# Patient Record
Sex: FEMALE | Race: WHITE | NOT HISPANIC OR LATINO | Employment: UNEMPLOYED | URBAN - METROPOLITAN AREA
[De-identification: names, ages, dates, MRNs, and addresses within clinical notes are randomized per-mention and may not be internally consistent; named-entity substitution may affect disease eponyms.]

---

## 2017-03-10 ENCOUNTER — TRANSCRIBE ORDERS (OUTPATIENT)
Dept: ADMINISTRATIVE | Facility: HOSPITAL | Age: 42
End: 2017-03-10

## 2017-03-10 ENCOUNTER — ALLSCRIPTS OFFICE VISIT (OUTPATIENT)
Dept: OTHER | Facility: OTHER | Age: 42
End: 2017-03-10

## 2017-03-10 DIAGNOSIS — G93.2 BENIGN INTRACRANIAL HYPERTENSION: ICD-10-CM

## 2017-03-10 DIAGNOSIS — R51.9 FACIAL PAIN: Primary | ICD-10-CM

## 2017-03-10 DIAGNOSIS — R51.9 HEADACHE: ICD-10-CM

## 2017-03-11 ENCOUNTER — HOSPITAL ENCOUNTER (EMERGENCY)
Facility: HOSPITAL | Age: 42
Discharge: HOME | End: 2017-03-11
Attending: EMERGENCY MEDICINE | Admitting: EMERGENCY MEDICINE
Payer: COMMERCIAL

## 2017-03-11 ENCOUNTER — APPOINTMENT (EMERGENCY)
Dept: RADIOLOGY | Facility: HOSPITAL | Age: 42
End: 2017-03-11
Payer: COMMERCIAL

## 2017-03-11 ENCOUNTER — APPOINTMENT (EMERGENCY)
Dept: CT IMAGING | Facility: HOSPITAL | Age: 42
End: 2017-03-11
Payer: COMMERCIAL

## 2017-03-11 VITALS
DIASTOLIC BLOOD PRESSURE: 68 MMHG | HEART RATE: 66 BPM | OXYGEN SATURATION: 100 % | TEMPERATURE: 98.4 F | WEIGHT: 175 LBS | SYSTOLIC BLOOD PRESSURE: 107 MMHG | RESPIRATION RATE: 18 BRPM

## 2017-03-11 DIAGNOSIS — T85.618A SHUNT MALFUNCTION, INITIAL ENCOUNTER: ICD-10-CM

## 2017-03-11 DIAGNOSIS — R11.0 NAUSEA: ICD-10-CM

## 2017-03-11 DIAGNOSIS — R51.9 HEADACHE: Primary | ICD-10-CM

## 2017-03-11 LAB
ANION GAP SERPL CALCULATED.3IONS-SCNC: 11 MMOL/L (ref 4–13)
APTT PPP: 22 SECONDS (ref 24–36)
BACTERIA UR QL AUTO: ABNORMAL /HPF
BASOPHILS # BLD AUTO: 0.03 THOUSANDS/ΜL (ref 0–0.1)
BASOPHILS NFR BLD AUTO: 0 % (ref 0–1)
BILIRUB UR QL STRIP: ABNORMAL
BUN SERPL-MCNC: 16 MG/DL (ref 5–25)
CALCIUM SERPL-MCNC: 9.3 MG/DL (ref 8.3–10.1)
CAOX CRY URNS QL MICRO: ABNORMAL /HPF
CHLORIDE SERPL-SCNC: 103 MMOL/L (ref 100–108)
CLARITY UR: CLEAR
CO2 SERPL-SCNC: 27 MMOL/L (ref 21–32)
COLOR UR: YELLOW
CREAT SERPL-MCNC: 0.52 MG/DL (ref 0.6–1.3)
EOSINOPHIL # BLD AUTO: 0.21 THOUSAND/ΜL (ref 0–0.61)
EOSINOPHIL NFR BLD AUTO: 3 % (ref 0–6)
ERYTHROCYTE [DISTWIDTH] IN BLOOD BY AUTOMATED COUNT: 14 % (ref 11.6–15.1)
GFR SERPL CREATININE-BSD FRML MDRD: >60 ML/MIN/1.73SQ M
GLUCOSE SERPL-MCNC: 87 MG/DL (ref 65–140)
GLUCOSE UR STRIP-MCNC: NEGATIVE MG/DL
HCG UR QL: NEGATIVE
HCT VFR BLD AUTO: 43.5 % (ref 34.8–46.1)
HGB BLD-MCNC: 15.4 G/DL (ref 11.5–15.4)
HGB UR QL STRIP.AUTO: ABNORMAL
INR PPP: 1.13 (ref 0.86–1.16)
KETONES UR STRIP-MCNC: ABNORMAL MG/DL
LEUKOCYTE ESTERASE UR QL STRIP: NEGATIVE
LYMPHOCYTES # BLD AUTO: 1.15 THOUSANDS/ΜL (ref 0.6–4.47)
LYMPHOCYTES NFR BLD AUTO: 17 % (ref 14–44)
MCH RBC QN AUTO: 31 PG (ref 26.8–34.3)
MCHC RBC AUTO-ENTMCNC: 35.4 G/DL (ref 31.4–37.4)
MCV RBC AUTO: 88 FL (ref 82–98)
MONOCYTES # BLD AUTO: 0.4 THOUSAND/ΜL (ref 0.17–1.22)
MONOCYTES NFR BLD AUTO: 6 % (ref 4–12)
MUCOUS THREADS UR QL AUTO: ABNORMAL
NEUTROPHILS # BLD AUTO: 4.97 THOUSANDS/ΜL (ref 1.85–7.62)
NEUTS SEG NFR BLD AUTO: 74 % (ref 43–75)
NITRITE UR QL STRIP: NEGATIVE
NON-SQ EPI CELLS URNS QL MICRO: ABNORMAL /HPF
NRBC BLD AUTO-RTO: 0 /100 WBCS
PH UR STRIP.AUTO: 6 [PH] (ref 4.5–8)
PLATELET # BLD AUTO: 206 THOUSANDS/UL (ref 149–390)
PMV BLD AUTO: 11.8 FL (ref 8.9–12.7)
POTASSIUM SERPL-SCNC: 5.4 MMOL/L (ref 3.5–5.3)
PROT UR STRIP-MCNC: ABNORMAL MG/DL
PROTHROMBIN TIME: 14.5 SECONDS (ref 12–14.3)
RBC # BLD AUTO: 4.96 MILLION/UL (ref 3.81–5.12)
RBC #/AREA URNS AUTO: ABNORMAL /HPF
SODIUM SERPL-SCNC: 141 MMOL/L (ref 136–145)
SP GR UR STRIP.AUTO: >=1.03 (ref 1–1.03)
UROBILINOGEN UR QL STRIP.AUTO: 2 E.U./DL
WBC # BLD AUTO: 6.76 THOUSAND/UL (ref 4.31–10.16)
WBC #/AREA URNS AUTO: ABNORMAL /HPF

## 2017-03-11 PROCEDURE — 99284 EMERGENCY DEPT VISIT MOD MDM: CPT

## 2017-03-11 PROCEDURE — 81001 URINALYSIS AUTO W/SCOPE: CPT

## 2017-03-11 PROCEDURE — 85025 COMPLETE CBC W/AUTO DIFF WBC: CPT | Performed by: EMERGENCY MEDICINE

## 2017-03-11 PROCEDURE — 81002 URINALYSIS NONAUTO W/O SCOPE: CPT | Performed by: EMERGENCY MEDICINE

## 2017-03-11 PROCEDURE — 70450 CT HEAD/BRAIN W/O DYE: CPT

## 2017-03-11 PROCEDURE — 87086 URINE CULTURE/COLONY COUNT: CPT

## 2017-03-11 PROCEDURE — 81025 URINE PREGNANCY TEST: CPT | Performed by: EMERGENCY MEDICINE

## 2017-03-11 PROCEDURE — 72100 X-RAY EXAM L-S SPINE 2/3 VWS: CPT

## 2017-03-11 PROCEDURE — 96372 THER/PROPH/DIAG INJ SC/IM: CPT

## 2017-03-11 PROCEDURE — 36415 COLL VENOUS BLD VENIPUNCTURE: CPT | Performed by: EMERGENCY MEDICINE

## 2017-03-11 PROCEDURE — 85610 PROTHROMBIN TIME: CPT | Performed by: EMERGENCY MEDICINE

## 2017-03-11 PROCEDURE — 85730 THROMBOPLASTIN TIME PARTIAL: CPT | Performed by: EMERGENCY MEDICINE

## 2017-03-11 PROCEDURE — 80048 BASIC METABOLIC PNL TOTAL CA: CPT | Performed by: EMERGENCY MEDICINE

## 2017-03-11 RX ORDER — KETOROLAC TROMETHAMINE 30 MG/ML
15 INJECTION, SOLUTION INTRAMUSCULAR; INTRAVENOUS ONCE
Status: DISCONTINUED | OUTPATIENT
Start: 2017-03-11 | End: 2017-03-11

## 2017-03-11 RX ORDER — METOCLOPRAMIDE 10 MG/1
10 TABLET ORAL EVERY 6 HOURS
Qty: 20 TABLET | Refills: 0 | Status: SHIPPED | OUTPATIENT
Start: 2017-03-11 | End: 2017-03-16

## 2017-03-11 RX ORDER — METOCLOPRAMIDE HYDROCHLORIDE 5 MG/ML
10 INJECTION INTRAMUSCULAR; INTRAVENOUS ONCE
Status: DISCONTINUED | OUTPATIENT
Start: 2017-03-11 | End: 2017-03-11

## 2017-03-11 RX ORDER — DIPHENHYDRAMINE HYDROCHLORIDE 50 MG/ML
50 INJECTION INTRAMUSCULAR; INTRAVENOUS ONCE
Status: DISCONTINUED | OUTPATIENT
Start: 2017-03-11 | End: 2017-03-11

## 2017-03-11 RX ORDER — KETOROLAC TROMETHAMINE 30 MG/ML
15 INJECTION, SOLUTION INTRAMUSCULAR; INTRAVENOUS ONCE
Status: COMPLETED | OUTPATIENT
Start: 2017-03-11 | End: 2017-03-11

## 2017-03-11 RX ORDER — MORPHINE SULFATE 15 MG/1
7.5 TABLET ORAL EVERY 6 HOURS PRN
Qty: 5 TABLET | Refills: 0 | Status: SHIPPED | OUTPATIENT
Start: 2017-03-11 | End: 2017-03-13

## 2017-03-11 RX ORDER — OLANZAPINE 5 MG/1
10 TABLET, ORALLY DISINTEGRATING ORAL ONCE
Status: COMPLETED | OUTPATIENT
Start: 2017-03-11 | End: 2017-03-11

## 2017-03-11 RX ORDER — METOCLOPRAMIDE 10 MG/1
10 TABLET ORAL ONCE
Status: COMPLETED | OUTPATIENT
Start: 2017-03-11 | End: 2017-03-11

## 2017-03-11 RX ADMIN — KETOROLAC TROMETHAMINE 15 MG: 30 INJECTION, SOLUTION INTRAMUSCULAR at 17:07

## 2017-03-11 RX ADMIN — OLANZAPINE 10 MG: 5 TABLET, ORALLY DISINTEGRATING ORAL at 15:34

## 2017-03-11 RX ADMIN — METOCLOPRAMIDE 10 MG: 10 TABLET ORAL at 17:07

## 2017-03-13 LAB — BACTERIA UR CULT: NORMAL

## 2017-03-16 ENCOUNTER — GENERIC CONVERSION - ENCOUNTER (OUTPATIENT)
Dept: OTHER | Facility: OTHER | Age: 42
End: 2017-03-16

## 2017-03-22 ENCOUNTER — TRANSCRIBE ORDERS (OUTPATIENT)
Dept: ADMINISTRATIVE | Facility: HOSPITAL | Age: 42
End: 2017-03-22

## 2017-03-22 DIAGNOSIS — G93.2 BENIGN INTRACRANIAL HYPERTENSION: Primary | ICD-10-CM

## 2017-03-27 ENCOUNTER — HOSPITAL ENCOUNTER (OUTPATIENT)
Dept: MRI IMAGING | Facility: HOSPITAL | Age: 42
Discharge: HOME | End: 2017-03-27
Attending: PSYCHIATRY & NEUROLOGY
Payer: COMMERCIAL

## 2017-03-27 ENCOUNTER — GENERIC CONVERSION - ENCOUNTER (OUTPATIENT)
Dept: OTHER | Facility: OTHER | Age: 42
End: 2017-03-27

## 2017-03-27 DIAGNOSIS — R51.9 HEADACHE: ICD-10-CM

## 2017-03-27 DIAGNOSIS — G93.2 BENIGN INTRACRANIAL HYPERTENSION: ICD-10-CM

## 2017-03-27 PROCEDURE — 70544 MR ANGIOGRAPHY HEAD W/O DYE: CPT

## 2017-03-27 PROCEDURE — 70551 MRI BRAIN STEM W/O DYE: CPT

## 2017-03-29 ENCOUNTER — HOSPITAL ENCOUNTER (OUTPATIENT)
Dept: MRI IMAGING | Facility: HOSPITAL | Age: 42
Discharge: HOME | End: 2017-03-29
Attending: PSYCHIATRY & NEUROLOGY
Payer: COMMERCIAL

## 2017-03-29 DIAGNOSIS — R51.9 FACIAL PAIN: ICD-10-CM

## 2017-03-29 DIAGNOSIS — G93.2 BENIGN INTRACRANIAL HYPERTENSION: ICD-10-CM

## 2017-03-29 PROCEDURE — 70547 MR ANGIOGRAPHY NECK W/O DYE: CPT

## 2017-03-30 ENCOUNTER — ALLSCRIPTS OFFICE VISIT (OUTPATIENT)
Dept: OTHER | Facility: OTHER | Age: 42
End: 2017-03-30

## 2017-04-04 ENCOUNTER — GENERIC CONVERSION - ENCOUNTER (OUTPATIENT)
Dept: OTHER | Facility: OTHER | Age: 42
End: 2017-04-04

## 2017-04-05 ENCOUNTER — HOSPITAL ENCOUNTER (OUTPATIENT)
Dept: RADIOLOGY | Facility: HOSPITAL | Age: 42
Discharge: HOME | End: 2017-04-05
Attending: PSYCHIATRY & NEUROLOGY
Payer: COMMERCIAL

## 2017-04-10 ENCOUNTER — GENERIC CONVERSION - ENCOUNTER (OUTPATIENT)
Dept: OTHER | Facility: OTHER | Age: 42
End: 2017-04-10

## 2017-04-21 ENCOUNTER — ALLSCRIPTS OFFICE VISIT (OUTPATIENT)
Dept: OTHER | Facility: OTHER | Age: 42
End: 2017-04-21

## 2017-04-27 ENCOUNTER — ALLSCRIPTS OFFICE VISIT (OUTPATIENT)
Dept: OTHER | Facility: OTHER | Age: 42
End: 2017-04-27

## 2017-04-27 DIAGNOSIS — M75.51 BURSITIS OF RIGHT SHOULDER: ICD-10-CM

## 2017-05-02 ENCOUNTER — GENERIC CONVERSION - ENCOUNTER (OUTPATIENT)
Dept: OTHER | Facility: OTHER | Age: 42
End: 2017-05-02

## 2017-05-08 RX ORDER — MULTIVIT-MIN/IRON/FOLIC ACID/K 18-600-40
CAPSULE ORAL DAILY
COMMUNITY
End: 2018-04-13 | Stop reason: HOSPADM

## 2017-05-08 RX ORDER — FUROSEMIDE 20 MG/1
20 TABLET ORAL DAILY
COMMUNITY
End: 2017-06-02 | Stop reason: HOSPADM

## 2017-05-08 RX ORDER — LANOLIN ALCOHOL/MO/W.PET/CERES
1000 CREAM (GRAM) TOPICAL DAILY
COMMUNITY
End: 2018-04-13 | Stop reason: HOSPADM

## 2017-05-08 RX ORDER — MULTIVITAMIN
1 TABLET ORAL DAILY
COMMUNITY
End: 2018-04-13 | Stop reason: HOSPADM

## 2017-05-08 RX ORDER — BIOTIN 10000 MCG
CAPSULE ORAL DAILY
COMMUNITY
End: 2018-04-13 | Stop reason: HOSPADM

## 2017-05-08 RX ORDER — PANTOPRAZOLE SODIUM 40 MG/1
40 TABLET, DELAYED RELEASE ORAL 2 TIMES DAILY
COMMUNITY
End: 2018-04-13 | Stop reason: HOSPADM

## 2017-05-08 RX ORDER — INDOMETHACIN 50 MG/1
50 CAPSULE ORAL 2 TIMES DAILY PRN
COMMUNITY
End: 2018-04-13 | Stop reason: HOSPADM

## 2017-05-08 RX ORDER — VERAPAMIL HYDROCHLORIDE 100 MG/1
100 CAPSULE, EXTENDED RELEASE ORAL DAILY
COMMUNITY
End: 2017-06-02 | Stop reason: HOSPADM

## 2017-05-17 ENCOUNTER — GENERIC CONVERSION - ENCOUNTER (OUTPATIENT)
Dept: OTHER | Facility: OTHER | Age: 42
End: 2017-05-17

## 2017-05-24 ENCOUNTER — ALLSCRIPTS OFFICE VISIT (OUTPATIENT)
Dept: OTHER | Facility: OTHER | Age: 42
End: 2017-05-24

## 2017-05-24 ENCOUNTER — GENERIC CONVERSION - ENCOUNTER (OUTPATIENT)
Dept: OTHER | Facility: OTHER | Age: 42
End: 2017-05-24

## 2017-05-25 ENCOUNTER — ANESTHESIA EVENT (OUTPATIENT)
Dept: PERIOP | Facility: HOSPITAL | Age: 42
DRG: 022 | End: 2017-05-25
Payer: COMMERCIAL

## 2017-05-30 ENCOUNTER — GENERIC CONVERSION - ENCOUNTER (OUTPATIENT)
Dept: OTHER | Facility: OTHER | Age: 42
End: 2017-05-30

## 2017-05-31 ENCOUNTER — APPOINTMENT (INPATIENT)
Dept: RADIOLOGY | Facility: HOSPITAL | Age: 42
DRG: 022 | End: 2017-05-31
Payer: COMMERCIAL

## 2017-05-31 ENCOUNTER — HOSPITAL ENCOUNTER (INPATIENT)
Facility: HOSPITAL | Age: 42
LOS: 2 days | Discharge: HOME | DRG: 022 | End: 2017-06-02
Attending: NEUROLOGICAL SURGERY | Admitting: NEUROLOGICAL SURGERY
Payer: COMMERCIAL

## 2017-05-31 ENCOUNTER — ANESTHESIA (OUTPATIENT)
Dept: PERIOP | Facility: HOSPITAL | Age: 42
DRG: 022 | End: 2017-05-31
Payer: COMMERCIAL

## 2017-05-31 DIAGNOSIS — G93.2 PSEUDOTUMOR CEREBRI: Primary | ICD-10-CM

## 2017-05-31 PROBLEM — Z98.2 S/P VP SHUNT: Status: ACTIVE | Noted: 2017-05-31

## 2017-05-31 PROBLEM — R51.9 OCCIPITAL HEADACHE: Status: ACTIVE | Noted: 2017-05-31

## 2017-05-31 LAB
ABO GROUP BLD: NORMAL
ALBUMIN SERPL BCP-MCNC: 3.3 G/DL (ref 3.5–5)
ALP SERPL-CCNC: 62 U/L (ref 46–116)
ALT SERPL W P-5'-P-CCNC: 30 U/L (ref 12–78)
ANION GAP SERPL CALCULATED.3IONS-SCNC: 10 MMOL/L (ref 4–13)
APTT PPP: 28 SECONDS (ref 23–35)
AST SERPL W P-5'-P-CCNC: 18 U/L (ref 5–45)
BASOPHILS # BLD MANUAL: 0 THOUSAND/UL (ref 0–0.1)
BASOPHILS NFR MAR MANUAL: 0 % (ref 0–1)
BILIRUB SERPL-MCNC: 0.45 MG/DL (ref 0.2–1)
BLD GP AB SCN SERPL QL: NEGATIVE
BUN SERPL-MCNC: 14 MG/DL (ref 5–25)
BURR CELLS BLD QL SMEAR: PRESENT
CA-I BLD-SCNC: 1.14 MMOL/L (ref 1.12–1.32)
CALCIUM SERPL-MCNC: 8.6 MG/DL (ref 8.3–10.1)
CHLORIDE SERPL-SCNC: 107 MMOL/L (ref 100–108)
CO2 SERPL-SCNC: 23 MMOL/L (ref 21–32)
CREAT SERPL-MCNC: 0.52 MG/DL (ref 0.6–1.3)
EOSINOPHIL # BLD MANUAL: 0 THOUSAND/UL (ref 0–0.4)
EOSINOPHIL NFR BLD MANUAL: 0 % (ref 0–6)
ERYTHROCYTE [DISTWIDTH] IN BLOOD BY AUTOMATED COUNT: 14.3 % (ref 11.6–15.1)
GFR SERPL CREATININE-BSD FRML MDRD: >60 ML/MIN/1.73SQ M
GLUCOSE SERPL-MCNC: 131 MG/DL (ref 65–140)
GLUCOSE SERPL-MCNC: 139 MG/DL (ref 65–140)
HCT VFR BLD AUTO: 40.7 % (ref 34.8–46.1)
HGB BLD-MCNC: 14 G/DL (ref 11.5–15.4)
INR PPP: 1.17 (ref 0.86–1.16)
LYMPHOCYTES # BLD AUTO: 0.3 THOUSAND/UL (ref 0.6–4.47)
LYMPHOCYTES # BLD AUTO: 3 % (ref 14–44)
MAGNESIUM SERPL-MCNC: 2 MG/DL (ref 1.6–2.6)
MCH RBC QN AUTO: 30.4 PG (ref 26.8–34.3)
MCHC RBC AUTO-ENTMCNC: 34.4 G/DL (ref 31.4–37.4)
MCV RBC AUTO: 88 FL (ref 82–98)
MONOCYTES # BLD AUTO: 0.1 THOUSAND/UL (ref 0–1.22)
MONOCYTES NFR BLD: 1 % (ref 4–12)
NEUTROPHILS # BLD MANUAL: 9.68 THOUSAND/UL (ref 1.85–7.62)
NEUTS SEG NFR BLD AUTO: 96 % (ref 43–75)
NRBC BLD AUTO-RTO: 0 /100 WBCS
PHOSPHATE SERPL-MCNC: 3 MG/DL (ref 2.7–4.5)
PLATELET # BLD AUTO: 183 THOUSANDS/UL (ref 149–390)
PLATELET BLD QL SMEAR: ADEQUATE
PMV BLD AUTO: 10.9 FL (ref 8.9–12.7)
POTASSIUM SERPL-SCNC: 4.6 MMOL/L (ref 3.5–5.3)
PROT SERPL-MCNC: 6.2 G/DL (ref 6.4–8.2)
PROTHROMBIN TIME: 15 SECONDS (ref 12.1–14.4)
RBC # BLD AUTO: 4.61 MILLION/UL (ref 3.81–5.12)
RBC MORPH BLD: PRESENT
RH BLD: NEGATIVE
SODIUM SERPL-SCNC: 140 MMOL/L (ref 136–145)
SPECIMEN EXPIRATION DATE: NORMAL
WBC # BLD AUTO: 10.08 THOUSAND/UL (ref 4.31–10.16)

## 2017-05-31 PROCEDURE — 86850 RBC ANTIBODY SCREEN: CPT | Performed by: NEUROLOGICAL SURGERY

## 2017-05-31 PROCEDURE — 30233N1 TRANSFUSION OF NONAUTOLOGOUS RED BLOOD CELLS INTO PERIPHERAL VEIN, PERCUTANEOUS APPROACH: ICD-10-PCS | Performed by: NEUROLOGICAL SURGERY

## 2017-05-31 PROCEDURE — 86900 BLOOD TYPING SEROLOGIC ABO: CPT | Performed by: NEUROLOGICAL SURGERY

## 2017-05-31 PROCEDURE — 80053 COMPREHEN METABOLIC PANEL: CPT | Performed by: INTERNAL MEDICINE

## 2017-05-31 PROCEDURE — 86920 COMPATIBILITY TEST SPIN: CPT

## 2017-05-31 PROCEDURE — 71020 HB CHEST X-RAY 2VW FRONTAL&LATL: CPT

## 2017-05-31 PROCEDURE — 70250 X-RAY EXAM OF SKULL: CPT

## 2017-05-31 PROCEDURE — C1894 INTRO/SHEATH, NON-LASER: HCPCS | Performed by: NEUROLOGICAL SURGERY

## 2017-05-31 PROCEDURE — 82948 REAGENT STRIP/BLOOD GLUCOSE: CPT

## 2017-05-31 PROCEDURE — 82330 ASSAY OF CALCIUM: CPT | Performed by: INTERNAL MEDICINE

## 2017-05-31 PROCEDURE — 74000 HB X-RAY EXAM OF ABDOMEN (SINGLE ANTEROPOSTERIOR VIEW): CPT

## 2017-05-31 PROCEDURE — 85027 COMPLETE CBC AUTOMATED: CPT | Performed by: INTERNAL MEDICINE

## 2017-05-31 PROCEDURE — 00PU3JZ REMOVAL OF SYNTHETIC SUBSTITUTE FROM SPINAL CANAL, PERCUTANEOUS APPROACH: ICD-10-PCS | Performed by: NEUROLOGICAL SURGERY

## 2017-05-31 PROCEDURE — 83735 ASSAY OF MAGNESIUM: CPT | Performed by: INTERNAL MEDICINE

## 2017-05-31 PROCEDURE — 85007 BL SMEAR W/DIFF WBC COUNT: CPT | Performed by: INTERNAL MEDICINE

## 2017-05-31 PROCEDURE — 86901 BLOOD TYPING SEROLOGIC RH(D): CPT | Performed by: NEUROLOGICAL SURGERY

## 2017-05-31 PROCEDURE — 85610 PROTHROMBIN TIME: CPT | Performed by: INTERNAL MEDICINE

## 2017-05-31 PROCEDURE — 84100 ASSAY OF PHOSPHORUS: CPT | Performed by: INTERNAL MEDICINE

## 2017-05-31 PROCEDURE — 85730 THROMBOPLASTIN TIME PARTIAL: CPT | Performed by: INTERNAL MEDICINE

## 2017-05-31 PROCEDURE — 00163J6 BYPASS CEREBRAL VENTRICLE TO PERITONEAL CAVITY WITH SYNTHETIC SUBSTITUTE, PERCUTANEOUS APPROACH: ICD-10-PCS | Performed by: NEUROLOGICAL SURGERY

## 2017-05-31 PROCEDURE — C1729 CATH, DRAINAGE: HCPCS | Performed by: NEUROLOGICAL SURGERY

## 2017-05-31 DEVICE — IMPLANTABLE DEVICE
Type: IMPLANTABLE DEVICE | Site: BRAIN | Status: NON-FUNCTIONAL
Removed: 2018-02-05

## 2017-05-31 DEVICE — BACTISEAL VP CATHETER KIT
Type: IMPLANTABLE DEVICE | Site: PERITONEUM | Status: NON-FUNCTIONAL
Removed: 2018-02-05

## 2017-05-31 RX ORDER — BIOTIN 10000 MCG
1 CAPSULE ORAL DAILY
Status: DISCONTINUED | OUTPATIENT
Start: 2017-05-31 | End: 2017-05-31 | Stop reason: RX

## 2017-05-31 RX ORDER — DOCUSATE SODIUM 100 MG/1
100 CAPSULE, LIQUID FILLED ORAL 2 TIMES DAILY
Status: DISCONTINUED | OUTPATIENT
Start: 2017-05-31 | End: 2017-06-02 | Stop reason: HOSPADM

## 2017-05-31 RX ORDER — SODIUM CHLORIDE 9 MG/ML
75 INJECTION, SOLUTION INTRAVENOUS CONTINUOUS
Status: DISCONTINUED | OUTPATIENT
Start: 2017-05-31 | End: 2017-05-31

## 2017-05-31 RX ORDER — PROPOFOL 10 MG/ML
INJECTION, EMULSION INTRAVENOUS AS NEEDED
Status: DISCONTINUED | OUTPATIENT
Start: 2017-05-31 | End: 2017-05-31 | Stop reason: SURG

## 2017-05-31 RX ORDER — METOCLOPRAMIDE HYDROCHLORIDE 5 MG/ML
10 INJECTION INTRAMUSCULAR; INTRAVENOUS ONCE
Status: COMPLETED | OUTPATIENT
Start: 2017-05-31 | End: 2017-05-31

## 2017-05-31 RX ORDER — LIDOCAINE HYDROCHLORIDE AND EPINEPHRINE 10; 10 MG/ML; UG/ML
INJECTION, SOLUTION INFILTRATION; PERINEURAL AS NEEDED
Status: DISCONTINUED | OUTPATIENT
Start: 2017-05-31 | End: 2017-05-31 | Stop reason: HOSPADM

## 2017-05-31 RX ORDER — NALOXONE HYDROCHLORIDE 0.4 MG/ML
INJECTION, SOLUTION INTRAMUSCULAR; INTRAVENOUS; SUBCUTANEOUS AS NEEDED
Status: DISCONTINUED | OUTPATIENT
Start: 2017-05-31 | End: 2017-05-31 | Stop reason: SURG

## 2017-05-31 RX ORDER — LIDOCAINE HYDROCHLORIDE 10 MG/ML
INJECTION, SOLUTION INFILTRATION; PERINEURAL AS NEEDED
Status: DISCONTINUED | OUTPATIENT
Start: 2017-05-31 | End: 2017-05-31 | Stop reason: HOSPADM

## 2017-05-31 RX ORDER — OXYCODONE HYDROCHLORIDE 10 MG/1
10 TABLET ORAL EVERY 4 HOURS PRN
Status: DISCONTINUED | OUTPATIENT
Start: 2017-05-31 | End: 2017-06-01

## 2017-05-31 RX ORDER — ONDANSETRON 2 MG/ML
4 INJECTION INTRAMUSCULAR; INTRAVENOUS ONCE
Status: DISCONTINUED | OUTPATIENT
Start: 2017-05-31 | End: 2017-05-31 | Stop reason: HOSPADM

## 2017-05-31 RX ORDER — ACETAMINOPHEN 325 MG/1
650 TABLET ORAL EVERY 4 HOURS PRN
Status: DISCONTINUED | OUTPATIENT
Start: 2017-05-31 | End: 2017-06-02 | Stop reason: HOSPADM

## 2017-05-31 RX ORDER — PANTOPRAZOLE SODIUM 40 MG/1
40 TABLET, DELAYED RELEASE ORAL
Status: DISCONTINUED | OUTPATIENT
Start: 2017-05-31 | End: 2017-06-02 | Stop reason: HOSPADM

## 2017-05-31 RX ORDER — CHLORHEXIDINE GLUCONATE 0.12 MG/ML
15 RINSE ORAL ONCE
Status: COMPLETED | OUTPATIENT
Start: 2017-05-31 | End: 2017-05-31

## 2017-05-31 RX ORDER — BISACODYL 10 MG
10 SUPPOSITORY, RECTAL RECTAL DAILY PRN
Status: DISCONTINUED | OUTPATIENT
Start: 2017-05-31 | End: 2017-06-02 | Stop reason: HOSPADM

## 2017-05-31 RX ORDER — SODIUM CHLORIDE 9 MG/ML
INJECTION, SOLUTION INTRAVENOUS CONTINUOUS PRN
Status: DISCONTINUED | OUTPATIENT
Start: 2017-05-31 | End: 2017-05-31 | Stop reason: SURG

## 2017-05-31 RX ORDER — ROCURONIUM BROMIDE 10 MG/ML
INJECTION, SOLUTION INTRAVENOUS AS NEEDED
Status: DISCONTINUED | OUTPATIENT
Start: 2017-05-31 | End: 2017-05-31 | Stop reason: SURG

## 2017-05-31 RX ORDER — HEPARIN SODIUM 5000 [USP'U]/ML
5000 INJECTION, SOLUTION INTRAVENOUS; SUBCUTANEOUS EVERY 8 HOURS SCHEDULED
Status: DISCONTINUED | OUTPATIENT
Start: 2017-06-01 | End: 2017-06-02 | Stop reason: HOSPADM

## 2017-05-31 RX ORDER — ONDANSETRON 2 MG/ML
4 INJECTION INTRAMUSCULAR; INTRAVENOUS EVERY 4 HOURS PRN
Status: DISCONTINUED | OUTPATIENT
Start: 2017-05-31 | End: 2017-06-02

## 2017-05-31 RX ORDER — GLYCOPYRROLATE 0.2 MG/ML
INJECTION INTRAMUSCULAR; INTRAVENOUS AS NEEDED
Status: DISCONTINUED | OUTPATIENT
Start: 2017-05-31 | End: 2017-05-31 | Stop reason: SURG

## 2017-05-31 RX ORDER — SUCCINYLCHOLINE CHLORIDE 20 MG/ML
INJECTION INTRAMUSCULAR; INTRAVENOUS AS NEEDED
Status: DISCONTINUED | OUTPATIENT
Start: 2017-05-31 | End: 2017-05-31 | Stop reason: SURG

## 2017-05-31 RX ORDER — KETAMINE HYDROCHLORIDE 50 MG/ML
INJECTION, SOLUTION, CONCENTRATE INTRAMUSCULAR; INTRAVENOUS AS NEEDED
Status: DISCONTINUED | OUTPATIENT
Start: 2017-05-31 | End: 2017-05-31 | Stop reason: SURG

## 2017-05-31 RX ORDER — MIDAZOLAM HYDROCHLORIDE 1 MG/ML
INJECTION INTRAMUSCULAR; INTRAVENOUS AS NEEDED
Status: DISCONTINUED | OUTPATIENT
Start: 2017-05-31 | End: 2017-05-31 | Stop reason: SURG

## 2017-05-31 RX ORDER — ONDANSETRON 2 MG/ML
INJECTION INTRAMUSCULAR; INTRAVENOUS AS NEEDED
Status: DISCONTINUED | OUTPATIENT
Start: 2017-05-31 | End: 2017-05-31 | Stop reason: SURG

## 2017-05-31 RX ORDER — SODIUM CHLORIDE, SODIUM LACTATE, POTASSIUM CHLORIDE, CALCIUM CHLORIDE 600; 310; 30; 20 MG/100ML; MG/100ML; MG/100ML; MG/100ML
INJECTION, SOLUTION INTRAVENOUS CONTINUOUS PRN
Status: DISCONTINUED | OUTPATIENT
Start: 2017-05-31 | End: 2017-05-31 | Stop reason: SURG

## 2017-05-31 RX ORDER — FENTANYL CITRATE 50 UG/ML
25 INJECTION, SOLUTION INTRAMUSCULAR; INTRAVENOUS
Status: DISCONTINUED | OUTPATIENT
Start: 2017-05-31 | End: 2017-06-01

## 2017-05-31 RX ORDER — PROPOFOL 10 MG/ML
INJECTION, EMULSION INTRAVENOUS CONTINUOUS PRN
Status: DISCONTINUED | OUTPATIENT
Start: 2017-05-31 | End: 2017-05-31 | Stop reason: SURG

## 2017-05-31 RX ORDER — CHOLECALCIFEROL (VITAMIN D3) 125 MCG
1000 CAPSULE ORAL DAILY
Status: DISCONTINUED | OUTPATIENT
Start: 2017-05-31 | End: 2017-06-02 | Stop reason: HOSPADM

## 2017-05-31 RX ADMIN — SODIUM CHLORIDE, SODIUM LACTATE, POTASSIUM CHLORIDE, AND CALCIUM CHLORIDE: .6; .31; .03; .02 INJECTION, SOLUTION INTRAVENOUS at 10:00

## 2017-05-31 RX ADMIN — CEFAZOLIN SODIUM 2000 MG: 2 SOLUTION INTRAVENOUS at 08:50

## 2017-05-31 RX ADMIN — MIDAZOLAM HYDROCHLORIDE 2 MG: 1 INJECTION, SOLUTION INTRAMUSCULAR; INTRAVENOUS at 08:27

## 2017-05-31 RX ADMIN — HYDROMORPHONE HYDROCHLORIDE 0.25 MG: 1 INJECTION, SOLUTION INTRAMUSCULAR; INTRAVENOUS; SUBCUTANEOUS at 11:10

## 2017-05-31 RX ADMIN — DEXAMETHASONE SODIUM PHOSPHATE 10 MG: 10 INJECTION INTRAMUSCULAR; INTRAVENOUS at 09:11

## 2017-05-31 RX ADMIN — HYDROMORPHONE HYDROCHLORIDE 0.4 MG: 1 INJECTION, SOLUTION INTRAMUSCULAR; INTRAVENOUS; SUBCUTANEOUS at 12:26

## 2017-05-31 RX ADMIN — LEVETIRACETAM 500 MG: 100 INJECTION, SOLUTION, CONCENTRATE INTRAVENOUS at 21:31

## 2017-05-31 RX ADMIN — PROPOFOL 200 MG: 10 INJECTION, EMULSION INTRAVENOUS at 08:31

## 2017-05-31 RX ADMIN — GLYCOPYRROLATE 0.2 MG: 0.2 INJECTION INTRAMUSCULAR; INTRAVENOUS at 10:00

## 2017-05-31 RX ADMIN — CYANOCOBALAMIN TAB 500 MCG 1000 MCG: 500 TAB at 16:03

## 2017-05-31 RX ADMIN — HYDROMORPHONE HYDROCHLORIDE 1 MG: 1 INJECTION, SOLUTION INTRAMUSCULAR; INTRAVENOUS; SUBCUTANEOUS at 15:53

## 2017-05-31 RX ADMIN — ONDANSETRON 4 MG: 2 INJECTION INTRAMUSCULAR; INTRAVENOUS at 17:12

## 2017-05-31 RX ADMIN — HYDROMORPHONE HYDROCHLORIDE 1 MG: 1 INJECTION, SOLUTION INTRAMUSCULAR; INTRAVENOUS; SUBCUTANEOUS at 08:40

## 2017-05-31 RX ADMIN — GLYCOPYRROLATE 0.2 MG: 0.2 INJECTION INTRAMUSCULAR; INTRAVENOUS at 11:50

## 2017-05-31 RX ADMIN — OXYCODONE HYDROCHLORIDE 10 MG: 10 TABLET ORAL at 17:56

## 2017-05-31 RX ADMIN — SODIUM CHLORIDE 75 ML/HR: 0.9 INJECTION, SOLUTION INTRAVENOUS at 13:29

## 2017-05-31 RX ADMIN — LEVETIRACETAM 1000 MG: 100 INJECTION, SOLUTION INTRAVENOUS at 09:40

## 2017-05-31 RX ADMIN — ROCURONIUM BROMIDE 25 MG: 10 INJECTION, SOLUTION INTRAVENOUS at 08:50

## 2017-05-31 RX ADMIN — HYDROMORPHONE HYDROCHLORIDE 0.5 MG: 1 INJECTION, SOLUTION INTRAMUSCULAR; INTRAVENOUS; SUBCUTANEOUS at 10:55

## 2017-05-31 RX ADMIN — HYDROMORPHONE HYDROCHLORIDE 0.4 MG: 1 INJECTION, SOLUTION INTRAMUSCULAR; INTRAVENOUS; SUBCUTANEOUS at 12:40

## 2017-05-31 RX ADMIN — KETAMINE HYDROCHLORIDE 35 MG: 50 INJECTION, SOLUTION INTRAMUSCULAR; INTRAVENOUS at 09:11

## 2017-05-31 RX ADMIN — VERAPAMIL HYDROCHLORIDE 120 MG: 120 TABLET, FILM COATED, EXTENDED RELEASE ORAL at 16:03

## 2017-05-31 RX ADMIN — DOCUSATE SODIUM 100 MG: 100 CAPSULE, LIQUID FILLED ORAL at 17:56

## 2017-05-31 RX ADMIN — PROPOFOL 125 MCG/KG/MIN: 10 INJECTION, EMULSION INTRAVENOUS at 09:43

## 2017-05-31 RX ADMIN — PROPOFOL 50 MG: 10 INJECTION, EMULSION INTRAVENOUS at 10:55

## 2017-05-31 RX ADMIN — ONDANSETRON 4 MG: 2 INJECTION INTRAMUSCULAR; INTRAVENOUS at 21:31

## 2017-05-31 RX ADMIN — PANTOPRAZOLE SODIUM 40 MG: 40 TABLET, DELAYED RELEASE ORAL at 15:53

## 2017-05-31 RX ADMIN — CHLORHEXIDINE GLUCONATE 15 ML: 1.2 RINSE ORAL at 06:41

## 2017-05-31 RX ADMIN — NALOXONE HYDROCHLORIDE 0.04 MG: 0.4 INJECTION, SOLUTION INTRAMUSCULAR; INTRAVENOUS; SUBCUTANEOUS at 12:03

## 2017-05-31 RX ADMIN — HYDROMORPHONE HYDROCHLORIDE 0.4 MG: 1 INJECTION, SOLUTION INTRAMUSCULAR; INTRAVENOUS; SUBCUTANEOUS at 12:19

## 2017-05-31 RX ADMIN — SUCCINYLCHOLINE CHLORIDE 100 MG: 20 INJECTION, SOLUTION INTRAMUSCULAR; INTRAVENOUS at 08:31

## 2017-05-31 RX ADMIN — LIDOCAINE HYDROCHLORIDE 5 ML: 20 INJECTION, SOLUTION INTRAVENOUS at 08:32

## 2017-05-31 RX ADMIN — CEFAZOLIN SODIUM 1000 MG: 1 SOLUTION INTRAVENOUS at 15:53

## 2017-05-31 RX ADMIN — HYDROMORPHONE HYDROCHLORIDE 0.25 MG: 1 INJECTION, SOLUTION INTRAMUSCULAR; INTRAVENOUS; SUBCUTANEOUS at 10:40

## 2017-05-31 RX ADMIN — NALOXONE HYDROCHLORIDE 0.04 MG: 0.4 INJECTION, SOLUTION INTRAMUSCULAR; INTRAVENOUS; SUBCUTANEOUS at 12:04

## 2017-05-31 RX ADMIN — ONDANSETRON 4 MG: 2 INJECTION INTRAMUSCULAR; INTRAVENOUS at 11:34

## 2017-05-31 RX ADMIN — METOCLOPRAMIDE 10 MG: 5 INJECTION, SOLUTION INTRAMUSCULAR; INTRAVENOUS at 22:00

## 2017-05-31 RX ADMIN — SODIUM CHLORIDE, SODIUM LACTATE, POTASSIUM CHLORIDE, AND CALCIUM CHLORIDE: .6; .31; .03; .02 INJECTION, SOLUTION INTRAVENOUS at 08:06

## 2017-05-31 RX ADMIN — HYDROMORPHONE HYDROCHLORIDE 1 MG: 1 INJECTION, SOLUTION INTRAMUSCULAR; INTRAVENOUS; SUBCUTANEOUS at 19:44

## 2017-05-31 RX ADMIN — NEOSTIGMINE METHYLSULFATE 2 MG: 1 INJECTION, SOLUTION INTRAMUSCULAR; INTRAVENOUS; SUBCUTANEOUS at 11:50

## 2017-05-31 RX ADMIN — SODIUM CHLORIDE: 0.9 INJECTION, SOLUTION INTRAVENOUS at 08:40

## 2017-06-01 ENCOUNTER — APPOINTMENT (INPATIENT)
Dept: RADIOLOGY | Facility: HOSPITAL | Age: 42
DRG: 022 | End: 2017-06-01
Payer: COMMERCIAL

## 2017-06-01 LAB
ANION GAP SERPL CALCULATED.3IONS-SCNC: 7 MMOL/L (ref 4–13)
APTT PPP: 26 SECONDS (ref 23–35)
BUN SERPL-MCNC: 11 MG/DL (ref 5–25)
CALCIUM SERPL-MCNC: 8.7 MG/DL (ref 8.3–10.1)
CHLORIDE SERPL-SCNC: 106 MMOL/L (ref 100–108)
CO2 SERPL-SCNC: 26 MMOL/L (ref 21–32)
CREAT SERPL-MCNC: 0.44 MG/DL (ref 0.6–1.3)
ERYTHROCYTE [DISTWIDTH] IN BLOOD BY AUTOMATED COUNT: 15 % (ref 11.6–15.1)
GFR SERPL CREATININE-BSD FRML MDRD: >60 ML/MIN/1.73SQ M
GLUCOSE SERPL-MCNC: 80 MG/DL (ref 65–140)
HCT VFR BLD AUTO: 38.2 % (ref 34.8–46.1)
HGB BLD-MCNC: 13.2 G/DL (ref 11.5–15.4)
INR PPP: 1.17 (ref 0.86–1.16)
MCH RBC QN AUTO: 30.8 PG (ref 26.8–34.3)
MCHC RBC AUTO-ENTMCNC: 34.6 G/DL (ref 31.4–37.4)
MCV RBC AUTO: 89 FL (ref 82–98)
PLATELET # BLD AUTO: 178 THOUSANDS/UL (ref 149–390)
PMV BLD AUTO: 12.2 FL (ref 8.9–12.7)
POTASSIUM SERPL-SCNC: 3.9 MMOL/L (ref 3.5–5.3)
PROTHROMBIN TIME: 15 SECONDS (ref 12.1–14.4)
RBC # BLD AUTO: 4.28 MILLION/UL (ref 3.81–5.12)
SODIUM SERPL-SCNC: 139 MMOL/L (ref 136–145)
WBC # BLD AUTO: 9.7 THOUSAND/UL (ref 4.31–10.16)

## 2017-06-01 PROCEDURE — G8988 SELF CARE GOAL STATUS: HCPCS

## 2017-06-01 PROCEDURE — G8989 SELF CARE D/C STATUS: HCPCS

## 2017-06-01 PROCEDURE — 85730 THROMBOPLASTIN TIME PARTIAL: CPT | Performed by: NEUROLOGICAL SURGERY

## 2017-06-01 PROCEDURE — 97165 OT EVAL LOW COMPLEX 30 MIN: CPT

## 2017-06-01 PROCEDURE — 80048 BASIC METABOLIC PNL TOTAL CA: CPT | Performed by: NEUROLOGICAL SURGERY

## 2017-06-01 PROCEDURE — 85610 PROTHROMBIN TIME: CPT | Performed by: NEUROLOGICAL SURGERY

## 2017-06-01 PROCEDURE — G8987 SELF CARE CURRENT STATUS: HCPCS

## 2017-06-01 PROCEDURE — 70450 CT HEAD/BRAIN W/O DYE: CPT

## 2017-06-01 PROCEDURE — G8978 MOBILITY CURRENT STATUS: HCPCS

## 2017-06-01 PROCEDURE — 97163 PT EVAL HIGH COMPLEX 45 MIN: CPT

## 2017-06-01 PROCEDURE — G8979 MOBILITY GOAL STATUS: HCPCS

## 2017-06-01 PROCEDURE — 85027 COMPLETE CBC AUTOMATED: CPT | Performed by: NEUROLOGICAL SURGERY

## 2017-06-01 RX ORDER — OXYCODONE HYDROCHLORIDE 10 MG/1
10 TABLET ORAL EVERY 4 HOURS PRN
Status: DISCONTINUED | OUTPATIENT
Start: 2017-06-01 | End: 2017-06-02 | Stop reason: HOSPADM

## 2017-06-01 RX ORDER — CALCIUM CARBONATE 200(500)MG
500 TABLET,CHEWABLE ORAL 3 TIMES DAILY PRN
Status: DISCONTINUED | OUTPATIENT
Start: 2017-06-01 | End: 2017-06-02 | Stop reason: HOSPADM

## 2017-06-01 RX ORDER — METOCLOPRAMIDE HYDROCHLORIDE 5 MG/ML
10 INJECTION INTRAMUSCULAR; INTRAVENOUS EVERY 6 HOURS PRN
Status: COMPLETED | OUTPATIENT
Start: 2017-06-01 | End: 2017-06-02

## 2017-06-01 RX ORDER — ONDANSETRON 2 MG/ML
4 INJECTION INTRAMUSCULAR; INTRAVENOUS ONCE
Status: COMPLETED | OUTPATIENT
Start: 2017-06-01 | End: 2017-06-01

## 2017-06-01 RX ORDER — OXYCODONE HYDROCHLORIDE 5 MG/1
5 TABLET ORAL EVERY 4 HOURS PRN
Status: DISCONTINUED | OUTPATIENT
Start: 2017-06-01 | End: 2017-06-02 | Stop reason: HOSPADM

## 2017-06-01 RX ADMIN — DOCUSATE SODIUM 100 MG: 100 CAPSULE, LIQUID FILLED ORAL at 16:54

## 2017-06-01 RX ADMIN — PANTOPRAZOLE SODIUM 40 MG: 40 TABLET, DELAYED RELEASE ORAL at 05:13

## 2017-06-01 RX ADMIN — PANTOPRAZOLE SODIUM 40 MG: 40 TABLET, DELAYED RELEASE ORAL at 16:54

## 2017-06-01 RX ADMIN — METOCLOPRAMIDE 10 MG: 5 INJECTION, SOLUTION INTRAMUSCULAR; INTRAVENOUS at 20:26

## 2017-06-01 RX ADMIN — ONDANSETRON 4 MG: 2 INJECTION INTRAMUSCULAR; INTRAVENOUS at 09:42

## 2017-06-01 RX ADMIN — ONDANSETRON 4 MG: 2 INJECTION INTRAMUSCULAR; INTRAVENOUS at 12:53

## 2017-06-01 RX ADMIN — Medication 500 MG: at 13:56

## 2017-06-01 RX ADMIN — HEPARIN SODIUM 5000 UNITS: 5000 INJECTION, SOLUTION INTRAVENOUS; SUBCUTANEOUS at 21:29

## 2017-06-01 RX ADMIN — ONDANSETRON 4 MG: 2 INJECTION INTRAMUSCULAR; INTRAVENOUS at 18:28

## 2017-06-01 RX ADMIN — HEPARIN SODIUM 5000 UNITS: 5000 INJECTION, SOLUTION INTRAVENOUS; SUBCUTANEOUS at 13:31

## 2017-06-01 RX ADMIN — HYDROMORPHONE HYDROCHLORIDE 1 MG: 1 INJECTION, SOLUTION INTRAMUSCULAR; INTRAVENOUS; SUBCUTANEOUS at 05:14

## 2017-06-01 RX ADMIN — HYDROMORPHONE HYDROCHLORIDE 1 MG: 1 INJECTION, SOLUTION INTRAMUSCULAR; INTRAVENOUS; SUBCUTANEOUS at 09:42

## 2017-06-01 RX ADMIN — ONDANSETRON 4 MG: 2 INJECTION INTRAMUSCULAR; INTRAVENOUS at 09:15

## 2017-06-01 RX ADMIN — LEVETIRACETAM 500 MG: 100 INJECTION, SOLUTION, CONCENTRATE INTRAVENOUS at 09:38

## 2017-06-01 RX ADMIN — OXYCODONE HYDROCHLORIDE 10 MG: 10 TABLET ORAL at 16:53

## 2017-06-01 RX ADMIN — CEFAZOLIN SODIUM 1000 MG: 1 SOLUTION INTRAVENOUS at 00:25

## 2017-06-01 RX ADMIN — HYDROMORPHONE HYDROCHLORIDE 1 MG: 1 INJECTION, SOLUTION INTRAMUSCULAR; INTRAVENOUS; SUBCUTANEOUS at 21:30

## 2017-06-01 RX ADMIN — LEVETIRACETAM 500 MG: 100 INJECTION, SOLUTION, CONCENTRATE INTRAVENOUS at 21:06

## 2017-06-01 RX ADMIN — ONDANSETRON 4 MG: 2 INJECTION INTRAMUSCULAR; INTRAVENOUS at 04:35

## 2017-06-01 RX ADMIN — Medication 500 MG: at 18:36

## 2017-06-01 RX ADMIN — OXYCODONE HYDROCHLORIDE 10 MG: 10 TABLET ORAL at 02:07

## 2017-06-01 RX ADMIN — Medication 500 MG: at 09:15

## 2017-06-01 RX ADMIN — HEPARIN SODIUM 5000 UNITS: 5000 INJECTION, SOLUTION INTRAVENOUS; SUBCUTANEOUS at 05:13

## 2017-06-02 VITALS
DIASTOLIC BLOOD PRESSURE: 58 MMHG | OXYGEN SATURATION: 98 % | SYSTOLIC BLOOD PRESSURE: 110 MMHG | BODY MASS INDEX: 26.46 KG/M2 | HEART RATE: 64 BPM | TEMPERATURE: 98.4 F | HEIGHT: 64 IN | RESPIRATION RATE: 18 BRPM | WEIGHT: 155 LBS

## 2017-06-02 RX ORDER — HYDROMORPHONE HYDROCHLORIDE 4 MG/1
TABLET ORAL
Qty: 20 TABLET | Refills: 0 | Status: SHIPPED | OUTPATIENT
Start: 2017-06-02 | End: 2018-04-13 | Stop reason: HOSPADM

## 2017-06-02 RX ORDER — ACETAMINOPHEN 325 MG/1
650 TABLET ORAL EVERY 4 HOURS PRN
Qty: 30 TABLET | Refills: 0
Start: 2017-06-02 | End: 2018-04-13 | Stop reason: HOSPADM

## 2017-06-02 RX ORDER — ONDANSETRON 2 MG/ML
8 INJECTION INTRAMUSCULAR; INTRAVENOUS EVERY 4 HOURS PRN
Status: DISCONTINUED | OUTPATIENT
Start: 2017-06-02 | End: 2017-06-02 | Stop reason: CLARIF

## 2017-06-02 RX ORDER — LEVETIRACETAM 250 MG/1
500 TABLET ORAL 2 TIMES DAILY
Qty: 28 TABLET | Refills: 0 | Status: SHIPPED | OUTPATIENT
Start: 2017-06-02 | End: 2018-04-13 | Stop reason: HOSPADM

## 2017-06-02 RX ORDER — DOCUSATE SODIUM 100 MG/1
100 CAPSULE, LIQUID FILLED ORAL 2 TIMES DAILY
Qty: 10 CAPSULE | Refills: 0
Start: 2017-06-02 | End: 2018-04-13 | Stop reason: HOSPADM

## 2017-06-02 RX ORDER — ONDANSETRON 4 MG/1
4 TABLET, ORALLY DISINTEGRATING ORAL EVERY 6 HOURS PRN
Qty: 15 TABLET | Refills: 0 | Status: SHIPPED | OUTPATIENT
Start: 2017-06-02 | End: 2018-04-13 | Stop reason: HOSPADM

## 2017-06-02 RX ADMIN — HYDROMORPHONE HYDROCHLORIDE 1 MG: 1 INJECTION, SOLUTION INTRAMUSCULAR; INTRAVENOUS; SUBCUTANEOUS at 03:58

## 2017-06-02 RX ADMIN — LEVETIRACETAM 500 MG: 100 INJECTION, SOLUTION, CONCENTRATE INTRAVENOUS at 08:39

## 2017-06-02 RX ADMIN — PANTOPRAZOLE SODIUM 40 MG: 40 TABLET, DELAYED RELEASE ORAL at 06:21

## 2017-06-02 RX ADMIN — METOCLOPRAMIDE 10 MG: 5 INJECTION, SOLUTION INTRAMUSCULAR; INTRAVENOUS at 08:40

## 2017-06-02 RX ADMIN — HEPARIN SODIUM 5000 UNITS: 5000 INJECTION, SOLUTION INTRAVENOUS; SUBCUTANEOUS at 06:20

## 2017-06-03 LAB
ABO GROUP BLD BPU: NORMAL
ABO GROUP BLD BPU: NORMAL
BPU ID: NORMAL
BPU ID: NORMAL
CROSSMATCH: NORMAL
CROSSMATCH: NORMAL
UNIT DISPENSE STATUS: NORMAL
UNIT DISPENSE STATUS: NORMAL
UNIT PRODUCT CODE: NORMAL
UNIT PRODUCT CODE: NORMAL
UNIT RH: NORMAL
UNIT RH: NORMAL

## 2017-06-05 ENCOUNTER — GENERIC CONVERSION - ENCOUNTER (OUTPATIENT)
Dept: OTHER | Facility: OTHER | Age: 42
End: 2017-06-05

## 2017-06-14 ENCOUNTER — ALLSCRIPTS OFFICE VISIT (OUTPATIENT)
Dept: OTHER | Facility: OTHER | Age: 42
End: 2017-06-14

## 2017-06-14 ENCOUNTER — TRANSCRIBE ORDERS (OUTPATIENT)
Dept: ADMINISTRATIVE | Facility: HOSPITAL | Age: 42
End: 2017-06-14

## 2017-06-14 DIAGNOSIS — M75.81 OTHER SHOULDER LESIONS, RIGHT SHOULDER: ICD-10-CM

## 2017-06-14 DIAGNOSIS — G93.2 BENIGN INTRACRANIAL HYPERTENSION: Primary | ICD-10-CM

## 2017-06-14 DIAGNOSIS — G93.2 BENIGN INTRACRANIAL HYPERTENSION: ICD-10-CM

## 2017-06-14 DIAGNOSIS — Z98.2 PRESENCE OF CEREBROSPINAL FLUID DRAINAGE DEVICE: ICD-10-CM

## 2017-06-14 DIAGNOSIS — Z00.00 ENCOUNTER FOR GENERAL ADULT MEDICAL EXAMINATION WITHOUT ABNORMAL FINDINGS: ICD-10-CM

## 2017-06-14 DIAGNOSIS — R51 HEADACHE(784.0): ICD-10-CM

## 2017-06-22 ENCOUNTER — GENERIC CONVERSION - ENCOUNTER (OUTPATIENT)
Dept: OTHER | Facility: OTHER | Age: 42
End: 2017-06-22

## 2017-07-05 ENCOUNTER — HOSPITAL ENCOUNTER (OUTPATIENT)
Dept: CT IMAGING | Facility: HOSPITAL | Age: 42
Discharge: HOME | End: 2017-07-05
Payer: COMMERCIAL

## 2017-07-05 DIAGNOSIS — G93.2 BENIGN INTRACRANIAL HYPERTENSION: ICD-10-CM

## 2017-07-05 PROCEDURE — 70450 CT HEAD/BRAIN W/O DYE: CPT

## 2017-07-06 ENCOUNTER — ALLSCRIPTS OFFICE VISIT (OUTPATIENT)
Dept: OTHER | Facility: OTHER | Age: 42
End: 2017-07-06

## 2017-07-17 ENCOUNTER — GENERIC CONVERSION - ENCOUNTER (OUTPATIENT)
Dept: OTHER | Facility: OTHER | Age: 42
End: 2017-07-17

## 2017-08-09 ENCOUNTER — GENERIC CONVERSION - ENCOUNTER (OUTPATIENT)
Dept: OTHER | Facility: OTHER | Age: 42
End: 2017-08-09

## 2017-08-11 ENCOUNTER — ALLSCRIPTS OFFICE VISIT (OUTPATIENT)
Dept: OTHER | Facility: OTHER | Age: 42
End: 2017-08-11

## 2017-08-11 ENCOUNTER — GENERIC CONVERSION - ENCOUNTER (OUTPATIENT)
Dept: OTHER | Facility: OTHER | Age: 42
End: 2017-08-11

## 2017-08-11 DIAGNOSIS — Z12.31 ENCOUNTER FOR SCREENING MAMMOGRAM FOR MALIGNANT NEOPLASM OF BREAST: ICD-10-CM

## 2017-08-11 DIAGNOSIS — E78.00 PURE HYPERCHOLESTEROLEMIA: ICD-10-CM

## 2017-08-25 ENCOUNTER — GENERIC CONVERSION - ENCOUNTER (OUTPATIENT)
Dept: OTHER | Facility: OTHER | Age: 42
End: 2017-08-25

## 2017-09-20 ENCOUNTER — ALLSCRIPTS OFFICE VISIT (OUTPATIENT)
Dept: OTHER | Facility: OTHER | Age: 42
End: 2017-09-20

## 2017-10-05 ENCOUNTER — ALLSCRIPTS OFFICE VISIT (OUTPATIENT)
Dept: OTHER | Facility: OTHER | Age: 42
End: 2017-10-05

## 2017-10-05 ENCOUNTER — TRANSCRIBE ORDERS (OUTPATIENT)
Dept: ADMINISTRATIVE | Facility: HOSPITAL | Age: 42
End: 2017-10-05

## 2017-10-05 DIAGNOSIS — R51.9 HEADACHE: ICD-10-CM

## 2017-10-05 DIAGNOSIS — R51.9 FACIAL PAIN: Primary | ICD-10-CM

## 2017-10-05 DIAGNOSIS — G93.2 BENIGN INTRACRANIAL HYPERTENSION: ICD-10-CM

## 2017-10-05 DIAGNOSIS — Z98.2 PRESENCE OF CEREBROSPINAL FLUID DRAINAGE DEVICE: ICD-10-CM

## 2017-10-05 DIAGNOSIS — R41.89 OTHER SYMPTOMS AND SIGNS INVOLVING COGNITIVE FUNCTIONS AND AWARENESS: ICD-10-CM

## 2017-10-05 DIAGNOSIS — F41.8 OTHER SPECIFIED ANXIETY DISORDERS: ICD-10-CM

## 2017-10-06 NOTE — PROGRESS NOTES
Assessment  1  S/P  shunt (V45 2) (Z98 2)   2  Occipital headache (784 0) (R51)    Plan   · * CT HEAD WO CONTRAST; Status:Need Information - Financial Authorization; Requested OCD:[id number];    Perform:[facility]; ECS:[id number]; Ordered; For:Cognitive changes, Depression with anxiety, Occipital headache, S/P  shunt; Ordered By:Edouard Anton;   · XR SHUNT SERIES; Status:Active; Requested TWM:[id number];    Perform:[facility]; Order Comments:include xray of the shunt valve for pressure determination; Due:05Oct2018; Ordered; For:Cognitive changes, Depression with anxiety, Occipital headache, S/P  shunt; Ordered By:Edouard Anton;    Discussion/Summary    42-year-old female who had pseudotumor cerebri treated with a LP shunt initially  This was placed by another surgeon and was removed by myself  A ventricular peritoneal shunt was placed to increase reliability  The patient reports that her headaches seem to be getting worse on occasion  We discussed changing the timing for taking headache medications medications  I have ordered a CT scan and a shunt series to work this up although I think it is unlikely to be related to her shunt  We will plan on seeing her back in the office following the completion of the studies  Chief Complaint  Patient presents for 3 month routine clinic follow up to recheck symptoms s/p placement of  shunt and removal of LP shunt on 5/31/17 by Dr Rosa Isela Mario  History of Present Illness  Patient is a 35y old female who reports that his headaches in August 2015  Operative report from 11/19/15 (Dr Mavis Lei at [facility]) indicated patient underwent placement of lumboperitoneal shunt system (HV valve made by [employer]  This was a horizontal 50-80mm of water and vertical was 230-320mm of water) for idiopathic intracranial hypertension   According to Dr Florencio Jeans operative report patient's opening pressure was 430mm of water at his facility sometime prior to the LP surgery  She reportedly was not tolerating Diamox well and she had history of kidney stones  She reportedly had presented with severe intractable headaches, difficulty with her balance, and bilateral papilledema prior to her LP surgery  Patient underwent LP shunt placement and reported her headaches had improved  The valve is bothering her and she feels the system has always worked intermittantly  Her lumbar puncture pressure was elevated and she had to undergo placement of  shunt and removal of LP shunt on 5/31/17   head 7/5/17 was very carefully reviewed in detail and compared with prior study 6/1/17, showed position is appropriate, no evidence of intra-or extra cerebral swelling or edema is appreciated, ventricles are appropriate in size  Clinically the patient was doing very well without focal neurologic deficit  We reviewed her complaints/symptoms and felt that these are all related and/or associated with the healing processes and that over time he should resolve  He did advised her she may proceed with her hair coloring today  He has planned follow-up in approximately 3 months, additional imaging will be based on her symptomatology at that time  patient reports that on Saturday 9/30/17 she was having bad headache and on Sunday she was in bed all day because of the pressure in her head  She feels her headaches are becoming more intense  Besides that, she states she was just prescribed glasses due to blur vision  Review of Systems    Constitutional: No fever, no chills, feels well, no tiredness, no recent weight gain or weight loss  Eyes: No complaints of eye pain, no red eyes, no eyesight problems, no discharge, no dry eyes, no itching of eyes  ENT: no complaints of earache, no loss of hearing, no nose bleeds, no nasal discharge, no sore throat, no hoarseness     Cardiovascular: No complaints of slow heart rate, no fast heart rate, no chest pain, no palpitations, no leg claudication, no lower extremity edema  Respiratory: No complaints of shortness of breath, no wheezing, no cough, no SOB on exertion, no orthopnea, no PND  Gastrointestinal: No complaints of abdominal pain, no constipation, no nausea or vomiting, no diarrhea, no bloody stools  Genitourinary: No complaints of dysuria, no incontinence, no pelvic pain, no dysmenorrhea, no vaginal discharge or bleeding  Musculoskeletal: No complaints of arthralgias, no myalgias, no joint swelling or stiffness, no limb pain or swelling  Integumentary: No complaints of skin rash or lesions, no itching, no skin wounds, no breast pain or lump  Neurological: headache, but-as noted in HPI,-no numbness,-no tingling,-no dizziness-and-no limb weakness  Psychiatric: Not suicidal, no sleep disturbance, no anxiety or depression, no change in personality, no emotional problems  Endocrine: No complaints of proptosis, no hot flashes, no muscle weakness, no deepening of the voice, no feelings of weakness  Hematologic/Lymphatic: No complaints of swollen glands, no swollen glands in the neck, does not bleed easily, does not bruise easily  Active Problems  1  Cognitive changes (799 59) (R41 89)   2  Depression with anxiety (300 4) (F41 8)   3  Gastric bypass status for obesity (V45 86) (Z98 84)   4  GERD (gastroesophageal reflux disease) (530 81) (K21 9)   5  H/O: hysterectomy (V88 01) (Z90 710)   6  Hypercholesteremia (272 0) (E78 00)   7  Occipital headache (784 0) (R51)   8  Pseudotumor cerebri (348 2) (G93 2)   9  S/P  shunt (V45 2) (Z98 2)   10  Subacromial bursitis, right (726 19) (M75 51)   11  Visit for screening mammogram (V76 12) (Z12 31)    Past Medical History  1  History of obesity (V13 89) (Z86 39)   2  History of visual field defect (V12 49) (Z86 69)   3  History of Other disorders of optic nerve, not elsewhere classified, 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)   (H47 092)   4   History of Papilledema, both eyes (377 00) (H47 10) 5  History of Presence of lumboperitoneal shunt (V45 2) (Z98 2)   6  History of Right rotator cuff tendinitis (726 10) (M75 81)    Surgical History  1  History of Eye Surgery   2  History of Gastric Surgery For Morbid Obesity Gastric Bypass   3  History of Spinal CSF Shunt    Family History  Mother    1  Family history of No significant active problems  Father    2  Family history of No significant active problems  Brother    3  Family history of malignant neoplasm of thyroid (V16 8) (Z80 8)  Paternal Grandmother    4  Family history of    5  Family history of malignant neoplasm (V16 9) (Z80 9)  Maternal Grandfather    6  Family history of Colon cancer   7  Family history of   Paternal Grandfather    6  Family history of    5  Family history of malignant neoplasm (V16 9) (Z80 9)    Social History   · Employed   · Former smoker (V15 82) (C09 391)   · Has 2 children   · High school or GED   · No alcohol use   · No illicit drug use   · Non drinker / no alcohol use   · Sexually active   · Single    Current Meds   1  Biotin 1 MG Oral Capsule; 4 gummies daily; Therapy: 71WRZ0307 to Recorded   2  Butalbital-APAP-Caffeine -40 MG Oral Capsule; TAKE 1 CAPSULE  PRN; Last   Rx:2017 Ordered   3  Calcium 500-100 MG-UNIT Oral Tablet Chewable; 6 TABLETS DAILY; Therapy: 43XXK6778 to Recorded   4  Fiber Select Gummies Oral Tablet Chewable; 2 gummies daily; Therapy: 28SAO2445 to Recorded   5  Multivitamins TABS; TAKE 1 TABLET DAILY; Therapy: (Recorded:2017) to Recorded   6  Nortriptyline HCl - 10 MG Oral Capsule; 1 capsule at bedtime for 1 week then 2 capsules   at bedtime; Therapy: 35EKF8253 to (Evaluate:2018)  Requested for: 40HZO0266; Last   Rx:2017 Ordered   7  Protonix 40 MG Oral Tablet Delayed Release; TAKE 1 TABLET DAILY; Therapy: (Recorded:2017) to Recorded   8  Vitamin B-12 1000 MCG Oral Tablet; 1 tablet daily; Therapy: 02NVJ2836 to Recorded   9   Vitamin D3 2000 UNIT Oral Capsule; 2 CAPSULES DAILY; Therapy: 95OED8475 to Recorded    Allergies  1  Benadryl   2  Phenergan    Vitals  Vital Signs    Recorded: 96CKT0414 11:07AM   Temperature 96 5 F   Heart Rate 48   Respiration 16   Systolic 602   Diastolic 73   Height 5 ft 3 in   Weight 135 lb    BMI Calculated 23 91   BSA Calculated 1 64   Pain Scale 5     Physical Exam     Mental Status: Alert and Oriented x3  -Memory is intact  -Attentive  -Speech is articulate and fluent  -Knowledge and vocabulary consistent with education  -Grossly nonfocal     Cranial Nerve Exam:  2nd cranial nerve: Normal with no noted deficit -3rd, 4th, and 6th cranial nerves: PERRLA and EOMI without later gaze nystagmus  -7th cranial nerve: Face symmetrical at grimace and at rest  -8th cranial nerve: Grossly intact to finger rub bilaterally  -11th cranial nerve: Shoulder shrug equal bilaterally  Motor System - Upper Extremities: Muscle strength: 5/5 bilaterally  -Muscle tone: Normal bilaterally  -Muscle Bulk: Normal Muscle bulk bilaterally  Motor System - Lower Extremities: Muscle strength: 5/5 bilaterally  -Muscle tone: Normal bilaterally  -Muscle Bulk: Normal Muscle bulk bilaterally  Coordination: Coordination: Finger to nose was normal, heel to knee to shin was normal able to perform rapid alternating movements well bilaterally  -Involuntary movements: None   Gait and Station: Gait and station: Beti with a normal gait and station  Tandem walk is normal, Heel walk and toe walk intact and without sign of paresis  -She is able to heel walk and toe walk and perform tandem gait  Constitutional General appearance: No acute distress, well appearing and well nourished  Health Management  Depression screening   [facility] Adult Depression Screening; every 1 year; Last [id number]; Next Due: 11Aug2018;   Active    Future Appointments    Date/Time Provider Specialty Site   01/31/2018 09:00 AM Braden Copeland, Destiney Stanley Neurology [facility]  [place]   02/19/2018 10:00 AM Jasvir Álvarez DO Internal Medicine [facility]     Signatures   Electronically signed by : FRANCOIS Reilly ; Oct  5 2017 11:37AM EST                       (Author)

## 2017-10-10 ENCOUNTER — HOSPITAL ENCOUNTER (OUTPATIENT)
Dept: RADIOLOGY | Facility: HOSPITAL | Age: 42
Discharge: HOME | End: 2017-10-10
Attending: NEUROLOGICAL SURGERY
Payer: COMMERCIAL

## 2017-10-10 ENCOUNTER — HOSPITAL ENCOUNTER (OUTPATIENT)
Dept: CT IMAGING | Facility: HOSPITAL | Age: 42
Discharge: HOME | End: 2017-10-10
Attending: NEUROLOGICAL SURGERY
Payer: COMMERCIAL

## 2017-10-10 DIAGNOSIS — F41.8 OTHER SPECIFIED ANXIETY DISORDERS: ICD-10-CM

## 2017-10-10 DIAGNOSIS — Z98.2 PRESENCE OF CEREBROSPINAL FLUID DRAINAGE DEVICE: ICD-10-CM

## 2017-10-10 DIAGNOSIS — R51.9 HEADACHE: ICD-10-CM

## 2017-10-10 DIAGNOSIS — R41.89 OTHER SYMPTOMS AND SIGNS INVOLVING COGNITIVE FUNCTIONS AND AWARENESS: ICD-10-CM

## 2017-10-10 PROCEDURE — 70250 X-RAY EXAM OF SKULL: CPT

## 2017-10-10 PROCEDURE — 70450 CT HEAD/BRAIN W/O DYE: CPT

## 2017-10-10 PROCEDURE — 71020 HB CHEST X-RAY 2VW FRONTAL&LATL: CPT

## 2017-10-10 PROCEDURE — 74000 HB X-RAY EXAM OF ABDOMEN (SINGLE ANTEROPOSTERIOR VIEW): CPT

## 2017-10-18 ENCOUNTER — GENERIC CONVERSION - ENCOUNTER (OUTPATIENT)
Dept: OTHER | Facility: OTHER | Age: 42
End: 2017-10-18

## 2017-10-26 NOTE — PROGRESS NOTES
Assessment  1  Pseudotumor cerebri (348 2) (G93 2)   2  S/P  shunt (V45 2) (Z98 2)   3  Cognitive changes (799 59) (R41 89)   4  Depression with anxiety (300 4) (F41 8)    Plan   Cognitive changes    · Follow-up visit in 4 Months Evaluation and Treatment  Follow-up  Status: Complete   Done: 46SCB9834   Ordered; For: Cognitive changes; Ordered By: Bev Dunaway Performed:  Due: 04QNK2023; Last Updated By: Rojean Severe; 9/20/2017 11:20:19 AM  Pseudotumor cerebri    · Nortriptyline HCl - 10 MG Oral Capsule; 1 capsule at bedtime for 1 week then 2  capsules at bedtime   Rx By: Jamaal Alcantara; Dispense: 30 Days ; #:60 Capsule; Refill: 6;For: Pseudotumor cerebri; CHARLES = N; Verified Transmission to [employer] [facility] #[id number]; Last Updated By: SystemiSuppli; 9/20/2017 11:13:53 AM   · From  Fioricet -40 MG Oral Capsule TAKE 1 CAPSULE  PRN To  Butalbital-APAP-Caffeine -40 MG Oral Capsule (Fioricet) TAKE 1 CAPSULE  PRN   Rx By: Bev Dunaway; Dispense: 30 Days ; #:10 Capsule; Refill: 0;For: Pseudotumor cerebri; CHARLES = N; Call Rx; Last Updated By: Jamaal Alcantara; 9/20/2017 11:13:14 AM    Kamila Marquez PSYD  Neuropsychology Co-Management  *  Status: Need Information - Financial Authorization  Requested for: [id number]  Ordered; For: Cognitive changes;  Ordered By: Jamaal Alcantara  Performed:   Due: 16NXC4437; Last Updated By: Rojean Severe; 9/20/2017 1:20:55 PM     Discussion/Summary  Discussion Summary:   Pt reports that she has had some improvement since the  shunt was placed  However when she does get a headache she is unable to function which is causing an increase in depression  She still has the pressure in the back of her head daily  She has frontal pressure headaches 3x per week  She reports that these headaches can become so severe that she is unable to get out of bed and function  She reports that Nortriptyline helped in the past and she is willing to restart the medication   Fioricet does help with these headaches  She only takes it for the most severe of these types of headaches  She understands that she should not take this medication more that 3x per week  She reports worsening memory and is agreeable to Neuropsych testing  She will follow-up in 4 months  Headache [facility]:   The patient was counseled regarding;   Discussed side effects of all medications prescribed today to the patient in detail  Patient education was completed today and we also discussed precautions for rebound headaches  --    When patient has a moderate to severe headache, they should seek rest, initiate relaxation and apply cold compresses to the head  Also recommended to the patient :  1  Maintain regular sleep schedule -- 2  Limit over the counter medications  (No more than 3 times a week)  -- 3  Maintain headache diary  -- 4  Limit caffeine to 1-2 cups a day or less  -- 5  Avoid dietary trigger  (list given to the patient and reviewed with them)  -- 6  Patient is to have regular frequent meals to prevent headache onset  Chief Complaint  Chief Complaint Free Text Note Form: Patient present for present for follow up regarding pseudotumor cerebri      History of Present Illness  HPI: We had the pleasure of evaluating Rehana Castro in neurological follow-up today  As you know she is a 42year old right-handed female here for evaluation of headaches  She is s/p lumboperitoneal shunt placement for increased intracranial pressure, performed at [facility] in 2015 and most recently removal of the shunt and placement of a  shunt in May of 2017  She is also status post gastric bypass surgery and has lost 60lbs  She saw her ophthalmologist in July and there was no optic nerve swelling  her last appt Protriptyline was started  She reports that she stopped it but cannot remember why  She states that since the  shunt was placed she has had improvement in headaches   She has 2 types of headaches one being a frontal pressure sensation and another being a shock type headache  Pt reports that when she does have a headache she is unable to function  She is becoming very depressed because she is unable to participate in activities  She reports that her memory has become worse as well  She requires increased time to think of things and needs to make lists to get things done  family history of aneurysms - Father, unknown type of aneurysmhistory of migraine headaches - no  is your current pain level - 5/10started at what age - November 2015or injury prior to onset of headaches - nooften do the headaches occur ? frontal headache/pressure 3x per week, shock type headaches occur intermittently  time of the day do the headaches start ? can wake up with themlong do the headaches last ? shock type 2-3 hours, frontal constantyou ever headache free ? improved with  shunt placementare they located ? Frontalis the intensity of pain - At its worst, 10/10, pressure sensationwarning prior to headache and how long do they last - N/A, they're constantyour usual headache - Pressure, like my brain is going to explode out of my skullsymptoms: Problem with concentrationBlurred vision occasionallyDizziness when extending or flexing neck to look up or downprefer to be alone and in a dark room, unable to workare worse if the patient: cough, sneeze, bending over, moving bowel, running or exercising, trigger: N/A, they're constantyou current pregnant or planning on getting pregnant? notime of the year do headaches occur more frequently- N/A, they're constantyou seen someone else for headaches or pain - [facility], who referred her hereyou had trigger point injection performed and how often - noyou had Botox injection performed and how often - noyou had epidural injections or transforaminal injections performed - nomedications do you take or have you taken for your headaches?  Nortriptyline (this helped her headaches), Gabapentin (stopped on her own), Diamox (stopped by provider because of her history of kidney stones), Verapamil (fatigue), ProtriptylineFioricet (stopped on her own), Tramadol (stopped on her own), Toradol (stopped on her own), IndomethacinTreatments used in the past for headaches: Has tried Massage (doesn?t help headaches), Chiropractic adjustment (doesn?t help with headaches), Acupuncture (doesn?t help with headaches)  of the head and neck without contrast done on 3/29/17 are both unremarkable  MRI without contrast on 3/27/17 shows ventricles in the lower limits of normal in size consistent with pseudotumor cerebri, as well as scattered parenchymal WM changes which are nonspecific  CT 7/17: No acute intracranial abnormality   @ [facility] April/ 2017: OP 20, CP14  PMH, PSH, SH, FH and ROS  Review of Systems  Neurological ROS:   Constitutional: no fever, no chills, no recent weight gain, no recent weight loss, no complaints of feeling tired, no changes in appetite  HEENT: blurred vision-- and-- tinnitus  Cardiovascular:  no chest pain or pressure, no palpitations present, the heart rate was not rapid or irregular, no swelling in the arms or legs, no poor circulation  Respiratory:  no unusual or persistant cough, no shortness of breath with or without exertion  Gastrointestinal: abdominal pain  Genitourinary:  no incontinence, no feelings of urinary urgency, no increase in frequency, no urinary hesitancy, no dysuria, no hematuria  Musculoskeletal: head/neck/back pain  Integumentary  no masses, no rash, no skin lesions, no livedo reticularis  Psychiatric:  no anxiety, no depression, no mood swings, no psychiatric hospitalizations, no sleep problems  Endocrine  no unusual weight loss or gain, no excessive urination, no excessive thirst, no hair loss or gain, no hot or cold intolerance, no menstrual period change or irregularity, no loss of sexual ability or drive, no erection difficulty, no nipple discharge     Hematologic/Lymphatic:  no unusual bleeding, no tendency for easy bruising, no clotting skin or lumps  Neurological General: headache-- and-- trouble falling asleep  Neurological Mental Status: memory problems  Neurological Cranial Nerves: blurry or double vision-- and-- loss of vision  Neurological Motor findings include:  no tremor, no twitching, no cramping(pre/post exercise), no atrophy  Neurological Coordination: balance difficulties-- and-- clumsiness  Neurological Sensory:  no numbness, no pain, no tingling, does not fall when eyes closed or taking a shower  Neurological Gait:  no difficulty walking, not falling to one side, no sensation of being pushed, has not had falls  ROS Reviewed:   ROS reviewed  Active Problems  1  Gastric bypass status for obesity (V45 86) (Z98 84)   2  GERD (gastroesophageal reflux disease) (530 81) (K21 9)   3  H/O: hysterectomy (V88 01) (Z90 710)   4  Hypercholesteremia (272 0) (E78 00)   5  Occipital headache (784 0) (R51)   6  Pseudotumor cerebri (348 2) (G93 2)   7  Subacromial bursitis, right (726 19) (M75 51)   8  Visit for screening mammogram (V76 12) (Z12 31)    Past Medical History  1  History of obesity (V13 89) (Z86 39)   2  History of visual field defect (V12 49) (Z86 69)   3  History of Other disorders of optic nerve, not elsewhere classified, 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)   (H47 092)   4  History of Papilledema, both eyes (377 00) (H47 10)   5  History of Presence of lumboperitoneal shunt (V45 2) (Z98 2)   6  History of Right rotator cuff tendinitis (726 10) (M75 81)    Surgical History  1  History of Eye Surgery   2  History of Gastric Surgery For Morbid Obesity Gastric Bypass   3  History of Spinal CSF Shunt    Family History  Mother    1  Family history of No significant active problems  Father    2  Family history of No significant active problems  Brother    3  Family history of malignant neoplasm of thyroid (V16 8) (Z80 8)  Paternal Grandmother    4  Family history of    5   Family history of malignant neoplasm (V16 9) (Z80 9)  Maternal Grandfather    6  Family history of Colon cancer   7  Family history of   Paternal Grandfather    6  Family history of    5  Family history of malignant neoplasm (V16 9) (Z80 9)    Social History   · Employed   · Former smoker (V1 82) (M56 450)   · Has 2 children   · High school or GED   · No alcohol use   · No illicit drug use   · Non drinker / no alcohol use   · Sexually active   · Single    Current Meds   1  Biotin 1 MG Oral Capsule; 4 gummies daily; Therapy: 59GEY5664 to Recorded   2  Calcium 500-100 MG-UNIT Oral Tablet Chewable; 6 TABLETS DAILY; Therapy: 76TLN8186 to Recorded   3  Fiber Select Gummies Oral Tablet Chewable; 2 gummies daily; Therapy: 57GNI4350 to Recorded   4  Multivitamins TABS; TAKE 1 TABLET DAILY; Therapy: (Recorded:2017) to Recorded   5  Protonix 40 MG Oral Tablet Delayed Release; TAKE 1 TABLET DAILY; Therapy: (Recorded:2017) to Recorded   6  Vitamin B-12 1000 MCG Oral Tablet; 1 tablet daily; Therapy: 14JCB1158 to Recorded   7  Vitamin D3 2000 UNIT Oral Capsule; 2 CAPSULES DAILY; Therapy: 35CTJ5259 to Recorded    Allergies  1  Benadryl   2  Phenergan    Physical Exam    Constitutional   General appearance: No acute distress, well appearing and well nourished  Eyes Blurred margins early  Musculoskeletal   Gait and station: Normal gait, stance and balance  Muscle strength: Normal strength throughout  Muscle tone: No atrophy, abnormal movements, flaccidity, cogwheeling or spasticity  Involuntary movements: None observed  Neurologic   Orientation to person, place, and time: Normal     Language: Names objects, able to repeat phrases and speaks spontaneously  Language:  The evaluation was normal    2nd cranial nerve: Normal     3rd, 4th, and 6th cranial nerves: Normal     5th cranial nerve: Normal     7th cranial nerve: Normal     8th cranial nerve: Normal     9th cranial nerve: Normal     11th cranial nerve: Normal     12th cranial nerve: Normal     Sensation: Abnormal  -- (Proprioception is slightly diminished in the upper extremities bilaterally) Sensory exam: intact to light touch-- and-- intact pain and temperature sensation  Reflexes: Abnormal   Deep tendon reflexes: 3+ right patella-- and-- 3+ right ankle jerk2+ right biceps,-- 2+ left biceps,-- 2+ right triceps,-- 2+ left triceps,-- 2+ right brachioradialis,-- 2+ left brachioradialis,-- 2+ left patella-- and-- 2+ left ankle jerk  Coordination: Abnormal  -- (Borderline Romberg) Coordination: past-pointing present  -- anxious  Attending Note  Collaborating Physician Note: Collaborating Note: I agree with the Advanced Practitioner note  Future Appointments    Date/Time Provider Specialty Site   01/31/2018 09:00 AM Eve Hernandez [facility] Neurology [facility]   02/19/2018 10:00 AM Laura Matos DO Internal Medicine [facility]   10/05/2017 10:45 AM FRANCOIS Lemus   Neurosurgery [facility]     Signatures   Electronically signed by : Renato Rae [facility]; Sep 20 2017  3:34PM EST                       (Author)    Electronically signed by : Shashi Maldonado MD; Sep 21 2017  8:06AM EST                       (Co-participant)

## 2017-11-17 ENCOUNTER — GENERIC CONVERSION - ENCOUNTER (OUTPATIENT)
Dept: OTHER | Facility: OTHER | Age: 42
End: 2017-11-17

## 2017-11-17 ENCOUNTER — GENERIC CONVERSION - ENCOUNTER (OUTPATIENT)
Dept: INTERNAL MEDICINE CLINIC | Facility: CLINIC | Age: 42
End: 2017-11-17

## 2017-12-13 ENCOUNTER — ALLSCRIPTS OFFICE VISIT (OUTPATIENT)
Dept: OTHER | Facility: OTHER | Age: 42
End: 2017-12-13

## 2018-01-10 ENCOUNTER — ALLSCRIPTS OFFICE VISIT (OUTPATIENT)
Dept: OTHER | Facility: OTHER | Age: 43
End: 2018-01-10

## 2018-01-12 VITALS
WEIGHT: 167.07 LBS | HEART RATE: 65 BPM | HEIGHT: 63 IN | SYSTOLIC BLOOD PRESSURE: 127 MMHG | RESPIRATION RATE: 16 BRPM | DIASTOLIC BLOOD PRESSURE: 81 MMHG | BODY MASS INDEX: 29.6 KG/M2

## 2018-01-12 VITALS — WEIGHT: 180 LBS

## 2018-01-12 NOTE — MISCELLANEOUS
Message  Pt reports waking this AM with terrible head pressure that she cannot stand  She reports taking a nap and waking with the same pressure  She denies any other neurolical s/s  Discussed with Arun Nicole and suggested she present to the ED  She may decide to go to [facility] since it is the closest and she has limited available people to drive her        Signatures   Electronically signed by : Irma Campos, ; Aug  9 2017  4:00PM EST                       (Author)

## 2018-01-12 NOTE — PROGRESS NOTES
Assessment    1  Encounter for postoperative care (V58 49) (Z48 89)    Plan  Encounter for postoperative care    · Follow-up visit in 3 months Evaluation and Treatment  Follow-up, postoperative, Dr Taran Granda  Status: Complete  Done: [id number]   Ordered; For: Encounter for postoperative care; Ordered By: Yue Vital Performed:  Due: [id number]; Last Updated By: Gianfranco Beyer; 7/6/2017 9:51:16 AM    Discussion/Summary    Very pleasant 77-year-old female, accompanied by her son deniz to review CT head 7/5/17, the study was very carefully reviewed in detail by Dr Taran Granda and compared with prior study 6/1/17, showed position is appropriate, no evidence of intra-or extra cerebral swelling or edema is appreciated, ventricles are appropriate in size  Study was reviewed with the patient by Dr Taran Granda  Clinically the patient is doing very well without focal neurologic deficit  Dr Taran Granda review her complaints/symptoms and felt that these are all related and/or associated with the healing processes and that over time he should resolve  He did advised her she may proceed with her hair coloring today  He has planned follow-up in approximately 3 months, additional imaging will be based on her symptomatology at that time  These findings, impressions and recommendations are reviewed in great detail with the patient, he expressed understanding and agreement, his questions were answered completely and to his satisfaction  Follow up has been scheduled  The patient was counseled regarding diagnostic results, instructions for management, patient and family education, importance of compliance with treatment  The patient has the current Goals: Gradual return to all usual activities without restriction  Patient is able to Self-Care        Chief Complaint  Postoperative follow-up, 5 weeks, concern about right-sided pressure sensation, and abdominal discomfort      Post-Op  Post-Op Crani-Brain:   AIDEE PARK is status post shunt procedure  performed on 05/31/2017  IMAGE GUIDED CORONAL PLACEMENT OF PROGRAMABLE VENTRICULAR-PERITONEAL SHUNT, REMOVAL OF LP SHUNT (Right)  History of Present Illness: The patient reports headache and it feels tight on the right side of the face  Patient is also having abdominal pain  , but no dizziness, no speech disturbances, no neck stiffness, no fever, no vertigo, no facial weakness, no cognitive difficulties, no wound drainage, no ataxia, no upper extremity weakness, no lower extremity weakness, no photophobia and no cognition difficulties    The patient presents with complaints of visual complaints, described as blurry vision and double vision  Physical Examination:   Vitals: within normal limits  Surgical incision site (Healing very nicely, without erythema, edema or drainage)  Evaluation of the surgical site demonstrates no warmth, no erythema, no swelling, no induration, no ecchymosis and no tenderness  Cranial Nerves: normal cranial nerve function unless otherwise noted  The patient is noted to be awake, alert and oriented  The patient's speech is normal    Coordination demonstrates normal function, including finger to nose, heal to shin, and rapid alternating movements   normal upper extremity and lower extremity motor strength bilaterally  Sensory exam: sensory function intact unless otherwise noted  no apparent limitations in mobility  Test Results:   Post operative CT scan demonstrated normal post-operative changes  Assessment:   Imaging studies demonstrate no significant changes  Post-op, the patient is doing well, with good pain control and without signs of an infection  Plan:   Patient instructed to follow up in 12 weeks  Review of Systems    Constitutional: No fever, no chills, feels well, no tiredness, no recent weight gain or weight loss       The patient presents with complaints of visual disturbances, described as blurry vision and double vision  ENT: right ear poops  Cardiovascular: No complaints of slow heart rate, no fast heart rate, no chest pain, no palpitations, no leg claudication, no lower extremity edema  Respiratory: No complaints of shortness of breath, no wheezing, no cough, no SOB on exertion, no orthopnea, no PND  Gastrointestinal: No complaints of abdominal pain, no constipation, no nausea or vomiting, no diarrhea, no bloody stools  Genitourinary: No complaints of dysuria, no incontinence, no pelvic pain, no dysmenorrhea, no vaginal discharge or bleeding  Musculoskeletal: No complaints of arthralgias, no myalgias, no joint swelling or stiffness, no limb pain or swelling  Integumentary: No complaints of skin rash or lesions, no itching, no skin wounds, no breast pain or lump  Neurological: headache and confusion  Psychiatric: Not suicidal, no sleep disturbance, no anxiety or depression, no change in personality, no emotional problems  Endocrine: No complaints of proptosis, no hot flashes, no muscle weakness, no deepening of the voice, no feelings of weakness  Hematologic/Lymphatic: No complaints of swollen glands, no swollen glands in the neck, does not bleed easily, does not bruise easily  ROS reviewed  Active Problems    1  Encounter for postoperative care (V58 49) (Z48 89)   2  Occipital headache (784 0) (R51)   3  Presence of lumboperitoneal shunt (V45 2) (Z98 2)   4  Pseudotumor cerebri (348 2) (G93 2)   5  Right rotator cuff tendinitis (726 10) (M75 81)   6  Subacromial bursitis, right (726 19) (M75 51)    Social History    · Employed   · Former smoker (G84 56) (L80 310)   · Has 2 children   · High school or GED   · Non drinker / no alcohol use   · Sexually active   · Single    Current Meds   1  Biotin 1 MG Oral Capsule; 4 gummies daily; Therapy: [id number] to Recorded   2  Calcium 500-100 MG-UNIT Oral Tablet Chewable; 6 TABLETS DAILY; Therapy: 75WPZ5744 to Recorded   3   Fiber Select Gummies Oral Tablet Chewable; 2 gummies daily; Therapy: [id number] to Recorded   4  Vitamin B-12 1000 MCG Oral Tablet; 1 tablet daily; Therapy: 01VAE6856 to Recorded   5  Vitamin D3 2000 UNIT Oral Capsule; 2 CAPSULES DAILY; Therapy: 42ABM4184 to Recorded    Allergies    1  Benadryl   2  Phenergan    Vitals   Recorded: 57WRJ5972 09:08AM   Temperature 97 F   Heart Rate 56   Respiration 16   Systolic 095   Diastolic 65   Height 5 ft 3 in   Weight 149 lb    BMI Calculated 26 39   BSA Calculated 1 71     Physical Exam     Respiratory Respiratory effort: Normal   Auscultation of lungs: Clear to auscultation bilaterally  Cardiovascular Auscultation of heart: Rate is regular  Rhythm is regular  No heart murmur appreciated  Results/Data  * CT HEAD WO CONTRAST [id number] 04:21PM Robert Baldwin     Test Name Result Flag Reference   CT HEAD WO CONTRAST (Report)     CT BRAIN - WITHOUT CONTRAST     INDICATION: Headache  COMPARISON: 6/1/2017  TECHNIQUE: CT examination of the brain was performed  In addition to axial images, coronal reformatted images were created and submitted for interpretation  Radiation dose length product (DLP) for this visit: 1179 mGy-cm   This examination, like all CT scans performed in the [facility], was performed utilizing techniques to minimize radiation dose exposure, including the use of iterative    reconstruction and automated exposure control  IMAGE QUALITY: Diagnostic  FINDINGS:      PARENCHYMA:    No acute intraparenchymal hemorrhage or extra-axial fluid collections  No acute infarctions  There has been interval surgery with placement of a right frontal ventriculoperitoneal shunt catheter  Tip projects towards the midline in the region of the 3rd ventricle  No mass effect or midline shift  VENTRICLES AND EXTRA-AXIAL SPACES:    No evidence of hydrocephalus       Small amount of fluid and air along the right frontal convexity, consistent with recent surgery  VISUALIZED ORBITS AND PARANASAL SINUSES: Unremarkable  CALVARIUM AND EXTRACRANIAL SOFT TISSUES: Right frontal damon hole defect  Ventriculostomy catheter present  [place] along the right temporal parietal convexity  Postoperative changes are present in the soft tissues overlying the right-sided    calvarium, extending into the right neck  IMPRESSION:     No acute intracranial abnormality  Workstation performed: [id number]     Signed by: Esau Gallegos MD   7/5/17     Future Appointments    Date/Time Provider Specialty Site   09/20/2017 10:30 AM Ricardo Diaz [facility] Neurology [facility]   08/11/2017 10:30 AM Cherie Marquez DO Internal Medicine [facility]   10/05/2017 10:45 AM FRANCOIS Adame   Neurosurgery [facility]     Signatures   Electronically signed by : Susannah Pierre, [facility]; Jul 6 2017 12:14PM EST                       (Author)    Electronically signed by : FRANCOIS Rodriges ; Jul 13 2017  1:37PM EST                       (Author)

## 2018-01-13 NOTE — MISCELLANEOUS
Message   Recorded as Task   Date: 10/13/2017 01:57 PM, Created By: Sandrita Abarca   Task Name: Follow Up   Assigned To: Costenbader,Rondel   Regarding Patient: Samara Izaguirre, Status: Active   Comment:    Kinjal Walker - 13 Oct 2017 1:57 PM     TASK CREATED  PT CALLED INQUIRING ON RESULTS OF HEAD CT AND XRAYS Sabiha Friends 10/10/17 SL  PER YOUR LAST OFFICE NOTE, NO ORDER FOR F/U WAS IN CHART SO PT DOES NOT HAVE ROV  PLEASE REVIEW RESULTS AND CALL PT TO DISCUSS OR ADVISE IF YOU WANT ROV SCHEDULED  Srinivasa Iniguez - 17 Oct 2017 10:19 AM     TASK REPLIED TO: Previously Assigned To Edouard Anton without change  Pressure on valve at 60 - 70     Shunt system is intact   Kinjal Walker - 17 Oct 2017 10:59 AM     TASK REASSIGNED: Previously Assigned To Kinjal Walker  PLEASE SEE BELOW  TY  PT WOULD LIKE A CALL  Costenbader,Rondel - 18 Oct 2017 11:16 AM     TASK EDITED  Spoke with pt and informed her of results per DKO task  Pt states she is having  daily pressure in frontal area and sometimes whole head  Pt started Nortriptyline and it caused severe diarrhea  She stopped it but wants to try it again  Pt was advised to contact Neurology concerning HA's and Nortriptyline (if diarrhea occurs again)  Pt stated an understanding          Signatures   Electronically signed by : Yusef Cardona, ; Oct 18 2017 11:17AM EST                       (Author)

## 2018-01-13 NOTE — CONSULTS
Assessment   1  Pseudotumor cerebri (348 2) (G93 2)  2  Presence of lumboperitoneal shunt (V45 2) (Z98 2)    Plan  Presence of lumboperitoneal shunt, Pseudotumor cerebri    · Follow-up After Procedure Evaluation and Treatment  Follow-up sovl Dr Flex Conde after  completion of lumbar puncture (which is already ordered per Dr Beatriz Jackson and scheduled  [facility] 4[facility]  Status: Hold For - Scheduling  Requested  for: 27GBS3964  Ordered; For: Presence of lumboperitoneal shunt, Pseudotumor cerebri;  Ordered By:   Jennifer Lujan  Performed:   Due: 54OMH7108    Discussion/Summary    Ms Lan Cardoza is a 42year old female who presents for evaluation of history of pseudotumor cerebra w/ LP shunt which was placed in 2015 by provider at outside hospital for history of pseudotumor cerebri  MRI brain demonstrates no acute disease  The ventricles are of lower limits of normal in size which is common with history of pseudotumor cerebri  Lumbar spine x-ray (3/11/17) reports presence of LP shunt with portions of the tubing within the posterior paraspinal tissues and the left lateral abdominal wall appear partially kinked  The tube tip projects over the left paracentral upper pelvis  Upon review of lumbar x-ray by Dr Flex Conde it appears the LP shunt tubing enters the spinal canal at L3 level and extends cephalad and loops inside the spinal canal  Dr Flex Conde discussed with patient that it is unknown whether or not her LP shunt is functioning currently  Patient is inquiring if the LP shunt system may be removed given her history of recent weight loss following gastric bypass surgery  She is already scheduled (for 4/5/17) to undergo a lumbar puncture with measurement of opening pressure as requested by Neurology  She is advised to follow-up with Dr Flex Conde after this is completed to discuss about having the LP shunt system removed and determine if there is a need to have a  shunt placed instead       Patient reports she underwent visual field testing with ophthalmology 3/16/2017  Our office has confirmed this as well with Dr Reginaldo Javed office and they will fax over a copy of patient's recent visual field study  Patient was advised to contact our office / present to emergency room should she experience sudden severe headache, visual loss, mental status change, weakness, gait difficulty, or other neurological change  Patient expressed understanding and agreement  The patient has the current Goals: She would like to have the LP shunt remove  The patent has the current Barriers: History of pseudotumor cerebri  Patient is able to Self-Care  The treatment plan was reviewed with the patient/guardian  The patient/guardian understands and agrees with the treatment plan      Chief Complaint  New patient presents for consult  History of Present Illness  Ms Clayton Thornton is a 42year old female who presents for neurosurgical evaluation of history of pseudotumor cerebri with LP shunt  She is referred today by Lilia clayton/ [facility] Neurology  Operative report from 11/19/15 (Dr Jamey Smith at [facility]) indicated patient underwent placement of lumboperitoneal shunt system (HV valve made by LESLIE CLAYTON  [employer]  This was a horizontal 50-80mm of water and vertical was 230-320mm of water) for idiopathic intracranial hypertension  According to Dr Sariah Domínguez operative report patient's opening pressure was 430mm of water at his facility sometime prior to the LP surgery  She reportedly was not tolerating Diamox well and she had history of kidney stones  She reportedly had presented with severe intractable headaches, difficulty with her balance, and bilateral papilledema prior to her LP surgery  Patient reported her headaches began in August 2015  She has history of obesity  Patient reports she had cognitive difficulty prior to the LP shunt placement   Patient reports she is not sure if she had any visual difficulty prior to the LP shunt placement  Patient reports after LP shunt placement she was experiencing left lower extremity pain and abdominal pain  She reports her headaches did not really improve following placement of LP shunt  She had been following with Neurology at [facility] for intractable headaches and was suggested acupuncture  She reports acupuncture did not improve her headaches but it did resolve her left lower extremity pain  Patient has tried Fioricet, tramadol, and Toradol without much relief of headache  She reports nortriptyline has provided some headache relief but still describes pain in the right occipital region  She reports she went on to undergo gastric bypass surgery with Dr Silver Ochoa in December 2016 at [facility]  Patient reports when Dr Silver Ochoa and was performing her gastric bypass surgery that he purposefully cut her shunt shunt tubing shorter  Patient reports after the shunt tubing was cut shorter by Dr Silver Ochoa in Dec  2016 that her abdominal pain resolved  Patient reports since undergoing the gastric bypass surgery in December 2016 that she has lost approximately 43 pounds  Patient reports her headaches improved a little bit since the gastric bypass surgery  Patient reports her Neurologist at [facility] referred her to Dr Jonah Sadler (Neurology Headache Specialist at [place] [address])  Patient reports she saw Dr Jonah Sadler for initial consult on 3/10/2017 for history of occipital headaches and pseudotumor cerebri -- recommended Verapamil (not taking currently), referred for MRI Brain and MRA head, and referred for ophthalmology evaluation  Patient reports she presented to [place] [address] Emergency Room on 3/11/17 as she was experiencing headaches  She had a CT head 3/11/17 which reported no acute intracranial abnormality  There was no hydrocephalus   Lumbar spine x-ray (3/11/17) reported presence of LP shunt with portions of the tubing within the posterior paraspinal tissues and the left lateral abdominal wall appear partially kinked  The tube tip projects over the left paracentral upper pelvis  She received Zyprexa, Reglan, and Toradol in ER  Patient was recommended to undergo outpatient lumbar puncture  She was discharged from ER  Dr Candance Gehrig has ordered Lumbar Puncture w/ measuring of opening pressure which patient reports is scheduled for 4/5/17  Patient underwent ophthalmology evaluation and visual field testing on 3/16/17 through Dr James Pond -- office note indicated Optic nerve w/ pink sharp normal rim of both eyes  Ophthalmology note reported visual fields within normal limits  Our office has requested that the images of visual field testing from 3/16/17 be faxed to our office  MRI Brain 3/27/17 reported no acute disease  Ventricles lower limits of normal in size consistent with pseudotumor cerebri  Scattered parenchymal changes which are nonspecific but could reflect presence of complicated migraine  Radiologist suggested other possible etiologies include collagen vascular disease or Lyme disease  MRA head 3/27/17 reported no large vessel flow restrictive disease  No indication of aneurysm of other vascular pathology  Patient reports her headaches can encompass her "whole head"  She currently rates headache as 5/10 pain scale  Patient reports she continues to also experience pain right occipital region rated 6-7/10 pain scale  She reports occasional quick stabbing pain which can vary anywhere in her head  She reports occasional vomiting (last occurrence 2 weeks ago) when she experiences a headache  She denies nausea  She denies vision loss  She reports occasional diplopia when she is tired and reports history of "crossed eyed" since childhood for which she underwent bilateral eye surgery in 2nd grade  She reports occasional brief blurry vision since 2015 -- can last about 30 minutes -- pt  unaware of trigger  She denies focal weakness   She ambulates independent and denies gait difficulty  Review of Systems    Constitutional: feeling tired, but no fever, not feeling poorly and no chills  Eyes: no eye pain, no eyesight problems, eyes not red and no purulent discharge from the eyes  ENT: earache, but no nosebleeds, no sore throat, no hearing loss, no nasal discharge and no hoarseness  Cardiovascular: the heart rate was not slow, no chest pain, the heart rate was not fast and no palpitations  Respiratory: no shortness of breath and no wheezing  Gastrointestinal: no abdominal pain, no nausea, no vomiting, no constipation, no diarrhea and no blood in stools  Genitourinary: no dysuria and no incontinence  Musculoskeletal: no joint swelling, no limb pain, no joint stiffness and no limb swelling  Integumentary: no rashes, no itching, no breast pain, no skin lesions, no skin wound and no breast lump  Neurological: headache, but no numbness, no tingling, no confusion, no dizziness, no limb weakness, no convulsions, no fainting and no difficulty walking  Psychiatric: no anxiety, no sleep disturbances and no depression  Hematologic/Lymphatic: no tendency for easy bleeding and no tendency for easy bruising  ROS reviewed  Active Problems   1  Occipital headache (784 0) (R51)  2  Presence of lumboperitoneal shunt (V45 2) (Z98 2)  3  Pseudotumor cerebri (348 2) (G93 2)  4  Right rotator cuff tendinitis (726 10) (M75 81)  5  Subacromial bursitis, right (726 19) (M75 51)    Past Medical History   1  History of obesity (V13 89) (Z86 39)  2  History of visual field defect (V12 49) (Z86 69)  3  History of Other disorders of optic nerve, not elsewhere classified, 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)   (H47 092)  4  History of Papilledema, both eyes (377 00) (H47 10)    The active problems and past medical history were reviewed and updated today  Surgical History   1  History of Eye Surgery  2  History of Gastric Surgery For Morbid Obesity Gastric Bypass  3   History of Spinal CSF Shunt    The surgical history was reviewed and updated today  Family History  Brother   1  Family history of malignant neoplasm of thyroid (V16 8) (Z80 8)  Paternal Grandmother   2  Family history of   3  Family history of malignant neoplasm (V16 9) (Z80 9)  Maternal Grandfather   4  Family history of Colon cancer  5  Family history of   Paternal Grandfather   10  Family history of   7  Family history of malignant neoplasm (V16 9) (Z80 9)    The family history was reviewed and updated today  Social History    · Employed   · Former smoker (B72 84) (A73 333)   · Has 2 children   · High school or GED   · Non drinker / no alcohol use   · Sexually active   · Single  The social history was reviewed and updated today  Current Meds  1  Biotin 1 MG Oral Capsule; 4 gummies daily; Therapy: 27IXR8276 to Recorded  2  Calcium 500-100 MG-UNIT Oral Tablet Chewable; 6 TABLETS DAILY; Therapy: 56OAE4222 to Recorded  3  Fiber Select Gummies Oral Tablet Chewable; 2 gummies daily; Therapy: 01PNW1601 to Recorded  4  Nortriptyline HCl - 10 MG Oral Capsule; 1 CAPSULE TWICE DAILY; Therapy: 57TUF3102 to Recorded  5  Vitamin B-12 1000 MCG Oral Tablet; 1 tablet daily; Therapy: 78PGA2591 to Recorded  6  Vitamin D3 2000 UNIT Oral Capsule; 2 CAPSULES DAILY; Therapy: 41YIK4510 to Recorded    The medication list was reviewed and updated today  Allergies   1  Benadryl  2  Phenergan    Vitals  Vital Signs    Recorded: 23OXS9931 01:07PM   Temperature 98 5 F, Tympanic   Heart Rate 63   Respiration 16   Systolic 888, LUE, Sitting   Diastolic 82, LUE, Sitting   Height 5 ft 3 in   Weight 174 lb    BMI Calculated 30 82   BSA Calculated 1 82     Physical Exam     Constitutional Patient appears healthy and well developed  No signs of acute distress present  Respiratory Respiratory effort: Normal   Auscultation of lungs: Clear to auscultation bilaterally     Cardiovascular Auscultation of heart: Rate is regular  Rhythm is regular  No heart murmur appreciated  Skin Mature scar left side of low back, left flank, and lateral left abdominal region  Neurologic - Mental Status: Alert and Oriented x3  Mood and affect: Affect is normal   Speech is articulated and fluent  Grossly nonfocal     Cranial Nerve Exam:  2nd CN: PERRLA  Visual acuity: Right eye 20/20 and Left eye 20/20  3rd, 4th, and 6th cranial nerves: Normal with no deficit  5th CN: Sensation to LT intact b/l V1-V3  Masseter intact bilaterally  7th cranial nerve: Face symmetrical at grimace and at rest  8th cranial nerve: Grossly intact to finger rub bilaterally  9th and 10th cranial nerves: Uvula is midline  11th cranial nerve: Shoulder shrug equal bilaterally  12th cranial nerve: Tongue mideline, no atrophy present  Motor System - Upper Extremities: Normal to inspection and palpation  Strength: Deltoids 5/5 bilaterally  Biceps 5/5 bilaterally  Triceps 5/5 bilaterally  Extensor carpi radials is 5/5 bilaterally  Extensor digitorum 5/5 bilaterally  Intrinsic 5/5 bilaterally   5/5 bilaterally  Motor System - Lower Extremities: Normal to inspection and palpation  Strength: iliopsoas 5/5 bilaterally  Quadriceps 5/5 bilaterally  Hamstrings 5/5 bilaterally  Gastrocnemius 5/5 bilaterally  Coordination: Finger to nose -- touches top of nose bilaterally  PRIMITIVO intact fingers bilaterally  Heel down shin smooth and symmetric  Finger/nose/finger intact bilaterally  No pronator drift  Involuntary movements: None   Sensory: Sensation grossly intact to light touch  Sensation grossly intact to light touch  Gait and Station: [place] with a normal gait  Independent  Tandem walk intact  Results/Data    I personally reviewed the mri brain and lumbar xray in detail with the patient      X-ray Review Lumbar spine x-ray (3/11/17) reports presence of LP shunt with portions of the tubing within the posterior paraspinal tissues and the left lateral abdominal wall appear partially kinked  The tube tip projects over the left paracentral upper pelvis  Upon review of lumbar x-ray by Dr Ella Mera it appears the LP shunt tubing enters the spinal canal at L3 level and extends cephalad and loops inside the spinal canal  * MRI BRAIN WO CONTRAST [id number] 12:10PM Lisa Kinney Order Number: [id number]    - Patient Instructions: To schedule this appointment, please contact Central Scheduling at [phone]  Test Name Result Flag Reference   MRI BRAIN WO CONTRAST (Report)     MRI BRAIN WITHOUT CONTRAST     INDICATION: History of pseudotumor     COMPARISON:  CT brain 3/11/2017     TECHNIQUE: Sagittal T1, axial T2, axial FLAIR, axial T1, axial Lugoff and axial diffusion imaging  IMAGE QUALITY: Diagnostic  FINDINGS:     BRAIN PARENCHYMA: No acute disease  There is no acute ischemic comment cranial mass, mass effect or edema  Nonspecific white matter changes are present in the supratentorial parenchyma  These could reflect sequela of complicated migraine, collagen    vascular disease, Lyme disease  VENTRICLES: Ventricles are lower limits of normal in size, consistent with pseudotumor cerebri  Superior overtly stable when compared to CT performed 16 days earlier  SELLA AND PITUITARY GLAND: Normal      ORBITS: Normal      PARANASAL SINUSES: Trace mucosal thickening  Bilateral mastoid fluid  VASCULATURE: Evaluation of the major intracranial vasculature demonstrates appropriate flow voids  CALVARIUM AND SKULL BASE: Normal      EXTRACRANIAL SOFT TISSUES: Normal        IMPRESSION:     No acute disease  Ventricles lower limits of normal in size consistent with pseudotumor cerebri  Scattered parenchymal changes which are nonspecific but could reflect presence of complicated migraine  Other considerations included above  Workstation performed: [id number]     Signed by:    Jailene Singleton MD   3/28/17     Future Appointments    Date/Time Provider Specialty Site   04/21/2017 11:15 AM Kapil Rangel, [facility] Neurology [facility]  [place]   04/18/2017 01:30 PM FRANCOIS Hill   Neurosurgery [facility]     Signatures   Electronically signed by : Franklyn Wynne [facility]; Mar 31 2017  3:59PM EST                       (Author)    Electronically signed by : FRANCOIS Rice ; Apr 4 2017  9:49AM EST                       (Author)

## 2018-01-14 ENCOUNTER — GENERIC CONVERSION - ENCOUNTER (OUTPATIENT)
Dept: INTERNAL MEDICINE CLINIC | Facility: CLINIC | Age: 43
End: 2018-01-14

## 2018-01-14 ENCOUNTER — GENERIC CONVERSION - ENCOUNTER (OUTPATIENT)
Dept: OTHER | Facility: OTHER | Age: 43
End: 2018-01-14

## 2018-01-14 VITALS
TEMPERATURE: 97 F | SYSTOLIC BLOOD PRESSURE: 109 MMHG | BODY MASS INDEX: 26.4 KG/M2 | RESPIRATION RATE: 16 BRPM | DIASTOLIC BLOOD PRESSURE: 65 MMHG | HEIGHT: 63 IN | HEART RATE: 56 BPM | WEIGHT: 149 LBS

## 2018-01-14 VITALS
HEART RATE: 48 BPM | BODY MASS INDEX: 23.92 KG/M2 | SYSTOLIC BLOOD PRESSURE: 115 MMHG | HEIGHT: 63 IN | DIASTOLIC BLOOD PRESSURE: 73 MMHG | WEIGHT: 135 LBS | RESPIRATION RATE: 16 BRPM | TEMPERATURE: 96.5 F

## 2018-01-14 VITALS
TEMPERATURE: 98.5 F | BODY MASS INDEX: 30.83 KG/M2 | RESPIRATION RATE: 16 BRPM | SYSTOLIC BLOOD PRESSURE: 120 MMHG | DIASTOLIC BLOOD PRESSURE: 82 MMHG | HEIGHT: 63 IN | HEART RATE: 63 BPM | WEIGHT: 174 LBS

## 2018-01-14 VITALS
HEART RATE: 66 BPM | TEMPERATURE: 98.2 F | SYSTOLIC BLOOD PRESSURE: 112 MMHG | HEIGHT: 63 IN | BODY MASS INDEX: 25.43 KG/M2 | OXYGEN SATURATION: 99 % | WEIGHT: 143.5 LBS | DIASTOLIC BLOOD PRESSURE: 86 MMHG

## 2018-01-14 NOTE — MISCELLANEOUS
2017        [place] Appeals Dept :       Dawson Lealraimundo ,  1975 [place] number is [id number] had a Ct scan of her head done on 2017   Pt was having  problems and moved her scan up we originally sceduled for her for 2017 , this was approved on 2017 [facility] Dates number is [id number] ,but we gave the date of service as of                 2017   Pt went to [facility] tax id is [id number]   Please retro this request to cover her ct scan done there on 2017   Pt had surgery on 2017 placement of programable ventricular -peritioneal shunt , removal of Lp shunt right   Pt was complaining  of headaches , right sided head pressure and abdominal pain , Ct scan was needed   we already contacted EVELYN on 08/10/2017 and gave all this information we spoke with Gurwinder larry who gave us a tracking number as [id number]           Thank  [address] [place] [address] [facility]    [phone] '

## 2018-01-14 NOTE — MISCELLANEOUS
Message   Recorded as Task   Date: 08/24/2017 04:04 PM, Created By: Radhika Alva   Task Name: Care Coordination   Assigned To: Radhika Alva   Regarding Patient: Abner , Status: Active   Comment:    Marian Medina - 24 Aug 2017 4:04 PM     TASK CREATED  Pt requested a call back  No further information left  Returned call with no answer and message left to call back at her convenience  Marian Medina - 25 Aug 2017 10:18 AM     TASK EDITED  Pt reports having head pain and a "swooshing sound"  She reports feeling "zingers" that go slowly from 1 side to the other, and head aches on the top and front of head  Pt denies any other symptoms  Reviewed complaints with Jasbir Jorge who suggested she f/u with Neurology for these complaints  Informed pt who was agreeable          Signatures   Electronically signed by : Esau Kelsey, ; Aug 25 2017 10:18AM EST                       (Author)

## 2018-01-15 ENCOUNTER — GENERIC CONVERSION - ENCOUNTER (OUTPATIENT)
Dept: OTHER | Facility: OTHER | Age: 43
End: 2018-01-15

## 2018-01-15 NOTE — PROGRESS NOTES
Assessment    1  Pseudotumor cerebri (348 2) (G93 2)    Plan     · (1) APTT; Status:Active; Requested for:2017;    Perform:[facility]; [id number]; Ordered; For:Subacromial bursitis, right; Ordered By:Edouard Anton;   · (1) CBC/PLT/DIFF; Status:Active; Requested for:2017;    Perform:[facility]; MKP:[id number]; Ordered; For:Subacromial bursitis, right; Ordered By:Edouard Anton;   · (1) COMPREHENSIVE METABOLIC PANEL; Status:Active; Requested for:2017;    Perform:[facility]; UJQ:[id number]; Ordered; For:Subacromial bursitis, right; Ordered By:Edouard Anton;   · (1) HEMOGLOBIN A1C; Status:Active; Requested for:2017;    Perform:[facility]; JTY:[id number]; Ordered; For:Subacromial bursitis, right; Ordered By:Edouard Anton;   · (1) PREGNANCY TEST (HCG QUALITATIVE); Status:Active; Requested for:2017;    Perform:[facility]; ZKS:[id number]; Ordered; For:Subacromial bursitis, right; Ordered By:Edouard Anton;   · (1) PT WITH INR; Status:Active; Requested for:2017;    Perform:[facility]; LNP:[id number]; Ordered; For:Subacromial bursitis, right; Ordered By:Edouard Anton;   · (1) TYPE & SCREEN; Status:Active; Requested for:2017;    Perform:[facility]; Order Comments:prelim screening for antibodies; Due:2018; Ordered; For:Subacromial bursitis, right; Ordered By:Edouard Anton;  the patient pregnant or have they been in the last 90 days? : No  the patient been transfused in the last 3 months? : No  number of units for surgical date : 2  of Surgery : [id number]   · (1) URINALYSIS w URINE C/S REFLEX (will reflex a microscopy if leukocytes, occult  blood, or nitrites are not within normal limits); Status:Active; Requested for:2017;    Perform:[facility]; ALU:[id number]; Ordered; For:Subacromial bursitis, right; Ordered By:Edouard Anton;    Schedule Surgery Treatment  Procedure:    1  Image guided placement of ventriculoperitoneal shunt   2   removal of LP shunt  Status: Hold For - Scheduling  Requested for: 27Apr2017  Ordered; For: Health Maintenance, Occipital headache, Presence of lumboperitoneal shunt, Pseudotumor cerebri, Right rotator cuff tendinitis, Subacromial bursitis, right;  Ordered By: Jesus Law  Performed:   Due: [id number]     Discussion/Summary    This is a complex 71-year-old female with pseudotumor cerebri  She believes her headaches are worse  These were improved following the LP shunt placement  The valve is bothering her and she feels the system has always worked intermittantly  Her lumbar puncture pressure was elevated and it is for these reasons that I recommneded the following surgical intervention: 1  Image guided right coronal placement of programable ventriculoperitoneal shunt  2  removal of LP shunt  In a detailed way, I spoke about the risks, possibility of complication, indications and alternatives to care  The patient asked informed questions reflecting understanding  All questions were answered  They stated a desire to proceed with the above plan  Chief Complaint  Patient presents for Follow-up after completion of lumbar puncture  Dx: Presence of lumboperitoneal shunt, Pseudotumor cerebri       History of Present Illness  Patient is a 40y old female who presented on 3/30/17 for evaluation of history of pseudotumor cerebra w/LP shunt which was placed in 2015  Operative report from 11/19/15 (Dr Fredy Cutler at [facility]) indicated patient underwent placement of lumboperitoneal shunt system (HV valve made by [employer]  This was a horizontal 50-80mm of water and vertical was 230-320mm of water) for idiopathic intracranial hypertension  According to Dr Israel Erazo operative report patient's opening pressure was 430mm of water at his facility sometime prior to the LP surgery   She reportedly was not tolerating Diamox well and she had history of kidney stones  She reportedly had presented with severe intractable headaches, difficulty with her balance, and bilateral papilledema prior to her LP surgery  Patient reported her headaches began in August 2015  She has history of obesity  Patient reports she had cognitive difficulty prior to the LP shunt placement  Patient reports she is not sure if she had any visual difficulty prior to the LP shunt placement  Patient reports after LP shunt placement she was experiencing left lower extremity pain and abdominal pain  She reports her headaches did not really improve following placement of LP shunt  She had been following with Neurology at [facility] for intractable headaches and was suggested acupuncture  She reports acupuncture did not improve her headaches but it did resolve her left lower extremity pain  Patient has tried Fioricet, tramadol, and Toradol without much relief of headache  She reports nortriptyline has provided some headache relief but still describes pain in the right occipital region  She reports she went on to undergo gastric bypass surgery with Dr Silver Ochoa in December 2016 at [facility]  Patient reports when Dr Silver Ochoa and was performing her gastric bypass surgery that he purposefully cut her shunt shunt tubing shorter  Patient reports after the shunt tubing was cut shorter by Dr Silver Ochoa in Dec  2016 that her abdominal pain resolved  Patient reports since undergoing the gastric bypass surgery in December 2016 that she has lost approximately 43 pounds  Patient reports her headaches improved a little bit since the gastric bypass surgery  Patient reports her Neurologist at [facility] referred her to Dr Jonah Sadler (Neurology Headache Specialist at [place] [address])   Patient reports she saw Dr Jonah Sadler for initial consult on 3/10/2017 for history of occipital headaches and pseudotumor cerebri -- recommended Verapamil (not taking currently), referred for MRI Brain and MRA head, and referred for ophthalmology evaluation  Patient reports she [facility] on 3/11/17 as she was experiencing headaches  She had a CT head 3/11/17 which reported no acute intracranial abnormality  There was no hydrocephalus  Lumbar spine x-ray (3/11/17) reported presence of LP shunt with portions of the tubing within the posterior paraspinal tissues and the left lateral abdominal wall appear partially kinked  The tube tip projects over the left paracentral upper pelvis  She received Zyprexa, Reglan, and Toradol in ER  Patient was recommended to undergo outpatient lumbar puncture  She was discharged from ER  Patient underwent ophthalmology evaluation and visual field testing on 3/16/17 through Dr Arleen Limon -- office note indicated Optic nerve w/ pink sharp normal rim of both eyes  Ophthalmology note reported visual fields within normal limits  MRI Brain 3/27/17 demonstrates no acute disease  The ventricles are of lower limits of normal in size which is common with history of pseudotumor cerebri  Lumbar spine x-ray (3/11/17) reports presence of LP shunt with portions of the tubing within the posterior paraspinal tissues and the left lateral abdominal wall appear partially kinked  The tube tip projects over the left paracentral upper pelvis  Upon review of lumbar x-ray by Dr Tucker Ahmadi it appears the LP shunt tubing enters the spinal canal at L3 level and extends cephalad and loops inside the spinal canal  Dr Tucker Ahmadi discussed with patient that it is unknown whether or not her LP shunt is functioning currently  Patient is inquiring if the LP shunt system may be removed given her history of recent weight loss following gastric bypass surgery  She is already scheduled (for 4/5/17) to undergo a lumbar puncture with measurement of opening pressure as requested by Neurology  She is advised to follow-up after completion      Today, she complaints of constant headaches that have been worse lately, blur vision at times, ringing in both ears, and off balance in general       Review of Systems    Constitutional: No fever, no chills, feels well, no tiredness, no recent weight gain or weight loss  Eyes: eyesight problems, but as noted in HPI    ENT: tinnitus, but as noted in HPI  Cardiovascular: No complaints of slow heart rate, no fast heart rate, no chest pain, no palpitations, no leg claudication, no lower extremity edema  Respiratory: No complaints of shortness of breath, no wheezing, no cough, no SOB on exertion, no orthopnea, no PND  Gastrointestinal: No complaints of abdominal pain, no constipation, no nausea or vomiting, no diarrhea, no bloody stools  Genitourinary: No complaints of dysuria, no incontinence, no pelvic pain, no dysmenorrhea, no vaginal discharge or bleeding  Musculoskeletal: No complaints of arthralgias, no myalgias, no joint swelling or stiffness, no limb pain or swelling  Integumentary: No complaints of skin rash or lesions, no itching, no skin wounds, no breast pain or lump  Neurological: headache, but as noted in HPI, no numbness, no tingling, no dizziness, no limb weakness and no difficulty walking  Psychiatric: Not suicidal, no sleep disturbance, no anxiety or depression, no change in personality, no emotional problems  Endocrine: No complaints of proptosis, no hot flashes, no muscle weakness, no deepening of the voice, no feelings of weakness  Hematologic/Lymphatic: No complaints of swollen glands, no swollen glands in the neck, does not bleed easily, does not bruise easily  ROS reviewed  Active Problems    1  Occipital headache (784 0) (R51)   2  Presence of lumboperitoneal shunt (V45 2) (Z98 2)   3  Pseudotumor cerebri (348 2) (G93 2)   4  Right rotator cuff tendinitis (726 10) (M75 81)   5  Subacromial bursitis, right (726 19) (M75 51)    Past Medical History    1  History of obesity (V13 89) (Z86 39)   2   History of visual field defect (V12 49) (Z86 69)   3  History of Other disorders of optic nerve, not elsewhere classified, 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)   (H47 092)   4  History of Papilledema, both eyes (377 00) (H47 10)    The active problems and past medical history were reviewed and updated today  Surgical History    1  History of Eye Surgery   2  History of Gastric Surgery For Morbid Obesity Gastric Bypass   3  History of Spinal CSF Shunt    The surgical history was reviewed and updated today  Family History  Brother    1  Family history of malignant neoplasm of thyroid (V16 8) (Z80 8)  Paternal Grandmother    2  Family history of    3  Family history of malignant neoplasm (V16 9) (Z80 9)  Maternal Grandfather    4  Family history of Colon cancer   5  Family history of   Paternal Grandfather    10  Family history of    7  Family history of malignant neoplasm (V16 9) (Z80 9)    The family history was reviewed and updated today  Social History    · Employed   · Former smoker (H63 35) (J99 390)   · Has 2 children   · High school or GED   · Non drinker / no alcohol use   · Sexually active   · Single  The social history was reviewed and updated today  Current Meds   1  Biotin 1 MG Oral Capsule; 4 gummies daily; Therapy: 07SHT1817 to Recorded   2  Calcium 500-100 MG-UNIT Oral Tablet Chewable; 6 TABLETS DAILY; Therapy: 53ONT3528 to Recorded   3  Fiber Select Gummies Oral Tablet Chewable; 2 gummies daily; Therapy: 29OZV2823 to Recorded   4  Fioricet -40 MG Oral Capsule; TAKE 1 CAPSULE  PRN; Therapy: (Yanet Payer) to Recorded   5  Verapamil HCl  MG Oral Capsule Extended Release 24 Hour; take one capsule   by mouth qHS; Therapy: 49NXD8378 to (Evaluate:2017)  Requested for: 2017; Last   Rx:2017 Ordered   6  Vitamin B-12 1000 MCG Oral Tablet; 1 tablet daily; Therapy: 91KGW4549 to Recorded   7  Vitamin D3 2000 UNIT Oral Capsule; 2 CAPSULES DAILY;    Therapy: 54ZJW8617 to Recorded    The medication list was reviewed and updated today  Allergies    1  Benadryl   2  Phenergan    Vitals  Vital Signs    Recorded: 72PUJ9976 04:00PM   Temperature 98 7 F   Heart Rate 63   Respiration 16   Systolic 195   Diastolic 71   Height 5 ft 3 in   Weight 164 lb 4 oz   BMI Calculated 29 1   BSA Calculated 1 78   Pain Scale 5     Physical Exam     Mental Status: Alert and Oriented x3  Memory is intact  Attentive  Speech is articulate and fluent  Knowledge and vocabulary consistent with education  Grossly nonfocal     Cranial Nerve Exam:  2nd cranial nerve: Normal with no noted deficit  3rd, 4th, and 6th cranial nerves: PERRLA and EOMI without later gaze nystagmus  7th cranial nerve: Face symmetrical at grimace and at rest   8th cranial nerve: Grossly intact to finger rub bilaterally  11th cranial nerve: Shoulder shrug equal bilaterally  Motor System - Upper Extremities: Muscle strength: 5/5 bilaterally  Muscle tone: Normal bilaterally  Muscle Bulk: Normal Muscle bulk bilaterally  Motor System - Lower Extremities: Muscle strength: 5/5 bilaterally  Muscle tone: Normal bilaterally  Muscle Bulk: Normal Muscle bulk bilaterally  Reflexes: Reflexes: Bicep reflex intact, brachioradialis reflex intact, tricep reflex intact, achilles reflex intact, patellar reflex intact bilaterally and fine finger movement  Babinski's reflex is down going bilaterally  Law's sign is absent bilaterally  Coordination: Coordination: Finger to nose was normal, heel to knee to shin was normal able to perform rapid alternating movements well bilaterally  Involuntary movements: None   Sensory: Sensation:Sensation grossly intact to light tough and pinprick  Gait and Station: Gait and station: [place] with a normal gait and station  Tandem walk is normal, Heel walk and toe walk intact and without sign of paresis         Results/Data  Diagnostic Studies Reviewed [facility]:   I personally reviewed the in detail with the patient  Testing LP opening pressure 25  Future Appointments    Date/Time Provider Specialty Site   05/24/2017 09:30 AM Sweetie Rea MD Neurology [facility] [address]   06/14/2017 01:00 PM Jalen Jacobs [facility]surgery [facility]   05/31/2017 08:30 AM FRANCOIS Bragg  [address]  INPATIENT   07/13/2017 03:15 PM FRANCOIS Bragg  Neurosurgery [address] [facility]     Surgery Scheduling Form    Location: [place]   Confirmation Number:   Procedure Date: 05/17/17   Requested Time:   Surgeon: Devonte Case  Co-Surgeon:   [facility] Required:   Bed: ICU Bed Required  Anesthesia: General      PROCEDURE DETAILS   Procedure: Image guided right coronal placement of programable ventriculoperitoneal shunt, removal of LP shunt  Laterality/Level: Right  Anticipated frozen section:   CPT Code(s): U7045805, [id number]   Pre-Op Diagnosis: Pseudotumor cerebri  Dx Code(s): G93 2  Length of Procedure: 2 hours  Equipment: Positron  Equipment Needs: Supine  Implants/Representative: Eran   Is the patient able to walk up a flight of stairs, walk up a hill or do heavy housework WITHOUT having chest pain or shortness of breath? REGISTRATION & FINANCIAL CLEARANCE    FA Initials:   Insurance: [facility]6 GOGO Chavez [facility] Number: [id number] Group Number:     PRE-ADMISSION TESTING/CLINICAL INFORMATION   PAT Location: Outpatient Testing Elsewhere   PAT Comments: OUTPT AT [facility] 2 - ASAP  Type & Screen and Prepare RBC needed  Number of units: 2   Communication Barrier:   Primary Care Physician: [facility]  CONSULTS NEEDED:   Anesthesia Consult: No Anesthesia consult needed   Medical Consult: YES,Medical consult with [facility]   Cardiac Consult: No Cardiac consult needed No Other consult needed     ALLERGIES AND ALERTS   Latex Allergy: NO   Penicillin Allergy: NO   Malignant Hyperthermia: NO   Diabetic Patient: NO   Height: 5'3"  Weight: 74 5 KG  COMMENTS   Scheduling Information Provided by: TU  IN OFFICE USE   Urgency:   Craniotomy Details: Right  Lab Studies Ordered: Full Labs Ordered, Medically Cleared   [address] 5/8/17     Films   Bracing:   Bone Stimulator        Signatures   Electronically signed by : FRANCOIS Lamar ; May 19 2017  5:21PM EST                       (Author)

## 2018-01-16 ENCOUNTER — GENERIC CONVERSION - ENCOUNTER (OUTPATIENT)
Dept: OTHER | Facility: OTHER | Age: 43
End: 2018-01-16

## 2018-01-16 NOTE — MISCELLANEOUS
Message   Recorded as Task   Date: 05/30/2017 10:55 AM, Created By: August Eisenberg   Task Name: Care Coordination   Assigned To: August Eisenberg   Regarding Patient: Clarisa Carrera, Status: Active   Comment:    Marian Medina - 30 May 2017 10:55 AM     TASK CREATED  Spoke with pt re: upcoming surgery scheduled Wed  5/31/17  Medication allergies and pharmacy confirmed  Pt denies taking any blood thinning medications  She is aware of, and understands bathing instructions  She was informed she will receive a call later this afternoon or early evening informing her of time to arrive in the AM   She does report she had recent gastric bypass and takes numerous medications in gummy form that she must continue  Suggested she take her medications along if concerned  They will be taken and sent to the in pt pharmacy and dispensed from there  She stated an understanding, and offered no further questions or concerns          Signatures   Electronically signed by : Cecilio Peters, ; May 30 2017 10:56AM EST                       (Author)

## 2018-01-16 NOTE — MISCELLANEOUS
Message   Recorded as Task   Date: 05/12/2017 01:10 PM, Created By: Mary Cole   Task Name: Driscilla Rubinstein   Assigned To: Mary Cole   Regarding Patient: Harini DIAZ, Status: Active   CommentBettina Poster - 12 May 2017 1:10 PM     Verify task generated in Scan batch basket  Mary Cole - 16 May 2017 2:24 PM     TASK EDITED  Called patient to follow up, per Janey Richmond, patient wanted to s/w nurse  Patient is very anxious  Her surgery for tomorrow was cancelled due to Dr Wojciech Vegas needing to do another surgery  She states that "since she has been diagnosed with this" she has been feeling really terrible  She has a constant headache that will not let up despite medication treatments  She denies any nausea or vomiting, but she has been experiencing some dizziness here and there and just does not feel right  I explained to the patient that if these symptoms do not let up that she needs to present to the ED  Patient states that she does not want to go to the ED because she "has state insurance and does not want to make other people have to pay for her"  She also said "all they are going to want to do is a spinal tap and they do not do them with anesthesia in the ER, so I am just going to refuse"  I explained to patient that they could at least do some imaging to make sure nothing is going on and maybe she could be provided some pain medication in the short term to make her feel better  She is still not convinced to go to the ED  Advised her, will s/w PA and give her a call back if they have any other suggestions  She verbalized understanding  Mary Cole - 16 May 2017 2:32 PM     TASK EDITED  Spoke with Balbir Maldonado regarding patient and her symptoms  He states that there is nothing that can be done in the meantime  If patient does experience a worsening of symptoms that she should report to the ED  This information was provided to Cristian Cassidy  She verbalized understanding  Signatures   Electronically signed by : Ana Suárez RN; May 16 2017  2:32PM EST                       (Author)

## 2018-01-16 NOTE — PROGRESS NOTES
Assessment    1  Encounter for postoperative care (V58 49) (Z48 89)   2  Pseudotumor cerebri (348 2) (G93 2)    Plan  Health Maintenance, Occipital headache, Presence of lumboperitoneal shunt,  Pseudotumor cerebri, Right rotator cuff tendinitis    · * CT HEAD WO CONTRAST; Status:Active; Requested PWY:[id number];    Perform:[facility] Radiology; Order Comments:sla 7/11/2017 at 1pm -ba; Due:75Kas5998; Last Updated By:Lucy Neff; 6/14/2017 1:32:42 PM;Ordered;  For:Health Maintenance, Occipital headache, Presence of lumboperitoneal shunt, Pseudotumor cerebri, Right rotator cuff tendinitis; Ordered By:Bharat Baldwin;    Discussion/Summary    Very pleasant 57-year-old female, returns for two-week postoperative follow-up, status post  shunt revision  Patient overall very pleased with surgical outcome and offers no complaints at this time  Wound care was reviewed with the patient, specifically she is to observe for redness, swelling, increased pain, drainage or fever, should these be observed she understands she is too call and/or return immediately for reassessment  Additionally patient understands she may shower, wash with soap and water, pat wound dry, she also understands she is to avoid immersion in water such as swimming, hot tub use or tub bath  May resume immersion in water in approximately 4 weeks, she also understands she is to avoid hair dyes or other chemicals applied by a beautician to her scalp  Activity levels were also reviewed with the patient in detail, she is to lift no greater than 10 pounds she is advised she may bend occasionally, ambulation is encouraged as tolerated  Patient understands she has a follow-up in approximately 4 weeks with Dr Brielle Arriola, with CT head without contrast 2-3 days prior to visit  She also understands should there be any significant change in her neurologic status; she is to call and/or return immediately for reassessment      These findings, impressions and recommendations are reviewed in great detail with the patient, she expressed understanding and agreement, her questions were answered completely and to her satisfaction  Follow up has been scheduled, as well as CT head         Chief Complaint  Postoperative follow-up 2 weeks, status post  shunt revision      Post-Op  Post-Op Crani-Brain:   Suzie NICK is status post shunt procedure and shunt revision  performed on 05/31/2017  IMAGE GUIDED CORONAL PLACEMENT OF PROGRAMABLE VENTRICULAR-PERITONEAL SHUNT, REMOVAL OF LP SHUNT (Right)        History of Present Illness: The patient reports no dizziness, no speech disturbances, no neck stiffness, no fever, no vertigo, no facial weakness, no cognitive difficulties, no wound drainage, no upper extremity weakness, no lower extremity weakness, no photophobia and no cognition difficulties    The patient presents with complaints of headache (pain at incision sites)  The patient presents with complaints of visual complaints, described as blurry vision  The patient presents with complaints of ataxia, described as imbalance with walking  Physical Examination:   Vitals: within normal limits  Surgical incision site is clean, dry and intact (the incision site had intact sutures and had no drainage )  The surrounding skin findings included normal appearance  (Healing nicely)  Evaluation of the surgical site demonstrates no warmth, no erythema, no swelling, no induration, no ecchymosis and no tenderness  The patient is noted to be awake, alert and oriented  The patient's speech is normal    Coordination demonstrates normal function, including finger to nose, heal to shin, and rapid alternating movements   normal upper extremity and lower extremity motor strength bilaterally  Sensory exam: sensory function intact unless otherwise noted  no apparent limitations in mobility     Test Results:   Assessment:   Post-op, the patient is doing well, with good pain control and without signs of an infection  Plan:   Patient instructed to follow up in 4 weeks  To Do For Next Visit: clinical recheck and review diagnostic test results   CT head without contrast       Review of Systems    Constitutional: No fever, no chills, feels well, no tiredness, no recent weight gain or weight loss    The patient presents with complaints of visual disturbances, described as blurry vision  ENT: right ears needs to be cleared/popped every once in a while  There is some burning sensation  Cardiovascular: No complaints of slow heart rate, no fast heart rate, no chest pain, no palpitations, no leg claudication, no lower extremity edema  Respiratory: No complaints of shortness of breath, no wheezing, no cough, no SOB on exertion, no orthopnea, no PND  Gastrointestinal: constipation  Genitourinary: No complaints of dysuria, no incontinence, no pelvic pain, no dysmenorrhea, no vaginal discharge or bleeding  Integumentary: skin wound and incisions; pain at sites (head)  Neurological: No complaints of headache, no confusion, no convulsions, no numbness, no dizziness or fainting, no tingling, no limb weakness, no difficulty walking  Psychiatric: Not suicidal, no sleep disturbance, no anxiety or depression, no change in personality, no emotional problems  Endocrine: No complaints of proptosis, no hot flashes, no muscle weakness, no deepening of the voice, no feelings of weakness  Hematologic/Lymphatic: No complaints of swollen glands, no swollen glands in the neck, does not bleed easily, does not bruise easily  ROS reviewed  Active Problems    1  Occipital headache (784 0) (R51)   2  Presence of lumboperitoneal shunt (V45 2) (Z98 2)   3  Pseudotumor cerebri (348 2) (G93 2)   4  Right rotator cuff tendinitis (726 10) (M75 81)   5   Subacromial bursitis, right (726 19) (M75 51)    Social History    · Employed   · Former smoker (U68 92) (Y83 503)   · Has 2 children   · High school or GED   · Non drinker / no alcohol use   · Sexually active   · Single    Current Meds   1  Biotin 1 MG Oral Capsule; 4 gummies daily; Therapy: 43FTG7913 to Recorded   2  Calcium 500-100 MG-UNIT Oral Tablet Chewable; 6 TABLETS DAILY; Therapy: 44LGO6915 to Recorded   3  Fiber Select Gummies Oral Tablet Chewable; 2 gummies daily; Therapy: 48TIU9397 to Recorded   4  Vitamin B-12 1000 MCG Oral Tablet; 1 tablet daily; Therapy: 67WAW4607 to Recorded   5  Vitamin D3 2000 UNIT Oral Capsule; 2 CAPSULES DAILY; Therapy: 08ZIX3945 to Recorded    Allergies    1  Benadryl   2  Phenergan    Vitals   Recorded: [id number] 12:40PM   Temperature 97 9 F   Heart Rate 73   Respiration 16   Systolic 086   Diastolic 54   Height 5 ft 3 in   Weight 144 lb 12 8 oz   BMI Calculated 25 65   BSA Calculated 1 69     Physical Exam     Respiratory Respiratory effort: Normal   Auscultation of lungs: Clear to auscultation bilaterally  Cardiovascular Auscultation of heart: Rate is regular  Rhythm is regular  No heart murmur appreciated  Future Appointments    Date/Time Provider Specialty Site   09/20/2017 10:30 AM Tyler Aburto [facility] Neurology [facility]  [facility]   07/13/2017 03:15 PM Romana Boozer, M D   Neurosurgery [facility]     Signatures   Electronically signed by : Brad Garcia [facility]; Jun 14 2017  1:26PM EST                       (Author)    Electronically signed by : FRANCOIS Mena ; Jun 27 2017  2:32PM EST                       (Author)

## 2018-01-17 ENCOUNTER — GENERIC CONVERSION - ENCOUNTER (OUTPATIENT)
Dept: INTERNAL MEDICINE CLINIC | Facility: CLINIC | Age: 43
End: 2018-01-17

## 2018-01-17 ENCOUNTER — GENERIC CONVERSION - ENCOUNTER (OUTPATIENT)
Dept: OTHER | Facility: OTHER | Age: 43
End: 2018-01-17

## 2018-01-18 NOTE — MISCELLANEOUS
Message   Recorded as Task   Date: 06/05/2017 01:22 PM, Created By: Ghanshyam Aguila   Task Name: Call Back   Assigned To: Ghanshyam Aguila   Regarding Patient: Renate Adler, Status: Active   Comment:    Marian Medina - 05 Jun 2017 1:22 PM     TASK CREATED  Spoke with pt to review post-op instructions  Overall, she is recovering, but reports today she feels worse than any other day  She reports her head hurts, as well as the R side of her face, and eye  She does c/o nausea without vomiting, and is taking Zofran for this  This was present prior to d/c  She denies taking any medication during the day because of her children, but takes at [facility] so she can relax and sleep well  Reviewed post-op visit dates  She was encouraged to call with any future concerns or questions          Signatures   Electronically signed by : Gianfranco Apodaca, ; Jun 5 2017  1:23PM EST                       (Author)

## 2018-01-22 ENCOUNTER — GENERIC CONVERSION - ENCOUNTER (OUTPATIENT)
Dept: OTHER | Facility: OTHER | Age: 43
End: 2018-01-22

## 2018-01-22 VITALS
HEIGHT: 63 IN | HEART RATE: 73 BPM | DIASTOLIC BLOOD PRESSURE: 54 MMHG | RESPIRATION RATE: 16 BRPM | SYSTOLIC BLOOD PRESSURE: 109 MMHG | WEIGHT: 144.8 LBS | TEMPERATURE: 97.9 F | BODY MASS INDEX: 25.66 KG/M2

## 2018-01-22 VITALS
HEART RATE: 78 BPM | DIASTOLIC BLOOD PRESSURE: 72 MMHG | SYSTOLIC BLOOD PRESSURE: 114 MMHG | WEIGHT: 158.56 LBS | BODY MASS INDEX: 27.22 KG/M2 | OXYGEN SATURATION: 99 %

## 2018-01-22 VITALS
WEIGHT: 164.25 LBS | HEIGHT: 63 IN | HEART RATE: 63 BPM | RESPIRATION RATE: 16 BRPM | TEMPERATURE: 98.7 F | BODY MASS INDEX: 29.1 KG/M2 | DIASTOLIC BLOOD PRESSURE: 71 MMHG | SYSTOLIC BLOOD PRESSURE: 121 MMHG

## 2018-01-24 ENCOUNTER — ANESTHESIA (OUTPATIENT)
Dept: GASTROENTEROLOGY | Facility: HOSPITAL | Age: 43
DRG: 711 | End: 2018-01-24
Payer: COMMERCIAL

## 2018-01-24 ENCOUNTER — ANESTHESIA EVENT (OUTPATIENT)
Dept: RADIOLOGY | Facility: HOSPITAL | Age: 43
End: 2018-01-24

## 2018-01-24 ENCOUNTER — APPOINTMENT (INPATIENT)
Dept: RADIOLOGY | Facility: HOSPITAL | Age: 43
DRG: 711 | End: 2018-01-24
Payer: COMMERCIAL

## 2018-01-24 ENCOUNTER — TELEPHONE (OUTPATIENT)
Dept: NEUROSURGERY | Facility: CLINIC | Age: 43
End: 2018-01-24

## 2018-01-24 ENCOUNTER — HOSPITAL ENCOUNTER (OUTPATIENT)
Facility: HOSPITAL | Age: 43
Setting detail: OUTPATIENT SURGERY
Discharge: HOME | DRG: 711 | End: 2018-01-24
Attending: INTERNAL MEDICINE | Admitting: INTERNAL MEDICINE
Payer: COMMERCIAL

## 2018-01-24 ENCOUNTER — ANESTHESIA (OUTPATIENT)
Dept: RADIOLOGY | Facility: HOSPITAL | Age: 43
End: 2018-01-24

## 2018-01-24 ENCOUNTER — TRANSCRIBE ORDERS (OUTPATIENT)
Dept: GASTROENTEROLOGY | Facility: HOSPITAL | Age: 43
End: 2018-01-24

## 2018-01-24 ENCOUNTER — ANESTHESIA EVENT (OUTPATIENT)
Dept: GASTROENTEROLOGY | Facility: HOSPITAL | Age: 43
DRG: 711 | End: 2018-01-24
Payer: COMMERCIAL

## 2018-01-24 ENCOUNTER — HOSPITAL ENCOUNTER (INPATIENT)
Facility: HOSPITAL | Age: 43
LOS: 44 days | DRG: 711 | End: 2018-03-09
Attending: SURGERY | Admitting: SURGERY
Payer: COMMERCIAL

## 2018-01-24 ENCOUNTER — APPOINTMENT (OUTPATIENT)
Dept: RADIOLOGY | Facility: HOSPITAL | Age: 43
DRG: 711 | End: 2018-01-24
Attending: INTERNAL MEDICINE
Payer: COMMERCIAL

## 2018-01-24 VITALS
DIASTOLIC BLOOD PRESSURE: 74 MMHG | WEIGHT: 135 LBS | OXYGEN SATURATION: 100 % | HEART RATE: 100 BPM | HEIGHT: 64 IN | BODY MASS INDEX: 23.05 KG/M2 | RESPIRATION RATE: 14 BRPM | SYSTOLIC BLOOD PRESSURE: 138 MMHG | TEMPERATURE: 98.1 F

## 2018-01-24 DIAGNOSIS — G93.2 IDIOPATHIC INTRACRANIAL HYPERTENSION: ICD-10-CM

## 2018-01-24 DIAGNOSIS — K91.89 GASTRIC LEAK: Primary | ICD-10-CM

## 2018-01-24 DIAGNOSIS — K91.89 ESOPHAGEAL ANASTOMOTIC LEAK: ICD-10-CM

## 2018-01-24 DIAGNOSIS — K65.0 ACUTE PERITONITIS (HCC): ICD-10-CM

## 2018-01-24 DIAGNOSIS — F32.0 MILD SINGLE CURRENT EPISODE OF MAJOR DEPRESSIVE DISORDER (HCC): ICD-10-CM

## 2018-01-24 DIAGNOSIS — E44.0 MODERATE PROTEIN-CALORIE MALNUTRITION (HCC): ICD-10-CM

## 2018-01-24 DIAGNOSIS — Z98.2 VP (VENTRICULOPERITONEAL) SHUNT STATUS: ICD-10-CM

## 2018-01-24 DIAGNOSIS — Z98.2 S/P VP SHUNT: ICD-10-CM

## 2018-01-24 DIAGNOSIS — K65.9 PERITONITIS (HCC): ICD-10-CM

## 2018-01-24 LAB
ABO GROUP BLD: NORMAL
ADDITIONAL SETTING: 5
ALBUMIN SERPL BCP-MCNC: 1.3 G/DL (ref 3.5–5)
ALP SERPL-CCNC: 177 U/L (ref 46–116)
ALT SERPL W P-5'-P-CCNC: 61 U/L (ref 12–78)
ANCILLARY VALUES: ABNORMAL
ANION GAP SERPL CALCULATED.3IONS-SCNC: 4 MMOL/L (ref 4–13)
ANISOCYTOSIS BLD QL SMEAR: PRESENT
APTT PPP: 28 SECONDS (ref 23–35)
ARTERIAL PATENCY WRIST A: ABNORMAL
AST SERPL W P-5'-P-CCNC: 28 U/L (ref 5–45)
BASE EXCESS BLDA CALC-SCNC: 4 MMOL/L (ref -2–3)
BASOPHILS # BLD MANUAL: 0 THOUSAND/UL (ref 0–0.1)
BASOPHILS NFR MAR MANUAL: 0 % (ref 0–1)
BILIRUB SERPL-MCNC: 0.91 MG/DL (ref 0.2–1)
BLD GP AB SCN SERPL QL: NEGATIVE
BUN SERPL-MCNC: 21 MG/DL (ref 5–25)
CA-I BLD-SCNC: 1.55 MMOL/L (ref 1.12–1.32)
CALCIUM SERPL-MCNC: 10.2 MG/DL (ref 8.3–10.1)
CHLORIDE SERPL-SCNC: 102 MMOL/L (ref 100–108)
CO2 SERPL-SCNC: 31 MMOL/L (ref 21–32)
CREAT SERPL-MCNC: 0.37 MG/DL (ref 0.6–1.3)
DS:DELIVERY SYSTEM: AC
EOSINOPHIL # BLD MANUAL: 0 THOUSAND/UL (ref 0–0.4)
EOSINOPHIL NFR BLD MANUAL: 0 % (ref 0–6)
ERYTHROCYTE [DISTWIDTH] IN BLOOD BY AUTOMATED COUNT: 15 % (ref 11.6–15.1)
FIO2 GAS DIL.REBREATH: 60 L
GFR SERPL CREATININE-BSD FRML MDRD: 131 ML/MIN/1.73SQ M
GLUCOSE SERPL-MCNC: 109 MG/DL (ref 65–140)
GLUCOSE SERPL-MCNC: 188 MG/DL (ref 65–140)
GLUCOSE SERPL-MCNC: 225 MG/DL (ref 65–140)
HCO3 BLDA-SCNC: 29.7 MMOL/L (ref 22–28)
HCT VFR BLD AUTO: 37.8 % (ref 34.8–46.1)
HCT VFR BLD CALC: 33 % (ref 34.8–46.1)
HGB BLD-MCNC: 10.3 G/DL (ref 11.5–15.4)
HGB BLDA-MCNC: 11.2 G/DL (ref 11.5–15.4)
INR PPP: 1.26 (ref 0.86–1.16)
LACTATE SERPL-SCNC: 1.5 MMOL/L (ref 0.5–2)
LYMPHOCYTES # BLD AUTO: 0 % (ref 14–44)
LYMPHOCYTES # BLD AUTO: 0 THOUSAND/UL (ref 0.6–4.47)
MAGNESIUM SERPL-MCNC: 2.1 MG/DL (ref 1.6–2.6)
MCH RBC QN AUTO: 30.3 PG (ref 26.8–34.3)
MCHC RBC AUTO-ENTMCNC: 27.2 G/DL (ref 31.4–37.4)
MCV RBC AUTO: 111 FL (ref 82–98)
METAMYELOCYTES NFR BLD MANUAL: 1 % (ref 0–1)
MONOCYTES # BLD AUTO: 0.77 THOUSAND/UL (ref 0–1.22)
MONOCYTES NFR BLD: 5 % (ref 4–12)
MYELOCYTES NFR BLD MANUAL: 1 % (ref 0–1)
NEUTROPHILS # BLD MANUAL: 14.34 THOUSAND/UL (ref 1.85–7.62)
NEUTS BAND NFR BLD MANUAL: 2 % (ref 0–8)
NEUTS SEG NFR BLD AUTO: 91 % (ref 43–75)
NRBC BLD AUTO-RTO: 0 /100 WBCS
PCO2 BLD: 110 MM HG
PCO2 BLD: 31 MMOL/L (ref 21–32)
PCO2 BLD: 54 MM HG (ref 36–44)
PCO2 BLDA: 53.4 MM HG
PH BLD: 7.35 [PH]
PH BLD: 7.35 [PH] (ref 7.35–7.45)
PLATELET # BLD AUTO: 507 THOUSANDS/UL (ref 149–390)
PLATELET BLD QL SMEAR: ABNORMAL
PMV BLD AUTO: 10.5 FL (ref 8.9–12.7)
PO2 BLD: 112 MM HG (ref 75–129)
POTASSIUM BLD-SCNC: 4.7 MMOL/L (ref 3.5–5.3)
POTASSIUM SERPL-SCNC: 4.9 MMOL/L (ref 3.5–5.3)
PRESSURE SETTING: 0
PROT SERPL-MCNC: 5.3 G/DL (ref 6.4–8.2)
PROTHROMBIN TIME: 15.9 SECONDS (ref 12.1–14.4)
RBC # BLD AUTO: 3.4 MILLION/UL (ref 3.81–5.12)
RBC MORPH BLD: PRESENT
RESPIRATORY RATE: 14
RH BLD: NEGATIVE
SAO2 % BLD FROM PO2: 98 % (ref 95–98)
SODIUM BLD-SCNC: 136 MMOL/L (ref 136–145)
SODIUM SERPL-SCNC: 137 MMOL/L (ref 136–145)
SPECIMEN EXPIRATION DATE: NORMAL
SPECIMEN SOURCE: ABNORMAL
VENT TYPE: 2
VENTILATION VALUE: 18
WBC # BLD AUTO: 15.42 THOUSAND/UL (ref 4.31–10.16)

## 2018-01-24 PROCEDURE — 0D758DZ DILATION OF ESOPHAGUS WITH INTRALUMINAL DEVICE, VIA NATURAL OR ARTIFICIAL OPENING ENDOSCOPIC: ICD-10-PCS | Performed by: INTERNAL MEDICINE

## 2018-01-24 PROCEDURE — 86901 BLOOD TYPING SEROLOGIC RH(D): CPT | Performed by: EMERGENCY MEDICINE

## 2018-01-24 PROCEDURE — 86900 BLOOD TYPING SEROLOGIC ABO: CPT | Performed by: EMERGENCY MEDICINE

## 2018-01-24 PROCEDURE — 3E0336Z INTRODUCTION OF NUTRITIONAL SUBSTANCE INTO PERIPHERAL VEIN, PERCUTANEOUS APPROACH: ICD-10-PCS | Performed by: SURGERY

## 2018-01-24 PROCEDURE — 82330 ASSAY OF CALCIUM: CPT

## 2018-01-24 PROCEDURE — C1874 STENT, COATED/COV W/DEL SYS: HCPCS | Performed by: INTERNAL MEDICINE

## 2018-01-24 PROCEDURE — 99024 POSTOP FOLLOW-UP VISIT: CPT | Performed by: NEUROLOGICAL SURGERY

## 2018-01-24 PROCEDURE — 85730 THROMBOPLASTIN TIME PARTIAL: CPT | Performed by: EMERGENCY MEDICINE

## 2018-01-24 PROCEDURE — 82948 REAGENT STRIP/BLOOD GLUCOSE: CPT

## 2018-01-24 PROCEDURE — 82803 BLOOD GASES ANY COMBINATION: CPT

## 2018-01-24 PROCEDURE — 71045 X-RAY EXAM CHEST 1 VIEW: CPT

## 2018-01-24 PROCEDURE — 83605 ASSAY OF LACTIC ACID: CPT | Performed by: EMERGENCY MEDICINE

## 2018-01-24 PROCEDURE — 85027 COMPLETE CBC AUTOMATED: CPT | Performed by: EMERGENCY MEDICINE

## 2018-01-24 PROCEDURE — 0D748DZ DILATION OF ESOPHAGOGASTRIC JUNCTION WITH INTRALUMINAL DEVICE, VIA NATURAL OR ARTIFICIAL OPENING ENDOSCOPIC: ICD-10-PCS | Performed by: INTERNAL MEDICINE

## 2018-01-24 PROCEDURE — 83735 ASSAY OF MAGNESIUM: CPT | Performed by: EMERGENCY MEDICINE

## 2018-01-24 PROCEDURE — 84132 ASSAY OF SERUM POTASSIUM: CPT

## 2018-01-24 PROCEDURE — 94760 N-INVAS EAR/PLS OXIMETRY 1: CPT

## 2018-01-24 PROCEDURE — 82947 ASSAY GLUCOSE BLOOD QUANT: CPT

## 2018-01-24 PROCEDURE — 94002 VENT MGMT INPAT INIT DAY: CPT

## 2018-01-24 PROCEDURE — 84295 ASSAY OF SERUM SODIUM: CPT

## 2018-01-24 PROCEDURE — 85610 PROTHROMBIN TIME: CPT | Performed by: EMERGENCY MEDICINE

## 2018-01-24 PROCEDURE — C1769 GUIDE WIRE: HCPCS | Performed by: INTERNAL MEDICINE

## 2018-01-24 PROCEDURE — 76000 FLUOROSCOPY <1 HR PHYS/QHP: CPT

## 2018-01-24 PROCEDURE — 85007 BL SMEAR W/DIFF WBC COUNT: CPT | Performed by: EMERGENCY MEDICINE

## 2018-01-24 PROCEDURE — C9113 INJ PANTOPRAZOLE SODIUM, VIA: HCPCS | Performed by: EMERGENCY MEDICINE

## 2018-01-24 PROCEDURE — 85014 HEMATOCRIT: CPT

## 2018-01-24 PROCEDURE — 86850 RBC ANTIBODY SCREEN: CPT | Performed by: EMERGENCY MEDICINE

## 2018-01-24 PROCEDURE — 80053 COMPREHEN METABOLIC PANEL: CPT | Performed by: EMERGENCY MEDICINE

## 2018-01-24 DEVICE — STENT SYSTEM
Type: IMPLANTABLE DEVICE | Site: ESOPHAGUS | Status: NON-FUNCTIONAL
Brand: WALLFLEX™ ESOPHAGEAL
Removed: 2018-03-28

## 2018-01-24 RX ORDER — PANTOPRAZOLE SODIUM 40 MG/1
40 INJECTION, POWDER, FOR SOLUTION INTRAVENOUS EVERY 12 HOURS SCHEDULED
Status: DISCONTINUED | OUTPATIENT
Start: 2018-01-24 | End: 2018-01-29

## 2018-01-24 RX ORDER — PROPOFOL 10 MG/ML
INJECTION, EMULSION INTRAVENOUS AS NEEDED
Status: DISCONTINUED | OUTPATIENT
Start: 2018-01-24 | End: 2018-01-24 | Stop reason: SURG

## 2018-01-24 RX ORDER — LIDOCAINE HYDROCHLORIDE 10 MG/ML
INJECTION, SOLUTION INFILTRATION; PERINEURAL AS NEEDED
Status: DISCONTINUED | OUTPATIENT
Start: 2018-01-24 | End: 2018-01-24 | Stop reason: SURG

## 2018-01-24 RX ORDER — SUCCINYLCHOLINE CHLORIDE 20 MG/ML
INJECTION INTRAMUSCULAR; INTRAVENOUS AS NEEDED
Status: DISCONTINUED | OUTPATIENT
Start: 2018-01-24 | End: 2018-01-24 | Stop reason: SURG

## 2018-01-24 RX ORDER — CHLORHEXIDINE GLUCONATE 0.12 MG/ML
15 RINSE ORAL EVERY 12 HOURS SCHEDULED
Status: DISCONTINUED | OUTPATIENT
Start: 2018-01-24 | End: 2018-01-26

## 2018-01-24 RX ORDER — SODIUM CHLORIDE, SODIUM GLUCONATE, SODIUM ACETATE, POTASSIUM CHLORIDE, MAGNESIUM CHLORIDE, SODIUM PHOSPHATE, DIBASIC, AND POTASSIUM PHOSPHATE .53; .5; .37; .037; .03; .012; .00082 G/100ML; G/100ML; G/100ML; G/100ML; G/100ML; G/100ML; G/100ML
100 INJECTION, SOLUTION INTRAVENOUS CONTINUOUS
Status: DISCONTINUED | OUTPATIENT
Start: 2018-01-24 | End: 2018-01-29

## 2018-01-24 RX ORDER — METOCLOPRAMIDE HYDROCHLORIDE 5 MG/ML
10 INJECTION INTRAMUSCULAR; INTRAVENOUS ONCE AS NEEDED
Status: DISCONTINUED | OUTPATIENT
Start: 2018-01-24 | End: 2018-01-30 | Stop reason: HOSPADM

## 2018-01-24 RX ORDER — HEPARIN SODIUM 5000 [USP'U]/ML
5000 INJECTION, SOLUTION INTRAVENOUS; SUBCUTANEOUS EVERY 8 HOURS SCHEDULED
Status: COMPLETED | OUTPATIENT
Start: 2018-01-24 | End: 2018-01-24

## 2018-01-24 RX ORDER — ONDANSETRON 2 MG/ML
4 INJECTION INTRAMUSCULAR; INTRAVENOUS ONCE AS NEEDED
Status: DISCONTINUED | OUTPATIENT
Start: 2018-01-24 | End: 2018-01-30 | Stop reason: HOSPADM

## 2018-01-24 RX ORDER — SODIUM CHLORIDE 9 MG/ML
INJECTION, SOLUTION INTRAVENOUS CONTINUOUS PRN
Status: DISCONTINUED | OUTPATIENT
Start: 2018-01-24 | End: 2018-01-24 | Stop reason: SURG

## 2018-01-24 RX ORDER — VANCOMYCIN HYDROCHLORIDE 1 G/200ML
15 INJECTION, SOLUTION INTRAVENOUS EVERY 12 HOURS
Status: DISCONTINUED | OUTPATIENT
Start: 2018-01-24 | End: 2018-01-26

## 2018-01-24 RX ADMIN — MICAFUNGIN SODIUM 100 MG: 10 INJECTION, POWDER, LYOPHILIZED, FOR SOLUTION INTRAVENOUS at 23:35

## 2018-01-24 RX ADMIN — SODIUM CHLORIDE, SODIUM GLUCONATE, SODIUM ACETATE, POTASSIUM CHLORIDE AND MAGNESIUM CHLORIDE 100 ML/HR: 526; 502; 368; 37; 30 INJECTION, SOLUTION INTRAVENOUS at 19:15

## 2018-01-24 RX ADMIN — HYDROMORPHONE HYDROCHLORIDE 1 MG: 1 INJECTION, SOLUTION INTRAMUSCULAR; INTRAVENOUS; SUBCUTANEOUS at 20:16

## 2018-01-24 RX ADMIN — HYDROMORPHONE HYDROCHLORIDE 1 MG: 1 INJECTION, SOLUTION INTRAMUSCULAR; INTRAVENOUS; SUBCUTANEOUS at 22:31

## 2018-01-24 RX ADMIN — PROPOFOL 150 MG: 10 INJECTION, EMULSION INTRAVENOUS at 15:38

## 2018-01-24 RX ADMIN — LIDOCAINE HYDROCHLORIDE 50 MG: 10 INJECTION, SOLUTION INFILTRATION; PERINEURAL at 15:38

## 2018-01-24 RX ADMIN — PANTOPRAZOLE SODIUM 40 MG: 40 INJECTION, POWDER, FOR SOLUTION INTRAVENOUS at 20:20

## 2018-01-24 RX ADMIN — HEPARIN SODIUM 5000 UNITS: 5000 INJECTION, SOLUTION INTRAVENOUS; SUBCUTANEOUS at 23:59

## 2018-01-24 RX ADMIN — SODIUM CHLORIDE: 0.9 INJECTION, SOLUTION INTRAVENOUS at 15:35

## 2018-01-24 RX ADMIN — SUCCINYLCHOLINE CHLORIDE 120 MG: 20 INJECTION, SOLUTION INTRAMUSCULAR; INTRAVENOUS at 15:38

## 2018-01-24 NOTE — ANESTHESIA PREPROCEDURE EVALUATION
Review of Systems/Medical History  Patient summary reviewed        Cardiovascular  Negative cardio ROS    Pulmonary  Negative pulmonary ROS        GI/Hepatic    Bariatric surgery,   Comment: Perforation s/p gastric bypass and hernia repair       Negative  ROS        Endo/Other  Negative endo/other ROS      GYN  Negative gynecology ROS          Hematology  Negative hematology ROS      Musculoskeletal  Negative musculoskeletal ROS        Neurology    Headaches,   Comment: Pseudotumor cerebri s/p shunt Psychology   Negative psychology ROS              Physical Exam    Airway    Mallampati score: II  TM Distance: >3 FB  Neck ROM: full     Dental       Cardiovascular  Comment: Negative ROS, Cardiovascular exam normal    Pulmonary  Pulmonary exam normal     Other Findings        Anesthesia Plan  ASA Score- 3 Emergent    Anesthesia Type- general with ASA Monitors  Additional Monitors:   Airway Plan: ETT  Plan Factors-    Induction- intravenous  Postoperative Plan-     Informed Consent- Anesthetic plan and risks discussed with patient  I personally reviewed this patient with the CRNA  Discussed and agreed on the Anesthesia Plan with the CRNA  Shai Weiss

## 2018-01-24 NOTE — PERIOPERATIVE NURSING NOTE
09% sodium chloride infusing from gi lab  Tpn at 83ml/hr infusing from gi lab  Will continue to monitor

## 2018-01-24 NOTE — OP NOTE
OPERATIVE REPORT  PATIENT NAME: Gigi Arnold    :  1975  MRN: [id number]  Pt Location: [facility] GI ROOM 04    SURGERY DATE: 2018    Surgeon(s) and Role:     * Diana Moise MD - Primary    Preop Diagnosis:  Postoperative Leakage/perforation at the gastroesophageal junction  Upper GI series demonstrated extravasation of contrast immediately below the gastroesophageal junction  Surgery service requested deployment of a fully covered metal stent across the gastroesophageal junction to seal the leakage site  Post-Op Diagnosis Codes:   Postoperative Leakage/perforation at the gastroesophageal junction    Procedure(s) (LRB):  INSERTION STENT ESOPHAGEAL (N/A)    Specimen(s):  * No specimens in log *    Estimated Blood Loss:   Minimal    Drains:       Anesthesia Type:   General anesthesia per anesthesia service    Operative Indications:  Postoperative Leakage/perforation at the gastroesophageal junction  Upper GI series demonstrated extravasation of contrast immediately below the gastroesophageal junction  Surgery service requested deployment of a fully covered metal stent across the gastroesophageal junction to seal the leakage site  Operative Findings:    Instrument: Olympus gastroscope  Medications: MAC per Anesthesia Service    UPPER ENDOSCOPY Procedure: Pre-procedure eval including cardiopulmonary exam was benign  The pt was felt to be stable for sedation and endoscopy  Consent was obtained  The pt was monitored with automatic BP, pOx and close clinical observation  UPPER ENDOSCOPY Findings: The esophagus was intubated and evaluated throughout its entire length to the GEJ at 39 cm  The Z-line and hiatus were also at 38 cm  There was no Freeman's esophagus, esophagitis or hiatal hernia  The gastric anatomy was consistent with sleeve gastrectomy  Multiple small ulcerations and erosions were seen in the stomach   The site of perforation below the gastroesophageal junction was not endoscopically visible  The pylorus, duodenal bulb and sweep were well evaluated and unremarkable  The gastroesophageal junction was marked under fluoroscopic view  A [employer] hemoclip was deployed at the gastroesophageal junction  Also a small metal marker was taped on the anterior chest to nimo the GJ while having the tip of the endoscope at the GEJ  A 0 035 dreamwire was introduced in the small intestine  The endoscope was withdrawn  Under fluoroscopic view a fully covered wallflex metal stent size 155 mm/23 mm was deployed across the gastroesophageal junction  Its upper extent was at 32 cm from the incisors  Its distal extent was at 47 5 cm from the incisors  There appeared to be 7 cm of stent above the gastroesophageal junction and 8 5 cm of stent below the gastroesophageal junction  Fluoroscopic images of the deployed stent were obtained  Also the stent was endoscopically examined from its distal to its proximal end and appeared to be nicely deployed and snug within the lumen at its distal end in the middle of the sleeve and also at its proximal end in the mid-esophagus  2 hemoclips were used to attach the upper end of the stent to the esophageal mucosa and help prevent migration  The pt tolerated the procedure well without undue dicomfort  She was transferred to the PACU but did not meet parameters for extubation  Post-Endoscopic Dx:     1  Postoperative perforation leakage at the gastroesophageal junction status post deployment of 23/155 mm fully covered wallflex metal stent  The stent appears to be in excellent position  2   Several tiny ulcerations and erosions in the stomach  3   Anatomy consistent with sleeve gastrectomy        Recommendations:   1  Transfer to surgical ICU  Patient will be taken care of by Surgical Critical Care  Discussed case with Dr Suhail Givens, surgical Critical Care and Dr Nicole Pugh, surgery      Complications:   None    Procedure and Technique: Patient Disposition:      SIGNATURE: Edita Pfeiffer MD  DATE: January 24, 2018  TIME: 4:23 PM

## 2018-01-24 NOTE — OP NOTE
* GI/ENDOSCOPY BRIEF REPORT *     PATIENT NAME: AIDEE NICK - VISIT ID:  Patient ID: [id number]   YOB: 1975     INTRODUCTION: Esophagogastroduodenoscopy - A 36 female patient presents   for an outpatient Esophagogastroduodenoscopy at [facility]  INDICATIONS:GEJ leakage in pt s/p sleeve gastrectomy     CONSENT: The benefits, risks, and alternatives to the procedure were   discussed and informed consent was obtained from the patient  PREPARATION:  EKG, pulse, pulse oximetry and blood pressure were monitored   throughout the procedure  MEDICATIONS:     PROCEDURE:  The endoscope was passed without difficulty through the mouth   under direct visualization and advanced to the 2nd portion of the   duodenum  The scope was withdrawn and the mucosa was carefully examined  FINDINGS: 23mm/155mm fully covered W[employer]lex metal stent deployed across the   GEJ     COMPLICATIONS: There were no complications  IMPRESSIONS:23mm/155mm fully covered Walflex metal stent deployed across   the GEJ     RECOMMENDATIONS:f/u with surgery     ESTIMATED BLOOD LOSS:     PATHOLOGY SPECIMENS:     PROCEDURE CODES: 62878 EGDs flexible; w/ dilation & stent placement,   guidewire placement     ICD-9 Codes:     ICD-10 Codes:     PERFORMED BY: FRANCOIS Murillo  on 01/24/2018  Version 1, electronically signed by FRANCOIS Murillo  on   01/24/2018 at 16:20    SEE FINAL OP NOTE

## 2018-01-24 NOTE — TELEPHONE ENCOUNTER
Spoke with daughter Edwin Beavers regarding an old task from [employer]  I was checking on the status of her mother who is currently hospitalized at [facility] and seeing if the neurosurgeon (dr Jamey Smith) still needed to get in touch with [facility]  She said that she "believes they have already spoken" and that everything has been taken care of as of now  I informed her to call the office with any further questions or concerns and she was appreciative

## 2018-01-24 NOTE — ANESTHESIA POSTPROCEDURE EVALUATION
Post-Op Assessment Note      CV Status:  Stable    Mental Status:  Alert and awake    Hydration Status:  Euvolemic    PONV Controlled:  Controlled    Airway Patency:  Patent    Post Op Vitals Reviewed: Yes          Staff: CRNA           BP   113/69   Temp   98 1   Pulse  113   Resp   16   SpO2   99%

## 2018-01-25 ENCOUNTER — APPOINTMENT (INPATIENT)
Dept: RADIOLOGY | Facility: HOSPITAL | Age: 43
DRG: 711 | End: 2018-01-25
Payer: COMMERCIAL

## 2018-01-25 ENCOUNTER — ANESTHESIA (OUTPATIENT)
Dept: ICU | Facility: HOSPITAL | Age: 43
End: 2018-01-25

## 2018-01-25 ENCOUNTER — ANESTHESIA EVENT (OUTPATIENT)
Dept: ICU | Facility: HOSPITAL | Age: 43
End: 2018-01-25

## 2018-01-25 ENCOUNTER — ANESTHESIA (INPATIENT)
Dept: PERIOP | Facility: HOSPITAL | Age: 43
DRG: 711 | End: 2018-01-25
Payer: COMMERCIAL

## 2018-01-25 ENCOUNTER — APPOINTMENT (INPATIENT)
Dept: RADIOLOGY | Facility: HOSPITAL | Age: 43
DRG: 711 | End: 2018-01-25
Attending: NEUROLOGICAL SURGERY
Payer: COMMERCIAL

## 2018-01-25 ENCOUNTER — ANESTHESIA EVENT (INPATIENT)
Dept: PERIOP | Facility: HOSPITAL | Age: 43
DRG: 711 | End: 2018-01-25
Payer: COMMERCIAL

## 2018-01-25 PROBLEM — K65.9 PERITONITIS (HCC): Status: ACTIVE | Noted: 2018-01-24

## 2018-01-25 PROBLEM — Z98.2 VP (VENTRICULOPERITONEAL) SHUNT STATUS: Status: ACTIVE | Noted: 2018-01-24

## 2018-01-25 LAB
ABO GROUP BLD: NORMAL
ANION GAP SERPL CALCULATED.3IONS-SCNC: 4 MMOL/L (ref 4–13)
ANION GAP SERPL CALCULATED.3IONS-SCNC: 6 MMOL/L (ref 4–13)
ANISOCYTOSIS BLD QL SMEAR: PRESENT
APPEARANCE CSF: CLEAR
BASE EXCESS BLDA CALC-SCNC: 1.6 MMOL/L
BASE EXCESS BLDA CALC-SCNC: 2.5 MMOL/L
BASOPHILS # BLD MANUAL: 0 THOUSAND/UL (ref 0–0.1)
BASOPHILS # BLD MANUAL: 0.15 THOUSAND/UL (ref 0–0.1)
BASOPHILS NFR MAR MANUAL: 0 % (ref 0–1)
BASOPHILS NFR MAR MANUAL: 1 % (ref 0–1)
BLD GP AB SCN SERPL QL: NEGATIVE
BODY TEMPERATURE: 97.8 DEGREES FEHRENHEIT
BODY TEMPERATURE: 97.9 DEGREES FEHRENHEIT
BUN SERPL-MCNC: 21 MG/DL (ref 5–25)
BUN SERPL-MCNC: 22 MG/DL (ref 5–25)
CALCIUM SERPL-MCNC: 10 MG/DL (ref 8.3–10.1)
CALCIUM SERPL-MCNC: 10.3 MG/DL (ref 8.3–10.1)
CHLORIDE SERPL-SCNC: 102 MMOL/L (ref 100–108)
CHLORIDE SERPL-SCNC: 105 MMOL/L (ref 100–108)
CO2 SERPL-SCNC: 28 MMOL/L (ref 21–32)
CO2 SERPL-SCNC: 32 MMOL/L (ref 21–32)
CREAT SERPL-MCNC: 0.43 MG/DL (ref 0.6–1.3)
CREAT SERPL-MCNC: 0.44 MG/DL (ref 0.6–1.3)
EOSINOPHIL # BLD MANUAL: 0 THOUSAND/UL (ref 0–0.4)
EOSINOPHIL # BLD MANUAL: 0.15 THOUSAND/UL (ref 0–0.4)
EOSINOPHIL NFR BLD MANUAL: 0 % (ref 0–6)
EOSINOPHIL NFR BLD MANUAL: 1 % (ref 0–6)
ERYTHROCYTE [DISTWIDTH] IN BLOOD BY AUTOMATED COUNT: 14.2 % (ref 11.6–15.1)
ERYTHROCYTE [DISTWIDTH] IN BLOOD BY AUTOMATED COUNT: 14.2 % (ref 11.6–15.1)
GFR SERPL CREATININE-BSD FRML MDRD: 124 ML/MIN/1.73SQ M
GFR SERPL CREATININE-BSD FRML MDRD: 125 ML/MIN/1.73SQ M
GIANT PLATELETS BLD QL SMEAR: PRESENT
GLUCOSE CSF-MCNC: 55 MG/DL (ref 50–80)
GLUCOSE SERPL-MCNC: 108 MG/DL (ref 65–140)
GLUCOSE SERPL-MCNC: 110 MG/DL (ref 65–140)
GLUCOSE SERPL-MCNC: 114 MG/DL (ref 65–140)
GRAM STN SPEC: NORMAL
HCO3 BLDA-SCNC: 24.7 MMOL/L (ref 22–28)
HCO3 BLDA-SCNC: 25.2 MMOL/L (ref 22–28)
HCT VFR BLD AUTO: 31.2 % (ref 34.8–46.1)
HCT VFR BLD AUTO: 33.2 % (ref 34.8–46.1)
HGB BLD-MCNC: 10.4 G/DL (ref 11.5–15.4)
HGB BLD-MCNC: 10.7 G/DL (ref 11.5–15.4)
HOROWITZ INDEX BLDA+IHG-RTO: 40 MM[HG]
HOROWITZ INDEX BLDA+IHG-RTO: 40 MM[HG]
LACTATE SERPL-SCNC: 1.3 MMOL/L (ref 0.5–2)
LYMPHOCYTES # BLD AUTO: 0.56 THOUSAND/UL (ref 0.6–4.47)
LYMPHOCYTES # BLD AUTO: 0.73 THOUSAND/UL (ref 0.6–4.47)
LYMPHOCYTES # BLD AUTO: 4 % (ref 14–44)
LYMPHOCYTES # BLD AUTO: 5 % (ref 14–44)
LYMPHOCYTES NFR CSF MANUAL: 23 %
MAGNESIUM SERPL-MCNC: 1.5 MG/DL (ref 1.6–2.6)
MAGNESIUM SERPL-MCNC: 1.7 MG/DL (ref 1.6–2.6)
MCH RBC QN AUTO: 29.4 PG (ref 26.8–34.3)
MCH RBC QN AUTO: 29.9 PG (ref 26.8–34.3)
MCHC RBC AUTO-ENTMCNC: 32.2 G/DL (ref 31.4–37.4)
MCHC RBC AUTO-ENTMCNC: 33.3 G/DL (ref 31.4–37.4)
MCV RBC AUTO: 90 FL (ref 82–98)
MCV RBC AUTO: 91 FL (ref 82–98)
MONOCYTES # BLD AUTO: 0.71 THOUSAND/UL (ref 0–1.22)
MONOCYTES # BLD AUTO: 1.02 THOUSAND/UL (ref 0–1.22)
MONOCYTES NFR BLD: 5 % (ref 4–12)
MONOCYTES NFR BLD: 7 % (ref 4–12)
MONOS+MACROS CSF MANUAL: 77 %
NEUTROPHILS # BLD MANUAL: 12.27 THOUSAND/UL (ref 1.85–7.62)
NEUTROPHILS # BLD MANUAL: 12.69 THOUSAND/UL (ref 1.85–7.62)
NEUTS BAND NFR BLD MANUAL: 1 % (ref 0–8)
NEUTS BAND NFR BLD MANUAL: 7 % (ref 0–8)
NEUTS SEG NFR BLD AUTO: 83 % (ref 43–75)
NEUTS SEG NFR BLD AUTO: 83 % (ref 43–75)
NRBC BLD AUTO-RTO: 0 /100 WBCS
NRBC BLD AUTO-RTO: 0 /100 WBCS
O2 CT BLDA-SCNC: 15.6 ML/DL (ref 16–23)
O2 CT BLDA-SCNC: 15.7 ML/DL (ref 16–23)
OXYHGB MFR BLDA: 98.4 % (ref 94–97)
OXYHGB MFR BLDA: 98.4 % (ref 94–97)
PCO2 BLDA: 32.4 MM HG (ref 36–44)
PCO2 BLDA: 33.6 MM HG (ref 36–44)
PEEP RESPIRATORY: 5 CM[H2O]
PEEP RESPIRATORY: 5 CM[H2O]
PH BLDA: 7.48 [PH] (ref 7.35–7.45)
PH BLDA: 7.51 [PH] (ref 7.35–7.45)
PHOSPHATE SERPL-MCNC: 4.1 MG/DL (ref 2.7–4.5)
PLATELET # BLD AUTO: 572 THOUSANDS/UL (ref 149–390)
PLATELET # BLD AUTO: 589 THOUSANDS/UL (ref 149–390)
PLATELET BLD QL SMEAR: ABNORMAL
PLATELET BLD QL SMEAR: ABNORMAL
PLATELET CLUMP BLD QL SMEAR: PRESENT
PMV BLD AUTO: 9.8 FL (ref 8.9–12.7)
PMV BLD AUTO: 9.9 FL (ref 8.9–12.7)
PO2 BLDA: 165 MM HG (ref 75–129)
PO2 BLDA: 165.1 MM HG (ref 75–129)
POIKILOCYTOSIS BLD QL SMEAR: PRESENT
POTASSIUM SERPL-SCNC: 4.5 MMOL/L (ref 3.5–5.3)
POTASSIUM SERPL-SCNC: 4.9 MMOL/L (ref 3.5–5.3)
PROT CSF-MCNC: 17 MG/DL (ref 15–45)
RBC # BLD AUTO: 3.48 MILLION/UL (ref 3.81–5.12)
RBC # BLD AUTO: 3.64 MILLION/UL (ref 3.81–5.12)
RBC # CSF MANUAL: 9 UL (ref 0–10)
RBC MORPH BLD: PRESENT
RH BLD: NEGATIVE
SODIUM SERPL-SCNC: 138 MMOL/L (ref 136–145)
SODIUM SERPL-SCNC: 139 MMOL/L (ref 136–145)
SPECIMEN EXPIRATION DATE: NORMAL
SPECIMEN SOURCE: ABNORMAL
SPECIMEN SOURCE: ABNORMAL
TOTAL CELLS COUNTED BLD: NO
TOTAL CELLS COUNTED SPEC: 44
TRIGL SERPL-MCNC: 130 MG/DL
VANCOMYCIN TROUGH SERPL-MCNC: 7.9 UG/ML (ref 10–20)
VARIANT LYMPHS # BLD AUTO: 1 %
VARIANT LYMPHS # BLD AUTO: 2 %
VENT AC: 12
VENT AC: 14
VENT- AC: AC
VENT- AC: AC
VT SETTING VENT: 450 ML
VT SETTING VENT: 450 ML
WBC # BLD AUTO: 14.1 THOUSAND/UL (ref 4.31–10.16)
WBC # BLD AUTO: 14.61 THOUSAND/UL (ref 4.31–10.16)
WBC # CSF AUTO: 1 /UL (ref 0–5)

## 2018-01-25 PROCEDURE — 71045 X-RAY EXAM CHEST 1 VIEW: CPT

## 2018-01-25 PROCEDURE — 82948 REAGENT STRIP/BLOOD GLUCOSE: CPT

## 2018-01-25 PROCEDURE — 85027 COMPLETE CBC AUTOMATED: CPT | Performed by: EMERGENCY MEDICINE

## 2018-01-25 PROCEDURE — 84132 ASSAY OF SERUM POTASSIUM: CPT

## 2018-01-25 PROCEDURE — 87106 FUNGI IDENTIFICATION YEAST: CPT | Performed by: NEUROLOGICAL SURGERY

## 2018-01-25 PROCEDURE — 89050 BODY FLUID CELL COUNT: CPT | Performed by: NEUROLOGICAL SURGERY

## 2018-01-25 PROCEDURE — 87252 VIRUS INOCULATION TISSUE: CPT | Performed by: NEUROLOGICAL SURGERY

## 2018-01-25 PROCEDURE — 87070 CULTURE OTHR SPECIMN AEROBIC: CPT | Performed by: NEUROLOGICAL SURGERY

## 2018-01-25 PROCEDURE — 49020 DRAINAGE ABDOM ABSCESS OPEN: CPT | Performed by: SURGERY

## 2018-01-25 PROCEDURE — 99232 SBSQ HOSP IP/OBS MODERATE 35: CPT | Performed by: NEUROLOGICAL SURGERY

## 2018-01-25 PROCEDURE — 74018 RADEX ABDOMEN 1 VIEW: CPT

## 2018-01-25 PROCEDURE — 85007 BL SMEAR W/DIFF WBC COUNT: CPT | Performed by: EMERGENCY MEDICINE

## 2018-01-25 PROCEDURE — 74177 CT ABD & PELVIS W/CONTRAST: CPT

## 2018-01-25 PROCEDURE — 83735 ASSAY OF MAGNESIUM: CPT | Performed by: SURGERY

## 2018-01-25 PROCEDURE — 0W9G3ZX DRAINAGE OF PERITONEAL CAVITY, PERCUTANEOUS APPROACH, DIAGNOSTIC: ICD-10-PCS | Performed by: SURGERY

## 2018-01-25 PROCEDURE — 84100 ASSAY OF PHOSPHORUS: CPT | Performed by: EMERGENCY MEDICINE

## 2018-01-25 PROCEDURE — 85014 HEMATOCRIT: CPT

## 2018-01-25 PROCEDURE — 82945 GLUCOSE OTHER FLUID: CPT | Performed by: NEUROLOGICAL SURGERY

## 2018-01-25 PROCEDURE — 86901 BLOOD TYPING SEROLOGIC RH(D): CPT | Performed by: SURGERY

## 2018-01-25 PROCEDURE — 71260 CT THORAX DX C+: CPT

## 2018-01-25 PROCEDURE — 87186 SC STD MICRODIL/AGAR DIL: CPT | Performed by: NEUROLOGICAL SURGERY

## 2018-01-25 PROCEDURE — C9113 INJ PANTOPRAZOLE SODIUM, VIA: HCPCS | Performed by: EMERGENCY MEDICINE

## 2018-01-25 PROCEDURE — 80048 BASIC METABOLIC PNL TOTAL CA: CPT | Performed by: EMERGENCY MEDICINE

## 2018-01-25 PROCEDURE — 62230 REPLACE/REVISE BRAIN SHUNT: CPT | Performed by: NEUROLOGICAL SURGERY

## 2018-01-25 PROCEDURE — 82803 BLOOD GASES ANY COMBINATION: CPT

## 2018-01-25 PROCEDURE — 70250 X-RAY EXAM OF SKULL: CPT

## 2018-01-25 PROCEDURE — 82805 BLOOD GASES W/O2 SATURATION: CPT | Performed by: SURGERY

## 2018-01-25 PROCEDURE — 84478 ASSAY OF TRIGLYCERIDES: CPT | Performed by: PHYSICIAN ASSISTANT

## 2018-01-25 PROCEDURE — 82330 ASSAY OF CALCIUM: CPT

## 2018-01-25 PROCEDURE — 83605 ASSAY OF LACTIC ACID: CPT | Performed by: SURGERY

## 2018-01-25 PROCEDURE — 86850 RBC ANTIBODY SCREEN: CPT | Performed by: SURGERY

## 2018-01-25 PROCEDURE — 87147 CULTURE TYPE IMMUNOLOGIC: CPT | Performed by: NEUROLOGICAL SURGERY

## 2018-01-25 PROCEDURE — 99254 IP/OBS CNSLTJ NEW/EST MOD 60: CPT | Performed by: INTERNAL MEDICINE

## 2018-01-25 PROCEDURE — 84157 ASSAY OF PROTEIN OTHER: CPT | Performed by: NEUROLOGICAL SURGERY

## 2018-01-25 PROCEDURE — 94760 N-INVAS EAR/PLS OXIMETRY 1: CPT

## 2018-01-25 PROCEDURE — 87075 CULTR BACTERIA EXCEPT BLOOD: CPT | Performed by: NEUROLOGICAL SURGERY

## 2018-01-25 PROCEDURE — 85007 BL SMEAR W/DIFF WBC COUNT: CPT | Performed by: SURGERY

## 2018-01-25 PROCEDURE — 86900 BLOOD TYPING SEROLOGIC ABO: CPT | Performed by: SURGERY

## 2018-01-25 PROCEDURE — 84295 ASSAY OF SERUM SODIUM: CPT

## 2018-01-25 PROCEDURE — 82947 ASSAY GLUCOSE BLOOD QUANT: CPT

## 2018-01-25 PROCEDURE — 70450 CT HEAD/BRAIN W/O DYE: CPT

## 2018-01-25 PROCEDURE — 83735 ASSAY OF MAGNESIUM: CPT | Performed by: EMERGENCY MEDICINE

## 2018-01-25 PROCEDURE — 009630Z DRAINAGE OF CEREBRAL VENTRICLE WITH DRAINAGE DEVICE, PERCUTANEOUS APPROACH: ICD-10-PCS | Performed by: NEUROLOGICAL SURGERY

## 2018-01-25 PROCEDURE — 0DH64UZ INSERTION OF FEEDING DEVICE INTO STOMACH, PERCUTANEOUS ENDOSCOPIC APPROACH: ICD-10-PCS | Performed by: SURGERY

## 2018-01-25 PROCEDURE — 87102 FUNGUS ISOLATION CULTURE: CPT | Performed by: NEUROLOGICAL SURGERY

## 2018-01-25 PROCEDURE — 80202 ASSAY OF VANCOMYCIN: CPT | Performed by: EMERGENCY MEDICINE

## 2018-01-25 PROCEDURE — 87077 CULTURE AEROBIC IDENTIFY: CPT | Performed by: NEUROLOGICAL SURGERY

## 2018-01-25 PROCEDURE — 71046 X-RAY EXAM CHEST 2 VIEWS: CPT

## 2018-01-25 PROCEDURE — 0W9J00Z DRAINAGE OF PELVIC CAVITY WITH DRAINAGE DEVICE, OPEN APPROACH: ICD-10-PCS | Performed by: SURGERY

## 2018-01-25 PROCEDURE — 94002 VENT MGMT INPAT INIT DAY: CPT

## 2018-01-25 PROCEDURE — 82805 BLOOD GASES W/O2 SATURATION: CPT | Performed by: EMERGENCY MEDICINE

## 2018-01-25 PROCEDURE — 87205 SMEAR GRAM STAIN: CPT | Performed by: NEUROLOGICAL SURGERY

## 2018-01-25 PROCEDURE — 87176 TISSUE HOMOGENIZATION CULTR: CPT | Performed by: NEUROLOGICAL SURGERY

## 2018-01-25 PROCEDURE — 80048 BASIC METABOLIC PNL TOTAL CA: CPT | Performed by: SURGERY

## 2018-01-25 PROCEDURE — 85027 COMPLETE CBC AUTOMATED: CPT | Performed by: SURGERY

## 2018-01-25 PROCEDURE — 0W9J0ZZ DRAINAGE OF PELVIC CAVITY, OPEN APPROACH: ICD-10-PCS | Performed by: SURGERY

## 2018-01-25 PROCEDURE — 89051 BODY FLUID CELL COUNT: CPT | Performed by: NEUROLOGICAL SURGERY

## 2018-01-25 DEVICE — INTEGRA® NEUROSCIENCES CONNECTOR PUDENZ STRAIGHT CONNECTOR
Type: IMPLANTABLE DEVICE | Status: NON-FUNCTIONAL
Brand: INTEGRA®
Removed: 2018-02-05

## 2018-01-25 DEVICE — ICP CATH BACTISEAL EVD LRG 1.9MM X 35CM
Type: IMPLANTABLE DEVICE | Site: CHEST  WALL | Status: NON-FUNCTIONAL
Removed: 2018-02-05

## 2018-01-25 RX ORDER — PROPOFOL 10 MG/ML
5-50 INJECTION, EMULSION INTRAVENOUS
Status: DISCONTINUED | OUTPATIENT
Start: 2018-01-25 | End: 2018-01-26

## 2018-01-25 RX ORDER — MAGNESIUM SULFATE HEPTAHYDRATE 40 MG/ML
2 INJECTION, SOLUTION INTRAVENOUS ONCE
Status: COMPLETED | OUTPATIENT
Start: 2018-01-25 | End: 2018-01-26

## 2018-01-25 RX ORDER — PANTOPRAZOLE SODIUM 40 MG/1
40 TABLET, DELAYED RELEASE ORAL DAILY
COMMUNITY
End: 2018-04-13 | Stop reason: HOSPADM

## 2018-01-25 RX ORDER — SODIUM CHLORIDE, SODIUM LACTATE, POTASSIUM CHLORIDE, CALCIUM CHLORIDE 600; 310; 30; 20 MG/100ML; MG/100ML; MG/100ML; MG/100ML
INJECTION, SOLUTION INTRAVENOUS CONTINUOUS PRN
Status: DISCONTINUED | OUTPATIENT
Start: 2018-01-25 | End: 2018-01-25 | Stop reason: SURG

## 2018-01-25 RX ORDER — ONDANSETRON 2 MG/ML
4 INJECTION INTRAMUSCULAR; INTRAVENOUS ONCE AS NEEDED
Status: COMPLETED | OUTPATIENT
Start: 2018-01-25 | End: 2018-01-27

## 2018-01-25 RX ORDER — ALBUTEROL SULFATE 2.5 MG/3ML
2.5 SOLUTION RESPIRATORY (INHALATION) ONCE AS NEEDED
Status: DISCONTINUED | OUTPATIENT
Start: 2018-01-25 | End: 2018-01-26

## 2018-01-25 RX ORDER — MEPERIDINE HYDROCHLORIDE 25 MG/ML
12.5 INJECTION INTRAMUSCULAR; INTRAVENOUS; SUBCUTANEOUS AS NEEDED
Status: DISCONTINUED | OUTPATIENT
Start: 2018-01-25 | End: 2018-01-26

## 2018-01-25 RX ORDER — ALBUMIN, HUMAN INJ 5% 5 %
SOLUTION INTRAVENOUS CONTINUOUS PRN
Status: DISCONTINUED | OUTPATIENT
Start: 2018-01-25 | End: 2018-01-25 | Stop reason: SURG

## 2018-01-25 RX ORDER — FENTANYL CITRATE/PF 50 MCG/ML
25 SYRINGE (ML) INJECTION
Status: DISCONTINUED | OUTPATIENT
Start: 2018-01-25 | End: 2018-01-26

## 2018-01-25 RX ORDER — PROPOFOL 10 MG/ML
INJECTION, EMULSION INTRAVENOUS AS NEEDED
Status: DISCONTINUED | OUTPATIENT
Start: 2018-01-25 | End: 2018-01-25 | Stop reason: SURG

## 2018-01-25 RX ORDER — NORTRIPTYLINE HYDROCHLORIDE 10 MG/1
CAPSULE ORAL 2 TIMES DAILY
COMMUNITY
End: 2018-04-13 | Stop reason: HOSPADM

## 2018-01-25 RX ORDER — PROPOFOL 10 MG/ML
INJECTION, EMULSION INTRAVENOUS CONTINUOUS PRN
Status: DISCONTINUED | OUTPATIENT
Start: 2018-01-25 | End: 2018-01-25 | Stop reason: SURG

## 2018-01-25 RX ORDER — ONDANSETRON 2 MG/ML
INJECTION INTRAMUSCULAR; INTRAVENOUS AS NEEDED
Status: DISCONTINUED | OUTPATIENT
Start: 2018-01-25 | End: 2018-01-25 | Stop reason: SURG

## 2018-01-25 RX ORDER — ROCURONIUM BROMIDE 10 MG/ML
INJECTION, SOLUTION INTRAVENOUS AS NEEDED
Status: DISCONTINUED | OUTPATIENT
Start: 2018-01-25 | End: 2018-01-25 | Stop reason: SURG

## 2018-01-25 RX ORDER — GABAPENTIN 300 MG/1
300 CAPSULE ORAL
COMMUNITY
End: 2018-04-13 | Stop reason: HOSPADM

## 2018-01-25 RX ORDER — FENTANYL CITRATE 50 UG/ML
INJECTION, SOLUTION INTRAMUSCULAR; INTRAVENOUS AS NEEDED
Status: DISCONTINUED | OUTPATIENT
Start: 2018-01-25 | End: 2018-01-25 | Stop reason: SURG

## 2018-01-25 RX ORDER — SODIUM CHLORIDE 9 MG/ML
INJECTION, SOLUTION INTRAVENOUS CONTINUOUS PRN
Status: DISCONTINUED | OUTPATIENT
Start: 2018-01-25 | End: 2018-01-25 | Stop reason: SURG

## 2018-01-25 RX ADMIN — PANTOPRAZOLE SODIUM 40 MG: 40 INJECTION, POWDER, FOR SOLUTION INTRAVENOUS at 21:33

## 2018-01-25 RX ADMIN — DEXAMETHASONE SODIUM PHOSPHATE 10 MG: 10 INJECTION INTRAMUSCULAR; INTRAVENOUS at 18:33

## 2018-01-25 RX ADMIN — SODIUM CHLORIDE: 0.9 INJECTION, SOLUTION INTRAVENOUS at 16:05

## 2018-01-25 RX ADMIN — ONDANSETRON 4 MG: 2 INJECTION INTRAMUSCULAR; INTRAVENOUS at 18:33

## 2018-01-25 RX ADMIN — ROCURONIUM BROMIDE 50 MG: 10 INJECTION INTRAVENOUS at 15:59

## 2018-01-25 RX ADMIN — FENTANYL CITRATE 100 MCG: 50 INJECTION, SOLUTION INTRAMUSCULAR; INTRAVENOUS at 15:59

## 2018-01-25 RX ADMIN — FENTANYL CITRATE 75 MCG/HR: at 21:37

## 2018-01-25 RX ADMIN — VANCOMYCIN HYDROCHLORIDE 1000 MG: 1 INJECTION, SOLUTION INTRAVENOUS at 11:00

## 2018-01-25 RX ADMIN — MICAFUNGIN SODIUM 100 MG: 10 INJECTION, POWDER, LYOPHILIZED, FOR SOLUTION INTRAVENOUS at 23:13

## 2018-01-25 RX ADMIN — PIPERACILLIN SODIUM AND TAZOBACTAM SODIUM 3.38 G: 36; 4.5 INJECTION, POWDER, FOR SOLUTION INTRAVENOUS at 11:00

## 2018-01-25 RX ADMIN — SODIUM CHLORIDE, SODIUM GLUCONATE, SODIUM ACETATE, POTASSIUM CHLORIDE AND MAGNESIUM CHLORIDE 400 ML: 526; 502; 368; 37; 30 INJECTION, SOLUTION INTRAVENOUS at 15:55

## 2018-01-25 RX ADMIN — IOHEXOL 100 ML: 350 INJECTION, SOLUTION INTRAVENOUS at 09:05

## 2018-01-25 RX ADMIN — PROPOFOL 150 MG: 10 INJECTION, EMULSION INTRAVENOUS at 15:59

## 2018-01-25 RX ADMIN — HYDROMORPHONE HYDROCHLORIDE 1 MG: 1 INJECTION, SOLUTION INTRAMUSCULAR; INTRAVENOUS; SUBCUTANEOUS at 06:36

## 2018-01-25 RX ADMIN — SODIUM CHLORIDE, SODIUM LACTATE, POTASSIUM CHLORIDE, AND CALCIUM CHLORIDE: .6; .31; .03; .02 INJECTION, SOLUTION INTRAVENOUS at 20:17

## 2018-01-25 RX ADMIN — PIPERACILLIN SODIUM AND TAZOBACTAM SODIUM 3.38 G: 36; 4.5 INJECTION, POWDER, FOR SOLUTION INTRAVENOUS at 17:01

## 2018-01-25 RX ADMIN — MAGNESIUM SULFATE HEPTAHYDRATE 2 G: 40 INJECTION, SOLUTION INTRAVENOUS at 11:01

## 2018-01-25 RX ADMIN — HYDROMORPHONE HYDROCHLORIDE 1 MG: 1 INJECTION, SOLUTION INTRAMUSCULAR; INTRAVENOUS; SUBCUTANEOUS at 08:30

## 2018-01-25 RX ADMIN — ROCURONIUM BROMIDE 30 MG: 10 INJECTION INTRAVENOUS at 19:59

## 2018-01-25 RX ADMIN — CHLORHEXIDINE GLUCONATE 15 ML: 1.2 RINSE ORAL at 09:20

## 2018-01-25 RX ADMIN — SODIUM CHLORIDE, SODIUM GLUCONATE, SODIUM ACETATE, POTASSIUM CHLORIDE AND MAGNESIUM CHLORIDE 100 ML/HR: 526; 502; 368; 37; 30 INJECTION, SOLUTION INTRAVENOUS at 09:21

## 2018-01-25 RX ADMIN — PIPERACILLIN SODIUM AND TAZOBACTAM SODIUM 3.38 G: 36; 4.5 INJECTION, POWDER, FOR SOLUTION INTRAVENOUS at 00:50

## 2018-01-25 RX ADMIN — HYDROMORPHONE HYDROCHLORIDE 1 MG: 1 INJECTION, SOLUTION INTRAMUSCULAR; INTRAVENOUS; SUBCUTANEOUS at 04:18

## 2018-01-25 RX ADMIN — PROPOFOL 40 MCG/KG/MIN: 10 INJECTION, EMULSION INTRAVENOUS at 20:47

## 2018-01-25 RX ADMIN — ALBUMIN HUMAN: 0.05 INJECTION, SOLUTION INTRAVENOUS at 18:41

## 2018-01-25 RX ADMIN — CHLORHEXIDINE GLUCONATE 15 ML: 1.2 RINSE ORAL at 21:33

## 2018-01-25 RX ADMIN — PIPERACILLIN SODIUM AND TAZOBACTAM SODIUM 3.38 G: 36; 4.5 INJECTION, POWDER, FOR SOLUTION INTRAVENOUS at 04:33

## 2018-01-25 RX ADMIN — PANTOPRAZOLE SODIUM 40 MG: 40 INJECTION, POWDER, FOR SOLUTION INTRAVENOUS at 09:20

## 2018-01-25 RX ADMIN — VANCOMYCIN HYDROCHLORIDE 1000 MG: 1 INJECTION, SOLUTION INTRAVENOUS at 22:23

## 2018-01-25 RX ADMIN — ALBUMIN HUMAN: 0.05 INJECTION, SOLUTION INTRAVENOUS at 19:02

## 2018-01-25 RX ADMIN — PIPERACILLIN SODIUM AND TAZOBACTAM SODIUM 3.38 G: 36; 4.5 INJECTION, POWDER, FOR SOLUTION INTRAVENOUS at 22:30

## 2018-01-25 RX ADMIN — VANCOMYCIN HYDROCHLORIDE 1000 MG: 1 INJECTION, SOLUTION INTRAVENOUS at 02:15

## 2018-01-25 RX ADMIN — ALBUMIN HUMAN: 0.05 INJECTION, SOLUTION INTRAVENOUS at 18:15

## 2018-01-25 RX ADMIN — MAGNESIUM SULFATE HEPTAHYDRATE 2 G: 40 INJECTION, SOLUTION INTRAVENOUS at 23:55

## 2018-01-25 RX ADMIN — HYDROMORPHONE HYDROCHLORIDE 1 MG: 1 INJECTION, SOLUTION INTRAMUSCULAR; INTRAVENOUS; SUBCUTANEOUS at 00:21

## 2018-01-25 RX ADMIN — HYDROMORPHONE HYDROCHLORIDE 1 MG: 1 INJECTION, SOLUTION INTRAMUSCULAR; INTRAVENOUS; SUBCUTANEOUS at 13:51

## 2018-01-25 NOTE — PROGRESS NOTES
Progress Note - Neurosurgery   Humble Saleem 36 y o  female MRN: [id number]  Unit/Bed#: ICU 10 Encounter: [id number]    Assessment:  1  Abdominal sepsis  2  Status post bariatric procedures  3  Pre-existing right-sided  shunt system  4   shunt system placed pseudotumor cerebri 2016  5  GCS 15 nonfocal neuro exam  6  No meningism    Plan:  1  Externalize Right  shunt system in anterior chest wall ribs one and two region today- to [place] closed external drainage system  2  Would do this prior to her ex lap surgery for drainage of intra abdominal loculations and collection    Impression discussion: This 53-year-old woman was transferred from [facility] for stent procedure in the OR today     She has had prior laparoscopic bariatric surgery     Secondary issues were also addressed laparoscopically     There is intra-abdominal sepsis with at least three separate areas of loculated fluid likely infected  Will need ex lap  A right  shunt will need to be externalized to isolate this from any intra-abdominal sepsis with risk of extension up into the intracranial cavity  She has drains in      On Zosyn IV and vancomycin IV     She had a right  shunt system region  placed for pseudotumor cerebri in 2016     Discussed the management Dr Cj Cutler and the trauma team    Will plan for OR time this afternoon    The risks, benefits and complications of surgery were explained in detail to Humble Saleem and her mother Fabby Wharton including hemorrhage, infection, CSF leakage, wound problems, pain, weakness, numbness, dysesthesiae, paralysis, coma, and death  Also, the possibility of further surgery being required was emphasized  This might be to remove the shunt system in entirety or shorter or revise replace  Other potential medical complications were outlined, including deep venous thrombosis, pulmonary embolism, pneumonia, urinary tract infection, myocardial infarction,  and stroke  The need for physical therapy, occupational therapy, and rehabilitation was also mentioned  The alternatives to surgery were discussed    She signed consent witnessed by her mother      Rosaura Sinclair and her mother Amy Hsu and daughter Omero Morris asked relevant questions and asked that we proceed with arrangements for surgery  We plan to externalize the right  shunt system in the anterior chest wall in ribs to a three region below the clavicle prior to Dr Kyle Walsh and the trauma team proceeding with that ex lap surgery for her intra-abdominal sepsis and washout and drainage of intra-abdominal collection        Subjective/Objective   Chief Complaint: pain    Subjective: abdominal pain    Objective:  GCS 15 Talking alert  Normal neuro exam:    COGNITIVE:  Patient is tired, flat affect alert, but interactive  Speech is fluent  There is no dysarthria  CRANIAL NERVES: External ocular movements are full  Upgaze is good pupils are 3 mm reacting down to 2 mm briskly  There is no afferent pupillary defect  No evidence of papilledema  Visual fields are full to confrontation in all 4 quadrants of each eye  Facial sensation is normal  Facial movement is equal and symmetrical  Palatal elevation is equal and symmetric  There is no wasting or fasciculation of the tongue  Motor exam: Power and tone in the upper and lower extremities are normal, grade 5/5 in all muscle groups tested individually    Reflexes: Upper extremity reflexes show grade 2 reflexes at biceps, triceps, and supinator  The lower extremities the reflexes are grade 2 equal and symmetric and ankle reflexes are grade 1 equal and symmetric  Plantar responses are flexor     Sensory examination:  normal in the upper and lower extremities to light touch, pinprick, with intact joint position sense at the indices, and at the halluces in the feet  Double simultaneous touch accurately appreciated  Gait: NE     Abdomen tender  Drains   Poor BS Distended    Intake/Output       01/25/18 0701 - 01/26/18 0700      [id number] 7460-3707 Total       Intake    I V   600  -- 600    IV Piggyback  300  -- 300    Total Intake 900 -- 900       Output    Urine  250  -- 250    Urine 250 -- 250    Drains  105  -- 105    Output (mL) (Closed/Suction Drain Left Abdomen Bulb) 5 -- 5    Output (mL) (Open Drain Right Abdomen) 100 -- 100    Total Output 355 -- 355       Net I/O     545 -- 545          Invasive Devices     Peripherally Inserted Central Catheter Line            PICC Line 15/68/56 Right Basilic 4 days          Drain            Closed/Suction Drain Left Abdomen Bulb -- days    Open Drain Right Abdomen -- days                Physical Exam: as above    Vitals: Blood pressure 137/66, pulse 86, temperature 98 2 °F (36 8 °C), temperature source Oral, resp  rate 17, height 5' 4" (1 626 m), weight 70 9 kg (156 lb 4 9 oz), SpO2 98 %  ,Body mass index is 26 83 kg/m²  Hemodynamic Monitoring: MAP:                Lab Results: I have personally reviewed pertinent results        Admission on 01/24/2018   Component Date Value    Sodium 01/24/2018 137     Potassium 01/24/2018 4 9     Chloride 01/24/2018 102     CO2 01/24/2018 31     Anion Gap 01/24/2018 4     BUN 01/24/2018 21     Creatinine 01/24/2018 0 37*    Glucose 01/24/2018 109     Calcium 01/24/2018 10 2*    AST 01/24/2018 28     ALT 01/24/2018 61     Alkaline Phosphatase 01/24/2018 177*    Total Protein 01/24/2018 5 3*    Albumin 01/24/2018 1 3*    Total Bilirubin 01/24/2018 0 91     eGFR 01/24/2018 131     Magnesium 01/24/2018 2 1     Protime 01/24/2018 15 9*    INR 01/24/2018 1 26*    PTT 01/24/2018 28     LACTIC ACID 01/24/2018 1 5     WBC 01/24/2018 15 42*    RBC 01/24/2018 3 40*    Hemoglobin 01/24/2018 10 3*    Hematocrit 01/24/2018 37 8     MCV 01/24/2018 111*    MCH 01/24/2018 30 3     MCHC 01/24/2018 27 2*    RDW 01/24/2018 15 0     MPV 01/24/2018 10 5     Platelets 68/99/8501 507*  nRBC 01/24/2018 0     ABO Grouping 01/24/2018 A     Rh Factor 01/24/2018 Negative     Antibody Screen 01/24/2018 Negative     Specimen Expiration Date 01/24/2018 [id number]     Segmented % 01/24/2018 91*    Bands % 01/24/2018 2     Lymphocytes % 01/24/2018 0*    Monocytes % 01/24/2018 5     Eosinophils % 01/24/2018 0     Basophils % 01/24/2018 0     Metamyelocytes% 01/24/2018 1     Myelocytes % 01/24/2018 1     Absolute Neutrophils 01/24/2018 14 34*    Lymphocytes Absolute 01/24/2018 0 00*    Monocytes Absolute 01/24/2018 0 77     Eosinophils Absolute 01/24/2018 0 00     Basophils Absolute 01/24/2018 0 00     RBC Morphology 01/24/2018 Present     Anisocytosis 01/24/2018 Present     Platelet Estimate 10/23/0329 Increased*    Vancomycin Tr 01/25/2018 7 9*    POC Glucose 01/25/2018 114     Sodium 01/25/2018 138     Potassium 01/25/2018 4 9     Chloride 01/25/2018 102     CO2 01/25/2018 32     Anion Gap 01/25/2018 4     BUN 01/25/2018 22     Creatinine 01/25/2018 0 44*    Glucose 01/25/2018 110     Calcium 01/25/2018 10 3*    eGFR 01/25/2018 124     Magnesium 01/25/2018 1 5*    Phosphorus 01/25/2018 4 1     WBC 01/25/2018 14 61*    RBC 01/25/2018 3 64*    Hemoglobin 01/25/2018 10 7*    Hematocrit 01/25/2018 33 2*    MCV 01/25/2018 91     MCH 01/25/2018 29 4     MCHC 01/25/2018 32 2     RDW 01/25/2018 14 2     MPV 01/25/2018 9 9     Platelets 16/24/3908 572*    nRBC 01/25/2018 0     Segmented % 01/25/2018 83*    Bands % 01/25/2018 1     Lymphocytes % 01/25/2018 5*    Monocytes % 01/25/2018 7     Eosinophils % 01/25/2018 1     Basophils % 01/25/2018 1     Atypical Lymphocytes % 01/25/2018 2*    Absolute Neutrophils 01/25/2018 12 27*    Lymphocytes Absolute 01/25/2018 0 73     Monocytes Absolute 01/25/2018 1 02     Eosinophils Absolute 01/25/2018 0 15     Basophils Absolute 01/25/2018 0 15*    Platelet Estimate 61/63/8799 Increased*    Triglycerides 01/25/2018 130        Imaging Studies: I have personally reviewed pertinent films in PACS with a Radiologist     EKG, Pathology, and Other Studies: I have personally reviewed pertinent reports        VTE Pharmacologic Prophylaxis: Sequential compression device (Venodyne)     VTE Mechanical Prophylaxis: sequential compression device

## 2018-01-25 NOTE — PROGRESS NOTES
Progress Note - Neurosurgery   Chalo Cano 36 y o  female MRN: [id number]  Unit/Bed#: ICU 10 Encounter: [id number]    Assessment:  1  Abdominal sepsis  2  Status post bariatric procedures  3  Pre-existing right-sided  shunt system  4   shunt system placed pseudotumor cerebri 2016  5  GCS 15 nonfocal neuro exam  6  No meningism    Plan:  1  CT head in a m   2  Shunt survey in a m  3  Recommend externalization of right  shunt system likely at anterior intercostal space ribs 2-3  in a m  4  Hold heparin subcu at 0 600 pending externalization procedure mid morning    This 23-year-old womanwas transferred from [facility] for stent procedure in the OR today    She has had prior laparoscopic bariatric surgery    Secondary issues were also addressed laparoscopically    There is intra-abdominal sepsis  She has drains in     On Zosyn IV and vancomycin IV    She has a right  shunt system region the placed for pseudotumor cerebri in 2016 the    The time frame for the recovery in healing from the abdominal sepsis he may be prolonged    Recommend recommend externalization of the  shunt system  Will get baseline imaging studies first      I discussed her issues and plans with Dr Eriberto Fox and Dr Nan Ching and with Dr Yasmine Shelton who had discussed externalization recommendations with Dr Trey Aguilar at [facility]    However was septic before with low platelets    So deferred    I discussed the projected plans fall bedside externalization under local with her daughter Tao Amador    She agreed with externalization plan    Subjective/Objective   Chief Complaint:  Abdominal pain    Subjective:  Abdominal pain    Objective:  Recent multiple laparoscopic surgeries head and secondary surgeries for abdominal/sepsis and placement of stents secondary to bariatric surgery    Will review records    Intake/Output       01/24/18 0701 - 01/25/18 0700      5056-0997 5328-5330 Total       Intake    Total Intake -- -- -- Output    Urine  --  400 400    Urine -- 400 400    Drains  --  350 350    Output (mL) (Closed/Suction Drain Left Abdomen Bulb) -- 0 0    Output (mL) (Open Drain Right Abdomen) -- 350 350    Total Output -- 750 750       Net I/O     -- -750 -750          Invasive Devices     Peripherally Inserted Central Catheter Line            PICC Line 48/37/49 Right Basilic 3 days          Drain            Closed/Suction Drain Left Abdomen Bulb -- days    Open Drain Right Abdomen -- days                Physical Exam:  Awake alert interactive  Very tired    GCS 15 nonfocal exam    Abdominal lap ports and drains    Vitals: Blood pressure 130/69, pulse 94, temperature 98 6 °F (37 °C), temperature source Rectal, resp  rate 17, height 5' 4" (1 626 m), weight 70 9 kg (156 lb 4 9 oz), SpO2 100 %  ,Body mass index is 26 83 kg/m²  Hemodynamic Monitoring: MAP:                Lab Results: I have personally reviewed pertinent results        Admission on 01/24/2018   Component Date Value    Magnesium 01/24/2018 2 1     Protime 01/24/2018 15 9*    INR 01/24/2018 1 26*    PTT 01/24/2018 28     LACTIC ACID 01/24/2018 1 5     WBC 01/24/2018 15 42*    RBC 01/24/2018 3 40*    Hemoglobin 01/24/2018 10 3*    Hematocrit 01/24/2018 37 8     MCV 01/24/2018 111*    MCH 01/24/2018 30 3     MCHC 01/24/2018 27 2*    RDW 01/24/2018 15 0     MPV 01/24/2018 10 5     Platelets 16/40/3860 507*    nRBC 01/24/2018 0     Segmented % 01/24/2018 91*    Bands % 01/24/2018 2     Lymphocytes % 01/24/2018 0*    Monocytes % 01/24/2018 5     Eosinophils % 01/24/2018 0     Basophils % 01/24/2018 0     Metamyelocytes% 01/24/2018 1     Myelocytes % 01/24/2018 1     Absolute Neutrophils 01/24/2018 14 34*    Lymphocytes Absolute 01/24/2018 0 00*    Monocytes Absolute 01/24/2018 0 77     Eosinophils Absolute 01/24/2018 0 00     Basophils Absolute 01/24/2018 0 00     RBC Morphology 01/24/2018 Present     Anisocytosis 01/24/2018 Present     Platelet Estimate 98/89/6197 Increased*       Imaging Studies: I have personally reviewed pertinent reports  EKG, Pathology, and Other Studies: I have personally reviewed pertinent reports        VTE Pharmacologic Prophylaxis: Sequential compression device (Venodyne)     VTE Mechanical Prophylaxis: sequential compression device

## 2018-01-25 NOTE — PROGRESS NOTES
Progress Note - General Surgery   Michelle Melo 36 y o  female MRN: [id number]  Unit/Bed#: ICU 10 Encounter: [id number]    Assessment and Plan:  Patient Active Problem List   Diagnosis    Gastric leak    Acute peritonitis ([address] )    Idiopathic intracranial hypertension    S/P  shunt       Assessment:  41y/o F transfer from [facility] with history of laparoscopic sleeve gastrectomy with  shunt shortening on 12/19/16, lap hiatal hernia repair on 1/11/18 , ex laparoscopic repair of gastric perforation on 1/14    Plan:  Serial abd exams   Continue broad spectrum abx   Follow up on CT head in a m  Will also obtain a CT Chest, Abd, pelvis w/ Bariatric protocol to define anatomy & placement of drains   Follow up on Shunt survey in a m  Follow up on possible externalization of right  shunt system likely at anterior intercostal space ribs 2-3  in a m  Hold am SQH heparin - continue venodynes - will need to restart anticoagulation as pt has an upper ext DVT   Pain control   Rest of care as per SICU team     Subjective:  Patient states she has abdominal pain although is better than previous  She is currently on room air  Two abd drains are in place    Objective:       Vitals   Vitals:    01/25/18 0220 01/25/18 0320 01/25/18 0420 01/25/18 0520   BP: 131/66 118/70 134/76 119/66   BP Location:       Pulse: 84 86 86 86   Resp: (!) 10 12 (!) 29 14   Temp:   97 8 °F (36 6 °C)    TempSrc:   Oral    SpO2: 100% 100% 100% 96%   Weight:       Height:         Temp  Min: 97 8 °F (36 6 °C)  Max: 98 6 °F (37 °C)  IBW: 54 7 kg   Body mass index is 26 83 kg/m²      I/O   I/O       01/23 0701 - 01/24 0700 01/24 0701 - 01/25 0700    I V  (mL/kg)  536 7 (7 6)    IV Piggyback  100    Total Intake(mL/kg)  636 7 (9)    Urine (mL/kg/hr)  900    Drains  600    Total Output   1500    Net   -863 3                Intake/Output Summary (Last 24 hours) at 01/25/18 8833  Last data filed at 01/25/18 0503   Gross per 24 hour   Intake 636 67 ml   Output             1500 ml   Net          -863 33 ml       Invasive  Devices  Invasive Devices     Peripherally Inserted Central Catheter Line            PICC Line 84/83/96 Right Basilic 4 days          Drain            Closed/Suction Drain Left Abdomen Bulb -- days    Open Drain Right Abdomen -- days                Active medications  Scheduled Meds:  chlorhexidine 15 mL Swish & Spit Q12H [facility]   micafungin 100 mg Intravenous Q24H   pantoprazole 40 mg Intravenous Q12H [facility]   piperacillin-tazobactam 3 375 g Intravenous Q6H   vancomycin 15 mg/kg Intravenous Q12H     Continuous Infusions:    multi-electrolyte 100 mL/hr Last Rate: 100 mL/hr (01/24/18 1915)     PRN Meds:     HYDROmorphone 1 mg Q2H PRN       Physical Exam: /66   Pulse 86   Temp 97 8 °F (36 6 °C) (Oral)   Resp 14   Ht 5' 4" (1 626 m)   Wt 70 9 kg (156 lb 4 9 oz)   SpO2 96%   BMI 26 83 kg/m²     Physical Exam:  General Appearance: Alert, cooperative, no distress, appears stated age  Lungs: Clear to auscultation bilaterally, respirations unlablored   Heart: Regular rate and rhythm, S1 and S2 normal, no murmur, rub or gallop  Abdomen: Soft, non-tender, mildly distended, bowel sounds active in all four quadrants,   No masses or organomegaly  Left ADENIKE - scant amount of grey thick drainage   Right drain / gravity bag - yellow with sediment drainage     Laboratory and Diagnostics:    Results from last 7 days  Lab Units 01/25/18  0427 01/24/18  1954   WBC Thousand/uL 14 61* 15 42*   HEMOGLOBIN g/dL 10 7* 10 3*   HEMATOCRIT % 33 2* 37 8   PLATELETS Thousands/uL 572* 507*   MONO PCT MAN %  --  5       Results from last 7 days  Lab Units 01/25/18  0427 01/24/18  2244   SODIUM mmol/L 138 137   POTASSIUM mmol/L 4 9 4 9   CHLORIDE mmol/L 102 102   CO2 mmol/L 32 31   BUN mg/dL 22 21   CREATININE mg/dL 0 44* 0 37*   CALCIUM mg/dL 10 3* 10 2*   TOTAL PROTEIN g/dL  --  5 3*   BILIRUBIN TOTAL mg/dL  --  0 91   ALK PHOS U/L  --  177*   ALT U/L  -- 61   AST U/L  --  28   GLUCOSE RANDOM mg/dL 110 109       Results from last 7 days  Lab Units 01/25/18  0427 01/24/18 1954   MAGNESIUM mg/dL 1 5* 2 1     Lab Results   Component Value Date    PHOS 4 1 01/25/2018        Results from last 7 days  Lab Units 01/24/18 1954   INR  1 26*   PTT seconds 28     No results found for: TROPONINT  ABG:No results found for: PHART, EJS7YXY, PO2ART, JEJ9OBA, A5LIOBIM, BEART, SOURCE    Blood Culture: No results found for: BLOODCX  Urine Culture: No results found for: URINECX  Sputum Culture: No components found for: SPUTUMCX    Imaging: I have personally reviewed pertinent reports     and I have personally reviewed pertinent films in PACS    VTE Pharmacologic Prophylaxis: Sequential compression device (Venodyne)   VTE Mechanical Prophylaxis: sequential compression device

## 2018-01-25 NOTE — OP NOTE
OPERATIVE REPORT  PATIENT NAME: Hayden Robison    :  1975  MRN: [id number]  Pt Location: [facility] OR ROOM 17    SURGERY DATE: 2018    Surgeon(s) and Role:  Panel 1:     * Mars Orlando MD - Primary    Panel 2:     * Kandice Brunner, DO - Primary     * Royal Root MD - Assisting    Preop Diagnosis:  Acute peritonitis ([address] ) [K65 0]  Peritonitis ([address] ) [K65 9]  S/P  shunt [Z98 2]  Gastric leak [K91 89]   (ventriculoperitoneal) shunt status [Z98 2]    Post-Op Diagnosis Codes:     * Acute peritonitis ([address] ) [K65 0]     * Peritonitis ([address] ) [K65 9]     * S/P  shunt [Z98 2]     * Gastric leak [K91 89]     *  (ventriculoperitoneal) shunt status [Z98 2]    Procedure(s) (LRB):  Externalization of right-sided SHUNT VENTRICULAR-PERITONEAL in anterior chest wall ribs two and three level   (Right)  Exploratory Laparotomy  (N/A)    Specimen(s):  ID Type Source Tests Collected by Time Destination   A : CULTURE / GRAM STAIN: Right chest  shunt Cerebrospinal Fluid Brain GLUCOSE, CSF, PROTEIN TOTAL, CSF, CSF WBC COUNT WITH DIFFERENTIAL, FUNGAL CULTURE, VIRUS CULTURE, CSF CULTURE AND GRAM STAIN, AFB CULTURE WITH STAIN, RED CELL COUNT, CSF Mars Orlando MD 2018 1651    B : Purulent fluid anterior chest wall incision rib 2 Tissue Chest Wall ANAEROBIC CULTURE AND GRAM STAIN, FUNGAL CULTURE, CULTURE, TISSUE AND GRAM STAIN Mars Orlando MD 2018 1704    C : Peritoneal catheter tip for aerobic culture Catheter Tip Catheter Tip CATHETER TIP CULTURE Mars Orlando MD 2018 1709    D : 2nd intercostal space wound cultures Tissue Chest Wall ANAEROBIC CULTURE AND GRAM STAIN, FUNGAL CULTURE, CULTURE, TISSUE AND GRAM STAIN Mars Orlando MD 2018 1710        Estimated Blood Loss:   Minimal    Drains:  Closed/Suction Drain Left Abdomen Bulb (Active)   Site Description Unable to view 2018 12:00 PM   Dressing Status Clean;Dry; Intact 2018 12:00 PM   Drainage Appearance Tan;Thin;Clots 1/25/2018 12:00 PM   Status To bulb suction 1/25/2018 12:00 PM   Output (mL) 5 mL 1/25/2018 12:00 PM   Number of days:        Open Drain Right Abdomen (Active)   Site Description Unable to view 1/25/2018 12:00 PM   Dressing Status Clean;Dry; Intact 1/25/2018 12:00 PM   Drainage Appearance Thin;Yellow 1/25/2018 12:00 PM   Status Unclamped 1/25/2018 12:00 PM   Output (mL) 15 mL 1/25/2018 12:00 PM   Number of days:        Anesthesia Type:   General    Operative Indications:  Acute peritonitis ([address] ) [K65 0]  Peritonitis ([address] ) [K65 9]  S/P  shunt [Z98 2]  Gastric leak [K91 89]   (ventriculoperitoneal) shunt status [Z98 2]    Discussion   This  76-year-old woman underwent gastric sleeve bariatric surgery in 2016  She later developed hiatal hernia    This was repaired laparoscopially December 2017 However she developed a gastric leak  The laparoscopic attempted repair and drainage    Gastric stent placed  However, developed further intra-abdominal loculations -  at least three large ones       Abdomen is likely contaminated and the loculations will need to be drained    She has a pre-existing  shunt system from 2016 secondary to pseudotumor    For optimum management her right  shunt system needs to be externalized    The rationale for this was explained in detail to Cristian Mitchell Giuseppe Javier and her mother Cheryle Barcelona and with her daughter Maynor Pitt  The CSF needs to be isolated away from the peritoneal cavity so that the abdomen has the best chance to heal     The risks, benefits and complications of surgery were explained in detail to Bertell Gilford and her mother Cheryle Barcelona and her daughter Maynor Pitt including hemorrhage, infection, CSF leakage, wound problems, pain, weakness, numbness, dysesthesiae, paralysis, coma, and death  Also, the possibility of further surgery being required was emphasized       Other potential medical complications were outlined, including deep venous thrombosis, pulmonary embolism, pneumonia, urinary tract infection, myocardial infarction,  and stroke  The need for physical therapy, occupational therapy, and rehabilitation was also mentioned  Thealternatives to surgery were discussed  Humble Saleem and a mother Fabby Wharton and her daughter Cass Jimenez asked relevant questions and asked that we proceed with arrangements for surgery  She and her mother signed consent    Procedure and Technique and Operative Findings: At surgery the pseudo sheath around the right  shunt in the up right anterior chest wall in second intercostal space was identified and was intact intact and was divided  The distal end of the peritoneal catheter was then withdrawn the through the divided tract section bisection until the whole of the peritoneal tubing was withdrawn from the incision  The tip of the peritoneal catheter was cut and sent for culture    At the divided edge of the distal tubing clear CSF was coming down the tubing    The tubing was divided further leaving lower 30 cm redundant end distally which was discarded    A Pudenz straight connector was attached to this and this was then connected up to a 25 cm length of Bactiseal catheter with the ventricular holes cut off  The distal male end was secured with a two 0 silk tie    The Pudenz connector connecting the distal end of the externalized shunt and the new proximal end of the Bactiseal catheter was secured with two 0 silk ties also    Surgery was carried out with the patient supine  There was an unexpected finding:  As soon as the distal end of the catheter was removed from the pseudo sheath there was  brisk egress of dark yellow and orange peritoneal type fluid coming up through the divided pseudo sheath which extends all way down to the peritoneal cavity  Initially this fluid appeared to be welling up in the rostral clavicular end of the incision      3-4 cc of this fluid was able to be aspirated into a syringe and placed in a sterile urine cup for culture a aerobic anaerobic and fungal    Still a large amount of fluid was welling up  I would say at least two ABD pads equivalent was soaked up over10-15 minutes    I asked anesthesia to put the table into some head up reverse Trendelenburg  This slow down fluid coming up  Eventually it was just a trickle    I isolated the distal end of the pseudo sheath and secured the end with two separately tied off 40 nylon sutures  Needed permanent tie off    There was no further drainage into the incision  Incision was closed with interrupted three 0 Vicryl  And then with interrupted four 0 Vicryl    The externalized catheter was brought out the right edge of the incision  And secured with a silicone button with four 0 nylon sutures also    The distal catheter was looped and secured with Steri-Strips so that it would remain protected in undisturbed well for the ex lap procedure was carried out inferiorly    The distal end of the tubing had been capped off  Cap was removed and the [place]'s closed drainage system attached and CSF was noted to drain slowly down the tubing  A CSF surveillance panel was also obtained from the distal end of the divided peritoneal tubing after accessing this and with a 18 gauge blunt needle    Culturette microbiology swabs for aerobic and anaerobic and fungal or also obtain from the welling up fluid in the incision at its or also obtained    Complications:   Procedure was complicated by a general surgery problem: the peritoneal fluid welling up through the pseudo sheath        This implied considerable loculation and tension with within the peritoneal cavity    Procedure and Technique:  As described above    The preliminary results on the CSF via divided peritoneal tubing showed:  CSF WBC of 1 CSF glucose of 55 and CSF protein of 17 CSF RBC of nine    I discussed the unusual finding and upward migration of abnormal peritoneal fluid through the pseudo  from the abdomen with   Sedrick Brought  and with Dr Smiley Saunders from the Trauma surgery in the room with me     I was present for the entire procedure    Patient Disposition:  Critical Care Unit    SIGNATURE: Lluvia Caballero MD  DATE: January 25, 2018  TIME: 6:13 PM

## 2018-01-25 NOTE — PROGRESS NOTES
Pt   Received from pacu into ICU 10 on vent support, post stent insertions , tpn in progress via right picc line, bladder scaned for 435ml  MD notified , pt awake and able to shake head yes and no to questions asked  daughter and mom at bedside  and reviewed  ICU guidelines, md at bedside and updates given to   family on plan of care, VSS at this time

## 2018-01-25 NOTE — NUTRITION
Replete Mg  Rec STD TPN Day # 1  Day # 2 Rec 580 ml ( 15 % AA),500 (D50W ), 250 ml (20 % lipids) to provide QD; 1330 ml, 1698 Total Kcal, 1350 NPC,  87 gm PRO, 250 gm CHO, 50 gm FAT, 14 gm N, 96:1 (NPC:N), 2 4 mg CHO/kg/min

## 2018-01-25 NOTE — MEDICAL STUDENT
Progress Note - Critical Care   Akila Perez 36 y o  female MRN: [id number]  Unit/Bed#: ICU 10 Encounter: [id number]    HPI/24hr events: Pt is a 37 y/o F who is s/p laparoscopic sleeve gastrectomy (), hiatal hernia repair (18), and  shunt placement for idiopathic intracranial hypertension by Dr Jairo Ramírez in May of 2017 to replace an LP shunt which had been placed in 2015 at [facility]   She was directly admitted to the ICU yesterday after esophageal stent placement by GI  She originally presented to the ED at [facility] on  and was found to have peritonitis and septic shock and underwent an emergent ex-lap  She was found to have a gastric perforation that was repaired at that time  Multiple drains were placed and she was put on broad spectrum abx  On POD9, she was found to have a fluid collection in the pelvis which was subsequently drained and cultured  An upper GI series showed leakage around the GE junction which prompted her transfer to [facility] for esophageal stenting  Neuro: GCS of 15   1   shunt: terminal end is in the abdomen, and thus is most likely infected  -neurosurgery consult : CT head, shunt survey, and externalization of the shunt all planned for this morning, held heparin at 0600 in preparation for externalization procedure  2  Analgesia: Dilaudid 1 mg q 2h (given @ 0021, 5349 and 0636)                 CV:   1  3/4 systolic murmur: E[facility] obtained [facility], in process of obtaining records  2  Access: PICC line at right basilic for 4 days                  Lun  S/p extubation and not using nasal cannula at present: pulmonary toilet and incentive spirometry                  GI: Active bowel sounds, significant tenderness to palpation in  right and left upper quadrants   1   POD 1 esophageal stenting at GE junction   -NPO   -protonix: 40 mg IV q12h    -percutaneous drain in lower abdomen - had purulent drainage last night : f/u culture results from [facility] and consider repeat cx  -ADENIKE drain in left abd with little drainage over night   2  Acute peritonitis:  -obtain CT chest abd and pelvis this a m  to define anatomy and placement of drains   -serial abd exams  -continue broad spectrum abx                 FEN:  1  Fluids: multi-electrolyte IV solution 100 mL/hr  2  Electrolytes: Magnesium 1 5 (from 2 1 yesterday) consider repletion   3  Nutrition:  -NPO since last night  -will restart TPN tomorrow                 :   1  ALESHIA upon initial presentation on    -Cr currently ( 44) : continue to trend BUN and Cr   -currently making good urine output without a lopez, continue to trend  -continue fluid resuscitation                  ID: WBC: 15 42 (from 14 62 yesterday)  1  Acute peritonitis :  -continue vancomycin (1000 mg IV q 12 h ) , zosyn (3,375 g IV q 6 h), micofungin (100 mg q 24 h)   -ID consult  -trend WBCs and temps                  Heme:   -no active issues                  Endo:  -monitor blood glucose and begin sliding scale insulin if < 180                            Msk/Skin:  -multiple laparoscopy port sites  -right lower abdomen percutaneous drain and ADENIKE drain in left upper quadrant                 Disposition: ICU care         Vitals:    18 0320 18 0420 18 0520 18 0620   BP: 118/70 134/76 119/66 140/65   BP Location:       Pulse: 86 86 86 88   Resp: 12 (!) 29 14 14   Temp:  97 8 °F (36 6 °C)     TempSrc:  Oral     SpO2: 100% 100% 96% 96%   Weight:       Height:                 Temperature:   Temp (24hrs), Av 1 °F (36 7 °C), Min:97 8 °F (36 6 °C), Max:98 6 °F (37 °C)    Current: Temperature: 97 8 °F (36 6 °C)    Weights:   IBW: 54 7 kg    Body mass index is 26 83 kg/m²    Weight (last 2 days)     Date/Time   Weight    18 1823  70 9 (156 31)              Hemodynamic Monitoring:  Non-Invasive/Invasive Ventilation Settings:  Respiratory    Lab Data (Last 4 hours)    None         O2/Vent Data (Last 4 hours) None              No results found for: PHART, ATI0FUJ, PO2ART, UYD2ZCQ, S6ONABEC, BEART, SOURCE      Intake and Outputs:  I/O       01/23 0701 - 01/24 0700 01/24 0701 - 01/25 0700    I V  (mL/kg)  1135 (16)    IV Piggyback  100    Total Intake(mL/kg)  1235 (17 4)    Urine (mL/kg/hr)  900    Drains  760    Total Output   1660    Net   -425                Nutrition:        Diet Orders            Start     Ordered    01/24/18 1852  Diet NPO  Diet effective now     Question Answer Comment   Diet Type NPO    RD to adjust diet per protocol? No        01/24/18 1904            Labs:     Results from last 7 days  Lab Units 01/25/18 0427 01/24/18 1954   WBC Thousand/uL 14 61* 15 42*   HEMOGLOBIN g/dL 10 7* 10 3*   HEMATOCRIT % 33 2* 37 8   PLATELETS Thousands/uL 572* 507*   MONO PCT MAN %  --  5      Results from last 7 days  Lab Units 01/25/18 0427 01/24/18  2244   SODIUM mmol/L 138 137   POTASSIUM mmol/L 4 9 4 9   CHLORIDE mmol/L 102 102   CO2 mmol/L 32 31   BUN mg/dL 22 21   CREATININE mg/dL 0 44* 0 37*   CALCIUM mg/dL 10 3* 10 2*   TOTAL PROTEIN g/dL  --  5 3*   BILIRUBIN TOTAL mg/dL  --  0 91   ALK PHOS U/L  --  177*   ALT U/L  --  61   AST U/L  --  28   GLUCOSE RANDOM mg/dL 110 109       Results from last 7 days  Lab Units 01/25/18 0427 01/24/18 1954   MAGNESIUM mg/dL 1 5* 2 1       Results from last 7 days  Lab Units 01/25/18 0427   PHOSPHORUS mg/dL 4 1        Results from last 7 days  Lab Units 01/24/18 1954   INR  1 26*   PTT seconds 28       Results from last 7 days  Lab Units 01/24/18 1954   LACTIC ACID mmol/L 1 5     No results found for: TROPONINI      Micro:  No results found for: Ortega Diez, [place], SPUTUMCULTUR    Allergies:    Allergies   Allergen Reactions    Benadryl [Diphenhydramine] Swelling    Phenergan [Promethazine] Hives       Medications:   Scheduled Meds:  chlorhexidine 15 mL [employer]ish [employer] Spit Q12H [address]   micafungin 100 mg Intravenous Q24H   pantoprazole 40 mg Intravenous Q12H [address] piperacillin-tazobactam 3 375 g Intravenous Q6H   vancomycin 15 mg/kg Intravenous Q12H     Continuous Infusions:  multi-electrolyte 100 mL/hr Last Rate: 100 mL/hr (01/24/18 1915)     PRN Meds:    HYDROmorphone 1 mg Q2H PRN       VTE Pharmacologic Prophylaxis: heparin was d/ida at 0600 in preparation for externalization procedure this a m  VTE Mechanical Prophylaxis: sequential compression device    Invasive lines and devices: Invasive Devices     Peripherally Inserted Central Catheter Line            PICC Line 14/64/01 Right Basilic 4 days          Drain            Closed/Suction Drain Left Abdomen Bulb -- days    Open Drain Right Abdomen -- days                      Code Status: Level 1 - Full Code     Portions of the record may have been created with voice recognition software  Occasional wrong word or "sound a like" substitutions may have occurred due to the inherent limitations of voice recognition software  Read the chart carefully and recognize, using context, where substitutions have occurred       Pelon García

## 2018-01-25 NOTE — ANESTHESIA PREPROCEDURE EVALUATION
Review of Systems/Medical History  Patient summary reviewed        Cardiovascular  Negative cardio ROS    Pulmonary  Negative pulmonary ROS        GI/Hepatic  Negative GI/hepatic ROS          Negative  ROS        Endo/Other  Negative endo/other ROS      GYN  Negative gynecology ROS          Hematology  Negative hematology ROS      Musculoskeletal  Negative musculoskeletal ROS        Neurology  Negative neurology ROS     Comment: Pseudotumor Psychology   Negative psychology ROS              Physical Exam    Airway    Mallampati score: II  TM Distance: >3 FB  Neck ROM: full     Dental   No notable dental hx     Cardiovascular  Comment: Negative ROS,     Pulmonary      Other Findings        Anesthesia Plan  ASA Score- 3 Emergent    Anesthesia Type- general with ASA Monitors  Additional Monitors: arterial line  Airway Plan: ETT  Comment: Patient seen and examined  History reviewed  Patient to be done under general anesthesia with ETT and routine monitors  Francie  TAP block for post op pain control  Risks discussed with the patient  Consent obtained        Plan Factors-    Induction- intravenous  Postoperative Plan-     Informed Consent- Anesthetic plan and risks discussed with patient  I personally reviewed this patient with the CRNA  Discussed and agreed on the Anesthesia Plan with the CRNA  Karrie Nissen

## 2018-01-25 NOTE — H&P
History and Physical - Critical Care  Bertell Gilford 36 y o  female MRN: [id number]  Unit/Bed#: ICU 10 Encounter: [id number]     Reason for Admission / Chief Complaint:  Principal Problem:    Gastric leak  Active Problems:    Acute peritonitis ([address] )    Idiopathic intracranial hypertension    S/P  shunt         History of Present Illness:  Bertell Gilford is a 36 y o  female with a pmh of gastric sleeve in 2016, morbid obesity, IIH with  shunt last revised in 06/2107 who presents as direct admission to the ICU following esophageal stent placement by gastroenterology today  Patient has a history of gastric bypass in 2016 with hiatal hernia repair 01/11  She presented to ED at an OSH on 01/14 and was found to have peritonitis and septic shock  She had an emergent ex-laparoscopy and was found to have gastric perforation that required repair at that time  She had multiple drains placed at that time and was out on broad spectrum antibiotics  She was found to have fluid collection in the pelvis on POD 9  A perc  Drain was placed and cultured sent  Upper GI series showed leakage around the GE junction and she subsequently transferred to [facility] for esophageal stenting with Dr Sara Medina  Patient course:  01/11 Hiatal hernia repair  01/14 Septic shock/peritonitis; ex-laparoscopy with repair of gastric perf  POD 5 no leak on upper GI series  POD 8 CT abd/pelvis iv and po contrast;  leak at GI jxn   POD 9 Perc drain placed with ~150cc from RUQ and pelvic fluid collection  ADENIKE drains placed at GE junction    -Blood, CSF, left subclavian tip Cxs all negative; awaiting peritoneal Cx as of 01/23     History obtained from chart review  Past Medical History:  No past medical history on file  Past Surgical History:  Hysterectomy   shunt revision 06/2017  Sleeve gastrectomy 12/2016     Past Family History:  No family history on file       Social History:  History   Smoking Status    Not on file   Smokeless Tobacco    Not on file     History   Alcohol use Not on file     History   Drug use: Unknown     Marital Status: Single     Medications:  Current Facility-Administered Medications   Medication Dose Route Frequency    Adult 3-in-1 TPN (standard base / standard electrolytes)   Intravenous Continuous    chlorhexidine (PERIDEX) 0 12 % oral rinse 15 mL  15 mL Swish & Spit Q12H [facility]    heparin (porcine) subcutaneous injection 5,000 Units  5,000 Units Subcutaneous Q8H [facility]    HYDROmorphone (DILAUDID) injection 1 mg  1 mg Intravenous Q2H PRN    micafungin (MYCAMINE) 100 mg in sodium chloride 0 9 % 100 mL IVPB  100 mg Intravenous Q24H    multi-electrolyte (ISOLYTE-S PH 7 4 equivalent) IV solution  100 mL/hr Intravenous Continuous    pantoprazole (PROTONIX) injection 40 mg  40 mg Intravenous Q12H [facility]    piperacillin-tazobactam (ZOSYN) 3 375 g in sodium chloride 0 9 % 50 mL IVPB  3 375 g Intravenous Q6H    vancomycin (VANCOCIN) IVPB (premix) 1,000 mg  15 mg/kg Intravenous Q12H     Home medications:  Prior to Admission medications    Not on File     Allergies: Allergies   Allergen Reactions    Benadryl [Diphenhydramine] Swelling    Phenergan [Promethazine] Hives        ROS:   Review of Systems   Constitutional: Negative for appetite change, chills, diaphoresis, fatigue and fever  HENT: Negative for congestion, rhinorrhea and sore throat  Respiratory: Positive for cough  Negative for shortness of breath, wheezing and stridor  Cardiovascular: Negative for chest pain, palpitations and leg swelling  Gastrointestinal: Positive for abdominal distention and abdominal pain  Negative for constipation, diarrhea, nausea and vomiting  Endocrine: Negative for polydipsia and polyuria  Genitourinary: Negative for difficulty urinating, dysuria and hematuria  Musculoskeletal: Negative for back pain, neck pain and neck stiffness  Skin: Negative for pallor, rash and wound     Neurological: Negative for dizziness, light-headedness and headaches  Psychiatric/Behavioral: Negative for behavioral problems and confusion  Vitals:  Vitals:    18 1823   BP: 130/69   BP Location: Left arm   Pulse: 94   Resp: 17   Temp: 98 6 °F (37 °C)   TempSrc: Rectal   SpO2: 100%   Weight: 70 9 kg (156 lb 4 9 oz)   Height: 5' 4" (1 626 m)     Temperature:   Temp (24hrs), Av 6 °F (37 °C), Min:98 6 °F (37 °C), Max:98 6 °F (37 °C)    Current: Temperature: 98 6 °F (37 °C)     Weights:   IBW: 54 7 kg  Body mass index is 26 83 kg/m²  Hemodynamic Monitoring:  N/A     Non-Invasive/Invasive Ventilation Settings:  Respiratory    Lab Data (Last 4 hours)    None         O2/Vent Data (Last 4 hours)    None              No results found for: PHART, EOS6VGE, PO2ART, DQN8NNB, M5XOPEHZ, BEART, SOURCE  SpO2: SpO2: 100 %     Physical Exam:  Physical Exam   Constitutional: She is oriented to person, place, and time  She appears well-developed and well-nourished  She appears distressed  HENT:   Head: Normocephalic and atraumatic  Eyes: Conjunctivae are normal  No scleral icterus  Cardiovascular: Normal rate, regular rhythm and intact distal pulses  Murmur (iii/vi systolic murmur) heard  Pulmonary/Chest: Effort normal  No respiratory distress  She has no wheezes  She has rales (diffuse upper lung fields)  She exhibits no tenderness  Abdominal: Soft  There is tenderness (   diffuse, lower abdominal tender to light palpation)  Multiple laprascopic ports wounds at varying stages of healing  All c/d/i  Perc IR drain at left lower abdomen  Draining tan, purulent appearing fluid  2 ADENIKE drains exiting from LUQ  Both with minimal serosanguinous drainage  Musculoskeletal: She exhibits edema (scant edema RUE forearm)  Neurological: She is alert and oriented to person, place, and time  GCS eye subscore is 4  GCS verbal subscore is 5  GCS motor subscore is 6  Skin: Skin is warm and dry  She is not diaphoretic  Psychiatric: She has a normal mood and affect  Her behavior is normal    Nursing note and vitals reviewed  Labs:    Results from last 7 days  Lab Units 01/24/18 1954   WBC Thousand/uL 15 42*   HEMOGLOBIN g/dL 10 3*   HEMATOCRIT % 37 8   PLATELETS Thousands/uL 507*   MONO PCT MAN % 5          Invalid input(s): LABALBU    Results from last 7 days  Lab Units 01/24/18 1954   MAGNESIUM mg/dL 2 1            Results from last 7 days  Lab Units 01/24/18 1954   INR  1 26*   PTT seconds 28       Results from last 7 days  Lab Units 01/24/18 1954   LACTIC ACID mmol/L 1 5     No results found for: TROPONINI     Imaging:  I have personally reviewed pertinent films in PACS   Micro:  No results found for: Geovanna Garcia, SPUTUMCULTUR    Assessment:   Principal Problem:    Gastric leak  Active Problems:    Acute peritonitis ([address] )    Idiopathic intracranial hypertension    S/P  shunt    Plan:                  Neuro:   - shunt with last revision in 06/2107  The terminal end of shunt is in abdomen that is likely infectious  -Luan Hummel consulted  Plan to obtain CT head and shunt series in am  with plan to remove shunt from abdomen   -Analgesia: Dilaudid 1mg q2h  -CAM-ICU                CV:   -iii/vi systolic murmur  ECHO obtained at OSH, will attempt to obtain records  Patient hemodynamically stable with stable vital signs  Lung:   -S/p extubation; pulmonary toilet, encourage incentive spirometer                 GI:   -s/p POD 0 for esophageal stenting at GE junction  -NPO  -GI Ppx: Protonix 40mg IV bid  -Perc IR drain in lower abdomen with purulent drainage  F/U with Cx results from OSH; consider repeat cultures  -ADENIKE drains in LUQ with minimal drainage at this time  FEN:   -NPO s/p esophageal stent   -TPN; was receiving at OSH; will restart tomorrow                :   -ALESHIA during initial presentation on 01/14  Trend BUN/Cr; Trend UOP  No lopez   Patient making good urine                 ID:   -Acute peritonitis; Vanc/zosyn/micofungin   -F/U culture results from OSH   -ID consult  -Trend WBC/temps; Heme:   -No active issues                 Endo:   -Trend Bg  Start SSI if hyperglucemia >180                 Msk/Skin:   -Multiple laparoscopy port sites c/d/i  Lower abdominal perc drain and x2 ADENIKE drains in LUQ;  -Frequent offloading                 Disposition: ICU care     VTE Pharmacologic Prophylaxis: Heparin  VTE Mechanical Prophylaxis: sequential compression device     Invasive lines and devices: Invasive Devices     Peripherally Inserted Central Catheter Line            PICC Line 32/41/06 Right Basilic 3 days          Drain            Closed/Suction Drain Left Abdomen Bulb -- days    Open Drain Right Abdomen -- days                 Code Status: Level 1 - Full Code  POA:    POLST:       Given critical illness, patient length of stay will require greater than two midnights  Counseling / Coordination of Care  Total time spent today 30 minutes  Greater than 50% of total time was spent with the patient and / or family counseling and / or coordination of care  A description of the counseling / coordination of care: [address]     Portions of the record may have been created with voice recognition software  Occasional wrong word or "sound a like" substitutions may have occurred due to the inherent limitations of voice recognition software  Read the chart carefully and recognize, using context, where substitutions have occurred          Drew Osorio MD

## 2018-01-25 NOTE — PROGRESS NOTES
Progress Note - Critical Care   Akila Perez 36 y o  female MRN: [id number]  Unit/Bed#: ICU 10 Encounter: [id number]    Assessment:   Principal Problem:    Gastric leak  Active Problems:    Acute peritonitis ([address] )    Idiopathic intracranial hypertension    S/P  shunt    Plan:               Neuro:   - shunt with last revision in 06/2107  The terminal end of shunt is in abdomen that is likely infectious  -Luan Hummel consulted  Plan to obtain CT head and shunt series in am            with plan to remove shunt from abdomen   -Analgesia: Dilaudid 1mg q2h (x5)  -CAM-ICU                 CV:   -iii/vi systolic murmur  ECHO obtained at OSH, will attempt to obtain records  Patient hemodynamically stable with stable vital signs                   Lung:   -S/p extubation; pulmonary toilet, encourage incentive spirometer                 GI:   -s/p POD 0 for esophageal stenting at GE junction  -NPO  -GI Ppx: Protonix 40mg IV bid  -Perc IR drain in lower abdomen with purulent drainage  F/U with Cx results from OSH; consider repeat cultures  -ADENIKE drains in LUQ with minimal drainage at this time   -Will obtain CT chest/abdomen/pelvis;surgery following,willawait recs  Likely return to OR                 FEN:   -NPO s/p esophageal stent   -TPN; was receiving at OSH; will restart tomorrow                 :   -ALESHIA during initial presentation on 01/14  Trend BUN/Cr; Trend UOP  No lopez  Patient making good urine                 ID:   -Acute peritonitis; Vanc/zosyn/micofungin              -F/U culture results from OSH              -ID consult  -Trend WBC/temps;                  Heme:   -No active issues                 Endo:   -Trend Bg  Start SSI if hyperglucemia >180                 Msk/Skin:   -Multiple laparoscopy port sites c/d/i   Lower abdominal perc drain and x2 ADENIKE drains in LUQ;  -Frequent offloading                 Disposition: ICU care likely OR today for shunt revision/ex-lap    Chief Complaint: abdominal pain    HPI/24hr events: No acute events overnight  Physical Exam:   Physical Exam   Constitutional: She is oriented to person, place, and time  She appears well-developed and well-nourished  No distress  HENT:   Head: Normocephalic and atraumatic  Eyes: Conjunctivae are normal  No scleral icterus  Neck: Neck supple  No JVD present  Cardiovascular: Normal rate, regular rhythm, normal heart sounds and intact distal pulses  No murmur heard  Pulmonary/Chest: Effort normal and breath sounds normal  No stridor  No respiratory distress  She has no wheezes  She has no rales  Abdominal: Soft  Bowel sounds are normal  She exhibits no distension and no mass  There is tenderness (lower abdominal tenderness to palpation)  There is no guarding  Perc drain in lower abdomen  Draining purulent fluid   Musculoskeletal: She exhibits no edema, tenderness or deformity  Neurological: She is alert and oriented to person, place, and time  Skin: Skin is warm and dry  Capillary refill takes less than 2 seconds  No rash noted  She is not diaphoretic  No erythema  Psychiatric: She has a normal mood and affect  Her behavior is normal    Nursing note and vitals reviewed  Vitals:    18 0320 18 0420 18 0520 18 0620   BP: 118/70 134/76 119/66 140/65   BP Location:       Pulse: 86 86 86 88   Resp: 12 (!) 29 14 14   Temp:  97 8 °F (36 6 °C)     TempSrc:  Oral     SpO2: 100% 100% 96% 96%   Weight:       Height:                 Temperature:   Temp (24hrs), Av 1 °F (36 7 °C), Min:97 8 °F (36 6 °C), Max:98 6 °F (37 °C)    Current: Temperature: 97 8 °F (36 6 °C)    Weights:   IBW: 54 7 kg    Body mass index is 26 83 kg/m²    Weight (last 2 days)     Date/Time   Weight    18 1823  70 9 (156 31)              Hemodynamic Monitoring:  N/A     Non-Invasive/Invasive Ventilation Settings:  Respiratory    Lab Data (Last 4 hours)    None         O2/Vent Data (Last 4 hours)    None              No results found for: PHART, WER9OSU, PO2ART, WMR2FHQ, A7ZTGRZK, BEART, SOURCE  SpO2: SpO2: 96 %    Intake and Outputs:  I/O       01/23 0701 - 01/24 0700 01/24 0701 - 01/25 0700    I V  (mL/kg)  1135 (16)    IV Piggyback  100    Total Intake(mL/kg)  1235 (17 4)    Urine (mL/kg/hr)  900    Drains  760    Total Output   1660    Net   -425              UOP: /hour   Nutrition:        Diet Orders            Start     Ordered    01/24/18 1852  Diet NPO  Diet effective now     Question Answer Comment   Diet Type NPO    RD to adjust diet per protocol? No        01/24/18 1904          Labs:     Results from last 7 days  Lab Units 01/25/18 0427 01/24/18 1954   WBC Thousand/uL 14 61* 15 42*   HEMOGLOBIN g/dL 10 7* 10 3*   HEMATOCRIT % 33 2* 37 8   PLATELETS Thousands/uL 572* 507*   MONO PCT MAN %  --  5      Results from last 7 days  Lab Units 01/25/18 0427 01/24/18  2244   SODIUM mmol/L 138 137   POTASSIUM mmol/L 4 9 4 9   CHLORIDE mmol/L 102 102   CO2 mmol/L 32 31   BUN mg/dL 22 21   CREATININE mg/dL 0 44* 0 37*   CALCIUM mg/dL 10 3* 10 2*   TOTAL PROTEIN g/dL  --  5 3*   BILIRUBIN TOTAL mg/dL  --  0 91   ALK PHOS U/L  --  177*   ALT U/L  --  61   AST U/L  --  28   GLUCOSE RANDOM mg/dL 110 109       Results from last 7 days  Lab Units 01/25/18 0427 01/24/18 1954   MAGNESIUM mg/dL 1 5* 2 1       Results from last 7 days  Lab Units 01/25/18 0427   PHOSPHORUS mg/dL 4 1        Results from last 7 days  Lab Units 01/24/18 1954   INR  1 26*   PTT seconds 28       Results from last 7 days  Lab Units 01/24/18 1954   LACTIC ACID mmol/L 1 5     No results found for: TROPONINI    Imaging:  I have personally reviewed pertinent films in PACS      Micro:  No results found for: Morgan Sarna, SPUTUMCULTUR    Allergies:    Allergies   Allergen Reactions    Benadryl [Diphenhydramine] Swelling    Phenergan [Promethazine] Hives       Medications:   Scheduled Meds:  chlorhexidine 15 mL Swish & Spit Q12H [facility]   micafungin 100 mg Intravenous Q24H   pantoprazole 40 mg Intravenous Q12H [address]   piperacillin-tazobactam 3 375 g Intravenous Q6H   vancomycin 15 mg/kg Intravenous Q12H     Continuous Infusions:  multi-electrolyte 100 mL/hr Last Rate: 100 mL/hr (01/24/18 1915)     PRN Meds:    HYDROmorphone 1 mg Q2H PRN       VTE Pharmacologic Prophylaxis: Heparin  VTE Mechanical Prophylaxis: sequential compression device    Invasive lines and devices: Invasive Devices     Peripherally Inserted Central Catheter Line            PICC Line 45/15/76 Right Basilic 4 days          Drain            Closed/Suction Drain Left Abdomen Bulb -- days    Open Drain Right Abdomen -- days                   Counseling / Coordination of Care  Total time spent today 30 minutes  Greater than 50% of total time was spent with the patient and / or family counseling and / or coordination of care  A description of the counseling / coordination of care: 45     Code Status: Level 1 - Full Code     Portions of the record may have been created with voice recognition software  Occasional wrong word or "sound a like" substitutions may have occurred due to the inherent limitations of voice recognition software  Read the chart carefully and recognize, using context, where substitutions have occurred       Zee Menjivar MD

## 2018-01-25 NOTE — CASE MANAGEMENT
Initial Clinical Review    Admission: Date/Time/Statement: 1/24/18 @ 1811     Orders Placed This Encounter   Procedures    Inpatient Admission     Standing Status:   Standing     Number of Occurrences:   1     Order Specific Question:   Admitting Physician     Answer:   Cornelia Lagunas     Order Specific Question:   Level of Care     Answer:   Critical Care [15]     Order Specific Question:   Estimated length of stay     Answer:   More than 2 Midnights     Order Specific Question:   Certification     Answer:   I certify that inpatient services are medically necessary for this patient for a duration of greater than two midnights  See H&P and MD Progress Notes for additional information about the patient's course of treatment  History of Illness:     Edilberto Joseph is a 36 y o  female with a pmh of gastric sleeve in 2016, morbid obesity, IIH with  shunt last revised in 06/2107 who presents as direct admission to the ICU following esophageal stent placement by gastroenterology today  Patient has a history of gastric bypass in 2016 with hiatal hernia repair 01/11  She presented to ED at [facility]   on 01/14 and was found to have peritonitis and septic shock  She had an emergent ex-laparoscopy and was found to have gastric perforation that required repair at that time  She had multiple drains placed at that time and was out on broad spectrum antibiotics  She was found to have fluid collection in the pelvis on POD 9  A perc  Drain was placed and cultured sent  Upper GI series showed leakage around the GE junction and she subsequently transferred to [facility] for esophageal stenting with Dr Avinash Sharma             ED Vital Signs:   ED Triage Vitals   Temperature Pulse Respirations Blood Pressure SpO2   01/24/18 1823 01/24/18 1823 01/24/18 1823 01/24/18 1823 01/24/18 1823   98 6 °F (37 °C) 94 17 130/69 100 %      Temp Source Heart Rate Source Patient Position - Orthostatic VS BP Location FiO2 (%) 01/24/18 1823 01/24/18 1823 01/24/18 1823 01/24/18 1823 --   Rectal Monitor Lying Left arm       Pain Score       01/24/18 2016       Worst Possible Pain        Wt Readings from Last 1 Encounters:   01/24/18 70 9 kg (156 lb 4 9 oz)       Abnormal Labs/Diagnostic Test Results:     Lab Units 01/24/18 1954   WBC Thousand/uL 15 42*   HEMOGLOBIN g/dL 10 3*   PLATELETS Thousands/uL 507*     CT  CHEST ABDOMEN AND PELVIS   IMPRESSION:     Extensive perihepatic subcapsular abscess which is in communication with a right paracolic gutter abscess  Drainage catheter within the left subhepatic space does not appear to communicate with this abscess      Large perisplenic abscess with mass effect on the spleen  Peripheral hypodensities within the spleen likely represent splenic infarcts  Infectious involvement less likely but not excluded      Large left paracolic gutter abscess      Mesenteric abscess as described      Pelvic abscess as described, pelvic drainage catheter is anterior to the abscess        Patient is status post placement of esophageal stent as well as gastric sleeve procedure  No evidence of extravasated oral contrast is identified on this exam      Dependent lower lobe atelectasis and small bilateral pleural effusions      Extensive subcutaneous edema suggesting anasarca      Soft tissue gas identified within the left axillary soft tissues, correlate for a potential iatrogenic etiology  If this does not exist, an infectious process related to a gas-forming organism is should be excluded          Past Medical/Surgical History:       No Additional Past Medical History       Admitting Diagnosis: Gastric leak [K91 89]    Age/Sex: 36 y o  female      Assessment/Plan:     Procedure Summary     Date: 01/24/18   Anesthesia Start:     Procedure: INSERTION STENT ESOPHAGEAL   CANCELLED      Esophageal stent as well as gastric sleeve procedure  DONE AT [facility]     SURGERY  Assessment:   For through old female with  shunt in likely infected at after gastric perforation, operation, and GE junction leak with esophageal stent placement at outside hospital      Plan:  - follow-up CT head and  shunt series to determine if shunt to be externalized  - esophageal stenting in GE junction, continue Protonix NPO, continue drains  - continue TPN which was started in outside hospital  - acute kidney injury present on admission, continue fluid resuscitation  - continue broad-spectrum antibiotics and follow up with infectious disease    CRITICAL CARE   Principal Problem:    Gastric leak  Active Problems:    Acute peritonitis ([address] )    Idiopathic intracranial hypertension    S/P  shunt     Plan:                  Neuro:   - shunt with last revision in 06/2107  The terminal end of shunt is in abdomen that is likely infectious  -Luan Hummel consulted  Plan to obtain CT head and shunt series in am with plan to remove shunt from abdomen   -Analgesia: Dilaudid 1mg q2h  -CAM-ICU                 CV:   -iii/vi systolic murmur  ECHO obtained at OSH, will attempt to obtain records  Patient hemodynamically stable with stable vital signs                   Lung:   -S/p extubation; pulmonary toilet, encourage incentive spirometer                 GI:   -s/p POD 0 for esophageal stenting at GE junction  -NPO  -GI Ppx: Protonix 40mg IV bid  -Perc IR drain in lower abdomen with purulent drainage  F/U with Cx results from OSH; consider repeat cultures  -ADENIKE drains in LUQ with minimal drainage at this time                  FEN:   -NPO s/p esophageal stent   -TPN; was receiving at OSH; will restart tomorrow                 :   -ALESHIA during initial presentation on 01/14  Trend BUN/Cr; Trend UOP  No lopez   Patient making good urine                 ID:   -Acute peritonitis; Vanc/zosyn/micofungin              -F/U culture results from OSH              -ID consult  -Trend WBC/temps;                  Heme:   -No active issues                 Endo: -Trend Bg  Start SSI if hyperglucemia >180                 Msk/Skin:   -Multiple laparoscopy port sites c/d/i   Lower abdominal perc drain and x2 ADENIKE drains in LUQ;  -Frequent offloading                 Disposition: ICU care      Admission Orders:    Drain      Closed/Suction Drain Left Abdomen Bulb     Open Drain Right Abdomen     VANCOMYCIN TROUGH  CBC AND DIFF   CT  CHEST ABDOMEN AND PELVIS   NPO  SEQUENTIAL COMPRESSION DEVICE  CONSULT NEURO SURGERY  VPVP (ventriculoperitoneal) shunt status [z98 2]   CONSULT  INFECTIOUS DISEASE   Peritonitis ([facility] [address] ) [K65 9]     Scheduled Meds:   chlorhexidine 15 mL Swish & Spit Q12H [address]   magnesium sulfate 2 g Intravenous Once   micafungin 100 mg Intravenous Q24H   pantoprazole 40 mg Intravenous Q12H [address]   piperacillin-tazobactam 3 375 g Intravenous Q6H   vancomycin 15 mg/kg Intravenous Q12H     Continuous Infusions:   Adult TPN (CUSTOM BASE/CUSTOM ELECTROLYTE)     multi-electrolyte 100 mL/hr Last Rate: 100 mL/hr (01/25/18 0921)     PRN Meds: HYDROmorphone

## 2018-01-25 NOTE — CONSULTS
Consultation - Infectious Disease   Zoila Ballard 36 y o  female MRN: [id number]  Unit/Bed#: OR POOL Encounter: [id number]      IMPRESSION & RECOMMENDATIONS:   Impression/Recommendations: This is a 36 y o  female, status post gastric sleeve,  shunt revision and hiatal hernia repair, admitted [facility] on 01/14 with septic shock secondary to gastric perforation  She is status post ex lap and repair, complicated by intra-abdominal/pelvic abscess and GE junction leakage  Patient was transferred to [facility] yesterday and is now status post placement of esophageal stent  She is scheduled to undergo externalization of  shunt later today  1   Intra-abdominal/pelvic abscesses  Patient has 2 drains in place  Per CT, it appears that there are still collections that are not being drained  Patient may need these additional collections drained also  No microbiology results came with patient from [facility]   For now, I will continue her current antibiotic regimen, pending August obtaining these microbiology records  Continue vancomycin/Zosyn  Will try to obtain culture results from [facility]   Consider additional drainage of intra-abdominal collections  2   Possible infected  shunt  Given presence of tip of  shunt in peritoneal space, there is high probability of  shunt infection  Fortunately, patient has no symptoms concerning for meningitis  She has neurological deficits  She is status post tapping of  shunt at [facility]   I have no results of this either  Patient is scheduled to undergo externalization of  shunt later today  I agree with this  Most likely, I would treat patient for presumptive  shunt infection/early meningitis  Antibiotic plan as in above  Will try to obtain CSF results from [facility]   Follow up on CSF obtained for surgery later today  3   Gastric perforation, status post repair    Patient has persistent GE junction leak  She is now status post placement of esophageal stent  Patient may need another upper GI down the line to assess for any persistent leak  4   Septic shock, on presentation at [facility]   This is secondary to gastric perforation  Patient is now hemodynamically stable  As far as I can tell from chart review, patient's blood cultures at [facility] were negative  Antibiotic plan as in above  Monitor hemodynamics  [facility] records reviewed in detail  Despite all persistent problems above, patient is clearly systemic improved from initial admission  Discussed with the patient and her mother in detail regarding above plan  Thank you for this consultation  We will follow along with you  HISTORY OF PRESENT ILLNESS:  Reason for Consult:  Intra-abdominal abscess  Possible infected  shunt  HPI: Nissa Parker is a 36 y o  female, with morbid obesity and  shunt in place, status post gastric sleeve in 2016, status post  shunt revision in June 2017, and status post hiatal hernia repair on 01/11/2018, came to [facility] on 01/14 with abdominal pain and fever  She was found to be in septic shock with peritonitis  Patient underwent emergent ex lap and was found to have gastric perforation  This was repaired  Drains were placed  Patient was admitted to ICU, on broad-spectrum antibiotic  Patient was subsequently found to have pelvic abscess  Another drain was placed  Upper GI series showed leakage around GE junction  For these reasons, [facility]  This was done last night  Due to presence of  shunt, there was concern of secondary  shunt infection  Plan for  shunt externalization later today noted  At present, patient complains of abdominal pain  She has no headache  She has no fever/chills  She has no focal weakness      REVIEW OF SYSTEMS:  A complete 12 point system-based review of systems is otherwise negative  PAST MEDICAL HISTORY:  History reviewed  No pertinent past medical history  History reviewed  No pertinent surgical history  Problem list reviewed  FAMILY HISTORY:  Non-contributory    SOCIAL HISTORY:  History   Alcohol Use No     History   Drug Use No     History   Smoking Status    Never Smoker   Smokeless Tobacco    Never Used       ALLERGIES:  Allergies   Allergen Reactions    Benadryl [Diphenhydramine] Swelling    Phenergan [Promethazine] Hives       MEDICATIONS:  All current active medications have been reviewed  Patient is currently on vancomycin/Zosyn/micafungin  PHYSICAL EXAM:  Vitals:  HR:  [84-94] 86  Resp:  [9-29] 17  BP: (117-140)/(63-76) 137/66  SpO2:  [96 %-100 %] 98 %  Temp (24hrs), Av °F (36 7 °C), Min:97 1 °F (36 2 °C), Max:98 6 °F (37 °C)  Current: Temperature: (!) 97 1 °F (36 2 °C)     Physical Exam:  General:  Somewhat chronically ill-appearing, comfortable, nontoxic, in no acute distress  Awake, alert and oriented x 3  Eyes:  Conjunctive clear with no hemorrhages or effusions  Oropharynx:  No ulcers, no lesions, pharynx benign, no tonsillitis  Neck:  Supple, no lymphadenopathy, no mass, nontender, no meningismus  Lungs:  Expansion symmetric, no rales, no wheezing, no accessory muscle use  Cardiac:  Regular rate and rhythm, normal S1, normal S2, no murmurs  Abdomen:  Soft, mildly distended  Incision clean  Drains in place with seropurulent fluid  Mild incisional tenderness  No HSM  Extremities:  Trace edema, no erythema, nontender  No ulcers  Skin:  No rashes, no ulcers  Neurological:  Moves all four extremities spontaneously, sensation grossly intact    LABS, IMAGING, & OTHER STUDIES:  Lab Results:  I have personally reviewed pertinent labs      Results from last 7 days  Lab Units 18  0427 18  2244   SODIUM mmol/L 138 137   POTASSIUM mmol/L 4 9 4 9   CHLORIDE mmol/L 102 102   CO2 mmol/L 32 31   ANION GAP mmol/L 4 4   BUN mg/dL 22 21   CREATININE mg/dL 0 44* 0 37*   EGFR ml/min/1 73sq m 124 131   GLUCOSE RANDOM mg/dL 110 109   CALCIUM mg/dL 10 3* 10 2*   AST U/L  --  28   ALT U/L  --  61   ALK PHOS U/L  --  177*   TOTAL PROTEIN g/dL  --  5 3*   BILIRUBIN TOTAL mg/dL  --  0 91       Results from last 7 days  Lab Units 01/25/18  0427 01/24/18  1954   WBC Thousand/uL 14 61* 15 42*   HEMOGLOBIN g/dL 10 7* 10 3*   PLATELETS Thousands/uL 572* 507*           Imaging Studies:   I have personally reviewed pertinent imaging study reports and images in PACS  CXR reviewed personally  No infiltrates or consolidations  Chest/abdomen/pelvis CT reviewed personally  There is extensive perihepatic abscess with drain in place  There is a large perisplenic abscess also  There is pelvic abscess with drain in place  Head CT reviewed personally  No acute changes   shunt in place  EKG, Pathology, and Other Studies:   I have personally reviewed pertinent reports

## 2018-01-25 NOTE — H&P
H&P Exam - General Surgery   Ronna Hairston 36 y o  female MRN: [id number]  Unit/Bed#: ICU 10 Encounter: [id number]    Assessment/Plan     Assessment: For through old female with  shunt in likely infected at after gastric perforation, operation, and GE junction leak with esophageal stent placement at outside hospital     Plan:  - follow-up CT head and  shunt series to determine if shunt to be externalized  - esophageal stenting in GE junction, continue Protonix NPO, continue drains  - continue TPN which was started in outside hospital  - acute kidney injury present on admission, continue fluid resuscitation  - continue broad-spectrum antibiotics and follow up with infectious disease    Endy Qureshi MD PGY-4  7:07 AM  01/25/18          History of Present Illness     HPI:  Ronna Hairston is a 36 y o  female who presents with sepsis  The patient has a past history of gastric sleeve in 2016, morbid obesity, and a  shunt which was revised in June 2017  The patient was transferred into the ICU from an outside hospital today for esophageal stent placement  The patient hiatal hernia repaired on 01/11  She presented to the emergency department outside hospital on 01/14 was found at peritonitis and septic shock  She had emergence diagnostic laparoscopy and was found to have a gastric perforation that required repair  She had multiple drains placed was on broad-spectrum antibiotics  Postop day 9 showed a large fluid collection or pelvis which required percutaneous drain placement  An upper GI series showed a leak around the GE junction she was transferred [facility] for esophageal stenting by Dr Jesús Christianson  He arrived intubated to the ICU, however was promptly extubated  Review of Systems   Constitutional: Negative for fever  HENT: Negative for sore throat  Eyes: Negative for visual disturbance  Respiratory: Negative for shortness of breath      Cardiovascular: Negative for chest pain    Gastrointestinal: Positive for abdominal distention  Negative for nausea and vomiting  Endocrine: Negative for polyuria  Genitourinary: Negative for dysuria  Musculoskeletal: Negative for arthralgias  Skin: Negative for rash  Allergic/Immunologic: Negative for environmental allergies  Neurological: Negative for dizziness  Hematological: Negative for adenopathy  Psychiatric/Behavioral: The patient is not nervous/anxious  Historical Information   No past medical history on file  No past surgical history on file  Social History   History   Alcohol use Not on file     History   Drug use: Unknown     History   Smoking Status    Not on file   Smokeless Tobacco    Not on file     Family History: non-contributory    Meds/Allergies   all medications and allergies reviewed  Allergies not on file    Objective   First Vitals:   Blood Pressure: 130/69 (01/24/18 1823)  Pulse: 94 (01/24/18 1823)  Temperature: 98 6 °F (37 °C) (01/24/18 1823)  Temp Source: Rectal (01/24/18 1823)  Respirations: 17 (01/24/18 1823)  Height: 5' 4" (162 6 cm) (01/24/18 1823)  Weight - Scale: 70 9 kg (156 lb 4 9 oz) (01/24/18 1823)  SpO2: 100 % (01/24/18 1823)    Current Vitals:   Blood Pressure: 130/69 (01/24/18 1823)  Pulse: 94 (01/24/18 1823)  Temperature: 98 6 °F (37 °C) (01/24/18 1823)  Temp Source: Rectal (01/24/18 1823)  Respirations: 17 (01/24/18 1823)  Height: 5' 4" (162 6 cm) (01/24/18 1823)  Weight - Scale: 70 9 kg (156 lb 4 9 oz) (01/24/18 1823)  SpO2: 100 % (01/24/18 1823)      Intake/Output Summary (Last 24 hours) at 01/24/18 1933  Last data filed at 01/24/18 1901   Gross per 24 hour   Intake                0 ml   Output              750 ml   Net             -750 ml       Invasive Devices     Drain            Closed/Suction Drain Left Abdomen Bulb -- days    Open Drain Right Abdomen -- days                Physical Exam   Constitutional: She is oriented to person, place, and time   She appears well-developed and well-nourished  HENT:   Head: Normocephalic and atraumatic  Eyes: Pupils are equal, round, and reactive to light  Neck: Normal range of motion  No tracheal deviation present  Cardiovascular: Normal rate and regular rhythm  Pulmonary/Chest: No respiratory distress  She has no wheezes  Abdominal: Soft  She exhibits no distension  There is tenderness  Incisions c/d/i   Musculoskeletal: Normal range of motion  She exhibits no edema  Neurological: She is alert and oriented to person, place, and time  No cranial nerve deficit  Skin: Skin is warm  No rash noted  Psychiatric: She has a normal mood and affect         Lab Results:   CBC:   Lab Results   Component Value Date    WBC 14 61 (H) 01/25/2018    HGB 10 7 (L) 01/25/2018    HCT 33 2 (L) 01/25/2018    MCV 91 01/25/2018     (H) 01/25/2018    MCH 29 4 01/25/2018    MCHC 32 2 01/25/2018    RDW 14 2 01/25/2018    MPV 9 9 01/25/2018    NRBC 0 01/25/2018   , CMP:   Lab Results   Component Value Date     01/25/2018    K 4 9 01/25/2018     01/25/2018    CO2 32 01/25/2018    ANIONGAP 4 01/25/2018    BUN 22 01/25/2018    CREATININE 0 44 (L) 01/25/2018    GLUCOSE 110 01/25/2018    CALCIUM 10 3 (H) 01/25/2018    AST 28 01/24/2018    ALT 61 01/24/2018    ALKPHOS 177 (H) 01/24/2018    PROT 5 3 (L) 01/24/2018    BILITOT 0 91 01/24/2018    EGFR 124 01/25/2018   , Coagulation:   Lab Results   Component Value Date    INR 1 26 (H) 01/24/2018   , Urinalysis: No results found for: Toby Grit, SPECGRAV, PHUR, LEUKOCYTESUR, NITRITE, PROTEINUA, GLUCOSEU, KETONESU, BILIRUBINUR, BLOODU, Amylase: No results found for: AMYLASE, Lipase: No results found for: LIPASE  Imaging: I have personally reviewed pertinent films in PACS  EKG, Pathology, and Other Studies: I have personally reviewed pertinent films in PACS    Code Status: Level 1 - Full Code  Advance Directive and Living Will:      Power of :    POLST:      Counseling / Coordination of Care  Total floor / unit time spent today 30 minutes  Greater than 50% of total time was spent with the patient and / or family counseling and / or coordination of care  A description of the counseling / coordination of care: plan of care

## 2018-01-25 NOTE — ANESTHESIA PROCEDURE NOTES
Arterial Line Insertion  Date/Time: 1/25/2018 4:11 PM  Performed by: Emiliana Lacy  Authorized by: Amy Samuel   Consent: Written consent obtained  Consent given by: patient and parent  Patient identity confirmed: arm band  Preparation: Patient was prepped and draped in the usual sterile fashion    Indications: multiple ABGs and hemodynamic monitoring  Orientation:  LeftLocation: radial artery  Erick's test normal: yes  Needle gauge: 20  Seldinger technique: Seldinger technique used  Number of attempts: 2  Post-procedure: dressing applied  Post-procedure CNS: normal  Patient tolerance: Patient tolerated the procedure well with no immediate complications

## 2018-01-26 ENCOUNTER — APPOINTMENT (INPATIENT)
Dept: NON INVASIVE DIAGNOSTICS | Facility: HOSPITAL | Age: 43
DRG: 711 | End: 2018-01-26
Payer: COMMERCIAL

## 2018-01-26 ENCOUNTER — APPOINTMENT (INPATIENT)
Dept: RADIOLOGY | Facility: HOSPITAL | Age: 43
DRG: 711 | End: 2018-01-26
Payer: COMMERCIAL

## 2018-01-26 PROBLEM — R01.1 MURMUR, CARDIAC: Status: ACTIVE | Noted: 2018-01-26

## 2018-01-26 LAB
ANION GAP SERPL CALCULATED.3IONS-SCNC: 4 MMOL/L (ref 4–13)
BASE EXCESS BLDA CALC-SCNC: 4 MMOL/L (ref -2–3)
BASOPHILS # BLD MANUAL: 0 THOUSAND/UL (ref 0–0.1)
BASOPHILS NFR MAR MANUAL: 0 % (ref 0–1)
BUN SERPL-MCNC: 24 MG/DL (ref 5–25)
CA-I BLD-SCNC: 1.41 MMOL/L (ref 1.12–1.32)
CA-I BLD-SCNC: 1.42 MMOL/L (ref 1.12–1.32)
CALCIUM SERPL-MCNC: 9.9 MG/DL (ref 8.3–10.1)
CHLORIDE SERPL-SCNC: 103 MMOL/L (ref 100–108)
CO2 SERPL-SCNC: 29 MMOL/L (ref 21–32)
CREAT SERPL-MCNC: 0.53 MG/DL (ref 0.6–1.3)
EOSINOPHIL # BLD MANUAL: 0.18 THOUSAND/UL (ref 0–0.4)
EOSINOPHIL NFR BLD MANUAL: 1 % (ref 0–6)
ERYTHROCYTE [DISTWIDTH] IN BLOOD BY AUTOMATED COUNT: 14.3 % (ref 11.6–15.1)
GFR SERPL CREATININE-BSD FRML MDRD: 117 ML/MIN/1.73SQ M
GLUCOSE SERPL-MCNC: 128 MG/DL (ref 65–140)
GLUCOSE SERPL-MCNC: 200 MG/DL (ref 65–140)
GLUCOSE SERPL-MCNC: 226 MG/DL (ref 65–140)
GLUCOSE SERPL-MCNC: 232 MG/DL (ref 65–140)
GLUCOSE SERPL-MCNC: 236 MG/DL (ref 65–140)
GLUCOSE SERPL-MCNC: 94 MG/DL (ref 65–140)
HCO3 BLDA-SCNC: 27.5 MMOL/L (ref 22–28)
HCT VFR BLD AUTO: 32.3 % (ref 34.8–46.1)
HCT VFR BLD CALC: 26 % (ref 34.8–46.1)
HGB BLD-MCNC: 10.9 G/DL (ref 11.5–15.4)
HGB BLDA-MCNC: 8.8 G/DL (ref 11.5–15.4)
LG PLATELETS BLD QL SMEAR: PRESENT
LYMPHOCYTES # BLD AUTO: 0.7 THOUSAND/UL (ref 0.6–4.47)
LYMPHOCYTES # BLD AUTO: 4 % (ref 14–44)
MAGNESIUM SERPL-MCNC: 2 MG/DL (ref 1.6–2.6)
MCH RBC QN AUTO: 30.2 PG (ref 26.8–34.3)
MCHC RBC AUTO-ENTMCNC: 33.7 G/DL (ref 31.4–37.4)
MCV RBC AUTO: 90 FL (ref 82–98)
METAMYELOCYTES NFR BLD MANUAL: 2 % (ref 0–1)
MONOCYTES # BLD AUTO: 0.53 THOUSAND/UL (ref 0–1.22)
MONOCYTES NFR BLD: 3 % (ref 4–12)
NEUTROPHILS # BLD MANUAL: 15.8 THOUSAND/UL (ref 1.85–7.62)
NEUTS BAND NFR BLD MANUAL: 3 % (ref 0–8)
NEUTS SEG NFR BLD AUTO: 87 % (ref 43–75)
NRBC BLD AUTO-RTO: 0 /100 WBCS
PCO2 BLD: 29 MMOL/L (ref 21–32)
PCO2 BLD: 35 MM HG (ref 36–44)
PH BLD: 7.5 [PH] (ref 7.35–7.45)
PLATELET # BLD AUTO: 573 THOUSANDS/UL (ref 149–390)
PLATELET BLD QL SMEAR: ABNORMAL
PMV BLD AUTO: 10 FL (ref 8.9–12.7)
PO2 BLD: 163 MM HG (ref 75–129)
POTASSIUM BLD-SCNC: 4.4 MMOL/L (ref 3.5–5.3)
POTASSIUM SERPL-SCNC: 4.6 MMOL/L (ref 3.5–5.3)
RBC # BLD AUTO: 3.61 MILLION/UL (ref 3.81–5.12)
RBC MORPH BLD: PRESENT
SAO2 % BLD FROM PO2: 100 % (ref 95–98)
SODIUM BLD-SCNC: 137 MMOL/L (ref 136–145)
SODIUM SERPL-SCNC: 136 MMOL/L (ref 136–145)
SPECIMEN SOURCE: ABNORMAL
VANCOMYCIN TROUGH SERPL-MCNC: 15.5 UG/ML (ref 10–20)
WBC # BLD AUTO: 17.56 THOUSAND/UL (ref 4.31–10.16)

## 2018-01-26 PROCEDURE — 99024 POSTOP FOLLOW-UP VISIT: CPT | Performed by: NEUROLOGICAL SURGERY

## 2018-01-26 PROCEDURE — 93306 TTE W/DOPPLER COMPLETE: CPT | Performed by: INTERNAL MEDICINE

## 2018-01-26 PROCEDURE — C9113 INJ PANTOPRAZOLE SODIUM, VIA: HCPCS | Performed by: EMERGENCY MEDICINE

## 2018-01-26 PROCEDURE — 85027 COMPLETE CBC AUTOMATED: CPT | Performed by: PHYSICIAN ASSISTANT

## 2018-01-26 PROCEDURE — 80202 ASSAY OF VANCOMYCIN: CPT | Performed by: PHYSICIAN ASSISTANT

## 2018-01-26 PROCEDURE — 85007 BL SMEAR W/DIFF WBC COUNT: CPT | Performed by: PHYSICIAN ASSISTANT

## 2018-01-26 PROCEDURE — 74018 RADEX ABDOMEN 1 VIEW: CPT

## 2018-01-26 PROCEDURE — 82330 ASSAY OF CALCIUM: CPT | Performed by: PHYSICIAN ASSISTANT

## 2018-01-26 PROCEDURE — 99233 SBSQ HOSP IP/OBS HIGH 50: CPT | Performed by: INTERNAL MEDICINE

## 2018-01-26 PROCEDURE — 93931 UPPER EXTREMITY STUDY: CPT | Performed by: SURGERY

## 2018-01-26 PROCEDURE — 80048 BASIC METABOLIC PNL TOTAL CA: CPT | Performed by: PHYSICIAN ASSISTANT

## 2018-01-26 PROCEDURE — 94003 VENT MGMT INPAT SUBQ DAY: CPT

## 2018-01-26 PROCEDURE — 93971 EXTREMITY STUDY: CPT

## 2018-01-26 PROCEDURE — 93306 TTE W/DOPPLER COMPLETE: CPT

## 2018-01-26 PROCEDURE — 94760 N-INVAS EAR/PLS OXIMETRY 1: CPT

## 2018-01-26 PROCEDURE — 83735 ASSAY OF MAGNESIUM: CPT | Performed by: PHYSICIAN ASSISTANT

## 2018-01-26 PROCEDURE — 82948 REAGENT STRIP/BLOOD GLUCOSE: CPT

## 2018-01-26 RX ORDER — HEPARIN SODIUM 5000 [USP'U]/ML
5000 INJECTION, SOLUTION INTRAVENOUS; SUBCUTANEOUS EVERY 8 HOURS SCHEDULED
Status: DISCONTINUED | OUTPATIENT
Start: 2018-01-26 | End: 2018-02-04

## 2018-01-26 RX ORDER — AMOXICILLIN 250 MG
1 CAPSULE ORAL
Status: DISCONTINUED | OUTPATIENT
Start: 2018-01-26 | End: 2018-01-31

## 2018-01-26 RX ADMIN — PANTOPRAZOLE SODIUM 40 MG: 40 INJECTION, POWDER, FOR SOLUTION INTRAVENOUS at 20:34

## 2018-01-26 RX ADMIN — HYDROMORPHONE HYDROCHLORIDE 1 MG: 1 INJECTION, SOLUTION INTRAMUSCULAR; INTRAVENOUS; SUBCUTANEOUS at 22:49

## 2018-01-26 RX ADMIN — PANTOPRAZOLE SODIUM 40 MG: 40 INJECTION, POWDER, FOR SOLUTION INTRAVENOUS at 08:46

## 2018-01-26 RX ADMIN — SODIUM CHLORIDE, SODIUM GLUCONATE, SODIUM ACETATE, POTASSIUM CHLORIDE AND MAGNESIUM CHLORIDE 100 ML/HR: 526; 502; 368; 37; 30 INJECTION, SOLUTION INTRAVENOUS at 07:46

## 2018-01-26 RX ADMIN — HYDROMORPHONE HYDROCHLORIDE 1 MG: 1 INJECTION, SOLUTION INTRAMUSCULAR; INTRAVENOUS; SUBCUTANEOUS at 20:34

## 2018-01-26 RX ADMIN — INSULIN LISPRO 2 UNITS: 100 INJECTION, SOLUTION INTRAVENOUS; SUBCUTANEOUS at 12:25

## 2018-01-26 RX ADMIN — PIPERACILLIN SODIUM AND TAZOBACTAM SODIUM 3.38 G: 36; 4.5 INJECTION, POWDER, FOR SOLUTION INTRAVENOUS at 10:57

## 2018-01-26 RX ADMIN — HEPARIN SODIUM 5000 UNITS: 5000 INJECTION, SOLUTION INTRAVENOUS; SUBCUTANEOUS at 13:28

## 2018-01-26 RX ADMIN — HYDROMORPHONE HYDROCHLORIDE 1 MG: 1 INJECTION, SOLUTION INTRAMUSCULAR; INTRAVENOUS; SUBCUTANEOUS at 13:29

## 2018-01-26 RX ADMIN — HYDROMORPHONE HYDROCHLORIDE 1 MG: 1 INJECTION, SOLUTION INTRAMUSCULAR; INTRAVENOUS; SUBCUTANEOUS at 17:32

## 2018-01-26 RX ADMIN — Medication 3 G: at 16:01

## 2018-01-26 RX ADMIN — HEPARIN SODIUM 5000 UNITS: 5000 INJECTION, SOLUTION INTRAVENOUS; SUBCUTANEOUS at 21:06

## 2018-01-26 RX ADMIN — HYDROMORPHONE HYDROCHLORIDE 1 MG: 1 INJECTION, SOLUTION INTRAMUSCULAR; INTRAVENOUS; SUBCUTANEOUS at 11:35

## 2018-01-26 RX ADMIN — PIPERACILLIN SODIUM AND TAZOBACTAM SODIUM 3.38 G: 36; 4.5 INJECTION, POWDER, FOR SOLUTION INTRAVENOUS at 04:12

## 2018-01-26 RX ADMIN — INSULIN LISPRO 2 UNITS: 100 INJECTION, SOLUTION INTRAVENOUS; SUBCUTANEOUS at 17:43

## 2018-01-26 RX ADMIN — VANCOMYCIN HYDROCHLORIDE 1000 MG: 1 INJECTION, SOLUTION INTRAVENOUS at 10:57

## 2018-01-26 RX ADMIN — PROPOFOL 25 MCG/KG/MIN: 10 INJECTION, EMULSION INTRAVENOUS at 02:17

## 2018-01-26 RX ADMIN — CHLORHEXIDINE GLUCONATE 15 ML: 1.2 RINSE ORAL at 08:46

## 2018-01-26 RX ADMIN — CALCIUM GLUCONATE: 94 INJECTION, SOLUTION INTRAVENOUS at 01:49

## 2018-01-26 RX ADMIN — HYDROMORPHONE HYDROCHLORIDE 1 MG: 1 INJECTION, SOLUTION INTRAMUSCULAR; INTRAVENOUS; SUBCUTANEOUS at 15:28

## 2018-01-26 RX ADMIN — Medication 3 G: at 21:56

## 2018-01-26 RX ADMIN — MICAFUNGIN SODIUM 100 MG: 10 INJECTION, POWDER, LYOPHILIZED, FOR SOLUTION INTRAVENOUS at 21:14

## 2018-01-26 RX ADMIN — INSULIN LISPRO 2 UNITS: 100 INJECTION, SOLUTION INTRAVENOUS; SUBCUTANEOUS at 08:46

## 2018-01-26 NOTE — PROGRESS NOTES
Progress Note - Neurosurgery   Rosaura Sinclair 36 y o  female MRN: [id number]  Unit/Bed#: ICU 10 Encounter: [id number]    Assessment:  1  POD#1 Externalization right  shunt system at second left intercostal space anterior chest wall  2  Externalized "EVD" been set at +8 mm mercury above level of tragus  3  Average Csf output 7 cc an hour (target 5-10 cc an hour)  4  CSF output was 51 over 10 hours after surgery  5  CSF output this morning 55 cc over seven hrs  6  CSF sampling 1/25/2018 from OR distal end of shortened divided peritoneal tubing benign  WBC CSF WBC 1  7  Now extubated  8  GCS 15 alert talking moving all four extremities  9  Weak  10  NG feeding tube  11  POD#1   Ex lap w washout of multiple large loculations contaminated abdomen with placement of drains  12  Status post sleeve gastrectomy  c November 2016  13  Status post hiatal hernia repair and laparoscopic December 2017  14  Status post gastric leak January 2018  15  Prior headaches over four months 2016  12  Saw Ophthalmology  17  Diagnosed with pseudotumor cerebri  18  Initially had LP shunt- this did not work  23  Converted to right  shunt system 2016        Plan:  1  Maintain EVD at  + 8 mmhg above level of tragus to drain 5-10 cc an hour average ( i e  Conservative)  2  Monitor neuro exam closely  3  Continue IV antibiotics Zosyn  4  Continue IV antibiotics vancomycin  5  Continue IV micafungin  6  Change anterior chest wall second intercostal space dressing; done    Impression: This pleasant 50-year-old woman with multiple recent secondary intra-abdominal problems and gastric leak status post hiatal hernia repair and original bariatric surgery back in 2016 is making satisfactory early progress following washout of extensive loculated abdominal collections and externalization of right-sided  shunt in second intercostal space in the right anterior chest wall    Neurological exam is intact    No diplopia Denies headaches    Recommendations: We will continue to drain externalized distal divided   shunt system at +8 mm mercury level above the tragus to allow conservative drainage 5-10 cc an hour    Await final cultures on preliminary benign looking clear CSF supple from divided distal BP shunt tubing    Continue IV antibiotics X 3     Continue TPN and also NG feeding when she is able to accept    It is likely that the abdomen will not be a suitable terminus for replacement of the  shunt system    We will consider the absolute need for the right  shunt system system at a later time  Alternate alternative determine I can be considered- svc/atrial versus pleural    I discussed her progress with Dr Jasso and the General Surgery team    I discussed her progress with her mother Марина Lau   Chief Complaint:  Weakness and abdominal pain    Subjective:  I feel tired    Objective:  Stable neuro exam    Awake alert interactive  Follows commands readily  Generalized weakness  Afebrile less than 99    Moving arms and legs well  Right externalized drain in second intercostal space looks satisfactory    Dressing taken down and tell for removed as slightly moist over a transverse incision repair    Junction between existing catheter the pudendal connector and Codman Bactiseal tubing dry    Dry gauze dressing reapplied with isolation Tegaderm and multiple Steri-Strips to prevent pulling and kinking    Abdomen distended    AEx lap dressings multiple drains per trauma surgery    WBC today 17,500 not unexpected    Intake/Output       01/26/18 0701 - 01/27/18 0700      [id number] 9750-2748 Total       Intake    I V   634 5  -- 634 5    IV Piggyback  50  -- 50    TPN  436 2  -- 436 2    Total Intake 1120 7 -- 1120 7       Output    Urine  50  -- 50    Output (mL) (Urethral Catheter Latex 16 Fr ) 50 -- 50    Drains  195  -- 195    Output (mL) (Closed/Suction Drain Right RUQ Accordion 19 Fr ) 40 -- 40    Output (mL) (Closed/Suction Drain Right RLQ Bulb 19 Fr ) 30 -- 30    Output (mL) (Closed/Suction Drain Left LUQ Bulb 19 Fr ) 30 -- 30    Output (mL) (Closed/Suction Drain Left LLQ Bulb 19 Fr ) 65 -- 65    Output (mL) (Ventriculostomy/Subdural Ventricular drainage catheter Right) 30 -- 30    Total Output 245 -- 245       Net I/O     875 7 -- 875 7          Invasive Devices     Peripherally Inserted Central Catheter Line            PICC Line 20/44/27 Right Basilic 5 days          Peripheral Intravenous Line            Peripheral IV 01/25/18 Right Wrist less than 1 day          Arterial Line            Arterial Line 01/25/18 Left Radial less than 1 day          Drain            Closed/Suction Drain Left LLQ Bulb 19 Fr  less than 1 day    Closed/Suction Drain Left LUQ Bulb 19 Fr  less than 1 day    Closed/Suction Drain Right RLQ Bulb 19 Fr  less than 1 day    Closed/Suction Drain Right RUQ Accordion 19 Fr  less than 1 day    NG/OG/Enteral Tube Nasogastric 12 Fr Left nares less than 1 day    Urethral Catheter Latex 16 Fr  less than 1 day    Ventriculostomy/Subdural Ventricular drainage catheter Right less than 1 day                Physical Exam: as above    Vitals: Blood pressure 139/71, pulse 84, temperature 98 6 °F (37 °C), temperature source Oral, resp  rate (!) 31, height 5' 4" (1 626 m), weight 70 9 kg (156 lb 4 9 oz), SpO2 100 %, not currently breastfeeding  ,Body mass index is 26 83 kg/m²  Hemodynamic Monitoring: MAP: Arterial Line MAP (mmHg): 86 mmHg            Lab Results: I have personally reviewed pertinent results  Admission on 01/24/2018   No results displayed because visit has over 200 results  Imaging Studies: I have personally reviewed pertinent films in PACS with a Radiologist     EKG, Pathology, and Other Studies: I have personally reviewed pertinent reports        VTE Pharmacologic Prophylaxis: Sequential compression device (Venodyne)     VTE Mechanical Prophylaxis: sequential compression device

## 2018-01-26 NOTE — ANESTHESIA POSTPROCEDURE EVALUATION
Post-Op Assessment Note      CV Status:  Stable    Mental Status:  Somnolent (pt sedated)    Hydration Status:  Stable    PONV Controlled:  Controlled    Airway Patency:  Patent  Airway: intubated (ETT remains in situ)    Post Op Vitals Reviewed: Yes          Staff: Anesthesiologist, CRNA           BP   114/78   Temp     Pulse  74   Resp   14   SpO2   100

## 2018-01-26 NOTE — MEDICAL STUDENT
Progress Note - Critical Care   Luis Mercadoman 36 y o  female MRN: [id number]  Unit/Bed#: ICU 10 Encounter: [id number]    HPI/24hr events: Pt s/p externalization of  shunt and exploratory laparotomy with washout of multiple abscesses, draining over 1000 cc of bilious pus combined from the R and L upper quadrants along with purulent serosanguinous fluid from the pelvis  Four ADENIKE drains were placed  A NG feeding tube was also placed into her pylorus and secured to her nose  Neuro:   1  S/p  shunt externalization: f/u cultures   2  Analgesia/sedation:   -continuous: fentanyl @ 75 mcg/hr IV, propofol  @ 20 mcg/kg/min IV  -prn: fentanyl 25 mcg IV q 3 min, dilaudid   5 mg IV q 5 min, dilaudid 1 mg IV q 2h, demerol 12 5 mg IV   -regulate sleep/wake cycle  -trend neuro exam                  CV:   1  3/4 systolic murmur: ECHO obtained at [facility], in process of obtaining records  2  Maintain MAP > 65   2  Access:   -PICC line at right basilic: 5 days   -peripheral IV right wrist: less than 1 day  -a-line left radial: less than 1 day                  Lun  Intubated for worsening sepsis 12/450/40%/PEEP5                  GI:  2  Acute peritonitis:  -S/p EGD  with stent placement  -s/p ex-lap  for abdominal abscesses and fluid collections : 4 ADENIKE tubes put in place; Terressa Merry NG tube was put in place   -stress ulcer prophylaxis: IV protonix 40 mg IV q12 h   -consider starting bowel regimen today                   FEN:  1  Fluids: LR @ 100 mL/hr   2  Electrolytes: Replete electrolytes as needed (goal: K<4, Mag > 2 and Phos > 3)  3  Nutrition:  -NPO  -TPN @ 72 1 mL/hr started last night @ 2100   -consider starting trickle tube feeds today               :   1  Giles Catheter since last night:  -1 5 L UOP in 24 hrs   -Cr:  53  -continue to monitor Is and Os                  ID: WBC: 17 56 (from 15 42 yesterday)  1   Acute peritonitis :  -continue vancomycin (1000 mg IV q 12 h ) , zosyn (3,375 g IV q 6 h), micofungin (100 mg q 24 h)   -vanc trough day 1000   -f/u : cx and CSF from OSH, cx and CSF from surgery   -trend WBCs and temps                  Heme:   -Hgb stable at 10 9  -consider restarting sub-Q heparin today    -likely DVT in left upper extremity: f/u venous duplex U/S              Endo:  -glycemic control : insulin lispro q6                            Msk/Skin:  -frequent turning and pressure off-loading  -local wound care as needed                  Disposition: continue ICU care      Consultants:General surgery, neurosurgery, ID           Vitals:    18 0400 18 0500 18 0528 18 0600   BP:       BP Location:       Pulse: 94 90  92   Resp: 14 14  14   Temp: 98 6 °F (37 °C)      TempSrc: Oral      SpO2: 100% 100% 100% 100%   Weight:       Height:         Arterial Line BP: 100/60  Arterial Line MAP (mmHg): 76 mmHg    Temperature:   Temp (24hrs), Av 9 °F (36 6 °C), Min:97 1 °F (36 2 °C), Max:98 6 °F (37 °C)    Current: Temperature: 98 6 °F (37 °C)    Weights:   IBW: 54 7 kg    Body mass index is 26 83 kg/m²    Weight (last 2 days)     Date/Time   Weight    18 1823  70 9 (156 31)              Hemodynamic Monitoring:  Non-Invasive/Invasive Ventilation Settings:  Respiratory    Lab Data (Last 4 hours)    None         O2/Vent Data (Last 4 hours)       05           Vent Mode AC/VC       Resp Rate (BPM) (BPM) 12       Vt (mL) (mL) 450       FIO2 (%) (%) 40       PEEP (cmH2O) (cmH2O) 5       MV 7 2                 Lab Results   Component Value Date    PHART 7 484 (H) 2018    QJE4OWL 33 6 (L) 2018    PO2ART 165 1 (H) 2018    LBU3CZE 24 7 2018    BEART 1 6 2018    SOURCE Line, Arterial 2018         Intake and Outputs:  I/O        0701 -  0700  07 -  07    I V  (mL/kg)  1135 (16)    IV Piggyback  100    Total Intake(mL/kg)  1235 (17 4)    Urine (mL/kg/hr)  900    Drains  760    Total Output   1660    Net   -425 Nutrition:        Diet Orders            Start     Ordered    01/24/18 1852  Diet NPO  Diet effective now     Question Answer Comment   Diet Type NPO    RD to adjust diet per protocol? No        01/24/18 1904            Labs:     Results from last 7 days  Lab Units 01/26/18 0431 01/25/18 2118 01/25/18 0427   WBC Thousand/uL 17 56* 14 10* 14 61*   HEMOGLOBIN g/dL 10 9* 10 4* 10 7*   HEMATOCRIT % 32 3* 31 2* 33 2*   PLATELETS Thousands/uL 573* 589* 572*   MONO PCT MAN % 3* 5 7        Results from last 7 days  Lab Units 01/26/18  0431 01/25/18 2118 01/25/18 0427 01/24/18  2244   SODIUM mmol/L 136 139 138 137   POTASSIUM mmol/L 4 6 4 5 4 9 4 9   CHLORIDE mmol/L 103 105 102 102   CO2 mmol/L 29 28 32 31   BUN mg/dL 24 21 22 21   CREATININE mg/dL 0 53* 0 43* 0 44* 0 37*   CALCIUM mg/dL 9 9 10 0 10 3* 10 2*   TOTAL PROTEIN g/dL  --   --   --  5 3*   BILIRUBIN TOTAL mg/dL  --   --   --  0 91   ALK PHOS U/L  --   --   --  177*   ALT U/L  --   --   --  61   AST U/L  --   --   --  28   GLUCOSE RANDOM mg/dL 200* 108 110 109       Results from last 7 days  Lab Units 01/26/18 0431 01/25/18 2118 01/25/18 0427   MAGNESIUM mg/dL 2 0 1 7 1 5*       Results from last 7 days  Lab Units 01/25/18  0427   PHOSPHORUS mg/dL 4 1        Results from last 7 days  Lab Units 01/24/18  1954   INR  1 26*   PTT seconds 28       Results from last 7 days  Lab Units 01/25/18 2118   LACTIC ACID mmol/L 1 3     No results found for: TROPONINI      Micro:  No results found for: Esha Scruggs, [facility] , SPUTUMCULTUR    Allergies:    Allergies   Allergen Reactions    Benadryl [Diphenhydramine] Swelling    Phenergan [Promethazine] Hives       Medications:   Scheduled Meds:    chlorhexidine 15 mL [employer] Spit Q12H [address]   insulin lispro 1-5 Units Subcutaneous Q6H MYRNA   micafungin 100 mg Intravenous Q24H   pantoprazole 40 mg Intravenous Q12H MYRNA   piperacillin-tazobactam 3 375 g Intravenous Q6H   vancomycin 15 mg/kg Intravenous Q12H Continuous Infusions:    Adult TPN (CUSTOM BASE/CUSTOM ELECTROLYTE)  Last Rate: 72 1 mL/hr at 01/26/18 0149   fentaNYL 75 mcg/hr Last Rate: 75 mcg/hr (01/25/18 2137)   multi-electrolyte 100 mL/hr Last Rate: 100 mL/hr (01/26/18 0746)   propofol 5-50 mcg/kg/min Last Rate: 20 mcg/kg/min (01/26/18 0420)     PRN Meds:    albuterol 2 5 mg Once PRN   fentaNYL 25 mcg Q3 min PRN   HYDROmorphone 0 5 mg Q5 Min PRN   HYDROmorphone 1 mg Q2H PRN   meperidine 12 5 mg PRN   ondansetron 4 mg Once PRN       VTE Pharmacologic Prophylaxis: consider restarting subQ heparin today - may need therapeutic dose if upper extremity DVT confirmed   VTE Mechanical Prophylaxis: sequential compression device    Invasive lines and devices: Invasive Devices     Peripherally Inserted Central Catheter Line            PICC Line 28/32/47 Right Basilic 5 days          Peripheral Intravenous Line            Peripheral IV 01/25/18 Right Wrist less than 1 day          Arterial Line            Arterial Line 01/25/18 Left Radial less than 1 day          Drain            Closed/Suction Drain Left LLQ Bulb 19 Fr  less than 1 day    Closed/Suction Drain Left LUQ Bulb 19 Fr  less than 1 day    Closed/Suction Drain Right RLQ Bulb 19 Fr  less than 1 day    Closed/Suction Drain Right RUQ Accordion 19 Fr  less than 1 day    NG/OG/Enteral Tube Nasogastric 12 Fr Left nares less than 1 day    Urethral Catheter Latex 16 Fr  less than 1 day    Ventriculostomy/Subdural Ventricular drainage catheter Right less than 1 day          Airway            ETT  Cuffed;Oral;Inflated 7 mm less than 1 day                      Code Status: Level 1 - Full Code     Portions of the record may have been created with voice recognition software  Occasional wrong word or "sound a like" substitutions may have occurred due to the inherent limitations of voice recognition software  Read the chart carefully and recognize, using context, where substitutions have occurred       Sharron Brittle Nesha Talamantes

## 2018-01-26 NOTE — PROGRESS NOTES
Progress Note - Infectious Disease   Hayden Robison 36 y o  female MRN: [id number]  Unit/Bed#: ICU 10 Encounter: [id number]      Impression/Recommendations:  1  Intra-abdominal/pelvic abscesses  Patient is now status post repeat abdominal washout  She has multiple drains in place  She is clinically well and systemically stable  I obtained and reviewed operative culture from [facility], growing only ampicillin susceptible Enterococcus  Operative culture here from yesterday pending  Change antibiotic to IV Unasyn  Follow-up on operative culture here      2   Possible infected  shunt  Given presence of tip of  shunt in peritoneal space, there is high probability of  shunt infection  Fortunately, patient has no symptoms concerning for meningitis  She has neurological deficits  She is status post tapping of  shunt at [facility]   CSF culture there had no growth but patient had prior antibiotic  I will go ahead and treat her for possible/presumptive  shunt infection  Patient is now status post  shunt externalization  Antibiotic plan as in above  Likely treat with IV antibiotic x 4-6 weeks  Follow up on CSF obtained from surgery here      3   Gastric perforation, status post repair  Patient has persistent GE junction leak  She is now status post placement of esophageal stent  Patient may need another upper GI down the line to assess for any persistent leak      4  Septic shock, on presentation at [facility]   This is secondary to gastric perforation  Patient is now hemodynamically stable  All cultures from [facility] obtained and reviewed, now on chart  All blood cultures were negative  Urine culture negative  CSF culture negative  Operative culture positive for Enterococcus, as in above  Antibiotic plan as in above  Monitor hemodynamics      Discussed with the patient and her fiance in detail regarding above plan      Antibiotics:  Vancomycin/Zosyn  POD # 1    Subjective:  Patient is status post abdominal washout and externalization of VPS  She is currently intubated but awake and alert  Abdominal pain controlled  Temperature stays down  No further chills  She is tolerating antibiotics well  No nausea, vomiting or diarrhea  Objective:  Vitals:  HR:  [84-96] 90  Resp:  [11-33] 33  BP: (139)/(71) 139/71  SpO2:  [96 %-100 %] 100 %  Temp (24hrs), Av 9 °F (36 6 °C), Min:97 1 °F (36 2 °C), Max:98 6 °F (37 °C)  Current: Temperature: 98 6 °F (37 °C)    Physical Exam:     General: Awake, alert on ventilator  Cooperative, no distress  Chest:  Right chest wall the PS site clean  No erythema/warmth  No purulence  Minimal tenderness  Lungs: Expansion symmetric, no rales, no wheezing, respirations unlabored  Heart[de-identified]  Regular rate and rhythm, S1 and S2 normal, no murmur  Abdomen: Soft, mild distention  Incision clean  Moderate incisional tenderness  Multiple drains in place, some with serous drainage and some with seropurulent drainage  Extremities: No edema  No erythema/warmth  No ulcer  Nontender to palpation  Skin:  No rash       Invasive Devices     Peripherally Inserted Central Catheter Line            PICC Line  Right Basilic 5 days          Peripheral Intravenous Line            Peripheral IV 18 Right Wrist less than 1 day          Arterial Line            Arterial Line 18 Left Radial less than 1 day          Drain            Closed/Suction Drain Left LLQ Bulb 19 Fr  less than 1 day    Closed/Suction Drain Left LUQ Bulb 19 Fr  less than 1 day    Closed/Suction Drain Right RLQ Bulb 19 Fr  less than 1 day    Closed/Suction Drain Right RUQ Accordion 19 Fr  less than 1 day    NG/OG/Enteral Tube Nasogastric 12 Fr Left nares less than 1 day    Urethral Catheter Latex 16 Fr  less than 1 day    Ventriculostomy/Subdural Ventricular drainage catheter Right less than 1 day                Labs studies:   I have personally reviewed pertinent labs  Results from last 7 days  Lab Units 01/26/18  0431 01/25/18 2118 01/25/18 1857 01/25/18  0427 01/24/18  2244   SODIUM mmol/L 136 139  --  138 137   POTASSIUM mmol/L 4 6 4 5  --  4 9 4 9   CHLORIDE mmol/L 103 105  --  102 102   CO2 mmol/L 29 28  --  32 31   ANION GAP mmol/L 4 6  --  4 4   BUN mg/dL 24 21  --  22 21   CREATININE mg/dL 0 53* 0 43*  --  0 44* 0 37*   EGFR ml/min/1 73sq m 117 125  --  124 131   GLUCOSE RANDOM mg/dL 200* 108  --  110 109   GLUCOSE, ISTAT mg/dl  --   --  94  --   --    CALCIUM mg/dL 9 9 10 0  --  10 3* 10 2*   AST U/L  --   --   --   --  28   ALT U/L  --   --   --   --  61   ALK PHOS U/L  --   --   --   --  177*   TOTAL PROTEIN g/dL  --   --   --   --  5 3*   BILIRUBIN TOTAL mg/dL  --   --   --   --  0 91       Results from last 7 days  Lab Units 01/26/18 0431 01/25/18 2118 01/25/18 1857 01/25/18  0427   WBC Thousand/uL 17 56* 14 10*  --  14 61*   HEMOGLOBIN g/dL 10 9* 10 4*  --  10 7*   I STAT HEMOGLOBIN g/dl  --   --  8 8*  --    PLATELETS Thousands/uL 573* 589*  --  572*       Results from last 7 days  Lab Units 01/25/18 1851 01/25/18  1819 01/25/18  1704   GRAM STAIN RESULT  No Polys or Bacteria seen 3+ Polys  No bacteria seen 1+ Polys  No bacteria seen       Imaging Studies:   I have personally reviewed pertinent imaging study reports and images in PACS  EKG, Pathology, and Other Studies:   I have personally reviewed pertinent reports

## 2018-01-26 NOTE — RESPIRATORY THERAPY NOTE
RT Ventilator Management Note  Leslie Mcfarland 36 y o  female MRN: [id number]  Unit/Bed#: ICU 10 Encounter: [id number]      Daily Screen     No data found              Physical Exam:   Assessment Type: (P) Assess only  General Appearance: (P) Sedated  Respiratory Pattern: (P) Assisted  Chest Assessment: (P) Chest expansion symmetrical  Bilateral Breath Sounds: (P) Coarse      Resp Comments: (P) pt appears comfortable on current settings will continue to monitor

## 2018-01-26 NOTE — OP NOTE
OPERATIVE REPORT  PATIENT NAME: Ayaz Wellington    :  1975  MRN: [id number]  Pt Location: BE OR ROOM 17    SURGERY DATE: 2018    Surgeon(s) and Role:  Panel 1:     * Aracelis Redding MD - Primary    Panel 2:     * Terra Gleason DO - Primary     * Natalya Hutchins     * Daniela Templeton MD - Assisting    Preop Diagnosis:  Acute peritonitis ([address] ) [K65 0]  Peritonitis ([address] ) [K65 9]  S/P  shunt [Z98 2]  Gastric leak [K91 89]   (ventriculoperitoneal) shunt status [Z98 2]    Post-Op Diagnosis Codes:     * Acute peritonitis ([address] ) [K65 0]     * Peritonitis ([address] ) [K65 9]     * S/P  shunt [Z98 2]     * Gastric leak [K91 89]     *  (ventriculoperitoneal) shunt status [Z98 2]    Procedure(s) (LRB):  Externalization of right-sided SHUNT VENTRICULAR-PERITONEAL in anterior chest wall ribs two and three level   (Right)  Exploratory Laparotomy, wash out,placement of drains, placement of NG feeding tube  (N/A)    Specimen(s):  ID Type Source Tests Collected by Time Destination   A : CULTURE / GRAM STAIN: Right chest  shunt Cerebrospinal Fluid Lumbar Puncture GLUCOSE, CSF, PROTEIN TOTAL, CSF, CSF WBC COUNT WITH DIFFERENTIAL, FUNGAL CULTURE, VIRUS CULTURE, CSF CULTURE AND GRAM STAIN, AFB CULTURE WITH STAIN, RED CELL COUNT, CSF Aracelis Redding MD 2018 1651    B : Purulent fluid anterior chest wall incision rib 2 Tissue Chest Wall ANAEROBIC CULTURE AND GRAM STAIN, FUNGAL CULTURE, CULTURE, TISSUE AND GRAM STAIN Aracelis Redding MD 2018 1704    C : Peritoneal catheter tip for aerobic culture Catheter Tip Catheter Tip CATHETER TIP CULTURE Aracelis Redding MD 2018 1709    D : 2nd intercostal space wound cultures Tissue Chest Wall ANAEROBIC CULTURE AND GRAM STAIN, FUNGAL CULTURE, CULTURE, TISSUE AND GRAM STAIN Aracelis Redding MD 2018 1710    E : Abdominal fluid Wound Abdominal ANAEROBIC CULTURE AND GRAM STAIN, STAT GRAM STAIN, WOUND CULTURE Aracelis Redding MD 2018 1819        Estimated Blood Loss: 100 mL    Drains:       Anesthesia Type:   General    Operative Indications:  Acute peritonitis ([address] ) [K65 0]  Peritonitis ([address] ) [K65 9]  S/P  shunt [Z98 2]  Gastric leak [K91 89]   (ventriculoperitoneal) shunt status [Z98 2]      Operative Findings:  Multiple intraabdominal fluid collections contained bilious/purulent fluid  Most notable were the left and right upper quadrant collections, containing approximately 1L of succus  Four abdominal drains placed and post-pyloric keofed placed under fluoscopic guidance in the OR  Evaluated for possible J-tube but bowel was too proximal and quite distended,     Complications:   None    Procedure and Technique: This is a 54-year-old female who is a transfer from [facility]   She had undergone a laparoscopic sleeve gastrectomy about 2 years ago for morbid obesity, and had also undergone a ventriculoperitoneal shunt placement for pseudotumor cerebri  About 2 weeks ago she had undergone a laparoscopic hiatal hernia repair and postoperatively developed a gastric leak  She became quite septic and required multiple interventions to repair the perforation as well as drain her intra-abdominal abscess  Yesterday in EGD was performed and a stent was placed through GE junction  However today she was noted to have increased abdominal pain as well as the leukocytosis of 14,000  Therefore a CT scan was performed which demonstrated multiple intra-abdominal collections that were not being drained by her current drains  As a result, we elected to take her to the operating room for externalization of her  shunt as well as exploratory laparotomy, drainage of fluid collections and possible jejunostomy feeding tube placement  All risks, benefits, alternatives were explained to the patient and her mother and everyone agreed with the plan of care  All questions were answered      The patient was identified by armband and verbal communication brought back to the operating room  She was induced by general anesthesia via endotracheal intubation  The 1st portion of the procedure was the externalization of the ventriculoperitoneal shunt  Please see Dr Cassie Broussard dictation for full report  After the externalization of the  shunt portion of the procedure, we proceeded with the exploratory laparotomy  Her abdomen is prepped with ChloraPrep and draped in usual sterile fashion  Her Raymond-Ferro drains were prepped into the field  A time-out was called to review the procedure and details  Antibiotics were administered  A in midline incision was made from below the xiphoid process down to below the umbilicus with a 10 blade scalpel through skin subcutaneous tissues  Bovie electrocautery was used to dissect down to the fascia  The fascia was incised and the abdominal cavity was entered sharply  Immediately upon entering the abdominal cavity, a small amount of serosanguineous drainage was noted  She also had multiple adhesions to the abdominal wall as well as to the small bowel  These adhesions were gently taken down  We 1st turned our attention to the right upper quadrant  Upon exploration of above the liver a very large pus pocket was entered and approximately 500 cc of purulent bilious fluid was drained  We continued to explore all 4 quadrants again another 500 cc of purulent bilious fluid from the left upper quadrant as well  She had some purulent serosanguineous fluid in her pelvis and this was also evacuated  Over 10 L of warm irrigation was used to wash out her abdomen  We then inspected the small bowel an attempt to place a jejunostomy feeding tube  However, the very proximal small bowel just distal to the ligament of Treitz was noted to be quite distended and a multiple adhesions were noted  Therefore we elected to attempt a post pyloric Keofed placement  This was placed post pyloric under fluoroscopic guidance    The Keofed tube was then secured to her nose using 2 0 silk suture  We attempted to explore her upper midline region, however due to the dense adhesions we did not thoroughly explore her stomach  At this point, after we evacuated all of the fluid collections and broke up as much adhesions as we could safely, intra-abdominal drains were placed  Four 19 Turkish Yogesh drains were placed to the bilateral upper and lower quadrants and secured with 3 0 nylon suture  The upper drains were directed inferiorly during the pelvis and the lower drains were directed superiorly to drain above the liver and above the spleen  Once the abdomen was inspected once again, hemostasis was achieved and we proceeded to close the abdomen  The fascia was closed with a running 1  PDS suture  We also placed #1 Vicryl interrupted sutures throughout the closure  The subcutaneous tissues closed with 3 0 Vicryl suture  Her skin was closed with staples  Patient was then brought directly back to the intensive care unit in critical but stable condition  All instrument and sponge counts were correct at the conclusion of the case  RF scanning was negative  Dr Jose Greenberg was present for the entire operation          Patient Disposition:  Critical Care Unit    SIGNATURE: Zina Sarkar MD  DATE: January 25, 2018  TIME: 8:33 PM

## 2018-01-26 NOTE — PROGRESS NOTES
Post of check    Patient returns to the ICU s/p externalization of  shunt and exlap with drainage of multiple abscesses and placement of 4 ADENIKE drains  Cultures sent from abdomen and  shunt  Keofed also placed intraop post pyloric  EBL approximately 100cc  Plan  Neuro:  Analgesia/sedation - propofol gtt and fentanyl gtt at 75mcg/hr  -  [facility] ICU  -  Keep HOB above 10 degrees  -   shunt externalized - F/u cultures  CV:  No active issues  Maintain MAPs > 65    Lung:  Intubated  Will keep intubated over night in anticipation of worsening SIRs response  Check ABG  Post op cxr for tube placement  GI:  S/p exlap and abscess drainage - OK to use keofed for trickle feeds  However, will keep NPO tonight  NO CRUSHED MEDS THROUGH KEOFED  -  Maintain ADENIKE drains to suctions  -  Protonix GI proph  :  Maintain lopez for I/O's  ID:  Follow up cultures  Continue current abx/fungal regimen  FEN:  Isolyte at 100cc/hr  Check postop labs and replete PRN  Will continue TPN  Possibly start trickle feeds tomorrow  MSK:  Frequent turns and offloading  -  DVT proph - subQ heparin    Disposition:  ICU care    Physical Exam   Constitutional: Vital signs are normal  She appears well-developed  She appears toxic  She appears ill  She is sedated and intubated  HENT:   ET tube in place   Eyes: Conjunctivae and lids are normal  Pupils are equal, round, and reactive to light  Cardiovascular: Normal rate, regular rhythm, normal heart sounds, intact distal pulses and normal pulses  No murmur heard  Pulses:       Radial pulses are 2+ on the right side, and 2+ on the left side  Dorsalis pedis pulses are 2+ on the right side, and 2+ on the left side  Pulmonary/Chest: Effort normal and breath sounds normal  She is intubated  Abdominal: Soft  Bowel sounds are decreased  There is no tenderness  Midline incision dressed  LLQ ADENIKE X 2 and RUQ ADENIKE X 2 draining serosanguinous fluid     Genitourinary: Genitourinary Comments: Giles in place  Musculoskeletal:   L radial A-line  R brachial PICC line  Neurological: GCS eye subscore is 1  GCS verbal subscore is 1  GCS motor subscore is 5

## 2018-01-26 NOTE — RESPIRATORY THERAPY NOTE
RT Ventilator Management Note  Erica Dumas 36 y o  female MRN: [id number]  Unit/Bed#: ICU 10 Encounter: [id number]      Daily Screen       1/26/2018 1015             Patient safety screen outcome[de-identified] Passed    Spont breathing trial % for 30 min: Yes            Physical Exam:   Assessment Type: Assess only  General Appearance: Sedated  Respiratory Pattern: Assisted  Chest Assessment: Chest expansion symmetrical  Bilateral Breath Sounds: Coarse      Resp Comments: (P) pt tolerating cpap/ps, did not tolerate 100% TC, pt appears much more comfortable will continue to monitor

## 2018-01-26 NOTE — PROGRESS NOTES
Progress Note - General Surgery   Lizbeth Montesinos 36 y o  female MRN: [id number]  Unit/Bed#: ICU 10 Encounter: [id number]    Assessment and Plan:  Patient Active Problem List   Diagnosis    Gastric leak    Acute peritonitis ([address] )    Idiopathic intracranial hypertension    S/P  shunt    Peritonitis ([facility] [address] )     (ventriculoperitoneal) shunt status       Assessment:  41y/o F transfer from [facility] with history of laparoscopic sleeve gastrectomy with  shunt shortening on 12/19/16, lap hiatal hernia repair on 1/11/18 , ex laparoscopic repair of gastric perforation on 1/14  - status post externalization of R ventricular-peritoneal shunt to anterior chest wall ribs 2-3 on 1/25/18  - Status post ex - lap washout and placement of drains & placement of NG feeding tube 1/25/18    Plan:  Serial abd exams   Continue broad spectrum abx   Continue TPN  Trickle TF   Pain control   Continue Giles catheter and record I&Os   Neuro sx ok with anticoagulation (pt likely has an upper extremity DVT) & continue Venodynes for DVT ppx   Follow up on upper extremity Venous duplex   Rest of care as per SICU team     Subjective:  Patient remains intubated on AC 12/450/40%/PEEP5 - She opens her eyes to voice at this time   She has 4 ADENIKE drains (all serosang) and a subdural drain in place   NGT was sutured in place    Objective:       Vitals   Vitals:    01/26/18 0400 01/26/18 0500 01/26/18 0528 01/26/18 0600   BP:       BP Location:       Pulse: 94 90  92   Resp: 14 14  14   Temp: 98 6 °F (37 °C)      TempSrc: Oral      SpO2: 100% 100% 100% 100%   Weight:       Height:         Temp  Min: 97 1 °F (36 2 °C)  Max: 98 6 °F (37 °C)  IBW: 54 7 kg   Body mass index is 26 83 kg/m²      I/O   I/O       01/23 0701 - 01/24 0700 01/24 0701 - 01/25 0700    I V  (mL/kg)  536 7 (7 6)    IV Piggyback  100    Total Intake(mL/kg)  636 7 (9)    Urine (mL/kg/hr)  900    Drains  600    Total Output   1500    Net   -863 3 Intake/Output Summary (Last 24 hours) at 01/26/18 2706  Last data filed at 01/26/18 0557   Gross per 24 hour   Intake          6497 73 ml   Output             2472 ml   Net          4025 73 ml       Invasive  Devices  Invasive Devices     Peripherally Inserted Central Catheter Line            PICC Line 53/89/40 Right Basilic 5 days          Peripheral Intravenous Line            Peripheral IV 01/25/18 Right Wrist less than 1 day          Arterial Line            Arterial Line 01/25/18 Left Radial less than 1 day          Drain            Closed/Suction Drain Left LLQ Bulb 19 Fr  less than 1 day    Closed/Suction Drain Left LUQ Bulb 19 Fr  less than 1 day    Closed/Suction Drain Right RLQ Bulb 19 Fr  less than 1 day    Closed/Suction Drain Right RUQ Accordion 19 Fr  less than 1 day    NG/OG/Enteral Tube Nasogastric 12 Fr Left nares less than 1 day    Urethral Catheter Latex 16 Fr  less than 1 day    Ventriculostomy/Subdural Ventricular drainage catheter Right less than 1 day          Airway            ETT  Cuffed;Oral;Inflated 7 mm less than 1 day                Active medications  Scheduled Meds:    chlorhexidine 15 mL Swish & Spit Q12H [facility]   insulin lispro 1-5 Units Subcutaneous Q6H [facility]   micafungin 100 mg Intravenous Q24H   pantoprazole 40 mg Intravenous Q12H [facility]   piperacillin-tazobactam 3 375 g Intravenous Q6H   vancomycin 15 mg/kg Intravenous Q12H     Continuous Infusions:    Adult TPN (CUSTOM BASE/CUSTOM ELECTROLYTE)  Last Rate: 72 1 mL/hr at 01/26/18 0149   fentaNYL 75 mcg/hr Last Rate: 75 mcg/hr (01/25/18 2137)   multi-electrolyte 100 mL/hr Last Rate: 100 mL/hr (01/25/18 0921)   propofol 5-50 mcg/kg/min Last Rate: 20 mcg/kg/min (01/26/18 0420)     PRN Meds:     albuterol 2 5 mg Once PRN   fentaNYL 25 mcg Q3 min PRN   HYDROmorphone 0 5 mg Q5 Min PRN   HYDROmorphone 1 mg Q2H PRN   meperidine 12 5 mg PRN   ondansetron 4 mg Once PRN       Physical Exam: /71   Pulse 92   Temp 98 6 °F (37 °C) (Oral) Resp 14   Ht 5' 4" (1 626 m)   Wt 70 9 kg (156 lb 4 9 oz)   LMP  (LMP Unknown)   SpO2 100%   Breastfeeding?  No   BMI 26 83 kg/m²     Physical Exam:  General Appearance: Intubated, Lethargic, cooperative, no distress, appears stated age  Lungs: Vented breath sounds - AC12/450/40%/Peep5  Heart: Regular rate and rhythm, S1 and S2 normal, no murmur, rub or gallop  Abdomen: Soft, non-tender, mildly distended, bowel sounds active in all four quadrants,   No masses or organomegaly  Left ADENIKE - scant amount of grey thick drainage   Right drain / gravity bag - yellow with sediment drainage     Laboratory and Diagnostics:    Results from last 7 days  Lab Units 01/26/18 0431 01/25/18 2118 01/25/18 0427 01/24/18 1954   WBC Thousand/uL 17 56* 14 10* 14 61* 15 42*   HEMOGLOBIN g/dL 10 9* 10 4* 10 7* 10 3*   HEMATOCRIT % 32 3* 31 2* 33 2* 37 8   PLATELETS Thousands/uL 573* 589* 572* 507*   MONO PCT MAN %  --  5 7 5       Results from last 7 days  Lab Units 01/26/18 0431 01/25/18 2118 01/25/18 0427 01/24/18  2244   SODIUM mmol/L 136 139 138 137   POTASSIUM mmol/L 4 6 4 5 4 9 4 9   CHLORIDE mmol/L 103 105 102 102   CO2 mmol/L 29 28 32 31   BUN mg/dL 24 21 22 21   CREATININE mg/dL 0 53* 0 43* 0 44* 0 37*   CALCIUM mg/dL 9 9 10 0 10 3* 10 2*   TOTAL PROTEIN g/dL  --   --   --  5 3*   BILIRUBIN TOTAL mg/dL  --   --   --  0 91   ALK PHOS U/L  --   --   --  177*   ALT U/L  --   --   --  61   AST U/L  --   --   --  28   GLUCOSE RANDOM mg/dL 200* 108 110 109       Results from last 7 days  Lab Units 01/26/18 0431 01/25/18 2118 01/25/18 0427   MAGNESIUM mg/dL 2 0 1 7 1 5*     Lab Results   Component Value Date    PHOS 4 1 01/25/2018        Results from last 7 days  Lab Units 01/24/18 1954   INR  1 26*   PTT seconds 28     No results found for: TROPONINT  ABG:  Lab Results   Component Value Date    PHART 7 484 (H) 01/25/2018    HER7OFU 33 6 (L) 01/25/2018    PO2ART 165 1 (H) 01/25/2018    FSZ1CNS 24 7 01/25/2018    BEART 1 6 01/25/2018    SOURCE Line, Arterial 01/25/2018       Blood Culture: No results found for: BLOODCX  Urine Culture: No results found for: URINECX  Sputum Culture: No components found for: SPUTUMCX    Imaging: I have personally reviewed pertinent reports     and I have personally reviewed pertinent films in PACS    VTE Pharmacologic Prophylaxis: Sequential compression device (Venodyne)   VTE Mechanical Prophylaxis: sequential compression device

## 2018-01-26 NOTE — PROGRESS NOTES
Progress Note - Critical Care   Rosaura Sinclair 36 y o  female MRN: [id number]  Unit/Bed#: ICU 10 Encounter: [id number]    Impression:  Principal Problem:    Gastric leak  Active Problems:    Acute peritonitis ([address] )    Idiopathic intracranial hypertension    S/P  shunt    Peritonitis (HCC)     (ventriculoperitoneal) shunt status      Plan:    Neuro:   · Sedation plan: fentanyl and propofol  · RASS goal: -2 Light Sedation and 0 Alert and Calm   · Fentanyl gtt at 75  · Propofol gtt at 20  · Pain controlled with: Fentanyl gtt  Fentanyl and dilaudid PRN  ·  shunt 2/2 pseudotumor cerebri   · s/p externalization 1/25  · Purulent drainage intra-op - culture pending  · 51cc drainage overnight  · Regulate sleep/wake cycle  · Trend neuro exam    CV:   Hemodynamically stable  Not requiring pressors at this time  · Grade 3/6 systolic murmur - ECHO at outside hospital, obtaining records  · MAP's > 65    Resp:   · Intubated for worsening sepsis  · ACVC - 12/450/40%/5    GI:   · Peritonitis 2/2 GE junction leak  · S/p EGD 1/24 with stent placed  · S/p ex-lap 1/25 for abdominal abscess and fluid collections  · 4 ADENIKE drains in place - all serosanguinous   · RUQ - 95ml  · RLQ - 75ml   · LUQ - 80ml  · LLQ - 195ml  · Keofed in place - currently NPO  Consider tube feeds when able  · Stress ulcer prophylaxis: Protonix IV  · Bowel regimen: consider starting today    F/E/N:   · Fluid/Diuretic plan: LR 100ml/hr  · Nutrition/diet plan: NPO  · Replete electrolytes with goals: K >4 0, Mag >2 0, and Phos >3 0    :   · Indwelling Giles present: yes   · 1 5L UOP in 24 hrs   · Creatinine 0 53  · Trend UOP and BUN/creat  · Strict I and O    ID:   · Leukocytosis trending up - 17 5  · Source of infection: abdominal abscess  · Abx ordered: Vanc, zosyn, micafungin   Day #3  · Vanc trough today 1000  · Cultures pending  · Trend temps and WBC count  · Maintain normothermia    Heme:   · Hemoglobin stable at 10 9   · [facility] - consider restarting sub-q heparin today    Endo:   · Glycemic control plan: Algorithm 2    MSK/Skin:  · Frequent turning and pressure off-loading  · Local wound care as needed    Lines:  · Central venous access: R basilic PICC  · Arterial line: L radial  · Peripheral x1    VTE Prophylaxis:  · Pharmacologic Prophylaxis: consider re-start today  · Mechanical Prophylaxis: sequential compression device    Disposition: Continue ICU care    Consultants: General surgery, Neurosurg    Reason for Admission:     HPI/24hr events: 37 yo female who presents for sepsis  She had a gastric sleeve in 2016 and a  shunt in 2017 for IIH  She recently underwent a hiatal hernia repair on 1/11  She presented to OSH on 1/14 with peritonitis and septic shock, and was taken for emergent ex-lap and was found to have gastric perf - which was repaired  Multiple drains were placed and was started on broad spectrum abx  POD #9 showed large fluid collection in pelvis and leak around GE junction was found  Transferred here for stenting    Nothing significant overnight  Physical Exam:    Physical Exam   Constitutional: She appears well-developed  No distress  She is sedated, intubated and restrained  HENT:   Head: Normocephalic and atraumatic  Neck: Normal range of motion  Neck supple  No JVD present  No tracheal deviation present  Cardiovascular: Normal rate and regular rhythm  Murmur heard  Systolic murmur is present with a grade of 4/6   Pulmonary/Chest: Effort normal  She is intubated  She has no wheezes  She has rhonchi  She has no rales  Abdominal: Soft  She exhibits no distension  Bowel sounds are decreased  There is tenderness  Midline insicison with 4 ADENIKE drains in place  Abdomen soft, not distended with appropriate tenderness   Neurological: GCS eye subscore is 3  GCS verbal subscore is 1  GCS motor subscore is 6  GCS 10T   Skin: Skin is warm and dry  She is not diaphoretic         Vitals:   Vitals:    01/26/18 0200 01/26/18 0300 01/26/18 0400 18 0528   BP:       BP Location:       Pulse: 96 92 94    Resp: 13 12 14    Temp:   98 6 °F (37 °C)    TempSrc:   Oral    SpO2: 100% 100% 100% 100%   Weight:       Height:         Arterial Line BP: 110/60  Arterial Line MAP (mmHg): 78 mmHg    Temperature: Temp (24hrs), Av 9 °F (36 6 °C), Min:97 1 °F (36 2 °C), Max:98 6 °F (37 °C)  Current: Temperature: 98 6 °F (37 °C)    Weights: IBW: 54 7 kg  Body mass index is 26 83 kg/m²  Hemodynamic Monitoring:  N/A       Respiratory:  SpO2: SpO2: 100 %, SpO2 Activity: SpO2 Activity: At Rest, SpO2 Device: O2 Device:  (ett vent AC 12 450 40% 5 )  O2 Flow Rate (L/min): 40 L/min    Intake and Outputs:  Intake/Output Summary (Last 24 hours) at 18 0607  Last data filed at 18 0557   Gross per 24 hour   Intake          6501 12 ml   Output             2392 ml   Net          4109 12 ml     I/O last 24 hours: In: 7736 1 [I V :5838 1; IV Piggyback:1600; TPN:298]  Out: 2745 [IFJMB:1472; Drains:1296; Blood:100]    Stool:      Nutrition:        Diet Orders            Start     Ordered    18 1852  Diet NPO  Diet effective now     Question Answer Comment   Diet Type NPO    RD to adjust diet per protocol?  No        18 1904            Labs:     Results from last 7 days  Lab Units 18  04318  1954   WBC Thousand/uL 17 56* 14 10* 14 61* 15 42*   HEMOGLOBIN g/dL 10 9* 10 4* 10 7* 10 3*   HEMATOCRIT % 32 3* 31 2* 33 2* 37 8   PLATELETS Thousands/uL 573* 589* 572* 507*   MONO PCT MAN %  --  5 7 5       Results from last 7 days  Lab Units 18  043187 18  2244   SODIUM mmol/L 136 139 138 137   POTASSIUM mmol/L 4 6 4 5 4 9 4 9   CHLORIDE mmol/L 103 105 102 102   CO2 mmol/L 29 28 32 31   BUN mg/dL 24 21 22 21   CREATININE mg/dL 0 53* 0 43* 0 44* 0 37*   CALCIUM mg/dL 9 9 10 0 10 3* 10 2*   TOTAL PROTEIN g/dL  --   --   --  5 3*   BILIRUBIN TOTAL mg/dL  --   --   --  0 91   ALK PHOS U/L --   --   --  177*   ALT U/L  --   --   --  61   AST U/L  --   --   --  28   GLUCOSE RANDOM mg/dL 200* 108 110 109       Results from last 7 days  Lab Units 01/26/18  0431 01/25/18 2118 01/25/18  0427   MAGNESIUM mg/dL 2 0 1 7 1 5*       Results from last 7 days  Lab Units 01/25/18 0427   PHOSPHORUS mg/dL 4 1        Results from last 7 days  Lab Units 01/24/18 1954   INR  1 26*   PTT seconds 28       Results from last 7 days  Lab Units 01/25/18 2118 01/24/18 1954   LACTIC ACID mmol/L 1 3 1 5           Results from last 7 days  Lab Units 01/25/18 2252 01/25/18 2125   PH ART  7 484* 7 508*   PCO2 ART mm Hg 33 6* 32 4*   PO2 ART mm Hg 165 1* 165 0*   HCO3 ART mmol/L 24 7 25 2   BASE EXC ART mmol/L 1 6 2 5   ABG SOURCE  Line, Arterial Line, Arterial               Imaging:   Xr Chest Portable    Result Date: 1/25/2018  Impression: No active disease  Workstation performed: [id number]     Xr Abdomen 1 View Kub    Result Date: 1/25/2018  Impression: Fluoroscopic guidance for placement of feeding tube with tip in the 1st portion of the duodenum  Please refer to the separate procedure notes for additional details  Workstation performed: [id number]     Ct Head Wo Contrast    Result Date: 1/25/2018  Impression: No acute intracranial abnormality  Right frontal approach catheter terminating in the region of the third ventricle  Ventricular system is decompressed  No evidence for interstitial edema or extra-axial fluid collections  Workstation performed: [id number]     Ct Chest Abdomen Pelvis W Contrast    Result Date: 1/25/2018  Impression: Extensive perihepatic subcapsular abscess which is in communication with a right paracolic gutter abscess  Drainage catheter within the left subhepatic space does not appear to communicate with this abscess  Large perisplenic abscess with mass effect on the spleen  Peripheral hypodensities within the spleen likely represent splenic infarcts    Infectious involvement less likely but not excluded  Large left paracolic gutter abscess  Mesenteric abscess as described  Pelvic abscess as described, pelvic drainage catheter is anterior to the abscess  Patient is status post placement of esophageal stent as well as gastric sleeve procedure  No evidence of extravasated oral contrast is identified on this exam  Dependent lower lobe atelectasis and small bilateral pleural effusions  Extensive subcutaneous edema suggesting anasarca  Soft tissue gas identified within the left axillary soft tissues, correlate for a potential iatrogenic etiology  If this does not exist, an infectious process related to a gas-forming organism is should be excluded  I personally discussed this study with Lisa Wooten on 1/25/2018 11:58 AM  Workstation performed: [id number]     JE Shunt Series    Result Date: 1/25/2018  Impression: Intact  shunt catheter  Workstation performed: [id number]       Micro:   Blood Culture: No results found for: BLOODCX  Urine Culture: No results found for: URINECX  Sputum Culture: No components found for: SPUTUMCX  Wound Culure: No results found for: WOUNDCULT    Results from last 7 days  Lab Units 01/25/18 1851   GRAM STAIN RESULT  No Polys or Bacteria seen       Allergies:    Allergies   Allergen Reactions    Benadryl [Diphenhydramine] Swelling    Phenergan [Promethazine] Hives       Medications:   Scheduled Meds:  chlorhexidine 15 mL Swish & Spit Q12H [facility]   insulin lispro 1-5 Units Subcutaneous Q6H [facility]   micafungin 100 mg Intravenous Q24H   pantoprazole 40 mg Intravenous Q12H [facility]   piperacillin-tazobactam 3 375 g Intravenous Q6H   vancomycin 15 mg/kg Intravenous Q12H     Continuous Infusions:  Adult TPN (CUSTOM BASE/CUSTOM ELECTROLYTE)  Last Rate: 72 1 mL/hr at 01/26/18 0149   fentaNYL 75 mcg/hr Last Rate: 75 mcg/hr (01/25/18 2137)   multi-electrolyte 100 mL/hr Last Rate: 100 mL/hr (01/25/18 0921)   propofol 5-50 mcg/kg/min Last Rate: 25 mcg/kg/min (01/26/18 0436)     PRN Meds:    albuterol 2 5 mg Once PRN   fentaNYL 25 mcg Q3 min PRN   HYDROmorphone 0 5 mg Q5 Min PRN   HYDROmorphone 1 mg Q2H PRN   meperidine 12 5 mg PRN   ondansetron 4 mg Once PRN       Invasive lines and devices:   Invasive Devices     Peripherally Inserted Central Catheter Line            PICC Line 16/73/89 Right Basilic 5 days          Peripheral Intravenous Line            Peripheral IV 01/25/18 Right Wrist less than 1 day          Arterial Line            Arterial Line 01/25/18 Left Radial less than 1 day          Drain            Closed/Suction Drain Left LLQ Bulb 19 Fr  less than 1 day    Closed/Suction Drain Left LUQ Bulb 19 Fr  less than 1 day    Closed/Suction Drain Right RLQ Bulb 19 Fr  less than 1 day    Closed/Suction Drain Right RUQ Accordion 19 Fr  less than 1 day    NG/OG/Enteral Tube Nasogastric 12 Fr Left nares less than 1 day    Urethral Catheter Latex 16 Fr  less than 1 day    Ventriculostomy/Subdural Ventricular drainage catheter Right less than 1 day          Airway            ETT  Cuffed;Oral;Inflated 7 mm less than 1 day                Code Status: Level 1 - Full Code        SIGNATURE: Kaylee Pedro PA-C  DATE: January 26, 2018  TIME: 6:07 AM

## 2018-01-27 ENCOUNTER — APPOINTMENT (INPATIENT)
Dept: RADIOLOGY | Facility: HOSPITAL | Age: 43
DRG: 711 | End: 2018-01-27
Payer: COMMERCIAL

## 2018-01-27 LAB
ANION GAP SERPL CALCULATED.3IONS-SCNC: 5 MMOL/L (ref 4–13)
BACTERIA SPEC ANAEROBE CULT: NORMAL
BASOPHILS # BLD MANUAL: 0 THOUSAND/UL (ref 0–0.1)
BASOPHILS NFR MAR MANUAL: 0 % (ref 0–1)
BUN SERPL-MCNC: 28 MG/DL (ref 5–25)
CA-I BLD-SCNC: 1.39 MMOL/L (ref 1.12–1.32)
CALCIUM SERPL-MCNC: 9.5 MG/DL (ref 8.3–10.1)
CHLORIDE SERPL-SCNC: 106 MMOL/L (ref 100–108)
CO2 SERPL-SCNC: 30 MMOL/L (ref 21–32)
CREAT SERPL-MCNC: 0.38 MG/DL (ref 0.6–1.3)
EOSINOPHIL # BLD MANUAL: 0.3 THOUSAND/UL (ref 0–0.4)
EOSINOPHIL NFR BLD MANUAL: 2 % (ref 0–6)
ERYTHROCYTE [DISTWIDTH] IN BLOOD BY AUTOMATED COUNT: 14.6 % (ref 11.6–15.1)
GFR SERPL CREATININE-BSD FRML MDRD: 130 ML/MIN/1.73SQ M
GIANT PLATELETS BLD QL SMEAR: PRESENT
GLUCOSE SERPL-MCNC: 134 MG/DL (ref 65–140)
GLUCOSE SERPL-MCNC: 156 MG/DL (ref 65–140)
GLUCOSE SERPL-MCNC: 205 MG/DL (ref 65–140)
GLUCOSE SERPL-MCNC: 97 MG/DL (ref 65–140)
HCT VFR BLD AUTO: 26.8 % (ref 34.8–46.1)
HCT VFR BLD AUTO: 26.9 % (ref 34.8–46.1)
HGB BLD-MCNC: 8.8 G/DL (ref 11.5–15.4)
HGB BLD-MCNC: 8.9 G/DL (ref 11.5–15.4)
LYMPHOCYTES # BLD AUTO: 0.45 THOUSAND/UL (ref 0.6–4.47)
LYMPHOCYTES # BLD AUTO: 3 % (ref 14–44)
MAGNESIUM SERPL-MCNC: 2 MG/DL (ref 1.6–2.6)
MCH RBC QN AUTO: 30 PG (ref 26.8–34.3)
MCHC RBC AUTO-ENTMCNC: 33.1 G/DL (ref 31.4–37.4)
MCV RBC AUTO: 91 FL (ref 82–98)
METAMYELOCYTES NFR BLD MANUAL: 2 % (ref 0–1)
MONOCYTES # BLD AUTO: 0.3 THOUSAND/UL (ref 0–1.22)
MONOCYTES NFR BLD: 2 % (ref 4–12)
MYELOCYTES NFR BLD MANUAL: 1 % (ref 0–1)
NEUTROPHILS # BLD MANUAL: 13.17 THOUSAND/UL (ref 1.85–7.62)
NEUTS BAND NFR BLD MANUAL: 1 % (ref 0–8)
NEUTS SEG NFR BLD AUTO: 86 % (ref 43–75)
NRBC BLD AUTO-RTO: 0 /100 WBCS
PHOSPHATE SERPL-MCNC: 3 MG/DL (ref 2.7–4.5)
PLATELET # BLD AUTO: 642 THOUSANDS/UL (ref 149–390)
PLATELET BLD QL SMEAR: ABNORMAL
PMV BLD AUTO: 9.8 FL (ref 8.9–12.7)
POTASSIUM SERPL-SCNC: 4.1 MMOL/L (ref 3.5–5.3)
RBC # BLD AUTO: 2.97 MILLION/UL (ref 3.81–5.12)
SODIUM SERPL-SCNC: 141 MMOL/L (ref 136–145)
VARIANT LYMPHS # BLD AUTO: 3 %
WBC # BLD AUTO: 15.14 THOUSAND/UL (ref 4.31–10.16)

## 2018-01-27 PROCEDURE — 80048 BASIC METABOLIC PNL TOTAL CA: CPT | Performed by: NURSE PRACTITIONER

## 2018-01-27 PROCEDURE — 71045 X-RAY EXAM CHEST 1 VIEW: CPT

## 2018-01-27 PROCEDURE — 85007 BL SMEAR W/DIFF WBC COUNT: CPT | Performed by: NURSE PRACTITIONER

## 2018-01-27 PROCEDURE — 85027 COMPLETE CBC AUTOMATED: CPT | Performed by: NURSE PRACTITIONER

## 2018-01-27 PROCEDURE — 85018 HEMOGLOBIN: CPT | Performed by: PHYSICIAN ASSISTANT

## 2018-01-27 PROCEDURE — C9113 INJ PANTOPRAZOLE SODIUM, VIA: HCPCS | Performed by: EMERGENCY MEDICINE

## 2018-01-27 PROCEDURE — 85014 HEMATOCRIT: CPT | Performed by: PHYSICIAN ASSISTANT

## 2018-01-27 PROCEDURE — 82948 REAGENT STRIP/BLOOD GLUCOSE: CPT

## 2018-01-27 PROCEDURE — 84100 ASSAY OF PHOSPHORUS: CPT | Performed by: NURSE PRACTITIONER

## 2018-01-27 PROCEDURE — 83735 ASSAY OF MAGNESIUM: CPT | Performed by: NURSE PRACTITIONER

## 2018-01-27 PROCEDURE — 82330 ASSAY OF CALCIUM: CPT | Performed by: NURSE PRACTITIONER

## 2018-01-27 PROCEDURE — 99232 SBSQ HOSP IP/OBS MODERATE 35: CPT | Performed by: INTERNAL MEDICINE

## 2018-01-27 RX ORDER — SENNOSIDES 8.8 MG/5ML
8.8 LIQUID ORAL ONCE
Status: COMPLETED | OUTPATIENT
Start: 2018-01-27 | End: 2018-01-27

## 2018-01-27 RX ADMIN — Medication 3 G: at 21:00

## 2018-01-27 RX ADMIN — HYDROMORPHONE HYDROCHLORIDE 1 MG: 1 INJECTION, SOLUTION INTRAMUSCULAR; INTRAVENOUS; SUBCUTANEOUS at 13:19

## 2018-01-27 RX ADMIN — ONDANSETRON 4 MG: 2 INJECTION INTRAMUSCULAR; INTRAVENOUS at 06:16

## 2018-01-27 RX ADMIN — HYDROMORPHONE HYDROCHLORIDE 1 MG: 1 INJECTION, SOLUTION INTRAMUSCULAR; INTRAVENOUS; SUBCUTANEOUS at 15:58

## 2018-01-27 RX ADMIN — HYDROMORPHONE HYDROCHLORIDE 1 MG: 1 INJECTION, SOLUTION INTRAMUSCULAR; INTRAVENOUS; SUBCUTANEOUS at 00:41

## 2018-01-27 RX ADMIN — PANTOPRAZOLE SODIUM 40 MG: 40 INJECTION, POWDER, FOR SOLUTION INTRAVENOUS at 08:04

## 2018-01-27 RX ADMIN — HYDROMORPHONE HYDROCHLORIDE 0.5 MG: 1 INJECTION, SOLUTION INTRAMUSCULAR; INTRAVENOUS; SUBCUTANEOUS at 20:26

## 2018-01-27 RX ADMIN — Medication 3 G: at 04:19

## 2018-01-27 RX ADMIN — HYDROMORPHONE HYDROCHLORIDE 1 MG: 1 INJECTION, SOLUTION INTRAMUSCULAR; INTRAVENOUS; SUBCUTANEOUS at 03:23

## 2018-01-27 RX ADMIN — INSULIN LISPRO 1 UNITS: 100 INJECTION, SOLUTION INTRAVENOUS; SUBCUTANEOUS at 00:42

## 2018-01-27 RX ADMIN — Medication 3 G: at 15:38

## 2018-01-27 RX ADMIN — PANTOPRAZOLE SODIUM 40 MG: 40 INJECTION, POWDER, FOR SOLUTION INTRAVENOUS at 20:02

## 2018-01-27 RX ADMIN — SENNOSIDES A AND B 8.8 MG: 415.36 LIQUID ORAL at 04:57

## 2018-01-27 RX ADMIN — SODIUM CHLORIDE, SODIUM GLUCONATE, SODIUM ACETATE, POTASSIUM CHLORIDE AND MAGNESIUM CHLORIDE 100 ML/HR: 526; 502; 368; 37; 30 INJECTION, SOLUTION INTRAVENOUS at 19:25

## 2018-01-27 RX ADMIN — HEPARIN SODIUM 5000 UNITS: 5000 INJECTION, SOLUTION INTRAVENOUS; SUBCUTANEOUS at 05:44

## 2018-01-27 RX ADMIN — HYDROMORPHONE HYDROCHLORIDE 1 MG: 1 INJECTION, SOLUTION INTRAMUSCULAR; INTRAVENOUS; SUBCUTANEOUS at 08:17

## 2018-01-27 RX ADMIN — HYDROMORPHONE HYDROCHLORIDE 0.5 MG: 1 INJECTION, SOLUTION INTRAMUSCULAR; INTRAVENOUS; SUBCUTANEOUS at 23:25

## 2018-01-27 RX ADMIN — HEPARIN SODIUM 5000 UNITS: 5000 INJECTION, SOLUTION INTRAVENOUS; SUBCUTANEOUS at 13:18

## 2018-01-27 RX ADMIN — SODIUM CHLORIDE, SODIUM GLUCONATE, SODIUM ACETATE, POTASSIUM CHLORIDE AND MAGNESIUM CHLORIDE 100 ML/HR: 526; 502; 368; 37; 30 INJECTION, SOLUTION INTRAVENOUS at 08:46

## 2018-01-27 RX ADMIN — HYDROMORPHONE HYDROCHLORIDE 1 MG: 1 INJECTION, SOLUTION INTRAMUSCULAR; INTRAVENOUS; SUBCUTANEOUS at 05:44

## 2018-01-27 RX ADMIN — INSULIN LISPRO 1 UNITS: 100 INJECTION, SOLUTION INTRAVENOUS; SUBCUTANEOUS at 06:10

## 2018-01-27 RX ADMIN — HEPARIN SODIUM 5000 UNITS: 5000 INJECTION, SOLUTION INTRAVENOUS; SUBCUTANEOUS at 21:00

## 2018-01-27 RX ADMIN — Medication 3 G: at 11:00

## 2018-01-27 NOTE — PROGRESS NOTES
Progress Note - Infectious Disease   Chalo Mercer 36 y o  female MRN: [id number]  Unit/Bed#: ICU 10 Encounter: [id number]      Impression/Recommendations:  1   Intra-abdominal/pelvic abscesses  Patient is now status post repeat abdominal washout  She has multiple drains in place  She is clinically well and systemically stable  Operative culture from [facility], growing only ampicillin susceptible Enterococcus  Operative culture here all growing Enterococcus  Continue with IV Unasyn  Follow-up on final operative culture here      2   Possible infected  shunt   Given presence of tip of  shunt in peritoneal space, there is high probability of  shunt infection   Fortunately, patient has no symptoms concerning for meningitis   She has neurological deficits   She is status post tapping of  shunt at [facility]   CSF culture there had no growth but patient had prior antibiotic  I will go ahead and treat her for possible/presumptive  shunt infection  Patient is now status post  shunt externalization  CSF culture from 01/25 is negative       Antibiotic plan as in above  Likely treat with IV antibiotic x 4-6 weeks  Follow up on final CSF culture obtained from surgery here      3   Gastric perforation, status post repair  Lynnann Dakin has persistent GE junction leak   She is now status post placement of esophageal stent  Patient may need another upper GI down the line to assess for any persistent leak  Discontinue micafungin      4   Septic shock, on presentation at [facility]   This is secondary to gastric perforation   Patient is now hemodynamically stable  All cultures from [facility] obtained and reviewed, now on chart  All blood cultures were negative  Urine culture negative  CSF culture negative  Operative culture positive for Enterococcus, as in above  Antibiotic plan as in above    Monitor hemodynamics      Discussed with the patient and her fiance in detail regarding above plan      Antibiotics:  Unasyn  POD # 2    Subjective:  Patient is lethargic but arousable  No fevers  No significant overnight events  No diarrhea  No chills  Still having mild abdominal pain  Objective:  Vitals:  HR:  [] 92  Resp:  [10-33] 16  BP: (128)/(83) 128/83  SpO2:  [95 %-100 %] 95 %  Temp (24hrs), Av 2 °F (36 8 °C), Min:97 3 °F (36 3 °C), Max:99 4 °F (37 4 °C)  Current: Temperature: 98 2 °F (36 8 °C)    Physical Exam:   General:  No acute distress  Psychiatric:  Awake and alert  Pulmonary:  Normal respiratory excursion without accessory muscle use  Abdomen:  Soft, mild tenderness, incision is clean, multiple drains in place  Extremities:  No edema  Skin:  No rashes, right chest wall PS site is clean with no warmth or purulent    Lab Results:  I have personally reviewed pertinent labs  Results from last 7 days  Lab Units 18  04218  04318  2244   SODIUM mmol/L 141 136 139  < > 137   POTASSIUM mmol/L 4 1 4 6 4 5  < > 4 9   CHLORIDE mmol/L 106 103 105  < > 102   CO2 mmol/L 30 29 28  < > 31   ANION GAP mmol/L 5 4 6  < > 4   BUN mg/dL 28* 24 21  < > 21   CREATININE mg/dL 0 38* 0 53* 0 43*  < > 0 37*   EGFR ml/min/1 73sq m 130 117 125  < > 131   GLUCOSE RANDOM mg/dL 156* 200* 108  < > 109   GLUCOSE, ISTAT   --   --   --   < >  --    CALCIUM mg/dL 9 5 9 9 10 0  < > 10 2*   AST U/L  --   --   --   --  28   ALT U/L  --   --   --   --  61   ALK PHOS U/L  --   --   --   --  177*   TOTAL PROTEIN g/dL  --   --   --   --  5 3*   BILIRUBIN TOTAL mg/dL  --   --   --   --  0 91   < > = values in this interval not displayed      Results from last 7 days  Lab Units 18  1141 18  0425 18  0431 01/25/18  2118   WBC Thousand/uL  --  15 14* 17 56* 14 10*   HEMOGLOBIN g/dL 8 8* 8 9* 10 9* 10 4*   PLATELETS Thousands/uL  --  642* 573* 589*       Results from last 7 days  Lab Units 18  1851 18  1819 18  1704   GRAM STAIN RESULT  No Polys or Bacteria seen 3+ Polys  No bacteria seen 1+ Polys  No bacteria seen   WOUND CULTURE   --  2+ Growth of Enterococcus species*  --        Imaging Studies:   I have personally reviewed pertinent imaging study reports and images in PACS  EKG, Pathology, and Other Studies:   I have personally reviewed pertinent reports

## 2018-01-27 NOTE — PROGRESS NOTES
Progress Note - General Surgery   Jayla August 36 y o  female MRN: [id number]  Unit/Bed#: ICU 10 Encounter: [id number]    Assessment:  43y/o F transfer from [facility] with history of laparoscopic sleeve gastrectomy with  shunt shortening on 12/19/16, lap hiatal hernia repair on 1/11/18 , ex laparoscopic repair of gastric perforation on 1/14    - status post externalization of R ventricular-peritoneal shunt to anterior chest wall ribs 2-3 on 1/25/18  - Status post ex - lap washout and placement of drains & placement of NG feeding tube 1/25/18       Plan:  TPN off, advance to goal on TF  PRN pain control  Monitor ADENIKE output  Trend WBC- down today to 15 14  On Unasyn, Micafungin per ID  Serial abd exams  SQH DVT pxx- no DVT seen on venous duplex of UE    Subjective/Objective   Chief Complaint: None    Subjective: Still w/ abdominal pain, receiving dilaudid q2  Overall seems to be improved  UOP increased  NO N/V    Objective:     Blood pressure 139/71, pulse 98, temperature 99 4 °F (37 4 °C), temperature source Oral, resp  rate 17, height 5' 4" (1 626 m), weight 70 9 kg (156 lb 4 9 oz), SpO2 99 %, not currently breastfeeding  ,Body mass index is 26 83 kg/m²        Intake/Output Summary (Last 24 hours) at 01/27/18 0538  Last data filed at 01/27/18 0455   Gross per 24 hour   Intake          4949 88 ml   Output             1969 ml   Net          2980 88 ml       Invasive Devices     Peripherally Inserted Central Catheter Line            PICC Line 36/55/55 Right Basilic 6 days          Arterial Line            Arterial Line 01/25/18 Left Radial 1 day          Drain            Closed/Suction Drain Left LLQ Bulb 19 Fr  1 day    Closed/Suction Drain Left LUQ Bulb 19 Fr  1 day    Closed/Suction Drain Right RLQ Bulb 19 Fr  1 day    Closed/Suction Drain Right RUQ Accordion 19 Fr  1 day    NG/OG/Enteral Tube Nasogastric 12 Fr Left nares 1 day    Urethral Catheter Latex 16 Fr  1 day    Ventriculostomy/Subdural Ventricular drainage catheter Right 1 day                Physical Exam:   Gen: A&O, NAD  Cardio: RRR  Lungs: CTAB  Abd: Soft, non distended  Appropriately tender  STaples to midline c/d/i, no drainage  ADENIKE x 4   Right upper ADENIKE serosang, R lower J more sanginous, L upper serosang, L lower more dark/thick      Lab, Imaging and other studies:  CBC:   Lab Results   Component Value Date    WBC 15 14 (H) 01/27/2018    HGB 8 9 (L) 01/27/2018    HCT 26 9 (L) 01/27/2018    MCV 91 01/27/2018     (H) 01/27/2018    MCH 30 0 01/27/2018    MCHC 33 1 01/27/2018    RDW 14 6 01/27/2018    MPV 9 8 01/27/2018   , CMP: No results found for: NA, K, CL, CO2, ANIONGAP, BUN, CREATININE, GLUCOSE, CALCIUM, AST, ALT, ALKPHOS, PROT, ALBUMIN, BILITOT, EGFR  VTE Pharmacologic Prophylaxis: Heparin  VTE Mechanical Prophylaxis: sequential compression device

## 2018-01-27 NOTE — PROGRESS NOTES
Progress Note - Critical Care   Akila Perez 36 y o  female MRN: [id number]  Unit/Bed#: ICU 10 Encounter: [id number]    Impression:  Principal Problem:    Gastric leak  Active Problems:    Acute peritonitis ([address] )    Idiopathic intracranial hypertension    S/P  shunt    Peritonitis (HCC)     (ventriculoperitoneal) shunt status    Murmur, cardiac      Plan:    Neuro:   · Pain controlled with: Fentanyl gtt  Fentanyl and dilaudid PRN  ·  shunt 2/2 pseudotumor cerebri   · s/p externalization 1/25  · Purulent drainage intra-op - culture pending  · Regulate sleep/wake cycle  · Trend neuro exam    CV:   Hemodynamically stable  Not requiring pressors at this time  · Grade 3/6 systolic murmur - ECHO EF 65% with no valvular regurg/stenosis  · MAP's > 65    Resp:   · Extubated; No active issues    GI:   · Peritonitis 2/2 GE junction leak  · S/p EGD 1/24 with stent placed  · S/p ex-lap 1/25 for abdominal abscess and fluid collections  · 4 ADENIKE drains in place - all serosanguinous   · RUQ - 95ml  · RLQ - 75ml   · LUQ - 80ml  · LLQ - 195ml  · Keofed in place - currently NPO  Consider tube feeds when able  · Stress ulcer prophylaxis: Protonix IV  · Bowel regimen: consider starting today  · Surgery will assess keofed; Tube coiling in posterior oropharynx this am    F/E/N:   · Fluid/Diuretic plan: LR 100ml/hr  · Nutrition/diet plan: NPO  · Replete electrolytes with goals: K >4 0, Mag >2 0, and Phos >3 0    :   · Indwelling Giles present: yes   · 1 5L UOP in 24 hrs   · Creatinine 0 53  · Trend UOP and BUN/creat  · Strict I and O    ID:   · Leukocytosis trending up - 17 5  · Source of infection: abdominal abscess  · Abx ordered: Vanc, zosyn, micafungin  Day #3  · Vanc trough today 1000  · Cultures pending  · Trend temps and WBC count  · Maintain normothermia    Heme:   · Hemoglobin 8 9 down from 10 9 pre-op; trend    · HD stable   · AC - consider restarting sub-q heparin today    Endo:   · Glycemic control plan: Algorithm 2    MSK/Skin:  · Frequent turning and pressure off-loading  · Local wound care as needed    Lines:  · Central venous access: R basilic PICC  · Arterial line: L radial  · Peripheral x1    VTE Prophylaxis:  · Pharmacologic Prophylaxis: consider re-start today  · Mechanical Prophylaxis: sequential compression device    Disposition: Continue ICU care    Consultants: General surgery, Neurosurg    Reason for Admission:     HPI/24hr events: 35 yo female who presents for sepsis  She had a gastric sleeve in 2016 and a  shunt in 2017 for IIH  She recently underwent a hiatal hernia repair on 1/11  She presented to OSH on 1/14 with peritonitis and septic shock, and was taken for emergent ex-lap and was found to have gastric perf - which was repaired  Multiple drains were placed and was started on broad spectrum abx  POD #9 showed large fluid collection in pelvis and leak around GE junction was found  Transferred here for stenting    Nothing significant overnight  Physical Exam:    Physical Exam   Constitutional: She appears well-developed  No distress  She is sedated and restrained  HENT:   Head: Normocephalic and atraumatic  Neck: Normal range of motion  Neck supple  No JVD present  No tracheal deviation present  Cardiovascular: Normal rate and regular rhythm  Murmur heard  Systolic murmur is present with a grade of 4/6   Pulmonary/Chest: Effort normal  She has no wheezes  She has no rales  Abdominal: Soft  She exhibits no distension  Bowel sounds are decreased  There is tenderness  Midline insicison with 4 ADENIKE drains in place  Abdomen soft, not distended with appropriate tenderness   Neurological: GCS eye subscore is 3  GCS verbal subscore is 1  GCS motor subscore is 6  GCS 10T   Skin: Skin is warm and dry  She is not diaphoretic         Vitals:   Vitals:    01/27/18 0400 01/27/18 0500 01/27/18 0552 01/27/18 0600   BP:   128/83    BP Location:       Pulse: 94 98 92 92   Resp: 13 22 22 20   Temp: 99 4 °F (37 4 °C)      TempSrc: Oral      SpO2: 100% 99% 99% 99%   Weight:       Height:         Arterial Line BP: 130/64  Arterial Line MAP (mmHg): 88 mmHg    Temperature: Temp (24hrs), Av 3 °F (36 8 °C), Min:97 3 °F (36 3 °C), Max:99 4 °F (37 4 °C)  Current: Temperature: 99 4 °F (37 4 °C)    Weights: IBW: 54 7 kg  Body mass index is 26 83 kg/m²  Hemodynamic Monitoring:  N/A       Respiratory:  SpO2: SpO2: 99 %, SpO2 Activity: SpO2 Activity: At Rest, SpO2 Device: O2 Device: Nasal cannula  O2 Flow Rate (L/min): 2 L/min    Intake and Outputs:    Intake/Output Summary (Last 24 hours) at 18 0650  Last data filed at 18 0557   Gross per 24 hour   Intake          5093 87 ml   Output             2011 ml   Net          3082 87 ml     I/O last 24 hours: In: 92938 6 [I V :6609 2; NG/GT:30; IV Piggyback:1600; TPN:1730 4; Feedings:472]  Out: 1219 [Urine:2311; Drains:1132; Blood:100]    Stool:      Nutrition:        Diet Orders            Start     Ordered    18 0943  Diet Enteral/Parenteral; Tube Feeding No Oral Diet; Jevity 1 2 Claude; 60  Diet effective now     Comments:  Start at 20 ml/hr and advance by 10 ml/hr every 4 hours to goal   Question Answer Comment   Diet Type Enteral/Parenteral    Enteral/Parenteral Tube Feeding No Oral Diet    Tube Feeding Formula: Jevity 1 2 Claude    Tube Feeding Continuous rate (mL/hr): 60    RD to adjust diet per protocol? No        18 0946            Labs:     Results from last 7 days  Lab Units 18  0425 18  0431 18  2118  18  0427   WBC Thousand/uL 15 14* 17 56* 14 10*  --  14 61*   HEMOGLOBIN g/dL 8 9* 10 9* 10 4*  --  10 7*   I STAT HEMOGLOBIN   --   --   --   < >  --    HEMATOCRIT % 26 9* 32 3* 31 2*  --  33 2*   PLATELETS Thousands/uL 642* 573* 589*  --  572*   MONO PCT MAN %  --  3* 5  --  7   < > = values in this interval not displayed      Results from last 7 days  Lab Units 18  0425 18  0431 18  0594  18  4209 SODIUM mmol/L 141 136 139  < > 137   POTASSIUM mmol/L 4 1 4 6 4 5  < > 4 9   CHLORIDE mmol/L 106 103 105  < > 102   CO2 mmol/L 30 29 28  < > 31   BUN mg/dL 28* 24 21  < > 21   CREATININE mg/dL 0 38* 0 53* 0 43*  < > 0 37*   CALCIUM mg/dL 9 5 9 9 10 0  < > 10 2*   TOTAL PROTEIN g/dL  --   --   --   --  5 3*   BILIRUBIN TOTAL mg/dL  --   --   --   --  0 91   ALK PHOS U/L  --   --   --   --  177*   ALT U/L  --   --   --   --  61   AST U/L  --   --   --   --  28   GLUCOSE RANDOM mg/dL 156* 200* 108  < > 109   GLUCOSE, ISTAT   --   --   --   < >  --    < > = values in this interval not displayed  Results from last 7 days  Lab Units 01/27/18  0425 01/26/18  0431 01/25/18 2118   MAGNESIUM mg/dL 2 0 2 0 1 7       Results from last 7 days  Lab Units 01/27/18  0425 01/25/18  0427   PHOSPHORUS mg/dL 3 0 4 1        Results from last 7 days  Lab Units 01/24/18 1954   INR  1 26*   PTT seconds 28       Results from last 7 days  Lab Units 01/25/18 2118 01/24/18 1954   LACTIC ACID mmol/L 1 3 1 5           Results from last 7 days  Lab Units 01/25/18 2252 01/25/18 2125   PH ART  7 484* 7 508*   PCO2 ART mm Hg 33 6* 32 4*   PO2 ART mm Hg 165 1* 165 0*   HCO3 ART mmol/L 24 7 25 2   BASE EXC ART mmol/L 1 6 2 5   ABG SOURCE  Line, Arterial Line, Arterial               Imaging:   Xr Chest Portable    Result Date: 1/25/2018  Impression: No active disease  Workstation performed: [id number]     Xr Abdomen 1 View Kub    Result Date: 1/25/2018  Impression: Fluoroscopic guidance for placement of feeding tube with tip in the 1st portion of the duodenum  Please refer to the separate procedure notes for additional details  Workstation performed: [id number]     Ct Head Wo Contrast    Result Date: 1/25/2018  Impression: No acute intracranial abnormality  Right frontal approach catheter terminating in the region of the third ventricle  Ventricular system is decompressed    No evidence for interstitial edema or extra-axial fluid collections  Workstation performed: [id number]     Ct Chest Abdomen Pelvis W Contrast    Result Date: 1/25/2018  Impression: Extensive perihepatic subcapsular abscess which is in communication with a right paracolic gutter abscess  Drainage catheter within the left subhepatic space does not appear to communicate with this abscess  Large perisplenic abscess with mass effect on the spleen  Peripheral hypodensities within the spleen likely represent splenic infarcts  Infectious involvement less likely but not excluded  Large left paracolic gutter abscess  Mesenteric abscess as described  Pelvic abscess as described, pelvic drainage catheter is anterior to the abscess  Patient is status post placement of esophageal stent as well as gastric sleeve procedure  No evidence of extravasated oral contrast is identified on this exam  Dependent lower lobe atelectasis and small bilateral pleural effusions  Extensive subcutaneous edema suggesting anasarca  Soft tissue gas identified within the left axillary soft tissues, correlate for a potential iatrogenic etiology  If this does not exist, an infectious process related to a gas-forming organism is should be excluded  I personally discussed this study with Omar Ibarra on 1/25/2018 11:58 AM  Workstation performed: [id number]     SY Shunt Series    Result Date: 1/25/2018  Impression: Intact  shunt catheter   Workstation performed: [id number]       Micro:   Blood Culture: No results found for: Pili Jose  Urine Culture: No results found for: URINECX  Sputum Culture: No components found for: SPUTUMCX  Wound Culure:   Lab Results   Component Value Date    WOUNDCULT 2+ Growth of Enterococcus species (A) 01/25/2018       Results from last 7 days  Lab Units 01/25/18  1851 01/25/18  1819 01/25/18  1704   GRAM STAIN RESULT  No Polys or Bacteria seen 3+ Polys  No bacteria seen 1+ Polys  No bacteria seen   WOUND CULTURE   --  2+ Growth of Enterococcus species*  -- Allergies: Allergies   Allergen Reactions    Benadryl [Diphenhydramine] Swelling    Phenergan [Promethazine] Hives       Medications:   Scheduled Meds:    ampicillin-sulbactam 3 g Intravenous Q6H   heparin (porcine) 5,000 Units Subcutaneous Q8H [address]   insulin lispro 1-5 Units Subcutaneous Q6H [address]   micafungin 100 mg Intravenous Q24H   pantoprazole 40 mg Intravenous Q12H MYRNA   senna-docusate sodium 1 tablet Oral HS     Continuous Infusions:    multi-electrolyte 100 mL/hr Last Rate: 100 mL/hr (01/26/18 0746)     PRN Meds:    HYDROmorphone 1 mg Q2H PRN       Invasive lines and devices:   Invasive Devices     Peripherally Inserted Central Catheter Line            PICC Line 01/36/45 Right Basilic 6 days          Arterial Line            Arterial Line 01/25/18 Left Radial 1 day          Drain            Closed/Suction Drain Left LLQ Bulb 19 Fr  1 day    Closed/Suction Drain Left LUQ Bulb 19 Fr  1 day    Closed/Suction Drain Right RLQ Bulb 19 Fr  1 day    Closed/Suction Drain Right RUQ Accordion 19 Fr  1 day    NG/OG/Enteral Tube Nasogastric 12 Fr Left nares 1 day    Urethral Catheter Latex 16 Fr  1 day    Ventriculostomy/Subdural Ventricular drainage catheter Right 1 day                Code Status: Level 1 - Full Code        SIGNATURE: Xu Celaya MD  DATE: January 27, 2018  TIME: 6:50 AM

## 2018-01-27 NOTE — PROGRESS NOTES
Progress Note - Neurosurgery   Leslie Mcfarland 36 y o  female MRN: [id number]  Unit/Bed#: ICU 10 Encounter: [id number]    Assessment:  65-year-old postop day 2 externalization of right sided ventriculoperitoneal shunt  Neurologically grossly intact  Denies any headaches  Denies any changes in vision  A total of 15 min was spent with the patient, of which more than 50% was spent in direct counseling coordination of care  Plan:  1  Maintain EVD at 8 mm of mercury  2   Management of abdominal drains as per surgical Critical Care  3   Monitor neurological examination closely  4   Follow up on CSF cultures  5   Plan to attempt to wean off EVD later next week  If internalization of the shunt is required, this would likely require a pleural or atrial shunt  6   Neurosurgery will continue to follow  VTE Prophylaxis: Sequential compression device (Venodyne)  and Heparin    Subjective/Objective   Chief Complaint:  None  Vitals: Blood pressure 128/83, pulse 88, temperature 97 6 °F (36 4 °C), temperature source Oral, resp  rate 18, height 5' 4" (1 626 m), weight 70 9 kg (156 lb 4 9 oz), SpO2 100 %, not currently breastfeeding  ,Body mass index is 26 83 kg/m²  Hemodynamic Monitoring: MAP: Arterial Line MAP (mmHg): 104 mmHg    Physical Exam:   Awake, alert oriented x3  Speech clear  Full EOM  Full FOV  Right anterior chest dressing clean and dry  Full power in upper and lower extremities  No sensory extinction or neglect  110 cc of mildly xanthochromic CSF in 24 hours      Intake/Output       01/27/18 0701 - 01/28/18 0700      [id number] 9765-4577 Total       Intake    I V   205  -- 205    Total Intake 205 -- 205       Output    Total Output -- -- --       Net I/O     205 -- 205          Invasive Devices     Peripherally Inserted Central Catheter Line            PICC Line 40/95/32 Right Basilic 6 days          Arterial Line            Arterial Line 01/25/18 Left Radial 1 day          Drain Closed/Suction Drain Left LLQ Bulb 19 Fr  1 day    Closed/Suction Drain Left LUQ Bulb 19 Fr  1 day    Closed/Suction Drain Right RLQ Bulb 19 Fr  1 day    Closed/Suction Drain Right RUQ Accordion 19 Fr  1 day    NG/OG/Enteral Tube Nasogastric 12 Fr Left nares 1 day    Urethral Catheter Latex 16 Fr  1 day    Ventriculostomy/Subdural Ventricular drainage catheter Right 1 day                          Lab Results:   I have personally reviewed pertinent results  Lab Results   Component Value Date    WBC 15 14 (H) 01/27/2018    HGB 8 9 (L) 01/27/2018    HCT 26 9 (L) 01/27/2018    MCV 91 01/27/2018     (H) 01/27/2018    MCH 30 0 01/27/2018    MCHC 33 1 01/27/2018    RDW 14 6 01/27/2018    MPV 9 8 01/27/2018    NRBC 0 01/27/2018     01/27/2018     01/27/2018    CO2 30 01/27/2018    ANIONGAP 5 01/27/2018    BUN 28 (H) 01/27/2018    CREATININE 0 38 (L) 01/27/2018    GLUCOSE 156 (H) 01/27/2018    CALCIUM 9 5 01/27/2018    EGFR 130 01/27/2018       Imaging Studies: I have personally reviewed pertinent reports  and I have personally reviewed pertinent films in PACS    Other Studies:  CSF culture from January 25, 2018, no growth

## 2018-01-28 ENCOUNTER — APPOINTMENT (INPATIENT)
Dept: RADIOLOGY | Facility: HOSPITAL | Age: 43
DRG: 711 | End: 2018-01-28
Payer: COMMERCIAL

## 2018-01-28 LAB
ANION GAP SERPL CALCULATED.3IONS-SCNC: 4 MMOL/L (ref 4–13)
BACTERIA CATH TIP CULT: ABNORMAL
BACTERIA CSF CULT: NO GROWTH
BACTERIA TISS AEROBE CULT: ABNORMAL
BACTERIA TISS AEROBE CULT: ABNORMAL
BACTERIA WND AEROBE CULT: ABNORMAL
BASOPHILS # BLD MANUAL: 0 THOUSAND/UL (ref 0–0.1)
BASOPHILS NFR MAR MANUAL: 0 % (ref 0–1)
BUN SERPL-MCNC: 24 MG/DL (ref 5–25)
CALCIUM SERPL-MCNC: 10.1 MG/DL (ref 8.3–10.1)
CHLORIDE SERPL-SCNC: 104 MMOL/L (ref 100–108)
CO2 SERPL-SCNC: 33 MMOL/L (ref 21–32)
CREAT SERPL-MCNC: 0.36 MG/DL (ref 0.6–1.3)
EOSINOPHIL # BLD MANUAL: 0.27 THOUSAND/UL (ref 0–0.4)
EOSINOPHIL NFR BLD MANUAL: 2 % (ref 0–6)
ERYTHROCYTE [DISTWIDTH] IN BLOOD BY AUTOMATED COUNT: 14.5 % (ref 11.6–15.1)
GFR SERPL CREATININE-BSD FRML MDRD: 132 ML/MIN/1.73SQ M
GLUCOSE SERPL-MCNC: 70 MG/DL (ref 65–140)
GLUCOSE SERPL-MCNC: 88 MG/DL (ref 65–140)
GLUCOSE SERPL-MCNC: 89 MG/DL (ref 65–140)
GLUCOSE SERPL-MCNC: 94 MG/DL (ref 65–140)
GRAM STN SPEC: ABNORMAL
HCT VFR BLD AUTO: 26.4 % (ref 34.8–46.1)
HGB BLD-MCNC: 8.7 G/DL (ref 11.5–15.4)
LYMPHOCYTES # BLD AUTO: 0.27 THOUSAND/UL (ref 0.6–4.47)
LYMPHOCYTES # BLD AUTO: 2 % (ref 14–44)
MAGNESIUM SERPL-MCNC: 1.7 MG/DL (ref 1.6–2.6)
MCH RBC QN AUTO: 30.1 PG (ref 26.8–34.3)
MCHC RBC AUTO-ENTMCNC: 33 G/DL (ref 31.4–37.4)
MCV RBC AUTO: 91 FL (ref 82–98)
METAMYELOCYTES NFR BLD MANUAL: 2 % (ref 0–1)
MONOCYTES # BLD AUTO: 0.27 THOUSAND/UL (ref 0–1.22)
MONOCYTES NFR BLD: 2 % (ref 4–12)
MYELOCYTES NFR BLD MANUAL: 1 % (ref 0–1)
NEUTROPHILS # BLD MANUAL: 12.05 THOUSAND/UL (ref 1.85–7.62)
NEUTS SEG NFR BLD AUTO: 89 % (ref 43–75)
NRBC BLD AUTO-RTO: 0 /100 WBCS
PHOSPHATE SERPL-MCNC: 3.9 MG/DL (ref 2.7–4.5)
PLATELET # BLD AUTO: 714 THOUSANDS/UL (ref 149–390)
PLATELET BLD QL SMEAR: ABNORMAL
PMV BLD AUTO: 9.3 FL (ref 8.9–12.7)
POTASSIUM SERPL-SCNC: 4.4 MMOL/L (ref 3.5–5.3)
RBC # BLD AUTO: 2.89 MILLION/UL (ref 3.81–5.12)
SODIUM SERPL-SCNC: 141 MMOL/L (ref 136–145)
VARIANT LYMPHS # BLD AUTO: 2 %
WBC # BLD AUTO: 13.54 THOUSAND/UL (ref 4.31–10.16)

## 2018-01-28 PROCEDURE — 99024 POSTOP FOLLOW-UP VISIT: CPT | Performed by: NEUROLOGICAL SURGERY

## 2018-01-28 PROCEDURE — 83735 ASSAY OF MAGNESIUM: CPT | Performed by: PHYSICIAN ASSISTANT

## 2018-01-28 PROCEDURE — 99232 SBSQ HOSP IP/OBS MODERATE 35: CPT | Performed by: INTERNAL MEDICINE

## 2018-01-28 PROCEDURE — C9113 INJ PANTOPRAZOLE SODIUM, VIA: HCPCS | Performed by: EMERGENCY MEDICINE

## 2018-01-28 PROCEDURE — 85007 BL SMEAR W/DIFF WBC COUNT: CPT | Performed by: PHYSICIAN ASSISTANT

## 2018-01-28 PROCEDURE — 74220 X-RAY XM ESOPHAGUS 1CNTRST: CPT

## 2018-01-28 PROCEDURE — 80048 BASIC METABOLIC PNL TOTAL CA: CPT | Performed by: PHYSICIAN ASSISTANT

## 2018-01-28 PROCEDURE — 84100 ASSAY OF PHOSPHORUS: CPT | Performed by: PHYSICIAN ASSISTANT

## 2018-01-28 PROCEDURE — 85027 COMPLETE CBC AUTOMATED: CPT | Performed by: PHYSICIAN ASSISTANT

## 2018-01-28 PROCEDURE — 82948 REAGENT STRIP/BLOOD GLUCOSE: CPT

## 2018-01-28 RX ADMIN — Medication 3 G: at 09:38

## 2018-01-28 RX ADMIN — Medication 3 G: at 16:38

## 2018-01-28 RX ADMIN — PANTOPRAZOLE SODIUM 40 MG: 40 INJECTION, POWDER, FOR SOLUTION INTRAVENOUS at 20:03

## 2018-01-28 RX ADMIN — SODIUM CHLORIDE, SODIUM GLUCONATE, SODIUM ACETATE, POTASSIUM CHLORIDE AND MAGNESIUM CHLORIDE 100 ML/HR: 526; 502; 368; 37; 30 INJECTION, SOLUTION INTRAVENOUS at 18:56

## 2018-01-28 RX ADMIN — HYDROMORPHONE HYDROCHLORIDE 0.5 MG: 1 INJECTION, SOLUTION INTRAMUSCULAR; INTRAVENOUS; SUBCUTANEOUS at 13:01

## 2018-01-28 RX ADMIN — PANTOPRAZOLE SODIUM 40 MG: 40 INJECTION, POWDER, FOR SOLUTION INTRAVENOUS at 08:22

## 2018-01-28 RX ADMIN — IOHEXOL 25 ML: 350 INJECTION, SOLUTION INTRAVENOUS at 10:30

## 2018-01-28 RX ADMIN — Medication 3 G: at 21:22

## 2018-01-28 RX ADMIN — HYDROMORPHONE HYDROCHLORIDE 0.5 MG: 1 INJECTION, SOLUTION INTRAMUSCULAR; INTRAVENOUS; SUBCUTANEOUS at 01:30

## 2018-01-28 RX ADMIN — HYDROMORPHONE HYDROCHLORIDE 1 MG: 1 INJECTION, SOLUTION INTRAMUSCULAR; INTRAVENOUS; SUBCUTANEOUS at 14:53

## 2018-01-28 RX ADMIN — HYDROMORPHONE HYDROCHLORIDE 0.5 MG: 1 INJECTION, SOLUTION INTRAMUSCULAR; INTRAVENOUS; SUBCUTANEOUS at 17:54

## 2018-01-28 RX ADMIN — Medication 1 TABLET: at 21:21

## 2018-01-28 RX ADMIN — HYDROMORPHONE HYDROCHLORIDE 0.5 MG: 1 INJECTION, SOLUTION INTRAMUSCULAR; INTRAVENOUS; SUBCUTANEOUS at 21:21

## 2018-01-28 RX ADMIN — HYDROMORPHONE HYDROCHLORIDE 1 MG: 1 INJECTION, SOLUTION INTRAMUSCULAR; INTRAVENOUS; SUBCUTANEOUS at 19:15

## 2018-01-28 RX ADMIN — HEPARIN SODIUM 5000 UNITS: 5000 INJECTION, SOLUTION INTRAVENOUS; SUBCUTANEOUS at 21:21

## 2018-01-28 RX ADMIN — SODIUM CHLORIDE, SODIUM GLUCONATE, SODIUM ACETATE, POTASSIUM CHLORIDE AND MAGNESIUM CHLORIDE 100 ML/HR: 526; 502; 368; 37; 30 INJECTION, SOLUTION INTRAVENOUS at 08:15

## 2018-01-28 RX ADMIN — HYDROMORPHONE HYDROCHLORIDE 1 MG: 1 INJECTION, SOLUTION INTRAMUSCULAR; INTRAVENOUS; SUBCUTANEOUS at 02:33

## 2018-01-28 RX ADMIN — HYDROMORPHONE HYDROCHLORIDE 0.5 MG: 1 INJECTION, SOLUTION INTRAMUSCULAR; INTRAVENOUS; SUBCUTANEOUS at 05:00

## 2018-01-28 RX ADMIN — Medication 3 G: at 04:02

## 2018-01-28 RX ADMIN — HYDROMORPHONE HYDROCHLORIDE 1 MG: 1 INJECTION, SOLUTION INTRAMUSCULAR; INTRAVENOUS; SUBCUTANEOUS at 22:50

## 2018-01-28 RX ADMIN — HEPARIN SODIUM 5000 UNITS: 5000 INJECTION, SOLUTION INTRAVENOUS; SUBCUTANEOUS at 14:07

## 2018-01-28 RX ADMIN — HYDROMORPHONE HYDROCHLORIDE 1 MG: 1 INJECTION, SOLUTION INTRAMUSCULAR; INTRAVENOUS; SUBCUTANEOUS at 10:56

## 2018-01-28 RX ADMIN — HEPARIN SODIUM 5000 UNITS: 5000 INJECTION, SOLUTION INTRAVENOUS; SUBCUTANEOUS at 05:01

## 2018-01-28 RX ADMIN — HYDROMORPHONE HYDROCHLORIDE 1 MG: 1 INJECTION, SOLUTION INTRAMUSCULAR; INTRAVENOUS; SUBCUTANEOUS at 08:21

## 2018-01-28 NOTE — PROGRESS NOTES
Talked with [facility] resident [facility] about pt's BP  Per Jose, BP of 180 or < is acceptable

## 2018-01-28 NOTE — PROGRESS NOTES
Progress Note - Critical Care   Rosaura Sinclair 36 y o  female MRN: [id number]  Unit/Bed#: ICU 10 Encounter: [id number]    Impression:  Principal Problem:    Gastric leak  Active Problems:    Acute peritonitis ([address] )    Idiopathic intracranial hypertension    S/P  shunt    Peritonitis (HCC)     (ventriculoperitoneal) shunt status    Murmur, cardiac      Plan:    Neuro:   · Pain controlled with: Fentanyl gtt  Fentanyl and dilaudid PRN  ·  shunt 2/2 pseudotumor cerebri   · s/p externalization 1/25 - draining  ~60ml/day  · Purulent drainage intra-op - positive for enterococcus  · Regulate sleep/wake cycle  · Trend neuro exam; GCS 15; non-focal exam today    CV:   Hemodynamically stable  Not requiring pressors at this time  · Grade 3/6 systolic murmur - ECHO EF 65% with no valvular regurg/stenosis  · MAP's > 65    Resp:   · Extubated; pulmonary toilet, incentive spirometry    GI:   · Peritonitis 2/2 GE junction leak  · S/p EGD 1/24 with stent placed  · S/p ex-lap 1/25 for abdominal abscess and fluid collections  · 4 ADENIKE drains in place - all serosanguinous   · RUQ - 55ml  · RLQ - 45ml - dark serosanguinous drainage   · LUQ - 34ml  · LLQ -  44ml  · Keofed removed 2/2 coiling in mouth - currently NPO  Formal barium swallow study today with plan to advance diet  · Stress ulcer prophylaxis: Protonix IV  · Bowel regimen: consider starting today    F/E/N:   · Fluid/Diuretic plan: isolyte 100ml/hr  · Nutrition/diet plan: NPO  · Replete electrolytes with goals: K >4 0, Mag >2 0, and Phos >3 0    :   · Indwelling Giles present: D/C yesterday  · 970 ml UOP in 24 hrs   · Trend UOP and BUN/creat  · Strict I and O    ID:   · Leukocytosis trending down - 13 54  · Source of infection: abdominal abscess  · Abx ordered: Unasyn for enterococcus; likely for 4-6 weeks' appreciate ID recs  · Trend temps and WBC count  · Maintain normothermia    Heme:   · Hemoglobin 8 7 down from 8 9 yesterday; trend    · HD stable   · Sub q heparin restarted on 01/27    Endo:   · Glycemic control plan: Algorithm 2    MSK/Skin:  · Frequent turning and pressure off-loading  · Local wound care as needed    Lines:  · Central venous access: R basilic PICC  · Arterial line: L radial  · Peripheral x1    VTE Prophylaxis:  · Pharmacologic Prophylaxis: Sub-q heparin  · Mechanical Prophylaxis: sequential compression device    Disposition: Continue ICU care    Consultants: General surgery, Neurosurg    Reason for Admission:     HPI/24hr events: 37 yo female who presents for sepsis  She had a gastric sleeve in 2016 and a  shunt in 2017 for IIH  She recently underwent a hiatal hernia repair on 1/11  She presented to OSH on 1/14 with peritonitis and septic shock, and was taken for emergent ex-lap and was found to have gastric perf - which was repaired  Multiple drains were placed and was started on broad spectrum abx  POD #9 showed large fluid collection in pelvis and leak around GE junction was found  Transferred here for stenting    Nothing significant overnight  Barium swallow today    Physical Exam:    Physical Exam   Constitutional: She is oriented to person, place, and time  Vital signs are normal  She appears well-developed  She is easily aroused  No distress  HENT:   Head: Normocephalic and atraumatic  Neck: Normal range of motion  Neck supple  No JVD present  No tracheal deviation present  Cardiovascular: Normal rate and regular rhythm  Murmur heard  Systolic murmur is present with a grade of 4/6   Pulmonary/Chest: Effort normal and breath sounds normal  No respiratory distress  Abdominal: Soft  She exhibits no distension  Bowel sounds are decreased  There is tenderness  Midline insicison with 4 ADENIKE drains in place  Abdomen soft, not distended with appropriate tenderness   Neurological: She is alert, oriented to person, place, and time and easily aroused  She has normal strength  No cranial nerve deficit or sensory deficit  GCS eye subscore is 4   GCS verbal subscore is 5  GCS motor subscore is 6  Skin: Skin is warm and dry  She is not diaphoretic  Psychiatric: She has a normal mood and affect  Her behavior is normal        Vitals:   Vitals:    18 0400 18 0500 18 0530 18 0600   BP:       BP Location:       Pulse: 86 90 82 82   Resp: (!) 9 21 (!) 10 16   Temp: 98 2 °F (36 8 °C)      TempSrc: Oral      SpO2: 100% 100% 100% 100%   Weight:    70 3 kg (154 lb 15 7 oz)   Height:         Arterial Line BP: 168/76  Arterial Line MAP (mmHg): 108 mmHg    Temperature: Temp (24hrs), Av 2 °F (36 8 °C), Min:97 6 °F (36 4 °C), Max:98 4 °F (36 9 °C)  Current: Temperature: 98 2 °F (36 8 °C)    Weights: IBW: 54 7 kg  Body mass index is 26 6 kg/m²  Hemodynamic Monitoring:  N/A       Respiratory:  SpO2: SpO2: 100 %, SpO2 Activity: SpO2 Activity: At Rest, SpO2 Device: O2 Device: Nasal cannula  O2 Flow Rate (L/min): 2 L/min (applied at this time)    Intake and Outputs:    Intake/Output Summary (Last 24 hours) at 18 0700  Last data filed at 18 0600   Gross per 24 hour   Intake           2875 4 ml   Output             1209 ml   Net           1666 4 ml           Nutrition:        Diet Orders            Start     Ordered    18 1717  Diet NPO  Diet effective now     Question Answer Comment   Diet Type NPO    RD to adjust diet per protocol?  No        18 1716            Labs:     Results from last 7 days  Lab Units 18  0507 18  1141 18  0425 18  0431 18  2118   WBC Thousand/uL 13 54*  --  15 14* 17 56* 14 10*   HEMOGLOBIN g/dL 8 7* 8 8* 8 9* 10 9* 10 4*   HEMATOCRIT % 26 4* 26 8* 26 9* 32 3* 31 2*   PLATELETS Thousands/uL 714*  --  642* 573* 589*   MONO PCT MAN %  --   --  2* 3* 5       Results from last 7 days  Lab Units 18  0507 18  0425 18  0431  18  2244   SODIUM mmol/L 141 141 136  < > 137   POTASSIUM mmol/L 4 4 4 1 4 6  < > 4 9   CHLORIDE mmol/L 104 106 103  < > 102   CO2 mmol/L 33* 30 29  < > 31   BUN mg/dL 24 28* 24  < > 21   CREATININE mg/dL 0 36* 0 38* 0 53*  < > 0 37*   CALCIUM mg/dL 10 1 9 5 9 9  < > 10 2*   TOTAL PROTEIN g/dL  --   --   --   --  5 3*   BILIRUBIN TOTAL mg/dL  --   --   --   --  0 91   ALK PHOS U/L  --   --   --   --  177*   ALT U/L  --   --   --   --  61   AST U/L  --   --   --   --  28   GLUCOSE RANDOM mg/dL 88 156* 200*  < > 109   GLUCOSE, ISTAT   --   --   --   < >  --    < > = values in this interval not displayed  Results from last 7 days  Lab Units 01/28/18  0507 01/27/18  0425 01/26/18  0431   MAGNESIUM mg/dL 1 7 2 0 2 0       Results from last 7 days  Lab Units 01/28/18  0507 01/27/18  0425 01/25/18  0427   PHOSPHORUS mg/dL 3 9 3 0 4 1        Results from last 7 days  Lab Units 01/24/18 1954   INR  1 26*   PTT seconds 28       Results from last 7 days  Lab Units 01/25/18  2118 01/24/18 1954   LACTIC ACID mmol/L 1 3 1 5           Results from last 7 days  Lab Units 01/25/18 2252 01/25/18 2125   PH ART  7 484* 7 508*   PCO2 ART mm Hg 33 6* 32 4*   PO2 ART mm Hg 165 1* 165 0*   HCO3 ART mmol/L 24 7 25 2   BASE EXC ART mmol/L 1 6 2 5   ABG SOURCE  Line, Arterial Line, Arterial               Imaging:   Xr Chest Portable    Result Date: 1/25/2018  Impression: No active disease  Workstation performed: [id number]     Xr Abdomen 1 View Kub    Result Date: 1/25/2018  Impression: Fluoroscopic guidance for placement of feeding tube with tip in the 1st portion of the duodenum  Please refer to the separate procedure notes for additional details  Workstation performed: [id number]     Ct Head Wo Contrast    Result Date: 1/25/2018  Impression: No acute intracranial abnormality  Right frontal approach catheter terminating in the region of the third ventricle  Ventricular system is decompressed  No evidence for interstitial edema or extra-axial fluid collections   Workstation performed: [id number]     Ct Chest Abdomen Pelvis W Contrast    Result Date: 1/25/2018  Impression: Extensive perihepatic subcapsular abscess which is in communication with a right paracolic gutter abscess  Drainage catheter within the left subhepatic space does not appear to communicate with this abscess  Large perisplenic abscess with mass effect on the spleen  Peripheral hypodensities within the spleen likely represent splenic infarcts  Infectious involvement less likely but not excluded  Large left paracolic gutter abscess  Mesenteric abscess as described  Pelvic abscess as described, pelvic drainage catheter is anterior to the abscess  Patient is status post placement of esophageal stent as well as gastric sleeve procedure  No evidence of extravasated oral contrast is identified on this exam  Dependent lower lobe atelectasis and small bilateral pleural effusions  Extensive subcutaneous edema suggesting anasarca  Soft tissue gas identified within the left axillary soft tissues, correlate for a potential iatrogenic etiology  If this does not exist, an infectious process related to a gas-forming organism is should be excluded  I personally discussed this study with Mick Adrian on 1/25/2018 11:58 AM  Workstation performed: [id number]     KJ Shunt Series    Result Date: 1/25/2018  Impression: Intact  shunt catheter  Workstation performed: [id number]       lab    Micro:   Blood Culture: No results found for: BLOODCX  Urine Culture: No results found for: URINECX  Sputum Culture: No components found for: SPUTUMCX  Wound Culure:   Lab Results   Component Value Date    WOUNDCULT 2+ Growth of Enterococcus species (A) 01/25/2018       Results from last 7 days  Lab Units 01/25/18  1851 01/25/18  1819 01/25/18  1704   GRAM STAIN RESULT  No Polys or Bacteria seen 3+ Polys  No bacteria seen 1+ Polys  No bacteria seen   WOUND CULTURE   --  2+ Growth of Enterococcus species*  --        Allergies:    Allergies   Allergen Reactions    Benadryl [Diphenhydramine] Swelling    Phenergan [Promethazine] Hives       Medications:   Scheduled Meds:    ampicillin-sulbactam 3 g Intravenous Q6H   heparin (porcine) 5,000 Units Subcutaneous Q8H [address]   insulin lispro 1-5 Units Subcutaneous Q6H [address]   pantoprazole 40 mg Intravenous Q12H [address]   senna-docusate sodium 1 tablet Oral HS     Continuous Infusions:    multi-electrolyte 100 mL/hr Last Rate: 100 mL/hr (01/27/18 1925)     PRN Meds:    HYDROmorphone 0 5 mg Q2H PRN   HYDROmorphone 1 mg Q2H PRN       Invasive lines and devices:   Invasive Devices     Peripherally Inserted Central Catheter Line            PICC Line 11/14/72 Right Basilic 7 days          Arterial Line            Arterial Line 01/25/18 Left Radial 2 days          Drain            Closed/Suction Drain Left LLQ Bulb 19 Fr  2 days    Closed/Suction Drain Left LUQ Bulb 19 Fr  2 days    Closed/Suction Drain Right RLQ Bulb 19 Fr  2 days    Closed/Suction Drain Right RUQ Accordion 19 Fr  2 days    Ventriculostomy/Subdural Ventricular drainage catheter Right 2 days                Code Status: Level 1 - Full Code        SIGNATURE: Carly Barnes MD  DATE: January 28, 2018  TIME: 7:00 AM

## 2018-01-28 NOTE — PROGRESS NOTES
Progress Note - Neurosurgery   Josuha Serrato 36 y o  female MRN: [id number]  Unit/Bed#: ICU 10 Encounter: [id number]    Assessment:  44-year-old postop day 3 externalization of right-sided ventriculoperitoneal shunt  Neurologically grossly intact  Denies any headaches or changes in vision  A total of 15 min was spent with the patient, of which more than 50% was spent in direct counseling coordination of care  Plan:  1  Maintain EVD at 8 mm of mercury  Consider increasing or clamping later this week to trial shunt dependence  2   Management of abdominal drains as per Surgical Critical Care  3   Continue to monitor neurological examination  4   Antimicrobial is as per ID   5   Neurosurgery will continue to follow  VTE Prophylaxis: Sequential compression device (Venodyne)  and Heparin    Subjective/Objective   Chief Complaint:  None  Vitals: Blood pressure 152/84, pulse 82, temperature 98 3 °F (36 8 °C), temperature source Oral, resp  rate 18, height 5' 4" (1 626 m), weight 70 3 kg (154 lb 15 7 oz), SpO2 100 %, not currently breastfeeding  ,Body mass index is 26 6 kg/m²  Hemodynamic Monitoring: MAP: Arterial Line MAP (mmHg): 108 mmHg    Physical Exam:   Awake, alert resting in ICU bed 10  Oriented x3  Speech clear  Full FOV  Full EOM  Full power in upper and lower extremities  No drift  No sensory extinction or neglect  60 cc of clear CSF from externalized  shunt  Invasive Devices     Peripherally Inserted Central Catheter Line            PICC Line 52/20/95 Right Basilic 7 days          Arterial Line            Arterial Line 01/25/18 Left Radial 2 days          Drain            Closed/Suction Drain Left LLQ Bulb 19 Fr  2 days    Closed/Suction Drain Left LUQ Bulb 19 Fr  2 days    Closed/Suction Drain Right RLQ Bulb 19 Fr  2 days    Closed/Suction Drain Right RUQ Accordion 19 Fr  2 days    Ventriculostomy/Subdural Ventricular drainage catheter Right 2 days              Lab Results:    I have personally reviewed pertinent results  Lab Results   Component Value Date    WBC 13 54 (H) 01/28/2018    HGB 8 7 (L) 01/28/2018    HCT 26 4 (L) 01/28/2018    MCV 91 01/28/2018     (H) 01/28/2018    MCH 30 1 01/28/2018    MCHC 33 0 01/28/2018    RDW 14 5 01/28/2018    MPV 9 3 01/28/2018    NRBC 0 01/28/2018     01/28/2018     01/28/2018    CO2 33 (H) 01/28/2018    ANIONGAP 4 01/28/2018    BUN 24 01/28/2018    CREATININE 0 36 (L) 01/28/2018    GLUCOSE 88 01/28/2018    CALCIUM 10 1 01/28/2018    EGFR 132 01/28/2018       Imaging Studies: I have personally reviewed pertinent reports  and I have personally reviewed pertinent films in PACS    Other Studies:  CSF dated January 25, 2018, no bacteria

## 2018-01-28 NOTE — PROGRESS NOTES
Progress Note - Infectious Disease   Akila Perez 36 y o  female MRN: [id number]  Unit/Bed#: ICU 10 Encounter: [id number]      Impression/Recommendations:  1   Intra-abdominal/pelvic abscesses   Patient is now status post repeat abdominal washout   She has multiple drains in place   She is clinically well and systemically stable  Operative culture from [facility], growing only ampicillin susceptible Enterococcus   Operative culture here all growing ampicillin susceptible Enterococcus  Continue with IV Unasyn  Follow-up on final operative culture here      2   Possible infected  shunt   Given presence of tip of  shunt in peritoneal space, there is high probability of  shunt infection   Fortunately, patient has no symptoms concerning for meningitis   She has neurological deficits   She is status post tapping of  shunt at [facility]   CSF culture there had no growth but patient had prior antibiotic  Bobby Duran will go ahead and treat her for possible/presumptive  shunt infection   Patient is now status post  shunt externalization  CSF culture from 01/25 is negative       Antibiotic plan as in above  Likely treat with IV antibiotic x 4-6 weeks      3   Gastric perforation, status post repair  Beth Jessica has persistent GE junction leak   She is now status post placement of esophageal stent  Patient may need another upper GI down the line to assess for any persistent leak  For video barium swallow today      4   Septic shock, on presentation at [facility]   This is secondary to gastric perforation   Patient is now hemodynamically stable   All cultures from [facility] obtained and reviewed, now on chart   All blood cultures were negative   Urine culture negative   CSF culture negative   Operative culture positive for Enterococcus, as in above  Antibiotic plan as in above    Monitor hemodynamics      Discussed with the patient and her fiance in detail regarding above plan      Antibiotics:  Unasyn  POD # 3    Subjective:  Overall much improved  Has no complaints this morning  No shortness of breath or cough  Denies fevers, chills, or sweats  Denies nausea, vomiting, or diarrhea  Going for video barium swallow today  Objective:  Vitals:  HR:  [82-96] 82  Resp:  [9-31] 18  BP: (152)/(84) 152/84  SpO2:  [93 %-100 %] 100 %  Temp (24hrs), Av 3 °F (36 8 °C), Min:98 2 °F (36 8 °C), Max:98 4 °F (36 9 °C)  Current: Temperature: 98 3 °F (36 8 °C)    Physical Exam:   General:  No acute distress  Psychiatric:  Awake and alert  Pulmonary:  Normal respiratory excursion without accessory muscle use  Abdomen:  Soft, nontender, incision is clean with multiple drains in place  Extremities:  No edema  Skin:  No rashes, right chest wall PS site is clean with no warmth or purulent    Lab Results:  I have personally reviewed pertinent labs  Results from last 7 days  Lab Units 18  0507 18  0425 18  0431  18  2244   SODIUM mmol/L 141 141 136  < > 137   POTASSIUM mmol/L 4 4 4 1 4 6  < > 4 9   CHLORIDE mmol/L 104 106 103  < > 102   CO2 mmol/L 33* 30 29  < > 31   ANION GAP mmol/L 4 5 4  < > 4   BUN mg/dL 24 28* 24  < > 21   CREATININE mg/dL 0 36* 0 38* 0 53*  < > 0 37*   EGFR ml/min/1 73sq m 132 130 117  < > 131   GLUCOSE RANDOM mg/dL 88 156* 200*  < > 109   GLUCOSE, ISTAT   --   --   --   < >  --    CALCIUM mg/dL 10 1 9 5 9 9  < > 10 2*   AST U/L  --   --   --   --  28   ALT U/L  --   --   --   --  61   ALK PHOS U/L  --   --   --   --  177*   TOTAL PROTEIN g/dL  --   --   --   --  5 3*   BILIRUBIN TOTAL mg/dL  --   --   --   --  0 91   < > = values in this interval not displayed      Results from last 7 days  Lab Units 18  0507 18  1141 18  0425 18  0431   WBC Thousand/uL 13 54*  --  15 14* 17 56*   HEMOGLOBIN g/dL 8 7* 8 8* 8 9* 10 9*   PLATELETS Thousands/uL 714*  --  642* 573*       Results from last 7 days  Lab Units 18  6876 01/25/18  1819 01/25/18  1704   GRAM STAIN RESULT  No Polys or Bacteria seen 3+ Polys  No bacteria seen 1+ Polys  No bacteria seen   WOUND CULTURE   --  2+ Growth of Enterococcus faecalis*  --        Imaging Studies:   I have personally reviewed pertinent imaging study reports and images in PACS  EKG, Pathology, and Other Studies:   I have personally reviewed pertinent reports

## 2018-01-28 NOTE — PROGRESS NOTES
Progress Note - General Surgery   Hayden Robison 36 y o  female MRN: [id number]  Unit/Bed#: ICU 10 Encounter: [id number]    Assessment:  43y/o F transfer from [facility] with history of laparoscopic sleeve gastrectomy with  shunt shortening on 12/19/16, lap hiatal hernia repair on 1/11/18 , ex laparoscopic repair of gastric perforation on 1/14     - status post externalization of R ventricular-peritoneal shunt to anterior chest wall ribs 2-3 on 1/25/18  - Status post ex - lap washout and placement of drains & placement of NG feeding tube 1/25/18       Plan:  Swallow study today to evaluate leak  PO diet if swallow study negative  Antibx per ID- continue Unasyn, likely 4-6 weeks  Off Micafungin  PRN pain control- PO agents if passes swallow  Care of EVD per NSx- cx w NGTD  OOB to chair, PT/OT      Subjective/Objective   Chief Complaint: None    Subjective: No complaints  Pain controlled w/ PRN meds    Objective:     Blood pressure 152/84, pulse 82, temperature 98 2 °F (36 8 °C), temperature source Oral, resp  rate 16, height 5' 4" (1 626 m), weight 70 3 kg (154 lb 15 7 oz), SpO2 100 %, not currently breastfeeding  ,Body mass index is 26 6 kg/m²        Intake/Output Summary (Last 24 hours) at 01/28/18 0643  Last data filed at 01/28/18 0600   Gross per 24 hour   Intake           2875 4 ml   Output             1209 ml   Net           1666 4 ml       Invasive Devices     Peripherally Inserted Central Catheter Line            PICC Line 55/54/17 Right Basilic 7 days          Arterial Line            Arterial Line 01/25/18 Left Radial 2 days          Drain            Closed/Suction Drain Left LLQ Bulb 19 Fr  2 days    Closed/Suction Drain Left LUQ Bulb 19 Fr  2 days    Closed/Suction Drain Right RLQ Bulb 19 Fr  2 days    Closed/Suction Drain Right RUQ Accordion 19 Fr  2 days    Ventriculostomy/Subdural Ventricular drainage catheter Right 2 days                Physical Exam:   Gen: A&O, NAD  Cardio: RRR  Lungs: CTAB  Abd: Soft, non distended   Staples c/d/i  ADENIKE x 4 abd, serosanginous    Lab, Imaging and other studies:  CBC:   Lab Results   Component Value Date    WBC 13 54 (H) 01/28/2018    HGB 8 7 (L) 01/28/2018    HCT 26 4 (L) 01/28/2018    MCV 91 01/28/2018     (H) 01/28/2018    MCH 30 1 01/28/2018    MCHC 33 0 01/28/2018    RDW 14 5 01/28/2018    MPV 9 3 01/28/2018   , CMP:   Lab Results   Component Value Date     01/28/2018    K 4 4 01/28/2018     01/28/2018    CO2 33 (H) 01/28/2018    ANIONGAP 4 01/28/2018    BUN 24 01/28/2018    CREATININE 0 36 (L) 01/28/2018    GLUCOSE 88 01/28/2018    CALCIUM 10 1 01/28/2018    EGFR 132 01/28/2018     VTE Pharmacologic Prophylaxis: Heparin  VTE Mechanical Prophylaxis: sequential compression device

## 2018-01-29 PROBLEM — K65.9 PERITONITIS (HCC): Status: RESOLVED | Noted: 2018-01-24 | Resolved: 2018-01-29

## 2018-01-29 LAB
ANION GAP SERPL CALCULATED.3IONS-SCNC: 6 MMOL/L (ref 4–13)
ANISOCYTOSIS BLD QL SMEAR: PRESENT
BASOPHILS # BLD MANUAL: 0 THOUSAND/UL (ref 0–0.1)
BASOPHILS NFR MAR MANUAL: 0 % (ref 0–1)
BUN SERPL-MCNC: 22 MG/DL (ref 5–25)
CALCIUM SERPL-MCNC: 9.9 MG/DL (ref 8.3–10.1)
CHLORIDE SERPL-SCNC: 99 MMOL/L (ref 100–108)
CO2 SERPL-SCNC: 31 MMOL/L (ref 21–32)
CREAT SERPL-MCNC: 0.34 MG/DL (ref 0.6–1.3)
EOSINOPHIL # BLD MANUAL: 0.26 THOUSAND/UL (ref 0–0.4)
EOSINOPHIL NFR BLD MANUAL: 1 % (ref 0–6)
ERYTHROCYTE [DISTWIDTH] IN BLOOD BY AUTOMATED COUNT: 14.5 % (ref 11.6–15.1)
GFR SERPL CREATININE-BSD FRML MDRD: 135 ML/MIN/1.73SQ M
GLUCOSE SERPL-MCNC: 74 MG/DL (ref 65–140)
GLUCOSE SERPL-MCNC: 78 MG/DL (ref 65–140)
GLUCOSE SERPL-MCNC: 79 MG/DL (ref 65–140)
GLUCOSE SERPL-MCNC: 80 MG/DL (ref 65–140)
GLUCOSE SERPL-MCNC: 85 MG/DL (ref 65–140)
HCT VFR BLD AUTO: 30.9 % (ref 34.8–46.1)
HGB BLD-MCNC: 10.1 G/DL (ref 11.5–15.4)
LYMPHOCYTES # BLD AUTO: 1.02 THOUSAND/UL (ref 0.6–4.47)
LYMPHOCYTES # BLD AUTO: 4 % (ref 14–44)
MCH RBC QN AUTO: 29.6 PG (ref 26.8–34.3)
MCHC RBC AUTO-ENTMCNC: 32.7 G/DL (ref 31.4–37.4)
MCV RBC AUTO: 91 FL (ref 82–98)
MONOCYTES # BLD AUTO: 1.02 THOUSAND/UL (ref 0–1.22)
MONOCYTES NFR BLD: 4 % (ref 4–12)
NEUTROPHILS # BLD MANUAL: 23.23 THOUSAND/UL (ref 1.85–7.62)
NEUTS BAND NFR BLD MANUAL: 2 % (ref 0–8)
NEUTS SEG NFR BLD AUTO: 89 % (ref 43–75)
NRBC BLD AUTO-RTO: 0 /100 WBCS
PLATELET # BLD AUTO: 753 THOUSANDS/UL (ref 149–390)
PLATELET BLD QL SMEAR: ABNORMAL
PMV BLD AUTO: 9.1 FL (ref 8.9–12.7)
POLYCHROMASIA BLD QL SMEAR: PRESENT
POTASSIUM SERPL-SCNC: 4.1 MMOL/L (ref 3.5–5.3)
RBC # BLD AUTO: 3.41 MILLION/UL (ref 3.81–5.12)
RBC MORPH BLD: PRESENT
SODIUM SERPL-SCNC: 136 MMOL/L (ref 136–145)
WBC # BLD AUTO: 25.53 THOUSAND/UL (ref 4.31–10.16)

## 2018-01-29 PROCEDURE — 82948 REAGENT STRIP/BLOOD GLUCOSE: CPT

## 2018-01-29 PROCEDURE — 99233 SBSQ HOSP IP/OBS HIGH 50: CPT | Performed by: INTERNAL MEDICINE

## 2018-01-29 PROCEDURE — 85027 COMPLETE CBC AUTOMATED: CPT | Performed by: EMERGENCY MEDICINE

## 2018-01-29 PROCEDURE — 85007 BL SMEAR W/DIFF WBC COUNT: CPT | Performed by: EMERGENCY MEDICINE

## 2018-01-29 PROCEDURE — 80048 BASIC METABOLIC PNL TOTAL CA: CPT | Performed by: EMERGENCY MEDICINE

## 2018-01-29 PROCEDURE — 99024 POSTOP FOLLOW-UP VISIT: CPT | Performed by: SURGERY

## 2018-01-29 PROCEDURE — C9113 INJ PANTOPRAZOLE SODIUM, VIA: HCPCS | Performed by: EMERGENCY MEDICINE

## 2018-01-29 PROCEDURE — 99024 POSTOP FOLLOW-UP VISIT: CPT | Performed by: PHYSICIAN ASSISTANT

## 2018-01-29 RX ORDER — NORTRIPTYLINE HYDROCHLORIDE 10 MG/1
10 CAPSULE ORAL 2 TIMES DAILY
Status: DISCONTINUED | OUTPATIENT
Start: 2018-01-29 | End: 2018-01-31

## 2018-01-29 RX ORDER — OXYCODONE HYDROCHLORIDE 10 MG/1
10 TABLET ORAL EVERY 4 HOURS PRN
Status: DISCONTINUED | OUTPATIENT
Start: 2018-01-29 | End: 2018-01-30

## 2018-01-29 RX ORDER — POLYETHYLENE GLYCOL 3350 17 G/17G
17 POWDER, FOR SOLUTION ORAL DAILY PRN
Status: DISCONTINUED | OUTPATIENT
Start: 2018-01-29 | End: 2018-01-31

## 2018-01-29 RX ORDER — GABAPENTIN 300 MG/1
300 CAPSULE ORAL
Status: DISCONTINUED | OUTPATIENT
Start: 2018-01-29 | End: 2018-01-31

## 2018-01-29 RX ORDER — THIAMINE MONONITRATE (VIT B1) 100 MG
100 TABLET ORAL DAILY
Status: DISCONTINUED | OUTPATIENT
Start: 2018-01-29 | End: 2018-01-31

## 2018-01-29 RX ORDER — PANTOPRAZOLE SODIUM 40 MG/1
40 INJECTION, POWDER, FOR SOLUTION INTRAVENOUS
Status: DISCONTINUED | OUTPATIENT
Start: 2018-01-30 | End: 2018-01-29

## 2018-01-29 RX ORDER — PANTOPRAZOLE SODIUM 40 MG/1
40 TABLET, DELAYED RELEASE ORAL
Status: DISCONTINUED | OUTPATIENT
Start: 2018-01-29 | End: 2018-01-31

## 2018-01-29 RX ORDER — OXYCODONE HYDROCHLORIDE 5 MG/1
5 TABLET ORAL EVERY 4 HOURS PRN
Status: DISCONTINUED | OUTPATIENT
Start: 2018-01-29 | End: 2018-01-30

## 2018-01-29 RX ADMIN — HYDROMORPHONE HYDROCHLORIDE 0.5 MG: 1 INJECTION, SOLUTION INTRAMUSCULAR; INTRAVENOUS; SUBCUTANEOUS at 05:07

## 2018-01-29 RX ADMIN — Medication 3 G: at 21:53

## 2018-01-29 RX ADMIN — HYDROMORPHONE HYDROCHLORIDE 0.5 MG: 1 INJECTION, SOLUTION INTRAMUSCULAR; INTRAVENOUS; SUBCUTANEOUS at 21:54

## 2018-01-29 RX ADMIN — Medication 100 MG: at 11:00

## 2018-01-29 RX ADMIN — SODIUM CHLORIDE, SODIUM GLUCONATE, SODIUM ACETATE, POTASSIUM CHLORIDE AND MAGNESIUM CHLORIDE 100 ML/HR: 526; 502; 368; 37; 30 INJECTION, SOLUTION INTRAVENOUS at 06:06

## 2018-01-29 RX ADMIN — HEPARIN SODIUM 5000 UNITS: 5000 INJECTION, SOLUTION INTRAVENOUS; SUBCUTANEOUS at 21:53

## 2018-01-29 RX ADMIN — Medication 3 G: at 10:53

## 2018-01-29 RX ADMIN — GABAPENTIN 300 MG: 300 CAPSULE ORAL at 21:53

## 2018-01-29 RX ADMIN — OXYCODONE HYDROCHLORIDE 10 MG: 10 TABLET ORAL at 23:36

## 2018-01-29 RX ADMIN — HEPARIN SODIUM 5000 UNITS: 5000 INJECTION, SOLUTION INTRAVENOUS; SUBCUTANEOUS at 14:08

## 2018-01-29 RX ADMIN — PANTOPRAZOLE SODIUM 40 MG: 40 INJECTION, POWDER, FOR SOLUTION INTRAVENOUS at 08:46

## 2018-01-29 RX ADMIN — HYDROMORPHONE HYDROCHLORIDE 1 MG: 1 INJECTION, SOLUTION INTRAMUSCULAR; INTRAVENOUS; SUBCUTANEOUS at 03:22

## 2018-01-29 RX ADMIN — Medication 3 G: at 16:38

## 2018-01-29 RX ADMIN — OXYCODONE HYDROCHLORIDE 5 MG: 5 TABLET ORAL at 11:00

## 2018-01-29 RX ADMIN — HYDROMORPHONE HYDROCHLORIDE 0.5 MG: 1 INJECTION, SOLUTION INTRAMUSCULAR; INTRAVENOUS; SUBCUTANEOUS at 02:02

## 2018-01-29 RX ADMIN — HYDROMORPHONE HYDROCHLORIDE 0.5 MG: 1 INJECTION, SOLUTION INTRAMUSCULAR; INTRAVENOUS; SUBCUTANEOUS at 11:00

## 2018-01-29 RX ADMIN — NORTRIPTYLINE HYDROCHLORIDE 10 MG: 10 CAPSULE ORAL at 18:07

## 2018-01-29 RX ADMIN — Medication 1 TABLET: at 12:43

## 2018-01-29 RX ADMIN — HYDROMORPHONE HYDROCHLORIDE 1 MG: 1 INJECTION, SOLUTION INTRAMUSCULAR; INTRAVENOUS; SUBCUTANEOUS at 06:42

## 2018-01-29 RX ADMIN — Medication 1 TABLET: at 21:53

## 2018-01-29 RX ADMIN — HEPARIN SODIUM 5000 UNITS: 5000 INJECTION, SOLUTION INTRAVENOUS; SUBCUTANEOUS at 05:06

## 2018-01-29 RX ADMIN — HYDROMORPHONE HYDROCHLORIDE 0.5 MG: 1 INJECTION, SOLUTION INTRAMUSCULAR; INTRAVENOUS; SUBCUTANEOUS at 00:01

## 2018-01-29 RX ADMIN — NORTRIPTYLINE HYDROCHLORIDE 10 MG: 10 CAPSULE ORAL at 12:43

## 2018-01-29 RX ADMIN — OXYCODONE HYDROCHLORIDE 10 MG: 10 TABLET ORAL at 18:06

## 2018-01-29 RX ADMIN — HYDROMORPHONE HYDROCHLORIDE 0.5 MG: 1 INJECTION, SOLUTION INTRAMUSCULAR; INTRAVENOUS; SUBCUTANEOUS at 08:45

## 2018-01-29 RX ADMIN — Medication 3 G: at 03:22

## 2018-01-29 RX ADMIN — HYDROMORPHONE HYDROCHLORIDE 0.5 MG: 1 INJECTION, SOLUTION INTRAMUSCULAR; INTRAVENOUS; SUBCUTANEOUS at 18:06

## 2018-01-29 RX ADMIN — HYDROMORPHONE HYDROCHLORIDE 0.5 MG: 1 INJECTION, SOLUTION INTRAMUSCULAR; INTRAVENOUS; SUBCUTANEOUS at 14:08

## 2018-01-29 NOTE — SOCIAL WORK
CM met with pt and her father at bedside  CM reviewed role with dcp  Pt resides with her rayshawn Zavaleta and 15year old son in a single story home with 2 KAVITA  As per pt her son is staying with his father  Pt also has an adult daughter  Pt independent with ADLs and ambulation PTA  Pt does not work and able to drive PTA  Pt reports her rayshawn works full time  Rayshawn is home in the mornings and works a 2nd shift  Pt denies hx of STR or VNA  Pt denies hx of MH or drug/alcohol abuse or treatment  Pt reports pharmacy is [facility]  CM to follow  CM reviewed d/c planning process including the following: identifying help at home, patient preference for d/c planning needs, Discharge Lounge, [facility] Meds to Bed program, availability of treatment team to discuss questions or concerns patient and/or family may have regarding understanding medications and recognizing signs and symptoms once discharged  CM also encouraged patient to follow up with all recommended appointments after discharge  Patient advised of importance for patient and family to participate in managing patients medical well being

## 2018-01-29 NOTE — PROGRESS NOTES
Pt seen by surgical critical care team  VS, O2 sats, neuros stable  Pt may have full liquid diet  Pt has moderate to severe abdominal pain, requires dilaudid for pain  Will get OOB to chair today

## 2018-01-29 NOTE — PROGRESS NOTES
Progress Note - Infectious Disease   Rosaura Sinclair 36 y o  female MRN: [id number]  Unit/Bed#: ICU 10 Encounter: [id number]      Impression/Recommendations:  1   Intra-abdominal/pelvic abscesses   Patient is now status post repeat abdominal washout   She has multiple drains in place   She is clinically well and systemically stable  Operative culture from [address] [place] [address], growing only ampicillin susceptible Enterococcus   Operative culture here also growing  only ampicillin susceptible Enterococcus  Continue with IV Unasyn  Follow-up on final operative culture here      2   Possible infected  shunt   Given presence of tip of  shunt in peritoneal space, there is high probability of  shunt infection   Fortunately, patient has no symptoms concerning for meningitis   She has neurological deficits   She is status post tapping of  shunt at [facility]   CSF culture there had no growth but patient had prior antibiotic   I will go ahead and treat her for possible/presumptive  shunt infection   Patient is now status post  shunt externalization   CSF culture from 01/25 is negative       Antibiotic plan as in above  Likely treat with IV antibiotic x 4-6 weeks      3   Gastric perforation, status post repair  [facility] has persistent GE junction leak   She is now status post placement of esophageal stent  Repeat video barium swallow from yesterday with no further leakage  Monitor      4   Recurrent leukocytosis  Patient has no fever  She is clinically well  At this point, we will monitor with current antibiotic regimen for now  If WBC continues to increase, consider repeat abdomen/pelvis CT  Monitor WBC and temperature      5  Septic shock, on presentation at [facility]   This is secondary to gastric perforation   Patient is now hemodynamically stable   All cultures from [address] [place] [address] obtained and reviewed, now on chart   All blood cultures were negative   Urine culture negative   CSF culture negative   Operative culture positive for Enterococcus, as in above  Antibiotic plan as in above  Monitor hemodynamics      Discussed with the patient in detail regarding above plan      Antibiotics:  Unasyn  POD # 4     Subjective:  Patient is comfortable  Abdominal pain controlled  No headache  No chest wall pain  Temperature stays down  No chills  She is tolerating antibiotic well  No nausea, vomiting or diarrhea  Objective:  Vitals:  HR:  [] 100  Resp:  [8-28] 23  BP: (162-165)/(89-93) 165/91  SpO2:  [90 %-100 %] 95 %  Temp (24hrs), Av 7 °F (37 1 °C), Min:98 3 °F (36 8 °C), Max:99 °F (37 2 °C)  Current: Temperature: 99 °F (37 2 °C)    Physical Exam:     General: Awake, alert, cooperative, no distress  Chest:  Right chest wall VPS site clean  No drainage  No erythema/warmth  No tenderness  Lungs: Expansion symmetric, no rales, no wheezing, respirations unlabored  Heart[de-identified]  Regular rate and rhythm, S1 and S2 normal, no murmur  Abdomen: Soft, nondistended, mild incisional tenderness, bowel sounds active all four quadrants,        no masses, no organomegaly  Drain/purulent  Extremities: Trace edema  No erythema/warmth  No ulcer  Nontender to palpation  Skin:  No rash  Invasive Devices     Peripherally Inserted Central Catheter Line            PICC Line 60/22/50 Right Basilic 8 days          Drain            Closed/Suction Drain Left LLQ Bulb 19 Fr  3 days    Closed/Suction Drain Left LUQ Bulb 19 Fr  3 days    Closed/Suction Drain Right RLQ Bulb 19 Fr  3 days    Closed/Suction Drain Right RUQ Accordion 19 Fr  3 days    Ventriculostomy/Subdural Ventricular drainage catheter Right 3 days                Labs studies:   I have personally reviewed pertinent labs      Results from last 7 days  Lab Units 18  0501 18  0507 18  0425  18  2244   SODIUM mmol/L 136 141 141  < > 137   POTASSIUM mmol/L 4 1 4 4 4 1  < > 4 9   CHLORIDE mmol/L 99* 104 106  < > 102   CO2 mmol/L 31 33* 30  < > 31   ANION GAP mmol/L 6 4 5  < > 4   BUN mg/dL 22 24 28*  < > 21   CREATININE mg/dL 0 34* 0 36* 0 38*  < > 0 37*   EGFR ml/min/1 73sq m 135 132 130  < > 131   GLUCOSE RANDOM mg/dL 78 88 156*  < > 109   GLUCOSE, ISTAT   --   --   --   < >  --    CALCIUM mg/dL 9 9 10 1 9 5  < > 10 2*   AST U/L  --   --   --   --  28   ALT U/L  --   --   --   --  61   ALK PHOS U/L  --   --   --   --  177*   TOTAL PROTEIN g/dL  --   --   --   --  5 3*   BILIRUBIN TOTAL mg/dL  --   --   --   --  0 91   < > = values in this interval not displayed  Results from last 7 days  Lab Units 01/29/18  0501 01/28/18  0507 01/27/18  1141 01/27/18  0425   WBC Thousand/uL 25 53* 13 54*  --  15 14*   HEMOGLOBIN g/dL 10 1* 8 7* 8 8* 8 9*   PLATELETS Thousands/uL 753* 714*  --  642*       Results from last 7 days  Lab Units 01/25/18  1851 01/25/18  1819 01/25/18  1704   GRAM STAIN RESULT  No Polys or Bacteria seen 3+ Polys  No bacteria seen 1+ Polys  No bacteria seen   WOUND CULTURE   --  2+ Growth of Enterococcus faecalis*  --        Imaging Studies:   I have personally reviewed pertinent imaging study reports and images in PACS  EKG, Pathology, and Other Studies:   I have personally reviewed pertinent reports

## 2018-01-29 NOTE — PROGRESS NOTES
Progress Note - Critical Care   Zoila Ballard 36 y o  female MRN: [id number]  Unit/Bed#: ICU 10 Encounter: [id number]    Impression:  Principal Problem:    Gastric leak  Active Problems:    Acute peritonitis ([address] )    Idiopathic intracranial hypertension    S/P  shunt    Peritonitis (HCC)     (ventriculoperitoneal) shunt status    Murmur, cardiac      Plan:    Neuro:   · Pain controlled with: Fentanyl gtt  Fentanyl and dilaudid PRN ;consider changing to PO analgesia today  ·  shunt 2/2 pseudotumor cerebri   · s/p externalization 1/25 - draining  52 ml/day  · Purulent drainage intra-op - positive for enterococcus  · NSX following; will assess the need to replace drain next week   · Regulate sleep/wake cycle  · Trend neuro exam; GCS 15; non-focal exam today    CV:   Hemodynamically stable  Not requiring pressors at this time  · Grade 3/6 systolic murmur - ECHO EF 65% with no valvular regurg/stenosis  · MAP's > 65    Resp:   · Extubated; pulmonary toilet, incentive spirometry    GI:   · Peritonitis 2/2 GE junction leak  · S/p EGD 1/24 with stent placed  · S/p ex-lap 1/25 for abdominal abscess and fluid collections  · 4 ADENIKE drains in place - all serosanguinous; maintain per surgery recs   · RUQ - 65ml  · RLQ - 33ml    · LUQ - 65ml  · LLQ -  15ml -- dark serosanguinous drainage  · Barium swallow negative for leak; Clear liquids tolerated well yesterday, cont   Diet today  · Stress ulcer prophylaxis: consider d/c PPI today  · Bowel regimen: consider starting today    F/E/N:   · Fluid/Diuretic plan: isolyte 100ml/hr   · Nutrition/diet plan: NPO  · Replete electrolytes with goals: K >4 0, Mag >2 0, and Phos >3 0    :   · Indwelling Giles present: D/C yesterday  1025 ml UOP in 24 hrs   · Trend UOP and BUN/creat  · Strict I and O    ID:   · Leukocytosis; 25 53  · Source of infection: abdominal abscess  · Abx ordered: Unasyn for enterococcus; likely for 4-6 weeks' appreciate ID recs  · Trend temps and WBC count  · Maintain normothermia    Heme:   · Hemoglobin up to 10 1 today; trend  · HD stable   · Sub q heparin restarted on 01/27    Endo:   · Glycemic control plan: Algorithm 2    MSK/Skin:  · Frequent turning and pressure off-loading  · Local wound care as needed    Lines:  · Central venous access: R basilic PICC  · Arterial line: L radial  · Peripheral x1    VTE Prophylaxis:  · Pharmacologic Prophylaxis: Sub-q heparin  · Mechanical Prophylaxis: sequential compression device    Disposition: Continue ICU care    Consultants: General surgery, Neurosurg    Reason for Admission:     HPI/24hr events: 37 yo female who presents for sepsis  She had a gastric sleeve in 2016 and a  shunt in 2017 for IIH  She recently underwent a hiatal hernia repair on 1/11  She presented to OSH on 1/14 with peritonitis and septic shock, and was taken for emergent ex-lap and was found to have gastric perf - which was repaired  Multiple drains were placed and was started on broad spectrum abx  POD #9 showed large fluid collection in pelvis and leak around GE junction was found  Transferred here for stenting    Nothing significant overnight  Barium swallow negative for leak; tolerating clear liquid diet well  Physical Exam:    Physical Exam   Constitutional: She is oriented to person, place, and time  Vital signs are normal  She appears well-developed  She is easily aroused  No distress  HENT:   Head: Normocephalic and atraumatic  Neck: Normal range of motion  Neck supple  No JVD present  No tracheal deviation present  Cardiovascular: Normal rate  Murmur heard  Systolic murmur is present with a grade of 4/6   Pulmonary/Chest: Effort normal and breath sounds normal  No respiratory distress  Abdominal: Soft  She exhibits no distension  Bowel sounds are decreased  There is tenderness  Midline insicison with 4 ADENIKE drains in place   Abdomen soft, not distended with appropriate tenderness   Neurological: She is alert, oriented to person, place, and time and easily aroused  She has normal strength  No cranial nerve deficit or sensory deficit  GCS eye subscore is 4  GCS verbal subscore is 5  GCS motor subscore is 6  Skin: Skin is warm and dry  She is not diaphoretic  Psychiatric: She has a normal mood and affect  Her behavior is normal        Vitals:   Vitals:    18 0000 18 0001 18 0031 18 0202   BP:       BP Location:       Pulse: 90 90 90 98   Resp: 16 15 13 22   Temp: 98 5 °F (36 9 °C)      TempSrc: Oral      SpO2: 94% 94% 91% 95%   Weight:       Height:         Arterial Line BP: 168/76  Arterial Line MAP (mmHg): 106 mmHg    Temperature: Temp (24hrs), Av 5 °F (36 9 °C), Min:98 2 °F (36 8 °C), Max:98 8 °F (37 1 °C)  Current: Temperature: 98 5 °F (36 9 °C)    Weights: IBW: 54 7 kg  Body mass index is 26 6 kg/m²  Hemodynamic Monitoring:  N/A       Respiratory:  SpO2: SpO2: 95 %, SpO2 Activity: SpO2 Activity: At Rest, SpO2 Device: O2 Device: None (Room air)  O2 Flow Rate (L/min): 2 L/min (applied at this time)    Intake and Outputs:    Intake/Output Summary (Last 24 hours) at 18 0238  Last data filed at 18 0000   Gross per 24 hour   Intake          5418 33 ml   Output             1340 ml   Net          4078 33 ml           Nutrition:        Diet Orders            Start     Ordered    18 1640  Diet Surgical; Full Liquid  Diet effective now     Question Answer Comment   Diet Type Surgical    Surgical Full Liquid    RD to adjust diet per protocol?  No        18 1639            Labs:     Results from last 7 days  Lab Units 18  0507 18  1141 18  0425 18  0431   WBC Thousand/uL 13 54*  --  15 14* 17 56*   HEMOGLOBIN g/dL 8 7* 8 8* 8 9* 10 9*   HEMATOCRIT % 26 4* 26 8* 26 9* 32 3*   PLATELETS Thousands/uL 714*  --  642* 573*   MONO PCT MAN % 2*  --  2* 3*       Results from last 7 days  Lab Units 18  0507 18  0425 18  0431  18  2244   SODIUM mmol/L 141 141 136  < > 137   POTASSIUM mmol/L 4 4 4 1 4 6  < > 4 9   CHLORIDE mmol/L 104 106 103  < > 102   CO2 mmol/L 33* 30 29  < > 31   BUN mg/dL 24 28* 24  < > 21   CREATININE mg/dL 0 36* 0 38* 0 53*  < > 0 37*   CALCIUM mg/dL 10 1 9 5 9 9  < > 10 2*   TOTAL PROTEIN g/dL  --   --   --   --  5 3*   BILIRUBIN TOTAL mg/dL  --   --   --   --  0 91   ALK PHOS U/L  --   --   --   --  177*   ALT U/L  --   --   --   --  61   AST U/L  --   --   --   --  28   GLUCOSE RANDOM mg/dL 88 156* 200*  < > 109   GLUCOSE, ISTAT   --   --   --   < >  --    < > = values in this interval not displayed  Results from last 7 days  Lab Units 01/28/18  0507 01/27/18  0425 01/26/18  0431   MAGNESIUM mg/dL 1 7 2 0 2 0       Results from last 7 days  Lab Units 01/28/18  0507 01/27/18  0425 01/25/18  0427   PHOSPHORUS mg/dL 3 9 3 0 4 1        Results from last 7 days  Lab Units 01/24/18 1954   INR  1 26*   PTT seconds 28       Results from last 7 days  Lab Units 01/25/18  2118 01/24/18 1954   LACTIC ACID mmol/L 1 3 1 5           Results from last 7 days  Lab Units 01/25/18 2252 01/25/18 2125   PH ART  7 484* 7 508*   PCO2 ART mm Hg 33 6* 32 4*   PO2 ART mm Hg 165 1* 165 0*   HCO3 ART mmol/L 24 7 25 2   BASE EXC ART mmol/L 1 6 2 5   ABG SOURCE  Line, Arterial Line, Arterial               Imaging:   Xr Chest Portable    Result Date: 1/25/2018  Impression: No active disease  Workstation performed: [id number]     Xr Abdomen 1 View Kub    Result Date: 1/25/2018  Impression: Fluoroscopic guidance for placement of feeding tube with tip in the 1st portion of the duodenum  Please refer to the separate procedure notes for additional details  Workstation performed: [id number]     Ct Head Wo Contrast    Result Date: 1/25/2018  Impression: No acute intracranial abnormality  Right frontal approach catheter terminating in the region of the third ventricle  Ventricular system is decompressed    No evidence for interstitial edema or extra-axial fluid collections  Workstation performed: [id number]     Ct Chest Abdomen Pelvis W Contrast    Result Date: 1/25/2018  Impression: Extensive perihepatic subcapsular abscess which is in communication with a right paracolic gutter abscess  Drainage catheter within the left subhepatic space does not appear to communicate with this abscess  Large perisplenic abscess with mass effect on the spleen  Peripheral hypodensities within the spleen likely represent splenic infarcts  Infectious involvement less likely but not excluded  Large left paracolic gutter abscess  Mesenteric abscess as described  Pelvic abscess as described, pelvic drainage catheter is anterior to the abscess  Patient is status post placement of esophageal stent as well as gastric sleeve procedure  No evidence of extravasated oral contrast is identified on this exam  Dependent lower lobe atelectasis and small bilateral pleural effusions  Extensive subcutaneous edema suggesting anasarca  Soft tissue gas identified within the left axillary soft tissues, correlate for a potential iatrogenic etiology  If this does not exist, an infectious process related to a gas-forming organism is should be excluded  I personally discussed this study with Rina Lawson on 1/25/2018 11:58 AM  Workstation performed: [id number]     EC Shunt Series    Result Date: 1/25/2018  Impression: Intact  shunt catheter  Workstation performed: [id number]     1/28/2018: Barium swallow: IMPRESSION:     1   Gastroesophageal stent identified without evidence for leak  Slow passage of contrast material through the stent  There is some persistent stasis of contrast material within the stent at the end of the exam   2   Reflux of contrast is noted to the upper esophagus        Micro:   Blood Culture: No results found for: BLOODCX  Urine Culture: No results found for: URINECX  Sputum Culture: No components found for: SPUTUMCX  Wound Culure:   Lab Results   Component Value Date WOUNDCULT 2+ Growth of Enterococcus faecalis (A) 01/25/2018       Results from last 7 days  Lab Units 01/25/18  1851 01/25/18  1819 01/25/18  1704   GRAM STAIN RESULT  No Polys or Bacteria seen 3+ Polys  No bacteria seen 1+ Polys  No bacteria seen   WOUND CULTURE   --  2+ Growth of Enterococcus faecalis*  --        Allergies: Allergies   Allergen Reactions    Benadryl [Diphenhydramine] Swelling    Phenergan [Promethazine] Hives       Medications:   Scheduled Meds:    ampicillin-sulbactam 3 g Intravenous Q6H   heparin (porcine) 5,000 Units Subcutaneous Q8H [address]   insulin lispro 1-5 Units Subcutaneous Q6H MYRNA   pantoprazole 40 mg Intravenous Q12H [address]   senna-docusate sodium 1 tablet Oral HS     Continuous Infusions:    multi-electrolyte 100 mL/hr Last Rate: 100 mL/hr (01/28/18 1856)     PRN Meds:    HYDROmorphone 0 5 mg Q2H PRN   HYDROmorphone 1 mg Q2H PRN       Invasive lines and devices:   Invasive Devices     Peripherally Inserted Central Catheter Line            PICC Line 96/28/13 Right Basilic 8 days          Arterial Line            Arterial Line 01/25/18 Left Radial 3 days          Drain            Closed/Suction Drain Left LLQ Bulb 19 Fr  3 days    Closed/Suction Drain Left LUQ Bulb 19 Fr  3 days    Closed/Suction Drain Right RLQ Bulb 19 Fr  3 days    Closed/Suction Drain Right RUQ Accordion 19 Fr  3 days    Ventriculostomy/Subdural Ventricular drainage catheter Right 3 days                Code Status: Level 1 - Full Code        SIGNATURE: Roge Campos MD  DATE: January 29, 2018  TIME: 2:38 AM

## 2018-01-29 NOTE — ASSESSMENT & PLAN NOTE
S/p esophageal stent placement  UGI yesterday negative for leak  - OK to continue bariatric full liquids  Add ensure clears v protein shakes - family encouraged to bring her preferred shake from home due to her dietary restrictions  - I discussed with her that she would remain in bariatric full liquids in the short term period, at least while stent was in place

## 2018-01-29 NOTE — PROGRESS NOTES
Progress Note Tyler Vasquez 1975, 36 y o  female MRN: [id number]    Unit/Bed#: ICU 10 Encounter: [id number]    Primary Care Provider: Trevon Sanchez DO   Date and time admitted to hospital: 2018  6:11 PM        S/P  shunt   Assessment & Plan    S/p externalization of shunt  Management per Neurosurgery        Acute peritonitis   Assessment & Plan    S/p ex-lap, washout and drain placement  Peritonitis resolved  WBC acutely increased today to 25 5 from 13 5 although she remains afebrile with normal hemodynamics  ADENIKE drains x4 with SS drainage  - continue Unasyn per Id; appreciate assistance  - Trend WBC  Will plan for CT scan tomorrow (POD 5) if WBC continues to increase, sooner if she clinically decompensates  - Maintain ADENIKE drains to bulb suction        * Gastric leak   Assessment & Plan    S/p esophageal stent placement  UGI yesterday negative for leak  - OK to continue bariatric full liquids  Add ensure clears v protein shakes - family encouraged to bring her preferred shake from home due to her dietary restrictions  - I discussed with her that she would remain in bariatric full liquids in the short term period, at least while stent was in place  Subjective/Objective     Subjective:   No acute events  Tolerating bariatric fulls  Denies subjective fevers/chills and denies nausea/emesis  Passing flatus  Objective:     Blood pressure 162/89, pulse 96, temperature 98 7 °F (37 1 °C), temperature source Oral, resp  rate 21, height 5' 4" (1 626 m), weight 70 6 kg (155 lb 10 3 oz), SpO2 95 %, not currently breastfeeding  ,Body mass index is 26 72 kg/m²      Temp (24hrs), Av 6 °F (37 °C), Min:98 3 °F (36 8 °C), Max:98 8 °F (37 1 °C)  Current: Temperature: 98 7 °F (37 1 °C)      Intake/Output Summary (Last 24 hours) at 18 1124  Last data filed at 18 1053   Gross per 24 hour   Intake          5558 33 ml   Output             1170 ml   Net          4388 33 ml       Invasive Devices     Peripherally Inserted Central Catheter Line            PICC Line 49/00/56 Right Basilic 8 days          Drain            Closed/Suction Drain Left LLQ Bulb 19 Fr  3 days    Closed/Suction Drain Left LUQ Bulb 19 Fr  3 days    Closed/Suction Drain Right RLQ Bulb 19 Fr  3 days    Closed/Suction Drain Right RUQ Accordion 19 Fr  3 days    Ventriculostomy/Subdural Ventricular drainage catheter Right 3 days                Physical Exam:    GENERAL APPEARANCE: Patient in no acute distress  Aox4  Externalized  shunt in place  HEENT: NCAT; PERRL, EOMs intact; Mucous membranes moist    CV: Regular rate and rhythm; + S1, S2; no murmur/gallops/rubs appreciated  LUNGS: Clear to auscultation; no wheezes/rales/rhonci  ABD: NABS; soft; non-distended; non-tender  Midline incision c/d/i  ADENIKE drains x4 with SS drainage  EXT: +2 pulses bilaterally upper & lower extremities; no clubbing/cyanosis/edema  NEURO: GCS 15; no focal neurologic deficits; neurovascularly intact  SKIN: Warm, dry and well perfused; no rash; no jaundice  Lab, Imaging and other studies:  I have personally reviewed pertinent lab results    , CBC:   Lab Results   Component Value Date    WBC 25 53 (H) 01/29/2018    HGB 10 1 (L) 01/29/2018    HCT 30 9 (L) 01/29/2018    MCV 91 01/29/2018     (H) 01/29/2018    MCH 29 6 01/29/2018    MCHC 32 7 01/29/2018    RDW 14 5 01/29/2018    MPV 9 1 01/29/2018    NRBC 0 01/29/2018   , CMP:   Lab Results   Component Value Date     01/29/2018    K 4 1 01/29/2018    CL 99 (L) 01/29/2018    CO2 31 01/29/2018    ANIONGAP 6 01/29/2018    BUN 22 01/29/2018    CREATININE 0 34 (L) 01/29/2018    GLUCOSE 78 01/29/2018    CALCIUM 9 9 01/29/2018    EGFR 135 01/29/2018     VTE Pharmacologic Prophylaxis: Heparin  VTE Mechanical Prophylaxis: sequential compression device    Caswell Skiff, MD  1/29/2018

## 2018-01-29 NOTE — PROGRESS NOTES
Progress Note - Neurosurgery   Bristol Arrow 36 y o  female MRN: [id number]  Unit/Bed#: ICU 10 Encounter: [id number]    Assessment:  1  Procedure day for externalization of right-sided ventriculoperitoneal shunt  2  History of ventriculoperitoneal shunt for idiopathic intracranial hypertension (originally placed May 30, 2017)  3  Left upper extremity occlusive thrombophlebitis cephalic vein  4  Peritonitis   5  Gastric perforation status post repair    Plan:  26-year-old female transfer [facility] for esophageal stent status post upper gastric injury during laparoscopic paraesophageal hernia repair  Patient developed sepsis and required multiple operative procedures  Upon transfer to [facility], she underwent a esophageal stent and externalization ventriculoperitoneal shunt  · Exam reveals diffuse weakness  AAO  Following commands  Dressing intact  · Imaging reviewed personally and by attending  Final results as below  · Shunt series:  Intact catheter with Codman Hakim shunt set to 70mm H2O  · Maintain externalized shunt at 8 mmHg  52 mL over 24 hours  Tentative plan to trial shunt dependence this week  · Pain control per primary team  · Mobilize daily  · SSC following - bariatric full with with diet  ADENIKE drains x4  Trend white count 25 53 (13 54)  Possible CT scan tomorrow with worsening leukocytosis or sooner with any decline  · Infectious disease following - continue on IV Unasyn anticipated 4-6 weeks  · DVT PPX:  Heparin SCDs on during exam   · Will continue to follow    Subjective/Objective   Chief Complaint: "I'm tired"/Follow-up after externalization VPS    Subjective: Patient c/o abd pain along with posterior headache which controlled  Notes diffuse weakness  No Cp/SOB  Tolerating liquids  Denies vision or speech changes  Objective: Laying in bed  NAD  I/O       01/27 0701 - 01/28 0700 01/28 0701 - 01/29 0700 01/29 0701 - 01/30 0700    P  O   3040     I V  (mL/kg) 2475 4 (35 2) 2416 7 (34 2) 486 7 (6 9)    NG/GT       IV Piggyback 400 400 100    TPN       Feedings       Total Intake(mL/kg) 2875 4 (40 9) 5856 7 (83) 586 7 (8 3)    Urine (mL/kg/hr) 970 (0 6) 1025 (0 6) 525 (1 1)    Drains 239 (0 1) 230 (0 1) 35 (0 1)    Total Output 1209 1255 560    Net +1666 4 +4601 7 +26 7           Unmeasured Urine Occurrence  1 x           Invasive Devices     Peripherally Inserted Central Catheter Line            PICC Line 72/21/18 Right Basilic 8 days          Drain            Closed/Suction Drain Left LLQ Bulb 19 Fr  3 days    Closed/Suction Drain Left LUQ Bulb 19 Fr  3 days    Closed/Suction Drain Right RLQ Bulb 19 Fr  3 days    Closed/Suction Drain Right RUQ Accordion 19 Fr  3 days    Ventriculostomy/Subdural Ventricular drainage catheter Right 3 days                Physical Exam:  Vitals: Blood pressure 163/93, pulse 94, temperature 99 °F (37 2 °C), resp  rate 13, height 5' 4" (1 626 m), weight 70 6 kg (155 lb 10 3 oz), SpO2 94 %, not currently breastfeeding  ,Body mass index is 26 72 kg/m²      Hemodynamic Monitoring: MAP: Arterial Line MAP (mmHg): 112 mmHg,     General appearance: alert, appears stated age, cooperative and no distress  Head: Normocephalic, without obvious abnormality, atraumatic  Eyes: EOMI, PERRL  Neck: supple, symmetrical, trachea midline   Chest: dressing intact  Lungs: non labored breathing  Heart: regular heart rate  Neurologic:   Mental status: Alert, oriented, thought content appropriate, affect approipriate   Cranial nerves: grossly intact (Cranial nerves II-XII)  Sensory: normal to LT  Motor: moving all extremities diffuse weakness, proximal>distal  Coordination: finger to nose normal bilaterally, no drift bilaterally    Lab Results:    Results from last 7 days  Lab Units 01/29/18  0501 01/28/18  0507 01/27/18  1141 01/27/18  0425   WBC Thousand/uL 25 53* 13 54*  --  15 14*   HEMOGLOBIN g/dL 10 1* 8 7* 8 8* 8 9*   HEMATOCRIT % 30 9* 26 4* 26 8* 26 9*   PLATELETS Thousands/uL 753* 714*  --  642*   MONO PCT MAN % 4 2*  --  2*       Results from last 7 days  Lab Units 01/29/18  0501 01/28/18  0507 01/27/18  0425  01/24/18  2244   SODIUM mmol/L 136 141 141  < > 137   POTASSIUM mmol/L 4 1 4 4 4 1  < > 4 9   CHLORIDE mmol/L 99* 104 106  < > 102   CO2 mmol/L 31 33* 30  < > 31   BUN mg/dL 22 24 28*  < > 21   CREATININE mg/dL 0 34* 0 36* 0 38*  < > 0 37*   CALCIUM mg/dL 9 9 10 1 9 5  < > 10 2*   TOTAL PROTEIN g/dL  --   --   --   --  5 3*   BILIRUBIN TOTAL mg/dL  --   --   --   --  0 91   ALK PHOS U/L  --   --   --   --  177*   ALT U/L  --   --   --   --  61   AST U/L  --   --   --   --  28   GLUCOSE RANDOM mg/dL 78 88 156*  < > 109   GLUCOSE, ISTAT   --   --   --   < >  --    < > = values in this interval not displayed  Results from last 7 days  Lab Units 01/28/18  0507 01/27/18  0425 01/26/18  0431   MAGNESIUM mg/dL 1 7 2 0 2 0       Results from last 7 days  Lab Units 01/28/18  0507 01/27/18  0425 01/25/18  0427   PHOSPHORUS mg/dL 3 9 3 0 4 1       Results from last 7 days  Lab Units 01/24/18  1954   INR  1 26*   PTT seconds 28     No results found for: TROPONINT  ABG:  Lab Results   Component Value Date    PHART 7 484 (H) 01/25/2018    NEC3XEH 33 6 (L) 01/25/2018    PO2ART 165 1 (H) 01/25/2018    ZUG1FSI 24 7 01/25/2018    BEART 1 6 01/25/2018    SOURCE Line, Arterial 01/25/2018       Imaging Studies: I have personally reviewed pertinent reports  and I have personally reviewed pertinent films in PACS    EKG, Pathology, and Other Studies: I have personally reviewed pertinent reports        VTE Pharmacologic Prophylaxis: Heparin    VTE Mechanical Prophylaxis: sequential compression device

## 2018-01-29 NOTE — PROGRESS NOTES
Spoke to family and patient at bedside  Questions answered and concerns addressed       Diana Wade PA-C

## 2018-01-29 NOTE — PROGRESS NOTES
01/29/18 1400   Spiritual Beliefs/Perceptions   Concept of God Accepting   Stress Factors   Patient Stress Factors Health changes   Coping Responses   Patient Coping Fearful   Plan of Care   Comments PT believes in God but did not want prayer but cultivated a relationship of care and support      Assessment Completed by: Unit visit

## 2018-01-29 NOTE — ASSESSMENT & PLAN NOTE
S/p ex-lap, washout and drain placement  Peritonitis resolved  WBC acutely increased today to 25 5 from 13 5 although she remains afebrile with normal hemodynamics  ADENIKE drains x4 with SS drainage  - continue Unasyn per Id; appreciate assistance  - Trend WBC   Will plan for CT scan tomorrow (POD 5) if WBC continues to increase, sooner if she clinically decompensates  - Maintain ADENIKE drains to bulb suction

## 2018-01-30 ENCOUNTER — APPOINTMENT (INPATIENT)
Dept: RADIOLOGY | Facility: HOSPITAL | Age: 43
DRG: 711 | End: 2018-01-30
Payer: COMMERCIAL

## 2018-01-30 LAB
ANION GAP SERPL CALCULATED.3IONS-SCNC: 8 MMOL/L (ref 4–13)
APPEARANCE CSF: CLEAR
BASOPHILS # BLD MANUAL: 0 THOUSAND/UL (ref 0–0.1)
BASOPHILS NFR MAR MANUAL: 0 % (ref 0–1)
BUN SERPL-MCNC: 21 MG/DL (ref 5–25)
CA-I BLD-SCNC: 1.41 MMOL/L (ref 1.12–1.32)
CALCIUM SERPL-MCNC: 10.3 MG/DL (ref 8.3–10.1)
CHLORIDE SERPL-SCNC: 95 MMOL/L (ref 100–108)
CO2 SERPL-SCNC: 30 MMOL/L (ref 21–32)
CREAT SERPL-MCNC: 0.37 MG/DL (ref 0.6–1.3)
EOSINOPHIL # BLD MANUAL: 0 THOUSAND/UL (ref 0–0.4)
EOSINOPHIL NFR BLD MANUAL: 0 % (ref 0–6)
ERYTHROCYTE [DISTWIDTH] IN BLOOD BY AUTOMATED COUNT: 14.8 % (ref 11.6–15.1)
GFR SERPL CREATININE-BSD FRML MDRD: 131 ML/MIN/1.73SQ M
GLUCOSE CSF-MCNC: 56 MG/DL (ref 50–80)
GLUCOSE SERPL-MCNC: 70 MG/DL (ref 65–140)
GLUCOSE SERPL-MCNC: 73 MG/DL (ref 65–140)
GLUCOSE SERPL-MCNC: 77 MG/DL (ref 65–140)
GLUCOSE SERPL-MCNC: 85 MG/DL (ref 65–140)
GRAM STN SPEC: NORMAL
HCT VFR BLD AUTO: 29.3 % (ref 34.8–46.1)
HGB BLD-MCNC: 9.9 G/DL (ref 11.5–15.4)
LYMPHOCYTES # BLD AUTO: 0.65 THOUSAND/UL (ref 0.6–4.47)
LYMPHOCYTES # BLD AUTO: 2 % (ref 14–44)
MAGNESIUM SERPL-MCNC: 1.8 MG/DL (ref 1.6–2.6)
MCH RBC QN AUTO: 30.2 PG (ref 26.8–34.3)
MCHC RBC AUTO-ENTMCNC: 33.8 G/DL (ref 31.4–37.4)
MCV RBC AUTO: 89 FL (ref 82–98)
MONOCYTES # BLD AUTO: 0.98 THOUSAND/UL (ref 0–1.22)
MONOCYTES NFR BLD: 3 % (ref 4–12)
NEUTROPHILS # BLD MANUAL: 31.07 THOUSAND/UL (ref 1.85–7.62)
NEUTS SEG NFR BLD AUTO: 95 % (ref 43–75)
NRBC BLD AUTO-RTO: 0 /100 WBCS
NRBC BLD AUTO-RTO: 1 /100 WBC (ref 0–2)
PHOSPHATE SERPL-MCNC: 3.7 MG/DL (ref 2.7–4.5)
PLATELET # BLD AUTO: 795 THOUSANDS/UL (ref 149–390)
PLATELET BLD QL SMEAR: ABNORMAL
PMV BLD AUTO: 9.3 FL (ref 8.9–12.7)
POLYCHROMASIA BLD QL SMEAR: PRESENT
POTASSIUM SERPL-SCNC: 3.9 MMOL/L (ref 3.5–5.3)
PROT CSF-MCNC: 25 MG/DL (ref 15–45)
RBC # BLD AUTO: 3.28 MILLION/UL (ref 3.81–5.12)
RBC # CSF MANUAL: 0 UL (ref 0–10)
RBC MORPH BLD: PRESENT
SODIUM SERPL-SCNC: 133 MMOL/L (ref 136–145)
TOTAL CELLS COUNTED BLD: NO
WBC # BLD AUTO: 32.71 THOUSAND/UL (ref 4.31–10.16)
WBC # CSF AUTO: 0 /UL (ref 0–5)

## 2018-01-30 PROCEDURE — 82330 ASSAY OF CALCIUM: CPT | Performed by: PHYSICIAN ASSISTANT

## 2018-01-30 PROCEDURE — 89050 BODY FLUID CELL COUNT: CPT | Performed by: PHYSICIAN ASSISTANT

## 2018-01-30 PROCEDURE — 99024 POSTOP FOLLOW-UP VISIT: CPT | Performed by: SURGERY

## 2018-01-30 PROCEDURE — 85027 COMPLETE CBC AUTOMATED: CPT | Performed by: PHYSICIAN ASSISTANT

## 2018-01-30 PROCEDURE — 82948 REAGENT STRIP/BLOOD GLUCOSE: CPT

## 2018-01-30 PROCEDURE — 80048 BASIC METABOLIC PNL TOTAL CA: CPT | Performed by: PHYSICIAN ASSISTANT

## 2018-01-30 PROCEDURE — 87070 CULTURE OTHR SPECIMN AEROBIC: CPT | Performed by: PHYSICIAN ASSISTANT

## 2018-01-30 PROCEDURE — 99254 IP/OBS CNSLTJ NEW/EST MOD 60: CPT | Performed by: INTERNAL MEDICINE

## 2018-01-30 PROCEDURE — 84100 ASSAY OF PHOSPHORUS: CPT | Performed by: PHYSICIAN ASSISTANT

## 2018-01-30 PROCEDURE — 99253 IP/OBS CNSLTJ NEW/EST LOW 45: CPT | Performed by: SURGERY

## 2018-01-30 PROCEDURE — 74177 CT ABD & PELVIS W/CONTRAST: CPT

## 2018-01-30 PROCEDURE — 83735 ASSAY OF MAGNESIUM: CPT | Performed by: PHYSICIAN ASSISTANT

## 2018-01-30 PROCEDURE — 99024 POSTOP FOLLOW-UP VISIT: CPT | Performed by: PHYSICIAN ASSISTANT

## 2018-01-30 PROCEDURE — 99233 SBSQ HOSP IP/OBS HIGH 50: CPT | Performed by: EMERGENCY MEDICINE

## 2018-01-30 PROCEDURE — 89051 BODY FLUID CELL COUNT: CPT | Performed by: PHYSICIAN ASSISTANT

## 2018-01-30 PROCEDURE — 71260 CT THORAX DX C+: CPT

## 2018-01-30 PROCEDURE — 99233 SBSQ HOSP IP/OBS HIGH 50: CPT | Performed by: INTERNAL MEDICINE

## 2018-01-30 PROCEDURE — 85007 BL SMEAR W/DIFF WBC COUNT: CPT | Performed by: PHYSICIAN ASSISTANT

## 2018-01-30 PROCEDURE — 82945 GLUCOSE OTHER FLUID: CPT | Performed by: PHYSICIAN ASSISTANT

## 2018-01-30 PROCEDURE — 84157 ASSAY OF PROTEIN OTHER: CPT | Performed by: PHYSICIAN ASSISTANT

## 2018-01-30 RX ORDER — OXYCODONE HYDROCHLORIDE 10 MG/1
10 TABLET ORAL EVERY 4 HOURS PRN
Status: DISCONTINUED | OUTPATIENT
Start: 2018-01-30 | End: 2018-01-30

## 2018-01-30 RX ORDER — MAGNESIUM SULFATE HEPTAHYDRATE 40 MG/ML
2 INJECTION, SOLUTION INTRAVENOUS ONCE
Status: COMPLETED | OUTPATIENT
Start: 2018-01-30 | End: 2018-02-07

## 2018-01-30 RX ORDER — SODIUM CHLORIDE 9 MG/ML
125 INJECTION, SOLUTION INTRAVENOUS CONTINUOUS
Status: DISCONTINUED | OUTPATIENT
Start: 2018-01-30 | End: 2018-02-01

## 2018-01-30 RX ORDER — FLUCONAZOLE 2 MG/ML
200 INJECTION, SOLUTION INTRAVENOUS EVERY 24 HOURS
Status: DISCONTINUED | OUTPATIENT
Start: 2018-01-30 | End: 2018-01-30

## 2018-01-30 RX ORDER — OXYCODONE HCL 5 MG/5 ML
10 SOLUTION, ORAL ORAL EVERY 4 HOURS PRN
Status: DISCONTINUED | OUTPATIENT
Start: 2018-01-30 | End: 2018-01-31

## 2018-01-30 RX ORDER — FLUCONAZOLE 2 MG/ML
400 INJECTION, SOLUTION INTRAVENOUS EVERY 24 HOURS
Status: COMPLETED | OUTPATIENT
Start: 2018-01-30 | End: 2018-03-03

## 2018-01-30 RX ORDER — OXYCODONE HCL 5 MG/5 ML
15 SOLUTION, ORAL ORAL EVERY 4 HOURS PRN
Status: DISCONTINUED | OUTPATIENT
Start: 2018-01-30 | End: 2018-01-31

## 2018-01-30 RX ADMIN — HEPARIN SODIUM 5000 UNITS: 5000 INJECTION, SOLUTION INTRAVENOUS; SUBCUTANEOUS at 05:15

## 2018-01-30 RX ADMIN — HYDROMORPHONE HYDROCHLORIDE 0.5 MG: 1 INJECTION, SOLUTION INTRAMUSCULAR; INTRAVENOUS; SUBCUTANEOUS at 18:38

## 2018-01-30 RX ADMIN — HYDROMORPHONE HYDROCHLORIDE 0.5 MG: 1 INJECTION, SOLUTION INTRAMUSCULAR; INTRAVENOUS; SUBCUTANEOUS at 10:59

## 2018-01-30 RX ADMIN — HYDROMORPHONE HYDROCHLORIDE 0.5 MG: 1 INJECTION, SOLUTION INTRAMUSCULAR; INTRAVENOUS; SUBCUTANEOUS at 05:14

## 2018-01-30 RX ADMIN — Medication 1 TABLET: at 22:04

## 2018-01-30 RX ADMIN — HYDROMORPHONE HYDROCHLORIDE 1 MG: 1 INJECTION, SOLUTION INTRAMUSCULAR; INTRAVENOUS; SUBCUTANEOUS at 22:00

## 2018-01-30 RX ADMIN — Medication 100 MG: at 08:58

## 2018-01-30 RX ADMIN — NORTRIPTYLINE HYDROCHLORIDE 10 MG: 10 CAPSULE ORAL at 08:58

## 2018-01-30 RX ADMIN — CALCIUM GLUCONATE: 94 INJECTION, SOLUTION INTRAVENOUS at 21:20

## 2018-01-30 RX ADMIN — SODIUM CHLORIDE 125 ML/HR: 0.9 INJECTION, SOLUTION INTRAVENOUS at 14:47

## 2018-01-30 RX ADMIN — OXYCODONE HYDROCHLORIDE 10 MG: 10 TABLET ORAL at 04:18

## 2018-01-30 RX ADMIN — OXYCODONE HYDROCHLORIDE 15 MG: 5 SOLUTION ORAL at 13:11

## 2018-01-30 RX ADMIN — GABAPENTIN 300 MG: 300 CAPSULE ORAL at 22:04

## 2018-01-30 RX ADMIN — Medication 3 G: at 09:12

## 2018-01-30 RX ADMIN — Medication 3 G: at 21:23

## 2018-01-30 RX ADMIN — MAGNESIUM SULFATE HEPTAHYDRATE 2 G: 40 INJECTION, SOLUTION INTRAVENOUS at 13:03

## 2018-01-30 RX ADMIN — HEPARIN SODIUM 5000 UNITS: 5000 INJECTION, SOLUTION INTRAVENOUS; SUBCUTANEOUS at 13:12

## 2018-01-30 RX ADMIN — Medication 1 TABLET: at 08:58

## 2018-01-30 RX ADMIN — OXYCODONE HYDROCHLORIDE 10 MG: 10 TABLET ORAL at 08:58

## 2018-01-30 RX ADMIN — Medication 3 G: at 04:18

## 2018-01-30 RX ADMIN — HEPARIN SODIUM 5000 UNITS: 5000 INJECTION, SOLUTION INTRAVENOUS; SUBCUTANEOUS at 21:25

## 2018-01-30 RX ADMIN — PANTOPRAZOLE SODIUM 40 MG: 40 TABLET, DELAYED RELEASE ORAL at 05:15

## 2018-01-30 RX ADMIN — Medication 3 G: at 16:54

## 2018-01-30 RX ADMIN — IOHEXOL 100 ML: 350 INJECTION, SOLUTION INTRAVENOUS at 12:15

## 2018-01-30 RX ADMIN — FLUCONAZOLE 400 MG: 2 INJECTION INTRAVENOUS at 17:33

## 2018-01-30 NOTE — MEDICAL STUDENT
Progress Note - Critical Care   Isidro García 36 y o  female MRN: [id number]  Unit/Bed#: ICU 10 Encounter: [id number]    HPI/24hr events: Pt's WBC continues to trend upward, although she remains clinically stable and afebrile  Will obtain CT of abd/pelvis today  Neuro:   1  S/p  shunt externalization   -per neurosurgery maintain external ventricular drain at 8 mm mercury, may consider  trial of shunt dependence later this week  2  Analgesia/sedation:   -prn: dilaudid   5 mg IV q 3H, oxycodone 10 mg Q4H : consider weaning IV pain control   -scheduled: home gabapentin 300 mg PO hs and notriptyline 10 charley PO BID  3  Continue trending neuro exam  4  Regulate sleep/wake cycle                  CV: hemodynamically stable, not requiring pressors   1  3 systolic murmur: ECHO EF 65 % with no valvular regurg/stenosis   2  Maintain MAP > 65   2  Access:   -PICC line Right Basilic for 9 days                  Lun  Pulmonary toilet and incentive spirometry                   GI:  2  Acute peritonitis:  -S/p EGD  with stent placement  -s/p ex-lap  for abdominal abscesses and fluid collections : 4 ADENIKE tubes put in place; Tammy Sensor NG tube was put in place   -: Barium swallow negative for leak --> advanced to bariatric full liquid diet on , will remain on this diet in the short term period at least while GE stent remains in place  -stress ulcer prophylaxis: IV protonix 40 mg IV q12 h   -Bowel regimen: senna-docusate sodium 1 tablet PO HS, first dose given last night; miralax 17 g PRN                FEN:  1  Electrolytes: Replete electrolytes as needed (goal: K>4, Mag > 2 and Phos > 3) : Na of 133 and Cl of 95, consider repletion with NS   -flintstones multivitamin, vitamin B1 100 mg daily   2  Nutrition: Full Bariatric liquids; add ensure or protein shakes, family was encouraged to bring preferred shakes from home    Pt will remain on bariatric full liquid diet in the short term, at least while stent is still in place                :   -Urine output: 995 yesterday --> continue monitoring urine output   -Bun: 21, Cr:  37 --> continue trending BUN and Cr  -strict I and O                ID: WBC: 32 71 from  53 yesterday, remains afebrile   1  Acute peritonitis :  -because WBC continues to trend upward, will obtain a CT abd/pelvis today   -continue current abx regimen per ID: unasyn 3 g 6 h   -f/u operative cx   -trend WBCs, temps and hemodynamics   2  Possible infected  shunt  -CSF cx from  negative   -will likely tx with IV abx x 4-6 weeks                  Heme:   -Hgb 9 9 from 10 1: consider trending   -subQ heparin restarted on                 Endo:  -glycemic control : insulin lispro 100 units/mL q6H                            Msk/Skin:  -frequent turning and pressure off-loading  -local wound care as needed                  Disposition: continue ICU care      Consultants:General surgery, neurosurgery, ID      VTE PPx:        Pharmacologic: Sub-q heparin       Mechanical: sequential compression device       Vitals:    18 0300 18 0400 18 0500 18 0600   BP: 146/75 151/84 155/80 111/57   Pulse: 104 (!) 106 104 104   Resp: 18 18 (!) 23 15   Temp:  99 1 °F (37 3 °C)     TempSrc:  Oral     SpO2: 92% 92% 93% 93%   Weight:       Height:         Arterial Line BP: 166/82  Arterial Line MAP (mmHg): 112 mmHg    Temperature:   Temp (24hrs), Av 5 °F (36 9 °C), Min:97 4 °F (36 3 °C), Max:99 1 °F (37 3 °C)    Current: Temperature: 99 1 °F (37 3 °C)    Weights:   IBW: 54 7 kg    Body mass index is 26 72 kg/m²    Weight (last 2 days)     Date/Time   Weight    18 0559  70 6 (155 65)    18 0600  70 3 (154 98)              Hemodynamic Monitoring:  Non-Invasive/Invasive Ventilation Settings:  Respiratory    Lab Data (Last 4 hours)    None         O2/Vent Data (Last 4 hours)    None              No results found for: PHART, URQ0MUA, PO2ART, QJA3NQB, T6NMTOGU, BEART, SOURCE      Intake and Outputs:  I/O       01/23 0701 - 01/24 0700 01/24 0701 - 01/25 0700    I V  (mL/kg)  1135 (16)    IV Piggyback  100    Total Intake(mL/kg)  1235 (17 4)    Urine (mL/kg/hr)  900    Drains  760    Total Output   1660    Net   -425                Nutrition:        Diet Orders            Start     Ordered    01/29/18 1028  Diet Bariatric; Bariatric Full Liquid; Lactose Restricted  Diet effective now     Question Answer Comment   Diet Type Bariatric    Bariatric Bariatric Full Liquid    Other Restriction(s): Lactose Restricted    RD to adjust diet per protocol? No        01/29/18 1029            Labs:     Results from last 7 days  Lab Units 01/30/18 0441 01/29/18  0501 01/28/18  0507   WBC Thousand/uL 32 71* 25 53* 13 54*   HEMOGLOBIN g/dL 9 9* 10 1* 8 7*   HEMATOCRIT % 29 3* 30 9* 26 4*   PLATELETS Thousands/uL 795* 753* 714*   MONO PCT MAN % 3* 4 2*        Results from last 7 days  Lab Units 01/30/18 0441 01/29/18  0501 01/28/18  0507  01/24/18  2244   SODIUM mmol/L 133* 136 141  < > 137   POTASSIUM mmol/L 3 9 4 1 4 4  < > 4 9   CHLORIDE mmol/L 95* 99* 104  < > 102   CO2 mmol/L 30 31 33*  < > 31   BUN mg/dL 21 22 24  < > 21   CREATININE mg/dL 0 37* 0 34* 0 36*  < > 0 37*   CALCIUM mg/dL 10 3* 9 9 10 1  < > 10 2*   TOTAL PROTEIN g/dL  --   --   --   --  5 3*   BILIRUBIN TOTAL mg/dL  --   --   --   --  0 91   ALK PHOS U/L  --   --   --   --  177*   ALT U/L  --   --   --   --  61   AST U/L  --   --   --   --  28   GLUCOSE RANDOM mg/dL 73 78 88  < > 109   GLUCOSE, ISTAT   --   --   --   < >  --    < > = values in this interval not displayed      Results from last 7 days  Lab Units 01/30/18 0441 01/28/18  0507 01/27/18  0425   MAGNESIUM mg/dL 1 8 1 7 2 0       Results from last 7 days  Lab Units 01/30/18 0441 01/28/18  0507 01/27/18  0425   PHOSPHORUS mg/dL 3 7 3 9 3 0        Results from last 7 days  Lab Units 01/24/18  1954   INR  1 26*   PTT seconds 28       Results from last 7 days  Lab Units 01/25/18  2118   LACTIC ACID mmol/L 1 3     No results found for: TROPONINI      Micro:  Lab Results   Component Value Date    WOUNDCULT 2+ Growth of Enterococcus faecalis (A) 01/25/2018       Allergies: Allergies   Allergen Reactions    Benadryl [Diphenhydramine] Swelling    Phenergan [Promethazine] Hives       Medications:   Scheduled Meds:    Current Facility-Administered Medications:  ampicillin-sulbactam 3 g Intravenous Q6H Lena Gallagher MD Last Rate: Stopped (01/30/18 0448)   gabapentin 300 mg Oral HS Yachats Spark, PA-C    heparin (porcine) 5,000 Units Subcutaneous [facility] ABNER Dougherty    HYDROmorphone 0 5 mg Intravenous Q3H PRN Yachats Spark, PA-C    insulin lispro 1-5 Units Subcutaneous Q6H [address] Jesus Aragon MD    multivitamin 1 tablet Oral Daily Ezio Spark, PA-C    nortriptyline 10 mg Oral BID Yachats Spark, PA-C    oxyCODONE 5 mg Oral Q4H PRN Yachats Spark, PA-C    Or        oxyCODONE 10 mg Oral Q4H PRN Ezio Spark, PA-C    pantoprazole 40 mg Oral Early Morning Yachats Spark, PA-C    polyethylene glycol 17 g Oral Daily PRN Ezio Spark, PA-C    senna-docusate sodium 1 tablet Oral HS ABNER Tse    thiamine 100 mg Oral Daily Ezio Spark, PA-C      Continuous Infusions:     PRN Meds:    HYDROmorphone 0 5 mg Q3H PRN   oxyCODONE 5 mg Q4H PRN   Or     oxyCODONE 10 mg Q4H PRN   polyethylene glycol 17 g Daily PRN       Invasive lines and devices:   Invasive Devices     Peripherally Inserted Central Catheter Line            PICC Line 96/73/97 Right Basilic 9 days          Drain            Closed/Suction Drain Left LLQ Bulb 19 Fr  4 days    Closed/Suction Drain Left LUQ Bulb 19 Fr  4 days    Closed/Suction Drain Right RLQ Bulb 19 Fr  4 days    Closed/Suction Drain Right RUQ Accordion 19 Fr  4 days    Ventriculostomy/Subdural Ventricular drainage catheter Right 4 days                      Code Status: Level 1 - Full Code     Portions of the record may have been created with voice recognition software  Occasional wrong word or "sound a like" substitutions may have occurred due to the inherent limitations of voice recognition software  Read the chart carefully and recognize, using context, where substitutions have occurred       Ritesh Fernandez

## 2018-01-30 NOTE — PROGRESS NOTES
01/30/18 1100   Spiritual Beliefs/Perceptions   Support Systems Spouse/significant other;Family members   Plan of Care   Comments Talked with  during visit  Pt  will have test done today,  said she is doing somewaht better  Provided pastoral presence and prayer support     Assessment Completed by: Unit visit

## 2018-01-30 NOTE — PROGRESS NOTES
I saw and evaluated pt today with multiple family members at the bedside as well as ICU team and RN  Consult note done by my PA today but unable to be signed currently  Patient was recently transferred to [facility] after a complicated hospital course at [facility]   She has a history of a gastric sleeve surgery by Dr Fransisca Romo at [facility] about a year ago and initially lost 100 lbs  She tells me that she had some reflux symptoms which prompted hernia repair surgery earlier this month  She also has a  shunt  She presented to [facility] after discharge in septic shock and was found to have a gastric perforation and is now status post ex lap times two as well as externalization of her  shunt  Cultures growing enterococcus   While at [facility] she had an esophageal stent placed to help aid in healing of her perforation  More recently she had increased leukocytosis which prompted a repeat CT scan which showed increased extravasation of contrast in the upper abdomen suggestive of persistent leak  ID following and antifungals added by them to her unasyn  Discussed stent with her gastroenterologist that put this in and also reviewed esophagram which shows the stent to be in good position and on repeat read showed the leak  I do not think putting in a stent within the stent would be helpful at this time given that the stent is in good position  I would just recommend keeping her NPO as this may take a long time to heal   I will discuss this with my interventional GI colleague as well  If signs of acute decompensation likely would require repeat surgical intervention  Will discuss with surgeon as well

## 2018-01-30 NOTE — PROGRESS NOTES
Progress Note - Neurosurgery   Lizbeth Spike 36 y o  female MRN: [id number]  Unit/Bed#: ICU 10 Encounter: [id number]    Assessment:  1  Procedure day 5 externalization of right-sided ventriculoperitoneal shunt  2  History of ventriculoperitoneal shunt for idiopathic intracranial hypertension (originally placed May 30, 2017)  3  Left upper extremity occlusive thrombophlebitis cephalic vein  4  Peritonitis   5  Gastric perforation status post repair    Plan:  50-year-old female transfer [facility] for esophageal stent status post upper gastric injury during laparoscopic paraesophageal hernia repair  Patient developed sepsis and required multiple operative procedures  Upon transfer to [address] [place] [address], she underwent a esophageal stent and externalization ventriculoperitoneal shunt  · Exam reveals diffuse weakness  AAO  Following commands  Dressing intact  · Imaging reviewed personally and by attending  Final results as below  · Shunt series:  Intact catheter with Codman Hakim shunt set to 70mm H2O  · Maintain externalized shunt at 8 mmHg  75mL over 24 hours  Clamp externalized drain  Monitor exam    · CSF sent - Glucose 56, RBC 0, Protein 25, WBC 0, gram stain no bacteria, culture pending  · Recommend ophthalmology evaluation  · Pain control per primary team   · Mobilize daily  · SSC following -  ADENIKE drains x4  Trend white count 32 71 (25 53)  · Repeat CT scan CAP w 1/30/18 with persistent leak  · Infectious disease following - continue on IV Unasyn anticipated 4-6 weeks  · DVT PPX:  Heparin SCDs on during exam   · Will continue to follow  Subjective/Objective   Chief Complaint: "I had a rough night"/Follow-up external;ized shunt    Subjective: Patient c/o abd discomfort and back pain/diffuse weakness  No HA currently  Denies vision changes  tolerating clears  Denies CP/SOB  Denies chills/sweats  Objective: Sitting up in bed  NAD      I/O       01/28 0701 - 01/29 0700 01/29 0701 - 01/30 0700 01/30 0701 - 01/31 0700    P  O  3040 700 780    I V  (mL/kg) 2416 7 (34 2) 561 7 (8)     IV Piggyback 400 400     Total Intake(mL/kg) 5856 7 (83) 1661 7 (23 5) 780 (11)    Urine (mL/kg/hr) 1025 (0 6) 1320 (0 8)     Drains 230 (0 1) 276 (0 2) 5 (0)    Total Output 1255 1596 5    Net +4601 7 +65 7 +775           Unmeasured Urine Occurrence 1 x            Invasive Devices     Peripherally Inserted Central Catheter Line            PICC Line 96/29/18 Right Basilic 9 days          Drain            Closed/Suction Drain Left LLQ Bulb 19 Fr  4 days    Closed/Suction Drain Left LUQ Bulb 19 Fr  4 days    Closed/Suction Drain Right RLQ Bulb 19 Fr  4 days    Closed/Suction Drain Right RUQ Accordion 19 Fr  4 days    Ventriculostomy/Subdural Ventricular drainage catheter Right 4 days                Physical Exam:  Vitals: Blood pressure 149/91, pulse (!) 106, temperature 98 6 °F (37 °C), temperature source Oral, resp  rate (!) 11, height 5' 4" (1 626 m), weight 70 6 kg (155 lb 10 3 oz), SpO2 97 %, not currently breastfeeding  ,Body mass index is 26 72 kg/m²        General appearance: alert, appears stated age, cooperative and uncomfortable  Head: Normocephalic, without obvious abnormality, atraumatic  Eyes: EOMI, PERRL  Neck: supple, symmetrical, trachea midline   Lungs: non labored breathing  Heart: tachycardia  Neurologic:   Mental status: Alert, oriented, thought content appropriate  Cranial nerves: grossly intact (Cranial nerves II-XII)  Sensory: normal to LT  Motor: moving all extremities with mild diffuse weakness, LUE edema and limited movement  Reflexes: no clonus  Coordination: finger to nose normal bilaterally, no drift bilaterally      Lab Results:    Results from last 7 days  Lab Units 01/30/18  0441 01/29/18  0501 01/28/18  0507   WBC Thousand/uL 32 71* 25 53* 13 54*   HEMOGLOBIN g/dL 9 9* 10 1* 8 7*   HEMATOCRIT % 29 3* 30 9* 26 4*   PLATELETS Thousands/uL 795* 753* 714*   MONO PCT MAN % 3* 4 2*       Results from last 7 days  Lab Units 01/30/18  0441 01/29/18  0501 01/28/18  0507  01/24/18  2244   SODIUM mmol/L 133* 136 141  < > 137   POTASSIUM mmol/L 3 9 4 1 4 4  < > 4 9   CHLORIDE mmol/L 95* 99* 104  < > 102   CO2 mmol/L 30 31 33*  < > 31   BUN mg/dL 21 22 24  < > 21   CREATININE mg/dL 0 37* 0 34* 0 36*  < > 0 37*   CALCIUM mg/dL 10 3* 9 9 10 1  < > 10 2*   TOTAL PROTEIN g/dL  --   --   --   --  5 3*   BILIRUBIN TOTAL mg/dL  --   --   --   --  0 91   ALK PHOS U/L  --   --   --   --  177*   ALT U/L  --   --   --   --  61   AST U/L  --   --   --   --  28   GLUCOSE RANDOM mg/dL 73 78 88  < > 109   GLUCOSE, ISTAT   --   --   --   < >  --    < > = values in this interval not displayed  Results from last 7 days  Lab Units 01/30/18  0441 01/28/18  0507 01/27/18  0425   MAGNESIUM mg/dL 1 8 1 7 2 0       Results from last 7 days  Lab Units 01/30/18  0441 01/28/18  0507 01/27/18  0425   PHOSPHORUS mg/dL 3 7 3 9 3 0       Results from last 7 days  Lab Units 01/24/18  1954   INR  1 26*   PTT seconds 28     No results found for: TROPONINT  ABG:  Lab Results   Component Value Date    PHART 7 484 (H) 01/25/2018    [id number] 33 6 (L) 01/25/2018    PO2ART 165 1 (H) 01/25/2018    JSU1ZJC 24 7 01/25/2018    BEART 1 6 01/25/2018    SOURCE Line, Arterial 01/25/2018       Imaging Studies: I have personally reviewed pertinent reports  and I have personally reviewed pertinent films in PACS    FL barium swallow   Final Result by Yaw Whitaker MD (01/28 1111)   Addendum 1 of 1 by Yaw Whitaker MD (01/30 1426)   Addendum: In retrospect, on the last overhead lateral image there is    contrast material posterior to the mid aspect of the stent  Previously    there was was believed to be within the bowel  Based on CT examination of    1/30/2018 this likely represents a    site of leak posterior to the gastroesophageal junction  This was not    seen dynamically and likely represents a delayed leak  Final      1    Gastroesophageal stent identified without evidence for leak  Slow passage of contrast material through the stent  There is some persistent stasis of contrast material within the stent at the end of the exam    2   Reflux of contrast is noted to the upper esophagus  Workstation performed: [id number]         CT chest abdomen pelvis w contrast   Final Result by Mitchel Mcgraw MD (01/30 0415)      There is extravasation of contrast in the upper abdomen in the periesophageal region with tracking of contrast in the perisplenic fluid collection and in the upper abdomen  Suggest persistent leak  This leak is better demonstrated on the barium swallow    performed on January 28, 2018       Multiple intra-abdominal fluid collections are decreasing in size      Bilateral large effusions with compressive atelectasis  I personally discussed this study with Dr Yonatan Antunez   on 1/30/2018 2:03 PM                            Workstation performed: [id number]         XR chest portable   Final Result by Ada Graham MD (01/27 1953)      1  Tip of NG feeding tube in location of the duodenal bulb  2   Consolidation in the base of the left lower lobe  3   Small bilateral pleural effusions  Workstation performed: [id number]         XR abdomen 1 view kub   Final Result by Corazon Simpson MD (01/27 2562)      Keofeed tube tip overlies the distal gastric region, appears in position  Workstation performed: [id number]         VAS upper limb venous duplex scan, unilateral/limited   Final Result by Ioana Duran MD (01/26 2217)      XR chest portable   Final Result by Niecy Cornelius DO (01/26 5929)      ET tube terminates just below the clavicular heads  Workstation performed: [id number]         XR abdomen 1 view kub   Final Result by Brendon Cerda MD (01/25 2042)      Fluoroscopic guidance for placement of feeding tube with tip in the 1st portion of the duodenum    Please refer to the separate procedure notes for additional details  Workstation performed: [id number]         CT chest abdomen pelvis w contrast   Final Result by Vikki Rojas MD (01/25 1159)      Extensive perihepatic subcapsular abscess which is in communication with a right paracolic gutter abscess  Drainage catheter within the left subhepatic space does not appear to communicate with this abscess  Large perisplenic abscess with mass effect on the spleen  Peripheral hypodensities within the spleen likely represent splenic infarcts  Infectious involvement less likely but not excluded  Large left paracolic gutter abscess  Mesenteric abscess as described  Pelvic abscess as described, pelvic drainage catheter is anterior to the abscess  Patient is status post placement of esophageal stent as well as gastric sleeve procedure  No evidence of extravasated oral contrast is identified on this exam       Dependent lower lobe atelectasis and small bilateral pleural effusions  Extensive subcutaneous edema suggesting anasarca  Soft tissue gas identified within the left axillary soft tissues, correlate for a potential iatrogenic etiology  If this does not exist, an infectious process related to a gas-forming organism is should be excluded  I personally discussed this study with Selina Lara on 1/25/2018 11:58 AM          Workstation performed: [id number]         XR shunt series   Final Result by Vikki Rojas MD (01/25 1116)      Intact  shunt catheter  Workstation performed: [id number]         XR chest portable   Final Result by Vikki Rojas MD (01/25 1002)      No active disease  Workstation performed: [id number]         CT head wo contrast   Final Result by Bhavik Franco MD (01/25 7854)      No acute intracranial abnormality  Right frontal approach catheter terminating in the region of the third ventricle  Ventricular system is decompressed          No evidence for interstitial edema or extra-axial fluid collections  Workstation performed: [id number]         IR consult    (Results Pending)         EKG, Pathology, and Other Studies: I have personally reviewed pertinent reports        VTE Pharmacologic Prophylaxis: Heparin    VTE Mechanical Prophylaxis: sequential compression device

## 2018-01-30 NOTE — PROGRESS NOTES
Progress Note - Critical Care   Zoila Ballard 36 y o  female MRN: [id number]  Unit/Bed#: ICU 10 Encounter: [id number]    Impression:  Principal Problem:    Gastric leak  Active Problems:    Acute peritonitis    Idiopathic intracranial hypertension    S/P  shunt     (ventriculoperitoneal) shunt status    Murmur, cardiac      Plan:    Neuro:   · No intubated or requiring sedation at this time  · Pain controlled with: PO gabapentin, oxycodone 5 (x1) and 10 (x3), and PRN IV dilaudid 0 5 (x6)  Consider increasing oxycodone to 10/15 liquid  ·  shunt 2/2 pseudotumor cerebri   · s/p externalization 1/25   · Purulent drainage intra-op - culture pending  · Set at [facility] with 75cc drainage overnight  · Regulate sleep/wake cycle    CV:   Hemodynamically stable  Not requiring pressors at this time  · Grade 3/6 systolic murmur   · MAP's > 65    Resp:   · No issues at this time    GI:   · Peritonitis 2/2 GE junction leak  · S/p EGD 1/24 with stent placed  · S/p ex-lap 1/25 for abdominal abscess and fluid collections  · 4 ADENIKE drains in place - all serosanguinous   · RUQ - none  · RLQ - 65ml   · LUQ - 6ml  · LLQ - 130ml  · Stress ulcer prophylaxis: protonix po  · Bowel regimen: senokot, miralax    F/E/N:   · Nutrition/diet plan: Bariatric full liquid  · Dietician concerned this will not meet caloric needs and would like to advance today  · Replete electrolytes with goals: K >4 0, Mag >2 0, and Phos >3 0  · Hyponatremia - 133   Continue to monitor    :   · Indwelling Giles present: no  · 1 3L UOP in 24 hrs   · Creatinine 0 37  · Trend UOP and BUN/creat  · Strict I and O    ID:   · Leukocytosis trending up - 32 7 from 25 yesterday, 13 5 previously  · Remained afebrile  · Repeat CT scan today - C/A/P w and w/o oral contrast  · Abx ordered: Unasyn  · ID following with plan for 4-6 weeks of abx therapy  · Chest & abdominal wall and catheter tip culture - enterococcus  · Fungal culture still pending  · CSF culture - negative  · Consider blood cultures and UA today    Heme:   · Hemoglobin stable at 9 9   · AC - heparin sub-q    Endo:   · Glycemic control plan: Algorithm 2    MSK/Skin:  · Frequent turning and pressure off-loading  · Local wound care as needed    Lines:  · Central venous access: R basilic PICC    VTE Prophylaxis:  · Pharmacologic Prophylaxis: heparin  · Mechanical Prophylaxis: sequential compression device    Disposition: Continue ICU care    Consultants: General surgery, Neurosurg    Reason for Admission: Abdominal abscess    HPI/24hr events:   Nothing significant overnight  Physical Exam:    Physical Exam   Constitutional: She is oriented to person, place, and time  She appears well-developed  No distress  HENT:   Head: Normocephalic and atraumatic  Eyes: EOM are normal  Pupils are equal, round, and reactive to light  Neck: Normal range of motion  Neck supple  No JVD present  No tracheal deviation present  Cardiovascular: Regular rhythm  Tachycardia present  Exam reveals no gallop and no friction rub  Murmur heard  Systolic murmur is present with a grade of 3/6   Pulmonary/Chest: Effort normal and breath sounds normal  No respiratory distress  She has no wheezes  She has no rales  Abdominal: Soft  She exhibits no distension  Bowel sounds are decreased  There is tenderness  Midline insicison with 4 ADENIKE drains in place  Diffuse abdominal tenderness, soft, non-distended  Musculoskeletal: Normal range of motion  Neurological: She is alert and oriented to person, place, and time  GCS eye subscore is 4  GCS verbal subscore is 5  GCS motor subscore is 6  Skin: Skin is warm and dry  She is not diaphoretic  Psychiatric: She has a normal mood and affect   Her behavior is normal        Vitals:   Vitals:    01/30/18 0719 01/30/18 0800 01/30/18 0900 01/30/18 1000   BP: 151/94 146/85 140/85 152/86   BP Location:  Right arm     Pulse: 102 100 104 96   Resp: 22 15  15   Temp:  98 8 °F (37 1 °C) TempSrc:  Oral     SpO2: 94% 93% 94% 91%   Weight:       Height:         Arterial Line BP: 166/82  Arterial Line MAP (mmHg): 112 mmHg    Temperature: Temp (24hrs), Av 5 °F (36 9 °C), Min:97 4 °F (36 3 °C), Max:99 1 °F (37 3 °C)  Current: Temperature: 98 8 °F (37 1 °C)    Weights: IBW: 54 7 kg  Body mass index is 26 72 kg/m²  Hemodynamic Monitoring:  N/A       Respiratory:  SpO2: SpO2: 91 %, SpO2 Activity: SpO2 Activity: At Rest, SpO2 Device: O2 Device: None (Room air)  O2 Flow Rate (L/min): 2 L/min    Intake and Outputs:    Intake/Output Summary (Last 24 hours) at 18 1039  Last data filed at 18 1001   Gross per 24 hour   Intake          2241 67 ml   Output             1601 ml   Net           640 67 ml     I/O last 24 hours: In: 2441 7 [P O :1480; I V :561 7; IV Piggyback:400]  Out: 1474 [Urine:1320; Drains:281]    Stool:      Nutrition:        Diet Orders            Start     Ordered    18 1028  Diet Bariatric; Bariatric Full Liquid; Lactose Restricted  Diet effective now     Question Answer Comment   Diet Type Bariatric    Bariatric Bariatric Full Liquid    Other Restriction(s): Lactose Restricted    RD to adjust diet per protocol?  No        18 1029            Labs:     Results from last 7 days  Lab Units 18  0441 18  0501 18  0507   WBC Thousand/uL 32 71* 25 53* 13 54*   HEMOGLOBIN g/dL 9 9* 10 1* 8 7*   HEMATOCRIT % 29 3* 30 9* 26 4*   PLATELETS Thousands/uL 795* 753* 714*   MONO PCT MAN % 3* 4 2*       Results from last 7 days  Lab Units 18  0441 18  0501 18  0507  18  2244   SODIUM mmol/L 133* 136 141  < > 137   POTASSIUM mmol/L 3 9 4 1 4 4  < > 4 9   CHLORIDE mmol/L 95* 99* 104  < > 102   CO2 mmol/L 30 31 33*  < > 31   BUN mg/dL 21 22 24  < > 21   CREATININE mg/dL 0 37* 0 34* 0 36*  < > 0 37*   CALCIUM mg/dL 10 3* 9 9 10 1  < > 10 2*   TOTAL PROTEIN g/dL  --   --   --   --  5 3*   BILIRUBIN TOTAL mg/dL  --   --   --   --  0 91   ALK PHOS U/L  --   --   --   --  177*   ALT U/L  --   --   --   --  61   AST U/L  --   --   --   --  28   GLUCOSE RANDOM mg/dL 73 78 88  < > 109   GLUCOSE, ISTAT   --   --   --   < >  --    < > = values in this interval not displayed  Results from last 7 days  Lab Units 01/30/18  0441 01/28/18  0507 01/27/18  0425   MAGNESIUM mg/dL 1 8 1 7 2 0       Results from last 7 days  Lab Units 01/30/18  0441 01/28/18  0507 01/27/18  0425   PHOSPHORUS mg/dL 3 7 3 9 3 0        Results from last 7 days  Lab Units 01/24/18 1954   INR  1 26*   PTT seconds 28       Results from last 7 days  Lab Units 01/25/18  2118 01/24/18 1954   LACTIC ACID mmol/L 1 3 1 5           Results from last 7 days  Lab Units 01/25/18  2252 01/25/18  2125   PH ART  7 484* 7 508*   PCO2 ART mm Hg 33 6* 32 4*   PO2 ART mm Hg 165 1* 165 0*   HCO3 ART mmol/L 24 7 25 2   BASE EXC ART mmol/L 1 6 2 5   ABG SOURCE  Line, Arterial Line, Arterial               Imaging:   Xr Chest Portable    Result Date: 1/25/2018  Impression: No active disease  Workstation performed: [id number]     Xr Abdomen 1 View Kub    Result Date: 1/25/2018  Impression: Fluoroscopic guidance for placement of feeding tube with tip in the 1st portion of the duodenum  Please refer to the separate procedure notes for additional details  Workstation performed: [id number]     Ct Head Wo Contrast    Result Date: 1/25/2018  Impression: No acute intracranial abnormality  Right frontal approach catheter terminating in the region of the third ventricle  Ventricular system is decompressed  No evidence for interstitial edema or extra-axial fluid collections  Workstation performed: [id number]     Ct Chest Abdomen Pelvis W Contrast    Result Date: 1/25/2018  Impression: Extensive perihepatic subcapsular abscess which is in communication with a right paracolic gutter abscess  Drainage catheter within the left subhepatic space does not appear to communicate with this abscess   Large perisplenic abscess with mass effect on the spleen  Peripheral hypodensities within the spleen likely represent splenic infarcts  Infectious involvement less likely but not excluded  Large left paracolic gutter abscess  Mesenteric abscess as described  Pelvic abscess as described, pelvic drainage catheter is anterior to the abscess  Patient is status post placement of esophageal stent as well as gastric sleeve procedure  No evidence of extravasated oral contrast is identified on this exam  Dependent lower lobe atelectasis and small bilateral pleural effusions  Extensive subcutaneous edema suggesting anasarca  Soft tissue gas identified within the left axillary soft tissues, correlate for a potential iatrogenic etiology  If this does not exist, an infectious process related to a gas-forming organism is should be excluded  I personally discussed this study with Peyton Miramontes on 1/25/2018 11:58 AM  Workstation performed: [id number]     BE Shunt Series    Result Date: 1/25/2018  Impression: Intact  shunt catheter  Workstation performed: [id number]       Micro:   Blood Culture: No results found for: Hiral Lala  Urine Culture: No results found for: URINECX  Sputum Culture: No components found for: SPUTUMCX  Wound Culure:   Lab Results   Component Value Date    WOUNDCULT 2+ Growth of Enterococcus faecalis (A) 01/25/2018       Results from last 7 days  Lab Units 01/25/18  1851 01/25/18  1819 01/25/18  1704   GRAM STAIN RESULT  No Polys or Bacteria seen 3+ Polys  No bacteria seen 1+ Polys  No bacteria seen   WOUND CULTURE   --  2+ Growth of Enterococcus faecalis*  --        Allergies:    Allergies   Allergen Reactions    Benadryl [Diphenhydramine] Swelling    Phenergan [Promethazine] Hives       Medications:   Scheduled Meds:    Current Facility-Administered Medications:  ampicillin-sulbactam 3 g Intravenous Q6H Dale Bettencourt MD Last Rate: 3 g (01/30/18 0912)   gabapentin 300 mg Oral HS Staci Delacruz PA-C    heparin (porcine) 5,000 Units Subcutaneous Q8H [facility] ABNER Tse    HYDROmorphone 0 5 mg Intravenous Q3H PRN Nicole Boyce PA-C    insulin lispro 1-5 Units Subcutaneous Q6H [facility] Lucia Torrez MD    iohexol 50 mL Oral 90 min pre-procedure Jaison Isbell MD    multivitamin 1 tablet Oral Daily Nicole Boyce PA-C    nortriptyline 10 mg Oral BID Nicole Boyce PA-C    oxyCODONE 5 mg Oral Q4H PRN Nicole Boyce PA-C    Or        oxyCODONE 10 mg Oral Q4H PRN Nicole Boyce PA-C    pantoprazole 40 mg Oral Early Morning Nicole Boyce PA-C    polyethylene glycol 17 g Oral Daily PRN Nicole Boyce PA-C    senna-docusate sodium 1 tablet Oral HS ABNER Tse    thiamine 100 mg Oral Daily Nicole Boyce PA-C      Continuous Infusions:   PRN Meds:    HYDROmorphone 0 5 mg Q3H PRN   oxyCODONE 5 mg Q4H PRN   Or     oxyCODONE 10 mg Q4H PRN   polyethylene glycol 17 g Daily PRN       Invasive lines and devices:   Invasive Devices     Peripherally Inserted Central Catheter Line            PICC Line 50/50/69 Right Basilic 9 days          Drain            Closed/Suction Drain Left LLQ Bulb 19 Fr  4 days    Closed/Suction Drain Left LUQ Bulb 19 Fr  4 days    Closed/Suction Drain Right RLQ Bulb 19 Fr  4 days    Closed/Suction Drain Right RUQ Accordion 19 Fr  4 days    Ventriculostomy/Subdural Ventricular drainage catheter Right 4 days                Code Status: Level 1 - Full Code        SIGNATURE: Colonel Rachelle PA-C  DATE: January 30, 2018  TIME: 10:39 AM

## 2018-01-30 NOTE — PROGRESS NOTES
Critical Care Interval Progress Note:   Ayaz Wellington 36 y o  female MRN: [id number]    Unit/Bed#: ICU 10 Encounter: [id number]    Summary of Critical Care:    CT results reviewed with surgery; discussion between surgery, IR and GI regarding management of persistent periesophageal leak, likely planning for GI placed stent for control but will follow-up plan with general surgery  In the interim broadening coverage to include antifungals in the setting of persistent leak  I spoke with ID Dr Mark Mansfield whom recommended ok to add diflucan  Counseling / Coordination of Care: Total Critical Care time spent 6 minutes excluding procedures, teaching and family updates

## 2018-01-30 NOTE — PROGRESS NOTES
Progress Note - Infectious Disease   [place] Arrow 36 y o  female MRN: [id number]  Unit/Bed#: ICU 10 Encounter: [id number]      Impression/Recommendations:  1   Intra-abdominal/pelvic abscesses   Patient is now status post repeat abdominal washout   She has multiple drains in place   She is clinically well and systemically stable  Operative culture from [facility], growing only ampicillin susceptible Enterococcus   Operative culture here also growing  only ampicillin susceptible Enterococcus  Continue with IV Unasyn  Continue drains per IR and surgery service      2   Possible infected  shunt   Given presence of tip of  shunt in peritoneal space, there is high probability of  shunt infection   Fortunately, patient has no symptoms concerning for meningitis   She has neurological deficits   She is status post tapping of  shunt at [facility]   CSF culture there had no growth but patient had prior antibiotic  Nas Seek will go ahead and treat her for possible/presumptive  shunt infection   Patient is now status post  shunt externalization   CSF culture here also negative       Antibiotic plan as in above  Likely treat with IV antibiotic x 4-6 weeks      3   Gastric perforation, status post repair, with persistent leak   She is now status post placement of esophageal stent  Repeat video barium swallow from last week with no further leakage  Unfortunately, abdomen/pelvis CT today showed recurrent week  Plan for control of leak per surgery and G  I  service      4   Recurrent leukocytosis  Patient has no fever  She is clinically and systemically stable  This is most likely secondary to recurrent leak in upper abdomen  With patient having been on broad spectrum antibiotic for the last 2 weeks, it is reasonable to at antifungal coverage  However, I suspect this is a mechanical issue and not lack of anti-infective coverage  Antibiotic plan as in above  Addition of fluconazole noted    Monitor WBC and temperature      5  Septic shock, on presentation at [facility]   This is secondary to gastric perforation   Patient is now hemodynamically stable   All cultures from [facility] obtained and reviewed, now on chart   All blood cultures were negative   Urine culture negative   CSF culture negative   Operative culture positive for Enterococcus, as in above  Antibiotic plan as in above  Monitor hemodynamics      Discussed with the patient and her fiancée in detail regarding above plan  Discussed with critical care service      Antibiotics:  Unasyn  POD # 5     Subjective:  Patient is comfortable  Abdominal pain improving, controlled  No headache  No chest wall pain  Patient remains in ICU  Temperature stays down  No chills  She is tolerating antibiotic well  No nausea, vomiting or diarrhea  Objective:  Vitals:  HR:  [] 106  Resp:  [11-24] 11  BP: ()/(57-98) 149/91  SpO2:  [88 %-100 %] 97 %  Temp (24hrs), Av 5 °F (36 9 °C), Min:97 4 °F (36 3 °C), Max:99 1 °F (37 3 °C)  Current: Temperature: 98 6 °F (37 °C)    Physical Exam:     General: Awake, alert, cooperative, no distress  Chest:   VPS externalization site clean  No drainage  No erythema  No tenderness  Lungs: Expansion symmetric, no rales, no wheezing, respirations unlabored  Heart[de-identified]  Regular rate and rhythm, S1 and S2 normal, no murmur  Abdomen: Soft, mildly distended, mild/moderate incisional tenderness, bowel sounds active all four quadrants,        no masses, no organomegaly  Drains seropurulent  Extremities: No edema  No erythema/warmth  No ulcer  Nontender to palpation  Skin:  No rash       Invasive Devices     Peripherally Inserted Central Catheter Line            PICC Line 66/45/10 Right Basilic 9 days          Drain            Closed/Suction Drain Left LLQ Bulb 19 Fr  4 days    Closed/Suction Drain Left LUQ Bulb 19 Fr  4 days    Closed/Suction Drain Right RLQ Bulb 19 Fr  4 days    Closed/Suction Drain Right RUQ Accordion 19 Fr  4 days    Ventriculostomy/Subdural Ventricular drainage catheter Right 4 days                Labs studies:   I have personally reviewed pertinent labs  Results from last 7 days  Lab Units 01/30/18  0441 01/29/18  0501 01/28/18  0507  01/24/18  2244   SODIUM mmol/L 133* 136 141  < > 137   POTASSIUM mmol/L 3 9 4 1 4 4  < > 4 9   CHLORIDE mmol/L 95* 99* 104  < > 102   CO2 mmol/L 30 31 33*  < > 31   ANION GAP mmol/L 8 6 4  < > 4   BUN mg/dL 21 22 24  < > 21   CREATININE mg/dL 0 37* 0 34* 0 36*  < > 0 37*   EGFR ml/min/1 73sq m 131 135 132  < > 131   GLUCOSE RANDOM mg/dL 73 78 88  < > 109   GLUCOSE, ISTAT   --   --   --   < >  --    CALCIUM mg/dL 10 3* 9 9 10 1  < > 10 2*   AST U/L  --   --   --   --  28   ALT U/L  --   --   --   --  61   ALK PHOS U/L  --   --   --   --  177*   TOTAL PROTEIN g/dL  --   --   --   --  5 3*   BILIRUBIN TOTAL mg/dL  --   --   --   --  0 91   < > = values in this interval not displayed  Results from last 7 days  Lab Units 01/30/18  0441 01/29/18  0501 01/28/18  0507   WBC Thousand/uL 32 71* 25 53* 13 54*   HEMOGLOBIN g/dL 9 9* 10 1* 8 7*   PLATELETS Thousands/uL 795* 753* 714*       Results from last 7 days  Lab Units 01/30/18  1151 01/25/18  1851 01/25/18  1819 01/25/18  1704   GRAM STAIN RESULT  No Polys or Bacteria seen No Polys or Bacteria seen 3+ Polys  No bacteria seen 1+ Polys  No bacteria seen   WOUND CULTURE   --   --  2+ Growth of Enterococcus faecalis*  --        Imaging Studies:   I have personally reviewed pertinent imaging study reports and images in PACS  Chest/abdomen/pelvis CT reviewed personally  There is extravasation of contrast in the upper abdomen  Intra-abdominal abscesses are decreasing in size  EKG, Pathology, and Other Studies:   I have personally reviewed pertinent reports

## 2018-01-30 NOTE — CONSULTS
Consultation - [facility]  Octavia Srinivasan 36 y o  female MRN: [id number]  Unit/Bed#: ICU 10 Encounter: [id number]        ASSESSMENT/PLAN:  Gastric perforation     S/p gastric sleeve & paraesophageal repair w/ gastric leak/ GE junction, peritonitis, s/p stent placement 1/24  CT showing continued leak  I don't feel placing another stent will be of much benefit  Will discuss with Dr Delia Mittal as well as Dr Joni Lopez for another further recommendation  I agree with started TPN & making pt NPO until further notice  Continue Abx as per ID recommendation  Consider thoracic surgery consult for possible further management  Consults    Reason for Consult / Principal Problem: Gastric leak    HPI: Octavia Srinivasan is a 36y o  year old female with a PMH of gastric sleeve 1 yr ago,  shunt for psuedotumor, who had a lap paraesophageal hernia repair done 3 weeks ago  She was being followed at [facility] when she was admitted on 1/14  She presented with peritonitis & septic shock  She had emergency lap showing "gastric perforation", reportedly patched & sewn  Post-op day 9 showed large fluid collection & a percutaneous drain was placed  Upper GI showed leak at GE junction & she was transferred to [facility] for further care  She had EGD with stent placed by Dr Delia Mittal  There was also concern for  shunt infection & this was removed & externalized  She has been followed closely by ID & surgery  Barium swallow was done prior to initiation of feedings to sure no leak  The report was read as no leak  Feeding was started & she developed increased WBC  CT was repeated showing persistent leak as well as new fluid collections  Review of Systems: as per HPI  Review of Systems   All other systems reviewed and are negative  Historical Information   History reviewed  No pertinent past medical history    Past Surgical History:   Procedure Laterality Date    LAPAROTOMY N/A 1/25/2018 Procedure: Exploratory Laparotomy, wash out,placement of drains, placement of NG feeding tube ; Surgeon: Beatrice García DO;  [facility]: [facility];  Service: General    MI REPLACEMENT/REVISION,CSF SHUNT Right 1/25/2018    Procedure: Externalization of right-sided SHUNT VENTRICULAR-PERITONEAL in anterior chest wall ribs two and three level  ;  Surgeon: Adelaide Atkinson MD;  Location[facility];  Service: Neurosurgery     Social History   History   Alcohol Use No     History   Drug Use No     History   Smoking Status    Never Smoker   Smokeless Tobacco    Never Used     History reviewed  No pertinent family history      Meds/Allergies     Prescriptions Prior to Admission   Medication    gabapentin (NEURONTIN) 300 mg capsule    nortriptyline (PAMELOR) 10 mg capsule    pantoprazole (PROTONIX) 40 mg tablet     Current Facility-Administered Medications   Medication Dose Route Frequency    Adult 3-in-1 TPN (standard base / standard electrolytes)   Intravenous Continuous    ampicillin-sulbactam (UNASYN) 3 g in sodium chloride 0 9 % 100 mL IVPB  3 g Intravenous Q6H    fluconazole (DIFLUCAN) IVPB (premix) 400 mg  400 mg Intravenous Q24H    gabapentin (NEURONTIN) capsule 300 mg  300 mg Oral HS    heparin (porcine) subcutaneous injection 5,000 Units  5,000 Units Subcutaneous Q8H [facility]    HYDROmorphone (DILAUDID) injection 0 5 mg  0 5 mg Intravenous Q3H PRN    insulin lispro (HumaLOG) 100 units/mL subcutaneous injection 1-5 Units  1-5 Units Subcutaneous Q6H [facility]    iohexol (OMNIPAQUE) 240 MG/ML solution 50 mL  50 mL Oral 90 min pre-procedure    multivitamin (FLINTSTONES) chewable tablet 1 tablet  1 tablet Oral Daily    nortriptyline (PAMELOR) capsule 10 mg  10 mg Oral BID    oxyCODONE (ROXICODONE) oral solution 15 mg  15 mg Oral Q4H PRN    Or    oxyCODONE (ROXICODONE) oral solution 10 mg  10 mg Oral Q4H PRN    pantoprazole (PROTONIX) EC tablet 40 mg  40 mg Oral Early Morning    polyethylene glycol (MIRALAX) packet 17 g  17 g Oral Daily PRN    senna-docusate sodium (SENOKOT S) 8 6-50 mg per tablet 1 tablet  1 tablet Oral HS    sodium chloride 0 9 % infusion  125 mL/hr Intravenous Continuous    thiamine (VITAMIN B1) tablet 100 mg  100 mg Oral Daily       Allergies   Allergen Reactions    Benadryl [Diphenhydramine] Swelling    Phenergan [Promethazine] Hives       Objective     Blood pressure 149/91, pulse (!) 106, temperature 98 6 °F (37 °C), temperature source Oral, resp  rate (!) 11, height 5' 4" (1 626 m), weight 70 6 kg (155 lb 10 3 oz), SpO2 97 %, not currently breastfeeding  Intake/Output Summary (Last 24 hours) at 01/30/18 1629  Last data filed at 01/30/18 1501   Gross per 24 hour   Intake             2685 ml   Output             1709 ml   Net              976 ml       PHYSICAL EXAM     Physical Exam   Constitutional: She is oriented to person, place, and time  She appears well-developed and well-nourished  No distress  HENT:   Head: Normocephalic and atraumatic  Eyes: Conjunctivae are normal    Neck: Normal range of motion  Cardiovascular: Normal rate and regular rhythm  Pulmonary/Chest: Effort normal and breath sounds normal    Abdominal: Soft  Bowel sounds are normal  There is tenderness  Surgical staples; no erythema or drainage; multiple drains present   Musculoskeletal: She exhibits edema  Neurological: She is alert and oriented to person, place, and time  Skin: Skin is warm and dry  Psychiatric: She has a normal mood and affect   Her behavior is normal        Lab Results:   CBC: Lab Results   Component Value Date    WBC 32 71 (HH) 01/30/2018    HGB 9 9 (L) 01/30/2018    HCT 29 3 (L) 01/30/2018    MCV 89 01/30/2018     (H) 01/30/2018    MCH 30 2 01/30/2018    MCHC 33 8 01/30/2018    RDW 14 8 01/30/2018    MPV 9 3 01/30/2018    NRBC 0 01/30/2018    NRBC 1 01/30/2018   ,   CMP: Lab Results   Component Value Date     (L) 01/30/2018    K 3 9 01/30/2018    CL 95 (L) 01/30/2018    CO2 30 01/30/2018    ANIONGAP 8 01/30/2018    BUN 21 01/30/2018    CREATININE 0 37 (L) 01/30/2018    GLUCOSE 73 01/30/2018    CALCIUM 10 3 (H) 01/30/2018    EGFR 131 01/30/2018   ,   Lipase: No results found for: LIPASE,  PT/INR: No results found for: PT, INR,   Troponin: No results found for: TROPONINI,   Magnesium: No results found for: MAG,   Phosphorous:   Lab Results   Component Value Date    PHOS 3 7 01/30/2018     Imaging Studies: I have personally reviewed pertinent reports  CT chest/ abd/ pelvis: There is extravasation of contrast in the upper abdomen in the periesophageal region with tracking of contrast in the perisplenic fluid collection and in the upper abdomen  Suggest persistent leak  This leak is better demonstrated on the barium swallow   performed on January 28, 2018      Multiple intra-abdominal fluid collections are decreasing in size     Bilateral large effusions with compressive atelectasis  Barium swallow: Addendum: In retrospect, on the last overhead lateral image there is contrast material posterior to the mid aspect of the stent  Previously there was was believed to be within the bowel  Based on CT examination of 1/30/2018 this likely represents a   site of leak posterior to the gastroesophageal junction  This was not seen dynamically and likely represents a delayed leak  IMPRESSION:     1   Gastroesophageal stent identified without evidence for leak  Slow passage of contrast material through the stent    There is some persistent stasis of contrast material within the stent at the end of the exam   2   Reflux of contrast is noted to the upper esophagus

## 2018-01-30 NOTE — PROGRESS NOTES
Progress Note - General Surgery   Akila Perez 36 y o  female MRN: [id number]  Unit/Bed#: ICU 10 Encounter: [id number]    Assessment:   43y/o F transfer from [facility] with history of laparoscopic sleeve gastrectomy with  shunt shortening on 12/19/16, lap hiatal hernia repair on 1/11/18 , ex laparoscopic repair of gastric perforation on 1/14     - status post externalization of R ventricular-peritoneal shunt to anterior chest wall ribs 2-3 on 1/25/18  - Status post ex - lap washout and placement of drains & placement of NG feeding tube 1/25/18    Plan:  Continue bariatric fulls w/ supplements- patient to bring shakes from home  Obtain CT C/A/P (PO & IV contrast) today given rising WBC - not unexpected to have a collection given amount of spillage/contamination that had been present  PRN pain control  Continue Unasyn per ID 4-6 wks      Subjective/Objective   Chief Complaint: None    Subjective: Has some pain this AM, feels she slept funny, but otherwise feeling better  Tolerating fulls without N/V  + flatus    Objective:     Blood pressure 155/80, pulse 104, temperature (!) 97 4 °F (36 3 °C), temperature source Oral, resp  rate (!) 23, height 5' 4" (1 626 m), weight 70 6 kg (155 lb 10 3 oz), SpO2 93 %, not currently breastfeeding  ,Body mass index is 26 72 kg/m²        Intake/Output Summary (Last 24 hours) at 01/30/18 0529  Last data filed at 01/30/18 0448   Gross per 24 hour   Intake          2461 67 ml   Output             1695 ml   Net           766 67 ml       Invasive Devices     Peripherally Inserted Central Catheter Line            PICC Line 86/79/21 Right Basilic 9 days          Drain            Closed/Suction Drain Left LLQ Bulb 19 Fr  4 days    Closed/Suction Drain Left LUQ Bulb 19 Fr  4 days    Closed/Suction Drain Right RLQ Bulb 19 Fr  4 days    Closed/Suction Drain Right RUQ Accordion 19 Fr  4 days    Ventriculostomy/Subdural Ventricular drainage catheter Right 4 days Physical Exam:   Gen: A&O, NAD  Cardio: RRR  Lungs: CTAB  Abd: Soft, non distended, slightly tender  Midline staples c/d/i  ADENIKE x 4, more serous in appearance  RUQ still serosang         Lab, Imaging and other studies:  CBC:   Lab Results   Component Value Date    WBC 32 71 (HH) 01/30/2018    HGB 9 9 (L) 01/30/2018    HCT 29 3 (L) 01/30/2018    MCV 89 01/30/2018     (H) 01/30/2018    MCH 30 2 01/30/2018    MCHC 33 8 01/30/2018    RDW 14 8 01/30/2018    MPV 9 3 01/30/2018    NRBC 0 01/30/2018   , CMP: No results found for: NA, K, CL, CO2, ANIONGAP, BUN, CREATININE, GLUCOSE, CALCIUM, AST, ALT, ALKPHOS, PROT, ALBUMIN, BILITOT, EGFR  VTE Pharmacologic Prophylaxis: Heparin  VTE Mechanical Prophylaxis: sequential compression device

## 2018-01-30 NOTE — CONSULTS
Consultation - Bariatric Surgery   Dru Mortimer 36 y o  female MRN: [id number]  Unit/Bed#: ICU 10 Encounter: [id number]    Assessment and Plan:  45yo female with h/o gastric sleeve in 2016 for morbid obesity,  shunt for pseudotumor cerebri  Recent laparoscopic hiatal hernia complicated by gastric perforation s/p ex-lap with placement of drains with externalization of  shunt    Continue care in the ICU  Appreciate neurosurgery recs for management of  shunt  Attending to advise on plan      History of Present Illness   HPI:  Dru Mortimer is a 36 y o  female who presents with gastric perforation  She has a h/o morbid obesity for which she underwent laparoscopic sleeve gastrectomy in 2016  Also h/o  shunt for pseudotumor cerebri  She has done well with current BMI of 26 7  Recently had laparoscopic hiatal hernia repair complicated by gastric perforation and subsequent exploratory laparotomy and multiple drain placement at [facility]  Patient was eventually transferred to [facility] for higher level of care and placement of esophageal stent when she developed sepsis and worsening abdominal fluid collection  She has undergone another ex-lap with exchange of drains and also externalization of her  shunt by neurosurgery  She remains in the ICU for management of sepsis but clinically improving  Consults    Review of Systems  Otherwise unremarkable except as per HPI    Historical Information   History reviewed  No pertinent past medical history  Past Surgical History:   Procedure Laterality Date    LAPAROTOMY N/A 2018    Procedure: Exploratory Laparotomy, wash out,placement of drains, placement of NG feeding tube ;   Surgeon: Enrique Nam DO;  [facility];  Service: General    SD REPLACEMENT/REVISION,CSF SHUNT Right 2018    Procedure: Externalization of right-sided SHUNT VENTRICULAR-PERITONEAL in anterior chest wall ribs two and three level  ;  Surgeon: Brice Apgar, MD;  [facility];  Service: Neurosurgery     Social History   History   Alcohol Use No     History   Drug Use No     History   Smoking Status    Never Smoker   Smokeless Tobacco    Never Used     History reviewed  No pertinent family history      Meds/Allergies   current meds:   Current Facility-Administered Medications   Medication Dose Route Frequency    ampicillin-sulbactam (UNASYN) 3 g in sodium chloride 0 9 % 100 mL IVPB  3 g Intravenous Q6H    gabapentin (NEURONTIN) capsule 300 mg  300 mg Oral HS    heparin (porcine) subcutaneous injection 5,000 Units  5,000 Units Subcutaneous Q8H [address]    HYDROmorphone (DILAUDID) injection 0 5 mg  0 5 mg Intravenous Q3H PRN    insulin lispro (HumaLOG) 100 units/mL subcutaneous injection 1-5 Units  1-5 Units Subcutaneous Q6H [address]    multivitamin (FLINTSTONES) chewable tablet 1 tablet  1 tablet Oral Daily    nortriptyline (PAMELOR) capsule 10 mg  10 mg Oral BID    oxyCODONE (ROXICODONE) IR tablet 5 mg  5 mg Oral Q4H PRN    Or    oxyCODONE (ROXICODONE) immediate release tablet 10 mg  10 mg Oral Q4H PRN    pantoprazole (PROTONIX) EC tablet 40 mg  40 mg Oral Early Morning    polyethylene glycol (MIRALAX) packet 17 g  17 g Oral Daily PRN    senna-docusate sodium (SENOKOT S) 8 6-50 mg per tablet 1 tablet  1 tablet Oral HS    thiamine (VITAMIN B1) tablet 100 mg  100 mg Oral Daily     Allergies   Allergen Reactions    Benadryl [Diphenhydramine] Swelling    Phenergan [Promethazine] Hives       Objective   First Vitals:   Blood Pressure: 130/69 (01/24/18 1823)  Pulse: 94 (01/24/18 1823)  Temperature: 98 6 °F (37 °C) (01/24/18 1823)  Temp Source: Rectal (01/24/18 1823)  Respirations: 17 (01/24/18 1823)  Height: 5' 4" (162 6 cm) (01/24/18 1823)  Weight - Scale: 70 9 kg (156 lb 4 9 oz) (01/24/18 1823)  SpO2: 100 % (01/24/18 1823)    Current Vitals:   Blood Pressure: 144/80 (01/30/18 0200)  Pulse: (!) 106 (01/30/18 0200)  Temperature: (!) 97 4 °F (36 3 °C) (01/30/18 0000)  Temp Source: Oral (01/30/18 0000)  Respirations: 18 (01/30/18 0200)  Height: 5' 4" (162 6 cm) (01/24/18 1823)  Weight - Scale: 70 6 kg (155 lb 10 3 oz) (01/29/18 0559)  SpO2: 91 % (01/30/18 0200)      Intake/Output Summary (Last 24 hours) at 01/30/18 0251  Last data filed at 01/29/18 2245   Gross per 24 hour   Intake          2671 67 ml   Output             1569 ml   Net          1102 67 ml       Invasive Devices     Peripherally Inserted Central Catheter Line            PICC Line 60/19/55 Right Basilic 9 days          Drain            Closed/Suction Drain Left LLQ Bulb 19 Fr  4 days    Closed/Suction Drain Left LUQ Bulb 19 Fr  4 days    Closed/Suction Drain Right RLQ Bulb 19 Fr  4 days    Closed/Suction Drain Right RUQ Accordion 19 Fr  4 days    Ventriculostomy/Subdural Ventricular drainage catheter Right 4 days                Physical Exam  Gen: no acute distress  HENT: NC/AT  Neck: Supple, No lympmphadenopathy  CV: RRR, no MRG  Pulm: Lungs CTAB  GI: abdomen soft, ADENIKE drains serous  Incisions clean  Extremities: no clubbing or cyanosis   No joint swelling    Lab Results:   CBC:   Lab Results   Component Value Date    WBC 25 53 (H) 01/29/2018    HGB 10 1 (L) 01/29/2018    HCT 30 9 (L) 01/29/2018    MCV 91 01/29/2018     (H) 01/29/2018    MCH 29 6 01/29/2018    MCHC 32 7 01/29/2018    RDW 14 5 01/29/2018    MPV 9 1 01/29/2018    NRBC 0 01/29/2018   , CMP:   Lab Results   Component Value Date     01/29/2018    K 4 1 01/29/2018    CL 99 (L) 01/29/2018    CO2 31 01/29/2018    ANIONGAP 6 01/29/2018    BUN 22 01/29/2018    CREATININE 0 34 (L) 01/29/2018    GLUCOSE 78 01/29/2018    CALCIUM 9 9 01/29/2018    EGFR 135 01/29/2018   , Coagulation: No results found for: PT, INR, APTT, Urinalysis: No results found for: COLORU, CLARITYU, SPECGRAV, PHUR, LEUKOCYTESUR, NITRITE, PROTEINUA, GLUCOSEU, KETONESU, BILIRUBINUR, BLOODU, Amylase: No results found for: AMYLASE, Lipase: No results found for: LIPASE  Imaging: I have personally reviewed pertinent films in PACS  EKG, Pathology, and Other Studies: I have personally reviewed pertinent films in PACS    Counseling / Coordination of Care  Total floor / unit time spent today 25 minutes  Greater than 50% of total time was spent with the patient and / or family counseling and / or coordination of care      Yenifer Grajeda MD  1/30/2018 2:51 AM

## 2018-01-31 ENCOUNTER — APPOINTMENT (INPATIENT)
Dept: RADIOLOGY | Facility: HOSPITAL | Age: 43
DRG: 711 | End: 2018-01-31
Payer: COMMERCIAL

## 2018-01-31 PROBLEM — Z78.9 REFUSAL OF BLOOD PRODUCT: Status: ACTIVE | Noted: 2018-01-31

## 2018-01-31 LAB
ANION GAP SERPL CALCULATED.3IONS-SCNC: 7 MMOL/L (ref 4–13)
ANISOCYTOSIS BLD QL SMEAR: PRESENT
ATRIAL RATE: 98 BPM
BASOPHILS # BLD MANUAL: 0 THOUSAND/UL (ref 0–0.1)
BASOPHILS NFR MAR MANUAL: 0 % (ref 0–1)
BUN SERPL-MCNC: 16 MG/DL (ref 5–25)
CALCIUM SERPL-MCNC: 9.8 MG/DL (ref 8.3–10.1)
CHLORIDE SERPL-SCNC: 96 MMOL/L (ref 100–108)
CO2 SERPL-SCNC: 32 MMOL/L (ref 21–32)
CREAT SERPL-MCNC: 0.31 MG/DL (ref 0.6–1.3)
EOSINOPHIL # BLD MANUAL: 0.24 THOUSAND/UL (ref 0–0.4)
EOSINOPHIL NFR BLD MANUAL: 1 % (ref 0–6)
ERYTHROCYTE [DISTWIDTH] IN BLOOD BY AUTOMATED COUNT: 15.5 % (ref 11.6–15.1)
GFR SERPL CREATININE-BSD FRML MDRD: 139 ML/MIN/1.73SQ M
GLUCOSE SERPL-MCNC: 111 MG/DL (ref 65–140)
GLUCOSE SERPL-MCNC: 121 MG/DL (ref 65–140)
GLUCOSE SERPL-MCNC: 127 MG/DL (ref 65–140)
GLUCOSE SERPL-MCNC: 148 MG/DL (ref 65–140)
GLUCOSE SERPL-MCNC: 151 MG/DL (ref 65–140)
GLUCOSE SERPL-MCNC: 158 MG/DL (ref 65–140)
HCT VFR BLD AUTO: 27 % (ref 34.8–46.1)
HGB BLD-MCNC: 9 G/DL (ref 11.5–15.4)
LYMPHOCYTES # BLD AUTO: 0.24 THOUSAND/UL (ref 0.6–4.47)
LYMPHOCYTES # BLD AUTO: 1 % (ref 14–44)
MAGNESIUM SERPL-MCNC: 2 MG/DL (ref 1.6–2.6)
MCH RBC QN AUTO: 30.1 PG (ref 26.8–34.3)
MCHC RBC AUTO-ENTMCNC: 33.3 G/DL (ref 31.4–37.4)
MCV RBC AUTO: 90 FL (ref 82–98)
METAMYELOCYTES NFR BLD MANUAL: 1 % (ref 0–1)
MONOCYTES # BLD AUTO: 0.47 THOUSAND/UL (ref 0–1.22)
MONOCYTES NFR BLD: 2 % (ref 4–12)
MYELOCYTES NFR BLD MANUAL: 1 % (ref 0–1)
NEUTROPHILS # BLD MANUAL: 22.16 THOUSAND/UL (ref 1.85–7.62)
NEUTS BAND NFR BLD MANUAL: 3 % (ref 0–8)
NEUTS SEG NFR BLD AUTO: 91 % (ref 43–75)
NRBC BLD AUTO-RTO: 0 /100 WBCS
P AXIS: 44 DEGREES
PHOSPHATE SERPL-MCNC: 3.7 MG/DL (ref 2.7–4.5)
PLATELET # BLD AUTO: 711 THOUSANDS/UL (ref 149–390)
PLATELET BLD QL SMEAR: ABNORMAL
PMV BLD AUTO: 9.2 FL (ref 8.9–12.7)
POLYCHROMASIA BLD QL SMEAR: PRESENT
POTASSIUM SERPL-SCNC: 3.4 MMOL/L (ref 3.5–5.3)
PR INTERVAL: 129 MS
QRS AXIS: 8 DEGREES
QRSD INTERVAL: 79 MS
QT INTERVAL: 317 MS
QTC INTERVAL: 405 MS
RBC # BLD AUTO: 2.99 MILLION/UL (ref 3.81–5.12)
RBC MORPH BLD: PRESENT
SODIUM SERPL-SCNC: 135 MMOL/L (ref 136–145)
T WAVE AXIS: 13 DEGREES
VENTRICULAR RATE: 98 BPM
WBC # BLD AUTO: 23.57 THOUSAND/UL (ref 4.31–10.16)

## 2018-01-31 PROCEDURE — 99153 MOD SED SAME PHYS/QHP EA: CPT

## 2018-01-31 PROCEDURE — C1729 CATH, DRAINAGE: HCPCS

## 2018-01-31 PROCEDURE — C1769 GUIDE WIRE: HCPCS

## 2018-01-31 PROCEDURE — 74340 X-RAY GUIDE FOR GI TUBE: CPT | Performed by: RADIOLOGY

## 2018-01-31 PROCEDURE — C9113 INJ PANTOPRAZOLE SODIUM, VIA: HCPCS | Performed by: NURSE PRACTITIONER

## 2018-01-31 PROCEDURE — 0W9930Z DRAINAGE OF RIGHT PLEURAL CAVITY WITH DRAINAGE DEVICE, PERCUTANEOUS APPROACH: ICD-10-PCS | Performed by: RADIOLOGY

## 2018-01-31 PROCEDURE — 99152 MOD SED SAME PHYS/QHP 5/>YRS: CPT | Performed by: RADIOLOGY

## 2018-01-31 PROCEDURE — 32557 INSERT CATH PLEURA W/ IMAGE: CPT | Performed by: RADIOLOGY

## 2018-01-31 PROCEDURE — 87205 SMEAR GRAM STAIN: CPT | Performed by: RADIOLOGY

## 2018-01-31 PROCEDURE — 84100 ASSAY OF PHOSPHORUS: CPT | Performed by: PHYSICIAN ASSISTANT

## 2018-01-31 PROCEDURE — 99231 SBSQ HOSP IP/OBS SF/LOW 25: CPT | Performed by: SURGERY

## 2018-01-31 PROCEDURE — 82948 REAGENT STRIP/BLOOD GLUCOSE: CPT

## 2018-01-31 PROCEDURE — 49406 IMAGE CATH FLUID PERI/RETRO: CPT | Performed by: RADIOLOGY

## 2018-01-31 PROCEDURE — 0DH93UZ INSERTION OF FEEDING DEVICE INTO DUODENUM, PERCUTANEOUS APPROACH: ICD-10-PCS | Performed by: RADIOLOGY

## 2018-01-31 PROCEDURE — 99024 POSTOP FOLLOW-UP VISIT: CPT | Performed by: SURGERY

## 2018-01-31 PROCEDURE — 87147 CULTURE TYPE IMMUNOLOGIC: CPT | Performed by: RADIOLOGY

## 2018-01-31 PROCEDURE — 99152 MOD SED SAME PHYS/QHP 5/>YRS: CPT

## 2018-01-31 PROCEDURE — 87077 CULTURE AEROBIC IDENTIFY: CPT | Performed by: RADIOLOGY

## 2018-01-31 PROCEDURE — 32557 INSERT CATH PLEURA W/ IMAGE: CPT

## 2018-01-31 PROCEDURE — 99233 SBSQ HOSP IP/OBS HIGH 50: CPT | Performed by: EMERGENCY MEDICINE

## 2018-01-31 PROCEDURE — 87070 CULTURE OTHR SPECIMN AEROBIC: CPT | Performed by: RADIOLOGY

## 2018-01-31 PROCEDURE — 80048 BASIC METABOLIC PNL TOTAL CA: CPT | Performed by: PHYSICIAN ASSISTANT

## 2018-01-31 PROCEDURE — 85027 COMPLETE CBC AUTOMATED: CPT | Performed by: PHYSICIAN ASSISTANT

## 2018-01-31 PROCEDURE — 83735 ASSAY OF MAGNESIUM: CPT | Performed by: PHYSICIAN ASSISTANT

## 2018-01-31 PROCEDURE — 49406 IMAGE CATH FLUID PERI/RETRO: CPT

## 2018-01-31 PROCEDURE — 85007 BL SMEAR W/DIFF WBC COUNT: CPT | Performed by: PHYSICIAN ASSISTANT

## 2018-01-31 PROCEDURE — 93005 ELECTROCARDIOGRAM TRACING: CPT | Performed by: EMERGENCY MEDICINE

## 2018-01-31 PROCEDURE — 44500 INTRO GASTROINTESTINAL TUBE: CPT | Performed by: RADIOLOGY

## 2018-01-31 PROCEDURE — 93010 ELECTROCARDIOGRAM REPORT: CPT | Performed by: INTERNAL MEDICINE

## 2018-01-31 PROCEDURE — 99233 SBSQ HOSP IP/OBS HIGH 50: CPT | Performed by: INTERNAL MEDICINE

## 2018-01-31 PROCEDURE — 43752 NASAL/OROGASTRIC W/TUBE PLMT: CPT

## 2018-01-31 PROCEDURE — 87186 SC STD MICRODIL/AGAR DIL: CPT | Performed by: RADIOLOGY

## 2018-01-31 PROCEDURE — 99024 POSTOP FOLLOW-UP VISIT: CPT | Performed by: PHYSICIAN ASSISTANT

## 2018-01-31 PROCEDURE — 0W9B30Z DRAINAGE OF LEFT PLEURAL CAVITY WITH DRAINAGE DEVICE, PERCUTANEOUS APPROACH: ICD-10-PCS | Performed by: RADIOLOGY

## 2018-01-31 PROCEDURE — 99232 SBSQ HOSP IP/OBS MODERATE 35: CPT | Performed by: INTERNAL MEDICINE

## 2018-01-31 RX ORDER — BISACODYL 10 MG
10 SUPPOSITORY, RECTAL RECTAL DAILY PRN
Status: DISCONTINUED | OUTPATIENT
Start: 2018-01-31 | End: 2018-02-01

## 2018-01-31 RX ORDER — POTASSIUM CHLORIDE 29.8 MG/ML
40 INJECTION INTRAVENOUS ONCE
Status: COMPLETED | OUTPATIENT
Start: 2018-01-31 | End: 2018-01-31

## 2018-01-31 RX ORDER — FENTANYL CITRATE 50 UG/ML
INJECTION, SOLUTION INTRAMUSCULAR; INTRAVENOUS CODE/TRAUMA/SEDATION MEDICATION
Status: COMPLETED | OUTPATIENT
Start: 2018-01-31 | End: 2018-01-31

## 2018-01-31 RX ORDER — MIDAZOLAM HYDROCHLORIDE 1 MG/ML
INJECTION INTRAMUSCULAR; INTRAVENOUS CODE/TRAUMA/SEDATION MEDICATION
Status: COMPLETED | OUTPATIENT
Start: 2018-01-31 | End: 2018-01-31

## 2018-01-31 RX ORDER — PANTOPRAZOLE SODIUM 40 MG/1
40 INJECTION, POWDER, FOR SOLUTION INTRAVENOUS
Status: DISCONTINUED | OUTPATIENT
Start: 2018-01-31 | End: 2018-03-09 | Stop reason: HOSPADM

## 2018-01-31 RX ADMIN — Medication 3 G: at 04:03

## 2018-01-31 RX ADMIN — Medication 3 G: at 22:23

## 2018-01-31 RX ADMIN — HYDROMORPHONE HYDROCHLORIDE 1 MG: 1 INJECTION, SOLUTION INTRAMUSCULAR; INTRAVENOUS; SUBCUTANEOUS at 15:14

## 2018-01-31 RX ADMIN — FENTANYL CITRATE 50 MCG: 50 INJECTION INTRAMUSCULAR; INTRAVENOUS at 08:41

## 2018-01-31 RX ADMIN — HEPARIN SODIUM 5000 UNITS: 5000 INJECTION, SOLUTION INTRAVENOUS; SUBCUTANEOUS at 22:23

## 2018-01-31 RX ADMIN — MIDAZOLAM 2 MG: 1 INJECTION INTRAMUSCULAR; INTRAVENOUS at 08:20

## 2018-01-31 RX ADMIN — MIDAZOLAM 1 MG: 1 INJECTION INTRAMUSCULAR; INTRAVENOUS at 08:41

## 2018-01-31 RX ADMIN — FENTANYL CITRATE 50 MCG: 50 INJECTION INTRAMUSCULAR; INTRAVENOUS at 09:34

## 2018-01-31 RX ADMIN — PANTOPRAZOLE SODIUM 40 MG: 40 TABLET, DELAYED RELEASE ORAL at 05:52

## 2018-01-31 RX ADMIN — POTASSIUM CHLORIDE 40 MEQ: 400 INJECTION, SOLUTION INTRAVENOUS at 10:35

## 2018-01-31 RX ADMIN — HYDROMORPHONE HYDROCHLORIDE 1 MG: 1 INJECTION, SOLUTION INTRAMUSCULAR; INTRAVENOUS; SUBCUTANEOUS at 10:35

## 2018-01-31 RX ADMIN — THIAMINE HYDROCHLORIDE: 100 INJECTION, SOLUTION INTRAMUSCULAR; INTRAVENOUS at 22:00

## 2018-01-31 RX ADMIN — HYDROMORPHONE HYDROCHLORIDE 1 MG: 1 INJECTION, SOLUTION INTRAMUSCULAR; INTRAVENOUS; SUBCUTANEOUS at 01:50

## 2018-01-31 RX ADMIN — Medication 3 G: at 15:35

## 2018-01-31 RX ADMIN — HYDROMORPHONE HYDROCHLORIDE 0.5 MG: 1 INJECTION, SOLUTION INTRAMUSCULAR; INTRAVENOUS; SUBCUTANEOUS at 22:22

## 2018-01-31 RX ADMIN — Medication 3 G: at 10:42

## 2018-01-31 RX ADMIN — FENTANYL CITRATE 50 MCG: 50 INJECTION INTRAMUSCULAR; INTRAVENOUS at 09:16

## 2018-01-31 RX ADMIN — HYDROMORPHONE HYDROCHLORIDE 1 MG: 1 INJECTION, SOLUTION INTRAMUSCULAR; INTRAVENOUS; SUBCUTANEOUS at 05:52

## 2018-01-31 RX ADMIN — PANTOPRAZOLE SODIUM 40 MG: 40 INJECTION, POWDER, FOR SOLUTION INTRAVENOUS at 13:27

## 2018-01-31 RX ADMIN — HYDROMORPHONE HYDROCHLORIDE 0.5 MG: 1 INJECTION, SOLUTION INTRAMUSCULAR; INTRAVENOUS; SUBCUTANEOUS at 13:04

## 2018-01-31 RX ADMIN — INSULIN LISPRO 1 UNITS: 100 INJECTION, SOLUTION INTRAVENOUS; SUBCUTANEOUS at 05:59

## 2018-01-31 RX ADMIN — HEPARIN SODIUM 5000 UNITS: 5000 INJECTION, SOLUTION INTRAVENOUS; SUBCUTANEOUS at 05:52

## 2018-01-31 RX ADMIN — SODIUM CHLORIDE 125 ML/HR: 0.9 INJECTION, SOLUTION INTRAVENOUS at 13:38

## 2018-01-31 RX ADMIN — HYDROMORPHONE HYDROCHLORIDE 0.5 MG: 1 INJECTION, SOLUTION INTRAMUSCULAR; INTRAVENOUS; SUBCUTANEOUS at 19:20

## 2018-01-31 RX ADMIN — FENTANYL CITRATE 50 MCG: 50 INJECTION INTRAMUSCULAR; INTRAVENOUS at 08:20

## 2018-01-31 RX ADMIN — SODIUM CHLORIDE 125 ML/HR: 0.9 INJECTION, SOLUTION INTRAVENOUS at 02:04

## 2018-01-31 RX ADMIN — IOHEXOL 10 ML: 300 INJECTION, SOLUTION INTRAVENOUS at 10:16

## 2018-01-31 RX ADMIN — MIDAZOLAM 1 MG: 1 INJECTION INTRAMUSCULAR; INTRAVENOUS at 09:16

## 2018-01-31 RX ADMIN — HEPARIN SODIUM 5000 UNITS: 5000 INJECTION, SOLUTION INTRAVENOUS; SUBCUTANEOUS at 15:28

## 2018-01-31 RX ADMIN — FLUCONAZOLE 400 MG: 2 INJECTION INTRAVENOUS at 15:28

## 2018-01-31 RX ADMIN — SODIUM CHLORIDE 125 ML/HR: 0.9 INJECTION, SOLUTION INTRAVENOUS at 22:02

## 2018-01-31 NOTE — PROGRESS NOTES
Spoke with team  Pt s/p IR perisplenic drain placement & B/L chest tubes for pleural effusions  She also had a Kaofed placed  I would not recommend giving anything via this tube as she as at high risk for refluxing contents & recent CT showed persistent GE junction leak  Continue TPN

## 2018-01-31 NOTE — CONSULTS
Consultation - Thoracic Surgery   Humble Saleem 36 y o  female MRN: [id number]  Unit/Bed#: ICU 10 Encounter: [id number]    Assessment/Plan     Assessment:  35 yo F s/p hiatal hernia repair 5/08 complicated by gastric leak/perforation, now s/p multiple exploratory laparotomies for washout  Now found with persistent leak despite stent placed by [facility]  Multiple abdominal collections present    Plan:  - GI consulted, do not believe another stent would be helpful in this situation  We will continue conservative management with TPN and hope that this leak closes  If it does not, she will require complex surgical repair  - IR consult for drainage abscess(es)  - Further attending rec's to follow    History of Present Illness     HPI:  Humble Saleem is a 36 y o  female who was transferred to [facility] from [facility] with gastric perforation  She has a h/o morbid obesity for which she underwent laparoscopic sleeve gastrectomy in 201 at [facility]  Also h/o  shunt for pseudotumor cerebri  She has done well with current BMI of 26 7  On 1/11 she underwent laparoscopic hiatal hernia repair  This was complicated by gastric leak, she underwent exploratory laparotomy at [facility] on 1/14, IR drain placement on 1/23  On 1/24 she was transferred to [facility] where her [address] placed a stent over the GE junction, and she was then transferred to the ICU  Since admission she had continued sepsis and worsening abdominal fluid collection  She has undergone another ex-lap with exchange of drains and also externalization of her  shunt by neurosurgery (1/25)  On 1/28 she underwent swallow study which initially was believed to show no leak, so she was started on liquid diet  Her WBC subsequently increased to 25 then 32 in the days following   She underwent CT scan today which showed persistent leak in the periesophageal region with multiple abdominal abscesses/collections       Consults    Review of Systems  As per hPI    Historical Information   History reviewed  No pertinent past medical history  Past Surgical History:   Procedure Laterality Date    LAPAROTOMY N/A 1/25/2018    Procedure: Exploratory Laparotomy, wash out,placement of drains, placement of NG feeding tube ;   Surgeon: Pauline Enriquez DO;  Location: [facility];  Service: General    NC REPLACEMENT/REVISION,CSF SHUNT Right 1/25/2018    Procedure: Externalization of right-sided SHUNT VENTRICULAR-PERITONEAL in anterior chest wall ribs two and three level  ;  Surgeon: Lashell Hermosillo MD;  Location: [facility];  Service: Neurosurgery     Social History   History   Alcohol Use No     History   Drug Use No     History   Smoking Status    Never Smoker   Smokeless Tobacco    Never Used     Family History: non-contributory    Meds/Allergies   all current active meds have been reviewed  Allergies   Allergen Reactions    Benadryl [Diphenhydramine] Swelling    Phenergan [Promethazine] Hives       Objective   First Vitals:   Blood Pressure: 130/69 (01/24/18 1823)  Pulse: 94 (01/24/18 1823)  Temperature: 98 6 °F (37 °C) (01/24/18 1823)  Temp Source: Rectal (01/24/18 1823)  Respirations: 17 (01/24/18 1823)  Height: 5' 4" (162 6 cm) (01/24/18 1823)  Weight - Scale: 70 9 kg (156 lb 4 9 oz) (01/24/18 1823)  SpO2: 100 % (01/24/18 1823)    Current Vitals:   Blood Pressure: 155/86 (01/30/18 1800)  Pulse: 104 (01/30/18 1800)  Temperature: 98 8 °F (37 1 °C) (01/30/18 1600)  Temp Source: Oral (01/30/18 1600)  Respirations: (!) 11 (01/30/18 1800)  Height: 5' 4" (162 6 cm) (01/24/18 1823)  Weight - Scale: 70 6 kg (155 lb 10 3 oz) (01/29/18 0559)  SpO2: 99 % (01/30/18 1800)      Intake/Output Summary (Last 24 hours) at 01/30/18 1902  Last data filed at 01/30/18 1501   Gross per 24 hour   Intake             2560 ml   Output             1387 ml   Net             1173 ml       Invasive Devices     Peripherally Inserted Central Catheter Line            PICC Line 01/21/18 Right Basilic 9 days          Drain            Ventriculostomy/Subdural Ventricular drainage catheter Right 5 days    Closed/Suction Drain Left LLQ Bulb 19 Fr  4 days    Closed/Suction Drain Left LUQ Bulb 19 Fr  4 days    Closed/Suction Drain Right RLQ Bulb 19 Fr  4 days    Closed/Suction Drain Right RUQ Accordion 19 Fr  4 days                Physical Exam    Physical Exam:   Gen: Resting comfortably  Cardio: RRR  Lungs: CTAB  Abd: Soft, non distended, appropriately tender  ADENIKE x 4, serousanginous with some debris, lower drains b/l more murky  Staples c/d/i  Ext: Warm, dry  Vasc: Intact    Lab Results:   CBC:   Lab Results   Component Value Date    WBC 32 71 (HH) 01/30/2018    HGB 9 9 (L) 01/30/2018    HCT 29 3 (L) 01/30/2018    MCV 89 01/30/2018     (H) 01/30/2018    MCH 30 2 01/30/2018    MCHC 33 8 01/30/2018    RDW 14 8 01/30/2018    MPV 9 3 01/30/2018    NRBC 0 01/30/2018    NRBC 1 01/30/2018   , CMP:   Lab Results   Component Value Date     (L) 01/30/2018    K 3 9 01/30/2018    CL 95 (L) 01/30/2018    CO2 30 01/30/2018    ANIONGAP 8 01/30/2018    BUN 21 01/30/2018    CREATININE 0 37 (L) 01/30/2018    GLUCOSE 73 01/30/2018    CALCIUM 10 3 (H) 01/30/2018    EGFR 131 01/30/2018   , Coagulation: No results found for: PT, INR, APTT  Imaging: I have personally reviewed pertinent reports  FL barium swallow   Final Result by Josue Carey MD (01/28 1111)   Addendum 1 of 1 by Josue Carey MD (01/30 0098)   Addendum: In retrospect, on the last overhead lateral image there is    contrast material posterior to the mid aspect of the stent  Previously    there was was believed to be within the bowel  Based on CT examination of    1/30/2018 this likely represents a    site of leak posterior to the gastroesophageal junction  This was not    seen dynamically and likely represents a delayed leak  Final      1  Gastroesophageal stent identified without evidence for leak    Slow passage of contrast material through the stent  There is some persistent stasis of contrast material within the stent at the end of the exam    2   Reflux of contrast is noted to the upper esophagus  Workstation performed: [id number]         CT chest abdomen pelvis w contrast   Final Result by Urmila Zavala MD (01/30 1407)      There is extravasation of contrast in the upper abdomen in the periesophageal region with tracking of contrast in the perisplenic fluid collection and in the upper abdomen  Suggest persistent leak  This leak is better demonstrated on the barium swallow    performed on January 28, 2018       Multiple intra-abdominal fluid collections are decreasing in size      Bilateral large effusions with compressive atelectasis  I personally discussed this study with Dr Aldo Juarez   on 1/30/2018 2:03 PM                            Workstation performed: [id number]         XR chest portable   Final Result by Jamilah Vines MD (01/27 1953)      1  Tip of NG feeding tube in location of the duodenal bulb  2   Consolidation in the base of the left lower lobe  3   Small bilateral pleural effusions  Workstation performed: [id number]         XR abdomen 1 view kub   Final Result by Bisi Finley MD (01/27 8467)      Keofeed tube tip overlies the distal gastric region, appears in position  Workstation performed: [id number]         VAS upper limb venous duplex scan, unilateral/limited   Final Result by Katie Gamez MD (01/26 2217)      XR chest portable   Final Result by Re Wells DO (01/26 7675)      ET tube terminates just below the clavicular heads  Workstation performed: [id number]         XR abdomen 1 view kub   Final Result by Fernie Gruber MD (01/25 2042)      Fluoroscopic guidance for placement of feeding tube with tip in the 1st portion of the duodenum  Please refer to the separate procedure notes for additional details           Workstation performed: [id number]         CT chest abdomen pelvis w contrast   Final Result by Daniel Isabel MD (01/25 1159)      Extensive perihepatic subcapsular abscess which is in communication with a right paracolic gutter abscess  Drainage catheter within the left subhepatic space does not appear to communicate with this abscess  Large perisplenic abscess with mass effect on the spleen  Peripheral hypodensities within the spleen likely represent splenic infarcts  Infectious involvement less likely but not excluded  Large left paracolic gutter abscess  Mesenteric abscess as described  Pelvic abscess as described, pelvic drainage catheter is anterior to the abscess  Patient is status post placement of esophageal stent as well as gastric sleeve procedure  No evidence of extravasated oral contrast is identified on this exam       Dependent lower lobe atelectasis and small bilateral pleural effusions  Extensive subcutaneous edema suggesting anasarca  Soft tissue gas identified within the left axillary soft tissues, correlate for a potential iatrogenic etiology  If this does not exist, an infectious process related to a gas-forming organism is should be excluded  I personally discussed this study with Chace Galicia on 1/25/2018 11:58 AM          Workstation performed: [id number]         XR shunt series   Final Result by Daniel Isabel MD (01/25 1116)      Intact  shunt catheter  Workstation performed: [id number]         XR chest portable   Final Result by Daniel Isabel MD (01/25 1002)      No active disease  Workstation performed: [id number]         CT head wo contrast   Final Result by Claudio Hall MD (01/25 7788)      No acute intracranial abnormality  Right frontal approach catheter terminating in the region of the third ventricle  Ventricular system is decompressed  No evidence for interstitial edema or extra-axial fluid collections  Workstation performed: [id number]         IR consult    (Results Pending)   IR consult    (Results Pending)       EKG, Pathology, and Other Studies: I have personally reviewed pertinent reports  Counseling / Coordination of Care  Total floor / unit time spent today 35 minutes  Greater than 50% of total time was spent with the patient and / or family counseling and / or coordination of care  A description of the counseling / coordination of care: Elizabeth Garland

## 2018-01-31 NOTE — PROGRESS NOTES
Progress Note - General Surgery   Luis Mercadoman 36 y o  female MRN: [id number]  Unit/Bed#: ICU 10 Encounter: [id number]    Assessment:  35 yo F s/p hiatal hernia repair 3/65 complicated by gastric leak/perforation, now s/p multiple exploratory laparotomies for washout  Now found with persistent leak despite stent placed by [facility]  Multiple abdominal collections present    Plan:  NPO w/ TPN  Hope to allow leak to seal w/ bowel rest  Continue Unasyn per ID, Fluconazole  F/U IR consult for abscess drainage  F/U IR consult for thoracentesis  PRN pain control  Monitor ADENIKE drain outputs, milk drains regularly    Subjective/Objective   Chief Complaint: None    Subjective: No complaints, was able to get some sleep last night  Objective:     Blood pressure 153/84, pulse 96, temperature 98 5 °F (36 9 °C), temperature source Oral, resp  rate 12, height 5' 4" (1 626 m), weight 70 6 kg (155 lb 10 3 oz), SpO2 97 %, not currently breastfeeding  ,Body mass index is 26 72 kg/m²  Intake/Output Summary (Last 24 hours) at 01/31/18 4760  Last data filed at 01/31/18 0200   Gross per 24 hour   Intake          3790 55 ml   Output             1703 ml   Net          2087 55 ml       Invasive Devices     Peripherally Inserted Central Catheter Line            PICC Line 53/45/69 Right Basilic 10 days          Drain            Closed/Suction Drain Left LLQ Bulb 19 Fr  5 days    Closed/Suction Drain Left LUQ Bulb 19 Fr  5 days    Closed/Suction Drain Right RLQ Bulb 19 Fr  5 days    Closed/Suction Drain Right RUQ Accordion 19 Fr  5 days    Ventriculostomy/Subdural Ventricular drainage catheter Right 5 days                Physical Exam:   Gen: Sleeping, comfortable  Cardio: RRR  Lungs: CTAB  Abd: Soft, + distended vs edematous, incision c/d/i w/ staples  ADENIKE x 4   LLQ brownish liquid, others serous to serosanginous      Lab, Imaging and other studies:  CBC:   Lab Results   Component Value Date    WBC 23 57 (H) 01/31/2018    HGB 9 0 (L) 01/31/2018    HCT 27 0 (L) 01/31/2018    MCV 90 01/31/2018     (H) 01/31/2018    MCH 30 1 01/31/2018    MCHC 33 3 01/31/2018    RDW 15 5 (H) 01/31/2018    MPV 9 2 01/31/2018   , CMP:   Lab Results   Component Value Date     (L) 01/31/2018    K 3 4 (L) 01/31/2018    CL 96 (L) 01/31/2018    CO2 32 01/31/2018    ANIONGAP 7 01/31/2018    BUN 16 01/31/2018    CREATININE 0 31 (L) 01/31/2018    GLUCOSE 127 01/31/2018    CALCIUM 9 8 01/31/2018    EGFR 139 01/31/2018     VTE Pharmacologic Prophylaxis: Heparin  VTE Mechanical Prophylaxis: sequential compression device

## 2018-01-31 NOTE — BRIEF OP NOTE (RAD/CATH)
INTERVENTIONAL RADIOLOGY PROCEDURE NOTE    Preoperative diagnosis:  Bilateral pleural effusions    Postoperative diagnosis: Same    Procedure:  Bilateral chest tube placement    Surgeon: Chavez Dwyer MD     Assistant: None  No qualified resident was available  Blood loss:  Trace    Specimens:  Sent for culture from each side    Findings:  10 Welsh chest tubes placed bilaterally under ultrasound  Clear pleural fluid returned  Catheters were connected to atrium, and will be put to wall suction when the patient returns to the floor      Complications:  None immediate    Anesthesia: IV conscious sedation

## 2018-01-31 NOTE — SEDATION DOCUMENTATION
Post Pyloric "Ximena-fed" tube placed by Dr Hudson Kenny under fluoroscopic guidance  PLEASE NOTE THE WEIGHT/ TIP OF XIMENA-FED WAS CUT BEFORE INSERTION     OK TO USE FOR FEEDING PER DR Paulette Orlando

## 2018-01-31 NOTE — BRIEF OP NOTE (RAD/CATH)
INTERVENTIONAL RADIOLOGY PROCEDURE NOTE    Preoperative diagnosis:  Left upper quadrant abscess    Postoperative diagnosis: Same    Procedure:  Ultrasound-guided abscess drainage    Surgeon: Liza Ware MD     Assistant: None  No qualified resident was available  Blood loss:  Trace    Specimens:  Sent for culture    Findings:  12 Vietnamese drainage catheter placed under ultrasound into left upper quadrant collection  130 mL dark pus was removed  The catheter was connected to a ADENIKE bulb      Complications:  None immediate    Anesthesia: IV conscious sedation

## 2018-01-31 NOTE — PHYSICAL THERAPY NOTE
Physical Therapy Cancellation Note      PT CONSULT RECEIVED  PATIENT OFF FLOOR THIS AM AT IR  PT WILL RE-ATTEMPT IN PM IF TIME AND APPROPRIATE AND CONTINUE TO FOLLOW       Dominick Donaldson PT

## 2018-01-31 NOTE — PROGRESS NOTES
Progress Note - Bariatric Surgery   Bertell Gilford 36 y o  female MRN: [id number]  Unit/Bed#: ICU 10 Encounter: [id number]    Assessment:  37 yo F s/p hiatal hernia repair 3/98 complicated by gastric leak/perforation, now s/p laparoscopic washout & ?Jenna Like repair (per patient as no document in chart) which subsequently failed and leaked, s/p esophagogastric endoluminal stenting by GI, s/p ex lap washout by general surgical service at [facility]  Likely refluxing around distal portion of stent as contrast extrav on UGI and CTCAP with large fluid collection in perisplenic location  Has had b/l chest tubes, LUQ drain, and Keofeed placed by IR this AM  Leukocytosis mildly improved today  I discussed the findings as well as the course and possible future course with the patient's daughter and uncle at the bedside today  They understood and all questions were asked  I explained to all that this will be an extended and likely difficult course in order to heal this gastric injury  Plan:  - cont NPO/IVF  - hold tube feeds for now; concern if any malposition, migration, or prepyloric positioning that feeds or meds will continue to drain from the gastrotomy in this acute period  - continue TPN per primary  - cont IV antibiotics per ID  - cont VTE ppx      Subjective/Objective   Chief Complaint: sore from chest tubes    Subjective: -n/v, mild reflux symptoms, ok overnight    Objective:     Vitals: Blood pressure (!) 178/99, pulse (!) 106, temperature 98 5 °F (36 9 °C), temperature source Oral, resp  rate 16, height 5' 4" (1 626 m), weight 70 6 kg (155 lb 10 3 oz), SpO2 100 %, not currently breastfeeding  ,Body mass index is 26 72 kg/m²        Intake/Output Summary (Last 24 hours) at 01/31/18 1152  Last data filed at 01/31/18 1115   Gross per 24 hour   Intake          4438 28 ml   Output             2341 ml   Net          2097 28 ml       Invasive Devices     Peripherally Inserted Central Catheter Line            PICC Line 46/35/64 Right Basilic 10 days          Drain            Closed/Suction Drain Left LLQ Bulb 19 Fr  5 days    Closed/Suction Drain Left LUQ Bulb 19 Fr  5 days    Closed/Suction Drain Right RLQ Bulb 19 Fr  5 days    Closed/Suction Drain Right RUQ Accordion 19 Fr  5 days    Ventriculostomy/Subdural Ventricular drainage catheter Right 5 days    Chest Tube 1 Right Pleural 10 Fr  less than 1 day    Chest Tube 2 Left Pleural 10 Fr  less than 1 day    Closed/Suction Drain Left Abdomen Bulb 12 Fr  less than 1 day    NG/OG/Enteral Tube Enteral Feeding Tube Right nares less than 1 day                Physical Exam:   Gen: alert, NAD  HEENT: EOMI  Abd: soft, appropriately tender, LUQ ADENIKE seropurulent, lower quadrant drains with clear murky material, remaining JPs serosang; -r/g  Ext: full ROM, edematous    Lab, Imaging and other studies:   I have personally reviewed pertinent labs    CBC:   Lab Results   Component Value Date    WBC 23 57 (H) 01/31/2018    HGB 9 0 (L) 01/31/2018    HCT 27 0 (L) 01/31/2018    MCV 90 01/31/2018     (H) 01/31/2018    MCH 30 1 01/31/2018    MCHC 33 3 01/31/2018    RDW 15 5 (H) 01/31/2018    MPV 9 2 01/31/2018    NRBC 0 01/31/2018     CMP:   Lab Results   Component Value Date     (L) 01/31/2018    CL 96 (L) 01/31/2018    CO2 32 01/31/2018    ANIONGAP 7 01/31/2018    BUN 16 01/31/2018    CREATININE 0 31 (L) 01/31/2018    GLUCOSE 127 01/31/2018    CALCIUM 9 8 01/31/2018    EGFR 139 01/31/2018     Lactic Acid: No results found for: LACTICACID  VTE Pharmacologic Prophylaxis: Sequential compression device (Venodyne)  and Heparin  VTE Mechanical Prophylaxis: sequential compression device

## 2018-01-31 NOTE — MEDICAL STUDENT
Progress Note - Critical Care   Rosaura Sinclair 36 y o  female MRN: [id number]  Unit/Bed#: ICU 10 Encounter: [id number]    HPI/24hr events: Pt was found to have persistent drainage at the GE junction upon CT abd/pelvis yesterday  She was made NPO and started on TPN with plans to manage conservatively and for surgical correction if this is unsuccessful  IR consulted to drain remaining abdominal abscesses  The pt remains afebrile and is clinically stable  She had some low O2 saturations yesterday and is currently on 1 5 L NC  Her abd pain is improving, however she reports brief, intermittent, sharp, chest and or neck pain that is not affected by inspiration  Neuro:   1  Day 5 S/p  shunt externalization   -per neurosurgery maintain external ventricular drain at 8 mm  mercury, and will clamp the externalized drain and monitor pt's  neuro exam     -CSF sample taken yesterday:     -clear appearance, 0 WBC, glucose: 56, RBC: 0,    total protein: 25, stat gram stain: no polys or bacteria      -f/u gram stain     -opthalmology consult ordered   2  Analgesia/sedation:   -prn: dilaudid   5 mg IV q 3H, dilaudid 1 mg IV q3H, oxycodone 10 mg Q4H, oxycodone PO 15 mg q 4H   -scheduled: home gabapentin 300 mg PO hs and notriptyline 10 charley PO BID                CV: hemodynamically stable, not requiring pressors   1  3/4 systolic murmur: ECHO EF 65 % with no valvular regurg/stenosis   2  Maintain MAP > 65   2  Access:   -PICC line Right Basilic for 10 days                  Lun   Bilateral moderate to large right and left pleural effusions associated with compressive atelectasis in both lungs  -seen on CT abd and pelvis   -pulse ox readings of 88 % and 89 % yesterday afternoon   -was placed on NC --> now SpO2 of 98 % on 1 5 L : wean nasal cannula as tolerated   -negative for pleuritic chest pain or subjective shortness of breath    -encourage incentive spirometry  -pulmonary toilet                 GI:  2  Acute peritonitis:  -S/p EGD 1/24 with stent placement  -s/p ex-lap 1/25 for abdominal abscesses and fluid collections : 4 ADENIKE tubes put in place; Willena Ill NG tube was put in place   -1/28: Barium swallow negative for leak --> advanced to bariatric full liquid diet on 1/29  -1/31: CT of abd/pelvis showed leak at the GE junction    -GI consulted: do not believe another stent would be helpful because  the original stent was already shown to be in place upon imaging    -NPO with TPN     -per surgery: will continue with conservative management --> if leak  does not close, will require complex surgical repair   -IR consulted for drainage of abscesses   -stress ulcer prophylaxis: IV protonix 40 mg IV q12 h   -Bowel regimen: senna-docusate sodium 1 tablet PO HS, first dose given last night; miralax 17 g PRN                FEN:  1  Electrolytes: Replete electrolytes as needed (goal: K>4, Mag > 2 and Phos > 3) : K currently 3 4, consider repletion   -flintstones multivitamin, vitamin B1 100 mg daily   2  Nutrition:   -NPO; sips with meds   -TPN started yesterday @ 48 6 mL/hr                 :   -Urine output: 1 45 yesterday --> continue monitoring urine output   -Bun: 16, Cr:  31 --> continue trending BUN and Cr  -strict I and O                ID: WBC: 23 57 from 32 71 yesterday, remains afebrile   1  Acute peritonitis :  -continue current abx regimen per ID: unasyn 3 g 6 h; fluconazole 400 mg IV q 24 H yesterday secondary to persistent GI leak   -1/25 operative cx: enterococcus   -f/u rest of operative cultures     -trend WBCs, temps and hemodynamics   2   Possible infected  shunt  -CSF cx from 1/25 negative   -will likely tx with IV abx x 4-6 weeks                  Heme:   -Hgb 9 from 9 9: trend   -subQ heparin restarted on 1/27                Endo:  -glycemic control : insulin lispro 100 units/mL q6H                            Msk/Skin:  -frequent turning and pressure off-loading  -local wound care as needed Disposition: continue ICU care      Consultants:General surgery, neurosurgery, ID      VTE PPx:        Pharmacologic: Sub-q heparin       Mechanical: sequential compression device       Vitals:    18 0200 18 0300 18 0400 18 0604   BP: 151/86 152/80 153/84 160/77   Pulse: 94 98 96 92   Resp: 20 14 12 (!) 25   Temp:   98 5 °F (36 9 °C)    TempSrc:   Oral    SpO2: 98% 97% 97% 98%   Weight:       Height:         Arterial Line BP: 166/82  Arterial Line MAP (mmHg): 112 mmHg    Temperature:   Temp (24hrs), Av 5 °F (36 9 °C), Min:97 8 °F (36 6 °C), Max:98 8 °F (37 1 °C)    Current: Temperature: 98 5 °F (36 9 °C)    Weights:   IBW: 54 7 kg    Body mass index is 26 72 kg/m²  Weight (last 2 days)     Date/Time   Weight    18 0559  70 6 (155 65)              Hemodynamic Monitoring:  Non-Invasive/Invasive Ventilation Settings:  Respiratory    Lab Data (Last 4 hours)    None         O2/Vent Data (Last 4 hours)    None              No results found for: PHART, BXQ3KQG, PO2ART, BXK0RTM, W2FULFKL, BEART, SOURCE      Intake and Outputs:  I/O        0701 -  0700  07 -  0700    I V  (mL/kg)  1135 (16)    IV Piggyback  100    Total Intake(mL/kg)  1235 (17 4)    Urine (mL/kg/hr)  900    Drains  760    Total Output   1660    Net   -425                Nutrition:        Diet Orders            Start     Ordered    18 1339  Diet NPO; Sips with meds  Diet effective now     Question Answer Comment   Diet Type NPO    NPO Except: Sips with meds    RD to adjust diet per protocol?  No        18 1338            Labs:     Results from last 7 days  Lab Units 18  0436 18  0441 18  0501   WBC Thousand/uL 23 57* 32 71* 25 53*   HEMOGLOBIN g/dL 9 0* 9 9* 10 1*   HEMATOCRIT % 27 0* 29 3* 30 9*   PLATELETS Thousands/uL 711* 795* 753*   MONO PCT MAN % 2* 3* 4        Results from last 7 days  Lab Units 18  0436 18  0441 18  0501  18  2244   SODIUM mmol/L 135* 133* 136  < > 137   POTASSIUM mmol/L 3 4* 3 9 4 1  < > 4 9   CHLORIDE mmol/L 96* 95* 99*  < > 102   CO2 mmol/L 32 30 31  < > 31   BUN mg/dL 16 21 22  < > 21   CREATININE mg/dL 0 31* 0 37* 0 34*  < > 0 37*   CALCIUM mg/dL 9 8 10 3* 9 9  < > 10 2*   TOTAL PROTEIN g/dL  --   --   --   --  5 3*   BILIRUBIN TOTAL mg/dL  --   --   --   --  0 91   ALK PHOS U/L  --   --   --   --  177*   ALT U/L  --   --   --   --  61   AST U/L  --   --   --   --  28   GLUCOSE RANDOM mg/dL 127 73 78  < > 109   GLUCOSE, ISTAT   --   --   --   < >  --    < > = values in this interval not displayed  Results from last 7 days  Lab Units 01/31/18  0436 01/30/18  0441 01/28/18  0507   MAGNESIUM mg/dL 2 0 1 8 1 7       Results from last 7 days  Lab Units 01/31/18  0436 01/30/18  0441 01/28/18  0507   PHOSPHORUS mg/dL 3 7 3 7 3 9        Results from last 7 days  Lab Units 01/24/18  1954   INR  1 26*   PTT seconds 28       Results from last 7 days  Lab Units 01/25/18  2118   LACTIC ACID mmol/L 1 3     No results found for: TROPONINI      Micro:  Lab Results   Component Value Date    WOUNDCULT 2+ Growth of Enterococcus faecalis (A) 01/25/2018       Allergies:    Allergies   Allergen Reactions    Benadryl [Diphenhydramine] Swelling    Phenergan [Promethazine] Hives       Medications:   Scheduled Meds:    Current Facility-Administered Medications:  Adult TPN (STANDARD BASE/STANDARD ELECTROLYTE)  Intravenous Continuous José Burn Last Rate: 48 6 mL/hr at 01/30/18 2120   ampicillin-sulbactam 3 g Intravenous Q6H Byron Cornejo MD Last Rate: Stopped (01/31/18 0433)   fluconazole 400 mg Intravenous Q24H ABNER Thacker Last Rate: 400 mg (01/30/18 1733)   gabapentin 300 mg Oral HS Catherine Trotter PA-C    heparin (porcine) 5,000 Units Subcutaneous Q8H [address] ABNER Thacker    HYDROmorphone 0 5 mg Intravenous Q3H PRN Catherine Trotter PA-C    HYDROmorphone 1 mg Intravenous Q3H PRN Mejia Larson MD    insulin lispro 1-5 Units Subcutaneous Q6H Meliton Mak MD    iohexol 50 mL Oral 90 min pre-procedure Vahe Brian MD    multivitamin 1 tablet Oral Daily Shayne Arteaga PA-C    nortriptyline 10 mg Oral BID Shayne Arteaga PA-C    oxyCODONE 15 mg Oral Q4H PRN Staci Carbone PA-C    Or        oxyCODONE 10 mg Oral Q4H PRN Staci Carbone PA-C    pantoprazole 40 mg Oral Early Morning Shayne Arteaga PA-C    polyethylene glycol 17 g Oral Daily PRN Shayne Arteaga PA-C    potassium chloride 40 mEq Intravenous Once Staci Carbone PA-C    senna-docusate sodium 1 tablet Oral HS ABNER Tse    sodium chloride 125 mL/hr Intravenous Continuous Staci Carbone PA-C Last Rate: 125 mL/hr (01/31/18 0204)   thiamine 100 mg Oral Daily Shayne Arteaga PA-C      Continuous Infusions:    Adult TPN (STANDARD BASE/STANDARD ELECTROLYTE)  Last Rate: 48 6 mL/hr at 01/30/18 2120   sodium chloride 125 mL/hr Last Rate: 125 mL/hr (01/31/18 0204)     PRN Meds:    HYDROmorphone 0 5 mg Q3H PRN   HYDROmorphone 1 mg Q3H PRN   oxyCODONE 15 mg Q4H PRN   Or     oxyCODONE 10 mg Q4H PRN   polyethylene glycol 17 g Daily PRN       Invasive lines and devices: Invasive Devices     Peripherally Inserted Central Catheter Line            PICC Line 92/66/10 Right Basilic 10 days          Drain            Closed/Suction Drain Left LLQ Bulb 19 Fr  5 days    Closed/Suction Drain Left LUQ Bulb 19 Fr  5 days    Closed/Suction Drain Right RLQ Bulb 19 Fr  5 days    Closed/Suction Drain Right RUQ Accordion 19 Fr  5 days    Ventriculostomy/Subdural Ventricular drainage catheter Right 5 days                      Code Status: Level 1 - Full Code     Portions of the record may have been created with voice recognition software  Occasional wrong word or "sound a like" substitutions may have occurred due to the inherent limitations of voice recognition software  Read the chart carefully and recognize, using context, where substitutions have occurred  Yomi Tomlin

## 2018-01-31 NOTE — BRIEF OP NOTE (RAD/CATH)
INTERVENTIONAL RADIOLOGY PROCEDURE NOTE    Preoperative diagnosis:  Postoperative leak after gastric sleeve covered stent in place  Postoperative diagnosis: Same    Procedure:  Aneita Prima placement under fluoroscopy    Surgeon: Joyce West MD     Assistant: None  No qualified resident was available  Blood loss:  Trace    Specimens:  None    Findings:  Very difficult placement due to stent position, and unable to pass the tube beyond the duodenal bulb with the weight in place  The tube weight was cut off, and the tube was advanced into the 3rd portion of the duodenum over a stiff Glidewire      Complications:  None immediate    Anesthesia: IV conscious sedation

## 2018-01-31 NOTE — PROGRESS NOTES
50-year-old female status post gastric sleeve now presenting with undrained perisplenic collection containing air for percutaneous drainage, and new bilateral pleural effusions for chest tube placement  Patient also requires Sean Matthews placement, and has a covered stent in place within her distal esophagus and stomach, complicating placement    The history and physical were reviewed, along with progress notes, and the patient was examined  There are no changes since it was written

## 2018-01-31 NOTE — PROGRESS NOTES
Progress Note - Neurosurgery   Gilles Cleaning 36 y o  female MRN: [id number]  Unit/Bed#: ICU 10 Encounter: [id number]    Assessment:  1  Procedure day 6 externalization of right-sided ventriculoperitoneal shunt  2  History of ventriculoperitoneal shunt for idiopathic intracranial hypertension (originally placed May 30, 2017)  3  Left upper extremity occlusive thrombophlebitis cephalic vein  4  Peritonitis   5  Gastric perforation status post repair    Plan:  48-year-old female transfer [facility] for esophageal stent status post upper gastric injury during laparoscopic paraesophageal hernia repair  Patient developed sepsis and required multiple operative procedures  Upon transfer to [facility], she underwent a esophageal stent and externalization ventriculoperitoneal shunt  · Exam reveals diffuse weakness  Drowsy and slow to answer questions  Following commands  Dressing intact  · Imaging reviewed personally and by attending  Final results as below  · Shunt series:  Intact catheter with Codman Hakim shunt set to 70mm H2O  · Externalized shunt clamped  Monitor exam    · CSF sent - Glucose 56, RBC 0, Protein 25, WBC 0, gram stain no bacteria, culture preliminary no growth  · Considering removal of shunt if tolerates clamp  · Recommend ophthalmology evaluation  Pending   · Pain control per primary team   · Mobilize daily  · SSC following -  ADENIKE drains x5  Trend white count 23 57 (32 71)  · Repeat CT scan CAP w 1/30/18 with persistent leak  · Status post IR buzz splenic drain placement bilateral chest tubes for pleural effusion along with Keofed placement  GI recommend defer use of Keofed and recommend continue TPN  · Infectious disease following - continue on IV Unasyn anticipated 4-6 weeks  · DVT PPX:  Heparin and SCDs on during exam   · Will continue to follow      Subjective/Objective   Chief Complaint: "I'm sore"/follow-up externalized shunt    Subjective:  Patient complaining of diffuse soreness  Recently given Dilaudid  No new weakness of persistent diffuse weakness  No new vision or speech changes  Denies headache  Objective:  Lying in bed  NAD  I/O       01/29 0701 - 01/30 0700 01/30 0701 - 01/31 0700 01/31 0701 - 02/01 0700    P  O  700 1990     I V  (mL/kg) 561 7 (8) 1869 6 (26 5) 923 3 (13 1)    IV Piggyback 400 350 300    TPN  421 2 370 2    Total Intake(mL/kg) 1661 7 (23 5) 4630 8 (65 6) 1593 5 (22 6)    Urine (mL/kg/hr) 1320 (0 8) 1450 (0 9) 863 (1 3)    Drains 276 (0 2) 353 (0 2) 270 (0 4)    Total Output 1596 1803 1133    Net +65 7 +2827 8 +460 5           Unmeasured Urine Occurrence  1 x           Invasive Devices     Peripherally Inserted Central Catheter Line            PICC Line 64/89/23 Right Basilic 10 days          Drain            Closed/Suction Drain Left LLQ Bulb 19 Fr  5 days    Closed/Suction Drain Left LUQ Bulb 19 Fr  5 days    Closed/Suction Drain Right RLQ Bulb 19 Fr  5 days    Closed/Suction Drain Right RUQ Accordion 19 Fr  5 days    Ventriculostomy/Subdural Ventricular drainage catheter Right 5 days    Chest Tube 1 Right Pleural 10 Fr  less than 1 day    Chest Tube 2 Left Pleural 10 Fr  less than 1 day    Closed/Suction Drain Left Abdomen Bulb 12 Fr  less than 1 day    NG/OG/Enteral Tube Enteral Feeding Tube Right nares less than 1 day                Physical Exam:  Vitals: Blood pressure 165/92, pulse 102, temperature 97 7 °F (36 5 °C), temperature source Oral, resp  rate (!) 25, height 5' 4" (1 626 m), weight 70 6 kg (155 lb 10 3 oz), SpO2 100 %, not currently breastfeeding  ,Body mass index is 26 72 kg/m²      General appearance:  Drowsy, appears stated age, cooperative and no distress  Head: Normocephalic, without obvious abnormality, atraumatic  Eyes: PERRL  Neck: supple, symmetrical, trachea midline   Lungs: non labored breathing  Heart:  Tachycardia  Neurologic:   Mental status:  Drowsy, monosyllabic responses  Cranial nerves: grossly intact (Cranial nerves II-XII)  Sensory: normal to LT  Motor: moving all extremities   Reflexes: no clonus    Lab Results:    Results from last 7 days  Lab Units 01/31/18  0436 01/30/18 0441 01/29/18  0501   WBC Thousand/uL 23 57* 32 71* 25 53*   HEMOGLOBIN g/dL 9 0* 9 9* 10 1*   HEMATOCRIT % 27 0* 29 3* 30 9*   PLATELETS Thousands/uL 711* 795* 753*   MONO PCT MAN % 2* 3* 4       Results from last 7 days  Lab Units 01/31/18  0436 01/30/18  0441 01/29/18  0501  01/24/18  2244   SODIUM mmol/L 135* 133* 136  < > 137   POTASSIUM mmol/L 3 4* 3 9 4 1  < > 4 9   CHLORIDE mmol/L 96* 95* 99*  < > 102   CO2 mmol/L 32 30 31  < > 31   BUN mg/dL 16 21 22  < > 21   CREATININE mg/dL 0 31* 0 37* 0 34*  < > 0 37*   CALCIUM mg/dL 9 8 10 3* 9 9  < > 10 2*   TOTAL PROTEIN g/dL  --   --   --   --  5 3*   BILIRUBIN TOTAL mg/dL  --   --   --   --  0 91   ALK PHOS U/L  --   --   --   --  177*   ALT U/L  --   --   --   --  61   AST U/L  --   --   --   --  28   GLUCOSE RANDOM mg/dL 127 73 78  < > 109   GLUCOSE, ISTAT   --   --   --   < >  --    < > = values in this interval not displayed  Results from last 7 days  Lab Units 01/31/18  0436 01/30/18  0441 01/28/18  0507   MAGNESIUM mg/dL 2 0 1 8 1 7       Results from last 7 days  Lab Units 01/31/18  0436 01/30/18  0441 01/28/18  0507   PHOSPHORUS mg/dL 3 7 3 7 3 9       Results from last 7 days  Lab Units 01/24/18  1954   INR  1 26*   PTT seconds 28     No results found for: TROPONINT  ABG:  Lab Results   Component Value Date    PHART 7 484 (H) 01/25/2018    LJJ1BKM 33 6 (L) 01/25/2018    PO2ART 165 1 (H) 01/25/2018    LHK7HRC 24 7 01/25/2018    BEART 1 6 01/25/2018    SOURCE Line, Arterial 01/25/2018       Imaging Studies: I have personally reviewed pertinent reports     and I have personally reviewed pertinent films in PACS    FL barium swallow   Final Result by Dianne Love MD (01/28 1111)   Addendum 1 of 1 by Dianne Love MD (01/30 1424)   Addendum: In retrospect, on the last overhead lateral image there is    contrast material posterior to the mid aspect of the stent  Previously    there was was believed to be within the bowel  Based on CT examination of    1/30/2018 this likely represents a    site of leak posterior to the gastroesophageal junction  This was not    seen dynamically and likely represents a delayed leak  Final      1  Gastroesophageal stent identified without evidence for leak  Slow passage of contrast material through the stent  There is some persistent stasis of contrast material within the stent at the end of the exam    2   Reflux of contrast is noted to the upper esophagus  Workstation performed: [id number]         CT chest abdomen pelvis w contrast   Final Result by Basilia Chau MD (01/30 4727)      There is extravasation of contrast in the upper abdomen in the periesophageal region with tracking of contrast in the perisplenic fluid collection and in the upper abdomen  Suggest persistent leak  This leak is better demonstrated on the barium swallow    performed on January 28, 2018       Multiple intra-abdominal fluid collections are decreasing in size      Bilateral large effusions with compressive atelectasis  I personally discussed this study with Dr Dillon Webb   on 1/30/2018 2:03 PM                            Workstation performed: [id number]         XR chest portable   Final Result by Carolina Sorto MD (01/27 1953)      1  Tip of NG feeding tube in location of the duodenal bulb  2   Consolidation in the base of the left lower lobe  3   Small bilateral pleural effusions  Workstation performed: [id number]         XR abdomen 1 view kub   Final Result by Nubia Carter MD (01/27 1867)      Keofeed tube tip overlies the distal gastric region, appears in position           Workstation performed: [id number]         VAS upper limb venous duplex scan, unilateral/limited   Final Result by Gibson Ornelas MD (01/26 2217)      XR chest portable Final Result by Octavio 6, DO (01/26 0078)      ET tube terminates just below the clavicular heads  Workstation performed: [id number]         XR abdomen 1 view kub   Final Result by Leslie Martinez MD (01/25 2042)      Fluoroscopic guidance for placement of feeding tube with tip in the 1st portion of the duodenum  Please refer to the separate procedure notes for additional details  Workstation performed: [id number]         CT chest abdomen pelvis w contrast   Final Result by Frederick Raza MD (01/25 0545)      Extensive perihepatic subcapsular abscess which is in communication with a right paracolic gutter abscess  Drainage catheter within the left subhepatic space does not appear to communicate with this abscess  Large perisplenic abscess with mass effect on the spleen  Peripheral hypodensities within the spleen likely represent splenic infarcts  Infectious involvement less likely but not excluded  Large left paracolic gutter abscess  Mesenteric abscess as described  Pelvic abscess as described, pelvic drainage catheter is anterior to the abscess  Patient is status post placement of esophageal stent as well as gastric sleeve procedure  No evidence of extravasated oral contrast is identified on this exam       Dependent lower lobe atelectasis and small bilateral pleural effusions  Extensive subcutaneous edema suggesting anasarca  Soft tissue gas identified within the left axillary soft tissues, correlate for a potential iatrogenic etiology  If this does not exist, an infectious process related to a gas-forming organism is should be excluded  I personally discussed this study with Mick Adrian on 1/25/2018 11:58 AM          Workstation performed: [id number]         XR shunt series   Final Result by Frederick Raza MD (01/25 1116)      Intact  shunt catheter           Workstation performed: [id number]         XR chest portable Final Result by Lord Janine MD (01/25 1002)      No active disease  Workstation performed: [id number]         CT head wo contrast   Final Result by Kenney Mcqueen MD (01/25 1945)      No acute intracranial abnormality  Right frontal approach catheter terminating in the region of the third ventricle  Ventricular system is decompressed  No evidence for interstitial edema or extra-axial fluid collections  Workstation performed: [id number]         IR image guided aspiration / drainage w tube    (Results Pending)   IR other    (Results Pending)   IR chest tube    (Results Pending)       EKG, Pathology, and Other Studies: I have personally reviewed pertinent reports        VTE Pharmacologic Prophylaxis: Heparin    VTE Mechanical Prophylaxis: sequential compression device

## 2018-01-31 NOTE — PROGRESS NOTES
01/31/18 0900   Plan of Care   Comments Talked with nurse   PT in procedure   Assessment Completed by: Unit visit

## 2018-01-31 NOTE — CASE MANAGEMENT
Continued Stay Review    Date: 1-31-18      Vital Signs: BP (!) 178/99   Pulse (!) 106   Temp 98 5 °F (36 9 °C) (Oral)   Resp 16   Ht 5' 4" (1 626 m)   Wt 70 6 kg (155 lb 10 3 oz)   LMP  (LMP Unknown)   SpO2 100%   Breastfeeding?  No   BMI 26 72 kg/m²     Medications:   Scheduled Meds:   Current Facility-Administered Medications:  Adult TPN (STANDARD BASE/STANDARD ELECTROLYTE)  Intravenous Continuous [place] Last Rate: 48 6 mL/hr at 01/30/18 2120   ampicillin-sulbactam 3 g Intravenous Q6H You Walters MD Last Rate: Stopped (01/31/18 1115)   fluconazole 400 mg Intravenous Q24H ABNER Gary Last Rate: 400 mg (01/30/18 1733)   gabapentin 300 mg Oral HS Vikki Louise PA-C    heparin (porcine) 5,000 Units Subcutaneous [facility] ABNER Gary    HYDROmorphone 0 5 mg Intravenous Q3H PRN Vikki Louise PA-C    HYDROmorphone 1 mg Intravenous Q3H PRN Xu Celaya MD    insulin lispro 1-5 Units Subcutaneous Q6H [facility] Brenden Ramirez MD    iohexol 50 mL Oral 90 min pre-procedure Chapin Juárez MD    multivitamin 1 tablet Oral Daily Vikki Louise PA-C    nortriptyline 10 mg Oral BID Vikki Louise PA-C    oxyCODONE 15 mg Oral Q4H PRN Staci Carbone PA-C    Or        oxyCODONE 10 mg Oral Q4H PRN Staci Carbone PA-C    pantoprazole 40 mg Oral Early Morning Vikki Louise PA-C    polyethylene glycol 17 g Oral Daily PRN Vikki Louise PA-C    potassium chloride 40 mEq Intravenous Once Staci Carbone PA-C Last Rate: 40 mEq (01/31/18 1035)   senna-docusate sodium 1 tablet Oral HS ABNER Tse    sodium chloride 125 mL/hr Intravenous Continuous Staci Carbone PA-C Last Rate: 125 mL/hr (01/31/18 1115)   thiamine 100 mg Oral Daily Vikki Louise PA-C      Continuous Infusions:   Adult TPN (STANDARD BASE/STANDARD ELECTROLYTE)  Last Rate: 48 6 mL/hr at 01/30/18 2120   sodium chloride 125 mL/hr Last Rate: 125 mL/hr (01/31/18 1115)     PRN Meds: HYDROmorphone   HYDROmorphone    oxyCODONE **OR** oxyCODONE    polyethylene glycol    Abnormal Labs/Diagnostic Results:    Lab Units 01/31/18  0436 01/30/18  0441 01/29/18  0501   WBC Thousand/uL 23 57* 32 71* 25 53*   HEMOGLOBIN g/dL 9 0* 9 9* 10 1*   HEMATOCRIT % 27 0* 29 3* 30 9*   PLATELETS Thousands/uL 711* 795* 753*   MONO PCT MAN % 2* 3* 4     Lab Units 01/31/18  0436 01/30/18  0441 01/29/18  0501 01/24/18  2244   SODIUM mmol/L 135* 133* 136 137   POTASSIUM mmol/L 3 4* 3 9 4 1 4 9   CHLORIDE mmol/L 96* 95* 99* 102   CREATININE mg/dL 0 31* 0 37* 0 34* 0 37*   CALCIUM mg/dL 9 8 10 3* 9 9 10 2*   TOTAL PROTEIN g/dL  --   --   --  5 3*     Age/Sex: 36 y o  female     Assessment/Plan:       CRITICAL CARE   Neuro:   · No intubated or requiring sedation at this time  · Pain controlled with: PO gabapentin, oxycodone 15/10  PRN IV dilaudid 1mg (x3 overnight)  ? Change oxycodone to tablets from liquid  ·  shunt 2/2 pseudotumor cerebri   ? s/p externalization 1/25   ? EVD clamped yesterday afternoon  ? Purulent drainage intra-op - culture grew enterococcus  · Regulate sleep/wake cycle     CV:   Hemodynamically stable  Not requiring pressors at this time  · Complaints of intermittent chest pain - EKG done overnight  CT yesterday demonstrated moderate to large bilateral pleural effusions  Consider thoracentesis  · Grade 3/6 systolic murmur with radiation to axilla - ECHO done with notation of poor study due to surgical incisions  Consider repeat or KACIE when able  · MAP's > 65     Resp:   ? No issues at this time     GI:   · Peritonitis 2/2 GE junction leak  ? S/p EGD 1/24 with stent placed  ? S/p ex-lap 1/25 for abdominal abscess and fluid collections  ? IR this morning for image guided aspiration, no planned surgical intervention at this time    ? 4 ADENIKE drains in place   § RUQ - 36ml  § RLQ - 34ml        § LUQ - 19ml  § LLQ - 259ml   § All serosanguinous drainage except for LLQ which has brown/gray   · Stress ulcer prophylaxis: protonix po  · Bowel regimen: senokot, miralax     F/E/N:   · Nutrition/diet plan: NPO w/ sips due to leak  ? NS at 125ml/hr  ? Restarted TPN  · Replete electrolytes with goals: K >4 0, Mag >2 0, and Phos >3 0  ? Hyponatremia - up to 135  Continue to monitor  ? Hypokalemia - repleted     :   · Indwelling Giles present: no  · 1 45L UOP in 24 hrs     ? Creatinine 0 31  ? Trend UOP and BUN/creat     ID:   · Leukocytosis trending up - 32 7 from 25 yesterday, 13 5 previously  ? Bandemia of 3  ? Remained afebrile - tmax 98 8  · Abx ordered: Unasyn and fluconazole added yesterday  ? ID following with plan for 4-6 weeks of abx therapy  ? Chest & abdominal wall and catheter tip culture - enterococcus  ? Fungal culture - positive for yeast  ? CSF culture - negative     Heme:   · Hemoglobin down to 9 from 9 9 this morning  · AC - heparin sub-q     Endo:   · Glycemic control plan: Algorithm 2     MSK/Skin:  · Frequent turning and pressure off-loading  · Local wound care as needed     Lines:  ? Central venous access: R basilic PICC  ? BARIATRICS     UGI that was originally read as normal, now on second review appears to have refluxing contrast along distal portion of stent with extravasation at gastric perforation at the mid stent location on one image  CTCAP was performed and shown to have contrast extrav to the LUQ with a large perisplenic fluid collection  My recommendations at the time of our discussion (1/30/2018) were to place the patient NPO, consult IR for drainage of the collection, maintain the current stent for a minimum of 6 weeks, and continue TPN  40-year-old female status post gastric sleeve now presenting with undrained perisplenic collection containing air for percutaneous drainage, and new bilateral pleural effusions for chest tube placement    Patient also requires Darl [place] placement, and has a covered stent in place within her distal esophagus and stomach, complicating placement    INTERVENTIONAL RADIOLOGY PROCEDURE NOTE     Preoperative diagnosis:  Bilateral pleural effusions     Postoperative diagnosis: Same     Procedure:  Bilateral chest tube placement    INTERVENTIONAL RADIOLOGY PROCEDURE NOTE     Preoperative diagnosis:  Left upper quadrant abscess     Postoperative diagnosis: Same     Procedure:  Ultrasound-guided abscess drainage    INTERVENTIONAL RADIOLOGY PROCEDURE NOTE     Preoperative diagnosis:  Postoperative leak after gastric sleeve covered stent in place      Postoperative diagnosis: Same     Procedure:  Keofed placement under fluoroscopy     Findings:  Very difficult placement due to stent position, and unable to pass the tube beyond the duodenal bulb with the weight in place    The tube weight was cut off, and the tube was advanced into the 3rd portion of the duodenum over a stiff Glidewire      Complications:  None immediate     Anesthesia: IV conscious sedation      Discharge Plan:   1619 [address]

## 2018-01-31 NOTE — PROGRESS NOTES
Progress Note - Infectious Disease   Ayaz Wellington 36 y o  female MRN: [id number]  Unit/Bed#: ICU 10 Encounter: [id number]      Impression/Recommendations:  1   Intra-abdominal/pelvic abscesses   Patient is status post repeat abdominal washout   She has multiple drains in place   She is status post placement of drainage in a new perisplenic collection  She is clinically well and systemically stable  Operative culture from [facility], growing only ampicillin susceptible Enterococcus   Operative culture here also growing  only ampicillin susceptible Enterococcus  Continue with IV Unasyn  Continue fluconazole  Follow-up on culture of new perisplenic collection      2   Possible infected  shunt   Given presence of tip of  shunt in peritoneal space, there is high probability of  shunt infection   Fortunately, patient has no symptoms concerning for meningitis   She has neurological deficits   She is status post tapping of  shunt at [facility]   CSF culture there had no growth but patient had prior antibiotic  Berjessica Olivo will go ahead and treat her for possible/presumptive  shunt infection   Patient is now status post  shunt externalization   CSF culture here also negative       Antibiotic plan as in above  Likely treat with IV antibiotic x 4-6 weeks      3   Gastric perforation, status post repair, with persistent leak   She is now status post placement of esophageal stent   Repeat video barium swallow from last week with no further leakage  Unfortunately, repeat abdomen/pelvis CT showed recurrent leak with new perisplenic collection  She is now status post placement of duodenal feeding tube and drainage of the perisplenic collection  Monitor for recurrent leak    Patient will most likely need a repeat barium swallow down the line      4   Recurrent leukocytosis   Patient has no fever   She is clinically and systemically stable   This is most likely secondary to recurrent leak and abscess in upper abdomen  WBC now decreased with drainage of new abscess  Antibiotic plan as in above  Monitor WBC and temperature      5  Septic shock, on presentation at [facility]   This is secondary to gastric perforation   Patient is now hemodynamically stable   All cultures from [address] [place] [address] obtained and reviewed, now on chart   All blood cultures were negative   Urine culture negative   CSF culture negative   Operative culture positive for Enterococcus, as in above  Antibiotic plan as in above  Monitor hemodynamics      Discussed with the patient and her family  in detail regarding above plan      Antibiotics:  Unasyn  POD # 6  Fluconazole # 2     Subjective:  Patient is status post drainage of new perisplenic abscess  She is status post placement of bilateral chest tube  She is status post placement of feeding tube into duodenum  Patient is comfortable   Abdominal pain stable  Chest pain mild, at chest tube site  No headache  No chest wall pain  Patient remains in ICU  Temperature stays down   No chills  She is tolerating antibiotic well   No nausea, vomiting or diarrhea  Objective:  Vitals:  HR:  [] 106  Resp:  [11-25] 16  BP: (130-187)/() 178/99  SpO2:  [88 %-100 %] 100 %  Temp (24hrs), Av 4 °F (36 9 °C), Min:97 8 °F (36 6 °C), Max:98 8 °F (37 1 °C)  Current: Temperature: 98 5 °F (36 9 °C)    Physical Exam:     General: Awake, alert, cooperative, no distress  Chest:  VPS externalization site clean  No drainage  No erythema/warmth  No tenderness  Lungs: Decreased breath sounds, sparse bibasilar rales, no wheezing, respirations unlabored  Heart[de-identified]  Regular rate and rhythm, S1 and S2 normal, no murmur  Abdomen: Soft, nondistended, non-tender, bowel sounds active all four quadrants,        no masses, no organomegaly  Drains seropurulent  Extremities: No edema  No erythema/warmth  No ulcer  Nontender to palpation  Skin:  No rash       Invasive Devices     Peripherally Inserted Central Catheter Line            PICC Line 95/56/09 Right Basilic 10 days          Drain            Closed/Suction Drain Left LLQ Bulb 19 Fr  5 days    Closed/Suction Drain Left LUQ Bulb 19 Fr  5 days    Closed/Suction Drain Right RLQ Bulb 19 Fr  5 days    Closed/Suction Drain Right RUQ Accordion 19 Fr  5 days    Ventriculostomy/Subdural Ventricular drainage catheter Right 5 days    Chest Tube 1 Right Pleural 10 Fr  less than 1 day    Chest Tube 2 Left Pleural 10 Fr  less than 1 day    Closed/Suction Drain Left Abdomen Bulb 12 Fr  less than 1 day    NG/OG/Enteral Tube Enteral Feeding Tube Right nares less than 1 day                Labs studies:   I have personally reviewed pertinent labs  Results from last 7 days  Lab Units 01/31/18  0436 01/30/18  0441 01/29/18  0501  01/24/18  2244   SODIUM mmol/L 135* 133* 136  < > 137   POTASSIUM mmol/L 3 4* 3 9 4 1  < > 4 9   CHLORIDE mmol/L 96* 95* 99*  < > 102   CO2 mmol/L 32 30 31  < > 31   ANION GAP mmol/L 7 8 6  < > 4   BUN mg/dL 16 21 22  < > 21   CREATININE mg/dL 0 31* 0 37* 0 34*  < > 0 37*   EGFR ml/min/1 73sq m 139 131 135  < > 131   GLUCOSE RANDOM mg/dL 127 73 78  < > 109   GLUCOSE, ISTAT   --   --   --   < >  --    CALCIUM mg/dL 9 8 10 3* 9 9  < > 10 2*   AST U/L  --   --   --   --  28   ALT U/L  --   --   --   --  61   ALK PHOS U/L  --   --   --   --  177*   TOTAL PROTEIN g/dL  --   --   --   --  5 3*   BILIRUBIN TOTAL mg/dL  --   --   --   --  0 91   < > = values in this interval not displayed      Results from last 7 days  Lab Units 01/31/18  0436 01/30/18  0441 01/29/18  0501   WBC Thousand/uL 23 57* 32 71* 25 53*   HEMOGLOBIN g/dL 9 0* 9 9* 10 1*   PLATELETS Thousands/uL 711* 795* 753*       Results from last 7 days  Lab Units 01/30/18  1151 01/25/18  1851 01/25/18  1819 01/25/18  1704   GRAM STAIN RESULT  No Polys or Bacteria seen No Polys or Bacteria seen 3+ Polys  No bacteria seen 1+ Polys  No bacteria seen   WOUND CULTURE   --   --  2+ Growth of Enterococcus faecalis*  --        Imaging Studies:   I have personally reviewed pertinent imaging study reports and images in PACS  EKG, Pathology, and Other Studies:   I have personally reviewed pertinent reports

## 2018-01-31 NOTE — PROGRESS NOTES
Progress Note- Bertell Gilford 36 y o  female MRN: [id number]    Unit/Bed#: ICU 10 Encounter: [id number]      Assessment and Plan:  50-year-old female who was recently transferred to [facility] after a complicated hospital course at [facility]     She has a history of a gastric sleeve surgery by Dr Shruthi Richards at [facility] about a year ago and initially lost 100 lbs  She tells me that she had some reflux symptoms which prompted hernia repair surgery earlier this month  She also has a  shunt      She presented to [facility] after discharge in septic shock and was found to have a gastric perforation and is now status post ex lap times two as well as externalization of her  shunt  Cultures growing enterococcus   While at [facility] she had an esophageal stent placed to help aid in healing of her perforation  More recently she had increased leukocytosis which prompted a repeat CT scan which showed increased extravasation of contrast in the upper abdomen suggestive of persistent leak        ID following and antifungals added by them to her unasyn  Today she had a further drain placement done by IR  She also had a KO fed place  I discussed with the primary team that I would keep her strict NPO as it will take quite some time for this perforation to heal   She will likely need to be on TPN for a while  I would recommend a repeat esophagram and CT scan in the coming weeks in order to prove that the perforation has closed  I discussed this in detail with the critical care team   Agree with having multiple surgical subspecialists on board in the event that she would decompensate as this would likely be a complicated surgery  ______________________________________________________________________    Subjective:     Pt now had IR perisplenic drain placement as well as b/l chest tubes  Also had kaofed placed      Medication Administration - last 24 hours from 01/30/2018 1828 to 01/31/2018 1828       Date/Time Order Dose Route Action Action by     01/31/2018 1814 insulin lispro (HumaLOG) 100 units/mL subcutaneous injection 1-5 Units 1 Units Subcutaneous Not Given Marina Coppola RN     01/31/2018 1200 insulin lispro (HumaLOG) 100 units/mL subcutaneous injection 1-5 Units 1 Units Subcutaneous Not Given Marina Copploa RN     01/31/2018 0559 insulin lispro (HumaLOG) 100 units/mL subcutaneous injection 1-5 Units 1 Units Subcutaneous Given Berry Dugan RN     01/31/2018 9094 insulin lispro (HumaLOG) 100 units/mL subcutaneous injection 1-5 Units 1 Units Subcutaneous Not Given Berry Dugan RN     01/30/2018 1838 insulin lispro (HumaLOG) 100 units/mL subcutaneous injection 1-5 Units 1 Units Subcutaneous Not Given Riky Sanches RN     01/30/2018 2204 senna-docusate sodium (SENOKOT S) 8 6-50 mg per tablet 1 tablet 1 tablet Oral Given Berry Dugan RN     01/31/2018 1528 heparin (porcine) subcutaneous injection 5,000 Units 5,000 Units Subcutaneous Given Marina Coppola RN     01/31/2018 8850 heparin (porcine) subcutaneous injection 5,000 Units 5,000 Units Subcutaneous Given Berry Dugan RN     01/30/2018 2125 heparin (porcine) subcutaneous injection 5,000 Units 5,000 Units Subcutaneous Given Berry Dugan RN     01/31/2018 1605 ampicillin-sulbactam (UNASYN) 3 g in sodium chloride 0 9 % 100 mL IVPB 0 g Intravenous Stopped Marina Coppola RN     01/31/2018 1535 ampicillin-sulbactam (UNASYN) 3 g in sodium chloride 0 9 % 100 mL IVPB 3 g Intravenous [address] Marina Coppola RN     01/31/2018 1115 ampicillin-sulbactam (UNASYN) 3 g in sodium chloride 0 9 % 100 mL IVPB 0 g Intravenous Stopped Marina Coppola RN     01/31/2018 1042 ampicillin-sulbactam (UNASYN) 3 g in sodium chloride 0 9 % 100 mL IVPB 3 g Intravenous [address] Marina Coppola, [facility]     01/31/2018 0433 ampicillin-sulbactam (UNASYN) 3 g in sodium chloride 0 9 % 100 mL IVPB 0 g Intravenous Stopped Berry Dugan RN     01/31/2018 0698 ampicillin-sulbactam (UNASYN) 3 g in sodium chloride 0 9 % 100 mL IVPB 3 g Intravenous [address] Hiram Henning RN     01/30/2018 2153 ampicillin-sulbactam (UNASYN) 3 g in sodium chloride 0 9 % 100 mL IVPB 0 g Intravenous Stopped Hiram Henning RN     01/30/2018 2123 ampicillin-sulbactam (UNASYN) 3 g in sodium chloride 0 9 % 100 mL IVPB 3 g Intravenous [address] Hiram Henning RN     01/31/2018 1304 HYDROmorphone (DILAUDID) injection 0 5 mg 0 5 mg Intravenous Given Bradley Andrea RN     01/30/2018 1838 HYDROmorphone (DILAUDID) injection 0 5 mg 0 5 mg Intravenous Given Kieran Alejandre, RN     01/31/2018 0552 pantoprazole (PROTONIX) EC tablet 40 mg 40 mg Oral Given Hiram Henning RN     01/31/2018 1036 multivitamin (FLINTSTONES) chewable tablet 1 tablet 1 tablet Oral Not Given Lazara Tolentino, RN     01/30/2018 2204 gabapentin (NEURONTIN) capsule 300 mg 300 mg Oral Given Hirma Henning RN     01/31/2018 1036 nortriptyline (PAMELOR) capsule 10 mg 10 mg Oral Not Given Lazara Tolentino RN     01/30/2018 1839 nortriptyline (PAMELOR) capsule 10 mg 10 mg Oral Not Given Kieran Alejandre, RN     01/31/2018 1036 thiamine (VITAMIN B1) tablet 100 mg 100 mg Oral Not Given Lazara Tolentino RN     01/31/2018 1036 oxyCODONE (ROXICODONE) oral solution 15 mg 0 mg Oral Return to [employer], RN     01/30/2018 2133 oxyCODONE (ROXICODONE) oral solution 15 mg 15 mg Oral Not Given Hiram Henning RN     01/31/2018 1036 oxyCODONE (ROXICODONE) oral solution 10 mg   Oral See Alternative Lazara Tolentino, MANDIE     01/30/2018 2133 oxyCODONE (ROXICODONE) oral solution 10 mg   Oral See Alternative Hiram Henning RN     01/30/2018 2120 Adult 3-in-1 TPN (standard base / standard electrolytes)   Intravenous New 3100 Sheila Oliveira, RN     01/31/2018 1605 sodium chloride 0 9 % infusion 125 mL/hr Intravenous Restarted Lazara Tolentino RN     01/31/2018 1536 sodium chloride 0 9 % infusion 0 mL/hr Intravenous Stopped Lazara Tolentino RN     01/31/2018 1338 sodium chloride 0 9 % infusion 125 mL/hr Intravenous [address] Kaz Mcdermott, [address] [facility]     01/31/2018 1115 sodium chloride 0 9 % infusion 125 mL/hr Intravenous Restarted Kaz Mcdermott, RN     01/31/2018 1042 sodium chloride 0 9 % infusion 0 mL/hr Intravenous Stopped Kaz Mcdermott, RN     01/31/2018 8529 sodium chloride 0 9 % infusion 125 mL/hr Intravenous [facility], RN     01/31/2018 0202 sodium chloride 0 9 % infusion 0 mL/hr Intravenous Stopped Michelle Talbot, RN     01/30/2018 2153 sodium chloride 0 9 % infusion 125 mL/hr Intravenous Restarted Michelle Talbot, RN     01/30/2018 2125 sodium chloride 0 9 % infusion 0 mL/hr Intravenous Stopped Michelle Talbot, RN     01/31/2018 1801 fluconazole (DIFLUCAN) IVPB (premix) 400 mg 0 mg Intravenous Stopped Kaz Mcdermott, RN     01/31/2018 1528 fluconazole (DIFLUCAN) IVPB (premix) 400 mg 400 mg Intravenous [facility] Marbisi Chino, [address] [place] [address]     01/31/2018 1514 HYDROmorphone (DILAUDID) injection 1 mg 1 mg Intravenous Given Rubio Nelson, RN     01/31/2018 1035 HYDROmorphone (DILAUDID) injection 1 mg 1 mg Intravenous Given Kaz Mcdermott, RN     01/31/2018 0552 HYDROmorphone (DILAUDID) injection 1 mg 1 mg Intravenous Given Michelle Talbot, RN     01/31/2018 0150 HYDROmorphone (DILAUDID) injection 1 mg 1 mg Intravenous Given Michelle Talbot, RN     01/30/2018 2200 HYDROmorphone (DILAUDID) injection 1 mg 1 mg Intravenous Given Michelle Talbot, RN     01/31/2018 1336 potassium chloride 40 mEq IVPB (premix) 0 mEq Intravenous Stopped Kaz Mcdermott, RN     01/31/2018 1035 potassium chloride 40 mEq IVPB (premix) 40 mEq Intravenous [facility], [facility]     01/31/2018 5279 midazolam (VERSED) injection 1 mg Intravenous Given Gina Arcos RN     01/31/2018 0820 midazolam (VERSED) injection 2 mg Intravenous Given Gina Arcos RN     01/31/2018 0841 fentanyl citrate (PF) 100 MCG/2ML 50 mcg Intravenous Given Gina Arcos RN     01/31/2018 0820 fentanyl citrate (PF) 100 MCG/2ML 50 mcg Intravenous Given Gina Arcos RN     01/31/2018 0916 midazolam (VERSED) injection 1 mg Intravenous Given Meena Tobias RN     01/31/2018 9389 fentanyl citrate (PF) 100 MCG/2ML 50 mcg Intravenous Given Meena Tobias RN     01/31/2018 9685 fentanyl citrate (PF) 100 MCG/2ML 50 mcg Intravenous Given Meena Tobias RN     01/31/2018 1016 iohexol (OMNIPAQUE) 300 mg/mL injection 50 mL 10 mL Other Given Ethelene Able     01/31/2018 1302 thiamine (VITAMIN B1) 100 mg in sodium chloride 0 9 % 50 mL IVPB 100 mg Intravenous Not Given Sukhdeep Ramirez RN     01/31/2018 1327 pantoprazole (PROTONIX) injection 40 mg 40 mg Intravenous Given Sukhdeep Ramirez RN          Objective:     Vitals: Blood pressure 153/98, pulse 94, temperature 98 °F (36 7 °C), temperature source Oral, resp  rate 22, height 5' 4" (1 626 m), weight 70 6 kg (155 lb 10 3 oz), SpO2 99 %, not currently breastfeeding  ,Body mass index is 26 72 kg/m²        Intake/Output Summary (Last 24 hours) at 01/31/18 1828  Last data filed at 01/31/18 1801   Gross per 24 hour   Intake          5054 81 ml   Output             3363 ml   Net          1691 81 ml       Physical Exam:   General Appearance: Awake and alert, in no acute distress  Abdomen: Soft, non-tender, non-distended; bowel sounds normal; no masses or no organomegaly    Invasive Devices     Peripherally Inserted Central Catheter Line            PICC Line 74/45/49 Right Basilic 10 days          Drain            Closed/Suction Drain Left LLQ Bulb 19 Fr  5 days    Closed/Suction Drain Left LUQ Bulb 19 Fr  5 days    Closed/Suction Drain Right RLQ Bulb 19 Fr  5 days    Closed/Suction Drain Right RUQ Accordion 19 Fr  5 days    Ventriculostomy/Subdural Ventricular drainage catheter Right 5 days    Chest Tube 1 Right Pleural 10 Fr  less than 1 day    Chest Tube 2 Left Pleural 10 Fr  less than 1 day    Closed/Suction Drain Left Abdomen Bulb 12 Fr  less than 1 day    NG/OG/Enteral Tube Enteral Feeding Tube Right nares less than 1 day                Lab Results:  Admission on 01/24/2018   No results displayed because visit has over 200 results  Imaging Studies: I have personally reviewed pertinent imaging studies

## 2018-01-31 NOTE — PROGRESS NOTES
Yesterday (1/30/2018), I personally discussed the CTCAP results with Dr Chase James and reviewed the images myself  UGI that was originally read as normal, now on second review appears to have refluxing contrast along distal portion of stent with extravasation at gastric perforation at the mid stent location on one image  CTCAP was performed and shown to have contrast extrav to the LUQ with a large perisplenic fluid collection  My recommendations at the time of our discussion (1/30/2018) were to place the patient NPO, consult IR for drainage of the collection, maintain the current stent for a minimum of 6 weeks, and continue TPN  I will continue to follow along closely      Tatianna Olmedo MD  1/31/2018  8:56 AM

## 2018-01-31 NOTE — PROGRESS NOTES
Progress Note - Critical Care   Jayla Ro 36 y o  female MRN: [id number]  Unit/Bed#: ICU 10 Encounter: [id number]    Impression:  Principal Problem:    Gastric leak  Active Problems:    Acute peritonitis    Idiopathic intracranial hypertension    S/P  shunt     (ventriculoperitoneal) shunt status    Murmur, cardiac      Plan:    Neuro:   · No intubated or requiring sedation at this time  · Pain controlled with: PO gabapentin, oxycodone 15/10  PRN IV dilaudid 1mg (x3 overnight)  · Change oxycodone to tablets from liquid  ·  shunt 2/2 pseudotumor cerebri   · s/p externalization 1/25   · EVD clamped yesterday afternoon  · Purulent drainage intra-op - culture grew enterococcus  · Regulate sleep/wake cycle    CV:   Hemodynamically stable  Not requiring pressors at this time  · Complaints of intermittent chest pain - EKG done overnight  CT yesterday demonstrated moderate to large bilateral pleural effusions  Consider thoracentesis  · Grade 3/6 systolic murmur with radiation to axilla - ECHO done with notation of poor study due to surgical incisions  Consider repeat or KACIE when able  · MAP's > 65    Resp:   · No issues at this time    GI:   · Peritonitis 2/2 GE junction leak  · S/p EGD 1/24 with stent placed  · S/p ex-lap 1/25 for abdominal abscess and fluid collections  · IR this morning for image guided aspiration, no planned surgical intervention at this time  · 4 ADENIKE drains in place   · RUQ - 36ml  · RLQ - 34ml   · LUQ - 19ml  · LLQ - 259ml   · All serosanguinous drainage except for LLQ which has brown/gray   · Stress ulcer prophylaxis: protonix po  · Bowel regimen: senokot, miralax    F/E/N:   · Nutrition/diet plan: NPO w/ sips due to leak  · NS at 125ml/hr  · Restarted TPN  · Replete electrolytes with goals: K >4 0, Mag >2 0, and Phos >3 0  · Hyponatremia - up to 135   Continue to monitor  · Hypokalemia - repleted    :   · Indwelling Giles present: no  · 1 45L UOP in 24 hrs   · Creatinine 0 31  · Trend UOP and BUN/creat    ID:   · Leukocytosis trending up - 32 7 from 25 yesterday, 13 5 previously  · Bandemia of 3  · Remained afebrile - tmax 98 8  · Abx ordered: Unasyn and fluconazole added yesterday  · ID following with plan for 4-6 weeks of abx therapy  · Chest & abdominal wall and catheter tip culture - enterococcus  · Fungal culture - positive for yeast  · CSF culture - negative    Heme:   · Hemoglobin down to 9 from 9 9 this morning  · AC - heparin sub-q    Endo:   · Glycemic control plan: Algorithm 2    MSK/Skin:  · Frequent turning and pressure off-loading  · Local wound care as needed    Lines:  · Central venous access: R basilic PICC    VTE Prophylaxis:  · Pharmacologic Prophylaxis: heparin  · Mechanical Prophylaxis: sequential compression device    Disposition: Continue ICU care    Consultants: General surgery, Neurosurg    Reason for Admission: Abdominal abscess    HPI/24hr events:   Nothing significant overnight  Physical Exam:    Physical Exam   Constitutional: She is oriented to person, place, and time  She appears well-developed  No distress  HENT:   Head: Normocephalic and atraumatic  Eyes: EOM are normal  Pupils are equal, round, and reactive to light  Neck: Normal range of motion  Neck supple  No JVD present  No tracheal deviation present  Cardiovascular: Normal rate and regular rhythm  Exam reveals no gallop and no friction rub  Murmur heard  Systolic murmur is present with a grade of 3/6   Systolic murmur radiates to axilla   Pulmonary/Chest: Effort normal and breath sounds normal  No respiratory distress  She has no wheezes  She has no rales  Abdominal: Soft  She exhibits no distension  Bowel sounds are decreased  There is tenderness  Midline incision with dressing present  4 ADENIKE drains in place  LLQ with brown/gray purulent drainage  Diffuse abdominal tenderness, soft, non-distended  Musculoskeletal: Normal range of motion  She exhibits edema     Neurological: She is alert and oriented to person, place, and time  GCS eye subscore is 4  GCS verbal subscore is 5  GCS motor subscore is 6  Skin: Skin is warm and dry  She is not diaphoretic  Psychiatric: She has a normal mood and affect  Her behavior is normal        Vitals:   Vitals:    18 0400 18 0604 18 0701 18 0731   BP: 153/84 160/77 153/83 (!) 172/94   Pulse: 96 92 96 96   Resp: 12 (!) 25 13 20   Temp: 98 5 °F (36 9 °C)      TempSrc: Oral      SpO2: 97% 98% 97% 98%   Weight:       Height:         Arterial Line BP: 166/82  Arterial Line MAP (mmHg): 112 mmHg    Temperature: Temp (24hrs), Av 5 °F (36 9 °C), Min:97 8 °F (36 6 °C), Max:98 8 °F (37 1 °C)  Current: Temperature: 98 5 °F (36 9 °C)    Weights: IBW: 54 7 kg  Body mass index is 26 72 kg/m²  Hemodynamic Monitoring:  N/A       Respiratory:  SpO2: SpO2: 98 %, SpO2 Activity: SpO2 Activity: At Rest, SpO2 Device: O2 Device: Nasal cannula  O2 Flow Rate (L/min): 1 L/min    Intake and Outputs:    Intake/Output Summary (Last 24 hours) at 18 0745  Last data filed at 18 0600   Gross per 24 hour   Intake          4630 78 ml   Output             1803 ml   Net          2827 78 ml     I/O last 24 hours: In: 4630 8 [P O :; I V :1869 6; IV Piggyback:350; TPN:421 2]  Out: 3551 [Urine:1450; Drains:353]    Stool:      Nutrition:        Diet Orders            Start     Ordered    18 1339  Diet NPO; Sips with meds  Diet effective now     Question Answer Comment   Diet Type NPO    NPO Except: Sips with meds    RD to adjust diet per protocol?  No        18 1338            Labs:     Results from last 7 days  Lab Units 18  0436 18  0441 18  0501   WBC Thousand/uL 23 57* 32 71* 25 53*   HEMOGLOBIN g/dL 9 0* 9 9* 10 1*   HEMATOCRIT % 27 0* 29 3* 30 9*   PLATELETS Thousands/uL 711* 795* 753*   MONO PCT MAN % 2* 3* 4       Results from last 7 days  Lab Units 18  0436 18  0441 18  0501  18  2241 SODIUM mmol/L 135* 133* 136  < > 137   POTASSIUM mmol/L 3 4* 3 9 4 1  < > 4 9   CHLORIDE mmol/L 96* 95* 99*  < > 102   CO2 mmol/L 32 30 31  < > 31   BUN mg/dL 16 21 22  < > 21   CREATININE mg/dL 0 31* 0 37* 0 34*  < > 0 37*   CALCIUM mg/dL 9 8 10 3* 9 9  < > 10 2*   TOTAL PROTEIN g/dL  --   --   --   --  5 3*   BILIRUBIN TOTAL mg/dL  --   --   --   --  0 91   ALK PHOS U/L  --   --   --   --  177*   ALT U/L  --   --   --   --  61   AST U/L  --   --   --   --  28   GLUCOSE RANDOM mg/dL 127 73 78  < > 109   GLUCOSE, ISTAT   --   --   --   < >  --    < > = values in this interval not displayed  Results from last 7 days  Lab Units 01/31/18  0436 01/30/18  0441 01/28/18  0507   MAGNESIUM mg/dL 2 0 1 8 1 7       Results from last 7 days  Lab Units 01/31/18  0436 01/30/18  0441 01/28/18  0507   PHOSPHORUS mg/dL 3 7 3 7 3 9        Results from last 7 days  Lab Units 01/24/18 1954   INR  1 26*   PTT seconds 28       Results from last 7 days  Lab Units 01/25/18  2118 01/24/18 1954   LACTIC ACID mmol/L 1 3 1 5           Results from last 7 days  Lab Units 01/25/18 2252 01/25/18 2125   PH ART  7 484* 7 508*   PCO2 ART mm Hg 33 6* 32 4*   PO2 ART mm Hg 165 1* 165 0*   HCO3 ART mmol/L 24 7 25 2   BASE EXC ART mmol/L 1 6 2 5   ABG SOURCE  Line, Arterial Line, Arterial               Imaging:   Xr Chest Portable    Result Date: 1/25/2018  Impression: No active disease  Workstation performed: [id number]     Xr Abdomen 1 View Kub    Result Date: 1/25/2018  Impression: Fluoroscopic guidance for placement of feeding tube with tip in the 1st portion of the duodenum  Please refer to the separate procedure notes for additional details  Workstation performed: [id number]     Ct Head Wo Contrast    Result Date: 1/25/2018  Impression: No acute intracranial abnormality  Right frontal approach catheter terminating in the region of the third ventricle  Ventricular system is decompressed    No evidence for interstitial edema or extra-axial fluid collections  Workstation performed: [id number]     Ct Chest Abdomen Pelvis W Contrast    Result Date: 1/25/2018  Impression: Extensive perihepatic subcapsular abscess which is in communication with a right paracolic gutter abscess  Drainage catheter within the left subhepatic space does not appear to communicate with this abscess  Large perisplenic abscess with mass effect on the spleen  Peripheral hypodensities within the spleen likely represent splenic infarcts  Infectious involvement less likely but not excluded  Large left paracolic gutter abscess  Mesenteric abscess as described  Pelvic abscess as described, pelvic drainage catheter is anterior to the abscess  Patient is status post placement of esophageal stent as well as gastric sleeve procedure  No evidence of extravasated oral contrast is identified on this exam  Dependent lower lobe atelectasis and small bilateral pleural effusions  Extensive subcutaneous edema suggesting anasarca  Soft tissue gas identified within the left axillary soft tissues, correlate for a potential iatrogenic etiology  If this does not exist, an infectious process related to a gas-forming organism is should be excluded  I personally discussed this study with Lisa Wooten on 1/25/2018 11:58 AM  Workstation performed: [id number]     UM Shunt Series    Result Date: 1/25/2018  Impression: Intact  shunt catheter   Workstation performed: [id number]       Micro:   Blood Culture: No results found for: Judah Dubin  Urine Culture: No results found for: URINECX  Sputum Culture: No components found for: SPUTUMCX  Wound Culure:   Lab Results   Component Value Date    WOUNDCULT 2+ Growth of Enterococcus faecalis (A) 01/25/2018       Results from last 7 days  Lab Units 01/30/18  1151 01/25/18  1851 01/25/18  1819 01/25/18  1704   GRAM STAIN RESULT  No Polys or Bacteria seen No Polys or Bacteria seen 3+ Polys  No bacteria seen 1+ Polys  No bacteria seen   WOUND CULTURE   --   --  2+ Growth of Enterococcus faecalis*  --        Allergies:    Allergies   Allergen Reactions    Benadryl [Diphenhydramine] Swelling    Phenergan [Promethazine] Hives       Medications:   Scheduled Meds:    Current Facility-Administered Medications:  Adult TPN (STANDARD BASE/STANDARD ELECTROLYTE)  Intravenous Continuous Aishwarya Gess Last Rate: 48 6 mL/hr at 01/30/18 2120   ampicillin-sulbactam 3 g Intravenous Q6H Dorian Santos MD Last Rate: Stopped (01/31/18 0433)   fluconazole 400 mg Intravenous Q24H ABNER Hugo Last Rate: 400 mg (01/30/18 1733)   gabapentin 300 mg Oral HS Alma Pantoja PA-C    heparin (porcine) 5,000 Units Subcutaneous [facility] ABNER Hugo    HYDROmorphone 0 5 mg Intravenous Q3H PRN Alma Pantoja PA-C    HYDROmorphone 1 mg Intravenous Q3H PRN Adelaide Steven MD    insulin lispro 1-5 Units Subcutaneous Q6H [facility] Yusef Franco MD    iohexol 50 mL Oral 90 min pre-procedure Missy Richter MD    multivitamin 1 tablet Oral Daily Alma Pantoja PA-C    nortriptyline 10 mg Oral BID Alma Pantoja PA-C    oxyCODONE 15 mg Oral Q4H PRN Staci Carbone PA-C    Or        oxyCODONE 10 mg Oral Q4H PRN Staci Carbone PA-C    pantoprazole 40 mg Oral Early Morning Alma Pantoja PA-C    polyethylene glycol 17 g Oral Daily PRN Alma Pantoja PA-C    potassium chloride 40 mEq Intravenous Once Staci Carbone PA-C    senna-docusate sodium 1 tablet Oral HS ABNER Tse    sodium chloride 125 mL/hr Intravenous Continuous Staci Carbone PA-C Last Rate: 125 mL/hr (01/31/18 0204)   thiamine 100 mg Oral Daily Alma Pantoja PA-C      Continuous Infusions:    Adult TPN (STANDARD BASE/STANDARD ELECTROLYTE)  Last Rate: 48 6 mL/hr at 01/30/18 2120   sodium chloride 125 mL/hr Last Rate: 125 mL/hr (01/31/18 0204)     PRN Meds:    HYDROmorphone 0 5 mg Q3H PRN   HYDROmorphone 1 mg Q3H PRN   oxyCODONE 15 mg Q4H PRN   Or     oxyCODONE 10 mg Q4H PRN polyethylene glycol 17 g Daily PRN       Invasive lines and devices:   Invasive Devices     Peripherally Inserted Central Catheter Line            PICC Line 75/69/28 Right Basilic 10 days          Drain            Closed/Suction Drain Left LLQ Bulb 19 Fr  5 days    Closed/Suction Drain Left LUQ Bulb 19 Fr  5 days    Closed/Suction Drain Right RLQ Bulb 19 Fr  5 days    Closed/Suction Drain Right RUQ Accordion 19 Fr  5 days    Ventriculostomy/Subdural Ventricular drainage catheter Right 5 days                Code Status: Level 1 - Full Code        SIGNATURE: Johnnie Canas PA-C  DATE: January 31, 2018  TIME: 7:45 AM

## 2018-01-31 NOTE — SEDATION DOCUMENTATION
Right and Left Chest tubes placed also Perisplenic abscess drain placed by Dr Maia Gonzalez without complications  Cultures sent

## 2018-02-01 PROBLEM — K21.9 GASTROESOPHAGEAL REFLUX DISEASE: Status: ACTIVE | Noted: 2017-12-15

## 2018-02-01 LAB
ANION GAP SERPL CALCULATED.3IONS-SCNC: 4 MMOL/L (ref 4–13)
ANISOCYTOSIS BLD QL SMEAR: PRESENT
BASOPHILS # BLD MANUAL: 0 THOUSAND/UL (ref 0–0.1)
BASOPHILS NFR MAR MANUAL: 0 % (ref 0–1)
BUN SERPL-MCNC: 14 MG/DL (ref 5–25)
CA-I BLD-SCNC: 1.32 MMOL/L (ref 1.12–1.32)
CALCIUM SERPL-MCNC: 8.8 MG/DL (ref 8.3–10.1)
CHLORIDE SERPL-SCNC: 100 MMOL/L (ref 100–108)
CO2 SERPL-SCNC: 33 MMOL/L (ref 21–32)
CREAT SERPL-MCNC: 0.38 MG/DL (ref 0.6–1.3)
EOSINOPHIL # BLD MANUAL: 0.18 THOUSAND/UL (ref 0–0.4)
EOSINOPHIL NFR BLD MANUAL: 1 % (ref 0–6)
ERYTHROCYTE [DISTWIDTH] IN BLOOD BY AUTOMATED COUNT: 15.7 % (ref 11.6–15.1)
GFR SERPL CREATININE-BSD FRML MDRD: 130 ML/MIN/1.73SQ M
GLUCOSE SERPL-MCNC: 177 MG/DL (ref 65–140)
GLUCOSE SERPL-MCNC: 178 MG/DL (ref 65–140)
GLUCOSE SERPL-MCNC: 179 MG/DL (ref 65–140)
GLUCOSE SERPL-MCNC: 194 MG/DL (ref 65–140)
GLUCOSE SERPL-MCNC: 401 MG/DL (ref 65–140)
HCT VFR BLD AUTO: 24.2 % (ref 34.8–46.1)
HGB BLD-MCNC: 7.9 G/DL (ref 11.5–15.4)
LYMPHOCYTES # BLD AUTO: 1.07 THOUSAND/UL (ref 0.6–4.47)
LYMPHOCYTES # BLD AUTO: 6 % (ref 14–44)
MAGNESIUM SERPL-MCNC: 1.9 MG/DL (ref 1.6–2.6)
MCH RBC QN AUTO: 30.5 PG (ref 26.8–34.3)
MCHC RBC AUTO-ENTMCNC: 32.6 G/DL (ref 31.4–37.4)
MCV RBC AUTO: 93 FL (ref 82–98)
METAMYELOCYTES NFR BLD MANUAL: 3 % (ref 0–1)
MONOCYTES # BLD AUTO: 0.54 THOUSAND/UL (ref 0–1.22)
MONOCYTES NFR BLD: 3 % (ref 4–12)
MYELOCYTES NFR BLD MANUAL: 2 % (ref 0–1)
NEUTROPHILS # BLD MANUAL: 15.17 THOUSAND/UL (ref 1.85–7.62)
NEUTS BAND NFR BLD MANUAL: 1 % (ref 0–8)
NEUTS SEG NFR BLD AUTO: 84 % (ref 43–75)
NRBC BLD AUTO-RTO: 0 /100 WBCS
PHOSPHATE SERPL-MCNC: 3.6 MG/DL (ref 2.7–4.5)
PLATELET # BLD AUTO: 650 THOUSANDS/UL (ref 149–390)
PLATELET BLD QL SMEAR: ABNORMAL
PMV BLD AUTO: 8.8 FL (ref 8.9–12.7)
POLYCHROMASIA BLD QL SMEAR: PRESENT
POTASSIUM SERPL-SCNC: 4.6 MMOL/L (ref 3.5–5.3)
RBC # BLD AUTO: 2.59 MILLION/UL (ref 3.81–5.12)
RBC MORPH BLD: PRESENT
SODIUM SERPL-SCNC: 137 MMOL/L (ref 136–145)
WBC # BLD AUTO: 17.85 THOUSAND/UL (ref 4.31–10.16)

## 2018-02-01 PROCEDURE — 99024 POSTOP FOLLOW-UP VISIT: CPT | Performed by: SURGERY

## 2018-02-01 PROCEDURE — 99024 POSTOP FOLLOW-UP VISIT: CPT | Performed by: PHYSICIAN ASSISTANT

## 2018-02-01 PROCEDURE — 99233 SBSQ HOSP IP/OBS HIGH 50: CPT | Performed by: INTERNAL MEDICINE

## 2018-02-01 PROCEDURE — 83735 ASSAY OF MAGNESIUM: CPT | Performed by: PHYSICIAN ASSISTANT

## 2018-02-01 PROCEDURE — 84100 ASSAY OF PHOSPHORUS: CPT | Performed by: PHYSICIAN ASSISTANT

## 2018-02-01 PROCEDURE — G8982 BODY POS GOAL STATUS: HCPCS

## 2018-02-01 PROCEDURE — 99252 IP/OBS CONSLTJ NEW/EST SF 35: CPT | Performed by: THORACIC SURGERY (CARDIOTHORACIC VASCULAR SURGERY)

## 2018-02-01 PROCEDURE — C9113 INJ PANTOPRAZOLE SODIUM, VIA: HCPCS | Performed by: NURSE PRACTITIONER

## 2018-02-01 PROCEDURE — 82330 ASSAY OF CALCIUM: CPT | Performed by: NURSE PRACTITIONER

## 2018-02-01 PROCEDURE — 97163 PT EVAL HIGH COMPLEX 45 MIN: CPT

## 2018-02-01 PROCEDURE — 85027 COMPLETE CBC AUTOMATED: CPT | Performed by: PHYSICIAN ASSISTANT

## 2018-02-01 PROCEDURE — G8981 BODY POS CURRENT STATUS: HCPCS

## 2018-02-01 PROCEDURE — 82948 REAGENT STRIP/BLOOD GLUCOSE: CPT

## 2018-02-01 PROCEDURE — 85007 BL SMEAR W/DIFF WBC COUNT: CPT | Performed by: PHYSICIAN ASSISTANT

## 2018-02-01 PROCEDURE — 80048 BASIC METABOLIC PNL TOTAL CA: CPT | Performed by: PHYSICIAN ASSISTANT

## 2018-02-01 RX ORDER — HALOPERIDOL 5 MG/ML
2 INJECTION INTRAMUSCULAR
Status: DISCONTINUED | OUTPATIENT
Start: 2018-02-01 | End: 2018-02-05

## 2018-02-01 RX ORDER — MAGNESIUM SULFATE HEPTAHYDRATE 40 MG/ML
2 INJECTION, SOLUTION INTRAVENOUS ONCE
Status: COMPLETED | OUTPATIENT
Start: 2018-02-01 | End: 2018-02-01

## 2018-02-01 RX ORDER — GLYCOPYRROLATE 0.2 MG/ML
0.2 INJECTION INTRAMUSCULAR; INTRAVENOUS EVERY 4 HOURS PRN
Status: DISCONTINUED | OUTPATIENT
Start: 2018-02-01 | End: 2018-02-05

## 2018-02-01 RX ORDER — LIDOCAINE 50 MG/G
2 PATCH TOPICAL DAILY
Status: DISCONTINUED | OUTPATIENT
Start: 2018-02-01 | End: 2018-02-06

## 2018-02-01 RX ORDER — LORAZEPAM 2 MG/ML
1 INJECTION INTRAMUSCULAR
Status: DISCONTINUED | OUTPATIENT
Start: 2018-02-01 | End: 2018-02-05

## 2018-02-01 RX ADMIN — Medication 3 G: at 17:00

## 2018-02-01 RX ADMIN — INSULIN LISPRO 1 UNITS: 100 INJECTION, SOLUTION INTRAVENOUS; SUBCUTANEOUS at 01:29

## 2018-02-01 RX ADMIN — FLUCONAZOLE 400 MG: 2 INJECTION INTRAVENOUS at 17:00

## 2018-02-01 RX ADMIN — INSULIN LISPRO 1 UNITS: 100 INJECTION, SOLUTION INTRAVENOUS; SUBCUTANEOUS at 18:12

## 2018-02-01 RX ADMIN — Medication 3 G: at 09:16

## 2018-02-01 RX ADMIN — HYDROMORPHONE HYDROCHLORIDE 1 MG: 1 INJECTION, SOLUTION INTRAMUSCULAR; INTRAVENOUS; SUBCUTANEOUS at 10:48

## 2018-02-01 RX ADMIN — HYDROMORPHONE HYDROCHLORIDE 0.5 MG: 1 INJECTION, SOLUTION INTRAMUSCULAR; INTRAVENOUS; SUBCUTANEOUS at 05:07

## 2018-02-01 RX ADMIN — HYDROMORPHONE HYDROCHLORIDE 1 MG: 1 INJECTION, SOLUTION INTRAMUSCULAR; INTRAVENOUS; SUBCUTANEOUS at 00:12

## 2018-02-01 RX ADMIN — INSULIN LISPRO 5 UNITS: 100 INJECTION, SOLUTION INTRAVENOUS; SUBCUTANEOUS at 06:09

## 2018-02-01 RX ADMIN — HYDROMORPHONE HYDROCHLORIDE 1 MG: 1 INJECTION, SOLUTION INTRAMUSCULAR; INTRAVENOUS; SUBCUTANEOUS at 14:21

## 2018-02-01 RX ADMIN — Medication 3 G: at 21:22

## 2018-02-01 RX ADMIN — HYDROMORPHONE HYDROCHLORIDE 1 MG: 1 INJECTION, SOLUTION INTRAMUSCULAR; INTRAVENOUS; SUBCUTANEOUS at 23:27

## 2018-02-01 RX ADMIN — THIAMINE HYDROCHLORIDE: 100 INJECTION, SOLUTION INTRAMUSCULAR; INTRAVENOUS at 22:25

## 2018-02-01 RX ADMIN — HEPARIN SODIUM 5000 UNITS: 5000 INJECTION, SOLUTION INTRAVENOUS; SUBCUTANEOUS at 14:21

## 2018-02-01 RX ADMIN — LIDOCAINE 2 PATCH: 50 PATCH TOPICAL at 10:47

## 2018-02-01 RX ADMIN — PANTOPRAZOLE SODIUM 40 MG: 40 INJECTION, POWDER, FOR SOLUTION INTRAVENOUS at 09:16

## 2018-02-01 RX ADMIN — HYDROMORPHONE HYDROCHLORIDE 1 MG: 1 INJECTION, SOLUTION INTRAMUSCULAR; INTRAVENOUS; SUBCUTANEOUS at 07:26

## 2018-02-01 RX ADMIN — HYDROMORPHONE HYDROCHLORIDE 1 MG: 1 INJECTION, SOLUTION INTRAMUSCULAR; INTRAVENOUS; SUBCUTANEOUS at 03:35

## 2018-02-01 RX ADMIN — HEPARIN SODIUM 5000 UNITS: 5000 INJECTION, SOLUTION INTRAVENOUS; SUBCUTANEOUS at 21:22

## 2018-02-01 RX ADMIN — HYDROMORPHONE HYDROCHLORIDE 0.5 MG: 1 INJECTION, SOLUTION INTRAMUSCULAR; INTRAVENOUS; SUBCUTANEOUS at 01:31

## 2018-02-01 RX ADMIN — HEPARIN SODIUM 5000 UNITS: 5000 INJECTION, SOLUTION INTRAVENOUS; SUBCUTANEOUS at 05:07

## 2018-02-01 RX ADMIN — KETAMINE HYDROCHLORIDE 0.2 MG/KG/HR: 50 INJECTION, SOLUTION INTRAMUSCULAR; INTRAVENOUS at 12:12

## 2018-02-01 RX ADMIN — Medication 3 G: at 03:35

## 2018-02-01 RX ADMIN — INSULIN LISPRO 1 UNITS: 100 INJECTION, SOLUTION INTRAVENOUS; SUBCUTANEOUS at 12:16

## 2018-02-01 RX ADMIN — MAGNESIUM SULFATE HEPTAHYDRATE 2 G: 40 INJECTION, SOLUTION INTRAVENOUS at 07:30

## 2018-02-01 RX ADMIN — HYDROMORPHONE HYDROCHLORIDE 1 MG: 1 INJECTION, SOLUTION INTRAMUSCULAR; INTRAVENOUS; SUBCUTANEOUS at 20:37

## 2018-02-01 NOTE — PHYSICAL THERAPY NOTE
PT EVALUATION     02/01/18 1537   Note Type   Note type Eval only   Pain Assessment   Pain Assessment 0-10   Pain Score Worst Possible Pain   Pain Type Acute pain;Surgical pain   Pain Location Abdomen   Pain Orientation Bilateral   Home Living   Type of [address] One level  (2 [place] )   Additional Comments PATIENT DENIES DME USE PRIOR TO ADMISSION    Prior Function   Level of [place] Independent with ADLs and functional mobility   Lives With Son  Darinel )   Receives Help From Family   ADL Assistance Independent   IADLs Independent   Falls in the last 6 months 0   Restrictions/Precautions   Weight Bearing Precautions Per Order No   Braces or Orthoses (NONE)   Other Precautions Fall Risk;Pain;Telemetry;Multiple lines   General   Family/Caregiver Present Yes  (MOM AND SISTER)   Cognition   Overall Cognitive Status WFL   RUE Assessment   RUE Assessment X   RUE Strength   RUE Overall Strength Due to pain   LUE Assessment   LUE Assessment X   LUE Strength   LUE Overall Strength Due to pain   RLE Assessment   RLE Assessment X   Strength RLE   RLE Overall Strength 2+/5   LLE Assessment   LLE Assessment X   Strength LLE   LLE Overall Strength 2+/5   Bed Mobility   Rolling R 2  Maximal assistance   Additional items Assist x 1;Bedrails; Increased time required;Verbal cues   Rolling L 2  Maximal assistance   Additional items Assist x 1; Increased time required; Bedrails;Verbal cues   Supine to Sit Unable to assess   Sit to Supine Unable to assess   Additional Comments PATIENT IN A LOT OF PAIN AND REFUSING TO DO MORE THAN ROLL    Transfers   Sit to Stand Unable to assess   Stand to Sit Unable to assess   Ambulation/Elevation   Gait pattern Not tested   Stair Management Assistance Not tested   Endurance Deficit   Endurance Deficit Yes   Endurance Deficit Description PAIN, GROSS EDEMA, AND DECREASED ACTIVITY TOLERANCE    Activity Tolerance   Activity Tolerance Patient limited by pain;Treatment limited secondary to medical complications (Comment)   Nurse Made Aware RN MANDIE BILLINGSLEY    Assessment   Prognosis Good   Problem List Decreased strength;Decreased range of motion;Decreased endurance; Impaired balance;Decreased mobility;Pain   Assessment PT COMPLETED EVALUATION OF 36YEAR OLD FEMALE DIRECTLY ADMITTED TO [facility] ICU FROM [address] [place] [address] ON 1/24/18 FOR ESOPHAGEAL STENT PLACEMENT  PATIENT UNDERWENT A HIATAL HERNIA REPAIR ON 1/11 AND PRESENTED TO THE ED OF [facility] ON 1/14 WITH PERITONITIS AND SEPTIC SHOCK  AT [facility], PATIENT UNDERWENT "Externalization of right-sided SHUNT VENTRICULAR-PERITONEAL in anterior chest wall ribs two and three level" ON 1/25/18  CURRENT DIAGNOSES INCLUDE GASTRIC PERFORATION, SEPTIC SHOCK, AND INTRA-ABDOMINAL/PELVIC ABSCESSES  CURRENT MEDICAL AND PHYSICAL INSTABILITIES INCLUDE PAIN, MULTIPLE LINES, TELEMETRY MONITORING, NG TUBE, FALLS RISK, AND A REGRESSION IN FUNCTIONAL STATUS FROM BASELINE  PMH IS SIGNIFICANT FOR HYSTERECTOMY,  SHUNT REVISION, AND GASTRIC SLEEVE  PRIOR TO THIS ADMISSION, PATIENT RESIDED IN 1 [facility] (2 [facility]) WITH HER FIANCE AND SON AND WAS PREVIOUSLY INDEPENDENT WITH ADLS, IADLS, AND MOBILITY  CURRENT IMPAIRMENTS INCLUDE PAIN, DECREASED STRENGTH, DECREASED RANGE OF MOTION, DECREASED ACTIVITY TOLERANCE, AND FALLS RISK  DURING PT EVALUATION, PATIENT WAS IN A LOT OF PAIN, AND REFUSED TO PARTICIPATE IN MORE THAN A BED LEVEL ASSESSMENT  PATIENT IS GROSSLY WEAK, SECONDARY TO PAIN AND EDEMA  PATIENT REQUIRED MAX-A X1 TO ROLL TO THE LEFT AND THE RIGHT IN BED  NEXT SESSION, PLAN TO SIT EDGE OF BED AND TRIAL AMBULATION  AT THIS POINT IN TIME PATIENT WOULD BENEFIT FROM D/C TO STR WHEN MEDICALLY APPROPRIATE, BUT PATIENT CANNOT BE CLEARED FOR D/C UNTIL TRANSFERS AND AMBULATION ARE ASSESSED  PATIENT WOULD ALSO BENEFIT FROM CONTINUED SKILLED INPT PT THIS ADMISSION TO ACHIEVE MAXIMAL FUNCTION AND SAFETY  Barriers to Discharge Decreased caregiver support; Inaccessible home environment   Goals   Patient Goals TO HAVE LESS PAIN    LTG Expiration Date 02/15/18   Long Term Goal #1 10-14 DAYS: 1) INCREASE B/L LE STRENGTH BY 1/2 GRADE; 2) INCREASE B/L UE STRENGTH BY 1/2 GRADE; 3) MOD-I TO ROLL L & R; 4) TOLERATE CHAIR POSITION FOR 30 MINUTES; 5) INCREASE ACTIVITY TOLERANCE TO 30 MINUTES    Treatment Day 0   Plan   Treatment/Interventions Functional transfer training;LE strengthening/ROM; Therapeutic exercise; Endurance training;Bed mobility;Spoke to nursing   PT Frequency 5x/wk   Recommendation   Recommendation Short-term skilled PT   Equipment Recommended (R/O LEAST RESTRICTIVE AD )   PT - OK to Discharge No  (REQUIRES FURTHER ASSESSMENT OF TRANSFERS AND AMBULATION )   Barthel Index   Feeding 0   Bathing 0   Grooming Score 0   Dressing Score 0   Bladder Score 0   Bowels Score 10   Toilet Use Score 0   Transfers (Bed/Chair) Score 0   Mobility (Level Surface) Score 0   Stairs Score 0   Barthel Index Score 10     Heather Pierce, SPT

## 2018-02-01 NOTE — PLAN OF CARE
Problem: PHYSICAL THERAPY ADULT  Goal: Performs mobility at highest level of function for planned discharge setting  See evaluation for individualized goals  Treatment/Interventions: Functional transfer training, LE strengthening/ROM, Therapeutic exercise, Endurance training, Bed mobility, Spoke to nursing  Equipment Recommended:  (R/O LEAST RESTRICTIVE AD )       See flowsheet documentation for full assessment, interventions and recommendations  Prognosis: Good  Problem List: Decreased strength, Decreased range of motion, Decreased endurance, Impaired balance, Decreased mobility, Pain  Assessment: PT COMPLETED EVALUATION OF 36YEAR OLD FEMALE DIRECTLY ADMITTED TO [facility] ICU FROM [address] [place] [address] ON 1/24/18 FOR ESOPHAGEAL STENT PLACEMENT  PATIENT UNDERWENT A HIATAL HERNIA REPAIR ON 1/11 AND PRESENTED TO THE ED OF [place] ON 1/14 WITH PERITONITIS AND SEPTIC SHOCK  AT [facility], PATIENT UNDERWENT "Externalization of right-sided SHUNT VENTRICULAR-PERITONEAL in anterior chest wall ribs two and three level" ON 1/25/18  CURRENT DIAGNOSES INCLUDE GASTRIC PERFORATION, SEPTIC SHOCK, AND INTRA-ABDOMINAL/PELVIC ABSCESSES  CURRENT MEDICAL AND PHYSICAL INSTABILITIES INCLUDE PAIN, MULTIPLE LINES, TELEMETRY MONITORING, NG TUBE, FALLS RISK, AND A REGRESSION IN FUNCTIONAL STATUS FROM BASELINE  PMH IS SIGNIFICANT FOR HYSTERECTOMY,  SHUNT REVISION, AND GASTRIC SLEEVE  PRIOR TO THIS ADMISSION, PATIENT RESIDED IN [facility] ([facility]) WITH HER FIANCE AND SON AND WAS PREVIOUSLY INDEPENDENT WITH ADLS, IADLS, AND MOBILITY  CURRENT IMPAIRMENTS INCLUDE PAIN, DECREASED STRENGTH, DECREASED RANGE OF MOTION, DECREASED ACTIVITY TOLERANCE, AND FALLS RISK  DURING PT EVALUATION, PATIENT WAS IN A LOT OF PAIN, AND REFUSED TO PARTICIPATE IN MORE THAN A BED LEVEL ASSESSMENT  PATIENT IS GROSSLY WEAK, SECONDARY TO PAIN AND EDEMA  PATIENT REQUIRED MAX-A X1 TO ROLL TO THE LEFT AND THE RIGHT IN BED   NEXT SESSION, PLAN TO SIT EDGE OF BED AND TRIAL AMBULATION  AT THIS POINT IN TIME PATIENT WOULD BENEFIT FROM D/C TO STR WHEN MEDICALLY APPROPRIATE, BUT PATIENT CANNOT BE CLEARED FOR D/C UNTIL TRANSFERS AND AMBULATION ARE ASSESSED  PATIENT WOULD ALSO BENEFIT FROM CONTINUED SKILLED INPT PT THIS ADMISSION TO ACHIEVE MAXIMAL FUNCTION AND SAFETY  Barriers to Discharge: Decreased caregiver support, Inaccessible home environment     Recommendation: (S) Short-term skilled PT     PT - OK to Discharge: (S) No (REQUIRES FURTHER ASSESSMENT OF TRANSFERS AND AMBULATION )    See flowsheet documentation for full assessment

## 2018-02-01 NOTE — PROGRESS NOTES
Progress Note - Critical Care   Shereen Babcock 36 y o  female MRN: [id number]  Unit/Bed#: ICU 10 Encounter: [id number]    Assessment: 35 y/o female with complicated PMHx with hydrocephalus with  shunt, Hx Prerna en y, and recent hernia repair who presents s/p ex-lap x2 and externalization of  shunt  Found to have further anastamotic leak on CT scan unamenable to further stenting  IR for drainage of abdominal collection, bilateral thoracentesis with placement of B/L CT and keofed placement  Per GI strict NPO will need to allow leak to heal with repeat CT and esophagram in next few weeks      Principal Problem:    Gastric leak  Active Problems:    Acute peritonitis    Idiopathic intracranial hypertension    S/P  shunt     (ventriculoperitoneal) shunt status    Murmur, cardiac    Refusal of blood product  Resolved Problems:    Peritonitis    Plan:   · Neuro: GCS 15 AAOx3  · Analgesia: PRN dilaudid 0 5/1mg Q3hr PRN  · Requiring medication consistently for severe pain, consider standing dose 0 5mg Dilaudid Q3hr with 1mg for breakthrough pain   · Sedation: None    ·  shunt 2/2 pseudotumor cerebri externalized 1/25  · EVD Clamped yesterday, will f/u neurosurgery recs for possible removal today, exam stable   · CSF Cx with no growth to date   · CV:   · Hemodynamically stable, no further pressor requirements  · Murmur: systolic murmur grade 3 auscultated on exam, TTE inconclusive, will defer KACIE for now as patient with GE anastomotic leak   · Lung:   · 2L O2 by nasal cannula, maintain O2 sat > 92%  · B/L Pleural Effusions: chest tubes placed in IR yesterday, continue to suction   · Right  mL serous drainage  · Left CT 980mL serous drainage   · GI:   · Peritonitis 2/2 GE junction leak   · S/p EGD with stent, ex-lap x2 with drain placement   · LUQ drain placed yesterday in IR 130cc of dark pus initially expressed  · Drains  · LLQ: 27mL serous   · LUQ: 13 mL serous  · RLQ: 34 mL serous  · RUQ: 45 mL serosanguinous  · Perisplenic: 365, brown thick drainage  · Prophylaxis: Protonix   · Bowel Regimen: Sennakot, PRN Miralax    · FEN:   · Fluids:    · Electrolytes - relative hypomagnesemia will replete   · Nutrition: will re-order TPN, strict NPO currently   · :   · No active issues, continue to trend Cr  · UOP 0 9cc/kg/hr   · ID:   · Abdominal collection: continue Unasyn day 6 and Fluconazole day 2  · Per ID will likely need antimicrobial therapy x4-6 wks   · Leukocytosis stable, persistently afebrile   · Cultures   · Perisplenic drain culture pending, no growth to date   · Initial abdominal wound culture positive for enterococcus and yeast   · Heme:   · Hgb 7 9 from 9, possibly dilutional will d/c IVF and trend HH   · Blood management program   · PPX: SQH  · Endo:   · Hyperglycemia: SSI, will increase to algorithm 3  · Msk/Skin:   · OOB to chair as tolerated   · Disposition: ICU level care     ______________________________________________________________________    HPI/24hr events: Went to IR for placement of keofed under flouroscopy, B/L chest tube placement, LUQ drain placement which expressed 130cc of brown pus  Per GI strict NPO with TPN for nutrition to allow healing of the GE junction leak  Lines  Right Basillic PICC     Infusions    ______________________________________________________________________  Physical Exam:  Curran Agitation Sedation Scale (RASS): Light sedation  Physical Exam   Constitutional: She is oriented to person, place, and time  She appears well-developed and well-nourished  HENT:   Head: Normocephalic and atraumatic  Eyes: EOM are normal  Pupils are equal, round, and reactive to light  No scleral icterus  Neck: Normal range of motion  Neck supple  No JVD present  Cardiovascular: Normal rate and regular rhythm  Murmur (grade 3 systolic ejection murmur ) heard    Pulmonary/Chest: Effort normal and breath sounds normal    Decreased breath sounds throughout  Diffuse chest wall tenderness     Abdominal: Soft  She exhibits no distension  There is tenderness  4 abdominal drains placed in RUQ, RLQ, LLQ, LUQ  Perisplenic drain in place   Musculoskeletal: Normal range of motion  She exhibits edema (1+ pedal edema)  Neurological: She is alert and oriented to person, place, and time  Skin: Skin is warm and dry  No erythema      ______________________________________________________________________  Temperature:   Temp (24hrs), Av 2 °F (36 8 °C), Min:97 7 °F (36 5 °C), Max:98 6 °F (37 °C)    Current Temperature: 98 6 °F (37 °C)    Vitals:    18 0402 18 0507 18 0537 18 0545   BP:  149/77 154/76    BP Location:       Pulse: 88 86 86    Resp: 17 16 12    Temp:       TempSrc:       SpO2: 98% 99% 98%    Weight:    70 7 kg (155 lb 13 8 oz)   Height:         Arterial Line BP: 166/82       Weights:   IBW: 54 7 kg    Body mass index is 26 75 kg/m²  Weight (last 2 days)     Date/Time   Weight    18 0545  70 7 (155 87)            Height: 5' 4" (162 6 cm)  Hemodynamic Monitoring:  N/A       Ventilator Settings:  2L by nasal cannula   No results found for: PHART, CYO4NME, PO2ART, TAH4OCD, L7HOQHBD, BEART, SOURCE    Intake and Outputs:  I/O       701 -  0700 701 -  07    P  O  1990     I V  (mL/kg) 1869 6 (26 5) 2774 3 (39 2)    NG/GT  0    IV Piggyback 350 600     2 1176 4    Total Intake(mL/kg) 4630 8 (65 6) 4550 7 (64 4)    Urine (mL/kg/hr) 1450 (0 9) 1538 (0 9)    Emesis/NG output  0 (0)    Drains 353 (0 2) 450 (0 3)    Chest Tube  1735 (1)    Total Output 1803 3723    Net +2827 8 +827 7          Unmeasured Urine Occurrence 1 x 2 x        UOP: 0 9cc/kg/hour   Nutrition:        Diet Orders            Start     Ordered    18 170  Diet NPO  Diet effective now     Comments:  NOTHING NPO   Question Answer Comment   Diet Type NPO    RD to adjust diet per protocol?  No        18          Labs: Results from last 7 days  Lab Units 02/01/18  0517 01/31/18  0436 01/30/18  0441 01/29/18  0501   WBC Thousand/uL 17 85* 23 57* 32 71* 25 53*   HEMOGLOBIN g/dL 7 9* 9 0* 9 9* 10 1*   HEMATOCRIT % 24 2* 27 0* 29 3* 30 9*   PLATELETS Thousands/uL 650* 711* 795* 753*   MONO PCT MAN %  --  2* 3* 4      Results from last 7 days  Lab Units 02/01/18  0517 01/31/18  0436 01/30/18  0441   SODIUM mmol/L 137 135* 133*   POTASSIUM mmol/L 4 6 3 4* 3 9   CHLORIDE mmol/L 100 96* 95*   CO2 mmol/L 33* 32 30   BUN mg/dL 14 16 21   CREATININE mg/dL 0 38* 0 31* 0 37*   CALCIUM mg/dL 8 8 9 8 10 3*   GLUCOSE RANDOM mg/dL 401* 127 73       Results from last 7 days  Lab Units 02/01/18  0517 01/31/18  0436 01/30/18  0441   MAGNESIUM mg/dL 1 9 2 0 1 8       Results from last 7 days  Lab Units 02/01/18  0517 01/31/18  0436 01/30/18  0441   PHOSPHORUS mg/dL 3 6 3 7 3 7            Results from last 7 days  Lab Units 01/25/18  2118   LACTIC ACID mmol/L 1 3     No results found for: TROPONINI  Imaging:   CT chest abd/pelvis: There is extravasation of contrast in the upper abdomen in the periesophageal region with tracking of contrast in the perisplenic fluid collection and in the upper abdomen  Suggest persistent leak  This leak is better demonstrated on the barium swallow   performed on January 28, 2018  Multiple intra-abdominal fluid collections are decreasing in size  Bilateral large effusions with compressive atelectasis      I have personally reviewed pertinent reports  and I have personally reviewed pertinent films in PACS  EKG: NSR   Micro:  Blood Culture: No results found for: BLOODCX  Urine Culture: No results found for: URINECX  Sputum Culture: No components found for: SPUTUMCX  Wound Culure:   Lab Results   Component Value Date    WOUNDCULT 2+ Growth of Enterococcus faecalis (A) 01/25/2018       Lab Results   Component Value Date    WOUNDCULT 2+ Growth of Enterococcus faecalis (A) 01/25/2018     Allergies:    Allergies Allergen Reactions    Benadryl [Diphenhydramine] Swelling    Phenergan [Promethazine] Hives     Medications:   Scheduled Meds:  Current Facility-Administered Medications:  Adult TPN (CUSTOM BASE/CUSTOM ELECTROLYTE)  Intravenous Continuous Heather Frees, CRNP Last Rate: 69 9 mL/hr at 01/31/18 2200   ampicillin-sulbactam 3 g Intravenous Q6H Ward Argueta MD Last Rate: 3 g (02/01/18 0335)   bisacodyl 10 mg Rectal Daily PRN Heather Frees, CRNP    fluconazole 400 mg Intravenous Q24H Heather Frees, CRNP Last Rate: Stopped (01/31/18 1801)   heparin (porcine) 5,000 Units Subcutaneous Q8H [address] AB[place] Tse    HYDROmorphone 0 5 mg Intravenous Q3H PRN Crystal Armenta PA-C    HYDROmorphone 1 mg Intravenous Q3H PRN Alphonse Alexis MD    insulin lispro 1-5 Units Subcutaneous Q6H [address] Patrick Paul MD    iohexol 50 mL Oral 90 min pre-procedure Abelardo Hurley MD    pantoprazole 40 mg Intravenous Q24H [address] Tse    sodium chloride 125 mL/hr Intravenous Continuous Staci Carbone PA-C Last Rate: 125 mL/hr (01/31/18 2202)     Continuous Infusions:  Adult TPN (CUSTOM BASE/CUSTOM ELECTROLYTE)  Last Rate: 69 9 mL/hr at 01/31/18 2200   sodium chloride 125 mL/hr Last Rate: 125 mL/hr (01/31/18 2202)     PRN Meds:    bisacodyl 10 mg Daily PRN   HYDROmorphone 0 5 mg Q3H PRN   HYDROmorphone 1 mg Q3H PRN     VTE Pharmacologic Prophylaxis: Heparin  VTE Mechanical Prophylaxis: sequential compression device  Invasive lines and devices:   Invasive Devices     Peripherally Inserted Central Catheter Line            PICC Line 28/62/61 Right Basilic 11 days          Drain            Closed/Suction Drain Left LLQ Bulb 19 Fr  6 days    Closed/Suction Drain Left LUQ Bulb 19 Fr  6 days    Closed/Suction Drain Right RLQ Bulb 19 Fr  6 days    Closed/Suction Drain Right RUQ Accordion 19 Fr  6 days    Ventriculostomy/Subdural Ventricular drainage catheter Right 6 days    Chest Tube 1 Right Pleural 10 Fr  less than 1 day    Chest Tube 2 Left Pleural 10 Fr  less than 1 day    Closed/Suction Drain Left Abdomen Bulb 12 Fr  less than 1 day    NG/OG/Enteral Tube Enteral Feeding Tube Right nares less than 1 day                   Code Status: Level 1 - Full Code    Portions of the record may have been created with voice recognition software  Occasional wrong word or "sound a like" substitutions may have occurred due to the inherent limitations of voice recognition software  Read the chart carefully and recognize, using context, where substitutions have occurred      Rojsa Andrew PA-C

## 2018-02-01 NOTE — PROGRESS NOTES
Progress Note - Genral Surgery   Hershal American 36 y o  female MRN: [id number]  Unit/Bed#: ICU 10 Encounter: [id number]    Assessment:  42F with gastric perforation s/p lap HHR at OSH, s/p esophageal stent placement, ex-lap and washout  She has evidence of persistent leak on imaging yesterday which appears to be a delayed leak 2/2 reflux  Also with bilateral pleural effusions and large perisplenic fluid collections      -all drains and chest tubes are serous except Left perigastric drain   -L perigastric/splenic drain 360 dark brown  -afebrile  -on unasyn, diffucan     Plan:    Antibiotics per ID- Unasyn & diflucan  -keep chest tubes to suction for another 24h  -TPN   -strict NPO      Subjective/Objective   Chief Complaint: None    Subjective:     Objective:     Blood pressure 153/87, pulse 90, temperature 98 6 °F (37 °C), temperature source Oral, resp  rate (!) 27, height 5' 4" (1 626 m), weight 70 7 kg (155 lb 13 8 oz), SpO2 98 %, not currently breastfeeding  ,Body mass index is 26 75 kg/m²        Intake/Output Summary (Last 24 hours) at 02/01/18 0905  Last data filed at 02/01/18 1403   Gross per 24 hour   Intake          5095 75 ml   Output             4437 ml   Net           658 75 ml       Invasive Devices     Peripherally Inserted Central Catheter Line            PICC Line 03/75/29 Right Basilic 11 days          Drain            Closed/Suction Drain Left LLQ Bulb 19 Fr  6 days    Closed/Suction Drain Left LUQ Bulb 19 Fr  6 days    Closed/Suction Drain Right RLQ Bulb 19 Fr  6 days    Closed/Suction Drain Right RUQ Accordion 19 Fr  6 days    Ventriculostomy/Subdural Ventricular drainage catheter Right 6 days    Chest Tube 1 Right Pleural 10 Fr  1 day    Chest Tube 2 Left Pleural 10 Fr  1 day    Closed/Suction Drain Left Abdomen Bulb 12 Fr  1 day    NG/OG/Enteral Tube Enteral Feeding Tube Right nares less than 1 day                Physical Exam:     Diffusely tener  LUQ drain gastric contesnts      Lab, Imaging and other studies:  CBC:   Lab Results   Component Value Date    WBC 17 85 (H) 02/01/2018    HGB 7 9 (L) 02/01/2018    HCT 24 2 (L) 02/01/2018    MCV 93 02/01/2018     (H) 02/01/2018    MCH 30 5 02/01/2018    MCHC 32 6 02/01/2018    RDW 15 7 (H) 02/01/2018    MPV 8 8 (L) 02/01/2018    NRBC 0 02/01/2018   , CMP:   Lab Results   Component Value Date     02/01/2018    K 4 6 02/01/2018     02/01/2018    CO2 33 (H) 02/01/2018    ANIONGAP 4 02/01/2018    BUN 14 02/01/2018    CREATININE 0 38 (L) 02/01/2018    GLUCOSE 401 (H) 02/01/2018    CALCIUM 8 8 02/01/2018    EGFR 130 02/01/2018     VTE Pharmacologic Prophylaxis: Heparin  VTE Mechanical Prophylaxis: sequential compression device

## 2018-02-01 NOTE — PROGRESS NOTES
Progress Note - Thoracic Surgery   Akila Perez 36 y o  female MRN: [id number]  Unit/Bed#: ICU 10 Encounter: [id number]    Assessment:  42F with gastric perforation s/p lap HHR at OSH, s/p esophageal stent placement, ex-lap and washout  She has evidence of persistent leak on imaging yesterday which appears to be a delayed leak 2/2 reflux  Also with bilateral pleural effusions and large perisplenic fluid collections  - R  cc, L  cc, both serous    Plan:  Continue CT to suction b/l  Antibiotics per ID- Unasyn & diflucan  TPN to allow for healing of leak  Post pyloric keofed placed- however GI recommends against TF due to leak and possible reflux  Monitor output of drains    Subjective/Objective   Chief Complaint: None    Subjective: No complaints, but appears discouraged  Is s/p IR placement b/l chest tubes and 12 Fr drain in L perisplenic collection  Objective:     Blood pressure 156/81, pulse 90, temperature 98 6 °F (37 °C), temperature source Oral, resp  rate (!) 24, height 5' 4" (1 626 m), weight 70 7 kg (155 lb 13 8 oz), SpO2 99 %, not currently breastfeeding  ,Body mass index is 26 75 kg/m²        Intake/Output Summary (Last 24 hours) at 02/01/18 8587  Last data filed at 02/01/18 0600   Gross per 24 hour   Intake          5095 75 ml   Output             4167 ml   Net           928 75 ml       Invasive Devices     Peripherally Inserted Central Catheter Line            PICC Line 95/28/11 Right Basilic 11 days          Drain            Closed/Suction Drain Left LLQ Bulb 19 Fr  6 days    Closed/Suction Drain Left LUQ Bulb 19 Fr  6 days    Closed/Suction Drain Right RLQ Bulb 19 Fr  6 days    Closed/Suction Drain Right RUQ Accordion 19 Fr  6 days    Ventriculostomy/Subdural Ventricular drainage catheter Right 6 days    Chest Tube 1 Right Pleural 10 Fr  less than 1 day    Chest Tube 2 Left Pleural 10 Fr  less than 1 day    Closed/Suction Drain Left Abdomen Bulb 12 Fr  less than 1 day    NG/OG/Enteral Tube Enteral Feeding Tube Right nares less than 1 day                Physical Exam:   Gen: A&O, NAD  Cardio: RRR  Lungs: CTAB  CT x 2, both serous output, to suction w/o AL  Abd: Soft, non distended, slightly tender  ADENIKE x 4 to abdomen, serous  New 12Fr IR drain to L flank, dark brown       Lab, Imaging and other studies:  CBC:   Lab Results   Component Value Date    WBC 17 85 (H) 02/01/2018    HGB 7 9 (L) 02/01/2018    HCT 24 2 (L) 02/01/2018    MCV 93 02/01/2018     (H) 02/01/2018    MCH 30 5 02/01/2018    MCHC 32 6 02/01/2018    RDW 15 7 (H) 02/01/2018    MPV 8 8 (L) 02/01/2018   , CMP:   Lab Results   Component Value Date     02/01/2018    K 4 6 02/01/2018     02/01/2018    CO2 33 (H) 02/01/2018    ANIONGAP 4 02/01/2018    BUN 14 02/01/2018    CREATININE 0 38 (L) 02/01/2018    GLUCOSE 401 (H) 02/01/2018    CALCIUM 8 8 02/01/2018    EGFR 130 02/01/2018     VTE Pharmacologic Prophylaxis: Heparin  VTE Mechanical Prophylaxis: sequential compression device

## 2018-02-01 NOTE — PROGRESS NOTES
Progress Note - Neurosurgery   Erica Dumas 36 y o  female MRN: [id number]  Unit/Bed#: ICU 10 Encounter: [id number]    Assessment:  1  Procedure day 7 externalization of right-sided ventriculoperitoneal shunt  2  History of ventriculoperitoneal shunt for idiopathic intracranial hypertension (originally placed May 30, 2017)  3  Left upper extremity occlusive thrombophlebitis cephalic vein  4  Peritonitis   5  Gastric perforation status post repair    Plan:  65-year-old female transfer [facility] for esophageal stent status post upper gastric injury during laparoscopic paraesophageal hernia repair  Patient developed sepsis and required multiple operative procedures  Upon transfer to [facility], she underwent a esophageal stent and externalization ventriculoperitoneal shunt  · Exam reveals diffuse weakness  Alert and slow to answer questions  Following commands  Dressing intact  · Imaging reviewed personally and by attending  Final results as below  · Shunt series:  Intact catheter with Codman Hakim shunt set to 70mm H2O  · Externalized shunt clamped  Monitor exam    · CSF sent - Glucose 56, RBC 0, Protein 25, WBC 0, gram stain no bacteria, culture preliminary no growth  · Considering removal of shunt if tolerates clamp  · Tentative plan for tying off shunt at the level of the chest as patient without neurological changes or visual complaints  · Recommend ophthalmology evaluation  Pending   · Pain control per primary team   · Mobilize daily  · SSC following -  ADENIKE drains x5  Trend white count 17 85 (23 57)  · Repeat CT scan CAP w 1/30/18 with persistent leak  · Status post IR perisplenic drain placement bilateral chest tubes for pleural effusion along with Keofed placement  GI recommend defer use of Keofed and recommend continue TPN  · Chest tube placed to water seal  · Infectious disease following - continue on IV Unasyn anticipated 4-6 weeks    Continue fluconazole pending most recent abscess culture  Follow-up culture of perisplenic collection  · Mobilized out of bed  · DVT PPX:  Heparin and SCDs on during exam   · Will continue to follow  Subjective/Objective   Chief Complaint: "I want my pain meds"/follow-up externalized shunt    Subjective:  Patient complaining of significant abdominal pain  Complaining of diffuse weakness  Denies headaches, vision or speech difficulties  Objective:  Lying in bed  NAD  I/O       01/30 0701 - 01/31 0700 01/31 0701 - 02/01 0700 02/01 0701 - 02/02 0700    P  O  1991      I V  (mL/kg) 1869 6 (26 5) 3059 8 (43 3) 179 2 (2 5)    NG/GT  0     IV Piggyback 350 700 150     2 1336 350 7    Total Intake(mL/kg) 4630 8 (65 6) 5095 8 (72 1) 679 8 (9 6)    Urine (mL/kg/hr) 1450 (0 9) 2288 (1 3)     Emesis/NG output  0 (0)     Drains 353 (0 2) 484 (0 3)     Chest Tube  1795 (1 1)     Total Output 1803 4567      Net +2827 8 +528 8 +679 8           Unmeasured Urine Occurrence 1 x 2 x 1 x          Invasive Devices     Peripherally Inserted Central Catheter Line            PICC Line 72/35/72 Right Basilic 11 days          Drain            Closed/Suction Drain Left LLQ Bulb 19 Fr  6 days    Closed/Suction Drain Left LUQ Bulb 19 Fr  6 days    Closed/Suction Drain Right RLQ Bulb 19 Fr  6 days    Closed/Suction Drain Right RUQ Accordion 19 Fr  6 days    Ventriculostomy/Subdural Ventricular drainage catheter Right 6 days    Chest Tube 1 Right Pleural 10 Fr  1 day    Chest Tube 2 Left Pleural 10 Fr  1 day    Closed/Suction Drain Left Abdomen Bulb 12 Fr  1 day    NG/OG/Enteral Tube Enteral Feeding Tube Right nares 1 day                Physical Exam:  Vitals: Blood pressure 152/89, pulse 88, temperature 98 6 °F (37 °C), temperature source Oral, resp  rate (!) 31, height 5' 4" (1 626 m), weight 70 7 kg (155 lb 13 8 oz), SpO2 98 %, not currently breastfeeding  ,Body mass index is 26 75 kg/m²        General appearance: alert, appears stated age, cooperative and no distress  Head: Normocephalic, without obvious abnormality, atraumatic  Eyes: EOMI, PERRL  Neck: supple, symmetrical, trachea midline   Lungs: non labored breathing, chest tube in place  Heart: regular heart rate  Neurologic:   Mental status: Alert, oriented, thought content appropriate  Cranial nerves: grossly intact (Cranial nerves II-XII)  Sensory: normal to LT  Motor: moving all extremities with diffuse weakness 4/5  Reflexes:  No clonus bilaterally  Coordination: finger to nose minimal difficulties bilaterally, no drift bilaterally      Lab Results:    Results from last 7 days  Lab Units 02/01/18  0517 01/31/18  0436 01/30/18  0441   WBC Thousand/uL 17 85* 23 57* 32 71*   HEMOGLOBIN g/dL 7 9* 9 0* 9 9*   HEMATOCRIT % 24 2* 27 0* 29 3*   PLATELETS Thousands/uL 650* 711* 795*   MONO PCT MAN % 3* 2* 3*       Results from last 7 days  Lab Units 02/01/18  0517 01/31/18  0436 01/30/18  0441   SODIUM mmol/L 137 135* 133*   POTASSIUM mmol/L 4 6 3 4* 3 9   CHLORIDE mmol/L 100 96* 95*   CO2 mmol/L 33* 32 30   BUN mg/dL 14 16 21   CREATININE mg/dL 0 38* 0 31* 0 37*   CALCIUM mg/dL 8 8 9 8 10 3*   GLUCOSE RANDOM mg/dL 401* 127 73       Results from last 7 days  Lab Units 02/01/18  0517 01/31/18  0436 01/30/18  0441   MAGNESIUM mg/dL 1 9 2 0 1 8       Results from last 7 days  Lab Units 02/01/18  0517 01/31/18  0436 01/30/18  0441   PHOSPHORUS mg/dL 3 6 3 7 3 7         No results found for: TROPONINT  ABG:  Lab Results   Component Value Date    PHART 7 484 (H) 01/25/2018    FXN9QEM 33 6 (L) 01/25/2018    PO2ART 165 1 (H) 01/25/2018    [id number] 24 7 01/25/2018    BEART 1 6 01/25/2018    SOURCE Line, Arterial 01/25/2018       Imaging Studies: I have personally reviewed pertinent reports  and I have personally reviewed pertinent films in PACS    EKG, Pathology, and Other Studies: I have personally reviewed pertinent reports        VTE Pharmacologic Prophylaxis: Heparin    VTE Mechanical Prophylaxis: sequential compression device

## 2018-02-01 NOTE — MEDICAL STUDENT
Progress Note - Critical Care   Ada Guajardo 36 y o  female MRN: [id number]  Unit/Bed#: ICU 10 Encounter: [id number]    HPI/24hr events: Pt s/p bilateral chest tube insertion, drain placement, and kaofed placement into the third portion of the duodenum on   Pt reports significant pain in her abdomen, chest and back this a m  She received two doses each of   5 mg and 1 mg dilaudid last night  Neuro:   1  Day 5 S/p  shunt externalization   -per neurosurgery:    -shunt clamped   Will consider removal of shunt if pt tolerates   clamp    -opthalmology consult pending     -continue regular neuro checks    2  Analgesia/sedation:   -prn: dilaudid   5 mg IV q 3H, dilaudid 1 mg IV q3H : pt in considerable pain this a m, consider standing dose    5 mg dilaudid q3H with 1 mg for breakthrough pain                  CV: hemodynamically stable, not requiring pressors   1  3 systolic murmur: ECHO EF 65 % with no valvular regurg/stenosis; consider repeat KACIE upon improvement of GE leak   2  Maintain MAP > 65   2   Access:   -PICC line Right Basilic for 11 days                  Lun  CT abd/pelvis : Bilateral moderate to large right and left pleural effusions associated with compressive atelectasis in both lungs  -bilateral chest tubes placed yesterday by IR: continue to suction,  mL serous drainage,  mL serous drainage   -current SpO2 99 % on 1 L NC : adjust as needed to maintain O2 sat > 92 %   -encourage incentive spirometry  -pulmonary toilet                 GI:  2  Acute peritonitis:  - EGD with stent placement  - ex-lap for abdominal abscesses and fluid collections : 4 ADENIKE tubes put in place; Gardenia Citrin NG tube was put in place   -: Barium swallow negative for leak --> advanced to bariatric full liquid diet on   -: CT of abd/pelvis showed leak at the GE junction    -GI consulted: do not believe another stent would be helpful because  the original stent was already shown to be in place upon imaging    -NPO with TPN     -per surgery: will continue with conservative management --> if leak  does not close, will require complex surgical repair   -IR: drainage of perisplenic abscess with placement of 5th ADENIKE - initially  drained 130 cc of dark pus , kaofed placement into the 3rd part of the  duodenum   -per GI: pt will remain NPO as it will take time for the perforation to heal, will not start tube feeds at this time and pt will remain NPO    -plan for repeat CT scan in coming weeks to check for closure of  perforation  -stress ulcer prophylaxis: IV protonix 40 mg IV q24 h   -Bowel regimen: bisacodyl rectal suppository 10 mg PRN                 FEN:  1  Fluids: NS @ 125   1  Electrolytes: Replete electrolytes as needed (goal: K>4, Mag > 2 and Phos > 3) : relative hypomagnesemia (1 9), repleted   2  Nutrition:   -strict NPO  -TPN until perforation heals                :   -Urine output: 1 88 yesterday --> continue monitoring urine output   -Bun: 14, Cr:  38 --> continue trending BUN and Cr  -strict I and O                ID: WBC: 17 85 from 23 57 yesterday, remains afebrile   1  Acute peritonitis :  -continue current abx regimen per ID: unasyn 3 g 6 h; fluconazole 400 mg IV q 24 H yesterday secondary to persistent GI leak   -Cultures:   -1/25 operative cx: enterococcus and yest    -f/u rest of operative cultures    -1/31 IR drainage: perisplenic drain cx pending, no growth to date   -trend WBCs, temps and hemodynamics   2   Possible infected  shunt  -CSF cx from 1/25 negative   -will likely tx with IV abx x 4-6 weeks                  Heme:   -Hgb 7 9 from 9: possible dilutional secondary to total ins 5,095 mL yesterday   -blood management program    -subQ heparin restarted on 1/27                Endo:  -glucose of 401 this am: insulin 1-6 units q6H, algorithim 3                             Msk/Skin:  -frequent turning and pressure off-loading  -local wound care as needed Disposition: continue ICU care      Consultants:General surgery, neurosurgery, ID      VTE PPx:        Pharmacologic: Sub-q heparin       Mechanical: sequential compression device       Vitals:    18 0537 18 0545 18 0600 18 0700   BP: 154/76  156/81 153/87   BP Location:       Pulse: 86  90 90   Resp: 12  (!) 24 (!) 27   Temp:       TempSrc:       SpO2: 98%  99% 98%   Weight:  70 7 kg (155 lb 13 8 oz)     Height:         Arterial Line BP: 166/82  Arterial Line MAP (mmHg): 112 mmHg    Temperature:   Temp (24hrs), Av 2 °F (36 8 °C), Min:97 7 °F (36 5 °C), Max:98 6 °F (37 °C)    Current: Temperature: 98 6 °F (37 °C)    Weights:   IBW: 54 7 kg    Body mass index is 26 75 kg/m²  Weight (last 2 days)     Date/Time   Weight    18 0545  70 7 (155 87)              Hemodynamic Monitoring:  Non-Invasive/Invasive Ventilation Settings:  Respiratory    Lab Data (Last 4 hours)    None         O2/Vent Data (Last 4 hours)    None              No results found for: PHART, BXE5VFG, PO2ART, FXY5JLU, X4PMFITW, BEART, SOURCE      Intake and Outputs:  I/O        07 -  0700  07 -  0700    I V  (mL/kg)  1135 (16)    IV Piggyback  100    Total Intake(mL/kg)  1235 (17 4)    Urine (mL/kg/hr)  900    Drains  760    Total Output   1660    Net   -425                Nutrition:        Diet Orders            Start     Ordered    18  Diet NPO  Diet effective now     Comments:  NOTHING NPO   Question Answer Comment   Diet Type NPO    RD to adjust diet per protocol?  No        18 1709            Labs:     Results from last 7 days  Lab Units 18  0517 18  0436 18  0441 18  0501   WBC Thousand/uL 17 85* 23 57* 32 71* 25 53*   HEMOGLOBIN g/dL 7 9* 9 0* 9 9* 10 1*   HEMATOCRIT % 24 2* 27 0* 29 3* 30 9*   PLATELETS Thousands/uL 650* 711* 795* 753*   MONO PCT MAN %  --  2* 3* 4        Results from last 7 days  Lab Units 18  0517 18  0430 01/30/18  0441   SODIUM mmol/L 137 135* 133*   POTASSIUM mmol/L 4 6 3 4* 3 9   CHLORIDE mmol/L 100 96* 95*   CO2 mmol/L 33* 32 30   BUN mg/dL 14 16 21   CREATININE mg/dL 0 38* 0 31* 0 37*   CALCIUM mg/dL 8 8 9 8 10 3*   GLUCOSE RANDOM mg/dL 401* 127 73       Results from last 7 days  Lab Units 02/01/18  0517 01/31/18  0436 01/30/18  0441   MAGNESIUM mg/dL 1 9 2 0 1 8       Results from last 7 days  Lab Units 02/01/18  0517 01/31/18  0436 01/30/18  0441   PHOSPHORUS mg/dL 3 6 3 7 3 7            Results from last 7 days  Lab Units 01/25/18  2118   LACTIC ACID mmol/L 1 3     No results found for: TROPONINI      Micro:  Lab Results   Component Value Date    WOUNDCULT 2+ Growth of Enterococcus faecalis (A) 01/25/2018       Allergies:    Allergies   Allergen Reactions    Benadryl [Diphenhydramine] Swelling    Phenergan [Promethazine] Hives       Medications:   Scheduled Meds:    Current Facility-Administered Medications:  Adult TPN (CUSTOM BASE/CUSTOM ELECTROLYTE)  Intravenous Continuous ABNER Teran Last Rate: 69 9 mL/hr at 01/31/18 2200   Adult TPN (CUSTOM BASE/CUSTOM ELECTROLYTE)  Intravenous Continuous Kathrine Mcconnell PA-C    ampicillin-sulbactam 3 g Intravenous Q6H Marcell Dias MD Last Rate: Stopped (02/01/18 0405)   bisacodyl 10 mg Rectal Daily PRN ABNER Teran    fluconazole 400 mg Intravenous Q24H ABNER Teran Last Rate: Stopped (01/31/18 1801)   heparin (porcine) 5,000 Units Subcutaneous Q8H [facility] ABNER Tse    HYDROmorphone 0 5 mg Intravenous Q3H PRN Daniella Ma PA-C    HYDROmorphone 1 mg Intravenous Q3H PRN Karen Martinez MD    insulin lispro 1-6 Units Subcutaneous Q6H [facility] Nayla Mcconnell PA-C    iohexol 50 mL Oral 90 min pre-procedure Loyd Lee MD    magnesium sulfate 2 g Intravenous Once Kathrine Mcconnell PA-C    pantoprazole 40 mg Intravenous Q24H [facility] ABNER Tse      Continuous Infusions:    Adult TPN (CUSTOM BASE/CUSTOM ELECTROLYTE)  Last Rate: 69 9 mL/hr at 01/31/18 2200   Adult TPN (CUSTOM BASE/CUSTOM ELECTROLYTE)       PRN Meds:    bisacodyl 10 mg Daily PRN   HYDROmorphone 0 5 mg Q3H PRN   HYDROmorphone 1 mg Q3H PRN       Invasive lines and devices: Invasive Devices     Peripherally Inserted Central Catheter Line            PICC Line 24/18/25 Right Basilic 11 days          Drain            Closed/Suction Drain Left LLQ Bulb 19 Fr  6 days    Closed/Suction Drain Left LUQ Bulb 19 Fr  6 days    Closed/Suction Drain Right RLQ Bulb 19 Fr  6 days    Closed/Suction Drain Right RUQ Accordion 19 Fr  6 days    Ventriculostomy/Subdural Ventricular drainage catheter Right 6 days    Chest Tube 1 Right Pleural 10 Fr  less than 1 day    Chest Tube 2 Left Pleural 10 Fr  less than 1 day    Closed/Suction Drain Left Abdomen Bulb 12 Fr  less than 1 day    NG/OG/Enteral Tube Enteral Feeding Tube Right nares less than 1 day                      Code Status: Level 1 - Full Code     Portions of the record may have been created with voice recognition software  Occasional wrong word or "sound a like" substitutions may have occurred due to the inherent limitations of voice recognition software  Read the chart carefully and recognize, using context, where substitutions have occurred       Eliana Cantu

## 2018-02-01 NOTE — CONSULTS
Consultation - Anesthesia Acute Pain Management   Octavia Srinivasan 36 y o  female MRN: [id number]  Unit/Bed#: ICU 10 Encounter: [id number]               Admission Diagnosis:  Gastric leak [K91 89]     Consult Time: 30 minutes    Consult for pain management per surgeon's request     Assessment/Plan     Assessment:   71-year-old female who was recently transferred to [facility] after a complicated hospital course at [facility]  She has a history of a gastric sleeve surgery [facility] about a year ago and initially lost 100 lbs  Reema Cornejo had some reflux symptoms which prompted hernia repair surgery earlier this month  She also has a  shunt  She presented to [facility] after discharge in septic shock and was found to have a gastric perforation and is now status post ex lap times two as well as externalization of her  shunt   Cultures growing enterococcus   While at [facility] she had an esophageal stent placed to help aid in healing of her perforation  More recently she had increased leukocytosis which prompted a repeat CT scan which showed increased extravasation of contrast in the upper abdomen suggestive of persistent leak       She has severe abdominal pain poorly controlled with intermittent boluses of IV hydromorphone   She states the pain is throughout her abdomen and she received minor relief with her current PRN dosing       Plan:   1) Started ketamine infusion at 0 2mg/kg/hr  2) Continue IV hydromorphone PRN for breakthrough pain  3) If pain is not well controlled with ketamine and PRN hydromorphone, recommend ICU team write for dilaudid PCA with current settings: no continuous infusion, demand dose 0 2mg q10min, max 1 2mg/hr    History of Present Illness    Admit Date:  1/24/2018  Hospital Day:  8 days  Primary Service:  Critical Care/ICU  Attending Provider:  Abelardo Hurley MD  Physician Requesting Consult: Abelardo Hurley MD  Reason for Consult / Principal Problem: ACUTE POSTOPERATIVE PAIN CONTROL  HPI    See above    Review of Systems     See above    Pain History:  Current pain location(s): Abdomen  Pain Scale:   10/10  Current Analgesic regimen: Hydromorphone IV 0 5-1mg q3h PRN, lidocaine patch    Historical Information   History reviewed  No pertinent past medical history  Past Surgical History:   Procedure Laterality Date    LAPAROTOMY N/A 1/25/2018    Procedure: Exploratory Laparotomy, wash out,placement of drains, placement of NG feeding tube ; Surgeon: Hamilton Guzman DO;  [facility];  Service: General    HI REPLACEMENT/REVISION,CSF SHUNT Right 1/25/2018    Procedure: Externalization of right-sided SHUNT VENTRICULAR-PERITONEAL in anterior chest wall ribs two and three level  ;  Surgeon: Ion Belle MD;  Location[facility];  Service: Neurosurgery     Social History   History   Alcohol Use No     History   Drug Use No     History   Smoking Status    Never Smoker   Smokeless Tobacco    Never Used       Meds/Allergies     Allergies   Allergen Reactions    Benadryl [Diphenhydramine] Swelling    Phenergan [Promethazine] Hives       Objective   /89   Pulse 88   Temp 98 6 °F (37 °C) (Oral)   Resp (!) 31   Ht 5' 4" (1 626 m)   Wt 70 7 kg (155 lb 13 8 oz)   LMP  (LMP Unknown)   SpO2 98%   Breastfeeding? No   BMI 26 75 kg/m²   Temp:  [98 °F (36 7 °C)-98 6 °F (37 °C)] 98 6 °F (37 °C)  HR:  [] 88  Resp:  [12-40] 31  BP: (147-174)/() 152/89    Intake/Output Summary (Last 24 hours) at 02/01/18 1319  Last data filed at 02/01/18 1139   Gross per 24 hour   Intake          5088 09 ml   Output             3574 ml   Net          1514 09 ml       Physical Exam    Gen: Well appearing, wincing in pain  CV: RRR  Resp: CTAB  Abd: Tenderness to palpation throughout  Neuro: No focal deficits    Counseling / Coordination of Care  Total floor / unit time spent today 30 minutes   Greater than 50% of total time was spent with the patient and / or family counseling and / or coordination of care       SIGNATURE: Luan Rodriguez MD  DATE: February 1, 2018  TIME: 1:19 PM

## 2018-02-01 NOTE — PROGRESS NOTES
Progress Note - Infectious Disease   Hayden Robison 36 y o  female MRN: [id number]  Unit/Bed#: ICU 10 Encounter: [id number]      Impression/Recommendations:  1   Intra-abdominal/pelvic abscesses   Patient is status post repeat abdominal washout   She has multiple drains in place   She is status post placement of drainage in a new perisplenic collection  She is clinically well and systemically stable  Operative culture from [facility], growing only ampicillin susceptible Enterococcus   Operative culture here also growing  only ampicillin susceptible Enterococcus  Continue with IV Unasyn  Continue fluconazole for now, pending most recent abscess culture  Follow-up on culture of new perisplenic collection      2   Possible infected  shunt   Given presence of tip of  shunt in peritoneal space, there is high probability of  shunt infection   Fortunately, patient has no symptoms concerning for meningitis   She has neurological deficits   She is status post tapping of  shunt at [facility]   CSF culture there had no growth but patient had prior antibiotic  Myrtle Blandon will go ahead and treat her for possible/presumptive  shunt infection   Patient is now status post  shunt externalization   CSF culture here also negative       Antibiotic plan as in above  Likely treat with IV antibiotic x 4-6 weeks      3   Gastric perforation, status post repair, with persistent leak   She is now status post placement of esophageal stent   Repeat video barium swallow from last week with no further leakage  Unfortunately, repeat abdomen/pelvis CT showed recurrent leak with new perisplenic collection  She is now status post placement of duodenal feeding tube and drainage of the perisplenic collection  However, both Critical Care service and GI service feel that patient should stay on TPN and not use feeding tube for enteral feeding  Monitor for recurrent leak    Patient will most likely need a repeat barium swallow down the line      4   Recurrent leukocytosis   Patient has no fever   She is clinically and systemically stable   This is most likely secondary to recurrent leak and abscess in upper abdomen  WBC continues to decrease with drainage of new abscess  Antibiotic plan as in above  Monitor WBC and temperature      5  Septic shock, on presentation at [facility]   This is secondary to gastric perforation   Patient is now hemodynamically stable   All cultures from [facility] obtained and reviewed, now on chart   All blood cultures were negative   Urine culture negative   CSF culture negative   Operative culture positive for Enterococcus, as in above  Antibiotic plan as in above  Monitor hemodynamics      Discussed with the patient and her fiance in detail regarding above plan  Discussed with Critical Care surgery service      Antibiotics:  Unasyn  POD # 7  Fluconazole # 3     Subjective:  Patient has abdominal pain, controlled  No headache  No chest wall pain  She remains in ICU  Temperature stays down   No chills  She is tolerating antibiotic well   No nausea, vomiting or diarrhea  Objective:  Vitals:  HR:  [] 88  Resp:  [12-40] 31  BP: (147-174)/() 152/89  SpO2:  [97 %-100 %] 98 %  Temp (24hrs), Av 4 °F (36 9 °C), Min:98 °F (36 7 °C), Max:98 6 °F (37 °C)  Current: Temperature: 98 6 °F (37 °C)    Physical Exam:     General: Awake, alert, cooperative, no distress  Chest:  VPS site clean  No drainage  No erythema/warmth  No tenderness  Lungs: Expansion symmetric, no rales, no wheezing, respirations unlabored  Heart[de-identified]  Regular rate and rhythm, S1 and S2 normal, no murmur  Abdomen: Soft, mildly distended, incision clean, stable incisional tenderness, drains seropurulent  no masses, no organomegaly  Extremities: Trace edema  No erythema/warmth  No ulcer  Nontender to palpation  Skin:  No rash       Invasive Devices     Peripherally Inserted Central Catheter Line PICC Line 30/30/49 Right Basilic 11 days          Drain            Closed/Suction Drain Left LLQ Bulb 19 Fr  6 days    Closed/Suction Drain Left LUQ Bulb 19 Fr  6 days    Closed/Suction Drain Right RLQ Bulb 19 Fr  6 days    Closed/Suction Drain Right RUQ Accordion 19 Fr  6 days    Ventriculostomy/Subdural Ventricular drainage catheter Right 6 days    Chest Tube 1 Right Pleural 10 Fr  1 day    Chest Tube 2 Left Pleural 10 Fr  1 day    Closed/Suction Drain Left Abdomen Bulb 12 Fr  1 day    NG/OG/Enteral Tube Enteral Feeding Tube Right nares 1 day                Labs studies:   I have personally reviewed pertinent labs  Results from last 7 days  Lab Units 02/01/18  0517 01/31/18  0436 01/30/18  0441   SODIUM mmol/L 137 135* 133*   POTASSIUM mmol/L 4 6 3 4* 3 9   CHLORIDE mmol/L 100 96* 95*   CO2 mmol/L 33* 32 30   ANION GAP mmol/L 4 7 8   BUN mg/dL 14 16 21   CREATININE mg/dL 0 38* 0 31* 0 37*   EGFR ml/min/1 73sq m 130 139 131   GLUCOSE RANDOM mg/dL 401* 127 73   CALCIUM mg/dL 8 8 9 8 10 3*       Results from last 7 days  Lab Units 02/01/18  0517 01/31/18  0436 01/30/18  0441   WBC Thousand/uL 17 85* 23 57* 32 71*   HEMOGLOBIN g/dL 7 9* 9 0* 9 9*   PLATELETS Thousands/uL 650* 711* 795*       Results from last 7 days  Lab Units 01/31/18  0902 01/31/18  0845 01/31/18  0842 01/30/18  1151 01/25/18  1851 01/25/18  1819 01/25/18  1704   GRAM STAIN RESULT  No Polys or Bacteria seen Rare Polys  No bacteria seen 1+ Polys  No bacteria seen No Polys or Bacteria seen No Polys or Bacteria seen 3+ Polys  No bacteria seen 1+ Polys  No bacteria seen   WOUND CULTURE   --   --   --   --   --  2+ Growth of Enterococcus faecalis*  --    BODY FLUID CULTURE, STERILE  Culture too young- will reincubate No growth No growth  --   --   --   --        Imaging Studies:   I have personally reviewed pertinent imaging study reports and images in PACS      EKG, Pathology, and Other Studies:   I have personally reviewed pertinent reports

## 2018-02-02 LAB
ALBUMIN SERPL BCP-MCNC: 1.1 G/DL (ref 3.5–5)
ALP SERPL-CCNC: 104 U/L (ref 46–116)
ALT SERPL W P-5'-P-CCNC: 15 U/L (ref 12–78)
ANION GAP SERPL CALCULATED.3IONS-SCNC: 3 MMOL/L (ref 4–13)
ANISOCYTOSIS BLD QL SMEAR: PRESENT
AST SERPL W P-5'-P-CCNC: 15 U/L (ref 5–45)
BACTERIA CSF CULT: NO GROWTH
BASOPHILS # BLD MANUAL: 0.23 THOUSAND/UL (ref 0–0.1)
BASOPHILS NFR MAR MANUAL: 1 % (ref 0–1)
BILIRUB SERPL-MCNC: 0.38 MG/DL (ref 0.2–1)
BUN SERPL-MCNC: 18 MG/DL (ref 5–25)
CALCIUM SERPL-MCNC: 9 MG/DL (ref 8.3–10.1)
CHLORIDE SERPL-SCNC: 101 MMOL/L (ref 100–108)
CO2 SERPL-SCNC: 35 MMOL/L (ref 21–32)
CREAT SERPL-MCNC: 0.27 MG/DL (ref 0.6–1.3)
EOSINOPHIL # BLD MANUAL: 0 THOUSAND/UL (ref 0–0.4)
EOSINOPHIL NFR BLD MANUAL: 0 % (ref 0–6)
ERYTHROCYTE [DISTWIDTH] IN BLOOD BY AUTOMATED COUNT: 15.8 % (ref 11.6–15.1)
FUNGUS SPEC CULT: ABNORMAL
FUNGUS SPEC CULT: ABNORMAL
GFR SERPL CREATININE-BSD FRML MDRD: 146 ML/MIN/1.73SQ M
GLUCOSE SERPL-MCNC: 150 MG/DL (ref 65–140)
GLUCOSE SERPL-MCNC: 155 MG/DL (ref 65–140)
GLUCOSE SERPL-MCNC: 159 MG/DL (ref 65–140)
GLUCOSE SERPL-MCNC: 169 MG/DL (ref 65–140)
GLUCOSE SERPL-MCNC: 211 MG/DL (ref 65–140)
HCT VFR BLD AUTO: 28.1 % (ref 34.8–46.1)
HGB BLD-MCNC: 9 G/DL (ref 11.5–15.4)
LYMPHOCYTES # BLD AUTO: 0.93 THOUSAND/UL (ref 0.6–4.47)
LYMPHOCYTES # BLD AUTO: 4 % (ref 14–44)
MACROCYTES BLD QL AUTO: PRESENT
MAGNESIUM SERPL-MCNC: 1.9 MG/DL (ref 1.6–2.6)
MCH RBC QN AUTO: 29.6 PG (ref 26.8–34.3)
MCHC RBC AUTO-ENTMCNC: 32 G/DL (ref 31.4–37.4)
MCV RBC AUTO: 92 FL (ref 82–98)
METAMYELOCYTES NFR BLD MANUAL: 2 % (ref 0–1)
MONOCYTES # BLD AUTO: 0.47 THOUSAND/UL (ref 0–1.22)
MONOCYTES NFR BLD: 2 % (ref 4–12)
NEUTROPHILS # BLD MANUAL: 21.22 THOUSAND/UL (ref 1.85–7.62)
NEUTS BAND NFR BLD MANUAL: 1 % (ref 0–8)
NEUTS SEG NFR BLD AUTO: 90 % (ref 43–75)
NRBC BLD AUTO-RTO: 0 /100 WBCS
PHOSPHATE SERPL-MCNC: 2.8 MG/DL (ref 2.7–4.5)
PLATELET # BLD AUTO: 730 THOUSANDS/UL (ref 149–390)
PLATELET BLD QL SMEAR: ABNORMAL
PMV BLD AUTO: 9.1 FL (ref 8.9–12.7)
POIKILOCYTOSIS BLD QL SMEAR: PRESENT
POLYCHROMASIA BLD QL SMEAR: PRESENT
POTASSIUM SERPL-SCNC: 3.6 MMOL/L (ref 3.5–5.3)
PROT SERPL-MCNC: 4.9 G/DL (ref 6.4–8.2)
RBC # BLD AUTO: 3.04 MILLION/UL (ref 3.81–5.12)
RBC MORPH BLD: PRESENT
SODIUM SERPL-SCNC: 139 MMOL/L (ref 136–145)
WBC # BLD AUTO: 23.32 THOUSAND/UL (ref 4.31–10.16)

## 2018-02-02 PROCEDURE — C9113 INJ PANTOPRAZOLE SODIUM, VIA: HCPCS | Performed by: NURSE PRACTITIONER

## 2018-02-02 PROCEDURE — 99231 SBSQ HOSP IP/OBS SF/LOW 25: CPT | Performed by: THORACIC SURGERY (CARDIOTHORACIC VASCULAR SURGERY)

## 2018-02-02 PROCEDURE — 85007 BL SMEAR W/DIFF WBC COUNT: CPT | Performed by: EMERGENCY MEDICINE

## 2018-02-02 PROCEDURE — 99024 POSTOP FOLLOW-UP VISIT: CPT | Performed by: PHYSICIAN ASSISTANT

## 2018-02-02 PROCEDURE — 99233 SBSQ HOSP IP/OBS HIGH 50: CPT | Performed by: EMERGENCY MEDICINE

## 2018-02-02 PROCEDURE — 99024 POSTOP FOLLOW-UP VISIT: CPT | Performed by: SURGERY

## 2018-02-02 PROCEDURE — 97110 THERAPEUTIC EXERCISES: CPT

## 2018-02-02 PROCEDURE — 82948 REAGENT STRIP/BLOOD GLUCOSE: CPT

## 2018-02-02 PROCEDURE — 80053 COMPREHEN METABOLIC PANEL: CPT | Performed by: EMERGENCY MEDICINE

## 2018-02-02 PROCEDURE — 99233 SBSQ HOSP IP/OBS HIGH 50: CPT | Performed by: INTERNAL MEDICINE

## 2018-02-02 PROCEDURE — 83735 ASSAY OF MAGNESIUM: CPT | Performed by: EMERGENCY MEDICINE

## 2018-02-02 PROCEDURE — 85027 COMPLETE CBC AUTOMATED: CPT | Performed by: EMERGENCY MEDICINE

## 2018-02-02 PROCEDURE — 97530 THERAPEUTIC ACTIVITIES: CPT

## 2018-02-02 PROCEDURE — 84100 ASSAY OF PHOSPHORUS: CPT | Performed by: EMERGENCY MEDICINE

## 2018-02-02 PROCEDURE — G8987 SELF CARE CURRENT STATUS: HCPCS

## 2018-02-02 PROCEDURE — 97167 OT EVAL HIGH COMPLEX 60 MIN: CPT

## 2018-02-02 PROCEDURE — G8988 SELF CARE GOAL STATUS: HCPCS

## 2018-02-02 RX ORDER — POTASSIUM CHLORIDE 29.8 MG/ML
40 INJECTION INTRAVENOUS ONCE
Status: COMPLETED | OUTPATIENT
Start: 2018-02-02 | End: 2018-02-02

## 2018-02-02 RX ORDER — MIRTAZAPINE 15 MG/1
15 TABLET, ORALLY DISINTEGRATING ORAL
Status: DISCONTINUED | OUTPATIENT
Start: 2018-02-02 | End: 2018-03-09 | Stop reason: HOSPADM

## 2018-02-02 RX ORDER — MAGNESIUM SULFATE 1 G/100ML
1 INJECTION INTRAVENOUS ONCE
Status: COMPLETED | OUTPATIENT
Start: 2018-02-02 | End: 2018-02-02

## 2018-02-02 RX ADMIN — PANTOPRAZOLE SODIUM 40 MG: 40 INJECTION, POWDER, FOR SOLUTION INTRAVENOUS at 10:08

## 2018-02-02 RX ADMIN — FLUCONAZOLE 400 MG: 2 INJECTION INTRAVENOUS at 16:30

## 2018-02-02 RX ADMIN — HEPARIN SODIUM 5000 UNITS: 5000 INJECTION, SOLUTION INTRAVENOUS; SUBCUTANEOUS at 13:42

## 2018-02-02 RX ADMIN — HYDROMORPHONE HYDROCHLORIDE 1 MG: 1 INJECTION, SOLUTION INTRAMUSCULAR; INTRAVENOUS; SUBCUTANEOUS at 03:45

## 2018-02-02 RX ADMIN — Medication 3 G: at 03:45

## 2018-02-02 RX ADMIN — INSULIN LISPRO 1 UNITS: 100 INJECTION, SOLUTION INTRAVENOUS; SUBCUTANEOUS at 04:07

## 2018-02-02 RX ADMIN — HEPARIN SODIUM 5000 UNITS: 5000 INJECTION, SOLUTION INTRAVENOUS; SUBCUTANEOUS at 22:13

## 2018-02-02 RX ADMIN — MAGNESIUM SULFATE HEPTAHYDRATE 1 G: 1 INJECTION, SOLUTION INTRAVENOUS at 13:41

## 2018-02-02 RX ADMIN — LIDOCAINE 2 PATCH: 50 PATCH TOPICAL at 10:00

## 2018-02-02 RX ADMIN — Medication 3 G: at 11:00

## 2018-02-02 RX ADMIN — INSULIN LISPRO 2 UNITS: 100 INJECTION, SOLUTION INTRAVENOUS; SUBCUTANEOUS at 18:15

## 2018-02-02 RX ADMIN — HYDROMORPHONE HYDROCHLORIDE 1 MG: 1 INJECTION, SOLUTION INTRAMUSCULAR; INTRAVENOUS; SUBCUTANEOUS at 18:12

## 2018-02-02 RX ADMIN — Medication 3 G: at 16:00

## 2018-02-02 RX ADMIN — INSULIN LISPRO 1 UNITS: 100 INJECTION, SOLUTION INTRAVENOUS; SUBCUTANEOUS at 06:04

## 2018-02-02 RX ADMIN — THIAMINE HYDROCHLORIDE: 100 INJECTION, SOLUTION INTRAMUSCULAR; INTRAVENOUS at 21:12

## 2018-02-02 RX ADMIN — HYDROMORPHONE HYDROCHLORIDE 0.5 MG: 1 INJECTION, SOLUTION INTRAMUSCULAR; INTRAVENOUS; SUBCUTANEOUS at 20:11

## 2018-02-02 RX ADMIN — HYDROMORPHONE HYDROCHLORIDE 1 MG: 1 INJECTION, SOLUTION INTRAMUSCULAR; INTRAVENOUS; SUBCUTANEOUS at 13:42

## 2018-02-02 RX ADMIN — Medication 3 G: at 22:13

## 2018-02-02 RX ADMIN — HYDROMORPHONE HYDROCHLORIDE 1 MG: 1 INJECTION, SOLUTION INTRAMUSCULAR; INTRAVENOUS; SUBCUTANEOUS at 22:16

## 2018-02-02 RX ADMIN — MIRTAZAPINE 15 MG: 15 TABLET, ORALLY DISINTEGRATING ORAL at 22:14

## 2018-02-02 RX ADMIN — HYDROMORPHONE HYDROCHLORIDE 1 MG: 1 INJECTION, SOLUTION INTRAMUSCULAR; INTRAVENOUS; SUBCUTANEOUS at 10:08

## 2018-02-02 RX ADMIN — HEPARIN SODIUM 5000 UNITS: 5000 INJECTION, SOLUTION INTRAVENOUS; SUBCUTANEOUS at 06:16

## 2018-02-02 RX ADMIN — KETAMINE HYDROCHLORIDE 0.2 MG/KG/HR: 50 INJECTION, SOLUTION INTRAMUSCULAR; INTRAVENOUS at 04:07

## 2018-02-02 RX ADMIN — POTASSIUM CHLORIDE 40 MEQ: 400 INJECTION, SOLUTION INTRAVENOUS at 11:30

## 2018-02-02 NOTE — PLAN OF CARE
Problem: OCCUPATIONAL THERAPY ADULT  Goal: Performs self-care activities at highest level of function for planned discharge setting  See evaluation for individualized goals  Treatment Interventions: ADL retraining, Functional transfer training, Endurance training, Patient/family training, Equipment evaluation/education, Compensatory technique education, Activityengagement          See flowsheet documentation for full assessment, interventions and recommendations  Limitation: Decreased ADL status, Decreased self-care trans, Decreased high-level ADLs, Decreased endurance  Prognosis: Good  Assessment: Pt is a 36 y o  female seen for OT evaluation s/p admit to [facility] on 1/24/2018 w/ Gastric leak  Transferred from [facility] on 1/24/18 for esophageal stent s/p upper gastric injury during Laparoscopic paraesophageal hernia repair  She developed sepsis and had a prolonged hospital stay at [facility] requiring two trips to the OR (both washouts done laparoscopically)  Patient required IR drainage at [place] for intrabdominal abscess (POD #9) from second operation where gastric injury was found  She has IIH with  shunt placed  Comorbidities affecting pt's functional performance at time of assessment include:  shunt, gasrtic sleeve  Personal factors affecting pt at time of IE include:steps to enter environment, difficulty performing ADLS, difficulty performing IADLS  and health management   Prior to admission, pt was I ADL, IADL, drives, no equip use, no limitations  Upon evaluation: Pt requires max UB ADL bed level, dep LB ADL bed level, unable to tolerate bed mobility or transition OOB  2* the following deficits impacting occupational performance: weakness, decreased strength, decreased tolerance and increased pain   Pt to benefit from continued skilled OT tx while in the hospital to address deficits as defined above and maximize level of functional independence w ADL's and functional mobility  Occupational Performance areas to address include: grooming, bathing/shower, toilet hygiene, dressing, health maintenance and functional mobility  Barthel index 20/100  From OT standpoint, recommendation at time of d/c would be STR         OT Discharge Recommendation: Short Term Rehab  OT - OK to Discharge: No

## 2018-02-02 NOTE — CONSULTS
Consultation - Ophthalmology   Sherry Whitley 36 y o  female MRN: [id number]  Unit/Bed#: ICU 10 Encounter: [id number]       Assessment / Plan:  Patient Active Problem List   Diagnosis    Gastric leak    Acute peritonitis    Idiopathic intracranial hypertension    S/P  shunt     (ventriculoperitoneal) shunt status    Murmur, cardiac    Refusal of blood product    Gastroesophageal reflux disease     1  Pseudotumor Cerebri - no optic nerve edema on exam today  2  Choroidal nevus right eye  - recommend routine follow up 6-12 months      History of Present Illness   Physician Requesting Consult: Echo Peter MD  Reason for Consult / Principal Problem: evaluation of optic nerve for edema (papilledema)  HPI: Sherry Wihtley is a 36y o  year old female who presents with headaches and elevated intraocular pressure    Consults    Historical Information   History reviewed  No pertinent past medical history  Past Ocular History:  Past Surgical History:   Procedure Laterality Date    LAPAROTOMY N/A 1/25/2018    Procedure: Exploratory Laparotomy, wash out,placement of drains, placement of NG feeding tube ; Surgeon: Justin Barragan DO;  Location: [facility];  Service: General    NV REPLACEMENT/REVISION,CSF SHUNT Right 1/25/2018    Procedure: Externalization of right-sided SHUNT VENTRICULAR-PERITONEAL in anterior chest wall ribs two and three level  ;  Surgeon: Jose Meyer MD;  Location: [facility];  Service: Neurosurgery     Social History   History   Alcohol Use No     History   Drug Use No     History   Smoking Status    Never Smoker   Smokeless Tobacco    Never Used     Family History: History reviewed  No pertinent family history      Meds/Allergies   all current active meds have been reviewed    Allergies   Allergen Reactions    Benadryl [Diphenhydramine] Swelling    Phenergan [Promethazine] Hives       Objective   Vitals: Blood pressure 166/90, pulse 88, temperature 98 3 °F (36 8 °C), temperature source Oral, resp  rate 17, height 5' 4" (1 626 m), weight 70 7 kg (155 lb 13 8 oz), SpO2 98 %, not currently breastfeeding  Eyes:    PERRL, conjunctiva/corneas clear, EOM's intact, dilated exam small optic cups both eyes, no edema, vessels, vitreous normal both eyes, small flat nevus temporally right retina    Lab Results: I have personally reviewed pertinent lab results  Imaging Studies: I have personally reviewed pertinent reports  Pathology, and Other Studies: I have personally reviewed pertinent reports        Code Status: Level 1 - Full Code  Advance Directive and Living Will:      Power of :    POLST:      Dinorah Arroyo MD

## 2018-02-02 NOTE — PHYSICAL THERAPY NOTE
PT TREATMENT       02/02/18 1430   Pain Assessment   Pain Assessment No/denies pain   Pain Score 8   Pain Type Acute pain;Surgical pain   Pain Location Abdomen   Restrictions/Precautions   Weight Bearing Precautions Per Order No   Other Precautions Fall Risk;Pain;Telemetry;Multiple lines   General   Chart Reviewed Yes   Response to Previous Treatment Patient with no complaints from previous session  Family/Caregiver Present Yes  (MOTHER)   Cognition   Arousal/Participation Lethargic   Subjective   Subjective WHEN PATIENT WAS ASKED "WHAT IS YOUR GOAL FOR THERAPY TODAY" SHE RESPONDED WITH "TO SPELL IT AND THEN PROCEEDED TO SPELL THE WORD"   Endurance Deficit   Endurance Deficit Yes   Endurance Deficit Description EDEMA, LETHARGY, PAIN, WEAKNESS   Activity Tolerance   Activity Tolerance Patient limited by pain; Patient limited by fatigue   Nurse Made Aware RN JOSE CRUZ   Exercises   Quad Sets 10 reps;AROM; Bilateral;Supine   Heelslides Supine;10 reps;AAROM; Bilateral   Glute Sets Supine;10 reps;AROM; Bilateral   Hip Abduction Supine;10 reps;AAROM; Bilateral   Ankle Pumps Supine;Bilateral;AROM;20 reps   UE Exercise Supine;Bilateral;AROM;10 reps   Assessment   Prognosis Good   Problem List Decreased strength;Decreased endurance; Impaired balance;Decreased mobility; Decreased skin integrity;Pain   Assessment PT INITIATED TREATMENT SESSION IN ORDER TO ASSIST PATIENT IN ACHIEVING GOALS TO IMPROVE STRENGTH AND OVERALL ACTIVITY TOLERANCE  PATIENT DECLINING TRIAL OF OOB TRANSFER OR EVEN BED MOBILITY  PATIENT DOES NOT EXPRESS ANY THERAPY GOALS BESIDES "I CAN SPELL THERAPY"  PATIENT APPEARS TO PRESENT WITH SELF LIMITING BEHAVIORS AND REQUIRES FREQUENT ENCOURAGEMENT TO PARTICIPATE IN THERAPY SERVICES  IN SUPINE POSITION PATIENT PARTICIPATED IN ACTIVE AND ACTIVE ASSISTED B/L LE AND UE THER-EX  PATIENT PERFORMED SHOULDER SHRUGS, ELBOW FLEXION/EXTENSION, AND SHOULDER FLEXION X 10 REPS  SHE PERFORMED B/L LE THER-EX AS DESCRIBED ABOVE   POST PT TREATMENT SESSIONS PATIENT'S B/L LE WERE POSITIONED ON TWO PILLOWS TO ASSIST IN REDUCED B/L LE EDEMA  PT SPOKE WITH PT CLINICAL DIRECTOR TO ORDER KREG CATALYST BED TO ASSIST WITH OOB MOBILITY AND STANDING IN FUTURE SESSIONS  ALSO SPOKE WITH RN TO RECOMMEND UTILIZATION OF COMPRESSION STOCKINGS  NEXT SESSION PLAN TO TRIAL SUPNIE-->SIT TRANSFER AS TOLERATED  RECOMMENDATION FOR STR REMAINS APPROPRIATE  PATIENT WILL BENEFIT FROM CONTINUED SKILLED INPT PT THIS ADMISSION TO ACHIEVE MAXIMAL FUNCTION AND SAFETY  Goals   Patient Goals "TO SPELL THERAPY"   LTG Expiration Date 02/15/18   Treatment Day 1   Plan   Treatment/Interventions Functional transfer training;LE strengthening/ROM; Therapeutic exercise; Endurance training;Bed mobility; Patient/family training;OT;Spoke to nursing   Progress Slow progress, decreased activity tolerance   PT Frequency 5x/wk   Recommendation   Recommendation Short-term skilled PT   Equipment Recommended (CATALYST BED )   PT - OK to Discharge Yes  (TO STR WHEN MED FREDERIC )   Kamila Robison, PT

## 2018-02-02 NOTE — PROGRESS NOTES
02/02/18 1400   Plan of Care   Comments Attempted PT visist- door closed   Assessment Completed by: Unit visit

## 2018-02-02 NOTE — PROGRESS NOTES
Progress Note - Infectious Disease   Sherry Whitley 36 y o  female MRN: [id number]  Unit/Bed#: ICU 10 Encounter: [id number]      Impression/Recommendations:  1   Intra-abdominal/pelvic abscesses   Patient is status post repeat abdominal washout   She has multiple drains in place   She is status post placement of drainage in a new perisplenic collection   She is clinically well and systemically stable  Operative culture from [facility], growing only ampicillin susceptible Enterococcus   Operative culture here also growing  only ampicillin susceptible Enterococcus  Latest culture also growing yeast   Continue with IV Unasyn/fluconazole  Follow-up on culture of new perisplenic collection      2   Possible infected  shunt   Given presence of tip of  shunt in peritoneal space, there is high probability of  shunt infection   Fortunately, patient has no symptoms concerning for meningitis   She has neurological deficits   She is status post tapping of  shunt at [facility]   CSF culture there had no growth but patient had prior antibiotic  Malorie Kitchen will go ahead and treat her for possible/presumptive  shunt infection   Patient is now status post  shunt externalization   CSF culture here also negative       Antibiotic plan as in above  Likely treat with IV antibiotic x 4-6 weeks      3   Gastric perforation, status post repair, with persistent leak   She is now status post placement of esophageal stent   Repeat video barium swallow from last week with no further leakage  Unfortunately, repeat abdomen/pelvis CT showed recurrent leak with new perisplenic collection   She is now status post placement of duodenal feeding tube and drainage of the perisplenic collection  However, both Critical Care service and GI service feel that patient should stay on TPN and not use feeding tube for enteral feeding  Monitor for recurrent leak  Patient will most likely need a repeat barium swallow down the line      4   Recurrent leukocytosis   Patient has no fever   She is clinically and systemically stable   This is most likely secondary to recurrent leak and abscess in upper abdomen   WBC decreased  with drainage of new abscess  However, WBC increased over last 24 hours  Will monitor closely  If WBC continues to increase, will need to repeat abdomen/pelvis CT  Antibiotic plan as in above  Monitor WBC and temperature      5  Septic shock, on presentation at [facility]   This is secondary to gastric perforation   Patient is now hemodynamically stable   All cultures from [facility] obtained and reviewed, now on chart   All blood cultures were negative   Urine culture negative   CSF culture negative   Operative culture positive for Enterococcus, as in above  Antibiotic plan as in above  Monitor hemodynamics      Discussed with the patient and her fiance in detail regarding above plan      Antibiotics:  Unasyn  POD # 8  Fluconazole # 4     Subjective:  Patient's abdominal pain is stable, controlled  No headache  No chest wall pain  She remains in ICU  Temperature stays down   No chills  She is tolerating antibiotic well   No nausea, vomiting or diarrhea  Objective:  Vitals:  HR:  [86-96] 86  Resp:  [13-37] 25  BP: (151-179)/(81-99) 163/89  SpO2:  [94 %-99 %] 98 %  Temp (24hrs), Av 7 °F (37 1 °C), Min:98 3 °F (36 8 °C), Max:99 1 °F (37 3 °C)  Current: Temperature: 98 6 °F (37 °C)    Physical Exam:     General: Awake, alert, cooperative, no distress  Chest:  VPS site clean  No drainage  No erythema/warmth  No tenderness  Lungs: Expansion symmetric, no rales, no wheezing, respirations unlabored  Heart[de-identified]  Regular rate and rhythm, S1 and S2 normal, no murmur  Abdomen: Soft, incision clean  Drains serial purulence  Stable mild to moderate incisional tenderness  Decreased bowel sounds  Extremities: Trace edema  No erythema/warmth  No ulcer  Nontender to palpation     Skin:  No rash      Invasive Devices     Peripherally Inserted Central Catheter Line            PICC Line 34/97/80 Right Basilic 12 days          Drain            Closed/Suction Drain Left LLQ Bulb 19 Fr  7 days    Closed/Suction Drain Left LUQ Bulb 19 Fr  7 days    Closed/Suction Drain Right RLQ Bulb 19 Fr  7 days    Closed/Suction Drain Right RUQ Accordion 19 Fr  7 days    Ventriculostomy/Subdural Ventricular drainage catheter Right 7 days    Chest Tube 1 Right Pleural 10 Fr  2 days    Chest Tube 2 Left Pleural 10 Fr  2 days    Closed/Suction Drain Left Abdomen Bulb 12 Fr  2 days    NG/OG/Enteral Tube Enteral Feeding Tube Right nares 2 days                Labs studies:   I have personally reviewed pertinent labs  Results from last 7 days  Lab Units 02/02/18 0415 02/01/18  0517 01/31/18  0436   SODIUM mmol/L 139 137 135*   POTASSIUM mmol/L 3 6 4 6 3 4*   CHLORIDE mmol/L 101 100 96*   CO2 mmol/L 35* 33* 32   ANION GAP mmol/L 3* 4 7   BUN mg/dL 18 14 16   CREATININE mg/dL 0 27* 0 38* 0 31*   EGFR ml/min/1 73sq m 146 130 139   GLUCOSE RANDOM mg/dL 159* 401* 127   CALCIUM mg/dL 9 0 8 8 9 8   AST U/L 15  --   --    ALT U/L 15  --   --    ALK PHOS U/L 104  --   --    TOTAL PROTEIN g/dL 4 9*  --   --    BILIRUBIN TOTAL mg/dL 0 38  --   --        Results from last 7 days  Lab Units 02/02/18 0415 02/01/18  0517 01/31/18  0436   WBC Thousand/uL 23 32* 17 85* 23 57*   HEMOGLOBIN g/dL 9 0* 7 9* 9 0*   PLATELETS Thousands/uL 730* 650* 711*       Results from last 7 days  Lab Units 01/31/18  0902 01/31/18  0845 01/31/18  0842 01/30/18  1151   GRAM STAIN RESULT  No Polys or Bacteria seen Rare Polys  No bacteria seen 1+ Polys  No bacteria seen No Polys or Bacteria seen   BODY FLUID CULTURE, STERILE  Few Colonies of Enterococcus species*  Few Colonies of Yeast* No growth No growth  --        Imaging Studies:   I have personally reviewed pertinent imaging study reports and images in PACS      EKG, Pathology, and Other Studies:   I have personally reviewed pertinent reports

## 2018-02-02 NOTE — PROGRESS NOTES
Progress Note - General  Surgery   Ghassan Franklin 36 y o  female MRN: [id number]  Unit/Bed#: ICU 10 Encounter: [id number]    Assessment:  42F with gastric perforation s/p lap HHR at OSH, s/p esophageal stent placement, ex-lap and washout  She has evidence of persistent leak on imaging yesterday which appears to be a delayed leak 2/2 reflux  Also with bilateral pleural effusions and large perisplenic fluid collections      -all drains serous, including L perigastric drain despite flushing  -no bowel function  -on tpn, remains npo    Plan:  Continue NPO w/ TPN  -would hold off on tube feeds for now to prevent reflux and delayed healing  -chest tube management per thoracic, cultures negative     Subjective/Objective   Chief Complaint: None    Subjective: Pain controlled, on ketamine gtt  Continues on 1 L NC    Objective:     Blood pressure 166/90, pulse 88, temperature 98 3 °F (36 8 °C), temperature source Oral, resp  rate 17, height 5' 4" (1 626 m), weight 70 7 kg (155 lb 13 8 oz), SpO2 98 %, not currently breastfeeding  ,Body mass index is 26 75 kg/m²        Intake/Output Summary (Last 24 hours) at 02/02/18 0716  Last data filed at 02/02/18 0600   Gross per 24 hour   Intake          2778 28 ml   Output             1884 ml   Net           894 28 ml       Invasive Devices     Peripherally Inserted Central Catheter Line            PICC Line 03/10/80 Right Basilic 12 days          Drain            Closed/Suction Drain Left LLQ Bulb 19 Fr  7 days    Closed/Suction Drain Left LUQ Bulb 19 Fr  7 days    Closed/Suction Drain Right RLQ Bulb 19 Fr  7 days    Closed/Suction Drain Right RUQ Accordion 19 Fr  7 days    Ventriculostomy/Subdural Ventricular drainage catheter Right 7 days    Chest Tube 1 Right Pleural 10 Fr  1 day    Chest Tube 2 Left Pleural 10 Fr  1 day    Closed/Suction Drain Left Abdomen Bulb 12 Fr  1 day    NG/OG/Enteral Tube Enteral Feeding Tube Right nares 1 day                Physical Exam:   Gen: A&O, NAD  Cardio: RRR  Lungs: CTAB  Abd: Soft, non distended, slightly tender  Staples c/d/i  ADENIKE x 4, all serous, except BUD which is serosanginous  IR drain to buzz-splenic area is serous in tubing        Lab, Imaging and other studies:  CBC:   Lab Results   Component Value Date    WBC 23 32 (H) 02/02/2018    HGB 9 0 (L) 02/02/2018    HCT 28 1 (L) 02/02/2018    MCV 92 02/02/2018     (H) 02/02/2018    MCH 29 6 02/02/2018    MCHC 32 0 02/02/2018    RDW 15 8 (H) 02/02/2018    MPV 9 1 02/02/2018    NRBC 0 02/02/2018   , CMP:   Lab Results   Component Value Date     02/02/2018    K 3 6 02/02/2018     02/02/2018    CO2 35 (H) 02/02/2018    ANIONGAP 3 (L) 02/02/2018    BUN 18 02/02/2018    CREATININE 0 27 (L) 02/02/2018    GLUCOSE 159 (H) 02/02/2018    CALCIUM 9 0 02/02/2018    AST 15 02/02/2018    ALT 15 02/02/2018    ALKPHOS 104 02/02/2018    PROT 4 9 (L) 02/02/2018    BILITOT 0 38 02/02/2018    EGFR 146 02/02/2018     VTE Pharmacologic Prophylaxis: Heparin  VTE Mechanical Prophylaxis: sequential compression device

## 2018-02-02 NOTE — PROGRESS NOTES
Progress Note - Neurosurgery   [place] Foots 36 y o  female MRN: [id number]  Unit/Bed#: ICU 10 Encounter: [id number]    Assessment:  1  Procedure day 8 externalization of right-sided ventriculoperitoneal shunt  2  History of ventriculoperitoneal shunt for idiopathic intracranial hypertension (originally placed May 30, 2017)  3  Left upper extremity occlusive thrombophlebitis cephalic vein  4  Peritonitis   5  Gastric perforation status post repair    Plan:  50-year-old female transfer [facility] for esophageal stent status post upper gastric injury during laparoscopic paraesophageal hernia repair  Patient developed sepsis and required multiple operative procedures  Upon transfer to [facility], she underwent a esophageal stent and externalization ventriculoperitoneal shunt  · Exam reveals diffuse weakness  Alert and slow to answer questions  Following commands  Dressing intact  · Imaging reviewed personally and by attending  Final results as below  · Shunt series:  Intact catheter with Codman Hakim shunt set to 70mm H2O  · Externalized shunt clamped  Monitor exam    · CSF sent 1/30/18 - Glucose 56, RBC 0, Protein 25, WBC 0, gram stain no bacteria, culture Final no growth  · Considering removal of shunt  Given extensive abdominal infection, recommend against return of ventricular catheter into abdominal cavity  If patient shunt dependent, may consider a [facility] shunt  Recommend removal and trial without shunt as patient had original diagnosis of pseudotumor cerebri prior to significant weight loss  Exam grossly stable since clamp  No papilledema noted  Timing to be discussed with primary team  · Tentative plan for removal of entire shunt  · ophthalmology evaluation completed  No optic nerve edema on exam today  Choroidal nevus right eye-routine follow-up  · Pain control per primary team   · Mobilize daily  · SSC following -  ADENIKE drains x5    Trend white count (17 85) 23 32  · Repeat CT scan CAP w 1/30/18 with persistent leak  · Status post IR perisplenic drain placement bilateral chest tubes for pleural effusion along with Keofed placement  GI recommend defer use of Keofed and recommend continue TPN  · Chest tube placed to suction until cultures negative  · Infectious disease following - continue on IV Unasyn anticipated 4-6 weeks  Continue fluconazole pending most recent abscess culture  Follow-up culture of perisplenic collection  · Ongoing medical management per primary team   · Mobilized out of bed  Encouraged mobilization out of bed  · DVT PPX:  Heparin and SCDs on during exam   · Will continue to follow  Subjective/Objective   Chief Complaint: follow-up externalization shunt    Subjective:  Complaining of right scapular pain and diffuse abdominal pain control medications  Denies headache or vision changes  Objective:  Lying in bed  NAD  I/O       01/31 0701 - 02/01 0700 02/01 0701 - 02/02 0700 02/02 0701 - 02/03 0700    P  O        I V  (mL/kg) 3059 8 (43 3) 450 2 (6 4) 84 6 (1 2)    NG/GT 0 0     IV Piggyback 700 650     TPN 1336 1678 1 415 2    Total Intake(mL/kg) 5095 8 (72 1) 2778 3 (39 3) 499 8 (7 1)    Urine (mL/kg/hr) 2288 (1 3) 550 (0 3) 825 (1 8)    Emesis/NG output 0 (0) 0 (0)     Drains 484 (0 3) 104 (0 1)     Chest Tube 1795 (1 1) 1230 (0 7)     Total Output 4567 1884 825    Net +528 8 +894 3 -325  2           Unmeasured Urine Occurrence 2 x 2 x           Invasive Devices     Peripherally Inserted Central Catheter Line            PICC Line 29/09/37 Right Basilic 12 days          Drain            Closed/Suction Drain Left LLQ Bulb 19 Fr  7 days    Closed/Suction Drain Left LUQ Bulb 19 Fr  7 days    Closed/Suction Drain Right RLQ Bulb 19 Fr  7 days    Closed/Suction Drain Right RUQ Accordion 19 Fr  7 days    Ventriculostomy/Subdural Ventricular drainage catheter Right 7 days    Chest Tube 1 Right Pleural 10 Fr  2 days    Chest Tube 2 Left Pleural 10 Fr  2 days    Closed/Suction Drain Left Abdomen Bulb 12 Fr  2 days    NG/OG/Enteral Tube Enteral Feeding Tube Right nares 2 days                Physical Exam:  Vitals: Blood pressure 163/89, pulse 86, temperature 98 6 °F (37 °C), temperature source Oral, resp  rate (!) 25, height 5' 4" (1 626 m), weight 70 7 kg (155 lb 13 8 oz), SpO2 98 %, not currently breastfeeding  ,Body mass index is 26 75 kg/m²        General appearance:  Drowsy, appears stated age, cooperative and no distress  Head: Normocephalic, without obvious abnormality, atraumatic  Eyes: EOMI good upgaze, pupils recently dilated by Ophthalmology, 7-8 mm and nonreactive  Neck: supple, symmetrical, trachea midline   Lungs: non labored breathing  Heart: regular heart rate  Neurologic:   Mental status:  Drowsy, oriented, thought content appropriate, appropriate slow responses  Cranial nerves: grossly intact (Cranial nerves II-XII)  Sensory: normal to LT  Motor: moving all extremities with diffuse weakness proximal greater than distal  Coordination: finger to nose normal bilaterally, no drift bilaterally      Lab Results:    Results from last 7 days  Lab Units 02/02/18 0415 02/01/18  0517 01/31/18  0436   WBC Thousand/uL 23 32* 17 85* 23 57*   HEMOGLOBIN g/dL 9 0* 7 9* 9 0*   HEMATOCRIT % 28 1* 24 2* 27 0*   PLATELETS Thousands/uL 730* 650* 711*   MONO PCT MAN % 2* 3* 2*       Results from last 7 days  Lab Units 02/02/18 0415 02/01/18  0517 01/31/18  0436   SODIUM mmol/L 139 137 135*   POTASSIUM mmol/L 3 6 4 6 3 4*   CHLORIDE mmol/L 101 100 96*   CO2 mmol/L 35* 33* 32   BUN mg/dL 18 14 16   CREATININE mg/dL 0 27* 0 38* 0 31*   CALCIUM mg/dL 9 0 8 8 9 8   TOTAL PROTEIN g/dL 4 9*  --   --    BILIRUBIN TOTAL mg/dL 0 38  --   --    ALK PHOS U/L 104  --   --    ALT U/L 15  --   --    AST U/L 15  --   --    GLUCOSE RANDOM mg/dL 159* 401* 127       Results from last 7 days  Lab Units 02/02/18 0415 02/01/18  0517 01/31/18  0436   MAGNESIUM mg/dL 1 9 1 9 2 0       Results from last 7 days  Lab Units 02/02/18  0415 02/01/18  0517 01/31/18  0436   PHOSPHORUS mg/dL 2 8 3 6 3 7         No results found for: TROPONINT  ABG:  Lab Results   Component Value Date    PHART 7 484 (H) 01/25/2018    [id number] 33 6 (L) 01/25/2018    PO2ART 165 1 (H) 01/25/2018    [id number] 24 7 01/25/2018    BEART 1 6 01/25/2018    SOURCE Line, Arterial 01/25/2018       Imaging Studies: I have personally reviewed pertinent reports  and I have personally reviewed pertinent films in PACS    EKG, Pathology, and Other Studies: I have personally reviewed pertinent reports        VTE Pharmacologic Prophylaxis: Heparin    VTE Mechanical Prophylaxis: sequential compression device

## 2018-02-02 NOTE — MEDICAL STUDENT
Progress Note - Critical Care   Octavia Srinivasan 36 y o  female MRN: [id number]  Unit/Bed#: ICU 10 Encounter: [id number]    HPI/24hr events: Pt was seen by acute pain yesterday who added ketamine to her pain regimen  Neuro:   1  Day 6 S/p  shunt externalization   -per neurosurgery:    -shunt clamped  : plan to tie off the shunt at the level of the chest because pt has not had    neurological or visual complaints     -opthalmology consult pending     -continue regular neuro checks    2  Analgesia/sedation:   -continuous: ketamine @  2 mg/kg/hr   -scheduled: licoderm patches x 2   -prn: haldol 2 mg IM q 30 mins, dilaudid   5 mg q3 h and 1 mg q3 h, ativan 1 mg IM q 1 h    -had one dose of dilauded last night at 0345                  CV: hemodynamically stable, not requiring pressors   1  3/ systolic murmur: 3/05 KACIE: no wall motion abnormalities, no valvular disease   2  Maintain MAP > 65   3  High blood pressures: 7 systolic readings in the 308I and 10 diastolic readings in the 42I within the last 24 hours : consider prn hydralazine for BP greater than 180/120  4  Access:   -PICC line Right Basilic for 12 days                  Lun  CT abd/pelvis : Bilateral moderate to large right and left pleural effusions associated with compressive atelectasis in both lungs  -: bilateral chest tubes placed by IR: water seal,  mL serous drainage,  mL serous drainage   -robinul   2 mg IV q 4H prn    -encourage incentive spirometry  -pulmonary toilet                 GI:  1   Acute peritonitis:  - EGD with stent placement  - ex-lap for abdominal abscesses and fluid collections : 4 ADENIKE tubes put in place; Jermaine Ho NG tube was put in place   -: Barium swallow negative for leak --> advanced to bariatric full liquid diet on   -: CT of abd/pelvis showed leak at the GE junction   -: IR placement of additional ADENIKE drain of perisplenic collection and kaofed tube in 3rd portion of duodenum  -per surgery: will continue with conservative management --> if leak does not close, will require complex surgical repair  -per GI: another stent will not be useful because original stent was positioned correctly, pt will remain NPO with TPN as it will take time for the perforation to heal, will not start tube feeds at this time     -plan for repeat CT scan in coming weeks to check for closure of perforation  -stress ulcer prophylaxis: IV protonix 40 mg IV q24 h                 FEN:  1  Electrolytes: Replete electrolytes as needed (goal: K>4, Mag > 2 and Phos > 3) : relative hypomagnesemia (1 9) and hypophosphatemia (2 8), consider repletion   2  Nutrition:   -strict NPO  -TPN until perforation heals                :   -Urine output: 550 mL yesterday --> one unmeasured occurrence that was sizeable, consider a straight cath or bladder scan to assess residual urine   -Bun: 18, Cr:  27 --> continue trending BUN and Cr  -strict I and O                ID: WBC: 23 32 from 17 85 yesterday, remains afebrile   1  Acute peritonitis :  -continue current abx regimen per ID: unasyn 3 g 6 h; fluconazole 400 mg IV q 24 H yesterday secondary to persistent GI leak   -Cultures:   -1/25:    -anaerobic cx of abdominal and chest wall: no growth    -CSF cx and gram stain: negative    -fungal CSF: negative    -f/u viral cx    -1/31 IR drainage: perisplenic drain cx pending, no growth to date   -trend WBCs, temps and hemodynamics   2   Possible infected  shunt  -will likely tx with IV abx x 4-6 weeks                  Heme:   -Hgb stable at 9   -blood management program    -subQ heparin restarted on 1/27                Endo:  -glucose 159 this am: insulin 1-6 units q6H, algorithim 3                              Msk/Skin:  -frequent turning and pressure off-loading  -local wound care as needed                  Disposition: continue ICU care      Consultants:General surgery, neurosurgery, ID      VTE PPx:        Pharmacologic: Sub-q heparin       Mechanical: sequential compression device       Vitals:    18 0300 18 0344 18 0400 18 0416   BP: 162/92 (!) 173/85 (!) 173/91    BP Location:   Right arm    Pulse: 92 92 92 90   Resp: (!) 27 (!) 33 (!) 33 (!) 26   Temp:   98 3 °F (36 8 °C)    TempSrc:   Oral    SpO2: 98% 99% 94% 97%   Weight:       Height:         Arterial Line BP: 166/82  Arterial Line MAP (mmHg): 112 mmHg    Temperature:   Temp (24hrs), Av 7 °F (37 1 °C), Min:98 3 °F (36 8 °C), Max:99 1 °F (37 3 °C)    Current: Temperature: 98 3 °F (36 8 °C)    Weights:   IBW: 54 7 kg    Body mass index is 26 75 kg/m²  Weight (last 2 days)     Date/Time   Weight    18 0545  70 7 (155 87)              Hemodynamic Monitoring:  Non-Invasive/Invasive Ventilation Settings:  Respiratory    Lab Data (Last 4 hours)    None         O2/Vent Data (Last 4 hours)    None              No results found for: PHART, EMM0KSY, PO2ART, GIO1VQT, B5VJKPQZ, BEART, SOURCE      Intake and Outputs:  I/O        07 -  0700  07 -  0700    I V  (mL/kg)  1135 (16)    IV Piggyback  100    Total Intake(mL/kg)  1235 (17 4)    Urine (mL/kg/hr)  900    Drains  760    Total Output   1660    Net   -425                Nutrition:        Diet Orders            Start     Ordered    18  Diet NPO  Diet effective now     Comments:  NOTHING NPO   Question Answer Comment   Diet Type NPO    RD to adjust diet per protocol?  No        18 1709            Labs:     Results from last 7 days  Lab Units 18  0415 18  0517 18  0436 18  0441   WBC Thousand/uL 23 32* 17 85* 23 57* 32 71*   HEMOGLOBIN g/dL 9 0* 7 9* 9 0* 9 9*   HEMATOCRIT % 28 1* 24 2* 27 0* 29 3*   PLATELETS Thousands/uL 730* 650* 711* 795*   MONO PCT MAN %  --  3* 2* 3*        Results from last 7 days  Lab Units 18  0415 18  0517 18  0436   SODIUM mmol/L 139 137 135*   POTASSIUM mmol/L 3 6 4 6 3 4*   CHLORIDE mmol/L 101 100 96*   CO2 mmol/L 35* 33* 32   BUN mg/dL 18 14 16   CREATININE mg/dL 0 27* 0 38* 0 31*   CALCIUM mg/dL 9 0 8 8 9 8   TOTAL PROTEIN g/dL 4 9*  --   --    BILIRUBIN TOTAL mg/dL 0 38  --   --    ALK PHOS U/L 104  --   --    ALT U/L 15  --   --    AST U/L 15  --   --    GLUCOSE RANDOM mg/dL 159* 401* 127       Results from last 7 days  Lab Units 02/02/18  0415 02/01/18  0517 01/31/18  0436   MAGNESIUM mg/dL 1 9 1 9 2 0       Results from last 7 days  Lab Units 02/02/18  0415 02/01/18  0517 01/31/18  0436   PHOSPHORUS mg/dL 2 8 3 6 3 7              No results found for: TROPONINI      Micro:  Lab Results   Component Value Date    WOUNDCULT 2+ Growth of Enterococcus faecalis (A) 01/25/2018       Allergies:    Allergies   Allergen Reactions    Benadryl [Diphenhydramine] Swelling    Phenergan [Promethazine] Hives       Medications:   Scheduled Meds:    Current Facility-Administered Medications:  Adult TPN (CUSTOM BASE/CUSTOM ELECTROLYTE)  Intravenous Continuous Kathrine Raymond PA-C Last Rate: 69 2 mL/hr at 02/01/18 2225   ampicillin-sulbactam 3 g Intravenous Q6H Moncho Kelly MD Last Rate: 3 g (02/02/18 0345)   fluconazole 400 mg Intravenous Q24H ABNER Kent Last Rate: Stopped (02/01/18 1900)   glycopyrrolate 0 2 mg Intravenous Q4H PRN Ayala Irwin MD    haloperidol lactate 2 mg Intramuscular Q30 Min PRN Ayala Irwin MD    heparin (porcine) 5,000 Units Subcutaneous [facility] ABNER [place]    HYDROmorphone 0 5 mg Intravenous Q3H PRN Chanda Howell PA-C    HYDROmorphone 1 mg Intravenous Q3H PRN Loree Mahoney MD    insulin lispro 1-5 Units Subcutaneous Q6H [facility] Nayla Mcconnell PA-C    iohexol 50 mL Oral 90 min pre-procedure Jitendra Clayton MD    ketamine 0 2 mg/kg/hr Intravenous Continuous Ayala Irwin MD Last Rate: 0 2 mg/kg/hr (02/02/18 0407)   lidocaine 2 patch Transdermal Daily Dennis Steven PA-C    LORazepam 1 mg Intramuscular Q1H PRN Ayala Irwin MD    pantoprazole 40 mg Intravenous Q24H [address] Aretha R [employer]NER [employer]      Continuous Infusions:    Adult TPN (CUSTOM BASE/CUSTOM ELECTROLYTE)  Last Rate: 69 2 mL/hr at 02/01/18 2225   ketamine 0 2 mg/kg/hr Last Rate: 0 2 mg/kg/hr (02/02/18 0407)     PRN Meds:    glycopyrrolate 0 2 mg Q4H PRN   haloperidol lactate 2 mg Q30 Min PRN   HYDROmorphone 0 5 mg Q3H PRN   HYDROmorphone 1 mg Q3H PRN   LORazepam 1 mg Q1H PRN       Invasive lines and devices: Invasive Devices     Peripherally Inserted Central Catheter Line            PICC Line 17/11/88 Right Basilic 12 days          Drain            Closed/Suction Drain Left LLQ Bulb 19 Fr  7 days    Closed/Suction Drain Left LUQ Bulb 19 Fr  7 days    Closed/Suction Drain Right RLQ Bulb 19 Fr  7 days    Closed/Suction Drain Right RUQ Accordion 19 Fr  7 days    Ventriculostomy/Subdural Ventricular drainage catheter Right 7 days    Chest Tube 1 Right Pleural 10 Fr  1 day    Chest Tube 2 Left Pleural 10 Fr  1 day    Closed/Suction Drain Left Abdomen Bulb 12 Fr  1 day    NG/OG/Enteral Tube Enteral Feeding Tube Right nares 1 day                      Code Status: Level 1 - Full Code     Portions of the record may have been created with voice recognition software  Occasional wrong word or "sound a like" substitutions may have occurred due to the inherent limitations of voice recognition software  Read the chart carefully and recognize, using context, where substitutions have occurred       Sanchez Dwyer

## 2018-02-02 NOTE — PROGRESS NOTES
Progress Note - Thoracic Surgery   Chalo Cano 36 y o  female MRN: [id number]  Unit/Bed#: ICU 10 Encounter: [id number]    Assessment:  42F with gastric perforation s/p lap HHR at OSH, s/p esophageal stent placement, ex-lap and washout  She has evidence of persistent leak on imaging yesterday which appears to be a delayed leak 2/2 reflux  Also with bilateral pleural effusions and large perisplenic fluid collections  - CT R/L output 370 / 520 respectively, serous    Plan:  Continue NPO w/ TPN  Recommend TF through [place] tube  CT to water seal  Continue conservative management at this time    Subjective/Objective   Chief Complaint: None    Subjective: Pain controlled, on ketamine gtt  Continues on 1 L NC    Objective:     Blood pressure (!) 173/91, pulse 90, temperature 98 3 °F (36 8 °C), temperature source Oral, resp  rate (!) 26, height 5' 4" (1 626 m), weight 70 7 kg (155 lb 13 8 oz), SpO2 97 %, not currently breastfeeding  ,Body mass index is 26 75 kg/m²  Intake/Output Summary (Last 24 hours) at 02/02/18 0542  Last data filed at 02/02/18 0355   Gross per 24 hour   Intake          1994 79 ml   Output             2020 ml   Net           -25 21 ml       Invasive Devices     Peripherally Inserted Central Catheter Line            PICC Line 40/22/50 Right Basilic 12 days          Drain            Closed/Suction Drain Left LLQ Bulb 19 Fr  7 days    Closed/Suction Drain Left LUQ Bulb 19 Fr  7 days    Closed/Suction Drain Right RLQ Bulb 19 Fr  7 days    Closed/Suction Drain Right RUQ Accordion 19 Fr  7 days    Ventriculostomy/Subdural Ventricular drainage catheter Right 7 days    Chest Tube 1 Right Pleural 10 Fr  1 day    Chest Tube 2 Left Pleural 10 Fr  1 day    Closed/Suction Drain Left Abdomen Bulb 12 Fr  1 day    NG/OG/Enteral Tube Enteral Feeding Tube Right nares 1 day                Physical Exam:   Gen: A&O, NAD  Cardio: RRR  Lungs: CTAB  Abd: Soft, non distended, slightly tender   Staples c/d/i  ADENIKE x 4, all serous, except BUD which is serosanginous   IR drain to buzz-splenic area is brownish-yellow      Lab, Imaging and other studies:  CBC:   Lab Results   Component Value Date    WBC 23 32 (H) 02/02/2018    HGB 9 0 (L) 02/02/2018    HCT 28 1 (L) 02/02/2018    MCV 92 02/02/2018     (H) 02/02/2018    MCH 29 6 02/02/2018    MCHC 32 0 02/02/2018    RDW 15 8 (H) 02/02/2018    MPV 9 1 02/02/2018   , CMP:   Lab Results   Component Value Date     02/02/2018    K 3 6 02/02/2018     02/02/2018    CO2 35 (H) 02/02/2018    ANIONGAP 3 (L) 02/02/2018    BUN 18 02/02/2018    CREATININE 0 27 (L) 02/02/2018    GLUCOSE 159 (H) 02/02/2018    CALCIUM 9 0 02/02/2018    AST 15 02/02/2018    ALT 15 02/02/2018    ALKPHOS 104 02/02/2018    PROT 4 9 (L) 02/02/2018    BILITOT 0 38 02/02/2018    EGFR 146 02/02/2018     VTE Pharmacologic Prophylaxis: Heparin  VTE Mechanical Prophylaxis: sequential compression device

## 2018-02-02 NOTE — PROGRESS NOTES
Progress Note - Critical Care   Miladys Devlin 36 y o  female MRN: [id number]  Unit/Bed#: ICU 10 Encounter: [id number]    Assessment: 35 y/o female with complicated PMHx with hydrocephalus with  shunt, Hx Prerna en y, and recent hernia repair who presents s/p ex-lap x2 and externalization of  shunt  Found to have further anastamotic leak on CT scan unamenable to further stenting  IR for drainage of abdominal collection, bilateral thoracentesis with placement of B/L CT and keofed placement  Per GI strict NPO will need to allow leak to heal with repeat CT and esophagram in next few weeks      Principal Problem:    Gastric leak  Active Problems:    Acute peritonitis    Idiopathic intracranial hypertension    S/P  shunt     (ventriculoperitoneal) shunt status    Murmur, cardiac    Refusal of blood product    Gastroesophageal reflux disease  Resolved Problems:    Peritonitis    Plan:   · Neuro: GCS 15 AAOx4  · Analgesia:  Ketamine drip 0 2  · Requiring PRN Dilaudid Q3hr on schedule  · Consider addition of dilaudid PCA per APS recs  · No continuous infusion, demand dose 0 2mg q10min max 1 2 mg/hr  · Sedation:  None  ·  shunt secondary to pseudotumor cerebri externalized 1/25:  Clamped at level of chest per neurosurgery if tolerated clamping will discuss probable removal rather than internalization   · CV:   · Murmur: TTE inconclusive   · Lung:   · B/L Pleural effusions: per thoracic surgery continue to suction until cultures negative  · Right CT 370cc serous fluid  · Left CT 520cc serous fluid   · GI:   · GERD: Protonix  · Bowel Regimen: [place], PRN miralax   · Peritonitis 2/2 GE junction leak  · S/p EGD with stent, ex-lap x2 with drain placement  · Repeat cultures from perisplenic drain pending   · Drains   · RUQ: 10cc  · RLQ 8cc  · LUQ 0cc  · LLQ 8 cc  · Perisplenic 60cc  · FEN:   · Fluids no maintenance   · Electrolytes - Monitor/trend - replete based on deficiencies   · Nutrition: will reorder TPN   · : · No active issues, continue to trend Cr   · UOP 1 0cc/kg/hr  · ID:   · Abdominal Collection: Continue Unasyn day 7 fluconazole day 3 pending further culture results, appreciate ID input    · Leukocytosis stable, persistently afebrile   · Cultures from perisplenic drain pending   · Heme:   · Hgb stable at 9, blood management program   · PPX: SQH  · Lab holiday tomorrow   · Endo:   · Hyperglycemia: SSI algorithm 2   · Msk/Skin:   · Turn frequently and reposition   · Disposition: ICU level care     ______________________________________________________________________    HPI/24hr events: Started on Ketamine drip, pain slightly better controlled  Still requiring heavy use of PRN Dilaudid  Lines  Right Basilic PICC  RUQ, RLQ, LUQ, LLQ ADENIKE drains  Perisplenic Drain  B/L Chest Tube  Keofed    Infusions  TPN  ______________________________________________________________________  Physical Exam:  Curran Agitation Sedation Scale (RASS): Alert and calm  Physical Exam   Constitutional: She is oriented to person, place, and time  She appears well-developed and well-nourished  HENT:   Head: Normocephalic and atraumatic  Eyes: Pupils are equal, round, and reactive to light  No scleral icterus  Neck: Normal range of motion  Neck supple  No JVD present  Cardiovascular: Normal rate and regular rhythm  Holosystolic murmur     Pulmonary/Chest: Effort normal and breath sounds normal    B/L chest tubes in place     Abdominal: Soft  Bowel sounds are normal  She exhibits no distension  There is tenderness  ADENIKE drains in place     Musculoskeletal: Normal range of motion  She exhibits no edema  Neurological: She is alert and oriented to person, place, and time  Skin: Skin is warm and dry   No erythema      ______________________________________________________________________  Temperature:   Temp (24hrs), Av 7 °F (37 1 °C), Min:98 3 °F (36 8 °C), Max:99 1 °F (37 3 °C)    Current Temperature: 98 3 °F (36 8 °C)    Vitals:    02/02/18 0300 02/02/18 0344 02/02/18 0400 02/02/18 0416   BP: 162/92 (!) 173/85 (!) 173/91    BP Location:   Right arm    Pulse: 92 92 92 90   Resp: (!) 27 (!) 33 (!) 33 (!) 26   Temp:   98 3 °F (36 8 °C)    TempSrc:   Oral    SpO2: 98% 99% 94% 97%   Weight:       Height:         Arterial Line BP: 166/82       Weights:   IBW: 54 7 kg    Body mass index is 26 75 kg/m²  Weight (last 2 days)     Date/Time   Weight    02/01/18 0545  70 7 (155 87)            Height: 5' 4" (162 6 cm)  Hemodynamic Monitoring:  N/A       Ventilator Settings:  2L Nasal Cannula   No results found for: PHART, HDH4CES, PO2ART, DES2OXY, L0QVISII, BEART, SOURCE    Intake and Outputs:  I/O       01/31 0701 - 02/01 0700 02/01 0701 - 02/02 0700    I V  (mL/kg) 3059 8 (43 3) 261 (3 7)    NG/GT 0 0    IV Piggyback 700 450    TPN 1336 838 8    Total Intake(mL/kg) 5095 8 (72 1) 1549 8 (21 9)    Urine (mL/kg/hr) 2288 (1 3) 550 (0 3)    Emesis/NG output 0 (0) 0 (0)    Drains 484 (0 3) 86 (0 1)    Chest Tube 1795 (1 1) 890 (0 5)    Total Output 4567 1526    Net +528 8 +23 8          Unmeasured Urine Occurrence 2 x 1 x        UOP: 1 0cc/kg/hour   Nutrition:        Diet Orders            Start     Ordered    01/31/18 1709  Diet NPO  Diet effective now     Comments:  NOTHING NPO   Question Answer Comment   Diet Type NPO    RD to adjust diet per protocol?  No        01/31/18 1709          Labs:     Results from last 7 days  Lab Units 02/02/18  0415 02/01/18  0517 01/31/18  0436 01/30/18  0441   WBC Thousand/uL 23 32* 17 85* 23 57* 32 71*   HEMOGLOBIN g/dL 9 0* 7 9* 9 0* 9 9*   HEMATOCRIT % 28 1* 24 2* 27 0* 29 3*   PLATELETS Thousands/uL 730* 650* 711* 795*   MONO PCT MAN %  --  3* 2* 3*      Results from last 7 days  Lab Units 02/02/18  0415 02/01/18  0517 01/31/18  0436   SODIUM mmol/L 139 137 135*   POTASSIUM mmol/L 3 6 4 6 3 4*   CHLORIDE mmol/L 101 100 96*   CO2 mmol/L 35* 33* 32   BUN mg/dL 18 14 16   CREATININE mg/dL 0 27* 0 38* 0 31* CALCIUM mg/dL 9 0 8 8 9 8   TOTAL PROTEIN g/dL 4 9*  --   --    BILIRUBIN TOTAL mg/dL 0 38  --   --    ALK PHOS U/L 104  --   --    ALT U/L 15  --   --    AST U/L 15  --   --    GLUCOSE RANDOM mg/dL 159* 401* 127       Results from last 7 days  Lab Units 02/02/18  0415 02/01/18  0517 01/31/18  0436   MAGNESIUM mg/dL 1 9 1 9 2 0       Results from last 7 days  Lab Units 02/02/18  0415 02/01/18  0517 01/31/18  0436   PHOSPHORUS mg/dL 2 8 3 6 3 7              No results found for: TROPONINI  Imaging: NA I have personally reviewed pertinent reports  and I have personally reviewed pertinent films in PACS  EKG: NA  Micro:  Blood Culture: No results found for: BLOODCX  Urine Culture: No results found for: URINECX  Sputum Culture: No components found for: SPUTUMCX  Wound Culure:   Lab Results   Component Value Date    WOUNDCULT 2+ Growth of Enterococcus faecalis (A) 01/25/2018       Lab Results   Component Value Date    WOUNDCULT 2+ Growth of Enterococcus faecalis (A) 01/25/2018     Allergies:    Allergies   Allergen Reactions    Benadryl [Diphenhydramine] Swelling    Phenergan [Promethazine] Hives     Medications:   Scheduled Meds:  Current Facility-Administered Medications:  Adult TPN (CUSTOM BASE/CUSTOM ELECTROLYTE)  Intravenous Continuous Chuck Pierce PA-C Last Rate: 69 2 mL/hr at 02/01/18 2225   ampicillin-sulbactam 3 g Intravenous Q6H Denise Esparza MD Last Rate: 3 g (02/02/18 0345)   fluconazole 400 mg Intravenous Q24H ABNER Emanuel Last Rate: Stopped (02/01/18 1900)   glycopyrrolate 0 2 mg Intravenous Q4H PRN Hugo Calvin MD    haloperidol lactate 2 mg Intramuscular Q30 Min PRN Hugo Calvin MD    heparin (porcine) 5,000 Units Subcutaneous [facility] ABNER Emanuel    HYDROmorphone 0 5 mg Intravenous Q3H PRN Muna Hunter PA-C    HYDROmorphone 1 mg Intravenous Q3H PRN Kristine Perez MD    insulin lispro 1-5 Units Subcutaneous Q6H [facility] Nayla Mcconnell PA-C    iohexol 50 mL Oral 90 min pre-procedure Missy Richter MD    ketamine 0 2 mg/kg/hr Intravenous Continuous John Ryan MD Last Rate: 0 2 mg/kg/hr (02/02/18 0407)   lidocaine 2 patch Transdermal Daily Aysha Pavon PA-C    LORazepam 1 mg Intramuscular Q1H PRN John Ryan MD    pantoprazole 40 mg Intravenous Q24H [address] ABNER Tse      Continuous Infusions:  Adult TPN (CUSTOM BASE/CUSTOM ELECTROLYTE)  Last Rate: 69 2 mL/hr at 02/01/18 2225   ketamine 0 2 mg/kg/hr Last Rate: 0 2 mg/kg/hr (02/02/18 0407)     PRN Meds:    glycopyrrolate 0 2 mg Q4H PRN   haloperidol lactate 2 mg Q30 Min PRN   HYDROmorphone 0 5 mg Q3H PRN   HYDROmorphone 1 mg Q3H PRN   LORazepam 1 mg Q1H PRN     VTE Pharmacologic Prophylaxis: Heparin  VTE Mechanical Prophylaxis: sequential compression device  Invasive lines and devices: Invasive Devices     Peripherally Inserted Central Catheter Line            PICC Line 51/21/28 Right Basilic 12 days          Drain            Closed/Suction Drain Left LLQ Bulb 19 Fr  7 days    Closed/Suction Drain Left LUQ Bulb 19 Fr  7 days    Closed/Suction Drain Right RLQ Bulb 19 Fr  7 days    Closed/Suction Drain Right RUQ Accordion 19 Fr  7 days    Ventriculostomy/Subdural Ventricular drainage catheter Right 7 days    Chest Tube 1 Right Pleural 10 Fr  1 day    Chest Tube 2 Left Pleural 10 Fr  1 day    Closed/Suction Drain Left Abdomen Bulb 12 Fr  1 day    NG/OG/Enteral Tube Enteral Feeding Tube Right nares 1 day                   Code Status: Level 1 - Full Code    Portions of the record may have been created with voice recognition software  Occasional wrong word or "sound a like" substitutions may have occurred due to the inherent limitations of voice recognition software  Read the chart carefully and recognize, using context, where substitutions have occurred      Carlos Alberto Galicia PA-C

## 2018-02-02 NOTE — CONSULTS
Progress Note - Anesthesia Acute Pain Management    Ronna Hairston 36 y o  female MRN: [id number]  Unit/Bed#: ICU 10 Encounter: [id number]      Assessment:   Principal Problem:    Gastric leak  Active Problems:    Acute peritonitis    Idiopathic intracranial hypertension    S/P  shunt     (ventriculoperitoneal) shunt status    Murmur, cardiac    Refusal of blood product    Gastroesophageal reflux disease      Pain History  Current pain location(s): Abdomen and chest tube sites  Pain Scale:   5-8/10  Current Analgesic regimen:  Ketamine infusion (0 2mg/kg/hr), hydromorphone IV 0 5-1mg q3h PRN, lidocaine patches  24 hour history: Patient notes improved pain control since starting ketamine infusion  She rates her pain at baseline an 8/10 and when she gets a PRN dose of hydromorphone it goes to 5/10 which she says is tolerable  She receives relief from PRN hydromorphone for 2 hours and is uncomfortable while waiting another hour for her next dose when needed  Meds/Allergies     Allergies   Allergen Reactions    Benadryl [Diphenhydramine] Swelling    Phenergan [Promethazine] Hives       Objective     Temp:  [98 3 °F (36 8 °C)-99 1 °F (37 3 °C)] 98 3 °F (36 8 °C)  HR:  [] 88  Resp:  [13-39] 17  BP: (151-179)/(81-99) 166/90      Intake/Output Summary (Last 24 hours) at 02/02/18 1140  Last data filed at 02/02/18 0728   Gross per 24 hour   Intake          2098 44 ml   Output             2284 ml   Net          -185 56 ml                 Physical Exam: /90   Pulse 88   Temp 98 3 °F (36 8 °C) (Oral)   Resp 17   Ht 5' 4" (1 626 m)   Wt 70 7 kg (155 lb 13 8 oz)   LMP  (LMP Unknown)   SpO2 98%   Breastfeeding?  No   BMI 26 75 kg/m²   Gen: Well appearing and well nourished, NAD  CV: RRR, +s1/s2, no M/R/G  Pul: Lungs CTAB, chest tube insitu  Abd: Not examined   Ext:  No cyanosis or edema    Lab Results:  Lab Results   Component Value Date    WBC 23 32 (H) 02/02/2018    HGB 9 0 (L) 02/02/2018    HCT 28 1 (L) 02/02/2018    MCV 92 02/02/2018     (H) 02/02/2018     Lab Results   Component Value Date    GLUCOSE 159 (H) 02/02/2018    CALCIUM 9 0 02/02/2018     02/02/2018    K 3 6 02/02/2018    CO2 35 (H) 02/02/2018     02/02/2018    BUN 18 02/02/2018    CREATININE 0 27 (L) 02/02/2018     Plan:   Patient is receiving adequate pain control with ketamine infusion and intermittent doses of hydromorphone for breakthrough pain, however hydromorphone is only lasting 2 hours  Once she is able to take PO medications we can wean ketamine infusion and IV hydromorphone    1) Continue ketamine infusion  2) Increase frequency of IV hydromorphone to 0 5-1mg IV q2h PRN for breakthrough pain  3) Continue lidocaine patches      SIGNATURE: Ramón Salas MD  DATE: February 2, 2018  TIME: 11:40 AM

## 2018-02-02 NOTE — PLAN OF CARE
Problem: PHYSICAL THERAPY ADULT  Goal: Performs mobility at highest level of function for planned discharge setting  See evaluation for individualized goals  Treatment/Interventions: Functional transfer training, LE strengthening/ROM, Therapeutic exercise, Endurance training, Bed mobility, Spoke to nursing  Equipment Recommended:  (R/O LEAST RESTRICTIVE AD )       See flowsheet documentation for full assessment, interventions and recommendations  Outcome: Not Progressing  Prognosis: Good  Problem List: Decreased strength, Decreased endurance, Impaired balance, Decreased mobility, Decreased skin integrity, Pain  Assessment: PT INITIATED TREATMENT SESSION IN ORDER TO ASSIST PATIENT IN ACHIEVING GOALS TO IMPROVE STRENGTH AND OVERALL ACTIVITY TOLERANCE  PATIENT DECLINING TRIAL OF OOB TRANSFER OR EVEN BED MOBILITY  PATIENT DOES NOT EXPRESS ANY THERAPY GOALS BESIDES "I CAN SPELL THERAPY"  PATIENT APPEARS TO PRESENT WITH SELF LIMITING BEHAVIORS AND REQUIRES FREQUENT ENCOURAGEMENT TO PARTICIPATE IN THERAPY SERVICES  IN SUPINE POSITION PATIENT PARTICIPATED IN ACTIVE AND ACTIVE ASSISTED B/L LE AND UE THER-EX  PATIENT PERFORMED SHOULDER SHRUGS, ELBOW FLEXION/EXTENSION, AND SHOULDER FLEXION X 10 REPS  SHE PERFORMED B/L LE THER-EX AS DESCRIBED ABOVE  POST PT TREATMENT SESSIONS PATIENT'S B/L LE WERE POSITIONED ON TWO PILLOWS TO ASSIST IN REDUCED B/L LE EDEMA  PT SPOKE WITH PT CLINICAL DIRECTOR TO ORDER KREG CATALYST BED TO ASSIST WITH OOB MOBILITY AND STANDING IN FUTURE SESSIONS  ALSO SPOKE WITH RN TO RECOMMEND UTILIZATION OF COMPRESSION STOCKINGS  NEXT SESSION PLAN TO TRIAL SUPNIE-->SIT TRANSFER AS TOLERATED  RECOMMENDATION FOR STR REMAINS APPROPRIATE  PATIENT WILL BENEFIT FROM CONTINUED SKILLED INPT PT THIS ADMISSION TO ACHIEVE MAXIMAL FUNCTION AND SAFETY     Barriers to Discharge: Decreased caregiver support, Inaccessible home environment     Recommendation: (S) Short-term skilled PT     PT - OK to Discharge: (S) Yes (TO STR WHEN MED FREDERIC )    See flowsheet documentation for full assessment

## 2018-02-03 ENCOUNTER — APPOINTMENT (INPATIENT)
Dept: RADIOLOGY | Facility: HOSPITAL | Age: 43
DRG: 711 | End: 2018-02-03
Payer: COMMERCIAL

## 2018-02-03 PROBLEM — D31.31 CHOROIDAL NEVUS, RIGHT EYE: Status: ACTIVE | Noted: 2018-02-03

## 2018-02-03 LAB
ANION GAP SERPL CALCULATED.3IONS-SCNC: 4 MMOL/L (ref 4–13)
ANISOCYTOSIS BLD QL SMEAR: PRESENT
BACTERIA SPEC BFLD CULT: NO GROWTH
BACTERIA SPEC BFLD CULT: NO GROWTH
BASOPHILS # BLD MANUAL: 0 THOUSAND/UL (ref 0–0.1)
BASOPHILS NFR MAR MANUAL: 0 % (ref 0–1)
BUN SERPL-MCNC: 19 MG/DL (ref 5–25)
CALCIUM SERPL-MCNC: 8.6 MG/DL (ref 8.3–10.1)
CHLORIDE SERPL-SCNC: 100 MMOL/L (ref 100–108)
CO2 SERPL-SCNC: 35 MMOL/L (ref 21–32)
CREAT SERPL-MCNC: 0.27 MG/DL (ref 0.6–1.3)
EOSINOPHIL # BLD MANUAL: 0.77 THOUSAND/UL (ref 0–0.4)
EOSINOPHIL NFR BLD MANUAL: 3 % (ref 0–6)
ERYTHROCYTE [DISTWIDTH] IN BLOOD BY AUTOMATED COUNT: 15.6 % (ref 11.6–15.1)
GFR SERPL CREATININE-BSD FRML MDRD: 146 ML/MIN/1.73SQ M
GLUCOSE SERPL-MCNC: 139 MG/DL (ref 65–140)
GLUCOSE SERPL-MCNC: 140 MG/DL (ref 65–140)
GLUCOSE SERPL-MCNC: 149 MG/DL (ref 65–140)
GLUCOSE SERPL-MCNC: 155 MG/DL (ref 65–140)
GLUCOSE SERPL-MCNC: 157 MG/DL (ref 65–140)
GLUCOSE SERPL-MCNC: 198 MG/DL (ref 65–140)
GRAM STN SPEC: NORMAL
HCT VFR BLD AUTO: 27.9 % (ref 34.8–46.1)
HGB BLD-MCNC: 9 G/DL (ref 11.5–15.4)
LYMPHOCYTES # BLD AUTO: 1.02 THOUSAND/UL (ref 0.6–4.47)
LYMPHOCYTES # BLD AUTO: 4 % (ref 14–44)
MAGNESIUM SERPL-MCNC: 1.9 MG/DL (ref 1.6–2.6)
MCH RBC QN AUTO: 29.7 PG (ref 26.8–34.3)
MCHC RBC AUTO-ENTMCNC: 32.3 G/DL (ref 31.4–37.4)
MCV RBC AUTO: 92 FL (ref 82–98)
METAMYELOCYTES NFR BLD MANUAL: 2 % (ref 0–1)
MONOCYTES # BLD AUTO: 0.77 THOUSAND/UL (ref 0–1.22)
MONOCYTES NFR BLD: 3 % (ref 4–12)
NEUTROPHILS # BLD MANUAL: 22.48 THOUSAND/UL (ref 1.85–7.62)
NEUTS BAND NFR BLD MANUAL: 1 % (ref 0–8)
NEUTS SEG NFR BLD AUTO: 87 % (ref 43–75)
NRBC BLD AUTO-RTO: 0 /100 WBCS
PHOSPHATE SERPL-MCNC: 3.3 MG/DL (ref 2.7–4.5)
PLATELET # BLD AUTO: 648 THOUSANDS/UL (ref 149–390)
PLATELET BLD QL SMEAR: ABNORMAL
PMV BLD AUTO: 8.7 FL (ref 8.9–12.7)
POLYCHROMASIA BLD QL SMEAR: PRESENT
POTASSIUM SERPL-SCNC: 4.1 MMOL/L (ref 3.5–5.3)
RBC # BLD AUTO: 3.03 MILLION/UL (ref 3.81–5.12)
RBC MORPH BLD: PRESENT
SODIUM SERPL-SCNC: 139 MMOL/L (ref 136–145)
WBC # BLD AUTO: 25.55 THOUSAND/UL (ref 4.31–10.16)

## 2018-02-03 PROCEDURE — 80048 BASIC METABOLIC PNL TOTAL CA: CPT | Performed by: EMERGENCY MEDICINE

## 2018-02-03 PROCEDURE — 99233 SBSQ HOSP IP/OBS HIGH 50: CPT | Performed by: INTERNAL MEDICINE

## 2018-02-03 PROCEDURE — 82948 REAGENT STRIP/BLOOD GLUCOSE: CPT

## 2018-02-03 PROCEDURE — 99231 SBSQ HOSP IP/OBS SF/LOW 25: CPT | Performed by: THORACIC SURGERY (CARDIOTHORACIC VASCULAR SURGERY)

## 2018-02-03 PROCEDURE — 83735 ASSAY OF MAGNESIUM: CPT | Performed by: EMERGENCY MEDICINE

## 2018-02-03 PROCEDURE — 99233 SBSQ HOSP IP/OBS HIGH 50: CPT | Performed by: EMERGENCY MEDICINE

## 2018-02-03 PROCEDURE — C9113 INJ PANTOPRAZOLE SODIUM, VIA: HCPCS | Performed by: NURSE PRACTITIONER

## 2018-02-03 PROCEDURE — 74018 RADEX ABDOMEN 1 VIEW: CPT

## 2018-02-03 PROCEDURE — 99024 POSTOP FOLLOW-UP VISIT: CPT | Performed by: NEUROLOGICAL SURGERY

## 2018-02-03 PROCEDURE — 84100 ASSAY OF PHOSPHORUS: CPT | Performed by: EMERGENCY MEDICINE

## 2018-02-03 PROCEDURE — 85027 COMPLETE CBC AUTOMATED: CPT | Performed by: EMERGENCY MEDICINE

## 2018-02-03 PROCEDURE — 85007 BL SMEAR W/DIFF WBC COUNT: CPT | Performed by: EMERGENCY MEDICINE

## 2018-02-03 RX ORDER — ACETAMINOPHEN 650 MG/1
325 SUPPOSITORY RECTAL EVERY 4 HOURS PRN
Status: DISCONTINUED | OUTPATIENT
Start: 2018-02-03 | End: 2018-02-25

## 2018-02-03 RX ORDER — MAGNESIUM SULFATE 1 G/100ML
1 INJECTION INTRAVENOUS ONCE
Status: COMPLETED | OUTPATIENT
Start: 2018-02-03 | End: 2018-02-03

## 2018-02-03 RX ADMIN — Medication 3 G: at 15:43

## 2018-02-03 RX ADMIN — FLUCONAZOLE 400 MG: 2 INJECTION INTRAVENOUS at 16:28

## 2018-02-03 RX ADMIN — Medication 3 G: at 03:49

## 2018-02-03 RX ADMIN — HYDROMORPHONE HYDROCHLORIDE 1 MG: 1 INJECTION, SOLUTION INTRAMUSCULAR; INTRAVENOUS; SUBCUTANEOUS at 03:47

## 2018-02-03 RX ADMIN — HYDROMORPHONE HYDROCHLORIDE 1 MG: 1 INJECTION, SOLUTION INTRAMUSCULAR; INTRAVENOUS; SUBCUTANEOUS at 12:32

## 2018-02-03 RX ADMIN — HYDROMORPHONE HYDROCHLORIDE 0.5 MG: 1 INJECTION, SOLUTION INTRAMUSCULAR; INTRAVENOUS; SUBCUTANEOUS at 05:52

## 2018-02-03 RX ADMIN — HYDROMORPHONE HYDROCHLORIDE 1 MG: 1 INJECTION, SOLUTION INTRAMUSCULAR; INTRAVENOUS; SUBCUTANEOUS at 22:46

## 2018-02-03 RX ADMIN — Medication 3 G: at 09:52

## 2018-02-03 RX ADMIN — HYDROMORPHONE HYDROCHLORIDE 1 MG: 1 INJECTION, SOLUTION INTRAMUSCULAR; INTRAVENOUS; SUBCUTANEOUS at 18:39

## 2018-02-03 RX ADMIN — MAGNESIUM SULFATE HEPTAHYDRATE 1 G: 1 INJECTION, SOLUTION INTRAVENOUS at 11:35

## 2018-02-03 RX ADMIN — ACETAMINOPHEN 325 MG: 650 SUPPOSITORY RECTAL at 00:47

## 2018-02-03 RX ADMIN — HEPARIN SODIUM 5000 UNITS: 5000 INJECTION, SOLUTION INTRAVENOUS; SUBCUTANEOUS at 14:30

## 2018-02-03 RX ADMIN — HEPARIN SODIUM 5000 UNITS: 5000 INJECTION, SOLUTION INTRAVENOUS; SUBCUTANEOUS at 05:52

## 2018-02-03 RX ADMIN — PANTOPRAZOLE SODIUM 40 MG: 40 INJECTION, POWDER, FOR SOLUTION INTRAVENOUS at 09:47

## 2018-02-03 RX ADMIN — HYDROMORPHONE HYDROCHLORIDE 1 MG: 1 INJECTION, SOLUTION INTRAMUSCULAR; INTRAVENOUS; SUBCUTANEOUS at 15:50

## 2018-02-03 RX ADMIN — Medication 3 G: at 22:18

## 2018-02-03 RX ADMIN — HYDROMORPHONE HYDROCHLORIDE 1 MG: 1 INJECTION, SOLUTION INTRAMUSCULAR; INTRAVENOUS; SUBCUTANEOUS at 09:47

## 2018-02-03 RX ADMIN — HEPARIN SODIUM 5000 UNITS: 5000 INJECTION, SOLUTION INTRAVENOUS; SUBCUTANEOUS at 22:18

## 2018-02-03 RX ADMIN — LIDOCAINE 2 PATCH: 50 PATCH TOPICAL at 09:47

## 2018-02-03 RX ADMIN — HYDROMORPHONE HYDROCHLORIDE 0.5 MG: 1 INJECTION, SOLUTION INTRAMUSCULAR; INTRAVENOUS; SUBCUTANEOUS at 00:47

## 2018-02-03 RX ADMIN — INSULIN LISPRO 1 UNITS: 100 INJECTION, SOLUTION INTRAVENOUS; SUBCUTANEOUS at 18:35

## 2018-02-03 RX ADMIN — THIAMINE HYDROCHLORIDE: 100 INJECTION, SOLUTION INTRAMUSCULAR; INTRAVENOUS at 22:18

## 2018-02-03 RX ADMIN — MIRTAZAPINE 15 MG: 15 TABLET, ORALLY DISINTEGRATING ORAL at 22:18

## 2018-02-03 RX ADMIN — KETAMINE HYDROCHLORIDE 0.2 MG/KG/HR: 50 INJECTION, SOLUTION INTRAMUSCULAR; INTRAVENOUS at 01:03

## 2018-02-03 RX ADMIN — INSULIN LISPRO 1 UNITS: 100 INJECTION, SOLUTION INTRAVENOUS; SUBCUTANEOUS at 05:59

## 2018-02-03 RX ADMIN — KETAMINE HYDROCHLORIDE 0.2 MG/KG/HR: 50 INJECTION, SOLUTION INTRAMUSCULAR; INTRAVENOUS at 21:45

## 2018-02-03 RX ADMIN — HYDROMORPHONE HYDROCHLORIDE 0.5 MG: 1 INJECTION, SOLUTION INTRAMUSCULAR; INTRAVENOUS; SUBCUTANEOUS at 20:43

## 2018-02-03 NOTE — PROGRESS NOTES
Progress Note - General Surgery  Erica Dumas 36 y o  female MRN: [id number]  Unit/Bed#: ICU 10-01 Encounter: [id number]    Assessment:  36y o -year-old female with gastric perforation s/p hiatal hernia repair at outside hospital, s/p esophageal stent placement, exploratory laparotomy and washout  Has had persistent esophageal/gastric leak on imaging  Had bilateral pleural effusions which were drained  Perisplenic fluid collection was drained  Plan:  1  Gastric perforation   - maintain NPO, TPN   - continue drains   - abx per ID is unasyn / fluconazole from cultures growing enterococcus    - PCA-D per pain mgt   - ketamine gtt wean    2  Leukocytosis   - persistent despite antibiotics   - drainage decreasing with nurse having trouble flushing tubes, possible drains are clogged-->advise CT scan chest / abdomen / pelvis w/ PO and IV contrast     3  Externalized  shunt   - possible removal next week by neurosurgery    4  DVT Prophylaxis   - SQH   - SCDs    5  Disposition   - ICU while  shunt is externalized    Kathy Hendrix MD PGY-4  4:50 AM  02/03/18      Subjective:  Pain is still present  No nausea  No flatus  Opthalmology saw patient for choroidal nevus right eye and pseudotumor cerebri with increased intraocular pressure; recommended routine follow-up       Objective:  Patient Vitals for the past 24 hrs:   BP Temp Temp src Pulse Resp SpO2   02/03/18 0427 164/88 99 1 °F (37 3 °C) Oral 88 13 98 %   02/03/18 0355 - - - 90 (!) 30 95 %   02/03/18 0350 167/91 - - 96 (!) 37 91 %   02/03/18 0200 170/95 - - 94 (!) 24 94 %   02/03/18 0100 (!) 178/95 - - (!) 106 (!) 25 99 %   02/03/18 0000 169/88 100 3 °F (37 9 °C) Oral 90 14 100 %   02/02/18 2300 166/94 - - 92 (!) 26 99 %   02/02/18 2205 (!) 179/102 - - 98 - 99 %   02/02/18 2014 (!) 173/96 99 2 °F (37 3 °C) Oral 92 (!) 31 98 %   02/02/18 1800 (!) 165/102 - - 90 (!) 35 100 %   02/02/18 1700 (!) 178/96 - - 94 (!) 28 100 %   02/02/18 1600 160/97 98 6 °F (37 °C) Oral 90 (!) 25 99 %   02/02/18 1500 170/94 - - 92 (!) 31 98 %   02/02/18 1400 167/95 - - 92 (!) 33 98 %   02/02/18 1300 147/82 - - 84 17 99 %   02/02/18 1200 163/89 98 6 °F (37 °C) Oral 86 (!) 25 98 %   02/02/18 1100 166/82 - - 90 (!) 31 98 %   02/02/18 1000 163/91 - - 90 (!) 30 97 %   02/02/18 0900 159/91 - - 86 (!) 24 98 %   02/02/18 0800 166/86 98 4 °F (36 9 °C) Oral 94 (!) 36 98 %   02/02/18 0700 166/90 - - 88 17 98 %   02/02/18 0600 158/94 - - 86 16 98 %   02/02/18 0500 153/85 - - 92 13 99 %          Diet Orders            Start     Ordered    01/31/18 1709  Diet NPO  Diet effective now     Comments:  NOTHING NPO   Question Answer Comment   Diet Type NPO    RD to adjust diet per protocol?  No        01/31/18 1709        Intake/Output Summary (Last 24 hours) at 02/03/18 0450  Last data filed at 02/03/18 0420   Gross per 24 hour   Intake          3653 61 ml   Output             3370 ml   Net           283 61 ml   UOP 2 4  R ADENIKE 3 serous  R ADENIKE 8 serous  L ADENIKE 8 serous  L ADENIKE 13 serous  L ADENIKE spleen 65 serosanguinous  CT L 140 serous -AL sxn  CT R 140 serous -AL sxn     Physical Exam:  General: NAD  HEENT: externalized  shunt  Cardiovascular: RRR  Respiratory: breath sounds b/l  Abdomen: soft, NT, incision c/d/i  Extremities: no edema    Medications:    Current Facility-Administered Medications:  acetaminophen 325 mg Rectal Q4H PRN Shai Fuentes MD    Adult TPN (CUSTOM BASE/CUSTOM ELECTROLYTE)  Intravenous Continuous James Young PA-C Last Rate: 69 6 mL/hr at 02/02/18 2112   ampicillin-sulbactam 3 g Intravenous Q6H Rola Lugo MD Last Rate: Stopped (02/03/18 0420)   fluconazole 400 mg Intravenous Q24H ABNER Cormier Last Rate: Stopped (02/02/18 1830)   glycopyrrolate 0 2 mg Intravenous Q4H PRN Werner Celaya MD    haloperidol lactate 2 mg Intramuscular Q30 Min PRN Werner Celaya MD    heparin (porcine) 5,000 Units Subcutaneous [facility] ABNER Tse    HYDROmorphone 0 5 mg Intravenous Q3H PRN Bassam Valdez PA-C    HYDROmorphone 1 mg Intravenous Q3H PRN Sukumar Davis MD    insulin lispro 1-5 Units Subcutaneous Q6H [address] Rose Mary Mcconnell PA-C    iohexol 50 mL Oral 90 min pre-procedure Jaki Lara MD    ketamine 0 2 mg/kg/hr Intravenous Continuous Radhika Moreno MD Last Rate: 0 2 mg/kg/hr (02/03/18 0103)   lidocaine 2 patch Transdermal Daily Christos Shoemaker PA-C    LORazepam 1 mg Intramuscular Q1H PRN Radhika Moreno MD    mirtazapine 15 mg Oral HS Naylachristofer Mcconnell PA-C    pantoprazole 40 mg Intravenous Q24H [address] ABNER Hogue      Adult TPN (CUSTOM BASE/CUSTOM ELECTROLYTE)  Last Rate: 69 6 mL/hr at 02/02/18 2112   ketamine 0 2 mg/kg/hr Last Rate: 0 2 mg/kg/hr (02/03/18 0103)     acetaminophen 325 mg Q4H PRN   glycopyrrolate 0 2 mg Q4H PRN   haloperidol lactate 2 mg Q30 Min PRN   HYDROmorphone 0 5 mg Q3H PRN   HYDROmorphone 1 mg Q3H PRN   LORazepam 1 mg Q1H PRN     Laboratory results:   CBC:   Lab Results   Component Value Date    WBC 25 55 (H) 02/03/2018    HGB 9 0 (L) 02/03/2018    HCT 27 9 (L) 02/03/2018    MCV 92 02/03/2018     (H) 02/03/2018    MCH 29 7 02/03/2018    MCHC 32 3 02/03/2018    RDW 15 6 (H) 02/03/2018    MPV 8 7 (L) 02/03/2018    NRBC 0 02/03/2018   , CMP:   Lab Results   Component Value Date     02/03/2018    K 4 1 02/03/2018     02/03/2018    CO2 35 (H) 02/03/2018    ANIONGAP 4 02/03/2018    BUN 19 02/03/2018    CREATININE 0 27 (L) 02/03/2018    GLUCOSE 139 02/03/2018    CALCIUM 8 6 02/03/2018    EGFR 146 02/03/2018   , Coagulation: No results found for: PT, INR, APTT, Urinalysis: No results found for: COLORU, CLARITYU, SPECGRAV, PHUR, LEUKOCYTESUR, NITRITE, PROTEINUA, GLUCOSEU, KETONESU, BILIRUBINUR, BLOODU, Amylase: No results found for: AMYLASE, Lipase: No results found for: LIPASE    VTE Pharmacologic Prophylaxis: Heparin  VTE Mechanical Prophylaxis: sequential compression device

## 2018-02-03 NOTE — PROGRESS NOTES
Progress Note - Neurosurgery   Michelle Salas 36 y o  female MRN: [id number]  Unit/Bed#: ICU 10 Encounter: [id number]    Assessment:    3 41-year-old female transfer [facility] for esophageal stent status post upper gastric injury during laparoscopic paraesophageal hernia repair  Patient developed sepsis and required multiple operative procedures  Upon transfer to [facility], she underwent a esophageal stent and externalization right ventriculoperitoneal shunt, POD# 9; shunt clamped  2  History of ventriculoperitoneal shunt for idiopathic intracranial hypertension (originally placed May 30, 2017)  3  ophthalmology evaluation noted No optic nerve edemaPain control per primary team   4  Shunt series:  Intact catheter with Codman Hakim shunt set to 70mm [id number] upper extremity occlusive thrombophlebitis cephalic vein  5  Final CSF cx from 1/30 no growth  6  Leukocytosis continues to increase, and fluid cultures 1/31 remain positive  7  Peritonitis   8  Gastric perforation status post repair    Plan:  · Exam reveals diffuse weakness  Alert and slow to answer questions  Following commands  Dressing intact  · Externalized shunt clamped  Monitor exam    · Tentative plan for removal of shunt  Given extensive abdominal infection, recommend against return of ventricular catheter into abdominal cavity  Exam grossly stable since clamp  No papilledema noted by ophtho  Timing to be discussed with primary team - if [facility] feel patient able/safe, could proceed to OR on 2/5 for removal  Would require GETA  · SSC following -  ADENIKE drains x5  Trend white count (17 85) 23 32  · Repeat CT scan CAP w 1/30/18 with persistent leak  · Status post IR perisplenic drain placement bilateral chest tubes for pleural effusion along with Keofed placement    GI recommend defer use of Keofed and recommend continue TPN  · Chest tube placed to suction until cultures negative  · Infectious disease following - on IV Unasyn anticipated 4-6 weeks  On fluconazole pending most recent abscess culture  Follow-up culture of perisplenic collection  · Ongoing medical management per primary team   · DVT PPX:  Heparin and SCDs on during exam   · Will continue to follow  Subjective/Objective   Chief Complaint: follow-up externalization shunt    Subjective:  Complaining of diffuse abdominal pain control medications  Denies headache or vision changes      MEDS:      Current Facility-Administered Medications:     acetaminophen (TYLENOL) rectal suppository 325 mg, 325 mg, Rectal, Q4H PRN, Nick Tijerina MD, 325 mg at 02/03/18 0047    Adult 3-in-1 TPN (custom base / custom electrolytes), , Intravenous, Continuous, Evert Gray PA-C, Last Rate: 69 6 mL/hr at 02/02/18 2112    Adult 3-in-1 TPN (custom base / custom electrolytes), , Intravenous, Continuous, Vineet Decker DO    ampicillin-sulbactam (UNASYN) 3 g in sodium chloride 0 9 % 100 mL IVPB, 3 g, Intravenous, Q6H, Cathi Downs MD, Last Rate: 200 mL/hr at 02/03/18 0952, 3 g at 02/03/18 0952    fluconazole (DIFLUCAN) IVPB (premix) 400 mg, 400 mg, Intravenous, Q24H, ABNER Tse, Stopped at 02/02/18 1830    glycopyrrolate (ROBINUL) injection 0 2 mg, 0 2 mg, Intravenous, Q4H PRN, Bety Woodward MD    haloperidol lactate (HALDOL) injection 2 mg, 2 mg, Intramuscular, Q30 Min PRN, Bety Woodward MD    heparin (porcine) subcutaneous injection 5,000 Units, 5,000 Units, Subcutaneous, Q8H [facility], ABNER Tse, 5,000 Units at 02/03/18 0552    HYDROmorphone (DILAUDID) injection 0 5 mg, 0 5 mg, Intravenous, Q3H PRN, Joseline Chen PA-C, 0 5 mg at 02/03/18 0552    HYDROmorphone (DILAUDID) injection 1 mg, 1 mg, Intravenous, Q3H PRN, Zee Menjivar MD, 1 mg at 02/03/18 0947    insulin lispro (HumaLOG) 100 units/mL subcutaneous injection 1-5 Units, 1-5 Units, Subcutaneous, Q6H [facility], 1 Units at 02/03/18 0559 **AND** Fingerstick Glucose (POCT), , , Q6H, Evert Gray, AURELIO    iohexol (OMNIPAQUE) 240 MG/ML solution 50 mL, 50 mL, Oral, 90 min pre-procedure, Jus Archer MD    ketamine 250 mg (STANDARD CONCENTRATION) IV in sodium chloride 0 9% 250 mL, 0 2 mg/kg/hr, Intravenous, Continuous, Kyra Posadas MD, Last Rate: 14 1 mL/hr at 02/03/18 0103, 0 2 mg/kg/hr at 02/03/18 0103    lidocaine (LIDODERM) 5 % patch 2 patch, 2 patch, Transdermal, Daily, Terrill Severin, PA-C, 2 patch at 02/03/18 0947    LORazepam (ATIVAN) 2 mg/mL injection 1 mg, 1 mg, Intramuscular, Q1H PRN, Kyra Posadas MD    magnesium sulfate IVPB (premix) SOLN 1 g, 1 g, Intravenous, Once, Clevessteph Dumont,     mirtazapine (REMERON SOL-TAB) dispersible tablet 15 mg, 15 mg, Oral, HS, Nayla Mcconnell PA-C, 15 mg at 02/02/18 2214    pantoprazole (PROTONIX) injection 40 mg, 40 mg, Intravenous, Q24H MYRNA, ABNER Tse, 40 mg at 02/03/18 0947      Objective:     Physical Exam:  Vitals: Blood pressure 153/69, pulse 94, temperature 97 7 °F (36 5 °C), temperature source Oral, resp  rate (!) 33, height 5' 4" (1 626 m), weight 70 7 kg (155 lb 13 8 oz), SpO2 94 %, not currently breastfeeding  ,Body mass index is 26 75 kg/m²  Tmax 100 3 F overnight  I/O       01/31 0701 - 02/01 0700 02/01 0701 - 02/02 0700 02/02 0701 - 02/03 0700    P  O        I V  (mL/kg) 3059 8 (43 3) 450 2 (6 4) 84 6 (1 2)    NG/GT 0 0     IV Piggyback 700 650     TPN 1336 1678 1 415 2    Total Intake(mL/kg) 5095 8 (72 1) 2778 3 (39 3) 499 8 (7 1)    Urine (mL/kg/hr) 2288 (1 3) 550 (0 3) 825 (1 8)    Emesis/NG output 0 (0) 0 (0)     Drains 484 (0 3) 104 (0 1)     Chest Tube 1795 (1 1) 1230 (0 7)     Total Output 4567 1884 825    Net +528 8 +894 3 -325  2           Unmeasured Urine Occurrence 2 x 2 x             General appearance:  Drowsy, appears stated age, cooperative and no distress  Head: Normocephalic, without obvious abnormality, atraumatic  Eyes: EOMI good upgaze,   Neck: supple, symmetrical, trachea midline   Lungs: non labored breathing  Heart: regular heart rate  Neurologic:   Mental status:  Drowsy, oriented, thought content appropriate, appropriate slow responses  Cranial nerves: grossly intact (Cranial nerves II-XII)  Sensory: normal to LT  Motor: moving all extremities with diffuse weakness proximal greater than distal      Lab Results:    Results from last 7 days  Lab Units 02/03/18 0438 02/02/18 0415 02/01/18  0517   WBC Thousand/uL 25 55* 23 32* 17 85*   HEMOGLOBIN g/dL 9 0* 9 0* 7 9*   HEMATOCRIT % 27 9* 28 1* 24 2*   PLATELETS Thousands/uL 648* 730* 650*   MONO PCT MAN % 3* 2* 3*       Results from last 7 days  Lab Units 02/03/18 0438 02/02/18  0415 02/01/18  0517   SODIUM mmol/L 139 139 137   POTASSIUM mmol/L 4 1 3 6 4 6   CHLORIDE mmol/L 100 101 100   CO2 mmol/L 35* 35* 33*   BUN mg/dL 19 18 14   CREATININE mg/dL 0 27* 0 27* 0 38*   CALCIUM mg/dL 8 6 9 0 8 8   TOTAL PROTEIN g/dL  --  4 9*  --    BILIRUBIN TOTAL mg/dL  --  0 38  --    ALK PHOS U/L  --  104  --    ALT U/L  --  15  --    AST U/L  --  15  --    GLUCOSE RANDOM mg/dL 139 159* 401*       Results from last 7 days  Lab Units 02/03/18  0438 02/02/18  0415 02/01/18  0517   MAGNESIUM mg/dL 1 9 1 9 1 9       Results from last 7 days  Lab Units 02/03/18 0438 02/02/18  0415 02/01/18  0517   PHOSPHORUS mg/dL 3 3 2 8 3 6         No results found for: TROPONINT  ABG:  Lab Results   Component Value Date    PHART 7 484 (H) 01/25/2018    GWC9EZM 33 6 (L) 01/25/2018    PO2ART 165 1 (H) 01/25/2018    DIH1EHT 24 7 01/25/2018    BEART 1 6 01/25/2018    SOURCE Line, Arterial 01/25/2018       Imaging Studies: I have personally reviewed pertinent reports     and I have personally reviewed pertinent films in PACS    VTE Pharmacologic Prophylaxis: Heparin    VTE Mechanical Prophylaxis: sequential compression device

## 2018-02-03 NOTE — PROGRESS NOTES
Progress Note - Critical Care   Luis Mercadoman 36 y o  female MRN: [id number]  Unit/Bed#: ICU 10 Encounter: [id number]    Assessment: 35 y/o female with complicated PMHx with hydrocephalus with  shunt, Hx Prerna en y, and recent hernia repair who presents s/p ex-lap x2 and externalization of  shunt  Found to have further anastamotic leak on CT scan unamenable to further stenting  IR for drainage of abdominal collection, bilateral thoracentesis with placement of B/L CT and keofed placement  Per GI strict NPO will need to allow leak to heal with repeat CT and esophagram in next few weeks      Principal Problem:    Gastric leak  Active Problems:    Acute peritonitis    Idiopathic intracranial hypertension    S/P  shunt     (ventriculoperitoneal) shunt status    Murmur, cardiac    Refusal of blood product    Gastroesophageal reflux disease  Resolved Problems:    Peritonitis    Plan:   · Neuro: GCS 15 AAOx4  · Analgesia:  Ketamine drip 0 2  · Requiring PRN Dilaudid Q3hr on schedule  · Consider addition of dilaudid PCA per APS recs  · No continuous infusion, demand dose 0 2mg q10min max 1 2 mg/hr  · Sedation:  None  ·  shunt secondary to pseudotumor cerebri externalized 1/25:  Clamped at level of chest per neurosurgery if tolerated clamping will discuss probable removal rather than internalization   · CV:   · Murmur: TTE inconclusive   · Lung:   · B/L Pleural effusions: per thoracic surgery continue to suction until cultures negative  · Right CT 370cc serous fluid  · Left CT 520cc serous fluid   · GI:   · GERD: Protonix  · Bowel Regimen: Sennakot, PRN miralax   · Peritonitis 2/2 GE junction leak  · S/p EGD with stent, ex-lap x2 with drain placement  · Repeat cultures from perisplenic drain pending   · Drains   · RUQ: 10cc  · RLQ 8cc  · LUQ 0cc  · LLQ 8 cc  · Perisplenic 60cc  · FEN:   · Fluids no maintenance   · Electrolytes - Monitor/trend - replete based on deficiencies   · Nutrition: will reorder TPN   · : · No active issues, continue to trend Cr   · UOP 1 0cc/kg/hr  · ID:   · Abdominal Collection: Continue Unasyn day 7 fluconazole day 3 pending further culture results, appreciate ID input    · Leukocytosis stable, persistently afebrile   · Cultures from perisplenic drain pending   · Heme:   · Hgb stable at 9, blood management program   · PPX: SQH  · Lab holiday tomorrow   · Endo:   · Hyperglycemia: SSI algorithm 2   · Msk/Skin:   · Turn frequently and reposition   · Disposition: ICU level care     ______________________________________________________________________    HPI/24hr events: Started on Ketamine drip, pain slightly better controlled  Still requiring heavy use of PRN Dilaudid  Lines  Right Basilic PICC  RUQ, RLQ, LUQ, LLQ ADENIKE drains  Perisplenic Drain  B/L Chest Tube  Keofed    Infusions  TPN  ______________________________________________________________________  Physical Exam:  Curran Agitation Sedation Scale (RASS): Alert and calm  Physical Exam   Constitutional: She is oriented to person, place, and time  She appears well-developed and well-nourished  HENT:   Head: Normocephalic and atraumatic  Eyes: Pupils are equal, round, and reactive to light  No scleral icterus  Neck: Normal range of motion  Neck supple  No JVD present  Cardiovascular: Normal rate and regular rhythm  Holosystolic murmur     Pulmonary/Chest: Effort normal and breath sounds normal    B/L chest tubes in place     Abdominal: Soft  Bowel sounds are normal  She exhibits no distension  There is tenderness  ADENIKE drains in place     Musculoskeletal: Normal range of motion  She exhibits no edema  Neurological: She is alert and oriented to person, place, and time  Skin: Skin is warm and dry   No erythema      ______________________________________________________________________  Temperature:   Temp (24hrs), Av °F (37 2 °C), Min:98 4 °F (36 9 °C), Max:100 3 °F (37 9 °C)    Current Temperature: 99 1 °F (37 3 °C)    Vitals:    02/03/18 0200 02/03/18 0350 02/03/18 0355 02/03/18 0427   BP: 170/95 167/91  164/88   Pulse: 94 96 90 88   Resp: (!) 24 (!) 37 (!) 30 13   Temp:    99 1 °F (37 3 °C)   TempSrc:    Oral   SpO2: 94% 91% 95% 98%   Weight:       Height:         Arterial Line BP: 166/82       Weights:   IBW: 54 7 kg    Body mass index is 26 75 kg/m²  Weight (last 2 days)     Date/Time   Weight    02/01/18 0545  70 7 (155 87)            Height: 5' 4" (162 6 cm)  Hemodynamic Monitoring:  N/A       Ventilator Settings:  2L Nasal Cannula   No results found for: PHART, IRZ6IVX, PO2ART, UKV7MDV, D1SWQYRF, BEART, SOURCE    Intake and Outputs:  I/O       01/31 0701 - 02/01 0700 02/01 0701 - 02/02 0700    I V  (mL/kg) 3059 8 (43 3) 261 (3 7)    NG/GT 0 0    IV Piggyback 700 450    TPN 1336 838 8    Total Intake(mL/kg) 5095 8 (72 1) 1549 8 (21 9)    Urine (mL/kg/hr) 2288 (1 3) 550 (0 3)    Emesis/NG output 0 (0) 0 (0)    Drains 484 (0 3) 86 (0 1)    Chest Tube 1795 (1 1) 890 (0 5)    Total Output 4567 1526    Net +528 8 +23 8          Unmeasured Urine Occurrence 2 x 1 x        UOP: 1 0cc/kg/hour   Nutrition:        Diet Orders            Start     Ordered    01/31/18 1709  Diet NPO  Diet effective now     Comments:  NOTHING NPO   Question Answer Comment   Diet Type NPO    RD to adjust diet per protocol?  No        01/31/18 1709          Labs:     Results from last 7 days  Lab Units 02/03/18  0438 02/02/18  0415 02/01/18  0517 01/31/18  0436   WBC Thousand/uL 25 55* 23 32* 17 85* 23 57*   HEMOGLOBIN g/dL 9 0* 9 0* 7 9* 9 0*   HEMATOCRIT % 27 9* 28 1* 24 2* 27 0*   PLATELETS Thousands/uL 648* 730* 650* 711*   MONO PCT MAN %  --  2* 3* 2*        Results from last 7 days  Lab Units 02/03/18  0438 02/02/18  0415 02/01/18  0517   SODIUM mmol/L 139 139 137   POTASSIUM mmol/L 4 1 3 6 4 6   CHLORIDE mmol/L 100 101 100   CO2 mmol/L 35* 35* 33*   BUN mg/dL 19 18 14   CREATININE mg/dL 0 27* 0 27* 0 38*   CALCIUM mg/dL 8 6 9 0 8 8   TOTAL PROTEIN g/dL  --  4 9*  --    BILIRUBIN TOTAL mg/dL  --  0 38  --    ALK PHOS U/L  --  104  --    ALT U/L  --  15  --    AST U/L  --  15  --    GLUCOSE RANDOM mg/dL 139 159* 401*       Results from last 7 days  Lab Units 02/03/18  0438 02/02/18  0415 02/01/18  0517   MAGNESIUM mg/dL 1 9 1 9 1 9       Results from last 7 days  Lab Units 02/03/18  0438 02/02/18  0415 02/01/18  0517   PHOSPHORUS mg/dL 3 3 2 8 3 6              No results found for: TROPONINI  Imaging: NA I have personally reviewed pertinent reports  and I have personally reviewed pertinent films in PACS  EKG: NA  Micro:  Blood Culture: No results found for: BLOODCX  Urine Culture: No results found for: URINECX  Sputum Culture: No components found for: SPUTUMCX  Wound Culure:   Lab Results   Component Value Date    WOUNDCULT 2+ Growth of Enterococcus faecalis (A) 01/25/2018       Lab Results   Component Value Date    WOUNDCULT 2+ Growth of Enterococcus faecalis (A) 01/25/2018     Allergies:    Allergies   Allergen Reactions    Benadryl [Diphenhydramine] Swelling    Phenergan [Promethazine] Hives     Medications:   Scheduled Meds:    Current Facility-Administered Medications:  acetaminophen 325 mg Rectal Q4H PRN Nba Camarena MD    Adult TPN (CUSTOM BASE/CUSTOM ELECTROLYTE)  Intravenous Continuous Rojas Andrew PA-C Last Rate: 69 6 mL/hr at 02/02/18 2112   ampicillin-sulbactam 3 g Intravenous Q6H Lakhwinder Byrne MD Last Rate: Stopped (02/03/18 0420)   fluconazole 400 mg Intravenous Q24H Debi Pulse, CRNP Last Rate: Stopped (02/02/18 1830)   glycopyrrolate 0 2 mg Intravenous Q4H PRN Maegan Alvarado MD    haloperidol lactate 2 mg Intramuscular Q30 Min PRN Maegan Alvarado MD    heparin (porcine) 5,000 Units Subcutaneous [facility] Debi Pulse, CRNP    HYDROmorphone 0 5 mg Intravenous Q3H PRN Nimisha Rand PA-C    HYDROmorphone 1 mg Intravenous Q3H PRN Mitch Pagan MD    insulin lispro 1-5 Units Subcutaneous Q6H [employer] AURELIO Mcconnell    iohexol 50 mL Oral 90 min pre-procedure Hannah Heaton MD    ketamine 0 2 mg/kg/hr Intravenous Continuous Harvey Dawson MD Last Rate: 0 2 mg/kg/hr (02/03/18 0103)   lidocaine 2 patch Transdermal Daily Noel Maldonado PA-C    LORazepam 1 mg Intramuscular Q1H PRN Harvey Dawson MD    mirtazapine 15 mg Oral HS Nayla R AURELIO Mcconnell    pantoprazole 40 mg Intravenous Q24H [address] Aretha ABNER Meehan      Continuous Infusions:    Adult TPN (CUSTOM BASE/CUSTOM ELECTROLYTE)  Last Rate: 69 6 mL/hr at 02/02/18 2112   ketamine 0 2 mg/kg/hr Last Rate: 0 2 mg/kg/hr (02/03/18 0103)     PRN Meds:    acetaminophen 325 mg Q4H PRN   glycopyrrolate 0 2 mg Q4H PRN   haloperidol lactate 2 mg Q30 Min PRN   HYDROmorphone 0 5 mg Q3H PRN   HYDROmorphone 1 mg Q3H PRN   LORazepam 1 mg Q1H PRN     VTE Pharmacologic Prophylaxis: Heparin  VTE Mechanical Prophylaxis: sequential compression device  Invasive lines and devices: Invasive Devices     Peripherally Inserted Central Catheter Line            PICC Line 94/56/16 Right Basilic 13 days          Drain            Closed/Suction Drain Left LLQ Bulb 19 Fr  8 days    Closed/Suction Drain Left LUQ Bulb 19 Fr  8 days    Closed/Suction Drain Right RLQ Bulb 19 Fr  8 days    Closed/Suction Drain Right RUQ Accordion 19 Fr  8 days    Ventriculostomy/Subdural Ventricular drainage catheter Right 8 days    Chest Tube 1 Right Pleural 10 Fr  2 days    Chest Tube 2 Left Pleural 10 Fr  2 days    Closed/Suction Drain Left Abdomen Bulb 12 Fr  2 days    NG/OG/Enteral Tube Enteral Feeding Tube Right nares 2 days                   Code Status: Level 1 - Full Code    Portions of the record may have been created with voice recognition software  Occasional wrong word or "sound a like" substitutions may have occurred due to the inherent limitations of voice recognition software  Read the chart carefully and recognize, using context, where substitutions have occurred      Adwoa Lawrence, DO            Progress Note - Critical Care   Ronna Hairston 36 y o  female MRN: [id number]  Unit/Bed#: ICU 10 Encounter: [id number]    Assessment: ***    Plan: ***         Neuro: ***                 CV: ***                 Lung: ***                 GI: ***                 FEN: ***                 : ***                 ID: ***                 Heme: ***                 Endo: ***                            Msk/Skin: ***                 Disposition: ***    Chief Complaint: ***    HPI/24hr events: ***    Physical Exam: ***    Vitals:    18 0200 18 0350 18 0355 18 0427   BP: 170/95 167/91  164/88   Pulse: 94 96 90 88   Resp: (!) 24 (!) 37 (!) 30 13   Temp:    99 1 °F (37 3 °C)   TempSrc:    Oral   SpO2: 94% 91% 95% 98%   Weight:       Height:         Arterial Line BP: 166/82  Arterial Line MAP (mmHg): 112 mmHg    Temperature:   Temp (24hrs), Av °F (37 2 °C), Min:98 4 °F (36 9 °C), Max:100 3 °F (37 9 °C)    Current: Temperature: 99 1 °F (37 3 °C)    Weights:   IBW: 54 7 kg    Body mass index is 26 75 kg/m²    Weight (last 2 days)     Date/Time   Weight    18 0545  70 7 (155 87)              Hemodynamic Monitoring:  {[facility] ICU HEMODYNAMIC MONITORIN}     Non-Invasive/Invasive Ventilation Settings:  Respiratory    Lab Data (Last 4 hours)    None         O2/Vent Data (Last 4 hours)    None              No results found for: PHART, RWZ7UZX, PO2ART, OJB8OXK, F6DRCUUW, BEART, SOURCE  {[facility] Oxygen Therapy:97668}    Intake and Outputs:  I/O        07 -  0700 701 -  0700    I V  (mL/kg) 450 2 (6 4) 350 (5)    NG/GT 0 0    IV Piggyback 650 750    TPN 1678 1 1525 1    Total Intake(mL/kg) 2778 3 (39 3) 2625 1 (37 1)    Urine (mL/kg/hr) 550 (0 3) 2405 (1 4)    Emesis/NG output 0 (0) 0 (0)    Drains 104 (0 1) 87 (0 1)    Chest Tube 1230 (0 7) 540 (0 3)    Total Output 1884 3032    Net +894 3 -406 9          Unmeasured Urine Occurrence 2 x 1 x        UOP: ***/hour Nutrition:        Diet Orders            Start     Ordered    01/31/18 1709  Diet NPO  Diet effective now     Comments:  NOTHING NPO   Question Answer Comment   Diet Type NPO    RD to adjust diet per protocol? No        01/31/18 1709        TF currently running at ***/hour with a goal of ***  Formula:***    Labs:     Results from last 7 days  Lab Units 02/03/18  0438 02/02/18  0415 02/01/18  0517 01/31/18  0436   WBC Thousand/uL 25 55* 23 32* 17 85* 23 57*   HEMOGLOBIN g/dL 9 0* 9 0* 7 9* 9 0*   HEMATOCRIT % 27 9* 28 1* 24 2* 27 0*   PLATELETS Thousands/uL 648* 730* 650* 711*   MONO PCT MAN %  --  2* 3* 2*      Results from last 7 days  Lab Units 02/03/18  0438 02/02/18  0415 02/01/18  0517   SODIUM mmol/L 139 139 137   POTASSIUM mmol/L 4 1 3 6 4 6   CHLORIDE mmol/L 100 101 100   CO2 mmol/L 35* 35* 33*   BUN mg/dL 19 18 14   CREATININE mg/dL 0 27* 0 27* 0 38*   CALCIUM mg/dL 8 6 9 0 8 8   TOTAL PROTEIN g/dL  --  4 9*  --    BILIRUBIN TOTAL mg/dL  --  0 38  --    ALK PHOS U/L  --  104  --    ALT U/L  --  15  --    AST U/L  --  15  --    GLUCOSE RANDOM mg/dL 139 159* 401*       Results from last 7 days  Lab Units 02/03/18  0438 02/02/18  0415 02/01/18  0517   MAGNESIUM mg/dL 1 9 1 9 1 9       Results from last 7 days  Lab Units 02/03/18  0438 02/02/18  0415 02/01/18  0517   PHOSPHORUS mg/dL 3 3 2 8 3 6              No results found for: TROPONINI    Imaging: *** {Results Review Statement:85931}    EKG: ***    Micro:  Lab Results   Component Value Date    WOUNDCULT 2+ Growth of Enterococcus faecalis (A) 01/25/2018       Allergies:    Allergies   Allergen Reactions    Benadryl [Diphenhydramine] Swelling    Phenergan [Promethazine] Hives       Medications:   Scheduled Meds:  Current Facility-Administered Medications:  acetaminophen 325 mg Rectal Q4H PRN Amina [place], MD    Adult TPN (CUSTOM BASE/CUSTOM ELECTROLYTE)  Intravenous Continuous Louise Haro PA-C Last Rate: 69 6 mL/hr at 02/02/18 2112 ampicillin-sulbactam 3 g Intravenous Q6H Cristhian Ceballos MD Last Rate: Stopped (02/03/18 0420)   fluconazole 400 mg Intravenous Q24H ABNER Spicer Last Rate: Stopped (02/02/18 1830)   glycopyrrolate 0 2 mg Intravenous Q4H PRN Lizbeth Nino MD    haloperidol lactate 2 mg Intramuscular Q30 Min PRN Lizbeth Nino MD    heparin (porcine) 5,000 Units Subcutaneous [facility] ABNER Spicer    HYDROmorphone 0 5 mg Intravenous Q3H PRN Slime Ruiz PA-C    HYDROmorphone 1 mg Intravenous Q3H PRN Drew Osorio MD    insulin lispro 1-5 Units Subcutaneous Q6H [address] Giuseppe Johnna Mcconnell PA-C    iohexol 50 mL Oral 90 min pre-procedure Imtiaz Barker MD    ketamine 0 2 mg/kg/hr Intravenous Continuous Lizbeth Nino MD Last Rate: 0 2 mg/kg/hr (02/03/18 0103)   lidocaine 2 patch Transdermal Daily Gwen Sauceda PA-C    LORazepam 1 mg Intramuscular Q1H PRN Lizbeth Nino MD    mirtazapine 15 mg Oral HS Nayla JUAN Mcconnell PA-C    pantoprazole 40 mg Intravenous Q24H [address] ABNER Tse      Continuous Infusions:  Adult TPN (CUSTOM BASE/CUSTOM ELECTROLYTE)  Last Rate: 69 6 mL/hr at 02/02/18 2112   ketamine 0 2 mg/kg/hr Last Rate: 0 2 mg/kg/hr (02/03/18 0103)     PRN Meds:    acetaminophen 325 mg Q4H PRN   glycopyrrolate 0 2 mg Q4H PRN   haloperidol lactate 2 mg Q30 Min PRN   HYDROmorphone 0 5 mg Q3H PRN   HYDROmorphone 1 mg Q3H PRN   LORazepam 1 mg Q1H PRN       VTE Pharmacologic Prophylaxis: {Pharmacologic VTE Prophylaxis:062166894}  VTE Mechanical Prophylaxis: {Mechanical VTE Prophylaxis:43586}    Invasive lines and devices:   Invasive Devices     Peripherally Inserted Central Catheter Line            PICC Line 96/05/45 Right Basilic 13 days          Drain            Closed/Suction Drain Left LLQ Bulb 19 Fr  8 days    Closed/Suction Drain Left LUQ Bulb 19 Fr  8 days    Closed/Suction Drain Right RLQ Bulb 19 Fr  8 days    Closed/Suction Drain Right RUQ Accordion 19 Fr  8 days Ventriculostomy/Subdural Ventricular drainage catheter Right 8 days    Chest Tube 1 Right Pleural 10 Fr  2 days    Chest Tube 2 Left Pleural 10 Fr  2 days    Closed/Suction Drain Left Abdomen Bulb 12 Fr  2 days    NG/OG/Enteral Tube Enteral Feeding Tube Right nares 2 days                   Counseling / Coordination of Care  {SL IP Counseling/Coordination of Care Statement:68388}     Code Status: Level 1 - Full Code     Portions of the record may have been created with voice recognition software  Occasional wrong word or "sound a like" substitutions may have occurred due to the inherent limitations of voice recognition software  Read the chart carefully and recognize, using context, where substitutions have occurred       [address] [facility]  [facility],[place] [address], DO

## 2018-02-03 NOTE — PROGRESS NOTES
Progress Note - Thoracic Surgery   Isidro García 36 y o  female MRN: [id number]  Unit/Bed#: ICU 10 Encounter: [id number]    Assessment and Plan:  Patient Active Problem List   Diagnosis    Gastric leak    Acute peritonitis    Idiopathic intracranial hypertension    S/P  shunt     (ventriculoperitoneal) shunt status    Murmur, cardiac    Refusal of blood product    Gastroesophageal reflux disease    Choroidal nevus, right eye       Assessment:  42F with gastric perforation s/p lap HHR at OSH, s/p esophageal stent placement, ex-lap and washout  She has evidence of persistent leak on imaging yesterday which appears to be a delayed leak 2/2 reflux  Also with bilateral pleural effusions and large perisplenic fluid collections       - CT R/L output 270 / 270 respectively, serous     Plan:  Continue NPO w/ TPN  Recommend CT scan due to increasing leukocytosis   Recommend TF through [place] tube  CT to water seal  Continue conservative management at this time    Subjective: Tmax was 100 3 but otherwise no acute events over night  CTs are to sxn with no leaks  She is sat-ing at 99% on 1L nasal canula  All ADENIKE drains have had low output - serosanguinous  Perisplenic drain intact w/ 65cc over past 34 hours output  Objective:       Vitals   Vitals:    02/03/18 0600 02/03/18 0800 02/03/18 0900 02/03/18 1000   BP: (!) 171/91 158/82 157/80 153/69   BP Location:  Left arm  Right arm   Pulse: 96 90 86 94   Resp: 16 (!) 26 21 (!) 33   Temp:  97 7 °F (36 5 °C)     TempSrc:  Oral     SpO2: 98% 99% 99% 94%   Weight:       Height:         Temp  Min: 97 1 °F (36 2 °C)  Max: 100 3 °F (37 9 °C)  IBW: 54 7 kg   Body mass index is 26 75 kg/m²      I/O   I/O       02/01 0701 - 02/02 0700 02/02 0701 - 02/03 0700 02/03 0701 - 02/04 0700    I V  (mL/kg) 450 2 (6 4) 350 (5) 56 4 (0 8)    NG/GT 0 0 0    IV Piggyback 650 750     TPN 1678 1 1525 1 278 4    Total Intake(mL/kg) 2778 3 (39 3) 2625 1 (37 1) 334 8 (4 7)    Urine (mL/kg/hr) 550 (0 3) 2405 (1 4) 450 (1 5)    Emesis/NG output 0 (0) 0 (0) 0 (0)    Drains 104 (0 1) 87 (0 1)     Chest Tube 1230 (0 7) 540 (0 3)     Total Output 1884 3032 450    Net +894 3 -406 9 -115 2           Unmeasured Urine Occurrence 2 x 1 x           Intake/Output Summary (Last 24 hours) at 02/03/18 1112  Last data filed at 02/03/18 6637   Gross per 24 hour   Intake          2959 89 ml   Output             3082 ml   Net          -122 11 ml       Invasive  Devices  Invasive Devices     Peripherally Inserted Central Catheter Line            PICC Line 30/61/46 Right Basilic 13 days          Drain            Closed/Suction Drain Left LLQ Bulb 19 Fr  8 days    Closed/Suction Drain Left LUQ Bulb 19 Fr  8 days    Closed/Suction Drain Right RLQ Bulb 19 Fr  8 days    Closed/Suction Drain Right RUQ Accordion 19 Fr  8 days    Ventriculostomy/Subdural Ventricular drainage catheter Right 8 days    Chest Tube 1 Right Pleural 10 Fr  3 days    Chest Tube 2 Left Pleural 10 Fr  3 days    Closed/Suction Drain Left Abdomen Bulb 12 Fr  3 days    NG/OG/Enteral Tube Enteral Feeding Tube Right nares 3 days                Active medications  Scheduled Meds:  Current Facility-Administered Medications:  acetaminophen 325 mg Rectal Q4H PRN Adonay Talavera MD    Adult TPN (CUSTOM BASE/CUSTOM ELECTROLYTE)  Intravenous Continuous Brandy Vazquez PA-C Last Rate: 69 6 mL/hr at 02/02/18 2112   Adult TPN (CUSTOM BASE/CUSTOM ELECTROLYTE)  Intravenous Continuous Vineet Decker DO    ampicillin-sulbactam 3 g Intravenous Q6H Ariel Cruz MD Last Rate: 3 g (02/03/18 0952)   fluconazole 400 mg Intravenous Q24H ABNER Fountain Last Rate: Stopped (02/02/18 1830)   glycopyrrolate 0 2 mg Intravenous Q4H PRN Marvin Howard MD    haloperidol lactate 2 mg Intramuscular Q30 Min PRN Marvin Howard MD    heparin (porcine) 5,000 Units Subcutaneous [facility] ABNER Fountain    HYDROmorphone 0 5 mg Intravenous Q3H PRN Nicole Boyce PA-C HYDROmorphone 1 mg Intravenous Q3H PRN Alvarado Joshi MD    insulin lispro 1-5 Units Subcutaneous Q6H [address] Lilia Cheng AURELIO Mcconnell    iohexol 50 mL Oral 90 min pre-procedure Clair Cuellar MD    ketamine 0 2 mg/kg/hr Intravenous Continuous Deepti Huggins MD Last Rate: 0 2 mg/kg/hr (02/03/18 0103)   lidocaine 2 patch Transdermal Daily Lu Garber PA-C    LORazepam 1 mg Intramuscular Q1H PRN Deepti Huggins MD    magnesium sulfate 1 g Intravenous Once Rupesh Pat DO    mirtazapine 15 mg Oral HS Nayla R AURELIO Mcconnell    pantoprazole 40 mg Intravenous Q24H [address] ABNER Tse      Continuous Infusions:    Adult TPN (CUSTOM BASE/CUSTOM ELECTROLYTE)  Last Rate: 69 6 mL/hr at 02/02/18 2112   Adult TPN (CUSTOM BASE/CUSTOM ELECTROLYTE)     ketamine 0 2 mg/kg/hr Last Rate: 0 2 mg/kg/hr (02/03/18 0103)     PRN Meds:     acetaminophen 325 mg Q4H PRN   glycopyrrolate 0 2 mg Q4H PRN   haloperidol lactate 2 mg Q30 Min PRN   HYDROmorphone 0 5 mg Q3H PRN   HYDROmorphone 1 mg Q3H PRN   LORazepam 1 mg Q1H PRN       Physical Exam: /69 (BP Location: Right arm)   Pulse 94   Temp 97 7 °F (36 5 °C) (Oral)   Resp (!) 33   Ht 5' 4" (1 626 m)   Wt 70 7 kg (155 lb 13 8 oz)   LMP  (LMP Unknown)   SpO2 94%   Breastfeeding?  No   BMI 26 75 kg/m²     Physical Exam:  General Appearance: Alert, cooperative, no distress, appears stated age  Lungs: Clear to auscultation bilaterally, respirations unlablored   Heart: Regular rate and rhythm, S1 and S2 normal, no murmur, rub or gallop  Abdomen: Soft, non-tender, non distended, bowel sounds active in all four quadrants,   No masses or organomegaly  CT tubes to sxn - no leaks - both with serous drainage   ADENKIE 1- 4 all w/ scant serosanguinous output   Perisplenic drain with dark serosanguinous output     Laboratory and Diagnostics:    Results from last 7 days  Lab Units 02/03/18  0438 02/02/18  0415 02/01/18  0517   WBC Thousand/uL 25 55* 23 32* 17 85*   HEMOGLOBIN g/dL 9 0* 9 0* 7 9*   HEMATOCRIT % 27 9* 28 1* 24 2*   PLATELETS Thousands/uL 648* 730* 650*   MONO PCT MAN % 3* 2* 3*       Results from last 7 days  Lab Units 02/03/18 0438 02/02/18  0415 02/01/18  0517   SODIUM mmol/L 139 139 137   POTASSIUM mmol/L 4 1 3 6 4 6   CHLORIDE mmol/L 100 101 100   CO2 mmol/L 35* 35* 33*   BUN mg/dL 19 18 14   CREATININE mg/dL 0 27* 0 27* 0 38*   CALCIUM mg/dL 8 6 9 0 8 8   TOTAL PROTEIN g/dL  --  4 9*  --    BILIRUBIN TOTAL mg/dL  --  0 38  --    ALK PHOS U/L  --  104  --    ALT U/L  --  15  --    AST U/L  --  15  --    GLUCOSE RANDOM mg/dL 139 159* 401*       Results from last 7 days  Lab Units 02/03/18 0438 02/02/18 0415 02/01/18  0517   MAGNESIUM mg/dL 1 9 1 9 1 9     Lab Results   Component Value Date    PHOS 3 3 02/03/2018    PHOS 2 8 02/02/2018    PHOS 3 6 02/01/2018          No results found for: TROPONINT  ABG:  Lab Results   Component Value Date    PHART 7 484 (H) 01/25/2018    UDL9ESO 33 6 (L) 01/25/2018    PO2ART 165 1 (H) 01/25/2018    SIS8LTJ 24 7 01/25/2018    BEART 1 6 01/25/2018    SOURCE Line, Arterial 01/25/2018       Blood Culture: No results found for: BLOODCX  Urine Culture: No results found for: URINECX  Sputum Culture: No components found for: SPUTUMCX    Imaging: I have personally reviewed pertinent reports     and I have personally reviewed pertinent films in PACS    VTE Pharmacologic Prophylaxis: Heparin  VTE Mechanical Prophylaxis: sequential compression device

## 2018-02-03 NOTE — PROGRESS NOTES
Progress Note - Infectious Disease   De Lancey Arrow 36 y o  female MRN: [id number]  Unit/Bed#: ICU 10 Encounter: [id number]      Impression/Recommendations:  1   Intra-abdominal/pelvic abscesses   Patient is status post repeat abdominal washout   She has multiple drains in place   She is status post placement of drainage in a new perisplenic collection   She is clinically well and systemically stable  Operative culture from [facility], growing only ampicillin susceptible Enterococcus   Operative culture here also growing  only ampicillin susceptible Enterococcus  Latest culture also growing yeast, in addition to Enterococcus  Continue with IV Unasyn/fluconazole  Follow-up on culture of new perisplenic collection      2   Possible infected  shunt   Given presence of tip of  shunt in peritoneal space, there is high probability of  shunt infection   Fortunately, patient has no symptoms concerning for meningitis   She has neurological deficits   She is status post tapping of  shunt at [facility]   CSF culture there had no growth but patient had prior antibiotic  Sherlon Seek will go ahead and treat her for possible/presumptive  shunt infection   Patient is now status post  shunt externalization   CSF culture here also negative  Plan for total resection of  shunt next week noted  With removal shunt, antibiotic course can be decreased to 2-3 weeks     Antibiotic plan as in above  Likely treat with IV antibiotic x 2-3 weeks      3   Gastric perforation, status post repair, with persistent leak   She is now status post placement of esophageal stent   Repeat video barium swallow from last week with no further leakage   Unfortunately, repeat abdomen/pelvis CT showed recurrent leak with new perisplenic collection   She is now status post placement of duodenal feeding tube and drainage of the perisplenic collection   However, both Critical Care service and GI service feel that patient should stay on TPN and not use feeding tube for enteral feeding  Monitor for recurrent leak  Patient will most likely need a repeat barium swallow down the line      4   Recurrent leukocytosis  WBC had decreased with recent drainage of abscess  However, WBC is now increased again with low-grade fever  Will monitor closely  If WBC continues to increase, will need to repeat abdomen/pelvis CT  Antibiotic plan as in above  Monitor WBC and temperature      5  Septic shock, on presentation at [facility]   This is secondary to gastric perforation   Patient is now hemodynamically stable   All cultures from [facility] obtained and reviewed, now on chart   All blood cultures were negative   Urine culture negative   CSF culture negative   Operative culture positive for Enterococcus, as in above  Antibiotic plan as in above  Monitor hemodynamics      Discussed with the patient in detail regarding above plan      Antibiotics:  Unasyn  POD # 9  Fluconazole # 5     Subjective:  Patient's abdominal pain is stable and controlled     No headache  No chest wall pain  She remains in ICU  Temperature low-grade   No chills  She is tolerating antibiotic well   No nausea, vomiting or diarrhea  Objective:  Vitals:  HR:  [] 92  Resp:  [13-37] 15  BP: (138-179)/() 138/74  SpO2:  [91 %-100 %] 97 %  Temp (24hrs), Av 9 °F (37 2 °C), Min:97 7 °F (36 5 °C), Max:100 3 °F (37 9 °C)  Current: Temperature: 97 7 °F (36 5 °C) trace    Physical Exam:     General: Awake, alert, cooperative, no distress  Chest:  BP is site clean  No drainage  No erythema/warmth  No fluctuance  No tenderness  Lungs: Expansion symmetric, no rales, no wheezing, respirations unlabored  Heart[de-identified]  Tachycardic with regular rhythm, S1 and S2 normal, stable murmur  Abdomen: Soft, mildly distended, mild to moderate incisional tenderness  Incision clean  Drain purulent still  Extremities: Trace edema  No erythema/warmth  No ulcer   Nontender to palpation  Skin:  No rash  Invasive Devices     Peripherally Inserted Central Catheter Line            PICC Line 37/84/71 Right Basilic 13 days          Drain            Closed/Suction Drain Left LLQ Bulb 19 Fr  8 days    Closed/Suction Drain Left LUQ Bulb 19 Fr  8 days    Closed/Suction Drain Right RLQ Bulb 19 Fr  8 days    Closed/Suction Drain Right RUQ Accordion 19 Fr  8 days    Ventriculostomy/Subdural Ventricular drainage catheter Right 8 days    Chest Tube 1 Right Pleural 10 Fr  3 days    Chest Tube 2 Left Pleural 10 Fr  3 days    Closed/Suction Drain Left Abdomen Bulb 12 Fr  3 days    NG/OG/Enteral Tube Enteral Feeding Tube Right nares 3 days                Labs studies:   I have personally reviewed pertinent labs  Results from last 7 days  Lab Units 02/03/18  0438 02/02/18  0415 02/01/18  0517   SODIUM mmol/L 139 139 137   POTASSIUM mmol/L 4 1 3 6 4 6   CHLORIDE mmol/L 100 101 100   CO2 mmol/L 35* 35* 33*   ANION GAP mmol/L 4 3* 4   BUN mg/dL 19 18 14   CREATININE mg/dL 0 27* 0 27* 0 38*   EGFR ml/min/1 73sq m 146 146 130   GLUCOSE RANDOM mg/dL 139 159* 401*   CALCIUM mg/dL 8 6 9 0 8 8   AST U/L  --  15  --    ALT U/L  --  15  --    ALK PHOS U/L  --  104  --    TOTAL PROTEIN g/dL  --  4 9*  --    BILIRUBIN TOTAL mg/dL  --  0 38  --        Results from last 7 days  Lab Units 02/03/18  0438 02/02/18  0415 02/01/18  0517   WBC Thousand/uL 25 55* 23 32* 17 85*   HEMOGLOBIN g/dL 9 0* 9 0* 7 9*   PLATELETS Thousands/uL 648* 730* 650*       Results from last 7 days  Lab Units 01/31/18  0902 01/31/18  0845 01/31/18  0842 01/30/18  1151   GRAM STAIN RESULT  No Polys or Bacteria seen Rare Polys  No bacteria seen 1+ Polys  No bacteria seen No Polys or Bacteria seen   BODY FLUID CULTURE, STERILE  Few Colonies of Enterococcus species*  Few Colonies of Yeast* No growth No growth  --        Imaging Studies:   I have personally reviewed pertinent imaging study reports and images in PACS      EKG, Pathology, and Other Studies:   I have personally reviewed pertinent reports

## 2018-02-03 NOTE — PROGRESS NOTES
Progress Note - Critical Care   Mely Lindsey 36 y o  female MRN: [id number]  Unit/Bed#: ICU 10 Encounter: [id number]    Assessment:  50-year-old female with complicated past medical history with hydrocephalus and  shunt Prerna-en-Y gastric bypass and recent hernia repair who presents status post ex lap x2 and external is a shin  shunt  Found to have asked non Neck leak on CT scan and minimal to further stenting, IR for drainage of abdominal collection bilateral thoracentesis with placement of bilateral chest tubes and Kale fed placement, trich GI NPO with a soft chief stent for leak to heal with repeat CT and esophagram in the next few weeks  Patient Active Problem List   Diagnosis    Gastric leak    Acute peritonitis    Idiopathic intracranial hypertension    S/P  shunt     (ventriculoperitoneal) shunt status    Murmur, cardiac    Refusal of blood product    Gastroesophageal reflux disease    Choroidal nevus, right eye         Plan:     Neuro:   GCS 15, alert oriented x4, and analgesia with ketamine drip at 0 2, considering starting PCA Dilaudid drip as per acute pain, she is requiring p r n  Q 3 hours on schedule   shunt remains externalized secondary pseudotumor cerebri, externalized day 8, remains clamped  Ophthalmology consult no papilledema, right eye Choroidal nevus  Would recommend removal of shunt system if she does not needed  Consider removal scheduled Monday/Tuesday by NSGY  recommend routine follow up 6-12 months  CV:  Systolic murmur, TTE inconclusive, follow-up after esophageal and gastric leak resolved  Lung:  Bilateral pleural effusions, Thoracics following, to wall suction to cultures clear, right chest tube 140 cc of serous sanguinous fluid, left chest tube 140 cc of serous sanguinous fluid  No growth to date on pleural feel good, day 4  GI:  GERD Protonix, bowel regimen of Senokot and p r n   MiraLax, peritonitis  secondary GE junction weak, esophageal stent like side x2 with drain placement, right upper quadrant drain put out 5 cc, right lower quadrant put out 8 cc, left upper quadrant put out 2 cc, left lower quadrant put out 8 cc, perisplenic drain put out 65 cc which was darker in nature, rest output was serosanguineous  FEN:  TPN reordered, recheck electrolytes were replaced as needed, no maintenance fluids     :  1 4 cc/hour per kg of urine, creatinine within normal limits  Continue strict I&O monitoring  ID:  Unasyn day 8 , fluconazole day in number 4, appreciate infectious disease input, cultures growing few colonies of enterococcus 2 colonies of yeast     Heme:  Hemoglobin stable at 9, blood management program, DVT prophylaxis with SubQ heparin     Endo:  Blood glucose running 140-211, continue with algorithm 2  For insulin sliding scale                Msk/Skin:  PT OT, frequent turning and repositioning     Disposition:  Continue ICU level of care as  shunt is externalized    Chief Complaint: "I am managing"    HPI/24hr events:  Nursing reports that the fever spiked to 100 3, rectal Tylenol was given and temperature remitted to normal  Nurse reports that the perisplenic ADENIKE drain has been putting out the most fluid, darker in nature, 65  Patient reports pain which is manageable  Nursing reports that the pain is significant because patient distress overnight  Patient admits to flatus without bowel movements  Patient was maintained on TPN  Physical Exam:    Physical Exam   Constitutional: She is oriented to person, place, and time  She appears well-developed and well-nourished  HENT:   Head: Normocephalic and atraumatic  Eyes: Conjunctivae and EOM are normal  Pupils are equal, round, and reactive to light  Neck: Normal range of motion  Neck supple  No JVD present  Cardiovascular:   Murmur (3/6 holosystolic at the apex) heard    Pulmonary/Chest: Effort normal and breath sounds normal    Mild decreased breath sounds at the bases chest tubes in place, sites are clean dry and intact, no air leak with cough bilateral chest tubes   Abdominal: Soft  Midline ex lap scar clean dry and intact with staples in place, drain sites clean dry and intact, abdomen soft, perisplenic drain dark in nature, minimal drainage in rest of drained   Musculoskeletal: She exhibits edema (1+ pitting edema in all 4 extremities)  She exhibits no tenderness  Neurological: She is alert and oriented to person, place, and time  No cranial nerve deficit  Sensory deficit:  Abnormal coordination:    Skin: Skin is warm and dry  Capillary refill takes less than 2 seconds  Psychiatric: She has a normal mood and affect  Vitals:    18 0200 18 0350 18 0355 18 0427   BP: 170/95 167/91  164/88   Pulse: 94 96 90 88   Resp: (!) 24 (!) 37 (!) 30 13   Temp:    99 1 °F (37 3 °C)   TempSrc:    Oral   SpO2: 94% 91% 95% 98%   Weight:       Height:         Arterial Line BP: 166/82  Arterial Line MAP (mmHg): 112 mmHg    Temperature:   Temp (24hrs), Av °F (37 2 °C), Min:98 4 °F (36 9 °C), Max:100 3 °F (37 9 °C)    Current: Temperature: 99 1 °F (37 3 °C)    Weights:   IBW: 54 7 kg    Body mass index is 26 75 kg/m²    Weight (last 2 days)     Date/Time   Weight    18 0545  70 7 (155 87)              Hemodynamic Monitoring:  BP cuff     Non-Invasive/Invasive Ventilation Settings:  Respiratory    Lab Data (Last 4 hours)    None         O2/Vent Data (Last 4 hours)    None              No results found for: PHART, EJS3VQO, PO2ART, PBS6EGG, P0PEBQSM, BEART, SOURCE  SpO2: SpO2: 98 %    Intake and Outputs:  I/O        07 -  0700  07 -  0700    I V  (mL/kg) 450 2 (6 4) 350 (5)    NG/GT 0 0    IV Piggyback 650 750    TPN 1678 1 1525 1    Total Intake(mL/kg) 2778 3 (39 3) 2625 1 (37 1)    Urine (mL/kg/hr) 550 (0 3) 2405 (1 4)    Emesis/NG output 0 (0) 0 (0)    Drains 104 (0 1) 87 (0 1)    Chest Tube 1230 (0 7) 540 (0 3)    Total Output 1884 3032    Net +894 3 -406 9          Unmeasured Urine Occurrence 2 x 1 x        UOP: 1 4 cc/kg/hour   Nutrition:        Diet Orders            Start     Ordered    01/31/18 1709  Diet NPO  Diet effective now     Comments:  NOTHING NPO   Question Answer Comment   Diet Type NPO    RD to adjust diet per protocol? No        01/31/18 1709        TPN currently running    Labs:     Results from last 7 days  Lab Units 02/03/18  0438 02/02/18  0415 02/01/18  0517   WBC Thousand/uL 25 55* 23 32* 17 85*   HEMOGLOBIN g/dL 9 0* 9 0* 7 9*   HEMATOCRIT % 27 9* 28 1* 24 2*   PLATELETS Thousands/uL 648* 730* 650*   MONO PCT MAN % 3* 2* 3*        Results from last 7 days  Lab Units 02/03/18  0438 02/02/18  0415 02/01/18  0517   SODIUM mmol/L 139 139 137   POTASSIUM mmol/L 4 1 3 6 4 6   CHLORIDE mmol/L 100 101 100   CO2 mmol/L 35* 35* 33*   BUN mg/dL 19 18 14   CREATININE mg/dL 0 27* 0 27* 0 38*   CALCIUM mg/dL 8 6 9 0 8 8   TOTAL PROTEIN g/dL  --  4 9*  --    BILIRUBIN TOTAL mg/dL  --  0 38  --    ALK PHOS U/L  --  104  --    ALT U/L  --  15  --    AST U/L  --  15  --    GLUCOSE RANDOM mg/dL 139 159* 401*       Results from last 7 days  Lab Units 02/03/18  0438 02/02/18  0415 02/01/18  0517   MAGNESIUM mg/dL 1 9 1 9 1 9       Results from last 7 days  Lab Units 02/03/18  0438 02/02/18  0415 02/01/18  0517   PHOSPHORUS mg/dL 3 3 2 8 3 6              No results found for: TROPONINI    Imaging: I have personally reviewed pertinent reports  EKG:  Normal sinus rhythm as per telemetry    Micro:  Lab Results   Component Value Date    WOUNDCULT 2+ Growth of Enterococcus faecalis (A) 01/25/2018       Allergies:    Allergies   Allergen Reactions    Benadryl [Diphenhydramine] Swelling    Phenergan [Promethazine] Hives       Medications:   Scheduled Meds:    Current Facility-Administered Medications:  acetaminophen 325 mg Rectal Q4H PRN Maynor Hermosillo MD    Adult TPN (CUSTOM BASE/CUSTOM ELECTROLYTE)  Intravenous Continuous Margaret Cabrera AURELIO Mcconnell Last Rate: 69 6 mL/hr at 02/02/18 2112   Adult TPN (CUSTOM BASE/CUSTOM ELECTROLYTE)  Intravenous Continuous Vineet Decker DO    ampicillin-sulbactam 3 g Intravenous Q6H Dale Bettencourt MD Last Rate: Stopped (02/03/18 0420)   fluconazole 400 mg Intravenous Q24H Oracio Pain, CRNP Last Rate: Stopped (02/02/18 1830)   glycopyrrolate 0 2 mg Intravenous Q4H PRN Jaydon Harper MD    haloperidol lactate 2 mg Intramuscular Q30 Min PRN Jaydon Harper MD    heparin (porcine) 5,000 Units Subcutaneous [facility] Oracio Pain, CRNP    HYDROmorphone 0 5 mg Intravenous Q3H PRN Staci Delacruz PA-C    HYDROmorphone 1 mg Intravenous Q3H PRN [place] Neely MD    insulin lispro 1-5 Units Subcutaneous Q6H [address] Kathryn Mcconnell PA-C    iohexol 50 mL Oral 90 min pre-procedure Margarito Kaur MD    ketamine 0 2 mg/kg/hr Intravenous Continuous Jaydon Harper MD Last Rate: 0 2 mg/kg/hr (02/03/18 0103)   lidocaine 2 patch Transdermal Daily Juana Rose PA-C    LORazepam 1 mg Intramuscular Q1H PRN Jaydon Harper MD    magnesium sulfate 1 g Intravenous Once Ani Perish, DO    mirtazapine 15 mg Oral HS Nayla JUAN Mcconnell PA-C    pantoprazole 40 mg Intravenous Q24H [address] Aretha R [employer]      Continuous Infusions:    Adult TPN (CUSTOM BASE/CUSTOM ELECTROLYTE)  Last Rate: 69 6 mL/hr at 02/02/18 2112   Adult TPN (CUSTOM BASE/CUSTOM ELECTROLYTE)     ketamine 0 2 mg/kg/hr Last Rate: 0 2 mg/kg/hr (02/03/18 0103)     PRN Meds:    acetaminophen 325 mg Q4H PRN   glycopyrrolate 0 2 mg Q4H PRN   haloperidol lactate 2 mg Q30 Min PRN   HYDROmorphone 0 5 mg Q3H PRN   HYDROmorphone 1 mg Q3H PRN   LORazepam 1 mg Q1H PRN       VTE Pharmacologic Prophylaxis: Heparin  VTE Mechanical Prophylaxis: sequential compression device    Invasive lines and devices:   Invasive Devices     Peripherally Inserted Central Catheter Line            PICC Line 21/02/95 Right Basilic 13 days          Drain            Closed/Suction Drain Left LLQ Bulb 19 Fr  8 days    Closed/Suction Drain Left LUQ Bulb 19 Fr  8 days    Closed/Suction Drain Right RLQ Bulb 19 Fr  8 days    Closed/Suction Drain Right RUQ Accordion 19 Fr  8 days    Ventriculostomy/Subdural Ventricular drainage catheter Right 8 days    Chest Tube 1 Right Pleural 10 Fr  2 days    Chest Tube 2 Left Pleural 10 Fr  2 days    Closed/Suction Drain Left Abdomen Bulb 12 Fr  2 days    NG/OG/Enteral Tube Enteral Feeding Tube Right nares 2 days                   Counseling / Coordination of Care  Total Critical Care time spent 50 minutes excluding procedures, teaching and family updates  Code Status: Level 1 - Full Code     Portions of the record may have been created with voice recognition software  Occasional wrong word or "sound a like" substitutions may have occurred due to the inherent limitations of voice recognition software  Read the chart carefully and recognize, using context, where substitutions have occurred       Kamilah Paiz DO

## 2018-02-03 NOTE — PROGRESS NOTES
Progress Note - Critical Care   Michelle Salas 36 y o  female MRN: [id number]  Unit/Bed#: ICU 10 Encounter: [id number]    Assessment:  76-year-old female with complicated past medical history with hydrocephalus and  shunt Prerna-en-Y gastric bypass and recent hernia repair who presents status post ex lap x2 and external is a shin  shunt  Found to have asked non Neck leak on CT scan and minimal to further stenting, IR for drainage of abdominal collection bilateral thoracentesis with placement of bilateral chest tubes and Kale fed placement, trich GI NPO with a soft chief stent for leak to heal with repeat CT and esophagram in the next few weeks  Patient Active Problem List   Diagnosis    Gastric leak    Acute peritonitis    Idiopathic intracranial hypertension    S/P  shunt     (ventriculoperitoneal) shunt status    Murmur, cardiac    Refusal of blood product    Gastroesophageal reflux disease    Choroidal nevus, right eye         Plan:     Neuro:   GCS 15, alert oriented x4, and analgesia with ketamine drip at 0 2, considering starting PCA Dilaudid drip as per acute pain, she is requiring p r n  Q 3 hours on schedule   shunt remains externalized secondary pseudotumor cerebri, externalized day 8, remains clamped  Ophthalmology consult no papilledema, right eye Choroidal nevus  Would recommend removal of shunt system if she does not needed  Consider removal scheduled Monday/Tuesday by NSGY  recommend routine follow up 6-12 months  CV:  Systolic murmur, TTE inconclusive, follow-up after esophageal and gastric leak resolved  Lung:  Bilateral pleural effusions, Thoracics following, to wall suction to cultures clear, right chest tube 140 cc of serous sanguinous fluid, left chest tube 140 cc of serous sanguinous fluid  No growth to date on pleural feel good, day 4  GI:  GERD Protonix, bowel regimen of Senokot and p r n   MiraLax, peritonitis  secondary GE junction weak, esophageal stent like side x2 with drain placement, right upper quadrant drain put out 5 cc, right lower quadrant put out 8 cc, left upper quadrant put out 2 cc, left lower quadrant put out 8 cc, perisplenic drain put out 65 cc which was darker in nature, rest output was serosanguineous  FEN:  TPN reordered, recheck electrolytes were replaced as needed, no maintenance fluids     :  1 4 cc/hour per kg of urine, creatinine within normal limits  Continue strict I&O monitoring  ID:  Unasyn day 8 , fluconazole day in number 4, appreciate infectious disease input, cultures growing few colonies of enterococcus 2 colonies of yeast     Heme:  Hemoglobin stable at 9, blood management program, DVT prophylaxis with SubQ heparin     Endo:  Blood glucose running 140-211, continue with algorithm 2  For insulin sliding scale                Msk/Skin:  PT OT, frequent turning and repositioning     Disposition:  Continue ICU level of care as  shunt is externalized    Chief Complaint: "I am managing"    HPI/24hr events:  Nursing reports that the fever spiked to 100 3, rectal Tylenol was given and temperature remitted to normal  Nurse reports that the perisplenic ADENIKE drain has been putting out the most fluid, darker in nature, 65  Patient reports pain which is manageable  Nursing reports that the pain is significant because patient distress overnight  Patient admits to flatus without bowel movements  Patient was maintained on TPN  Physical Exam:    Physical Exam   Constitutional: She is oriented to person, place, and time  She appears well-developed and well-nourished  HENT:   Head: Normocephalic and atraumatic  Eyes: Conjunctivae and EOM are normal  Pupils are equal, round, and reactive to light  Neck: Normal range of motion  Neck supple  No JVD present  Cardiovascular:   Murmur (3/6 holosystolic at the apex) heard    Pulmonary/Chest: Effort normal and breath sounds normal    Mild decreased breath sounds at the bases chest tubes in place, sites are clean dry and intact, no air leak with cough bilateral chest tubes   Abdominal: Soft  Midline ex lap scar clean dry and intact with staples in place, drain sites clean dry and intact, abdomen soft, perisplenic drain dark in nature, minimal drainage in rest of drained   Musculoskeletal: She exhibits edema (1+ pitting edema in all 4 extremities)  She exhibits no tenderness  Neurological: She is alert and oriented to person, place, and time  No cranial nerve deficit  Sensory deficit:  Abnormal coordination:    Skin: Skin is warm and dry  Capillary refill takes less than 2 seconds  Psychiatric: She has a normal mood and affect  Vitals:    18 0200 18 0350 18 0355 18 0427   BP: 170/95 167/91  164/88   Pulse: 94 96 90 88   Resp: (!) 24 (!) 37 (!) 30 13   Temp:    99 1 °F (37 3 °C)   TempSrc:    Oral   SpO2: 94% 91% 95% 98%   Weight:       Height:         Arterial Line BP: 166/82  Arterial Line MAP (mmHg): 112 mmHg    Temperature:   Temp (24hrs), Av °F (37 2 °C), Min:98 4 °F (36 9 °C), Max:100 3 °F (37 9 °C)    Current: Temperature: 99 1 °F (37 3 °C)    Weights:   IBW: 54 7 kg    Body mass index is 26 75 kg/m²    Weight (last 2 days)     Date/Time   Weight    18 0545  70 7 (155 87)              Hemodynamic Monitoring:  BP cuff     Non-Invasive/Invasive Ventilation Settings:  Respiratory    Lab Data (Last 4 hours)    None         O2/Vent Data (Last 4 hours)    None              No results found for: PHART, APS4MXF, PO2ART, YFF2AMP, A2XHITFB, BEART, SOURCE  SpO2: SpO2: 98 %    Intake and Outputs:  I/O        07 -  0700  07 -  0700    I V  (mL/kg) 450 2 (6 4) 350 (5)    NG/GT 0 0    IV Piggyback 650 750    TPN 1678 1 1525 1    Total Intake(mL/kg) 2778 3 (39 3) 2625 1 (37 1)    Urine (mL/kg/hr) 550 (0 3) 2405 (1 4)    Emesis/NG output 0 (0) 0 (0)    Drains 104 (0 1) 87 (0 1)    Chest Tube 1230 (0 7) 540 (0 3)    Total Output 1884 3032    Net +894 3 -406 9          Unmeasured Urine Occurrence 2 x 1 x        UOP: 1 4 cc/kg/hour   Nutrition:        Diet Orders            Start     Ordered    01/31/18 1709  Diet NPO  Diet effective now     Comments:  NOTHING NPO   Question Answer Comment   Diet Type NPO    RD to adjust diet per protocol? No        01/31/18 1709        TF currently running at ***/hour with a goal of ***  Formula:***    Labs:     Results from last 7 days  Lab Units 02/03/18  0438 02/02/18  0415 02/01/18  0517   WBC Thousand/uL 25 55* 23 32* 17 85*   HEMOGLOBIN g/dL 9 0* 9 0* 7 9*   HEMATOCRIT % 27 9* 28 1* 24 2*   PLATELETS Thousands/uL 648* 730* 650*   MONO PCT MAN % 3* 2* 3*        Results from last 7 days  Lab Units 02/03/18  0438 02/02/18  0415 02/01/18  0517   SODIUM mmol/L 139 139 137   POTASSIUM mmol/L 4 1 3 6 4 6   CHLORIDE mmol/L 100 101 100   CO2 mmol/L 35* 35* 33*   BUN mg/dL 19 18 14   CREATININE mg/dL 0 27* 0 27* 0 38*   CALCIUM mg/dL 8 6 9 0 8 8   TOTAL PROTEIN g/dL  --  4 9*  --    BILIRUBIN TOTAL mg/dL  --  0 38  --    ALK PHOS U/L  --  104  --    ALT U/L  --  15  --    AST U/L  --  15  --    GLUCOSE RANDOM mg/dL 139 159* 401*       Results from last 7 days  Lab Units 02/03/18  0438 02/02/18  0415 02/01/18  0517   MAGNESIUM mg/dL 1 9 1 9 1 9       Results from last 7 days  Lab Units 02/03/18  0438 02/02/18  0415 02/01/18  0517   PHOSPHORUS mg/dL 3 3 2 8 3 6              No results found for: TROPONINI    Imaging: *** {Results Review Statement:44970}    EKG: ***    Micro:  Lab Results   Component Value Date    WOUNDCULT 2+ Growth of Enterococcus faecalis (A) 01/25/2018       Allergies:    Allergies   Allergen Reactions    Benadryl [Diphenhydramine] Swelling    Phenergan [Promethazine] Hives       Medications:   Scheduled Meds:    Current Facility-Administered Medications:  acetaminophen 325 mg Rectal Q4H PRN Mayelin Gaviria MD    Adult TPN (CUSTOM BASE/CUSTOM ELECTROLYTE)  Intravenous Continuous Giuseppe Johnna AURELIO Mcconnell Last Rate: 69 6 mL/hr at 02/02/18 2112   Adult TPN (CUSTOM BASE/CUSTOM ELECTROLYTE)  Intravenous Continuous Vineet Decker DO    ampicillin-sulbactam 3 g Intravenous Q6H Dorian Santos MD Last Rate: Stopped (02/03/18 0420)   fluconazole 400 mg Intravenous Q24H ABNER Hugo Last Rate: Stopped (02/02/18 1830)   glycopyrrolate 0 2 mg Intravenous Q4H PRN John Ryan MD    haloperidol lactate 2 mg Intramuscular Q30 Min PRN John Ryan MD    heparin (porcine) 5,000 Units Subcutaneous [facility] ABNER Hugo    HYDROmorphone 0 5 mg Intravenous Q3H PRN Alma Pantoja PA-C    HYDROmorphone 1 mg Intravenous Q3H PRN Adelaide Steven MD    insulin lispro 1-5 Units Subcutaneous Q6H [facility] Craig Mcconnell PA-C    iohexol 50 mL Oral 90 min pre-procedure Missy Richter MD    ketamine 0 2 mg/kg/hr Intravenous Continuous John Ryan MD Last Rate: 0 2 mg/kg/hr (02/03/18 0103)   lidocaine 2 patch Transdermal Daily Aysha Pavon PA-C    LORazepam 1 mg Intramuscular Q1H PRN John Ryan MD    magnesium sulfate 1 g Intravenous Once Marlene Harris DO    mirtazapine 15 mg Oral HS Nayla JUAN Mcconnell PA-C    pantoprazole 40 mg Intravenous Q24H [facility] ABNER Tse      Continuous Infusions:    Adult TPN (CUSTOM BASE/CUSTOM ELECTROLYTE)  Last Rate: 69 6 mL/hr at 02/02/18 2112   Adult TPN (CUSTOM BASE/CUSTOM ELECTROLYTE)     ketamine 0 2 mg/kg/hr Last Rate: 0 2 mg/kg/hr (02/03/18 0103)     PRN Meds:    acetaminophen 325 mg Q4H PRN   glycopyrrolate 0 2 mg Q4H PRN   haloperidol lactate 2 mg Q30 Min PRN   HYDROmorphone 0 5 mg Q3H PRN   HYDROmorphone 1 mg Q3H PRN   LORazepam 1 mg Q1H PRN       VTE Pharmacologic Prophylaxis: {Pharmacologic VTE Prophylaxis:024085407}  VTE Mechanical Prophylaxis: {Mechanical VTE Prophylaxis:82564}    Invasive lines and devices:   Invasive Devices     Peripherally Inserted Central Catheter Line            PICC Line 66/21/03 Right Basilic 13 days Drain            Closed/Suction Drain Left LLQ Bulb 19 Fr  8 days    Closed/Suction Drain Left LUQ Bulb 19 Fr  8 days    Closed/Suction Drain Right RLQ Bulb 19 Fr  8 days    Closed/Suction Drain Right RUQ Accordion 19 Fr  8 days    Ventriculostomy/Subdural Ventricular drainage catheter Right 8 days    Chest Tube 1 Right Pleural 10 Fr  2 days    Chest Tube 2 Left Pleural 10 Fr  2 days    Closed/Suction Drain Left Abdomen Bulb 12 Fr  2 days    NG/OG/Enteral Tube Enteral Feeding Tube Right nares 2 days                   Counseling / Coordination of Care  {SL IP Counseling/Coordination of Care Statement:63743}     Code Status: Level 1 - Full Code     Portions of the record may have been created with voice recognition software  Occasional wrong word or "sound a like" substitutions may have occurred due to the inherent limitations of voice recognition software  Read the chart carefully and recognize, using context, where substitutions have occurred       Glenn Gonzalez DO

## 2018-02-04 ENCOUNTER — APPOINTMENT (INPATIENT)
Dept: RADIOLOGY | Facility: HOSPITAL | Age: 43
DRG: 711 | End: 2018-02-04
Payer: COMMERCIAL

## 2018-02-04 LAB
ABO GROUP BLD: NORMAL
ANION GAP SERPL CALCULATED.3IONS-SCNC: 4 MMOL/L (ref 4–13)
ANISOCYTOSIS BLD QL SMEAR: PRESENT
APTT PPP: 37 SECONDS (ref 23–35)
BASOPHILS # BLD MANUAL: 0 THOUSAND/UL (ref 0–0.1)
BASOPHILS NFR MAR MANUAL: 0 % (ref 0–1)
BLD GP AB SCN SERPL QL: NEGATIVE
BUN SERPL-MCNC: 18 MG/DL (ref 5–25)
CA-I BLD-SCNC: 1.22 MMOL/L (ref 1.12–1.32)
CALCIUM SERPL-MCNC: 8.7 MG/DL (ref 8.3–10.1)
CHLORIDE SERPL-SCNC: 97 MMOL/L (ref 100–108)
CO2 SERPL-SCNC: 35 MMOL/L (ref 21–32)
CREAT SERPL-MCNC: 0.3 MG/DL (ref 0.6–1.3)
EOSINOPHIL # BLD MANUAL: 0 THOUSAND/UL (ref 0–0.4)
EOSINOPHIL NFR BLD MANUAL: 0 % (ref 0–6)
ERYTHROCYTE [DISTWIDTH] IN BLOOD BY AUTOMATED COUNT: 52.9 % (ref 11.6–15.1)
GFR SERPL CREATININE-BSD FRML MDRD: 141 ML/MIN/1.73SQ M
GLUCOSE SERPL-MCNC: 143 MG/DL (ref 65–140)
GLUCOSE SERPL-MCNC: 144 MG/DL (ref 65–140)
GLUCOSE SERPL-MCNC: 154 MG/DL (ref 65–140)
GLUCOSE SERPL-MCNC: 165 MG/DL (ref 65–140)
GLUCOSE SERPL-MCNC: 168 MG/DL (ref 65–140)
HCT VFR BLD AUTO: 29.7 % (ref 34.8–46.1)
HGB BLD-MCNC: 9.5 G/DL (ref 11.5–15.4)
INR PPP: 1.26 (ref 0.86–1.16)
LYMPHOCYTES # BLD AUTO: 0.63 THOUSAND/UL (ref 0.6–4.47)
LYMPHOCYTES # BLD AUTO: 2 % (ref 14–44)
MAGNESIUM SERPL-MCNC: 2 MG/DL (ref 1.6–2.6)
MCH RBC QN AUTO: 30 PG (ref 26.8–34.3)
MCHC RBC AUTO-ENTMCNC: 32 G/DL (ref 31.4–37.4)
MCV RBC AUTO: 94 FL (ref 82–98)
MONOCYTES # BLD AUTO: 1.26 THOUSAND/UL (ref 0–1.22)
MONOCYTES NFR BLD: 4 % (ref 4–12)
NEUTROPHILS # BLD MANUAL: 29.58 THOUSAND/UL (ref 1.85–7.62)
NEUTS BAND NFR BLD MANUAL: 1 % (ref 0–8)
NEUTS SEG NFR BLD AUTO: 93 % (ref 43–75)
PHOSPHATE SERPL-MCNC: 3.6 MG/DL (ref 2.7–4.5)
PLATELET # BLD AUTO: 731 THOUSANDS/UL (ref 149–390)
PLATELET BLD QL SMEAR: ABNORMAL
PMV BLD AUTO: 9.5 FL (ref 8.9–12.7)
POLYCHROMASIA BLD QL SMEAR: PRESENT
POTASSIUM SERPL-SCNC: 3.9 MMOL/L (ref 3.5–5.3)
PROTHROMBIN TIME: 15.9 SECONDS (ref 12.1–14.4)
RBC # BLD AUTO: 3.17 MILLION/UL (ref 3.81–5.12)
RBC MORPH BLD: PRESENT
RH BLD: NEGATIVE
SODIUM SERPL-SCNC: 136 MMOL/L (ref 136–145)
SPECIMEN EXPIRATION DATE: NORMAL
WBC # BLD AUTO: 31.47 THOUSAND/UL (ref 4.31–10.16)

## 2018-02-04 PROCEDURE — 86850 RBC ANTIBODY SCREEN: CPT | Performed by: NEUROLOGICAL SURGERY

## 2018-02-04 PROCEDURE — 85007 BL SMEAR W/DIFF WBC COUNT: CPT | Performed by: SURGERY

## 2018-02-04 PROCEDURE — 82330 ASSAY OF CALCIUM: CPT | Performed by: SURGERY

## 2018-02-04 PROCEDURE — C9113 INJ PANTOPRAZOLE SODIUM, VIA: HCPCS | Performed by: NURSE PRACTITIONER

## 2018-02-04 PROCEDURE — 85610 PROTHROMBIN TIME: CPT | Performed by: NEUROLOGICAL SURGERY

## 2018-02-04 PROCEDURE — 84100 ASSAY OF PHOSPHORUS: CPT | Performed by: SURGERY

## 2018-02-04 PROCEDURE — 71260 CT THORAX DX C+: CPT

## 2018-02-04 PROCEDURE — 85730 THROMBOPLASTIN TIME PARTIAL: CPT | Performed by: NEUROLOGICAL SURGERY

## 2018-02-04 PROCEDURE — 86901 BLOOD TYPING SEROLOGIC RH(D): CPT | Performed by: NEUROLOGICAL SURGERY

## 2018-02-04 PROCEDURE — 99024 POSTOP FOLLOW-UP VISIT: CPT | Performed by: NEUROLOGICAL SURGERY

## 2018-02-04 PROCEDURE — 86900 BLOOD TYPING SEROLOGIC ABO: CPT | Performed by: NEUROLOGICAL SURGERY

## 2018-02-04 PROCEDURE — 74177 CT ABD & PELVIS W/CONTRAST: CPT

## 2018-02-04 PROCEDURE — 85027 COMPLETE CBC AUTOMATED: CPT | Performed by: SURGERY

## 2018-02-04 PROCEDURE — 83735 ASSAY OF MAGNESIUM: CPT | Performed by: SURGERY

## 2018-02-04 PROCEDURE — 99233 SBSQ HOSP IP/OBS HIGH 50: CPT | Performed by: EMERGENCY MEDICINE

## 2018-02-04 PROCEDURE — 82948 REAGENT STRIP/BLOOD GLUCOSE: CPT

## 2018-02-04 PROCEDURE — 99233 SBSQ HOSP IP/OBS HIGH 50: CPT | Performed by: INTERNAL MEDICINE

## 2018-02-04 PROCEDURE — 85025 COMPLETE CBC W/AUTO DIFF WBC: CPT | Performed by: SURGERY

## 2018-02-04 PROCEDURE — 80048 BASIC METABOLIC PNL TOTAL CA: CPT | Performed by: SURGERY

## 2018-02-04 RX ORDER — BISACODYL 10 MG
10 SUPPOSITORY, RECTAL RECTAL DAILY PRN
Status: DISCONTINUED | OUTPATIENT
Start: 2018-02-04 | End: 2018-02-22

## 2018-02-04 RX ORDER — HEPARIN SODIUM 5000 [USP'U]/ML
5000 INJECTION, SOLUTION INTRAVENOUS; SUBCUTANEOUS EVERY 8 HOURS SCHEDULED
Status: DISCONTINUED | OUTPATIENT
Start: 2018-02-05 | End: 2018-02-05 | Stop reason: HOSPADM

## 2018-02-04 RX ADMIN — BISACODYL 10 MG: 10 SUPPOSITORY RECTAL at 16:56

## 2018-02-04 RX ADMIN — HYDROMORPHONE HYDROCHLORIDE 1 MG: 1 INJECTION, SOLUTION INTRAMUSCULAR; INTRAVENOUS; SUBCUTANEOUS at 09:20

## 2018-02-04 RX ADMIN — THIAMINE HYDROCHLORIDE: 100 INJECTION, SOLUTION INTRAMUSCULAR; INTRAVENOUS at 22:28

## 2018-02-04 RX ADMIN — LORAZEPAM 1 MG: 2 INJECTION INTRAMUSCULAR; INTRAVENOUS at 13:28

## 2018-02-04 RX ADMIN — Medication 3 G: at 22:26

## 2018-02-04 RX ADMIN — Medication 3 G: at 09:29

## 2018-02-04 RX ADMIN — Medication 3 G: at 15:57

## 2018-02-04 RX ADMIN — PANTOPRAZOLE SODIUM 40 MG: 40 INJECTION, POWDER, FOR SOLUTION INTRAVENOUS at 09:19

## 2018-02-04 RX ADMIN — HEPARIN SODIUM 5000 UNITS: 5000 INJECTION, SOLUTION INTRAVENOUS; SUBCUTANEOUS at 14:45

## 2018-02-04 RX ADMIN — HYDROMORPHONE HYDROCHLORIDE 1 MG: 1 INJECTION, SOLUTION INTRAMUSCULAR; INTRAVENOUS; SUBCUTANEOUS at 13:27

## 2018-02-04 RX ADMIN — HYDROMORPHONE HYDROCHLORIDE 1 MG: 1 INJECTION, SOLUTION INTRAMUSCULAR; INTRAVENOUS; SUBCUTANEOUS at 02:41

## 2018-02-04 RX ADMIN — KETAMINE HYDROCHLORIDE 0.2 MG/KG/HR: 50 INJECTION, SOLUTION INTRAMUSCULAR; INTRAVENOUS at 14:46

## 2018-02-04 RX ADMIN — IOHEXOL 100 ML: 350 INJECTION, SOLUTION INTRAVENOUS at 12:31

## 2018-02-04 RX ADMIN — INSULIN LISPRO 1 UNITS: 100 INJECTION, SOLUTION INTRAVENOUS; SUBCUTANEOUS at 18:29

## 2018-02-04 RX ADMIN — HYDROMORPHONE HYDROCHLORIDE 1 MG: 1 INJECTION, SOLUTION INTRAMUSCULAR; INTRAVENOUS; SUBCUTANEOUS at 22:26

## 2018-02-04 RX ADMIN — HEPARIN SODIUM 5000 UNITS: 5000 INJECTION, SOLUTION INTRAVENOUS; SUBCUTANEOUS at 05:30

## 2018-02-04 RX ADMIN — HEPARIN SODIUM 5000 UNITS: 5000 INJECTION, SOLUTION INTRAVENOUS; SUBCUTANEOUS at 22:26

## 2018-02-04 RX ADMIN — INSULIN LISPRO 1 UNITS: 100 INJECTION, SOLUTION INTRAVENOUS; SUBCUTANEOUS at 00:45

## 2018-02-04 RX ADMIN — INSULIN LISPRO 1 UNITS: 100 INJECTION, SOLUTION INTRAVENOUS; SUBCUTANEOUS at 13:27

## 2018-02-04 RX ADMIN — MIRTAZAPINE 15 MG: 15 TABLET, ORALLY DISINTEGRATING ORAL at 22:27

## 2018-02-04 RX ADMIN — LIDOCAINE 2 PATCH: 50 PATCH TOPICAL at 09:20

## 2018-02-04 RX ADMIN — HYDROMORPHONE HYDROCHLORIDE 1 MG: 1 INJECTION, SOLUTION INTRAMUSCULAR; INTRAVENOUS; SUBCUTANEOUS at 16:56

## 2018-02-04 RX ADMIN — FLUCONAZOLE 400 MG: 2 INJECTION INTRAVENOUS at 15:58

## 2018-02-04 RX ADMIN — Medication 3 G: at 04:03

## 2018-02-04 RX ADMIN — HYDROMORPHONE HYDROCHLORIDE 1 MG: 1 INJECTION, SOLUTION INTRAMUSCULAR; INTRAVENOUS; SUBCUTANEOUS at 05:25

## 2018-02-04 NOTE — PROGRESS NOTES
Progress Note - General Surgery  Octavia Srinivasan 36 y o  female MRN: [id number]  Unit/Bed#: ICU 10-01 Encounter: [id number]    Assessment:  36y o -year-old female with gastric perforation s/p hiatal hernia repair at outside hospital, s/p esophageal stent placement, exploratory laparotomy and washout  Has had persistent esophageal/gastric leak on imaging  Had bilateral pleural effusions which were drained  Perisplenic fluid collection was drained  Plan:  1  Gastric perforation   - KUB showed keofed in distal duodenum/proximal jejunum, tube feeds started [email or web address], continue to advance very slowly   - continue drains   - continue TPN   - abx per ID is unasyn / fluconazole from cultures growing enterococcus    - ketamine gtt wean, consider transition to PCA-D    2  Leukocytosis   - persistent despite antibiotics   - await CBC today; if leukocytosis is persistent would obtain CT-chest/abdomen/pelvis     3  Externalized  shunt   - possible removal next week by neurosurgery    4  DVT Prophylaxis   - SQH   - SCDs    5  Disposition   - ICU while  shunt is externalized    Zulma Vazquez MD PGY-4  6:24 AM  02/04/18      Subjective:  Patient reports that she "felt better in the past" but she is overall improving  Tube feeds started at trickle rate without incident after KUB confirmed post-pyloric keofed  Passing flatus       Objective:  Patient Vitals for the past 24 hrs:   BP Temp Temp src Pulse Resp SpO2   02/04/18 0440 148/72 - - 94 17 95 %   02/04/18 0340 145/78 - - 98 15 94 %   02/04/18 0240 159/76 - - (!) 108 (!) 32 95 %   02/04/18 0143 152/81 - - 98 (!) 26 93 %   02/04/18 0043 157/86 - - 98 18 95 %   02/03/18 2343 149/80 100 °F (37 8 °C) Oral 96 17 94 %   02/03/18 2243 151/85 - - 98 20 95 %   02/03/18 2143 152/81 - - 98 (!) 28 94 %   02/03/18 2043 141/81 - - 98 (!) 29 94 %   02/03/18 1943 131/81 98 2 °F (36 8 °C) Oral 100 21 95 %   02/03/18 1909 - - - 94 22 93 %   02/03/18 1843 162/86 - - 96 22 95 %   02/03/18 1743 154/77 - - 98 20 94 %   02/03/18 1700 147/78 - - 90 20 95 %   02/03/18 1620 - - - 94 (!) 28 94 %   02/03/18 1544 155/86 99 3 °F (37 4 °C) Oral 96 (!) 35 95 %   02/03/18 1400 158/77 - - 92 (!) 30 94 %   02/03/18 1300 152/78 - - 90 22 95 %   02/03/18 1200 154/77 98 4 °F (36 9 °C) Oral 96 (!) 26 97 %   02/03/18 1100 138/74 - - 92 15 97 %   02/03/18 1000 153/69 - - 94 (!) 33 94 %   02/03/18 0900 157/80 - - 86 21 99 %   02/03/18 0800 158/82 97 7 °F (36 5 °C) Oral 90 (!) 26 99 %          Diet Orders            Start     Ordered    02/03/18 1704  Diet Enteral/Parenteral; Tube Feeding No Oral Diet; Jevity 1 2 Claude; 10  Diet effective now     Question Answer Comment   Diet Type Enteral/Parenteral    Enteral/Parenteral Tube Feeding No Oral Diet    Tube Feeding Formula: Jevity 1 2 Claude    Tube Feeding Continuous rate (mL/hr): 10    RD to adjust diet per protocol?  No        02/03/18 1703          Intake/Output Summary (Last 24 hours) at 02/04/18 0624  Last data filed at 02/04/18 0403   Gross per 24 hour   Intake          3026 61 ml   Output             3387 ml   Net          -360 39 ml   UOP 2 4  R ADENIKE 10 serous  R ADENIKE 10 serous  L ADENIKE 12 serous  L ADENIKE 10 serous  L ADENIKE spleen 60 serosanguinous  Ventriculostomy 0  CT L 290 serous -AL sxn  CT R 320 serous -AL sxn      [email or web address] [email or web address]    Physical Exam:  General: NAD  HEENT: externalized  shunt  Cardiovascular: RRR  Respiratory: breath sounds b/l  Abdomen: soft, NT, incision c/d/i  Extremities: no edema    Medications:    Current Facility-Administered Medications:  acetaminophen 325 mg Rectal Q4H PRN Indio King MD    Adult TPN (CUSTOM BASE/CUSTOM ELECTROLYTE)  Intravenous Continuous Vineet Decker DO Last Rate: 69 6 mL/hr at 02/04/18 0403   ampicillin-sulbactam 3 g Intravenous Q6H Colin Kramer MD Last Rate: 3 g (02/04/18 0403)   fluconazole 400 mg Intravenous Q24H ABNER Carranza Last Rate: 400 mg (02/03/18 1628)   glycopyrrolate 0 2 mg Intravenous Q4H PRN Linda Bustos MD    haloperidol lactate 2 mg Intramuscular Q30 Min PRN Linda Bustos MD    heparin (porcine) 5,000 Units Subcutaneous [facility] ABNER Cid    HYDROmorphone 0 5 mg Intravenous Q3H PRN Martita Padron PA-C    HYDROmorphone 1 mg Intravenous Q3H PRN Osmar Valentin MD    insulin lispro 1-5 Units Subcutaneous Q6H [address] Salty Mcconnell PA-C    iohexol 50 mL Oral 90 min pre-procedure Rekha Suarez MD    ketamine 0 2 mg/kg/hr Intravenous Continuous Linda Bustos MD Last Rate: 0 2 mg/kg/hr (02/04/18 0403)   lidocaine 2 patch Transdermal Daily Juan Johnson PA-C    LORazepam 1 mg Intramuscular Q1H PRN Linda Bustos MD    mirtazapine 15 mg Oral HS Nayla JUAN Mcconnell PA-C    pantoprazole 40 mg Intravenous Q24H [address] ABNER Rivera        Adult TPN (CUSTOM BASE/CUSTOM ELECTROLYTE)  Last Rate: 69 6 mL/hr at 02/04/18 0403   ketamine 0 2 mg/kg/hr Last Rate: 0 2 mg/kg/hr (02/04/18 0403)       acetaminophen 325 mg Q4H PRN   glycopyrrolate 0 2 mg Q4H PRN   haloperidol lactate 2 mg Q30 Min PRN   HYDROmorphone 0 5 mg Q3H PRN   HYDROmorphone 1 mg Q3H PRN   LORazepam 1 mg Q1H PRN     Laboratory results:   CBC:   No results found for: WBC, HGB, HCT, MCV, PLT, ADJUSTEDWBC, MCH, MCHC, RDW, MPV, NRBC, CMP:   No results found for: NA, K, CL, CO2, ANIONGAP, BUN, CREATININE, GLUCOSE, CALCIUM, AST, ALT, ALKPHOS, PROT, ALBUMIN, BILITOT, EGFR, Coagulation: No results found for: PT, INR, APTT, Urinalysis: No results found for: COLORU, CLARITYU, SPECGRAV, PHUR, LEUKOCYTESUR, NITRITE, PROTEINUA, GLUCOSEU, KETONESU, BILIRUBINUR, BLOODU, Amylase: No results found for: AMYLASE, Lipase: No results found for: LIPASE    VTE Pharmacologic Prophylaxis: Heparin  VTE Mechanical Prophylaxis: sequential compression device

## 2018-02-04 NOTE — PROGRESS NOTES
Progress Note - Critical Care   Ghassan Franklin 36 y o  female MRN: [id number]  Unit/Bed#: ICU 10 Encounter: [id number]    Assessment:  76-year-old female with complicated past medical history with hydrocephalus and  shunt room I bypassed and recent hernia fair who presents status post ex lap x2 and external is a shin of  shunt for  shunt infection  Found to have a GE on CT scan and required esophageal stenting, IR drainage for abdominal collections, bilateral thoracentesis with chest tube placement for bilateral pleural effusions, Keofeed tube placement  Patient Active Problem List   Diagnosis    Gastric leak    Acute peritonitis    Idiopathic intracranial hypertension    S/P  shunt     (ventriculoperitoneal) shunt status    Murmur, cardiac    Refusal of blood product    Gastroesophageal reflux disease    Choroidal nevus, right eye       Plan:     Neuro:  GCS 15 alert oriented x4 analgesia with ketamine 0 2, Dilaudid p r n  Haldol p r n  Remeron for mood  Patient has had relief of pain with this regimen  Acute pain service is following, recommendations appreciated   shunt externalized secondary to pseudotumor cerebri externalized a 9  Remains clamped at this time  Appreciate neurosurgical recommendations, ophthalmology dilated exam   Showed choroidal nevus  Tentative plan to remove shunt system Monday Tuesday as per Neurosurgery  Nevus anion needs to follow up outpatient 6-12 months  CV:  Systolic murmur TTE inconclusive follow-up after esophageal and gastric leak resolved for possible KACIE     Lung:  Bilateral pleural effusions, chest tube placement, Thoracics following, no growth to date out of pleural samples  Chest tubes to come off wall suction wants culture it is definitively negative  Output     GI:  Protonix for GERD, bowel regimen of Senokot, peritonitis secondary to GE junction, resolving  Esophageal stent    Upper right quadrant upper left quadrant lower right quadrant lower left quadrant, perisplenic ADENIKE drains in place by IR  Output    Trickle feeds were started yesterday patient tolerated them well  FEN:  TPN reordered, replace electrolytes as needed  Trickle feeds Jevity 1 2 started     :  Urine output remains acceptable, creatinine within normal limits  Continue trending  Patient net -360 4 for the 24 hour period     ID:  Unasyn day 9 , fluconazole day number 5, appreciate ID recommendations, peritoneal fluid growing a few, he has enterococcus few colonies of yeast   No sensitivities  Heme:  Hemoglobin stable, blood management program   DVT prophylaxis with subcu heparin  Continue to trend leukocytosis     Endo:  Patient's blood glucose is mildly elevated from 140-160  Continue with insulin sliding scale number 2                Msk/Skin:  PT OT frequent turning and repositioning     Disposition:  Continue ICU level care as  is externalized    Chief Complaint:  "I am doing okay"    HPI/24hr events:  Nursing reports patient tolerated trickle feeds without difficulty  Physical Exam:     Physical Exam   Constitutional: She is oriented to person, place, and time  She appears well-developed and well-nourished  HENT:   Head: Normocephalic and atraumatic  Eyes: EOM are normal  Pupils are equal, round, and reactive to light  Neck: Normal range of motion  Neck supple  No JVD present  Cardiovascular: Normal rate and regular rhythm  Exam reveals gallop  Exam reveals no friction rub  Murmur heard  Systolic murmur is present with a grade of 3/6   Pulses:       Radial pulses are 2+ on the right side, and 2+ on the left side  Dorsalis pedis pulses are 2+ on the right side, and 2+ on the left side  Posterior tibial pulses are 2+ on the right side, and 2+ on the left side  Pulmonary/Chest: Effort normal and breath sounds normal  No respiratory distress  She has no wheezes  She has no rales  She exhibits no tenderness         Abdominal: There is tenderness  There is rigidity and guarding  There is no rebound  Drain sites are clean dry and intact, except staples clean dry and intact with well approximating skin   Musculoskeletal:   Moves all extremities, 1+ pitting edema in all distal extremities   Neurological: She is oriented to person, place, and time  GCS eye subscore is 4  GCS verbal subscore is 5  GCS motor subscore is 6  Vitals:    18 0143 18 0240 18 0340 18 0440   BP: 152/81 159/76 145/78 148/72   Pulse: 98 (!) 108 98 94   Resp: (!) 26 (!) 32 15 17   Temp:       TempSrc:       SpO2: 93% 95% 94% 95%   Weight:       Height:         Arterial Line BP: 166/82  Arterial Line MAP (mmHg): 112 mmHg    Temperature:   Temp (24hrs), Av 7 °F (37 1 °C), Min:97 7 °F (36 5 °C), Max:100 °F (37 8 °C)    Current: Temperature: 100 °F (37 8 °C)    Weights:   IBW: 54 7 kg    Body mass index is 26 75 kg/m²  Weight (last 2 days)     None          Hemodynamic Monitoring:  BP cuff     Non-Invasive/Invasive Ventilation Settings:  Respiratory    Lab Data (Last 4 hours)    None         O2/Vent Data (Last 4 hours)    None              No results found for: PHART, PTY6ZJY, PO2ART, ZFN4MVS, F2XDUJFZ, BEART, SOURCE  SpO2: SpO2: 95 %    Intake and Outputs:  I/O        07 -  0700  07 -  0700    I V  (mL/kg) 350 (5) 338 9 (4 8)    NG/GT 0 0    IV Piggyback 750 855    TPN 1525  1 1672 7    Feedings  160    Total Intake(mL/kg) 2625 1 (37 1) 3026 6 (42 8)    Urine (mL/kg/hr) 2405 (1 4) 2675 (1 6)    Emesis/NG output 0 (0) 0 (0)    Drains 87 (0 1) 102 (0 1)    Chest Tube 540 (0 3) 610 (0 4)    Total Output 3032 3387    Net -406 9 -360 4          Unmeasured Urine Occurrence 1 x 1 x        UOP: 111 /hour   Nutrition:        Diet Orders            Start     Ordered    18 1704  Diet Enteral/Parenteral; Tube Feeding No Oral Diet; Jevity 1 2 Claude; 10  Diet effective now     Question Answer Comment   Diet Type Enteral/Parenteral    Enteral/Parenteral Tube Feeding No Oral Diet    Tube Feeding Formula: Jevity 1 2 Claude    Tube Feeding Continuous rate (mL/hr): 10    RD to adjust diet per protocol? No        02/03/18 1703        Jevity 1 2 running at 10 cc an hour    Labs:     Results from last 7 days  Lab Units 02/03/18  0438 02/02/18  0415 02/01/18  0517   WBC Thousand/uL 25 55* 23 32* 17 85*   HEMOGLOBIN g/dL 9 0* 9 0* 7 9*   HEMATOCRIT % 27 9* 28 1* 24 2*   PLATELETS Thousands/uL 648* 730* 650*   MONO PCT MAN % 3* 2* 3*        Results from last 7 days  Lab Units 02/04/18  0535 02/03/18  0438 02/02/18  0415   SODIUM mmol/L 136 139 139   POTASSIUM mmol/L 3 9 4 1 3 6   CHLORIDE mmol/L 97* 100 101   CO2 mmol/L 35* 35* 35*   BUN mg/dL 18 19 18   CREATININE mg/dL 0 30* 0 27* 0 27*   CALCIUM mg/dL 8 7 8 6 9 0   TOTAL PROTEIN g/dL  --   --  4 9*   BILIRUBIN TOTAL mg/dL  --   --  0 38   ALK PHOS U/L  --   --  104   ALT U/L  --   --  15   AST U/L  --   --  15   GLUCOSE RANDOM mg/dL 144* 139 159*       Results from last 7 days  Lab Units 02/04/18  0535 02/03/18  0438 02/02/18  0415   MAGNESIUM mg/dL 2 0 1 9 1 9       Results from last 7 days  Lab Units 02/04/18  0535 02/03/18  0438 02/02/18  0415   PHOSPHORUS mg/dL 3 6 3 3 2 8              No results found for: TROPONINI    Imaging:  Xr Chest Portable    Result Date: 1/27/2018  Impression: 1  Tip of NG feeding tube in location of the duodenal bulb  2   Consolidation in the base of the left lower lobe  3   Small bilateral pleural effusions  Workstation performed: [id number]     Xr Chest Portable    Result Date: 1/26/2018  Impression: ET tube terminates just below the clavicular heads  Workstation performed: [id number]     Xr Chest Portable    Result Date: 1/25/2018  Impression: No active disease  Workstation performed: [id number]     Xr Abdomen 1 View Kub    Result Date: 1/27/2018  Impression: Keofeed tube tip overlies the distal gastric region, appears in position   Workstation performed: [id number]     Xr Abdomen 1 View Kub    Result Date: 1/25/2018  Impression: Fluoroscopic guidance for placement of feeding tube with tip in the 1st portion of the duodenum  Please refer to the separate procedure notes for additional details  Workstation performed: [id number]     Ct Head Wo Contrast    Result Date: 1/25/2018  Impression: No acute intracranial abnormality  Right frontal approach catheter terminating in the region of the third ventricle  Ventricular system is decompressed  No evidence for interstitial edema or extra-axial fluid collections  Workstation performed: [id number]     Fl Barium Swallow    Addendum Date: 1/30/2018    Addendum: In retrospect, on the last overhead lateral image there is contrast material posterior to the mid aspect of the stent  Previously there was was believed to be within the bowel  Based on CT examination of 1/30/2018 this likely represents a site of leak posterior to the gastroesophageal junction  This was not seen dynamically and likely represents a delayed leak  Result Date: 1/30/2018  Impression: 1  Gastroesophageal stent identified without evidence for leak  Slow passage of contrast material through the stent  There is some persistent stasis of contrast material within the stent at the end of the exam  2   Reflux of contrast is noted to the upper esophagus  Workstation performed: [id number]     Ct Chest Abdomen Pelvis W Contrast    Result Date: 1/25/2018  Impression: Extensive perihepatic subcapsular abscess which is in communication with a right paracolic gutter abscess  Drainage catheter within the left subhepatic space does not appear to communicate with this abscess  Large perisplenic abscess with mass effect on the spleen  Peripheral hypodensities within the spleen likely represent splenic infarcts  Infectious involvement less likely but not excluded  Large left paracolic gutter abscess  Mesenteric abscess as described   Pelvic abscess as described, pelvic drainage catheter is anterior to the abscess  Patient is status post placement of esophageal stent as well as gastric sleeve procedure  No evidence of extravasated oral contrast is identified on this exam  Dependent lower lobe atelectasis and small bilateral pleural effusions  Extensive subcutaneous edema suggesting anasarca  Soft tissue gas identified within the left axillary soft tissues, correlate for a potential iatrogenic etiology  If this does not exist, an infectious process related to a gas-forming organism is should be excluded  I personally discussed this study with Kristen Ambriz on 1/25/2018 11:58 AM  Workstation performed: [id number]     CG Shunt Series    Result Date: 1/25/2018  Impression: Intact  shunt catheter  Workstation performed: [id number]      I have personally reviewed pertinent reports  EKG:  Normal sinus rhythm on telemetry    Micro:  Lab Results   Component Value Date    WOUNDCULT 2+ Growth of Enterococcus faecalis (A) 01/25/2018       Allergies:    Allergies   Allergen Reactions    Benadryl [Diphenhydramine] Swelling    Phenergan [Promethazine] Hives       Medications:   Scheduled Meds:    Current Facility-Administered Medications:  acetaminophen 325 mg Rectal Q4H PRN Shelly Brooks MD    Adult TPN (CUSTOM BASE/CUSTOM ELECTROLYTE)  Intravenous Continuous Vineet Decker DO Last Rate: 69 6 mL/hr at 02/04/18 0403   ampicillin-sulbactam 3 g Intravenous Q6H Isatu Quiles MD Last Rate: 3 g (02/04/18 0403)   fluconazole 400 mg Intravenous Q24H ABNER Posada Last Rate: 400 mg (02/03/18 1628)   glycopyrrolate 0 2 mg Intravenous Q4H PRN Brigid Mccall MD    haloperidol lactate 2 mg Intramuscular Q30 Min PRN Brgiid Mccall MD    heparin (porcine) 5,000 Units Subcutaneous [facility] ABNER Posada    HYDROmorphone 0 5 mg Intravenous Q3H PRN Enrico Barr PA-C    HYDROmorphone 1 mg Intravenous Q3H PRN Katherine Reyes MD    insulin lispro 1-5 Units Subcutaneous Q6H [address] Nayla Mcconnell PA-C    iohexol 50 mL Oral 90 min pre-procedure Lody Lee MD    ketamine 0 2 mg/kg/hr Intravenous Continuous Jaki Inman MD Last Rate: 0 2 mg/kg/hr (02/04/18 0403)   lidocaine 2 patch Transdermal Daily Emiliana Miles PA-C    LORazepam 1 mg Intramuscular Q1H PRN Jaki Inman MD    mirtazapine 15 mg Oral HS Nayla Mcconnell PA-C    pantoprazole 40 mg Intravenous Q24H [address] ABNER Tse      Continuous Infusions:    Adult TPN (CUSTOM BASE/CUSTOM ELECTROLYTE)  Last Rate: 69 6 mL/hr at 02/04/18 0403   ketamine 0 2 mg/kg/hr Last Rate: 0 2 mg/kg/hr (02/04/18 0403)     PRN Meds:    acetaminophen 325 mg Q4H PRN   glycopyrrolate 0 2 mg Q4H PRN   haloperidol lactate 2 mg Q30 Min PRN   HYDROmorphone 0 5 mg Q3H PRN   HYDROmorphone 1 mg Q3H PRN   LORazepam 1 mg Q1H PRN       VTE Pharmacologic Prophylaxis: Heparin  VTE Mechanical Prophylaxis: sequential compression device    Invasive lines and devices: Invasive Devices     Peripherally Inserted Central Catheter Line            PICC Line 28/30/43 Right Basilic 14 days          Drain            Closed/Suction Drain Left LLQ Bulb 19 Fr  9 days    Closed/Suction Drain Left LUQ Bulb 19 Fr  9 days    Closed/Suction Drain Right RLQ Bulb 19 Fr  9 days    Closed/Suction Drain Right RUQ Accordion 19 Fr  9 days    Ventriculostomy/Subdural Ventricular drainage catheter Right 9 days    Chest Tube 1 Right Pleural 10 Fr  3 days    Chest Tube 2 Left Pleural 10 Fr  3 days    Closed/Suction Drain Left Back Bulb 12 Fr  3 days    NG/OG/Enteral Tube Enteral Feeding Tube Right nares 3 days                   Counseling / Coordination of Care  Total Critical Care time spent 30 minutes excluding procedures, teaching and family updates  Code Status: Level 1 - Full Code     Portions of the record may have been created with voice recognition software    Occasional wrong word or "sound a like" substitutions may have occurred due to the inherent limitations of voice recognition software  Read the chart carefully and recognize, using context, where substitutions have occurred       Glenn Gonzalez DO

## 2018-02-04 NOTE — PROGRESS NOTES
Progress Note - Neurosurgery   Luis Mercadoman 36 y o  female MRN: [id number]  Unit/Bed#: ICU 10 Encounter: [id number]    Assessment:    3 41-year-old female transfer [facility] for esophageal stent status post upper gastric injury during laparoscopic paraesophageal hernia repair  Patient developed sepsis and required multiple operative procedures  Upon transfer to [facility], she underwent a esophageal stent and externalization right ventriculoperitoneal shunt, POD# 10; shunt clamped  2  History of ventriculoperitoneal shunt for idiopathic intracranial hypertension (originally placed May 30, 2017)  3  ophthalmology evaluation noted No optic nerve edemaPain control per primary team   4  Shunt series:  Intact catheter with Codman Hakim shunt set to 70mm [id number] upper extremity occlusive thrombophlebitis cephalic vein  5  Final CSF cx from 1/30 no growth  6  Leukocytosis continues to increase, and fluid cultures 1/31 remain positive  7  Peritonitis   8  Gastric perforation status post repair    Plan:  · Exam reveals diffuse weakness  Alert and slow to answer questions  Following commands  Dressing intact  · Externalized shunt clamped  Monitor exam    · Tentative plan for removal of shunt  Given extensive abdominal infection, recommend against return of ventricular catheter into abdominal cavity  Exam grossly stable since clamp  No papilledema noted by ophtho  Timing to be discussed with primary team - if [facility] feel patient able/safe, could proceed to OR on 2/5 for removal  Would require GETA  Will need to stop jejunal feedings at MN and hold HSQ if plan is to proceed  · SSC following -  ADENIKE drains x5  Trend white count   · Repeat CT scan CAP w 1/30/18 with persistent leak  SCC considering repeat CT A/P today  · Status post IR perisplenic drain placement bilateral chest tubes for pleural effusion along with Keofed placement    GI recommend defer use of Keofed and recommend continue TPN  · Chest tube placed to suction until cultures negative  · Infectious disease following - on IV Unasyn anticipated 4-6 weeks  On fluconazole pending most recent abscess culture  Follow-up culture of perisplenic collection  · Ongoing medical management per primary team   · APS following  · DVT PPX:  Heparin and SCDs on during exam   · Will continue to follow  Subjective/Objective   Chief Complaint: follow-up externalization shunt    Subjective:  Complaining of diffuse abdominal pain control medications  Denies headache or vision changes      MEDS:      Current Facility-Administered Medications:     acetaminophen (TYLENOL) rectal suppository 325 mg, 325 mg, Rectal, Q4H PRN, Fracisco Castañeda MD, 325 mg at 02/03/18 0047    Adult 3-in-1 TPN (custom base / custom electrolytes), , Intravenous, Continuous, Vineet Decker DO, Last Rate: 69 6 mL/hr at 02/04/18 0600    Adult 3-in-1 TPN (custom base / custom electrolytes), , Intravenous, Continuous, Vineet Decker DO    ampicillin-sulbactam (UNASYN) 3 g in sodium chloride 0 9 % 100 mL IVPB, 3 g, Intravenous, Q6H, Jay Cummins MD, Last Rate: 200 mL/hr at 02/04/18 0403, 3 g at 02/04/18 0403    fluconazole (DIFLUCAN) IVPB (premix) 400 mg, 400 mg, Intravenous, Q24H, ABNER Tse, Last Rate: 100 mL/hr at 02/03/18 1628, 400 mg at 02/03/18 1628    glycopyrrolate (ROBINUL) injection 0 2 mg, 0 2 mg, Intravenous, Q4H PRN, Valery Rea MD    haloperidol lactate (HALDOL) injection 2 mg, 2 mg, Intramuscular, Q30 Min PRN, Valery Rea MD    heparin (porcine) subcutaneous injection 5,000 Units, 5,000 Units, Subcutaneous, Q8H [facility], ABNER Tse, 5,000 Units at 02/04/18 0530    HYDROmorphone (DILAUDID) injection 0 5 mg, 0 5 mg, Intravenous, Q3H PRN, Tyler Keller PA-C, 0 5 mg at 02/03/18 2043    HYDROmorphone (DILAUDID) injection 1 mg, 1 mg, Intravenous, Q3H PRN, Adri Iqbal MD, 1 mg at 02/04/18 0535    insulin lispro (HumaLOG) 100 units/mL subcutaneous injection 1-5 Units, 1-5 Units, Subcutaneous, Q6H [facility], 1 Units at 02/04/18 0045 **AND** Fingerstick Glucose (POCT), , , Q6H, Nayla Mcconnell PA-C    iohexol (OMNIPAQUE) 240 MG/ML solution 50 mL, 50 mL, Oral, 90 min pre-procedure, John Paul Parker MD    ketamine 250 mg (STANDARD CONCENTRATION) IV in sodium chloride 0 9% 250 mL, 0 2 mg/kg/hr, Intravenous, Continuous, Bacilio Cat MD, Last Rate: 14 1 mL/hr at 02/04/18 0600, 0 2 mg/kg/hr at 02/04/18 0600    lidocaine (LIDODERM) 5 % patch 2 patch, 2 patch, Transdermal, Daily, Sudarshan Singleton PA-C, 2 patch at 02/03/18 0947    LORazepam (ATIVAN) 2 mg/mL injection 1 mg, 1 mg, Intramuscular, Q1H PRN, Bacilio Cat MD    mirtazapine (REMERON SOL-TAB) dispersible tablet 15 mg, 15 mg, Oral, HS, Nayla Mcconnell PA-C, 15 mg at 02/03/18 2218    pantoprazole (PROTONIX) injection 40 mg, 40 mg, Intravenous, Q24H [facility], ABNER Tse, 40 mg at 02/03/18 0947      Objective:     Physical Exam:  Vitals: Blood pressure 140/86, pulse 92, temperature 98 8 °F (37 1 °C), resp  rate 21, height 5' 4" (1 626 m), weight 70 7 kg (155 lb 13 8 oz), SpO2 93 %, not currently breastfeeding  ,Body mass index is 26 75 kg/m²  Tmax 100 3 F overnight        General appearance:  Drowsy, appears stated age, cooperative and no distress  Head: Normocephalic, without obvious abnormality, atraumatic  Eyes: EOMI good upgaze,   Neck: supple, symmetrical, trachea midline   Lungs: non labored breathing  Heart: regular heart rate  Neurologic:   Mental status:  Drowsy, oriented, thought content appropriate, appropriate slow responses  Cranial nerves: grossly intact (Cranial nerves II-XII)  Sensory: normal to LT  Motor: moving all extremities with diffuse weakness proximal greater than distal      Lab Results:    Results from last 7 days  Lab Units 02/03/18  0438 02/02/18  0415 02/01/18  0517   WBC Thousand/uL 25 55* 23 32* 17 85*   HEMOGLOBIN g/dL 9 0* 9 0* 7 9*   HEMATOCRIT % 27 9* 28 1* 24 2*   PLATELETS Thousands/uL 648* 730* 650*   MONO PCT MAN % 3* 2* 3*       Results from last 7 days  Lab Units 02/04/18  0535 02/03/18  0438 02/02/18  0415   SODIUM mmol/L 136 139 139   POTASSIUM mmol/L 3 9 4 1 3 6   CHLORIDE mmol/L 97* 100 101   CO2 mmol/L 35* 35* 35*   BUN mg/dL 18 19 18   CREATININE mg/dL 0 30* 0 27* 0 27*   CALCIUM mg/dL 8 7 8 6 9 0   TOTAL PROTEIN g/dL  --   --  4 9*   BILIRUBIN TOTAL mg/dL  --   --  0 38   ALK PHOS U/L  --   --  104   ALT U/L  --   --  15   AST U/L  --   --  15   GLUCOSE RANDOM mg/dL 144* 139 159*       Results from last 7 days  Lab Units 02/04/18  0535 02/03/18  0438 02/02/18  0415   MAGNESIUM mg/dL 2 0 1 9 1 9       Results from last 7 days  Lab Units 02/04/18  0535 02/03/18  0438 02/02/18  0415   PHOSPHORUS mg/dL 3 6 3 3 2 8           VTE Pharmacologic Prophylaxis: Heparin    VTE Mechanical Prophylaxis: sequential compression device

## 2018-02-04 NOTE — PROGRESS NOTES
Progress Note - Anesthesia Acute Pain Management    Dede Shanks 36 y o  female MRN: [id number]  Unit/Bed#: ICU 10 Encounter: [id number]      Assessment:   Principal Problem:    Gastric leak  Active Problems:    Acute peritonitis    Idiopathic intracranial hypertension    S/P  shunt     (ventriculoperitoneal) shunt status    Murmur, cardiac    Refusal of blood product    Gastroesophageal reflux disease    Choroidal nevus, right eye      Plan:   - Pt reports continued adequate pain control on current regimen  Pain score currently 7/10, abdominal pain, but pt says it often goes as low as 5/10  She denies side effects from the Ketamine  She is easy to wake and answers questions appropriately  - She is on day #3 of Ketamine, however, she may possibly go to the OR again tomorrow   - Recommend: No changes to current regimen until plans for OR are decided  If pt to go to OR in near future, would continue Ketamine perioperatively  If no plans for OR in the immediate future, consider weaning Ketamine off  - APS will continue to follow  Pain History  Current pain location(s): Abdomen  Pain Scale: 7/10    Meds/Allergies   all current active meds have been reviewed    Allergies   Allergen Reactions    Benadryl [Diphenhydramine] Swelling    Phenergan [Promethazine] Hives       Objective     Temp:  [98 2 °F (36 8 °C)-100 °F (37 8 °C)] 98 8 °F (37 1 °C)  HR:  [] 92  Resp:  [15-35] 21  BP: (131-162)/(69-87) 140/86      Intake/Output Summary (Last 24 hours) at 02/04/18 0803  Last data filed at 02/04/18 0630   Gross per 24 hour   Intake          2875 03 ml   Output             4117 ml   Net         -1241 97 ml                 Physical Exam: /86   Pulse 92   Temp 98 8 °F (37 1 °C)   Resp 21   Ht 5' 4" (1 626 m)   Wt 70 7 kg (155 lb 13 8 oz)   LMP  (LMP Unknown)   SpO2 93%   Breastfeeding?  No   BMI 26 75 kg/m²     Gen: NAD  CV: deferred  Pul: nonlabored  Abd: deferred  Ext:  No cyanosis  Neuro: Awake, oriented x3 and answers questions appropriately    Lab Results:  Lab Results   Component Value Date    WBC 25 55 (H) 02/03/2018    HGB 9 0 (L) 02/03/2018    HCT 27 9 (L) 02/03/2018    MCV 92 02/03/2018     (H) 02/03/2018     Lab Results   Component Value Date    GLUCOSE 144 (H) 02/04/2018    CALCIUM 8 7 02/04/2018     02/04/2018    K 3 9 02/04/2018    CO2 35 (H) 02/04/2018    CL 97 (L) 02/04/2018    BUN 18 02/04/2018    CREATININE 0 30 (L) 02/04/2018     Imaging Studies: I have personally reviewed pertinent reports  EKG, Pathology, and Other Studies: I have personally reviewed pertinent reports        SIGNATURE: Anali Ceja MD  DATE: February 4, 2018  TIME: 8:03 AM

## 2018-02-04 NOTE — PROGRESS NOTES
Progress Note - Thoracic  Sumner Arrow 36 y o  female MRN: [id number]  Unit/Bed#: ICU 10-01 Encounter: [id number]    Assessment:  36y o -year-old female with gastric perforation s/p hiatal hernia repair at outside hospital, s/p esophageal stent placement, exploratory laparotomy and washout  Has had persistent esophageal/gastric leak on imaging  Had bilateral pleural effusions which were drained  Perisplenic fluid collection was drained  Plan:  1  Gastric perforation   - KUB showed keofed in distal duodenum/proximal jejunum, tube feeds started Jevguy [email or web address], continue to advance very slowly   - continue drains   - continue TPN   - abx per ID is unasyn / fluconazole from cultures growing enterococcus    - ketamine gtt wean as tolerated    2  Leukocytosis   - persistent despite antibiotics   - await CBC today; if leukocytosis is persistent would obtain CT-chest/abdomen/pelvis     3  Externalized  shunt   - possible removal next week by neurosurgery    4  DVT Prophylaxis   - SQH   - SCDs    5  Disposition   - ICU while  shunt is externalized    Escobar Charles MD PGY-4  6:24 AM  02/04/18      Subjective:  Patient reports that she "felt better in the past" but she is overall improving  Tube feeds started at trickle rate without incident after KUB confirmed post-pyloric keofed  Passing flatus       Objective:  Patient Vitals for the past 24 hrs:   BP Temp Temp src Pulse Resp SpO2   02/04/18 0440 148/72 - - 94 17 95 %   02/04/18 0340 145/78 - - 98 15 94 %   02/04/18 0240 159/76 - - (!) 108 (!) 32 95 %   02/04/18 0143 152/81 - - 98 (!) 26 93 %   02/04/18 0043 157/86 - - 98 18 95 %   02/03/18 2343 149/80 100 °F (37 8 °C) Oral 96 17 94 %   02/03/18 2243 151/85 - - 98 20 95 %   02/03/18 2143 152/81 - - 98 (!) 28 94 %   02/03/18 2043 141/81 - - 98 (!) 29 94 %   02/03/18 1943 131/81 98 2 °F (36 8 °C) Oral 100 21 95 %   02/03/18 1909 - - - 94 22 93 %   02/03/18 1843 162/86 - - 96 22 95 %   02/03/18 1743 154/77 - - 98 20 94 %   02/03/18 1700 147/78 - - 90 20 95 %   02/03/18 1620 - - - 94 (!) 28 94 %   02/03/18 1544 155/86 99 3 °F (37 4 °C) Oral 96 (!) 35 95 %   02/03/18 1400 158/77 - - 92 (!) 30 94 %   02/03/18 1300 152/78 - - 90 22 95 %   02/03/18 1200 154/77 98 4 °F (36 9 °C) Oral 96 (!) 26 97 %   02/03/18 1100 138/74 - - 92 15 97 %   02/03/18 1000 153/69 - - 94 (!) 33 94 %   02/03/18 0900 157/80 - - 86 21 99 %   02/03/18 0800 158/82 97 7 °F (36 5 °C) Oral 90 (!) 26 99 %          Diet Orders            Start     Ordered    02/03/18 1704  Diet Enteral/Parenteral; Tube Feeding No Oral Diet; Jevity 1 2 Claude; 10  Diet effective now     Question Answer Comment   Diet Type Enteral/Parenteral    Enteral/Parenteral Tube Feeding No Oral Diet    Tube Feeding Formula: Jevity 1 2 Claude    Tube Feeding Continuous rate (mL/hr): 10    RD to adjust diet per protocol?  No        02/03/18 1703          Intake/Output Summary (Last 24 hours) at 02/04/18 0624  Last data filed at 02/04/18 0403   Gross per 24 hour   Intake          3026 61 ml   Output             3387 ml   Net          -360 39 ml   UOP 2 4  R ADENIKE 10 serous  R ADENIKE 10 serous  L ADENIKE 12 serous  L ADENIKE 10 serous  L ADENIKE spleen 60 serosanguinous  Ventriculostomy 0  CT L 290 serous -AL sxn  CT R 320 serous -AL sxn     [email or web address] [email or web address]    Physical Exam:  General: NAD  HEENT: externalized  shunt  Cardiovascular: RRR  Respiratory: breath sounds b/l  Abdomen: soft, NT, mild distension, incision c/d/i  Extremities: no edema    Medications:    Current Facility-Administered Medications:  acetaminophen 325 mg Rectal Q4H PRN Amina Brizuela MD    Adult TPN (CUSTOM BASE/CUSTOM ELECTROLYTE)  Intravenous Continuous Vineet Decker DO Last Rate: 69 6 mL/hr at 02/04/18 0403   ampicillin-sulbactam 3 g Intravenous Q6H Marcell Dias MD Last Rate: 3 g (02/04/18 0403)   fluconazole 400 mg Intravenous Q24H ABNER Teran Last Rate: 400 mg (02/03/18 1628)   glycopyrrolate 0 2 mg Intravenous Q4H PRN Chencho Whitney Rebecca Cole MD    haloperidol lactate 2 mg Intramuscular Q30 Min PRN Luis Daniel Chris MD    heparin (porcine) 5,000 Units Subcutaneous [facility] ABNER Steel    HYDROmorphone 0 5 mg Intravenous Q3H PRN Loree Schroeder PA-C    HYDROmorphone 1 mg Intravenous Q3H PRN Eduin Verdin MD    insulin lispro 1-5 Units Subcutaneous Q6H [facility] Margaret Deborah Mcconnell PA-C    iohexol 50 mL Oral 90 min pre-procedure Francis Dale MD    ketamine 0 2 mg/kg/hr Intravenous Continuous Luis Daniel Chris MD Last Rate: 0 2 mg/kg/hr (02/04/18 0403)   lidocaine 2 patch Transdermal Daily Bishop Candido PA-C    LORazepam 1 mg Intramuscular Q1H PRN Luis Daniel Chris MD    mirtazapine 15 mg Oral HS Nayla JUAN Mcconnell PA-C    pantoprazole 40 mg Intravenous Q24H [facility] ABNER Mcfarland        Adult TPN (CUSTOM BASE/CUSTOM ELECTROLYTE)  Last Rate: 69 6 mL/hr at 02/04/18 0403   ketamine 0 2 mg/kg/hr Last Rate: 0 2 mg/kg/hr (02/04/18 0403)       acetaminophen 325 mg Q4H PRN   glycopyrrolate 0 2 mg Q4H PRN   haloperidol lactate 2 mg Q30 Min PRN   HYDROmorphone 0 5 mg Q3H PRN   HYDROmorphone 1 mg Q3H PRN   LORazepam 1 mg Q1H PRN     Laboratory results:   CBC:   No results found for: WBC, HGB, HCT, MCV, PLT, ADJUSTEDWBC, MCH, MCHC, RDW, MPV, NRBC, CMP:   No results found for: NA, K, CL, CO2, ANIONGAP, BUN, CREATININE, GLUCOSE, CALCIUM, AST, ALT, ALKPHOS, PROT, ALBUMIN, BILITOT, EGFR, Coagulation: No results found for: PT, INR, APTT, Urinalysis: No results found for: COLORU, CLARITYU, SPECGRAV, PHUR, LEUKOCYTESUR, NITRITE, PROTEINUA, GLUCOSEU, KETONESU, BILIRUBINUR, BLOODU, Amylase: No results found for: AMYLASE, Lipase: No results found for: LIPASE    VTE Pharmacologic Prophylaxis: Heparin  VTE Mechanical Prophylaxis: sequential compression device

## 2018-02-04 NOTE — PROGRESS NOTES
Progress Note - Infectious Disease   Michelle Melo 36 y o  female MRN: [id number]  Unit/Bed#: ICU 10 Encounter: [id number]      Impression/Recommendations:  1   Intra-abdominal/pelvic abscesses   Patient is status post repeat abdominal washout   She has multiple drains in place   She is status post placement of drainage in a new perisplenic collection   She is clinically well and systemically stable  Operative culture from [facility], growing only ampicillin susceptible Enterococcus   Operative culture here also growing  only ampicillin susceptible Enterococcus   Latest culture also grew yeast, in addition to Enterococcus  Continue with IV Unasyn/fluconazole  Follow-up on culture of new perisplenic collection      2   Possible infected  shunt   Given presence of tip of  shunt in peritoneal space, there is high probability of  shunt infection   Fortunately, patient has no symptoms concerning for meningitis   She has neurological deficits   She is status post tapping of  shunt at [facility]   CSF culture there had no growth but patient had prior antibiotic  Jose Free will go ahead and treat her for possible/presumptive  shunt infection   Patient is now status post  shunt externalization   CSF culture here also negative  Plan for total resection of  shunt next week noted  With removal shunt, antibiotic course can be decreased to 2-3 weeks     Antibiotic plan as in above  Likely treat with IV antibiotic x 2-3 weeks      3   Gastric perforation, status post repair, with persistent leak   She is now status post placement of esophageal stent   Repeat video barium swallow from last week with no further leakage   Unfortunately, repeat abdomen/pelvis CT showed recurrent leak with new perisplenic collection   She is now status post placement of duodenal feeding tube and drainage of the perisplenic collection   However, both Critical Care service and GI service feel that patient should stay on TPN and not use feeding tube for enteral feeding  Monitor for recurrent leak  Patient will most likely need a repeat barium swallow down the line      4   Recurrent leukocytosis  WBC had decreased with recent drainage of abscess  However, WBC is now increased again with low-grade fever   Will monitor closely   If WBC continues to increase, now > 30, will need to repeat abdomen/pelvis CT in the next few days  Antibiotic plan as in above  Monitor WBC and temperature      5  Septic shock, on presentation at [facility]   This is secondary to gastric perforation   Patient is now hemodynamically stable   All cultures from [facility] obtained and reviewed, now on chart   All blood cultures were negative   Urine culture negative   CSF culture negative   Operative culture positive for Enterococcus, as in above  Antibiotic plan as in above  Monitor hemodynamics      Discussed with the patient and her family in detail regarding above plan      Antibiotics:  Unasyn  POD # 10  Fluconazole # 6     Subjective:  Patient is awake and alert  She remains in ICU  Patient's abdominal pain is stable and controlled     No headache  No chest wall pain  Temperature low-grade   No chills  She is tolerating antibiotic well   No nausea, vomiting or diarrhea  Objective:  Vitals:  HR:  [] 92  Resp:  [15-35] 21  BP: (131-162)/(72-87) 147/79  SpO2:  [93 %-97 %] 96 %  Temp (24hrs), Av 9 °F (37 2 °C), Min:98 2 °F (36 8 °C), Max:100 °F (37 8 °C)  Current: Temperature: 98 4 °F (36 9 °C)    Physical Exam:     General: Awake, alert, cooperative, no distress  Chest:  VPS site clean  No drainage  No erythema  No tenderness  Lungs: Expansion symmetric, no rales, no wheezing, respirations unlabored  Heart[de-identified]  Mildly tachycardic with regular rhythm, S1 and S2 normal, no murmur  Abdomen: Soft, stable mild distention  Incision clean  Stable incisional tenderness  Drains seropurulent still  Extremities: Trace edema  No erythema/warmth  No ulcer  Nontender to palpation  Skin:  No rash  Invasive Devices     Peripherally Inserted Central Catheter Line            PICC Line 05/05/79 Right Basilic 14 days          Drain            Closed/Suction Drain Left LLQ Bulb 19 Fr  9 days    Closed/Suction Drain Left LUQ Bulb 19 Fr  9 days    Closed/Suction Drain Right RLQ Bulb 19 Fr  9 days    Closed/Suction Drain Right RUQ Accordion 19 Fr  9 days    Ventriculostomy/Subdural Ventricular drainage catheter Right 9 days    Chest Tube 1 Right Pleural 10 Fr  4 days    Chest Tube 2 Left Pleural 10 Fr  4 days    Closed/Suction Drain Left Back Bulb 12 Fr  4 days    NG/OG/Enteral Tube Enteral Feeding Tube Right nares 4 days                Labs studies:   I have personally reviewed pertinent labs      Results from last 7 days  Lab Units 02/04/18  0535 02/03/18  0438 02/02/18  0415   SODIUM mmol/L 136 139 139   POTASSIUM mmol/L 3 9 4 1 3 6   CHLORIDE mmol/L 97* 100 101   CO2 mmol/L 35* 35* 35*   ANION GAP mmol/L 4 4 3*   BUN mg/dL 18 19 18   CREATININE mg/dL 0 30* 0 27* 0 27*   EGFR ml/min/1 73sq m 141 146 146   GLUCOSE RANDOM mg/dL 144* 139 159*   CALCIUM mg/dL 8 7 8 6 9 0   AST U/L  --   --  15   ALT U/L  --   --  15   ALK PHOS U/L  --   --  104   TOTAL PROTEIN g/dL  --   --  4 9*   BILIRUBIN TOTAL mg/dL  --   --  0 38       Results from last 7 days  Lab Units 02/04/18  0535 02/03/18  0438 02/02/18  0415   WBC Thousand/uL 31 47* 25 55* 23 32*   HEMOGLOBIN g/dL 9 5* 9 0* 9 0*   PLATELETS Thousands/uL 731* 648* 730*       Results from last 7 days  Lab Units 01/31/18  0902 01/31/18  0845 01/31/18  0842 01/30/18  1151   GRAM STAIN RESULT  No Polys or Bacteria seen Rare Polys  No bacteria seen 1+ Polys  No bacteria seen No Polys or Bacteria seen   BODY FLUID CULTURE, STERILE  Few Colonies of Enterococcus faecalis*  Few Colonies of Yeast* No growth No growth  --        Imaging Studies:   I have personally reviewed pertinent imaging study reports and images in PACS  EKG, Pathology, and Other Studies:   I have personally reviewed pertinent reports

## 2018-02-04 NOTE — NUTRITION
Rec incr Jevity 1 2 by 10 ml q 4 hrs as eleonora to goal rate of 59 ml/hr + 1 PROSOURCE/day  to = qd: 1416 ml,1759 Total kcal,94 gm PRO,240 gm CHO,56 gm Fat,1143 ml Free H2O,2 4 mg CHO/kg/min  Rec concurrently wean off TPN as TF is increased to goal rate & tolerated

## 2018-02-05 ENCOUNTER — ANESTHESIA (INPATIENT)
Dept: PERIOP | Facility: HOSPITAL | Age: 43
DRG: 711 | End: 2018-02-05
Payer: COMMERCIAL

## 2018-02-05 ENCOUNTER — APPOINTMENT (INPATIENT)
Dept: RADIOLOGY | Facility: HOSPITAL | Age: 43
DRG: 711 | End: 2018-02-05
Payer: COMMERCIAL

## 2018-02-05 ENCOUNTER — ANESTHESIA EVENT (INPATIENT)
Dept: PERIOP | Facility: HOSPITAL | Age: 43
DRG: 711 | End: 2018-02-05
Payer: COMMERCIAL

## 2018-02-05 LAB
ANION GAP SERPL CALCULATED.3IONS-SCNC: 4 MMOL/L (ref 4–13)
BASOPHILS # BLD AUTO: 0.06 THOUSANDS/ΜL (ref 0–0.1)
BASOPHILS NFR BLD AUTO: 0 % (ref 0–1)
BUN SERPL-MCNC: 17 MG/DL (ref 5–25)
CALCIUM SERPL-MCNC: 8.5 MG/DL (ref 8.3–10.1)
CHLORIDE SERPL-SCNC: 97 MMOL/L (ref 100–108)
CO2 SERPL-SCNC: 34 MMOL/L (ref 21–32)
CREAT SERPL-MCNC: 0.28 MG/DL (ref 0.6–1.3)
EOSINOPHIL # BLD AUTO: 0.47 THOUSAND/ΜL (ref 0–0.61)
EOSINOPHIL NFR BLD AUTO: 2 % (ref 0–6)
ERYTHROCYTE [DISTWIDTH] IN BLOOD BY AUTOMATED COUNT: 15.8 % (ref 11.6–15.1)
GFR SERPL CREATININE-BSD FRML MDRD: 144 ML/MIN/1.73SQ M
GLUCOSE SERPL-MCNC: 132 MG/DL (ref 65–140)
GLUCOSE SERPL-MCNC: 137 MG/DL (ref 65–140)
GLUCOSE SERPL-MCNC: 163 MG/DL (ref 65–140)
GLUCOSE SERPL-MCNC: 165 MG/DL (ref 65–140)
GLUCOSE SERPL-MCNC: 166 MG/DL (ref 65–140)
HCT VFR BLD AUTO: 28 % (ref 34.8–46.1)
HGB BLD-MCNC: 9 G/DL (ref 11.5–15.4)
LYMPHOCYTES # BLD AUTO: 2.04 THOUSANDS/ΜL (ref 0.6–4.47)
LYMPHOCYTES NFR BLD AUTO: 8 % (ref 14–44)
MAGNESIUM SERPL-MCNC: 2 MG/DL (ref 1.6–2.6)
MCH RBC QN AUTO: 29.6 PG (ref 26.8–34.3)
MCHC RBC AUTO-ENTMCNC: 32.1 G/DL (ref 31.4–37.4)
MCV RBC AUTO: 92 FL (ref 82–98)
MONOCYTES # BLD AUTO: 1.78 THOUSAND/ΜL (ref 0.17–1.22)
MONOCYTES NFR BLD AUTO: 7 % (ref 4–12)
NEUTROPHILS # BLD AUTO: 21.84 THOUSANDS/ΜL (ref 1.85–7.62)
NEUTS SEG NFR BLD AUTO: 83 % (ref 43–75)
NRBC BLD AUTO-RTO: 0 /100 WBCS
PLATELET # BLD AUTO: 640 THOUSANDS/UL (ref 149–390)
PMV BLD AUTO: 8.9 FL (ref 8.9–12.7)
POTASSIUM SERPL-SCNC: 3.6 MMOL/L (ref 3.5–5.3)
RBC # BLD AUTO: 3.04 MILLION/UL (ref 3.81–5.12)
SODIUM SERPL-SCNC: 135 MMOL/L (ref 136–145)
VIRUS SPEC CULT: NORMAL
WBC # BLD AUTO: 26.68 THOUSAND/UL (ref 4.31–10.16)

## 2018-02-05 PROCEDURE — 87206 SMEAR FLUORESCENT/ACID STAI: CPT | Performed by: NEUROLOGICAL SURGERY

## 2018-02-05 PROCEDURE — 99231 SBSQ HOSP IP/OBS SF/LOW 25: CPT | Performed by: THORACIC SURGERY (CARDIOTHORACIC VASCULAR SURGERY)

## 2018-02-05 PROCEDURE — 87116 MYCOBACTERIA CULTURE: CPT | Performed by: NEUROLOGICAL SURGERY

## 2018-02-05 PROCEDURE — 87102 FUNGUS ISOLATION CULTURE: CPT | Performed by: NEUROLOGICAL SURGERY

## 2018-02-05 PROCEDURE — 99291 CRITICAL CARE FIRST HOUR: CPT | Performed by: PHYSICIAN ASSISTANT

## 2018-02-05 PROCEDURE — 83735 ASSAY OF MAGNESIUM: CPT | Performed by: PHYSICIAN ASSISTANT

## 2018-02-05 PROCEDURE — 99233 SBSQ HOSP IP/OBS HIGH 50: CPT | Performed by: INTERNAL MEDICINE

## 2018-02-05 PROCEDURE — 99024 POSTOP FOLLOW-UP VISIT: CPT | Performed by: NEUROLOGICAL SURGERY

## 2018-02-05 PROCEDURE — 71045 X-RAY EXAM CHEST 1 VIEW: CPT

## 2018-02-05 PROCEDURE — C9113 INJ PANTOPRAZOLE SODIUM, VIA: HCPCS | Performed by: NURSE PRACTITIONER

## 2018-02-05 PROCEDURE — 82948 REAGENT STRIP/BLOOD GLUCOSE: CPT

## 2018-02-05 PROCEDURE — 80048 BASIC METABOLIC PNL TOTAL CA: CPT | Performed by: PHYSICIAN ASSISTANT

## 2018-02-05 PROCEDURE — 88300 SURGICAL PATH GROSS: CPT | Performed by: NEUROLOGICAL SURGERY

## 2018-02-05 PROCEDURE — 88300 SURGICAL PATH GROSS: CPT | Performed by: PATHOLOGY

## 2018-02-05 PROCEDURE — 62256 REMOVE BRAIN CAVITY SHUNT: CPT | Performed by: NEUROLOGICAL SURGERY

## 2018-02-05 PROCEDURE — 87205 SMEAR GRAM STAIN: CPT | Performed by: NEUROLOGICAL SURGERY

## 2018-02-05 PROCEDURE — 00P63JZ REMOVAL OF SYNTHETIC SUBSTITUTE FROM CEREBRAL VENTRICLE, PERCUTANEOUS APPROACH: ICD-10-PCS | Performed by: NEUROLOGICAL SURGERY

## 2018-02-05 PROCEDURE — 85025 COMPLETE CBC W/AUTO DIFF WBC: CPT | Performed by: PHYSICIAN ASSISTANT

## 2018-02-05 PROCEDURE — 87070 CULTURE OTHR SPECIMN AEROBIC: CPT | Performed by: NEUROLOGICAL SURGERY

## 2018-02-05 RX ORDER — METOPROLOL TARTRATE 5 MG/5ML
5 INJECTION INTRAVENOUS EVERY 6 HOURS
Status: DISCONTINUED | OUTPATIENT
Start: 2018-02-05 | End: 2018-02-06

## 2018-02-05 RX ORDER — LIDOCAINE HYDROCHLORIDE AND EPINEPHRINE 10; 10 MG/ML; UG/ML
INJECTION, SOLUTION INFILTRATION; PERINEURAL AS NEEDED
Status: DISCONTINUED | OUTPATIENT
Start: 2018-02-05 | End: 2018-02-05 | Stop reason: HOSPADM

## 2018-02-05 RX ORDER — SODIUM CHLORIDE, SODIUM LACTATE, POTASSIUM CHLORIDE, CALCIUM CHLORIDE 600; 310; 30; 20 MG/100ML; MG/100ML; MG/100ML; MG/100ML
INJECTION, SOLUTION INTRAVENOUS CONTINUOUS PRN
Status: DISCONTINUED | OUTPATIENT
Start: 2018-02-05 | End: 2018-02-05 | Stop reason: SURG

## 2018-02-05 RX ORDER — CHLORHEXIDINE GLUCONATE 0.12 MG/ML
15 RINSE ORAL ONCE
Status: COMPLETED | OUTPATIENT
Start: 2018-02-05 | End: 2018-02-05

## 2018-02-05 RX ORDER — POTASSIUM CHLORIDE 29.8 MG/ML
40 INJECTION INTRAVENOUS ONCE
Status: COMPLETED | OUTPATIENT
Start: 2018-02-05 | End: 2018-02-07

## 2018-02-05 RX ORDER — FENTANYL CITRATE 50 UG/ML
INJECTION, SOLUTION INTRAMUSCULAR; INTRAVENOUS AS NEEDED
Status: DISCONTINUED | OUTPATIENT
Start: 2018-02-05 | End: 2018-02-05 | Stop reason: SURG

## 2018-02-05 RX ADMIN — HYDROMORPHONE HYDROCHLORIDE 1 MG: 1 INJECTION, SOLUTION INTRAMUSCULAR; INTRAVENOUS; SUBCUTANEOUS at 03:30

## 2018-02-05 RX ADMIN — HYDROMORPHONE HYDROCHLORIDE 1 MG: 1 INJECTION, SOLUTION INTRAMUSCULAR; INTRAVENOUS; SUBCUTANEOUS at 20:04

## 2018-02-05 RX ADMIN — PANTOPRAZOLE SODIUM 40 MG: 40 INJECTION, POWDER, FOR SOLUTION INTRAVENOUS at 08:22

## 2018-02-05 RX ADMIN — INSULIN LISPRO 1 UNITS: 100 INJECTION, SOLUTION INTRAVENOUS; SUBCUTANEOUS at 00:49

## 2018-02-05 RX ADMIN — THIAMINE HYDROCHLORIDE: 100 INJECTION, SOLUTION INTRAMUSCULAR; INTRAVENOUS at 22:13

## 2018-02-05 RX ADMIN — Medication 3 G: at 22:12

## 2018-02-05 RX ADMIN — SODIUM CHLORIDE, SODIUM LACTATE, POTASSIUM CHLORIDE, AND CALCIUM CHLORIDE: .6; .31; .03; .02 INJECTION, SOLUTION INTRAVENOUS at 11:42

## 2018-02-05 RX ADMIN — HYDROMORPHONE HYDROCHLORIDE 1 MG: 1 INJECTION, SOLUTION INTRAMUSCULAR; INTRAVENOUS; SUBCUTANEOUS at 08:57

## 2018-02-05 RX ADMIN — Medication 3 G: at 15:00

## 2018-02-05 RX ADMIN — FENTANYL CITRATE 50 MCG: 50 INJECTION, SOLUTION INTRAMUSCULAR; INTRAVENOUS at 11:49

## 2018-02-05 RX ADMIN — KETAMINE HYDROCHLORIDE 0.2 MG: 50 INJECTION, SOLUTION INTRAMUSCULAR; INTRAVENOUS at 10:35

## 2018-02-05 RX ADMIN — LIDOCAINE 2 PATCH: 50 PATCH TOPICAL at 08:22

## 2018-02-05 RX ADMIN — Medication 0.2 MG/KG/HR: at 11:42

## 2018-02-05 RX ADMIN — THIAMINE HYDROCHLORIDE 69.6 ML/HR: 100 INJECTION, SOLUTION INTRAMUSCULAR; INTRAVENOUS at 11:42

## 2018-02-05 RX ADMIN — HYDROMORPHONE HYDROCHLORIDE 0.5 MG: 1 INJECTION, SOLUTION INTRAMUSCULAR; INTRAVENOUS; SUBCUTANEOUS at 22:11

## 2018-02-05 RX ADMIN — Medication 3 G: at 10:36

## 2018-02-05 RX ADMIN — Medication 3 G: at 03:33

## 2018-02-05 RX ADMIN — FENTANYL CITRATE 50 MCG: 50 INJECTION, SOLUTION INTRAMUSCULAR; INTRAVENOUS at 12:13

## 2018-02-05 RX ADMIN — Medication 3 G: at 11:30

## 2018-02-05 RX ADMIN — CHLORHEXIDINE GLUCONATE 15 ML: 1.2 RINSE ORAL at 11:05

## 2018-02-05 RX ADMIN — METOPROLOL TARTRATE 5 MG: 1 INJECTION, SOLUTION INTRAVENOUS at 15:00

## 2018-02-05 RX ADMIN — POTASSIUM CHLORIDE 40 MEQ: 400 INJECTION, SOLUTION INTRAVENOUS at 08:22

## 2018-02-05 RX ADMIN — HYDROMORPHONE HYDROCHLORIDE 1 MG: 1 INJECTION, SOLUTION INTRAMUSCULAR; INTRAVENOUS; SUBCUTANEOUS at 14:57

## 2018-02-05 RX ADMIN — FLUCONAZOLE 400 MG: 2 INJECTION INTRAVENOUS at 15:00

## 2018-02-05 RX ADMIN — INSULIN LISPRO 1 UNITS: 100 INJECTION, SOLUTION INTRAVENOUS; SUBCUTANEOUS at 17:56

## 2018-02-05 RX ADMIN — METOPROLOL TARTRATE 5 MG: 1 INJECTION, SOLUTION INTRAVENOUS at 22:11

## 2018-02-05 RX ADMIN — MIRTAZAPINE 15 MG: 15 TABLET, ORALLY DISINTEGRATING ORAL at 22:12

## 2018-02-05 RX ADMIN — METOPROLOL TARTRATE 5 MG: 1 INJECTION, SOLUTION INTRAVENOUS at 10:36

## 2018-02-05 NOTE — PROGRESS NOTES
This patient is well known to me  She underwent a procedure for reduction of obesity at [facility]   Unfortunately there was leakage from one of the anastomoses and the patient developed peritonitis  She has a indwelling ventriculo peritoneal shunt for pseudotumor cerebri  After arriving at [facility] the system was externalized at the clavicle  Ophthalmology was consulted and the retina and discs are noted to be flat without any palpable edema  Our plan will be to remove the entire system and replaced at only if absolutely necessary  Plan:  Removal of ventriculoperitoneal shunt    The risks, benefits and complications of surgery were explained in detail to [facility] PAIGECONSTANTINE ABRAHAM and her daughter  including hemorrhage, infection, CSF leakage, wound problems, pain, weakness, numbness, dysesthesiae, paralysis, coma, and death  Also, the possibility  of further surgery being required was emphasized  Other potential medical complications were outlined, including deep venous thrombosis, pulmonary embolism, pneumonia, urinary tract infection, myocardial infarction,  and stroke  The need for physical therapy, occupational therapy, and rehabilitation was also mentioned  The alternatives to surgery were discussed  [facility] MIRTA ABRAHAM and her daughter asked relevant questions and asked that we proceed with arrangements for surgery

## 2018-02-05 NOTE — PROGRESS NOTES
Progress Note - General Surgery   Octavia Srinivasan 36 y o  female MRN: [id number]  Unit/Bed#: ICU 10 Encounter: [id number]    Assessment:  36y o -year-old female with gastric perforation s/p hiatal hernia repair at outside hospital, s/p esophageal stent placement, exploratory laparotomy and washout  Has had persistent esophageal/gastric leak on imaging  Had bilateral pleural effusions which were drained  Perisplenic fluid collection was drained  - T max 99 8 last night   R  from 450, waterseal no airleak   L CT 60 fgrom 340 wateseal no airleak   WBC 26 toda from 31 yesterday      Plan:  Gastric perforation   - NPO for OR today, then resume feeds slowly               - keofed in distal duodenum/proximal jejunum, tube feeds started Jevguy [email or web address], continue to advance very slowly              - continue drains              - continue TPN              - abx per ID is unasyn / fluconazole from cultures growing enterococcus               - ketamine gtt wean as tolerated     Leukocytosis              - persistent despite antibiotics              - await CBC today; if leukocytosis is persistent, or febrile would obtain CT-chest/abdomen/pelvis      Externalized  shunt              - externalize today       DVT Prophylaxis              - SQH              - SCDs     Disposition              - ICU while  shunt is externalized    Subjective/Objective   Chief Complaint:     Subjective: no overnight events  No nausea/vomiting had been toleratign tube feeds, stopped     Objective:     Blood pressure 150/71, pulse 98, temperature 99 8 °F (37 7 °C), temperature source Oral, resp  rate (!) 23, height 5' 4" (1 626 m), weight 70 7 kg (155 lb 13 8 oz), SpO2 93 %, not currently breastfeeding  ,Body mass index is 26 75 kg/m²        Intake/Output Summary (Last 24 hours) at 02/05/18 0620  Last data filed at 02/05/18 0501   Gross per 24 hour   Intake          2891 66 ml   Output             3075 ml   Net          -183 34 ml Invasive Devices     Peripherally Inserted Central Catheter Line            PICC Line 42/17/81 Right Basilic 15 days          Drain            Closed/Suction Drain Left LLQ Bulb 19 Fr  10 days    Closed/Suction Drain Left LUQ Bulb 19 Fr  10 days    Closed/Suction Drain Right RLQ Bulb 19 Fr  10 days    Closed/Suction Drain Right RUQ Accordion 19 Fr  10 days    Ventriculostomy/Subdural Ventricular drainage catheter Right 10 days    Chest Tube 1 Right Pleural 10 Fr  4 days    Chest Tube 2 Left Pleural 10 Fr  4 days    Closed/Suction Drain Left Back Bulb 12 Fr  4 days    NG/OG/Enteral Tube Enteral Feeding Tube Right nares 4 days                Physical Exam: General: AAOx3  Respiratory: BS b/l  R CT wateseal, outpu serous no AL   L CT wateseal, output serous, no AL   Abdomen: Soft, NT, no rebound/guarding  RUQ/RLQ/LUQ/LLQ drain serosang   Perisplenic drain serous   Heart: RRR, S1s2  Ext: Warm no cyanosis       Lab, Imaging and other studies:  I have personally reviewed pertinent lab results    , CBC:   Lab Results   Component Value Date    WBC 26 68 (H) 02/05/2018    HGB 9 0 (L) 02/05/2018    HCT 28 0 (L) 02/05/2018    MCV 92 02/05/2018     (H) 02/05/2018    MCH 29 6 02/05/2018    MCHC 32 1 02/05/2018    RDW 15 8 (H) 02/05/2018    MPV 8 9 02/05/2018    NRBC 0 02/05/2018   , CMP: No results found for: NA, K, CL, CO2, ANIONGAP, BUN, CREATININE, GLUCOSE, CALCIUM, AST, ALT, ALKPHOS, PROT, ALBUMIN, BILITOT, EGFR  VTE Pharmacologic Prophylaxis: Sequential compression device (Venodyne)   VTE Mechanical Prophylaxis: sequential compression device

## 2018-02-05 NOTE — ANESTHESIA PREPROCEDURE EVALUATION
Review of Systems/Medical History  Patient summary reviewed  Chart reviewed  No history of anesthetic complications     Cardiovascular    Comment: ECHO 1/ 2018--EF 26%, normal systolic fxn, no reg abnml,  Pulmonary  Smoker ex-smoker  ,        GI/Hepatic    GERD ,   Comment: Hx laparoscopic paraesophageal hernia repair, gastric injury during surgery--s/p esophageal stent, dev  Sepsis req multiple surgical procedures, On jejunal feedings     Negative  ROS        Endo/Other    Comment: Right eye choroidal nevus    GYN  Negative gynecology ROS          Hematology  Negative hematology ROS      Musculoskeletal  Negative musculoskeletal ROS        Neurology      Comment: Idiopathic intracranial hypertension--s/p  shunt 5/30/2017--in light of current sepsis, neurosurgery rec  removing shunt Psychology   Negative psychology ROS              Physical Exam    Airway    Mallampati score: II  TM Distance: >3 FB       Dental   No notable dental hx     Cardiovascular  Rhythm: regular,     Pulmonary  Breath sounds clear to auscultation,     Other Findings        Anesthesia Plan  ASA Score- 3     Anesthesia Type- general with ASA Monitors  Additional Monitors:   Airway Plan: ETT  Comment: H/H 9/28, K 3 9, Cr  3  Plan Factors-Patient not instructed to abstain from smoking on day of procedure       Induction- intravenous  Postoperative Plan- Plan for postoperative opioid use  Planned trial extubation    Informed Consent- Anesthetic plan and risks discussed with patient  I personally reviewed this patient with the CRNA  Discussed and agreed on the Anesthesia Plan with the CRNA  Chalino Snow

## 2018-02-05 NOTE — OCCUPATIONAL THERAPY NOTE
Occupational Therapy Cancellation    Attempted to see pt this AM however pt off floor in OR  Will continue to follow pt on caseload to see for treatment at a later date      Kinjal Pantoja MS, OTR/L

## 2018-02-05 NOTE — PROGRESS NOTES
Interval Progress Note:    S:  Patient returned from OR status post uncomplicated  shunt removal   Patient is arousable and responsive but remains sedated  She has no complaints at this time  O:  Vitals:    02/05/18 1300   BP: 141/77   Pulse: 94   Resp: 12   Temp: 99 °F (37 2 °C)   SpO2: 95%     General: Appears lethargic and sedated,   HEENT: Incision over right temporal region clean/dry/intact, Keofed in place, NC on   Cardio: RRR, holosystolic murmur auscultated  Resp: Clear to auscultation on anterior exam, Right and left chest tubes remain in place and to water seal  Abdomen: Tender to palpation, 4 anterior abdominal drains in place  Extremities: SCDs in place, no tenderness    A:  37 yo female POD #0 following removal of venticuloperitoneal shunt in stable condition      P:  Continue ICU care   No post op labs necessary  Ketamine gtt for pain control w/ Dilaudid PRN for breakthrough pain   Follow  shunt tip culture  FEN: Trickle feeds with advance to goal, TPN until tolerating goal tube feeds  DVT Ppx: Sub Q Heparin and SCDs

## 2018-02-05 NOTE — CASE MANAGEMENT
Continued Stay Review    Date: 2/5/2018    Vital Signs: /77 (BP Location: Left arm)   Pulse 94   Temp 99 °F (37 2 °C) (Oral)   Resp 12   Ht 5' 4" (1 626 m)   Wt 70 7 kg (155 lb 13 8 oz)   LMP  (LMP Unknown)   SpO2 95%   Breastfeeding?  No   BMI 26 75 kg/m²     Medications:   Scheduled Meds:   Current Facility-Administered Medications:  acetaminophen 325 mg Rectal Q4H PRN Natacha Durham MD    Adult TPN (CUSTOM BASE/CUSTOM ELECTROLYTE)  Intravenous Continuous Vineet Decker DO Last Rate: 69 6 mL/hr at 02/04/18 2228   Adult TPN (CUSTOM BASE/CUSTOM ELECTROLYTE)  Intravenous Continuous Brandee Chatterjee PA-C Last Rate: 69 6 mL/hr (02/05/18 1142)   ampicillin-sulbactam 3 g Intravenous Q6H Michelle Delarosa MD Last Rate: 3 g (02/05/18 1036)   bisacodyl 10 mg Rectal Daily PRN Natacha Druham MD    fluconazole 400 mg Intravenous Q24H ABNER Mcgarry Last Rate: Stopped (02/04/18 1701)   HYDROmorphone 0 5 mg Intravenous Q3H PRN Herlinda Bellamy PA-C    HYDROmorphone 1 mg Intravenous Q3H PRN Roge Campos MD    insulin lispro 1-5 Units Subcutaneous Q6H [facility] Hung Mcconnell PA-C    iohexol 50 mL Oral 90 min pre-procedure Kareem Sanches MD    ketamine 0 2 mg/kg/hr Intravenous Continuous Rich Gong MD Last Rate: 0 2 mg/kg/hr (02/05/18 1142)   lidocaine 2 patch Transdermal Daily Charles Kingsley PA-C    metoprolol 5 mg Intravenous Q6H Nayla JUAN Mcconnell PA-C    mirtazapine 15 mg Oral HS Naylachristofer Mcconnell PA-C    pantoprazole 40 mg Intravenous Q24H [facility] ABNER Tse      Continuous Infusions:   Adult TPN (CUSTOM BASE/CUSTOM ELECTROLYTE)  Last Rate: 69 6 mL/hr at 02/04/18 2228   Adult TPN (CUSTOM BASE/CUSTOM ELECTROLYTE)  Last Rate: 69 6 mL/hr (02/05/18 1142)   ketamine 0 2 mg/kg/hr Last Rate: 0 2 mg/kg/hr (02/05/18 1142)     PRN Meds:   acetaminophen    bisacodyl    HYDROmorphone    HYDROmorphone    Abnormal Labs/Diagnostic Results: wbc 26 68--hgb 9 0/28 0--platlets 640  Na 135--cl 97--c02 34--cr 0 28    Age/Sex: 36 y o  female     Assessment/Plan: or--Patient returned from OR status post uncomplicated  shunt YBISTBC 2/8  Patient is arousable and responsive but remains sedated  She underwent a procedure for reduction of obesity at [facility]   Unfortunately there was leakage from one of the anastomoses and the patient developed peritonitis  She has a indwelling ventriculo peritoneal shunt for pseudotumor cerebri  After arriving at [facility] the system was externalized at the clavicle  Ophthalmology was consulted and the retina and discs are noted to be flat without any palpable edema  Our plan will be to remove the entire system and replaced at only if absolutely necessary    SURGERY DATE: 2/5/2018     Surgeon(s) and Role:     * Brittaney Cruz MD - Primary     Preop Diagnosis:   (ventriculoperitoneal) shunt status [Z98 2]     Post-Op Diagnosis Codes:     *  (ventriculoperitoneal) shunt status [Z98 2]     Procedure(s) (LRB):  Removal of  shunt (Right)    Discharge Plan: cm following

## 2018-02-05 NOTE — PROGRESS NOTES
Progress Note - Anesthesia Acute Pain Management    Erica Dumas 36 y o  female MRN: [id number]  Unit/Bed#: GEENA [facility]TE[facility] Encounter: [id number]      SURGERY DATE: 2/5/2018  Post-Op Diagnosis Codes:     *  (ventriculoperitoneal) shunt status [Z98 2]    Assessment:   36 y o  female going to OR today for  Removal of  shunt  Pain is controlled with ketamine infusion and prn dilaudid  Principal Problem:    Gastric leak  Active Problems:    Acute peritonitis    Idiopathic intracranial hypertension    S/P  shunt     (ventriculoperitoneal) shunt status    Murmur, cardiac    Refusal of blood product    Gastroesophageal reflux disease    Choroidal nevus, right eye      Plan:   1  Will reorder the ketamine at 0 2mg/kg/hr APS to manage  Should continue ketamine infusion until patient is able to take PO medications and out of the operative period or having side effects  APS will continue to follow; please [facility]  [phone] with any questions    Pain Course:    24 hour history:  Patient states that her pain is tolerable on the ketamine and prn dilaudid    No other complaints today    Meds/Allergies     Inpatient Medications:  Scheduled Meds:   Current Facility-Administered Medications:  [MAR Hold] acetaminophen 325 mg Rectal Q4H PRN Bethany August MD    Adult TPN (CUSTOM BASE/CUSTOM ELECTROLYTE)  Intravenous Continuous Vineet Decker DO Last Rate: 69 6 mL/hr at 02/04/18 2228   Adult TPN (CUSTOM BASE/CUSTOM ELECTROLYTE)  Intravenous Continuous Chuck Pierce PA-C    [MAR Hold] ampicillin-sulbactam 3 g Intravenous Q6H Denise Esparza MD Last Rate: 3 g (02/05/18 1036)   [MAR Hold] bisacodyl 10 mg Rectal Daily PRN Bethany August MD    [facility] Hold] fluconazole 400 mg Intravenous Q24H ABNER Emanuel Last Rate: Stopped (02/04/18 1701)   heparin (porcine) 5,000 Units Subcutaneous [facility] Hitesh Gu MD    [facility] Hold] HYDROmorphone 0 5 mg Intravenous Q3H PRN Muna Hunter PA-C    [facility] Hold] HYDROmorphone 1 mg Intravenous Q3H PRN Roz Cruz MD    [facility] Hold] insulin lispro 1-5 Units [facility] AUREL[facility]    [MAR Hold] iohexol 50 mL Oral 90 min pre-procedure Marci Mclean MD    ketamine 0 2 mg/kg/hr Intravenous Continuous Edwin Mann MD    [facility] Hold] lidocaine 2 patch Transdermal Daily Mo Jaimes PA-C    [facility] Hold] metoprolol 5 mg Intravenous Q6H Gen ClinchAURELIO    [MAR Hold] mirtazapine 15 mg Oral HS Gen ClinchAURELIO    [MAR Hold] pantoprazole 40 mg Intravenous Q24H [facility] ABNER Tse    potassium chloride 40 mEq Intravenous Once Gen ClinchAURELIO Last Rate: 40 mEq (02/05/18 0822)     Continuous Infusions:   Adult TPN (CUSTOM BASE/CUSTOM ELECTROLYTE)  Last Rate: 69 6 mL/hr at 02/04/18 2228   Adult TPN (CUSTOM BASE/CUSTOM ELECTROLYTE)     ketamine 0 2 mg/kg/hr      PRN Meds:    [MAR Hold] acetaminophen 325 mg Q4H PRN   [MAR Hold] bisacodyl 10 mg Daily PRN   [MAR Hold] HYDROmorphone 0 5 mg Q3H PRN   [MAR Hold] HYDROmorphone 1 mg Q3H PRN         Allergies   Allergen Reactions    Benadryl [Diphenhydramine] Swelling    Phenergan [Promethazine] Hives       Objective     Physical Exam: /83   Pulse 81   Temp 98 9 °F (37 2 °C)   Resp (!) 25   Ht 5' 4" (1 626 m)   Wt 70 7 kg (155 lb 13 8 oz)   LMP  (LMP Unknown)   SpO2 96%   Breastfeeding?  No   BMI 26 75 kg/m²   Gen: NAD  Skin: Warm, Dry   HEENT:  PERRLA EOMI  Pul: non labored resp effort  Ext:  No cyanosis or edema  Neuro: AAOx3; CN II-XII grossly intact; 5/5 BLUE, 5/5 BLLE; Sensation intact grossly   Psych:  flat    SIGNATURE: Edwin Mann MD  DATE: February 5, 2018  TIME: 10:54 AM

## 2018-02-05 NOTE — MEDICAL STUDENT
Progress Note - Critical Care   Mars Mortimer 36 y o  female MRN: [id number]  Unit/Bed#: ICU 10 Encounter: [id number]    HPI/24hr events: Pt s/p CT abd and pelvis yesterday, which was negative for contrast leak into the space around the GE junction  Neuro:   1  Day 6 S/p  shunt externalization   -per neurosurgery:    -Tentative plan for removal of shunt in OR on   Tube feeds stopped last night at midnight  Neuro exam stable since   clamping shunt on  and negative papilledema noted by Optho  -optho consult: retinal nevus found, outpt f/u 6-12 mo   2  Analgesia/sedation: acute pain following   -continuous: ketamine @  2 mg/kg/hr   -scheduled: licoderm patches x 2   -prn: haldol 2 mg IM q 30 mins, dilaudid IV  5 mg q3 h and 1 mg q3 h, ativan 1 mg IM q 1 h, tylenol rectal suppository 325 mg q4h   -home: remeron 15 mg qhs                      CV: hemodynamically stable, not requiring pressors   1  3/4 systolic murmur:  KACIE: unrevealing  2  Htn: possibly secondary to pain, no meds at home, continue to monitor   2  Maintain MAP > 65   3  Access:   -PICC line Right Basilic for 15 days                  Lun  CT abd/pelvis : decreased size of bilateral pleural effusions with persistent atelectasis   -: bilateral chest tubes placed by IR: water seal with no air leak,  mL serous drainage, LCT 60 mL serous drainage : d/c left chest tube today per surgery; pleural fluid cxs: no growth to date    -encourage incentive spirometry  -pulmonary toilet                 GI:  1   Acute peritonitis secondary to GE junction leakage, s/p stent placement   -CT chest abd pelvis : no apparent leak of contrast at the GE junction and improvement in abdominal collections   -5 JPs: minimal drainage : continue drainage per surgery   -trickle feeds started on 2/3 and were tolerated well : stopped last night at midnight in preparation for OR today   -GERD: Protonix 40 mg IV q 24 h   -bowel regimen: dulcolax suppository 10 mg daily                 FEN:  1  Electrolytes: Replete electrolytes as needed (goal: K>4, Mag > 2 and Phos > 3) :   2  Nutrition:   -TPN   -trickle feeds started on 2/3 and tolerated well and d/ida @ midnight in preparation for OR today: restart upon return from OR   -Jevity 1 2 10 mL q 4 hrs with goal of 59 mL/hr   -wean TPN as tolerated                 :   -urine output and BUN/Cr within nrml limits, continue trending   -strict I and O                ID: WBC: 26 68 from  47 yesterday, remains afebrile   1  Acute peritonitis :  -continue current abx regimen per ID: unasyn day 10; fluconazole day 6  -Cultures: operative cx from [facility] grew enterococcus, here: enterococcus and yeast;  IR drainage: enterococcus and yeast   2  Possible infected  shunt  -will likely tx with IV abx x 2-3 weeks                  Heme:   -Hgb stable    -blood management program    -subQ heparin                 Endo:  -glucose remains mildly elevated continue insulin sliding scale algorithm 2                             Msk/Skin:  -OOB to chair  -PT/OT                 Disposition: continue ICU care      Consultants:General surgery, neurosurgery, ID      VTE PPx:        Pharmacologic: Sub-q heparin       Mechanical: sequential compression device       Vitals:    18 0404 18 0504 18 0604 18 0704   BP: 150/71 159/77 155/91 155/85   Pulse: 98 98 94 96   Resp: (!) 23 18 20 (!) 25   Temp:       TempSrc:       SpO2: 93% 95% 96% 96%   Weight:       Height:         Arterial Line BP: 166/82  Arterial Line MAP (mmHg): 112 mmHg    Temperature:   Temp (24hrs), Av °F (37 2 °C), Min:98 3 °F (36 8 °C), Max:99 8 °F (37 7 °C)    Current: Temperature: 99 8 °F (37 7 °C)    Weights:   IBW: 54 7 kg    Body mass index is 26 75 kg/m²    Weight (last 2 days)     None          Hemodynamic Monitoring:  Non-Invasive/Invasive Ventilation Settings:  Respiratory    Lab Data (Last 4 hours)    None O2/Vent Data (Last 4 hours)    None              No results found for: PHART, ICW5CSC, PO2ART, WCX7OCE, Y3EDJBWP, BEART, SOURCE      Intake and Outputs:  I/O       01/23 0701 - 01/24 0700 01/24 0701 - 01/25 0700    I V  (mL/kg)  1135 (16)    IV Piggyback  100    Total Intake(mL/kg)  1235 (17 4)    Urine (mL/kg/hr)  900    Drains  760    Total Output   1660    Net   -425                Nutrition:        Diet Orders            Start     Ordered    02/05/18 0001  Diet NPO; Sips with meds  Diet effective midnight     Question Answer Comment   Diet Type NPO    NPO Except:  Sips with meds        02/04/18 2331            Labs:     Results from last 7 days  Lab Units 02/05/18 0524 02/04/18 0535 02/03/18  0438   WBC Thousand/uL 26 68* 31 47* 25 55*   HEMOGLOBIN g/dL 9 0* 9 5* 9 0*   HEMATOCRIT % 28 0* 29 7* 27 9*   PLATELETS Thousands/uL 640* 731* 648*   NEUTROS PCT % 83*  --   --    MONOS PCT % 7  --   --    MONO PCT MAN %  --  4 3*        Results from last 7 days  Lab Units 02/05/18 0524 02/04/18 0535 02/03/18  0438 02/02/18  0415   SODIUM mmol/L 135* 136 139 139   POTASSIUM mmol/L 3 6 3 9 4 1 3 6   CHLORIDE mmol/L 97* 97* 100 101   CO2 mmol/L 34* 35* 35* 35*   BUN mg/dL 17 18 19 18   CREATININE mg/dL 0 28* 0 30* 0 27* 0 27*   CALCIUM mg/dL 8 5 8 7 8 6 9 0   TOTAL PROTEIN g/dL  --   --   --  4 9*   BILIRUBIN TOTAL mg/dL  --   --   --  0 38   ALK PHOS U/L  --   --   --  104   ALT U/L  --   --   --  15   AST U/L  --   --   --  15   GLUCOSE RANDOM mg/dL 137 144* 139 159*       Results from last 7 days  Lab Units 02/05/18 0524 02/04/18 0535 02/03/18  0438   MAGNESIUM mg/dL 2 0 2 0 1 9       Results from last 7 days  Lab Units 02/04/18 0535 02/03/18  0438 02/02/18  0415   PHOSPHORUS mg/dL 3 6 3 3 2 8        Results from last 7 days  Lab Units 02/04/18  1456   INR  1 26*   PTT seconds 37*         No results found for: TROPONINI      Micro:  Lab Results   Component Value Date    WOUNDCULT 2+ Growth of Enterococcus faecalis (A) 01/25/2018       Allergies:    Allergies   Allergen Reactions    Benadryl [Diphenhydramine] Swelling    Phenergan [Promethazine] Hives       Medications:   Scheduled Meds:    Current Facility-Administered Medications:  acetaminophen 325 mg Rectal Q4H PRN Bethany August MD    Adult TPN (CUSTOM BASE/CUSTOM ELECTROLYTE)  Intravenous Continuous Vineet Decker DO Last Rate: 69 6 mL/hr at 02/04/18 2228   Adult TPN (CUSTOM BASE/CUSTOM ELECTROLYTE)  Intravenous Continuous Izabela Mcconnell PA-C    ampicillin-sulbactam 3 g Intravenous Q6H Denise Esparza MD Last Rate: 3 g (02/05/18 0333)   bisacodyl 10 mg Rectal Daily PRN Bethany August MD    fluconazole 400 mg Intravenous Q24H ABNER Emanuel Last Rate: Stopped (02/04/18 1701)   glycopyrrolate 0 2 mg Intravenous Q4H PRN Hugo Calvin MD    haloperidol lactate 2 mg Intramuscular Q30 Min PRN Hugo Calvin MD    heparin (porcine) 5,000 Units Subcutaneous [facility] Hitesh Gu MD    HYDROmorphone 0 5 mg Intravenous Q3H PRN Muna Hunter PA-C    HYDROmorphone 1 mg Intravenous Q3H PRN Kristine Perez MD    insulin lispro 1-5 Units Subcutaneous Q6H [address] Izabela Mcconnell PA-C    iohexol 50 mL Oral 90 min pre-procedure Jaxon Vaca MD    ketamine 0 2 mg/kg/hr Intravenous Continuous Hugo Calvin MD Last Rate: 0 2 mg/kg/hr (02/04/18 1446)   lidocaine 2 patch Transdermal Daily Braden Mccracken PA-C    LORazepam 1 mg Intramuscular Q1H PRN Hugo Calvin MD    mirtazapine 15 mg Oral HS Nayla JUAN Mcconnell PA-C    pantoprazole 40 mg Intravenous Q24H [address] ABNER Tse    potassium chloride 40 mEq Intravenous Once Chuck Pierce PA-C      Continuous Infusions:    Adult TPN (CUSTOM BASE/CUSTOM ELECTROLYTE)  Last Rate: 69 6 mL/hr at 02/04/18 2228   Adult TPN (CUSTOM BASE/CUSTOM ELECTROLYTE)     ketamine 0 2 mg/kg/hr Last Rate: 0 2 mg/kg/hr (02/04/18 1446)     PRN Meds:    acetaminophen 325 mg Q4H PRN   bisacodyl 10 mg Daily PRN   glycopyrrolate 0 2 mg Q4H PRN   haloperidol lactate 2 mg Q30 Min PRN   HYDROmorphone 0 5 mg Q3H PRN   HYDROmorphone 1 mg Q3H PRN   LORazepam 1 mg Q1H PRN       Invasive lines and devices: Invasive Devices     Peripherally Inserted Central Catheter Line            PICC Line 47/35/31 Right Basilic 15 days          Drain            Closed/Suction Drain Left LLQ Bulb 19 Fr  10 days    Closed/Suction Drain Left LUQ Bulb 19 Fr  10 days    Closed/Suction Drain Right RLQ Bulb 19 Fr  10 days    Closed/Suction Drain Right RUQ Accordion 19 Fr  10 days    Ventriculostomy/Subdural Ventricular drainage catheter Right 10 days    Chest Tube 1 Right Pleural 10 Fr  4 days    Chest Tube 2 Left Pleural 10 Fr  4 days    Closed/Suction Drain Left Back Bulb 12 Fr  4 days    NG/OG/Enteral Tube Enteral Feeding Tube Right nares 4 days                      Code Status: Level 1 - Full Code     Portions of the record may have been created with voice recognition software  Occasional wrong word or "sound a like" substitutions may have occurred due to the inherent limitations of voice recognition software  Read the chart carefully and recognize, using context, where substitutions have occurred       Mariano Ahumada

## 2018-02-05 NOTE — ANESTHESIA POSTPROCEDURE EVALUATION
Post-Op Assessment Note      CV Status:  Stable    Mental Status:  Alert and awake    Hydration Status:  Euvolemic    PONV Controlled:  Controlled    Airway Patency:  Patent  Airway: intubated    Post Op Vitals Reviewed: Yes          Staff: CRNA, Anesthesiologist           BP      Temp      Pulse     Resp      SpO2

## 2018-02-05 NOTE — PROGRESS NOTES
Progress Note - Critical Care   Franklyn Salinas 36 y o  female MRN: [id number]  Unit/Bed#: ICU 10 Encounter: [id number]    Assessment: 37 y/o female with complicated PMHx with hydrocephalus with  shunt, Hx Prerna en y, and recent hernia repair who presents s/p ex-lap x2 and externalization of  shunt  Found to have further anastamotic leak on CT scan unamenable to further stenting  IR for drainage of abdominal collection, bilateral thoracentesis with placement of B/L CT and keofed placement  Repeat CT with resolved collection, jejunal tube feeds started     Principal Problem:    Gastric leak  Active Problems:    Acute peritonitis    Idiopathic intracranial hypertension    S/P  shunt     (ventriculoperitoneal) shunt status    Murmur, cardiac    Refusal of blood product    Gastroesophageal reflux disease    Choroidal nevus, right eye  Resolved Problems:    Peritonitis    Plan:   · Neuro: GCS 15 AAOx4  · Analgesia: ketamine drip 0 2 and PRN dilaudid per APS recs   · Sedation: as above  · Anxiety: ODT remeron, PRN haldol/ativan    · VPS 2/2 pseudotumor cerebri externalized 1/25, to OR with Nsx to remove today   · CV:   · Systolic murmur with radiation to the apex, TTE unrevealing  · Hypertension: takes no medications at home, possibly 2/2 pain, continue to monitor and assess pain   · Lung:  · B/L Pleural effusions s/p CT placement: B/L CT to water seal, no air leak   Continue until cultures definitively negative, no growth to date    · GI:   · GERD: Protonix   · GE junction leak s/p stenting: Jejunal feeds tolerated, will hold TF for OR today and resume upon return   · RLQ: 10cc  · LUQ: 10cc  · LLQ: 10cc  · RUQ: 10cc  · Splenic: 60cc   · FEN:   · Fluids: No maintenance   · Electrolytes - Monitor/trend - replete based on deficiencies   · Nutrition: Will reorder TPN, resume trickle tube feeds upon return from OR   · :   · UOP adequate, Cr normal   · ID:   · Splenic drain cultures: enterococcus and yeast, continue Unasyn day 10 and Fluconazole day 6 per ID recs   · Leukocytosis down-trending 26 6 from 31 47, afebrile   · Heme:   · Hgb stable, blood management program  · DVT PPX: SQH, on hold today for OR   · Endo:   · SSI algorithm 2  · Msk/Skin:   · OOB to chair  · PT/OT  · Disposition: ICU level care    ______________________________________________________________________    HPI/24hr events: Stable overnight, tolerating room air  Will go to OR today with neurosurgery     Lines  Right Basillic PICC  RUQ/RLQ/LLQ/LUQ/Perisplenic drains   B/L Chest tubes     Infusions  TPN   Ketamine   ______________________________________________________________________  Physical Exam:  Curran Agitation Sedation Scale (RASS): Drowsy  Physical Exam   Constitutional: She is oriented to person, place, and time  She appears well-developed and well-nourished  HENT:   Head: Normocephalic and atraumatic  Coralee Curls in place     Eyes: EOM are normal  Pupils are equal, round, and reactive to light  No scleral icterus  Neck: Normal range of motion  Neck supple  No JVD present  Cardiovascular: Normal rate and regular rhythm  Murmur (holosystolic murmur) heard  Pulmonary/Chest: Effort normal and breath sounds normal  No respiratory distress  Abdominal: Soft  She exhibits no distension  There is tenderness (diffusely)  5 abdominal ADENIKE drains in place     Musculoskeletal: Normal range of motion  She exhibits edema (trace)  Neurological: She is alert and oriented to person, place, and time  Skin: Skin is warm and dry   No erythema      ______________________________________________________________________  Temperature:   Temp (24hrs), Av °F (37 2 °C), Min:98 3 °F (36 8 °C), Max:99 8 °F (37 7 °C)    Current Temperature: 99 8 °F (37 7 °C)    Vitals:    18 0404 18 0504 18 0604 18 0704   BP: 150/71 159/77 155/91 155/85   Pulse: 98 98 94 96   Resp: (!) 23 18 20 (!) 25   Temp:       TempSrc:       SpO2: 93% 95% 96% 96% Weight:       Height:         Arterial Line BP: 166/82       Weights:   IBW: 54 7 kg    Body mass index is 26 75 kg/m²  Weight (last 2 days)     None        Height: 5' 4" (162 6 cm)  Hemodynamic Monitoring:  PAP:         Ventilator Settings:  Room air    No results found for: PHART, HOD6IAC, PO2ART, HVL0TZO, H3XMXDZX, BEART, SOURCE    Intake and Outputs:  I/O       02/03 0701 - 02/04 0700 02/04 0701 - 02/05 0700 02/05 0701 - 02/06 0700    I V  (mL/kg) 366 4 (5 2) 338 7 (4 8) 15 5 (0 2)    NG/GT 0 300     IV Piggyback 855 500     TPN 1808 4 1531 2 215 8    Feedings 180 250     Total Intake(mL/kg) 3209 8 (45 4) 2919 9 (41 3) 231 3 (3 3)    Urine (mL/kg/hr) 3525 (2 1) 2465 (1 5) 0 (0)    Emesis/NG output 0 (0)      Drains 102 (0 1) 95 (0 1) 0 (0)    Chest Tube 790 (0 5) 160 (0 1)     Total Output 4417 2720 0    Net -1207 2 +199 9 +231 3           Unmeasured Urine Occurrence 1 x 1 x 1 x        UOP: 1 1cc/kg/hour   Nutrition:        Diet Orders            Start     Ordered    02/05/18 0001  Diet NPO; Sips with meds  Diet effective midnight     Question Answer Comment   Diet Type NPO    NPO Except:  Sips with meds        02/04/18 2331          Labs:     Results from last 7 days  Lab Units 02/05/18 0524 02/04/18  0535 02/03/18  0438   WBC Thousand/uL 26 68* 31 47* 25 55*   HEMOGLOBIN g/dL 9 0* 9 5* 9 0*   HEMATOCRIT % 28 0* 29 7* 27 9*   PLATELETS Thousands/uL 640* 731* 648*   NEUTROS PCT % 83*  --   --    MONOS PCT % 7  --   --    MONO PCT MAN %  --  4 3*      Results from last 7 days  Lab Units 02/05/18 0524 02/04/18  0535 02/03/18  0438 02/02/18  0415   SODIUM mmol/L 135* 136 139 139   POTASSIUM mmol/L 3 6 3 9 4 1 3 6   CHLORIDE mmol/L 97* 97* 100 101   CO2 mmol/L 34* 35* 35* 35*   BUN mg/dL 17 18 19 18   CREATININE mg/dL 0 28* 0 30* 0 27* 0 27*   CALCIUM mg/dL 8 5 8 7 8 6 9 0   TOTAL PROTEIN g/dL  --   --   --  4 9*   BILIRUBIN TOTAL mg/dL  --   --   --  0 38   ALK PHOS U/L  --   --   --  104   ALT U/L  --   -- --  15   AST U/L  --   --   --  15   GLUCOSE RANDOM mg/dL 137 144* 139 159*       Results from last 7 days  Lab Units 02/05/18  0524 02/04/18  0535 02/03/18  0438   MAGNESIUM mg/dL 2 0 2 0 1 9       Results from last 7 days  Lab Units 02/04/18  0535 02/03/18  0438 02/02/18  0415   PHOSPHORUS mg/dL 3 6 3 3 2 8        Results from last 7 days  Lab Units 02/04/18  1456   INR  1 26*   PTT seconds 37*         No results found for: TROPONINI  Imaging:   CT Chest/abd/pelvis: 1  Diminished size of bilateral pleural effusion status post chest tube placement  The right-sided chest tube is intrafissural terminating anterior to the middle lobe  2   Persistent bibasilar compressive atelectasis  3   The previously described extraluminal collection arising from the distal esophageal stent injecting into the gastrohepatic space is now predominantly air-filled with no progression of extravasation of enteric contrast into the collection  4   Markedly improved perisplenic collection status post percutaneous drainage catheter placement with trace enteric contrast in several foci of air noted in the collection  The right subdiaphragmatic/perihepatic collection continues to diminish in size   as well  5   Diminished size of enhancing collections in the left paracolic gutter and deep in the left pelvis with stable ascites in the abdomen and pelvis  Severe anasarca unchanged  6   Stable dilatation and wall thickening of the small bowel with enteric contrast seen to the splenic flexure  No gross obstruction or pneumoperitoneum  I have personally reviewed pertinent reports     and I have personally reviewed pertinent films in PACS  EKG: NSR   Micro:  Blood Culture: No results found for: BLOODCX  Urine Culture: No results found for: URINECX  Sputum Culture: No components found for: SPUTUMCX  Wound Culure:   Lab Results   Component Value Date    WOUNDCULT 2+ Growth of Enterococcus faecalis (A) 01/25/2018       Lab Results   Component Value Date    WOUNDCULT 2+ Growth of Enterococcus faecalis (A) 01/25/2018     Allergies:    Allergies   Allergen Reactions    Benadryl [Diphenhydramine] Swelling    Phenergan [Promethazine] Hives     Medications:   Scheduled Meds:  Current Facility-Administered Medications:  acetaminophen 325 mg Rectal Q4H PRN Chencho Brewer MD    Adult TPN (CUSTOM BASE/CUSTOM ELECTROLYTE)  Intravenous Continuous Vineet Decker DO Last Rate: 69 6 mL/hr at 02/04/18 2228   ampicillin-sulbactam 3 g Intravenous Q6H Pastor Danita MD Last Rate: 3 g (02/05/18 0333)   bisacodyl 10 mg Rectal Daily PRN Chencho Brewer MD    fluconazole 400 mg Intravenous Q24H ABNER Aquino Last Rate: Stopped (02/04/18 1701)   glycopyrrolate 0 2 mg Intravenous Q4H PRN Marquez Hawley MD    haloperidol lactate 2 mg Intramuscular Q30 Min PRN Marquez Hawley MD    heparin (porcine) 5,000 Units Subcutaneous [facility] Patel Torres MD    HYDROmorphone 0 5 mg Intravenous Q3H PRN Cindy Amaral PA-C    HYDROmorphone 1 mg Intravenous Q3H PRN Roz Meraz MD    insulin lispro 1-5 Units Subcutaneous Q6H [facility] Tarsha Mcconnell PA-C    iohexol 50 mL Oral 90 min pre-procedure Delfina Fox MD    ketamine 0 2 mg/kg/hr Intravenous Continuous Marquez Hawley MD Last Rate: 0 2 mg/kg/hr (02/04/18 1446)   lidocaine 2 patch Transdermal Daily Sohan Jaramillo PA-C    LORazepam 1 mg Intramuscular Q1H PRN Marquez Hawley MD    mirtazapine 15 mg Oral HS Nayla Mcconnell PA-C    pantoprazole 40 mg Intravenous Q24H [facility] ABNER Tse      Continuous Infusions:  Adult TPN (CUSTOM BASE/CUSTOM ELECTROLYTE)  Last Rate: 69 6 mL/hr at 02/04/18 2228   ketamine 0 2 mg/kg/hr Last Rate: 0 2 mg/kg/hr (02/04/18 1446)     PRN Meds:    acetaminophen 325 mg Q4H PRN   bisacodyl 10 mg Daily PRN   glycopyrrolate 0 2 mg Q4H PRN   haloperidol lactate 2 mg Q30 Min PRN   HYDROmorphone 0 5 mg Q3H PRN   HYDROmorphone 1 mg Q3H PRN   LORazepam 1 mg Q1H PRN     VTE Pharmacologic Prophylaxis: Heparin  VTE Mechanical Prophylaxis: sequential compression device  Invasive lines and devices: Invasive Devices     Peripherally Inserted Central Catheter Line            PICC Line 91/39/25 Right Basilic 15 days          Drain            Closed/Suction Drain Left LLQ Bulb 19 Fr  10 days    Closed/Suction Drain Left LUQ Bulb 19 Fr  10 days    Closed/Suction Drain Right RLQ Bulb 19 Fr  10 days    Closed/Suction Drain Right RUQ Accordion 19 Fr  10 days    Ventriculostomy/Subdural Ventricular drainage catheter Right 10 days    Chest Tube 1 Right Pleural 10 Fr  4 days    Chest Tube 2 Left Pleural 10 Fr  4 days    Closed/Suction Drain Left Back Bulb 12 Fr  4 days    NG/OG/Enteral Tube Enteral Feeding Tube Right nares 4 days                   Code Status: Level 1 - Full Code    Portions of the record may have been created with voice recognition software  Occasional wrong word or "sound a like" substitutions may have occurred due to the inherent limitations of voice recognition software  Read the chart carefully and recognize, using context, where substitutions have occurred      Brenda Marrero PA-C

## 2018-02-05 NOTE — OP NOTE
OPERATIVE REPORT  PATIENT NAME: Dede Shanks    :  1975  MRN: [id number]  Pt Location: BE OR ROOM 09    SURGERY DATE: 2018    Surgeon(s) and Role:     * Latha Marrero MD - Primary    Preop Diagnosis:   (ventriculoperitoneal) shunt status [Z98 2]    Post-Op Diagnosis Codes:     *  (ventriculoperitoneal) shunt status [Z98 2]    Procedure(s) (LRB):  Removal of  shunt (Right)    Specimen(s):  ID Type Source Tests Collected by Time Destination   1 :   v-p shunt catheter tip Tissue Other ANAEROBIC CULTURE AND GRAM STAIN, FUNGAL CULTURE, CULTURE, TISSUE AND GRAM STAIN, TISSUE EXAM, AFB CULTURE WITH STAIN Latha Marrero MD 2018 12:15 PM        Estimated Blood Loss:   Minimal    Drains:  Chest Tube 1 Right Pleural 10 Fr  (Active)   Function To water seal 2018 10:00 AM   Chest Tube Air Leak No 2018 10:00 AM   Patency Intervention Tip/tilt 2018 10:00 AM   Drainage Description Serous 2018 10:00 AM   Dressing Status Clean;Dry; Intact 2018 10:00 AM   Site Assessment Clean;Dry; Intact 2018 10:00 AM   Surrounding Skin Unable to view 2018 10:00 AM   Output (mL) 25 mL 2018 10:00 AM   Number of days: 5       Chest Tube 2 Left Pleural 10 Fr  (Active)   Function To water seal 2018 10:00 AM   Chest Tube Air Leak No 2018 10:00 AM   Patency Intervention Tip/tilt 2018 10:00 AM   Drainage Description Serous 2018 10:00 AM   Dressing Status Intact 2018 10:00 AM   Site Assessment Clean;Dry; Intact 2018 10:00 AM   Surrounding Skin Unable to view 2018 10:00 AM   Output (mL) 25 mL 2018 10:00 AM   Number of days: 5       Closed/Suction Drain Right RUQ Accordion 19 Fr  (Active)   Site Description Healing 2018 10:00 AM   Dressing Status Clean;Dry; Intact 2018 10:00 AM   Drainage Appearance Serosanguineous 2018 10:00 AM   Status To bulb suction 2018 10:00 AM   Output (mL) 0 mL 2018 10:00 AM   Number of days: 11       Closed/Suction Drain Right RLQ Bulb 19 Fr  (Active)   Site Description Healing 2/5/2018 10:00 AM   Dressing Status Clean;Dry; Intact 2/5/2018 10:00 AM   Drainage Appearance Tan; Thick;Pink tinged 2/5/2018 10:00 AM   Status To bulb suction 2/5/2018 10:00 AM   Output (mL) 0 mL 2/5/2018 10:00 AM   Number of days: 11       Closed/Suction Drain Left LUQ Bulb 19 Fr  (Active)   Site Description Healing 2/5/2018 10:00 AM   Dressing Status Clean;Dry; Intact 2/5/2018 10:00 AM   Drainage Appearance Serosanguineous 2/5/2018 10:00 AM   Status To bulb suction 2/5/2018 10:00 AM   Output (mL) 0 mL 2/5/2018 10:00 AM   Number of days: 11       Closed/Suction Drain Left LLQ Bulb 19 Fr  (Active)   Site Description Healing 2/5/2018 10:00 AM   Dressing Status Clean;Dry; Intact 2/5/2018 10:00 AM   Drainage Appearance Serous 2/5/2018 10:00 AM   Status To bulb suction 2/5/2018 10:00 AM   Output (mL) 5 mL 2/5/2018  4:01 AM   Number of days: 11       Closed/Suction Drain Left Back Bulb 12 Fr  (Active)   Site Description Healing 2/5/2018 10:00 AM   Dressing Status Clean;Dry; Intact 2/5/2018 10:00 AM   Drainage Appearance Thick;Tan;Serous 2/5/2018 10:00 AM   Status To bulb suction 2/5/2018 10:00 AM   Output (mL) 0 mL 2/5/2018 10:00 AM   Number of days: 5       NG/OG/Enteral Tube Enteral Feeding Tube Right nares (Active)   Placement Reverification X-ray 2/5/2018  4:01 AM   Site Assessment Clean;Dry; Intact 2/5/2018 10:00 AM   Enteral feeding tube secured at (cm) 95 Cm 2/4/2018  4:00 PM   Enteral feeding tube interventions Flushed 2/4/2018  4:00 PM   Status Clamped 2/5/2018 10:00 AM   Drainage Appearance None 2/4/2018 12:00 AM   Intake (mL) 300 mL 2/4/2018  1:01 PM   Output (mL) 0 mL 2/3/2018  8:00 AM   Number of days: 5       Anesthesia Type:   General    Operative Indications:   (ventriculoperitoneal) shunt status [Z98 2]    Operative Findings:  No infection or erythema or edema along the shunt catheter tract    Complications:   None    Procedure and Technique:  After adequate general endotracheal anesthesia the patient  Was kept on her bed  The hair was clipped in the region over the valve  This area was prepped with Betadine soap then DuraPrep  Double layer drapes were placed in normal fashion a  Betadine impregnated sticky drape was placed over these  A time-out was called and all parameters a time-out were followed  the skin over the valve system was injected with 1% lidocaine with 1 100,000 epinephrine  A 15  Blade was used to incise the skin  The Bovie was utilized to divide the tissues through the galea  The valve was identified and removed without difficulty  Both the ventricular and peritoneal catheters were completely removed  Then the peritoneal catheter exiting from the clavicle have been previous the pulled taut and then removed after the exit site was prepped  This was done in an effort to reduce the risk of pulling the infection up into the operative site  The catheter tip was sent to the laboratory as specimen  The incision was closed with interrupted inverted 3 0 Vicryl suture and a running 4 0 Monocryl history Crohn was placed over this     I was present for the entire procedure    Patient Disposition:  PACU     SIGNATURE: Parvez Whitfield MD  DATE: February 5, 2018  TIME: 1:31 PM

## 2018-02-05 NOTE — PHYSICAL THERAPY NOTE
Physical Therapy Cancellation Note    PT TREATMENT ATTEMPTED  PATIENT IS OFF FLOOR IN OR AT THIS TIME  PT WILL CONTINUE TO FOLLOW ON CASELOAD AND INITIATE SERVICES AS APPROPRIATE      Deepti Loyola PT

## 2018-02-05 NOTE — PROGRESS NOTES
Progress Note - Infectious Disease   Lizbeth Jaguar 36 y o  female MRN: [id number]  Unit/Bed#: ICU 10 Encounter: [id number]      Impression/Recommendations:  1   Intra-abdominal/pelvic abscesses   Patient is status post repeat abdominal washout   She has multiple drains in place   She is status post placement of drainage in a new perisplenic collection   She is clinically well and systemically stable  Operative culture from [facility], growing only ampicillin susceptible Enterococcus   Operative culture here also growing  only ampicillin susceptible Enterococcus   Latest culture also grew yeast, in addition to Enterococcus  Repeat CT from yesterday showed improving collection  Continue with IV Unasyn/fluconazole  Follow-up on culture of new perisplenic collection      2   Possible infected  shunt   Given presence of tip of  shunt in peritoneal space, there is high probability of  shunt infection   Fortunately, patient has no symptoms concerning for meningitis   She has neurological deficits   She is status post tapping of  shunt at [facility]   CSF culture there had no growth but patient had prior antibiotic  Gavin Martines will go ahead and treat her for possible/presumptive  shunt infection   Patient is now status post  shunt externalization   CSF culture here also negative   Plan for total resection of  shunt later today noted   With removal shunt, antibiotic course can be decreased to 2-3 weeks     Antibiotic plan as in above  VPS resection later today noted  Likely treat with IV antibiotic x 2-3 weeks      3   Gastric perforation, status post repair, with persistent leak   She is now status post placement of esophageal stent   Repeat video barium swallow from last week with no further leakage   Unfortunately, repeat abdomen/pelvis CT showed recurrent leak with new perisplenic collection   She is now status post placement of duodenal feeding tube and drainage of the perisplenic collection   However, both Critical Care service and GI service feel that patient should stay on TPN and not use feeding tube for enteral feeding  Repeat CT from yesterday showed no further leak  Monitor for recurrent leak  Patient will most likely need a repeat barium swallow down the line      4   Bilateral pleural effusion, status post chest tube placement  This is most likely sympathetic effusion, secondary to intra-abdominal abscesses  CT management per Critical Care Service  5  Recurrent leukocytosis   WBC was a for the last few days, but decreased again last 24 hours  Repeat CT with improving collections and no new collection  Antibiotic plan as in above  Monitor WBC and temperature      6  Septic shock, on presentation at [facility]   This is secondary to gastric perforation   Patient is now hemodynamically stable   All cultures from [facility] obtained and reviewed, now on chart   All blood cultures were negative   Urine culture negative   CSF culture negative   Operative culture positive for Enterococcus, as in above  Antibiotic plan as in above  Monitor hemodynamics      Discussed with the patient in detail regarding above plan      Antibiotics:  Unasyn  POD # 11  Fluconazole # 7     Subjective:  Patient remains in ICU  Patient's abdominal pain is stable and controlled     No headache  No chest wall pain  She is awake and alert  Temperature low-grade but trending down   No chills  She is tolerating antibiotic well   No nausea, vomiting or diarrhea  Objective:  Vitals:  HR:  [] 96  Resp:  [15-32] 25  BP: (144-168)/(71-91) 155/85  SpO2:  [93 %-98 %] 96 %  Temp (24hrs), Av 1 °F (37 3 °C), Min:98 3 °F (36 8 °C), Max:99 8 °F (37 7 °C)  Current: Temperature: 99 8 °F (37 7 °C)    Physical Exam:     General: Awake, alert, cooperative, no distress  Chest:  VPS drain site clean  No erythema/warmth  No fluctuance  No tenderness     Lungs: Expansion symmetric, no rales, no wheezing, respirations unlabored  Heart[de-identified]  Regular rate and rhythm, S1 and S2 normal, stable murmur  Abdomen: Soft, mildly distended  Incision clean  Stable mild to moderate incisional tenderness  Drains still seropurulent but improving  Extremities: Trace edema  No erythema/warmth  No ulcer  Nontender to palpation  Skin:  No rash  Invasive Devices     Peripherally Inserted Central Catheter Line            PICC Line 90/50/27 Right Basilic 15 days          Drain            Closed/Suction Drain Left LLQ Bulb 19 Fr  10 days    Closed/Suction Drain Left LUQ Bulb 19 Fr  10 days    Closed/Suction Drain Right RLQ Bulb 19 Fr  10 days    Closed/Suction Drain Right RUQ Accordion 19 Fr  10 days    Ventriculostomy/Subdural Ventricular drainage catheter Right 10 days    Chest Tube 1 Right Pleural 10 Fr  5 days    Chest Tube 2 Left Pleural 10 Fr  5 days    Closed/Suction Drain Left Back Bulb 12 Fr  5 days    NG/OG/Enteral Tube Enteral Feeding Tube Right nares 5 days                Labs studies:   I have personally reviewed pertinent labs      Results from last 7 days  Lab Units 02/05/18  0524 02/04/18  0535 02/03/18  0438 02/02/18  0415   SODIUM mmol/L 135* 136 139 139   POTASSIUM mmol/L 3 6 3 9 4 1 3 6   CHLORIDE mmol/L 97* 97* 100 101   CO2 mmol/L 34* 35* 35* 35*   ANION GAP mmol/L 4 4 4 3*   BUN mg/dL 17 18 19 18   CREATININE mg/dL 0 28* 0 30* 0 27* 0 27*   EGFR ml/min/1 73sq m 144 141 146 146   GLUCOSE RANDOM mg/dL 137 144* 139 159*   CALCIUM mg/dL 8 5 8 7 8 6 9 0   AST U/L  --   --   --  15   ALT U/L  --   --   --  15   ALK PHOS U/L  --   --   --  104   TOTAL PROTEIN g/dL  --   --   --  4 9*   BILIRUBIN TOTAL mg/dL  --   --   --  0 38       Results from last 7 days  Lab Units 02/05/18  0524 02/04/18  0535 02/03/18  0438   WBC Thousand/uL 26 68* 31 47* 25 55*   HEMOGLOBIN g/dL 9 0* 9 5* 9 0*   PLATELETS Thousands/uL 640* 731* 648*       Results from last 7 days  Lab Units 01/31/18  0902 01/31/18  0845 01/31/18  0842 01/30/18  1151   GRAM STAIN RESULT  No Polys or Bacteria seen Rare Polys  No bacteria seen 1+ Polys  No bacteria seen No Polys or Bacteria seen   BODY FLUID CULTURE, STERILE  Few Colonies of Enterococcus faecalis*  Few Colonies of Yeast* No growth No growth  --        Imaging Studies:   I have personally reviewed pertinent imaging study reports and images in PACS  Chest/abdomen/pelvis CT reviewed personally  Improved pleural effusion  Stable bibasilar atelectasis  Improved intra-abdominal collections  No new collection  EKG, Pathology, and Other Studies:   I have personally reviewed pertinent reports

## 2018-02-05 NOTE — PROGRESS NOTES
02/05/18    Procedure: Chest tube removal    Right and left small bore pleural pigtail catheters removed in routine fashion without incident  The patient tolerated the procedure well  A dry, sterile dressing was placed  Will check a portable chest x-ray       Arabella Sanderson MD

## 2018-02-05 NOTE — PROGRESS NOTES
Progress Note - General Surgery   Jayla Ro 36 y o  female MRN: [id number]  Unit/Bed#: ICU 10 Encounter: [id number]    Assessment:  36y o -year-old female with gastric perforation s/p hiatal hernia repair at outside hospital, s/p esophageal stent placement, exploratory laparotomy and washout  Has had persistent esophageal/gastric leak on imaging  Had bilateral pleural effusions which were drained  Perisplenic fluid collection was drained  - T max 99 8 last night   R  from 450, waterseal no airleak   L CT 60 fgrom 340 wateseal no airleak   WBC 26 toda from 32 yesterday   -CT yesterday shows decreasing collections     Plan:  Gastric perforation   - NPO for OR today, then resume feeds slowly              - continue drains              - continue TPN              - abx per ID is unasyn / fluconazole from cultures growing enterococcus               - ketamine gtt wean as tolerated    -d/c Left chest tube          Subjective/Objective   Chief Complaint:     Subjective:     Objective:     Blood pressure 155/85, pulse 96, temperature 99 8 °F (37 7 °C), temperature source Oral, resp  rate (!) 25, height 5' 4" (1 626 m), weight 70 7 kg (155 lb 13 8 oz), SpO2 96 %, not currently breastfeeding  ,Body mass index is 26 75 kg/m²  Intake/Output Summary (Last 24 hours) at 02/05/18 0724  Last data filed at 02/05/18 0707   Gross per 24 hour   Intake          3151 13 ml   Output             2720 ml   Net           431 13 ml       Invasive Devices     Peripherally Inserted Central Catheter Line            PICC Line 17/65/51 Right Basilic 15 days          Drain            Closed/Suction Drain Left LLQ Bulb 19 Fr  10 days    Closed/Suction Drain Left LUQ Bulb 19 Fr  10 days    Closed/Suction Drain Right RLQ Bulb 19 Fr  10 days    Closed/Suction Drain Right RUQ Accordion 19 Fr   10 days    Ventriculostomy/Subdural Ventricular drainage catheter Right 10 days    Chest Tube 1 Right Pleural 10 Fr  4 days    Chest Tube 2 Left Pleural 10 Fr  4 days    Closed/Suction Drain Left Back Bulb 12 Fr  4 days    NG/OG/Enteral Tube Enteral Feeding Tube Right nares 4 days                Physical Exam: General: AAOx3  Respiratory: BS b/l  R CT wateseal, outpu serous no AL   L CT wateseal, output serous, no AL   Abdomen: Soft, NT, no rebound/guarding  RUQ/RLQ/LUQ/LLQ drain serosang   Perisplenic drain serous   Heart: RRR, S1s2  Ext: Warm no cyanosis       Lab, Imaging and other studies:  I have personally reviewed pertinent lab results    , CBC:   Lab Results   Component Value Date    WBC 26 68 (H) 02/05/2018    HGB 9 0 (L) 02/05/2018    HCT 28 0 (L) 02/05/2018    MCV 92 02/05/2018     (H) 02/05/2018    MCH 29 6 02/05/2018    MCHC 32 1 02/05/2018    RDW 15 8 (H) 02/05/2018    MPV 8 9 02/05/2018    NRBC 0 02/05/2018   , CMP:   Lab Results   Component Value Date     (L) 02/05/2018    K 3 6 02/05/2018    CL 97 (L) 02/05/2018    CO2 34 (H) 02/05/2018    ANIONGAP 4 02/05/2018    BUN 17 02/05/2018    CREATININE 0 28 (L) 02/05/2018    GLUCOSE 137 02/05/2018    CALCIUM 8 5 02/05/2018    EGFR 144 02/05/2018     VTE Pharmacologic Prophylaxis: Sequential compression device (Venodyne)   VTE Mechanical Prophylaxis: sequential compression device

## 2018-02-06 ENCOUNTER — APPOINTMENT (INPATIENT)
Dept: RADIOLOGY | Facility: HOSPITAL | Age: 43
DRG: 711 | End: 2018-02-06
Attending: SURGERY
Payer: COMMERCIAL

## 2018-02-06 LAB
GLUCOSE SERPL-MCNC: 163 MG/DL (ref 65–140)
GLUCOSE SERPL-MCNC: 172 MG/DL (ref 65–140)
GLUCOSE SERPL-MCNC: 185 MG/DL (ref 65–140)
GLUCOSE SERPL-MCNC: 188 MG/DL (ref 65–140)

## 2018-02-06 PROCEDURE — 97530 THERAPEUTIC ACTIVITIES: CPT

## 2018-02-06 PROCEDURE — 82948 REAGENT STRIP/BLOOD GLUCOSE: CPT

## 2018-02-06 PROCEDURE — 71045 X-RAY EXAM CHEST 1 VIEW: CPT

## 2018-02-06 PROCEDURE — 99232 SBSQ HOSP IP/OBS MODERATE 35: CPT | Performed by: SURGERY

## 2018-02-06 PROCEDURE — 99233 SBSQ HOSP IP/OBS HIGH 50: CPT | Performed by: INTERNAL MEDICINE

## 2018-02-06 PROCEDURE — 97110 THERAPEUTIC EXERCISES: CPT

## 2018-02-06 PROCEDURE — C9113 INJ PANTOPRAZOLE SODIUM, VIA: HCPCS | Performed by: NURSE PRACTITIONER

## 2018-02-06 PROCEDURE — 99291 CRITICAL CARE FIRST HOUR: CPT | Performed by: STUDENT IN AN ORGANIZED HEALTH CARE EDUCATION/TRAINING PROGRAM

## 2018-02-06 RX ORDER — METOPROLOL TARTRATE 5 MG/5ML
10 INJECTION INTRAVENOUS EVERY 6 HOURS
Status: DISCONTINUED | OUTPATIENT
Start: 2018-02-06 | End: 2018-03-09 | Stop reason: HOSPADM

## 2018-02-06 RX ORDER — GABAPENTIN 250 MG/5ML
300 SOLUTION ORAL
Status: DISCONTINUED | OUTPATIENT
Start: 2018-02-06 | End: 2018-03-09 | Stop reason: HOSPADM

## 2018-02-06 RX ORDER — NORTRIPTYLINE HYDROCHLORIDE 10 MG/1
10 CAPSULE ORAL 2 TIMES DAILY
Status: DISCONTINUED | OUTPATIENT
Start: 2018-02-06 | End: 2018-03-09 | Stop reason: HOSPADM

## 2018-02-06 RX ORDER — HEPARIN SODIUM 5000 [USP'U]/ML
5000 INJECTION, SOLUTION INTRAVENOUS; SUBCUTANEOUS EVERY 12 HOURS SCHEDULED
Status: DISCONTINUED | OUTPATIENT
Start: 2018-02-06 | End: 2018-03-09 | Stop reason: HOSPADM

## 2018-02-06 RX ADMIN — HEPARIN SODIUM 5000 UNITS: 5000 INJECTION, SOLUTION INTRAVENOUS; SUBCUTANEOUS at 21:13

## 2018-02-06 RX ADMIN — METOPROLOL TARTRATE 10 MG: 1 INJECTION, SOLUTION INTRAVENOUS at 17:29

## 2018-02-06 RX ADMIN — HYDROMORPHONE HYDROCHLORIDE 1 MG: 1 INJECTION, SOLUTION INTRAMUSCULAR; INTRAVENOUS; SUBCUTANEOUS at 02:00

## 2018-02-06 RX ADMIN — HEPARIN SODIUM 5000 UNITS: 5000 INJECTION, SOLUTION INTRAVENOUS; SUBCUTANEOUS at 09:05

## 2018-02-06 RX ADMIN — METOPROLOL TARTRATE 5 MG: 1 INJECTION, SOLUTION INTRAVENOUS at 09:06

## 2018-02-06 RX ADMIN — FLUCONAZOLE 400 MG: 2 INJECTION INTRAVENOUS at 17:20

## 2018-02-06 RX ADMIN — Medication 3 G: at 09:05

## 2018-02-06 RX ADMIN — Medication 3 G: at 19:48

## 2018-02-06 RX ADMIN — INSULIN LISPRO 1 UNITS: 100 INJECTION, SOLUTION INTRAVENOUS; SUBCUTANEOUS at 12:27

## 2018-02-06 RX ADMIN — NORTRIPTYLINE HYDROCHLORIDE 10 MG: 10 CAPSULE ORAL at 17:20

## 2018-02-06 RX ADMIN — GABAPENTIN 300 MG: 250 SOLUTION ORAL at 22:02

## 2018-02-06 RX ADMIN — THIAMINE HYDROCHLORIDE: 100 INJECTION, SOLUTION INTRAMUSCULAR; INTRAVENOUS at 22:42

## 2018-02-06 RX ADMIN — HYDROMORPHONE HYDROCHLORIDE 0.5 MG: 1 INJECTION, SOLUTION INTRAMUSCULAR; INTRAVENOUS; SUBCUTANEOUS at 04:49

## 2018-02-06 RX ADMIN — MIRTAZAPINE 15 MG: 15 TABLET, ORALLY DISINTEGRATING ORAL at 21:15

## 2018-02-06 RX ADMIN — Medication 3 G: at 04:48

## 2018-02-06 RX ADMIN — PANTOPRAZOLE SODIUM 40 MG: 40 INJECTION, POWDER, FOR SOLUTION INTRAVENOUS at 09:06

## 2018-02-06 RX ADMIN — METOPROLOL TARTRATE 10 MG: 1 INJECTION, SOLUTION INTRAVENOUS at 21:10

## 2018-02-06 RX ADMIN — Medication 0.2 MG/KG/HR: at 21:16

## 2018-02-06 RX ADMIN — METOPROLOL TARTRATE 5 MG: 1 INJECTION, SOLUTION INTRAVENOUS at 04:48

## 2018-02-06 RX ADMIN — LIDOCAINE 1 PATCH: 50 PATCH TOPICAL at 09:05

## 2018-02-06 RX ADMIN — HYDROMORPHONE HYDROCHLORIDE 1 MG: 1 INJECTION, SOLUTION INTRAMUSCULAR; INTRAVENOUS; SUBCUTANEOUS at 08:58

## 2018-02-06 RX ADMIN — HYDROMORPHONE HYDROCHLORIDE 0.5 MG: 1 INJECTION, SOLUTION INTRAMUSCULAR; INTRAVENOUS; SUBCUTANEOUS at 14:10

## 2018-02-06 RX ADMIN — INSULIN LISPRO 1 UNITS: 100 INJECTION, SOLUTION INTRAVENOUS; SUBCUTANEOUS at 06:45

## 2018-02-06 RX ADMIN — INSULIN LISPRO 1 UNITS: 100 INJECTION, SOLUTION INTRAVENOUS; SUBCUTANEOUS at 02:01

## 2018-02-06 RX ADMIN — INSULIN LISPRO 1 UNITS: 100 INJECTION, SOLUTION INTRAVENOUS; SUBCUTANEOUS at 17:32

## 2018-02-06 RX ADMIN — NORTRIPTYLINE HYDROCHLORIDE 10 MG: 10 CAPSULE ORAL at 12:27

## 2018-02-06 RX ADMIN — Medication 0.2 MG/KG/HR: at 04:57

## 2018-02-06 NOTE — PROGRESS NOTES
Progress Note - ICU Transfer to Step down/med  surg  - [facility] 36 y o  female MRN: [id number]    Unit/Bed#: ICU 10 Encounter: [id number]      Code Status: Level 1 - Full Code    Date of ICU admission: 1/24/2018    Reason for ICU adm: Direct admit with peritonitis s/p gastric leak and  shunt not externalized    Active problems:   Patient Active Problem List   Diagnosis    Gastric leak    Acute peritonitis    Idiopathic intracranial hypertension    S/P  shunt     (ventriculoperitoneal) shunt status    Murmur, cardiac    Refusal of blood product    Gastroesophageal reflux disease    Choroidal nevus, right eye       Summary of clinical course:   36 y o  female with a pmh of gastric sleeve in 2016, morbid obesity, IIH with  shunt last revised in 06/2107 who presented as direct admission to the ICU from transfer [facility] for esophageal stent status post upper gastric injury during laparoscopic paraesophageal hernia repair  She presented to ED at an OSH on 01/14 and was found to have peritonitis and septic shock  She had an emergent ex-laparoscopy and was found to have gastric perforation that required repair at that time with multiple drains placed and was placed on broad spectrum antibiotics  She was found to have fluid collection in the pelvis on POD 9  A perc drain was placed and cultured sent  Upper GI series showed leakage around the GE junction and she subsequently transferred to [facility]   She underwent esophageal stenting on 1/24/2018 and subsequent ex-lap and washout  [facility] [place] She was found to have further anastamotic leak on CT scan unamenable to further stenting  She underwent  IR for drainage of abdominal collection, bilateral thoracentesis with placement of B/L chest tubes and keofed placement   shunt was externalized and clamped    She was started on Unasyn and Fluconazole per ID recommendations for Cx growing enterococcus and yeast   Repeat CT showed resolved collection and jejunal tube feeds were started   shunt and bilateral chest tubes removed on 2/5/2018      Recent or scheduled procedures:   None    Outstanding/pending diagnostics:   CXR ordered for tomorrow 6 AM to follow right apical pneumothorax s/p chest tube removal    Hospital discharge planning:   PT/OT  case management

## 2018-02-06 NOTE — PLAN OF CARE
Problem: PHYSICAL THERAPY ADULT  Goal: Performs mobility at highest level of function for planned discharge setting  See evaluation for individualized goals  Treatment/Interventions: Functional transfer training, LE strengthening/ROM, Therapeutic exercise, Endurance training, Bed mobility, Spoke to nursing  Equipment Recommended:  (R/O LEAST RESTRICTIVE AD )       See flowsheet documentation for full assessment, interventions and recommendations  Outcome: Progressing  Prognosis: Good  Problem List: Decreased strength, Decreased range of motion, Decreased endurance, Impaired balance, Decreased mobility, Decreased coordination, Decreased skin integrity, Pain  Assessment: Pt able to perform BM, B rolling and sit<->stand transfers during tx session  Pt needed maxAX2 for BM,maxAX1 for B rolling and modAX2 for sit<->stand transfers from raised surface  Pt able to static stand 20 seconds x2 sit<->stand transfers with B HHA for positioning and repositioning of bed linens and to inc B weight bearing throughout BLE  Pt able to perform and complete BLE ther ex supine in bed premobility AROM with minAx1 for instruction  Pt has flat affect throughout and reports 8/10 pain B ABD and LBP at rest and during mobility  Pt would cont to benefit from skilled inpt PT services to maximize functional independence  Barriers to Discharge: Decreased caregiver support, Inaccessible home environment     Recommendation:  (inpt rhb)     PT - OK to Discharge: (S) Yes (TO STR WHEN MED FREDERIC )    See flowsheet documentation for full assessment

## 2018-02-06 NOTE — PROGRESS NOTES
Report called and given to P9 RN  Spoke with pt's daughter Karishma Ken on the phone who was questioning her mother moving out of ICU  Assured daughter her mom at this point did not require ICU level of care and was moving to Step Down 2 level  Updated on mom's progress of care  Surgical team notified

## 2018-02-06 NOTE — PROGRESS NOTES
Addendum:  Patient is now receiving tube feed via feeding tube  She is tolerating it well  Hopefully, TPN can be discontinued soon, to decrease risk of bacteremia/fungemia  Progress Note - Infectious Disease   Joshua Serrato 36 y o  female MRN: [id number]  Unit/Bed#: ICU 10 Encounter: [id number]      Impression/Recommendations:  1   Intra-abdominal/pelvic abscesses   Patient is status post repeat abdominal washout   She has multiple drains in place   She is status post placement of drainage in a new perisplenic collection   She is clinically well and systemically stable  Operative culture from [facility], growing only ampicillin susceptible Enterococcus   Operative culture here also growing  only ampicillin susceptible Enterococcus   Latest culture also grew yeast, in addition to Enterococcus  Repeat CT from 2/4showed improving collection  Continue with IV Unasyn/fluconazole  Follow-up on final culture of new perisplenic collection      2   Possible infected  shunt   Given presence of tip of  shunt in peritoneal space, there is high probability of  shunt infection   Fortunately, patient has no symptoms concerning for meningitis   She has neurological deficits   She is status post tapping of  shunt at [facility]   CSF culture there had no growth but patient had prior antibiotic  Belen Donaldson will go ahead and treat her for possible/presumptive  shunt infection   Patient is now status post  shunt resection   CSF culture here also negative   With removal shunt, antibiotic course can be decreased to 2-3 weeks     Antibiotic plan as in above  Likely treat with IV antibiotic x 2 post VPS resection      3   Gastric perforation, status post repair, with persistent leak   She is now status post placement of esophageal stent   Repeat video barium swallow from last week with no further leakage   Unfortunately, repeat abdomen/pelvis CT showed recurrent leak with new perisplenic collection  Adair Magallon is now status post placement of duodenal feeding tube and drainage of the perisplenic collection   However, both Critical Care service and GI service feel that patient should stay on TPN and not use feeding tube for enteral feeding  Repeat CT from yesterday showed no further leak  Monitor for recurrent leak  Patient will most likely need a repeat barium swallow down the line      4   Bilateral pleural effusion, status post chest tube placement  This is most likely sympathetic effusion, secondary to intra-abdominal abscesses  CT management per Critical Care Service      5  Recurrent leukocytosis   WBC still elevated but stable  Repeat CT with improving collections and no new collection  Antibiotic plan as in above  Monitor WBC and temperature      6  Septic shock, on presentation at [facility]   This is secondary to gastric perforation   Patient is now hemodynamically stable   All cultures from [facility] obtained and reviewed, now on chart   All blood cultures were negative   Urine culture negative   CSF culture negative   Operative culture positive for Enterococcus, as in above  Antibiotic plan as in above  Monitor hemodynamics      Discussed with the patient and her fiance in detail regarding above plan      Antibiotics:  Unasyn  POD # 12  Fluconazole # 8     Subjective:  Patient is status post removal of VPS  She remains in ICU  Her abdominal pain is stable and controlled     Mild headache  Mild chest wall pain  She is awake and alert  Temperature low-grade but continues to trend down   No chills  She is tolerating antibiotic well   No nausea, vomiting or diarrhea  Objective:  Vitals:  HR:  [] 92  Resp:  [14-33] 22  BP: (126-169)/(72-89) 158/78  SpO2:  [95 %-100 %] 96 %  Temp (24hrs), Av 7 °F (37 1 °C), Min:98 1 °F (36 7 °C), Max:99 9 °F (37 7 °C)  Current: Temperature: 98 9 °F (37 2 °C)    Physical Exam:     General: Awake, alert, cooperative, no distress     Chest:  Old VPS site clean  No drainage  No erythema/warmth  Minimal tenderness  Lungs: Expansion symmetric, no rales, no wheezing, respirations unlabored  Heart[de-identified]  Tachycardic with regular rhythm, S1 and S2 normal, no murmur  Abdomen: Soft, stable mild distention  Incision clean  Drain with serous purulent fluid  Improved incisional tenderness  Extremities: Trace edema  No erythema/warmth  No ulcer  Nontender to palpation  Skin:  No rash  Invasive Devices     Peripherally Inserted Central Catheter Line            PICC Line 49/31/52 Right Basilic 16 days          Drain            Closed/Suction Drain Left LLQ Bulb 19 Fr  11 days    Closed/Suction Drain Left LUQ Bulb 19 Fr  11 days    Closed/Suction Drain Right RLQ Bulb 19 Fr  11 days    Closed/Suction Drain Right RUQ Accordion 19 Fr  11 days    Closed/Suction Drain Left Back Bulb 12 Fr  6 days    NG/OG/Enteral Tube Enteral Feeding Tube Right nares 6 days                Labs studies:   I have personally reviewed pertinent labs      Results from last 7 days  Lab Units 02/05/18  0524 02/04/18  0535 02/03/18  0438 02/02/18  0415   SODIUM mmol/L 135* 136 139 139   POTASSIUM mmol/L 3 6 3 9 4 1 3 6   CHLORIDE mmol/L 97* 97* 100 101   CO2 mmol/L 34* 35* 35* 35*   ANION GAP mmol/L 4 4 4 3*   BUN mg/dL 17 18 19 18   CREATININE mg/dL 0 28* 0 30* 0 27* 0 27*   EGFR ml/min/1 73sq m 144 141 146 146   GLUCOSE RANDOM mg/dL 137 144* 139 159*   CALCIUM mg/dL 8 5 8 7 8 6 9 0   AST U/L  --   --   --  15   ALT U/L  --   --   --  15   ALK PHOS U/L  --   --   --  104   TOTAL PROTEIN g/dL  --   --   --  4 9*   BILIRUBIN TOTAL mg/dL  --   --   --  0 38       Results from last 7 days  Lab Units 02/05/18  0524 02/04/18  0535 02/03/18  0438   WBC Thousand/uL 26 68* 31 47* 25 55*   HEMOGLOBIN g/dL 9 0* 9 5* 9 0*   PLATELETS Thousands/uL 640* 731* 648*       Results from last 7 days  Lab Units 02/05/18  1215 01/31/18  0902 01/31/18  0845 01/31/18  0842   GRAM STAIN RESULT  No Polys or Bacteria seen No Polys or Bacteria seen Rare Polys  No bacteria seen 1+ Polys  No bacteria seen   BODY FLUID CULTURE, STERILE   --  Few Colonies of Enterococcus faecalis*  Few Colonies of Yeast* No growth No growth       Imaging Studies:   I have personally reviewed pertinent imaging study reports and images in PACS  EKG, Pathology, and Other Studies:   I have personally reviewed pertinent reports

## 2018-02-06 NOTE — PROGRESS NOTES
Progress Note - Bariatric Surgery   Isidro García 36 y o  female MRN: [id number]  Unit/Bed#: ICU 10 Encounter: [id number]    Assessment:  37 yo F s/p hiatal hernia repair 7/17 complicated by gastric leak/perforation, now s/p laparoscopic washout & ?Angelita Fly repair (per patient as no document in chart) which subsequently failed and leaked, s/p esophagogastric endoluminal stenting by GI, s/p ex lap washout by general surgical service at hospitals  Appears to reflux around distal portion of stent as contrast extrav on UGI (1/28) and CTCAP with large fluid collection in perisplenic location (1/30)  Has had b/l chest tubes s/p removal (2/5), LUQ drain, and Keofeed placed by IR  Tube feeds started over the weekend with worsening leukocytosis, therefore, a CTCAP was performed that showed improvement in intra-abdominal collections after drainage with no apparent contrast extravasation extraluminally  Tube feeds currently at 20cc/hr  I discussed the findings with the patient at bedside and all questions were answered  I reiterated the minimum of 6 weeks with this endoluminal stent to allow healing of her gastrotomy and further imaging studies to evaluate  Plan:  - cont NPO/IVF  - optimize protein nutrition supplementation to allow best chance to heal gastrotomy; tolerating tube feeds at low volume; ok to advance tube feeds per primary/SCC; trend albumin/prealbumin  - Endoluminal stent - to remain for 6 weeks; although CTCAP 2/4 appeared to have no extraluminal contrast extrav; would recommend to wait until Monday 2/12 for repeat UGI  - persistent leukocytosis - cont to trend WBC; IV abx/antifungals per primary/SCC; ADENIKE drains serosang      Subjective/Objective   Chief Complaint: tired    Subjective: -n/v; +f/bm, OOB with nursing staff    Objective:     Vitals: Blood pressure 154/89, pulse 90, temperature 98 5 °F (36 9 °C), temperature source Oral, resp   rate (!) 24, height 5' 4" (1 626 m), weight 77 8 kg (171 lb 8 3 oz), SpO2 96 %, not currently breastfeeding  ,Body mass index is 29 44 kg/m²  Intake/Output Summary (Last 24 hours) at 02/06/18 0818  Last data filed at 02/06/18 0701   Gross per 24 hour   Intake          3238 62 ml   Output             2780 ml   Net           458 62 ml       Invasive Devices     Peripherally Inserted Central Catheter Line            PICC Line 07/48/45 Right Basilic 16 days          Drain            Closed/Suction Drain Left LLQ Bulb 19 Fr  11 days    Closed/Suction Drain Left LUQ Bulb 19 Fr  11 days    Closed/Suction Drain Right RLQ Bulb 19 Fr  11 days    Closed/Suction Drain Right RUQ Accordion 19 Fr  11 days    Closed/Suction Drain Left Back Bulb 12 Fr  5 days    NG/OG/Enteral Tube Enteral Feeding Tube Right nares 5 days                Physical Exam: Incision c/d/i; skin staples; ADENIKE drains serosang, stripped; soft/minimal tenderness/nd, no r/g    Lab, Imaging and other studies: I have personally reviewed pertinent labs    CBC: No results found for: WBC, HGB, HCT, MCV, PLT, ADJUSTEDWBC, MCH, MCHC, RDW, MPV, NRBC  CMP: No results found for: NA, CL, CO2, ANIONGAP, BUN, CREATININE, GLUCOSE, CALCIUM, AST, ALT, ALKPHOS, PROT, ALBUMIN, BILITOT, EGFR  VTE Pharmacologic Prophylaxis: Sequential compression device (Venodyne)  and Heparin  VTE Mechanical Prophylaxis: sequential compression device

## 2018-02-06 NOTE — PHYSICAL THERAPY NOTE
Physical Theraoy Tx Session:    2 forms of pt ID verified:name,birthdate and pt ID renato       02/06/18 1011   Pain Assessment   Pain Assessment 0-10   Pain Score 8   Pain Type Acute pain;Surgical pain   Pain Location Abdomen;Back   Pain Orientation Bilateral;Lower  (LBP and B ABD pain)   Hospital Pain Intervention(s) Repositioned; Ambulation/increased activity; Elevated; Emotional support;Relaxation technique; Rest   Restrictions/Precautions   Other Precautions Cognitive;Pain; Fall Risk;Telemetry;Multiple lines   General   Chart Reviewed Yes   Family/Caregiver Present Yes  (fiancee)   Cognition   Overall Cognitive Status Impaired   Arousal/Participation Responsive; Cooperative   Attention Attends with cues to redirect   Orientation Level Oriented to person;Oriented to place;Oriented to situation   Following Commands Follows one step commands with increased time or repetition  (2* slow mobility and inc pain,flat affect)   Subjective   Subjective pt supine in bed resting comfortably;pt's fiancee present during therapy intervention  Pt willing and agreeable to participate in therapy intervention;"I know this is good for me"   Bed Mobility   Rolling R 2  Maximal assistance   Additional items Assist x 1;Bedrails; Increased time required;Verbal cues;LE management  (pt able to initiate B rolling towards bedrail needing modAx1)   Rolling L 2  Maximal assistance   Additional items Assist x 1;Bedrails; Increased time required;Verbal cues  (pt able to initiate B rolling towards bedrail needing modAx1)   Supine to Sit 2  Maximal assistance   Additional items Assist x 2;HOB elevated; Bedrails; Increased time required;Verbal cues;LE management   Sit to Supine 2  Maximal assistance   Additional items Assist x 2;Bedrails; Increased time required;Verbal cues;LE management   Transfers   Sit to Stand 3  Moderate assistance   Additional items Assist x 2; Increased time required;Verbal cues   Stand to Sit 3  Moderate assistance   Additional items Assist x 2;Bedrails; Increased time required;Verbal cues  (for safety,education and control descent)   Additional Comments pt declined ambulation steps at this time;static stand 20 seconds x2 for positioning and repositioning of bed linens;sit<->stand transfer x2 needing modAx2 for both transfer trials B HHA   Ambulation/Elevation   Gait Assistance Not tested  (pt declined)   Balance   Static Sitting Fair   Dynamic Sitting (zero)   Static Standing Zero   Endurance Deficit   Endurance Deficit Yes   Endurance Deficit Description deconditioning,weakness,reports of dizziness throughout with BP WNL (126/82 in sitting)   Activity Tolerance   Activity Tolerance Patient limited by fatigue;Patient limited by pain  (fair)   Nurse Made Aware yes Jg Rice)   Exercises   Heelslides Supine;10 reps;AROM; Bilateral   Hip Abduction Supine;10 reps;AROM; Bilateral   Hip Adduction Supine;10 reps;AROM; Bilateral   Ankle Pumps Supine;20 reps;AROM; Bilateral   Assessment   Prognosis Good   Problem List Decreased strength;Decreased range of motion;Decreased endurance; Impaired balance;Decreased mobility; Decreased coordination;Decreased skin integrity;Pain   Assessment Pt able to perform BM, B rolling and sit<->stand transfers during tx session  Pt needed maxAX2 for BM,maxAX1 for B rolling and modAX2 for sit<->stand transfers from raised surface  Pt able to static stand 20 seconds x2 sit<->stand transfers with B HHA for positioning and repositioning of bed linens and to inc B weight bearing throughout BLE  Pt able to perform and complete BLE ther ex supine in bed premobility AROM with minAx1 for instruction  Pt has flat affect throughout and reports 8/10 pain B ABD and LBP at rest and during mobility   Pt would cont to benefit from skilled inpt PT services to maximize functional independence   Goals   Patient Goals to try   LTG Expiration Date 02/15/18   Long Term Goal #1 pt will be able to perform BM modAx1,B rolling modAX1 and sit to stand transfers to and from various surfaces modAx1 consistently,I with BLE HEP in various positions consistently,PT to see for ambulation assessment and STG for gait to follow   Plan   Treatment/Interventions Functional transfer training;LE strengthening/ROM; Therapeutic exercise; Endurance training;Patient/family training;Bed mobility;Spoke to nursing;Spoke to case management; Family   Progress Slow progress, decreased activity tolerance   PT Frequency 5x/wk   Recommendation   Recommendation (inpt rhb)   Equipment Recommended (pt currently in [facility];DME TBD during gait assess)   Skilled PT recommended while in hospital and upon DC to progress pt toward treatment goals

## 2018-02-06 NOTE — PROGRESS NOTES
Progress Note - Thoracic Surgery   Shereen Babcock 36 y o  female MRN: [id number]  Unit/Bed#: ICU 10 Encounter: [id number]    Assessment:  36y o -year-old female with gastric perforation s/p hiatal hernia repair at outside hospital, s/p esophageal stent placement, exploratory laparotomy and washout  Has had persistent esophageal/gastric leak on imaging  Had bilateral pleural effusions which were drained  Perisplenic fluid collection was drained        - Chest tubes removed yesterday, on RA this AM  -  shunt removed by NSx    Plan:  Advance tube feeds to goal  Continue TPN  Trend WBC  Drains per surgery  Antibiotics per ID  Thoracics available for surgical intervention if required- at this time will defer management to general surgery/critical care      Subjective/Objective   Chief Complaint: None    Subjective: Continues to have pain issues  On ketamine 0 2  Per nursing patient is very reluctant to move around / be mobilized    Objective:     Blood pressure 154/89, pulse 90, temperature 98 5 °F (36 9 °C), temperature source Oral, resp  rate (!) 24, height 5' 4" (1 626 m), weight 77 8 kg (171 lb 8 3 oz), SpO2 96 %, not currently breastfeeding  ,Body mass index is 29 44 kg/m²        Intake/Output Summary (Last 24 hours) at 02/06/18 0804  Last data filed at 02/06/18 0701   Gross per 24 hour   Intake          3238 62 ml   Output             2780 ml   Net           458 62 ml       Invasive Devices     Peripherally Inserted Central Catheter Line            PICC Line 92/53/05 Right Basilic 16 days          Drain            Closed/Suction Drain Left LLQ Bulb 19 Fr  11 days    Closed/Suction Drain Left LUQ Bulb 19 Fr  11 days    Closed/Suction Drain Right RLQ Bulb 19 Fr  11 days    Closed/Suction Drain Right RUQ Accordion 19 Fr  11 days    Closed/Suction Drain Left Back Bulb 12 Fr  5 days    NG/OG/Enteral Tube Enteral Feeding Tube Right nares 5 days                Physical Exam:   Gen: A&O, NAD  Cardio: RRR  Lungs: CTAB  Abd: Soft, non distended, mildly tender diffusely   Incision c/d/i ADENIKE x 5 to abdomen, all serous now      Lab, Imaging and other studies:CBC: No results found for: WBC, HGB, HCT, MCV, PLT, ADJUSTEDWBC, MCH, MCHC, RDW, MPV, NRBC, CMP: No results found for: NA, K, CL, CO2, ANIONGAP, BUN, CREATININE, GLUCOSE, CALCIUM, AST, ALT, ALKPHOS, PROT, ALBUMIN, BILITOT, EGFR  VTE Pharmacologic Prophylaxis: Heparin  VTE Mechanical Prophylaxis: sequential compression device

## 2018-02-06 NOTE — POST OP PROGRESS NOTES
Progress Note - Neurosurgery   Yonathan Flores 36 y o  female MRN: [id number]  Unit/Bed#: ICU 10 Encounter: [id number]    Assessment:  1  POD1 removal of VPS  2  Procedure day 8 externalization of right-sided ventriculoperitoneal shunt  3  History of ventriculoperitoneal shunt for idiopathic intracranial hypertension (originally placed May 30, 2017)  4  Left upper extremity occlusive thrombophlebitis cephalic vein  5  Peritonitis   6  Gastric perforation status post repair    Plan:  71-year-old female transfer [facility] for esophageal stent status post upper gastric injury during laparoscopic paraesophageal hernia repair  Patient developed sepsis and required multiple operative procedures  Upon transfer to [facility], she underwent a esophageal stent and externalization ventriculoperitoneal shunt which has since been completed removed  · Exam reveals GCS 14 (E3, V5, M6) with diffuse weakness  Arousable to voice and slow to answer questions though responses appropriate  Following commands  Incision CDI  · ophthalmology evaluation completed  No optic nerve edema on exam today  Choroidal nevus right eye-routine follow-up  · Pain control per primary team   · Ongoing medical management per primary team     · ADENIKE drains x5  · Trend white count (31 47) 26 68 as of 2/5/18  · Status post IR perisplenic drain placement bilateral chest tubes for pleural effusion along with Keofed placement  · Chest tube placed to suction until cultures negative  · Advance TF to goal  Continue TPN  · Infectious disease following - continue on IV Unasyn anticipated 2-3 weeks  Continue fluconazole  · Mobilized out of bed  Encouraged mobilization out of bed  · DVT PPX:  Heparin and SCDs on during exam   · No further neurosurgical intervention anticipated at this juncture  Consider repeat CT head prior to discharge  · Plan follow-up 2wk and 6wk POV       Subjective/Objective   Chief Complaint: postoperative follow-up    Subjective: Patient offers limited responses  Denies headache or visual changes  Notes abd and scapular pain  Denies new weakness  Objective: Laying in bed  NAD  I/O       02/04 0701 - 02/05 0700 02/05 0701 - 02/06 0700 02/06 0701 - 02/07 0700    I V  (mL/kg) 338 7 (4 8) 355 3 (4 6) 56 4 (0 7)    NG/      IV Piggyback 500 700 100    TPN 1531 2 2164 6 278 4    Feedings 250 250 80    Total Intake(mL/kg) 2919 9 (41 3) 3469 9 (44 6) 514 8 (6 6)    Urine (mL/kg/hr) 2465 (1 5) 2280 (1 2) 550 (1 8)    Emesis/NG output       Drains 95 (0 1) 100 (0 1)     Stool  0 (0)     Chest Tube 160 (0 1) 50 (0)     Total Output 2720 2430 550    Net +199 9 +1039 9 -35 2           Unmeasured Urine Occurrence 1 x 3 x 1 x    Unmeasured Stool Occurrence  2 x           Invasive Devices     Peripherally Inserted Central Catheter Line            PICC Line 21/99/09 Right Basilic 16 days          Drain            Closed/Suction Drain Left LLQ Bulb 19 Fr  11 days    Closed/Suction Drain Left LUQ Bulb 19 Fr  11 days    Closed/Suction Drain Right RLQ Bulb 19 Fr  11 days    Closed/Suction Drain Right RUQ Accordion 19 Fr  11 days    Closed/Suction Drain Left Back Bulb 12 Fr  6 days    NG/OG/Enteral Tube Enteral Feeding Tube Right nares 6 days                Physical Exam:  Vitals: Blood pressure 126/86, pulse 90, temperature 98 5 °F (36 9 °C), temperature source Oral, resp  rate (!) 31, height 5' 4" (1 626 m), weight 77 8 kg (171 lb 8 3 oz), SpO2 96 %, not currently breastfeeding  ,Body mass index is 29 44 kg/m²        General appearance: arousable to voice, appears stated age, cooperative and no distress  Head: Normocephalic, without obvious abnormality, incision CDI  Eyes: EOMI, PERRL  Neck: supple, symmetrical, trachea midline   Lungs: non labored breathing  Heart: regular heart rate  Neurologic:   Mental status: Alert, oriented x3, thought content appropriate  Cranial nerves: grossly intact (Cranial nerves II-XII), voice soft but clear  Sensory: normal to LT  Motor: moving all extremities with diffuse weakness, LE>UE  Reflexes: no clonus  Coordination: finger to nose normal bilaterally, no drift bilaterally      Lab Results:    Results from last 7 days  Lab Units 02/05/18  0524 02/04/18  0535 02/03/18  0438   WBC Thousand/uL 26 68* 31 47* 25 55*   HEMOGLOBIN g/dL 9 0* 9 5* 9 0*   HEMATOCRIT % 28 0* 29 7* 27 9*   PLATELETS Thousands/uL 640* 731* 648*   NEUTROS PCT % 83*  --   --    MONOS PCT % 7  --   --    MONO PCT MAN %  --  4 3*       Results from last 7 days  Lab Units 02/05/18  0524 02/04/18  0535 02/03/18  0438 02/02/18  0415   SODIUM mmol/L 135* 136 139 139   POTASSIUM mmol/L 3 6 3 9 4 1 3 6   CHLORIDE mmol/L 97* 97* 100 101   CO2 mmol/L 34* 35* 35* 35*   BUN mg/dL 17 18 19 18   CREATININE mg/dL 0 28* 0 30* 0 27* 0 27*   CALCIUM mg/dL 8 5 8 7 8 6 9 0   TOTAL PROTEIN g/dL  --   --   --  4 9*   BILIRUBIN TOTAL mg/dL  --   --   --  0 38   ALK PHOS U/L  --   --   --  104   ALT U/L  --   --   --  15   AST U/L  --   --   --  15   GLUCOSE RANDOM mg/dL 137 144* 139 159*       Results from last 7 days  Lab Units 02/05/18  0524 02/04/18  0535 02/03/18  0438   MAGNESIUM mg/dL 2 0 2 0 1 9       Results from last 7 days  Lab Units 02/04/18  0535 02/03/18  0438 02/02/18  0415   PHOSPHORUS mg/dL 3 6 3 3 2 8       Results from last 7 days  Lab Units 02/04/18  1456   INR  1 26*   PTT seconds 37*     No results found for: TROPONINT  ABG:  Lab Results   Component Value Date    PHART 7 484 (H) 01/25/2018    WIJ3EAD 33 6 (L) 01/25/2018    PO2ART 165 1 (H) 01/25/2018    [id number] 24 7 01/25/2018    BEART 1 6 01/25/2018    SOURCE Line, Arterial 01/25/2018       Imaging Studies: I have personally reviewed pertinent reports  and I have personally reviewed pertinent films in PACS    EKG, Pathology, and Other Studies: I have personally reviewed pertinent reports        VTE Pharmacologic Prophylaxis: Heparin    VTE Mechanical Prophylaxis: sequential compression device

## 2018-02-06 NOTE — PROGRESS NOTES
Progress Note - Anesthesia Acute Pain Management    Sherry Whitley 36 y o  female MRN: [id number]  Unit/Bed#: ICU 10 Encounter: [id number]      SURGERY DATE: 2/5/2018  Post-Op Diagnosis Codes:     *  (ventriculoperitoneal) shunt status [Z98 2]    Assessment:       37 yo F s/p hiatal hernia repair 0/37 complicated by gastric leak/perforation, now s/p laparoscopic washout & ?Lesley [place] repair     Principal Problem:    Gastric leak  Active Problems:    Acute peritonitis    Idiopathic intracranial hypertension    S/P  shunt     (ventriculoperitoneal) shunt status    Murmur, cardiac    Refusal of blood product    Gastroesophageal reflux disease    Choroidal nevus, right eye      Plan:   1  Continue ketamine at current dose  Plan to wean once more reliably taking POs  2  Continue PRN hydromorphone  APS will continue to follow; please call  ( [facility] [phone]) with any questions      Pain Course:    24 hour history:  Patient states pain control acceptable with scores ranging from 4-8/10  No side effects noted from ketamine  Demand dosing of opioids results in improvement in pain      Meds/Allergies   current meds:   Current Facility-Administered Medications   Medication Dose Route Frequency    acetaminophen (TYLENOL) rectal suppository 325 mg  325 mg Rectal Q4H PRN    Adult 3-in-1 TPN (custom base / custom electrolytes)   Intravenous Continuous    Adult 3-in-1 TPN (custom base / custom electrolytes)   Intravenous Continuous    ampicillin-sulbactam (UNASYN) 3 g in sodium chloride 0 9 % 100 mL IVPB  3 g Intravenous Q6H    bisacodyl (DULCOLAX) rectal suppository 10 mg  10 mg Rectal Daily PRN    fluconazole (DIFLUCAN) IVPB (premix) 400 mg  400 mg Intravenous Q24H    gabapentin (NEURONTIN) oral solution 300 mg  300 mg Oral HS    heparin (porcine) subcutaneous injection 5,000 Units  5,000 Units Subcutaneous Q12H [facility]    HYDROmorphone (DILAUDID) injection 0 5 mg  0 5 mg Intravenous Q3H PRN    HYDROmorphone (DILAUDID) injection 1 mg  1 mg Intravenous Q3H PRN    insulin lispro (HumaLOG) 100 units/mL subcutaneous injection 1-5 Units  1-5 Units Subcutaneous Q6H [address]    iohexol (OMNIPAQUE) 240 MG/ML solution 50 mL  50 mL Oral 90 min pre-procedure    ketamine 250 mg (STANDARD CONCENTRATION) IV in sodium chloride 0 9% 250 mL  0 2 mg/kg/hr Intravenous Continuous    metoprolol (LOPRESSOR) injection 10 mg  10 mg Intravenous Q6H    mirtazapine (REMERON SOL-TAB) dispersible tablet 15 mg  15 mg Oral HS    nortriptyline (PAMELOR) capsule 10 mg  10 mg Oral BID    pantoprazole (PROTONIX) injection 40 mg  40 mg Intravenous Q24H MYRNA       Allergies   Allergen Reactions    Benadryl [Diphenhydramine] Swelling    Phenergan [Promethazine] Hives       Objective     Physical Exam: /86   Pulse 90   Temp 98 5 °F (36 9 °C) (Oral)   Resp (!) 31   Ht 5' 4" (1 626 m)   Wt 77 8 kg (171 lb 8 3 oz)   LMP  (LMP Unknown)   SpO2 96%   Breastfeeding? No   BMI 29 44 kg/m²   Gen: Well appearing and well nourished, mild acute  Skin: Warm, Dry   HEENT:  PERRLA, EOMI  Pul: Lungs CTAB  Ext:  No cyanosis or edema  Neuro: AAOx3; CN II-XII grossly intact; no gross focal neurologic deficits  Psych:  appropriate    Labs:                  SIGNATURE: Leesa Larson MD  DATE: February 6, 2018  TIME: 12:07 PM    Please note that the APS provides consultative services regarding pain management only  With the exception of ketamine and epidural infusions and except when indicated, final decisions regarding starting or changing doses of analgesic medications are at the discretion of the consulting service  Off hours consultation and/or medication management is generally not available

## 2018-02-06 NOTE — PROGRESS NOTES
Progress Note - Critical Care   Lizbeth Jaguar 36 y o  female MRN: [id number]  Unit/Bed#: ICU 10 Encounter: [id number]    Assessment: 35 y/o female with complicated PMHx with hydrocephalus with  shunt, Hx Prerna en y, and recent hernia repair who presents s/p ex-lap x2 and externalization of  shunt  Found to have further anastamotic leak on CT scan unamenable to further stenting  IR for drainage of abdominal collection, bilateral thoracentesis with placement of B/L CT and keofed placement  Repeat CT with resolved collection, jejunal tube feeds started   shunt and bilateral chest tubes removed yesterday       Principal Problem:    Gastric leak  Active Problems:    Acute peritonitis    Idiopathic intracranial hypertension    S/P  shunt     (ventriculoperitoneal) shunt status    Murmur, cardiac    Refusal of blood product    Gastroesophageal reflux disease    Choroidal nevus, right eye  Resolved Problems:    Peritonitis     Plan:   · Neuro: GCS 15 AAOx3  ? Analgesia: ketamine drip 0 2 and PRN dilaudid per APS recs (0 5 mg x2 and 1 mg x2 overnight), Restarted on home gabapentin  ? Sedation: as above  ? Anxiety: ODT remeron, restart home nortriptyline 10mg BID  ? VPS 2/2 pseudotumor cerebri externalized 1/25, removed yesterday by NSGY  · CV:   ? Systolic murmur with radiation to the apex, TTE unrevealing  ? Hypertension: takes no medications at home, possibly 2/2 pain, continue to monitor and assess pain   ? Metoprolol 10mg IV Q6h  · Lung:  ? B/L Pleural effusions s/p CT placement: B/L CT removed yesterday  ? Apical pneumothorax on CXR following CT removal yesterday  ? CXR ordered for tomorrow AM  · GI:   ? GERD: continue Protonix   ? GE junction leak s/p stenting: Tube feeds tolerated at 20  ? RLQ: 20  ? LUQ: 35  ? LLQ: 15  ? RUQ: 0  ? Splenic: 30   · FEN:   ? Fluids: No maintenance   ? Electrolytes - Monitor/trend - lab holiday today as labs have been normalized  ?  Nutrition: TF at 20 ml/hr, increase 10cc/hr until at goal   ? TPN until at goal with TF  · :   ? UOP adequate: 1 2 cc/kg/hr over past 24 hours  · ID:   ? Splenic drain cultures: enterococcus and yeast, continue Unasyn day 11 and Fluconazole day 7 per ID recs   ? Leukocytosis down-trending 26 6 from 31 47, afebrile   · Heme:   ? Hgb has been stable, blood management program  ? DVT PPX: SQH, SCDs  · Endo:   ? SSI algorithm 2  · Msk/Skin:   ? OOB to chair  ? PT/OT  · Disposition: Step down 2 today    Chief Complaint: I feel OK    HPI/24hr events:   S/p removal of  shunt and removal of b/l chest tubes yesterday afternoon  Patient has mild incisional and back pain this morning she says is not a new pain  She denies any N/V and is voiding and having BM per patient  Physical Exam:   Physical Exam   Constitutional: No distress  HENT:   Right temproal incision clean/dry/intact  Keofed in place   Neck: Normal range of motion  Neck supple  Cardiovascular: Normal rate and regular rhythm  Murmur heard  Holosystolic murmur   Pulmonary/Chest: Effort normal and breath sounds normal  No respiratory distress  She has no wheezes  On anterior exam   Abdominal: Soft  Bowel sounds are normal  There is tenderness  There is no guarding  Midline incision closed with staples clean/ dry/ intact, 4 anteriro HAYDER drains with minimal serosanguinous drainage in place  1 Posterior hayder drain with minimal serosanguinous fluid in place  Diffuse abdominal tenderness to palpation   Musculoskeletal:   Trace edema noted on hands and lower extremities   Neurological:   Appears sedated 2/2 ketamine drip however responsive and oriented     Psychiatric: She has a normal mood and affect  Her behavior is normal    Vitals reviewed        Vitals:    02/06/18 0300 02/06/18 0400 02/06/18 0500 02/06/18 0600   BP: 147/73 145/81 152/84 145/78   Pulse: 92 92 80 86   Resp: 22 16 (!) 30 (!) 23   Temp:  98 5 °F (36 9 °C)     TempSrc:  Oral     SpO2: 96% 97% 97% 96%   Weight:    77 8 kg (171 lb 8 3 oz)   Height:         Arterial Line BP: 166/82  Arterial Line MAP (mmHg): 112 mmHg    Temperature:   Temp (24hrs), Av 9 °F (37 2 °C), Min:98 1 °F (36 7 °C), Max:99 9 °F (37 7 °C)    Current: Temperature: 98 5 °F (36 9 °C)    Weights:   IBW: 54 7 kg    Body mass index is 29 44 kg/m²  Weight (last 2 days)     Date/Time   Weight    18 0600  77 8 (171 52)               Non-Invasive/Invasive Ventilation Settings:  Respiratory    Lab Data (Last 4 hours)    None         O2/Vent Data (Last 4 hours)    None              No results found for: PHART, FRT1OEB, PO2ART, PVX7XGQ, Q8CEOLJT, BEART, SOURCE  SpO2: SpO2: 96 %    Intake and Outputs:  I/O        07 -  0700  07 -  0700    I V  (mL/kg) 338 7 (4 8) 327 1 (4 2)    NG/     IV Piggyback 500 600    TPN 1531 2 2025  4    Feedings 250 210    Total Intake(mL/kg) 2919 9 (41 3) 3162 5 (40 6)    Urine (mL/kg/hr) 2465 (1 5) 1830 (1)    Drains 95 (0 1) 100 (0 1)    Stool  0 (0)    Chest Tube 160 (0 1) 50 (0)    Total Output 2720 1980    Net +199 9 +1182 5          Unmeasured Urine Occurrence 1 x 3 x    Unmeasured Stool Occurrence  2 x        UOP: 95cc/hour     Nutrition:        Diet Orders            Start     Ordered    18 1413  Diet Enteral/Parenteral; Tube Feeding No Oral Diet; Jevity 1 2 Claude; 10  Diet effective now     Comments:  Remain at 10 ml/hr until  tonight, 2018 then advance to 20 ml/hr continuous until reevaluated for advance to goal tube feeds  Question Answer Comment   Diet Type Enteral/Parenteral    Enteral/Parenteral Tube Feeding No Oral Diet    Tube Feeding Formula: Jevity 1 2 Claude    Tube Feeding Continuous rate (mL/hr): 10    RD to adjust diet per protocol? No        18 1415        TF currently running at 20/hour with a goal of 59cc/hr   Formula:Jevity 1 2    Labs:     Results from last 7 days  Lab Units 18  0524 18  0535 18  0438   WBC Thousand/uL 26 68* 31 47* 25 55*   HEMOGLOBIN g/dL 9  0* 9 5* 9 0*   HEMATOCRIT % 28 0* 29 7* 27 9*   PLATELETS Thousands/uL 640* 731* 648*   NEUTROS PCT % 83*  --   --    MONOS PCT % 7  --   --    MONO PCT MAN %  --  4 3*      Results from last 7 days  Lab Units 02/05/18  0524 02/04/18  0535 02/03/18  0438 02/02/18  0415   SODIUM mmol/L 135* 136 139 139   POTASSIUM mmol/L 3 6 3 9 4 1 3 6   CHLORIDE mmol/L 97* 97* 100 101   CO2 mmol/L 34* 35* 35* 35*   BUN mg/dL 17 18 19 18   CREATININE mg/dL 0 28* 0 30* 0 27* 0 27*   CALCIUM mg/dL 8 5 8 7 8 6 9 0   TOTAL PROTEIN g/dL  --   --   --  4 9*   BILIRUBIN TOTAL mg/dL  --   --   --  0 38   ALK PHOS U/L  --   --   --  104   ALT U/L  --   --   --  15   AST U/L  --   --   --  15   GLUCOSE RANDOM mg/dL 137 144* 139 159*       Results from last 7 days  Lab Units 02/05/18  0524 02/04/18  0535 02/03/18  0438   MAGNESIUM mg/dL 2 0 2 0 1 9       Results from last 7 days  Lab Units 02/04/18  0535 02/03/18  0438 02/02/18  0415   PHOSPHORUS mg/dL 3 6 3 3 2 8        Results from last 7 days  Lab Units 02/04/18  1456   INR  1 26*   PTT seconds 37*         No results found for: TROPONINI      EKG: no new    Micro:  Lab Results   Component Value Date    WOUNDCULT 2+ Growth of Enterococcus faecalis (A) 01/25/2018       Allergies:    Allergies   Allergen Reactions    Benadryl [Diphenhydramine] Swelling    Phenergan [Promethazine] Hives       Medications:   Scheduled Meds:  Current Facility-Administered Medications:  acetaminophen 325 mg Rectal Q4H PRN Romayne Isaacs, MD    Adult TPN (CUSTOM BASE/CUSTOM ELECTROLYTE)  Intravenous Continuous Eliseo Farmer PA-C Last Rate: 69 6 mL/hr at 02/05/18 2213   ampicillin-sulbactam 3 g Intravenous Q6H Tosin Guevara MD Last Rate: 3 g (02/06/18 0448)   bisacodyl 10 mg Rectal Daily PRN Romayne Isaacs, MD    fluconazole 400 mg Intravenous Q24H ABNER Desai Last Rate: 400 mg (02/05/18 1500)   HYDROmorphone 0 5 mg Intravenous Q3H PRN Edmond Suarez PA-C    HYDROmorphone 1 mg Intravenous Q3H PRN Marti Neville MD    insulin lispro 1-5 Units Subcutaneous Q6H [address] Bobby Hand PA-C    iohexol 50 mL Oral 90 min pre-procedure Hannah Heaton MD    ketamine 0 2 mg/kg/hr Intravenous Continuous Torrie Pino MD Last Rate: 0 2 mg/kg/hr (02/06/18 0457)   lidocaine 2 patch Transdermal Daily Noel Maldonado PA-C    metoprolol 5 mg Intravenous Q6H Nayla JUAN Mcconnell PA-C    mirtazapine 15 mg Oral HS Nayla R AURELIO Mcconnell    pantoprazole 40 mg Intravenous Q24H [address] ABNER Tse      Continuous Infusions:  Adult TPN (CUSTOM BASE/CUSTOM ELECTROLYTE)  Last Rate: 69 6 mL/hr at 02/05/18 2213   ketamine 0 2 mg/kg/hr Last Rate: 0 2 mg/kg/hr (02/06/18 0457)     PRN Meds:    acetaminophen 325 mg Q4H PRN   bisacodyl 10 mg Daily PRN   HYDROmorphone 0 5 mg Q3H PRN   HYDROmorphone 1 mg Q3H PRN       VTE Pharmacologic Prophylaxis: Heparin  VTE Mechanical Prophylaxis: sequential compression device    Invasive lines and devices: Invasive Devices     Peripherally Inserted Central Catheter Line            PICC Line 68/77/81 Right Basilic 16 days          Drain            Closed/Suction Drain Left LLQ Bulb 19 Fr  11 days    Closed/Suction Drain Left LUQ Bulb 19 Fr  11 days    Closed/Suction Drain Right RLQ Bulb 19 Fr  11 days    Closed/Suction Drain Right RUQ Accordion 19 Fr  11 days    Closed/Suction Drain Left Back Bulb 12 Fr  5 days    NG/OG/Enteral Tube Enteral Feeding Tube Right nares 5 days                 Code Status: Level 1 - Full Code     Portions of the record may have been created with voice recognition software  Occasional wrong word or "sound a like" substitutions may have occurred due to the inherent limitations of voice recognition software  Read the chart carefully and recognize, using context, where substitutions have occurred       Griselda Rubin MD

## 2018-02-06 NOTE — SOCIAL WORK
PT/OT recommending STR  MIRI met with pt and her father at bedside and reviewed recommendation  MIRI provided pt with an in-network SNF list near pt home and reviewed process  She reports she will review with her mother

## 2018-02-06 NOTE — PROGRESS NOTES
Progress Note - General Surgery   Miladys Devlin 36 y o  female MRN: [id number]  Unit/Bed#: ICU 10 Encounter: [id number]    Assessment:  36y o -year-old female with gastric perforation s/p hiatal hernia repair at outside hospital, s/p esophageal stent placement, exploratory laparotomy and washout  Has had persistent esophageal/gastric leak on imaging  Had bilateral pleural effusions which were drained  Perisplenic fluid collection was drained       -afebrile, WBC 26 yesterday  -drains cloudy serous  -tolerating tube feeds @ 20/ml hr  -having bowel movements  -CTs removed yesterday  -keofed appears post pyloric on xray   - shunt removed yesterday       Plan:    -ok to advance tube feeds to goal  -tpn again today  -prolonged course of IV antibiotics per ID, treating gastric leak and presumed  shunt infection         Subjective/Objective   Chief Complaint:     Subjective:     Objective:     Blood pressure 152/84, pulse 80, temperature 98 5 °F (36 9 °C), temperature source Oral, resp  rate (!) 30, height 5' 4" (1 626 m), weight 70 7 kg (155 lb 13 8 oz), SpO2 97 %, not currently breastfeeding  ,Body mass index is 26 75 kg/m²        Intake/Output Summary (Last 24 hours) at 02/06/18 0547  Last data filed at 02/06/18 0538   Gross per 24 hour   Intake          3190 69 ml   Output             1980 ml   Net          1210 69 ml       Invasive Devices     Peripherally Inserted Central Catheter Line            PICC Line 89/97/38 Right Basilic 16 days          Drain            Closed/Suction Drain Left LLQ Bulb 19 Fr  11 days    Closed/Suction Drain Left LUQ Bulb 19 Fr  11 days    Closed/Suction Drain Right RLQ Bulb 19 Fr  11 days    Closed/Suction Drain Right RUQ Accordion 19 Fr  11 days    Closed/Suction Drain Left Back Bulb 12 Fr  5 days    NG/OG/Enteral Tube Enteral Feeding Tube Right nares 5 days                Physical Exam: General: AAOx3  Respiratory: BS b/l  Abdomen: Soft, NT, no rebound/guarding  RUQ/RLQ/LUQ/LLQ drain cloudy serous   Perisplenic drain serous   Heart: RRR, S1s2      Lab, Imaging and other studies:  I have personally reviewed pertinent lab results    , CBC:   No results found for: WBC, HGB, HCT, MCV, PLT, ADJUSTEDWBC, MCH, MCHC, RDW, MPV, NRBC, CMP:   No results found for: NA, K, CL, CO2, ANIONGAP, BUN, CREATININE, GLUCOSE, CALCIUM, AST, ALT, ALKPHOS, PROT, ALBUMIN, BILITOT, EGFR  VTE Pharmacologic Prophylaxis: Sequential compression device (Venodyne)   VTE Mechanical Prophylaxis: sequential compression device

## 2018-02-07 ENCOUNTER — APPOINTMENT (INPATIENT)
Dept: RADIOLOGY | Facility: HOSPITAL | Age: 43
DRG: 711 | End: 2018-02-07
Payer: COMMERCIAL

## 2018-02-07 LAB
BACTERIA SPEC BFLD CULT: ABNORMAL
BACTERIA SPEC BFLD CULT: ABNORMAL
GLUCOSE SERPL-MCNC: 170 MG/DL (ref 65–140)
GLUCOSE SERPL-MCNC: 175 MG/DL (ref 65–140)
GLUCOSE SERPL-MCNC: 181 MG/DL (ref 65–140)
GLUCOSE SERPL-MCNC: 187 MG/DL (ref 65–140)
GRAM STN SPEC: ABNORMAL

## 2018-02-07 PROCEDURE — 99233 SBSQ HOSP IP/OBS HIGH 50: CPT | Performed by: INTERNAL MEDICINE

## 2018-02-07 PROCEDURE — 71045 X-RAY EXAM CHEST 1 VIEW: CPT

## 2018-02-07 PROCEDURE — G8978 MOBILITY CURRENT STATUS: HCPCS

## 2018-02-07 PROCEDURE — 97530 THERAPEUTIC ACTIVITIES: CPT

## 2018-02-07 PROCEDURE — G8979 MOBILITY GOAL STATUS: HCPCS

## 2018-02-07 PROCEDURE — 97164 PT RE-EVAL EST PLAN CARE: CPT

## 2018-02-07 PROCEDURE — C9113 INJ PANTOPRAZOLE SODIUM, VIA: HCPCS | Performed by: NURSE PRACTITIONER

## 2018-02-07 PROCEDURE — 82948 REAGENT STRIP/BLOOD GLUCOSE: CPT

## 2018-02-07 RX ORDER — POTASSIUM CHLORIDE 29.8 MG/ML
40 INJECTION INTRAVENOUS ONCE
Status: DISCONTINUED | OUTPATIENT
Start: 2018-02-07 | End: 2018-02-07

## 2018-02-07 RX ADMIN — HEPARIN SODIUM 5000 UNITS: 5000 INJECTION, SOLUTION INTRAVENOUS; SUBCUTANEOUS at 09:05

## 2018-02-07 RX ADMIN — METOPROLOL TARTRATE 10 MG: 1 INJECTION, SOLUTION INTRAVENOUS at 22:33

## 2018-02-07 RX ADMIN — METOPROLOL TARTRATE 10 MG: 1 INJECTION, SOLUTION INTRAVENOUS at 03:50

## 2018-02-07 RX ADMIN — GABAPENTIN 300 MG: 250 SOLUTION ORAL at 22:33

## 2018-02-07 RX ADMIN — HEPARIN SODIUM 5000 UNITS: 5000 INJECTION, SOLUTION INTRAVENOUS; SUBCUTANEOUS at 20:19

## 2018-02-07 RX ADMIN — Medication 3 G: at 09:15

## 2018-02-07 RX ADMIN — INSULIN LISPRO 1 UNITS: 100 INJECTION, SOLUTION INTRAVENOUS; SUBCUTANEOUS at 17:14

## 2018-02-07 RX ADMIN — KETAMINE HYDROCHLORIDE 0.1 MG/KG/HR: 50 INJECTION, SOLUTION INTRAMUSCULAR; INTRAVENOUS at 19:41

## 2018-02-07 RX ADMIN — NORTRIPTYLINE HYDROCHLORIDE 10 MG: 10 CAPSULE ORAL at 09:05

## 2018-02-07 RX ADMIN — Medication 3 G: at 19:41

## 2018-02-07 RX ADMIN — METOPROLOL TARTRATE 10 MG: 1 INJECTION, SOLUTION INTRAVENOUS at 10:39

## 2018-02-07 RX ADMIN — INSULIN LISPRO 1 UNITS: 100 INJECTION, SOLUTION INTRAVENOUS; SUBCUTANEOUS at 00:02

## 2018-02-07 RX ADMIN — THIAMINE HYDROCHLORIDE: 100 INJECTION, SOLUTION INTRAMUSCULAR; INTRAVENOUS at 22:33

## 2018-02-07 RX ADMIN — NORTRIPTYLINE HYDROCHLORIDE 10 MG: 10 CAPSULE ORAL at 17:07

## 2018-02-07 RX ADMIN — Medication 3 G: at 15:26

## 2018-02-07 RX ADMIN — FLUCONAZOLE 400 MG: 2 INJECTION INTRAVENOUS at 17:06

## 2018-02-07 RX ADMIN — INSULIN LISPRO 1 UNITS: 100 INJECTION, SOLUTION INTRAVENOUS; SUBCUTANEOUS at 12:10

## 2018-02-07 RX ADMIN — INSULIN LISPRO 1 UNITS: 100 INJECTION, SOLUTION INTRAVENOUS; SUBCUTANEOUS at 05:57

## 2018-02-07 RX ADMIN — PANTOPRAZOLE SODIUM 40 MG: 40 INJECTION, POWDER, FOR SOLUTION INTRAVENOUS at 09:04

## 2018-02-07 RX ADMIN — METOPROLOL TARTRATE 10 MG: 1 INJECTION, SOLUTION INTRAVENOUS at 17:06

## 2018-02-07 RX ADMIN — Medication 3 G: at 03:49

## 2018-02-07 RX ADMIN — MIRTAZAPINE 15 MG: 15 TABLET, ORALLY DISINTEGRATING ORAL at 22:33

## 2018-02-07 NOTE — CONSULTS
Progress Note - Anesthesia Acute Pain Management    Yonathan Flores 36 y o  female MRN: [id number]  Unit/Bed#: [facility] 909-01 Encounter: [id number]      Assessment:   Principal Problem:    Gastric leak  Active Problems:    Acute peritonitis    Idiopathic intracranial hypertension    S/P  shunt     (ventriculoperitoneal) shunt status    Murmur, cardiac    Refusal of blood product    Gastroesophageal reflux disease    Choroidal nevus, right eye      Pain History  Current pain location(s): Abdomen and lower back  Pain Scale:   8/10  Current Analgesic regimen:  Ketamine gtt, hydormorphone IV 0 5mg-1mg q3h PRN, gabapentin 300mg qhs, tylenol CO 325mg q4h PRN  24 hour history: Patient transferred out of ICU, states her pain is much better controlled  Currently advancing tube feeds, she has been out of bed  Today is day 6 of ketamine infusion, plan is to start weaning now that she is more comfortable  Meds/Allergies       Allergies   Allergen Reactions    Benadryl [Diphenhydramine] Swelling    Phenergan [Promethazine] Hives       Objective     Temp:  [98 1 °F (36 7 °C)-99 2 °F (37 3 °C)] 98 1 °F (36 7 °C)  HR:  [] 95  Resp:  [16-33] 16  BP: (134-169)/(76-98) 169/86      Intake/Output Summary (Last 24 hours) at 02/07/18 1104  Last data filed at 02/07/18 0919   Gross per 24 hour   Intake          1205 67 ml   Output             1490 ml   Net          -284 33 ml                 Physical Exam: /86 (BP Location: Left arm)   Pulse 95   Temp 98 1 °F (36 7 °C) (Oral)   Resp 16   Ht 5' 4" (1 626 m)   Wt 77 7 kg (171 lb 4 8 oz)   LMP  (LMP Unknown)   SpO2 96%   Breastfeeding?  No   BMI 29 40 kg/m²   Gen: Well appearing and well nourished, NAD  CV: RRR, +s1/s2, no M/R/G  Pul: Lungs CTAB  Abd: Soft, tender to palpation  Ext:  No cyanosis or edema    Lab Results:  Lab Results   Component Value Date    WBC 26 68 (H) 02/05/2018    HGB 9 0 (L) 02/05/2018    HCT 28 0 (L) 02/05/2018    MCV 92 02/05/2018     (H) 02/05/2018     Lab Results   Component Value Date    GLUCOSE 137 02/05/2018    CALCIUM 8 5 02/05/2018     (L) 02/05/2018    K 3 6 02/05/2018    CO2 34 (H) 02/05/2018    CL 97 (L) 02/05/2018    BUN 17 02/05/2018    CREATININE 0 28 (L) 02/05/2018     Plan:   1) Wean ketamine to 0 1mg/kg/hr today, turn off tomorrow  2) Maintain all other pain medications      SIGNATURE: John Ryan MD  DATE: February 7, 2018  TIME: 11:04 AM

## 2018-02-07 NOTE — PLAN OF CARE
CARDIOVASCULAR - ADULT     Maintains optimal cardiac output and hemodynamic stability Progressing     Absence of cardiac dysrhythmias or at baseline rhythm Progressing        DISCHARGE PLANNING - CARE MANAGEMENT     Discharge to post-acute care or home with appropriate resources Progressing        GASTROINTESTINAL - ADULT     Minimal or absence of nausea and/or vomiting Progressing     Maintains or returns to baseline bowel function Progressing     Maintains adequate nutritional intake Progressing     Establish and maintain optimal ostomy function Progressing        GENITOURINARY - ADULT     Maintains or returns to baseline urinary function Progressing     Absence of urinary retention Progressing     Urinary catheter remains patent Progressing        HEMATOLOGIC - ADULT     Maintains hematologic stability Progressing        METABOLIC, FLUID AND ELECTROLYTES - ADULT     Electrolytes maintained within normal limits Progressing     Fluid balance maintained Progressing     Glucose maintained within target range Progressing        MUSCULOSKELETAL - ADULT     Maintain or return mobility to safest level of function Progressing     Maintain proper alignment of affected body part Progressing        NEUROSENSORY - ADULT     Achieves stable or improved neurological status Progressing     Achieves maximal functionality and self care Progressing        Nutrition/Hydration-ADULT     Nutrient/Hydration intake appropriate for improving, restoring or maintaining nutritional needs Progressing        Potential for Falls     Patient will remain free of falls Progressing        Prexisting or High Potential for Compromised Skin Integrity     Skin integrity is maintained or improved Progressing        RESPIRATORY - ADULT     Achieves optimal ventilation and oxygenation Progressing        SKIN/TISSUE INTEGRITY - ADULT     Skin integrity remains intact Progressing     Incision(s), wounds(s) or drain site(s) healing without S/S of infection Progressing     Oral mucous membranes remain intact Progressing

## 2018-02-07 NOTE — PHYSICAL THERAPY NOTE
PT Reevaluation     02/07/18 1430   Pain Assessment   Pain Assessment 0-10   Pain Score No Pain   Hospital Pain Intervention(s) Ambulation/increased activity;Repositioned   Response to Interventions unchanged   Home Living   Type of [address] One level   Prior Function   Level of [place] Independent with ADLs and functional mobility   Lives With [facility] Help From Family   Restrictions/Precautions   Other Precautions Chair Alarm;Cognitive;Multiple lines; Fall Risk   General   Family/Caregiver Present No   Cognition   Arousal/Participation Alert   Attention Attends with cues to redirect   Orientation Level Oriented to person;Oriented to place   Following Commands Follows one step commands with increased time or repetition   RUE Assessment   RUE Assessment (funct reach and grasp)   LUE Assessment   LUE Assessment (funct reach and grasp)   Strength RLE   RLE Overall Strength (3-/5)   Strength LLE   LLE Overall Strength 3-/5   Coordination   Movements are Fluid and Coordinated 0   Coordination and Movement Description weakness   Bed Mobility   Supine to Sit 3  Moderate assistance   Additional items HOB elevated; Bedrails; Increased time required;Verbal cues;LE management   Transfers   Sit to Stand 3  Moderate assistance   Additional items Increased time required;Verbal cues   Stand to Sit 4  Minimal assistance   Additional items Increased time required;Verbal cues   Stand pivot 3  Moderate assistance   Additional items Increased time required;Verbal cues; Assist x 2  (RW)   Ambulation/Elevation   Gait pattern Poor UE support; Improper Weight shift;Decreased foot clearance; Short stride; Step to;Excessively slow   Gait Assistance 3  Moderate assist   Additional items Assist x 2;Verbal cues; Tactile cues   Assistive Device Rolling walker   Distance 3 ft    Balance   Dynamic Sitting Fair  (forward reach)   Static Standing Poor +  (RW)   Dynamic Standing Poor  (RW)   Endurance Deficit   Endurance Deficit Yes   Endurance Deficit Description weakness deconditioning   Activity Tolerance   Activity Tolerance Patient limited by fatigue   Nurse Made Aware Achilles Debby   Assessment   Prognosis Good   Problem List Decreased strength;Decreased range of motion;Decreased endurance; Impaired balance;Decreased mobility; Decreased coordination;Decreased cognition; Impaired judgement;Decreased safety awareness;Decreased skin integrity   Assessment pt reevaluated for progression of mob and goals accordingly; currently exhibits improving strength; requires mod assist x2 w transf and amb 3 ft using RW- gait unsteady limited UE WB support, decreased foot clearance, shuffle step bed-chair walker reliant + falls risk; this session pt able to sit self-suported EOB and wt shift scoot w UE hold on bed rail arm rest; pt fatigues readily; needs much encouragement to improve OOB tolerance; will benefit from cont PT to progress w mob amb and activity tolerance; rec IP rehab when med cleared; updated goals as noted   Goals   Patient Goals not expressed   STG Expiration Date 02/21/18   Short Term Goal #1 1  Modified Independent with Bed Mobility Rolling Right and Left     2  Minimum assist with Bed Mobility Supine-Sit     3  Minimum assist with Transfer Bed-Chair     4  Increase Dynamic Sitting Balance at least 1 Grade for improved stability with functional reach activities     5  Increase Dynamic Standing Balance at least 1 Grade for improved ease with Activities of Daily Living     6  Increase Lower Extremity Strength at least 1 Grade for improved ease mobility tasks     7  Minimum assist with  Ambulation 50 feet using RW to facilitate home and community mobility 8  Indep w wc mob 150 ft   Plan   Treatment/Interventions Functional transfer training;LE strengthening/ROM; Therapeutic exercise; Endurance training;Cognitive reorientation;Patient/family training;Equipment eval/education; Bed mobility;Gait training; Compensatory technique education;Continued evaluation;Spoke to nursing   PT Frequency 5x/wk   Recommendation   Recommendation Post acute IP rehab   Equipment Recommended Radha Fozoe; Wheelchair   Barthel Index   Feeding 0   Bathing 0   Grooming Score 0   Dressing Score 0   Bladder Score 10   Bowels Score 10   Toilet Use Score 5   Transfers (Bed/Chair) Score 10   Mobility (Level Surface) Score 0   Stairs Score 0   Barthel Index Score 35

## 2018-02-07 NOTE — PROGRESS NOTES
Progress Note - General Surgery   Ohiopyle Foots 36 y o  female MRN: [id number]  Unit/Bed#: [facility] 909-01 Encounter: [id number]    Assessment:  36y o -year-old female with gastric perforation s/p hiatal hernia repair at outside hospital, s/p esophageal stent placement, exploratory laparotomy and washout  Has had persistent esophageal/gastric leak on imaging  Had bilateral pleural effusions which were drained  Perisplenic fluid collection was drained  - afebrile, tolerating TF's at 20, having BM       Plan:  - CXR again today to monitor ptx, ensure keofed not moving  - reorder TPN  - unasyn/diflucan per ID  - OOB/ambulate  - ketamine per APS  - consider advancing tube feeds today  - SQH/SCDs         Subjective/Objective   Chief Complaint:     Subjective: Feels like pain is improving, is passing flatus and having BM  Denies fever/chills, nausea/vomiting  Objective:     Blood pressure 148/91, pulse 93, temperature 98 9 °F (37 2 °C), temperature source Oral, resp  rate 18, height 5' 4" (1 626 m), weight 77 8 kg (171 lb 8 3 oz), SpO2 95 %, not currently breastfeeding  ,Body mass index is 29 44 kg/m²        Intake/Output Summary (Last 24 hours) at 02/07/18 0519  Last data filed at 02/06/18 2223   Gross per 24 hour   Intake             1365 ml   Output             2035 ml   Net             -670 ml       Invasive Devices     Peripherally Inserted Central Catheter Line            PICC Line 89/73/78 Right Basilic 17 days          Drain            Closed/Suction Drain Left LLQ Bulb 19 Fr  12 days    Closed/Suction Drain Left LUQ Bulb 19 Fr  12 days    Closed/Suction Drain Right RLQ Bulb 19 Fr  12 days    Closed/Suction Drain Right RUQ Accordion 19 Fr  12 days    Closed/Suction Drain Left Back Bulb 12 Fr  6 days    NG/OG/Enteral Tube Enteral Feeding Tube Right nares 6 days                Physical Exam:   NAD  RRR  Norm resp effort  Abd soft, distended, min tender around incision, ADENIKE x4 serosang  Perisplenic drain serous Lab, Imaging and other studies:  I have personally reviewed pertinent lab results    , CBC:   No results found for: WBC, HGB, HCT, MCV, PLT, ADJUSTEDWBC, MCH, MCHC, RDW, MPV, NRBC, CMP:   No results found for: NA, K, CL, CO2, ANIONGAP, BUN, CREATININE, GLUCOSE, CALCIUM, AST, ALT, ALKPHOS, PROT, ALBUMIN, BILITOT, EGFR  VTE Pharmacologic Prophylaxis: Sequential compression device (Venodyne)   VTE Mechanical Prophylaxis: sequential compression device

## 2018-02-07 NOTE — PHYSICAL THERAPY NOTE
Physical Therapy Progress Note     02/07/18 1520   Pain Assessment   Pain Assessment 0-10   Pain Score 3   Pain Type Acute pain   Pain Location Abdomen   Pain Orientation Bilateral   Hospital Pain Intervention(s) Repositioned; Ambulation/increased activity; Emotional support   Response to Interventions Tolerated  Restrictions/Precautions   Other Precautions Chair Alarm; Fall Risk;Multiple lines   Subjective   Subjective Nursing requesting assistance in order to return the pt  back to bed  The pt  notes that she is fatigued, but that she is doing alright  Bed Mobility   Sit to Supine 4  Minimal assistance   Additional items Assist x 1; Increased time required;Verbal cues;LE management  (via log-roll technique  )   Transfers   Sit to Stand 4  Minimal assistance   Additional items Assist x 1   Stand to Sit 4  Minimal assistance   Additional items Assist x 1   Ambulation/Elevation   Gait pattern Excessively slow; Step to;Short stride; Inconsistent bree; Shuffling;Decreased foot clearance; Forward Flexion   Gait Assistance 4  Minimal assist   Additional items Assist x 1;Verbal cues   Assistive Device Rolling walker   Distance 5 feet  Balance   Static Sitting Fair +   Dynamic Sitting Fair   Static Standing Poor +   Ambulatory Poor +   Activity Tolerance   Activity Tolerance Patient limited by pain; Patient tolerated treatment well;Patient limited by fatigue   Nurse Made Aware Tony Hankins RN  Assessment   Prognosis Good   Problem List Decreased strength;Decreased range of motion;Decreased endurance; Impaired balance;Decreased mobility; Decreased coordination;Decreased cognition; Impaired judgement;Decreased safety awareness;Decreased skin integrity   Assessment The pt  fatigued after sitting out in the chair, and wanting to return to bed  She required less assistance in order to transfer from the chair, and she was able to ambulate a short distance back to the bed   She did fatigue easily, and she required to sit after a few feet  She was instructed in the log-roll technique which she completed with assistance  The pt  does continue to remain limited, and she will benefit from inpatient rehab at discharge  Barriers to Discharge Inaccessible home environment;Decreased caregiver support   Goals   Patient Goals To return to bed  STG Expiration Date 02/21/18   Treatment Day 3   Plan   Treatment/Interventions Functional transfer training;LE strengthening/ROM; Therapeutic exercise; Endurance training;Patient/family training;Bed mobility;Gait training   Progress Slow progress, decreased activity tolerance   PT Frequency 5x/wk   Recommendation   Recommendation Post acute IP rehab   Equipment Recommended Wheelchair;Walker     Douglas Quiñones Flow, PTA

## 2018-02-07 NOTE — PROGRESS NOTES
Progress Note - Infectious Disease   Ghassan Franklin 36 y o  female MRN: [id number]  Unit/Bed#: [facility] 909-01 Encounter: [id number]      Impression/Recommendations:  1   Intra-abdominal/pelvic abscesses   Patient is status post repeat abdominal washout   She has multiple drains in place   She is status post placement of drainage in a new perisplenic collection   She is clinically well and systemically stable  Operative culture from [facility], growing only ampicillin susceptible Enterococcus   Operative culture here also growing  only ampicillin susceptible Enterococcus   Latest culture also grew Saccharomyces, most likely colonization   Repeat CT from 2/4 showed improving collection  Continue with IV Unasyn/fluconazole  Follow-up on final culture of new perisplenic collection      2   Possible infected  shunt   Given presence of tip of  shunt in peritoneal space, there is high probability of  shunt infection   Fortunately, patient has no symptoms concerning for meningitis   She has neurological deficits   She is status post tapping of  shunt at [facility]   CSF culture there had no growth but patient had prior antibiotic  Alicia Orta will go ahead and treat her for possible/presumptive  shunt infection   Patient is now status post  shunt resection   CSF culture here also negative   With removal shunt, antibiotic course can be decreased to 2-3 weeks     Antibiotic plan as in above  Likely treat with IV antibiotic x 2 post VPS resection      3   Gastric perforation, status post repair, with persistent leak   She is now status post placement of esophageal stent   Repeat video barium swallow from last week with no further leakage   Unfortunately, repeat abdomen/pelvis CT showed recurrent leak with new perisplenic collection   She is now status post placement of duodenal feeding tube and drainage of the perisplenic collection   However, both Critical Care service and GI service feel that patient should stay on TPN and not use feeding tube for enteral feeding   Repeat CT from yesterday showed no further leak  Monitor for recurrent leak  Patient will most likely need a repeat barium swallow down the line      4   Bilateral pleural effusion, status post chest tube placement   This is most likely sympathetic effusion, secondary to intra-abdominal abscesses  Chest tubes have been removed      5  Recurrent leukocytosis   WBC still elevated but stable   Repeat CT with improving collections and no new collection  Antibiotic plan as in above  Monitor WBC and temperature      6  Septic shock, on presentation at [facility]   This is secondary to gastric perforation   Patient is now hemodynamically stable   All cultures from [facility] obtained and reviewed, now on chart   All blood cultures were negative   Urine culture negative   CSF culture negative   Operative culture positive for Enterococcus, as in above  Antibiotic plan as in above  Monitor hemodynamics      Discussed with the patient in detail regarding above plan      Antibiotics:  Unasyn  Fluconazole # 9  POD # 13  Post VPS extraction # 2     Subjective:  Patient is out of ICU  Her abdominal pain is improving and controlled     Mild headache  Mild chest wall pain  She is awake and alert  Temperature is down   No chills  She is tolerating antibiotic well   No nausea, vomiting or diarrhea  Objective:  Vitals:  HR:  [] 95  Resp:  [16-33] 16  BP: (126-169)/(76-98) 169/86  SpO2:  [94 %-96 %] 96 %  Temp (24hrs), Av 6 °F (37 °C), Min:98 1 °F (36 7 °C), Max:99 2 °F (37 3 °C)  Current: Temperature: 98 1 °F (36 7 °C)    Physical Exam:     General: Awake, alert, cooperative, no distress  Head:  Incisions clean  No drainage  No erythema/warmth  Minimal tenderness  Chest:  Old  S site clean  No drainage  No erythema/warmth  No tenderness     Lungs: Expansion symmetric, no rales, no wheezing, respirations unlabored  Heart[de-identified]  Tachycardic with regular rhythm, S1 and S2 normal, no murmur  Abdomen: Soft, mildly distended  Incision clean with staples in place  Mild incisional tenderness  Drains still serial purulent but decreased in amount  Extremities: Trace edema  No erythema/warmth  No ulcer  Nontender to palpation  Skin:  No rash  Invasive Devices     Peripherally Inserted Central Catheter Line            PICC Line 56/16/25 Right Basilic 17 days          Drain            Closed/Suction Drain Left LLQ Bulb 19 Fr  12 days    Closed/Suction Drain Left LUQ Bulb 19 Fr  12 days    Closed/Suction Drain Right RLQ Bulb 19 Fr  12 days    Closed/Suction Drain Right RUQ Accordion 19 Fr  12 days    Closed/Suction Drain Left Back Bulb 12 Fr  7 days    NG/OG/Enteral Tube Enteral Feeding Tube Right nares 7 days                Labs studies:   I have personally reviewed pertinent labs  Results from last 7 days  Lab Units 02/05/18 0524 02/04/18  0535 02/03/18  0438 02/02/18  0415   SODIUM mmol/L 135* 136 139 139   POTASSIUM mmol/L 3 6 3 9 4 1 3 6   CHLORIDE mmol/L 97* 97* 100 101   CO2 mmol/L 34* 35* 35* 35*   ANION GAP mmol/L 4 4 4 3*   BUN mg/dL 17 18 19 18   CREATININE mg/dL 0 28* 0 30* 0 27* 0 27*   EGFR ml/min/1 73sq m 144 141 146 146   GLUCOSE RANDOM mg/dL 137 144* 139 159*   CALCIUM mg/dL 8 5 8 7 8 6 9 0   AST U/L  --   --   --  15   ALT U/L  --   --   --  15   ALK PHOS U/L  --   --   --  104   TOTAL PROTEIN g/dL  --   --   --  4 9*   BILIRUBIN TOTAL mg/dL  --   --   --  0 38       Results from last 7 days  Lab Units 02/05/18  0524 02/04/18  0535 02/03/18  0438   WBC Thousand/uL 26 68* 31 47* 25 55*   HEMOGLOBIN g/dL 9 0* 9 5* 9 0*   PLATELETS Thousands/uL 640* 731* 648*       Results from last 7 days  Lab Units 02/05/18  1215   GRAM STAIN RESULT  No Polys or Bacteria seen   WOUND CULTURE  No growth       Imaging Studies:   I have personally reviewed pertinent imaging study reports and images in PACS      EKG, Pathology, and Other Studies:   I have personally reviewed pertinent reports

## 2018-02-07 NOTE — PLAN OF CARE
Problem: PHYSICAL THERAPY ADULT  Goal: Performs mobility at highest level of function for planned discharge setting  See evaluation for individualized goals  Treatment/Interventions: Functional transfer training, LE strengthening/ROM, Therapeutic exercise, Endurance training, Bed mobility, Spoke to nursing  Equipment Recommended:  (R/O LEAST RESTRICTIVE AD )       See flowsheet documentation for full assessment, interventions and recommendations  Outcome: Progressing  Prognosis: Good  Problem List: Decreased strength, Decreased range of motion, Decreased endurance, Impaired balance, Decreased mobility, Decreased coordination, Decreased cognition, Impaired judgement, Decreased safety awareness, Decreased skin integrity  Assessment: The pt  fatigued after sitting out in the chair, and wanting to return to bed  She required less assistance in order to transfer from the chair, and she was able to ambulate a short distance back to the bed  She did fatigue easily, and she required to sit after a few feet  She was instructed in the log-roll technique which she completed with assistance  The pt  does continue to remain limited, and she will benefit from inpatient rehab at discharge  Barriers to Discharge: Inaccessible home environment, Decreased caregiver support     Recommendation: Post acute IP rehab     PT - OK to Discharge: (S) Yes (TO STR WHEN MED FREDERIC )    See flowsheet documentation for full assessment

## 2018-02-08 PROBLEM — F43.21 ADJUSTMENT DISORDER WITH DEPRESSED MOOD: Status: RESOLVED | Noted: 2018-02-08 | Resolved: 2018-02-08

## 2018-02-08 PROBLEM — F43.21 ADJUSTMENT DISORDER WITH DEPRESSED MOOD: Status: ACTIVE | Noted: 2018-02-08

## 2018-02-08 LAB
ANION GAP SERPL CALCULATED.3IONS-SCNC: 6 MMOL/L (ref 4–13)
BACTERIA WND AEROBE CULT: NO GROWTH
BASOPHILS # BLD AUTO: 0.08 THOUSANDS/ΜL (ref 0–0.1)
BASOPHILS NFR BLD AUTO: 0 % (ref 0–1)
BUN SERPL-MCNC: 18 MG/DL (ref 5–25)
CALCIUM SERPL-MCNC: 8.5 MG/DL (ref 8.3–10.1)
CHLORIDE SERPL-SCNC: 103 MMOL/L (ref 100–108)
CO2 SERPL-SCNC: 32 MMOL/L (ref 21–32)
CREAT SERPL-MCNC: 0.3 MG/DL (ref 0.6–1.3)
EOSINOPHIL # BLD AUTO: 0.2 THOUSAND/ΜL (ref 0–0.61)
EOSINOPHIL NFR BLD AUTO: 1 % (ref 0–6)
ERYTHROCYTE [DISTWIDTH] IN BLOOD BY AUTOMATED COUNT: 16.7 % (ref 11.6–15.1)
GFR SERPL CREATININE-BSD FRML MDRD: 141 ML/MIN/1.73SQ M
GLUCOSE SERPL-MCNC: 144 MG/DL (ref 65–140)
GLUCOSE SERPL-MCNC: 149 MG/DL (ref 65–140)
GLUCOSE SERPL-MCNC: 178 MG/DL (ref 65–140)
GLUCOSE SERPL-MCNC: 189 MG/DL (ref 65–140)
GLUCOSE SERPL-MCNC: 196 MG/DL (ref 65–140)
GLUCOSE SERPL-MCNC: 200 MG/DL (ref 65–140)
GRAM STN SPEC: NORMAL
HCT VFR BLD AUTO: 30.5 % (ref 34.8–46.1)
HGB BLD-MCNC: 9.7 G/DL (ref 11.5–15.4)
LYMPHOCYTES # BLD AUTO: 2.31 THOUSANDS/ΜL (ref 0.6–4.47)
LYMPHOCYTES NFR BLD AUTO: 10 % (ref 14–44)
MAGNESIUM SERPL-MCNC: 2.2 MG/DL (ref 1.6–2.6)
MCH RBC QN AUTO: 30 PG (ref 26.8–34.3)
MCHC RBC AUTO-ENTMCNC: 31.8 G/DL (ref 31.4–37.4)
MCV RBC AUTO: 94 FL (ref 82–98)
MONOCYTES # BLD AUTO: 1.42 THOUSAND/ΜL (ref 0.17–1.22)
MONOCYTES NFR BLD AUTO: 6 % (ref 4–12)
NEUTROPHILS # BLD AUTO: 18.01 THOUSANDS/ΜL (ref 1.85–7.62)
NEUTS SEG NFR BLD AUTO: 83 % (ref 43–75)
NRBC BLD AUTO-RTO: 0 /100 WBCS
PHOSPHATE SERPL-MCNC: 3.4 MG/DL (ref 2.7–4.5)
PLATELET # BLD AUTO: 651 THOUSANDS/UL (ref 149–390)
PMV BLD AUTO: 9.6 FL (ref 8.9–12.7)
POTASSIUM SERPL-SCNC: 3.7 MMOL/L (ref 3.5–5.3)
RBC # BLD AUTO: 3.23 MILLION/UL (ref 3.81–5.12)
SODIUM SERPL-SCNC: 141 MMOL/L (ref 136–145)
WBC # BLD AUTO: 22.2 THOUSAND/UL (ref 4.31–10.16)

## 2018-02-08 PROCEDURE — 82948 REAGENT STRIP/BLOOD GLUCOSE: CPT

## 2018-02-08 PROCEDURE — 97535 SELF CARE MNGMENT TRAINING: CPT

## 2018-02-08 PROCEDURE — C9113 INJ PANTOPRAZOLE SODIUM, VIA: HCPCS | Performed by: NURSE PRACTITIONER

## 2018-02-08 PROCEDURE — 99233 SBSQ HOSP IP/OBS HIGH 50: CPT | Performed by: INTERNAL MEDICINE

## 2018-02-08 PROCEDURE — 83735 ASSAY OF MAGNESIUM: CPT | Performed by: SURGERY

## 2018-02-08 PROCEDURE — 85025 COMPLETE CBC W/AUTO DIFF WBC: CPT | Performed by: SURGERY

## 2018-02-08 PROCEDURE — 80048 BASIC METABOLIC PNL TOTAL CA: CPT | Performed by: SURGERY

## 2018-02-08 PROCEDURE — 84100 ASSAY OF PHOSPHORUS: CPT | Performed by: SURGERY

## 2018-02-08 PROCEDURE — 99251 PR INITL INPATIENT CONSULT NEW/ESTAB PT 20 MIN: CPT | Performed by: PSYCHIATRY & NEUROLOGY

## 2018-02-08 PROCEDURE — 97110 THERAPEUTIC EXERCISES: CPT

## 2018-02-08 PROCEDURE — 97116 GAIT TRAINING THERAPY: CPT

## 2018-02-08 PROCEDURE — 97530 THERAPEUTIC ACTIVITIES: CPT

## 2018-02-08 RX ORDER — ONDANSETRON 2 MG/ML
4 INJECTION INTRAMUSCULAR; INTRAVENOUS EVERY 4 HOURS PRN
Status: DISCONTINUED | OUTPATIENT
Start: 2018-02-08 | End: 2018-03-09 | Stop reason: HOSPADM

## 2018-02-08 RX ADMIN — INSULIN LISPRO 1 UNITS: 100 INJECTION, SOLUTION INTRAVENOUS; SUBCUTANEOUS at 23:54

## 2018-02-08 RX ADMIN — MIRTAZAPINE 15 MG: 15 TABLET, ORALLY DISINTEGRATING ORAL at 22:31

## 2018-02-08 RX ADMIN — THIAMINE HYDROCHLORIDE: 100 INJECTION, SOLUTION INTRAMUSCULAR; INTRAVENOUS at 21:18

## 2018-02-08 RX ADMIN — INSULIN LISPRO 1 UNITS: 100 INJECTION, SOLUTION INTRAVENOUS; SUBCUTANEOUS at 00:39

## 2018-02-08 RX ADMIN — HEPARIN SODIUM 5000 UNITS: 5000 INJECTION, SOLUTION INTRAVENOUS; SUBCUTANEOUS at 09:53

## 2018-02-08 RX ADMIN — ALTEPLASE 2 MG: 2.2 INJECTION, POWDER, LYOPHILIZED, FOR SOLUTION INTRAVENOUS at 21:19

## 2018-02-08 RX ADMIN — Medication 3 G: at 21:06

## 2018-02-08 RX ADMIN — Medication 3 G: at 14:12

## 2018-02-08 RX ADMIN — GABAPENTIN 300 MG: 250 SOLUTION ORAL at 22:40

## 2018-02-08 RX ADMIN — Medication 3 G: at 02:02

## 2018-02-08 RX ADMIN — Medication 3 G: at 09:53

## 2018-02-08 RX ADMIN — NORTRIPTYLINE HYDROCHLORIDE 10 MG: 10 CAPSULE ORAL at 17:08

## 2018-02-08 RX ADMIN — METOPROLOL TARTRATE 10 MG: 1 INJECTION, SOLUTION INTRAVENOUS at 04:06

## 2018-02-08 RX ADMIN — METOPROLOL TARTRATE 10 MG: 1 INJECTION, SOLUTION INTRAVENOUS at 09:53

## 2018-02-08 RX ADMIN — METOPROLOL TARTRATE 10 MG: 1 INJECTION, SOLUTION INTRAVENOUS at 22:41

## 2018-02-08 RX ADMIN — INSULIN LISPRO 1 UNITS: 100 INJECTION, SOLUTION INTRAVENOUS; SUBCUTANEOUS at 06:23

## 2018-02-08 RX ADMIN — HEPARIN SODIUM 5000 UNITS: 5000 INJECTION, SOLUTION INTRAVENOUS; SUBCUTANEOUS at 22:43

## 2018-02-08 RX ADMIN — PANTOPRAZOLE SODIUM 40 MG: 40 INJECTION, POWDER, FOR SOLUTION INTRAVENOUS at 09:53

## 2018-02-08 RX ADMIN — INSULIN LISPRO 1 UNITS: 100 INJECTION, SOLUTION INTRAVENOUS; SUBCUTANEOUS at 12:05

## 2018-02-08 RX ADMIN — FLUCONAZOLE 400 MG: 2 INJECTION INTRAVENOUS at 17:08

## 2018-02-08 RX ADMIN — METOPROLOL TARTRATE 10 MG: 1 INJECTION, SOLUTION INTRAVENOUS at 17:08

## 2018-02-08 NOTE — SOCIAL WORK
Cm reviewed patient during care coordination rounds  Cm met with patient to review discharge planning  Per patient and mother they understand the options, but would [facility]  Cm explained to both patient and mother, that patient would have to demonstrate ability to safely discharge home with the support and care of her mother in the home

## 2018-02-08 NOTE — PLAN OF CARE
Problem: PHYSICAL THERAPY ADULT  Goal: Performs mobility at highest level of function for planned discharge setting  See evaluation for individualized goals  Treatment/Interventions: Functional transfer training, LE strengthening/ROM, Therapeutic exercise, Endurance training, Bed mobility, Spoke to nursing  Equipment Recommended:  (R/O LEAST RESTRICTIVE AD )       See flowsheet documentation for full assessment, interventions and recommendations  Prognosis: Good  Problem List: Decreased strength, Decreased range of motion, Decreased endurance, Impaired balance, Decreased mobility, Decreased coordination, Decreased cognition, Impaired judgement, Decreased safety awareness, Decreased skin integrity  Assessment: pt progressed significantly w mob and activity tolerance; requires mod assist w transf and amb 10 ft 15 ft using RW, additional assist required w chair follow for safety; gait w inconsistent bree, decreased step length, foot clearance; walker reliant + falls risk; fatigued w exertion; pt needs much encouragement to progress and increase activity levels and tolerance OOB; rec cont PT IP rehab when med cleared  Barriers to Discharge: Inaccessible home environment, Decreased caregiver support     Recommendation: Post acute IP rehab     PT - OK to Discharge: Yes    See flowsheet documentation for full assessment

## 2018-02-08 NOTE — PROGRESS NOTES
Progress Note - Infectious Disease   Melvin Arrow 36 y o  female MRN: [id number]  Unit/Bed#: [facility] 909-01 Encounter: [id number]      Impression/Recommendations:  1   Intra-abdominal/pelvic abscesses   Patient is status post repeat abdominal washout   She has multiple drains in place   She is status post placement of drainage in a new perisplenic collection   She is clinically well and systemically stable  Operative culture from [facility], growing only ampicillin susceptible Enterococcus   Operative culture here also growing  only ampicillin susceptible Enterococcus   Latest culture also grew Saccharomyces, most likely colonization   Repeat CT from 2/4 showed improving collection  Continue with IV Unasyn/fluconazole  Continue drainage      2   Possible infected  shunt   Given presence of tip of  shunt in peritoneal space, there is high probability of  shunt infection   Fortunately, patient has no symptoms concerning for meningitis   She has neurological deficits   She is status post tapping of  shunt at [facility]   CSF culture there had no growth but patient had prior antibiotic  Sherlon Seek will go ahead and treat her for possible/presumptive  shunt infection   Patient is now status post  shunt resection   CSF culture here also negative   With removal shunt, antibiotic course can be decreased to 2-3 weeks     Antibiotic plan as in above  Treat x 2 post VPS resection      3   Gastric perforation, status post repair, with persistent leak   She is now status post placement of esophageal stent   Repeat video barium swallow from last week with no further leakage   Unfortunately, repeat abdomen/pelvis CT showed recurrent leak with new perisplenic collection   She is now status post placement of duodenal feeding tube and drainage of the perisplenic collection   However, both Critical Care service and GI service feel that patient should stay on TPN and not use feeding tube for enteral feeding   Repeat CT showed no further leak  Monitor for recurrent leak  Patient will most likely need a repeat barium swallow down the line      4   Bilateral pleural effusion, status post chest tube placement   This is most likely sympathetic effusion, secondary to intra-abdominal abscesses  Chest tubes have been removed      5  Recurrent leukocytosis   WBC still elevated but slowly decreasing   Repeat CT with improving collections and no new collection  Antibiotic plan as in above  Monitor WBC and temperature      6  Septic shock, on presentation at [facility]   This is secondary to gastric perforation   Patient is now hemodynamically stable   All cultures from [facility] obtained and reviewed, now on chart   All blood cultures were negative   Urine culture negative   CSF culture negative   Operative culture positive for Enterococcus, as in above  Antibiotic plan as in above  Monitor hemodynamics      Discussed with the patient and her fiance in detail regarding above plan      Antibiotics:  Unasyn  Fluconazole # 10  POD # 14  Post VPS extraction # 3     Subjective:  Patient is comfortable  Abdominal pain controlled and improving     Mild and improving headache  Mild chest wall pain  She is awake and alert  Temperature stays  down   No chills  She is tolerating antibiotic well   No nausea, vomiting or diarrhea  Objective:  Vitals:  HR:  [81-96] 87  Resp:  [16-18] 18  BP: (149-175)/(57-99) 149/72  SpO2:  [93 %-96 %] 96 %  Temp (24hrs), Av 6 °F (37 °C), Min:98 2 °F (36 8 °C), Max:99 3 °F (37 4 °C)  Current: Temperature: 98 4 °F (36 9 °C)    Physical Exam:     General: Awake, alert, cooperative, no distress  Head:  Incision clean  No drainage  No erythema/warmth  No tenderness  Chest:  Wound healing  No erythema/warmth  No drainage  No tenderness      Lungs: Expansion symmetric, no rales, no wheezing, respirations unlabored     Heart[de-identified]  Regular rate and rhythm, S1 and S2 normal, no murmur  Abdomen: Soft, mildly distended  Incision clean  No erythema  Mild tenderness  Drain seropurulent  Extremities: No edema  No erythema/warmth  No ulcer  Nontender to palpation  Skin:  No rash  Invasive Devices     Peripherally Inserted Central Catheter Line            PICC Line 83/77/21 Right Basilic 18 days          Drain            Closed/Suction Drain Left LLQ Bulb 19 Fr  13 days    Closed/Suction Drain Left LUQ Bulb 19 Fr  13 days    Closed/Suction Drain Right RLQ Bulb 19 Fr  13 days    Closed/Suction Drain Right RUQ Accordion 19 Fr  13 days    Closed/Suction Drain Left Back Bulb 12 Fr  8 days    NG/OG/Enteral Tube Enteral Feeding Tube Right nares 8 days                Labs studies:   I have personally reviewed pertinent labs  Results from last 7 days  Lab Units 02/08/18  0452 02/05/18  0524 02/04/18  0535  02/02/18  0415   SODIUM mmol/L 141 135* 136  < > 139   POTASSIUM mmol/L 3 7 3 6 3 9  < > 3 6   CHLORIDE mmol/L 103 97* 97*  < > 101   CO2 mmol/L 32 34* 35*  < > 35*   ANION GAP mmol/L 6 4 4  < > 3*   BUN mg/dL 18 17 18  < > 18   CREATININE mg/dL 0 30* 0 28* 0 30*  < > 0 27*   EGFR ml/min/1 73sq m 141 144 141  < > 146   GLUCOSE RANDOM mg/dL 149* 137 144*  < > 159*   CALCIUM mg/dL 8 5 8 5 8 7  < > 9 0   AST U/L  --   --   --   --  15   ALT U/L  --   --   --   --  15   ALK PHOS U/L  --   --   --   --  104   TOTAL PROTEIN g/dL  --   --   --   --  4 9*   BILIRUBIN TOTAL mg/dL  --   --   --   --  0 38   < > = values in this interval not displayed  Results from last 7 days  Lab Units 02/08/18  0452 02/05/18  0524 02/04/18  0535   WBC Thousand/uL 22 20* 26 68* 31 47*   HEMOGLOBIN g/dL 9 7* 9 0* 9 5*   PLATELETS Thousands/uL 651* 640* 731*       Results from last 7 days  Lab Units 02/05/18  1215   GRAM STAIN RESULT  No Polys or Bacteria seen   WOUND CULTURE  No growth       Imaging Studies:   I have personally reviewed pertinent imaging study reports and images in PACS      EKG, Pathology, and Other Studies:   I have personally reviewed pertinent reports

## 2018-02-08 NOTE — PLAN OF CARE
Problem: OCCUPATIONAL THERAPY ADULT  Goal: Performs self-care activities at highest level of function for planned discharge setting  See evaluation for individualized goals  Treatment Interventions: ADL retraining, Functional transfer training, Endurance training, Patient/family training, Equipment evaluation/education, Compensatory technique education, Activityengagement          See flowsheet documentation for full assessment, interventions and recommendations  Outcome: Progressing  Limitation: Decreased ADL status, Decreased self-care trans, Decreased high-level ADLs, Decreased endurance  Prognosis: Good  Assessment: PT SEEN FOR OT TREATMENT SESSION FOCUSING ON LB DRESSING, BED MOBILITY, FUNCTIONAL TRANSFERS AND OVERALL ACTIVITY TOLERANCE  PT COMPLETED LB DRESSING, INCLUDING DONNINF B/L SOCKS WHILE SUPINE IN BED WITH HOB ELEVATED  IT IS BELIEVED PT WOULD REQUIRE ADDITIONAL ASSISTANCE FOR OTHER LB DRESSING OR IF COMPLETING LB DRESSING SITTING EOB/STANDING  PT REQUIRED MOD A X2 FOR SUPINE ->SIT TRANSFERS  INITIALLY PT REQUIRED MOD A X2 FOR ATTEMPTED SIT<->STAND TRANSFER  PT REEDUCATED ON SAFE TRANSFER TECHNIQUE/PROPER USE OF RW  FOLLOWING EDUCATION, PT AT MOD A X1 FOR SIT<->STAND TRANSFER  MOD A FOR LIMITED MOBILITY FROM BED->CHAIR W/ USE OF RW W/ ASSIST OF 2ND PERSON FOR LINE MANAGEMENT  PT IMPUSLIVE T/O TRANSFER, STATING "I JUST NEED TO GET TO THE CHAIR, MY LEGS ARE SO HEAVY"  PT EDUCATED ON THE IMPORTANCE OF PACING ACTIVITY AND SAFETY T/O  ATTEMPTED TO DISCUSSED COPING/LEISURE ACTIVITY 2' PT PRESENTING WITH FLAT AFFECT/QUIET TONE T/O HOWEVER PT STATED "I JUST WANT TO GET BETTER AND GET OUT OF HERE!" PT IS PROGRESSING, HOWEVER, REMAINS LIMITED  CONT TO RECOMMEND INPT REHAB AT THIS TIME  WILL CONT TO FOLLOW TO ADDRESS THE PREVIOUS DESCRIEBD GOALS WITH THE BELOW GOALS ADDED TO PT'S TX PLAN      Recommendation:  (PT MAY BENEFIT FROM NEUROPSYCH CONSULT )  OT Discharge Recommendation: Short Term Rehab  OT - OK to Discharge: Yes (TO INTP [facility] )

## 2018-02-08 NOTE — PLAN OF CARE
Problem: Prexisting or High Potential for Compromised Skin Integrity  Goal: Skin integrity is maintained or improved  INTERVENTIONS:  - Identify patients at risk for skin breakdown  - Assess and monitor skin integrity  - Assess and monitor nutrition and hydration status  - Monitor labs (i e  albumin)  - Assess for incontinence   - Turn and reposition patient  - Assist with mobility/ambulation  - Relieve pressure over bony prominences  - Avoid friction and shearing  - Provide appropriate hygiene as needed including keeping skin clean and dry  - Evaluate need for skin moisturizer/barrier cream  - Collaborate with interdisciplinary team (i e  Nutrition, Rehabilitation, etc )   - Patient/family teaching   Outcome: Progressing      Problem: Potential for Falls  Goal: Patient will remain free of falls  INTERVENTIONS:  - Assess patient frequently for physical needs  -  Identify cognitive and physical deficits and behaviors that affect risk of falls  -  [place] fall precautions as indicated by assessment   - Educate patient/family on patient safety including physical limitations  - Instruct patient to call for assistance with activity based on assessment  - Modify environment to reduce risk of injury  - Consider OT/PT consult to assist with strengthening/mobility   Outcome: Progressing      Problem: NEUROSENSORY - ADULT  Goal: Achieves stable or improved neurological status  INTERVENTIONS  - Monitor and report changes in neurological status  - Initiate measures to prevent increased intracranial pressure  - Maintain blood pressure and fluid volume within ordered parameters to optimize cerebral perfusion  - Monitor temperature, glucose, and sodium or any other associated labs   Initiate appropriate interventions as ordered  - Monitor for seizure activity   - Administer anti-seizure medications as ordered   Outcome: Progressing    Goal: Achieves maximal functionality and self care  INTERVENTIONS  - Monitor swallowing and airway patency with patient fatigue and changes in neurological status  - Encourage and assist patient to increase activity and self care with guidance from rehab services  - Encourage visually impaired, hearing impaired and aphasic patients to use assistive/communication devices   Outcome: Progressing      Problem: CARDIOVASCULAR - ADULT  Goal: Maintains optimal cardiac output and hemodynamic stability  INTERVENTIONS:  - Monitor I/O, vital signs and rhythm  - Monitor for S/S and trends of decreased cardiac output i e  bleeding, hypotension  - Administer and titrate ordered vasoactive medications to optimize hemodynamic stability  - Assess quality of pulses, skin color and temperature  - Assess for signs of decreased coronary artery perfusion - ex   Angina  - Instruct patient to report change in severity of symptoms   Outcome: Progressing    Goal: Absence of cardiac dysrhythmias or at baseline rhythm  INTERVENTIONS:  - Continuous cardiac monitoring, monitor vital signs, obtain 12 lead EKG if indicated  - Administer antiarrhythmic and heart rate control medications as ordered  - Monitor electrolytes and administer replacement therapy as ordered   Outcome: Progressing      Problem: RESPIRATORY - ADULT  Goal: Achieves optimal ventilation and oxygenation  INTERVENTIONS:  - Assess for changes in respiratory status  - Assess for changes in mentation and behavior  - Position to facilitate oxygenation and minimize respiratory effort  - Oxygen administration by appropriate delivery method based on oxygen saturation (per order) or ABGs  - Initiate smoking cessation education as indicated  - Encourage broncho-pulmonary hygiene including cough, deep breathe, Incentive Spirometry  - Assess the need for suctioning and aspirate as needed  - Assess and instruct to report SOB or any respiratory difficulty  - Respiratory Therapy support as indicated   Outcome: Progressing      Problem: GASTROINTESTINAL - ADULT  Goal: Minimal or absence of nausea and/or vomiting  INTERVENTIONS:  - Administer IV fluids as ordered to ensure adequate hydration  - Maintain NPO status until nausea and vomiting are resolved  - Nasogastric tube as ordered  - Administer ordered antiemetic medications as needed  - Provide nonpharmacologic comfort measures as appropriate  - Advance diet as tolerated, if ordered  - Nutrition services referral to assist patient with adequate nutrition and appropriate food choices   Outcome: Progressing    Goal: Maintains or returns to baseline bowel function  INTERVENTIONS:  - Assess bowel function  - Encourage oral fluids to ensure adequate hydration  - Administer IV fluids as ordered to ensure adequate hydration  - Administer ordered medications as needed  - Encourage mobilization and activity  - Nutrition services referral to assist patient with appropriate food choices   Outcome: Progressing    Goal: Maintains adequate nutritional intake  INTERVENTIONS:  - Monitor percentage of each meal consumed  - Identify factors contributing to decreased intake, treat as appropriate  - Assist with meals as needed  - Monitor I&O, WT and lab values  - Obtain nutrition services referral as needed   Outcome: Progressing    Goal: Establish and maintain optimal ostomy function  INTERVENTIONS:  - Assess bowel function  - Encourage oral fluids to ensure adequate hydration  - Administer IV fluids as ordered to ensure adequate hydration  - Administer ordered medications as needed  - Encourage mobilization and activity  - Nutrition services referral to assist patient with appropriate food choices  - Assess stoma site   Outcome: Progressing      Problem: GENITOURINARY - ADULT  Goal: Maintains or returns to baseline urinary function  INTERVENTIONS:  - Assess urinary function  - Encourage oral fluids to ensure adequate hydration  - Administer IV fluids as ordered to ensure adequate hydration  - Administer ordered medications as needed  - Offer frequent toileting  - Follow urinary retention protocol if ordered   Outcome: Progressing    Goal: Absence of urinary retention  INTERVENTIONS:  - Assess patient's ability to void and empty bladder  - Monitor I/O  - Bladder scan as needed  - Discuss with physician/AP medications to alleviate retention as needed  - Discuss catheterization for long term situations as appropriate   Outcome: Progressing    Goal: Urinary catheter remains patent  INTERVENTIONS:  - Assess patency of urinary catheter  - If patient has a chronic lopez, consider changing catheter if non-functioning  - Follow guidelines for intermittent irrigation of non-functioning urinary catheter   Outcome: Progressing      Problem: METABOLIC, FLUID AND ELECTROLYTES - ADULT  Goal: Electrolytes maintained within normal limits  INTERVENTIONS:  - Monitor labs and assess patient for signs and symptoms of electrolyte imbalances  - Administer electrolyte replacement as ordered  - Monitor response to electrolyte replacements, including repeat lab results as appropriate  - Instruct patient on fluid and nutrition as appropriate   Outcome: Progressing    Goal: Fluid balance maintained  INTERVENTIONS:  - Monitor labs and assess for signs and symptoms of volume excess or deficit  - Monitor I/O and WT  - Instruct patient on fluid and nutrition as appropriate   Outcome: Progressing    Goal: Glucose maintained within target range  INTERVENTIONS:  - Monitor Blood Glucose as ordered  - Assess for signs and symptoms of hyperglycemia and hypoglycemia  - Administer ordered medications to maintain glucose within target range  - Assess nutritional intake and initiate nutrition service referral as needed   Outcome: Progressing      Problem: SKIN/TISSUE INTEGRITY - ADULT  Goal: Skin integrity remains intact  INTERVENTIONS  - Identify patients at risk for skin breakdown  - Assess and monitor skin integrity  - Assess and monitor nutrition and hydration status  - Monitor labs (i e  albumin)  - Assess for incontinence   - Turn and reposition patient  - Assist with mobility/ambulation  - Relieve pressure over bony prominences  - Avoid friction and shearing  - Provide appropriate hygiene as needed including keeping skin clean and dry  - Evaluate need for skin moisturizer/barrier cream  - Collaborate with interdisciplinary team (i e  Nutrition, Rehabilitation, etc )   - Patient/family teaching   Outcome: Progressing    Goal: Incision(s), wounds(s) or drain site(s) healing without S/S of infection  INTERVENTIONS  - Assess and document risk factors for skin impairment   - Assess and document dressing, incision, wound bed, drain sites and surrounding tissue  - Initiate Nutrition services consult and/or wound management as needed   Outcome: Progressing    Goal: Oral mucous membranes remain intact  INTERVENTIONS  - Assess oral mucosa and hygiene practices  - Implement preventative oral hygiene regimen  - Implement oral medicated treatments as ordered  - Initiate Nutrition services referral as needed   Outcome: Progressing      Problem: HEMATOLOGIC - ADULT  Goal: Maintains hematologic stability  INTERVENTIONS  - Assess for signs and symptoms of bleeding or hemorrhage  - Monitor labs  - Administer supportive blood products/factors as ordered and appropriate   Outcome: Progressing      Problem: MUSCULOSKELETAL - ADULT  Goal: Maintain or return mobility to safest level of function  INTERVENTIONS:  - Assess patient's ability to carry out ADLs; assess patient's baseline for ADL function and identify physical deficits which impact ability to perform ADLs (bathing, care of mouth/teeth, toileting, grooming, dressing, etc )  - Assess/evaluate cause of self-care deficits   - Assess range of motion  - Assess patient's mobility; develop plan if impaired  - Assess patient's need for assistive devices and provide as appropriate  - Encourage maximum independence but intervene and supervise when necessary  - Involve family in performance of ADLs  - Assess for home care needs following discharge   - Request OT consult to assist with ADL evaluation and planning for discharge  - Provide patient education as appropriate   Outcome: Progressing    Goal: Maintain proper alignment of affected body part  INTERVENTIONS:  - Support, maintain and protect limb and body alignment  - Provide pt/fam with appropriate education   Outcome: Progressing      Problem: Nutrition/Hydration-ADULT  Goal: Nutrient/Hydration intake appropriate for improving, restoring or maintaining nutritional needs  Monitor and assess patient's nutrition/hydration status for malnutrition (ex- brittle hair, bruises, dry skin, pale skin and conjunctiva, muscle wasting, smooth red tongue, and disorientation)  Collaborate with interdisciplinary team and initiate plan and interventions as ordered  Monitor patient's weight and dietary intake as ordered or per policy  Utilize nutrition screening tool and intervene per policy  Determine patient's food preferences and provide high-protein, high-caloric foods as appropriate       INTERVENTIONS:  - Monitor oral intake, urinary output, labs, and treatment plans  - Assess nutrition and hydration status and recommend course of action  - Evaluate amount of meals eaten  - Assist patient with eating if necessary   - Allow adequate time for meals  - Recommend/ encourage appropriate diets, oral nutritional supplements, and vitamin/mineral supplements  - Order, calculate, and assess calorie counts as needed  - Recommend, monitor, and adjust tube feedings and TPN/PPN based on assessed needs  - Assess need for intravenous fluids  - Provide specific nutrition/hydration education as appropriate  - Include patient/family/caregiver in decisions related to nutrition    Outcome: Progressing      Problem: DISCHARGE PLANNING - CARE MANAGEMENT  Goal: Discharge to post-acute care or home with appropriate resources  INTERVENTIONS:  - Conduct assessment to determine patient/family and health care team treatment goals, and need for post-acute services based on payer coverage, community resources, and patient preferences, and barriers to discharge  - Address psychosocial, clinical, and financial barriers to discharge as identified in assessment in conjunction with the patient/family and health care team  - Arrange appropriate level of post-acute services according to patient's   needs and preference and payer coverage in collaboration with the physician and health care team  - Communicate with and update the patient/family, physician, and health care team regarding progress on the discharge plan  - Arrange appropriate transportation to post-acute venues  - Pt to d/c with appropriate resources when medically stable     Outcome: Progressing

## 2018-02-08 NOTE — PROGRESS NOTES
Progress Note - General Surgery   Octavia Srinivasan 36 y o  female MRN: [id number]  Unit/Bed#: [facility] 909-01 Encounter: [id number]    Assessment:  36y o -year-old female with gastric perforation s/p hiatal hernia repair at outside hospital, s/p esophageal stent placement, exploratory laparotomy and washout  Has had persistent esophageal/gastric leak on imaging  Had bilateral pleural effusions which were drained  Perisplenic fluid collection was drained  - afebrile, tolerating TF's at 30, having BM       Plan:  - advance TF's to 40 today  - let TPN run out  - unasyn/diflucan per ID x2 weeks  - OOB/ambulate  - ketamine weaning per APS  - SQH/SCDs         Subjective/Objective   Chief Complaint:     Subjective: Passing flatus and having BM, worked with PT yesterday    Objective:     Blood pressure 166/91, pulse 94, temperature 98 7 °F (37 1 °C), temperature source Oral, resp  rate 18, height 5' 4" (1 626 m), weight 77 7 kg (171 lb 4 8 oz), SpO2 95 %, not currently breastfeeding  ,Body mass index is 29 4 kg/m²  Intake/Output Summary (Last 24 hours) at 02/08/18 0521  Last data filed at 02/08/18 0202   Gross per 24 hour   Intake           562 87 ml   Output             2945 ml   Net         -2382 13 ml       Invasive Devices     Peripherally Inserted Central Catheter Line            PICC Line 73/33/52 Right Basilic 18 days          Drain            Closed/Suction Drain Left LLQ Bulb 19 Fr  13 days    Closed/Suction Drain Left LUQ Bulb 19 Fr  13 days    Closed/Suction Drain Right RLQ Bulb 19 Fr  13 days    Closed/Suction Drain Right RUQ Accordion 19 Fr  13 days    Closed/Suction Drain Left Back Bulb 12 Fr  7 days    NG/OG/Enteral Tube Enteral Feeding Tube Right nares 7 days                Physical Exam:   NAD  RRR  Norm resp effort  Abd soft, stably distended, min tender around incision, ADENIKE x4 serosang  Perisplenic drain (LUQ) serous       Lab, Imaging and other studies:  I have personally reviewed pertinent lab results  , CBC:   No results found for: WBC, HGB, HCT, MCV, PLT, ADJUSTEDWBC, MCH, MCHC, RDW, MPV, NRBC, CMP:   No results found for: NA, K, CL, CO2, ANIONGAP, BUN, CREATININE, GLUCOSE, CALCIUM, AST, ALT, ALKPHOS, PROT, ALBUMIN, BILITOT, EGFR  VTE Pharmacologic Prophylaxis: Sequential compression device (Venodyne)   VTE Mechanical Prophylaxis: sequential compression device

## 2018-02-08 NOTE — CONSULTS
Consultation - 72 Chemanna Faulkner 36 y o  female MRN: [id number]  Unit/Bed#: [facility] 909-01 Encounter: [id number]      Chief Complaint:     History of Present Illness   Physician Requesting Consult: Shahab Jaimes MD  Reason for Consult / Principal Problem: depression     Inpatient consult to Psychiatry  Consult performed by: Prince Hammond  Consult ordered by: Dominga Choe is a 36 y o  female presents with significant medical issues including peritonitis and septic shock, history of gastric repair  She is s/p esophageal stent placement, ex lap and wash out  Today when I met with the patient, she informed me that she was sad and missed her kids  However, she denies depression, denies SI or history of HI or violence  She denies psychiatric history  She does not want to see a psychiatrist, and is not interested in mental health services at this time  I asked that she let us know if she changed her mind and she said she would  Her mother Merna Pallas was available to discuss as well, and agreed with the patient's sentiments       Psychiatric Review Of Systems:  Unable to obtain, patient did not want to discuss    Historical Information   Previous diagnoses include denies  Prior suicide attempts: no  Prior self harm: no  Prior violence or aggression: no  Prior outpatient psychiatric treatment: no  Prior therapy: no  Prior inpatient psychiatric treatment: no    Previous psychotropic medication trials:   denies    Substance Abuse History:  Social History     Tobacco History     Smoking Status  Never Smoker    Smokeless Tobacco Use  Never Used          Alcohol History     Alcohol Use Status  No          Drug Use     Drug Use Status  No          Sexual Activity     Sexually Active  Not Asked          Activities of Daily Living    Not Asked                 I am unable to assess the patient for substance use within the past 12 months as they are unable or unwilling to answer    Family Psychiatric History:   Psychiatric Illness:  Unable to obtain  Suicide Attempts:  Unable to obtain  Substance Abuse:  Unable to obtain    Social History  Unable to obtain due to patient not wanting to talk    Social History     Social History    Marital status: Single     Spouse name: N/A    Number of children: N/A    Years of education: N/A     Occupational History    Not on file  Social History Main Topics    Smoking status: Never Smoker    Smokeless tobacco: Never Used    Alcohol use No    Drug use: No    Sexual activity: Not on file     Other Topics Concern    Not on file     Social History Narrative    No narrative on file           Past Medical History:  History reviewed  No pertinent past medical history  Past Surgical History:   Procedure Laterality Date    LAPAROTOMY N/A 1/25/2018    Procedure: Exploratory Laparotomy, wash out,placement of drains, placement of NG feeding tube ; Surgeon: Cristian Harrison DO;  Location: [facility];  Service: General    NY Jinx Gus CSF SHUNT,W/O REPLACE Right 2/5/2018    Procedure: Removal of  shunt;  Surgeon: Roxy Donaldson MD;  Location[facility];  Service: Neurosurgery    [place] REPLACEMENT/REVISION,CSF SHUNT Right 1/25/2018    Procedure: Externalization of right-sided SHUNT VENTRICULAR-PERITONEAL in anterior chest wall ribs two and three level  ;  Surgeon: Morro Chaudhry MD;  Location: [facility];  Service: Neurosurgery       Medical Review Of Systems:  Patient admits to Review of systems not obtained due to patient factors      Meds/Allergies   all current active meds have been reviewed  Allergies   Allergen Reactions    Benadryl [Diphenhydramine] Swelling    Phenergan [Promethazine] Hives       Objective   Vital signs in last 24 hours:  Temp:  [98 2 °F (36 8 °C)-99 3 °F (37 4 °C)] 98 4 °F (36 9 °C)  HR:  [81-96] 87  Resp:  [16-18] 18  BP: (149-175)/(57-99) 149/72      Intake/Output Summary (Last 24 hours) at 02/08/18 2838  Last data filed at 02/08/18 1419   Gross per 24 hour   Intake                0 ml   Output             1992 ml   Net            -1992 ml       Mental Status Evaluation:  Appearance:  age appropriate, dressed in hospital attire   Behavior:  pleasant   Speech:  normal rate, slow   Mood:  dysphoric   Affect:  constricted   Language: Grossly normal   Thought Process:  organized, logical, coherent   Associations: intact associations   Thought Content:  normal   Perceptual Disturbances: none   Risk Potential: Suicidal ideation - None  Homicidal ideation - None  Potential for aggression - No   Sensorium:  Grossly oriented   Memory:  recent and remote memory grossly intact   Consciousness:  alert   Attention: attention span and concentration are age appropriate   Intellect: not examined   Fund of Knowledge: awareness of current events: yes   Insight:  age appropriate   Judgment: age appropriate   Muscle Strength Muscle Tone: normal  normal   Gait/Station: in bed   Motor Activity: no abnormal movements     Lab Results: I have personally reviewed all pertinent laboratory/tests results  Code Status: Level 1 - Full Code  Advance Directive and Living Will: <no information>    Assessment/Plan     Principal Problem:    Gastric leak  Active Problems:    Acute peritonitis    Idiopathic intracranial hypertension    S/P  shunt     (ventriculoperitoneal) shunt status    Murmur, cardiac    Refusal of blood product    Gastroesophageal reflux disease    Choroidal nevus, right eye      Assessment:  Leslie Mcfarland is a 36 y o  female presents with medical conditions that cause fatigue and hospitalization that causes sadness  Denies safety risk, does not want psychiatric involvement but state she will let team know if she changes her mind      Barriers, stressors, and challenges include medical illness    Consult Diagnosis:  Adjustment disorder with depressed mood    Current Treatment:    Current Facility-Administered Medications:  acetaminophen 325 mg Rectal Q4H PRN Akhil Hood MD    Adult TPN (CUSTOM BASE/CUSTOM ELECTROLYTE)  Intravenous Continuous Tania Cosby MD Last Rate: 58 7 mL/hr at 02/07/18 2233   Adult TPN (CUSTOM BASE/CUSTOM ELECTROLYTE)  Intravenous Continuous Rodolfo Azar MD    ampicillin-sulbactam 3 g Intravenous Q6H Carlita Rae MD Last Rate: 3 g (02/08/18 1412)   bisacodyl 10 mg Rectal Daily PRN Akhil Hood MD    fluconazole 400 mg Intravenous Q24H ABNER Garrison Last Rate: 400 mg (02/07/18 1706)   gabapentin 300 mg Oral HS John Givens MD    heparin (porcine) 5,000 Units Subcutaneous Q12H [facility] John Givens MD    HYDROmorphone 0 5 mg Intravenous Q3H PRN Rosy Spangler PA-C    HYDROmorphone 1 mg Intravenous Q3H PRN Mariah Toussaint MD    insulin lispro 1-5 Units Subcutaneous Q6H [facility] Nayla Mcconnell PA-C    iohexol 50 mL Oral 90 min pre-procedure Cristofer Dong MD    metoprolol 10 mg Intravenous Q6H John Givens MD    mirtazapine 15 mg Oral HS Nayla Mcconnell PA-C    nortriptyline 10 mg Oral BID John Givens MD    pantoprazole 40 mg Intravenous Q24H [facility] and Recommendations:   1) No psychiatric involvement desired by patient  2) No SI, denies h/o HI or violence   Patient does not appear to be a danger to self   3) Psychiatry will sign off    Risks, benefits and possible side effects of Medications:   N/A    Thank you for the opportunity to consult on this individual     Cherisse Schlatter III, DO  2/8/2018  2:49 PM

## 2018-02-08 NOTE — OCCUPATIONAL THERAPY NOTE
[address] Progress Note     Patient Name: Leslie Mcfarland  YFSCC'V Date: 2/8/2018  Problem List  Patient Active Problem List   Diagnosis    Gastric leak    Acute peritonitis    Idiopathic intracranial hypertension    S/P  shunt     (ventriculoperitoneal) shunt status    Murmur, cardiac    Refusal of blood product    Gastroesophageal reflux disease    Choroidal nevus, right eye             02/08/18 1429   Restrictions/Precautions   Weight Bearing Precautions Per Order No   Other Precautions Chair Alarm; Bed Alarm; Impulsive;Multiple lines; Fall Risk;Pain;Fluid restriction  (NG TUBE; 5 ADENIKE DRAINS )   Pain Assessment   Pain Assessment FLACC   Pain Score ("LITTLE")   Pain Type Acute pain;Surgical pain   Pain Location Abdomen   Patient's Stated Pain Goal No pain   Hospital Pain Intervention(s) Ambulation/increased activity;Repositioned;Distraction; Emotional support   Bed Mobility   Supine to Sit 3  Moderate assistance   Additional items Assist x 2; Increased time required;Verbal cues;LE management   Transfers   Sit to Stand 3  Moderate assistance   Additional items Assist x 1; Increased time required;Verbal cues   Stand to Sit 3  Moderate assistance   Additional items Assist x 1; Increased time required;Verbal cues   Stand pivot 3  Moderate assistance   Additional items Assist x 1; Increased time required;Verbal cues   Functional Mobility   Functional Mobility 3  Moderate assistance   Additional items Rolling walker   Cognition   Overall Cognitive Status WFL   Arousal/Participation Responsive; Cooperative   Attention Attends with cues to redirect   Memory Decreased recall of precautions   Following Commands Follows one step commands without difficulty   Activity Tolerance   Activity Tolerance Patient limited by pain; Patient limited by fatigue   Medical Staff Made Aware KAUSHAL LOCKWOOD PRESENT FOR PORTION OF SESSION   RN MADE AWARE OF PT FEELING "HOT AND SWEATY"   Assessment   Assessment PT SEEN FOR OT TREATMENT SESSION FOCUSING ON LB DRESSING, BED MOBILITY, FUNCTIONAL TRANSFERS AND OVERALL ACTIVITY TOLERANCE  PT COMPLETED LB DRESSING, INCLUDING DONNINF B/L SOCKS WHILE SUPINE IN BED WITH HOB ELEVATED  IT IS BELIEVED PT WOULD REQUIRE ADDITIONAL ASSISTANCE FOR OTHER LB DRESSING OR IF COMPLETING LB DRESSING SITTING EOB/STANDING  PT REQUIRED MOD A X2 FOR SUPINE ->SIT TRANSFERS  INITIALLY PT REQUIRED MOD A X2 FOR ATTEMPTED SIT<->STAND TRANSFER  PT REEDUCATED ON SAFE TRANSFER TECHNIQUE/PROPER USE OF RW  FOLLOWING EDUCATION, PT AT MOD A X1 FOR SIT<->STAND TRANSFER  MOD A FOR LIMITED MOBILITY FROM BED->CHAIR W/ USE OF RW W/ ASSIST OF 2ND PERSON FOR LINE MANAGEMENT  PT IMPUSLIVE T/O TRANSFER, STATING "I JUST NEED TO GET TO THE CHAIR, MY LEGS ARE SO HEAVY"  PT EDUCATED ON THE IMPORTANCE OF PACING ACTIVITY AND SAFETY T/O  ATTEMPTED TO DISCUSSED COPING/LEISURE ACTIVITY 2' PT PRESENTING WITH FLAT AFFECT/QUIET TONE T/O HOWEVER PT STATED "I JUST WANT TO GET BETTER AND GET OUT OF HERE!" PT IS PROGRESSING, HOWEVER, REMAINS LIMITED  CONT TO RECOMMEND INPT REHAB AT THIS TIME  WILL CONT TO FOLLOW TO ADDRESS THE PREVIOUS DESCRIEBD GOALS WITH THE BELOW GOALS ADDED TO PT'S TX PLAN  Plan   Treatment Interventions ADL retraining;Functional transfer training; Endurance training;Patient/family training;Equipment evaluation/education; Compensatory technique education; Activityengagement   Goal Expiration Date 03/04/18   Treatment Day 1   OT Frequency 3-5x/wk   Recommendation   Recommendation (PT MAY BENEFIT FROM NEUROPSYCH CONSULT )   OT Discharge Recommendation Short Term Rehab   OT - OK to Discharge Yes  (TO INTP REHAB WHEN MEDICALLY CLEARED )   Barthel Index   Feeding 10  (CURRENTLY NPO)   Bathing 0   Grooming Score 0   Dressing Score 5   Bladder Score 10   Bowels Score 10   Toilet Use Score 5   Transfers (Bed/Chair) Score 5   Mobility (Level Surface) Score 0   Stairs Score 0   Barthel Index Score 45       The below goals added to pt's tx plan:      -Pt will improve functional transfers/ mobility to MOD I on/off all surfaces w/ G balance/safety including toileting   -Pt will identify 2-3 coping strategies that promote G mental health that can be completed within the hospital and carry over to d/c environment    -Pt will identify 2-3 leisure activities that promote G mental health that can be completed within the hospital and carry over to d/c environment         Documentation completed by RUTH Davenport, OTR/L

## 2018-02-08 NOTE — CONSULTS
Progress Note - Anesthesia Acute Pain Management    Akila Perez 36 y o  female MRN: [id number]  Unit/Bed#: [facility] 909-01 Encounter: [id number]      Assessment:   Principal Problem:    Gastric leak  Active Problems:    Acute peritonitis    Idiopathic intracranial hypertension    S/P  shunt     (ventriculoperitoneal) shunt status    Murmur, cardiac    Refusal of blood product    Gastroesophageal reflux disease    Choroidal nevus, right eye      Pain History  Current pain location(s): Abdomen  Current Analgesic regimen:  Gabapentin 300mg qhs, tylenol WI 325mg q4h PRN, hydromorphone IV 0 5-1mg q3h PRN, ketamine gtt  24 hour history: Patient states that she is having dysphoria from ketamine infusion, night sweats, hot flashes, insomnia, anxious/agitated  She requested to d/c ketamine and is motivated to decrease her pain regimen in order to prepare for her eventual discharge  She hasn't used PRN hydromorphone since 2/6 and states that she can tolerate the pain  Meds/Allergies     Allergies   Allergen Reactions    Benadryl [Diphenhydramine] Swelling    Phenergan [Promethazine] Hives       Objective     Temp:  [98 2 °F (36 8 °C)-99 3 °F (37 4 °C)] 98 3 °F (36 8 °C)  HR:  [81-96] 87  Resp:  [16-18] 18  BP: (145-175)/(57-99) 175/99      Intake/Output Summary (Last 24 hours) at 02/08/18 1129  Last data filed at 02/08/18 0641   Gross per 24 hour   Intake                0 ml   Output             1972 ml   Net            -1972 ml                 Physical Exam: BP (!) 175/99 (BP Location: Left arm)   Pulse 87   Temp 98 3 °F (36 8 °C) (Oral)   Resp 18   Ht 5' 4" (1 626 m)   Wt 71 5 kg (157 lb 10 1 oz)   LMP  (LMP Unknown)   SpO2 96%   Breastfeeding?  No   BMI 27 06 kg/m²   Gen: Well appearing and well nourished, NAD, sweaty brow  CV: RRR, +s1/s2, no M/R/G  Pul: Lungs CTAB  Abd: Soft, non-tender, non-distended  Ext:  No cyanosis or edema  Neuro: AAOx3; CN II-XII grossly intact; 5/5 BLUE, 5/5 BLLE; Sensation intact grossly   Psych:  Agitated    Lab Results:  Lab Results   Component Value Date    WBC 22 20 (H) 02/08/2018    HGB 9 7 (L) 02/08/2018    HCT 30 5 (L) 02/08/2018    MCV 94 02/08/2018     (H) 02/08/2018     Lab Results   Component Value Date    GLUCOSE 149 (H) 02/08/2018    CALCIUM 8 5 02/08/2018     02/08/2018    K 3 7 02/08/2018    CO2 32 02/08/2018     02/08/2018    BUN 18 02/08/2018    CREATININE 0 30 (L) 02/08/2018     Plan:   1) Discontinue ketamine  2) Patient would like to d/c gabapentin but given her insomnia and discontinuation of ketamine today it is a little drastic to do so now   She can refuse the nightly dose of gabapentin if she wishes  3) APS will continue to follow and recommend PO pain regimen when she is able to take pills by mouth      SIGNATURE: Marko Iqbal MD  DATE: February 8, 2018  TIME: 11:29 AM

## 2018-02-08 NOTE — PHYSICAL THERAPY NOTE
PT Progress Note     02/08/18 1520   Pain Assessment   Pain Assessment No/denies pain   Hospital Pain Intervention(s) Ambulation/increased activity   Response to Interventions unchanged   Restrictions/Precautions   Other Precautions Chair Alarm; Fall Risk;Multiple lines   General   Chart Reviewed Yes   Response to Previous Treatment Patient with no complaints from previous session  Family/Caregiver Present No   Cognition   Attention Attends with cues to redirect   Orientation Level Oriented to person;Oriented to place   Following Commands Follows one step commands with increased time or repetition   Subjective   Subjective pt in chair, agreeable for PT amb   Transfers   Sit to Stand 3  Moderate assistance   Additional items Increased time required;Verbal cues   Stand to Sit 3  Moderate assistance   Additional items Increased time required;Verbal cues   Stand pivot 3  Moderate assistance   Additional items Increased time required;Verbal cues  (RW)   Ambulation/Elevation   Gait pattern Improper Weight shift; Excessively slow; Poor UE support;Decreased foot clearance; Short stride   Gait Assistance 4  Minimal assist   Additional items Verbal cues; Tactile cues   Assistive Device Rolling walker   Distance 10 ft 15 ft   Balance   Static Standing Fair -  (RW)   Dynamic Standing Poor +  (RW)   Endurance Deficit   Endurance Deficit Yes   Endurance Deficit Description weakness   Activity Tolerance   Activity Tolerance Patient limited by fatigue   Exercises   Hip Flexion Sitting;AROM; Bilateral;10 reps  (x3)   Knee AROM Sitting;AROM; Bilateral;10 reps  (x3)   Assessment   Prognosis Good   Problem List Decreased strength;Decreased range of motion;Decreased endurance; Impaired balance;Decreased mobility; Decreased coordination;Decreased cognition; Impaired judgement;Decreased safety awareness;Decreased skin integrity   Assessment pt progressed significantly w mob and activity tolerance; requires mod assist w transf and amb 10 ft 15 ft using RW, additional assist required w chair follow for safety; gait w inconsistent bree, decreased step length, foot clearance; walker reliant + falls risk; fatigued w exertion; pt needs much encouragement to progress and increase activity levels and tolerance OOB; rec cont PT IP rehab when med cleared   Goals   STG Expiration Date 02/21/18   Plan   Treatment/Interventions Functional transfer training;LE strengthening/ROM; Elevations; Therapeutic exercise; Endurance training;Cognitive reorientation;Patient/family training;Equipment eval/education; Bed mobility;Gait training; Compensatory technique education;Continued evaluation;Spoke to nursing;Spoke to advanced practitioner   Progress Slow progress, medical status limitations   PT Frequency 5x/wk   Recommendation   Recommendation Post acute IP rehab   Equipment Recommended Walker; Wheelchair   PT - OK to Discharge Yes

## 2018-02-09 ENCOUNTER — APPOINTMENT (INPATIENT)
Dept: RADIOLOGY | Facility: HOSPITAL | Age: 43
DRG: 711 | End: 2018-02-09
Payer: COMMERCIAL

## 2018-02-09 LAB
ANION GAP SERPL CALCULATED.3IONS-SCNC: 7 MMOL/L (ref 4–13)
BASOPHILS # BLD AUTO: 0.1 THOUSANDS/ΜL (ref 0–0.1)
BASOPHILS NFR BLD AUTO: 0 % (ref 0–1)
BUN SERPL-MCNC: 19 MG/DL (ref 5–25)
CALCIUM SERPL-MCNC: 8.6 MG/DL (ref 8.3–10.1)
CHLORIDE SERPL-SCNC: 102 MMOL/L (ref 100–108)
CO2 SERPL-SCNC: 32 MMOL/L (ref 21–32)
CREAT SERPL-MCNC: 0.31 MG/DL (ref 0.6–1.3)
EOSINOPHIL # BLD AUTO: 0.25 THOUSAND/ΜL (ref 0–0.61)
EOSINOPHIL NFR BLD AUTO: 1 % (ref 0–6)
ERYTHROCYTE [DISTWIDTH] IN BLOOD BY AUTOMATED COUNT: 16.8 % (ref 11.6–15.1)
GFR SERPL CREATININE-BSD FRML MDRD: 139 ML/MIN/1.73SQ M
GLUCOSE SERPL-MCNC: 153 MG/DL (ref 65–140)
GLUCOSE SERPL-MCNC: 161 MG/DL (ref 65–140)
GLUCOSE SERPL-MCNC: 172 MG/DL (ref 65–140)
GLUCOSE SERPL-MCNC: 184 MG/DL (ref 65–140)
GLUCOSE SERPL-MCNC: 186 MG/DL (ref 65–140)
HCT VFR BLD AUTO: 29.7 % (ref 34.8–46.1)
HGB BLD-MCNC: 9.2 G/DL (ref 11.5–15.4)
LYMPHOCYTES # BLD AUTO: 2.5 THOUSANDS/ΜL (ref 0.6–4.47)
LYMPHOCYTES NFR BLD AUTO: 11 % (ref 14–44)
MAGNESIUM SERPL-MCNC: 2.2 MG/DL (ref 1.6–2.6)
MCH RBC QN AUTO: 29.1 PG (ref 26.8–34.3)
MCHC RBC AUTO-ENTMCNC: 31 G/DL (ref 31.4–37.4)
MCV RBC AUTO: 94 FL (ref 82–98)
MONOCYTES # BLD AUTO: 1.28 THOUSAND/ΜL (ref 0.17–1.22)
MONOCYTES NFR BLD AUTO: 6 % (ref 4–12)
NEUTROPHILS # BLD AUTO: 19.06 THOUSANDS/ΜL (ref 1.85–7.62)
NEUTS SEG NFR BLD AUTO: 82 % (ref 43–75)
NRBC BLD AUTO-RTO: 0 /100 WBCS
PHOSPHATE SERPL-MCNC: 3.7 MG/DL (ref 2.7–4.5)
PLATELET # BLD AUTO: 622 THOUSANDS/UL (ref 149–390)
PMV BLD AUTO: 9.6 FL (ref 8.9–12.7)
POTASSIUM SERPL-SCNC: 3.8 MMOL/L (ref 3.5–5.3)
PREALB SERPL-MCNC: 14.2 MG/DL (ref 18–40)
RBC # BLD AUTO: 3.16 MILLION/UL (ref 3.81–5.12)
SODIUM SERPL-SCNC: 141 MMOL/L (ref 136–145)
WBC # BLD AUTO: 23.37 THOUSAND/UL (ref 4.31–10.16)

## 2018-02-09 PROCEDURE — 82948 REAGENT STRIP/BLOOD GLUCOSE: CPT

## 2018-02-09 PROCEDURE — 84100 ASSAY OF PHOSPHORUS: CPT | Performed by: STUDENT IN AN ORGANIZED HEALTH CARE EDUCATION/TRAINING PROGRAM

## 2018-02-09 PROCEDURE — 97530 THERAPEUTIC ACTIVITIES: CPT

## 2018-02-09 PROCEDURE — 97535 SELF CARE MNGMENT TRAINING: CPT

## 2018-02-09 PROCEDURE — 99233 SBSQ HOSP IP/OBS HIGH 50: CPT | Performed by: INTERNAL MEDICINE

## 2018-02-09 PROCEDURE — 85025 COMPLETE CBC W/AUTO DIFF WBC: CPT | Performed by: STUDENT IN AN ORGANIZED HEALTH CARE EDUCATION/TRAINING PROGRAM

## 2018-02-09 PROCEDURE — 84134 ASSAY OF PREALBUMIN: CPT | Performed by: STUDENT IN AN ORGANIZED HEALTH CARE EDUCATION/TRAINING PROGRAM

## 2018-02-09 PROCEDURE — 97110 THERAPEUTIC EXERCISES: CPT

## 2018-02-09 PROCEDURE — 83735 ASSAY OF MAGNESIUM: CPT | Performed by: STUDENT IN AN ORGANIZED HEALTH CARE EDUCATION/TRAINING PROGRAM

## 2018-02-09 PROCEDURE — 74018 RADEX ABDOMEN 1 VIEW: CPT

## 2018-02-09 PROCEDURE — C9113 INJ PANTOPRAZOLE SODIUM, VIA: HCPCS | Performed by: NURSE PRACTITIONER

## 2018-02-09 PROCEDURE — 80048 BASIC METABOLIC PNL TOTAL CA: CPT | Performed by: STUDENT IN AN ORGANIZED HEALTH CARE EDUCATION/TRAINING PROGRAM

## 2018-02-09 RX ORDER — ONDANSETRON 2 MG/ML
4 INJECTION INTRAMUSCULAR; INTRAVENOUS ONCE
Status: COMPLETED | OUTPATIENT
Start: 2018-02-09 | End: 2018-02-09

## 2018-02-09 RX ADMIN — INSULIN LISPRO 1 UNITS: 100 INJECTION, SOLUTION INTRAVENOUS; SUBCUTANEOUS at 18:20

## 2018-02-09 RX ADMIN — ONDANSETRON 4 MG: 2 INJECTION INTRAMUSCULAR; INTRAVENOUS at 22:09

## 2018-02-09 RX ADMIN — ONDANSETRON 4 MG: 2 INJECTION INTRAMUSCULAR; INTRAVENOUS at 19:19

## 2018-02-09 RX ADMIN — HEPARIN SODIUM 5000 UNITS: 5000 INJECTION, SOLUTION INTRAVENOUS; SUBCUTANEOUS at 20:35

## 2018-02-09 RX ADMIN — Medication 3 G: at 20:36

## 2018-02-09 RX ADMIN — INSULIN LISPRO 1 UNITS: 100 INJECTION, SOLUTION INTRAVENOUS; SUBCUTANEOUS at 06:38

## 2018-02-09 RX ADMIN — ONDANSETRON 4 MG: 2 INJECTION INTRAMUSCULAR; INTRAVENOUS at 12:21

## 2018-02-09 RX ADMIN — INSULIN LISPRO 1 UNITS: 100 INJECTION, SOLUTION INTRAVENOUS; SUBCUTANEOUS at 14:06

## 2018-02-09 RX ADMIN — METOPROLOL TARTRATE 10 MG: 1 INJECTION, SOLUTION INTRAVENOUS at 09:08

## 2018-02-09 RX ADMIN — METOPROLOL TARTRATE 10 MG: 1 INJECTION, SOLUTION INTRAVENOUS at 05:00

## 2018-02-09 RX ADMIN — HEPARIN SODIUM 5000 UNITS: 5000 INJECTION, SOLUTION INTRAVENOUS; SUBCUTANEOUS at 09:04

## 2018-02-09 RX ADMIN — PANTOPRAZOLE SODIUM 40 MG: 40 INJECTION, POWDER, FOR SOLUTION INTRAVENOUS at 09:04

## 2018-02-09 RX ADMIN — Medication 3 G: at 14:05

## 2018-02-09 RX ADMIN — Medication 3 G: at 02:21

## 2018-02-09 RX ADMIN — NORTRIPTYLINE HYDROCHLORIDE 10 MG: 10 CAPSULE ORAL at 19:12

## 2018-02-09 RX ADMIN — METOPROLOL TARTRATE 10 MG: 1 INJECTION, SOLUTION INTRAVENOUS at 21:59

## 2018-02-09 RX ADMIN — FLUCONAZOLE 400 MG: 2 INJECTION INTRAVENOUS at 16:35

## 2018-02-09 RX ADMIN — Medication 3 G: at 09:04

## 2018-02-09 RX ADMIN — NORTRIPTYLINE HYDROCHLORIDE 10 MG: 10 CAPSULE ORAL at 09:12

## 2018-02-09 RX ADMIN — GABAPENTIN 300 MG: 250 SOLUTION ORAL at 22:05

## 2018-02-09 NOTE — OCCUPATIONAL THERAPY NOTE
Occupational Therapy Treatment Note:       02/09/18 1156   Pain Assessment   Pain Assessment 0-10   Pain Score No Pain   ADL   Where Assessed Supine, bed   Grooming Assistance 4  Minimal Assistance   Grooming Deficit Setup; Increased time to complete; Teeth care   UB Bathing Assistance 3  Moderate Assistance   UB Bathing Deficit Setup;Verbal cueing; Increased time to complete   LB Bathing Assistance 3  Moderate Assistance   LB Bathing Deficit Setup;Verbal cueing; Increased time to complete   LB Bathing Comments donned socks supine; declined donning bedside   Toileting Assistance  2  Maximal Assistance   Toileting Deficit Setup; Increased time to complete;Use of bedpan/urinal setup;Perineal hygiene   Toileting Comments "I can't make it to the bathroom  I need the bed pan"   Bed Mobility   Rolling L 3  Moderate assistance   Additional items Assist x 1;Bedrails; Increased time required;Verbal cues   Supine to Sit 3  Moderate assistance   Additional items Assist x 1;HOB elevated; Bedrails; Increased time required;Verbal cues   Sit to Supine 3  Moderate assistance   Additional items Assist x 1;Bedrails;Verbal cues;LE management   Additional Comments vc's to problem solve and plan compensatory techniques   Transfers   Sit to Stand Unable to assess  (attempted; unable; "have to lie down, dizzy")   Cognition   Overall Cognitive Status [facility]   Arousal/Participation Responsive   Attention Attends with cues to redirect   Orientation Level Oriented X4   Memory Decreased recall of recent events;Decreased recall of precautions   Following Commands Follows one step commands with increased time or repetition   Comments Patient with flat affect; appears anxious; mod encouragemnt provided with good response   Activity Tolerance   Activity Tolerance Patient limited by fatigue   Medical Staff Made Aware ok to see per maegan Willett   Assessment   Assessment Patient seen for skilled OT with focus on ADL skill training, bed mobility, toileting, activity engagement, activity endurance/tolerance  Patient with moderate decreased motivation  Patient responds well to moderate positive reinforcement and encouragement  Patient preferred donning socks from supine position "it feels safer"  Patient requested assist with perianal hygiene, however, performed independently after set up  Patient seated edge of bed for approx 6-7 minutes and then requested to lie down "feeling dizzy"  Patient would benefit from short term rehab with focus on increasing functional strength and independence skills for carryover into her own enviroment  Plan   Treatment Interventions ADL retraining;Functional transfer training; Endurance training; Compensatory technique education; Activityengagement   Goal Expiration Date 03/04/18   Treatment Day 2   OT Frequency 3-5x/wk   Recommendation   OT Discharge Recommendation Short Term Rehab   OT - OK to Discharge Yes  ([facility] when medically cleared)   Barthel Index   Feeding 10  (currently NPO)   Bathing 0   Grooming Score 0   Dressing Score 0   Bladder Score 10   Bowels Score 10   Toilet Use Score 5   Transfers (Bed/Chair) Score 10   Mobility (Level Surface) Score 0   Stairs Score 0   Barthel Index Score 45   Modified Sendy Scale   Modified Garland Scale 4   CHELSY Zhang

## 2018-02-09 NOTE — PHYSICAL THERAPY NOTE
Physical Therapy Progress Note     02/09/18 8440   Pain Assessment   Pain Assessment 0-10   Pain Score No Pain   Hospital Pain Intervention(s) Repositioned; Ambulation/increased activity   Restrictions/Precautions   Weight Bearing Precautions Per Order No   Other Precautions Multiple lines; Fall Risk   General   Chart Reviewed Yes   Response to Previous Treatment Patient with no complaints from previous session  Family/Caregiver Present No   Cognition   Overall Cognitive Status WFL   Arousal/Participation Cooperative;Lethargic   Attention Attends with cues to redirect   Orientation Level Oriented X4   Following Commands Follows one step commands with increased time or repetition   Subjective   Subjective This was second attempt at session, secondary to patient feeling significantly nauseous earlier in the day  Patient was in bed upon session, willing to attempt exercise and sitting in the chair  Bed Mobility   Rolling R 3  Moderate assistance   Additional items Assist x 1;Bedrails;HOB elevated; Increased time required;Verbal cues   Supine to Sit 3  Moderate assistance   Additional items Assist x 1;HOB elevated; Increased time required;Verbal cues; Bedrails;LE management   Transfers   Sit to Stand 3  Moderate assistance   Additional items Assist x 1; Increased time required;Verbal cues   Stand to Sit 3  Moderate assistance   Additional items Assist x 1; Increased time required;Verbal cues   Stand pivot 3  Moderate assistance   Additional items Assist x 1; Increased time required;Verbal cues   Ambulation/Elevation   Gait pattern Inconsistent bree; Foward flexed; Short stride; Excessively slow; Improper Weight shift   Gait Assistance 4  Minimal assist   Additional items Verbal cues; Assist x 1   Assistive Device Rolling walker   Distance 2ft   (from bed to chair)   Balance   Static Sitting Fair   Dynamic Sitting Fair   Static Standing Fair -   Dynamic Standing Poor +   Ambulatory Poor +   Endurance Deficit   Endurance Deficit Yes   Endurance Deficit Description fatigue and weakness   Activity Tolerance   Activity Tolerance Patient limited by fatigue   Nurse Made Aware appropriate to see    Exercises   Quad Sets 15 reps; Supine;AROM; Bilateral   Heelslides Supine;10 reps;AROM; Bilateral   Glute Sets Supine;10 reps;AROM; Bilateral   Hip Abduction 10 reps; Supine;AROM; Bilateral   Hip Adduction Supine;10 reps;AROM; Bilateral   Ankle Pumps Supine;15 reps;AROM; Bilateral   Assessment   Prognosis Good   Problem List Decreased strength;Decreased endurance; Impaired balance;Decreased mobility;Pain   Assessment Second attempt at therapy, secondary to patient reporting significant nausousness earlier in day  Patient willing to complete supine exercises and willing to sit in chair  Patient was moderate assistance sit to stand from bed with use of RW  Cues required for proper hand placement and safety  Patient able to ambulate 2 steps with RW to chair  SIgnificant fatigue noted  Encouragment continued to be needed to perform activity  Patient would continue to benefit from therapy until patient medically cleared  Goals   Patient Goals to go home, to be able to do more by herself   STG Expiration Date 02/21/18   LTG Expiration Date 02/15/18   Treatment Day 4   Plan   Treatment/Interventions Functional transfer training;LE strengthening/ROM; Endurance training;Gait training;Bed mobility   Progress Slow progress, decreased activity tolerance   PT Frequency 5x/wk   Recommendation   Recommendation Post acute IP rehab   Equipment Recommended Walker; Wheelchair     Aguso Dubin, [place]

## 2018-02-09 NOTE — SOCIAL WORK
Cm reviewed patient during care coordination rounds  Patient is not medically stable for dc today  Patient would like to d/c home, but recommendations continue to be for rehab  Cm already provide list to patient and patient's mother  Will need choices, medical clearance and bed availability

## 2018-02-09 NOTE — PLAN OF CARE
Problem: OCCUPATIONAL THERAPY ADULT  Goal: Performs self-care activities at highest level of function for planned discharge setting  See evaluation for individualized goals  Treatment Interventions: ADL retraining, Functional transfer training, Endurance training, Patient/family training, Equipment evaluation/education, Compensatory technique education, Activityengagement          See flowsheet documentation for full assessment, interventions and recommendations  Outcome: Progressing  Limitation: Decreased ADL status, Decreased self-care trans, Decreased high-level ADLs, Decreased endurance  Prognosis: Good  Assessment: Patient seen for skilled OT with focus on ADL skill training, bed mobility, toileting, activity engagement, activity endurance/tolerance  Patient with moderate decreased motivation  Patient responds well to moderate positive reinforcement and encouragement  Patient preferred donning socks from supine position "it feels safer"  Patient requested assist with perianal hygiene, however, performed independently after set up  Patient seated edge of bed for approx 6-7 minutes and then requested to lie down "feeling dizzy"  Patient would benefit from short term rehab with focus on increasing functional strength and independence skills for carryover into her own enviroment     Recommendation:  (PT MAY BENEFIT FROM NEUROPSYCH CONSULT )  OT Discharge Recommendation: Short Term Rehab  OT - OK to Discharge: Yes (INPT REHAB when medically cleared)  Stephen Evans

## 2018-02-09 NOTE — PLAN OF CARE
Problem: PHYSICAL THERAPY ADULT  Goal: Performs mobility at highest level of function for planned discharge setting  See evaluation for individualized goals  Treatment/Interventions: Functional transfer training, LE strengthening/ROM, Therapeutic exercise, Endurance training, Bed mobility, Spoke to nursing  Equipment Recommended:  (R/O LEAST RESTRICTIVE AD )       See flowsheet documentation for full assessment, interventions and recommendations  Outcome: Progressing  Prognosis: Good  Problem List: Decreased strength, Decreased endurance, Impaired balance, Decreased mobility, Pain  Assessment: Second attempt at therapy, secondary to patient reporting significant nausousness earlier in day  Patient willing to complete supine exercises and willing to sit in chair  Patient was moderate assistance sit to stand from bed with use of RW  Cues required for proper hand placement and safety  Patient able to ambulate 2 steps with RW to chair  SIgnificant fatigue noted  Encouragment continued to be needed to perform activity  Patient would continue to benefit from therapy until patient medically cleared  Barriers to Discharge: Inaccessible home environment, Decreased caregiver support     Recommendation: Post acute IP rehab     PT - OK to Discharge: Yes    See flowsheet documentation for full assessment

## 2018-02-09 NOTE — PROGRESS NOTES
Progress Note - Anesthesia Acute Pain Management    Franklyn Salinas 36 y o  female MRN: [id number]  Unit/Bed#: [facility] 909-01 Encounter: [id number]      SURGERY DATE: 2/5/2018  Post-Op Diagnosis Codes:     *  (ventriculoperitoneal) shunt status [Z98 2]    Assessment:   36 y o  female status post Procedure(s):  Removal of  shunt POD# 4  Well controlled acute post operative pain    Principal Problem:    Gastric leak  Active Problems:    Acute peritonitis    Idiopathic intracranial hypertension    S/P  shunt     (ventriculoperitoneal) shunt status    Murmur, cardiac    Refusal of blood product    Gastroesophageal reflux disease    Choroidal nevus, right eye      Plan:   1  Recommend discontinue gabapentin  2  No other changes to pain regimen or further recs    APS sign off  Thank you for the Consult  Please call  ( [facility] [phone]) with any further questions    Pain Course:    24 hour history:  Patient states that she is doing much better  Her pain now is controlled to 4/10 in the abdomen and is happy with that    Patient states she is ready to go home    Meds/Allergies     Inpatient Medications:  Scheduled Meds:   Current Facility-Administered Medications:  acetaminophen 325 mg Rectal Q4H PRN Mayelin Gaviria MD    Adult TPN (CUSTOM BASE/CUSTOM ELECTROLYTE)  Intravenous Continuous Bere Dawson MD Last Rate: 58 7 mL/hr at 02/08/18 2118   ampicillin-sulbactam 3 g Intravenous Q6H Cristhian Ceballos MD Last Rate: 3 g (02/09/18 0904)   bisacodyl 10 mg Rectal Daily PRN Mayelin Gaviria MD    fluconazole 400 mg Intravenous Q24H ABNER Spicer Last Rate: 400 mg (02/08/18 1708)   gabapentin 300 mg Oral HS Marvin Lerner MD    heparin (porcine) 5,000 Units Subcutaneous Q12H [address] Marvin Lerner MD    HYDROmorphone 0 5 mg Intravenous Q3H PRN Slime Ruiz PA-C    HYDROmorphone 1 mg Intravenous Q3H PRN Drew Osorio MD    insulin lispro 1-5 Units Subcutaneous Q6H [address] Melvina Pat AURELIO    iohexol 50 mL Oral 90 min pre-procedure Roslyn Brooks MD    metoprolol 10 mg Intravenous Q6H Porfirio Ritchie MD    mirtazapine 15 mg Oral HS José Miguel Kirkpatrick PA-C    nortriptyline 10 mg Oral BID Porfirio Ritchie MD    ondansetron 4 mg Intravenous Q4H PRN Yolanda Salazar MD    pantoprazole 40 mg Intravenous Q24H [address] ABNER Tse      Continuous Infusions:   Adult TPN (CUSTOM BASE/CUSTOM ELECTROLYTE)  Last Rate: 58 7 mL/hr at 02/08/18 2118     PRN Meds:    acetaminophen 325 mg Q4H PRN   bisacodyl 10 mg Daily PRN   HYDROmorphone 0 5 mg Q3H PRN   HYDROmorphone 1 mg Q3H PRN   ondansetron 4 mg Q4H PRN         Allergies   Allergen Reactions    Benadryl [Diphenhydramine] Swelling    Phenergan [Promethazine] Hives       Objective     Physical Exam: /89 (BP Location: Left arm)   Pulse 95   Temp 98 6 °F (37 °C) (Oral)   Resp 18   Ht 5' 4" (1 626 m)   Wt 72 kg (158 lb 11 7 oz)   LMP  (LMP Unknown)   SpO2 96%   Breastfeeding?  No   BMI 27 25 kg/m²   Gen: Well appearing and well nourished, NAD  Skin: Warm, Dry   HEENT:  PERRLA, EOMI  Pul: non labored resp effort  Ext:  No cyanosis or edema  Neuro: AAOx3; CN II-XII grossly intact; 5/5 BLUE, 5/5 BLLE; Sensation intact grossly   Psych:  Pleasant and conversant    SIGNATURE: Christie Valdez MD  DATE: February 9, 2018  TIME: 11:32 AM

## 2018-02-09 NOTE — PROGRESS NOTES
Progress Note - Infectious Disease   Sherry Whitley 36 y o  female MRN: [id number]  Unit/Bed#: [facility] 909-01 Encounter: [id number]      Impression/Recommendations:  1   Intra-abdominal/pelvic abscesses   Patient is status post multiple abdominal washouts   She has multiple drains in place   She is status post placement of drainage in a new perisplenic collection   She is clinically well and systemically stable  Operative culture from [facility], growing only ampicillin susceptible Enterococcus   Operative culture here also growing  only ampicillin susceptible Enterococcus   Latest culture also grew Saccharomyces, most likely colonization   Repeat CT from 2/4 showed improving collection  WBC improved but still in the 20s  Continue with IV Unasyn/fluconazole  Continue drainage      2   Possible infected  shunt   Given presence of tip of  shunt in peritoneal space, there is high probability of  shunt infection   Fortunately, patient has no symptoms concerning for meningitis   She has neurological deficits   She is status post tapping of  shunt at [facility]   CSF culture there had no growth but patient had prior antibiotic  Malorie Kitchen will go ahead and treat her for possible/presumptive  shunt infection   Patient is now status post  shunt resection   CSF culture here also negative   With removal shunt, antibiotic course can be decreased to 2-3 weeks     Antibiotic plan as in above  Treat x 2 post VPS resection      3   Gastric perforation, status post repair, with persistent leak   She is now status post placement of esophageal stent   Unfortunately, repeat abdomen/pelvis CT showed recurrent leak with new perisplenic collection   She is now status post placement of duodenal feeding tube and drainage of the perisplenic collection  Most recent repeat CT showed no further leak  Patient is now tolerating enteral feeding via feeding tube in duodenum  Monitor for recurrent leak    Patient will most likely need a repeat barium swallow down the line      4   Bilateral pleural effusion, status post chest tube placement   This is most likely sympathetic effusion, secondary to intra-abdominal abscesses   Chest tubes have been removed      5  Recurrent leukocytosis   WBC has improved but still remains in the 20s, with low-grade fever  If WBC does not decrease the next few days, we may need to repeat abdomen/pelvis CT  Antibiotic plan as in above  Monitor WBC and temperature      Discussed with the patient and her fiance in detail regarding above plan      Antibiotics:  Unasyn  Fluconazole # 11  POD # 15  Post VPS extraction # 4     Subjective:  Patient is comfortable  Abdominal pain continues to improve     Minimal headache now  Minimal chest wall pain  She is awake and alert  Temperature low-grade   No chills  She is tolerating antibiotic well   No nausea, vomiting or diarrhea  Objective:  Vitals:  HR:  [] 95  Resp:  [16-18] 18  BP: (149-169)/(72-92) 160/89  SpO2:  [96 %-97 %] 96 %  Temp (24hrs), Av 1 °F (37 3 °C), Min:98 6 °F (37 °C), Max:100 5 °F (38 1 °C)  Current: Temperature: 98 6 °F (37 °C)    Physical Exam:     General: Awake, alert, cooperative, no distress  Head:  Incision healing well  No drainage  No erythema/warmth  Minimal tenderness  Chest:  Incision healing well  Minimal tenderness  No fluctuance  No erythema/warmth  No drainage  Lungs: Expansion symmetric, no rales, no wheezing, respirations unlabored  Heart[de-identified]  Regular rate and rhythm, S1 and S2 normal, no murmur  Abdomen: Soft, mildly distended  Incision healing well  Drains seropurulent  Stable mild incisional tenderness  Extremities: Trace edema  No erythema/warmth  No ulcer  Nontender to palpation  Skin:  No rash       Invasive Devices     Peripherally Inserted Central Catheter Line            PICC Line 68/46/03 Right Basilic 19 days          Drain            Closed/Suction Drain Left LLQ Bulb 19 Fr  14 days Closed/Suction Drain Left LUQ Bulb 19 Fr  14 days    Closed/Suction Drain Right RLQ Bulb 19 Fr  14 days    Closed/Suction Drain Right RUQ Accordion 19 Fr  14 days    Closed/Suction Drain Left Back Bulb 12 Fr  9 days    NG/OG/Enteral Tube Enteral Feeding Tube Right nares 9 days                Labs studies:   I have personally reviewed pertinent labs  Results from last 7 days  Lab Units 02/09/18  0453 02/08/18  0452 02/05/18  0524   SODIUM mmol/L 141 141 135*   POTASSIUM mmol/L 3 8 3 7 3 6   CHLORIDE mmol/L 102 103 97*   CO2 mmol/L 32 32 34*   ANION GAP mmol/L 7 6 4   BUN mg/dL 19 18 17   CREATININE mg/dL 0 31* 0 30* 0 28*   EGFR ml/min/1 73sq m 139 141 144   GLUCOSE RANDOM mg/dL 153* 149* 137   CALCIUM mg/dL 8 6 8 5 8 5       Results from last 7 days  Lab Units 02/09/18  0453 02/08/18  0452 02/05/18  0524   WBC Thousand/uL 23 37* 22 20* 26 68*   HEMOGLOBIN g/dL 9 2* 9 7* 9 0*   PLATELETS Thousands/uL 622* 651* 640*       Results from last 7 days  Lab Units 02/05/18  1215   GRAM STAIN RESULT  No Polys or Bacteria seen   WOUND CULTURE  No growth       Imaging Studies:   I have personally reviewed pertinent imaging study reports and images in PACS  EKG, Pathology, and Other Studies:   I have personally reviewed pertinent reports

## 2018-02-09 NOTE — PROGRESS NOTES
Progress Note - General Surgery   Akila Perez 36 y o  female MRN: [id number]  Unit/Bed#: [facility] 909-01 Encounter: [id number]    Assessment:  36y o -year-old female with gastric perforation s/p hiatal hernia repair at outside hospital, s/p esophageal stent placement, exploratory laparotomy and washout  Has had persistent esophageal/gastric leak on imaging  Had bilateral pleural effusions which were drained  Perisplenic fluid collection was drained  - low grade temp to 100 5, tolerating TF's at 30, having BM       Plan:  - advance TF's to goal today  - let TPN run out  - monitor for fevers  - may scan on 2/12  - unasyn/diflucan per ID x2 weeks  - OOB/ambulate  - ketamine off  - dispo planning  - PT/OT  - SQH/SCDs         Subjective/Objective   Chief Complaint:     Subjective: Passing flatus and having BM, worked with PT yesterday, wants to go home and do PT/OT out of home, will be cared for by mother    Objective:     Blood pressure 169/92, pulse 98, temperature 98 7 °F (37 1 °C), temperature source Oral, resp  rate 16, height 5' 4" (1 626 m), weight 72 kg (158 lb 11 7 oz), SpO2 96 %, not currently breastfeeding  ,Body mass index is 27 25 kg/m²        Intake/Output Summary (Last 24 hours) at 02/09/18 0914  Last data filed at 02/09/18 0755   Gross per 24 hour   Intake                0 ml   Output             1835 ml   Net            -1835 ml       Invasive Devices     Peripherally Inserted Central Catheter Line            PICC Line 35/21/47 Right Basilic 19 days          Drain            Closed/Suction Drain Left LLQ Bulb 19 Fr  14 days    Closed/Suction Drain Left LUQ Bulb 19 Fr  14 days    Closed/Suction Drain Right RLQ Bulb 19 Fr  14 days    Closed/Suction Drain Right RUQ Accordion 19 Fr  14 days    Closed/Suction Drain Left Back Bulb 12 Fr  9 days    NG/OG/Enteral Tube Enteral Feeding Tube Right nares 8 days                Physical Exam:   NAD  RRR  Norm resp effort  Abd soft, stably distended, min tender around incision, ADENIKE x4 with very minimal output in bulbs, thin serosang  Perisplenic drain (LUQ) serous   TF's at 30      Lab, Imaging and other studies:  I have personally reviewed pertinent lab results    , CBC:   Lab Results   Component Value Date    WBC 23 37 (H) 02/09/2018    HGB 9 2 (L) 02/09/2018    HCT 29 7 (L) 02/09/2018    MCV 94 02/09/2018     (H) 02/09/2018    MCH 29 1 02/09/2018    MCHC 31 0 (L) 02/09/2018    RDW 16 8 (H) 02/09/2018    MPV 9 6 02/09/2018    NRBC 0 02/09/2018   , CMP:   Lab Results   Component Value Date     02/09/2018    K 3 8 02/09/2018     02/09/2018    CO2 32 02/09/2018    ANIONGAP 7 02/09/2018    BUN 19 02/09/2018    CREATININE 0 31 (L) 02/09/2018    GLUCOSE 153 (H) 02/09/2018    CALCIUM 8 6 02/09/2018    EGFR 139 02/09/2018     VTE Pharmacologic Prophylaxis: Sequential compression device (Venodyne)   VTE Mechanical Prophylaxis: sequential compression device

## 2018-02-10 ENCOUNTER — APPOINTMENT (INPATIENT)
Dept: RADIOLOGY | Facility: HOSPITAL | Age: 43
DRG: 711 | End: 2018-02-10
Payer: COMMERCIAL

## 2018-02-10 LAB
BASOPHILS # BLD AUTO: 0.08 THOUSANDS/ΜL (ref 0–0.1)
BASOPHILS NFR BLD AUTO: 0 % (ref 0–1)
EOSINOPHIL # BLD AUTO: 0.27 THOUSAND/ΜL (ref 0–0.61)
EOSINOPHIL NFR BLD AUTO: 1 % (ref 0–6)
ERYTHROCYTE [DISTWIDTH] IN BLOOD BY AUTOMATED COUNT: 17.1 % (ref 11.6–15.1)
GLUCOSE SERPL-MCNC: 107 MG/DL (ref 65–140)
GLUCOSE SERPL-MCNC: 112 MG/DL (ref 65–140)
GLUCOSE SERPL-MCNC: 129 MG/DL (ref 65–140)
GLUCOSE SERPL-MCNC: 143 MG/DL (ref 65–140)
HCT VFR BLD AUTO: 30.2 % (ref 34.8–46.1)
HGB BLD-MCNC: 9.7 G/DL (ref 11.5–15.4)
LYMPHOCYTES # BLD AUTO: 2.53 THOUSANDS/ΜL (ref 0.6–4.47)
LYMPHOCYTES NFR BLD AUTO: 11 % (ref 14–44)
MCH RBC QN AUTO: 30.3 PG (ref 26.8–34.3)
MCHC RBC AUTO-ENTMCNC: 32.1 G/DL (ref 31.4–37.4)
MCV RBC AUTO: 94 FL (ref 82–98)
MONOCYTES # BLD AUTO: 1.24 THOUSAND/ΜL (ref 0.17–1.22)
MONOCYTES NFR BLD AUTO: 5 % (ref 4–12)
NEUTROPHILS # BLD AUTO: 19.29 THOUSANDS/ΜL (ref 1.85–7.62)
NEUTS SEG NFR BLD AUTO: 83 % (ref 43–75)
NRBC BLD AUTO-RTO: 0 /100 WBCS
PLATELET # BLD AUTO: 579 THOUSANDS/UL (ref 149–390)
PMV BLD AUTO: 9.5 FL (ref 8.9–12.7)
RBC # BLD AUTO: 3.2 MILLION/UL (ref 3.81–5.12)
WBC # BLD AUTO: 23.54 THOUSAND/UL (ref 4.31–10.16)

## 2018-02-10 PROCEDURE — 99024 POSTOP FOLLOW-UP VISIT: CPT | Performed by: SURGERY

## 2018-02-10 PROCEDURE — 74177 CT ABD & PELVIS W/CONTRAST: CPT

## 2018-02-10 PROCEDURE — 71260 CT THORAX DX C+: CPT

## 2018-02-10 PROCEDURE — C9113 INJ PANTOPRAZOLE SODIUM, VIA: HCPCS | Performed by: NURSE PRACTITIONER

## 2018-02-10 PROCEDURE — 82948 REAGENT STRIP/BLOOD GLUCOSE: CPT

## 2018-02-10 PROCEDURE — 85025 COMPLETE CBC W/AUTO DIFF WBC: CPT | Performed by: STUDENT IN AN ORGANIZED HEALTH CARE EDUCATION/TRAINING PROGRAM

## 2018-02-10 RX ADMIN — PANTOPRAZOLE SODIUM 40 MG: 40 INJECTION, POWDER, FOR SOLUTION INTRAVENOUS at 08:45

## 2018-02-10 RX ADMIN — METOPROLOL TARTRATE 10 MG: 1 INJECTION, SOLUTION INTRAVENOUS at 16:32

## 2018-02-10 RX ADMIN — HEPARIN SODIUM 5000 UNITS: 5000 INJECTION, SOLUTION INTRAVENOUS; SUBCUTANEOUS at 20:31

## 2018-02-10 RX ADMIN — Medication 3 G: at 20:31

## 2018-02-10 RX ADMIN — METOPROLOL TARTRATE 10 MG: 1 INJECTION, SOLUTION INTRAVENOUS at 03:14

## 2018-02-10 RX ADMIN — FLUCONAZOLE 400 MG: 2 INJECTION INTRAVENOUS at 16:32

## 2018-02-10 RX ADMIN — ONDANSETRON 4 MG: 2 INJECTION INTRAMUSCULAR; INTRAVENOUS at 11:26

## 2018-02-10 RX ADMIN — METOPROLOL TARTRATE 10 MG: 1 INJECTION, SOLUTION INTRAVENOUS at 10:05

## 2018-02-10 RX ADMIN — HEPARIN SODIUM 5000 UNITS: 5000 INJECTION, SOLUTION INTRAVENOUS; SUBCUTANEOUS at 08:45

## 2018-02-10 RX ADMIN — METOPROLOL TARTRATE 10 MG: 1 INJECTION, SOLUTION INTRAVENOUS at 21:17

## 2018-02-10 RX ADMIN — IOHEXOL 100 ML: 350 INJECTION, SOLUTION INTRAVENOUS at 13:51

## 2018-02-10 RX ADMIN — NORTRIPTYLINE HYDROCHLORIDE 10 MG: 10 CAPSULE ORAL at 08:45

## 2018-02-10 RX ADMIN — Medication 3 G: at 08:21

## 2018-02-10 RX ADMIN — Medication 3 G: at 14:30

## 2018-02-10 RX ADMIN — Medication 3 G: at 02:58

## 2018-02-10 NOTE — NURSING NOTE
Dr Sultana Patterson notified of persistent nausea and dry heaves despite IV Zofran approx  1hr 45 min ago  Order obtained for additional dose of Zofran 4mg IV  Tube feedings to continue at 40ml/hr

## 2018-02-10 NOTE — PROGRESS NOTES
Progress Note - General Surgery   Yonathan Flores 36 y o  female MRN: [id number]  Unit/Bed#: [facility] 909-01 Encounter: [id number]    Assessment:  36y o -year-old female with gastric perforation s/p hiatal hernia repair at outside hospital, s/p esophageal stent placement, exploratory laparotomy and washout  Has had persistent esophageal/gastric leak on imaging  Had bilateral pleural effusions which were drained  Perisplenic fluid collection was drained          Plan:  - advance TF's to 50  - continue to monitor for fevers  - rescan on 2/12  - unasyn/diflucan per ID until 2/19  - OOB/ambulate  - PT/OT  - SQH/SCDs  - dispo planning         Subjective/Objective   Chief Complaint:     Subjective: Abdominal soreness but no pain  No vomiting after TF's restarted, tolerating 40cc/hr  No fevers, wants ice chips/sips    Objective:     Blood pressure 155/78, pulse 83, temperature 97 9 °F (36 6 °C), temperature source Oral, resp  rate 18, height 5' 4" (1 626 m), weight 68 9 kg (152 lb), SpO2 96 %, not currently breastfeeding  ,Body mass index is 26 09 kg/m²        Intake/Output Summary (Last 24 hours) at 02/10/18 0718  Last data filed at 02/10/18 0631   Gross per 24 hour   Intake           835 45 ml   Output             3875 ml   Net         -3039 55 ml       Invasive Devices     Peripherally Inserted Central Catheter Line            PICC Line 28/36/02 Right Basilic 20 days          Drain            Closed/Suction Drain Left LLQ Bulb 19 Fr  15 days    Closed/Suction Drain Left LUQ Bulb 19 Fr  15 days    Closed/Suction Drain Right RLQ Bulb 19 Fr  15 days    Closed/Suction Drain Right RUQ Accordion 19 Fr  15 days    Closed/Suction Drain Left Back Bulb 12 Fr  9 days    NG/OG/Enteral Tube Enteral Feeding Tube Right nares 9 days                Physical Exam:   NAD  RRR  Norm resp effort  Abd soft, min distended, NT, ADENIKE x4 with very minimal output in bulbs, thin serosang  Perisplenic drain (LUQ) serous   TF's at 40      Lab, Imaging and other studies:  I have personally reviewed pertinent lab results    , CBC:   No results found for: WBC, HGB, HCT, MCV, PLT, ADJUSTEDWBC, MCH, MCHC, RDW, MPV, NRBC, CMP:   No results found for: NA, K, CL, CO2, ANIONGAP, BUN, CREATININE, GLUCOSE, CALCIUM, AST, ALT, ALKPHOS, PROT, ALBUMIN, BILITOT, EGFR  VTE Pharmacologic Prophylaxis: Sequential compression device (Venodyne)   VTE Mechanical Prophylaxis: sequential compression device

## 2018-02-11 LAB
ANION GAP SERPL CALCULATED.3IONS-SCNC: 8 MMOL/L (ref 4–13)
BASOPHILS # BLD AUTO: 0.06 THOUSANDS/ΜL (ref 0–0.1)
BASOPHILS NFR BLD AUTO: 0 % (ref 0–1)
BUN SERPL-MCNC: 18 MG/DL (ref 5–25)
CALCIUM SERPL-MCNC: 8.9 MG/DL (ref 8.3–10.1)
CHLORIDE SERPL-SCNC: 100 MMOL/L (ref 100–108)
CO2 SERPL-SCNC: 32 MMOL/L (ref 21–32)
CREAT SERPL-MCNC: 0.31 MG/DL (ref 0.6–1.3)
EOSINOPHIL # BLD AUTO: 0.28 THOUSAND/ΜL (ref 0–0.61)
EOSINOPHIL NFR BLD AUTO: 1 % (ref 0–6)
ERYTHROCYTE [DISTWIDTH] IN BLOOD BY AUTOMATED COUNT: 17.3 % (ref 11.6–15.1)
GFR SERPL CREATININE-BSD FRML MDRD: 139 ML/MIN/1.73SQ M
GLUCOSE SERPL-MCNC: 132 MG/DL (ref 65–140)
GLUCOSE SERPL-MCNC: 140 MG/DL (ref 65–140)
GLUCOSE SERPL-MCNC: 173 MG/DL (ref 65–140)
GLUCOSE SERPL-MCNC: 177 MG/DL (ref 65–140)
GLUCOSE SERPL-MCNC: 192 MG/DL (ref 65–140)
HCT VFR BLD AUTO: 31.7 % (ref 34.8–46.1)
HGB BLD-MCNC: 10 G/DL (ref 11.5–15.4)
LYMPHOCYTES # BLD AUTO: 1.79 THOUSANDS/ΜL (ref 0.6–4.47)
LYMPHOCYTES NFR BLD AUTO: 7 % (ref 14–44)
MCH RBC QN AUTO: 29.6 PG (ref 26.8–34.3)
MCHC RBC AUTO-ENTMCNC: 31.5 G/DL (ref 31.4–37.4)
MCV RBC AUTO: 94 FL (ref 82–98)
MONOCYTES # BLD AUTO: 1.45 THOUSAND/ΜL (ref 0.17–1.22)
MONOCYTES NFR BLD AUTO: 6 % (ref 4–12)
NEUTROPHILS # BLD AUTO: 22.55 THOUSANDS/ΜL (ref 1.85–7.62)
NEUTS SEG NFR BLD AUTO: 86 % (ref 43–75)
NRBC BLD AUTO-RTO: 0 /100 WBCS
PLATELET # BLD AUTO: 531 THOUSANDS/UL (ref 149–390)
PMV BLD AUTO: 9.6 FL (ref 8.9–12.7)
POTASSIUM SERPL-SCNC: 3.5 MMOL/L (ref 3.5–5.3)
RBC # BLD AUTO: 3.38 MILLION/UL (ref 3.81–5.12)
SODIUM SERPL-SCNC: 140 MMOL/L (ref 136–145)
WBC # BLD AUTO: 26.3 THOUSAND/UL (ref 4.31–10.16)

## 2018-02-11 PROCEDURE — 82948 REAGENT STRIP/BLOOD GLUCOSE: CPT

## 2018-02-11 PROCEDURE — 85025 COMPLETE CBC W/AUTO DIFF WBC: CPT | Performed by: STUDENT IN AN ORGANIZED HEALTH CARE EDUCATION/TRAINING PROGRAM

## 2018-02-11 PROCEDURE — 97112 NEUROMUSCULAR REEDUCATION: CPT

## 2018-02-11 PROCEDURE — 97116 GAIT TRAINING THERAPY: CPT

## 2018-02-11 PROCEDURE — 80048 BASIC METABOLIC PNL TOTAL CA: CPT | Performed by: STUDENT IN AN ORGANIZED HEALTH CARE EDUCATION/TRAINING PROGRAM

## 2018-02-11 PROCEDURE — C9113 INJ PANTOPRAZOLE SODIUM, VIA: HCPCS | Performed by: NURSE PRACTITIONER

## 2018-02-11 PROCEDURE — 97530 THERAPEUTIC ACTIVITIES: CPT

## 2018-02-11 PROCEDURE — 99024 POSTOP FOLLOW-UP VISIT: CPT | Performed by: SURGERY

## 2018-02-11 RX ORDER — MUSCLE RUB CREAM 100; 150 MG/G; MG/G
CREAM TOPICAL 4 TIMES DAILY PRN
Status: DISCONTINUED | OUTPATIENT
Start: 2018-02-11 | End: 2018-03-09 | Stop reason: HOSPADM

## 2018-02-11 RX ADMIN — Medication 3 G: at 01:45

## 2018-02-11 RX ADMIN — Medication 3 G: at 14:19

## 2018-02-11 RX ADMIN — METOPROLOL TARTRATE 10 MG: 1 INJECTION, SOLUTION INTRAVENOUS at 21:39

## 2018-02-11 RX ADMIN — HEPARIN SODIUM 5000 UNITS: 5000 INJECTION, SOLUTION INTRAVENOUS; SUBCUTANEOUS at 20:23

## 2018-02-11 RX ADMIN — METOPROLOL TARTRATE 10 MG: 1 INJECTION, SOLUTION INTRAVENOUS at 16:37

## 2018-02-11 RX ADMIN — FLUCONAZOLE 400 MG: 2 INJECTION INTRAVENOUS at 16:37

## 2018-02-11 RX ADMIN — MENTHOL, METHYL SALICYLATE: 10; 15 CREAM TOPICAL at 21:39

## 2018-02-11 RX ADMIN — INSULIN LISPRO 1 UNITS: 100 INJECTION, SOLUTION INTRAVENOUS; SUBCUTANEOUS at 12:45

## 2018-02-11 RX ADMIN — PANTOPRAZOLE SODIUM 40 MG: 40 INJECTION, POWDER, FOR SOLUTION INTRAVENOUS at 08:37

## 2018-02-11 RX ADMIN — Medication 3 G: at 20:23

## 2018-02-11 RX ADMIN — HEPARIN SODIUM 5000 UNITS: 5000 INJECTION, SOLUTION INTRAVENOUS; SUBCUTANEOUS at 08:36

## 2018-02-11 RX ADMIN — METOPROLOL TARTRATE 10 MG: 1 INJECTION, SOLUTION INTRAVENOUS at 09:31

## 2018-02-11 RX ADMIN — INSULIN LISPRO 1 UNITS: 100 INJECTION, SOLUTION INTRAVENOUS; SUBCUTANEOUS at 18:16

## 2018-02-11 RX ADMIN — INSULIN LISPRO 1 UNITS: 100 INJECTION, SOLUTION INTRAVENOUS; SUBCUTANEOUS at 06:33

## 2018-02-11 RX ADMIN — Medication 3 G: at 08:37

## 2018-02-11 RX ADMIN — METOPROLOL TARTRATE 10 MG: 1 INJECTION, SOLUTION INTRAVENOUS at 03:56

## 2018-02-11 NOTE — PHYSICAL THERAPY NOTE
Physical Therapy Progress Note     02/11/18 1125   Pain Assessment   Pain Assessment No/denies pain   Pain Score No Pain   Hospital Pain Intervention(s) Repositioned; Ambulation/increased activity; Emotional support   Response to Interventions Tolerated  Restrictions/Precautions   Other Precautions Fall Risk;Multiple lines   Subjective   Subjective The pt  states that she feels upset about her situation, and she is agreeable to work with therapy  Bed Mobility   Rolling L 3  Moderate assistance   Additional items Assist x 1; Increased time required;Verbal cues   Supine to Sit 3  Moderate assistance   Additional items Assist x 1; Increased time required;Verbal cues   Sit to Supine 3  Moderate assistance   Additional items Assist x 1; Increased time required;Verbal cues   Transfers   Sit to Stand 2  Maximal assistance   Additional items Assist x 1; Increased time required;Verbal cues   Stand to Sit 3  Moderate assistance   Additional items Assist x 1; Increased time required   Stand pivot 3  Moderate assistance   Additional items Assist x 1; Increased time required   Ambulation/Elevation   Gait pattern Excessively slow; Step to;Short stride; Inconsistent bree; Shuffling;Decreased foot clearance   Gait Assistance 4  Minimal assist   Additional items Assist x 1;Verbal cues   Assistive Device Rolling walker   Distance 6 feet, 4 feet  Wheelchair Activities   Wheelchair Cushion None   Pressure Relief Type Push up;Self adjusts   Level of Assistance for Pressure Relief Activities Minimal verbal cues   Wheelchair Parts Management Yes   Propulsion Yes   Propulsion Type 1 Manual   Level 1 Level tile   Method 1 Right upper extremity; Left upper extremity   Level of Assistance 1 Moderate assistance   Description/ Details 1 780 feet     Balance   Static Sitting Fair   Static Standing Fair -   Ambulatory Poor +   Activity Tolerance   Activity Tolerance Patient limited by fatigue;Patient tolerated treatment well   Nurse Made Juana Sood, RN    Exercises   TKR Sitting;Bilateral;AROM;5 reps  (x2 sets )   Assessment   Prognosis Good   Problem List Decreased strength;Decreased endurance; Impaired balance;Decreased mobility;Pain   Assessment The pt  was able to vastly improve her overall activity tolerance, but she continues to require significant assistance for transfers and gait  The pt  was very fearful of falling with mobility, but she was reassured with demonstration of safety techniques  She was able to ambulate a few feet, but she fatigued easily, and she became increasingly fearful of falling  She was able to assist with wheelchair propulsion, but she would only perform limitedly during the session  She became emotional during the session  She continues to remain significantly limited from her baseline, and she will benefit from continued physical therapy at discharge  Barriers to Discharge Inaccessible home environment;Decreased caregiver support   Goals   Patient Goals To regain her strength  STG Expiration Date 02/21/18   Treatment Day 5   Plan   Treatment/Interventions Functional transfer training;LE strengthening/ROM; Therapeutic exercise; Endurance training;Patient/family training;Bed mobility;Gait training   Progress Progressing toward goals   PT Frequency 5x/wk   Recommendation   Recommendation Post acute IP rehab   Equipment Recommended Wheelchair;Walker     Douglas Johnston, PTA

## 2018-02-11 NOTE — PROGRESS NOTES
Progress Note - Neurosurgery   Octavia Srinivasan 36 y o  female MRN: [id number]  Unit/Bed#: [facility] 909-01 Encounter: [id number]    Assessment:  1  POD # 5  removal of VPS  2  Procedure day 8 externalization of right-sided ventriculoperitoneal shunt  3  History of ventriculoperitoneal shunt for idiopathic intracranial hypertension (originally placed May 30, 2017)  4  Left upper extremity occlusive thrombophlebitis cephalic vein  5  Peritonitis   6  Gastric perforation status post repair         Plan:  - WBC trending up  - CT CAP shows prominent collection adjacent to the original esophageal injury  - asked to evaluate draining right chest incision    Subjective/Objective       No new complaints        Intake/Output       02/11/18 0701 - 02/12/18 0700      [id number] Total       Intake    P O   0  -- 0    Total Intake 0 -- 0       Output    Urine  500  -- 500    Urine 500 -- 500    Total Output 500 -- 500       Net I/O     -500 -- -500          Invasive Devices     Peripherally Inserted Central Catheter Line            PICC Line 95/68/60 Right Basilic 21 days          Drain            Closed/Suction Drain Left LLQ Bulb 19 Fr  16 days    Closed/Suction Drain Left LUQ Bulb 19 Fr  16 days    Closed/Suction Drain Right RLQ Bulb 19 Fr  16 days    Closed/Suction Drain Right RUQ Accordion 19 Fr  16 days    Closed/Suction Drain Left Back Bulb 12 Fr  11 days    NG/OG/Enteral Tube Enteral Feeding Tube Right nares 11 days                Physical Exam:     Vitals: Blood pressure 154/85, pulse 96, temperature 98 7 °F (37 1 °C), temperature source Oral, resp  rate 18, height 5' 4" (1 626 m), weight 67 4 kg (148 lb 9 4 oz), SpO2 96 %, not currently breastfeeding  ,Body mass index is 25 51 kg/m²  Awake and alert  incision on right upper chest   Dressing changed  No erythema  No fluctuance or meena draing      Lab Results: I have personally reviewed pertinent results        Imaging Studies: I have personally reviewed pertinent reports          VTE Pharmacologic Prophylaxis: Heparin    VTE Mechanical Prophylaxis: sequential compression device

## 2018-02-11 NOTE — PLAN OF CARE
Problem: PHYSICAL THERAPY ADULT  Goal: Performs mobility at highest level of function for planned discharge setting  See evaluation for individualized goals  Treatment/Interventions: Functional transfer training, LE strengthening/ROM, Therapeutic exercise, Endurance training, Bed mobility, Spoke to nursing  Equipment Recommended:  (R/O LEAST RESTRICTIVE AD )       See flowsheet documentation for full assessment, interventions and recommendations  Outcome: Progressing  Prognosis: Good  Problem List: Decreased strength, Decreased endurance, Impaired balance, Decreased mobility, Pain  Assessment: The pt  was able to vastly improve her overall activity tolerance, but she continues to require significant assistance for transfers and gait  The pt  was very fearful of falling with mobility, but she was reassured with demonstration of safety techniques  She was able to ambulate a few feet, but she fatigued easily, and she became increasingly fearful of falling  She was able to assist with wheelchair propulsion, but she would only perform limitedly during the session  She became emotional during the session  She continues to remain significantly limited from her baseline, and she will benefit from continued physical therapy at discharge  Barriers to Discharge: Inaccessible home environment, Decreased caregiver support     Recommendation: Post acute IP rehab     PT - OK to Discharge: Yes    See flowsheet documentation for full assessment

## 2018-02-11 NOTE — PROGRESS NOTES
Incision on R chest draining moderate amt of clear fluid  ABD applied, neurosurgery to see in AM  No new orders, continue to monitor

## 2018-02-11 NOTE — PROGRESS NOTES
Progress Note - General Surgery   Jayla August 36 y o  female MRN: [id number]  Unit/Bed#: [facility] 909-01 Encounter: [id number]    Assessment:  36y o -year-old female with gastric perforation s/p hiatal hernia repair at outside hospital, s/p esophageal stent placement, exploratory laparotomy and washout  Has had persistent esophageal/gastric leak on imaging  Had bilateral pleural effusions which were drained  Perisplenic fluid collection was drained          Plan:  - advance TF's to goal  - continue to monitor for fevers  - IR consult for drain removal/adjustment, possible placement into enlarging collection seen on CT on 2/10  - unasyn/diflucan per ID until 2/19  - OOB/ambulate  - PT/OT  - SQH/SCDs  - dispo planning         Subjective/Objective   Chief Complaint:     Subjective: Abdominal soreness but no pain  Denies fever/chills, tolerating tube feeds at 55  CT results discussed with patient  Objective:     Blood pressure 170/90, pulse 101, temperature 99 °F (37 2 °C), temperature source Oral, resp  rate 18, height 5' 4" (1 626 m), weight 67 4 kg (148 lb 9 4 oz), SpO2 98 %, not currently breastfeeding  ,Body mass index is 25 51 kg/m²  Intake/Output Summary (Last 24 hours) at 02/11/18 0652  Last data filed at 02/11/18 0501   Gross per 24 hour   Intake              220 ml   Output             3180 ml   Net            -2960 ml       Invasive Devices     Peripherally Inserted Central Catheter Line            PICC Line 25/33/82 Right Basilic 21 days          Drain            Closed/Suction Drain Left LLQ Bulb 19 Fr  16 days    Closed/Suction Drain Left LUQ Bulb 19 Fr  16 days    Closed/Suction Drain Right RLQ Bulb 19 Fr  16 days    Closed/Suction Drain Right RUQ Accordion 19 Fr  16 days    Closed/Suction Drain Left Back Bulb 12 Fr   10 days    NG/OG/Enteral Tube Enteral Feeding Tube Right nares 10 days                Physical Exam:   NAD  RRR  Norm resp effort  Abd soft, distended, NT, ADENIKE x4 with very minimal output in bulbs, thin serosang  Perisplenic drain (LUQ) serosang  TF's at 55      Lab, Imaging and other studies:  I have personally reviewed pertinent lab results    , CBC:   Lab Results   Component Value Date    WBC 26 30 (H) 02/11/2018    HGB 10 0 (L) 02/11/2018    HCT 31 7 (L) 02/11/2018    MCV 94 02/11/2018     (H) 02/11/2018    MCH 29 6 02/11/2018    MCHC 31 5 02/11/2018    RDW 17 3 (H) 02/11/2018    MPV 9 6 02/11/2018    NRBC 0 02/11/2018   , CMP:   Lab Results   Component Value Date     02/11/2018    K 3 5 02/11/2018     02/11/2018    CO2 32 02/11/2018    ANIONGAP 8 02/11/2018    BUN 18 02/11/2018    CREATININE 0 31 (L) 02/11/2018    GLUCOSE 140 02/11/2018    CALCIUM 8 9 02/11/2018    EGFR 139 02/11/2018     VTE Pharmacologic Prophylaxis: Sequential compression device (Venodyne)   VTE Mechanical Prophylaxis: sequential compression device

## 2018-02-12 ENCOUNTER — APPOINTMENT (INPATIENT)
Dept: RADIOLOGY | Facility: HOSPITAL | Age: 43
DRG: 711 | End: 2018-02-12
Payer: COMMERCIAL

## 2018-02-12 LAB
ANION GAP SERPL CALCULATED.3IONS-SCNC: 7 MMOL/L (ref 4–13)
BASOPHILS # BLD AUTO: 0.04 THOUSANDS/ΜL (ref 0–0.1)
BASOPHILS NFR BLD AUTO: 0 % (ref 0–1)
BUN SERPL-MCNC: 19 MG/DL (ref 5–25)
CALCIUM SERPL-MCNC: 9.3 MG/DL (ref 8.3–10.1)
CHLORIDE SERPL-SCNC: 102 MMOL/L (ref 100–108)
CO2 SERPL-SCNC: 33 MMOL/L (ref 21–32)
CREAT SERPL-MCNC: 0.37 MG/DL (ref 0.6–1.3)
EOSINOPHIL # BLD AUTO: 0.14 THOUSAND/ΜL (ref 0–0.61)
EOSINOPHIL NFR BLD AUTO: 1 % (ref 0–6)
ERYTHROCYTE [DISTWIDTH] IN BLOOD BY AUTOMATED COUNT: 17.3 % (ref 11.6–15.1)
GFR SERPL CREATININE-BSD FRML MDRD: 131 ML/MIN/1.73SQ M
GLUCOSE SERPL-MCNC: 132 MG/DL (ref 65–140)
GLUCOSE SERPL-MCNC: 152 MG/DL (ref 65–140)
GLUCOSE SERPL-MCNC: 160 MG/DL (ref 65–140)
GLUCOSE SERPL-MCNC: 186 MG/DL (ref 65–140)
GLUCOSE SERPL-MCNC: 198 MG/DL (ref 65–140)
HCT VFR BLD AUTO: 30.8 % (ref 34.8–46.1)
HGB BLD-MCNC: 9.6 G/DL (ref 11.5–15.4)
LYMPHOCYTES # BLD AUTO: 1.66 THOUSANDS/ΜL (ref 0.6–4.47)
LYMPHOCYTES NFR BLD AUTO: 7 % (ref 14–44)
MAGNESIUM SERPL-MCNC: 2.2 MG/DL (ref 1.6–2.6)
MCH RBC QN AUTO: 29.4 PG (ref 26.8–34.3)
MCHC RBC AUTO-ENTMCNC: 31.2 G/DL (ref 31.4–37.4)
MCV RBC AUTO: 94 FL (ref 82–98)
MONOCYTES # BLD AUTO: 1.3 THOUSAND/ΜL (ref 0.17–1.22)
MONOCYTES NFR BLD AUTO: 6 % (ref 4–12)
NEUTROPHILS # BLD AUTO: 19.43 THOUSANDS/ΜL (ref 1.85–7.62)
NEUTS SEG NFR BLD AUTO: 86 % (ref 43–75)
NRBC BLD AUTO-RTO: 0 /100 WBCS
PLATELET # BLD AUTO: 517 THOUSANDS/UL (ref 149–390)
PMV BLD AUTO: 9.8 FL (ref 8.9–12.7)
POTASSIUM SERPL-SCNC: 3.2 MMOL/L (ref 3.5–5.3)
PREALB SERPL-MCNC: 13.5 MG/DL (ref 18–40)
RBC # BLD AUTO: 3.27 MILLION/UL (ref 3.81–5.12)
SODIUM SERPL-SCNC: 142 MMOL/L (ref 136–145)
WBC # BLD AUTO: 22.66 THOUSAND/UL (ref 4.31–10.16)

## 2018-02-12 PROCEDURE — C9113 INJ PANTOPRAZOLE SODIUM, VIA: HCPCS | Performed by: NURSE PRACTITIONER

## 2018-02-12 PROCEDURE — C1769 GUIDE WIRE: HCPCS

## 2018-02-12 PROCEDURE — 87077 CULTURE AEROBIC IDENTIFY: CPT | Performed by: RADIOLOGY

## 2018-02-12 PROCEDURE — 10160 PNXR ASPIR ABSC HMTMA BULLA: CPT | Performed by: RADIOLOGY

## 2018-02-12 PROCEDURE — 85025 COMPLETE CBC W/AUTO DIFF WBC: CPT | Performed by: SURGERY

## 2018-02-12 PROCEDURE — 99232 SBSQ HOSP IP/OBS MODERATE 35: CPT | Performed by: SURGERY

## 2018-02-12 PROCEDURE — 83735 ASSAY OF MAGNESIUM: CPT | Performed by: SURGERY

## 2018-02-12 PROCEDURE — 82948 REAGENT STRIP/BLOOD GLUCOSE: CPT

## 2018-02-12 PROCEDURE — 99233 SBSQ HOSP IP/OBS HIGH 50: CPT | Performed by: INTERNAL MEDICINE

## 2018-02-12 PROCEDURE — 99152 MOD SED SAME PHYS/QHP 5/>YRS: CPT

## 2018-02-12 PROCEDURE — 99153 MOD SED SAME PHYS/QHP EA: CPT

## 2018-02-12 PROCEDURE — 80048 BASIC METABOLIC PNL TOTAL CA: CPT | Performed by: SURGERY

## 2018-02-12 PROCEDURE — 0W9G3ZX DRAINAGE OF PERITONEAL CAVITY, PERCUTANEOUS APPROACH, DIAGNOSTIC: ICD-10-PCS | Performed by: RADIOLOGY

## 2018-02-12 PROCEDURE — 77012 CT SCAN FOR NEEDLE BIOPSY: CPT | Performed by: RADIOLOGY

## 2018-02-12 PROCEDURE — 87186 SC STD MICRODIL/AGAR DIL: CPT | Performed by: RADIOLOGY

## 2018-02-12 PROCEDURE — 87205 SMEAR GRAM STAIN: CPT | Performed by: RADIOLOGY

## 2018-02-12 PROCEDURE — 87070 CULTURE OTHR SPECIMN AEROBIC: CPT | Performed by: RADIOLOGY

## 2018-02-12 PROCEDURE — 77012 CT SCAN FOR NEEDLE BIOPSY: CPT

## 2018-02-12 PROCEDURE — 87147 CULTURE TYPE IMMUNOLOGIC: CPT | Performed by: RADIOLOGY

## 2018-02-12 PROCEDURE — 10022 HB FNA W/IMAGE: CPT

## 2018-02-12 PROCEDURE — 84134 ASSAY OF PREALBUMIN: CPT | Performed by: SURGERY

## 2018-02-12 PROCEDURE — 99024 POSTOP FOLLOW-UP VISIT: CPT | Performed by: SURGERY

## 2018-02-12 RX ORDER — MIDAZOLAM HYDROCHLORIDE 1 MG/ML
INJECTION INTRAMUSCULAR; INTRAVENOUS CODE/TRAUMA/SEDATION MEDICATION
Status: COMPLETED | OUTPATIENT
Start: 2018-02-12 | End: 2018-02-12

## 2018-02-12 RX ORDER — FENTANYL CITRATE 50 UG/ML
INJECTION, SOLUTION INTRAMUSCULAR; INTRAVENOUS CODE/TRAUMA/SEDATION MEDICATION
Status: COMPLETED | OUTPATIENT
Start: 2018-02-12 | End: 2018-02-12

## 2018-02-12 RX ORDER — POTASSIUM CHLORIDE 20MEQ/15ML
80 LIQUID (ML) ORAL ONCE
Status: COMPLETED | OUTPATIENT
Start: 2018-02-12 | End: 2018-02-12

## 2018-02-12 RX ADMIN — INSULIN LISPRO 1 UNITS: 100 INJECTION, SOLUTION INTRAVENOUS; SUBCUTANEOUS at 12:12

## 2018-02-12 RX ADMIN — METOPROLOL TARTRATE 10 MG: 1 INJECTION, SOLUTION INTRAVENOUS at 04:35

## 2018-02-12 RX ADMIN — PANTOPRAZOLE SODIUM 40 MG: 40 INJECTION, POWDER, FOR SOLUTION INTRAVENOUS at 09:34

## 2018-02-12 RX ADMIN — INSULIN LISPRO 1 UNITS: 100 INJECTION, SOLUTION INTRAVENOUS; SUBCUTANEOUS at 06:11

## 2018-02-12 RX ADMIN — Medication 3 G: at 01:10

## 2018-02-12 RX ADMIN — Medication 3 G: at 20:17

## 2018-02-12 RX ADMIN — Medication 3 G: at 13:50

## 2018-02-12 RX ADMIN — METOPROLOL TARTRATE 10 MG: 1 INJECTION, SOLUTION INTRAVENOUS at 21:26

## 2018-02-12 RX ADMIN — FENTANYL CITRATE 50 MCG: 50 INJECTION INTRAMUSCULAR; INTRAVENOUS at 16:13

## 2018-02-12 RX ADMIN — INSULIN LISPRO 1 UNITS: 100 INJECTION, SOLUTION INTRAVENOUS; SUBCUTANEOUS at 01:10

## 2018-02-12 RX ADMIN — FENTANYL CITRATE 50 MCG: 50 INJECTION INTRAMUSCULAR; INTRAVENOUS at 16:20

## 2018-02-12 RX ADMIN — MIDAZOLAM 1 MG: 1 INJECTION INTRAMUSCULAR; INTRAVENOUS at 16:13

## 2018-02-12 RX ADMIN — Medication 3 G: at 08:08

## 2018-02-12 RX ADMIN — METOPROLOL TARTRATE 10 MG: 1 INJECTION, SOLUTION INTRAVENOUS at 09:35

## 2018-02-12 RX ADMIN — HEPARIN SODIUM 5000 UNITS: 5000 INJECTION, SOLUTION INTRAVENOUS; SUBCUTANEOUS at 21:30

## 2018-02-12 RX ADMIN — FENTANYL CITRATE 50 MCG: 50 INJECTION INTRAMUSCULAR; INTRAVENOUS at 16:42

## 2018-02-12 RX ADMIN — HEPARIN SODIUM 5000 UNITS: 5000 INJECTION, SOLUTION INTRAVENOUS; SUBCUTANEOUS at 09:35

## 2018-02-12 RX ADMIN — POTASSIUM CHLORIDE 80 MEQ: 20 SOLUTION ORAL at 09:35

## 2018-02-12 RX ADMIN — METOPROLOL TARTRATE 10 MG: 1 INJECTION, SOLUTION INTRAVENOUS at 17:22

## 2018-02-12 RX ADMIN — FLUCONAZOLE 400 MG: 2 INJECTION INTRAVENOUS at 17:19

## 2018-02-12 RX ADMIN — MIDAZOLAM 1 MG: 1 INJECTION INTRAMUSCULAR; INTRAVENOUS at 16:23

## 2018-02-12 NOTE — PROGRESS NOTES
Patient care rounds were completed with the patient's nurse today, Shahnaz Carrillo  We discussed the plan is to proceed with IR re-evaluation for drain check and assess for drainage of additional collection noted on recent CT scan  Following IR evaluation, will plan to resume tube feeds via keofed tube  Continue IV antibiotics/antifungal some per Infectious Disease recommendations  Continue current analgesic regimen  We reviewed all of the invasive devices/lines/telemetry orders   - Continue right PICC line for anticipated long-term IV antibiotics  Pain Assessment / Plan:  - Continue current analgesic regimen  Mobility Assessment / Plan:  - Out of bed as tolerated  -  PT and OT evaluation and treatment as indicated with recommendations for rehab on discharge  Goals / Barriers for discharge:  - Further intervention for gastric leak/perforation prior to patient being ready for discharge  - Will reassess following IR to determine if step-down status still needed  -  Case management following  All questions and concerns were addressed  I spent greater than 15 minutes reviewing the plan with the patient and the nurse, and coordinating he care for the day      Brad Lemon PA-C  2/12/2018 09:45 AM

## 2018-02-12 NOTE — PROGRESS NOTES
Progress Note - General Surgery   Jayla August 36 y o  female MRN: [id number]  Unit/Bed#: [facility] 909-01 Encounter: [id number]    Assessment:  36y o -year-old female with gastric perforation s/p hiatal hernia repair at outside hospital, s/p esophageal stent placement, exploratory laparotomy and washout  Has had persistent esophageal/gastric leak on imaging  Had bilateral pleural effusions which were drained  Perisplenic fluid collection was drained          Plan:  - continue TF's at goal, no oral diet  - continue to monitor for fevers  - IR consult for drain removal/adjustment, possible placement into enlarging collection seen on CT on 2/10  - unasyn/diflucan per ID until 2/19  - OOB/ambulate  - PT/OT  - SQH/SCDs  - dispo planning         Subjective/Objective   Chief Complaint:     Subjective: Abdominal soreness but denies pain at rest  Denies fever/chills, tolerating tube feeds at goal with no nausea vomiting  IR drainage was not done yesterday  Objective:     Blood pressure 161/89, pulse (!) 109, temperature 98 8 °F (37 1 °C), temperature source Oral, resp  rate 16, height 5' 4" (1 626 m), weight 67 4 kg (148 lb 9 4 oz), SpO2 91 %, not currently breastfeeding  ,Body mass index is 25 51 kg/m²  Intake/Output Summary (Last 24 hours) at 02/12/18 0602  Last data filed at 02/12/18 0022   Gross per 24 hour   Intake                0 ml   Output             3410 ml   Net            -3410 ml       Invasive Devices     Peripherally Inserted Central Catheter Line            PICC Line 60/78/36 Right Basilic 22 days          Drain            Closed/Suction Drain Left LLQ Bulb 19 Fr  17 days    Closed/Suction Drain Left LUQ Bulb 19 Fr  17 days    Closed/Suction Drain Right RLQ Bulb 19 Fr  17 days    Closed/Suction Drain Right RUQ Accordion 19 Fr   17 days    Closed/Suction Drain Left Back Bulb 12 Fr  11 days    NG/OG/Enteral Tube Enteral Feeding Tube Right nares 11 days                Physical Exam:   NAD  RRR  Norm resp effort  Abd soft, distended, NT, ADENIKE x4 with very minimal output in bulbs, thin serosang  Perisplenic drain (LUQ) serosang  TF's at 58 and tolerating      Lab, Imaging and other studies:  I have personally reviewed pertinent lab results    , CBC:   No results found for: WBC, HGB, HCT, MCV, PLT, ADJUSTEDWBC, MCH, MCHC, RDW, MPV, NRBC, CMP:   No results found for: NA, K, CL, CO2, ANIONGAP, BUN, CREATININE, GLUCOSE, CALCIUM, AST, ALT, ALKPHOS, PROT, ALBUMIN, BILITOT, EGFR  VTE Pharmacologic Prophylaxis: Sequential compression device (Venodyne)   VTE Mechanical Prophylaxis: sequential compression device

## 2018-02-12 NOTE — PROGRESS NOTES
Rounded with Gordon Davila, Plan is to go to IR for drain eval  Follow up with ID recommendations, continue keofed, and work with therapy

## 2018-02-12 NOTE — BRIEF OP NOTE (RAD/CATH)
Image guided aspiration of left retroperitoneal collection Procedure Note    PATIENT NAME: Sally Garcia  : 1975  MRN: [id number]     Pre-op Diagnosis:   1  Gastric leak    2   (ventriculoperitoneal) shunt status    3  Peritonitis ([address] )    4  Acute peritonitis ([address] )    5  S/P  shunt    6  Idiopathic intracranial hypertension    7  Mild single current episode of major depressive disorder (HCC)      Post-op Diagnosis:   1  Gastric leak    2   (ventriculoperitoneal) shunt status    3  Peritonitis ([address] )    4  Acute peritonitis ([address] )    5  S/P  shunt    6  Idiopathic intracranial hypertension    7  Mild single current episode of major depressive disorder [facility])        Surgeon:   Jairon Bradford MD  Assistants:     No qualified resident was available, Resident is only observing    Estimated Blood Loss:  Minimal, less than 1 mL  Findings:  Small left retroperitoneal fluid collection, presumed abscess  Successful placement of 5 [place] catheter in the collection with return of green purulent fluid  Attempt at placing a drain was unsuccessful due to the small size of the pocket  The drainage catheter would backup out of the collection  Successful placement of 2nd 5 [place] catheter in the collection which was subsequently aspirated to completion, for total of 17 mL purulent green fluid  Patient tolerated the procedure well  Post procedure CT demonstrates resolution of the fluid collection  No evidence of bowel perforation      Specimens:  5 mL purulent green fluid    Complications:  Nothing immediately apparent    Anesthesia: Conscious sedation and Local    Jairon Bradford MD     Date: 2018  Time: 4:55 PM

## 2018-02-12 NOTE — CASE MANAGEMENT
Continued Stay Review    Date: 2/10    Vital Signs: Blood pressure 155/78, pulse 83, temperature 97 9 °F (36 6 °C), temperature source Oral, resp  rate 18, height 5' 4" (1 626 m), weight 68 9 kg (152 lb), SpO2 96 %, not currently breastfeeding  ,Body mass index is 26 09 kg/m²  Medications:   Scheduled Meds:   Current Facility-Administered Medications:  ampicillin-sulbactam 3 g Intravenous Q6H   fluconazole 400 mg Intravenous Q24H   gabapentin 300 mg Oral HS   heparin (porcine) 5,000 Units Subcutaneous Q12H [address]   insulin lispro 1-5 Units Subcutaneous Q6H [address]   metoprolol 10 mg Intravenous Q6H   mirtazapine 15 mg Oral HS   nortriptyline 10 mg Oral BID   pantoprazole 40 mg Intravenous Q24H MYRNA     Continuous Infusions:    PRN Meds:   acetaminophen    bisacodyl    HYDROmorphone    menthol-methyl salicylate    ondansetron    Abnormal Labs/Diagnostic Results:    02/10/18 1103    WBC 4 31 - 10 16 Thousand/uL 23 54     RBC 3 81 - 5 12 Million/uL 3 20     Hemoglobin 11 5 - 15 4 g/dL 9 7     Hematocrit 34 8 - 46 1 % 30 2     MCV 82 - 98 fL 94    MCH 26 8 - 34 3 pg 30 3    MCHC 31 4 - 37 4 g/dL 32 1    RDW 11 6 - 15 1 % 17 1     MPV 8 9 - 12 7 fL 9 5    Platelets 127 - 541 Thousands/uL 579         Age/Sex: 36 y o  female       Assessment:  36y o -year-old female with gastric perforation s/p hiatal hernia repair at outside hospital, s/p esophageal stent placement, exploratory laparotomy and washout  Has had persistent esophageal/gastric leak on imaging  Had bilateral pleural effusions which were drained  Perisplenic fluid collection was drained         Plan:  - advance TF's to 50  - continue to monitor for fevers  - rescan on 2/12  - unasyn/diflucan per ID until 2/19  - OOB/ambulate  - PT/OT  - SQH/SCDs  - dispo planning    Discharge Plan: Patient would like to d/c home, but recommendations continue to be for rehab

## 2018-02-12 NOTE — PROGRESS NOTES
Progress Note - Bariatric Surgery   Isidro García 36 y o  female MRN: [id number]  Unit/Bed#: [facility] 909-01 Encounter: [id number]    Assessment:  37 yo F s/p hiatal hernia repair 2/60 complicated by gastric leak/perforation, now s/p laparoscopic washout & ?Angelita Fly repair (per patient as no document in chart) which subsequently failed and leaked, s/p esophagogastric endoluminal stenting by GI, s/p ex lap washout by general surgical service at [facility]  Appears to reflux around distal portion of stent as contrast extrav on UGI (1/28) and CTCAP with large fluid collection in perisplenic location (1/30)  CTCAP repeated over the weekend for persistent leukocytosis  Large air & fluid collection in LUQ remains  IR consulted for drain adjustment  Tube feeds currently at goal      I discussed the findings with the patient at bedside and all questions were answered  I reiterated the minimum of 6 weeks with this endoluminal stent to allow healing of her gastrotomy and further imaging studies to evaluate  She was very concerned about her current didi weakness/deconditioning  I discussed the aggressive PT and OOB required as her course continues  Plan:  - cont NPO/IVF  - optimize protein nutrition supplementation to allow best chance to heal gastrotomy; tolerating tube feeds at goal  - Endoluminal stent - to remain for at least 6 weeks; ok to repeat UGI from bariatric standpoint as discussed with primary team  - persistent leukocytosis - cont to trend WBC; IV abx/antifungals per primary; drains serosang  - IR consulted for drain adjustment in LUQ  - will continue to follow    Subjective/Objective   Chief Complaint: weak when walking    Subjective: -n/v, +f/bm    Objective:     Vitals: Blood pressure 158/84, pulse 98, temperature 98 5 °F (36 9 °C), temperature source Oral, resp  rate 16, height 5' 4" (1 626 m), weight 67 4 kg (148 lb 9 4 oz), SpO2 96 %, not currently breastfeeding  ,Body mass index is 25 51 kg/m²  Intake/Output Summary (Last 24 hours) at 02/12/18 0913  Last data filed at 02/12/18 0834   Gross per 24 hour   Intake              140 ml   Output             3730 ml   Net            -3590 ml       Invasive Devices     Peripherally Inserted Central Catheter Line            PICC Line 03/80/98 Right Basilic 22 days          Drain            Closed/Suction Drain Left LLQ Bulb 19 Fr  17 days    Closed/Suction Drain Left LUQ Bulb 19 Fr  17 days    Closed/Suction Drain Right RLQ Bulb 19 Fr  17 days    Closed/Suction Drain Right RUQ Accordion 19 Fr  17 days    Closed/Suction Drain Left Back Bulb 12 Fr  12 days    NG/OG/Enteral Tube Enteral Feeding Tube Right nares 11 days                Physical Exam: abd s/nt/nd, drains serosang, -r/g    Lab, Imaging and other studies:   I have personally reviewed pertinent labs    CBC:   Lab Results   Component Value Date    WBC 22 66 (H) 02/12/2018    HGB 9 6 (L) 02/12/2018    HCT 30 8 (L) 02/12/2018    MCV 94 02/12/2018     (H) 02/12/2018    MCH 29 4 02/12/2018    MCHC 31 2 (L) 02/12/2018    RDW 17 3 (H) 02/12/2018    MPV 9 8 02/12/2018    NRBC 0 02/12/2018     CMP:   Lab Results   Component Value Date     02/12/2018     02/12/2018    CO2 33 (H) 02/12/2018    ANIONGAP 7 02/12/2018    BUN 19 02/12/2018    CREATININE 0 37 (L) 02/12/2018    GLUCOSE 160 (H) 02/12/2018    CALCIUM 9 3 02/12/2018    EGFR 131 02/12/2018     VTE Pharmacologic Prophylaxis: Sequential compression device (Venodyne)  and Heparin  VTE Mechanical Prophylaxis: sequential compression device

## 2018-02-12 NOTE — POST OP PROGRESS NOTES
Progress Note - Neurosurgery   Jayla Ro 36 y o  female MRN: [id number]  Unit/Bed#: [facility] 909-01 Encounter: [id number]    Assessment:  1  POD6 removal of VPS  2  Procedure day 13 externalization of right-sided ventriculoperitoneal shunt  3  History of ventriculoperitoneal shunt for idiopathic intracranial hypertension (originally placed May 30, 2017)  4  Left upper extremity occlusive thrombophlebitis cephalic vein  5  Peritonitis   6  Gastric perforation status post repair    Plan:  48-year-old female transfer [facility] for esophageal stent status post upper gastric injury during laparoscopic paraesophageal hernia repair  Patient developed sepsis and required multiple operative procedures  Upon transfer to [facility], she underwent a esophageal stent and externalization ventriculoperitoneal shunt which has since been completed removed  · Exam reveals with diffuse weakness and limited effort interactions  Arousable to voice and slow to answer questions though responses appropriate  Following commands  · Scalp Incision CDI  Right chest incision into with small amount of clear drainage from lateral pole with applied pressure  · Attempted to see patient to apply additional suture but she was off floor for IR  · Ophthalmology evaluation completed  No optic nerve edema on exam today  Choroidal nevus right eye-routine follow-up  · Pain control per primary team   · Ongoing medical management per primary team     · ADENIKE drains   · Trend white count 22 66 as of 2/12/18  · Status post IR perisplenic drain placement bilateral chest tubes for pleural effusion along with Keofed placement  · Infectious disease following - continue on IV Unasyn anticipated 2-3 weeks  Continue fluconazole  · Mobilized out of bed  Encouraged mobilization out of bed  · DVT PPX:  Heparin and SCDs on during exam   · No further neurosurgical intervention anticipated at this juncture  · Will follow incision in rachelle Velez Continues to drain will place duration all sutures  Subjective/Objective   Chief Complaint:  Follow-up incisional drainage    Subjective:  Patient denies headache or vision changes  Admits to diffuse weakness and abdominal pain  Objective:  Patient lying bed sleeping  I/O       02/10 0701 - 02/11 0700 02/11 0701 - 02/12 0700 02/12 0701 - 02/13 0700    P  O  0 0     I V  (mL/kg)       NG/GT       IV Piggyback   140    TPN       Feedings 220      Total Intake(mL/kg) 220 (3 3) 0 (0) 140 (2 1)    Urine (mL/kg/hr) 3325 (2 1) 3475 (2 1) 525 (1 5)    Drains 105 (0 1) 130 (0 1)     Stool 0 (0)      Total Output 3430 3605 525    Net -3210 -3605 -385           Unmeasured Stool Occurrence 2 x            Invasive Devices     Peripherally Inserted Central Catheter Line            PICC Line 73/64/55 Right Basilic 22 days          Drain            Closed/Suction Drain Left LLQ Bulb 19 Fr  17 days    Closed/Suction Drain Left LUQ Bulb 19 Fr  17 days    Closed/Suction Drain Right RLQ Bulb 19 Fr  17 days    Closed/Suction Drain Right RUQ Accordion 19 Fr  17 days    Closed/Suction Drain Left Back Bulb 12 Fr  12 days    NG/OG/Enteral Tube Enteral Feeding Tube Right nares 12 days                Physical Exam:  Vitals: Blood pressure 160/84, pulse 88, temperature 98 5 °F (36 9 °C), temperature source Oral, resp  rate 18, height 5' 4" (1 626 m), weight 67 4 kg (148 lb 9 4 oz), SpO2 97 %, not currently breastfeeding  ,Body mass index is 25 51 kg/m²      General appearance:  Sleeping but arousable to voice, appears stated age, the limited effort and interaction and no distress  Head: Normocephalic, without obvious abnormality, incision clean dry intact  Eyes: PERRL  Neck: supple, symmetrical, trachea midline   Lungs: non labored breathing, right chest wall incision with clear drainage one applied pressure to lateral pole  Heart: regular heart rate  Neurologic:   Mental status:  Arousable to voice, thought content appropriate  Sensory: normal to LT  Motor: moving all extremities with diffuse weakness    Lab Results:    Results from last 7 days  Lab Units 02/12/18  0443 02/11/18  0450 02/10/18  1103   WBC Thousand/uL 22 66* 26 30* 23 54*   HEMOGLOBIN g/dL 9 6* 10 0* 9 7*   HEMATOCRIT % 30 8* 31 7* 30 2*   PLATELETS Thousands/uL 517* 531* 579*   NEUTROS PCT % 86* 86* 83*   MONOS PCT % 6 6 5       Results from last 7 days  Lab Units 02/12/18  0443 02/11/18  0450 02/09/18  0453   SODIUM mmol/L 142 140 141   POTASSIUM mmol/L 3 2* 3 5 3 8   CHLORIDE mmol/L 102 100 102   CO2 mmol/L 33* 32 32   BUN mg/dL 19 18 19   CREATININE mg/dL 0 37* 0 31* 0 31*   CALCIUM mg/dL 9 3 8 9 8 6   GLUCOSE RANDOM mg/dL 160* 140 153*       Results from last 7 days  Lab Units 02/12/18  0443 02/09/18  0453 02/08/18  0452   MAGNESIUM mg/dL 2 2 2 2 2 2       Results from last 7 days  Lab Units 02/09/18  0453 02/08/18  0452   PHOSPHORUS mg/dL 3 7 3 4         No results found for: TROPONINT  ABG:  Lab Results   Component Value Date    PHART 7 484 (H) 01/25/2018    DOY8ZOC 33 6 (L) 01/25/2018    PO2ART 165 1 (H) 01/25/2018    ODQ6OUY 24 7 01/25/2018    BEART 1 6 01/25/2018    SOURCE Line, Arterial 01/25/2018       Imaging Studies: I have personally reviewed pertinent reports  and I have personally reviewed pertinent films in PACS    EKG, Pathology, and Other Studies: I have personally reviewed pertinent reports        VTE Pharmacologic Prophylaxis: Heparin    VTE Mechanical Prophylaxis: sequential compression device

## 2018-02-12 NOTE — CASE MANAGEMENT
Continued Stay Review    Date: 2/5/2018    Vital Signs: /84 (BP Location: Right arm)   Pulse 88   Temp 98 5 °F (36 9 °C) (Oral)   Resp 18   Ht 5' 4" (1 626 m)   Wt 67 4 kg (148 lb 9 4 oz)   LMP  (LMP Unknown)   SpO2 97%   Breastfeeding? No   BMI 25 51 kg/m²     Medications:   Scheduled Meds:     Current Facility-Administered Medications:  ampicillin-sulbactam 3 g Intravenous Q6H   fluconazole 400 mg Intravenous Q24H   gabapentin 300 mg Oral HS   heparin (porcine) 5,000 Units Subcutaneous Q12H [facility]   insulin lispro 1-5 Units Subcutaneous Q6H [facility]   menthol-methyl salicylate  Apply externally 4x Daily PRN   metoprolol 10 mg Intravenous Q6H   mirtazapine 15 mg Oral HS   nortriptyline 10 mg Oral BID   pantoprazole 40 mg Intravenous Q24H MYRNA     Continuous Infusions:    PRN Meds:   acetaminophen    bisacodyl    HYDROmorphone    menthol-methyl salicylate    ondansetron    Abnormal Labs/Diagnostic Results: wbc 26 68--hgb 9 0/28 0--platlets 640  Na 135--cl 97--c02 34--cr 0 28    Age/Sex: 36 y o  female     Assessment/Plan: or--Patient returned from OR status post uncomplicated  shunt SBOPEOC 6/1  Patient is arousable and responsive but remains sedated  She underwent a procedure for reduction of obesity at [facility]   Unfortunately there was leakage from one of the anastomoses and the patient developed peritonitis  She has a indwelling ventriculo peritoneal shunt for pseudotumor cerebri  After arriving at [facility] the system was externalized at the clavicle  Ophthalmology was consulted and the retina and discs are noted to be flat without any palpable edema  Our plan will be to remove the entire system and replaced at only if absolutely necessary    SURGERY DATE: 2/5/2018     Surgeon(s) and Role:     * Clarisse Bazan MD - Primary     Preop Diagnosis:   (ventriculoperitoneal) shunt status [Z98 2]     Post-Op Diagnosis Codes:     *  (ventriculoperitoneal) shunt status [Z98 2]     Procedure(s) (LRB):  Removal of  shunt (Right)    Discharge Plan: cm following

## 2018-02-12 NOTE — PROGRESS NOTES
Vascular and Interventional Radiology Pre Procedure Note    Diagnosis:  Left retroperitoneal fluid collection, presumed abscess    Procedure:  Image guided aspiration, possible drainage, possible intervention of fluid collection in left retroperitoneum    HPI: 70-year-old female with gastric perforation status post hiatal hernia repair at an outside institution now with esophageal stent in place  Patient underwent exploratory laparotomy and washout but has persistent esophageal/gastric leak on follow-up imaging  Patient has had multiple drains placed, most recently January 31, 2018 with bilateral chest tubes and a left upper quadrant abscess  CT scan from February 10, 2018 demonstrates a persistent fluid collection in the left retroperitoneum, sampling and aspiration with possible drainage has been requested  Exam:  General:  Awake, alert, oriented x3, no acute distress, mildly anxious  Neck:  Soft, supple, nontender  Chest:  Nontender, no deformity  Bilateral chest tubes in place  Abdomen:  Soft, mildly tender, no guarding or rebound, nondistended  Cardiac:  S1-S2, regular rate and rhythm  Lungs:  Bilateral air entry, nonlabored breathing, speaking in full sentences      Labs:  Hematology:  WBC 22 66, hemoglobin 9 6, hematocrit 30 8, platelets 334  Chemistry:  Sodium 142, potassium 3 2, chloride 102, carbon dioxide 33, BUN 19, creatinine 0 37, glucose 160  Coagulation:  02/04/2018 INR 1 26, prothrombin time 15 9, PTT 37    Imaging:  CT February 10, 2018 demonstrates large collection of air adjacent to the esophagus, likely site of leak with a small amount of fluid  In addition, there is a persistent left paracolic/retroperitoneal peripherally enhancing fluid collection concerning for abscess inferior to the left kidney and adjacent to the bowel  Plan:  Proceed with image guided aspiration, possible drain placement, possible intervention    The collection to be drained was confirmed with Dr Reynold Palomares [facility]  Specifically, the large collection of air is not the target of today's drainage per the surgical service  I discussed the procedure, its details, risks, benefits and alternatives with the patient  All questions were answered  Patient elects to proceed with the procedure  H&P was reviewed

## 2018-02-12 NOTE — PROGRESS NOTES
Progress Note - Infectious Disease   Mely Lindsey 36 y o  female MRN: [id number]  Unit/Bed#: [facility] 909-01 Encounter: [id number]      Impression/Recommendations:  1   Intra-abdominal/pelvic abscesses   Patient is status post multiple abdominal washouts   She has multiple drains in place   She is status post placement of drainage in a new perisplenic collection   She is clinically well and systemically stable  Operative culture from [facility], growing only ampicillin susceptible Enterococcus   Operative culture here also growing  only ampicillin susceptible Enterococcus   Latest culture also grew Saccharomyces, most likely colonization   Repeat CT from from over weekend showed enlarging left-sided collection around esophageal leukocyte  WBC stable in the 20s  Continue with IV Unasyn/fluconazole  Continue drainage  May need to further drain enlarging left-sided collection      2   Possible infected  shunt   Given presence of tip of  shunt in peritoneal space, there is high probability of  shunt infection   Fortunately, patient has no symptoms concerning for meningitis   She has neurological deficits   She is status post tapping of  shunt at [facility]   CSF culture there had no growth but patient had prior antibiotic  Angelita Plum will go ahead and treat her for possible/presumptive  shunt infection   Patient is now status post  shunt resection   CSF culture here also negative   With removal shunt, antibiotic course can be decreased     Antibiotic plan as in above  Treat x 2 post VPS resection, at least another 7 days      3   Gastric perforation, status post repair, with persistent leak   She is now status post placement of esophageal stent   Unfortunately, repeat abdomen/pelvis CT showed recurrent leak with new perisplenic collection  Tatianna Godfrey is now status post placement of duodenal feeding tube and drainage of the perisplenic collection  Most recent repeat CT showed no further leak    Patient is now tolerating enteral feeding via feeding tube in duodenum  Monitor for recurrent leak  Patient will most likely need a repeat barium swallow down the line      4   Bilateral pleural effusion, status post chest tube placement   This is most likely sympathetic effusion, secondary to intra-abdominal abscesses   Chest tubes have been removed      5  Recurrent leukocytosis   WBC has improved but still remains in the 20s, with low-grade fever  Enlarging left-sided collection around esophageal leak at site may be responsible for this  Patient may need further drainage of this collection  Antibiotic plan as in above  Monitor WBC and temperature      Discussed with the patient in detail regarding above plan      Antibiotics:  Unasyn  Fluconazole # 14  POD # 18  Post VPS extraction # 7     Subjective:  Patient is comfortable  Abdominal pain is controlled and continues to improve     No headache  No chest wall pain  She is awake and alert  Temperature stays down   No chills  She is tolerating antibiotic well   No nausea, vomiting or diarrhea  Objective:  Vitals:  HR:  [] 88  Resp:  [16-18] 18  BP: (158-167)/(82-90) 160/84  SpO2:  [90 %-97 %] 97 %  Temp (24hrs), Av 6 °F (37 °C), Min:98 3 °F (36 8 °C), Max:98 9 °F (37 2 °C)  Current: Temperature: 98 5 °F (36 9 °C)    Physical Exam:     General: Awake, alert, cooperative, no distress  Head:  Incision healing  No drainage  No erythema/warmth  No tenderness  Chest:  Incision/wound healing  No erythema/warmth  No fluctuance  No drainage  No tenderness  Lungs: Expansion symmetric, no rales, no wheezing, respirations unlabored  Heart[de-identified]  Regular rate and rhythm, S1 and S2 normal, no murmur  Abdomen: Soft, mildly distended  Incision healing  Mild incisional tenderness  Drains remain seropurulent  Extremities: No edema  No erythema/warmth  No ulcer  Nontender to palpation  Skin:  No rash       Invasive Devices     Peripherally Inserted Central Catheter Line            PICC Line 55/97/83 Right Basilic 22 days          Drain            Closed/Suction Drain Left LLQ Bulb 19 Fr  17 days    Closed/Suction Drain Left LUQ Bulb 19 Fr  17 days    Closed/Suction Drain Right RLQ Bulb 19 Fr  17 days    Closed/Suction Drain Right RUQ Accordion 19 Fr  17 days    Closed/Suction Drain Left Back Bulb 12 Fr  12 days    NG/OG/Enteral Tube Enteral Feeding Tube Right nares 12 days                Labs studies:   I have personally reviewed pertinent labs  Results from last 7 days  Lab Units 02/12/18  0443 02/11/18  0450 02/09/18  0453   SODIUM mmol/L 142 140 141   POTASSIUM mmol/L 3 2* 3 5 3 8   CHLORIDE mmol/L 102 100 102   CO2 mmol/L 33* 32 32   ANION GAP mmol/L 7 8 7   BUN mg/dL 19 18 19   CREATININE mg/dL 0 37* 0 31* 0 31*   EGFR ml/min/1 73sq m 131 139 139   GLUCOSE RANDOM mg/dL 160* 140 153*   CALCIUM mg/dL 9 3 8 9 8 6       Results from last 7 days  Lab Units 02/12/18  0443 02/11/18  0450 02/10/18  1103   WBC Thousand/uL 22 66* 26 30* 23 54*   HEMOGLOBIN g/dL 9 6* 10 0* 9 7*   PLATELETS Thousands/uL 517* 531* 579*           Imaging Studies:   I have personally reviewed pertinent imaging study reports and images in PACS  Chest/abdomen/pelvis CT from 02/10 reviewed personally  Left-sided collection around esophageal leak is site has increased in size  Stable perihepatic and perisplenic collections  Left pleural effusion increased in size  Stable right pleural effusion  There is compressive atelectasis without infiltrates  EKG, Pathology, and Other Studies:   I have personally reviewed pertinent reports

## 2018-02-13 LAB
ANION GAP SERPL CALCULATED.3IONS-SCNC: 5 MMOL/L (ref 4–13)
BASOPHILS # BLD AUTO: 0.04 THOUSANDS/ΜL (ref 0–0.1)
BASOPHILS NFR BLD AUTO: 0 % (ref 0–1)
BUN SERPL-MCNC: 20 MG/DL (ref 5–25)
CALCIUM SERPL-MCNC: 10 MG/DL (ref 8.3–10.1)
CHLORIDE SERPL-SCNC: 104 MMOL/L (ref 100–108)
CO2 SERPL-SCNC: 34 MMOL/L (ref 21–32)
CREAT SERPL-MCNC: 0.42 MG/DL (ref 0.6–1.3)
EOSINOPHIL # BLD AUTO: 0.22 THOUSAND/ΜL (ref 0–0.61)
EOSINOPHIL NFR BLD AUTO: 1 % (ref 0–6)
ERYTHROCYTE [DISTWIDTH] IN BLOOD BY AUTOMATED COUNT: 17.5 % (ref 11.6–15.1)
GFR SERPL CREATININE-BSD FRML MDRD: 126 ML/MIN/1.73SQ M
GLUCOSE SERPL-MCNC: 144 MG/DL (ref 65–140)
GLUCOSE SERPL-MCNC: 149 MG/DL (ref 65–140)
GLUCOSE SERPL-MCNC: 153 MG/DL (ref 65–140)
GLUCOSE SERPL-MCNC: 168 MG/DL (ref 65–140)
GLUCOSE SERPL-MCNC: 194 MG/DL (ref 65–140)
GLUCOSE SERPL-MCNC: 43 MG/DL (ref 65–140)
HCT VFR BLD AUTO: 29.9 % (ref 34.8–46.1)
HGB BLD-MCNC: 9.3 G/DL (ref 11.5–15.4)
LYMPHOCYTES # BLD AUTO: 1.75 THOUSANDS/ΜL (ref 0.6–4.47)
LYMPHOCYTES NFR BLD AUTO: 9 % (ref 14–44)
MCH RBC QN AUTO: 29.8 PG (ref 26.8–34.3)
MCHC RBC AUTO-ENTMCNC: 31.1 G/DL (ref 31.4–37.4)
MCV RBC AUTO: 96 FL (ref 82–98)
MONOCYTES # BLD AUTO: 1.14 THOUSAND/ΜL (ref 0.17–1.22)
MONOCYTES NFR BLD AUTO: 6 % (ref 4–12)
NEUTROPHILS # BLD AUTO: 15.8 THOUSANDS/ΜL (ref 1.85–7.62)
NEUTS SEG NFR BLD AUTO: 84 % (ref 43–75)
NRBC BLD AUTO-RTO: 0 /100 WBCS
PLATELET # BLD AUTO: 521 THOUSANDS/UL (ref 149–390)
PMV BLD AUTO: 9.7 FL (ref 8.9–12.7)
POTASSIUM SERPL-SCNC: 3.6 MMOL/L (ref 3.5–5.3)
RBC # BLD AUTO: 3.12 MILLION/UL (ref 3.81–5.12)
SODIUM SERPL-SCNC: 143 MMOL/L (ref 136–145)
WBC # BLD AUTO: 19.04 THOUSAND/UL (ref 4.31–10.16)

## 2018-02-13 PROCEDURE — 0WQ8XZZ REPAIR CHEST WALL, EXTERNAL APPROACH: ICD-10-PCS | Performed by: SURGERY

## 2018-02-13 PROCEDURE — 99233 SBSQ HOSP IP/OBS HIGH 50: CPT | Performed by: INTERNAL MEDICINE

## 2018-02-13 PROCEDURE — 82948 REAGENT STRIP/BLOOD GLUCOSE: CPT

## 2018-02-13 PROCEDURE — 99024 POSTOP FOLLOW-UP VISIT: CPT | Performed by: PHYSICIAN ASSISTANT

## 2018-02-13 PROCEDURE — 80048 BASIC METABOLIC PNL TOTAL CA: CPT | Performed by: STUDENT IN AN ORGANIZED HEALTH CARE EDUCATION/TRAINING PROGRAM

## 2018-02-13 PROCEDURE — 99024 POSTOP FOLLOW-UP VISIT: CPT | Performed by: SURGERY

## 2018-02-13 PROCEDURE — C9113 INJ PANTOPRAZOLE SODIUM, VIA: HCPCS | Performed by: NURSE PRACTITIONER

## 2018-02-13 PROCEDURE — 85025 COMPLETE CBC W/AUTO DIFF WBC: CPT | Performed by: STUDENT IN AN ORGANIZED HEALTH CARE EDUCATION/TRAINING PROGRAM

## 2018-02-13 RX ORDER — LIDOCAINE HYDROCHLORIDE AND EPINEPHRINE 10; 10 MG/ML; UG/ML
10 INJECTION, SOLUTION INFILTRATION; PERINEURAL ONCE
Status: COMPLETED | OUTPATIENT
Start: 2018-02-13 | End: 2018-02-13

## 2018-02-13 RX ORDER — POTASSIUM CHLORIDE 14.9 MG/ML
20 INJECTION INTRAVENOUS
Status: COMPLETED | OUTPATIENT
Start: 2018-02-13 | End: 2018-02-14

## 2018-02-13 RX ADMIN — NORTRIPTYLINE HYDROCHLORIDE 10 MG: 10 CAPSULE ORAL at 09:44

## 2018-02-13 RX ADMIN — POTASSIUM CHLORIDE 20 MEQ: 200 INJECTION, SOLUTION INTRAVENOUS at 12:51

## 2018-02-13 RX ADMIN — HEPARIN SODIUM 5000 UNITS: 5000 INJECTION, SOLUTION INTRAVENOUS; SUBCUTANEOUS at 09:44

## 2018-02-13 RX ADMIN — INSULIN LISPRO 1 UNITS: 100 INJECTION, SOLUTION INTRAVENOUS; SUBCUTANEOUS at 12:50

## 2018-02-13 RX ADMIN — Medication 3 G: at 09:44

## 2018-02-13 RX ADMIN — ONDANSETRON 4 MG: 2 INJECTION INTRAMUSCULAR; INTRAVENOUS at 02:11

## 2018-02-13 RX ADMIN — METOPROLOL TARTRATE 10 MG: 1 INJECTION, SOLUTION INTRAVENOUS at 11:27

## 2018-02-13 RX ADMIN — Medication 3 G: at 19:25

## 2018-02-13 RX ADMIN — LIDOCAINE HYDROCHLORIDE,EPINEPHRINE BITARTRATE 10 ML: 10; .01 INJECTION, SOLUTION INFILTRATION; PERINEURAL at 11:27

## 2018-02-13 RX ADMIN — INSULIN LISPRO 1 UNITS: 100 INJECTION, SOLUTION INTRAVENOUS; SUBCUTANEOUS at 06:05

## 2018-02-13 RX ADMIN — NORTRIPTYLINE HYDROCHLORIDE 10 MG: 10 CAPSULE ORAL at 17:56

## 2018-02-13 RX ADMIN — HYDROMORPHONE HYDROCHLORIDE 1 MG: 1 INJECTION, SOLUTION INTRAMUSCULAR; INTRAVENOUS; SUBCUTANEOUS at 21:35

## 2018-02-13 RX ADMIN — METOPROLOL TARTRATE 10 MG: 1 INJECTION, SOLUTION INTRAVENOUS at 17:51

## 2018-02-13 RX ADMIN — Medication 3 G: at 16:17

## 2018-02-13 RX ADMIN — HYDROMORPHONE HYDROCHLORIDE 1 MG: 1 INJECTION, SOLUTION INTRAMUSCULAR; INTRAVENOUS; SUBCUTANEOUS at 10:57

## 2018-02-13 RX ADMIN — INSULIN LISPRO 1 UNITS: 100 INJECTION, SOLUTION INTRAVENOUS; SUBCUTANEOUS at 01:00

## 2018-02-13 RX ADMIN — HEPARIN SODIUM 5000 UNITS: 5000 INJECTION, SOLUTION INTRAVENOUS; SUBCUTANEOUS at 21:21

## 2018-02-13 RX ADMIN — METOPROLOL TARTRATE 10 MG: 1 INJECTION, SOLUTION INTRAVENOUS at 21:22

## 2018-02-13 RX ADMIN — PANTOPRAZOLE SODIUM 40 MG: 40 INJECTION, POWDER, FOR SOLUTION INTRAVENOUS at 09:44

## 2018-02-13 RX ADMIN — FLUCONAZOLE 400 MG: 2 INJECTION INTRAVENOUS at 17:47

## 2018-02-13 RX ADMIN — POTASSIUM CHLORIDE 20 MEQ: 200 INJECTION, SOLUTION INTRAVENOUS at 10:48

## 2018-02-13 RX ADMIN — METOPROLOL TARTRATE 10 MG: 1 INJECTION, SOLUTION INTRAVENOUS at 05:00

## 2018-02-13 RX ADMIN — Medication 3 G: at 01:21

## 2018-02-13 NOTE — MALNUTRITION/BMI
This medical record reflects one or more clinical indicators suggestive of malnutrition and/or morbid obesity  Please indicate the one diagnosis below which you feel best reflects the clinical picture  Malnutrition Findings:   Malnutrition type: Acute illness (Pt w/ moderate malnutrition due to gastric leak/decreased intake as evidenced by slightly dark orbitals and depressed temples treated with enteral nutrition support currently at goal)  Degree of Malnutrition: Malnutrition of moderate degree      See Nutrition note dated 2/13/18 for additional details  Completed nutrition assessment is viewable in the nutrition documentation

## 2018-02-13 NOTE — PROGRESS NOTES
Progress Note - General Surgery   Michelle Melo 36 y o  female MRN: [id number]  Unit/Bed#: [facility] 909-01 Encounter: [id number]    Assessment:  36y o -year-old female with gastric perforation s/p hiatal hernia repair at outside hospital, s/p esophageal stent placement, exploratory laparotomy and washout  Has had persistent esophageal/gastric leak on imaging  Had bilateral pleural effusions which were drained  Perisplenic fluid collection was drained          Plan:  - continue TF's at goal, no oral diet  - continue to monitor for fevers  - unasyn/diflucan per ID until 2/19   - OOB/ambulate  - PT/OT  - SQH/SCDs  - dispo planning? Patient to go home with VNA/OT/PT         Subjective/Objective   Chief Complaint:     Subjective: Abdominal soreness but denies pain at rest  Denies fever/chills, tolerating tube feeds at goal with no nausea vomiting  IR drainage done yesterday with 17ml purulent green fluid    Objective:     Blood pressure 149/79, pulse 104, temperature 99 °F (37 2 °C), temperature source Oral, resp  rate 18, height 5' 4" (1 626 m), weight 67 4 kg (148 lb 9 4 oz), SpO2 96 %, not currently breastfeeding  ,Body mass index is 25 51 kg/m²  Intake/Output Summary (Last 24 hours) at 02/13/18 1749  Last data filed at 02/13/18 0047   Gross per 24 hour   Intake              140 ml   Output             2190 ml   Net            -2050 ml       Invasive Devices     Peripherally Inserted Central Catheter Line            PICC Line 75/10/33 Right Basilic 23 days          Drain            Closed/Suction Drain Left LLQ Bulb 19 Fr  18 days    Closed/Suction Drain Left LUQ Bulb 19 Fr  18 days    Closed/Suction Drain Right RLQ Bulb 19 Fr  18 days    Closed/Suction Drain Right RUQ Accordion 19 Fr  18 days    Closed/Suction Drain Left Back Bulb 12 Fr   12 days    NG/OG/Enteral Tube Enteral Feeding Tube Right nares 12 days                Physical Exam:   NAD  RRR  Norm resp effort  Abd soft, distended, NT, ADENIKE x4 with very minimal output in bulbs, thin serosang  Perisplenic drain (LUQ) serosang  TF's at 59 and tolerating      Lab, Imaging and other studies:  I have personally reviewed pertinent lab results    , CBC:   Lab Results   Component Value Date    WBC 19 04 (H) 02/13/2018    HGB 9 3 (L) 02/13/2018    HCT 29 9 (L) 02/13/2018    MCV 96 02/13/2018     (H) 02/13/2018    MCH 29 8 02/13/2018    MCHC 31 1 (L) 02/13/2018    RDW 17 5 (H) 02/13/2018    MPV 9 7 02/13/2018    NRBC 0 02/13/2018   , CMP:   No results found for: NA, K, CL, CO2, ANIONGAP, BUN, CREATININE, GLUCOSE, CALCIUM, AST, ALT, ALKPHOS, PROT, ALBUMIN, BILITOT, EGFR  VTE Pharmacologic Prophylaxis: Sequential compression device (Venodyne)   VTE Mechanical Prophylaxis: sequential compression device

## 2018-02-13 NOTE — NUTRITION
02/13/18 1545   Recommendations/Interventions   Interventions EN continue as ordered;EN flush change (specify)   Nutrition Recommendations Continue EN/PN as ordered; Other (specify)  (Consider adding 100ml H2O flush q 4 hrs to meet hydration needs)

## 2018-02-13 NOTE — OCCUPATIONAL THERAPY NOTE
Occupational Therapy  Attempt see pt x 2 this date  First attempt pt reports "later"  Second attempt pt is refusing reporting "I just got meds and my stitches re-done, I'm just too tired today"  Despite max encouragement and education pt still not agreeable at this time  Continue see pt as able, follow with current POC    David Man

## 2018-02-13 NOTE — PROCEDURES
History:   Patient was found to have persistent clear drainage from lateral pole of right-sided chest wall incision  Status post externalization of ventriculoperitoneal shunt 14 days ago secondary to abdominal abscesses to prevent risk of meningitis  Subsequently patient had ventriculoperitoneal shunt removed after review of indications for which shunt was inserted  Risk versus benefit of removing shunt was reviewed  Following shunt removal patient has had uncomplicated course, however, continues to recover from complicated abdominal infection with persistent ADENIKE drains in place  Due to presumable CSF drainage from lateral pole of chest wall incision, was recommended that the incision was oversewn to prevent persistent CSF drainage  Patient was agreeable  Today patient continues to deny headache, vision changes following removal of  shunt  Her neuro exam remains stable  Procedure: Incision site was prepped with ChloraPrep was allowed to dry  Sterile drapes were used  5cc of 1% lidocaine was injected surrounding the incision for local anesthesia  Patient was also given 1mg of dilaudid prior to incision  Pre-existing incision was oversewn with 2-0 nylon in a sterile fashion  Patient tolerated well  Following procedure no further CSF drainage was able to be expressed  4x4s and a Tegaderm dressing were applied  Patient's nurse, Marii Santoyo, was present during the entire procedure  Plan:  Continue management of gastric perforation status post hiatal hernia repair, esophageal stent placement, exploratory laparotomy and washout, multiple ADENIKE drains per general surgery team     Neurosurgery will follow up tomorrow to evaluate for persistent drainage from right chest wall incision  Nurse encouraged to call if it appears drainage is reaccumulating

## 2018-02-13 NOTE — PROGRESS NOTES
Patient care rounds were completed with the patient's nurse today,  Christina Dimas  We discussed the plan is to continue to keep her NPO for now with Kale fed tube feeds at goal; planned add free water flushes to her tube feeds  Continue all current drains and monitor drain output while awaiting culture results from drainage yesterday  Continue current antimicrobial regimen per Infectious Disease  Consider initiating oral diet pending upper GI swallow study potentially today  Per the patient and her family, they are requesting potential transfer of service to Dr Christian Rascon if he is willing; we are awaiting a response from Dr Christian Rascon before proceeding with additional workup  We reviewed all of the invasive devices/lines/telemetry orders   - Continue PICC line for IV access; anticipate discontinuing the line prior to discharge  - Continue nasogastric tube for enteral nutrition  Pain Assessment / Plan:  - Continue current analgesic regimen  Mobility Assessment / Plan:  Continue PT and OT evaluation and treatment as indicated       Goals / Barriers for discharge:  - Awaiting further workup and management of her enteric leak  - Case management following  All questions and concerns were addressed  I spent greater than 25 minutes reviewing the plan with the patient and the nurse, and coordinating her care for the day      Cindy Todd PA-C  2/13/2018 1:11 PM

## 2018-02-13 NOTE — PROGRESS NOTES
Progress Note - Infectious Disease   Jayla Ro 36 y o  female MRN: [id number]  Unit/Bed#: [facility] 909-01 Encounter: [id number]      Impression/Recommendations:  1   Intra-abdominal/pelvic abscesses   Patient is status post multiple abdominal washouts   She has multiple drains in place   She is status post placement of drainage in a new perisplenic collection   She is clinically well and systemically stable  Operative culture from [address] [place] [address], growing only ampicillin susceptible Enterococcus   Operative culture here also growing  only ampicillin susceptible Enterococcus   Latest culture also grew Saccharomyces, most likely colonization   Repeat CT from from over weekend showed enlarging left-sided collection around esophageal leukocyte   Patient is status post drainage of left retroperitoneal collection  WBC decreasing after last drainage  Continue with IV Unasyn/fluconazole  Continue drainage      2   Possible infected  shunt   Given presence of tip of  shunt in peritoneal space, there is high probability of  shunt infection   Fortunately, patient has no symptoms concerning for meningitis   She has neurological deficits   She is status post tapping of  shunt at [facility]   CSF culture there had no growth but patient had prior antibiotic  Lc Snyder will go ahead and treat her for possible/presumptive  shunt infection   Patient is now status post  shunt resection   CSF culture here also negative   With removal shunt, antibiotic course can be decreased     Antibiotic plan as in above    Treat x 2 post VPS resection, at least another 6 days      3   Gastric perforation, status post repair, with persistent leak   She is now status post placement of esophageal stent   Unfortunately, repeat abdomen/pelvis CT showed recurrent leak with new perisplenic collection  Luci Tobias is now status post placement of duodenal feeding tube and drainage of the perisplenic collection   Most recent repeat CT showed no further leak   Patient is now tolerating enteral feeding via feeding tube in duodenum  Monitor for recurrent leak  Patient will most likely need a repeat barium swallow down the line      4   Bilateral pleural effusion, status post chest tube placement   This is most likely sympathetic effusion, secondary to intra-abdominal abscesses   Chest tubes have been removed      5  Recurrent leukocytosis   WBC has improved but still remains in the 20s, with low-grade fever  Enlarging left-sided collection around esophageal leak at site may be responsible for this  WBC decreasing again after further drainage  Antibiotic plan as in above  Monitor WBC and temperature      6  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  No clinical signs of cellulitis  Patient has no headache  Neurosurgery service is aware  Plan for further suturing noted  Monitor  Discussed with the patient in detail regarding above plan  Discussed with Neurosurgery Service      Antibiotics:  Unasyn  Fluconazole # 15  POD # 19  Post VPS extraction # 8     Subjective:  Patient is status post further drainage of left retroperitoneal collection  IR note reviewed  Patient is comfortable  Abdominal pain is controlled     No headache  No chest wall pain  However, she has leaky should chest wound  She is awake and alert  Temperature stays down   No chills  She is tolerating antibiotics well   No nausea, vomiting or diarrhea  She is tolerating tube feed well  Objective:  Vitals:  HR:  [] 96  Resp:  [16-38] 16  BP: (145-171)/() 151/77  SpO2:  [96 %-99 %] 96 %  Temp (24hrs), Av 8 °F (37 1 °C), Min:98 4 °F (36 9 °C), Max:99 5 °F (37 5 °C)  Current: Temperature: 99 °F (37 2 °C)    Physical Exam:     General: Awake, alert, cooperative, no distress  Head:  Incision healed  No drainage  No erythema  No fluctuance  No tenderness  Chest:  Wound with clear drainage  No erythema/warmth  No fluctuance    No tenderness  Lungs: Expansion symmetric, no rales, no wheezing, respirations unlabored  Heart[de-identified]  Regular rate and rhythm, S1 and S2 normal, no murmur  Abdomen: Soft, incision healing well  No erythema/warmth  No purulence  Drains still seropurulent  Extremities: Trace edema  No erythema/warmth  No ulcer  Nontender to palpation  Skin:  No rash  Invasive Devices     Peripherally Inserted Central Catheter Line            PICC Line 49/40/49 Right Basilic 23 days          Drain            Closed/Suction Drain Left LLQ Bulb 19 Fr  18 days    Closed/Suction Drain Left LUQ Bulb 19 Fr  18 days    Closed/Suction Drain Right RLQ Bulb 19 Fr  18 days    Closed/Suction Drain Right RUQ Accordion 19 Fr  18 days    Closed/Suction Drain Left Back Bulb 12 Fr  13 days    NG/OG/Enteral Tube Enteral Feeding Tube Right nares 13 days                Labs studies:   I have personally reviewed pertinent labs  Results from last 7 days  Lab Units 02/13/18  0512 02/12/18  0443 02/11/18  0450   SODIUM mmol/L 143 142 140   POTASSIUM mmol/L 3 6 3 2* 3 5   CHLORIDE mmol/L 104 102 100   CO2 mmol/L 34* 33* 32   ANION GAP mmol/L 5 7 8   BUN mg/dL 20 19 18   CREATININE mg/dL 0 42* 0 37* 0 31*   EGFR ml/min/1 73sq m 126 131 139   GLUCOSE RANDOM mg/dL 43* 160* 140   CALCIUM mg/dL 10 0 9 3 8 9       Results from last 7 days  Lab Units 02/13/18  0512 02/12/18  0443 02/11/18  0450   WBC Thousand/uL 19 04* 22 66* 26 30*   HEMOGLOBIN g/dL 9 3* 9 6* 10 0*   PLATELETS Thousands/uL 521* 517* 531*           Imaging Studies:   I have personally reviewed pertinent imaging study reports and images in PACS  EKG, Pathology, and Other Studies:   I have personally reviewed pertinent reports

## 2018-02-14 ENCOUNTER — APPOINTMENT (INPATIENT)
Dept: RADIOLOGY | Facility: HOSPITAL | Age: 43
DRG: 711 | End: 2018-02-14
Payer: COMMERCIAL

## 2018-02-14 LAB
ANION GAP SERPL CALCULATED.3IONS-SCNC: 6 MMOL/L (ref 4–13)
BASOPHILS # BLD AUTO: 0.03 THOUSANDS/ΜL (ref 0–0.1)
BASOPHILS NFR BLD AUTO: 0 % (ref 0–1)
BUN SERPL-MCNC: 18 MG/DL (ref 5–25)
CALCIUM SERPL-MCNC: 9.7 MG/DL (ref 8.3–10.1)
CHLORIDE SERPL-SCNC: 105 MMOL/L (ref 100–108)
CO2 SERPL-SCNC: 34 MMOL/L (ref 21–32)
CREAT SERPL-MCNC: 0.35 MG/DL (ref 0.6–1.3)
EOSINOPHIL # BLD AUTO: 0.31 THOUSAND/ΜL (ref 0–0.61)
EOSINOPHIL NFR BLD AUTO: 2 % (ref 0–6)
ERYTHROCYTE [DISTWIDTH] IN BLOOD BY AUTOMATED COUNT: 17.4 % (ref 11.6–15.1)
GFR SERPL CREATININE-BSD FRML MDRD: 134 ML/MIN/1.73SQ M
GLUCOSE SERPL-MCNC: 105 MG/DL (ref 65–140)
GLUCOSE SERPL-MCNC: 152 MG/DL (ref 65–140)
GLUCOSE SERPL-MCNC: 164 MG/DL (ref 65–140)
GLUCOSE SERPL-MCNC: 173 MG/DL (ref 65–140)
HCT VFR BLD AUTO: 30.9 % (ref 34.8–46.1)
HGB BLD-MCNC: 9.6 G/DL (ref 11.5–15.4)
LYMPHOCYTES # BLD AUTO: 1.27 THOUSANDS/ΜL (ref 0.6–4.47)
LYMPHOCYTES NFR BLD AUTO: 7 % (ref 14–44)
MAGNESIUM SERPL-MCNC: 2.1 MG/DL (ref 1.6–2.6)
MCH RBC QN AUTO: 29.7 PG (ref 26.8–34.3)
MCHC RBC AUTO-ENTMCNC: 31.1 G/DL (ref 31.4–37.4)
MCV RBC AUTO: 96 FL (ref 82–98)
MONOCYTES # BLD AUTO: 1.14 THOUSAND/ΜL (ref 0.17–1.22)
MONOCYTES NFR BLD AUTO: 6 % (ref 4–12)
NEUTROPHILS # BLD AUTO: 14.93 THOUSANDS/ΜL (ref 1.85–7.62)
NEUTS SEG NFR BLD AUTO: 85 % (ref 43–75)
NRBC BLD AUTO-RTO: 0 /100 WBCS
PLATELET # BLD AUTO: 514 THOUSANDS/UL (ref 149–390)
PMV BLD AUTO: 9.7 FL (ref 8.9–12.7)
POTASSIUM SERPL-SCNC: 3.7 MMOL/L (ref 3.5–5.3)
RBC # BLD AUTO: 3.23 MILLION/UL (ref 3.81–5.12)
SODIUM SERPL-SCNC: 145 MMOL/L (ref 136–145)
WBC # BLD AUTO: 17.73 THOUSAND/UL (ref 4.31–10.16)

## 2018-02-14 PROCEDURE — 99233 SBSQ HOSP IP/OBS HIGH 50: CPT | Performed by: INTERNAL MEDICINE

## 2018-02-14 PROCEDURE — 74018 RADEX ABDOMEN 1 VIEW: CPT

## 2018-02-14 PROCEDURE — C9113 INJ PANTOPRAZOLE SODIUM, VIA: HCPCS | Performed by: NURSE PRACTITIONER

## 2018-02-14 PROCEDURE — 74220 X-RAY XM ESOPHAGUS 1CNTRST: CPT

## 2018-02-14 PROCEDURE — 82948 REAGENT STRIP/BLOOD GLUCOSE: CPT

## 2018-02-14 PROCEDURE — 97535 SELF CARE MNGMENT TRAINING: CPT

## 2018-02-14 PROCEDURE — 94762 N-INVAS EAR/PLS OXIMTRY CONT: CPT

## 2018-02-14 PROCEDURE — 80048 BASIC METABOLIC PNL TOTAL CA: CPT | Performed by: SURGERY

## 2018-02-14 PROCEDURE — 97530 THERAPEUTIC ACTIVITIES: CPT

## 2018-02-14 PROCEDURE — 85025 COMPLETE CBC W/AUTO DIFF WBC: CPT | Performed by: SURGERY

## 2018-02-14 PROCEDURE — 83735 ASSAY OF MAGNESIUM: CPT | Performed by: SURGERY

## 2018-02-14 RX ADMIN — NORTRIPTYLINE HYDROCHLORIDE 10 MG: 10 CAPSULE ORAL at 18:50

## 2018-02-14 RX ADMIN — HYDROMORPHONE HYDROCHLORIDE 0.5 MG: 1 INJECTION, SOLUTION INTRAMUSCULAR; INTRAVENOUS; SUBCUTANEOUS at 14:54

## 2018-02-14 RX ADMIN — HYDROMORPHONE HYDROCHLORIDE 1 MG: 1 INJECTION, SOLUTION INTRAMUSCULAR; INTRAVENOUS; SUBCUTANEOUS at 21:20

## 2018-02-14 RX ADMIN — HEPARIN SODIUM 5000 UNITS: 5000 INJECTION, SOLUTION INTRAVENOUS; SUBCUTANEOUS at 10:06

## 2018-02-14 RX ADMIN — FLUCONAZOLE 400 MG: 2 INJECTION INTRAVENOUS at 18:44

## 2018-02-14 RX ADMIN — METOPROLOL TARTRATE 10 MG: 1 INJECTION, SOLUTION INTRAVENOUS at 12:30

## 2018-02-14 RX ADMIN — INSULIN LISPRO 1 UNITS: 100 INJECTION, SOLUTION INTRAVENOUS; SUBCUTANEOUS at 12:35

## 2018-02-14 RX ADMIN — METOPROLOL TARTRATE 10 MG: 1 INJECTION, SOLUTION INTRAVENOUS at 18:50

## 2018-02-14 RX ADMIN — Medication 3 G: at 01:33

## 2018-02-14 RX ADMIN — Medication 3 G: at 09:50

## 2018-02-14 RX ADMIN — BISACODYL 10 MG: 10 SUPPOSITORY RECTAL at 06:08

## 2018-02-14 RX ADMIN — ONDANSETRON 4 MG: 2 INJECTION INTRAMUSCULAR; INTRAVENOUS at 10:09

## 2018-02-14 RX ADMIN — PANTOPRAZOLE SODIUM 40 MG: 40 INJECTION, POWDER, FOR SOLUTION INTRAVENOUS at 10:03

## 2018-02-14 RX ADMIN — INSULIN LISPRO 1 UNITS: 100 INJECTION, SOLUTION INTRAVENOUS; SUBCUTANEOUS at 06:07

## 2018-02-14 RX ADMIN — METOPROLOL TARTRATE 10 MG: 1 INJECTION, SOLUTION INTRAVENOUS at 03:17

## 2018-02-14 RX ADMIN — HYDROMORPHONE HYDROCHLORIDE 1 MG: 1 INJECTION, SOLUTION INTRAMUSCULAR; INTRAVENOUS; SUBCUTANEOUS at 17:46

## 2018-02-14 RX ADMIN — IOHEXOL 25 ML: 350 INJECTION, SOLUTION INTRAVENOUS at 11:30

## 2018-02-14 RX ADMIN — Medication 3 G: at 20:48

## 2018-02-14 RX ADMIN — Medication 3 G: at 14:49

## 2018-02-14 RX ADMIN — HEPARIN SODIUM 5000 UNITS: 5000 INJECTION, SOLUTION INTRAVENOUS; SUBCUTANEOUS at 22:31

## 2018-02-14 NOTE — PROGRESS NOTES
Progress Note - Infectious Disease   Mely Lindsey 36 y o  female MRN: [id number]  Unit/Bed#: [facility] 909-01 Encounter: [id number]      Impression/Recommendations:  1   Intra-abdominal/pelvic abscesses   Patient is status post multiple abdominal washouts   She has multiple drains in place   She is status post placement of drainage in a new perisplenic collection   She is clinically well and systemically stable  Operative culture from [facility], growing only ampicillin susceptible Enterococcus   Operative culture here also growing  only ampicillin susceptible Enterococcus   Latest culture also grew Saccharomyces, most likely colonization   Repeat CT from from over weekend showed enlarging left-sided collection around esophageal leukocyte   Patient is status post drainage of left retroperitoneal collection on 02/12  WBC decreasing since this last drainage  Culture is growing Enterococcus again  Continue with IV Unasyn/fluconazole  Continue drainage      2   Possible infected  shunt   Given presence of tip of  shunt in peritoneal space, there is high probability of  shunt infection   Fortunately, patient has no symptoms concerning for meningitis   She has neurological deficits   She is status post tapping of  shunt at [facility]   CSF culture there had no growth but patient had prior antibiotic  Angelita Plum will go ahead and treat her for possible/presumptive  shunt infection   Patient is now status post  shunt resection   CSF culture here also negative   With removal shunt, antibiotic course can be decreased     Antibiotic plan as in above    Treat x 2 post VPS resection, another 5 days      3   Gastric perforation, status post repair, with persistent leak   She is now status post placement of esophageal stent   Unfortunately, repeat abdomen/pelvis CT showed recurrent leak with new perisplenic collection   She is now status post placement of duodenal feeding tube and drainage of the perisplenic collection   Most recent repeat CT showed no further leak   Patient is now tolerating enteral feeding via feeding tube in duodenum  Monitor for recurrent leak  Plan for repeat barium swallow later today noted      4   Bilateral pleural effusion, status post chest tube placement   This is most likely sympathetic effusion, secondary to intra-abdominal abscesses   Chest tubes have been removed  Patient remains comfortable      5  Recurrent leukocytosis   WBC has improved but still remains in the 20s, with low-grade fever   Enlarging left-sided collection around esophageal leak at site may be responsible for this    WBC decreasing again after further drainage  Antibiotic plan as in above  Monitor WBC and temperature      6  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  No clinical signs of cellulitis  Patient has no headache    wound is for this sutured  Drainage/leakage appears to be resolving  Monitor      Discussed with the patient and her fiance in detail regarding above plan      Antibiotics:  Unasyn  Fluconazole # 16  POD # 20  Post VPS extraction # 9     Subjective:  Patient is comfortable  Abdominal pain is improved and controlled     No headache   No chest wall pain  chest wall with no further leakage  She is awake and alert  Temperature stays down   No chills  She is tolerating antibiotics well   No nausea, vomiting or diarrhea  She is tolerating tube feed well  Objective:  Vitals:  HR:  [] 102  Resp:  [16-18] 18  BP: (133-159)/(80-97) 156/84  SpO2:  [97 %-99 %] 97 %  Temp (24hrs), Av 7 °F (37 1 °C), Min:97 9 °F (36 6 °C), Max:99 2 °F (37 3 °C)  Current: Temperature:  (pt down in testing with nurse unable to do tele vitals)    Physical Exam:     General: Awake, alert, cooperative, no distress  Head:  Incision healing  No erythema/warmth  No drainage  No tenderness  Chest:  Wound with dressing in place  Dressing is dry  No tenderness  No erythema    No fluctuance  Lungs: Expansion symmetric, no rales, no wheezing, respirations unlabored  Heart[de-identified]  Regular rate and rhythm, S1 and S2 normal, no murmur  Abdomen: Soft, nondistended  Incision healing  No erythema  Mild tenderness  Drains decreased in amount but remains seropurulent  Extremities: No edema  No erythema/warmth  No ulcer  Nontender to palpation  Skin:  No rash  Invasive Devices     Peripherally Inserted Central Catheter Line            PICC Line 84/71/42 Right Basilic 24 days          Drain            Closed/Suction Drain Left LLQ Bulb 19 Fr  19 days    Closed/Suction Drain Left LUQ Bulb 19 Fr  19 days    Closed/Suction Drain Right RLQ Bulb 19 Fr  19 days    Closed/Suction Drain Right RUQ Accordion 19 Fr  19 days    Closed/Suction Drain Left Back Bulb 12 Fr  14 days    NG/OG/Enteral Tube Enteral Feeding Tube Right nares 14 days                Labs studies:   I have personally reviewed pertinent labs  Results from last 7 days  Lab Units 02/14/18  0446 02/13/18  0512 02/12/18  0443   SODIUM mmol/L 145 143 142   POTASSIUM mmol/L 3 7 3 6 3 2*   CHLORIDE mmol/L 105 104 102   CO2 mmol/L 34* 34* 33*   ANION GAP mmol/L 6 5 7   BUN mg/dL 18 20 19   CREATININE mg/dL 0 35* 0 42* 0 37*   EGFR ml/min/1 73sq m 134 126 131   GLUCOSE RANDOM mg/dL 152* 43* 160*   CALCIUM mg/dL 9 7 10 0 9 3       Results from last 7 days  Lab Units 02/14/18  0446 02/13/18  0512 02/12/18  0443   WBC Thousand/uL 17 73* 19 04* 22 66*   HEMOGLOBIN g/dL 9 6* 9 3* 9 6*   PLATELETS Thousands/uL 514* 521* 517*       Results from last 7 days  Lab Units 02/12/18  1635   GRAM STAIN RESULT  2+ Polys  No bacteria seen   BODY FLUID CULTURE, STERILE  2+ Growth of Enterococcus species*       Imaging Studies:   I have personally reviewed pertinent imaging study reports and images in PACS  EKG, Pathology, and Other Studies:   I have personally reviewed pertinent reports

## 2018-02-14 NOTE — RESTORATIVE TECHNICIAN NOTE
Restorative Specialist Mobility Note       Activity: Dangle, Stand at bedside, Ambulate in room (Assisted OT in dangling pt, standing at bedside, and taking steps towards head of bed  Pt refused OOB to chair  Please refer to OT notes for all information )     Assistive Device: Other (Comment) (HHAx2)     Ambulation Response: Tolerated fairly well     Range of Motion: Active, All extremities   Pt left resting comfortably in bed, with call bell within reach and visitors present in room

## 2018-02-14 NOTE — PROGRESS NOTES
Progress Note - General Surgery   Joshua Serrato 36 y o  female MRN: [id number]  Unit/Bed#: [facility] 909-01 Encounter: [id number]    Assessment:  36y o -year-old female with gastric perforation s/p hiatal hernia repair at outside hospital, s/p esophageal stent placement, exploratory laparotomy and washout  Has had persistent esophageal/gastric leak on imaging  Had bilateral pleural effusions which were drained  Perisplenic fluid collection was drained          Plan:  - Cot tube feeds at goal; tolerating  - Monitor SQE, no apparent skin changes to indicate necrotizing infection or fistulization  - unasyn/diflucan per ID until 2/19    - OOB/ambulate  - PT/OT  - SQH/SCDs  - Bowel regimen   - dispo planning? Patient to go home with VNA/OT/PT         Subjective/Objective   Chief Complaint:     Subjective: Tolerating tube feeds, passing flatus, no BM    Objective:     Blood pressure 157/87, pulse 97, temperature 98 7 °F (37 1 °C), temperature source Oral, resp  rate 17, height 5' 4" (1 626 m), weight 67 4 kg (148 lb 9 4 oz), SpO2 97 %, not currently breastfeeding  ,Body mass index is 25 51 kg/m²  Intake/Output Summary (Last 24 hours) at 02/14/18 0623  Last data filed at 02/14/18 0600   Gross per 24 hour   Intake             1200 ml   Output             2770 ml   Net            -1570 ml       Invasive Devices     Peripherally Inserted Central Catheter Line            PICC Line 41/06/77 Right Basilic 24 days          Drain            Closed/Suction Drain Left LLQ Bulb 19 Fr  19 days    Closed/Suction Drain Left LUQ Bulb 19 Fr  19 days    Closed/Suction Drain Right RLQ Bulb 19 Fr  19 days    Closed/Suction Drain Right RUQ Accordion 19 Fr  19 days    Closed/Suction Drain Left Back Bulb 12 Fr   13 days    NG/OG/Enteral Tube Enteral Feeding Tube Right nares 13 days                Physical Exam:   NAD  RRR  Norm resp effort  Abd soft, distended, NT, ADENIKE x4 with very minimal output in bulbs, thin serosang  Perisplenic drain (LUQ) serosang  TF's at 61 and tolerating      Lab, Imaging and other studies:  I have personally reviewed pertinent lab results    , CBC:   Lab Results   Component Value Date    WBC 17 73 (H) 02/14/2018    HGB 9 6 (L) 02/14/2018    HCT 30 9 (L) 02/14/2018    MCV 96 02/14/2018     (H) 02/14/2018    MCH 29 7 02/14/2018    MCHC 31 1 (L) 02/14/2018    RDW 17 4 (H) 02/14/2018    MPV 9 7 02/14/2018    NRBC 0 02/14/2018   , CMP:   No results found for: NA, K, CL, CO2, ANIONGAP, BUN, CREATININE, GLUCOSE, CALCIUM, AST, ALT, ALKPHOS, PROT, ALBUMIN, BILITOT, EGFR  VTE Pharmacologic Prophylaxis: Sequential compression device (Venodyne)   VTE Mechanical Prophylaxis: sequential compression device

## 2018-02-14 NOTE — PLAN OF CARE
Problem: OCCUPATIONAL THERAPY ADULT  Goal: Performs self-care activities at highest level of function for planned discharge setting  See evaluation for individualized goals  Treatment Interventions: ADL retraining, Functional transfer training, Endurance training, Patient/family training, Equipment evaluation/education, Compensatory technique education, Activityengagement          See flowsheet documentation for full assessment, interventions and recommendations  Outcome: Progressing  Limitation: Decreased ADL status, Decreased self-care trans, Decreased high-level ADLs, Decreased endurance  Prognosis: Good  Assessment: Pt was seen this date for OT tx session focusing on LB dressing task, be dmobiltiy and overall activity tolerance  In supine pt is able to don socks with SBA  Requires mod A and max encouragment to sit EOB with HOB elevated  Once seated upright pt reports feeling like her NG tube is moviing, NSG immediatly notified and sx was paged  NSG taped and stabilized NG tube, pt becomes increasingly upset and requests return to supine  Mod A x 2 for STS and takes 3 side steps toward HOB  Requires mod A to reutnr to supine for LE managment  Min A for Rolling L and R to maximize comfort and positioning  Pt would benefit from continued OT tx to improve overall functional abilities  Continue follow pt with curernt POC    Recommendation:  (PT MAY BENEFIT FROM NEUROPSYCH CONSULT )  OT Discharge Recommendation: Short Term Rehab  OT - OK to Discharge: Yes (INPT REHAB when medically cleared)  CHELSY Moreno

## 2018-02-14 NOTE — OCCUPATIONAL THERAPY NOTE
Occupational Therapy Treatment Note:       02/14/18 1335   Restrictions/Precautions   Weight Bearing Precautions Per Order No   Other Precautions Fall Risk;Multiple lines;Pain  (self limiting behavior)   Pain Assessment   Pain Score 5   Pain Type Acute pain   Pain Location Abdomen   Pain Orientation Bilateral   ADL   Where Assessed Supine, bed   LB Dressing Assistance 5  Supervision/Setup   LB Dressing Deficit Don/doff R sock; Don/doff L sock   LB Dressing Comments Requires support in back on bed, supine to complete task   Functional Standing Tolerance   Time >1 min   Activity standing EOB   Bed Mobility   Rolling R 4  Minimal assistance   Additional items Assist x 1;Bedrails   Rolling L 4  Minimal assistance   Additional items Assist x 1;Bedrails   Supine to Sit 3  Moderate assistance   Additional items Assist x 1; Increased time required   Sit to Supine 3  Moderate assistance   Additional items Assist x 1   Additional Comments Increased time requires and increased emotional support provided   Transfers   Sit to Stand 3  Moderate assistance   Additional items Assist x 2; Increased time required   Stand to Sit 3  Moderate assistance   Additional items Assist x 2   Additional Comments Pt able to take 3 side steps toward HOB   Cognition   Overall Cognitive Status WFL   Arousal/Participation Arousable   Attention Attends with cues to redirect   Orientation Level Oriented X4   Memory Decreased recall of precautions   Following Commands Follows one step commands with increased time or repetition   Comments Pt has poor insight into dehabilitating behaviors, despite max encourmet pt is very self-limiting and minimally agreeableable to and OOB activity  REquires max Encouragment, education and emotional support throughout     Activity Tolerance   Activity Tolerance Other (Comment)  (self limiting)   Medical Staff Made Aware NSG aware   Assessment   Assessment Pt was seen this date for OT tx session focusing on LB dressing task, be dmobiltiy and overall activity tolerance  In supine pt is able to don socks with SBA  Requires mod A and max encouragment to sit EOB with HOB elevated  Once seated upright pt reports feeling like her NG tube is moviing, NSG immediatly notified and sx was paged  NSG taped and stabilized NG tube, pt becomes increasingly upset and requests return to supine  Mod A x 2 for STS and takes 3 side steps toward HOB  Requires mod A to reutnr to supine for LE managment  Min A for Rolling L and R to maximize comfort and positioning  Pt would benefit from continued OT tx to improve overall functional abilities  Continue follow pt with curernt POC     Plan   Treatment Interventions ADL retraining   Goal Expiration Date 03/04/18   Treatment Day 3   OT Frequency 3-5x/wk   Recommendation   OT Discharge Recommendation Short Term [address] [facility] [place], [place]

## 2018-02-14 NOTE — PHYSICAL THERAPY NOTE
Physical Therapy Cancellation Note  Attempted therapy with patient today, with patient denying secondary to patient stating "too much pain " Asked patient second time with patient reporting she will try tomorrow  Will follow up with patient tomorrow       Tulio [place], [place]

## 2018-02-14 NOTE — POST OP PROGRESS NOTES
Progress Note - Neurosurgery   Mars Mortimer 36 y o  female MRN: [id number]  Unit/Bed#: [facility] 909-01 Encounter: [id number]    Assessment:  1  POD9 removal of VPS  2  Incisional drainage - resolved  3  History of ventriculoperitoneal shunt for idiopathic intracranial hypertension (originally placed May 30, 2017)  4  Left upper extremity occlusive thrombophlebitis cephalic vein  5  Peritonitis   6  Gastric perforation status post repair    Plan:  14-year-old female transfer [facility] for esophageal stent status post upper gastric injury during laparoscopic paraesophageal hernia repair  Patient developed sepsis and required multiple operative procedures  Upon transfer to [facility], she underwent a esophageal stent and externalization ventriculoperitoneal shunt which has since been completed removed  · Exam reveals with diffuse weakness throughout  Voice soft but appropriate  More interactive today  Following commands  No focal findings  · Scalp Incision CDI without erythema edema or drainage  · Right chest incision clean dry intact following sutures placed yesterday  Dressing dry  No erythema edema or drainage  No subcutaneous collections noted  Incision is flat  · New dry sterile bandage placed   · Pain control per primary team   · Ongoing medical management per primary team   Drain management  Turning white count 10 temperature profile  · Infectious disease following - continue on IV Unasyn and fluconazole for five additional days  · Mobilized out of bed  Encouraged mobilization out of bed  PT/OT - recommending round  · DVT PPX:  Heparin and SCDs on during exam   · No further neurosurgical intervention anticipated at this juncture  · Repeat CT head for baseline following shunt removal   · Patient right require lumbar puncture for opening and closing pressure if she develops any collections or further incisional drainage    · Recommend neurology evaluation for consideration of medical treatment for pseudotumor cerebri since shunt has been removed  · Call if any questions or concerns including headaches, vision changes or incisional drainage  Subjective/Objective   Chief Complaint: "There letting me have siri-aid"    Subjective:  Patient denies significant headache  Notes mild right occipital pressure and incisional pain when lying on right side  Denies visual disturbances  Admits to ongoing diffuse weakness stating her legs feel like Jello  Denies chest pain or shortness of breath  Voiding well  Objective:  Sitting up in bed  NAD  I/O       02/12 0701 - 02/13 0700 02/13 0701 - 02/14 0700 02/14 0701 - 02/15 0700    P  O  0 0 0    NG/GT  492     IV Piggyback 140  100    Feedings  708     Total Intake(mL/kg) 140 (2 1) 1200 (17 8) 100 (1 5)    Urine (mL/kg/hr) 2050 (1 3) 2875 (1 8) 475 (1 6)    Drains 49 (0) 45 (0)     Total Output 2099 2920 475    Net -1959 -1720 -375           Unmeasured Urine Occurrence 1 x  1 x          Invasive Devices     Peripherally Inserted Central Catheter Line            PICC Line 86/17/72 Right Basilic 24 days          Drain            Closed/Suction Drain Left LLQ Bulb 19 Fr  19 days    Closed/Suction Drain Left LUQ Bulb 19 Fr  19 days    Closed/Suction Drain Right RLQ Bulb 19 Fr  19 days    Closed/Suction Drain Right RUQ Accordion 19 Fr  19 days    Closed/Suction Drain Left Back Bulb 12 Fr  14 days    NG/OG/Enteral Tube Enteral Feeding Tube Right nares 14 days                Physical Exam:  Vitals: Blood pressure 156/84, pulse 102, temperature 97 9 °F (36 6 °C), temperature source Oral, resp  rate 18, height 5' 4" (1 626 m), weight 67 4 kg (148 lb 9 4 oz), SpO2 97 %, not currently breastfeeding  ,Body mass index is 25 51 kg/m²      General appearance: alert, appears stated age, cooperative and no distress  Head: Normocephalic, without obvious abnormality, cranial incision clean dry intact without erythema edema or drainage  Eyes: EOMI, PERRL  Neck: supple, symmetrical, trachea midline   Lungs: non labored breathing  Heart:  Mild tachycardia  Neurologic:   Mental status: Alert, oriented, thought content appropriate  Cranial nerves: grossly intact (Cranial nerves II-XII)  Sensory: normal to LT  Motor: moving all extremities with mild diffuse weakness lower extremities greater than upper extremities  Reflexes: 2+ and symmetric  Coordination: finger to nose normal bilaterally, no drift bilaterally    Lab Results:    Results from last 7 days  Lab Units 02/14/18  0446 02/13/18  0512 02/12/18  0443   WBC Thousand/uL 17 73* 19 04* 22 66*   HEMOGLOBIN g/dL 9 6* 9 3* 9 6*   HEMATOCRIT % 30 9* 29 9* 30 8*   PLATELETS Thousands/uL 514* 521* 517*   NEUTROS PCT % 85* 84* 86*   MONOS PCT % 6 6 6       Results from last 7 days  Lab Units 02/14/18  0446 02/13/18  0512 02/12/18  0443   SODIUM mmol/L 145 143 142   POTASSIUM mmol/L 3 7 3 6 3 2*   CHLORIDE mmol/L 105 104 102   CO2 mmol/L 34* 34* 33*   BUN mg/dL 18 20 19   CREATININE mg/dL 0 35* 0 42* 0 37*   CALCIUM mg/dL 9 7 10 0 9 3   GLUCOSE RANDOM mg/dL 152* 43* 160*       Results from last 7 days  Lab Units 02/14/18  0446 02/12/18  0443 02/09/18  0453   MAGNESIUM mg/dL 2 1 2 2 2 2       Results from last 7 days  Lab Units 02/09/18  0453 02/08/18  0452   PHOSPHORUS mg/dL 3 7 3 4         No results found for: TROPONINT  ABG:  Lab Results   Component Value Date    PHART 7 484 (H) 01/25/2018    TZA3YFJ 33 6 (L) 01/25/2018    PO2ART 165 1 (H) 01/25/2018    UUQ5RPI 24 7 01/25/2018    BEART 1 6 01/25/2018    SOURCE Line, Arterial 01/25/2018       Imaging Studies: I have personally reviewed pertinent reports  and I have personally reviewed pertinent films in PACS    XR abdomen 1 view kub   Final Result by Mars Saldana DO (02/14 1641)      Feeding tube tip courses to the region of the jejunum  Stable appearance of distal esophageal stent  Developing subcutaneous emphysema right lateral abdominal wall                 Workstation performed: [id number]         FL barium swallow   Final Result by Anton García MD (02/14 1256)      Persistent leak into the left upper quadrant  I personally discussed this study with Migue Carter on 2/14/2018 at 12:21 PM                Workstation performed: [id number]         CT needle bx aspiration injection locali   Final Result by Willy Reddy MD (02/13 3002)   Impression: Successful aspiration of left posterior abdominal/retroperitoneal fluid collection utilizing 5-[place]izen [place] Yueh catheter  A total of 17 cc of purulent green fluid was aspirated and a specimen sent to the laboratory as described  The collection    was too small to tolerate a drain  Additionally, communication of intra-abdominal gas to the subcutaneous tissues is now noted  PLAN: Patient will be returned to the floor  Patient will have continued follow-up with general surgery  I personally discussed this study with Marci Armijo MD on 2/13/2018 at 1:41 PM                         Workstation performed: [id number]         XR abdomen 1 view kub   Final Result by Felix Horner MD (02/11 9686)   There is a nonobstructive bowel gas pattern  Residual contrast in bowel loops  Workstation performed: [id number]         CT chest abdomen pelvis w contrast   Final Result by Auburn Alpers, MD (02/10 1700)      Prominent collection of air with a small amount of layering fluid which appears to originate from a defect adjacent to mid to distal esophageal stent on the left in the region of the expected gastroesophageal junction  This collection has increased in    size and appears slightly more loculated when compared to previous examination measuring up to 14 4 cm in greatest dimension  This appears to be the site of extravasation of enteric contrast from prior examination  Small perihepatic and perisplenic collections are similar from prior examination    As before, there is trace enteric contrast within the perisplenic fluid collection  Wall enhancing collections in the left paracolic gutter and left hemipelvis are    unchanged  As before there is extensive mesenteric edema, ascites, and anasarca  Left pleural effusion is slightly increased when compared to prior examination  Right pleural effusion is approximately stable  Compressive atelectasis is again noted  I personally discussed this study with ESPINOZA KOCH on 2/10/2018 at 2:51 PM                 Workstation performed: [id number]         XR chest portable   Final Result by Jesús Hickey MD (02/07 7813)         1  Decrease in size of right apical pneumothorax with tiny residual pneumothorax remaining  2   Small lung volumes  Workstation performed: [id number]         XR chest portable   Final Result by Sergio Mejia DO (02/06 1424)      Stable small right apical pneumothorax  Interval development of small basilar effusions and opacities possibly representing mild pulmonary edema  Workstation performed: [id number]         XR chest portable   Final Result by Renee Cuevas MD (02/06 1254)      Small right pneumothorax  Right-sided pigtail catheter removed in the interim  Remaining lines and tubes otherwise stable in position  Workstation performed: [id number]         CT chest abdomen pelvis w contrast   Final Result by Malick Dimas MD (02/04 1350)         1  Diminished size of bilateral pleural effusion status post chest tube placement  The right-sided chest tube is intrafissural terminating anterior to the middle lobe  2   Persistent bibasilar compressive atelectasis  3   The previously described extraluminal collection arising from the distal esophageal stent injecting into the gastrohepatic space is now predominantly air-filled with no progression of extravasation of enteric contrast into the collection     4   Markedly improved perisplenic collection status post percutaneous drainage catheter placement with trace enteric contrast in several foci of air noted in the collection  The right subdiaphragmatic/perihepatic collection continues to diminish in size    as well  5   Diminished size of enhancing collections in the left paracolic gutter and deep in the left pelvis with stable ascites in the abdomen and pelvis  Severe anasarca unchanged  6   Stable dilatation and wall thickening of the small bowel with enteric contrast seen to the splenic flexure  No gross obstruction or pneumoperitoneum  Workstation performed: [id number]         XR abdomen 1 view kub   Final Result by Urbano Newberry MD (02/03 1038)      Feeding tube terminates within the distal duodenumproximal jejunum  Workstation performed: [id number]         IR other   Final Result by Suhail Givens MD (02/01 1337)   Impression: Extremely challenging Keofed placement secondary to existing stent and underlying duodenal anatomy  Successful placement required removal of the weight from the tip of the catheter  Workstation performed: [id number]         IR chest tube   Final Result by Suhail Givens MD (02/01 1051)   Impression:   1  Successful placement of a 10 [place] all-purpose drainage catheter into the right pleural space under ultrasound guidance, yielding clear yellow pleural fluid  2  Successful placement of 10 [place] all-purpose drainage catheter into the left pleural space under ultrasound guidance, yielding clear yellow pleural fluid  3   Successful placement of   a 12-[place] all-purpose drainage catheter into the left upper quadrant abscess under ultrasound guidance, yielding 130 mL pus           Workstation performed: [id number]         FL barium swallow   Final Result by Windy Langston MD (01/28 1111)   Addendum 1 of 1 by Windy Langston MD (01/30 1426)   Addendum: In retrospect, on the last overhead lateral image there is    contrast material posterior to the mid aspect of the stent  Previously    there was was believed to be within the bowel  Based on CT examination of    1/30/2018 this likely represents a    site of leak posterior to the gastroesophageal junction  This was not    seen dynamically and likely represents a delayed leak  Final      1  Gastroesophageal stent identified without evidence for leak  Slow passage of contrast material through the stent  There is some persistent stasis of contrast material within the stent at the end of the exam    2   Reflux of contrast is noted to the upper esophagus  Workstation performed: [id number]         CT chest abdomen pelvis w contrast   Final Result by Jocy Henderson MD (01/30 8302)      There is extravasation of contrast in the upper abdomen in the periesophageal region with tracking of contrast in the perisplenic fluid collection and in the upper abdomen  Suggest persistent leak  This leak is better demonstrated on the barium swallow    performed on January 28, 2018       Multiple intra-abdominal fluid collections are decreasing in size      Bilateral large effusions with compressive atelectasis  I personally discussed this study with Dr Evaristo Soto   on 1/30/2018 2:03 PM                            Workstation performed: [id number]         XR chest portable   Final Result by Alma Archer MD (01/27 1953)      1  Tip of NG feeding tube in location of the duodenal bulb  2   Consolidation in the base of the left lower lobe  3   Small bilateral pleural effusions  Workstation performed: [id number]         XR abdomen 1 view kub   Final Result by Dione Doan MD (01/27 2905)      Keofeed tube tip overlies the distal gastric region, appears in position           Workstation performed: [id number]         VAS upper limb venous duplex scan, unilateral/limited   Final Result by Kira Parham MD (01/26 5171)      XR chest portable   Final Result by Gina Ramirez DO (01/26 2988)      ET tube terminates just below the clavicular heads  Workstation performed: [id number]         XR abdomen 1 view kub   Final Result by Chris Nicolas MD (01/25 2042)      Fluoroscopic guidance for placement of feeding tube with tip in the 1st portion of the duodenum  Please refer to the separate procedure notes for additional details  Workstation performed: [id number]         CT chest abdomen pelvis w contrast   Final Result by Nakul Patel MD (01/25 1159)      Extensive perihepatic subcapsular abscess which is in communication with a right paracolic gutter abscess  Drainage catheter within the left subhepatic space does not appear to communicate with this abscess  Large perisplenic abscess with mass effect on the spleen  Peripheral hypodensities within the spleen likely represent splenic infarcts  Infectious involvement less likely but not excluded  Large left paracolic gutter abscess  Mesenteric abscess as described  Pelvic abscess as described, pelvic drainage catheter is anterior to the abscess  Patient is status post placement of esophageal stent as well as gastric sleeve procedure  No evidence of extravasated oral contrast is identified on this exam       Dependent lower lobe atelectasis and small bilateral pleural effusions  Extensive subcutaneous edema suggesting anasarca  Soft tissue gas identified within the left axillary soft tissues, correlate for a potential iatrogenic etiology  If this does not exist, an infectious process related to a gas-forming organism is should be excluded  I personally discussed this study with Nia Quinn on 1/25/2018 11:58 AM          Workstation performed: [id number]         XR shunt series   Final Result by Nakul Patel MD (01/25 1116)      Intact  shunt catheter           Workstation performed: [id number]         XR chest portable   Final Result by Nakul Patel MD (01/25 1002)      No active disease  Workstation performed: [id number]         CT head wo contrast   Final Result by Anup Damon MD (01/25 2443)      No acute intracranial abnormality  Right frontal approach catheter terminating in the region of the third ventricle  Ventricular system is decompressed  No evidence for interstitial edema or extra-axial fluid collections  Workstation performed: [id number]         IR image guided aspiration / drainage w tube    (Results Pending)   CT head wo contrast    (Results Pending)         EKG, Pathology, and Other Studies: I have personally reviewed pertinent reports        VTE Pharmacologic Prophylaxis: Heparin    VTE Mechanical Prophylaxis: sequential compression device

## 2018-02-15 ENCOUNTER — APPOINTMENT (INPATIENT)
Dept: RADIOLOGY | Facility: HOSPITAL | Age: 43
DRG: 711 | End: 2018-02-15
Payer: COMMERCIAL

## 2018-02-15 LAB
ANION GAP SERPL CALCULATED.3IONS-SCNC: 5 MMOL/L (ref 4–13)
BACTERIA SPEC BFLD CULT: ABNORMAL
BASOPHILS # BLD AUTO: 0.03 THOUSANDS/ΜL (ref 0–0.1)
BASOPHILS NFR BLD AUTO: 0 % (ref 0–1)
BUN SERPL-MCNC: 19 MG/DL (ref 5–25)
CALCIUM SERPL-MCNC: 9.4 MG/DL (ref 8.3–10.1)
CHLORIDE SERPL-SCNC: 102 MMOL/L (ref 100–108)
CO2 SERPL-SCNC: 34 MMOL/L (ref 21–32)
CREAT SERPL-MCNC: 0.35 MG/DL (ref 0.6–1.3)
EOSINOPHIL # BLD AUTO: 0.28 THOUSAND/ΜL (ref 0–0.61)
EOSINOPHIL NFR BLD AUTO: 1 % (ref 0–6)
ERYTHROCYTE [DISTWIDTH] IN BLOOD BY AUTOMATED COUNT: 17.1 % (ref 11.6–15.1)
GFR SERPL CREATININE-BSD FRML MDRD: 134 ML/MIN/1.73SQ M
GLUCOSE SERPL-MCNC: 139 MG/DL (ref 65–140)
GLUCOSE SERPL-MCNC: 159 MG/DL (ref 65–140)
GLUCOSE SERPL-MCNC: 170 MG/DL (ref 65–140)
GLUCOSE SERPL-MCNC: 182 MG/DL (ref 65–140)
GLUCOSE SERPL-MCNC: 208 MG/DL (ref 65–140)
GRAM STN SPEC: ABNORMAL
GRAM STN SPEC: ABNORMAL
HCT VFR BLD AUTO: 31.1 % (ref 34.8–46.1)
HGB BLD-MCNC: 9.6 G/DL (ref 11.5–15.4)
LACTATE SERPL-SCNC: 1 MMOL/L (ref 0.5–2)
LYMPHOCYTES # BLD AUTO: 1.72 THOUSANDS/ΜL (ref 0.6–4.47)
LYMPHOCYTES NFR BLD AUTO: 9 % (ref 14–44)
MAGNESIUM SERPL-MCNC: 2 MG/DL (ref 1.6–2.6)
MCH RBC QN AUTO: 29.4 PG (ref 26.8–34.3)
MCHC RBC AUTO-ENTMCNC: 30.9 G/DL (ref 31.4–37.4)
MCV RBC AUTO: 95 FL (ref 82–98)
MONOCYTES # BLD AUTO: 1.03 THOUSAND/ΜL (ref 0.17–1.22)
MONOCYTES NFR BLD AUTO: 5 % (ref 4–12)
NEUTROPHILS # BLD AUTO: 16.93 THOUSANDS/ΜL (ref 1.85–7.62)
NEUTS SEG NFR BLD AUTO: 85 % (ref 43–75)
NRBC BLD AUTO-RTO: 0 /100 WBCS
PLATELET # BLD AUTO: 477 THOUSANDS/UL (ref 149–390)
PMV BLD AUTO: 9.2 FL (ref 8.9–12.7)
POTASSIUM SERPL-SCNC: 3.8 MMOL/L (ref 3.5–5.3)
PREALB SERPL-MCNC: 10.1 MG/DL (ref 18–40)
RBC # BLD AUTO: 3.27 MILLION/UL (ref 3.81–5.12)
SODIUM SERPL-SCNC: 141 MMOL/L (ref 136–145)
WBC # BLD AUTO: 20.1 THOUSAND/UL (ref 4.31–10.16)

## 2018-02-15 PROCEDURE — 94762 N-INVAS EAR/PLS OXIMTRY CONT: CPT

## 2018-02-15 PROCEDURE — 85025 COMPLETE CBC W/AUTO DIFF WBC: CPT | Performed by: SURGERY

## 2018-02-15 PROCEDURE — 74177 CT ABD & PELVIS W/CONTRAST: CPT

## 2018-02-15 PROCEDURE — 70450 CT HEAD/BRAIN W/O DYE: CPT

## 2018-02-15 PROCEDURE — 84134 ASSAY OF PREALBUMIN: CPT | Performed by: SURGERY

## 2018-02-15 PROCEDURE — 99233 SBSQ HOSP IP/OBS HIGH 50: CPT | Performed by: INTERNAL MEDICINE

## 2018-02-15 PROCEDURE — 82948 REAGENT STRIP/BLOOD GLUCOSE: CPT

## 2018-02-15 PROCEDURE — 99024 POSTOP FOLLOW-UP VISIT: CPT | Performed by: SURGERY

## 2018-02-15 PROCEDURE — 83605 ASSAY OF LACTIC ACID: CPT | Performed by: SURGERY

## 2018-02-15 PROCEDURE — 83735 ASSAY OF MAGNESIUM: CPT | Performed by: SURGERY

## 2018-02-15 PROCEDURE — 80048 BASIC METABOLIC PNL TOTAL CA: CPT | Performed by: SURGERY

## 2018-02-15 PROCEDURE — C9113 INJ PANTOPRAZOLE SODIUM, VIA: HCPCS | Performed by: NURSE PRACTITIONER

## 2018-02-15 RX ORDER — ACETAMINOPHEN 160 MG/5ML
975 SUSPENSION, ORAL (FINAL DOSE FORM) ORAL EVERY 8 HOURS SCHEDULED
Status: DISCONTINUED | OUTPATIENT
Start: 2018-02-15 | End: 2018-03-09 | Stop reason: HOSPADM

## 2018-02-15 RX ADMIN — HEPARIN SODIUM 5000 UNITS: 5000 INJECTION, SOLUTION INTRAVENOUS; SUBCUTANEOUS at 20:33

## 2018-02-15 RX ADMIN — Medication 3 G: at 19:27

## 2018-02-15 RX ADMIN — FLUCONAZOLE 400 MG: 2 INJECTION INTRAVENOUS at 15:30

## 2018-02-15 RX ADMIN — HYDROMORPHONE HYDROCHLORIDE 1 MG: 1 INJECTION, SOLUTION INTRAMUSCULAR; INTRAVENOUS; SUBCUTANEOUS at 03:34

## 2018-02-15 RX ADMIN — IOHEXOL 100 ML: 350 INJECTION, SOLUTION INTRAVENOUS at 10:59

## 2018-02-15 RX ADMIN — HYDROMORPHONE HYDROCHLORIDE 1 MG: 1 INJECTION, SOLUTION INTRAMUSCULAR; INTRAVENOUS; SUBCUTANEOUS at 06:35

## 2018-02-15 RX ADMIN — INSULIN LISPRO 1 UNITS: 100 INJECTION, SOLUTION INTRAVENOUS; SUBCUTANEOUS at 06:43

## 2018-02-15 RX ADMIN — HYDROMORPHONE HYDROCHLORIDE 1 MG: 1 INJECTION, SOLUTION INTRAMUSCULAR; INTRAVENOUS; SUBCUTANEOUS at 20:29

## 2018-02-15 RX ADMIN — HYDROMORPHONE HYDROCHLORIDE 1 MG: 1 INJECTION, SOLUTION INTRAMUSCULAR; INTRAVENOUS; SUBCUTANEOUS at 00:25

## 2018-02-15 RX ADMIN — Medication 3 G: at 08:42

## 2018-02-15 RX ADMIN — HYDROMORPHONE HYDROCHLORIDE 0.5 MG: 1 INJECTION, SOLUTION INTRAMUSCULAR; INTRAVENOUS; SUBCUTANEOUS at 12:49

## 2018-02-15 RX ADMIN — INSULIN LISPRO 1 UNITS: 100 INJECTION, SOLUTION INTRAVENOUS; SUBCUTANEOUS at 00:52

## 2018-02-15 RX ADMIN — METOPROLOL TARTRATE 10 MG: 1 INJECTION, SOLUTION INTRAVENOUS at 17:15

## 2018-02-15 RX ADMIN — INSULIN LISPRO 1 UNITS: 100 INJECTION, SOLUTION INTRAVENOUS; SUBCUTANEOUS at 17:17

## 2018-02-15 RX ADMIN — ONDANSETRON 4 MG: 2 INJECTION INTRAMUSCULAR; INTRAVENOUS at 11:14

## 2018-02-15 RX ADMIN — HYDROMORPHONE HYDROCHLORIDE 1 MG: 1 INJECTION, SOLUTION INTRAMUSCULAR; INTRAVENOUS; SUBCUTANEOUS at 10:08

## 2018-02-15 RX ADMIN — ONDANSETRON 4 MG: 2 INJECTION INTRAMUSCULAR; INTRAVENOUS at 19:27

## 2018-02-15 RX ADMIN — PANTOPRAZOLE SODIUM 40 MG: 40 INJECTION, POWDER, FOR SOLUTION INTRAVENOUS at 08:45

## 2018-02-15 RX ADMIN — METOPROLOL TARTRATE 10 MG: 1 INJECTION, SOLUTION INTRAVENOUS at 06:28

## 2018-02-15 RX ADMIN — HEPARIN SODIUM 5000 UNITS: 5000 INJECTION, SOLUTION INTRAVENOUS; SUBCUTANEOUS at 08:42

## 2018-02-15 RX ADMIN — HYDROMORPHONE HYDROCHLORIDE 1 MG: 1 INJECTION, SOLUTION INTRAMUSCULAR; INTRAVENOUS; SUBCUTANEOUS at 17:15

## 2018-02-15 RX ADMIN — Medication 3 G: at 02:00

## 2018-02-15 RX ADMIN — Medication 3 G: at 14:37

## 2018-02-15 RX ADMIN — METOPROLOL TARTRATE 10 MG: 1 INJECTION, SOLUTION INTRAVENOUS at 12:37

## 2018-02-15 RX ADMIN — METOPROLOL TARTRATE 10 MG: 1 INJECTION, SOLUTION INTRAVENOUS at 00:25

## 2018-02-15 RX ADMIN — ACETAMINOPHEN 975 MG: 160 SUSPENSION ORAL at 12:37

## 2018-02-15 NOTE — PROGRESS NOTES
Progress Note - General Surgery   Ronna Hairston 36 y o  female MRN: [id number]  Unit/Bed#: [facility] 834-01 Encounter: [id number]    Assessment:  36y o -year-old female with gastric perforation s/p hiatal hernia repair at outside hospital, s/p esophageal stent placement, exploratory laparotomy and washout  Has had persistent esophageal/gastric leak on imaging  Had bilateral pleural effusions which were drained  Perisplenic fluid collection was drained          Plan:  - continue tube feeds at goal  - CTAP with PO bariatric protocol and IV contrast  - monitor subcutaneous emphysema  - unasyn/diflucan per ID until 2/19  - OOB/ambulate  - PT/OT  - SQH/SCDs  - dispo planning         Subjective/Objective   Chief Complaint:     Subjective: Tolerating tube feeds, passing flatus and having BM    Objective:     Blood pressure 140/87, pulse 104, temperature 98 4 °F (36 9 °C), temperature source Oral, resp  rate 16, height 5' 4" (1 626 m), weight 64 kg (141 lb 1 5 oz), SpO2 94 %, not currently breastfeeding  ,Body mass index is 24 22 kg/m²  Intake/Output Summary (Last 24 hours) at 02/15/18 0756  Last data filed at 02/15/18 0515   Gross per 24 hour   Intake              350 ml   Output             1920 ml   Net            -1570 ml       Invasive Devices     Peripherally Inserted Central Catheter Line            PICC Line 10/48/85 Right Basilic 25 days          Drain            Closed/Suction Drain Left LLQ Bulb 19 Fr  20 days    Closed/Suction Drain Left LUQ Bulb 19 Fr  20 days    Closed/Suction Drain Right RLQ Bulb 19 Fr  20 days    Closed/Suction Drain Right RUQ Accordion 19 Fr  20 days    Closed/Suction Drain Left Back Bulb 12 Fr   14 days    NG/OG/Enteral Tube Enteral Feeding Tube Right nares 14 days                Physical Exam:   NAD  RRR  Norm resp effort  Abd soft, distended, NT, ADENIKE x4 with very minimal output in bulbs, thin serosang  Perisplenic drain (LUQ) serosang  TF's at 59 and tolerating      Lab, Imaging and other studies:  I have personally reviewed pertinent lab results    , CBC:   Lab Results   Component Value Date    WBC 20 10 (H) 02/15/2018    HGB 9 6 (L) 02/15/2018    HCT 31 1 (L) 02/15/2018    MCV 95 02/15/2018     (H) 02/15/2018    MCH 29 4 02/15/2018    MCHC 30 9 (L) 02/15/2018    RDW 17 1 (H) 02/15/2018    MPV 9 2 02/15/2018    NRBC 0 02/15/2018   , CMP:   Lab Results   Component Value Date     02/15/2018    K 3 8 02/15/2018     02/15/2018    CO2 34 (H) 02/15/2018    ANIONGAP 5 02/15/2018    BUN 19 02/15/2018    CREATININE 0 35 (L) 02/15/2018    GLUCOSE 170 (H) 02/15/2018    CALCIUM 9 4 02/15/2018    EGFR 134 02/15/2018     VTE Pharmacologic Prophylaxis: Sequential compression device (Venodyne)   VTE Mechanical Prophylaxis: sequential compression device

## 2018-02-15 NOTE — SOCIAL WORK
CM met with pt and spoke with pt mom shima , all aware cm role at discharge   Cm provided all contact information and aware cm role with discharge planning   Pt was transfer from [facility]   Pt and mom aware [facility] not willing [facility] accept , they do not accept her insurance  Cm offered to make referrals to [facility] but that is to far for family   Cm will follow , pt not medially clear at this time  Pt has tube feedings , NPO , iv abx , 5 ADENIKE drains and picc line

## 2018-02-15 NOTE — PROGRESS NOTES
Progress Note - Infectious Disease   Miladys Devlin 36 y o  female MRN: [id number]  Unit/Bed#: [facility] 834-01 Encounter: [id number]      Impression/Recommendations:  1   Intra-abdominal/pelvic abscesses   Patient is status post multiple abdominal washouts   She has multiple drains in place   She is status post placement of drainage in a new perisplenic collection   She is clinically well and systemically stable  Operative culture from [facility], growing only ampicillin susceptible Enterococcus   Operative culture here also growing  only ampicillin susceptible Enterococcus   Latest culture also grew Saccharomyces, most likely colonization   Repeat CT from from over weekend showed enlarging left-sided collection around esophageal leukocyte   Patient is status post drainage of left retroperitoneal collection on 02/12   WBC decreasing since this last drainage  Culture is growing Enterococcus again  CT from today shows continued decrease in collection size  Continue with IV Unasyn/fluconazole  Continue drainage  Treat x at least 7 days from last drainage      2   Possible infected  shunt   Given presence of tip of  shunt in peritoneal space, there is high probability of  shunt infection   Fortunately, patient has no symptoms concerning for meningitis   She has neurological deficits  Rubén Siegel is status post tapping of  shunt at [facility]   CSF culture there had no growth but patient had prior antibiotic  Van song go ahead and treat her for possible/presumptive  shunt infection   Patient is now status post  shunt resection   CSF culture here also negative   With removal shunt, antibiotic course can be decreased     Antibiotic plan as in above    Treat x 2 post VPS resection, another 4 days      3   Gastric perforation, status post repair, with persistent leak   She is now status post placement of esophageal stent   Unfortunately, repeat abdomen/pelvis CT showed recurrent leak with new perisplenic collection  Azeem Rey is now status post placement of duodenal feeding tube and drainage of the perisplenic collection   Unfortunately, barium swallow shows persistent esophageal leak   Patient is now tolerating enteral feeding via feeding tube in duodenum  Monitor for recurrent leak  Plan for repeat barium swallow later today noted      4   Bilateral pleural effusion, status post chest tube placement   This is most likely sympathetic effusion, secondary to intra-abdominal abscesses   Chest tubes have been removed  Patient remains comfortable      5  Recurrent leukocytosis   WBC has improved but still remains in the 20s, with low-grade fever   Enlarging left-sided collection around esophageal leak at site may be responsible for this    WBC decreasing again after further drainage, stable today  Antibiotic plan as in above  Monitor WBC and temperature      6   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   No clinical signs of cellulitis   Patient has no headache    wound is for this sutured  Drainage/leakage appears to be resolving  Monitor      Discussed with the patient and her fiance in detail regarding above plan      Antibiotics:  Unasyn  Fluconazole # 17  POD # 21  Post VPS extraction # 10  Post/drainage # 3     Subjective:  Barium swallow with persistent esophageal leak  Patient is is disappointed, but otherwise comfortable  Abdominal pain is improved and controlled     No headache   No chest wall pain   Chest wall with no further leakage  She is awake and alert  Temperature stays down   No chills  She is tolerating antibiotics well   No nausea, vomiting or diarrhea  She is tolerating tube feed well      Objective:  Vitals:  HR:  [] 110  Resp:  [16-18] 16  BP: (127-143)/(71-87) 133/78  SpO2:  [92 %-96 %] 92 %  Temp (24hrs), Av 4 °F (36 9 °C), Min:98 °F (36 7 °C), Max:98 7 °F (37 1 °C)  Current: Temperature: 98 7 °F (37 1 °C)    Physical Exam:     General: Awake, alert, cooperative, no distress  Head:  Incision healing  No drainage  No erythema  No tenderness  Chest:  Incision healing  No purulence  No erythema/warmth  No tenderness  Lungs: Expansion symmetric, no rales, no wheezing, respirations unlabored  Heart[de-identified]  Regular rate and rhythm, S1 and S2 normal, no murmur  Abdomen: Soft, mildly distended  Incision clean  No purulence  Drains still seropurulent  Mild tenderness  Extremities: Stable edema  No erythema/warmth  No ulcer  Nontender to palpation  Skin:  No rash  Invasive Devices     Peripherally Inserted Central Catheter Line            PICC Line 30/74/40 Right Basilic 25 days          Drain            Closed/Suction Drain Left LLQ Bulb 19 Fr  20 days    Closed/Suction Drain Left LUQ Bulb 19 Fr  20 days    Closed/Suction Drain Right RLQ Bulb 19 Fr  20 days    Closed/Suction Drain Right RUQ Accordion 19 Fr  20 days    Closed/Suction Drain Left Back Bulb 12 Fr  15 days    NG/OG/Enteral Tube Enteral Feeding Tube Right nares 15 days                Labs studies:   I have personally reviewed pertinent labs  Results from last 7 days  Lab Units 02/15/18  0515 02/14/18  0446 02/13/18  0512   SODIUM mmol/L 141 145 143   POTASSIUM mmol/L 3 8 3 7 3 6   CHLORIDE mmol/L 102 105 104   CO2 mmol/L 34* 34* 34*   ANION GAP mmol/L 5 6 5   BUN mg/dL 19 18 20   CREATININE mg/dL 0 35* 0 35* 0 42*   EGFR ml/min/1 73sq m 134 134 126   GLUCOSE RANDOM mg/dL 170* 152* 43*   CALCIUM mg/dL 9 4 9 7 10 0       Results from last 7 days  Lab Units 02/15/18  0515 02/14/18  0446 02/13/18  0512   WBC Thousand/uL 20 10* 17 73* 19 04*   HEMOGLOBIN g/dL 9 6* 9 6* 9 3*   PLATELETS Thousands/uL 477* 514* 521*       Results from last 7 days  Lab Units 02/12/18  1635   GRAM STAIN RESULT  2+ Polys  No bacteria seen   BODY FLUID CULTURE, STERILE  2+ Growth of Enterococcus faecalis*       Imaging Studies:   I have personally reviewed pertinent imaging study reports and images in PACS    Abdomen/pelvis CT reviewed personally  All collections with decreased size  EKG, Pathology, and Other Studies:   I have personally reviewed pertinent reports

## 2018-02-15 NOTE — PROGRESS NOTES
Patient care rounds were completed with the patient's nurse today, Tata Hobbs     We discussed the plan is to continue to keep her NPO and on tube feeds for enteral nutrition  Continue conservative approach for a gastric leak and allow her to hopefully heal without surgical intervention with stents in place  Continue all current drains none continue to monitor output  Continue long-term IV antimicrobial therapy via the PICC line  We reviewed all of the invasive devices/lines/telemetry orders   - Continue right-sided PICC line for long-term IV antimicrobial therapy  Pain Assessment / Plan:  - Add scheduled liquid Tylenol via the calf at tube and otherwise continue current analgesic regimen  Mobility Assessment / Plan:  - Continue to be out of bed and ambulating with assistance  - PT and OT evaluation and treatment as indicated  Goals / Barriers for discharge:  - Further inpatient care for enteral nutrition and IV antibiotics as well as management of her postoperative gastric leak  - Case management following  All questions and concerns were addressed  I spent greater than 15 minutes reviewing the plan with the patient and the nurse, and coordinating her care for the day      Dinh Roper PA-C  2/15/2018 12:10 PM

## 2018-02-15 NOTE — CASE MANAGEMENT
Thank you,  [facility] in the [facility] for 2017  Network Utilization Review Department  Phone: [phone]; Fax [phone]  ATTENTION: The Network Utilization Review Department is now centralized for our 7 Facilities  Make a note that we have a new phone and fax numbers for our Department  Please call with any questions or concerns to [phone] and carefully follow the prompts so that you are directed to the right person  All voicemails are confidential  Fax any determinations, approvals, denials, and requests for initial or continue stay review clinical to [phone]  Due to HIGH CALL volume, it would be easier if you could please send faxed requests to expedite your requests and in part, help us provide discharge notifications faster  Continued Stay Review    Date: 2/15/18    Vital Signs: /78 (BP Location: Left arm)   Pulse (!) 110 Comment: RN aware  Temp 98 7 °F (37 1 °C) (Oral)   Resp 16   Ht 5' 4" (1 626 m)   Wt 64 kg (141 lb 1 5 oz)   LMP  (LMP Unknown)   SpO2 92%   Breastfeeding?  No   BMI 24 22 kg/m²     Medications:   Scheduled Meds:   Current Facility-Administered Medications:  acetaminophen 975 mg Oral [facility] Hari Hicks PA-C    acetaminophen 325 mg Rectal Q4H PRN Bienvenido Etienne MD    ampicillin-sulbactam 3 g Intravenous Q6H Mila Kramer MD Last Rate: 3 g (02/15/18 1437)   bisacodyl 10 mg Rectal Daily PRN Bienvenido Etienne MD    fluconazole 400 mg Intravenous Q24H ABNER Arevalo Last Rate: 400 mg (02/15/18 1530)   gabapentin 300 mg Oral HS Yoseph Meeks MD    heparin (porcine) 5,000 Units Subcutaneous Q12H [address] Yoseph Meeks MD    HYDROmorphone 0 5 mg Intravenous Q3H PRN Augustus Thrasher PA-C    HYDROmorphone 1 mg Intravenous Q3H PRN Glenn Gutierrez MD    insulin lispro 1-5 Units Subcutaneous Q6H [address] Nayla Mcconnell PA-C    iohexol 50 mL Oral 90 min pre-procedure Roddy Shaikh MD iohexol 50 mL Oral 90 min pre-procedure John Paul Parker MD    iohexol 50 mL Oral 60 Min Pre-Op Marcia Baileyo, AURELIO    menthol-methyl salicylate  Apply externally 4x Daily PRN Shiela Anderson MD    metoprolol 10 mg Intravenous Q6H Ligia Andino MD    mirtazapine 15 mg Oral HS Delco Cornea, AURELIO    nortriptyline 10 mg Oral BID Ligia Andino MD    ondansetron 4 mg Intravenous Q4H PRN Calvin Carrizales MD    pantoprazole 40 mg Intravenous Q24H [facility] ABNER Lara      PRN Meds: acetaminophen    bisacodyl    HYDROmorphone 0 5 mg x 1 today     HYDROmorphone 1mg x 4 so far today     menthol-methyl salicylate    Ondansetron IV x 1 today     Abnormal Labs/Diagnostic Results:    02/15/18 0515   WBC 20 10     RBC 3 27     Hemoglobin 9 6     Hematocrit 31 1     MCHC 30 9     RDW 17 1     Platelets 549     Neutrophils Relative 85     Lymphocytes Relative 9     Neutrophils Absolute 16 93       Co2 34  Creat 0 35  Glucose 170  POC glucose 182, 159  Prealbumin 10 1    Age/Sex: 36 y o  female     Assessment/Plan:   2/15 Surgery Progress Note  36y o -year-old female with gastric perforation s/p hiatal hernia repair at outside hospital, s/p esophageal stent placement, exploratory laparotomy and washout  Has had persistent esophageal/gastric leak on imaging  Had bilateral pleural effusions which were drained  Perisplenic fluid collection was drained        We discussed the plan is to continue to keep her NPO and on tube feeds for enteral nutrition  Continue conservative approach for a gastric leak and allow her to hopefully heal without surgical intervention with stents in place  Continue all current drains none continue to monitor output    Continue long-term IV antimicrobial therapy via the PICC line      We reviewed all of the invasive devices/lines/telemetry orders   - Continue right-sided PICC line for long-term IV antimicrobial therapy      Pain Assessment / Plan:  - Add scheduled liquid Tylenol via the calf at tube and otherwise continue current analgesic regimen      Mobility Assessment / Plan:  - Continue to be out of bed and ambulating with assistance  - PT and OT evaluation and treatment as indicated      Goals / Barriers for discharge:  - Further inpatient care for enteral nutrition and IV antibiotics as well as management of her postoperative gastric leak    - Case management following      Plan:  - continue tube feeds at goal  - CTAP with PO bariatric protocol and IV contrast  - monitor subcutaneous emphysema  - unasyn/diflucan per ID until 2/19  - OOB/ambulate  - PT/OT  - SQH/SCDs  - dispo planning    Discharge Plan: Possible ST rehab

## 2018-02-15 NOTE — NUTRITION
02/15/18 1453   Recommendations/Interventions   Summary Patient remains NPO with post-pyloric keofeed in place  Currently tolerating EN @ goal rate, recent weight loss noted  Patient with new perisplenic collection  Repeat barium swallow completed 2/14: shows persistant leak  Interventions EN change formula/rate   Nutrition Recommendations Tube Feeding Recommendation provided  (Secondary to weight loss suggest increase EN to: Jevity 1 2 kcal @ a goal rate of 70 ml/hr with 135 ml free h2o flush every 6 hours (2016 kcal, 93 gms pro, 1895 ml tv)   Monitor weight and electrolytes  )

## 2018-02-16 ENCOUNTER — APPOINTMENT (INPATIENT)
Dept: RADIOLOGY | Facility: HOSPITAL | Age: 43
DRG: 711 | End: 2018-02-16
Payer: COMMERCIAL

## 2018-02-16 LAB
ANION GAP SERPL CALCULATED.3IONS-SCNC: 4 MMOL/L (ref 4–13)
BASOPHILS # BLD AUTO: 0.04 THOUSANDS/ΜL (ref 0–0.1)
BASOPHILS NFR BLD AUTO: 0 % (ref 0–1)
BUN SERPL-MCNC: 19 MG/DL (ref 5–25)
CALCIUM SERPL-MCNC: 9.4 MG/DL (ref 8.3–10.1)
CHLORIDE SERPL-SCNC: 101 MMOL/L (ref 100–108)
CO2 SERPL-SCNC: 35 MMOL/L (ref 21–32)
CREAT SERPL-MCNC: 0.37 MG/DL (ref 0.6–1.3)
EOSINOPHIL # BLD AUTO: 0.44 THOUSAND/ΜL (ref 0–0.61)
EOSINOPHIL NFR BLD AUTO: 2 % (ref 0–6)
ERYTHROCYTE [DISTWIDTH] IN BLOOD BY AUTOMATED COUNT: 17.3 % (ref 11.6–15.1)
GFR SERPL CREATININE-BSD FRML MDRD: 131 ML/MIN/1.73SQ M
GLUCOSE SERPL-MCNC: 106 MG/DL (ref 65–140)
GLUCOSE SERPL-MCNC: 121 MG/DL (ref 65–140)
GLUCOSE SERPL-MCNC: 159 MG/DL (ref 65–140)
GLUCOSE SERPL-MCNC: 173 MG/DL (ref 65–140)
GLUCOSE SERPL-MCNC: 182 MG/DL (ref 65–140)
GLUCOSE SERPL-MCNC: 182 MG/DL (ref 65–140)
HCT VFR BLD AUTO: 29.5 % (ref 34.8–46.1)
HGB BLD-MCNC: 9.2 G/DL (ref 11.5–15.4)
LYMPHOCYTES # BLD AUTO: 1.38 THOUSANDS/ΜL (ref 0.6–4.47)
LYMPHOCYTES NFR BLD AUTO: 6 % (ref 14–44)
MCH RBC QN AUTO: 29.7 PG (ref 26.8–34.3)
MCHC RBC AUTO-ENTMCNC: 31.2 G/DL (ref 31.4–37.4)
MCV RBC AUTO: 95 FL (ref 82–98)
MONOCYTES # BLD AUTO: 1.12 THOUSAND/ΜL (ref 0.17–1.22)
MONOCYTES NFR BLD AUTO: 5 % (ref 4–12)
NEUTROPHILS # BLD AUTO: 18.71 THOUSANDS/ΜL (ref 1.85–7.62)
NEUTS SEG NFR BLD AUTO: 87 % (ref 43–75)
NRBC BLD AUTO-RTO: 0 /100 WBCS
PLATELET # BLD AUTO: 462 THOUSANDS/UL (ref 149–390)
PMV BLD AUTO: 9.9 FL (ref 8.9–12.7)
POTASSIUM SERPL-SCNC: 3.6 MMOL/L (ref 3.5–5.3)
RBC # BLD AUTO: 3.1 MILLION/UL (ref 3.81–5.12)
SODIUM SERPL-SCNC: 140 MMOL/L (ref 136–145)
WBC # BLD AUTO: 21.81 THOUSAND/UL (ref 4.31–10.16)

## 2018-02-16 PROCEDURE — 80048 BASIC METABOLIC PNL TOTAL CA: CPT | Performed by: SURGERY

## 2018-02-16 PROCEDURE — 99233 SBSQ HOSP IP/OBS HIGH 50: CPT | Performed by: INTERNAL MEDICINE

## 2018-02-16 PROCEDURE — 82948 REAGENT STRIP/BLOOD GLUCOSE: CPT

## 2018-02-16 PROCEDURE — 77012 CT SCAN FOR NEEDLE BIOPSY: CPT

## 2018-02-16 PROCEDURE — 99152 MOD SED SAME PHYS/QHP 5/>YRS: CPT | Performed by: RADIOLOGY

## 2018-02-16 PROCEDURE — C1729 CATH, DRAINAGE: HCPCS

## 2018-02-16 PROCEDURE — 49406 IMAGE CATH FLUID PERI/RETRO: CPT

## 2018-02-16 PROCEDURE — C1769 GUIDE WIRE: HCPCS

## 2018-02-16 PROCEDURE — 49406 IMAGE CATH FLUID PERI/RETRO: CPT | Performed by: RADIOLOGY

## 2018-02-16 PROCEDURE — 85025 COMPLETE CBC W/AUTO DIFF WBC: CPT | Performed by: SURGERY

## 2018-02-16 PROCEDURE — C9113 INJ PANTOPRAZOLE SODIUM, VIA: HCPCS | Performed by: NURSE PRACTITIONER

## 2018-02-16 PROCEDURE — 94762 N-INVAS EAR/PLS OXIMTRY CONT: CPT

## 2018-02-16 PROCEDURE — 0W9F30Z DRAINAGE OF ABDOMINAL WALL WITH DRAINAGE DEVICE, PERCUTANEOUS APPROACH: ICD-10-PCS | Performed by: RADIOLOGY

## 2018-02-16 RX ORDER — MIDAZOLAM HYDROCHLORIDE 1 MG/ML
INJECTION INTRAMUSCULAR; INTRAVENOUS CODE/TRAUMA/SEDATION MEDICATION
Status: COMPLETED | OUTPATIENT
Start: 2018-02-16 | End: 2018-02-16

## 2018-02-16 RX ORDER — FENTANYL CITRATE 50 UG/ML
INJECTION, SOLUTION INTRAMUSCULAR; INTRAVENOUS CODE/TRAUMA/SEDATION MEDICATION
Status: COMPLETED | OUTPATIENT
Start: 2018-02-16 | End: 2018-02-16

## 2018-02-16 RX ADMIN — Medication 3 G: at 22:34

## 2018-02-16 RX ADMIN — ACETAMINOPHEN 975 MG: 160 SUSPENSION ORAL at 06:05

## 2018-02-16 RX ADMIN — HYDROMORPHONE HYDROCHLORIDE 1 MG: 1 INJECTION, SOLUTION INTRAMUSCULAR; INTRAVENOUS; SUBCUTANEOUS at 10:04

## 2018-02-16 RX ADMIN — HYDROMORPHONE HYDROCHLORIDE 1 MG: 1 INJECTION, SOLUTION INTRAMUSCULAR; INTRAVENOUS; SUBCUTANEOUS at 20:18

## 2018-02-16 RX ADMIN — PANTOPRAZOLE SODIUM 40 MG: 40 INJECTION, POWDER, FOR SOLUTION INTRAVENOUS at 10:04

## 2018-02-16 RX ADMIN — Medication 3 G: at 01:45

## 2018-02-16 RX ADMIN — METOPROLOL TARTRATE 10 MG: 1 INJECTION, SOLUTION INTRAVENOUS at 06:06

## 2018-02-16 RX ADMIN — HYDROMORPHONE HYDROCHLORIDE 1 MG: 1 INJECTION, SOLUTION INTRAMUSCULAR; INTRAVENOUS; SUBCUTANEOUS at 06:05

## 2018-02-16 RX ADMIN — FENTANYL CITRATE 50 MCG: 50 INJECTION, SOLUTION INTRAMUSCULAR; INTRAVENOUS at 21:17

## 2018-02-16 RX ADMIN — METOPROLOL TARTRATE 10 MG: 1 INJECTION, SOLUTION INTRAVENOUS at 23:32

## 2018-02-16 RX ADMIN — HYDROMORPHONE HYDROCHLORIDE 0.2 MG: 1 INJECTION, SOLUTION INTRAMUSCULAR; INTRAVENOUS; SUBCUTANEOUS at 01:40

## 2018-02-16 RX ADMIN — Medication 3 G: at 16:30

## 2018-02-16 RX ADMIN — INSULIN LISPRO 1 UNITS: 100 INJECTION, SOLUTION INTRAVENOUS; SUBCUTANEOUS at 06:06

## 2018-02-16 RX ADMIN — HYDROMORPHONE HYDROCHLORIDE 1 MG: 1 INJECTION, SOLUTION INTRAMUSCULAR; INTRAVENOUS; SUBCUTANEOUS at 16:58

## 2018-02-16 RX ADMIN — FLUCONAZOLE 400 MG: 2 INJECTION INTRAVENOUS at 17:05

## 2018-02-16 RX ADMIN — Medication 3 G: at 11:03

## 2018-02-16 RX ADMIN — HYDROMORPHONE HYDROCHLORIDE 1 MG: 1 INJECTION, SOLUTION INTRAMUSCULAR; INTRAVENOUS; SUBCUTANEOUS at 00:16

## 2018-02-16 RX ADMIN — ACETAMINOPHEN 975 MG: 160 SUSPENSION ORAL at 00:16

## 2018-02-16 RX ADMIN — HEPARIN SODIUM 5000 UNITS: 5000 INJECTION, SOLUTION INTRAVENOUS; SUBCUTANEOUS at 10:04

## 2018-02-16 RX ADMIN — INSULIN LISPRO 1 UNITS: 100 INJECTION, SOLUTION INTRAVENOUS; SUBCUTANEOUS at 13:31

## 2018-02-16 RX ADMIN — INSULIN LISPRO 1 UNITS: 100 INJECTION, SOLUTION INTRAVENOUS; SUBCUTANEOUS at 00:39

## 2018-02-16 RX ADMIN — HYDROMORPHONE HYDROCHLORIDE 1 MG: 1 INJECTION, SOLUTION INTRAMUSCULAR; INTRAVENOUS; SUBCUTANEOUS at 13:31

## 2018-02-16 RX ADMIN — METOPROLOL TARTRATE 10 MG: 1 INJECTION, SOLUTION INTRAVENOUS at 13:30

## 2018-02-16 RX ADMIN — MIDAZOLAM 1 MG: 1 INJECTION INTRAMUSCULAR; INTRAVENOUS at 21:30

## 2018-02-16 RX ADMIN — HEPARIN SODIUM 5000 UNITS: 5000 INJECTION, SOLUTION INTRAVENOUS; SUBCUTANEOUS at 20:16

## 2018-02-16 RX ADMIN — METOPROLOL TARTRATE 10 MG: 1 INJECTION, SOLUTION INTRAVENOUS at 00:19

## 2018-02-16 RX ADMIN — FENTANYL CITRATE 50 MCG: 50 INJECTION, SOLUTION INTRAMUSCULAR; INTRAVENOUS at 21:28

## 2018-02-16 RX ADMIN — MIDAZOLAM 2 MG: 1 INJECTION INTRAMUSCULAR; INTRAVENOUS at 21:17

## 2018-02-16 RX ADMIN — MIDAZOLAM 0.5 MG: 1 INJECTION INTRAMUSCULAR; INTRAVENOUS at 21:36

## 2018-02-16 RX ADMIN — FENTANYL CITRATE 50 MCG: 50 INJECTION, SOLUTION INTRAMUSCULAR; INTRAVENOUS at 21:42

## 2018-02-16 RX ADMIN — METOPROLOL TARTRATE 10 MG: 1 INJECTION, SOLUTION INTRAVENOUS at 18:30

## 2018-02-16 NOTE — PROGRESS NOTES
Progress Note - Infectious Disease   Erica Dumas 36 y o  female MRN: [id number]  Unit/Bed#: [facility] 834-01 Encounter: [id number]      Impression/Recommendations:  1   Intra-abdominal/pelvic abscesses   Patient is status post multiple abdominal washouts   She has multiple drains in place   She is status post placement of drainage in a new perisplenic collection   She is clinically well and systemically stable  Operative cultures consistently growing  only ampicillin susceptible Enterococcus   A single culture also grew Saccharomyces, most likely colonization   Patient's last drainage was placed on 02/12  [facility] decreasing slowly since this last drainage  Culture is growing Enterococcus again  CT from 2/15 shows continued decrease in collection size  Continue with IV Unasyn/fluconazole  Continue drainage  Treat x at least 7 days from last drainage      2   Possible infected  shunt   Given presence of tip of  shunt in peritoneal space, there is high probability of  shunt infection   Fortunately, patient has no symptoms concerning for meningitis   She has neurological deficits  Obie Anamika is status post tapping of  shunt at [facility]   CSF culture there had no growth but patient had prior antibiotic  Ursula Clayton will go ahead and treat her for possible/presumptive  shunt infection   Patient is now status post  shunt resection   CSF culture here also negative   With removal shunt, antibiotic course can be decreased     Antibiotic plan as in above    Treat x 2 post VPS resection, another 3 days      3   Gastric perforation, status post repair, with persistent leak   She is now status post placement of esophageal stent   Unfortunately, repeat abdomen/pelvis CT showed recurrent leak with new perisplenic collection   She is now status post placement of duodenal feeding tube and drainage of the perisplenic collection   Unfortunately, barium swallow shows persistent esophageal leak   Patient is tolerating enteral feeding via feeding tube in duodenum  Monitor for recurrent leak      4   Bilateral pleural effusion, status post chest tube placement   This is most likely sympathetic effusion, secondary to intra-abdominal abscesses   Chest tubes have been removed   Patient remains comfortable      5  Recurrent leukocytosis   WBC has improved but still remains in the 20s, with low-grade fever   Enlarging left-sided collection around esophageal leak at site may be responsible for this    WBC decreasing again after further drainage, but increase in slightly home last 2 days  Antibiotic plan as in above  Monitor WBC and temperature closely      6   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   No clinical signs of cellulitis   Patient has no headache    wound is for this sutured   Drainage/leakage appears to be resolving  Monitor      Discussed with the patient and her fiance in detail regarding above plan      Antibiotics:  Unasyn  Fluconazole # 18  POD # 22  Post VPS extraction # 11  Post/drainage # 4     Subjective:  Patient is comfortable  Abdominal pain is improving and controlled     No headache   No chest wall pain   Chest wall with no further leakage  She is awake and alert  Temperature stays down   No chills  She is tolerating antibiotics well   No nausea, vomiting or diarrhea  She is tolerating tube feed well  Objective:  Vitals:  HR:  [] 102  Resp:  [16-20] 18  BP: (122-143)/(72-80) 143/80  SpO2:  [92 %-98 %] 98 %  Temp (24hrs), Av 5 °F (36 9 °C), Min:98 °F (36 7 °C), Max:99 °F (37 2 °C)  Current: Temperature: 98 °F (36 7 °C)    Physical Exam:     General: Awake, alert, cooperative, no distress  Head:  Incision clean  No drainage  No erythema/warmth  No tenderness  Chest:  Incision healing  No purulence  No erythema/warmth  No tenderness  Lungs: Expansion symmetric, no rales, no wheezing, respirations unlabored     Heart[de-identified]  Tachycardic with regular rhythm, S1 and S2 normal, no murmur  Abdomen: Soft, nondistended  Incision clean  No drainage/erythema  Mild incisional tenderness  Drains still seropurulent but clearer  Extremities: No edema  No erythema/warmth  No ulcer  Nontender to palpation  Skin:  No rash  Invasive Devices     Peripherally Inserted Central Catheter Line            PICC Line 25/63/34 Right Basilic 26 days          Drain            Closed/Suction Drain Left LLQ Bulb 19 Fr  21 days    Closed/Suction Drain Left LUQ Bulb 19 Fr  21 days    Closed/Suction Drain Left Back Bulb 12 Fr  16 days    NG/OG/Enteral Tube Enteral Feeding Tube Right nares 16 days                Labs studies:   I have personally reviewed pertinent labs  Results from last 7 days  Lab Units 02/16/18  0537 02/15/18  0515 02/14/18  0446   SODIUM mmol/L 140 141 145   POTASSIUM mmol/L 3 6 3 8 3 7   CHLORIDE mmol/L 101 102 105   CO2 mmol/L 35* 34* 34*   ANION GAP mmol/L 4 5 6   BUN mg/dL 19 19 18   CREATININE mg/dL 0 37* 0 35* 0 35*   EGFR ml/min/1 73sq m 131 134 134   GLUCOSE RANDOM mg/dL 173* 170* 152*   CALCIUM mg/dL 9 4 9 4 9 7       Results from last 7 days  Lab Units 02/16/18  0537 02/15/18  0515 02/14/18  0446   WBC Thousand/uL 21 81* 20 10* 17 73*   HEMOGLOBIN g/dL 9 2* 9 6* 9 6*   PLATELETS Thousands/uL 462* 477* 514*       Results from last 7 days  Lab Units 02/12/18  1635   GRAM STAIN RESULT  2+ Polys  No bacteria seen   BODY FLUID CULTURE, STERILE  2+ Growth of Enterococcus faecalis*       Imaging Studies:   I have personally reviewed pertinent imaging study reports and images in PACS  EKG, Pathology, and Other Studies:   I have personally reviewed pertinent reports

## 2018-02-16 NOTE — POST OP PROGRESS NOTES
Progress Note - Neurosurgery   Franklyn Salinas 36 y o  female MRN: [id number]  Unit/Bed#: [facility] 834-01 Encounter: [id number]    Assessment:  1  POD10 removal of VPS  2  Incisional drainage - resolved  3  History of ventriculoperitoneal shunt for idiopathic intracranial hypertension (originally placed May 30, 2017)  4  Left upper extremity occlusive thrombophlebitis cephalic vein  5  Peritonitis   6  Gastric perforation status post repair    Plan:  25-year-old female transfer [facility] for esophageal stent status post upper gastric injury during laparoscopic paraesophageal hernia repair  Patient developed sepsis and required multiple operative procedures  Upon transfer to [facility], she underwent a esophageal stent and externalization ventriculoperitoneal shunt which has since been completed removed  · Exam reveals with diffuse weakness throughout  Voice soft but appropriate  Interactive  Following commands  No focal findings  · Scalp Incision CDI without erythema edema or drainage  · Right chest incision clean dry intact following sutures placed 2/13/18  Dressing dry  No erythema edema or drainage  No subcutaneous collections noted  Incision is flat  · New dry sterile bandage placed  May leave open to air as of 2/16/18  · Pain control per primary team   · Ongoing medical management per primary team   Drain management  Trend white count and temperature profile  · Infectious disease following - continue on IV Unasyn and fluconazole trough 2/19/18  · Mobilized out of bed  Encouraged mobilization out of bed  PT/OT - recommending rehab  · DVT PPX:  Heparin and SCDs on during exam   · No further neurosurgical intervention anticipated at this juncture  · Repeat CT head 2/15/18 for baseline following shunt removal  No acute intracranial abnormalities  S/p shunt removal  Ventricles increase from prior study 1/25/18  Prior slit like and now slightly larger and appropriate for age  · Patient may require lumbar puncture for opening and closing pressure if she develops any collections, further incisional drainage, headaches or vision changes  · Consider neurology evaluation for consideration of medical treatment for pseudotumor cerebri since shunt has been removed  · Will see as needed during remainder of hospitalization  · Call if any questions or concerns including headaches, vision changes or incisional drainage  Subjective/Objective   Chief Complaint: "I'm tired"    Subjective: Patient admits to fatigue  States working with therapy but still with diffuse weakness  Denies HA or vision complaints  No CP/SOB  Objective: Sleeping in bed  NAD  I/O       02/14 0701 - 02/15 0700 02/15 0701 - 02/16 0700    P  O  0 0    NG/ 250    IV Piggyback 100     Feedings      Total Intake(mL/kg) 350 (5 5) 250 (3 9)    Urine (mL/kg/hr) 1700 (1 1) 950 (1 1)    Drains 220 (0 1) 265 (0 3)    Total Output 1920 1215    Net -1570 -965          Unmeasured Urine Occurrence 1 x 1 x          Invasive Devices     Peripherally Inserted Central Catheter Line            PICC Line 79/11/66 Right Basilic 25 days          Drain            Closed/Suction Drain Left LLQ Bulb 19 Fr  21 days    Closed/Suction Drain Left LUQ Bulb 19 Fr  21 days    Closed/Suction Drain Right RLQ Bulb 19 Fr  21 days    Closed/Suction Drain Right RUQ Accordion 19 Fr  21 days    Closed/Suction Drain Left Back Bulb 12 Fr  15 days    NG/OG/Enteral Tube Enteral Feeding Tube Right nares 15 days                Physical Exam:  Vitals: Blood pressure 136/74, pulse 101, temperature 98 °F (36 7 °C), temperature source Oral, resp  rate 16, height 5' 4" (1 626 m), weight 64 kg (141 lb 1 5 oz), SpO2 94 %, not currently breastfeeding  ,Body mass index is 24 22 kg/m²        General appearance: arousable to voice, appears stated age, cooperative and no distress  Head: Normocephalic, without obvious abnormality, incision CDI  Eyes: EOMI  Neck: supple, symmetrical, trachea midline and NT  Chest: incision CDI with suture  No drainage  Skin edges well aligned  Flat without collection  Lungs: non labored breathing  Heart: mild tachycardia  Neurologic:   Mental status: Alert, oriented, thought content appropriate  Sensory: normal to LT  Motor: moving all extremities with generalized weakness     Lab Results:    Results from last 7 days  Lab Units 02/15/18  0515 02/14/18  0446 02/13/18  0512   WBC Thousand/uL 20 10* 17 73* 19 04*   HEMOGLOBIN g/dL 9 6* 9 6* 9 3*   HEMATOCRIT % 31 1* 30 9* 29 9*   PLATELETS Thousands/uL 477* 514* 521*   NEUTROS PCT % 85* 85* 84*   MONOS PCT % 5 6 6       Results from last 7 days  Lab Units 02/15/18  0515 02/14/18  0446 02/13/18  0512   SODIUM mmol/L 141 145 143   POTASSIUM mmol/L 3 8 3 7 3 6   CHLORIDE mmol/L 102 105 104   CO2 mmol/L 34* 34* 34*   BUN mg/dL 19 18 20   CREATININE mg/dL 0 35* 0 35* 0 42*   CALCIUM mg/dL 9 4 9 7 10 0   GLUCOSE RANDOM mg/dL 170* 152* 43*       Results from last 7 days  Lab Units 02/15/18  0515 02/14/18  0446 02/12/18  0443   MAGNESIUM mg/dL 2 0 2 1 2 2       Results from last 7 days  Lab Units 02/09/18  0453   PHOSPHORUS mg/dL 3 7         No results found for: TROPONINT  ABG:  Lab Results   Component Value Date    PHART 7 484 (H) 01/25/2018    JTX5OCM 33 6 (L) 01/25/2018    PO2ART 165 1 (H) 01/25/2018    [id number] 24 7 01/25/2018    BEART 1 6 01/25/2018    SOURCE Line, Arterial 01/25/2018       Imaging Studies: I have personally reviewed pertinent reports  and I have personally reviewed pertinent films in PACS    EKG, Pathology, and Other Studies: I have personally reviewed pertinent reports        VTE Pharmacologic Prophylaxis: Heparin    VTE Mechanical Prophylaxis: sequential compression device

## 2018-02-16 NOTE — SOCIAL WORK
CM met with pt and pt mom and cm provided snf list to review   Pt mom would like to take daughter home with vna if possible   Cm will follow

## 2018-02-16 NOTE — PROGRESS NOTES
Progress Note - General Surgery   Daya Cueto 36 y o  female MRN: [id number]  Unit/Bed#: [facility] 834-01 Encounter: [id number]    Assessment:  36y o -year-old female with gastric perforation s/p hiatal hernia repair at outside hospital, s/p esophageal stent placement, exploratory laparotomy and washout  Has had persistent esophageal/gastric leak on imaging  Had bilateral pleural effusions which were drained  Perisplenic fluid collection was drained          Plan:  - continue tube feeds at goal  - monitor subcutaneous emphysema  - unasyn/diflucan per ID until 2/19  - OOB/ambulate  - PT/OT  - SQH/SCDs         Subjective/Objective   Chief Complaint:     Subjective: Yesterday evening did have a small amount of clear emesis  Tube feeds were held for approximately 2 hours and then restarted  Patient felt less nauseated and has been tolerating them fine  Having BM  2 drains pulled yesterday  Objective:     Blood pressure 142/78, pulse 100, temperature 98 8 °F (37 1 °C), temperature source Oral, resp  rate 18, height 5' 4" (1 626 m), weight 65 9 kg (145 lb 4 5 oz), SpO2 97 %, not currently breastfeeding  ,Body mass index is 24 94 kg/m²  Intake/Output Summary (Last 24 hours) at 02/16/18 8490  Last data filed at 02/16/18 0501   Gross per 24 hour   Intake              250 ml   Output             1735 ml   Net            -1485 ml       Invasive Devices     Peripherally Inserted Central Catheter Line            PICC Line 21/29/18 Right Basilic 26 days          Drain            Closed/Suction Drain Left LLQ Bulb 19 Fr  21 days    Closed/Suction Drain Left LUQ Bulb 19 Fr  21 days    Closed/Suction Drain Left Back Bulb 12 Fr   15 days    NG/OG/Enteral Tube Enteral Feeding Tube Right nares 15 days                Physical Exam:   NAD  RRR  Norm resp effort  Abd soft, distended, NT, ADENIKE x2 serous, R ADENIKE sites w dressings cdi, stable subQ air  Perisplenic drain (LUQ) serous  TF's at 59 and tolerating      Lab, Imaging and other studies:  I have personally reviewed pertinent lab results    , CBC:   No results found for: WBC, HGB, HCT, MCV, PLT, ADJUSTEDWBC, MCH, MCHC, RDW, MPV, NRBC, CMP:   No results found for: NA, K, CL, CO2, ANIONGAP, BUN, CREATININE, GLUCOSE, CALCIUM, AST, ALT, ALKPHOS, PROT, ALBUMIN, BILITOT, EGFR  VTE Pharmacologic Prophylaxis: Sequential compression device (Venodyne)   VTE Mechanical Prophylaxis: sequential compression device

## 2018-02-16 NOTE — PROGRESS NOTES
Patient vomited small amount of clear liquid  Patient requesting TF be put on hold  [facility] surgery resident on floor  Said ok to hold TF temporarily  Will restart around 10 pm  Keofed flushed with water and Zofran given for nausea

## 2018-02-16 NOTE — PROGRESS NOTES
General Surgery Update    Images reviewed with radiology  Drains are as follows:    RUQ: draining left pelvis  RLQ: draining buzz-hepatic  LUQ: draining right pelvis  LLQ: draining buzz-splenic  Left flank (IR): draining buzz-splenic    The gastro/esophageal perforation is not being drained but patient remains hemodynamically normal     Her RUQ and RLQ drains are not draining any collections and output has been <30mL serosanguinous each  They are, however, traversing the subcutaneous emphysema on the right side of her abdomen  Our team is under the impression that these right-sided drains may be responsible for providing an avenue for air to escape from her perforation and then settle in the subcutaneous tissue  RUQ and RLQ drains removed  Each drain site was dressed with gauze and tape over three sides to allow for coverage but also an escape for air within the subcutaneous tissue  Will continue to follow closely      Escobar Charles MD PGY-4  1:39 AM  02/16/18

## 2018-02-16 NOTE — POST OP PROGRESS NOTES
Progress Note - Neurosurgery   Isidro García 36 y o  female MRN: [id number]  Unit/Bed#: [facility] 834-01 Encounter: [id number]    Assessment:  1  POD11 removal of VPS  2  Incisional drainage - resolved  3  History of ventriculoperitoneal shunt for idiopathic intracranial hypertension (originally placed May 30, 2017)  4  Left upper extremity occlusive thrombophlebitis cephalic vein  5  Peritonitis   6  Gastric perforation status post repair    Plan:  50-year-old female transfer [facility] for esophageal stent status post upper gastric injury during laparoscopic paraesophageal hernia repair  Patient developed sepsis and required multiple operative procedures  Upon transfer to [facility], she underwent a esophageal stent and externalization ventriculoperitoneal shunt which has since been completed removed  · Exam reveals with diffuse weakness throughout  Voice soft but appropriate  Interactive  Following commands  No focal findings  · Scalp Incision CDI without erythema edema or drainage  · Right chest incision clean dry intact following sutures placed 2/13/18  Dressing dry  No erythema edema or drainage  No subcutaneous collections noted  Incision is flat  Incision left PIERRE  · Pain control per primary team   · Ongoing medical management per primary team   Drain management  Trend white count and temperature profile  · Infectious disease following - continue on IV Unasyn and fluconazole trough 2/19/18  · Mobilized out of bed  Encouraged mobilization out of bed  PT/OT - recommending rehab  · DVT PPX:  Heparin and SCDs on during exam   · No further neurosurgical intervention anticipated at this juncture  · Repeat CT head 2/15/18 for baseline following shunt removal  No acute intracranial abnormalities  S/p shunt removal  Ventricles increase from prior study 1/25/18  Prior slit like and now slightly larger and appropriate for age     · Patient may require lumbar puncture for opening and closing pressure if she develops any collections, further incisional drainage, headaches or vision changes  · Consider neurology evaluation for consideration of medical treatment for pseudotumor cerebri since shunt has been removed  · Will see as needed during remainder of hospitalization  Sign-off  · Call if any questions or concerns including headaches, vision changes or incisional drainage  Subjective/Objective   Chief Complaint: I'm okay/postoperative follow-up    Subjective:  Patient complaining of fatigue and abdominal discomfort  Notes some improvement in strength overall with mobility with therapy  Denies headache or vision disturbance  Denies drainage from incisions  No chest pain or shortness of breath  No nausea or vomiting  Objective:  Lying in bed  NAD  I/O       02/14 0701 - 02/15 0700 02/15 0701 - 02/16 0700 02/16 0701 - 02/17 0700    P  O  0 100     NG/ 250     IV Piggyback 100      Feedings       Total Intake(mL/kg) 350 (5 5) 350 (5 3)     Urine (mL/kg/hr) 1700 (1 1) 1295 (0 8) 275 (0 4)    Drains 220 (0 1) 440 (0 3) 20 (0)    Total Output 1920 1735 295    Net -1570 -1385 -295           Unmeasured Urine Occurrence 1 x 2 x           Invasive Devices     Peripherally Inserted Central Catheter Line            PICC Line 86/45/89 Right Basilic 26 days          Drain            Closed/Suction Drain Left LLQ Bulb 19 Fr  21 days    Closed/Suction Drain Left LUQ Bulb 19 Fr  21 days    Closed/Suction Drain Left Back Bulb 12 Fr  16 days    NG/OG/Enteral Tube Enteral Feeding Tube Right nares 16 days                Physical Exam:  Vitals: Blood pressure 134/72, pulse 98, temperature 98 9 °F (37 2 °C), temperature source Oral, resp  rate 18, height 5' 4" (1 626 m), weight 65 9 kg (145 lb 4 5 oz), SpO2 99 %, not currently breastfeeding  ,Body mass index is 24 94 kg/m²      General appearance: alert, appears stated age, cooperative and no distress  Head: Normocephalic, without obvious abnormality, incision clean dry and intact  Eyes: EOMI, PERRL  Neck: supple, symmetrical, trachea midline   Chest:  Right chest wall incision clean dry intact  Flat  No fluid expressed with applied pressure  Lungs: non labored breathing  Heart: regular heart rate  Neurologic:   Mental status: Alert, oriented, thought content appropriate  Cranial nerves: grossly intact (Cranial nerves II-XII)  Sensory: normal to LT  Motor: moving all extremities with diffuse weakness  Coordination: finger to nose normal bilaterally, no drift bilaterally    Lab Results:    Results from last 7 days  Lab Units 02/16/18  0537 02/15/18  0515 02/14/18  0446   WBC Thousand/uL 21 81* 20 10* 17 73*   HEMOGLOBIN g/dL 9 2* 9 6* 9 6*   HEMATOCRIT % 29 5* 31 1* 30 9*   PLATELETS Thousands/uL 462* 477* 514*   NEUTROS PCT % 87* 85* 85*   MONOS PCT % 5 5 6       Results from last 7 days  Lab Units 02/16/18  0537 02/15/18  0515 02/14/18  0446   SODIUM mmol/L 140 141 145   POTASSIUM mmol/L 3 6 3 8 3 7   CHLORIDE mmol/L 101 102 105   CO2 mmol/L 35* 34* 34*   BUN mg/dL 19 19 18   CREATININE mg/dL 0 37* 0 35* 0 35*   CALCIUM mg/dL 9 4 9 4 9 7   GLUCOSE RANDOM mg/dL 173* 170* 152*       Results from last 7 days  Lab Units 02/15/18  0515 02/14/18  0446 02/12/18  0443   MAGNESIUM mg/dL 2 0 2 1 2 2             No results found for: TROPONINT  ABG:  Lab Results   Component Value Date    PHART 7 484 (H) 01/25/2018    RXP9KCB 33 6 (L) 01/25/2018    PO2ART 165 1 (H) 01/25/2018    CHO3SAV 24 7 01/25/2018    BEART 1 6 01/25/2018    SOURCE Line, Arterial 01/25/2018       Imaging Studies: I have personally reviewed pertinent reports  and I have personally reviewed pertinent films in PACS    EKG, Pathology, and Other Studies: I have personally reviewed pertinent reports        VTE Pharmacologic Prophylaxis: Heparin    VTE Mechanical Prophylaxis: sequential compression device

## 2018-02-17 LAB
ANION GAP SERPL CALCULATED.3IONS-SCNC: 6 MMOL/L (ref 4–13)
BUN SERPL-MCNC: 16 MG/DL (ref 5–25)
CALCIUM SERPL-MCNC: 9.7 MG/DL (ref 8.3–10.1)
CHLORIDE SERPL-SCNC: 102 MMOL/L (ref 100–108)
CO2 SERPL-SCNC: 34 MMOL/L (ref 21–32)
CREAT SERPL-MCNC: 0.33 MG/DL (ref 0.6–1.3)
ERYTHROCYTE [DISTWIDTH] IN BLOOD BY AUTOMATED COUNT: 16.9 % (ref 11.6–15.1)
GFR SERPL CREATININE-BSD FRML MDRD: 136 ML/MIN/1.73SQ M
GLUCOSE SERPL-MCNC: 146 MG/DL (ref 65–140)
GLUCOSE SERPL-MCNC: 158 MG/DL (ref 65–140)
GLUCOSE SERPL-MCNC: 160 MG/DL (ref 65–140)
GLUCOSE SERPL-MCNC: 162 MG/DL (ref 65–140)
GLUCOSE SERPL-MCNC: 172 MG/DL (ref 65–140)
HCT VFR BLD AUTO: 29.5 % (ref 34.8–46.1)
HGB BLD-MCNC: 9 G/DL (ref 11.5–15.4)
MCH RBC QN AUTO: 29 PG (ref 26.8–34.3)
MCHC RBC AUTO-ENTMCNC: 30.5 G/DL (ref 31.4–37.4)
MCV RBC AUTO: 95 FL (ref 82–98)
PLATELET # BLD AUTO: 502 THOUSANDS/UL (ref 149–390)
PMV BLD AUTO: 9.7 FL (ref 8.9–12.7)
POTASSIUM SERPL-SCNC: 3.6 MMOL/L (ref 3.5–5.3)
RBC # BLD AUTO: 3.1 MILLION/UL (ref 3.81–5.12)
SODIUM SERPL-SCNC: 142 MMOL/L (ref 136–145)
WBC # BLD AUTO: 15.34 THOUSAND/UL (ref 4.31–10.16)

## 2018-02-17 PROCEDURE — 94762 N-INVAS EAR/PLS OXIMTRY CONT: CPT

## 2018-02-17 PROCEDURE — 99233 SBSQ HOSP IP/OBS HIGH 50: CPT | Performed by: INTERNAL MEDICINE

## 2018-02-17 PROCEDURE — 80048 BASIC METABOLIC PNL TOTAL CA: CPT | Performed by: PHYSICIAN ASSISTANT

## 2018-02-17 PROCEDURE — 85027 COMPLETE CBC AUTOMATED: CPT | Performed by: PHYSICIAN ASSISTANT

## 2018-02-17 PROCEDURE — C9113 INJ PANTOPRAZOLE SODIUM, VIA: HCPCS | Performed by: NURSE PRACTITIONER

## 2018-02-17 PROCEDURE — 82948 REAGENT STRIP/BLOOD GLUCOSE: CPT

## 2018-02-17 RX ORDER — POTASSIUM CHLORIDE 29.8 MG/ML
40 INJECTION INTRAVENOUS ONCE
Status: COMPLETED | OUTPATIENT
Start: 2018-02-17 | End: 2018-02-18

## 2018-02-17 RX ADMIN — HYDROMORPHONE HYDROCHLORIDE 1 MG: 1 INJECTION, SOLUTION INTRAMUSCULAR; INTRAVENOUS; SUBCUTANEOUS at 21:04

## 2018-02-17 RX ADMIN — HEPARIN SODIUM 5000 UNITS: 5000 INJECTION, SOLUTION INTRAVENOUS; SUBCUTANEOUS at 21:07

## 2018-02-17 RX ADMIN — INSULIN LISPRO 1 UNITS: 100 INJECTION, SOLUTION INTRAVENOUS; SUBCUTANEOUS at 18:05

## 2018-02-17 RX ADMIN — HYDROMORPHONE HYDROCHLORIDE 0.5 MG: 1 INJECTION, SOLUTION INTRAMUSCULAR; INTRAVENOUS; SUBCUTANEOUS at 17:28

## 2018-02-17 RX ADMIN — METOPROLOL TARTRATE 10 MG: 1 INJECTION, SOLUTION INTRAVENOUS at 05:17

## 2018-02-17 RX ADMIN — Medication 3 G: at 05:06

## 2018-02-17 RX ADMIN — HYDROMORPHONE HYDROCHLORIDE 1 MG: 1 INJECTION, SOLUTION INTRAMUSCULAR; INTRAVENOUS; SUBCUTANEOUS at 15:07

## 2018-02-17 RX ADMIN — PANTOPRAZOLE SODIUM 40 MG: 40 INJECTION, POWDER, FOR SOLUTION INTRAVENOUS at 08:17

## 2018-02-17 RX ADMIN — HEPARIN SODIUM 5000 UNITS: 5000 INJECTION, SOLUTION INTRAVENOUS; SUBCUTANEOUS at 08:17

## 2018-02-17 RX ADMIN — METOPROLOL TARTRATE 10 MG: 1 INJECTION, SOLUTION INTRAVENOUS at 12:54

## 2018-02-17 RX ADMIN — Medication 3 G: at 16:55

## 2018-02-17 RX ADMIN — HYDROMORPHONE HYDROCHLORIDE 1 MG: 1 INJECTION, SOLUTION INTRAMUSCULAR; INTRAVENOUS; SUBCUTANEOUS at 01:12

## 2018-02-17 RX ADMIN — HYDROMORPHONE HYDROCHLORIDE 1 MG: 1 INJECTION, SOLUTION INTRAMUSCULAR; INTRAVENOUS; SUBCUTANEOUS at 10:14

## 2018-02-17 RX ADMIN — INSULIN LISPRO 1 UNITS: 100 INJECTION, SOLUTION INTRAVENOUS; SUBCUTANEOUS at 12:54

## 2018-02-17 RX ADMIN — HYDROMORPHONE HYDROCHLORIDE 0.5 MG: 1 INJECTION, SOLUTION INTRAMUSCULAR; INTRAVENOUS; SUBCUTANEOUS at 05:16

## 2018-02-17 RX ADMIN — FLUCONAZOLE 400 MG: 2 INJECTION INTRAVENOUS at 17:28

## 2018-02-17 RX ADMIN — ONDANSETRON 4 MG: 2 INJECTION INTRAMUSCULAR; INTRAVENOUS at 19:59

## 2018-02-17 RX ADMIN — METOPROLOL TARTRATE 10 MG: 1 INJECTION, SOLUTION INTRAVENOUS at 17:23

## 2018-02-17 RX ADMIN — INSULIN LISPRO 1 UNITS: 100 INJECTION, SOLUTION INTRAVENOUS; SUBCUTANEOUS at 05:26

## 2018-02-17 RX ADMIN — Medication 3 G: at 10:10

## 2018-02-17 RX ADMIN — POTASSIUM CHLORIDE 40 MEQ: 400 INJECTION, SOLUTION INTRAVENOUS at 17:32

## 2018-02-17 RX ADMIN — Medication 3 G: at 22:33

## 2018-02-17 NOTE — BRIEF OP NOTE (RAD/CATH)
Image guided intra-abdominal drain placement Procedure Note    PATIENT NAME: Rosaura Sinclair  : 1975  MRN: [id number]     Pre-op Diagnosis:   1  Gastric leak    2   (ventriculoperitoneal) shunt status    3  Peritonitis ([address] )    4  Acute peritonitis ([address] )    5  S/P  shunt    6  Idiopathic intracranial hypertension    7  Mild single current episode of major depressive disorder (HCC)      Post-op Diagnosis:   1  Gastric leak    2   (ventriculoperitoneal) shunt status    3  Peritonitis ([address] )    4  Acute peritonitis ([address] )    5  S/P  shunt    6  Idiopathic intracranial hypertension    7  Mild single current episode of major depressive disorder [facility])        Surgeon:   Meng Masterson MD  Assistants:     No qualified resident was available, Resident is only observing    Estimated Blood Loss:  1 mL  Findings:   Large amount of intra-abdominal gas  The the fluid within the air pocket adjacent to the esophagus has largely resolved  Successful placement of 10 Setswana all-purpose drainage catheter immediately adjacent to the esophagus, in the region of the expected leak and within layering fluid  This pocket was completely collapsed, with 120 cc of air, and approximately 20 cc of yellow bilious fluid  Patient tolerated the procedure well  As discussed with Dr Alexa Zepeda prior to the procedure, the drain was left to a gravity bag  No suction is to be applied to this drain per Dr Castillo Amado      Specimens:  None requested    Complications:  Nothing immediately apparent    Anesthesia: Conscious sedation and Local    Meng Masterson MD     Date: 2018  Time: 9:55 PM

## 2018-02-17 NOTE — PROGRESS NOTES
Vascular and Interventional Radiology Pre Procedure Note    Diagnosis:  Esophageal leak, increasing abdominal fluid and air    Procedure:  Image guided drainage placement    HPI:  76-year-old female complex medical course and hospitalization, beginning with perforation during repair of paraesophageal hernia  Patient then underwent placement of a soft joules stent  Unfortunately, the patient has had persistent leak adjacent to the esophagus with increasing fluid and intra-abdominal gas  This week, the intra-abdominal gas has penetrated into the subcutaneous tissue where there is extensive subcutaneous emphysema  There is concerned the part of General surgery that the patient is developing necrotizing fasciitis of the abdomen  Drainage placement has been requested to allow for removal of the fluid and gas and hopefully prevent extensive necrotizing event of the abdominal wall and abdomen  Patient continues to have abdominal pain  She feels increased bloating and expansion of her skin  Exam:  General:  Awake, alert, oriented x3, no acute distress  Neck:  Soft, supple, nontender  Chest:  Nontender, no deformity, right chest incision sutured without erythema or drainage  Abdomen:  Soft, mildly tender all 4 quadrants, no guarding or rebound, crepitus of the abdominal wall  Cardiac:  S1-S2, regular rate and rhythm  Lungs:  Bilateral air entry, nonlabored breathing      Labs:  Hematology:  WBC 21 81, hemoglobin 9 2, hematocrit 29 5, platelets 378  Chemistry:  Sodium 140, potassium 3 6, chloride 101, carbon dioxide 35, BUN 19, creatinine 0 37, glucose 173  Coagulation:  INR 1 26, prothrombin time 15 9, PTT 37    Imaging:  CT scan February 15, 2018 demonstrates large amount of abdominal wall subcutaneous emphysema, communicating with the paraesophageal leak    In addition, the central collection of fluid and air associated with the esophageal leak is increased in size with more fluid compared to the prior exam     Plan:  Proceed with image guided drainage placement within the fluid collection and air adjacent to the esophagus  I had a long discussion with Dr Azeem Dey and Dr Lawson Brady of surgery today  Because of the extensive amount of intra-abdominal gas and free air, it is difficult to adequately delineate the large bowel and small bowel from the free air  There does appear to be a window by CT, however again cannot guarantee that the air does not represent significantly distended small bowel or large bowel  Both Dr Sybil Cornejo and Gokul Sheppard state that it is more important to begin draining the air and fluid to prevent extensive necrotizing fasciitis and that the risk of perforating bowel or placing a drain through bowel is significantly outweighed by the benefits of placement of a drain and the therapy that would provide  The hope is that the drain will allow the leak to close without additional surgical intervention  However, if in 6 weeks the leak persists, surgery will likely be indicated  I discussed the procedure, its details, risks, benefits and alternatives with the patient  I was meena in my discussion about the possibility of perforating bowel, which could require discharge procedure such as resection  All questions were answered  Patient elects to proceed with the procedure  H&P was reviewed

## 2018-02-17 NOTE — PROGRESS NOTES
Progress Note - General Surgery   Joshua Serrato 36 y o  female MRN: [id number]  Unit/Bed#: [facility] 834-01 Encounter: [id number]    Assessment:  36y o -year-old female with gastric perforation s/p hiatal hernia repair at outside hospital, s/p esophageal stent placement, exploratory laparotomy and washout  Has had persistent esophageal/gastric leak on imaging  Had bilateral pleural effusions which were drained  Perisplenic fluid collection was drained  - IR drain placed near GE leak yesterday  - WBC 15 today from 21 8    Plan:  Continue NPO w/ TF  Monitor drain outputs  Trend WBC- improved s/p drainage  PRN pain control  Continue antibiotics per ID- unasyn    Subjective/Objective   Chief Complaint: None    Subjective: Has some pain at new IR insertion site  Otherwise no complaints  Passing a little flatus, tolerating TF at goal    Objective:     Blood pressure 132/77, pulse 85, temperature 98 4 °F (36 9 °C), temperature source Oral, resp  rate 18, height 5' 4" (1 626 m), weight 65 9 kg (145 lb 4 5 oz), SpO2 100 %, not currently breastfeeding  ,Body mass index is 24 94 kg/m²  Intake/Output Summary (Last 24 hours) at 02/17/18 0717  Last data filed at 02/17/18 0521   Gross per 24 hour   Intake                0 ml   Output              940 ml   Net             -940 ml       Invasive Devices     Peripherally Inserted Central Catheter Line            PICC Line 65/31/19 Right Basilic 27 days          Drain            Closed/Suction Drain Left LLQ Bulb 19 Fr  22 days    Closed/Suction Drain Left LUQ Bulb 19 Fr  22 days    Closed/Suction Drain Left Back Bulb 12 Fr  16 days    NG/OG/Enteral Tube Enteral Feeding Tube Right nares 16 days    Closed/Suction Drain Midline;Superior Abdomen Other (Comment) 10 Fr  less than 1 day                Physical Exam:   Gen: A&O, NAD  Cardio: RRR  Lungs: CTAB  Abd: Soft, non distended, minimally tender, brendan at new IR drain insertion site   IR drain to epigastrum, minimal dark output in tubing, none in bag   Drains to left abdomen w/ greenish dark output      Lab, Imaging and other studies:  CBC:   Lab Results   Component Value Date    WBC 15 34 (H) 02/17/2018    HGB 9 0 (L) 02/17/2018    HCT 29 5 (L) 02/17/2018    MCV 95 02/17/2018     (H) 02/17/2018    MCH 29 0 02/17/2018    MCHC 30 5 (L) 02/17/2018    RDW 16 9 (H) 02/17/2018    MPV 9 7 02/17/2018   , CMP:   Lab Results   Component Value Date     02/17/2018    K 3 6 02/17/2018     02/17/2018    CO2 34 (H) 02/17/2018    ANIONGAP 6 02/17/2018    BUN 16 02/17/2018    CREATININE 0 33 (L) 02/17/2018    GLUCOSE 162 (H) 02/17/2018    CALCIUM 9 7 02/17/2018    EGFR 136 02/17/2018     VTE Pharmacologic Prophylaxis: Heparin  VTE Mechanical Prophylaxis: sequential compression device

## 2018-02-18 LAB
ANION GAP SERPL CALCULATED.3IONS-SCNC: 6 MMOL/L (ref 4–13)
BASOPHILS # BLD AUTO: 0.03 THOUSANDS/ΜL (ref 0–0.1)
BASOPHILS NFR BLD AUTO: 0 % (ref 0–1)
BUN SERPL-MCNC: 16 MG/DL (ref 5–25)
CALCIUM SERPL-MCNC: 9.3 MG/DL (ref 8.3–10.1)
CHLORIDE SERPL-SCNC: 103 MMOL/L (ref 100–108)
CO2 SERPL-SCNC: 34 MMOL/L (ref 21–32)
CREAT SERPL-MCNC: 0.34 MG/DL (ref 0.6–1.3)
EOSINOPHIL # BLD AUTO: 0.6 THOUSAND/ΜL (ref 0–0.61)
EOSINOPHIL NFR BLD AUTO: 4 % (ref 0–6)
ERYTHROCYTE [DISTWIDTH] IN BLOOD BY AUTOMATED COUNT: 16.6 % (ref 11.6–15.1)
GFR SERPL CREATININE-BSD FRML MDRD: 135 ML/MIN/1.73SQ M
GLUCOSE SERPL-MCNC: 152 MG/DL (ref 65–140)
GLUCOSE SERPL-MCNC: 158 MG/DL (ref 65–140)
GLUCOSE SERPL-MCNC: 193 MG/DL (ref 65–140)
GLUCOSE SERPL-MCNC: 195 MG/DL (ref 65–140)
GLUCOSE SERPL-MCNC: 204 MG/DL (ref 65–140)
GLUCOSE SERPL-MCNC: 204 MG/DL (ref 65–140)
HCT VFR BLD AUTO: 30.6 % (ref 34.8–46.1)
HGB BLD-MCNC: 9.5 G/DL (ref 11.5–15.4)
LYMPHOCYTES # BLD AUTO: 1.17 THOUSANDS/ΜL (ref 0.6–4.47)
LYMPHOCYTES NFR BLD AUTO: 7 % (ref 14–44)
MAGNESIUM SERPL-MCNC: 2.1 MG/DL (ref 1.6–2.6)
MCH RBC QN AUTO: 29.4 PG (ref 26.8–34.3)
MCHC RBC AUTO-ENTMCNC: 31 G/DL (ref 31.4–37.4)
MCV RBC AUTO: 95 FL (ref 82–98)
MONOCYTES # BLD AUTO: 1 THOUSAND/ΜL (ref 0.17–1.22)
MONOCYTES NFR BLD AUTO: 6 % (ref 4–12)
NEUTROPHILS # BLD AUTO: 13.14 THOUSANDS/ΜL (ref 1.85–7.62)
NEUTS SEG NFR BLD AUTO: 83 % (ref 43–75)
NRBC BLD AUTO-RTO: 0 /100 WBCS
PLATELET # BLD AUTO: 527 THOUSANDS/UL (ref 149–390)
PMV BLD AUTO: 9.5 FL (ref 8.9–12.7)
POTASSIUM SERPL-SCNC: 3.8 MMOL/L (ref 3.5–5.3)
RBC # BLD AUTO: 3.23 MILLION/UL (ref 3.81–5.12)
SODIUM SERPL-SCNC: 143 MMOL/L (ref 136–145)
WBC # BLD AUTO: 16.04 THOUSAND/UL (ref 4.31–10.16)

## 2018-02-18 PROCEDURE — 82948 REAGENT STRIP/BLOOD GLUCOSE: CPT

## 2018-02-18 PROCEDURE — 83735 ASSAY OF MAGNESIUM: CPT | Performed by: SURGERY

## 2018-02-18 PROCEDURE — 80048 BASIC METABOLIC PNL TOTAL CA: CPT | Performed by: SURGERY

## 2018-02-18 PROCEDURE — 94762 N-INVAS EAR/PLS OXIMTRY CONT: CPT

## 2018-02-18 PROCEDURE — 97530 THERAPEUTIC ACTIVITIES: CPT

## 2018-02-18 PROCEDURE — 99233 SBSQ HOSP IP/OBS HIGH 50: CPT | Performed by: INTERNAL MEDICINE

## 2018-02-18 PROCEDURE — C9113 INJ PANTOPRAZOLE SODIUM, VIA: HCPCS | Performed by: NURSE PRACTITIONER

## 2018-02-18 PROCEDURE — 85025 COMPLETE CBC W/AUTO DIFF WBC: CPT | Performed by: SURGERY

## 2018-02-18 RX ADMIN — METOPROLOL TARTRATE 10 MG: 1 INJECTION, SOLUTION INTRAVENOUS at 12:18

## 2018-02-18 RX ADMIN — PANTOPRAZOLE SODIUM 40 MG: 40 INJECTION, POWDER, FOR SOLUTION INTRAVENOUS at 08:06

## 2018-02-18 RX ADMIN — INSULIN LISPRO 1 UNITS: 100 INJECTION, SOLUTION INTRAVENOUS; SUBCUTANEOUS at 05:20

## 2018-02-18 RX ADMIN — INSULIN LISPRO 1 UNITS: 100 INJECTION, SOLUTION INTRAVENOUS; SUBCUTANEOUS at 17:34

## 2018-02-18 RX ADMIN — HYDROMORPHONE HYDROCHLORIDE 1 MG: 1 INJECTION, SOLUTION INTRAMUSCULAR; INTRAVENOUS; SUBCUTANEOUS at 00:30

## 2018-02-18 RX ADMIN — METOPROLOL TARTRATE 10 MG: 1 INJECTION, SOLUTION INTRAVENOUS at 05:22

## 2018-02-18 RX ADMIN — HYDROMORPHONE HYDROCHLORIDE 1 MG: 1 INJECTION, SOLUTION INTRAMUSCULAR; INTRAVENOUS; SUBCUTANEOUS at 16:13

## 2018-02-18 RX ADMIN — HEPARIN SODIUM 5000 UNITS: 5000 INJECTION, SOLUTION INTRAVENOUS; SUBCUTANEOUS at 08:06

## 2018-02-18 RX ADMIN — HEPARIN SODIUM 5000 UNITS: 5000 INJECTION, SOLUTION INTRAVENOUS; SUBCUTANEOUS at 21:21

## 2018-02-18 RX ADMIN — Medication 3 G: at 16:11

## 2018-02-18 RX ADMIN — HYDROMORPHONE HYDROCHLORIDE 0.5 MG: 1 INJECTION, SOLUTION INTRAMUSCULAR; INTRAVENOUS; SUBCUTANEOUS at 05:29

## 2018-02-18 RX ADMIN — Medication 3 G: at 21:21

## 2018-02-18 RX ADMIN — HYDROMORPHONE HYDROCHLORIDE 1 MG: 1 INJECTION, SOLUTION INTRAMUSCULAR; INTRAVENOUS; SUBCUTANEOUS at 21:30

## 2018-02-18 RX ADMIN — METOPROLOL TARTRATE 10 MG: 1 INJECTION, SOLUTION INTRAVENOUS at 17:27

## 2018-02-18 RX ADMIN — HYDROMORPHONE HYDROCHLORIDE 1 MG: 1 INJECTION, SOLUTION INTRAMUSCULAR; INTRAVENOUS; SUBCUTANEOUS at 08:07

## 2018-02-18 RX ADMIN — HYDROMORPHONE HYDROCHLORIDE 1 MG: 1 INJECTION, SOLUTION INTRAMUSCULAR; INTRAVENOUS; SUBCUTANEOUS at 12:19

## 2018-02-18 RX ADMIN — HYDROMORPHONE HYDROCHLORIDE 0.5 MG: 1 INJECTION, SOLUTION INTRAMUSCULAR; INTRAVENOUS; SUBCUTANEOUS at 14:46

## 2018-02-18 RX ADMIN — Medication 3 G: at 09:59

## 2018-02-18 RX ADMIN — INSULIN LISPRO 1 UNITS: 100 INJECTION, SOLUTION INTRAVENOUS; SUBCUTANEOUS at 12:19

## 2018-02-18 RX ADMIN — FLUCONAZOLE 400 MG: 2 INJECTION INTRAVENOUS at 17:27

## 2018-02-18 RX ADMIN — Medication 3 G: at 04:31

## 2018-02-18 RX ADMIN — HYDROMORPHONE HYDROCHLORIDE 0.5 MG: 1 INJECTION, SOLUTION INTRAMUSCULAR; INTRAVENOUS; SUBCUTANEOUS at 19:47

## 2018-02-18 RX ADMIN — INSULIN LISPRO 1 UNITS: 100 INJECTION, SOLUTION INTRAVENOUS; SUBCUTANEOUS at 00:32

## 2018-02-18 RX ADMIN — METOPROLOL TARTRATE 10 MG: 1 INJECTION, SOLUTION INTRAVENOUS at 00:22

## 2018-02-18 NOTE — PHYSICAL THERAPY NOTE
Physical Therapy Progress Note     02/18/18 1445   Pain Assessment   Pain Assessment 0-10   Pain Score 8   Pain Type Acute pain   Pain Location Abdomen   Pain Orientation Bilateral   Hospital Pain Intervention(s) Repositioned   Response to Interventions tolerated    Restrictions/Precautions   Weight Bearing Precautions Per Order No   Other Precautions Multiple lines;Pain; Fall Risk   General   Chart Reviewed Yes   Response to Previous Treatment Patient with no complaints from previous session  Family/Caregiver Present Yes   Cognition   Arousal/Participation Alert; Cooperative   Orientation Level Oriented X4   Following Commands Follows one step commands without difficulty   Subjective   Subjective has  abd pain   willin gto paerticipate  and get pain meds  after tx   Bed Mobility   Supine to Sit 3  Moderate assistance   Additional items Assist x 2;HOB elevated; Bedrails;Leg ;Verbal cues;LE management   Transfers   Sit to Stand 4  Minimal assistance   Additional items Assist x 2; Increased time required;Verbal cues   Stand to Sit 4  Minimal assistance   Additional items Assist x 2; Increased time required;Verbal cues   Stand pivot 3  Moderate assistance   Additional items Assist x 1; Increased time required;Verbal cues   Balance   Static Sitting Fair   Static Standing Fair -   Dynamic Standing Poor +   Endurance Deficit   Endurance Deficit Yes   Endurance Deficit Description pain and  fatgiue    Activity Tolerance   Activity Tolerance Patient limited by fatigue;Patient limited by pain   Nurse [facility] [age] rn    Exercises   THR Supine;20 reps;AROM; Bilateral   Assessment   Prognosis Good   Problem List Decreased strength;Decreased endurance; Impaired balance;Decreased mobility;Pain   Assessment pt   is  able to complete bed  mob  c mod A x2 and  assist  from mechanics of   bed   she did  sit on the EOB  unsupported     pt   did  step over to  chr  c mod A x2  pt   moves  very  cautiously a nd  is  ffearful of falling  instruction and encouragement  needed   through all phases  of   treatment session   Tara Harkins pt  was left in  chr  and instructed  not to recline for  at least  30 min   to  increase  sitting endurance      rn aware    Barriers to Discharge Decreased caregiver support; Inaccessible home environment   Goals   Patient Goals none stated   STG Expiration Date 02/21/18   Treatment Day 6   Plan   Treatment/Interventions Functional transfer training;LE strengthening/ROM; Patient/family training;Bed mobility;Spoke to nursing   Progress Progressing toward goals   PT Frequency 5x/wk   Recommendation   Recommendation Post acute IP rehab   Equipment Recommended Radha Quinn; Wheelchair   PT - OK to Discharge Yes  (to rehab  when med ready )     Max Young, PTA

## 2018-02-18 NOTE — PROGRESS NOTES
Patient called nursing staff into room to ask if she can add her Ezio-aid flavoring drops to the ice chips she has to moisten her mouth with  Red sx notified  As per Landen Max MD, this is okay as long as she is just moistening her mouth occasionally and not drinking it  No further orders  Will continue to monitor

## 2018-02-18 NOTE — PLAN OF CARE
Problem: PHYSICAL THERAPY ADULT  Goal: Performs mobility at highest level of function for planned discharge setting  See evaluation for individualized goals  Treatment/Interventions: Functional transfer training, LE strengthening/ROM, Therapeutic exercise, Endurance training, Bed mobility, Spoke to nursing  Equipment Recommended:  (R/O LEAST RESTRICTIVE AD )       See flowsheet documentation for full assessment, interventions and recommendations  Outcome: Progressing  Prognosis: Good  Problem List: Decreased strength, Decreased endurance, Impaired balance, Decreased mobility, Pain  Assessment: pt   is  able to complete bed  mob  c mod A x2 and  assist  from mechanics of   bed   she did  sit on the EOB  unsupported     pt   did  step over to  chr  c mod A x2  pt   moves  very  cautiously a nd  is  ffearful of falling  instruction and encouragement  needed   through all phases  of   treatment session   [place] pt  was left in  chr  and instructed  not to recline for  at least  30 min   to  increase  sitting endurance      rn aware   Barriers to Discharge: Decreased caregiver support, Inaccessible home environment     Recommendation: Post acute IP rehab     PT - OK to Discharge: Yes (to rehab  when med ready )    See flowsheet documentation for full assessment

## 2018-02-18 NOTE — RESTORATIVE TECHNICIAN NOTE
Restorative Specialist Mobility Note       Activity: Chair     Assistive Device: Front wheel walker     Ambulation Response: Tolerated fairly well  Repositioned: Sitting, Up in chair     Range of Motion: Active, All extremities    Pt left resting comfortably in bedside recliner, with PT present in room for end of session

## 2018-02-19 LAB
ANION GAP SERPL CALCULATED.3IONS-SCNC: 6 MMOL/L (ref 4–13)
BASOPHILS # BLD AUTO: 0.03 THOUSANDS/ΜL (ref 0–0.1)
BASOPHILS NFR BLD AUTO: 0 % (ref 0–1)
BUN SERPL-MCNC: 15 MG/DL (ref 5–25)
CALCIUM SERPL-MCNC: 9.4 MG/DL (ref 8.3–10.1)
CHLORIDE SERPL-SCNC: 101 MMOL/L (ref 100–108)
CO2 SERPL-SCNC: 35 MMOL/L (ref 21–32)
CREAT SERPL-MCNC: 0.32 MG/DL (ref 0.6–1.3)
EOSINOPHIL # BLD AUTO: 0.44 THOUSAND/ΜL (ref 0–0.61)
EOSINOPHIL NFR BLD AUTO: 2 % (ref 0–6)
ERYTHROCYTE [DISTWIDTH] IN BLOOD BY AUTOMATED COUNT: 16.5 % (ref 11.6–15.1)
GFR SERPL CREATININE-BSD FRML MDRD: 138 ML/MIN/1.73SQ M
GLUCOSE SERPL-MCNC: 127 MG/DL (ref 65–140)
GLUCOSE SERPL-MCNC: 174 MG/DL (ref 65–140)
GLUCOSE SERPL-MCNC: 177 MG/DL (ref 65–140)
GLUCOSE SERPL-MCNC: 203 MG/DL (ref 65–140)
GLUCOSE SERPL-MCNC: 212 MG/DL (ref 65–140)
HCT VFR BLD AUTO: 30.1 % (ref 34.8–46.1)
HGB BLD-MCNC: 9.3 G/DL (ref 11.5–15.4)
LYMPHOCYTES # BLD AUTO: 1.28 THOUSANDS/ΜL (ref 0.6–4.47)
LYMPHOCYTES NFR BLD AUTO: 7 % (ref 14–44)
MAGNESIUM SERPL-MCNC: 2.1 MG/DL (ref 1.6–2.6)
MCH RBC QN AUTO: 29.1 PG (ref 26.8–34.3)
MCHC RBC AUTO-ENTMCNC: 30.9 G/DL (ref 31.4–37.4)
MCV RBC AUTO: 94 FL (ref 82–98)
MONOCYTES # BLD AUTO: 1.04 THOUSAND/ΜL (ref 0.17–1.22)
MONOCYTES NFR BLD AUTO: 6 % (ref 4–12)
NEUTROPHILS # BLD AUTO: 15.89 THOUSANDS/ΜL (ref 1.85–7.62)
NEUTS SEG NFR BLD AUTO: 85 % (ref 43–75)
NRBC BLD AUTO-RTO: 0 /100 WBCS
PLATELET # BLD AUTO: 499 THOUSANDS/UL (ref 149–390)
PMV BLD AUTO: 9.6 FL (ref 8.9–12.7)
POTASSIUM SERPL-SCNC: 3.4 MMOL/L (ref 3.5–5.3)
RBC # BLD AUTO: 3.2 MILLION/UL (ref 3.81–5.12)
SODIUM SERPL-SCNC: 142 MMOL/L (ref 136–145)
WBC # BLD AUTO: 18.79 THOUSAND/UL (ref 4.31–10.16)

## 2018-02-19 PROCEDURE — 80048 BASIC METABOLIC PNL TOTAL CA: CPT | Performed by: SURGERY

## 2018-02-19 PROCEDURE — 94760 N-INVAS EAR/PLS OXIMETRY 1: CPT

## 2018-02-19 PROCEDURE — C9113 INJ PANTOPRAZOLE SODIUM, VIA: HCPCS | Performed by: NURSE PRACTITIONER

## 2018-02-19 PROCEDURE — 83735 ASSAY OF MAGNESIUM: CPT | Performed by: SURGERY

## 2018-02-19 PROCEDURE — 99233 SBSQ HOSP IP/OBS HIGH 50: CPT | Performed by: INTERNAL MEDICINE

## 2018-02-19 PROCEDURE — 94762 N-INVAS EAR/PLS OXIMTRY CONT: CPT

## 2018-02-19 PROCEDURE — 82948 REAGENT STRIP/BLOOD GLUCOSE: CPT

## 2018-02-19 PROCEDURE — 99024 POSTOP FOLLOW-UP VISIT: CPT | Performed by: SURGERY

## 2018-02-19 PROCEDURE — 85025 COMPLETE CBC W/AUTO DIFF WBC: CPT | Performed by: SURGERY

## 2018-02-19 RX ADMIN — HYDROMORPHONE HYDROCHLORIDE 1 MG: 1 INJECTION, SOLUTION INTRAMUSCULAR; INTRAVENOUS; SUBCUTANEOUS at 09:50

## 2018-02-19 RX ADMIN — HEPARIN SODIUM 5000 UNITS: 5000 INJECTION, SOLUTION INTRAVENOUS; SUBCUTANEOUS at 21:43

## 2018-02-19 RX ADMIN — METOPROLOL TARTRATE 10 MG: 1 INJECTION, SOLUTION INTRAVENOUS at 17:23

## 2018-02-19 RX ADMIN — HYDROMORPHONE HYDROCHLORIDE 1 MG: 1 INJECTION, SOLUTION INTRAMUSCULAR; INTRAVENOUS; SUBCUTANEOUS at 17:25

## 2018-02-19 RX ADMIN — HYDROMORPHONE HYDROCHLORIDE 1 MG: 1 INJECTION, SOLUTION INTRAMUSCULAR; INTRAVENOUS; SUBCUTANEOUS at 02:14

## 2018-02-19 RX ADMIN — INSULIN LISPRO 2 UNITS: 100 INJECTION, SOLUTION INTRAVENOUS; SUBCUTANEOUS at 17:26

## 2018-02-19 RX ADMIN — HYDROMORPHONE HYDROCHLORIDE 0.5 MG: 1 INJECTION, SOLUTION INTRAMUSCULAR; INTRAVENOUS; SUBCUTANEOUS at 00:04

## 2018-02-19 RX ADMIN — Medication 3 G: at 09:30

## 2018-02-19 RX ADMIN — HYDROMORPHONE HYDROCHLORIDE 1 MG: 1 INJECTION, SOLUTION INTRAMUSCULAR; INTRAVENOUS; SUBCUTANEOUS at 13:19

## 2018-02-19 RX ADMIN — INSULIN LISPRO 1 UNITS: 100 INJECTION, SOLUTION INTRAVENOUS; SUBCUTANEOUS at 05:24

## 2018-02-19 RX ADMIN — HYDROMORPHONE HYDROCHLORIDE 0.5 MG: 1 INJECTION, SOLUTION INTRAMUSCULAR; INTRAVENOUS; SUBCUTANEOUS at 08:11

## 2018-02-19 RX ADMIN — HYDROMORPHONE HYDROCHLORIDE 1 MG: 1 INJECTION, SOLUTION INTRAMUSCULAR; INTRAVENOUS; SUBCUTANEOUS at 21:43

## 2018-02-19 RX ADMIN — INSULIN LISPRO 1 UNITS: 100 INJECTION, SOLUTION INTRAVENOUS; SUBCUTANEOUS at 00:03

## 2018-02-19 RX ADMIN — HYDROMORPHONE HYDROCHLORIDE 0.5 MG: 1 INJECTION, SOLUTION INTRAMUSCULAR; INTRAVENOUS; SUBCUTANEOUS at 16:07

## 2018-02-19 RX ADMIN — METOPROLOL TARTRATE 10 MG: 1 INJECTION, SOLUTION INTRAVENOUS at 23:54

## 2018-02-19 RX ADMIN — Medication 3 G: at 17:18

## 2018-02-19 RX ADMIN — Medication 3 G: at 23:55

## 2018-02-19 RX ADMIN — METOPROLOL TARTRATE 10 MG: 1 INJECTION, SOLUTION INTRAVENOUS at 00:03

## 2018-02-19 RX ADMIN — HYDROMORPHONE HYDROCHLORIDE 1 MG: 1 INJECTION, SOLUTION INTRAMUSCULAR; INTRAVENOUS; SUBCUTANEOUS at 06:44

## 2018-02-19 RX ADMIN — HYDROMORPHONE HYDROCHLORIDE 0.5 MG: 1 INJECTION, SOLUTION INTRAMUSCULAR; INTRAVENOUS; SUBCUTANEOUS at 19:26

## 2018-02-19 RX ADMIN — HEPARIN SODIUM 5000 UNITS: 5000 INJECTION, SOLUTION INTRAVENOUS; SUBCUTANEOUS at 08:08

## 2018-02-19 RX ADMIN — PANTOPRAZOLE SODIUM 40 MG: 40 INJECTION, POWDER, FOR SOLUTION INTRAVENOUS at 08:08

## 2018-02-19 RX ADMIN — METOPROLOL TARTRATE 10 MG: 1 INJECTION, SOLUTION INTRAVENOUS at 11:44

## 2018-02-19 RX ADMIN — FLUCONAZOLE 400 MG: 2 INJECTION INTRAVENOUS at 18:13

## 2018-02-19 RX ADMIN — INSULIN LISPRO 1 UNITS: 100 INJECTION, SOLUTION INTRAVENOUS; SUBCUTANEOUS at 11:50

## 2018-02-19 RX ADMIN — METOPROLOL TARTRATE 10 MG: 1 INJECTION, SOLUTION INTRAVENOUS at 05:02

## 2018-02-19 RX ADMIN — Medication 3 G: at 04:09

## 2018-02-19 NOTE — PROGRESS NOTES
Progress Note - Infectious Disease   Daya Cueto 36 y o  female MRN: [id number]  Unit/Bed#: [facility] 834-01 Encounter: [id number]      Impression/Recommendations:  1   Intra-abdominal/pelvic abscesses   Patient is status post multiple abdominal washouts   She has multiple drains in place   She is status post placement of drainage in a new perisplenic collection   She is clinically well and systemically stable  Operative cultures consistently growing  only ampicillin susceptible Enterococcus   A single culture also grew Saccharomyces, most likely colonization   Patient had a new drain placed in left abdomen on 02/16   Culture this drainage is growing Enterococcus again, with no other pathogens  WBC decreasing again since this last drainage but increased again over last 48 hours    Continue with IV Unasyn/fluconazole  Continue drainage    Treat x at least 7 days from last drainage, at least another 4 days      2   Possible infected  shunt   Given presence of tip of  shunt in peritoneal space, there is high probability of  shunt infection   Fortunately, patient has no symptoms concerning for meningitis   She has neurological deficits  George Score is status post tapping of  shunt at [facility]   CSF culture there had no growth but patient had prior antibiotic  Dc Turpin will go ahead and treat her for possible/presumptive  shunt infection   Patient is now status post  shunt resection   CSF culture here also negative  George Score completed 2 weeks of antibiotic after shunt removal     No further antibiotic needed for this      3   Gastric perforation, status post repair, with persistent leak   She is now status post placement of esophageal stent   Unfortunately, repeat abdomen/pelvis CT showed recurrent leak with new perisplenic collection   She is now status post placement of duodenal feeding tube and drainage of the perisplenic collection   Unfortunately, barium swallow shows persistent esophageal leak   Patient is tolerating enteral feeding via feeding tube in duodenum  Monitor for recurrent leak      4   Bilateral pleural effusion, status post chest tube placement   This is most likely sympathetic effusion, secondary to intra-abdominal abscesses   Chest tubes have been removed   Patient remains comfortable      5  Recurrent leukocytosis   WBC has improved but still remains in the 20s, with low-grade fever   Enlarging left-sided collection around esophageal leak at site may be responsible for this    WBC decreasing again after last drainage, but increase over last 48 hours  Will need to monitor closely  If WBC continues to improve, may need to reimage abdomen/pelvis  Antibiotic plan as in above  Monitor WBC and temperature closely      6   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   No clinical signs of cellulitis   Patient has no headache    Wound is now sutured   Drainage/leakage appears to be resolving  Monitor      Discussed with the patient and her fiance in detail regarding above plan      Antibiotics:  Unasyn  Fluconazole # 21  POD # 25  Post VPS extraction # 14  Post/drainage # 3     Subjective:  Patient is comfortable  Abdominal pain controlled     No headache   No chest wall pain   Chest wall with no further leakage  She is awake and alert  Temperature stays down   No chills  She is tolerating antibiotics well   No nausea, vomiting or diarrhea  She is tolerating tube feed well  Objective:  Vitals:  HR:  [] 112  Resp:  [17-18] 17  BP: (143-170)/(81-90) 164/89  SpO2:  [92 %-95 %] 95 %  Temp (24hrs), Av 3 °F (36 8 °C), Min:97 7 °F (36 5 °C), Max:99 °F (37 2 °C)  Current: Temperature: 97 9 °F (36 6 °C)    Physical Exam:     General: Awake, alert, cooperative, no distress  Head:  Incision healing  No drainage  No erythema  No tenderness  Chest:  Wound healing  No drainage  No erythema  No tenderness     Lungs: Decreased breath sounds, no rales, no wheezing, respirations unlabored  Heart[de-identified]  Tachycardic with regular rhythm, S1 and S2 normal, no murmur  Abdomen: Soft, mildly distended  Incision clean  No drainage/erythema  Drains with stable seropurulence  Extremities: Trace edema  No erythema/warmth  No ulcer  Nontender to palpation  Skin:  No rash  Invasive Devices     Peripherally Inserted Central Catheter Line            PICC Line 50/31/88 Right Basilic 29 days          Drain            Closed/Suction Drain Left LLQ Bulb 19 Fr  24 days    Closed/Suction Drain Left LUQ Bulb 19 Fr  24 days    Closed/Suction Drain Left Back Bulb 12 Fr  19 days    NG/OG/Enteral Tube Enteral Feeding Tube Right nares 19 days    Closed/Suction Drain Midline;Superior Abdomen Other (Comment) 10 Fr  2 days                Labs studies:   I have personally reviewed pertinent labs  Results from last 7 days  Lab Units 02/19/18  0456 02/18/18  0434 02/17/18  0458   SODIUM mmol/L 142 143 142   POTASSIUM mmol/L 3 4* 3 8 3 6   CHLORIDE mmol/L 101 103 102   CO2 mmol/L 35* 34* 34*   ANION GAP mmol/L 6 6 6   BUN mg/dL 15 16 16   CREATININE mg/dL 0 32* 0 34* 0 33*   EGFR ml/min/1 73sq m 138 135 136   GLUCOSE RANDOM mg/dL 177* 152* 162*   CALCIUM mg/dL 9 4 9 3 9 7       Results from last 7 days  Lab Units 02/19/18  0456 02/18/18  0434 02/17/18  0458   WBC Thousand/uL 18 79* 16 04* 15 34*   HEMOGLOBIN g/dL 9 3* 9 5* 9 0*   PLATELETS Thousands/uL 499* 527* 502*           Imaging Studies:   I have personally reviewed pertinent imaging study reports and images in PACS  EKG, Pathology, and Other Studies:   I have personally reviewed pertinent reports

## 2018-02-19 NOTE — PROGRESS NOTES
Progress Note - Infectious Disease   Houston Arrow 36 y o  female MRN: [id number]  Unit/Bed#: [facility] 834-01 Encounter: [id number]      Impression/Recommendations:  1   Intra-abdominal/pelvic abscesses   Patient is status post multiple abdominal washouts   She has multiple drains in place   She is status post placement of drainage in a new perisplenic collection   She is clinically well and systemically stable  Operative cultures consistently growing  only ampicillin susceptible Enterococcus   A single culture also grew Saccharomyces, most likely colonization   Patient had a new drain placed in left abdomen on 02/16   Culture this drainage is going Enterococcus again  WBC decreasing again since this last drainage    Continue with IV Unasyn/fluconazole  Continue drainage  Treat x at least 7 days from last drainage, at least another 5 days      2   Possible infected  shunt   Given presence of tip of  shunt in peritoneal space, there is high probability of  shunt infection   Fortunately, patient has no symptoms concerning for meningitis   She has neurological deficits  Azeem Rey is status post tapping of  shunt at [facility]   CSF culture there had no growth but patient had prior antibiotic  Nas Seek will go ahead and treat her for possible/presumptive  shunt infection   Patient is now status post  shunt resection   CSF culture here also negative   With removal shunt, antibiotic course can be decreased     Antibiotic plan as in above    Treat x 2 weeks post VPS resection      3   Gastric perforation, status post repair, with persistent leak   She is now status post placement of esophageal stent   Unfortunately, repeat abdomen/pelvis CT showed recurrent leak with new perisplenic collection   She is now status post placement of duodenal feeding tube and drainage of the perisplenic collection   Unfortunately, barium swallow shows persistent esophageal leak   Patient is tolerating enteral feeding via feeding tube in duodenum  Monitor for recurrent leak      4   Bilateral pleural effusion, status post chest tube placement   This is most likely sympathetic effusion, secondary to intra-abdominal abscesses   Chest tubes have been removed   Patient remains comfortable      5  Recurrent leukocytosis   WBC has improved but still remains in the 20s, with low-grade fever   Enlarging left-sided collection around esophageal leak at site may be responsible for this    WBC decreasing again after last drainage, but increase in slightly over last 24 hours  Will need to monitor closely  Antibiotic plan as in above  Monitor WBC and temperature closely      6   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   No clinical signs of cellulitis   Patient has no headache    Wound is now sutured   Drainage/leakage appears to be resolving  Monitor      Discussed with the patient in detail regarding above plan      Antibiotics:  Unasyn  Fluconazole # 20  POD # 24  Post VPS extraction # 13  Post/drainage # 2     Subjective:  Patient is comfortable  Abdominal pain is improving and controlled     No headache   No chest wall pain   Chest wall with no further leakage  She is awake and alert  Temperature stays down   No chills  She is tolerating antibiotics well   No nausea, vomiting or diarrhea  She is tolerating tube feed well  Objective:  Vitals:  HR:  [] 107  Resp:  [17-20] 17  BP: (139-157)/(74-90) 143/82  SpO2:  [92 %-96 %] 94 %  Temp (24hrs), Av 4 °F (36 9 °C), Min:97 5 °F (36 4 °C), Max:99 2 °F (37 3 °C)  Current: Temperature: 97 5 °F (36 4 °C)    Physical Exam:     General: Awake, alert, cooperative, no distress  Head:  Incision healed  No drainage  No erythema  No tenderness  Chest:  Incision healing well  No purulence  No erythema  Minimal tenderness  Lungs: Expansion symmetric, no rales, no wheezing, respirations unlabored  Heart[de-identified]  Tachycardic with regular rhythm, S1 and S2 normal, no murmur     Abdomen: Soft, mildly tender  Incision clean  No purulence  No erythema  Mild tenderness  Drains seropurulent  Extremities: Stable mild edema  No erythema/warmth  No ulcer  Nontender to palpation  Skin:  No rash  Invasive Devices     Peripherally Inserted Central Catheter Line            PICC Line 34/15/88 Right Basilic 28 days          Drain            Closed/Suction Drain Left LLQ Bulb 19 Fr  23 days    Closed/Suction Drain Left LUQ Bulb 19 Fr  23 days    Closed/Suction Drain Left Back Bulb 12 Fr  18 days    NG/OG/Enteral Tube Enteral Feeding Tube Right nares 18 days    Closed/Suction Drain Midline;Superior Abdomen Other (Comment) 10 Fr  1 day                Labs studies:   I have personally reviewed pertinent labs  Results from last 7 days  Lab Units 02/18/18  0434 02/17/18  0458 02/16/18  0537   SODIUM mmol/L 143 142 140   POTASSIUM mmol/L 3 8 3 6 3 6   CHLORIDE mmol/L 103 102 101   CO2 mmol/L 34* 34* 35*   ANION GAP mmol/L 6 6 4   BUN mg/dL 16 16 19   CREATININE mg/dL 0 34* 0 33* 0 37*   EGFR ml/min/1 73sq m 135 136 131   GLUCOSE RANDOM mg/dL 152* 162* 173*   CALCIUM mg/dL 9 3 9 7 9 4       Results from last 7 days  Lab Units 02/18/18  0434 02/17/18  0458 02/16/18  0537   WBC Thousand/uL 16 04* 15 34* 21 81*   HEMOGLOBIN g/dL 9 5* 9 0* 9 2*   PLATELETS Thousands/uL 527* 502* 462*       Results from last 7 days  Lab Units 02/12/18  1635   GRAM STAIN RESULT  2+ Polys  No bacteria seen   BODY FLUID CULTURE, STERILE  2+ Growth of Enterococcus faecalis*       Imaging Studies:   I have personally reviewed pertinent imaging study reports and images in PACS  EKG, Pathology, and Other Studies:   I have personally reviewed pertinent reports

## 2018-02-19 NOTE — PROGRESS NOTES
Patient care rounds were completed with the patient's nurse today, Jorje Chery  We discussed the plan is to keep her NPO, but increase her tube feeds and adjust her free water flushes to the newly provided goal rates for nutrition  Continue to encourage activity as much as tolerated  We discussed getting her outside for some fresh air this week and will coordinate with the nursing staff and her family for the appropriate time to do this  Her staples were removed during my evaluation of her today  We plan to continue all of her current drains as of now and to continue monitoring her subcutaneous emphysema  Continue current antibiotic regimen per Infectious Disease  We reviewed all of the invasive devices/lines/telemetry orders   - Keofed tube to remain in place for nutrition   - All current drains to remain in place for source control  Pain Assessment / Plan:  - Continue current analgesic regimen  Mobility Assessment / Plan:  - Out of bed activity as tolerated with PT and OT evaluation and treatment as indicated  Goals / Barriers for discharge:  - Continued need for enteral nutrition via a feeding tube as well as drainage of intra-abdominal collections  = Case management following  All questions and concerns were addressed  I spent greater than 20 minutes reviewing the plan with the patient and the nurse, and coordinating her care for the day      Davin Baez PA-C  2/19/2018 10:56 AM

## 2018-02-19 NOTE — PROGRESS NOTES
Progress Note - General Surgery   Isabel Grate 36 y o  female MRN: [id number]  Unit/Bed#: [facility] 834-01 Encounter: [id number]    Assessment:  36y o -year-old female with gastric perforation s/p hiatal hernia repair at outside hospital, s/p esophageal stent placement, exploratory laparotomy and washout  Has had persistent esophageal/gastric leak on imaging  Had bilateral pleural effusions which were drained  Perisplenic fluid collection was drained  - IR drain placed in GE leak  - WBC 16 yesterday  - Exam stable, no fevers  - Tolerating tube feeds    Plan:  Continue NPO w/ TF  Monitor drain outputs  Trend WBC- improved s/p drainage  PRN pain control  Continue antibiotics per ID- unasyn/fluconazole  Cont to motnior exam    Subjective/Objective   Chief Complaint: None    Subjective: Tolerating tube feed, abd pain stable     Objective:     Blood pressure 143/81, pulse 99, temperature 98 9 °F (37 2 °C), temperature source Oral, resp  rate 18, height 5' 4" (1 626 m), weight 65 9 kg (145 lb 4 5 oz), SpO2 93 %, not currently breastfeeding  ,Body mass index is 24 94 kg/m²  Intake/Output Summary (Last 24 hours) at 02/19/18 0620  Last data filed at 02/19/18 0618   Gross per 24 hour   Intake              500 ml   Output             2555 ml   Net            -2055 ml       Invasive Devices     Peripherally Inserted Central Catheter Line            PICC Line 05/03/72 Right Basilic 29 days          Drain            Closed/Suction Drain Left LLQ Bulb 19 Fr  24 days    Closed/Suction Drain Left LUQ Bulb 19 Fr  24 days    Closed/Suction Drain Left Back Bulb 12 Fr  18 days    NG/OG/Enteral Tube Enteral Feeding Tube Right nares 18 days    Closed/Suction Drain Midline;Superior Abdomen Other (Comment) 10 Fr  2 days                Physical Exam:   Gen: A&O, NAD  Cardio: RRR  Lungs: CTAB  Abd: Soft, non distended, minimally tender, brendan at new IR drain insertion site   IR drain to epigastrum, minimal dark output in tubing, none in bag  Drains to left abdomen w/ greenish dark output      Lab, Imaging and other studies:  CBC:   No results found for: WBC, HGB, HCT, MCV, PLT, ADJUSTEDWBC, MCH, MCHC, RDW, MPV, NRBC, CMP:   No results found for: NA, K, CL, CO2, ANIONGAP, BUN, CREATININE, GLUCOSE, CALCIUM, AST, ALT, ALKPHOS, PROT, ALBUMIN, BILITOT, EGFR  VTE Pharmacologic Prophylaxis: Heparin  VTE Mechanical Prophylaxis: sequential compression device

## 2018-02-20 LAB
GLUCOSE SERPL-MCNC: 170 MG/DL (ref 65–140)
GLUCOSE SERPL-MCNC: 179 MG/DL (ref 65–140)
GLUCOSE SERPL-MCNC: 200 MG/DL (ref 65–140)
GLUCOSE SERPL-MCNC: 93 MG/DL (ref 65–140)

## 2018-02-20 PROCEDURE — 97535 SELF CARE MNGMENT TRAINING: CPT

## 2018-02-20 PROCEDURE — 99024 POSTOP FOLLOW-UP VISIT: CPT | Performed by: SURGERY

## 2018-02-20 PROCEDURE — 82948 REAGENT STRIP/BLOOD GLUCOSE: CPT

## 2018-02-20 PROCEDURE — 97110 THERAPEUTIC EXERCISES: CPT

## 2018-02-20 PROCEDURE — 99233 SBSQ HOSP IP/OBS HIGH 50: CPT | Performed by: INTERNAL MEDICINE

## 2018-02-20 PROCEDURE — 97116 GAIT TRAINING THERAPY: CPT

## 2018-02-20 PROCEDURE — C9113 INJ PANTOPRAZOLE SODIUM, VIA: HCPCS | Performed by: NURSE PRACTITIONER

## 2018-02-20 PROCEDURE — 94762 N-INVAS EAR/PLS OXIMTRY CONT: CPT

## 2018-02-20 RX ADMIN — HYDROMORPHONE HYDROCHLORIDE 1 MG: 1 INJECTION, SOLUTION INTRAMUSCULAR; INTRAVENOUS; SUBCUTANEOUS at 09:45

## 2018-02-20 RX ADMIN — FLUCONAZOLE 400 MG: 2 INJECTION INTRAVENOUS at 17:29

## 2018-02-20 RX ADMIN — HYDROMORPHONE HYDROCHLORIDE 1 MG: 1 INJECTION, SOLUTION INTRAMUSCULAR; INTRAVENOUS; SUBCUTANEOUS at 00:50

## 2018-02-20 RX ADMIN — Medication 3 G: at 17:37

## 2018-02-20 RX ADMIN — ONDANSETRON 4 MG: 2 INJECTION INTRAMUSCULAR; INTRAVENOUS at 02:55

## 2018-02-20 RX ADMIN — HYDROMORPHONE HYDROCHLORIDE 0.5 MG: 1 INJECTION, SOLUTION INTRAMUSCULAR; INTRAVENOUS; SUBCUTANEOUS at 02:53

## 2018-02-20 RX ADMIN — INSULIN LISPRO 1 UNITS: 100 INJECTION, SOLUTION INTRAVENOUS; SUBCUTANEOUS at 17:42

## 2018-02-20 RX ADMIN — Medication 3 G: at 13:24

## 2018-02-20 RX ADMIN — ONDANSETRON 4 MG: 2 INJECTION INTRAMUSCULAR; INTRAVENOUS at 07:05

## 2018-02-20 RX ADMIN — HYDROMORPHONE HYDROCHLORIDE 1 MG: 1 INJECTION, SOLUTION INTRAMUSCULAR; INTRAVENOUS; SUBCUTANEOUS at 05:52

## 2018-02-20 RX ADMIN — HYDROMORPHONE HYDROCHLORIDE 1 MG: 1 INJECTION, SOLUTION INTRAMUSCULAR; INTRAVENOUS; SUBCUTANEOUS at 15:45

## 2018-02-20 RX ADMIN — HYDROMORPHONE HYDROCHLORIDE 1 MG: 1 INJECTION, SOLUTION INTRAMUSCULAR; INTRAVENOUS; SUBCUTANEOUS at 19:20

## 2018-02-20 RX ADMIN — PANTOPRAZOLE SODIUM 40 MG: 40 INJECTION, POWDER, FOR SOLUTION INTRAVENOUS at 09:36

## 2018-02-20 RX ADMIN — ONDANSETRON 4 MG: 2 INJECTION INTRAMUSCULAR; INTRAVENOUS at 15:45

## 2018-02-20 RX ADMIN — METOPROLOL TARTRATE 10 MG: 1 INJECTION, SOLUTION INTRAVENOUS at 05:50

## 2018-02-20 RX ADMIN — HEPARIN SODIUM 5000 UNITS: 5000 INJECTION, SOLUTION INTRAVENOUS; SUBCUTANEOUS at 09:36

## 2018-02-20 RX ADMIN — INSULIN LISPRO 1 UNITS: 100 INJECTION, SOLUTION INTRAVENOUS; SUBCUTANEOUS at 13:24

## 2018-02-20 RX ADMIN — Medication 3 G: at 06:05

## 2018-02-20 RX ADMIN — METOPROLOL TARTRATE 10 MG: 1 INJECTION, SOLUTION INTRAVENOUS at 13:24

## 2018-02-20 RX ADMIN — ONDANSETRON 4 MG: 2 INJECTION INTRAMUSCULAR; INTRAVENOUS at 19:45

## 2018-02-20 RX ADMIN — HEPARIN SODIUM 5000 UNITS: 5000 INJECTION, SOLUTION INTRAVENOUS; SUBCUTANEOUS at 21:45

## 2018-02-20 RX ADMIN — METOPROLOL TARTRATE 10 MG: 1 INJECTION, SOLUTION INTRAVENOUS at 17:42

## 2018-02-20 NOTE — PROGRESS NOTES
Progress Note - Infectious Disease   Ernst Juarez 36 y o  female MRN: [id number]  Unit/Bed#: [facility] 834-01 Encounter: [id number]      Impression/Recommendations:  1   Intra-abdominal/pelvic abscesses   Patient is status post multiple abdominal washouts   She has multiple drains in place   She is status post placement of drainage in a new perisplenic collection   She is clinically well and systemically stable  Operative cultures consistently growing  only ampicillin susceptible Enterococcus   A single culture also grew Saccharomyces, most likely colonization   Patient had a new drain placed in left abdomen on 02/16   Culture this drainage is growing Enterococcus again, with no other pathogens   WBC decreasing again since this last drainage but increased again over last 48 hours    Continue with IV Unasyn/fluconazole  Continue drainage    Treat x 14 days from last drainage, at least another 10 days      2   Possible infected  shunt   Given presence of tip of  shunt in peritoneal space, there is high probability of  shunt infection   Fortunately, patient has no symptoms concerning for meningitis   She has neurological deficits   She is status post tapping of  shunt at [facility]   CSF culture there had no growth but patient had prior antibiotic  Tressa Tapia will go ahead and treat her for possible/presumptive  shunt infection   Patient is now status post  shunt resection   CSF culture here also negative  Cleo Norwood completed 2 weeks of antibiotic after shunt removal     No further antibiotic needed for this      3   Gastric perforation, status post repair, with persistent leak   She is now status post placement of esophageal stent   Unfortunately, repeat abdomen/pelvis CT showed recurrent leak with new perisplenic collection   She is now status post placement of duodenal feeding tube and drainage of the perisplenic collection   Unfortunately, barium swallow shows persistent esophageal leak   Patient is tolerating enteral feeding via feeding tube in duodenum  Monitor for recurrent leak      4   Bilateral pleural effusion, status post chest tube placement   This is most likely sympathetic effusion, secondary to intra-abdominal abscesses   Chest tubes have been removed   Patient remains comfortable      5  Recurrent leukocytosis   WBC has improved but still remains in the 20s, with low-grade fever   Enlarging left-sided collection around esophageal leak at site may be responsible for this    WBC decreasing again after last drainage, but increase over last 72 hours   Will need to monitor closely  If WBC continues to improve, may need to reimage abdomen/pelvis  Antibiotic plan as in above  Monitor WBC and temperature closely  Recheck in a m        6   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   No clinical signs of cellulitis   Patient has no headache    Wound is now sutured   Drainage/leakage appears to be resolving  Monitor      Discussed with the patient and her family in detail regarding above plan  Multiple questions answered  Time spent in as in questions 20-30 minutes      Antibiotics:  Unasyn  Fluconazole # 22  POD # 26  Post/drainage # 4     Subjective:  Patient is comfortable  Abdominal pain controlled     Temperature stays down   No chills  She is tolerating antibiotics well   No nausea, vomiting or diarrhea  She is tolerating tube feed well  Objective:  Vitals:  HR:  [] 104  Resp:  [16-18] 18  BP: (141-164)/(79-89) 141/82  SpO2:  [90 %-100 %] 99 %  Temp (24hrs), Av 7 °F (37 1 °C), Min:97 9 °F (36 6 °C), Max:99 3 °F (37 4 °C)  Current: Temperature: 98 5 °F (36 9 °C)    Physical Exam:     General: Awake, alert, cooperative, no distress  Lungs: Expansion symmetric, no rales, no wheezing, respirations unlabored  Heart[de-identified]  Tachycardic with regular rhythm, S1 and S2 normal, no murmur  Abdomen: Soft, mildly distended  Incision clean  No purulence  No erythema    Drains clearer but still seropurulent  Extremities: Trace edema  No erythema/warmth  No ulcer  Nontender to palpation  Skin:  No rash  Invasive Devices     Peripherally Inserted Central Catheter Line            PICC Line 07/06/51 Right Basilic 30 days          Drain            Closed/Suction Drain Left LLQ Bulb 19 Fr  25 days    Closed/Suction Drain Left LUQ Bulb 19 Fr  25 days    Closed/Suction Drain Left Back Bulb 12 Fr  20 days    NG/OG/Enteral Tube Enteral Feeding Tube Right nares 20 days    Closed/Suction Drain Midline;Superior Abdomen Other (Comment) 10 Fr  3 days                Labs studies:   I have personally reviewed pertinent labs  Results from last 7 days  Lab Units 02/19/18  0456 02/18/18  0434 02/17/18  0458   SODIUM mmol/L 142 143 142   POTASSIUM mmol/L 3 4* 3 8 3 6   CHLORIDE mmol/L 101 103 102   CO2 mmol/L 35* 34* 34*   ANION GAP mmol/L 6 6 6   BUN mg/dL 15 16 16   CREATININE mg/dL 0 32* 0 34* 0 33*   EGFR ml/min/1 73sq m 138 135 136   GLUCOSE RANDOM mg/dL 177* 152* 162*   CALCIUM mg/dL 9 4 9 3 9 7       Results from last 7 days  Lab Units 02/19/18  0456 02/18/18  0434 02/17/18  0458   WBC Thousand/uL 18 79* 16 04* 15 34*   HEMOGLOBIN g/dL 9 3* 9 5* 9 0*   PLATELETS Thousands/uL 499* 527* 502*           Imaging Studies:   I have personally reviewed pertinent imaging study reports and images in PACS  EKG, Pathology, and Other Studies:   I have personally reviewed pertinent reports

## 2018-02-20 NOTE — SOCIAL WORK
Cm rounded with Sandie CASTLE and pt getting closer to discharge   Pt will d/c with tube feedings and ADENIKE drains  Pt will need tube feedings for a month in not longer   Cm called pt mom and reviewed [facility]   Cm also spoke with pt and she is agreeable and requested to stay in [place] for [facility]   Pt mom agreeable to referral to [facility], referral in in as such

## 2018-02-20 NOTE — PROGRESS NOTES
Progress Note - General Surgery   Michelle Melo 36 y o  female MRN: [id number]  Unit/Bed#: [facility] 834-01 Encounter: [id number]    Assessment:  36y o -year-old female with gastric perforation s/p hiatal hernia repair at outside hospital, s/p esophageal stent placement, exploratory laparotomy and washout  Has had persistent esophageal/gastric leak on imaging  Had bilateral pleural effusions which were drained  Perisplenic fluid collection was drained  - IR drain placed in GE leak  - Tolerating tube feeds however DHT blocked overnight  - Will attempt to unclog today  - Cont drainage  - Trend WBC  - Abd wall stable    Plan:  Continue NPO w/ TF  Monitor drain outputs  Trend leukocytosis  Continue antibiotics per ID- unasyn/fluconazole  Cont to motnior exam    Subjective/Objective   Chief Complaint: None    Subjective: Tube feeds held, abd pain stable     Objective:     Blood pressure 164/86, pulse 97, temperature 99 1 °F (37 3 °C), temperature source Oral, resp  rate 18, height 5' 4" (1 626 m), weight 65 9 kg (145 lb 4 5 oz), SpO2 100 %, not currently breastfeeding  ,Body mass index is 24 94 kg/m²  Intake/Output Summary (Last 24 hours) at 02/20/18 0554  Last data filed at 02/20/18 0543   Gross per 24 hour   Intake              770 ml   Output             2955 ml   Net            -2185 ml       Invasive Devices     Peripherally Inserted Central Catheter Line            PICC Line 00/24/49 Right Basilic 30 days          Drain            Closed/Suction Drain Left LLQ Bulb 19 Fr  25 days    Closed/Suction Drain Left LUQ Bulb 19 Fr  25 days    Closed/Suction Drain Left Back Bulb 12 Fr  19 days    NG/OG/Enteral Tube Enteral Feeding Tube Right nares 19 days    Closed/Suction Drain Midline;Superior Abdomen Other (Comment) 10 Fr  3 days                Physical Exam:   Gen: A&O, NAD  Cardio: RRR  Lungs: CTAB  Abd: Soft, non distended, minimally tender, brendan at new IR drain insertion site   IR drain to epigastrum, minimal dark output in tubing, none in bag   Drains to left abdomen w/ greenish dark output      Lab, Imaging and other studies:  CBC:   No results found for: WBC, HGB, HCT, MCV, PLT, ADJUSTEDWBC, MCH, MCHC, RDW, MPV, NRBC, CMP:   No results found for: NA, K, CL, CO2, ANIONGAP, BUN, CREATININE, GLUCOSE, CALCIUM, AST, ALT, ALKPHOS, PROT, ALBUMIN, BILITOT, EGFR  VTE Pharmacologic Prophylaxis: Heparin  VTE Mechanical Prophylaxis: sequential compression device

## 2018-02-20 NOTE — PROGRESS NOTES
Patient care rounds were completed with the patient's nurse today, Isaias Paige  We discussed the plan is to continue her tube feeds and water flushes at the new goal rates  I have continue IV antibiotics per Infectious Disease through 02/23/2018 at least   We have discontinued the left upper quadrant drain; the remaining drain should remain in place at this time  Recheck blood work tomorrow morning  The begin planning for potential discharge in the near future if patient continues to remain stable and/or improved  We reviewed all of the invasive devices/lines/telemetry orders   - Right-sided PICC line to remain in place for IV access and potential long-term IV /home antibiotics  - Continue all current drains for management of intra-abdominal infection and leak  Pain Assessment / Plan:  - Continue current analgesic regimen  Mobility Assessment / Plan:  - Continue PT and OT evaluation and treatment as indicated  Goals / Barriers for discharge:  - Need for further IV antibiotics, continue tube feeds, and close follow-up  Patient is continuing to improve and has remained stable since her most recent drainage procedure indicating that she may ready for discharge from the inpatient setting soon  - Case management following  All questions and concerns were addressed  I spent greater than 20 minutes reviewing the plan with the patient and the nurse, and coordinating her care for the day      Richar Chopra PA-C  2/20/2018 10:30 AM

## 2018-02-20 NOTE — PHYSICAL THERAPY NOTE
Physical Therapy Progress Note         02/20/18 1504   Pain Assessment   Pain Assessment 0-10   Pain Score 9   Pain Type Surgical pain;Acute pain   Pain Location Abdomen   Pain Orientation Bilateral   Restrictions/Precautions   Other Precautions Multiple lines;Telemetry;O2;Fall Risk;Pain   General   Chart Reviewed Yes   Family/Caregiver Present Yes  (mother )   Subjective   Subjective pt agreeable to perform PT session   Bed Mobility   Additional Comments OOB in recliner    Transfers   Sit to Stand 3  Moderate assistance   Additional items Assist x 2;Verbal cues;Armrests   Stand to Sit 3  Moderate assistance   Additional items Assist x 2; Increased time required;Verbal cues;Armrests   Stand pivot 3  Moderate assistance   Additional items Verbal cues; Increased time required   Additional Comments RW needs for SPT and STS to RW    Ambulation/Elevation   Gait pattern Improper Weight shift;Decreased foot clearance; Excessively slow   Gait Assistance 4  Minimal assist   Additional items Assist x 2;Verbal cues   Assistive Device Rolling walker   Distance 30 x2 with chair folloe    Stair Management Assistance Not tested   Balance   Static Sitting Fair   Static Standing Fair -   Ambulatory Poor +   Endurance Deficit   Endurance Deficit Yes   Endurance Deficit Description fatigue and pain    Activity Tolerance   Activity Tolerance Patient limited by fatigue;Patient limited by pain   Exercises   Hamstring Stretch PROM   Quad Sets 20 reps   Glute Sets 20 reps   Hip Abduction 15 reps   Hip Adduction 15 reps   Knee AROM Short Arc Quad 15 reps   Knee AROM Long Arc Quad 15 reps   Ankle Pumps 15 reps   Heel Cord Stretch PROM   Balance Training Standing   Balance training  standing with RW for supprt    Assessment   Prognosis Good   Problem List Decreased strength;Decreased endurance; Impaired balance;Decreased mobility;Pain   Assessment pt perform thex ex's functional activities /mobility and gait training with RW ambulation Rhett x3 for chair follow , IV pole and 02 tank   pt states fearful of falling during walking d/t B/L LE knee buckling  Pt able to inc git distance with chair follow 02 tank and IV pole , pt b/l LE buckling post 30 feet Colby falling back into chair ,pt okay no problems   Pt 2nd ambulation with RW 02 tank and IVpole with chair follow 30 feet with RW no LE buckling , pt getting stronger and better endurance 2nd walk/  Pt return to recliner withall needs in reach and perform thex ex's   Pt will cont to benefit with PT to meet goals and monitor SxS   Barriers to Discharge Decreased caregiver support; Inaccessible home environment   Goals   Patient Goals none stated    STG Expiration Date 02/21/18   Plan   Treatment/Interventions Functional transfer training;LE strengthening/ROM; Therapeutic exercise; Endurance training;Patient/family training;Gait training   Progress Progressing toward goals   PT Frequency 5x/wk   Recommendation   Recommendation Post acute IP rehab   PT - OK to Discharge Yes     Alina Tai, PTA

## 2018-02-20 NOTE — CASE MANAGEMENT
Thank you,  [facility] in the [facility] by Donald Villa for 2017  Network Utilization Review Department  Phone: [phone]; Fax [phone]  ATTENTION: The Network Utilization Review Department is now centralized for our 7 Facilities  Make a note that we have a new phone and fax numbers for our Department  Please call with any questions or concerns to [phone] and carefully follow the prompts so that you are directed to the right person  All voicemails are confidential  Fax any determinations, approvals, denials, and requests for initial or continue stay review clinical to [phone]  Due to HIGH CALL volume, it would be easier if you could please send faxed requests to expedite your requests and in part, help us provide discharge notifications faster  Continued Stay Review    Date/POD#: 2/20/2018    Vital Signs: /77 (BP Location: Right arm)   Pulse 93   Temp 98 1 °F (36 7 °C) (Oral)   Resp 18   Ht 5' 4" (1 626 m)   Wt 59 6 kg (131 lb 6 4 oz)   LMP  (LMP Unknown)   SpO2 100%   Breastfeeding?  No   BMI 22 55 kg/m²     Medication:   Scheduled Meds:   Current Facility-Administered Medications:  acetaminophen 975 mg Oral [facility] Hari Hicks PA-C    acetaminophen 325 mg Rectal Q4H PRN Bethany August MD    ampicillin-sulbactam 3 g Intravenous Q6H Denise Esparza MD Last Rate: 3 g (02/20/18 1324)   bisacodyl 10 mg Rectal Daily PRN Bethany August MD    fluconazole 400 mg Intravenous Q24H ABNER Emanuel Last Rate: 400 mg (02/19/18 1813)   gabapentin 300 mg Oral HS Louetta Kayser, MD    heparin (porcine) 5,000 Units Subcutaneous Q12H [address] Louetta Kayser, MD    HYDROmorphone 0 5 mg Intravenous Q3H PRN Muna Hunter PA-C    HYDROmorphone 1 mg Intravenous Q3H PRN Kristine Perez MD    insulin lispro 1-5 Units Subcutaneous Q6H [address] Nayla Mcconnell PA-C    iohexol 50 mL Oral 90 min pre-procedure Jaxon Vaca MD    iohexol 50 mL Oral 90 min pre-procedure Jaison Isbell MD    iohexol 50 mL Oral 60 Min Pre-Op Marcia Biundo, AURELIO    menthol-methyl salicylate  Apply externally 4x Daily PRN Kvng Bales MD    metoprolol 10 mg Intravenous Q6H Vida Carrera MD    mirtazapine 15 mg Oral HS Brandy Senstevie, AURELIO    nortriptyline 10 mg Oral BID Vida Carrera MD    ondansetron 4 mg Intravenous Q4H PRN Keagan Holbrook MD    pantoprazole 40 mg Intravenous Q24H [address] ABNER Fountain      PRN Meds: acetaminophen    bisacodyl    HYDROmorphone 0 5 mg x 1 so far today     HYDROmorphone 1 mg iv x 4 so far today     menthol-methyl salicylate    Ondansetron IV x 3 so far today     Abnormal Labs/Diagnostic Results:     2/20     Age/Sex: 36 y o  female     Assessment/Plan:   2/20 Surgery Progress Note  36y o -year-old female with gastric perforation s/p hiatal hernia repair at outside hospital, s/p esophageal stent placement, exploratory laparotomy and washout  Has had persistent esophageal/gastric leak on imaging  Had bilateral pleural effusions which were drained  Perisplenic fluid collection was drained        - IR drain placed in GE leak  - Tolerating tube feeds however DHT blocked overnight  - Will attempt to unclog today  - Cont drainage  - Trend WBC  - Abd wall stable     Plan:  Continue NPO w/ TF  Monitor drain outputs  Trend leukocytosis  Continue antibiotics per ID- unasyn/fluconazole  Cont to motnior exam  _____________________________  2/20 ID Progress Note  1   Intra-abdominal/pelvic abscesses   Patient is status post multiple abdominal washouts   She has multiple drains in place   She is status post placement of drainage in a new perisplenic collection   She is clinically well and systemically stable  Operative cultures consistently growing  only ampicillin susceptible Enterococcus   A single culture also grew Saccharomyces, most likely colonization   Patient had a new drain placed in left abdomen on 02/16   Culture this drainage is growing Enterococcus again, with no other pathogens   WBC decreasing again since this last drainage but increased again over last 48 hours    Continue with IV Unasyn/fluconazole  Continue drainage  Treat x 14 days from last drainage, at least another 10 days      2   Possible infected  shunt   Given presence of tip of  shunt in peritoneal space, there is high probability of  shunt infection   Fortunately, patient has no symptoms concerning for meningitis   She has neurological deficits   She is status post tapping of  shunt at [facility]   CSF culture there had no growth but patient had prior antibiotic  Tyree Mcgee will go ahead and treat her for possible/presumptive  shunt infection   Patient is now status post  shunt resection   CSF culture here also negative  Shai Quiñones completed 2 weeks of antibiotic after shunt removal     No further antibiotic needed for this      3   Gastric perforation, status post repair, with persistent leak   She is now status post placement of esophageal stent   Unfortunately, repeat abdomen/pelvis CT showed recurrent leak with new perisplenic collection   She is now status post placement of duodenal feeding tube and drainage of the perisplenic collection   Unfortunately, barium swallow shows persistent esophageal leak   Patient is tolerating enteral feeding via feeding tube in duodenum    Monitor for recurrent leak      4   Bilateral pleural effusion, status post chest tube placement   This is most likely sympathetic effusion, secondary to intra-abdominal abscesses   Chest tubes have been removed   Patient remains comfortable      5  Recurrent leukocytosis   WBC has improved but still remains in the 20s, with low-grade fever   Enlarging left-sided collection around esophageal leak at site may be responsible for this    WBC decreasing again after last drainage, but increase over last 72 hours   Will need to monitor closely   If WBC continues to improve, may need to reimage abdomen/pelvis  Antibiotic plan as in above  Monitor WBC and temperature closely  Recheck in a m        6   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   No clinical signs of cellulitis   Patient has no headache    Wound is now sutured   Drainage/leakage appears to be resolving  Monitor      Discussed with the patient and her family in detail regarding above plan  Multiple questions answered    Time spent in as in questions 20-30 minutes      Antibiotics:  Unasyn  Fluconazole # 22  POD # 26  Post/drainage # 4     Discharge Plan: SNF level of care

## 2018-02-20 NOTE — OCCUPATIONAL THERAPY NOTE
OccupationalTherapy Progress Note     Patient Name: Franklyn Salinas  UQLWI'U Date: 2/20/2018  Problem List  Patient Active Problem List   Diagnosis    Gastric leak    Acute peritonitis    Idiopathic intracranial hypertension    S/P  shunt     (ventriculoperitoneal) shunt status    Murmur, cardiac    Refusal of blood product    Gastroesophageal reflux disease    Choroidal nevus, right eye           02/20/18 1415   Restrictions/Precautions   Weight Bearing Precautions Per Order No   Other Precautions Multiple lines;Telemetry;O2;Fall Risk;Pain   Pain Assessment   Pain Assessment 0-10   Pain Score 6   Pain Type Acute pain   Pain Location Abdomen; Chest   Pain Orientation Bilateral   Hospital Pain Intervention(s) Ambulation/increased activity;Repositioned   Response to Interventions tolerated   ADL   LB Dressing Assistance 5  Supervision/Setup   LB Dressing Deficit Setup; Increased time to complete; Don/doff R sock; Don/doff L sock   LB Dressing Comments Pt performs LB dressing of SOCKS ONLY in bed w/ bed in chair mode w/ set up using cross over Cerda Anna  4  Minimal Assistance   Toileting Deficit Increased time to complete;Clothing management up;Clothing management down   Toileting Comments Pt performed toileting on standard height toilet  Pt jigar to perform perineal hygiene while pt seated on toilet  Pt w/ significantly poor standing balance at toilet and would require A to manage pants for toileting  Pt also requires A on/off toilet   Bed Mobility   Supine to Sit 4  Minimal assistance   Additional items Assist x 1;Bedrails; Increased time required;Verbal cues;LE management   Transfers   Sit to Stand 3  Moderate assistance   Additional items Assist x 2; Increased time required;Verbal cues   Stand to Sit 3  Moderate assistance   Additional items Assist x 2; Increased time required;Verbal cues   Toilet transfer 3  Moderate assistance   Additional items Assist x 1; Increased time required;Verbal cues;Standard toilet   Additional Comments Pt performs functional sit to stand transfers w/ mod A x2 for force production, steadying/balance, verbal cues for safe hand/foot placement, cues to sequence and cues to engage 2* significant anxiety and pain  Pt w/ poor control on descend to toilet  Functional Mobility   Functional Mobility 4  Minimal assistance   Additional Comments Pt performs functional mobility using rw for short in room distances w/ min A for steadying/balance  Pt presents w/ knee buckling 2* increased fatigue requiring A to correct  2nd person present for line management and chair follow required for longer distances    Additional items Rolling walker   Cognition   Arousal/Participation Alert; Responsive; Cooperative   Attention Within functional limits   Memory Within functional limits   Following Commands Follows one step commands with increased time or repetition   Comments Pt presents w/ flat affect and anxiety regarding pain  Pt requires increased time and significant repetition to follow one step commands   Activity Tolerance   Activity Tolerance Patient limited by fatigue;Patient limited by pain   Medical Staff Made Aware MANDIE Paige   Assessment   Assessment Pt participated in OT tx session focusing on LB dressing, toileting, bed mobility, functional transfer trials and functional mobility (please see above for details)  Pt performed LB dressing (socks only) in bed using cross over method w/ set up  Pt performed bed mobility with min A x1 w/ education regarding rolling technique for increased pain control  Pt performed sit to stand transfer at EOB w/ mod A x2 requiring mild force production and mod A x2 to transfer from standing to sitting onto standard height toilet 2* decreased control on descend  Pt performed toileting on standard height toilet w/ min A  Pt performed functional mobilit using rw w/ min A x1 and A x2nd for line management and 3rd for chair follow for longer distances   Pt remains limited 2* significant pain, decreased endurance/activity tolerance, decreased functional forward reach, decreased ADL status, impaired balance, decreased mobility status, knee buckling, deconditioning/generalized weakness, anxiety, and decreased motivation  Continue to recommend STR upon D/C  Will continue to follow and address previously stated goals  Plan   Treatment Interventions ADL retraining;Functional transfer training;UE strengthening/ROM; Endurance training;Cognitive reorientation;Patient/family training;Equipment evaluation/education; Compensatory technique education;Continued evaluation; Energy conservation; Activityengagement   Goal Expiration Date 03/04/18   Treatment Day 4   OT Frequency 3-5x/wk   Recommendation   OT Discharge Recommendation Short Term Rehab   OT - OK to Discharge Yes  (to STR when medically stable)   Barthel Index   Feeding 0   Bathing 0   Grooming Score 0   Dressing Score 5   Bladder Score 10   Bowels Score 10   Toilet Use Score 5   Transfers (Bed/Chair) Score 5   Mobility (Level Surface) Score 0   Stairs Score 0   Barthel Index Score 35   Modified Sendy Scale   Modified Sendy Scale 4       Agustín Guevara MS, OTR/L

## 2018-02-20 NOTE — PLAN OF CARE
Problem: PHYSICAL THERAPY ADULT  Goal: Performs mobility at highest level of function for planned discharge setting  See evaluation for individualized goals  Treatment/Interventions: Functional transfer training, LE strengthening/ROM, Therapeutic exercise, Endurance training, Bed mobility, Spoke to nursing  Equipment Recommended:  (R/O LEAST RESTRICTIVE AD )       See flowsheet documentation for full assessment, interventions and recommendations  Outcome: Progressing  Prognosis: Good  Problem List: Decreased strength, Decreased endurance, Impaired balance, Decreased mobility, Pain  Assessment: pt perform thex ex's functional activities /mobility and gait training with RW ambulation Rhett x3  for chair follow , IV pole and 02 tank   pt states fearful of falling during walking d/t B/L LE knee buckling  Pt able to inc git distance with chair follow 02 tank and IV pole , pt b/l LE buckling post 30 feet Rhett falling back into chair ,pt okay no problems   Pt 2nd ambulation with RW 02 tank and IVpole with chair follow 30 feet with RW no LE buckling , pt getting stronger and better endurance 2nd walk/  Pt return to recliner withall needs in reach and perform thex ex's   Pt will cont to benefit with PT to meet goals and monitor SxS   Barriers to Discharge: Decreased caregiver support, Inaccessible home environment     Recommendation: Post acute IP rehab     PT - OK to Discharge: Yes    See flowsheet documentation for full assessment

## 2018-02-20 NOTE — PROGRESS NOTES
Keofed tube is resistant to flushing  Upon further nursing assessment, it seems to be blocked  Komal Stanley MD notified  He will come up and assess the tube as soon as possible  No further orders  Will continue to monitor

## 2018-02-20 NOTE — PLAN OF CARE
Problem: OCCUPATIONAL THERAPY ADULT  Goal: Performs self-care activities at highest level of function for planned discharge setting  See evaluation for individualized goals  Treatment Interventions: ADL retraining, Functional transfer training, Endurance training, Patient/family training, Equipment evaluation/education, Compensatory technique education, Activityengagement          See flowsheet documentation for full assessment, interventions and recommendations  Outcome: Progressing  Limitation: Decreased ADL status, Decreased self-care trans, Decreased high-level ADLs, Decreased endurance  Prognosis: Good  Assessment: Pt participated in OT tx session focusing on LB dressing, toileting, bed mobility, functional transfer trials and functional mobility (please see above for details)  Pt performed LB dressing (socks only) in bed using cross over method w/ set up  Pt performed bed mobility with min A x1 w/ education regarding rolling technique for increased pain control  Pt performed sit to stand transfer at EOB w/ mod A x2 requiring mild force production and mod A x2 to transfer from standing to sitting onto standard height toilet 2* decreased control on descend  Pt performed toileting on standard height toilet w/ min A  Pt performed functional mobilit using rw w/ min A x1 and A x2nd for line management and 3rd for chair follow for longer distances  Pt remains limited 2* significant pain, decreased endurance/activity tolerance, decreased functional forward reach, decreased ADL status, impaired balance, decreased mobility status, knee buckling, deconditioning/generalized weakness, anxiety, and decreased motivation  Continue to recommend STR upon D/C  Will continue to follow and address previously stated goals     Recommendation:  (PT MAY BENEFIT FROM NEUROPSYCH CONSULT )  OT Discharge Recommendation: Short Term Rehab  OT - OK to Discharge: Yes (to STR when medically stable)      James Middleton MS, OTR/L

## 2018-02-21 PROBLEM — E44.0 MODERATE PROTEIN-CALORIE MALNUTRITION (HCC): Status: ACTIVE | Noted: 2018-02-21

## 2018-02-21 LAB
ALBUMIN SERPL BCP-MCNC: 1.3 G/DL (ref 3.5–5)
ALP SERPL-CCNC: 83 U/L (ref 46–116)
ALT SERPL W P-5'-P-CCNC: 31 U/L (ref 12–78)
ANION GAP SERPL CALCULATED.3IONS-SCNC: 6 MMOL/L (ref 4–13)
AST SERPL W P-5'-P-CCNC: 23 U/L (ref 5–45)
BASOPHILS # BLD AUTO: 0.04 THOUSANDS/ΜL (ref 0–0.1)
BASOPHILS NFR BLD AUTO: 0 % (ref 0–1)
BILIRUB SERPL-MCNC: 0.22 MG/DL (ref 0.2–1)
BUN SERPL-MCNC: 14 MG/DL (ref 5–25)
CALCIUM SERPL-MCNC: 9.3 MG/DL (ref 8.3–10.1)
CHLORIDE SERPL-SCNC: 103 MMOL/L (ref 100–108)
CO2 SERPL-SCNC: 35 MMOL/L (ref 21–32)
CREAT SERPL-MCNC: 0.36 MG/DL (ref 0.6–1.3)
EOSINOPHIL # BLD AUTO: 0.62 THOUSAND/ΜL (ref 0–0.61)
EOSINOPHIL NFR BLD AUTO: 4 % (ref 0–6)
ERYTHROCYTE [DISTWIDTH] IN BLOOD BY AUTOMATED COUNT: 16.2 % (ref 11.6–15.1)
GFR SERPL CREATININE-BSD FRML MDRD: 132 ML/MIN/1.73SQ M
GLUCOSE SERPL-MCNC: 160 MG/DL (ref 65–140)
GLUCOSE SERPL-MCNC: 168 MG/DL (ref 65–140)
GLUCOSE SERPL-MCNC: 175 MG/DL (ref 65–140)
GLUCOSE SERPL-MCNC: 175 MG/DL (ref 65–140)
GLUCOSE SERPL-MCNC: 197 MG/DL (ref 65–140)
HCT VFR BLD AUTO: 28.6 % (ref 34.8–46.1)
HGB BLD-MCNC: 8.8 G/DL (ref 11.5–15.4)
LYMPHOCYTES # BLD AUTO: 1.31 THOUSANDS/ΜL (ref 0.6–4.47)
LYMPHOCYTES NFR BLD AUTO: 8 % (ref 14–44)
MCH RBC QN AUTO: 28.5 PG (ref 26.8–34.3)
MCHC RBC AUTO-ENTMCNC: 30.8 G/DL (ref 31.4–37.4)
MCV RBC AUTO: 93 FL (ref 82–98)
MONOCYTES # BLD AUTO: 0.78 THOUSAND/ΜL (ref 0.17–1.22)
MONOCYTES NFR BLD AUTO: 5 % (ref 4–12)
NEUTROPHILS # BLD AUTO: 14.49 THOUSANDS/ΜL (ref 1.85–7.62)
NEUTS SEG NFR BLD AUTO: 83 % (ref 43–75)
NRBC BLD AUTO-RTO: 0 /100 WBCS
PLATELET # BLD AUTO: 355 THOUSANDS/UL (ref 149–390)
PMV BLD AUTO: 9.2 FL (ref 8.9–12.7)
POTASSIUM SERPL-SCNC: 3.2 MMOL/L (ref 3.5–5.3)
PROT SERPL-MCNC: 5.8 G/DL (ref 6.4–8.2)
RBC # BLD AUTO: 3.09 MILLION/UL (ref 3.81–5.12)
SODIUM SERPL-SCNC: 144 MMOL/L (ref 136–145)
WBC # BLD AUTO: 17.39 THOUSAND/UL (ref 4.31–10.16)

## 2018-02-21 PROCEDURE — 99024 POSTOP FOLLOW-UP VISIT: CPT | Performed by: SURGERY

## 2018-02-21 PROCEDURE — 82948 REAGENT STRIP/BLOOD GLUCOSE: CPT

## 2018-02-21 PROCEDURE — 85025 COMPLETE CBC W/AUTO DIFF WBC: CPT | Performed by: PHYSICIAN ASSISTANT

## 2018-02-21 PROCEDURE — 80053 COMPREHEN METABOLIC PANEL: CPT | Performed by: INTERNAL MEDICINE

## 2018-02-21 PROCEDURE — 99233 SBSQ HOSP IP/OBS HIGH 50: CPT | Performed by: INTERNAL MEDICINE

## 2018-02-21 PROCEDURE — C9113 INJ PANTOPRAZOLE SODIUM, VIA: HCPCS | Performed by: NURSE PRACTITIONER

## 2018-02-21 PROCEDURE — 94760 N-INVAS EAR/PLS OXIMETRY 1: CPT

## 2018-02-21 RX ORDER — POTASSIUM CHLORIDE 29.8 MG/ML
40 INJECTION INTRAVENOUS ONCE
Status: COMPLETED | OUTPATIENT
Start: 2018-02-21 | End: 2018-02-22

## 2018-02-21 RX ADMIN — ONDANSETRON 4 MG: 2 INJECTION INTRAMUSCULAR; INTRAVENOUS at 21:59

## 2018-02-21 RX ADMIN — INSULIN LISPRO 1 UNITS: 100 INJECTION, SOLUTION INTRAVENOUS; SUBCUTANEOUS at 06:33

## 2018-02-21 RX ADMIN — HYDROMORPHONE HYDROCHLORIDE 1 MG: 1 INJECTION, SOLUTION INTRAMUSCULAR; INTRAVENOUS; SUBCUTANEOUS at 12:05

## 2018-02-21 RX ADMIN — INSULIN LISPRO 1 UNITS: 100 INJECTION, SOLUTION INTRAVENOUS; SUBCUTANEOUS at 17:56

## 2018-02-21 RX ADMIN — HYDROMORPHONE HYDROCHLORIDE 1 MG: 1 INJECTION, SOLUTION INTRAMUSCULAR; INTRAVENOUS; SUBCUTANEOUS at 22:04

## 2018-02-21 RX ADMIN — METOPROLOL TARTRATE 10 MG: 1 INJECTION, SOLUTION INTRAVENOUS at 00:05

## 2018-02-21 RX ADMIN — HEPARIN SODIUM 5000 UNITS: 5000 INJECTION, SOLUTION INTRAVENOUS; SUBCUTANEOUS at 10:41

## 2018-02-21 RX ADMIN — INSULIN LISPRO 1 UNITS: 100 INJECTION, SOLUTION INTRAVENOUS; SUBCUTANEOUS at 11:49

## 2018-02-21 RX ADMIN — Medication 3 G: at 06:26

## 2018-02-21 RX ADMIN — PANTOPRAZOLE SODIUM 40 MG: 40 INJECTION, POWDER, FOR SOLUTION INTRAVENOUS at 10:41

## 2018-02-21 RX ADMIN — METOPROLOL TARTRATE 10 MG: 1 INJECTION, SOLUTION INTRAVENOUS at 05:15

## 2018-02-21 RX ADMIN — HEPARIN SODIUM 5000 UNITS: 5000 INJECTION, SOLUTION INTRAVENOUS; SUBCUTANEOUS at 21:59

## 2018-02-21 RX ADMIN — INSULIN LISPRO 1 UNITS: 100 INJECTION, SOLUTION INTRAVENOUS; SUBCUTANEOUS at 00:15

## 2018-02-21 RX ADMIN — HYDROMORPHONE HYDROCHLORIDE 1 MG: 1 INJECTION, SOLUTION INTRAMUSCULAR; INTRAVENOUS; SUBCUTANEOUS at 00:05

## 2018-02-21 RX ADMIN — METOPROLOL TARTRATE 10 MG: 1 INJECTION, SOLUTION INTRAVENOUS at 17:56

## 2018-02-21 RX ADMIN — FLUCONAZOLE 400 MG: 2 INJECTION INTRAVENOUS at 16:04

## 2018-02-21 RX ADMIN — Medication 3 G: at 11:48

## 2018-02-21 RX ADMIN — HYDROMORPHONE HYDROCHLORIDE 1 MG: 1 INJECTION, SOLUTION INTRAMUSCULAR; INTRAVENOUS; SUBCUTANEOUS at 15:51

## 2018-02-21 RX ADMIN — Medication 3 G: at 17:58

## 2018-02-21 RX ADMIN — HYDROMORPHONE HYDROCHLORIDE 1 MG: 1 INJECTION, SOLUTION INTRAMUSCULAR; INTRAVENOUS; SUBCUTANEOUS at 05:15

## 2018-02-21 RX ADMIN — Medication 3 G: at 00:05

## 2018-02-21 RX ADMIN — METOPROLOL TARTRATE 10 MG: 1 INJECTION, SOLUTION INTRAVENOUS at 11:48

## 2018-02-21 RX ADMIN — HYDROMORPHONE HYDROCHLORIDE 1 MG: 1 INJECTION, SOLUTION INTRAMUSCULAR; INTRAVENOUS; SUBCUTANEOUS at 19:15

## 2018-02-21 RX ADMIN — POTASSIUM CHLORIDE 40 MEQ: 400 INJECTION, SOLUTION INTRAVENOUS at 11:50

## 2018-02-21 NOTE — PROGRESS NOTES
Patient care rounds were completed with the patient's nurse today, Adri Fleming  We discussed the plan is to remain status quo regarding her NPO status and current tube feed goals  Her left lower abdominal drain was able to be discontinued this morning  Will review antibiotic course with Infectious Disease today, but it appears she will require IV antibiotics through 03/02/2018 at least   We discussed that today would be a good opportunity for her to go outside for some fresh air, and our medical student and nursing staff can assist her in that endeavor  We reviewed all of the invasive devices/lines/telemetry orders   - Right-sided PICC line to remain in place for continued IV antibiotics through 03/02/2018 at least; if she is discharged to rehab prior to the completion of her IV antibiotics, the PICC line will remain in place to the completion date of her antibiotic course  - Nasogastric feeding tube to remain in place for enteral nutrition indefinitely while awaiting continued non operative management of her gastric leak/fistula  Pain Assessment / Plan:  - Continue current analgesic regimen  Mobility Assessment / Plan:  - Continue PT and OT evaluation and treatment as indicated with recommendations for rehab  Goals / Barriers for discharge:  - Continued inpatient monitoring of leukocytosis, abdominal exam, and intra-abdominal drain output with anticipated discharge during the week of 02/25/2018 if the patient continues to improve and/or remained stable  - Case management following  All questions and concerns were addressed  I spent greater than 20 minutes reviewing the plan with the patient and the nurse, and coordinating her care for the day      Kelle Spring PA-C  2/21/2018 9:13 AM

## 2018-02-21 NOTE — PROGRESS NOTES
Progress Note - General Surgery   Humble Saleem 36 y o  female MRN: [id number]  Unit/Bed#: [facility] 834-01 Encounter: [id number]    Assessment:  36y o -year-old female with gastric perforation s/p hiatal hernia repair at outside hospital, s/p esophageal stent placement, exploratory laparotomy and washout  Has had persistent esophageal/gastric leak on imaging  Had bilateral pleural effusions which were drained  Perisplenic fluid collection was drained  - IR drain placed in GE leak  - Cont drainage  - Trend WBC  - Abd wall stable    Plan:  Continue NPO w/ TF  Monitor drain outputs  Trend leukocytosis  Continue antibiotics per ID- unasyn/fluconazole until 2/23 at the earliest  Cont to motnior exam  Disop planning, needs rehab    Subjective/Objective   Chief Complaint: None    Subjective: Denies complaint    Objective:     Blood pressure 160/86, pulse 92, temperature 98 4 °F (36 9 °C), temperature source Oral, resp  rate 16, height 5' 4" (1 626 m), weight 59 6 kg (131 lb 6 4 oz), SpO2 100 %, not currently breastfeeding  ,Body mass index is 22 55 kg/m²  Intake/Output Summary (Last 24 hours) at 02/21/18 3343  Last data filed at 02/21/18 0617   Gross per 24 hour   Intake              795 ml   Output             1953 ml   Net            -1158 ml       Invasive Devices     Peripherally Inserted Central Catheter Line            PICC Line 06/57/43 Right Basilic 31 days          Drain            Closed/Suction Drain Left LLQ Bulb 19 Fr  26 days    Closed/Suction Drain Left Back Bulb 12 Fr  20 days    NG/OG/Enteral Tube Enteral Feeding Tube Right nares 20 days    Closed/Suction Drain Midline;Superior Abdomen Other (Comment) 10 Fr  4 days                Physical Exam:   Gen: A&O, NAD  Cardio: RRR  Lungs: CTAB  Abd: Soft, non distended, minimally tender, brendan at new IR drain insertion site  IR drain to epigastrum, minimal dark output in tubing, none in bag   Drains to left abdomen w/ greenish dark output      Lab, Imaging and other studies:  CBC:   Lab Results   Component Value Date    WBC 17 39 (H) 02/21/2018    HGB 8 8 (L) 02/21/2018    HCT 28 6 (L) 02/21/2018    MCV 93 02/21/2018     02/21/2018    MCH 28 5 02/21/2018    MCHC 30 8 (L) 02/21/2018    RDW 16 2 (H) 02/21/2018    MPV 9 2 02/21/2018    NRBC 0 02/21/2018   , CMP:   Lab Results   Component Value Date     02/21/2018    K 3 2 (L) 02/21/2018     02/21/2018    CO2 35 (H) 02/21/2018    ANIONGAP 6 02/21/2018    BUN 14 02/21/2018    CREATININE 0 36 (L) 02/21/2018    GLUCOSE 175 (H) 02/21/2018    CALCIUM 9 3 02/21/2018    AST 23 02/21/2018    ALT 31 02/21/2018    ALKPHOS 83 02/21/2018    PROT 5 8 (L) 02/21/2018    BILITOT 0 22 02/21/2018    EGFR 132 02/21/2018     VTE Pharmacologic Prophylaxis: Heparin  VTE Mechanical Prophylaxis: sequential compression device

## 2018-02-21 NOTE — PROGRESS NOTES
Progress Note - Infectious Disease   Michelle Melo 36 y o  female MRN: [id number]  Unit/Bed#: [facility] 834-01 Encounter: [id number]      Impression/Recommendations:  1   Intra-abdominal/pelvic abscesses   Patient is status post multiple abdominal washouts   She has multiple drains in place   She is status post placement of drainage in a new perisplenic collection   She is clinically well and systemically stable  Operative cultures consistently growing  only ampicillin susceptible Enterococcus   A single culture also grew Saccharomyces, most likely colonization   Patient had a new drain placed in left abdomen on 02/16   Culture this drainage is growing Enterococcus again, with no other pathogens   WBC decreasing again since this last drainage but increased again over last 48 hours    Continue with IV Unasyn/fluconazole  Continue drainage  Treat x 14 days from last drainage, another 9 days    Hopefully, we can transition to p  o  antibiotic in the next 1-2 days      2   Possible infected  shunt   Given presence of tip of  shunt in peritoneal space, there is high probability of  shunt infection   Fortunately, patient has no symptoms concerning for meningitis   She has neurological deficits   She is status post tapping of  shunt at [facility]   CSF culture there had no growth but patient had prior antibiotic  Jose Barney will go ahead and treat her for possible/presumptive  shunt infection   Patient is now status post  shunt resection   CSF culture here also negative  Ector Suazo completed 2 weeks of antibiotic after shunt removal     No further antibiotic needed for this      3   Gastric perforation, status post repair, with persistent leak   She is now status post placement of esophageal stent   Unfortunately, repeat abdomen/pelvis CT showed recurrent leak with new perisplenic collection   She is now status post placement of duodenal feeding tube and drainage of the perisplenic collection   Unfortunately, barium swallow shows persistent esophageal leak   Patient is tolerating enteral feeding via feeding tube in duodenum  Monitor for recurrent leak      4   Bilateral pleural effusion, status post chest tube placement   This is most likely sympathetic effusion, secondary to intra-abdominal abscesses   Chest tubes have been removed   Patient remains comfortable      5  Recurrent leukocytosis   WBC has improved but still remains in the 20s, with low-grade fever   Enlarging left-sided collection around esophageal leak at site may be responsible for this    WBC decreasing again after last drainage, but increase over last 72 hours   Will need to monitor closely   If WBC does not improve, may need to reimage abdomen/pelvis  WBC improved today  Antibiotic plan as in above  Monitor WBC and temperature closely        6   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   No clinical signs of cellulitis   Patient has no headache    Wound is now sutured   Drainage/leakage appears to be resolving  Monitor      Discussed with the patient and her fiance in detail regarding above plan      Antibiotics:  Unasyn  Fluconazole # 23  POD # 27  Post/drainage # 5     Subjective:  Patient is comfortable  Abdominal pain well controlled     Temperature stays down   No chills  She is tolerating antibiotics well   No nausea, vomiting or diarrhea  She is tolerating tube feed well  Objective:  Vitals:  HR:  [] 94  Resp:  [16-18] 18  BP: (130-172)/(72-86) 130/72  SpO2:  [99 %-100 %] 100 %  Temp (24hrs), Av 3 °F (36 8 °C), Min:97 5 °F (36 4 °C), Max:99 °F (37 2 °C)  Current: Temperature: 98 4 °F (36 9 °C)    Physical Exam:     General: Awake, alert, cooperative, no distress  Lungs: Expansion symmetric, no rales, no wheezing, respirations unlabored  Heart[de-identified]  Mildly tachycardic with regular rhythm, S1 and S2 normal, no murmur     Abdomen: Soft, mildly distended, mild diffuse tenderness, bowel sounds active all four quadrants,        no masses, no organomegaly  Incision clean  Drain seropurulent  Extremities: Trace edema  No erythema/warmth  No ulcer  Nontender to palpation  Skin:  No rash  Invasive Devices     Peripherally Inserted Central Catheter Line            PICC Line 86/06/14 Right Basilic 31 days          Drain            Closed/Suction Drain Left LLQ Bulb 19 Fr  26 days    Closed/Suction Drain Left Back Bulb 12 Fr  21 days    NG/OG/Enteral Tube Enteral Feeding Tube Right nares 21 days    Closed/Suction Drain Midline;Superior Abdomen Other (Comment) 10 Fr  4 days                Labs studies:   I have personally reviewed pertinent labs  Results from last 7 days  Lab Units 02/21/18  0505 02/19/18  0456 02/18/18  0434   SODIUM mmol/L 144 142 143   POTASSIUM mmol/L 3 2* 3 4* 3 8   CHLORIDE mmol/L 103 101 103   CO2 mmol/L 35* 35* 34*   ANION GAP mmol/L 6 6 6   BUN mg/dL 14 15 16   CREATININE mg/dL 0 36* 0 32* 0 34*   EGFR ml/min/1 73sq m 132 138 135   GLUCOSE RANDOM mg/dL 175* 177* 152*   CALCIUM mg/dL 9 3 9 4 9 3   AST U/L 23  --   --    ALT U/L 31  --   --    ALK PHOS U/L 83  --   --    TOTAL PROTEIN g/dL 5 8*  --   --    BILIRUBIN TOTAL mg/dL 0 22  --   --        Results from last 7 days  Lab Units 02/21/18  0505 02/19/18  0456 02/18/18  0434   WBC Thousand/uL 17 39* 18 79* 16 04*   HEMOGLOBIN g/dL 8 8* 9 3* 9 5*   PLATELETS Thousands/uL 355 499* 527*           Imaging Studies:   I have personally reviewed pertinent imaging study reports and images in PACS  EKG, Pathology, and Other Studies:   I have personally reviewed pertinent reports

## 2018-02-22 ENCOUNTER — APPOINTMENT (INPATIENT)
Dept: RADIOLOGY | Facility: HOSPITAL | Age: 43
DRG: 711 | End: 2018-02-22
Payer: COMMERCIAL

## 2018-02-22 LAB
ANION GAP SERPL CALCULATED.3IONS-SCNC: 3 MMOL/L (ref 4–13)
BUN SERPL-MCNC: 15 MG/DL (ref 5–25)
CALCIUM SERPL-MCNC: 9.8 MG/DL (ref 8.3–10.1)
CHLORIDE SERPL-SCNC: 101 MMOL/L (ref 100–108)
CO2 SERPL-SCNC: 37 MMOL/L (ref 21–32)
CREAT SERPL-MCNC: 0.37 MG/DL (ref 0.6–1.3)
ERYTHROCYTE [DISTWIDTH] IN BLOOD BY AUTOMATED COUNT: 16.4 % (ref 11.6–15.1)
GFR SERPL CREATININE-BSD FRML MDRD: 131 ML/MIN/1.73SQ M
GLUCOSE SERPL-MCNC: 131 MG/DL (ref 65–140)
GLUCOSE SERPL-MCNC: 167 MG/DL (ref 65–140)
GLUCOSE SERPL-MCNC: 173 MG/DL (ref 65–140)
GLUCOSE SERPL-MCNC: 181 MG/DL (ref 65–140)
GLUCOSE SERPL-MCNC: 183 MG/DL (ref 65–140)
GLUCOSE SERPL-MCNC: 186 MG/DL (ref 65–140)
HCT VFR BLD AUTO: 30.9 % (ref 34.8–46.1)
HGB BLD-MCNC: 9.4 G/DL (ref 11.5–15.4)
MAGNESIUM SERPL-MCNC: 2.2 MG/DL (ref 1.6–2.6)
MCH RBC QN AUTO: 28.4 PG (ref 26.8–34.3)
MCHC RBC AUTO-ENTMCNC: 30.4 G/DL (ref 31.4–37.4)
MCV RBC AUTO: 93 FL (ref 82–98)
PLATELET # BLD AUTO: 543 THOUSANDS/UL (ref 149–390)
PMV BLD AUTO: 9.2 FL (ref 8.9–12.7)
POTASSIUM SERPL-SCNC: 3.4 MMOL/L (ref 3.5–5.3)
RBC # BLD AUTO: 3.31 MILLION/UL (ref 3.81–5.12)
SODIUM SERPL-SCNC: 141 MMOL/L (ref 136–145)
WBC # BLD AUTO: 17.98 THOUSAND/UL (ref 4.31–10.16)

## 2018-02-22 PROCEDURE — 71260 CT THORAX DX C+: CPT

## 2018-02-22 PROCEDURE — C9113 INJ PANTOPRAZOLE SODIUM, VIA: HCPCS | Performed by: NURSE PRACTITIONER

## 2018-02-22 PROCEDURE — 99024 POSTOP FOLLOW-UP VISIT: CPT | Performed by: SURGERY

## 2018-02-22 PROCEDURE — 85027 COMPLETE CBC AUTOMATED: CPT | Performed by: PHYSICIAN ASSISTANT

## 2018-02-22 PROCEDURE — 99231 SBSQ HOSP IP/OBS SF/LOW 25: CPT | Performed by: THORACIC SURGERY (CARDIOTHORACIC VASCULAR SURGERY)

## 2018-02-22 PROCEDURE — 80048 BASIC METABOLIC PNL TOTAL CA: CPT | Performed by: PHYSICIAN ASSISTANT

## 2018-02-22 PROCEDURE — 99233 SBSQ HOSP IP/OBS HIGH 50: CPT | Performed by: INTERNAL MEDICINE

## 2018-02-22 PROCEDURE — 82948 REAGENT STRIP/BLOOD GLUCOSE: CPT

## 2018-02-22 PROCEDURE — 74177 CT ABD & PELVIS W/CONTRAST: CPT

## 2018-02-22 PROCEDURE — 94762 N-INVAS EAR/PLS OXIMTRY CONT: CPT

## 2018-02-22 PROCEDURE — 83735 ASSAY OF MAGNESIUM: CPT | Performed by: PHYSICIAN ASSISTANT

## 2018-02-22 RX ORDER — BISACODYL 10 MG
10 SUPPOSITORY, RECTAL RECTAL DAILY
Status: DISCONTINUED | OUTPATIENT
Start: 2018-02-22 | End: 2018-03-09 | Stop reason: HOSPADM

## 2018-02-22 RX ORDER — POTASSIUM CHLORIDE 29.8 MG/ML
40 INJECTION INTRAVENOUS ONCE
Status: DISCONTINUED | OUTPATIENT
Start: 2018-02-22 | End: 2018-02-22

## 2018-02-22 RX ADMIN — HYDROMORPHONE HYDROCHLORIDE 1 MG: 1 INJECTION, SOLUTION INTRAMUSCULAR; INTRAVENOUS; SUBCUTANEOUS at 03:02

## 2018-02-22 RX ADMIN — ONDANSETRON 4 MG: 2 INJECTION INTRAMUSCULAR; INTRAVENOUS at 04:56

## 2018-02-22 RX ADMIN — METOPROLOL TARTRATE 10 MG: 1 INJECTION, SOLUTION INTRAVENOUS at 18:37

## 2018-02-22 RX ADMIN — Medication 3 G: at 00:19

## 2018-02-22 RX ADMIN — METOPROLOL TARTRATE 10 MG: 1 INJECTION, SOLUTION INTRAVENOUS at 00:19

## 2018-02-22 RX ADMIN — INSULIN LISPRO 1 UNITS: 100 INJECTION, SOLUTION INTRAVENOUS; SUBCUTANEOUS at 12:14

## 2018-02-22 RX ADMIN — INSULIN LISPRO 1 UNITS: 100 INJECTION, SOLUTION INTRAVENOUS; SUBCUTANEOUS at 00:19

## 2018-02-22 RX ADMIN — HYDROMORPHONE HYDROCHLORIDE 1 MG: 1 INJECTION, SOLUTION INTRAMUSCULAR; INTRAVENOUS; SUBCUTANEOUS at 06:16

## 2018-02-22 RX ADMIN — Medication 3 G: at 05:17

## 2018-02-22 RX ADMIN — POTASSIUM CHLORIDE 20 MEQ: 2 INJECTION, SOLUTION, CONCENTRATE INTRAVENOUS at 22:56

## 2018-02-22 RX ADMIN — Medication 3 G: at 12:14

## 2018-02-22 RX ADMIN — INSULIN LISPRO 1 UNITS: 100 INJECTION, SOLUTION INTRAVENOUS; SUBCUTANEOUS at 18:12

## 2018-02-22 RX ADMIN — HYDROMORPHONE HYDROCHLORIDE 0.5 MG: 1 INJECTION, SOLUTION INTRAMUSCULAR; INTRAVENOUS; SUBCUTANEOUS at 05:00

## 2018-02-22 RX ADMIN — HEPARIN SODIUM 5000 UNITS: 5000 INJECTION, SOLUTION INTRAVENOUS; SUBCUTANEOUS at 10:39

## 2018-02-22 RX ADMIN — HYDROMORPHONE HYDROCHLORIDE 1 MG: 1 INJECTION, SOLUTION INTRAMUSCULAR; INTRAVENOUS; SUBCUTANEOUS at 18:27

## 2018-02-22 RX ADMIN — ONDANSETRON 4 MG: 2 INJECTION INTRAMUSCULAR; INTRAVENOUS at 21:07

## 2018-02-22 RX ADMIN — HYDROMORPHONE HYDROCHLORIDE 1 MG: 1 INJECTION, SOLUTION INTRAMUSCULAR; INTRAVENOUS; SUBCUTANEOUS at 14:05

## 2018-02-22 RX ADMIN — METOPROLOL TARTRATE 10 MG: 1 INJECTION, SOLUTION INTRAVENOUS at 14:06

## 2018-02-22 RX ADMIN — PANTOPRAZOLE SODIUM 40 MG: 40 INJECTION, POWDER, FOR SOLUTION INTRAVENOUS at 10:39

## 2018-02-22 RX ADMIN — POTASSIUM CHLORIDE 20 MEQ: 2 INJECTION, SOLUTION, CONCENTRATE INTRAVENOUS at 14:05

## 2018-02-22 RX ADMIN — Medication 3 G: at 18:37

## 2018-02-22 RX ADMIN — ONDANSETRON 4 MG: 2 INJECTION INTRAMUSCULAR; INTRAVENOUS at 14:18

## 2018-02-22 RX ADMIN — POTASSIUM CHLORIDE 20 MEQ: 2 INJECTION, SOLUTION, CONCENTRATE INTRAVENOUS at 18:08

## 2018-02-22 RX ADMIN — HYDROMORPHONE HYDROCHLORIDE 0.5 MG: 1 INJECTION, SOLUTION INTRAMUSCULAR; INTRAVENOUS; SUBCUTANEOUS at 00:19

## 2018-02-22 RX ADMIN — HEPARIN SODIUM 5000 UNITS: 5000 INJECTION, SOLUTION INTRAVENOUS; SUBCUTANEOUS at 22:21

## 2018-02-22 RX ADMIN — ONDANSETRON 4 MG: 2 INJECTION INTRAMUSCULAR; INTRAVENOUS at 10:52

## 2018-02-22 RX ADMIN — METOPROLOL TARTRATE 10 MG: 1 INJECTION, SOLUTION INTRAVENOUS at 05:07

## 2018-02-22 RX ADMIN — FLUCONAZOLE 400 MG: 2 INJECTION INTRAVENOUS at 18:27

## 2018-02-22 RX ADMIN — BISACODYL 10 MG: 10 SUPPOSITORY RECTAL at 10:39

## 2018-02-22 RX ADMIN — IOHEXOL 100 ML: 350 INJECTION, SOLUTION INTRAVENOUS at 13:30

## 2018-02-22 RX ADMIN — HYDROMORPHONE HYDROCHLORIDE 1 MG: 1 INJECTION, SOLUTION INTRAMUSCULAR; INTRAVENOUS; SUBCUTANEOUS at 10:39

## 2018-02-22 RX ADMIN — INSULIN LISPRO 1 UNITS: 100 INJECTION, SOLUTION INTRAVENOUS; SUBCUTANEOUS at 05:05

## 2018-02-22 RX ADMIN — HYDROMORPHONE HYDROCHLORIDE 1 MG: 1 INJECTION, SOLUTION INTRAMUSCULAR; INTRAVENOUS; SUBCUTANEOUS at 21:07

## 2018-02-22 NOTE — PROGRESS NOTES
Progress Note - Infectious Disease   Birmingham Foots 36 y o  female MRN: [id number]  Unit/Bed#: [facility] 834-01 Encounter: [id number]      Impression/Recommendations:  1   Intra-abdominal/pelvic abscesses   Patient is status post multiple abdominal washouts   She has multiple drains in place   She is status post placement of drainage in a new perisplenic collection   She is clinically well and systemically stable  Operative cultures consistently growing  only ampicillin susceptible Enterococcus   A single culture also grew Saccharomyces, most likely colonization   Patient had a new drain placed in left abdomen on 02/16   Culture this drainage is growing Enterococcus again, with no other pathogens   WBC decreasing again since this last drainage but increased again over last 48 hours    Continue with IV Unasyn/fluconazole  Continue drainage  Treat x 14 days from last drainage, another 8 days    Hopefully, we can transition to p  o  antibiotic in the next 1-2 days      2   Possible infected  shunt   Given presence of tip of  shunt in peritoneal space, there is high probability of  shunt infection   Fortunately, patient has no symptoms concerning for meningitis   She has neurological deficits   She is status post tapping [facility]  shunt at [facility]   CSF culture there had no growth but patient had prior antibiotic  Curtis Chain will go ahead and treat her for possible/presumptive  shunt infection   Patient is now status post  shunt resection   CSF culture here also negative  Kailash Mohan completed 2 weeks of antibiotic after shunt removal     No further antibiotic needed for this      3   Gastric perforation, status post repair, with persistent leak   She is now status post placement of esophageal stent   Unfortunately, repeat abdomen/pelvis CT showed recurrent leak with new perisplenic collection   She is now status post placement of duodenal feeding tube and drainage of the perisplenic collection   Unfortunately, barium swallow shows persistent esophageal leak   Patient is tolerating enteral feeding via feeding tube in duodenum  Monitor for recurrent leak      4   Bilateral pleural effusion, status post chest tube placement   This is most likely sympathetic effusion, secondary to intra-abdominal abscesses   Chest tubes have been removed   Patient remains comfortable      5  Recurrent leukocytosis   WBC decreasing again after last drainage, but remains elevated  Plan for repeat abdomen/pelvis CT today noted  Antibiotic plan as in above  Monitor WBC and temperature closely     Follow-up on abdomen/pelvis CT      6   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   No clinical signs of cellulitis   Patient has no headache    Wound is now sutured   Drainage/leakage appears to be resolving  Monitor      Discussed with the patient in detail regarding above plan      Antibiotics:  Unasyn  Fluconazole # 24  POD # 28  Post/drainage # 6     Subjective:  Patient is comfortable  Abdominal pain stable, well controlled     Temperature stays down   No chills  She is tolerating antibiotics well   No nausea, vomiting or diarrhea  She is tolerating tube feed well  Objective:  Vitals:  HR:  [] 106  Resp:  [18-20] 20  BP: (133-170)/(73-91) 146/85  SpO2:  [93 %-95 %] 93 %  Temp (24hrs), Av 4 °F (36 9 °C), Min:98 2 °F (36 8 °C), Max:98 8 °F (37 1 °C)  Current: Temperature: 98 2 °F (36 8 °C)    Physical Exam:     General: Awake, alert, cooperative, no distress  Head:  Incision healing  No drainage  No erythema/warmth  No tenderness  Chest:  Incision healing  No purulence  No erythema  No fluctuance  No tenderness  Lungs: Expansion symmetric, no rales, no wheezing, respirations unlabored  Heart[de-identified]  Regular rate and rhythm, S1 and S2 normal, no murmur  Abdomen: Soft, mildly distended, mild diffuse tenderness, bowel sounds active all four quadrants,        no masses, no organomegaly  Incision clean, healing    Drains seropurulent  Extremities: Stable edema  No erythema/warmth  No ulcer  Nontender to palpation  Skin:  No rash  Invasive Devices     Peripherally Inserted Central Catheter Line            PICC Line 95/51/40 Right Basilic 32 days          Drain            Closed/Suction Drain Left Back Bulb 12 Fr  22 days    NG/OG/Enteral Tube Enteral Feeding Tube Right nares 22 days    Closed/Suction Drain Midline;Superior Abdomen Other (Comment) 10 Fr  5 days                Labs studies:   I have personally reviewed pertinent labs  Results from last 7 days  Lab Units 02/22/18  0610 02/21/18  0505 02/19/18  0456   SODIUM mmol/L 141 144 142   POTASSIUM mmol/L 3 4* 3 2* 3 4*   CHLORIDE mmol/L 101 103 101   CO2 mmol/L 37* 35* 35*   ANION GAP mmol/L 3* 6 6   BUN mg/dL 15 14 15   CREATININE mg/dL 0 37* 0 36* 0 32*   EGFR ml/min/1 73sq m 131 132 138   GLUCOSE RANDOM mg/dL 167* 175* 177*   CALCIUM mg/dL 9 8 9 3 9 4   AST U/L  --  23  --    ALT U/L  --  31  --    ALK PHOS U/L  --  83  --    TOTAL PROTEIN g/dL  --  5 8*  --    BILIRUBIN TOTAL mg/dL  --  0 22  --        Results from last 7 days  Lab Units 02/22/18  0610 02/21/18  0505 02/19/18  0456   WBC Thousand/uL 17 98* 17 39* 18 79*   HEMOGLOBIN g/dL 9 4* 8 8* 9 3*   PLATELETS Thousands/uL 543* 355 499*           Imaging Studies:   I have personally reviewed pertinent imaging study reports and images in PACS  EKG, Pathology, and Other Studies:   I have personally reviewed pertinent reports

## 2018-02-22 NOTE — PROGRESS NOTES
Progress Note - General Surgery   Hayden Robison 36 y o  female MRN: [id number]  Unit/Bed#: [facility] 834-01 Encounter: [id number]    Assessment:  36y o -year-old female with gastric perforation s/p hiatal hernia repair at outside hospital, s/p esophageal stent placement, exploratory laparotomy and washout  Has had persistent esophageal/gastric leak on imaging  Had bilateral pleural effusions which were drained  Perisplenic fluid collection was drained  -wBC (17)  -afebrile  -tolerating tube feeds  -constipated  -perisplenic drain 210ml-purulent with quick collection of air   -midline drain 50ml- green    Plan:  Continue NPO w/ TF  Continue antibiotics per ID- unasyn/fluconazole-8 more days  Disop planning, needs rehab  -medically clear for discharge  -continue suppository    Subjective/Objective   Chief Complaint: None    Subjective: Denies complaint    Objective:     Blood pressure 133/73, pulse (!) 107, temperature 98 4 °F (36 9 °C), temperature source Oral, resp  rate 18, height 5' 4" (1 626 m), weight 59 6 kg (131 lb 6 4 oz), SpO2 95 %, not currently breastfeeding  ,Body mass index is 22 55 kg/m²        Intake/Output Summary (Last 24 hours) at 02/22/18 5155  Last data filed at 02/22/18 7183   Gross per 24 hour   Intake              795 ml   Output             3710 ml   Net            -2915 ml       Invasive Devices     Peripherally Inserted Central Catheter Line            PICC Line 71/70/31 Right Basilic 32 days          Drain            Closed/Suction Drain Left Back Bulb 12 Fr  21 days    NG/OG/Enteral Tube Enteral Feeding Tube Right nares 21 days    Closed/Suction Drain Midline;Superior Abdomen Other (Comment) 10 Fr  5 days                Physical Exam:   Gen: A&O, NAD  Cardio: RRR  Lungs: CTAB  Abd: Soft, non distended, minimally tender,   Midline drain bilious tinged  L drain purulent with air   Lab, Imaging and other studies:  CBC:   No results found for: WBC, HGB, HCT, MCV, PLT, ADJUSTEDWBC, MCH, MCHC, RDW, MPV, NRBC, CMP:   No results found for: NA, K, CL, CO2, ANIONGAP, BUN, CREATININE, GLUCOSE, CALCIUM, AST, ALT, ALKPHOS, PROT, ALBUMIN, BILITOT, EGFR  VTE Pharmacologic Prophylaxis: Heparin  VTE Mechanical Prophylaxis: sequential compression device

## 2018-02-22 NOTE — OCCUPATIONAL THERAPY NOTE
Occupational Therapy  Attempt see pt this date however pt is off floor at cat scan and unavailable at this time  Continue see pt as able  Follow with current POC    Afai Marinelli

## 2018-02-22 NOTE — PROGRESS NOTES
The acute care surgery service asked me to re-evaluate the patient to see if her esophageal bill stent could be repositioned  The patient likely has a gastric perforation in the cardia close to the gastroesophageal junction  A previous stent was placed but oral contrast leaked around it and into the abdomen  She has subsequently had pigtail catheters placed near this site with control of her sepsis  She does have a persistent leukocytosis  I, personally, discussed the risks and benefits associated with a general anesthetic, upper endoscopy and stent removal to better evaluate the perforation  If the perforation is amenable, replacement of a stent more proximally may help occlude the perforation better  The stent is at risk for migrating distally again  Her nasal jejunal feeding tube would also need to be removed in order to perform this  The patient would like to proceed with the procedure tomorrow

## 2018-02-23 ENCOUNTER — APPOINTMENT (INPATIENT)
Dept: RADIOLOGY | Facility: HOSPITAL | Age: 43
DRG: 711 | End: 2018-02-23
Payer: COMMERCIAL

## 2018-02-23 ENCOUNTER — ANESTHESIA (INPATIENT)
Dept: PERIOP | Facility: HOSPITAL | Age: 43
DRG: 711 | End: 2018-02-23
Payer: COMMERCIAL

## 2018-02-23 ENCOUNTER — ANESTHESIA EVENT (INPATIENT)
Dept: PERIOP | Facility: HOSPITAL | Age: 43
DRG: 711 | End: 2018-02-23
Payer: COMMERCIAL

## 2018-02-23 ENCOUNTER — APPOINTMENT (INPATIENT)
Dept: RADIOLOGY | Facility: HOSPITAL | Age: 43
DRG: 711 | End: 2018-02-23
Attending: THORACIC SURGERY (CARDIOTHORACIC VASCULAR SURGERY)
Payer: COMMERCIAL

## 2018-02-23 PROBLEM — K91.89 ESOPHAGEAL ANASTOMOTIC LEAK: Status: ACTIVE | Noted: 2018-01-24

## 2018-02-23 LAB
ANION GAP SERPL CALCULATED.3IONS-SCNC: 5 MMOL/L (ref 4–13)
BASOPHILS # BLD AUTO: 0.03 THOUSANDS/ΜL (ref 0–0.1)
BASOPHILS NFR BLD AUTO: 0 % (ref 0–1)
BUN SERPL-MCNC: 12 MG/DL (ref 5–25)
CALCIUM SERPL-MCNC: 10.4 MG/DL (ref 8.3–10.1)
CHLORIDE SERPL-SCNC: 101 MMOL/L (ref 100–108)
CO2 SERPL-SCNC: 35 MMOL/L (ref 21–32)
CREAT SERPL-MCNC: 0.34 MG/DL (ref 0.6–1.3)
EOSINOPHIL # BLD AUTO: 0.44 THOUSAND/ΜL (ref 0–0.61)
EOSINOPHIL NFR BLD AUTO: 2 % (ref 0–6)
ERYTHROCYTE [DISTWIDTH] IN BLOOD BY AUTOMATED COUNT: 16.2 % (ref 11.6–15.1)
GFR SERPL CREATININE-BSD FRML MDRD: 135 ML/MIN/1.73SQ M
GLUCOSE SERPL-MCNC: 119 MG/DL (ref 65–140)
GLUCOSE SERPL-MCNC: 137 MG/DL (ref 65–140)
GLUCOSE SERPL-MCNC: 161 MG/DL (ref 65–140)
HCT VFR BLD AUTO: 31.8 % (ref 34.8–46.1)
HGB BLD-MCNC: 9.7 G/DL (ref 11.5–15.4)
LYMPHOCYTES # BLD AUTO: 1.35 THOUSANDS/ΜL (ref 0.6–4.47)
LYMPHOCYTES NFR BLD AUTO: 7 % (ref 14–44)
MAGNESIUM SERPL-MCNC: 2.1 MG/DL (ref 1.6–2.6)
MCH RBC QN AUTO: 28.2 PG (ref 26.8–34.3)
MCHC RBC AUTO-ENTMCNC: 30.5 G/DL (ref 31.4–37.4)
MCV RBC AUTO: 92 FL (ref 82–98)
MONOCYTES # BLD AUTO: 0.83 THOUSAND/ΜL (ref 0.17–1.22)
MONOCYTES NFR BLD AUTO: 4 % (ref 4–12)
NEUTROPHILS # BLD AUTO: 16.05 THOUSANDS/ΜL (ref 1.85–7.62)
NEUTS SEG NFR BLD AUTO: 87 % (ref 43–75)
NRBC BLD AUTO-RTO: 0 /100 WBCS
PLATELET # BLD AUTO: 458 THOUSANDS/UL (ref 149–390)
PMV BLD AUTO: 9.3 FL (ref 8.9–12.7)
POTASSIUM SERPL-SCNC: 3.8 MMOL/L (ref 3.5–5.3)
RBC # BLD AUTO: 3.44 MILLION/UL (ref 3.81–5.12)
SODIUM SERPL-SCNC: 141 MMOL/L (ref 136–145)
WBC # BLD AUTO: 18.84 THOUSAND/UL (ref 4.31–10.16)

## 2018-02-23 PROCEDURE — 99024 POSTOP FOLLOW-UP VISIT: CPT | Performed by: SURGERY

## 2018-02-23 PROCEDURE — 82948 REAGENT STRIP/BLOOD GLUCOSE: CPT

## 2018-02-23 PROCEDURE — 99233 SBSQ HOSP IP/OBS HIGH 50: CPT | Performed by: INTERNAL MEDICINE

## 2018-02-23 PROCEDURE — 74220 X-RAY XM ESOPHAGUS 1CNTRST: CPT

## 2018-02-23 PROCEDURE — C1769 GUIDE WIRE: HCPCS

## 2018-02-23 PROCEDURE — 71045 X-RAY EXAM CHEST 1 VIEW: CPT

## 2018-02-23 PROCEDURE — 74360 X-RAY GUIDE GI DILATION: CPT | Performed by: THORACIC SURGERY (CARDIOTHORACIC VASCULAR SURGERY)

## 2018-02-23 PROCEDURE — 83735 ASSAY OF MAGNESIUM: CPT | Performed by: SURGERY

## 2018-02-23 PROCEDURE — 0DP58DZ REMOVAL OF INTRALUMINAL DEVICE FROM ESOPHAGUS, VIA NATURAL OR ARTIFICIAL OPENING ENDOSCOPIC: ICD-10-PCS | Performed by: THORACIC SURGERY (CARDIOTHORACIC VASCULAR SURGERY)

## 2018-02-23 PROCEDURE — 49423 EXCHANGE DRAINAGE CATHETER: CPT

## 2018-02-23 PROCEDURE — 0D20XUZ CHANGE FEEDING DEVICE IN UPPER INTESTINAL TRACT, EXTERNAL APPROACH: ICD-10-PCS | Performed by: RADIOLOGY

## 2018-02-23 PROCEDURE — 43266 EGD ENDOSCOPIC STENT PLACE: CPT | Performed by: THORACIC SURGERY (CARDIOTHORACIC VASCULAR SURGERY)

## 2018-02-23 PROCEDURE — C1769 GUIDE WIRE: HCPCS | Performed by: THORACIC SURGERY (CARDIOTHORACIC VASCULAR SURGERY)

## 2018-02-23 PROCEDURE — 75984 XRAY CONTROL CATHETER CHANGE: CPT

## 2018-02-23 PROCEDURE — 75984 XRAY CONTROL CATHETER CHANGE: CPT | Performed by: RADIOLOGY

## 2018-02-23 PROCEDURE — 85025 COMPLETE CBC W/AUTO DIFF WBC: CPT | Performed by: SURGERY

## 2018-02-23 PROCEDURE — 43247 EGD REMOVE FOREIGN BODY: CPT | Performed by: THORACIC SURGERY (CARDIOTHORACIC VASCULAR SURGERY)

## 2018-02-23 PROCEDURE — 74340 X-RAY GUIDE FOR GI TUBE: CPT | Performed by: RADIOLOGY

## 2018-02-23 PROCEDURE — C9113 INJ PANTOPRAZOLE SODIUM, VIA: HCPCS | Performed by: NURSE PRACTITIONER

## 2018-02-23 PROCEDURE — C1874 STENT, COATED/COV W/DEL SYS: HCPCS | Performed by: THORACIC SURGERY (CARDIOTHORACIC VASCULAR SURGERY)

## 2018-02-23 PROCEDURE — 0D728DZ DILATION OF MIDDLE ESOPHAGUS WITH INTRALUMINAL DEVICE, VIA NATURAL OR ARTIFICIAL OPENING ENDOSCOPIC: ICD-10-PCS | Performed by: THORACIC SURGERY (CARDIOTHORACIC VASCULAR SURGERY)

## 2018-02-23 PROCEDURE — 0D738DZ DILATION OF LOWER ESOPHAGUS WITH INTRALUMINAL DEVICE, VIA NATURAL OR ARTIFICIAL OPENING ENDOSCOPIC: ICD-10-PCS | Performed by: THORACIC SURGERY (CARDIOTHORACIC VASCULAR SURGERY)

## 2018-02-23 PROCEDURE — 49423 EXCHANGE DRAINAGE CATHETER: CPT | Performed by: RADIOLOGY

## 2018-02-23 PROCEDURE — 80048 BASIC METABOLIC PNL TOTAL CA: CPT | Performed by: SURGERY

## 2018-02-23 PROCEDURE — 44500 INTRO GASTROINTESTINAL TUBE: CPT | Performed by: RADIOLOGY

## 2018-02-23 PROCEDURE — C1729 CATH, DRAINAGE: HCPCS

## 2018-02-23 RX ORDER — FENTANYL CITRATE 50 UG/ML
INJECTION, SOLUTION INTRAMUSCULAR; INTRAVENOUS AS NEEDED
Status: DISCONTINUED | OUTPATIENT
Start: 2018-02-23 | End: 2018-02-23 | Stop reason: SURG

## 2018-02-23 RX ORDER — SODIUM CHLORIDE, SODIUM LACTATE, POTASSIUM CHLORIDE, CALCIUM CHLORIDE 600; 310; 30; 20 MG/100ML; MG/100ML; MG/100ML; MG/100ML
INJECTION, SOLUTION INTRAVENOUS CONTINUOUS PRN
Status: DISCONTINUED | OUTPATIENT
Start: 2018-02-23 | End: 2018-02-23 | Stop reason: SURG

## 2018-02-23 RX ORDER — MAGNESIUM HYDROXIDE 1200 MG/15ML
LIQUID ORAL AS NEEDED
Status: DISCONTINUED | OUTPATIENT
Start: 2018-02-23 | End: 2018-02-23 | Stop reason: HOSPADM

## 2018-02-23 RX ORDER — SUCCINYLCHOLINE/SOD CL,ISO/PF 100 MG/5ML
SYRINGE (ML) INTRAVENOUS AS NEEDED
Status: DISCONTINUED | OUTPATIENT
Start: 2018-02-23 | End: 2018-02-23 | Stop reason: SURG

## 2018-02-23 RX ORDER — ONDANSETRON 2 MG/ML
INJECTION INTRAMUSCULAR; INTRAVENOUS AS NEEDED
Status: DISCONTINUED | OUTPATIENT
Start: 2018-02-23 | End: 2018-02-23 | Stop reason: SURG

## 2018-02-23 RX ORDER — SODIUM CHLORIDE, SODIUM LACTATE, POTASSIUM CHLORIDE, CALCIUM CHLORIDE 600; 310; 30; 20 MG/100ML; MG/100ML; MG/100ML; MG/100ML
100 INJECTION, SOLUTION INTRAVENOUS CONTINUOUS
Status: DISCONTINUED | OUTPATIENT
Start: 2018-02-23 | End: 2018-02-24

## 2018-02-23 RX ORDER — METOCLOPRAMIDE HYDROCHLORIDE 5 MG/ML
10 INJECTION INTRAMUSCULAR; INTRAVENOUS ONCE AS NEEDED
Status: CANCELLED | OUTPATIENT
Start: 2018-02-23

## 2018-02-23 RX ORDER — ONDANSETRON 2 MG/ML
4 INJECTION INTRAMUSCULAR; INTRAVENOUS ONCE AS NEEDED
Status: DISCONTINUED | OUTPATIENT
Start: 2018-02-23 | End: 2018-02-23 | Stop reason: HOSPADM

## 2018-02-23 RX ORDER — FENTANYL CITRATE/PF 50 MCG/ML
25 SYRINGE (ML) INJECTION
Status: DISCONTINUED | OUTPATIENT
Start: 2018-02-23 | End: 2018-02-23 | Stop reason: HOSPADM

## 2018-02-23 RX ORDER — PROPOFOL 10 MG/ML
INJECTION, EMULSION INTRAVENOUS AS NEEDED
Status: DISCONTINUED | OUTPATIENT
Start: 2018-02-23 | End: 2018-02-23 | Stop reason: SURG

## 2018-02-23 RX ORDER — METOCLOPRAMIDE HYDROCHLORIDE 5 MG/ML
INJECTION INTRAMUSCULAR; INTRAVENOUS AS NEEDED
Status: DISCONTINUED | OUTPATIENT
Start: 2018-02-23 | End: 2018-02-23 | Stop reason: SURG

## 2018-02-23 RX ORDER — SUCCINYLCHOLINE CHLORIDE 20 MG/ML
INJECTION INTRAMUSCULAR; INTRAVENOUS AS NEEDED
Status: DISCONTINUED | OUTPATIENT
Start: 2018-02-23 | End: 2018-02-23 | Stop reason: SURG

## 2018-02-23 RX ORDER — LIDOCAINE HYDROCHLORIDE 10 MG/ML
INJECTION, SOLUTION INFILTRATION; PERINEURAL AS NEEDED
Status: DISCONTINUED | OUTPATIENT
Start: 2018-02-23 | End: 2018-02-23 | Stop reason: SURG

## 2018-02-23 RX ADMIN — HEPARIN SODIUM 5000 UNITS: 5000 INJECTION, SOLUTION INTRAVENOUS; SUBCUTANEOUS at 11:38

## 2018-02-23 RX ADMIN — PROPOFOL 170 MG: 10 INJECTION, EMULSION INTRAVENOUS at 08:04

## 2018-02-23 RX ADMIN — METOPROLOL TARTRATE 10 MG: 1 INJECTION, SOLUTION INTRAVENOUS at 05:34

## 2018-02-23 RX ADMIN — LIDOCAINE HYDROCHLORIDE 100 MG: 20 INJECTION, SOLUTION INTRAVENOUS at 15:02

## 2018-02-23 RX ADMIN — Medication 3 G: at 01:00

## 2018-02-23 RX ADMIN — Medication 3 G: at 20:54

## 2018-02-23 RX ADMIN — IOHEXOL 30 ML: 240 INJECTION, SOLUTION INTRATHECAL; INTRAVASCULAR; INTRAVENOUS; ORAL at 16:41

## 2018-02-23 RX ADMIN — METOPROLOL TARTRATE 10 MG: 1 INJECTION, SOLUTION INTRAVENOUS at 18:24

## 2018-02-23 RX ADMIN — HEPARIN SODIUM 5000 UNITS: 5000 INJECTION, SOLUTION INTRAVENOUS; SUBCUTANEOUS at 20:55

## 2018-02-23 RX ADMIN — Medication 3 G: at 11:35

## 2018-02-23 RX ADMIN — LIDOCAINE HYDROCHLORIDE 50 MG: 10 INJECTION, SOLUTION INFILTRATION; PERINEURAL at 08:04

## 2018-02-23 RX ADMIN — HYDROMORPHONE HYDROCHLORIDE 1 MG: 1 INJECTION, SOLUTION INTRAMUSCULAR; INTRAVENOUS; SUBCUTANEOUS at 21:00

## 2018-02-23 RX ADMIN — HYDROMORPHONE HYDROCHLORIDE 1 MG: 1 INJECTION, SOLUTION INTRAMUSCULAR; INTRAVENOUS; SUBCUTANEOUS at 18:18

## 2018-02-23 RX ADMIN — SODIUM CHLORIDE, SODIUM LACTATE, POTASSIUM CHLORIDE, AND CALCIUM CHLORIDE 100 ML/HR: .6; .31; .03; .02 INJECTION, SOLUTION INTRAVENOUS at 20:54

## 2018-02-23 RX ADMIN — Medication 100 MG: at 15:02

## 2018-02-23 RX ADMIN — FENTANYL CITRATE 25 MCG: 50 INJECTION INTRAMUSCULAR; INTRAVENOUS at 16:54

## 2018-02-23 RX ADMIN — FENTANYL CITRATE 50 MCG: 50 INJECTION, SOLUTION INTRAMUSCULAR; INTRAVENOUS at 08:04

## 2018-02-23 RX ADMIN — FENTANYL CITRATE 25 MCG: 50 INJECTION, SOLUTION INTRAMUSCULAR; INTRAVENOUS at 16:20

## 2018-02-23 RX ADMIN — INSULIN LISPRO 1 UNITS: 100 INJECTION, SOLUTION INTRAVENOUS; SUBCUTANEOUS at 18:28

## 2018-02-23 RX ADMIN — METOPROLOL TARTRATE 10 MG: 1 INJECTION, SOLUTION INTRAVENOUS at 11:39

## 2018-02-23 RX ADMIN — SUCCINYLCHOLINE CHLORIDE 100 MG: 20 INJECTION, SOLUTION INTRAMUSCULAR; INTRAVENOUS at 08:04

## 2018-02-23 RX ADMIN — IOHEXOL 25 ML: 350 INJECTION, SOLUTION INTRAVENOUS at 12:05

## 2018-02-23 RX ADMIN — PANTOPRAZOLE SODIUM 40 MG: 40 INJECTION, POWDER, FOR SOLUTION INTRAVENOUS at 11:44

## 2018-02-23 RX ADMIN — DEXAMETHASONE SODIUM PHOSPHATE 10 MG: 10 INJECTION INTRAMUSCULAR; INTRAVENOUS at 08:15

## 2018-02-23 RX ADMIN — ONDANSETRON 4 MG: 2 INJECTION INTRAMUSCULAR; INTRAVENOUS at 04:26

## 2018-02-23 RX ADMIN — FLUCONAZOLE 400 MG: 2 INJECTION INTRAVENOUS at 18:20

## 2018-02-23 RX ADMIN — FENTANYL CITRATE 25 MCG: 50 INJECTION, SOLUTION INTRAMUSCULAR; INTRAVENOUS at 15:59

## 2018-02-23 RX ADMIN — METOPROLOL TARTRATE 10 MG: 1 INJECTION, SOLUTION INTRAVENOUS at 01:00

## 2018-02-23 RX ADMIN — HYDROMORPHONE HYDROCHLORIDE 1 MG: 1 INJECTION, SOLUTION INTRAMUSCULAR; INTRAVENOUS; SUBCUTANEOUS at 01:01

## 2018-02-23 RX ADMIN — SODIUM CHLORIDE, SODIUM LACTATE, POTASSIUM CHLORIDE, AND CALCIUM CHLORIDE: .6; .31; .03; .02 INJECTION, SOLUTION INTRAVENOUS at 14:57

## 2018-02-23 RX ADMIN — HYDROMORPHONE HYDROCHLORIDE 1 MG: 1 INJECTION, SOLUTION INTRAMUSCULAR; INTRAVENOUS; SUBCUTANEOUS at 05:33

## 2018-02-23 RX ADMIN — FENTANYL CITRATE 50 MCG: 50 INJECTION, SOLUTION INTRAMUSCULAR; INTRAVENOUS at 15:02

## 2018-02-23 RX ADMIN — METOCLOPRAMIDE 10 MG: 5 INJECTION, SOLUTION INTRAMUSCULAR; INTRAVENOUS at 16:26

## 2018-02-23 RX ADMIN — Medication 3 G: at 05:34

## 2018-02-23 RX ADMIN — PROPOFOL 200 MG: 10 INJECTION, EMULSION INTRAVENOUS at 15:02

## 2018-02-23 RX ADMIN — SODIUM CHLORIDE, SODIUM LACTATE, POTASSIUM CHLORIDE, AND CALCIUM CHLORIDE: .6; .31; .03; .02 INJECTION, SOLUTION INTRAVENOUS at 08:04

## 2018-02-23 RX ADMIN — ONDANSETRON 4 MG: 2 INJECTION INTRAMUSCULAR; INTRAVENOUS at 15:00

## 2018-02-23 RX ADMIN — ONDANSETRON 4 MG: 2 INJECTION INTRAMUSCULAR; INTRAVENOUS at 08:15

## 2018-02-23 NOTE — PROGRESS NOTES
Verified with Dr Janny Leung, patient does not need a post op chest x-ray, may transfer back to floor

## 2018-02-23 NOTE — OP NOTE
OPERATIVE REPORT  PATIENT NAME: Isidro García    :  1975  MRN: [id number]  Pt Location: BE OR ROOM 08    SURGERY DATE: 2018    Surgeon(s) and Role:     * Melinda Keita MD - Primary    Preop Diagnosis:  Gastric cardia leak    Post-Op Diagnosis Codes:  Gastric cardia leak    Procedure(s) (LRB):  ESOPHAGOGASTRODUODENOSCOPY (EGD) WITH ESOPHAGEAL STENT PLACEMENT (N/A)    Specimen(s):  * No specimens in log *    Estimated Blood Loss:   Minimal    Drains:  Closed/Suction Drain Left Back Bulb 12 Fr  (Active)   Site Description Healing 2018  9:15 AM   Dressing Status Open to air 2018  9:15 AM   Drainage Appearance Milky;Tan; Thick;Green 2018  9:15 AM   Status To bulb suction 2018  9:15 AM   Output (mL) 60 mL 2018  9:15 AM   Number of days: 23       Closed/Suction Drain Midline;Superior Abdomen Other (Comment) 10 Fr  (Active)   Site Description Healing 2018  9:15 AM   Dressing Status Open to air 2018  9:15 AM   Drainage Appearance Tan 2018  9:15 AM   Status To bulb suction 2018  9:15 AM   Output (mL) 20 mL 2018  1:53 PM   Number of days: 7       Anesthesia Type:   General    Operative Indications:  Gastric cardia leak  Ms Ana Clark has a hole in her gastric cardia near her GE junction and I stented this area earlier today  Postprocedure barium swallow demonstrated some contrast coming around the proximal end of the stent in exiting the leak  She is brought back to the operating room for having a 2nd stent placed within her for stent to try and compressed this area  Operative Findings:  Stent migrated a couple cm distally    Complications:   None    Procedure and Technique:  Ms Ana Clark was brought to the operating room placed in the supine position  After institution of adequate general anesthesia fiberoptic endoscopy was performed  The stent was in countered around 30 cm from the incisors and the stent was easily traversed    The distal end of the stent is approximately 3 or 4 cm proximal to the pylorus which seems a little further in then this morning  Using a wrist a forcep the stent back several cm proximally  There the two endoscopic clips that were visualized within the stomach that were removed as they were not attached to anything  A guidewire was placed through the endoscope and the endoscope was removed over the guidewire  Using fluoroscopy, a 23 mm by 105 mm covered [employer] esophageal stent was placed with its distal portion located just above the gastric cardia defect  Repeat endoscopy confirmed good positioning of the stents  The endoscope was removed and the interventional radiology team came into the room in order to interrogate 1 of the drains  , The patient was extubated and brought to the recovery unit in stable condition having tolerated the procedure well     I was present for the entire procedure and A qualified resident physician was not available    Patient Disposition:  PACU     SIGNATURE: Milagro Bartlett MD  DATE: February 23, 2018  TIME: 3:33 PM

## 2018-02-23 NOTE — ANESTHESIA PREPROCEDURE EVALUATION
Review of Systems/Medical History  Patient summary reviewed  Chart reviewed      Cardiovascular  EKG reviewed,   Comment: Echo reviewed,  Pulmonary  Smoker ,   Comment: Emphysema, b/l pleural effusions     GI/Hepatic    GERD well controlled,   Comment: dubhoff in place     Negative  ROS        Endo/Other  Negative endo/other ROS      GYN  Negative gynecology ROS          Hematology  Anemia ,     Musculoskeletal  Negative musculoskeletal ROS        Neurology  Negative neurology ROS      Psychology   Negative psychology ROS              Physical Exam    Airway    Mallampati score: II         Dental       Cardiovascular  Rhythm: regular, Rate: normal,     Pulmonary  Decreased breath sounds,     Other Findings        Anesthesia Plan  ASA Score- 3     Anesthesia Type- general with ASA Monitors  Additional Monitors:   Airway Plan:         Plan Factors-    Induction- intravenous  Postoperative Plan- Plan for postoperative opioid use  Planned trial extubation    Informed Consent- Anesthetic plan and risks discussed with patient  I personally reviewed this patient with the CRNA  Discussed and agreed on the Anesthesia Plan with the POLO Perez

## 2018-02-23 NOTE — ANESTHESIA PREPROCEDURE EVALUATION
Had Esophageal Stent Placement This AM under GA, Now for Replacement of Stent  Small Peritoneal Leak Present on Esophagram    Gastric leak   Acute peritonitis   Idiopathic intracranial hypertension   S/P  shunt    (ventriculoperitoneal) shunt status   Murmur, cardiac   Refusal of blood product   Gastroesophageal reflux disease   Choroidal nevus, right eye   Moderate protein-calorie malnutrition (HCC)   Esophageal anastomotic leak       Review of Systems/Medical History          Cardiovascular  EKG reviewed,   Comment: LEFT VENTRICLE: 5/71/26  Systolic function was normal  Ejection fraction was estimated to be 65 %  There were no regional wall motion abnormalities  No Valvular Disease Noted ,  Pulmonary  Not a smoker ,        GI/Hepatic    GERD ,             Endo/Other     GYN       Hematology   Musculoskeletal       Neurology   Psychology                Anesthesia Plan  ASA Score- 3     Anesthesia Type- general with ASA Monitors  Additional Monitors:   Airway Plan: ETT  Plan Factors-Patient not instructed to abstain from smoking on day of procedure  Patient did not smoke on day of surgery  Induction- intravenous  Postoperative Plan- Plan for postoperative opioid use  Informed Consent- Anesthetic plan and risks discussed with patient  I personally reviewed this patient with the CRNA  Discussed and agreed on the Anesthesia Plan with the CRNA               Lab Results   Component Value Date    GLUCOSE 119 02/23/2018    ALT 31 02/21/2018    AST 23 02/21/2018    BUN 12 02/23/2018    CALCIUM 10 4 (H) 02/23/2018     02/23/2018    CO2 35 (H) 02/23/2018    CREATININE 0 34 (L) 02/23/2018    INR 1 26 (H) 02/04/2018    HCT 31 8 (L) 02/23/2018    HGB 9 7 (L) 02/23/2018    PROT 5 8 (L) 02/21/2018    MG 2 1 02/23/2018    PHOS 3 7 02/09/2018     (H) 02/23/2018    K 3 8 02/23/2018     02/23/2018    TRIG 130 01/25/2018    WBC 18 84 (H) 02/23/2018

## 2018-02-23 NOTE — PROGRESS NOTES
Patient care rounds were completed with the patient's nurse today, Alissa Browne  We discussed the plan is to obtain an upper GI swallow study following the EGD and stent replacement in the operating room this morning  May consider starting an oral diet if the upper GI study demonstrates no leak  Patient also to have IR evaluation for possible drain repositioning or up-sizing; if there is a persistent leak on the upper GI study, a nasogastric feeding tube may have to be reinserted  Continue IV antibiotics per Infectious Disease through 03/02/2018 at least     We reviewed all of the invasive devices/lines/telemetry orders   - Continue right-sided PICC line for long-term IV antibiotics  Pain Assessment / Plan:  - Continue current analgesic regimen  Mobility Assessment / Plan:  - Continue PT and OT evaluation and treatment as indicated with activity as tolerated  Goals / Barriers for discharge:  - Awaiting further imaging study and IR evaluation as noted above  - Case management following  All questions and concerns were addressed  I spent greater than 20 minutes reviewing the plan with the patient and the nurse, and coordinating her care for the day      Ophelia Alfaro PA-C  2/23/2018 11:30 AM

## 2018-02-23 NOTE — PROGRESS NOTES
Progress Note - General Surgery   Michelle Melo 36 y o  female MRN: [id number]  Unit/Bed#: [facility] 834-01 Encounter: [id number]    Assessment:  36y o -year-old female with gastric perforation s/p hiatal hernia repair at outside hospital, s/p esophageal stent placement, exploratory laparotomy and washout  Has had persistent esophageal/gastric leak on imaging  Had bilateral pleural effusions which were drained  Perisplenic fluid collection was drained  -wBC (18 84)  -afebrile  -tolerating tube feeds  -small bowel movement yesterday  -perisplenic drain 240 purulent  -midline drain 100 green/purulent    Plan:  OR today for EGD possible stent replacement  NPO/TF held  Continue antibiotics per ID- unasyn/fluconazole, transition to PO next 1-2 days  Bowel regiment  Prn pain control  Disop planning, needs rehab      Subjective/Objective   Chief Complaint:     Subjective: CECILLE  Mild nausea  Mild abdominal pain, controlled  Objective:     Blood pressure 143/80, pulse (!) 106, temperature 98 8 °F (37 1 °C), temperature source Oral, resp  rate 20, height 5' 4" (1 626 m), weight 59 6 kg (131 lb 6 4 oz), SpO2 93 %, not currently breastfeeding  ,Body mass index is 22 55 kg/m²        Intake/Output Summary (Last 24 hours) at 02/23/18 0545  Last data filed at 02/23/18 0420   Gross per 24 hour   Intake             1255 ml   Output             3490 ml   Net            -2235 ml       Invasive Devices     Peripherally Inserted Central Catheter Line            PICC Line 60/97/34 Right Basilic 33 days          Drain            Closed/Suction Drain Left Back Bulb 12 Fr  22 days    NG/OG/Enteral Tube Enteral Feeding Tube Right nares 22 days    Closed/Suction Drain Midline;Superior Abdomen Other (Comment) 10 Fr  6 days                Physical Exam:   AAOX3  NAD  Soft, NT, ND  Midline drain 240ml green tinged, purulent  Back drain purulent 100ml  No c/c/e    Lab, Imaging and other studies:  CBC:   Lab Results   Component Value Date    WBC 18 84 (H) 02/23/2018    HGB 9 7 (L) 02/23/2018    HCT 31 8 (L) 02/23/2018    MCV 92 02/23/2018     (H) 02/23/2018    MCH 28 2 02/23/2018    MCHC 30 5 (L) 02/23/2018    RDW 16 2 (H) 02/23/2018    MPV 9 3 02/23/2018    NRBC 0 02/23/2018   , CMP:   Lab Results   Component Value Date     02/22/2018    K 3 4 (L) 02/22/2018     02/22/2018    CO2 37 (H) 02/22/2018    ANIONGAP 3 (L) 02/22/2018    BUN 15 02/22/2018    CREATININE 0 37 (L) 02/22/2018    GLUCOSE 167 (H) 02/22/2018    CALCIUM 9 8 02/22/2018    EGFR 131 02/22/2018     VTE Pharmacologic Prophylaxis: Heparin  VTE Mechanical Prophylaxis: sequential compression device

## 2018-02-23 NOTE — ANESTHESIA PREPROCEDURE EVALUATION
Review of Systems/Medical History          Cardiovascular   Pulmonary       GI/Hepatic    GERD ,             Endo/Other     GYN       Hematology  Anemia ,     Musculoskeletal       Neurology      Comment:  shunt Psychology           Physical Exam    Airway    Mallampati score: II         Dental   No notable dental hx     Cardiovascular      Pulmonary      Other Findings        Anesthesia Plan  ASA Score- 3     Anesthesia Type- general with ASA Monitors  Additional Monitors:   Airway Plan: ETT  Plan Factors-    Induction- intravenous  Postoperative Plan-     Informed Consent- Anesthetic plan and risks discussed with patient  I personally reviewed this patient with the CRNA  Discussed and agreed on the Anesthesia Plan with the CRNA  Samson Grant

## 2018-02-23 NOTE — PHYSICAL THERAPY NOTE
Physical Therapy Cancellation Note    PT ATTEMPTED TREATMENT SESSION HOWEVER PATIENT IS OFF FLOOR IN OR  PT WILL CONTINUE TO FOLLOW FOR FUTURE SKILLED SERVICES AS APPROPRIATE      Fransisca Apodaca, PT

## 2018-02-23 NOTE — PROCEDURES
Procedures  Interventional Radiology Procedure Note    PATIENT NAME: Michelle Melo  : 1975  MRN: [id number]    Procedure:  1  Left abdominal drain evaluation and exchange  2   Fluoroscopic placement of post pyloric feeding tube    Estimated Blood Loss:  None     Findings:  1  Left abdominal drain sightly malpositioned  Exchanged and repositioned  2   ND feeding tube placed without any difficulty with tip in the duodenum  Okay to use      Specimens: none    Complications:  None    Anesthesia:  Glenn Aburto MD     Date: 2018  Time: 4:32 PM

## 2018-02-23 NOTE — PERIOPERATIVE NURSING NOTE
VSS, pt denies pain or nausea, resting quietly, assessment unchanged, report called, no questions, pt transferred to floor via bed

## 2018-02-23 NOTE — OP NOTE
OPERATIVE REPORT  PATIENT NAME: Edilberto Joseph    :  1975  MRN: [id number]  Pt Location: BE OR ROOM 10    SURGERY DATE: 2018    Surgeon(s) and Role:     * Malgorzata Sanderson MD - Primary    Preop Diagnosis:  Gastric leak [K91 89]  Status post esophageal stenting     Post-Op Diagnosis Codes:     * Gastric leak [K91 89]  Status post esophageal stenting    Procedure(s) (LRB):  ESOPHAGOGASTRODUODENOSCOPY (EGD) WITH REMOVAL ESOPHAGEAL STENT  AND REPLACEMENT WITH 23mm X155mm WALLFLEX ESOPHAGEAL COVERED STENT (N/A)    Specimen(s):  * No specimens in log *    Estimated Blood Loss:   Minimal    Drains:  Closed/Suction Drain Left Back Bulb 12 Fr  (Active)   Site Description Healing 2018  3:50 PM   Dressing Status Open to air 2018  3:50 PM   Drainage Appearance Milky;Stark; Thick 2018  3:50 PM   Status Irrigated 2018  3:50 PM   Output (mL) 40 mL 2018  3:07 AM   Number of days: 23       Closed/Suction Drain Midline;Superior Abdomen Other (Comment) 10 Fr  (Active)   Site Description Healing 2018  3:50 PM   Dressing Status Open to air 2018  3:50 PM   Drainage Appearance Green; Tan 2018  3:50 PM   Status To bulb suction 2018  3:50 PM   Output (mL) 10 mL 2018  3:07 AM   Number of days: 7       NG/OG/Enteral Tube Enteral Feeding Tube Right nares (Active)   Placement Reverification X-ray 2018  6:50 PM   Site Assessment Clean;Dry; Intact 2018  3:00 AM   Enteral feeding tube secured at (cm) 95 Cm 2018  4:35 PM   Enteral feeding tube interventions Flushed 2018  7:00 AM   Status Tube feed infusing 2018  3:00 AM   Drainage Appearance None 2018  7:20 PM   Intake (mL) 425 mL 2018  6:54 AM   Output (mL) 0 mL 2018  2:53 PM   Number of days: 23       Anesthesia Type:   General    Operative Indications:  Gastric leak [K91 89]  Status post esophageal stenting   Ms Billy Soria has a gastric perforation in the cardia as a result of hiatal hernia surgery   She had had an esophageal stent placed previously but she remains with a leak  She is brought to the operating room for re-evaluation and potentially repeat stenting  Operative Findings:  Stent is above area of the perforation    Complications:   None    Procedure and Technique:  Ms Leroy Kan was brought to the operating room and placed in the supine position  After institution of adequate general anesthesia fiberoptic endoscopy is performed  The very proximal esophagus appears normal   The stent is in countered and the scope was placed through the stent  At the bottom of the stent there is a large amount of granulation tissue at its difficult to get the scope past this area  A rescue forcep is then placed through the scope and the stent is removed by grabbing the suture at the proximal area and withdrawing it  Repeat endoscopy then demonstrates that the underlying esophagus appears normal and that there is a small amount of whitish, likely necrotic tissue that is at the base of some of this heaped up granulation tissue  This is presumably the area of the perforation  Beyond the granulation tissue the gastric tube appears normal with normal mucosa  The tube is narrow enough that it appears as if a large esophageal stent would seat flush with the gastric tube potentially allowing sealing of the area of the perforation  Using fluoroscopy, a guidewire was passed through the scope to lie at the level of the pylorus  The pylorus itself appeared normal in the proximal duodenum was normal  The scope was removed over the guidewire  Under fluoroscopic guidance a 23 mm x 155 mm covered [employer] stent was placed with its distal margin down within the gastric tube  The proximal portion was up in the mid esophagus  There appeared to be excellent sealing on both the proximal and distal and on repeat endoscopy with the stent nicely open at the level of the mid gastric tube and good visualization of the pylorus   The scope was then removed, the patient was extubated, and brought to the recovery unit in stable condition having tolerated the procedure well  Sponge and instrument counts were correct     I was present for the entire procedure and A qualified resident physician was not available    Patient Disposition:  PACU                  SIGNATURE: Mary Duarte MD  DATE: February 23, 2018  TIME: 8:54 AM

## 2018-02-23 NOTE — PROGRESS NOTES
Progress Note - Thoracic Surgery   Zoila Ballard 36 y o  female MRN: [id number]  Unit/Bed#: [facility] 834-01 Encounter: [id number]    Assessment:  36y o -year-old female with gastric perforation s/p hiatal hernia repair at outside hospital, s/p esophageal stent placement, exploratory laparotomy and washout  Has had persistent esophageal/gastric leak on imaging  Had bilateral pleural effusions which were drained  Perisplenic fluid collection was drained  -wBC (18 84)  -afebrile  -tolerating tube feeds  -small bowel movement yesterday  -perisplenic drain 240 purulent  -midline drain 100 green/purulent    Plan:  NPO/TF held  OR today for EGD possible stent replacement  Continue antibiotics per ID- unasyn/fluconazole, transition to PO next 1-2 days  Bowel regiment  Disop planning, needs rehab      Subjective/Objective   Chief Complaint:     Subjective: CECILLE  Pain controlled, mild abdominal pain  Mild nausea  No vomiting  Objective:     Blood pressure 143/80, pulse (!) 106, temperature 98 8 °F (37 1 °C), temperature source Oral, resp  rate 20, height 5' 4" (1 626 m), weight 59 6 kg (131 lb 6 4 oz), SpO2 93 %, not currently breastfeeding  ,Body mass index is 22 55 kg/m²        Intake/Output Summary (Last 24 hours) at 02/23/18 0539  Last data filed at 02/23/18 0420   Gross per 24 hour   Intake             1255 ml   Output             3490 ml   Net            -2235 ml       Invasive Devices     Peripherally Inserted Central Catheter Line            PICC Line 32/30/18 Right Basilic 33 days          Drain            Closed/Suction Drain Left Back Bulb 12 Fr  22 days    NG/OG/Enteral Tube Enteral Feeding Tube Right nares 22 days    Closed/Suction Drain Midline;Superior Abdomen Other (Comment) 10 Fr  6 days                Physical Exam:   NAD  AAOX3  Normal respiratory effort  Soft, NT, ND  Midline drain 240ml green tinged, purulent  Back drain purulent 100ml     Lab, Imaging and other studies:  CBC:   Lab Results Component Value Date    WBC 18 84 (H) 02/23/2018    HGB 9 7 (L) 02/23/2018    HCT 31 8 (L) 02/23/2018    MCV 92 02/23/2018     (H) 02/23/2018    MCH 28 2 02/23/2018    MCHC 30 5 (L) 02/23/2018    RDW 16 2 (H) 02/23/2018    MPV 9 3 02/23/2018    NRBC 0 02/23/2018   , CMP:   Lab Results   Component Value Date     02/22/2018    K 3 4 (L) 02/22/2018     02/22/2018    CO2 37 (H) 02/22/2018    ANIONGAP 3 (L) 02/22/2018    BUN 15 02/22/2018    CREATININE 0 37 (L) 02/22/2018    GLUCOSE 167 (H) 02/22/2018    CALCIUM 9 8 02/22/2018    EGFR 131 02/22/2018     VTE Pharmacologic Prophylaxis: Heparin  VTE Mechanical Prophylaxis: sequential compression device

## 2018-02-23 NOTE — PROGRESS NOTES
Progress Note - Infectious Disease   Luis Bates 36 y o  female MRN: [id number]  Unit/Bed#: [facility] 834-01 Encounter: [id number]      Impression/Recommendations:  1   Intra-abdominal/pelvic abscesses   Patient is status post multiple abdominal washouts   She has multiple drains in place   She is status post placement of drainage in a new perisplenic collection   She is clinically well and systemically stable  Operative cultures consistently growing  only ampicillin susceptible Enterococcus   A single culture also grew Saccharomyces, most likely colonization   Patient had a new drain placed in left abdomen on 02/16   Culture this drainage is growing Enterococcus again, with no other pathogens   WBC still elevated but stable    Continue with IV Unasyn/fluconazole  Continue drainage  Treat x 14 days from last drainage, another 8 days      2   Gastric perforation, status post repair, with persistent leak   She is now status post placement of esophageal stent   Barium swallow from today showed persistent leak  Patient is status post esophageal stent exchange  Plan for additional stenting later today noted  Monitor for recurrent leak  Further esophageal stenting per thoracic surgery service      3   Persistent leukocytosis   WBC has improved but still remains i elevated   This is most likely related to persistent esophageal leaking  Will continue antibiotic  Will continue to monitor WBC  Plan for esophageal restenting noted  Antibiotic plan as in above    Monitor WBC and temperature closely        4   Possible infected  shunt   Given presence of tip of  shunt in peritoneal space, there is high probability of  shunt infection   Fortunately, patient has no symptoms concerning for meningitis   She has neurological deficits   She is status post tapping of  shunt at [facility]   CSF culture there had no growth but patient had prior antibiotic   I will go ahead and treat her for possible/presumptive  shunt infection   Patient is now status post  shunt resection   CSF culture here also negative  Marci Maravilla completed 2 weeks of antibiotic after shunt removal     No further antibiotic needed for this      5   Bilateral pleural effusion, status post chest tube placement   This is most likely sympathetic effusion, secondary to intra-abdominal abscesses   Chest tubes have been removed   Patient remains comfortable      6   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   No clinical signs of cellulitis   Patient has no headache    Wound is now sutured   Drainage/leakage appears to be resolving  Monitor      Discussed with the patient and her mother in detail regarding above plan  Discussed with surgery and thoracic surgery services      Antibiotics:  Unasyn  Fluconazole # 24  POD # 28  Post/drainage # 6     Subjective:  Patient is status post esophageal stent exchange  Feeding tube has been removed  Patient is comfortable  She is sleepy but arousable  Abdominal pain controlled     Temperature stays down   No chills  She is tolerating antibiotics well   No nausea, vomiting or diarrhea  Objective:  Vitals:  HR:  [] 98  Resp:  [11-28] 20  BP: (141-172)/(71-99) 172/97  SpO2:  [92 %-100 %] 93 %  Temp (24hrs), Av 2 °F (36 8 °C), Min:97 4 °F (36 3 °C), Max:98 8 °F (37 1 °C)  Current: Temperature: (!) 97 4 °F (36 3 °C)    Physical Exam:     General: Sleepy but arousable  Comfortable  Nontoxic  Lungs: Decreased breath sounds, no rales, no wheezing, respirations unlabored  Heart[de-identified]  Tachycardic with regular rhythm, S1 and S2 normal, no murmur  Abdomen: Soft, mildly distended, mild diffuse tenderness, bowel sounds active all four quadrants  Drains seropurulent  Extremities: Trace edema  No erythema/warmth  No ulcer  Nontender to palpation  Skin:  No rash       Invasive Devices     Peripherally Inserted Central Catheter Line            PICC Line  Right Basilic 33 days          Drain Closed/Suction Drain Left Back Bulb 12 Fr  23 days    Closed/Suction Drain Midline;Superior Abdomen Other (Comment) 10 Fr  6 days                Labs studies:   I have personally reviewed pertinent labs  Results from last 7 days  Lab Units 02/23/18  0454 02/22/18  0610 02/21/18  0505   SODIUM mmol/L 141 141 144   POTASSIUM mmol/L 3 8 3 4* 3 2*   CHLORIDE mmol/L 101 101 103   CO2 mmol/L 35* 37* 35*   ANION GAP mmol/L 5 3* 6   BUN mg/dL 12 15 14   CREATININE mg/dL 0 34* 0 37* 0 36*   EGFR ml/min/1 73sq m 135 131 132   GLUCOSE RANDOM mg/dL 119 167* 175*   CALCIUM mg/dL 10 4* 9 8 9 3   AST U/L  --   --  23   ALT U/L  --   --  31   ALK PHOS U/L  --   --  83   TOTAL PROTEIN g/dL  --   --  5 8*   BILIRUBIN TOTAL mg/dL  --   --  0 22       Results from last 7 days  Lab Units 02/23/18  0454 02/22/18  0610 02/21/18  0505   WBC Thousand/uL 18 84* 17 98* 17 39*   HEMOGLOBIN g/dL 9 7* 9 4* 8 8*   PLATELETS Thousands/uL 458* 543* 355           Imaging Studies:   I have personally reviewed pertinent imaging study reports and images in PACS  EKG, Pathology, and Other Studies:   I have personally reviewed pertinent reports

## 2018-02-23 NOTE — ANESTHESIA POSTPROCEDURE EVALUATION
Post-Op Assessment Note      CV Status:  Stable    Mental Status:  Alert and awake    Hydration Status:  Euvolemic    PONV Controlled:  Controlled    Airway Patency:  Patent    Post Op Vitals Reviewed: Yes          Staff: CRNA, Anesthesiologist           /78 (02/23/18 0852)    Temp 98 °F (36 7 °C) (02/23/18 0852)    Pulse 89 (02/23/18 0852)   Resp (!) 28 (02/23/18 0852)    SpO2 100 % (02/23/18 0852)

## 2018-02-23 NOTE — ANESTHESIA POSTPROCEDURE EVALUATION
Post-Op Assessment Note      CV Status:  Stable    Post-procedure mental status: arousable      Hydration Status:  Stable    PONV Controlled:  None    Airway Patency:  Patent    Post Op Vitals Reviewed: Yes          Staff: CRNA           /88 (02/23/18 1630)    Temp 99 4 °F (37 4 °C) (02/23/18 1630)    Pulse 94 (02/23/18 1630)   Resp 14 (02/23/18 1630)    SpO2 97 % (02/23/18 1630)    Postop VS in PACU noted above, SV non-obstructed on 3LNC

## 2018-02-24 ENCOUNTER — APPOINTMENT (INPATIENT)
Dept: RADIOLOGY | Facility: HOSPITAL | Age: 43
DRG: 711 | End: 2018-02-24
Payer: COMMERCIAL

## 2018-02-24 LAB
BASOPHILS # BLD AUTO: 0.01 THOUSANDS/ΜL (ref 0–0.1)
BASOPHILS NFR BLD AUTO: 0 % (ref 0–1)
EOSINOPHIL # BLD AUTO: 0.1 THOUSAND/ΜL (ref 0–0.61)
EOSINOPHIL NFR BLD AUTO: 1 % (ref 0–6)
ERYTHROCYTE [DISTWIDTH] IN BLOOD BY AUTOMATED COUNT: 16 % (ref 11.6–15.1)
GLUCOSE SERPL-MCNC: 109 MG/DL (ref 65–140)
GLUCOSE SERPL-MCNC: 111 MG/DL (ref 65–140)
GLUCOSE SERPL-MCNC: 119 MG/DL (ref 65–140)
GLUCOSE SERPL-MCNC: 178 MG/DL (ref 65–140)
GLUCOSE SERPL-MCNC: 95 MG/DL (ref 65–140)
HCT VFR BLD AUTO: 29.9 % (ref 34.8–46.1)
HGB BLD-MCNC: 9.4 G/DL (ref 11.5–15.4)
LYMPHOCYTES # BLD AUTO: 1.48 THOUSANDS/ΜL (ref 0.6–4.47)
LYMPHOCYTES NFR BLD AUTO: 10 % (ref 14–44)
MCH RBC QN AUTO: 28.8 PG (ref 26.8–34.3)
MCHC RBC AUTO-ENTMCNC: 31.4 G/DL (ref 31.4–37.4)
MCV RBC AUTO: 92 FL (ref 82–98)
MONOCYTES # BLD AUTO: 0.94 THOUSAND/ΜL (ref 0.17–1.22)
MONOCYTES NFR BLD AUTO: 7 % (ref 4–12)
NEUTROPHILS # BLD AUTO: 11.78 THOUSANDS/ΜL (ref 1.85–7.62)
NEUTS SEG NFR BLD AUTO: 82 % (ref 43–75)
NRBC BLD AUTO-RTO: 0 /100 WBCS
PLATELET # BLD AUTO: 500 THOUSANDS/UL (ref 149–390)
PMV BLD AUTO: 9.1 FL (ref 8.9–12.7)
RBC # BLD AUTO: 3.26 MILLION/UL (ref 3.81–5.12)
WBC # BLD AUTO: 14.43 THOUSAND/UL (ref 4.31–10.16)

## 2018-02-24 PROCEDURE — 85025 COMPLETE CBC W/AUTO DIFF WBC: CPT | Performed by: STUDENT IN AN ORGANIZED HEALTH CARE EDUCATION/TRAINING PROGRAM

## 2018-02-24 PROCEDURE — 99233 SBSQ HOSP IP/OBS HIGH 50: CPT | Performed by: INTERNAL MEDICINE

## 2018-02-24 PROCEDURE — C9113 INJ PANTOPRAZOLE SODIUM, VIA: HCPCS | Performed by: NURSE PRACTITIONER

## 2018-02-24 PROCEDURE — 71046 X-RAY EXAM CHEST 2 VIEWS: CPT

## 2018-02-24 PROCEDURE — 82948 REAGENT STRIP/BLOOD GLUCOSE: CPT

## 2018-02-24 RX ORDER — DEXTROSE, SODIUM CHLORIDE, AND POTASSIUM CHLORIDE 5; .45; .15 G/100ML; G/100ML; G/100ML
100 INJECTION INTRAVENOUS CONTINUOUS
Status: DISCONTINUED | OUTPATIENT
Start: 2018-02-24 | End: 2018-02-25

## 2018-02-24 RX ADMIN — Medication 3 G: at 17:56

## 2018-02-24 RX ADMIN — Medication 3 G: at 12:10

## 2018-02-24 RX ADMIN — HYDROMORPHONE HYDROCHLORIDE 1 MG: 1 INJECTION, SOLUTION INTRAMUSCULAR; INTRAVENOUS; SUBCUTANEOUS at 21:08

## 2018-02-24 RX ADMIN — ONDANSETRON 4 MG: 2 INJECTION INTRAMUSCULAR; INTRAVENOUS at 18:01

## 2018-02-24 RX ADMIN — SODIUM CHLORIDE, SODIUM LACTATE, POTASSIUM CHLORIDE, AND CALCIUM CHLORIDE 100 ML/HR: .6; .31; .03; .02 INJECTION, SOLUTION INTRAVENOUS at 16:13

## 2018-02-24 RX ADMIN — FLUCONAZOLE 400 MG: 2 INJECTION INTRAVENOUS at 16:13

## 2018-02-24 RX ADMIN — HYDROMORPHONE HYDROCHLORIDE 1 MG: 1 INJECTION, SOLUTION INTRAMUSCULAR; INTRAVENOUS; SUBCUTANEOUS at 00:25

## 2018-02-24 RX ADMIN — HYDROMORPHONE HYDROCHLORIDE 1 MG: 1 INJECTION, SOLUTION INTRAMUSCULAR; INTRAVENOUS; SUBCUTANEOUS at 17:57

## 2018-02-24 RX ADMIN — METOPROLOL TARTRATE 10 MG: 1 INJECTION, SOLUTION INTRAVENOUS at 12:10

## 2018-02-24 RX ADMIN — BISACODYL 10 MG: 10 SUPPOSITORY RECTAL at 08:48

## 2018-02-24 RX ADMIN — METOPROLOL TARTRATE 10 MG: 1 INJECTION, SOLUTION INTRAVENOUS at 05:07

## 2018-02-24 RX ADMIN — HYDROMORPHONE HYDROCHLORIDE 1 MG: 1 INJECTION, SOLUTION INTRAMUSCULAR; INTRAVENOUS; SUBCUTANEOUS at 14:25

## 2018-02-24 RX ADMIN — HEPARIN SODIUM 5000 UNITS: 5000 INJECTION, SOLUTION INTRAVENOUS; SUBCUTANEOUS at 21:12

## 2018-02-24 RX ADMIN — DEXTROSE, SODIUM CHLORIDE, AND POTASSIUM CHLORIDE 100 ML/HR: 5; .45; .15 INJECTION INTRAVENOUS at 19:41

## 2018-02-24 RX ADMIN — HYDROMORPHONE HYDROCHLORIDE 0.5 MG: 1 INJECTION, SOLUTION INTRAMUSCULAR; INTRAVENOUS; SUBCUTANEOUS at 05:50

## 2018-02-24 RX ADMIN — SODIUM CHLORIDE, SODIUM LACTATE, POTASSIUM CHLORIDE, AND CALCIUM CHLORIDE 100 ML/HR: .6; .31; .03; .02 INJECTION, SOLUTION INTRAVENOUS at 06:42

## 2018-02-24 RX ADMIN — ONDANSETRON 4 MG: 2 INJECTION INTRAMUSCULAR; INTRAVENOUS at 03:35

## 2018-02-24 RX ADMIN — HYDROMORPHONE HYDROCHLORIDE 1 MG: 1 INJECTION, SOLUTION INTRAMUSCULAR; INTRAVENOUS; SUBCUTANEOUS at 10:48

## 2018-02-24 RX ADMIN — METOPROLOL TARTRATE 10 MG: 1 INJECTION, SOLUTION INTRAVENOUS at 17:56

## 2018-02-24 RX ADMIN — Medication 3 G: at 00:18

## 2018-02-24 RX ADMIN — ONDANSETRON 4 MG: 2 INJECTION INTRAMUSCULAR; INTRAVENOUS at 09:00

## 2018-02-24 RX ADMIN — HEPARIN SODIUM 5000 UNITS: 5000 INJECTION, SOLUTION INTRAVENOUS; SUBCUTANEOUS at 08:47

## 2018-02-24 RX ADMIN — METOPROLOL TARTRATE 10 MG: 1 INJECTION, SOLUTION INTRAVENOUS at 00:18

## 2018-02-24 RX ADMIN — HYDROMORPHONE HYDROCHLORIDE 1 MG: 1 INJECTION, SOLUTION INTRAMUSCULAR; INTRAVENOUS; SUBCUTANEOUS at 03:34

## 2018-02-24 RX ADMIN — PANTOPRAZOLE SODIUM 40 MG: 40 INJECTION, POWDER, FOR SOLUTION INTRAVENOUS at 08:47

## 2018-02-24 RX ADMIN — ONDANSETRON 4 MG: 2 INJECTION INTRAMUSCULAR; INTRAVENOUS at 22:21

## 2018-02-24 RX ADMIN — Medication 3 G: at 05:07

## 2018-02-24 NOTE — PROGRESS NOTES
Spoke with Mary Najera from Surgery  Will not flush tubes due to unclear orders  Will clarify with day team which tubes should be flushed  Both ADENIKE drains currently set to bulb suction as per notes/orders

## 2018-02-24 NOTE — PROGRESS NOTES
Progress Note - General Surgery   Chalo Cano 36 y o  female MRN: [id number]  Unit/Bed#: [facility] 834-01 Encounter: [id number]    Assessment and Plan:  Patient Active Problem List   Diagnosis    Gastric leak    Acute peritonitis    Idiopathic intracranial hypertension    S/P  shunt     (ventriculoperitoneal) shunt status    Murmur, cardiac    Refusal of blood product    Gastroesophageal reflux disease    Choroidal nevus, right eye    Moderate protein-calorie malnutrition (HCC)    Esophageal anastomotic leak       36y o -year-old female with gastric perforation s/p hiatal hernia repair at outside hospital, s/p esophageal stent placement, exploratory laparotomy and washout  Has had persistent esophageal/gastric leak on imaging  Had bilateral pleural effusions which were drained  Perisplenic fluid collection was drained  Most recently with EGD & stent placement x 2         Plan:   NPO  Change fluid to D5 1/2 NS @ 100   Continue to tube feeds & trickle to goal   Wean oxygen   Continue ADENIKE drains to bulb sxn and monitor output   Continue antibiotics per ID- unasyn/fluconazole, transition to PO next 1-2 days  IV dilaudid P R N  for Analgesia  Encourage incentive spirometry use  Follow up on PA/Lateral CXR in am   SQ and Venodynes for DVT prophylaxis        Subjective:  Patient is complaining of some mid epigastric pain which is being well controlled with her pain medicaitons  She states she slept well  Objective:       Vitals   Vitals:    02/23/18 2000 02/23/18 2100 02/23/18 2300 02/24/18 0300   BP: (!) 172/104 167/98 164/87 144/85   BP Location: Left arm Left arm Right arm Left arm   Pulse: 91 87 97 95   Resp: (!) 24 22 17 18   Temp: 98 6 °F (37 °C) 98 5 °F (36 9 °C) 98 7 °F (37 1 °C) 98 7 °F (37 1 °C)   TempSrc: Oral Oral Oral Oral   SpO2: 100% 99% 99% 99%   Weight:       Height:         Temp  Min: 97 1 °F (36 2 °C)  Max: 100 5 °F (38 1 °C)  IBW: 54 7 kg   Body mass index is 24 48 kg/m²      I/O   I/O 02/22 0701 - 02/23 0700 02/23 0701 - 02/24 0700    P  O  0 0    I V  (mL/kg)  1100 (17)    NG/ 0    Feedings 695 80    Total Intake(mL/kg) 830 (12 8) 1180 (18 2)    Urine (mL/kg/hr) 3050 (2) 2250 (1 4)    Drains 340 (0 2) 170 (0 1)    Stool 0 (0) 0 (0)    Total Output 3390 2420    Net -2560 -1240          Unmeasured Urine Occurrence  1 x    Unmeasured Stool Occurrence 1 x 1 x          Intake/Output Summary (Last 24 hours) at 02/24/18 0641  Last data filed at 02/24/18 0601   Gross per 24 hour   Intake             1180 ml   Output             2420 ml   Net            -1240 ml       Invasive  Devices  Invasive Devices     Peripherally Inserted Central Catheter Line            PICC Line 05/23/22 Right Basilic 34 days          Drain            Closed/Suction Drain Midline;Superior Abdomen Other (Comment) 10 Fr  7 days    Closed/Suction Drain Left;Lateral LUQ Bulb 12 Fr  less than 1 day    NG/OG/Enteral Tube Nasogastric 8 Fr Left nares less than 1 day                Active medications  Scheduled Meds:    Current Facility-Administered Medications:  acetaminophen 975 mg Oral Q8H [facility] Hari Hicks PA-C    acetaminophen 325 mg Rectal Q4H PRN Carmela Narayan MD    ampicillin-sulbactam 3 g Intravenous Q6H Byron Cornejo MD Last Rate: 3 g (02/24/18 0507)   bisacodyl 10 mg Rectal Daily Sherrill Lennon MD    fluconazole 400 mg Intravenous Q24H ABNER Thacker Last Rate: 400 mg (02/23/18 1820)   gabapentin 300 mg Oral HS Ligia Andino MD    heparin (porcine) 5,000 Units Subcutaneous Q12H [facility] Ligia Andino MD    HYDROmorphone 0 5 mg Intravenous Q3H PRN Catherine Trotter PA-C    HYDROmorphone 1 mg Intravenous Q3H PRN Mejia Larson MD    insulin lispro 1-5 Units Subcutaneous Q6H [facility] Nayla Mcconnell PA-C    iohexol 50 mL Oral 90 min pre-procedure John Paul Parker MD    iohexol 50 mL Oral 90 min pre-procedure John Paul Parker MD    iohexol 50 mL Oral 60 Min Pre-Op Marcia Bellamy PA-C    iohexol 50 mL Intravenous Once in imaging Becky Coe MD    lactated ringers 100 mL/hr Intravenous Continuous Shanita Green CRNA Last Rate: Stopped (02/24/18 1995)   menthol-methyl salicylate  Apply externally 4x Daily PRN Wolf Trammell MD    metoprolol 10 mg Intravenous Q6H Sabrina Webb MD    mirtazapine 15 mg Oral HS James Young PA-C    nortriptyline 10 mg Oral BID Sabrina Webb MD    ondansetron 4 mg Intravenous Q4H PRN Sherrill Varela MD    pantoprazole 40 mg Intravenous Q24H [address] ABNER Tse      Continuous Infusions:    lactated ringers 100 mL/hr Last Rate: Stopped (02/24/18 0641)     PRN Meds:     acetaminophen 325 mg Q4H PRN   HYDROmorphone 0 5 mg Q3H PRN   HYDROmorphone 1 mg Q3H PRN   iohexol 50 mL Once in imaging   menthol-methyl salicylate  4x Daily PRN   ondansetron 4 mg Q4H PRN       Physical Exam: /85 (BP Location: Left arm)   Pulse 95   Temp 98 7 °F (37 1 °C) (Oral)   Resp 18   Ht 5' 4" (1 626 m)   Wt 64 7 kg (142 lb 10 2 oz)   LMP  (LMP Unknown)   SpO2 99%   Breastfeeding?  No   BMI 24 48 kg/m²     Physical Exam:  General Appearance: Alert, cooperative, no distress, appears stated age  Lungs: Clear to auscultation bilaterally, respirations unlablored   Heart: Regular rate and rhythm, S1 and S2 normal, no murmur, rub or gallop  Abdomen: Soft, non-tender, non distended, bowel sounds active in all four quadrants,   No masses or organomegaly    Laboratory and Diagnostics:    Results from last 7 days  Lab Units 02/23/18  0454 02/22/18  0610 02/21/18  0505 02/19/18  0456   WBC Thousand/uL 18 84* 17 98* 17 39* 18 79*   HEMOGLOBIN g/dL 9 7* 9 4* 8 8* 9 3*   HEMATOCRIT % 31 8* 30 9* 28 6* 30 1*   PLATELETS Thousands/uL 458* 543* 355 499*   NEUTROS PCT % 87*  --  83* 85*   MONOS PCT % 4  --  5 6       Results from last 7 days  Lab Units 02/23/18  0454 02/22/18  0610 02/21/18  0505   SODIUM mmol/L 141 141 144   POTASSIUM mmol/L 3 8 3 4* 3 2*   CHLORIDE mmol/L 101 101 103   CO2 mmol/L 35* 37* 35*   BUN mg/dL 12 15 14   CREATININE mg/dL 0 34* 0 37* 0 36*   CALCIUM mg/dL 10 4* 9 8 9 3   TOTAL PROTEIN g/dL  --   --  5 8*   BILIRUBIN TOTAL mg/dL  --   --  0 22   ALK PHOS U/L  --   --  83   ALT U/L  --   --  31   AST U/L  --   --  23   GLUCOSE RANDOM mg/dL 119 167* 175*       Results from last 7 days  Lab Units 02/23/18  0454 02/22/18  0610 02/19/18  0456   MAGNESIUM mg/dL 2 1 2 2 2 1     Lab Results   Component Value Date    PHOS 3 7 02/09/2018    PHOS 3 4 02/08/2018    PHOS 3 6 02/04/2018          No results found for: TROPONINT  ABG:  Lab Results   Component Value Date    PHART 7 484 (H) 01/25/2018    [id number] 33 6 (L) 01/25/2018    PO2ART 165 1 (H) 01/25/2018    [id number] 24 7 01/25/2018    BEART 1 6 01/25/2018    SOURCE Line, Arterial 01/25/2018       Blood Culture: No results found for: BLOODCX  Urine Culture: No results found for: URINECX  Sputum Culture: No components found for: SPUTUMCX    Imaging: I have personally reviewed pertinent reports     and I have personally reviewed pertinent films in PACS    VTE Pharmacologic Prophylaxis: Heparin  VTE Mechanical Prophylaxis: sequential compression device

## 2018-02-24 NOTE — PROGRESS NOTES
Thoracic Surgery   Post-Op Check Progress Note   Shereen Babcock 36 y o  female MRN: [id number]  Unit/Bed#: [facility] 834-01 Encounter: [id number]    Assessment and Plan:    36y o -year-old female with gastric perforation s/p hiatal hernia repair at outside hospital, s/p esophageal stent placement, exploratory laparotomy and washout  Has had persistent esophageal/gastric leak on imaging  Had bilateral pleural effusions which were drained  Perisplenic fluid collection was drained  Most recently with EGD & stent placement x 2       Plan:   NPO  IVF - [email or web address]  Restart tube feeds - trickle at 10cc/hr   Wean oxygen   Continue ADENIKE drains to bulb sxn and monitor output   Continue antibiotics per ID- unasyn/fluconazole, transition to PO next 1-2 days  IV dilaudid P R N  for Analgesia  Encourage incentive spirometry use  Will get PA/Lateral CXR in am   SQH and Venodynes for DVT prophylaxis       Subjective/Objective     Subjective: Pt feels ok - has taken a few sips of water without N/V  Currenlty on 2L nasal canula  Denies SOB or chest pain  [place] fed tube placed today - TF started at 10cc /hr which pt has been tolerating     Objective:     Blood pressure 167/98, pulse 87, temperature 98 5 °F (36 9 °C), temperature source Oral, resp  rate 22, height 5' 4" (1 626 m), weight 64 7 kg (142 lb 10 2 oz), SpO2 99 %, not currently breastfeeding  ,Body mass index is 24 48 kg/m²        Intake/Output Summary (Last 24 hours) at 02/23/18 2225  Last data filed at 02/23/18 2101   Gross per 24 hour   Intake             1100 ml   Output             2560 ml   Net            -1460 ml       Invasive Devices     Peripherally Inserted Central Catheter Line            PICC Line 43/00/48 Right Basilic 33 days          Drain            Closed/Suction Drain Midline;Superior Abdomen Other (Comment) 10 Fr  7 days    Closed/Suction Drain Left;Lateral LUQ Bulb 12 Fr  less than 1 day    NG/OG/Enteral Tube Nasogastric 8 Fr Left nares less than 1 day Physical Exam:    GENERAL APPEARANCE: Patient in no acute distress  HEENT: Keofed in nares   CV: Regular rate and rhythm; + S1, S2; no murmur/gallops/rubs appreciated  LUNGS: Clear to auscultation; no wheezes/rales/rhonci  On 2 L nasal canula   ABD: distended; tender  R ADENIKE - bilious / purulent output   L ADENIKE purulent output   SKIN: Warm, dry and well perfused; no rash; no jaundice      [facility]ee [facility]  2/23/2018

## 2018-02-24 NOTE — PROGRESS NOTES
Progress Note - Infectious Disease   Hayden Robison 36 y o  female MRN: [id number]  Unit/Bed#: [facility] 834-01 Encounter: [id number]      Impression/Recommendations:  1   Intra-abdominal/pelvic abscesses   Patient is status post multiple abdominal washouts   She has multiple drains in place   She is status post placement of drainage in a new perisplenic collection   She is clinically well and systemically stable  Operative cultures consistently growing  only ampicillin susceptible Enterococcus   A single culture also grew Saccharomyces, most likely colonization   Patient had a new drain placed in left abdomen on 02/16   Culture this drainage is growing Enterococcus again, with no other pathogens   WBC decreased after stent revision    Continue with IV Unasyn/fluconazole  Continue drainage  Treat x at least 14 days from last drainage, another 7 days      2   Gastric perforation, status post repair, with persistent leak   She is now status post placement of esophageal stent   Barium swallow from today showed persistent leak  Patient is status post esophageal stent exchange with persistent leak  She is now status post a new stent inside initial stent  WBC decreased afterwards  Monitor for recurrent leak      3   Persistent leukocytosis   WBC has improved but still remains i elevated   This is most likely related to persistent gastric leaking  Will continue antibiotic  Will continue to monitor WBC  WBC decreasing after stent revision  Antibiotic plan as in above    Monitor WBC and temperature closely        4   Possible infected  shunt   Given presence of tip of  shunt in peritoneal space, there is high probability of  shunt infection   Fortunately, patient has no symptoms concerning for meningitis   She has neurological deficits   She is status post tapping of  shunt at [facility]   CSF culture there had no growth but patient had prior antibiotic   I will go ahead and treat her for possible/presumptive  shunt infection   Patient is now status post  shunt resection   CSF culture here also negative  [facility] completed 2 weeks of antibiotic after shunt removal     No further antibiotic needed for this      5   Bilateral pleural effusion, status post chest tube placement   This is most likely sympathetic effusion, secondary to intra-abdominal abscesses   Chest tubes have been removed   Patient remains comfortable      6   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   No clinical signs of cellulitis   Patient has no headache    Wound is now sutured   Drainage/leakage appears to be resolving  Monitor      Discussed with the patient in detail regarding above plan      Antibiotics:  Unasyn  Fluconazole # 25  POD # 29  Post/drainage # 7     Subjective:  Patient is status post esophageal stent exchange  She had persistent leak after worse  Last night, a new stent was placed inside the initial stent  Feeding tube back in  Patient is comfortable  Abdominal pain controlled     Temperature stays down   No chills  She is tolerating antibiotics well   No nausea, vomiting or diarrhea  Objective:  Vitals:  HR:  [82-98] 91  Resp:  [14-24] 16  BP: (144-172)/() 144/84  SpO2:  [97 %-100 %] 97 %  Temp (24hrs), Av 7 °F (37 1 °C), Min:97 8 °F (36 6 °C), Max:99 4 °F (37 4 °C)  Current: Temperature: 99 1 °F (37 3 °C)    Physical Exam:     General: Awake, alert, cooperative, no distress  Lungs: Decreased breath sounds, no rales, no wheezing, respirations unlabored  Heart[de-identified]  Tachycardic with regular rhythm, S1 and S2 normal, no murmur  Abdomen: Soft, mildly distended, mild diffuse tenderness  Incision healed  No drainage  Drains seropurulent  Extremities: Trace edema  No erythema/warmth  No ulcer  Nontender to palpation  Skin:  No rash       Invasive Devices     Peripherally Inserted Central Catheter Line            PICC Line 32/70/26 Right Basilic 34 days          Drain Closed/Suction Drain Midline;Superior Abdomen Other (Comment) 10 Fr  7 days    Closed/Suction Drain Left;Lateral LUQ Bulb 12 Fr  less than 1 day    NG/OG/Enteral Tube Nasogastric 8 Fr Left nares less than 1 day                Labs studies:   I have personally reviewed pertinent labs  Results from last 7 days  Lab Units 02/23/18  0454 02/22/18  0610 02/21/18  0505   SODIUM mmol/L 141 141 144   POTASSIUM mmol/L 3 8 3 4* 3 2*   CHLORIDE mmol/L 101 101 103   CO2 mmol/L 35* 37* 35*   ANION GAP mmol/L 5 3* 6   BUN mg/dL 12 15 14   CREATININE mg/dL 0 34* 0 37* 0 36*   EGFR ml/min/1 73sq m 135 131 132   GLUCOSE RANDOM mg/dL 119 167* 175*   CALCIUM mg/dL 10 4* 9 8 9 3   AST U/L  --   --  23   ALT U/L  --   --  31   ALK PHOS U/L  --   --  83   TOTAL PROTEIN g/dL  --   --  5 8*   BILIRUBIN TOTAL mg/dL  --   --  0 22       Results from last 7 days  Lab Units 02/24/18  0918 02/23/18  0454 02/22/18  0610   WBC Thousand/uL 14 43* 18 84* 17 98*   HEMOGLOBIN g/dL 9 4* 9 7* 9 4*   PLATELETS Thousands/uL 500* 458* 543*           Imaging Studies:   I have personally reviewed pertinent imaging study reports and images in PACS  EKG, Pathology, and Other Studies:   I have personally reviewed pertinent reports

## 2018-02-24 NOTE — PROGRESS NOTES
Progress Note - Thoracic Surgery   Ayaz Wellington 36 y o  female MRN: [id number]  Unit/Bed#: [facility] 834-01 Encounter: [id number]    Assessment and Plan:  Patient Active Problem List   Diagnosis    Gastric leak    Acute peritonitis    Idiopathic intracranial hypertension    S/P  shunt     (ventriculoperitoneal) shunt status    Murmur, cardiac    Refusal of blood product    Gastroesophageal reflux disease    Choroidal nevus, right eye    Moderate protein-calorie malnutrition (HCC)    Esophageal anastomotic leak       36y o -year-old female with gastric perforation s/p hiatal hernia repair at outside hospital, s/p esophageal stent placement, exploratory laparotomy and washout  Has had persistent esophageal/gastric leak on imaging  Had bilateral pleural effusions which were drained  Perisplenic fluid collection was drained  Most recently with EGD & stent placement x 2         Plan:   NPO  Change fluid to D5 1/2 NS @ 100   Continue to tube feeds & trickle to goal   Wean oxygen   Continue ADENIKE drains to bulb sxn and monitor output   Continue antibiotics per ID- unasyn/fluconazole, transition to PO next 1-2 days  IV dilaudid P R N  for Analgesia  Encourage incentive spirometry use  Follow up on PA/Lateral CXR in am   SQ and Venodynes for DVT prophylaxis        Subjective:  Patient is complaining of some mid epigastric pain which is being well controlled with her pain medicaitons  She states she slept well  Objective:       Vitals   Vitals:    02/23/18 2000 02/23/18 2100 02/23/18 2300 02/24/18 0300   BP: (!) 172/104 167/98 164/87 144/85   BP Location: Left arm Left arm Right arm Left arm   Pulse: 91 87 97 95   Resp: (!) 24 22 17 18   Temp: 98 6 °F (37 °C) 98 5 °F (36 9 °C) 98 7 °F (37 1 °C) 98 7 °F (37 1 °C)   TempSrc: Oral Oral Oral Oral   SpO2: 100% 99% 99% 99%   Weight:       Height:         Temp  Min: 97 1 °F (36 2 °C)  Max: 100 5 °F (38 1 °C)  IBW: 54 7 kg   Body mass index is 24 48 kg/m²      I/O   I/O 02/22 0701 - 02/23 0700 02/23 0701 - 02/24 0700    P  O  0 0    I V  (mL/kg)  1100 (17)    NG/ 0    Feedings 695 80    Total Intake(mL/kg) 830 (12 8) 1180 (18 2)    Urine (mL/kg/hr) 3050 (2) 2250 (1 4)    Drains 340 (0 2) 170 (0 1)    Stool 0 (0) 0 (0)    Total Output 3390 2420    Net -2560 -1240          Unmeasured Urine Occurrence  1 x    Unmeasured Stool Occurrence 1 x 1 x          Intake/Output Summary (Last 24 hours) at 02/24/18 8214  Last data filed at 02/24/18 0601   Gross per 24 hour   Intake             1180 ml   Output             2420 ml   Net            -1240 ml       Invasive  Devices  Invasive Devices     Peripherally Inserted Central Catheter Line            PICC Line 56/89/85 Right Basilic 34 days          Drain            Closed/Suction Drain Midline;Superior Abdomen Other (Comment) 10 Fr  7 days    Closed/Suction Drain Left;Lateral LUQ Bulb 12 Fr  less than 1 day    NG/OG/Enteral Tube Nasogastric 8 Fr Left nares less than 1 day                Active medications  Scheduled Meds:  Current Facility-Administered Medications:  acetaminophen 975 mg Oral Q8H [address] Hari Hicks PA-C    acetaminophen 325 mg Rectal Q4H PRN Chencho Brewer MD    ampicillin-sulbactam 3 g Intravenous Q6H Pastor Danita MD Last Rate: 3 g (02/24/18 0507)   bisacodyl 10 mg Rectal Daily Landen Max MD    fluconazole 400 mg Intravenous Q24H ABNER Aquino Last Rate: 400 mg (02/23/18 1820)   gabapentin 300 mg Oral HS Trcie Saldivar MD    heparin (porcine) 5,000 Units Subcutaneous Q12H [address] Trice Saldivar MD    HYDROmorphone 0 5 mg Intravenous Q3H PRN Cindy Amaral PA-C    HYDROmorphone 1 mg Intravenous Q3H PRN Roz Meraz MD    insulin lispro 1-5 Units Subcutaneous Q6H [address] Nayla Mcconnell PA-C    iohexol 50 mL Oral 90 min pre-procedure Delfina Fox MD    iohexol 50 mL Oral 90 min pre-procedure Delfina Fox MD    iohexol 50 mL Oral 60 Min Pre-Op Marcia Bellamy PA-C    iohexol 50 mL Intravenous Once in imaging Agus Galicia MD    lactated ringers 100 mL/hr Intravenous Continuous Jorden Ibrahim CRNA Last Rate: 100 mL/hr (02/23/18 2054)   menthol-methyl salicylate  Apply externally 4x Daily PRN Shiela Anderson MD    metoprolol 10 mg Intravenous Q6H Lgiia Andino MD    mirtazapine 15 mg Oral HS Burlison Cornea, PA-C    nortriptyline 10 mg Oral BID Ligia Andino MD    ondansetron 4 mg Intravenous Q4H PRN Calvin Carrizales MD    pantoprazole 40 mg Intravenous Q24H [facility] ABNER Tse      Continuous Infusions:    lactated ringers 100 mL/hr Last Rate: 100 mL/hr (02/23/18 2054)     PRN Meds:     acetaminophen 325 mg Q4H PRN   HYDROmorphone 0 5 mg Q3H PRN   HYDROmorphone 1 mg Q3H PRN   iohexol 50 mL Once in imaging   menthol-methyl salicylate  4x Daily PRN   ondansetron 4 mg Q4H PRN       Physical Exam: /85 (BP Location: Left arm)   Pulse 95   Temp 98 7 °F (37 1 °C) (Oral)   Resp 18   Ht 5' 4" (1 626 m)   Wt 64 7 kg (142 lb 10 2 oz)   LMP  (LMP Unknown)   SpO2 99%   Breastfeeding?  No   BMI 24 48 kg/m²     Physical Exam:  General Appearance: Alert, cooperative, no distress, appears stated age  Lungs: Clear to auscultation bilaterally, respirations unlablored   Heart: Regular rate and rhythm, S1 and S2 normal, no murmur, rub or gallop  Abdomen: Soft, non-tender, non distended, bowel sounds active in all four quadrants,   No masses or organomegaly    Laboratory and Diagnostics:    Results from last 7 days  Lab Units 02/23/18  0454 02/22/18  0610 02/21/18  0505 02/19/18  0456   WBC Thousand/uL 18 84* 17 98* 17 39* 18 79*   HEMOGLOBIN g/dL 9 7* 9 4* 8 8* 9 3*   HEMATOCRIT % 31 8* 30 9* 28 6* 30 1*   PLATELETS Thousands/uL 458* 543* 355 499*   NEUTROS PCT % 87*  --  83* 85*   MONOS PCT % 4  --  5 6       Results from last 7 days  Lab Units 02/23/18  0454 02/22/18  0610 02/21/18  0505   SODIUM mmol/L 141 141 144   POTASSIUM mmol/L 3 8 3 4* 3 2*   CHLORIDE mmol/L 101 101 103 CO2 mmol/L 35* 37* 35*   BUN mg/dL 12 15 14   CREATININE mg/dL 0 34* 0 37* 0 36*   CALCIUM mg/dL 10 4* 9 8 9 3   TOTAL PROTEIN g/dL  --   --  5 8*   BILIRUBIN TOTAL mg/dL  --   --  0 22   ALK PHOS U/L  --   --  83   ALT U/L  --   --  31   AST U/L  --   --  23   GLUCOSE RANDOM mg/dL 119 167* 175*       Results from last 7 days  Lab Units 02/23/18  0454 02/22/18  0610 02/19/18  0456   MAGNESIUM mg/dL 2 1 2 2 2 1     Lab Results   Component Value Date    PHOS 3 7 02/09/2018    PHOS 3 4 02/08/2018    PHOS 3 6 02/04/2018          No results found for: TROPONINT  ABG:  Lab Results   Component Value Date    PHART 7 484 (H) 01/25/2018    [id number] 33 6 (L) 01/25/2018    PO2ART 165 1 (H) 01/25/2018    [id number] 24 7 01/25/2018    BEART 1 6 01/25/2018    SOURCE Line, Arterial 01/25/2018       Blood Culture: No results found for: BLOODCX  Urine Culture: No results found for: URINECX  Sputum Culture: No components found for: SPUTUMCX    Imaging: I have personally reviewed pertinent reports     and I have personally reviewed pertinent films in PACS    VTE Pharmacologic Prophylaxis: Heparin  VTE Mechanical Prophylaxis: sequential compression device

## 2018-02-25 LAB
ANION GAP SERPL CALCULATED.3IONS-SCNC: 6 MMOL/L (ref 4–13)
BASOPHILS # BLD AUTO: 0.02 THOUSANDS/ΜL (ref 0–0.1)
BASOPHILS NFR BLD AUTO: 0 % (ref 0–1)
BUN SERPL-MCNC: 18 MG/DL (ref 5–25)
CALCIUM SERPL-MCNC: 10.4 MG/DL (ref 8.3–10.1)
CHLORIDE SERPL-SCNC: 101 MMOL/L (ref 100–108)
CO2 SERPL-SCNC: 35 MMOL/L (ref 21–32)
CREAT SERPL-MCNC: 0.49 MG/DL (ref 0.6–1.3)
EOSINOPHIL # BLD AUTO: 0.28 THOUSAND/ΜL (ref 0–0.61)
EOSINOPHIL NFR BLD AUTO: 2 % (ref 0–6)
ERYTHROCYTE [DISTWIDTH] IN BLOOD BY AUTOMATED COUNT: 15.9 % (ref 11.6–15.1)
GFR SERPL CREATININE-BSD FRML MDRD: 120 ML/MIN/1.73SQ M
GLUCOSE SERPL-MCNC: 142 MG/DL (ref 65–140)
GLUCOSE SERPL-MCNC: 193 MG/DL (ref 65–140)
GLUCOSE SERPL-MCNC: 196 MG/DL (ref 65–140)
GLUCOSE SERPL-MCNC: 211 MG/DL (ref 65–140)
GLUCOSE SERPL-MCNC: 215 MG/DL (ref 65–140)
HCT VFR BLD AUTO: 29.6 % (ref 34.8–46.1)
HGB BLD-MCNC: 9.1 G/DL (ref 11.5–15.4)
LYMPHOCYTES # BLD AUTO: 0.87 THOUSANDS/ΜL (ref 0.6–4.47)
LYMPHOCYTES NFR BLD AUTO: 6 % (ref 14–44)
MAGNESIUM SERPL-MCNC: 2.1 MG/DL (ref 1.6–2.6)
MCH RBC QN AUTO: 28.5 PG (ref 26.8–34.3)
MCHC RBC AUTO-ENTMCNC: 30.7 G/DL (ref 31.4–37.4)
MCV RBC AUTO: 93 FL (ref 82–98)
MONOCYTES # BLD AUTO: 0.96 THOUSAND/ΜL (ref 0.17–1.22)
MONOCYTES NFR BLD AUTO: 6 % (ref 4–12)
NEUTROPHILS # BLD AUTO: 13.56 THOUSANDS/ΜL (ref 1.85–7.62)
NEUTS SEG NFR BLD AUTO: 86 % (ref 43–75)
NRBC BLD AUTO-RTO: 0 /100 WBCS
PLATELET # BLD AUTO: 526 THOUSANDS/UL (ref 149–390)
PMV BLD AUTO: 9.3 FL (ref 8.9–12.7)
POTASSIUM SERPL-SCNC: 3.3 MMOL/L (ref 3.5–5.3)
RBC # BLD AUTO: 3.19 MILLION/UL (ref 3.81–5.12)
SODIUM SERPL-SCNC: 142 MMOL/L (ref 136–145)
WBC # BLD AUTO: 15.8 THOUSAND/UL (ref 4.31–10.16)

## 2018-02-25 PROCEDURE — 80048 BASIC METABOLIC PNL TOTAL CA: CPT | Performed by: STUDENT IN AN ORGANIZED HEALTH CARE EDUCATION/TRAINING PROGRAM

## 2018-02-25 PROCEDURE — C9113 INJ PANTOPRAZOLE SODIUM, VIA: HCPCS | Performed by: NURSE PRACTITIONER

## 2018-02-25 PROCEDURE — 82948 REAGENT STRIP/BLOOD GLUCOSE: CPT

## 2018-02-25 PROCEDURE — 85025 COMPLETE CBC W/AUTO DIFF WBC: CPT | Performed by: STUDENT IN AN ORGANIZED HEALTH CARE EDUCATION/TRAINING PROGRAM

## 2018-02-25 PROCEDURE — 83735 ASSAY OF MAGNESIUM: CPT | Performed by: STUDENT IN AN ORGANIZED HEALTH CARE EDUCATION/TRAINING PROGRAM

## 2018-02-25 PROCEDURE — 99233 SBSQ HOSP IP/OBS HIGH 50: CPT | Performed by: INTERNAL MEDICINE

## 2018-02-25 PROCEDURE — 99231 SBSQ HOSP IP/OBS SF/LOW 25: CPT | Performed by: THORACIC SURGERY (CARDIOTHORACIC VASCULAR SURGERY)

## 2018-02-25 RX ADMIN — POTASSIUM CHLORIDE 20 MEQ: 2 INJECTION, SOLUTION, CONCENTRATE INTRAVENOUS at 10:05

## 2018-02-25 RX ADMIN — METOPROLOL TARTRATE 10 MG: 1 INJECTION, SOLUTION INTRAVENOUS at 23:59

## 2018-02-25 RX ADMIN — INSULIN LISPRO 1 UNITS: 100 INJECTION, SOLUTION INTRAVENOUS; SUBCUTANEOUS at 05:50

## 2018-02-25 RX ADMIN — INSULIN LISPRO 2 UNITS: 100 INJECTION, SOLUTION INTRAVENOUS; SUBCUTANEOUS at 12:41

## 2018-02-25 RX ADMIN — HYDROMORPHONE HYDROCHLORIDE 1 MG: 1 INJECTION, SOLUTION INTRAMUSCULAR; INTRAVENOUS; SUBCUTANEOUS at 16:43

## 2018-02-25 RX ADMIN — HYDROMORPHONE HYDROCHLORIDE 1 MG: 1 INJECTION, SOLUTION INTRAMUSCULAR; INTRAVENOUS; SUBCUTANEOUS at 23:58

## 2018-02-25 RX ADMIN — Medication 3 G: at 17:37

## 2018-02-25 RX ADMIN — HEPARIN SODIUM 5000 UNITS: 5000 INJECTION, SOLUTION INTRAVENOUS; SUBCUTANEOUS at 20:15

## 2018-02-25 RX ADMIN — ONDANSETRON 4 MG: 2 INJECTION INTRAMUSCULAR; INTRAVENOUS at 10:05

## 2018-02-25 RX ADMIN — POTASSIUM CHLORIDE 20 MEQ: 2 INJECTION, SOLUTION, CONCENTRATE INTRAVENOUS at 12:33

## 2018-02-25 RX ADMIN — HEPARIN SODIUM 5000 UNITS: 5000 INJECTION, SOLUTION INTRAVENOUS; SUBCUTANEOUS at 09:54

## 2018-02-25 RX ADMIN — HYDROMORPHONE HYDROCHLORIDE 1 MG: 1 INJECTION, SOLUTION INTRAMUSCULAR; INTRAVENOUS; SUBCUTANEOUS at 05:13

## 2018-02-25 RX ADMIN — METOPROLOL TARTRATE 10 MG: 1 INJECTION, SOLUTION INTRAVENOUS at 17:36

## 2018-02-25 RX ADMIN — METOPROLOL TARTRATE 10 MG: 1 INJECTION, SOLUTION INTRAVENOUS at 05:13

## 2018-02-25 RX ADMIN — FLUCONAZOLE 400 MG: 2 INJECTION INTRAVENOUS at 16:44

## 2018-02-25 RX ADMIN — HYDROMORPHONE HYDROCHLORIDE 1 MG: 1 INJECTION, SOLUTION INTRAMUSCULAR; INTRAVENOUS; SUBCUTANEOUS at 14:36

## 2018-02-25 RX ADMIN — PANTOPRAZOLE SODIUM 40 MG: 40 INJECTION, POWDER, FOR SOLUTION INTRAVENOUS at 09:54

## 2018-02-25 RX ADMIN — DEXTROSE, SODIUM CHLORIDE, AND POTASSIUM CHLORIDE 100 ML/HR: 5; .45; .15 INJECTION INTRAVENOUS at 05:13

## 2018-02-25 RX ADMIN — HYDROMORPHONE HYDROCHLORIDE 1 MG: 1 INJECTION, SOLUTION INTRAMUSCULAR; INTRAVENOUS; SUBCUTANEOUS at 09:54

## 2018-02-25 RX ADMIN — HYDROMORPHONE HYDROCHLORIDE 0.5 MG: 1 INJECTION, SOLUTION INTRAMUSCULAR; INTRAVENOUS; SUBCUTANEOUS at 20:08

## 2018-02-25 RX ADMIN — ONDANSETRON 4 MG: 2 INJECTION INTRAMUSCULAR; INTRAVENOUS at 21:46

## 2018-02-25 RX ADMIN — HYDROMORPHONE HYDROCHLORIDE 1 MG: 1 INJECTION, SOLUTION INTRAMUSCULAR; INTRAVENOUS; SUBCUTANEOUS at 00:08

## 2018-02-25 RX ADMIN — HYDROMORPHONE HYDROCHLORIDE 1 MG: 1 INJECTION, SOLUTION INTRAMUSCULAR; INTRAVENOUS; SUBCUTANEOUS at 21:46

## 2018-02-25 RX ADMIN — POTASSIUM CHLORIDE 20 MEQ: 2 INJECTION, SOLUTION, CONCENTRATE INTRAVENOUS at 14:44

## 2018-02-25 RX ADMIN — METOPROLOL TARTRATE 10 MG: 1 INJECTION, SOLUTION INTRAVENOUS at 00:05

## 2018-02-25 RX ADMIN — Medication 3 G: at 00:30

## 2018-02-25 RX ADMIN — Medication 3 G: at 23:59

## 2018-02-25 RX ADMIN — HYDROMORPHONE HYDROCHLORIDE 1 MG: 1 INJECTION, SOLUTION INTRAMUSCULAR; INTRAVENOUS; SUBCUTANEOUS at 12:33

## 2018-02-25 RX ADMIN — Medication 3 G: at 12:32

## 2018-02-25 RX ADMIN — Medication 3 G: at 05:50

## 2018-02-25 RX ADMIN — METOPROLOL TARTRATE 10 MG: 1 INJECTION, SOLUTION INTRAVENOUS at 12:33

## 2018-02-25 RX ADMIN — INSULIN LISPRO 1 UNITS: 100 INJECTION, SOLUTION INTRAVENOUS; SUBCUTANEOUS at 00:05

## 2018-02-25 NOTE — PROGRESS NOTES
Progress Note - Infectious Disease   Daya Cueto 36 y o  female MRN: [id number]  Unit/Bed#: [facility] 834-01 Encounter: [id number]      Impression/Recommendations:  1   Intra-abdominal/pelvic abscesses   Patient is status post multiple abdominal washouts   She has multiple drains in place   She is status post placement of drainage in a new perisplenic collection   She is clinically well and systemically stable  Operative cultures consistently growing  only ampicillin susceptible Enterococcus   A single culture also grew Saccharomyces, most likely colonization   Patient had a new drain placed in left abdomen on 02/16   Culture this drainage is growing Enterococcus again, with no other pathogens   WBC decreased after stent revision    Continue with IV Unasyn/fluconazole  Continue drainage  Treat x at least 14 days from last drainage, another 6 days      2   Gastric perforation, status post repair, with persistent leak   She is now status post placement of esophageal stent   Barium swallow from today showed persistent leak   Patient is status post esophageal stent exchange with persistent leak  She is now status post a new stent inside initial stent  WBC decreased afterwards  Monitor for recurrent leak      3   Persistent leukocytosis   WBC has improved but still remains i elevated   This is most likely related to persistent gastric leaking   Will continue antibiotic   Will continue to monitor WBC    WBC decreasing after stent revision  Antibiotic plan as in above    Monitor WBC and temperature closely        4   Possible infected  shunt   Given presence of tip of  shunt in peritoneal space, there is high probability of  shunt infection   Fortunately, patient has no symptoms concerning for meningitis   She has neurological deficits   She is status post tapping of  shunt at [facility]   CSF culture there had no growth but patient had prior antibiotic   I will go ahead and treat her for possible/presumptive  shunt infection   Patient is now status post  shunt resection   CSF culture here also negative  Azeem Rey completed 2 weeks of antibiotic after shunt removal     No further antibiotic needed for this      5   Bilateral pleural effusion, status post chest tube placement   This is most likely sympathetic effusion, secondary to intra-abdominal abscesses   Chest tubes have been removed   Patient remains comfortable      6   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   No clinical signs of cellulitis   Patient has no headache    Wound is now sutured   Drainage/leakage appears to be resolving  Monitor      Discussed with the patient in detail regarding above plan      Antibiotics:  Unasyn  Fluconazole # 26  POD # 30  Post/drainage # 8     Subjective:  Patient is comfortable  She is tolerating tube feed  Abdominal pain controlled     Temperature stays down   No chills  She is tolerating antibiotics well   No nausea, vomiting or diarrhea  Objective:  Vitals:  HR:  [82-99] 96  Resp:  [18-20] 18  BP: (152-176)/() 152/90  SpO2:  [96 %-99 %] 96 %  Temp (24hrs), Av 2 °F (36 8 °C), Min:97 7 °F (36 5 °C), Max:99 1 °F (37 3 °C)  Current: Temperature: 99 1 °F (37 3 °C)    Physical Exam:     General: Awake, alert, cooperative, no distress  Lungs: Decreased breath sounds, no rales, no wheezing, respirations unlabored  Heart[de-identified]  Tachycardic with regular rhythm, S1 and S2 normal, no murmur  Abdomen: Soft, nondistended, mild diffuse tenderness, bowel sounds active all four quadrants,        no masses, no organomegaly  Drains improving, still somewhat seropurulent  Extremities: Trace edema  No erythema/warmth  No ulcer  Nontender to palpation  Skin:  No rash       Invasive Devices     Peripherally Inserted Central Catheter Line            PICC Line / Right Basilic 35 days          Drain            Closed/Suction Drain Midline;Superior Abdomen Other (Comment) 10 Fr  8 days    Closed/Suction Drain Left;Lateral LUQ Bulb 12 Fr  1 day    NG/OG/Enteral Tube Nasogastric 8 Fr Left nares 1 day                Labs studies:   I have personally reviewed pertinent labs  Results from last 7 days  Lab Units 02/25/18  0503 02/23/18  0454 02/22/18  0610 02/21/18  0505   SODIUM mmol/L 142 141 141 144   POTASSIUM mmol/L 3 3* 3 8 3 4* 3 2*   CHLORIDE mmol/L 101 101 101 103   CO2 mmol/L 35* 35* 37* 35*   ANION GAP mmol/L 6 5 3* 6   BUN mg/dL 18 12 15 14   CREATININE mg/dL 0 49* 0 34* 0 37* 0 36*   EGFR ml/min/1 73sq m 120 135 131 132   GLUCOSE RANDOM mg/dL 211* 119 167* 175*   CALCIUM mg/dL 10 4* 10 4* 9 8 9 3   AST U/L  --   --   --  23   ALT U/L  --   --   --  31   ALK PHOS U/L  --   --   --  83   TOTAL PROTEIN g/dL  --   --   --  5 8*   BILIRUBIN TOTAL mg/dL  --   --   --  0 22       Results from last 7 days  Lab Units 02/25/18  0503 02/24/18  0918 02/23/18  0454   WBC Thousand/uL 15 80* 14 43* 18 84*   HEMOGLOBIN g/dL 9 1* 9 4* 9 7*   PLATELETS Thousands/uL 526* 500* 458*           Imaging Studies:   I have personally reviewed pertinent imaging study reports and images in PACS  EKG, Pathology, and Other Studies:   I have personally reviewed pertinent reports

## 2018-02-25 NOTE — PROGRESS NOTES
Progress Note - Thoracic Surgery   Real Gallo 36 y o  female MRN: [id number]  Unit/Bed#: [facility] 834-01 Encounter: [id number]    Assessment:  36 F with gastric perforation after hiatal hernia repain, s/p ex-alp washout repair of perforation, EGD with stenting, IR drainage of intraabdominal collections, EGD with stent exchange and repeat stenting    Plan:  Continue NPO  Saline lock  Tube feeds at goal  Continue drains  Replete electrolytes  Abx per ID  OOB and pulmonary toilet    Subjective/Objective     Subjective: No acute events  Episode of diarrhea yesterday  Tolerating tube feeds  Abdominal pain improved, but complaining of some upper chest discomfort  Objective:    Blood pressure (!) 176/93, pulse 99, temperature 97 9 °F (36 6 °C), temperature source Axillary, resp  rate 18, height 5' 4" (1 626 m), weight 65 8 kg (145 lb 1 oz), SpO2 98 %, not currently breastfeeding  ,Body mass index is 24 9 kg/m²  Intake/Output Summary (Last 24 hours) at 02/25/18 0725  Last data filed at 02/25/18 1135   Gross per 24 hour   Intake          2326 67 ml   Output             3180 ml   Net          -853 33 ml       Invasive Devices     Peripherally Inserted Central Catheter Line            PICC Line 11/59/41 Right Basilic 35 days          Drain            Closed/Suction Drain Midline;Superior Abdomen Other (Comment) 10 Fr  8 days    Closed/Suction Drain Left;Lateral LUQ Bulb 12 Fr  1 day    NG/OG/Enteral Tube Nasogastric 8 Fr Left nares 1 day                Physical Exam:   General: NAD, AAOx3  CV: regular rate  Upper chest incision c/d/i  Chest: breath sounds bilaterally  Abdomen: soft, minimally tender, incision c/d/i, drains in place   Angy Farrell in place taped to face  Extremities: atraumatic, no edema        Results from last 7 days  Lab Units 02/25/18  0503 02/24/18  0918 02/23/18  0454   WBC Thousand/uL 15 80* 14 43* 18 84*   HEMOGLOBIN g/dL 9 1* 9 4* 9 7*   HEMATOCRIT % 29 6* 29 9* 31 8*   PLATELETS Thousands/uL 526* 500* 458*       Results from last 7 days  Lab Units 02/25/18  0503 02/23/18  0454 02/22/18  0610   SODIUM mmol/L 142 141 141   POTASSIUM mmol/L 3 3* 3 8 3 4*   CHLORIDE mmol/L 101 101 101   CO2 mmol/L 35* 35* 37*   BUN mg/dL 18 12 15   CREATININE mg/dL 0 49* 0 34* 0 37*   GLUCOSE RANDOM mg/dL 211* 119 167*   CALCIUM mg/dL 10 4* 10 4* 9 8

## 2018-02-25 NOTE — PROGRESS NOTES
Progress Note - Acute Care Surgery   Ayaz Wellington 36 y o  female MRN: [id number]  Unit/Bed#: [facility] 834-01 Encounter: [id number]    Assessment:  36 F with gastric perforation after hiatal hernia repair, s/p ex-lap and washout, repair of gastric perforation, EGD with stenting, IR drainage, EGD and repeat stenting x2    Plan:  Continue NPO  Saline lock  Tube feeds at goal  Continue drains  WBC slightly elevated, 15 8 today from 14 4  Replete electrolytes  Abx per ID  OOB, pulmonary toilet    Subjective/Objective     Subjective: No acute events overnight  Had an episode of diarrhea  Tolerating tube feeds  Complaining of some chest discomfort, abdominal pain improved  Objective:    Blood pressure (!) 176/93, pulse 99, temperature 97 9 °F (36 6 °C), temperature source Axillary, resp  rate 18, height 5' 4" (1 626 m), weight 65 8 kg (145 lb 1 oz), SpO2 98 %, not currently breastfeeding  ,Body mass index is 24 9 kg/m²  Intake/Output Summary (Last 24 hours) at 02/25/18 0715  Last data filed at 02/25/18 2850   Gross per 24 hour   Intake          2326 67 ml   Output             3180 ml   Net          -853 33 ml       Invasive Devices     Peripherally Inserted Central Catheter Line            PICC Line 10/72/64 Right Basilic 35 days          Drain            Closed/Suction Drain Midline;Superior Abdomen Other (Comment) 10 Fr  8 days    Closed/Suction Drain Left;Lateral LUQ Bulb 12 Fr  1 day    NG/OG/Enteral Tube Nasogastric 8 Fr Left nares 1 day                Physical Exam:   General: NAD, AAOx3  CV: regular rate  Chest: breath sounds bilaterally, right upper chest incision c/d/i, mild chest wall tenderness  Abdomen: soft, minimally tender, non-distended   Drains in place, keofed in place secured to face  Extremities: atraumatic, no edema        Results from last 7 days  Lab Units 02/25/18  0503 02/24/18  0918 02/23/18  0454   WBC Thousand/uL 15 80* 14 43* 18 84*   HEMOGLOBIN g/dL 9 1* 9 4* 9 7*   HEMATOCRIT % 29 6* 29 9* 31 8*   PLATELETS Thousands/uL 526* 500* 458*       Results from last 7 days  Lab Units 02/25/18  0503 02/23/18  0454 02/22/18  0610   SODIUM mmol/L 142 141 141   POTASSIUM mmol/L 3 3* 3 8 3 4*   CHLORIDE mmol/L 101 101 101   CO2 mmol/L 35* 35* 37*   BUN mg/dL 18 12 15   CREATININE mg/dL 0 49* 0 34* 0 37*   GLUCOSE RANDOM mg/dL 211* 119 167*   CALCIUM mg/dL 10 4* 10 4* 9 8

## 2018-02-26 ENCOUNTER — APPOINTMENT (INPATIENT)
Dept: RADIOLOGY | Facility: HOSPITAL | Age: 43
DRG: 711 | End: 2018-02-26
Payer: COMMERCIAL

## 2018-02-26 LAB
ANION GAP SERPL CALCULATED.3IONS-SCNC: 3 MMOL/L (ref 4–13)
BASOPHILS # BLD AUTO: 0.02 THOUSANDS/ΜL (ref 0–0.1)
BASOPHILS NFR BLD AUTO: 0 % (ref 0–1)
BUN SERPL-MCNC: 14 MG/DL (ref 5–25)
CALCIUM SERPL-MCNC: 10 MG/DL (ref 8.3–10.1)
CHLORIDE SERPL-SCNC: 103 MMOL/L (ref 100–108)
CO2 SERPL-SCNC: 36 MMOL/L (ref 21–32)
CREAT SERPL-MCNC: 0.42 MG/DL (ref 0.6–1.3)
EOSINOPHIL # BLD AUTO: 0.61 THOUSAND/ΜL (ref 0–0.61)
EOSINOPHIL NFR BLD AUTO: 3 % (ref 0–6)
ERYTHROCYTE [DISTWIDTH] IN BLOOD BY AUTOMATED COUNT: 16 % (ref 11.6–15.1)
FUNGUS SPEC CULT: NORMAL
GFR SERPL CREATININE-BSD FRML MDRD: 126 ML/MIN/1.73SQ M
GLUCOSE SERPL-MCNC: 147 MG/DL (ref 65–140)
GLUCOSE SERPL-MCNC: 176 MG/DL (ref 65–140)
GLUCOSE SERPL-MCNC: 178 MG/DL (ref 65–140)
GLUCOSE SERPL-MCNC: 180 MG/DL (ref 65–140)
GLUCOSE SERPL-MCNC: 186 MG/DL (ref 65–140)
HCT VFR BLD AUTO: 31.9 % (ref 34.8–46.1)
HGB BLD-MCNC: 9.5 G/DL (ref 11.5–15.4)
LYMPHOCYTES # BLD AUTO: 1.3 THOUSANDS/ΜL (ref 0.6–4.47)
LYMPHOCYTES NFR BLD AUTO: 7 % (ref 14–44)
MCH RBC QN AUTO: 27.9 PG (ref 26.8–34.3)
MCHC RBC AUTO-ENTMCNC: 29.8 G/DL (ref 31.4–37.4)
MCV RBC AUTO: 94 FL (ref 82–98)
MONOCYTES # BLD AUTO: 1.04 THOUSAND/ΜL (ref 0.17–1.22)
MONOCYTES NFR BLD AUTO: 5 % (ref 4–12)
NEUTROPHILS # BLD AUTO: 16.37 THOUSANDS/ΜL (ref 1.85–7.62)
NEUTS SEG NFR BLD AUTO: 85 % (ref 43–75)
NRBC BLD AUTO-RTO: 0 /100 WBCS
PLATELET # BLD AUTO: 504 THOUSANDS/UL (ref 149–390)
PMV BLD AUTO: 9.3 FL (ref 8.9–12.7)
POTASSIUM SERPL-SCNC: 3.9 MMOL/L (ref 3.5–5.3)
PREALB SERPL-MCNC: 18.6 MG/DL (ref 18–40)
RBC # BLD AUTO: 3.41 MILLION/UL (ref 3.81–5.12)
SODIUM SERPL-SCNC: 142 MMOL/L (ref 136–145)
WBC # BLD AUTO: 19.44 THOUSAND/UL (ref 4.31–10.16)

## 2018-02-26 PROCEDURE — 85025 COMPLETE CBC W/AUTO DIFF WBC: CPT | Performed by: SURGERY

## 2018-02-26 PROCEDURE — 74240 X-RAY XM UPR GI TRC 1CNTRST: CPT

## 2018-02-26 PROCEDURE — 82948 REAGENT STRIP/BLOOD GLUCOSE: CPT

## 2018-02-26 PROCEDURE — 80048 BASIC METABOLIC PNL TOTAL CA: CPT | Performed by: SURGERY

## 2018-02-26 PROCEDURE — 99233 SBSQ HOSP IP/OBS HIGH 50: CPT | Performed by: INTERNAL MEDICINE

## 2018-02-26 PROCEDURE — 84134 ASSAY OF PREALBUMIN: CPT | Performed by: SURGERY

## 2018-02-26 PROCEDURE — C9113 INJ PANTOPRAZOLE SODIUM, VIA: HCPCS | Performed by: NURSE PRACTITIONER

## 2018-02-26 PROCEDURE — 97116 GAIT TRAINING THERAPY: CPT

## 2018-02-26 PROCEDURE — 97112 NEUROMUSCULAR REEDUCATION: CPT

## 2018-02-26 PROCEDURE — 99024 POSTOP FOLLOW-UP VISIT: CPT | Performed by: SURGERY

## 2018-02-26 RX ADMIN — IOHEXOL 25 ML: 350 INJECTION, SOLUTION INTRAVENOUS at 10:48

## 2018-02-26 RX ADMIN — HYDROMORPHONE HYDROCHLORIDE 1 MG: 1 INJECTION, SOLUTION INTRAMUSCULAR; INTRAVENOUS; SUBCUTANEOUS at 13:32

## 2018-02-26 RX ADMIN — HYDROMORPHONE HYDROCHLORIDE 1 MG: 1 INJECTION, SOLUTION INTRAMUSCULAR; INTRAVENOUS; SUBCUTANEOUS at 05:15

## 2018-02-26 RX ADMIN — HYDROMORPHONE HYDROCHLORIDE 1 MG: 1 INJECTION, SOLUTION INTRAMUSCULAR; INTRAVENOUS; SUBCUTANEOUS at 09:14

## 2018-02-26 RX ADMIN — FLUCONAZOLE 400 MG: 2 INJECTION INTRAVENOUS at 15:31

## 2018-02-26 RX ADMIN — HYDROMORPHONE HYDROCHLORIDE 1 MG: 1 INJECTION, SOLUTION INTRAMUSCULAR; INTRAVENOUS; SUBCUTANEOUS at 02:35

## 2018-02-26 RX ADMIN — HEPARIN SODIUM 5000 UNITS: 5000 INJECTION, SOLUTION INTRAVENOUS; SUBCUTANEOUS at 11:26

## 2018-02-26 RX ADMIN — ONDANSETRON 4 MG: 2 INJECTION INTRAMUSCULAR; INTRAVENOUS at 02:34

## 2018-02-26 RX ADMIN — HYDROMORPHONE HYDROCHLORIDE 1 MG: 1 INJECTION, SOLUTION INTRAMUSCULAR; INTRAVENOUS; SUBCUTANEOUS at 11:25

## 2018-02-26 RX ADMIN — HYDROMORPHONE HYDROCHLORIDE 1 MG: 1 INJECTION, SOLUTION INTRAMUSCULAR; INTRAVENOUS; SUBCUTANEOUS at 15:31

## 2018-02-26 RX ADMIN — INSULIN LISPRO 1 UNITS: 100 INJECTION, SOLUTION INTRAVENOUS; SUBCUTANEOUS at 18:24

## 2018-02-26 RX ADMIN — Medication 3 G: at 18:24

## 2018-02-26 RX ADMIN — METOPROLOL TARTRATE 10 MG: 1 INJECTION, SOLUTION INTRAVENOUS at 11:25

## 2018-02-26 RX ADMIN — Medication 3 G: at 12:50

## 2018-02-26 RX ADMIN — PANTOPRAZOLE SODIUM 40 MG: 40 INJECTION, POWDER, FOR SOLUTION INTRAVENOUS at 11:26

## 2018-02-26 RX ADMIN — Medication 3 G: at 05:00

## 2018-02-26 RX ADMIN — HEPARIN SODIUM 5000 UNITS: 5000 INJECTION, SOLUTION INTRAVENOUS; SUBCUTANEOUS at 20:38

## 2018-02-26 RX ADMIN — METOPROLOL TARTRATE 10 MG: 1 INJECTION, SOLUTION INTRAVENOUS at 18:24

## 2018-02-26 RX ADMIN — INSULIN LISPRO 1 UNITS: 100 INJECTION, SOLUTION INTRAVENOUS; SUBCUTANEOUS at 00:00

## 2018-02-26 RX ADMIN — HYDROMORPHONE HYDROCHLORIDE 1 MG: 1 INJECTION, SOLUTION INTRAMUSCULAR; INTRAVENOUS; SUBCUTANEOUS at 20:39

## 2018-02-26 RX ADMIN — HYDROMORPHONE HYDROCHLORIDE 1 MG: 1 INJECTION, SOLUTION INTRAMUSCULAR; INTRAVENOUS; SUBCUTANEOUS at 18:24

## 2018-02-26 RX ADMIN — METOPROLOL TARTRATE 10 MG: 1 INJECTION, SOLUTION INTRAVENOUS at 05:00

## 2018-02-26 RX ADMIN — INSULIN LISPRO 1 UNITS: 100 INJECTION, SOLUTION INTRAVENOUS; SUBCUTANEOUS at 05:33

## 2018-02-26 NOTE — PLAN OF CARE
Problem: PHYSICAL THERAPY ADULT  Goal: Performs mobility at highest level of function for planned discharge setting  See evaluation for individualized goals  Treatment/Interventions: Functional transfer training, LE strengthening/ROM, Therapeutic exercise, Endurance training, Bed mobility, Spoke to nursing  Equipment Recommended:  (R/O LEAST RESTRICTIVE AD )       See flowsheet documentation for full assessment, interventions and recommendations  Outcome: Progressing  Prognosis: Good  Problem List: Decreased strength, Decreased endurance, Impaired balance, Decreased mobility, Pain  Assessment: PT INITIATED TREATMENT SESSION IN ORDER TO ASSIST PATIENT IN ACHIEVING GOALS FOR BED MOBILITY, TRANSFERS, AND AMBULATION  PATIENT'S SELF-REPORTED GOAL IS TO WALK  PATIENT REQUIRED MIN-A X1 FOR SUPINE<-->SIT TRANSFER (LE MANAGEMENT)  PATIENT PERFORMED 4 SIT<-->STAND TRANSFERS WITH MIN-A X1 AND VERBAL CUES TO ROCK FORWARD AND KEEP NOSE OVER TOES  PATIENT REQUIRED MIN-A X1 (2ND PERSON FOR LINES & 3RD FOR CHAIR FOLLOW) TO AMBULATE 40 FT X2 W/ RW  GAIT PRESENTS WITH DECREASED STRIDE LENGTH, AND PATIENT REPORTS HER LEGS BEGIN TO "FEEL LIKE JELLO" AFTER WALKING  PATIENT DISPLAYS INCREASED ANXIETY ABOUT AMBULATION AND REQUIRED FREQUENT ENCOURAGEMENT TO PUSH HERSELF  PLAN FOR NEXT SESSION IS TO INCREASE AMBULATION DISTANCE  PATIENT REMAINS APPROPRIATE FOR D/C RECOMMENDATION OF INPT REHAB WHEN MEDICALLY APPROPRIATE  Barriers to Discharge: Decreased caregiver support, Inaccessible home environment     Recommendation: (S) Post acute IP rehab     PT - OK to Discharge: Yes (TO [facility] [facility]"" )    See flowsheet documentation for full assessment     Jonah Mckenna PT

## 2018-02-26 NOTE — PHYSICAL THERAPY NOTE
Physical Therapy Cancellation Note      PT ATTEMPTED TO SEE PATIENT FOR TREATMENT SESSION  PATIENT DECLINING AT THIS TIME SECONDARY TO PAIN AND FOR UPCOMING OFF FLOOR EVENT (BARIUM SWALLOW STUDY AT 9:30)  PT WILL RE-ATTEMPT IF TIME AND CONTINUE TO FOLLOW ON CASELOAD      Fransisca Apodaca, PT

## 2018-02-26 NOTE — CASE MANAGEMENT
Thank you,  [address] [facility] in the [facility] by Donald Villa for 2017  Network Utilization Review Department  Phone: [phone]; Fax [phone]  ATTENTION: The Network Utilization Review Department is now centralized for our 7 Facilities  Make a note that we have a new phone and fax numbers for our Department  Please call with any questions or concerns to [phone] and carefully follow the prompts so that you are directed to the right person  All voicemails are confidential  Fax any determinations, approvals, denials, and requests for initial or continue stay review clinical to [phone]  Due to HIGH CALL volume, it would be easier if you could please send faxed requests to expedite your requests and in part, help us provide discharge notifications faster  Continued Stay Review    Date: 2/25/18    Vital Signs: BP (P) 163/96 (BP Location: Left arm)   Pulse (P) 100   Temp (P) 98 3 °F (36 8 °C) (Oral)   Resp (P) 18   Ht 5' 4" (1 626 m)   Wt 65 8 kg (145 lb 1 oz)   LMP  (LMP Unknown)   SpO2 (P) 98%   Breastfeeding?  No   BMI 24 90 kg/m²     Medications:   Scheduled Meds:   Current Facility-Administered Medications:  acetaminophen 975 mg Oral [facility] Hari Hicks PA-C    ampicillin-sulbactam 3 g Intravenous Q6H Gilles Adhikari MD Last Rate: 3 g (02/26/18 1250)   bisacodyl 10 mg Rectal Daily Aries Euceda MD    fluconazole 400 mg Intravenous Q24H ABNER Hogue Last Rate: 400 mg (02/25/18 1644)   gabapentin 300 mg Oral HS Larissa Coon MD    heparin (porcine) 5,000 Units Subcutaneous Q12H [address] Larissa Coon MD    HYDROmorphone 0 5 mg Intravenous Q1H PRN Aries Euceda MD    HYDROmorphone 1 mg Intravenous Q2H PRN Aries Euceda MD    insulin lispro 1-5 Units Subcutaneous Q6H [address] Nayla Mcconnell PA-C    iohexol 50 mL Oral 90 min pre-procedure Jaki Lara MD    iohexol 50 mL Oral 90 min pre-procedure Jaki Lara MD    iohexol 50 mL Oral 60 Min Pre-Op Marciaraven Bellamy PA-C    iohexol 50 mL Intravenous Once in imaging Dean Goode MD    menthol-methyl salicylate  Apply externally 4x Daily PRN Tania Cosby MD    metoprolol 10 mg Intravenous Q6H John Givens MD    mirtazapine 15 mg Oral HS Jj Junior PA-C    nortriptyline 10 mg Oral BID John Givens MD    ondansetron 4 mg Intravenous Q4H PRN Scar Galeas MD    pantoprazole 40 mg Intravenous Q24H [address] ABNER Tse      Continuous Infusions:    PRN Meds: HYDROmorphone    HYDROmorphone    iohexol    menthol-methyl salicylate    ondansetron    Abnormal Labs/Diagnostic Results:    02/25/18 0503   Potassium 3 3     CO2 35     Creatinine 0 49     Glucose 211     Calcium 10 4        02/25/18 0503   WBC 15 80     RBC 3 19     Hemoglobin 9 1     Hematocrit 29 6     MCHC 30 7     RDW 15 9     Platelets 060     Neutrophils Relative 86     Lymphocytes Relative 6     Neutrophils Absolute 13 56     POC glucose 196, 215, 142, 193    Age/Sex: 36 y o  female     Assessment/Plan:   2/25 Surgery Progress Note   36 F with gastric perforation after hiatal hernia repair, s/p ex-lap and washout, repair of gastric perforation, EGD with stenting, IR drainage, EGD and repeat stenting x2     Plan:  Continue NPO  Saline lock  Tube feeds at goal  Continue drains  WBC slightly elevated, 15 8 today from 14 4  Replete electrolytes  Abx per ID  OOB, pulmonary toilet  ______________________  2/25 ID Progress Note  1   Intra-abdominal/pelvic abscesses   Patient is status post multiple abdominal washouts   She has multiple drains in place   She is status post placement of drainage in a new perisplenic collection   She is clinically well and systemically stable   Operative cultures consistently growing  only ampicillin susceptible Enterococcus   A single culture also grew Saccharomyces, most likely colonization   Patient had a new drain placed in left abdomen on 02/16   Culture this drainage is growing Enterococcus again, with no other pathogens   WBC decreased after stent revision    Continue with IV Unasyn/fluconazole  Continue drainage  Treat x at least 14 days from last drainage, another 6 days      2   Gastric perforation, status post repair, with persistent leak   She is now status post placement of esophageal stent   Barium swallow from today showed persistent leak   Patient is status post esophageal stent exchange with persistent leak   She is now status post a new stent inside initial stent   WBC decreased afterwards  Monitor for recurrent leak      3   Persistent leukocytosis   WBC has improved but still remains i elevated   This is most likely related to persistent gastric leaking   Will continue antibiotic   Will continue to monitor WBC    WBC decreasing after stent revision  Antibiotic plan as in above  Monitor WBC and temperature closely        4   Possible infected  shunt   Given presence of tip of  shunt in peritoneal space, there is high probability of  shunt infection   Fortunately, patient has no symptoms concerning for meningitis   She has neurological deficits   She is status post tapping of  shunt at [facility]   CSF culture there had no growth but patient had prior antibiotic  Tressa Perezdy will go ahead and treat her for possible/presumptive  shunt infection   Patient is now status post  shunt resection   CSF culture here also negative  Cleo Norwood completed 2 weeks of antibiotic after shunt removal     No further antibiotic needed for this      5   Bilateral pleural effusion, status post chest tube placement   This is most likely sympathetic effusion, secondary to intra-abdominal abscesses   Chest tubes have been removed   Patient remains comfortable      6   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   No clinical signs of cellulitis   Patient has no headache    Wound is now sutured   Drainage/leakage appears to be resolving    Monitor      Discussed with the patient in detail regarding above plan      Antibiotics:  Unasyn  Fluconazole # 26  POD # 30  Post/drainage # 8    Discharge Plan: TBD

## 2018-02-26 NOTE — PHYSICAL THERAPY NOTE
PT TREATMENT       02/26/18 1426   Pain Assessment   Pain Assessment FLACC   Pain Rating: FLACC (Rest) - Face 0   Pain Rating: FLACC (Rest) - Legs 0   Pain Rating: FLACC (Rest) - Activity 0   Pain Rating: FLACC (Rest) - Cry 0   Pain Rating: FLACC (Rest) - Consolability 0   Score: FLACC (Rest) 0   Pain Rating: FLACC (Activity) - Face 1   Pain Rating: FLACC (Activity) - Legs 0   Pain Rating: FLACC (Activity) - Activity 0   Pain Rating: FLACC (Activity) - Cry 1   Pain Rating: FLACC (Activity) - Consolability 1   Score: FLACC (Activity) 3   Restrictions/Precautions   Weight Bearing Precautions Per Order No   Braces or Orthoses (NONE)   Other Precautions Multiple lines;Pain;O2;Telemetry   General   Chart Reviewed Yes   Response to Previous Treatment Patient with no complaints from previous session  Family/Caregiver Present Yes   Cognition   Overall Cognitive Status WFL   Arousal/Participation Responsive;Lethargic   Attention Within functional limits   Orientation Level Oriented X4   Following Commands Follows all commands and directions without difficulty   Comments PATIENT PRESENTS WITH FLAT AFFECT AND ANXIETY ABOUT MOBILITY    Subjective   Subjective "MY LEGS FEEL LIKE JELLO"   Bed Mobility   Supine to Sit 4  Minimal assistance   Additional items Assist x 1;HOB elevated; Increased time required;Verbal cues; Bedrails   Sit to Supine 4  Minimal assistance   Additional items Assist x 1;HOB elevated; Increased time required;LE management   Transfers   Sit to Stand 4  Minimal assistance   Additional items Assist x 1; Increased time required;Verbal cues   Stand to Sit 4  Minimal assistance   Additional items Assist x 1; Increased time required;Verbal cues   Ambulation/Elevation   Gait pattern Excessively slow; Short stride;Decreased foot clearance   Gait Assistance 4  Minimal assist   Additional items Assist x 1;Verbal cues   Assistive Device Rolling walker   Distance 40 FT X 2 W/ CHAIR FOLLOW    Stair Management Assistance Not tested   Balance   Static Sitting Fair   Dynamic Sitting Fair   Static Standing Fair -   Dynamic Standing Poor +   Ambulatory Poor +   Endurance Deficit   Endurance Deficit Yes   Endurance Deficit Description PAIN, DECREASED ACTIVITY TOLERANCE    Activity Tolerance   Activity Tolerance Patient limited by fatigue;Patient limited by pain   Nurse Made Aware RN ANDREW    Assessment   Prognosis Good   Problem List Decreased strength;Decreased endurance; Impaired balance;Decreased mobility;Pain   Assessment PT INITIATED TREATMENT SESSION IN ORDER TO ASSIST PATIENT IN ACHIEVING GOALS FOR BED MOBILITY, TRANSFERS, AND AMBULATION  PATIENT'S SELF-REPORTED GOAL IS TO WALK  PATIENT REQUIRED MIN-A X1 FOR SUPINE<-->SIT TRANSFER (LE MANAGEMENT)  PATIENT PERFORMED 4 SIT<-->STAND TRANSFERS WITH MIN-A X1 AND VERBAL CUES TO ROCK FORWARD AND KEEP NOSE OVER TOES  PATIENT REQUIRED MIN-A X1 (2ND PERSON FOR LINES & 3RD FOR CHAIR FOLLOW) TO AMBULATE 40 FT X2 W/ RW  GAIT PRESENTS WITH DECREASED STRIDE LENGTH, AND PATIENT REPORTS HER LEGS BEGIN TO "FEEL LIKE JELLO" AFTER WALKING  PATIENT DISPLAYS INCREASED ANXIETY ABOUT AMBULATION AND REQUIRED FREQUENT ENCOURAGEMENT TO PUSH HERSELF  PLAN FOR NEXT SESSION IS TO INCREASE AMBULATION DISTANCE  PATIENT REMAINS APPROPRIATE FOR D/C RECOMMENDATION OF INPT REHAB WHEN MEDICALLY APPROPRIATE  Goals   Patient Goals TO WALK    LTG Expiration Date 03/12/18   Long Term Goal #1 10-14 DAYS: 1) MOD-I FOR BED MOBILITY; 2) MOD-I FOR SIT<-->STAND TRANSFERS; 3) MOD-I TO AMBULATE 150 FT W/ RW; 4) INCREASE B/L LE STRENGTH BY 1/2 GRADE; 5) IMPROVE DYNAMIC BALANCE BY 1/2 GRADE; 6) INCREASE ACTIVITY TOLERANCE TO 30 MINUTES    Treatment Day 7   Plan   Treatment/Interventions Functional transfer training;LE strengthening/ROM; Therapeutic exercise; Endurance training;Bed mobility;Gait training;Spoke to nursing   Progress Slow progress, decreased activity tolerance   PT Frequency 5x/wk   Recommendation   Recommendation Post acute IP rehab   Equipment Recommended Garry Armas   PT - OK to Discharge Yes  (TO [facility] [facility] )     Andie Mcgee, SPT

## 2018-02-26 NOTE — PROGRESS NOTES
Progress Note - Acute Care Surgery   Dede American 36 y o  female MRN: [id number]  Unit/Bed#: [facility] 834-01 Encounter: [id number]    Assessment:  42F with gastric perforation after hiatal hernia repair, s/p ex-lap and washout, repair of gastric perforation, EGD with stenting, IR drainage, EGD and repeat stenting x2  Plan:  - NPO  - continue tube feeds at goal  - f/u esophagram today  - JPs to suction, monitor output  - trend wbc  - abx to continue per ID  - OOB/ambulate  - SQH/SCDs      Subjective/Objective     Subjective: Having loose BM, tolerating tube feeds  Abd pain overall improving, feels like she has something stuck in her throat  Objective:    Blood pressure 160/76, pulse 100, temperature 98 1 °F (36 7 °C), temperature source Axillary, resp  rate 18, height 5' 4" (1 626 m), weight 65 8 kg (145 lb 1 oz), SpO2 95 %, not currently breastfeeding  ,Body mass index is 24 9 kg/m²        Intake/Output Summary (Last 24 hours) at 02/26/18 9742  Last data filed at 02/26/18 0201   Gross per 24 hour   Intake               50 ml   Output             3370 ml   Net            -3320 ml       Invasive Devices     Peripherally Inserted Central Catheter Line            PICC Line 74/07/33 Right Basilic 36 days          Drain            Closed/Suction Drain Midline;Superior Abdomen Other (Comment) 10 Fr  9 days    Closed/Suction Drain Left;Lateral LUQ Bulb 12 Fr  2 days    NG/OG/Enteral Tube Nasogastric 8 Fr Left nares 2 days                Physical Exam:   NAD  Norm resp effort  Mildly tachycardic to low 100's  Chest incision cdi  Abd soft, slightly distended, NT, still with some subq air, drains in place serous to serosang        Results from last 7 days  Lab Units 02/25/18  0503 02/24/18  0918 02/23/18  0454   WBC Thousand/uL 15 80* 14 43* 18 84*   HEMOGLOBIN g/dL 9 1* 9 4* 9 7*   HEMATOCRIT % 29 6* 29 9* 31 8*   PLATELETS Thousands/uL 526* 500* 458*       Results from last 7 days  Lab Units 02/25/18  0503 02/23/18  0454 02/22/18  0610   SODIUM mmol/L 142 141 141   POTASSIUM mmol/L 3 3* 3 8 3 4*   CHLORIDE mmol/L 101 101 101   CO2 mmol/L 35* 35* 37*   BUN mg/dL 18 12 15   CREATININE mg/dL 0 49* 0 34* 0 37*   GLUCOSE RANDOM mg/dL 211* 119 167*   CALCIUM mg/dL 10 4* 10 4* 9 8

## 2018-02-26 NOTE — PROGRESS NOTES
Progress Note - Thoracic Surgery   Mars Mortimer 36 y o  female MRN: [id number]  Unit/Bed#: [facility] 834-01 Encounter: [id number]    Assessment:  36y o -year-old female with gastric perforation s/p hiatal hernia repair at outside hospital, s/p esophageal stent placement, exploratory laparotomy and washout  Has had persistent esophageal/gastric leak on imaging  Had bilateral pleural effusions which were drained  Perisplenic fluid collection was drained  Most recently with EGD & stent placement 2/23    Plan:  Swallow study today  Continue TF at goal w/ NPO  Antibiotics per ID- Unasyn / Diflucan- another 5 days  Monitor WBC  OOB, pulm toilet  PRN pain control  Continue ADENIKE x 2 to bulb suction        Subjective/Objective   Chief Complaint: None    Subjective: Has some lower chest discomfort, feels like she has a "pinecone" stuck in her chest- says she felt this way after the previous stent too  Otherwise no N/V, + bowel function    Objective:     Blood pressure 160/76, pulse 100, temperature 98 1 °F (36 7 °C), temperature source Axillary, resp  rate 18, height 5' 4" (1 626 m), weight 65 8 kg (145 lb 1 oz), SpO2 95 %, not currently breastfeeding  ,Body mass index is 24 9 kg/m²  Intake/Output Summary (Last 24 hours) at 02/26/18 0765  Last data filed at 02/26/18 0201   Gross per 24 hour   Intake               50 ml   Output             3370 ml   Net            -3320 ml       Invasive Devices     Peripherally Inserted Central Catheter Line            PICC Line 50/75/67 Right Basilic 36 days          Drain            Closed/Suction Drain Midline;Superior Abdomen Other (Comment) 10 Fr  9 days    Closed/Suction Drain Left;Lateral LUQ Bulb 12 Fr  2 days    NG/OG/Enteral Tube Nasogastric 8 Fr Left nares 2 days                Physical Exam:   Gen: A&O, NAD  Cardio: RRR  Lungs: CTAB  Abd: Soft, non distended, non tender  Upper midline IR drain, no fluid in bulb currently   L flank ADENIKE    Lab, Imaging and other studies:CBC: No results found for: WBC, HGB, HCT, MCV, PLT, ADJUSTEDWBC, MCH, MCHC, RDW, MPV, NRBC, CMP: No results found for: NA, K, CL, CO2, ANIONGAP, BUN, CREATININE, GLUCOSE, CALCIUM, AST, ALT, ALKPHOS, PROT, ALBUMIN, BILITOT, EGFR  VTE Pharmacologic Prophylaxis: Heparin  VTE Mechanical Prophylaxis: sequential compression device

## 2018-02-27 LAB
GLUCOSE SERPL-MCNC: 166 MG/DL (ref 65–140)
GLUCOSE SERPL-MCNC: 203 MG/DL (ref 65–140)
GLUCOSE SERPL-MCNC: 207 MG/DL (ref 65–140)

## 2018-02-27 PROCEDURE — 97116 GAIT TRAINING THERAPY: CPT

## 2018-02-27 PROCEDURE — 82948 REAGENT STRIP/BLOOD GLUCOSE: CPT

## 2018-02-27 PROCEDURE — 97535 SELF CARE MNGMENT TRAINING: CPT

## 2018-02-27 PROCEDURE — 97110 THERAPEUTIC EXERCISES: CPT

## 2018-02-27 PROCEDURE — C9113 INJ PANTOPRAZOLE SODIUM, VIA: HCPCS | Performed by: NURSE PRACTITIONER

## 2018-02-27 PROCEDURE — 99231 SBSQ HOSP IP/OBS SF/LOW 25: CPT | Performed by: THORACIC SURGERY (CARDIOTHORACIC VASCULAR SURGERY)

## 2018-02-27 PROCEDURE — 99024 POSTOP FOLLOW-UP VISIT: CPT | Performed by: SURGERY

## 2018-02-27 PROCEDURE — 99233 SBSQ HOSP IP/OBS HIGH 50: CPT | Performed by: INTERNAL MEDICINE

## 2018-02-27 RX ORDER — METOPROLOL TARTRATE 5 MG/5ML
INJECTION INTRAVENOUS
Status: COMPLETED
Start: 2018-02-27 | End: 2018-02-27

## 2018-02-27 RX ADMIN — Medication 3 G: at 00:07

## 2018-02-27 RX ADMIN — METOPROLOL TARTRATE 10 MG: 1 INJECTION, SOLUTION INTRAVENOUS at 05:09

## 2018-02-27 RX ADMIN — INSULIN LISPRO 1 UNITS: 100 INJECTION, SOLUTION INTRAVENOUS; SUBCUTANEOUS at 18:29

## 2018-02-27 RX ADMIN — HYDROMORPHONE HYDROCHLORIDE 1 MG: 1 INJECTION, SOLUTION INTRAMUSCULAR; INTRAVENOUS; SUBCUTANEOUS at 12:39

## 2018-02-27 RX ADMIN — INSULIN LISPRO 1 UNITS: 100 INJECTION, SOLUTION INTRAVENOUS; SUBCUTANEOUS at 00:34

## 2018-02-27 RX ADMIN — METOPROLOL TARTRATE 10 MG: 5 INJECTION, SOLUTION INTRAVENOUS at 00:33

## 2018-02-27 RX ADMIN — HYDROMORPHONE HYDROCHLORIDE 1 MG: 1 INJECTION, SOLUTION INTRAMUSCULAR; INTRAVENOUS; SUBCUTANEOUS at 22:11

## 2018-02-27 RX ADMIN — METOPROLOL TARTRATE 10 MG: 1 INJECTION, SOLUTION INTRAVENOUS at 18:07

## 2018-02-27 RX ADMIN — HYDROMORPHONE HYDROCHLORIDE 1 MG: 1 INJECTION, SOLUTION INTRAMUSCULAR; INTRAVENOUS; SUBCUTANEOUS at 02:23

## 2018-02-27 RX ADMIN — HYDROMORPHONE HYDROCHLORIDE 1 MG: 1 INJECTION, SOLUTION INTRAMUSCULAR; INTRAVENOUS; SUBCUTANEOUS at 05:10

## 2018-02-27 RX ADMIN — HYDROMORPHONE HYDROCHLORIDE 1 MG: 1 INJECTION, SOLUTION INTRAMUSCULAR; INTRAVENOUS; SUBCUTANEOUS at 07:31

## 2018-02-27 RX ADMIN — Medication 3 G: at 23:54

## 2018-02-27 RX ADMIN — INSULIN LISPRO 1 UNITS: 100 INJECTION, SOLUTION INTRAVENOUS; SUBCUTANEOUS at 12:46

## 2018-02-27 RX ADMIN — HEPARIN SODIUM 5000 UNITS: 5000 INJECTION, SOLUTION INTRAVENOUS; SUBCUTANEOUS at 07:43

## 2018-02-27 RX ADMIN — ONDANSETRON 4 MG: 2 INJECTION INTRAMUSCULAR; INTRAVENOUS at 05:21

## 2018-02-27 RX ADMIN — HYDROMORPHONE HYDROCHLORIDE 1 MG: 1 INJECTION, SOLUTION INTRAMUSCULAR; INTRAVENOUS; SUBCUTANEOUS at 09:36

## 2018-02-27 RX ADMIN — HEPARIN SODIUM 5000 UNITS: 5000 INJECTION, SOLUTION INTRAVENOUS; SUBCUTANEOUS at 22:11

## 2018-02-27 RX ADMIN — FLUCONAZOLE 400 MG: 2 INJECTION INTRAVENOUS at 18:06

## 2018-02-27 RX ADMIN — Medication 3 G: at 05:09

## 2018-02-27 RX ADMIN — METOPROLOL TARTRATE 10 MG: 1 INJECTION, SOLUTION INTRAVENOUS at 00:33

## 2018-02-27 RX ADMIN — ONDANSETRON 4 MG: 2 INJECTION INTRAMUSCULAR; INTRAVENOUS at 09:36

## 2018-02-27 RX ADMIN — HYDROMORPHONE HYDROCHLORIDE 1 MG: 1 INJECTION, SOLUTION INTRAMUSCULAR; INTRAVENOUS; SUBCUTANEOUS at 00:03

## 2018-02-27 RX ADMIN — Medication 3 G: at 12:40

## 2018-02-27 RX ADMIN — ONDANSETRON 4 MG: 2 INJECTION INTRAMUSCULAR; INTRAVENOUS at 18:24

## 2018-02-27 RX ADMIN — INSULIN LISPRO 1 UNITS: 100 INJECTION, SOLUTION INTRAVENOUS; SUBCUTANEOUS at 06:19

## 2018-02-27 RX ADMIN — METOPROLOL TARTRATE 10 MG: 1 INJECTION, SOLUTION INTRAVENOUS at 12:40

## 2018-02-27 RX ADMIN — PANTOPRAZOLE SODIUM 40 MG: 40 INJECTION, POWDER, FOR SOLUTION INTRAVENOUS at 07:32

## 2018-02-27 RX ADMIN — HYDROMORPHONE HYDROCHLORIDE 1 MG: 1 INJECTION, SOLUTION INTRAMUSCULAR; INTRAVENOUS; SUBCUTANEOUS at 18:07

## 2018-02-27 RX ADMIN — INSULIN LISPRO 1 UNITS: 100 INJECTION, SOLUTION INTRAVENOUS; SUBCUTANEOUS at 23:59

## 2018-02-27 RX ADMIN — Medication 3 G: at 18:15

## 2018-02-27 RX ADMIN — HYDROMORPHONE HYDROCHLORIDE 1 MG: 1 INJECTION, SOLUTION INTRAMUSCULAR; INTRAVENOUS; SUBCUTANEOUS at 14:40

## 2018-02-27 NOTE — PHYSICAL THERAPY NOTE
Physical Therapy Treatment    Patient Name: Hayden Robison    DJBZC'K Date: 2/27/2018     Problem List  Patient Active Problem List   Diagnosis    Gastric leak    Acute peritonitis    Idiopathic intracranial hypertension    S/P  shunt     (ventriculoperitoneal) shunt status    Murmur, cardiac    Refusal of blood product    Gastroesophageal reflux disease    Choroidal nevus, right eye    Moderate protein-calorie malnutrition (HCC)    Esophageal anastomotic leak        Past Medical History  History reviewed  No pertinent past medical history  Past Surgical History  Past Surgical History:   Procedure Laterality Date    ESOPHAGOGASTRODUODENOSCOPY N/A 2/23/2018    Procedure: ESOPHAGOGASTRODUODENOSCOPY (EGD) WITH ESOPHAGEAL STENT PLACEMENT;  Surgeon: Janes Campuzano MD;  [facility];  Service: Thoracic    ESOPHAGOGASTRODUODENOSCOPY N/A 2/23/2018    Procedure: ESOPHAGOGASTRODUODENOSCOPY (EGD) WITH REMOVAL ESOPHAGEAL STENT  AND REPLACEMENT WITH 23mm X155mm [address];  Surgeon: Janes Campuzano MD;  [facility];  Service: Thoracic    LAPAROTOMY N/A 1/25/2018    Procedure: Exploratory Laparotomy, wash out,placement of drains, placement of NG feeding tube ;   Surgeon: Kandice Brunner, DO;  [facility];  Service: General    LA Vicky Median CSF SHUNT,W/O REPLACE Right 2/5/2018    Procedure: Removal of  shunt;  Surgeon: Josie Duff MD;  [facility];  Service: Neurosurgery    LA REPLACEMENT/REVISION,CSF SHUNT Right 1/25/2018    Procedure: Externalization of right-sided SHUNT VENTRICULAR-PERITONEAL in anterior chest wall ribs two and three level  ;  Surgeon: Mars Orlando MD;  [facility];  Service: Neurosurgery        02/27/18 1240   Pain Assessment   Pain Assessment 0-10   Pain Score 8   Pain Type Chronic pain   Pain Location Abdomen   Pain Orientation Mid;Upper   Pain Descriptors Aching;Stabbing   Pain Frequency Intermittent   Pain Onset Ongoing   Clinical Progression Not changed   Patient's Stated Pain Goal No pain   Hospital Pain Intervention(s) Ambulation/increased activity; Emotional support;Relaxation technique   Response to Interventions tolerates   General   Chart Reviewed Yes   Response to Previous Treatment Patient reporting fatigue but able to participate  Family/Caregiver Present No   Cognition   Arousal/Participation Poorly responsive  (flat- minimal verbalizations)   Attention Attends with cues to redirect   Orientation Level Oriented X4   Memory Within functional limits   Following Commands Follows one step commands without difficulty   Comments flat affect; minimal verbalizations- requires max cues and encouragement for participation   Subjective   Subjective "I'm not sure if I can do this"   Bed Mobility   Supine to Sit 3  Moderate assistance   Additional items Assist x 1   Transfers   Sit to Stand 4  Minimal assistance   Additional items Assist x 1; Increased time required;Verbal cues  (another present for lines and safety)   Stand to Sit 4  Minimal assistance  (A of another   for lines and safety)   Additional items Assist x 1; Increased time required;Verbal cues   Stand pivot 3  Moderate assistance   Additional items Assist x 1; Increased time required;Verbal cues   Ambulation/Elevation   Gait pattern Excessively slow   Gait Assistance 3  Moderate assist   Additional items Assist x 1  (level of assit varies from min>modA depending on fatigue lev)   Assistive Device Rolling walker   Distance 45' x2 w/ RW min/modA and chair follow for safety- pt w/ knee hyperextension in stance phase for stabiltiy- sits impulsively when fat igued and chair follow and A of 2-3 recommended for lines / chair recommended for progression    Stair Management Assistance Not tested  (nont appropriate 2* deconditioning and quad weakness)   Balance   Static Sitting Fair   Dynamic Sitting Fair   Static Standing Poor +   Dynamic Standing Poor +   Ambulatory Poor +   Endurance Deficit   Endurance Deficit Yes   Endurance Deficit Description severe deconditioning    Activity Tolerance   Activity Tolerance Patient limited by fatigue;Patient limited by pain   Nurse Made Aware RN aware- s/w Chanda Daniel - resident    Exercises   Glute Sets Sitting;10 reps;Right;Left  (x3)   Hip Flexion Sitting;10 reps;Right;Left  (x3)   Knee AROM Long Arc Quad Sitting;10 reps;Right;Left  (x2)   Balance training  sit< stand from chair x  4 reps   Assessment   Prognosis Fair   Problem List Decreased strength;Decreased range of motion;Decreased endurance; Impaired balance;Decreased mobility; Decreased coordination; Impaired judgement;Decreased safety awareness; Obesity; Impaired tone;Pain;Decreased skin integrity   Assessment Pt tx session for gait training and therex- pt continues to require max cues adn encouragemetn fr participation and motivation- anxiety; pain and ?fear limiting- ambualtion w/ RW 39' x2 w/ chair follow- therex as documented w/o complaints- pt encouraged to complete seated HEP at min 3x throughout the day for LE strengthening- PT continuing to recommend inpt rehab on d/c as pt remins profoundly weak and deconditioned- all needs in reach post session  Barriers to Discharge Inaccessible home environment;Decreased caregiver support   Goals   Patient Goals none expressed    LTG Expiration Date 03/12/18   Treatment Day 8   Plan   Treatment/Interventions Functional transfer training;LE strengthening/ROM; Therapeutic exercise; Endurance training;Patient/family training;Equipment eval/education; Bed mobility;Gait training;Spoke to advanced practitioner;Spoke to case management;Spoke to nursing;OT   Progress Slow progress, decreased activity tolerance   PT Frequency 5x/wk   Recommendation   Recommendation Post acute IP rehab   Equipment Recommended Walker   PT - OK to Discharge (to rehab)      Bernie Stewart, PT

## 2018-02-27 NOTE — PLAN OF CARE
Problem: OCCUPATIONAL THERAPY ADULT  Goal: Performs self-care activities at highest level of function for planned discharge setting  See evaluation for individualized goals  Treatment Interventions: ADL retraining, Functional transfer training, Endurance training, Patient/family training, Equipment evaluation/education, Compensatory technique education, Activityengagement          See flowsheet documentation for full assessment, interventions and recommendations  Limitation: Decreased ADL status, Decreased self-care trans, Decreased high-level ADLs, Decreased endurance  Prognosis: Good  Assessment: Pt engaged in OT session with focus on self care  Pt requires extended time to complete sponge bathing due to fatigue and pain  Pt reports pain severe in abdomen with any movement  Pt completed supine bathing due to pain  Pt continues with limited dynamic reach below waist due to pain  Pt would benefit from use of LHAE for LB bathing and dressing  Pt requires several rest breaks during activity to initiate deep breathing for pain management  Pt able to transfer using log roll technique to EOB using breathing to manage pain  Pt required min A x2 for sit to stand due to pain  Pt with w/c follow during fxnl mobility due to sudden fatigue  Pt overall tolerated session fair  Pt will need inpatient rehab to maxmize independence and return to PLOF     Recommendation:  (PT MAY BENEFIT FROM NEUROPSYCH CONSULT )  OT Discharge Recommendation: Short Term Rehab  OT - OK to Discharge: Yes (to STR when medically stable)

## 2018-02-27 NOTE — POST OP PROGRESS NOTES
Progress Note - Neurosurgery   Luis Bates 36 y o  female MRN: [id number]  Unit/Bed#: [facility] 834-01 Encounter: [id number]    Assessment:  1  POD22 removal of VPS (2/5/18)  2  Incisional drainage - resolved  3  History of ventriculoperitoneal shunt for idiopathic intracranial hypertension (originally placed May 30, 2017)  4  Left upper extremity occlusive thrombophlebitis cephalic vein  5  Intra-abdominal/pelvic abscesses  6  Gastric perforation status post repair with persistent leak    Plan:  23-year-old female transfer [facility] for esophageal stent status post upper gastric injury during laparoscopic paraesophageal hernia repair  Patient developed sepsis and required multiple operative procedures  Upon transfer to [facility], she underwent a esophageal stent and externalization ventriculoperitoneal shunt which has since been completed removed  Patient developed drainage from right anterior incision which is since resolved and healed well  · Exam reveals with diffuse weakness throughout  Voice soft but appropriate  Interactive  Following commands  · Scalp Incision CDI without erythema edema or drainage  · Right chest incision clean dry intact  sutures placed 2/13/18 - removed today without complications  No erythema edema or drainage  No subcutaneous collections noted  Incision is flat  · Pain control per primary team   · Ongoing medical management per primary team   Drain management  Trend white count and temperature profile  Afebrile, WBC 19 44 (2/26/18)  · Infectious disease following - continue on IV Unasyn and fluconazole another 3 days  · Mobilized out of bed  Encouraged mobilization out of bed  PT/OT - recommending rehab  · DVT PPX:  Heparin and SCDs on during exam   · No further neurosurgical intervention anticipated at this juncture     · Patient may require lumbar puncture for opening and closing pressure if she develops any collections, further incisional drainage, headaches or vision changes  No HA or vision complaints  · Consider neurology evaluation for consideration of medical treatment for pseudotumor cerebri since shunt has been removed  · Will see as needed during remainder of hospitalization  Sign-off  · Call if any questions or concerns including headaches, vision changes or incisional drainage  Subjective/Objective   Chief Complaint: "I hanging in"/ postoperative follow-up    Subjective:  Patient denies headache or vision changes  C/o fatigue  OOB with PT/OT  Objective: Sitting up in chair  NAD  I/O       02/25 0701 - 02/26 0700 02/26 0701 - 02/27 0700 02/27 0701 - 02/28 0700    P  O  0 0 0    I V  (mL/kg)       NG/GT  135 135    IV Piggyback  100     Feedings 50 1120     Total Intake(mL/kg) 50 (0 8) 1355 (20 6) 135 (2 2)    Urine (mL/kg/hr) 2750 (1 7) 2950 (1 9) 250 (0 6)    Drains 150 (0 1) 90 (0 1)     Stool  0 (0)     Total Output 2900 3040 250    Net -2850 -1685 -115           Unmeasured Stool Occurrence  0 x           Invasive Devices     Peripherally Inserted Central Catheter Line            PICC Line 23/25/81 Right Basilic 37 days          Drain            Closed/Suction Drain Midline;Superior Abdomen Other (Comment) 10 Fr  10 days    Closed/Suction Drain Left;Lateral LUQ Bulb 12 Fr  3 days    NG/OG/Enteral Tube Nasogastric 8 Fr Left nares 3 days                Physical Exam:  Vitals: Blood pressure 158/86, pulse (!) 114, temperature 99 2 °F (37 3 °C), temperature source Oral, resp  rate 20, height 5' 4" (1 626 m), weight 60 8 kg (134 lb 0 6 oz), SpO2 96 %, not currently breastfeeding  ,Body mass index is 23 01 kg/m²      General appearance:  Arousable to voice, appears stated age, cooperative and no distress  Head: Normocephalic, without obvious abnormality, incision well healed  Eyes: EOMI  Neck: supple, symmetrical, trachea midline   Lungs: non labored breathing  Heart:  Tachycardia  Neurologic:   Mental status: Alert, oriented, thought content appropriate, speech clear and fluent  Follows commands  Motor: moving all extremities     Lab Results:    Results from last 7 days  Lab Units 02/26/18  0503 02/25/18  0503 02/24/18  0918   WBC Thousand/uL 19 44* 15 80* 14 43*   HEMOGLOBIN g/dL 9 5* 9 1* 9 4*   HEMATOCRIT % 31 9* 29 6* 29 9*   PLATELETS Thousands/uL 504* 526* 500*   NEUTROS PCT % 85* 86* 82*   MONOS PCT % 5 6 7       Results from last 7 days  Lab Units 02/26/18  0503 02/25/18  0503 02/23/18  0454  02/21/18  0505   SODIUM mmol/L 142 142 141  < > 144   POTASSIUM mmol/L 3 9 3 3* 3 8  < > 3 2*   CHLORIDE mmol/L 103 101 101  < > 103   CO2 mmol/L 36* 35* 35*  < > 35*   BUN mg/dL 14 18 12  < > 14   CREATININE mg/dL 0 42* 0 49* 0 34*  < > 0 36*   CALCIUM mg/dL 10 0 10 4* 10 4*  < > 9 3   TOTAL PROTEIN g/dL  --   --   --   --  5 8*   BILIRUBIN TOTAL mg/dL  --   --   --   --  0 22   ALK PHOS U/L  --   --   --   --  83   ALT U/L  --   --   --   --  31   AST U/L  --   --   --   --  23   GLUCOSE RANDOM mg/dL 178* 211* 119  < > 175*   < > = values in this interval not displayed  Results from last 7 days  Lab Units 02/25/18  0503 02/23/18  0454 02/22/18  0610   MAGNESIUM mg/dL 2 1 2 1 2 2             No results found for: TROPONINT  ABG:  Lab Results   Component Value Date    PHART 7 484 (H) 01/25/2018    VWR5EAB 33 6 (L) 01/25/2018    PO2ART 165 1 (H) 01/25/2018    AZG0WCU 24 7 01/25/2018    BEART 1 6 01/25/2018    SOURCE Line, Arterial 01/25/2018       Imaging Studies: I have personally reviewed pertinent reports  and I have personally reviewed pertinent films in PACS    EKG, Pathology, and Other Studies: I have personally reviewed pertinent reports        VTE Pharmacologic Prophylaxis: Heparin    VTE Mechanical Prophylaxis: sequential compression device

## 2018-02-27 NOTE — PROGRESS NOTES
Progress Note - Infectious Disease   Akila Perez 36 y o  female MRN: [id number]  Unit/Bed#: [facility] 834-01 Encounter: [id number]      Impression/Recommendations:  1   Intra-abdominal/pelvic abscesses   Patient is status post multiple abdominal washouts   She has multiple drains in place   She is status post placement of drainage in a new perisplenic collection   She is clinically well and systemically stable  Operative cultures consistently growing  only ampicillin susceptible Enterococcus   A single culture also grew Saccharomyces, most likely colonization   Patient had a new drain placed in left abdomen on 02/16   Culture this drainage is growing Enterococcus again, with no other pathogens   WBC decreased after stent revision but now increased again    Continue with IV Unasyn/fluconazole  Continue drainage  Treat x at least 14 days from last drainage, another 4 days  May need to continue if there are new abscesses  Will need to monitor WBC closely      2   Gastric perforation, status post repair, with persistent leak   She is now status post placement of esophageal stent   Barium swallow from today showed persistent leak   Patient is status post esophageal stent exchange with persistent leak   She is now status post a new stent inside initial stent   WBC decreased afterwards but now increased again  Upper GI with persistent leak  This is most likely etiology of increasing WBC  Monitor for recurrent leak      3   Persistent leukocytosis   WBC has improved but still remains i elevated   This is most likely related to persistent gastric leaking   Will continue antibiotic   Will continue to monitor WBC    WBC decreasing after stent revision but now increased again  If WBC continues to increase, we need to reimage abdomen to look for new undrained abscesses  Antibiotic plan as in above    Monitor WBC and temperature closely        4   Possible infected  shunt   Given presence of tip of  shunt in peritoneal space, there is high probability of  shunt infection   Fortunately, patient has no symptoms concerning for meningitis   She has neurological deficits   She is status post tapping of  shunt at [facility]   CSF culture there had no growth but patient had prior antibiotic  Gavin Martines will go ahead and treat her for possible/presumptive  shunt infection   Patient is now status post  shunt resection   CSF culture here also negative  Selina Wislon completed 2 weeks of antibiotic after shunt removal     No further antibiotic needed for this      5   Bilateral pleural effusion, status post chest tube placement   This is most likely sympathetic effusion, secondary to intra-abdominal abscesses   Chest tubes have been removed   Patient remains comfortable      6   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   No clinical signs of cellulitis   Patient has no headache    Wound is now sutured   Drainage/leakage appears to be resolving  Monitor      Discussed with the patient in detail regarding above plan      Antibiotics:  Unasyn  Fluconazole # 28  POD # 32  Post/drainage # 10     Subjective:  Patient is comfortable   She is tolerating tube feed  Abdominal pain controlled     Patient has mild and stable chest pain from esophageal stent  Temperature stays down   No chills  She is tolerating antibiotics well   No nausea, vomiting or diarrhea  Objective:  Vitals:  HR:  [] 102  Resp:  [18-20] 20  BP: (139-163)/(84-90) 163/90  SpO2:  [96 %] 96 %  Temp (24hrs), Av 8 °F (37 1 °C), Min:98 °F (36 7 °C), Max:99 3 °F (37 4 °C)  Current: Temperature: 99 2 °F (37 3 °C)    Physical Exam:     General: Awake, alert, cooperative, no distress  Lungs: Decreased breath sounds, no rales, no wheezing, respirations unlabored  Heart[de-identified]  Tachycardic with regular rhythm, S1 and S2 normal, no murmur  Abdomen: Soft, nondistended, mild diffuse tenderness, bowel sounds active all four quadrants,        no masses, no organomegaly    Incision healing well  Drain with mild seropurulence  Extremities: Trace edema  No erythema/warmth  No ulcer  Nontender to palpation  Skin:  No rash  Invasive Devices     Peripherally Inserted Central Catheter Line            PICC Line 18/91/75 Right Basilic 37 days          Drain            Closed/Suction Drain Midline;Superior Abdomen Other (Comment) 10 Fr  10 days    Closed/Suction Drain Left;Lateral LUQ Bulb 12 Fr  3 days    NG/OG/Enteral Tube Nasogastric 8 Fr Left nares 3 days                Labs studies:   I have personally reviewed pertinent labs  Results from last 7 days  Lab Units 02/26/18  0503 02/25/18  0503 02/23/18  0454  02/21/18  0505   SODIUM mmol/L 142 142 141  < > 144   POTASSIUM mmol/L 3 9 3 3* 3 8  < > 3 2*   CHLORIDE mmol/L 103 101 101  < > 103   CO2 mmol/L 36* 35* 35*  < > 35*   ANION GAP mmol/L 3* 6 5  < > 6   BUN mg/dL 14 18 12  < > 14   CREATININE mg/dL 0 42* 0 49* 0 34*  < > 0 36*   EGFR ml/min/1 73sq m 126 120 135  < > 132   GLUCOSE RANDOM mg/dL 178* 211* 119  < > 175*   CALCIUM mg/dL 10 0 10 4* 10 4*  < > 9 3   AST U/L  --   --   --   --  23   ALT U/L  --   --   --   --  31   ALK PHOS U/L  --   --   --   --  83   TOTAL PROTEIN g/dL  --   --   --   --  5 8*   BILIRUBIN TOTAL mg/dL  --   --   --   --  0 22   < > = values in this interval not displayed  Results from last 7 days  Lab Units 02/26/18  0503 02/25/18  0503 02/24/18  0918   WBC Thousand/uL 19 44* 15 80* 14 43*   HEMOGLOBIN g/dL 9 5* 9 1* 9 4*   PLATELETS Thousands/uL 504* 526* 500*           Imaging Studies:   I have personally reviewed pertinent imaging study reports and images in PACS  Upper GI reviewed  Persistent leak  EKG, Pathology, and Other Studies:   I have personally reviewed pertinent reports

## 2018-02-27 NOTE — PROGRESS NOTES
Progress Note - Acute Care Surgery   Hayden Robison 36 y o  female MRN: [id number]  Unit/Bed#: [facility] 834-01 Encounter: [id number]    Assessment:  42F with gastric perforation after hiatal hernia repair, s/p ex-lap and washout, repair of gastric perforation, EGD with stenting, IR drainage, EGD and repeat stenting x2  Plan:  - NPO  - continue tube feeds at goal  - will discuss ultimate plan with thoracic  - JPs to suction, monitor output  - trend wbc  - abx to continue per ID  - OOB/ambulate  - SQH/SCDs      Subjective/Objective     Subjective: Some nausea but no vomiting, tolerating tube feeds at goal  Pain stable    Objective:    Blood pressure 163/90, pulse 102, temperature 99 2 °F (37 3 °C), temperature source Oral, resp  rate 20, height 5' 4" (1 626 m), weight 65 7 kg (144 lb 13 5 oz), SpO2 96 %, not currently breastfeeding  ,Body mass index is 24 86 kg/m²  Intake/Output Summary (Last 24 hours) at 02/27/18 0731  Last data filed at 02/27/18 0630   Gross per 24 hour   Intake             1355 ml   Output             2840 ml   Net            -1485 ml       Invasive Devices     Peripherally Inserted Central Catheter Line            PICC Line 71/61/06 Right Basilic 37 days          Drain            Closed/Suction Drain Midline;Superior Abdomen Other (Comment) 10 Fr   10 days    Closed/Suction Drain Left;Lateral LUQ Bulb 12 Fr  3 days    NG/OG/Enteral Tube Nasogastric 8 Fr Left nares 3 days                Physical Exam:   NAD  Norm resp effort  Mildly tachycardic to low 100's  Chest incision cdi  Abd soft, slightly distended, NT, still with some subq air, drains in place with no output in bulb        Results from last 7 days  Lab Units 02/26/18  0503 02/25/18  0503 02/24/18  0918   WBC Thousand/uL 19 44* 15 80* 14 43*   HEMOGLOBIN g/dL 9 5* 9 1* 9 4*   HEMATOCRIT % 31 9* 29 6* 29 9*   PLATELETS Thousands/uL 504* 526* 500*       Results from last 7 days  Lab Units 02/26/18  0503 02/25/18  0503 02/23/18  0454   SODIUM mmol/L 142 142 141   POTASSIUM mmol/L 3 9 3 3* 3 8   CHLORIDE mmol/L 103 101 101   CO2 mmol/L 36* 35* 35*   BUN mg/dL 14 18 12   CREATININE mg/dL 0 42* 0 49* 0 34*   GLUCOSE RANDOM mg/dL 178* 211* 119   CALCIUM mg/dL 10 0 10 4* 10 4*

## 2018-02-27 NOTE — PROGRESS NOTES
Progress Note - Infectious Disease   Franklyn Salinas 36 y o  female MRN: [id number]  Unit/Bed#: [facility] 834-01 Encounter: [id number]      Impression/Recommendations:  1   Intra-abdominal/pelvic abscesses   Patient is status post multiple abdominal washouts   She has multiple drains in place   She is status post placement of drainage in a new perisplenic collection   She is clinically well and systemically stable  Operative cultures consistently growing  only ampicillin susceptible Enterococcus   A single culture also grew Saccharomyces, most likely colonization   Patient had a new drain placed in left abdomen on 02/16   Culture this drainage is growing Enterococcus again, with no other pathogens   WBC decreased after stent revision but not increased again    Continue with IV Unasyn/fluconazole  Continue drainage  Treat x at least 14 days from last drainage, another 5 days  Will need to monitor WBC closely      2   Gastric perforation, status post repair, with persistent leak   She is now status post placement of esophageal stent   Barium swallow from today showed persistent leak   Patient is status post esophageal stent exchange with persistent leak   She is now status post a new stent inside initial stent   WBC decreased afterwards but now increased again  Monitor for recurrent leak      3   Persistent leukocytosis   WBC has improved but still remains i elevated   This is most likely related to persistent gastric leaking   Will continue antibiotic   Will continue to monitor WBC    WBC decreasing after stent revision but now increased again  If WBC continues to increase, we need to reimage abdomen  Antibiotic plan as in above    Monitor WBC and temperature closely        4   Possible infected  shunt   Given presence of tip of  shunt in peritoneal space, there is high probability of  shunt infection   Fortunately, patient has no symptoms concerning for meningitis   She has neurological deficits  Bubba Bhandari is status post tapping of  shunt at [facility]   CSF culture there had no growth but patient had prior antibiotic  Kurt Garber will go ahead and treat her for possible/presumptive  shunt infection   Patient is now status post  shunt resection   CSF culture here also negative  Miki Gleason completed 2 weeks of antibiotic after shunt removal     No further antibiotic needed for this      5   Bilateral pleural effusion, status post chest tube placement   This is most likely sympathetic effusion, secondary to intra-abdominal abscesses   Chest tubes have been removed   Patient remains comfortable      6   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   No clinical signs of cellulitis   Patient has no headache    Wound is now sutured   Drainage/leakage appears to be resolving  Monitor      Discussed with the patient in detail regarding above plan      Antibiotics:  Unasyn  Fluconazole # 27  POD # 31  Post/drainage # 9     Subjective:  Patient is comfortable  She is tolerating tube feed  Abdominal pain controlled     Patient has mild chest pain from esophageal stent  Temperature stays down   No chills  She is tolerating antibiotics well   No nausea, vomiting or diarrhea  Objective:  Vitals:  HR:  [] 96  Resp:  [18] 18  BP: (140-174)/(76-97) 140/84  SpO2:  [95 %-98 %] 96 %  Temp (24hrs), Av 1 °F (36 7 °C), Min:98 °F (36 7 °C), Max:98 3 °F (36 8 °C)  Current: Temperature: 98 °F (36 7 °C)    Physical Exam:     General: Awake, alert, cooperative, no distress  Lungs: Decreased breath sounds, no rales, no wheezing, respirations unlabored  Heart[de-identified]  Regular rate and rhythm, S1 and S2 normal, no murmur  Abdomen: Soft, nondistended, non-tender, bowel sounds active all four quadrants,        no masses, no organomegaly  Incision clean  Drains clearer  Extremities: Trace edema  No erythema/warmth  No ulcer  Nontender to palpation  Skin:  No rash       Invasive Devices     Peripherally Inserted Central Catheter Line PICC Line 66/18/76 Right Basilic 36 days          Drain            Closed/Suction Drain Midline;Superior Abdomen Other (Comment) 10 Fr  9 days    Closed/Suction Drain Left;Lateral LUQ Bulb 12 Fr  3 days    NG/OG/Enteral Tube Nasogastric 8 Fr Left nares 3 days                Labs studies:   I have personally reviewed pertinent labs  Results from last 7 days  Lab Units 02/26/18  0503 02/25/18  0503 02/23/18  0454  02/21/18  0505   SODIUM mmol/L 142 142 141  < > 144   POTASSIUM mmol/L 3 9 3 3* 3 8  < > 3 2*   CHLORIDE mmol/L 103 101 101  < > 103   CO2 mmol/L 36* 35* 35*  < > 35*   ANION GAP mmol/L 3* 6 5  < > 6   BUN mg/dL 14 18 12  < > 14   CREATININE mg/dL 0 42* 0 49* 0 34*  < > 0 36*   EGFR ml/min/1 73sq m 126 120 135  < > 132   GLUCOSE RANDOM mg/dL 178* 211* 119  < > 175*   CALCIUM mg/dL 10 0 10 4* 10 4*  < > 9 3   AST U/L  --   --   --   --  23   ALT U/L  --   --   --   --  31   ALK PHOS U/L  --   --   --   --  83   TOTAL PROTEIN g/dL  --   --   --   --  5 8*   BILIRUBIN TOTAL mg/dL  --   --   --   --  0 22   < > = values in this interval not displayed  Results from last 7 days  Lab Units 02/26/18  0503 02/25/18  0503 02/24/18  0918   WBC Thousand/uL 19 44* 15 80* 14 43*   HEMOGLOBIN g/dL 9 5* 9 1* 9 4*   PLATELETS Thousands/uL 504* 526* 500*           Imaging Studies:   I have personally reviewed pertinent imaging study reports and images in PACS  EKG, Pathology, and Other Studies:   I have personally reviewed pertinent reports

## 2018-02-27 NOTE — PLAN OF CARE
Problem: PHYSICAL THERAPY ADULT  Goal: Performs mobility at highest level of function for planned discharge setting  See evaluation for individualized goals  Treatment/Interventions: Functional transfer training, LE strengthening/ROM, Therapeutic exercise, Endurance training, Bed mobility, Spoke to nursing  Equipment Recommended:  (R/O LEAST RESTRICTIVE AD )       See flowsheet documentation for full assessment, interventions and recommendations  Outcome: Progressing  Prognosis: Fair  Problem List: Decreased strength, Decreased range of motion, Decreased endurance, Impaired balance, Decreased mobility, Decreased coordination, Impaired judgement, Decreased safety awareness, Obesity, Impaired tone, Pain, Decreased skin integrity  Assessment: Pt tx session for gait training and therex- pt continues to require max cues adn encouragemetn fr participation and motivation- anxiety; pain and ?fear limiting- ambualtion w/ RW 39' x2 w/ chair follow- therex as documented w/o complaints- pt encouraged to complete seated HEP at min 3x throughout the day for LE strengthening- PT continuing to recommend inpt rehab on d/c as pt remins profoundly weak and deconditioned- all needs in reach post session  Barriers to Discharge: Inaccessible home environment, Decreased caregiver support     Recommendation: Post acute IP rehab     PT - OK to Discharge:  (to rehab)    See flowsheet documentation for full assessment

## 2018-02-27 NOTE — OCCUPATIONAL THERAPY NOTE
OT TREATMENT NOTE       02/27/18 1215   Restrictions/Precautions   Other Precautions Fall Risk;Multiple lines;Telemetry   Pain Assessment   Pain Assessment 0-10   Pain Score 8   ADL   Where Assessed Supine, bed   Grooming Assistance 3  Moderate Assistance   Grooming Deficit Increased time to complete;Wash/dry face; Teeth care;Brushing hair   Grooming Comments Pt wtih feeding tube and increased anxiety with ripping out tubing  Pt required assist with washing face    UB Bathing Assistance 2  Maximal Assistance   UB Bathing Deficit Setup; Increased time to complete;Right arm; Abdomen; Chest   UB Bathing Comments Due to abdominal incisions and pt's anxiety and fear with looking at incisions, pt required assist to wash abdomen and chest  Pt overall nervous regarding medical complexity and requires cueing for coping  LB Bathing Assistance 3  Moderate Assistance   LB Bathing Deficit Buttocks; Perineal area   LB Bathing Comments Pt completed bathing supine in bed  Pt able to cross leg over opposing knee to wash b/l LE's below knees  Pt able to wash buzz while supine in bed but requires assist for thoroughness  Pt required assist for buttock hygiene    UB Dressing Assistance 3  Moderate Assistance   UB Dressing Comments Pt able to thread b/l UE's into shirt but requires assist to pull hospital gown over head and to tie hospital gown in back   [facility] 3  Moderate Assistance   LB Dressing Deficit Increased time to complete; Don/doff R sock; Don/doff L sock   LB Dressing Comments Pt able to cross leg over opposing knee to don socks  Pt is limited by abdominal pain and does require assist for task  Functional Standing Tolerance   Time 2 min x 2 trials   Activity Pt able to stand to step on/off scale for weight  Pt requires mod A x2 for balance suport      Bed Mobility   Rolling R 4  Minimal assistance   Additional items Assist x 1   Rolling L 4  Minimal assistance   Additional items Assist x 1   Supine to Sit 4 Minimal assistance   Additional items Assist x 2; Increased time required;HOB elevated   Transfers   Sit to Stand 3  Moderate assistance   Additional items Assist x 1;Verbal cues   Stand to Sit 4  Minimal assistance   Additional items Assist x 1; Increased time required;Verbal cues   Stand pivot 3  Moderate assistance   Functional Mobility   Functional Mobility (mod A x2 )   Additional Comments Pt engaged inf xnl mobility for short distance mobility with mod A x 1 for balance support and 2nd person for safety/line management and third person for w/c follow  Cognition   Overall Cognitive Status WFL   Arousal/Participation Alert; Cooperative   Attention Within functional limits   Orientation Level Oriented X4   Memory Within functional limits   Following Commands Follows all commands and directions without difficulty   Activity Tolerance   Activity Tolerance Patient limited by fatigue;Patient limited by pain   Assessment   Assessment Pt engaged in OT session with focus on self care  Pt requires extended time to complete sponge bathing due to fatigue and pain  Pt reports pain severe in abdomen with any movement  Pt completed supine bathing due to pain  Pt continues with limited dynamic reach below waist due to pain  Pt would benefit from use of LHAE for LB bathing and dressing  Pt requires several rest breaks during activity to initiate deep breathing for pain management  Pt able to transfer using log roll technique to EOB using breathing to manage pain  Pt required min A x2 for sit to stand due to pain  Pt with w/c follow during fxnl mobility due to sudden fatigue  Pt overall tolerated session fair  Pt will need inpatient rehab to maxmize independence and return to PLOF  Plan   Treatment Interventions ADL retraining;Functional transfer training;UE strengthening/ROM; Endurance training;Patient/family training;Equipment evaluation/education; Compensatory technique education; Energy conservation; Activityengagement Goal Expiration Date 03/04/18   Treatment Day 5   OT Frequency 3-5x/wk   Recommendation   OT Discharge Recommendation Short Term Rehab   Barthel Index   Feeding 0   Bathing 0   Grooming Score 0   Dressing Score 5   Bladder Score 10   Bowels Score 10   Toilet Use Score 5   Transfers (Bed/Chair) Score 5   Mobility (Level Surface) Score 0   Stairs Score 0   Barthel Index Score 35

## 2018-02-27 NOTE — PROGRESS NOTES
Progress Note - Thoracic Surgery   Humble Saleem 36 y o  female MRN: [id number]  [facility] Encounter: [id number]    Assessment:  36y o -year-old female with gastric perforation s/p hiatal hernia repair at outside hospital, s/p esophageal stent placement, exploratory laparotomy and washout  Has had persistent esophageal/gastric leak on imaging  Had bilateral pleural effusions which were drained  Perisplenic fluid collection was drained  Most recently with EGD & stent placement 2/23  -s/p swallow 2/26 with persistent leak     Plan:  Continue TF at goal w/ NPO  Elevate head of bed/aspiration precautions  Antibiotics per ID- Unasyn / Diflucan  Monitor WBC  OOB, pulm toilet  PRN pain control  Continue ADENIKE x 2 to bulb suction  PT-inpatient rehab  Primary care per acute care surgery      Subjective/Objective   Chief Complaint: None    Subjective: has some midline chest and abdominal pain  Nausea, no vomiting  +flatus, no BM yesterday, wants to try a suppository today  Objective:     Blood pressure 139/87, pulse 103, temperature 99 3 °F (37 4 °C), temperature source Oral, resp  rate 18, height 5' 4" (1 626 m), weight 65 8 kg (145 lb 1 oz), SpO2 96 %, not currently breastfeeding  ,Body mass index is 24 9 kg/m²  Intake/Output Summary (Last 24 hours) at 02/27/18 0539  Last data filed at 02/27/18 0507   Gross per 24 hour   Intake              235 ml   Output             2580 ml   Net            -2345 ml       Invasive Devices     Peripherally Inserted Central Catheter Line            PICC Line 58/97/94 Right Basilic 37 days          Drain            Closed/Suction Drain Midline;Superior Abdomen Other (Comment) 10 Fr   10 days    Closed/Suction Drain Left;Lateral LUQ Bulb 12 Fr  3 days    NG/OG/Enteral Tube Nasogastric 8 Fr Left nares 3 days                Physical Exam:   AAOX3  NAD  normal respiratory effort  soft, TTP midline, ND  Midline ADENIKE with 10cc purulent light green fluid  L flank ADENIKE with minimal seropurulent fluid    Lab, Imaging and other studies:CBC: No results found for: WBC, HGB, HCT, MCV, PLT, ADJUSTEDWBC, MCH, MCHC, RDW, MPV, NRBC, CMP: No results found for: NA, K, CL, CO2, ANIONGAP, BUN, CREATININE, GLUCOSE, CALCIUM, AST, ALT, ALKPHOS, PROT, ALBUMIN, BILITOT, EGFR  VTE Pharmacologic Prophylaxis: Heparin  VTE Mechanical Prophylaxis: sequential compression device

## 2018-02-27 NOTE — PROGRESS NOTES
Patient care rounds were completed with the patient's nurse today, Lynda Castelan  We discussed the plan is to keep her NPO due to persistent esophageal leak  Continue goal tube feeds via her nasogastric feeding tube  Continue both IR drains and continue to monitor output  Continue IV antibiotics per Infectious Disease through 03/03/2018 at least     We reviewed all of the invasive devices/lines/telemetry orders   - Right-sided PICC line for continued long-term IV antibiotics  Pain Assessment / Plan:  - Continue current analgesic regimen  Mobility Assessment / Plan:  - Continue PT and      Goals / Barriers for discharge:  - Patient needs continued nasogastric tube feeding while NPO due to persistent esophageal leak postoperatively  - Patient has a need for continued IV antibiotics  - Patient will need rehab due to physical deconditioning  Case management following  All questions and concerns were addressed  I spent greater than 15 minutes reviewing the plan with the patient and the nurse, and coordinating her care for the day      Chris Beyer PA-C  2/27/2018 12:16 PM

## 2018-02-28 ENCOUNTER — DOCUMENTATION (OUTPATIENT)
Dept: NEUROSURGERY | Facility: CLINIC | Age: 43
End: 2018-02-28

## 2018-02-28 LAB
ANION GAP SERPL CALCULATED.3IONS-SCNC: 4 MMOL/L (ref 4–13)
BASOPHILS # BLD MANUAL: 0 THOUSAND/UL (ref 0–0.1)
BASOPHILS NFR MAR MANUAL: 0 % (ref 0–1)
BUN SERPL-MCNC: 15 MG/DL (ref 5–25)
CALCIUM SERPL-MCNC: 10 MG/DL (ref 8.3–10.1)
CHLORIDE SERPL-SCNC: 102 MMOL/L (ref 100–108)
CO2 SERPL-SCNC: 37 MMOL/L (ref 21–32)
CREAT SERPL-MCNC: 0.46 MG/DL (ref 0.6–1.3)
EOSINOPHIL # BLD MANUAL: 0 THOUSAND/UL (ref 0–0.4)
EOSINOPHIL NFR BLD MANUAL: 0 % (ref 0–6)
ERYTHROCYTE [DISTWIDTH] IN BLOOD BY AUTOMATED COUNT: 16 % (ref 11.6–15.1)
GFR SERPL CREATININE-BSD FRML MDRD: 122 ML/MIN/1.73SQ M
GLUCOSE SERPL-MCNC: 180 MG/DL (ref 65–140)
GLUCOSE SERPL-MCNC: 182 MG/DL (ref 65–140)
GLUCOSE SERPL-MCNC: 184 MG/DL (ref 65–140)
GLUCOSE SERPL-MCNC: 191 MG/DL (ref 65–140)
GLUCOSE SERPL-MCNC: 202 MG/DL (ref 65–140)
GLUCOSE SERPL-MCNC: 239 MG/DL (ref 65–140)
HCT VFR BLD AUTO: 30.6 % (ref 34.8–46.1)
HGB BLD-MCNC: 9.3 G/DL (ref 11.5–15.4)
LYMPHOCYTES # BLD AUTO: 0.72 THOUSAND/UL (ref 0.6–4.47)
LYMPHOCYTES # BLD AUTO: 3 % (ref 14–44)
MCH RBC QN AUTO: 28.1 PG (ref 26.8–34.3)
MCHC RBC AUTO-ENTMCNC: 30.4 G/DL (ref 31.4–37.4)
MCV RBC AUTO: 92 FL (ref 82–98)
MONOCYTES # BLD AUTO: 0.24 THOUSAND/UL (ref 0–1.22)
MONOCYTES NFR BLD: 1 % (ref 4–12)
NEUTROPHILS # BLD MANUAL: 22.64 THOUSAND/UL (ref 1.85–7.62)
NEUTS SEG NFR BLD AUTO: 94 % (ref 43–75)
NRBC BLD AUTO-RTO: 0 /100 WBCS
PLATELET # BLD AUTO: 456 THOUSANDS/UL (ref 149–390)
PLATELET BLD QL SMEAR: ADEQUATE
PMV BLD AUTO: 9.7 FL (ref 8.9–12.7)
POTASSIUM SERPL-SCNC: 3.4 MMOL/L (ref 3.5–5.3)
RBC # BLD AUTO: 3.31 MILLION/UL (ref 3.81–5.12)
SODIUM SERPL-SCNC: 143 MMOL/L (ref 136–145)
VARIANT LYMPHS # BLD AUTO: 2 %
WBC # BLD AUTO: 24.08 THOUSAND/UL (ref 4.31–10.16)

## 2018-02-28 PROCEDURE — 80048 BASIC METABOLIC PNL TOTAL CA: CPT | Performed by: PHYSICIAN ASSISTANT

## 2018-02-28 PROCEDURE — 97530 THERAPEUTIC ACTIVITIES: CPT

## 2018-02-28 PROCEDURE — 97535 SELF CARE MNGMENT TRAINING: CPT

## 2018-02-28 PROCEDURE — 99024 POSTOP FOLLOW-UP VISIT: CPT | Performed by: SURGERY

## 2018-02-28 PROCEDURE — 85027 COMPLETE CBC AUTOMATED: CPT | Performed by: PHYSICIAN ASSISTANT

## 2018-02-28 PROCEDURE — 97116 GAIT TRAINING THERAPY: CPT

## 2018-02-28 PROCEDURE — 85007 BL SMEAR W/DIFF WBC COUNT: CPT | Performed by: PHYSICIAN ASSISTANT

## 2018-02-28 PROCEDURE — 82948 REAGENT STRIP/BLOOD GLUCOSE: CPT

## 2018-02-28 PROCEDURE — C9113 INJ PANTOPRAZOLE SODIUM, VIA: HCPCS | Performed by: NURSE PRACTITIONER

## 2018-02-28 PROCEDURE — 97110 THERAPEUTIC EXERCISES: CPT

## 2018-02-28 PROCEDURE — 99233 SBSQ HOSP IP/OBS HIGH 50: CPT | Performed by: INTERNAL MEDICINE

## 2018-02-28 RX ORDER — POTASSIUM CHLORIDE 20MEQ/15ML
60 LIQUID (ML) ORAL ONCE
Status: COMPLETED | OUTPATIENT
Start: 2018-02-28 | End: 2018-02-28

## 2018-02-28 RX ADMIN — HYDROMORPHONE HYDROCHLORIDE 0.5 MG: 1 INJECTION, SOLUTION INTRAMUSCULAR; INTRAVENOUS; SUBCUTANEOUS at 12:41

## 2018-02-28 RX ADMIN — POTASSIUM CHLORIDE 60 MEQ: 20 SOLUTION ORAL at 08:36

## 2018-02-28 RX ADMIN — ONDANSETRON 4 MG: 2 INJECTION INTRAMUSCULAR; INTRAVENOUS at 20:11

## 2018-02-28 RX ADMIN — METOPROLOL TARTRATE 10 MG: 1 INJECTION, SOLUTION INTRAVENOUS at 11:07

## 2018-02-28 RX ADMIN — Medication 3 G: at 23:19

## 2018-02-28 RX ADMIN — METOPROLOL TARTRATE 10 MG: 1 INJECTION, SOLUTION INTRAVENOUS at 05:25

## 2018-02-28 RX ADMIN — METOPROLOL TARTRATE 10 MG: 1 INJECTION, SOLUTION INTRAVENOUS at 23:32

## 2018-02-28 RX ADMIN — HYDROMORPHONE HYDROCHLORIDE 0.5 MG: 1 INJECTION, SOLUTION INTRAMUSCULAR; INTRAVENOUS; SUBCUTANEOUS at 20:54

## 2018-02-28 RX ADMIN — HYDROMORPHONE HYDROCHLORIDE 1 MG: 1 INJECTION, SOLUTION INTRAMUSCULAR; INTRAVENOUS; SUBCUTANEOUS at 08:36

## 2018-02-28 RX ADMIN — ONDANSETRON 4 MG: 2 INJECTION INTRAMUSCULAR; INTRAVENOUS at 08:54

## 2018-02-28 RX ADMIN — HYDROMORPHONE HYDROCHLORIDE 1 MG: 1 INJECTION, SOLUTION INTRAMUSCULAR; INTRAVENOUS; SUBCUTANEOUS at 20:13

## 2018-02-28 RX ADMIN — Medication 3 G: at 05:16

## 2018-02-28 RX ADMIN — METOPROLOL TARTRATE 10 MG: 1 INJECTION, SOLUTION INTRAVENOUS at 17:29

## 2018-02-28 RX ADMIN — Medication 3 G: at 11:07

## 2018-02-28 RX ADMIN — HEPARIN SODIUM 5000 UNITS: 5000 INJECTION, SOLUTION INTRAVENOUS; SUBCUTANEOUS at 08:36

## 2018-02-28 RX ADMIN — HYDROMORPHONE HYDROCHLORIDE 1 MG: 1 INJECTION, SOLUTION INTRAMUSCULAR; INTRAVENOUS; SUBCUTANEOUS at 23:19

## 2018-02-28 RX ADMIN — PANTOPRAZOLE SODIUM 40 MG: 40 INJECTION, POWDER, FOR SOLUTION INTRAVENOUS at 08:36

## 2018-02-28 RX ADMIN — FLUCONAZOLE 400 MG: 2 INJECTION INTRAVENOUS at 15:27

## 2018-02-28 RX ADMIN — HYDROMORPHONE HYDROCHLORIDE 1 MG: 1 INJECTION, SOLUTION INTRAMUSCULAR; INTRAVENOUS; SUBCUTANEOUS at 00:12

## 2018-02-28 RX ADMIN — INSULIN LISPRO 1 UNITS: 100 INJECTION, SOLUTION INTRAVENOUS; SUBCUTANEOUS at 20:34

## 2018-02-28 RX ADMIN — INSULIN LISPRO 1 UNITS: 100 INJECTION, SOLUTION INTRAVENOUS; SUBCUTANEOUS at 06:46

## 2018-02-28 RX ADMIN — HYDROMORPHONE HYDROCHLORIDE 1 MG: 1 INJECTION, SOLUTION INTRAMUSCULAR; INTRAVENOUS; SUBCUTANEOUS at 05:25

## 2018-02-28 RX ADMIN — HEPARIN SODIUM 5000 UNITS: 5000 INJECTION, SOLUTION INTRAVENOUS; SUBCUTANEOUS at 20:55

## 2018-02-28 RX ADMIN — HYDROMORPHONE HYDROCHLORIDE 1 MG: 1 INJECTION, SOLUTION INTRAMUSCULAR; INTRAVENOUS; SUBCUTANEOUS at 14:00

## 2018-02-28 RX ADMIN — METOPROLOL TARTRATE 10 MG: 1 INJECTION, SOLUTION INTRAVENOUS at 00:01

## 2018-02-28 RX ADMIN — INSULIN LISPRO 2 UNITS: 100 INJECTION, SOLUTION INTRAVENOUS; SUBCUTANEOUS at 11:08

## 2018-02-28 RX ADMIN — HYDROMORPHONE HYDROCHLORIDE 1 MG: 1 INJECTION, SOLUTION INTRAMUSCULAR; INTRAVENOUS; SUBCUTANEOUS at 17:29

## 2018-02-28 RX ADMIN — ONDANSETRON 4 MG: 2 INJECTION INTRAMUSCULAR; INTRAVENOUS at 15:27

## 2018-02-28 RX ADMIN — Medication 3 G: at 17:29

## 2018-02-28 RX ADMIN — HYDROMORPHONE HYDROCHLORIDE 1 MG: 1 INJECTION, SOLUTION INTRAMUSCULAR; INTRAVENOUS; SUBCUTANEOUS at 11:07

## 2018-02-28 NOTE — OCCUPATIONAL THERAPY NOTE
Occupational Therapy Treatment Note:     02/28/18 1300   Restrictions/Precautions   Weight Bearing Precautions Per Order No   Other Precautions Fall Risk;Multiple lines;Pain   Pain Assessment   Pain Score 9   Pain Location Abdomen   Pain Orientation Mid   Hospital Pain Intervention(s) Repositioned   Response to Interventions unchanged   ADL   Where Assessed Supine, bed   LB Dressing Assistance 5  Supervision/Setup   LB Dressing Deficit Don/doff R sock; Don/doff L sock   LB Dressing Comments Pt is able to complete doff/don socks while in long sit in bed however when seated EOB pt with increased difficulity due to abdominal pains   Functional Standing Tolerance   Time ~10 mins   Activity static standing   Comments rw level   Bed Mobility   Rolling R 4  Minimal assistance   Additional items Assist x 1   Rolling L 4  Minimal assistance   Additional items Assist x 1   Supine to Sit 4  Minimal assistance   Additional items Assist x 1   Transfers   Sit to Stand 4  Minimal assistance   Additional items Assist x 1   Stand to Sit 4  Minimal assistance   Additional items Assist x 1   Stand pivot 4  Minimal assistance   Additional items Assist x 1   Functional Mobility   Functional Mobility 4  Minimal assistance   Additional Comments Assist x 2 for line managment   Additional items Rolling walker   Cognition   Overall Cognitive Status [facility]   Arousal/Participation Alert   Attention Attends with cues to redirect   Orientation Level Oriented X4   Memory Within functional limits   Following Commands Follows one step commands without difficulty   Activity Tolerance   Activity Tolerance Patient tolerated treatment well   Medical Staff Made Aware NSG aware   Assessment   Assessment Pt was seen this date for OT tx session focusning on overall activity tolerance  Pt is able to doff/don socks seated in long sit in bed with back supported by [facility] elevated  Difficulity with same task seated EOB unsupported due to reports of pain in abdomen  Pt comeltes bed mobility tasks min A with increased time  Pt requires increased emotional support and encouragment throughout tx session as pt is anxious and fearful  With encouragment pt compeltes all funcitonal STS, trasnfer and functional mobility tasks with min A and assist of another for lines  Folliowing funcitonal mobility tasks pt reports increased pain and fatigues and not wanting to compelte any grooming or self care tasks  Requesting rest  Pt would benefit from continued OT tx to improve overall functional abilities  Continue follow pt with current poc     Plan   Treatment Interventions ADL retraining   Goal Expiration Date 03/04/18   Treatment Day 6   OT Frequency 3-5x/wk   Recommendation   OT Discharge Recommendation Short Term [address], [address]

## 2018-02-28 NOTE — PROGRESS NOTES
Progress Note - Thoracic Surgery   Real Gallo 36 y o  female MRN: [id number]  Unit/Bed#: [facility] 834-01 Encounter: [id number]    Assessment:  36y o -year-old female with gastric perforation s/p hiatal hernia repair at outside hospital, s/p esophageal stent placement, exploratory laparotomy and washout  Has had persistent esophageal/gastric leak on imaging  Had bilateral pleural effusions which were drained  Perisplenic fluid collection was drained  Most recently with EGD & stent placement 2/23    Plan:  Continue bowel rest, conservative management- hopefully leak will seal over time  Do not think an additional stent / repositioning would be helpful at this point  Continue TF at goal, monitor nausea  Pain control per primary- likely that patient is developing tolerance given her long hospital stay  OOB to chair  Continue drains  Antibiotics per ID- Unasyn, fluconazole for at least another 3 days (14 days from last drainage)      Subjective/Objective   Chief Complaint: None    Subjective: States that pain is less relieved with the same medication doses  Intermittent nausea but otherwise tolerating TF  Objective:     Blood pressure 163/77, pulse (!) 113, temperature 98 8 °F (37 1 °C), temperature source Oral, resp  rate 20, height 5' 4" (1 626 m), weight 60 8 kg (134 lb 0 6 oz), SpO2 98 %, not currently breastfeeding  ,Body mass index is 23 01 kg/m²        Intake/Output Summary (Last 24 hours) at 02/28/18 0558  Last data filed at 02/28/18 0505   Gross per 24 hour   Intake             1625 ml   Output             2175 ml   Net             -550 ml       Invasive Devices     Peripherally Inserted Central Catheter Line            PICC Line 98/96/61 Right Basilic 38 days          Drain            Closed/Suction Drain Midline;Superior Abdomen Other (Comment) 10 Fr  11 days    Closed/Suction Drain Left;Lateral LUQ Bulb 12 Fr  4 days    NG/OG/Enteral Tube Nasogastric 8 Fr Left nares 4 days                Physical Exam: Gen: A&O, NAD  Cardio: RRR  Lungs: CTAB  Abd: Soft, seems slightly distended, non tender  Staples c/d/i  ADENIKE to epigastrum, bulb empty at present   Left sided drain w/ serous fluid, also scant/empty      Lab, Imaging and other studies:CBC: No results found for: WBC, HGB, HCT, MCV, PLT, ADJUSTEDWBC, MCH, MCHC, RDW, MPV, NRBC, CMP: No results found for: NA, K, CL, CO2, ANIONGAP, BUN, CREATININE, GLUCOSE, CALCIUM, AST, ALT, ALKPHOS, PROT, ALBUMIN, BILITOT, EGFR, Coagulation: No results found for: PT, INR, APTT  VTE Pharmacologic Prophylaxis: Heparin  VTE Mechanical Prophylaxis: sequential compression device

## 2018-02-28 NOTE — PROGRESS NOTES
Progress Note - Infectious Disease   Miladys Devlin 36 y o  female MRN: [id number]  Unit/Bed#: [facility] [place] 834-01 Encounter: [id number]      Impression/Recommendations:  1   Intra-abdominal/pelvic abscesses   Patient is status post multiple abdominal washouts   She has multiple drains in place   She is status post placement of drainage in a new perisplenic collection   She is clinically well and systemically stable  Operative cultures consistently growing  only ampicillin susceptible Enterococcus   A single culture also grew Saccharomyces, most likely colonization   Patient had a new drain placed in left abdomen on 02/16   Culture this drainage is growing Enterococcus again, with no other pathogens   WBC decreased after stent revision but now increased again over the last few days    Continue with IV Unasyn/fluconazole  Continue drainage  Treat x at least 14 days from last drainage, another 3 days  May need to continue if there are new abscesses  Will need to monitor WBC closely  Consider repeat imaging of the abdomen/pelvis      2   Gastric perforation, status post repair, with persistent leak   She is now status post placement of esophageal stent   Barium swallow from today showed persistent leak   Patient is status post esophageal stent exchange with persistent leak   She is now status post a new stent inside initial stent   WBC decreased afterwards but now increased again  Upper GI with persistent leak  This is most likely etiology of increasing WBC  Monitor for recurrent leak      3   Persistent leukocytosis   WBC has improved but still remains i elevated   This is most likely related to persistent gastric leaking   Will continue antibiotic   Will continue to monitor WBC    WBC decreasing after stent revision but now increased again   With WBC continued to increase, we should reimage abdomen to look for new undrained abscesses  Antibiotic plan as in above    Monitor WBC and temperature closely     Consider repeat abdomen/pelvis CT to look for new undrained abscesses      4   Possible infected  shunt   Given presence of tip of  shunt in peritoneal space, there is high probability of  shunt infection   Fortunately, patient has no symptoms concerning for meningitis   She has neurological deficits   She is status post tapping of  shunt at [facility]   CSF culture there had no growth but patient had prior antibiotic  Kin Soja will go ahead and treat her for possible/presumptive  shunt infection   Patient is now status post  shunt resection   CSF culture here also negative  Hannah Guerra completed 2 weeks of antibiotic after shunt removal     No further antibiotic needed for this      5   Bilateral pleural effusion, status post chest tube placement   This is most likely sympathetic effusion, secondary to intra-abdominal abscesses   Chest tubes have been removed   Patient remains comfortable      6   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   No clinical signs of cellulitis   Patient has no headache    Wound is now sutured   Drainage/leakage appears to be resolving  Monitor      Discussed with the patient in detail regarding above plan      Antibiotics:  Unasyn  Fluconazole # 29  POD # 33  Post/drainage # 11     Subjective:  Patient has stable chest wall pain from esophageal stent  Abdominal pain stable, controlled  She is tolerating tube feed  Temperature stays down   No chills  She is tolerating antibiotics well   No nausea, vomiting or diarrhea  Objective:  Vitals:  HR:  [100-114] 100  Resp:  [20] 20  BP: (140-163)/(77-86) 155/85  SpO2:  [94 %-98 %] 94 %  Temp (24hrs), Av °F (37 2 °C), Min:98 8 °F (37 1 °C), Max:99 3 °F (37 4 °C)  Current: Temperature: 99 °F (37 2 °C)    Physical Exam:     General: Awake, alert, cooperative, no distress  Lungs: Expansion symmetric, no rales, no wheezing, respirations unlabored  Heart[de-identified]  Tachycardic with regular rhythm, S1 and S2 normal, no murmur     Abdomen: Soft, mildly distended, mild diffuse, bowel sounds active all four quadrants,        no masses, no organomegaly  Drains serous to seropurulent  Extremities: Trace edema  No erythema/warmth  No ulcer  Nontender to palpation  Skin:  No rash  Invasive Devices     Peripherally Inserted Central Catheter Line            PICC Line 09/83/13 Right Basilic 38 days          Drain            Closed/Suction Drain Midline;Superior Abdomen Other (Comment) 10 Fr  11 days    Closed/Suction Drain Left;Lateral LUQ Bulb 12 Fr  4 days    NG/OG/Enteral Tube Nasogastric 8 Fr Left nares 4 days                Labs studies:   I have personally reviewed pertinent labs  Results from last 7 days  Lab Units 02/28/18  0515 02/26/18  0503 02/25/18  0503   SODIUM mmol/L 143 142 142   POTASSIUM mmol/L 3 4* 3 9 3 3*   CHLORIDE mmol/L 102 103 101   CO2 mmol/L 37* 36* 35*   ANION GAP mmol/L 4 3* 6   BUN mg/dL 15 14 18   CREATININE mg/dL 0 46* 0 42* 0 49*   EGFR ml/min/1 73sq m 122 126 120   GLUCOSE RANDOM mg/dL 202* 178* 211*   CALCIUM mg/dL 10 0 10 0 10 4*       Results from last 7 days  Lab Units 02/28/18  0515 02/26/18  0503 02/25/18  0503   WBC Thousand/uL 24 08* 19 44* 15 80*   HEMOGLOBIN g/dL 9 3* 9 5* 9 1*   PLATELETS Thousands/uL 456* 504* 526*           Imaging Studies:   I have personally reviewed pertinent imaging study reports and images in PACS  EKG, Pathology, and Other Studies:   I have personally reviewed pertinent reports

## 2018-02-28 NOTE — PHYSICAL THERAPY NOTE
Physical Therapy Progress Note         18 1239   Pain Assessment   Pain Assessment 0-10   Pain Score 9   Pain Type Chronic pain;Surgical pain   Pain Location Abdomen; Chest   Pain Orientation Mid   Restrictions/Precautions   Weight Bearing Precautions Per Order No   Other Precautions Fall Risk;Multiple lines;Pain   General   Chart Reviewed Yes   Family/Caregiver Present No   Cognition   Overall Cognitive Status WFL   Subjective   Subjective Identified patient with name,  and bracelet ID  Patient reported pain in the chest and abdomen  Patient in bed supine upon entry  Bed Mobility   Supine to Sit 4  Minimal assistance   Additional items Assist x 1;Bedrails; Increased time required;LE management;Verbal cues   Transfers   Sit to Stand 4  Minimal assistance   Additional items Assist x 1;Assist x 2; Increased time required;Verbal cues;Armrests   Stand to Sit 4  Minimal assistance   Additional items Assist x 1; Increased time required;Verbal cues;Armrests   Ambulation/Elevation   Gait pattern Excessively slow; Short stride; Inconsistent bree;Decreased foot clearance   Gait Assistance 4  Minimal assist   Additional items Assist x 1;Assist x 2;Verbal cues; Other (Comment)  (chair follow)   Assistive Device Rolling walker   Distance 85ft x 2   Endurance Deficit   Endurance Deficit Yes   Endurance Deficit Description Fatigue and deconditioned   Activity Tolerance   Activity Tolerance Patient limited by fatigue;Patient tolerated treatment well   Nurse Made Aware Yes   Exercises   TKR Supine;AROM;20 reps;Bilateral   Assessment   Prognosis Fair   Problem List Decreased strength;Decreased range of motion;Decreased endurance; Impaired balance;Decreased mobility;Pain;Decreased safety awareness; Impaired judgement   Assessment Patient seen for PT session  Patient agreeable to PT session  patient reported pain in the abdomen and chest area 9/10  patient performed supine LE TE's actively and SLR only with AAROM   Supine to sit with Min A x 1  log roll technique instructed to aovid too much discomfort in the abdominal area  Performed STS and back transfer with Min A x 1 and another SBA with the first attempt and no SBA for second attempt  Patient able to ambulate increased distance in today's session  needed seated rest between ambulation due to fatigue  Patient seated on recliner wih LEs elevated and all needs within reach  Patient continues to make progress in functional mobility however still requires assist in all levels of mobility  Will benefit from continued skilled PT to improve strength and endurance  Reviewed HEp and instructed patient to perfom HEP atleast 3x/day  Barriers to Discharge Inaccessible home environment;Decreased caregiver support   Goals   Patient Goals None expressed   LTG Expiration Date 03/12/18   Plan   Treatment/Interventions Functional transfer training;LE strengthening/ROM; Therapeutic exercise;Patient/family training;Equipment eval/education; Bed mobility;Gait training;Spoke to nursing;OT   Progress Slow progress, decreased activity tolerance   PT Frequency 5x/wk   Recommendation   Recommendation Post acute IP rehab   Equipment Recommended Herson Lassiter, PTA

## 2018-02-28 NOTE — PLAN OF CARE
Problem: OCCUPATIONAL THERAPY ADULT  Goal: Performs self-care activities at highest level of function for planned discharge setting  See evaluation for individualized goals  Treatment Interventions: ADL retraining, Functional transfer training, Endurance training, Patient/family training, Equipment evaluation/education, Compensatory technique education, Activityengagement          See flowsheet documentation for full assessment, interventions and recommendations  Outcome: Progressing  Limitation: Decreased ADL status, Decreased self-care trans, Decreased high-level ADLs, Decreased endurance  Prognosis: Good  Assessment: Pt was seen this date for OT tx session focusning on overall activity tolerance  Pt is able to doff/don socks seated in long sit in bed with back supported by [facility] elevated  Difficulity with same task seated EOB unsupported due to reports of pain in abdomen  Pt comeltes bed mobility tasks min A with increased time  Pt requires increased emotional support and encouragment throughout tx session as pt is anxious and fearful  With encouragment pt compeltes all funcitonal STS, trasnfer and functional mobility tasks with min A and assist of another for lines  Folliowing funcitonal mobility tasks pt reports increased pain and fatigues and not wanting to compelte any grooming or self care tasks  Requesting rest  Pt would benefit from continued OT tx to improve overall functional abilities  Continue follow pt with current poc    Recommendation:  (PT MAY BENEFIT FROM NEUROPSYCH CONSULT )  OT Discharge Recommendation: Short Term Rehab  OT - OK to Discharge: Yes (to STR when medically stable)  CHELSY Mansfield

## 2018-02-28 NOTE — PROGRESS NOTES
Called into pts room by pt, pt stating she was sleeping and heard what sounded like an air bubble and then felt wet all over gown  Upon assessment mid area of abdominal incision oozing purulent drainage and air bubbles noted  Dr Nga Riddle here and made aware of situation

## 2018-02-28 NOTE — PLAN OF CARE
CARDIOVASCULAR - ADULT     Maintains optimal cardiac output and hemodynamic stability Completed     Absence of cardiac dysrhythmias or at baseline rhythm Completed        GENITOURINARY - ADULT     Maintains or returns to baseline urinary function Completed     Absence of urinary retention Completed     Urinary catheter remains patent Completed        NEUROSENSORY - ADULT     Achieves stable or improved neurological status Completed     Achieves maximal functionality and self care Completed        RESPIRATORY - ADULT     Achieves optimal ventilation and oxygenation Completed          DISCHARGE PLANNING - CARE MANAGEMENT     Discharge to post-acute care or home with appropriate resources Progressing        GASTROINTESTINAL - ADULT     Minimal or absence of nausea and/or vomiting Progressing     Maintains or returns to baseline bowel function Progressing     Maintains adequate nutritional intake Progressing     Establish and maintain optimal ostomy function Progressing        HEMATOLOGIC - ADULT     Maintains hematologic stability Progressing        METABOLIC, FLUID AND ELECTROLYTES - ADULT     Electrolytes maintained within normal limits Progressing     Fluid balance maintained Progressing     Glucose maintained within target range Progressing        MUSCULOSKELETAL - ADULT     Maintain or return mobility to safest level of function Progressing     Maintain proper alignment of affected body part Progressing        Nutrition/Hydration-ADULT     Nutrient/Hydration intake appropriate for improving, restoring or maintaining nutritional needs Progressing        Potential for Falls     Patient will remain free of falls Progressing        Prexisting or High Potential for Compromised Skin Integrity     Skin integrity is maintained or improved Progressing        SKIN/TISSUE INTEGRITY - ADULT     Skin integrity remains intact Progressing     Incision(s), wounds(s) or drain site(s) healing without S/S of infection Progressing     Oral mucous membranes remain intact Progressing

## 2018-02-28 NOTE — SOCIAL WORK
CM met with pt and pt daughter and aware next step acute rehab is evaluating and cm awaiting determination  CM spoke with red surgery and if facility can accept with tube feedings and drains pt can transfer to facility   Cm will keep pt updated

## 2018-02-28 NOTE — PROGRESS NOTES
Progress Note - Acute Care Surgery   Gilles Cleaning 36 y o  female MRN: [id number]  Unit/Bed#: [facility] 834-01 Encounter: [id number]    Assessment:  42F with gastric perforation after hiatal hernia repair, s/p ex-lap and washout, repair of gastric perforation, EGD with stenting, IR drainage, EGD and repeat stenting x2  Plan:  - NPO  - continue tube feeds at goal  - JPs to suction, monitor output  - trend wbc  - abx to continue per ID  - OOB/ambulate  - SQH/SCDs  - dispo planning? Subjective/Objective     Subjective: Some nausea but no vomiting, tolerating tube feeds at goal  Pain stable  Objective:    Blood pressure 163/77, pulse (!) 113, temperature 98 8 °F (37 1 °C), temperature source Oral, resp  rate 20, height 5' 4" (1 626 m), weight 60 8 kg (134 lb 0 6 oz), SpO2 98 %, not currently breastfeeding  ,Body mass index is 23 01 kg/m²        Intake/Output Summary (Last 24 hours) at 02/28/18 0604  Last data filed at 02/28/18 0505   Gross per 24 hour   Intake             1625 ml   Output             2175 ml   Net             -550 ml       Invasive Devices     Peripherally Inserted Central Catheter Line            PICC Line 77/32/30 Right Basilic 38 days          Drain            Closed/Suction Drain Midline;Superior Abdomen Other (Comment) 10 Fr  11 days    Closed/Suction Drain Left;Lateral LUQ Bulb 12 Fr  4 days    NG/OG/Enteral Tube Nasogastric 8 Fr Left nares 4 days                Physical Exam:   NAD  Norm resp effort  Mildly tachycardic to 110's  Chest incision cdi  Abd soft, slightly distended, NT, still with some subq air, drains in place with no output in bulb        Results from last 7 days  Lab Units 02/26/18  0503 02/25/18  0503 02/24/18  0918   WBC Thousand/uL 19 44* 15 80* 14 43*   HEMOGLOBIN g/dL 9 5* 9 1* 9 4*   HEMATOCRIT % 31 9* 29 6* 29 9*   PLATELETS Thousands/uL 504* 526* 500*       Results from last 7 days  Lab Units 02/26/18  0503 02/25/18  0503 02/23/18  0454   SODIUM mmol/L 142 142 141 POTASSIUM mmol/L 3 9 3 3* 3 8   CHLORIDE mmol/L 103 101 101   CO2 mmol/L 36* 35* 35*   BUN mg/dL 14 18 12   CREATININE mg/dL 0 42* 0 49* 0 34*   GLUCOSE RANDOM mg/dL 178* 211* 119   CALCIUM mg/dL 10 0 10 4* 10 4*

## 2018-02-28 NOTE — PHYSICAL THERAPY NOTE
Physical Therapy Progress Note     18 1239   Pain Assessment   Pain Assessment 0-10   Pain Score 9   Pain Type Chronic pain;Surgical pain   Pain Location Abdomen; Chest   Pain Orientation Mid   Restrictions/Precautions   Weight Bearing Precautions Per Order No   Other Precautions Fall Risk;Multiple lines;Pain   General   Chart Reviewed Yes   Family/Caregiver Present No   Cognition   Overall Cognitive Status WFL   Subjective   Subjective Identified patient with name,  and bracelet ID  Patient reported pain in the chest and abdomen  Patient in bed supine upon entry  Bed Mobility   Supine to Sit 4  Minimal assistance   Additional items Assist x 1;Bedrails; Increased time required;LE management;Verbal cues   Transfers   Sit to Stand 4  Minimal assistance   Additional items Assist x 1;Assist x 2; Increased time required;Verbal cues;Armrests   Stand to Sit 4  Minimal assistance   Additional items Assist x 1; Increased time required;Verbal cues;Armrests   Ambulation/Elevation   Gait pattern Excessively slow; Short stride; Inconsistent bree;Decreased foot clearance   Gait Assistance 4  Minimal assist   Additional items Assist x 1;Assist x 2;Verbal cues; Other (Comment)  (chair follow)   Assistive Device Rolling walker   Distance 85ft x 2   Endurance Deficit   Endurance Deficit Yes   Endurance Deficit Description Fatigue and deconditioned   Activity Tolerance   Activity Tolerance Patient limited by fatigue;Patient tolerated treatment well   Nurse Made Aware Yes   Exercises   TKR Supine;AROM;20 reps;Bilateral   Assessment   Prognosis Fair   Problem List Decreased strength;Decreased range of motion;Decreased endurance; Impaired balance;Decreased mobility;Pain;Decreased safety awareness; Impaired judgement   Assessment Patient seen for PT session  Patient agreeable to PT session  patient reported pain in the abdomen and chest area 9/10  patient performed supine LE TE's actively and SLR only with AAROM   Supine to sit with Min A x 1  log roll technique instructed to aovid too much discomfort in the abdominal area  Performed STS and back transfer with Min A x 1 and another SBA with the first attempt and no SBA for second attempt  Patient able to ambulate increased distance in today's session  needed seated rest between ambulation due to fatigue  Patient seated on recliner wih LEs elevated and all needs within reach  Patient continues to make progress in functional mobility however still requires assist in all levels of mobility  Will benefit from continued skilled PT to improve strength and endurance  Reviewed HEp and instructed patient to perfom HEP atleast 3x/day  Barriers to Discharge Inaccessible home environment;Decreased caregiver support   Goals   Patient Goals None expressed   LTG Expiration Date 03/12/18   Plan   Treatment/Interventions Functional transfer training;LE strengthening/ROM; Therapeutic exercise;Patient/family training;Equipment eval/education; Bed mobility;Gait training;Spoke to nursing;OT   Progress Slow progress, decreased activity tolerance   PT Frequency 5x/wk   Recommendation   Recommendation Post acute IP rehab   Equipment Recommended Kandy Valdez PTA

## 2018-03-01 ENCOUNTER — APPOINTMENT (INPATIENT)
Dept: RADIOLOGY | Facility: HOSPITAL | Age: 43
DRG: 711 | End: 2018-03-01
Payer: COMMERCIAL

## 2018-03-01 LAB
ANION GAP SERPL CALCULATED.3IONS-SCNC: 4 MMOL/L (ref 4–13)
BASOPHILS # BLD AUTO: 0.04 THOUSANDS/ΜL (ref 0–0.1)
BASOPHILS NFR BLD AUTO: 0 % (ref 0–1)
BUN SERPL-MCNC: 15 MG/DL (ref 5–25)
CALCIUM SERPL-MCNC: 9.8 MG/DL (ref 8.3–10.1)
CHLORIDE SERPL-SCNC: 104 MMOL/L (ref 100–108)
CO2 SERPL-SCNC: 35 MMOL/L (ref 21–32)
CREAT SERPL-MCNC: 0.43 MG/DL (ref 0.6–1.3)
EOSINOPHIL # BLD AUTO: 0.31 THOUSAND/ΜL (ref 0–0.61)
EOSINOPHIL NFR BLD AUTO: 2 % (ref 0–6)
ERYTHROCYTE [DISTWIDTH] IN BLOOD BY AUTOMATED COUNT: 16.1 % (ref 11.6–15.1)
GFR SERPL CREATININE-BSD FRML MDRD: 125 ML/MIN/1.73SQ M
GLUCOSE SERPL-MCNC: 146 MG/DL (ref 65–140)
GLUCOSE SERPL-MCNC: 172 MG/DL (ref 65–140)
GLUCOSE SERPL-MCNC: 172 MG/DL (ref 65–140)
GLUCOSE SERPL-MCNC: 174 MG/DL (ref 65–140)
GLUCOSE SERPL-MCNC: 176 MG/DL (ref 65–140)
GLUCOSE SERPL-MCNC: 181 MG/DL (ref 65–140)
HCT VFR BLD AUTO: 28.7 % (ref 34.8–46.1)
HGB BLD-MCNC: 8.7 G/DL (ref 11.5–15.4)
LYMPHOCYTES # BLD AUTO: 1.2 THOUSANDS/ΜL (ref 0.6–4.47)
LYMPHOCYTES NFR BLD AUTO: 7 % (ref 14–44)
MCH RBC QN AUTO: 28.1 PG (ref 26.8–34.3)
MCHC RBC AUTO-ENTMCNC: 30.3 G/DL (ref 31.4–37.4)
MCV RBC AUTO: 93 FL (ref 82–98)
MONOCYTES # BLD AUTO: 0.97 THOUSAND/ΜL (ref 0.17–1.22)
MONOCYTES NFR BLD AUTO: 6 % (ref 4–12)
NEUTROPHILS # BLD AUTO: 14.7 THOUSANDS/ΜL (ref 1.85–7.62)
NEUTS SEG NFR BLD AUTO: 85 % (ref 43–75)
NRBC BLD AUTO-RTO: 0 /100 WBCS
PLATELET # BLD AUTO: 468 THOUSANDS/UL (ref 149–390)
PMV BLD AUTO: 9.7 FL (ref 8.9–12.7)
POTASSIUM SERPL-SCNC: 3.6 MMOL/L (ref 3.5–5.3)
RBC # BLD AUTO: 3.1 MILLION/UL (ref 3.81–5.12)
SODIUM SERPL-SCNC: 143 MMOL/L (ref 136–145)
WBC # BLD AUTO: 17.27 THOUSAND/UL (ref 4.31–10.16)

## 2018-03-01 PROCEDURE — 76080 X-RAY EXAM OF FISTULA: CPT | Performed by: RADIOLOGY

## 2018-03-01 PROCEDURE — 49424 ASSESS CYST CONTRAST INJECT: CPT | Performed by: RADIOLOGY

## 2018-03-01 PROCEDURE — C9113 INJ PANTOPRAZOLE SODIUM, VIA: HCPCS | Performed by: NURSE PRACTITIONER

## 2018-03-01 PROCEDURE — 82948 REAGENT STRIP/BLOOD GLUCOSE: CPT

## 2018-03-01 PROCEDURE — BW111ZZ FLUOROSCOPY OF ABDOMEN AND PELVIS USING LOW OSMOLAR CONTRAST: ICD-10-PCS | Performed by: RADIOLOGY

## 2018-03-01 PROCEDURE — 99024 POSTOP FOLLOW-UP VISIT: CPT | Performed by: SURGERY

## 2018-03-01 PROCEDURE — 49424 ASSESS CYST CONTRAST INJECT: CPT

## 2018-03-01 PROCEDURE — 80048 BASIC METABOLIC PNL TOTAL CA: CPT | Performed by: STUDENT IN AN ORGANIZED HEALTH CARE EDUCATION/TRAINING PROGRAM

## 2018-03-01 PROCEDURE — 99233 SBSQ HOSP IP/OBS HIGH 50: CPT | Performed by: INTERNAL MEDICINE

## 2018-03-01 PROCEDURE — 85025 COMPLETE CBC W/AUTO DIFF WBC: CPT | Performed by: STUDENT IN AN ORGANIZED HEALTH CARE EDUCATION/TRAINING PROGRAM

## 2018-03-01 PROCEDURE — 76080 X-RAY EXAM OF FISTULA: CPT

## 2018-03-01 RX ORDER — OXYCODONE HCL 5 MG/5 ML
5 SOLUTION, ORAL ORAL EVERY 4 HOURS PRN
Status: DISCONTINUED | OUTPATIENT
Start: 2018-03-01 | End: 2018-03-09 | Stop reason: HOSPADM

## 2018-03-01 RX ORDER — OXYCODONE HCL 5 MG/5 ML
10 SOLUTION, ORAL ORAL EVERY 4 HOURS PRN
Status: DISCONTINUED | OUTPATIENT
Start: 2018-03-01 | End: 2018-03-09 | Stop reason: HOSPADM

## 2018-03-01 RX ADMIN — HYDROMORPHONE HYDROCHLORIDE 1 MG: 1 INJECTION, SOLUTION INTRAMUSCULAR; INTRAVENOUS; SUBCUTANEOUS at 08:49

## 2018-03-01 RX ADMIN — HYDROMORPHONE HYDROCHLORIDE 1 MG: 1 INJECTION, SOLUTION INTRAMUSCULAR; INTRAVENOUS; SUBCUTANEOUS at 11:24

## 2018-03-01 RX ADMIN — INSULIN LISPRO 1 UNITS: 100 INJECTION, SOLUTION INTRAVENOUS; SUBCUTANEOUS at 00:22

## 2018-03-01 RX ADMIN — INSULIN LISPRO 1 UNITS: 100 INJECTION, SOLUTION INTRAVENOUS; SUBCUTANEOUS at 23:28

## 2018-03-01 RX ADMIN — ONDANSETRON 4 MG: 2 INJECTION INTRAMUSCULAR; INTRAVENOUS at 11:31

## 2018-03-01 RX ADMIN — PANTOPRAZOLE SODIUM 40 MG: 40 INJECTION, POWDER, FOR SOLUTION INTRAVENOUS at 08:50

## 2018-03-01 RX ADMIN — HEPARIN SODIUM 5000 UNITS: 5000 INJECTION, SOLUTION INTRAVENOUS; SUBCUTANEOUS at 22:40

## 2018-03-01 RX ADMIN — MIRTAZAPINE 15 MG: 15 TABLET, ORALLY DISINTEGRATING ORAL at 22:40

## 2018-03-01 RX ADMIN — IOHEXOL 30 ML: 300 INJECTION, SOLUTION INTRAVENOUS at 18:16

## 2018-03-01 RX ADMIN — HYDROMORPHONE HYDROCHLORIDE 1 MG: 1 INJECTION, SOLUTION INTRAMUSCULAR; INTRAVENOUS; SUBCUTANEOUS at 01:33

## 2018-03-01 RX ADMIN — FLUCONAZOLE 400 MG: 2 INJECTION INTRAVENOUS at 15:19

## 2018-03-01 RX ADMIN — Medication 3 G: at 23:15

## 2018-03-01 RX ADMIN — ONDANSETRON 4 MG: 2 INJECTION INTRAMUSCULAR; INTRAVENOUS at 05:29

## 2018-03-01 RX ADMIN — INSULIN LISPRO 1 UNITS: 100 INJECTION, SOLUTION INTRAVENOUS; SUBCUTANEOUS at 06:27

## 2018-03-01 RX ADMIN — Medication 3 G: at 05:35

## 2018-03-01 RX ADMIN — OXYCODONE HYDROCHLORIDE 10 MG: 5 SOLUTION ORAL at 14:06

## 2018-03-01 RX ADMIN — METOPROLOL TARTRATE 10 MG: 1 INJECTION, SOLUTION INTRAVENOUS at 05:33

## 2018-03-01 RX ADMIN — METOPROLOL TARTRATE 10 MG: 1 INJECTION, SOLUTION INTRAVENOUS at 18:43

## 2018-03-01 RX ADMIN — ONDANSETRON 4 MG: 2 INJECTION INTRAMUSCULAR; INTRAVENOUS at 01:33

## 2018-03-01 RX ADMIN — INSULIN LISPRO 1 UNITS: 100 INJECTION, SOLUTION INTRAVENOUS; SUBCUTANEOUS at 12:03

## 2018-03-01 RX ADMIN — ONDANSETRON 4 MG: 2 INJECTION INTRAMUSCULAR; INTRAVENOUS at 23:08

## 2018-03-01 RX ADMIN — OXYCODONE HYDROCHLORIDE 10 MG: 5 SOLUTION ORAL at 18:43

## 2018-03-01 RX ADMIN — METOPROLOL TARTRATE 10 MG: 1 INJECTION, SOLUTION INTRAVENOUS at 23:10

## 2018-03-01 RX ADMIN — HYDROMORPHONE HYDROCHLORIDE 1 MG: 1 INJECTION, SOLUTION INTRAMUSCULAR; INTRAVENOUS; SUBCUTANEOUS at 03:33

## 2018-03-01 RX ADMIN — Medication 3 G: at 18:43

## 2018-03-01 RX ADMIN — ONDANSETRON 4 MG: 2 INJECTION INTRAMUSCULAR; INTRAVENOUS at 18:43

## 2018-03-01 RX ADMIN — GABAPENTIN 300 MG: 250 SOLUTION ORAL at 22:44

## 2018-03-01 RX ADMIN — Medication 3 G: at 11:19

## 2018-03-01 RX ADMIN — OXYCODONE HYDROCHLORIDE 10 MG: 5 SOLUTION ORAL at 23:16

## 2018-03-01 RX ADMIN — METOPROLOL TARTRATE 10 MG: 1 INJECTION, SOLUTION INTRAVENOUS at 11:20

## 2018-03-01 RX ADMIN — HYDROMORPHONE HYDROCHLORIDE 1 MG: 1 INJECTION, SOLUTION INTRAMUSCULAR; INTRAVENOUS; SUBCUTANEOUS at 05:33

## 2018-03-01 RX ADMIN — HEPARIN SODIUM 5000 UNITS: 5000 INJECTION, SOLUTION INTRAVENOUS; SUBCUTANEOUS at 08:49

## 2018-03-01 NOTE — PROGRESS NOTES
Progress Note - Infectious Disease   Daya Cueto 36 y o  female MRN: [id number]  Unit/Bed#: [facility] 834-01 Encounter: [id number]      Impression/Recommendations:  1   Intra-abdominal/pelvic abscesses   Patient is status post multiple abdominal washouts   She has multiple drains in place   She is status post placement of drainage in a new perisplenic collection   She is clinically well and systemically stable  Operative cultures consistently growing  only ampicillin susceptible Enterococcus   A single culture also grew Saccharomyces, most likely colonization   Patient had a new drain placed in left abdomen on 02/16   Culture this drainage is growing Enterococcus again, with no other pathogens   WBC decreased after stent revision but now increased again over the last few days    Continue with IV Unasyn/fluconazole  Continue drainage  Treat x at least 14 days from last drainage, another 2 days   May need to continue if there are new abscesses  Will need to monitor WBC closely  Consider repeat imaging of the abdomen/pelvis      2   Gastric perforation, status post repair, with persistent leak   She is now status post placement of esophageal stent   Barium swallow from today showed persistent leak   Patient is status post esophageal stent exchange with persistent leak   She is now status post a new stent inside initial stent   WBC decreased afterwards but now increased again   Upper GI with persistent leak   This is most likely etiology of increasing WBC  Monitor for recurrent leak      3   Abdominal wound dehiscent with probable subcutaneously abscess, status post spontaneous drainage  There is still significant seropurulent wound drainage today  Will defer to surgery as to whether incision need to be further open to allow more complete drainage  No change antibiotic plan above  Wound care per surgery service      4  Persistent leukocytosis   WBC decreased with spontaneous drainage of abdominal wall subcutaneously abscess  This is most likely etiology of persistent leukocytosis  Antibiotic plan as in above  Monitor WBC and temperature closely     Consider repeat abdomen/pelvis CT to look for new undrained abscesses      5   Possible infected  shunt   Given presence of tip of  shunt in peritoneal space, there is high probability of  shunt infection   Fortunately, patient has no symptoms concerning for meningitis   She has neurological deficits   She is status post tapping of  shunt at [facility]   CSF culture there had no growth but patient had prior antibiotic  Alicia Orta will go ahead and treat her for possible/presumptive  shunt infection   Patient is now status post  shunt resection   CSF culture here also negative  Charla Campuzano completed 2 weeks of antibiotic after shunt removal     No further antibiotic needed for this      6   Bilateral pleural effusion, status post chest tube placement   This is most likely sympathetic effusion, secondary to intra-abdominal abscesses   Chest tubes have been removed   Patient remains comfortable      Discussed with the patient in detail regarding above plan      Antibiotics:  Unasyn  Fluconazole # 30  POD # 34  Post/drainage # 12     Subjective: The patient developed purulent drainage from abdominal incision last night  No change in abdominal pain  Patient has stable chest wall pain from esophageal stent  She is tolerating tube feed  Temperature low-grade   No chills  She is tolerating antibiotics well   No nausea, vomiting or diarrhea  Objective:  Vitals:  HR:  [100-113] 100  Resp:  [20-22] 20  BP: (143-164)/(74-86) 164/86  SpO2:  [91 %-94 %] 91 %  Temp (24hrs), Av 2 °F (37 3 °C), Min:98 4 °F (36 9 °C), Max:99 9 °F (37 7 °C)  Current: Temperature: 98 4 °F (36 9 °C)    Physical Exam:     General: Awake, alert, cooperative, no distress  Lungs: Expansion symmetric, no rales, no wheezing, respirations unlabored     Heart[de-identified]  Tachycardic with regular rhythm, S1 and S2 normal, no murmur  Abdomen: Soft, nondistended  Midline incision with dehiscence in center of incision, with copious seropurulent drainage  No erythema  Minimal tenderness  Drains with decreased seropurulence  Extremities: Trace edema  No erythema/warmth  No ulcer  Nontender to palpation  Skin:  No rash  Invasive Devices     Peripherally Inserted Central Catheter Line            PICC Line 17/73/97 Right Basilic 39 days          Drain            Closed/Suction Drain Midline;Superior Abdomen Other (Comment) 10 Fr  12 days    Closed/Suction Drain Left;Lateral LUQ Bulb 12 Fr  5 days    NG/OG/Enteral Tube Nasogastric 8 Fr Left nares 5 days                Labs studies:   I have personally reviewed pertinent labs  Results from last 7 days  Lab Units 03/01/18  0456 02/28/18  0515 02/26/18  0503   SODIUM mmol/L 143 143 142   POTASSIUM mmol/L 3 6 3 4* 3 9   CHLORIDE mmol/L 104 102 103   CO2 mmol/L 35* 37* 36*   ANION GAP mmol/L 4 4 3*   BUN mg/dL 15 15 14   CREATININE mg/dL 0 43* 0 46* 0 42*   EGFR ml/min/1 73sq m 125 122 126   GLUCOSE RANDOM mg/dL 176* 202* 178*   CALCIUM mg/dL 9 8 10 0 10 0       Results from last 7 days  Lab Units 03/01/18  0456 02/28/18  0515 02/26/18  0503   WBC Thousand/uL 17 27* 24 08* 19 44*   HEMOGLOBIN g/dL 8 7* 9 3* 9 5*   PLATELETS Thousands/uL 468* 456* 504*           Imaging Studies:   I have personally reviewed pertinent imaging study reports and images in PACS  EKG, Pathology, and Other Studies:   I have personally reviewed pertinent reports

## 2018-03-01 NOTE — OCCUPATIONAL THERAPY NOTE
Cancellation note: Patient sound asleep; called her name x 3  Patient allowed to remain resting for now  Will continue to follow, return later    CHELSY Brito

## 2018-03-01 NOTE — PROGRESS NOTES
Called by nursing to evaluate patient for purulence draining from midline incision  Patient states that around 1-2pm she felt a "pop" and had a gush of fluid that completely saturated her gown from her midline incision  Patient was premedicated with 1mg dilaudid for pain control  Upon my exam there was a pinpoint opening in the supraumbilical aspect of the midline incision  With gentle pressure I was able to express 100-150cc thick purulent fluid from the opening  Cotton-tipped applicator was gently probed into the wound; the cavity extended superiorly by about 6-7cm  The fascia seemed intact as I could not probe more than 1-2cm deep in the area of the cavity  Small amount of half inch plain packing placed into opening and dressed with 4x4 and abd  Will reassess in AM on rounds  Nursing may change dressing as needed

## 2018-03-01 NOTE — PROGRESS NOTES
Progress Note - Thoracic Surgery   Real Gallo 36 y o  female MRN: [id number]  Unit/Bed#: [facility] 834-01 Encounter: [id number]    Assessment:  36 F with gastric perforation after hiatal hernia, s/p ex-lap washout, EGD and stent placement x2    Plan:  Continue NPO  Continue TF at goal  Antiemetics  Will need long term enteral nutrition to heal leak    Subjective/Objective     Subjective: No acute events  Some nausea which resolved after medication, no vomiting  Objective:    Blood pressure 150/80, pulse 100, temperature 99 3 °F (37 4 °C), temperature source Oral, resp  rate 22, height 5' 4" (1 626 m), weight 60 8 kg (134 lb 0 6 oz), SpO2 93 %, not currently breastfeeding  ,Body mass index is 23 01 kg/m²  Intake/Output Summary (Last 24 hours) at 03/01/18 0659  Last data filed at 03/01/18 5079   Gross per 24 hour   Intake              830 ml   Output             2770 ml   Net            -1940 ml       Invasive Devices     Peripherally Inserted Central Catheter Line            PICC Line 57/78/19 Right Basilic 39 days          Drain            Closed/Suction Drain Midline;Superior Abdomen Other (Comment) 10 Fr  12 days    Closed/Suction Drain Left;Lateral LUQ Bulb 12 Fr  5 days    NG/OG/Enteral Tube Nasogastric 8 Fr Left nares 5 days                Physical Exam:   General: NAD, AAOx3  CV: RRR +S1/S2  Chest: breath sounds bilaterally  Abdomen: soft, mildly tender, non-distended   Packing in place with mild erythema  Drains in place with some purulent output  Keofed in place  Extremities: atraumatic, no edema        Results from last 7 days  Lab Units 03/01/18 0456 02/28/18  0515 02/26/18  0503   WBC Thousand/uL 17 27* 24 08* 19 44*   HEMOGLOBIN g/dL 8 7* 9 3* 9 5*   HEMATOCRIT % 28 7* 30 6* 31 9*   PLATELETS Thousands/uL 468* 456* 504*       Results from last 7 days  Lab Units 03/01/18 0456 02/28/18  0515 02/26/18  0503   SODIUM mmol/L 143 143 142   POTASSIUM mmol/L 3 6 3 4* 3 9   CHLORIDE mmol/L 104 102 103 CO2 mmol/L 35* 37* 36*   BUN mg/dL 15 15 14   CREATININE mg/dL 0 43* 0 46* 0 42*   GLUCOSE RANDOM mg/dL 176* 202* 178*   CALCIUM mg/dL 9 8 10 0 10 0

## 2018-03-01 NOTE — SOCIAL WORK
Pt not medically clear for discharge persistent abdominal leak , wbc elevated , new IR drain placement  Pt will continue with tube feeding , iv abx   When medically clear next step acute rehab following

## 2018-03-01 NOTE — PLAN OF CARE
DISCHARGE PLANNING - CARE MANAGEMENT     Discharge to post-acute care or home with appropriate resources Progressing        GASTROINTESTINAL - ADULT     Minimal or absence of nausea and/or vomiting Progressing     Maintains or returns to baseline bowel function Progressing     Maintains adequate nutritional intake Progressing     Establish and maintain optimal ostomy function Progressing        HEMATOLOGIC - ADULT     Maintains hematologic stability Progressing        METABOLIC, FLUID AND ELECTROLYTES - ADULT     Electrolytes maintained within normal limits Progressing     Fluid balance maintained Progressing     Glucose maintained within target range Progressing        MUSCULOSKELETAL - ADULT     Maintain or return mobility to safest level of function Progressing     Maintain proper alignment of affected body part Progressing        Nutrition/Hydration-ADULT     Nutrient/Hydration intake appropriate for improving, restoring or maintaining nutritional needs Progressing        Potential for Falls     Patient will remain free of falls Progressing        Prexisting or High Potential for Compromised Skin Integrity     Skin integrity is maintained or improved Progressing        SKIN/TISSUE INTEGRITY - ADULT     Skin integrity remains intact Progressing     Incision(s), wounds(s) or drain site(s) healing without S/S of infection Progressing     Oral mucous membranes remain intact Progressing

## 2018-03-01 NOTE — PROGRESS NOTES
Progress Note - Acute Care Surgery   Sally Garcia 36 y o  female MRN: [id number]  Unit/Bed#: [facility] 834-01 Encounter: [id number]    Assessment:  42F with gastric perforation after hiatal hernia repair, s/p ex-lap and washout, repair of gastric perforation, EGD with stenting, IR drainage, EGD and repeat stenting x2   - yesterday midline incision began draining copious amounts of thick purulent fluid    Plan:  - NPO  - continue tube feeds at goal  - JPs to suction, monitor output  - trend wbc  - small amount of packing in midline defect, local dressings to be changed prn  - abx to continue per ID until at least tomorrow 3/2  - OOB/ambulate  - SQH/SCDs  - dispo planning per CM      Subjective/Objective     Subjective: Some nausea but no vomiting, tolerating tube feeds at goal  Pain stable  Purulence expressed from pinpoint defect in midline incision, saturated dressings multiple times overnight  Objective:    Blood pressure 159/79, pulse (!) 113, temperature 99 3 °F (37 4 °C), temperature source Oral, resp  rate 22, height 5' 4" (1 626 m), weight 60 8 kg (134 lb 0 6 oz), SpO2 93 %, not currently breastfeeding  ,Body mass index is 23 01 kg/m²        Intake/Output Summary (Last 24 hours) at 03/01/18 0435  Last data filed at 03/01/18 0314   Gross per 24 hour   Intake             1625 ml   Output             2690 ml   Net            -1065 ml       Invasive Devices     Peripherally Inserted Central Catheter Line            PICC Line 53/92/87 Right Basilic 39 days          Drain            Closed/Suction Drain Midline;Superior Abdomen Other (Comment) 10 Fr  12 days    Closed/Suction Drain Left;Lateral LUQ Bulb 12 Fr  5 days    NG/OG/Enteral Tube Nasogastric 8 Fr Left nares 5 days                Physical Exam:   NAD  Norm resp effort  Mildly tachycardic to 110's  Chest incision cdi  Abd soft, slightly distended, NT, dressing with mod amt purulent drainage, packing replaced, drains in place with purulence in L flank ADENIKE        Results from last 7 days  Lab Units 02/28/18  0515 02/26/18  0503 02/25/18  0503   WBC Thousand/uL 24 08* 19 44* 15 80*   HEMOGLOBIN g/dL 9 3* 9 5* 9 1*   HEMATOCRIT % 30 6* 31 9* 29 6*   PLATELETS Thousands/uL 456* 504* 526*       Results from last 7 days  Lab Units 02/28/18  0515 02/26/18  0503 02/25/18  0503   SODIUM mmol/L 143 142 142   POTASSIUM mmol/L 3 4* 3 9 3 3*   CHLORIDE mmol/L 102 103 101   CO2 mmol/L 37* 36* 35*   BUN mg/dL 15 14 18   CREATININE mg/dL 0 46* 0 42* 0 49*   GLUCOSE RANDOM mg/dL 202* 178* 211*   CALCIUM mg/dL 10 0 10 0 10 4*

## 2018-03-02 LAB
ANION GAP SERPL CALCULATED.3IONS-SCNC: 3 MMOL/L (ref 4–13)
BASOPHILS # BLD AUTO: 0.05 THOUSANDS/ΜL (ref 0–0.1)
BASOPHILS NFR BLD AUTO: 0 % (ref 0–1)
BUN SERPL-MCNC: 15 MG/DL (ref 5–25)
CALCIUM SERPL-MCNC: 9.9 MG/DL (ref 8.3–10.1)
CHLORIDE SERPL-SCNC: 104 MMOL/L (ref 100–108)
CO2 SERPL-SCNC: 35 MMOL/L (ref 21–32)
CREAT SERPL-MCNC: 0.47 MG/DL (ref 0.6–1.3)
EOSINOPHIL # BLD AUTO: 0.63 THOUSAND/ΜL (ref 0–0.61)
EOSINOPHIL NFR BLD AUTO: 5 % (ref 0–6)
ERYTHROCYTE [DISTWIDTH] IN BLOOD BY AUTOMATED COUNT: 15.8 % (ref 11.6–15.1)
GFR SERPL CREATININE-BSD FRML MDRD: 121 ML/MIN/1.73SQ M
GLUCOSE SERPL-MCNC: 153 MG/DL (ref 65–140)
GLUCOSE SERPL-MCNC: 170 MG/DL (ref 65–140)
GLUCOSE SERPL-MCNC: 174 MG/DL (ref 65–140)
GLUCOSE SERPL-MCNC: 186 MG/DL (ref 65–140)
HCT VFR BLD AUTO: 30.5 % (ref 34.8–46.1)
HGB BLD-MCNC: 9.1 G/DL (ref 11.5–15.4)
LYMPHOCYTES # BLD AUTO: 1.55 THOUSANDS/ΜL (ref 0.6–4.47)
LYMPHOCYTES NFR BLD AUTO: 12 % (ref 14–44)
MAGNESIUM SERPL-MCNC: 2.2 MG/DL (ref 1.6–2.6)
MCH RBC QN AUTO: 27.5 PG (ref 26.8–34.3)
MCHC RBC AUTO-ENTMCNC: 29.8 G/DL (ref 31.4–37.4)
MCV RBC AUTO: 92 FL (ref 82–98)
MONOCYTES # BLD AUTO: 0.8 THOUSAND/ΜL (ref 0.17–1.22)
MONOCYTES NFR BLD AUTO: 6 % (ref 4–12)
NEUTROPHILS # BLD AUTO: 10.07 THOUSANDS/ΜL (ref 1.85–7.62)
NEUTS SEG NFR BLD AUTO: 77 % (ref 43–75)
NRBC BLD AUTO-RTO: 0 /100 WBCS
PLATELET # BLD AUTO: 507 THOUSANDS/UL (ref 149–390)
PMV BLD AUTO: 9.7 FL (ref 8.9–12.7)
POTASSIUM SERPL-SCNC: 3.6 MMOL/L (ref 3.5–5.3)
RBC # BLD AUTO: 3.31 MILLION/UL (ref 3.81–5.12)
SODIUM SERPL-SCNC: 142 MMOL/L (ref 136–145)
WBC # BLD AUTO: 13.14 THOUSAND/UL (ref 4.31–10.16)

## 2018-03-02 PROCEDURE — 97530 THERAPEUTIC ACTIVITIES: CPT | Performed by: PHYSICAL THERAPIST

## 2018-03-02 PROCEDURE — 83735 ASSAY OF MAGNESIUM: CPT | Performed by: SURGERY

## 2018-03-02 PROCEDURE — 99233 SBSQ HOSP IP/OBS HIGH 50: CPT | Performed by: INTERNAL MEDICINE

## 2018-03-02 PROCEDURE — 97110 THERAPEUTIC EXERCISES: CPT | Performed by: PHYSICAL THERAPIST

## 2018-03-02 PROCEDURE — 99024 POSTOP FOLLOW-UP VISIT: CPT | Performed by: SURGERY

## 2018-03-02 PROCEDURE — 82948 REAGENT STRIP/BLOOD GLUCOSE: CPT

## 2018-03-02 PROCEDURE — C9113 INJ PANTOPRAZOLE SODIUM, VIA: HCPCS | Performed by: NURSE PRACTITIONER

## 2018-03-02 PROCEDURE — 97116 GAIT TRAINING THERAPY: CPT | Performed by: PHYSICAL THERAPIST

## 2018-03-02 PROCEDURE — 85025 COMPLETE CBC W/AUTO DIFF WBC: CPT | Performed by: SURGERY

## 2018-03-02 PROCEDURE — 97535 SELF CARE MNGMENT TRAINING: CPT

## 2018-03-02 PROCEDURE — 80048 BASIC METABOLIC PNL TOTAL CA: CPT | Performed by: SURGERY

## 2018-03-02 RX ADMIN — Medication 3 G: at 11:52

## 2018-03-02 RX ADMIN — INSULIN LISPRO 1 UNITS: 100 INJECTION, SOLUTION INTRAVENOUS; SUBCUTANEOUS at 06:05

## 2018-03-02 RX ADMIN — OXYCODONE HYDROCHLORIDE 10 MG: 5 SOLUTION ORAL at 21:14

## 2018-03-02 RX ADMIN — INSULIN LISPRO 1 UNITS: 100 INJECTION, SOLUTION INTRAVENOUS; SUBCUTANEOUS at 18:15

## 2018-03-02 RX ADMIN — METOPROLOL TARTRATE 10 MG: 1 INJECTION, SOLUTION INTRAVENOUS at 11:51

## 2018-03-02 RX ADMIN — Medication 3 G: at 05:51

## 2018-03-02 RX ADMIN — OXYCODONE HYDROCHLORIDE 10 MG: 5 SOLUTION ORAL at 10:43

## 2018-03-02 RX ADMIN — ONDANSETRON 4 MG: 2 INJECTION INTRAMUSCULAR; INTRAVENOUS at 06:11

## 2018-03-02 RX ADMIN — INSULIN LISPRO 1 UNITS: 100 INJECTION, SOLUTION INTRAVENOUS; SUBCUTANEOUS at 11:52

## 2018-03-02 RX ADMIN — OXYCODONE HYDROCHLORIDE 10 MG: 5 SOLUTION ORAL at 14:46

## 2018-03-02 RX ADMIN — HEPARIN SODIUM 5000 UNITS: 5000 INJECTION, SOLUTION INTRAVENOUS; SUBCUTANEOUS at 21:15

## 2018-03-02 RX ADMIN — OXYCODONE HYDROCHLORIDE 10 MG: 5 SOLUTION ORAL at 05:51

## 2018-03-02 RX ADMIN — HEPARIN SODIUM 5000 UNITS: 5000 INJECTION, SOLUTION INTRAVENOUS; SUBCUTANEOUS at 09:34

## 2018-03-02 RX ADMIN — PANTOPRAZOLE SODIUM 40 MG: 40 INJECTION, POWDER, FOR SOLUTION INTRAVENOUS at 09:34

## 2018-03-02 RX ADMIN — FLUCONAZOLE 400 MG: 2 INJECTION INTRAVENOUS at 16:00

## 2018-03-02 RX ADMIN — ONDANSETRON 4 MG: 2 INJECTION INTRAMUSCULAR; INTRAVENOUS at 21:17

## 2018-03-02 RX ADMIN — Medication 3 G: at 17:42

## 2018-03-02 RX ADMIN — METOPROLOL TARTRATE 10 MG: 1 INJECTION, SOLUTION INTRAVENOUS at 17:39

## 2018-03-02 RX ADMIN — METOPROLOL TARTRATE 10 MG: 1 INJECTION, SOLUTION INTRAVENOUS at 06:04

## 2018-03-02 NOTE — PLAN OF CARE
Problem: OCCUPATIONAL THERAPY ADULT  Goal: Performs self-care activities at highest level of function for planned discharge setting  See evaluation for individualized goals  Treatment Interventions: ADL retraining, Functional transfer training, Endurance training, Patient/family training, Equipment evaluation/education, Compensatory technique education, Activityengagement          See flowsheet documentation for full assessment, interventions and recommendations  Outcome: Progressing  Limitation: Decreased ADL status, Decreased self-care trans, Decreased high-level ADLs, Decreased endurance  Prognosis: Good  Assessment: Pt participated in skilled OT session this date focusing on LB dressing, functional standing tolerance, and functional mobility  Pt agreeable to participate in OT tx session  Upon arrival, pt seated in chair  Following tx session, pt supine in bed, all needs within reach  Pt requiring SBA for LB dressing (socks and pants) seated in chair, Min A to pull up in stance  Pt requiring Mod A for sit>stand transfer, Min A for functional mobility in room w/ Rw  Pt requiring Max A for toileting hygiene  Pt requiring SBA for grooming seated EOB  Pt requesting to participate in UB exercises  Pt completed UB functional reaching tasks seated EOB  Pt requiring Min A to transfer from sit>supine  In comparison to previous sessions, pt w/ improvements in mobility, ADL activity participation  Pt continues to be functioning below baseline level 2* decreased mobility, increased pain, decreased dynamic balance, decreased activity tolerance  From OT standpoint, continue to recommend STR at d/c  Continue to follow pt in hospital for OT treatments to address performance deficits and to maximize safety and independence w/ ADLs and functional mobility    Recommendation:  (PT MAY BENEFIT FROM NEUROPSYCH CONSULT )  OT Discharge Recommendation: Short Term Rehab  OT - OK to Discharge: Yes (when medically appropriate)

## 2018-03-02 NOTE — PLAN OF CARE
Problem: PHYSICAL THERAPY ADULT  Goal: Performs mobility at highest level of function for planned discharge setting  See evaluation for individualized goals  Treatment/Interventions: Functional transfer training, LE strengthening/ROM, Therapeutic exercise, Endurance training, Bed mobility, Spoke to nursing  Equipment Recommended:  (R/O LEAST RESTRICTIVE AD )       See flowsheet documentation for full assessment, interventions and recommendations  Outcome: Progressing  Prognosis: Good  Problem List: Decreased strength, Decreased endurance, Impaired balance, Decreased mobility, Decreased safety awareness, Decreased skin integrity, Pain  Assessment: pt demonstrated improved mobility and activity tolerance, pt able to amb 150', perform ther ex, then toilet training  pt did need max assist to rise from toilet  pt continues to have strength deficit ble's with strength in 4-/5 range  pt was provided with [facility] and should use bathroom for toileting rather than bedpan  pt needs continued PT and rehab  Barriers to Discharge: Inaccessible home environment     Recommendation: Post acute IP rehab     PT - OK to Discharge: Yes (rehab)    See flowsheet documentation for full assessment

## 2018-03-02 NOTE — PROGRESS NOTES
Progress Note - Infectious Disease   Leslie Mcfarland 36 y o  female MRN: [id number]  Unit/Bed#: [facility] 834-01 Encounter: [id number]      Impression/Recommendations:  1   Intra-abdominal/pelvic abscesses   Patient is status post multiple abdominal washouts   She has multiple drains in place   She is status post placement of drainage in a new perisplenic collection   She is clinically well and systemically stable  Operative cultures consistently growing  only ampicillin susceptible Enterococcus   A single culture also grew Saccharomyces, most likely colonization   Patient had a new drain placed in left abdomen on 02/16   Culture this drainage is growing Enterococcus again, with no other pathogens   WBC decreased again with abdominal wound drainage  Continue with IV Unasyn/fluconazole  Continue drainage  Treat x 14 days from last drainage, another 1 day, through tomorrow  Monitor WBC closely      2   Gastric perforation, status post repair, with persistent leak   She is now status post placement of esophageal stent   Barium swallow from today showed persistent leak   Patient is status post esophageal stent exchange with persistent leak   She is now status post a new stent inside initial stent   WBC decreased afterwards but now increased again   Upper GI with persistent leak   This is most likely etiology of increasing WBC  Monitor for recurrent leak      3   Abdominal wound dehiscent with probable subcutaneously abscess, status post spontaneous drainage  There is still significant seropurulent wound drainage today  Will defer to surgery as to whether incision need to be further open to allow more complete drainage  WBC decreased after abdominal wound drainage  No change antibiotic plan above  Wound care per surgery service      4  Persistent leukocytosis   WBC decreased with spontaneous drainage of abdominal wall subcutaneously abscess  This is most likely etiology of persistent leukocytosis    Antibiotic plan as in above  Monitor WBC and temperature closely        5   Possible infected  shunt   Given presence of tip of  shunt in peritoneal space, there is high probability of  shunt infection   Fortunately, patient has no symptoms concerning for meningitis   She has neurological deficits   She is status post tapping of  shunt at [facility]   CSF culture there had no growth but patient had prior antibiotic  Bernadette Marshall will go ahead and treat her for possible/presumptive  shunt infection   Patient is now status post  shunt resection   CSF culture here also negative  Mini Sheffield completed 2 weeks of antibiotic after shunt removal     No further antibiotic needed for this      6   Bilateral pleural effusion, status post chest tube placement   This is most likely sympathetic effusion, secondary to intra-abdominal abscesses   Chest tubes have been removed   Patient remains comfortable      Discussed with the patient and her fiance in detail regarding above plan      Antibiotics:  Unasyn  Fluconazole # 31  POD # 35  Post/drainage # 13     Subjective:  Purulent drainage from abdominal wound unchanged  No change in abdominal pain, controlled  Patient has stable chest wall pain from esophageal stent  She is tolerating tube feed  Temperature is down   No chills  She is tolerating antibiotics well   No nausea, vomiting or diarrhea  Objective:  Vitals:  HR:  [92-99] 93  Resp:  [16-18] 18  BP: (150-162)/(84-92) 159/88  SpO2:  [90 %-98 %] 90 %  Temp (24hrs), Av °F (37 2 °C), Min:98 7 °F (37 1 °C), Max:99 3 °F (37 4 °C)  Current: Temperature: 99 3 °F (37 4 °C)    Physical Exam:     General: Awake, alert, cooperative, no distress  Lungs: Expansion symmetric, no rales, no wheezing, respirations unlabored  Heart[de-identified]  Mildly tachycardic with red rhythm, S1 and S2 normal, no murmur  Abdomen: Soft, nondistended  Midline incision with purulent drainage in mid incision  No erythema/warmth  Mild tenderness    Drains mostly serous  Extremities: Stable mild edema  No erythema/warmth  No ulcer  Nontender to palpation  Skin:  No rash  Invasive Devices     Peripherally Inserted Central Catheter Line            PICC Line 87/96/87 Right Basilic 40 days          Drain            Closed/Suction Drain Midline;Superior Abdomen Other (Comment) 10 Fr  13 days    Closed/Suction Drain Left;Lateral LUQ Bulb 12 Fr  6 days    NG/OG/Enteral Tube Nasogastric 8 Fr Left nares 6 days                Labs studies:   I have personally reviewed pertinent labs  Results from last 7 days  Lab Units 03/02/18  0558 03/01/18  0456 02/28/18  0515   SODIUM mmol/L 142 143 143   POTASSIUM mmol/L 3 6 3 6 3 4*   CHLORIDE mmol/L 104 104 102   CO2 mmol/L 35* 35* 37*   ANION GAP mmol/L 3* 4 4   BUN mg/dL 15 15 15   CREATININE mg/dL 0 47* 0 43* 0 46*   EGFR ml/min/1 73sq m 121 125 122   GLUCOSE RANDOM mg/dL 153* 176* 202*   CALCIUM mg/dL 9 9 9 8 10 0       Results from last 7 days  Lab Units 03/02/18  0558 03/01/18  0456 02/28/18  0515   WBC Thousand/uL 13 14* 17 27* 24 08*   HEMOGLOBIN g/dL 9 1* 8 7* 9 3*   PLATELETS Thousands/uL 507* 468* 456*           Imaging Studies:   I have personally reviewed pertinent imaging study reports and images in PACS  EKG, Pathology, and Other Studies:   I have personally reviewed pertinent reports

## 2018-03-02 NOTE — BRIEF OP NOTE (RAD/CATH)
2 x ABSCESS DRAINAGE TUBE CHECK:  Procedure Note    PATIENT NAME: Octavia Srinivasan  : 1975  MRN: [id number]     Pre-op Diagnosis:   1  Gastric leak    2   (ventriculoperitoneal) shunt status    3  Peritonitis ([address] )    4  Acute peritonitis ([address] )    5  S/P  shunt    6  Idiopathic intracranial hypertension    7  Mild single current episode of major depressive disorder ([address] )    8  Esophageal anastomotic leak    9  Moderate protein-calorie malnutrition (HCC)      Post-op Diagnosis:   1  Gastric leak    2   (ventriculoperitoneal) shunt status    3  Peritonitis ([address] )    4  Acute peritonitis ([address] )    5  S/P  shunt    6  Idiopathic intracranial hypertension    7  Mild single current episode of major depressive disorder ([address] )    8  Esophageal anastomotic leak    9  Moderate protein-calorie malnutrition ([address] )        Surgeon:   Nuria Colby MD  Assistants:     No qualified resident was available  Estimated Blood Loss: None  Findings: Mid abdominal drain fills amorphous cavity approximately 5 mL volume  Left sided drain fills amorphous cavity approximately 10 mL volume  2 cavities do not communicate and do not drain to the abdominal wound  Specimens: None  Complications:  None  Anesthesia: None      Nuria Colby MD     Date: 3/2/2018  Time: 9:16 AM

## 2018-03-02 NOTE — CASE MANAGEMENT
Thank you,  [facility] in the [facility] by Donald Villa for 2017  Network Utilization Review Department  Phone: [phone]; Fax [phone]  ATTENTION: The Network Utilization Review Department is now centralized for our 7 Facilities  Make a note that we have a new phone and fax numbers for our Department  Please call with any questions or concerns to [phone] and carefully follow the prompts so that you are directed to the right person  All voicemails are confidential  Fax any determinations, approvals, denials, and requests for initial or continue stay review clinical to [phone]  Due to HIGH CALL volume, it would be easier if you could please send faxed requests to expedite your requests and in part, help us provide discharge notifications faster  Continued Stay Review    Date: 3/2/18    Vital Signs: /88 (BP Location: Left arm)   Pulse 93   Temp 99 3 °F (37 4 °C) (Oral)   Resp 18   Ht 5' 4" (1 626 m)   Wt 60 8 kg (134 lb 0 6 oz)   LMP  (LMP Unknown)   SpO2 90%   Breastfeeding?  No   BMI 23 01 kg/m²     Medications:   Scheduled Meds:   Current Facility-Administered Medications:  acetaminophen 975 mg Oral [facility] Hari Hicks PA-C    ampicillin-sulbactam 3 g Intravenous Q6H Michelle Delarosa MD Last Rate: 3 g (03/02/18 1152)   bisacodyl 10 mg Rectal Daily Eduin Michelle MD    fluconazole 400 mg Intravenous Q24H Michelle Delarosa MD Last Rate: 400 mg (03/01/18 1519)   gabapentin 300 mg Oral HS Vi Maya MD    heparin (porcine) 5,000 Units Subcutaneous Q12H [facility] Vi Maya MD    HYDROmorphone 0 5 mg Intravenous Q1H PRN Eduin Michelle MD    insulin lispro 1-5 Units Subcutaneous Q6H [facility] Nayla Mcconnell PA-C    iohexol 50 mL Oral 90 min pre-procedure Kareem Sanches MD    iohexol 50 mL Oral 90 min pre-procedure Kareem Sanches MD    iohexol 50 mL Oral 60 Min Pre-Op Marcia Biundo, PA-C    iohexol 50 mL Intravenous Once in imaging Daniel Low MD    menthol-methyl salicylate  Apply externally 4x Daily PRN Cindy Bradley MD    metoprolol 10 mg Intravenous Q6H Saúl Dennis MD    mirtazapine 15 mg Oral HS Anabelle Long PA-C    nortriptyline 10 mg Oral BID Saúl Dennis MD    ondansetron 4 mg Intravenous Q4H PRN Jatin Ferreira MD    oxyCODONE 10 mg Oral Q4H PRN Ophelia Alfaro PA-C    oxyCODONE 5 mg Oral Q4H PRN Ophelia Alfaro PA-C    pantoprazole 40 mg Intravenous Q24H [address] [place]      Continuous Infusions:    PRN Meds: HYDROmorphone    iohexol    menthol-methyl salicylate    Ondansetron x1 3/2 so far, x5 3/1    oxyCODONE 10 mg x 2 so far 3/2, x 3 3/1    oxyCODONE    Abnormal Labs/Diagnostic Results:    Ref Range & Units 03/02/18 0558   CO2 21 - 32 mmol/L 35     Anion Gap 4 - 13 mmol/L 3     Creatinine 0 60 - 1 30 mg/dL 0 47     Glucose 65 - 140 mg/dL 153        Ref Range & Units 03/02/18 0558   WBC 4 31 - 10 16 Thousand/uL 13 14     RBC 3 81 - 5 12 Million/uL 3 31     Hemoglobin 11 5 - 15 4 g/dL 9 1     Hematocrit 34 8 - 46 1 % 30 5     MCHC 31 4 - 37 4 g/dL 29 8     RDW 11 6 - 15 1 % 15 8     Platelets 639 - 754 Thousands/uL 507     Neutrophils Relative 43 - 75 % 77     Lymphocytes Relative 14 - 44 % 12     Neutrophils Absolute 1 85 - 7 62 Thousands/µL 10 07     Eosinophils Absolute 0 00 - 0 61 Thousand/µL 0 63       POC glucose 186, 170    Age/Sex: 36 y o  female     Assessment/Plan:   3/2 Surgery Progress Note  42F with gastric perforation after hiatal hernia repair, s/p ex-lap and washout, repair of gastric perforation, EGD with stenting, IR drainage, EGD and repeat stenting x2      - Went to IR yesterday for tube check   Drain felt to be in appropriate position     Plan:  - NPO  - continue tube feeds at goal  - JPs to suction, monitor output  - trend wbc - may need to be rescanned if continues to climb  - abx to continue per ID until at least tomorrow 3/2  - OOB/ambulate  - SQH/SCDs  - dispo planning per CM  __________________________  3/2 Thoracic Surgery   36 F with gastric perforation after hiatal hernia, s/p ex-lap washout, EGD and stent placement x2     Plan:  Continue NPO  Continue TF at goal @ 79  ADENIKE to suction  F/u WBC  Antiemetics  Will need long term enteral nutrition to heal leak  ________________________  3/2 IR Op Note   9:16 AM         []Hide copied text  []Hover for attribution information  2 x ABSCESS DRAINAGE TUBE CHECK:  Procedure Note        Findings :    Mid abdominal drain fills amorphous cavity approximately 5 mL volume  Left sided drain fills amorphous cavity approximately 10 mL volume  2 cavities do not communicate and do not drain to the abdominal wound  ____________________________  3/2 ID Progress Note  1   Intra-abdominal/pelvic abscesses   Patient is status post multiple abdominal washouts   She has multiple drains in place   She is status post placement of drainage in a new perisplenic collection   She is clinically well and systemically stable  Operative cultures consistently growing  only ampicillin susceptible Enterococcus   A single culture also grew Saccharomyces, most likely colonization   Patient had a new drain placed in left abdomen on 02/16   Culture this drainage is growing Enterococcus again, with no other pathogens   WBC decreased again with abdominal wound drainage  Continue with IV Unasyn/fluconazole  Continue drainage  Treat x 14 days from last drainage, another 1 day, through tomorrow  Monitor WBC closely      2   Gastric perforation, status post repair, with persistent leak   She is now status post placement of esophageal stent   Barium swallow from today showed persistent leak   Patient is status post esophageal stent exchange with persistent leak   She is now status post a new stent inside initial stent   WBC decreased afterwards but now increased again   Upper GI with persistent leak   This is most likely etiology of increasing WBC    Monitor for recurrent leak      3   Abdominal wound dehiscent with probable subcutaneously abscess, status post spontaneous drainage  There is still significant seropurulent wound drainage today   Will defer to surgery as to whether incision need to be further open to allow more complete drainage  WBC decreased after abdominal wound drainage  No change antibiotic plan above  Wound care per surgery service      4  Persistent leukocytosis   WBC decreased with spontaneous drainage of abdominal wall subcutaneously abscess   This is most likely etiology of persistent leukocytosis  Antibiotic plan as in above  Monitor WBC and temperature closely        5   Possible infected  shunt   Given presence of tip of  shunt in peritoneal space, there is high probability of  shunt infection   Fortunately, patient has no symptoms concerning for meningitis   She has neurological deficits   She is status post tapping of  shunt at [facility]   CSF culture there had no growth but patient had prior antibiotic  Ursula Clayton will go ahead and treat her for possible/presumptive  shunt infection   Patient is now status post  shunt resection   CSF culture here also negative  Obie Willson completed 2 weeks of antibiotic after shunt removal     No further antibiotic needed for this      6   Bilateral pleural effusion, status post chest tube placement   This is most likely sympathetic effusion, secondary to intra-abdominal abscesses   Chest tubes have been removed   Patient remains comfortable    Antibiotics:  Unasyn  Fluconazole # 31  POD # 35  Post/drainage # 13     Discharge Plan: [facility]

## 2018-03-02 NOTE — OCCUPATIONAL THERAPY NOTE
Occupational Therapy Treatment Note      Zoila Ballard    3/2/2018    Patient Active Problem List   Diagnosis    Gastric leak    Acute peritonitis    Idiopathic intracranial hypertension    S/P  shunt     (ventriculoperitoneal) shunt status    Murmur, cardiac    Refusal of blood product    Gastroesophageal reflux disease    Choroidal nevus, right eye    Moderate protein-calorie malnutrition (HCC)    Esophageal anastomotic leak       History reviewed  No pertinent past medical history  Past Surgical History:   Procedure Laterality Date    ESOPHAGOGASTRODUODENOSCOPY N/A 2/23/2018    Procedure: ESOPHAGOGASTRODUODENOSCOPY (EGD) WITH ESOPHAGEAL STENT PLACEMENT;  Surgeon: Frankey Goldberg, MD;  [facility];  Service: Thoracic    ESOPHAGOGASTRODUODENOSCOPY N/A 2/23/2018    Procedure: ESOPHAGOGASTRODUODENOSCOPY (EGD) WITH REMOVAL ESOPHAGEAL STENT  AND REPLACEMENT WITH 23mm X155mm 1111 [address] [place] [address],[address];  Surgeon: Frankey Goldberg, MD;  [facility];  Service: Thoracic    LAPAROTOMY N/A 1/25/2018    Procedure: Exploratory Laparotomy, wash out,placement of drains, placement of NG feeding tube ; Surgeon: Krishna Magallon DO;  [facility];  Service: General    AZ Emelia Cinthya CSF SHUNT,W/O REPLACE Right 2/5/2018    Procedure: Removal of  shunt;  Surgeon: Shahab Randall MD;  [facility];  Service: Neurosurgery    AZ REPLACEMENT/REVISION,CSF SHUNT Right 1/25/2018    Procedure: Externalization of right-sided SHUNT VENTRICULAR-PERITONEAL in anterior chest wall ribs two and three level  ;  Surgeon: Bony Biswas MD;  [facility];  Service: Neurosurgery        03/02/18 1435   Restrictions/Precautions   Weight Bearing Precautions Per Order No   Other Precautions Pain; Fall Risk;Multiple lines   Pain Assessment   Pain Assessment No/denies pain   Pain Score No Pain   ADL   Where Assessed Chair   Grooming Assistance 5  Supervision/Setup   Grooming Deficit Setup; Brushing hair; Increased time to complete   LB Dressing Assistance 5  Supervision/Setup   LB Dressing Deficit Don/doff R sock; Don/doff L sock; Thread RLE into pants; Thread LLE into pants;Setup; Increased time to complete   LB Dressing Comments seated in chair to don B/L socks and hospital pants; Min A to pull up in stance   Toileting Assistance  2  Maximal Assistance   Toileting Deficit Perineal hygiene;Steadying   Toileting Comments "I'm going right now standing"   Functional Standing Tolerance   Time 5 minutes   Activity standing   Comments RW   Bed Mobility   Sit to Supine 4  Minimal assistance   Additional items Assist x 1;HOB elevated; Increased time required;Verbal cues   Transfers   Sit to Stand 3  Moderate assistance   Additional items Assist x 1; Increased time required;Verbal cues;Armrests  (sit>stand attempt x 3)   Stand to Sit 4  Minimal assistance   Additional items Assist x 1; Increased time required;Verbal cues   Functional Mobility   Functional Mobility 4  Minimal assistance   Additional Comments short distance in room w/ RW   Additional items Rolling walker   Therapeutic Exercise - ROM   UE-ROM Yes   Cognition   Overall Cognitive Status WFL   Arousal/Participation Cooperative   Attention Within functional limits   Orientation Level Oriented X4   Memory Within functional limits   Following Commands Follows one step commands without difficulty   Comments pt presents w/ flat affect, agreeable to participate in OT   Activity Tolerance   Activity Tolerance Patient limited by fatigue;Patient limited by pain   Medical Staff Made Aware spoke w/ nsg staff   Assessment   Assessment Pt participated in skilled OT session this date focusing on LB dressing, functional standing tolerance, and functional mobility  Pt agreeable to participate in OT tx session  Upon arrival, pt seated in chair  Following tx session, pt supine in bed, all needs within reach   Pt requiring SBA for LB dressing (socks and pants) seated in chair, Min A to pull up in stance  Pt requiring Mod A for sit>stand transfer, Min A for functional mobility in room w/ Rw  Pt requiring Max A for toileting hygiene  Pt requiring SBA for grooming seated EOB  Pt requesting to participate in UB exercises  Pt completed UB functional reaching tasks seated EOB  Pt requiring Min A to transfer from sit>supine  In comparison to previous sessions, pt w/ improvements in mobility, ADL activity participation  Pt continues to be functioning below baseline level 2* decreased mobility, increased pain, decreased dynamic balance, decreased activity tolerance  From OT standpoint, continue to recommend STR at d/c  Continue to follow pt in hospital for OT treatments to address performance deficits and to maximize safety and independence w/ ADLs and functional mobility  Plan   Treatment Interventions ADL retraining;Functional transfer training; Endurance training;Patient/family training;Equipment evaluation/education; Compensatory technique education; Activityengagement   Goal Expiration Date 03/04/18   Treatment Day 7   OT Frequency 3-5x/wk   Recommendation   OT Discharge Recommendation Short Term Rehab   OT - OK to Discharge Yes  (when medically appropriate)   Barthel Index   Feeding 0  (NPO)   Bathing 0   Grooming Score 0   Dressing Score 5   Bladder Score 10   Bowels Score 10   Toilet Use Score 5   Transfers (Bed/Chair) Score 5   Mobility (Level Surface) Score 0   Stairs Score 0   Barthel Index Score 35   Documentation Completed by Leidy Devine MS, OTR/L

## 2018-03-02 NOTE — PROGRESS NOTES
Patient care rounds were completed with the patient's nurse today, Perla Dimas  We discussed the plan is to continue our current plan of care with her NPO status and continued nasoenteric tube feeding at a goal rate  Continue IR drains for management of her intra-abdominal collections as well as local wound care for the drainage via her abdominal wall  Continue IV antibiotics through tomorrow at least per Infectious Disease  We reviewed all of the invasive devices/lines/telemetry orders   - Right-sided PICC line for continued IV antibiotics and IV access; anticipate discontinuation of the line prior to her discharge  Pain Assessment / Plan:  - Continue current analgesic regimen with improved pain control using the oral analgesic via the feeding tube  Mobility Assessment / Plan:  - Activity as tolerated with continued PT and OT evaluation and treatment as indicated  Goals / Barriers for discharge:  - Continued management of her esophageal leak with drainage, tube feeds, and local wound care to her abdominal wall  - Case management following  All questions and concerns were addressed  I spent greater than 15 minutes reviewing the plan with the patient and the nurse, and coordinating her care for the day      Mal Yip PA-C  3/2/2018 10:37 AM

## 2018-03-02 NOTE — PROGRESS NOTES
Progress Note - Acute Care Surgery   Paresh Armstrong 36 y o  female MRN: [id number]  Unit/Bed#: [facility] 834-01 Encounter: [id number]    Assessment:  42F with gastric perforation after hiatal hernia repair, s/p ex-lap and washout, repair of gastric perforation, EGD with stenting, IR drainage, EGD and repeat stenting x2  Vivica Reading to IR yesterday for tube check  Drain felt to be in appropriate position    Plan:  - NPO  - continue tube feeds at goal  - JPs to suction, monitor output  - trend wbc - may need to be rescanned if continues to climb  - abx to continue per ID until at least tomorrow 3/2  - OOB/ambulate  - SQH/SCDs  - dispo planning per CM    Subjective/Objective     Subjective:   No acute changes  Last BM 3 days ago  Passing gas  Objective:    Blood pressure 150/90, pulse 94, temperature 98 7 °F (37 1 °C), temperature source Oral, resp  rate 18, height 5' 4" (1 626 m), weight 60 8 kg (134 lb 0 6 oz), SpO2 91 %, not currently breastfeeding  ,Body mass index is 23 01 kg/m²  Intake/Output Summary (Last 24 hours) at 03/02/18 0631  Last data filed at 03/02/18 0321   Gross per 24 hour   Intake             2205 ml   Output             2575 ml   Net             -370 ml       Invasive Devices     Peripherally Inserted Central Catheter Line            PICC Line 95/14/50 Right Basilic 40 days          Drain            Closed/Suction Drain Midline;Superior Abdomen Other (Comment) 10 Fr  13 days    Closed/Suction Drain Left;Lateral LUQ Bulb 12 Fr  6 days    NG/OG/Enteral Tube Nasogastric 8 Fr Left nares 6 days                Physical Exam:   NAD  Norm resp effort  RRR  Wound manager over midline   ADENIKE putting out TF color fluid        Results from last 7 days  Lab Units 03/01/18  0456 02/28/18  0515 02/26/18  0503   WBC Thousand/uL 17 27* 24 08* 19 44*   HEMOGLOBIN g/dL 8 7* 9 3* 9 5*   HEMATOCRIT % 28 7* 30 6* 31 9*   PLATELETS Thousands/uL 468* 456* 504*       Results from last 7 days  Lab Units 03/01/18  0456 02/28/18  0515 02/26/18  0503   SODIUM mmol/L 143 143 142   POTASSIUM mmol/L 3 6 3 4* 3 9   CHLORIDE mmol/L 104 102 103   CO2 mmol/L 35* 37* 36*   BUN mg/dL 15 15 14   CREATININE mg/dL 0 43* 0 46* 0 42*   GLUCOSE RANDOM mg/dL 176* 202* 178*   CALCIUM mg/dL 9 8 10 0 10 0

## 2018-03-02 NOTE — PHYSICAL THERAPY NOTE
Physical Therapy Progress Note     03/02/18 1122   Pain Assessment   Pain Assessment 0-10   Pain Score 8   Pain Type Chronic pain   Pain Location Abdomen   Pain Orientation Anterior   Hospital Pain Intervention(s) Repositioned; Emotional support; Ambulation/increased activity   Response to Interventions tolerated well  Restrictions/Precautions   Weight Bearing Precautions Per Order No   Other Precautions Fall Risk;Pain;Multiple lines   General   Chart Reviewed Yes   Family/Caregiver Present No   Cognition   Overall Cognitive Status WFL   Orientation Level Oriented X4   Subjective   Subjective happy with progress   Bed Mobility   Rolling R 4  Minimal assistance   Rolling L 4  Minimal assistance   Supine to Sit 4  Minimal assistance   Transfers   Sit to Stand 3  Moderate assistance   Additional items Increased time required;Verbal cues  (bed elevated)   Stand to Sit 4  Minimal assistance   Additional items Assist x 1; Increased time required;Verbal cues   Toilet transfer 2  Maximal assistance   Additional items Assist x 1; Increased time required;Standard toilet;Verbal cues  (seat too low)   Ambulation/Elevation   Gait pattern WNL; Excessively slow  (no knee buckling)   Gait Assistance 4  Minimal assist   Additional items Assist x 1;Verbal cues   Assistive Device Rolling walker  (chair follow but did not need it)   Distance 150'   Balance   Static Sitting Good   Dynamic Sitting Fair   Static Standing Fair -   Dynamic Standing Poor +   Ambulatory Poor +   Endurance Deficit   Endurance Deficit Yes   Endurance Deficit Description fatigue, anxiety   Activity Tolerance   Activity Tolerance Treatment limited secondary to medical complications (Comment)   Nurse Made Aware yes   Exercises   TKR Sitting;10 reps;AROM; Bilateral  (mild manual resistance)   Assessment   Prognosis Good   Problem List Decreased strength;Decreased endurance; Impaired balance;Decreased mobility; Decreased safety awareness;Decreased skin integrity;Pain Assessment pt demonstrated improved mobility and activity tolerance, pt able to amb 150', perform ther ex, then toilet training  pt did need max assist to rise from toilet  pt continues to have strength deficit ble's with strength in 4-/5 range  pt [facility] and should use bathroom for toileting rather than bedpan  pt needs continued PT and rehab   Barriers to Discharge Inaccessible home environment   Goals   Patient Goals none offered   LTG Expiration Date 03/12/18   Treatment Day 9   Plan   Treatment/Interventions Functional transfer training;LE strengthening/ROM; Elevations; Therapeutic exercise; Endurance training;Patient/family training;Equipment eval/education; Bed mobility;Gait training;Spoke to nursing   Progress Slow progress, medical status limitations   PT Frequency 5x/wk   Recommendation   Recommendation Post acute IP rehab   PT - OK to Discharge Yes  (rehab)   Additional Comments rehab     Hannah Mcdonough, PT

## 2018-03-02 NOTE — PROGRESS NOTES
Progress Note - Thoracic Surgery   Chalo Cano 36 y o  female MRN: [id number]  Unit/Bed#: [facility] 834-01 Encounter: [id number]    Assessment:  36 F with gastric perforation after hiatal hernia, s/p ex-lap washout, EGD and stent placement x2    Plan:  Continue NPO  Continue TF at goal @ 79  ADENIKE to suction  F/u WBC  Antiemetics  Will need long term enteral nutrition to heal leak    Subjective/Objective     Subjective: CECILLE  +flatus, no BM for several days  Objective:    Blood pressure 159/88, pulse 93, temperature 99 3 °F (37 4 °C), temperature source Oral, resp  rate 18, height 5' 4" (1 626 m), weight 60 8 kg (134 lb 0 6 oz), SpO2 90 %, not currently breastfeeding  ,Body mass index is 23 01 kg/m²  Intake/Output Summary (Last 24 hours) at 03/02/18 0806  Last data filed at 03/02/18 0321   Gross per 24 hour   Intake             2205 ml   Output             2575 ml   Net             -370 ml       Invasive Devices     Peripherally Inserted Central Catheter Line            PICC Line 19/83/08 Right Basilic 40 days          Drain            Closed/Suction Drain Midline;Superior Abdomen Other (Comment) 10 Fr  13 days    Closed/Suction Drain Left;Lateral LUQ Bulb 12 Fr  6 days    NG/OG/Enteral Tube Nasogastric 8 Fr Left nares 6 days                Physical Exam:   NAD  AAOX3  Normal respiratory effort  Midline with wound manager   ADENIKE with tan color output        Results from last 7 days  Lab Units 03/02/18  0558 03/01/18  0456 02/28/18  0515   WBC Thousand/uL 13 14* 17 27* 24 08*   HEMOGLOBIN g/dL 9 1* 8 7* 9 3*   HEMATOCRIT % 30 5* 28 7* 30 6*   PLATELETS Thousands/uL 507* 468* 456*       Results from last 7 days  Lab Units 03/02/18  0558 03/01/18  0456 02/28/18  0515   SODIUM mmol/L 142 143 143   POTASSIUM mmol/L 3 6 3 6 3 4*   CHLORIDE mmol/L 104 104 102   CO2 mmol/L 35* 35* 37*   BUN mg/dL 15 15 15   CREATININE mg/dL 0 47* 0 43* 0 46*   GLUCOSE RANDOM mg/dL 153* 176* 202*   CALCIUM mg/dL 9 9 9 8 10 0

## 2018-03-03 LAB
GLUCOSE SERPL-MCNC: 198 MG/DL (ref 65–140)
GLUCOSE SERPL-MCNC: 208 MG/DL (ref 65–140)
GLUCOSE SERPL-MCNC: 209 MG/DL (ref 65–140)
GLUCOSE SERPL-MCNC: 210 MG/DL (ref 65–140)

## 2018-03-03 PROCEDURE — C9113 INJ PANTOPRAZOLE SODIUM, VIA: HCPCS | Performed by: NURSE PRACTITIONER

## 2018-03-03 PROCEDURE — 82948 REAGENT STRIP/BLOOD GLUCOSE: CPT

## 2018-03-03 PROCEDURE — 99024 POSTOP FOLLOW-UP VISIT: CPT | Performed by: SURGERY

## 2018-03-03 RX ADMIN — OXYCODONE HYDROCHLORIDE 10 MG: 5 SOLUTION ORAL at 21:58

## 2018-03-03 RX ADMIN — INSULIN LISPRO 1 UNITS: 100 INJECTION, SOLUTION INTRAVENOUS; SUBCUTANEOUS at 06:57

## 2018-03-03 RX ADMIN — OXYCODONE HYDROCHLORIDE 10 MG: 5 SOLUTION ORAL at 08:18

## 2018-03-03 RX ADMIN — INSULIN LISPRO 1 UNITS: 100 INJECTION, SOLUTION INTRAVENOUS; SUBCUTANEOUS at 18:44

## 2018-03-03 RX ADMIN — INSULIN LISPRO 2 UNITS: 100 INJECTION, SOLUTION INTRAVENOUS; SUBCUTANEOUS at 12:06

## 2018-03-03 RX ADMIN — OXYCODONE HYDROCHLORIDE 10 MG: 5 SOLUTION ORAL at 02:42

## 2018-03-03 RX ADMIN — METOPROLOL TARTRATE 10 MG: 1 INJECTION, SOLUTION INTRAVENOUS at 00:03

## 2018-03-03 RX ADMIN — ONDANSETRON 4 MG: 2 INJECTION INTRAMUSCULAR; INTRAVENOUS at 02:45

## 2018-03-03 RX ADMIN — Medication 3 G: at 12:06

## 2018-03-03 RX ADMIN — PANTOPRAZOLE SODIUM 40 MG: 40 INJECTION, POWDER, FOR SOLUTION INTRAVENOUS at 08:18

## 2018-03-03 RX ADMIN — ONDANSETRON 4 MG: 2 INJECTION INTRAMUSCULAR; INTRAVENOUS at 13:06

## 2018-03-03 RX ADMIN — HEPARIN SODIUM 5000 UNITS: 5000 INJECTION, SOLUTION INTRAVENOUS; SUBCUTANEOUS at 22:26

## 2018-03-03 RX ADMIN — Medication 3 G: at 00:03

## 2018-03-03 RX ADMIN — ONDANSETRON 4 MG: 2 INJECTION INTRAMUSCULAR; INTRAVENOUS at 21:58

## 2018-03-03 RX ADMIN — METOPROLOL TARTRATE 10 MG: 1 INJECTION, SOLUTION INTRAVENOUS at 17:38

## 2018-03-03 RX ADMIN — OXYCODONE HYDROCHLORIDE 10 MG: 5 SOLUTION ORAL at 14:09

## 2018-03-03 RX ADMIN — HEPARIN SODIUM 5000 UNITS: 5000 INJECTION, SOLUTION INTRAVENOUS; SUBCUTANEOUS at 08:18

## 2018-03-03 RX ADMIN — Medication 3 G: at 05:12

## 2018-03-03 RX ADMIN — INSULIN LISPRO 1 UNITS: 100 INJECTION, SOLUTION INTRAVENOUS; SUBCUTANEOUS at 00:02

## 2018-03-03 RX ADMIN — ONDANSETRON 4 MG: 2 INJECTION INTRAMUSCULAR; INTRAVENOUS at 07:04

## 2018-03-03 RX ADMIN — METOPROLOL TARTRATE 10 MG: 1 INJECTION, SOLUTION INTRAVENOUS at 12:06

## 2018-03-03 RX ADMIN — METOPROLOL TARTRATE 10 MG: 1 INJECTION, SOLUTION INTRAVENOUS at 05:12

## 2018-03-03 RX ADMIN — ONDANSETRON 4 MG: 2 INJECTION INTRAMUSCULAR; INTRAVENOUS at 17:37

## 2018-03-03 RX ADMIN — Medication 3 G: at 18:19

## 2018-03-03 RX ADMIN — FLUCONAZOLE 400 MG: 2 INJECTION INTRAVENOUS at 16:07

## 2018-03-03 NOTE — PROGRESS NOTES
Progress Note - Thoracic Surgery   Daya Cueto 36 y o  female MRN: [id number]  Unit/Bed#: [facility] 834-01 Encounter: [id number]    Assessment:  36 F with gastric perforation after hiatal hernia, s/p ex-lap washout, EGD and stent placement x2    Plan:  - NPO, ice chips for comfort   - TF to goal  - ADENIKE/drain to suction  - prn pain control or antiemetic  - abx per ID, to end today  - SQH/SCDs      Subjective/Objective     Subjective: Had BM yesterday, tolerating TF    Objective:    Blood pressure 141/86, pulse 100, temperature 98 7 °F (37 1 °C), temperature source Oral, resp  rate 18, height 5' 4" (1 626 m), weight 60 8 kg (134 lb 0 6 oz), SpO2 94 %, not currently breastfeeding  ,Body mass index is 23 01 kg/m²        Intake/Output Summary (Last 24 hours) at 03/03/18 0857  Last data filed at 03/03/18 0746   Gross per 24 hour   Intake             1345 ml   Output             2265 ml   Net             -920 ml       Invasive Devices     Peripherally Inserted Central Catheter Line            PICC Line 51/75/21 Right Basilic 41 days          Drain            Closed/Suction Drain Midline;Superior Abdomen Other (Comment) 10 Fr  14 days    Closed/Suction Drain Left;Lateral LUQ Bulb 12 Fr  7 days    NG/OG/Enteral Tube Nasogastric 8 Fr Left nares 7 days                Physical Exam:   NAD  Norm resp effort  RRR  Abd soft, midline drain serosang, L flank drain seropurulent, midline wound manager lightly purulent, mild distended, NT        Results from last 7 days  Lab Units 03/02/18  0558 03/01/18 0456 02/28/18  0515   WBC Thousand/uL 13 14* 17 27* 24 08*   HEMOGLOBIN g/dL 9 1* 8 7* 9 3*   HEMATOCRIT % 30 5* 28 7* 30 6*   PLATELETS Thousands/uL 507* 468* 456*       Results from last 7 days  Lab Units 03/02/18  0558 03/01/18  0456 02/28/18  0515   SODIUM mmol/L 142 143 143   POTASSIUM mmol/L 3 6 3 6 3 4*   CHLORIDE mmol/L 104 104 102   CO2 mmol/L 35* 35* 37*   BUN mg/dL 15 15 15   CREATININE mg/dL 0 47* 0 43* 0 46*   GLUCOSE RANDOM mg/dL 153* 176* 202*   CALCIUM mg/dL 9 9 9 8 10 0

## 2018-03-04 LAB
ANION GAP SERPL CALCULATED.3IONS-SCNC: 2 MMOL/L (ref 4–13)
BASOPHILS # BLD AUTO: 0.04 THOUSANDS/ΜL (ref 0–0.1)
BASOPHILS NFR BLD AUTO: 0 % (ref 0–1)
BUN SERPL-MCNC: 17 MG/DL (ref 5–25)
CALCIUM SERPL-MCNC: 9.1 MG/DL (ref 8.3–10.1)
CHLORIDE SERPL-SCNC: 104 MMOL/L (ref 100–108)
CO2 SERPL-SCNC: 36 MMOL/L (ref 21–32)
CREAT SERPL-MCNC: 0.44 MG/DL (ref 0.6–1.3)
EOSINOPHIL # BLD AUTO: 0.46 THOUSAND/ΜL (ref 0–0.61)
EOSINOPHIL NFR BLD AUTO: 3 % (ref 0–6)
ERYTHROCYTE [DISTWIDTH] IN BLOOD BY AUTOMATED COUNT: 16.2 % (ref 11.6–15.1)
GFR SERPL CREATININE-BSD FRML MDRD: 124 ML/MIN/1.73SQ M
GLUCOSE SERPL-MCNC: 182 MG/DL (ref 65–140)
GLUCOSE SERPL-MCNC: 186 MG/DL (ref 65–140)
GLUCOSE SERPL-MCNC: 192 MG/DL (ref 65–140)
GLUCOSE SERPL-MCNC: 193 MG/DL (ref 65–140)
GLUCOSE SERPL-MCNC: 217 MG/DL (ref 65–140)
HCT VFR BLD AUTO: 30.1 % (ref 34.8–46.1)
HGB BLD-MCNC: 9.1 G/DL (ref 11.5–15.4)
LYMPHOCYTES # BLD AUTO: 1.37 THOUSANDS/ΜL (ref 0.6–4.47)
LYMPHOCYTES NFR BLD AUTO: 8 % (ref 14–44)
MCH RBC QN AUTO: 27.6 PG (ref 26.8–34.3)
MCHC RBC AUTO-ENTMCNC: 30.2 G/DL (ref 31.4–37.4)
MCV RBC AUTO: 91 FL (ref 82–98)
MONOCYTES # BLD AUTO: 1.16 THOUSAND/ΜL (ref 0.17–1.22)
MONOCYTES NFR BLD AUTO: 7 % (ref 4–12)
NEUTROPHILS # BLD AUTO: 13.9 THOUSANDS/ΜL (ref 1.85–7.62)
NEUTS SEG NFR BLD AUTO: 82 % (ref 43–75)
NRBC BLD AUTO-RTO: 0 /100 WBCS
PLATELET # BLD AUTO: 451 THOUSANDS/UL (ref 149–390)
PMV BLD AUTO: 9.6 FL (ref 8.9–12.7)
POTASSIUM SERPL-SCNC: 3.5 MMOL/L (ref 3.5–5.3)
RBC # BLD AUTO: 3.3 MILLION/UL (ref 3.81–5.12)
SODIUM SERPL-SCNC: 142 MMOL/L (ref 136–145)
WBC # BLD AUTO: 17.02 THOUSAND/UL (ref 4.31–10.16)

## 2018-03-04 PROCEDURE — C9113 INJ PANTOPRAZOLE SODIUM, VIA: HCPCS | Performed by: NURSE PRACTITIONER

## 2018-03-04 PROCEDURE — 82948 REAGENT STRIP/BLOOD GLUCOSE: CPT

## 2018-03-04 PROCEDURE — 80048 BASIC METABOLIC PNL TOTAL CA: CPT | Performed by: STUDENT IN AN ORGANIZED HEALTH CARE EDUCATION/TRAINING PROGRAM

## 2018-03-04 PROCEDURE — 99024 POSTOP FOLLOW-UP VISIT: CPT | Performed by: SURGERY

## 2018-03-04 PROCEDURE — 85025 COMPLETE CBC W/AUTO DIFF WBC: CPT | Performed by: STUDENT IN AN ORGANIZED HEALTH CARE EDUCATION/TRAINING PROGRAM

## 2018-03-04 RX ADMIN — ONDANSETRON 4 MG: 2 INJECTION INTRAMUSCULAR; INTRAVENOUS at 02:42

## 2018-03-04 RX ADMIN — PANTOPRAZOLE SODIUM 40 MG: 40 INJECTION, POWDER, FOR SOLUTION INTRAVENOUS at 08:31

## 2018-03-04 RX ADMIN — INSULIN LISPRO 1 UNITS: 100 INJECTION, SOLUTION INTRAVENOUS; SUBCUTANEOUS at 17:52

## 2018-03-04 RX ADMIN — POTASSIUM CHLORIDE 20 MEQ: 2 INJECTION, SOLUTION, CONCENTRATE INTRAVENOUS at 11:19

## 2018-03-04 RX ADMIN — METOPROLOL TARTRATE 10 MG: 1 INJECTION, SOLUTION INTRAVENOUS at 17:52

## 2018-03-04 RX ADMIN — ONDANSETRON 4 MG: 2 INJECTION INTRAMUSCULAR; INTRAVENOUS at 17:52

## 2018-03-04 RX ADMIN — INSULIN LISPRO 2 UNITS: 100 INJECTION, SOLUTION INTRAVENOUS; SUBCUTANEOUS at 00:43

## 2018-03-04 RX ADMIN — OXYCODONE HYDROCHLORIDE 10 MG: 5 SOLUTION ORAL at 08:31

## 2018-03-04 RX ADMIN — OXYCODONE HYDROCHLORIDE 10 MG: 5 SOLUTION ORAL at 17:59

## 2018-03-04 RX ADMIN — INSULIN LISPRO 2 UNITS: 100 INJECTION, SOLUTION INTRAVENOUS; SUBCUTANEOUS at 11:20

## 2018-03-04 RX ADMIN — HEPARIN SODIUM 5000 UNITS: 5000 INJECTION, SOLUTION INTRAVENOUS; SUBCUTANEOUS at 08:31

## 2018-03-04 RX ADMIN — ONDANSETRON 4 MG: 2 INJECTION INTRAMUSCULAR; INTRAVENOUS at 13:42

## 2018-03-04 RX ADMIN — HEPARIN SODIUM 5000 UNITS: 5000 INJECTION, SOLUTION INTRAVENOUS; SUBCUTANEOUS at 21:56

## 2018-03-04 RX ADMIN — METOPROLOL TARTRATE 10 MG: 1 INJECTION, SOLUTION INTRAVENOUS at 11:19

## 2018-03-04 RX ADMIN — INSULIN LISPRO 1 UNITS: 100 INJECTION, SOLUTION INTRAVENOUS; SUBCUTANEOUS at 05:06

## 2018-03-04 RX ADMIN — ONDANSETRON 4 MG: 2 INJECTION INTRAMUSCULAR; INTRAVENOUS at 08:37

## 2018-03-04 RX ADMIN — ONDANSETRON 4 MG: 2 INJECTION INTRAMUSCULAR; INTRAVENOUS at 21:51

## 2018-03-04 RX ADMIN — OXYCODONE HYDROCHLORIDE 10 MG: 5 SOLUTION ORAL at 22:05

## 2018-03-04 RX ADMIN — OXYCODONE HYDROCHLORIDE 10 MG: 5 SOLUTION ORAL at 13:42

## 2018-03-04 RX ADMIN — OXYCODONE HYDROCHLORIDE 10 MG: 5 SOLUTION ORAL at 02:42

## 2018-03-04 RX ADMIN — METOPROLOL TARTRATE 10 MG: 1 INJECTION, SOLUTION INTRAVENOUS at 00:43

## 2018-03-04 RX ADMIN — METOPROLOL TARTRATE 10 MG: 1 INJECTION, SOLUTION INTRAVENOUS at 05:06

## 2018-03-04 NOTE — PROGRESS NOTES
Progress Note - General Surgery   Nissa Parker 36 y o  female MRN: [id number]  Unit/Bed#: [facility] 834-01 Encounter: [id number]    Assessment:  36 F with gastric perforation after hiatal hernia, s/p ex-lap washout, EGD and stent placement x2    Plan:  NPO/ice chips  TF @ 70  Prn pain and nausea control  JPs to suction  Midline wound manger  IS/OOB/ambulate  SQH      Subjective/Objective     Subjective: CECILLE  Pain controlled  Continued nausea, no emesis  +BF  OOB    Objective:    Blood pressure 143/81, pulse 103, temperature 99 °F (37 2 °C), temperature source Oral, resp  rate 17, height 5' 4" (1 626 m), weight 60 8 kg (134 lb 0 6 oz), SpO2 95 %, not currently breastfeeding  ,Body mass index is 23 01 kg/m²        Intake/Output Summary (Last 24 hours) at 03/04/18 0542  Last data filed at 03/04/18 0501   Gross per 24 hour   Intake             1154 ml   Output             1405 ml   Net             -251 ml       Invasive Devices     Peripherally Inserted Central Catheter Line            PICC Line 07/63/82 Right Basilic 42 days          Drain            Closed/Suction Drain Midline;Superior Abdomen Other (Comment) 10 Fr  15 days    Closed/Suction Drain Left;Lateral LUQ Bulb 12 Fr  8 days    NG/OG/Enteral Tube Nasogastric 8 Fr Left nares 8 days                Physical Exam:   AAOx3  NAD  Normal respiratory effort  Soft, TTP over incison, ND  Incision with midline wound manger with milky white output, steri strips over top of incision  ADENIKE midline and left flank light green/white        Results from last 7 days  Lab Units 03/04/18  0432 03/02/18  0558 03/01/18  0456   WBC Thousand/uL 17 02* 13 14* 17 27*   HEMOGLOBIN g/dL 9 1* 9 1* 8 7*   HEMATOCRIT % 30 1* 30 5* 28 7*   PLATELETS Thousands/uL 451* 507* 468*       Results from last 7 days  Lab Units 03/04/18  0432 03/02/18  0558 03/01/18  0456   SODIUM mmol/L 142 142 143   POTASSIUM mmol/L 3 5 3 6 3 6   CHLORIDE mmol/L 104 104 104   CO2 mmol/L 36* 35* 35*   BUN mg/dL 17 15 15 CREATININE mg/dL 0 44* 0 47* 0 43*   GLUCOSE RANDOM mg/dL 186* 153* 176*   CALCIUM mg/dL 9 1 9 9 9 8

## 2018-03-05 PROBLEM — T81.321A ABDOMINAL WOUND DEHISCENCE: Status: ACTIVE | Noted: 2018-03-05

## 2018-03-05 PROBLEM — D72.828 NEUTROPHILIC LEUKOCYTOSIS: Status: ACTIVE | Noted: 2018-03-05

## 2018-03-05 PROBLEM — K65.1 POSTOPERATIVE INTRA-ABDOMINAL ABSCESS (HCC): Status: ACTIVE | Noted: 2018-03-05

## 2018-03-05 PROBLEM — T81.43XA POSTOPERATIVE INTRA-ABDOMINAL ABSCESS: Status: ACTIVE | Noted: 2018-03-05

## 2018-03-05 PROBLEM — T81.30XA ABDOMINAL WOUND DEHISCENCE: Status: ACTIVE | Noted: 2018-03-05

## 2018-03-05 PROBLEM — D72.9 NEUTROPHILIC LEUKOCYTOSIS: Status: ACTIVE | Noted: 2018-03-05

## 2018-03-05 PROBLEM — T85.730D: Status: ACTIVE | Noted: 2018-01-24

## 2018-03-05 LAB
ANION GAP SERPL CALCULATED.3IONS-SCNC: 4 MMOL/L (ref 4–13)
BASOPHILS # BLD AUTO: 0.04 THOUSANDS/ΜL (ref 0–0.1)
BASOPHILS NFR BLD AUTO: 0 % (ref 0–1)
BUN SERPL-MCNC: 17 MG/DL (ref 5–25)
CALCIUM SERPL-MCNC: 9.3 MG/DL (ref 8.3–10.1)
CHLORIDE SERPL-SCNC: 104 MMOL/L (ref 100–108)
CO2 SERPL-SCNC: 36 MMOL/L (ref 21–32)
CREAT SERPL-MCNC: 0.47 MG/DL (ref 0.6–1.3)
EOSINOPHIL # BLD AUTO: 0.42 THOUSAND/ΜL (ref 0–0.61)
EOSINOPHIL NFR BLD AUTO: 3 % (ref 0–6)
ERYTHROCYTE [DISTWIDTH] IN BLOOD BY AUTOMATED COUNT: 16.3 % (ref 11.6–15.1)
GFR SERPL CREATININE-BSD FRML MDRD: 121 ML/MIN/1.73SQ M
GLUCOSE SERPL-MCNC: 153 MG/DL (ref 65–140)
GLUCOSE SERPL-MCNC: 155 MG/DL (ref 65–140)
GLUCOSE SERPL-MCNC: 169 MG/DL (ref 65–140)
GLUCOSE SERPL-MCNC: 172 MG/DL (ref 65–140)
GLUCOSE SERPL-MCNC: 181 MG/DL (ref 65–140)
GLUCOSE SERPL-MCNC: 183 MG/DL (ref 65–140)
HCT VFR BLD AUTO: 30.3 % (ref 34.8–46.1)
HGB BLD-MCNC: 9 G/DL (ref 11.5–15.4)
LYMPHOCYTES # BLD AUTO: 1.44 THOUSANDS/ΜL (ref 0.6–4.47)
LYMPHOCYTES NFR BLD AUTO: 9 % (ref 14–44)
MCH RBC QN AUTO: 27.3 PG (ref 26.8–34.3)
MCHC RBC AUTO-ENTMCNC: 29.7 G/DL (ref 31.4–37.4)
MCV RBC AUTO: 92 FL (ref 82–98)
MONOCYTES # BLD AUTO: 1.03 THOUSAND/ΜL (ref 0.17–1.22)
MONOCYTES NFR BLD AUTO: 6 % (ref 4–12)
NEUTROPHILS # BLD AUTO: 12.99 THOUSANDS/ΜL (ref 1.85–7.62)
NEUTS SEG NFR BLD AUTO: 82 % (ref 43–75)
NRBC BLD AUTO-RTO: 0 /100 WBCS
PLATELET # BLD AUTO: 448 THOUSANDS/UL (ref 149–390)
PMV BLD AUTO: 9.5 FL (ref 8.9–12.7)
POTASSIUM SERPL-SCNC: 3.7 MMOL/L (ref 3.5–5.3)
PREALB SERPL-MCNC: 15.3 MG/DL (ref 18–40)
RBC # BLD AUTO: 3.3 MILLION/UL (ref 3.81–5.12)
SODIUM SERPL-SCNC: 144 MMOL/L (ref 136–145)
WBC # BLD AUTO: 16.03 THOUSAND/UL (ref 4.31–10.16)

## 2018-03-05 PROCEDURE — 99233 SBSQ HOSP IP/OBS HIGH 50: CPT | Performed by: INTERNAL MEDICINE

## 2018-03-05 PROCEDURE — G8978 MOBILITY CURRENT STATUS: HCPCS

## 2018-03-05 PROCEDURE — 82948 REAGENT STRIP/BLOOD GLUCOSE: CPT

## 2018-03-05 PROCEDURE — C9113 INJ PANTOPRAZOLE SODIUM, VIA: HCPCS | Performed by: NURSE PRACTITIONER

## 2018-03-05 PROCEDURE — 85025 COMPLETE CBC W/AUTO DIFF WBC: CPT | Performed by: SURGERY

## 2018-03-05 PROCEDURE — 84134 ASSAY OF PREALBUMIN: CPT | Performed by: SURGERY

## 2018-03-05 PROCEDURE — 99024 POSTOP FOLLOW-UP VISIT: CPT | Performed by: SURGERY

## 2018-03-05 PROCEDURE — 97164 PT RE-EVAL EST PLAN CARE: CPT

## 2018-03-05 PROCEDURE — 80048 BASIC METABOLIC PNL TOTAL CA: CPT | Performed by: SURGERY

## 2018-03-05 PROCEDURE — 97116 GAIT TRAINING THERAPY: CPT

## 2018-03-05 PROCEDURE — G8979 MOBILITY GOAL STATUS: HCPCS

## 2018-03-05 PROCEDURE — 97110 THERAPEUTIC EXERCISES: CPT

## 2018-03-05 RX ADMIN — METOPROLOL TARTRATE 10 MG: 1 INJECTION, SOLUTION INTRAVENOUS at 12:05

## 2018-03-05 RX ADMIN — INSULIN LISPRO 1 UNITS: 100 INJECTION, SOLUTION INTRAVENOUS; SUBCUTANEOUS at 11:57

## 2018-03-05 RX ADMIN — INSULIN LISPRO 1 UNITS: 100 INJECTION, SOLUTION INTRAVENOUS; SUBCUTANEOUS at 01:10

## 2018-03-05 RX ADMIN — INSULIN LISPRO 1 UNITS: 100 INJECTION, SOLUTION INTRAVENOUS; SUBCUTANEOUS at 06:04

## 2018-03-05 RX ADMIN — PANTOPRAZOLE SODIUM 40 MG: 40 INJECTION, POWDER, FOR SOLUTION INTRAVENOUS at 08:17

## 2018-03-05 RX ADMIN — METOPROLOL TARTRATE 10 MG: 1 INJECTION, SOLUTION INTRAVENOUS at 01:10

## 2018-03-05 RX ADMIN — ONDANSETRON 4 MG: 2 INJECTION INTRAMUSCULAR; INTRAVENOUS at 02:11

## 2018-03-05 RX ADMIN — OXYCODONE HYDROCHLORIDE 10 MG: 5 SOLUTION ORAL at 14:55

## 2018-03-05 RX ADMIN — ONDANSETRON 4 MG: 2 INJECTION INTRAMUSCULAR; INTRAVENOUS at 19:47

## 2018-03-05 RX ADMIN — HEPARIN SODIUM 5000 UNITS: 5000 INJECTION, SOLUTION INTRAVENOUS; SUBCUTANEOUS at 21:41

## 2018-03-05 RX ADMIN — OXYCODONE HYDROCHLORIDE 10 MG: 5 SOLUTION ORAL at 06:11

## 2018-03-05 RX ADMIN — OXYCODONE HYDROCHLORIDE 10 MG: 5 SOLUTION ORAL at 19:47

## 2018-03-05 RX ADMIN — OXYCODONE HYDROCHLORIDE 10 MG: 5 SOLUTION ORAL at 02:11

## 2018-03-05 RX ADMIN — ONDANSETRON 4 MG: 2 INJECTION INTRAMUSCULAR; INTRAVENOUS at 10:35

## 2018-03-05 RX ADMIN — HEPARIN SODIUM 5000 UNITS: 5000 INJECTION, SOLUTION INTRAVENOUS; SUBCUTANEOUS at 08:16

## 2018-03-05 RX ADMIN — METOPROLOL TARTRATE 10 MG: 1 INJECTION, SOLUTION INTRAVENOUS at 18:18

## 2018-03-05 RX ADMIN — OXYCODONE HYDROCHLORIDE 10 MG: 5 SOLUTION ORAL at 10:32

## 2018-03-05 RX ADMIN — METOPROLOL TARTRATE 10 MG: 1 INJECTION, SOLUTION INTRAVENOUS at 06:04

## 2018-03-05 RX ADMIN — ONDANSETRON 4 MG: 2 INJECTION INTRAMUSCULAR; INTRAVENOUS at 06:04

## 2018-03-05 RX ADMIN — INSULIN LISPRO 1 UNITS: 100 INJECTION, SOLUTION INTRAVENOUS; SUBCUTANEOUS at 18:18

## 2018-03-05 RX ADMIN — ONDANSETRON 4 MG: 2 INJECTION INTRAMUSCULAR; INTRAVENOUS at 14:55

## 2018-03-05 NOTE — PROGRESS NOTES
Progress Note - Infectious Disease   AIDEE Stein Au 36 y o  female MRN: [id number]  Unit/Bed#: [facility] 834-01 Encounter: [id number]      Impression/Recommendations:  Postoperative intra-abdominal abscess [facility])   Assessment & Plan    Due to esophageal anastomotic leak  Status post multiple drains  Cultures with Enterococcus/Saccaromyces  Status post IV antibiotic course  Remains stable off antibiotics    · Continue to follow closely off antibiotics  · Continue drains per surgery  · Follow temperatures closely  · Check CBC in a m  Esophageal anastomotic leak   Assessment & Plan    Following repair of iatrogenic gastric perforation  Status post stent exchange/replacement  Recent upper GI shows persistent leak    · Patient NPO per surgery  · Possible repeat swallow study this week per surgery        Abdominal wound dehiscence   Assessment & Plan    Likely due to subcutaneous abscess  Continues to have significant drainage  Recent tube check shows no evidence of fistulous connection with abscess    · Continue with local wound care as per surgery  · Follow wound drainage closely        Infection of  (ventriculoperitoneal) shunt, subsequent encounter   Assessment & Plan    In the setting of peritonitis  CSF cultures negative and no clinical evidence of meningitis  Status post  shunt resection in 2 weeks IV antibiotics    · Continue to follow closely off antibiotics  · Follow mental status closely        Neutrophilic leukocytosis   Assessment & Plan    Likely multifactorial, largely due to above abscesses  Clinically stable without fever  Remains stable after discontinuation of antibiotics/antifungals    · Continue to follow closely off antibiotics  · Follow temperatures closely  · Check CBC in a m          * Gastric leak   Assessment & Plan    Iatrogenic in setting of recent hiatal hernia repair  Status post repair          Antibiotics:  Off antibiotics #2  POD # 38  Post/drainage # 16    Subjective:  Patient seen on AM rounds  Denies fevers, chills, or sweats  Denies nausea, vomiting, or diarrhea  24 Hour Events:  Wound manager applied to midline abdominal wound over weekend  Copious amount of milky purulent drainage noted in bag    Objective:  Vitals:  HR:  [] 88  Resp:  [16-18] 16  BP: (130-153)/(65-84) 131/65  SpO2:  [94 %-96 %] 96 %  Temp (24hrs), Av °F (37 2 °C), Min:98 2 °F (36 8 °C), Max:99 9 °F (37 7 °C)  Current: Temperature: 98 2 °F (36 8 °C)    Physical Exam:   General:  No acute distress  Eyes:  Normal lids and conjunctivae  ENT:  Normal external ears and nose  Neck:  Neck symmetric with midline trachea  Pulmonary:  Normal respiratory effort without accessory muscle use  Cardiovascular:  Regular rate and rhythm; no peripheral edema  Abdomen:  Soft, distended, milky beige fluid noted in ADENIKE drains and midline wound manager bag  Musculoskeletal:  No digital clubbing or cyanosis  Skin:  No visible rashes; No palpable nodules  Neurologic:  Sensation grossly intact to light touch  Psychiatric:  Alert and oriented; Normal mood    Lab Results:  I have personally reviewed pertinent labs  Results from last 7 days  Lab Units 18  0434 18  0432 18  0558   SODIUM mmol/L 144 142 142   POTASSIUM mmol/L 3 7 3 5 3 6   CHLORIDE mmol/L 104 104 104   CO2 mmol/L 36* 36* 35*   ANION GAP mmol/L 4 2* 3*   BUN mg/dL 17 17 15   CREATININE mg/dL 0 47* 0 44* 0 47*   EGFR ml/min/1 73sq m 121 124 121   GLUCOSE RANDOM mg/dL 181* 186* 153*   CALCIUM mg/dL 9 3 9 1 9 9       Results from last 7 days  Lab Units 18  0434 18  0432 18  0558   WBC Thousand/uL 16 03* 17 02* 13 14*   HEMOGLOBIN g/dL 9 0* 9 1* 9 1*   PLATELETS Thousands/uL 448* 451* 507*           Imaging Studies:   I have personally reviewed pertinent imaging study reports and images in PACS    IR tube check 3/2 no communication of abscess cavity with abdominal wound    EKG, Pathology, and Other Studies:   I have personally reviewed pertinent reports

## 2018-03-05 NOTE — ASSESSMENT & PLAN NOTE
Following repair of iatrogenic gastric perforation  Status post stent exchange/replacement  Recent upper GI shows persistent leak    · Patient NPO per surgery  · Per surgery may take several weeks for complete resolution  Repeat imaging on hold as won't change immediate management

## 2018-03-05 NOTE — PROGRESS NOTES
Progress Note - General Surgery   Bertell Gilford 36 y o  female MRN: [id number]  Unit/Bed#: [facility] 834-01 Encounter: [id number]    Assessment:  36 F with gastric perforation after hiatal hernia, s/p ex-lap washout, EGD and stent placement x2    Plan: Will discuss possible repeat swallow study today  NPO/ice chips  TF @ 70  Prn pain and nausea control  JPs to suction  Midline wound manger  IS/OOB/ambulate  SQH    Subjective/Objective       Subjective:   No acute events  Continues with intermittent pain and nausea  Having bowel function  Objective:    Blood pressure 130/78, pulse 94, temperature 98 8 °F (37 1 °C), temperature source Oral, resp  rate 18, height 5' 4" (1 626 m), weight 60 8 kg (134 lb), SpO2 94 %, not currently breastfeeding  ,Body mass index is 23 kg/m²  Intake/Output Summary (Last 24 hours) at 03/05/18 6042  Last data filed at 03/05/18 0601   Gross per 24 hour   Intake              848 ml   Output             2810 ml   Net            -1962 ml       Invasive Devices     Peripherally Inserted Central Catheter Line            PICC Line 12/99/80 Right Basilic 43 days          Drain            Closed/Suction Drain Midline;Superior Abdomen Other (Comment) 10 Fr  16 days    Closed/Suction Drain Left;Lateral LUQ Bulb 12 Fr  9 days    NG/OG/Enteral Tube Nasogastric 8 Fr Left nares 9 days    Open Drain Midline Abdomen less than 1 day                Physical Exam:   Gen: NAD, AAOx3  CV: RRR  Pulm: no resp distress  Abd: Soft, non-distended, minimally tender   Incision with midline wound manger with milky white output  ADENIKE midline and left flank light green/white        Results from last 7 days  Lab Units 03/05/18 0434 03/04/18  0432 03/02/18  0558   WBC Thousand/uL 16 03* 17 02* 13 14*   HEMOGLOBIN g/dL 9 0* 9 1* 9 1*   HEMATOCRIT % 30 3* 30 1* 30 5*   PLATELETS Thousands/uL 448* 451* 507*       Results from last 7 days  Lab Units 03/05/18  0434 03/04/18  0432 03/02/18  0558   SODIUM mmol/L 144 142 142 POTASSIUM mmol/L 3 7 3 5 3 6   CHLORIDE mmol/L 104 104 104   CO2 mmol/L 36* 36* 35*   BUN mg/dL 17 17 15   CREATININE mg/dL 0 47* 0 44* 0 47*   GLUCOSE RANDOM mg/dL 181* 186* 153*   CALCIUM mg/dL 9 3 9 1 9 9

## 2018-03-05 NOTE — PLAN OF CARE
Problem: PHYSICAL THERAPY ADULT  Goal: Performs mobility at highest level of function for planned discharge setting  See evaluation for individualized goals  Treatment/Interventions: Functional transfer training, LE strengthening/ROM, Therapeutic exercise, Endurance training, Bed mobility, Spoke to nursing  Equipment Recommended:  (R/O LEAST RESTRICTIVE AD )       See flowsheet documentation for full assessment, interventions and recommendations  Outcome: Progressing  Prognosis: Good  Problem List: Decreased strength, Decreased endurance, Impaired balance, Decreased mobility, Pain  Assessment: PT INITIATED TREATMENT SESSION IN ORDER TO ASSIST PATIENT IN ACHIEVING GOALS TO IMPROVE TRANSFERS, AMBULATION, ACTIVITY TOLERANCE, AND STRENGTH  PATIENT'S SELF REPORTED GOAL TO STRENGTHEN HER LEGS  SHE PERFORMED SIT-->STAND TRANSFER W/ MIN-AX1 (VERBAL CUING TO PUSH FROM CHAIR; PATIENT PRESENTING WITH TENDENCY TO PULL ON RW)  SHE AMBULATED 140 FEET W/ RW (2ND PERSON FOR LINES AND 2ND PERSON FOR CHAIR FOLLOW HOWEVER PATIENT DID NOT REQUIRED SEATED REST)  GAIT PRESENTS AS SLOW WITH DECREASED B/L LE CLEARANCE  VERBAL CUING PROVIDED TO IMPROVE B/L LE CLEARANCE  ANTICIPATE PATIENT COULD PROGRESS TO PUSHING IV POLE RATHER THAN USING RW HOWEVER PATIENT DECLINING AT THIS TIME  POST AMBULATION PATIENT PARTICIPATED IN ACTIVE B/L LE THERE-EX SEATED IN CHAIR DEMONSTRATING GOOD TOLERANCE (SEE THERE-EX SECTION)  NEXT SESSION PLAN TO PROGRESS AMBULATION TO LEAST RESTRICTIVE DEVICE AND IMPROVE OVERALL ACTIVITY TOLERANCE  RECOMMENDATION FOR INPT REHAB REMAINS APPROPRIATE AS THIS PATIENT CONTINUES TO FUNCTION BELOW HER I BASELINE  SHE WILL BENEFIT FROM CONTINUED SKILLED INPT PT THIS ADMISSION TO ACHIEVE MAXIMAL FUNCTION AND SAFETY     Barriers to Discharge: Inaccessible home environment     Recommendation: (S) Post acute IP rehab     PT - OK to Discharge: (S) Yes (TO INPT REHAB WHEN MED FREDERIC )    See flowsheet documentation for full assessment     Duane Gloria, PT

## 2018-03-05 NOTE — ASSESSMENT & PLAN NOTE
In the setting of peritonitis  CSF cultures negative and no clinical evidence of meningitis  Status post  shunt resection in 2 weeks IV antibiotics    · Continue to follow closely off antibiotics  · Follow mental status closely

## 2018-03-05 NOTE — PHYSICAL THERAPY NOTE
PT TREATMENT       03/05/18 0949   Pain Assessment   Pain Assessment FLACC   Pain Rating: FLACC (Rest) - Face 0   Pain Rating: FLACC (Rest) - Legs 0   Pain Rating: FLACC (Rest) - Activity 0   Pain Rating: FLACC (Rest) - Cry 0   Pain Rating: FLACC (Rest) - Consolability 0   Score: FLACC (Rest) 0   Pain Rating: FLACC (Activity) - Face 0   Pain Rating: FLACC (Activity) - Legs 0   Pain Rating: FLACC (Activity) - Activity 0   Pain Rating: FLACC (Activity) - Cry 0   Pain Rating: FLACC (Activity) - Consolability 0   Score: FLACC (Activity) 0   Restrictions/Precautions   Other Precautions Fall Risk;Multiple lines   General   Chart Reviewed Yes   Response to Previous Treatment Patient with no complaints from previous session  Family/Caregiver Present Yes  (FIANCE)   Cognition   Overall Cognitive Status WFL   Subjective   Subjective "LETS WORK ON MY LEGS"   Transfers   Sit to Stand 4  Minimal assistance   Additional items Assist x 1; Increased time required;Verbal cues   Stand to Sit 5  Supervision   Additional items Increased time required   Ambulation/Elevation   Gait pattern Excessively slow   Gait Assistance 4  Minimal assist   Additional items Assist x 1   Assistive Device Rolling walker   Distance 140 FEET    Balance   Static Sitting Good   Static Standing Fair -   Ambulatory Fair -   Endurance Deficit   Endurance Deficit Yes   Endurance Deficit Description FATIGUE; GROSS WEANKESS   Activity Tolerance   Activity Tolerance Patient limited by fatigue   Nurse Made Aware RN JAMIE    Exercises   Hip Flexion Sitting;20 reps;Bilateral;AROM   Hip Abduction Sitting;Bilateral;AROM;20 reps   Hip Adduction Sitting;20 reps;Bilateral;AROM  (PILLOW BETWEEN KNEES )   Knee AROM Long Arc Quad Sitting;20 reps;Bilateral;AROM   Assessment   Prognosis Good   Problem List Decreased strength;Decreased endurance; Impaired balance;Decreased mobility;Pain   Assessment PT INITIATED TREATMENT SESSION IN ORDER TO ASSIST PATIENT IN ACHIEVING GOALS TO IMPROVE TRANSFERS, AMBULATION, ACTIVITY TOLERANCE, AND STRENGTH  PATIENT'S SELF REPORTED GOAL TO STRENGTHEN HER LEGS  SHE PERFORMED SIT-->STAND TRANSFER W/ MIN-AX1 (VERBAL CUING TO PUSH FROM CHAIR; PATIENT PRESENTING WITH TENDENCY TO PULL ON RW)  SHE AMBULATED 140 FEET W/ RW (2ND PERSON FOR LINES AND 2ND PERSON FOR CHAIR FOLLOW HOWEVER PATIENT DID NOT REQUIRED SEATED REST)  GAIT PRESENTS AS SLOW WITH DECREASED B/L LE CLEARANCE  VERBAL CUING PROVIDED TO IMPROVE B/L LE CLEARANCE  ANTICIPATE PATIENT COULD PROGRESS TO PUSHING IV POLE RATHER THAN USING RW HOWEVER PATIENT DECLINING AT THIS TIME  POST AMBULATION PATIENT PARTICIPATED IN ACTIVE B/L LE THERE-EX SEATED IN CHAIR DEMONSTRATING GOOD TOLERANCE (SEE THERE-EX SECTION)  NEXT SESSION PLAN TO PROGRESS AMBULATION TO LEAST RESTRICTIVE DEVICE AND IMPROVE OVERALL ACTIVITY TOLERANCE  RECOMMENDATION FOR INPT REHAB REMAINS APPROPRIATE AS THIS PATIENT CONTINUES TO FUNCTION BELOW HER I BASELINE  SHE WILL BENEFIT FROM CONTINUED SKILLED INPT PT THIS ADMISSION TO ACHIEVE MAXIMAL FUNCTION AND SAFETY  Goals   Patient Goals TO IMPROVE STRENGTH OF HER LEGS    LTG Expiration Date 03/19/18   Treatment Day 11   Plan   Treatment/Interventions Functional transfer training;LE strengthening/ROM; Elevations; Therapeutic exercise;Patient/family training;Equipment eval/education; Bed mobility;Gait training;Spoke to nursing   Progress Progressing toward goals   PT Frequency 5x/wk   Recommendation   Recommendation Post acute IP rehab   Equipment Recommended Walker  (RW)   PT - OK to Discharge Yes  (TO INPT REHAB WHEN MED FREDERIC )   Duane Gloria, PT

## 2018-03-05 NOTE — ASSESSMENT & PLAN NOTE
Likely due to subcutaneous abscess  Continues to have significant drainage  Recent tube check shows no evidence of fistulous connection with deeper intraabdominal abscess    · Continue with local wound care as per surgery  · Follow wound drainage closely

## 2018-03-05 NOTE — PLAN OF CARE
Problem: PHYSICAL THERAPY ADULT  Goal: Performs mobility at highest level of function for planned discharge setting  See evaluation for individualized goals  Treatment/Interventions: Functional transfer training, LE strengthening/ROM, Therapeutic exercise, Endurance training, Bed mobility, Spoke to nursing  Equipment Recommended:  (R/O LEAST RESTRICTIVE AD )       See flowsheet documentation for full assessment, interventions and recommendations  Outcome: Progressing  Prognosis: Good  Problem List: Decreased strength, Decreased endurance, Impaired balance, Decreased mobility, Pain  Assessment: PT INITIATED TREATMENT SESSION IN ORDER TO ASSIST PATIENT IN ACHIEVING GOALS TO IMPROVE TRANSFERS, AMBULATION, ACTIVITY TOLERANCE, AND STRENGTH  PATIENT'S SELF REPORTED GOAL TO STRENGTHEN HER LEGS  SHE PERFORMED SIT-->STAND TRANSFER W/ MIN-AX1 (VERBAL CUING TO PUSH FROM CHAIR; PATIENT PRESENTING WITH TENDENCY TO PULL ON RW)  SHE AMBULATED 140 FEET W/ RW (2ND PERSON FOR LINES AND 2ND PERSON FOR CHAIR FOLLOW HOWEVER PATIENT DID NOT REQUIRED SEATED REST)  GAIT PRESENTS AS SLOW WITH DECREASED B/L LE CLEARANCE  VERBAL CUING PROVIDED TO IMPROVE B/L LE CLEARANCE  ANTICIPATE PATIENT COULD PROGRESS TO PUSHING IV POLE RATHER THAN USING RW HOWEVER PATIENT DECLINING AT THIS TIME  POST AMBULATION PATIENT PARTICIPATED IN ACTIVE B/L LE THERE-EX SEATED IN CHAIR DEMONSTRATING GOOD TOLERANCE (SEE THERE-EX SECTION)  NEXT SESSION PLAN TO PROGRESS AMBULATION TO LEAST RESTRICTIVE DEVICE AND IMPROVE OVERALL ACTIVITY TOLERANCE  RECOMMENDATION FOR INPT REHAB REMAINS APPROPRIATE AS THIS PATIENT CONTINUES TO FUNCTION BELOW HER I BASELINE  SHE WILL BENEFIT FROM CONTINUED SKILLED INPT PT THIS ADMISSION TO ACHIEVE MAXIMAL FUNCTION AND SAFETY     Barriers to Discharge: Inaccessible home environment     Recommendation: (S) Post acute IP rehab     PT - OK to Discharge: (S) Yes (TO INPT REHAB WHEN MED FREDERIC )    See flowsheet documentation for full assessment     Tiff Lerma, PT

## 2018-03-05 NOTE — SOCIAL WORK
Pt not medically clear pt for tentative swallow study today   PT   Remains NPO , tolerating tube feedings , picc , hayder    Pt being followed by [facility] cm updated  Facility

## 2018-03-05 NOTE — ASSESSMENT & PLAN NOTE
Likely multifactorial, largely due to above abscesses  Clinically stable without fever; improved  Remains stable after discontinuation of antibiotics/antifungals    · Continue to follow closely off antibiotics  · Follow temperatures closely  · Given clinically stability can check CBC PRN

## 2018-03-05 NOTE — ASSESSMENT & PLAN NOTE
Due to esophageal anastomotic leak  Status post multiple drains  Cultures with Enterococcus/Saccaromyces  Status post prolonged IV antibiotic/antifungal course  Remains stable off antibiotics    · Continue to follow closely off antibiotics  · Continue drains per surgery  · Follow temperatures closely  · Given clinical stability can recheck CBC PRN

## 2018-03-05 NOTE — PHYSICAL THERAPY NOTE
PT RE-EVALUATION     03/05/18 7953   Note Type   Note type Re-eval   Pain Assessment   Pain Assessment FLACC   Pain Rating: FLACC (Rest) - Face 0   Pain Rating: FLACC (Rest) - Legs 0   Pain Rating: FLACC (Rest) - Activity 0   Pain Rating: FLACC (Rest) - Cry 0   Pain Rating: FLACC (Rest) - Consolability 0   Score: FLACC (Rest) 0   Pain Rating: FLACC (Activity) - Face 1   Pain Rating: FLACC (Activity) - Legs 0   Pain Rating: FLACC (Activity) - Activity 0   Pain Rating: FLACC (Activity) - Cry 0   Pain Rating: FLACC (Activity) - Consolability 1   Score: FLACC (Activity) 2   Home Living   Type of Home House   Home Layout One level   Bathroom Shower/Tub Tub/shower unit   Bathroom Toilet Standard   Bathroom Accessibility Accessible   Home Equipment (PATIENT DENIES DME USE PTA )   Prior Function   Level of Rich Independent with ADLs and functional mobility   Lives With [employer] Help From Family   ADL Assistance Independent   IADLs Independent   Falls in the last 6 months 0   Vocational Unemployed   Restrictions/Precautions   Weight Bearing Precautions Per Order No   Braces or Orthoses (NONE )   Other Precautions Multiple lines;Telemetry; Fall Risk;Pain   General   Family/Caregiver Present Yes  (FIANCE)   Cognition   Overall Cognitive Status WFL   Arousal/Participation Cooperative   Attention Within functional limits   Memory Within functional limits   Following Commands Follows all commands and directions without difficulty   RUE Assessment   RUE Assessment WFL   LUE Assessment   LUE Assessment WFL   RLE Assessment   RLE Assessment X   Strength RLE   RLE Overall Strength 3+/5   Strength LLE   LLE Overall Strength 3+/5   Bed Mobility   Supine to Sit 4  Minimal assistance   Additional items Assist x 1;HOB elevated; Increased time required   Sit to Supine Unable to assess   Additional items (PATIENT OOB IN CHAIR AT END OF SESSION )   Transfers   Sit to Stand 4  Minimal assistance   Additional items Assist x 1;Increased time required;Verbal cues   Stand to Sit 4  Minimal assistance   Additional items Assist x 1; Increased time required;Verbal cues   Ambulation/Elevation   Gait pattern Excessively slow; Short stride   Gait Assistance 4  Minimal assist  (CG)   Additional items Assist x 1  (2ND PERSON FOR LINE MANAGEMENT + 3RD FOR CHAIR FOLLOW)   Assistive Device Rolling walker   Distance 140 FT    Stair Management Assistance Not tested   Balance   Static Sitting Normal   Static Standing Fair -   Ambulatory Fair -   Endurance Deficit   Endurance Deficit Yes   Endurance Deficit Description FATIGUE, GROSS WEAKNESS   Activity Tolerance   Activity Tolerance Patient limited by fatigue   Nurse Made Aware RN JAMIE   Assessment   Prognosis Good   Problem List Decreased strength;Decreased endurance; Impaired balance;Decreased mobility;Pain   Assessment PT COMPLETED RE-EVALUATION OF 36YEAR OLD FEMALE ORIGINALLY ADMITTED TO - ON 1/24/18 FOR ESOPHAGEAL STENT PLACEMENT SECONDARY TO GASTRIC PERFORATION  PT PERFORMED INITIAL EVALUATION ON 2/1/18, IN WHICH PATIENT REQUIRED MAX-A X1 FOR ROLLING IN BED  DURING RE-EVALUATION PERFORMED TODAY (10TH VISIT) PATIENT REQUIRED MIN-A X1 FOR BED MOBILITY AND SIT<-->STAND TRANSFERS (VERBAL CUES FOR HAND PLACEMENT)  PATIENT AMBULATED 140 FT W/ RW AND CG ASSIST (2ND PERSON FOR LINE MANAGEMENT & 3RD PERSON FOR CHAIR FOLLOW)  GAIT PRESENTS AS EXCESSIVELY SLOW, BUT OTHERWISE IS WNL  PATIENT REQUIRES VERBAL ENCOURAGEMENT TO PUSH HERSELF & EXPRESSES DESIRE TO WALK W/O RW, BUT IS RELUCTANT TO TRIAL OTHER AD  PLAN FOR NEXT SESSION IS TO INCREASE AMBULATION DISTANCE AND TRIAL HAVING PATIENT PUSH IV POLE INSTEAD OF USING RW  PATIENT REMAINS APPROPRIATE FOR D/C RECOMMENDATION OF INPT REHAB WHEN MEDICALLY APPROPRIATE      Barriers to Discharge Inaccessible home environment   Goals   Patient Goals TO IMPROVE STRENGTH OF HER LEGS    LTG Expiration Date 03/19/18   Long Term Goal #1 10-14 DAYS: 1) MOD-I FOR BED MOBILITY; 2) MOD-I FOR SIT<-->STAND TRANSFERS; 3) SUPERVISION TO AMBULATE 300 FT W/ LEAST RESTRICTIVE AD; 4) INCREASE B/L LE STRENGTH BY 1/2 GRADE; 5) INCREASE DYNAMIC BALANCE BY 1/2 GRADE; 6) INCREASE ACTIVITY TOLERANCE TO 1 HOUR; 7) COMPLETE 10 STAIRS WITH SUPERVISION   Treatment Day 10  (RE-EVAL PERFORMED TODAY )   Plan   Treatment/Interventions Functional transfer training;LE strengthening/ROM; Therapeutic exercise; Endurance training;Bed mobility;Gait training;Equipment eval/education;Spoke to nursing   PT Frequency 5x/wk   Recommendation   Recommendation Post acute IP rehab   Equipment Recommended Walker   PT - OK to Discharge Yes  (TO INPT REHAB WHEN MEDICALLY APPROPRIATE )   Barthel Index   Feeding 0   Bathing 0   Grooming Score 0   Dressing Score 5   Bladder Score 10   Bowels Score 10   Toilet Use Score 5   Transfers (Bed/Chair) Score 10   Mobility (Level Surface) Score 15   Stairs Score 0   Barthel Index Score 55     Heather Pierce, SPT

## 2018-03-06 ENCOUNTER — APPOINTMENT (INPATIENT)
Dept: RADIOLOGY | Facility: HOSPITAL | Age: 43
DRG: 711 | End: 2018-03-06
Attending: SURGERY
Payer: COMMERCIAL

## 2018-03-06 PROBLEM — R07.89 LEFT-SIDED CHEST WALL PAIN: Status: ACTIVE | Noted: 2018-03-06

## 2018-03-06 LAB
ERYTHROCYTE [DISTWIDTH] IN BLOOD BY AUTOMATED COUNT: 16.5 % (ref 11.6–15.1)
GLUCOSE SERPL-MCNC: 112 MG/DL (ref 65–140)
GLUCOSE SERPL-MCNC: 161 MG/DL (ref 65–140)
GLUCOSE SERPL-MCNC: 177 MG/DL (ref 65–140)
HCT VFR BLD AUTO: 35.3 % (ref 34.8–46.1)
HGB BLD-MCNC: 10.5 G/DL (ref 11.5–15.4)
MCH RBC QN AUTO: 27.1 PG (ref 26.8–34.3)
MCHC RBC AUTO-ENTMCNC: 29.7 G/DL (ref 31.4–37.4)
MCV RBC AUTO: 91 FL (ref 82–98)
PLATELET # BLD AUTO: 376 THOUSANDS/UL (ref 149–390)
PMV BLD AUTO: 10.2 FL (ref 8.9–12.7)
RBC # BLD AUTO: 3.88 MILLION/UL (ref 3.81–5.12)
WBC # BLD AUTO: 9.93 THOUSAND/UL (ref 4.31–10.16)

## 2018-03-06 PROCEDURE — 99024 POSTOP FOLLOW-UP VISIT: CPT | Performed by: SURGERY

## 2018-03-06 PROCEDURE — 99233 SBSQ HOSP IP/OBS HIGH 50: CPT | Performed by: INTERNAL MEDICINE

## 2018-03-06 PROCEDURE — 75984 XRAY CONTROL CATHETER CHANGE: CPT

## 2018-03-06 PROCEDURE — 82948 REAGENT STRIP/BLOOD GLUCOSE: CPT

## 2018-03-06 PROCEDURE — C1769 GUIDE WIRE: HCPCS

## 2018-03-06 PROCEDURE — C9113 INJ PANTOPRAZOLE SODIUM, VIA: HCPCS | Performed by: NURSE PRACTITIONER

## 2018-03-06 PROCEDURE — 0W9J30Z DRAINAGE OF PELVIC CAVITY WITH DRAINAGE DEVICE, PERCUTANEOUS APPROACH: ICD-10-PCS | Performed by: RADIOLOGY

## 2018-03-06 PROCEDURE — 85027 COMPLETE CBC AUTOMATED: CPT | Performed by: PHYSICIAN ASSISTANT

## 2018-03-06 PROCEDURE — 75984 XRAY CONTROL CATHETER CHANGE: CPT | Performed by: RADIOLOGY

## 2018-03-06 PROCEDURE — C1729 CATH, DRAINAGE: HCPCS

## 2018-03-06 PROCEDURE — 49423 EXCHANGE DRAINAGE CATHETER: CPT

## 2018-03-06 PROCEDURE — 49423 EXCHANGE DRAINAGE CATHETER: CPT | Performed by: RADIOLOGY

## 2018-03-06 RX ORDER — FENTANYL CITRATE 50 UG/ML
INJECTION, SOLUTION INTRAMUSCULAR; INTRAVENOUS CODE/TRAUMA/SEDATION MEDICATION
Status: COMPLETED | OUTPATIENT
Start: 2018-03-06 | End: 2018-03-06

## 2018-03-06 RX ADMIN — OXYCODONE HYDROCHLORIDE 10 MG: 5 SOLUTION ORAL at 17:10

## 2018-03-06 RX ADMIN — FENTANYL CITRATE 50 MCG: 50 INJECTION, SOLUTION INTRAMUSCULAR; INTRAVENOUS at 15:36

## 2018-03-06 RX ADMIN — INSULIN LISPRO 1 UNITS: 100 INJECTION, SOLUTION INTRAVENOUS; SUBCUTANEOUS at 12:49

## 2018-03-06 RX ADMIN — OXYCODONE HYDROCHLORIDE 10 MG: 5 SOLUTION ORAL at 22:30

## 2018-03-06 RX ADMIN — PANTOPRAZOLE SODIUM 40 MG: 40 INJECTION, POWDER, FOR SOLUTION INTRAVENOUS at 08:54

## 2018-03-06 RX ADMIN — IOHEXOL 15 ML: 300 INJECTION, SOLUTION INTRAVENOUS at 15:49

## 2018-03-06 RX ADMIN — INSULIN LISPRO 1 UNITS: 100 INJECTION, SOLUTION INTRAVENOUS; SUBCUTANEOUS at 06:04

## 2018-03-06 RX ADMIN — HEPARIN SODIUM 5000 UNITS: 5000 INJECTION, SOLUTION INTRAVENOUS; SUBCUTANEOUS at 20:12

## 2018-03-06 RX ADMIN — ONDANSETRON 4 MG: 2 INJECTION INTRAMUSCULAR; INTRAVENOUS at 04:40

## 2018-03-06 RX ADMIN — METOPROLOL TARTRATE 10 MG: 1 INJECTION, SOLUTION INTRAVENOUS at 17:10

## 2018-03-06 RX ADMIN — METOPROLOL TARTRATE 10 MG: 1 INJECTION, SOLUTION INTRAVENOUS at 00:13

## 2018-03-06 RX ADMIN — HYDROMORPHONE HYDROCHLORIDE 0.5 MG: 1 INJECTION, SOLUTION INTRAMUSCULAR; INTRAVENOUS; SUBCUTANEOUS at 20:12

## 2018-03-06 RX ADMIN — OXYCODONE HYDROCHLORIDE 10 MG: 5 SOLUTION ORAL at 08:55

## 2018-03-06 RX ADMIN — OXYCODONE HYDROCHLORIDE 10 MG: 5 SOLUTION ORAL at 00:26

## 2018-03-06 RX ADMIN — OXYCODONE HYDROCHLORIDE 10 MG: 5 SOLUTION ORAL at 13:02

## 2018-03-06 RX ADMIN — INSULIN LISPRO 1 UNITS: 100 INJECTION, SOLUTION INTRAVENOUS; SUBCUTANEOUS at 00:13

## 2018-03-06 RX ADMIN — METOPROLOL TARTRATE 10 MG: 1 INJECTION, SOLUTION INTRAVENOUS at 06:06

## 2018-03-06 RX ADMIN — OXYCODONE HYDROCHLORIDE 10 MG: 5 SOLUTION ORAL at 04:40

## 2018-03-06 RX ADMIN — ONDANSETRON 4 MG: 2 INJECTION INTRAMUSCULAR; INTRAVENOUS at 00:26

## 2018-03-06 RX ADMIN — METOPROLOL TARTRATE 10 MG: 1 INJECTION, SOLUTION INTRAVENOUS at 12:39

## 2018-03-06 RX ADMIN — HEPARIN SODIUM 5000 UNITS: 5000 INJECTION, SOLUTION INTRAVENOUS; SUBCUTANEOUS at 08:54

## 2018-03-06 RX ADMIN — ONDANSETRON 4 MG: 2 INJECTION INTRAMUSCULAR; INTRAVENOUS at 20:12

## 2018-03-06 RX ADMIN — FENTANYL CITRATE 50 MCG: 50 INJECTION, SOLUTION INTRAMUSCULAR; INTRAVENOUS at 15:11

## 2018-03-06 RX ADMIN — ONDANSETRON 4 MG: 2 INJECTION INTRAMUSCULAR; INTRAVENOUS at 13:12

## 2018-03-06 NOTE — PROGRESS NOTES
Patient care rounds were completed with the patient's nurse today, Carolyne Bosworth  We discussed the plan is to continue current plan of care with NPO status and and enteral nutrition via feeding tube  Continue to monitor off antibiotics with lab holiday tomorrow  Continue current drains and ostomy appliance for management of midline abdominal wound drainage  We reviewed all of the invasive devices/lines/telemetry orders   - Right-sided PICC line for IV access; anticipate discontinuation of line prior to discharge  Pain Assessment / Plan:  - Continue current analgesic regimen via feeding tube  Mobility Assessment / Plan:  - Continue PT and OT evaluation and treatment as indicated with activity as tolerated  Goals / Barriers for discharge:  - Anticipate possible discharge to rehab later this week pending review by the rehab facility   - Case management following  All questions and concerns were addressed  I spent greater than 20 minutes reviewing the plan with the patient and the nurse, and coordinating her care for the day      Terrence Carr PA-C  3/6/2018 10:43 AM

## 2018-03-06 NOTE — PROGRESS NOTES
Progress Note - Infectious Disease   Erica Dumas 36 y o  female MRN: [id number]  Unit/Bed#: [facility] 834-01 Encounter: [id number]      Impression/Recommendations:  Postoperative intra-abdominal abscess [facility])   Assessment & Plan    Due to esophageal anastomotic leak  Status post multiple drains  Cultures with Enterococcus/Saccaromyces  Status post prolonged IV antibiotic/antifungal course  Remains stable off antibiotics    · Continue to follow closely off antibiotics  · Continue drains per surgery  · Follow temperatures closely  · Recheck CBC on Thursday        Esophageal anastomotic leak   Assessment & Plan    Following repair of iatrogenic gastric perforation  Status post stent exchange/replacement  Recent upper GI shows persistent leak    · Patient NPO per surgery  · Per surgery may take several weeks for complete resolution  Repeat imaging on hold as won't change immediate management          Abdominal wound dehiscence   Assessment & Plan    Likely due to subcutaneous abscess  Continues to have significant drainage  Recent tube check shows no evidence of fistulous connection with deeper intraabdominal abscess    · Continue with local wound care as per surgery  · Follow wound drainage closely        Infection of  (ventriculoperitoneal) shunt, subsequent encounter   Assessment & Plan    In the setting of peritonitis  CSF cultures negative and no clinical evidence of meningitis  Status post  shunt resection in 2 weeks IV antibiotics    · Continue to follow closely off antibiotics  · Follow mental status closely        Neutrophilic leukocytosis   Assessment & Plan    Likely multifactorial, largely due to above abscesses  Clinically stable without fever  Remains stable after discontinuation of antibiotics/antifungals    · Continue to follow closely off antibiotics  · Follow temperatures closely  · Recheck CBC on Thursday        * Gastric leak   Assessment & Plan    Iatrogenic in setting of recent hiatal hernia repair  Status post repair        Left-sided chest wall pain   Assessment & Plan    New  Positional nature suspects likely due to subphrenic drain  Low suspicion for pneumonia or PE    · Patient repositioned to right side  · Monitor symptoms closely  · If drain output remains low, consider discontinuing drain but defer to surgery        The patient is stable from an ID standpoint  I will reassess the patient on Thursday 3/8  Please call in the interim with new questions  Discussed in detail with the [facility] surgery service    Antibiotics:  Off antibiotics #3  POD # 39  Postdrainage # 17    Subjective:  Patient seen on AM rounds  Denies fevers, chills, or sweats  Denies nausea, vomiting, or diarrhea  Complains of severe left-sided rib pain, worse with speaking or deep inspiration  After the patient rolls over it is noted that there is a subphrenic drain in place in this area  Change position resolved her symptoms  Denies cough or shortness of breath  24 Hour Events:  None    Objective:  Vitals:  HR:  [88-94] 88  Resp:  [16-18] 18  BP: (124-131)/(65-79) 129/79  SpO2:  [96 %-97 %] 96 %  Temp (24hrs), Av 6 °F (37 °C), Min:98 4 °F (36 9 °C), Max:98 7 °F (37 1 °C)  Current: Temperature: 98 7 °F (37 1 °C)    Physical Exam:   General:  No acute distress  Eyes:  Normal lids and conjunctivae  ENT:  Normal external ears and nose  Neck:  Neck symmetric with midline trachea  Pulmonary:  Normal respiratory effort without accessory muscle use  Thorax:  Left subphrenic drain exit site left thorax clean  Clear fluid noted in ADENIKE bulb  Cardiovascular:  Regular rate and rhythm; no peripheral edema  Gastrointestinal:  No tenderness or distention, greenish drainage noted in midline drain bulb  Midline wound manager with purulent liquid  Musculoskeletal:  No digital clubbing or cyanosis  Skin:  No visible rashes;  No palpable nodules  Neurologic:  Sensation grossly intact to light touch  Psychiatric:  Alert and oriented, slightly anxious    Lab Results:  I have personally reviewed pertinent labs  Results from last 7 days  Lab Units 03/05/18  0434 03/04/18  0432 03/02/18  0558   SODIUM mmol/L 144 142 142   POTASSIUM mmol/L 3 7 3 5 3 6   CHLORIDE mmol/L 104 104 104   CO2 mmol/L 36* 36* 35*   ANION GAP mmol/L 4 2* 3*   BUN mg/dL 17 17 15   CREATININE mg/dL 0 47* 0 44* 0 47*   EGFR ml/min/1 73sq m 121 124 121   GLUCOSE RANDOM mg/dL 181* 186* 153*   CALCIUM mg/dL 9 3 9 1 9 9       Results from last 7 days  Lab Units 03/06/18  0454 03/05/18  0434 03/04/18  0432   WBC Thousand/uL 9 93 16 03* 17 02*   HEMOGLOBIN g/dL 10 5* 9 0* 9 1*   PLATELETS Thousands/uL 376 448* 451*           Imaging Studies:   I have personally reviewed pertinent imaging study reports and images in PACS  EKG, Pathology, and Other Studies:   I have personally reviewed pertinent reports

## 2018-03-06 NOTE — ASSESSMENT & PLAN NOTE
Positional nature suspects likely due to subphrenic drain  Now status post drain exchange  Low suspicion for pneumonia or PE    · Patient repositioning PRN  · Monitor symptoms closely  · Monitor drain output closely per primary service

## 2018-03-06 NOTE — NUTRITION
03/06/18 1217   Recommendations/Interventions   Summary Patient remains NPO, tolerating current EN @ goal rate  Weight loss noted, suspect some secondary to fluid losses  Right and left upper/lower extremity non-pitting edema noted  Interventions EN change formula/rate   Nutrition Recommendations Tube Feeding Recommendation provided; Other (specify); Lab - consider order (specify)  (Suggest increase EN to Jevity 1 2 kcal @ 75 ml/hr with 100 ml free h2o flush every 6 hours (2160 kcal, 99 gms pro, 1852 ml tv)  Obtain weekly standing weight   Monitor electrolytes  )

## 2018-03-06 NOTE — PROGRESS NOTES
Progress Note - General Surgery   Chalo Cano 36 y o  female MRN: [id number]  Unit/Bed#: [facility] 834-01 Encounter: [id number]    Assessment:  36 F with gastric perforation after hiatal hernia, s/p ex-lap washout, EGD and stent placement x2    Plan:  F/u AM CBC  NPO/ice chips  TF @ 70  Prn pain and nausea control  JPs to bulb suction  Midline wound manger  IS/OOB/ambulate  SQH    Subjective/Objective       Subjective:   No acute events  Pain continues and is stable  Having bowel function  Objective:    Blood pressure 131/74, pulse 88, temperature 98 7 °F (37 1 °C), temperature source Oral, resp  rate 18, height 5' 4" (1 626 m), weight 60 3 kg (132 lb 15 oz), SpO2 96 %, not currently breastfeeding  ,Body mass index is 22 82 kg/m²  Intake/Output Summary (Last 24 hours) at 03/06/18 0554  Last data filed at 03/06/18 0437   Gross per 24 hour   Intake              270 ml   Output             2195 ml   Net            -1925 ml       Invasive Devices     Peripherally Inserted Central Catheter Line            PICC Line 09/27/45 Right Basilic 44 days          Drain            Closed/Suction Drain Midline;Superior Abdomen Other (Comment) 10 Fr  17 days    Closed/Suction Drain Left;Lateral LUQ Bulb 12 Fr  10 days    NG/OG/Enteral Tube Nasogastric 8 Fr Left nares 10 days    Open Drain Midline Abdomen 1 day                Physical Exam:   Gen: NAD, AAOx3  Keofed in place  CV: RRR  Pulm: no resp distress  Abd: Soft, non-distended, appropriately tender  Midline wound manager with clear/white output   JPs w/ white/green output          Results from last 7 days  Lab Units 03/05/18 0434 03/04/18  0432 03/02/18  0558   WBC Thousand/uL 16 03* 17 02* 13 14*   HEMOGLOBIN g/dL 9 0* 9 1* 9 1*   HEMATOCRIT % 30 3* 30 1* 30 5*   PLATELETS Thousands/uL 448* 451* 507*       Results from last 7 days  Lab Units 03/05/18 0434 03/04/18  0432 03/02/18  0558   SODIUM mmol/L 144 142 142   POTASSIUM mmol/L 3 7 3 5 3 6   CHLORIDE mmol/L 104 104 104   CO2 mmol/L 36* 36* 35*   BUN mg/dL 17 17 15   CREATININE mg/dL 0 47* 0 44* 0 47*   GLUCOSE RANDOM mg/dL 181* 186* 153*   CALCIUM mg/dL 9 3 9 1 9 9

## 2018-03-07 LAB
ERYTHROCYTE [DISTWIDTH] IN BLOOD BY AUTOMATED COUNT: 16.6 % (ref 11.6–15.1)
GLUCOSE SERPL-MCNC: 153 MG/DL (ref 65–140)
GLUCOSE SERPL-MCNC: 169 MG/DL (ref 65–140)
GLUCOSE SERPL-MCNC: 171 MG/DL (ref 65–140)
GLUCOSE SERPL-MCNC: 181 MG/DL (ref 65–140)
GLUCOSE SERPL-MCNC: 200 MG/DL (ref 65–140)
HCT VFR BLD AUTO: 30.7 % (ref 34.8–46.1)
HGB BLD-MCNC: 9.3 G/DL (ref 11.5–15.4)
MCH RBC QN AUTO: 27.3 PG (ref 26.8–34.3)
MCHC RBC AUTO-ENTMCNC: 30.3 G/DL (ref 31.4–37.4)
MCV RBC AUTO: 90 FL (ref 82–98)
PLATELET # BLD AUTO: 421 THOUSANDS/UL (ref 149–390)
PMV BLD AUTO: 10.1 FL (ref 8.9–12.7)
RBC # BLD AUTO: 3.41 MILLION/UL (ref 3.81–5.12)
WBC # BLD AUTO: 11.23 THOUSAND/UL (ref 4.31–10.16)

## 2018-03-07 PROCEDURE — 99024 POSTOP FOLLOW-UP VISIT: CPT | Performed by: SURGERY

## 2018-03-07 PROCEDURE — 82948 REAGENT STRIP/BLOOD GLUCOSE: CPT

## 2018-03-07 PROCEDURE — 85027 COMPLETE CBC AUTOMATED: CPT | Performed by: SURGERY

## 2018-03-07 PROCEDURE — C9113 INJ PANTOPRAZOLE SODIUM, VIA: HCPCS | Performed by: NURSE PRACTITIONER

## 2018-03-07 RX ADMIN — OXYCODONE HYDROCHLORIDE 10 MG: 5 SOLUTION ORAL at 08:37

## 2018-03-07 RX ADMIN — PANTOPRAZOLE SODIUM 40 MG: 40 INJECTION, POWDER, FOR SOLUTION INTRAVENOUS at 08:37

## 2018-03-07 RX ADMIN — METOPROLOL TARTRATE 10 MG: 1 INJECTION, SOLUTION INTRAVENOUS at 12:55

## 2018-03-07 RX ADMIN — METOPROLOL TARTRATE 10 MG: 1 INJECTION, SOLUTION INTRAVENOUS at 00:34

## 2018-03-07 RX ADMIN — HYDROMORPHONE HYDROCHLORIDE 0.5 MG: 1 INJECTION, SOLUTION INTRAMUSCULAR; INTRAVENOUS; SUBCUTANEOUS at 15:09

## 2018-03-07 RX ADMIN — HEPARIN SODIUM 5000 UNITS: 5000 INJECTION, SOLUTION INTRAVENOUS; SUBCUTANEOUS at 20:03

## 2018-03-07 RX ADMIN — METOPROLOL TARTRATE 10 MG: 1 INJECTION, SOLUTION INTRAVENOUS at 23:15

## 2018-03-07 RX ADMIN — OXYCODONE HYDROCHLORIDE 10 MG: 5 SOLUTION ORAL at 17:22

## 2018-03-07 RX ADMIN — ONDANSETRON 4 MG: 2 INJECTION INTRAMUSCULAR; INTRAVENOUS at 01:15

## 2018-03-07 RX ADMIN — METOPROLOL TARTRATE 10 MG: 1 INJECTION, SOLUTION INTRAVENOUS at 17:22

## 2018-03-07 RX ADMIN — ONDANSETRON 4 MG: 2 INJECTION INTRAMUSCULAR; INTRAVENOUS at 15:10

## 2018-03-07 RX ADMIN — OXYCODONE HYDROCHLORIDE 10 MG: 5 SOLUTION ORAL at 13:00

## 2018-03-07 RX ADMIN — INSULIN LISPRO 1 UNITS: 100 INJECTION, SOLUTION INTRAVENOUS; SUBCUTANEOUS at 12:58

## 2018-03-07 RX ADMIN — INSULIN LISPRO 1 UNITS: 100 INJECTION, SOLUTION INTRAVENOUS; SUBCUTANEOUS at 17:23

## 2018-03-07 RX ADMIN — HYDROMORPHONE HYDROCHLORIDE 0.5 MG: 1 INJECTION, SOLUTION INTRAMUSCULAR; INTRAVENOUS; SUBCUTANEOUS at 01:15

## 2018-03-07 RX ADMIN — HYDROMORPHONE HYDROCHLORIDE 0.5 MG: 1 INJECTION, SOLUTION INTRAMUSCULAR; INTRAVENOUS; SUBCUTANEOUS at 19:57

## 2018-03-07 RX ADMIN — INSULIN LISPRO 1 UNITS: 100 INJECTION, SOLUTION INTRAVENOUS; SUBCUTANEOUS at 05:49

## 2018-03-07 RX ADMIN — ONDANSETRON 4 MG: 2 INJECTION INTRAMUSCULAR; INTRAVENOUS at 19:55

## 2018-03-07 RX ADMIN — ONDANSETRON 4 MG: 2 INJECTION INTRAMUSCULAR; INTRAVENOUS at 05:45

## 2018-03-07 RX ADMIN — OXYCODONE HYDROCHLORIDE 10 MG: 5 SOLUTION ORAL at 23:15

## 2018-03-07 RX ADMIN — INSULIN LISPRO 1 UNITS: 100 INJECTION, SOLUTION INTRAVENOUS; SUBCUTANEOUS at 00:34

## 2018-03-07 RX ADMIN — INSULIN LISPRO 2 UNITS: 100 INJECTION, SOLUTION INTRAVENOUS; SUBCUTANEOUS at 23:27

## 2018-03-07 RX ADMIN — METOPROLOL TARTRATE 10 MG: 1 INJECTION, SOLUTION INTRAVENOUS at 05:40

## 2018-03-07 RX ADMIN — HEPARIN SODIUM 5000 UNITS: 5000 INJECTION, SOLUTION INTRAVENOUS; SUBCUTANEOUS at 08:37

## 2018-03-07 RX ADMIN — HYDROMORPHONE HYDROCHLORIDE 0.5 MG: 1 INJECTION, SOLUTION INTRAMUSCULAR; INTRAVENOUS; SUBCUTANEOUS at 05:45

## 2018-03-07 RX ADMIN — OXYCODONE HYDROCHLORIDE 10 MG: 5 SOLUTION ORAL at 02:57

## 2018-03-07 NOTE — PSYCH
Referral Question and Neuropsychological Necessity  Dakota Rodriguez is a pleasant, right-handed, 62-year-old, White woman whose medical history is remarkable for pseudotumor cerebri that was initially treated with a lumbar-peritoneal (LP) shunt, which was subsequently removed and replaced by a ventriculoperitoneal () shunt by Dr Kerry Nguyen in May, 2017  The patient has experienced cognitive changes and was referred for a neuropsychological consultation by her neurologist, Dr Doretha Stevenson, for characterization of her neurocognitive and emotional functioning to assist with treatment recommendations  The patient underwent a comprehensive neuropsychological evaluation on 12/13/2017 and returned today (1/10/2018) for a neuropsychological feedback session  1/10/2018: [CPT 38983: 1 hour of professional time spent with the patient reviewing the neuropsychological results and recommendations and preparing this report]  73/56/1431: [Total licensed billing psychologist's professional time including clinical interview; test selection, administration and scoring; interpretation of tests administered; integration of test results and other clinical data, and preparing final report = (85586: 7 hours)]       Presenting Concerns  The following information was obtained through a clinical interview with the patient, as well as through review of available medical records  Ms Keshav Chavez reported that for approximately 4 months, she experienced tremendous pressure in her head, accompanied by excruciating pain that eventually led to her diagnosis of pseudotumor cerebri in November, 2015  At some point in time, symptomatology progressed to include visual disturbances and instability while walking, which had become so compromising that Ms Nolan required assistance, even with basic activities of daily living (ADLs)   The patient stated that her recollection for that period of time, including her medical work-up and eventual diagnosis is very hazy  She underwent placement of an LP shunt through an outside hospital in November, 2015, with no significant improvement, requiring numerous subsequent lumbar punctures for pressure relief  In December, 2016, Ms Rea Nicole underwent gastric sleeve surgery due to obesity and the additional benefit of improving intracranial pressure  While that patient lost approximately 100lbs over the past year, symptoms associated with pseudotumor cerebri persisted  Ms Rea Nicole initiated care with Dr Tucker Ahmadi in April, 2017, who recommended removal of the LP shunt and placement of the  shunt  In May, 2017, the patient underwent the procedure, which she believes has helped to some degree, despite persisting pain and pressure  The patient also noted transient visual disturbances, and an ophthalmological examination documented papilledema in both eyes with increase intracranial pressure  An MRI of the brain, conducted in March, 2017, reportedly revealed that âventricles are lower limits of normal in size, consistent with pseudotumor cerebri,â along with nonspecific white matter changes  A CT of the head, conducted in October, 2017, was reportedly unremarkable, with no evidence of ventriculomegaly  At baseline, there is constant pain, which can then reach excruciating levels to the point where there are days that Ms Rea Nicole does not leave her bed  For instance, last week Ms Rea Nicole stated that she was in bed every day  She is prescribed Fioricet, which she did not take today due to concerns that it would adversely impact her test performance  The patient discontinued treatment with nortriptyline due to adverse effects  Cognitively speaking, Ms Rea Nicole reported changes over the past 2 years, primarily characterized by increased forgetfulness, such as repeating herself during conversation, and word substitutions (replacing âcue tipâ for âclothespinâ)   Elaborating on her forgetfulness, the patient stated that she will have difficulty remembering how to get to familiar locations, or she may have difficulty adjusting in the moment, like when she encounters a detour  Another recent example was when she forgot which debit card her fiance told her to use when shopping  She inadvertently used the wrong card and the couple had to transfer money to a different account to avoid overdraft fees  Ms Jose Snow also noted difficulty with planning, time management, and multitasking, as she acknowledged that she is easily distracted and frequently loses her train of thought  While she was unsure of the progression of the cognitive changes, Ms Jose Snow believes that they have remained relatively stable and that there has not been significant progression over time  Her fiance currently manages the finances, because the patient is concerned that she will âmess things up  â Ms Jose Snow is able to do the shopping, and she uses a system to facilitate medication management  Issues with driving were denied  Ms Jose Snow acknowledged that the pain has severely impacted her quality of life, and she feels that others do not understand what she is experiencing, as they âthink it is just a headache  â The patient is a  by [employer], but she has not been taking orders due to concerns that she will unable to complete them due to not feeling well  Active Problems    1  Cognitive changes (799 59) (R41 89)   2  Depression with anxiety (300 4) (F41 8)   3  Gastric bypass status for obesity (V45 86) (Z98 84)   4  GERD (gastroesophageal reflux disease) (530 81) (K21 9)   5  H/O: hysterectomy (V88 01) (Z98 890,Z90 710)   6  Hypercholesteremia (272 0) (E78 00)   7  Occipital headache (784 0) (R51)   8  Pseudotumor cerebri (348 2) (G93 2)   9  S/P  shunt (V45 2) (Z98 2)   10  Subacromial bursitis, right (726 19) (M75 51)   11  Visit for screening mammogram (V76 12) (Z12 31)    Past Medical History    1   History of obesity (V12 29) (Z86 39)   2  History of visual field defect (V12 49) (Z86 69)   3  History of Other disorders of optic nerve, not elsewhere classified, 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)   (H47 092)   4  History of Papilledema, both eyes (377 00) (H47 10)   5  History of Presence of lumboperitoneal shunt (V45 2) (Z98 2)   6  History of Right rotator cuff tendinitis (726 10) (M75 81)    The active problems and past medical history were reviewed and updated today  Surgical History    The surgical history was reviewed and updated today  Social History    · Employed   · Former smoker (E87 35) (R48 815)   · Has 2 children   · High school or GED   · No alcohol use   · No illicit drug use   · Non drinker / no alcohol use   · Sexually active   · Single  The social history was reviewed and updated today  The social history was reviewed and is unchanged  Family History    1  Family history of No significant active problems    2  Family history of No significant active problems    3  Family history of malignant neoplasm of thyroid (V16 8) (Z80 8)    4  Family history of    5  Family history of malignant neoplasm (V16 9) (Z80 9)    6  Family history of Colon cancer   7  Family history of     6  Family history of    5  Family history of malignant neoplasm (V16 9) (Z80 9)    The family history was reviewed and updated today  Current Meds   1  Biotin 1 MG Oral Capsule; 4 gummies daily; Therapy: [id number] to Recorded   2  Butalbital-APAP-Caffeine -40 MG Oral Capsule; TAKE 1 CAPSULE  PRN; Last   Rx:[id number] Ordered   3  Calcium 500-100 MG-UNIT Oral Tablet Chewable; 6 TABLETS DAILY; Therapy: [id number] to Recorded   4  Fiber Select Gummies Oral Tablet Chewable; 2 gummies daily; Therapy: [id number] to Recorded   5  Multivitamins TABS; TAKE 1 TABLET DAILY; Therapy: (Recorded:[id number]) to Recorded   6   Nortriptyline HCl - 10 MG Oral Capsule; 1 capsule at bedtime for 1 week then 2 capsules   at bedtime; Therapy: [id number] to (Evaluate:18Apr2018)  Requested for: 37ZXH0386; Last   Rx:[id number] Ordered   7  Protonix 40 MG Oral Tablet Delayed Release; TAKE 1 TABLET DAILY; Therapy: (Recorded:11Aug2017) to Recorded   8  Vitamin B-12 1000 MCG Oral Tablet; 1 tablet daily; Therapy: [id number] to Recorded   9  Vitamin D3 2000 UNIT Oral Capsule; 2 CAPSULES DAILY; Therapy: [id number] to Recorded    The medication list was reviewed today  Allergies    1  Benadryl   2  Phenergan    Main Findings  Intellectual functioning was characterized by low average verbal/conceptual abilities and borderline visuoperceptual abilities  Immediate auditory attention fluctuated throughout the evaluation, while concentration and working memory were intact  Visuomotor information processing was generally within normal limits across measures  There was evidence of mild executive dysfunction, characterized by impaired problem-solving, cognitive rigidity, perseveration, and reduced self-monitoring  Response inhibition, abstract reasoning, and generative word fluency were within normal limits  Language abilities appeared well-preserved, while visuoperceptual functions, including visuoconstruction, were impaired  During one of these measures, Ms Evalee Kawasaki became quite emotional due to the perceived difficulty  Auditory learning and memory were characterized primarily by impaired encoding, though there was some evidence of retrieval difficulty as well  However, there was no evidence of forgetting  The patient exhibited marked difficulty when information was presented in narrative format, as she had difficulty grasping the basic themes of the stories, possibly due to being overwhelmed by the amount of information  Visual learning and memory were generally intact   Upper extremity motor speed and fine manual dexterity were impaired with the dominant right hand, though this was likely secondary due to the patient's history of numerous surgeries on that hand  Her performance with the nondominant left hand was intact  Objective emotional testing revealed that Ms Billy Soria is experiencing significant depression and anxiety, along with concern regarding her physical functioning  It is possible that psychological factors, including her reported symptoms of depression and anxiety, may, in part, contribute to her numerous physical symptoms  Discussion/Summary    Ms Billy Soria arrived promptly for her appointment and was accompanied by her fiance, who remained in the waiting room  Her affect was initially flat  The results were reviewed, in detail, and all of the patient's questions were addressed to the best of my ability  I explained that there was evidence of neurocognitive dysfunction characterized by fluctuating attention, mild executive dysfunction, visuoperceptual deficits, and impaired encoding of auditory information, with some evidence of retrieval difficulty as well  There was, however, no evidence of forgetting  Ms Billy Soria endorsed chronic pain and discomfort associated with her diagnosis of pseudotumor cerebri that, at times, reaches excruciating levels and severely compromises her functioning  The pain and compromised functioning likely contribute to her significant psychological distress, which then in turn exacerbates her pain and physical symptoms  There is some literature that illustrates cognitive dysfunction in individuals with pseudotumor cerebri (idiopathic intracranial hypertension)  The pattern of cognitive dysfunction observed in individuals with idiopathic intracranial hypertension is heterogeneous, with studies demonstrating deficits in visuospatial functions, verbal learning and memory, working memory, executive functions, and reaction time, some of which may persist even after treatment is initiated  It was also noted that her chronic pain and pain medications may contribute to cognitive difficulty   Lastly, and les likely, in light of her significant weight loss over he past year or so, nutritional etiologies to cognitive dysfunction should be ruled out (e g , vitamin deficiencies, specifically thiamine)  We reviewed ways in which the patient can compensate for these weaknesses  A big part of the patient's presentation is her poorly-controlled depression and anxiety, some of which has been longstanding  For instance, consistent with her results from emotional/personality testing, Ms Nolan endorsed the presence of specific phobias (i e , ketchup and velvet) and relies on avoidance to cope with these fears  It is very likely that she relies on avoidance to cope with emotional distress in other situations as well  Her personality testing suggested the possibility that some of her physical symptoms may be rooted in psychological etiologies, which may be a function of her avoidance  It will be import for Ms Nolan to address her avoidant tendencies to 1) alleviate her current distress and 2) reduce physical symptoms associated with psychological origins  The patient currently participates in weekly support groups for weight loss  She is going to discuss the possibility of initiating individual therapy with her counselor  She will contact me if she has questions/concerns or difficulty establishing care  The patient was provided a copy of her report  Assessment    1  Pseudotumor cerebri (348 2) (G93 2)   2  Cognitive changes (799 59) (R41 89)   3  Depression with anxiety (300 4) (F41 8)   4  S/P  shunt (V45 2) (Z98 2)    Future Appointments    Date/Time Provider Specialty Site   01/31/2018 09:00 AM Jr Rivera [facility] NEUROLOGY [facility]   02/19/2018 10:00 AM Julee Avalos DO Internal Medicine [facility]     Signatures   Electronically signed by :  Greta Dent D ; Jan 14 2018  9:32AM EST                       (Author)

## 2018-03-07 NOTE — SOCIAL WORK
CM set up tentative transport for Friday [facility]    CM uses [facility] for 1:00    Cm messaged [facility] and they will work on [facility]   Cm notified pt and pt nurse   Cm will follow and need phone and fax numbers

## 2018-03-07 NOTE — PSYCH
Referral Question and Neuropsychological Necessity  Reason For Visit Free Text Note Form: Sergio Barrientos is a pleasant, right-handed, 48-year-old, White woman whose medical history is remarkable for pseudotumor cerebri that was initially treated with a lumbar-peritoneal (LP) shunt, which was subsequently removed and replaced by a ventriculoperitoneal () shunt by Dr Parvez Whitfield in May, 2017  The patient has experienced cognitive changes and was referred for a neuropsychological consultation by her neurologist, Dr Kinsey Arrington, for characterization of her neurocognitive and emotional functioning to assist with treatment recommendations  Tests Administered:   Rogerio & Rogerio (BNT), [place] Verbal Learning Test-Second Edition (CVLT-II), Category Fluency ( Animals ), Controlled Oral Word Association Test (COWAT), Grooved Pegboard, Personality Assessment Inventory (GOLDEN), Gabriel Complex Figure Test (RCFT; Copy Only), Stroop Color Word Test (SCWT), TOMM, Trail Making Test (TMT), Wechsler Adult Intelligence Scale-Fourth Edition (WAIS-IV), Wechsler Memory Scale-Fourth Edition (WMS-IV; Select Subtests) and [facility] Edition (WCST-64)  Comments: Written consent was obtained prior to the evaluation (scanned and stored in the patient's electronic and paper charts)  [Total licensed billing psychologist's professional time including clinical interview; test selection, administration and scoring; interpretation of tests administered; integration of test results and other clinical data, and preparing final report = (80519: 7 hours)]  Presenting Concerns  The following information was obtained through a clinical interview with the patient, as well as through review of available medical records   Ms Lan Cardoza reported that for approximately 4 months, she experienced tremendous pressure in her head, accompanied by excruciating pain that eventually led to her diagnosis of pseudotumor cerebri in November, 2015  At some point in time, symptomatology progressed to include visual disturbances and instability while walking, which had become so compromising that Ms Nolan required assistance, even with basic activities of daily living (ADLs)  The patient stated that her recollection for that period of time, including her medical work-up and eventual diagnosis is very hazy  She underwent placement of an LP shunt through an outside hospital in November, 2015, with no significant improvement, requiring numerous subsequent lumbar punctures for pressure relief  In December, 2016, Ms Alonzo Whitley underwent gastric sleeve surgery due to obesity and the additional benefit of improving intracranial pressure  While that patient lost approximately 100lbs over the past year, symptoms associated with pseudotumor cerebri persisted  Ms Alonzo Whitley initiated care with Dr Day in April, 2017, who recommended removal of the LP shunt and placement of the  shunt  In May, 2017, the patient underwent the procedure, which she believes has helped to some degree, despite persisting pain and pressure  The patient also noted transient visual disturbances, and an ophthalmological examination documented papilledema in both eyes with increase intracranial pressure  An MRI of the brain, conducted in March, 2017, reportedly revealed that âventricles are lower limits of normal in size, consistent with pseudotumor cerebri,â along with nonspecific white matter changes  A CT of the head, conducted in October, 2017, was reportedly unremarkable, with no evidence of ventriculomegaly  At baseline, there is constant pain, which can then reach excruciating levels to the point where there are days that Ms Alonzo Whitley does not leave her bed  For instance, last week Ms Alonzo Whitley stated that she was in bed every day  She is prescribed Fioricet, which she did not take today due to concerns that it would adversely impact her test performance   The patient discontinued treatment with nortriptyline due to adverse effects  Cognitively speaking, Ms Geovanny Talavera reported changes over the past 2 years, primarily characterized by increased forgetfulness, such as repeating herself during conversation, and word substitutions (replacing âcue tipâ for âclothespinâ)  Elaborating on her forgetfulness, the patient stated that she will have difficulty remembering how to get to familiar locations, or she may have difficulty adjusting in the moment, like when she encounters a detour  Another recent example was when she forgot which debit card her fiance told her to use when shopping  She inadvertently used the wrong card and the couple had to transfer money to a different account to avoid overdraft fees  Ms Geovanny Talavera also noted difficulty with planning, time management, and multitasking, as she acknowledged that she is easily distracted and frequently loses her train of thought  While she was unsure of the progression of the cognitive changes, Ms Geovanny Talavera believes that they have remained relatively stable and that there has not been significant progression over time  Her fiance currently manages the finances, because the patient is concerned that she will âmess things up  â Ms Geovanny Talavera is able to do the shopping, and she uses a system to facilitate medication management  Issues with driving were denied  Ms Geovanny Talavera acknowledged that the pain has severely impacted her quality of life, and she feels that others do not understand what she is experiencing, as they âthink it is just a headache  â The patient is a  by [employer], but she has not been taking orders due to concerns that she will unable to complete them due to not feeling well  Active Problems    1  Cognitive changes (799 59) (R41 89)   2  Depression with anxiety (300 4) (F41 8)   3  Gastric bypass status for obesity (V45 86) (Z98 84)   4  GERD (gastroesophageal reflux disease) (530 81) (K21 9)   5  H/O: hysterectomy (V88 01) (Z98 890,Z90 710)   6  Hypercholesteremia (272 0) (E78 00)   7  Occipital headache (784 0) (R51)   8  Pseudotumor cerebri (348 2) (G93 2)   9  S/P  shunt (V45 2) (Z98 2)   10  Subacromial bursitis, right (726 19) (M75 51)   11  Visit for screening mammogram (V76 12) (Z12 31)    Past Medical History    1  History of obesity (V12 29) (Z86 39)   2  History of visual field defect (V12 49) (Z86 69)   3  History of Other disorders of optic nerve, not elsewhere classified, 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)   (H47 092)   4  History of Papilledema, both eyes (377 00) (H47 10)   5  History of Presence of lumboperitoneal shunt (V45 2) (Z98 2)   6  History of Right rotator cuff tendinitis (726 10) (M75 81)  Active Problems And Past Medical History Reviewed: The active problems and past medical history were reviewed and updated today  Surgical History  Surgical History Reviewed: The surgical history was reviewed and updated today  Social History    · Employed   · Former smoker (P40 01) (M63 332)   · Has 2 children   · High school or GED   · No alcohol use   · No illicit drug use   · Non drinker / no alcohol use   · Sexually active   · Single  Social History Reviewed: The social history was reviewed and updated today  The social history was reviewed and is unchanged  Family History    1  Family history of No significant active problems    2  Family history of No significant active problems    3  Family history of malignant neoplasm of thyroid (V16 8) (Z80 8)    4  Family history of    5  Family history of malignant neoplasm (V16 9) (Z80 9)    6  Family history of Colon cancer   7  Family history of     6  Family history of    5  Family history of malignant neoplasm (V16 9) (Z80 9)  Family History Reviewed: The family history was reviewed and updated today  Current Meds   1  Biotin 1 MG Oral Capsule; 4 gummies daily; Therapy: [id number] to Recorded   2  Butalbital-APAP-Caffeine -40 MG Oral Capsule; TAKE 1 CAPSULE  PRN; Last   Rx:[id number] Ordered   3  Calcium 500-100 MG-UNIT Oral Tablet Chewable; 6 TABLETS DAILY; Therapy: [id number] to Recorded   4  Fiber Select Gummies Oral Tablet Chewable; 2 gummies daily; Therapy: [id number] to Recorded   5  Multivitamins TABS; TAKE 1 TABLET DAILY; Therapy: (Recorded:11Aug2017) to Recorded   6  Nortriptyline HCl - 10 MG Oral Capsule; 1 capsule at bedtime for 1 week then 2 capsules   at bedtime; Therapy: [id number] to (Evaluate:18Apr2018)  Requested for: 17XHR7420; Last   Rx:[id number] Ordered   7  Protonix 40 MG Oral Tablet Delayed Release; TAKE 1 TABLET DAILY; Therapy: (Recorded:11Aug2017) to Recorded   8  Vitamin B-12 1000 MCG Oral Tablet; 1 tablet daily; Therapy: [id number] to Recorded   9  Vitamin D3 2000 UNIT Oral Capsule; 2 CAPSULES DAILY; Therapy: [id number] to Recorded  Medication List Reviewed: The medication list was reviewed today  Allergies    1  Benadryl   2  Phenergan    Psychological and Psychiatric History  As referenced above, when queried about her mood, Ms Jeff Carbajal stated that she often feels invalidated by others, who are unable to empathize or understand what she is experiencing  Current symptoms included decreased motivation and an inability to experience pleasure  Prior to getting ill, Ms Jeff Carbajal denied a history of depression/anxiety  Over the past 2 years, however, there has been depression due to her medical condition; however, other than the nortriptyline, there has been no other psychopharmacological treatment  Ms Jeff Carbajal does participate in group therapy for weight loss and support groups for individuals who have gone through gastric bypass surgery  She noted that the change in her appearance following her surgery has been dramatic, which is associated with increased emotionality  Additionally, Ms Jeff Carbajal perceives others as treating her differently due to her lifestyle changes, and she has even lost a few friends as a result  Alcohol consumption was denied  Ms Keshav Chavez is a former smoker, having quit 5 years ago  Use of illicit substances was denied  Auditory/visual hallucinations were denied  Sleep was described as being âhorribleâ due to maintenance issues, as Ms Keshav Chavez explained that she is very aware of the shunt and feels it when she rolls over in bed  Appetite is good and she has been adhering to her recommended diet  Current pain was rated a 5/10  A history of trauma was denied  Risk/Suicide Assessment  PHQ-9 Depression Scale: Over the past 2 weeks, how often have you been bothered by the following problems? 1 ) Little interest or pleasure in doing things? Half the days or more  2 ) Feeling down, depressed or hopeless? Half the days or more  3 ) Trouble falling asleep or sleeping too much? Several days  4 ) Feeling tired or having little energy? Several days  5 ) Poor appetite or overeating? Not at all    6 ) Feeling bad about yourself, or that you are a failure, or have let yourself or your family down? Several days  7 ) Trouble concentrating on things, such as reading a newspaper or watching television? Not at all    8 ) Moving or speaking so slowly that other people could have noticed, or the opposite, moving or speaking faster than usual? Not at all    9 ) Thoughts that you would be better off dead or of hurting yourself in some way? Not at all  severity of depression is mild   Score 7   Risk Suicide Assessment: Suicidal/homicidal ideation, intent, or plan was denied  The patient was deemed to be at low risk for self-harm or harm to others at this time  Psychosocial History  Ms Keshav Chavez was born in [place], [place], and raised in [place], [place]  She is 1 of 3 children who were raised by their mother  The patient explained that her parents  when she was 5years-old and she does not have a relationship with her biological father   Her mother, who is alive and well, remarried when she was 7 and she had a good relationship with her stepfather throughout her childhood  She also reported supportive relationships with her sister and brother  Ms Chiquita Collado completed 12 years of formal education  She described herself as a âC-student,â and she denied a history of learning difficulty  The patient, however, was required to repeat the 4th grade following her parents' divorce and her family's relocation  Ms Chiquita Collado has been a  and  over the past 20 years, though she is currently not working  She did apply for short-term disability insurance, however, her application was denied and she is in the process of appealing the decision with the assistance of an   Ms Chiquita Collado has 2 children  She was never  but she and her fiance, Carrie Nieto, have been engaged since 2009  She reported having a supportive and stable relationship with [facility]  Ms Chiquita Collado arrived promptly for her appointment and was accompanied by her fiance, Carrie Nieto, who did not participate in the clinical interview  The patient presented as a pleasant, well-groomed, casually-dressed, White woman who appeared her stated chronological age  When asked about her mood, Ms Chiquita Collado stated that she was very anxious before the start of testing  Affect was mood congruent, though at times tearful  Speech was normal in rate, volume, and prosody  Language comprehension appeared to be intact, as Ms Chiquita Collado was able to understand instructions and complete all tasks as they were administered to her  She became visibly upset during [employer], stating âI can't do thisâ¦I can't even concentrate  â The task was eventually discontinued  Similarly, at various points throughout the evaluation, Ms Chiquita Collado noted that she had difficulty concentrating  She demonstrated difficulty on a measure of problem-solving, as her responses were somewhat perseverative   The examiner tested the limits toward the end of the administration  Throughout testing, Ms Alonzo Whitley was hesitant to guess when she was unsure, despite being encouraged by the examiner to do so  She was also overly-apologetic when she encountered perceived difficulty  During the story memory task, the patient had difficulty encoding the information, as it was clear that she was unable to grasp the basic gist of the story  Ms Alonzo Whitley explained that she underwent multiple surgeries on her dominant right hand, which may have adversely impacted her motor performance during testing  Formal and embedded indicators of performance validity were within normal limits, and therefore, the results reported below appeared to be reflective of her current level of functioning  Results/Data  Results Data: Test results are described using commonly accepted ability level classifications from the following source: WechslerJOE  (2008)  Wechsler Adult Intelligence Scale - IV  Barbara COPELAND [facility]  Specifically, Extremely Low range scores are defined as falling below the 2nd percentile  Borderline range scores are defined as falling within the 2nd to the 8th percentile  Low Average range scores are defined as falling within the 9th to the 24th percentile  Average range scores are defined as falling within the 25th to the 74th percentile  High Average range scores are defined as falling within the 75th to the 90th percentile  Superior range scores are defined as falling within the 91st to the 97th percentile  Lastly, Very Superior range scores are defined as falling at or above the 98th percentile  Certain neuropsychological tests are associated with a range of scores that are not normally distributed  That is, scores for most of the normative sample associated with these tests are highly similar, preventing fine levels of differentiation   For such measures, performance labels such as âwithin normal limitsâ or âimpairedâ were used  On a standardized measure of intelligence (WAIS-IV), Ms Nolan's performances across indices were statistically discrepant, and therefore, will be discussed separately  Verbal/conceptual abilities were at the high end of the low average range (VCI: 89; 23rd %ile), while perceptual reasoning abilities were in the borderline range (SANAZ: 77; 6th %ile)  Working memory was in the average range (WMI: 95; 37th %ile), as was her processing speed (PSI: 100; 50th %ile)  Performances on individual subtests will be discussed below  Upper extremity motor speed and fine manual dexterity were in the borderline range with the dominant right hand (1 drop) and in the average range with the nondominant left hand (0 drops)  The expected superiority with the dominant right hand was not observed; however, the reason for this lateralization is likely peripheral in nature, as the patient reported numerous surgeries on the dominant right hand  Immediate auditory attention was in the borderline to high average range  Concentration and working memory, including mental arithmetic, were consistently in the average range  Visuomotor processing speed, when rapidly copying, matching, and/or sequencing symbols, was in the average range across measures (2 errors)  Relative to a demographically-similar peer-group, cognitive flexibility was in the average range though the patient committed 3 set loss errors  Maintenance of cognitive set, in the setting of competing, salient stimulus characteristics, was in the average range and did not appear to be adversely impacted by interference  Verbal abstract reasoning was in the average range, while nonverbal abstract reasoning and pattern analysis were in the low average range  Generative word fluency, when provided with letters by the examiner, was in the average range relative to a demographically-similar peer-group  Novel problem-solving was impaired, as Ms Sabine Sims was only able to complete 1 of 6 categories  She demonstrated marked difficulty incorporating the feedback she received to learn the various sorting principles and meet the changing demands of the task  She committed an excessive number of errors, as a result, most of which were in perseverative in nature  Verbal expression of word meanings was in the average range  Relative to a demographically-similar peer-group, categorical word fluency was in the average range and visual confrontation naming was intact (raw: 56/60; 1/4 phonemic cues)  Several of the missed items did not appear to be in the patient's lexicon  Visuoconstruction of blocks to match a model was in the borderline range  Graphomotor visuoconstruction of a complex geometric figure was below expectations (raw: 28/36)  While Ms Nolan's reproduction approximated the figure, it contained misalignments and minor distortions that resulted in a reduced score  Global auditory memory was in borderline range (AMI: 75; 5th %ile), lower than her global verbal abilities  Immediate recall of auditory information presented in narrative/contextual format was in the borderline range, as the patient had difficulty capturing the basic themes of the stories  Her long-delayed recall of this information, however, was in the low average range, as the patient retained a majority of the information initially encoded (93% retention rate)  Her performance during a recognition trial for this task was reduced (raw: 23/30)  Verbal learning, as assessed by a serial list learning task, was in the borderline range (5, 6, 9, 10, & 9/16)  Immediate recall of items from a distractor list was in the low average range, though consistent with her other single trial learning performance (5/16)   Immediate and long-delayed recall of items from the original wordlist, following the presentation of a distractor list, was in the borderline range (8/16 and 9/16, respectively), with little benefit from cues (10/16 and 11/16, respectively)  Her free recall performances were characterized by a higher number of intrusive errors, some of which were similar to target words  When presented in recognition format, Ms oNlan's ability to accurately discern target items from distractors was intact (hits: 15/16; 0 false positives)  Regarding visual memory, Ms Nolan's immediate recall of geometric figures was in the average range, as was her long-delayed recall performance  Performance during a recognition trial for this task was intact (raw: 6/7)  Ms Evalee Kawasaki also completed an objective, self-report questionnaire of emotional/personality functioning (GOLDEN)  Validity scales suggested that Ms Evalee Kawasaki responded to test items in a consistent and forthright manner, with no evidence of negative or positive impression management  Her clinical profile was characterized by significant elevations across the Somatization (T = 85), Depression (T = 80), and Anxiety (T = 73) scales  The configuration of elevations across clinical scales suggested a person who is reporting significant distress, with particular concerns about her physical functioning  Ms Evalee Kawasaki likely perceives her life as being severely disrupted by a variety of physical symptoms that have left her unhappy, with little energy for concentrating on important life tasks, and with little hope for improvement in the future  Her performance in important social roles has also suffered as a result, which likely has been an additional source of stress  Ms Humphries Terrell responses indicated an unusual degree of concern about physical functioning and health matters, along with probable impairment arising from somatic symptoms  She is likely to report that her daily functioning has been compromised by numerous and varied physical symptoms, which likely include various biological symptoms, including neurological, gastrointestinal, and musculoskeletal systems   The patient is likely to be continuously concerned with her health status and physical problems  In fact, her responses are consistent with profiles seen in both conversion and somatization disorders  Ms Evalee Kawasaki reported a number of difficulties consistent with depression  She is likely to be plagued by thoughts of worthlessness, hopelessness, and personal failure  She acknowledged feeling sad and a loss of interest in normal activities  She also endorsed anhedonia  Ms Evalee Kawasaki also indicated that she is experiencing a discomforting level of anxiety and tension, along with significant worry that may interfere with her ability to concentrate  She is likely to experience difficulty relaxing and likely experiences a high level of fatigue  Ms Humphries Terrell responses indicated that she may experience a mild degree of maladaptive behavior aimed at controlling her anxiety  She may exhibit phobic behaviors that perhaps interfere, to some degree, with her functioning, and she is likely to demonstrate hypervigilance of her environment to avoid contact with feared objects and/or situations  The patient indicated that she is indecisive about many life issues and currently has little sense of direction or purpose  Her self-concept is characterized by a negative self-evaluation  She may fluctuate between periods of self-criticism and self-doubt to self-confidence, likely based on current circumstances  During stressful times, she is susceptible to be self-critical and pessimistic  Interpersonally, Ms Nolan's style is characterized as open, genuine, and conforming  She is likely to be an unassuming individual who prefers to avoid the leadership role in social situations, preferring to be in the background of social settings  She is seen by others as a warm and quiet individual, who is eager to please  The patient denied thoughts of self-harm   While her responses indicated that she acknowledges problems and the need for help, she may be reluctant to consider the possibility that her problems have a psychological contribution  Discussion/Summary    Goldie Ace is a very pleasant, right-handed, 50-year-old, White woman whose medical history is remarkable for pseudotumor cerebri initially treated with a LP shunt, which was removed and replaced by a  shunt by Dr Rosaura Leblanc in May, 2017  The patient has experienced cognitive changes and was referred for a neuropsychological consultation by her neurologist, Dr Sagrario Cortez, for characterization of her neurocognitive and emotional functioning to assist with treatment recommendations  Intellectual functioning was characterized by low average verbal/conceptual abilities and borderline visuoperceptual abilities  Immediate auditory attention fluctuated throughout the evaluation, while concentration and working memory were intact  Visuomotor information processing was generally within normal limits across measures  There was evidence of mild executive dysfunction, characterized by impaired problem-solving, cognitive rigidity, perseveration, and reduced self-monitoring  Response inhibition, abstract reasoning, and generative word fluency were within normal limits  Language abilities appeared well-preserved, while visuoperceptual functions, including visuoconstruction, were impaired  During one of these measures, Ms Kylie Avalos became quite emotional due to the perceived difficulty  Auditory learning and memory were characterized primarily by impaired encoding, though there was some evidence of retrieval difficulty as well  However, there was no evidence of forgetting  The patient exhibited marked difficulty when information was presented in narrative format, as she had difficulty grasping the basic themes of the stories, possibly due to being overwhelmed by the amount of information  Visual learning and memory were generally intact   Upper extremity motor speed and fine manual dexterity were impaired with the dominant right hand, though this was likely secondary due to the patient's history of numerous surgeries on that hand  Her performance with the nondominant left hand was intact  Objective emotional testing revealed that Ms Jeffery Argueta is experiencing significant depression and anxiety, along with concern regarding her physical functioning  It is possible that psychological factors, including her reported symptoms of depression and anxiety, may, in part, contribute to her numerous physical symptoms  In conclusion, the current evaluation revealed neurocognitive dysfunction characterized by fluctuating attention, mild executive dysfunction, visuoperceptual deficits, and impaired encoding of auditory information, with some evidence of retrieve difficulty as well  There was, however, no evidence of forgetting  Ms Jeffery Argueta endorsed chronic pain and discomfort associated with her diagnosis of pseudotumor cerebri that, at times, reaches excruciating levels and severely compromises her functioning  The pain and compromised functioning likely contribute to her significant psychological distress, which then in turn exacerbates her pain and physical symptoms  There is some literature that illustrates cognitive dysfunction in individuals with pseudotumor cerebri (idiopathic intracranial hypertension), with some studies demonstrating persisting dysfunction following the start of treatment (even with improvement in intracranial pressures and headaches)  The pattern of cognitive dysfunction observed in individuals with idiopathic intracranial hypertension is heterogeneous, with studies demonstrating deficits in visuospatial functions, verbal learning and memory, working memory, executive functions, and reaction time, some of which is consistent with Ms Nolan's current presentation   Of course, her chronic pain (along with her pain medication, which was not taken on the day of the evaluation) and psychological distress can serve to compromise her neurocognitive functions even further  The patient currently participates in a weight loss support group, but I think she would benefit from individual psychotherapy to assist in coping with and adjusting to the changes she has experienced over the past several years  Therapy will provide Ms Billy Soria with a safe environment in which she can freely discuss her functional changes and the changes in her self-identity without the fear of judgment, which is especially important as the patient often feels invalidated by those around her who may minimize her current experience  In addition to addressing her current depression and anxiety, psychotherapy may also assist with pain management, which would be an added benefit  She has been scheduled for a neuropsychological feedback session, at which time we will review these findings, clinical impressions, and recommendations  Assessment    1  Pseudotumor cerebri (348 2) (G93 2)   2  Cognitive changes (799 59) (R41 89)   3  Depression with anxiety (300 4) (F41 8)   4  S/P  shunt (V45 2) (Z98 2)    Future Appointments    Date/Time Provider Specialty Site   01/31/2018 09:00 AM Timothy Barnard [facility]  Dustin Stack   02/19/2018 10:00 AM Ivan Tanner DO Internal Medicine [facility]     Signatures   Electronically signed by :  Greta Mckenzie ; Dario 10 2018  8:17AM EST                       (Author)

## 2018-03-07 NOTE — PROGRESS NOTES
Progress Note - General Surgery   Sadie Michaels 36 y o  female MRN: [id number]  Unit/Bed#: [facility] 834-01 Encounter: [id number]    Assessment:  36yF with gastric perforation after hiatal hernia, s/p ex-lap washout, EGD and stent placement x2    - Left drain manipulated by IR yesterday    Plan:  F/u AM CBC  NPO/ice chips  TF @ 70  Prn pain and nausea control  JPs to bulb suction  Midline wound manger  IS/OOB/ambulate  [facility]    Subjective/Objective       Subjective: Increased abdominal pain from drain repositioning  Started to discuss with patient future discharge and need for rehab  Having bowel function  Objective:    Blood pressure 125/71, pulse 93, temperature 99 1 °F (37 3 °C), temperature source Oral, resp  rate 18, height 5' 4" (1 626 m), weight 60 3 kg (132 lb 15 oz), SpO2 97 %, not currently breastfeeding  ,Body mass index is 22 82 kg/m²  Intake/Output Summary (Last 24 hours) at 03/07/18 0900  Last data filed at 03/07/18 0834   Gross per 24 hour   Intake              135 ml   Output             2040 ml   Net            -1905 ml       Invasive Devices     Peripherally Inserted Central Catheter Line            PICC Line 99/59/52 Right Basilic 45 days          Drain            Closed/Suction Drain Midline;Superior Abdomen Other (Comment) 10 Fr  18 days    NG/OG/Enteral Tube Nasogastric 8 Fr Left nares 11 days    Open Drain Midline Abdomen 2 days    Closed/Suction Drain Left;Lateral LUQ Bulb 12 Fr  less than 1 day                Physical Exam:   Gen: NAD, AAOx3  Keofed in place  CV: RRR  Pulm: no resp distress  Abd: Soft, non-distended, appropriately tender  Midline wound manager with clear/white output   JPs w/ white/green output          Results from last 7 days  Lab Units 03/07/18  0540 03/06/18  0454 03/05/18  0434   WBC Thousand/uL 11 23* 9 93 16 03*   HEMOGLOBIN g/dL 9 3* 10 5* 9 0*   HEMATOCRIT % 30 7* 35 3 30 3*   PLATELETS Thousands/uL 421* 376 448*       Results from last 7 days  Lab Units 03/05/18  0434 03/04/18  0432 03/02/18  0558   SODIUM mmol/L 144 142 142   POTASSIUM mmol/L 3 7 3 5 3 6   CHLORIDE mmol/L 104 104 104   CO2 mmol/L 36* 36* 35*   BUN mg/dL 17 17 15   CREATININE mg/dL 0 47* 0 44* 0 47*   GLUCOSE RANDOM mg/dL 181* 186* 153*   CALCIUM mg/dL 9 3 9 1 9 9

## 2018-03-07 NOTE — CASE MANAGEMENT
Continued Stay Review    Date:    3/7/2018    Vital Signs: /71 (BP Location: Left arm)   Pulse 93   Temp 99 1 °F (37 3 °C) (Oral)   Resp 18   Ht 5' 4" (1 626 m)   Wt 60 3 kg (132 lb 15 oz)   LMP  (LMP Unknown)   SpO2 97%   Breastfeeding?  No   BMI 22 82 kg/m²     Medications:   Scheduled Meds:   Current Facility-Administered Medications:  acetaminophen 975 mg Oral Q8H [facility] Hari Hicks PA-C   bisacodyl 10 mg Rectal Daily Polo Zuleta MD   gabapentin 300 mg Oral HS Eleazar Serna MD   heparin (porcine) 5,000 Units Subcutaneous Q12H [facility] Eleazar Serna MD   HYDROmorphone 0 5 mg Intravenous Q1H PRN Polo Zuleta MD   insulin lispro 1-6 Units Subcutaneous Q6H [facility] Earl Holly MD   iohexol 50 mL Oral 90 min pre-procedure Jesise Whipple MD   iohexol 50 mL Oral 90 min pre-procedure Jessie Whipple MD   iohexol 50 mL Oral 60 Min Pre-Op Marciakory Bellamy PA-C   iohexol 50 mL Intravenous Once in imaging Abida Kitchen MD   menthol-methyl salicylate  Apply externally 4x Daily PRN Juanita Corrigan MD   metoprolol 10 mg Intravenous Q6H Eleazar Serna MD   mirtazapine 15 mg Oral HS Adrien Alcala PA-C   nortriptyline 10 mg Oral BID Eleazar Serna MD   ondansetron 4 mg Intravenous Q4H PRN Lauri Potts MD   oxyCODONE 10 mg Oral Q4H PRN Svitlana Melchor PA-C   oxyCODONE 5 mg Oral Q4H PRN Svitlananelly Melchor PA-C   pantoprazole 40 mg Intravenous Q24H [facility] ABNER Tse     Continuous Infusions:    PRN Meds: HYDROmorphone    iohexol    menthol-methyl salicylate    ondansetron    oxyCODONE    oxyCODONE    Abnormal Labs/Diagnostic Results:    WBC    11 23  H/H   9 3/30 7  RBC    3 41    Age/Sex: 36 y o  female     Assessment/Plan:   42yF with gastric perforation after hiatal hernia, s/p ex-lap washout, EGD and stent placement x2     - Left drain manipulated by IR yesterday     Plan:  F/u AM CBC  NPO/ice chips  TF @ 70  Prn pain and nausea control  JPs to bulb suction  Midline wound norma  IS/OOB/ambulate  [facility]    PROGRESS  NOTE    3/6  ID  Postoperative intra-abdominal abscess (HCC)   Assessment & Plan     Due to esophageal anastomotic leak  Status post multiple drains  Cultures with Enterococcus/Saccaromyces  Status post prolonged IV antibiotic/antifungal course  Remains stable off antibiotics     · Continue to follow closely off antibiotics  · Continue drains per surgery  · Follow temperatures closely  · Recheck CBC on Thursday          Esophageal anastomotic leak   Assessment & Plan     Following repair of iatrogenic gastric perforation  Status post stent exchange/replacement  Recent upper GI shows persistent leak     · Patient NPO per surgery  · Per surgery may take several weeks for complete resolution  Repeat imaging on hold as won't change immediate management       Abdominal wound dehiscence   Assessment & Plan     Likely due to subcutaneous abscess  Continues to have significant drainage  Recent tube check shows no evidence of fistulous connection with deeper intraabdominal abscess     · Continue with local wound care as per surgery  · Follow wound drainage closely          Infection of  (ventriculoperitoneal) shunt, subsequent encounter   Assessment & Plan     In the setting of peritonitis  CSF cultures negative and no clinical evidence of meningitis  Status post  shunt resection in 2 weeks IV antibiotics     · Continue to follow closely off antibiotics  · Follow mental status closely     Neutrophilic leukocytosis   Assessment & Plan     Likely multifactorial, largely due to above abscesses  Clinically stable without fever  Remains stable after discontinuation of antibiotics/antifungals     · Continue to follow closely off antibiotics  · Follow temperatures closely  · Recheck CBC on Thursday          * Gastric leak   Assessment & Plan     Iatrogenic in setting of recent hiatal hernia repair  Status post repair              Discharge Plan:    Home    Thank you,  [address] [facility] [address] in the [facility] by Donald Villa for 2017  Network Utilization Review Department  Phone: [phone]; Fax [phone]  ATTENTION: The Network Utilization Review Department is now centralized for our 7 Facilities  Make a note that we have a new phone and fax numbers for our Department  Please call with any questions or concerns to [phone] and carefully follow the prompts so that you are directed to the right person  All voicemails are confidential  Fax any determinations, approvals, denials, and requests for initial or continue stay review clinical to [phone]  Due to HIGH CALL volume, it would be easier if you could please send faxed requests to expedite your requests and in part, help us provide discharge notifications faster

## 2018-03-08 ENCOUNTER — APPOINTMENT (INPATIENT)
Dept: RADIOLOGY | Facility: HOSPITAL | Age: 43
DRG: 711 | End: 2018-03-08
Payer: COMMERCIAL

## 2018-03-08 LAB
GLUCOSE SERPL-MCNC: 145 MG/DL (ref 65–140)
GLUCOSE SERPL-MCNC: 183 MG/DL (ref 65–140)
GLUCOSE SERPL-MCNC: 200 MG/DL (ref 65–140)

## 2018-03-08 PROCEDURE — 97168 OT RE-EVAL EST PLAN CARE: CPT

## 2018-03-08 PROCEDURE — 99024 POSTOP FOLLOW-UP VISIT: CPT | Performed by: SURGERY

## 2018-03-08 PROCEDURE — C9113 INJ PANTOPRAZOLE SODIUM, VIA: HCPCS | Performed by: NURSE PRACTITIONER

## 2018-03-08 PROCEDURE — 97530 THERAPEUTIC ACTIVITIES: CPT

## 2018-03-08 PROCEDURE — 71260 CT THORAX DX C+: CPT

## 2018-03-08 PROCEDURE — G8988 SELF CARE GOAL STATUS: HCPCS

## 2018-03-08 PROCEDURE — 82948 REAGENT STRIP/BLOOD GLUCOSE: CPT

## 2018-03-08 PROCEDURE — G8987 SELF CARE CURRENT STATUS: HCPCS

## 2018-03-08 PROCEDURE — 71046 X-RAY EXAM CHEST 2 VIEWS: CPT

## 2018-03-08 PROCEDURE — 99232 SBSQ HOSP IP/OBS MODERATE 35: CPT | Performed by: INTERNAL MEDICINE

## 2018-03-08 PROCEDURE — 97116 GAIT TRAINING THERAPY: CPT

## 2018-03-08 RX ORDER — DIAZEPAM 5 MG/ML
2.5 INJECTION, SOLUTION INTRAMUSCULAR; INTRAVENOUS EVERY 8 HOURS PRN
Status: DISCONTINUED | OUTPATIENT
Start: 2018-03-08 | End: 2018-03-09 | Stop reason: HOSPADM

## 2018-03-08 RX ORDER — LIDOCAINE 50 MG/G
1 PATCH TOPICAL DAILY
Status: DISCONTINUED | OUTPATIENT
Start: 2018-03-08 | End: 2018-03-09 | Stop reason: HOSPADM

## 2018-03-08 RX ADMIN — IOHEXOL 85 ML: 350 INJECTION, SOLUTION INTRAVENOUS at 18:56

## 2018-03-08 RX ADMIN — HYDROMORPHONE HYDROCHLORIDE 0.5 MG: 1 INJECTION, SOLUTION INTRAMUSCULAR; INTRAVENOUS; SUBCUTANEOUS at 03:08

## 2018-03-08 RX ADMIN — OXYCODONE HYDROCHLORIDE 10 MG: 5 SOLUTION ORAL at 17:23

## 2018-03-08 RX ADMIN — OXYCODONE HYDROCHLORIDE 10 MG: 5 SOLUTION ORAL at 21:45

## 2018-03-08 RX ADMIN — ONDANSETRON 4 MG: 2 INJECTION INTRAMUSCULAR; INTRAVENOUS at 11:26

## 2018-03-08 RX ADMIN — HEPARIN SODIUM 5000 UNITS: 5000 INJECTION, SOLUTION INTRAVENOUS; SUBCUTANEOUS at 20:02

## 2018-03-08 RX ADMIN — INSULIN LISPRO 2 UNITS: 100 INJECTION, SOLUTION INTRAVENOUS; SUBCUTANEOUS at 05:51

## 2018-03-08 RX ADMIN — METOPROLOL TARTRATE 10 MG: 1 INJECTION, SOLUTION INTRAVENOUS at 17:23

## 2018-03-08 RX ADMIN — INSULIN LISPRO 1 UNITS: 100 INJECTION, SOLUTION INTRAVENOUS; SUBCUTANEOUS at 17:24

## 2018-03-08 RX ADMIN — PANTOPRAZOLE SODIUM 40 MG: 40 INJECTION, POWDER, FOR SOLUTION INTRAVENOUS at 08:30

## 2018-03-08 RX ADMIN — OXYCODONE HYDROCHLORIDE 10 MG: 5 SOLUTION ORAL at 05:45

## 2018-03-08 RX ADMIN — HYDROMORPHONE HYDROCHLORIDE 0.5 MG: 1 INJECTION, SOLUTION INTRAMUSCULAR; INTRAVENOUS; SUBCUTANEOUS at 20:02

## 2018-03-08 RX ADMIN — LIDOCAINE 1 PATCH: 50 PATCH TOPICAL at 11:24

## 2018-03-08 RX ADMIN — ONDANSETRON 4 MG: 2 INJECTION INTRAMUSCULAR; INTRAVENOUS at 17:23

## 2018-03-08 RX ADMIN — HEPARIN SODIUM 5000 UNITS: 5000 INJECTION, SOLUTION INTRAVENOUS; SUBCUTANEOUS at 08:30

## 2018-03-08 RX ADMIN — METOPROLOL TARTRATE 10 MG: 1 INJECTION, SOLUTION INTRAVENOUS at 11:24

## 2018-03-08 RX ADMIN — ONDANSETRON 4 MG: 2 INJECTION INTRAMUSCULAR; INTRAVENOUS at 03:08

## 2018-03-08 RX ADMIN — HYDROMORPHONE HYDROCHLORIDE 0.5 MG: 1 INJECTION, SOLUTION INTRAMUSCULAR; INTRAVENOUS; SUBCUTANEOUS at 14:30

## 2018-03-08 RX ADMIN — METOPROLOL TARTRATE 10 MG: 1 INJECTION, SOLUTION INTRAVENOUS at 05:51

## 2018-03-08 RX ADMIN — OXYCODONE HYDROCHLORIDE 10 MG: 5 SOLUTION ORAL at 11:24

## 2018-03-08 NOTE — SOCIAL WORK
[facility] accepted at [facility] and they submitted for insurance auth   Pt set up with [facility] bls for 1:00 auth pending   Cm notified pt , Osvaldo Bernal and pt nurse aware

## 2018-03-08 NOTE — PHYSICAL THERAPY NOTE
Physical Therapy Tx Session:       03/08/18 1164   Pain Assessment   Pain Assessment 0-10   Pain Score 8   Pain Type Acute pain   Pain Location Chest;Abdomen  (upper ABD)   Pain Orientation Upper;Bilateral   Hospital Pain Intervention(s) Repositioned; Ambulation/increased activity; Elevated; Emotional support; Environmental changes; Rest   Restrictions/Precautions   Other Precautions Multiple lines; Fall Risk;Pain  (slow mobility)   General   Chart Reviewed Yes   Family/Caregiver Present (sister in law and mother)   Cognition   Overall Cognitive Status WFL   Arousal/Participation Alert; Cooperative   Attention Within functional limits   Orientation Level Oriented X4   Following Commands Follows one step commands with increased time or repetition  (slow mobility and pain)   Bed Mobility   Rolling L 3  Moderate assistance   Additional items Assist x 1;HOB elevated; Bedrails; Increased time required;Verbal cues;LE management   Supine to Sit 3  Moderate assistance   Additional items Assist x 1;HOB elevated; Bedrails; Increased time required;Verbal cues;LE management   Transfers   Sit to Stand 4  Minimal assistance   Additional items Assist x 1;Bedrails; Increased time required;Verbal cues   Stand to Sit 4  Minimal assistance   Additional items Assist x 1; Armrests; Increased time required;Verbal cues   Ambulation/Elevation   Gait pattern Poor UE support; Improper Weight shift; Antalgic;Narrow SCARLETT; Forward Flexion; Inconsistent bree; Foward flexed; Short stride; Ataxia; Excessively slow   Gait Assistance 5  Supervision   Additional items Assist x 1;Verbal cues; Tactile cues   Assistive Device Rolling walker   Distance 100 feet with use of RW on various surfaces;slow mobility   Balance   Static Sitting Fair   Dynamic Sitting Poor +   Static Standing Poor +   Dynamic Standing Fair   Ambulatory Fair   Endurance Deficit   Endurance Deficit Yes   Endurance Deficit Description slow mobility,pain,deconditioning   Activity Tolerance   Activity Tolerance Patient limited by fatigue;Patient limited by pain  (fair)   Nurse Made Aware yes   Equipment Use   Comments pt declined BLE ther ex at this time 2* inc pain   Assessment   Prognosis Good   Problem List Decreased strength;Decreased endurance; Impaired balance;Decreased mobility; Decreased safety awareness;Decreased skin integrity;Pain   Assessment pt able to ambulate 100 feet with seated rest break and an additional 100 feet with use of RW on various surfaces needing S level of A  Pt able to perform transfers minAx1, L rolling modAx1 and BM modAx1  Pt fatigues quickly and cont to report pain upper ABD and lower chest area at rest and during mobility  Multiple IV lines and medicine management with pt receiving pain meds prior to mobility per NSG Cammy Torres)  Pt would cont to benefit from skilled inpt PT services to maximize functional independence   Goals   Patient Goals to return to bed   LTG Expiration Date 03/19/18   Plan   Treatment/Interventions Functional transfer training; Endurance training;Patient/family training;Equipment eval/education; Bed mobility;Gait training;Spoke to nursing;Spoke to case management; Family   Progress Slow progress, medical status limitations   PT Frequency 5x/wk   Recommendation   Recommendation Post acute IP rehab   Equipment Recommended Walker  (RW)   Skilled PT recommended while in hospital and upon DC to progress pt toward treatment goals

## 2018-03-08 NOTE — PROGRESS NOTES
Patient care rounds were completed with the patient's nurse today, Bhavik Melchor     We discussed the plan is to continue NPO and her in feeding tube for enteral nutrition  Continue current drains and possibly discussed with IR for re-evaluation of her left flank drain  Continue off antibiotics and anticipate potential discharge on 03/09/2018 pending final read on chest x-ray today  We reviewed all of the invasive devices/lines/telemetry orders   - Right-sided PICC line; anticipate discontinuation prior to discharge tomorrow  Pain Assessment / Plan:  - Continue current analgesic regimen  Mobility Assessment / Plan:  - Continue PT and OT evaluation and treatment as indicated with activity as tolerated  Goals / Barriers for discharge:  - Anticipate discharge tomorrow to rehab  - Case management following  All questions and concerns were addressed  I spent greater than 15 minutes reviewing the plan with the patient and the nurse, and coordinating her care for the day      Stas Leija PA-C  3/8/2018 10:27 AM

## 2018-03-08 NOTE — PROGRESS NOTES
Progress Note - Infectious Disease   Trice Nava 36 y o  female MRN: [id number]  Unit/Bed#: [facility] 834-01 Encounter: [id number]      Impression/Recommendations:  Postoperative intra-abdominal abscess [facility])   Assessment & Plan    Due to esophageal anastomotic leak  Status post multiple drains  Cultures with Enterococcus/Saccaromyces  Status post prolonged IV antibiotic/antifungal course  Remains stable off antibiotics    · Continue to follow closely off antibiotics  · Continue drains per surgery  · Follow temperatures closely  · Given clinical stability can recheck CBC PRN        Esophageal anastomotic leak   Assessment & Plan    Following repair of iatrogenic gastric perforation  Status post stent exchange/replacement  Recent upper GI shows persistent leak    · Patient NPO per surgery  · Per surgery may take several weeks for complete resolution  Repeat imaging on hold as won't change immediate management          Abdominal wound dehiscence   Assessment & Plan    Likely due to subcutaneous abscess  Continues to have significant drainage  Recent tube check shows no evidence of fistulous connection with deeper intraabdominal abscess    · Continue with local wound care as per surgery  · Follow wound drainage closely        Infection of  (ventriculoperitoneal) shunt, subsequent encounter   Assessment & Plan    In the setting of peritonitis  CSF cultures negative and no clinical evidence of meningitis  Status post  shunt resection in 2 weeks IV antibiotics    · Continue to follow closely off antibiotics  · Follow mental status closely        Neutrophilic leukocytosis   Assessment & Plan    Likely multifactorial, largely due to above abscesses  Clinically stable without fever; improved  Remains stable after discontinuation of antibiotics/antifungals    · Continue to follow closely off antibiotics  · Follow temperatures closely  · Given clinically stability can check CBC PRN        * Gastric leak   Assessment & Plan Iatrogenic in setting of recent hiatal hernia repair  Status post repair        Left-sided chest wall pain   Assessment & Plan    Positional nature suspects likely due to subphrenic drain  Now status post drain exchange  Low suspicion for pneumonia or PE    · Patient repositioning PRN  · Monitor symptoms closely  · Monitor drain output closely per primary service        The patient is stable from an ID standpoint  We will sign off for now  Please call with new questions  Antibiotics:  Off antibiotics #5  POD # 41  Postdrainage # 19    Subjective:  Patient seen on AM rounds  Denies fevers, chills, or sweats  Denies nausea, vomiting, or diarrhea  Continues to have left-sided chest wall pain related to drain     24 Hour Events:  No documented fevers, chills, sweats, nausea, vomiting, or diarrhea  Had exchange of partially obstructed left upper quadrant catheter on     Objective:  Vitals:  HR:  [88-94] 88  Resp:  [16] 16  BP: (127-146)/(72-83) 127/73  SpO2:  [95 %-96 %] 95 %  Temp (24hrs), Av 4 °F (36 9 °C), Min:97 9 °F (36 6 °C), Max:99 2 °F (37 3 °C)  Current: Temperature: 98 1 °F (36 7 °C)    Physical Exam:   General:  No acute distress  Psychiatric:  Awake and alert  Pulmonary:  Normal respiratory excursion without accessory muscle use  Abdomen:  Soft, nontender, significant amount of purulent drainage noted in wound manager and midline ADENIKE drain bulb  Subphrenic drain with minimal clear fluid  Extremities:  No edema  Skin:  No rashes    Lab Results:  I have personally reviewed pertinent labs      Results from last 7 days  Lab Units 18  0434 18  0432 18  0558   SODIUM mmol/L 144 142 142   POTASSIUM mmol/L 3 7 3 5 3 6   CHLORIDE mmol/L 104 104 104   CO2 mmol/L 36* 36* 35*   ANION GAP mmol/L 4 2* 3*   BUN mg/dL 17 17 15   CREATININE mg/dL 0 47* 0 44* 0 47*   EGFR ml/min/1 73sq m 121 124 121   GLUCOSE RANDOM mg/dL 181* 186* 153*   CALCIUM mg/dL 9 3 9 1 9 9       Results from last 7 days  Lab Units 03/07/18  0540 03/06/18  0454 03/05/18  0434   WBC Thousand/uL 11 23* 9 93 16 03*   HEMOGLOBIN g/dL 9 3* 10 5* 9 0*   PLATELETS Thousands/uL 421* 376 448*           Imaging Studies:   I have personally reviewed pertinent imaging study reports and images in PACS  EKG, Pathology, and Other Studies:   I have personally reviewed pertinent reports

## 2018-03-08 NOTE — ASSESSMENT & PLAN NOTE
1/23 Percutaneous drain placement - IR  1/24 EGD with stent - [facility] (Dr Prosper Palacio from [facility])  1/25  shunt externalization - Silvana  1/25 Exploratory laparotomy, abdominal washout, placement of abdominal drains x4, postpyloric keofed placement - Romero  1/31 Bilateral chest tube placement, postpyloric, keofed placement, drainage of LUQ abscess - IR  2/5 Removal of  shunt - OMichael (NSg)  2/5 Removal of left and right chest tubes - Burfeind  2/12 Aspiration of retroperitoneal collection - IR  2/16 RUQ and RLQ drains removed at bedside - Vitaly Posadas  2/16 IR drain placement - IR  2/23 EGD with removal of esophageal stent and replacement with new esophageal stent - Juliet  2/23 EGD with second esophageal stent placement - Burfeind   3/1 Tube check - IR

## 2018-03-08 NOTE — PLAN OF CARE
Problem: OCCUPATIONAL THERAPY ADULT  Goal: Performs self-care activities at highest level of function for planned discharge setting  See evaluation for individualized goals  Treatment Interventions: ADL retraining, Functional transfer training, Endurance training, Patient/family training, Equipment evaluation/education, Compensatory technique education, Activityengagement          See flowsheet documentation for full assessment, interventions and recommendations  Outcome: Progressing  Limitation: Decreased ADL status, Decreased self-care trans, Decreased high-level ADLs, Decreased endurance  Prognosis: Good  Assessment: Pt is a 37 y/o female seen for OT re-evaluation  Pt originally admitted to [facility] on 1/24/18 for esophageal stent placement 2* gastric perforation  Upon initial evaluation, pt required max/total assist for ADLs and was unable to perform sit <-> stand transfer  Pt reported that her pain is still high (8/10) since admission, however she notes that her mobility/transfers has gotten much better  Pt currently requires mod assist for overall ADLs and min Ax1 for functional mobility/transfers w/ RW  Pt required cuing for RW safety  Educated pt on the role of OT, energy conservation techniques, and activity modifications  Pt verbalized understanding  Pt has progressed since initial evaluation, however she still presents w/ limitations from her baseline including decreased activity tolerance, decreased endurance, decreased standing bal/eleonora, decreased sitting bal/eleonora, decreased ability to indp and safety complete all baseline ADLs, risk for falls, pain, and generalized weakness  Rec short term rehab upon d/c  Pt would benefit from cont skilled OT services to address below stated goals     Recommendation:  (PT MAY BENEFIT FROM NEUROPSYCH CONSULT )  OT Discharge Recommendation: Short Term Rehab  OT - OK to Discharge: Yes (when medically clear to STR)

## 2018-03-08 NOTE — OCCUPATIONAL THERAPY NOTE
[address] Re-Evaluation     Patient Name: Nuvia Warren  IXIIR'A Date: 3/8/2018  Problem List  Patient Active Problem List   Diagnosis    Gastric leak    Acute peritonitis    Idiopathic intracranial hypertension    S/P  shunt    Infection of  (ventriculoperitoneal) shunt, subsequent encounter    Murmur, cardiac    Refusal of blood product    Gastroesophageal reflux disease    Choroidal nevus, right eye    Moderate protein-calorie malnutrition ([address] )    Esophageal anastomotic leak    Postoperative intra-abdominal abscess (HCC)    Abdominal wound dehiscence    Neutrophilic leukocytosis    Left-sided chest wall pain     Past Medical History  History reviewed  No pertinent past medical history  Past Surgical History  Past Surgical History:   Procedure Laterality Date    ESOPHAGOGASTRODUODENOSCOPY N/A 2/23/2018    Procedure: ESOPHAGOGASTRODUODENOSCOPY (EGD) WITH ESOPHAGEAL STENT PLACEMENT;  Surgeon: Nirmal Manning MD;  [facility];  Service: Thoracic    ESOPHAGOGASTRODUODENOSCOPY N/A 2/23/2018    Procedure: ESOPHAGOGASTRODUODENOSCOPY (EGD) WITH REMOVAL ESOPHAGEAL STENT  AND REPLACEMENT WITH 23mm X155mm 1111 [address] [place] [address],[address];  Surgeon: Nirmal Manning MD;  [facility];  Service: Thoracic    LAPAROTOMY N/A 1/25/2018    Procedure: Exploratory Laparotomy, wash out,placement of drains, placement of NG feeding tube ;   Surgeon: Demarcsu Varela DO;  [facility];  Service: General    TN Diana Najjar CSF SHUNT,W/O REPLACE Right 2/5/2018    Procedure: Removal of  shunt;  Surgeon: Marvin Mckenna MD;  [facility];  Service: Neurosurgery    TN REPLACEMENT/REVISION,CSF SHUNT Right 1/25/2018    Procedure: Externalization of right-sided SHUNT VENTRICULAR-PERITONEAL in anterior chest wall ribs two and three level  ;  Surgeon: Jitendra Horn MD;  [facility];  Service: Neurosurgery         03/08/18 1120   Note Type   Note type Re-eval Restrictions/Precautions   Weight Bearing Precautions Per Order No   Other Precautions Fall Risk;Pain;Multiple lines   Pain Assessment   Pain Assessment 0-10   Pain Score 8   Pain Location [facility]   Hospital Pain Intervention(s) Repositioned; Emotional support   Response to Interventions tolerated    Home Living   Type of [facility] One level   Bathroom Shower/Tub Tub/shower unit   Bathroom Toilet Standard   Bathroom Accessibility Accessible   Prior Function   Level of [place] Independent with ADLs and functional mobility   Lives With Son   Receives Help From Family   ADL Assistance Independent   IADLs Independent   Falls in the last 6 months 0   Vocational Unemployed   Lifestyle   Autonomy I PTA in ADLs, IADLs, driving, and homemaking  Reciprocal Relationships Lives w/ s/o and son    Service to Others Not working   Intrinsic Gratification Time w/ family    Psychosocial   Psychosocial (WDL) WDL   ADL   Where Assessed Chair   Eating Assistance Unable to assess  (NPO)   Eating Deficit NPO   Grooming Assistance 5  Supervision/Setup   UB Bathing Assistance 3  Moderate Assistance   LB Bathing Assistance 2  Maximal Assistance   UB Dressing Assistance 3  Moderate Assistance   LB Dressing Assistance 2  Maximal [address]  3  Moderate Assistance   Functional Assistance 3  Moderate Assistance   Bed Mobility   Sit to Supine 4  Minimal assistance   Additional items Assist x 1;LE management   Transfers   Sit to Stand 4  Minimal assistance   Additional items Assist x 1; Increased time required   Stand to Sit 4  Minimal assistance   Additional items Assist x 1; Increased time required   Stand pivot 4  Minimal assistance   Additional items Assist x 1   Functional Mobility   Additional items Rolling walker   Balance   Static Sitting Fair +   Dynamic Sitting Fair   Static Standing Poor +   Dynamic Standing Poor +   Activity Tolerance   Activity Tolerance Patient limited by pain; Patient limited by fatigue   Nurse Made Aware Okay to see per RN, [employer]   RUE Assessment   RUE Assessment X   LUE Assessment   LUE Assessment X   Hand Function   Gross Motor Coordination Functional   Fine Motor Coordination Functional   Cognition   Overall Cognitive Status WFL   Arousal/Participation Alert; Responsive; Cooperative   Attention Within functional limits   Orientation Level Oriented X4   Memory Within functional limits   Following Commands Follows one step commands without difficulty   Comments Pt agreeable to therapy, however in pain and would like to transfer from chair to bed    Assessment   Limitation Decreased ADL status; Decreased self-care trans;Decreased high-level ADLs; Decreased endurance   Prognosis Good   Assessment Pt is a 35 y/o female seen for OT re-evaluation  Pt [facility] for esophageal stent placement 2* gastric perforation  Upon initial evaluation, pt required max/total assist for ADLs and was unable to perform sit <-> stand transfer  Pt reported that her pain is still high (8/10) since admission, however she notes that her mobility/transfers has gotten much better  Pt currently requires mod assist for overall ADLs and min Ax1 for functional mobility/transfers w/ RW  Pt required cuing for RW safety  Educated pt on the role of OT, energy conservation techniques, and activity modifications  Pt verbalized understanding  Pt has progressed since initial evaluation, however she still presents w/ limitations from her baseline including decreased activity tolerance, decreased endurance, decreased standing bal/eleonora, decreased sitting bal/eleonora, decreased ability to indp and safety complete all baseline ADLs, risk for falls, pain, and generalized weakness  Rec short term rehab upon d/c  Pt would benefit from cont skilled OT services to address below stated goals      Goals   Patient Goals to return to bed    LTG Time Frame 7-10   Long Term Goal see below goals    Plan   Treatment Interventions ADL retraining;Functional transfer training; Endurance training;Patient/family training;Equipment evaluation/education; Compensatory technique education; Energy conservation   Goal Expiration Date 03/18/18   OT Frequency 3-5x/wk   Recommendation   OT Discharge Recommendation Short Term Rehab   OT - OK to Discharge Yes  (when medically clear to STR)   Barthel Index   Feeding 0   Bathing 0   Grooming Score 0   Dressing Score 5   Bladder Score 10   Bowels Score 10   Toilet Use Score 5   Transfers (Bed/Chair) Score 10   Mobility (Level Surface) Score 0   Stairs Score 0   Barthel Index Score 40   Modified [place] Scale   Modified [place] Scale 4     Pt will perform all ADL's at mod I level with G balance and DME/AE as needed      Pt will perform functional transfers, including toilet transfer, at mod I level w/ use of DME/AE as needed       Pt will perform functional mobility at a mod I level w/ use of DME and G balance      Pt will demonstrate good carry over of RW safety and energy conservation techniques      Pt will demonstrate good carry over of pt/family education and training w/ 100% attention to task to assist w/ safe d/c planning      Pt will improve activity tolerance to G for 30 min treatment session      Pt will tolerate standing at sink for 8-10 minutes w/ G balance and endurance for grooming activity        Justino Rhoades OTR/L

## 2018-03-08 NOTE — ASSESSMENT & PLAN NOTE
Malnutrition Findings:   Malnutrition type: Acute illness (Pt w/ moderate malnutrition due to gastric leak/decreased intake as evidenced by slightly dark orbitals and depressed temples treated with enteral nutrition support currently at goal)  Degree of Malnutrition: Malnutrition of moderate degree  BMI Findings: Body mass index is 22 82 kg/m²     Continue tube feeds as tolerated

## 2018-03-08 NOTE — PLAN OF CARE
Problem: PHYSICAL THERAPY ADULT  Goal: Performs mobility at highest level of function for planned discharge setting  See evaluation for individualized goals  Treatment/Interventions: Functional transfer training, LE strengthening/ROM, Therapeutic exercise, Endurance training, Bed mobility, Spoke to nursing  Equipment Recommended:  (R/O LEAST RESTRICTIVE AD )       See flowsheet documentation for full assessment, interventions and recommendations  Outcome: Progressing  Prognosis: Good  Problem List: Decreased strength, Decreased endurance, Impaired balance, Decreased mobility, Decreased safety awareness, Decreased skin integrity, Pain  Assessment: pt able to ambulate 100 feet with seated rest break and an additional 100 feet with use of RW on various surfaces needing S level of A  Pt able to perform transfers minAx1, L rolling modAx1 and BM modAx1  Pt fatigues quickly and cont to report pain upper ABD and lower chest area at rest and during mobility  Multiple IV lines and medicine management with pt receiving pain meds prior to mobility per NSG Lex Landeros)  Pt would cont to benefit from skilled inpt PT services to maximize functional independence  Barriers to Discharge: Inaccessible home environment     Recommendation: Post acute IP rehab     PT - OK to Discharge: (S) Yes (TO INPT REHAB WHEN MED FREDERIC )    See flowsheet documentation for full assessment

## 2018-03-08 NOTE — PROGRESS NOTES
Progress Note - General Surgery   Amara Bullion 36 y o  female MRN: [id number]  Unit/Bed#: [facility] 834-01 Encounter: [id number]    Assessment/Plan:    Postoperative intra-abdominal abscess [facility])   Assessment & Plan    Off antibiotics        Moderate protein-calorie malnutrition ([address] )   Assessment & Plan    Malnutrition Findings:   Malnutrition type: Acute illness (Pt w/ moderate malnutrition due to gastric leak/decreased intake as evidenced by slightly dark orbitals and depressed temples treated with enteral nutrition support currently at goal)  Degree of Malnutrition: Malnutrition of moderate degree  BMI Findings: Body mass index is 22 82 kg/m²  Continue tube feeds as tolerated          * Gastric leak   Assessment & Plan    1/23 Percutaneous drain placement - IR  1/24 EGD with stent - [facility] (Dr Primitivo Kuo from [facility])  1/25  shunt externalization - Silvana  1/25 Exploratory laparotomy, abdominal washout, placement of abdominal drains x4, postpyloric keofed placement - Romero  1/31 Bilateral chest tube placement, postpyloric, keofed placement, drainage of LUQ abscess - IR  2/5 Removal of  shunt - OMichael (NSg)  2/5 Removal of left and right chest tubes - Burfeind  2/12 Aspiration of retroperitoneal collection - IR  2/16 RUQ and RLQ drains removed at bedside - Rosa Amezcua  2/16 IR drain placement - IR  2/23 EGD with removal of esophageal stent and replacement with new esophageal stent - Burfeind  2/23 EGD with second esophageal stent placement - Burfeind   3/1 Tube check - IR                Subjective/Objective     Subjective: Patient reports L chest pain  Reports that pain "takes my breath away "      Objective:     Vitals: Blood pressure 146/78, pulse 88, temperature 97 9 °F (36 6 °C), temperature source Axillary, resp  rate 16, height 5' 4" (1 626 m), weight 60 3 kg (132 lb 15 oz), SpO2 95 %, not currently breastfeeding  ,Body mass index is 22 82 kg/m²      I/O       03/06 4672 - 03/07 0700 03/07 0701 - 03/08 0700    P  O  0     NG/ 135    Total Intake(mL/kg) 270 (4 5) 135 (2 2)    Urine (mL/kg/hr) 1850 (1 3) 1650 (1 1)    Drains 240 (0 2) 125 (0 1)    Stool 0 (0) 0 (0)    Total Output 2090 1775    Net -1820 -1640          Unmeasured Urine Occurrence 1 x 450 x    Unmeasured Stool Occurrence 1 x 0 x          Physical Exam:  GEN: NAD  HEENT: MMM  CV: RRR  Lung: Normal effort  Ab: Soft, NT/ND, midline wound manager with milky white drainage, ADENIKE x2 empty  Extrem: No CCE  Neuro:  A+Ox3    Lab, Imaging and other studies: CBC with diff: No results found for: WBC, HGB, HCT, MCV, PLT, ADJUSTEDWBC, MCH, MCHC, RDW, MPV, NRBC, BMP/CMP: No results found for: NA, K, CL, CO2, ANIONGAP, BUN, CREATININE, GLUCOSE, CALCIUM, AST, ALT, ALKPHOS, PROT, ALBUMIN, BILITOT, EGFR, Magnesium: No results found for: MAG  VTE Pharmacologic Prophylaxis: Heparin  VTE Mechanical Prophylaxis: sequential compression device

## 2018-03-09 VITALS
WEIGHT: 141.76 LBS | RESPIRATION RATE: 18 BRPM | DIASTOLIC BLOOD PRESSURE: 78 MMHG | HEART RATE: 87 BPM | HEIGHT: 64 IN | SYSTOLIC BLOOD PRESSURE: 126 MMHG | BODY MASS INDEX: 24.2 KG/M2 | TEMPERATURE: 97.8 F | OXYGEN SATURATION: 97 %

## 2018-03-09 LAB
GLUCOSE SERPL-MCNC: 172 MG/DL (ref 65–140)
GLUCOSE SERPL-MCNC: 175 MG/DL (ref 65–140)
GLUCOSE SERPL-MCNC: 175 MG/DL (ref 65–140)

## 2018-03-09 PROCEDURE — 82948 REAGENT STRIP/BLOOD GLUCOSE: CPT

## 2018-03-09 PROCEDURE — 99024 POSTOP FOLLOW-UP VISIT: CPT | Performed by: SURGERY

## 2018-03-09 PROCEDURE — C9113 INJ PANTOPRAZOLE SODIUM, VIA: HCPCS | Performed by: NURSE PRACTITIONER

## 2018-03-09 RX ORDER — MIRTAZAPINE 15 MG/1
15 TABLET, ORALLY DISINTEGRATING ORAL
Refills: 0
Start: 2018-03-09 | End: 2018-04-13 | Stop reason: HOSPADM

## 2018-03-09 RX ORDER — ACETAMINOPHEN 160 MG/5ML
975 SUSPENSION, ORAL (FINAL DOSE FORM) ORAL EVERY 8 HOURS SCHEDULED
Qty: 118 ML | Refills: 0 | Status: SHIPPED | OUTPATIENT
Start: 2018-03-09 | End: 2018-04-13 | Stop reason: HOSPADM

## 2018-03-09 RX ORDER — OXYCODONE HCL 5 MG/5 ML
5-10 SOLUTION, ORAL ORAL EVERY 4 HOURS PRN
Qty: 180 ML | Refills: 0 | Status: SHIPPED | OUTPATIENT
Start: 2018-03-09 | End: 2018-03-19

## 2018-03-09 RX ORDER — MUSCLE RUB CREAM 100; 150 MG/G; MG/G
CREAM TOPICAL 4 TIMES DAILY PRN
Refills: 0
Start: 2018-03-09 | End: 2018-04-13 | Stop reason: HOSPADM

## 2018-03-09 RX ORDER — LIDOCAINE 50 MG/G
1 PATCH TOPICAL DAILY
Qty: 30 PATCH | Refills: 0 | Status: SHIPPED | OUTPATIENT
Start: 2018-03-10 | End: 2018-04-13 | Stop reason: HOSPADM

## 2018-03-09 RX ADMIN — HYDROMORPHONE HYDROCHLORIDE 0.5 MG: 1 INJECTION, SOLUTION INTRAMUSCULAR; INTRAVENOUS; SUBCUTANEOUS at 00:32

## 2018-03-09 RX ADMIN — HEPARIN SODIUM 5000 UNITS: 5000 INJECTION, SOLUTION INTRAVENOUS; SUBCUTANEOUS at 09:01

## 2018-03-09 RX ADMIN — INSULIN LISPRO 1 UNITS: 100 INJECTION, SOLUTION INTRAVENOUS; SUBCUTANEOUS at 05:57

## 2018-03-09 RX ADMIN — INSULIN LISPRO 1 UNITS: 100 INJECTION, SOLUTION INTRAVENOUS; SUBCUTANEOUS at 00:29

## 2018-03-09 RX ADMIN — OXYCODONE HYDROCHLORIDE 10 MG: 5 SOLUTION ORAL at 11:20

## 2018-03-09 RX ADMIN — ONDANSETRON 4 MG: 2 INJECTION INTRAMUSCULAR; INTRAVENOUS at 00:32

## 2018-03-09 RX ADMIN — INSULIN LISPRO 1 UNITS: 100 INJECTION, SOLUTION INTRAVENOUS; SUBCUTANEOUS at 11:32

## 2018-03-09 RX ADMIN — METOPROLOL TARTRATE 10 MG: 1 INJECTION, SOLUTION INTRAVENOUS at 00:28

## 2018-03-09 RX ADMIN — METOPROLOL TARTRATE 10 MG: 1 INJECTION, SOLUTION INTRAVENOUS at 05:55

## 2018-03-09 RX ADMIN — METOPROLOL TARTRATE 10 MG: 1 INJECTION, SOLUTION INTRAVENOUS at 11:20

## 2018-03-09 RX ADMIN — HYDROMORPHONE HYDROCHLORIDE 0.5 MG: 1 INJECTION, SOLUTION INTRAMUSCULAR; INTRAVENOUS; SUBCUTANEOUS at 13:02

## 2018-03-09 RX ADMIN — PANTOPRAZOLE SODIUM 40 MG: 40 INJECTION, POWDER, FOR SOLUTION INTRAVENOUS at 09:01

## 2018-03-09 RX ADMIN — OXYCODONE HYDROCHLORIDE 10 MG: 5 SOLUTION ORAL at 02:31

## 2018-03-09 RX ADMIN — HYDROMORPHONE HYDROCHLORIDE 0.5 MG: 1 INJECTION, SOLUTION INTRAMUSCULAR; INTRAVENOUS; SUBCUTANEOUS at 08:47

## 2018-03-09 RX ADMIN — ONDANSETRON 4 MG: 2 INJECTION INTRAMUSCULAR; INTRAVENOUS at 05:47

## 2018-03-09 RX ADMIN — LIDOCAINE 1 PATCH: 50 PATCH TOPICAL at 09:01

## 2018-03-09 RX ADMIN — HYDROMORPHONE HYDROCHLORIDE 0.5 MG: 1 INJECTION, SOLUTION INTRAMUSCULAR; INTRAVENOUS; SUBCUTANEOUS at 05:47

## 2018-03-09 NOTE — PROGRESS NOTES
Called [facility] to [facility] and spoke with Montserrat Wilson nurse provided number for any further questions or concerns

## 2018-03-09 NOTE — SOCIAL WORK
Cm reviewed patient  Patient is ready for discharge today to [facility]  Auth # is [id number]  Approved for 7 days  Fax is [phone] and report is [phone]  Patient to transport at [place] [address]  Cm following  All parties aware and in agreement with patient's discharge plan

## 2018-03-09 NOTE — DISCHARGE INSTRUCTIONS
Acute Care Surgery and Thoracic Surgery Discharge Instructions    Please follow-up as instructed  If you do not already have a follow-up appointment, please call the office when you leave to schedule an appointment to be seen in 2-3 weeks for post-operative re-evaluation  Activity:  - Continue PT and OT evaluation and treatment as indicated  - No lifting greater than 20 pounds or strenuous physical activity or exercise for at least 4 weeks  - Walking and normal light activities are encouraged  - Normal daily activities including climbing steps are okay  - No driving until no longer using pain medications  Diet:    - You should remain NPO (nothing by mouth) except for a few ice chips  - Continue naso-enteric tube feeds with Jevity 1 2 at 70 mL per hour continuously  Please provide 135 mL free water flushes every 6 hours  Wound Care:  - Please continue the IR drains with care as outlined below  - Please continue to manage lower midline abdominal wound with a wound manager / ostomy appliance to control and measure output  - May shower daily  No tub baths or swimming until cleared by your surgeon   - Wash incision gently with soap and water and pat dry  - Do not apply any creams or ointments unless instructed to do so by your surgeon  Medications:    - You may resume all of your regular medications, including blood thinners and aspirin, after going home unless otherwise instructed  Please refer to your discharge medication list for further details  - Please take the pain medications as directed  The Lidocaine patch is for use at her left flank drain site  - You may become constipated, especially if taking pain medications  You may take any over the counter stool softeners or laxatives as needed  Examples: Milk of Magnesia, Colace, Senna  Additional Instructions:  - You should have a follow-up Chest X-ray (PA and lateral)  This should be performed in approximately 4 weeks (week of 4/9/2018)  Please complete 2-3 days prior to Thoracic Surgery follow-up appointment  - If you have any questions or concerns after discharge please call the office   - Call office or return to ER if fever greater than 101, chills, persistent nausea/vomiting, worsening/uncontrollable pain, and/or increasing redness or purulent/foul smelling drainage from incision(s)  TUBE CARE INSTRUCTIONS    Care after your procedure:    Resume your normal diet  Small sips of flat soda will help with nausea  1  The properly functioning catheter should be forward flushed once (1x) daily with 10ml of normal saline using clean technique  You will be given a prescription for flushes  To flush the tube, clean both connections with alcohol swab  Twist off the drainage bag/ bulb  tubing and twist the saline syringe into the drainage tube and flush  Remove the syringe and twist the drainage bag / bulb tubing tubing back on     2  The drainage bag/bulb may be emptied as necessary  Keep a record of the amount of fluid you drain from your tube  This should be done with clean technique as well  3  A fresh dressing should be applied daily over the tube insertion site  4  As the tube is secured to the skin with only a suture,try not to pull on your tube  Tub baths are not permitted  Showers are permitted if the patient's skin entry site is prevented from getting wet  Similarly, washcloth "baths" are acceptable  Contact Interventional Radiology at [phone] Liam PATIENTS: Contact Interventional Radiology at [phone]) Heydi Ramos PATIENTS: Contact Interventional Radiology at [phone]) if:    1  Leakage or large amounts of liquid around the catheter  2  Fever of 101 degrees lasting several hours without other obvious cause (such as sore throat, flu, etc)  3  Persistent nausea or vomiting  4  Diminished drainage, which may be associated with pressure or pain   Or when the     drainage from your tube is less than 10mls for 48 hours  5  Catheter pulled back or falls out  The following pharmacies carry the flush syringes  [facility]                     [facility]  [address]  Phone [phone]            Phone [phone] [address]             [address]                                [phone] [address] [facility] Brennen [place]LE                      [address] [facility]  Phone [phone]            Phone [phone]                      Mallika Redding                                                                                                          [phone]  [facility]  [facility] [address]    [address]  Phone [phone]

## 2018-03-09 NOTE — PROGRESS NOTES
Progress Note - General Surgery   Rachelle Anthony 36 y o  female MRN: [id number]  Unit/Bed#: [facility] 834-01 Encounter: [id number]    Assessment:  36yF with gastric perforation after hiatal hernia, s/p ex-lap washout, EGD and stent placement x2    CT chest yesterday revealing no cavitary lesion in the lung    Plan:  NPO/ice chips  Cont tube feeds  JPs to bulb suction  Midline wound manger  IS/OOB/ambulate  Bowel regimen  SQH  Poss dispo today    Subjective/Objective       Subjective:   Having bowel function     Objective:    Blood pressure 139/79, pulse 99, temperature 97 9 °F (36 6 °C), temperature source Oral, resp  rate 16, height 5' 4" (1 626 m), weight 60 3 kg (132 lb 15 oz), SpO2 97 %, not currently breastfeeding  ,Body mass index is 22 82 kg/m²  Intake/Output Summary (Last 24 hours) at 03/09/18 0558  Last data filed at 03/09/18 0245   Gross per 24 hour   Intake             1525 ml   Output             2055 ml   Net             -530 ml       Invasive Devices     Peripherally Inserted Central Catheter Line            PICC Line 19/21/90 Right Basilic 47 days          Drain            Closed/Suction Drain Midline;Superior Abdomen Other (Comment) 10 Fr  20 days    NG/OG/Enteral Tube Nasogastric 8 Fr Left nares 13 days    Open Drain Midline Abdomen 4 days    Closed/Suction Drain Left;Lateral LUQ Bulb 12 Fr  2 days                Physical Exam:   Gen: NAD, AAOx3  Keofed in place  CV: RRR  Pulm: no resp distress  Abd: Soft, non-distended, appropriately tender  Midline wound manager with clear/white output   JPs w/ white/green output          Results from last 7 days  Lab Units 03/07/18  0540 03/06/18  0454 03/05/18  0434   WBC Thousand/uL 11 23* 9 93 16 03*   HEMOGLOBIN g/dL 9 3* 10 5* 9 0*   HEMATOCRIT % 30 7* 35 3 30 3*   PLATELETS Thousands/uL 421* 376 448*       Results from last 7 days  Lab Units 03/05/18  0434 03/04/18  0432   SODIUM mmol/L 144 142   POTASSIUM mmol/L 3 7 3 5   CHLORIDE mmol/L 104 104   CO2 mmol/L 36* 36*   BUN mg/dL 17 17   CREATININE mg/dL 0 47* 0 44*   GLUCOSE RANDOM mg/dL 181* 186*   CALCIUM mg/dL 9 3 9 1

## 2018-03-09 NOTE — DISCHARGE SUMMARY
Discharge Summary - General Surgery   Johnathon Pair 36 y o  female MRN: [id number]  Unit/Bed#: [facility] 834-01 Encounter: [id number]    Admission Date: 1/24/2018     Discharge Date: 3/9/2018     Admitting Diagnosis: Gastric leak [K91 89]    Discharge Diagnosis:     1  Gastric perforation and leak  2    shunt infection, resolved  3   Acute kidney injury, resolved  4   Multiple intra-abdominal abscesses  Attending and Service: [facility]  Consulting Physician(s):     1   Dr Nati Arizmendi, Infectious Disease  2   Dr Neptali Jessica [facility]  3   Dr Rhoda Davis, Minimally Invasive and Bariatric Surgery  4   Dr Eduard Love, Thoracic Surgery  5   Dr Diana Link, Ophthalmology  6   Dr Ifeoma Lux, Anesthesia and Acute Pain Service  7   Dr Allen Garcia, [address] [place] [address]  Imaging and Procedures Performed:   Orders Placed This Encounter   Procedures    Laceration repair     1  Hiatal hernia repair on 01/11/2018 by   at [facility]     2   Exploratory laparotomy with repair of gastric perforation on 01/14/2018 at [facility]     3   Percutaneous drain placement on 01/23/2018 by Interventional Radiology  4   EGD with gastroesophageal stent placement on 01/24/2018 by Dr Radha Young  5    shunt externalization on 01/25/2018 by Dr Aleks Wong  6   Exploratory laparotomy, abdominal washout, placement of abdominal drains x4, placement of post pyloric nasogastric feeding tube on 01/25/2018 by Dr Kusum Reed  7   Bilateral chest tube insertion, placement of post pyloric nasogastric feeding to, and drainage of left upper quadrant abscess on 01/31/2018 by Interventional Radiology  8   Removal of  shunt on 02/05/2018 by Dr Salty Dias  9   Removal of left and right chest tubes on 02/05/2018 by Dr Eduard Love  10   Aspiration of retroperitoneal air and fluid collection on 02/12/2018 by Interventional Radiology      11   Percutaneous drain placement on 02/16/2018 by Interventional Radiology  12  EGD with removal of previous esophageal stent replacement with new esophageal stent on 02/23/2018 by Dr Benson Cranker  13   Repeat EGD with placement of 2nd esophageal stent on 02/23/2018 by Dr Benson Cranker  14   Abdominal drain check on 03/01/2018 by Interventional Radiology  [facility] Course: Rachelle Anthony is a 25-year-old female who presented to [facility] as a transfer from [facility] with sepsis  She was noted to have previously undergone a gastric sleeve procedure in 2016 for morbid obesity  She has a chronic history of a  shunt which was most recently revised in June of 2017  She underwent an elective hiatal hernia repair on 01/11/2018 at [facility] and subsequently developed peritonitis and septic shock secondary to a gastric perforation  She returned to the OR on 01/14/2018 at [facility] for attempted repair of her gastric perforation  During the procedure, she had placement of multiple intra-abdominal drains and had been initiated on broad-spectrum IV antibiotics  On postoperative day 9 , her workup revealed a large fluid collection which required percutaneous drain placement  She then underwent an upper GI series that demonstrated a leak around the GE junction  She was subsequently transferred to [facility] for ICU care and attempted esophageal stenting  She arrived to the ICU at [facility] intubated, but was able to be extubated  She was thoroughly re-evaluated by the [facility] team, continued on broad-spectrum IV antibiotics with a consult placed to Infectious Disease  She was continued on IV fluid resuscitation for an acute kidney injury present on her transfer  All of her drains were continued as well as IV Protonix  A CT scan of her head was obtained as well as a  shunt series with a consult placed to Neurosurgery      Following transfer to [facility], she underwent an EGD with esophageal stent placement on 01/24/2018  The Neurosurgery service determined that her  shunt should be externalized and this was completed on 01/25/2018  Repeat imaging with a CT scan of abdomen pelvis demonstrated multiple abdominal collections, and the decision was made by the [facility] Surgical service to re-explore her abdomen  She returned to the OR on 01/25/2018 and underwent an exploratory laparotomy, abdominal washout, placement of multiple abdominal drains and a post pyloric nasogastric feeding tube  She had a prolonged ICU course with additional procedures as noted above  She was provided enteral nutrition when able and did also receive parenteral nutrition until tolerating enteral nutrition well  The infectious disease service managed her antibiotic regimen throughout her hospitalization  Once stable from a critical care standpoint, she was transferred to a step-down bed where she continued her inpatient management  Despite multiple attempts to place stents over and around her perforation, barium swallow studies demonstrated a persistent small leak  Due to the persistent nature of her leak, she remained NPO with the nasoenteric feeding tube to maintain her nutrition  She was able to tolerate goal tube feeds and had adequate normal bowel function  She remained stable for nearly 1 week following completion of her IV antibiotic course and from her last procedure  PT and OT were following her throughout the admission and recommended inpatient rehab when medically appropriate  Case Management assisted in arranging discharge to a skilled nursing rehab on 03/09/2018  On discharge, she is instructed to follow-up with her primary care provider in the next one month to review the events of her recent hospitalization  She was instructed to follow up with thoracic surgery in approximately 4 weeks with a repeat chest x-ray 2-3 days prior to her appointment    She was instructed to follow up with Dr Tiffany Newberry once her stents are removed  She is instructed to follow-up with the [facility] on an as-needed basis and may call with questions/concerns, and/or for an appointment as needed  She should remain NPO except for a few ice chips and be continued on her nasoenteric tube feeding  She should have routine care of her 2 remaining IR drains as well as local wound care for draining midline abdominal wound  She should follow the remaining discharge instructions  Condition at Discharge: good     Discharge instructions/Information to patient and family:   See after visit summary for information provided to patient and family  Provisions for Follow-Up Care:  See after visit summary for information related to follow-up care and any pertinent home health orders  Disposition: See After Visit Summary for discharge disposition information  Planned Readmission: Yes; she will need additional future management of her gastric leak  Discharge Statement   I spent 45 minutes discharging the patient  This time was spent on the day of discharge  I had direct contact with the patient on the day of discharge  Additional documentation is required if more than 30 minutes were spent on discharge  Additional time was spent on this discharge due to the complexity of her hospitalization  Discharge Medications:  See after visit summary for reconciled discharge medications provided to patient and family  I performed a search on the [address] website on this patient for past prescriptions of controlled substances      June Ch PA-C  3/9/2018  3:46 PM

## 2018-03-12 LAB — FUNGUS SPEC CULT: NORMAL

## 2018-03-13 NOTE — PROGRESS NOTES
03/20/2018-PER EVELYN (RAD Md), CT HEAD APPROVED, AUTH#[id number]  VALID 03/20/2018-05/19/2018  [facility] PHONE#[phone], SPOKE  [address] FOR CT TO BE SCHEDULED AT [facility]  PER Pt, REQUESTED APPT IN April, 6 WK POV APPT RESCHEDULED FOR 04/10/2018      03/16/2018-CALLED FACILITY, SPOKE TO RASHEEDA, WHO STATES DOCTOR AT FACILITY IS AWARE OF CT NEEDED PRIOR TO APPT AND WILL CALL OUR OFFICE BACK ONCE CT IS SCHEDULED SO WE CAN CHECK FOR PRE-AUTH     03/15/2018-CALLED FACILITY, SPOKE TO RASHEEDA, WHO CONFIRMED THAT THEY RECEIVED SCRIPT AND NPP THAT WAS FAXED  RASHEEDA EXPLAINED THAT SHE HAD TO HAVE ONE OF HER DOCTORS AT THE FACILITY WRITE THERE OWN SCRIPT FOR PT TO COMPLETE SCAN  RASHEEDA WAS REMIINDED TO CALL OFFICE BACK WITH TIME AND DATE OF STUDY TO CHECK ON PRECERT     86/93/4362-[facility] FACILITY, SPOKE TO RASHEEDA, WHO STATES THEY DID NOT RECEIVE PAPERWORK OR SCRIPT  REFAXED NPP AND CT SCRIPT TO FACILITY ATTENTION: RASHEEDA  RASHEEDA WAS TOLD TO CALL OUR OFFICE ONCE CT IS SCHEDULED SO WE CAN CHECK ON PRIOR AUTHORIZATION       03/13/2018-[facility]ED [facility] AT PHONE#[phone], SPOKE TO FREDA AND CONFIRMED 03/22/2018 APPT WITH CT  FAXED CT SCRIPT & NPP TO FACILITY TO FYD#[phone]  Polo Batres ADVISED TO CALL OUR OFFICE BACK WITH DATE & TIME OF CT TO CHECK ON PRIOR Argelia Gregorio

## 2018-03-14 NOTE — CASE MANAGEMENT
Notification of Discharge  This is a Notification of Discharge from our facility [address]  Please be advised that this patient has been discharge from our facility  Below you will find the admission and discharge date and time including the patients disposition  PRESENTATION DATE: 1/24/2018  6:11 PM  IP ADMISSION DATE: 1/24/18 1811  DISCHARGE DATE: 3/9/2018  1:26 PM  DISPOSITION: Geovanny Loya 18  [facility] in the [facility] by Donald Villa for 2017  Network Utilization Review Department  Phone: [phone]; Fax [phone]  ATTENTION: The Network Utilization Review Department is now centralized for our 7 Facilities  Make a note that we have a new phone and fax numbers for our Department  Please call with any questions or concerns to [phone] and carefully follow the prompts so that you are directed to the right person  All voicemails are confidential  Fax any determinations, approvals, denials, and requests for initial or continue stay review clinical to [phone]  Due to HIGH CALL volume, it would be easier if you could please send faxed requests to expedite your requests and in part, help us provide discharge notifications faster

## 2018-03-20 LAB
ANION GAP SERPL CALCULATED.3IONS-SCNC: 8.6 MM/L
BUN SERPL-MCNC: 22 MG/DL (ref 7–25)
CALCIUM SERPL-MCNC: 10.3 MG/DL (ref 8.6–10.5)
CHLORIDE SERPL-SCNC: 97 MM/L (ref 98–107)
CO2 SERPL-SCNC: 33 MM/L (ref 21–31)
CREAT SERPL-MCNC: 0.43 MG/DL (ref 0.6–1.2)
EGFR (HISTORICAL): > 60 GFR
EGFR AFRICAN AMERICAN (HISTORICAL): > 60 GFR
GLUCOSE (HISTORICAL): 144 MG/DL (ref 65–99)
OSMOLALITY, SERUM (HISTORICAL): 276 MOSM (ref 262–291)
POTASSIUM SERPL-SCNC: 3.6 MM/L (ref 3.5–5.5)
SODIUM SERPL-SCNC: 135 MM/L (ref 134–143)

## 2018-03-21 LAB
ANION GAP SERPL CALCULATED.3IONS-SCNC: 8.7 MM/L
BUN SERPL-MCNC: 24 MG/DL (ref 7–25)
CALCIUM SERPL-MCNC: 10.6 MG/DL (ref 8.6–10.5)
CHLORIDE SERPL-SCNC: 98 MM/L (ref 98–107)
CO2 SERPL-SCNC: 33 MM/L (ref 21–31)
CREAT SERPL-MCNC: 0.43 MG/DL (ref 0.6–1.2)
EGFR (HISTORICAL): > 60 GFR
EGFR AFRICAN AMERICAN (HISTORICAL): > 60 GFR
GLUCOSE (HISTORICAL): 140 MG/DL (ref 65–99)
OSMOLALITY, SERUM (HISTORICAL): 278 MOSM (ref 262–291)
POTASSIUM SERPL-SCNC: 3.7 MM/L (ref 3.5–5.5)
SODIUM SERPL-SCNC: 136 MM/L (ref 134–143)

## 2018-03-22 ENCOUNTER — HOSPITAL ENCOUNTER (INPATIENT)
Facility: HOSPITAL | Age: 43
LOS: 22 days | Discharge: HOME HEALTH | DRG: 252 | End: 2018-04-13
Attending: THORACIC SURGERY (CARDIOTHORACIC VASCULAR SURGERY) | Admitting: THORACIC SURGERY (CARDIOTHORACIC VASCULAR SURGERY)
Payer: COMMERCIAL

## 2018-03-22 DIAGNOSIS — K25.5 GASTRIC PERFORATION (HCC): Primary | ICD-10-CM

## 2018-03-22 DIAGNOSIS — E44.0 MODERATE PROTEIN-CALORIE MALNUTRITION (HCC): ICD-10-CM

## 2018-03-22 DIAGNOSIS — K91.89 GASTRIC LEAK: ICD-10-CM

## 2018-03-22 LAB
ANION GAP SERPL CALCULATED.3IONS-SCNC: 8.5 MM/L
BASOPHILS # BLD AUTO: 0.04 THOUSANDS/ΜL (ref 0–0.1)
BASOPHILS NFR BLD AUTO: 0 % (ref 0–1)
BUN SERPL-MCNC: 26 MG/DL (ref 7–25)
CALCIUM SERPL-MCNC: 10.7 MG/DL (ref 8.6–10.5)
CHLORIDE SERPL-SCNC: 104 MM/L (ref 98–107)
CO2 SERPL-SCNC: 33 MM/L (ref 21–31)
CREAT SERPL-MCNC: 0.49 MG/DL (ref 0.6–1.2)
EGFR (HISTORICAL): > 60 GFR
EGFR AFRICAN AMERICAN (HISTORICAL): > 60 GFR
EOSINOPHIL # BLD AUTO: 0.21 THOUSAND/ΜL (ref 0–0.61)
EOSINOPHIL NFR BLD AUTO: 2 % (ref 0–6)
ERYTHROCYTE [DISTWIDTH] IN BLOOD BY AUTOMATED COUNT: 16.4 % (ref 11.6–15.1)
GLUCOSE (HISTORICAL): 138 MG/DL (ref 65–99)
HCT VFR BLD AUTO: 32.3 % (ref 34.8–46.1)
HGB BLD-MCNC: 10.3 G/DL (ref 11.5–15.4)
LYMPHOCYTES # BLD AUTO: 2.15 THOUSANDS/ΜL (ref 0.6–4.47)
LYMPHOCYTES NFR BLD AUTO: 18 % (ref 14–44)
MCH RBC QN AUTO: 27.5 PG (ref 26.8–34.3)
MCHC RBC AUTO-ENTMCNC: 31.9 G/DL (ref 31.4–37.4)
MCV RBC AUTO: 86 FL (ref 82–98)
MONOCYTES # BLD AUTO: 0.87 THOUSAND/ΜL (ref 0.17–1.22)
MONOCYTES NFR BLD AUTO: 7 % (ref 4–12)
NEUTROPHILS # BLD AUTO: 8.81 THOUSANDS/ΜL (ref 1.85–7.62)
NEUTS SEG NFR BLD AUTO: 73 % (ref 43–75)
NRBC BLD AUTO-RTO: 0 /100 WBCS
OSMOLALITY, SERUM (HISTORICAL): 290 MOSM (ref 262–291)
PLATELET # BLD AUTO: 226 THOUSANDS/UL (ref 149–390)
PMV BLD AUTO: 9.6 FL (ref 8.9–12.7)
POTASSIUM SERPL-SCNC: 3.5 MM/L (ref 3.5–5.5)
RBC # BLD AUTO: 3.74 MILLION/UL (ref 3.81–5.12)
SODIUM SERPL-SCNC: 142 MM/L (ref 134–143)
WBC # BLD AUTO: 12.12 THOUSAND/UL (ref 4.31–10.16)

## 2018-03-22 PROCEDURE — 85025 COMPLETE CBC W/AUTO DIFF WBC: CPT | Performed by: STUDENT IN AN ORGANIZED HEALTH CARE EDUCATION/TRAINING PROGRAM

## 2018-03-22 PROCEDURE — 83735 ASSAY OF MAGNESIUM: CPT | Performed by: STUDENT IN AN ORGANIZED HEALTH CARE EDUCATION/TRAINING PROGRAM

## 2018-03-22 PROCEDURE — 80048 BASIC METABOLIC PNL TOTAL CA: CPT | Performed by: STUDENT IN AN ORGANIZED HEALTH CARE EDUCATION/TRAINING PROGRAM

## 2018-03-22 RX ORDER — HEPARIN SODIUM 5000 [USP'U]/ML
5000 INJECTION, SOLUTION INTRAVENOUS; SUBCUTANEOUS EVERY 8 HOURS SCHEDULED
Status: DISCONTINUED | OUTPATIENT
Start: 2018-03-22 | End: 2018-04-13 | Stop reason: HOSPADM

## 2018-03-22 RX ORDER — SODIUM CHLORIDE 9 MG/ML
100 INJECTION, SOLUTION INTRAVENOUS CONTINUOUS
Status: DISCONTINUED | OUTPATIENT
Start: 2018-03-22 | End: 2018-03-22

## 2018-03-22 RX ORDER — DEXTROSE MONOHYDRATE 100 MG/ML
60 INJECTION, SOLUTION INTRAVENOUS CONTINUOUS
Status: DISCONTINUED | OUTPATIENT
Start: 2018-03-22 | End: 2018-03-24

## 2018-03-22 RX ORDER — ONDANSETRON 2 MG/ML
4 INJECTION INTRAMUSCULAR; INTRAVENOUS EVERY 6 HOURS PRN
Status: DISCONTINUED | OUTPATIENT
Start: 2018-03-22 | End: 2018-04-13 | Stop reason: HOSPADM

## 2018-03-22 RX ADMIN — HEPARIN SODIUM 5000 UNITS: 5000 INJECTION, SOLUTION INTRAVENOUS; SUBCUTANEOUS at 23:26

## 2018-03-22 RX ADMIN — ONDANSETRON 4 MG: 2 INJECTION INTRAMUSCULAR; INTRAVENOUS at 23:43

## 2018-03-22 RX ADMIN — DEXTROSE MONOHYDRATE 100 ML/HR: 100 INJECTION, SOLUTION INTRAVENOUS at 23:21

## 2018-03-23 ENCOUNTER — APPOINTMENT (INPATIENT)
Dept: RADIOLOGY | Facility: HOSPITAL | Age: 43
DRG: 252 | End: 2018-03-23
Payer: COMMERCIAL

## 2018-03-23 PROBLEM — K25.5 GASTRIC PERFORATION (HCC): Status: ACTIVE | Noted: 2018-01-24

## 2018-03-23 LAB
ANION GAP SERPL CALCULATED.3IONS-SCNC: 5 MMOL/L (ref 4–13)
ANION GAP SERPL CALCULATED.3IONS-SCNC: 6 MMOL/L (ref 4–13)
BASOPHILS # BLD AUTO: 0.04 THOUSANDS/ΜL (ref 0–0.1)
BASOPHILS NFR BLD AUTO: 1 % (ref 0–1)
BUN SERPL-MCNC: 19 MG/DL (ref 5–25)
BUN SERPL-MCNC: 21 MG/DL (ref 5–25)
CALCIUM SERPL-MCNC: 10.2 MG/DL (ref 8.3–10.1)
CALCIUM SERPL-MCNC: 10.4 MG/DL (ref 8.3–10.1)
CHLORIDE SERPL-SCNC: 101 MMOL/L (ref 100–108)
CHLORIDE SERPL-SCNC: 101 MMOL/L (ref 100–108)
CO2 SERPL-SCNC: 31 MMOL/L (ref 21–32)
CO2 SERPL-SCNC: 32 MMOL/L (ref 21–32)
CREAT SERPL-MCNC: 0.44 MG/DL (ref 0.6–1.3)
CREAT SERPL-MCNC: 0.49 MG/DL (ref 0.6–1.3)
EOSINOPHIL # BLD AUTO: 0.13 THOUSAND/ΜL (ref 0–0.61)
EOSINOPHIL NFR BLD AUTO: 2 % (ref 0–6)
ERYTHROCYTE [DISTWIDTH] IN BLOOD BY AUTOMATED COUNT: 16.4 % (ref 11.6–15.1)
GFR SERPL CREATININE-BSD FRML MDRD: 120 ML/MIN/1.73SQ M
GFR SERPL CREATININE-BSD FRML MDRD: 124 ML/MIN/1.73SQ M
GLUCOSE SERPL-MCNC: 115 MG/DL (ref 65–140)
GLUCOSE SERPL-MCNC: 128 MG/DL (ref 65–140)
GLUCOSE SERPL-MCNC: 129 MG/DL (ref 65–140)
GLUCOSE SERPL-MCNC: 76 MG/DL (ref 65–140)
GLUCOSE SERPL-MCNC: 92 MG/DL (ref 65–140)
GLUCOSE SERPL-MCNC: 92 MG/DL (ref 65–140)
HCT VFR BLD AUTO: 31.7 % (ref 34.8–46.1)
HGB BLD-MCNC: 10.1 G/DL (ref 11.5–15.4)
LYMPHOCYTES # BLD AUTO: 2.01 THOUSANDS/ΜL (ref 0.6–4.47)
LYMPHOCYTES NFR BLD AUTO: 23 % (ref 14–44)
MAGNESIUM SERPL-MCNC: 2.1 MG/DL (ref 1.6–2.6)
MAGNESIUM SERPL-MCNC: 2.1 MG/DL (ref 1.6–2.6)
MCH RBC QN AUTO: 27.7 PG (ref 26.8–34.3)
MCHC RBC AUTO-ENTMCNC: 31.9 G/DL (ref 31.4–37.4)
MCV RBC AUTO: 87 FL (ref 82–98)
MONOCYTES # BLD AUTO: 0.82 THOUSAND/ΜL (ref 0.17–1.22)
MONOCYTES NFR BLD AUTO: 10 % (ref 4–12)
NEUTROPHILS # BLD AUTO: 5.59 THOUSANDS/ΜL (ref 1.85–7.62)
NEUTS SEG NFR BLD AUTO: 64 % (ref 43–75)
NRBC BLD AUTO-RTO: 0 /100 WBCS
PLATELET # BLD AUTO: 233 THOUSANDS/UL (ref 149–390)
PMV BLD AUTO: 9.9 FL (ref 8.9–12.7)
POTASSIUM SERPL-SCNC: 3.1 MMOL/L (ref 3.5–5.3)
POTASSIUM SERPL-SCNC: 3.5 MMOL/L (ref 3.5–5.3)
RBC # BLD AUTO: 3.65 MILLION/UL (ref 3.81–5.12)
SODIUM SERPL-SCNC: 138 MMOL/L (ref 136–145)
SODIUM SERPL-SCNC: 138 MMOL/L (ref 136–145)
WBC # BLD AUTO: 8.62 THOUSAND/UL (ref 4.31–10.16)

## 2018-03-23 PROCEDURE — 85025 COMPLETE CBC W/AUTO DIFF WBC: CPT | Performed by: STUDENT IN AN ORGANIZED HEALTH CARE EDUCATION/TRAINING PROGRAM

## 2018-03-23 PROCEDURE — 99222 1ST HOSP IP/OBS MODERATE 55: CPT | Performed by: THORACIC SURGERY (CARDIOTHORACIC VASCULAR SURGERY)

## 2018-03-23 PROCEDURE — 82948 REAGENT STRIP/BLOOD GLUCOSE: CPT

## 2018-03-23 PROCEDURE — 74177 CT ABD & PELVIS W/CONTRAST: CPT

## 2018-03-23 PROCEDURE — 71260 CT THORAX DX C+: CPT

## 2018-03-23 PROCEDURE — 83735 ASSAY OF MAGNESIUM: CPT | Performed by: STUDENT IN AN ORGANIZED HEALTH CARE EDUCATION/TRAINING PROGRAM

## 2018-03-23 PROCEDURE — C9113 INJ PANTOPRAZOLE SODIUM, VIA: HCPCS | Performed by: SURGERY

## 2018-03-23 PROCEDURE — 80048 BASIC METABOLIC PNL TOTAL CA: CPT | Performed by: STUDENT IN AN ORGANIZED HEALTH CARE EDUCATION/TRAINING PROGRAM

## 2018-03-23 RX ORDER — POTASSIUM CHLORIDE 14.9 MG/ML
20 INJECTION INTRAVENOUS
Status: COMPLETED | OUTPATIENT
Start: 2018-03-23 | End: 2018-03-25

## 2018-03-23 RX ORDER — PANTOPRAZOLE SODIUM 40 MG/1
40 INJECTION, POWDER, FOR SOLUTION INTRAVENOUS
Status: DISCONTINUED | OUTPATIENT
Start: 2018-03-23 | End: 2018-04-13 | Stop reason: HOSPADM

## 2018-03-23 RX ORDER — LORAZEPAM 2 MG/ML
0.5 INJECTION INTRAMUSCULAR EVERY 6 HOURS PRN
Status: DISCONTINUED | OUTPATIENT
Start: 2018-03-23 | End: 2018-04-13 | Stop reason: HOSPADM

## 2018-03-23 RX ORDER — POTASSIUM CHLORIDE 14.9 MG/ML
20 INJECTION INTRAVENOUS ONCE
Status: COMPLETED | OUTPATIENT
Start: 2018-03-23 | End: 2018-03-26

## 2018-03-23 RX ORDER — POTASSIUM CHLORIDE 29.8 MG/ML
40 INJECTION INTRAVENOUS ONCE
Status: DISCONTINUED | OUTPATIENT
Start: 2018-03-23 | End: 2018-03-23

## 2018-03-23 RX ADMIN — LORAZEPAM 0.5 MG: 2 INJECTION INTRAMUSCULAR; INTRAVENOUS at 21:01

## 2018-03-23 RX ADMIN — ONDANSETRON 4 MG: 2 INJECTION INTRAMUSCULAR; INTRAVENOUS at 22:50

## 2018-03-23 RX ADMIN — HEPARIN SODIUM 5000 UNITS: 5000 INJECTION, SOLUTION INTRAVENOUS; SUBCUTANEOUS at 22:52

## 2018-03-23 RX ADMIN — IOHEXOL 50 ML: 240 INJECTION, SOLUTION INTRATHECAL; INTRAVASCULAR; INTRAVENOUS; ORAL at 15:49

## 2018-03-23 RX ADMIN — HEPARIN SODIUM 5000 UNITS: 5000 INJECTION, SOLUTION INTRAVENOUS; SUBCUTANEOUS at 05:51

## 2018-03-23 RX ADMIN — HYDROMORPHONE HYDROCHLORIDE 0.5 MG: 1 INJECTION, SOLUTION INTRAMUSCULAR; INTRAVENOUS; SUBCUTANEOUS at 18:46

## 2018-03-23 RX ADMIN — DEXTROSE MONOHYDRATE 100 ML/HR: 100 INJECTION, SOLUTION INTRAVENOUS at 04:17

## 2018-03-23 RX ADMIN — ONDANSETRON 4 MG: 2 INJECTION INTRAMUSCULAR; INTRAVENOUS at 05:37

## 2018-03-23 RX ADMIN — HEPARIN SODIUM 5000 UNITS: 5000 INJECTION, SOLUTION INTRAVENOUS; SUBCUTANEOUS at 14:24

## 2018-03-23 RX ADMIN — PANTOPRAZOLE SODIUM 40 MG: 40 INJECTION, POWDER, FOR SOLUTION INTRAVENOUS at 11:04

## 2018-03-23 RX ADMIN — IOHEXOL 100 ML: 350 INJECTION, SOLUTION INTRAVENOUS at 18:13

## 2018-03-23 RX ADMIN — HYDROMORPHONE HYDROCHLORIDE 0.5 MG: 1 INJECTION, SOLUTION INTRAMUSCULAR; INTRAVENOUS; SUBCUTANEOUS at 14:35

## 2018-03-23 RX ADMIN — HYDROMORPHONE HYDROCHLORIDE 1 MG: 1 INJECTION, SOLUTION INTRAMUSCULAR; INTRAVENOUS; SUBCUTANEOUS at 10:59

## 2018-03-23 RX ADMIN — HYDROMORPHONE HYDROCHLORIDE 1 MG: 1 INJECTION, SOLUTION INTRAMUSCULAR; INTRAVENOUS; SUBCUTANEOUS at 22:50

## 2018-03-23 RX ADMIN — POTASSIUM CHLORIDE 20 MEQ: 200 INJECTION, SOLUTION INTRAVENOUS at 12:53

## 2018-03-23 RX ADMIN — POTASSIUM CHLORIDE 20 MEQ: 200 INJECTION, SOLUTION INTRAVENOUS at 10:50

## 2018-03-23 RX ADMIN — DEXTROSE MONOHYDRATE 60 ML/HR: 100 INJECTION, SOLUTION INTRAVENOUS at 16:43

## 2018-03-23 RX ADMIN — LORAZEPAM 0.5 MG: 2 INJECTION INTRAMUSCULAR; INTRAVENOUS at 14:14

## 2018-03-23 RX ADMIN — ONDANSETRON 4 MG: 2 INJECTION INTRAMUSCULAR; INTRAVENOUS at 14:24

## 2018-03-23 RX ADMIN — CALCIUM GLUCONATE: 94 INJECTION, SOLUTION INTRAVENOUS at 22:52

## 2018-03-23 NOTE — RESTORATIVE TECHNICIAN NOTE
Restorative Specialist Mobility Note       Activity: Ambulate in fuchs, Chair     Assistive Device: Other (Comment) (pushed IV pole)

## 2018-03-23 NOTE — PLAN OF CARE
Problem: DISCHARGE PLANNING - CARE MANAGEMENT  Goal: Discharge to post-acute care or home with appropriate resources  INTERVENTIONS:  - Conduct assessment to determine patient/family and health care team treatment goals, and need for post-acute services based on payer coverage, community resources, and patient preferences, and barriers to discharge  - Address psychosocial, clinical, and financial barriers to discharge as identified in assessment in conjunction with the patient/family and health care team  - Arrange appropriate level of post-acute services according to patient's   needs and preference and payer coverage in collaboration with the physician and health care team  - Communicate with and update the patient/family, physician, and health care team regarding progress on the discharge plan  - Arrange appropriate transportation to post-acute venues  - Return to [facility] and complete rehab before returning home  Outcome: Progressing

## 2018-03-23 NOTE — PROGRESS NOTES
Spoke with white surgery resident, pt having pain and has no pain medication on board, also addressed CT scan and contrast, PO contrast ordered, pt has been NPO and NG tube was removed, talk overnight of possible placement of new NG or IV contrast  Will order pain medication and no news on contrast wait for Attending to round

## 2018-03-23 NOTE — SOCIAL WORK
MCG Guide Used for Initial Round: gastric perforation/bleed  Optimal GLOS: 2  Hospital Day: 1 day  DC Readiness:   Goal Length of Stay: Ambulatory or 2 days   Note: Goal Length of Stay assumes optimal recovery, decision making, and care  Patients may be discharged to a lower level of care (either later than or sooner than the goal) when it is appropriate for their clinical status and care needs  Discharge Readiness  Return to top of Gastrointestinal Bleeding, Upper RRG - ISC   Discharge readiness is indicated by patient meeting Recovery Milestones, including ALL of the following:   Hypotension absent   Surgical and other acute interventions not needed   Bleeding absent   Stable Hgb/Hct   Pain absent or managed   Mental status at baseline   Ambulatory   Oral hydration, medications, and diet   Discharge plans and education understood    Identified Barriers: hx gastric bypass, intraabdominal abscess, chronic TPN, multiple drains, chronic malnutrition, wound dehiscence, s/p  shunt  Recurrent, obscure, or persistent bleeding or continued Hemodynamic instability   Anticipate further diagnostic testing (eg, repeat endoscopy, colonoscopy, small bowel enteroscopy, capsule endoscopy, bleeding scan, hybrid SPECT/CT scan, angiogram, CT angiogram, CT enterography, CT enteroclysis)  Anticipate continued parenteral fluid and transfusion support, repeat therapeutic endoscopy, and either catheter-directed or surgical intervention if endoscopic therapy fails  Expect brief to moderate stay extension  Associated conditions requiring surgery (eg, perforated gastric ulcer, gastric outlet obstruction, aortoenteric fistula)   Expect brief to moderate stay extension  Active comorbidities (eg, renal insufficiency, malignancy, heart failure, liver disease, respiratory failure, malnutrition)   Anticipate treatment based on condition  Expect brief stay extension    Wound complications   Extended stay beyond goal length of stay for primary condition may be needed until ALL of the following are present:   Afebrile or fever improved and appropriate for next level of care   Hemodynamic stability   Infection absent or effective treatment instituted (eg, antibiotics) and performable at next level of care   Mental status at baseline   Pain absent or managed   Wound closed, continuity adequately restored, or wound manageable at lower level of care   No drain needed or drain care manageable at lower level of care   Wound hematoma or seroma resolving   Dressing care manageable at lower level of care   Coagulopathy absent, resolved, or treatable at lower level of care   Medical comorbidities resolved or treatable at lower level of care    Discussion Date (Time): 03/23/18 with Dr Vincent Baldwin

## 2018-03-23 NOTE — H&P
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    Plan:  NPO  On chronic TPN, will continue  D 10 for now  CT chest abdomen pelvis with p  o  and IV contrast  Basic labs  SQH    History of Present Illness     HPI:  Nancy Joy is a 36 y o  female who is known to the surgery service  She was admitted earlier this month after a gastric perforation s/p gastric sleeve at an outside institution  She had multiple drains and exploratory laparotomy with multiple washouts with esophageal stenting and removal of a  shunt  Despite multiple attempts to cover her perforation with stenting, barium swallow was demonstrated persistent leak  Due to this, a nasal jejunal tube was placed and she was maintained on a enteral diet via this tube  She was ultimately discharged to rehab on 03/09/2018  In the interim it was reported that she possibly had tube feeds coming from the midline wound which was a remnant of her exploratory laparotomy  She was sent to an outside institution for evaluation  At that time the nasal jejunal tube was discontinued and she was started on TPN  She is sent to [facility] for further management of her complex surgical condition  She currently denies any fevers or chills  She states that the only thing bothering her as the esophageal stent causing discomfort  She also complains that she cannot sleep for well in any of these facilities  She otherwise denies fevers, chills, nausea, vomiting  She states she is having normal bowel movements and urinating normally  She denies any abdominal pain currently  Review of Systems   Constitutional: Negative  HENT: Negative  Eyes: Negative  Respiratory: Negative  Cardiovascular: Negative      Gastrointestinal: Negative  Endocrine: Negative  Genitourinary: Negative  Musculoskeletal: Negative  Skin: Negative  Allergic/Immunologic: Negative  Neurological: Negative  Hematological: Negative  Psychiatric/Behavioral: Negative  Historical Information   Past Medical History:   Diagnosis Date    Brain condition     Pseudotumor Cerebri     Migraine     Obesity     Papilledema, both eyes     Presence of lumboperitoneal shunt     Resolved: Sep 20, 2017    Rotator cuff tendinitis     Resolved: Aug 23, 2017    Visual field defect      Past Surgical History:   Procedure Laterality Date    CSF SHUNT      LP shunt - 2015 -  shunt - 2017    ESOPHAGOGASTRODUODENOSCOPY N/A 2/23/2018    Procedure: ESOPHAGOGASTRODUODENOSCOPY (EGD) WITH ESOPHAGEAL STENT PLACEMENT;  Surgeon: Krystyna Osborne MD;  [facility];  Service: Thoracic    ESOPHAGOGASTRODUODENOSCOPY N/A 2/23/2018    Procedure: ESOPHAGOGASTRODUODENOSCOPY (EGD) WITH REMOVAL ESOPHAGEAL STENT  AND REPLACEMENT WITH 23mm X155mm [address];  Surgeon: Krystyna Osborne MD;  [facility];  Service: Thoracic    ESOPHAGOSCOPY WITH STENT INSERTION N/A 1/24/2018    Procedure: INSERTION STENT ESOPHAGEAL;  Surgeon: Mary Louis MD;  Location: [facility]; Service: Gastroenterology    EYE SURGERY      for "crossed eyed" (in 2nd grade)     GASTRIC BYPASS  2016    HYSTERECTOMY      LAPAROTOMY N/A 1/25/2018    Procedure: Exploratory Laparotomy, wash out,placement of drains, placement of NG feeding tube ;   Surgeon: Ashok Pierce DO;  [facility];  Service: General    DE CREATE SHUNT:VENTRIC-PERITONEAL Right 5/31/2017    Procedure: IMAGE GUIDED CORONAL PLACEMENT OF PROGRAMABLE VENTRICULAR-PERITONEAL SHUNT, REMOVAL OF LP SHUNT ;  Surgeon: Barbara Diallo MD;  [facility];  Service: Neurosurgery    DE Venus Pallas CSF SHUNT,W/O REPLACE Right 2/5/2018    Procedure: Removal of  shunt;  Surgeon: Patito Bernal MD;  [facility];  Service: Neurosurgery    TN REPLACEMENT/REVISION,CSF SHUNT Right 2018    Procedure: Externalization of right-sided SHUNT VENTRICULAR-PERITONEAL in anterior chest wall ribs two and three level  ;  Surgeon: Christine Geronimo MD;  [facility];  Service: Neurosurgery     Social History   History   Alcohol Use No     History   Drug Use No     History   Smoking Status    Never Smoker   Smokeless Tobacco    Never Used     Family History:   Family History   Problem Relation Age of Onset    No Known Problems Mother     No Known Problems Father     Thyroid cancer Brother     Colon cancer Maternal Grandfather     Cancer Paternal Grandmother     Cancer Paternal Grandfather        Meds/Allergies   PTA meds:   Prior to Admission Medications   Prescriptions Last Dose Informant Patient Reported? Taking?    Biotin 10 MG CAPS   Yes No   Sig: Take by mouth daily   Calcium Carbonate (CALCIUM 600 PO)   Yes No   Sig: Take by mouth daily   FIBER PO   Yes No   Sig: Take by mouth daily   HYDROmorphone (DILAUDID) 4 mg tablet   No No   Sig: Take 1 tablet by mouth every 8 hours as needed for moderate pain  Take 2 tablet by mouth every 8 hours as needed for severe pain   Multiple Vitamin (MULTIVITAMIN) tablet   Yes No   Sig: Take 1 tablet by mouth daily   Vitamin D, Cholecalciferol, 1000 units TABS   Yes No   Sig: Take by mouth daily   acetaminophen (TYLENOL) 160 mg/5 mL suspension   No No   Si 4 mL (975 mg total) by Per J Tube route every 8 (eight) hours   acetaminophen (TYLENOL) 325 mg tablet   No No   Sig: Take 2 tablets by mouth every 4 (four) hours as needed for mild pain   cyanocobalamin (VITAMIN B-12) 1,000 mcg tablet   Yes No   Sig: Take 1,000 mcg by mouth daily   diazepam (VALIUM) 1 mg/mL solution   No No   Sig: Take 2 5 mL (2 5 mg total) by mouth every 8 (eight) hours as needed for anxiety for up to 10 days   docusate sodium (COLACE) 100 mg capsule   No No   Sig: Take 1 capsule by mouth 2 (two) times a day   gabapentin (NEURONTIN) 300 mg capsule   Yes No   Sig: Take 300 mg by mouth daily at bedtime     indomethacin (INDOCIN) 50 mg capsule   Yes No   Sig: Take 50 mg by mouth 2 (two) times a day as needed for mild pain   levETIRAcetam (KEPPRA) 250 mg tablet   No No   Sig: Take 2 tablets by mouth 2 (two) times a day   lidocaine (LIDODERM) 5 %   No No   Sig: Place 1 patch on the skin daily Remove & Discard patch within 12 hours or as directed by MD   magnesium oxide (MAG-OX) 400 mg   No No   Sig: Take 1 tablet by mouth daily for 30 days   menthol-methyl salicylate (BENGAY) 31-95 % cream   No No   Sig: Apply topically 4 (four) times a day as needed (muscle ache)   mirtazapine (REMERON SOL-TAB) 15 mg disintegrating tablet   No No   Sig: Take 1 tablet (15 mg total) by mouth daily at bedtime   nortriptyline (PAMELOR) 10 mg capsule   Yes No   Sig: Take by mouth 2 (two) times a day   ondansetron (ZOFRAN-ODT) 4 mg disintegrating tablet   No No   Sig: Take 1 tablet by mouth every 6 (six) hours as needed for nausea or vomiting   pantoprazole (PROTONIX) 40 mg tablet   Yes No   Sig: Take 40 mg by mouth 2 (two) times a day   pantoprazole (PROTONIX) 40 mg tablet   Yes No   Sig: Take 40 mg by mouth daily      Facility-Administered Medications: None     Allergies   Allergen Reactions    Benadryl [Diphenhydramine] Anaphylaxis     Throat closing    Benadryl [Diphenhydramine] Swelling    Phenergan [Promethazine] Anaphylaxis    Phenergan [Promethazine] Hives       Objective   First Vitals:   Blood Pressure: 152/82 (Map 111) (03/22/18 2001)  Pulse: 89 (03/22/18 2001)  Temperature: 98 8 °F (37 1 °C) (03/22/18 2001)  Temp Source: Oral (03/22/18 2001)  Respirations: 16 (03/22/18 2001)  Height: 5' 4" (162 6 cm) (03/22/18 2001)  Weight - Scale: 55 3 kg (122 lb) (03/22/18 2001)  SpO2: 95 % (03/22/18 2001)    Current Vitals:   Blood Pressure: 133/75 (Map 99) (03/22/18 2342)  Pulse: 89 (03/22/18 2342)  Temperature: 98 1 °F (36 7 °C) (03/22/18 2342)  Temp Source: Oral (03/22/18 2342)  Respirations: 18 (03/22/18 2342)  Height: 5' 4" (162 6 cm) (03/22/18 2001)  Weight - Scale: 55 3 kg (122 lb) (03/22/18 2001)  SpO2: 95 % (03/22/18 2342)    No intake or output data in the 24 hours ending 03/22/18 2349    Invasive Devices     Peripherally Inserted Central Catheter Line            PICC Line Right -- days          Drain            Closed/Suction Drain Left; Other (Comment) Abdomen Bulb -- days    Closed/Suction Drain Right Abdomen Other (Comment) -- days    Closed/Suction Drain Midline;Superior Abdomen Other (Comment) 10 Fr  34 days    NG/OG/Enteral Tube Nasogastric 8 Fr Left nares 27 days    Open Drain Midline Abdomen 17 days    Closed/Suction Drain Left;Lateral LUQ Bulb 12 Fr  16 days          Airway            ETT  Cuffed;Oral 7 mm 57 days                Physical Exam   Constitutional: She is oriented to person, place, and time  She appears well-developed and well-nourished  HENT:   Head: Normocephalic  Eyes: Pupils are equal, round, and reactive to light  Neck: Normal range of motion  Cardiovascular: Normal rate  Pulmonary/Chest: Effort normal  No respiratory distress  She exhibits no tenderness  Abdominal: Soft  She exhibits no distension  There is no tenderness  The midline wound with wound manager over  Milky greenish output  Drains in place with  Her output  Musculoskeletal: Normal range of motion  She exhibits no tenderness  Neurological: She is alert and oriented to person, place, and time  Skin: Skin is warm and dry  Psychiatric: She has a normal mood and affect   Her behavior is normal        Lab Results:   CBC:   Lab Results   Component Value Date    WBC 12 12 (H) 03/22/2018    HGB 10 3 (L) 03/22/2018    HCT 32 3 (L) 03/22/2018    MCV 86 03/22/2018     03/22/2018    MCH 27 5 03/22/2018    MCHC 31 9 03/22/2018    RDW 16 4 (H) 03/22/2018    MPV 9 6 03/22/2018    NRBC 0 03/22/2018   , CMP: No results found for: NA, K, CL, CO2, ANIONGAP, BUN, CREATININE, GLUCOSE, CALCIUM, AST, ALT, ALKPHOS, PROT, ALBUMIN, BILITOT, EGFR  Imaging: I have personally reviewed pertinent reports  EKG, Pathology, and Other Studies: I have personally reviewed pertinent reports        Code Status: Prior  Advance Directive and Living Will:      Power of :    POLST:

## 2018-03-23 NOTE — CASE MANAGEMENT
Thank you,  [facility] in the [facility] by Donald Villa for 2017  Network Utilization Review Department  Phone: [phone]; Fax [phone]  ATTENTION: The Network Utilization Review Department is now centralized for our 7 Facilities  Make a note that we have a new phone and fax numbers for our Department  Please call with any questions or concerns to [phone] and carefully follow the prompts so that you are directed to the right person  All voicemails are confidential  Fax any determinations, approvals, denials, and requests for initial or continue stay review clinical to [phone]  Due to HIGH CALL volume, it would be easier if you could please send faxed requests to expedite your requests and in part, help us provide discharge notifications faster     ============================================================================    Initial Clinical Review    Direct Medical Admission    Admission: Date/Time/Statement: 3/22/18 @ 2157  Orders Placed This Encounter   Procedures    Inpatient Admission     Standing Status:   Standing     Number of Occurrences:   1     Order Specific Question:   Admitting Physician     Answer:   Betty Narvaez [[id number]]     Order Specific Question:   Level of Care     Answer:   Med Surg [16]     Order Specific Question:   Estimated length of stay     Answer:   More than 2 Midnights     Order Specific Question:   Certification     Answer:   I certify that inpatient services are medically necessary for this patient for a duration of greater than two midnights  See H&P and MD Progress Notes for additional information about the patient's course of treatment  Chief Complaint:  history of gastric perforation presents for evaluation of 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    History of Illness:   Isatu Jackson is a 36 y o  female who is known to the surgery service    She was admitted earlier this month after a gastric perforation s/p gastric sleeve at an outside institution  She had multiple drains and exploratory laparotomy with multiple washouts with esophageal stenting and removal of a  shunt  Despite multiple attempts to cover her perforation with stenting, barium swallow was demonstrated persistent leak  Due to this, a nasal jejunal tube was placed and she was maintained on a enteral diet via this tube  She was ultimately discharged to rehab on 03/09/2018  In the interim it was reported that she possibly had tube feeds coming from the midline wound which was a remnant of her exploratory laparotomy  She was sent to an outside institution for evaluation  At that time the nasal jejunal tube was discontinued and she was started on TPN  She is sent to [facility] for further management of her complex surgical condition  Vital Signs:   Temperature Pulse Respirations Blood Pressure SpO2   03/22/18 2001 03/22/18 2001 03/22/18 2001 03/22/18 2001 03/22/18 2001   98 8 °F (37 1 °C) 89 16 152/82 95 %      Temp Source Heart Rate Source Patient Position - Orthostatic VS BP Location FiO2 (%)   03/22/18 2001 03/22/18 2001 03/22/18 2001 03/22/18 2001 --   Oral Monitor Lying Left arm       Pain Score       03/22/18 1939       6        Wt Readings from Last 1 Encounters:   03/22/18 55 3 kg (122 lb)         Abnormal Labs/Diagnostic Test Results:    WBC 12 12 (H) 03/22/2018     HGB 10 3 (L) 03/22/2018     HCT 32 3 (L) 03/22/2018     MCV 86 03/22/2018      03/22/2018     Ct Chest Abdomen Pelvis W Contrast: [ending      Past Medical/Surgical History:    Active Ambulatory Problems     Diagnosis Date Noted    Pseudotumor cerebri 05/31/2017    S/P  shunt 05/31/2017    Occipital headache 05/31/2017    Brain condition     Gastric leak 01/24/2018    Acute peritonitis 01/24/2018    Idiopathic intracranial hypertension 01/24/2018    S/P  shunt 01/24/2018    Infection of  (ventriculoperitoneal) shunt, subsequent encounter 01/24/2018    Murmur, cardiac 01/26/2018    Refusal of blood product 01/31/2018    Gastroesophageal reflux disease 12/15/2017    Choroidal nevus, right eye 02/03/2018    Moderate protein-calorie malnutrition ([address] ) 02/21/2018    Gastric perforation (HCC) 01/24/2018    Postoperative intra-abdominal abscess (HCC) 03/05/2018    Abdominal wound dehiscence 28/19/3973    Neutrophilic leukocytosis 97/34/7494    Left-sided chest wall pain 03/06/2018     Resolved Ambulatory Problems     Diagnosis Date Noted    Peritonitis 01/24/2018    Adjustment disorder with depressed mood 02/08/2018     Past Medical History:   Diagnosis Date    Brain condition     Migraine     Obesity     Papilledema, both eyes     Presence of lumboperitoneal shunt     Rotator cuff tendinitis     Visual field defect        Admitting Diagnosis: Gastric perforation ([address] ) [K25 5]    Age/Sex: 36 y o  female    Assessment/Plan:   36 F with gastric perforation after hiatal hernia repair and 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     Plan:  NPO  On chronic TPN, will continue  D 10 for now  CT chest abdomen pelvis with p  o  and IV contrast  Basic labs  SQH    Admission Orders:  Scheduled Meds:   Current Facility-Administered Medications:  Adult TPN (STANDARD BASE/STANDARD ELECTROLYTE)  Intravenous Continuous    dextrose 60 mL/hr Intravenous Continuous Last Rate: 60 mL/hr (03/23/18 1051)   heparin (porcine) 5,000 Units Subcutaneous Q8H [address]    HYDROmorphone 0 5 mg Intravenous Q3H PRN    HYDROmorphone 1 mg Intravenous Q3H PRN    insulin lispro 1-5 Units Subcutaneous Q6H MYRNA    iohexol 50 mL Oral 90 min pre-procedure    LORazepam 0 5 mg Intravenous Q6H PRN    ondansetron 4 mg Intravenous Q6H PRN    pantoprazole 40 mg Intravenous Q24H [address]    potassium chloride 20 mEq Intravenous Q2H Last Rate: 20 mEq (03/23/18 1253)   potassium chloride 20 mEq Intravenous Once      Continuous Infusions:   Adult TPN (STANDARD BASE/STANDARD ELECTROLYTE)     dextrose 60 mL/hr Last Rate: 60 mL/hr (03/23/18 0751)     VTE Pharmacologic Prophylaxis: Heparin  VTE Mechanical Prophylaxis: sequential compression device    NPO  Glucose finger stick QID

## 2018-03-23 NOTE — SOCIAL WORK
Pt is transferred to [facility] from [facility]  CM met w/ pt to obtain info  Pt reported she resides w/ her paramour and 15-yr-old son in a 1-story ranch house w/ no steps inside and 2 steps to enter house at front door  Pt's paramour Jaswant Ontiveros (c: [phone]) is primary contact  Pt reported she is independent at baseline w/ ambulating and performing her ADLS  Pt reported no DME in home and no hx of HHC services  Pt reported her only hx of i/p rehab placement is her current stay at [facility] where she has been placed since Jan 2018  Pt reported no hx of mental health issues nor D&A issues  Pt reported her PCP is Dr Jannie Samayoa through [facility] located in [address]  Pt reported she uses [address] located in [facility] for her Rx needs  Pt reported she is not employed and has no source of income  Pt clarified that she is not eligible for UC benefits, Welfare, TANF, [employer] Income (SSI), nor SSDI benefits  Pt is dependent upon her paramour for all sustenance  Pt is enrolled in [address] [place] [address] for healthcare coverage and Rx benefits  Pt reported she does not have a legally pre-designated medical POA appointed for herself  Pt reported either her paramour or her mother can provide transportation for her at time of d/c     CM reviewed d/c planning process including the following: identifying help at home, patient preference for d/c planning needs, Discharge Lounge, [facility] Meds to Bed program, availability of treatment team to discuss questions or concerns patient and/or family may have regarding understanding medications and recognizing signs and symptoms once discharged  CM also encouraged patient to follow up with all recommended appointments after discharge  Patient advised of importance for patient and family to participate in managing patients medical well being      CM made Ecin referral to [facility] for them to follow pt's case while hospitalized  Pt is aware and agreeable to this  CM will reassess pt for additional d/c needs once recommendations for her aftercare are made by the tx team  CM to follow

## 2018-03-23 NOTE — PROGRESS NOTES
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    Plan:  Keep NPO  Start TPN today  Maintain on D10 IV fluids for now  Follow-up CT chest abdomen pelvis with p  o  and IV contrast  Monitor and replete electrolytes  DVT prophylaxis  Encourage ambulation    Subjective/Objective     Chief Complaint:     Subjective:  No acute events overnight      Objective:     Vitals: Blood pressure 133/75, pulse 89, temperature 98 1 °F (36 7 °C), temperature source Oral, resp  rate 18, height 5' 4" (1 626 m), weight 55 3 kg (122 lb), SpO2 95 %, not currently breastfeeding  ,Body mass index is 20 94 kg/m²  I/O       03/21 0701 - 03/22 0700 03/22 0701 - 03/23 0700    I V  (mL/kg)  490 (8 9)    Total Intake(mL/kg)  490 (8 9)    Net   +490                Physical Exam:   No acute distress  Nonlabored respirations on room air  Regular rate and rhythm  Abdomen soft, minimally tender    Anterior abdominal ADENIKE drain and anterior abdominal wall ostomy bag draining purulent appearing material   Left lateral abdominal wall drain with light brown effluent    Lab, Imaging and other studies:   CBC with diff:   Lab Results   Component Value Date    WBC 12 12 (H) 03/22/2018    HGB 10 3 (L) 03/22/2018    HCT 32 3 (L) 03/22/2018    MCV 86 03/22/2018     03/22/2018    MCH 27 5 03/22/2018    MCHC 31 9 03/22/2018    RDW 16 4 (H) 03/22/2018    MPV 9 6 03/22/2018    NRBC 0 03/22/2018   , BMP/CMP:   Lab Results   Component Value Date     03/22/2018    K 3 5 03/22/2018     03/22/2018    CO2 32 03/22/2018    ANIONGAP 5 03/22/2018    BUN 21 03/22/2018    CREATININE 0 44 (L) 03/22/2018    GLUCOSE 92 03/22/2018    CALCIUM 10 4 (H) 03/22/2018    EGFR 124 03/22/2018   , Magnesium: No results found for: MAG, Coags: No results found for: PT, PTT, INR, Blood Culture: No results found for: BLOODCX  VTE Pharmacologic Prophylaxis: Heparin  VTE Mechanical Prophylaxis: sequential compression device

## 2018-03-23 NOTE — NUTRITION
03/23/18 1526   Recommendations/Interventions   Nutrition Recommendations Other (specify)  (Replete K  Rec standard TPN day 1, advancing as tolerated to: 660ml 15% AA, 590ml 50% dextrose, 300ml 20% lipid (1999kcal, 99gPRO, 3 7mgCHO/kg/min)   Monitor electrolytes closely )

## 2018-03-23 NOTE — PROGRESS NOTES
No current verification of PICC placement and no active orders to use PICC line  Kathy Christianson with [facility] says check for blood return,  Then PICC okay to use

## 2018-03-24 LAB
ALBUMIN SERPL BCP-MCNC: 2.3 G/DL (ref 3.5–5)
ALP SERPL-CCNC: 75 U/L (ref 46–116)
ALT SERPL W P-5'-P-CCNC: 8 U/L (ref 12–78)
ANION GAP SERPL CALCULATED.3IONS-SCNC: 7 MMOL/L (ref 4–13)
AST SERPL W P-5'-P-CCNC: 11 U/L (ref 5–45)
BASOPHILS # BLD AUTO: 0.05 THOUSANDS/ΜL (ref 0–0.1)
BASOPHILS NFR BLD AUTO: 1 % (ref 0–1)
BILIRUB DIRECT SERPL-MCNC: 0.13 MG/DL (ref 0–0.2)
BILIRUB SERPL-MCNC: 0.53 MG/DL (ref 0.2–1)
BUN SERPL-MCNC: 13 MG/DL (ref 5–25)
CALCIUM SERPL-MCNC: 10.6 MG/DL (ref 8.3–10.1)
CHLORIDE SERPL-SCNC: 99 MMOL/L (ref 100–108)
CO2 SERPL-SCNC: 29 MMOL/L (ref 21–32)
CREAT SERPL-MCNC: 0.46 MG/DL (ref 0.6–1.3)
EOSINOPHIL # BLD AUTO: 0.25 THOUSAND/ΜL (ref 0–0.61)
EOSINOPHIL NFR BLD AUTO: 3 % (ref 0–6)
ERYTHROCYTE [DISTWIDTH] IN BLOOD BY AUTOMATED COUNT: 16.4 % (ref 11.6–15.1)
GFR SERPL CREATININE-BSD FRML MDRD: 122 ML/MIN/1.73SQ M
GLUCOSE SERPL-MCNC: 117 MG/DL (ref 65–140)
GLUCOSE SERPL-MCNC: 119 MG/DL (ref 65–140)
GLUCOSE SERPL-MCNC: 125 MG/DL (ref 65–140)
GLUCOSE SERPL-MCNC: 145 MG/DL (ref 65–140)
GLUCOSE SERPL-MCNC: 173 MG/DL (ref 65–140)
HCT VFR BLD AUTO: 32.7 % (ref 34.8–46.1)
HGB BLD-MCNC: 10.1 G/DL (ref 11.5–15.4)
LYMPHOCYTES # BLD AUTO: 1.67 THOUSANDS/ΜL (ref 0.6–4.47)
LYMPHOCYTES NFR BLD AUTO: 20 % (ref 14–44)
MAGNESIUM SERPL-MCNC: 2.1 MG/DL (ref 1.6–2.6)
MCH RBC QN AUTO: 26.6 PG (ref 26.8–34.3)
MCHC RBC AUTO-ENTMCNC: 30.9 G/DL (ref 31.4–37.4)
MCV RBC AUTO: 86 FL (ref 82–98)
MONOCYTES # BLD AUTO: 1.08 THOUSAND/ΜL (ref 0.17–1.22)
MONOCYTES NFR BLD AUTO: 13 % (ref 4–12)
NEUTROPHILS # BLD AUTO: 5.45 THOUSANDS/ΜL (ref 1.85–7.62)
NEUTS SEG NFR BLD AUTO: 63 % (ref 43–75)
NRBC BLD AUTO-RTO: 0 /100 WBCS
PLATELET # BLD AUTO: 211 THOUSANDS/UL (ref 149–390)
PMV BLD AUTO: 10.4 FL (ref 8.9–12.7)
POTASSIUM SERPL-SCNC: 3.6 MMOL/L (ref 3.5–5.3)
PREALB SERPL-MCNC: 17.8 MG/DL (ref 18–40)
PROT SERPL-MCNC: 6.2 G/DL (ref 6.4–8.2)
RBC # BLD AUTO: 3.79 MILLION/UL (ref 3.81–5.12)
SODIUM SERPL-SCNC: 135 MMOL/L (ref 136–145)
WBC # BLD AUTO: 8.53 THOUSAND/UL (ref 4.31–10.16)

## 2018-03-24 PROCEDURE — 82948 REAGENT STRIP/BLOOD GLUCOSE: CPT

## 2018-03-24 PROCEDURE — 99231 SBSQ HOSP IP/OBS SF/LOW 25: CPT | Performed by: THORACIC SURGERY (CARDIOTHORACIC VASCULAR SURGERY)

## 2018-03-24 PROCEDURE — C9113 INJ PANTOPRAZOLE SODIUM, VIA: HCPCS | Performed by: SURGERY

## 2018-03-24 PROCEDURE — 83735 ASSAY OF MAGNESIUM: CPT | Performed by: PHYSICIAN ASSISTANT

## 2018-03-24 PROCEDURE — 80076 HEPATIC FUNCTION PANEL: CPT | Performed by: PHYSICIAN ASSISTANT

## 2018-03-24 PROCEDURE — 80048 BASIC METABOLIC PNL TOTAL CA: CPT | Performed by: PHYSICIAN ASSISTANT

## 2018-03-24 PROCEDURE — 85025 COMPLETE CBC W/AUTO DIFF WBC: CPT | Performed by: PHYSICIAN ASSISTANT

## 2018-03-24 PROCEDURE — 84134 ASSAY OF PREALBUMIN: CPT | Performed by: PHYSICIAN ASSISTANT

## 2018-03-24 RX ORDER — POTASSIUM CHLORIDE 29.8 MG/ML
40 INJECTION INTRAVENOUS
Status: DISPENSED | OUTPATIENT
Start: 2018-03-24 | End: 2018-03-24

## 2018-03-24 RX ORDER — METOCLOPRAMIDE HYDROCHLORIDE 5 MG/ML
10 INJECTION INTRAMUSCULAR; INTRAVENOUS EVERY 6 HOURS PRN
Status: DISCONTINUED | OUTPATIENT
Start: 2018-03-24 | End: 2018-04-13 | Stop reason: HOSPADM

## 2018-03-24 RX ADMIN — HYDROMORPHONE HYDROCHLORIDE 1 MG: 1 INJECTION, SOLUTION INTRAMUSCULAR; INTRAVENOUS; SUBCUTANEOUS at 09:21

## 2018-03-24 RX ADMIN — METOCLOPRAMIDE 10 MG: 5 INJECTION, SOLUTION INTRAMUSCULAR; INTRAVENOUS at 18:48

## 2018-03-24 RX ADMIN — HEPARIN SODIUM 5000 UNITS: 5000 INJECTION, SOLUTION INTRAVENOUS; SUBCUTANEOUS at 05:08

## 2018-03-24 RX ADMIN — HEPARIN SODIUM 5000 UNITS: 5000 INJECTION, SOLUTION INTRAVENOUS; SUBCUTANEOUS at 15:07

## 2018-03-24 RX ADMIN — ONDANSETRON 4 MG: 2 INJECTION INTRAMUSCULAR; INTRAVENOUS at 15:42

## 2018-03-24 RX ADMIN — METOCLOPRAMIDE 10 MG: 5 INJECTION, SOLUTION INTRAMUSCULAR; INTRAVENOUS at 12:46

## 2018-03-24 RX ADMIN — LORAZEPAM 0.5 MG: 2 INJECTION INTRAMUSCULAR; INTRAVENOUS at 03:17

## 2018-03-24 RX ADMIN — HYDROMORPHONE HYDROCHLORIDE 1 MG: 1 INJECTION, SOLUTION INTRAMUSCULAR; INTRAVENOUS; SUBCUTANEOUS at 12:43

## 2018-03-24 RX ADMIN — PANTOPRAZOLE SODIUM 40 MG: 40 INJECTION, POWDER, FOR SOLUTION INTRAVENOUS at 09:21

## 2018-03-24 RX ADMIN — HYDROMORPHONE HYDROCHLORIDE 1 MG: 1 INJECTION, SOLUTION INTRAMUSCULAR; INTRAVENOUS; SUBCUTANEOUS at 18:48

## 2018-03-24 RX ADMIN — POTASSIUM CHLORIDE 40 MEQ: 400 INJECTION, SOLUTION INTRAVENOUS at 09:48

## 2018-03-24 RX ADMIN — CALCIUM GLUCONATE: 94 INJECTION, SOLUTION INTRAVENOUS at 21:40

## 2018-03-24 RX ADMIN — METOCLOPRAMIDE 10 MG: 5 INJECTION, SOLUTION INTRAMUSCULAR; INTRAVENOUS at 03:25

## 2018-03-24 RX ADMIN — HYDROMORPHONE HYDROCHLORIDE 1 MG: 1 INJECTION, SOLUTION INTRAMUSCULAR; INTRAVENOUS; SUBCUTANEOUS at 15:44

## 2018-03-24 RX ADMIN — ONDANSETRON 4 MG: 2 INJECTION INTRAMUSCULAR; INTRAVENOUS at 09:21

## 2018-03-24 RX ADMIN — POTASSIUM CHLORIDE 20 MEQ: 200 INJECTION, SOLUTION INTRAVENOUS at 09:46

## 2018-03-24 RX ADMIN — ONDANSETRON 4 MG: 2 INJECTION INTRAMUSCULAR; INTRAVENOUS at 21:41

## 2018-03-24 RX ADMIN — HYDROMORPHONE HYDROCHLORIDE 1 MG: 1 INJECTION, SOLUTION INTRAMUSCULAR; INTRAVENOUS; SUBCUTANEOUS at 03:19

## 2018-03-24 RX ADMIN — HYDROMORPHONE HYDROCHLORIDE 1 MG: 1 INJECTION, SOLUTION INTRAMUSCULAR; INTRAVENOUS; SUBCUTANEOUS at 21:45

## 2018-03-24 RX ADMIN — INSULIN LISPRO 1 UNITS: 100 INJECTION, SOLUTION INTRAVENOUS; SUBCUTANEOUS at 12:52

## 2018-03-24 RX ADMIN — HEPARIN SODIUM 5000 UNITS: 5000 INJECTION, SOLUTION INTRAVENOUS; SUBCUTANEOUS at 21:41

## 2018-03-24 NOTE — PROGRESS NOTES
Pt remains in bed, complains of frequent pain and nausea  Alert and oriented, heart and lung sounds WNL  Vital signs are stable  Wound manager is present on abdomen, bowel sounds hypoactive  Pulses are all palpable no edema is present  Pt has a very flat affect and is refusing to walk, sit in recliner, or even be washed  Multiple attempts have been made by nursing staff  Will continue to encourage pt  Continue to monitor

## 2018-03-24 NOTE — PROGRESS NOTES
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    CT AP-a persistent but smaller anterior gastric leak with a fistula to the anterior skin  Plan:  NPO  PICC/TPN  D/C D10 since on TPN  Will review results of CT with attending  Monitor and replete electrolytes  DVT: SQH/SCDs    Subjective/Objective     Chief Complaint: Nausea and vomiting last night  Midline wound manager with air and clear green liquid  Mild abdominal pain  +BF  Subjective:        Objective:     Vitals: Blood pressure 126/74, pulse 95, temperature 98 °F (36 7 °C), temperature source Oral, resp  rate 18, height 5' 4" (1 626 m), weight 55 3 kg (122 lb), SpO2 96 %, not currently breastfeeding  ,Body mass index is 20 94 kg/m²      I/O       03/21 0701 - 03/22 0700 03/22 0701 - 03/23 0700    I V  (mL/kg)  490 (8 9)    Total Intake(mL/kg)  490 (8 9)    Net   +490                Physical Exam: NAD  AAOx3  normal respiratory effort  soft, mildly TTP, ND  ADENIKE X 2 with light green output  Midline wound manager with air and light green output  no c/c/e      Lab, Imaging and other studies:   CBC with diff:   Lab Results   Component Value Date    WBC 8 62 03/23/2018    HGB 10 1 (L) 03/23/2018    HCT 31 7 (L) 03/23/2018    MCV 87 03/23/2018     03/23/2018    MCH 27 7 03/23/2018    MCHC 31 9 03/23/2018    RDW 16 4 (H) 03/23/2018    MPV 9 9 03/23/2018    NRBC 0 03/23/2018   , BMP/CMP:   Lab Results   Component Value Date     03/23/2018    K 3 1 (L) 03/23/2018     03/23/2018    CO2 31 03/23/2018    ANIONGAP 6 03/23/2018    BUN 19 03/23/2018    CREATININE 0 49 (L) 03/23/2018    GLUCOSE 128 03/23/2018    CALCIUM 10 2 (H) 03/23/2018    EGFR 120 03/23/2018   , Magnesium: No results found for: MAG, Coags: No results found for: PT, PTT, INR, Blood Culture: No results found for: BLOODCX  VTE Pharmacologic Prophylaxis: Heparin  VTE Mechanical Prophylaxis: sequential compression device

## 2018-03-25 LAB
ANION GAP SERPL CALCULATED.3IONS-SCNC: 4 MMOL/L (ref 4–13)
BUN SERPL-MCNC: 20 MG/DL (ref 5–25)
CALCIUM SERPL-MCNC: 10.7 MG/DL (ref 8.3–10.1)
CHLORIDE SERPL-SCNC: 102 MMOL/L (ref 100–108)
CO2 SERPL-SCNC: 32 MMOL/L (ref 21–32)
CREAT SERPL-MCNC: 0.43 MG/DL (ref 0.6–1.3)
GFR SERPL CREATININE-BSD FRML MDRD: 125 ML/MIN/1.73SQ M
GLUCOSE SERPL-MCNC: 125 MG/DL (ref 65–140)
GLUCOSE SERPL-MCNC: 127 MG/DL (ref 65–140)
GLUCOSE SERPL-MCNC: 129 MG/DL (ref 65–140)
GLUCOSE SERPL-MCNC: 133 MG/DL (ref 65–140)
GLUCOSE SERPL-MCNC: 155 MG/DL (ref 65–140)
GLUCOSE SERPL-MCNC: 160 MG/DL (ref 65–140)
MAGNESIUM SERPL-MCNC: 2.2 MG/DL (ref 1.6–2.6)
PHOSPHATE SERPL-MCNC: 3.4 MG/DL (ref 2.7–4.5)
POTASSIUM SERPL-SCNC: 3.8 MMOL/L (ref 3.5–5.3)
SODIUM SERPL-SCNC: 138 MMOL/L (ref 136–145)

## 2018-03-25 PROCEDURE — 83735 ASSAY OF MAGNESIUM: CPT | Performed by: STUDENT IN AN ORGANIZED HEALTH CARE EDUCATION/TRAINING PROGRAM

## 2018-03-25 PROCEDURE — 99254 IP/OBS CNSLTJ NEW/EST MOD 60: CPT | Performed by: PHYSICIAN ASSISTANT

## 2018-03-25 PROCEDURE — 80048 BASIC METABOLIC PNL TOTAL CA: CPT | Performed by: STUDENT IN AN ORGANIZED HEALTH CARE EDUCATION/TRAINING PROGRAM

## 2018-03-25 PROCEDURE — 84100 ASSAY OF PHOSPHORUS: CPT | Performed by: STUDENT IN AN ORGANIZED HEALTH CARE EDUCATION/TRAINING PROGRAM

## 2018-03-25 PROCEDURE — 99231 SBSQ HOSP IP/OBS SF/LOW 25: CPT | Performed by: THORACIC SURGERY (CARDIOTHORACIC VASCULAR SURGERY)

## 2018-03-25 PROCEDURE — 82948 REAGENT STRIP/BLOOD GLUCOSE: CPT

## 2018-03-25 PROCEDURE — C9113 INJ PANTOPRAZOLE SODIUM, VIA: HCPCS | Performed by: SURGERY

## 2018-03-25 RX ORDER — POTASSIUM CHLORIDE 14.9 MG/ML
20 INJECTION INTRAVENOUS ONCE
Status: COMPLETED | OUTPATIENT
Start: 2018-03-25 | End: 2018-03-26

## 2018-03-25 RX ADMIN — HYDROMORPHONE HYDROCHLORIDE 0.5 MG: 1 INJECTION, SOLUTION INTRAMUSCULAR; INTRAVENOUS; SUBCUTANEOUS at 15:39

## 2018-03-25 RX ADMIN — HYDROMORPHONE HYDROCHLORIDE 0.5 MG: 1 INJECTION, SOLUTION INTRAMUSCULAR; INTRAVENOUS; SUBCUTANEOUS at 21:58

## 2018-03-25 RX ADMIN — INSULIN LISPRO 1 UNITS: 100 INJECTION, SOLUTION INTRAVENOUS; SUBCUTANEOUS at 12:26

## 2018-03-25 RX ADMIN — HYDROMORPHONE HYDROCHLORIDE 0.5 MG: 1 INJECTION, SOLUTION INTRAMUSCULAR; INTRAVENOUS; SUBCUTANEOUS at 00:55

## 2018-03-25 RX ADMIN — METOCLOPRAMIDE 10 MG: 5 INJECTION, SOLUTION INTRAMUSCULAR; INTRAVENOUS at 09:06

## 2018-03-25 RX ADMIN — POTASSIUM CHLORIDE 20 MEQ: 200 INJECTION, SOLUTION INTRAVENOUS at 09:37

## 2018-03-25 RX ADMIN — METOCLOPRAMIDE 10 MG: 5 INJECTION, SOLUTION INTRAMUSCULAR; INTRAVENOUS at 15:40

## 2018-03-25 RX ADMIN — INSULIN LISPRO 1 UNITS: 100 INJECTION, SOLUTION INTRAVENOUS; SUBCUTANEOUS at 01:02

## 2018-03-25 RX ADMIN — HYDROMORPHONE HYDROCHLORIDE 0.5 MG: 1 INJECTION, SOLUTION INTRAMUSCULAR; INTRAVENOUS; SUBCUTANEOUS at 09:07

## 2018-03-25 RX ADMIN — ONDANSETRON 4 MG: 2 INJECTION INTRAMUSCULAR; INTRAVENOUS at 18:47

## 2018-03-25 RX ADMIN — HEPARIN SODIUM 5000 UNITS: 5000 INJECTION, SOLUTION INTRAVENOUS; SUBCUTANEOUS at 05:24

## 2018-03-25 RX ADMIN — ONDANSETRON 4 MG: 2 INJECTION INTRAMUSCULAR; INTRAVENOUS at 12:26

## 2018-03-25 RX ADMIN — HYDROMORPHONE HYDROCHLORIDE 0.5 MG: 1 INJECTION, SOLUTION INTRAMUSCULAR; INTRAVENOUS; SUBCUTANEOUS at 18:47

## 2018-03-25 RX ADMIN — METOCLOPRAMIDE 10 MG: 5 INJECTION, SOLUTION INTRAMUSCULAR; INTRAVENOUS at 21:58

## 2018-03-25 RX ADMIN — METOCLOPRAMIDE 10 MG: 5 INJECTION, SOLUTION INTRAMUSCULAR; INTRAVENOUS at 00:54

## 2018-03-25 RX ADMIN — LORAZEPAM 0.5 MG: 2 INJECTION INTRAMUSCULAR; INTRAVENOUS at 21:58

## 2018-03-25 RX ADMIN — CALCIUM GLUCONATE: 94 INJECTION, SOLUTION INTRAVENOUS at 21:51

## 2018-03-25 RX ADMIN — HEPARIN SODIUM 5000 UNITS: 5000 INJECTION, SOLUTION INTRAVENOUS; SUBCUTANEOUS at 21:51

## 2018-03-25 RX ADMIN — ONDANSETRON 4 MG: 2 INJECTION INTRAMUSCULAR; INTRAVENOUS at 05:24

## 2018-03-25 RX ADMIN — HEPARIN SODIUM 5000 UNITS: 5000 INJECTION, SOLUTION INTRAVENOUS; SUBCUTANEOUS at 14:00

## 2018-03-25 RX ADMIN — PANTOPRAZOLE SODIUM 40 MG: 40 INJECTION, POWDER, FOR SOLUTION INTRAVENOUS at 09:06

## 2018-03-25 RX ADMIN — HYDROMORPHONE HYDROCHLORIDE 0.5 MG: 1 INJECTION, SOLUTION INTRAMUSCULAR; INTRAVENOUS; SUBCUTANEOUS at 05:25

## 2018-03-25 RX ADMIN — HYDROMORPHONE HYDROCHLORIDE 0.5 MG: 1 INJECTION, SOLUTION INTRAMUSCULAR; INTRAVENOUS; SUBCUTANEOUS at 12:26

## 2018-03-25 NOTE — PROGRESS NOTES
Progress Note - Thoracic Surgery   Effie Villalobos 36 y o  female MRN: [id number]  Unit/Bed#: [facility] [place] 427-01 Encounter: [id number]    Assessment:  36 F with gastric perforation    Plan:  PICC/TPN  Continue drainage, IR for possible repositioning  GI vs general surgery for jejunal access  SQH    Subjective/Objective     Subjective: No acute events  Some abdominal pain  Objective:    Blood pressure 127/76, pulse 99, temperature 98 9 °F (37 2 °C), temperature source Oral, resp  rate 18, height 5' 4" (1 626 m), weight 55 3 kg (122 lb), SpO2 94 %, not currently breastfeeding  ,Body mass index is 20 94 kg/m²  Intake/Output Summary (Last 24 hours) at 03/25/18 0746  Last data filed at 03/25/18 0600   Gross per 24 hour   Intake           464 13 ml   Output             3465 ml   Net         -3000 87 ml       Invasive Devices     Peripherally Inserted Central Catheter Line            PICC Line 03/22/18 3 days          Peripheral Intravenous Line            Peripheral IV 03/23/18 Right Forearm 1 day          Drain            Closed/Suction Drain Left; Other (Comment) Abdomen Bulb -- days    Closed/Suction Drain Right Abdomen Other (Comment) -- days    Closed/Suction Drain Midline;Superior Abdomen Other (Comment) 10 Fr  36 days    NG/OG/Enteral Tube Nasogastric 8 Fr Left nares 29 days    Open Drain Midline Abdomen 20 days    Closed/Suction Drain Left;Lateral LUQ Bulb 12 Fr   18 days                Physical Exam:   General: NAD, AAOx3  CV: RRR +S1/S2  Chest: breath sounds bilaterally  Abdomen: soft, mildly tender, non-distended, fistula and drains with bile tinged output  Extremities: atraumatic, no edema        Results from last 7 days  Lab Units 03/24/18  0502 03/23/18  0550 03/22/18  2335   WBC Thousand/uL 8 53 8 62 12 12*   HEMOGLOBIN g/dL 10 1* 10 1* 10 3*   HEMATOCRIT % 32 7* 31 7* 32 3*   PLATELETS Thousands/uL 211 233 226       Results from last 7 days  Lab Units 03/25/18  0538 03/24/18  0502 03/23/18  0550   SODIUM mmol/L 138 135* 138   POTASSIUM mmol/L 3 8 3 6 3 1*   CHLORIDE mmol/L 102 99* 101   CO2 mmol/L 32 29 31   BUN mg/dL 20 13 19   CREATININE mg/dL 0 43* 0 46* 0 49*   GLUCOSE RANDOM mg/dL 125 117 128   CALCIUM mg/dL 10 7* 10 6* 10 2*

## 2018-03-25 NOTE — CONSULTS
Consultation - 126 [facility]  Ata Clay 36 y o  female MRN: [id number]  Unit/Bed#: [facility] 427-01 Encounter: [id number]        Inpatient consult to gastroenterology  Consult performed by: Brad Dixon ordered by: Ros Hernandez          Reason for Consult / Principal Problem: possible PEGJ placement    ASSESSMENT and PLAN:    Principal Problem:    Gastric perforation ([address] )    #1  Possible PEGJ placement status post gastric perforation with gastric cutaneous fistula formation after hiatal hernia surgery complication  -for now continue TPN  -will further evaluate patient this week for possible PEGJ placement if able with her current anatomy  If GI is unable to do so, consider consulting surgery for placement   -continue drainage bag for fistula drainage   -further management of care per thoracic surgery  -------------------------------------------------------------------------------------------------------------------    HPI:  This is a 41-year-old female with recent gastric perforation status post gastric sleeve at an outside institution  She had multiple drains an ex lap performed with multiple washouts and esophageal stenting and removal BP shunt  The she was discharged earlier in March on the 9th  Currently she is having discharge from a gastric cutaneous fistula which currently has a drainage bag over it  We were consulted to help with a more permanent form of enteral feeding  Thoracic surgery is requesting that we place a PEGJ tube if possible  Patient reports that the majority of her pain is in her upper abdomen and chest   She reports nausea and dry heaves but no actual vomiting  She reports that her last bowel movement was the day she came in  She denies any diarrhea or constipation  She denies any blood in the stool  REVIEW OF SYSTEMS:    CONSTITUTIONAL: Denies any fever, chills, or rigors  Decreased appetite, and no recent weight loss    HEENT: No earache or tinnitus  Denies hearing loss or visual disturbances  CARDIOVASCULAR: No palpitations  +chest pain  RESPIRATORY: Denies any cough, hemoptysis, shortness of breath or dyspnea on exertion  GASTROINTESTINAL: As noted in the History of Present Illness  GENITOURINARY: No problems with urination  Denies any hematuria or dysuria  NEUROLOGIC: No dizziness or vertigo, denies headaches  MUSCULOSKELETAL: Denies any muscle or joint pain  SKIN: Denies skin rashes or itching  ENDOCRINE: Denies excessive thirst  Denies intolerance to heat or cold  PSYCHOSOCIAL: Denies depression or anxiety  Denies any recent memory loss  Historical Information   Past Medical History:   Diagnosis Date    Brain condition     Pseudotumor Cerebri     Migraine     Obesity     Papilledema, both eyes     Presence of lumboperitoneal shunt     Resolved: Sep 20, 2017    Rotator cuff tendinitis     Resolved: Aug 23, 2017    Visual field defect      Past Surgical History:   Procedure Laterality Date    CSF SHUNT      LP shunt - 2015 -  shunt - 2017    ESOPHAGOGASTRODUODENOSCOPY N/A 2/23/2018    Procedure: ESOPHAGOGASTRODUODENOSCOPY (EGD) WITH ESOPHAGEAL STENT PLACEMENT;  Surgeon: Aristeo Lara MD;  [facility];  Service: Thoracic    ESOPHAGOGASTRODUODENOSCOPY N/A 2/23/2018    Procedure: ESOPHAGOGASTRODUODENOSCOPY (EGD) WITH REMOVAL ESOPHAGEAL STENT  AND REPLACEMENT WITH 23mm X155mm [address];  Surgeon: Aristeo Lara MD;  [facility];  Service: Thoracic    ESOPHAGOSCOPY WITH STENT INSERTION N/A 1/24/2018    Procedure: INSERTION STENT ESOPHAGEAL;  Surgeon: Dany Louie MD;  Location: [facility]; Service: Gastroenterology    EYE SURGERY      for "crossed eyed" (in 2nd grade)     GASTRIC BYPASS  2016    HYSTERECTOMY      LAPAROTOMY N/A 1/25/2018    Procedure: Exploratory Laparotomy, wash out,placement of drains, placement of NG feeding tube ;   Surgeon: Deanna Granados DO; [facility];  Service: General    CA CREATE SHUNT:VENTRIC-PERITONEAL Right 5/31/2017    Procedure: IMAGE GUIDED CORONAL PLACEMENT OF PROGRAMABLE VENTRICULAR-PERITONEAL SHUNT, REMOVAL OF LP SHUNT ;  Surgeon: Michael Burns MD;  [facility];  Service: Neurosurgery    CA REMOVAL,COMPLETE CSF SHUNT,W/O REPLACE Right 2/5/2018    Procedure: Removal of  shunt;  Surgeon: Michael Burns MD;  [facility];  Service: Neurosurgery    CA REPLACEMENT/REVISION,CSF SHUNT Right 1/25/2018    Procedure: Externalization of right-sided SHUNT VENTRICULAR-PERITONEAL in anterior chest wall ribs two and three level  ;  Surgeon: Emma Grajeda MD;  [facility];  Service: Neurosurgery     Social History   History   Alcohol Use No     History   Drug Use No     History   Smoking Status    Never Smoker   Smokeless Tobacco    Never Used     Family History   Problem Relation Age of Onset    No Known Problems Mother     No Known Problems Father     Thyroid cancer Brother     Colon cancer Maternal Grandfather     Cancer Paternal Grandmother     Cancer Paternal Grandfather        Meds/Allergies     Prescriptions Prior to Admission   Medication    acetaminophen (TYLENOL) 160 mg/5 mL suspension    acetaminophen (TYLENOL) 325 mg tablet    Biotin 10 MG CAPS    Calcium Carbonate (CALCIUM 600 PO)    cyanocobalamin (VITAMIN B-12) 1,000 mcg tablet    diazepam (VALIUM) 1 mg/mL solution    docusate sodium (COLACE) 100 mg capsule    FIBER PO    gabapentin (NEURONTIN) 300 mg capsule    HYDROmorphone (DILAUDID) 4 mg tablet    indomethacin (INDOCIN) 50 mg capsule    levETIRAcetam (KEPPRA) 250 mg tablet    lidocaine (LIDODERM) 5 %    magnesium oxide (MAG-OX) 400 mg    menthol-methyl salicylate (BENGAY) 15-53 % cream    mirtazapine (REMERON SOL-TAB) 15 mg disintegrating tablet    Multiple Vitamin (MULTIVITAMIN) tablet    nortriptyline (PAMELOR) 10 mg capsule    ondansetron (ZOFRAN-ODT) 4 mg disintegrating tablet    pantoprazole (PROTONIX) 40 mg tablet    pantoprazole (PROTONIX) 40 mg tablet    Vitamin D, Cholecalciferol, 1000 units TABS     Current Facility-Administered Medications   Medication Dose Route Frequency    Adult 3-in-1 TPN (custom base / standard electrolytes)   Intravenous Continuous    Adult 3-in-1 TPN (custom base / standard electrolytes)   Intravenous Continuous    heparin (porcine) subcutaneous injection 5,000 Units  5,000 Units Subcutaneous Q8H [address]    HYDROmorphone (DILAUDID) injection 0 5 mg  0 5 mg Intravenous Q3H PRN    HYDROmorphone (DILAUDID) injection 1 mg  1 mg Intravenous Q3H PRN    insulin lispro (HumaLOG) 100 units/mL subcutaneous injection 1-5 Units  1-5 Units Subcutaneous Q6H [address]    LORazepam (ATIVAN) 2 mg/mL injection 0 5 mg  0 5 mg Intravenous Q6H PRN    metoclopramide (REGLAN) injection 10 mg  10 mg Intravenous Q6H PRN    ondansetron (ZOFRAN) injection 4 mg  4 mg Intravenous Q6H PRN    pantoprazole (PROTONIX) injection 40 mg  40 mg Intravenous Q24H MYRNA    potassium chloride 20 mEq IVPB (premix)  20 mEq Intravenous Once       Allergies   Allergen Reactions    Benadryl [Diphenhydramine] Anaphylaxis     Throat closing    Benadryl [Diphenhydramine] Swelling    Phenergan [Promethazine] Anaphylaxis    Phenergan [Promethazine] Hives           Objective     Blood pressure 138/81, pulse 81, temperature 98 °F (36 7 °C), temperature source Oral, resp  rate 18, height 5' 4" (1 626 m), weight 55 3 kg (122 lb), SpO2 97 %, not currently breastfeeding        Intake/Output Summary (Last 24 hours) at 03/25/18 8402  Last data filed at 03/25/18 0600   Gross per 24 hour   Intake           464 13 ml   Output             2615 ml   Net         -2150 87 ml         PHYSICAL EXAM:      General Appearance:   Alert, cooperative, no distress, appears stated age    HEENT:   Normocephalic, atraumatic, anicteric, no oropharyngeal thrush present      Neck:  Supple, symmetrical, trachea midline, no adenopathy;    thyroid: no enlargement/tenderness/nodules; no carotid  bruit or JVD    Lungs:   Clear to auscultation bilaterally; no rales, rhonchi or wheezing; respirations unlabored    Heart[de-identified]   S1 and S2 normal; regular rate and rhythm; no murmur, rub, or gallop  Abdomen:   Soft, non-tender, mildly distended; normal bowel sounds; no masses, no organomegaly; gastric cutaneous fistula present with drains containing bile tinged output    Genitalia:   Deferred    Rectal:   Deferred    Extremities:  No cyanosis, clubbing or edema    Pulses:  2+ and symmetric all extremities    Skin:  Skin color, texture, turgor normal, no rashes or lesions    Lymph nodes:  No palpable cervical, axillary or inguinal lymphadenopathy        Lab Results:     Results from last 7 days  Lab Units 03/24/18  0502   WBC Thousand/uL 8 53   HEMOGLOBIN g/dL 10 1*   HEMATOCRIT % 32 7*   PLATELETS Thousands/uL 211   NEUTROS PCT % 63   LYMPHS PCT % 20   MONOS PCT % 13*   EOS PCT % 3       Results from last 7 days  Lab Units 03/25/18  0538 03/24/18  0502   SODIUM mmol/L 138 135*   POTASSIUM mmol/L 3 8 3 6   CHLORIDE mmol/L 102 99*   CO2 mmol/L 32 29   BUN mg/dL 20 13   CREATININE mg/dL 0 43* 0 46*   CALCIUM mg/dL 10 7* 10 6*   TOTAL PROTEIN g/dL  --  6 2*   BILIRUBIN TOTAL mg/dL  --  0 53   ALK PHOS U/L  --  75   ALT U/L  --  8*   AST U/L  --  11   GLUCOSE RANDOM mg/dL 125 117               Imaging Studies: I have personally reviewed pertinent imaging studies  Ct Chest Abdomen Pelvis W Contrast    Result Date: 3/23/2018  Impression: Esophageal stent remains in place  Persistent but smaller anterior gastric leak now with a fistulous tract extending to the anterior mid skin surface  A percutaneous drainage catheter is present with in the collection at the site of leak  Small left basilar pleural effusion  Workstation performed: [id number]           Patient was seen and examined by Dr Meka Malik   All eller medical decisions were made by   Jann Hull Thank you for allowing us to participate in the care of this present patient  We will follow-up with you closely

## 2018-03-25 NOTE — PROGRESS NOTES
Pt was offered multiple times to get out of bed to sit in the chair  Multiple attempts were also made to walk pt by the nursing staff  Pt had very flat affect refusing and stating she was nauseated and in pain  Pt agreed in doing her incentive spirometry  Will continue to monitor and encourage pt

## 2018-03-26 ENCOUNTER — APPOINTMENT (INPATIENT)
Dept: RADIOLOGY | Facility: HOSPITAL | Age: 43
DRG: 252 | End: 2018-03-26
Payer: COMMERCIAL

## 2018-03-26 LAB
ANION GAP SERPL CALCULATED.3IONS-SCNC: 4 MMOL/L (ref 4–13)
BASOPHILS # BLD AUTO: 0.04 THOUSANDS/ΜL (ref 0–0.1)
BASOPHILS NFR BLD AUTO: 1 % (ref 0–1)
BUN SERPL-MCNC: 21 MG/DL (ref 5–25)
CALCIUM SERPL-MCNC: 10 MG/DL (ref 8.3–10.1)
CHLORIDE SERPL-SCNC: 104 MMOL/L (ref 100–108)
CO2 SERPL-SCNC: 30 MMOL/L (ref 21–32)
CREAT SERPL-MCNC: 0.4 MG/DL (ref 0.6–1.3)
EOSINOPHIL # BLD AUTO: 0.61 THOUSAND/ΜL (ref 0–0.61)
EOSINOPHIL NFR BLD AUTO: 8 % (ref 0–6)
ERYTHROCYTE [DISTWIDTH] IN BLOOD BY AUTOMATED COUNT: 16.8 % (ref 11.6–15.1)
GFR SERPL CREATININE-BSD FRML MDRD: 128 ML/MIN/1.73SQ M
GLUCOSE SERPL-MCNC: 119 MG/DL (ref 65–140)
GLUCOSE SERPL-MCNC: 122 MG/DL (ref 65–140)
GLUCOSE SERPL-MCNC: 129 MG/DL (ref 65–140)
HCT VFR BLD AUTO: 29.7 % (ref 34.8–46.1)
HGB BLD-MCNC: 9 G/DL (ref 11.5–15.4)
LYMPHOCYTES # BLD AUTO: 1.89 THOUSANDS/ΜL (ref 0.6–4.47)
LYMPHOCYTES NFR BLD AUTO: 24 % (ref 14–44)
MCH RBC QN AUTO: 26.6 PG (ref 26.8–34.3)
MCHC RBC AUTO-ENTMCNC: 30.3 G/DL (ref 31.4–37.4)
MCV RBC AUTO: 88 FL (ref 82–98)
MONOCYTES # BLD AUTO: 0.7 THOUSAND/ΜL (ref 0.17–1.22)
MONOCYTES NFR BLD AUTO: 9 % (ref 4–12)
NEUTROPHILS # BLD AUTO: 4.72 THOUSANDS/ΜL (ref 1.85–7.62)
NEUTS SEG NFR BLD AUTO: 58 % (ref 43–75)
NRBC BLD AUTO-RTO: 0 /100 WBCS
PLATELET # BLD AUTO: 229 THOUSANDS/UL (ref 149–390)
PMV BLD AUTO: 10.5 FL (ref 8.9–12.7)
POTASSIUM SERPL-SCNC: 3.7 MMOL/L (ref 3.5–5.3)
PREALB SERPL-MCNC: 12.6 MG/DL (ref 18–40)
RBC # BLD AUTO: 3.38 MILLION/UL (ref 3.81–5.12)
SODIUM SERPL-SCNC: 138 MMOL/L (ref 136–145)
WBC # BLD AUTO: 7.98 THOUSAND/UL (ref 4.31–10.16)

## 2018-03-26 PROCEDURE — 82948 REAGENT STRIP/BLOOD GLUCOSE: CPT

## 2018-03-26 PROCEDURE — 49424 ASSESS CYST CONTRAST INJECT: CPT

## 2018-03-26 PROCEDURE — 76937 US GUIDE VASCULAR ACCESS: CPT

## 2018-03-26 PROCEDURE — C9113 INJ PANTOPRAZOLE SODIUM, VIA: HCPCS | Performed by: SURGERY

## 2018-03-26 PROCEDURE — 76080 X-RAY EXAM OF FISTULA: CPT

## 2018-03-26 PROCEDURE — 36569 INSJ PICC 5 YR+ W/O IMAGING: CPT

## 2018-03-26 PROCEDURE — 85025 COMPLETE CBC W/AUTO DIFF WBC: CPT | Performed by: STUDENT IN AN ORGANIZED HEALTH CARE EDUCATION/TRAINING PROGRAM

## 2018-03-26 PROCEDURE — 77001 FLUOROGUIDE FOR VEIN DEVICE: CPT

## 2018-03-26 PROCEDURE — 76080 X-RAY EXAM OF FISTULA: CPT | Performed by: RADIOLOGY

## 2018-03-26 PROCEDURE — 84134 ASSAY OF PREALBUMIN: CPT | Performed by: STUDENT IN AN ORGANIZED HEALTH CARE EDUCATION/TRAINING PROGRAM

## 2018-03-26 PROCEDURE — 49424 ASSESS CYST CONTRAST INJECT: CPT | Performed by: RADIOLOGY

## 2018-03-26 PROCEDURE — 99231 SBSQ HOSP IP/OBS SF/LOW 25: CPT | Performed by: THORACIC SURGERY (CARDIOTHORACIC VASCULAR SURGERY)

## 2018-03-26 PROCEDURE — 80048 BASIC METABOLIC PNL TOTAL CA: CPT | Performed by: STUDENT IN AN ORGANIZED HEALTH CARE EDUCATION/TRAINING PROGRAM

## 2018-03-26 PROCEDURE — 99232 SBSQ HOSP IP/OBS MODERATE 35: CPT | Performed by: PHYSICIAN ASSISTANT

## 2018-03-26 PROCEDURE — 02HV33Z INSERTION OF INFUSION DEVICE INTO SUPERIOR VENA CAVA, PERCUTANEOUS APPROACH: ICD-10-PCS | Performed by: RADIOLOGY

## 2018-03-26 RX ORDER — FENTANYL CITRATE 50 UG/ML
INJECTION, SOLUTION INTRAMUSCULAR; INTRAVENOUS CODE/TRAUMA/SEDATION MEDICATION
Status: COMPLETED | OUTPATIENT
Start: 2018-03-26 | End: 2018-03-26

## 2018-03-26 RX ADMIN — IOHEXOL 10 ML: 300 INJECTION, SOLUTION INTRAVENOUS at 13:12

## 2018-03-26 RX ADMIN — HYDROMORPHONE HYDROCHLORIDE 0.5 MG: 1 INJECTION, SOLUTION INTRAMUSCULAR; INTRAVENOUS; SUBCUTANEOUS at 05:00

## 2018-03-26 RX ADMIN — HYDROMORPHONE HYDROCHLORIDE 1 MG: 1 INJECTION, SOLUTION INTRAMUSCULAR; INTRAVENOUS; SUBCUTANEOUS at 21:15

## 2018-03-26 RX ADMIN — HYDROMORPHONE HYDROCHLORIDE 1 MG: 1 INJECTION, SOLUTION INTRAMUSCULAR; INTRAVENOUS; SUBCUTANEOUS at 01:03

## 2018-03-26 RX ADMIN — HYDROMORPHONE HYDROCHLORIDE 0.5 MG: 1 INJECTION, SOLUTION INTRAMUSCULAR; INTRAVENOUS; SUBCUTANEOUS at 11:50

## 2018-03-26 RX ADMIN — HYDROMORPHONE HYDROCHLORIDE 0.5 MG: 1 INJECTION, SOLUTION INTRAMUSCULAR; INTRAVENOUS; SUBCUTANEOUS at 20:08

## 2018-03-26 RX ADMIN — METOCLOPRAMIDE 10 MG: 5 INJECTION, SOLUTION INTRAMUSCULAR; INTRAVENOUS at 04:59

## 2018-03-26 RX ADMIN — HYDROMORPHONE HYDROCHLORIDE 0.5 MG: 1 INJECTION, SOLUTION INTRAMUSCULAR; INTRAVENOUS; SUBCUTANEOUS at 23:16

## 2018-03-26 RX ADMIN — LORAZEPAM 0.5 MG: 2 INJECTION INTRAMUSCULAR; INTRAVENOUS at 20:07

## 2018-03-26 RX ADMIN — ONDANSETRON 4 MG: 2 INJECTION INTRAMUSCULAR; INTRAVENOUS at 08:45

## 2018-03-26 RX ADMIN — ONDANSETRON 4 MG: 2 INJECTION INTRAMUSCULAR; INTRAVENOUS at 01:03

## 2018-03-26 RX ADMIN — METOCLOPRAMIDE 10 MG: 5 INJECTION, SOLUTION INTRAMUSCULAR; INTRAVENOUS at 11:50

## 2018-03-26 RX ADMIN — HYDROMORPHONE HYDROCHLORIDE 0.5 MG: 1 INJECTION, SOLUTION INTRAMUSCULAR; INTRAVENOUS; SUBCUTANEOUS at 08:45

## 2018-03-26 RX ADMIN — HYDROMORPHONE HYDROCHLORIDE 0.5 MG: 1 INJECTION, SOLUTION INTRAMUSCULAR; INTRAVENOUS; SUBCUTANEOUS at 16:37

## 2018-03-26 RX ADMIN — HEPARIN SODIUM 5000 UNITS: 5000 INJECTION, SOLUTION INTRAVENOUS; SUBCUTANEOUS at 16:37

## 2018-03-26 RX ADMIN — CALCIUM GLUCONATE: 94 INJECTION, SOLUTION INTRAVENOUS at 21:15

## 2018-03-26 RX ADMIN — HEPARIN SODIUM 5000 UNITS: 5000 INJECTION, SOLUTION INTRAVENOUS; SUBCUTANEOUS at 21:14

## 2018-03-26 RX ADMIN — LORAZEPAM 0.5 MG: 2 INJECTION INTRAMUSCULAR; INTRAVENOUS at 11:50

## 2018-03-26 RX ADMIN — PANTOPRAZOLE SODIUM 40 MG: 40 INJECTION, POWDER, FOR SOLUTION INTRAVENOUS at 08:44

## 2018-03-26 RX ADMIN — LORAZEPAM 0.5 MG: 2 INJECTION INTRAMUSCULAR; INTRAVENOUS at 04:59

## 2018-03-26 RX ADMIN — ONDANSETRON 4 MG: 2 INJECTION INTRAMUSCULAR; INTRAVENOUS at 16:38

## 2018-03-26 RX ADMIN — FENTANYL CITRATE 50 MCG: 50 INJECTION INTRAMUSCULAR; INTRAVENOUS at 12:44

## 2018-03-26 RX ADMIN — HEPARIN SODIUM 5000 UNITS: 5000 INJECTION, SOLUTION INTRAVENOUS; SUBCUTANEOUS at 05:00

## 2018-03-26 RX ADMIN — ONDANSETRON 4 MG: 2 INJECTION INTRAMUSCULAR; INTRAVENOUS at 23:16

## 2018-03-26 NOTE — SEDATION DOCUMENTATION
5f double lumen PICC placed in left arm by [facility], no complications

## 2018-03-26 NOTE — PROGRESS NOTES
Pt very depressed and remorseful regarding original decision to have sx  Long conversation had encouraging pt to not dwell on the past, focus on positives (as best she can) like her family and the progress we are working to make, and work on getting better each day  Education reviewed regarding upcoming IR procedure  Will continue to monitor

## 2018-03-26 NOTE — PROCEDURES
Insert PICC line  Date/Time: 3/26/2018 1:09 PM  Performed by: Pedro Marsh by: Franck Stoner     Patient location:  IR  Other Assisting Provider: No    Consent:     Consent obtained:  Verbal and written    Consent given by:  Patient    Risks discussed:  Arterial puncture, bleeding, incorrect placement, infection, nerve damage and pneumothorax    Alternatives discussed:  No treatment, delayed treatment and alternative treatment  Universal protocol:     Procedure explained and questions answered to patient or proxy's satisfaction: yes      Relevant documents present and verified: yes      Test results available and properly labeled: yes      Imaging studies available: yes      Required blood products, implants, devices, and special equipment available: yes      Site/side marked: yes      Immediately prior to procedure, a time out was called: yes      Patient identity confirmed:  Verbally with patient, arm band and hospital-assigned identification number  Pre-procedure details:     Hand hygiene: Hand hygiene performed prior to insertion      Sterile barrier technique: All elements of maximal sterile technique followed      Skin preparation:  ChloraPrep    Skin preparation agent: Skin preparation agent completely dried prior to procedure    Indications:     PICC line indications: no peripheral vascular access    Anesthesia (see MAR for exact dosages):      Anesthesia method:  Local infiltration    Local anesthetic:  Lidocaine 1% w/o epi  Procedure details:     Location:  [place]    Vessel type: vein      Laterality:  Left    Approach: percutaneous technique used      Patient position:  Flat    Procedural supplies:  Double lumen    Catheter size:  5 Fr    Landmarks identified: yes      Ultrasound guidance: yes      Sterile ultrasound techniques: Sterile gel and sterile probe covers were used      Number of attempts:  1    Total catheter length (cm):  44    Catheter out on skin (cm):  0    Max flow rate: >999ml/hr    Arm circumference:  23  Post-procedure details:     Post-procedure:  Dressing applied and securement device placed    Assessment:  Blood return through all ports, no pneumothorax on x-ray, free fluid flow and placement verified by x-ray (IR fluro)    Post-procedure complications: none      Patient tolerance of procedure:   Tolerated well, no immediate complications

## 2018-03-26 NOTE — PROGRESS NOTES
Progress Note - Thoracic Surgery   Glenys Randall 36 y o  female MRN: [id number]  Unit/Bed#: [facility] 427-01 Encounter: [id number]    Assessment:  36 F with gastric perforation    Plan:  NPO  PICC/TPN  Continue drainage, IR for possible repositioning  GI vs general surgery for jejunal access-will assess today, otherwise will need general surgery consult  Anti emetics  IS/OOB/ambualte  SQH/SCDs    Subjective/Objective     Subjective: CECILLE  +BF  Nausea, no emesis  Pain controlled  Objective:    Blood pressure 111/60, pulse 91, temperature 98 1 °F (36 7 °C), temperature source Oral, resp  rate 18, height 5' 4" (1 626 m), weight 55 3 kg (122 lb), SpO2 97 %, not currently breastfeeding  ,Body mass index is 20 94 kg/m²  Intake/Output Summary (Last 24 hours) at 03/26/18 0543  Last data filed at 03/26/18 0501   Gross per 24 hour   Intake          2588 23 ml   Output             2140 ml   Net           448 23 ml       Invasive Devices     Peripherally Inserted Central Catheter Line            PICC Line 03/22/18 4 days          Peripheral Intravenous Line            Peripheral IV 03/23/18 Right Forearm 2 days          Drain            Closed/Suction Drain Left; Other (Comment) Abdomen Bulb -- days    Closed/Suction Drain Right Abdomen Other (Comment) -- days    Closed/Suction Drain Midline;Superior Abdomen Other (Comment) 10 Fr  37 days    NG/OG/Enteral Tube Nasogastric 8 Fr Left nares 30 days    Open Drain Midline Abdomen 21 days    Closed/Suction Drain Left;Lateral LUQ Bulb 12 Fr   19 days                Physical Exam:   NAD  AAOx3  Normal respiratory effort  Soft, NT, ND  Midline wound manager with clear yellow/bilious output and air  R ADENIKE with green bilious output  L ADENIKE with clear yellow output        Results from last 7 days  Lab Units 03/24/18  0502 03/23/18  0550 03/22/18  2335   WBC Thousand/uL 8 53 8 62 12 12*   HEMOGLOBIN g/dL 10 1* 10 1* 10 3*   HEMATOCRIT % 32 7* 31 7* 32 3*   PLATELETS Thousands/uL 211 233 226 Results from last 7 days  Lab Units 03/25/18  0538 03/24/18  0502 03/23/18  0550   SODIUM mmol/L 138 135* 138   POTASSIUM mmol/L 3 8 3 6 3 1*   CHLORIDE mmol/L 102 99* 101   CO2 mmol/L 32 29 31   BUN mg/dL 20 13 19   CREATININE mg/dL 0 43* 0 46* 0 49*   GLUCOSE RANDOM mg/dL 125 117 128   CALCIUM mg/dL 10 7* 10 6* 10 2*

## 2018-03-26 NOTE — PROGRESS NOTES
Progress Note- Dane Morales 36 y o  female MRN: [id number]    Unit/Bed#: [facility] 427-01 Encounter: [id number]      Assessment and Plan:    Gastrocutaneous fistula  History of gastric perforation  -S/P gastric perforation with s/p hiatal hernia repear at OSH 1/208  -S/P EGD with stent placement at OSH 1/24/18  -History of gastric sleeve 2017  -With current abdominal drainage, repositioned today by IR  -Currently on TPN, GI consulted for consideration of J tube placement  CT 3/23 shows distal esophageal stent extending into the lumen  Anterior gastric leak moderately decreased in size    -Will discuss with Dr Rupal Logan, may be more appropriate for surgically placed J tube given anatomy and esophageal stent  -Will discuss consideration of stent removal as well   _____________________________________________________________________  Subjective:  She has persistent pain in her chest which she attributes to the stent  She denies abdominal pain       Medication Administration - last 24 hours from 03/25/2018 1323 to 03/26/2018 1323       Date/Time Order Dose Route Action Action by     03/26/2018 0500 heparin (porcine) subcutaneous injection 5,000 Units 5,000 Units Subcutaneous Given Kay Parham RN     03/25/2018 2151 heparin (porcine) subcutaneous injection 5,000 Units 5,000 Units Subcutaneous Given J Luis Piper RN     03/25/2018 1400 heparin (porcine) subcutaneous injection 5,000 Units 5,000 Units Subcutaneous Given Karrie Leblanc RN     03/26/2018 0845 ondansetron (ZOFRAN) injection 4 mg 4 mg Intravenous Given Jim Edge RN     03/26/2018 0103 ondansetron (ZOFRAN) injection 4 mg 4 mg Intravenous Given Kay Parham RN     03/25/2018 1847 ondansetron (ZOFRAN) injection 4 mg 4 mg Intravenous Given Karrie Leblanc RN     03/26/2018 0844 pantoprazole (PROTONIX) injection 40 mg 40 mg Intravenous Given Jim Edge RN     03/26/2018 1150 LORazepam (ATIVAN) 2 mg/mL injection 0 5 mg 0 5 mg Intravenous Given There Gazejamesa, RN     03/26/2018 0459 LORazepam (ATIVAN) 2 mg/mL injection 0 5 mg 0 5 mg Intravenous Given Leonid Owen, RN     03/25/2018 2158 LORazepam (ATIVAN) 2 mg/mL injection 0 5 mg 0 5 mg Intravenous Given Dinah Double, RN     03/26/2018 0818 potassium chloride 20 mEq IVPB (premix) 0 mEq Intravenous Stopped [facility] Jonnathana, RN     03/26/2018 1150 HYDROmorphone (DILAUDID) injection 0 5 mg 0 5 mg Intravenous Given [facility] Jonnathana, RN     03/26/2018 0845 HYDROmorphone (DILAUDID) injection 0 5 mg 0 5 mg Intravenous Given [facility] Myrtlea, RN     03/26/2018 0500 HYDROmorphone (DILAUDID) injection 0 5 mg 0 5 mg Intravenous Given Leonid Owen, RN     03/25/2018 2158 HYDROmorphone (DILAUDID) injection 0 5 mg 0 5 mg Intravenous Given Dinah Double, RN     03/25/2018 1847 HYDROmorphone (DILAUDID) injection 0 5 mg 0 5 mg Intravenous Given Abiolasiddharth Asencioliliya, RN     03/25/2018 1539 HYDROmorphone (DILAUDID) injection 0 5 mg 0 5 mg Intravenous Given Iline Grandchild, RN     03/26/2018 0103 HYDROmorphone (DILAUDID) injection 1 mg 1 mg Intravenous Given Leonid Owen, RN     03/26/2018 6739 insulin lispro (HumaLOG) 100 units/mL subcutaneous injection 1-5 Units 1 Units Subcutaneous Not Given Leonid Owen, RN     03/26/2018 0010 insulin lispro (HumaLOG) 100 units/mL subcutaneous injection 1-5 Units 1 Units Subcutaneous Not Given Leonid Owen, RN     03/25/2018 1847 insulin lispro (HumaLOG) 100 units/mL subcutaneous injection 1-5 Units 1 Units Subcutaneous Not Given Abiolane Grandchild, RN     03/26/2018 1150 metoclopramide (REGLAN) injection 10 mg 10 mg Intravenous Given [facility] Brian, RN     03/26/2018 0459 metoclopramide (REGLAN) injection 10 mg 10 mg Intravenous Given Leonid Owen, RN     03/25/2018 2158 metoclopramide (REGLAN) injection 10 mg 10 mg Intravenous Given Dinah Kate, RN     03/25/2018 1540 metoclopramide (REGLAN) injection 10 mg 10 mg Intravenous Given Cherelle Dinh Ontiveros, RN     03/25/2018 2141 Adult 3-in-1 TPN (custom base / standard electrolytes)   Intravenous Stopped Gumaro Ding, MANDIE     03/26/2018 1137 potassium chloride 20 mEq IVPB (premix) 0 mEq Intravenous [address], RN     03/25/2018 2151 Adult 3-in-1 TPN (custom base / standard electrolytes)   Intravenous New Bag Gumaro Ding, MANDIE     03/26/2018 1244 fentanyl citrate (PF) 100 MCG/2ML 50 mcg Intravenous Given Rosanna Gross RN     03/26/2018 1312 iohexol (OMNIPAQUE) 300 mg/mL injection 50 mL 10 mL Other Given Anna Gay          Objective:     Vitals: Blood pressure 126/66, pulse 91, temperature 99 4 °F (37 4 °C), temperature source Oral, resp  rate 18, height 5' 4" (1 626 m), weight 55 3 kg (122 lb), SpO2 98 %, not currently breastfeeding  ,Body mass index is 20 94 kg/m²  Intake/Output Summary (Last 24 hours) at 03/26/18 1323  Last data filed at 03/26/18 0801   Gross per 24 hour   Intake          1067 17 ml   Output             2340 ml   Net         -1272 83 ml       Physical Exam:   General Appearance: Awake and alert, in no acute distress  Abdomen: Soft, non-tender, non-distended; bowel sounds normal-midline wound drain with green output    Invasive Devices     Peripherally Inserted Central Catheter Line            PICC Line 03/22/18 4 days    PICC Line 02/60/78 Left Basilic less than 1 day          Peripheral Intravenous Line            Peripheral IV 03/23/18 Right Forearm 2 days          Drain            Closed/Suction Drain Left; Other (Comment) Abdomen Bulb -- days    Closed/Suction Drain Right Abdomen Other (Comment) -- days    Closed/Suction Drain Midline;Superior Abdomen Other (Comment) 10 Fr  37 days    NG/OG/Enteral Tube Nasogastric 8 Fr Left nares 30 days    Open Drain Midline Abdomen 21 days    Closed/Suction Drain Left;Lateral LUQ Bulb 12 Fr   19 days                Lab Results:  Admission on 03/22/2018   Component Date Value    Sodium 03/22/2018 138     Potassium 03/22/2018 3 5     Chloride 03/22/2018 101     CO2 03/22/2018 32     Anion Gap 03/22/2018 5     BUN 03/22/2018 21     Creatinine 03/22/2018 0 44*    Glucose 03/22/2018 92     Calcium 03/22/2018 10 4*    eGFR 03/22/2018 124     WBC 03/22/2018 12 12*    RBC 03/22/2018 3 74*    Hemoglobin 03/22/2018 10 3*    Hematocrit 03/22/2018 32 3*    MCV 03/22/2018 86     MCH 03/22/2018 27 5     MCHC 03/22/2018 31 9     RDW 03/22/2018 16 4*    MPV 03/22/2018 9 6     Platelets 92/45/8945 226     nRBC 03/22/2018 0     Neutrophils Relative 03/22/2018 73     Lymphocytes Relative 03/22/2018 18     Monocytes Relative 03/22/2018 7     Eosinophils Relative 03/22/2018 2     Basophils Relative 03/22/2018 0     Neutrophils Absolute 03/22/2018 8 81*    Lymphocytes Absolute 03/22/2018 2 15     Monocytes Absolute 03/22/2018 0 87     Eosinophils Absolute 03/22/2018 0 21     Basophils Absolute 03/22/2018 0 04     Magnesium 03/22/2018 2 1     POC Glucose 03/23/2018 92     Sodium 03/23/2018 138     Potassium 03/23/2018 3 1*    Chloride 03/23/2018 101     CO2 03/23/2018 31     Anion Gap 03/23/2018 6     BUN 03/23/2018 19     Creatinine 03/23/2018 0 49*    Glucose 03/23/2018 128     Calcium 03/23/2018 10 2*    eGFR 03/23/2018 120     WBC 03/23/2018 8 62     RBC 03/23/2018 3 65*    Hemoglobin 03/23/2018 10 1*    Hematocrit 03/23/2018 31 7*    MCV 03/23/2018 87     MCH 03/23/2018 27 7     MCHC 03/23/2018 31 9     RDW 03/23/2018 16 4*    MPV 03/23/2018 9 9     Platelets 54/06/4584 233     nRBC 03/23/2018 0     Neutrophils Relative 03/23/2018 64     Lymphocytes Relative 03/23/2018 23     Monocytes Relative 03/23/2018 10     Eosinophils Relative 03/23/2018 2     Basophils Relative 03/23/2018 1     Neutrophils Absolute 03/23/2018 5 59     Lymphocytes Absolute 03/23/2018 2 01     Monocytes Absolute 03/23/2018 0 82     Eosinophils Absolute 03/23/2018 0 13     Basophils Absolute 03/23/2018 0 04  Magnesium 03/23/2018 2 1     POC Glucose 03/23/2018 129     Prealbumin 03/24/2018 17 8*    POC Glucose 03/23/2018 115     POC Glucose 03/23/2018 76     POC Glucose 03/24/2018 145*    Sodium 03/24/2018 135*    Potassium 03/24/2018 3 6     Chloride 03/24/2018 99*    CO2 03/24/2018 29     Anion Gap 03/24/2018 7     BUN 03/24/2018 13     Creatinine 03/24/2018 0 46*    Glucose 03/24/2018 117     Calcium 03/24/2018 10 6*    eGFR 03/24/2018 122     Total Bilirubin 03/24/2018 0 53     Bilirubin, Direct 03/24/2018 0 13     Alkaline Phosphatase 03/24/2018 75     AST 03/24/2018 11     ALT 03/24/2018 8*    Total Protein 03/24/2018 6 2*    Albumin 03/24/2018 2 3*    WBC 03/24/2018 8 53     RBC 03/24/2018 3 79*    Hemoglobin 03/24/2018 10 1*    Hematocrit 03/24/2018 32 7*    MCV 03/24/2018 86     MCH 03/24/2018 26 6*    MCHC 03/24/2018 30 9*    RDW 03/24/2018 16 4*    MPV 03/24/2018 10 4     Platelets 11/18/2982 211     nRBC 03/24/2018 0     Neutrophils Relative 03/24/2018 63     Lymphocytes Relative 03/24/2018 20     Monocytes Relative 03/24/2018 13*    Eosinophils Relative 03/24/2018 3     Basophils Relative 03/24/2018 1     Neutrophils Absolute 03/24/2018 5 45     Lymphocytes Absolute 03/24/2018 1 67     Monocytes Absolute 03/24/2018 1 08     Eosinophils Absolute 03/24/2018 0 25     Basophils Absolute 03/24/2018 0 05     Magnesium 03/24/2018 2 1     POC Glucose 03/24/2018 119     POC Glucose 03/24/2018 173*    POC Glucose 03/24/2018 125     POC Glucose 03/25/2018 160*    Sodium 03/25/2018 138     Potassium 03/25/2018 3 8     Chloride 03/25/2018 102     CO2 03/25/2018 32     Anion Gap 03/25/2018 4     BUN 03/25/2018 20     Creatinine 03/25/2018 0 43*    Glucose 03/25/2018 125     Calcium 03/25/2018 10 7*    eGFR 03/25/2018 125     Magnesium 03/25/2018 2 2     Phosphorus 03/25/2018 3 4     POC Glucose 03/25/2018 129     POC Glucose 03/25/2018 155*    POC Glucose 03/25/2018 127     POC Glucose 03/25/2018 133     WBC 03/26/2018 7 98     RBC 03/26/2018 3 38*    Hemoglobin 03/26/2018 9 0*    Hematocrit 03/26/2018 29 7*    MCV 03/26/2018 88     MCH 03/26/2018 26 6*    MCHC 03/26/2018 30 3*    RDW 03/26/2018 16 8*    MPV 03/26/2018 10 5     Platelets 49/45/6112 229     nRBC 03/26/2018 0     Neutrophils Relative 03/26/2018 58     Lymphocytes Relative 03/26/2018 24     Monocytes Relative 03/26/2018 9     Eosinophils Relative 03/26/2018 8*    Basophils Relative 03/26/2018 1     Neutrophils Absolute 03/26/2018 4 72     Lymphocytes Absolute 03/26/2018 1 89     Monocytes Absolute 03/26/2018 0 70     Eosinophils Absolute 03/26/2018 0 61     Basophils Absolute 03/26/2018 0 04     Sodium 03/26/2018 138     Potassium 03/26/2018 3 7     Chloride 03/26/2018 104     CO2 03/26/2018 30     Anion Gap 03/26/2018 4     BUN 03/26/2018 21     Creatinine 03/26/2018 0 40*    Glucose 03/26/2018 122     Calcium 03/26/2018 10 0     eGFR 03/26/2018 128     Prealbumin 03/26/2018 12 6*    POC Glucose 03/26/2018 129        Imaging Studies: I have personally reviewed pertinent imaging studies

## 2018-03-27 LAB
ANION GAP SERPL CALCULATED.3IONS-SCNC: 8 MMOL/L (ref 4–13)
BUN SERPL-MCNC: 20 MG/DL (ref 5–25)
CALCIUM SERPL-MCNC: 9.7 MG/DL (ref 8.3–10.1)
CHLORIDE SERPL-SCNC: 101 MMOL/L (ref 100–108)
CO2 SERPL-SCNC: 30 MMOL/L (ref 21–32)
CREAT SERPL-MCNC: 0.41 MG/DL (ref 0.6–1.3)
GFR SERPL CREATININE-BSD FRML MDRD: 127 ML/MIN/1.73SQ M
GLUCOSE SERPL-MCNC: 119 MG/DL (ref 65–140)
GLUCOSE SERPL-MCNC: 143 MG/DL (ref 65–140)
GLUCOSE SERPL-MCNC: 143 MG/DL (ref 65–140)
GLUCOSE SERPL-MCNC: 148 MG/DL (ref 65–140)
GLUCOSE SERPL-MCNC: 157 MG/DL (ref 65–140)
GLUCOSE SERPL-MCNC: 70 MG/DL (ref 65–140)
INR PPP: 1.2 (ref 0.86–1.16)
MAGNESIUM SERPL-MCNC: 2 MG/DL (ref 1.6–2.6)
MYCOBACTERIUM SPEC CULT: NORMAL
PHOSPHATE SERPL-MCNC: 3 MG/DL (ref 2.7–4.5)
POTASSIUM SERPL-SCNC: 3.8 MMOL/L (ref 3.5–5.3)
PROTHROMBIN TIME: 15.3 SECONDS (ref 12.1–14.4)
RHODAMINE-AURAMINE STN SPEC: NORMAL
SODIUM SERPL-SCNC: 139 MMOL/L (ref 136–145)

## 2018-03-27 PROCEDURE — 85610 PROTHROMBIN TIME: CPT | Performed by: STUDENT IN AN ORGANIZED HEALTH CARE EDUCATION/TRAINING PROGRAM

## 2018-03-27 PROCEDURE — C9113 INJ PANTOPRAZOLE SODIUM, VIA: HCPCS | Performed by: SURGERY

## 2018-03-27 PROCEDURE — 80048 BASIC METABOLIC PNL TOTAL CA: CPT | Performed by: STUDENT IN AN ORGANIZED HEALTH CARE EDUCATION/TRAINING PROGRAM

## 2018-03-27 PROCEDURE — 99232 SBSQ HOSP IP/OBS MODERATE 35: CPT | Performed by: INTERNAL MEDICINE

## 2018-03-27 PROCEDURE — 99231 SBSQ HOSP IP/OBS SF/LOW 25: CPT | Performed by: THORACIC SURGERY (CARDIOTHORACIC VASCULAR SURGERY)

## 2018-03-27 PROCEDURE — 82948 REAGENT STRIP/BLOOD GLUCOSE: CPT

## 2018-03-27 PROCEDURE — 83735 ASSAY OF MAGNESIUM: CPT | Performed by: STUDENT IN AN ORGANIZED HEALTH CARE EDUCATION/TRAINING PROGRAM

## 2018-03-27 PROCEDURE — 84100 ASSAY OF PHOSPHORUS: CPT | Performed by: STUDENT IN AN ORGANIZED HEALTH CARE EDUCATION/TRAINING PROGRAM

## 2018-03-27 RX ADMIN — HYDROMORPHONE HYDROCHLORIDE 1 MG: 1 INJECTION, SOLUTION INTRAMUSCULAR; INTRAVENOUS; SUBCUTANEOUS at 00:57

## 2018-03-27 RX ADMIN — HEPARIN SODIUM 5000 UNITS: 5000 INJECTION, SOLUTION INTRAVENOUS; SUBCUTANEOUS at 22:10

## 2018-03-27 RX ADMIN — HEPARIN SODIUM 5000 UNITS: 5000 INJECTION, SOLUTION INTRAVENOUS; SUBCUTANEOUS at 05:58

## 2018-03-27 RX ADMIN — HYDROMORPHONE HYDROCHLORIDE 1 MG: 1 INJECTION, SOLUTION INTRAMUSCULAR; INTRAVENOUS; SUBCUTANEOUS at 14:55

## 2018-03-27 RX ADMIN — PANTOPRAZOLE SODIUM 40 MG: 40 INJECTION, POWDER, FOR SOLUTION INTRAVENOUS at 08:30

## 2018-03-27 RX ADMIN — ONDANSETRON 4 MG: 2 INJECTION INTRAMUSCULAR; INTRAVENOUS at 15:04

## 2018-03-27 RX ADMIN — HEPARIN SODIUM 5000 UNITS: 5000 INJECTION, SOLUTION INTRAVENOUS; SUBCUTANEOUS at 14:55

## 2018-03-27 RX ADMIN — LORAZEPAM 0.5 MG: 2 INJECTION INTRAMUSCULAR; INTRAVENOUS at 22:05

## 2018-03-27 RX ADMIN — INSULIN LISPRO 1 UNITS: 100 INJECTION, SOLUTION INTRAVENOUS; SUBCUTANEOUS at 06:07

## 2018-03-27 RX ADMIN — HYDROMORPHONE HYDROCHLORIDE 1 MG: 1 INJECTION, SOLUTION INTRAMUSCULAR; INTRAVENOUS; SUBCUTANEOUS at 08:32

## 2018-03-27 RX ADMIN — LORAZEPAM 0.5 MG: 2 INJECTION INTRAMUSCULAR; INTRAVENOUS at 04:19

## 2018-03-27 RX ADMIN — ONDANSETRON 4 MG: 2 INJECTION INTRAMUSCULAR; INTRAVENOUS at 22:05

## 2018-03-27 RX ADMIN — LORAZEPAM 0.5 MG: 2 INJECTION INTRAMUSCULAR; INTRAVENOUS at 11:31

## 2018-03-27 RX ADMIN — ONDANSETRON 4 MG: 2 INJECTION INTRAMUSCULAR; INTRAVENOUS at 08:31

## 2018-03-27 RX ADMIN — METOCLOPRAMIDE 10 MG: 5 INJECTION, SOLUTION INTRAMUSCULAR; INTRAVENOUS at 04:18

## 2018-03-27 RX ADMIN — HYDROMORPHONE HYDROCHLORIDE 1 MG: 1 INJECTION, SOLUTION INTRAMUSCULAR; INTRAVENOUS; SUBCUTANEOUS at 11:34

## 2018-03-27 RX ADMIN — CALCIUM GLUCONATE: 94 INJECTION, SOLUTION INTRAVENOUS at 22:05

## 2018-03-27 RX ADMIN — HYDROMORPHONE HYDROCHLORIDE 1 MG: 1 INJECTION, SOLUTION INTRAMUSCULAR; INTRAVENOUS; SUBCUTANEOUS at 20:03

## 2018-03-27 RX ADMIN — HYDROMORPHONE HYDROCHLORIDE 0.5 MG: 1 INJECTION, SOLUTION INTRAMUSCULAR; INTRAVENOUS; SUBCUTANEOUS at 04:11

## 2018-03-27 NOTE — NUTRITION
03/27/18 1456   Recommendations/Interventions   Summary anticipate j-tube tomorrow for nutrition support; TPN meets estimated needs   Interventions PN continue as ordered   Nutrition Recommendations Tube Feeding Recommendation provided; Other (specify)  (continue current TPN rx at this time; initiate EN when appropriate; rec jevity 1 2 start at 20ml/hr & incr by 20ml q 4 hours to goal of 70ml/hr; flush with 110ml water q 4 hrs; wean off PN as EN increases; goal EN = 2016kcal, 93gpro, 2021ml fw)

## 2018-03-27 NOTE — PROGRESS NOTES
Progress Note - Thoracic Surgery   Makayla Yao 36 y o  female MRN: [id number]  Unit/Bed#: [facility] 427-01 Encounter: [id number]    Assessment:  36 F with gastric perforation    Plan:  NPO  PICC/TPN  Continue drainage - repositioned yesterday by IR  Plan for J tube with GI today  OOB ambulate  SQ    Subjective/Objective     Subjective: No acute events  Some abdominal pain  Objective:    Blood pressure 127/74, pulse 89, temperature 99 °F (37 2 °C), temperature source Oral, resp  rate 18, height 5' 4" (1 626 m), weight 55 3 kg (122 lb), SpO2 96 %, not currently breastfeeding  ,Body mass index is 20 94 kg/m²  Intake/Output Summary (Last 24 hours) at 03/27/18 0614  Last data filed at 03/27/18 0600   Gross per 24 hour   Intake          2260 88 ml   Output             3815 ml   Net         -1554 12 ml       Invasive Devices     Peripherally Inserted Central Catheter Line            PICC Line 62/74/40 Left Basilic less than 1 day          Drain            Closed/Suction Drain Left; Other (Comment) Abdomen Bulb -- days    Closed/Suction Drain Right Abdomen Other (Comment) -- days    Closed/Suction Drain Midline;Superior Abdomen Other (Comment) 10 Fr  38 days    NG/OG/Enteral Tube Nasogastric 8 Fr Left nares 31 days    Open Drain Midline Abdomen 22 days    Closed/Suction Drain Left;Lateral LUQ Bulb 12 Fr  20 days                Physical Exam:   General: NAD, AAOx3  CV: RRR +S1/S2  Chest: breath sounds bilaterally  Abdomen: soft, mildly tender, colostomy bag over fistula, drains in place mixed output  Extremities: atraumatic, no edema        Results from last 7 days  Lab Units 03/26/18  0442 03/24/18  0502 03/23/18  0550   WBC Thousand/uL 7 98 8 53 8 62   HEMOGLOBIN g/dL 9 0* 10 1* 10 1*   HEMATOCRIT % 29 7* 32 7* 31 7*   PLATELETS Thousands/uL 229 211 233       Results from last 7 days  Lab Units 03/26/18  0442 03/25/18  0538 03/24/18  0502   SODIUM mmol/L 138 138 135*   POTASSIUM mmol/L 3 7 3 8 3 6   CHLORIDE mmol/L 104 102 99*   CO2 mmol/L 30 32 29   BUN mg/dL 21 20 13   CREATININE mg/dL 0 40* 0 43* 0 46*   GLUCOSE RANDOM mg/dL 122 125 117   CALCIUM mg/dL 10 0 10 7* 10 6*

## 2018-03-27 NOTE — CASE MANAGEMENT
Thank you,  [address] [facility] in the [place] by Donald Villa for 2017  Network Utilization Review Department  Phone: [phone]; Fax [phone]  ATTENTION: The Network Utilization Review Department is now centralized for our 7 Facilities  Make a note that we have a new phone and fax numbers for our Department  Please call with any questions or concerns to [phone] and carefully follow the prompts so that you are directed to the right person  All voicemails are confidential  Fax any determinations, approvals, denials, and requests for initial or continue stay review clinical to [phone]   Due to HIGH CALL volume, it would be easier if you could please send faxed requests to expedite your requests and in part, help us provide discharge notifications faster     =================================================================    Continued Stay Review    Date: 03/27/2018    Vital Signs: /66 (BP Location: Right arm)   Pulse 98   Temp 98 °F (36 7 °C) (Axillary)   Resp 20   Ht 5' 4" (1 626 m)   Wt 55 3 kg (122 lb)   LMP  (LMP Unknown)   SpO2 99%   BMI 20 94 kg/m²     Medications:   Scheduled Meds:   Current Facility-Administered Medications:  Adult TPN (CUSTOM BASE/STANDARD ELECTROLYTE)  Intravenous Continuous Last Rate: 67 4 mL/hr at 03/26/18 2115   Adult TPN (CUSTOM BASE/STANDARD ELECTROLYTE)  Intravenous Continuous    heparin (porcine) 5,000 Units Subcutaneous Q8H [facility]    HYDROmorphone 0 5 mg Intravenous Q3H PRN    HYDROmorphone 1 mg Intravenous Q3H PRN    insulin lispro 1-5 Units Subcutaneous Q6H MYRNA    LORazepam 0 5 mg Intravenous Q6H PRN    metoclopramide 10 mg Intravenous Q6H PRN    ondansetron 4 mg Intravenous Q6H PRN    pantoprazole 40 mg Intravenous Q24H [facility]      Continuous Infusions:   Adult TPN (CUSTOM BASE/STANDARD ELECTROLYTE)  Last Rate: 67 4 mL/hr at 03/26/18 2115   Adult TPN (CUSTOM BASE/STANDARD ELECTROLYTE)       Abnormal Labs/Diagnostic Results:     Age/Sex: 36 y o  female     Assessment/Plan:   Assessment:  36 F with gastric perforation     Plan:  NPO  PICC/TPN  Continue drainage - repositioned yesterday by IR  Plan for J tube with GI today  OOB ambulate  SQH  Discharge Plan: TBD

## 2018-03-27 NOTE — CASE MANAGEMENT
Hello,     Can you please update the DOS for this admission  Via portal you have:  [id number]*IP Kay Other [id number] 3/23/2018 3/27/2018 Approved     However, Patient came in 03/22/2018  Admission, Transfer, Discharge Orders   Comment  Rachell Valencia      Last edited by  on  at    Start   Ordered   03/22/18 2156  Inpatient Admission Once     Transfer Service: Thoracic Surgery       Question Answer Comment   Admitting Physician Julian Izaguirre    Level of Care Med Surg    Estimated length of stay More than 2 Midnights    Certification I certify that inpatient services are medically necessary for this patient for a duration of greater than two midnights  See H&P and MD Progress Notes for additional information about the patient's course of treatment  03/22/18 2157     Thank you,  [address] [facility] in the [facility] by Donald Villa for 2017  Network Utilization Review Department  Phone: [phone]; Fax [phone]  ATTENTION: The Network Utilization Review Department is now centralized for our 7 Facilities  Make a note that we have a new phone and fax numbers for our Department  Please call with any questions or concerns to [phone] and carefully follow the prompts so that you are directed to the right person  All voicemails are confidential  Fax any determinations, approvals, denials, and requests for initial or continue stay review clinical to [phone]  Due to HIGH CALL volume, it would be easier if you could please send faxed requests to expedite your requests and in part, help us provide discharge notifications faster

## 2018-03-27 NOTE — PROGRESS NOTES
Progress Note - Blas Vazquez 36 y o  female MRN: [id number]    Unit/Bed#: [facility] 427-01 Encounter: [id number]        Subjective:     No acute events  She was scheduled for procedure today however due to scheduling we were not able to add offer today  We will plan for tomorrow  Objective:     Vitals: Blood pressure 118/70, pulse 85, temperature 98 5 °F (36 9 °C), temperature source Oral, resp  rate 18, height 5' 4" (1 626 m), weight 55 3 kg (122 lb), SpO2 97 %, not currently breastfeeding  ,Body mass index is 20 94 kg/m²  Intake/Output Summary (Last 24 hours) at 03/27/18 1902  Last data filed at 03/27/18 1839   Gross per 24 hour   Intake           942 48 ml   Output             3380 ml   Net         -2437 52 ml       Physical Exam:     General Appearance: Alert, appears stated age and cooperative  Appears chronically ill  Lungs: Clear to auscultation bilaterally, no rales or rhonchi, no labored breathing/accessory muscle use  Heart: Regular rate and rhythm, S1, S2 normal, no murmur, click, rub or gallop  Abdomen: Soft, non-tender, non-distended; bowel sounds normal; no masses or no organomegaly  Ostomy bag in place  Extremities: No cyanosis, edema    Invasive Devices     Peripherally Inserted Central Catheter Line            PICC Line 32/20/99 Left Basilic 1 day          Drain            Closed/Suction Drain Left; Other (Comment) Abdomen Bulb -- days    Closed/Suction Drain Right Abdomen Other (Comment) -- days    Closed/Suction Drain Midline;Superior Abdomen Other (Comment) 10 Fr  38 days    NG/OG/Enteral Tube Nasogastric 8 Fr Left nares 32 days    Open Drain Midline Abdomen 22 days    Closed/Suction Drain Left;Lateral LUQ Bulb 12 Fr  21 days                Lab Results:      Results from last 7 days  Lab Units 03/26/18  0442   WBC Thousand/uL 7 98   HEMOGLOBIN g/dL 9 0*   HEMATOCRIT % 29 7*   PLATELETS Thousands/uL 229   NEUTROS PCT % 58   LYMPHS PCT % 24   MONOS PCT % 9   EOS PCT % 8*       Results from last 7 days  Lab Units 03/27/18  1742  03/24/18  0502   SODIUM mmol/L 139  < > 135*   POTASSIUM mmol/L 3 8  < > 3 6   CHLORIDE mmol/L 101  < > 99*   CO2 mmol/L 30  < > 29   BUN mg/dL 20  < > 13   CREATININE mg/dL 0 41*  < > 0 46*   CALCIUM mg/dL 9 7  < > 10 6*   TOTAL PROTEIN g/dL  --   --  6 2*   BILIRUBIN TOTAL mg/dL  --   --  0 53   ALK PHOS U/L  --   --  75   ALT U/L  --   --  8*   AST U/L  --   --  11   GLUCOSE RANDOM mg/dL 70  < > 117   < > = values in this interval not displayed  Results from last 7 days  Lab Units 03/27/18  1034   INR  1 20*           Imaging Studies: I have personally reviewed pertinent imaging studies  Ct Chest Abdomen Pelvis W Contrast    Result Date: 3/23/2018  Impression: Esophageal stent remains in place  Persistent but smaller anterior gastric leak now with a fistulous tract extending to the anterior mid skin surface  A percutaneous drainage catheter is present with in the collection at the site of leak  Small left basilar pleural effusion  Workstation performed: [id number]     Ir Picc Line    Result Date: 3/26/2018  Impression: 1  Status post placement of a 5-Beninese double-lumen central venous catheter via the left basilic vein with its tip at the cavoatrial junction under ultrasound and fluoroscopic guidance  Workstation performed: [id number]     Ir Other Tube Change    Result Date: 3/26/2018  Impression: Impression: For scopic evaluation and repositioning of indwelling midline 10-Beninese drain more superficially  Workstation performed: [id number]       ASSESMENT/ PLAN:     1  Severe protein calorie malnutrition secondary to inability to eat due to GE junction perforation after sleeve gastrectomy  We will plan for attempt at percutaneous endoscopic jejunostomy tube placement tomorrow  The procedure was discussed with the patient and her family at length  They are agreeable to proceed    All the risks including bleeding, perforation and leakage and alternatives including continue TPN were discussed  Continue TPN for the time being    Plan discussed with the primary team

## 2018-03-28 ENCOUNTER — ANESTHESIA EVENT (INPATIENT)
Dept: GASTROENTEROLOGY | Facility: HOSPITAL | Age: 43
DRG: 252 | End: 2018-03-28
Payer: COMMERCIAL

## 2018-03-28 ENCOUNTER — ANESTHESIA (INPATIENT)
Dept: GASTROENTEROLOGY | Facility: HOSPITAL | Age: 43
DRG: 252 | End: 2018-03-28
Payer: COMMERCIAL

## 2018-03-28 LAB
ALBUMIN SERPL BCP-MCNC: 2 G/DL (ref 3.5–5)
ALP SERPL-CCNC: 69 U/L (ref 46–116)
ALT SERPL W P-5'-P-CCNC: 8 U/L (ref 12–78)
ANION GAP SERPL CALCULATED.3IONS-SCNC: 5 MMOL/L (ref 4–13)
AST SERPL W P-5'-P-CCNC: 11 U/L (ref 5–45)
BASOPHILS # BLD AUTO: 0.05 THOUSANDS/ΜL (ref 0–0.1)
BASOPHILS NFR BLD AUTO: 1 % (ref 0–1)
BILIRUB DIRECT SERPL-MCNC: 0.11 MG/DL (ref 0–0.2)
BILIRUB SERPL-MCNC: 0.26 MG/DL (ref 0.2–1)
BUN SERPL-MCNC: 21 MG/DL (ref 5–25)
CALCIUM SERPL-MCNC: 9.1 MG/DL (ref 8.3–10.1)
CHLORIDE SERPL-SCNC: 104 MMOL/L (ref 100–108)
CO2 SERPL-SCNC: 30 MMOL/L (ref 21–32)
CREAT SERPL-MCNC: 0.4 MG/DL (ref 0.6–1.3)
EOSINOPHIL # BLD AUTO: 0.46 THOUSAND/ΜL (ref 0–0.61)
EOSINOPHIL NFR BLD AUTO: 6 % (ref 0–6)
ERYTHROCYTE [DISTWIDTH] IN BLOOD BY AUTOMATED COUNT: 16.7 % (ref 11.6–15.1)
GFR SERPL CREATININE-BSD FRML MDRD: 128 ML/MIN/1.73SQ M
GLUCOSE SERPL-MCNC: 119 MG/DL (ref 65–140)
GLUCOSE SERPL-MCNC: 125 MG/DL (ref 65–140)
GLUCOSE SERPL-MCNC: 136 MG/DL (ref 65–140)
GLUCOSE SERPL-MCNC: 138 MG/DL (ref 65–140)
HCT VFR BLD AUTO: 32.1 % (ref 34.8–46.1)
HGB BLD-MCNC: 9.6 G/DL (ref 11.5–15.4)
LYMPHOCYTES # BLD AUTO: 1.64 THOUSANDS/ΜL (ref 0.6–4.47)
LYMPHOCYTES NFR BLD AUTO: 20 % (ref 14–44)
MAGNESIUM SERPL-MCNC: 1.9 MG/DL (ref 1.6–2.6)
MCH RBC QN AUTO: 26.7 PG (ref 26.8–34.3)
MCHC RBC AUTO-ENTMCNC: 29.9 G/DL (ref 31.4–37.4)
MCV RBC AUTO: 89 FL (ref 82–98)
MONOCYTES # BLD AUTO: 0.89 THOUSAND/ΜL (ref 0.17–1.22)
MONOCYTES NFR BLD AUTO: 11 % (ref 4–12)
NEUTROPHILS # BLD AUTO: 5.34 THOUSANDS/ΜL (ref 1.85–7.62)
NEUTS SEG NFR BLD AUTO: 62 % (ref 43–75)
NRBC BLD AUTO-RTO: 0 /100 WBCS
PHOSPHATE SERPL-MCNC: 3.3 MG/DL (ref 2.7–4.5)
PLATELET # BLD AUTO: 217 THOUSANDS/UL (ref 149–390)
PMV BLD AUTO: 9.7 FL (ref 8.9–12.7)
POTASSIUM SERPL-SCNC: 4 MMOL/L (ref 3.5–5.3)
PROT SERPL-MCNC: 5.8 G/DL (ref 6.4–8.2)
RBC # BLD AUTO: 3.6 MILLION/UL (ref 3.81–5.12)
SODIUM SERPL-SCNC: 139 MMOL/L (ref 136–145)
TRIGL SERPL-MCNC: 88 MG/DL
WBC # BLD AUTO: 8.41 THOUSAND/UL (ref 4.31–10.16)

## 2018-03-28 PROCEDURE — C9113 INJ PANTOPRAZOLE SODIUM, VIA: HCPCS | Performed by: SURGERY

## 2018-03-28 PROCEDURE — 80076 HEPATIC FUNCTION PANEL: CPT | Performed by: STUDENT IN AN ORGANIZED HEALTH CARE EDUCATION/TRAINING PROGRAM

## 2018-03-28 PROCEDURE — 97166 OT EVAL MOD COMPLEX 45 MIN: CPT

## 2018-03-28 PROCEDURE — G8988 SELF CARE GOAL STATUS: HCPCS

## 2018-03-28 PROCEDURE — G8979 MOBILITY GOAL STATUS: HCPCS

## 2018-03-28 PROCEDURE — 82948 REAGENT STRIP/BLOOD GLUCOSE: CPT

## 2018-03-28 PROCEDURE — G8987 SELF CARE CURRENT STATUS: HCPCS

## 2018-03-28 PROCEDURE — 0DC28ZZ EXTIRPATION OF MATTER FROM MIDDLE ESOPHAGUS, VIA NATURAL OR ARTIFICIAL OPENING ENDOSCOPIC: ICD-10-PCS | Performed by: INTERNAL MEDICINE

## 2018-03-28 PROCEDURE — 80048 BASIC METABOLIC PNL TOTAL CA: CPT | Performed by: STUDENT IN AN ORGANIZED HEALTH CARE EDUCATION/TRAINING PROGRAM

## 2018-03-28 PROCEDURE — 97163 PT EVAL HIGH COMPLEX 45 MIN: CPT

## 2018-03-28 PROCEDURE — 99231 SBSQ HOSP IP/OBS SF/LOW 25: CPT | Performed by: THORACIC SURGERY (CARDIOTHORACIC VASCULAR SURGERY)

## 2018-03-28 PROCEDURE — 43247 EGD REMOVE FOREIGN BODY: CPT | Performed by: INTERNAL MEDICINE

## 2018-03-28 PROCEDURE — 85025 COMPLETE CBC W/AUTO DIFF WBC: CPT | Performed by: STUDENT IN AN ORGANIZED HEALTH CARE EDUCATION/TRAINING PROGRAM

## 2018-03-28 PROCEDURE — 84478 ASSAY OF TRIGLYCERIDES: CPT | Performed by: STUDENT IN AN ORGANIZED HEALTH CARE EDUCATION/TRAINING PROGRAM

## 2018-03-28 PROCEDURE — 84100 ASSAY OF PHOSPHORUS: CPT | Performed by: STUDENT IN AN ORGANIZED HEALTH CARE EDUCATION/TRAINING PROGRAM

## 2018-03-28 PROCEDURE — 83735 ASSAY OF MAGNESIUM: CPT | Performed by: STUDENT IN AN ORGANIZED HEALTH CARE EDUCATION/TRAINING PROGRAM

## 2018-03-28 PROCEDURE — G8978 MOBILITY CURRENT STATUS: HCPCS

## 2018-03-28 RX ORDER — FENTANYL CITRATE 50 UG/ML
INJECTION, SOLUTION INTRAMUSCULAR; INTRAVENOUS AS NEEDED
Status: DISCONTINUED | OUTPATIENT
Start: 2018-03-28 | End: 2018-03-28 | Stop reason: SURG

## 2018-03-28 RX ORDER — SODIUM CHLORIDE 9 MG/ML
INJECTION, SOLUTION INTRAVENOUS CONTINUOUS PRN
Status: DISCONTINUED | OUTPATIENT
Start: 2018-03-28 | End: 2018-03-28 | Stop reason: SURG

## 2018-03-28 RX ORDER — PROPOFOL 10 MG/ML
INJECTION, EMULSION INTRAVENOUS AS NEEDED
Status: DISCONTINUED | OUTPATIENT
Start: 2018-03-28 | End: 2018-03-28 | Stop reason: SURG

## 2018-03-28 RX ORDER — PROPOFOL 10 MG/ML
INJECTION, EMULSION INTRAVENOUS CONTINUOUS PRN
Status: DISCONTINUED | OUTPATIENT
Start: 2018-03-28 | End: 2018-03-28 | Stop reason: SURG

## 2018-03-28 RX ORDER — CEFAZOLIN SODIUM 1 G/3ML
INJECTION, POWDER, FOR SOLUTION INTRAMUSCULAR; INTRAVENOUS AS NEEDED
Status: DISCONTINUED | OUTPATIENT
Start: 2018-03-28 | End: 2018-03-28 | Stop reason: SURG

## 2018-03-28 RX ADMIN — HYDROMORPHONE HYDROCHLORIDE 1 MG: 1 INJECTION, SOLUTION INTRAMUSCULAR; INTRAVENOUS; SUBCUTANEOUS at 00:43

## 2018-03-28 RX ADMIN — HYDROMORPHONE HYDROCHLORIDE 0.5 MG: 1 INJECTION, SOLUTION INTRAMUSCULAR; INTRAVENOUS; SUBCUTANEOUS at 09:20

## 2018-03-28 RX ADMIN — CEFAZOLIN 2000 MG: 1 INJECTION, POWDER, FOR SOLUTION INTRAVENOUS at 13:53

## 2018-03-28 RX ADMIN — HEPARIN SODIUM 5000 UNITS: 5000 INJECTION, SOLUTION INTRAVENOUS; SUBCUTANEOUS at 21:58

## 2018-03-28 RX ADMIN — ONDANSETRON 4 MG: 2 INJECTION INTRAMUSCULAR; INTRAVENOUS at 21:59

## 2018-03-28 RX ADMIN — HEPARIN SODIUM 5000 UNITS: 5000 INJECTION, SOLUTION INTRAVENOUS; SUBCUTANEOUS at 05:12

## 2018-03-28 RX ADMIN — HEPARIN SODIUM 5000 UNITS: 5000 INJECTION, SOLUTION INTRAVENOUS; SUBCUTANEOUS at 16:49

## 2018-03-28 RX ADMIN — METOCLOPRAMIDE 10 MG: 5 INJECTION, SOLUTION INTRAMUSCULAR; INTRAVENOUS at 05:11

## 2018-03-28 RX ADMIN — PANTOPRAZOLE SODIUM 40 MG: 40 INJECTION, POWDER, FOR SOLUTION INTRAVENOUS at 09:21

## 2018-03-28 RX ADMIN — PROPOFOL 50 MG: 10 INJECTION, EMULSION INTRAVENOUS at 13:48

## 2018-03-28 RX ADMIN — CALCIUM GLUCONATE: 94 INJECTION, SOLUTION INTRAVENOUS at 22:59

## 2018-03-28 RX ADMIN — HYDROMORPHONE HYDROCHLORIDE 1 MG: 1 INJECTION, SOLUTION INTRAMUSCULAR; INTRAVENOUS; SUBCUTANEOUS at 21:58

## 2018-03-28 RX ADMIN — FENTANYL CITRATE 50 MCG: 50 INJECTION, SOLUTION INTRAMUSCULAR; INTRAVENOUS at 13:43

## 2018-03-28 RX ADMIN — HYDROMORPHONE HYDROCHLORIDE 0.5 MG: 1 INJECTION, SOLUTION INTRAMUSCULAR; INTRAVENOUS; SUBCUTANEOUS at 18:06

## 2018-03-28 RX ADMIN — HYDROMORPHONE HYDROCHLORIDE 0.5 MG: 1 INJECTION, SOLUTION INTRAMUSCULAR; INTRAVENOUS; SUBCUTANEOUS at 03:08

## 2018-03-28 RX ADMIN — LORAZEPAM 0.5 MG: 2 INJECTION INTRAMUSCULAR; INTRAVENOUS at 03:07

## 2018-03-28 RX ADMIN — LORAZEPAM 0.5 MG: 2 INJECTION INTRAMUSCULAR; INTRAVENOUS at 21:59

## 2018-03-28 RX ADMIN — METOCLOPRAMIDE 10 MG: 5 INJECTION, SOLUTION INTRAMUSCULAR; INTRAVENOUS at 18:52

## 2018-03-28 RX ADMIN — PROPOFOL 100 MCG/KG/MIN: 10 INJECTION, EMULSION INTRAVENOUS at 13:45

## 2018-03-28 RX ADMIN — ONDANSETRON 4 MG: 2 INJECTION INTRAMUSCULAR; INTRAVENOUS at 03:07

## 2018-03-28 RX ADMIN — PROPOFOL 120 MG: 10 INJECTION, EMULSION INTRAVENOUS at 13:45

## 2018-03-28 RX ADMIN — SODIUM CHLORIDE: 0.9 INJECTION, SOLUTION INTRAVENOUS at 13:36

## 2018-03-28 RX ADMIN — HYDROMORPHONE HYDROCHLORIDE 1 MG: 1 INJECTION, SOLUTION INTRAMUSCULAR; INTRAVENOUS; SUBCUTANEOUS at 05:11

## 2018-03-28 NOTE — PLAN OF CARE
Problem: PHYSICAL THERAPY ADULT  Goal: Performs mobility at highest level of function for planned discharge setting  See evaluation for individualized goals  Treatment/Interventions: Functional transfer training, LE strengthening/ROM, Elevations, Therapeutic exercise, Endurance training, Bed mobility, Gait training, Equipment eval/education, Spoke to nursing, Spoke to case management, OT  Equipment Recommended:  (TBD)       See flowsheet documentation for full assessment, interventions and recommendations  Prognosis: Guarded  Problem List: Decreased strength, Decreased endurance, Impaired balance, Decreased mobility  Assessment: Pt is 36 y o  female admitted for management of her complex surgical condition ( recent hx of gastric perforation s/p gastric sleeve, s/p multiple drains and exploratory laparotomy with multiple washouts with esophageal stenting and removal of a  shunt, nasal jejunal tube discontinued and TPN started)  Current impression include: Gastric perforation, status post stenting, IR drainage, and gastrocutaneous fistula controlled with a wound drainage bag  Pt 's comorbidities affecting POC include: Pseudotumor Cerebri, migraine, rotator cuff tendinitis,  shunt, and gastric bypass and personal factors of: KAVITA and recently undergoing rehab  Pt's clinical presentation is currently unstable/unpredictable which is evident in ongoing pain and guarding, abn lab values, and additional pending procedures  Pt presents w/ pain and guarding, generalized weakness, incl decreased LE strength, decreased functional endurance and activity tolerance, min impaired balance and associated gait deviations (will introduce rw next visit) and fall risk  Will cont to follow pt in PT for progressive mobilization to address above functional deficits and to max level of (I), endurance, and safety   Currently recommend cont w/ rehab upon D/C but will cont to follow and make appropriate changes to D/C recommendations as needed;  Barriers to Discharge: Inaccessible home environment     Recommendation: Post acute IP rehab (will cont to monitor progress however)          See flowsheet documentation for full assessment

## 2018-03-28 NOTE — ANESTHESIA POSTPROCEDURE EVALUATION
Post-Op Assessment Note      CV Status:  Stable    Mental Status:  Alert and awake    Hydration Status:  Stable    PONV Controlled:  None    Airway Patency:  Patent    Post Op Vitals Reviewed: Yes          Staff: AnesthesiologistPOLO           /67 (03/28/18 1428)    Temp      Pulse 95 (03/28/18 1428)   Resp 16 (03/28/18 1428)    SpO2 99 % (03/28/18 1428)

## 2018-03-28 NOTE — PLAN OF CARE
Problem: OCCUPATIONAL THERAPY ADULT  Goal: Performs self-care activities at highest level of function for planned discharge setting  See evaluation for individualized goals  Treatment Interventions: ADL retraining, Functional transfer training, UE strengthening/ROM, Endurance training, Cognitive reorientation, Patient/family training, Equipment evaluation/education, Compensatory technique education, Continued evaluation, Energy conservation, Activityengagement          See flowsheet documentation for full assessment, interventions and recommendations  Limitation: Decreased ADL status, Decreased UE ROM, Decreased UE strength, Decreased Safe judgement during ADL, Decreased cognition, Decreased endurance, Decreased self-care trans, Decreased high-level ADLs  Prognosis: Fair  Assessment: Pt is a 37 y/o female seen for OT eval s/p adm to [facility] for further management of her complex surgical condition  Pt adm earlier this month after gastric perforation s/p gastric sleeve at an outside institution  She had multiple drains and exploratory laparotomy with multiple washouts with esophageal stenting and removal of a  shunt  A nasal jejunal tube was placed and pt was d/c to rehab 3/9  Pt is now readm due to tube feed coming from the midline wound  Pt is  dx'd w/ gastric perforation  Comorbidities include a h/o brain condition, migraine, obesity, lumboperitoneal shunt, rotator cuff tendinitis, and visual field deficit  Pt with active OT orders  Pt lives with fiance and 15 y/o son in 1 story home w/ 2 KAVITA  Pt was I w/  ADLS and IADLS, drove, & required no use of DME PTA  Pt is currently demonstrating the following occupational deficits: S UB and LB ADLS, Min A transfers and functional mobility   Pt with deficits and limitations in all baseline areas of occupation 2* pain, fatigue, decreased activity tolerance, decreased functional mobility, depression, decreased motivation, decreased strength and deconditioning, generalized weakness, and decreased I w/ ADLS and IADLS compared to previous level of functioning  The following Occupational Performance Areas to address include: grooming, bathing/shower, toilet hygiene, dressing, medication management, socialization, health maintenance, functional mobility, community mobility, clothing management, cleaning, meal prep, money management, household maintenance, care of children and social participation  Pt scored overall 55/100 on the Barthel Index  Based on the aforementioned OT evaluation, functional performance deficits, and assessments, pt has been identified as a moderate complexity evaluation  Recommend pt d/c to home w/ Home OT and increased family support when medically stable    Pt to continue to benefit from acute immediate OT services to address the following goals 3-5x/wk to  w/in 10-14 days:  Recommendation: Other (comment) (Neuropsych- for psychotherapy & coping strategies)  OT Discharge Recommendation: Home OT  OT - OK to Discharge: Yes (when medically stable)      Comments: Diann Gonzalez MOT, OTR/L

## 2018-03-28 NOTE — PROGRESS NOTES
Progress Note - Thoracic Surgery   Linda Beyer 36 y o  female MRN: [id number]  Unit/Bed#: [facility] 427-01 Encounter: [id number]    Assessment:  36 F with gastric perforation s/p stenting, drainage, now with gastrocutaneous fistula    Plan:  PEJ today with GI  Continue TPN until enteral access stable and tolerating enteral nutrition  Continue wound manager and drains    Subjective/Objective     Subjective: No acute events  Still with some epigastric pain  Discussed PEJ tube with GI and is agreeable  Objective:    Blood pressure 122/75, pulse 86, temperature 97 5 °F (36 4 °C), temperature source Oral, resp  rate 18, height 5' 4" (1 626 m), weight 55 3 kg (122 lb), SpO2 99 %, not currently breastfeeding  ,Body mass index is 20 94 kg/m²  Intake/Output Summary (Last 24 hours) at 03/28/18 0605  Last data filed at 03/28/18 0043   Gross per 24 hour   Intake          1082 89 ml   Output             2250 ml   Net         -1167 11 ml       Invasive Devices     Peripherally Inserted Central Catheter Line            PICC Line 33/22/14 Left Basilic 1 day          Drain            Closed/Suction Drain Left; Other (Comment) Abdomen Bulb -- days    Closed/Suction Drain Right Abdomen Other (Comment) -- days    Closed/Suction Drain Midline;Superior Abdomen Other (Comment) 10 Fr  39 days    NG/OG/Enteral Tube Nasogastric 8 Fr Left nares 32 days    Open Drain Midline Abdomen 23 days    Closed/Suction Drain Left;Lateral LUQ Bulb 12 Fr  21 days                Physical Exam:   General: NAD, AAOx3  CV: RRR +S1/S2  Chest: breath sounds bilaterally  Abdomen: soft, mild epigastric tenderness, non-distended   Wound manager and midline in place with mixed contents, left flank drain in place with serous fluid in tubing  Extremities: atraumatic, no edema        Results from last 7 days  Lab Units 03/28/18  0517 03/26/18  0442 03/24/18  0502   WBC Thousand/uL 8 41 7 98 8 53   HEMOGLOBIN g/dL 9 6* 9 0* 10 1*   HEMATOCRIT % 32 1* 29 7* 32 7* PLATELETS Thousands/uL 217 229 211       Results from last 7 days  Lab Units 03/27/18  1742 03/26/18  0442 03/25/18  0538   SODIUM mmol/L 139 138 138   POTASSIUM mmol/L 3 8 3 7 3 8   CHLORIDE mmol/L 101 104 102   CO2 mmol/L 30 30 32   BUN mg/dL 20 21 20   CREATININE mg/dL 0 41* 0 40* 0 43*   GLUCOSE RANDOM mg/dL 70 122 125   CALCIUM mg/dL 9 7 10 0 10 7*       Results from last 7 days  Lab Units 03/27/18  1034   INR  1 20*

## 2018-03-28 NOTE — PHYSICAL THERAPY NOTE
Physical Therapy Evaluation     Patient's Name: Ata Williamson    Admitting Diagnosis  Gastric perforation ([address] ) [K25 5]    Problem List  Patient Active Problem List   Diagnosis    Pseudotumor cerebri    S/P  shunt    Occipital headache    Brain condition    Gastric leak    Acute peritonitis    Idiopathic intracranial hypertension    S/P  shunt    Infection of  (ventriculoperitoneal) shunt, subsequent encounter    Murmur, cardiac    Refusal of blood product    Gastroesophageal reflux disease    Choroidal nevus, right eye    Moderate protein-calorie malnutrition ([address] )    Gastric perforation ([address] )    Postoperative intra-abdominal abscess ([address] )    Abdominal wound dehiscence    Neutrophilic leukocytosis    Left-sided chest wall pain       Past Medical History  Past Medical History:   Diagnosis Date    Brain condition     Pseudotumor Cerebri     Migraine     Obesity     Papilledema, both eyes     Presence of lumboperitoneal shunt     Resolved: Sep 20, 2017    Rotator cuff tendinitis     Resolved: Aug 23, 2017    Visual field defect        Past Surgical History  Past Surgical History:   Procedure Laterality Date    CSF SHUNT      LP shunt - 2015 -  shunt - 2017    ESOPHAGOGASTRODUODENOSCOPY N/A 2/23/2018    Procedure: ESOPHAGOGASTRODUODENOSCOPY (EGD) WITH ESOPHAGEAL STENT PLACEMENT;  Surgeon: Mian Olivia MD;  [facility];  Service: Thoracic    ESOPHAGOGASTRODUODENOSCOPY N/A 2/23/2018    Procedure: ESOPHAGOGASTRODUODENOSCOPY (EGD) WITH REMOVAL ESOPHAGEAL STENT  AND REPLACEMENT WITH 23mm X155mm [address];  Surgeon: Mian Olivia MD;  [facility];  Service: Thoracic    ESOPHAGOSCOPY WITH STENT INSERTION N/A 1/24/2018    Procedure: INSERTION STENT ESOPHAGEAL;  Surgeon: Matias Cruz MD;  [facility];   Service: Gastroenterology    EYE SURGERY      for "crossed eyed" (in 2nd grade)     GASTRIC BYPASS  2016    HYSTERECTOMY  LAPAROTOMY N/A 1/25/2018    Procedure: Exploratory Laparotomy, wash out,placement of drains, placement of NG feeding tube ; Surgeon: Hanna Fischer DO;  [facility];  Service: General    WA CREATE SHUNT:VENTRIC-PERITONEAL Right 5/31/2017    Procedure: IMAGE GUIDED CORONAL PLACEMENT OF PROGRAMABLE VENTRICULAR-PERITONEAL SHUNT, REMOVAL OF LP SHUNT ;  Surgeon: Karon Heath MD;  [facility];  Service: Neurosurgery    WA Kristie Henry CSF SHUNT,W/O REPLACE Right 2/5/2018    Procedure: Removal of  shunt;  Surgeon: Karon Heath MD;  [facility];  Service: Neurosurgery    WA REPLACEMENT/REVISION,CSF SHUNT Right 1/25/2018    Procedure: Externalization of right-sided SHUNT VENTRICULAR-PERITONEAL in anterior chest wall ribs two and three level  ;  Surgeon: Melinda Weinstein MD;  [facility];  Service: Neurosurgery          03/28/18 0903   Note Type   Note type Eval only   Pain Assessment   Pain Assessment 0-10   Pain Score Worst Possible Pain   Pain Type Acute pain   Pain Location Back; Chest   Pain Orientation Mid   Pain Descriptors Aching;Discomfort   Pain Frequency Intermittent   Pain Onset Gradual   Clinical Progression Gradually improving   Effect of Pain on Daily Activities initial discomfort w/ repositioning   Patient's Stated Pain Goal No pain   Hospital Pain Intervention(s) Repositioned; Ambulation/increased activity; Distraction; Emotional support   Response to Interventions appears to be more comfortable at the end of session   Pain Rating: FLACC (Rest) - Face 1   Pain Rating: FLACC (Rest) - Legs 0   Pain Rating: FLACC (Rest) - Activity 0   Pain Rating: FLACC (Rest) - Cry 1   Pain Rating: FLACC (Rest) - Consolability 1   Score: FLACC (Rest) 3   Pain Rating: FLACC (Activity) - Face 1   Pain Rating: FLACC (Activity) - Legs 0   Pain Rating: FLACC (Activity) - Activity 0   Pain Rating: FLACC (Activity) - Cry 1   Pain Rating: FLACC (Activity) - Consolability 1   Score: FLACC (Activity) 3   Home Living   Type of [address] [place] [address] One level  (2 KAVITA w/ hand rail)   Additional Comments pt was reportedly undegoing rehab prior to admission   Prior Function   Level of [place] Independent with ADLs and functional mobility  (amb w/o AD)   Lives With Significant other; Son   Dearl Rimma Help From Family   Restrictions/Precautions   Braces or Orthoses (denies)   Other Precautions Cognitive;Telemetry;Multiple lines; Fall Risk;Pain  (ADENIKE x2; bed alarm activated at the end of session)   General   Additional Pertinent History cleared for assessment (spoke to nsg)   Cognition   Overall Cognitive Status [facility]   Arousal/Participation Responsive   Attention Attends with cues to redirect   Orientation Level Oriented to person;Oriented to place;Oriented to situation   Memory Decreased recall of recent events   Following Commands Follows one step commands without difficulty   Comments Pt is resting in bed; flat affect; reports pain; agreeable to mobilize and reposition to try facilitating comfort   RUE Assessment   RUE Assessment WFL  (AROM)   LUE Assessment   LUE Assessment WFL  (AROM)   RLE Assessment   RLE Assessment WFL  (AROM)   Strength RLE   RLE Overall Strength 4-/5   LLE Assessment   LLE Assessment WFL  (AROM)   Strength LLE   LLE Overall Strength 4-/5   Bed Mobility   Rolling R 4  Minimal assistance   Additional items Assist x 1;Verbal cues   Supine to Sit 4  Minimal assistance   Additional items Assist x 1;HOB elevated;LE management;Verbal cues   Sit to Supine 4  Minimal assistance   Additional items Assist x 1;Verbal cues   Transfers   Sit to Stand 4  Minimal assistance   Additional items Assist x 1;Verbal cues   Stand to Sit 4  Minimal assistance   Additional items Assist x 1;Verbal cues   Ambulation/Elevation   Gait pattern Excessively slow; Short stride   Gait Assistance 4  Minimal assist   Additional items Assist x 1;Verbal cues; Tactile cues  (stand by (A) of 2nd for lines) Assistive Device None  (r/o rw next visit for further distances)   Distance 4 ft total distance at bedside; further amb was not performed due to overall status/discomfort;   Balance   Static Sitting Fair   Static Standing Fair -   Dynamic Standing Poor +   Ambulatory Poor +   Activity Tolerance   Activity Tolerance Patient limited by fatigue;Patient limited by pain   Nurse Made Aware spoke to Debbie Upton RN   Assessment   Prognosis Guarded   Problem List Decreased strength;Decreased endurance; Impaired balance;Decreased mobility   Assessment Pt is 36 y o  female admitted for management of her complex surgical condition ( recent hx of gastric perforation s/p gastric sleeve, s/p multiple drains and exploratory laparotomy with multiple washouts with esophageal stenting and removal of a  shunt, nasal jejunal tube discontinued and TPN started)  Current impression include: Gastric perforation, status post stenting, IR drainage, and gastrocutaneous fistula controlled with a wound drainage bag  Pt 's comorbidities affecting POC include: Pseudotumor Cerebri, migraine, rotator cuff tendinitis,  shunt, and gastric bypass and personal factors of: KAVITA and recently undergoing rehab  Pt's clinical presentation is currently unstable/unpredictable which is evident in ongoing pain and guarding, abn lab values, and additional pending procedures  Pt presents w/ pain and guarding, generalized weakness, incl decreased LE strength, decreased functional endurance and activity tolerance, min impaired balance and associated gait deviations (will introduce rw next visit) and fall risk  Will cont to follow pt in PT for progressive mobilization to address above functional deficits and to max level of (I), endurance, and safety   Currently recommend cont w/ rehab upon D/C but will cont to follow and make appropriate changes to D/C recommendations as needed;   Barriers to Discharge Inaccessible home environment   Goals   Patient Goals to have something for pain   LTG Expiration Date 04/11/18   Long Term Goal #1 10-14 days  Pt will amb 300 ft w/ least restrictive assistive device PRN, mod (I)  Pt will negotiate 2 steps w/ hand rail and SPC PRN, mod (I)  Pt will achieve (I) level w/ bed mob  Pt will perform transfers w/ mod (I)  Plan   Treatment/Interventions Functional transfer training;LE strengthening/ROM; Elevations; Therapeutic exercise; Endurance training;Bed mobility;Gait training;Equipment eval/education;Spoke to nursing;Spoke to case management;OT   PT Frequency 5x/wk   Recommendation   Recommendation Post acute IP rehab  (will cont to monitor progress however)   Equipment Recommended (TBD)   Modified [place] Scale   Modified Sendy Scale 4   Barthel Index   Feeding 0   Bathing 5   Grooming Score 5   Dressing Score 10   Bladder Score 10   Bowels Score 10   Toilet Use Score 5   Transfers (Bed/Chair) Score 10   Mobility (Level Surface) Score 0   Stairs Score 0   Barthel Index Score 55       Melchor Partida, PT

## 2018-03-28 NOTE — OP NOTE
**** GI/ENDOSCOPY REPORT ****     PATIENT NAME: AIDEE NICK - VISIT ID:     INTRODUCTION: Esophagogastroduodenoscopy - A 36 female patient presents   for an inpatient Esophagogastroduodenoscopy at [address] [place] [address]  INDICATIONS: GE junction perforation status post stent placement  Now for   evaluation for J tube placement  CONSENT: The benefits, risks, and alternatives to the procedure were   discussed and informed consent was obtained from the patient  PREPARATION:  EKG, pulse, pulse oximetry and blood pressure were monitored   throughout the procedure  ASA Classification: Class 3 - Patient has severe   systemic disturbance that may or may not be related to the disorder   requiring surgery  MEDICATIONS: Anesthesia-check records     PROCEDURE:  The endoscope was passed without difficulty through the mouth   under direct visualization and advanced to the 2nd portion of the   duodenum  The scope was withdrawn and the mucosa was carefully examined  FINDINGS:   Esophagus: The proximal esophagus appeared normal  Two stents   were seen in the midesophagus extending into the stomach  At 1st the   gastroscope was passed through the stents into the stomach and then into   the duodenum and jejunum  This was an attempt to put a percutaneous   endoscopic jejunostomy tube  However I was unable to identify   transillumination  Hence the J-tube was not done  After the procedure I   removed the esophageal stents using a forceps  After the esophageal stents   were removed I evaluated the area again and did see a perforation at the   distal esophagus /GE junction  There was a suture in this area as well  A   clip was seen  There was significant esophagitis in the distal esophagus  Stomach: There was a area of heaped up mucosa likely granulation tissue In   the proximal gastric body  the rest of the stomach appeared to be   unremarkable    Duodenum: The duodenum appeared to be normal  The jejunum   was normal  No transillumination could be performed hence a J-tube was not   inserted  I even used a pediatric colonoscope was not able to traverse the   duodenum without significant resistance hence this was aborted  COMPLICATIONS: There were no complications  IMPRESSIONS: Esophageal stents were removed  Significant esophagitis in   the distal esophagus along with GE junction perforation identified  There   was a clip in this area  This was discussed with Dr Mallika Lazar  granulation tissue at the proximal gastric body  digital ostomy few could   not be placed due to poor transillumination  RECOMMENDATIONS: Keep NPO  Will discuss closure of the defect with either   over the scope clip or Endo suturing  ESTIMATED BLOOD LOSS:     PATHOLOGY SPECIMENS:     PROCEDURE CODES:     ICD-9 Codes:     ICD-10 Codes:     PERFORMED BY: FRANCOIS Ceron  on 03/28/2018  Version 1, electronically signed by FRANCOIS Ceron  on 03/28/2018 at   14:38

## 2018-03-28 NOTE — ANESTHESIA PREPROCEDURE EVALUATION
Review of Systems/Medical History  Patient summary reviewed  Chart reviewed  No history of anesthetic complications     Cardiovascular    Comment: ECHO 1/ 2018--EF 37%, normal systolic fxn, no reg abnml,  Pulmonary  Smoker ex-smoker  ,        GI/Hepatic    GERD ,   Comment: Hx laparoscopic paraesophageal hernia repair, gastric injury during surgery--s/p esophageal stent, dev  Sepsis req multiple surgical procedures, On jejunal feedings     Negative  ROS        Endo/Other    Comment: Right eye choroidal nevus    GYN  Negative gynecology ROS          Hematology  Negative hematology ROS      Musculoskeletal  Negative musculoskeletal ROS   Arthritis     Neurology    Headaches,   Comment: Idiopathic intracranial hypertension--s/p  shunt 5/30/2017--in light of current sepsis, neurosurgery rec  removing shunt Psychology   Negative psychology ROS              Physical Exam    Airway    Mallampati score: II  TM Distance: >3 FB       Dental   No notable dental hx     Cardiovascular  Rhythm: regular,     Pulmonary  Breath sounds clear to auscultation,     Other Findings        Anesthesia Plan  ASA Score- 3     Anesthesia Type- general and IV sedation with anesthesia with ASA Monitors  Additional Monitors:   Airway Plan: ETT  Comment: H/H 9/28, K 3 9, Cr  3  Plan Factors-Patient not instructed to abstain from smoking on day of procedure       Induction- intravenous  Postoperative Plan-     Informed Consent- Anesthetic plan and risks discussed with patient  I personally reviewed this patient with the CRNA  Discussed and agreed on the Anesthesia Plan with the CRNA  Anton Perez

## 2018-03-28 NOTE — OCCUPATIONAL THERAPY NOTE
[address]  Evaluation     Patient Name: Andrea Terry  MWGGA'A Date: 3/28/2018  Problem List  Patient Active Problem List   Diagnosis    Pseudotumor cerebri    S/P  shunt    Occipital headache    Brain condition    Gastric leak    Acute peritonitis    Idiopathic intracranial hypertension    S/P  shunt    Infection of  (ventriculoperitoneal) shunt, subsequent encounter    Murmur, cardiac    Refusal of blood product    Gastroesophageal reflux disease    Choroidal nevus, right eye    Moderate protein-calorie malnutrition (HCC)    Gastric perforation (HCC)    Postoperative intra-abdominal abscess ([address] )    Abdominal wound dehiscence    Neutrophilic leukocytosis    Left-sided chest wall pain     Past Medical History  Past Medical History:   Diagnosis Date    Brain condition     Pseudotumor Cerebri     Migraine     Obesity     Papilledema, both eyes     Presence of lumboperitoneal shunt     Resolved: Sep 20, 2017    Rotator cuff tendinitis     Resolved: Aug 23, 2017    Visual field defect      Past Surgical History  Past Surgical History:   Procedure Laterality Date    CSF SHUNT      LP shunt - 2015 -  shunt - 2017    ESOPHAGOGASTRODUODENOSCOPY N/A 2/23/2018    Procedure: ESOPHAGOGASTRODUODENOSCOPY (EGD) WITH ESOPHAGEAL STENT PLACEMENT;  Surgeon: Belia Marshall MD;  [facility];  Service: Thoracic    ESOPHAGOGASTRODUODENOSCOPY N/A 2/23/2018    Procedure: ESOPHAGOGASTRODUODENOSCOPY (EGD) WITH REMOVAL ESOPHAGEAL STENT  AND REPLACEMENT WITH 23mm X155mm 1111 [address] [place] [address],[address];  Surgeon: Belia aMrshall MD;  [facility];  Service: Thoracic    ESOPHAGOSCOPY WITH STENT INSERTION N/A 1/24/2018    Procedure: INSERTION STENT ESOPHAGEAL;  Surgeon: Ricky Suarez MD;  [facility];   Service: Gastroenterology    EYE SURGERY      for "crossed eyed" (in 2nd grade)     GASTRIC BYPASS  2016    HYSTERECTOMY      LAPAROTOMY N/A 1/25/2018 Procedure: Exploratory Laparotomy, wash out,placement of drains, placement of NG feeding tube ; Surgeon: Ashok Pierce DO;  [facility];  Service: General    MN CREATE SHUNT:VENTRIC-PERITONEAL Right 5/31/2017    Procedure: IMAGE GUIDED CORONAL PLACEMENT OF PROGRAMABLE VENTRICULAR-PERITONEAL SHUNT, REMOVAL OF LP SHUNT ;  Surgeon: Barbara Diallo MD;  [facility];  Service: Neurosurgery    MN Venus Pallas CSF SHUNT,W/O REPLACE Right 2/5/2018    Procedure: Removal of  shunt;  Surgeon: Barbara Diallo MD;  [facility];  Service: Neurosurgery    MN REPLACEMENT/REVISION,CSF SHUNT Right 1/25/2018    Procedure: Externalization of right-sided SHUNT VENTRICULAR-PERITONEAL in anterior chest wall ribs two and three level  ;  Surgeon: Leona Moore MD;  [facility];  Service: Neurosurgery         03/28/18 0902   Note Type   Note type Eval/Treat   Restrictions/Precautions   Weight Bearing Precautions Per Order No   Other Precautions Cognitive; Bed Alarm;Multiple lines; Fall Risk;Pain   Pain Assessment   Pain Assessment 0-10   Pain Score Worst Possible Pain   Pain Type Acute pain   Pain Location Back; Chest   Pain Orientation Mid   Pain Descriptors Aching;Discomfort   Pain Frequency Intermittent   Pain Onset Gradual   Clinical Progression Gradually improving   Patient's Stated Pain Goal No pain   Hospital Pain Intervention(s) Repositioned; Ambulation/increased activity; Emotional support   Response to Interventions tolerated   Multiple Pain Sites Yes   Home Living   Type of [address] One level;Stairs to enter with rails   Bathroom Shower/Tub Tub/shower unit   [address] Other (Comment)  (no DME)   Additional Comments Pt reports living in 1 story home w/ 2 KAVITA   Prior Function   Level of Pr[place] Independent with ADLs and functional mobility   Lives With Significant other   Receives Help From Family   ADL Assistance Independent   IADLs Independent   Comments Pt reports being I w/ ADLS, IADLS, transfers and functional mobility PTA   Lifestyle   Autonomy Pt reports being I w/ ADLS, IADLS, transfers and functional mobility PTA   Reciprocal Relationships Pt lives w/ fiance and 15 y/o son   Service to Others Pt does not work   Intrinsic Gratification Pt did not report enjoyable activities PTA   Psychosocial   Psychosocial (WDL) X   Patient Behaviors/Mood Depressed;Flat affect   ADL   Eating Assistance 7  Independent   Grooming Assistance 7  Independent   UB Bathing Assistance 5  Supervision/Setup   LB Bathing Assistance 5  Supervision/Setup   UB Dressing Assistance 5  Supervision/Setup   LB Dressing Assistance 5  Supervision/Setup   Toileting Assistance  5  Supervision/Setup   Functional Assistance 4  Minimal Assistance   Bed Mobility   Rolling R 4  Minimal assistance   Additional items Assist x 1;Verbal cues   Supine to Sit 4  Minimal assistance   Additional items Assist x 1; Increased time required;Verbal cues; Bedrails   Sit to Supine 4  Minimal assistance   Additional items Assist x 1; Increased time required;Verbal cues; Bedrails   Additional Comments Pt rolled R to secure placement of tubes w/ Min A x1 for VC and mild force production to roll  Pt then went from supine<>sit w/ Min A x1 for VC for proper technique and to initiate movement  Pt also required VC for encouragement to participate 2* to pt reporting 10/10 pain   Transfers   Sit to Stand 4  Minimal assistance   Additional items Assist x 1; Increased time required;Verbal cues   Stand to Sit 4  Minimal assistance   Additional items Assist x 1; Increased time required;Verbal cues   Additional Comments Pt performed sit-stand from bed w/ Min A x1 for safety and balance and management of tubes      Functional Mobility   Functional Mobility 4  Minimal assistance   Additional Comments Pt took few small steps to [facility] w/ Min A x1 for safety and balance and hand held assist    Balance   Static Sitting Fair Dynamic Sitting Fair -   Static Standing Fair -   Ambulatory Fair -   Activity Tolerance   Activity Tolerance Patient limited by fatigue;Patient limited by pain   Medical Staff Made Aware PTMelchor   Nurse Made Aware yes, [place] Assessment   RUE Assessment WFL   LUE Assessment   LUE Assessment WFL   Hand Function   Gross Motor Coordination Functional   Fine Motor Coordination Functional   Cognition   Overall Cognitive Status WFL   Arousal/Participation Responsive; Cooperative   Attention Within functional limits   Orientation Level Oriented X4   Memory Within functional limits   Following Commands Follows one step commands without difficulty   Comments Pt is pleasant and cooperative; presents w/ increased lethargy and pain  Requires encouragement to participate and VC to initiate tasks as pt did not want to do anything 2* to pain  Assessment   Limitation Decreased ADL status; Decreased UE ROM; Decreased UE strength;Decreased Safe judgement during ADL;Decreased cognition;Decreased endurance;Decreased self-care trans;Decreased high-level ADLs   Prognosis Fair   Assessment Pt is a 35 y/o female seen for OT eval s/p adm to [facility] for further management of her complex surgical condition  Pt adm earlier this month after gastric perforation s/p gastric sleeve at an outside institution  She had multiple drains and exploratory laparotomy with multiple washouts with esophageal stenting and removal of a  shunt  A nasal jejunal tube was placed and pt was d/c to rehab 3/9  Pt is now readm due to tube feed coming from the midline wound  Pt is  dx'd w/ gastric perforation  Comorbidities include a h/o brain condition, migraine, obesity, lumboperitoneal shunt, rotator cuff tendinitis, and visual field deficit  Pt with active OT orders  Pt lives with fiance and 15 y/o son in 1 story home w/ 2 KAVITA  Pt was I w/  ADLS and IADLS, drove, & required no use of DME PTA   Pt is currently demonstrating the following occupational deficits: S UB and LB ADLS, Min A transfers and functional mobility  Pt with deficits and limitations in all baseline areas of occupation 2* pain, fatigue, decreased activity tolerance, decreased functional mobility, depression, decreased motivation, decreased strength and deconditioning, generalized weakness, and decreased I w/ ADLS and IADLS compared to previous level of functioning  The following Occupational Performance Areas to address include: grooming, bathing/shower, toilet hygiene, dressing, medication management, socialization, health maintenance, functional mobility, community mobility, clothing management, cleaning, meal prep, money management, household maintenance, care of children and social participation  Pt scored overall 55/100 on the Barthel Index  Based on the aforementioned OT evaluation, functional performance deficits, and assessments, pt has been identified as a moderate complexity evaluation  Recommend pt d/c to home w/ Home OT and increased family support when medically stable   Pt to continue to benefit from acute immediate OT services to address the following goals 3-5x/wk to  w/in 10-14 days:   Goals   Patient Goals to get pain meds   LTG Time Frame 7-10   Long Term Goal #1 see below listed goals   Plan   Treatment Interventions ADL retraining;Functional transfer training;UE strengthening/ROM; Endurance training;Cognitive reorientation;Patient/family training;Equipment evaluation/education; Compensatory technique education;Continued evaluation; Energy conservation; Activityengagement   Goal Expiration Date 18   OT Frequency 3-5x/wk   Recommendation   Recommendation Other (comment)  (Neuropsych- for psychotherapy & coping strategies)   OT Discharge Recommendation Home OT   OT - OK to Discharge Yes  (when medically stable)   Barthel Index   Feeding 0   Bathing 5   Grooming Score 5   Dressing Score 10   Bladder Score 10   Bowels Score 10   Toilet Use Score 5   Transfers (Bed/Chair) Score 10   Mobility (Level Surface) Score 0   Stairs Score 0   Barthel Index Score 55   Modified [place] Scale   Modified Sendy Scale 4        GOALS    1) Pt will improve activity tolerance to G for min 30 min txment sessions    2) Pt will complete UB/LB dressing/self care w/ mod I using adaptive device and DME as needed    3) Pt will complete bathing w/ Mod I w/ use of AE and DME as needed    4) Pt will complete toileting w/ mod I w/ G hygiene/thoroughness using DME as needed    5) Pt will improve functional transfers to Mod I on/off all surfaces using DME as needed w/ G balance/safety     6) Pt will improve functional mobility during ADL/IADL/leisure tasks to Mod I using DME as needed w/ G balance/safety     7) Pt will participate in simulated IADL management task to increase independence to Mod I w/ G safety and endurance    8) Pt will engage in ongoing cognitive assessment w/ G participation w/ mod I to assist w/ safe d/c planning/recommendations    9) Pt will demonstrate G carryover of pt/caregiver education and training as appropriate w/ mod I w/o cues w/ good tolerance    10) Pt will demonstrate 100% carryover of energy conservation techniques w/ mod I t/o functional I/ADL/leisure tasks w/o cues s/p skilled education       [facility] MOT, OTR/L

## 2018-03-29 LAB
GLUCOSE SERPL-MCNC: 105 MG/DL (ref 65–140)
GLUCOSE SERPL-MCNC: 127 MG/DL (ref 65–140)
GLUCOSE SERPL-MCNC: 142 MG/DL (ref 65–140)
GLUCOSE SERPL-MCNC: 155 MG/DL (ref 65–140)

## 2018-03-29 PROCEDURE — 99231 SBSQ HOSP IP/OBS SF/LOW 25: CPT | Performed by: THORACIC SURGERY (CARDIOTHORACIC VASCULAR SURGERY)

## 2018-03-29 PROCEDURE — 82948 REAGENT STRIP/BLOOD GLUCOSE: CPT

## 2018-03-29 PROCEDURE — 97530 THERAPEUTIC ACTIVITIES: CPT

## 2018-03-29 PROCEDURE — 97116 GAIT TRAINING THERAPY: CPT

## 2018-03-29 PROCEDURE — C9113 INJ PANTOPRAZOLE SODIUM, VIA: HCPCS | Performed by: SURGERY

## 2018-03-29 PROCEDURE — 99232 SBSQ HOSP IP/OBS MODERATE 35: CPT | Performed by: PHYSICIAN ASSISTANT

## 2018-03-29 RX ADMIN — HYDROMORPHONE HYDROCHLORIDE 1 MG: 1 INJECTION, SOLUTION INTRAMUSCULAR; INTRAVENOUS; SUBCUTANEOUS at 01:10

## 2018-03-29 RX ADMIN — LORAZEPAM 0.5 MG: 2 INJECTION INTRAMUSCULAR; INTRAVENOUS at 06:12

## 2018-03-29 RX ADMIN — INSULIN LISPRO 1 UNITS: 100 INJECTION, SOLUTION INTRAVENOUS; SUBCUTANEOUS at 12:45

## 2018-03-29 RX ADMIN — ONDANSETRON 4 MG: 2 INJECTION INTRAMUSCULAR; INTRAVENOUS at 10:16

## 2018-03-29 RX ADMIN — METOCLOPRAMIDE 10 MG: 5 INJECTION, SOLUTION INTRAMUSCULAR; INTRAVENOUS at 13:47

## 2018-03-29 RX ADMIN — HEPARIN SODIUM 5000 UNITS: 5000 INJECTION, SOLUTION INTRAVENOUS; SUBCUTANEOUS at 06:11

## 2018-03-29 RX ADMIN — HYDROMORPHONE HYDROCHLORIDE 1 MG: 1 INJECTION, SOLUTION INTRAMUSCULAR; INTRAVENOUS; SUBCUTANEOUS at 10:16

## 2018-03-29 RX ADMIN — HYDROMORPHONE HYDROCHLORIDE 1 MG: 1 INJECTION, SOLUTION INTRAMUSCULAR; INTRAVENOUS; SUBCUTANEOUS at 17:58

## 2018-03-29 RX ADMIN — HYDROMORPHONE HYDROCHLORIDE 1 MG: 1 INJECTION, SOLUTION INTRAMUSCULAR; INTRAVENOUS; SUBCUTANEOUS at 22:02

## 2018-03-29 RX ADMIN — HEPARIN SODIUM 5000 UNITS: 5000 INJECTION, SOLUTION INTRAVENOUS; SUBCUTANEOUS at 22:02

## 2018-03-29 RX ADMIN — METOCLOPRAMIDE 10 MG: 5 INJECTION, SOLUTION INTRAMUSCULAR; INTRAVENOUS at 22:08

## 2018-03-29 RX ADMIN — PANTOPRAZOLE SODIUM 40 MG: 40 INJECTION, POWDER, FOR SOLUTION INTRAVENOUS at 10:16

## 2018-03-29 RX ADMIN — ONDANSETRON 4 MG: 2 INJECTION INTRAMUSCULAR; INTRAVENOUS at 17:58

## 2018-03-29 RX ADMIN — HEPARIN SODIUM 5000 UNITS: 5000 INJECTION, SOLUTION INTRAVENOUS; SUBCUTANEOUS at 13:46

## 2018-03-29 RX ADMIN — LORAZEPAM 0.5 MG: 2 INJECTION INTRAMUSCULAR; INTRAVENOUS at 13:47

## 2018-03-29 RX ADMIN — LORAZEPAM 0.5 MG: 2 INJECTION INTRAMUSCULAR; INTRAVENOUS at 22:01

## 2018-03-29 RX ADMIN — CALCIUM GLUCONATE: 94 INJECTION, SOLUTION INTRAVENOUS at 22:02

## 2018-03-29 RX ADMIN — METOCLOPRAMIDE 10 MG: 5 INJECTION, SOLUTION INTRAMUSCULAR; INTRAVENOUS at 01:11

## 2018-03-29 RX ADMIN — HYDROMORPHONE HYDROCHLORIDE 1 MG: 1 INJECTION, SOLUTION INTRAMUSCULAR; INTRAVENOUS; SUBCUTANEOUS at 13:47

## 2018-03-29 NOTE — PLAN OF CARE
DISCHARGE PLANNING - CARE MANAGEMENT     Discharge to post-acute care or home with appropriate resources Progressing        GASTROINTESTINAL - ADULT     Minimal or absence of nausea and/or vomiting Progressing     Maintains or returns to baseline bowel function Progressing     Maintains adequate nutritional intake Progressing     Establish and maintain optimal ostomy function Progressing        INFECTION - ADULT     Absence or prevention of progression during hospitalization Progressing        Nutrition/Hydration-ADULT     Nutrient/Hydration intake appropriate for improving, restoring or maintaining nutritional needs Progressing        PAIN - ADULT     Verbalizes/displays adequate comfort level or baseline comfort level Progressing        Potential for Falls     Patient will remain free of falls Progressing        Prexisting or High Potential for Compromised Skin Integrity     Skin integrity is maintained or improved Progressing        SAFETY ADULT     Maintain or return to baseline ADL function Progressing     Maintain or return mobility status to optimal level Progressing     Patient will remain free of falls Progressing

## 2018-03-29 NOTE — PROGRESS NOTES
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 s/p unsuccessful endoscopic J tube placement and esophageal stent removal    Plan:  Plan for endoscopic repair today vs tomorrow  NPO  TPN  Continue wound manager and drains  Protonix  SQH    Subjective/Objective     Subjective: No acute events  Went for EGD and attempted J tube placement yesterday  Objective:    Blood pressure 114/66, pulse 92, temperature 99 6 °F (37 6 °C), temperature source Oral, resp  rate 18, height 5' 4" (1 626 m), weight 55 3 kg (122 lb), SpO2 96 %, not currently breastfeeding  ,Body mass index is 20 94 kg/m²  Intake/Output Summary (Last 24 hours) at 03/29/18 0634  Last data filed at 03/29/18 0059   Gross per 24 hour   Intake              300 ml   Output             1645 ml   Net            -1345 ml       Invasive Devices     Peripherally Inserted Central Catheter Line            PICC Line 39/60/65 Left Basilic 2 days          Drain            Closed/Suction Drain Left; Other (Comment) Abdomen Bulb -- days    Closed/Suction Drain Right Abdomen Other (Comment) -- days    Closed/Suction Drain Midline;Superior Abdomen Other (Comment) 10 Fr   40 days    NG/OG/Enteral Tube Nasogastric 8 Fr Left nares 33 days    Open Drain Midline Abdomen 24 days    Closed/Suction Drain Left;Lateral LUQ Bulb 12 Fr  22 days                Physical Exam:   General: NAD, AAOx3  CV: RRR +S1/S2  Chest: breath sounds bilaterally  Abdomen: soft, mildly tender, non-distended, WM in place, drains in place, mixed contents  Extremities: atraumatic, no edema        Results from last 7 days  Lab Units 03/28/18  0517 03/26/18  0442 03/24/18  0502   WBC Thousand/uL 8 41 7 98 8 53   HEMOGLOBIN g/dL 9 6* 9 0* 10 1*   HEMATOCRIT % 32 1* 29 7* 32 7*   PLATELETS Thousands/uL 217 229 211       Results from last 7 days  Lab Units 03/28/18  0517 03/27/18  1742 03/26/18  0442   SODIUM mmol/L 139 139 138   POTASSIUM mmol/L 4 0 3 8 3 7   CHLORIDE mmol/L 104 101 104   CO2 mmol/L 30 30 30   BUN mg/dL 21 20 21   CREATININE mg/dL 0 40* 0 41* 0 40*   GLUCOSE RANDOM mg/dL 119 70 122   CALCIUM mg/dL 9 1 9 7 10 0       Results from last 7 days  Lab Units 03/27/18  1034   INR  1 20*

## 2018-03-29 NOTE — PROGRESS NOTES
Progress Note - Margaux uSn 36 y o  female MRN: [id number]    Unit/Bed#: [facility] 427-01 Encounter: [id number]         Assessment/ Plan:  Gastric perforation w/ gastrocutaneous fistula    S/p EGD yesterday for PEJ placement  Unfortunately this could not be done, unable to transilluminate  Esophageal stents were removed  Perforation at GE junction, severe esophagitis, heaped up mucosa likely granulation tissue  Currently stable  Continue TPN  Case discussed with surgery she will likely need another procedure - suture vs revision  Subjective:   Pt seen & examined  No acute events  Vitals stable  Objective:     Vitals: Blood pressure 109/62, pulse 86, temperature 98 °F (36 7 °C), temperature source Oral, resp  rate 18, height 5' 4" (1 626 m), weight 55 3 kg (122 lb), SpO2 97 %, not currently breastfeeding  ,Body mass index is 20 94 kg/m²  Physical Exam: General appearance: alert and oriented, in no acute distress  Lungs: clear to auscultation bilaterally  Heart: regular rate and rhythm  Abdomen: +BS, soft, mildly TTP; gastrocutaneous fistula  Skin: Skin color, texture, turgor normal  No rashes or lesions     Invasive Devices     Peripherally Inserted Central Catheter Line            PICC Line 63/03/04 Left Basilic 3 days          Drain            Closed/Suction Drain Left; Other (Comment) Abdomen Bulb -- days    Closed/Suction Drain Right Abdomen Other (Comment) -- days    Closed/Suction Drain Midline;Superior Abdomen Other (Comment) 10 Fr   40 days    NG/OG/Enteral Tube Nasogastric 8 Fr Left nares 33 days    Open Drain Midline Abdomen 24 days    Closed/Suction Drain Left;Lateral LUQ Bulb 12 Fr  22 days

## 2018-03-29 NOTE — PHYSICAL THERAPY NOTE
Physical Therapy Progress Note     03/29/18 1104   Pain Assessment   Pain Assessment 0-10   Pain Score 7   Pain Type Acute pain   Pain Location Back; Chest   Pain Orientation Left   Restrictions/Precautions   Weight Bearing Precautions Per Order No   Other Precautions Cognitive; Fall Risk;Pain;Multiple lines;Telemetry   General   Chart Reviewed Yes   Cognition   Overall Cognitive Status WFL   Subjective   Subjective Agreeable to PT after encouragement  In bed semi reclined upon entry  Reported nausea and pain  Family present t/o session  Patient teary at times  Upset with partner  Bed Mobility   Supine to Sit 4  Minimal assistance   Additional items Assist x 1; Increased time required;Verbal cues   Sit to Supine 4  Minimal assistance   Additional items Assist x 1; Increased time required;Verbal cues   Transfers   Sit to Stand 4  Minimal assistance   Additional items Assist x 1; Increased time required;Verbal cues   Stand to Sit 4  Minimal assistance   Additional items Assist x 1; Increased time required   Stand pivot 5  Supervision   Additional items Assist x 1; Increased time required   Ambulation/Elevation   Gait pattern Short stride; Inconsistent bree; Excessively slow;R Knee Liol   Gait Assistance 4  Minimal assist   Additional items Assist x 1;Verbal cues   Assistive Device Rolling walker   Distance 200ft x 2   Balance   Static Sitting Fair +   Dynamic Sitting Fair   Static Standing Kenia [address]096 -  (with RW)   Activity Tolerance   Activity Tolerance Patient limited by fatigue   Nurse Made Aware Yes   Exercises   TKR Sitting;AROM; Bilateral;10 reps   Assessment   Prognosis Guarded   Problem List Decreased strength;Decreased range of motion;Decreased endurance; Impaired balance;Decreased mobility; Decreased cognition;Decreased coordination;Decreased safety awareness; Impaired judgement;Pain;Decreased skin integrity   Assessment Pt  seen for PT session  pt  needed encouragement to participate in session   Pt  able to perform all transfers with Min A/CGA  All tasks performed at a very slow pace  Ambulated 200ft x 2 with RW and Min A and A for lines  Pt  ambulated without AD for 4ft and noted LE instability and advised pt  use RW for ambulation  Patient also had Knee buckling while ambulating with RW and needed Min A to regain balance  The gait quality improved as the distance progressed  And pt  noted to be in a better mood as time progressed  Pt  able to perform seated LE TE's at EOB actively  Will continue to follow during the stay to improve functional mobility and independence  Goals   Patient Goals None reported   LTG Expiration Date 04/11/18   Treatment Day 1   Plan   Treatment/Interventions Functional transfer training;LE strengthening/ROM; Therapeutic exercise;Patient/family training;Equipment eval/education; Bed mobility;Gait training;Spoke to nursing;Family; Endurance training   PT Frequency 5x/wk   Recommendation   Recommendation Post acute IP rehab  (pending progress)   Equipment Recommended Other (Comment)  (TBD)         Ana Laura Sol, PTA

## 2018-03-29 NOTE — PLAN OF CARE
Problem: PHYSICAL THERAPY ADULT  Goal: Performs mobility at highest level of function for planned discharge setting  See evaluation for individualized goals  Treatment/Interventions: Functional transfer training, LE strengthening/ROM, Elevations, Therapeutic exercise, Endurance training, Bed mobility, Gait training, Equipment eval/education, Spoke to nursing, Spoke to case management, OT  Equipment Recommended:  (TBD)       See flowsheet documentation for full assessment, interventions and recommendations  Outcome: Progressing  Prognosis: Guarded  Problem List: Decreased strength, Decreased range of motion, Decreased endurance, Impaired balance, Decreased mobility, Decreased cognition, Decreased coordination, Decreased safety awareness, Impaired judgement, Pain, Decreased skin integrity  Assessment: Pt  seen for PT session  pt  needed encouragement to participate in session  Pt  able to perform all transfers with Min A/CGA  All tasks performed at a very slow pace  Ambulated 200ft x 2 with RW and Min A and A for lines  Pt  ambulated without AD for 4ft and noted LE instability and advised pt  use RW for ambulation  Patient also had Knee buckling while ambulating with RW and needed Min A to regain balance  The gait quality improved as the distance progressed  And pt  noted to be in a better mood as time progressed  Pt  able to perform seated LE TE's at EOB actively  Will continue to follow during the stay to improve functional mobility and independence  Barriers to Discharge: Inaccessible home environment     Recommendation: Post acute IP rehab (pending progress)          See flowsheet documentation for full assessment

## 2018-03-30 LAB
ANION GAP SERPL CALCULATED.3IONS-SCNC: 3 MMOL/L (ref 4–13)
BASOPHILS # BLD AUTO: 0.07 THOUSANDS/ΜL (ref 0–0.1)
BASOPHILS NFR BLD AUTO: 1 % (ref 0–1)
BUN SERPL-MCNC: 22 MG/DL (ref 5–25)
CALCIUM SERPL-MCNC: 9.4 MG/DL (ref 8.3–10.1)
CHLORIDE SERPL-SCNC: 102 MMOL/L (ref 100–108)
CO2 SERPL-SCNC: 32 MMOL/L (ref 21–32)
CREAT SERPL-MCNC: 0.31 MG/DL (ref 0.6–1.3)
EOSINOPHIL # BLD AUTO: 0.33 THOUSAND/ΜL (ref 0–0.61)
EOSINOPHIL NFR BLD AUTO: 4 % (ref 0–6)
ERYTHROCYTE [DISTWIDTH] IN BLOOD BY AUTOMATED COUNT: 16.8 % (ref 11.6–15.1)
GFR SERPL CREATININE-BSD FRML MDRD: 139 ML/MIN/1.73SQ M
GLUCOSE SERPL-MCNC: 124 MG/DL (ref 65–140)
GLUCOSE SERPL-MCNC: 125 MG/DL (ref 65–140)
GLUCOSE SERPL-MCNC: 143 MG/DL (ref 65–140)
GLUCOSE SERPL-MCNC: 157 MG/DL (ref 65–140)
GLUCOSE SERPL-MCNC: 99 MG/DL (ref 65–140)
HCT VFR BLD AUTO: 31 % (ref 34.8–46.1)
HGB BLD-MCNC: 9.5 G/DL (ref 11.5–15.4)
LYMPHOCYTES # BLD AUTO: 1.72 THOUSANDS/ΜL (ref 0.6–4.47)
LYMPHOCYTES NFR BLD AUTO: 22 % (ref 14–44)
MAGNESIUM SERPL-MCNC: 2 MG/DL (ref 1.6–2.6)
MCH RBC QN AUTO: 27.7 PG (ref 26.8–34.3)
MCHC RBC AUTO-ENTMCNC: 30.6 G/DL (ref 31.4–37.4)
MCV RBC AUTO: 90 FL (ref 82–98)
MONOCYTES # BLD AUTO: 0.57 THOUSAND/ΜL (ref 0.17–1.22)
MONOCYTES NFR BLD AUTO: 7 % (ref 4–12)
NEUTROPHILS # BLD AUTO: 5.29 THOUSANDS/ΜL (ref 1.85–7.62)
NEUTS SEG NFR BLD AUTO: 66 % (ref 43–75)
NRBC BLD AUTO-RTO: 1 /100 WBCS
PHOSPHATE SERPL-MCNC: 3.2 MG/DL (ref 2.7–4.5)
PLATELET # BLD AUTO: 244 THOUSANDS/UL (ref 149–390)
PMV BLD AUTO: 10.2 FL (ref 8.9–12.7)
POTASSIUM SERPL-SCNC: 3.9 MMOL/L (ref 3.5–5.3)
RBC # BLD AUTO: 3.43 MILLION/UL (ref 3.81–5.12)
SODIUM SERPL-SCNC: 137 MMOL/L (ref 136–145)
WBC # BLD AUTO: 8.01 THOUSAND/UL (ref 4.31–10.16)

## 2018-03-30 PROCEDURE — 99231 SBSQ HOSP IP/OBS SF/LOW 25: CPT | Performed by: THORACIC SURGERY (CARDIOTHORACIC VASCULAR SURGERY)

## 2018-03-30 PROCEDURE — C9113 INJ PANTOPRAZOLE SODIUM, VIA: HCPCS | Performed by: SURGERY

## 2018-03-30 PROCEDURE — 83735 ASSAY OF MAGNESIUM: CPT | Performed by: PHYSICIAN ASSISTANT

## 2018-03-30 PROCEDURE — 82948 REAGENT STRIP/BLOOD GLUCOSE: CPT

## 2018-03-30 PROCEDURE — 97116 GAIT TRAINING THERAPY: CPT

## 2018-03-30 PROCEDURE — 85025 COMPLETE CBC W/AUTO DIFF WBC: CPT | Performed by: STUDENT IN AN ORGANIZED HEALTH CARE EDUCATION/TRAINING PROGRAM

## 2018-03-30 PROCEDURE — 84100 ASSAY OF PHOSPHORUS: CPT | Performed by: PHYSICIAN ASSISTANT

## 2018-03-30 PROCEDURE — G0515 COGNITIVE SKILLS DEVELOPMENT: HCPCS | Performed by: STUDENT IN AN ORGANIZED HEALTH CARE EDUCATION/TRAINING PROGRAM

## 2018-03-30 PROCEDURE — 80048 BASIC METABOLIC PNL TOTAL CA: CPT | Performed by: PHYSICIAN ASSISTANT

## 2018-03-30 PROCEDURE — 97530 THERAPEUTIC ACTIVITIES: CPT

## 2018-03-30 RX ADMIN — HEPARIN SODIUM 5000 UNITS: 5000 INJECTION, SOLUTION INTRAVENOUS; SUBCUTANEOUS at 05:27

## 2018-03-30 RX ADMIN — HEPARIN SODIUM 5000 UNITS: 5000 INJECTION, SOLUTION INTRAVENOUS; SUBCUTANEOUS at 13:38

## 2018-03-30 RX ADMIN — INSULIN LISPRO 1 UNITS: 100 INJECTION, SOLUTION INTRAVENOUS; SUBCUTANEOUS at 11:56

## 2018-03-30 RX ADMIN — PANTOPRAZOLE SODIUM 40 MG: 40 INJECTION, POWDER, FOR SOLUTION INTRAVENOUS at 09:21

## 2018-03-30 RX ADMIN — ONDANSETRON 4 MG: 2 INJECTION INTRAMUSCULAR; INTRAVENOUS at 02:32

## 2018-03-30 RX ADMIN — HYDROMORPHONE HYDROCHLORIDE 1 MG: 1 INJECTION, SOLUTION INTRAMUSCULAR; INTRAVENOUS; SUBCUTANEOUS at 09:21

## 2018-03-30 RX ADMIN — HYDROMORPHONE HYDROCHLORIDE 1 MG: 1 INJECTION, SOLUTION INTRAMUSCULAR; INTRAVENOUS; SUBCUTANEOUS at 21:37

## 2018-03-30 RX ADMIN — HYDROMORPHONE HYDROCHLORIDE 1 MG: 1 INJECTION, SOLUTION INTRAMUSCULAR; INTRAVENOUS; SUBCUTANEOUS at 02:32

## 2018-03-30 RX ADMIN — METOCLOPRAMIDE 10 MG: 5 INJECTION, SOLUTION INTRAMUSCULAR; INTRAVENOUS at 21:39

## 2018-03-30 RX ADMIN — ONDANSETRON 4 MG: 2 INJECTION INTRAMUSCULAR; INTRAVENOUS at 17:37

## 2018-03-30 RX ADMIN — CALCIUM GLUCONATE: 94 INJECTION, SOLUTION INTRAVENOUS at 21:31

## 2018-03-30 RX ADMIN — METOCLOPRAMIDE 10 MG: 5 INJECTION, SOLUTION INTRAMUSCULAR; INTRAVENOUS at 13:38

## 2018-03-30 RX ADMIN — LORAZEPAM 0.5 MG: 2 INJECTION INTRAMUSCULAR; INTRAVENOUS at 05:32

## 2018-03-30 RX ADMIN — ONDANSETRON 4 MG: 2 INJECTION INTRAMUSCULAR; INTRAVENOUS at 09:21

## 2018-03-30 RX ADMIN — HYDROMORPHONE HYDROCHLORIDE 1 MG: 1 INJECTION, SOLUTION INTRAMUSCULAR; INTRAVENOUS; SUBCUTANEOUS at 17:36

## 2018-03-30 RX ADMIN — METOCLOPRAMIDE 10 MG: 5 INJECTION, SOLUTION INTRAMUSCULAR; INTRAVENOUS at 05:27

## 2018-03-30 RX ADMIN — LORAZEPAM 0.5 MG: 2 INJECTION INTRAMUSCULAR; INTRAVENOUS at 21:38

## 2018-03-30 RX ADMIN — HYDROMORPHONE HYDROCHLORIDE 1 MG: 1 INJECTION, SOLUTION INTRAMUSCULAR; INTRAVENOUS; SUBCUTANEOUS at 13:38

## 2018-03-30 RX ADMIN — HYDROMORPHONE HYDROCHLORIDE 1 MG: 1 INJECTION, SOLUTION INTRAMUSCULAR; INTRAVENOUS; SUBCUTANEOUS at 05:27

## 2018-03-30 RX ADMIN — HEPARIN SODIUM 5000 UNITS: 5000 INJECTION, SOLUTION INTRAVENOUS; SUBCUTANEOUS at 21:31

## 2018-03-30 RX ADMIN — ALTEPLASE 2 MG: 2.2 INJECTION, POWDER, LYOPHILIZED, FOR SOLUTION INTRAVENOUS at 09:27

## 2018-03-30 NOTE — PLAN OF CARE
Problem: OCCUPATIONAL THERAPY ADULT  Goal: Performs self-care activities at highest level of function for planned discharge setting  See evaluation for individualized goals  Treatment Interventions: ADL retraining, Functional transfer training, UE strengthening/ROM, Endurance training, Cognitive reorientation, Patient/family training, Equipment evaluation/education, Compensatory technique education, Continued evaluation, Energy conservation, Activityengagement          See flowsheet documentation for full assessment, interventions and recommendations  Outcome: Progressing  Limitation: Decreased ADL status, Decreased UE ROM, Decreased UE strength, Decreased Safe judgement during ADL, Decreased cognition, Decreased endurance, Decreased self-care trans, Decreased high-level ADLs  Prognosis: Fair  Assessment: Pt participates in OT session with focus on cognitive reorientation to increase I for d/c  Pt scored 26/30 on MOCA, indicating within normal range for cognition  Please see below for details  Pt very fatigued and refused other activities  Pt willing to complete cognitive assessment on 2nd attempt  Pt will continue to benefit from activity tolerance and adls    Recommendation: Other (comment) (Neuropsych- for psychotherapy & coping strategies)  OT Discharge Recommendation: Home OT  OT - OK to Discharge: Yes (when medically stable)

## 2018-03-30 NOTE — PLAN OF CARE
Problem: PHYSICAL THERAPY ADULT  Goal: Performs mobility at highest level of function for planned discharge setting  See evaluation for individualized goals  Treatment/Interventions: Functional transfer training, LE strengthening/ROM, Elevations, Therapeutic exercise, Endurance training, Bed mobility, Gait training, Equipment eval/education, Spoke to nursing, Spoke to case management, OT  Equipment Recommended:  (TBD)       See flowsheet documentation for full assessment, interventions and recommendations  Outcome: Progressing  Prognosis: Fair  Problem List: Decreased strength, Decreased endurance, Impaired balance, Decreased mobility, Decreased safety awareness  Assessment: Pt demonstrated improvement this session vs  previous session  Able to amb without AD with supervision for increased distance  No evidence of R LE buckling  Pt encouraged with family to amb 4x/day if not more to improve overall activity tolerance and functional mobility  Family in agreement with recommendation  Barriers to Discharge: Inaccessible home environment     Recommendation: Home PT          See flowsheet documentation for full assessment

## 2018-03-30 NOTE — OCCUPATIONAL THERAPY NOTE
03/30/18 1613   Restrictions/Precautions   Weight Bearing Precautions Per Order No   Other Precautions Cognitive; Fall Risk;Telemetry   Pain Assessment   Pain Assessment No/denies pain   Pain Score No Pain   ADL   Where Assessed Supine, bed  (HOB elevated)   Cognition   Overall Cognitive Status [facility]   Arousal/Participation Alert; Responsive   Attention Attends with cues to redirect   Orientation Level Oriented X4   Memory Within functional limits   Following Commands Follows one step commands without difficulty   Cognition Assessment Tools MOCA   Score 26   Activity Tolerance   Activity Tolerance Patient limited by fatigue   Assessment   Assessment Pt participates in OT session with focus on cognitive reorientation to increase I for d/c  Pt scored 26/30 on MOCA, indicating within normal range for cognition  Please see below for details  Pt very fatigued and refused other activities  Pt willing to complete cognitive assessment on 2nd attempt  Pt will continue to benefit from activity tolerance and adls  Plan   Treatment Interventions Cognitive reorientation   Goal Expiration Date 04/11/18   Treatment Day 1   OT Frequency 3-5x/wk   Recommendation   OT Discharge Recommendation Home OT     PT SEEN FOR SANDY COGNITIVE ASSESSMENT  SCORED  26/30 INDICATING NO COGNITIVE IMPAIRMENT FOR AGE/EDUCATION  SCORES ARE AS FOLLOWS:    VISUOSPATIAL/EXECUTIVE FUNCTION: 5/5    NAMING: 3/3    ATTENTION: 5/6  Pt made error with serial subtractions and lost 1 point  LANGUAGE: 3/3    ABSTRACTION: 2/2    DELAYED RECALL: 3/5  Pt did not recall face or red  ORIENTATION: 5/6  Pt did not know the date

## 2018-03-30 NOTE — PROGRESS NOTES
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, s/p EGD stent removal    Plan:  Plan for possible endoscopic repair vs surgical revision  NPO/TPN  Wound manager output significantly decreased, CT scan if fever/leukocytosis, increased pain  Protonix  SQH    Subjective/Objective     Subjective: No acute events  Objective:    Blood pressure 146/63, pulse 79, temperature 98 1 °F (36 7 °C), temperature source Oral, resp  rate 18, height 5' 4" (1 626 m), weight 55 3 kg (122 lb), SpO2 97 %, not currently breastfeeding  ,Body mass index is 20 94 kg/m²  Intake/Output Summary (Last 24 hours) at 03/30/18 2793  Last data filed at 03/30/18 0459   Gross per 24 hour   Intake          1347 99 ml   Output             2260 ml   Net          -912 01 ml       Invasive Devices     Peripherally Inserted Central Catheter Line            PICC Line 03/94/70 Left Basilic 3 days          Drain            Closed/Suction Drain Left; Other (Comment) Abdomen Bulb -- days    Closed/Suction Drain Right Abdomen Other (Comment) -- days    Closed/Suction Drain Midline;Superior Abdomen Other (Comment) 10 Fr  41 days    NG/OG/Enteral Tube Nasogastric 8 Fr Left nares 34 days    Open Drain Midline Abdomen 25 days    Closed/Suction Drain Left;Lateral LUQ Bulb 12 Fr   23 days                Physical Exam:   General: NAD, AAOx3  CV: RRR +S1/S2  Chest: breath sounds bilaterally  Abdomen: soft, NT ND, wound manager in place minimal output  Extremities: atraumatic, no edema        Results from last 7 days  Lab Units 03/28/18  0517 03/26/18  0442 03/24/18  0502   WBC Thousand/uL 8 41 7 98 8 53   HEMOGLOBIN g/dL 9 6* 9 0* 10 1*   HEMATOCRIT % 32 1* 29 7* 32 7*   PLATELETS Thousands/uL 217 229 211       Results from last 7 days  Lab Units 03/30/18  0504 03/28/18  0517 03/27/18  1742   SODIUM mmol/L 137 139 139 POTASSIUM mmol/L 3 9 4 0 3 8   CHLORIDE mmol/L 102 104 101   CO2 mmol/L 32 30 30   BUN mg/dL 22 21 20   CREATININE mg/dL 0 31* 0 40* 0 41*   GLUCOSE RANDOM mg/dL 99 119 70   CALCIUM mg/dL 9 4 9 1 9 7       Results from last 7 days  Lab Units 03/27/18  1034   INR  1 20*

## 2018-03-30 NOTE — PHYSICAL THERAPY NOTE
Physical Therapy Progress Note    Rose Justin, PT      03/30/18 1010   Pain Assessment   Pain Assessment No/denies pain   Restrictions/Precautions   Weight Bearing Precautions Per Order No   Other Precautions Cognitive;Multiple lines;Telemetry; Fall Risk   General   Chart Reviewed Yes   Additional Pertinent History cleared for PT as per RN   Response to Previous Treatment Patient with no complaints from previous session  Cognition   Overall Cognitive Status WFL   Arousal/Participation Responsive; Cooperative   Attention Attends with cues to redirect   Orientation Level Disoriented X4   Memory Within functional limits   Following Commands Follows one step commands without difficulty   Comments Pt required increased encouragement to paritcipate in session from therapist as well as father and SO   Subjective   Subjective Pt recevied supine in bed, agreeable post encouragement  No complaints other than fatigue  Bed Mobility   Rolling L 6  Modified independent   Transfers   Sit to Stand 5  Supervision   Stand to Sit 5  Supervision   Stand pivot 5  Supervision   Ambulation/Elevation   Gait pattern Shuffling; Inconsistent bree; Short stride; Excessively slow   Gait Assistance 5  Supervision   Assistive Device None   Distance 325ft x2   Balance   Ambulatory Fair -   Endurance Deficit   Endurance Deficit Yes   Endurance Deficit Description decreased activity tolerance, fatigue    Activity Tolerance   Activity Tolerance Patient limited by fatigue   Nurse Made Aware yes   Assessment   Prognosis Fair   Problem List Decreased strength;Decreased endurance; Impaired balance;Decreased mobility; Decreased safety awareness   Assessment Pt demonstrated improvement this session vs  previous session  Able to amb without AD with supervision for increased distance  No evidence of R LE buckling  Pt encouraged with family to amb 4x/day if not more to improve overall activity tolerance and functional mobility   Family in agreement with recommendation  Goals   Patient Goals To be able to eat   LTG Expiration Date 04/11/18   Treatment Day 2   Plan   Treatment/Interventions Functional transfer training;LE strengthening/ROM; Elevations; Therapeutic exercise; Endurance training;Patient/family training;Gait training;Spoke to case management;Spoke to nursing   PT Frequency 5x/wk   Recommendation   Recommendation Home PT

## 2018-03-31 LAB
ANION GAP SERPL CALCULATED.3IONS-SCNC: 6 MMOL/L (ref 4–13)
BASOPHILS # BLD AUTO: 0.06 THOUSANDS/ΜL (ref 0–0.1)
BASOPHILS NFR BLD AUTO: 1 % (ref 0–1)
BUN SERPL-MCNC: 20 MG/DL (ref 5–25)
CALCIUM SERPL-MCNC: 8.9 MG/DL (ref 8.3–10.1)
CHLORIDE SERPL-SCNC: 104 MMOL/L (ref 100–108)
CO2 SERPL-SCNC: 28 MMOL/L (ref 21–32)
CREAT SERPL-MCNC: 0.32 MG/DL (ref 0.6–1.3)
EOSINOPHIL # BLD AUTO: 0.56 THOUSAND/ΜL (ref 0–0.61)
EOSINOPHIL NFR BLD AUTO: 8 % (ref 0–6)
ERYTHROCYTE [DISTWIDTH] IN BLOOD BY AUTOMATED COUNT: 16.4 % (ref 11.6–15.1)
GFR SERPL CREATININE-BSD FRML MDRD: 138 ML/MIN/1.73SQ M
GLUCOSE SERPL-MCNC: 109 MG/DL (ref 65–140)
GLUCOSE SERPL-MCNC: 114 MG/DL (ref 65–140)
GLUCOSE SERPL-MCNC: 121 MG/DL (ref 65–140)
GLUCOSE SERPL-MCNC: 125 MG/DL (ref 65–140)
HCT VFR BLD AUTO: 30.9 % (ref 34.8–46.1)
HGB BLD-MCNC: 9.4 G/DL (ref 11.5–15.4)
LYMPHOCYTES # BLD AUTO: 1.63 THOUSANDS/ΜL (ref 0.6–4.47)
LYMPHOCYTES NFR BLD AUTO: 23 % (ref 14–44)
MAGNESIUM SERPL-MCNC: 1.9 MG/DL (ref 1.6–2.6)
MCH RBC QN AUTO: 26.9 PG (ref 26.8–34.3)
MCHC RBC AUTO-ENTMCNC: 30.4 G/DL (ref 31.4–37.4)
MCV RBC AUTO: 89 FL (ref 82–98)
MONOCYTES # BLD AUTO: 0.61 THOUSAND/ΜL (ref 0.17–1.22)
MONOCYTES NFR BLD AUTO: 9 % (ref 4–12)
NEUTROPHILS # BLD AUTO: 4.2 THOUSANDS/ΜL (ref 1.85–7.62)
NEUTS SEG NFR BLD AUTO: 59 % (ref 43–75)
NRBC BLD AUTO-RTO: 0 /100 WBCS
PHOSPHATE SERPL-MCNC: 3.4 MG/DL (ref 2.7–4.5)
PLATELET # BLD AUTO: 248 THOUSANDS/UL (ref 149–390)
PMV BLD AUTO: 9.7 FL (ref 8.9–12.7)
POTASSIUM SERPL-SCNC: 4 MMOL/L (ref 3.5–5.3)
RBC # BLD AUTO: 3.49 MILLION/UL (ref 3.81–5.12)
SODIUM SERPL-SCNC: 138 MMOL/L (ref 136–145)
WBC # BLD AUTO: 7.1 THOUSAND/UL (ref 4.31–10.16)

## 2018-03-31 PROCEDURE — 97530 THERAPEUTIC ACTIVITIES: CPT

## 2018-03-31 PROCEDURE — 84100 ASSAY OF PHOSPHORUS: CPT | Performed by: STUDENT IN AN ORGANIZED HEALTH CARE EDUCATION/TRAINING PROGRAM

## 2018-03-31 PROCEDURE — 83735 ASSAY OF MAGNESIUM: CPT | Performed by: STUDENT IN AN ORGANIZED HEALTH CARE EDUCATION/TRAINING PROGRAM

## 2018-03-31 PROCEDURE — 99231 SBSQ HOSP IP/OBS SF/LOW 25: CPT | Performed by: THORACIC SURGERY (CARDIOTHORACIC VASCULAR SURGERY)

## 2018-03-31 PROCEDURE — 80048 BASIC METABOLIC PNL TOTAL CA: CPT | Performed by: STUDENT IN AN ORGANIZED HEALTH CARE EDUCATION/TRAINING PROGRAM

## 2018-03-31 PROCEDURE — C9113 INJ PANTOPRAZOLE SODIUM, VIA: HCPCS | Performed by: SURGERY

## 2018-03-31 PROCEDURE — 82948 REAGENT STRIP/BLOOD GLUCOSE: CPT

## 2018-03-31 PROCEDURE — 85025 COMPLETE CBC W/AUTO DIFF WBC: CPT | Performed by: STUDENT IN AN ORGANIZED HEALTH CARE EDUCATION/TRAINING PROGRAM

## 2018-03-31 RX ADMIN — ONDANSETRON 4 MG: 2 INJECTION INTRAMUSCULAR; INTRAVENOUS at 11:00

## 2018-03-31 RX ADMIN — ONDANSETRON 4 MG: 2 INJECTION INTRAMUSCULAR; INTRAVENOUS at 18:47

## 2018-03-31 RX ADMIN — HYDROMORPHONE HYDROCHLORIDE 0.5 MG: 1 INJECTION, SOLUTION INTRAMUSCULAR; INTRAVENOUS; SUBCUTANEOUS at 14:18

## 2018-03-31 RX ADMIN — HYDROMORPHONE HYDROCHLORIDE 1 MG: 1 INJECTION, SOLUTION INTRAMUSCULAR; INTRAVENOUS; SUBCUTANEOUS at 10:56

## 2018-03-31 RX ADMIN — HYDROMORPHONE HYDROCHLORIDE 0.5 MG: 1 INJECTION, SOLUTION INTRAMUSCULAR; INTRAVENOUS; SUBCUTANEOUS at 18:47

## 2018-03-31 RX ADMIN — HEPARIN SODIUM 5000 UNITS: 5000 INJECTION, SOLUTION INTRAVENOUS; SUBCUTANEOUS at 05:56

## 2018-03-31 RX ADMIN — HEPARIN SODIUM 5000 UNITS: 5000 INJECTION, SOLUTION INTRAVENOUS; SUBCUTANEOUS at 21:36

## 2018-03-31 RX ADMIN — HEPARIN SODIUM 5000 UNITS: 5000 INJECTION, SOLUTION INTRAVENOUS; SUBCUTANEOUS at 14:18

## 2018-03-31 RX ADMIN — ONDANSETRON 4 MG: 2 INJECTION INTRAMUSCULAR; INTRAVENOUS at 02:38

## 2018-03-31 RX ADMIN — PANTOPRAZOLE SODIUM 40 MG: 40 INJECTION, POWDER, FOR SOLUTION INTRAVENOUS at 08:34

## 2018-03-31 RX ADMIN — METOCLOPRAMIDE 10 MG: 5 INJECTION, SOLUTION INTRAMUSCULAR; INTRAVENOUS at 05:44

## 2018-03-31 RX ADMIN — CALCIUM GLUCONATE: 94 INJECTION, SOLUTION INTRAVENOUS at 21:36

## 2018-03-31 RX ADMIN — HYDROMORPHONE HYDROCHLORIDE 0.5 MG: 1 INJECTION, SOLUTION INTRAMUSCULAR; INTRAVENOUS; SUBCUTANEOUS at 02:38

## 2018-03-31 RX ADMIN — LORAZEPAM 0.5 MG: 2 INJECTION INTRAMUSCULAR; INTRAVENOUS at 21:48

## 2018-03-31 RX ADMIN — HYDROMORPHONE HYDROCHLORIDE 0.5 MG: 1 INJECTION, SOLUTION INTRAMUSCULAR; INTRAVENOUS; SUBCUTANEOUS at 05:57

## 2018-03-31 NOTE — PROGRESS NOTES
Progress Note - Thoracic Surgery   Andrea Terry 36 y o  female MRN: [id number]  Unit/Bed#: [facility] 427-01 Encounter: [id number]    Assessment:  35 yo F with proximal gastric perforation following hiatal hernia repair, s/p multiple surgical interventions including esophageal stent with removal    Plan:  - maintain drains  - midline wound manager  - continue NPO/TPN  - may attempt EGD with perforation repair next week  - analgesia  - DVT ppx -- SQH    Subjective/Objective     Subjective: Patient denies complaints  Objective:     Vitals: Blood pressure 107/60, pulse 76, temperature 98 3 °F (36 8 °C), temperature source Oral, resp  rate 18, height 5' 4" (1 626 m), weight 55 3 kg (122 lb), SpO2 98 %, not currently breastfeeding  ,Body mass index is 20 94 kg/m²  I/O       03/29 0701 - 03/30 0700 03/30 0701 - 03/31 0700 03/31 0701 - 04/01 0700    P  O  0 0     I V  (mL/kg)       TPN 1348 2820 7     Total Intake(mL/kg) 1348 (24 4) 2820 7 (51)     Urine (mL/kg/hr) 2150 (1 6) 1100 (0 8)     Drains 185 (0 1) 283 (0 2)     Stool  0 (0)     Total Output 2335 1383      Net -987 +1437 7             Unmeasured Stool Occurrence  450 x           Physical Exam:  GEN: NAD  HEENT: MMM  CV: RRR  Lung: Normal effort  Ab: Soft, NT/ND, oily brown drainage from JPs  Extrem: No CCE  Neuro:  A+Ox3    Lab, Imaging and other studies:   CBC with diff:   Lab Results   Component Value Date    WBC 7 10 03/31/2018    HGB 9 4 (L) 03/31/2018    HCT 30 9 (L) 03/31/2018    MCV 89 03/31/2018     03/31/2018    MCH 26 9 03/31/2018    MCHC 30 4 (L) 03/31/2018    RDW 16 4 (H) 03/31/2018    MPV 9 7 03/31/2018    NRBC 0 03/31/2018   , BMP/CMP:   Lab Results   Component Value Date     03/31/2018    K 4 0 03/31/2018     03/31/2018    CO2 28 03/31/2018    ANIONGAP 6 03/31/2018    BUN 20 03/31/2018    CREATININE 0 32 (L) 03/31/2018    GLUCOSE 109 03/31/2018    CALCIUM 8 9 03/31/2018    EGFR 138 03/31/2018   , Magnesium: No results found for: MAG  VTE Pharmacologic Prophylaxis: Heparin  VTE Mechanical Prophylaxis: sequential compression device

## 2018-03-31 NOTE — RESTORATIVE TECHNICIAN NOTE
Restorative Specialist Mobility Note       Activity: Ambulate in fuchs, Arabi privileges, Dangle     Assistive Device: None

## 2018-03-31 NOTE — PLAN OF CARE
Problem: OCCUPATIONAL THERAPY ADULT  Goal: Performs self-care activities at highest level of function for planned discharge setting  See evaluation for individualized goals  Treatment Interventions: ADL retraining, Functional transfer training, UE strengthening/ROM, Endurance training, Cognitive reorientation, Patient/family training, Equipment evaluation/education, Compensatory technique education, Continued evaluation, Energy conservation, Activityengagement          See flowsheet documentation for full assessment, interventions and recommendations  Outcome: Not Progressing  Limitation: Decreased ADL status, Decreased UE ROM, Decreased UE strength, Decreased Safe judgement during ADL, Decreased cognition, Decreased endurance, Decreased self-care trans, Decreased high-level ADLs  Prognosis: Fair  Assessment: Pt seen this OT session primarily with anticiated focus on functional transfers/mobility, activity tolerance/fucntional endurance  Pt refused all out of bed activities despite maximal education/cueing/motivation from family/OT  OT educated patient/family on rehabilitation process, importance of out of bed tolerance/activity, prevention of hospital associated acquired complications, and altnative therapeutic activities to trial to improve her out of bed tolerance  Family receptive/acknowledged OT education  Pt receptive but continues to refuse in mobilty related tasks      Recommendation: Other (comment) (Neuropsych- for psychotherapy & coping strategies)  OT Discharge Recommendation: Home OT  OT - OK to Discharge: Yes (when medically stable)      Comments: Morgan Nunes MS,OTR/L

## 2018-03-31 NOTE — PHYSICAL THERAPY NOTE
Physical Therapy Cancellation Note:    Pt reports working with "someone for over an hour" to wash her hair and to "get ready"  Pt declines PT services at this time   Cont to follow as able to perform therapy interventions, cancel PT for today

## 2018-03-31 NOTE — OCCUPATIONAL THERAPY NOTE
OccupationalTherapy Progress Note     Patient Name: Shefali Foss  FWGCQ'X Date: 3/31/2018  Problem List  Patient Active Problem List   Diagnosis    Pseudotumor cerebri    S/P  shunt    Occipital headache    Brain condition    Gastric leak    Acute peritonitis    Idiopathic intracranial hypertension    S/P  shunt    Infection of  (ventriculoperitoneal) shunt, subsequent encounter    Murmur, cardiac    Refusal of blood product    Gastroesophageal reflux disease    Choroidal nevus, right eye    Moderate protein-calorie malnutrition (HCC)    Gastric perforation (HCC)    Postoperative intra-abdominal abscess (HCC)    Abdominal wound dehiscence    Neutrophilic leukocytosis    Left-sided chest wall pain           03/31/18 1430   Restrictions/Precautions   Weight Bearing Precautions Per Order No   Other Precautions Cognitive; Fall Risk;Pain;Multiple lines   General   Family/Caregiver Present Patients daughter and mother were present during OT session  MOther providing appropriate level of encouragement/coaxign for patients participation in therapy  Pt became tearful multiple times t/o session when family encouraging her to get out of bed and reported they would leave if she wouldnt  Pt reported "I just want you to stay till i fall asleep from these pain meds and then sneak out"  Daughter/mother left and patients father was then present  Additionally, he provided adequate level of motivation but patient continued to refuse out of bed activity  Pain Assessment   Pain Score 8   Pain Type Acute pain   Pain Location Shoulder; Chest   Pain Orientation Left   Pain Frequency Constant/continuous   Hospital Pain Intervention(s) Distraction   ADL   Eating Assistance 5  Supervision/Setup   Eating Deficit Setup   Eating Comments Pt unable to reach for drink on end table to the L- assistance from OT provided   Bed Mobility   Rolling R 5  Supervision   Additional items HOB elevated; Bedrails   Rolling L 5  Supervision   Additional items Increased time required;Verbal cues; Bedrails   Cognition   Overall Cognitive Status WFL   Arousal/Participation Uncooperative  (tearful)   Attention Attends with cues to redirect   Orientation Level Oriented X4   Comments Pt needed constant cues to attend to requested task of using incentive spirometer  Activity Tolerance   Activity Tolerance Patient limited by pain; Patient limited by fatigue   Medical Staff Made Aware yes   Assessment   Assessment Pt seen this OT session primarily with anticiated focus on functional transfers/mobility, activity tolerance/fucntional endurance  Pt refused all out of bed activities despite maximal education/cueing/motivation from family/OT  OT educated patient/family on rehabilitation process, importance of out of bed tolerance/activity, prevention of hospital  acquired complications (bed sores, pneumonia, muscle atrophy/wasting, etc , and altnative therapeutic activities to trial to improve her out of bed tolerance  Family receptive/acknowledged OT education  Pt receptive but continues to refuse in mobilty related tasks  Pt did agree to perform five shoulder flex/ext AROM exercises but then refused anything further  Pt also willing to perform incentive spirometer exercises 10x but with maximal coaxing as she attempted to place back down on tray table 3 separate times prior to performing 10  Pt's mood highly fluctuated within the 15 minute session from smiling and cheerful, crying/sad, to agitated/irritable  Pt presents with difficulty coping with her stressors- OT providing validation of feelings as appropriate       Plan   Treatment Interventions Patient/family training   Goal Expiration Date 04/11/18   Treatment Day 2   OT Frequency 3-5x/wk

## 2018-04-01 LAB
GLUCOSE SERPL-MCNC: 114 MG/DL (ref 65–140)
GLUCOSE SERPL-MCNC: 118 MG/DL (ref 65–140)
GLUCOSE SERPL-MCNC: 120 MG/DL (ref 65–140)
MAGNESIUM SERPL-MCNC: 2 MG/DL (ref 1.6–2.6)

## 2018-04-01 PROCEDURE — 99231 SBSQ HOSP IP/OBS SF/LOW 25: CPT | Performed by: THORACIC SURGERY (CARDIOTHORACIC VASCULAR SURGERY)

## 2018-04-01 PROCEDURE — 82948 REAGENT STRIP/BLOOD GLUCOSE: CPT

## 2018-04-01 PROCEDURE — 83735 ASSAY OF MAGNESIUM: CPT | Performed by: SURGERY

## 2018-04-01 PROCEDURE — 97530 THERAPEUTIC ACTIVITIES: CPT

## 2018-04-01 PROCEDURE — 97535 SELF CARE MNGMENT TRAINING: CPT

## 2018-04-01 PROCEDURE — C9113 INJ PANTOPRAZOLE SODIUM, VIA: HCPCS | Performed by: SURGERY

## 2018-04-01 RX ADMIN — ONDANSETRON 4 MG: 2 INJECTION INTRAMUSCULAR; INTRAVENOUS at 21:26

## 2018-04-01 RX ADMIN — PANTOPRAZOLE SODIUM 40 MG: 40 INJECTION, POWDER, FOR SOLUTION INTRAVENOUS at 08:21

## 2018-04-01 RX ADMIN — CALCIUM GLUCONATE: 94 INJECTION, SOLUTION INTRAVENOUS at 22:36

## 2018-04-01 RX ADMIN — HEPARIN SODIUM 5000 UNITS: 5000 INJECTION, SOLUTION INTRAVENOUS; SUBCUTANEOUS at 14:41

## 2018-04-01 RX ADMIN — HYDROMORPHONE HYDROCHLORIDE 1 MG: 1 INJECTION, SOLUTION INTRAMUSCULAR; INTRAVENOUS; SUBCUTANEOUS at 06:27

## 2018-04-01 RX ADMIN — ONDANSETRON 4 MG: 2 INJECTION INTRAMUSCULAR; INTRAVENOUS at 06:28

## 2018-04-01 RX ADMIN — HEPARIN SODIUM 5000 UNITS: 5000 INJECTION, SOLUTION INTRAVENOUS; SUBCUTANEOUS at 21:26

## 2018-04-01 RX ADMIN — HYDROMORPHONE HYDROCHLORIDE 0.5 MG: 1 INJECTION, SOLUTION INTRAMUSCULAR; INTRAVENOUS; SUBCUTANEOUS at 22:35

## 2018-04-01 RX ADMIN — LORAZEPAM 0.5 MG: 2 INJECTION INTRAMUSCULAR; INTRAVENOUS at 22:36

## 2018-04-01 RX ADMIN — HEPARIN SODIUM 5000 UNITS: 5000 INJECTION, SOLUTION INTRAVENOUS; SUBCUTANEOUS at 06:27

## 2018-04-01 RX ADMIN — ONDANSETRON 4 MG: 2 INJECTION INTRAMUSCULAR; INTRAVENOUS at 12:20

## 2018-04-01 RX ADMIN — HYDROMORPHONE HYDROCHLORIDE 0.5 MG: 1 INJECTION, SOLUTION INTRAMUSCULAR; INTRAVENOUS; SUBCUTANEOUS at 14:41

## 2018-04-01 RX ADMIN — HYDROMORPHONE HYDROCHLORIDE 0.5 MG: 1 INJECTION, SOLUTION INTRAMUSCULAR; INTRAVENOUS; SUBCUTANEOUS at 18:08

## 2018-04-01 NOTE — PROGRESS NOTES
Progress Note - Thoracic Surgery   Alo Cordoba 36 y o  female MRN: [id number]  Unit/Bed#: [facility] 427-01 Encounter: [id number]    Assessment:  35 yo F with proximal gastric perforation following hiatal hernia repair, s/p multiple surgical interventions including esophageal stent with removal    Plan:  - continue ADENIKE x2 to sxn  - midline wound manager -- minimal output  - continue NPO/TPN  - f/u AM labs tomorrow  - poss EGD with perforation repair this week  - analgesia  - DVT ppx -- SQH    Subjective/Objective     Subjective: Patient denies complaints  Drain outputs decreasing, no drainage from midline  Objective:     Vitals: Blood pressure 107/67, pulse 75, temperature 98 °F (36 7 °C), temperature source Oral, resp  rate 18, height 5' 4" (1 626 m), weight 55 3 kg (122 lb), SpO2 96 %, not currently breastfeeding  ,Body mass index is 20 94 kg/m²  I/O       03/30 0701 - 03/31 0700 03/31 0701 - 04/01 0700 04/01 0701 - 04/02 0700    P  O  0      NG/GT  0     TPN 2820 7 1623 2     Total Intake(mL/kg) 2820 7 (51) 1623 2 (29 4)     Urine (mL/kg/hr) 1100 (0 8) 2500 (1 9)     Drains 283 (0 2) 120 (0 1) 20 (0 3)    Stool 0 (0)      Total Output 1383 2620 20    Net +1437 7 -996 8 -20           Unmeasured Stool Occurrence 450 x            Physical Exam:  GEN: NAD  HEENT: MMM  CV: RRR  Lung: Normal effort  Ab: Soft, NT/ND, ADENIKE oily brown drainage, midline wound manager clean  Extrem: No CCE  Neuro:  A+Ox3    Lab, Imaging and other studies: CBC with diff: No results found for: WBC, HGB, HCT, MCV, PLT, ADJUSTEDWBC, MCH, MCHC, RDW, MPV, NRBC, BMP/CMP: No results found for: NA, K, CL, CO2, ANIONGAP, BUN, CREATININE, GLUCOSE, CALCIUM, AST, ALT, ALKPHOS, PROT, ALBUMIN, BILITOT, EGFR, Magnesium: No results found for: MAG  VTE Pharmacologic Prophylaxis: Heparin  VTE Mechanical Prophylaxis: sequential compression device

## 2018-04-01 NOTE — OCCUPATIONAL THERAPY NOTE
Occupational Therapy Treatment Note:         04/01/18 1240   Restrictions/Precautions   Weight Bearing Precautions Per Order No   Other Precautions Cognitive; Fall Risk;Pain;Multiple lines   Pain Assessment   Pain Assessment 0-10   Pain Score No Pain   Pain Type Acute pain   Pain Location Abdomen;Back   Pain Orientation Left   ADL   Where Assessed Edge of bed   Grooming Assistance 5  Supervision/Setup   Grooming Deficit Setup   LB Dressing Assistance 5  Supervision/Setup   LB Dressing Deficit Setup;Steadying; Increased time to complete; Don/doff R sock; Don/doff L sock; Don/doff L shoe;Don/doff R shoe   LB Dressing Comments Pt able to bring BLE into lap to don/doff shoes and socks  Functional Standing Tolerance   Time 3 mins  Activity functional mobility  Comments Pt with increased nausea  Returned to seated positioning  Bed Mobility   Supine to Sit 5  Supervision   Additional items Assist x 1   Sit to Supine 5  Supervision   Additional items Assist x 1   Additional Comments Slow movement noted  Transfers   Sit to Stand 5  Supervision   Additional items Assist x 1   Stand to Sit 5  Supervision   Additional items Assist x 1   Stand pivot 5  Supervision   Additional items Assist x 1   Additional Comments Pt without LOB noted this tx session  Functional Mobility   Functional Mobility 5  Supervision   Additional Comments x1   Cognition   Overall Cognitive Status WFL   Arousal/Participation Alert   Attention Attends with cues to redirect   Orientation Level Oriented X4   Memory Within functional limits   Following Commands Follows multistep commands with increased time or repetition   Comments Pt with decreased mood and fatigue noted  Teary through out tx session      Additional Activities   Additional Activities (Reviewed energy conservation techs)   Additional Activities Comments Encouraged Pt to complete a daily schedule for activities as tolerated to promote initiate and consistency with progression of her personal goals  Activity Tolerance   Activity Tolerance Patient limited by fatigue;Patient limited by pain   Medical Staff Made Aware Reported all findings to nursing staff MANDIE Cai  Assessment   Assessment Pt was seen for skilled OT with focus on completion of bed mobility, functional transfers and review of energy conservation techs and review of current plan of care  Pt with limited engagement noted due to fatigue and nausea  See above levels of A required for all functional tasks  Pt was able to display F functional reach while seated EOB without further increase in pain or fatigue levels  Extended time provided for Pt to self express  Pt reports feeling, "terrible", that her 15year old son was the one her found her  Encouraged Pt to identify current gains regarding her functional abilities and health  Pt teary through out tx session  Moderate reassurance and emotional support was required  Pt with limited stand tolerance due to noted nausea  SBA overall for all functional tasks  Will continue to follow as tolerated  Pt will benefit from continued family support and home therapies upon discharge      Plan   Treatment Interventions Patient/family training   Goal Expiration Date 04/11/18   Treatment Day 3   OT Frequency 3-5x/wk   Recommendation   OT Discharge Recommendation Home OT   Barthel Index   Feeding 0   Bathing 5   Grooming Score 5   Dressing Score 10   Bladder Score 10   Bowels Score 10   Toilet Use Score 5   Transfers (Bed/Chair) Score 10   Mobility (Level Surface) Score 0   Stairs Score 0   Barthel Index Score 55   Azalea Elise, [address]

## 2018-04-01 NOTE — PLAN OF CARE
Problem: OCCUPATIONAL THERAPY ADULT  Goal: Performs self-care activities at highest level of function for planned discharge setting  See evaluation for individualized goals  Treatment Interventions: ADL retraining, Functional transfer training, UE strengthening/ROM, Endurance training, Cognitive reorientation, Patient/family training, Equipment evaluation/education, Compensatory technique education, Continued evaluation, Energy conservation, Activityengagement          See flowsheet documentation for full assessment, interventions and recommendations  Outcome: Progressing  Limitation: Decreased ADL status, Decreased UE ROM, Decreased UE strength, Decreased Safe judgement during ADL, Decreased cognition, Decreased endurance, Decreased self-care trans, Decreased high-level ADLs  Prognosis: Fair  Assessment: Pt was seen for skilled OT with focus on completion of bed mobility, functional transfers and review of energy conservation techs and review of current plan of care  Pt with limited engagement noted due to fatigue and nausea  See above levels of A required for all functional tasks  Pt was able to display F functional reach while seated EOB without further increase in pain or fatigue levels  Extended time provided for Pt to self express  Pt reports feeling, "terrible", that her 15year old son was the one her found her  Encouraged Pt to identify current gains regarding her functional abilities and health  Pt teary through out tx session  Moderate reassurance and emotional support was required  Pt with limited stand tolerance due to noted nausea  SBA overall for all functional tasks  Will continue to follow as tolerated  Pt will benefit from continued family support and home therapies upon discharge     Recommendation: Other (comment) (Neuropsych- for psychotherapy & coping strategies)  OT Discharge Recommendation: Home OT  OT - OK to Discharge: Yes (when medically stable)      Comments: Tia Acosta VALENTINO

## 2018-04-02 LAB
ANION GAP SERPL CALCULATED.3IONS-SCNC: 5 MMOL/L (ref 4–13)
BASOPHILS # BLD AUTO: 0.06 THOUSANDS/ΜL (ref 0–0.1)
BASOPHILS NFR BLD AUTO: 1 % (ref 0–1)
BUN SERPL-MCNC: 19 MG/DL (ref 5–25)
CALCIUM SERPL-MCNC: 9.3 MG/DL (ref 8.3–10.1)
CHLORIDE SERPL-SCNC: 104 MMOL/L (ref 100–108)
CO2 SERPL-SCNC: 30 MMOL/L (ref 21–32)
CREAT SERPL-MCNC: 0.29 MG/DL (ref 0.6–1.3)
EOSINOPHIL # BLD AUTO: 0.43 THOUSAND/ΜL (ref 0–0.61)
EOSINOPHIL NFR BLD AUTO: 7 % (ref 0–6)
ERYTHROCYTE [DISTWIDTH] IN BLOOD BY AUTOMATED COUNT: 16.2 % (ref 11.6–15.1)
GFR SERPL CREATININE-BSD FRML MDRD: 142 ML/MIN/1.73SQ M
GLUCOSE SERPL-MCNC: 119 MG/DL (ref 65–140)
GLUCOSE SERPL-MCNC: 125 MG/DL (ref 65–140)
GLUCOSE SERPL-MCNC: 138 MG/DL (ref 65–140)
GLUCOSE SERPL-MCNC: 156 MG/DL (ref 65–140)
GLUCOSE SERPL-MCNC: 85 MG/DL (ref 65–140)
HCT VFR BLD AUTO: 29.4 % (ref 34.8–46.1)
HGB BLD-MCNC: 9.2 G/DL (ref 11.5–15.4)
LYMPHOCYTES # BLD AUTO: 1.72 THOUSANDS/ΜL (ref 0.6–4.47)
LYMPHOCYTES NFR BLD AUTO: 26 % (ref 14–44)
MCH RBC QN AUTO: 27.1 PG (ref 26.8–34.3)
MCHC RBC AUTO-ENTMCNC: 31.3 G/DL (ref 31.4–37.4)
MCV RBC AUTO: 87 FL (ref 82–98)
MONOCYTES # BLD AUTO: 0.53 THOUSAND/ΜL (ref 0.17–1.22)
MONOCYTES NFR BLD AUTO: 8 % (ref 4–12)
NEUTROPHILS # BLD AUTO: 3.9 THOUSANDS/ΜL (ref 1.85–7.62)
NEUTS SEG NFR BLD AUTO: 58 % (ref 43–75)
NRBC BLD AUTO-RTO: 0 /100 WBCS
PHOSPHATE SERPL-MCNC: 3.7 MG/DL (ref 2.7–4.5)
PLATELET # BLD AUTO: 249 THOUSANDS/UL (ref 149–390)
PMV BLD AUTO: 10.3 FL (ref 8.9–12.7)
POTASSIUM SERPL-SCNC: 3.7 MMOL/L (ref 3.5–5.3)
PREALB SERPL-MCNC: 14.6 MG/DL (ref 18–40)
RBC # BLD AUTO: 3.39 MILLION/UL (ref 3.81–5.12)
SODIUM SERPL-SCNC: 139 MMOL/L (ref 136–145)
WBC # BLD AUTO: 6.66 THOUSAND/UL (ref 4.31–10.16)

## 2018-04-02 PROCEDURE — 82948 REAGENT STRIP/BLOOD GLUCOSE: CPT

## 2018-04-02 PROCEDURE — 99231 SBSQ HOSP IP/OBS SF/LOW 25: CPT | Performed by: THORACIC SURGERY (CARDIOTHORACIC VASCULAR SURGERY)

## 2018-04-02 PROCEDURE — 85025 COMPLETE CBC W/AUTO DIFF WBC: CPT | Performed by: SURGERY

## 2018-04-02 PROCEDURE — 97535 SELF CARE MNGMENT TRAINING: CPT

## 2018-04-02 PROCEDURE — 84134 ASSAY OF PREALBUMIN: CPT | Performed by: SURGERY

## 2018-04-02 PROCEDURE — C9113 INJ PANTOPRAZOLE SODIUM, VIA: HCPCS | Performed by: SURGERY

## 2018-04-02 PROCEDURE — 84100 ASSAY OF PHOSPHORUS: CPT | Performed by: STUDENT IN AN ORGANIZED HEALTH CARE EDUCATION/TRAINING PROGRAM

## 2018-04-02 PROCEDURE — 80048 BASIC METABOLIC PNL TOTAL CA: CPT | Performed by: SURGERY

## 2018-04-02 RX ADMIN — METOCLOPRAMIDE 10 MG: 5 INJECTION, SOLUTION INTRAMUSCULAR; INTRAVENOUS at 10:02

## 2018-04-02 RX ADMIN — INSULIN LISPRO 1 UNITS: 100 INJECTION, SOLUTION INTRAVENOUS; SUBCUTANEOUS at 00:27

## 2018-04-02 RX ADMIN — ONDANSETRON 4 MG: 2 INJECTION INTRAMUSCULAR; INTRAVENOUS at 22:34

## 2018-04-02 RX ADMIN — ONDANSETRON 4 MG: 2 INJECTION INTRAMUSCULAR; INTRAVENOUS at 14:13

## 2018-04-02 RX ADMIN — METOCLOPRAMIDE 10 MG: 5 INJECTION, SOLUTION INTRAMUSCULAR; INTRAVENOUS at 16:40

## 2018-04-02 RX ADMIN — PANTOPRAZOLE SODIUM 40 MG: 40 INJECTION, POWDER, FOR SOLUTION INTRAVENOUS at 10:02

## 2018-04-02 RX ADMIN — HEPARIN SODIUM 5000 UNITS: 5000 INJECTION, SOLUTION INTRAVENOUS; SUBCUTANEOUS at 14:13

## 2018-04-02 RX ADMIN — HEPARIN SODIUM 5000 UNITS: 5000 INJECTION, SOLUTION INTRAVENOUS; SUBCUTANEOUS at 21:21

## 2018-04-02 RX ADMIN — ONDANSETRON 4 MG: 2 INJECTION INTRAMUSCULAR; INTRAVENOUS at 05:12

## 2018-04-02 RX ADMIN — LORAZEPAM 0.5 MG: 2 INJECTION INTRAMUSCULAR; INTRAVENOUS at 22:34

## 2018-04-02 RX ADMIN — HYDROMORPHONE HYDROCHLORIDE 1 MG: 1 INJECTION, SOLUTION INTRAMUSCULAR; INTRAVENOUS; SUBCUTANEOUS at 22:34

## 2018-04-02 RX ADMIN — HYDROMORPHONE HYDROCHLORIDE 0.5 MG: 1 INJECTION, SOLUTION INTRAMUSCULAR; INTRAVENOUS; SUBCUTANEOUS at 10:01

## 2018-04-02 RX ADMIN — HEPARIN SODIUM 5000 UNITS: 5000 INJECTION, SOLUTION INTRAVENOUS; SUBCUTANEOUS at 05:07

## 2018-04-02 RX ADMIN — HYDROMORPHONE HYDROCHLORIDE 0.5 MG: 1 INJECTION, SOLUTION INTRAMUSCULAR; INTRAVENOUS; SUBCUTANEOUS at 14:12

## 2018-04-02 RX ADMIN — HYDROMORPHONE HYDROCHLORIDE 0.5 MG: 1 INJECTION, SOLUTION INTRAMUSCULAR; INTRAVENOUS; SUBCUTANEOUS at 05:12

## 2018-04-02 RX ADMIN — HYDROMORPHONE HYDROCHLORIDE 0.5 MG: 1 INJECTION, SOLUTION INTRAMUSCULAR; INTRAVENOUS; SUBCUTANEOUS at 18:24

## 2018-04-02 NOTE — CASE MANAGEMENT
Thank you,  [facility] in the [facility] by Donald Villa for 2017  Network Utilization Review Department  Phone: [phone]; Fax [phone]  ATTENTION: The Network Utilization Review Department is now centralized for our 7 Facilities  Make a note that we have a new phone and fax numbers for our Department  Please call with any questions or concerns to [phone] and carefully follow the prompts so that you are directed to the right person  All voicemails are confidential  Fax any determinations, approvals, denials, and requests for initial or continue stay review clinical to [phone]   Due to HIGH CALL volume, it would be easier if you could please send faxed requests to expedite your requests and in part, help us provide discharge notifications faster     ===============================================================    Continued Stay Review    Date: 04/01/2018    Vital Signs: /72 (BP Location: Right arm)   Pulse 70   Temp 98 2 °F (36 8 °C) (Oral)   Resp 20   Ht 5' 4" (1 626 m)   Wt 55 7 kg (122 lb 12 7 oz)   LMP  (LMP Unknown)   SpO2 98%   BMI 21 08 kg/m²     Medications:   Scheduled Meds:   Current Facility-Administered Medications:  Adult TPN (CUSTOM BASE/STANDARD ELECTROLYTE)  Intravenous Continuous Last Rate: 67 4 mL/hr at 04/02/18 0600   Adult TPN (CUSTOM BASE/STANDARD ELECTROLYTE)  Intravenous Continuous    heparin (porcine) 5,000 Units Subcutaneous Q8H [facility]    HYDROmorphone 0 5 mg Intravenous Q3H PRN    HYDROmorphone 1 mg Intravenous Q3H PRN    insulin lispro 1-5 Units Subcutaneous Q6H MYRNA    LORazepam 0 5 mg Intravenous Q6H PRN    metoclopramide 10 mg Intravenous Q6H PRN    ondansetron 4 mg Intravenous Q6H PRN    pantoprazole 40 mg Intravenous Q24H [facility]      Continuous Infusions:   Adult TPN (CUSTOM BASE/STANDARD ELECTROLYTE)  Last Rate: 67 4 mL/hr at 04/02/18 0600   Adult TPN (CUSTOM BASE/STANDARD ELECTROLYTE)         Abnormal Labs/Diagnostic Results:     Age/Sex: 36 y o  female     Assessment/Plan:   Assessment:  35 yo F with proximal gastric perforation following hiatal hernia repair, s/p multiple surgical interventions including esophageal stent with removal     Plan:  - continue ADENIKE x2 to sxn  - midline wound manager -- minimal output  - continue NPO/TPN  - f/u AM labs tomorrow  - poss EGD with perforation repair this week  - analgesia  - DVT ppx -- [facility]     Discharge Plan: TBD

## 2018-04-02 NOTE — OCCUPATIONAL THERAPY NOTE
Occupational Therapy Treatment Note      Roger Zuniga    4/2/2018    Patient Active Problem List   Diagnosis    Pseudotumor cerebri    S/P  shunt    Occipital headache    Brain condition    Gastric leak    Acute peritonitis    Idiopathic intracranial hypertension    S/P  shunt    Infection of  (ventriculoperitoneal) shunt, subsequent encounter    Murmur, cardiac    Refusal of blood product    Gastroesophageal reflux disease    Choroidal nevus, right eye    Moderate protein-calorie malnutrition (HCC)    Gastric perforation (HCC)    Postoperative intra-abdominal abscess (HCC)    Abdominal wound dehiscence    Neutrophilic leukocytosis    Left-sided chest wall pain       Past Medical History:   Diagnosis Date    Brain condition     Pseudotumor Cerebri     Migraine     Obesity     Papilledema, both eyes     Presence of lumboperitoneal shunt     Resolved: Sep 20, 2017    Rotator cuff tendinitis     Resolved: Aug 23, 2017    Visual field defect        Past Surgical History:   Procedure Laterality Date    CSF SHUNT      LP shunt - 2015 -  shunt - 2017    ESOPHAGOGASTRODUODENOSCOPY N/A 2/23/2018    Procedure: ESOPHAGOGASTRODUODENOSCOPY (EGD) WITH ESOPHAGEAL STENT PLACEMENT;  Surgeon: Vinh Davis MD;  [facility];  Service: Thoracic    ESOPHAGOGASTRODUODENOSCOPY N/A 2/23/2018    Procedure: ESOPHAGOGASTRODUODENOSCOPY (EGD) WITH REMOVAL ESOPHAGEAL STENT  AND REPLACEMENT WITH 23mm X155mm [address];  Surgeon: Vinh Davis MD;  [facility];  Service: Thoracic    ESOPHAGOGASTRODUODENOSCOPY N/A 3/28/2018    Procedure: ESOPHAGOGASTRODUODENOSCOPY (EGD) with PEJ placement ;  Surgeon: Vandana Soto MD;  Location: [facility]; Service: Gastroenterology    ESOPHAGOSCOPY WITH STENT INSERTION N/A 1/24/2018    Procedure: INSERTION STENT ESOPHAGEAL;  Surgeon: Elzbieta Rayo MD;  [facility];   Service: Gastroenterology    EYE SURGERY      for "crossed eyed" (in 2nd grade)     GASTRIC BYPASS  2016    HERNIA REPAIR      HYSTERECTOMY      LAPAROTOMY N/A 1/25/2018    Procedure: Exploratory Laparotomy, wash out,placement of drains, placement of NG feeding tube ; Surgeon: Ana Peters DO;  [facility];  Service: General    CA CREATE SHUNT:VENTRIC-PERITONEAL Right 5/31/2017    Procedure: IMAGE GUIDED CORONAL PLACEMENT OF PROGRAMABLE VENTRICULAR-PERITONEAL SHUNT, REMOVAL OF LP SHUNT ;  Surgeon: Karuna Chamberlain MD;  [facility];  Service: Neurosurgery    CA April Em CSF SHUNT,W/O REPLACE Right 2/5/2018    Procedure: Removal of  shunt;  Surgeon: Karuna Chamberlain MD;  [facility];  Service: Neurosurgery    CA REPLACEMENT/REVISION,CSF SHUNT Right 1/25/2018    Procedure: Externalization of right-sided SHUNT VENTRICULAR-PERITONEAL in anterior chest wall ribs two and three level  ;  Surgeon: Adelia Vinson MD;  [facility];  Service: Neurosurgery      04/02/18 1610   Restrictions/Precautions   Weight Bearing Precautions Per Order No   Other Precautions Multiple lines; Fall Risk;Pain   Lifestyle   Autonomy Pt reports being I w/ ADLS, IADLS, transfers and functional mobility PTA   Reciprocal Relationships Pt lives w/ fiance and 15 y/o son   Service to Others Pt does not work   Intrinsic Gratification Pt did not report enjoyable activities PTA   Pain Assessment   Pain Assessment FLACC   Pete-Townsend FACES Pain Rating 0   Pain Type Acute pain   Pain Location Abdomen   Pain Orientation Mid   Pain Descriptors Aching;Jabbing   Pain Frequency Intermittent   Patient's Stated Pain Goal No pain   Hospital Pain Intervention(s) Repositioned; Emotional support   Response to Interventions improved   Pain Rating: FLACC (Rest) - Face 1   Pain Rating: FLACC (Rest) - Legs 0   Pain Rating: FLACC (Rest) - Activity 0   Pain Rating: FLACC (Rest) - Cry 0   Pain Rating: FLACC (Rest) - Consolability 1   Score: FLACC (Rest) 2   Pain Rating: FLACC (Activity) - Face 1   Pain Rating: FLACC (Activity) - Legs 0   Pain Rating: FLACC (Activity) - Activity 0   Pain Rating: FLACC (Activity) - Cry 1   Pain Rating: FLACC (Activity) - Consolability 1   Score: FLACC (Activity) 3   ADL   Grooming Assistance 5  Supervision/Setup   Grooming Deficit Supervision/safety; Wash/dry hands   Grooming Comments pt completed grooming while standing at sink  Pt able to wash/dry hands at sink w/ S for safety, no AD   LB Dressing Assistance 5  Supervision/Setup   LB Dressing Deficit Setup;Supervision/safety; Thread RLE into pants; Thread LLE into pants;Pull up over hips;Don/doff R shoe;Don/doff L shoe   LB Dressing Comments Pt completed LB dressing while seated EOB  W/ setup pt able to don slippers and don/doff hospital pants  S for safety in standing, no AD used   Toileting Assistance  7  Independent   Toileting Deficit Perineal hygiene   Toileting Comments Pt completed toileting on commode, able to manage perineal hygiene   Bed Mobility   Supine to Sit 5  Supervision   Additional items Increased time required;Verbal cues;HOB elevated   Sit to Supine 5  Supervision   Additional items Increased time required;Verbal cues;HOB elevated   Additional Comments Pt went from supine<>sit w/ S for safety, HOB elevated    Transfers   Sit to Stand 5  Supervision   Additional items Increased time required;Verbal cues   Stand to Sit 5  Supervision   Additional items Increased time required;Verbal cues   Toilet transfer 5  Supervision   Additional items Increased time required;Verbal cues; Commode   Additional Comments pt completed sit-stand from EOB and 1 commode transfer w/ S for safety no AD in standing   Functional Mobility   Functional Mobility 5  Supervision   Additional Comments Pt ambulated to and from bathroom w/ S for safety, no AD   Toilet Transfers   Toilet Transfer From [employer] Transfer Type To and from   Toilet Transfer to Standard bedside commode   Toilet Transfer Technique Ambulating   Toilet Transfers Supervision   Therapeutic Excerise-Strength   UE Strength Yes   Right Upper Extremity- Strength   R Shoulder Flexion   R Elbow Elbow flexion;Elbow extension   R Position Seated   Equipment Other (Comment)  (2 lb cleansing bottle)   R Weight/Reps/Sets Pt completed 2 sets of 10 bicep curls and chest press to increase UE strength for functional tasks   Left Upper Extremity-Strength   L Shoulder Flexion   L Elbow Elbow flexion;Elbow extension   L Position Seated   Equipment Other (Comment)  (2 lb cleansing bottle)   L Weights/Reps/Sets Pt completed 2 sets of 10 bicep curls and chest press to increased UE strength for functional tasks   Cognition   Overall Cognitive Status WFL   Arousal/Participation Alert; Responsive; Cooperative   Attention Within functional limits   Orientation Level Oriented X4   Memory Within functional limits   Following Commands Follows one step commands without difficulty   Comments Pt is pleasant and cooperative   Activity Tolerance   Activity Tolerance Patient limited by fatigue;Patient limited by pain   Medical Staff Made Aware Yves LOCKWOOD   Assessment   Assessment Patient participated in Skilled OT session 4/2/18  with interventions consisting of ADL re training with the use of correct body mechnaics, Energy Conservation techniques, safety awareness and fall prevention techniques,  therapeutic activities to: increase activity tolerance, increase dynamic sit/ stand balance during functional activity , increase postural control and increase trunk control , EOB sitting tolerance, and UE strengthening  Patient agreeable to OT treatment session, upon arrival patient was found supine in bed  In comparison to previous session, patient with improvements in bed mobility, transfers, LB dressing, and activity tolerance   Patient requiring ocassional safety reminders   Patient continues to be functioning below baseline level, occupational performance remains limited secondary to factors listed above and increased risk for falls and injury  From OT standpoint, recommendation at time of d/c would be Home w/ home OT when medically stable   Patient to benefit from continued Occupational Therapy treatment while in the hospital to address deficits as defined above and maximize level of functional independence with ADLs and functional mobility  Plan   Treatment Interventions ADL retraining;Functional transfer training;UE strengthening/ROM; Endurance training;Patient/family training;Cognitive reorientation;Equipment evaluation/education; Compensatory technique education;Continued evaluation; Energy conservation; Activityengagement   Goal Expiration Date 04/11/18   Treatment Day 4   OT Frequency 3-5x/wk   Recommendation   OT Discharge Recommendation Home OT   OT - OK to Discharge Yes  (when medically stable)   Barthel Index   Feeding 0   Bathing 5   Grooming Score 5   Dressing Score 10   Bladder Score 10   Bowels Score 10   Toilet Use Score 10   Transfers (Bed/Chair) Score 15   Mobility (Level Surface) Score 0   Stairs Score 0   Barthel Index Score 65   Modified [place] Scale   Modified [place]y Scale 3       Diann Gonzalez MOT, OTR/L

## 2018-04-02 NOTE — PLAN OF CARE
Problem: OCCUPATIONAL THERAPY ADULT  Goal: Performs self-care activities at highest level of function for planned discharge setting  See evaluation for individualized goals  Treatment Interventions: ADL retraining, Functional transfer training, UE strengthening/ROM, Endurance training, Cognitive reorientation, Patient/family training, Equipment evaluation/education, Compensatory technique education, Continued evaluation, Energy conservation, Activityengagement          See flowsheet documentation for full assessment, interventions and recommendations  Outcome: Progressing  Limitation: Decreased ADL status, Decreased UE ROM, Decreased UE strength, Decreased Safe judgement during ADL, Decreased cognition, Decreased endurance, Decreased self-care trans, Decreased high-level ADLs  Prognosis: Fair  Assessment: Patient participated in Skilled OT session 4/2/18  with interventions consisting of ADL re training with the use of correct body mechnaics, Energy Conservation techniques, safety awareness and fall prevention techniques,  therapeutic activities to: increase activity tolerance, increase dynamic sit/ stand balance during functional activity , increase postural control and increase trunk control , EOB sitting tolerance, and UE strengthening  Patient agreeable to OT treatment session, upon arrival patient was found supine in bed  In comparison to previous session, patient with improvements in bed mobility, transfers, LB dressing, and activity tolerance   Patient requiring ocassional safety reminders  Patient continues to be functioning below baseline level, occupational performance remains limited secondary to factors listed above and increased risk for falls and injury     From OT standpoint, recommendation at time of d/c would be Home w/ home OT when medically stable   Patient to benefit from continued Occupational Therapy treatment while in the hospital to address deficits as defined above and maximize level of functional independence with ADLs and functional mobility     Recommendation: Other (comment) (Neuropsych- for psychotherapy & coping strategies)     OT Discharge Recommendation: Home OT  OT - OK to Discharge: Yes (when medically stable)      Comments: Diann Gonzalez MOT, OTR/L

## 2018-04-02 NOTE — PROGRESS NOTES
Progress Note - Thoracic Surgery   Gwenejose Fofana 36 y o  female MRN: [id number]  Unit/Bed#: [facility] 427-01 Encounter: [id number]    Assessment:  37 yo F with proximal gastric perforation following hiatal hernia repair, s/p multiple surgical interventions including esophageal stent with removal    Plan:  - consult bariatrics for EGD, poss endoscopic repair of perforation  - continue ADENIKE drains x2  - NPO/NGT  - TPN  - f/u AM labs  - OOB, ambulate  - DVT ppx    Subjective/Objective     Subjective: Patient feels well  No complaints this AM       Objective:     Vitals: Blood pressure 118/66, pulse 68, temperature 97 6 °F (36 4 °C), temperature source Tympanic, resp  rate 16, height 5' 4" (1 626 m), weight 55 7 kg (122 lb 12 7 oz), SpO2 96 %, not currently breastfeeding  ,Body mass index is 21 08 kg/m²  I/O       03/31 0701 - 04/01 0700 04/01 0701 - 04/02 0700    P  O   0    NG/GT 0 0    TPN 1623 2     Total Intake(mL/kg) 1623 2 (29 4) 0 (0)    Urine (mL/kg/hr) 2500 (1 9) 2050 (1 5)    Drains 120 (0 1) 300 (0 2)    Total Output 2620 2350    Net -996 8 -2350                Physical Exam:  GEN: NAD  HEENT: MMM  CV: RRR  Lung: Normal effort  Ab: Soft, NT/ND, ADENIKE x2 drainage oily brown, no drainage from midline wound manager  Extrem: No CCE  Neuro:  A+Ox3    Lab, Imaging and other studies: CBC with diff: No results found for: WBC, HGB, HCT, MCV, PLT, ADJUSTEDWBC, MCH, MCHC, RDW, MPV, NRBC, BMP/CMP: No results found for: NA, K, CL, CO2, ANIONGAP, BUN, CREATININE, GLUCOSE, CALCIUM, AST, ALT, ALKPHOS, PROT, ALBUMIN, BILITOT, EGFR, Magnesium: No results found for: MAG  VTE Pharmacologic Prophylaxis: Heparin  VTE Mechanical Prophylaxis: sequential compression device

## 2018-04-03 LAB
GLUCOSE SERPL-MCNC: 101 MG/DL (ref 65–140)
GLUCOSE SERPL-MCNC: 103 MG/DL (ref 65–140)
GLUCOSE SERPL-MCNC: 109 MG/DL (ref 65–140)
GLUCOSE SERPL-MCNC: 127 MG/DL (ref 65–140)
GLUCOSE SERPL-MCNC: 130 MG/DL (ref 65–140)

## 2018-04-03 PROCEDURE — 99233 SBSQ HOSP IP/OBS HIGH 50: CPT | Performed by: INTERNAL MEDICINE

## 2018-04-03 PROCEDURE — C9113 INJ PANTOPRAZOLE SODIUM, VIA: HCPCS | Performed by: SURGERY

## 2018-04-03 PROCEDURE — 82948 REAGENT STRIP/BLOOD GLUCOSE: CPT

## 2018-04-03 PROCEDURE — 99231 SBSQ HOSP IP/OBS SF/LOW 25: CPT | Performed by: THORACIC SURGERY (CARDIOTHORACIC VASCULAR SURGERY)

## 2018-04-03 RX ADMIN — HYDROMORPHONE HYDROCHLORIDE 0.5 MG: 1 INJECTION, SOLUTION INTRAMUSCULAR; INTRAVENOUS; SUBCUTANEOUS at 19:32

## 2018-04-03 RX ADMIN — ONDANSETRON 4 MG: 2 INJECTION INTRAMUSCULAR; INTRAVENOUS at 05:11

## 2018-04-03 RX ADMIN — METOCLOPRAMIDE 10 MG: 5 INJECTION, SOLUTION INTRAMUSCULAR; INTRAVENOUS at 10:34

## 2018-04-03 RX ADMIN — HYDROMORPHONE HYDROCHLORIDE 0.5 MG: 1 INJECTION, SOLUTION INTRAMUSCULAR; INTRAVENOUS; SUBCUTANEOUS at 22:59

## 2018-04-03 RX ADMIN — HEPARIN SODIUM 5000 UNITS: 5000 INJECTION, SOLUTION INTRAVENOUS; SUBCUTANEOUS at 14:47

## 2018-04-03 RX ADMIN — CALCIUM GLUCONATE: 94 INJECTION, SOLUTION INTRAVENOUS at 00:36

## 2018-04-03 RX ADMIN — METOCLOPRAMIDE 10 MG: 5 INJECTION, SOLUTION INTRAMUSCULAR; INTRAVENOUS at 19:33

## 2018-04-03 RX ADMIN — LORAZEPAM 0.5 MG: 2 INJECTION INTRAMUSCULAR; INTRAVENOUS at 22:47

## 2018-04-03 RX ADMIN — CALCIUM GLUCONATE: 94 INJECTION, SOLUTION INTRAVENOUS at 22:47

## 2018-04-03 RX ADMIN — HEPARIN SODIUM 5000 UNITS: 5000 INJECTION, SOLUTION INTRAVENOUS; SUBCUTANEOUS at 05:11

## 2018-04-03 RX ADMIN — HEPARIN SODIUM 5000 UNITS: 5000 INJECTION, SOLUTION INTRAVENOUS; SUBCUTANEOUS at 22:47

## 2018-04-03 RX ADMIN — LORAZEPAM 0.5 MG: 2 INJECTION INTRAMUSCULAR; INTRAVENOUS at 10:18

## 2018-04-03 RX ADMIN — HYDROMORPHONE HYDROCHLORIDE 0.5 MG: 1 INJECTION, SOLUTION INTRAMUSCULAR; INTRAVENOUS; SUBCUTANEOUS at 05:11

## 2018-04-03 RX ADMIN — HYDROMORPHONE HYDROCHLORIDE 0.5 MG: 1 INJECTION, SOLUTION INTRAMUSCULAR; INTRAVENOUS; SUBCUTANEOUS at 10:23

## 2018-04-03 RX ADMIN — ONDANSETRON 4 MG: 2 INJECTION INTRAMUSCULAR; INTRAVENOUS at 18:20

## 2018-04-03 RX ADMIN — PANTOPRAZOLE SODIUM 40 MG: 40 INJECTION, POWDER, FOR SOLUTION INTRAVENOUS at 10:18

## 2018-04-03 NOTE — CONSULTS
Gastroenterology Progress Note   Unit/Bed#: [facility] 427-01 Encounter: [id number]        Addison Contreras 36 y o  female [id number]    Hospital Stay Days: 12      Assessment/Plan:    Principal Problem:    Gastric perforation [facility])  Active Problems: Moderate protein-calorie malnutrition ([address] )    Gastrocutaneous fistula  History of gastric perforation  -patient is status post gastric perforation after hiatal hernia repair at [facility] in 2018  -status post EGD with stent placement at [facility]SH on 2018  -history of gastric sleeve in 2017  -patient with abdominal drain in place  -status post EGD stent removal on 2018  -currently on TPN  -inability for endoscopic J-tube placement secondary to inability for transillumination to be performed  -per bariatric surgery recommendations plan for potential EGD with Botox to pylorus to decrease intraluminal pressure and Kale fed tube placement  -plan for potential procedure and will discuss with attending at this time        Subjective:   Patient seen and examined  Per patient and  were in room at time of examination patient has had significant hospital course  Currently her only major complaint is abdominal discomfort currently controlled current pain regimen as well as slight discomfort at drain sites on left side  She denies any chest pain, shortness of breath, vomiting or diarrhea at this time  Vitals: Temp (24hrs), Av °F (36 7 °C), Min:97 5 °F (36 4 °C), Max:98 3 °F (36 8 °C)  Current: Temperature: 97 5 °F (36 4 °C)  Vitals:    18 1207 18 1613 18 2309 18 0736   BP: 118/73 118/72 109/65 109/67   BP Location: Right arm Right arm Right arm Right arm   Pulse: 75 70 73 75   Resp:    Temp: 98 3 °F (36 8 °C) 98 2 °F (36 8 °C) 98 1 °F (36 7 °C) 97 5 °F (36 4 °C)   TempSrc: Oral Oral Oral Oral   SpO2: 98% 98% 98% 95%   Weight:       Height:        Body mass index is 21 08 kg/m²  I/O last 24 hours:   In: 539 2 [TPN:539 2]  Out: 2489 [Urine:3700; Drains:275]      Physical Exam: /67 (BP Location: Right arm)   Pulse 75   Temp 97 5 °F (36 4 °C) (Oral)   Resp 18   Ht 5' 4" (1 626 m)   Wt 55 7 kg (122 lb 12 7 oz)   LMP  (LMP Unknown)   SpO2 95%   BMI 21 08 kg/m²   General appearance: alert and oriented, in no acute distress  Head: Normocephalic, without obvious abnormality, atraumatic  Eyes: EOMI, sclera white  Lungs: clear to auscultation bilaterally and No wheezing appreciated  Heart: regular rate and rhythm and S1, S2 normal  Abdomen: Soft, slight diffuse tenderness to palpation without rebound tenderness, active bowel sounds present, gastric cutaneous fistula present with ostomy bag in place, ADENIKE drains x2 in place  Extremities: No lower extremity edema present  Invasive Devices     Peripherally Inserted Central Catheter Line            PICC Line 50/65/31 Left Basilic 7 days          Drain            Closed/Suction Drain Right Abdomen Other (Comment) -- days    Closed/Suction Drain Midline;Superior Abdomen Other (Comment) 10 Fr  45 days    NG/OG/Enteral Tube Nasogastric 8 Fr Left nares 38 days    Open Drain Midline Abdomen 29 days    Closed/Suction Drain Left;Lateral LUQ Bulb 12 Fr  27 days                          Labs:   Recent Results (from the past 24 hour(s))   Fingerstick Glucose (POCT)    Collection Time: 04/02/18  6:02 PM   Result Value Ref Range    POC Glucose 125 65 - 140 mg/dl   Fingerstick Glucose (POCT)    Collection Time: 04/03/18 12:38 AM   Result Value Ref Range    POC Glucose 101 65 - 140 mg/dl   Fingerstick Glucose (POCT)    Collection Time: 04/03/18  5:20 AM   Result Value Ref Range    POC Glucose 109 65 - 140 mg/dl       Radiology Results: I have personally reviewed pertinent reports  Other Diagnostic Testing:   I have personally reviewed pertinent reports          Active Meds:   Current Facility-Administered Medications   Medication Dose Route Frequency    Adult 3-in-1 TPN (custom base / standard electrolytes)   Intravenous Continuous    Adult 3-in-1 TPN (custom base / standard electrolytes)   Intravenous Continuous    heparin (porcine) subcutaneous injection 5,000 Units  5,000 Units Subcutaneous Q8H [facility]    HYDROmorphone (DILAUDID) injection 0 5 mg  0 5 mg Intravenous Q3H PRN    HYDROmorphone (DILAUDID) injection 1 mg  1 mg Intravenous Q3H PRN    insulin lispro (HumaLOG) 100 units/mL subcutaneous injection 1-5 Units  1-5 Units Subcutaneous Q6H [facility]    LORazepam (ATIVAN) 2 mg/mL injection 0 5 mg  0 5 mg Intravenous Q6H PRN    metoclopramide (REGLAN) injection 10 mg  10 mg Intravenous Q6H PRN    ondansetron (ZOFRAN) injection 4 mg  4 mg Intravenous Q6H PRN    pantoprazole (PROTONIX) injection 40 mg  40 mg Intravenous Q24H [facility]       Enedina Rodgers DO

## 2018-04-03 NOTE — PROGRESS NOTES
Progress Note - Thoracic Surgery   Arnoldo Fofana 36 y o  female MRN: [id number]  Unit/Bed#: [facility] 427-01 Encounter: [id number]    Assessment:  35 yo F with proximal gastric perforation following hiatal hernia repair, s/p multiple surgical interventions including esophageal stent with removal    Plan:  - Continue ADENIKE drains x2 --> minimal out from L flank ADENIKE, will consider removal  - spoke with bariatric surgery -->rec EGD with botox to pylorus to decrease intraluminal pressure, keofed placement, will consult GI  - NPO, TPN  - OOB, ambulate  - analgesia  - DVT ppx    Subjective/Objective     Subjective: Patient denies complaints  Tolerating TPN, electrolytes stable  Objective:     Vitals: Blood pressure 109/65, pulse 73, temperature 98 1 °F (36 7 °C), temperature source Oral, resp  rate 18, height 5' 4" (1 626 m), weight 55 7 kg (122 lb 12 7 oz), SpO2 98 %, not currently breastfeeding  ,Body mass index is 21 08 kg/m²  I/O       04/01 0701 - 04/02 0700 04/02 0701 - 04/03 0700    P  O  0     NG/GT 0 0     8 539 2    Total Intake(mL/kg) 808 8 (14 5) 539 2 (9 7)    Urine (mL/kg/hr) 2050 (1 5) 3700 (2 8)    Drains 340 (0 3) 175 (0 1)    Total Output 2390 3875    Net -1581 2 -3335 8                Physical Exam:  GEN: NAD  HEENT: MMM  CV: RRR  Lung: Normal effort  Ab: Soft, NT/ND, ADENIKE with oily brown output  Extrem: No CCE  Neuro:  A+Ox3    Lab, Imaging and other studies: CBC with diff: No results found for: WBC, HGB, HCT, MCV, PLT, ADJUSTEDWBC, MCH, MCHC, RDW, MPV, NRBC, BMP/CMP: No results found for: NA, K, CL, CO2, ANIONGAP, BUN, CREATININE, GLUCOSE, CALCIUM, AST, ALT, ALKPHOS, PROT, ALBUMIN, BILITOT, EGFR, Magnesium: No results found for: MAG  VTE Pharmacologic Prophylaxis: Heparin  VTE Mechanical Prophylaxis: sequential compression device

## 2018-04-04 LAB
GLUCOSE SERPL-MCNC: 113 MG/DL (ref 65–140)
GLUCOSE SERPL-MCNC: 114 MG/DL (ref 65–140)
GLUCOSE SERPL-MCNC: 123 MG/DL (ref 65–140)

## 2018-04-04 PROCEDURE — 99232 SBSQ HOSP IP/OBS MODERATE 35: CPT | Performed by: INTERNAL MEDICINE

## 2018-04-04 PROCEDURE — 99231 SBSQ HOSP IP/OBS SF/LOW 25: CPT | Performed by: THORACIC SURGERY (CARDIOTHORACIC VASCULAR SURGERY)

## 2018-04-04 PROCEDURE — 82948 REAGENT STRIP/BLOOD GLUCOSE: CPT

## 2018-04-04 PROCEDURE — 97530 THERAPEUTIC ACTIVITIES: CPT

## 2018-04-04 PROCEDURE — C9113 INJ PANTOPRAZOLE SODIUM, VIA: HCPCS | Performed by: SURGERY

## 2018-04-04 RX ADMIN — PANTOPRAZOLE SODIUM 40 MG: 40 INJECTION, POWDER, FOR SOLUTION INTRAVENOUS at 10:10

## 2018-04-04 RX ADMIN — CALCIUM GLUCONATE: 94 INJECTION, SOLUTION INTRAVENOUS at 21:40

## 2018-04-04 RX ADMIN — HEPARIN SODIUM 5000 UNITS: 5000 INJECTION, SOLUTION INTRAVENOUS; SUBCUTANEOUS at 21:40

## 2018-04-04 RX ADMIN — LORAZEPAM 0.5 MG: 2 INJECTION INTRAMUSCULAR; INTRAVENOUS at 21:39

## 2018-04-04 RX ADMIN — ONDANSETRON 4 MG: 2 INJECTION INTRAMUSCULAR; INTRAVENOUS at 01:53

## 2018-04-04 RX ADMIN — HYDROMORPHONE HYDROCHLORIDE 0.5 MG: 1 INJECTION, SOLUTION INTRAMUSCULAR; INTRAVENOUS; SUBCUTANEOUS at 18:44

## 2018-04-04 RX ADMIN — HYDROMORPHONE HYDROCHLORIDE 0.5 MG: 1 INJECTION, SOLUTION INTRAMUSCULAR; INTRAVENOUS; SUBCUTANEOUS at 23:41

## 2018-04-04 RX ADMIN — HEPARIN SODIUM 5000 UNITS: 5000 INJECTION, SOLUTION INTRAVENOUS; SUBCUTANEOUS at 06:22

## 2018-04-04 RX ADMIN — ONDANSETRON 4 MG: 2 INJECTION INTRAMUSCULAR; INTRAVENOUS at 21:40

## 2018-04-04 RX ADMIN — ONDANSETRON 4 MG: 2 INJECTION INTRAMUSCULAR; INTRAVENOUS at 15:39

## 2018-04-04 RX ADMIN — LORAZEPAM 0.5 MG: 2 INJECTION INTRAMUSCULAR; INTRAVENOUS at 15:43

## 2018-04-04 RX ADMIN — HYDROMORPHONE HYDROCHLORIDE 0.5 MG: 1 INJECTION, SOLUTION INTRAMUSCULAR; INTRAVENOUS; SUBCUTANEOUS at 04:05

## 2018-04-04 RX ADMIN — LORAZEPAM 0.5 MG: 2 INJECTION INTRAMUSCULAR; INTRAVENOUS at 04:30

## 2018-04-04 RX ADMIN — HYDROMORPHONE HYDROCHLORIDE 0.5 MG: 1 INJECTION, SOLUTION INTRAMUSCULAR; INTRAVENOUS; SUBCUTANEOUS at 12:17

## 2018-04-04 RX ADMIN — HEPARIN SODIUM 5000 UNITS: 5000 INJECTION, SOLUTION INTRAVENOUS; SUBCUTANEOUS at 14:49

## 2018-04-04 NOTE — PROGRESS NOTES
Progress Note - Thoracic Surgery   Amara Brizuela 36 y o  female MRN: [id number]  Unit/Bed#: [facility] 427-01 Encounter: [id number]    Assessment:  37 yo F with proximal gastric perforation following hiatal hernia repair, s/p multiple surgical interventions including esophageal stent with removal    Plan:  - NPO  - reorder TPN  - ADENIKE drains to suction  - f/u GI eval for botox injection  - OOB, ambulate  - analgesia  - SQH/SCDs      Subjective/Objective     Subjective: No complaints, tolerating TPN, still NPO  Objective:     Vitals: Blood pressure 108/60, pulse 91, temperature 98 8 °F (37 1 °C), temperature source Oral, resp  rate 18, height 5' 4" (1 626 m), weight 55 7 kg (122 lb 12 7 oz), SpO2 95 %, not currently breastfeeding  ,Body mass index is 21 08 kg/m²  I/O       04/01 0701 - 04/02 0700 04/02 0701 - 04/03 0700    P  O  0     NG/GT 0 0     8 539 2    Total Intake(mL/kg) 808 8 (14 5) 539 2 (9 7)    Urine (mL/kg/hr) 2050 (1 5) 3700 (2 8)    Drains 340 (0 3) 175 (0 1)    Total Output 2390 3875    Net -1581 2 -3335 8                Physical Exam:  NAD  Norm resp effort  RRR  Abd soft, NT/ND, wound manager empty, ADENIKE midline with brownish output      Lab, Imaging and other studies: CBC with diff: No results found for: WBC, HGB, HCT, MCV, PLT, ADJUSTEDWBC, MCH, MCHC, RDW, MPV, NRBC, BMP/CMP: No results found for: NA, K, CL, CO2, ANIONGAP, BUN, CREATININE, GLUCOSE, CALCIUM, AST, ALT, ALKPHOS, PROT, ALBUMIN, BILITOT, EGFR, Magnesium: No results found for: MAG  VTE Pharmacologic Prophylaxis: Heparin  VTE Mechanical Prophylaxis: sequential compression device

## 2018-04-04 NOTE — PLAN OF CARE
Problem: PHYSICAL THERAPY ADULT  Goal: Performs mobility at highest level of function for planned discharge setting  See evaluation for individualized goals  Treatment/Interventions: Functional transfer training, LE strengthening/ROM, Elevations, Therapeutic exercise, Endurance training, Bed mobility, Gait training, Equipment eval/education, Spoke to nursing, Spoke to case management, OT  Equipment Recommended:  (TBD)       See flowsheet documentation for full assessment, interventions and recommendations  Outcome: Progressing  Prognosis: Good  Problem List: Decreased strength, Decreased endurance, Decreased mobility  Assessment: Pt cont to demonstrate gradual improvement in functional mobility skills progressing w/ amb distance and less (A) required overall; pt still remains to be generally weak and deconditioned w/ bouts of abd discomfort expressed; will cont to follow pt in PT for graded mobilization to max level of (I), endurance, and safety; D/C recommendations (home w/ family support and home PT follow up vs rehab) will depend on clinical course/med needs, progress w/ mobility skills and level of (A) required vs available at home; will follow  Barriers to Discharge: Inaccessible home environment     Recommendation: Other (Comment), Defer at this time (see above assessment;)          See flowsheet documentation for full assessment

## 2018-04-04 NOTE — PROGRESS NOTES
Gastroenterology Progress Note   Unit/Bed#: [facility] 427-01 Encounter: [id number]        Bernardo Grayson 36 y o  female [id number]    Hospital Stay Days: 13      Assessment/Plan:    Principal Problem:    Gastric perforation [facility])  Active Problems: Moderate protein-calorie malnutrition ([address] )    Gastrocutaneous fistula  History of gastric perforation  -status post gastric perforation after hiatal hernia repair and EGD with stent placement at [facility] now status post stent removal on 2018  -currently on TPN  -patient with abdominal drain in place  -likely plan for EGD with Botox to pylorus to decrease intraluminal pressure and Kaofed tube placement to allow for enteral nutrition  -will discuss with attending      Subjective:   Patient seen and examined  Per patient her  in room at time of examination patient currently main discomfort appears to be abdominal pain as well as smell coming from gastrocutaneous fistula  Patient denies any shortness of breath or chest pain  She denies any fevers or chills, or diarrhea at this time  She states this now coming from the fistula does make her nauseous  Vitals: Temp (24hrs), Av 1 °F (36 7 °C), Min:97 3 °F (36 3 °C), Max:98 8 °F (37 1 °C)  Current: Temperature: 98 2 °F (36 8 °C)  Vitals:    18 0736 18 1551 18 2333 18 0728   BP: 109/67 114/58 108/60 112/65   BP Location: Right arm Right arm Right arm Right arm   Pulse: 75 79 91 85   Resp:  18 20   Temp: 97 5 °F (36 4 °C) (!) 97 3 °F (36 3 °C) 98 8 °F (37 1 °C) 98 2 °F (36 8 °C)   TempSrc: Oral Oral Oral Oral   SpO2: 95% 98% 95% 95%   Weight:       Height:        Body mass index is 21 08 kg/m²  I/O last 24 hours:   In:  4 [TPN: 4]  Out:  [Urine:; Drains:240]      Physical Exam: /65 (BP Location: Right arm)   Pulse 85   Temp 98 2 °F (36 8 °C) (Oral)   Resp 20   Ht 5' 4" (1 626 m)   Wt 55 7 kg (122 lb 12 7 oz)   LMP  (LMP Unknown)   SpO2 95%   BMI 21 08 kg/m²   General appearance: alert and oriented, in no acute distress  Head: Normocephalic, without obvious abnormality, atraumatic  Eyes: EOMI, sclera white  Lungs: clear to auscultation bilaterally and No wheezing appreciated  Heart: regular rate and rhythm and S1, S2 normal  Abdomen: Soft, slight diffuse tenderness to palpation without rebound tenderness, active bowel sounds present, gastric cutaneous fistula present with ostomy bag in place and ADENIKE drains x2 in place  Extremities: No lower extremity edema present     Invasive Devices     Peripherally Inserted Central Catheter Line            PICC Line 17/21/70 Left Basilic 8 days          Drain            Closed/Suction Drain Right Abdomen Other (Comment) -- days    Closed/Suction Drain Midline;Superior Abdomen Other (Comment) 10 Fr  46 days    NG/OG/Enteral Tube Nasogastric 8 Fr Left nares 39 days    Open Drain Midline Abdomen 30 days    Closed/Suction Drain Left;Lateral LUQ Bulb 12 Fr  28 days                          Labs:   Recent Results (from the past 24 hour(s))   Fingerstick Glucose (POCT)    Collection Time: 04/03/18 12:51 PM   Result Value Ref Range    POC Glucose 127 65 - 140 mg/dl   Fingerstick Glucose (POCT)    Collection Time: 04/03/18  6:15 PM   Result Value Ref Range    POC Glucose 130 65 - 140 mg/dl   Fingerstick Glucose (POCT)    Collection Time: 04/03/18 11:31 PM   Result Value Ref Range    POC Glucose 103 65 - 140 mg/dl   Fingerstick Glucose (POCT)    Collection Time: 04/04/18  6:17 AM   Result Value Ref Range    POC Glucose 123 65 - 140 mg/dl       Radiology Results: I have personally reviewed pertinent reports  Other Diagnostic Testing:   I have personally reviewed pertinent reports          Active Meds:   Current Facility-Administered Medications   Medication Dose Route Frequency    Adult 3-in-1 TPN (custom base / standard electrolytes)   Intravenous Continuous    Adult 3-in-1 TPN (custom base / standard electrolytes) Intravenous Continuous    heparin (porcine) subcutaneous injection 5,000 Units  5,000 Units Subcutaneous Q8H [address]    HYDROmorphone (DILAUDID) injection 0 5 mg  0 5 mg Intravenous Q3H PRN    HYDROmorphone (DILAUDID) injection 1 mg  1 mg Intravenous Q3H PRN    insulin lispro (HumaLOG) 100 units/mL subcutaneous injection 1-5 Units  1-5 Units Subcutaneous Q6H [address]    LORazepam (ATIVAN) 2 mg/mL injection 0 5 mg  0 5 mg Intravenous Q6H PRN    metoclopramide (REGLAN) injection 10 mg  10 mg Intravenous Q6H PRN    ondansetron (ZOFRAN) injection 4 mg  4 mg Intravenous Q6H PRN    pantoprazole (PROTONIX) injection 40 mg  40 mg Intravenous Q24H [address]       Mayra Suazo DO

## 2018-04-04 NOTE — PHYSICAL THERAPY NOTE
PHYSICAL THERAPY NOTE          Patient Name: Pauly Rogers  OHUNR'G Date: 4/4/2018 04/04/18 1513   Pain Assessment   Pain Assessment FLACC   Pain Type Acute pain;Surgical pain   Pain Location Abdomen; Incision   Pain Descriptors Aching;Discomfort   Pain Frequency Intermittent   Pain Onset Ongoing   Clinical Progression Not changed   Effect of Pain on Daily Activities occasional bout of discomfort during the session   Patient's Stated Pain Goal No pain   Hospital Pain Intervention(s) Medication (See MAR); Repositioned; Ambulation/increased activity; Distraction; Emotional support   Response to Interventions comfortable once sitting   Pain Rating: FLACC (Rest) - Face 0   Pain Rating: FLACC (Rest) - Legs 0   Pain Rating: FLACC (Rest) - Activity 0   Pain Rating: FLACC (Rest) - Cry 0   Pain Rating: FLACC (Rest) - Consolability 0   Score: FLACC (Rest) 0   Pain Rating: FLACC (Activity) - Face 1   Pain Rating: FLACC (Activity) - Legs 0   Pain Rating: FLACC (Activity) - Activity 0   Pain Rating: FLACC (Activity) - Cry 1   Pain Rating: FLACC (Activity) - Consolability 0   Score: FLACC (Activity) 2   Restrictions/Precautions   Other Precautions Multiple lines; Fall Risk  ((L) ADENIKE x 2;)   General   Chart Reviewed Yes   Additional Pertinent History cleared for Tx session (spoke to nsg)   Bed Mobility   Supine to Sit 6  Modified independent   Transfers   Sit to Stand 5  Supervision   Additional items Assist x 1;Verbal cues   Stand to Sit 5  Supervision   Additional items Verbal cues; Assist x 1   Ambulation/Elevation   Gait pattern Excessively slow; Short stride; Inconsistent bree; Shuffling   Gait Assistance 5  Supervision   Additional items Assist x 1;Verbal cues; Tactile cues   Assistive Device None   Distance 200 ft   Balance   Static Sitting Fair +   Static Standing Fair   Ambulatory Fair -   Activity Tolerance   Activity Tolerance Patient limited by fatigue;Patient limited by pain   Nurse Made Aware spoke to José Monson, RN   Equipment Use   Comments After the session, pt was taken on the w/c to [facility] to wash her hair w/ staff present   Assessment   Prognosis Good   Problem List Decreased strength;Decreased endurance;Decreased mobility   Assessment Pt cont to demonstrate gradual improvement in functional mobility skills progressing w/ amb distance and less (A) required overall; pt still remains to be generally weak and deconditioned w/ bouts of abd discomfort expressed; will cont to follow pt in PT for graded mobilization to max level of (I), endurance, and safety; D/C recommendations (home w/ family support and home PT follow up vs rehab) will depend on clinical course/med needs, progress w/ mobility skills and level of (A) required vs available at home; will follow  Barriers to Discharge Inaccessible home environment   Goals   Patient Goals none expressed   LTG Expiration Date 04/11/18   Treatment Day 3   Plan   Treatment/Interventions Functional transfer training;LE strengthening/ROM; Elevations; Therapeutic exercise; Endurance training;Gait training;Spoke to nursing   Progress Progressing toward goals   PT Frequency 5x/wk   Recommendation   Recommendation Other (Comment); Defer at this time  (see above assessment;)       Gayle Cherry, PT

## 2018-04-05 ENCOUNTER — ANESTHESIA EVENT (INPATIENT)
Dept: GASTROENTEROLOGY | Facility: HOSPITAL | Age: 43
DRG: 252 | End: 2018-04-05
Payer: COMMERCIAL

## 2018-04-05 ENCOUNTER — ANESTHESIA (INPATIENT)
Dept: GASTROENTEROLOGY | Facility: HOSPITAL | Age: 43
DRG: 252 | End: 2018-04-05
Payer: COMMERCIAL

## 2018-04-05 LAB
ANION GAP SERPL CALCULATED.3IONS-SCNC: 6 MMOL/L (ref 4–13)
BASOPHILS # BLD AUTO: 0.04 THOUSANDS/ΜL (ref 0–0.1)
BASOPHILS NFR BLD AUTO: 1 % (ref 0–1)
BUN SERPL-MCNC: 16 MG/DL (ref 5–25)
CALCIUM SERPL-MCNC: 8.4 MG/DL (ref 8.3–10.1)
CHLORIDE SERPL-SCNC: 103 MMOL/L (ref 100–108)
CO2 SERPL-SCNC: 28 MMOL/L (ref 21–32)
CREAT SERPL-MCNC: 0.49 MG/DL (ref 0.6–1.3)
EOSINOPHIL # BLD AUTO: 0.1 THOUSAND/ΜL (ref 0–0.61)
EOSINOPHIL NFR BLD AUTO: 2 % (ref 0–6)
ERYTHROCYTE [DISTWIDTH] IN BLOOD BY AUTOMATED COUNT: 16.3 % (ref 11.6–15.1)
GFR SERPL CREATININE-BSD FRML MDRD: 120 ML/MIN/1.73SQ M
GLUCOSE SERPL-MCNC: 113 MG/DL (ref 65–140)
GLUCOSE SERPL-MCNC: 115 MG/DL (ref 65–140)
GLUCOSE SERPL-MCNC: 120 MG/DL (ref 65–140)
GLUCOSE SERPL-MCNC: 122 MG/DL (ref 65–140)
GLUCOSE SERPL-MCNC: 432 MG/DL (ref 65–140)
GLUCOSE SERPL-MCNC: 462 MG/DL (ref 65–140)
HCT VFR BLD AUTO: 29.2 % (ref 34.8–46.1)
HGB BLD-MCNC: 9.2 G/DL (ref 11.5–15.4)
LYMPHOCYTES # BLD AUTO: 0.9 THOUSANDS/ΜL (ref 0.6–4.47)
LYMPHOCYTES NFR BLD AUTO: 18 % (ref 14–44)
MAGNESIUM SERPL-MCNC: 2 MG/DL (ref 1.6–2.6)
MCH RBC QN AUTO: 27.5 PG (ref 26.8–34.3)
MCHC RBC AUTO-ENTMCNC: 31.5 G/DL (ref 31.4–37.4)
MCV RBC AUTO: 87 FL (ref 82–98)
MONOCYTES # BLD AUTO: 0.61 THOUSAND/ΜL (ref 0.17–1.22)
MONOCYTES NFR BLD AUTO: 12 % (ref 4–12)
NEUTROPHILS # BLD AUTO: 3.4 THOUSANDS/ΜL (ref 1.85–7.62)
NEUTS SEG NFR BLD AUTO: 67 % (ref 43–75)
NRBC BLD AUTO-RTO: 0 /100 WBCS
PHOSPHATE SERPL-MCNC: 4.2 MG/DL (ref 2.7–4.5)
PLATELET # BLD AUTO: 224 THOUSANDS/UL (ref 149–390)
PMV BLD AUTO: 10.4 FL (ref 8.9–12.7)
POTASSIUM SERPL-SCNC: 4.4 MMOL/L (ref 3.5–5.3)
RBC # BLD AUTO: 3.34 MILLION/UL (ref 3.81–5.12)
SODIUM SERPL-SCNC: 137 MMOL/L (ref 136–145)
WBC # BLD AUTO: 5.06 THOUSAND/UL (ref 4.31–10.16)

## 2018-04-05 PROCEDURE — C9113 INJ PANTOPRAZOLE SODIUM, VIA: HCPCS | Performed by: SURGERY

## 2018-04-05 PROCEDURE — 84100 ASSAY OF PHOSPHORUS: CPT | Performed by: STUDENT IN AN ORGANIZED HEALTH CARE EDUCATION/TRAINING PROGRAM

## 2018-04-05 PROCEDURE — 88341 IMHCHEM/IMCYTCHM EA ADD ANTB: CPT | Performed by: PATHOLOGY

## 2018-04-05 PROCEDURE — 88342 IMHCHEM/IMCYTCHM 1ST ANTB: CPT | Performed by: PATHOLOGY

## 2018-04-05 PROCEDURE — 82948 REAGENT STRIP/BLOOD GLUCOSE: CPT

## 2018-04-05 PROCEDURE — 85025 COMPLETE CBC W/AUTO DIFF WBC: CPT | Performed by: STUDENT IN AN ORGANIZED HEALTH CARE EDUCATION/TRAINING PROGRAM

## 2018-04-05 PROCEDURE — 88305 TISSUE EXAM BY PATHOLOGIST: CPT | Performed by: PATHOLOGY

## 2018-04-05 PROCEDURE — 0DB68ZX EXCISION OF STOMACH, VIA NATURAL OR ARTIFICIAL OPENING ENDOSCOPIC, DIAGNOSTIC: ICD-10-PCS | Performed by: INTERNAL MEDICINE

## 2018-04-05 PROCEDURE — 43239 EGD BIOPSY SINGLE/MULTIPLE: CPT | Performed by: INTERNAL MEDICINE

## 2018-04-05 PROCEDURE — 3E0G8GC INTRODUCTION OF OTHER THERAPEUTIC SUBSTANCE INTO UPPER GI, VIA NATURAL OR ARTIFICIAL OPENING ENDOSCOPIC: ICD-10-PCS | Performed by: INTERNAL MEDICINE

## 2018-04-05 PROCEDURE — 80048 BASIC METABOLIC PNL TOTAL CA: CPT | Performed by: STUDENT IN AN ORGANIZED HEALTH CARE EDUCATION/TRAINING PROGRAM

## 2018-04-05 PROCEDURE — 0DH98UZ INSERTION OF FEEDING DEVICE INTO DUODENUM, VIA NATURAL OR ARTIFICIAL OPENING ENDOSCOPIC: ICD-10-PCS | Performed by: INTERNAL MEDICINE

## 2018-04-05 PROCEDURE — 83735 ASSAY OF MAGNESIUM: CPT | Performed by: STUDENT IN AN ORGANIZED HEALTH CARE EDUCATION/TRAINING PROGRAM

## 2018-04-05 PROCEDURE — 43236 UPPR GI SCOPE W/SUBMUC INJ: CPT | Performed by: INTERNAL MEDICINE

## 2018-04-05 PROCEDURE — 99231 SBSQ HOSP IP/OBS SF/LOW 25: CPT | Performed by: THORACIC SURGERY (CARDIOTHORACIC VASCULAR SURGERY)

## 2018-04-05 RX ORDER — SODIUM CHLORIDE 9 MG/ML
INJECTION, SOLUTION INTRAVENOUS CONTINUOUS PRN
Status: DISCONTINUED | OUTPATIENT
Start: 2018-04-05 | End: 2018-04-05 | Stop reason: SURG

## 2018-04-05 RX ORDER — PROPOFOL 10 MG/ML
INJECTION, EMULSION INTRAVENOUS CONTINUOUS PRN
Status: DISCONTINUED | OUTPATIENT
Start: 2018-04-05 | End: 2018-04-05 | Stop reason: SURG

## 2018-04-05 RX ORDER — PROPOFOL 10 MG/ML
INJECTION, EMULSION INTRAVENOUS AS NEEDED
Status: DISCONTINUED | OUTPATIENT
Start: 2018-04-05 | End: 2018-04-05 | Stop reason: SURG

## 2018-04-05 RX ADMIN — PROPOFOL 70 MG: 10 INJECTION, EMULSION INTRAVENOUS at 10:03

## 2018-04-05 RX ADMIN — PROPOFOL 30 MG: 10 INJECTION, EMULSION INTRAVENOUS at 10:07

## 2018-04-05 RX ADMIN — PROPOFOL 100 MCG/KG/MIN: 10 INJECTION, EMULSION INTRAVENOUS at 10:03

## 2018-04-05 RX ADMIN — LORAZEPAM 0.5 MG: 2 INJECTION INTRAMUSCULAR; INTRAVENOUS at 20:14

## 2018-04-05 RX ADMIN — ONDANSETRON 4 MG: 2 INJECTION INTRAMUSCULAR; INTRAVENOUS at 17:33

## 2018-04-05 RX ADMIN — HEPARIN SODIUM 5000 UNITS: 5000 INJECTION, SOLUTION INTRAVENOUS; SUBCUTANEOUS at 21:39

## 2018-04-05 RX ADMIN — HYDROMORPHONE HYDROCHLORIDE 0.5 MG: 1 INJECTION, SOLUTION INTRAMUSCULAR; INTRAVENOUS; SUBCUTANEOUS at 17:33

## 2018-04-05 RX ADMIN — ONDANSETRON 4 MG: 2 INJECTION INTRAMUSCULAR; INTRAVENOUS at 09:31

## 2018-04-05 RX ADMIN — HYDROMORPHONE HYDROCHLORIDE 0.5 MG: 1 INJECTION, SOLUTION INTRAMUSCULAR; INTRAVENOUS; SUBCUTANEOUS at 12:18

## 2018-04-05 RX ADMIN — LORAZEPAM 0.5 MG: 2 INJECTION INTRAMUSCULAR; INTRAVENOUS at 03:29

## 2018-04-05 RX ADMIN — HEPARIN SODIUM 5000 UNITS: 5000 INJECTION, SOLUTION INTRAVENOUS; SUBCUTANEOUS at 14:26

## 2018-04-05 RX ADMIN — SODIUM CHLORIDE: 0.9 INJECTION, SOLUTION INTRAVENOUS at 09:59

## 2018-04-05 RX ADMIN — CALCIUM GLUCONATE: 94 INJECTION, SOLUTION INTRAVENOUS at 21:30

## 2018-04-05 RX ADMIN — ONDANSETRON 4 MG: 2 INJECTION INTRAMUSCULAR; INTRAVENOUS at 03:29

## 2018-04-05 RX ADMIN — PANTOPRAZOLE SODIUM 40 MG: 40 INJECTION, POWDER, FOR SOLUTION INTRAVENOUS at 12:17

## 2018-04-05 RX ADMIN — PROPOFOL 20 MG: 10 INJECTION, EMULSION INTRAVENOUS at 10:04

## 2018-04-05 RX ADMIN — HYDROMORPHONE HYDROCHLORIDE 0.5 MG: 1 INJECTION, SOLUTION INTRAMUSCULAR; INTRAVENOUS; SUBCUTANEOUS at 21:39

## 2018-04-05 RX ADMIN — HEPARIN SODIUM 5000 UNITS: 5000 INJECTION, SOLUTION INTRAVENOUS; SUBCUTANEOUS at 06:24

## 2018-04-05 NOTE — PHYSICAL THERAPY NOTE
Physical Therapy Cancellation Note  Pt refused stating "I feel nauseous and I want to stay in bed right now "  Will follow as appropriate      Servando Wihte, PTA

## 2018-04-05 NOTE — ANESTHESIA PREPROCEDURE EVALUATION
Review of Systems/Medical History  Patient summary reviewed  Chart reviewed  No history of anesthetic complications     Cardiovascular    Comment: ECHO 1/ 2018--EF 07%, normal systolic fxn, no reg abnml,  Pulmonary  Smoker ex-smoker  ,        GI/Hepatic    GERD ,   Comment: proximal gastric perforation following hiatal hernia repair, s/p multiple surgical interventions including esophageal stent with removal    On TPN    Has nausea, reglan and zofran provided     Negative  ROS        Endo/Other    Comment: Right eye choroidal nevus    GYN  Negative gynecology ROS          Hematology  Negative hematology ROS      Musculoskeletal  Negative musculoskeletal ROS   Arthritis     Neurology    Headaches,   Comment: Idiopathic intracranial hypertension--s/p  shunt 5/30/2017--in light of current sepsis, shunt removed 2/5/18 Psychology   Negative psychology ROS              Physical Exam    Airway    Mallampati score: II  TM Distance: >3 FB  Neck ROM: full     Dental   Comment: Secure cap upper front tooth as per patient, No notable dental hx     Cardiovascular      Pulmonary      Other Findings        Anesthesia Plan  ASA Score- 2     Anesthesia Type- IV sedation with anesthesia with ASA Monitors  Additional Monitors:   Airway Plan:     Comment: TIVA vs  GA with ETT discussed  Plan Factors- Instructed to abstain from smoking on day of procedure: nonsmoker    Patient smoked on day of surgery: nonsmoker       Induction- intravenous  Postoperative Plan-     Informed Consent- Anesthetic plan and risks discussed with patient  I personally reviewed this patient with the CRNA  Discussed and agreed on the Anesthesia Plan with the CRNA  Zuleika Greenberg

## 2018-04-05 NOTE — ANESTHESIA POSTPROCEDURE EVALUATION
Post-Op Assessment Note      CV Status:  Stable    Mental Status:  Alert and awake    Hydration Status:  Euvolemic    PONV Controlled:  Controlled    Airway Patency:  Patent    Post Op Vitals Reviewed: Yes          Staff: CRNA, Anesthesiologist           /95 (04/05/18 1040)    Temp     Pulse 82 (04/05/18 1040)   Resp 18 (04/05/18 1040)    SpO2 100 % (04/05/18 1040)

## 2018-04-05 NOTE — SOCIAL WORK
CSWS was consulted to set up an escalated team meeting with the following individuals present: Thoracic Surgery, [facility], PT, OT, and GI  CSWS attempted to meet with patient this morning to discuss the meeting and to obtain consent from patient to schedule the meeting and contact her mother and significant other to also be present in the meeting, however patient is currently off the unit  CSWS will follow up when patient returns  MSW Shelby CHERY is aware of above information

## 2018-04-05 NOTE — OP NOTE
**** GI/ENDOSCOPY REPORT ****     PATIENT NAME: AIDEE NICK - VISIT ID:  Patient ID: [id number]   YOB: 1975     INTRODUCTION: Esophagogastroduodenoscopy - A 36 female patient presents   for an inpatient Esophagogastroduodenoscopy at [facility]  INDICATIONS: Need for kaofed tube placement as well as botox to pylorus  CONSENT: The benefits, risks, and alternatives to the procedure were   discussed and informed consent was obtained from the patient  PREPARATION:  EKG, pulse, pulse oximetry and blood pressure were monitored   throughout the procedure  MEDICATIONS: Anesthesia-check records     PROCEDURE:  The endoscope was passed without difficulty through the mouth   under direct visualization and advanced to the 2nd portion of the   duodenum  The scope was withdrawn and the mucosa was carefully examined  FINDINGS:   Esophagus: The esophagus appeared to be normal   Stomach:   Evidence of known gastric perforation found  Large area in the antrum of   the stomach with scar tissue, erythematous, and edematous areas  A cold   forceps biopsy was taken  Successful botox injection in 4 quadrants to   her antrum  Duodenum: The 1st portion of the duodenum, 2nd portion of   the duodenum, and 3rd portion of the duodenum appeared to be normal    Successful endoscopic placement of a koreflo placement deep in the 3rd   part of the duodenum  COMPLICATIONS: There were no complications  IMPRESSIONS: Normal esophagus  Evidence of known gastric perforation   found  Large area in the antrum of the stomach with scar tissue,   erythematous, and edematous areas  Biopsied  Successful botox injection   to the antrum  Normal 1st portion of the duodenum, 2nd portion of the   duodenum, and 3rd portion of the duodenum  RECOMMENDATIONS: Can use Koreflo for feedings    Discussed with surgical   team      ESTIMATED BLOOD LOSS:     PATHOLOGY SPECIMENS: Cold forceps biopsy taken  Associated finding: Large   area in the antrum of the stomach with scar tissue and erythematous,   edematous areas  PROCEDURE CODES:     ICD-9 Codes:     ICD-10 Codes:     PERFORMED BY: JOE Gandhi  on 04/05/2018  Version 2, modified and electronically signed by JOE Valencia    on 04/05/2018 at 18:00, superseding version 1 signed on 04/05/2018 at   16:48

## 2018-04-05 NOTE — OCCUPATIONAL THERAPY NOTE
OT CANCEL NOTE    Attempted to see pt this PM however pt requested to stay in bed and refused further ambulation or functional activities  Pt reported she did not want to get nauseous and has been spitting up bile  Despite encouragement, pt did not want to engage in OT treatment at this time  Will continue to follow pt on caseload and see as clinically able       Diann MIRANDA, OTR/L

## 2018-04-05 NOTE — OP NOTE
**** GI/ENDOSCOPY REPORT ****     PATIENT NAME: AIDEE NICK - VISIT ID:  Patient ID: [id number]   YOB: 1975     INTRODUCTION: Esophagogastroduodenoscopy - A 36 female patient presents   for an inpatient Esophagogastroduodenoscopy at [facility]  INDICATIONS: Need for kaofed tube placement as well as botox to pylorus  CONSENT: The benefits, risks, and alternatives to the procedure were   discussed and informed consent was obtained from the patient  PREPARATION:  EKG, pulse, pulse oximetry and blood pressure were monitored   throughout the procedure  MEDICATIONS: Anesthesia-check records     PROCEDURE:  The endoscope was passed without difficulty through the mouth   under direct visualization and advanced to the 2nd portion of the   duodenum  The scope was withdrawn and the mucosa was carefully examined  FINDINGS:   Esophagus: The esophagus appeared to be normal   Stomach:   Evidence of known gastric perforation found  Large area in the antrum of   the stomach with scar tissue, erythematous, and edematous areas  A cold   forceps biopsy was taken  Duodenum: The 1st portion of the duodenum, 2nd   portion of the duodenum, and 3rd portion of the duodenum appeared to be   normal  Successful endoscopic placement of a koreflo placement deep in the   3rd part of the duodenum  COMPLICATIONS: There were no complications  IMPRESSIONS: Normal esophagus  Evidence of known gastric perforation   found  Large area in the antrum of the stomach with scar tissue,   erythematous, and edematous areas  Biopsied  Normal 1st portion of the   duodenum, 2nd portion of the duodenum, and 3rd portion of the duodenum  RECOMMENDATIONS: Can use Koreflo for feedings  Discussed with surgical   team      ESTIMATED BLOOD LOSS:     PATHOLOGY SPECIMENS: Cold forceps biopsy taken   Associated finding: Large   area in the antrum of the stomach with scar tissue and erythematous,   edematous areas  PROCEDURE CODES:     ICD-9 Codes:     ICD-10 Codes:     PERFORMED BY: JOE Zimmer  on 04/05/2018  Version 1, electronically signed by JOE Serrano  on   04/05/2018 at 16:48

## 2018-04-05 NOTE — PROGRESS NOTES
Progress Note - Thoracic Surgery   Effie Villalobos 36 y o  female MRN: [id number]  Unit/Bed#: [facility] 427-01 Encounter: [id number]    Assessment:  37 yo F with proximal gastric perforation following hiatal hernia repair, s/p multiple surgical interventions including esophageal stent with removal     Plan:  - NPO  - reorder TPN  - ADENIKE drains to suction  - today will go to with GI for NJ tube placement and botox injection  - OOB, ambulate  - analgesia  - SQH/SCDs      Subjective/Objective     Subjective: No complaints, nervous about procedure today  Objective:     Vitals: Blood pressure 109/61, pulse 85, temperature 99 °F (37 2 °C), temperature source Oral, resp  rate 16, height 5' 4" (1 626 m), weight 55 7 kg (122 lb 12 7 oz), SpO2 96 %, not currently breastfeeding  ,Body mass index is 21 08 kg/m²  I/O       04/01 0701 - 04/02 0700 04/02 0701 - 04/03 0700    P  O  0     NG/GT 0 0     8 539 2    Total Intake(mL/kg) 808 8 (14 5) 539 2 (9 7)    Urine (mL/kg/hr) 2050 (1 5) 3700 (2 8)    Drains 340 (0 3) 175 (0 1)    Total Output 2390 3875    Net -1581 2 -3335 8                Physical Exam:  NAD  Norm resp effort  RRR  Abd soft, NT/ND, wound manager empty, ADENIKE midline to suction with minimal brown tinged output  TPN running      Lab, Imaging and other studies: CBC with diff: No results found for: WBC, HGB, HCT, MCV, PLT, ADJUSTEDWBC, MCH, MCHC, RDW, MPV, NRBC, BMP/CMP: No results found for: NA, K, CL, CO2, ANIONGAP, BUN, CREATININE, GLUCOSE, CALCIUM, AST, ALT, ALKPHOS, PROT, ALBUMIN, BILITOT, EGFR, Magnesium: No results found for: MAG  VTE Pharmacologic Prophylaxis: Heparin  VTE Mechanical Prophylaxis: sequential compression device

## 2018-04-05 NOTE — SOCIAL WORK
CSWS attempted to meet with patient once patient returned to the floor about scheduling an escalated meeting  Patient was currently with nursing staff CSWS unable to meet with patient at this time to discuss consent to schedule meeting and invite family to meeting  CSWS informed RN CM and MSW CM of the above information

## 2018-04-05 NOTE — SOCIAL WORK
CSWS spoke with patient who consented to the escalated team meeting  CSWS spoke with patient's daughter, Marlene Wells the best day for her and patient's mother would be tomorrow, 4/6/18  CSWS is awaiting response from Thoracic Surgery and [facility] to confirm availability for a meeting tomorrow  CSWS spoke with GI an afternoon time meeting time would be best for their schedule  Update: Per [facility] if Dr Kolton Guillen attend the meeting they will have the chief resident attend the meeting tomorrow  You ROSALES CM is aware of above information

## 2018-04-05 NOTE — SOCIAL WORK
CSWS confirmed a meeting as requested by CM with all requested disciplines (Thorasic Surgery, GI, [facility], PT, OT, CM and patient's family: patient's mother and patient's daughter) for tomorrow, 4/6/18 at 1 pm     Voicemail was left for patient's significant other to invite patient to the meeting  Patient was also informed of the above information

## 2018-04-06 LAB
ANION GAP SERPL CALCULATED.3IONS-SCNC: 2 MMOL/L (ref 4–13)
BASOPHILS # BLD AUTO: 0.03 THOUSANDS/ΜL (ref 0–0.1)
BASOPHILS NFR BLD AUTO: 1 % (ref 0–1)
BUN SERPL-MCNC: 19 MG/DL (ref 5–25)
CALCIUM SERPL-MCNC: 8.5 MG/DL (ref 8.3–10.1)
CHLORIDE SERPL-SCNC: 109 MMOL/L (ref 100–108)
CO2 SERPL-SCNC: 30 MMOL/L (ref 21–32)
CREAT SERPL-MCNC: 0.34 MG/DL (ref 0.6–1.3)
EOSINOPHIL # BLD AUTO: 0.14 THOUSAND/ΜL (ref 0–0.61)
EOSINOPHIL NFR BLD AUTO: 3 % (ref 0–6)
ERYTHROCYTE [DISTWIDTH] IN BLOOD BY AUTOMATED COUNT: 16.2 % (ref 11.6–15.1)
GFR SERPL CREATININE-BSD FRML MDRD: 135 ML/MIN/1.73SQ M
GLUCOSE SERPL-MCNC: 115 MG/DL (ref 65–140)
GLUCOSE SERPL-MCNC: 123 MG/DL (ref 65–140)
GLUCOSE SERPL-MCNC: 128 MG/DL (ref 65–140)
GLUCOSE SERPL-MCNC: 92 MG/DL (ref 65–140)
GLUCOSE SERPL-MCNC: 93 MG/DL (ref 65–140)
HCT VFR BLD AUTO: 32.2 % (ref 34.8–46.1)
HGB BLD-MCNC: 9.7 G/DL (ref 11.5–15.4)
LYMPHOCYTES # BLD AUTO: 1.39 THOUSANDS/ΜL (ref 0.6–4.47)
LYMPHOCYTES NFR BLD AUTO: 26 % (ref 14–44)
MCH RBC QN AUTO: 26.8 PG (ref 26.8–34.3)
MCHC RBC AUTO-ENTMCNC: 30.1 G/DL (ref 31.4–37.4)
MCV RBC AUTO: 89 FL (ref 82–98)
MONOCYTES # BLD AUTO: 0.57 THOUSAND/ΜL (ref 0.17–1.22)
MONOCYTES NFR BLD AUTO: 11 % (ref 4–12)
NEUTROPHILS # BLD AUTO: 3.19 THOUSANDS/ΜL (ref 1.85–7.62)
NEUTS SEG NFR BLD AUTO: 59 % (ref 43–75)
NRBC BLD AUTO-RTO: 0 /100 WBCS
PLATELET # BLD AUTO: 255 THOUSANDS/UL (ref 149–390)
PMV BLD AUTO: 9.8 FL (ref 8.9–12.7)
POTASSIUM SERPL-SCNC: 4 MMOL/L (ref 3.5–5.3)
RBC # BLD AUTO: 3.62 MILLION/UL (ref 3.81–5.12)
SODIUM SERPL-SCNC: 141 MMOL/L (ref 136–145)
WBC # BLD AUTO: 5.33 THOUSAND/UL (ref 4.31–10.16)

## 2018-04-06 PROCEDURE — 97535 SELF CARE MNGMENT TRAINING: CPT

## 2018-04-06 PROCEDURE — 85025 COMPLETE CBC W/AUTO DIFF WBC: CPT | Performed by: SURGERY

## 2018-04-06 PROCEDURE — 99231 SBSQ HOSP IP/OBS SF/LOW 25: CPT | Performed by: THORACIC SURGERY (CARDIOTHORACIC VASCULAR SURGERY)

## 2018-04-06 PROCEDURE — 80048 BASIC METABOLIC PNL TOTAL CA: CPT | Performed by: SURGERY

## 2018-04-06 PROCEDURE — 82948 REAGENT STRIP/BLOOD GLUCOSE: CPT

## 2018-04-06 PROCEDURE — C9113 INJ PANTOPRAZOLE SODIUM, VIA: HCPCS | Performed by: SURGERY

## 2018-04-06 RX ORDER — OXYCODONE HCL 5 MG/5 ML
10 SOLUTION, ORAL ORAL EVERY 4 HOURS PRN
Status: DISCONTINUED | OUTPATIENT
Start: 2018-04-06 | End: 2018-04-11

## 2018-04-06 RX ORDER — OXYCODONE HCL 5 MG/5 ML
5 SOLUTION, ORAL ORAL EVERY 4 HOURS PRN
Status: DISCONTINUED | OUTPATIENT
Start: 2018-04-06 | End: 2018-04-11

## 2018-04-06 RX ADMIN — ONDANSETRON 4 MG: 2 INJECTION INTRAMUSCULAR; INTRAVENOUS at 06:34

## 2018-04-06 RX ADMIN — CALCIUM GLUCONATE: 94 INJECTION, SOLUTION INTRAVENOUS at 21:46

## 2018-04-06 RX ADMIN — OXYCODONE HYDROCHLORIDE 10 MG: 5 SOLUTION ORAL at 20:33

## 2018-04-06 RX ADMIN — HEPARIN SODIUM 5000 UNITS: 5000 INJECTION, SOLUTION INTRAVENOUS; SUBCUTANEOUS at 21:52

## 2018-04-06 RX ADMIN — OXYCODONE HYDROCHLORIDE 10 MG: 5 SOLUTION ORAL at 11:13

## 2018-04-06 RX ADMIN — HEPARIN SODIUM 5000 UNITS: 5000 INJECTION, SOLUTION INTRAVENOUS; SUBCUTANEOUS at 06:32

## 2018-04-06 RX ADMIN — LORAZEPAM 0.5 MG: 2 INJECTION INTRAMUSCULAR; INTRAVENOUS at 06:35

## 2018-04-06 RX ADMIN — ALTEPLASE 2 MG: 2.2 INJECTION, POWDER, LYOPHILIZED, FOR SOLUTION INTRAVENOUS at 09:49

## 2018-04-06 RX ADMIN — PANTOPRAZOLE SODIUM 40 MG: 40 INJECTION, POWDER, FOR SOLUTION INTRAVENOUS at 09:49

## 2018-04-06 RX ADMIN — HYDROMORPHONE HYDROCHLORIDE 1 MG: 1 INJECTION, SOLUTION INTRAMUSCULAR; INTRAVENOUS; SUBCUTANEOUS at 06:36

## 2018-04-06 RX ADMIN — LORAZEPAM 0.5 MG: 2 INJECTION INTRAMUSCULAR; INTRAVENOUS at 20:41

## 2018-04-06 RX ADMIN — ONDANSETRON 4 MG: 2 INJECTION INTRAMUSCULAR; INTRAVENOUS at 18:21

## 2018-04-06 RX ADMIN — HEPARIN SODIUM 5000 UNITS: 5000 INJECTION, SOLUTION INTRAVENOUS; SUBCUTANEOUS at 15:23

## 2018-04-06 NOTE — PLAN OF CARE
Problem: OCCUPATIONAL THERAPY ADULT  Goal: Performs self-care activities at highest level of function for planned discharge setting  See evaluation for individualized goals  Treatment Interventions: ADL retraining, Functional transfer training, UE strengthening/ROM, Endurance training, Cognitive reorientation, Patient/family training, Equipment evaluation/education, Compensatory technique education, Continued evaluation, Energy conservation, Activityengagement          See flowsheet documentation for full assessment, interventions and recommendations  Outcome: Progressing  Limitation: Decreased ADL status, Decreased UE ROM, Decreased UE strength, Decreased Safe judgement during ADL, Decreased cognition, Decreased endurance, Decreased self-care trans, Decreased high-level ADLs  Prognosis: Fair  Assessment: Pt seen for OT treatment session focused on family education during escalation meeting conducted w/ pt's medical treatment team and pt's mother and daughter  Family educated on Ot's role w/ pt and continued progress and plan of care up to this point  Discussed w/ team discharge recommendations for pt to this point- will pend on pt's family's availability to provide support at home, otherwise STR is still an option if pt is agreeable  Pt has to manage tube feedings, drains, and nasogastric tube once d/c'd  During family meeting, pt's mother and daughter were present and their questions and concerns were addressed  Pt had to use bathroom during meeting therefore session also addressed toileting, transfers and functional mobility w/i the room  Pt and pt's family informed that pt has declined therapy several times and recommended continued cognitive evaluation to ID coping skills to manage depression and other emotional instabilities at this time  Will continue to see pt and follow recommended plan of care   Recommend pt d/c home w/ Home OT and increased family support- however pending support available at home, STR is still an option  Will continue to talk w/ team and family regarding d/c plans     Recommendation: Other (comment) (Neuropsych for psychotherapy and coping skills for depressio)  OT Discharge Recommendation: Home OT (vs  STR pending progress and home support)  OT - OK to Discharge: Yes (when medically stable)      Comments: Diann Gonzalez MOT, OTR/L

## 2018-04-06 NOTE — OCCUPATIONAL THERAPY NOTE
OT CANCEL NOTE    Chart reviewed  Attempted to see pt this AM two separate times  Initially attempted with pt around 8:30 AM and pt was sleeping in bed  Despite repeated attempts for arousal pt continued to sleep in bed  Re-attempted to see pt later this morning and pt reported she wanted to be left alone  Despite encouragement to participate and to reposition pt in bed, pt still unwilling to engage in therapy at this time  Will continue to follow pt on caseload and see as clinically able       Diann MIRANDA, OTR/L

## 2018-04-06 NOTE — PLAN OF CARE
Problem: PHYSICAL THERAPY ADULT  Goal: Performs mobility at highest level of function for planned discharge setting  See evaluation for individualized goals  Treatment/Interventions: Functional transfer training, LE strengthening/ROM, Elevations, Therapeutic exercise, Endurance training, Bed mobility, Gait training, Equipment eval/education, Spoke to nursing, Spoke to case management, OT  Equipment Recommended:  (TBD)       See flowsheet documentation for full assessment, interventions and recommendations  Outcome: Progressing  Prognosis: Good  Problem List: Decreased strength, Decreased endurance, Decreased mobility, Pain  Assessment: Limited functional mobility training performed in the room during team escalation meeting; pt required close (S)/guarding for OOB mobility mainly due to general somnolence and overall fatigue; pt cont to demonstrate general weakness and deconditioning likely due to overall status and inconsistent mobilization routine (inconsistent participation); D/C recommendations (home w/ available support and home PT follow up vs rehab) cont to depend on clinical course/med needs, overall progress w/ mobility skills and endurance and level of support available vs required at home; family was also requested to notify the team (CM in particular) should pt return into the home environment that is different from pt's own home in terms of set-up/steps; the team and family are aware; will follow  Barriers to Discharge:  (few KAVITA)     Recommendation: Other (Comment) (see above assessment)          See flowsheet documentation for full assessment

## 2018-04-06 NOTE — PROGRESS NOTES
Progress Note - Thoracic Surgery   Sveta Sarmiento 36 y o  female MRN: [id number]  Unit/Bed#: [facility] 427-01 Encounter: [id number]    Assessment:  35 yo F with proximal gastric perforation following hiatal hernia repair, s/p multiple surgical interventions including esophageal stent with removal, now s/p EGD with postpyloric keofed placement, botox injection, antral bx 4/5    Plan:  - advance TF as tolerated  - monitor fevers --> if WBC increase, poss CTAP  - poss d/c L flank drain for decreased output  - continue midline drain to sxn  - zofran PRN for nausea  - DVT ppx    Subjective/Objective     Subjective: Patient reports nausea  TF currently at 20  Objective:     Vitals: Blood pressure 102/55, pulse 83, temperature 98 °F (36 7 °C), temperature source Oral, resp  rate 18, height 5' 4" (1 626 m), weight 55 7 kg (122 lb 12 7 oz), SpO2 96 %, not currently breastfeeding  ,Body mass index is 21 08 kg/m²  I/O       04/04 0701 - 04/05 0700 04/05 0701 - 04/06 0700    P  O   0    I V  (mL/kg)  900 (16 2)    NG/GT  20    TPN 1054 8 3180 2    Feedings  20    Total Intake(mL/kg) 1054 8 (18 9) 4120 2 (74)    Urine (mL/kg/hr) 2525 (1 9) 1800 (1 3)    Emesis/NG output  0 (0)    Drains 60 (0) 120 (0 1)    Total Output 2585 1920    Net -1530 2 +2200 2                Physical Exam:  GEN: NAD  HEENT: MMM  CV: RRR  Lung: Normal effort  Ab: Soft, NT/ND, ADENIKE x2 oily brown  Extrem: No CCE  Neuro:  A+Ox3    Lab, Imaging and other studies: CBC with diff: No results found for: WBC, HGB, HCT, MCV, PLT, ADJUSTEDWBC, MCH, MCHC, RDW, MPV, NRBC, BMP/CMP: No results found for: NA, K, CL, CO2, ANIONGAP, BUN, CREATININE, GLUCOSE, CALCIUM, AST, ALT, ALKPHOS, PROT, ALBUMIN, BILITOT, EGFR, Magnesium: No results found for: MAG  VTE Pharmacologic Prophylaxis: Heparin  VTE Mechanical Prophylaxis: sequential compression device
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
## 2018-04-06 NOTE — PHYSICAL THERAPY NOTE
PHYSICAL THERAPY NOTE          Patient Name: Mello Montalvo  NZYIJ'R Date: 4/6/2018 04/06/18 1351   Pain Assessment   Pain Assessment FLACC   Pain Type Acute pain;Surgical pain   Pain Location Back   Pain Orientation Left   Pain Descriptors Aching;Discomfort   Pain Frequency Intermittent   Pain Onset Ongoing   Clinical Progression Not changed   Effect of Pain on Daily Activities guarding w/ mobility   Patient's Stated Pain Goal No pain   Hospital Pain Intervention(s) Medication (See MAR); Repositioned; Ambulation/increased activity; Distraction; Emotional support  (Kinjal Oliveira aware)   Response to Interventions resting post getting back to bed   Pain Rating: FLACC (Rest) - Face 0   Pain Rating: FLACC (Rest) - Legs 0   Pain Rating: FLACC (Rest) - Activity 0   Pain Rating: FLACC (Rest) - Cry 0   Pain Rating: FLACC (Rest) - Consolability 0   Score: FLACC (Rest) 0   Pain Rating: FLACC (Activity) - Face 1   Pain Rating: FLACC (Activity) - Legs 0   Pain Rating: FLACC (Activity) - Activity 0   Pain Rating: FLACC (Activity) - Cry 1   Pain Rating: FLACC (Activity) - Consolability 0   Score: FLACC (Activity) 2   Restrictions/Precautions   Other Precautions Cognitive;Multiple lines; Fall Risk;Pain   General   Chart Reviewed Yes   Additional Pertinent History Escalated team was held this PM in pt's room and included Dr Kinjal Jain (thoracic surgery), Dr Jaden Salinas (GI), Caity Gonzalez (OTR), Donavon Roldan (CSWS), Kayley Tobin (mother) and Tia Costello (daughter)   Pt was present during the session but remained somnolent and appeared to be sleeping during majority of the session; at some point during the meeting, pt became more alert and indicated she needed to go to the BR; (A) was provided for mobilization at that point to and from the BR; additional input on pt's recent mobility status and D/C options from PT/mobility stand point were also provided to family and the team during the session; Response to Previous Treatment Patient with no complaints from previous session  Family/Caregiver Present Yes  (mother and dtr)   Cognition   Overall Cognitive Status WFL   Arousal/Participation Lethargic   Attention Attends with cues to redirect   Orientation Level Oriented to person;Oriented to place;Oriented to situation   Memory Unable to assess   Following Commands Follows one step commands without difficulty   Subjective   Subjective Pt is initially resting in bed during the session; later during the session, pt became more alert and wished to go to the BR; flat affect; limited responses to questions   Bed Mobility   Supine to Sit 4  Minimal assistance   Additional items Assist x 1;HOB elevated; Increased time required;Verbal cues   Sit to Supine 4  Minimal assistance   Additional items Assist x 1;Verbal cues   Transfers   Sit to Stand 5  Supervision   Additional items Assist x 1;Verbal cues   Stand to Sit 5  Supervision   Additional items Assist x 1;Verbal cues   Ambulation/Elevation   Gait pattern Excessively slow; Inconsistent bree   Gait Assistance 5  Supervision   Additional items Assist x 1;Verbal cues; Tactile cues   Assistive Device None   Distance 2 x 8 ft in the room; further amb was not performed due to team meeting in progress   Balance   Static Sitting Fair +   Static Standing Kenia Hou [id number] -   Activity Tolerance   Activity Tolerance Patient limited by fatigue   Nurse Made Aware spoke to Temi Cifuentes RN   Assessment   Prognosis Good   Problem List Decreased strength;Decreased endurance;Decreased mobility;Pain   Assessment Limited functional mobility training performed in the room during team escalation meeting; pt required close (S)/guarding for OOB mobility mainly due to general somnolence and overall fatigue; pt cont to demonstrate general weakness and deconditioning likely due to overall status and inconsistent mobilization routine (inconsistent participation); D/C recommendations (home w/ available support and home PT follow up vs rehab) cont to depend on clinical course/med needs, overall progress w/ mobility skills and endurance and level of support available vs required at home; family was also requested to notify the team (CM in particular) should pt return into the home environment that is different from pt's own home in terms of set-up/steps; the team and family are aware; will follow  Barriers to Discharge (few KAVITA)   Goals   Patient Goals none expressed   LTG Expiration Date 04/11/18   Treatment Day 4   Plan   Treatment/Interventions Functional transfer training;LE strengthening/ROM; Elevations; Therapeutic exercise; Endurance training;Bed mobility;Gait training;Spoke to nursing;Spoke to MD;Spoke to case management; Family;OT   Progress Slow progress, multiple refusals   PT Frequency 5x/wk   Recommendation   Recommendation Other (Comment)  (see above assessment)   Equipment Recommended (none at this time)     Radha Flanagan, PT

## 2018-04-06 NOTE — CASE MANAGEMENT
Continued Stay Review    Date: 4/6/2018    Vital Signs: /64 (BP Location: Right arm)   Pulse 85   Temp 98 4 °F (36 9 °C) (Oral)   Resp 20   Ht 5' 4" (1 626 m)   Wt 55 7 kg (122 lb 12 7 oz)   LMP  (LMP Unknown)   SpO2 94%   BMI 21 08 kg/m²     Medications:   Scheduled Meds:   Current Facility-Administered Medications:  Adult TPN (CUSTOM BASE/STANDARD ELECTROLYTE)  Intravenous Continuous Wendy Hare MD Last Rate: 67 4 mL/hr at 04/05/18 2130   heparin (porcine) 5,000 Units Subcutaneous [facility] Debi Lara MD    insulin lispro 1-5 Units Subcutaneous Q6H [address] Maria Luz Mathur PA-C    LORazepam 0 5 mg Intravenous Q6H PRN Ashley Fam MD    metoclopramide 10 mg Intravenous Q6H PRN Mora Escalante MD    ondansetron 4 mg Intravenous Q6H PRN Debi Lara MD    oxyCODONE 10 mg Per NG Tube Q4H PRN Cleve Martin PA-C    oxyCODONE 5 mg Oral Q4H PRN Cleve Martin PA-C    pantoprazole 40 mg Intravenous Q24H Jose Antonio Caballero MD      Continuous Infusions:   Adult TPN (CUSTOM BASE/STANDARD ELECTROLYTE)  Last Rate: 67 4 mL/hr at 04/05/18 2130     PRN Meds: LORazepam    metoclopramide    ondansetron    oxyCODONE    oxyCODONE    Abnormal Labs/Diagnostic Results: hgb 9 7/32 2  Cl 109--gap 2--cr 0 34bs     Age/Sex: 36 y o  female     Assessment/Plan: Stable overnight, tube feeds begun  T-max 100 6°, vital signs are stable  Abdomen is soft nontender  20 cc from midline wound manager  140 cc from close suction drain     Impression:  Proximal gastric perforation  Status post stent removal  Status post pyloric Botox and nasal jejunal tube feed placement     Recommendation:  Advance tube feeds to goal  Will stop TPN after tube feeds are advanced  Follow drain output  Discharge planning, hopefully home with VNA and home tube feeds  If gastric perforation does not heal spontaneously she will need a repeat laparotomy with possible esophagojejunostomy    This would best be undertaken in another month or 2 when abdominal inflammation and had adequate time to recede  Family meeting today to reiterate these plans       egd on 4/5--IMPRESSIONS: Normal esophagus  Evidence of known gastric perforation   found  Large area in the antrum of the stomach with scar tissue,   erythematous, and edematous areas  Biopsied  Successful botox injection   to the antrum  Normal 1st portion of the duodenum, 2nd portion of the   duodenum, and 3rd portion of the duodenum  RECOMMENDATIONS: Can use [employer] for feedings    Discussed with surgical   team   Discharge Plan: home with family and [facility]

## 2018-04-06 NOTE — SOCIAL WORK
Escalated team meeting held today with the following individuals present: Dr Bev Rider (thoracic surgery), Dr Jennie Morocho (GI), Mic Morfin (PT), Jesus Gonzalez (OT), Earl Parker (CSWS), Divya Jacobs (mother) and Rafi Ruano (daughter)  Patient was present during the entire meeting however for the majority of the meeting appeared to be sleeping  Patient's family was provided with a medical update including past and present medical treatments and interventions  Discussed the attempts to place stents which were not successful and now these stents have been removed  Discussed the plan to transition completely to tube feeds from TPN  Patient will likely need additional surgical intervention in a few months  Patient was awake for the end of the meeting, discussed importance of nutrition and need to wean off IV Pain medications  Discussed concerns about depression and consult to psychiatry which patient declined previously  Therapy reports their recommendations are rehab versus home with Riki Warren at this time depending on caregiver support  Family will review this separately with patient to discuss rehab options vs home options if there is adequate family support in the home  CM will follow up to make the appropriate referrals  Update provided to CONNIE Omalley and RN MIRI Juarez about the above information

## 2018-04-06 NOTE — PHYSICAL THERAPY NOTE
Attempted to see pt x 2 in AM; initially, pt is observed resting in bed and not arousable to verbal or tactile stim; later in AM, pt is observed in bed/awake; reports she does not wish to mobilize and just wants to stay in bed at that time; reports being OK where she was and does not wish to reposition (noted to be low in bed); will follow      Zoey Richards, PT

## 2018-04-07 LAB
GLUCOSE SERPL-MCNC: 109 MG/DL (ref 65–140)
GLUCOSE SERPL-MCNC: 120 MG/DL (ref 65–140)
GLUCOSE SERPL-MCNC: 120 MG/DL (ref 65–140)
GLUCOSE SERPL-MCNC: 141 MG/DL (ref 65–140)

## 2018-04-07 PROCEDURE — 99231 SBSQ HOSP IP/OBS SF/LOW 25: CPT | Performed by: THORACIC SURGERY (CARDIOTHORACIC VASCULAR SURGERY)

## 2018-04-07 PROCEDURE — C9113 INJ PANTOPRAZOLE SODIUM, VIA: HCPCS | Performed by: SURGERY

## 2018-04-07 PROCEDURE — 82948 REAGENT STRIP/BLOOD GLUCOSE: CPT

## 2018-04-07 PROCEDURE — 99232 SBSQ HOSP IP/OBS MODERATE 35: CPT | Performed by: INTERNAL MEDICINE

## 2018-04-07 RX ORDER — ACETAMINOPHEN 160 MG/5ML
650 SUSPENSION, ORAL (FINAL DOSE FORM) ORAL EVERY 8 HOURS PRN
Status: DISCONTINUED | OUTPATIENT
Start: 2018-04-07 | End: 2018-04-11

## 2018-04-07 RX ADMIN — PANTOPRAZOLE SODIUM 40 MG: 40 INJECTION, POWDER, FOR SOLUTION INTRAVENOUS at 09:07

## 2018-04-07 RX ADMIN — LORAZEPAM 0.5 MG: 2 INJECTION INTRAMUSCULAR; INTRAVENOUS at 06:22

## 2018-04-07 RX ADMIN — ONDANSETRON 4 MG: 2 INJECTION INTRAMUSCULAR; INTRAVENOUS at 01:33

## 2018-04-07 RX ADMIN — OXYCODONE HYDROCHLORIDE 10 MG: 5 SOLUTION ORAL at 01:38

## 2018-04-07 RX ADMIN — OXYCODONE HYDROCHLORIDE 10 MG: 5 SOLUTION ORAL at 15:03

## 2018-04-07 RX ADMIN — HEPARIN SODIUM 5000 UNITS: 5000 INJECTION, SOLUTION INTRAVENOUS; SUBCUTANEOUS at 22:10

## 2018-04-07 RX ADMIN — ONDANSETRON 4 MG: 2 INJECTION INTRAMUSCULAR; INTRAVENOUS at 09:48

## 2018-04-07 RX ADMIN — ONDANSETRON 4 MG: 2 INJECTION INTRAMUSCULAR; INTRAVENOUS at 20:27

## 2018-04-07 RX ADMIN — HEPARIN SODIUM 5000 UNITS: 5000 INJECTION, SOLUTION INTRAVENOUS; SUBCUTANEOUS at 06:16

## 2018-04-07 RX ADMIN — OXYCODONE HYDROCHLORIDE 10 MG: 5 SOLUTION ORAL at 09:07

## 2018-04-07 RX ADMIN — OXYCODONE HYDROCHLORIDE 10 MG: 5 SOLUTION ORAL at 20:31

## 2018-04-07 RX ADMIN — HEPARIN SODIUM 5000 UNITS: 5000 INJECTION, SOLUTION INTRAVENOUS; SUBCUTANEOUS at 15:02

## 2018-04-07 RX ADMIN — LORAZEPAM 0.5 MG: 2 INJECTION INTRAMUSCULAR; INTRAVENOUS at 20:39

## 2018-04-07 NOTE — PROGRESS NOTES
Progress Note - Thoracic Surgery   Juan Byrnes 36 y o  female MRN: [id number]  Unit/Bed#: [facility] 427-01 Encounter: [id number]    Assessment:  37 yo F with proximal gastric perforation following hiatal hernia repair, s/p multiple surgical interventions including esophageal stent with removal, now s/p EGD with postpyloric keofed placement, botox injection, antral bx 4/5    Plan:  - TF now at goal  - monitor fevers  - maintain drains to sxn  - allow TPN to run out  - dispo planning    Subjective/Objective     Subjective: Patient denies pain and nausea  No flatus or BM  Objective:     Vitals: Blood pressure 108/58, pulse 103, temperature 100 4 °F (38 °C), temperature source Oral, resp  rate 18, height 5' 4" (1 626 m), weight 55 7 kg (122 lb 12 7 oz), SpO2 95 %, not currently breastfeeding  ,Body mass index is 21 08 kg/m²  I/O       04/05 0701 - 04/06 0700 04/06 0701 - 04/07 0700 04/07 0701 - 04/08 0700    P  O  0      I V  (mL/kg) 900 (16 2)      NG/GT 20      TPN 3180 2 2890 9     Feedings 60 830     Total Intake(mL/kg) 4160 2 (74 7) 3720 9 (66 8)     Urine (mL/kg/hr) 2200 (1 6) 2500 (1 9)     Emesis/NG output 0 (0)      Drains 160 (0 1) 100 (0 1)     Total Output 2360 2600      Net +1800 2 +1120 9                   Physical Exam:  GEN: NAD  HEENT: MMM  CV: RRR  Lung: Normal effort  Ab: Soft, NT/ND, ADENIKE serous from midline wound  Extrem: No CCE  Neuro:  A+Ox3    Lab, Imaging and other studies:   CBC with diff:   Lab Results   Component Value Date    WBC 5 33 04/06/2018    HGB 9 7 (L) 04/06/2018    HCT 32 2 (L) 04/06/2018    MCV 89 04/06/2018     04/06/2018    MCH 26 8 04/06/2018    MCHC 30 1 (L) 04/06/2018    RDW 16 2 (H) 04/06/2018    MPV 9 8 04/06/2018    NRBC 0 04/06/2018   , BMP/CMP:   Lab Results   Component Value Date     04/06/2018    K 4 0 04/06/2018     (H) 04/06/2018    CO2 30 04/06/2018    ANIONGAP 2 (L) 04/06/2018    BUN 19 04/06/2018    CREATININE 0 34 (L) 04/06/2018    GLUCOSE 92 04/06/2018    CALCIUM 8 5 04/06/2018    EGFR 135 04/06/2018   , Magnesium: No results found for: MAG  VTE Pharmacologic Prophylaxis: Heparin  VTE Mechanical Prophylaxis: sequential compression device

## 2018-04-07 NOTE — PROGRESS NOTES
Progress Note - John Marr 36 y o  female MRN: [id number]    Unit/Bed#: [facility] 427-01 Encounter: [id number]        Subjective:     No acute events  Continues with abdominal pain  She is ambulating  She is passing flatus  Objective:     Vitals: Blood pressure 96/53, pulse 98, temperature 100 2 °F (37 9 °C), temperature source Oral, resp  rate 18, height 5' 4" (1 626 m), weight 55 7 kg (122 lb 12 7 oz), SpO2 95 %, not currently breastfeeding  ,Body mass index is 21 08 kg/m²  Intake/Output Summary (Last 24 hours) at 04/07/18 1948  Last data filed at 04/07/18 1801   Gross per 24 hour   Intake          2971 36 ml   Output             2495 ml   Net           476 36 ml       Physical Exam:     General Appearance: Alert, appears stated age and cooperative  Chronically ill appearing  Lungs: Clear to auscultation bilaterally, no rales or rhonchi, no labored breathing/accessory muscle use  Heart: Regular rate and rhythm, S1, S2 normal, no murmur, click, rub or gallop  Abdomen: Mid abdominal drain bag is empty, ADENIKE drains with minimal drainage of bilous content  Abdomen mildly tender     Extremities: No cyanosis, edema    Invasive Devices     Peripherally Inserted Central Catheter Line            PICC Line 63/63/66 Left Basilic 12 days          Drain            Closed/Suction Drain Right Abdomen Other (Comment) -- days    Closed/Suction Drain Midline;Superior Abdomen Other (Comment) 10 Fr  49 days    NG/OG/Enteral Tube Nasogastric 8 Fr Left nares 43 days    Open Drain Midline Abdomen 33 days    Closed/Suction Drain Left;Lateral LUQ Bulb 12 Fr  32 days    NG/OG/Enteral Tube Enteral Feeding Tube 8 Fr Right nares 2 days                Lab Results:      Results from last 7 days  Lab Units 04/06/18  1040   WBC Thousand/uL 5 33   HEMOGLOBIN g/dL 9 7*   HEMATOCRIT % 32 2*   PLATELETS Thousands/uL 255   NEUTROS PCT % 59   LYMPHS PCT % 26   MONOS PCT % 11   EOS PCT % 3       Results from last 7 days  Lab Units 04/06/18  1039 SODIUM mmol/L 141   POTASSIUM mmol/L 4 0   CHLORIDE mmol/L 109*   CO2 mmol/L 30   BUN mg/dL 19   CREATININE mg/dL 0 34*   CALCIUM mg/dL 8 5   GLUCOSE RANDOM mg/dL 92               Imaging Studies: I have personally reviewed pertinent imaging studies  Ct Chest Abdomen Pelvis W Contrast    Result Date: 3/23/2018  Impression: Esophageal stent remains in place  Persistent but smaller anterior gastric leak now with a fistulous tract extending to the anterior mid skin surface  A percutaneous drainage catheter is present with in the collection at the site of leak  Small left basilar pleural effusion  Workstation performed: [id number]     Ir Picc Line    Result Date: 3/26/2018  Impression: 1  Status post placement of a 5-Malawian double-lumen central venous catheter via the left basilic vein with its tip at the cavoatrial junction under ultrasound and fluoroscopic guidance  Workstation performed: [id number]     Ir Other Tube Change    Result Date: 3/26/2018  Impression: Impression: For scopic evaluation and repositioning of indwelling midline 10-Malawian drain more superficially  Workstation performed: [id number]       ASSESMENT/ PLAN:     1  Gastric perforation :   - status post Botox injection  She is doing well from that standpoint  We will have to give time for the area of the perforation to he will more and the inflammation to subside allowing prior to any further interventions  She is on tube feeds and will continue these for now  The TPN will be discontinued  Further planning will be done with Dr Ishan Fleming and Dr Meyer Signs teams  Please call with questions

## 2018-04-08 LAB
ANION GAP SERPL CALCULATED.3IONS-SCNC: 4 MMOL/L (ref 4–13)
BASOPHILS # BLD AUTO: 0.03 THOUSANDS/ΜL (ref 0–0.1)
BASOPHILS NFR BLD AUTO: 1 % (ref 0–1)
BUN SERPL-MCNC: 15 MG/DL (ref 5–25)
CALCIUM SERPL-MCNC: 8.5 MG/DL (ref 8.3–10.1)
CHLORIDE SERPL-SCNC: 103 MMOL/L (ref 100–108)
CO2 SERPL-SCNC: 31 MMOL/L (ref 21–32)
CREAT SERPL-MCNC: 0.3 MG/DL (ref 0.6–1.3)
EOSINOPHIL # BLD AUTO: 0.16 THOUSAND/ΜL (ref 0–0.61)
EOSINOPHIL NFR BLD AUTO: 3 % (ref 0–6)
ERYTHROCYTE [DISTWIDTH] IN BLOOD BY AUTOMATED COUNT: 16.8 % (ref 11.6–15.1)
GFR SERPL CREATININE-BSD FRML MDRD: 141 ML/MIN/1.73SQ M
GLUCOSE SERPL-MCNC: 118 MG/DL (ref 65–140)
GLUCOSE SERPL-MCNC: 74 MG/DL (ref 65–140)
GLUCOSE SERPL-MCNC: 76 MG/DL (ref 65–140)
GLUCOSE SERPL-MCNC: 83 MG/DL (ref 65–140)
GLUCOSE SERPL-MCNC: 88 MG/DL (ref 65–140)
GLUCOSE SERPL-MCNC: 90 MG/DL (ref 65–140)
HCT VFR BLD AUTO: 29.8 % (ref 34.8–46.1)
HGB BLD-MCNC: 8.9 G/DL (ref 11.5–15.4)
LYMPHOCYTES # BLD AUTO: 1.59 THOUSANDS/ΜL (ref 0.6–4.47)
LYMPHOCYTES NFR BLD AUTO: 32 % (ref 14–44)
MCH RBC QN AUTO: 27 PG (ref 26.8–34.3)
MCHC RBC AUTO-ENTMCNC: 29.9 G/DL (ref 31.4–37.4)
MCV RBC AUTO: 90 FL (ref 82–98)
MONOCYTES # BLD AUTO: 0.48 THOUSAND/ΜL (ref 0.17–1.22)
MONOCYTES NFR BLD AUTO: 10 % (ref 4–12)
NEUTROPHILS # BLD AUTO: 2.69 THOUSANDS/ΜL (ref 1.85–7.62)
NEUTS SEG NFR BLD AUTO: 54 % (ref 43–75)
NRBC BLD AUTO-RTO: 1 /100 WBCS
PLATELET # BLD AUTO: 215 THOUSANDS/UL (ref 149–390)
PMV BLD AUTO: 10.1 FL (ref 8.9–12.7)
POTASSIUM SERPL-SCNC: 4 MMOL/L (ref 3.5–5.3)
RBC # BLD AUTO: 3.3 MILLION/UL (ref 3.81–5.12)
SODIUM SERPL-SCNC: 138 MMOL/L (ref 136–145)
WBC # BLD AUTO: 4.97 THOUSAND/UL (ref 4.31–10.16)

## 2018-04-08 PROCEDURE — C9113 INJ PANTOPRAZOLE SODIUM, VIA: HCPCS | Performed by: SURGERY

## 2018-04-08 PROCEDURE — 82948 REAGENT STRIP/BLOOD GLUCOSE: CPT

## 2018-04-08 PROCEDURE — 85025 COMPLETE CBC W/AUTO DIFF WBC: CPT | Performed by: STUDENT IN AN ORGANIZED HEALTH CARE EDUCATION/TRAINING PROGRAM

## 2018-04-08 PROCEDURE — 80048 BASIC METABOLIC PNL TOTAL CA: CPT | Performed by: STUDENT IN AN ORGANIZED HEALTH CARE EDUCATION/TRAINING PROGRAM

## 2018-04-08 RX ORDER — BISACODYL 10 MG
10 SUPPOSITORY, RECTAL RECTAL DAILY PRN
Status: DISCONTINUED | OUTPATIENT
Start: 2018-04-08 | End: 2018-04-13 | Stop reason: HOSPADM

## 2018-04-08 RX ORDER — ACETAMINOPHEN 650 MG/1
650 SUPPOSITORY RECTAL ONCE AS NEEDED
Status: DISCONTINUED | OUTPATIENT
Start: 2018-04-08 | End: 2018-04-13 | Stop reason: HOSPADM

## 2018-04-08 RX ADMIN — PANCRELIPASE 24000 UNITS: 24000; 76000; 120000 CAPSULE, DELAYED RELEASE PELLETS ORAL at 15:19

## 2018-04-08 RX ADMIN — HYDROMORPHONE HYDROCHLORIDE 0.5 MG: 1 INJECTION, SOLUTION INTRAMUSCULAR; INTRAVENOUS; SUBCUTANEOUS at 10:39

## 2018-04-08 RX ADMIN — ONDANSETRON 4 MG: 2 INJECTION INTRAMUSCULAR; INTRAVENOUS at 05:28

## 2018-04-08 RX ADMIN — PANTOPRAZOLE SODIUM 40 MG: 40 INJECTION, POWDER, FOR SOLUTION INTRAVENOUS at 10:43

## 2018-04-08 RX ADMIN — HEPARIN SODIUM 5000 UNITS: 5000 INJECTION, SOLUTION INTRAVENOUS; SUBCUTANEOUS at 15:40

## 2018-04-08 RX ADMIN — HEPARIN SODIUM 5000 UNITS: 5000 INJECTION, SOLUTION INTRAVENOUS; SUBCUTANEOUS at 21:33

## 2018-04-08 RX ADMIN — LORAZEPAM 0.5 MG: 2 INJECTION INTRAMUSCULAR; INTRAVENOUS at 21:39

## 2018-04-08 RX ADMIN — HEPARIN SODIUM 5000 UNITS: 5000 INJECTION, SOLUTION INTRAVENOUS; SUBCUTANEOUS at 05:28

## 2018-04-08 RX ADMIN — LORAZEPAM 0.5 MG: 2 INJECTION INTRAMUSCULAR; INTRAVENOUS at 05:28

## 2018-04-08 RX ADMIN — BISACODYL 10 MG: 10 SUPPOSITORY RECTAL at 10:56

## 2018-04-08 RX ADMIN — HYDROMORPHONE HYDROCHLORIDE 0.5 MG: 1 INJECTION, SOLUTION INTRAMUSCULAR; INTRAVENOUS; SUBCUTANEOUS at 15:40

## 2018-04-08 RX ADMIN — ONDANSETRON 4 MG: 2 INJECTION INTRAMUSCULAR; INTRAVENOUS at 15:41

## 2018-04-08 NOTE — PROGRESS NOTES
Unable to unclog keofed tube after multiple attempts of aspirating and flushing warm soda and creon

## 2018-04-08 NOTE — PROGRESS NOTES
White sx on call, Seda Goldsmith, made aware that keofed tubing unable to flush when trying to give pt tylenol d/t low grade fever  Tried flushing with ginger ale, very warm water with no success  No residual from tube feeding and no warning from feed pump of any clog until a few minutes ago   Per Seda Goldsmith, will stop tube feeding for now and will address issue with GI in am

## 2018-04-08 NOTE — PROGRESS NOTES
Keofed tube remains clogged after multiple attempts to unclog  Attempted to push 1 capsule creon opened in warm water through Chely Files  Tube aspirated and flushed in small amounts multiple times  Able to instill small amount of creon, but with significant resistance  Will allow mixture to sit in keofed and attempt to flush later  Adam Keene   [facility] Surgery  [id number]

## 2018-04-08 NOTE — PROGRESS NOTES
Progress Note - Thoracic Surgery   John Marr 36 y o  female MRN: [id number]  Unit/Bed#: [facility] 427-01 Encounter: [id number]    Assessment:  35 yo F with proximal gastric perforation following hiatal hernia repair, s/p multiple surgical interventions including esophageal stent with removal, now s/p EGD with postpyloric keofed placement, botox injection, antral bx 4/5    Plan:  GI or IR for keofed re-placement  D10 1/2NS until TF restarted  Prn pain control  IS/OOB/ambulate  Wound manger  protonix  SQH/SCDS    Subjective/Objective     Subjective: Keofed clogged overnight, despite trouble shooting keofed would not flush or run TFs around 120am and they were held  Pt hasn't had a BM since admission, +Flatus  Objective:     Vitals: Blood pressure 101/54, pulse 101, temperature (!) 100 7 °F (38 2 °C), temperature source Oral, resp  rate 18, height 5' 4" (1 626 m), weight 55 7 kg (122 lb 12 7 oz), SpO2 95 %, not currently breastfeeding  ,Body mass index is 21 08 kg/m²  I/O       04/05 0701 - 04/06 0700 04/06 0701 - 04/07 0700 04/07 0701 - 04/08 0700    P  O  0      I V  (mL/kg) 900 (16 2)      NG/GT 20      TPN 3180 2 2890 9     Feedings 60 830     Total Intake(mL/kg) 4160 2 (74 7) 3720 9 (66 8)     Urine (mL/kg/hr) 2200 (1 6) 2500 (1 9)     Emesis/NG output 0 (0)      Drains 160 (0 1) 100 (0 1)     Total Output 2360 2600      Net +1800 2 +1120 9                   Physical Exam:  NAD  AAOx3  normal respiratory effort  soft, NT, ND  Midline wound manger with 70 output, JPs 0 output   no c/c/e    Lab, Imaging and other studies:   CBC with diff:   No results found for: WBC, HGB, HCT, MCV, PLT, ADJUSTEDWBC, MCH, MCHC, RDW, MPV, NRBC, BMP/CMP:   No results found for: NA, K, CL, CO2, ANIONGAP, BUN, CREATININE, GLUCOSE, CALCIUM, AST, ALT, ALKPHOS, PROT, ALBUMIN, BILITOT, EGFR, Magnesium: No results found for: MAG  VTE Pharmacologic Prophylaxis: Heparin  VTE Mechanical Prophylaxis: sequential compression device

## 2018-04-09 LAB
GLUCOSE SERPL-MCNC: 65 MG/DL (ref 65–140)
GLUCOSE SERPL-MCNC: 82 MG/DL (ref 65–140)
GLUCOSE SERPL-MCNC: 82 MG/DL (ref 65–140)
GLUCOSE SERPL-MCNC: 93 MG/DL (ref 65–140)

## 2018-04-09 PROCEDURE — C9113 INJ PANTOPRAZOLE SODIUM, VIA: HCPCS | Performed by: SURGERY

## 2018-04-09 PROCEDURE — 97535 SELF CARE MNGMENT TRAINING: CPT

## 2018-04-09 PROCEDURE — 99232 SBSQ HOSP IP/OBS MODERATE 35: CPT | Performed by: PHYSICIAN ASSISTANT

## 2018-04-09 PROCEDURE — 99231 SBSQ HOSP IP/OBS SF/LOW 25: CPT | Performed by: THORACIC SURGERY (CARDIOTHORACIC VASCULAR SURGERY)

## 2018-04-09 PROCEDURE — 82948 REAGENT STRIP/BLOOD GLUCOSE: CPT

## 2018-04-09 PROCEDURE — 99232 SBSQ HOSP IP/OBS MODERATE 35: CPT | Performed by: INTERNAL MEDICINE

## 2018-04-09 RX ORDER — DEXTROSE, SODIUM CHLORIDE, AND POTASSIUM CHLORIDE 5; .45; .15 G/100ML; G/100ML; G/100ML
100 INJECTION INTRAVENOUS CONTINUOUS
Status: DISCONTINUED | OUTPATIENT
Start: 2018-04-09 | End: 2018-04-11

## 2018-04-09 RX ORDER — KETOROLAC TROMETHAMINE 30 MG/ML
15 INJECTION, SOLUTION INTRAMUSCULAR; INTRAVENOUS EVERY 6 HOURS PRN
Status: DISCONTINUED | OUTPATIENT
Start: 2018-04-09 | End: 2018-04-13 | Stop reason: HOSPADM

## 2018-04-09 RX ADMIN — HEPARIN SODIUM 5000 UNITS: 5000 INJECTION, SOLUTION INTRAVENOUS; SUBCUTANEOUS at 22:29

## 2018-04-09 RX ADMIN — HEPARIN SODIUM 5000 UNITS: 5000 INJECTION, SOLUTION INTRAVENOUS; SUBCUTANEOUS at 14:16

## 2018-04-09 RX ADMIN — HEPARIN SODIUM 5000 UNITS: 5000 INJECTION, SOLUTION INTRAVENOUS; SUBCUTANEOUS at 05:13

## 2018-04-09 RX ADMIN — PANTOPRAZOLE SODIUM 40 MG: 40 INJECTION, POWDER, FOR SOLUTION INTRAVENOUS at 09:45

## 2018-04-09 RX ADMIN — DEXTROSE, SODIUM CHLORIDE, AND POTASSIUM CHLORIDE 100 ML/HR: 5; .45; .15 INJECTION INTRAVENOUS at 15:41

## 2018-04-09 RX ADMIN — KETOROLAC TROMETHAMINE 15 MG: 30 INJECTION, SOLUTION INTRAMUSCULAR at 17:46

## 2018-04-09 RX ADMIN — KETOROLAC TROMETHAMINE 15 MG: 30 INJECTION, SOLUTION INTRAMUSCULAR at 11:28

## 2018-04-09 RX ADMIN — LORAZEPAM 0.5 MG: 2 INJECTION INTRAMUSCULAR; INTRAVENOUS at 22:29

## 2018-04-09 RX ADMIN — HYDROMORPHONE HYDROCHLORIDE 0.5 MG: 1 INJECTION, SOLUTION INTRAMUSCULAR; INTRAVENOUS; SUBCUTANEOUS at 03:26

## 2018-04-09 RX ADMIN — DEXTROSE, SODIUM CHLORIDE, AND POTASSIUM CHLORIDE 100 ML/HR: 5; .45; .15 INJECTION INTRAVENOUS at 05:13

## 2018-04-09 NOTE — PROGRESS NOTES
Called by PCA regarding patient's wound manager pouch  Pouch fell off from abdomen while ambulating to the bathroom  Patient refused to have wound manager pouch replaced stating "it hasn't had any drainage from it in days"  Spoke with white surgery resident Enedelia Euceda  Per resident ok to leave wound manager off  Cover incision with a dry dressing  Will continue to monitor

## 2018-04-09 NOTE — PROGRESS NOTES
[facility]  Progress Note - Linda Beyer 36 y o  female MRN: [id number]    Unit/Bed#: [facility] 427-01 Encounter: [id number]    Assessment/Plan:  1  Gastric perforation :    - status post Botox injection      -Dr Esteban Parrish recommends holding off on further endoscopic interventions currently to give the area area of the perforation time to heal more and the inflammation to subside    -She is on tube feeds and will continue these for now  2  NG tube malfunction   -She had an endoscopically placed Kaofed tube which has become blocked  -Flushes with water and soda were attempted but unsucessful  -IR unable to replace Sienna Moreno, plan for EGD for [facility] placement tomorrow  Subjective:   She reports the Kaofed tube has become blocked  She denies any other new complaints today  Objective:     Vitals: Blood pressure 114/67, pulse 70, temperature 98 2 °F (36 8 °C), temperature source Oral, resp  rate 18, height 5' 4" (1 626 m), weight 55 7 kg (122 lb 12 7 oz), SpO2 97 %, not currently breastfeeding  ,Body mass index is 21 08 kg/m²  Intake/Output Summary (Last 24 hours) at 04/09/18 1522  Last data filed at 04/09/18 1412   Gross per 24 hour   Intake           128 33 ml   Output           1887 5 ml   Net         -1759 17 ml       Review of Systems: as per HPI  Review of Systems   Constitutional: Negative for activity change, appetite change, chills, fatigue, fever and unexpected weight change  HENT: Negative for mouth sores, sore throat and trouble swallowing  Respiratory: Negative for shortness of breath  Cardiovascular: Negative for chest pain  Gastrointestinal: Negative for abdominal distention, abdominal pain, blood in stool, constipation, diarrhea, nausea and vomiting  Skin: Negative for color change, pallor, rash and wound  Neurological: Negative for tremors and syncope  All other systems reviewed and are negative        Physical Exam:     Physical Exam   Constitutional: She is oriented to person, place, and time  She appears well-developed and well-nourished  No distress  HENT:   Head: Normocephalic and atraumatic  Kwasi Ruths in place   Eyes: Right eye exhibits no discharge  Left eye exhibits no discharge  No scleral icterus  Neck: Neck supple  No tracheal deviation present  Cardiovascular: Normal rate, regular rhythm, normal heart sounds and intact distal pulses  Exam reveals no gallop and no friction rub  No murmur heard  Pulmonary/Chest: Effort normal and breath sounds normal  No respiratory distress  She has no wheezes  She has no rales  She exhibits no tenderness  Abdominal: Soft  Bowel sounds are normal  She exhibits no distension and no mass  There is no tenderness  There is no rebound and no guarding  Neurological: She is alert and oriented to person, place, and time  Skin: Skin is warm and dry  Psychiatric: She has a normal mood and affect           Invasive Devices     Peripherally Inserted Central Catheter Line            PICC Line 30/65/65 Left Basilic 14 days          Drain            Closed/Suction Drain Right Abdomen Other (Comment) -- days    Closed/Suction Drain Midline;Superior Abdomen Other (Comment) 10 Fr  51 days    NG/OG/Enteral Tube Nasogastric 8 Fr Left nares 44 days    Closed/Suction Drain Left;Lateral LUQ Bulb 12 Fr  33 days    NG/OG/Enteral Tube Enteral Feeding Tube 8 Fr Right nares 4 days

## 2018-04-09 NOTE — PROGRESS NOTES
Patient's blood sugar 65  Patient asymptomatic  D5 1/2 NS just started  Notified white surgery resident Kyle Brooks  No need to give extra dextrose at this time  Will continue to monitor
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
## 2018-04-09 NOTE — PROGRESS NOTES
Progress Note - Thoracic Surgery   Matthias Valente 36 y o  female MRN: [id number]  Unit/Bed#: [facility] 427-01 Encounter: [id number]    Assessment:  35 yo F with proximal gastric perforation following hiatal hernia repair, s/p multiple surgical interventions including esophageal stent with removal, now s/p EGD with postpyloric keofed placement, botox injection, antral bx 4/5    Plan:  - clogged keofed -- will need replacement by GI vs  IR  - hold TF  - OOB, ambulate  - analgesia -- patient reports pain overnight  - DVT ppx -- SQH    Subjective/Objective     Subjective: Patient had BM yesterday  Despite multiple attempts by nursing and surgery, patient's feeding tube remains clogged      Objective:     Vitals: Blood pressure 114/68, pulse 82, temperature 98 °F (36 7 °C), temperature source Oral, resp  rate 18, height 5' 4" (1 626 m), weight 55 7 kg (122 lb 12 7 oz), SpO2 97 %, not currently breastfeeding  ,Body mass index is 21 08 kg/m²  I/O       04/07 0701 - 04/08 0700 04/08 0701 - 04/09 0700    NG/GT 1072 5      6     Feedings 280     Total Intake(mL/kg) 1854 1 (33 3)     Urine (mL/kg/hr) 1700 (1 3) 1100 (0 8)    Emesis/NG output 0 (0)     Drains 80 (0 1)     Total Output 1780 1100    Net +74 1 -1100                Physical Exam:  GEN: NAD  HEENT: MMM  CV: RRR  Lung: Normal effort  Ab: Soft, NT/ND, midline wound C/D/I, ADENIKE x2 scant oily brown  Extrem: No CCE  Neuro:  A+Ox3    Lab, Imaging and other studies:   CBC with diff:   Lab Results   Component Value Date    WBC 4 97 04/08/2018    HGB 8 9 (L) 04/08/2018    HCT 29 8 (L) 04/08/2018    MCV 90 04/08/2018     04/08/2018    MCH 27 0 04/08/2018    MCHC 29 9 (L) 04/08/2018    RDW 16 8 (H) 04/08/2018    MPV 10 1 04/08/2018    NRBC 1 04/08/2018   , BMP/CMP:   Lab Results   Component Value Date     04/08/2018    K 4 0 04/08/2018     04/08/2018    CO2 31 04/08/2018    ANIONGAP 4 04/08/2018    BUN 15 04/08/2018    CREATININE 0 30 (L) 04/08/2018 GLUCOSE 83 04/08/2018    CALCIUM 8 5 04/08/2018    EGFR 141 04/08/2018   , Magnesium: No results found for: MAG  VTE Pharmacologic Prophylaxis: Heparin  VTE Mechanical Prophylaxis: sequential compression device

## 2018-04-09 NOTE — PLAN OF CARE
Problem: OCCUPATIONAL THERAPY ADULT  Goal: Performs self-care activities at highest level of function for planned discharge setting  See evaluation for individualized goals  Treatment Interventions: ADL retraining, Functional transfer training, UE strengthening/ROM, Endurance training, Cognitive reorientation, Patient/family training, Equipment evaluation/education, Compensatory technique education, Continued evaluation, Energy conservation, Activityengagement          See flowsheet documentation for full assessment, interventions and recommendations  Outcome: Progressing  Limitation: Decreased ADL status, Decreased UE ROM, Decreased UE strength, Decreased Safe judgement during ADL, Decreased cognition, Decreased endurance, Decreased self-care trans, Decreased high-level ADLs  Prognosis: Fair  Assessment: Patient participated in Skilled OT session 4/9/18 with interventions consisting of ADL re training with the use of correct body mechnaics, Energy Conservation techniques, safety awareness and fall prevention techniques, therapeutic exercise to: increase functional use of BUEs, increase BUE muscle strength ,  therapeutic activities to: increase activity tolerance, increase standing tolerance time with unilateral UE support to complete sink level ADLs, increase postural control, increase trunk control and increase OOB/ sitting tolerance   Patient agreeable to OT treatment session, upon arrival patient was found supine in bed  In comparison to previous session, patient with improvements in UB/LB ADLS, transfers, bed mobility, standing tolerance, and activity tolerance   Patient requiring frequent rest periods and ocassional safety reminders  Patient continues to be functioning below baseline level, occupational performance remains limited secondary to factors listed above and increased risk for falls and injury     From OT standpoint, recommendation at time of d/c would be Home OT when medically stable and w/ increased family support- if unable to have family support then STR may be a better option if pt is agreeable, as pt has reported she knows she can't manage by herself at home  Patient to benefit from continued Occupational Therapy treatment while in the hospital to address deficits as defined above and maximize level of functional independence with ADLs and functional mobility     Recommendation: Other (comment) (Neuropsych for psychotherapy to ID coping skills)  OT Discharge Recommendation: Home OT (vs  STR pending clinical course and home support available)  OT - OK to Discharge: Yes (when medically stable)      Comments: Diann Gonzalez MOT, OTR/L

## 2018-04-09 NOTE — PROGRESS NOTES
Pt  With c/o 6/10 pain  Gregorio Yovani is currently clogged (Sx  Team aware)  Only can do IV pain relief at this time  Pt  Has PRN Dilaudid for severe pain  Spoke with Graylon Rom  Regarding pail relief options, awaiting further orders

## 2018-04-10 ENCOUNTER — DOCUMENTATION (OUTPATIENT)
Dept: NEUROSURGERY | Facility: CLINIC | Age: 43
End: 2018-04-10

## 2018-04-10 ENCOUNTER — ANESTHESIA (INPATIENT)
Dept: GASTROENTEROLOGY | Facility: HOSPITAL | Age: 43
DRG: 252 | End: 2018-04-10
Payer: COMMERCIAL

## 2018-04-10 ENCOUNTER — ANESTHESIA EVENT (INPATIENT)
Dept: GASTROENTEROLOGY | Facility: HOSPITAL | Age: 43
DRG: 252 | End: 2018-04-10
Payer: COMMERCIAL

## 2018-04-10 LAB
ANION GAP SERPL CALCULATED.3IONS-SCNC: 5 MMOL/L (ref 4–13)
BASOPHILS # BLD AUTO: 0.01 THOUSANDS/ΜL (ref 0–0.1)
BASOPHILS NFR BLD AUTO: 0 % (ref 0–1)
BUN SERPL-MCNC: 9 MG/DL (ref 5–25)
CALCIUM SERPL-MCNC: 9 MG/DL
CHLORIDE SERPL-SCNC: 103 MMOL/L (ref 100–108)
CO2 SERPL-SCNC: 28 MMOL/L (ref 21–32)
CREAT SERPL-MCNC: 0.36 MG/DL (ref 0.6–1.3)
EOSINOPHIL # BLD AUTO: 0.16 THOUSAND/ΜL (ref 0–0.61)
EOSINOPHIL NFR BLD AUTO: 3 % (ref 0–6)
ERYTHROCYTE [DISTWIDTH] IN BLOOD BY AUTOMATED COUNT: 16.4 % (ref 11.6–15.1)
GFR SERPL CREATININE-BSD FRML MDRD: 132 ML/MIN/1.73SQ M
GLUCOSE SERPL-MCNC: 101 MG/DL (ref 65–140)
GLUCOSE SERPL-MCNC: 82 MG/DL (ref 65–140)
GLUCOSE SERPL-MCNC: 94 MG/DL (ref 65–140)
GLUCOSE SERPL-MCNC: 96 MG/DL (ref 65–140)
GLUCOSE SERPL-MCNC: 98 MG/DL (ref 65–140)
GLUCOSE SERPL-MCNC: 99 MG/DL (ref 65–140)
HCT VFR BLD AUTO: 31 % (ref 34.8–46.1)
HGB BLD-MCNC: 9.7 G/DL (ref 11.5–15.4)
LYMPHOCYTES # BLD AUTO: 1.31 THOUSANDS/ΜL (ref 0.6–4.47)
LYMPHOCYTES NFR BLD AUTO: 24 % (ref 14–44)
MCH RBC QN AUTO: 26.5 PG (ref 26.8–34.3)
MCHC RBC AUTO-ENTMCNC: 31.3 G/DL (ref 31.4–37.4)
MCV RBC AUTO: 85 FL (ref 82–98)
MONOCYTES # BLD AUTO: 0.36 THOUSAND/ΜL (ref 0.17–1.22)
MONOCYTES NFR BLD AUTO: 7 % (ref 4–12)
NEUTROPHILS # BLD AUTO: 3.54 THOUSANDS/ΜL (ref 1.85–7.62)
NEUTS SEG NFR BLD AUTO: 66 % (ref 43–75)
NRBC BLD AUTO-RTO: 0 /100 WBCS
PLATELET # BLD AUTO: 248 THOUSANDS/UL (ref 149–390)
PMV BLD AUTO: 10.4 FL (ref 8.9–12.7)
POTASSIUM SERPL-SCNC: 4.1 MMOL/L (ref 3.5–5.3)
RBC # BLD AUTO: 3.66 MILLION/UL (ref 3.81–5.12)
SODIUM SERPL-SCNC: 136 MMOL/L (ref 136–145)
WBC # BLD AUTO: 5.38 THOUSAND/UL (ref 4.31–10.16)

## 2018-04-10 PROCEDURE — 99231 SBSQ HOSP IP/OBS SF/LOW 25: CPT | Performed by: THORACIC SURGERY (CARDIOTHORACIC VASCULAR SURGERY)

## 2018-04-10 PROCEDURE — C9113 INJ PANTOPRAZOLE SODIUM, VIA: HCPCS | Performed by: SURGERY

## 2018-04-10 PROCEDURE — 85025 COMPLETE CBC W/AUTO DIFF WBC: CPT | Performed by: STUDENT IN AN ORGANIZED HEALTH CARE EDUCATION/TRAINING PROGRAM

## 2018-04-10 PROCEDURE — 97116 GAIT TRAINING THERAPY: CPT

## 2018-04-10 PROCEDURE — 82948 REAGENT STRIP/BLOOD GLUCOSE: CPT

## 2018-04-10 PROCEDURE — 97110 THERAPEUTIC EXERCISES: CPT

## 2018-04-10 PROCEDURE — 80048 BASIC METABOLIC PNL TOTAL CA: CPT | Performed by: STUDENT IN AN ORGANIZED HEALTH CARE EDUCATION/TRAINING PROGRAM

## 2018-04-10 PROCEDURE — 97530 THERAPEUTIC ACTIVITIES: CPT

## 2018-04-10 RX ORDER — LIDOCAINE HYDROCHLORIDE 10 MG/ML
INJECTION, SOLUTION INFILTRATION; PERINEURAL AS NEEDED
Status: DISCONTINUED | OUTPATIENT
Start: 2018-04-10 | End: 2018-04-10 | Stop reason: SURG

## 2018-04-10 RX ORDER — SODIUM CHLORIDE 9 MG/ML
INJECTION, SOLUTION INTRAVENOUS CONTINUOUS PRN
Status: DISCONTINUED | OUTPATIENT
Start: 2018-04-10 | End: 2018-04-10 | Stop reason: SURG

## 2018-04-10 RX ORDER — PROPOFOL 10 MG/ML
INJECTION, EMULSION INTRAVENOUS AS NEEDED
Status: DISCONTINUED | OUTPATIENT
Start: 2018-04-10 | End: 2018-04-10 | Stop reason: SURG

## 2018-04-10 RX ADMIN — HEPARIN SODIUM 5000 UNITS: 5000 INJECTION, SOLUTION INTRAVENOUS; SUBCUTANEOUS at 15:28

## 2018-04-10 RX ADMIN — PROPOFOL 20 MG: 10 INJECTION, EMULSION INTRAVENOUS at 14:08

## 2018-04-10 RX ADMIN — PROPOFOL 20 MG: 10 INJECTION, EMULSION INTRAVENOUS at 14:05

## 2018-04-10 RX ADMIN — HEPARIN SODIUM 5000 UNITS: 5000 INJECTION, SOLUTION INTRAVENOUS; SUBCUTANEOUS at 23:19

## 2018-04-10 RX ADMIN — PANTOPRAZOLE SODIUM 40 MG: 40 INJECTION, POWDER, FOR SOLUTION INTRAVENOUS at 09:37

## 2018-04-10 RX ADMIN — PROPOFOL 20 MG: 10 INJECTION, EMULSION INTRAVENOUS at 14:06

## 2018-04-10 RX ADMIN — HEPARIN SODIUM 5000 UNITS: 5000 INJECTION, SOLUTION INTRAVENOUS; SUBCUTANEOUS at 06:13

## 2018-04-10 RX ADMIN — PROPOFOL 130 MG: 10 INJECTION, EMULSION INTRAVENOUS at 14:00

## 2018-04-10 RX ADMIN — PROPOFOL 30 MG: 10 INJECTION, EMULSION INTRAVENOUS at 14:09

## 2018-04-10 RX ADMIN — LORAZEPAM 0.5 MG: 2 INJECTION INTRAMUSCULAR; INTRAVENOUS at 06:15

## 2018-04-10 RX ADMIN — SODIUM CHLORIDE: 0.9 INJECTION, SOLUTION INTRAVENOUS at 13:53

## 2018-04-10 RX ADMIN — ONDANSETRON 4 MG: 2 INJECTION INTRAMUSCULAR; INTRAVENOUS at 03:30

## 2018-04-10 RX ADMIN — PROPOFOL 50 MG: 10 INJECTION, EMULSION INTRAVENOUS at 14:02

## 2018-04-10 RX ADMIN — DEXTROSE, SODIUM CHLORIDE, AND POTASSIUM CHLORIDE 100 ML/HR: 5; .45; .15 INJECTION INTRAVENOUS at 15:29

## 2018-04-10 RX ADMIN — METOCLOPRAMIDE 10 MG: 5 INJECTION, SOLUTION INTRAMUSCULAR; INTRAVENOUS at 06:13

## 2018-04-10 RX ADMIN — DEXTROSE, SODIUM CHLORIDE, AND POTASSIUM CHLORIDE 100 ML/HR: 5; .45; .15 INJECTION INTRAVENOUS at 01:19

## 2018-04-10 RX ADMIN — LIDOCAINE HYDROCHLORIDE 50 MG: 10 INJECTION, SOLUTION INFILTRATION; PERINEURAL at 14:00

## 2018-04-10 RX ADMIN — ONDANSETRON 4 MG: 2 INJECTION INTRAMUSCULAR; INTRAVENOUS at 18:25

## 2018-04-10 RX ADMIN — PROPOFOL 20 MG: 10 INJECTION, EMULSION INTRAVENOUS at 14:03

## 2018-04-10 NOTE — PLAN OF CARE
Problem: PHYSICAL THERAPY ADULT  Goal: Performs mobility at highest level of function for planned discharge setting  See evaluation for individualized goals  Treatment/Interventions: Functional transfer training, LE strengthening/ROM, Elevations, Therapeutic exercise, Endurance training, Bed mobility, Gait training, Equipment eval/education, Spoke to nursing, Spoke to case management, OT  Equipment Recommended:  (TBD)       See flowsheet documentation for full assessment, interventions and recommendations  Outcome: Progressing  Prognosis: Good  Problem List: Decreased strength, Decreased endurance, Decreased mobility, Pain  Assessment: Pt is progressing well with functional mobility  Slow but steady today with gait training  Required min assist to complete 2 steps due to weakness BLE  Appropriate rest breaks required between exercise/activity  Pt would benefit from continued physical therapy to maximize functional mobility and safety  Barriers to Discharge: Inaccessible home environment     Recommendation: Post acute IP rehab, Other (Comment) (cont to monitor progress)          See flowsheet documentation for full assessment

## 2018-04-10 NOTE — PHYSICAL THERAPY NOTE
Physical Therapy Progress Note     04/10/18 0902   Pain Assessment   Pain Assessment 0-10   Pain Score 6   Pain Type Acute pain   Pain Location Abdomen   Restrictions/Precautions   Weight Bearing Precautions Per Order No   Other Precautions Cognitive;Multiple lines   General   Chart Reviewed Yes   Family/Caregiver Present No   Cognition   Overall Cognitive Status WFL   Arousal/Participation Alert; Cooperative   Subjective   Subjective Pt agrees to participate   Bed Mobility   Supine to Sit 4  Minimal assistance   Additional items Assist x 1   Sit to Supine 5  Supervision   Additional items Assist x 1   Transfers   Sit to Stand 5  Supervision   Additional items Assist x 1   Stand to Sit 5  Supervision   Additional items Assist x 1   Stand pivot 5  Supervision   Additional items Assist x 1   Toilet transfer 5  Supervision   Additional items Assist x 1;Commode   Ambulation/Elevation   Gait pattern Excessively slow   Gait Assistance 5  Supervision   Additional items Assist x 1   Assistive Device None   Distance 300 feet and 200 feet   Stair Management Assistance 4  Minimal assist   Additional items Assist x 1;Verbal cues   Stair Management Technique One rail R;Step to pattern   Number of Stairs 2   Balance   Static Sitting Fair +   Dynamic Sitting Fair   Static Standing Fair   Dynamic Standing Fair -   Ambulatory Fair -   Endurance Deficit   Endurance Deficit Yes   Endurance Deficit Description weakness and fatigue   Activity Tolerance   Activity Tolerance Patient limited by fatigue   Nurse Made Aware Ok to see per MANDIE Mccollum   Exercises   THR Supine;20 reps;AROM; Bilateral   Assessment   Prognosis Good   Problem List Decreased strength;Decreased endurance;Decreased mobility;Pain   Assessment Pt is progressing well with functional mobility  Slow but steady today with gait training  Required min assist to complete 2 steps due to weakness BLE  Appropriate rest breaks required between exercise/activity    Pt would benefit from continued physical therapy to maximize functional mobility and safety  Barriers to Discharge Inaccessible home environment   Goals   Patient Goals None stated   LTG Expiration Date 04/11/18   Treatment Day 5   Plan   Treatment/Interventions Functional transfer training;LE strengthening/ROM; Elevations; Endurance training; Therapeutic exercise;Patient/family training;Bed mobility;Gait training   Progress Progressing toward goals   PT Frequency 5x/wk   Recommendation   Recommendation Post acute IP rehab; Other (Comment)  (cont to monitor progress)     Jean-Claude Briggs, PTA

## 2018-04-10 NOTE — OP NOTE
**** GI/ENDOSCOPY REPORT ****     PATIENT NAME: AIDEE NICK - VISIT ID:  Patient ID: [id number]   YOB: 1975     INTRODUCTION: Esophagogastroduodenoscopy - A 36 female patient presents   for an outpatient Esophagogastroduodenoscopy at [address] [place] [address]  INDICATIONS: Malnutrition needs Kaofed  CONSENT: The benefits, risks, and alternatives to the procedure were   discussed and informed consent was obtained from the patient  PREPARATION:  EKG, pulse, pulse oximetry and blood pressure were monitored   throughout the procedure  MEDICATIONS:  Anesthesia administered by anesthesiologist      PROCEDURE:  The endoscope was passed without difficulty through the mouth   under direct visualization and advanced to the 2nd portion of the   duodenum  The scope was withdrawn and the mucosa was carefully examined  FINDINGS:   Esophagus: LA Class D esophagitis was found in the esophagus  GE junction: The GE junction appeared to be normal   Stomach: There was an   area of heaped up mucosa likely granulation tissue In the proximal gastric   body  The rest of the stomach appeared to be unremarkable  Pylorus: The   pylorus appeared to be normal, symmetrical, and patent  Duodenum: The   duodenal bulb, 1st portion of the duodenum, 2nd portion of the duodenum,   and 3rd portion of the duodenum appeared to be normal  Successful   placement of kaofed tube this was clipped to D3      COMPLICATIONS: There were no complications  IMPRESSIONS: Esophagitis seen  Normal GE junction  There was an area of   heaped up mucosa likely granulation tissue In the proximal gastric body  The rest of the stomach appeared to be unremarkable  Normal, symmetrical,   and patent pylorus  Normal duodenal bulb, 1st portion of the duodenum,   2nd portion of the duodenum, and 3rd portion of the duodenum  Old feeding   tube was bent likely cause for malfunction   Tube was successfully replaced   and clipped with a loop in D3 area  RECOMMENDATIONS:     ESTIMATED BLOOD LOSS:     PATHOLOGY SPECIMENS:     PROCEDURE CODES: 81256 - EGD flexible; with tube or catheter placement     ICD-9 Codes: 263 9 Unspecified protein-calorie malnutrition 530 10   Esophagitis, unspecified     ICD-10 Codes: E63 9 Nutritional deficiency, unspecified K20 9 Esophagitis,   unspecified     PERFORMED BY: FRANCOIS Browning  on 04/10/2018  Version 1, electronically signed by FRANCOIS York  on 04/10/2018 at   14:21

## 2018-04-10 NOTE — ANESTHESIA POSTPROCEDURE EVALUATION
Post-Op Assessment Note      CV Status:  Stable    Mental Status:  Alert and awake    Hydration Status:  Euvolemic    PONV Controlled:  Controlled    Airway Patency:  Patent    Post Op Vitals Reviewed: Yes          Staff: Anesthesiologist, CRNA       Comments: VSS, awake, reflexes itact, maintains own airway            /58 (04/10/18 1422)    Temp      Pulse 82 (04/10/18 1422)   Resp 16 (04/10/18 1422)    SpO2 99 % (04/10/18 1422)

## 2018-04-10 NOTE — PROGRESS NOTES
04/10/2018-CALLED PT TO RESCHEDULE TODAY'S "NO SHOW" APPT AND Ct, BUT MAILBOX IS FULL AND COULD NOT LEAVE A MESSAGE

## 2018-04-10 NOTE — PROGRESS NOTES
Progress Note - Thoracic Surgery   Roger Zuniga 36 y o  female MRN: [id number]  Unit/Bed#: [facility] 427-01 Encounter: [id number]    Assessment:  35 yo F with proximal gastric perforation following hiatal hernia repair, s/p multiple surgical interventions including esophageal stent with removal, now s/p EGD with postpyloric keofed placement, botox injection, antral bx 4/5    Plan:  - to GI for keofed replacement  - hold TF  - OOB, ambulate  - DVT -- SQH    Subjective/Objective     Subjective: Patient reports mild abdominal pain  Objective:     Vitals: Blood pressure 116/74, pulse 64, temperature 98 3 °F (36 8 °C), temperature source Oral, resp  rate 18, height 5' 4" (1 626 m), weight 55 7 kg (122 lb 12 7 oz), SpO2 96 %, not currently breastfeeding  ,Body mass index is 21 08 kg/m²  I/O       04/08 0701 - 04/09 0700 04/09 0701 - 04/10 0700    I V  (mL/kg) 128 3 (2 3) 1878 3 (33 7)    Total Intake(mL/kg) 128 3 (2 3) 1878 3 (33 7)    Urine (mL/kg/hr) 1100 (0 8) 1900 (1 4)    Drains 20 (0) 27 5 (0)    Total Output 1120 1927 5    Net -991 7 -49 2                Physical Exam:  GEN: NAD  HEENT: MMM  CV: RRR  Lung: Normal effort  Ab: Soft, NT/ND, mid ADENIKE serous  Extrem: No CCE  Neuro:  A+Ox3    Lab, Imaging and other studies: CBC with diff: No results found for: WBC, HGB, HCT, MCV, PLT, ADJUSTEDWBC, MCH, MCHC, RDW, MPV, NRBC, BMP/CMP: No results found for: NA, K, CL, CO2, ANIONGAP, BUN, CREATININE, GLUCOSE, CALCIUM, AST, ALT, ALKPHOS, PROT, ALBUMIN, BILITOT, EGFR, Magnesium: No results found for: MAG  VTE Pharmacologic Prophylaxis: Heparin  VTE Mechanical Prophylaxis: sequential compression device

## 2018-04-10 NOTE — OCCUPATIONAL THERAPY NOTE
OT CANCEL NOTE    Chart reviewed  Attempted to see pt this PM however pt is off floor in GI for EGD and kaofed placement  Will continue to follow pt on caseload and see as clinically able and medically appropriate       Diann MIRANDA, OTR/L

## 2018-04-10 NOTE — ANESTHESIA PREPROCEDURE EVALUATION
Review of Systems/Medical History  Patient summary reviewed  Chart reviewed  No history of anesthetic complications     Cardiovascular    Comment: ECHO 1/ 2018--EF 56%, normal systolic fxn, no reg abnml,  Pulmonary  Smoker ex-smoker  ,        GI/Hepatic    GERD ,   Comment: proximal gastric perforation following hiatal hernia repair, s/p multiple surgical interventions including esophageal stent with removal    On TPN    Has nausea, reglan and zofran provided     Negative  ROS        Endo/Other    Comment: Right eye choroidal nevus    GYN  Negative gynecology ROS          Hematology  Negative hematology ROS      Musculoskeletal  Negative musculoskeletal ROS   Arthritis     Neurology    Headaches,   Comment: Idiopathic intracranial hypertension--s/p  shunt 5/30/2017--in light of current sepsis, shunt removed 2/5/18 Psychology   Negative psychology ROS              Physical Exam    Airway    Mallampati score: II  TM Distance: >3 FB  Neck ROM: full     Dental   No notable dental hx     Cardiovascular      Pulmonary      Other Findings        Anesthesia Plan  ASA Score- 3     Anesthesia Type- IV sedation with anesthesia with ASA Monitors  Additional Monitors:   Airway Plan:         Plan Factors-    Induction- intravenous  Postoperative Plan-     Informed Consent- Anesthetic plan and risks discussed with patient  I personally reviewed this patient with the CRNA  Discussed and agreed on the Anesthesia Plan with the POLO Moyer

## 2018-04-10 NOTE — PROGRESS NOTES
I also tried calling patient to check on CT Head but was unable to reach or leave message  At this point we will wait until patient reaches the office  I will mail her a letter  Thanks

## 2018-04-11 LAB
GLUCOSE SERPL-MCNC: 107 MG/DL (ref 65–140)
GLUCOSE SERPL-MCNC: 119 MG/DL (ref 65–140)
GLUCOSE SERPL-MCNC: 83 MG/DL (ref 65–140)

## 2018-04-11 PROCEDURE — 97110 THERAPEUTIC EXERCISES: CPT

## 2018-04-11 PROCEDURE — 82948 REAGENT STRIP/BLOOD GLUCOSE: CPT

## 2018-04-11 PROCEDURE — C9113 INJ PANTOPRAZOLE SODIUM, VIA: HCPCS | Performed by: SURGERY

## 2018-04-11 PROCEDURE — 97116 GAIT TRAINING THERAPY: CPT

## 2018-04-11 PROCEDURE — 97535 SELF CARE MNGMENT TRAINING: CPT

## 2018-04-11 RX ADMIN — ONDANSETRON 4 MG: 2 INJECTION INTRAMUSCULAR; INTRAVENOUS at 01:35

## 2018-04-11 RX ADMIN — ONDANSETRON 4 MG: 2 INJECTION INTRAMUSCULAR; INTRAVENOUS at 09:04

## 2018-04-11 RX ADMIN — HEPARIN SODIUM 5000 UNITS: 5000 INJECTION, SOLUTION INTRAVENOUS; SUBCUTANEOUS at 14:52

## 2018-04-11 RX ADMIN — PANTOPRAZOLE SODIUM 40 MG: 40 INJECTION, POWDER, FOR SOLUTION INTRAVENOUS at 09:04

## 2018-04-11 RX ADMIN — LORAZEPAM 0.5 MG: 2 INJECTION INTRAMUSCULAR; INTRAVENOUS at 03:15

## 2018-04-11 RX ADMIN — HEPARIN SODIUM 5000 UNITS: 5000 INJECTION, SOLUTION INTRAVENOUS; SUBCUTANEOUS at 06:15

## 2018-04-11 RX ADMIN — HEPARIN SODIUM 5000 UNITS: 5000 INJECTION, SOLUTION INTRAVENOUS; SUBCUTANEOUS at 22:47

## 2018-04-11 RX ADMIN — DEXTROSE, SODIUM CHLORIDE, AND POTASSIUM CHLORIDE 100 ML/HR: 5; .45; .15 INJECTION INTRAVENOUS at 00:36

## 2018-04-11 NOTE — PROGRESS NOTES
Progress Note - Thoracic Surgery   Ruth Ritchie 36 y o  female MRN: [id number]  Unit/Bed#: [facility] 427-01 Encounter: [id number]    Assessment:  35 yo F with proximal gastric perforation following hiatal hernia repair, s/p multiple surgical interventions including esophageal stent with removal, now s/p EGD with postpyloric keofed placement, botox injection, antral bx 4/5    - Keofed replaced by GI yesterday  Tube feeds resumed    Plan:  - Cont advance tube feeds to goal  - OOB, ambulate  - DVT -- SQH    Subjective/Objective     Subjective:   Keofed replaced last night  No new events  Objective:     Vitals: Blood pressure 126/70, pulse 74, temperature 97 9 °F (36 6 °C), temperature source Oral, resp  rate 18, height 5' 4" (1 626 m), weight 55 3 kg (122 lb), SpO2 98 %, not currently breastfeeding  ,Body mass index is 20 94 kg/m²  I/O       04/08 0701 - 04/09 0700 04/09 0701 - 04/10 0700    I V  (mL/kg) 128 3 (2 3) 1878 3 (33 7)    Total Intake(mL/kg) 128 3 (2 3) 1878 3 (33 7)    Urine (mL/kg/hr) 1100 (0 8) 1900 (1 4)    Drains 20 (0) 27 5 (0)    Total Output 1120 1927 5    Net -991 7 -49 2                Physical Exam:  GEN: NAD  CV: RRR  Lung: No resp distress  Ab: Soft  Non-tender   Midline drain w/ 30ml yellow  Neuro: AAOx3    Lab, Imaging and other studies: CBC with diff: No results found for: WBC, HGB, HCT, MCV, PLT, ADJUSTEDWBC, MCH, MCHC, RDW, MPV, NRBC, BMP/CMP: No results found for: NA, K, CL, CO2, ANIONGAP, BUN, CREATININE, GLUCOSE, CALCIUM, AST, ALT, ALKPHOS, PROT, ALBUMIN, BILITOT, EGFR, Magnesium: No results found for: MAG  VTE Pharmacologic Prophylaxis: Heparin  VTE Mechanical Prophylaxis: sequential compression device

## 2018-04-11 NOTE — PLAN OF CARE
Problem: PHYSICAL THERAPY ADULT  Goal: Performs mobility at highest level of function for planned discharge setting  See evaluation for individualized goals  Treatment/Interventions: Functional transfer training, LE strengthening/ROM, Elevations, Therapeutic exercise, Endurance training, Bed mobility, Gait training, Equipment eval/education, Spoke to nursing, Spoke to case management, OT  Equipment Recommended:  (TBD)       See flowsheet documentation for full assessment, interventions and recommendations  Prognosis: Good  Problem List: Decreased strength, Impaired balance, Decreased mobility, Decreased endurance, Decreased skin integrity, Pain  Assessment: Pt  noted to improve with her overall functional mobility  However noted decreased balance and endurance to activities  pt  teary during the session asking if she would ever get better  performed standing LE [place] with and without UE support  pt  unable to de EC dynamic balance activities  Performed alt  standing marches, ham curls, hip flex, ext, abduction, HR/TR  Unable to perform suqats reporting she felt dizzy  Ambulated 400ft with no AD and S  Noted gait to be unsteady occcasionally though no overt LOB  Pt  educated in [address] [place] [address] with family  Pt  able to negotiate steps with CS with both hands R handrail  icnreased time and effort noted  pt  fatigued with 4 step negotiation  Will continue to follow during the stay to improve balance, strength and overall activity endurance  Barriers to Discharge: Inaccessible home environment     Recommendation:  (Rehab vs home pending progress)          See flowsheet documentation for full assessment

## 2018-04-11 NOTE — PHYSICAL THERAPY NOTE
Physical Therapy Progress Note     04/11/18 1200   Pain Assessment   Pain Assessment (I am dealing with it )   Restrictions/Precautions   Weight Bearing Precautions Per Order No   Other Precautions Fall Risk   General   Chart Reviewed Yes   Family/Caregiver Present Yes   Cognition   Overall Cognitive Status WFL   Subjective   Subjective Agreeable to PT  Identified pt  with name and bracelet ID   Transfers   Sit to Stand 5  Supervision   Additional items Assist x 1; Armrests   Stand to Sit 5  Supervision   Additional items Assist x 1; Increased time required;Armrests   Stand pivot 5  Supervision   Additional items Assist x 1   Ambulation/Elevation   Gait pattern Excessively slow; Short stride;Decreased foot clearance  (Lateral sways)   Gait Assistance 5  Supervision   Additional items Assist x 1;Verbal cues   Assistive Device None   Distance 400ft   Stair Management Assistance 5  Supervision   Additional items Assist x 1;Verbal cues; Increased time required   Stair Management Technique One rail R;Step to pattern; Foreward; Sideways;Nonreciprocal   Number of Stairs 4   Balance   Static Sitting Good   Static Standing Fair   Ambulatory Fair -   Endurance Deficit   Endurance Deficit Yes   Endurance Deficit Description General deconditiong   Activity Tolerance   Activity Tolerance Patient limited by fatigue   Nurse Made Aware yes   Exercises   TKR Standing;20 reps;Bilateral;AROM  (With and without UE support)   Assessment   Prognosis Good   Problem List Decreased strength; Impaired balance;Decreased mobility; Decreased endurance;Decreased skin integrity;Pain   Assessment Pt  noted to improve with her overall functional mobility  However noted decreased balance and endurance to activities  pt  teary during the session asking if she would ever get better  performed standing LE [place] with and without UE support  pt  unable to de EC dynamic balance activities  Performed alt  standing marches, ham curls, hip flex, ext, abduction, HR/TR  Unable to perform suqats reporting she felt dizzy  Ambulated 400ft with no AD and S  Noted gait to be unsteady occcasionally though no overt LOB  Pt  educated in [address] [place] [address] with family  Pt  able to negotiate steps with CS with both hands R handrail  icnreased time and effort noted  pt  fatigued with 4 step negotiation  Will continue to follow during the stay to improve balance, strength and overall activity endurance  Goals   Patient Goals None reported   LTG Expiration Date 04/11/18   Treatment Day 6   Plan   Treatment/Interventions Functional transfer training;LE strengthening/ROM; Elevations; Therapeutic exercise; Endurance training;Gait training;Patient/family training;Spoke to nursing;Family   Progress Progressing toward goals   PT Frequency 5x/wk   Recommendation   Recommendation (Rehab vs home pending progress)         Verlena Nails, PTA

## 2018-04-11 NOTE — PROGRESS NOTES
Patient with keofed placed on 4/10 to have tube feeds restarted  No notes from GI lab found  Spoke with white surgery resident Saundra Heart to start tube feeds  Start at 30 mL/hr and increase by 20 Q4h until goal  No medications, including liquid through keofed tube for tonight  Will continue to monitor

## 2018-04-11 NOTE — CASE MANAGEMENT
Thank you,  [address] [facility] in the [facility] by [address] for 2017  Network Utilization Review Department  Phone: [phone]; Fax [phone]  ATTENTION: The Network Utilization Review Department is now centralized for our 7 Facilities  Make a note that we have a new phone and fax numbers for our Department  Please call with any questions or concerns to [phone] and carefully follow the prompts so that you are directed to the right person  All voicemails are confidential  Fax any determinations, approvals, denials, and requests for initial or continue stay review clinical to [phone]   Due to HIGH CALL volume, it would be easier if you could please send faxed requests to expedite your requests and in part, help us provide discharge notifications faster     ==============================================================    Continued Stay Review    Date: 04/11/2018    Vital Signs: /70 (BP Location: Right arm)   Pulse 84   Temp 97 9 °F (36 6 °C) (Oral)   Resp 20   Ht 5' 4" (1 626 m)   Wt 55 3 kg (122 lb)   LMP  (LMP Unknown)   SpO2 97%   BMI 20 94 kg/m²     Medications:   Scheduled Meds:   Current Facility-Administered Medications:  acetaminophen 650 mg Rectal Once PRN   bisacodyl 10 mg Rectal Daily PRN   heparin (porcine) 5,000 Units Subcutaneous Q8H [address]   insulin lispro 1-5 Units Subcutaneous Q6H [address]   ketorolac 15 mg Intravenous Q6H PRN   LORazepam 0 5 mg Intravenous Q6H PRN   metoclopramide 10 mg Intravenous Q6H PRN   ondansetron 4 mg Intravenous Q6H PRN   pancrelipase (Lip-Prot-Amyl) 24,000 Units Oral Once   pantoprazole 40 mg Intravenous Q24H [address]     Abnormal Labs/Diagnostic Results:     Age/Sex: 36 y o  female   VTE Pharmacologic Prophylaxis: Heparin  VTE Mechanical Prophylaxis: sequential compression device  Assessment/Plan:   Impression:  Proximal gastric perforation  Assessment:  35 yo F with proximal gastric perforation following hiatal hernia repair, s/p multiple surgical interventions including esophageal stent with removal, now s/p EGD with postpyloric keofed placement, botox injection, antral bx 4/5     - Trixie Gubler replaced by GI yesterday   Tube feeds resumed     Plan:  - Cont advance tube feeds to goal  - OOB, ambulate  - DVT -- [facility]    Discharge Plan:

## 2018-04-11 NOTE — OCCUPATIONAL THERAPY NOTE
Occupational Therapy Treatment Note      Effieangela Pleitezg    4/11/2018    Patient Active Problem List   Diagnosis    Pseudotumor cerebri    S/P  shunt    Occipital headache    Brain condition    Gastric leak    Acute peritonitis    Idiopathic intracranial hypertension    S/P  shunt    Infection of  (ventriculoperitoneal) shunt, subsequent encounter    Murmur, cardiac    Refusal of blood product    Gastroesophageal reflux disease    Choroidal nevus, right eye    Moderate protein-calorie malnutrition (HCC)    Gastric perforation (HCC)    Postoperative intra-abdominal abscess ([address] )    Abdominal wound dehiscence    Neutrophilic leukocytosis    Left-sided chest wall pain       Past Medical History:   Diagnosis Date    Brain condition     Pseudotumor Cerebri     Migraine     Obesity     Papilledema, both eyes     Presence of lumboperitoneal shunt     Resolved: Sep 20, 2017    Rotator cuff tendinitis     Resolved: Aug 23, 2017    Visual field defect        Past Surgical History:   Procedure Laterality Date    CSF SHUNT      LP shunt - 2015 -  shunt - 2017    ESOPHAGOGASTRODUODENOSCOPY N/A 2/23/2018    Procedure: ESOPHAGOGASTRODUODENOSCOPY (EGD) WITH ESOPHAGEAL STENT PLACEMENT;  Surgeon: Fela Ca MD;  [facility];  Service: Thoracic    ESOPHAGOGASTRODUODENOSCOPY N/A 2/23/2018    Procedure: ESOPHAGOGASTRODUODENOSCOPY (EGD) WITH REMOVAL ESOPHAGEAL STENT  AND REPLACEMENT WITH 23mm X155mm 1[address] [place] [address],[address];  Surgeon: Fela Ca MD;  [facility];  Service: Thoracic    ESOPHAGOGASTRODUODENOSCOPY N/A 3/28/2018    Procedure: ESOPHAGOGASTRODUODENOSCOPY (EGD) with PEJ placement ;  Surgeon: Joann Harmon MD;  [facility]; Service: Gastroenterology    ESOPHAGOGASTRODUODENOSCOPY N/A 4/5/2018    Procedure: ESOPHAGOGASTRODUODENOSCOPY (EGD) with botox injection and kaofed placement;  Surgeon: Jaxson Valdez DO;  [facility];   Service: Gastroenterology    ESOPHAGOGASTRODUODENOSCOPY N/A 4/10/2018    Procedure: ESOPHAGOGASTRODUODENOSCOPY (EGD) with Kaofed placement;  Surgeon: Yadira Lombardi MD;  [facility]; Service: Gastroenterology    ESOPHAGOSCOPY WITH STENT INSERTION N/A 1/24/2018    Procedure: INSERTION STENT ESOPHAGEAL;  Surgeon: Julianna Layton MD;  [facility]; Service: Gastroenterology    EYE SURGERY      for "crossed eyed" (in 2nd grade)     GASTRIC BYPASS  2016    HERNIA REPAIR      HYSTERECTOMY      LAPAROTOMY N/A 1/25/2018    Procedure: Exploratory Laparotomy, wash out,placement of drains, placement of NG feeding tube ; Surgeon: Amador Richey DO;  [facility];  Service: General    MT CREATE SHUNT:VENTRIC-PERITONEAL Right 5/31/2017    Procedure: IMAGE GUIDED CORONAL PLACEMENT OF PROGRAMABLE VENTRICULAR-PERITONEAL SHUNT, REMOVAL OF LP SHUNT ;  Surgeon: Karrie Sr MD;  [facility];  Service: Neurosurgery    MT Jordi Hawa CSF SHUNT,W/O REPLACE Right 2/5/2018    Procedure: Removal of  shunt;  Surgeon: Karrie Sr MD;  [facility];  Service: Neurosurgery    MT REPLACEMENT/REVISION,CSF SHUNT Right 1/25/2018    Procedure: Externalization of right-sided SHUNT VENTRICULAR-PERITONEAL in anterior chest wall ribs two and three level  ;  Surgeon: Zeina Singh MD;  [facility];  Service: Neurosurgery      04/11/18 1503   Restrictions/Precautions   Weight Bearing Precautions Per Order No   Other Precautions Cognitive;Multiple lines; Fall Risk;Pain   General   Family/Caregiver Present Pt's mother   Lifestyle   Autonomy Pt reports being I w/ ADLS, IADLS, transfers and functional mobility PTA   Reciprocal Relationships Pt lives w/ fiance and 15 y/o son   Service to Others Pt does not work   Intrinsic Gratification Pt did not report enjoyable activities PTA   Pain Assessment   Pain Assessment 0-10   Pain Score 5   Pain Type Acute pain;Surgical pain   Pain Location Abdomen;Back   Pain Orientation Mid;Lower   Pain Descriptors Aching;Discomfort   Pain Frequency Constant/continuous   Pain Onset Gradual   Clinical Progression Not changed   Patient's Stated Pain Goal No pain   Hospital Pain Intervention(s) Repositioned; Emotional support   Diversional Activities Television  (family)   Response to Interventions tolerated well   Multiple Pain Sites Yes   ADL   LB Dressing Assistance 7  Independent   LB Dressing Deficit Don/doff R shoe;Don/doff L shoe   LB Dressing Comments Pt don/doffed slippers while seated EOB   Bed Mobility   Supine to Sit 6  Modified independent   Additional items Increased time required;HOB elevated; Bedrails   Sit to Supine 6  Modified independent   Additional items Bedrails; Increased time required   Additional Comments Pt went from supine<>sit w/ Mod I, HOB elevated and use of bed rails   Transfers   Sit to Stand 5  Supervision   Additional items Increased time required;Verbal cues   Stand to Sit 5  Supervision   Additional items Increased time required;Verbal cues   Additional Comments Pt performed sit-stand from EOB w/ S for safety and no AD in standing   Therapeutic Excerise-Strength   UE Strength Yes   Right Upper Extremity- Strength   R Shoulder Flexion; Internal rotation   R Elbow Elbow flexion;Elbow extension   R Hand Thumb; Index finger; Long finger;Ring finger;Little finger   R Position Seated   Equipment Other (Comment)  (3 lb bottle)   R Weight/Reps/Sets Pt performed 2 sets of 15 bicep curls, chest press, and shoulder internal rotation to increased UE strength for functional tasks   Left Upper Extremity-Strength   L Shoulder Flexion; Internal rotation   L Elbow Elbow flexion;Elbow extension   L Hand Thumb; Index finger; Long finger;Ring finger;Little finger   L Position Seated   Equipment Other (Comment)  (3 lb bottle)   L Weights/Reps/Sets Pt performed 2 sets of 15 bicep curls, chest press, and shoulder internal rotation to increased UE strength for functional tasks   Cognition   Overall Cognitive Status WFL   Arousal/Participation Responsive; Cooperative   Attention Within functional limits   Orientation Level Oriented X4   Memory Within functional limits   Following Commands Follows one step commands without difficulty   Comments Pt is pleasant and cooperative  Overall happier mood today as compared to previous sessions  Mother present in room, and pt enjoyed having conversation with her mother and was laughing and telling stories  Pt had moments of tearfulness but overall happier demeanor  Additional Activities   Additional Activities Other (Comment)  (Sit-stand transfer exercises)   Additional Activities Comments Pt performed 10 consecutive sit-stand transfers from EOB to increased LE strength, increase balance, and trunk control for engagement in functional tasks  Activity Tolerance   Activity Tolerance Patient tolerated treatment well   Medical Staff Made Aware Rolando LOCKWOOD   Assessment   Assessment Patient participated in Skilled OT session 18 with a focus on re-evaluation for  goals and interventions consisting of ADL re training with the use of correct body mechnaics, Energy Conservation techniques, deep breathing technique, safety awareness and fall prevention techniques, therapeutic exercise to: increase functional use of BUEs, increase BUE muscle strength ,  therapeutic activities to: increase activity tolerance, increase standing tolerance time with unilateral UE support to complete sink level ADLs, increase dynamic sit/ stand balance during functional activity , increase postural control, increase trunk control and increase OOB/ sitting tolerance  Patient agreeable to OT treatment session, upon arrival patient was found supine in bed  In comparison to previous session, patient with improvements in sit-stand transfers, bed mobility, standing tolerance, activity tolerance, LB dressing, and UE strengthening   Patient requiring frequent rest periods and ocassional safety reminders  Patient continues to be functioning below baseline level, occupational performance remains limited secondary to factors listed above and increased risk for falls and injury  From OT standpoint, recommendation at time of d/c would be Home OT when medically stable  Patient to benefit from continued Occupational Therapy treatment while in the hospital to address deficits as defined above and maximize level of functional independence with ADLs and functional mobility  Continued w/ previously established goals  Plan   Treatment Interventions ADL retraining;Functional transfer training;UE strengthening/ROM; Endurance training;Cognitive reorientation;Patient/family training;Equipment evaluation/education; Compensatory technique education;Continued evaluation; Energy conservation; Activityengagement   Goal Expiration Date 04/21/18   Treatment Day 7   OT Frequency 3-5x/wk   Recommendation   OT Discharge Recommendation Home OT   OT - OK to Discharge Yes  (when medically stable)   Barthel Index   Feeding 0   Bathing 5   Grooming Score 5   Dressing Score 10   Bladder Score 10   Bowels Score 10   Toilet Use Score 10   Transfers (Bed/Chair) Score 15   Mobility (Level Surface) Score 0   Stairs Score 0   Barthel Index Score 65   Modified Crittenden Scale   Modified Crittenden Scale 3       GOALS     1) Pt will improve activity tolerance to G for min 30 min txment sessions     2) Pt will complete UB/LB dressing/self care w/ mod I using adaptive device and DME as needed     3) Pt will complete bathing w/ Mod I w/ use of AE and DME as needed     4) Pt will complete toileting w/ mod I w/ G hygiene/thoroughness using DME as needed     5) Pt will improve functional transfers to Mod I on/off all surfaces using DME as needed w/ G balance/safety      6) Pt will improve functional mobility during ADL/IADL/leisure tasks to Mod I using DME as needed w/ G balance/safety      7) Pt will participate in simulated IADL management task to increase independence to Mod I w/ G safety and endurance     8) Pt will engage in ongoing cognitive assessment w/ G participation w/ mod I to assist w/ safe d/c planning/recommendations     9) Pt will demonstrate G carryover of pt/caregiver education and training as appropriate w/ mod I w/o cues w/ good tolerance     10) Pt will demonstrate 100% carryover of energy conservation techniques w/ mod I t/o functional I/ADL/leisure tasks w/o cues s/p skilled education    [facility], OTR/L

## 2018-04-11 NOTE — PLAN OF CARE
Problem: OCCUPATIONAL THERAPY ADULT  Goal: Performs self-care activities at highest level of function for planned discharge setting  See evaluation for individualized goals  Treatment Interventions: ADL retraining, Functional transfer training, UE strengthening/ROM, Endurance training, Cognitive reorientation, Patient/family training, Equipment evaluation/education, Compensatory technique education, Continued evaluation, Energy conservation, Activityengagement          See flowsheet documentation for full assessment, interventions and recommendations  Limitation: Decreased ADL status, Decreased UE ROM, Decreased UE strength, Decreased Safe judgement during ADL, Decreased cognition, Decreased endurance, Decreased self-care trans, Decreased high-level ADLs  Prognosis: Fair  Assessment: Patient participated in Skilled OT session 18 with a focus on re-evaluation for  goals and interventions consisting of ADL re training with the use of correct body mechnaics, Energy Conservation techniques, deep breathing technique, safety awareness and fall prevention techniques, therapeutic exercise to: increase functional use of BUEs, increase BUE muscle strength ,  therapeutic activities to: increase activity tolerance, increase standing tolerance time with unilateral UE support to complete sink level ADLs, increase dynamic sit/ stand balance during functional activity , increase postural control, increase trunk control and increase OOB/ sitting tolerance  Patient agreeable to OT treatment session, upon arrival patient was found supine in bed  In comparison to previous session, patient with improvements in sit-stand transfers, bed mobility, standing tolerance, activity tolerance, LB dressing, and UE strengthening  Patient requiring frequent rest periods and ocassional safety reminders   Patient continues to be functioning below baseline level, occupational performance remains limited secondary to factors listed above and increased risk for falls and injury  From OT standpoint, recommendation at time of d/c would be Home OT when medically stable  Patient to benefit from continued Occupational Therapy treatment while in the hospital to address deficits as defined above and maximize level of functional independence with ADLs and functional mobility  Continued w/ previously established goals    Recommendation: Other (comment) (Neuropsych for psychotherapy to ID coping skills)  OT Discharge Recommendation: Home OT  OT - OK to Discharge: Yes (when medically stable)      Comments: Diann Gonzalez MOT, OTR/L

## 2018-04-11 NOTE — PROGRESS NOTES
Patient keofed tube previously had been clogged and needed to be replaced  Had been discussed to have tube flushed frequently and that tube is only to be used for feeding purposes only  Meds are still ordered as NGT route  Questioned PA [facility] for direction and if fluids needed to continue now that feedings are at goal   Was informed that orders are to follow to d/c all PO/ NGT routed meds in addition to IV fluids

## 2018-04-12 LAB
GLUCOSE SERPL-MCNC: 107 MG/DL (ref 65–140)
GLUCOSE SERPL-MCNC: 115 MG/DL (ref 65–140)
GLUCOSE SERPL-MCNC: 124 MG/DL (ref 65–140)
GLUCOSE SERPL-MCNC: 84 MG/DL (ref 65–140)

## 2018-04-12 PROCEDURE — 97116 GAIT TRAINING THERAPY: CPT

## 2018-04-12 PROCEDURE — 82948 REAGENT STRIP/BLOOD GLUCOSE: CPT

## 2018-04-12 PROCEDURE — C9113 INJ PANTOPRAZOLE SODIUM, VIA: HCPCS | Performed by: SURGERY

## 2018-04-12 RX ORDER — HEPARIN SODIUM 5000 [USP'U]/ML
INJECTION, SOLUTION INTRAVENOUS; SUBCUTANEOUS
Status: COMPLETED
Start: 2018-04-12 | End: 2018-04-12

## 2018-04-12 RX ADMIN — PANTOPRAZOLE SODIUM 40 MG: 40 INJECTION, POWDER, FOR SOLUTION INTRAVENOUS at 08:30

## 2018-04-12 RX ADMIN — ONDANSETRON 4 MG: 2 INJECTION INTRAMUSCULAR; INTRAVENOUS at 16:37

## 2018-04-12 RX ADMIN — HYDROMORPHONE HYDROCHLORIDE 0.5 MG: 1 INJECTION, SOLUTION INTRAMUSCULAR; INTRAVENOUS; SUBCUTANEOUS at 16:34

## 2018-04-12 RX ADMIN — HEPARIN SODIUM 5000 UNITS: 5000 INJECTION, SOLUTION INTRAVENOUS; SUBCUTANEOUS at 06:07

## 2018-04-12 RX ADMIN — HEPARIN SODIUM 5000 UNITS: 5000 INJECTION, SOLUTION INTRAVENOUS; SUBCUTANEOUS at 13:48

## 2018-04-12 RX ADMIN — HEPARIN SODIUM 5000 UNITS: 5000 INJECTION, SOLUTION INTRAVENOUS; SUBCUTANEOUS at 21:35

## 2018-04-12 NOTE — SOCIAL WORK
Spoke at length with pt who hopes to discharge tomorrow or 4/14/18  Discussed available HHC and she chose [facility] and referral sent  Also chose [facility] for tube feedings, referral sent  Awaiting responses to coordinate  Pt is currently on tube feeds continuously x20 hrs /day

## 2018-04-12 NOTE — PROGRESS NOTES
Notified by nursing that patient is "feeling bloated" after tube feeds increased  Asked nursing to maintain tube feeds, plan to hold only if patient vomits  Roberth Gallardo Surgery  PGY3

## 2018-04-12 NOTE — PHYSICAL THERAPY NOTE
Physical Therapy Progress Note    Pricila Dawkins, PT      04/12/18 5118   Pain Assessment   Pain Assessment 0-10   Pain Score 3   Pain Type Acute pain   Pain Location Abdomen   Pain Orientation Mid;Lower   Pain Descriptors Aching   Pain Frequency Intermittent   Pain Onset Gradual   Clinical Progression Gradually improving   Effect of Pain on Daily Activities guards   Patient's Stated Pain Goal No pain   Hospital Pain Intervention(s) Ambulation/increased activity; Distraction; Emotional support;Repositioned   Response to Interventions tolerated   Restrictions/Precautions   Weight Bearing Precautions Per Order No   Other Precautions Pain; Fall Risk  (tube feeds)   General   Chart Reviewed Yes   Response to Previous Treatment Patient with no complaints from previous session  Family/Caregiver Present Yes  (mom)   Cognition   Overall Cognitive Status WFL   Arousal/Participation Responsive; Cooperative   Attention Within functional limits   Orientation Level Oriented X4   Memory Within functional limits   Following Commands Follows one step commands without difficulty   Comments Pt appeated motivated - was pleasent and cooperative t/o session  Subjective   Subjective stated she was just about to get up and walk with her mom   Bed Mobility   Supine to Sit 4  Minimal assistance   Additional items Increased time required   Additional Comments positioned seated EOB at end of session   Transfers   Sit to Stand 5  Supervision   Additional items Increased time required   Stand to Sit 5  Supervision   Additional items Increased time required;Verbal cues   Stand pivot 5  Supervision   Ambulation/Elevation   Gait pattern Foward flexed  (increased bree)   Gait Assistance 5  Supervision   Assistive Device Other (Comment)  (IV pole)   Distance 450ft   Stair Management Assistance 5  Supervision   Additional items Verbal cues; Increased time required   Stair Management Technique One rail R;Reciprocal   Number of Stairs 4  (limited by lines)   Balance   Dynamic Standing Fair -   Ambulatory Fair -   Endurance Deficit   Endurance Deficit Yes   Endurance Deficit Description general deconditioning   Activity Tolerance   Activity Tolerance Patient limited by fatigue   Nurse Made Aware yes   Assessment   Prognosis Good   Problem List Decreased mobility; Decreased endurance;Decreased strength;Pain   Assessment Pt with noted improvement in mood and motivation this session  Amb increased distances as well as requested to perform stair training  Number of steps was LTD by lines however pt did report fatigue post 4 steps  Continue to recommend skilled therapy at this level with progress to home with family support and HPT once medically stable for D/C  Goals   Patient Goals noen stated   STG Expiration Date 04/22/18   Short Term Goal #1 In 7-10 days pt will negotiate FF of stairs mod I, amb with no device >400ft mod I and perform all bed mobility and transfers mod I   Treatment Day 7   Plan   Treatment/Interventions Functional transfer training;LE strengthening/ROM; Elevations; Therapeutic exercise; Endurance training;Bed mobility;Gait training;Spoke to nursing;Spoke to case management;OT   Progress Progressing toward goals   PT Frequency 5x/wk   Recommendation   Recommendation Home PT; Home with family support

## 2018-04-12 NOTE — PROGRESS NOTES
Drain Removal    Left perisplenic pigtail drain removed  Drain pigtail portion released under and skin and internal sutures cut however small portion of stitch held up in scar tissue and patient experience pain when she reflexively moved away from me during drain removal after the drain had been removed  Scar tissue around stitch tubing removed and drain pulled  Patient given 0 5mg hydromorphone IV x1 for pain      Shelby Sadler MD PGY-4  4:48 PM  04/12/18

## 2018-04-12 NOTE — PLAN OF CARE
Problem: PHYSICAL THERAPY ADULT  Goal: Performs mobility at highest level of function for planned discharge setting  See evaluation for individualized goals  Treatment/Interventions: Functional transfer training, LE strengthening/ROM, Elevations, Therapeutic exercise, Endurance training, Bed mobility, Gait training, Equipment eval/education, Spoke to nursing, Spoke to case management, OT  Equipment Recommended:  (TBD)       See flowsheet documentation for full assessment, interventions and recommendations  Outcome: Progressing  Prognosis: Good  Problem List: Decreased mobility, Decreased endurance, Decreased strength, Pain  Assessment: Pt with noted improvement in mood and motivation this session  Amb increased distances as well as requested to perform stair training  Number of steps was LTD by lines however pt did report fatigue post 4 steps  Continue to recommend skilled therapy at this level with progress to home with family support and HPT once medically stable for D/C  Barriers to Discharge: Inaccessible home environment     Recommendation: Home PT, Home with family support          See flowsheet documentation for full assessment

## 2018-04-12 NOTE — PROGRESS NOTES
Pt  With c/o abd  Bloating/fullness  Tub feeds increased earlier today  Brown Solorio with white sx  Notified- per Brown Solorio we are to continue tube feedings, only stop feeds if pt  Vomits

## 2018-04-12 NOTE — CONSULTS
Cyclic TF recs: Jevity 1 2 @ 84ml/hr x 20 hours, continue w/ same flush order  IF tolerated and wish to progress to 16 hour TF, rate would be Jevity 1 2 @ 105ml/hr x 16 hours  Pt is being followed by Nutrition Services at a Moderate Complexity of Care

## 2018-04-12 NOTE — PROGRESS NOTES
Spoke with Denice with white sx   OK for nursing to flush keofed with 60cc luke warm tap water q 4hr for tube maintenance

## 2018-04-12 NOTE — PROGRESS NOTES
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    Plan:  1  Foregut perforation   - post-pyloric keofed in position   - long discussion about regaining weight and improving nutrition to improve chances of fistula closure vs better outcome with possible revision   - will convert to nocturnal feeds today    2  DVT Prophylaxis   - SQH   - SCDs    3  Disposition   - possible discharge tomorrow    Patsy Wolfe MD PGY-4  6:17 AM  04/12/18      Subjective:  Goal tube feeds  Does not like water flushes  Wants to go home  Objective:  Patient Vitals for the past 24 hrs:   BP Temp Temp src Pulse Resp SpO2   04/12/18 0011 112/75 98 2 °F (36 8 °C) Oral 77 18 97 %   04/11/18 1600 - - - - - 98 %   04/11/18 1503 116/66 99 7 °F (37 6 °C) Oral 74 18 98 %   04/11/18 0800 - - - - - 97 %   04/11/18 0700 116/70 97 9 °F (36 6 °C) Oral 84 20 97 %          Diet Orders            Start     Ordered    04/11/18 0001  Diet Enteral/Parenteral; Tube Feeding No Oral Diet; Jevity 1 2 Claude; 70; 110; Water; Every 4 hours  Diet effective midnight     Comments:  Start tube feeds at 20mL / hr and increase by 20mL / hr every 4 hours until goal of 70 mL / hr is reached   Question Answer Comment   Diet Type Enteral/Parenteral    Enteral/Parenteral Tube Feeding No Oral Diet    Tube Feeding Formula: Jevity 1 2 Claude    Tube Feeding Continuous rate (mL/hr): 70    Tube Feeding water flush (mL): 110    Water Flush type: Water    Water flush frequency: Every 4 hours    RD to adjust diet per protocol?  No        04/10/18 1837        Intake/Output Summary (Last 24 hours) at 04/12/18 0617  Last data filed at 04/12/18 0401   Gross per 24 hour   Intake             3542 ml   Output             2115 ml   Net             1427 ml   UOP 2 1  NGT 0  ADENIKE 50 anterior serosanguinous  ADENIKE 10 left serosanguinous    Physical Exam:  General: NAD, keofed in position  Cardiovascular: RRR  Respiratory: breath sounds b/l  Abdomen: soft, ND, ND incision midline w/o wound mgr with no wound mgr  Extremities: no edema    Medications:    Current Facility-Administered Medications:  acetaminophen 650 mg Rectal Once PRN Julianne Benton MD   bisacodyl 10 mg Rectal Daily PRN Harvey Sanchez PA-C   heparin (porcine) 5,000 Units Subcutaneous [facility] Justino Reis MD   insulin lispro 1-5 Units Subcutaneous Q6H [facility] Maria Luz Mathur PA-C   ketorolac 15 mg Intravenous Q6H PRN Harvey Sanchez PA-C   LORazepam 0 5 mg Intravenous Q6H PRN Angel Russell MD   metoclopramide 10 mg Intravenous Q6H PRN Julianne Benton MD   ondansetron 4 mg Intravenous Q6H PRN Justino Reis MD   pancrelipase (Lip-Prot-Amyl) 24,000 Units Oral Once Ardith Schilder   pantoprazole 40 mg Intravenous Q24H Jose Antonio Caballero MD      acetaminophen 650 mg Once PRN   bisacodyl 10 mg Daily PRN   ketorolac 15 mg Q6H PRN   LORazepam 0 5 mg Q6H PRN   metoclopramide 10 mg Q6H PRN   ondansetron 4 mg Q6H PRN     Laboratory results:   CBC: No results found for: WBC, HGB, HCT, MCV, PLT, ADJUSTEDWBC, MCH, MCHC, RDW, MPV, NRBC, CMP: No results found for: NA, K, CL, CO2, ANIONGAP, BUN, CREATININE, GLUCOSE, CALCIUM, AST, ALT, ALKPHOS, PROT, ALBUMIN, BILITOT, EGFR, Coagulation: No results found for: PT, INR, APTT, Urinalysis: No results found for: COLORU, CLARITYU, SPECGRAV, PHUR, LEUKOCYTESUR, NITRITE, PROTEINUA, GLUCOSEU, KETONESU, BILIRUBINUR, BLOODU, Amylase: No results found for: AMYLASE, Lipase: No results found for: LIPASE    VTE Pharmacologic Prophylaxis: Heparin  VTE Mechanical Prophylaxis: sequential compression device

## 2018-04-13 ENCOUNTER — APPOINTMENT (INPATIENT)
Dept: RADIOLOGY | Facility: HOSPITAL | Age: 43
DRG: 252 | End: 2018-04-13
Payer: COMMERCIAL

## 2018-04-13 VITALS
RESPIRATION RATE: 20 BRPM | BODY MASS INDEX: 20.83 KG/M2 | TEMPERATURE: 97.8 F | DIASTOLIC BLOOD PRESSURE: 72 MMHG | HEIGHT: 64 IN | OXYGEN SATURATION: 98 % | HEART RATE: 63 BPM | WEIGHT: 122 LBS | SYSTOLIC BLOOD PRESSURE: 128 MMHG

## 2018-04-13 LAB
GLUCOSE SERPL-MCNC: 113 MG/DL (ref 65–140)
GLUCOSE SERPL-MCNC: 82 MG/DL (ref 65–140)
GLUCOSE SERPL-MCNC: 94 MG/DL (ref 65–140)

## 2018-04-13 PROCEDURE — C9113 INJ PANTOPRAZOLE SODIUM, VIA: HCPCS | Performed by: SURGERY

## 2018-04-13 PROCEDURE — 99238 HOSP IP/OBS DSCHRG MGMT 30/<: CPT | Performed by: THORACIC SURGERY (CARDIOTHORACIC VASCULAR SURGERY)

## 2018-04-13 PROCEDURE — 82948 REAGENT STRIP/BLOOD GLUCOSE: CPT

## 2018-04-13 PROCEDURE — 74177 CT ABD & PELVIS W/CONTRAST: CPT

## 2018-04-13 RX ADMIN — IOHEXOL 100 ML: 350 INJECTION, SOLUTION INTRAVENOUS at 11:05

## 2018-04-13 RX ADMIN — HEPARIN SODIUM 5000 UNITS: 5000 INJECTION, SOLUTION INTRAVENOUS; SUBCUTANEOUS at 05:55

## 2018-04-13 RX ADMIN — PANTOPRAZOLE SODIUM 40 MG: 40 INJECTION, POWDER, FOR SOLUTION INTRAVENOUS at 08:14

## 2018-04-13 NOTE — PLAN OF CARE
Present in room with Dr Priti Mitchell to discuss plan of care to discharge home later today with keofed tube and patient's ability to flush NG tube  CT of abdomen prior to discharge  Questions and concerns addressed from patient at this time

## 2018-04-13 NOTE — DISCHARGE INSTRUCTIONS
1  May shower, no tub baths  2  Flush feeding tube with 20 mL of coke, with at least one time being while off tube feeds  3  Obtain blood work: CBC, CMP, and prealbumin within 3 days prior to follow up appointment with Dr Lang De La Garza  4  Drain hyun drain daily and record outputs   Bring recorded amounts to follow up visit with Dr Lang De La Garza

## 2018-04-13 NOTE — SOCIAL WORK
Home today, has [facility] visit at 1900 to restart TFs  [facility] also aware  Maylin Stokes is transporting  Pt with good understanding

## 2018-04-13 NOTE — DISCHARGE SUMMARY
Discharge Summary - John Marr 36 y o  female MRN: [id number]    Unit/Bed#: [facility] 427-01 Encounter: [id number]    Admission Date: 3/22/2018     Discharge Date: 04/13/18    Admitting Diagnosis: Gastric perforation ([address] ) [K25 5]    Secondary Diagnosis:   Past Medical History:   Diagnosis Date    Brain condition     Pseudotumor Cerebri     Migraine     Obesity     Papilledema, both eyes     Presence of lumboperitoneal shunt     Resolved: Sep 20, 2017    Rotator cuff tendinitis     Resolved: Aug 23, 2017    Visual field defect        Discharge Diagnosis: Same    Procedures Performed: Procedure(s):  ESOPHAGOGASTRODUODENOSCOPY (EGD) with Kaofed placement    Consults: Gastroenterology    Hospital Course: 28-year-old female known to service with history of injury to Gastric sleeve near the angle of Hiss during attempted hiatal hernia repair at an outside hospital  The same hospital attempted stenting of which we then re-performed which has always has had good endoscopic results but persistent leakage  Leakage has been controlled with drains  She did develop a leak from her midline incision which had a wound manager applied to it  She was in rehab with a NJ tube but it was removed and she was placed on TPN  She presented afebrile and a CT scan was obtained to better define anatomy  This scan revealed a persistent leak from her gastric perforation and a tract consistent with enterocutaneous fistula  GI was consulted to assist with possible PEJ placement, however this was not possible  Eventually, GI placed an [place] tube for tube feeds  GI also injected Botox into the antrum of her gastric sleeve where there was scar tissue to attempt to reduce pressure within the lumen of the sleeve and a biopsy of this tissue was negative for malignancy or H  pylori  She was started on tube feeds and eventually tolerated goal tube feeds  At this point, she was changed to cycled feeds   Her left buzz-splenic drain, which had been placed for an abscess at a prior admission, was removed  Her enterocutaneous fistula was no longer draining and she did not require wound manager  Her anterior drain near the perforation remained in place to decompress the perforation  She was discharged to home with NJ tube for feeds and anterior drain with plans to follow up with Dr Cj Vaughn to discuss future plans including stent removal if the perforation seals, or possible surgical revision with bariatric surgery  Disposition: home  Call physician for temperature over 101, wound redness or discharge, vomiting, or intolerance to diet  Discharge Medications:  See after visit summary for reconciled discharge medications provided to patient and family  This text is generated with voice recognition software  There may be translation, syntax,  or grammatical errors  If you have any questions, please contact the dictating provider

## 2018-04-16 ENCOUNTER — TELEPHONE (OUTPATIENT)
Dept: CARDIAC SURGERY | Facility: CLINIC | Age: 43
End: 2018-04-16

## 2018-04-16 NOTE — TELEPHONE ENCOUNTER
Eileen Murrell from [facility] called regarding pt  Pt was d/c'd to home 04/13  Pt has been having loose stools and severe stomach cramping  Pt had a hiatal hernia surgery  Is there anything she can do? Please call either Eileen Murrell or the pt for further instructions

## 2018-04-18 ENCOUNTER — TELEPHONE (OUTPATIENT)
Dept: CARDIAC SURGERY | Facility: CLINIC | Age: 43
End: 2018-04-18

## 2018-04-18 NOTE — TELEPHONE ENCOUNTER
Patient is having abdominal cramping and frequent bowel movements whenever she is on tube feeds  For the 4 hours that she is off tube feeds, she is not having these symptoms  I spoke with Abelardo Mireles at [employer] and they will go out this afternoon and switch her formula to osmolite 1 2, to see if that helps   Patient aware

## 2018-04-25 ENCOUNTER — APPOINTMENT (EMERGENCY)
Dept: RADIOLOGY | Facility: HOSPITAL | Age: 43
DRG: 951 | End: 2018-04-25
Payer: COMMERCIAL

## 2018-04-25 ENCOUNTER — APPOINTMENT (OUTPATIENT)
Dept: RADIOLOGY | Facility: HOSPITAL | Age: 43
DRG: 951 | End: 2018-04-25
Payer: COMMERCIAL

## 2018-04-25 ENCOUNTER — HOSPITAL ENCOUNTER (INPATIENT)
Facility: HOSPITAL | Age: 43
LOS: 1 days | Discharge: HOME HEALTH | DRG: 951 | End: 2018-04-30
Attending: EMERGENCY MEDICINE | Admitting: SURGERY
Payer: COMMERCIAL

## 2018-04-25 DIAGNOSIS — K91.89 GASTRIC LEAK: ICD-10-CM

## 2018-04-25 DIAGNOSIS — T81.30XD ABDOMINAL WOUND DEHISCENCE, SUBSEQUENT ENCOUNTER: ICD-10-CM

## 2018-04-25 DIAGNOSIS — K31.6 GASTROCUTANEOUS FISTULA: ICD-10-CM

## 2018-04-25 DIAGNOSIS — R10.9 ABDOMINAL PAIN: Primary | ICD-10-CM

## 2018-04-25 LAB
ALBUMIN SERPL BCP-MCNC: 2.7 G/DL (ref 3.5–5)
ALP SERPL-CCNC: 61 U/L (ref 46–116)
ALT SERPL W P-5'-P-CCNC: 46 U/L (ref 12–78)
ANION GAP SERPL CALCULATED.3IONS-SCNC: 7 MMOL/L (ref 4–13)
AST SERPL W P-5'-P-CCNC: 40 U/L (ref 5–45)
BASOPHILS # BLD AUTO: 0.06 THOUSANDS/ΜL (ref 0–0.1)
BASOPHILS NFR BLD AUTO: 1 % (ref 0–1)
BILIRUB SERPL-MCNC: 0.35 MG/DL (ref 0.2–1)
BUN SERPL-MCNC: 14 MG/DL (ref 5–25)
CALCIUM SERPL-MCNC: 9 MG/DL (ref 8.3–10.1)
CHLORIDE SERPL-SCNC: 101 MMOL/L (ref 100–108)
CO2 SERPL-SCNC: 28 MMOL/L (ref 21–32)
CREAT SERPL-MCNC: 0.39 MG/DL (ref 0.6–1.3)
EOSINOPHIL # BLD AUTO: 0.28 THOUSAND/ΜL (ref 0–0.61)
EOSINOPHIL NFR BLD AUTO: 3 % (ref 0–6)
ERYTHROCYTE [DISTWIDTH] IN BLOOD BY AUTOMATED COUNT: 15.6 % (ref 11.6–15.1)
GFR SERPL CREATININE-BSD FRML MDRD: 129 ML/MIN/1.73SQ M
GLUCOSE SERPL-MCNC: 95 MG/DL (ref 65–140)
HCT VFR BLD AUTO: 33.4 % (ref 34.8–46.1)
HGB BLD-MCNC: 10.5 G/DL (ref 11.5–15.4)
LACTATE SERPL-SCNC: 0.7 MMOL/L (ref 0.5–2)
LIPASE SERPL-CCNC: 258 U/L (ref 73–393)
LYMPHOCYTES # BLD AUTO: 1.66 THOUSANDS/ΜL (ref 0.6–4.47)
LYMPHOCYTES NFR BLD AUTO: 16 % (ref 14–44)
MCH RBC QN AUTO: 26.1 PG (ref 26.8–34.3)
MCHC RBC AUTO-ENTMCNC: 31.4 G/DL (ref 31.4–37.4)
MCV RBC AUTO: 83 FL (ref 82–98)
MONOCYTES # BLD AUTO: 0.96 THOUSAND/ΜL (ref 0.17–1.22)
MONOCYTES NFR BLD AUTO: 10 % (ref 4–12)
NEUTROPHILS # BLD AUTO: 7.17 THOUSANDS/ΜL (ref 1.85–7.62)
NEUTS SEG NFR BLD AUTO: 70 % (ref 43–75)
NRBC BLD AUTO-RTO: 0 /100 WBCS
PLATELET # BLD AUTO: 289 THOUSANDS/UL (ref 149–390)
PMV BLD AUTO: 9.5 FL (ref 8.9–12.7)
POTASSIUM SERPL-SCNC: 4.5 MMOL/L (ref 3.5–5.3)
PROCALCITONIN SERPL-MCNC: <0.05 NG/ML
PROT SERPL-MCNC: 6.8 G/DL (ref 6.4–8.2)
RBC # BLD AUTO: 4.02 MILLION/UL (ref 3.81–5.12)
SODIUM SERPL-SCNC: 136 MMOL/L (ref 136–145)
WBC # BLD AUTO: 10.15 THOUSAND/UL (ref 4.31–10.16)

## 2018-04-25 PROCEDURE — BW111ZZ FLUOROSCOPY OF ABDOMEN AND PELVIS USING LOW OSMOLAR CONTRAST: ICD-10-PCS | Performed by: RADIOLOGY

## 2018-04-25 PROCEDURE — 83690 ASSAY OF LIPASE: CPT | Performed by: EMERGENCY MEDICINE

## 2018-04-25 PROCEDURE — 76080 X-RAY EXAM OF FISTULA: CPT

## 2018-04-25 PROCEDURE — 74177 CT ABD & PELVIS W/CONTRAST: CPT

## 2018-04-25 PROCEDURE — 0J980ZZ DRAINAGE OF ABDOMEN SUBCUTANEOUS TISSUE AND FASCIA, OPEN APPROACH: ICD-10-PCS | Performed by: SURGERY

## 2018-04-25 PROCEDURE — 99285 EMERGENCY DEPT VISIT HI MDM: CPT

## 2018-04-25 PROCEDURE — 99024 POSTOP FOLLOW-UP VISIT: CPT | Performed by: SURGERY

## 2018-04-25 PROCEDURE — 99243 OFF/OP CNSLTJ NEW/EST LOW 30: CPT | Performed by: THORACIC SURGERY (CARDIOTHORACIC VASCULAR SURGERY)

## 2018-04-25 PROCEDURE — 96374 THER/PROPH/DIAG INJ IV PUSH: CPT

## 2018-04-25 PROCEDURE — 76080 X-RAY EXAM OF FISTULA: CPT | Performed by: RADIOLOGY

## 2018-04-25 PROCEDURE — 85025 COMPLETE CBC W/AUTO DIFF WBC: CPT | Performed by: EMERGENCY MEDICINE

## 2018-04-25 PROCEDURE — 49424 ASSESS CYST CONTRAST INJECT: CPT | Performed by: RADIOLOGY

## 2018-04-25 PROCEDURE — 10060 I&D ABSCESS SIMPLE/SINGLE: CPT | Performed by: SURGERY

## 2018-04-25 PROCEDURE — 96376 TX/PRO/DX INJ SAME DRUG ADON: CPT

## 2018-04-25 PROCEDURE — 84145 PROCALCITONIN (PCT): CPT | Performed by: EMERGENCY MEDICINE

## 2018-04-25 PROCEDURE — 36415 COLL VENOUS BLD VENIPUNCTURE: CPT | Performed by: EMERGENCY MEDICINE

## 2018-04-25 PROCEDURE — 80053 COMPREHEN METABOLIC PANEL: CPT | Performed by: EMERGENCY MEDICINE

## 2018-04-25 PROCEDURE — 96375 TX/PRO/DX INJ NEW DRUG ADDON: CPT

## 2018-04-25 PROCEDURE — 49424 ASSESS CYST CONTRAST INJECT: CPT

## 2018-04-25 PROCEDURE — 83605 ASSAY OF LACTIC ACID: CPT | Performed by: EMERGENCY MEDICINE

## 2018-04-25 PROCEDURE — 96361 HYDRATE IV INFUSION ADD-ON: CPT

## 2018-04-25 RX ORDER — OXYCODONE HCL 5 MG/5 ML
5 SOLUTION, ORAL ORAL EVERY 4 HOURS PRN
Status: DISCONTINUED | OUTPATIENT
Start: 2018-04-25 | End: 2018-04-25

## 2018-04-25 RX ORDER — ONDANSETRON 2 MG/ML
4 INJECTION INTRAMUSCULAR; INTRAVENOUS EVERY 6 HOURS PRN
Status: DISCONTINUED | OUTPATIENT
Start: 2018-04-25 | End: 2018-04-30 | Stop reason: HOSPADM

## 2018-04-25 RX ORDER — OXYCODONE HCL 5 MG/5 ML
10 SOLUTION, ORAL ORAL EVERY 4 HOURS PRN
Status: DISCONTINUED | OUTPATIENT
Start: 2018-04-25 | End: 2018-04-25

## 2018-04-25 RX ORDER — HEPARIN SODIUM 5000 [USP'U]/ML
5000 INJECTION, SOLUTION INTRAVENOUS; SUBCUTANEOUS EVERY 8 HOURS SCHEDULED
Status: DISCONTINUED | OUTPATIENT
Start: 2018-04-25 | End: 2018-04-30 | Stop reason: HOSPADM

## 2018-04-25 RX ORDER — DEXTROSE, SODIUM CHLORIDE, AND POTASSIUM CHLORIDE 5; .45; .15 G/100ML; G/100ML; G/100ML
100 INJECTION INTRAVENOUS CONTINUOUS
Status: DISCONTINUED | OUTPATIENT
Start: 2018-04-25 | End: 2018-04-26

## 2018-04-25 RX ORDER — LIDOCAINE HYDROCHLORIDE 10 MG/ML
20 INJECTION, SOLUTION EPIDURAL; INFILTRATION; INTRACAUDAL; PERINEURAL ONCE
Status: COMPLETED | OUTPATIENT
Start: 2018-04-25 | End: 2018-04-25

## 2018-04-25 RX ORDER — FENTANYL CITRATE 50 UG/ML
50 INJECTION, SOLUTION INTRAMUSCULAR; INTRAVENOUS ONCE
Status: COMPLETED | OUTPATIENT
Start: 2018-04-25 | End: 2018-04-25

## 2018-04-25 RX ORDER — SODIUM CHLORIDE 9 MG/ML
100 INJECTION, SOLUTION INTRAVENOUS CONTINUOUS
Status: DISCONTINUED | OUTPATIENT
Start: 2018-04-25 | End: 2018-04-25

## 2018-04-25 RX ORDER — ONDANSETRON 2 MG/ML
INJECTION INTRAMUSCULAR; INTRAVENOUS
Status: DISPENSED
Start: 2018-04-25 | End: 2018-04-25

## 2018-04-25 RX ORDER — ONDANSETRON 2 MG/ML
4 INJECTION INTRAMUSCULAR; INTRAVENOUS ONCE
Status: COMPLETED | OUTPATIENT
Start: 2018-04-25 | End: 2018-04-25

## 2018-04-25 RX ORDER — LIDOCAINE HYDROCHLORIDE 10 MG/ML
INJECTION, SOLUTION EPIDURAL; INFILTRATION; INTRACAUDAL; PERINEURAL
Status: DISPENSED
Start: 2018-04-25 | End: 2018-04-26

## 2018-04-25 RX ADMIN — HEPARIN SODIUM 5000 UNITS: 5000 INJECTION, SOLUTION INTRAVENOUS; SUBCUTANEOUS at 21:31

## 2018-04-25 RX ADMIN — HYDROMORPHONE HYDROCHLORIDE 1 MG: 1 INJECTION, SOLUTION INTRAMUSCULAR; INTRAVENOUS; SUBCUTANEOUS at 05:33

## 2018-04-25 RX ADMIN — ONDANSETRON 4 MG: 2 INJECTION INTRAMUSCULAR; INTRAVENOUS at 02:12

## 2018-04-25 RX ADMIN — SODIUM CHLORIDE 100 ML/HR: 0.9 INJECTION, SOLUTION INTRAVENOUS at 07:17

## 2018-04-25 RX ADMIN — HYDROMORPHONE HYDROCHLORIDE 1 MG: 1 INJECTION, SOLUTION INTRAMUSCULAR; INTRAVENOUS; SUBCUTANEOUS at 17:09

## 2018-04-25 RX ADMIN — ONDANSETRON 4 MG: 2 INJECTION INTRAMUSCULAR; INTRAVENOUS at 05:33

## 2018-04-25 RX ADMIN — DEXTROSE, SODIUM CHLORIDE, AND POTASSIUM CHLORIDE 100 ML/HR: 5; .45; .15 INJECTION INTRAVENOUS at 13:34

## 2018-04-25 RX ADMIN — IOHEXOL 100 ML: 350 INJECTION, SOLUTION INTRAVENOUS at 03:41

## 2018-04-25 RX ADMIN — HEPARIN SODIUM 5000 UNITS: 5000 INJECTION, SOLUTION INTRAVENOUS; SUBCUTANEOUS at 07:17

## 2018-04-25 RX ADMIN — HYDROMORPHONE HYDROCHLORIDE 0.5 MG: 1 INJECTION, SOLUTION INTRAMUSCULAR; INTRAVENOUS; SUBCUTANEOUS at 02:14

## 2018-04-25 RX ADMIN — SODIUM CHLORIDE 1000 ML: 0.9 INJECTION, SOLUTION INTRAVENOUS at 02:10

## 2018-04-25 RX ADMIN — FENTANYL CITRATE 50 MCG: 50 INJECTION, SOLUTION INTRAMUSCULAR; INTRAVENOUS at 03:20

## 2018-04-25 RX ADMIN — DEXTROSE, SODIUM CHLORIDE, AND POTASSIUM CHLORIDE 100 ML/HR: 5; .45; .15 INJECTION INTRAVENOUS at 22:07

## 2018-04-25 RX ADMIN — HEPARIN SODIUM 5000 UNITS: 5000 INJECTION, SOLUTION INTRAVENOUS; SUBCUTANEOUS at 13:31

## 2018-04-25 RX ADMIN — HYDROMORPHONE HYDROCHLORIDE 1 MG: 1 INJECTION, SOLUTION INTRAMUSCULAR; INTRAVENOUS; SUBCUTANEOUS at 12:22

## 2018-04-25 RX ADMIN — HYDROMORPHONE HYDROCHLORIDE 1 MG: 1 INJECTION, SOLUTION INTRAMUSCULAR; INTRAVENOUS; SUBCUTANEOUS at 21:31

## 2018-04-25 RX ADMIN — LIDOCAINE HYDROCHLORIDE 20 ML: 10 INJECTION, SOLUTION EPIDURAL; INFILTRATION; INTRACAUDAL; PERINEURAL at 12:36

## 2018-04-25 RX ADMIN — IOHEXOL 4 ML: 300 INJECTION, SOLUTION INTRAVENOUS at 16:37

## 2018-04-25 NOTE — ED PROVIDER NOTES
History  Chief Complaint   Patient presents with    Abdominal Pain     severe abdominal pain for 2 days, has a perforated stomach, pt is getting tube feeds and abdomen is "swelling", no fever or chills, no N/V     44-year-old female with history of gastric perforation and septic shock post hiatal hernia repair at [facility] in January presents for evaluation of abdominal pain for the last 2 days  Patient had hernia repair at [facility] in January after that was brought for evaluation to [facility] with septic shock secondary to gastric perforation required multiple surgeries and prolonged ICU stay  She was discharged from Dr Light's service on 4/13 with an NG tube, epigastric ADENIKE drain  A since Saturday there were 2 instances when the NG tube seemed blocks to her  when he tried giving her the nutrition, he spoke to Dr Zhen Wright office and was able to trouble shoot the tube and resume feeds  However 2 days ago the patient noted epigastric abdominal pain which has been worsening it was sharp, constant without any relieving or exacerbating factors  She also notice that there was a small lump in the middle of her epigastric region over the area of the incision which was very tender to palpation in seem to be enlarging  There was mild erythema but no drainage from the area  The  also noted that the ADENIKE drain output has been increasing and doubled from 20s to 40s mL daily  The drain remained serous without any blood  The patient denies any fevers, chills, nausea, vomiting, diarrhea  She has not had the symptoms since leaving the hospital and did not receive any medications for pain control at home              None       Past Medical History:   Diagnosis Date    Brain condition     Pseudotumor Cerebri     Migraine     Obesity     Papilledema, both eyes     Presence of lumboperitoneal shunt     Resolved: Sep 20, 2017    Rotator cuff tendinitis     Resolved: Aug 23, 2017   Eva Saxena Visual field defect        Past Surgical History:   Procedure Laterality Date    CSF SHUNT      LP shunt - 2015 -  shunt - 2017    ESOPHAGOGASTRODUODENOSCOPY N/A 2/23/2018    Procedure: ESOPHAGOGASTRODUODENOSCOPY (EGD) WITH ESOPHAGEAL STENT PLACEMENT;  Surgeon: Belia Marshall MD;  [facility];  Service: Thoracic    ESOPHAGOGASTRODUODENOSCOPY N/A 2/23/2018    Procedure: ESOPHAGOGASTRODUODENOSCOPY (EGD) WITH REMOVAL ESOPHAGEAL STENT  AND REPLACEMENT WITH 23mm X155mm 1111 [address] [place] [address],[address];  Surgeon: Belia Marshall MD;  [facility];  Service: Thoracic    ESOPHAGOGASTRODUODENOSCOPY N/A 3/28/2018    Procedure: ESOPHAGOGASTRODUODENOSCOPY (EGD) with PEJ placement ;  Surgeon: Kush Cevallos MD;  [facility]; Service: Gastroenterology    ESOPHAGOGASTRODUODENOSCOPY N/A 4/5/2018    Procedure: ESOPHAGOGASTRODUODENOSCOPY (EGD) with botox injection and kaofed placement;  Surgeon: Luly Hugo DO;  [facility]; Service: Gastroenterology    ESOPHAGOGASTRODUODENOSCOPY N/A 4/10/2018    Procedure: ESOPHAGOGASTRODUODENOSCOPY (EGD) with Kaofed placement;  Surgeon: Marlene Fox MD;  [facility]; Service: Gastroenterology    ESOPHAGOSCOPY WITH STENT INSERTION N/A 1/24/2018    Procedure: INSERTION STENT ESOPHAGEAL;  Surgeon: Ricky Suarez MD;  [facility]; Service: Gastroenterology    EYE SURGERY      for "crossed eyed" (in 2nd grade)     GASTRIC BYPASS  2016    HERNIA REPAIR      HYSTERECTOMY      LAPAROTOMY N/A 1/25/2018    Procedure: Exploratory Laparotomy, wash out,placement of drains, placement of NG feeding tube ;   Surgeon: Sergio Cosby DO;  [facility];  Service: General    MS CREATE SHUNT:VENTRIC-PERITONEAL Right 5/31/2017    Procedure: IMAGE GUIDED CORONAL PLACEMENT OF PROGRAMABLE VENTRICULAR-PERITONEAL SHUNT, REMOVAL OF LP SHUNT ;  Surgeon: Dona Andrews MD;  [facility];  Service: Neurosurgery    MS Dionte Nicholas CSF SHUNT,W/O REPLACE Right 2/5/2018    Procedure: Removal of  shunt;  Surgeon: Eliza Haney MD;  [facility];  Service: Neurosurgery    ND REPLACEMENT/REVISION,CSF SHUNT Right 1/25/2018    Procedure: Externalization of right-sided SHUNT VENTRICULAR-PERITONEAL in anterior chest wall ribs two and three level  ;  Surgeon: Anthony Galeano MD;  [facility];  Service: Neurosurgery       Family History   Problem Relation Age of Onset    No Known Problems Mother     No Known Problems Father     Thyroid cancer Brother     Colon cancer Maternal Grandfather     Cancer Paternal Grandmother     Cancer Paternal Grandfather      I have reviewed and agree with the history as documented  Social History   Substance Use Topics    Smoking status: Never Smoker    Smokeless tobacco: Never Used    Alcohol use No        Review of Systems   Constitutional: Negative for appetite change and fever  HENT: Negative for rhinorrhea and sore throat  Eyes: Negative for photophobia and visual disturbance  Respiratory: Negative for cough, chest tightness and wheezing  Cardiovascular: Negative for chest pain, palpitations and leg swelling  Gastrointestinal: Positive for abdominal distention and abdominal pain  Negative for blood in stool, constipation, diarrhea, nausea and vomiting  Genitourinary: Negative for dysuria, flank pain, frequency, hematuria and urgency  Musculoskeletal: Negative for back pain  Skin: Negative for rash  Neurological: Negative for dizziness, weakness and headaches  All other systems reviewed and are negative        Physical Exam  ED Triage Vitals [04/25/18 0131]   Temperature Pulse Respirations Blood Pressure SpO2   98 3 °F (36 8 °C) 88 18 108/62 97 %      Temp Source Heart Rate Source Patient Position - Orthostatic VS BP Location FiO2 (%)   Oral Monitor Sitting Left arm --      Pain Score       8           Orthostatic Vital Signs  Vitals:    04/25/18 1230 04/25/18 1313 04/25/18 1659 04/25/18 2336   BP: 100/59 95/52 108/53 96/52   Pulse: 76 74 74 91   Patient Position - Orthostatic VS:  Lying  Lying       Physical Exam   Constitutional: She is oriented to person, place, and time  She appears well-developed and well-nourished  Thin female in no acute respiratory distress   HENT:   Head: Normocephalic and atraumatic    keofed tube in left nare, currently clamped  Eyes: EOM are normal  Pupils are equal, round, and reactive to light  Neck: Normal range of motion  Neck supple  Cardiovascular: Normal rate and regular rhythm  Exam reveals no gallop and no friction rub  No murmur heard  Pulmonary/Chest: Effort normal  She has no wheezes  She has no rales  She exhibits no tenderness  Abdominal: Soft  She exhibits distension  She exhibits no mass  There is tenderness  There is no rebound and no guarding  Tenderness to palpation over the epigastric region, there is a small lump that is warm and minimally erythematous in close proximity to midline abdominal scar  The scar appears to be healing well  There is also a ADENIKE drain in the epigastric region with serous output   Neurological: She is alert and oriented to person, place, and time  Skin: Skin is warm and dry  Psychiatric: She has a normal mood and affect  Nursing note and vitals reviewed        ED Medications  Medications   ondansetron (ZOFRAN) injection 4 mg (not administered)   heparin (porcine) subcutaneous injection 5,000 Units ( Subcutaneous Canceled Entry 4/26/18 0600)   HYDROmorphone (DILAUDID) injection 0 5 mg (not administered)   HYDROmorphone (DILAUDID) injection 1 mg (1 mg Intravenous Given 4/26/18 1635)   lidocaine (PF) (XYLOCAINE-MPF) 1 % injection **AcuDose Override Pull** (not administered)   dextrose 5 % and sodium chloride 0 45 % with KCl 20 mEq/L infusion (100 mL/hr Intravenous New Bag 4/25/18 8101)   HYDROmorphone (DILAUDID) injection 0 5 mg (0 5 mg Intravenous Given 4/25/18 0844)   ondansetron [facility]) injection 4 mg (4 mg Intravenous Given 4/25/18 0212)   sodium chloride 0 9 % bolus 1,000 mL (0 mL Intravenous Stopped 4/25/18 0410)   fentanyl citrate (PF) 100 MCG/2ML 50 mcg (50 mcg Intravenous Given 4/25/18 0320)   iohexol (OMNIPAQUE) 350 MG/ML injection (MULTI-DOSE) 100 mL (100 mL Intravenous Given 4/25/18 0341)   HYDROmorphone (DILAUDID) injection 1 mg (1 mg Intravenous Given 4/25/18 0533)   ondansetron (ZOFRAN) injection 4 mg (4 mg Intravenous Given 4/25/18 0533)   lidocaine (PF) (XYLOCAINE-MPF) 1 % injection 20 mL (20 mL Infiltration Given 4/25/18 1236)   iohexol (OMNIPAQUE) 300 mg/mL injection 50 mL (4 mL Intravenous Given 4/25/18 1637)       Diagnostic Studies  Results Reviewed     Procedure Component Value Units Date/Time    CBC and differential [[id number]] Collected:  04/26/18 0551    Lab Status:  No result Specimen:  Blood from Arm, Left     Basic metabolic panel [[id number]] Collected:  04/26/18 0551    Lab Status:  No result Specimen:  Blood from Arm, Left     Prealbumin [[id number]] Collected:  04/26/18 0551    Lab Status:  No result Specimen:  Blood from Arm, Left     Magnesium [[id number]] Collected:  04/26/18 0551    Lab Status:  No result Specimen:  Blood from Arm, Left     Trauma tubes on hold [[id number]] Collected:  04/25/18 0645    Lab Status: In process Specimen:  Blood Updated:  04/25/18 0649    Narrative: The following orders were created for panel order Trauma tubes on hold  Procedure                               Abnormality         Status                     ---------                               -----------         ------                     Lyubov Staples Top 7ml on XZBN[[id number]]                          In process                   Please view results for these tests on the individual orders      Procalcitonin [[id number]]  (Normal) Collected:  04/25/18 0226    Lab Status:  Final result Specimen:  Blood from Arm, Right Updated:  04/25/18 0305     Procalcitonin <0 05 ng/ml Comprehensive metabolic panel [[id number]]  (Abnormal) Collected:  04/25/18 0211    Lab Status:  Final result Specimen:  Blood from Arm, Right Updated:  04/25/18 0242     Sodium 136 mmol/L      Potassium 4 5 mmol/L      Chloride 101 mmol/L      CO2 28 mmol/L      Anion Gap 7 mmol/L      BUN 14 mg/dL      Creatinine 0 39 (L) mg/dL      Glucose 95 mg/dL      Calcium 9 0 mg/dL      AST 40 U/L      ALT 46 U/L      Alkaline Phosphatase 61 U/L      Total Protein 6 8 g/dL      Albumin 2 7 (L) g/dL      Total Bilirubin 0 35 mg/dL      eGFR 129 ml/min/1 73sq m     Narrative:         National Kidney Disease Education Program recommendations are as follows:  GFR calculation is accurate only with a steady state creatinine  Chronic Kidney disease less than 60 ml/min/1 73 sq  meters  Kidney failure less than 15 ml/min/1 73 sq  meters  Lipase [[id number]]  (Normal) Collected:  04/25/18 0211    Lab Status:  Final result Specimen:  Blood from Arm, Right Updated:  04/25/18 0242     Lipase 258 u/L     Lactic acid, plasma [[id number]]  (Normal) Collected:  04/25/18 0211    Lab Status:  Final result Specimen:  Blood from Arm, Right Updated:  04/25/18 0240     LACTIC ACID 0 7 mmol/L     Narrative:         Result may be elevated if tourniquet was used during collection      CBC and differential [[id number]]  (Abnormal) Collected:  04/25/18 0211    Lab Status:  Final result Specimen:  Blood from Arm, Right Updated:  04/25/18 0224     WBC 10 15 Thousand/uL      RBC 4 02 Million/uL      Hemoglobin 10 5 (L) g/dL      Hematocrit 33 4 (L) %      MCV 83 fL      MCH 26 1 (L) pg      MCHC 31 4 g/dL      RDW 15 6 (H) %      MPV 9 5 fL      Platelets 180 Thousands/uL      nRBC 0 /100 WBCs      Neutrophils Relative 70 %      Lymphocytes Relative 16 %      Monocytes Relative 10 %      Eosinophils Relative 3 %      Basophils Relative 1 %      Neutrophils Absolute 7 17 Thousands/µL      Lymphocytes Absolute 1 66 Thousands/µL      Monocytes Absolute 0 96 Thousand/µL      Eosinophils Absolute 0 28 Thousand/µL      Basophils Absolute 0 06 Thousands/µL                  CT abdomen pelvis w contrast   Final Result by Beatrice Hansen DO (04/25 8756)      1  Perigastric gas-containing collection arising from the distal esophagus unchanged from prior study  Persistent fistulous tract from the stomach to the anterior abdominal wall with significantly increased amount of air extending to the anterior    abdominal wall  2   Decreased left subdiaphragmatic gas collection  3   Multiple bilateral nonobstructing renal calculi            Workstation performed: [id number]         IR tube check    (Results Pending)         Procedures  Procedures      Phone Consults  ED Phone Contact    ED Course  ED Course as of Apr 26 9899   Wed Apr 25, 2018   0230 Paged [facility] resident , awaiting call back     [phone] On reassessment patient has continued abdominal pain, ordered fentanyl  Await CT scan results    0405 Paged [facility] resident    [phone] Spoke to [place] , [facility] resident, will evaluate patient                                MDM  Number of Diagnoses or Management Options  Diagnosis management comments: 56-year-old female presents for evaluation of abdominal pain, patient has significant past medical history of complications status post gastric perforation    Will obtain lab work, CT of the abdomen to evaluate the nature of the pain and will have surgery evaluate the patient for further care    CritCare Time    Disposition  Final diagnoses:   Abdominal pain     Time reflects when diagnosis was documented in both MDM as applicable and the Disposition within this note     Time User Action Codes Description Comment    4/25/2018  5:35 AM [place] Player Add [R10 9] Abdominal pain     4/25/2018  9:32 AM Silvia Rod Add [K91 89] Gastric leak     4/25/2018  9:32 AM Silvia Rod Modify [P04 67] Gastric leak       ED Disposition     ED Disposition Condition Comment Admit  Case was discussed with [facility] and the patient's admission status was agreed to be Admission Status: observation status to the service of Dr Eliseo Gorman   Follow-up Information    None       There are no discharge medications for this patient  No discharge procedures on file  ED Provider  Attending physically available and evaluated Addison Contreras I managed the patient along with the ED Attending      Electronically Signed by         Vanessa Teran MD  04/26/18 0875

## 2018-04-25 NOTE — ED NOTES
Patient repositioned, resting comfortably at this time        Maria Isabel Gallagher RN  04/25/18 9206

## 2018-04-25 NOTE — BRIEF OP NOTE (RAD/CATH)
Abdominal drainage Tube check  Procedure Note    PATIENT NAME: Glenys Randall  : 1975  MRN: [id number]     Pre-op Diagnosis:   1  Abdominal pain    2  Gastric leak      Post-op Diagnosis:   1  Abdominal pain    2  Gastric leak        Surgeon:   Sol Fatima MD  Assistants:     No qualified resident was available, Resident is only observing    Estimated Blood Loss:  None  Findings:   Patient had undergone midline incision subcutaneous gas by surgery prior to the procedure    Following contrast injection of the indwelling abdominal tube, contrast migrates in to what appears to be the stomach as well as anteriorly into what appears to be soft tissue gas collection  It appeared as though contrast and air bubbles were emanating from the midline incision into the in place ostomy bag  Lateral images were also obtained to demonstrate the anterior draining pattern  The findings were discussed with Dr Todd Mandujano of surgery as well as the patient  In addition, there is concern on the part of interventional radiology nursing regarding the midline incision and the color of tissue  While the tissue appeared grossly unremarkable to me, I did relay the concern to Dr Brian Waterman who will evaluate the patient and incision immediately upon return to her room      Specimens:  None request    Complications:  Nothing immediately apparent    Anesthesia: None    Sol Fatima MD     Date: 2018  Time: 4:41 PM

## 2018-04-25 NOTE — CONSULTS
Consultation - Thoracic Surgery   Pauly Rogers 36 y o  female MRN: [id number]  Unit/Bed#: ED 01 Encounter: [id number]    Assessment/Plan     Assessment:  66 yo F with hx gastric leak at GE junction following attempted paraesophageal hernia repair at [facility]SH 1/2018, tube feed dependent, now with abdominal pain, abdominal wound distention, and increased drain output    Plan:  - f/u IR for drainage of midline wound, drain check of existing drain vs  Upsize  - analgesia  - resume tube feeds following IR drainage  - OOB, ambulate  - DVT ppx    History of Present Illness     HPI:  Pauly Rogers is a 36 y o  female who presents with abdomina pain and midline wound distention  She is well known to the thoracic and general surgery services since original presentation in January 2018 for gastric leak following paraesophageal hernia repair, history of gastric sleeve  Patient has had history of ex lap in January 2018 for intraabdominal abscess and attempts at esophageal stent  She has abdominal drains x2 and is tube feed dependent via Keofed  Consults    Review of Systems   Constitutional: Negative  HENT: Negative  Eyes: Negative  Respiratory: Negative  Cardiovascular: Negative  Gastrointestinal: Positive for abdominal distention and abdominal pain  Endocrine: Negative  Genitourinary: Negative  Musculoskeletal: Negative  Skin: Negative  Allergic/Immunologic: Negative  Neurological: Negative  Hematological: Negative  Psychiatric/Behavioral: Negative          Historical Information   Past Medical History:   Diagnosis Date    Brain condition     Pseudotumor Cerebri     Migraine     Obesity     Papilledema, both eyes     Presence of lumboperitoneal shunt     Resolved: Sep 20, 2017    Rotator cuff tendinitis     Resolved: Aug 23, 2017    Visual field defect      Past Surgical History:   Procedure Laterality Date    CSF SHUNT      LP shunt - 2015 -  shunt - 2017    ESOPHAGOGASTRODUODENOSCOPY N/A 2/23/2018    Procedure: ESOPHAGOGASTRODUODENOSCOPY (EGD) WITH ESOPHAGEAL STENT PLACEMENT;  Surgeon: Marcos Graham MD;  [facility];  Service: Thoracic    ESOPHAGOGASTRODUODENOSCOPY N/A 2/23/2018    Procedure: ESOPHAGOGASTRODUODENOSCOPY (EGD) WITH REMOVAL ESOPHAGEAL STENT  AND REPLACEMENT WITH 23mm X155mm [address] [facility] [address];  Surgeon: Marcos Graham MD;  [facility];  Service: Thoracic    ESOPHAGOGASTRODUODENOSCOPY N/A 3/28/2018    Procedure: ESOPHAGOGASTRODUODENOSCOPY (EGD) with PEJ placement ;  Surgeon: Martina Reyes MD;  [facility]; Service: Gastroenterology    ESOPHAGOGASTRODUODENOSCOPY N/A 4/5/2018    Procedure: ESOPHAGOGASTRODUODENOSCOPY (EGD) with botox injection and kaofed placement;  Surgeon: Woody Butts DO;  [facility]; Service: Gastroenterology    ESOPHAGOGASTRODUODENOSCOPY N/A 4/10/2018    Procedure: ESOPHAGOGASTRODUODENOSCOPY (EGD) with Kaofed placement;  Surgeon: Ok Fox MD;  [facility]; Service: Gastroenterology    ESOPHAGOSCOPY WITH STENT INSERTION N/A 1/24/2018    Procedure: INSERTION STENT ESOPHAGEAL;  Surgeon: Sophia Martines MD;  [facility]; Service: Gastroenterology    EYE SURGERY      for "crossed eyed" (in 2nd grade)     GASTRIC BYPASS  2016    HERNIA REPAIR      HYSTERECTOMY      LAPAROTOMY N/A 1/25/2018    Procedure: Exploratory Laparotomy, wash out,placement of drains, placement of NG feeding tube ;   Surgeon: Hanan Fischer DO;  [facility];  Service: General    FL CREATE SHUNT:VENTRIC-PERITONEAL Right 5/31/2017    Procedure: IMAGE GUIDED CORONAL PLACEMENT OF PROGRAMABLE VENTRICULAR-PERITONEAL SHUNT, REMOVAL OF LP SHUNT ;  Surgeon: Karon Heath MD;  [facility];  Service: Neurosurgery    FL Kristie Henry CSF SHUNT,W/O REPLACE Right 2/5/2018    Procedure: Removal of  shunt;  Surgeon: Karon Heath MD;  [facility];  Service: Neurosurgery    IN REPLACEMENT/REVISION,CSF SHUNT Right 1/25/2018    Procedure: Externalization of right-sided SHUNT VENTRICULAR-PERITONEAL in anterior chest wall ribs two and three level  ;  Surgeon: Ariel Archer MD;  [facility];  Service: Neurosurgery     Social History   History   Alcohol Use No     History   Drug Use No     History   Smoking Status    Never Smoker   Smokeless Tobacco    Never Used     Family History:   Family History   Problem Relation Age of Onset    No Known Problems Mother     No Known Problems Father     Thyroid cancer Brother     Colon cancer Maternal Grandfather     Cancer Paternal Grandmother     Cancer Paternal Grandfather        Meds/Allergies   PTA meds:   None     Allergies   Allergen Reactions    Benadryl [Diphenhydramine] Anaphylaxis     Throat closing    Benadryl [Diphenhydramine] Swelling    Phenergan [Promethazine] Anaphylaxis    Phenergan [Promethazine] Hives       Objective   First Vitals:   Blood Pressure: 108/62 (04/25/18 0131)  Pulse: 88 (04/25/18 0131)  Temperature: 98 3 °F (36 8 °C) (04/25/18 0131)  Temp Source: Oral (04/25/18 0131)  Respirations: 18 (04/25/18 0131)  Weight - Scale: 52 6 kg (116 lb) (04/25/18 0131)  SpO2: 97 % (04/25/18 0131)    Current Vitals:   Blood Pressure: (!) 91/49 (04/25/18 0615)  Pulse: 68 (04/25/18 0615)  Temperature: 98 3 °F (36 8 °C) (04/25/18 0131)  Temp Source: Oral (04/25/18 0131)  Respirations: 16 (04/25/18 0615)  Weight - Scale: 52 6 kg (116 lb) (04/25/18 0131)  SpO2: 95 % (04/25/18 0615)      Intake/Output Summary (Last 24 hours) at 04/25/18 1032  Last data filed at 04/25/18 0717   Gross per 24 hour   Intake             2000 ml   Output                0 ml   Net             2000 ml       Invasive Devices     Peripheral Intravenous Line            Peripheral IV 04/25/18 Right Hand less than 1 day          Drain            Closed/Suction Drain Right Abdomen Other (Comment) -- days    Closed/Suction Drain Midline;Superior Abdomen Other (Comment) 10 Fr  67 days    NG/OG/Enteral Tube Nasogastric 8 Fr Left nares 14 days                Physical Exam   Constitutional: She is oriented to person, place, and time  She appears well-developed and well-nourished  HENT:   Head: Normocephalic and atraumatic  Eyes: Pupils are equal, round, and reactive to light  Neck: Normal range of motion  Cardiovascular: Normal rate and regular rhythm  Pulmonary/Chest: Effort normal    Abdominal: Soft  There is tenderness  There is no rebound and no guarding  Fullness around midline wound   Musculoskeletal: Normal range of motion  Neurological: She is alert and oriented to person, place, and time  Skin: Skin is warm and dry  Lab Results:   CBC:   Lab Results   Component Value Date    WBC 10 15 04/25/2018    HGB 10 5 (L) 04/25/2018    HCT 33 4 (L) 04/25/2018    MCV 83 04/25/2018     04/25/2018    MCH 26 1 (L) 04/25/2018    MCHC 31 4 04/25/2018    RDW 15 6 (H) 04/25/2018    MPV 9 5 04/25/2018    NRBC 0 04/25/2018   , CMP:   Lab Results   Component Value Date     04/25/2018    K 4 5 04/25/2018     04/25/2018    CO2 28 04/25/2018    ANIONGAP 7 04/25/2018    BUN 14 04/25/2018    CREATININE 0 39 (L) 04/25/2018    GLUCOSE 95 04/25/2018    CALCIUM 9 0 04/25/2018    AST 40 04/25/2018    ALT 46 04/25/2018    ALKPHOS 61 04/25/2018    PROT 6 8 04/25/2018    BILITOT 0 35 04/25/2018    EGFR 129 04/25/2018   , Coagulation: No results found for: PT, INR, APTT  Imaging: I have personally reviewed pertinent reports  and I have personally reviewed pertinent films in PACS   Ct Abdomen Pelvis W Contrast    Result Date: 4/25/2018  Impression: 1  Perigastric gas-containing collection arising from the distal esophagus unchanged from prior study  Persistent fistulous tract from the stomach to the anterior abdominal wall with significantly increased amount of air extending to the anterior abdominal wall   2   Decreased left subdiaphragmatic gas collection  3   Multiple bilateral nonobstructing renal calculi Workstation performed: [id number]     EKG, Pathology, and Other Studies: I have personally reviewed pertinent reports  and I have personally reviewed pertinent films in PACS    Counseling / Coordination of Care  Total floor / unit time spent today 20 minutes  Greater than 50% of total time was spent with the patient and / or family counseling and / or coordination of care

## 2018-04-25 NOTE — H&P
H&P Exam - General Surgery   Amara Brizuela 36 y o  female MRN: [id number]  Unit/Bed#: ED 01 Encounter: [id number]    Assessment/Plan     Assessment:  16-year-old female status post gastric sleeve with injury near the angle of Hiss during attempted hiatal hernia repair at outside hospital   Admitted with abdominal pain and increased drain output, with a drain position near the known gastric sleeve injury  Patient also has post pyloric keofed  Plan:  1) Gastric perforation   - Botox injection in scar tissue to reduce gastric sleeve pressure performed by GI last admission   - continue drain   - hold tube feeds for now, but was tolerating Jevity 1 2@ 84 x 20 hrs at home w/ 110mL q 4 hr water flushes via [place] keofed     2) Enterocutaneous fistula   - appears to be producing non-sterile air pocket in subcutaneous tissue with increase fascial defect   - may need operative decompression, but in past IR decompression via drain placement has been successful so will plan on IR consult for drain decompression   - no abx at this time   - monitor exam given concern for soft tissue infection seeding w/ gastric contents leaking into closed space    3) DVT prophylaxis   - SQH   - SCDs    4) Dispo   - med surg   - thoracic c/s    Ana Laura Romero MD PGY-4  9:53 AM  04/25/18        History of Present Illness     HPI:  Amara Brizuela is a 36 y o  female who presents with abdominal pain  This is a 16-year-old female who had a gastric sleeve in the past with an injury near the angle of His during the attempted hiatal hernia repair in outside hospital   That same hospital attempted stenting of her gastric sleeve which had good endoscopic results but persistent leakage  The leakage was subsequently controlled with drains  The patient developed a leak from her midline abdomen which suggested enterocutaneous fistula formation  The collection in the soft tissue did require drainage   The patient eventually went to rehab, but was ultimately placed on TPN while in rehab  She was readmitted recently and discharge within this month for persistent gastric leak and management of her enterocutaneous fistula with wound manager  Her esophageal stent was eventually removed  During that admission, GI injected Botox into the antrum of her gastric sleeve where there was scar tissue in an attempt to reduce pressure within the lumen of the sleeve  The patient had a post pyloric and  keofed  placed with successful tolerating tube feeds  The patient was discharged with the drain near her perforation intact, and the keofed functioning  Her enterocutaneous fistula was no longer draining from midline and she did not require local wound care management  The patient is returning because over the weekend, her keofed stopped working  The  was able to unclog it  At the same time, her drain output near the known gastric perforation output increased from 5 mL a day to 20-25 mL a day, and she developed focal tenderness in epigastrium with minimal small focal area of distension  Review of Systems   Constitutional: Negative for fever  HENT: Negative for sore throat  Eyes: Negative for visual disturbance  Respiratory: Negative for shortness of breath  Cardiovascular: Negative for chest pain  Gastrointestinal: Positive for abdominal distention  Negative for constipation, diarrhea, nausea and vomiting  Endocrine: Negative for polyuria  Genitourinary: Negative for dysuria  Musculoskeletal: Negative for arthralgias  Skin: Negative for rash  Allergic/Immunologic: Negative for environmental allergies  Neurological: Negative for dizziness  Hematological: Negative for adenopathy  Psychiatric/Behavioral: The patient is not nervous/anxious          Historical Information   Past Medical History:   Diagnosis Date    Brain condition     Pseudotumor Cerebri     Migraine     Obesity     Papilledema, both eyes     Presence of lumboperitoneal shunt     Resolved: Sep 20, 2017   Kaylah Schroeder Rotator cuff tendinitis     Resolved: Aug 23, 2017    Visual field defect      Past Surgical History:   Procedure Laterality Date    CSF SHUNT      LP shunt - 2015 -  shunt - 2017    ESOPHAGOGASTRODUODENOSCOPY N/A 2/23/2018    Procedure: ESOPHAGOGASTRODUODENOSCOPY (EGD) WITH ESOPHAGEAL STENT PLACEMENT;  Surgeon: Mian Olivia MD;  [facility];  Service: Thoracic    ESOPHAGOGASTRODUODENOSCOPY N/A 2/23/2018    Procedure: ESOPHAGOGASTRODUODENOSCOPY (EGD) WITH REMOVAL ESOPHAGEAL STENT  AND REPLACEMENT WITH 23mm X155mm [address];  Surgeon: Mian Olivia MD;  [facility];  Service: Thoracic    ESOPHAGOGASTRODUODENOSCOPY N/A 3/28/2018    Procedure: ESOPHAGOGASTRODUODENOSCOPY (EGD) with PEJ placement ;  Surgeon: Fabien Teran MD;  [facility]; Service: Gastroenterology    ESOPHAGOGASTRODUODENOSCOPY N/A 4/5/2018    Procedure: ESOPHAGOGASTRODUODENOSCOPY (EGD) with botox injection and kaofed placement;  Surgeon: [employer], DO;  [facility]; Service: Gastroenterology    ESOPHAGOGASTRODUODENOSCOPY N/A 4/10/2018    Procedure: ESOPHAGOGASTRODUODENOSCOPY (EGD) with Kaofed placement;  Surgeon: Melinda Najera MD;  [facility]; Service: Gastroenterology    ESOPHAGOSCOPY WITH STENT INSERTION N/A 1/24/2018    Procedure: INSERTION STENT ESOPHAGEAL;  Surgeon: Matias Cruz MD;  [facility]; Service: Gastroenterology    EYE SURGERY      for "crossed eyed" (in 2nd grade)     GASTRIC BYPASS  2016    HERNIA REPAIR      HYSTERECTOMY      LAPAROTOMY N/A 1/25/2018    Procedure: Exploratory Laparotomy, wash out,placement of drains, placement of NG feeding tube ;   Surgeon: Romel Mcdaniels DO;  [facility];  Service: General    [place] CREATE SHUNT:VENTRIC-PERITONEAL Right 5/31/2017    Procedure: IMAGE GUIDED CORONAL PLACEMENT OF PROGRAMABLE VENTRICULAR-PERITONEAL SHUNT, REMOVAL OF LP SHUNT ;  Surgeon: Adolphus Boas, MD;  [facility];  Service: Neurosurgery    [place] [address] CSF SHUNT,W/O REPLACE Right 2/5/2018    Procedure: Removal of  shunt;  Surgeon: Adolphus Boas, MD;  [facility];  Service: Neurosurgery    [place] REPLACEMENT/REVISION,CSF SHUNT Right 1/25/2018    Procedure: Externalization of right-sided SHUNT VENTRICULAR-PERITONEAL in anterior chest wall ribs two and three level  ;  Surgeon: Muna English MD;  [facility];  Service: Neurosurgery     Social History   History   Alcohol Use No     History   Drug Use No     History   Smoking Status    Never Smoker   Smokeless Tobacco    Never Used     Family History: non-contributory    Meds/Allergies   PTA meds:   None     Allergies   Allergen Reactions    Benadryl [Diphenhydramine] Anaphylaxis     Throat closing    Benadryl [Diphenhydramine] Swelling    Phenergan [Promethazine] Anaphylaxis    Phenergan [Promethazine] Hives       Objective   First Vitals:   Blood Pressure: 108/62 (04/25/18 0131)  Pulse: 88 (04/25/18 0131)  Temperature: 98 3 °F (36 8 °C) (04/25/18 0131)  Temp Source: Oral (04/25/18 0131)  Respirations: 18 (04/25/18 0131)  Weight - Scale: 52 6 kg (116 lb) (04/25/18 0131)  SpO2: 97 % (04/25/18 0131)    Current Vitals:   Blood Pressure: (!) 91/49 (04/25/18 0615)  Pulse: 68 (04/25/18 0615)  Temperature: 98 3 °F (36 8 °C) (04/25/18 0131)  Temp Source: Oral (04/25/18 0131)  Respirations: 16 (04/25/18 0615)  Weight - Scale: 52 6 kg (116 lb) (04/25/18 0131)  SpO2: 95 % (04/25/18 0615)      Intake/Output Summary (Last 24 hours) at 04/25/18 0936  Last data filed at 04/25/18 0717   Gross per 24 hour   Intake             2000 ml   Output                0 ml   Net             2000 ml       Invasive Devices     Peripheral Intravenous Line            Peripheral IV 04/25/18 Right Hand less than 1 day          Drain            Closed/Suction Drain Right Abdomen Other (Comment) -- days    Closed/Suction Drain Midline;Superior Abdomen Other (Comment) 10 Fr  67 days    NG/OG/Enteral Tube Nasogastric 8 Fr Left nares 14 days                Physical Exam   Constitutional: She is oriented to person, place, and time  She appears well-developed and well-nourished  HENT:   Head: Normocephalic and atraumatic    keofed in position   Eyes: Pupils are equal, round, and reactive to light  Neck: Normal range of motion  No tracheal deviation present  Cardiovascular: Normal rate and regular rhythm  Pulmonary/Chest: Effort normal  She has no wheezes  Abdominal: Soft  Distention: epigastrium with mild focal distension  There is tenderness  Drain w/ murky serous fluid   Musculoskeletal: Normal range of motion  She exhibits no edema  Neurological: She is alert and oriented to person, place, and time  No cranial nerve deficit  Skin: Skin is warm and dry  Psychiatric: She has a normal mood and affect  Lab Results:   I have personally reviewed pertinent lab results    , CBC:   Lab Results   Component Value Date    WBC 10 15 04/25/2018    HGB 10 5 (L) 04/25/2018    HCT 33 4 (L) 04/25/2018    MCV 83 04/25/2018     04/25/2018    MCH 26 1 (L) 04/25/2018    MCHC 31 4 04/25/2018    RDW 15 6 (H) 04/25/2018    MPV 9 5 04/25/2018    NRBC 0 04/25/2018   , CMP:   Lab Results   Component Value Date     04/25/2018    K 4 5 04/25/2018     04/25/2018    CO2 28 04/25/2018    ANIONGAP 7 04/25/2018    BUN 14 04/25/2018    CREATININE 0 39 (L) 04/25/2018    GLUCOSE 95 04/25/2018    CALCIUM 9 0 04/25/2018    AST 40 04/25/2018    ALT 46 04/25/2018    ALKPHOS 61 04/25/2018    PROT 6 8 04/25/2018    BILITOT 0 35 04/25/2018    EGFR 129 04/25/2018   , Coagulation: No results found for: PT, INR, APTT, Urinalysis: No results found for: Maribel Penning, SPECGRAV, PHUR, LEUKOCYTESUR, NITRITE, PROTEINUA, GLUCOSEU, KETONESU, BILIRUBINUR, BLOODU, Amylase: No results found for: AMYLASE, Lipase:   Lab Results   Component Value Date    LIPASE 258 04/25/2018     Imaging: I have personally reviewed pertinent films in PACS   4/24 CT-a/p:    1  Perigastric gas-containing collection arising from the distal esophagus unchanged from prior study  Persistent fistulous tract from the stomach to the anterior abdominal wall with significantly increased amount of air extending to the anterior   abdominal wall  2   Decreased left subdiaphragmatic gas collection  3   Multiple bilateral nonobstructing renal calculi    EKG, Pathology, and Other Studies: I have personally reviewed pertinent films in PACS    Code Status: Level 1 - Full Code  Advance Directive and Living Will:      Power of :    POLST:      Counseling / Coordination of Care  Total floor / unit time spent today 30 minutes  Greater than 50% of total time was spent with the patient and / or family counseling and / or coordination of care  A description of the counseling / coordination of care: plan of care

## 2018-04-25 NOTE — ED ATTENDING ATTESTATION
Aleksandar Carrasco MD, saw and evaluated the patient  I have discussed the patient with the resident/non-physician practitioner and agree with the resident's/non-physician practitioner's findings, Plan of Care, and MDM as documented in the resident's/non-physician practitioner's note, except where noted  All available labs and Radiology studies were reviewed  At this point I agree with the current assessment done in the Emergency Department  I have conducted an independent evaluation of this patient including a focused history of:    Emergency Department Note- Glenys Randall 36 y o  female MRN: [id number]    Unit/Bed#: [facility] 611-01 Encounter: [id number]    Glenys Randall is a 36 y o  female who presents with   Chief Complaint   Patient presents with    Abdominal Pain     severe abdominal pain for 2 days, has a perforated stomach, pt is getting tube feeds and abdomen is "swelling", no fever or chills, no N/V         History of Present Illness   HPI:  Glenys Randall is a 36 y o  female who presents for evaluation of:    Abrupt onset of worsening abdominal pain, abdominal lump that is increasing in size throughout the day,   And increased drainage from her ADENIKE drain  The patient was recently discharged from a prolonged hospital admission for complications after hiatal hernia surgery at an outside hospital   The patient during her stay had multiple enterocutaneous fistulas, repair of gastric perforation and gastric sleeve tear  The patient denies associated nausea and vomiting today  The patient is being fed through a nasogastric tube enteral nutrition  The pain in her mid epigastric area with the lump is is severe and persistent throughout the day  Movement does not exacerbate the discomfort  Review of Systems   Constitutional: Negative for fatigue and fever  Respiratory: Negative for shortness of breath and wheezing  Cardiovascular: Negative for chest pain and palpitations     Gastrointestinal: Positive for abdominal distention and abdominal pain  Negative for constipation, diarrhea, nausea, rectal pain and vomiting  All other systems reviewed and are negative  Historical Information   Past Medical History:   Diagnosis Date    Brain condition     Pseudotumor Cerebri     Migraine     Obesity     Papilledema, both eyes     Presence of lumboperitoneal shunt     Resolved: Sep 20, 2017    Rotator cuff tendinitis     Resolved: Aug 23, 2017    Visual field defect      Past Surgical History:   Procedure Laterality Date    CSF SHUNT      LP shunt - 2015 -  shunt - 2017    ESOPHAGOGASTRODUODENOSCOPY N/A 2/23/2018    Procedure: ESOPHAGOGASTRODUODENOSCOPY (EGD) WITH ESOPHAGEAL STENT PLACEMENT;  Surgeon: Vinh Davis MD;  [facility];  Service: Thoracic    ESOPHAGOGASTRODUODENOSCOPY N/A 2/23/2018    Procedure: ESOPHAGOGASTRODUODENOSCOPY (EGD) WITH REMOVAL ESOPHAGEAL STENT  AND REPLACEMENT WITH 23mm X155mm [address];  Surgeon: Vinh Davis MD;  [facility];  Service: Thoracic    ESOPHAGOGASTRODUODENOSCOPY N/A 3/28/2018    Procedure: ESOPHAGOGASTRODUODENOSCOPY (EGD) with PEJ placement ;  Surgeon: Vnadana Soto MD;  [facility]; Service: Gastroenterology    ESOPHAGOGASTRODUODENOSCOPY N/A 4/5/2018    Procedure: ESOPHAGOGASTRODUODENOSCOPY (EGD) with botox injection and kaofed placement;  Surgeon: Daniella Awad DO;  [facility]; Service: Gastroenterology    ESOPHAGOGASTRODUODENOSCOPY N/A 4/10/2018    Procedure: ESOPHAGOGASTRODUODENOSCOPY (EGD) with Kaofed placement;  Surgeon: Jus Camejo MD;  [facility]; Service: Gastroenterology    ESOPHAGOSCOPY WITH STENT INSERTION N/A 1/24/2018    Procedure: INSERTION STENT ESOPHAGEAL;  Surgeon: Elzbieta Rayo MD;  [facility];   Service: Gastroenterology    EYE SURGERY      for "crossed eyed" (in 2nd grade)     GASTRIC BYPASS  2016    HERNIA REPAIR      HYSTERECTOMY      LAPAROTOMY N/A 1/25/2018    Procedure: Exploratory Laparotomy, wash out,placement of drains, placement of NG feeding tube ;   Surgeon: Cassius Huynh DO;  [facility];  Service: General    ME CREATE SHUNT:VENTRIC-PERITONEAL Right 5/31/2017    Procedure: IMAGE GUIDED CORONAL PLACEMENT OF PROGRAMABLE VENTRICULAR-PERITONEAL SHUNT, REMOVAL OF LP SHUNT ;  Surgeon: Eliza Haney MD;  [facility];  Service: Neurosurgery    ME [address] CSF SHUNT,W/O REPLACE Right 2/5/2018    Procedure: Removal of  shunt;  Surgeon: Eliza Haney MD;  [facility];  Service: Neurosurgery    ME REPLACEMENT/REVISION,CSF SHUNT Right 1/25/2018    Procedure: Externalization of right-sided SHUNT VENTRICULAR-PERITONEAL in anterior chest wall ribs two and three level  ;  Surgeon: Anthony Galeano MD;  [facility];  Service: Neurosurgery     Social History   History   Alcohol Use No     History   Drug Use No     History   Smoking Status    Never Smoker   Smokeless Tobacco    Never Used     Family History: non-contributory    Meds/Allergies   all medications and allergies reviewed  Allergies   Allergen Reactions    Benadryl [Diphenhydramine] Anaphylaxis     Throat closing    Benadryl [Diphenhydramine] Swelling    Phenergan [Promethazine] Anaphylaxis    Phenergan [Promethazine] Hives       Objective   First Vitals:   Blood Pressure: 108/62 (04/25/18 0131)  Pulse: 88 (04/25/18 0131)  Temperature: 98 3 °F (36 8 °C) (04/25/18 0131)  Temp Source: Oral (04/25/18 0131)  Respirations: 18 (04/25/18 0131)  Height: 5' 4" (162 6 cm) (04/25/18 1313)  Weight - Scale: 52 6 kg (116 lb) (04/25/18 0131)  SpO2: 97 % (04/25/18 0131)    Current Vitals:   Blood Pressure: 95/52 (04/25/18 1313)  Pulse: 74 (04/25/18 1313)  Temperature: 98 7 °F (37 1 °C) (04/25/18 1313)  Temp Source: Oral (04/25/18 1313)  Respirations: 16 (04/25/18 1313)  Height: 5' 4" (162 6 cm) (04/25/18 1313)  Weight - Scale: 54 5 kg (120 lb 3 2 oz) (18 1313)  SpO2: 95 % (18 1313)      Intake/Output Summary (Last 24 hours) at 18 1355  Last data filed at 18 1352   Gross per 24 hour   Intake             2000 ml   Output              100 ml   Net             1900 ml       Invasive Devices     Peripheral Intravenous Line            Peripheral IV 18 Right Hand less than 1 day          Drain            Closed/Suction Drain Right Abdomen Other (Comment) -- days    Closed/Suction Drain Midline;Superior Abdomen Other (Comment) 10 Fr  67 days    NG/OG/Enteral Tube Nasogastric 8 Fr Left nares 14 days                Physical Exam   Constitutional: She is oriented to person, place, and time  She appears distressed (  Female presents in mild distress secondary to abdominal discomfort )  HENT:   Head: Normocephalic and atraumatic  The patient has a nasogastric tube placed in her left nares  Eyes: Conjunctivae are normal  Pupils are equal, round, and reactive to light  Neck: Normal range of motion  Cardiovascular: Normal rate and regular rhythm  Pulmonary/Chest: Effort normal and breath sounds normal    Abdominal: Bowel sounds are normal  There is tenderness ( midepigastric area)  Musculoskeletal: Normal range of motion  Neurological: She is alert and oriented to person, place, and time  Skin: Skin is warm and dry  She is not diaphoretic  Psychiatric: She has a normal mood and affect  Her behavior is normal  Judgment and thought content normal    Nursing note and vitals reviewed  Medical Decision Makin  Abdominal pain and abdominal mass  Status post complicated postoperative course following hiatal hernia surgery with multiple postoperative repair interventions both at [place] [address] and at [facility]  Plan pain control in the ED  Plan to obtain CT scan of the abdomen and pelvis to assess for small-bowel obstruction and hernia and enterocutaneous fistula recurrence    Plan to obtain CBC to rule out leukocytosis and anemia  Plan to obtain complete metabolic profile to rule out uremia, electrolyte disturbance, and hypoglycemia  Plan to administer IV fluids in the emergency department to treat dehydration      Recent Results (from the past 36 hour(s))   CBC and differential    Collection Time: 04/25/18  2:11 AM   Result Value Ref Range    WBC 10 15 4 31 - 10 16 Thousand/uL    RBC 4 02 3 81 - 5 12 Million/uL    Hemoglobin 10 5 (L) 11 5 - 15 4 g/dL    Hematocrit 33 4 (L) 34 8 - 46 1 %    MCV 83 82 - 98 fL    MCH 26 1 (L) 26 8 - 34 3 pg    MCHC 31 4 31 4 - 37 4 g/dL    RDW 15 6 (H) 11 6 - 15 1 %    MPV 9 5 8 9 - 12 7 fL    Platelets 130 413 - 795 Thousands/uL    nRBC 0 /100 WBCs    Neutrophils Relative 70 43 - 75 %    Lymphocytes Relative 16 14 - 44 %    Monocytes Relative 10 4 - 12 %    Eosinophils Relative 3 0 - 6 %    Basophils Relative 1 0 - 1 %    Neutrophils Absolute 7 17 1 85 - 7 62 Thousands/µL    Lymphocytes Absolute 1 66 0 60 - 4 47 Thousands/µL    Monocytes Absolute 0 96 0 17 - 1 22 Thousand/µL    Eosinophils Absolute 0 28 0 00 - 0 61 Thousand/µL    Basophils Absolute 0 06 0 00 - 0 10 Thousands/µL   Comprehensive metabolic panel    Collection Time: 04/25/18  2:11 AM   Result Value Ref Range    Sodium 136 136 - 145 mmol/L    Potassium 4 5 3 5 - 5 3 mmol/L    Chloride 101 100 - 108 mmol/L    CO2 28 21 - 32 mmol/L    Anion Gap 7 4 - 13 mmol/L    BUN 14 5 - 25 mg/dL    Creatinine 0 39 (L) 0 60 - 1 30 mg/dL    Glucose 95 65 - 140 mg/dL    Calcium 9 0 8 3 - 10 1 mg/dL    AST 40 5 - 45 U/L    ALT 46 12 - 78 U/L    Alkaline Phosphatase 61 46 - 116 U/L    Total Protein 6 8 6 4 - 8 2 g/dL    Albumin 2 7 (L) 3 5 - 5 0 g/dL    Total Bilirubin 0 35 0 20 - 1 00 mg/dL    eGFR 129 ml/min/1 73sq m   Lactic acid, plasma    Collection Time: 04/25/18  2:11 AM   Result Value Ref Range    LACTIC ACID 0 7 0 5 - 2 0 mmol/L   Lipase    Collection Time: 04/25/18  2:11 AM   Result Value Ref Range    Lipase 258 73 - 393 u/L   Procalcitonin Collection Time: 04/25/18  2:26 AM   Result Value Ref Range    Procalcitonin <0 05 <=0 25 ng/ml     CT abdomen pelvis w contrast   Final Result      1  Perigastric gas-containing collection arising from the distal esophagus unchanged from prior study  Persistent fistulous tract from the stomach to the anterior abdominal wall with significantly increased amount of air extending to the anterior    abdominal wall  2   Decreased left subdiaphragmatic gas collection  3   Multiple bilateral nonobstructing renal calculi            Workstation performed: [id number]         IR tube check    (Results Pending)         Portions of the record may have been created with voice recognition software  Occasional wrong word or "sound a like" substitutions may have occurred due to the inherent limitations of voice recognition software  Read the chart carefully and recognize, using context, where substitutions have occurred

## 2018-04-25 NOTE — ED NOTES
Dr Esmer Medeiros at the bedside for patient evaluation at this time     Mick Burns, RN  04/25/18 1337

## 2018-04-25 NOTE — ED NOTES
Dr Gurjit Arredondo at the bedside for patient evaluation at this time     Jeannie Chino, RN  04/25/18 9730

## 2018-04-25 NOTE — ED NOTES
Pt requesting pain medication; Pt offered PRN pain medication through NG tube- pt refused states she does not want medication through NG and would like it IV  Spoke with surgery resident who will change the order    Pt aware a/w order change to medicate     Rita Brownlee RN  83/34/71 [id number]

## 2018-04-25 NOTE — PROGRESS NOTES
Bedside incision of abdominal wall     Patient seen  Explained risks and benefits of incision  She and her partner were agreeable  Area was then cleansed with betadine  Approximately 20cc of 1% lidocaine were used on the subcutaneous tissue of the midline abdominal wall  The area was then incised with a #11 blade, over the area that appeared most thin with obvious subcutaneous emphysema  Air was evacuated and a cavity was appreciated  A small amount of mucus was evacuated  Contents did not appear feculent, non foul smelling  Placed ostomy appliance over incision to evaluate for any further drainage  Patient tolerated procedure  Dr Eleazar Akbar was immediately available during the procedure

## 2018-04-26 LAB
ANION GAP SERPL CALCULATED.3IONS-SCNC: 6 MMOL/L (ref 4–13)
BASOPHILS # BLD AUTO: 0.03 THOUSANDS/ΜL (ref 0–0.1)
BASOPHILS NFR BLD AUTO: 1 % (ref 0–1)
BUN SERPL-MCNC: 12 MG/DL (ref 5–25)
CALCIUM SERPL-MCNC: 8.3 MG/DL (ref 8.3–10.1)
CHLORIDE SERPL-SCNC: 103 MMOL/L (ref 100–108)
CO2 SERPL-SCNC: 27 MMOL/L (ref 21–32)
CREAT SERPL-MCNC: 0.38 MG/DL (ref 0.6–1.3)
EOSINOPHIL # BLD AUTO: 0.17 THOUSAND/ΜL (ref 0–0.61)
EOSINOPHIL NFR BLD AUTO: 3 % (ref 0–6)
ERYTHROCYTE [DISTWIDTH] IN BLOOD BY AUTOMATED COUNT: 15.9 % (ref 11.6–15.1)
GFR SERPL CREATININE-BSD FRML MDRD: 130 ML/MIN/1.73SQ M
GLUCOSE SERPL-MCNC: 120 MG/DL (ref 65–140)
HCT VFR BLD AUTO: 30.2 % (ref 34.8–46.1)
HGB BLD-MCNC: 9.4 G/DL (ref 11.5–15.4)
LYMPHOCYTES # BLD AUTO: 1.23 THOUSANDS/ΜL (ref 0.6–4.47)
LYMPHOCYTES NFR BLD AUTO: 20 % (ref 14–44)
MAGNESIUM SERPL-MCNC: 1.8 MG/DL (ref 1.6–2.6)
MCH RBC QN AUTO: 26.3 PG (ref 26.8–34.3)
MCHC RBC AUTO-ENTMCNC: 31.1 G/DL (ref 31.4–37.4)
MCV RBC AUTO: 85 FL (ref 82–98)
MONOCYTES # BLD AUTO: 0.49 THOUSAND/ΜL (ref 0.17–1.22)
MONOCYTES NFR BLD AUTO: 8 % (ref 4–12)
NEUTROPHILS # BLD AUTO: 4.14 THOUSANDS/ΜL (ref 1.85–7.62)
NEUTS SEG NFR BLD AUTO: 68 % (ref 43–75)
NRBC BLD AUTO-RTO: 0 /100 WBCS
PLATELET # BLD AUTO: 263 THOUSANDS/UL (ref 149–390)
PMV BLD AUTO: 9.6 FL (ref 8.9–12.7)
POTASSIUM SERPL-SCNC: 4.2 MMOL/L (ref 3.5–5.3)
PREALB SERPL-MCNC: 18.4 MG/DL (ref 18–40)
RBC # BLD AUTO: 3.57 MILLION/UL (ref 3.81–5.12)
SODIUM SERPL-SCNC: 136 MMOL/L (ref 136–145)
WBC # BLD AUTO: 6.07 THOUSAND/UL (ref 4.31–10.16)

## 2018-04-26 PROCEDURE — 84134 ASSAY OF PREALBUMIN: CPT | Performed by: SURGERY

## 2018-04-26 PROCEDURE — 83735 ASSAY OF MAGNESIUM: CPT | Performed by: SURGERY

## 2018-04-26 PROCEDURE — 99024 POSTOP FOLLOW-UP VISIT: CPT | Performed by: SURGERY

## 2018-04-26 PROCEDURE — 80048 BASIC METABOLIC PNL TOTAL CA: CPT | Performed by: SURGERY

## 2018-04-26 PROCEDURE — 85025 COMPLETE CBC W/AUTO DIFF WBC: CPT | Performed by: SURGERY

## 2018-04-26 PROCEDURE — 99224 PR SBSQ OBSERVATION CARE/DAY 15 MINUTES: CPT | Performed by: THORACIC SURGERY (CARDIOTHORACIC VASCULAR SURGERY)

## 2018-04-26 RX ORDER — ACETAMINOPHEN 160 MG/5ML
650 SUSPENSION, ORAL (FINAL DOSE FORM) ORAL EVERY 6 HOURS PRN
Status: DISCONTINUED | OUTPATIENT
Start: 2018-04-26 | End: 2018-04-30 | Stop reason: HOSPADM

## 2018-04-26 RX ADMIN — HYDROMORPHONE HYDROCHLORIDE 1 MG: 1 INJECTION, SOLUTION INTRAMUSCULAR; INTRAVENOUS; SUBCUTANEOUS at 22:03

## 2018-04-26 RX ADMIN — HYDROMORPHONE HYDROCHLORIDE 1 MG: 1 INJECTION, SOLUTION INTRAMUSCULAR; INTRAVENOUS; SUBCUTANEOUS at 11:51

## 2018-04-26 RX ADMIN — HYDROMORPHONE HYDROCHLORIDE 1 MG: 1 INJECTION, SOLUTION INTRAMUSCULAR; INTRAVENOUS; SUBCUTANEOUS at 08:32

## 2018-04-26 RX ADMIN — HYDROMORPHONE HYDROCHLORIDE 1 MG: 1 INJECTION, SOLUTION INTRAMUSCULAR; INTRAVENOUS; SUBCUTANEOUS at 17:06

## 2018-04-26 RX ADMIN — HEPARIN SODIUM 5000 UNITS: 5000 INJECTION, SOLUTION INTRAVENOUS; SUBCUTANEOUS at 22:04

## 2018-04-26 RX ADMIN — HYDROMORPHONE HYDROCHLORIDE 1 MG: 1 INJECTION, SOLUTION INTRAMUSCULAR; INTRAVENOUS; SUBCUTANEOUS at 04:55

## 2018-04-26 RX ADMIN — HEPARIN SODIUM 5000 UNITS: 5000 INJECTION, SOLUTION INTRAVENOUS; SUBCUTANEOUS at 14:07

## 2018-04-26 RX ADMIN — HEPARIN SODIUM 5000 UNITS: 5000 INJECTION, SOLUTION INTRAVENOUS; SUBCUTANEOUS at 04:55

## 2018-04-26 NOTE — RESTORATIVE TECHNICIAN NOTE
Restorative Specialist Mobility Note       Activity: Ambulate in fuchs, Ambulate in room, Bathroom privileges, Dangle, Stand at bedside (Educated/encouraged pt to ambulate with assistance 3-4 x's/day  Bed alarm on   Pt callbell, phone/tray within reach )     Assistive Device: None       Hailey Pool BS, Restorative Technician, [facility]

## 2018-04-26 NOTE — PROGRESS NOTES
Progress Note - Thoracic Surgery   Ruth Ritchie 36 y o  female MRN: [id number]  Unit/Bed#: [facility] 611-01 Encounter: [id number]    Assessment:  68 yo F with hx gastric leak at GE junction following attempted paraesophageal hernia repair at [facility] 1/2018, tube feed dependent, now with abdominal pain, abdominal wound distention, and increased drain output    Plan:  - tube feeds restarted at goal  - continue midline wound manager  - continue IR drain  - OOB, ambulate  - dispo planning    Subjective/Objective     Subjective: Patient reports improved bloating since wound opened yesterday  Still has abdominal pain      Objective:     Vitals: Blood pressure 96/52, pulse 91, temperature 98 4 °F (36 9 °C), temperature source Oral, resp  rate 18, height 5' 4" (1 626 m), weight 54 5 kg (120 lb 3 2 oz), SpO2 97 %, not currently breastfeeding  ,Body mass index is 20 63 kg/m²  I/O       04/24 0701 - 04/25 0700 04/25 0701 - 04/26 0700    P  O   0    I V  (mL/kg)  1626 7 (29 8)    NG/GT  890    IV Piggyback 1000     Total Intake(mL/kg) 1000 (19) 2516 7 (46 2)    Urine (mL/kg/hr)  1225 (0 9)    Drains  255 (0 2)    Other  100 (0 1)    Stool  0 (0)    Total Output   1580    Net +1000 +936 7          Unmeasured Stool Occurrence  0 x          Physical Exam:  GEN: NAD  HEENT: MMM  CV: RRR  Lung: Normal effort  Ab: Soft, NT/ND, midline wound manager with brown drainage  Extrem: No CCE  Neuro:  A+Ox3    Lab, Imaging and other studies:   CBC with diff:   Lab Results   Component Value Date    WBC 6 07 04/26/2018    HGB 9 4 (L) 04/26/2018    HCT 30 2 (L) 04/26/2018    MCV 85 04/26/2018     04/26/2018    MCH 26 3 (L) 04/26/2018    MCHC 31 1 (L) 04/26/2018    RDW 15 9 (H) 04/26/2018    MPV 9 6 04/26/2018    NRBC 0 04/26/2018   , BMP/CMP: No results found for: NA, K, CL, CO2, ANIONGAP, BUN, CREATININE, GLUCOSE, CALCIUM, AST, ALT, ALKPHOS, PROT, ALBUMIN, BILITOT, EGFR, Magnesium: No results found for: MAG  VTE Pharmacologic Prophylaxis: Heparin  VTE Mechanical Prophylaxis: sequential compression device

## 2018-04-26 NOTE — PROGRESS NOTES
Progress Note - General Surgery   Roger Zuniga 36 y o  female MRN: [id number]  Unit/Bed#: [facility] 611-01 Encounter: [id number]    Assessment:  68 yo F with hx gastric leak at GE junction following attempted paraesophageal hernia repair at [facility] 1/2018, tube feed dependent, now with abdominal pain, abdominal wound distention, and increased drain output    Plan:  - tube feeds restarted at goal  - continue midline wound manager  - continue IR drain  - OOB, ambulate  - dispo planning    Subjective/Objective     Subjective: Patient reports improved bloating since wound opened yesterday  Still has abdominal pain  Objective:     Vitals: Blood pressure 96/52, pulse 91, temperature 98 4 °F (36 9 °C), temperature source Oral, resp  rate 18, height 5' 4" (1 626 m), weight 54 5 kg (120 lb 3 2 oz), SpO2 97 %, not currently breastfeeding  ,Body mass index is 20 63 kg/m²  I/O       04/24 0701 - 04/25 0700 04/25 0701 - 04/26 0700    P  O   0    I V  (mL/kg)  1626 7 (29 8)    NG/GT  890    IV Piggyback 1000     Total Intake(mL/kg) 1000 (19) 2516 7 (46 2)    Urine (mL/kg/hr)  1225 (0 9)    Drains  255 (0 2)    Other  100 (0 1)    Stool  0 (0)    Total Output   1580    Net +1000 +936 7          Unmeasured Stool Occurrence  0 x          Physical Exam:  GEN: NAD  HEENT: MMM  CV: RRR  Lung: Normal effort  Ab: Soft, NT/ND, wound manager with dark brown drainage  Extrem: No CCE  Neuro:  A+Ox3    Lab, Imaging and other studies:   CBC with diff:   Lab Results   Component Value Date    WBC 6 07 04/26/2018    HGB 9 4 (L) 04/26/2018    HCT 30 2 (L) 04/26/2018    MCV 85 04/26/2018     04/26/2018    MCH 26 3 (L) 04/26/2018    MCHC 31 1 (L) 04/26/2018    RDW 15 9 (H) 04/26/2018    MPV 9 6 04/26/2018    NRBC 0 04/26/2018   , BMP/CMP: No results found for: NA, K, CL, CO2, ANIONGAP, BUN, CREATININE, GLUCOSE, CALCIUM, AST, ALT, ALKPHOS, PROT, ALBUMIN, BILITOT, EGFR, Magnesium: No results found for: MAG  VTE Pharmacologic Prophylaxis: Heparin  VTE Mechanical Prophylaxis: sequential compression device

## 2018-04-26 NOTE — NUTRITION
Nutrition consult:     04/26/18 3599   Recommendations/Interventions   Summary current EN (jevity 1 2 at 84ml/hr x 20 hours and 100ml water flush q 4 hours) meets estimated nutrition needs   Malnutrition/BMI Present Yes   Malnutrition type Chronic illness  (r/t medical condition, as evidenced by 14# wt loss (10 %) within 1 1/2months, mild muscle mass depletion noted at temples   Treated with nutrition support)   Degree of Malnutrition Malnutrition of moderate degree   Malnutrition Characteristics Muscle loss;Weight loss   Nutrition Recommendations Continue EN/PN as ordered

## 2018-04-26 NOTE — CASE MANAGEMENT
Initial Clinical Review    Thank you,  [facility] in the [facility] by Donald Villa for 2017  Network Utilization Review Department  Phone: [phone]; Fax [phone]  ATTENTION: The Network Utilization Review Department is now centralized for our 7 Facilities  Make a note that we have a new phone and fax numbers for our Department  Please call with any questions or concerns to [phone] and carefully follow the prompts so that you are directed to the right person  All voicemails are confidential  Fax any determinations, approvals, denials, and requests for initial or continue stay review clinical to [phone]  Due to HIGH CALL volume, it would be easier if you could please send faxed requests to expedite your requests and in part, help us provide discharge notifications faster  Admission: Date/Time/Statement: Placed in Observation status on 4/25 @ 05:35    Orders Placed This Encounter   Procedures    Place in Observation     Standing Status:   Standing     Number of Occurrences:   1     Order Specific Question:   Admitting Physician     Answer:   Pool Cevallos [[id number]]     Order Specific Question:   Level of Care     Answer:   Med Surg [16]         ED: Date/Time/Mode of Arrival:   ED Arrival Information     Expected Arrival Acuity Means of Arrival Escorted By Service Admission Type    - 4/25/2018 01:27 Emergent Walk-In Self Surgery-General Emergency    Arrival Complaint    Abdominal Pain           Chief Complaint:   Chief Complaint   Patient presents with    Abdominal Pain     severe abdominal pain for 2 days, has a perforated stomach, pt is getting tube feeds and abdomen is "swelling", no fever or chills, no N/V       History of Illness: Pauly Rogers is a 36 y o  female who presents with abdominal pain   This is a 58-year-old female who had a gastric sleeve in the past with an injury near the angle of His during the attempted hiatal hernia repair in outside hospital   That same hospital attempted stenting of her gastric sleeve which had good endoscopic results but persistent leakage  The leakage was subsequently controlled with drains  The patient developed a leak from her midline abdomen which suggested enterocutaneous fistula formation  The collection in the soft tissue did require drainage  The patient eventually went to rehab, but was ultimately placed on TPN while in rehab  She was readmitted recently and discharge within this month for persistent gastric leak and management of her enterocutaneous fistula with wound manager  Her esophageal stent was eventually removed  During that admission, GI injected Botox into the antrum of her gastric sleeve where there was scar tissue in an attempt to reduce pressure within the lumen of the sleeve  The patient had a post pyloric and  keofed  placed with successful tolerating tube feeds  The patient was discharged with the drain near her perforation intact, and the keofed functioning  Her enterocutaneous fistula was no longer draining from midline and she did not require local wound care management  The patient is returning because over the weekend, her keofed stopped working  The  was able to unclog it  At the same time, her drain output near the known gastric perforation output increased from 5 mL a day to 20-25 mL a day, and she developed focal tenderness in epigastrium with minimal small focal area of distension      ED Vital Signs:   ED Triage Vitals [04/25/18 0131]   Temperature Pulse Respirations Blood Pressure SpO2   98 3 °F (36 8 °C) 88 18 108/62 97 %      Temp Source Heart Rate Source Patient Position - Orthostatic VS BP Location FiO2 (%)   Oral Monitor Sitting Left arm --      Pain Score       8        Wt Readings from Last 1 Encounters:   04/25/18 54 5 kg (120 lb 3 2 oz)         Abnormal Labs/Diagnostic Test Results:    Ref Range & Units 4/26/18 0551 4/25/18 0211   Sodium 136 - 145 mmol/L 136  136    Potassium 3 5 - 5 3 mmol/L 4 2  4 5CM    Chloride 100 - 108 mmol/L 103  101    CO2 21 - 32 mmol/L 27  28    Anion Gap 4 - 13 mmol/L 6  7    BUN 5 - 25 mg/dL 12  14    Creatinine 0 60 - 1 30 mg/dL 0 38   0 39CM     Glucose 65 - 140 mg/dL 120  95CM    Calcium 8 3 - 10 1 mg/dL 8 3  9 0    eGFR ml/min/1 73sq m 130  129               Ref Range & Units 4/26/18 0551 4/25/18 0211   WBC 4 31 - 10 16 Thousand/uL 6 07  10 15    RBC 3 81 - 5 12 Million/uL 3 57   4 02    Hemoglobin 11 5 - 15 4 g/dL 9 4   10 5     Hematocrit 34 8 - 46 1 % 30 2   33 4     MCV 82 - 98 fL 85  83    MCH 26 8 - 34 3 pg 26 3   26 1     MCHC 31 4 - 37 4 g/dL 31 1   31 4    RDW 11 6 - 15 1 % 15 9   15 6     MPV 8 9 - 12 7 fL 9 6  9 5    Platelets 225 - 035 Thousands/uL 263  289    nRBC /100 WBCs 0  0    Neutrophils Relative 43 - 75 % 68  70    Lymphocytes Relative 14 - 44 % 20  16    Monocytes Relative 4 - 12 % 8  10    Eosinophils Relative 0 - 6 % 3  3    Basophils Relative 0 - 1 % 1  1    Neutrophils Absolute 1 85 - 7 62 Thousands/µL 4 14  7 17    Lymphocytes Absolute 0 60 - 4 47 Thousands/µL 1 23  1 66    Monocytes Absolute 0 17 - 1 22 Thousand/µL 0 49  0 96    Eosinophils Absolute 0 00 - 0 61 Thousand/µL 0 17  0 28    Basophils Absolute 0 00 - 0 10 Thousands/µL 0 03  0 06                4/24 CT-a/p:   1   Perigastric gas-containing collection arising from the distal esophagus unchanged from prior study   Persistent fistulous tract from the stomach to the anterior abdominal wall with significantly increased amount of air extending to the anterior abdominal wall  2   Decreased left subdiaphragmatic gas collection  3   Multiple bilateral nonobstructing renal calculi     IR tube check - 4//25 - Following contrast injection of the indwelling abdominal tube, contrast migrates in to what appears to be the stomach as well as anteriorly into what appears to be soft tissue gas collection    It appeared as though contrast and air bubbles were emanating from the midline incision into the in place ostomy bag  Lateral images were also obtained to demonstrate the anterior draining pattern        FL upper GI - pending on 4/26      ED Treatment:   Medication Administration from 04/25/2018 0126 to 04/25/2018 1309       Date/Time Order Dose Route Action     04/25/2018 0214 HYDROmorphone (DILAUDID) injection 0 5 mg 0 5 mg Intravenous Given     04/25/2018 0212 ondansetron (ZOFRAN) injection 4 mg 4 mg Intravenous Given     04/25/2018 0210 sodium chloride 0 9 % bolus 1,000 mL 1,000 mL Intravenous New Bag     04/25/2018 0320 fentanyl citrate (PF) 100 MCG/2ML 50 mcg 50 mcg Intravenous Given     04/25/2018 0341 iohexol (OMNIPAQUE) 350 MG/ML injection (MULTI-DOSE) 100 mL 100 mL Intravenous Given     04/25/2018 0533 HYDROmorphone (DILAUDID) injection 1 mg 1 mg Intravenous Given     04/25/2018 0533 ondansetron (ZOFRAN) injection 4 mg 4 mg Intravenous Given     04/25/2018 0717 sodium chloride 0 9 % infusion 100 mL/hr Intravenous New Bag     04/25/2018 0717 heparin (porcine) subcutaneous injection 5,000 Units 5,000 Units Subcutaneous Given     04/25/2018 1222 HYDROmorphone (DILAUDID) injection 1 mg 1 mg Intravenous Given     04/25/2018 1236 lidocaine (PF) (XYLOCAINE-MPF) 1 % injection 20 mL 20 mL Infiltration Given       Past Medical/Surgical History:     Diagnosis    Brain condition    Migraine    Obesity    Papilledema, both eyes    Presence of lumboperitoneal shunt    Rotator cuff tendinitis    Visual field defect     Past Surgical History:   Procedure Laterality Date    CSF SHUNT         LP shunt - 2015 -  shunt - 2017    ESOPHAGOGASTRODUODENOSCOPY N/A 2/23/2018     Procedure: ESOPHAGOGASTRODUODENOSCOPY (EGD) WITH ESOPHAGEAL STENT PLACEMENT;  Surgeon: Jonny Santos MD;  [facility];  Service: Thoracic    ESOPHAGOGASTRODUODENOSCOPY N/A 2/23/2018     Procedure: ESOPHAGOGASTRODUODENOSCOPY (EGD) WITH REMOVAL ESOPHAGEAL STENT  AND REPLACEMENT WITH 23mm X155mm 1111 [address] [place] [address],[address];  Surgeon: Vinh Davis MD;  [facility];  Service: Thoracic    ESOPHAGOGASTRODUODENOSCOPY N/A 3/28/2018     Procedure: ESOPHAGOGASTRODUODENOSCOPY (EGD) with PEJ placement ;  Surgeon: Vandana Soto MD;  [facility]; Service: Gastroenterology    ESOPHAGOGASTRODUODENOSCOPY N/A 4/5/2018     Procedure: ESOPHAGOGASTRODUODENOSCOPY (EGD) with botox injection and kaofed placement;  Surgeon: Daniella Awad DO;  [facility]; Service: Gastroenterology    ESOPHAGOGASTRODUODENOSCOPY N/A 4/10/2018     Procedure: ESOPHAGOGASTRODUODENOSCOPY (EGD) with Kaofed placement;  Surgeon: Jus Camejo MD;  [facility]; Service: Gastroenterology    ESOPHAGOSCOPY WITH STENT INSERTION N/A 1/24/2018     Procedure: INSERTION STENT ESOPHAGEAL;  Surgeon: Elzbieta Rayo MD;  [facility]; Service: Gastroenterology    EYE SURGERY         for "crossed eyed" (in 2nd grade)     GASTRIC BYPASS   2016    HERNIA REPAIR        HYSTERECTOMY        LAPAROTOMY N/A 1/25/2018     Procedure: Exploratory Laparotomy, wash out,placement of drains, placement of NG feeding tube ;   Surgeon: Reese Chau DO;  [facility];  Service: General    TN CREATE SHUNT:VENTRIC-PERITONEAL Right 5/31/2017     Procedure: IMAGE GUIDED CORONAL PLACEMENT OF PROGRAMABLE VENTRICULAR-PERITONEAL SHUNT, REMOVAL OF LP SHUNT ;  Surgeon: Valentin Zamora MD;  [facility];  Service: Neurosurgery    TN Bevelyn Nir CSF SHUNT,W/O REPLACE Right 2/5/2018     Procedure: Removal of  shunt;  Surgeon: Valentin Zamora MD;  [facility];  Service: Neurosurgery    TN REPLACEMENT/REVISION,CSF SHUNT Right 1/25/2018     Procedure: Externalization of right-sided SHUNT VENTRICULAR-PERITONEAL in anterior chest wall ribs two and three level  ;  Surgeon: Shiraz Quinn MD;  [facility];  Service: Neurosurgery             Admitting Diagnosis: Abdominal pain [R10 9]  Gastric leak [K91 89]    Age/Sex: 36 y o  female    Assessment/Plan: Assessment:  79-year-old female status post gastric sleeve with injury near the angle of Hiss during attempted hiatal hernia repair at outside hospital   Admitted with abdominal pain and increased drain output, with a drain position near the known gastric sleeve injury   Patient also has post pyloric keofed      Plan:  1) Gastric perforation              - Botox injection in scar tissue to reduce gastric sleeve pressure performed by GI last admission              - continue drain              - hold tube feeds for now, but was tolerating Jevity 1 2@ 80 x 20 hrs at home w/ 110mL q 4 hr water flushes via [place] keofed                2) Enterocutaneous fistula              - appears to be producing non-sterile air pocket in subcutaneous tissue with increase fascial defect              - may need operative decompression, but in past IR decompression via drain placement has been successful so will plan on IR consult for drain decompression              - no abx at this time              - monitor exam given concern for soft tissue infection seeding w/ gastric contents leaking into closed space     3) DVT prophylaxis              - [facility]              - SCDs     4) Dispo              - med surg              - thoracic c/s       Admission Orders:  Scheduled Meds:   Current Facility-Administered Medications:  heparin (porcine) 5,000 Units Subcutaneous Q8H [address]    HYDROmorphone 0 5 mg Intravenous Q3H PRN    HYDROmorphone 1 mg Intravenous Q3H PRN 4/25 x 3  4/26 x 1   ondansetron 4 mg Intravenous Q6H PRN      D5 0 45 NS  w/ 20 meq KCL @ 100 ml/hr- d/c'd on 4/26 @ 0725       Nursing orders - VSq 4 - I & O q shift  - Incentive Spirometry - SCD's to le's- Ambulate tid - diet enteral - Parenteral - Jevity 1 2  11 pm- 7 am - 84, 100 water q 4      Thoracic Surgery - Proximal gastric perforation  Continue tube feeds  Continue to monitor anterior abdominal wall with serial exams  Continue pigtail catheter to bulb suction  Her long-term plan is to try and feed her and allow her abdomen to settle down over the next month or 2  She will likely need exploration and conversion to a Prerna Y to close her gastric perforation    I spoke to both the patient and her fiance who understand the plan

## 2018-04-26 NOTE — PROGRESS NOTES
Progress Note - Jaquelin Alvarez 1975, 36 y o  female MRN: [id number]    Unit/Bed#: [facility] 611-01 Encounter: [id number]    Primary Care Provider: Herman Pedraza DO   Date and time admitted to hospital: 4/25/2018  1:36 AM        Abdominal wound dehiscence   Assessment & Plan    - Gastric/Enterocutaneous fistula again draining via the midline abdominal wall  - Continue local wound care  - Continue oral analgesic regimen   - Continue nutritional support as above  - Continue outpatient follow-up with the thoracic surgery service  * Gastric perforation ([address] )   Assessment & Plan    - Persistent/chronic gastric perforation following sleeve gastrectomy status post multiple prior operative interventions an endoscopic interventions including stent placement  - Continue to remain NPO indefinitely with nutrition via a nasoenteric feeding tube as follows:  Jevity 1 2 at 84 mL/hour from 11:00 a m  to 7:00 a m  daily with 100 mL water flushes every 4 hours  - Continue local wound care and fistula management as noted below  - Continue ADENIKE drain to bulb suction   - Continued outpatient follow-up with the thoracic surgery and possible bariatric/minimally invasive surgical services  Discharge Summary - General Surgery   Jaquelin Alvarez 36 y o  female MRN: [id number]  Unit/Bed#: [facility] 408-23 Encounter: [id number]    Admission Date: 4/25/2018     Discharge Date: 4/30/2018    Admitting Diagnosis: Abdominal pain [R10 9]  Gastric leak [K91 89]    Discharge Diagnosis: See above  Attending and Service: Dr [facility]  Consulting Physician(s): Dr González Godfrey, Thoracic Surgery  Imaging and Procedures Performed:     Ir Tube Check    Result Date: 4/26/2018  Impression: Impression: The drain appears to be in position and does communicate with the subcutaneous gas pocket as evidenced by contrast extending to the skin surface and into the ostomy bag on lateral view   PLAN: Patient will be taken back to her room  She will be evaluated by the surgical service  Workstation performed: [id number]     Ct Abdomen Pelvis W Contrast    Result Date: 4/25/2018  Impression: 1  Perigastric gas-containing collection arising from the distal esophagus unchanged from prior study  Persistent fistulous tract from the stomach to the anterior abdominal wall with significantly increased amount of air extending to the anterior abdominal wall  2   Decreased left subdiaphragmatic gas collection  3   Multiple bilateral nonobstructing renal calculi Workstation performed: [id number]     Bedside midline abdominal wall incision and drainage on 04/25/2018  Pathology: N/A    Hospital Course: Pauly Rogers is a 59-year-old female presented with abdominal pain  She is well known to the surgical service following a gastric sleeve procedure earlier this year at an outside hospital with subsequent perforation of the gastric sleeve and 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  The fistula had resolved with persistent gastric leak being treated with percutaneous drainage  The patient had completed a state rehab and was receiving nutrition via TPN and nasoenteric tube feeds during her prior inpatient and rehab stays  Currently, she has been at home for approximately 1 week on continued nasoenteric tube feeds  She had developed new abdominal pain and focal bulging from her midline abdominal incision  Common siding with these new symptoms, the output from her drain has increased  On her initial evaluation, she was afebrile with normal vital signs; in his own therapy feeding tube remained in place via 1 of her nares; her abdomen was soft, minimally tender over a focal area of distension and skin tautness without erythema, the abdominal in ADENIKE drain had murky serous fluid in the bulb; the remainder of her exam was unremarkable  Her initial workup included the above-noted imaging studies      She was admitted to the acute care surgery service with persistent gastric leak following sleeve gastrectomy with intra-abdominal abscess and recurrent enterocutaneous fistula  She was kept NPO and continued on her baseline nasoenteric tube feeding  Yuliya Culver is provided some resuscitative IV fluids and an analgesic regimen  She underwent bedside incision and drainage of the abdominal wall wound to allow adequate drainage of her recurrent enterocutaneous fistula  She had local wound care or the enterocutaneous fistula with a drainage being managed with an ostomy appliance  Following drainage, her abdominal pain and leukocytosis improved  Interventional Radiology interrogated her drain which appeared to be in adequate position and demonstrated a persistent fistula  The thoracic surgery service evaluated the patient during her stay as well as they have been following her chronically as an outpatient  They reiterated a continued long-term approach to potentially allow her fistula to heal   They recommended continuing the NPO status and nasoenteric tube feeds with a plan for possible operative intervention in a couple months of her fistula does not resolve  By 4/30/2018, she was deemed stable for discharge with VNA for continued wound care and continued nasoenteric tube feeds  On discharge, the patient is instructed to follow-up with the patient's primary care provider in the next one month to review the events of the recent hospitalization  The patient is instructed to follow-up with the [facility] as needed  She is instructed to follow up with thoracic surgery as previously scheduled  She is follow the remaining provided discharge instructions  Condition at Discharge: good     Discharge instructions/Information to patient and family:   See after visit summary for information provided to patient and family        Provisions for Follow-Up Care:  See after visit summary for information related to follow-up care and any pertinent home health orders  Disposition: See After Visit Summary for discharge disposition information  Planned Readmission: No    Discharge Statement   I spent 30 minutes discharging the patient  This time was spent on the day of discharge  I had direct contact with the patient on the day of discharge  Additional documentation is required if more than 30 minutes were spent on discharge  Discharge Medications:  See after visit summary for reconciled discharge medications provided to patient and family        Rodrigo Vila PA-C  4/26/2018  5:41 PM

## 2018-04-26 NOTE — ASSESSMENT & PLAN NOTE
- Persistent/chronic gastric perforation following sleeve gastrectomy status post multiple prior operative interventions an endoscopic interventions including stent placement  - Continue to remain NPO indefinitely with nutrition via a nasoenteric feeding tube as follows:  Jevity 1 2 at 84 mL/hour from 11:00 a m  to 7:00 a m  daily with 100 mL water flushes every 4 hours  - Continue local wound care and fistula management as noted below  - Continue ADENIKE drain to bulb suction   - Continued outpatient follow-up with the thoracic surgery and possible bariatric/minimally invasive surgical services

## 2018-04-26 NOTE — SOCIAL WORK
Met with pt at bedside to explain CM role  Pt resides with her hsb and 15 yr old son in a ranch home with 2 KAVITA  Pts sig other is her primary contact Tonia Novoa  Pt states she is imdep with ADLs  Pt was recently at [facility]  Pt is open to [address] [facility] for tube feeds  Pts PCP is Dr Arely Bauman in [place]  Pts RX is [facility] in [place]  Pt does not have a POA  Pt states her sig other will transport pt home  CM will follow for dc needs  Patient/caregiver received discharge checklist  Content reviewed  Patient/caregiver encouraged to participate in discharge plan of care prior to discharge home  CM reviewed d/c planning process including the following: identifying help at home, patient preference for d/c planning needs, Discharge Lounge, [facility] Meds to Bed program, availability of treatment team to discuss questions or concerns patient and/or family may have regarding understanding medications and recognizing signs and symptoms once discharged  CM also encouraged patient to follow up with all recommended appointments after discharge  Patient advised of importance for patient and family to participate in managing patients medical well being

## 2018-04-26 NOTE — PROGRESS NOTES
Patient care rounds were completed with the patient's charge nurse today, Rohit Joaquin  We discussed the plan is to keep her NPO and continue naso-enteric tube feeds at goal   Continue IR drain and local wound care with midline abdominal wound manager  We reviewed all of the invasive devices/lines/telemetry orders   - None  Pain Assessment / Plan:  - Continue current analgesic regimen with trial of oral analgesics  Mobility Assessment / Plan:  - Activity as tolerated  Goals / Barriers for discharge:  - Anticipate discharge on 04/27/2018  - Case management following; case and discharge needs discussed  All questions and concerns were addressed  I spent greater than 10 minutes reviewing the plan with the patient and the nurse, and coordinating her care for the day      Baltazar Mcbride PA-C  4/26/2018 12:42 PM

## 2018-04-26 NOTE — ASSESSMENT & PLAN NOTE
- Gastric/Enterocutaneous fistula again draining via the midline abdominal wall  - Continue local wound care  - Continue oral analgesic regimen   - Continue nutritional support as above  - Continue outpatient follow-up with the thoracic surgery service

## 2018-04-27 PROCEDURE — 99024 POSTOP FOLLOW-UP VISIT: CPT | Performed by: SURGERY

## 2018-04-27 PROCEDURE — 99224 PR SBSQ OBSERVATION CARE/DAY 15 MINUTES: CPT | Performed by: THORACIC SURGERY (CARDIOTHORACIC VASCULAR SURGERY)

## 2018-04-27 RX ORDER — OXYCODONE HCL 5 MG/5 ML
5 SOLUTION, ORAL ORAL EVERY 4 HOURS PRN
Status: DISCONTINUED | OUTPATIENT
Start: 2018-04-27 | End: 2018-04-30 | Stop reason: HOSPADM

## 2018-04-27 RX ORDER — OXYCODONE HCL 5 MG/5 ML
10 SOLUTION, ORAL ORAL EVERY 4 HOURS PRN
Status: DISCONTINUED | OUTPATIENT
Start: 2018-04-27 | End: 2018-04-30 | Stop reason: HOSPADM

## 2018-04-27 RX ADMIN — HYDROMORPHONE HYDROCHLORIDE 1 MG: 1 INJECTION, SOLUTION INTRAMUSCULAR; INTRAVENOUS; SUBCUTANEOUS at 10:34

## 2018-04-27 RX ADMIN — HEPARIN SODIUM 5000 UNITS: 5000 INJECTION, SOLUTION INTRAVENOUS; SUBCUTANEOUS at 06:42

## 2018-04-27 RX ADMIN — OXYCODONE HYDROCHLORIDE 10 MG: 5 SOLUTION ORAL at 15:57

## 2018-04-27 RX ADMIN — HEPARIN SODIUM 5000 UNITS: 5000 INJECTION, SOLUTION INTRAVENOUS; SUBCUTANEOUS at 14:20

## 2018-04-27 RX ADMIN — HYDROMORPHONE HYDROCHLORIDE 1 MG: 1 INJECTION, SOLUTION INTRAMUSCULAR; INTRAVENOUS; SUBCUTANEOUS at 07:33

## 2018-04-27 RX ADMIN — HYDROMORPHONE HYDROCHLORIDE 1 MG: 1 INJECTION, SOLUTION INTRAMUSCULAR; INTRAVENOUS; SUBCUTANEOUS at 02:46

## 2018-04-27 RX ADMIN — OXYCODONE HYDROCHLORIDE 10 MG: 5 SOLUTION ORAL at 22:10

## 2018-04-27 NOTE — PHYSICIAN ADVISOR
This patient is a 36 y o  y/o female who is admitted to the hospital for Abdominal Pain (severe abdominal pain for 2 days, has a perforated stomach, pt is getting tube feeds and abdomen is "swelling", no fever or chills, no N/V)       The patient presented to the ED on 4/25/18 at [address] [place] [address] and was admitted to the hospital on 4/25/2018 0136  History of Present Illness includes: 25-year-old female status post gastric sleeve with injury near the angle of Hiss during attempted hiatal hernia repair at outside hospital   Admitted with abdominal pain and increased drain output, with a drain position near the known gastric sleeve injury  Patient also has post pyloric keofed  Vital signs in the ER are as follows ED Triage Vitals [04/25/18 0131]   Temperature Pulse Respirations Blood Pressure SpO2   98 3 °F (36 8 °C) 88 18 108/62 97 %      Temp Source Heart Rate Source Patient Position - Orthostatic VS BP Location FiO2 (%)   Oral Monitor Sitting Left arm --      Pain Score       8           The patient is admitted as OBSERVATION and has remained hospitalized for 0 day(s)  Last vital signs were Blood pressure 92/58, pulse 76, temperature 98 4 °F (36 9 °C), temperature source Oral, resp  rate 18, height 5' 4" (1 626 m), weight 54 5 kg (120 lb 3 2 oz), SpO2 97 %, not currently breastfeeding  Treatment includes: wound care, hold TFs, open abdominal gas collection under local anesthesia, place ostomy collection device, continued indwelling pigtail catheter and wound drainage bag         Results include:       Results from last 7 days  Lab Units 04/25/18  0211   LACTIC ACID mmol/L 0 7         Results from last 7 days  Lab Units 04/26/18  0551 04/25/18  0211   WBC Thousand/uL 6 07 10 15   HEMOGLOBIN g/dL 9 4* 10 5*   HEMATOCRIT % 30 2* 33 4*   PLATELETS Thousands/uL 263 289         Results from last 7 days  Lab Units 04/26/18  0551 04/25/18  0211   SODIUM mmol/L 136 136   POTASSIUM mmol/L 4 2 4 5   CHLORIDE mmol/L 103 101   CO2 mmol/L 27 28   BUN mg/dL 12 14   CREATININE mg/dL 0 38* 0 39*   GLUCOSE RANDOM mg/dL 120 95   CALCIUM mg/dL 8 3 9 0       Recent Cultures: The patient is appropriate for  Observation Status  The rationale is as follows: The patient is a 37 y/o female who had gastric perforation during gastric sleeve procedure and now presented with increased abd pain and increased drain output  She required wound care, hold TFs, open abdominal gas collection under local anesthesia, place ostomy collection device, continued indwelling pigtail catheter and wound drainage bag  She had stable vitals and labs on admission  She was not given IV abx  No IR procedure was completed  There is a note stating plan for discharge today  After review of the medical chart, labs, imaging, and notes - I agree that the patient meets criteria for OBSERVATION ADMISSION  She has had low severity of illness and intensity of service during her hospitalization

## 2018-04-27 NOTE — RESTORATIVE TECHNICIAN NOTE
Restorative Specialist Mobility Note       Activity: Ambulate in fuchs, Ambulate in room, Bathroom privileges, Dangle, Stand at bedside (Educated/encouraged pt to ambulate with assistance 3-4 x's/day  Pt callbell, phone/tray within reach )     Assistive Device: None                       Anti-Embolism Device On:  Bilateral, Sequential compression devices, below knee     Amy Band BS, Restorative Technician, [employer]

## 2018-04-27 NOTE — PROGRESS NOTES
Progress Note - General Surgery   Blas Vazquez 36 y o  female MRN: [id number]  Unit/Bed#: 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  -ADENIKE drain 285  Both look like enteric contents  -afebrile  Tolerating goal feeds     Plan:    Anticipate d/c today    Subjective/Objective     Subjective:      Objective:     Vitals: Blood pressure 92/58, pulse 76, temperature 98 4 °F (36 9 °C), temperature source Oral, resp  rate 18, height 5' 4" (1 626 m), weight 54 5 kg (120 lb 3 2 oz), SpO2 97 %, not currently breastfeeding  ,Body mass index is 20 63 kg/m²  I/O       04/24 0701 - 04/25 0700 04/25 0701 - 04/26 0700    P  O   0    I V  (mL/kg)  1626 7 (29 8)    NG/GT  890    IV Piggyback 1000     Total Intake(mL/kg) 1000 (19) 2516 7 (46 2)    Urine (mL/kg/hr)  1225 (0 9)    Drains  255 (0 2)    Other  100 (0 1)    Stool  0 (0)    Total Output   1580    Net +1000 +936 7          Unmeasured Stool Occurrence  0 x          Physical Exam:  GEN: NAD  HEENT: MMM  CV: RRR  Lung: Normal effort  Ab: Soft, NT/ND, wound manager with dark brown drainage  Extrem: No CCE  Neuro:  A+Ox3    Lab, Imaging and other studies:   CBC with diff:   No results found for: WBC, HGB, HCT, MCV, PLT, ADJUSTEDWBC, MCH, MCHC, RDW, MPV, NRBC, BMP/CMP: No results found for: NA, K, CL, CO2, ANIONGAP, BUN, CREATININE, GLUCOSE, CALCIUM, AST, ALT, ALKPHOS, PROT, ALBUMIN, BILITOT, EGFR, Magnesium: No results found for: MAG  VTE Pharmacologic Prophylaxis: Heparin  VTE Mechanical Prophylaxis: sequential compression device

## 2018-04-27 NOTE — SOCIAL WORK
Per red sx, pt is for discharge today  Ecin notification sent to both [address] [facility]  Confirmed w pt & family in room that supplies for TF were to be delivered yesterday

## 2018-04-27 NOTE — PROGRESS NOTES
Pt is resting in bed, her pain has improved and is now 6/10  IV WDL  TF is running on a kangaroo pump; Jevity 1 2; no complaints at this time, will continue to monitor

## 2018-04-27 NOTE — PROGRESS NOTES
Progress Note - Thoracic  Deborah Rao 36 y o  female MRN: [id number]  Unit/Bed#: [facility] 611-01 Encounter: [id number]    Assessment:  36y o -year-old female s/p gastric sleeve with injury near the angle of Hiss during attempted hiatal hernia repair at outside hospital   Admitted with abdominal pain and increased drain output, with a drain position near the known gastric sleeve injury  Patient also has post pyloric keofed  Plan:  1) Gastric perforation              - Botox injection in scar tissue to reduce gastric sleeve pressure performed by GI last admission              - continue drain   - continue midline wound mgr              - tube feeds Jevity 1 2@ 84 x 20 hrs at home w/ 110mL q 4 hr water flushes via [place] keofed tolerated, will change to 16-hr cycle @ 105 mL/hr                2) Enterocutaneous fistula              - appears to be producing non-sterile air pocket in subcutaneous tissue with increase fascial defect              - cont wound mgr per general surgery     3) DVT prophylaxis              - SQH              - SCDs    4) Disposition   - d/c per general surgery, f/u thoracic outpt    Franklin Vásquez MD PGY-4  7:47 AM  04/27/18      Subjective:  Feels better after midline incision opened  Objective:  Patient Vitals for the past 24 hrs:   BP Temp Temp src Pulse Resp SpO2 Weight   04/27/18 0706 92/58 98 4 °F (36 9 °C) Oral 76 18 97 % -   04/26/18 2322 95/55 98 4 °F (36 9 °C) Oral 76 18 98 % -   04/26/18 1539 98/53 98 7 °F (37 1 °C) Oral 87 18 - -          Diet Orders            Start     Ordered    04/27/18 0659  Diet Enteral/Parenteral; Tube Feeding No Oral Diet; Jevity 1 2 Claude; 5707-5959 (3pm-7am) (16 hour cycle); 105; 100;  Water; Every 4 hours  Diet effective now     Question Answer Comment   Diet Type Enteral/Parenteral    Enteral/Parenteral Tube Feeding No Oral Diet    Tube Feeding Formula: Jevity 1 2 Claude    Tube Feeding Cyclic (start / stop time): 8476-4817 (3pm-7am) (16 hour cycle)    Tube Feeding Cyclic Rate (mL/hr): 316    Tube Feeding water flush (mL): 100    Water Flush type: Water    Water flush frequency: Every 4 hours    RD to adjust diet per protocol?  Yes        04/27/18 0659        Intake/Output Summary (Last 24 hours) at 04/27/18 0747  Last data filed at 04/27/18 0242   Gross per 24 hour   Intake             1894 ml   Output             2655 ml   Net             -761 ml   UOP 2 2  Midline wound mgr 285 serosanginous + gas  ADENIKE 245 serosanguinous    Physical Exam:  General: NAD  Cardiovascular: RRR  Respiratory: breath sounds b/l  Abdomen: soft, NT, ND w/ opening midline +gas / serosanguinous fluid  Extremities: no edema    Medications:    Current Facility-Administered Medications:  acetaminophen 650 mg Oral Q6H PRN Kinsey Salinas PA-C   heparin (porcine) 5,000 Units Subcutaneous Q8H [address] Dalbert Palacio Wiggins   HYDROmorphone 0 5 mg Intravenous Q3H PRN Oscar Subramanian MD   HYDROmorphone 1 mg Intravenous Q3H PRN Oscar Subramanian MD   ondansetron 4 mg Intravenous Q6H PRN Rinda Fass      acetaminophen 650 mg Q6H PRN   HYDROmorphone 0 5 mg Q3H PRN   HYDROmorphone 1 mg Q3H PRN   ondansetron 4 mg Q6H PRN     Laboratory results:   CBC: No results found for: WBC, HGB, HCT, MCV, PLT, ADJUSTEDWBC, MCH, MCHC, RDW, MPV, NRBC, CMP: No results found for: NA, K, CL, CO2, ANIONGAP, BUN, CREATININE, GLUCOSE, CALCIUM, AST, ALT, ALKPHOS, PROT, ALBUMIN, BILITOT, EGFR, Coagulation: No results found for: PT, INR, APTT, Urinalysis: No results found for: COLORU, CLARITYU, SPECGRAV, PHUR, LEUKOCYTESUR, NITRITE, PROTEINUA, GLUCOSEU, KETONESU, BILIRUBINUR, BLOODU, Amylase: No results found for: AMYLASE, Lipase: No results found for: LIPASE    VTE Pharmacologic Prophylaxis: Heparin  VTE Mechanical Prophylaxis: sequential compression device

## 2018-04-27 NOTE — SOCIAL WORK
Per David Nova from red sx, pt not cleared for discharge today  Updated ecin messages sent to [facility] to notify of cancellation of discharge

## 2018-04-27 NOTE — CONSULTS
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 in the past with an injury near the angle of His  Patient seen for fistula management  Findings:  1-Small opening to mid upper abdomen along incisional line measuring 2 5cm x 1 2cm with ongoing frothy yellow drainage  Perifistular skin is intact, intact one piece ostomy pouch in place  Gastrocutaneous Fistula management Plan:  1-Cleanse perifistular skin with warm water  2-Fill un-even skin area to belly button with asher to make flat/even surface  (# [id number] for clean utility)  3-Skin prep entire perifistular with 3M [place] No string barrier Film  Kristel Sinks 3-Apply 6 9" x 4 3" wound manager (# [id number]) from clean utility  4-Apply warm pad/compress over new wound mange for 5 minutes  5-Change weekly or if leaking  Vitals: Blood pressure 92/58, pulse 76, temperature 98 4 °F (36 9 °C), temperature source Oral, resp  rate 18, height 5' 4" (1 626 m), weight 54 5 kg (120 lb 3 2 oz), SpO2 97 %, not currently breastfeeding  ,Body mass index is 20 63 kg/m²  Incision 04/25/18 Abdomen Mid (Active)   Incision Description Clean;Dry 4/27/2018  8:45 AM   Krista-wound Assessment Clean;Dry; Intact 4/27/2018  8:45 AM   Closure Other (Comment) 4/26/2018 11:22 PM   Drainage Amount Small 4/26/2018 11:22 PM   Drainage Description Brown;Clear 4/27/2018  8:45 AM   Dressing Other (Comment) 4/26/2018 11:22 PM   Wound packed? No 4/26/2018  8:00 PM   Dressing Status Clean;Dry; Intact 4/27/2018  8:45 AM       Wound care to continue following if patient remains admitted, please call ext [phone] or [phone] with questions or concerns      Siri Reynaga, RN, BSN, [employer]

## 2018-04-27 NOTE — DISCHARGE INSTR - OTHER ORDERS
Gastrocutaneous Fistula management:  1-Cleanse perifistular skin with warm water  2-Fill un-even skin area to belly button with asher ring to make flat/even surface  Ref# [id number]  3-Skin prep to entire perifistular  3-Apply 6 9' x 4 3" Asher wound/fistula manager  Ref # [id number]  4-Apply warm pad over new wound mange for 5 minutes to increase adherance  5-Change weekly or when leaking

## 2018-04-28 LAB
ANION GAP SERPL CALCULATED.3IONS-SCNC: 5 MMOL/L (ref 4–13)
BASOPHILS # BLD AUTO: 0.04 THOUSANDS/ΜL (ref 0–0.1)
BASOPHILS NFR BLD AUTO: 1 % (ref 0–1)
BUN SERPL-MCNC: 16 MG/DL (ref 5–25)
CALCIUM SERPL-MCNC: 8.9 MG/DL (ref 8.3–10.1)
CHLORIDE SERPL-SCNC: 99 MMOL/L (ref 100–108)
CO2 SERPL-SCNC: 32 MMOL/L (ref 21–32)
CREAT SERPL-MCNC: 0.45 MG/DL (ref 0.6–1.3)
EOSINOPHIL # BLD AUTO: 0.28 THOUSAND/ΜL (ref 0–0.61)
EOSINOPHIL NFR BLD AUTO: 4 % (ref 0–6)
ERYTHROCYTE [DISTWIDTH] IN BLOOD BY AUTOMATED COUNT: 15.8 % (ref 11.6–15.1)
GFR SERPL CREATININE-BSD FRML MDRD: 123 ML/MIN/1.73SQ M
GLUCOSE SERPL-MCNC: 114 MG/DL (ref 65–140)
HCT VFR BLD AUTO: 30.4 % (ref 34.8–46.1)
HGB BLD-MCNC: 9.7 G/DL (ref 11.5–15.4)
LYMPHOCYTES # BLD AUTO: 1.25 THOUSANDS/ΜL (ref 0.6–4.47)
LYMPHOCYTES NFR BLD AUTO: 19 % (ref 14–44)
MCH RBC QN AUTO: 26.2 PG (ref 26.8–34.3)
MCHC RBC AUTO-ENTMCNC: 31.9 G/DL (ref 31.4–37.4)
MCV RBC AUTO: 82 FL (ref 82–98)
MONOCYTES # BLD AUTO: 0.69 THOUSAND/ΜL (ref 0.17–1.22)
MONOCYTES NFR BLD AUTO: 10 % (ref 4–12)
NEUTROPHILS # BLD AUTO: 4.48 THOUSANDS/ΜL (ref 1.85–7.62)
NEUTS SEG NFR BLD AUTO: 66 % (ref 43–75)
NRBC BLD AUTO-RTO: 0 /100 WBCS
PLATELET # BLD AUTO: 212 THOUSANDS/UL (ref 149–390)
PMV BLD AUTO: 9.3 FL (ref 8.9–12.7)
POTASSIUM SERPL-SCNC: 4.2 MMOL/L (ref 3.5–5.3)
RBC # BLD AUTO: 3.7 MILLION/UL (ref 3.81–5.12)
SODIUM SERPL-SCNC: 136 MMOL/L (ref 136–145)
WBC # BLD AUTO: 6.75 THOUSAND/UL (ref 4.31–10.16)

## 2018-04-28 PROCEDURE — 99024 POSTOP FOLLOW-UP VISIT: CPT | Performed by: SURGERY

## 2018-04-28 PROCEDURE — 85025 COMPLETE CBC W/AUTO DIFF WBC: CPT | Performed by: STUDENT IN AN ORGANIZED HEALTH CARE EDUCATION/TRAINING PROGRAM

## 2018-04-28 PROCEDURE — 80048 BASIC METABOLIC PNL TOTAL CA: CPT | Performed by: STUDENT IN AN ORGANIZED HEALTH CARE EDUCATION/TRAINING PROGRAM

## 2018-04-28 RX ORDER — SENNOSIDES 8.8 MG/5ML
17.6 LIQUID ORAL
Status: DISCONTINUED | OUTPATIENT
Start: 2018-04-28 | End: 2018-04-30 | Stop reason: HOSPADM

## 2018-04-28 RX ORDER — SENNOSIDES 8.8 MG/5ML
8.8 LIQUID ORAL
Status: DISCONTINUED | OUTPATIENT
Start: 2018-04-28 | End: 2018-04-28

## 2018-04-28 RX ADMIN — OXYCODONE HYDROCHLORIDE 10 MG: 5 SOLUTION ORAL at 20:16

## 2018-04-28 RX ADMIN — SENNOSIDES A AND B 17.6 MG: 415.36 LIQUID ORAL at 21:17

## 2018-04-28 RX ADMIN — OXYCODONE HYDROCHLORIDE 10 MG: 5 SOLUTION ORAL at 15:01

## 2018-04-28 RX ADMIN — OXYCODONE HYDROCHLORIDE 10 MG: 5 SOLUTION ORAL at 09:53

## 2018-04-28 RX ADMIN — OXYCODONE HYDROCHLORIDE 10 MG: 5 SOLUTION ORAL at 04:24

## 2018-04-28 RX ADMIN — HEPARIN SODIUM 5000 UNITS: 5000 INJECTION, SOLUTION INTRAVENOUS; SUBCUTANEOUS at 15:01

## 2018-04-28 RX ADMIN — HEPARIN SODIUM 5000 UNITS: 5000 INJECTION, SOLUTION INTRAVENOUS; SUBCUTANEOUS at 21:17

## 2018-04-28 RX ADMIN — SENNOSIDES A AND B 17.6 MG: 415.36 LIQUID ORAL at 09:53

## 2018-04-28 NOTE — PROGRESS NOTES
Made red surgery team aware of patients pain, possibly contributed from constipation  Will continue to monitor

## 2018-04-28 NOTE — CASE MANAGEMENT
Continued Stay Review    Date: 4/27/2018  CC: Complains of pain at gastrocutneous fistula site - drained 100 mL/24 hours  Continue enteral tube feeds, NPO, monitor ADENIKE and fistula drainage outputs  Consult Wound Care for fistula management  Vital Signs: BP 98/54 (BP Location: Left arm)   Pulse 83   Temp 97 9 °F (36 6 °C) (Oral)   Resp 18   Ht 5' 4" (1 626 m)   Wt 54 5 kg (120 lb 3 2 oz)   LMP  (LMP Unknown)   SpO2 99%   BMI 20 63 kg/m²     Medications:   Scheduled Meds:   Current Facility-Administered Medications:  acetaminophen 650 mg Oral Q6H PRN Shad TIN Ruggiero-ANNE   heparin (porcine) 5,000 Units Subcutaneous Q8H [address] Leidy Feliciano   ondansetron 4 mg Intravenous Q6H PRN [employer]   oxyCODONE 10 mg Oral Q4H PRN TIN Mckay-ANNE   oxyCODONE 5 mg Oral Q4H PRN TIN Mckay-ANNE   senna 8 8 mg Per NG Tube HS Lalita Mack MD     Continuous Infusions:    PRN Meds:   acetaminophen    ondansetron    oxyCODONE - used x 2      oxyCODONE  Dilaudid 1 mg iv - used x 3 (0246; 4560; 1034)    Abnormal Labs/Diagnostic Results: none for 4/27    Age/Sex: 36 y o  female     Assessment/Plan:  Per physician advisor: The patient is appropriate for  Observation Status  The rationale is as follows: The patient is a 35 y/o female who had gastric perforation during gastric sleeve procedure and now presented with increased abd pain and increased drain output  She required wound care, hold TFs, open abdominal gas collection under local anesthesia, place ostomy collection device, continued indwelling pigtail catheter and wound drainage bag  She had stable vitals and labs on admission  She was not given IV abx  No IR procedure was completed  There is a note stating plan for discharge today  After review of the medical chart, labs, imaging, and notes - I agree that the patient meets criteria for OBSERVATION ADMISSION  She has had low severity of illness and intensity of service during her hospitalization  Discharge Plan: To be determined  Thank you,  [facility] in the [facility] by Donald Villa for 2017  Network Utilization Review Department  Phone: [phone]; Fax [phone]  ATTENTION: The Network Utilization Review Department is now centralized for our 7 Facilities  Make a note that we have a new phone and fax numbers for our Department  Please call with any questions or concerns to [phone] and carefully follow the prompts so that you are directed to the right person  All voicemails are confidential  Fax any determinations, approvals, denials, and requests for initial or continue stay review clinical to [phone]  Due to HIGH CALL volume, it would be easier if you could please send faxed requests to expedite your requests and in part, help us provide discharge notifications faster

## 2018-04-28 NOTE — PROGRESS NOTES
Spoke with red surgery about patient reporting "a new lump" on left side of abdomen  Patient was given pain medication  Team is coming to floor to take a look at patient  Will continue to monitor

## 2018-04-28 NOTE — CASE MANAGEMENT
Continued Stay Review    Date: 4/28/18   OBSERVATION    Vital Signs: /56 (BP Location: Left arm)   Pulse 77   Temp 98 4 °F (36 9 °C) (Oral)   Resp 18   Ht 5' 4" (1 626 m)   Wt 54 5 kg (120 lb 3 2 oz)   LMP  (LMP Unknown)   SpO2 94%   BMI 20 63 kg/m²     Medications:   Scheduled Meds:   Current Facility-Administered Medications:  acetaminophen 650 mg Oral Q6H PRN Jose M Antoine PA-C   heparin (porcine) 5,000 Units Subcutaneous Q8H [facility] Leidy Wiggins   ondansetron 4 mg Intravenous Q6H PRN Toney Pen   oxyCODONE 10 mg Oral Q4H PRN Barrie Wilcox PA-C   oxyCODONE 5 mg Oral Q4H PRN Barrie Wilcox PA-C   senna 17 6 mg Per NG Tube HS Patrick Maldonado MD   PRN Meds:   acetaminophen    ondansetron    oxyCODONE    Abnormal Labs/Diagnostic Results: 4/28: cl 99   Creat   45   hgb 9 7   hct 30 4       Age/Sex: 36 y o  female     Assessment/Plan: 36y o -year-old female s/p gastric sleeve with injury near the angle of Hiss during attempted hiatal hernia repair at outside hospital   Admitted with abdominal pain and increased drain output, with a drain position near the known gastric sleeve injury  Patient also has post pyloric keofed        -pt reports pain much worse  -pt reports that she is noticing more lumps over abdomen but there are no skin changes     Plan:  - NPO  - continue IR drain to suction  - continue wound manager to midline  - TF at 16 hour cycled  - SQH/SCDs  - consider repeat CTAP to eval for new subcutaneous collection  Discharge Plan: to be determined

## 2018-04-28 NOTE — PROGRESS NOTES
Progress Note - Thoracic  Rodrigo Arthur 36 y o  female MRN: [id number]  Unit/Bed#: [facility] 611-01 Encounter: [id number]    Assessment:  36y o -year-old female s/p gastric sleeve with injury near the angle of Hiss during attempted hiatal hernia repair at outside hospital   Admitted with abdominal pain and increased drain output, with a drain position near the known gastric sleeve injury  Patient also has post pyloric keofed  Plan:  - NPO  - continue IR drain to suction  - continue wound manager to midline  - TF at 16 hour cycled  - SQH/SCDs  - rest of care/dispo per red surgery      Subjective: increased pain overnight since IV pain meds stopped, complains of new lump in LLQ that feels like her midline fistula before it opened up  Very anxious about new lumps    Objective:  Patient Vitals for the past 24 hrs:   BP Temp Temp src Pulse Resp SpO2   04/28/18 0733 101/55 98 5 °F (36 9 °C) Oral 91 20 96 %   04/27/18 2320 98/54 97 9 °F (36 6 °C) Oral 83 18 99 %   04/27/18 2210 - - - - - 96 %   04/27/18 1557 97/55 99 3 °F (37 4 °C) Oral 76 18 96 %          Diet Orders            Start     Ordered    04/27/18 0659  Diet Enteral/Parenteral; Tube Feeding No Oral Diet; Jevity 1 2 Claude; 3957-7643 (3pm-7am) (16 hour cycle); 105; 100; Water; Every 4 hours  Diet effective now     Question Answer Comment   Diet Type Enteral/Parenteral    Enteral/Parenteral Tube Feeding No Oral Diet    Tube Feeding Formula: Jevity 1 2 Claude    Tube Feeding Cyclic (start / stop time): 2970-0238 (3pm-7am) (16 hour cycle)    Tube Feeding Cyclic Rate (mL/hr): 797    Tube Feeding water flush (mL): 100    Water Flush type: Water    Water flush frequency: Every 4 hours    RD to adjust diet per protocol?  Yes        04/27/18 0659          Intake/Output Summary (Last 24 hours) at 04/28/18 0748  Last data filed at 04/28/18 0401   Gross per 24 hour   Intake             1510 ml   Output             1875 ml   Net             -365 ml     Physical Exam:  NAD  Norm resp effort on RA  RRR  Abd soft, tender in LLQ, ND, midline IR drain with seropurulence, midline wound manager with same and some air      Medications:    Current Facility-Administered Medications:  acetaminophen 650 mg Oral Q6H PRN Xu Foote PA-C   heparin (porcine) 5,000 Units Subcutaneous Q8H [address] Leidy Wiggins   ondansetron 4 mg Intravenous Q6H PRN Elizabeth Fuel   oxyCODONE 10 mg Oral Q4H PRN Jocelyn CoatAURELIO ribera   oxyCODONE 5 mg Oral Q4H PRN Jocelyn High[place] LakeAURELIO ribera   senna 17 6 mg Per NG Tube HS Buck Quiles MD        acetaminophen 650 mg Q6H PRN   ondansetron 4 mg Q6H PRN   oxyCODONE 10 mg Q4H PRN   oxyCODONE 5 mg Q4H PRN     Laboratory results:   CBC: No results found for: WBC, HGB, HCT, MCV, PLT, ADJUSTEDWBC, MCH, MCHC, RDW, MPV, NRBC, CMP: No results found for: NA, K, CL, CO2, ANIONGAP, BUN, CREATININE, GLUCOSE, CALCIUM, AST, ALT, ALKPHOS, PROT, ALBUMIN, BILITOT, EGFR, Coagulation: No results found for: PT, INR, APTT, Urinalysis: No results found for: COLORU, CLARITYU, SPECGRAV, PHUR, LEUKOCYTESUR, NITRITE, PROTEINUA, GLUCOSEU, KETONESU, BILIRUBINUR, BLOODU, Amylase: No results found for: AMYLASE, Lipase: No results found for: LIPASE    VTE Pharmacologic Prophylaxis: Heparin  VTE Mechanical Prophylaxis: sequential compression device

## 2018-04-28 NOTE — PROGRESS NOTES
Progress Note - General Surgery   Lolita Macdonald 36 y o  female MRN: [id number]  Unit/Bed#: [facility] 611-01 Encounter: [id number]    Assessment:  66 yo F with hx gastric leak at GE junction following attempted paraesophageal hernia repair at [facility] 1/2018, tube feed dependent, now with abdominal pain, abdominal wound distention, and increased drain output    -pt reports pain much worse  -pt reports that she is noticing more lumps over abdomen but there are no skin changes  -tolerating goal feeds  -midlien fistula 180  -hayder drain 190    Plan:    -start bowel regimen  Continue to monitor abdominal exams  -trend leukocytosis  -reimaging if febrile or worsening leukocytosis     Subjective/Objective     Subjective:      Objective:     Vitals: Blood pressure 101/55, pulse 91, temperature 98 5 °F (36 9 °C), temperature source Oral, resp  rate 20, height 5' 4" (1 626 m), weight 54 5 kg (120 lb 3 2 oz), SpO2 96 %, not currently breastfeeding  ,Body mass index is 20 63 kg/m²  I/O       04/24 0701 - 04/25 0700 04/25 0701 - 04/26 0700    P  O   0    I V  (mL/kg)  1626 7 (29 8)    NG/GT  890    IV Piggyback 1000     Total Intake(mL/kg) 1000 (19) 2516 7 (46 2)    Urine (mL/kg/hr)  1225 (0 9)    Drains  255 (0 2)    Other  100 (0 1)    Stool  0 (0)    Total Output   1580    Net +1000 +936 7          Unmeasured Stool Occurrence  0 x          Physical Exam:  GEN: NAD  HEENT: MMM  CV: RRR  Lung: Normal effort  Ab: Soft, NT/ND, wound manager with dark brown drainage  Extrem: No CCE  Neuro:  A+Ox3    Lab, Imaging and other studies:   CBC with diff:   No results found for: WBC, HGB, HCT, MCV, PLT, ADJUSTEDWBC, MCH, MCHC, RDW, MPV, NRBC, BMP/CMP: No results found for: NA, K, CL, CO2, ANIONGAP, BUN, CREATININE, GLUCOSE, CALCIUM, AST, ALT, ALKPHOS, PROT, ALBUMIN, BILITOT, EGFR, Magnesium: No results found for: MAG  VTE Pharmacologic Prophylaxis: Heparin  VTE Mechanical Prophylaxis: sequential compression device

## 2018-04-28 NOTE — PROGRESS NOTES
Progress Note - General Surgery  Zainab Russell 36 y o  female MRN: [id number]  Unit/Bed#: [facility] 611-01 Encounter: [id number]    Assessment:  36y o -year-old female s/p gastric sleeve with injury near the angle of Hiss during attempted hiatal hernia repair at outside hospital   Admitted with abdominal pain and increased drain output, with a drain position near the known gastric sleeve injury  Patient also has post pyloric keofed  Plan:  - NPO  - consider repeat CTAP to eval for new subcutaneous collection  - continue IR drain to suction  - continue wound manager to midline  - TF at 16 hour cycled  - SQH/SCDs  - rest of care/dispo per red surgery      Subjective: increased pain overnight since IV pain meds stopped, complains of new lump in LLQ that feels like her midline fistula before it opened up  Very anxious about new lumps    Objective:  Patient Vitals for the past 24 hrs:   BP Temp Temp src Pulse Resp SpO2   04/28/18 0733 101/55 98 5 °F (36 9 °C) Oral 91 20 96 %   04/27/18 2320 98/54 97 9 °F (36 6 °C) Oral 83 18 99 %   04/27/18 2210 - - - - - 96 %   04/27/18 1557 97/55 99 3 °F (37 4 °C) Oral 76 18 96 %          Diet Orders            Start     Ordered    04/27/18 0659  Diet Enteral/Parenteral; Tube Feeding No Oral Diet; Jevity 1 2 Claude; 2554-8178 (3pm-7am) (16 hour cycle); 105; 100; Water; Every 4 hours  Diet effective now     Question Answer Comment   Diet Type Enteral/Parenteral    Enteral/Parenteral Tube Feeding No Oral Diet    Tube Feeding Formula: Jevity 1 2 Claude    Tube Feeding Cyclic (start / stop time): 7279-5749 (3pm-7am) (16 hour cycle)    Tube Feeding Cyclic Rate (mL/hr): 179    Tube Feeding water flush (mL): 100    Water Flush type: Water    Water flush frequency: Every 4 hours    RD to adjust diet per protocol?  Yes        04/27/18 0659          Intake/Output Summary (Last 24 hours) at 04/28/18 0757  Last data filed at 04/28/18 0401   Gross per 24 hour   Intake             1510 ml   Output 1875 ml   Net             -365 ml     Physical Exam:  NAD  Norm resp effort on RA  RRR  Abd soft, tender in LLQ, ND, midline IR drain with seropurulence, midline wound manager with same and some air      Medications:    Current Facility-Administered Medications:  acetaminophen 650 mg Oral Q6H PRN Ranjit Tanner PA-C   heparin (porcine) 5,000 Units Subcutaneous Q8H [facility] Leidy Wiggins   ondansetron 4 mg Intravenous Q6H PRN Geroge Newer   oxyCODONE 10 mg Oral Q4H PRN Cesar Thomas PA-C   oxyCODONE 5 mg Oral Q4H PRN Cesar Thomas PA-C   senna 17 6 mg Per NG Tube HS Krista Swain MD        acetaminophen 650 mg Q6H PRN   ondansetron 4 mg Q6H PRN   oxyCODONE 10 mg Q4H PRN   oxyCODONE 5 mg Q4H PRN     Laboratory results:   CBC: No results found for: WBC, HGB, HCT, MCV, PLT, ADJUSTEDWBC, MCH, MCHC, RDW, MPV, NRBC, CMP: No results found for: NA, K, CL, CO2, ANIONGAP, BUN, CREATININE, GLUCOSE, CALCIUM, AST, ALT, ALKPHOS, PROT, ALBUMIN, BILITOT, EGFR, Coagulation: No results found for: PT, INR, APTT, Urinalysis: No results found for: COLORU, CLARITYU, SPECGRAV, PHUR, LEUKOCYTESUR, NITRITE, PROTEINUA, GLUCOSEU, KETONESU, BILIRUBINUR, BLOODU, Amylase: No results found for: AMYLASE, Lipase: No results found for: LIPASE    VTE Pharmacologic Prophylaxis: Heparin  VTE Mechanical Prophylaxis: sequential compression device

## 2018-04-29 ENCOUNTER — APPOINTMENT (INPATIENT)
Dept: RADIOLOGY | Facility: HOSPITAL | Age: 43
DRG: 951 | End: 2018-04-29
Payer: COMMERCIAL

## 2018-04-29 PROCEDURE — 99024 POSTOP FOLLOW-UP VISIT: CPT | Performed by: SURGERY

## 2018-04-29 PROCEDURE — 76705 ECHO EXAM OF ABDOMEN: CPT

## 2018-04-29 RX ORDER — POLYETHYLENE GLYCOL 3350 17 G/17G
17 POWDER, FOR SOLUTION ORAL DAILY
Status: DISCONTINUED | OUTPATIENT
Start: 2018-04-29 | End: 2018-04-30 | Stop reason: HOSPADM

## 2018-04-29 RX ORDER — MAGNESIUM CARB/ALUMINUM HYDROX 105-160MG
296 TABLET,CHEWABLE ORAL ONCE
Status: DISCONTINUED | OUTPATIENT
Start: 2018-04-29 | End: 2018-04-29

## 2018-04-29 RX ADMIN — POLYETHYLENE GLYCOL 3350 17 G: 17 POWDER, FOR SOLUTION ORAL at 19:37

## 2018-04-29 RX ADMIN — HEPARIN SODIUM 5000 UNITS: 5000 INJECTION, SOLUTION INTRAVENOUS; SUBCUTANEOUS at 21:27

## 2018-04-29 RX ADMIN — SENNOSIDES A AND B 17.6 MG: 415.36 LIQUID ORAL at 21:27

## 2018-04-29 RX ADMIN — OXYCODONE HYDROCHLORIDE 10 MG: 5 SOLUTION ORAL at 14:52

## 2018-04-29 RX ADMIN — OXYCODONE HYDROCHLORIDE 10 MG: 5 SOLUTION ORAL at 19:37

## 2018-04-29 RX ADMIN — HEPARIN SODIUM 5000 UNITS: 5000 INJECTION, SOLUTION INTRAVENOUS; SUBCUTANEOUS at 14:30

## 2018-04-29 RX ADMIN — HEPARIN SODIUM 5000 UNITS: 5000 INJECTION, SOLUTION INTRAVENOUS; SUBCUTANEOUS at 05:23

## 2018-04-29 RX ADMIN — OXYCODONE HYDROCHLORIDE 10 MG: 5 SOLUTION ORAL at 01:49

## 2018-04-29 RX ADMIN — OXYCODONE HYDROCHLORIDE 10 MG: 5 SOLUTION ORAL at 10:58

## 2018-04-29 RX ADMIN — OXYCODONE HYDROCHLORIDE 10 MG: 5 SOLUTION ORAL at 06:24

## 2018-04-29 NOTE — PHYSICIAN ADVISOR
Current patient class: Observation  The patient is currently on Hospital Day: 5      The patient was admitted to the hospital at N/A on N/A for the following diagnosis:  Abdominal pain [R10 9]  Gastric leak [K91 89]       There is documentation in the medical record of an expected length of stay of at least 2 midnights  The patient is therefore expected to satisfy the 2 midnight benchmark and given the 2 midnight presumption is appropriate for INPATIENT ADMISSION  Given this expectation of a satisfying stay, CMS instructs us that the patient is most often appropriate for inpatient admission under part A provided medical necessity is documented in the chart  After review of the relevant documentation, labs, vital signs and test results, the patient is appropriate for INPATIENT ADMISSION  Admission to the hospital as an inpatient is a complex decision making process which requires the practitioner to consider the patients presenting complaint, history and physical examination and all relevant testing  With this in mind, in this case, the patient was deemed appropriate for INPATIENT ADMISSION  After review of the documentation and testing available at the time of the admission I concur with this clinical determination of medical necessity  Rationale is as follows: The patient is a 36 yrs old Female who presented to the ED at 4/25/2018  1:36 AM with a chief complaint of Abdominal Pain (severe abdominal pain for 2 days, has a perforated stomach, pt is getting tube feeds and abdomen is "swelling", no fever or chills, no N/V)     The patient continues to remain hospitalized, with increasing abdominal pain, status post gastric leak at the GE junction  Patient did attempted hernia repair previously, and is now noted to have abdominal distention, increasing drain output, and increasing abdominal pain    Given that the patient has been hospitalized for 2 midnights under observation status, continues to worsen, and is noted to have increased pain, the patient is appropriate for inpatient admission  Patient has failed a period of observation, and continues to remain hospitalized for monitoring and management by General surgery, thoracic surgery, and Internal Medicine  Given all this, the patient is appropriate for inpatient admission  The patients vitals on arrival were ED Triage Vitals [04/25/18 0131]   Temperature Pulse Respirations Blood Pressure SpO2   98 3 °F (36 8 °C) 88 18 108/62 97 %      Temp Source Heart Rate Source Patient Position - Orthostatic VS BP Location FiO2 (%)   Oral Monitor Sitting Left arm --      Pain Score       8           Past Medical History:   Diagnosis Date    Brain condition     Pseudotumor Cerebri     Migraine     Obesity     Papilledema, both eyes     Presence of lumboperitoneal shunt     Resolved: Sep 20, 2017    Rotator cuff tendinitis     Resolved: Aug 23, 2017    Visual field defect      Past Surgical History:   Procedure Laterality Date    CSF SHUNT      LP shunt - 2015 -  shunt - 2017    ESOPHAGOGASTRODUODENOSCOPY N/A 2/23/2018    Procedure: ESOPHAGOGASTRODUODENOSCOPY (EGD) WITH ESOPHAGEAL STENT PLACEMENT;  Surgeon: Farrah Baugh MD;  [facility];  Service: Thoracic    ESOPHAGOGASTRODUODENOSCOPY N/A 2/23/2018    Procedure: ESOPHAGOGASTRODUODENOSCOPY (EGD) WITH REMOVAL ESOPHAGEAL STENT  AND REPLACEMENT WITH 23mm X155mm [address];  Surgeon: Farrah Baugh MD;  [facility];  Service: Thoracic    ESOPHAGOGASTRODUODENOSCOPY N/A 3/28/2018    Procedure: ESOPHAGOGASTRODUODENOSCOPY (EGD) with PEJ placement ;  Surgeon: Rodrigo Becker MD;  [facility]; Service: Gastroenterology    ESOPHAGOGASTRODUODENOSCOPY N/A 4/5/2018    Procedure: ESOPHAGOGASTRODUODENOSCOPY (EGD) with botox injection and kaofed placement;  Surgeon: Cande Rodriguez DO;  [facility];   Service: Gastroenterology    ESOPHAGOGASTRODUODENOSCOPY N/A 4/10/2018    Procedure: ESOPHAGOGASTRODUODENOSCOPY (EGD) with Kaofed placement;  Surgeon: Gómez Gonzalez MD;  [facility]; Service: Gastroenterology    ESOPHAGOSCOPY WITH STENT INSERTION N/A 1/24/2018    Procedure: INSERTION STENT ESOPHAGEAL;  Surgeon: Sybil Lambert MD;  [facility]; Service: Gastroenterology    EYE SURGERY      for "crossed eyed" (in 2nd grade)     GASTRIC BYPASS  2016    HERNIA REPAIR      HYSTERECTOMY      LAPAROTOMY N/A 1/25/2018    Procedure: Exploratory Laparotomy, wash out,placement of drains, placement of NG feeding tube ;   Surgeon: Genie Hayden DO;  [facility];  Service: General    MS CREATE SHUNT:VENTRIC-PERITONEAL Right 5/31/2017    Procedure: IMAGE GUIDED CORONAL PLACEMENT OF PROGRAMABLE VENTRICULAR-PERITONEAL SHUNT, REMOVAL OF LP SHUNT ;  Surgeon: Cinthia Bence, MD;  [facility];  Service: Neurosurgery    MS Lacho Chelsie CSF SHUNT,W/O REPLACE Right 2/5/2018    Procedure: Removal of  shunt;  Surgeon: Cinthia Bence, MD;  [facility];  Service: Neurosurgery    MS REPLACEMENT/REVISION,CSF SHUNT Right 1/25/2018    Procedure: Externalization of right-sided SHUNT VENTRICULAR-PERITONEAL in anterior chest wall ribs two and three level  ;  Surgeon: Williams Michelle MD;  [facility];  Service: Neurosurgery           Consults have been placed to:   IP CONSULT TO CASE MANAGEMENT  IP CONSULT TO THORACIC SURGERY  IP CONSULT TO NUTRITION SERVICES  IP CONSULT TO WOUND CARE    Vitals:    04/27/18 2320 04/28/18 0733 04/28/18 1526 04/28/18 2318   BP: 98/54 101/55 104/56 94/54   BP Location: Left arm Right arm Left arm Left arm   Pulse: 83 91 77 75   Resp: 18 20 18 18   Temp: 97 9 °F (36 6 °C) 98 5 °F (36 9 °C) 98 4 °F (36 9 °C) 98 3 °F (36 8 °C)   TempSrc: Oral Oral Oral Oral   SpO2: 99% 96% 94% 96%   Weight:       Height:           Most recent labs:    Recent Labs      04/28/18   1020   WBC  6 75   HGB  9 7*   HCT  30 4* PLT  212   K  4 2   NA  136   CALCIUM  8 9   BUN  16   CREATININE  0 45*       Scheduled Meds:  Current Facility-Administered Medications:  acetaminophen 650 mg Oral Q6H PRN Alo Garcia PA-C   heparin (porcine) 5,000 Units Subcutaneous Q8H [address] Leidy Wiggins   ondansetron 4 mg Intravenous Q6H PRN Ebony Cash   oxyCODONE 10 mg Oral Q4H PRN Radha Forrest PA-C   oxyCODONE 5 mg Oral Q4H PRN Radha Forrest PA-C   senna 17 6 mg Per NG Tube HS Danny Bravo MD     Continuous Infusions:   PRN Meds:   acetaminophen    ondansetron    oxyCODONE    oxyCODONE    Surgical procedures (if appropriate):

## 2018-04-29 NOTE — PROGRESS NOTES
Progress Note - General Surgery  Isatu Jackson 36 y o  female MRN: [id number]  Unit/Bed#: [facility] 611-01 Encounter: [id number]    Assessment:  36y o -year-old female s/p gastric sleeve with injury near the angle of Hiss during attempted hiatal hernia repair at outside hospital   Admitted with abdominal pain and increased drain output, with a drain position near the known gastric sleeve injury  Patient also has post pyloric keofed  Plan:  - NPO  - continue IR drain to suction  - continue wound manager to midline  - TF at 16 hour cycled  - SQH/SCDs  - dispo planning      Subjective:  Very anxious about new lumps, but feels reassured that all labs normal  Tolerating tube feeds    Objective:  Patient Vitals for the past 24 hrs:   BP Temp Temp src Pulse Resp SpO2   04/28/18 2318 94/54 98 3 °F (36 8 °C) Oral 75 18 96 %   04/28/18 1526 104/56 98 4 °F (36 9 °C) Oral 77 18 94 %   04/28/18 0733 101/55 98 5 °F (36 9 °C) Oral 91 20 96 %          Diet Orders            Start     Ordered    04/27/18 0659  Diet Enteral/Parenteral; Tube Feeding No Oral Diet; Jevity 1 2 Claude; 3778-7088 (3pm-7am) (16 hour cycle); 105; 100; Water; Every 4 hours  Diet effective now     Question Answer Comment   Diet Type Enteral/Parenteral    Enteral/Parenteral Tube Feeding No Oral Diet    Tube Feeding Formula: Jevity 1 2 Claude    Tube Feeding Cyclic (start / stop time): 3425-2781 (3pm-7am) (16 hour cycle)    Tube Feeding Cyclic Rate (mL/hr): 301    Tube Feeding water flush (mL): 100    Water Flush type: Water    Water flush frequency: Every 4 hours    RD to adjust diet per protocol?  Yes        04/27/18 0659          Intake/Output Summary (Last 24 hours) at 04/29/18 0701  Last data filed at 04/29/18 1561   Gross per 24 hour   Intake             1495 ml   Output             1920 ml   Net             -425 ml     Physical Exam:  NAD   Norm resp effort  RRR  Abd soft, tender in LLQ, ND, midline IR suction bulb with seropurulence, midline wound manager with same and some air      Medications:    Current Facility-Administered Medications:  acetaminophen 650 mg Oral Q6H PRN Mary Owen PA-C   heparin (porcine) 5,000 Units Subcutaneous Q8H [facility] Leidy Wiggins   ondansetron 4 mg Intravenous Q6H PRN Melissa Mayorga   oxyCODONE 10 mg Oral Q4H PRN Ricky Vital PA-C   oxyCODONE 5 mg Oral Q4H PRN Ricky Vital PA-C   senna 17 6 mg Per NG Tube HS Jeraldene Soulier, MD        acetaminophen 650 mg Q6H PRN   ondansetron 4 mg Q6H PRN   oxyCODONE 10 mg Q4H PRN   oxyCODONE 5 mg Q4H PRN     Laboratory results:   CBC:   Lab Results   Component Value Date    WBC 6 75 04/28/2018    HGB 9 7 (L) 04/28/2018    HCT 30 4 (L) 04/28/2018    MCV 82 04/28/2018     04/28/2018    MCH 26 2 (L) 04/28/2018    MCHC 31 9 04/28/2018    RDW 15 8 (H) 04/28/2018    MPV 9 3 04/28/2018    NRBC 0 04/28/2018   , CMP:   Lab Results   Component Value Date     04/28/2018    K 4 2 04/28/2018    CL 99 (L) 04/28/2018    CO2 32 04/28/2018    ANIONGAP 5 04/28/2018    BUN 16 04/28/2018    CREATININE 0 45 (L) 04/28/2018    GLUCOSE 114 04/28/2018    CALCIUM 8 9 04/28/2018    EGFR 123 04/28/2018   , Coagulation: No results found for: PT, INR, APTT, Urinalysis: No results found for: COLORU, CLARITYU, SPECGRAV, PHUR, LEUKOCYTESUR, NITRITE, PROTEINUA, GLUCOSEU, KETONESU, BILIRUBINUR, BLOODU, Amylase: No results found for: AMYLASE, Lipase: No results found for: LIPASE    VTE Pharmacologic Prophylaxis: Heparin  VTE Mechanical Prophylaxis: sequential compression device

## 2018-04-29 NOTE — PROGRESS NOTES
Progress Note - General Surgery  Zainab Russell 36 y o  female MRN: [id number]  Unit/Bed#: [facility] 611-01 Encounter: [id number]    Assessment:  36y o -year-old female s/p gastric sleeve with injury near the angle of Hiss during attempted hiatal hernia repair at outside hospital   Admitted with abdominal pain and increased drain output, with a drain position near the known gastric sleeve injury  Patient also has post pyloric keofed  Plan:  - NPO  - continue IR drain to suction  - continue wound manager to midline  - TF at 16 hour cycled  - SQH/SCDs  - dispo planning per red surgery      Subjective:  Very anxious about new lumps, but feels reassured that all labs normal  Tolerating tube feeds    Objective:  Patient Vitals for the past 24 hrs:   BP Temp Temp src Pulse Resp SpO2   04/28/18 2318 94/54 98 3 °F (36 8 °C) Oral 75 18 96 %   04/28/18 1526 104/56 98 4 °F (36 9 °C) Oral 77 18 94 %   04/28/18 0733 101/55 98 5 °F (36 9 °C) Oral 91 20 96 %          Diet Orders            Start     Ordered    04/27/18 0659  Diet Enteral/Parenteral; Tube Feeding No Oral Diet; Jevity 1 2 Claude; 5427-4388 (3pm-7am) (16 hour cycle); 105; 100; Water; Every 4 hours  Diet effective now     Question Answer Comment   Diet Type Enteral/Parenteral    Enteral/Parenteral Tube Feeding No Oral Diet    Tube Feeding Formula: Jevity 1 2 Claude    Tube Feeding Cyclic (start / stop time): 1388-4496 (3pm-7am) (16 hour cycle)    Tube Feeding Cyclic Rate (mL/hr): 820    Tube Feeding water flush (mL): 100    Water Flush type: Water    Water flush frequency: Every 4 hours    RD to adjust diet per protocol?  Yes        04/27/18 0659          Intake/Output Summary (Last 24 hours) at 04/29/18 0705  Last data filed at 04/29/18 1263   Gross per 24 hour   Intake             1495 ml   Output             1920 ml   Net             -425 ml     Physical Exam:  NAD   Norm resp effort  RRR  Abd soft, tender in LLQ, ND, midline IR suction bulb with seropurulence, midline wound manager with same and some air      Medications:    Current Facility-Administered Medications:  acetaminophen 650 mg Oral Q6H PRN Kinsey Salinas PA-C   heparin (porcine) 5,000 Units Subcutaneous Q8H [address] Leidy Wiggins   ondansetron 4 mg Intravenous Q6H PRN Rinda Fass   oxyCODONE 10 mg Oral Q4H PRN Leocadia Ness, PA-C   oxyCODONE 5 mg Oral Q4H PRN Leocadia Ness, PA-ANNE   senna 17 6 mg Per NG Tube HS Jefferson Ganser, MD        acetaminophen 650 mg Q6H PRN   ondansetron 4 mg Q6H PRN   oxyCODONE 10 mg Q4H PRN   oxyCODONE 5 mg Q4H PRN     Laboratory results:   CBC:   Lab Results   Component Value Date    WBC 6 75 04/28/2018    HGB 9 7 (L) 04/28/2018    HCT 30 4 (L) 04/28/2018    MCV 82 04/28/2018     04/28/2018    MCH 26 2 (L) 04/28/2018    MCHC 31 9 04/28/2018    RDW 15 8 (H) 04/28/2018    MPV 9 3 04/28/2018    NRBC 0 04/28/2018   , CMP:   Lab Results   Component Value Date     04/28/2018    K 4 2 04/28/2018    CL 99 (L) 04/28/2018    CO2 32 04/28/2018    ANIONGAP 5 04/28/2018    BUN 16 04/28/2018    CREATININE 0 45 (L) 04/28/2018    GLUCOSE 114 04/28/2018    CALCIUM 8 9 04/28/2018    EGFR 123 04/28/2018   , Coagulation: No results found for: PT, INR, APTT, Urinalysis: No results found for: COLORU, CLARITYU, SPECGRAV, PHUR, LEUKOCYTESUR, NITRITE, PROTEINUA, GLUCOSEU, KETONESU, BILIRUBINUR, BLOODU, Amylase: No results found for: AMYLASE, Lipase: No results found for: LIPASE    VTE Pharmacologic Prophylaxis: Heparin  VTE Mechanical Prophylaxis: sequential compression device
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
## 2018-04-30 VITALS
RESPIRATION RATE: 16 BRPM | SYSTOLIC BLOOD PRESSURE: 95 MMHG | TEMPERATURE: 98.1 F | BODY MASS INDEX: 20.52 KG/M2 | DIASTOLIC BLOOD PRESSURE: 54 MMHG | WEIGHT: 120.2 LBS | HEART RATE: 77 BPM | HEIGHT: 64 IN | OXYGEN SATURATION: 98 %

## 2018-04-30 LAB
ANION GAP SERPL CALCULATED.3IONS-SCNC: 5 MMOL/L (ref 4–13)
BASOPHILS # BLD AUTO: 0.04 THOUSANDS/ΜL (ref 0–0.1)
BASOPHILS NFR BLD AUTO: 1 % (ref 0–1)
BUN SERPL-MCNC: 15 MG/DL (ref 5–25)
CALCIUM SERPL-MCNC: 8.7 MG/DL (ref 8.3–10.1)
CHLORIDE SERPL-SCNC: 98 MMOL/L (ref 100–108)
CO2 SERPL-SCNC: 34 MMOL/L (ref 21–32)
CREAT SERPL-MCNC: 0.38 MG/DL (ref 0.6–1.3)
EOSINOPHIL # BLD AUTO: 0.37 THOUSAND/ΜL (ref 0–0.61)
EOSINOPHIL NFR BLD AUTO: 6 % (ref 0–6)
ERYTHROCYTE [DISTWIDTH] IN BLOOD BY AUTOMATED COUNT: 16.1 % (ref 11.6–15.1)
GFR SERPL CREATININE-BSD FRML MDRD: 130 ML/MIN/1.73SQ M
GLUCOSE SERPL-MCNC: 140 MG/DL (ref 65–140)
HCT VFR BLD AUTO: 31.1 % (ref 34.8–46.1)
HGB BLD-MCNC: 9.6 G/DL (ref 11.5–15.4)
LYMPHOCYTES # BLD AUTO: 1.43 THOUSANDS/ΜL (ref 0.6–4.47)
LYMPHOCYTES NFR BLD AUTO: 23 % (ref 14–44)
MAGNESIUM SERPL-MCNC: 1.9 MG/DL (ref 1.6–2.6)
MCH RBC QN AUTO: 26.2 PG (ref 26.8–34.3)
MCHC RBC AUTO-ENTMCNC: 30.9 G/DL (ref 31.4–37.4)
MCV RBC AUTO: 85 FL (ref 82–98)
MONOCYTES # BLD AUTO: 0.52 THOUSAND/ΜL (ref 0.17–1.22)
MONOCYTES NFR BLD AUTO: 9 % (ref 4–12)
NEUTROPHILS # BLD AUTO: 3.75 THOUSANDS/ΜL (ref 1.85–7.62)
NEUTS SEG NFR BLD AUTO: 61 % (ref 43–75)
NRBC BLD AUTO-RTO: 0 /100 WBCS
PLATELET # BLD AUTO: 205 THOUSANDS/UL (ref 149–390)
PMV BLD AUTO: 9.7 FL (ref 8.9–12.7)
POTASSIUM SERPL-SCNC: 4.5 MMOL/L (ref 3.5–5.3)
PREALB SERPL-MCNC: 19.2 MG/DL (ref 18–40)
RBC # BLD AUTO: 3.66 MILLION/UL (ref 3.81–5.12)
SODIUM SERPL-SCNC: 137 MMOL/L (ref 136–145)
WBC # BLD AUTO: 6.12 THOUSAND/UL (ref 4.31–10.16)

## 2018-04-30 PROCEDURE — 80048 BASIC METABOLIC PNL TOTAL CA: CPT | Performed by: SURGERY

## 2018-04-30 PROCEDURE — 83735 ASSAY OF MAGNESIUM: CPT | Performed by: SURGERY

## 2018-04-30 PROCEDURE — 84134 ASSAY OF PREALBUMIN: CPT | Performed by: SURGERY

## 2018-04-30 PROCEDURE — 85025 COMPLETE CBC W/AUTO DIFF WBC: CPT | Performed by: SURGERY

## 2018-04-30 PROCEDURE — 99024 POSTOP FOLLOW-UP VISIT: CPT | Performed by: PHYSICIAN ASSISTANT

## 2018-04-30 RX ORDER — POLYETHYLENE GLYCOL 3350 17 G/17G
17 POWDER, FOR SOLUTION ORAL DAILY
Qty: 14 EACH | Refills: 0
Start: 2018-04-30 | End: 2018-08-14 | Stop reason: ALTCHOICE

## 2018-04-30 RX ORDER — ACETAMINOPHEN 160 MG/5ML
650 SUSPENSION, ORAL (FINAL DOSE FORM) ORAL EVERY 6 HOURS PRN
Qty: 118 ML | Refills: 0
Start: 2018-04-30 | End: 2018-08-14 | Stop reason: ALTCHOICE

## 2018-04-30 RX ORDER — OXYCODONE HCL 5 MG/5 ML
5-10 SOLUTION, ORAL ORAL EVERY 4 HOURS PRN
Qty: 473 ML | Refills: 0 | Status: SHIPPED | OUTPATIENT
Start: 2018-04-30 | End: 2018-08-03

## 2018-04-30 RX ADMIN — HEPARIN SODIUM 5000 UNITS: 5000 INJECTION, SOLUTION INTRAVENOUS; SUBCUTANEOUS at 05:47

## 2018-04-30 RX ADMIN — OXYCODONE HYDROCHLORIDE 10 MG: 5 SOLUTION ORAL at 13:42

## 2018-04-30 RX ADMIN — OXYCODONE HYDROCHLORIDE 10 MG: 5 SOLUTION ORAL at 02:43

## 2018-04-30 RX ADMIN — POLYETHYLENE GLYCOL 3350 17 G: 17 POWDER, FOR SOLUTION ORAL at 09:33

## 2018-04-30 RX ADMIN — OXYCODONE HYDROCHLORIDE 10 MG: 5 SOLUTION ORAL at 09:33

## 2018-04-30 RX ADMIN — HEPARIN SODIUM 5000 UNITS: 5000 INJECTION, SOLUTION INTRAVENOUS; SUBCUTANEOUS at 13:41

## 2018-04-30 NOTE — PROGRESS NOTES
Progress Note - Thoracic Surgery   Roger Zuniga 36 y o  female MRN: [id number]  Unit/Bed#: [facility] 611-01 Encounter: [id number]    Assessment:  36y o -year-old female s/p gastric sleeve with injury near the angle of Hiss during attempted hiatal hernia repair at outside hospital   Admitted with abdominal pain and increased drain output, with a drain position near the known gastric sleeve injury  Patient also has post pyloric keofed  Plan:  - NPO, TF as tolerated  - midline wound manager  - ADENIKE to sxn  - ab US -- nodules c/w heparin shots  - dispo planning    Subjective/Objective     Subjective: Patient reports abdominal pain  Tolerating TF      Objective:     Vitals: Blood pressure 100/57, pulse 81, temperature 98 5 °F (36 9 °C), temperature source Oral, resp  rate 20, height 5' 4" (1 626 m), weight 54 5 kg (120 lb 3 2 oz), SpO2 96 %, not currently breastfeeding  ,Body mass index is 20 63 kg/m²  I/O       04/28 0701 - 04/29 0700 04/29 0701 - 04/30 0700    P  O  0 0    NG/GT 2335 150    Total Intake(mL/kg) 2335 (42 8) 150 (2 8)    Urine (mL/kg/hr) 1600 (1 2) 1475 (1 1)    Drains 320 (0 2) 240 (0 2)    Total Output 1920 1715    Net +[phone] -[phone]                Physical Exam:  GEN: NAD  HEENT: MMM  CV: RRR  Lung: Normal effort  Ab: Soft, NT/ND  Extrem: No CCE  Neuro:  A+Ox3    Lab, Imaging and other studies: CBC with diff: No results found for: WBC, HGB, HCT, MCV, PLT, ADJUSTEDWBC, MCH, MCHC, RDW, MPV, NRBC, BMP/CMP: No results found for: NA, K, CL, CO2, ANIONGAP, BUN, CREATININE, GLUCOSE, CALCIUM, AST, ALT, ALKPHOS, PROT, ALBUMIN, BILITOT, EGFR, Magnesium: No results found for: MAG  VTE Pharmacologic Prophylaxis: Heparin  VTE Mechanical Prophylaxis: sequential compression device

## 2018-04-30 NOTE — SOCIAL WORK
SEAMUS HUNTER can't see pt today & are asking it pt is independent w TF and pump  CM s/w pt whom stated she is independent with both   Ecin message sent to SEAMUS Hunter

## 2018-04-30 NOTE — SOCIAL WORK
Pt for discharge today  Ecin messages to SEAMUS HUNTER & [employer] to notify of discharge  Pt already has TF supplies  No further needs per red sx

## 2018-05-01 ENCOUNTER — TRANSITIONAL CARE MANAGEMENT (OUTPATIENT)
Dept: INTERNAL MEDICINE CLINIC | Facility: CLINIC | Age: 43
End: 2018-05-01

## 2018-05-01 NOTE — ASSESSMENT & PLAN NOTE
- Persistent/chronic gastric perforation following sleeve gastrectomy status post multiple prior operative interventions an endoscopic interventions including stent placement  - Continue to remain NPO indefinitely with nutrition via a nasoenteric feeding tube as follows:  Jevity 1 2 at 105 mL/hour from 3:00 p m  to 7:00 a m  daily with 100 mL water flushes every 4 hours  - Continue local wound care and fistula management as noted below  - Continue ADENIKE drain to bulb suction   - Continued outpatient follow-up with the thoracic surgery and possible bariatric/minimally invasive surgical services

## 2018-05-01 NOTE — DISCHARGE SUMMARY
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  - Continue local wound care  - Continue oral analgesic regimen   - Continue nutritional support as above  - Continue outpatient follow-up with the thoracic surgery service  * Gastric perforation ([address] )   Assessment & Plan    - Persistent/chronic gastric perforation following sleeve gastrectomy status post multiple prior operative interventions an endoscopic interventions including stent placement  - Continue to remain NPO indefinitely with nutrition via a nasoenteric feeding tube as follows:  Jevity 1 2 at 105 mL/hour from 3:00 p m  to 7:00 a m  daily with 100 mL water flushes every 4 hours  - Continue local wound care and fistula management as noted below  - Continue ADENIKE drain to bulb suction   - Continued outpatient follow-up with the thoracic surgery and possible bariatric/minimally invasive surgical services  Discharge Summary - General Surgery   Abelino Reed 36 y o  female MRN: [id number]  Unit/Bed#: [facility] 520-47 Encounter: [id number]     Admission Date: 4/25/2018      Discharge Date: 4/30/2018     Admitting Diagnosis: Abdominal pain [R10 9]  Gastric leak [K91 89]     Discharge Diagnosis: See above      Attending and Service: Dr Gabe Rojas Physician(s): Dr Goldy Perea, Thoracic Surgery      Imaging and Procedures Performed:      Ir Tube Check     Result Date: 4/26/2018  Impression: Impression: The drain appears to be in position and does communicate with the subcutaneous gas pocket as evidenced by contrast extending to the skin surface and into the ostomy bag on lateral view   PLAN: Patient will be taken back to her room  She will be evaluated by the surgical service  Workstation performed: [id number]      Ct Abdomen Pelvis W Contrast     Result Date: 4/25/2018  Impression: 1  Perigastric gas-containing collection arising from the distal esophagus unchanged from prior study  Persistent fistulous tract from the stomach to the anterior abdominal wall with significantly increased amount of air extending to the anterior abdominal wall  2   Decreased left subdiaphragmatic gas collection  3   Multiple bilateral nonobstructing renal calculi Workstation performed: [id number]      Bedside midline abdominal wall incision and drainage on 04/25/2018      Pathology: N/A     Hospital Course: Sveta Sarmiento is a 39-year-old female presented with abdominal pain  She is well known to the surgical service following a gastric sleeve procedure earlier this year at an outside hospital with subsequent perforation of the gastric sleeve and 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  The fistula had resolved with persistent gastric leak being treated with percutaneous drainage  The patient had completed a state rehab and was receiving nutrition via TPN and nasoenteric tube feeds during her prior inpatient and rehab stays  Currently, she has been at home for approximately 1 week on continued nasoenteric tube feeds  She had developed new abdominal pain and focal bulging from her midline abdominal incision  Common siding with these new symptoms, the output from her drain has increased  On her initial evaluation, she was afebrile with normal vital signs; in his own therapy feeding tube remained in place via 1 of her nares; her abdomen was soft, minimally tender over a focal area of distension and skin tautness without erythema, the abdominal in ADENIKE drain had murky serous fluid in the bulb; the remainder of her exam was unremarkable    Her initial workup included the above-noted imaging studies      She was admitted to the acute care surgery service with persistent gastric leak following sleeve gastrectomy with intra-abdominal abscess and recurrent enterocutaneous fistula  She was kept NPO and continued on her baseline nasoenteric tube feeding  Messi Quinn is provided some resuscitative IV fluids and an analgesic regimen  She underwent bedside incision and drainage of the abdominal wall wound to allow adequate drainage of her recurrent enterocutaneous fistula  She had local wound care or the enterocutaneous fistula with a drainage being managed with an ostomy appliance  Following drainage, her abdominal pain and leukocytosis improved  Interventional Radiology interrogated her drain which appeared to be in adequate position and demonstrated a persistent fistula  The thoracic surgery service evaluated the patient during her stay as well as they have been following her chronically as an outpatient  They reiterated a continued long-term approach to potentially allow her fistula to heal   They recommended continuing the NPO status and nasoenteric tube feeds with a plan for possible operative intervention in a couple months of her fistula does not resolve  By 4/30/2018, she was deemed stable for discharge with VNA for continued wound care and continued nasoenteric tube feeds      On discharge, the patient is instructed to follow-up with the patient's primary care provider in the next one month to review the events of the recent hospitalization  The patient is instructed to follow-up with the [facility] as needed  She is instructed to follow up with thoracic surgery as previously scheduled    She is follow the remaining provided discharge instructions      Condition at Discharge: good      Discharge instructions/Information to patient and family:   See after visit summary for information provided to patient and family        Provisions for Follow-Up Care:  See after visit summary for information related to follow-up care and any pertinent home health orders        Disposition: See After Visit Summary for discharge disposition information      Planned Readmission: No     Discharge Statement   I spent 30 minutes discharging the patient  This time was spent on the day of discharge  I had direct contact with the patient on the day of discharge  Additional documentation is required if more than 30 minutes were spent on discharge       Discharge Medications:  See after visit summary for reconciled discharge medications provided to patient and family        Kathy Gallardo  4/26/2018  5:41 PMDischarge Summary - General Surgery   Zainab Russell 36 y o  female MRN: [id number]  Unit/Bed#: [facility] 232-39 Encounter: [id number]     Admission Date: 4/25/2018      Discharge Date: 4/30/2018     Admitting Diagnosis: Abdominal pain [R10 9]  Gastric leak [K91 89]     Discharge Diagnosis: See above      Attending and Service: Dr Daniella Greenberg Physician(s): Dr Estela Zamora, Thoracic Surgery      Imaging and Procedures Performed:      Ir Tube Check     Result Date: 4/26/2018  Impression: Impression: The drain appears to be in position and does communicate with the subcutaneous gas pocket as evidenced by contrast extending to the skin surface and into the ostomy bag on lateral view  PLAN: Patient will be taken back to her room  She will be evaluated by the surgical service  Workstation performed: [id number]      Ct Abdomen Pelvis W Contrast     Result Date: 4/25/2018  Impression: 1  Perigastric gas-containing collection arising from the distal esophagus unchanged from prior study  Persistent fistulous tract from the stomach to the anterior abdominal wall with significantly increased amount of air extending to the anterior abdominal wall  2   Decreased left subdiaphragmatic gas collection   3   Multiple bilateral nonobstructing renal calculi Workstation performed: [id number]      Bedside midline abdominal wall incision and drainage on 04/25/2018      Pathology: N/A     Hospital Course: Glenys Randall is a 27-year-old female presented with abdominal pain  She is well known to the surgical service following a gastric sleeve procedure earlier this year at an outside hospital with subsequent perforation of the gastric sleeve and 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  The fistula had resolved with persistent gastric leak being treated with percutaneous drainage  The patient had completed a state rehab and was receiving nutrition via TPN and nasoenteric tube feeds during her prior inpatient and rehab stays  Currently, she has been at home for approximately 1 week on continued nasoenteric tube feeds  She had developed new abdominal pain and focal bulging from her midline abdominal incision  Common siding with these new symptoms, the output from her drain has increased  On her initial evaluation, she was afebrile with normal vital signs; in his own therapy feeding tube remained in place via 1 of her nares; her abdomen was soft, minimally tender over a focal area of distension and skin tautness without erythema, the abdominal in ADENIKE drain had murky serous fluid in the bulb; the remainder of her exam was unremarkable  Her initial workup included the above-noted imaging studies      She was admitted to the acute care surgery service with persistent gastric leak following sleeve gastrectomy with intra-abdominal abscess and recurrent enterocutaneous fistula  She was kept NPO and continued on her baseline nasoenteric tube feeding  Archana Marleny is provided some resuscitative IV fluids and an analgesic regimen  She underwent bedside incision and drainage of the abdominal wall wound to allow adequate drainage of her recurrent enterocutaneous fistula  She had local wound care or the enterocutaneous fistula with a drainage being managed with an ostomy appliance    Following drainage, her abdominal pain and leukocytosis improved  Interventional Radiology interrogated her drain which appeared to be in adequate position and demonstrated a persistent fistula  The thoracic surgery service evaluated the patient during her stay as well as they have been following her chronically as an outpatient  They reiterated a continued long-term approach to potentially allow her fistula to heal   They recommended continuing the NPO status and nasoenteric tube feeds with a plan for possible operative intervention in a couple months of her fistula does not resolve  By 4/30/2018, she was deemed stable for discharge with VNA for continued wound care and continued nasoenteric tube feeds      On discharge, the patient is instructed to follow-up with the patient's primary care provider in the next one month to review the events of the recent hospitalization  The patient is instructed to follow-up with the [facility] as needed  She is instructed to follow up with thoracic surgery as previously scheduled  She is follow the remaining provided discharge instructions      Condition at Discharge: good      Discharge instructions/Information to patient and family:   See after visit summary for information provided to patient and family        Provisions for Follow-Up Care:  See after visit summary for information related to follow-up care and any pertinent home health orders        Disposition: See After Visit Summary for discharge disposition information      Planned Readmission: No     Discharge Statement   I spent 30 minutes discharging the patient  This time was spent on the day of discharge  I had direct contact with the patient on the day of discharge   Additional documentation is required if more than 30 minutes were spent on discharge       Discharge Medications:  See after visit summary for reconciled discharge medications provided to patient and family     Leslie Schultz  4/30/2018 10:00 AM

## 2018-05-02 NOTE — CASE MANAGEMENT
PLEASE UPDATE THE DOS ON THE PORTAL   IT REFLECTS:  [id number]*QX Leatha Magallon [id number] 4/30/2018 4/30/2018 Approved     Notification of Discharge  This is a Notification of Discharge from our facility [facility]  Please be advised that this patient has been discharge from our facility  Below you will find the admission and discharge date and time including the patients disposition  PRESENTATION DATE: 4/25/2018  1:36 AM  IP ADMISSION DATE: 4/29/18 0150  DISCHARGE DATE: 4/30/2018  6:05 PM  DISPOSITION: Home with [address] [place] [address] in the [facility] by Donald Villa for 2017  Network Utilization Review Department  Phone: [phone]; Fax [phone]  ATTENTION: The Network Utilization Review Department is now centralized for our 7 Facilities  Make a note that we have a new phone and fax numbers for our Department  Please call with any questions or concerns to [phone] and carefully follow the prompts so that you are directed to the right person  All voicemails are confidential  Fax any determinations, approvals, denials, and requests for initial or continue stay review clinical to [phone]  Due to HIGH CALL volume, it would be easier if you could please send faxed requests to expedite your requests and in part, help us provide discharge notifications faster

## 2018-05-08 ENCOUNTER — OFFICE VISIT (OUTPATIENT)
Dept: CARDIAC SURGERY | Facility: CLINIC | Age: 43
End: 2018-05-08
Payer: COMMERCIAL

## 2018-05-08 VITALS
WEIGHT: 121.8 LBS | DIASTOLIC BLOOD PRESSURE: 60 MMHG | HEART RATE: 81 BPM | SYSTOLIC BLOOD PRESSURE: 113 MMHG | TEMPERATURE: 98.8 F | BODY MASS INDEX: 20.79 KG/M2 | HEIGHT: 64 IN | OXYGEN SATURATION: 98 %

## 2018-05-08 DIAGNOSIS — K25.5 GASTRIC PERFORATION (HCC): Primary | ICD-10-CM

## 2018-05-08 PROCEDURE — 99213 OFFICE O/P EST LOW 20 MIN: CPT | Performed by: THORACIC SURGERY (CARDIOTHORACIC VASCULAR SURGERY)

## 2018-05-08 PROCEDURE — 1111F DSCHRG MED/CURRENT MED MERGE: CPT | Performed by: THORACIC SURGERY (CARDIOTHORACIC VASCULAR SURGERY)

## 2018-05-08 NOTE — PROGRESS NOTES
Thoracic Follow-Up  Assessment/Plan:    Gastric perforation ([address] )  Ms Eden Turpin seems to be slowly improving with time and tube feeds  Her gastric cutaneous fistula output is decreasing and she has no signs of undrained collections  We will continue her tube feeds at the current rate and I will see her back in 2 weeks  If the output has continued to remain low we will repeat her barium swallow  We did present her case at the foregut conference and the general consensus was that we would be unlikely to perform another laparotomy to try and repair this for fear of injury to more bowel  It is also felt unlikely that we could bring the Prerna limb up given her previous inflammatory condition  The group thought if she continued to leak that her best option would be to try and suture this defect intraluminally  I will see her back in 2 weeks and both she and her fiance know they can call with any questions or concerns  Diagnoses and all orders for this visit:    Gastric perforation [facility])          Thoracic History    Diagnosis:  Proximal gastric perforation during anti reflux surgery  Procedures: 1  History of sleeve gastrectomy  2   Hiatal hernia repair  3   Attempted laparoscopic repair of proximal gastric perforation  4  Exploratory laparotomy with washout 5  Esophageal stent placement  6   Esophageal stent removal  7   Multiple endoscopies including Botox of the pylorus         Subjective:    Patient ID: Amara Brizuela is a 36 y o  female  Ms Eden Turpin is well known to me from hospital admissions  She has a history of a sleeve gastrectomy more than a year and half ago  She then underwent an attempted hiatal hernia repair due to symptomatic reflux and this resulted in a proximal gastric perforation  She had attempted laparoscopic repair of this but ultimately became unstable and was transferred to [facility]    Her initial care was with our acute care surgeons who did a laparotomy and washed out her abdomen  She was unable to be able to have a jejunostomy tube due to edema and mesenteric foreshortening  She was managed with a esophageal stent and tube drainage  Ultimately the esophageal stent was never affective and has been removed  She has an approximate 8-10 mm proximal gastric perforation that his fistula lies through her midline creating a gastric cutaneous fistula that is controlled with a wound drainage bag  We were able to stop her TPN and convert her to a naso jejunal tube for enteral tube feeds  She has undergone 1 Botox injection of her pylorus to try and decrease the pressure within her sleeve  Additionally her CT scan from her recent hospitalization demonstrates that she has no further large cavity around her perforation but rather a narrow tract leading up to her upper midline wound  Peterson Ashby has been doing fairly well at home  She denies fevers, chills, or night sweats  The output from her midline wound drainage bag has completely ceased over the past 2 days  The output from her IR drain is also coming down in is in the range of 5-15 mL per day down from over 100 per day a couple weeks ago  She is not having increased abdominal pain  She has gained a few lb and is taking her tube feeds 16 hr per day  The following portions of the patient's history were reviewed and updated as appropriate: allergies, current medications, past family history, past medical history, past social history, past surgical history and problem list     Review of Systems   Constitutional: Negative for fever and unexpected weight change  Cardiovascular: Negative for chest pain  Gastrointestinal: Negative for abdominal pain  Objective:   Physical Exam   Constitutional: She appears well-developed  Thin   Eyes: No scleral icterus  Cardiovascular: Normal rate and regular rhythm  Pulmonary/Chest: Effort normal    Abdominal: Soft  She exhibits no distension  There is no tenderness     Wound drainage bag in place with no output  Underlying midline incision is without erythema  Pigtail catheter is in place   Skin: Skin is warm and dry  /60 (BP Location: Right arm, Patient Position: Sitting, Cuff Size: Adult)   Pulse 81   Temp 98 8 °F (37 1 °C)   Ht 5' 4" (1 626 m)   Wt 55 2 kg (121 lb 12 8 oz)   LMP  (LMP Unknown)   SpO2 98%   BMI 20 91 kg/m²        Ct Chest W Contrast    Result Date: 3/8/2018  Narrative CT CHEST WITH IV CONTRAST INDICATION:  Evaluate  new lucency on CXR  COMPARISON: CT chest from 2/22/2018, chest x-ray of 3/8/2018 TECHNIQUE: CT examination of the chest was performed  Axial, sagittal, and coronal 2D reformatted images were created from the source data and submitted for interpretation  Radiation dose length product (DLP) for this visit:  154 08 mGy-cm   This examination, like all CT scans performed in the [facility], was performed utilizing techniques to minimize radiation dose exposure, including the use of iterative  reconstruction and automated exposure control  IV Contrast:  85 mL of iohexol (OMNIPAQUE) FINDINGS: LUNGS:  No cavitation or abscess  Lucency identified on chest x-ray is probably related to remaining aeration of the left lower lobe  There is compressive atelectasis in the left lower lobe more posteriorly adjacent to the moderate-sized pleural effusion  Trachea is patent  Subsegmental atelectasis noted in the right lower lobe  Volume loss in both lower lobes  PLEURA:  Moderate sized left pleural effusion  No right pleural effusion  HEART/GREAT VESSELS:  Heart is normal in size  Thoracic aorta is normal caliber  MEDIASTINUM AND BRET:  Esophageal stent identified extending from the lower esophagus to the visualized stomach  Debris noted within the stent lumen  Right-sided PICC line tip is in the SVC  CHEST WALL AND LOWER NECK: 9 mm hypodense nodule in the left lobe of the thyroid gland    Incidental discovery of one or more thyroid nodule(s) measuring less than 1 5 cm and without suspicious features is noted in this patient who is above 28years old; according to guidelines published in the February 2015 white paper on incidental thyroid nodules in the Journal of the [employer] of Radiology Daphne Sotomayor), no further evaluation is recommended  VISUALIZED STRUCTURES IN THE UPPER ABDOMEN:  Pigtail catheters identified in the left upper quadrant posterior to the spleen where there is trace perisplenic fluid and at the anterior upper abdominal air pocket  OSSEOUS STRUCTURES:  No acute fracture or destructive osseous lesion  Impression 1  No evidence of lung cavitation or abscess  Lucency identified on chest x-ray overlying the left perihilar region is likely related to remaining aeration of the left lower lobe  2   Moderate-sized left pleural effusion, slightly decreased  Volume loss in the bilateral lower lobes with associated atelectatic changes  Workstation performed: [id number]     IR tube check on April 25, 2018    Findings: There is filling of the tube tract and contrast extending to what is presumed to be the week near the esophagus/stomach  In addition, contrast extends towards the skin surface into the subcutaneous tissues as evidenced by a lateral view  This   likely represents the area of subcutaneous gas  Air bubbles and contrast were seen to exit into the ostomy bag      IMPRESSION:  Impression: The drain appears to be in position and does communicate with the subcutaneous gas pocket as evidenced by contrast extending to the skin surface and into the ostomy bag on lateral view

## 2018-05-08 NOTE — ASSESSMENT & PLAN NOTE
Ms Lisa Lafleur seems to be slowly improving with time and tube feeds  Her gastric cutaneous fistula output is decreasing and she has no signs of undrained collections  We will continue her tube feeds at the current rate and I will see her back in 2 weeks  If the output has continued to remain low we will repeat her barium swallow  We did present her case at the foregut conference and the general consensus was that we would be unlikely to perform another laparotomy to try and repair this for fear of injury to more bowel  It is also felt unlikely that we could bring the Prerna limb up given her previous inflammatory condition  The group thought if she continued to leak that her best option would be to try and suture this defect intraluminally  I will see her back in 2 weeks and both she and her fiance know they can call with any questions or concerns

## 2018-05-11 ENCOUNTER — TELEPHONE (OUTPATIENT)
Dept: CARDIAC SURGERY | Facility: CLINIC | Age: 43
End: 2018-05-11

## 2018-05-11 NOTE — TELEPHONE ENCOUNTER
Joanna from [facility] called in regards to a script that was supposed to be sent to [place] [id number] for Jevity 1 2 (105 mL/hour 3pm-7am & 100 mL water flush)  The [facility] stated that the pharmacy has not received anything from our office  Alex Spears has reached out to the pharmacy, and is waiting for a call back from them  The pt only has enough for her tube feedings for about one more day  Alex Spears was notified again during the call from the [facility]  Alex Spears was given the number to reach Joanna at [phone], noting that the [facility] will NOT be available over the weekend

## 2018-05-17 DIAGNOSIS — K22.3 ESOPHAGEAL PERFORATION: Primary | ICD-10-CM

## 2018-05-21 ENCOUNTER — APPOINTMENT (OUTPATIENT)
Dept: RADIOLOGY | Facility: HOSPITAL | Age: 43
End: 2018-05-21
Payer: COMMERCIAL

## 2018-05-21 ENCOUNTER — HOSPITAL ENCOUNTER (OUTPATIENT)
Dept: RADIOLOGY | Facility: HOSPITAL | Age: 43
Discharge: HOME | End: 2018-05-21
Attending: THORACIC SURGERY (CARDIOTHORACIC VASCULAR SURGERY)
Payer: COMMERCIAL

## 2018-05-21 ENCOUNTER — TRANSCRIBE ORDERS (OUTPATIENT)
Dept: ADMINISTRATIVE | Facility: HOSPITAL | Age: 43
End: 2018-05-21

## 2018-05-21 DIAGNOSIS — K22.3 ESOPHAGEAL PERFORATION: ICD-10-CM

## 2018-05-21 PROCEDURE — 74220 X-RAY XM ESOPHAGUS 1CNTRST: CPT

## 2018-05-21 RX ADMIN — IOHEXOL 100 ML: 350 INJECTION, SOLUTION INTRAVENOUS at 11:22

## 2018-05-22 ENCOUNTER — OFFICE VISIT (OUTPATIENT)
Dept: CARDIAC SURGERY | Facility: CLINIC | Age: 43
End: 2018-05-22
Payer: COMMERCIAL

## 2018-05-22 VITALS
SYSTOLIC BLOOD PRESSURE: 105 MMHG | DIASTOLIC BLOOD PRESSURE: 62 MMHG | WEIGHT: 127.6 LBS | TEMPERATURE: 98.9 F | BODY MASS INDEX: 21.78 KG/M2 | HEIGHT: 64 IN | HEART RATE: 75 BPM | OXYGEN SATURATION: 99 %

## 2018-05-22 DIAGNOSIS — K91.89 GASTRIC LEAK: Primary | ICD-10-CM

## 2018-05-22 PROCEDURE — 99212 OFFICE O/P EST SF 10 MIN: CPT | Performed by: THORACIC SURGERY (CARDIOTHORACIC VASCULAR SURGERY)

## 2018-05-22 NOTE — ASSESSMENT & PLAN NOTE
Ms Mateo Hendrix is making will progress in healing of this gastric cutaneous fistula  I have recommended that she continue her NPO status at least for a couple more weeks  I will see her back in 2 weeks after undergoing an IR tube check  I would like to see if contrast placed through the pigtail catheter makes it into her stomach  If the space around the tube is decreasing then interventional Radiology may consider downsizing the tube or pulling it back some  She will continue on her current tube feed regimen

## 2018-05-22 NOTE — PROGRESS NOTES
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  I have recommended that she continue her NPO status at least for a couple more weeks  I will see her back in 2 weeks after undergoing an IR tube check  I would like to see if contrast placed through the pigtail catheter makes it into her stomach  If the space around the tube is decreasing then interventional Radiology may consider downsizing the tube or pulling it back some  She will continue on her current tube feed regimen  Diagnoses and all orders for this visit:    Gastric leak  -     IR consult; Future          Thoracic History          Subjective:    Patient ID: Lolita Macdonald is a 36 y o  female  Ms Mateo Hendrix returns to the office today for further follow-up of her proximal gastric perforation  She has been tolerating nasal jejunal tube feeds well  She has gained between 5 and 6 lb over the last several weeks  She denies fever or chills  Her midline wound has stopped draining and she just keeps a dry dressing on this  Her pigtail catheter puts out 20-40 cc of cloudy fluid per day  She underwent a barium swallow which demonstrated a persistent leak but appeared much smaller  There was a thin tract that extended for a cm to out from the perforation but the contrast did not reach the pigtail catheter  The following portions of the patient's history were reviewed and updated as appropriate: allergies, current medications, past family history, past medical history, past social history, past surgical history and problem list     Review of Systems   Constitutional: Negative for chills, diaphoresis, fever and unexpected weight change  Respiratory: Negative for cough and shortness of breath  Cardiovascular: Negative for chest pain and leg swelling  Gastrointestinal: Negative for abdominal pain  Objective:   Physical Exam   Abdominal: Soft   Bowel sounds are normal  She exhibits no distension  There is no tenderness  Prior laparotomy incision is well healed with a small granuloma at the site of the previous gastric cutaneous fistula  There is a dry dressing on top of this and occasionally a scant amount of yellowish drainage  The pigtail catheter site is clean  The stitch holding it in place has come out of the skin and the catheter was held in place with tape  /62 (BP Location: Right arm, Patient Position: Sitting, Cuff Size: Adult)   Pulse 75   Temp 98 9 °F (37 2 °C)   Ht 5' 4" (1 626 m)   Wt 57 9 kg (127 lb 9 6 oz)   LMP  (LMP Unknown)   SpO2 99%   BMI 21 90 kg/m²       Fl Barium Swallow    Result Date: 5/21/2018  Narrative BARIUM SWALLOW-ESOPHAGRAM INDICATION:   K22 3: Perforation of esophagus  COMPARISON: Fluoroscopy study dated 2/23/2018 and CT dated 4/25/2018  IMAGES:  29 FLUOROSCOPY TIME:   1 8 MIN  TECHNIQUE: The patient was given Omnipaque and images of the esophagus and stomach were obtained  FINDINGS: The visualized esophagus is normal in caliber  Esophageal motility is normal and emptying of contrast from the esophagus is prompt  No mucosal lesion, ulceration or evidence of fold thickening is seen  There has been interval removal of a gastroesophageal stent  There is a linear region of contrast just at the gastroesophageal junction best seen on images 24 and 25 not reaching the pigtail catheter  This appears to correspond to a region of gastric-cutaneous fistula noted on prior CT study  This thin linear column of contrast does not reach the pigtail catheter  Impression Thin linear column of contrast extending off the ventral aspect of the gastroesophageal junction appears to correspond to gastric-cutaneous fistula noted on prior CT study  This contrast column does not appear to reach the draining pigtail catheter on this study although it may have simply been progressing slowly    Correlate with pigtail catheter output   I personally discussed this study with Danielle Gaytan, physician's assistant with DR Alistair Hdz on 5/21/2018 at 11:54 AM  Workstation performed: [id number]

## 2018-05-23 ENCOUNTER — TELEPHONE (OUTPATIENT)
Dept: CARDIAC SURGERY | Facility: CLINIC | Age: 43
End: 2018-05-23

## 2018-05-23 NOTE — TELEPHONE ENCOUNTER
Yessy Bowers from [facility] called this morning and wanted to know if pt is able to take Vitamin D and any other vitamins liquid through her feeding tube?  Please call Yessy Bowers at [phone]

## 2018-05-24 NOTE — TELEPHONE ENCOUNTER
Per Jd Herrera PA-C I called [facility] [employer] [facility] to see if they have a nutritionist or dietitian that can help with recommending a vitamin or supplement that can be given through patients tube feed  Patient is losing her hair and would like to know if can take something that can help prevent that  I spoke with [facility] and they do not supply any vitamins or supplements, they only supply food  They do have a dietitian that they can have call Janny Qureshi for a phone consult to see if she can recommend something OTC

## 2018-05-29 ENCOUNTER — HOSPITAL ENCOUNTER (OUTPATIENT)
Dept: RADIOLOGY | Facility: HOSPITAL | Age: 43
Discharge: HOME | End: 2018-05-29
Attending: THORACIC SURGERY (CARDIOTHORACIC VASCULAR SURGERY) | Admitting: RADIOLOGY
Payer: COMMERCIAL

## 2018-05-29 DIAGNOSIS — K91.89 GASTRIC LEAK: ICD-10-CM

## 2018-05-29 PROCEDURE — 49424 ASSESS CYST CONTRAST INJECT: CPT

## 2018-05-29 PROCEDURE — 76080 X-RAY EXAM OF FISTULA: CPT | Performed by: RADIOLOGY

## 2018-05-29 PROCEDURE — 76080 X-RAY EXAM OF FISTULA: CPT

## 2018-05-29 PROCEDURE — 49424 ASSESS CYST CONTRAST INJECT: CPT | Performed by: RADIOLOGY

## 2018-05-29 RX ADMIN — IOHEXOL 5 ML: 300 INJECTION, SOLUTION INTRAVENOUS at 11:08

## 2018-05-30 ENCOUNTER — TELEPHONE (OUTPATIENT)
Dept: RADIOLOGY | Facility: HOSPITAL | Age: 43
End: 2018-05-30

## 2018-05-30 RX ORDER — SODIUM CHLORIDE 9 MG/ML
75 INJECTION, SOLUTION INTRAVENOUS CONTINUOUS
Status: CANCELLED | OUTPATIENT
Start: 2018-05-30

## 2018-05-31 ENCOUNTER — TELEPHONE (OUTPATIENT)
Dept: RADIOLOGY | Facility: HOSPITAL | Age: 43
End: 2018-05-31

## 2018-06-01 ENCOUNTER — HOSPITAL ENCOUNTER (OUTPATIENT)
Dept: RADIOLOGY | Facility: HOSPITAL | Age: 43
Discharge: HOME | End: 2018-06-01
Attending: THORACIC SURGERY (CARDIOTHORACIC VASCULAR SURGERY)
Payer: COMMERCIAL

## 2018-06-01 VITALS
RESPIRATION RATE: 16 BRPM | WEIGHT: 128.38 LBS | HEART RATE: 68 BPM | SYSTOLIC BLOOD PRESSURE: 96 MMHG | TEMPERATURE: 98.5 F | DIASTOLIC BLOOD PRESSURE: 55 MMHG | HEIGHT: 64 IN | BODY MASS INDEX: 21.92 KG/M2 | OXYGEN SATURATION: 99 %

## 2018-06-01 DIAGNOSIS — K91.89 GASTRIC LEAK: ICD-10-CM

## 2018-06-01 PROCEDURE — 99152 MOD SED SAME PHYS/QHP 5/>YRS: CPT

## 2018-06-01 PROCEDURE — C1729 CATH, DRAINAGE: HCPCS

## 2018-06-01 PROCEDURE — 49423 EXCHANGE DRAINAGE CATHETER: CPT

## 2018-06-01 PROCEDURE — 75984 XRAY CONTROL CATHETER CHANGE: CPT

## 2018-06-01 PROCEDURE — 75984 XRAY CONTROL CATHETER CHANGE: CPT | Performed by: RADIOLOGY

## 2018-06-01 PROCEDURE — C1769 GUIDE WIRE: HCPCS

## 2018-06-01 PROCEDURE — 49423 EXCHANGE DRAINAGE CATHETER: CPT | Performed by: RADIOLOGY

## 2018-06-01 PROCEDURE — 99152 MOD SED SAME PHYS/QHP 5/>YRS: CPT | Performed by: RADIOLOGY

## 2018-06-01 PROCEDURE — 99153 MOD SED SAME PHYS/QHP EA: CPT

## 2018-06-01 RX ORDER — FENTANYL CITRATE 50 UG/ML
INJECTION, SOLUTION INTRAMUSCULAR; INTRAVENOUS CODE/TRAUMA/SEDATION MEDICATION
Status: COMPLETED | OUTPATIENT
Start: 2018-06-01 | End: 2018-06-01

## 2018-06-01 RX ORDER — MIDAZOLAM HYDROCHLORIDE 1 MG/ML
INJECTION INTRAMUSCULAR; INTRAVENOUS CODE/TRAUMA/SEDATION MEDICATION
Status: COMPLETED | OUTPATIENT
Start: 2018-06-01 | End: 2018-06-01

## 2018-06-01 RX ORDER — SODIUM CHLORIDE 9 MG/ML
75 INJECTION, SOLUTION INTRAVENOUS CONTINUOUS
Status: DISCONTINUED | OUTPATIENT
Start: 2018-06-01 | End: 2018-06-02 | Stop reason: HOSPADM

## 2018-06-01 RX ADMIN — FENTANYL CITRATE 50 MCG: 50 INJECTION INTRAMUSCULAR; INTRAVENOUS at 08:50

## 2018-06-01 RX ADMIN — IOHEXOL 12 ML: 300 INJECTION, SOLUTION INTRAVENOUS at 09:06

## 2018-06-01 RX ADMIN — MIDAZOLAM 2 MG: 1 INJECTION INTRAMUSCULAR; INTRAVENOUS at 08:57

## 2018-06-01 RX ADMIN — MIDAZOLAM 2 MG: 1 INJECTION INTRAMUSCULAR; INTRAVENOUS at 08:48

## 2018-06-01 RX ADMIN — FENTANYL CITRATE 50 MCG: 50 INJECTION INTRAMUSCULAR; INTRAVENOUS at 08:48

## 2018-06-01 RX ADMIN — SODIUM CHLORIDE 75 ML/HR: 0.9 INJECTION, SOLUTION INTRAVENOUS at 07:28

## 2018-06-01 RX ADMIN — FENTANYL CITRATE 50 MCG: 50 INJECTION INTRAMUSCULAR; INTRAVENOUS at 08:57

## 2018-06-01 NOTE — PROGRESS NOTES
D/C instructions reviewed w/ pt & spouse, verbalized understanding  Pt has had ADENIKE drain before so has all supplies at home  Stat lock applied for better security, extra given if needed  Reviewed how to empty bulb syringe drain

## 2018-06-01 NOTE — DISCHARGE INSTRUCTIONS
TUBE CARE INSTRUCTIONS    Care after your procedure:    Resume your normal diet  Small sips of flat soda will help with nausea  1  The properly functioning catheter should be forward flushed once (1x) daily with 10ml of normal saline using clean technique  You will be given a prescription for flushes  To flush the tube, clean both connections with alcohol swab  Twist off the drainage bag/ bulb  tubing and twist the saline syringe into the drainage tube and flush  Remove the syringe and twist the drainage bag / bulb tubing tubing back on     2  The drainage bag/bulb may be emptied as necessary  Keep a record of the amount of fluid you drain from your tube  This should be done with clean technique as well  3  A fresh dressing should be applied daily over the tube insertion site  4  As the tube is secured to the skin with only a suture,try not to pull on your tube  Tub baths are not permitted  Showers are permitted if the patient's skin entry site is prevented from getting wet  Similarly, washcloth "baths" are acceptable  Contact Interventional Radiology at [phone] Liam PATIENTS: Contact Interventional Radiology at [phone]) Shahid Shell PATIENTS: Contact Interventional Radiology at [phone]) if:    1  Leakage or large amounts of liquid around the catheter  2  Fever of 101 degrees lasting several hours without other obvious cause (such as sore throat, flu, etc)  3  Persistent nausea or vomiting  4  Diminished drainage, which may be associated with pressure or pain  Or when the     drainage from your tube is less than 10mls for 48 hours  5  Catheter pulled back or falls out  The following pharmacies carry the flush syringes         [facility]                     [facility]  [address]                         [address] [facility]  Phone [phone]            Phone [phone]   [place] [address]                                [phone]  [address] [facility] [address]                      [address] [place]  Phone [phone]            Phone [phone]                      Sheri Srivastava                                                                                                          [phone]  [facility]  [address]  Phone [phone]

## 2018-06-05 ENCOUNTER — OFFICE VISIT (OUTPATIENT)
Dept: CARDIAC SURGERY | Facility: CLINIC | Age: 43
End: 2018-06-05
Payer: COMMERCIAL

## 2018-06-05 VITALS
HEART RATE: 71 BPM | TEMPERATURE: 99.1 F | OXYGEN SATURATION: 98 % | HEIGHT: 64 IN | DIASTOLIC BLOOD PRESSURE: 80 MMHG | BODY MASS INDEX: 22.33 KG/M2 | WEIGHT: 130.8 LBS | SYSTOLIC BLOOD PRESSURE: 110 MMHG

## 2018-06-05 DIAGNOSIS — K25.5 GASTRIC PERFORATION (HCC): ICD-10-CM

## 2018-06-05 DIAGNOSIS — K91.89 GASTRIC LEAK: Primary | ICD-10-CM

## 2018-06-05 PROCEDURE — 99213 OFFICE O/P EST LOW 20 MIN: CPT | Performed by: THORACIC SURGERY (CARDIOTHORACIC VASCULAR SURGERY)

## 2018-06-05 NOTE — ASSESSMENT & PLAN NOTE
Ms Dianne Morse is progressing slowly from a thoracic surgery standpoint  She had her pigtail downsized and her ADENIKE drainage is slowly decreasing  Dr Janneth Skaggs is recommending to stay the course and hopefully it will close on its own in the next 3-4 weeks, since it is showing a slow improvement  If it does not, we can further discuss endoscopic closure with Dr Edison Eisenberg  She can back off on TF somewhat, but she was instructed to closely monitor her weight and adjust accordingly  She will return in 2 weeks with a new IR tube study  She is in agreement with the above plan

## 2018-06-05 NOTE — PROGRESS NOTES
Thoracic Follow-Up  Assessment/Plan:    Gastric perforation ([address] )  Ms Sue Hurtado is progressing slowly from a thoracic surgery standpoint  She had her pigtail downsized and her ADENIKE drainage is slowly decreasing  Dr Sindhu Mcnamara is recommending to stay the course and hopefully it will close on its own in the next 3-4 weeks, since it is showing a slow improvement  If it does not, we can further discuss endoscopic closure with Dr Maryanne Goddadr  She can back off on TF somewhat, but she was instructed to closely monitor her weight and adjust accordingly  She will return in 2 weeks with a new IR tube study  She is in agreement with the above plan  Diagnoses and all orders for this visit:    Gastric leak  -     IR tube check; Future    Gastric perforation [facility])          Thoracic History           Patient ID: Nancy Joy is a 36 y o  female  HPI    Ms Sue Hurtado continues to follow in our office for her proximal gastric perforation  She was last seen on 5/22/18 at which point she was tolerating nasal jejunal tube feeds well, had gained 5-6 lbs over the previous several weeks, and her midline wound had stopped draining  Her barium swallow from 5/21/18 had demonstrated a persistent leak, but much smaller in appearance    Her pigtail catheter was putting out 20-40 cc of cloudy fluid per day and therefore underwent downsizing of the catheter to a 10F on 6/1/18  She returns today, and is doing pretty well  She maintains on J1 2 @105 mL/hr for 17 hours and is tolerating it well  She is 130 lbs today and was 114 lb when she was discharged from the hospital in April 2018  She denies fever, chills, nausea  She had one episode of vomiting last week of sour tannish bile, but no further episodes since then  She is draining about 15-30 mL of yellow/clear fluid from her ADENIKE drain daily  Ms Sue Hurtado is extremely frustrated with her NJ tube and her quality of life        Review of Systems   Constitutional: Negative for chills, diaphoresis, fever and unexpected weight change  Respiratory: Negative for cough and shortness of breath  Cardiovascular: Negative for chest pain and leg swelling  Gastrointestinal: Positive for vomiting  Negative for abdominal pain and nausea  Objective:   Physical Exam   Constitutional: She is oriented to person, place, and time  She appears well-developed and well-nourished  Cardiovascular: Normal rate and regular rhythm  Pulmonary/Chest: Effort normal and breath sounds normal  No respiratory distress  Abdominal: Soft  Bowel sounds are normal  She exhibits no distension  There is no tenderness  Prior laparotomy incision well healed with a small granuloma at the site of the previous gastric cutaneous fistula  There is a dry dressing on top of this with no visible drainage  The pigtail catheter site is clean  Neurological: She is alert and oriented to person, place, and time  Skin: Skin is warm and dry  She is not diaphoretic  Psychiatric: She has a normal mood and affect  Her behavior is normal    Vitals reviewed  /80 (BP Location: Left arm, Patient Position: Sitting, Cuff Size: Adult)   Pulse 71   Temp 99 1 °F (37 3 °C)   Ht 5' 4" (1 626 m)   Wt 59 3 kg (130 lb 12 8 oz)   LMP  (LMP Unknown)   SpO2 98%   BMI 22 45 kg/m²   /80 (BP Location: Left arm, Patient Position: Sitting, Cuff Size: Adult)   Pulse 71   Temp 99 1 °F (37 3 °C)   Ht 5' 4" (1 626 m)   Wt 59 3 kg (130 lb 12 8 oz)   LMP  (LMP Unknown)   SpO2 98%   BMI 22 45 kg/m²          Fl Barium Swallow    Result Date: 5/21/2018  Narrative BARIUM SWALLOW-ESOPHAGRAM INDICATION:   K22 3: Perforation of esophagus  COMPARISON: Fluoroscopy study dated 2/23/2018 and CT dated 4/25/2018  IMAGES:  29 FLUOROSCOPY TIME:   1 8 MIN  TECHNIQUE: The patient was given Omnipaque and images of the esophagus and stomach were obtained  FINDINGS: The visualized esophagus is normal in caliber    Esophageal motility is normal and emptying of contrast from the esophagus is prompt  No mucosal lesion, ulceration or evidence of fold thickening is seen  There has been interval removal of a gastroesophageal stent  There is a linear region of contrast just at the gastroesophageal junction best seen on images 24 and 25 not reaching the pigtail catheter  This appears to correspond to a region of gastric-cutaneous fistula noted on prior CT study  This thin linear column of contrast does not reach the pigtail catheter  Impression Thin linear column of contrast extending off the ventral aspect of the gastroesophageal junction appears to correspond to gastric-cutaneous fistula noted on prior CT study  This contrast column does not appear to reach the draining pigtail catheter on this study although it may have simply been progressing slowly  Correlate with pigtail catheter output   I personally discussed this study with Alo Bridges, physician's assistant with DR Frederic Simpson on 5/21/2018 at 11:54 AM  Workstation performed: [id number]

## 2018-06-14 ENCOUNTER — TELEPHONE (OUTPATIENT)
Dept: CARDIAC SURGERY | Facility: CLINIC | Age: 43
End: 2018-06-14

## 2018-06-14 NOTE — TELEPHONE ENCOUNTER
Patient currently tube feeding 18 hours on and 6 hours off, she wanted to know if she can decrease her tube feeding 5 hours, and feed from midnight till 1:00pm in the afternoon, as this is her husbands work shift and he is the one that starts and ends the tube feeding process as the patient does not know how to do it and relies on   I checked with Heaven Berger PA-C, she doesn't recommend patient decreasing 5 hours so quickly, would recommend gradually decreasing maybe starting with 3 hours first, but patient is unable to do the process herself and does not want to have the feeding on all afternoon  Per Lani Duran PA-C if patient does the 5 hour decrease she must weight herself daily and if she loses any weight she must calls and as she will be bumped back up  Patient understood

## 2018-06-18 ENCOUNTER — HOSPITAL ENCOUNTER (OUTPATIENT)
Dept: RADIOLOGY | Facility: HOSPITAL | Age: 43
Discharge: HOME | End: 2018-06-18
Admitting: RADIOLOGY
Payer: COMMERCIAL

## 2018-06-18 DIAGNOSIS — K91.89 GASTRIC LEAK: ICD-10-CM

## 2018-06-18 PROCEDURE — 76080 X-RAY EXAM OF FISTULA: CPT

## 2018-06-18 PROCEDURE — 49424 ASSESS CYST CONTRAST INJECT: CPT

## 2018-06-18 PROCEDURE — 49424 ASSESS CYST CONTRAST INJECT: CPT | Performed by: RADIOLOGY

## 2018-06-18 PROCEDURE — 76080 X-RAY EXAM OF FISTULA: CPT | Performed by: RADIOLOGY

## 2018-06-18 RX ADMIN — IOHEXOL 2 ML: 300 INJECTION, SOLUTION INTRAVENOUS at 10:32

## 2018-06-18 NOTE — SEDATION DOCUMENTATION
Superior abdominal drain tube check  Drain continues to be patent for tan fluids approximately 15 ml per day  Will maintain drain to bulb suction as per Dr Makayla Puente

## 2018-06-19 ENCOUNTER — OFFICE VISIT (OUTPATIENT)
Dept: CARDIAC SURGERY | Facility: CLINIC | Age: 43
End: 2018-06-19
Payer: COMMERCIAL

## 2018-06-19 VITALS
DIASTOLIC BLOOD PRESSURE: 63 MMHG | HEART RATE: 78 BPM | HEIGHT: 64 IN | OXYGEN SATURATION: 97 % | BODY MASS INDEX: 22.57 KG/M2 | WEIGHT: 132.2 LBS | SYSTOLIC BLOOD PRESSURE: 116 MMHG | TEMPERATURE: 98.1 F

## 2018-06-19 DIAGNOSIS — K25.5 GASTRIC PERFORATION (HCC): Primary | ICD-10-CM

## 2018-06-19 PROCEDURE — 99211 OFF/OP EST MAY X REQ PHY/QHP: CPT | Performed by: THORACIC SURGERY (CARDIOTHORACIC VASCULAR SURGERY)

## 2018-06-19 NOTE — LETTER
June 19, 2018     [facility]yanne Garcia[facility] [address] [facility]    Patient: Alo Cordoba   YOB: 1975   Date of Visit: 6/19/2018       Dear Dr Guevara Peer: Thank you for referring Octavio Drake to me for evaluation  Below are my notes for this consultation  If you have questions, please do not hesitate to call me  I look forward to following your patient along with you  Sincerely,        Tucker Borges MD        CC: Gerhardt Ely, MD Ronny Lints, MD  6/19/2018  9:39 AM  Sign at close encounter  Thoracic Follow-Up  Assessment/Plan:    Gastric perforation [facility])  Ms Yevgeniy Foote has been able to do a lot of healing since her original presentation with her gastric perforation  Despite this, she has now 6 months out with a persistent fistulous tract  I have spoken with Dr Ata Carney who will see her later this week  He will offer her an endoscopic suturing or clipping device to try and close the fistula from the inside  She is doing well nutritionally so I think this is the proper time to trying get her to heal this up  Diagnoses and all orders for this visit:    Gastric perforation [facility])  -     Ambulatory referral to Bariatric Surgery; Future          Thoracic History          Subjective:    Patient ID: Alo Cordoba is a 36 y o  female  Ms Yevgeniy Foote continues to follow in our office for her proximal gastric perforation  She has been managed for many months now with a naso jejunal tube at home  After her last visit she had her IR pigtail drain downsized and this is been putting out 20-40 cc per day  She has been maintaining her weight and doing very well on her tube feeds  She does complain of some hair loss  She denies any fever, chills, nausea or vomiting  She underwent a tube check a couple days ago and this demonstrates a very thin tract between the IR drain and her proximal stomach  There was no evidence of a cavity          The following portions of the patient's history were reviewed and updated as appropriate: allergies, current medications, past family history, past medical history, past social history, past surgical history and problem list     Review of Systems      Objective:   Physical Exam   Abdominal: Soft  Bowel sounds are normal  There is no tenderness  Drain site is clean  Musculoskeletal: She exhibits no edema  Skin: Skin is warm and dry  /63 (BP Location: Left arm, Patient Position: Sitting, Cuff Size: Adult)   Pulse 78   Temp 98 1 °F (36 7 °C)   Ht 5' 4" (1 626 m)   Wt 60 kg (132 lb 3 2 oz)   LMP  (LMP Unknown)   SpO2 97%   BMI 22 69 kg/m²      IR tube check on June 18, 2018 is personally reviewed  DRAINAGE CATHETER STUDY     Indication: Gastric injury with persistent leak managed with percutaneous drain and post pyloric feeding tube  She has had approximately 20 mL of turbid fluid drainage daily  No longer has drainage through midline incision      Procedure: The upper abdominal drainage catheter was hand injected with contrast and fluoroscopic spot images were obtained      Contrast: 2 cc Omnipaque-300     Fluoroscopy time: 0 27 minutes     Images: 4     Findings: The catheter remains in satisfactory position   There is no residual abscess cavity  There is immediate opacification of a very thin tract leading to the region of the GE junction just above the suture line  No contrast is seen tracking to   the anterior subcutaneous soft tissues as seen previously or elsewhere      IMPRESSION:  Impression: Very thin but persistent fistula to the region of the GE junction  No residual abscess cavity    No significant change since May 29      Plan: Continue bulb drainage without irrigation

## 2018-06-19 NOTE — ASSESSMENT & PLAN NOTE
Ms Daya Louis has been able to do a lot of healing since her original presentation with her gastric perforation  Despite this, she has now 6 months out with a persistent fistulous tract  I have spoken with Dr Chel Cason who will see her later this week  He will offer her an endoscopic suturing or clipping device to try and close the fistula from the inside  She is doing well nutritionally so I think this is the proper time to trying get her to heal this up

## 2018-06-19 NOTE — PROGRESS NOTES
Thoracic Follow-Up  Assessment/Plan:    Gastric perforation ([address] )  Ms Joyce Scott has been able to do a lot of healing since her original presentation with her gastric perforation  Despite this, she has now 6 months out with a persistent fistulous tract  I have spoken with Dr Terrence Greer who will see her later this week  He will offer her an endoscopic suturing or clipping device to try and close the fistula from the inside  She is doing well nutritionally so I think this is the proper time to trying get her to heal this up  Diagnoses and all orders for this visit:    Gastric perforation [facility])  -     Ambulatory referral to Bariatric Surgery; Future          Thoracic History          Subjective:    Patient ID: Joan Mcfarlane is a 36 y o  female  Ms Joyce Scott continues to follow in our office for her proximal gastric perforation  She has been managed for many months now with a naso jejunal tube at home  After her last visit she had her IR pigtail drain downsized and this is been putting out 20-40 cc per day  She has been maintaining her weight and doing very well on her tube feeds  She does complain of some hair loss  She denies any fever, chills, nausea or vomiting  She underwent a tube check a couple days ago and this demonstrates a very thin tract between the IR drain and her proximal stomach  There was no evidence of a cavity  The following portions of the patient's history were reviewed and updated as appropriate: allergies, current medications, past family history, past medical history, past social history, past surgical history and problem list     Review of Systems      Objective:   Physical Exam   Abdominal: Soft  Bowel sounds are normal  There is no tenderness  Drain site is clean  Musculoskeletal: She exhibits no edema  Skin: Skin is warm and dry     /63 (BP Location: Left arm, Patient Position: Sitting, Cuff Size: Adult)   Pulse 78   Temp 98 1 °F (36 7 °C)   Ht 5' 4" (1 626 m)   Wt 60 kg (132 lb 3 2 oz)   LMP  (LMP Unknown)   SpO2 97%   BMI 22 69 kg/m²     IR tube check on June 18, 2018 is personally reviewed  DRAINAGE CATHETER STUDY     Indication: Gastric injury with persistent leak managed with percutaneous drain and post pyloric feeding tube  She has had approximately 20 mL of turbid fluid drainage daily  No longer has drainage through midline incision      Procedure: The upper abdominal drainage catheter was hand injected with contrast and fluoroscopic spot images were obtained      Contrast: 2 cc Omnipaque-300     Fluoroscopy time: 0 27 minutes     Images: 4     Findings: The catheter remains in satisfactory position   There is no residual abscess cavity  There is immediate opacification of a very thin tract leading to the region of the GE junction just above the suture line  No contrast is seen tracking to   the anterior subcutaneous soft tissues as seen previously or elsewhere      IMPRESSION:  Impression: Very thin but persistent fistula to the region of the GE junction  No residual abscess cavity    No significant change since May 29      Plan: Continue bulb drainage without irrigation

## 2018-06-22 ENCOUNTER — OFFICE VISIT (OUTPATIENT)
Dept: BARIATRICS | Facility: CLINIC | Age: 43
End: 2018-06-22
Payer: COMMERCIAL

## 2018-06-22 ENCOUNTER — TELEPHONE (OUTPATIENT)
Dept: BARIATRICS | Facility: CLINIC | Age: 43
End: 2018-06-22

## 2018-06-22 VITALS
HEIGHT: 63 IN | WEIGHT: 133 LBS | TEMPERATURE: 98.4 F | SYSTOLIC BLOOD PRESSURE: 100 MMHG | BODY MASS INDEX: 23.57 KG/M2 | DIASTOLIC BLOOD PRESSURE: 70 MMHG | HEART RATE: 56 BPM

## 2018-06-22 DIAGNOSIS — IMO0001 POSTOPERATIVE INTRA-ABDOMINAL ABSCESS, SUBSEQUENT ENCOUNTER: ICD-10-CM

## 2018-06-22 DIAGNOSIS — Z98.84 BARIATRIC SURGERY STATUS: ICD-10-CM

## 2018-06-22 DIAGNOSIS — K91.89 GASTRIC LEAK: Primary | ICD-10-CM

## 2018-06-22 DIAGNOSIS — K25.5 GASTRIC PERFORATION (HCC): ICD-10-CM

## 2018-06-22 DIAGNOSIS — D64.9 LOW HEMOGLOBIN AND LOW HEMATOCRIT: ICD-10-CM

## 2018-06-22 DIAGNOSIS — E44.0 MODERATE PROTEIN-CALORIE MALNUTRITION (HCC): Primary | ICD-10-CM

## 2018-06-22 PROCEDURE — 99212 OFFICE O/P EST SF 10 MIN: CPT | Performed by: SURGERY

## 2018-06-22 RX ORDER — NORTRIPTYLINE HYDROCHLORIDE 10 MG/1
10 CAPSULE ORAL
COMMUNITY
End: 2018-08-03

## 2018-06-22 RX ORDER — NICOTINE POLACRILEX 2 MG
GUM BUCCAL
COMMUNITY
Start: 2012-02-22 | End: 2018-08-14 | Stop reason: ALTCHOICE

## 2018-06-22 RX ORDER — LANOLIN ALCOHOL/MO/W.PET/CERES
CREAM (GRAM) TOPICAL
COMMUNITY
Start: 2017-03-30 | End: 2018-08-14 | Stop reason: ALTCHOICE

## 2018-06-22 RX ORDER — METOCLOPRAMIDE 10 MG/1
TABLET ORAL
COMMUNITY
Start: 2017-03-11 | End: 2018-08-03

## 2018-06-22 RX ORDER — ACETAMINOPHEN 160 MG
2 TABLET,DISINTEGRATING ORAL DAILY
COMMUNITY
Start: 2017-03-30 | End: 2018-08-14 | Stop reason: ALTCHOICE

## 2018-06-22 RX ORDER — CALCIUM CARBONATE/VITAMIN D3 500-10/5ML
LIQUID (ML) ORAL
COMMUNITY
Start: 2017-03-11 | End: 2018-08-14 | Stop reason: ALTCHOICE

## 2018-06-22 RX ORDER — RIBOFLAVIN (VITAMIN B2) 400 MG
TABLET ORAL
COMMUNITY
Start: 2017-03-11 | End: 2018-08-14 | Stop reason: ALTCHOICE

## 2018-06-22 RX ORDER — EAR PLUGS
1 EACH OTIC (EAR) DAILY
COMMUNITY
Start: 2017-03-30 | End: 2018-08-14 | Stop reason: ALTCHOICE

## 2018-06-22 RX ORDER — BUTALBITAL, ACETAMINOPHEN AND CAFFEINE 300; 40; 50 MG/1; MG/1; MG/1
1 CAPSULE ORAL
COMMUNITY
End: 2018-08-03

## 2018-06-22 RX ORDER — PANTOPRAZOLE SODIUM 40 MG/1
40 TABLET, DELAYED RELEASE ORAL
Status: ON HOLD | COMMUNITY
Start: 2017-02-18 | End: 2018-06-27

## 2018-06-22 RX ORDER — GABAPENTIN 300 MG/1
CAPSULE ORAL
COMMUNITY
Start: 2016-09-22 | End: 2018-08-03

## 2018-06-22 NOTE — TELEPHONE ENCOUNTER
Called and spoke with patient per referral from Dr Angel Nuñez MD     Patient was here today for initial consultation with Dr Ranjit Bryant  She has been fed with N/J tube and complaining of extreme hair loss  Contacted patient  She is receiving Jevity 1 2 105 ml/hr for 11 hours to provide 1386 kcal and 53 grams of protein  Jevity provides 10 4 m,cg of vitamin B12 ( recommendation is 350 mcg to 500 mcg) , 20  8 mg of iron ( recommendation of 45 mg ), 2 66 mg of thiamine ( recommendation si 12  Mg of thiamine)  Patient complains of numbness/stiffness of hands upon waking in the morning  Last blood work was from April while patient while still in the hospital   Spoke with patient and recommended that she get her iron levels, protein levels and B12 and thiamine levels checked, due to low B vitamin content of her tube feedings  Pending labs results, will recommend liquid vitamins or prosource to meet her nutritional needs  Patient is agreeable to checking her labs    Mailed out lab slip

## 2018-06-22 NOTE — PROGRESS NOTES
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  All risks and benefits were discussed  We will also coordinate with the patient's home health provider to increase her vitamin supplementation  Diagnoses and all orders for this visit:    Gastric leak    Gastric perforation ([facility] [address] )  -     Ambulatory referral to Bariatric Surgery    Postoperative intra-abdominal abscess, subsequent encounter [facility])    Bariatric surgery status          Subjective: patient doing well  She is eager to attempt closure of her fistula tract in progress to eventual removal of her drain and nasal jejunal tube  Patient ID: Bernardo Grayson is a 36 y o  female  HPI complicated history following a leak after sleeve gastrectomy resulting in prolonged hospitalization at [facility] this year  Referred to our office by Dr Eudelia Sicard after extensive review of diagnostic results and other outpatient follow-up      Review of Systems    Denies fever  + subjective chills  Denies lightheadedness  + hair loss  Denies visual changes  Denies dyspnea, cough  Denies chest pain  Denies nausea/vomiting  + hunger  Denies change in urinary/bowel habits  Denies masses/hernias  Denies parasthesia  Denies peripheral edema    Objective:     Physical Exam    Vitals:    06/22/18 1156   BP: 100/70   Pulse: 56   Temp: 98 4 °F (36 9 °C)     NAD, alert  Mild pallor  NJ tube in place  MMM  No JVD  RRR  Normal inspiratory effort  Abdomen soft, NT/ND  IR-placed drain with serous fluid    Drainage is between 10 and 20 cc a day, occasionally maroon tinged but usually clear and serous  No peripheral edema  Normal affect, no agitation

## 2018-06-26 ENCOUNTER — TELEPHONE (OUTPATIENT)
Dept: BARIATRICS | Facility: CLINIC | Age: 43
End: 2018-06-26

## 2018-06-26 ENCOUNTER — ANESTHESIA EVENT (OUTPATIENT)
Dept: GASTROENTEROLOGY | Facility: HOSPITAL | Age: 43
End: 2018-06-26
Payer: COMMERCIAL

## 2018-06-26 NOTE — TELEPHONE ENCOUNTER
Called to discuss vitamin and mineral requirements  Provided education on her vitamin and mineral needs and told patient that the office will be ordering liquid vitamins for her  The vitamins/minerals will need to bolused through her j - tube  Patient stated that she still receives visiting nursing services and the nurses can show her how to bolus and flush the tube  Her pump currently does her water boluses  Patient has EGD scheduled for tomorrow, so she will get her blood work done at that time - prealbumin, iron and B vitamins   Patient uses [address] in [facility] for her pharmacy, will contact Amy to see if the orders should be sent to [facility] or [facility]  Patient verbalized understanding

## 2018-06-27 ENCOUNTER — HOSPITAL ENCOUNTER (OUTPATIENT)
Facility: HOSPITAL | Age: 43
Setting detail: OUTPATIENT SURGERY
Discharge: HOME | End: 2018-06-27
Attending: SURGERY | Admitting: SURGERY
Payer: COMMERCIAL

## 2018-06-27 ENCOUNTER — ANESTHESIA (OUTPATIENT)
Dept: GASTROENTEROLOGY | Facility: HOSPITAL | Age: 43
End: 2018-06-27
Payer: COMMERCIAL

## 2018-06-27 ENCOUNTER — APPOINTMENT (OUTPATIENT)
Dept: RADIOLOGY | Facility: HOSPITAL | Age: 43
End: 2018-06-27
Payer: COMMERCIAL

## 2018-06-27 VITALS
WEIGHT: 129 LBS | OXYGEN SATURATION: 98 % | SYSTOLIC BLOOD PRESSURE: 120 MMHG | TEMPERATURE: 98.7 F | HEART RATE: 79 BPM | BODY MASS INDEX: 22.86 KG/M2 | RESPIRATION RATE: 16 BRPM | HEIGHT: 63 IN | DIASTOLIC BLOOD PRESSURE: 72 MMHG

## 2018-06-27 DIAGNOSIS — Z98.84 BARIATRIC SURGERY STATUS: Primary | ICD-10-CM

## 2018-06-27 PROCEDURE — C9113 INJ PANTOPRAZOLE SODIUM, VIA: HCPCS | Performed by: SURGERY

## 2018-06-27 PROCEDURE — 43235 EGD DIAGNOSTIC BRUSH WASH: CPT | Performed by: SURGERY

## 2018-06-27 PROCEDURE — 74240 X-RAY XM UPR GI TRC 1CNTRST: CPT

## 2018-06-27 RX ORDER — PROPOFOL 10 MG/ML
INJECTION, EMULSION INTRAVENOUS CONTINUOUS PRN
Status: DISCONTINUED | OUTPATIENT
Start: 2018-06-27 | End: 2018-06-27 | Stop reason: SURG

## 2018-06-27 RX ORDER — ONDANSETRON 4 MG/1
4 TABLET, ORALLY DISINTEGRATING ORAL EVERY 6 HOURS PRN
Qty: 20 TABLET | Refills: 0 | Status: SHIPPED | OUTPATIENT
Start: 2018-06-27 | End: 2018-08-03

## 2018-06-27 RX ORDER — ONDANSETRON 2 MG/ML
INJECTION INTRAMUSCULAR; INTRAVENOUS AS NEEDED
Status: DISCONTINUED | OUTPATIENT
Start: 2018-06-27 | End: 2018-06-27 | Stop reason: SURG

## 2018-06-27 RX ORDER — PANTOPRAZOLE SODIUM 40 MG/1
40 INJECTION, POWDER, FOR SOLUTION INTRAVENOUS ONCE
Status: COMPLETED | OUTPATIENT
Start: 2018-06-27 | End: 2018-06-27

## 2018-06-27 RX ORDER — ONDANSETRON 2 MG/ML
8 INJECTION INTRAMUSCULAR; INTRAVENOUS ONCE
Status: DISCONTINUED | OUTPATIENT
Start: 2018-06-27 | End: 2018-06-27

## 2018-06-27 RX ORDER — DEXAMETHASONE SODIUM PHOSPHATE 10 MG/ML
4 INJECTION, SOLUTION INTRAMUSCULAR; INTRAVENOUS ONCE
Status: COMPLETED | OUTPATIENT
Start: 2018-06-27 | End: 2018-06-27

## 2018-06-27 RX ORDER — PROPOFOL 10 MG/ML
INJECTION, EMULSION INTRAVENOUS AS NEEDED
Status: DISCONTINUED | OUTPATIENT
Start: 2018-06-27 | End: 2018-06-27 | Stop reason: SURG

## 2018-06-27 RX ORDER — SODIUM CHLORIDE 9 MG/ML
125 INJECTION, SOLUTION INTRAVENOUS CONTINUOUS
Status: DISCONTINUED | OUTPATIENT
Start: 2018-06-27 | End: 2018-06-27 | Stop reason: HOSPADM

## 2018-06-27 RX ORDER — ONDANSETRON 2 MG/ML
8 INJECTION INTRAMUSCULAR; INTRAVENOUS ONCE
Status: COMPLETED | OUTPATIENT
Start: 2018-06-27 | End: 2018-06-27

## 2018-06-27 RX ADMIN — PROPOFOL 100 MG: 10 INJECTION, EMULSION INTRAVENOUS at 10:33

## 2018-06-27 RX ADMIN — ONDANSETRON 8 MG: 2 INJECTION INTRAMUSCULAR; INTRAVENOUS at 11:32

## 2018-06-27 RX ADMIN — PROPOFOL 50 MG: 10 INJECTION, EMULSION INTRAVENOUS at 10:31

## 2018-06-27 RX ADMIN — PROPOFOL 70 MG: 10 INJECTION, EMULSION INTRAVENOUS at 10:29

## 2018-06-27 RX ADMIN — PROPOFOL 50 MG: 10 INJECTION, EMULSION INTRAVENOUS at 11:02

## 2018-06-27 RX ADMIN — PROPOFOL 100 MG: 10 INJECTION, EMULSION INTRAVENOUS at 10:38

## 2018-06-27 RX ADMIN — PROPOFOL 30 MG: 10 INJECTION, EMULSION INTRAVENOUS at 10:26

## 2018-06-27 RX ADMIN — PROPOFOL 60 MCG/KG/MIN: 10 INJECTION, EMULSION INTRAVENOUS at 10:42

## 2018-06-27 RX ADMIN — IOHEXOL 100 ML: 350 INJECTION, SOLUTION INTRAVENOUS at 15:39

## 2018-06-27 RX ADMIN — DEXAMETHASONE SODIUM PHOSPHATE 4 MG: 10 INJECTION, SOLUTION INTRAMUSCULAR; INTRAVENOUS at 15:51

## 2018-06-27 RX ADMIN — PROPOFOL 170 MG: 10 INJECTION, EMULSION INTRAVENOUS at 10:20

## 2018-06-27 RX ADMIN — PROPOFOL 50 MG: 10 INJECTION, EMULSION INTRAVENOUS at 10:35

## 2018-06-27 RX ADMIN — PROPOFOL 50 MG: 10 INJECTION, EMULSION INTRAVENOUS at 10:49

## 2018-06-27 RX ADMIN — TRIMETHOBENZAMIDE HYDROCHLORIDE 200 MG: 100 INJECTION INTRAMUSCULAR at 12:26

## 2018-06-27 RX ADMIN — ONDANSETRON HYDROCHLORIDE 4 MG: 2 INJECTION, SOLUTION INTRAVENOUS at 11:11

## 2018-06-27 RX ADMIN — SODIUM CHLORIDE 125 ML/HR: 0.9 INJECTION, SOLUTION INTRAVENOUS at 09:52

## 2018-06-27 RX ADMIN — PROPOFOL 50 MG: 10 INJECTION, EMULSION INTRAVENOUS at 10:52

## 2018-06-27 RX ADMIN — PANTOPRAZOLE SODIUM 40 MG: 40 INJECTION, POWDER, FOR SOLUTION INTRAVENOUS at 15:57

## 2018-06-27 RX ADMIN — SODIUM CHLORIDE 125 ML/HR: 0.9 INJECTION, SOLUTION INTRAVENOUS at 12:18

## 2018-06-27 RX ADMIN — PROPOFOL 30 MG: 10 INJECTION, EMULSION INTRAVENOUS at 10:22

## 2018-06-27 RX ADMIN — LIDOCAINE HYDROCHLORIDE 50 MG: 20 INJECTION, SOLUTION INTRAVENOUS at 10:20

## 2018-06-27 NOTE — PRE-PROCEDURE INSTRUCTIONS
Patient admitted to GI Lab with Dobhoff Tube in place in left nare and clamped and J Ferro closed drainage tube in place mid epigastric area

## 2018-06-27 NOTE — DISCHARGE INSTRUCTIONS
Upper Endoscopy   WHAT YOU NEED TO KNOW:   An upper endoscopy is also called an upper gastrointestinal (GI) endoscopy, or an esophagogastroduodenoscopy (EGD)  You may feel bloated, gassy, or have some abdominal discomfort after your procedure  Your throat may be sore for 24 to 36 hours  You may burp or pass gas from air that is still inside your body  DISCHARGE INSTRUCTIONS:   Call 911 if:   · You have sudden chest pain or trouble breathing  Seek care immediately if:   · You feel dizzy or faint  · You have trouble swallowing  · You have severe throat pain  · Your bowel movements are very dark or black  · Your abdomen is hard and firm and you have severe pain  · You vomit blood  Contact your healthcare provider if:   · You feel full or bloated and cannot burp or pass gas  · You have not had a bowel movement for 3 days after your procedure  · You have neck pain  · You have a fever or chills  · You have nausea or are vomiting  · You have a rash or hives  · You have questions or concerns about your endoscopy  Relieve a sore throat:  Suck on throat lozenges or crushed ice  Gargle with a small amount of warm salt water  Mix 1 teaspoon of salt and 1 cup of warm water to make salt water  Relieve gas and discomfort from bloating:  Lie on your right side with a heating pad on your abdomen  Take short walks to help pass gas  Eat small meals until bloating is relieved  Rest after your procedure:  Do not drive or make important decisions until the day after your procedure  Return to your normal activity as directed  You can usually return to work the day after your procedure  Follow up with your healthcare provider as directed:  Write down your questions so you remember to ask them during your visits  © 2017 Juan Antonio[employer] Whitt[employer] Information is for End User's use only and may not be sold, redistributed or otherwise used for commercial purposes   All illustrations and images included in CareNotes® are the copyrighted property of A D A M , Inc  or Donald Villa  The above information is an  only  It is not intended as medical advice for individual conditions or treatments  Talk to your doctor, nurse or pharmacist before following any medical regimen to see if it is safe and effective for you  Upper GI Series   WHAT YOU NEED TO KNOW:   An upper gastrointestinal (GI) series is an x-ray of the esophagus, stomach, and the upper part of the small intestine  An x-ray that only takes pictures of your throat and esophagus is called a barium swallow  Liquid barium (a white chalky liquid) or another type of contrast liquid is given to help the pictures show up more clearly  This test may be done to find the cause of problems such as trouble swallowing, nausea, vomiting, abdominal pain, or reflux  DISCHARGE INSTRUCTIONS:   Seek care immediately if:   · You do not have a bowel movement for 2 days after this test     · You have severe abdominal pain  Contact your healthcare provider if:   · You have severe constipation  · You cannot pass gas  · You have a fever  · You have questions or concerns about your condition or care  Medicines:   · Laxatives  may help relax and loosen your intestines to help you have a bowel movement  · Take your medicine as directed  Contact your healthcare provider if you think your medicine is not helping or if you have side effects  Tell him or her if you are allergic to any medicine  Keep a list of the medicines, vitamins, and herbs you take  Include the amounts, and when and why you take them  Bring the list or the pill bottles to follow-up visits  Carry your medicine list with you in case of an emergency  Bowel movements:   The barium liquid may cause your bowel movements to turn a gray or white color for 48 to 72 hours after the test   Drink liquids as directed:  Ask your healthcare provider how much liquid to drink each day and which liquids are best for you  Liquids will help flush the barium from your system and prevent or help relieve constipation  Eat high-fiber foods: This may help decrease constipation by adding bulk to your bowel movements  High-fiber foods include fruit, vegetables, whole-grain breads and cereals, and beans  Follow up with your healthcare provider as directed: You may need to return to go over the results of your test  Write down your questions so you remember to ask them during your visits  © 2017 [address] [employer] Whittaker Information is for End User's use only and may not be sold, redistributed or otherwise used for commercial purposes  All illustrations and images included in CareNotes® are the copyrighted property of A D A M , Inc  or Donald Villa  The above information is an  only  It is not intended as medical advice for individual conditions or treatments  Talk to your doctor, nurse or pharmacist before following any medical regimen to see if it is safe and effective for you

## 2018-06-27 NOTE — NURSING NOTE
Padlock clip used  REF 3 [id number]  Lot# [id number]   EXPIRATION DATE   [facility] is product manufacture or [address] [place] [address] Avenue by [address] Endoscopy rep

## 2018-06-27 NOTE — H&P (VIEW-ONLY)
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  All risks and benefits were discussed  We will also coordinate with the patient's home health provider to increase her vitamin supplementation  Diagnoses and all orders for this visit:    Gastric leak    Gastric perforation ([address] )  -     Ambulatory referral to Bariatric Surgery    Postoperative intra-abdominal abscess, subsequent encounter [facility])    Bariatric surgery status          Subjective: patient doing well  She is eager to attempt closure of her fistula tract in progress to eventual removal of her drain and nasal jejunal tube  Patient ID: Andrea Terry is a 36 y o  female  HPI complicated history following a leak after sleeve gastrectomy resulting in prolonged hospitalization at [facility] earlier this year  Referred to our office by Dr Julio Mitchell after extensive review of diagnostic results and other outpatient follow-up      Review of Systems    Denies fever  + subjective chills  Denies lightheadedness  + hair loss  Denies visual changes  Denies dyspnea, cough  Denies chest pain  Denies nausea/vomiting  + hunger  Denies change in urinary/bowel habits  Denies masses/hernias  Denies parasthesia  Denies peripheral edema    Objective:     Physical Exam    Vitals:    06/22/18 1156   BP: 100/70   Pulse: 56   Temp: 98 4 °F (36 9 °C)     NAD, alert  Mild pallor  NJ tube in place  MMM  No JVD  RRR  Normal inspiratory effort  Abdomen soft, NT/ND  IR-placed drain with serous fluid    Drainage is between 10 and 20 cc a day, occasionally maroon tinged but usually clear and serous  No peripheral edema  Normal affect, no agitation

## 2018-06-27 NOTE — ANESTHESIA PREPROCEDURE EVALUATION
Review of Systems/Medical History  Patient summary reviewed  Chart reviewed  No history of anesthetic complications     Cardiovascular    Comment: ECHO 1/ 2018--EF 68%, normal systolic fxn, no reg abnml,  Pulmonary  Smoker ex-smoker  ,        GI/Hepatic    GERD well controlled, Bariatric surgery,   Comment: proximal gastric perforation following hiatal hernia repair, s/p multiple surgical interventions including esophageal stent with removal    On TPN    Has nausea, reglan and zofran provided     Negative  ROS        Endo/Other    Comment: Right eye choroidal nevus    GYN  Negative gynecology ROS          Hematology  Negative hematology ROS      Musculoskeletal  Negative musculoskeletal ROS   Arthritis     Neurology    Headaches,   Comment: Idiopathic intracranial hypertension--s/p  shunt 5/30/2017--in light of current sepsis, shunt removed 2/5/18 Psychology   Negative psychology ROS              Physical Exam    Airway    Mallampati score: II  TM Distance: >3 FB  Neck ROM: full     Dental   No notable dental hx     Cardiovascular  Rhythm: regular, Rate: normal, Cardiovascular exam normal    Pulmonary  Pulmonary exam normal     Other Findings        Anesthesia Plan  ASA Score- 3     Anesthesia Type- IV sedation with anesthesia with ASA Monitors  Additional Monitors:   Airway Plan:         Plan Factors-Patient not instructed to abstain from smoking on day of procedure  Patient did not smoke on day of surgery  Induction- intravenous  Postoperative Plan-     Informed Consent- Anesthetic plan and risks discussed with patient  I personally reviewed this patient with the CRNA  Discussed and agreed on the Anesthesia Plan with the CRNA  Katrina Reid

## 2018-06-27 NOTE — PROGRESS NOTES
Patient arrived in recovery vomiting clear emesis  Dr Lashawn Monterroso aware  Orders received for Zofran IV which was given  Patient continued to wretch and vomit  Dr Miroslava Fernandes aware  Dr Lashawn Monterroso aware and Tigjulieta ordered and administered  For Xray, but patient vomiting prevented her from going there when they called for her at 1215  Dr Gloria Garcia aware  He will stop to see patient when he becomes available  1300 Patient resting comfortably

## 2018-06-28 DIAGNOSIS — K91.89 GASTRIC LEAK: Primary | ICD-10-CM

## 2018-06-28 DIAGNOSIS — K25.5 GASTRIC PERFORATION (HCC): ICD-10-CM

## 2018-06-28 DIAGNOSIS — Z98.84 BARIATRIC SURGERY STATUS: ICD-10-CM

## 2018-06-28 DIAGNOSIS — E44.0 MODERATE PROTEIN-CALORIE MALNUTRITION (HCC): ICD-10-CM

## 2018-06-28 RX ORDER — MULTIVIT WITH IRON,MINERALS
15 LIQUID (ML) ORAL DAILY
Qty: 236 ML | Refills: 0 | Status: SHIPPED | OUTPATIENT
Start: 2018-06-28 | End: 2018-08-14 | Stop reason: ALTCHOICE

## 2018-06-28 RX ORDER — B1/B2/B3/B5/B6/IRON/METH/CHOLN 2.5-18/15
15 LIQUID (ML) ORAL DAILY
Qty: 354 ML | Refills: 0 | Status: SHIPPED | OUTPATIENT
Start: 2018-06-28 | End: 2018-08-14 | Stop reason: ALTCHOICE

## 2018-06-28 RX ORDER — FERROUS SULFATE 7.5 MG/0.5
5 SYRINGE (EA) ORAL DAILY
Qty: 50 ML | Refills: 0 | Status: SHIPPED | OUTPATIENT
Start: 2018-06-28 | End: 2018-08-03

## 2018-06-28 NOTE — PROGRESS NOTES
Encounter opened to place orders for nutritional supplementation via the patient's tube feeds  These dietary supplements may be fulfilled at her pharmacy  Home health will be able to administer and instruct the patient on further maintenance    Recommendations are as follows:    -15 mL of Liquid Centrum with iron:  Bolus once per day into J-tube, flush with 30 L of water before and after vitamin bolus  -15 mL of Geritol High Potency B vitamins with iron liquid supplement -bolus once per day into J tube, flush with 30 mL before and after vitamin bolus  -5 mL of Ferrous Sulfate elixir:  Bolus once per day into J-tube, flush with 30 mL before and after vitamin bolus    If there are any questions please do not hesitate to contact our office at [phone] attn: Dr Martha Wolfe

## 2018-07-03 DIAGNOSIS — K21.00 GASTROESOPHAGEAL REFLUX DISEASE WITH ESOPHAGITIS: Primary | ICD-10-CM

## 2018-07-05 ENCOUNTER — HOSPITAL ENCOUNTER (OUTPATIENT)
Dept: RADIOLOGY | Facility: HOSPITAL | Age: 43
Discharge: HOME | End: 2018-07-05
Attending: SURGERY
Payer: COMMERCIAL

## 2018-07-05 ENCOUNTER — OFFICE VISIT (OUTPATIENT)
Dept: BARIATRICS | Facility: CLINIC | Age: 43
End: 2018-07-05

## 2018-07-05 ENCOUNTER — TRANSCRIBE ORDERS (OUTPATIENT)
Dept: RADIOLOGY | Facility: HOSPITAL | Age: 43
End: 2018-07-05

## 2018-07-05 ENCOUNTER — OFFICE VISIT (OUTPATIENT)
Dept: BARIATRICS | Facility: CLINIC | Age: 43
End: 2018-07-05
Payer: COMMERCIAL

## 2018-07-05 VITALS
SYSTOLIC BLOOD PRESSURE: 100 MMHG | HEART RATE: 74 BPM | WEIGHT: 130.5 LBS | BODY MASS INDEX: 23.12 KG/M2 | DIASTOLIC BLOOD PRESSURE: 70 MMHG | TEMPERATURE: 98.1 F | HEIGHT: 63 IN

## 2018-07-05 DIAGNOSIS — K21.00 GASTROESOPHAGEAL REFLUX DISEASE WITH ESOPHAGITIS: Primary | ICD-10-CM

## 2018-07-05 DIAGNOSIS — K91.2 POSTSURGICAL MALABSORPTION: Primary | ICD-10-CM

## 2018-07-05 DIAGNOSIS — K21.00 GASTROESOPHAGEAL REFLUX DISEASE WITH ESOPHAGITIS: ICD-10-CM

## 2018-07-05 PROCEDURE — 99214 OFFICE O/P EST MOD 30 MIN: CPT | Performed by: SURGERY

## 2018-07-05 PROCEDURE — 74240 X-RAY XM UPR GI TRC 1CNTRST: CPT

## 2018-07-05 PROCEDURE — RECHECK

## 2018-07-05 RX ORDER — OMEPRAZOLE 40 MG/1
40 CAPSULE, DELAYED RELEASE ORAL
Qty: 60 CAPSULE | Refills: 3 | Status: SHIPPED | OUTPATIENT
Start: 2018-07-05 | End: 2018-08-03

## 2018-07-05 RX ADMIN — IOHEXOL 50 ML: 350 INJECTION, SOLUTION INTRAVENOUS at 08:30

## 2018-07-05 NOTE — LETTER
July 5, 2018     Marcos Graham MD  [address] [facility] [address]  [facility] [address] [address]    Patient: Glenys Randall   YOB: 1975   Date of Visit: 7/5/2018       Dear Dr Lily Salinas: Thank you for referring Jeremy Spears to me for evaluation  Below are my notes for this consultation  If you have questions, please do not hesitate to call me  I look forward to following your patient along with you  Sincerely,        Olga Miguel MD        CC: MD Olga Cortez MD  7/5/2018  9:57 AM  Sign at close encounter    Follow Up - Bariatric Surgery   Glenys Randall 36 y o  female MRN: [id number]  Unit/Bed#:  Encounter: [id number]            Subjective:  Patient is presenting for follow-up a repeat upper GI showed no evidence of contrast extravasation or leak after she underwent an upper endoscopy and closure of the gastric cutaneous fistula    Objective:         Lab, Imaging and other studies: I have personally reviewed pertinent labs  Family History: non-contributory    Meds/Allergies   all medications and allergies reviewed    Vitals: Blood pressure 100/70, pulse 74, temperature 98 1 °F (36 7 °C), height 5' 3" (1 6 m), weight 59 2 kg (130 lb 8 oz), not currently breastfeeding  ,Body mass index is 23 12 kg/m²  [unfilled]    Invasive Devices     Drain            NG/OG/Enteral Tube -- days    Closed/Suction Drain Midline;Superior Abdomen Other (Comment) 10 Fr  138 days    NG/OG/Enteral Tube Nasogastric 8 Fr Left nares 85 days                Physical Exam:     NAD  Abdomen soft non-tender, incisions well healed  No palpable hernias    Assessment/Plan:    patient is s/p endoscopic closure of gastro-cutaneous fistula, her most recent upper GI showed no evidence of leak her feeding tube was removed today in the office patient will be started on a liquid diet and also liquid vitamins, she will also be maintained on twice daily PPI and I will see her again next week              Olga Miguel MD

## 2018-07-05 NOTE — PROGRESS NOTES
Bariatric Follow Up Nutrition Note      Type of surgery  Vertical sleeve gastrectomy  Surgery Date: 2016  2 years years  post-op  Surgeon: outside hospital    Nutrition Assessment   Shweta Nolan  36 y o   female  not currently breastfeeding  Review of History and Medications   Past Medical History:   Diagnosis Date    Abdominal pain     Brain condition     Pseudotumor Cerebri     Esophageal perforation     Gastric leak     GERD (gastroesophageal reflux disease)     Migraine     Obesity     Papilledema, both eyes     Presence of lumboperitoneal shunt     Resolved: Sep 20, 2017    Rotator cuff tendinitis     Resolved: Aug 23, 2017    Visual field defect      Past Surgical History:   Procedure Laterality Date    CSF SHUNT      LP shunt - 2015 -  shunt - 2017    ESOPHAGOGASTRODUODENOSCOPY N/A 2/23/2018    Procedure: ESOPHAGOGASTRODUODENOSCOPY (EGD) WITH ESOPHAGEAL STENT PLACEMENT;  Surgeon: Belia Marshall MD;  [facility];  Service: Thoracic    ESOPHAGOGASTRODUODENOSCOPY N/A 2/23/2018    Procedure: ESOPHAGOGASTRODUODENOSCOPY (EGD) WITH REMOVAL ESOPHAGEAL STENT  AND REPLACEMENT WITH 23mm X155mm [address];  Surgeon: Belia Marshall MD;  [facility];  Service: Thoracic    ESOPHAGOGASTRODUODENOSCOPY N/A 3/28/2018    Procedure: ESOPHAGOGASTRODUODENOSCOPY (EGD) with PEJ placement ;  Surgeon: Kush Cevallos MD;  [facility]; Service: Gastroenterology    ESOPHAGOGASTRODUODENOSCOPY N/A 4/5/2018    Procedure: ESOPHAGOGASTRODUODENOSCOPY (EGD) with botox injection and kaofed placement;  Surgeon: Luly Hugo DO;  [facility]; Service: Gastroenterology    ESOPHAGOGASTRODUODENOSCOPY N/A 4/10/2018    Procedure: ESOPHAGOGASTRODUODENOSCOPY (EGD) with Kaofed placement;  Surgeon: Marlene Fox MD;  [facility];   Service: Gastroenterology    ESOPHAGOSCOPY WITH STENT INSERTION N/A 1/24/2018    Procedure: INSERTION STENT ESOPHAGEAL;  Surgeon: Elías Torres MD;  [facility]; Service: Gastroenterology    EYE SURGERY      for "crossed eyed" (in 2nd grade)     GASTRIC BYPASS  2016    HERNIA REPAIR      HYSTERECTOMY      LAPAROTOMY N/A 1/25/2018    Procedure: Exploratory Laparotomy, wash out,placement of drains, placement of NG feeding tube ; Surgeon: Jazmine Ellis DO;  [facility];  Service: General    CA CREATE SHUNT:VENTRIC-PERITONEAL Right 5/31/2017    Procedure: IMAGE GUIDED CORONAL PLACEMENT OF PROGRAMABLE VENTRICULAR-PERITONEAL SHUNT, REMOVAL OF LP SHUNT ;  Surgeon: Gm Acevedo MD;  [facility];  Service: Neurosurgery    CA EGD TRANSORAL BIOPSY SINGLE/MULTIPLE N/A 6/27/2018    Procedure: ESOPHAGOGASTRODUODENOSCOPY (EGD) with padlock clip placement;  Surgeon: Cary Schmidt MD;  [facility];   Service: Mirella Nigel CSF SHUNT,W/O REPLACE Right 2/5/2018    Procedure: Removal of  shunt;  Surgeon: Gm Acevedo MD;  [facility];  Service: Neurosurgery    CA REPLACEMENT/REVISION,CSF SHUNT Right 1/25/2018    Procedure: Externalization of right-sided SHUNT VENTRICULAR-PERITONEAL in anterior chest wall ribs two and three level  ;  Surgeon: Viv Valenzuela MD;  [facility];  Service: Neurosurgery     Social History     Social History    Marital status: Single     Spouse name: N/A    Number of children: 2    Years of education: [employer] or GED      Occupational History    employed       Social History Main Topics    Smoking status: Former Smoker     Packs/day: 0 50     Years: 20 00     Quit date: 2012    Smokeless tobacco: Never Used    Alcohol use No    Drug use: No    Sexual activity: Yes     Other Topics Concern    Not on file     Social History Narrative    ** Merged History Encounter **            Current Outpatient Prescriptions:     acetaminophen (TYLENOL) 160 mg/5 mL suspension, Take 20 3 mL (650 mg total) by mouth every 6 (six) hours as needed for mild pain Administered via nasoenteric feeding tube  , Disp: 118 mL, Rfl: 0    Biotin 1 MG CAPS, as directed, Disp: , Rfl:     Butalbital-APAP-Caffeine -40 MG CAPS, Take 1 capsule by mouth, Disp: , Rfl:     Calcium 500-100 MG-UNIT CHEW, Chew, Disp: , Rfl:     Cholecalciferol (VITAMIN D3) 2000 units capsule, Take 2 capsules by mouth daily, Disp: , Rfl:     Cyanocobalamin (VITAMIN B-12) 1000 MCG/15ML LIQD, Take 1 tablet by mouth daily, Disp: , Rfl:     ferrous sulfate (BELLO-IN-SOL) 75 (15 Fe) mg/mL drops, 0 07 mL (5 25 mg total) by Per J Tube route daily, Disp: 50 mL, Rfl: 0    FIBER SELECT GUMMIES PO, Take by mouth, Disp: , Rfl:     gabapentin (NEURONTIN) 300 mg capsule, take 1 capsule by oral route at bedtime  , Disp: , Rfl:     Iron-Vitamins (GERITOL) LIQD, 15 mL by Per Nasojejunal Tube route daily, Disp: 354 mL, Rfl: 0    Magnesium Oxide 400 MG CAPS, Reported on 4/3/2017, Disp: , Rfl:     metoclopramide (REGLAN) 10 mg tablet, Reported on 4/3/2017, Disp: , Rfl:     Multiple Vitamins-Minerals (MULTIVITAMIN WITH IRON-MINERALS) liquid, 15 mL by Per J Tube route daily, Disp: 236 mL, Rfl: 0    nortriptyline (PAMELOR) 10 mg capsule, Take 10 mg by mouth, Disp: , Rfl:     omeprazole (PriLOSEC) 40 MG capsule, Take 1 capsule (40 mg total) by mouth 2 (two) times a day, Disp: 60 capsule, Rfl: 3    ondansetron (ZOFRAN-ODT) 4 mg disintegrating tablet, Take 1 tablet (4 mg total) by mouth every 6 (six) hours as needed for nausea or vomiting, Disp: 20 tablet, Rfl: 0    oxyCODONE (ROXICODONE) 5 mg/5 mL solution, Take 5-10 mL (5-10 mg total) by mouth every 4 (four) hours as needed for moderate pain or severe pain Max Daily Amount: 60 mg, Disp: 473 mL, Rfl: 0    polyethylene glycol (MIRALAX) 17 g packet, Take 17 g by mouth daily Via keofed tube  , Disp: 14 each, Rfl: 0    Riboflavin 400 MG TABS, TAKE 1 CAPSULE BY MOUTH DAILY FOR 30 DAYS, Disp: , Rfl:   No current facility-administered medications for this visit  Food Intake and Lifestyle Assessment   Pt was getting Jevity 1 2 at 105lm/hr x 11hrs for 1386 cals and 53gm protein  Tube was just removed today in office so patient can start eating and drinking PO  Psychosocial Assessment   Support systems: spouse  Socioeconomic factors: none    Nutrition Diagnosis  Diagnosis: Altered GI function (NC-1 4)  Related to: Altered GI function  As Evidenced by: s/p  endoscopic closure of gastro-cutaneous fistula w/ NJ tube in place for several months  Interventions and Teaching   Patient educated on post-op nutrition guidelines  Patient educated and handouts provided  Diet progression  Vitamin / Mineral supplementation of Multivitamin with minerals and Calcium  -suggested celebrate 3 in 1 drink (powder to mix into water, 2 sticks per day) + 1 upcal powdered calcium to meet 1500mg calcium  Pt was happy with suggestion because the liquid ones she was putting through the tube did not smell good and the solid ones she was taking previous to all this were a lot of separate tablets from OTC  Education provided to: patient and     Barriers to learning: No barriers identified    Readiness to change: action    Comprehension: demonstrated understanding and verbalizes understanding     Expected Compliance: excellent    Goals     Pt w/complicated history following a leak after sleeve gastrectomy resulting in prolonged hospitalization at [facility] earlier this year  Had nasal jejunal tube in place s/p endoscopic closure of gastro-cutaneous fistula  Her most recent upper GI showed no evidence of leak  Her feeding tube was removed today in the office  Patient will be started on a liquid diet and also liquid vitamins and f/u with surgeon in one week  Reviewed liquid diet and protein supplements she should stay on for one week    Once she comes back for f/u surgeon will advise if she can progress on to purees and then softs, but did review pureed and soft diet for future reference  Also, provided with suggestions of liquid bariatric supplements to start  Suggested Celebrate 3 in 1 stick packs, 2 per day mixed with water + 1 Upcal powdered calcium (500mg) per day to meet 1500mg because the Celebrate stick packs contain 1000mg calcium for two packs  Pt is excited to starting taking PO  Reminded pt to eat slow, take 20 mins to eat her meal and sip, don't chug her fluids  Pt and  verbalize understanding      Time Spent:   30 Minutes

## 2018-07-05 NOTE — PROGRESS NOTES
Follow Up - Bariatric Surgery   Pauly Rogers 36 y o  female MRN: [id number]  Unit/Bed#:  Encounter: [id number]            Subjective:  Patient is presenting for follow-up a repeat upper GI showed no evidence of contrast extravasation or leak after she underwent an upper endoscopy and closure of the gastric cutaneous fistula    Objective:         Lab, Imaging and other studies: I have personally reviewed pertinent labs  Family History: non-contributory    Meds/Allergies   all medications and allergies reviewed    Vitals: Blood pressure 100/70, pulse 74, temperature 98 1 °F (36 7 °C), height 5' 3" (1 6 m), weight 59 2 kg (130 lb 8 oz), not currently breastfeeding  ,Body mass index is 23 12 kg/m²  [unfilled]    Invasive Devices     Drain            NG/OG/Enteral Tube -- days    Closed/Suction Drain Midline;Superior Abdomen Other (Comment) 10 Fr  138 days    NG/OG/Enteral Tube Nasogastric 8 Fr Left nares 85 days                Physical Exam:     NAD  Abdomen soft non-tender, incisions well healed  No palpable hernias    Assessment/Plan:    patient is s/p endoscopic closure of gastro-cutaneous fistula, her most recent upper GI showed no evidence of leak her feeding tube was removed today in the office patient will be started on a liquid diet and also liquid vitamins, she will also be maintained on twice daily PPI and I will see her again next week              Lalita Sandoval MD

## 2018-07-10 ENCOUNTER — TELEPHONE (OUTPATIENT)
Dept: BARIATRICS | Facility: CLINIC | Age: 43
End: 2018-07-10

## 2018-07-10 DIAGNOSIS — K91.89 GASTRIC LEAK: Primary | ICD-10-CM

## 2018-07-10 NOTE — TELEPHONE ENCOUNTER
I called and left patient a message to call the office to schedule UGI   Order was placed by Vincenzo Huerta

## 2018-07-11 ENCOUNTER — HOSPITAL ENCOUNTER (OUTPATIENT)
Dept: RADIOLOGY | Facility: HOSPITAL | Age: 43
Discharge: HOME | End: 2018-07-11
Payer: COMMERCIAL

## 2018-07-11 DIAGNOSIS — K91.89 GASTRIC LEAK: ICD-10-CM

## 2018-07-11 PROCEDURE — 74240 X-RAY XM UPR GI TRC 1CNTRST: CPT

## 2018-07-11 RX ADMIN — IOHEXOL 25 ML: 350 INJECTION, SOLUTION INTRAVENOUS at 09:10

## 2018-07-13 ENCOUNTER — OFFICE VISIT (OUTPATIENT)
Dept: BARIATRICS | Facility: CLINIC | Age: 43
End: 2018-07-13
Payer: COMMERCIAL

## 2018-07-13 VITALS
TEMPERATURE: 96.9 F | HEIGHT: 63 IN | BODY MASS INDEX: 23.04 KG/M2 | SYSTOLIC BLOOD PRESSURE: 100 MMHG | HEART RATE: 68 BPM | WEIGHT: 130 LBS | DIASTOLIC BLOOD PRESSURE: 60 MMHG

## 2018-07-13 DIAGNOSIS — L98.8 FISTULA: Primary | ICD-10-CM

## 2018-07-13 PROCEDURE — 99214 OFFICE O/P EST MOD 30 MIN: CPT | Performed by: SURGERY

## 2018-07-13 NOTE — LETTER
July 13, 2018     Sathishmario Graham, [address]  [address] [place] [address]    Patient: Glenys Randall   YOB: 1975   Date of Visit: 7/13/2018       Dear Dr Lily Salinas: Thank you for referring Jeremy Spears to me for evaluation  Below are my notes for this consultation  If you have questions, please do not hesitate to call me  I look forward to following your patient along with you  Sincerely,        Olga Miguel MD        CC: No Recipients  Olga Miguel MD  7/13/2018 10:50 AM  Sign at close encounter    Follow Up - Bariatric Surgery   Glenys Randall 36 y o  female MRN: [id number]  Unit/Bed#:  Encounter: [id number]            Subjective:  Patient is presenting to review her upper GI findings her upper GI was repeated because of the foul smelling discharge in her ADENIKE drain  Upper GI was negative for leak her drain stopped draining and she is doing very well with the liquid diet and she denies nausea vomiting fever or chills    Objective:         Lab, Imaging and other studies: I have personally reviewed pertinent labs  Family History: non-contributory    Meds/Allergies   all medications and allergies reviewed    Vitals: Blood pressure 100/60, pulse 68, temperature (!) 96 9 °F (36 1 °C), height 5' 3" (1 6 m), weight 59 kg (130 lb), not currently breastfeeding  ,Body mass index is 23 03 kg/m²  [unfilled]    Invasive Devices     Drain            NG/OG/Enteral Tube -- days    Closed/Suction Drain Midline;Superior Abdomen Other (Comment) 10 Fr   146 days    NG/OG/Enteral Tube Nasogastric 8 Fr Left nares 93 days                Physical Exam:     NAD  Abdomen soft non-tender, incisions well healed  No palpable hernias  ADENIKE drain in place    Assessment/Plan:    patient is s/p endoscopic closure of gastric fistula patient remains stable upper GI performed on 3 separate occasions revealed no evidence of leak her feeding tube was removed patient is tolerating her diet, her diet to be advanced to pureed diet today and I will set her for a tube study in IR after which we will discuss potential removal of her IR drain               Bee Crawley MD

## 2018-07-13 NOTE — PROGRESS NOTES
Follow Up - Bariatric Surgery   Dane Morlaes 36 y o  female MRN: [id number]  Unit/Bed#:  Encounter: [id number]            Subjective:  Patient is presenting to review her upper GI findings her upper GI was repeated because of the foul smelling discharge in her ADENIKE drain  Upper GI was negative for leak her drain stopped draining and she is doing very well with the liquid diet and she denies nausea vomiting fever or chills    Objective:         Lab, Imaging and other studies: I have personally reviewed pertinent labs  Family History: non-contributory    Meds/Allergies   all medications and allergies reviewed    Vitals: Blood pressure 100/60, pulse 68, temperature (!) 96 9 °F (36 1 °C), height 5' 3" (1 6 m), weight 59 kg (130 lb), not currently breastfeeding  ,Body mass index is 23 03 kg/m²  [unfilled]    Invasive Devices     Drain            NG/OG/Enteral Tube -- days    Closed/Suction Drain Midline;Superior Abdomen Other (Comment) 10 Fr  146 days    NG/OG/Enteral Tube Nasogastric 8 Fr Left nares 93 days                Physical Exam:     NAD  Abdomen soft non-tender, incisions well healed  No palpable hernias  ADENIKE drain in place    Assessment/Plan:    patient is s/p endoscopic closure of gastric fistula patient remains stable upper GI performed on 3 separate occasions revealed no evidence of leak her feeding tube was removed patient is tolerating her diet, her diet to be advanced to pureed diet today and I will set her for a tube study in IR after which we will discuss potential removal of her IR drain               Ziggy Carlos MD

## 2018-07-17 ENCOUNTER — HOSPITAL ENCOUNTER (OUTPATIENT)
Dept: RADIOLOGY | Facility: HOSPITAL | Age: 43
Discharge: HOME | End: 2018-07-17
Attending: SURGERY | Admitting: RADIOLOGY
Payer: COMMERCIAL

## 2018-07-17 DIAGNOSIS — L98.8 FISTULA: ICD-10-CM

## 2018-07-17 PROCEDURE — 49424 ASSESS CYST CONTRAST INJECT: CPT

## 2018-07-17 PROCEDURE — 76080 X-RAY EXAM OF FISTULA: CPT

## 2018-07-17 PROCEDURE — 49424 ASSESS CYST CONTRAST INJECT: CPT | Performed by: RADIOLOGY

## 2018-07-17 PROCEDURE — 76080 X-RAY EXAM OF FISTULA: CPT | Performed by: RADIOLOGY

## 2018-07-17 RX ADMIN — IOHEXOL 4 ML: 300 INJECTION, SOLUTION INTRAVENOUS at 13:39

## 2018-07-17 NOTE — SEDATION DOCUMENTATION
Abdominal drain check, per Dr Galvin Early tube to remain intact to drainage bag  No flush and 1 week follow up

## 2018-07-19 ENCOUNTER — OFFICE VISIT (OUTPATIENT)
Dept: BARIATRICS | Facility: CLINIC | Age: 43
End: 2018-07-19
Payer: COMMERCIAL

## 2018-07-19 VITALS
TEMPERATURE: 98 F | HEIGHT: 63 IN | BODY MASS INDEX: 23.48 KG/M2 | SYSTOLIC BLOOD PRESSURE: 110 MMHG | HEART RATE: 72 BPM | RESPIRATION RATE: 18 BRPM | WEIGHT: 132.5 LBS | DIASTOLIC BLOOD PRESSURE: 70 MMHG

## 2018-07-19 DIAGNOSIS — E66.01 MORBID (SEVERE) OBESITY DUE TO EXCESS CALORIES (HCC): Primary | ICD-10-CM

## 2018-07-19 PROCEDURE — 99214 OFFICE O/P EST MOD 30 MIN: CPT | Performed by: SURGERY

## 2018-07-19 NOTE — PROGRESS NOTES
Follow Up - Bariatric Surgery   Linda Beyer 36 y o  female MRN: [id number]  Unit/Bed#:  Encounter: [id number]            Subjective:  Patient is presenting for follow-up after she underwent a tube study through her IR drain  tube study showed a questionable fistula to her lower esophagus    Objective:         Lab, Imaging and other studies: I have personally reviewed pertinent labs  Family History: non-contributory    Meds/Allergies   all medications and allergies reviewed    Vitals: Blood pressure 110/70, pulse 72, temperature 98 °F (36 7 °C), resp  rate 18, height 5' 3" (1 6 m), weight 60 1 kg (132 lb 8 oz), not currently breastfeeding  ,Body mass index is 23 47 kg/m²      [unfilled]    Invasive Devices     Drain            NG/OG/Enteral Tube -- days    Closed/Suction Drain Midline;Superior Abdomen Other (Comment) 10 Fr  152 days    NG/OG/Enteral Tube Nasogastric 8 Fr Left nares 99 days                Physical Exam:     NAD  Abdomen soft non-tender, incisions well healed  No palpable hernias    Assessment/Plan:    Tube was placed to gravity patient will undergo a repeat study next week  In the meantime patient diet was advanced to a soft diet              Cassius Morfin MD

## 2018-07-24 ENCOUNTER — HOSPITAL ENCOUNTER (OUTPATIENT)
Dept: RADIOLOGY | Facility: HOSPITAL | Age: 43
Discharge: HOME | End: 2018-07-24
Attending: SURGERY | Admitting: RADIOLOGY
Payer: COMMERCIAL

## 2018-07-24 DIAGNOSIS — L98.8 FISTULA: ICD-10-CM

## 2018-07-24 PROCEDURE — 49424 ASSESS CYST CONTRAST INJECT: CPT | Performed by: RADIOLOGY

## 2018-07-24 PROCEDURE — 49424 ASSESS CYST CONTRAST INJECT: CPT

## 2018-07-24 PROCEDURE — 76080 X-RAY EXAM OF FISTULA: CPT

## 2018-07-24 PROCEDURE — 76080 X-RAY EXAM OF FISTULA: CPT | Performed by: RADIOLOGY

## 2018-07-24 RX ADMIN — IOHEXOL 3 ML: 300 INJECTION, SOLUTION INTRAVENOUS at 10:24

## 2018-08-02 NOTE — PROGRESS NOTES
Patient ID: Addison Contreras is a 36 y o  female  Assessment/Plan:    Pseudotumor cerebri  Needs emergent evaluation with sudden onset of symptoms  Patient had  shunt removed 2/5/18 secondary to sepsis  Will need CT of head  Will need LP with opening pressure with twilight sedation  Depending on OP will likely need to start Diamox or another agent to decrease ICP  Needs to see opthalmology           Problem List Items Addressed This Visit        Digestive    Gastric perforation ([address] )    Relevant Medications    PANTOPRAZOLE SODIUM PO       Cardiovascular and Mediastinum    Pseudotumor cerebri - Primary     Needs emergent evaluation with sudden onset of symptoms  Patient had  shunt removed 2/5/18 secondary to sepsis  Will need CT of head  Will need LP with opening pressure with twilight sedation  Depending on OP will likely need to start Diamox or another agent to decrease ICP  Needs to see opthalmology           Idiopathic intracranial hypertension       Nervous and Auditory    Infection of  (ventriculoperitoneal) shunt, subsequent encounter       Other    S/P  shunt              Subjective:    HPI  We had the pleasure of evaluating Rain Overcast in neurological follow-up today  As you know she is a 36year old right-handed female here for evaluation of headaches  She is s/p lumboperitoneal shunt placement for increased intracranial pressure, performed at [facility] in 2015 and most recently removal of the shunt and placement of a  shunt in May of 2017  She is also status post gastric bypass surgery and has lost 60lbs  She saw her ophthalmologist in July and there was no optic nerve swelling  Went to [facility] for hiatal hernia and was in ICU for a long time  Was septic after her surgeries and had her  shunt removed 2/2018 due to concerns of bacterial infection spreading through the shunt    Approximately 2 weeks ago began noticing pressure in her head after getting out a car    This was intermittent  Pain woke her up yesterday from sleep  It is presenting like it initially did before diagnosis  No drastic vision changes since complaining of worsening pressure  HEADACHES  Any family history of aneurysms - Father, unknown type of aneurysm  Family history of migraine headaches - no    What is your current pain level - 7/10, yesterday was a 10/10  Headaches started at what age - November 2015  Accident or injury prior to onset of headaches - no  How often do the headaches occur - frontal headache/pressure 3x per week, shock type headaches occur intermittently  What time of the day do the headaches start - can wake up with them  How long do the headaches last - shock type 2-3 hours, frontal constant  Are you ever headache free - improved with  shunt placement  Where are they located - Frontal  What is the intensity of pain - At its worst, 10/10, pressure sensation  Any warning prior to headache and how long do they last - N/A, they're constant  Describe your usual headache - Pressure, "like my brain is going to explode out of my skull"  Associated symptoms:   - Problem with concentration  - Blurred vision occasionally  - Dizziness when extending or flexing neck to look up or down  - prefer to be alone and in a dark room, unable to work  Headache are worse if the patient: cough, sneeze, bending over, moving bowel, running or exercising,   Headache trigger: N/A, they're constant  Are you current pregnant or planning on getting pregnant? no  What time of the year do headaches occur more frequently- N/A, they're constant  Have you seen someone else for headaches or pain - [facility], who referred her here  Have you had trigger point injection performed and how often - no  Have you had Botox injection performed and how often - no  Have you had epidural injections or transforaminal injections performed - no  What medications do you take or have you taken for your headaches? PREVENTIVE: Nortriptyline (this helped her headaches), Gabapentin (stopped on her own), Diamox (stopped by provider because of her history of kidney stones), Verapamil (fatigue), Protriptyline  ABORTIVE: Fioricet (stopped on her own), Tramadol (stopped on her own), Toradol (stopped on her own), Indomethacin  Non-Medical/Alternative Treatments used in the past for headaches: Has tried Massage (doesnt help headaches), Chiropractic adjustment (doesnt help with headaches), Acupuncture (doesnt help with headaches)    MRAs of the head and neck without contrast done on 3/29/17 are both unremarkable  Brain MRI without contrast on 3/27/17 shows ventricles in the lower limits of normal in size consistent with pseudotumor cerebri, as well as scattered parenchymal WM changes which are nonspecific  Head CT 7/17: No acute intracranial abnormality  LP @ [facility] April/ 2017: OP 20, CP14        The following portions of the patient's history were reviewed and updated as appropriate: allergies, current medications, past family history, past medical history, past social history, past surgical history and problem list          Objective:    Height 5' 3" (1 6 m), weight 59 kg (130 lb), not currently breastfeeding  Physical Exam   Constitutional: She appears well-developed and well-nourished  HENT:   Head: Normocephalic and atraumatic  Eyes: Pupils are equal, round, and reactive to light  Neck: Normal range of motion  Cardiovascular: Normal rate  Pulmonary/Chest: Effort normal    Musculoskeletal: Normal range of motion  Skin: Skin is warm and dry  Psychiatric: She has a normal mood and affect  Her speech is normal    Nursing note and vitals reviewed  Neurological Exam    Mental Status  The patient is and oriented to person, place, time, and situation  She has no visuospatial neglect  Her speech is normal  Her language is fluent with no aphasia  She has normal attention span and concentration   She has a normal fund of knowledge  Cranial Nerves    CN III, IV, VI: The patient's pupils are equally round and reactive to light  ROS:    Review of Systems   Constitutional: Negative  HENT: Negative  Eyes: Negative  Respiratory: Negative  Cardiovascular: Negative  Gastrointestinal: Negative  Endocrine: Negative  Genitourinary: Negative  Musculoskeletal: Positive for gait problem  Skin: Negative  Allergic/Immunologic: Negative  Neurological:        Head pressure    Hematological: Negative  Psychiatric/Behavioral: Negative

## 2018-08-03 ENCOUNTER — HOSPITAL ENCOUNTER (EMERGENCY)
Facility: HOSPITAL | Age: 43
Discharge: HOME | End: 2018-08-03
Attending: EMERGENCY MEDICINE | Admitting: EMERGENCY MEDICINE
Payer: COMMERCIAL

## 2018-08-03 ENCOUNTER — OFFICE VISIT (OUTPATIENT)
Dept: NEUROLOGY | Facility: CLINIC | Age: 43
End: 2018-08-03
Payer: COMMERCIAL

## 2018-08-03 ENCOUNTER — APPOINTMENT (EMERGENCY)
Dept: RADIOLOGY | Facility: HOSPITAL | Age: 43
End: 2018-08-03
Payer: COMMERCIAL

## 2018-08-03 VITALS
TEMPERATURE: 98 F | BODY MASS INDEX: 23.04 KG/M2 | WEIGHT: 130 LBS | SYSTOLIC BLOOD PRESSURE: 128 MMHG | RESPIRATION RATE: 24 BRPM | OXYGEN SATURATION: 100 % | HEART RATE: 69 BPM | DIASTOLIC BLOOD PRESSURE: 60 MMHG | HEIGHT: 63 IN

## 2018-08-03 VITALS — WEIGHT: 130 LBS | HEIGHT: 63 IN | BODY MASS INDEX: 23.04 KG/M2

## 2018-08-03 DIAGNOSIS — G93.2 PSEUDOTUMOR CEREBRI: ICD-10-CM

## 2018-08-03 DIAGNOSIS — T85.730D INFECTION OF VP (VENTRICULOPERITONEAL) SHUNT, SUBSEQUENT ENCOUNTER: ICD-10-CM

## 2018-08-03 DIAGNOSIS — G93.2 IDIOPATHIC INTRACRANIAL HYPERTENSION: ICD-10-CM

## 2018-08-03 DIAGNOSIS — G93.2 PSEUDOTUMOR CEREBRI: Primary | ICD-10-CM

## 2018-08-03 DIAGNOSIS — K25.5 GASTRIC PERFORATION (HCC): ICD-10-CM

## 2018-08-03 DIAGNOSIS — R51.9 HEADACHE: Primary | ICD-10-CM

## 2018-08-03 DIAGNOSIS — Z98.2 S/P VP SHUNT: ICD-10-CM

## 2018-08-03 PROCEDURE — 70450 CT HEAD/BRAIN W/O DYE: CPT

## 2018-08-03 PROCEDURE — 99284 EMERGENCY DEPT VISIT MOD MDM: CPT

## 2018-08-03 PROCEDURE — 99213 OFFICE O/P EST LOW 20 MIN: CPT | Performed by: PHYSICIAN ASSISTANT

## 2018-08-03 NOTE — ASSESSMENT & PLAN NOTE
Needs emergent evaluation with sudden onset of symptoms  Patient had  shunt removed 2/5/18 secondary to sepsis  Will need CT of head  Will need LP with opening pressure with twilight sedation  Depending on OP will likely need to start Diamox or another agent to decrease ICP    Needs to see opthalmology

## 2018-08-03 NOTE — ED NOTES
Report taken from neuro PA-C: patient with h/o pseudotumor cerebri with 2 prior shunts  Most recent shunt DC'd in February due to sepsis  Concern for ICP today       Renee Hernandez RN  08/03/18 [id number]

## 2018-08-03 NOTE — ED NOTES
Pt reports she is waiting for Dr Chavez Espinal to provide her information on an appointment for follow-up       Edward Baron RN  08/03/18 [id number]

## 2018-08-03 NOTE — DISCHARGE INSTRUCTIONS
Follow up with Dr Ty Pena in 5-7 days  If you begin to have fevers, vomiting, or if there are ANY concerns, return to the ED immediately  General Headache   WHAT YOU NEED TO KNOW:   Headache pain may be mild or severe  Common causes include stress, medicines, and head injuries  Sleep problems, allergies, and hormone changes can also cause a headache  You may have frequent headaches that have no clear cause  Pain may start in another part of your body and move to your head  Headache pain can also move to other parts of your body  A headache can cause other symptoms, such as nausea and vomiting  A severe headache may be a sign of a stroke or other serious problem that needs immediate treatment  DISCHARGE INSTRUCTIONS:   Call 911 for any of the following:   · You have any of the following signs of a stroke:      ¨ Numbness or drooping on one side of your face     ¨ Weakness in an arm or leg    ¨ Confusion or difficulty speaking    ¨ Dizziness, a severe headache, or vision loss    Return to the emergency department if:   · You have a headache with neck stiffness and a fever  · You have a constant headache and are vomiting  · You have severe pain that does not get better after you take pain medicine  · You have a headache and the pain worsens when you look into light  · You have a headache and vision changes, such as blurred vision  · You have a headache and are forgetful or confused  Contact your healthcare provider if:   · You have a headache each day that does not get better, even after treatment  · You have changes in your headaches, or new symptoms that occur when you have a headache  · Others you live or work with also have headaches  · You have questions or concerns about your condition or care  Medicines: You may need any of the following:  · Medicines  may be given to prevent or treat headache pain  Do not wait until the pain is severe to take your medicine   Ask your healthcare provider how to take the medicine safely  · NSAIDs , such as ibuprofen, help decrease swelling, pain, and fever  This medicine is available with or without a doctor's order  NSAIDs can cause stomach bleeding or kidney problems in certain people  If you take blood thinner medicine, always ask if NSAIDs are safe for you  Always read the medicine label and follow directions  Do not give these medicines to children under 10months of age without direction from your child's healthcare provider  · Acetaminophen  decreases pain and fever  It is available without a doctor's order  Ask how much to take and how often to take it  Follow directions  Read the labels of all other medicines you are using to see if they also contain acetaminophen, or ask your doctor or pharmacist  Acetaminophen can cause liver damage if not taken correctly  Do not use more than 4 grams (4,000 milligrams) total of acetaminophen in one day  · Antinausea medicine  may be given to calm your stomach and help prevent vomiting  · Take your medicine as directed  Contact your healthcare provider if you think your medicine is not helping or if you have side effects  Tell him of her if you are allergic to any medicine  Keep a list of the medicines, vitamins, and herbs you take  Include the amounts, and when and why you take them  Bring the list or the pill bottles to follow-up visits  Carry your medicine list with you in case of an emergency  Manage your symptoms:   · Rest in a dark and quiet room  This may help decrease your pain  · Apply heat or ice as directed  Heat or ice may help decrease pain or muscle spasms  Apply heat or ice on the area for 20 minutes every 2 hours for as many days as directed  Your healthcare provider may recommend that you alternate heat and ice  · Relax your muscles to help relieve a headache  Lie down in a comfortable position and close your eyes  Relax your muscles slowly   Start at your toes and work your way up your body  A massage or warm bath may also help relax your muscles  Keep a headache record:  Record the dates and times that you get headaches, and what you were doing before the headache started  Also record what you ate and drank in the 24 hours before the headache started  This might help your healthcare provider find the cause of your headaches and make a treatment plan  The record can also help you avoid headache triggers or manage your symptoms  Get enough sleep:  You should get 8 to 10 hours of sleep each night  Create a sleep schedule  Go to bed and wake up at the same times each day  It may be helpful to do something relaxing before bed  Do not watch television right before bed  Do not smoke:  Nicotine and other chemicals in cigarettes and cigars can trigger a headache or make it worse  Ask your healthcare provider for information if you currently smoke and need help to quit  E-cigarettes or smokeless tobacco still contain nicotine  Talk to your healthcare provider before you use these products  Drink liquids as directed: You may need to drink more liquid to prevent dehydration  Dehydration can cause a headache  Ask your healthcare provider how much liquid to drink each day and which liquids are best for you  Limit caffeine and alcohol as directed: Your headaches may be triggered by caffeine or alcohol  You may also develop a headache if you drink caffeine regularly and suddenly stop  Eat a variety of healthy foods:  Do not skip meals  Too little food can trigger a headache  Include fruits, vegetables, whole-grain breads, low-fat dairy products, beans, lean meat, and fish  Do not have trigger foods, such as chocolate and red wine  Foods that contain gluten, nitrates, MSG, or artificial sweeteners may also trigger a headache  Follow up with your healthcare provider as directed:  Write down your questions so you remember to ask them during your visits     © 2017 [employer] Information is for End User's use only and may not be sold, redistributed or otherwise used for commercial purposes  All illustrations and images included in CareNotes® are the copyrighted property of [employer]  or [employer]  The above information is an  only  It is not intended as medical advice for individual conditions or treatments  Talk to your doctor, nurse or pharmacist before following any medical regimen to see if it is safe and effective for you

## 2018-08-03 NOTE — ED ATTENDING ATTESTATION
Nella Johnson MD, saw and evaluated the patient  I have discussed the patient with the resident/non-physician practitioner and agree with the resident's/non-physician practitioner's findings, Plan of Care, and MDM as documented in the resident's/non-physician practitioner's note, except where noted  All available labs and Radiology studies were reviewed  At this point I agree with the current assessment done in the Emergency Department  I have conducted an independent evaluation of this patient a history and physical is as follows:  Pt ha for 2 weeks  History of pseudo tumor cerebri in past   No fever no neck pain no focal co no visual symptoms  Pt had  shunt in past but was removed secondary to infection greater than 6 months ago  Pt called neurology who sent her to ed for ct and therapeutic tap PE; alert nad heart reg lungs clear abd soft nontneder neck supple neuro nonfocal  MDM: will do ct Pt is requesting that tap be done under anesthesia  Pt is adamantly refusing tap without sedation   We consulted IR for performance of therapeutic tap After pt was seen by IR we discussed with neurology who will schedule for outpt     Critical Care Time  CritCare Time    Procedures

## 2018-08-04 NOTE — ED PROVIDER NOTES
History  Chief Complaint   Patient presents with    Medical Problem     Pt sent in for CT scan by Dr Doss  Pt states was here from Jan-April for sepsis and had Shunt removed  Pt now having pressure in head  Pt with hx  PTC     54-year-old female with history of pseudotumor cerebri comes to the emergency department for evaluation of headache for the past 2 weeks  Patient states that she was seen at Dr Herminio Narvaez office earlier today for these complaints and was told to come to the emergency department  Patient is underthe impression that she would be under moderate sedation when she gets a lumbar puncture performed  Patient states that they have done this in the past at [place]  Patient describes her headache as similar to her headache in the past consistent with her pseudotumor cerebri  She denies blurry vision  Denies headache being sudden in onset  Denies any fever denies neck pain, numbness or tingling  She denies any chest pain, shortness of breath, nausea, vomiting, abdominal pain, dysuria, constipation diarrhea  Of note, patient does appear anxious and is refusing to have a lumbar puncture performed if it is not without moderate sedation  She states that in the past she has had LPs done and they were extremely painful even though they are on their local anesthetic  Prior to Admission Medications   Prescriptions Last Dose Informant Patient Reported? Taking?    Biotin 1 MG CAPS  Self Yes Yes   Sig: as directed   Calcium 500-100 MG-UNIT CHEW  Self Yes Yes   Sig: Chew   Cholecalciferol (VITAMIN D3) 2000 units capsule  Self Yes Yes   Sig: Take 2 capsules by mouth daily   Cyanocobalamin (VITAMIN B-12) 1000 MCG/15ML LIQD  Self Yes Yes   Sig: Take 1 tablet by mouth daily   FIBER SELECT GUMMIES PO  Self Yes Yes   Sig: Take by mouth   Iron-Vitamins (GERITOL) LIQD  Self No Yes   Sig: 15 mL by Per Nasojejunal Tube route daily   Magnesium Oxide 400 MG CAPS  Self Yes Yes   Sig: Reported on 4/3/2017   Multiple Vitamins-Minerals (MULTIVITAMIN WITH IRON-MINERALS) liquid  Self No Yes   Sig: 15 mL by Per J Tube route daily   PANTOPRAZOLE SODIUM PO   Yes Yes   Sig: Take by mouth   Riboflavin 400 MG TABS  Self Yes Yes   Sig: TAKE 1 CAPSULE BY MOUTH DAILY FOR 30 DAYS   acetaminophen (TYLENOL) 160 mg/5 mL suspension  Self No Yes   Sig: Take 20 3 mL (650 mg total) by mouth every 6 (six) hours as needed for mild pain Administered via nasoenteric feeding tube  polyethylene glycol (MIRALAX) 17 g packet  Self No Yes   Sig: Take 17 g by mouth daily Via keofed tube  Facility-Administered Medications: None       Past Medical History:   Diagnosis Date    Abdominal pain     Brain condition     Pseudotumor Cerebri     Esophageal perforation     Gastric leak     GERD (gastroesophageal reflux disease)     Migraine     Obesity     Papilledema, both eyes     Postgastrectomy malabsorption     Presence of lumboperitoneal shunt     Resolved: Sep 20, 2017    Rotator cuff tendinitis     Resolved: Aug 23, 2017    Visual field defect        Past Surgical History:   Procedure Laterality Date    CSF SHUNT      LP shunt - 2015 -  shunt - 2017    ESOPHAGOGASTRODUODENOSCOPY N/A 2/23/2018    Procedure: ESOPHAGOGASTRODUODENOSCOPY (EGD) WITH ESOPHAGEAL STENT PLACEMENT;  Surgeon: Angela Richter MD;  [facility];  Service: Thoracic    ESOPHAGOGASTRODUODENOSCOPY N/A 2/23/2018    Procedure: ESOPHAGOGASTRODUODENOSCOPY (EGD) WITH REMOVAL ESOPHAGEAL STENT  AND REPLACEMENT WITH 23mm X155mm 1111 [address] [place] [address],[address];  Surgeon: Angela Richter MD;  [facility];  Service: Thoracic    ESOPHAGOGASTRODUODENOSCOPY N/A 3/28/2018    Procedure: ESOPHAGOGASTRODUODENOSCOPY (EGD) with PEJ placement ;  Surgeon: Sheryle Congress, MD;  [facility];   Service: Gastroenterology    ESOPHAGOGASTRODUODENOSCOPY N/A 4/5/2018    Procedure: ESOPHAGOGASTRODUODENOSCOPY (EGD) with botox injection and kaofed placement; Surgeon: Abena Hankins DO;  [facility]; Service: Gastroenterology    ESOPHAGOGASTRODUODENOSCOPY N/A 4/10/2018    Procedure: ESOPHAGOGASTRODUODENOSCOPY (EGD) with Kaofed placement;  Surgeon: Moncho Brown MD;  [facility]; Service: Gastroenterology    ESOPHAGOSCOPY WITH STENT INSERTION N/A 1/24/2018    Procedure: INSERTION STENT ESOPHAGEAL;  Surgeon: Ritchie Davila MD;  [facility]; Service: Gastroenterology    EYE SURGERY      for "crossed eyed" (in 2nd grade)     GASTRIC BYPASS  2016    HERNIA REPAIR      HYSTERECTOMY      LAPAROTOMY N/A 1/25/2018    Procedure: Exploratory Laparotomy, wash out,placement of drains, placement of NG feeding tube ; Surgeon: Shameka Owen DO;  [facility];  Service: General    IL CREATE SHUNT:VENTRIC-PERITONEAL Right 5/31/2017    Procedure: IMAGE GUIDED CORONAL PLACEMENT OF PROGRAMABLE VENTRICULAR-PERITONEAL SHUNT, REMOVAL OF LP SHUNT ;  Surgeon: Devin Kelley MD;  [facility];  Service: Neurosurgery    IL EGD TRANSORAL BIOPSY SINGLE/MULTIPLE N/A 6/27/2018    Procedure: ESOPHAGOGASTRODUODENOSCOPY (EGD) with padlock clip placement;  Surgeon: Cathy Santos MD;  [facility];   Service: Nicholas Manuelito CSF SHUNT,W/O REPLACE Right 2/5/2018    Procedure: Removal of  shunt;  Surgeon: Devin Kelley MD;  [facility];  Service: Neurosurgery    IL REPLACEMENT/REVISION,CSF SHUNT Right 1/25/2018    Procedure: Externalization of right-sided SHUNT VENTRICULAR-PERITONEAL in anterior chest wall ribs two and three level  ;  Surgeon: Idania Mendoza MD;  [facility];  Service: Neurosurgery       Family History   Problem Relation Age of Onset    No Known Problems Mother     No Known Problems Father     Thyroid cancer Brother     Colon cancer Maternal Grandfather     Cancer Paternal Grandmother     Cancer Paternal Grandfather      I have reviewed and agree with the history as documented  Social History   Substance Use Topics    Smoking status: Former Smoker     Packs/day: 0 50     Years: 20 00     Quit date: 2012    Smokeless tobacco: Never Used    Alcohol use No        Review of Systems   Constitutional: Negative for appetite change, chills, diaphoresis, fatigue and fever  HENT: Negative for congestion, ear discharge, ear pain, hearing loss, postnasal drip, rhinorrhea, sneezing and sore throat  Eyes: Negative for pain, discharge and redness  Respiratory: Negative for choking, chest tightness, shortness of breath, wheezing and stridor  Cardiovascular: Negative for chest pain and palpitations  Gastrointestinal: Negative for abdominal distention, abdominal pain, blood in stool, constipation, diarrhea, nausea and vomiting  Genitourinary: Negative for decreased urine volume, difficulty urinating, dysuria, flank pain, frequency and hematuria  Musculoskeletal: Negative for arthralgias, gait problem, joint swelling and neck pain  Skin: Negative for color change, pallor and rash  Allergic/Immunologic: Negative for environmental allergies, food allergies and immunocompromised state  Neurological: Positive for headaches  Negative for dizziness, seizures, speech difficulty, weakness, light-headedness and numbness  Hematological: Negative for adenopathy  Does not bruise/bleed easily  Psychiatric/Behavioral: Negative for agitation and behavioral problems         Physical Exam  ED Triage Vitals [08/03/18 1202]   Temperature Pulse Respirations Blood Pressure SpO2   98 °F (36 7 °C) 59 20 132/79 100 %      Temp Source Heart Rate Source Patient Position - Orthostatic VS BP Location FiO2 (%)   Tympanic Monitor Sitting Left arm --      Pain Score       4           Orthostatic Vital Signs  Vitals:    08/03/18 1400 08/03/18 1449 08/03/18 1500 08/03/18 1530   BP:  120/73 133/62 128/60   Pulse: 63 64 68 69   Patient Position - Orthostatic VS:           Physical Exam Constitutional: She is oriented to person, place, and time  She appears well-developed and well-nourished  HENT:   Head: Normocephalic and atraumatic  Nose: Nose normal    Mouth/Throat: Oropharynx is clear and moist    Eyes: Conjunctivae and EOM are normal  Pupils are equal, round, and reactive to light  Neck: Normal range of motion  Neck supple  Cardiovascular: Normal rate, regular rhythm and normal heart sounds  Exam reveals no gallop and no friction rub  No murmur heard  Pulmonary/Chest: Effort normal and breath sounds normal    Abdominal: Soft  Bowel sounds are normal  She exhibits no distension  There is no tenderness  There is no rebound and no guarding  Musculoskeletal: Normal range of motion  Neurological: She is alert and oriented to person, place, and time  She has normal strength and normal reflexes  No cranial nerve deficit or sensory deficit  She displays a negative Romberg sign  Skin: Skin is warm and dry  No erythema  No pallor  Psychiatric: She has a normal mood and affect  Her behavior is normal    Nursing note and vitals reviewed  ED Medications  Medications - No data to display    Diagnostic Studies  Results Reviewed     None                 CT head without contrast   Final Result by Jake Andrew MD (08/03 1320)      No acute intracranial abnormality  Workstation performed: [id number]         IR consult    (Results Pending)         Procedures  Procedures      Phone Consults  ED Phone Contact    ED Course  ED Course as of Aug 04 1950   Fri Aug 03, 2018   [id number] Paging radiology for lp under sedation    [phone] IR nurse joey spoke with patient explaining that Dr Kelsi Babin (Interventional radiologist) would not do LP under anesthesia  Patient refusing LP under local anesthesia and states that she will follow up with Dr Danilo Talavera to get this scheduled as an outpatient                                  MDM  Number of Diagnoses or Management Options  Headache: Pseudotumor cerebri:   Diagnosis management comments: 65-year-old female presents emergent department for evaluation of headache that she has had for 2 weeks  MDM:  I have spoken with interventional radiology who is refusing to perform LP under sedation  I have spoken with neurologist Dr Paul Kramer who came down to the patient room at bedside and explained to her the risk and benefits of LP under conscious sedation  Patient is adamant and that she does not want to have the LP performed if she is not getting sedation  Patient told the neurologist that she she was set up an appointment as an outpatient for possible lumbar puncture  Patient to be discharged appropriate return precautions  CritCare Time    Disposition  Final diagnoses:   Headache   Pseudotumor cerebri     Time reflects when diagnosis was documented in both MDM as applicable and the Disposition within this note     Time User Action Codes Description Comment    8/3/2018  3:10 PM Ania Salguero Add [R51] Headache     8/3/2018  3:10 PM Ania Salguero Add [G93 2] Pseudotumor cerebri       ED Disposition     ED Disposition Condition Comment    Discharge  Shweta Nolan discharge to home/self care  Condition at discharge: Stable        Follow-up Information     Follow up With Specialties Details Why Contact Info    Jaelyn Ramírez DO Family Medicine, Internal Medicine In 1 day  [facility]      Eve Sims MD Neurology In 1 week  [address]  [phone]            Discharge Medication List as of 8/3/2018  3:11 PM      CONTINUE these medications which have NOT CHANGED    Details   acetaminophen (TYLENOL) 160 mg/5 mL suspension Take 20 3 mL (650 mg total) by mouth every 6 (six) hours as needed for mild pain Administered via nasoenteric feeding tube  , Starting Mon 4/30/2018, No Print      Biotin 1 MG CAPS as directed, Historical Med      Calcium 500-100 MG-UNIT CHEW Chew, Starting Thu 3/30/2017, Historical Med      Cholecalciferol (VITAMIN D3) 2000 units capsule Take 2 capsules by mouth daily, Starting Thu 3/30/2017, Historical Med      Cyanocobalamin (VITAMIN B-12) 1000 MCG/15ML LIQD Take 1 tablet by mouth daily, Starting Thu 3/30/2017, Historical Med      FIBER SELECT GUMMIES PO Take by mouth, Starting Th 3/30/2017, Historical Med      Iron-Vitamins (GERITOL) LIQD 15 mL by Per Nasojejunal Tube route daily, Starting Th 6/28/2018, Print      Magnesium Oxide 400 MG CAPS Reported on 4/3/2017, Historical Med      Multiple Vitamins-Minerals (MULTIVITAMIN WITH IRON-MINERALS) liquid 15 mL by Per J Tube route daily, Starting Thu 6/28/2018, Print      PANTOPRAZOLE SODIUM PO Take by mouth, Historical Med      polyethylene glycol (MIRALAX) 17 g packet Take 17 g by mouth daily Via keofed tube  , Starting Mon 4/30/2018, No Print      Riboflavin 400 MG TABS TAKE 1 CAPSULE BY MOUTH DAILY FOR 30 DAYS, Historical Med             Outpatient Discharge Orders  IR consult   Standing Status: Future  Standing Exp  Date: 09/03/18         ED Provider  Attending physically available and evaluated Zainab Russell  CANDE managed the patient along with the ED Attending      Electronically Signed by         Anthony Haji MD  08/04/18 2002

## 2018-08-06 ENCOUNTER — TRANSCRIBE ORDERS (OUTPATIENT)
Dept: NEUROLOGY | Facility: CLINIC | Age: 43
End: 2018-08-06

## 2018-08-06 DIAGNOSIS — Z98.2 PRESENCE OF CEREBROSPINAL FLUID DRAINAGE DEVICE: ICD-10-CM

## 2018-08-06 DIAGNOSIS — G93.2 BENIGN INTRACRANIAL HYPERTENSION: Primary | ICD-10-CM

## 2018-08-06 DIAGNOSIS — R41.89 OTHER SYMPTOMS AND SIGNS INVOLVING COGNITIVE FUNCTIONS AND AWARENESS: ICD-10-CM

## 2018-08-07 ENCOUNTER — PREP FOR PROCEDURE (OUTPATIENT)
Dept: NEUROLOGY | Facility: CLINIC | Age: 43
End: 2018-08-07

## 2018-08-07 ENCOUNTER — TELEPHONE (OUTPATIENT)
Dept: NEUROLOGY | Facility: CLINIC | Age: 43
End: 2018-08-07

## 2018-08-07 DIAGNOSIS — G93.2 PSEUDOTUMOR CEREBRI: Primary | ICD-10-CM

## 2018-08-07 NOTE — TELEPHONE ENCOUNTER
Orders entered  Need LP with opening and closing pressure and only 2ml of fluid to be saved    All of this was entered in orderset :)

## 2018-08-07 NOTE — TELEPHONE ENCOUNTER
Patient states she went to the ER after her office visit here on 8/3  She was supposed to get LP but refused because she wanted sedation  She stated she was told by Dr Jayla Townsend that she would be set up on Wednesday with "twilight sedation" but was concerned because she has not heard anything regarding this  Patient states she refuses to have LP without any sedation  [facility] does do LP with sedation, we need LP order with sedation entered

## 2018-08-08 ENCOUNTER — TELEPHONE (OUTPATIENT)
Dept: NEUROSURGERY | Facility: CLINIC | Age: 43
End: 2018-08-08

## 2018-08-08 NOTE — TELEPHONE ENCOUNTER
Tried to sched pt however our facilities do not accomdate twilight sedation  Checked with IR  IF pt calls, pt is to be routed to [facility]  Pt MUST be seen by Neurosurgery  Aldair Santos will speak with her to explain her steps     Thank you

## 2018-08-08 NOTE — TELEPHONE ENCOUNTER
Patient called the office and was transferred to my line because she was scheduled for an LP for tomorrow and was cancelled because they would not do this with twilight sedation  When she was seen last year with [facility] her LP was done at [address] and this was not properly completed so he advised her to have LP through [address] in the future  She reports that she received a call yesterday stating that the LP could not be done with twilight sedation and she was not happy about this  I explained that I'm an MA in the office and do not work directly with the hospital to know what their protocol is for LPs  I placed her on hold and tried to figure this out  I called Radiology Nurse at [phone] and she explained that they no longer have any doctors who will do the LP with sedation  I told the radiology nurse that I guess I could try to speak with Dr Sheldon Soares and she said that maybe he would be willing to do it that way  I reviewed with Dr Milan Teran who stated that he did not see a problem with Dr Sheldon Soares doing this  I came back on the phone with patient and explained the situation and that I would try my best to sepak with Dr Sheldon Soares ASAP to try to help her as much as I can but I did not promise a call back today because he is in a procedure and I'm unaware of time he will be done  I did advised her that I will give her a call tomorrow to give her an update even if I don't have an answer yet to at least let her know that we are still working on this  I will forward a message to Dr Sheldon Soares as well to ask him

## 2018-08-08 NOTE — TELEPHONE ENCOUNTER
Spoke with patient regarding her LP and discussed that our office was told that IR cannot perform with twilight sedation  Patient was very upset about this  She is going to call Neurosurgery to speak with them  Patient states she does have a lot of pressure and headache    She will call back

## 2018-08-09 NOTE — TELEPHONE ENCOUNTER
Called patient and stated that I spoke to Dr Chidi Wylie regarding her LP and that he agreed to the LP with sedation  I advised her that regarding the order I was not sure if she could possibly call Dr Prince Swanson office (Neurology) and see if they can place an order stating that Dr Chidi Wylie would be the performing provider and that way she could just get it scheduled  She stated that she would call their office to verify  I also advised her that I would be out of the office Fri 8/10/18 and Mon 8/13/18 but that I had a message out to Leda Garcia and Martinez Jacqueline B in case they could assist in figuring out how we can best help the patient  She verbalized understanding and was very appreciative

## 2018-08-09 NOTE — ANESTHESIA PREPROCEDURE EVALUATION
Coming back to OR s/p esophageal stent placement this AM by Mulugetafeind  Grade 1 view, intubated w/ MAC 3 7 0 ETT  TL PICC in place    Review of Systems/Medical History  Patient summary reviewed  Chart reviewed  No history of anesthetic complications     Cardiovascular    Comment: ECHO 1/ 2018--EF 80%, normal systolic fxn, no reg abnml,  Pulmonary  Smoker ex-smoker  ,        GI/Hepatic    GERD ,   Comment: Hx laparoscopic paraesophageal hernia repair, gastric injury during surgery--s/p esophageal stent this AM; Sepsis req multiple surgical procedures     Negative  ROS        Endo/Other    Comment: Right eye choroidal nevus    GYN  Negative gynecology ROS          Hematology  Negative hematology ROS      Musculoskeletal  Negative musculoskeletal ROS        Neurology      Comment: Idiopathic intracranial hypertension--s/p  shunt 5/30/2017--in light of current sepsis, shunt removed 2/5/18 Psychology   Negative psychology ROS              Physical Exam    Airway    Mallampati score: II  TM Distance: >3 FB  Neck ROM: full     Dental   No notable dental hx     Cardiovascular  Rhythm: regular,     Pulmonary  Breath sounds clear to auscultation,     Other Findings        Anesthesia Plan  ASA Score- 3     Anesthesia Type- general with ASA Monitors  Additional Monitors:   Airway Plan: ETT  Comment: H/H 9/28, K 3 9, Cr  3  Plan Factors-Patient not instructed to abstain from smoking on day of procedure       Induction- intravenous  Postoperative Plan- Plan for postoperative opioid use  Planned trial extubation    Informed Consent- Anesthetic plan and risks discussed with patient  I personally reviewed this patient with the CRNA  Discussed and agreed on the Anesthesia Plan with the CRNA  Akhil De La Garza [facility] PHYSICIANS  KIRTI HUMPHRIES [facility]  [address] [address] [address] [facility] [place] [address]  Dept: [phone]    8/9/2018    CHIEF COMPLAINT    Chief Complaint   Patient presents with    Follow-Up from [facility] Gastroesophageal Reflux       HPI    Crys Hand is a 75 y.o. female who presents   Chief Complaint   Patient presents with    Follow-Up from Hospital    Gastroesophageal Reflux   . Recheck after hospital stay for epigastric and chest pain. Cardiac work up was negative. Taking ppi daily 40mg but doesn't feel it is controlling sx. Liver enzymes were elevated in the hospital.   Taking tylenol and norco daily. Not taking any nsaids. Gastroesophageal Reflux   She complains of abdominal pain, belching and heartburn. She reports no chest pain, no coughing or no nausea. This is a chronic problem. The problem occurs frequently. The symptoms are aggravated by certain foods. Pertinent negatives include no weight loss. Risk factors include caffeine use. She has tried a PPI for the symptoms. The treatment provided mild relief. Past procedures include an EGD. REVIEW OF SYSTEMS    Review of Systems   Constitutional: Positive for malaise/fatigue. Negative for fever and weight loss. HENT: Negative. Eyes: Negative. Respiratory: Negative for cough and shortness of breath. Cardiovascular: Negative for chest pain, palpitations and leg swelling. Gastrointestinal: Positive for abdominal pain and heartburn. Negative for blood in stool, nausea and vomiting. Genitourinary: Negative for frequency and urgency. Musculoskeletal: Positive for joint pain (chronic). Skin: Negative. Neurological: Negative for dizziness, sensory change and headaches. Endo/Heme/Allergies: Negative. Psychiatric/Behavioral: Negative for depression. The patient is not nervous/anxious and does not have insomnia.         PAST MEDICAL HISTORY    Past Medical History: rebound and no guarding. Musculoskeletal: Normal range of motion. She exhibits deformity (rt ankle). She exhibits no edema or tenderness. OA changes   Lymphadenopathy:     She has no cervical adenopathy. Neurological: She is alert and oriented to person, place, and time. Using walker to ambulate   Skin: Skin is warm and dry. Psychiatric: She has a normal mood and affect. Her behavior is normal.   Vitals reviewed. Assessment/PLan  1. Gastroesophageal reflux disease without esophagitis  Chronic, not well controlled. - omeprazole (PRILOSEC OTC) 20 MG tablet; Take 1 tablet by mouth 2 times daily  Dispense: 60 tablet; Refill: 3    2. Elevated liver enzymes  Acute. Discussed possible causes of elevated liver enzymes. She will reduce tylenol.   - Hepatic Function Panel; Future    3. Essential hypertension  Chronic, stable, continue current medication and/or plan      4. Mixed hyperlipidemia  Chronic, stable, continue current medication and/or plan      5. Primary osteoarthritis involving multiple joints  Chronic, stable, continue current medication and/or plan    - diclofenac sodium (VOLTAREN) 1 % GEL; Apply 4 g topically every 4 hours as needed for Pain  Dispense: 1 Tube; Refill: 5      Ally received counseling on the following healthy behaviors: nutrition and medication adherence  Reviewed prior labs and health maintenance. Continue current medications, diet and exercise. Discussed use, benefit, and side effects of prescribed medications. Barriers to medication compliance addressed. Patient given educational materials - see patient instructions. All patient questions answered. Patient voiced understanding. Return in about 2 months (around 10/9/2018) for htn.         Electronically signed by Jeanne Hawthorne MD on 8/9/18 at 3:06 PM

## 2018-08-09 NOTE — TELEPHONE ENCOUNTER
Dr Ortiz Padron responded to me and stated that he is willing to do the LP with sedation and to have our Surgery Scheduler arrange through IR

## 2018-08-10 ENCOUNTER — PREP FOR PROCEDURE (OUTPATIENT)
Dept: NEUROLOGY | Facility: CLINIC | Age: 43
End: 2018-08-10

## 2018-08-10 DIAGNOSIS — G93.2 PSEUDOTUMOR CEREBRI: Primary | ICD-10-CM

## 2018-08-10 NOTE — TELEPHONE ENCOUNTER
Patient received call from NS yesterday  Please see NS encounter from 8/8/18  Patient states she needs a new order for LP

## 2018-08-14 ENCOUNTER — OFFICE VISIT (OUTPATIENT)
Dept: CARDIAC SURGERY | Facility: CLINIC | Age: 43
End: 2018-08-14
Payer: COMMERCIAL

## 2018-08-14 VITALS
DIASTOLIC BLOOD PRESSURE: 64 MMHG | OXYGEN SATURATION: 99 % | WEIGHT: 131.6 LBS | SYSTOLIC BLOOD PRESSURE: 123 MMHG | BODY MASS INDEX: 23.32 KG/M2 | TEMPERATURE: 96.9 F | HEART RATE: 56 BPM | HEIGHT: 63 IN

## 2018-08-14 DIAGNOSIS — K25.5 GASTRIC PERFORATION (HCC): Primary | ICD-10-CM

## 2018-08-14 PROCEDURE — 99211 OFF/OP EST MAY X REQ PHY/QHP: CPT | Performed by: THORACIC SURGERY (CARDIOTHORACIC VASCULAR SURGERY)

## 2018-08-14 NOTE — ASSESSMENT & PLAN NOTE
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

## 2018-08-14 NOTE — PROGRESS NOTES
Thoracic Follow-Up  Assessment/Plan:    Gastric perforation ([address] )  Ms Carlyon Lanes appears to have completely healed her proximal gastric perforation and fistula  She is tolerating an oral diet and maintaining her weight  Given the length of her illness she will still probably require several more months of increasing her activity level before she feels totally normal again  I would be happy to see her back on an as needed basis  She knows that she can call my office at any time with any questions or concerns  Diagnoses and all orders for this visit:    Gastric perforation [facility])          Thoracic History    Diagnosis:  Proximal gastric perforation         Subjective:    Patient ID: Igor Velasquez is a 36 y o  female  Ms Carlyon Lanes returns to the office today for routine follow-up of her gastric perforation  She successfully had her gastric perforation closed endoscopically in over time was able to have her catheter her fistula removed  She has been tolerating an oral diet without significant dysphagia, nausea, or vomiting  She even ate steak the other night without difficulty  She has been maintaining her weight  She has no drainage to her abdomen  She has had recurrent symptoms related to her interest cerebral pressures and she is going to have a lumbar puncture in the next week or 2  She denies fever or chills  The following portions of the patient's history were reviewed and updated as appropriate: allergies, current medications, past family history, past medical history, past social history, past surgical history and problem list     Review of Systems      Objective:   Physical Exam   Abdominal: Soft  Bowel sounds are normal  She exhibits no distension  There is no tenderness     Well-healed incisions   /64 (BP Location: Left arm, Patient Position: Sitting, Cuff Size: Adult)   Pulse 56   Temp (!) 96 9 °F (36 1 °C)   Ht 5' 3" (1 6 m)   Wt 59 7 kg (131 lb 9 6 oz)   LMP  (LMP Unknown) SpO2 99%   BMI 23 31 kg/m²

## 2018-08-23 ENCOUNTER — TRANSCRIBE ORDERS (OUTPATIENT)
Dept: ADMINISTRATIVE | Facility: HOSPITAL | Age: 43
End: 2018-08-23

## 2018-08-23 DIAGNOSIS — G93.2 PSEUDOTUMOR CEREBRI: Primary | ICD-10-CM

## 2018-08-24 ENCOUNTER — CONSULT (OUTPATIENT)
Dept: INTERNAL MEDICINE CLINIC | Facility: CLINIC | Age: 43
End: 2018-08-24
Payer: COMMERCIAL

## 2018-08-24 VITALS
OXYGEN SATURATION: 99 % | SYSTOLIC BLOOD PRESSURE: 120 MMHG | HEIGHT: 63 IN | DIASTOLIC BLOOD PRESSURE: 80 MMHG | HEART RATE: 58 BPM | WEIGHT: 132 LBS | TEMPERATURE: 98.5 F | BODY MASS INDEX: 23.39 KG/M2

## 2018-08-24 DIAGNOSIS — Z01.818 PRE-OP EXAM: ICD-10-CM

## 2018-08-24 DIAGNOSIS — G93.2 PSEUDOTUMOR CEREBRI: Primary | ICD-10-CM

## 2018-08-24 PROCEDURE — 36415 COLL VENOUS BLD VENIPUNCTURE: CPT | Performed by: NURSE PRACTITIONER

## 2018-08-24 PROCEDURE — 99214 OFFICE O/P EST MOD 30 MIN: CPT | Performed by: NURSE PRACTITIONER

## 2018-08-24 RX ORDER — OMEPRAZOLE 40 MG/1
1 CAPSULE, DELAYED RELEASE ORAL 2 TIMES DAILY
COMMUNITY
Start: 2018-08-07 | End: 2019-01-04 | Stop reason: SDUPTHER

## 2018-08-24 NOTE — PROGRESS NOTES
Subjective:    Joan Mcfarlane is a 36y o  year old female who presents to the office today for a preoperative consultation at the request of surgeon Dr Mandy Spann who plans on performing a lumbar puncture on August 29  This consultation is requested for the specific conditions prompting preoperative evaluation (i e  because of potential affect on operative risk)  Planned anesthesia: general  The patient has the following known anesthesia issues: no issues  Patients bleeding risk: no recent abnormal bleeding  Patient does have objections to receiving blood products if needed  Patient is able to walk 4 blocks without symptoms  Was in the hospital for sepsis and she is currently in PT for walking  Patient is able to walk up 2 flights of stairs without symptoms  Significant past medical history includes: sepsis in the beginning of this year    Tobacco use: denies  Alcohol use: denies  Illicit drug use: denies    Symptoms:   Easy bleeding: no  Easy bruising: no  Frequent nose bleeds: no  Chest pain: no  Cough: no  Dyspnea on exertion:no  Edema: no  Palpitations: no  Wheezing: no    Living situation: Patient lives with at home with significant other and son in a two story home  Her significant other will be caring for her after surgery  shedoes not have post-op concerns  The following portions of the patient's history were reviewed and updated as appropriate: allergies, current medications, past family history, past medical history, past social history, past surgical history and problem list     Review of Systems  Review of Systems   Constitutional: Negative for activity change, appetite change, chills, diaphoresis and fever  HENT: Negative for congestion, ear discharge, ear pain, postnasal drip, rhinorrhea, sinus pain, sinus pressure and sore throat  Eyes: Negative for pain, discharge, itching and visual disturbance  Respiratory: Negative for cough, chest tightness, shortness of breath and wheezing  Cardiovascular: Negative for chest pain, palpitations and leg swelling  Gastrointestinal: Negative for abdominal pain, blood in stool, constipation, diarrhea, nausea and vomiting  Endocrine: Negative for polydipsia, polyphagia and polyuria  Genitourinary: Negative for difficulty urinating, dysuria, hematuria and urgency  Musculoskeletal: Negative for arthralgias, back pain and neck pain  Skin: Negative for rash and wound  Neurological: Positive for headaches (pressure)  Negative for dizziness, weakness and numbness  Past Medical History:   Diagnosis Date    Abdominal pain     Brain condition     Pseudotumor Cerebri     Esophageal perforation     Gastric leak     GERD (gastroesophageal reflux disease)     Migraine     Obesity     Papilledema, both eyes     Postgastrectomy malabsorption     Presence of lumboperitoneal shunt     Resolved: Sep 20, 2017    Rotator cuff tendinitis     Resolved: Aug 23, 2017    Visual field defect      Past Surgical History:   Procedure Laterality Date    CSF SHUNT      LP shunt - 2015 -  shunt - 2017    ESOPHAGOGASTRODUODENOSCOPY N/A 2/23/2018    Procedure: ESOPHAGOGASTRODUODENOSCOPY (EGD) WITH ESOPHAGEAL STENT PLACEMENT;  Surgeon: Tatyana Shin MD;  [facility];  Service: Thoracic    ESOPHAGOGASTRODUODENOSCOPY N/A 2/23/2018    Procedure: ESOPHAGOGASTRODUODENOSCOPY (EGD) WITH REMOVAL ESOPHAGEAL STENT  AND REPLACEMENT WITH 23mm X155mm 1111 [address] [place] [address],[address];  Surgeon: Tatyana Shin MD;  [facility];  Service: Thoracic    ESOPHAGOGASTRODUODENOSCOPY N/A 3/28/2018    Procedure: ESOPHAGOGASTRODUODENOSCOPY (EGD) with PEJ placement ;  Surgeon: Rayray Prieto MD;  [facility]; Service: Gastroenterology    ESOPHAGOGASTRODUODENOSCOPY N/A 4/5/2018    Procedure: ESOPHAGOGASTRODUODENOSCOPY (EGD) with botox injection and kaofed placement;  Surgeon: Shai Hastings DO;  [facility];   Service: Gastroenterology   Criss Dillon ESOPHAGOGASTRODUODENOSCOPY N/A 4/10/2018    Procedure: ESOPHAGOGASTRODUODENOSCOPY (EGD) with Kaofed placement;  Surgeon: Melinda Najera MD;  [facility]; Service: Gastroenterology    ESOPHAGOSCOPY WITH STENT INSERTION N/A 1/24/2018    Procedure: INSERTION STENT ESOPHAGEAL;  Surgeon: Matias Cruz MD;  [facility]; Service: Gastroenterology    EYE SURGERY      for "crossed eyed" (in 2nd grade)     GASTRIC BYPASS  2016    HERNIA REPAIR      HYSTERECTOMY      LAPAROTOMY N/A 1/25/2018    Procedure: Exploratory Laparotomy, wash out,placement of drains, placement of NG feeding tube ; Surgeon: Romel Mcdaniels DO;  [facility];  Service: General    IN CREATE SHUNT:VENTRIC-PERITONEAL Right 5/31/2017    Procedure: IMAGE GUIDED CORONAL PLACEMENT OF PROGRAMABLE VENTRICULAR-PERITONEAL SHUNT, REMOVAL OF LP SHUNT ;  Surgeon: Dangelo Mckenna MD;  [facility];  Service: Neurosurgery    IN EGD TRANSORAL BIOPSY SINGLE/MULTIPLE N/A 6/27/2018    Procedure: ESOPHAGOGASTRODUODENOSCOPY (EGD) with padlock clip placement;  Surgeon: Flako Flor MD;  [facility];   Service: Tracey Pancake CSF SHUNT,W/O REPLACE Right 2/5/2018    Procedure: Removal of  shunt;  Surgeon: Dangelo Mckenna MD;  [facility];  Service: Neurosurgery    IN REPLACEMENT/REVISION,CSF SHUNT Right 1/25/2018    Procedure: Externalization of right-sided SHUNT VENTRICULAR-PERITONEAL in anterior chest wall ribs two and three level  ;  Surgeon: Fe Don MD;  [facility];  Service: Neurosurgery     Social History   Substance Use Topics    Smoking status: Former Smoker     Packs/day: 0 50     Years: 20 00     Quit date: 8/24/2012    Smokeless tobacco: Never Used    Alcohol use No     Family History   Problem Relation Age of Onset    No Known Problems Mother     No Known Problems Father     Thyroid cancer Brother     Colon cancer Maternal Grandfather     Cancer Paternal Grandmother     Cancer Paternal Grandfather      Benadryl [diphenhydramine] and Phenergan [promethazine]  Current Outpatient Prescriptions   Medication Sig Dispense Refill    omeprazole (PriLOSEC) 40 MG capsule Take 1 capsule by mouth 2 (two) times a day       No current facility-administered medications for this visit  Objective:       /80 (BP Location: Right arm, Patient Position: Sitting, Cuff Size: Adult)   Pulse 58   Temp 98 5 °F (36 9 °C) (Oral)   Ht 5' 3" (1 6 m)   Wt 59 9 kg (132 lb)   LMP  (LMP Unknown)   SpO2 99%   BMI 23 38 kg/m²   Physical Exam   Constitutional: She is oriented to person, place, and time  She appears well-developed and well-nourished  No distress  HENT:   Head: Normocephalic and atraumatic  Right Ear: External ear normal    Left Ear: External ear normal    Nose: Nose normal    Mouth/Throat: Oropharynx is clear and moist  No oropharyngeal exudate  Eyes: Conjunctivae and EOM are normal  Pupils are equal, round, and reactive to light  Right eye exhibits no discharge  Left eye exhibits no discharge  Neck: Normal range of motion  Neck supple  No thyromegaly present  Cardiovascular: Normal rate, regular rhythm, normal heart sounds and intact distal pulses  Exam reveals no gallop and no friction rub  No murmur heard  Pulmonary/Chest: Effort normal  No stridor  No respiratory distress  She has decreased breath sounds  She has no wheezes  She has no rales  Abdominal: Soft  Bowel sounds are normal  She exhibits no distension  There is tenderness  Lymphadenopathy:     She has no cervical adenopathy  Neurological: She is alert and oriented to person, place, and time  Skin: Skin is warm and dry  No rash noted  She is not diaphoretic  No erythema  Psychiatric: She has a normal mood and affect   Her behavior is normal  Judgment and thought content normal             Cardiographics  ECG: normal sinus rhythm, no blocks or conduction defects, no ischemic changes form EKG on January 31,2018      Lab Review   No visits with results within 2 Month(s) from this visit     Latest known visit with results is:   Admission on 04/25/2018, Discharged on 04/30/2018   Component Date Value    WBC 04/25/2018 10 15     RBC 04/25/2018 4 02     Hemoglobin 04/25/2018 10 5*    Hematocrit 04/25/2018 33 4*    MCV 04/25/2018 83     MCH 04/25/2018 26 1*    MCHC 04/25/2018 31 4     RDW 04/25/2018 15 6*    MPV 04/25/2018 9 5     Platelets 73/98/9475 289     nRBC 04/25/2018 0     Neutrophils Relative 04/25/2018 70     Lymphocytes Relative 04/25/2018 16     Monocytes Relative 04/25/2018 10     Eosinophils Relative 04/25/2018 3     Basophils Relative 04/25/2018 1     Neutrophils Absolute 04/25/2018 7 17     Lymphocytes Absolute 04/25/2018 1 66     Monocytes Absolute 04/25/2018 0 96     Eosinophils Absolute 04/25/2018 0 28     Basophils Absolute 04/25/2018 0 06     Sodium 04/25/2018 136     Potassium 04/25/2018 4 5     Chloride 04/25/2018 101     CO2 04/25/2018 28     Anion Gap 04/25/2018 7     BUN 04/25/2018 14     Creatinine 04/25/2018 0 39*    Glucose 04/25/2018 95     Calcium 04/25/2018 9 0     AST 04/25/2018 40     ALT 04/25/2018 46     Alkaline Phosphatase 04/25/2018 61     Total Protein 04/25/2018 6 8     Albumin 04/25/2018 2 7*    Total Bilirubin 04/25/2018 0 35     eGFR 04/25/2018 129     LACTIC ACID 04/25/2018 0 7     Lipase 04/25/2018 258     Procalcitonin 04/25/2018 <0 05     WBC 04/26/2018 6 07     RBC 04/26/2018 3 57*    Hemoglobin 04/26/2018 9 4*    Hematocrit 04/26/2018 30 2*    MCV 04/26/2018 85     MCH 04/26/2018 26 3*    MCHC 04/26/2018 31 1*    RDW 04/26/2018 15 9*    MPV 04/26/2018 9 6     Platelets 98/03/8544 263     nRBC 04/26/2018 0     Neutrophils Relative 04/26/2018 68     Lymphocytes Relative 04/26/2018 20     Monocytes Relative 04/26/2018 8     Eosinophils Relative 04/26/2018 3     Basophils Relative 04/26/2018 1     Neutrophils Absolute 04/26/2018 4 14     Lymphocytes Absolute 04/26/2018 1 23     Monocytes Absolute 04/26/2018 0 49     Eosinophils Absolute 04/26/2018 0 17     Basophils Absolute 04/26/2018 0 03     Sodium 04/26/2018 136     Potassium 04/26/2018 4 2     Chloride 04/26/2018 103     CO2 04/26/2018 27     Anion Gap 04/26/2018 6     BUN 04/26/2018 12     Creatinine 04/26/2018 0 38*    Glucose 04/26/2018 120     Calcium 04/26/2018 8 3     eGFR 04/26/2018 130     Prealbumin 04/26/2018 18 4     Magnesium 04/26/2018 1 8     WBC 04/28/2018 6 75     RBC 04/28/2018 3 70*    Hemoglobin 04/28/2018 9 7*    Hematocrit 04/28/2018 30 4*    MCV 04/28/2018 82     MCH 04/28/2018 26 2*    MCHC 04/28/2018 31 9     RDW 04/28/2018 15 8*    MPV 04/28/2018 9 3     Platelets 91/08/5130 212     nRBC 04/28/2018 0     Neutrophils Relative 04/28/2018 66     Lymphocytes Relative 04/28/2018 19     Monocytes Relative 04/28/2018 10     Eosinophils Relative 04/28/2018 4     Basophils Relative 04/28/2018 1     Neutrophils Absolute 04/28/2018 4 48     Lymphocytes Absolute 04/28/2018 1 25     Monocytes Absolute 04/28/2018 0 69     Eosinophils Absolute 04/28/2018 0 28     Basophils Absolute 04/28/2018 0 04     Sodium 04/28/2018 136     Potassium 04/28/2018 4 2     Chloride 04/28/2018 99*    CO2 04/28/2018 32     Anion Gap 04/28/2018 5     BUN 04/28/2018 16     Creatinine 04/28/2018 0 45*    Glucose 04/28/2018 114     Calcium 04/28/2018 8 9     eGFR 04/28/2018 123     WBC 04/30/2018 6 12     RBC 04/30/2018 3 66*    Hemoglobin 04/30/2018 9 6*    Hematocrit 04/30/2018 31 1*    MCV 04/30/2018 85     MCH 04/30/2018 26 2*    MCHC 04/30/2018 30 9*    RDW 04/30/2018 16 1*    MPV 04/30/2018 9 7     Platelets 12/30/1674 205     nRBC 04/30/2018 0     Neutrophils Relative 04/30/2018 61     Lymphocytes Relative 04/30/2018 23     Monocytes Relative 04/30/2018 9     Eosinophils Relative 04/30/2018 6     Basophils Relative 04/30/2018 1     Neutrophils Absolute 04/30/2018 3 75     Lymphocytes Absolute 04/30/2018 1 43     Monocytes Absolute 04/30/2018 0 52     Eosinophils Absolute 04/30/2018 0 37     Basophils Absolute 04/30/2018 0 04     Sodium 04/30/2018 137     Potassium 04/30/2018 4 5     Chloride 04/30/2018 98*    CO2 04/30/2018 34*    Anion Gap 04/30/2018 5     BUN 04/30/2018 15     Creatinine 04/30/2018 0 38*    Glucose 04/30/2018 140     Calcium 04/30/2018 8 7     eGFR 04/30/2018 130     Magnesium 04/30/2018 1 9     Prealbumin 04/30/2018 19 2         Assessment:     36 y o  male or female with planned surgery as above  Known risk factors for perioperative complications: Refusal of blood products even if needed to save life , patient was also in the  Hospital in the beginning of the year for sepsis with complicated abdominal surgery    Cardiac Risk Estimation: nishi Nolan is cleared from a cardiovascular standpoint to proceed with surgery  she is at a mild risk from a cardiovascular standpoint at this time without any additional cardiac testing  Reevaluation needed if he should present with symptoms prior to surgery  Will await blood-work for review prior to clearing patient for lumbar puncture, to make sure there is no elevated WBC or signs of infection  Plan:  Preoperative workup as follows: patient needs labwork prior to clearance   Change in medication regimen before surgery: Patient reports she is only taking omeprazole  Will hold off on faxing paperwork until we review blood-work  Addendum 08/27/2018:  12:55 p m :  Patient's blood work reviewed patient cleared for lumbar puncture

## 2018-08-25 LAB
APTT PPP: 25 SEC (ref 22–34)
APTT PPP: 26 SEC (ref 22–34)
B-HCG SERPL QL: NEGATIVE
BASOPHILS # BLD AUTO: 42 CELLS/UL (ref 0–200)
BASOPHILS NFR BLD AUTO: 0.8 %
BUN SERPL-MCNC: 16 MG/DL (ref 7–25)
BUN/CREAT SERPL: NORMAL (CALC) (ref 6–22)
CALCIUM SERPL-MCNC: 9 MG/DL (ref 8.6–10.2)
CHLORIDE SERPL-SCNC: 105 MMOL/L (ref 98–110)
CO2 SERPL-SCNC: 25 MMOL/L (ref 20–32)
CREAT SERPL-MCNC: 0.54 MG/DL (ref 0.5–1.1)
EOSINOPHIL # BLD AUTO: 151 CELLS/UL (ref 15–500)
EOSINOPHIL NFR BLD AUTO: 2.9 %
ERYTHROCYTE [DISTWIDTH] IN BLOOD BY AUTOMATED COUNT: 14.5 % (ref 11–15)
EST. AVERAGE GLUCOSE BLD GHB EST-MCNC: 94 (CALC)
EST. AVERAGE GLUCOSE BLD GHB EST-SCNC: 5.2 (CALC)
GLUCOSE SERPL-MCNC: 79 MG/DL (ref 65–99)
HBA1C MFR BLD: 4.9 % OF TOTAL HGB
HCT VFR BLD AUTO: 37.4 % (ref 35–45)
HGB BLD-MCNC: 12 G/DL (ref 11.7–15.5)
INR PPP: 1
INR PPP: 1
LYMPHOCYTES # BLD AUTO: 1004 CELLS/UL (ref 850–3900)
LYMPHOCYTES NFR BLD AUTO: 19.3 %
MCH RBC QN AUTO: 26.9 PG (ref 27–33)
MCHC RBC AUTO-ENTMCNC: 32.1 G/DL (ref 32–36)
MCV RBC AUTO: 83.9 FL (ref 80–100)
MONOCYTES # BLD AUTO: 416 CELLS/UL (ref 200–950)
MONOCYTES NFR BLD AUTO: 8 %
NEUTROPHILS # BLD AUTO: 3588 CELLS/UL (ref 1500–7800)
NEUTROPHILS NFR BLD AUTO: 69 %
PLATELET # BLD AUTO: 155 THOUSAND/UL (ref 140–400)
PMV BLD REES-ECKER: 11.5 FL (ref 7.5–12.5)
POTASSIUM SERPL-SCNC: 4.1 MMOL/L (ref 3.5–5.3)
PROTHROMBIN TIME: 10.4 SEC (ref 9–11.5)
PROTHROMBIN TIME: 10.8 SEC (ref 9–11.5)
RBC # BLD AUTO: 4.46 MILLION/UL (ref 3.8–5.1)
SL AMB EGFR AFRICAN AMERICAN: 134 ML/MIN/1.73M2
SL AMB EGFR NON AFRICAN AMERICAN: 116 ML/MIN/1.73M2
SODIUM SERPL-SCNC: 139 MMOL/L (ref 135–146)
WBC # BLD AUTO: 5.2 THOUSAND/UL (ref 3.8–10.8)

## 2018-08-27 ENCOUNTER — TELEPHONE (OUTPATIENT)
Dept: RADIOLOGY | Facility: HOSPITAL | Age: 43
End: 2018-08-27

## 2018-08-27 RX ORDER — SODIUM CHLORIDE 9 MG/ML
75 INJECTION, SOLUTION INTRAVENOUS CONTINUOUS
Status: CANCELLED | OUTPATIENT
Start: 2018-08-27

## 2018-08-29 ENCOUNTER — TELEPHONE (OUTPATIENT)
Dept: NEUROLOGY | Facility: CLINIC | Age: 43
End: 2018-08-29

## 2018-08-29 ENCOUNTER — ANESTHESIA (OUTPATIENT)
Dept: SURGERY | Facility: HOSPITAL | Age: 43
End: 2018-08-29
Payer: COMMERCIAL

## 2018-08-29 ENCOUNTER — ANESTHESIA EVENT (OUTPATIENT)
Dept: SURGERY | Facility: HOSPITAL | Age: 43
End: 2018-08-29
Payer: COMMERCIAL

## 2018-08-29 ENCOUNTER — HOSPITAL ENCOUNTER (OUTPATIENT)
Dept: RADIOLOGY | Facility: HOSPITAL | Age: 43
Discharge: HOME | End: 2018-08-29
Attending: RADIOLOGY | Admitting: RADIOLOGY
Payer: COMMERCIAL

## 2018-08-29 VITALS
OXYGEN SATURATION: 99 % | HEART RATE: 56 BPM | DIASTOLIC BLOOD PRESSURE: 68 MMHG | RESPIRATION RATE: 18 BRPM | SYSTOLIC BLOOD PRESSURE: 111 MMHG

## 2018-08-29 DIAGNOSIS — G93.2 PSEUDOTUMOR CEREBRI: ICD-10-CM

## 2018-08-29 PROCEDURE — 62270 DX LMBR SPI PNXR: CPT

## 2018-08-29 PROCEDURE — 62270 DX LMBR SPI PNXR: CPT | Performed by: RADIOLOGY

## 2018-08-29 PROCEDURE — 77003 FLUOROGUIDE FOR SPINE INJECT: CPT

## 2018-08-29 RX ORDER — MIDAZOLAM HYDROCHLORIDE 1 MG/ML
INJECTION INTRAMUSCULAR; INTRAVENOUS AS NEEDED
Status: DISCONTINUED | OUTPATIENT
Start: 2018-08-29 | End: 2018-08-29 | Stop reason: SURG

## 2018-08-29 RX ORDER — PROPOFOL 10 MG/ML
INJECTION, EMULSION INTRAVENOUS CONTINUOUS PRN
Status: DISCONTINUED | OUTPATIENT
Start: 2018-08-29 | End: 2018-08-29 | Stop reason: SURG

## 2018-08-29 RX ORDER — PROPOFOL 10 MG/ML
INJECTION, EMULSION INTRAVENOUS AS NEEDED
Status: DISCONTINUED | OUTPATIENT
Start: 2018-08-29 | End: 2018-08-29 | Stop reason: SURG

## 2018-08-29 RX ORDER — GLYCOPYRROLATE 0.2 MG/ML
INJECTION INTRAMUSCULAR; INTRAVENOUS AS NEEDED
Status: DISCONTINUED | OUTPATIENT
Start: 2018-08-29 | End: 2018-08-29 | Stop reason: SURG

## 2018-08-29 RX ORDER — SODIUM CHLORIDE 9 MG/ML
75 INJECTION, SOLUTION INTRAVENOUS CONTINUOUS
Status: DISCONTINUED | OUTPATIENT
Start: 2018-08-29 | End: 2018-08-29 | Stop reason: HOSPADM

## 2018-08-29 RX ORDER — LIDOCAINE HYDROCHLORIDE 10 MG/ML
INJECTION, SOLUTION INFILTRATION; PERINEURAL AS NEEDED
Status: DISCONTINUED | OUTPATIENT
Start: 2018-08-29 | End: 2018-08-29 | Stop reason: SURG

## 2018-08-29 RX ADMIN — SODIUM CHLORIDE: 0.9 INJECTION, SOLUTION INTRAVENOUS at 10:10

## 2018-08-29 RX ADMIN — PROPOFOL 130 MCG/KG/MIN: 10 INJECTION, EMULSION INTRAVENOUS at 12:30

## 2018-08-29 RX ADMIN — PROPOFOL 80 MG: 10 INJECTION, EMULSION INTRAVENOUS at 12:30

## 2018-08-29 RX ADMIN — GLYCOPYRROLATE 0.1 MG: 0.2 INJECTION, SOLUTION INTRAMUSCULAR; INTRAVENOUS at 12:56

## 2018-08-29 RX ADMIN — MIDAZOLAM HYDROCHLORIDE 2 MG: 1 INJECTION INTRAMUSCULAR; INTRAVENOUS at 12:24

## 2018-08-29 RX ADMIN — PROPOFOL 100 MG: 10 INJECTION, EMULSION INTRAVENOUS at 12:35

## 2018-08-29 RX ADMIN — LIDOCAINE HYDROCHLORIDE 100 MG: 10 INJECTION, SOLUTION INFILTRATION; PERINEURAL at 12:30

## 2018-08-29 NOTE — TELEPHONE ENCOUNTER
Left a message for patient about her elevated opening pressure  Recommended that she call Dr Arland Cogan office to make an appointment to discuss another shunt placement       ----- Message from Brian Larkin MD sent at 8/29/2018  1:16 PM EDT -----  Just finished  Opening pressure was 36  Took off 20 cc and went down to 10  So there is a potential endovascular treatment for her  Venous stenosis is often a cause for pseudotumor and we can treat with stent  Better option in my opinion than another shunt  Let me know if this is something you want her to consider  She can see me in clinic if so  Thanks    ----- Message -----  From: Cassius Kevin MD  Sent: 8/27/2018  12:29 PM  To: Brian Larkin MD    Hi,     Just pressure, please  Thx  ----- Message -----  From: Brian Larkin MD  Sent: 8/27/2018  11:45 AM  To: Cassius Kevin MD    Hi Sofi Ding,    What does Shweta need collected for her LP? Anything or just pressures?     Thanks,  Susan De León

## 2018-08-29 NOTE — DISCHARGE INSTRUCTIONS
Lumbar Puncture     WHAT YOU NEED TO KNOW:   Lumbar puncture (LP) is a procedure in which a needle is inserted in your back and into your spinal canal  This is usually done to collect cerebrospinal fluid (CSF) to check for an infection, inflammation, bleeding, or other conditions that affect the brain  CSF is a clear, protective fluid that flows around the brain and inside the spinal canal  LP may also be done to remove CSF to reduce pressure in the brain  DISCHARGE INSTRUCTIONS:     Follow up with your healthcare provider as directed: Write down your questions so you remember to ask them during your visits  Post-lumbar puncture headache: You may develop a headache during the first few hours after your LP that may last for several days  The headache may be mild to severe and may get worse when you sit or stand  The following may help ease a post-lumbar puncture headache:  · Drink plenty of liquids: You should drink more liquid than usual after your LP  Ask how much liquid is right for you  Caffeine may be used to treat a headache  Drinks, such as coffee, tea, or some sodas, have caffeine  Ask a Do not drink alcohol  · Lie down: If you have a headache after your lumbar puncture, it may be helpful to lie down and rest   · You may have a slight soreness over the LP area  This is normal   · Remove the band aid or dressing in 24 hours  · Contact Interventional Radiology imediately  at [phone] Liam PATIENTS: Contact Interventional Radiology at [phone]) Emily Peralta PATIENTS: Contact Interventional Radiology at [phone]) if any of the following occur:  · You have a severe headache that does not get better after you lie down  · Persistent nausea or vomiting   · You have a fever  · You have a stiff neck or have trouble thinking clearly  · Your legs, feet, or other parts below the waist feel numb, tingly, or weak     · You have bleeding or a discharge coming from the area where the needle was put into your back  · You have severe pain in your back or neck

## 2018-08-29 NOTE — BRIEF OP NOTE (RAD/CATH)
IR LUMBAR PUNCTURE  Procedure Note    PATIENT NAME: Igor Velasquez  : 1975  MRN: [id number]     Pre-op Diagnosis:   1  Pseudotumor cerebri      Post-op Diagnosis:   1  Pseudotumor cerebri        Surgeon:   Jeremy López MD  Assistants:     No qualified resident was available, Resident is only observing    Estimated Blood Loss: 0 cc  Findings: Opening pressure 36 cm H2O  20 cc removed  Closing rpesure 10 cm      Specimens: none    Complications:  none    Anesthesia: CHAU López MD     Date: 2018  Time: 1:23 PM

## 2018-08-29 NOTE — ANESTHESIA POSTPROCEDURE EVALUATION
Post-Op Assessment Note      CV Status:  Stable    Mental Status:  Awake and somnolent    Hydration Status:  Euvolemic    PONV Controlled:  Controlled    Airway Patency:  Patent    Post Op Vitals Reviewed: Yes          Staff: CRNA           BP      Temp      Pulse     Resp      SpO2

## 2018-08-29 NOTE — ANESTHESIA PREPROCEDURE EVALUATION
Review of Systems/Medical History  Patient summary reviewed  Chart reviewed  No history of anesthetic complications     Cardiovascular  Negative cardio ROS    Pulmonary  Negative pulmonary ROS        GI/Hepatic    GERD well controlled, Intestinal obstruction,   Comment: Hx of ENterocutaneous fistulae          Endo/Other     GYN       Hematology   Musculoskeletal    Arthritis     Neurology    Headaches,   Comment: Pseudo tumor cerebri  Post VPS and removal Psychology   Anxiety, Depression ,              Physical Exam    Airway    Mallampati score: II  TM Distance: >3 FB  Neck ROM: full     Dental       Cardiovascular  Comment: Negative ROS, Rhythm: regular, Rate: normal, Cardiovascular exam normal    Pulmonary  Pulmonary exam normal Breath sounds clear to auscultation,     Other Findings        Anesthesia Plan  ASA Score- 2     Anesthesia Type- IV sedation with anesthesia with ASA Monitors  Additional Monitors:   Airway Plan: NTT  Plan Factors-    Induction- intravenous  Postoperative Plan-     Informed Consent- Anesthetic plan and risks discussed with patient  I personally reviewed this patient with the CRNA  Discussed and agreed on the Anesthesia Plan with the CRNA  Stella Galvan

## 2018-08-30 ENCOUNTER — TELEPHONE (OUTPATIENT)
Dept: NEUROSURGERY | Facility: CLINIC | Age: 43
End: 2018-08-30

## 2018-08-30 NOTE — TELEPHONE ENCOUNTER
Call transferred to direct line - patient looking to schedule appt in the office  Had an LP completed recently and was told by neuro to contact the office to set up appt  Due to elevated pressure  Scheduled with Dr Xochitl Ortega and advised patient to make appt with opthalmology in the meantime  She was appreciative

## 2018-09-05 ENCOUNTER — TELEPHONE (OUTPATIENT)
Dept: INTERNAL MEDICINE CLINIC | Facility: CLINIC | Age: 43
End: 2018-09-05

## 2018-09-06 ENCOUNTER — OFFICE VISIT (OUTPATIENT)
Dept: BARIATRICS | Facility: CLINIC | Age: 43
End: 2018-09-06

## 2018-09-06 VITALS
RESPIRATION RATE: 18 BRPM | WEIGHT: 131 LBS | HEIGHT: 63 IN | BODY MASS INDEX: 23.21 KG/M2 | DIASTOLIC BLOOD PRESSURE: 66 MMHG | SYSTOLIC BLOOD PRESSURE: 112 MMHG | TEMPERATURE: 98 F | HEART RATE: 72 BPM

## 2018-09-06 DIAGNOSIS — E66.01 OBESITY, CLASS III, BMI 40-49.9 (MORBID OBESITY) (HCC): Primary | ICD-10-CM

## 2018-09-06 PROCEDURE — 99214 OFFICE O/P EST MOD 30 MIN: CPT | Performed by: SURGERY

## 2018-09-06 NOTE — PROGRESS NOTES
Follow Up - Bariatric Surgery   Pauly Rogers 36 y o  female MRN: [id number]  Unit/Bed#:  Encounter: [id number]            Subjective: Patient reports occasional abdominal pain when eating meats  Reports abdominal soreness at drain sites  Otherwise tolerating diet  Objective:       Lab, Imaging and other studies: CBC: No results found for: WBC, HGB, HCT, MCV, PLT, ADJUSTEDWBC, MCH, MCHC, RDW, MPV, NRBC  CMP: No results found for: NA, CL, CO2, ANIONGAP, BUN, CREATININE, GLUCOSE, CALCIUM, AST, ALT, ALKPHOS, PROT, ALBUMIN, BILITOT, EGFR    Family History:   Family History   Problem Relation Age of Onset    No Known Problems Mother     No Known Problems Father     Thyroid cancer Brother     Colon cancer Maternal Grandfather     Cancer Paternal Grandmother     Cancer Paternal Grandfather        Meds/Allergies   PTA meds:   Cannot display prior to admission medications because the patient has not been admitted in this contact  Vitals: Blood pressure 112/66, pulse 72, temperature 98 °F (36 7 °C), resp  rate 18, height 5' 3" (1 6 m), weight 59 4 kg (131 lb), not currently breastfeeding  ,Body mass index is 23 21 kg/m²  I/O     None          Invasive Devices          No matching active lines, drains, or airways          Physical Exam:     NAD  Abdomen soft non-tender, incisions well healed  No palpable hernias    Assessment/Plan:    Patient is s/p EGD  3 months out, doing very well, no complaints, following instructions  I emphasized the need to be compliant with vitamin, calcium and protein intake on a daily basis  We also talked about daily excercise  In addition the patient was instructed to call with the development of nausea, vomiting, fever, chills or any episode of abdominal pain    - diet as tolerated  - OK to take vitamin pills and chewables  - Follow up with Praveen Kuo in 1 year  - Follow up with dietician            Demian Adams

## 2018-09-14 ENCOUNTER — OFFICE VISIT (OUTPATIENT)
Dept: NEUROSURGERY | Facility: CLINIC | Age: 43
End: 2018-09-14
Payer: COMMERCIAL

## 2018-09-14 VITALS
RESPIRATION RATE: 16 BRPM | SYSTOLIC BLOOD PRESSURE: 124 MMHG | TEMPERATURE: 97.5 F | HEIGHT: 63 IN | DIASTOLIC BLOOD PRESSURE: 76 MMHG | HEART RATE: 76 BPM | BODY MASS INDEX: 23.57 KG/M2 | WEIGHT: 133 LBS

## 2018-09-14 DIAGNOSIS — G93.2 PSEUDOTUMOR CEREBRI: Primary | ICD-10-CM

## 2018-09-14 PROCEDURE — 99214 OFFICE O/P EST MOD 30 MIN: CPT | Performed by: RADIOLOGY

## 2018-09-14 NOTE — PROGRESS NOTES
Assessment/Plan:     Diagnoses and all orders for this visit:    Pseudotumor cerebri  -     MRA and or MRV head wo contrast; Future  -     MRI brain w wo contrast; Future            Discussion Summary:     Ms Samir Rojas has recurrence of symptomatic pseudotumor cerebri  I have offered her potential venous stenting as an alternative to ventricular shunting given her prior history of GI perforation and peritonitis  She is unsure of what she wants to do and wants her condition treated but has severe anxiety with her past experience and wants to avoid hospitalizations at all costs  We will obtain an MRI of the brain and MRV to assess for venous narrowing  She will return to clinic in 2 weeks to discuss the results  If positive, we will then discuss which treatment she would like to proceed with  She states she cannot take Diamox due to her history of renal stones  The patient, patient's family was counseled regarding diagnostic results, instructions for management  Total time of encounter was 60 minutes and 35 minutes was spent counseling  Chief Complaint: Follow-up (Elevated pressure)      Patient ID: Andrea Terry is a 36 y o  female    HPI   Mrs Samir Rojas returns for follow-up after her lumbar puncture performed for her history of pseudotumor cerebri  She complains of constant head pressure  Not worse in any position  Describes "zingers" a stabbing/shooting pain from the right to left side of her head  No alleviating or exacerbating factors to these symptoms however getting up too quickly makes her head pressure worse  These symptoms never went quite away after shunting however did worsen after removal      She states she has had blurry vision for a while but thinks it possibly getting worse  Recent eye exam demonstrated papilledema  No tinnitus  Recent LP showed an elevated OP up to 36 cm   After removal of fluid and decreased closing pressures, she experienced good improvement in her symptoms however returned to baseline after 48 hours  Review of Systems   HENT: Negative  Eyes: Positive for visual disturbance (sometimes blurry)  Negative for photophobia, pain, discharge, redness and itching  Respiratory: Negative  Cardiovascular: Negative  Gastrointestinal: Positive for abdominal pain  Negative for abdominal distention, anal bleeding, blood in stool, constipation, diarrhea, nausea, rectal pain and vomiting  Endocrine: Negative  Genitourinary: Negative  Musculoskeletal: Positive for gait problem (sometimes), neck pain and neck stiffness  Negative for arthralgias, back pain, joint swelling and myalgias  Skin: Negative  Allergic/Immunologic: Negative  Neurological: Positive for weakness and headaches (pressure)  Negative for dizziness, tremors, seizures, syncope, facial asymmetry, speech difficulty, light-headedness and numbness  Hematological: Negative  Psychiatric/Behavioral: Negative            The following portions of the patient's history were reviewed and updated as appropriate: allergies, current medications, past family history, past medical history, past social history, past surgical history and problem list       Active Ambulatory Problems     Diagnosis Date Noted    Pseudotumor cerebri 05/31/2017    S/P  shunt 05/31/2017    Occipital headache 05/31/2017    Brain condition     Gastric leak 01/24/2018    Acute peritonitis 01/24/2018    Idiopathic intracranial hypertension 01/24/2018    S/P  shunt 01/24/2018    Infection of  (ventriculoperitoneal) shunt, subsequent encounter 01/24/2018    Murmur, cardiac 01/26/2018    Refusal of blood product 01/31/2018    Gastroesophageal reflux disease 12/15/2017    Choroidal nevus, right eye 02/03/2018    Moderate protein-calorie malnutrition ([address] ) 02/21/2018    Gastric perforation ([address] ) 01/24/2018    Postoperative intra-abdominal abscess ([address] ) 03/05/2018    Abdominal wound dehiscence 03/05/2018    Neutrophilic leukocytosis 16/50/2079    Left-sided chest wall pain 03/06/2018    Bariatric surgery status 06/22/2018    Pre-op exam 08/24/2018     Resolved Ambulatory Problems     Diagnosis Date Noted    Peritonitis 01/24/2018    Adjustment disorder with depressed mood 02/08/2018     Past Medical History:   Diagnosis Date    Abdominal pain     Brain condition     Esophageal perforation     Gastric leak     GERD (gastroesophageal reflux disease)     Migraine     Obesity     Papilledema, both eyes     Postgastrectomy malabsorption     Presence of lumboperitoneal shunt     Rotator cuff tendinitis     Visual field defect        Past Surgical History:   Procedure Laterality Date    CSF SHUNT      LP shunt - 2015 -  shunt - 2017    ESOPHAGOGASTRODUODENOSCOPY N/A 2/23/2018    Procedure: ESOPHAGOGASTRODUODENOSCOPY (EGD) WITH ESOPHAGEAL STENT PLACEMENT;  Surgeon: Marly Wong MD;  [facility];  Service: Thoracic    ESOPHAGOGASTRODUODENOSCOPY N/A 2/23/2018    Procedure: ESOPHAGOGASTRODUODENOSCOPY (EGD) WITH REMOVAL ESOPHAGEAL STENT  AND REPLACEMENT WITH 23mm X155mm 1[address] [facility] [address];  Surgeon: Marly Wong MD;  [facility];  Service: Thoracic    ESOPHAGOGASTRODUODENOSCOPY N/A 3/28/2018    Procedure: ESOPHAGOGASTRODUODENOSCOPY (EGD) with PEJ placement ;  Surgeon: Bia Castillo MD;  [facility]; Service: Gastroenterology    ESOPHAGOGASTRODUODENOSCOPY N/A 4/5/2018    Procedure: ESOPHAGOGASTRODUODENOSCOPY (EGD) with botox injection and kaofed placement;  Surgeon: Lorena Zayas DO;  [facility]; Service: Gastroenterology    ESOPHAGOGASTRODUODENOSCOPY N/A 4/10/2018    Procedure: ESOPHAGOGASTRODUODENOSCOPY (EGD) with Kaofed placement;  Surgeon: Shravan Vee MD;  [facility];   Service: Gastroenterology    ESOPHAGOSCOPY WITH STENT INSERTION N/A 1/24/2018    Procedure: INSERTION STENT ESOPHAGEAL;  Surgeon: Tita Gurrola MD;  [facility]; Service: Gastroenterology    EYE SURGERY      for "crossed eyed" (in 2nd grade)     GASTRIC BYPASS  2016    HERNIA REPAIR      HYSTERECTOMY      IR LUMBAR PUNCTURE  8/29/2018    LAPAROTOMY N/A 1/25/2018    Procedure: Exploratory Laparotomy, wash out,placement of drains, placement of NG feeding tube ; Surgeon: Kati Gómez DO;  [facility];  Service: General    [place] CREATE SHUNT:VENTRIC-PERITONEAL Right 5/31/2017    Procedure: IMAGE GUIDED CORONAL PLACEMENT OF PROGRAMABLE VENTRICULAR-PERITONEAL SHUNT, REMOVAL OF LP SHUNT ;  Surgeon: Patito Bernal MD;  [facility];  Service: Neurosurgery    [place] EGD TRANSORAL BIOPSY SINGLE/MULTIPLE N/A 6/27/2018    Procedure: ESOPHAGOGASTRODUODENOSCOPY (EGD) with padlock clip placement;  Surgeon: Theresa Medel MD;  [facility]; Service: Earnstine Gouge CSF SHUNT,W/O REPLACE Right 2/5/2018    Procedure: Removal of  shunt;  Surgeon: Patito Bernal MD;  [facility];  Service: Neurosurgery    [place] REPLACEMENT/REVISION,CSF SHUNT Right 1/25/2018    Procedure: Externalization of right-sided SHUNT VENTRICULAR-PERITONEAL in anterior chest wall ribs two and three level  ;  Surgeon: Christine Geronimo MD;  [facility];  Service: Neurosurgery         Vitals:    09/14/18 1057   BP: 124/76   Pulse: 76   Resp: 16   Temp: 97 5 °F (36 4 °C)         Objective:    Physical Exam   Constitutional: She is oriented to person, place, and time  She appears well-developed and well-nourished  HENT:   Head: Normocephalic and atraumatic  Eyes: Conjunctivae and EOM are normal  Pupils are equal, round, and reactive to light  Neck: Normal range of motion  Neck supple  Cardiovascular: Normal rate, regular rhythm and normal heart sounds  Pulmonary/Chest: Effort normal and breath sounds normal    Abdominal: Soft  Bowel sounds are normal    Musculoskeletal: Normal range of motion     Neurological: She is alert and oriented to person, place, and time  She displays abnormal reflex  No cranial nerve deficit  She exhibits normal muscle tone  Coordination normal    3+ hyperreflexia  No clonus or hoffmans   Skin: Skin is warm and dry  Multiple abdominal wall scars, well healed  Psychiatric: She has a normal mood and affect  Her behavior is normal  Judgment and thought content normal      Neurologic Exam     Mental Status   Oriented to person, place, and time  Cranial Nerves     CN III, IV, VI   Pupils are equal, round, and reactive to light  Extraocular motions are normal        Results/Data:  I have reviewed the results from the lumbar puncture in detail with the patient

## 2018-09-14 NOTE — LETTER
September 14, 2018     DO [address]    Patient: Addison Contreras   YOB: 1975   Date of Visit: 9/14/2018       Dear Dr Manju Rdz: Thank you for referring Rain Isabel to me for evaluation  Below are my notes for this consultation  If you have questions, please do not hesitate to call me  I look forward to following your patient along with you  Sincerely,        Lana Swanson MD        CC: MD Hilary Ray MD Loren Glimpse, MD  9/14/2018  2:04 PM  Sign at close encounter  Assessment/Plan:     Diagnoses and all orders for this visit:    Pseudotumor cerebri  -     MRA and or MRV head wo contrast; Future  -     MRI brain w wo contrast; Future            Discussion Summary:     Ms Sylvester Matos has recurrence of symptomatic pseudotumor cerebri  I have offered her potential venous stenting as an alternative to ventricular shunting given her prior history of GI perforation and peritonitis  She is unsure of what she wants to do and wants her condition treated but has severe anxiety with her past experience and wants to avoid hospitalizations at all costs  We will obtain an MRI of the brain and MRV to assess for venous narrowing  She will return to clinic in 2 weeks to discuss the results  If positive, we will then discuss which treatment she would like to proceed with  She states she cannot take Diamox due to her history of renal stones  The patient, patient's family was counseled regarding diagnostic results, instructions for management  Total time of encounter was 60 minutes and  35 minutes was spent counseling  Chief Complaint: Follow-up (Elevated pressure)      Patient ID: Addison Contreras is a 36 y o  female    HPI   Mrs Sylvester Matos returns for follow-up after her lumbar puncture performed for her history of pseudotumor cerebri  She complains of constant head pressure  Not worse in any position   Describes "zingers" a stabbing/shooting pain from the right to left side of her head  No alleviating or exacerbating factors to these symptoms however getting up too quickly makes her head pressure worse  These symptoms never went quite away after shunting however did worsen after removal      She states she has had blurry vision for a while but thinks it possibly getting worse  Recent eye exam demonstrated papilledema  No tinnitus  Recent LP showed an elevated OP up to 36 cm  After removal of fluid and decreased closing pressures, she experienced good improvement in her symptoms however returned to baseline after 48 hours  Review of Systems   HENT: Negative  Eyes: Positive for visual disturbance (sometimes blurry)  Negative for photophobia, pain, discharge, redness and itching  Respiratory: Negative  Cardiovascular: Negative  Gastrointestinal: Positive for abdominal pain  Negative for abdominal distention, anal bleeding, blood in stool, constipation, diarrhea, nausea, rectal pain and vomiting  Endocrine: Negative  Genitourinary: Negative  Musculoskeletal: Positive for gait problem (sometimes), neck pain and neck stiffness  Negative for arthralgias, back pain, joint swelling and myalgias  Skin: Negative  Allergic/Immunologic: Negative  Neurological: Positive for weakness and headaches (pressure)  Negative for dizziness, tremors, seizures, syncope, facial asymmetry, speech difficulty, light-headedness and numbness  Hematological: Negative  Psychiatric/Behavioral: Negative            The following portions of the patient's history were reviewed and updated as appropriate: allergies, current medications, past family history, past medical history, past social history, past surgical history and problem list       Active Ambulatory Problems     Diagnosis Date Noted    Pseudotumor cerebri 05/31/2017    S/P  shunt 05/31/2017    Occipital headache 05/31/2017    Brain condition     Gastric leak 01/24/2018    Acute peritonitis 01/24/2018    Idiopathic intracranial hypertension 01/24/2018    S/P  shunt 01/24/2018    Infection of  (ventriculoperitoneal) shunt, subsequent encounter 01/24/2018    Murmur, cardiac 01/26/2018    Refusal of blood product 01/31/2018    Gastroesophageal reflux disease 12/15/2017    Choroidal nevus, right eye 02/03/2018    Moderate protein-calorie malnutrition ([address] ) 02/21/2018    Gastric perforation ([address] ) 01/24/2018    Postoperative intra-abdominal abscess ([address] ) 03/05/2018    Abdominal wound dehiscence 70/16/9837    Neutrophilic leukocytosis 03/43/4174    Left-sided chest wall pain 03/06/2018    Bariatric surgery status 06/22/2018    Pre-op exam 08/24/2018     Resolved Ambulatory Problems     Diagnosis Date Noted    Peritonitis 01/24/2018    Adjustment disorder with depressed mood 02/08/2018     Past Medical History:   Diagnosis Date    Abdominal pain     Brain condition     Esophageal perforation     Gastric leak     GERD (gastroesophageal reflux disease)     Migraine     Obesity     Papilledema, both eyes     Postgastrectomy malabsorption     Presence of lumboperitoneal shunt     Rotator cuff tendinitis     Visual field defect        Past Surgical History:   Procedure Laterality Date    CSF SHUNT      LP shunt - 2015 -  shunt - 2017    ESOPHAGOGASTRODUODENOSCOPY N/A 2/23/2018    Procedure: ESOPHAGOGASTRODUODENOSCOPY (EGD) WITH ESOPHAGEAL STENT PLACEMENT;  Surgeon: Vinh Davis MD;  [facility];  Service: Thoracic    ESOPHAGOGASTRODUODENOSCOPY N/A 2/23/2018    Procedure: ESOPHAGOGASTRODUODENOSCOPY (EGD) WITH REMOVAL ESOPHAGEAL STENT  AND REPLACEMENT WITH 23mm X155mm 1111 [address] [place] [address],[address];  Surgeon: Vinh Davis MD;  [facility];  Service: Thoracic    ESOPHAGOGASTRODUODENOSCOPY N/A 3/28/2018    Procedure: ESOPHAGOGASTRODUODENOSCOPY (EGD) with PEJ placement ;  Surgeon: Vandana Soto MD;  [facility]; Service: Gastroenterology    ESOPHAGOGASTRODUODENOSCOPY N/A 4/5/2018    Procedure: ESOPHAGOGASTRODUODENOSCOPY (EGD) with botox injection and kaofed placement;  Surgeon: Tommy Winters DO;  [facility]; Service: Gastroenterology    ESOPHAGOGASTRODUODENOSCOPY N/A 4/10/2018    Procedure: ESOPHAGOGASTRODUODENOSCOPY (EGD) with Kaofed placement;  Surgeon: Itzel Shoemaker MD;  [facility]; Service: Gastroenterology    ESOPHAGOSCOPY WITH STENT INSERTION N/A 1/24/2018    Procedure: INSERTION STENT ESOPHAGEAL;  Surgeon: Earl Pollock MD;  [facility]; Service: Gastroenterology    EYE SURGERY      for "crossed eyed" (in 2nd grade)     GASTRIC BYPASS  2016    HERNIA REPAIR      HYSTERECTOMY      IR LUMBAR PUNCTURE  8/29/2018    LAPAROTOMY N/A 1/25/2018    Procedure: Exploratory Laparotomy, wash out,placement of drains, placement of NG feeding tube ; Surgeon: Roz Rodriguez DO;  [facility];  Service: General    [place] CREATE SHUNT:VENTRIC-PERITONEAL Right 5/31/2017    Procedure: IMAGE GUIDED CORONAL PLACEMENT OF PROGRAMABLE VENTRICULAR-PERITONEAL SHUNT, REMOVAL OF LP SHUNT ;  Surgeon: Zack Pena MD;  [facility];  Service: Neurosurgery    [place] EGD TRANSORAL BIOPSY SINGLE/MULTIPLE N/A 6/27/2018    Procedure: ESOPHAGOGASTRODUODENOSCOPY (EGD) with padlock clip placement;  Surgeon: Stas Vidal MD;  [facility];   Service: Emily Pall CSF SHUNT,W/O REPLACE Right 2/5/2018    Procedure: Removal of  shunt;  Surgeon: Zack Pena MD;  [facility];  Service: Neurosurgery    [place] REPLACEMENT/REVISION,CSF SHUNT Right 1/25/2018    Procedure: Externalization of right-sided SHUNT VENTRICULAR-PERITONEAL in anterior chest wall ribs two and three level  ;  Surgeon: Gelacio Carvalho MD;  [facility];  Service: Neurosurgery         Vitals:    09/14/18 1057   BP: 124/76   Pulse: 76   Resp: 16   Temp: 97 5 °F (36 4 °C) Objective:    Physical Exam   Constitutional: She is oriented to person, place, and time  She appears well-developed and well-nourished  HENT:   Head: Normocephalic and atraumatic  Eyes: Conjunctivae and EOM are normal  Pupils are equal, round, and reactive to light  Neck: Normal range of motion  Neck supple  Cardiovascular: Normal rate, regular rhythm and normal heart sounds  Pulmonary/Chest: Effort normal and breath sounds normal    Abdominal: Soft  Bowel sounds are normal    Musculoskeletal: Normal range of motion  Neurological: She is alert and oriented to person, place, and time  She displays abnormal reflex  No cranial nerve deficit  She exhibits normal muscle tone  Coordination normal    3+ hyperreflexia  No clonus or hoffmans   Skin: Skin is warm and dry  Multiple abdominal wall scars, well healed  Psychiatric: She has a normal mood and affect  Her behavior is normal  Judgment and thought content normal      Neurologic Exam     Mental Status   Oriented to person, place, and time  Cranial Nerves     CN III, IV, VI   Pupils are equal, round, and reactive to light  Extraocular motions are normal        Results/Data:  I have reviewed the results from the lumbar puncture in detail with the patient

## 2018-09-25 ENCOUNTER — HOSPITAL ENCOUNTER (OUTPATIENT)
Dept: MRI IMAGING | Facility: HOSPITAL | Age: 43
Discharge: HOME | End: 2018-09-25
Attending: RADIOLOGY
Payer: COMMERCIAL

## 2018-09-25 DIAGNOSIS — G93.2 PSEUDOTUMOR CEREBRI: ICD-10-CM

## 2018-09-25 PROCEDURE — 70544 MR ANGIOGRAPHY HEAD W/O DYE: CPT

## 2018-09-25 PROCEDURE — 70551 MRI BRAIN STEM W/O DYE: CPT

## 2018-09-28 ENCOUNTER — OFFICE VISIT (OUTPATIENT)
Dept: INTERNAL MEDICINE CLINIC | Facility: CLINIC | Age: 43
End: 2018-09-28
Payer: COMMERCIAL

## 2018-09-28 VITALS
HEIGHT: 63 IN | WEIGHT: 132.8 LBS | HEART RATE: 68 BPM | TEMPERATURE: 98.3 F | BODY MASS INDEX: 23.53 KG/M2 | DIASTOLIC BLOOD PRESSURE: 72 MMHG | OXYGEN SATURATION: 99 % | SYSTOLIC BLOOD PRESSURE: 110 MMHG

## 2018-09-28 DIAGNOSIS — K21.9 GASTROESOPHAGEAL REFLUX DISEASE WITHOUT ESOPHAGITIS: ICD-10-CM

## 2018-09-28 DIAGNOSIS — Z28.29 REFUSAL OF INFLUENZA VACCINE BY PROVIDER: ICD-10-CM

## 2018-09-28 DIAGNOSIS — Z12.39 SCREENING FOR BREAST CANCER: Primary | ICD-10-CM

## 2018-09-28 PROCEDURE — 99213 OFFICE O/P EST LOW 20 MIN: CPT | Performed by: FAMILY MEDICINE

## 2018-09-28 RX ORDER — MULTIVITAMIN
1 TABLET ORAL DAILY
COMMUNITY

## 2018-09-28 NOTE — PROGRESS NOTES
Assessment/Plan:    No problem-specific Assessment & Plan notes found for this encounter  Problem List Items Addressed This Visit        Digestive    Gastroesophageal reflux disease       Other    Refusal of influenza vaccine by provider      Other Visit Diagnoses     Screening for breast cancer    -  Primary           discussion, laboratory data reviewed  Past history reviewed with her and discussed  Questions and concerns addressed  Continue with multivitamin and calcium supplementation per bariatric surgery  Continue with Prilosec daily per specialist   Follow up with Neurosurgery for her BP start and continue with Ophthalmology follow-up every 3 weeks  I have advised the patient to return to us on an as needed basis   And continue with home physical therapy  Patient denies any depression and states that she is doing well        Subjective:      Patient ID: Zainab Russell is a 39 y o  female  HPI    41-year-old female here for routine follow-up  Has had a complicated past medical history with of hernia repair and underwent several complications such as peritoneal abscess acute peritonitis and sepsis on life support due to a nick in her stomach and leakage of fluid  She also underwent acute rehab and had a NG tube for feeding and was very malnourished  She has had her tube and ADENIKE drain removed for 6 weeks and feels better every day however she is still very fatigued and unable to do her normal things that she could do before getting sick  She has completed all of her physical therapy and does therapy at home  She occasionally has epigastric pain after eating but this resolves itself it is not persistent  No changes in skin color, no jaundice no vomiting and no nausea and no doubling over abdominal pain no heartburn or reflux symptoms  She tolerates her bariatric diet and bariatric vitamins as directed  Of note, she needs her  shunt replaced and has an appointment with Neurosurgery    Her opening pressure was too high  She follows with an eye doctor every 3 weeks and is compliant with her medical care  She has good social support at home and is happy that she has put on some weight      The following portions of the patient's history were reviewed and updated as appropriate: allergies, current medications, past family history, past medical history, past social history, past surgical history and problem list     Review of Systems      Constitutional:  Denies fever or chills   Eyes:  Denies change in visual acuity   HENT:  Denies nasal congestion or sore throat   Respiratory:  Denies cough or shortness of breath or wheezing  Cardiovascular:  Denies palpitations or chest pain  GI:  Denies abdominal pain, nausea, or vomiting  Integument:  Denies rash   Neurologic:  Denies headache or focal weakness,  No dizziness, sleeping well        Objective:      /72 (BP Location: Left arm, Patient Position: Sitting, Cuff Size: Adult)   Pulse 68   Temp 98 3 °F (36 8 °C) (Oral)   Ht 5' 3" (1 6 m)   Wt 60 2 kg (132 lb 12 8 oz)   LMP  (LMP Unknown)   SpO2 99% Comment: room air  BMI 23 52 kg/m²          Physical Exam      Constitutional:  Well developed, well nourished, no acute distress, non-toxic appearance   Eyes:  PERRL, conjunctiva normal , non icteric sclera  HENT:  Atraumatic, oropharynx moist  Neck-  supple   Respiratory:  CTA b/l, normal breath sounds, no rales, no wheezing   Cardiovascular:  RRR, no murmurs, no LE edema b/l  GI:  Soft, nondistended, normal bowel sounds x 4, nontender, no organomegaly, no mass, no rebound, no guarding   Neurologic:  no focal deficits noted   Psychiatric:  Speech and behavior appropriate , AAO x 3   skin, multiple tattoos and multiple healed surgical scars in her abdomen and thorax area    No signs of any infection or cellulitis

## 2018-10-04 ENCOUNTER — OFFICE VISIT (OUTPATIENT)
Dept: NEUROSURGERY | Facility: CLINIC | Age: 43
End: 2018-10-04
Payer: COMMERCIAL

## 2018-10-04 ENCOUNTER — TELEPHONE (OUTPATIENT)
Dept: UROLOGY | Facility: MEDICAL CENTER | Age: 43
End: 2018-10-04

## 2018-10-04 VITALS
DIASTOLIC BLOOD PRESSURE: 69 MMHG | SYSTOLIC BLOOD PRESSURE: 130 MMHG | BODY MASS INDEX: 23.53 KG/M2 | RESPIRATION RATE: 16 BRPM | WEIGHT: 132.8 LBS | HEIGHT: 63 IN | TEMPERATURE: 97 F | HEART RATE: 63 BPM

## 2018-10-04 DIAGNOSIS — G93.2 PSEUDOTUMOR CEREBRI: Primary | ICD-10-CM

## 2018-10-04 DIAGNOSIS — N20.0 RENAL STONES: ICD-10-CM

## 2018-10-04 PROCEDURE — 99214 OFFICE O/P EST MOD 30 MIN: CPT | Performed by: RADIOLOGY

## 2018-10-04 NOTE — PROGRESS NOTES
Assessment/Plan:     Diagnoses and all orders for this visit:    Pseudotumor cerebri  -     Ambulatory referral to Urology; Future  -     Comprehensive metabolic panel; Future    Renal stones  -     Ambulatory referral to Urology; Future  -     Comprehensive metabolic panel; Future    Other orders  -     patient supplied medication; Take 1 each by mouth daily bariatric vitamin, patient unsure of medication name            Discussion Summary:   We have discussed performing a diagnostic venography with manometry and possible stenting as a treatment option for her pseudotumor  However she has considerable anxiety over her pseudotumor and would like immediate treatment  She is equally concerned about another shunt  Since she has not tried Diamox before, we will try this approach first  She will goto her Urologist to determine type of her stones and if Diamox is relatively safe  She will return to opthalmology for baseline evaluation, she will then start Diamox therapy with a one month trial  She will contact our office once she has seen opthalmology  The patient, patient's family was counseled regarding diagnostic results, instructions for management  Total time of encounter was 70 minutes and 45 minutes was spent counseling  Chief Complaint: Follow-up (1 month follow up)      Patient ID: Nimisha Lubin is a 39 y o  female    HPI     Has persistent headaches and pressure  Generally stable however over the weekend was severe and had to stay in bed all day  Has occasional vision changes with blurriness  Next opthalmology appointment is in Nov  She returns after her MRI/V however refused contrast therefore it is a slightly limited examination  Nevertheless, the MRV showed left transverse sinus stenosis  Review of Systems   Constitutional: Positive for fatigue  Negative for activity change, appetite change, chills, diaphoresis, fever and unexpected weight change  HENT: Negative      Eyes: Positive for visual disturbance (sometimes blurry or double)  Negative for photophobia, pain, discharge, redness and itching  Respiratory: Negative  Cardiovascular: Negative  Gastrointestinal: Positive for constipation (sometimes)  Negative for abdominal distention, abdominal pain, anal bleeding, blood in stool, diarrhea, nausea, rectal pain and vomiting  Endocrine: Negative  Genitourinary: Negative  Musculoskeletal: Positive for gait problem (sometimes off) and neck pain (neck and shoulder)  Negative for arthralgias, back pain, joint swelling, myalgias and neck stiffness  Skin: Negative  Allergic/Immunologic: Negative  Neurological: Positive for weakness, numbness (lip and chin) and headaches (pressure in head, never goes away)  Negative for dizziness, tremors, seizures, syncope, facial asymmetry, speech difficulty and light-headedness  Psychiatric/Behavioral: Negative            The following portions of the patient's history were reviewed and updated as appropriate: allergies, current medications, past family history, past medical history, past social history, past surgical history and problem list       Active Ambulatory Problems     Diagnosis Date Noted    Pseudotumor cerebri 05/31/2017    S/P  shunt 05/31/2017    Occipital headache 05/31/2017    Brain condition     Gastric leak 01/24/2018    Acute peritonitis 01/24/2018    Idiopathic intracranial hypertension 01/24/2018    S/P  shunt 01/24/2018    Infection of  (ventriculoperitoneal) shunt, subsequent encounter 01/24/2018    Murmur, cardiac 01/26/2018    Refusal of blood product 01/31/2018    Gastroesophageal reflux disease 12/15/2017    Choroidal nevus, right eye 02/03/2018    Moderate protein-calorie malnutrition ([address] ) 02/21/2018    Gastric perforation ([address] ) 01/24/2018    Postoperative intra-abdominal abscess 03/05/2018    Abdominal wound dehiscence 82/96/8771    Neutrophilic leukocytosis 59/88/5537    Left-sided chest wall pain 03/06/2018    Bariatric surgery status 06/22/2018    Pre-op exam 08/24/2018    Refusal of influenza vaccine by provider 09/28/2018     Resolved Ambulatory Problems     Diagnosis Date Noted    Peritonitis 01/24/2018    Adjustment disorder with depressed mood 02/08/2018     Past Medical History:   Diagnosis Date    Abdominal pain     Brain condition     De Quervain's disease (tenosynovitis)     Esophageal perforation     Gastric leak     GERD (gastroesophageal reflux disease)     Idiopathic intracranial hypertension     Migraine     Obesity     Papilledema, both eyes     Postgastrectomy malabsorption     Presence of lumboperitoneal shunt     Rotator cuff tendinitis     Visual field defect        Past Surgical History:   Procedure Laterality Date    CSF SHUNT      LP shunt - 2015 -  shunt - 2017    ESOPHAGOGASTRODUODENOSCOPY N/A 2/23/2018    Procedure: ESOPHAGOGASTRODUODENOSCOPY (EGD) WITH ESOPHAGEAL STENT PLACEMENT;  Surgeon: Gelacio Fernandes MD;  [facility];  Service: Thoracic    ESOPHAGOGASTRODUODENOSCOPY N/A 2/23/2018    Procedure: ESOPHAGOGASTRODUODENOSCOPY (EGD) WITH REMOVAL ESOPHAGEAL STENT  AND REPLACEMENT WITH 23mm X155mm [address] [facility] [address];  Surgeon: Gelacio Fernandes MD;  [facility];  Service: Thoracic    ESOPHAGOGASTRODUODENOSCOPY N/A 3/28/2018    Procedure: ESOPHAGOGASTRODUODENOSCOPY (EGD) with PEJ placement ;  Surgeon: Ross Mccallum MD;  [facility]; Service: Gastroenterology    ESOPHAGOGASTRODUODENOSCOPY N/A 4/5/2018    Procedure: ESOPHAGOGASTRODUODENOSCOPY (EGD) with botox injection and kaofed placement;  Surgeon: Roldan Alcazar DO;  [facility]; Service: Gastroenterology    ESOPHAGOGASTRODUODENOSCOPY N/A 4/10/2018    Procedure: ESOPHAGOGASTRODUODENOSCOPY (EGD) with Kaofed placement;  Surgeon: Sally Clarke MD;  [facility];   Service: Gastroenterology    ESOPHAGOSCOPY WITH STENT INSERTION N/A 1/24/2018 Procedure: INSERTION STENT ESOPHAGEAL;  Surgeon: Suhail Cerda MD;  [facility]; Service: Gastroenterology    EYE SURGERY Right 1980    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)     GASTRIC BYPASS  12/19/2016    Lap sleeve gastrectomy w/shunt length shortening procedure    HERNIA REPAIR      HYSTERECTOMY  03/14/2013    IR LUMBAR PUNCTURE  8/29/2018    LAPAROTOMY N/A 1/25/2018    Procedure: Exploratory Laparotomy, wash out,placement of drains, placement of NG feeding tube ; Surgeon: Gina Pinto DO;  [facility];  Service: General    LUMBAR PERITONEAL SHUNT  11/19/2015    Laparoscopic assisted    PA CREATE SHUNT:VENTRIC-PERITONEAL Right 5/31/2017    Procedure: IMAGE GUIDED CORONAL PLACEMENT OF PROGRAMABLE VENTRICULAR-PERITONEAL SHUNT, REMOVAL OF LP SHUNT ;  Surgeon: Junior Browne MD;  [facility];  Service: Neurosurgery    PA EGD TRANSORAL BIOPSY SINGLE/MULTIPLE N/A 6/27/2018    Procedure: ESOPHAGOGASTRODUODENOSCOPY (EGD) with padlock clip placement;  Surgeon: Segundo Dela Cruz MD;  [facility]; Service: Mike Prey CSF SHUNT,W/O REPLACE Right 2/5/2018    Procedure: Removal of  shunt;  Surgeon: Junior Browne MD;  [facility];  Service: Neurosurgery    PA REPLACEMENT/REVISION,CSF SHUNT Right 1/25/2018    Procedure: Externalization of right-sided SHUNT VENTRICULAR-PERITONEAL in anterior chest wall ribs two and three level  ;  Surgeon: Dona Byrne MD;  [facility];  Service: Neurosurgery    WRIST SURGERY Right     x3 2006, 2008         Vitals:    10/04/18 0920   BP: 130/69   Pulse: 63   Resp: 16   Temp: (!) 97 °F (36 1 °C)         Objective:    Physical Exam   Constitutional: She is oriented to person, place, and time  She appears well-developed and well-nourished  HENT:   Head: Normocephalic and atraumatic  Eyes: Pupils are equal, round, and reactive to light   Conjunctivae and EOM are normal  Neurological: She is alert and oriented to person, place, and time  She has normal reflexes  No cranial nerve deficit  Coordination normal    Skin: Skin is warm and dry  Psychiatric: She has a normal mood and affect  Her behavior is normal  Judgment and thought content normal      Neurologic Exam     Mental Status   Oriented to person, place, and time  Cranial Nerves     CN III, IV, VI   Pupils are equal, round, and reactive to light  Extraocular motions are normal        Results/Data:  I have reviewed the results and images from the MRI and MRV in detail with the patient

## 2018-10-04 NOTE — TELEPHONE ENCOUNTER
Complaint/Diagnosis: HX CALCULUS    Insurance:[employer]    History of Cancer:no    Previous Urologist:Dr Nuno Fowler    Outside testing/where:no    If yes,what kind:no    Records requested/where:[facility]    Preferred location:[place]

## 2018-10-10 ENCOUNTER — OFFICE VISIT (OUTPATIENT)
Dept: UROLOGY | Facility: MEDICAL CENTER | Age: 43
End: 2018-10-10
Payer: COMMERCIAL

## 2018-10-10 VITALS
BODY MASS INDEX: 23.39 KG/M2 | DIASTOLIC BLOOD PRESSURE: 70 MMHG | WEIGHT: 132 LBS | SYSTOLIC BLOOD PRESSURE: 120 MMHG | HEIGHT: 63 IN

## 2018-10-10 DIAGNOSIS — N20.0 RENAL STONES: Primary | ICD-10-CM

## 2018-10-10 DIAGNOSIS — G93.2 PSEUDOTUMOR CEREBRI: ICD-10-CM

## 2018-10-10 LAB
SL AMB  POCT GLUCOSE, UA: ABNORMAL
SL AMB LEUKOCYTE ESTERASE,UA: ABNORMAL
SL AMB POCT BILIRUBIN,UA: ABNORMAL
SL AMB POCT BLOOD,UA: ABNORMAL
SL AMB POCT CLARITY,UA: CLEAR
SL AMB POCT COLOR,UA: YELLOW
SL AMB POCT KETONES,UA: ABNORMAL
SL AMB POCT NITRITE,UA: ABNORMAL
SL AMB POCT PH,UA: 6.5
SL AMB POCT SPECIFIC GRAVITY,UA: 1.02
SL AMB POCT URINE PROTEIN: ABNORMAL
SL AMB POCT UROBILINOGEN: 0.2

## 2018-10-10 PROCEDURE — 81003 URINALYSIS AUTO W/O SCOPE: CPT | Performed by: UROLOGY

## 2018-10-10 PROCEDURE — 99204 OFFICE O/P NEW MOD 45 MIN: CPT | Performed by: UROLOGY

## 2018-10-10 NOTE — PROGRESS NOTES
Assessment/Plan:  1  Pseudotumor cerebri-as per neurosurgery and neurology    2  Bilateral renal stones-CT obtained in April of 2018 demonstrates bilateral renal stones in nonobstructive positions  There is no indication for surgical intervention at this point  Current recommendations in regard to Diamox and the formation of renal stones indicates that stone formation and response to Diamox is relatively rare  Recommendations include upper urinary tract imaging especially within the 1st urine half of Diamox treatment  I will plan a CT stone study in 6 months and at the same time a metabolic stone evaluation so that urinary chemistries can be evaluated  The patient is advised to contact this office if she has any pain or symptoms related to stone disease  No problem-specific Assessment & Plan notes found for this encounter  Diagnoses and all orders for this visit:    Renal stones  -     Ambulatory referral to Urology  -     POCT urine dip auto non-scope  -     CT renal stone study abdomen pelvis wo contrast; Future  -     Stone risk profile; Future  -     Uric acid; Future  -     PTH, intact; Future  -     Comprehensive metabolic panel; Future    Pseudotumor cerebri  -     Ambulatory referral to Urology          Subjective:      Patient ID: Andi Mack is a 39 y o  female  Chief complaint:  Renal stones history of present illness: This is a 69-year-old female with a long complicated history  The patient is known to have a pseudotumor cerebri, a history of previous gastric sleeve surgery for weight loss and recent surgery to repair a hiatal hernia because of GERD  During the course of the hernia repair patient apparently suffered and unrecognized gastric laceration with the patient being readmitted to the hospital after being found unresponsive at home  Patient went through multiple procedures in diagnostic tests and at the present time is being treated for her pseudotumor    The patient is being considered for placement of a ventricular shunt for treatment of her he intraventricular pressures however she is trying more conservative medication management using Diamox  Diamox has been implicated in the formation of renal stones and she is referred for comment on this  The patient does have a history of renal stones and renal colic with you past ureteroscopy laser lithotripsy and ureteral stent insertions  She is currently having no stone stone related pain or symptoms        The following portions of the patient's history were reviewed and updated as appropriate: allergies, current medications, past family history, past medical history, past social history, past surgical history and problem list     Review of Systems   Constitutional: Positive for activity change, appetite change and fatigue  HENT: Negative  Eyes: Positive for visual disturbance  Respiratory: Negative  Cardiovascular: Negative  Gastrointestinal: Positive for abdominal pain  Genitourinary: Negative  Musculoskeletal: Negative  Neurological: Positive for headaches  Psychiatric/Behavioral:        Anxiety         Objective:      /70   Ht 5' 3" (1 6 m)   Wt 59 9 kg (132 lb)   LMP  (LMP Unknown)   BMI 23 38 kg/m²          Physical Exam   Constitutional: She is oriented to person, place, and time  She appears well-nourished  HENT:   Head: Atraumatic  Eyes: EOM are normal    Neck: Neck supple  Pulmonary/Chest: Effort normal  No respiratory distress  Abdominal: Soft  Neurological: She is alert and oriented to person, place, and time  Psychiatric: Her behavior is normal  Judgment and thought content normal    anxious   Vitals reviewed

## 2018-10-10 NOTE — LETTER
October 10, 2018     Diann Dickson, [address]    Patient: Jolanta Phelps   YOB: 1975   Date of Visit: 10/10/2018       Dear Dr Juan Carlos Joy: Thank you for referring Samson Alexander to me for evaluation  Below are my notes for this consultation  If you have questions, please do not hesitate to call me  I look forward to following your patient along with you  Sincerely,        Holly Meza MD        CC: MD Kristen Lowery MD Ricka Lamp, MD  10/10/2018  9:32 AM  Sign at close encounter  Assessment/Plan:  1  Pseudotumor cerebri-as per neurosurgery and neurology    2  Bilateral renal stones-CT obtained in April of 2018 demonstrates bilateral renal stones in nonobstructive positions  There is no indication for surgical intervention at this point  Current recommendations in regard to Diamox and the formation of renal stones indicates that stone formation and response to Diamox is relatively rare  Recommendations include upper urinary tract imaging especially within the 1st urine half of Diamox treatment  I will plan a CT stone study in 6 months and at the same time a metabolic stone evaluation so that urinary chemistries can be evaluated  The patient is advised to contact this office if she has any pain or symptoms related to stone disease  No problem-specific Assessment & Plan notes found for this encounter  Diagnoses and all orders for this visit:    Renal stones  -     Ambulatory referral to Urology  -     POCT urine dip auto non-scope  -     CT renal stone study abdomen pelvis wo contrast; Future  -     Stone risk profile; Future  -     Uric acid; Future  -     PTH, intact; Future  -     Comprehensive metabolic panel; Future    Pseudotumor cerebri  -     Ambulatory referral to Urology          Subjective:      Patient ID: Jolanta Phelps is a 39 y o  female      Chief complaint:  Renal stones history of present illness: This is a 44-year-old female with a long complicated history  The patient is known to have a pseudotumor cerebri, a history of previous gastric sleeve surgery for weight loss and recent surgery to repair a hiatal hernia because of GERD  During the course of the hernia repair patient apparently suffered and unrecognized gastric laceration with the patient being readmitted to the hospital after being found unresponsive at home  Patient went through multiple procedures in diagnostic tests and at the present time is being treated for her pseudotumor  The patient is being considered for placement of a ventricular shunt for treatment of her he intraventricular pressures however she is trying more conservative medication management using Diamox  Diamox has been implicated in the formation of renal stones and she is referred for comment on this  The patient does have a history of renal stones and renal colic with you past ureteroscopy laser lithotripsy and ureteral stent insertions  She is currently having no stone stone related pain or symptoms        The following portions of the patient's history were reviewed and updated as appropriate: allergies, current medications, past family history, past medical history, past social history, past surgical history and problem list     Review of Systems   Constitutional: Positive for activity change, appetite change and fatigue  HENT: Negative  Eyes: Positive for visual disturbance  Respiratory: Negative  Cardiovascular: Negative  Gastrointestinal: Positive for abdominal pain  Genitourinary: Negative  Musculoskeletal: Negative  Neurological: Positive for headaches  Psychiatric/Behavioral:        Anxiety         Objective:      /70   Ht 5' 3" (1 6 m)   Wt 59 9 kg (132 lb)   LMP  (LMP Unknown)   BMI 23 38 kg/m²           Physical Exam   Constitutional: She is oriented to person, place, and time  She appears well-nourished     HENT: Head: Atraumatic  Eyes: EOM are normal    Neck: Neck supple  Pulmonary/Chest: Effort normal  No respiratory distress  Abdominal: Soft  Neurological: She is alert and oriented to person, place, and time  Psychiatric: Her behavior is normal  Judgment and thought content normal    anxious   Vitals reviewed

## 2018-10-12 ENCOUNTER — APPOINTMENT (EMERGENCY)
Dept: CT IMAGING | Facility: HOSPITAL | Age: 43
End: 2018-10-12
Payer: COMMERCIAL

## 2018-10-12 ENCOUNTER — HOSPITAL ENCOUNTER (EMERGENCY)
Facility: HOSPITAL | Age: 43
Discharge: HOME | End: 2018-10-13
Attending: EMERGENCY MEDICINE | Admitting: EMERGENCY MEDICINE
Payer: COMMERCIAL

## 2018-10-12 DIAGNOSIS — Q62.11 HYDRONEPHROSIS WITH URETEROPELVIC JUNCTION (UPJ) OBSTRUCTION: ICD-10-CM

## 2018-10-12 DIAGNOSIS — N23 RENAL COLIC ON LEFT SIDE: Primary | ICD-10-CM

## 2018-10-12 LAB
ALBUMIN SERPL BCP-MCNC: 4.6 G/DL (ref 3.5–5.7)
ALP SERPL-CCNC: 92 U/L (ref 40–150)
ALT SERPL W P-5'-P-CCNC: 12 U/L (ref 7–52)
ANION GAP SERPL CALCULATED.3IONS-SCNC: 9 MMOL/L (ref 4–13)
AST SERPL W P-5'-P-CCNC: 16 U/L (ref 13–39)
BACTERIA UR QL AUTO: ABNORMAL /HPF
BASOPHILS # BLD AUTO: 0 THOUSANDS/ΜL (ref 0–0.1)
BASOPHILS NFR BLD AUTO: 1 % (ref 0–1)
BILIRUB SERPL-MCNC: 0.3 MG/DL (ref 0.2–1)
BILIRUB UR QL STRIP: NEGATIVE
BUN SERPL-MCNC: 17 MG/DL (ref 7–25)
CALCIUM SERPL-MCNC: 9.8 MG/DL (ref 8.6–10.5)
CHLORIDE SERPL-SCNC: 100 MMOL/L (ref 98–107)
CLARITY UR: ABNORMAL
CO2 SERPL-SCNC: 27 MMOL/L (ref 21–31)
COLOR UR: YELLOW
CREAT SERPL-MCNC: 0.68 MG/DL (ref 0.6–1.2)
EOSINOPHIL # BLD AUTO: 0.2 THOUSAND/ΜL (ref 0–0.61)
EOSINOPHIL NFR BLD AUTO: 2 % (ref 0–6)
ERYTHROCYTE [DISTWIDTH] IN BLOOD BY AUTOMATED COUNT: 15.3 % (ref 11.6–15.1)
GFR SERPL CREATININE-BSD FRML MDRD: 107 ML/MIN/1.73SQ M
GLUCOSE SERPL-MCNC: 116 MG/DL (ref 65–140)
GLUCOSE UR STRIP-MCNC: NEGATIVE MG/DL
HCT VFR BLD AUTO: 39.9 % (ref 37–47)
HGB BLD-MCNC: 12.9 G/DL (ref 11.5–15.4)
HGB UR QL STRIP.AUTO: ABNORMAL
HOLD SPECIMEN: NORMAL
HOLD SPECIMEN: NORMAL
KETONES UR STRIP-MCNC: NEGATIVE MG/DL
LEUKOCYTE ESTERASE UR QL STRIP: ABNORMAL
LYMPHOCYTES # BLD AUTO: 1.7 THOUSANDS/ΜL (ref 0.6–4.47)
LYMPHOCYTES NFR BLD AUTO: 23 % (ref 14–44)
MCH RBC QN AUTO: 26.6 PG (ref 26.8–34.3)
MCHC RBC AUTO-ENTMCNC: 32.3 G/DL (ref 31.4–37.4)
MCV RBC AUTO: 83 FL (ref 82–98)
MONOCYTES # BLD AUTO: 0.4 THOUSAND/ΜL (ref 0.17–1.22)
MONOCYTES NFR BLD AUTO: 5 % (ref 4–12)
NEUTROPHILS # BLD AUTO: 5 THOUSANDS/ΜL (ref 1.85–7.62)
NEUTS SEG NFR BLD AUTO: 69 % (ref 43–75)
NITRITE UR QL STRIP: NEGATIVE
NON-SQ EPI CELLS URNS QL MICRO: ABNORMAL /HPF
NRBC BLD AUTO-RTO: 0 /100 WBCS
PH UR STRIP.AUTO: 6 [PH] (ref 5–8)
PLATELET # BLD AUTO: 184 THOUSANDS/UL (ref 149–390)
PMV BLD AUTO: 9 FL (ref 8.9–12.7)
POTASSIUM SERPL-SCNC: 3.7 MMOL/L (ref 3.5–5.5)
PROT SERPL-MCNC: 7.3 G/DL (ref 6.4–8.9)
PROT UR STRIP-MCNC: ABNORMAL MG/DL
RBC # BLD AUTO: 4.83 MILLION/UL (ref 3.81–5.12)
RBC #/AREA URNS AUTO: ABNORMAL /HPF
SODIUM SERPL-SCNC: 136 MMOL/L (ref 134–143)
SP GR UR STRIP.AUTO: >=1.03 (ref 1–1.03)
UROBILINOGEN UR QL STRIP.AUTO: 0.2 E.U./DL
WBC # BLD AUTO: 7.3 THOUSAND/UL (ref 4.31–10.16)
WBC #/AREA URNS AUTO: ABNORMAL /HPF

## 2018-10-12 PROCEDURE — 36415 COLL VENOUS BLD VENIPUNCTURE: CPT | Performed by: EMERGENCY MEDICINE

## 2018-10-12 PROCEDURE — 85025 COMPLETE CBC W/AUTO DIFF WBC: CPT | Performed by: EMERGENCY MEDICINE

## 2018-10-12 PROCEDURE — 74176 CT ABD & PELVIS W/O CONTRAST: CPT

## 2018-10-12 PROCEDURE — 99284 EMERGENCY DEPT VISIT MOD MDM: CPT

## 2018-10-12 PROCEDURE — 96375 TX/PRO/DX INJ NEW DRUG ADDON: CPT

## 2018-10-12 PROCEDURE — 81001 URINALYSIS AUTO W/SCOPE: CPT | Performed by: EMERGENCY MEDICINE

## 2018-10-12 PROCEDURE — 96374 THER/PROPH/DIAG INJ IV PUSH: CPT

## 2018-10-12 PROCEDURE — 80053 COMPREHEN METABOLIC PANEL: CPT | Performed by: EMERGENCY MEDICINE

## 2018-10-12 PROCEDURE — 96361 HYDRATE IV INFUSION ADD-ON: CPT

## 2018-10-12 RX ORDER — ONDANSETRON 2 MG/ML
4 INJECTION INTRAMUSCULAR; INTRAVENOUS ONCE
Status: COMPLETED | OUTPATIENT
Start: 2018-10-12 | End: 2018-10-12

## 2018-10-12 RX ORDER — HYDROMORPHONE HCL/PF 1 MG/ML
1 SYRINGE (ML) INJECTION ONCE
Status: COMPLETED | OUTPATIENT
Start: 2018-10-12 | End: 2018-10-12

## 2018-10-12 RX ORDER — SODIUM CHLORIDE 9 MG/ML
250 INJECTION, SOLUTION INTRAVENOUS CONTINUOUS
Status: DISCONTINUED | OUTPATIENT
Start: 2018-10-12 | End: 2018-10-13 | Stop reason: HOSPADM

## 2018-10-12 RX ADMIN — SODIUM CHLORIDE 250 ML/HR: 0.9 INJECTION, SOLUTION INTRAVENOUS at 22:35

## 2018-10-12 RX ADMIN — HYDROMORPHONE HYDROCHLORIDE 1 MG: 1 INJECTION, SOLUTION INTRAMUSCULAR; INTRAVENOUS; SUBCUTANEOUS at 22:36

## 2018-10-12 RX ADMIN — ONDANSETRON 4 MG: 2 INJECTION, SOLUTION INTRAMUSCULAR; INTRAVENOUS at 22:37

## 2018-10-13 VITALS
WEIGHT: 130 LBS | OXYGEN SATURATION: 100 % | TEMPERATURE: 97.6 F | DIASTOLIC BLOOD PRESSURE: 50 MMHG | HEIGHT: 63 IN | SYSTOLIC BLOOD PRESSURE: 114 MMHG | BODY MASS INDEX: 23.04 KG/M2 | HEART RATE: 76 BPM | RESPIRATION RATE: 18 BRPM

## 2018-10-13 PROCEDURE — 96376 TX/PRO/DX INJ SAME DRUG ADON: CPT

## 2018-10-13 RX ORDER — ONDANSETRON 2 MG/ML
4 INJECTION INTRAMUSCULAR; INTRAVENOUS ONCE
Status: COMPLETED | OUTPATIENT
Start: 2018-10-13 | End: 2018-10-13

## 2018-10-13 RX ORDER — HYDROCODONE BITARTRATE AND ACETAMINOPHEN 5; 325 MG/1; MG/1
1 TABLET ORAL ONCE
Status: COMPLETED | OUTPATIENT
Start: 2018-10-13 | End: 2018-10-13

## 2018-10-13 RX ORDER — HYDROCODONE BITARTRATE AND ACETAMINOPHEN 5; 325 MG/1; MG/1
1 TABLET ORAL EVERY 4 HOURS PRN
Qty: 14 TABLET | Refills: 0 | Status: SHIPPED | OUTPATIENT
Start: 2018-10-13 | End: 2019-01-10 | Stop reason: ALTCHOICE

## 2018-10-13 RX ADMIN — ONDANSETRON 4 MG: 2 INJECTION, SOLUTION INTRAMUSCULAR; INTRAVENOUS at 00:43

## 2018-10-13 RX ADMIN — HYDROCODONE BITARTRATE AND ACETAMINOPHEN 1 TABLET: 5; 325 TABLET ORAL at 00:54

## 2018-10-13 NOTE — ED NOTES
ASSUMED CARE, INTRODUCED SELF TO PT  DENIES NEEDS  STATES PAIN AT AN ACCEPTABLE LEVEL  SR UPX2        Destiny Limon RN  10/12/18 6272

## 2018-10-13 NOTE — ED NOTES
CONTINUES TO DENY NEEDS  PT AWARE WE ARE WAITING ON CT RESULTS        Johnnie Ohara RN  10/12/18 9310

## 2018-10-13 NOTE — DISCHARGE INSTRUCTIONS
Renal Colic   WHAT YOU NEED TO KNOW:   Renal colic is severe pain in your lower back or sides  The pain is usually on one side, but may be on both sides of your lower back  Renal colic may start quickly, come and go, and become worse over time  Renal colic is caused by a blockage in your urinary tract  The most common cause of a blockage is a kidney stone  Blood clots, ureter spasms, and dead tissue may also block your urinary tract  DISCHARGE INSTRUCTIONS:   Return to the emergency department if:   · You cannot stop vomiting  · You see new or increased bleeding when you urinate  · You are urinating less than usual, or not at all  · Your pain is not getting better even after treatment  Contact your healthcare provider if:   · You have fever  · You need to urinate more often than usual, or right away  · You see a stone in your urine strainer after you urinate  · You have questions or concerns about your condition or care  Medicines:   · Medicines  may help decrease pain and muscle spasms  You may also need medicine to calm your stomach and stop vomiting  · Take your medicine as directed  Contact your healthcare provider if you think your medicine is not helping or if you have side effects  Tell him of her if you are allergic to any medicine  Keep a list of the medicines, vitamins, and herbs you take  Include the amounts, and when and why you take them  Bring the list or the pill bottles to follow-up visits  Carry your medicine list with you in case of an emergency  Manage your symptoms:   · Drink liquids as directed  to help decrease pain and flush blockages from your urinary tract  Ask how much liquid to drink each day and which liquids are best for you  You may need to drink about 3 liters (12 glasses) of liquids each day  Half of your total daily liquids should be water  Limit coffee, tea, and soda to 2 cups daily  Your urine should be pale and clear      · Strain your urine every time you urinate  Urinate into a strainer (funnel with a fine mesh on the bottom) or glass jar to collect kidney stones  Give the kidney stones to your healthcare provider at your next visit  · Eat a variety of healthy foods  Healthy foods include fruits, vegetables, whole-grain breads, low-fat dairy products, beans, lean meats, and fish  You may need to increase the amount of citrus fruit you eat, such as oranges  Ask your healthcare provider how much salt, calcium, and protein you should eat  · Avoid activity in hot temperatures  Heat may cause you to become dehydrated and urinate less  Follow up with your healthcare provider as directed: You may need to return for tests to check if your blockage has cleared  Write down your questions so you remember to ask them during your visits  © 2017 [address] [employer] Information is for End User's use only and may not be sold, redistributed or otherwise used for commercial purposes  All illustrations and images included in CareNotes® are the copyrighted property of A D [employer] M [employer]  or [employer]  The above information is an  only  It is not intended as medical advice for individual conditions or treatments  Talk to your doctor, nurse or pharmacist before following any medical regimen to see if it is safe and effective for you  Hydronephrosis   WHAT YOU NEED TO KNOW:   Hydronephrosis is swelling in one or both kidneys caused by urine buildup  Urine normally flows from the kidneys to the bladder through tubes called ureters  A blockage in the ureters can prevent urine from flowing properly  Urine flow may also be prevented or slowed if your kidneys do not work correctly  Urine flows back into your urinary tract  Pressure builds up in the kidney and causes swelling  DISCHARGE INSTRUCTIONS:   Medicines: You may  need the following:  · Antibiotics  fight or prevent an infection caused by bacteria       · Take your medicine as directed  Contact your healthcare provider if you think your medicine is not helping or if you have side effects  Tell him or her if you are allergic to any medicine  Keep a list of the medicines, vitamins, and herbs you take  Include the amounts, and when and why you take them  Bring the list or the pill bottles to follow-up visits  Carry your medicine list with you in case of an emergency  Follow up with your urologist, oncologist, or gynecologist as directed:  Write down your questions so you remember to ask them during your visits  Contact your healthcare provider if:   · Your abdomen feels full  · You have a change in how much or how often you urinate  · You urinate more times at night and in larger amounts than during the day  · You have mild lower back pain or pain on one side when you urinate  · You have questions or concerns about your condition or care  Seek care immediately if:   · You have severe, stabbing back pain  · You have blood in your urine  · You cannot urinate, or you urinate very little  © 2017 [address] [facility] Information is for End User's use only and may not be sold, redistributed or otherwise used for commercial purposes  All illustrations and images included in CareNotes® are the copyrighted property of [employer]  or [employer]  The above information is an  only  It is not intended as medical advice for individual conditions or treatments  Talk to your doctor, nurse or pharmacist before following any medical regimen to see if it is safe and effective for you      STRAIN URINE, SAVE STONE

## 2018-10-13 NOTE — ED NOTES
Pt able to exit BR to allow venipuncture for IV / meds, still having severe pain       Simi Solitario, RN  10/12/18 9002

## 2018-10-13 NOTE — ED PROVIDER NOTES
History  Chief Complaint   Patient presents with    Flank Pain     Began last night with pain that subsided spontaneously  Tonight pain returned again with increased intensity  Pain radiates from L flank to LLQ abd, + nausea, -    Abdominal Pain     - vomiting, denies urinary symptoms     HPI      This 41-year-old female is complaining about left flank pain radiating to the left lower quadrant onset around 11 p m  Last night  It abated around 4 a m  However  The symptoms returned this evening shortly before arrival in the emergency department  It is a colicky type of pain in the urine is dark though she has not noted any blood  She has a history of stone disease in the remote past   She can't recall details regarding her last episode of renal colic  She was recently hospitalized for hiatal hernia surgery complicated by bowel perforation and sepsis  She states she still has some residual abdominal pain from the surgery as well   But this flank pain is much more severe  Prior to Admission Medications   Prescriptions Last Dose Informant Patient Reported? Taking?    Calcium Carb-Cholecalciferol (CALCIUM 1000 + D PO) 10/12/2018 at Unknown time Self Yes Yes   Sig: Take 1 capsule by mouth daily   Multiple Vitamin (MULTIVITAMIN) tablet 10/12/2018 at Unknown time Self Yes Yes   Sig: Take 1 tablet by mouth daily   omeprazole (PriLOSEC) 40 MG capsule 10/12/2018 at Unknown time Self Yes Yes   Sig: Take 1 capsule by mouth 2 (two) times a day   patient supplied medication 10/12/2018 at Unknown time  Yes Yes   Sig: Take 1 each by mouth daily bariatric vitamin, patient unsure of medication name      Facility-Administered Medications: None       Past Medical History:   Diagnosis Date    Abdominal pain     Brain condition     Pseudotumor Cerebri     De Quervain's disease (tenosynovitis)     Esophageal perforation     Gastric leak     GERD (gastroesophageal reflux disease)     Idiopathic intracranial hypertension  Migraine     Obesity     Papilledema, both eyes     Postgastrectomy malabsorption     Presence of lumboperitoneal shunt     Resolved: Sep 20, 2017    Rotator cuff tendinitis     Resolved: Aug 23, 2017    Visual field defect        Past Surgical History:   Procedure Laterality Date    CSF SHUNT      LP shunt - 2015 -  shunt - 2017    ESOPHAGOGASTRODUODENOSCOPY N/A 2/23/2018    Procedure: ESOPHAGOGASTRODUODENOSCOPY (EGD) WITH ESOPHAGEAL STENT PLACEMENT;  Surgeon: Satnam Saldana MD;  [facility];  Service: Thoracic    ESOPHAGOGASTRODUODENOSCOPY N/A 2/23/2018    Procedure: ESOPHAGOGASTRODUODENOSCOPY (EGD) WITH REMOVAL ESOPHAGEAL STENT  AND REPLACEMENT WITH 23mm X155mm 1111 [address] [place] [address],[address];  Surgeon: Satnam Saldana MD;  [facility];  Service: Thoracic    ESOPHAGOGASTRODUODENOSCOPY N/A 3/28/2018    Procedure: ESOPHAGOGASTRODUODENOSCOPY (EGD) with PEJ placement ;  Surgeon: Jeffery Hanson MD;  [facility]; Service: Gastroenterology    ESOPHAGOGASTRODUODENOSCOPY N/A 4/5/2018    Procedure: ESOPHAGOGASTRODUODENOSCOPY (EGD) with botox injection and kaofed placement;  Surgeon: Sergo Chen DO;  [facility]; Service: Gastroenterology    ESOPHAGOGASTRODUODENOSCOPY N/A 4/10/2018    Procedure: ESOPHAGOGASTRODUODENOSCOPY (EGD) with Kaofed placement;  Surgeon: Yousif Hill MD;  [facility]; Service: Gastroenterology    ESOPHAGOSCOPY WITH STENT INSERTION N/A 1/24/2018    Procedure: INSERTION STENT ESOPHAGEAL;  Surgeon: Shane Gregorio MD;  [facility];   Service: Gastroenterology    EYE SURGERY Right 1980    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)     GASTRIC BYPASS  12/19/2016    Lap sleeve gastrectomy w/shunt length shortening procedure    HERNIA REPAIR      HYSTERECTOMY  03/14/2013    IR LUMBAR PUNCTURE  8/29/2018    LAPAROTOMY N/A 1/25/2018    Procedure: Exploratory Laparotomy, wash out,placement of drains, placement of NG feeding tube ;  Surgeon: Gina Pinto DO;  [facility];  Service: General    LUMBAR PERITONEAL SHUNT  11/19/2015    Laparoscopic assisted    MD CREATE SHUNT:VENTRIC-PERITONEAL Right 5/31/2017    Procedure: IMAGE GUIDED CORONAL PLACEMENT OF PROGRAMABLE VENTRICULAR-PERITONEAL SHUNT, REMOVAL OF LP SHUNT ;  Surgeon: Junior Browne MD;  [facility];  Service: Neurosurgery    MD EGD TRANSORAL BIOPSY SINGLE/MULTIPLE N/A 6/27/2018    Procedure: ESOPHAGOGASTRODUODENOSCOPY (EGD) with padlock clip placement;  Surgeon: Segundo Dela Cruz MD;  [facility]; Service: Mike Prey CSF SHUNT,W/O REPLACE Right 2/5/2018    Procedure: Removal of  shunt;  Surgeon: Junior Browne MD;  [facility];  Service: Neurosurgery    MD REPLACEMENT/REVISION,CSF SHUNT Right 1/25/2018    Procedure: Externalization of right-sided SHUNT VENTRICULAR-PERITONEAL in anterior chest wall ribs two and three level  ;  Surgeon: Dona Byrne MD;  [facility];  Service: Neurosurgery    WRIST SURGERY Right     x3 2006, 2008       Family History   Problem Relation Age of Onset    No Known Problems Mother     No Known Problems Father     Thyroid cancer Brother     Colon cancer Maternal Grandfather     Cancer Paternal Grandmother     Cancer Paternal Grandfather     Cancer Family         Gastric    Leukemia Family     Colon cancer Family     Pancreatic cancer Family      I have reviewed and agree with the history as documented  Social History   Substance Use Topics    Smoking status: Former Smoker     Packs/day: 0 50     Years: 20 00     Quit date: 8/24/2012    Smokeless tobacco: Never Used      Comment: Per [employer]: Never a smoker    Alcohol use No        Review of Systems   Constitutional: Positive for activity change, appetite change and diaphoresis  Negative for chills and unexpected weight change  HENT: Negative for congestion, ear pain, rhinorrhea and sore throat      Eyes: Negative for discharge and visual disturbance  Respiratory: Negative for cough, chest tightness, shortness of breath and wheezing  Cardiovascular: Negative for chest pain, palpitations and leg swelling  Gastrointestinal: Positive for abdominal pain, nausea and vomiting  Negative for diarrhea  Pain is in the left flank and radiates to the left lower quadrant  It appears to be colicky pain   Endocrine: Negative for polydipsia and polyuria  Genitourinary: Negative for dysuria, frequency and hematuria  Pain is in the left flank and radiates to the left lower quadrant  It appears to be colicky pain  She has a history of kidney stones but she is having trouble recalling details due to the intensity of the pain at the moment  The patient had a hysterectomy about 6 years ago for fibroids  No history of cancer  Musculoskeletal: Negative for arthralgias, back pain, gait problem and myalgias  Skin: Negative for color change, pallor and rash  Neurological: Negative for dizziness, syncope, weakness, numbness and headaches  Hematological: Does not bruise/bleed easily  Psychiatric/Behavioral: Negative for agitation and dysphoric mood  The patient is nervous/anxious  Physical Exam  Physical Exam   Constitutional: She is oriented to person, place, and time  She appears well-developed and well-nourished  She appears distressed  The patient is holding the left side of her abdomen her left flank and appears to be in moderate to severe pain  She is slightly diaphoretic  HENT:   Head: Normocephalic and atraumatic  Right Ear: External ear normal    Left Ear: External ear normal    Mouth/Throat: Oropharynx is clear and moist  No oropharyngeal exudate  Eyes: Conjunctivae are normal  Right eye exhibits no discharge  Left eye exhibits no discharge  No scleral icterus  Neck: Normal range of motion  Neck supple  No JVD present  No thyromegaly present     Cardiovascular: Normal rate, regular rhythm and normal heart sounds  Exam reveals no gallop  No murmur heard  Pulmonary/Chest: Effort normal and breath sounds normal  She has no wheezes  She has no rales  Abdominal: Soft  She exhibits no mass  There is tenderness  There is guarding  Bowel sounds are diminished but present  She is soft but diffusely tender but much more so in the left mid abdomen  There is some questionable guarding left mid abdomen and left lower quadrant but no definite rebound  There is significant CVA tenderness on the left as well  Musculoskeletal: Normal range of motion  She exhibits no edema, tenderness or deformity  Lymphadenopathy:     She has no cervical adenopathy  Neurological: She is alert and oriented to person, place, and time  No sensory deficit  Speech clear face symmetrical grossly symmetrical and unremarkable muscle tone and strength   Skin: Skin is warm  Capillary refill takes less than 2 seconds  No rash noted  She is diaphoretic  No erythema  No pallor  Slightly diaphoretic   Psychiatric: Her behavior is normal  Thought content normal    The patient appears anxious and is in   Apparent pain  Nursing note and vitals reviewed        Vital Signs  ED Triage Vitals [10/12/18 2202]   Temperature Pulse Respirations Blood Pressure SpO2   98 6 °F (37 °C) 85 20 147/69 100 %      Temp Source Heart Rate Source Patient Position - Orthostatic VS BP Location FiO2 (%)   Tympanic Monitor Lying Left arm --      Pain Score       8           Vitals:    10/12/18 2202 10/12/18 2252 10/12/18 2349 10/13/18 0100   BP: 147/69 (!) 99/21 (!) 104/49 114/50   Pulse: 85 56  76   Patient Position - Orthostatic VS: Lying Lying         Visual Acuity      ED Medications  Medications   ondansetron (ZOFRAN) injection 4 mg (4 mg Intravenous Given 10/12/18 2237)   HYDROmorphone (DILAUDID) injection 1 mg (1 mg Intravenous Given 10/12/18 2236)   ondansetron (ZOFRAN) injection 4 mg (4 mg Intravenous Given 10/13/18 0043) HYDROcodone-acetaminophen (NORCO) 5-325 mg per tablet 1 tablet (1 tablet Oral Given 10/13/18 0054)       Diagnostic Studies  Results Reviewed     Procedure Component Value Units Date/Time    Comprehensive metabolic panel [[id number]] Collected:  10/12/18 2245    Lab Status:  Final result Specimen:  Blood from Arm, Right Updated:  10/12/18 2324     Sodium 136 mmol/L      Potassium 3 7 mmol/L      Chloride 100 mmol/L      CO2 27 mmol/L      ANION GAP 9 mmol/L      BUN 17 mg/dL      Creatinine 0 68 mg/dL      Glucose 116 mg/dL      Calcium 9 8 mg/dL      AST 16 U/L      ALT 12 U/L      Alkaline Phosphatase 92 U/L      Total Protein 7 3 g/dL      Albumin 4 6 g/dL      Total Bilirubin 0 30 mg/dL      eGFR 107 ml/min/1 73sq m     Narrative:         National Kidney Disease Education Program recommendations are as follows:  GFR calculation is accurate only with a steady state creatinine  Chronic Kidney disease less than 60 ml/min/1 73 sq  meters  Kidney failure less than 15 ml/min/1 73 sq  meters  Urine Microscopic [[id number]]  (Abnormal) Collected:  10/12/18 2244    Lab Status:  Final result Specimen:  Urine from Urine, Clean Catch Updated:  10/12/18 2304     RBC, UA Innumerable (A) /hpf      WBC, UA 2-4 (A) /hpf      Epithelial Cells Occasional /hpf      Bacteria, UA Occasional /hpf     [place] draw [[id number]] Collected:  10/12/18 2244    Lab Status:  Final result Specimen:  Blood Updated:  10/12/18 2301    Narrative: The following orders were created for panel order [place] draw    Procedure                               Abnormality         Status                     ---------                               -----------         ------                     [place] Top on IMWR[[id number]]                            Final result               Green / Yellow tube on EHDQ[[id number]]                       Final result               Green / Black tube on ZCGY[[id number]]                        Final result Please view results for these tests on the individual orders  UA w Reflex to Microscopic w Reflex to Culture [[id number]]  (Abnormal) Collected:  10/12/18 2244    Lab Status:  Final result Specimen:  Urine from Urine, Clean Catch Updated:  10/12/18 2300     Color, UA Yellow     Clarity, UA Slightly Cloudy (A)     Specific Gravity, UA >=1 030     pH, UA 6 0     Leukocytes, UA Trace (A)     Nitrite, UA Negative     Protein, UA Trace (A) mg/dl      Glucose, UA Negative mg/dl      Ketones, UA Negative mg/dl      Urobilinogen, UA 0 2 E U /dl      Bilirubin, UA Negative     Blood, UA 3+ (A)    CBC and differential [[id number]]  (Abnormal) Collected:  10/12/18 2245    Lab Status:  Final result Specimen:  Blood from Arm, Right Updated:  10/12/18 2300     WBC 7 30 Thousand/uL      RBC 4 83 Million/uL      Hemoglobin 12 9 g/dL      Hematocrit 39 9 %      MCV 83 fL      MCH 26 6 (L) pg      MCHC 32 3 g/dL      RDW 15 3 (H) %      MPV 9 0 fL      Platelets 591 Thousands/uL      nRBC 0 /100 WBCs      Neutrophils Relative 69 %      Lymphocytes Relative 23 %      Monocytes Relative 5 %      Eosinophils Relative 2 %      Basophils Relative 1 %      Neutrophils Absolute 5 00 Thousands/µL      Lymphocytes Absolute 1 70 Thousands/µL      Monocytes Absolute 0 40 Thousand/µL      Eosinophils Absolute 0 20 Thousand/µL      Basophils Absolute 0 00 Thousands/µL                  CT renal stone study abdomen pelvis wo contrast   Final Result by Braydon Lopez (10/12 3567)   1  Bilateral nonobstructive renal calculi are seen  2   Moderate left-sided hydroureteronephrosis is identified with a   possible distal left ureteral stone  3   Status post gastric bypass procedure  Residual hiatal hernia is seen  Signed by Chichi Tai MD                 Procedures  Procedures       Phone Contacts  ED Phone Contact    ED Course  ED Course as of Oct 13 0612   Sat Oct 13, 2018   0036 Status post repeat evaluation patient    She is feeling much better the dose of Dilaudid IV almost completely limited the pain except for small amount in the left lower quadrant  She has remained pretty much asymptomatic  CT scan shows left moderate hydronephrosis and a 3 mm UVJ stone  Laboratory evaluation is consistent with renal colic as well  Patient states she recently met with Dr Chelsie Adan  her urologist in consultation for the possible use of Diamox for her pseudotumor cerebri  Patient is feeling well enough and requested to go home  Additional Zofran will be given IV and a dose of Vicodin to help her through the night if needed  MDM  CritCare Time    Disposition  Final diagnoses:   Renal colic on left side   Hydronephrosis with ureteropelvic junction (UPJ) obstruction     Time reflects when diagnosis was documented in both MDM as applicable and the Disposition within this note     Time User Action Codes Description Comment    10/13/2018 12:40 AM Eduard Montalvo Add [Y05] Renal colic on left side     10/13/2018 12:41 AM Florentin Juarez Add [Q62 11] Hydronephrosis with ureteropelvic junction (UPJ) obstruction       ED Disposition     ED Disposition Condition Comment    Discharge  Shweta Nolan discharge to home/self care      Condition at discharge: Stable        Follow-up Information     Follow up With Specialties Details Why Dirk Jarvis MD Urology In 3 days Return to ED sooner if needed  [address] [place] [address]  [phone]            Discharge Medication List as of 10/13/2018 12:47 AM      START taking these medications    Details   HYDROcodone-acetaminophen (NORCO) 5-325 mg per tablet Take 1 tablet by mouth every 4 (four) hours as needed for pain for up to 14 doses Max Daily Amount: 6 tablets, Starting Sat 10/13/2018, Print         CONTINUE these medications which have NOT CHANGED    Details   Calcium Carb-Cholecalciferol (CALCIUM 1000 + D PO) Take 1 capsule by mouth daily, Historical Med      Multiple Vitamin (MULTIVITAMIN) tablet Take 1 tablet by mouth daily, Historical Med      omeprazole (PriLOSEC) 40 MG capsule Take 1 capsule by mouth 2 (two) times a day, Starting Tue 8/7/2018, Historical Med      patient supplied medication Take 1 each by mouth daily bariatric vitamin, patient unsure of medication name, Historical Med           No discharge procedures on file      ED Provider  Electronically Signed by           Jerman Manzanares DO  10/13/18 6535

## 2018-10-19 ENCOUNTER — TELEPHONE (OUTPATIENT)
Dept: NEUROSURGERY | Facility: CLINIC | Age: 43
End: 2018-10-19

## 2018-10-19 NOTE — TELEPHONE ENCOUNTER
Patient LM on nurse line requesting a call back  She states that she saw her Urologist and ophthalmologist and she is okay to start the diamox  Is this something that we will be prescribing for her? She said that she can call the eye dr to have a one month f/u scheduled after she starts taking the diamox as well

## 2018-10-20 DIAGNOSIS — G93.2 PSEUDOTUMOR CEREBRI: Primary | ICD-10-CM

## 2018-10-20 RX ORDER — ACETAZOLAMIDE 250 MG/1
500 TABLET ORAL 2 TIMES DAILY
Qty: 120 TABLET | Refills: 1 | Status: SHIPPED | OUTPATIENT
Start: 2018-10-20 | End: 2019-08-05

## 2018-10-20 NOTE — PROGRESS NOTES
Plan is to initiate Diamox therapy  Labs within one month of initiation  Eye exam after on therapy for 1 month

## 2018-10-31 ENCOUNTER — TELEPHONE (OUTPATIENT)
Dept: NEUROSURGERY | Facility: CLINIC | Age: 43
End: 2018-10-31

## 2018-10-31 NOTE — TELEPHONE ENCOUNTER
Pt reports starting Diamox on Fri 10/27  Since starting she has been experiencing tingling in her fingers, toes, and lips, as well as frequent vomiting  Unable to keep anything down  Discussed with Ed M and suggested she stop taking at this time  Please advise  Thank You

## 2018-12-13 ENCOUNTER — OFFICE VISIT (OUTPATIENT)
Dept: NEUROSURGERY | Facility: CLINIC | Age: 43
End: 2018-12-13
Payer: COMMERCIAL

## 2018-12-13 VITALS
WEIGHT: 130 LBS | SYSTOLIC BLOOD PRESSURE: 121 MMHG | HEART RATE: 63 BPM | BODY MASS INDEX: 23.04 KG/M2 | TEMPERATURE: 97.9 F | HEIGHT: 63 IN | RESPIRATION RATE: 16 BRPM | DIASTOLIC BLOOD PRESSURE: 65 MMHG

## 2018-12-13 DIAGNOSIS — G93.2 PSEUDOTUMOR CEREBRI SYNDROME: Primary | ICD-10-CM

## 2018-12-13 PROCEDURE — 99213 OFFICE O/P EST LOW 20 MIN: CPT | Performed by: NEUROLOGICAL SURGERY

## 2018-12-13 NOTE — PROGRESS NOTES
Neurosurgery Office Note  Theresa Galvan is a 39 y o  female    Type of Visit: Follow-up        Diagnoses and all orders for this visit:    Pseudotumor cerebri syndrome  -     MRI brain with contrast; Future          DISCUSSION SUMMARY  This is a 76-year-old female who carries a diagnosis of pseudotumor cerebri  Of course this was originally treated with a lumbar peritoneal shunt  This was pulling on lumbar nerve roots and worked intermittently and is for these reasons it had to be removed  A ventriculoperitoneal shunt was placed and was working well  Unfortunately following abdominal surgery complicated by gastric perforation and peritonitis, it was necessary to externalize and later remove the ventriculoperitoneal shunt  She now returns for definitive treatment of her headache syndrome and review of her MRI  She has not had her ophthalmologic visit as of this visit  She was tried on Diamox however has developed a renal calculus and is for these reasons the diuretic was stopped  Her current symptoms include a pressure-like headache but without noted visual changes  Our plan will be to observe closely the progression of her symptoms and to review her ophthalmological evaluation prior to proceeding  The potential remedies for her to condition include endovascular techniques versus the placement of a ventricular atrial shunt  I do not believe that a repeat trial on diuretics would be appropriate  CHIEF COMPLAINT  Patient presents for follow up to discuss shunt options as suggested by Dr Veronika Seaman  [facility] NEUROSURGERY PROCEDURES  5/31/17 (DKO) IMAGE GUIDED CORONAL PLACEMENT OF PROGRAMABLE VENTRICULAR-PERITONEAL SHUNT, REMOVAL OF LP SHUNT (Right Head)    1/25/18 (HOD / Dr Connie Malone)  Externalization of right-sided SHUNT VENTRICULAR-PERITONEAL in anterior chest wall ribs two and three level   (Right Chest);  Exploratory Laparotomy, wash out,placement of drains, placement of NG feeding tube  (N/A Abdomen)    2/5/18 (DKO) Removal of  shunt (Right Head)      HISTORY OF PRESENT ILLNESS  She has intermittent pressure-like headaches but without changes in her vision  She has recently started to eat and had her G-tube removed  She is thin  Her sleep-wake cycles have begun to return to normal now  She did have an episode on Diamox where a renal calculus developed  She passed the spontaneously without treatment  She has also evaluated by Urology in detail  She has had no changes in her bowel or bladder habits  She has no long-lasting headaches although I have recommended that she follow up again with Dr Helga Ramirez and she has not done this on her own recently  She denies any current radiculopathy  She has not fallen and denies any balance difficulty problems  She does report changes in her dizziness should she stand up quickly  Affect on 1 occasion when she did stand up quickly she lost her footing on flight of stairs  She sometimes has difficulty even when standing for an extended period of time  She does occasionally still have abdominal pain  She has a vague complaint of something floating in her head from time to time  She does not lose orientation and has not lost consciousness  She has had no seizure or seizure-like activity  REVIEW OF SYSTEMS  Review of Systems   Constitutional: Negative  HENT: Negative  Eyes: Negative  Respiratory: Negative  Cardiovascular: Negative  Gastrointestinal: Negative  Endocrine: Negative  Genitourinary: Negative  Musculoskeletal: Negative  Skin: Negative  Allergic/Immunologic: Negative  Neurological: Positive for headaches (pressure all over her head)  She feels like something is floating in her head   Hematological: Negative  Psychiatric/Behavioral: Negative  All other systems reviewed and are negative  The patient's ROS was reviewed by MD   I reviewed the ROS      Active Ambulatory Problems     Diagnosis Date Noted    Pseudotumor cerebri 05/31/2017    S/P  shunt 05/31/2017    Occipital headache 05/31/2017    Brain condition     Gastric leak 01/24/2018    Acute peritonitis 01/24/2018    Pseudotumor cerebri syndrome 01/24/2018    S/P  shunt 01/24/2018    Infection of  (ventriculoperitoneal) shunt, subsequent encounter 01/24/2018    Murmur, cardiac 01/26/2018    Refusal of blood product 01/31/2018    Gastroesophageal reflux disease 12/15/2017    Choroidal nevus, right eye 02/03/2018    Moderate protein-calorie malnutrition ([address] ) 02/21/2018    Gastric perforation ([address] ) 01/24/2018    Postoperative intra-abdominal abscess 03/05/2018    Abdominal wound dehiscence 83/08/3450    Neutrophilic leukocytosis 95/98/2431    Left-sided chest wall pain 03/06/2018    Bariatric surgery status 06/22/2018    Pre-op exam 08/24/2018    Refusal of influenza vaccine by provider 09/28/2018    Renal stones 10/04/2018     Resolved Ambulatory Problems     Diagnosis Date Noted    Peritonitis 01/24/2018    Adjustment disorder with depressed mood 02/08/2018     Past Medical History:   Diagnosis Date    Abdominal pain     Brain condition     De Quervain's disease (tenosynovitis)     Esophageal perforation     Gastric leak     GERD (gastroesophageal reflux disease)     Idiopathic intracranial hypertension     Migraine     Obesity     Papilledema, both eyes     Postgastrectomy malabsorption     Presence of lumboperitoneal shunt     Rotator cuff tendinitis     Visual field defect        Past Surgical History:   Procedure Laterality Date    CSF SHUNT      LP shunt - 2015 -  shunt - 2017    ESOPHAGOGASTRODUODENOSCOPY N/A 2/23/2018    Procedure: ESOPHAGOGASTRODUODENOSCOPY (EGD) WITH ESOPHAGEAL STENT PLACEMENT;  Surgeon: Malgorzata Sanderson MD;  [facility];  Service: Thoracic    ESOPHAGOGASTRODUODENOSCOPY N/A 2/23/2018    Procedure: ESOPHAGOGASTRODUODENOSCOPY (EGD) WITH REMOVAL ESOPHAGEAL STENT  AND REPLACEMENT WITH 23mm X155mm [address] [place] [address],[address];  Surgeon: Vlad Lundy MD;  [facility];  Service: Thoracic    ESOPHAGOGASTRODUODENOSCOPY N/A 3/28/2018    Procedure: ESOPHAGOGASTRODUODENOSCOPY (EGD) with PEJ placement ;  Surgeon: Tammie Perera MD;  [facility]; Service: Gastroenterology    ESOPHAGOGASTRODUODENOSCOPY N/A 4/5/2018    Procedure: ESOPHAGOGASTRODUODENOSCOPY (EGD) with botox injection and kaofed placement;  Surgeon: Beatrice Obrien DO;  [facility]; Service: Gastroenterology    ESOPHAGOGASTRODUODENOSCOPY N/A 4/10/2018    Procedure: ESOPHAGOGASTRODUODENOSCOPY (EGD) with Kaofed placement;  Surgeon: Fang Williamson MD;  [facility]; Service: Gastroenterology    ESOPHAGOSCOPY WITH STENT INSERTION N/A 1/24/2018    Procedure: INSERTION STENT ESOPHAGEAL;  Surgeon: Edita Pfeiffer MD;  [facility]; Service: Gastroenterology    EYE SURGERY Right 1980    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)     GASTRIC BYPASS  12/19/2016    Lap sleeve gastrectomy w/shunt length shortening procedure    HERNIA REPAIR      HYSTERECTOMY  03/14/2013    IR LUMBAR PUNCTURE  8/29/2018    LAPAROTOMY N/A 1/25/2018    Procedure: Exploratory Laparotomy, wash out,placement of drains, placement of NG feeding tube ; Surgeon: Ioana Lee DO;  [facility];  Service: General    LUMBAR PERITONEAL SHUNT  11/19/2015    Laparoscopic assisted    TN CREATE SHUNT:VENTRIC-PERITONEAL Right 5/31/2017    Procedure: IMAGE GUIDED CORONAL PLACEMENT OF PROGRAMABLE VENTRICULAR-PERITONEAL SHUNT, REMOVAL OF LP SHUNT ;  Surgeon: Hitesh Gu MD;  [facility];  Service: Neurosurgery    TN EGD TRANSORAL BIOPSY SINGLE/MULTIPLE N/A 6/27/2018    Procedure: ESOPHAGOGASTRODUODENOSCOPY (EGD) with padlock clip placement;  Surgeon: Jose Tsang MD;  [facility];   Service: Barry Pikes CSF SHUNT,W/O REPLACE Right 2/5/2018    Procedure: Removal of  shunt;  Surgeon: Roda Eisenmenger, MD;  [facility];  Service: Neurosurgery    AL REPLACEMENT/REVISION,CSF SHUNT Right 1/25/2018    Procedure: Externalization of right-sided SHUNT VENTRICULAR-PERITONEAL in anterior chest wall ribs two and three level  ;  Surgeon: Yonathan Coley MD;  [facility];  Service: Neurosurgery    WRIST SURGERY Right     x3 2006, 2008       History   Smoking Status    Former Smoker    Packs/day: 0 50    Years: 20 00    Quit date: 8/24/2012   Smokeless Tobacco    Never Used     Comment: Per [employer]: Never a smoker       History   Alcohol Use No       History   Drug Use No       Vitals:    12/13/18 1112   BP: 121/65   BP Location: Right arm   Patient Position: Sitting   Cuff Size: Adult   Pulse: 63   Resp: 16   Temp: 97 9 °F (36 6 °C)   TempSrc: Tympanic   Weight: 59 kg (130 lb)   Height: 5' 3" (1 6 m)         Current Outpatient Prescriptions:     Calcium Carb-Cholecalciferol (CALCIUM 1000 + D PO), Take 1 capsule by mouth daily, Disp: , Rfl:     HYDROcodone-acetaminophen (NORCO) 5-325 mg per tablet, Take 1 tablet by mouth every 4 (four) hours as needed for pain for up to 14 doses Max Daily Amount: 6 tablets, Disp: 14 tablet, Rfl: 0    Multiple Vitamin (MULTIVITAMIN) tablet, Take 1 tablet by mouth daily, Disp: , Rfl:     omeprazole (PriLOSEC) 40 MG capsule, Take 1 capsule by mouth 2 (two) times a day, Disp: , Rfl:     patient supplied medication, Take 1 each by mouth daily bariatric vitamin, patient unsure of medication name, Disp: , Rfl:     acetaZOLAMIDE (DIAMOX) 250 mg tablet, Take 2 tablets (500 mg total) by mouth 2 (two) times a day for 30 days, Disp: 120 tablet, Rfl: 1    The following portions of the patient's history were reviewed by MD and updated by MA as appropriate: allergies, current medications, past family history, past medical history, past social history, past surgical history and problem list       Physical Exam   Constitutional: She is oriented to person, place, and time  She appears well-developed  HENT:   Head: Normocephalic  Eyes: Pupils are equal, round, and reactive to light  EOM are normal    Neck: Normal range of motion  Abdominal: Soft  Musculoskeletal: Normal range of motion  Right shoulder: She exhibits normal range of motion and normal strength  Neurological: She is alert and oriented to person, place, and time  She has normal strength  No sensory deficit  Coordination and gait normal  GCS eye subscore is 4  GCS verbal subscore is 5  GCS motor subscore is 6  She is able to heel walk toe-walk perform tandem gait   Psychiatric: She has a normal mood and affect  RESULTS/DATA  MRI of the brain is carefully reviewed and compared with previous studies  There is no ventricular dilatation  The sulci at the cortical hemispheres appears to be intact    The 3rd ventricular contour is normal   The portion of normality of size of the 3rd to 4th is equal

## 2019-01-04 DIAGNOSIS — E66.01 MORBID (SEVERE) OBESITY DUE TO EXCESS CALORIES (HCC): Primary | ICD-10-CM

## 2019-01-04 NOTE — TELEPHONE ENCOUNTER
Pt called asking for refill omeprazole 40mg 1 cap 2 times daily   Pt states she uses [facility] in [place]     States she will be out of medication today, forgot to take her night time dose and still gets throat burning     CB# [phone]  Next appt 9/6/2019 with Betito Claire

## 2019-01-07 RX ORDER — OMEPRAZOLE 40 MG/1
40 CAPSULE, DELAYED RELEASE ORAL 2 TIMES DAILY
Qty: 180 CAPSULE | Refills: 1 | Status: SHIPPED | OUTPATIENT
Start: 2019-01-07 | End: 2019-08-12 | Stop reason: SDUPTHER

## 2019-01-10 ENCOUNTER — OFFICE VISIT (OUTPATIENT)
Dept: INTERNAL MEDICINE CLINIC | Facility: CLINIC | Age: 44
End: 2019-01-10
Payer: COMMERCIAL

## 2019-01-10 VITALS
OXYGEN SATURATION: 99 % | HEART RATE: 69 BPM | BODY MASS INDEX: 24.03 KG/M2 | HEIGHT: 63 IN | TEMPERATURE: 98.2 F | WEIGHT: 135.6 LBS | SYSTOLIC BLOOD PRESSURE: 124 MMHG | DIASTOLIC BLOOD PRESSURE: 80 MMHG

## 2019-01-10 DIAGNOSIS — Z12.39 SCREENING FOR BREAST CANCER: Primary | ICD-10-CM

## 2019-01-10 DIAGNOSIS — F32.A DEPRESSION, UNSPECIFIED DEPRESSION TYPE: ICD-10-CM

## 2019-01-10 DIAGNOSIS — T85.730D INFECTION OF VP (VENTRICULOPERITONEAL) SHUNT, SUBSEQUENT ENCOUNTER: ICD-10-CM

## 2019-01-10 DIAGNOSIS — R19.7 DIARRHEA, UNSPECIFIED TYPE: ICD-10-CM

## 2019-01-10 PROBLEM — Z98.2 S/P VP SHUNT: Status: RESOLVED | Noted: 2017-05-31 | Resolved: 2019-01-10

## 2019-01-10 PROCEDURE — 99214 OFFICE O/P EST MOD 30 MIN: CPT | Performed by: NURSE PRACTITIONER

## 2019-01-10 NOTE — ASSESSMENT & PLAN NOTE
Patient complicated past medical history, will give patient referral to Psychology, if patient feels with Psychology may need to consider anti depressive

## 2019-01-10 NOTE — ASSESSMENT & PLAN NOTE
Patient has complicated past medical history with multiple gastric complications  I did advise patient to follow-up with gastric surgeon as her symptoms seem to correlate to her surgeries  Patient currently does not appear to be in urgent distress  Advise patient to continue to stay well hydrated  Discussed red flag warning signs with patient and when she should report back to our office

## 2019-01-10 NOTE — PROGRESS NOTES
Assessment/Plan:    Diarrhea  Patient has complicated past medical history with multiple gastric complications  I did advise patient to follow-up with gastric surgeon as her symptoms seem to correlate to her surgeries  Patient currently does not appear to be in urgent distress  Advise patient to continue to stay well hydrated  Discussed red flag warning signs with patient and when she should report back to our office  Depression  Patient complicated past medical history, will give patient referral to Psychology, if patient feels with Psychology may need to consider anti depressive  Infection of  (ventriculoperitoneal) shunt, subsequent encounter  Patient currently follows Neurology and was last seen on 12/13/19, patient had reported similar symptoms at that time, however she states that they have worsened slightly  Did advise patient to follow-up with her neurologist          Diagnoses and all orders for this visit:    Screening for breast cancer  -     Mammo screening bilateral w cad; Future    Depression, unspecified depression type  -     Ambulatory referral to Psychology; Future    Diarrhea, unspecified type    Infection of  (ventriculoperitoneal) shunt, subsequent encounter          Subjective:      Patient ID: Ericka Mcconnell is a 39 y o  female  Patient presents today for her routine follow-up  Patient has a complicated past medical history of hernia repair and underwent several complications such as peritoneal abscess acute peritonitis and sepsis which required life support, due to an ache in her stomach and leakage of fluid  Patient had underwent acute rehab and had an NG tube placed for feeding and was very malnourished  Patient states that she currently has diarrhea at least twice a week which last all day long, and then she states she feels constipated  Patient denies any recent travel and denies recent antibiotics  Patient states that the diarrhea typically resolves on its own  Patient denies any changes in her skin color, no jaundice no vomiting and no nausea  Patient denies symptoms of reflux, she states that her abdomen does hurt where she had her ADENIKE drains  Patient states she is currently tolerating her bariatric diet and has been taking a bariatric vitamins as directed  Patient also follows neurology quite closely patient had  shunt removed due to her complication of abdominal surgery  Patient states that since her  shunt has been removed, she gets zingers through her head approximately 4 times a week, she feels she has a lot of pressure, she states that these symptoms are present at her most recent neurological appointment which was in December of this year however she states that these zingers come more often at this point in time  Patient states that the symptoms resolved when she lays down  Patient continues to follow-up with her ophthalmologist, and was seen 3 weeks ago, patient will be returning within 3 months, patient [facility]  Patient states that she has been crying a lot recently, and she feels that she is happy however she feels that she has went through a lot and is not managing it very well  Patient denies suicidal intentions at this point in time  The following portions of the patient's history were reviewed and updated as appropriate: allergies, current medications, past family history, past medical history, past social history, past surgical history and problem list     Review of Systems   Constitutional: Negative for activity change, appetite change, chills, diaphoresis and fever  HENT: Negative for congestion, ear discharge, ear pain, postnasal drip, rhinorrhea, sinus pain, sinus pressure and sore throat  Eyes: Negative for pain, discharge, itching and visual disturbance  Respiratory: Negative for cough, chest tightness, shortness of breath and wheezing      Cardiovascular: Negative for chest pain, palpitations and leg swelling  Gastrointestinal: Positive for abdominal pain and diarrhea  Negative for constipation, nausea and vomiting  Endocrine: Negative for polydipsia, polyphagia and polyuria  Genitourinary: Negative for difficulty urinating, dysuria and urgency  Musculoskeletal: Negative for arthralgias, back pain and neck pain  Skin: Negative for rash and wound  Neurological: Positive for headaches  Negative for dizziness, weakness and numbness  Psychiatric/Behavioral: Negative for self-injury and suicidal ideas           Past Medical History:   Diagnosis Date    Abdominal pain     Brain condition     Pseudotumor Cerebri     Chronic kidney disease     De Quervain's disease (tenosynovitis)     Depression 1/10/2019    Esophageal perforation     Gastric leak     GERD (gastroesophageal reflux disease)     Idiopathic intracranial hypertension     Kidney stone     Migraine     Obesity     Papilledema, both eyes     Postgastrectomy malabsorption     Presence of lumboperitoneal shunt     Resolved: Sep 20, 2017    Rotator cuff tendinitis     Resolved: Aug 23, 2017    Visual field defect          Current Outpatient Prescriptions:     Calcium Carb-Cholecalciferol (CALCIUM 1000 + D PO), Take 1 capsule by mouth daily, Disp: , Rfl:     Multiple Vitamin (MULTIVITAMIN) tablet, Take 1 tablet by mouth daily, Disp: , Rfl:     omeprazole (PriLOSEC) 40 MG capsule, Take 1 capsule (40 mg total) by mouth 2 (two) times a day for 90 days, Disp: 180 capsule, Rfl: 1    patient supplied medication, Take 1 each by mouth daily bariatric vitamin, patient unsure of medication name, Disp: , Rfl:     acetaZOLAMIDE (DIAMOX) 250 mg tablet, Take 2 tablets (500 mg total) by mouth 2 (two) times a day for 30 days, Disp: 120 tablet, Rfl: 1    Allergies   Allergen Reactions    Benadryl [Diphenhydramine] Anaphylaxis     Throat closing    Phenergan [Promethazine] Anaphylaxis       Social History   Past Surgical History:   Procedure Laterality Date    CSF SHUNT      LP shunt - 2015 -  shunt - 2017    ESOPHAGOGASTRODUODENOSCOPY N/A 2/23/2018    Procedure: ESOPHAGOGASTRODUODENOSCOPY (EGD) WITH ESOPHAGEAL STENT PLACEMENT;  Surgeon: Malgorzata Sanderson MD;  [facility];  Service: Thoracic    ESOPHAGOGASTRODUODENOSCOPY N/A 2/23/2018    Procedure: ESOPHAGOGASTRODUODENOSCOPY (EGD) WITH REMOVAL ESOPHAGEAL STENT  AND REPLACEMENT WITH 23mm X155mm [address];  Surgeon: Malgorzata Sanderson MD;  [facility];  Service: Thoracic    ESOPHAGOGASTRODUODENOSCOPY N/A 3/28/2018    Procedure: ESOPHAGOGASTRODUODENOSCOPY (EGD) with PEJ placement ;  Surgeon: Stephane Drew MD;  [facility]; Service: Gastroenterology    ESOPHAGOGASTRODUODENOSCOPY N/A 4/5/2018    Procedure: ESOPHAGOGASTRODUODENOSCOPY (EGD) with botox injection and kaofed placement;  Surgeon: Mere Romero DO;  [facility]; Service: Gastroenterology    ESOPHAGOGASTRODUODENOSCOPY N/A 4/10/2018    Procedure: ESOPHAGOGASTRODUODENOSCOPY (EGD) with Kaofed placement;  Surgeon: Tommie Holstein, MD;  [facility]; Service: Gastroenterology    ESOPHAGOSCOPY WITH STENT INSERTION N/A 1/24/2018    Procedure: INSERTION STENT ESOPHAGEAL;  Surgeon: Jose Antonio Benites MD;  [facility]; Service: Gastroenterology    EYE SURGERY Right 1980    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)     GASTRIC BYPASS  12/19/2016    Lap sleeve gastrectomy w/shunt length shortening procedure    HERNIA REPAIR      HYSTERECTOMY  03/14/2013    IR LUMBAR PUNCTURE  8/29/2018    LAPAROTOMY N/A 1/25/2018    Procedure: Exploratory Laparotomy, wash out,placement of drains, placement of NG feeding tube ;   Surgeon: Gay Casillas DO;  [facility];  Service: General    LUMBAR PERITONEAL SHUNT  11/19/2015    Laparoscopic assisted    MO CREATE SHUNT:VENTRIC-PERITONEAL Right 5/31/2017    Procedure: IMAGE GUIDED CORONAL PLACEMENT OF PROGRAMABLE VENTRICULAR-PERITONEAL SHUNT, REMOVAL OF LP SHUNT ;  Surgeon: Lizbeth Cooley MD;  [facility];  Service: Neurosurgery    [place] EGD TRANSORAL BIOPSY SINGLE/MULTIPLE N/A 6/27/2018    Procedure: ESOPHAGOGASTRODUODENOSCOPY (EGD) with padlock clip placement;  Surgeon: Barrie Juarez MD;  [facility]; Service: [facility] CSF SHUNT,W/O REPLACE Right 2/5/2018    Procedure: Removal of  shunt;  Surgeon: Lizbeth Cooley MD;  [facility];  Service: Neurosurgery    W[place] REPLACEMENT/REVISION,CSF SHUNT Right 1/25/2018    Procedure: Externalization of right-sided SHUNT VENTRICULAR-PERITONEAL in anterior chest wall ribs two and three level  ;  Surgeon: Joan Worthington MD;  [facility];  Service: Neurosurgery    WRIST SURGERY Right     x3 2006, 2008     Family History   Problem Relation Age of Onset    No Known Problems Mother     No Known Problems Father     Thyroid cancer Brother     Colon cancer Maternal Grandfather     Cancer Paternal Grandmother     Cancer Paternal Grandfather     Cancer Family         Gastric    Leukemia Family     Colon cancer Family     Pancreatic cancer Family        Objective:  /80 (BP Location: Left arm, Patient Position: Sitting, Cuff Size: Adult)   Pulse 69   Temp 98 2 °F (36 8 °C) (Oral)   Ht 5' 3" (1 6 m)   Wt 61 5 kg (135 lb 9 6 oz)   LMP  (LMP Unknown)   SpO2 99%   BMI 24 02 kg/m²     No results found for this or any previous visit (from the past 1344 hour(s))  Physical Exam   Constitutional: She is oriented to person, place, and time  She appears well-developed and well-nourished  No distress  HENT:   Head: Normocephalic and atraumatic  Right Ear: External ear normal    Left Ear: External ear normal    Nose: Nose normal    Mouth/Throat: Oropharynx is clear and moist  No oropharyngeal exudate  Eyes: Pupils are equal, round, and reactive to light   Conjunctivae and EOM are normal  Right eye exhibits no discharge  Left eye exhibits no discharge  Neck: Normal range of motion  Neck supple  No thyromegaly present  Cardiovascular: Normal rate, regular rhythm, normal heart sounds and intact distal pulses  Exam reveals no gallop and no friction rub  No murmur heard  Pulmonary/Chest: Effort normal and breath sounds normal  No stridor  No respiratory distress  She has no wheezes  She has no rales  Abdominal: Soft  Bowel sounds are normal  She exhibits no distension  There is no tenderness  Multiple scars noted to patient's abdomen, no signs of infection, patient has some mild tenderness to her suprapubic area, hyperactive bowel sounds throughout   Lymphadenopathy:     She has no cervical adenopathy  Neurological: She is alert and oriented to person, place, and time  Skin: Skin is warm and dry  No rash noted  She is not diaphoretic  No erythema  Psychiatric: She has a normal mood and affect   Her behavior is normal  Judgment and thought content normal

## 2019-01-10 NOTE — ASSESSMENT & PLAN NOTE
Patient currently follows Neurology and was last seen on 12/13/19, patient had reported similar symptoms at that time, however she states that they have worsened slightly    Did advise patient to follow-up with her neurologist

## 2019-02-19 ENCOUNTER — TELEPHONE (OUTPATIENT)
Dept: INTERNAL MEDICINE CLINIC | Facility: CLINIC | Age: 44
End: 2019-02-19

## 2019-02-19 NOTE — TELEPHONE ENCOUNTER
No future OVS    Spoke to patient reminded her of health maintenance well measure due; mammogram     Pt stated she has many health issues lately; will schedule when she can

## 2019-03-06 ENCOUNTER — TRANSCRIBE ORDERS (OUTPATIENT)
Dept: NEUROLOGY | Facility: CLINIC | Age: 44
End: 2019-03-06

## 2019-03-06 DIAGNOSIS — G93.2 BENIGN INTRACRANIAL HYPERTENSION: Primary | ICD-10-CM

## 2019-03-06 DIAGNOSIS — T85.730D: ICD-10-CM

## 2019-03-06 DIAGNOSIS — Z98.2 PRESENCE OF CEREBROSPINAL FLUID DRAINAGE DEVICE: ICD-10-CM

## 2019-03-08 ENCOUNTER — OFFICE VISIT (OUTPATIENT)
Dept: NEUROLOGY | Facility: CLINIC | Age: 44
End: 2019-03-08
Payer: COMMERCIAL

## 2019-03-08 VITALS
BODY MASS INDEX: 24.47 KG/M2 | SYSTOLIC BLOOD PRESSURE: 115 MMHG | WEIGHT: 138.1 LBS | HEIGHT: 63 IN | HEART RATE: 61 BPM | DIASTOLIC BLOOD PRESSURE: 63 MMHG

## 2019-03-08 DIAGNOSIS — F32.0 CURRENT MILD EPISODE OF MAJOR DEPRESSIVE DISORDER, UNSPECIFIED WHETHER RECURRENT (HCC): ICD-10-CM

## 2019-03-08 DIAGNOSIS — Z86.69 HISTORY OF IDIOPATHIC INTRACRANIAL HYPERTENSION: ICD-10-CM

## 2019-03-08 DIAGNOSIS — G93.2 PSEUDOTUMOR CEREBRI: ICD-10-CM

## 2019-03-08 DIAGNOSIS — R51.0 POSITIONAL HEADACHE: ICD-10-CM

## 2019-03-08 DIAGNOSIS — G44.221 CHRONIC TENSION-TYPE HEADACHE, INTRACTABLE: Primary | ICD-10-CM

## 2019-03-08 PROBLEM — R51.9 OCCIPITAL HEADACHE: Status: RESOLVED | Noted: 2017-05-31 | Resolved: 2019-03-08

## 2019-03-08 PROBLEM — Z01.818 PRE-OP EXAM: Status: RESOLVED | Noted: 2018-08-24 | Resolved: 2019-03-08

## 2019-03-08 PROCEDURE — 99214 OFFICE O/P EST MOD 30 MIN: CPT | Performed by: PSYCHIATRY & NEUROLOGY

## 2019-03-08 RX ORDER — DIVALPROEX SODIUM 250 MG/1
TABLET, DELAYED RELEASE ORAL
Qty: 10 TABLET | Refills: 0 | Status: SHIPPED | OUTPATIENT
Start: 2019-03-08 | End: 2019-05-10

## 2019-03-08 NOTE — PROGRESS NOTES
[facility]  PATIENT:  Florencia Courser  MRN:  [id number]  :  1975  DATE OF SERVICE:  3/8/2019      Assessment/Plan:        Problem List Items Addressed This Visit        Cardiovascular and Mediastinum    RESOLVED: Pseudotumor cerebri       Nervous and Auditory    Chronic tension-type headache, intractable - Primary    Relevant Medications    divalproex sodium (DEPAKOTE) 250 mg EC tablet       Other    Depression    History of idiopathic intracranial hypertension    Positional headache    Relevant Medications    divalproex sodium (DEPAKOTE) 250 mg EC tablet          Preventive:   - none at this time  - consider adding a muscle relaxant  Abortive:   - will try Depakote 500 mg at bedtime for 5 days  - did not want to try decadron  - if Depakote fails consider adding cyproheptadine 4 mg 1/4 of a pill at bedtime daily to see if that helps  Also consider Lamictal      Follow up in 2 months         History of Present Illness: We had the pleasure of evaluating Johnathon Celaya in neurological follow-up today  As you know she is a 39year old right-handed female here for evaluation of headaches       Past medical history:  Went to [facility] for hiatal hernia and was in ICU for a long time  Was septic after her surgeries and had her  shunt removed 2018 due to concerns of bacterial infection spreading through the shunt      Idiopathic intracranial hypertension:  She is s/p lumboperitoneal shunt placement for increased intracranial pressure, performed at [facility] in  and removal of the shunt and placement of a  shunt in May of 2017  She is also status post gastric bypass surgery and has lost 60lbs  She saw her ophthalmologist in July and there was no optic nerve swelling      What medications do you take or have you taken for your headaches?    PREVENTIVE:  -  Nortriptyline (this helped her headaches),  Protriptyline  - Gabapentin (off balance), Depakote  -  Diamox (stopped by provider because of her history of kidney stones),   - Verapamil (fatigue),  ABORTIVE: Fioricet (stopped on her own), Tramadol (stopped on her own), Toradol (stopped on her own), Indomethacin    Non-Medical/Alternative Treatments used in the past for headaches: Has tried Massage (doesnt help headaches), Chiropractic adjustment (doesnt help with headaches), Acupuncture (doesnt help with headaches)    What is your current pain level - 5/10,    How often do the headaches occur -   Baseline pain is : daily and is 5/10  Increase pain: twice a day lasting for few minutes to 30 min max - this happens daily  She is also getting the sensation of light headed and pain in the frontal region with bending over or getting up from sitting potision      What time of the day do the headaches start -   Baseline pain: there all the time  Increase pain: anytime of the day    How long do the headaches last -   Baseline pain is : there all the time  Increase pain: few minutes to 30 min max - this happens daily  She is also getting the sensation of light headed and pain in the frontal region with bending over or getting up from sitting potision    Are you ever headache free - no always has pressure in her head    Where are they located -   Baseline pain: entire head  Increase pain: one temple to the other or frontal to the back of her head  Position change: frontal only    What is the intensity of pain -   Baseline pain: 5/10  Intense pain: 9/10  Position change pain: 9/10    Any warning prior to headache and how long do they last - N/A, they're constant    Describe your usual headache -   Baseline pain: Pressure, "like my brain is going to explode out of my skull"  Increase pain: zap, zinger and it can be sharp (pt has difficulty describing the sensation)  Position change: sharp pressure    Associated symptoms:   -       Problem with concentration  -       Blurred vision occasionally  -       Dizziness when extending or flexing neck to look up or down  -       prefer to be alone and in a dark room, unable to work    Headache are worse if the patient: cough, sneeze, bending over, moving bowel, running or exercising,   Headache trigger: N/A, they're constant    Have you used CBD or THC for your headaches and how often? No  Are you current pregnant or planning on getting pregnant? No  Have you ever had any Brain imaging? yes  I personally reviewed these images        MRAs of the head and neck without contrast done on 3/29/17 are both unremarkable      Brain MRI without contrast on 3/27/17 shows ventricles in the lower limits of normal in size consistent with pseudotumor cerebri, as well as scattered parenchymal WM changes which are nonspecific      Head CT 7/17: No acute intracranial abnormality      LP @ [facility] April/ 2017: OP 20, CP14      Past Medical History:   Diagnosis Date    Abdominal pain     Brain condition     Pseudotumor Cerebri     Chronic kidney disease     De Quervain's disease (tenosynovitis)     Depression 1/10/2019    Esophageal perforation     Gastric leak     GERD (gastroesophageal reflux disease)     Idiopathic intracranial hypertension     Kidney stone     Migraine     Obesity     Papilledema, both eyes     Postgastrectomy malabsorption     Presence of lumboperitoneal shunt     Resolved: Sep 20, 2017    Rotator cuff tendinitis     Resolved: Aug 23, 2017    Visual field defect        Patient Active Problem List   Diagnosis    Gastric leak    Acute peritonitis    S/P  shunt    Infection of  (ventriculoperitoneal) shunt, subsequent encounter    Murmur, cardiac    Refusal of blood product    Gastroesophageal reflux disease    Choroidal nevus, right eye    Moderate protein-calorie malnutrition (HCC)    Gastric perforation (HCC)    Postoperative intra-abdominal abscess    Abdominal wound dehiscence    Neutrophilic leukocytosis    Left-sided chest wall pain    Bariatric surgery status    Refusal of influenza vaccine by provider    Renal stones    Depression    Diarrhea    History of idiopathic intracranial hypertension    Positional headache    Chronic tension-type headache, intractable       Medications:      Current Outpatient Medications   Medication Sig Dispense Refill    Calcium Carb-Cholecalciferol (CALCIUM 1000 + D PO) Take 1 capsule by mouth daily      Multiple Vitamin (MULTIVITAMIN) tablet Take 1 tablet by mouth daily      omeprazole (PriLOSEC) 40 MG capsule Take 1 capsule (40 mg total) by mouth 2 (two) times a day for 90 days 180 capsule 1    patient supplied medication Take 1 each by mouth daily bariatric vitamin, patient unsure of medication name      acetaZOLAMIDE (DIAMOX) 250 mg tablet Take 2 tablets (500 mg total) by mouth 2 (two) times a day for 30 days (Patient not taking: Reported on 3/8/2019) 120 tablet 1    divalproex sodium (DEPAKOTE) 250 mg EC tablet Two tabs at bedtime daily for 5 days 10 tablet 0     No current facility-administered medications for this visit  Allergies:       Allergies   Allergen Reactions    Benadryl [Diphenhydramine] Anaphylaxis     Throat closing    Phenergan [Promethazine] Anaphylaxis       Family History:     Family History   Problem Relation Age of Onset    No Known Problems Mother     No Known Problems Father     Thyroid cancer Brother     Colon cancer Maternal Grandfather     Cancer Paternal Grandmother     Cancer Paternal Grandfather     Cancer Family         Gastric    Leukemia Family     Colon cancer Family     Pancreatic cancer Family        Social History:     Social History     Socioeconomic History    Marital status: Single     Spouse name: Not on file    Number of children: 2    Years of education: High School or GED     Highest education level: Not on file   Occupational History    Occupation: employed    Social Needs    Financial resource strain: Not on file    Food insecurity:     Worry: Not on file Inability: Not on file    Transportation needs:     Medical: Not on file     Non-medical: Not on file   Tobacco Use    Smoking status: Former Smoker     Packs/day: 0 50     Years: 20 00     Pack years: 10 00     Last attempt to quit: 2012     Years since quittin 5    Smokeless tobacco: Never Used    Tobacco comment: Per [employer]: Never a smoker   Substance and Sexual Activity    Alcohol use: No    Drug use: No    Sexual activity: Yes   Lifestyle    Physical activity:     Days per week: Not on file     Minutes per session: Not on file    Stress: Not on file   Relationships    Social connections:     Talks on phone: Not on file     Gets together: Not on file     Attends Alevism service: Not on file     Active member of club or organization: Not on file     Attends meetings of clubs or organizations: Not on file     Relationship status: Not on file    Intimate partner violence:     Fear of current or ex partner: Not on file     Emotionally abused: Not on file     Physically abused: Not on file     Forced sexual activity: Not on file   Other Topics Concern    Not on file   Social History Narrative    ** Merged History Encounter **              Objective:   Physical Exam:                                                                   Vitals:            /63 (BP Location: Left arm, Patient Position: Sitting, Cuff Size: Standard)   Pulse 61   Ht 5' 3" (1 6 m)   Wt 62 6 kg (138 lb 1 6 oz)   LMP  (LMP Unknown)   BMI 24 46 kg/m²   BP Readings from Last 3 Encounters:   19 115/63   01/10/19 124/80   18 121/65     Pulse Readings from Last 3 Encounters:   19 61   01/10/19 69   18 63            CONSTITUTIONAL: Well developed, well nourished, well groomed  No dysmorphic features  Eyes:  PERRLA, EOM normal      Neck:  Normal ROM, neck supple  HEENT:  Normocephalic atraumatic  No meningismus  Oropharynx is clear and moist  No oral mucosal lesions     Chest: Respirations regular and unlabored  Cardiovascular:  Distal extremities warm without palpable edema or tenderness, no observed significant swelling  Musculoskeletal:  Full range of motion  Skin:  warm and dry   Psychiatric:  Normal behavior and appropriate affect        Neurological Examination:   Mental status/cognitive function: Orientated to time, place and person  Cranial Nerves: 2 to 12 intact  Motor Exam:  5/5 upper and lower extremity  Sensory: grossly intact light touch in all extremities  Reflexes: 2/4 throughout  Coordination: Finger nose finger intact bilaterally, no tremor noted  Gait: steady casual and tandem gait  Romberg Negative  Review of Systems:   Review of Systems   Constitutional: Negative  HENT: Positive for tinnitus  Eyes: Positive for pain  Blurred Vision    Respiratory: Negative  Cardiovascular: Negative  Gastrointestinal: Positive for constipation, diarrhea, nausea and vomiting  Endocrine: Positive for cold intolerance  Genitourinary: Negative  Musculoskeletal: Positive for back pain  Joint Pain    Skin: Negative  Allergic/Immunologic: Negative  Neurological: Positive for dizziness, light-headedness and headaches  Hematological: Negative  Psychiatric/Behavioral: Positive for decreased concentration  I personally reviewed and updated the ROS that was entered by the medical assistant       I have spent 30 minutes with Patient  today in which greater than 50% of this time was spent in counseling/coordination of care regarding Patient and family education, Importance of tx compliance, Risk factor reductions, Impressions and plan of care as above         Author:  Xochitl Pretty MD 3/8/2019 9:51 AM

## 2019-04-02 ENCOUNTER — HOSPITAL ENCOUNTER (OUTPATIENT)
Dept: CT IMAGING | Facility: HOSPITAL | Age: 44
Discharge: HOME | End: 2019-04-02
Attending: UROLOGY
Payer: COMMERCIAL

## 2019-04-02 DIAGNOSIS — N20.0 RENAL STONES: ICD-10-CM

## 2019-04-02 PROCEDURE — 74176 CT ABD & PELVIS W/O CONTRAST: CPT

## 2019-04-29 ENCOUNTER — TELEPHONE (OUTPATIENT)
Dept: UROLOGY | Facility: MEDICAL CENTER | Age: 44
End: 2019-04-29

## 2019-05-08 RX ORDER — OMEPRAZOLE 40 MG/1
CAPSULE, DELAYED RELEASE ORAL
Refills: 1 | COMMUNITY
Start: 2019-04-25 | End: 2019-05-10 | Stop reason: SDUPTHER

## 2019-05-10 ENCOUNTER — TELEPHONE (OUTPATIENT)
Dept: NEUROLOGY | Facility: CLINIC | Age: 44
End: 2019-05-10

## 2019-05-10 ENCOUNTER — OFFICE VISIT (OUTPATIENT)
Dept: NEUROLOGY | Facility: CLINIC | Age: 44
End: 2019-05-10
Payer: COMMERCIAL

## 2019-05-10 VITALS
HEIGHT: 63 IN | HEART RATE: 69 BPM | DIASTOLIC BLOOD PRESSURE: 66 MMHG | SYSTOLIC BLOOD PRESSURE: 129 MMHG | WEIGHT: 142.9 LBS | BODY MASS INDEX: 25.32 KG/M2

## 2019-05-10 DIAGNOSIS — N20.0 RENAL STONES: ICD-10-CM

## 2019-05-10 DIAGNOSIS — Z98.2 S/P VP SHUNT: ICD-10-CM

## 2019-05-10 DIAGNOSIS — G44.221 CHRONIC TENSION-TYPE HEADACHE, INTRACTABLE: Primary | ICD-10-CM

## 2019-05-10 DIAGNOSIS — F32.0 CURRENT MILD EPISODE OF MAJOR DEPRESSIVE DISORDER, UNSPECIFIED WHETHER RECURRENT (HCC): ICD-10-CM

## 2019-05-10 DIAGNOSIS — R51.0 POSITIONAL HEADACHE: ICD-10-CM

## 2019-05-10 PROCEDURE — 99215 OFFICE O/P EST HI 40 MIN: CPT | Performed by: PHYSICIAN ASSISTANT

## 2019-05-10 RX ORDER — LAMOTRIGINE 25 MG/1
TABLET ORAL
Qty: 120 TABLET | Refills: 2 | Status: SHIPPED | OUTPATIENT
Start: 2019-05-10 | End: 2019-08-05

## 2019-05-15 ENCOUNTER — APPOINTMENT (OUTPATIENT)
Dept: LAB | Facility: CLINIC | Age: 44
End: 2019-05-15
Payer: COMMERCIAL

## 2019-05-15 DIAGNOSIS — N20.0 RENAL STONES: ICD-10-CM

## 2019-05-15 PROCEDURE — 82140 ASSAY OF AMMONIA: CPT

## 2019-05-15 PROCEDURE — 82436 ASSAY OF URINE CHLORIDE: CPT

## 2019-05-15 PROCEDURE — 83935 ASSAY OF URINE OSMOLALITY: CPT

## 2019-05-15 PROCEDURE — 83735 ASSAY OF MAGNESIUM: CPT

## 2019-05-15 PROCEDURE — 82131 AMINO ACIDS SINGLE QUANT: CPT

## 2019-05-15 PROCEDURE — 83945 ASSAY OF OXALATE: CPT

## 2019-05-15 PROCEDURE — 82507 ASSAY OF CITRATE: CPT

## 2019-05-15 PROCEDURE — 84133 ASSAY OF URINE POTASSIUM: CPT

## 2019-05-15 PROCEDURE — 84105 ASSAY OF URINE PHOSPHORUS: CPT

## 2019-05-15 PROCEDURE — 81003 URINALYSIS AUTO W/O SCOPE: CPT

## 2019-05-15 PROCEDURE — 84300 ASSAY OF URINE SODIUM: CPT

## 2019-05-15 PROCEDURE — 84560 ASSAY OF URINE/URIC ACID: CPT

## 2019-05-15 PROCEDURE — 82570 ASSAY OF URINE CREATININE: CPT

## 2019-05-15 PROCEDURE — 82340 ASSAY OF CALCIUM IN URINE: CPT

## 2019-05-15 PROCEDURE — 84392 ASSAY OF URINE SULFATE: CPT

## 2019-05-24 LAB
AMMONIA 24H UR-MRATE: 21 MEQ/24 HR
AMMONIA UR-SCNC: ABNORMAL UG/DL
CA H2 PHOS DIHYD CRY URNS QL MICRO: 2.77 RATIO (ref 0–3)
CALCIUM 24H UR-MCNC: 13.1 MG/DL
CALCIUM 24H UR-MRATE: 95 MG/24 HR (ref 100–300)
CHLORIDE 24H UR-SCNC: 163 MMOL/L
CHLORIDE 24H UR-SRATE: 118 MMOL/24 HR (ref 110–250)
CITRATE 24H UR-MCNC: 711 MG/L
CITRATE 24H UR-MRATE: 515 MG/24 HR (ref 320–1240)
COM CRY STONE QL IR: 3.83 RATIO (ref 0–6)
CREAT 24H UR-MCNC: 119.7 MG/DL
CREAT 24H UR-MRATE: 867.8 MG/24 HR (ref 800–1800)
CYSTINE 24H UR-MCNC: <0.24 MG/L
CYSTINE 24H UR-MRATE: <.17 MG/24 HR (ref 10–100)
MAGNESIUM 24H UR-MRATE: 43 MG/24 HR (ref 12–293)
MAGNESIUM UR-MCNC: 5.9 MG/DL
NA URATE CRY STONE QL IR: 12.12 RATIO (ref 0–4)
OSMOLALITY UR: 771 MOSMOL/KG (ref 300–900)
OXALATE 24H UR-MRATE: 13 MG/24 HR (ref 4–31)
OXALATE UR-MCNC: 18 MG/L
PH 24H UR: 6.9 [PH]
PHOSPHATE 24H UR-MCNC: 53.1 MG/DL
PHOSPHATE 24H UR-MRATE: 385 MG/24 HR (ref 400–1300)
PLEASE NOTE (STONE RISK): ABNORMAL
POTASSIUM 24H UR-SCNC: 23.3 MMOL/24 HR (ref 25–125)
POTASSIUM UR-SCNC: 32.1 MMOL/L
PRESERVED URINE: 725 ML/24 HR (ref 600–1600)
SODIUM 24H UR-SCNC: 192 MMOL/L
SODIUM 24H UR-SRATE: 139 MMOL/24 HR (ref 39–258)
SPECIMEN VOL 24H UR: 725 ML/24 HR (ref 600–1600)
SULFATE 24H UR-MCNC: 9 MEQ/24 HR (ref 0–30)
SULFATE UR-MCNC: 13 MEQ/L
TRI-PHOS CRY STONE MICRO: 0.37 RATIO (ref 0–1)
URATE 24H UR-MCNC: 52.5 MG/DL
URATE 24H UR-MRATE: 381 MG/24 HR (ref 250–750)
URATE DIHYD CRY STONE QL IR: 0.32 RATIO (ref 0–1.2)

## 2019-05-28 ENCOUNTER — OFFICE VISIT (OUTPATIENT)
Dept: UROLOGY | Facility: MEDICAL CENTER | Age: 44
End: 2019-05-28
Payer: COMMERCIAL

## 2019-05-28 VITALS
SYSTOLIC BLOOD PRESSURE: 128 MMHG | WEIGHT: 140 LBS | BODY MASS INDEX: 23.9 KG/M2 | HEIGHT: 64 IN | DIASTOLIC BLOOD PRESSURE: 64 MMHG | HEART RATE: 67 BPM

## 2019-05-28 DIAGNOSIS — N20.0 RENAL STONES: Primary | ICD-10-CM

## 2019-05-28 PROCEDURE — 81003 URINALYSIS AUTO W/O SCOPE: CPT | Performed by: UROLOGY

## 2019-05-28 PROCEDURE — 99213 OFFICE O/P EST LOW 20 MIN: CPT | Performed by: UROLOGY

## 2019-07-31 NOTE — PROGRESS NOTES
[facility]  PATIENT:  Abelino Reed  MRN:  [id number]  :  1975  DATE OF SERVICE:  2019      Assessment/Plan:     Chronic tension-type headache, intractable   Preventive:   None  Continue all your vitamins  Abortive:   -nothing for now  Follow up in 2 years    Consider seeing a trauma therapist    Renal stones  No topamax or diamox due to renal stones    S/P  shunt  Neurosurgery is not placing new shunt at this time  Has no current evidence of papilledema  Continue to follow up with neurosurgery    Positional headache  Call us if worsens    Depression  Recommend therapy         Problem List Items Addressed This Visit        Nervous and Auditory    Chronic tension-type headache, intractable      Preventive:   None  Continue all your vitamins  Abortive:   -nothing for now  Follow up in 2 years    Consider seeing a trauma therapist            Genitourinary    Renal stones - Primary     No topamax or diamox due to renal stones            Other    S/P  shunt     Neurosurgery is not placing new shunt at this time  Has no current evidence of papilledema  Continue to follow up with neurosurgery         Depression     Recommend therapy         Positional headache     Call us if worsens           Other Visit Diagnoses     Headache        Presence of cerebrospinal fluid drainage device        Benign intracranial hypertension                  History of Present Illness: We had the pleasure of evaluating Shweta Lafleur in neurological follow up  today for headaches  As you know,  she is a 39 y o  right handedfemale      Past medical history:  [facility] to [address] [place] [address] for hiatal hernia and was in ICU for a long time   Was septic after her surgeries and had her  shunt removed 2018 due to concerns of bacterial infection spreading through the shunt      Idiopathic intracranial hypertension:  She is s/p lumboperitoneal shunt placement for increased intracranial pressure, performed at [facility] in 2015 and removal of the shunt and placement of a  shunt in May of 2017  She is also status post gastric bypass surgery and has lost 60lbs       She saw her ophthalmologist 3/2019 and there was no optic nerve swelling      Patient reports she has daily pressure and pain  Has good days and bad days but is always has pressure  Patient states that she started medical marijuana 2 weeks ago and feels markedly better  Sometimes does get a little worse when she is out and about because she doesn't carry it with her         What medications do you take or have you taken for your headaches? PREVENTIVE:  -  Nortriptyline (this helped her headaches),  Protriptyline  - Gabapentin (off balance), Depakote, lamictal  -  Diamox (stopped by provider because of her history of kidney stones),   - Verapamil (fatigue),  ABORTIVE: Fioricet (stopped on her own), Tramadol (stopped on her own), Toradol (stopped on her own), Indomethacin     Non-Medical/Alternative Treatments used in the past for headaches: Has tried Massage (doesnt help headaches), Chiropractic adjustment (doesnt help with headaches), Acupuncture (doesnt help with headaches)     What is your current pain level - 4/10,     How often do the headaches occur -   Baseline pain is : daily and is 4/10  Increase pain: does have variable days of increasing pain    Pain can increase for 30 minutes or last all day  Can have 10/10 pain a few times a week  She is also getting the sensation of light headed and pain in the frontal region with bending over or getting up from sitting potision      What time of the day do the headaches start -   Baseline pain: there all the time  Increase pain: anytime of the day     How long do the headaches last -   Baseline pain is : there all the time  Increase pain: few minutes to 30 min max - this happens daily  She is also getting the sensation of light headed and pain in the frontal region with bending over or getting up from sitting poistion-this is also variable, usually happens 1-2 times a week  Lasts only a minute or 2     Are you ever headache free - no always has pressure in her head     Where are they located -   Baseline pain: entire head  Increase pain: one temple to the other or frontal to the back of her head  Position change: frontal only     What is the intensity of pain -   Baseline pain: 4/10  Intense pain: 10/10  Position change pain: 9/10     Any warning prior to headache and how long do they last - N/A, they're constant     Describe your usual headache -   Baseline pain: Pressure, "like my brain is going to explode out of my skull"  Increase pain: zap, zinger and it can be sharp (pt has difficulty describing the sensation)  Position change: sharp pressure     Associated symptoms:   -       Problem with concentration  -       Blurred vision occasionally  -       Dizziness when extending or flexing neck to look up or down  -       prefer to be alone and in a dark room, unable to work     Headache are worse if the patient: cough, sneeze, bending over, moving bowel, running or exercising,   Headache trigger: N/A, they're constant     Have you used CBD or THC for your headaches and how often? Yes, has medical card  Are you current pregnant or planning on getting pregnant? No  Have you ever had any Brain imaging? yes  I personally reviewed these images         MRAs of the head and neck without contrast done on 3/29/17 are both unremarkable      Brain MRI without contrast on 3/27/17 shows ventricles in the lower limits of normal in size consistent with pseudotumor cerebri, as well as scattered parenchymal WM changes which are nonspecific      Head CT 7/17: No acute intracranial abnormality      LP @ [facility] April/ 2017: OP 20, CP14      - Reviewed old notes from physician seen in the past- see above HPI for summary of previous encounters       Past Medical History:   Diagnosis Date    Abdominal pain     Brain condition Pseudotumor Cerebri     Chronic kidney disease     De Quervain's disease (tenosynovitis)     Depression 1/10/2019    Esophageal perforation     Gastric leak     GERD (gastroesophageal reflux disease)     Idiopathic intracranial hypertension     Kidney stone     Migraine     Obesity     Papilledema, both eyes     Postgastrectomy malabsorption     Presence of lumboperitoneal shunt     Resolved: Sep 20, 2017    Rotator cuff tendinitis     Resolved: Aug 23, 2017    Visual field defect        Patient Active Problem List   Diagnosis    Gastric leak    Acute peritonitis    S/P  shunt    Infection of  (ventriculoperitoneal) shunt, subsequent encounter    Murmur, cardiac    Refusal of blood product    Gastroesophageal reflux disease    Choroidal nevus, right eye    Moderate protein-calorie malnutrition (HCC)    Gastric perforation (HCC)    Postoperative intra-abdominal abscess    Abdominal wound dehiscence    Neutrophilic leukocytosis    Left-sided chest wall pain    Bariatric surgery status    Refusal of influenza vaccine by provider    Renal stones    Depression    Diarrhea    History of idiopathic intracranial hypertension    Positional headache    Chronic tension-type headache, intractable       Medications:      Current Outpatient Medications   Medication Sig Dispense Refill    Calcium Carb-Cholecalciferol (CALCIUM 1000 + D PO) Take 1 capsule by mouth daily      Multiple Vitamin (MULTIVITAMIN) tablet Take 1 tablet by mouth daily      patient supplied medication Take 1 each by mouth daily bariatric vitamin, patient unsure of medication name      omeprazole (PriLOSEC) 40 MG capsule Take 1 capsule (40 mg total) by mouth 2 (two) times a day for 90 days 180 capsule 1     No current facility-administered medications for this visit  Allergies:       Allergies   Allergen Reactions    Benadryl [Diphenhydramine] Anaphylaxis     Throat closing    Phenergan [Promethazine] Anaphylaxis       Family History:     Family History   Problem Relation Age of Onset    No Known Problems Mother     No Known Problems Father     Thyroid cancer Brother     Colon cancer Maternal Grandfather     Cancer Paternal Grandmother     Cancer Paternal Grandfather     Cancer Family         Gastric    Leukemia Family     Colon cancer Family     Pancreatic cancer Family        Social History:     Social History     Socioeconomic History    Marital status: Single     Spouse name: Not on file    Number of children: 2    Years of education: High School or GED     Highest education level: Not on file   Occupational History    Occupation: employed    Social Needs    Financial resource strain: Not on file    Food insecurity:     Worry: Not on file     Inability: Not on file   [employer] needs:     Medical: Not on file     Non-medical: Not on file   Tobacco Use    Smoking status: Former Smoker     Packs/day: 0 50     Years: 20 00     Pack years: 10 00     Last attempt to quit: 2012     Years since quittin 9    Smokeless tobacco: Never Used    Tobacco comment: Per [employer]: Never a smoker   Substance and Sexual Activity    Alcohol use: No    Drug use: No    Sexual activity: Yes   Lifestyle    Physical activity:     Days per week: Not on file     Minutes per session: Not on file    Stress: Not on file   Relationships    Social connections:     Talks on phone: Not on file     Gets together: Not on file     Attends Gnosticism service: Not on file     Active member of club or organization: Not on file     Attends meetings of clubs or organizations: Not on file     Relationship status: Not on file    Intimate partner violence:     Fear of current or ex partner: Not on file     Emotionally abused: Not on file     Physically abused: Not on file     Forced sexual activity: Not on file   Other Topics Concern    Not on file   Social History Narrative    ** Merged History Encounter ** Objective:   Physical Exam:                                                                   Vitals:            /72 (BP Location: Left arm, Patient Position: Sitting, Cuff Size: Standard)   Pulse 62   Ht 5' 4" (1 626 m)   Wt 64 4 kg (142 lb)   LMP  (LMP Unknown)   BMI 24 37 kg/m²   BP Readings from Last 3 Encounters:   08/05/19 124/72   05/28/19 128/64   05/10/19 129/66     Pulse Readings from Last 3 Encounters:   08/05/19 62   05/28/19 67   05/10/19 69                CONSTITUTIONAL: Well developed, well nourished, well groomed  No dysmorphic features  Eyes:  PERRLA, EOM normal      Neck:  Normal ROM, neck supple  HEENT:  Normocephalic atraumatic  No meningismus  Oropharynx is clear and moist  No oral mucosal lesions  Chest:  Respirations regular and unlabored  Cardiovascular:  Distal extremities warm without palpable edema or tenderness, no observed significant swelling  Musculoskeletal:  Full range of motion  (see below under neurologic exam for evaluation of motor function and gait)   Skin:  warm and dry   Psychiatric:  Normal behavior and appropriate affect        SKULL AND SPINE  ROM: Full range of motion  Tenderness: No focal tenderness    MENTAL STATUS  Orientation: Alert and oriented x 3  Fund of knowledge: Intact  CRANIAL NERVES  II: PERRL  Visual fields full  III, IV, VI: Extraocular movements intact  No nystagmus  V: Facial sensation normal V1-V3  VII: Facial movements normal and symmetric  VIII: Intact hearing bilaterally  IX, X: Palate elevation normal and symmetric  XI: Intact trapezius, SCM strength  XII: Tongue protrudes to the midline    MOTOR (Upper and lower extremities)   Bulk/tone/abnormal movement: Normal muscle bulk and tone  Strength: Strength 5/5 throughout  COORDINATION   Station/Gait: Normal baseline gait  SENSORY  Romberg sign absent      Reflexes:  deep tendon reflexes are 2+/4 throughout, flexor response bilaterally       Review of Systems:   Review of Systems   Constitutional: Negative  HENT: Negative  Eyes: Negative  Respiratory: Negative  Cardiovascular: Negative  Gastrointestinal: Negative  Endocrine: Negative  Genitourinary: Negative  Musculoskeletal: Positive for neck pain  Skin: Negative  Allergic/Immunologic: Negative  Neurological: Positive for dizziness, light-headedness and headaches  Hematological: Negative  Psychiatric/Behavioral: Negative  I personally reviewed and updated the ROS that was entered by the medical assistant       I have spent 25 minutes with Patient and family today in which greater than 50% of this time was spent in counseling/coordination of care regarding Diagnostic results, Prognosis, Risks and benefits of tx options, Intructions for management, Patient and family education and Importance of tx compliance        Author:  George Taylor PA-C 8/5/2019 12:12 PM

## 2019-08-02 ENCOUNTER — TRANSCRIBE ORDERS (OUTPATIENT)
Dept: NEUROLOGY | Facility: CLINIC | Age: 44
End: 2019-08-02

## 2019-08-02 DIAGNOSIS — R51.9 HEADACHE: ICD-10-CM

## 2019-08-02 DIAGNOSIS — Z98.2 PRESENCE OF CEREBROSPINAL FLUID DRAINAGE DEVICE: ICD-10-CM

## 2019-08-02 DIAGNOSIS — G44.221 CHRONIC TENSION-TYPE HEADACHE, INTRACTABLE: Primary | ICD-10-CM

## 2019-08-02 DIAGNOSIS — G93.2 BENIGN INTRACRANIAL HYPERTENSION: ICD-10-CM

## 2019-08-05 ENCOUNTER — OFFICE VISIT (OUTPATIENT)
Dept: NEUROLOGY | Facility: CLINIC | Age: 44
End: 2019-08-05
Payer: COMMERCIAL

## 2019-08-05 VITALS
BODY MASS INDEX: 24.24 KG/M2 | SYSTOLIC BLOOD PRESSURE: 124 MMHG | DIASTOLIC BLOOD PRESSURE: 72 MMHG | HEIGHT: 64 IN | HEART RATE: 62 BPM | WEIGHT: 142 LBS

## 2019-08-05 DIAGNOSIS — F32.0 CURRENT MILD EPISODE OF MAJOR DEPRESSIVE DISORDER, UNSPECIFIED WHETHER RECURRENT (HCC): ICD-10-CM

## 2019-08-05 DIAGNOSIS — Z98.2 PRESENCE OF CEREBROSPINAL FLUID DRAINAGE DEVICE: ICD-10-CM

## 2019-08-05 DIAGNOSIS — Z98.2 S/P VP SHUNT: ICD-10-CM

## 2019-08-05 DIAGNOSIS — G44.221 CHRONIC TENSION-TYPE HEADACHE, INTRACTABLE: ICD-10-CM

## 2019-08-05 DIAGNOSIS — G93.2 BENIGN INTRACRANIAL HYPERTENSION: ICD-10-CM

## 2019-08-05 DIAGNOSIS — N20.0 RENAL STONES: Primary | ICD-10-CM

## 2019-08-05 DIAGNOSIS — R51.9 HEADACHE: ICD-10-CM

## 2019-08-05 DIAGNOSIS — R51.0 POSITIONAL HEADACHE: ICD-10-CM

## 2019-08-05 PROCEDURE — 99214 OFFICE O/P EST MOD 30 MIN: CPT | Performed by: PHYSICIAN ASSISTANT

## 2019-08-05 NOTE — PROGRESS NOTES
Review of Systems   Constitutional: Negative  Negative for appetite change and fever  HENT: Negative  Negative for hearing loss, tinnitus, trouble swallowing and voice change  Eyes: Negative  Negative for photophobia and pain  Respiratory: Negative  Negative for shortness of breath  Cardiovascular: Negative  Negative for palpitations  Gastrointestinal: Negative  Negative for nausea  Endocrine: Negative  Negative for cold intolerance and heat intolerance  Genitourinary: Negative  Negative for dysuria, frequency and urgency  Musculoskeletal: Positive for neck pain  Negative for myalgias  Skin: Negative  Allergic/Immunologic: Negative  Neurological: Positive for dizziness, light-headedness and headaches  Negative for tremors, seizures, syncope, facial asymmetry, speech difficulty, weakness and numbness  Hematological: Negative  Does not bruise/bleed easily  Psychiatric/Behavioral: Negative  Negative for confusion and hallucinations

## 2019-08-05 NOTE — PATIENT INSTRUCTIONS
Preventive:   None  Continue all your vitamins  Abortive:   -nothing for now      Follow up in 2 years    Consider seeing a trauma therapist

## 2019-08-05 NOTE — ASSESSMENT & PLAN NOTE
Neurosurgery is not placing new shunt at this time  Has no current evidence of papilledema    Continue to follow up with neurosurgery

## 2019-08-12 DIAGNOSIS — E66.01 MORBID (SEVERE) OBESITY DUE TO EXCESS CALORIES (HCC): ICD-10-CM

## 2019-08-12 RX ORDER — OMEPRAZOLE 40 MG/1
40 CAPSULE, DELAYED RELEASE ORAL 2 TIMES DAILY
Qty: 180 CAPSULE | Refills: 0 | Status: SHIPPED | OUTPATIENT
Start: 2019-08-12 | End: 2019-11-25 | Stop reason: SDUPTHER

## 2019-09-20 ENCOUNTER — OFFICE VISIT (OUTPATIENT)
Dept: BARIATRICS | Facility: CLINIC | Age: 44
End: 2019-09-20
Payer: COMMERCIAL

## 2019-09-20 VITALS
SYSTOLIC BLOOD PRESSURE: 118 MMHG | TEMPERATURE: 98.5 F | BODY MASS INDEX: 25.16 KG/M2 | WEIGHT: 142 LBS | DIASTOLIC BLOOD PRESSURE: 70 MMHG | HEART RATE: 65 BPM | HEIGHT: 63 IN

## 2019-09-20 DIAGNOSIS — R11.10 REGURGITATION OF FOOD: ICD-10-CM

## 2019-09-20 DIAGNOSIS — Z98.84 BARIATRIC SURGERY STATUS: Primary | ICD-10-CM

## 2019-09-20 DIAGNOSIS — Z90.3 POSTGASTRECTOMY MALABSORPTION: Chronic | ICD-10-CM

## 2019-09-20 DIAGNOSIS — R11.2 NAUSEA AND VOMITING: ICD-10-CM

## 2019-09-20 DIAGNOSIS — K91.2 POSTGASTRECTOMY MALABSORPTION: Chronic | ICD-10-CM

## 2019-09-20 DIAGNOSIS — K91.2 POSTSURGICAL MALABSORPTION: ICD-10-CM

## 2019-09-20 DIAGNOSIS — K21.9 GASTROESOPHAGEAL REFLUX DISEASE WITHOUT ESOPHAGITIS: ICD-10-CM

## 2019-09-20 PROBLEM — K25.5 GASTRIC PERFORATION (HCC): Status: RESOLVED | Noted: 2018-01-24 | Resolved: 2019-09-20

## 2019-09-20 PROBLEM — K91.89 GASTRIC LEAK: Status: RESOLVED | Noted: 2018-01-24 | Resolved: 2019-09-20

## 2019-09-20 PROCEDURE — 99214 OFFICE O/P EST MOD 30 MIN: CPT | Performed by: PHYSICIAN ASSISTANT

## 2019-09-20 RX ORDER — SUCRALFATE ORAL 1 G/10ML
1 SUSPENSION ORAL 4 TIMES DAILY
Qty: 1200 ML | Refills: 1 | Status: SHIPPED | OUTPATIENT
Start: 2019-09-20 | End: 2019-12-31 | Stop reason: ALTCHOICE

## 2019-09-20 NOTE — ASSESSMENT & PLAN NOTE
I reviewed her case with Dr Betsy Ceja today and he is agreeable with my plan  She is status post-laparoscopic sleeve gastrectomy by Dr Bettyjo Cushing  She had a  prolonged hospitalization throughout 2296 for a complicated leak status post sleeve gastrectomy at an outside She had required nasojejunal tube feedings for nutrition support at that time which she was subsequently discontinued  She also had required the placement of a  pad lock device for  endoscopic closure of her fistula tract  Last year    She was able to transition to oral intakes only last year  She is here for routine annual follow-up now  She is reporting since later in 2018 she has had intermittent nausea/vomiting and regurgitation of food at night-she notes she "thought this was normal" after the sleeve gastrectomy and did not contact us between visits  See under "regurgiation of food"    Initial:133 lb in our office in 6/2018  Current: 142 lb (planned weight gain)    Keron:130 lb-7/13/2018 in our office  Current BMI is Body mass index is 25 15 kg/m²      Tolerating a regular diet-yes  Eating at least 60 grams of protein per day-yes  Following 30/60 minute rule with liquids-yes  Drinking at least 64 ounces of fluid per day-yes  Drinking carbonated beverages-yes  Sufficient exercise-walking  Using NSAIDs regularly-no  Using nicotine-no  Using alcohol-no  She is vaping medical marijuanna daily

## 2019-09-20 NOTE — ASSESSMENT & PLAN NOTE
She reports she has wedge pillow to keep her head elevated at night but reports she has regular, though intermittent nausea/vomiting/and some food regurgitation which wakes her up at night  She denies any abdominal pain with this but she did have mild tenderness to palpation to mid-epigastric area and notes her larger well-healed vertical mid-line scar has always been sensitive to touch  Denies fever or chills with her symptoms  She is taking omeprazole twice daily  She is doing better with meals during the day  She has actually regained a net 11 lb from last year (planned)    She reported that she thought vomiting/regurgiation was "normal" after the gastric sleeve and she did not contact us to let us know that she was having these symptoms  Advised her that this is not normal and that we should do work-up for this and she is agreeable to same  Advised to avoid eating/drinking within 2 hours of laying flat and advised on diet to help control GERD symptoms  PLAN: Will start with UGI to assess her anatomy and assess reflux  Contingent on these results advised her that she will likely need an upper endoscopy +/- bravo and then I will refer her to Dr Betsy Ceja for further assessment and plan  She will continue on ppi twice daily but I am also adding carafate 4 times per day now until further work-up is completed  She is agreeable to proceed with the plan    UGI has been ordered

## 2019-09-20 NOTE — ASSESSMENT & PLAN NOTE
Malabsorption- patient is at risk for malabsorption of vitamins/minerals secondary to malabsorption from her procedure and restriction of intakes  Reviewed current supplements and advised on same    She is on a regular vitamin regimen  She is taking 2 regular gummie mvi but is adding an extra thiamine daily  She is taking 2 ? Petite calcium chews three times per day  And an extra 2000 IU vitamin D3 daily  Also taking an extra vitamin B12- she is unsure of dosing    She is due for routine vitamin/mineral levels so will check these now

## 2019-09-20 NOTE — PROGRESS NOTES
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  Last year    She was able to transition to oral intakes only last year  She is here for routine annual follow-up now  She is reporting since later in 2018 she has had intermittent nausea/vomiting and regurgitation of food at night-she notes she "thought this was normal" after the sleeve gastrectomy and did not contact us between visits  See under "regurgiation of food"    Initial:133 lb in our office in 6/2018  Current: 142 lb (planned weight gain)    Keron:130 lb-7/13/2018 in our office  Current BMI is Body mass index is 25 15 kg/m²  Tolerating a regular diet-yes  Eating at least 60 grams of protein per day-yes  Following 30/60 minute rule with liquids-yes  Drinking at least 64 ounces of fluid per day-yes  Drinking carbonated beverages-yes  Sufficient exercise-walking  Using NSAIDs regularly-no  Using nicotine-no  Using alcohol-no  She is vaping medical marijuanna daily      Regurgitation of food  She reports she has wedge pillow to keep her head elevated at night but reports she has regular, though intermittent nausea/vomiting/and some food regurgitation which wakes her up at night  She denies any abdominal pain with this but she did have mild tenderness to palpation to mid-epigastric area and notes her larger well-healed vertical mid-line scar has always been sensitive to touch  Denies fever or chills with her symptoms  She is taking omeprazole twice daily    She is doing better with meals during the day  She has actually regained a net 11 lb from last year (planned)    She reported that she thought vomiting/regurgiation was "normal" after the gastric sleeve and she did not contact us to let us know that she was having these symptoms  Advised her that this is not normal and that we should do work-up for this and she is agreeable to same  Advised to avoid eating/drinking within 2 hours of laying flat and advised on diet to help control GERD symptoms  PLAN: Will start with UGI to assess her anatomy and assess reflux  Contingent on these results advised her that she will likely need an upper endoscopy +/- bravo and then I will refer her to Dr Amos Campos for further assessment and plan  She will continue on ppi twice daily but I am also adding carafate 4 times per day now until further work-up is completed  She is agreeable to proceed with the plan  UGI has been ordered    Postgastrectomy malabsorption  Malabsorption- patient is at risk for malabsorption of vitamins/minerals secondary to malabsorption from her procedure and restriction of intakes  Reviewed current supplements and advised on same    She is on a regular vitamin regimen  She is taking 2 regular gummie mvi but is adding an extra thiamine daily  She is taking 2 ? Petite calcium chews three times per day  And an extra 2000 IU vitamin D3 daily  Also taking an extra vitamin B12- she is unsure of dosing    She is due for routine vitamin/mineral levels so will check these now  Gastroesophageal reflux disease  Currently on ppi twice daily       Diagnoses and all orders for this visit:    Bariatric surgery status  -     CBC and Platelet; Future  -     Copper Level; Future  -     Ferritin; Future  -     Folate; Future  -     Iron Saturation %; Future  -     Vitamin A; Future  -     Vitamin B1, whole blood; Future  -     Vitamin B12; Future  -     Vitamin D 25 hydroxy;  Future  -     Zinc; Future  -     FL upper GI UGI; Future  -     sucralfate (CARAFATE) 1 g/10 mL suspension; Take 10 mL (1 g total) by mouth 4 (four) times a day    Postsurgical malabsorption  -     CBC and Platelet; Future  -     Copper Level; Future  -     Ferritin; Future  -     Folate; Future  -     Iron Saturation %; Future  -     Vitamin A; Future  -     Vitamin B1, whole blood; Future  -     Vitamin B12; Future  -     Vitamin D 25 hydroxy; Future  -     Zinc; Future    Regurgitation of food  -     FL upper GI UGI; Future  -     sucralfate (CARAFATE) 1 g/10 mL suspension; Take 10 mL (1 g total) by mouth 4 (four) times a day    Nausea and vomiting  -     FL upper GI UGI; Future  -     sucralfate (CARAFATE) 1 g/10 mL suspension; Take 10 mL (1 g total) by mouth 4 (four) times a day    Gastroesophageal reflux disease without esophagitis    Postgastrectomy malabsorption    Other orders  -     Cyanocobalamin (VITAMIN B 12 PO); Take by mouth  -     BIOTIN PO; Take by mouth          Subjective:      Patient ID: Margaux Sun is a 39 y o  female  She is here in routine follow-up  She is eating a regular diet  She is complaining of several month history of nocturnal intermittent nausea/vomiting/regurgiation of food  She notes she wakes up with this at times she is taking omeprazole twice daily  She is on a regular vitamin regimen  She is walking for exercise  She has gained some weight back (planned)      The following portions of the patient's history were reviewed and updated as appropriate: allergies, current medications, past family history, past medical history, past social history, past surgical history and problem list     Review of Systems   Constitutional: Negative for chills and fever  Unexpected weight change: planned weight loss  Thin appearing   Respiratory: Negative for shortness of breath and wheezing  Cardiovascular: Negative for chest pain and palpitations     Gastrointestinal: Negative for abdominal pain, constipation, diarrhea, nausea and vomiting  Notes her long incisional scar is sensitive to touch    Psychiatric/Behavioral: Negative for suicidal ideas (+ anxiety and depressed mood at times-she feels she is coping ok with this and declined follow-up with our )  Objective:      /70 (BP Location: Right arm, Patient Position: Sitting)   Pulse 65   Temp 98 5 °F (36 9 °C) (Tympanic)   Ht 5' 3" (1 6 m)   Wt 64 4 kg (142 lb)   LMP  (LMP Unknown)   BMI 25 15 kg/m²          Physical Exam   Constitutional: She is oriented to person, place, and time  She appears well-developed and well-nourished  HENT:   Mouth/Throat: Oropharynx is clear and moist    Eyes: Conjunctivae are normal  No scleral icterus  Cardiovascular: Normal rate and regular rhythm  Pulmonary/Chest: Effort normal and breath sounds normal    Abdominal: Soft  Bowel sounds are normal  There is tenderness  There is no rebound and no guarding  No incisional hernias appreciated  + large well-healed vertical mid-line scar  + tenderness to palpation to mid-epigastric area   Musculoskeletal:   Normal gait   Neurological: She is alert and oriented to person, place, and time  Psychiatric: She has a normal mood and affect  Her behavior is normal  Judgment and thought content normal    Nursing note and vitals reviewed          Goals:  Maintain weight loss with good nutrition intakes  Normal vitamin and mineral levels  Exercise as tolerated  Resolve nausea/vomiting/regurgiation

## 2019-09-20 NOTE — PATIENT INSTRUCTIONS
Get upper GI study and then I will call you to schedule an upper endoscopy +/- ph testing and then follow-up with Dr Johnathon Freeman after that  Continue omeprazole before breakfast and before dinner daily  I sent a prescription to your pharmacy for carafate/sucralfate liquid-take before all meals and bedtime daily    Try to sleep with head of bed up/wedge pillow  Avoid eating/drinking within 2 hours to help avoid nausea/vomiting/regurgiation of food  Avoid foods that make heart burn worse-like chocolate, high caffeine intakes-regular coffee, regular tea, chili powder, pepper, peppers, high citrus intakes like tomato and tomato products, oranges,etc     Follow-up in one year for your routine visit    We kindly ask that you arrive 15 minutes before your scheduled appointment time with your provider to allow you to be roomed, have your vital signs checked and your chart updated by our staff  We thank you for your patience at your visit  Follow diet as discussed  Follow  vitamin and mineral recommendations as reviewed with you  Bariatric vitamins are highly recommended  Vitamins are important for a life-time  Low levels can lead to deficiency which can lead to other medical problems  Exercise as tolerated    If you have gotten a lab slip at this visit, please note that most labs are FASTING-but you need to drink water the night before and the morning before your labs are done  It is HIGHLY RECOMMENDED that you check with your insurance to make sure all the labs ordered are covered by your individual insurance policy  This is especially important if you also get labs done by other providers outside of [facility]  You want to avoid having duplicate labs done  Note you will be given a lab slip AFTER  your annual visit next year to check your vitamin and mineral levels  You will not need to make a second appointment after the labs are received/reviewed   You will receive a letter/and or phone call with your results  Most labs do NOT honor a lab slip dated one year in advance now  Call our office if he have any problems with abdominal pain especially if associated with fever, chills, nausea, vomiting or any other concerns  All  Post-bariatric surgery patients should be aware that very small quantities of any alcohol  can cause impairment and it is very possible not to feel the effect  The effect can be in the system for several hours  It is also a stomach irritant  It is advised to AVOID alcohol, Nonsteroidal antiinflammatory drugs (NSAIDS) and nicotine of all forms   Any of these can cause stomach irritation/pain

## 2019-09-30 ENCOUNTER — HOSPITAL ENCOUNTER (OUTPATIENT)
Dept: RADIOLOGY | Facility: HOSPITAL | Age: 44
Discharge: HOME | End: 2019-09-30
Payer: COMMERCIAL

## 2019-09-30 DIAGNOSIS — R11.10 REGURGITATION OF FOOD: ICD-10-CM

## 2019-09-30 DIAGNOSIS — R11.2 NAUSEA AND VOMITING: ICD-10-CM

## 2019-09-30 DIAGNOSIS — Z98.84 BARIATRIC SURGERY STATUS: ICD-10-CM

## 2019-09-30 PROCEDURE — 74240 X-RAY XM UPR GI TRC 1CNTRST: CPT

## 2019-10-01 ENCOUNTER — DOCUMENTATION (OUTPATIENT)
Dept: BARIATRICS | Facility: CLINIC | Age: 44
End: 2019-10-01

## 2019-10-01 NOTE — PROGRESS NOTES
Patient is penciled in for an EGD WITH BRAVO on 10/30/2019 - pt was instructed to stop by the office to sign a consent - receive instructions - and will schedule her follow up with Dr Froilan Vann at that time

## 2019-10-30 ENCOUNTER — ANESTHESIA (OUTPATIENT)
Dept: GASTROENTEROLOGY | Facility: HOSPITAL | Age: 44
End: 2019-10-30

## 2019-10-30 ENCOUNTER — HOSPITAL ENCOUNTER (OUTPATIENT)
Dept: GASTROENTEROLOGY | Facility: HOSPITAL | Age: 44
Setting detail: OUTPATIENT SURGERY
Discharge: HOME | End: 2019-10-30
Attending: SURGERY | Admitting: SURGERY
Payer: COMMERCIAL

## 2019-10-30 ENCOUNTER — ANESTHESIA EVENT (OUTPATIENT)
Dept: GASTROENTEROLOGY | Facility: HOSPITAL | Age: 44
End: 2019-10-30

## 2019-10-30 VITALS
OXYGEN SATURATION: 100 % | DIASTOLIC BLOOD PRESSURE: 57 MMHG | HEIGHT: 63 IN | WEIGHT: 137 LBS | HEART RATE: 54 BPM | SYSTOLIC BLOOD PRESSURE: 108 MMHG | BODY MASS INDEX: 24.27 KG/M2 | RESPIRATION RATE: 20 BRPM | TEMPERATURE: 98.9 F

## 2019-10-30 DIAGNOSIS — Z98.84 BARIATRIC SURGERY STATUS: ICD-10-CM

## 2019-10-30 DIAGNOSIS — K91.2 POSTGASTRECTOMY MALABSORPTION: Primary | Chronic | ICD-10-CM

## 2019-10-30 DIAGNOSIS — Z90.3 POSTGASTRECTOMY MALABSORPTION: Primary | Chronic | ICD-10-CM

## 2019-10-30 PROCEDURE — 43235 EGD DIAGNOSTIC BRUSH WASH: CPT | Performed by: SURGERY

## 2019-10-30 RX ORDER — PROPOFOL 10 MG/ML
INJECTION, EMULSION INTRAVENOUS AS NEEDED
Status: DISCONTINUED | OUTPATIENT
Start: 2019-10-30 | End: 2019-10-30 | Stop reason: SURG

## 2019-10-30 RX ORDER — SODIUM CHLORIDE 9 MG/ML
125 INJECTION, SOLUTION INTRAVENOUS CONTINUOUS
Status: DISCONTINUED | OUTPATIENT
Start: 2019-10-30 | End: 2019-11-03 | Stop reason: HOSPADM

## 2019-10-30 RX ORDER — ONDANSETRON 2 MG/ML
4 INJECTION INTRAMUSCULAR; INTRAVENOUS ONCE AS NEEDED
Status: DISCONTINUED | OUTPATIENT
Start: 2019-10-30 | End: 2019-11-03 | Stop reason: HOSPADM

## 2019-10-30 RX ADMIN — PROPOFOL 50 MG: 10 INJECTION, EMULSION INTRAVENOUS at 11:22

## 2019-10-30 RX ADMIN — LIDOCAINE HYDROCHLORIDE 100 MG: 20 INJECTION, SOLUTION INTRAVENOUS at 11:18

## 2019-10-30 RX ADMIN — SODIUM CHLORIDE 125 ML/HR: 0.9 INJECTION, SOLUTION INTRAVENOUS at 11:03

## 2019-10-30 RX ADMIN — PROPOFOL 50 MG: 10 INJECTION, EMULSION INTRAVENOUS at 11:20

## 2019-10-30 RX ADMIN — PROPOFOL 100 MG: 10 INJECTION, EMULSION INTRAVENOUS at 11:18

## 2019-10-30 NOTE — ANESTHESIA PREPROCEDURE EVALUATION
Review of Systems/Medical History  Patient summary reviewed  Chart reviewed  No history of anesthetic complications     Cardiovascular   Pulmonary  Smoker ex-smoker  ,        GI/Hepatic    GERD , Bariatric surgery,   Comment: Postgastrectomy malabsorption     Negative  ROS        Endo/Other  Negative endo/other ROS      GYN       Hematology   Musculoskeletal       Neurology    Headaches (s/p  shunt for pseudotumor cerebri),   Comment: Idiopathic intracranial hypertension  Visual field defect Psychology   Depression ,              Physical Exam    Airway    Mallampati score: II  TM Distance: >3 FB  Neck ROM: full     Dental   No notable dental hx     Cardiovascular  Rhythm: regular, Rate: normal, Cardiovascular exam normal    Pulmonary  Pulmonary exam normal Breath sounds clear to auscultation,     Other Findings        Anesthesia Plan  ASA Score- 3     Anesthesia Type- general with ASA Monitors  Additional Monitors:   Airway Plan:         Plan Factors-    Induction- intravenous  Postoperative Plan-     Informed Consent- Anesthetic plan and risks discussed with patient

## 2019-10-30 NOTE — H&P
H&P EXAM - Outpatient Endoscopy  MOHSEN Rain [id number] [facility] GI LAB INTRA   Vidhi Alcala 38 y o  female MRN: [id number]  Unit/Bed#:  Encounter: [id number]        Impression:  Heartburn status post laparoscopic sleeve gastrectomy    Plan:Upper endoscopy and Bravo    Chief Complaint:  Heartburn    Physical Exam: Normal not in acute distress   Chest: Clear to auscultation   Heart: Normal S1 and S2

## 2019-10-30 NOTE — PROGRESS NOTES
D/C instructions including BRAVO given to pt and  who verbalize understanding  OOB to BR gait steady denies dizziness

## 2019-10-30 NOTE — DISCHARGE INSTRUCTIONS
Esophagitis   WHAT YOU NEED TO KNOW:   Esophagitis is inflammation or irritation of the lining of the esophagus  DISCHARGE INSTRUCTIONS:   Call 911 for any of the following:   · You have chest pain that does not go away within a few minutes or gets worse  Seek care immediately if:   · You feel like you have food stuck in your throat and you cannot cough it out  Contact your healthcare provider if:   · You have new or worsening symptoms, even after treatment  · You have questions or concerns about your condition or care  Medicines:   · Medicines  may be given to fight an infection or to control stomach acid  · Take your medicine as directed  Contact your healthcare provider if you think your medicine is not helping or if you have side effects  Tell him or her if you are allergic to any medicine  Keep a list of the medicines, vitamins, and herbs you take  Include the amounts, and when and why you take them  Bring the list or the pill bottles to follow-up visits  Carry your medicine list with you in case of an emergency  Follow up with your healthcare provider as directed: You may need ongoing tests or treatment  Write down your questions so you remember to ask them during your visits  Do not smoke:  Nicotine and other chemicals in cigarettes and cigars can cause blood vessel and lung damage  Ask your healthcare provider for information if you currently smoke and need help to quit  E-cigarettes or smokeless tobacco still contain nicotine  Talk to your healthcare provider before you use these products  Do not drink alcohol:  Alcohol can irritate your esophagus  Talk to your healthcare provider if you need help to stop drinking  Keep batteries and similar objects out of the reach of children:  Babies often put items in their mouths to explore them  Button batteries are easy to swallow and can cause serious damage   Keep the battery covers of electronic devices such as remote controls taped closed  Store all batteries and toxic materials where children cannot get to them  Use childproof locks to keep children away from dangerous materials  Manage or prevent esophagitis:   · Limit or do not eat foods that can lead to esophagitis  Foods such as oranges and salsa can irritate your esophagus  Caffeine and chocolate can cause acid reflux  High-fat and fried foods make your stomach digest food more slowly  This increases the amount of stomach acid your esophagus is exposed to  Eat small meals, and drink water with your meals  Soft foods such as yogurt and applesauce may help soothe your throat  Do not eat for at least 3 hours before you go to bed  · Prevent acid reflux  Do not bend over unless it is necessary  Acid may back up into your esophagus when you bend over  If possible, keep the head of your bed elevated while you sleep  This will help keep acid from backing up  Manage stress  Stress can make your symptoms worse or cause stomach acid to back up  · Drink more liquid when you take pills  Drink a full glass of water when you take your pills  Ask your healthcare provider if you can take your pills at least an hour before you go to bed  © 2017 [address] [facility] Information is for End User's use only and may not be sold, redistributed or otherwise used for commercial purposes  All illustrations and images included in CareNotes® are the copyrighted property of [employer] , [employer]  or Donald Villa  The above information is an  only  It is not intended as medical advice for individual conditions or treatments  Talk to your doctor, nurse or pharmacist before following any medical regimen to see if it is safe and effective for you  Upper Endoscopy   WHAT YOU NEED TO KNOW:   An upper endoscopy is also called an upper gastrointestinal (GI) endoscopy, or an esophagogastroduodenoscopy (EGD)   You may feel bloated, gassy, or have some abdominal discomfort after your procedure  Your throat may be sore for 24 to 36 hours  You may burp or pass gas from air that is still inside your body  DISCHARGE INSTRUCTIONS:   Call 911 for any of the following:   · You have sudden chest pain or trouble breathing  Seek care immediately if:   · You feel dizzy or faint  · You have trouble swallowing  · Your bowel movements are very dark or black  · Your abdomen is hard and firm and you have severe pain  · You vomit blood  Contact your healthcare provider if:   · You feel full or bloated and cannot burp or pass gas  · You have not had a bowel movement for 3 days after your procedure  · You have neck pain  · You have a fever or chills  · You have nausea or are vomiting  · You have a rash or hives  · You have questions or concerns about your endoscopy  Relieve a sore throat:  Suck on throat lozenges or crushed ice  Gargle with a small amount of warm salt water  Mix 1 teaspoon of salt and 1 cup of warm water to make salt water  Relieve gas and discomfort from bloating:  Lie on your right side with a heating pad on your abdomen  Take short walks to help pass gas  Eat small meals until bloating is relieved  Rest after your procedure: You have been given medicine to relax you  Do not  drive or make important decisions until the day after your procedure  Return to your normal activity as directed  You can usually return to work the day after your procedure  Follow up with your healthcare provider as directed:  Write down your questions so you remember to ask them during your visits  © 2017 [address] [employer] is for End User's use only and may not be sold, redistributed or otherwise used for commercial purposes  All illustrations and images included in CareNotes® are the copyrighted property of [employer]  or Donald Villa  The above information is an  only   It is not intended as medical advice for individual conditions or treatments  Talk to your doctor, nurse or pharmacist before following any medical regimen to see if it is safe and effective for you

## 2019-11-04 ENCOUNTER — TELEPHONE (OUTPATIENT)
Dept: BARIATRICS | Facility: CLINIC | Age: 44
End: 2019-11-04

## 2019-11-08 ENCOUNTER — DOCUMENTATION (OUTPATIENT)
Dept: OTHER | Facility: HOSPITAL | Age: 44
End: 2019-11-08

## 2019-11-08 NOTE — PROGRESS NOTES
Bravo pH Probe Study Report    Pre op Dx:     Heartburn, chest pain and regurgitation    Post Op Dx:    Abnormal study      Interpretation of ambulatory pH monitoring      The patient's tracings and  were reviewed by myself and were found to be adequate for analysis  The patient had a Bravo study performed on 2 consecutive days  Day 2 was chosen because it was the most significant day  On day 2 the patient's DeMeester score was 69 5 which was significant  Her symptom association probability (SAP) for heartburn, chest pain and regurgitation were 100%, 95% and 65 1%  Her symptom index for reflux (SI) for heartburn, chest pain and regurgigation were 70, 55 6 and 0 respectively  SAP and SI are significant for heartburn and chest pain  Total percent time spent in reflux was 19 5%  CONCLUSION    The patient had a significantly elevated DeMeester score at 69 5  with significant SAP and SI for heartburn and chest pain  In addition, the patient's percent time spent in reflux was 19 5% which was also significant  The study is POSITIVE for GERD

## 2019-11-25 DIAGNOSIS — E66.01 MORBID (SEVERE) OBESITY DUE TO EXCESS CALORIES (HCC): ICD-10-CM

## 2019-11-25 RX ORDER — OMEPRAZOLE 40 MG/1
40 CAPSULE, DELAYED RELEASE ORAL 2 TIMES DAILY
Qty: 60 CAPSULE | Refills: 0 | Status: SHIPPED | OUTPATIENT
Start: 2019-11-25 | End: 2019-12-29 | Stop reason: SDUPTHER

## 2019-12-02 ENCOUNTER — TELEPHONE (OUTPATIENT)
Dept: NEUROSURGERY | Facility: CLINIC | Age: 44
End: 2019-12-02

## 2019-12-02 DIAGNOSIS — G93.2 PSEUDOTUMOR CEREBRI: Primary | ICD-10-CM

## 2019-12-03 ENCOUNTER — DOCUMENTATION (OUTPATIENT)
Dept: NEUROSURGERY | Facility: CLINIC | Age: 44
End: 2019-12-03

## 2019-12-03 NOTE — PROGRESS NOTES
PEER TO PEER WAS COMPLETED 12/3/19       WITH DR Virgen Jacobs       MRI BRAIN WITH WITHOUT CONTRAST       NOT APPROVED   DENIED     DR Richar Thrasher reports that with no new reported symptoms or change in symptoms MRI is not approved, she advises keep planned follow-up December 2019 should there be any interval changes, return of symptoms, new symptoms, or new findings, such as changes on a redness evaluation by Ophthalmology, than MRI brain with and without contrast should be ordered

## 2019-12-04 ENCOUNTER — OFFICE VISIT (OUTPATIENT)
Dept: BARIATRICS | Facility: CLINIC | Age: 44
End: 2019-12-04
Payer: COMMERCIAL

## 2019-12-04 VITALS
WEIGHT: 136.5 LBS | TEMPERATURE: 96.6 F | DIASTOLIC BLOOD PRESSURE: 70 MMHG | BODY MASS INDEX: 24.19 KG/M2 | SYSTOLIC BLOOD PRESSURE: 140 MMHG | HEIGHT: 63 IN | HEART RATE: 73 BPM

## 2019-12-04 DIAGNOSIS — Z98.84 BARIATRIC SURGERY STATUS: Primary | ICD-10-CM

## 2019-12-04 DIAGNOSIS — K21.00 GASTROESOPHAGEAL REFLUX DISEASE WITH ESOPHAGITIS: ICD-10-CM

## 2019-12-04 PROCEDURE — 99214 OFFICE O/P EST MOD 30 MIN: CPT | Performed by: SURGERY

## 2019-12-04 NOTE — PROGRESS NOTES
Kofi Figueredo 38 y o  female MRN: [id number]  Unit/Bed#:  Encounter: [id number]      HPI:  Kofi Figueredo is a 38 y o  female status post-laparoscopic sleeve gastrectomy by Dr Jane Chew complicated by a leak at her GE junction with open abdomen and prolonged hospitalization throughout 8244 for a complicated leak status post sleeve gastrectomy at an outside hospital  She had required nasojejunal tube feedings for nutrition support at that time which she was subsequently discontinued  She also had required the placement of a  pad lock device for  endoscopic closure of her fistula tract  She now presents with symptoms of gastroesophageal reflux that have persisted since the time of her discharge from her previous hospitalization  She complains of heartburn and regurgitation that has been persistent despite BID PPI therapy  She underwent EGD with Bravo placement on 10/30/2019  EGD demonstrated:    · Signs of previous sleeve gastrectomy  Paraesophageal hernia repair complicated by a leak at the GE junction that was controlled endoscopically with a padlock device  · Regular Z-line 34 cm from the incisors  Successful deployment of Bravo at 28 cm  · Erythematous mucosa and linear furrows in the lower third of the esophagus  LA grade B    Bravo ambulatory pH monitoring demonstrated: The patient had a significantly elevated DeMeester score at 69 5  with significant SAP and SI for heartburn and chest pain  In addition, the patient's percent time spent in reflux was 19 5% which was also significant  She presents office today discuss results from her previous workup as well as discussion of options and management her gastroesophageal reflux  Review of Systems   Constitutional: Negative  HENT: Negative  Eyes: Negative  Respiratory: Negative  Cardiovascular: Negative      Gastrointestinal:        As per HPI, symptoms of gastroesophageal reflux with regurgitation and heartburn   Endocrine: Negative  Genitourinary: Negative  Musculoskeletal: Negative  Skin: Negative  Allergic/Immunologic: Negative  Neurological: Negative  Hematological: Negative  Psychiatric/Behavioral: Negative  All other systems reviewed and are negative  Historical Information   Past Medical History:   Diagnosis Date    Abdominal pain     Brain condition     Pseudotumor Cerebri     Chronic kidney disease     De Quervain's disease (tenosynovitis)     Depression 1/10/2019    Esophageal perforation     Gastric leak     GERD (gastroesophageal reflux disease)     Idiopathic intracranial hypertension     Kidney stone     Migraine     Obesity     Papilledema, both eyes     Postgastrectomy malabsorption     Presence of lumboperitoneal shunt     Resolved: Sep 20, 2017    Rotator cuff tendinitis     Resolved: Aug 23, 2017    Visual field defect      Past Surgical History:   Procedure Laterality Date    CSF SHUNT      LP shunt - 2015 -  shunt - 2017    ESOPHAGOGASTRODUODENOSCOPY N/A 2/23/2018    Procedure: ESOPHAGOGASTRODUODENOSCOPY (EGD) WITH ESOPHAGEAL STENT PLACEMENT;  Surgeon: Sly Molina MD;  [facility];  Service: Thoracic    ESOPHAGOGASTRODUODENOSCOPY N/A 2/23/2018    Procedure: ESOPHAGOGASTRODUODENOSCOPY (EGD) WITH REMOVAL ESOPHAGEAL STENT  AND REPLACEMENT WITH 23mm X155mm [address];  Surgeon: Sly Molina MD;  [facility];  Service: Thoracic    ESOPHAGOGASTRODUODENOSCOPY N/A 3/28/2018    Procedure: ESOPHAGOGASTRODUODENOSCOPY (EGD) with PEJ placement ;  Surgeon: Brandy Adams MD;  [facility]; Service: Gastroenterology    ESOPHAGOGASTRODUODENOSCOPY N/A 4/5/2018    Procedure: ESOPHAGOGASTRODUODENOSCOPY (EGD) with botox injection and kaofed placement;  Surgeon: Ursula Urrutia DO;  [facility];   Service: Gastroenterology    ESOPHAGOGASTRODUODENOSCOPY N/A 4/10/2018    Procedure: ESOPHAGOGASTRODUODENOSCOPY (EGD) with Sarath Pablo placement;  Surgeon: Genoveva Arteaga MD;  [facility]; Service: Gastroenterology    ESOPHAGOSCOPY WITH STENT INSERTION N/A 1/24/2018    Procedure: INSERTION STENT ESOPHAGEAL;  Surgeon: Tracey Cardona MD;  [facility]; Service: Gastroenterology    EYE SURGERY Right 1980    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)     GASTRIC BYPASS  12/19/2016    Lap sleeve gastrectomy w/shunt length shortening procedure    HERNIA REPAIR      HYSTERECTOMY  03/14/2013    IR LUMBAR PUNCTURE  8/29/2018    LAPAROTOMY N/A 1/25/2018    Procedure: Exploratory Laparotomy, wash out,placement of drains, placement of NG feeding tube ; Surgeon: Lianna Cosme DO;  [facility];  Service: General    LUMBAR PERITONEAL SHUNT  11/19/2015    Laparoscopic assisted    TX CREATE SHUNT:VENTRIC-PERITONEAL Right 5/31/2017    Procedure: IMAGE GUIDED CORONAL PLACEMENT OF PROGRAMABLE VENTRICULAR-PERITONEAL SHUNT, REMOVAL OF LP SHUNT ;  Surgeon: Natalie Jennings MD;  [facility];  Service: Neurosurgery    TX EGD TRANSORAL BIOPSY SINGLE/MULTIPLE N/A 6/27/2018    Procedure: ESOPHAGOGASTRODUODENOSCOPY (EGD) with padlock clip placement;  Surgeon: Carissa Garza MD;  [facility];   Service: Glennie Iron CSF SHUNT,W/O REPLACE Right 2/5/2018    Procedure: Removal of  shunt;  Surgeon: Natalie Jennings MD;  [facility];  Service: Neurosurgery    TX REPLACEMENT/REVISION,CSF SHUNT Right 1/25/2018    Procedure: Externalization of right-sided SHUNT VENTRICULAR-PERITONEAL in anterior chest wall ribs two and three level  ;  Surgeon: Kathleen Cohn MD;  [facility];  Service: Neurosurgery    WRIST SURGERY Right     x3 2006, 2008     Social History   Social History     Substance and Sexual Activity   Alcohol Use No     Social History     Substance and Sexual Activity   Drug Use Yes    Frequency: 7 0 times per week    Types: Marijuana    Comment: medical marijuana Social History     Tobacco Use   Smoking Status Former Smoker    Packs/day: 0 50    Years: 20 00    Pack years: 10 00    Last attempt to quit: 2012    Years since quittin 2   Smokeless Tobacco Never Used   Tobacco Comment    Per [employer]: Never a smoker     Family History: non-contributory    Meds/Allergies   all medications and allergies reviewed  Allergies   Allergen Reactions    Benadryl [Diphenhydramine] Anaphylaxis     Throat closing    Phenergan [Promethazine] Anaphylaxis       Objective     Current Vitals:   Blood Pressure: 140/70 (19 1131)  Pulse: 73 (19 1131)  Temperature: (!) 96 6 °F (35 9 °C) (19 113)  Temp Source: Tympanic (19)  Height: 5' 3" (160 cm) (19)  Weight - Scale: 61 9 kg (136 lb 8 oz) (19)    [unfilled]    Invasive Devices     None                 Physical Exam   Constitutional: She is oriented to person, place, and time  She appears well-developed and well-nourished  Eyes: Pupils are equal, round, and reactive to light  Conjunctivae and EOM are normal    Neck: Normal range of motion  Neck supple  Cardiovascular: Normal rate, regular rhythm and normal heart sounds  Pulmonary/Chest: Effort normal and breath sounds normal    Abdominal:   Abdomen soft, nondistended, nontender, well healed midline abdominal scar and laparoscopic scars   Musculoskeletal: Normal range of motion  Neurological: She is alert and oriented to person, place, and time  Skin: Skin is warm and dry  Psychiatric: She has a normal mood and affect   Her behavior is normal  Judgment and thought content normal          Imaging:     UPPER GI SERIES  SINGLE CONTRAST     INDICATION:  Prior sleeve gastrectomy      COMPARISON:  2018     IMAGES:  36     FLUOROSCOPY TIME:   3 1 minutes     TECHNIQUE:  The patient was given barium by mouth and images of the esophagus, stomach, and small bowel were obtained       FINDINGS:     Stable postoperative appearance of the GE junction and stomach  Esophageal motility is normal and emptying of contrast from the esophagus is prompt  There is no mucosal mass, ulceration or fold thickening identified      Stomach is small in size but unchanged in configuration  No extravasation  The gastric mucosa is normal   No penetrating ulcers or masses      Contrast empties promptly into the duodenum  The duodenum is normal in caliber  The ligament of Treitz/duodenojejunal junction lies in a normal position      Gastroesophageal reflux was observed near the completion of the examination, grade 3 above the tracheal bifurcation      There is no hiatal hernia         IMPRESSION:     Stable postoperative appearance of the stomach status post sleeve gastrectomy  No stricture, ulceration or mass identified      Grade 3 gastroesophageal reflux was demonstrated towards the completion of this examination  Code Status: Full Code      Assessment/Plan:  Christiana Patrick is a 38 y o  female status post-laparoscopic sleeve gastrectomy by Dr Peace Rodriguez complicated by a leak at her GE junction with open abdomen and prolonged hospitalization throughout 2018  She presents for discussion of management of her gastroesophageal reflux  Her workup was significant for:    EGD with Bravo placement on 10/30/2019  EGD demonstrated:    · Signs of previous sleeve gastrectomy  Paraesophageal hernia repair complicated by a leak at the GE junction that was controlled endoscopically with a padlock device  · Regular Z-line 34 cm from the incisors  Successful deployment of Bravo at 28 cm  · Erythematous mucosa and linear furrows in the lower third of the esophagus  LA grade B    Bravo ambulatory pH monitoring demonstrated: The patient had a significantly elevated DeMeester score at 69 5  with significant SAP and SI for heartburn and chest pain     In addition, the patient's percent time spent in reflux was 19 5% which was also significant  UGI:     Stable postoperative appearance of the stomach status post sleeve gastrectomy  No stricture, ulceration or mass identified      Grade 3 gastroesophageal reflux was demonstrated towards the completion of this examination  Extensive discussion was had with the patient regarding the above findings and options to manage her gastroesophageal reflux  Unfortunately, due to her previous surgical history, she is not a good candidate for further elective surgical intervention  Discussion was had with the patient regarding performing endoscopic options such as STRETTA  This would not be without risk given the site of her previous leak and that endoscopically placed pad lock device  Potentially, study could be done in an antegrade direction while avoiding retrograde where her previous leak site was  Patient will meet with  to discuss her options for scheduling STRETTA procedure  She will continue taking her b i d  PPI therapy  She follow-up in the office to discuss further options  Consultation examination performed with Dr Andrew Barillas

## 2019-12-05 ENCOUNTER — TELEPHONE (OUTPATIENT)
Dept: NEUROSURGERY | Facility: CLINIC | Age: 44
End: 2019-12-05

## 2019-12-05 NOTE — TELEPHONE ENCOUNTER
Received call from patient's FMD    papilledema noted    Intolerance to diamox    Plan  Start diuretic  call patient to be seen next office day

## 2019-12-09 ENCOUNTER — TELEPHONE (OUTPATIENT)
Dept: NEUROSURGERY | Facility: CLINIC | Age: 44
End: 2019-12-09

## 2019-12-09 NOTE — TELEPHONE ENCOUNTER
----- Message from Kristine Webster MD sent at 12/5/2019  4:23 PM EST -----  Patient needs to be seen next week    Papilledema noted

## 2019-12-10 ENCOUNTER — OFFICE VISIT (OUTPATIENT)
Dept: NEUROSURGERY | Facility: CLINIC | Age: 44
End: 2019-12-10
Payer: COMMERCIAL

## 2019-12-10 VITALS
TEMPERATURE: 97.7 F | RESPIRATION RATE: 16 BRPM | DIASTOLIC BLOOD PRESSURE: 84 MMHG | WEIGHT: 135 LBS | HEIGHT: 63 IN | HEART RATE: 58 BPM | SYSTOLIC BLOOD PRESSURE: 122 MMHG | BODY MASS INDEX: 23.92 KG/M2

## 2019-12-10 DIAGNOSIS — H47.10 PAPILLEDEMA: ICD-10-CM

## 2019-12-10 DIAGNOSIS — G93.2 PSEUDOTUMOR CEREBRI: Primary | ICD-10-CM

## 2019-12-10 PROCEDURE — 99215 OFFICE O/P EST HI 40 MIN: CPT | Performed by: NEUROLOGICAL SURGERY

## 2019-12-10 RX ORDER — CHLORHEXIDINE GLUCONATE 0.12 MG/ML
15 RINSE ORAL ONCE
Status: CANCELLED | OUTPATIENT
Start: 2019-12-10 | End: 2019-12-10

## 2019-12-10 RX ORDER — SPIRONOLACTONE 25 MG/1
12.5 TABLET ORAL DAILY
COMMUNITY
Start: 2019-12-05 | End: 2020-02-05

## 2019-12-10 RX ORDER — CEFAZOLIN SODIUM 1 G/50ML
1000 SOLUTION INTRAVENOUS ONCE
Status: CANCELLED | OUTPATIENT
Start: 2019-12-10 | End: 2019-12-10

## 2019-12-10 NOTE — H&P (VIEW-ONLY)
[facility] Office Note  Edyta Bhandari is a 38 y o  female      Visit Type: Follow-up      Diagnoses and all orders for this visit:    Pseudotumor cerebri  -     Case request operating room:  Image guided INSERTION SHUNT VENTRICULAR-ATRIAL - right coronal; Standing  -     Case request operating room: INSERTION SHUNT VENTRICULAR-ATRIAL - right coronal    Papilledema  -     Case request operating room: [address] [place] [address] - right coronal; Standing  -     Case request operating room: INSERTION SHUNT VENTRICULAR-ATRIAL - right coronal    Other orders  -     spironolactone (ALDACTONE) 25 mg tablet; Take 12 5 mg by mouth daily   -     Diet NPO; Sips with meds; Standing  -     Nursing Communication [address] [place] [address] Interventions Implemented; Standing  -     Nursing Communication Use 2 CHG cloths, have staff wash the entire body (neck down) ; Standing  -     Nursing Communication Swab both nares with Povidone-Iodine solution, EXCLUDE if patient has shellfish/Iodine allergy; Standing  -     chlorhexidine (PERIDEX) 0 12 % oral rinse 15 mL  -     Void on call to OR; Standing  -     Insert peripheral IV; Standing  -     ceFAZolin (ANCEF) IVPB (premix) 1,000 mg        DISCUSSION SUMMARY  This is a 50-year-old female with a very complicated past medical history who was admitted with a 5 month hospitalization for sepsis and abdominal wound infection  She has a history of pseudotumor cerebri which was initially treated with a lumbar peritoneal shunt placed by another surgeon  This failed  An image guided ventriculoperitoneal shunt was placed but had to be removed after her abdominal sepsis occurred  Unfortunately she complained of similar symptoms to her previous history of pseudotumor cerebri  She went to her ophthalmologist who found palpable edema  I confirmed this today and believe that she has symptomatic pseudotumor cerebri once again      I have recommended a ventricular atrial shunt given her history  I will attempt to contact her bariatric surgeon to discuss more thoroughly her situation  She does need to have updated imaging of the brain  We have arranged for this to occur and is scheduled to placement of her shunt to follow  Return schedule surgery  CHIEF COMPLAINT  Patient presents for follow up regarding noted papilledema    NEUROSURGERY PROCEDURES  11/19/15 (Dr Medora Sicard) 1  Diagnostic laparoscopy  2  Laparoscopic-assited placement of lumbar peritoneal shunt  11/19/15 (Dr Medora Sicard / Dr Magdalena Álvarez) Placement of a lumboperitoneal shunt system  We implanted a HV valve made by [employer]  This was a horizontal 50-80 mm of water and vertical was 230-320 mm of water  5/31/17 ([facility]) IMAGE GUIDED CORONAL PLACEMENT OF PROGRAMABLE VENTRICULAR-PERITONEAL SHUNT, REMOVAL OF LP SHUNT (Right Head)  1/25/18 ([facility] / Dr Kathe Cooper)  Externalization of right-sided SHUNT VENTRICULAR-PERITONEAL in anterior chest wall ribs two and three level   (Right Chest); Exploratory Laparotomy, wash out,placement of drains, placement of NG feeding tube  (N/A Abdomen)  2/5/18 ([facility]) Removal of  shunt (Right Head)    HISTORY OF PRESENT ILLNESS  Patient is known to our practice for a diagnosis of pseudotumor cerebri  Of course this was originally treated with a lumbar peritoneal shunt  This was pulling on lumbar nerve roots and worked intermittently and is for these reasons it had to be removed  A ventriculoperitoneal shunt was placed and was working well  Unfortunately following abdominal surgery complicated by gastric perforation and peritonitis, it was necessary to externalize and later remove the ventriculoperitoneal shunt  She was last seen in our office on 12/13/18 and she complained of a pressure-like headache but without noted visual changes   Our plan would be to observe closely the progression of her symptoms and to review her ophthalmological evaluation prior to proceeding  The potential remedies for her condition include endovascular techniques versus the placement of a ventricular atrial shunt  We had recommended an MRI brain for follow up but this was denied by insurance  A peer to peer was done with Dr Eliana Greenberg who reported that with no new reported symptoms or change in symptoms MRI is not approved, she advises keep planned follow-up December 2019 should there be any interval changes, return of symptoms, new symptoms, or new findings, such as changes on a redness evaluation by Ophthalmology, than MRI brain with and without contrast should be ordered  Received call from patient's FMD on 12/5/19; papilledema noted  There is intolerance to Diamox  Plan: Start diuretic  Patient was advised to be seen in office VICKIE Rock complains of shooting pain in her head and in the retro-orbital region  She notes some changes in her vision  She went to her ophthalmologist who found palpiledema on imaging  She complains of dizziness and increasing degrees of lightheadedness  She has some headaches and has had vomiting but she relates this to her abdominal issues  Review of Systems   Constitutional: Negative  HENT: Negative  Eyes: Negative  Respiratory: Negative  Cardiovascular: Negative  Gastrointestinal: Negative  Endocrine: Negative  Genitourinary: Negative  Musculoskeletal: Negative  Skin: Negative  Allergic/Immunologic: Negative  Neurological: Positive for headaches (pressure like feeling non-stop and worsening)  Hematological: Negative  Psychiatric/Behavioral: Negative  All other systems reviewed and are negative  I reviewed the ROS      MEDICAL HISTORY  Past Medical History:   Diagnosis Date    Abdominal pain     Brain condition     Pseudotumor Cerebri     Chronic kidney disease     De Quervain's disease (tenosynovitis)     Depression 1/10/2019    Esophageal perforation     Gastric leak     GERD (gastroesophageal reflux disease)     Idiopathic intracranial hypertension     Kidney stone     Migraine     Obesity     Papilledema, both eyes     Postgastrectomy malabsorption     Presence of lumboperitoneal shunt     Resolved: Sep 20, 2017    Rotator cuff tendinitis     Resolved: Aug 23, 2017    Visual field defect        Past Surgical History:   Procedure Laterality Date    CSF SHUNT      LP shunt - 2015 -  shunt - 2017    ESOPHAGOGASTRODUODENOSCOPY N/A 2/23/2018    Procedure: ESOPHAGOGASTRODUODENOSCOPY (EGD) WITH ESOPHAGEAL STENT PLACEMENT;  Surgeon: Keila Rdz MD;  [facility];  Service: Thoracic    ESOPHAGOGASTRODUODENOSCOPY N/A 2/23/2018    Procedure: ESOPHAGOGASTRODUODENOSCOPY (EGD) WITH REMOVAL ESOPHAGEAL STENT  AND REPLACEMENT WITH 23mm X155mm [address] [facility] [address];  Surgeon: Keila Rdz MD;  [facility];  Service: Thoracic    ESOPHAGOGASTRODUODENOSCOPY N/A 3/28/2018    Procedure: ESOPHAGOGASTRODUODENOSCOPY (EGD) with PEJ placement ;  Surgeon: Dawson Ramon MD;  [facility]; Service: Gastroenterology    ESOPHAGOGASTRODUODENOSCOPY N/A 4/5/2018    Procedure: ESOPHAGOGASTRODUODENOSCOPY (EGD) with botox injection and kaofed placement;  Surgeon: Heather Calvo DO;  [facility]; Service: Gastroenterology    ESOPHAGOGASTRODUODENOSCOPY N/A 4/10/2018    Procedure: ESOPHAGOGASTRODUODENOSCOPY (EGD) with Kaofed placement;  Surgeon: Jonathon Locke MD;  [facility]; Service: Gastroenterology    ESOPHAGOSCOPY WITH STENT INSERTION N/A 1/24/2018    Procedure: INSERTION STENT ESOPHAGEAL;  Surgeon: Roman Jackson MD;  [facility];   Service: Gastroenterology    EYE SURGERY Right 1980    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)     GASTRIC BYPASS  12/19/2016    Lap sleeve gastrectomy w/shunt length shortening procedure    HERNIA REPAIR      HYSTERECTOMY  03/14/2013    IR LUMBAR PUNCTURE  8/29/2018    LAPAROTOMY N/A 1/25/2018    Procedure: Exploratory Laparotomy, wash out,placement of drains, placement of NG feeding tube ; Surgeon: Aime Payton DO;  [facility];  Service: General    LUMBAR PERITONEAL SHUNT  2015    Laparoscopic assisted    TX CREATE SHUNT:VENTRIC-PERITONEAL Right 2017    Procedure: IMAGE GUIDED CORONAL PLACEMENT OF PROGRAMABLE VENTRICULAR-PERITONEAL SHUNT, REMOVAL OF LP SHUNT ;  Surgeon: Devon Piedra MD;  [facility];  Service: Neurosurgery    TX EGD TRANSORAL BIOPSY SINGLE/MULTIPLE N/A 2018    Procedure: ESOPHAGOGASTRODUODENOSCOPY (EGD) with padlock clip placement;  Surgeon: Edy Rich MD;  [facility];   Service: [place] CSF SHUNT,W/O REPLACE Right 2018    Procedure: Removal of  shunt;  Surgeon: Devon Piedra MD;  [facility];  Service: Neurosurgery    TX REPLACEMENT/REVISION,CSF SHUNT Right 2018    Procedure: Externalization of right-sided SHUNT VENTRICULAR-PERITONEAL in anterior chest wall ribs two and three level  ;  Surgeon: Alyson Redding MD;  [facility];  Service: Neurosurgery    WRIST SURGERY Right     x3 ,        Social History     Tobacco Use    Smoking status: Former Smoker     Packs/day: 0 50     Years: 20 00     Pack years: 10 00     Last attempt to quit: 2012     Years since quittin 2    Smokeless tobacco: Never Used    Tobacco comment: Per [employer]: Never a smoker   Substance Use Topics    Alcohol use: No    Drug use: Yes     Frequency: 7 0 times per week     Types: Marijuana     Comment: medical marijuana       Vitals:    12/10/19 1015   BP: 122/84   BP Location: Right arm   Patient Position: Sitting   Cuff Size: Standard   Pulse: 58   Resp: 16   Temp: 97 7 °F (36 5 °C)   TempSrc: Tympanic   Weight: 61 2 kg (135 lb)   Height: 5' 3" (1 6 m)         Current Outpatient Medications:     BIOTIN PO, Take by mouth daily , Disp: , Rfl:     Calcium Carb-Cholecalciferol (CALCIUM 1000 + D PO), Take 1 capsule by mouth daily, Disp: , Rfl:     Cyanocobalamin (VITAMIN B 12 PO), Take by mouth, Disp: , Rfl:     Multiple Vitamin (MULTIVITAMIN) tablet, Take 1 tablet by mouth daily, Disp: , Rfl:     omeprazole (PriLOSEC) 40 MG capsule, Take 1 capsule (40 mg total) by mouth 2 (two) times a day, Disp: 60 capsule, Rfl: 0    patient supplied medication, Take 1 each by mouth daily bariatric vitamin, patient unsure of medication name, Disp: , Rfl:     spironolactone (ALDACTONE) 25 mg tablet, Take 12 5 mg by mouth daily , Disp: , Rfl:     sucralfate (CARAFATE) 1 g/10 mL suspension, Take 10 mL (1 g total) by mouth 4 (four) times a day, Disp: 1200 mL, Rfl: 1    Thiamine HCl (VITAMIN B1 PO), Take 1 tablet by mouth daily, Disp: , Rfl:      Allergies   Allergen Reactions    Benadryl [Diphenhydramine] Anaphylaxis     Throat closing    Phenergan [Promethazine] Anaphylaxis        The following portions of the patient's history were updated by MA and reviewed by MD: allergies, current medications, past family history, past medical history, past social history, past surgical history and problem list       Physical Exam  Awake alert and oriented  Extraocular movements are intact  Pupils are equal round reactive to light and accommodate  There is a 2 diopter papilledema on the right and a 2+ diopter papilledema on the left  Her facial sensation is intact bilaterally  Facial expression is intact in grimace and at rest  Her speech is clear and comprehensible  She is cognitively intact  She has no drift  Her finger-to-nose testing is precise  She has no tremors  She has a positive Romberg  She can perform tandem gait with some wobbliness and occasional sidestepping  Her normal station and gait is without abnormality  Her heart is of regular rate and rhythm  Her lungs are clear to auscultation bilaterally

## 2019-12-10 NOTE — PROGRESS NOTES
[facility] Office Note  David Hand is a 38 y o  female      Visit Type: Follow-up      Diagnoses and all orders for this visit:    Pseudotumor cerebri  -     Case request operating room:  Image guided INSERTION SHUNT VENTRICULAR-ATRIAL - right coronal; Standing  -     Case request operating room: INSERTION SHUNT VENTRICULAR-ATRIAL - right coronal    Papilledema  -     Case request operating room: [address] [place] [address] - right coronal; Standing  -     Case request operating room: INSERTION SHUNT VENTRICULAR-ATRIAL - right coronal    Other orders  -     spironolactone (ALDACTONE) 25 mg tablet; Take 12 5 mg by mouth daily   -     Diet NPO; Sips with meds; Standing  -     Nursing Communication [address] [place] [address] Interventions Implemented; Standing  -     Nursing Communication Use 2 CHG cloths, have staff wash the entire body (neck down) ; Standing  -     Nursing Communication Swab both nares with Povidone-Iodine solution, EXCLUDE if patient has shellfish/Iodine allergy; Standing  -     chlorhexidine (PERIDEX) 0 12 % oral rinse 15 mL  -     Void on call to OR; Standing  -     Insert peripheral IV; Standing  -     ceFAZolin (ANCEF) IVPB (premix) 1,000 mg        DISCUSSION SUMMARY  This is a 79-year-old female with a very complicated past medical history who was admitted with a 5 month hospitalization for sepsis and abdominal wound infection  She has a history of pseudotumor cerebri which was initially treated with a lumbar peritoneal shunt placed by another surgeon  This failed  An image guided ventriculoperitoneal shunt was placed but had to be removed after her abdominal sepsis occurred  Unfortunately she complained of similar symptoms to her previous history of pseudotumor cerebri  She went to her ophthalmologist who found palpable edema  I confirmed this today and believe that she has symptomatic pseudotumor cerebri once again      I have recommended a ventricular atrial shunt given her history  I will attempt to contact her bariatric surgeon to discuss more thoroughly her situation  She does need to have updated imaging of the brain  We have arranged for this to occur and is scheduled to placement of her shunt to follow  Return schedule surgery  CHIEF COMPLAINT  Patient presents for follow up regarding noted papilledema    NEUROSURGERY PROCEDURES  11/19/15 (Dr Faisal Smith) 1  Diagnostic laparoscopy  2  Laparoscopic-assited placement of lumbar peritoneal shunt  11/19/15 (Dr aFisal Smith / Dr Alex Xie) Placement of a lumboperitoneal shunt system  We implanted a HV valve made by [employer]  This was a horizontal 50-80 mm of water and vertical was 230-320 mm of water  5/31/17 ([facility]) IMAGE GUIDED CORONAL PLACEMENT OF PROGRAMABLE VENTRICULAR-PERITONEAL SHUNT, REMOVAL OF LP SHUNT (Right Head)  1/25/18 ([facility] / Dr Clarisse Iqbal)  Externalization of right-sided SHUNT VENTRICULAR-PERITONEAL in anterior chest wall ribs two and three level   (Right Chest); Exploratory Laparotomy, wash out,placement of drains, placement of NG feeding tube  (N/A Abdomen)  2/5/18 ([facility]) Removal of  shunt (Right Head)    HISTORY OF PRESENT ILLNESS  Patient is known to our practice for a diagnosis of pseudotumor cerebri  Of course this was originally treated with a lumbar peritoneal shunt  This was pulling on lumbar nerve roots and worked intermittently and is for these reasons it had to be removed  A ventriculoperitoneal shunt was placed and was working well  Unfortunately following abdominal surgery complicated by gastric perforation and peritonitis, it was necessary to externalize and later remove the ventriculoperitoneal shunt  She was last seen in our office on 12/13/18 and she complained of a pressure-like headache but without noted visual changes   Our plan would be to observe closely the progression of her symptoms and to review her ophthalmological evaluation prior to proceeding  The potential remedies for her condition include endovascular techniques versus the placement of a ventricular atrial shunt  We had recommended an MRI brain for follow up but this was denied by insurance  A peer to peer was done with Dr Eliana Greenberg who reported that with no new reported symptoms or change in symptoms MRI is not approved, she advises keep planned follow-up December 2019 should there be any interval changes, return of symptoms, new symptoms, or new findings, such as changes on a redness evaluation by Ophthalmology, than MRI brain with and without contrast should be ordered  Received call from patient's FMD on 12/5/19; papilledema noted  There is intolerance to Diamox  Plan: Start diuretic  Patient was advised to be seen in office VICKIE Rock complains of shooting pain in her head and in the retro-orbital region  She notes some changes in her vision  She went to her ophthalmologist who found palpiledema on imaging  She complains of dizziness and increasing degrees of lightheadedness  She has some headaches and has had vomiting but she relates this to her abdominal issues  Review of Systems   Constitutional: Negative  HENT: Negative  Eyes: Negative  Respiratory: Negative  Cardiovascular: Negative  Gastrointestinal: Negative  Endocrine: Negative  Genitourinary: Negative  Musculoskeletal: Negative  Skin: Negative  Allergic/Immunologic: Negative  Neurological: Positive for headaches (pressure like feeling non-stop and worsening)  Hematological: Negative  Psychiatric/Behavioral: Negative  All other systems reviewed and are negative  I reviewed the ROS      MEDICAL HISTORY  Past Medical History:   Diagnosis Date    Abdominal pain     Brain condition     Pseudotumor Cerebri     Chronic kidney disease     De Quervain's disease (tenosynovitis)     Depression 1/10/2019    Esophageal perforation     Gastric leak     GERD (gastroesophageal reflux disease)     Idiopathic intracranial hypertension     Kidney stone     Migraine     Obesity     Papilledema, both eyes     Postgastrectomy malabsorption     Presence of lumboperitoneal shunt     Resolved: Sep 20, 2017    Rotator cuff tendinitis     Resolved: Aug 23, 2017    Visual field defect        Past Surgical History:   Procedure Laterality Date    CSF SHUNT      LP shunt - 2015 -  shunt - 2017    ESOPHAGOGASTRODUODENOSCOPY N/A 2/23/2018    Procedure: ESOPHAGOGASTRODUODENOSCOPY (EGD) WITH ESOPHAGEAL STENT PLACEMENT;  Surgeon: Марина Abarca MD;  [facility];  Service: Thoracic    ESOPHAGOGASTRODUODENOSCOPY N/A 2/23/2018    Procedure: ESOPHAGOGASTRODUODENOSCOPY (EGD) WITH REMOVAL ESOPHAGEAL STENT  AND REPLACEMENT WITH 23mm X155mm [address];  Surgeon: Марина Abarca MD;  [facility];  Service: Thoracic    ESOPHAGOGASTRODUODENOSCOPY N/A 3/28/2018    Procedure: ESOPHAGOGASTRODUODENOSCOPY (EGD) with PEJ placement ;  Surgeon: Melissa Pederson MD;  [facility]; Service: Gastroenterology    ESOPHAGOGASTRODUODENOSCOPY N/A 4/5/2018    Procedure: ESOPHAGOGASTRODUODENOSCOPY (EGD) with botox injection and kaofed placement;  Surgeon: Yandel Monreal DO;  [facility]; Service: Gastroenterology    ESOPHAGOGASTRODUODENOSCOPY N/A 4/10/2018    Procedure: ESOPHAGOGASTRODUODENOSCOPY (EGD) with Kaofed placement;  Surgeon: Eryn Salazar MD;  [facility]; Service: Gastroenterology    ESOPHAGOSCOPY WITH STENT INSERTION N/A 1/24/2018    Procedure: INSERTION STENT ESOPHAGEAL;  Surgeon: Lan Maya MD;  [facility];   Service: Gastroenterology    EYE SURGERY Right 1980    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)     GASTRIC BYPASS  12/19/2016    Lap sleeve gastrectomy w/shunt length shortening procedure    HERNIA REPAIR      HYSTERECTOMY  03/14/2013    IR LUMBAR PUNCTURE  8/29/2018    LAPAROTOMY N/A 1/25/2018    Procedure: Exploratory Laparotomy, wash out,placement of drains, placement of NG feeding tube ; Surgeon: Jossue Boudreaux DO;  [facility];  Service: General    LUMBAR PERITONEAL SHUNT  2015    Laparoscopic assisted    WA CREATE SHUNT:VENTRIC-PERITONEAL Right 2017    Procedure: IMAGE GUIDED CORONAL PLACEMENT OF PROGRAMABLE VENTRICULAR-PERITONEAL SHUNT, REMOVAL OF LP SHUNT ;  Surgeon: Norma Reddy MD;  [facility];  Service: Neurosurgery    WA EGD TRANSORAL BIOPSY SINGLE/MULTIPLE N/A 2018    Procedure: ESOPHAGOGASTRODUODENOSCOPY (EGD) with padlock clip placement;  Surgeon: Lisa Villar MD;  [facility];   Service: Abena Clint CSF SHUNT,W/O REPLACE Right 2018    Procedure: Removal of  shunt;  Surgeon: Norma Reddy MD;  [facility];  Service: Neurosurgery    WA REPLACEMENT/REVISION,CSF SHUNT Right 2018    Procedure: Externalization of right-sided SHUNT VENTRICULAR-PERITONEAL in anterior chest wall ribs two and three level  ;  Surgeon: Lavelle Barnes MD;  [facility];  Service: Neurosurgery    WRIST SURGERY Right     x3 ,        Social History     Tobacco Use    Smoking status: Former Smoker     Packs/day: 0 50     Years: 20 00     Pack years: 10 00     Last attempt to quit: 2012     Years since quittin 2    Smokeless tobacco: Never Used    Tobacco comment: Per [employer]: Never a smoker   Substance Use Topics    Alcohol use: No    Drug use: Yes     Frequency: 7 0 times per week     Types: Marijuana     Comment: medical marijuana       Vitals:    12/10/19 1015   BP: 122/84   BP Location: Right arm   Patient Position: Sitting   Cuff Size: Standard   Pulse: 58   Resp: 16   Temp: 97 7 °F (36 5 °C)   TempSrc: Tympanic   Weight: 61 2 kg (135 lb)   Height: 5' 3" (1 6 m)         Current Outpatient Medications:     BIOTIN PO, Take by mouth daily , Disp: , Rfl:     Calcium Carb-Cholecalciferol (CALCIUM 1000 + D PO), Take 1 capsule by mouth daily, Disp: , Rfl:     Cyanocobalamin (VITAMIN B 12 PO), Take by mouth, Disp: , Rfl:     Multiple Vitamin (MULTIVITAMIN) tablet, Take 1 tablet by mouth daily, Disp: , Rfl:     omeprazole (PriLOSEC) 40 MG capsule, Take 1 capsule (40 mg total) by mouth 2 (two) times a day, Disp: 60 capsule, Rfl: 0    patient supplied medication, Take 1 each by mouth daily bariatric vitamin, patient unsure of medication name, Disp: , Rfl:     spironolactone (ALDACTONE) 25 mg tablet, Take 12 5 mg by mouth daily , Disp: , Rfl:     sucralfate (CARAFATE) 1 g/10 mL suspension, Take 10 mL (1 g total) by mouth 4 (four) times a day, Disp: 1200 mL, Rfl: 1    Thiamine HCl (VITAMIN B1 PO), Take 1 tablet by mouth daily, Disp: , Rfl:      Allergies   Allergen Reactions    Benadryl [Diphenhydramine] Anaphylaxis     Throat closing    Phenergan [Promethazine] Anaphylaxis        The following portions of the patient's history were updated by MA and reviewed by MD: allergies, current medications, past family history, past medical history, past social history, past surgical history and problem list       Physical Exam  Awake alert and oriented  Extraocular movements are intact  Pupils are equal round reactive to light and accommodate  There is a 2 diopter papilledema on the right and a 2+ diopter papilledema on the left  Her facial sensation is intact bilaterally  Facial expression is intact in grimace and at rest  Her speech is clear and comprehensible  She is cognitively intact  She has no drift  Her finger-to-nose testing is precise  She has no tremors  She has a positive Romberg  She can perform tandem gait with some wobbliness and occasional sidestepping  Her normal station and gait is without abnormality  Her heart is of regular rate and rhythm  Her lungs are clear to auscultation bilaterally

## 2019-12-13 ENCOUNTER — ANESTHESIA EVENT (OUTPATIENT)
Dept: PERIOP | Facility: HOSPITAL | Age: 44
DRG: 022 | End: 2019-12-13
Payer: COMMERCIAL

## 2019-12-13 NOTE — PRE-PROCEDURE INSTRUCTIONS
Pre-Surgery Instructions:   Medication Instructions    BIOTIN PO Instructed patient per Anesthesia Guidelines   Calcium Carb-Cholecalciferol (CALCIUM 1000 + D PO) Instructed patient per Anesthesia Guidelines   Cyanocobalamin (VITAMIN B 12 PO) Instructed patient per Anesthesia Guidelines   Multiple Vitamin (MULTIVITAMIN) tablet Instructed patient per Anesthesia Guidelines   omeprazole (PriLOSEC) 40 MG capsule Instructed patient per Anesthesia Guidelines   patient supplied medication Instructed patient per Anesthesia Guidelines   spironolactone (ALDACTONE) 25 mg tablet Instructed patient per Anesthesia Guidelines   sucralfate (CARAFATE) 1 g/10 mL suspension Instructed patient per Anesthesia Guidelines   Thiamine HCl (VITAMIN B1 PO) Instructed patient per Anesthesia Guidelines  Spoke to pt  Medication list reviewed & instructed   As of 12/13 pt instructed to stop calcium, biotin, b12, MV, b1, and bariatric vitamin  Instructed on tylenol only   Am DOS pt ok to take omeprazole with 1-2 sips of water   Showering instructions provided by surgeon office   Pt uses medical marijuana, vaping and topical  Advised no vaping am DOS  Pt aware  Pt reports titanium padlock in abdomen from 'hole in stomach'  Refuses blood products, will confirm surgeon office is aware  All instructions verbally understood by patient  All questions answered  Vitamin Med Class     You may continue to take any vitamin that your surgeon has prescribed to you up to the day before surgery  If your surgeon has not specifically prescribed this vitamin or instructed you to continue then stop taking 7 days prior to surgery

## 2019-12-16 ENCOUNTER — APPOINTMENT (OUTPATIENT)
Dept: LAB | Age: 44
End: 2019-12-16
Payer: COMMERCIAL

## 2019-12-16 ENCOUNTER — HOSPITAL ENCOUNTER (OUTPATIENT)
Dept: MRI IMAGING | Facility: HOSPITAL | Age: 44
Discharge: HOME | End: 2019-12-16
Payer: COMMERCIAL

## 2019-12-16 DIAGNOSIS — G93.2 PSEUDOTUMOR CEREBRI: ICD-10-CM

## 2019-12-16 DIAGNOSIS — H47.10 PAPILLEDEMA: ICD-10-CM

## 2019-12-16 LAB
ALBUMIN SERPL BCP-MCNC: 3.6 G/DL (ref 3.5–5)
ALP SERPL-CCNC: 58 U/L (ref 46–116)
ALT SERPL W P-5'-P-CCNC: 13 U/L (ref 12–78)
ANION GAP SERPL CALCULATED.3IONS-SCNC: 2 MMOL/L (ref 4–13)
APTT PPP: 25 SECONDS (ref 23–37)
AST SERPL W P-5'-P-CCNC: 9 U/L (ref 5–45)
BASOPHILS # BLD AUTO: 0.07 THOUSANDS/ΜL (ref 0–0.1)
BASOPHILS NFR BLD AUTO: 2 % (ref 0–1)
BILIRUB SERPL-MCNC: 0.34 MG/DL (ref 0.2–1)
BILIRUB UR QL STRIP: NEGATIVE
BUN SERPL-MCNC: 15 MG/DL (ref 5–25)
CALCIUM SERPL-MCNC: 9.1 MG/DL (ref 8.3–10.1)
CHLORIDE SERPL-SCNC: 108 MMOL/L (ref 100–108)
CLARITY UR: NORMAL
CO2 SERPL-SCNC: 30 MMOL/L (ref 21–32)
COLOR UR: YELLOW
CREAT SERPL-MCNC: 0.63 MG/DL (ref 0.6–1.3)
EOSINOPHIL # BLD AUTO: 0.11 THOUSAND/ΜL (ref 0–0.61)
EOSINOPHIL NFR BLD AUTO: 2 % (ref 0–6)
ERYTHROCYTE [DISTWIDTH] IN BLOOD BY AUTOMATED COUNT: 14.9 % (ref 11.6–15.1)
EST. AVERAGE GLUCOSE BLD GHB EST-MCNC: 100 MG/DL
GFR SERPL CREATININE-BSD FRML MDRD: 109 ML/MIN/1.73SQ M
GLUCOSE SERPL-MCNC: 79 MG/DL (ref 65–140)
GLUCOSE UR STRIP-MCNC: NEGATIVE MG/DL
HBA1C MFR BLD: 5.1 % (ref 4.2–6.3)
HCT VFR BLD AUTO: 37.3 % (ref 34.8–46.1)
HGB BLD-MCNC: 10.9 G/DL (ref 11.5–15.4)
HGB UR QL STRIP.AUTO: NEGATIVE
IMM GRANULOCYTES # BLD AUTO: 0.01 THOUSAND/UL (ref 0–0.2)
IMM GRANULOCYTES NFR BLD AUTO: 0 % (ref 0–2)
INR PPP: 1.04 (ref 0.84–1.19)
KETONES UR STRIP-MCNC: NEGATIVE MG/DL
LEUKOCYTE ESTERASE UR QL STRIP: NEGATIVE
LYMPHOCYTES # BLD AUTO: 1.2 THOUSANDS/ΜL (ref 0.6–4.47)
LYMPHOCYTES NFR BLD AUTO: 26 % (ref 14–44)
MCH RBC QN AUTO: 24.6 PG (ref 26.8–34.3)
MCHC RBC AUTO-ENTMCNC: 29.2 G/DL (ref 31.4–37.4)
MCV RBC AUTO: 84 FL (ref 82–98)
MONOCYTES # BLD AUTO: 0.31 THOUSAND/ΜL (ref 0.17–1.22)
MONOCYTES NFR BLD AUTO: 7 % (ref 4–12)
NEUTROPHILS # BLD AUTO: 2.99 THOUSANDS/ΜL (ref 1.85–7.62)
NEUTS SEG NFR BLD AUTO: 63 % (ref 43–75)
NITRITE UR QL STRIP: NEGATIVE
NRBC BLD AUTO-RTO: 0 /100 WBCS
PH UR STRIP.AUTO: 7.5 [PH]
PLATELET # BLD AUTO: 133 THOUSANDS/UL (ref 149–390)
PMV BLD AUTO: 12.1 FL (ref 8.9–12.7)
POTASSIUM SERPL-SCNC: 4.2 MMOL/L (ref 3.5–5.3)
PROT SERPL-MCNC: 6.7 G/DL (ref 6.4–8.2)
PROT UR STRIP-MCNC: NEGATIVE MG/DL
PROTHROMBIN TIME: 13.2 SECONDS (ref 11.6–14.5)
RBC # BLD AUTO: 4.43 MILLION/UL (ref 3.81–5.12)
SODIUM SERPL-SCNC: 140 MMOL/L (ref 136–145)
SP GR UR STRIP.AUTO: 1.02 (ref 1–1.03)
UROBILINOGEN UR QL STRIP.AUTO: 1 E.U./DL
WBC # BLD AUTO: 4.69 THOUSAND/UL (ref 4.31–10.16)

## 2019-12-16 PROCEDURE — 70551 MRI BRAIN STEM W/O DYE: CPT

## 2019-12-16 PROCEDURE — 85610 PROTHROMBIN TIME: CPT

## 2019-12-16 PROCEDURE — 36415 COLL VENOUS BLD VENIPUNCTURE: CPT

## 2019-12-16 PROCEDURE — 80053 COMPREHEN METABOLIC PANEL: CPT

## 2019-12-16 PROCEDURE — 85730 THROMBOPLASTIN TIME PARTIAL: CPT

## 2019-12-16 PROCEDURE — 85025 COMPLETE CBC W/AUTO DIFF WBC: CPT

## 2019-12-16 PROCEDURE — 83036 HEMOGLOBIN GLYCOSYLATED A1C: CPT

## 2019-12-16 PROCEDURE — 81003 URINALYSIS AUTO W/O SCOPE: CPT | Performed by: NEUROLOGICAL SURGERY

## 2019-12-17 ENCOUNTER — DOCUMENTATION (OUTPATIENT)
Dept: NEUROSURGERY | Facility: CLINIC | Age: 44
End: 2019-12-17

## 2019-12-17 NOTE — ANESTHESIA PREPROCEDURE EVALUATION
Review of Systems/Medical History  Patient summary reviewed  Chart reviewed  No history of anesthetic complications     Cardiovascular    Comment: ECHO 1/ 2018--EF 52%, normal systolic fxn, no reg abnml,  Pulmonary  Smoker ex-smoker  ,        GI/Hepatic    GERD poorly controlled, Bariatric surgery (Postgastrectomy malabsorption),   Comment: proximal gastric perforation following hiatal hernia repair, s/p multiple surgical interventions including esophageal stent with removal         Negative  ROS        Endo/Other    Comment: Right eye choroidal nevus    GYN  Negative gynecology ROS          Hematology  Negative hematology ROS      Musculoskeletal  Negative musculoskeletal ROS   Arthritis     Neurology    Headaches,   Comment:    Pseudotumor cerebri (G93 2)      Papilledema (H47 10) Psychology   Negative psychology ROS              Physical Exam    Airway    Mallampati score: II  TM Distance: >3 FB  Neck ROM: full     Dental       Cardiovascular      Pulmonary      Other Findings        Anesthesia Plan  ASA Score- 3     Anesthesia Type- general with ASA Monitors  Additional Monitors:   Airway Plan: ETT  Plan Factors-    Induction- intravenous  Postoperative Plan-     Informed Consent-   I personally reviewed this patient with the CRNA  Discussed and agreed on the Anesthesia Plan with the CRNA  Vickey Armstrong

## 2019-12-18 ENCOUNTER — APPOINTMENT (OUTPATIENT)
Dept: RADIOLOGY | Facility: HOSPITAL | Age: 44
DRG: 022 | End: 2019-12-18
Attending: NEUROLOGICAL SURGERY
Payer: COMMERCIAL

## 2019-12-18 ENCOUNTER — ANESTHESIA (OUTPATIENT)
Dept: PERIOP | Facility: HOSPITAL | Age: 44
DRG: 022 | End: 2019-12-18
Payer: COMMERCIAL

## 2019-12-18 ENCOUNTER — HOSPITAL ENCOUNTER (INPATIENT)
Facility: HOSPITAL | Age: 44
LOS: 4 days | Discharge: HOME | DRG: 022 | End: 2019-12-22
Attending: NEUROLOGICAL SURGERY | Admitting: NEUROLOGICAL SURGERY
Payer: COMMERCIAL

## 2019-12-18 ENCOUNTER — APPOINTMENT (INPATIENT)
Dept: RADIOLOGY | Facility: HOSPITAL | Age: 44
DRG: 022 | End: 2019-12-18
Payer: COMMERCIAL

## 2019-12-18 ENCOUNTER — APPOINTMENT (OUTPATIENT)
Dept: RADIOLOGY | Facility: HOSPITAL | Age: 44
DRG: 022 | End: 2019-12-18
Payer: COMMERCIAL

## 2019-12-18 DIAGNOSIS — H47.10 PAPILLEDEMA: ICD-10-CM

## 2019-12-18 DIAGNOSIS — Z98.2 S/P VP SHUNT: Primary | ICD-10-CM

## 2019-12-18 DIAGNOSIS — G93.2 PSEUDOTUMOR CEREBRI: ICD-10-CM

## 2019-12-18 LAB
APPEARANCE CSF: ABNORMAL
EOSINOPHIL NFR CSF MANUAL: 1 %
GLUCOSE CSF-MCNC: 71 MG/DL (ref 50–80)
LYMPHOCYTES NFR CSF MANUAL: 7 %
NEUTROPHILS NFR CSF MANUAL: 92 %
PLATELET # BLD AUTO: 127 THOUSANDS/UL (ref 149–390)
PMV BLD AUTO: 10.7 FL (ref 8.9–12.7)
PROT CSF-MCNC: 43 MG/DL (ref 15–45)
RBC # CSF MANUAL: ABNORMAL UL (ref 0–10)
TOTAL CELLS COUNTED BLD: NO
TOTAL CELLS COUNTED SPEC: 100
WBC # CSF AUTO: 42 /UL (ref 0–5)

## 2019-12-18 PROCEDURE — 87070 CULTURE OTHR SPECIMN AEROBIC: CPT | Performed by: NEUROLOGICAL SURGERY

## 2019-12-18 PROCEDURE — 89051 BODY FLUID CELL COUNT: CPT | Performed by: NEUROLOGICAL SURGERY

## 2019-12-18 PROCEDURE — 82945 GLUCOSE OTHER FLUID: CPT | Performed by: NEUROLOGICAL SURGERY

## 2019-12-18 PROCEDURE — 89050 BODY FLUID CELL COUNT: CPT | Performed by: NEUROLOGICAL SURGERY

## 2019-12-18 PROCEDURE — 71045 X-RAY EXAM CHEST 1 VIEW: CPT

## 2019-12-18 PROCEDURE — 85049 AUTOMATED PLATELET COUNT: CPT | Performed by: NEUROLOGICAL SURGERY

## 2019-12-18 PROCEDURE — 84157 ASSAY OF PROTEIN OTHER: CPT | Performed by: NEUROLOGICAL SURGERY

## 2019-12-18 PROCEDURE — NC001 PR NO CHARGE: Performed by: INTERNAL MEDICINE

## 2019-12-18 PROCEDURE — C1894 INTRO/SHEATH, NON-LASER: HCPCS | Performed by: NEUROLOGICAL SURGERY

## 2019-12-18 PROCEDURE — 61781 SCAN PROC CRANIAL INTRA: CPT | Performed by: NEUROLOGICAL SURGERY

## 2019-12-18 PROCEDURE — 99233 SBSQ HOSP IP/OBS HIGH 50: CPT | Performed by: STUDENT IN AN ORGANIZED HEALTH CARE EDUCATION/TRAINING PROGRAM

## 2019-12-18 PROCEDURE — 62220 ESTABLISH BRAIN CAVITY SHUNT: CPT | Performed by: NEUROLOGICAL SURGERY

## 2019-12-18 PROCEDURE — C9113 INJ PANTOPRAZOLE SODIUM, VIA: HCPCS

## 2019-12-18 PROCEDURE — 00163J2 BYPASS CEREBRAL VENTRICLE TO ATRIUM WITH SYNTHETIC SUBSTITUTE, PERCUTANEOUS APPROACH: ICD-10-PCS | Performed by: NEUROLOGICAL SURGERY

## 2019-12-18 PROCEDURE — 70450 CT HEAD/BRAIN W/O DYE: CPT

## 2019-12-18 DEVICE — BACTISEAL VP CATHETER KIT: Type: IMPLANTABLE DEVICE | Site: BRAIN | Status: FUNCTIONAL

## 2019-12-18 DEVICE — VALVE CERTAS PLUS PROGRAMMABLE IN-LINE VALVE WITH SIPHONGUARD DEVICE: Type: IMPLANTABLE DEVICE | Site: BRAIN | Status: FUNCTIONAL

## 2019-12-18 DEVICE — CATH 43418 CARDIAC CATH REDUCED TIP 58CM: Type: IMPLANTABLE DEVICE | Site: NECK | Status: FUNCTIONAL

## 2019-12-18 DEVICE — INTEGRA® NEUROSCIENCES CONNECTOR PUDENZ STRAIGHT CONNECTOR
Type: IMPLANTABLE DEVICE | Site: NECK | Status: FUNCTIONAL
Brand: INTEGRA®

## 2019-12-18 RX ORDER — PANTOPRAZOLE SODIUM 40 MG/1
40 TABLET, DELAYED RELEASE ORAL
Status: DISCONTINUED | OUTPATIENT
Start: 2019-12-19 | End: 2019-12-21

## 2019-12-18 RX ORDER — METOCLOPRAMIDE HYDROCHLORIDE 5 MG/ML
10 INJECTION INTRAMUSCULAR; INTRAVENOUS ONCE AS NEEDED
Status: DISCONTINUED | OUTPATIENT
Start: 2019-12-18 | End: 2019-12-18 | Stop reason: HOSPADM

## 2019-12-18 RX ORDER — ONDANSETRON 2 MG/ML
4 INJECTION INTRAMUSCULAR; INTRAVENOUS ONCE AS NEEDED
Status: DISCONTINUED | OUTPATIENT
Start: 2019-12-18 | End: 2019-12-18 | Stop reason: HOSPADM

## 2019-12-18 RX ORDER — HYDROMORPHONE HCL/PF 1 MG/ML
0.5 SYRINGE (ML) INJECTION
Status: DISCONTINUED | OUTPATIENT
Start: 2019-12-18 | End: 2019-12-18

## 2019-12-18 RX ORDER — SUCRALFATE ORAL 1 G/10ML
1000 SUSPENSION ORAL 4 TIMES DAILY
Status: DISCONTINUED | OUTPATIENT
Start: 2019-12-18 | End: 2019-12-22 | Stop reason: HOSPADM

## 2019-12-18 RX ORDER — PANTOPRAZOLE SODIUM 40 MG/1
INJECTION, POWDER, FOR SOLUTION INTRAVENOUS
Status: COMPLETED
Start: 2019-12-18 | End: 2019-12-18

## 2019-12-18 RX ORDER — SODIUM CHLORIDE, SODIUM LACTATE, POTASSIUM CHLORIDE, CALCIUM CHLORIDE 600; 310; 30; 20 MG/100ML; MG/100ML; MG/100ML; MG/100ML
125 INJECTION, SOLUTION INTRAVENOUS CONTINUOUS
Status: DISCONTINUED | OUTPATIENT
Start: 2019-12-18 | End: 2019-12-18

## 2019-12-18 RX ORDER — HYDROMORPHONE HCL/PF 1 MG/ML
0.5 SYRINGE (ML) INJECTION
Status: COMPLETED | OUTPATIENT
Start: 2019-12-18 | End: 2019-12-18

## 2019-12-18 RX ORDER — PROPOFOL 10 MG/ML
INJECTION, EMULSION INTRAVENOUS AS NEEDED
Status: DISCONTINUED | OUTPATIENT
Start: 2019-12-18 | End: 2019-12-18 | Stop reason: SURG

## 2019-12-18 RX ORDER — LIDOCAINE HYDROCHLORIDE 10 MG/ML
INJECTION, SOLUTION EPIDURAL; INFILTRATION; INTRACAUDAL; PERINEURAL AS NEEDED
Status: DISCONTINUED | OUTPATIENT
Start: 2019-12-18 | End: 2019-12-18 | Stop reason: SURG

## 2019-12-18 RX ORDER — NEOSTIGMINE METHYLSULFATE 1 MG/ML
INJECTION INTRAVENOUS AS NEEDED
Status: DISCONTINUED | OUTPATIENT
Start: 2019-12-18 | End: 2019-12-18 | Stop reason: SURG

## 2019-12-18 RX ORDER — EPHEDRINE SULFATE 50 MG/ML
INJECTION INTRAVENOUS AS NEEDED
Status: DISCONTINUED | OUTPATIENT
Start: 2019-12-18 | End: 2019-12-18 | Stop reason: SURG

## 2019-12-18 RX ORDER — GLYCOPYRROLATE 0.2 MG/ML
INJECTION INTRAMUSCULAR; INTRAVENOUS AS NEEDED
Status: DISCONTINUED | OUTPATIENT
Start: 2019-12-18 | End: 2019-12-18 | Stop reason: SURG

## 2019-12-18 RX ORDER — CHLORHEXIDINE GLUCONATE 0.12 MG/ML
15 RINSE ORAL ONCE
Status: COMPLETED | OUTPATIENT
Start: 2019-12-18 | End: 2019-12-18

## 2019-12-18 RX ORDER — LIDOCAINE HYDROCHLORIDE 10 MG/ML
INJECTION, SOLUTION EPIDURAL; INFILTRATION; INTRACAUDAL; PERINEURAL AS NEEDED
Status: DISCONTINUED | OUTPATIENT
Start: 2019-12-18 | End: 2019-12-18 | Stop reason: HOSPADM

## 2019-12-18 RX ORDER — METOCLOPRAMIDE HYDROCHLORIDE 5 MG/ML
10 INJECTION INTRAMUSCULAR; INTRAVENOUS EVERY 6 HOURS PRN
Status: DISCONTINUED | OUTPATIENT
Start: 2019-12-18 | End: 2019-12-19

## 2019-12-18 RX ORDER — HEPARIN SODIUM 5000 [USP'U]/ML
5000 INJECTION, SOLUTION INTRAVENOUS; SUBCUTANEOUS EVERY 8 HOURS SCHEDULED
Status: DISCONTINUED | OUTPATIENT
Start: 2019-12-19 | End: 2019-12-22 | Stop reason: HOSPADM

## 2019-12-18 RX ORDER — ONDANSETRON 2 MG/ML
4 INJECTION INTRAMUSCULAR; INTRAVENOUS ONCE
Status: COMPLETED | OUTPATIENT
Start: 2019-12-18 | End: 2019-12-18

## 2019-12-18 RX ORDER — PANTOPRAZOLE SODIUM 40 MG/1
40 INJECTION, POWDER, FOR SOLUTION INTRAVENOUS ONCE
Status: COMPLETED | OUTPATIENT
Start: 2019-12-18 | End: 2019-12-18

## 2019-12-18 RX ORDER — SODIUM CHLORIDE 450 MG/100ML
100 INJECTION, SOLUTION INTRAVENOUS CONTINUOUS
Status: DISCONTINUED | OUTPATIENT
Start: 2019-12-18 | End: 2019-12-20

## 2019-12-18 RX ORDER — DEXAMETHASONE SODIUM PHOSPHATE 10 MG/ML
INJECTION, SOLUTION INTRAMUSCULAR; INTRAVENOUS AS NEEDED
Status: DISCONTINUED | OUTPATIENT
Start: 2019-12-18 | End: 2019-12-18 | Stop reason: SURG

## 2019-12-18 RX ORDER — FENTANYL CITRATE 50 UG/ML
INJECTION, SOLUTION INTRAMUSCULAR; INTRAVENOUS AS NEEDED
Status: DISCONTINUED | OUTPATIENT
Start: 2019-12-18 | End: 2019-12-18 | Stop reason: SURG

## 2019-12-18 RX ORDER — CALCIUM CARBONATE 500(1250)
2 TABLET ORAL
Status: DISCONTINUED | OUTPATIENT
Start: 2019-12-19 | End: 2019-12-22 | Stop reason: HOSPADM

## 2019-12-18 RX ORDER — DOCUSATE SODIUM 100 MG/1
100 CAPSULE, LIQUID FILLED ORAL 2 TIMES DAILY
Status: DISCONTINUED | OUTPATIENT
Start: 2019-12-18 | End: 2019-12-22 | Stop reason: HOSPADM

## 2019-12-18 RX ORDER — LIDOCAINE HYDROCHLORIDE AND EPINEPHRINE 10; 10 MG/ML; UG/ML
INJECTION, SOLUTION INFILTRATION; PERINEURAL AS NEEDED
Status: DISCONTINUED | OUTPATIENT
Start: 2019-12-18 | End: 2019-12-18 | Stop reason: HOSPADM

## 2019-12-18 RX ORDER — OXYCODONE HYDROCHLORIDE 5 MG/1
5 TABLET ORAL EVERY 4 HOURS PRN
Status: DISCONTINUED | OUTPATIENT
Start: 2019-12-18 | End: 2019-12-20

## 2019-12-18 RX ORDER — ROCURONIUM BROMIDE 10 MG/ML
INJECTION, SOLUTION INTRAVENOUS AS NEEDED
Status: DISCONTINUED | OUTPATIENT
Start: 2019-12-18 | End: 2019-12-18 | Stop reason: SURG

## 2019-12-18 RX ORDER — HYDROMORPHONE HCL/PF 1 MG/ML
0.5 SYRINGE (ML) INJECTION
Status: DISCONTINUED | OUTPATIENT
Start: 2019-12-18 | End: 2019-12-18 | Stop reason: HOSPADM

## 2019-12-18 RX ORDER — ACETAMINOPHEN 325 MG/1
650 TABLET ORAL EVERY 4 HOURS PRN
Status: DISCONTINUED | OUTPATIENT
Start: 2019-12-18 | End: 2019-12-22 | Stop reason: HOSPADM

## 2019-12-18 RX ORDER — SPIRONOLACTONE 25 MG/1
12.5 TABLET ORAL DAILY
Status: DISCONTINUED | OUTPATIENT
Start: 2019-12-18 | End: 2019-12-22 | Stop reason: HOSPADM

## 2019-12-18 RX ORDER — ONDANSETRON 2 MG/ML
4 INJECTION INTRAMUSCULAR; INTRAVENOUS EVERY 6 HOURS PRN
Status: DISCONTINUED | OUTPATIENT
Start: 2019-12-18 | End: 2019-12-19

## 2019-12-18 RX ORDER — OXYCODONE HYDROCHLORIDE 5 MG/1
10 TABLET ORAL EVERY 4 HOURS PRN
Status: DISCONTINUED | OUTPATIENT
Start: 2019-12-18 | End: 2019-12-19

## 2019-12-18 RX ORDER — HYDROMORPHONE HCL/PF 1 MG/ML
0.5 SYRINGE (ML) INJECTION
Status: DISCONTINUED | OUTPATIENT
Start: 2019-12-18 | End: 2019-12-19

## 2019-12-18 RX ORDER — VANCOMYCIN HYDROCHLORIDE 1 G/200ML
1000 INJECTION, SOLUTION INTRAVENOUS EVERY 12 HOURS
Status: COMPLETED | OUTPATIENT
Start: 2019-12-18 | End: 2019-12-18

## 2019-12-18 RX ORDER — MIDAZOLAM HYDROCHLORIDE 2 MG/2ML
INJECTION, SOLUTION INTRAMUSCULAR; INTRAVENOUS AS NEEDED
Status: DISCONTINUED | OUTPATIENT
Start: 2019-12-18 | End: 2019-12-18 | Stop reason: SURG

## 2019-12-18 RX ORDER — ONDANSETRON 2 MG/ML
INJECTION INTRAMUSCULAR; INTRAVENOUS AS NEEDED
Status: DISCONTINUED | OUTPATIENT
Start: 2019-12-18 | End: 2019-12-18 | Stop reason: SURG

## 2019-12-18 RX ORDER — SODIUM CHLORIDE 9 MG/ML
INJECTION, SOLUTION INTRAVENOUS CONTINUOUS PRN
Status: DISCONTINUED | OUTPATIENT
Start: 2019-12-18 | End: 2019-12-18 | Stop reason: SURG

## 2019-12-18 RX ORDER — THIAMINE MONONITRATE (VIT B1) 100 MG
100 TABLET ORAL DAILY
Status: DISCONTINUED | OUTPATIENT
Start: 2019-12-18 | End: 2019-12-22 | Stop reason: HOSPADM

## 2019-12-18 RX ORDER — BISACODYL 10 MG
10 SUPPOSITORY, RECTAL RECTAL DAILY PRN
Status: DISCONTINUED | OUTPATIENT
Start: 2019-12-18 | End: 2019-12-22 | Stop reason: HOSPADM

## 2019-12-18 RX ORDER — CEFAZOLIN SODIUM 1 G/50ML
1000 SOLUTION INTRAVENOUS ONCE
Status: COMPLETED | OUTPATIENT
Start: 2019-12-18 | End: 2019-12-18

## 2019-12-18 RX ADMIN — PROPOFOL 50 MG: 10 INJECTION, EMULSION INTRAVENOUS at 09:16

## 2019-12-18 RX ADMIN — METOCLOPRAMIDE 10 MG: 5 INJECTION, SOLUTION INTRAMUSCULAR; INTRAVENOUS at 21:22

## 2019-12-18 RX ADMIN — ROCURONIUM BROMIDE 10 MG: 50 INJECTION, SOLUTION INTRAVENOUS at 09:32

## 2019-12-18 RX ADMIN — HYDROMORPHONE HYDROCHLORIDE 0.5 MG: 1 INJECTION, SOLUTION INTRAMUSCULAR; INTRAVENOUS; SUBCUTANEOUS at 12:23

## 2019-12-18 RX ADMIN — PROPOFOL 150 MG: 10 INJECTION, EMULSION INTRAVENOUS at 08:40

## 2019-12-18 RX ADMIN — SPIRONOLACTONE 12.5 MG: 25 TABLET ORAL at 16:54

## 2019-12-18 RX ADMIN — HYDROMORPHONE HYDROCHLORIDE 0.5 MG: 1 INJECTION, SOLUTION INTRAMUSCULAR; INTRAVENOUS; SUBCUTANEOUS at 13:38

## 2019-12-18 RX ADMIN — FENTANYL CITRATE 50 MCG: 50 INJECTION, SOLUTION INTRAMUSCULAR; INTRAVENOUS at 09:44

## 2019-12-18 RX ADMIN — MIDAZOLAM 2 MG: 1 INJECTION INTRAMUSCULAR; INTRAVENOUS at 08:26

## 2019-12-18 RX ADMIN — ONDANSETRON 4 MG: 2 INJECTION INTRAMUSCULAR; INTRAVENOUS at 08:40

## 2019-12-18 RX ADMIN — DOCUSATE SODIUM 100 MG: 100 CAPSULE, LIQUID FILLED ORAL at 17:58

## 2019-12-18 RX ADMIN — NEOSTIGMINE METHYLSULFATE 3 MG: 1 INJECTION INTRAVENOUS at 11:22

## 2019-12-18 RX ADMIN — PANTOPRAZOLE SODIUM 40 MG: 40 INJECTION, POWDER, FOR SOLUTION INTRAVENOUS at 16:40

## 2019-12-18 RX ADMIN — THIAMINE HCL TAB 100 MG 100 MG: 100 TAB at 16:00

## 2019-12-18 RX ADMIN — HYDROMORPHONE HYDROCHLORIDE 0.5 MG: 1 INJECTION, SOLUTION INTRAMUSCULAR; INTRAVENOUS; SUBCUTANEOUS at 12:35

## 2019-12-18 RX ADMIN — FENTANYL CITRATE 50 MCG: 50 INJECTION, SOLUTION INTRAMUSCULAR; INTRAVENOUS at 08:39

## 2019-12-18 RX ADMIN — LIDOCAINE HYDROCHLORIDE 50 MG: 10 INJECTION, SOLUTION EPIDURAL; INFILTRATION; INTRACAUDAL; PERINEURAL at 08:40

## 2019-12-18 RX ADMIN — GLYCOPYRROLATE 0.4 MG: 0.2 INJECTION, SOLUTION INTRAMUSCULAR; INTRAVENOUS at 11:22

## 2019-12-18 RX ADMIN — SODIUM CHLORIDE, SODIUM LACTATE, POTASSIUM CHLORIDE, AND CALCIUM CHLORIDE: .6; .31; .03; .02 INJECTION, SOLUTION INTRAVENOUS at 07:45

## 2019-12-18 RX ADMIN — SODIUM CHLORIDE: 9 INJECTION, SOLUTION INTRAVENOUS at 08:44

## 2019-12-18 RX ADMIN — SUCRALFATE 1000 MG: 1 SUSPENSION ORAL at 17:58

## 2019-12-18 RX ADMIN — HYDROMORPHONE HYDROCHLORIDE 0.5 MG: 1 INJECTION, SOLUTION INTRAMUSCULAR; INTRAVENOUS; SUBCUTANEOUS at 13:27

## 2019-12-18 RX ADMIN — DEXAMETHASONE SODIUM PHOSPHATE 10 MG: 10 INJECTION, SOLUTION INTRAMUSCULAR; INTRAVENOUS at 08:40

## 2019-12-18 RX ADMIN — HYDROMORPHONE HYDROCHLORIDE 0.5 MG: 1 INJECTION, SOLUTION INTRAMUSCULAR; INTRAVENOUS; SUBCUTANEOUS at 12:14

## 2019-12-18 RX ADMIN — HYDROMORPHONE HYDROCHLORIDE 0.5 MG: 1 INJECTION, SOLUTION INTRAMUSCULAR; INTRAVENOUS; SUBCUTANEOUS at 14:10

## 2019-12-18 RX ADMIN — ROCURONIUM BROMIDE 30 MG: 50 INJECTION, SOLUTION INTRAVENOUS at 08:40

## 2019-12-18 RX ADMIN — CEFAZOLIN SODIUM 1000 MG: 1 SOLUTION INTRAVENOUS at 09:45

## 2019-12-18 RX ADMIN — CEFAZOLIN SODIUM 1000 MG: 1 SOLUTION INTRAVENOUS at 08:45

## 2019-12-18 RX ADMIN — HYDROMORPHONE HYDROCHLORIDE 0.5 MG: 1 INJECTION, SOLUTION INTRAMUSCULAR; INTRAVENOUS; SUBCUTANEOUS at 21:42

## 2019-12-18 RX ADMIN — HYDROMORPHONE HYDROCHLORIDE 0.5 MG: 1 INJECTION, SOLUTION INTRAMUSCULAR; INTRAVENOUS; SUBCUTANEOUS at 16:47

## 2019-12-18 RX ADMIN — PHENYLEPHRINE HYDROCHLORIDE 20 MCG/MIN: 10 INJECTION INTRAVENOUS at 09:08

## 2019-12-18 RX ADMIN — EPHEDRINE SULFATE 10 MG: 50 INJECTION, SOLUTION INTRAVENOUS at 08:47

## 2019-12-18 RX ADMIN — CHLORHEXIDINE GLUCONATE 0.12% ORAL RINSE 15 ML: 1.2 LIQUID ORAL at 06:31

## 2019-12-18 RX ADMIN — VANCOMYCIN HYDROCHLORIDE 1000 MG: 1 INJECTION, SOLUTION INTRAVENOUS at 16:00

## 2019-12-18 RX ADMIN — OXYCODONE HYDROCHLORIDE 5 MG: 5 TABLET ORAL at 17:58

## 2019-12-18 RX ADMIN — ONDANSETRON 4 MG: 2 INJECTION INTRAMUSCULAR; INTRAVENOUS at 20:31

## 2019-12-18 RX ADMIN — ONDANSETRON 4 MG: 2 INJECTION INTRAMUSCULAR; INTRAVENOUS at 16:24

## 2019-12-18 RX ADMIN — EPHEDRINE SULFATE 5 MG: 50 INJECTION, SOLUTION INTRAVENOUS at 10:34

## 2019-12-18 RX ADMIN — HYDROMORPHONE HYDROCHLORIDE 0.5 MG: 1 INJECTION, SOLUTION INTRAMUSCULAR; INTRAVENOUS; SUBCUTANEOUS at 12:44

## 2019-12-18 RX ADMIN — SODIUM CHLORIDE 100 ML/HR: 0.45 INJECTION, SOLUTION INTRAVENOUS at 14:36

## 2019-12-18 NOTE — PROGRESS NOTES
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)  In February 2018 her  shunt was removed secondary to sepsis due to complications from an attempted hiatal hernia repair resulting in gastric perforation and peritonitis  The patient has recently been complaining of similar symptoms similar to her prior presentation before being diagnosed with pseudotumor  She was recently evaluated by Ophthalmology and found to have papilledema  She was evaluated by Neurosurgery which confirmed recurrent symptomatic pseudotumor cerebri  Per Neurosurgery recommendations, a ventricular atrial shunt was scheduled for today 12/18  Surgery was uncomplicated with minimal blood loss  She remains alert and oriented with no neuro deficits following the surgery  Of note, the patient also has a history of morbid obesity status post laparoscopic vertical sleeve gastrectomy in December 2016 who underwent an exploratory laparotomy  For repair of gastric perforation in January 2018 at [facility] after an injury to the gastric sleeve occurred during attempted hiatal hernia repair  Patient developed an intra-abdominal abscess with enterocutaneous fistula formation which resolved with percutaneous drainage  Also with history of EGD with gastroesophageal stent placement in January 2018 status post removal of initial stent in February with an additional 2nd stent placed in the esophagus       History obtained from chart review and the patient   -------------------------------------------------------------------------------------------------------------  Assessment and Plan:    Neuro:    Diagnosis:  Pseudotumor cerebri status post image guided insertion of right coronal ventricular atrial shunt;   CSF studies demonstrating  Elevated WBC at 42,  Elevated  RBC  24,000,  CSF glucose 71,  CSF protein 43;  Chest x-ray 12/18 demonstrating to the ventricular atrial catheter appearing to be in the SVC, superior to the right atrium  o Plan:   Continue vancomycin 1000 mg Q 12  as scheduled with last dose  for 3:00 a m  12/19  o Repeat CT head in the morning  o  follow up CSF culture and Gram stain  o  seizure precautions /   Frequent neuro checks  Every hour/  Out of bed as tolerated  o  wound care  o  continue  Half normal saline at 100ml/hr  o  spironolactone  12 5 mg  o  PT OT  o  follow-up ophthalmology outpatient  o   Pain meds as needed  -  acetaminophen 650 mg Q 4  -  oxycodone 5 mg Q 4 p r n  Moderate pain  -  oxycodone 10 mg Q 4 p r n  Severe pain  -  Dilaudid 0 5 mg q 1 hour p r n  Breakthrough pain   Diagnosis:  papilledema  o Plan:  Follow-up outpatient ophthalmology  o  patient unable to tolerate Diamox due to kidney stones in the past  o Other ocular history includes strabismus surgery on both eyes for amblyopia as a child,  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; Choroidal nevus      CV:    Diagnosis: No active issues at this time  Plan:  Continue to monitor on telemetry      Pulm:  Diagnosis:   Chest x-ray 12/18 demonstrating small amount of right basilar pleural fluid or thickening;   Otherwise unremarkable chest  -  Continue out of bed as tolerated    GI:    Diagnosis:  Laparoscopic vertical sleeve gastrectomy   o Plan:  Continue to monitor  o  continue thiamine/calcium carbonate/ vitamin B12  o  continue bowel regimen   Diagnosis:  Hiatal hernia  o Plan:  Pantoprazole 40 mg  o  sucralfate 1000 mg Q 4      :   o  history of renal stones  -  patient was initially on Diamox for her pseudotumor cerebri, however developed a kidney stone and was put on  Spironolactone instead    Avoid Diamox and Topamax    otherwise no active issues at this time      F/E/N:    Plan:  Follow-up labs      Heme/Onc:  Patient refuses blood products for "personal beliefs"  o Diagnosis:  Follow-up labs      Endo:   o Diagnosis:  No active issues at this time      ID:    Diagnosis:  Elevated CSF WBC of 42  o Plan:  Follow up CSF culture and Gram stain   Diagnosis:  Elevated CSF RBC 24,000  o Plan:  Continue to monitor      MSK/Skin:    Diagnosis:  No active issues at this time    Disposition: Continue Critical Care   Code Status: Prior  --------------------------------------------------------------------------------------------------------------  Review of Systems   All other systems reviewed and are negative  patient complaining of pain at the incision site described as "squeezing"   double vision which she attributes to being tired,  light sensitivity   nausea   denies vomiting   denies fever aches or chills   denies chest pain, shortness breath, numbness or tingling    A 12-point, complete review of systems was reviewed and negative except as stated above     Physical Exam   Constitutional: She is oriented to person, place, and time  She appears well-developed and well-nourished  No distress  Patient lying in bed with eyes closed,  Minimally talkative   HENT:   Head: Normocephalic and atraumatic  Right Ear: External ear normal    Left Ear: External ear normal    Nose: Nose normal    Mouth/Throat: Oropharynx is clear and moist  No oropharyngeal exudate  Incision site clean   Eyes: Pupils are equal, round, and reactive to light  Conjunctivae are normal  Right eye exhibits no discharge  Left eye exhibits no discharge  No scleral icterus  Pinpoint pupils 1 mm  OU,  PERRLA,  EOMI,  Diplopia at near, esotropia OS   Neck: Normal range of motion  Cardiovascular: Normal rate, regular rhythm, normal heart sounds and intact distal pulses  Exam reveals no gallop and no friction rub  No murmur heard  Pulmonary/Chest: Effort normal and breath sounds normal  No stridor  No respiratory distress  She has no wheezes  Abdominal: Soft  Bowel sounds are normal  She exhibits no distension  There is no tenderness  Musculoskeletal: Normal range of motion  She exhibits no edema  Neurological: She is alert and oriented to person, place, and time  Cranial nerves 2-12 grossly intact,  Sensation intact,  Strength  Testing 5/5 upper and lower extremities bilaterally   Skin: She is not diaphoretic  Psychiatric: She has a normal mood and affect  Nursing note and vitals reviewed  --------------------------------------------------------------------------------------------------------------  Historical Information   Past Medical History:   Diagnosis Date    Abdominal pain     Brain condition     Pseudotumor Cerebri     Chronic kidney disease     De Quervain's disease (tenosynovitis)     Depression 1/10/2019    Esophageal perforation     Gastric leak     GERD (gastroesophageal reflux disease)     Idiopathic intracranial hypertension     Kidney stone     Migraine     No blood products     per pt: personal and Hoahaoism beliefs   surgeon office aware 12/13/19    Obesity     Papilledema, both eyes     Postgastrectomy malabsorption     Presence of lumboperitoneal shunt     Resolved: Sep 20, 2017    Rotator cuff tendinitis     Resolved: Aug 23, 2017    Visual field defect      Past Surgical History:   Procedure Laterality Date    CSF SHUNT      LP shunt - 2015 -  shunt - 2017    ESOPHAGOGASTRODUODENOSCOPY N/A 2/23/2018    Procedure: ESOPHAGOGASTRODUODENOSCOPY (EGD) WITH ESOPHAGEAL STENT PLACEMENT;  Surgeon: Brittney Serrano MD;  [facility];  Service: Thoracic    ESOPHAGOGASTRODUODENOSCOPY N/A 2/23/2018    Procedure: ESOPHAGOGASTRODUODENOSCOPY (EGD) WITH REMOVAL ESOPHAGEAL STENT  AND REPLACEMENT WITH 23mm X155mm Myah Merry;  Surgeon: Valentino Dupont, MD;  [facility];  Service: Thoracic    ESOPHAGOGASTRODUODENOSCOPY N/A 3/28/2018    Procedure: ESOPHAGOGASTRODUODENOSCOPY (EGD) with PEJ placement ;  Surgeon: Oliver Biggs MD;  [facility]; Service: Gastroenterology    ESOPHAGOGASTRODUODENOSCOPY N/A 4/5/2018    Procedure: ESOPHAGOGASTRODUODENOSCOPY (EGD) with botox injection and kaofed placement;  Surgeon: Raquel Jade DO;  [facility]; Service: Gastroenterology    ESOPHAGOGASTRODUODENOSCOPY N/A 4/10/2018    Procedure: ESOPHAGOGASTRODUODENOSCOPY (EGD) with Kaofed placement;  Surgeon: Dinh Donis MD;  [facility]: [facility]; Service: Gastroenterology    ESOPHAGOSCOPY WITH STENT INSERTION N/A 1/24/2018    Procedure: INSERTION STENT ESOPHAGEAL;  Surgeon: Sivan Villanueva MD;  [facility]; Service: Gastroenterology    EYE SURGERY Right 1980    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)     GASTRIC BYPASS  12/19/2016    Lap sleeve gastrectomy w/shunt length shortening procedure    HERNIA REPAIR      HYSTERECTOMY  03/14/2013    IR LUMBAR PUNCTURE  8/29/2018    LAPAROTOMY N/A 1/25/2018    Procedure: Exploratory Laparotomy, wash out,placement of drains, placement of NG feeding tube ; Surgeon: Carly Miranda DO;  [facility];  Service: General    LUMBAR PERITONEAL SHUNT  11/19/2015    Laparoscopic assisted    AR CREATE SHUNT:VENTRIC-PERITONEAL Right 5/31/2017    Procedure: IMAGE GUIDED CORONAL PLACEMENT OF PROGRAMABLE VENTRICULAR-PERITONEAL SHUNT, REMOVAL OF LP SHUNT ;  Surgeon: Juli Mcclure MD;  [facility];  Service: Neurosurgery    AR EGD TRANSORAL BIOPSY SINGLE/MULTIPLE N/A 6/27/2018    Procedure: ESOPHAGOGASTRODUODENOSCOPY (EGD) with padlock clip placement;  Surgeon: Mynor Hawkins MD;  [facility];   Service: Bariatrics    AR Real Peguero CSF SHUNT,W/O REPLACE Right 2018    Procedure: Removal of  shunt;  Surgeon: Kirit Malone MD;  [facility];  Service: Neurosurgery    NC REPLACEMENT/REVISION,CSF SHUNT Right 2018    Procedure: Externalization of right-sided SHUNT VENTRICULAR-PERITONEAL in anterior chest wall ribs two and three level  ;  Surgeon: Daksha Molina MD;  [facility];  Service: Neurosurgery    WRIST SURGERY Right     x3 ,      Social History   Social History     Substance and Sexual Activity   Alcohol Use No     Social History     Substance and Sexual Activity   Drug Use Yes    Frequency: 7 0 times per week    Types: Marijuana    Comment: medical marijuana, vaping & topical      Social History     Tobacco Use   Smoking Status Former Smoker    Packs/day: 0 50    Years: 20 00    Pack years: 10 00    Last attempt to quit: 2012    Years since quittin 3   Smokeless Tobacco Never Used       Family History:   Family History   Problem Relation Age of Onset    No Known Problems Mother     No Known Problems Father     Thyroid cancer Brother     Colon cancer Maternal Grandfather     Cancer Paternal Grandmother     Cancer Paternal Grandfather     Cancer Family         Gastric    Leukemia Family     Colon cancer Family     Pancreatic cancer Family      I have reviewed this patient's family history and commented on sigificant items within the HPI    Vitals:   Vitals:    19 1400 19 1415 19 1430 19 1445   BP: 120/63 133/63 125/61 131/66   Pulse: (!) 54 56 68 (!) 54   Resp: 15 14 16 16   Temp: (!) 97 4 °F (36 3 °C)  97 9 °F (36 6 °C)    TempSrc:       SpO2: 100% 100% 100% 100%   Weight:       Height:         Temp  Min: 97 3 °F (36 3 °C)  Max: 97 9 °F (36 6 °C)  IBW: 52 4 kg  Height: 5' 3" (160 cm)  Body mass index is 23 91 kg/m²    N/A    Medications:  Current Facility-Administered Medications   Medication Dose Route Frequency    acetaminophen (TYLENOL) tablet 650 mg  650 mg Oral Q4H PRN    bisacodyl (DULCOLAX) rectal suppository 10 mg  10 mg Rectal Daily PRN    [START ON 12/19/2019] calcium carbonate (OYSTER SHELL,OSCAL) 500 mg tablet 2 tablet  2 tablet Oral Daily With Breakfast    cyanocobalamin (VITAMIN B-12) tablet 100 mcg  100 mcg Oral Daily    docusate sodium (COLACE) capsule 100 mg  100 mg Oral BID    [START ON 12/19/2019] heparin (porcine) subcutaneous injection 5,000 Units  5,000 Units Subcutaneous Q8H [facility]    HYDROmorphone (DILAUDID) injection 0 5 mg  0 5 mg Intravenous Q1H PRN    lactated ringers infusion  125 mL/hr Intravenous Continuous    oxyCODONE (ROXICODONE) IR tablet 10 mg  10 mg Oral Q4H PRN    oxyCODONE (ROXICODONE) IR tablet 5 mg  5 mg Oral Q4H PRN    [START ON 12/19/2019] pantoprazole (PROTONIX) EC tablet 40 mg  40 mg Oral Early Morning    patient supplied medication  1 each Oral Daily    sodium chloride infusion 0 45 %  100 mL/hr Intravenous Continuous    spironolactone (ALDACTONE) tablet 12 5 mg  12 5 mg Oral Daily    sucralfate (CARAFATE) oral suspension 1,000 mg  1,000 mg Oral 4x Daily    thiamine (VITAMIN B1) tablet 100 mg  100 mg Oral Daily    vancomycin (VANCOCIN) IVPB (premix) 1,000 mg  1,000 mg Intravenous Q12H     Home medications:  Prior to Admission Medications   Prescriptions Last Dose Informant Patient Reported? Taking?    BIOTIN PO 12/15/2019 Self Yes Yes   Sig: Take by mouth daily    Calcium Carb-Cholecalciferol (CALCIUM 1000 + D PO) 12/15/2019 Self Yes Yes   Sig: Take 1 capsule by mouth daily   Cyanocobalamin (VITAMIN B 12 PO) 12/15/2019 Self Yes Yes   Sig: Take by mouth   Multiple Vitamin (MULTIVITAMIN) tablet 12/15/2019 Self Yes Yes   Sig: Take 1 tablet by mouth daily   NON FORMULARY 12/17/2019 at 1000  Yes Yes   Thiamine HCl (VITAMIN B1 PO) 12/15/2019 Self Yes Yes   Sig: Take 1 tablet by mouth daily   omeprazole (PriLOSEC) 40 MG capsule 12/17/2019 at 2030 Self No Yes   Sig: Take 1 capsule (40 mg total) by mouth 2 (two) times a day   patient supplied medication 12/15/2019 Self Yes Yes   Sig: Take 1 each by mouth daily bariatric vitamin, patient unsure of medication name   spironolactone (ALDACTONE) 25 mg tablet 12/15/2019 Self Yes Yes   Sig: Take 12 5 mg by mouth daily    sucralfate (CARAFATE) 1 g/10 mL suspension 12/15/2019 Self No Yes   Sig: Take 10 mL (1 g total) by mouth 4 (four) times a day      Facility-Administered Medications: None     Allergies: Allergies   Allergen Reactions    Benadryl [Diphenhydramine] Anaphylaxis     Throat closing    Phenergan [Promethazine] Anaphylaxis         Laboratory and Diagnostics:  Results from last 7 days   Lab Units 12/16/19  1345   WBC Thousand/uL 4 69   HEMOGLOBIN g/dL 10 9*   HEMATOCRIT % 37 3   PLATELETS Thousands/uL 133*   NEUTROS PCT % 63   MONOS PCT % 7     Results from last 7 days   Lab Units 12/16/19  1345   SODIUM mmol/L 140   POTASSIUM mmol/L 4 2   CHLORIDE mmol/L 108   CO2 mmol/L 30   ANION GAP mmol/L 2*   BUN mg/dL 15   CREATININE mg/dL 0 63   CALCIUM mg/dL 9 1   GLUCOSE RANDOM mg/dL 79   ALT U/L 13   AST U/L 9   ALK PHOS U/L 58   ALBUMIN g/dL 3 6   TOTAL BILIRUBIN mg/dL 0 34          Results from last 7 days   Lab Units 12/16/19  1345   INR  1 04   PTT seconds 25              ABG:    VBG:          Micro:          EKG: no recent studies  Imaging: I have personally reviewed pertinent reports  and I have personally reviewed pertinent films in PACS    ------------------------------------------------------------------------------------------------------------  Advance Directive and Living Will:      Power of :    POLST:    ------------------------------------------------------------------------------------------------------------  Anticipated Length of Stay is > 2 midnights    Counseling / Coordination of Care  Total Critical Care time spent 30 minutes excluding procedures, teaching and family updates          [facility],         Portions of the record may have been created with voice recognition software  Occasional wrong word or "sound a like" substitutions may have occurred due to the inherent limitations of voice recognition software    Read the chart carefully and recognize, using context, where substitutions have occurred

## 2019-12-18 NOTE — OP NOTE
OPERATIVE REPORT  PATIENT NAME: Julieta Pappas    :  1975  MRN: [id number]  Pt Location: [facility] OR ROOM 09    SURGERY DATE: 2019    Surgeon(s) and Role:     * Jennifer Chavez MD - Primary    Preop Diagnosis:  Pseudotumor cerebri [G93 2]  Papilledema [H47 10]    Post-Op Diagnosis Codes:     * Pseudotumor cerebri [G93 2]     * Papilledema [H47 10]    Procedure(s) (LRB):  IMAGE GUIDED INSERTION OF RIGHT CORONAL VENTRICULAR-ATRIAL SHUNT (Right)    Specimen(s):  ID Type Source Tests Collected by Time Destination   A : CSF Cerebrospinal Fluid Brain GLUCOSE, CSF, PROTEIN TOTAL, CSF, CSF WBC COUNT WITH DIFFERENTIAL, RED CELL COUNT, CSF Jennifer Chavez MD 2019 1051    B : CSF Cerebrospinal Fluid Brain CSF CULTURE AND GRAM STAIN Jennifer Chavez MD 2019 1051        Estimated Blood Loss:   Minimal    Drains:  * No LDAs found *    Anesthesia Type:   General    Operative Indications:  Pseudotumor cerebri [G93 2]  Papilledema [H47 10]    Operative Findings:  none    Complications:   None    Procedure and Technique:   After adequate general endotracheal anesthesia the patient was placed supine on the operating table  Head was turned to the left  The head was placed raven three point head fixation system and the stealth system was registered and calibrated     The hair was clipped free of the scalp  The scalp neck chest and abdomen were prepped with Betadine soap then DuraPrep  Double layer drapes were placed in normal fashion and a Betadine impregnated sticky drape was placed over these  A time-out was called and all parameters a time-out were followed    Image guidance with the use of the [address] was used throughout this case  Images were carefully loaded into the system and used for preoperative planning as well as intraoperative guidance it was critically useful for navigation and avoidance of critically important structures  The procedure began in the right coronal region    In the mid pupillary line at its intersection with the coronal line just anterior to the coronal suture a curvilinear incision was designed  The skin was injected with 1% lidocaine with 1 100,000 epinephrine  A 15  Blade was used to incise the scalp  Bovie and bipolar were used throughout the procedure to maintain hemostasis  Bovie and a cutting setting was used to divide the tissues to the underlying para cranium  A cone style we later retractor was used to maintain operative exposure  Single bur hole was made  Next attention was placed to the subxiphoid region  In the right anterior neck approximately 2 fingerbreadths below the angle of the mandible and in the line of the sternocleidomastoid muscle and incision was made through 1 of the creases of the patient's neck  The skin had been injected with 1% lidocaine with 1 100,000 epinephrine  A 15  Blade was used to in size the skin  Dissection was carried out to the region of the platysma  The platysma was opened divided and tucked under the lips of a self-retaining retractor system  Dissection was carried out along the anterior border of the sternocleidomastoid muscle  The internal jugular vein was identified and the branch from the facial vein was clearly identified  A vessel loop was placed circumferentially around this  Next 2 0 silk sutures were placed surrounding to this vessel at its origin and then also more distally  Hemostats were used to maintain hemostasis and to occlude the vessel  A passing device was then utilized to go from the coronal incision to the cervical incision  This was done under a single pass  An 0 silk suture was then utilized to secure to the base of the Passer and then pulled cephalad to the region of the coronal incision  A back to seal catheter was then connected to the shunt valve system and secured with a single 2 0 silk suture    This entire catheter system was then passed through the tract utilizing the aforementioned 0 silk suture  Next, utilizing image guidance and a PCI catheter probe, a ventricular catheter was placed into the ipsilateral frontal horn and parked in the region of the foramen of [place] at approximately 71 mm from the surface  This catheter was then utilized to obtain a 5 cc sample of cerebral spinal fluid  The catheter was cut and then connected to a [employer] programmable valve  The connection points were secured with 2 0 silk sutures  The catheter was then trimmed after the right angle bracket and secured to the valve system with a single 2 0 silk suture  The catheter system was then trimmed distally  This was clamped temporarily  The intravascular right atrial catheter was then introduced and advanced to the region of the right atrium using fluoroscopic guidance  This point was then carefully marked at its intersection with the facial vein  An in-line connector was then utilized to secure the cardiac catheter to the ventricular catheter with 2 0 silk sutures  The aforementioned 2 0 silk sutures which were placed circumferentially around the facial vein were then secured over the in-line connector  There was no blood loss in this procedure  Copious quantities of irrigation were then used to cleanse out the region  The platysma was closed with interrupted 3 0 Vicryl suture  The skin was closed with interrupted inverted 3 0 Vicryl suture  Exofin was utilized to secure the skin  In the right coronal region the galea was approximated with interrupted inverted 3 0 Vicryl suture and the skin was closed with a running 4 0 Monocryl  Exofin was placed over this  Clean sterile dressings were placed       I was present for the entire procedure    Patient Disposition:  PACU     SIGNATURE: Tor Curran MD  DATE: December 18, 2019  TIME: 11:46 AM

## 2019-12-18 NOTE — PERIOPERATIVE NURSING NOTE
Dr Lizbeth Mora notified of WBC count in CSF of 42, no treatment needed  Pt awake and alert, neurologically intact, speech clear and coherent  Moves all extremities  ,  Drsg dry and head incision intact, suture at head from pin site intact suture present

## 2019-12-18 NOTE — INTERVAL H&P NOTE
H&P reviewed  After examining the patient I find no changes in the patients condition since the H&P had been written      Vitals:    12/18/19 0647   BP: 128/66   Pulse: 57   Resp: 16   Temp: (!) 97 4 °F (36 3 °C)   SpO2: 96% Telephone Encounter by Yana Damon RN at 11/14/17 08:24 AM     Author:  Yana Damon RN Service:  (none) Author Type:  Registered Nurse     Filed:  11/14/17 08:24 AM Encounter Date:  11/9/2017 Status:  Signed     :  Yana Damon RN (Registered Nurse)            refilled per below[LI1.1M]      Revision History        User Key Date/Time User Provider Type Action    > LI1.1 11/14/17 08:24 AM Yana Damon RN Registered Nurse Sign    M - Manual

## 2019-12-19 ENCOUNTER — APPOINTMENT (INPATIENT)
Dept: RADIOLOGY | Facility: HOSPITAL | Age: 44
DRG: 022 | End: 2019-12-19
Payer: COMMERCIAL

## 2019-12-19 LAB
ANION GAP SERPL CALCULATED.3IONS-SCNC: 4 MMOL/L (ref 4–13)
ANION GAP SERPL CALCULATED.3IONS-SCNC: 6 MMOL/L (ref 4–13)
APTT PPP: 24 SECONDS (ref 23–37)
BASOPHILS # BLD MANUAL: 0 THOUSAND/UL (ref 0–0.1)
BASOPHILS NFR MAR MANUAL: 0 % (ref 0–1)
BUN SERPL-MCNC: 11 MG/DL (ref 5–25)
BUN SERPL-MCNC: 12 MG/DL (ref 5–25)
CALCIUM SERPL-MCNC: 9 MG/DL (ref 8.3–10.1)
CALCIUM SERPL-MCNC: 9.2 MG/DL (ref 8.3–10.1)
CHLORIDE SERPL-SCNC: 105 MMOL/L (ref 100–108)
CHLORIDE SERPL-SCNC: 106 MMOL/L (ref 100–108)
CO2 SERPL-SCNC: 27 MMOL/L (ref 21–32)
CO2 SERPL-SCNC: 27 MMOL/L (ref 21–32)
CREAT SERPL-MCNC: 0.69 MG/DL (ref 0.6–1.3)
CREAT SERPL-MCNC: 0.75 MG/DL (ref 0.6–1.3)
EOSINOPHIL # BLD MANUAL: 0 THOUSAND/UL (ref 0–0.4)
EOSINOPHIL NFR BLD MANUAL: 0 % (ref 0–6)
ERYTHROCYTE [DISTWIDTH] IN BLOOD BY AUTOMATED COUNT: 15 % (ref 11.6–15.1)
ERYTHROCYTE [DISTWIDTH] IN BLOOD BY AUTOMATED COUNT: 15.2 % (ref 11.6–15.1)
GFR SERPL CREATININE-BSD FRML MDRD: 105 ML/MIN/1.73SQ M
GFR SERPL CREATININE-BSD FRML MDRD: 97 ML/MIN/1.73SQ M
GLUCOSE SERPL-MCNC: 126 MG/DL (ref 65–140)
GLUCOSE SERPL-MCNC: 154 MG/DL (ref 65–140)
HCT VFR BLD AUTO: 33.4 % (ref 34.8–46.1)
HCT VFR BLD AUTO: 35.6 % (ref 34.8–46.1)
HGB BLD-MCNC: 10.6 G/DL (ref 11.5–15.4)
HGB BLD-MCNC: 9.8 G/DL (ref 11.5–15.4)
HYPERCHROMIA BLD QL SMEAR: PRESENT
INR PPP: 1.08 (ref 0.84–1.19)
LYMPHOCYTES # BLD AUTO: 0.43 THOUSAND/UL (ref 0.6–4.47)
LYMPHOCYTES # BLD AUTO: 4 % (ref 14–44)
MAGNESIUM SERPL-MCNC: 1.7 MG/DL (ref 1.6–2.6)
MCH RBC QN AUTO: 24.9 PG (ref 26.8–34.3)
MCH RBC QN AUTO: 24.9 PG (ref 26.8–34.3)
MCHC RBC AUTO-ENTMCNC: 29.3 G/DL (ref 31.4–37.4)
MCHC RBC AUTO-ENTMCNC: 29.8 G/DL (ref 31.4–37.4)
MCV RBC AUTO: 84 FL (ref 82–98)
MCV RBC AUTO: 85 FL (ref 82–98)
MONOCYTES # BLD AUTO: 0.43 THOUSAND/UL (ref 0–1.22)
MONOCYTES NFR BLD: 4 % (ref 4–12)
NEUTROPHILS # BLD MANUAL: 9.85 THOUSAND/UL (ref 1.85–7.62)
NEUTS SEG NFR BLD AUTO: 92 % (ref 43–75)
NRBC BLD AUTO-RTO: 0 /100 WBCS
PHOSPHATE SERPL-MCNC: 3.1 MG/DL (ref 2.7–4.5)
PLATELET # BLD AUTO: 137 THOUSANDS/UL (ref 149–390)
PLATELET # BLD AUTO: 93 THOUSANDS/UL (ref 149–390)
PLATELET BLD QL SMEAR: ABNORMAL
PMV BLD AUTO: 11.3 FL (ref 8.9–12.7)
PMV BLD AUTO: 12.4 FL (ref 8.9–12.7)
POIKILOCYTOSIS BLD QL SMEAR: PRESENT
POTASSIUM SERPL-SCNC: 4.4 MMOL/L (ref 3.5–5.3)
POTASSIUM SERPL-SCNC: 4.5 MMOL/L (ref 3.5–5.3)
PROTHROMBIN TIME: 13.6 SECONDS (ref 11.6–14.5)
RBC # BLD AUTO: 3.94 MILLION/UL (ref 3.81–5.12)
RBC # BLD AUTO: 4.25 MILLION/UL (ref 3.81–5.12)
RBC MORPH BLD: PRESENT
SODIUM SERPL-SCNC: 136 MMOL/L (ref 136–145)
SODIUM SERPL-SCNC: 139 MMOL/L (ref 136–145)
WBC # BLD AUTO: 10.1 THOUSAND/UL (ref 4.31–10.16)
WBC # BLD AUTO: 10.71 THOUSAND/UL (ref 4.31–10.16)

## 2019-12-19 PROCEDURE — 99233 SBSQ HOSP IP/OBS HIGH 50: CPT | Performed by: INTERNAL MEDICINE

## 2019-12-19 PROCEDURE — ND001 PR NO DOCUMENTATION: Performed by: STUDENT IN AN ORGANIZED HEALTH CARE EDUCATION/TRAINING PROGRAM

## 2019-12-19 PROCEDURE — 85730 THROMBOPLASTIN TIME PARTIAL: CPT | Performed by: INTERNAL MEDICINE

## 2019-12-19 PROCEDURE — 80048 BASIC METABOLIC PNL TOTAL CA: CPT | Performed by: INTERNAL MEDICINE

## 2019-12-19 PROCEDURE — 99024 POSTOP FOLLOW-UP VISIT: CPT | Performed by: NURSE PRACTITIONER

## 2019-12-19 PROCEDURE — 97162 PT EVAL MOD COMPLEX 30 MIN: CPT

## 2019-12-19 PROCEDURE — G8978 MOBILITY CURRENT STATUS: HCPCS

## 2019-12-19 PROCEDURE — 83735 ASSAY OF MAGNESIUM: CPT | Performed by: INTERNAL MEDICINE

## 2019-12-19 PROCEDURE — 85007 BL SMEAR W/DIFF WBC COUNT: CPT | Performed by: INTERNAL MEDICINE

## 2019-12-19 PROCEDURE — 85610 PROTHROMBIN TIME: CPT | Performed by: INTERNAL MEDICINE

## 2019-12-19 PROCEDURE — G8987 SELF CARE CURRENT STATUS: HCPCS

## 2019-12-19 PROCEDURE — 85027 COMPLETE CBC AUTOMATED: CPT | Performed by: INTERNAL MEDICINE

## 2019-12-19 PROCEDURE — G8988 SELF CARE GOAL STATUS: HCPCS

## 2019-12-19 PROCEDURE — G8989 SELF CARE D/C STATUS: HCPCS

## 2019-12-19 PROCEDURE — 97165 OT EVAL LOW COMPLEX 30 MIN: CPT

## 2019-12-19 PROCEDURE — 70450 CT HEAD/BRAIN W/O DYE: CPT

## 2019-12-19 PROCEDURE — 84100 ASSAY OF PHOSPHORUS: CPT | Performed by: INTERNAL MEDICINE

## 2019-12-19 RX ORDER — CALCIUM CARBONATE 200(500)MG
500 TABLET,CHEWABLE ORAL 2 TIMES DAILY PRN
Status: DISCONTINUED | OUTPATIENT
Start: 2019-12-19 | End: 2019-12-22 | Stop reason: HOSPADM

## 2019-12-19 RX ORDER — METOCLOPRAMIDE HYDROCHLORIDE 5 MG/ML
10 INJECTION INTRAMUSCULAR; INTRAVENOUS EVERY 6 HOURS PRN
Status: DISCONTINUED | OUTPATIENT
Start: 2019-12-19 | End: 2019-12-22 | Stop reason: HOSPADM

## 2019-12-19 RX ORDER — HYDROMORPHONE HCL/PF 1 MG/ML
0.5 SYRINGE (ML) INJECTION EVERY 4 HOURS PRN
Status: DISCONTINUED | OUTPATIENT
Start: 2019-12-19 | End: 2019-12-20

## 2019-12-19 RX ORDER — DEXAMETHASONE SODIUM PHOSPHATE 4 MG/ML
4 INJECTION, SOLUTION INTRA-ARTICULAR; INTRALESIONAL; INTRAMUSCULAR; INTRAVENOUS; SOFT TISSUE ONCE
Status: COMPLETED | OUTPATIENT
Start: 2019-12-19 | End: 2019-12-19

## 2019-12-19 RX ORDER — SCOLOPAMINE TRANSDERMAL SYSTEM 1 MG/1
1 PATCH, EXTENDED RELEASE TRANSDERMAL
Status: DISCONTINUED | OUTPATIENT
Start: 2019-12-19 | End: 2019-12-22 | Stop reason: HOSPADM

## 2019-12-19 RX ORDER — METOCLOPRAMIDE HYDROCHLORIDE 5 MG/ML
10 INJECTION INTRAMUSCULAR; INTRAVENOUS ONCE
Status: COMPLETED | OUTPATIENT
Start: 2019-12-19 | End: 2019-12-19

## 2019-12-19 RX ADMIN — PANTOPRAZOLE SODIUM 40 MG: 40 TABLET, DELAYED RELEASE ORAL at 05:00

## 2019-12-19 RX ADMIN — CALCIUM CARBONATE (ANTACID) CHEW TAB 500 MG 500 MG: 500 CHEW TAB at 21:35

## 2019-12-19 RX ADMIN — HYDROMORPHONE HYDROCHLORIDE 0.5 MG: 1 INJECTION, SOLUTION INTRAMUSCULAR; INTRAVENOUS; SUBCUTANEOUS at 02:27

## 2019-12-19 RX ADMIN — DOCUSATE SODIUM 100 MG: 100 CAPSULE, LIQUID FILLED ORAL at 17:15

## 2019-12-19 RX ADMIN — METOCLOPRAMIDE 10 MG: 5 INJECTION, SOLUTION INTRAMUSCULAR; INTRAVENOUS at 09:00

## 2019-12-19 RX ADMIN — HYDROMORPHONE HYDROCHLORIDE 0.5 MG: 1 INJECTION, SOLUTION INTRAMUSCULAR; INTRAVENOUS; SUBCUTANEOUS at 09:04

## 2019-12-19 RX ADMIN — ONDANSETRON 4 MG: 2 INJECTION INTRAMUSCULAR; INTRAVENOUS at 02:02

## 2019-12-19 RX ADMIN — DOCUSATE SODIUM 100 MG: 100 CAPSULE, LIQUID FILLED ORAL at 09:04

## 2019-12-19 RX ADMIN — HYDROMORPHONE HYDROCHLORIDE 0.5 MG: 1 INJECTION, SOLUTION INTRAMUSCULAR; INTRAVENOUS; SUBCUTANEOUS at 19:18

## 2019-12-19 RX ADMIN — SODIUM CHLORIDE 100 ML/HR: 0.45 INJECTION, SOLUTION INTRAVENOUS at 02:31

## 2019-12-19 RX ADMIN — SCOPALAMINE 1 PATCH: 1 PATCH, EXTENDED RELEASE TRANSDERMAL at 14:07

## 2019-12-19 RX ADMIN — HYDROMORPHONE HYDROCHLORIDE 0.5 MG: 1 INJECTION, SOLUTION INTRAMUSCULAR; INTRAVENOUS; SUBCUTANEOUS at 14:07

## 2019-12-19 RX ADMIN — DEXAMETHASONE SODIUM PHOSPHATE 4 MG: 4 INJECTION, SOLUTION INTRAMUSCULAR; INTRAVENOUS at 02:27

## 2019-12-19 RX ADMIN — HEPARIN SODIUM 5000 UNITS: 5000 INJECTION INTRAVENOUS; SUBCUTANEOUS at 21:35

## 2019-12-19 RX ADMIN — METOCLOPRAMIDE 10 MG: 5 INJECTION, SOLUTION INTRAMUSCULAR; INTRAVENOUS at 02:15

## 2019-12-19 RX ADMIN — METOCLOPRAMIDE 10 MG: 5 INJECTION, SOLUTION INTRAMUSCULAR; INTRAVENOUS at 19:34

## 2019-12-19 NOTE — OCCUPATIONAL THERAPY NOTE
[address] Evaluation     Patient Name: Mani Crum  THDEX'K Date: 12/19/2019  Problem List  Principal Problem:    Pseudotumor cerebri  Active Problems:    Gastroesophageal reflux disease    Postgastrectomy malabsorption    Papilledema    Past Medical History  Past Medical History:   Diagnosis Date    Abdominal pain     Brain condition     Pseudotumor Cerebri     Chronic kidney disease     De Quervain's disease (tenosynovitis)     Depression 1/10/2019    Esophageal perforation     Gastric leak     GERD (gastroesophageal reflux disease)     Idiopathic intracranial hypertension     Kidney stone     Migraine     No blood products     per pt: personal and Taoist beliefs  surgeon office aware 12/13/19    Obesity     Papilledema, both eyes     Postgastrectomy malabsorption     Presence of lumboperitoneal shunt     Resolved: Sep 20, 2017    Rotator cuff tendinitis     Resolved: Aug 23, 2017    Visual field defect      Past Surgical History  Past Surgical History:   Procedure Laterality Date    CSF SHUNT      LP shunt - 2015 -  shunt - 2017    ESOPHAGOGASTRODUODENOSCOPY N/A 2/23/2018    Procedure: ESOPHAGOGASTRODUODENOSCOPY (EGD) WITH ESOPHAGEAL STENT PLACEMENT;  Surgeon: Sofy Jarrell MD;  [facility];  Service: Thoracic    ESOPHAGOGASTRODUODENOSCOPY N/A 2/23/2018    Procedure: ESOPHAGOGASTRODUODENOSCOPY (EGD) WITH REMOVAL ESOPHAGEAL STENT  AND REPLACEMENT WITH 23mm X155mm 1111 [address] [place] [address],[address];  Surgeon: Sofy Jarrell MD;  [facility];  Service: Thoracic    ESOPHAGOGASTRODUODENOSCOPY N/A 3/28/2018    Procedure: ESOPHAGOGASTRODUODENOSCOPY (EGD) with PEJ placement ;  Surgeon: Clementina Hidalgo MD;  [facility]; Service: Gastroenterology    ESOPHAGOGASTRODUODENOSCOPY N/A 4/5/2018    Procedure: ESOPHAGOGASTRODUODENOSCOPY (EGD) with botox injection and kaofed placement;  Surgeon: Romana Barth, DO;  [facility];   Service: Gastroenterology  ESOPHAGOGASTRODUODENOSCOPY N/A 4/10/2018    Procedure: ESOPHAGOGASTRODUODENOSCOPY (EGD) with Kaofed placement;  Surgeon: Talya Taylor MD;  [facility]; Service: Gastroenterology    ESOPHAGOSCOPY WITH STENT INSERTION N/A 1/24/2018    Procedure: INSERTION STENT ESOPHAGEAL;  Surgeon: Agustin Hernandez MD;  [facility]; Service: Gastroenterology    EYE SURGERY Right 1980    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)     GASTRIC BYPASS  12/19/2016    Lap sleeve gastrectomy w/shunt length shortening procedure    HERNIA REPAIR      HYSTERECTOMY  03/14/2013    IR LUMBAR PUNCTURE  8/29/2018    LAPAROTOMY N/A 1/25/2018    Procedure: Exploratory Laparotomy, wash out,placement of drains, placement of NG feeding tube ; Surgeon: Horacio Robison DO;  [facility];  Service: General    LUMBAR PERITONEAL SHUNT  11/19/2015    Laparoscopic assisted    MD CREATE SHUNT:VENTRIC-ATRIAL Right 12/18/2019    Procedure: IMAGE GUIDED INSERTION OF RIGHT CORONAL VENTRICULAR-ATRIAL SHUNT;  Surgeon: Rafa Cabrera MD;  [facility];  Service: Neurosurgery    MD CREATE SHUNT:VENTRIC-PERITONEAL Right 5/31/2017    Procedure: IMAGE GUIDED CORONAL PLACEMENT OF PROGRAMABLE VENTRICULAR-PERITONEAL SHUNT, REMOVAL OF LP SHUNT ;  Surgeon: Rafa Cabrera MD;  [facility];  Service: Neurosurgery    MD EGD TRANSORAL BIOPSY SINGLE/MULTIPLE N/A 6/27/2018    Procedure: ESOPHAGOGASTRODUODENOSCOPY (EGD) with padlock clip placement;  Surgeon: Ani Glaser MD;  [facility];   Service: [facility] CSF SHUNT,W/O REPLACE Right 2/5/2018    Procedure: Removal of  shunt;  Surgeon: Rafa Cabrera MD;  [facility];  Service: Neurosurgery    MD REPLACEMENT/REVISION,CSF SHUNT Right 1/25/2018    Procedure: Externalization of right-sided SHUNT VENTRICULAR-PERITONEAL in anterior chest wall ribs two and three level  ;  Surgeon: Suha Sarah MD;  [facility];  Service: Neurosurgery    WRIST SURGERY Right     x3 2006, 2008 12/19/19 1300   Note Type   Note type Eval only   Restrictions/Precautions   Weight Bearing Precautions Per Order No   Other Precautions Pain   Pain Assessment   Pain Assessment 0-10   Pain Score 7   Pain Type Acute pain   Pain Location Head   Hospital Pain Intervention(s) Distraction; Emotional support; Ambulation/increased activity;Repositioned   Response to Interventions TOLERATED    Home Living   Type of [address] to live on main level with bedroom/bathroom;Stairs to enter with rails  (1 KAVITA )   Additional Comments NO USE OF DME AT BASELINE    Prior Function   Level of [place] Independent with ADLs and functional mobility   Lives With Son   Receives Help From Family   ADL Assistance Independent   IADLs Independent   Falls in the last 6 months 0   Lifestyle   Autonomy PT REPORTS BEING I WITH ADLS/IADLS/DRIVING    Reciprocal Relationships LIVES WITH SON    Service to Others PT WITH LIMITED CONVERSATION    Intrinsic Gratification PT WITH LIMITED CONVERSATION   ADL   Eating Assistance 7  Independent   Grooming Assistance 7  Independent   UB Bathing Assistance 7  Independent   LB Bathing Assistance 7  Independent   UB Dressing Assistance 7  Independent   LB Dressing Assistance 7  [address] [facility] 7  Independent   Bed Mobility   Supine to Sit 6  Modified independent   Sit to Supine 6  Modified independent   Transfers   Sit to Stand 7  Independent   Stand to Sit 7  Independent   Toilet transfer 7  Independent   Additional items Standard toilet   Functional Mobility   Functional Mobility 7  Independent   Balance   Static Sitting Good   Static Standing [facility] +   Activity Tolerance   Activity Tolerance Patient tolerated treatment well;Patient limited by fatigue   Medical Staff Made Aware SPOKE TO CM REGARDING D/C [facility] TO SEE PER RN    RUE Assessment   RUE Assessment WFL   LUE Assessment   LUE Assessment WFL   Hand Function   Gross Motor Coordination Functional   Fine Motor Coordination Functional   Sensation   Light Touch No apparent deficits   Vision - Complex Assessment   Additional Comments PT REPORTS NO VISUAL CHANGES HOWEVER PT'S CHART REPORTS MILD BLURRY VISION/DIPOLOPIA    Cognition   Overall Cognitive Status WFL   Arousal/Participation Alert   Attention Within functional limits   Orientation Level Oriented X4   Memory Within functional limits   Following Commands Follows all commands and directions without difficulty   Comments PT APPEARS DISINTERESTED WITH LIMITED CONVERSATION AND NOT RECEPTIVE TO EDUCATION    Assessment   Assessment 37 YO Female SEEN FOR INITIAL OCCUPATIONAL THERAPY EVALUATION S/P IMAGINE GUIDED INSERTION OF R CORONAL VENTRICULAR-ATRIAL SHUNT 2' PSEUDOTUMOR CEREBRI AND PAPILLEDEMA  PROBLEMS LIST INCLUDES NAUSEA, CKD, DEPRESSION, HTN, KIDNEY STONES, AND VISUAL DEFICITS INCLUDING BLURRY VISION/ DIPLOPIA  UPON ARRIVAL, PT LAYING IN BED IN DARK ROOM, PRESENTS WITH LIMITED CONVERSATION/DISINTERESTED T/O SESSION  PT IS FROM HOME WITH SON WHERE SHE REPORTS BEING INDEPENDENT WITH ADLS/IADLS/DRIVING PTA  PT CURRENTLY FUNCTIONING SIMILAR TO BASELINE FOR ADLS, TRANSFERS AND FUNCTIONAL MOBILITY  PT IS EXPERIENCING EXPECTED LIMITATIONS 2' PAIN, FATIGUE and OVERALL LIMITED ACTIVITY TOLERANCE  PT EDUCATED ON INCREASED FAMILY SUPPORT  FROM AN OCCUPATIONAL THERAPY PERSPECTIVE, PT CAN RETURN HOME WITH INCREASED FAMILY SUPPORT WHEN MEDICALLY CLEARED  PT WITH NO QUESTIONS/CONCERNS  D/C OT      Goals   Patient Goals TO RETURN TO BED    Recommendation   OT Discharge Recommendation Home with family support   OT - OK to Discharge Yes   Barthel Index   Feeding 10   Bathing 5   Grooming Score 5   Dressing Score 10   Bladder Score 10   Bowels Score 10   Toilet Use Score 10   Transfers (Bed/Chair) Score 15   Mobility (Level Surface) Score 15   Stairs Score 0   Barthel Index Score 90   Modified [place] Scale   Modified [place] Scale 2       Documentation completed by Milady Jordan, RUTH, OTR/L

## 2019-12-19 NOTE — ASSESSMENT & PLAN NOTE
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

## 2019-12-19 NOTE — PLAN OF CARE
Problem: Prexisting or High Potential for Compromised Skin Integrity  Goal: Skin integrity is maintained or improved  Description  INTERVENTIONS:  - Identify patients at risk for skin breakdown  - Assess and monitor skin integrity  - Assess and monitor nutrition and hydration status  - Monitor labs   - Assess for incontinence   - Turn and reposition patient  - Assist with mobility/ambulation  - Relieve pressure over bony prominences  - Avoid friction and shearing  - Provide appropriate hygiene as needed including keeping skin clean and dry  - Evaluate need for skin moisturizer/barrier cream  - Collaborate with interdisciplinary team   - Patient/family teaching  - Consider wound care consult   Outcome: Progressing     Problem: Potential for Falls  Goal: Patient will remain free of falls  Description  INTERVENTIONS:  - Assess patient frequently for physical needs  -  Identify cognitive and physical deficits and behaviors that affect risk of falls    -  [place] fall precautions as indicated by assessment   - Educate patient/family on patient safety including physical limitations  - Instruct patient to call for assistance with activity based on assessment  - Modify environment to reduce risk of injury  - Consider OT/PT consult to assist with strengthening/mobility  Outcome: Progressing

## 2019-12-19 NOTE — PROGRESS NOTES
IM Residency Progress Note   Unit/Bed#: [facility] 619-01 Encounter: [id number]  SOD Team C       Radha Brooks 38 y o  female [id number]    Hospital Stay Days: 1      Assessment/Plan:    Principal Problem:    Pseudotumor cerebri  Active Problems:    Gastroesophageal reflux disease    Postgastrectomy malabsorption    Papilledema    1  Pseudotumor cerebri status post image guided insertion of right coronal ventricular atrial shunt  - postop day 1  - CSF studies demonstrate elevated WBC at 42, elevated RBC at 24,000, CSF glucose 71, CSF protein 43  - CXR 12/18/2019 demonstrated ventricular atrial catheter appearing to be in the SVC, superior to the right atrium  - status post vancomycin dosing, last dosed at 3:00 a m  This morning  - follow up CSF cultures and Gram stain  - continue seizure precautions  - continue frequent neuro checks as per protocol  - out of bed as tolerated and with assistance  - continue local wound care  - continue IVF half-normal saline 100 mL/hr  - continue spironolactone 12 5 mg  - PT/OT when appropriate  - continue pain regimen consisting of scheduled acetaminophen 650 mg q 4 hours, oxycodone 5 mg q 4 hours p r n  For moderate pain, oxycodone 10 mg q 4 hours p r n  For severe pain, and Dilaudid 0 5 mg q 1 hour p r n  For breakthrough  - bowel regimen in place  - neurosurgery to follow    2  Papilledema  - unable to tolerate Diamox  - denies diplopia this morning, reports some blurriness when looking at her phone 1st thing this morning  - continue to monitor closely    3   Postoperative vomiting  - multiple episodes of nausea with vomiting yesterday and overnight following procedure  - repeat CTH obtained yesterday revealed normal ventricular size, small subdural hematoma along the right frontal lobe and posterior right falx that is likely postoperative  - episodes of vomiting were refractory to Zofran, minimal symptomatic relief noted with Reglan, so patient was given 1 time dose of Decadron early this morning which patient says provided some relief  - continue IVF  - patient has not eaten since procedure, advanced diet as tolerated  - continue to monitor closely    4  GERD  - continue pantoprazole and sucralfate    5  Post gastrectomy malabsorption  - continue vitamins and supplements    Disposition:  Inpatient, pending further monitoring and clinical stabilization   Care discussed at length with RN  All questions answered at this time  Spoke with Neurosurgery advanced practitioner  Her surgery service will resume primary care of this patient during course of her hospitalization  Internal Medicine will now sign off  Please call with any questions or concerns  Thank you  Subjective:   Patient seen and examined this morning  Continues to report significant nausea  At time of exam, a meal tray was brought in to the room and patient stated that she cannot even look at food at this time  Urgently requested emesis basin in anticipation of further vomiting  States pain is relatively under control on current regimen  Admits to some blurry vision this morning when looking at her phone  Denies any headache, lightheadedness, dizziness, alteration in hearing, alteration in taste, numbness/tingling, weakness, or gross changes in sensation  Review of systems otherwise negative, with patient specifically denying any recent fevers, chills, chest pains, palpitations, shortness of breath, coughing, wheezing, abdominal pain, diarrhea, constipation, or muscle aches or pains         Vitals: Temp (24hrs), Av 7 °F (36 5 °C), Min:97 3 °F (36 3 °C), Max:98 4 °F (36 9 °C)  Current: Temperature: (!) 97 3 °F (36 3 °C)  Vitals:    19 0227 19 0328 19 0716 19 1116   BP:  115/64 108/61 108/61   BP Location:   Right arm Right arm   Pulse: (!) 54 79 61 64   Resp: 12 18 16 16   Temp:  98 °F (36 7 °C) 98 4 °F (36 9 °C) (!) 97 3 °F (36 3 °C)   TempSrc:   Oral Oral   SpO2: 97% 98% 98% 100% Weight:       Height:        Body mass index is 23 91 kg/m²  I/O last 24 hours: In: 3065 1 [P O :170; I V :2645 1; IV Piggyback:250]  Out: 8422 [Urine:1175; Emesis/NG output:150; Blood:10]      Physical Exam: /61 (BP Location: Right arm)   Pulse 64   Temp (!) 97 3 °F (36 3 °C) (Oral)   Resp 16   Ht 5' 3" (1 6 m)   Wt 61 2 kg (135 lb)   LMP  (LMP Unknown)   SpO2 100%   BMI 23 91 kg/m²   General appearance: alert and oriented, in no acute distress  Head: Scar overlying the right side of the crown of patient's head, sutures appear intact with no obvious signs of drainage, site appears clean/dry/intact  Eyes: conjunctivae/corneas clear  PERRL, EOM's intact  Fundi benign  Lungs: clear to auscultation bilaterally  Heart: regular rate and rhythm, S1, S2 normal, no murmur, click, rub or gallop  Abdomen: soft, non-tender; bowel sounds normal; no masses,  no organomegaly  Extremities: extremities normal, warm and well-perfused; no cyanosis, clubbing, or edema  Pulses: 2+ and symmetric  Neurologic: Alert and oriented X 3, normal strength and tone  Normal symmetric reflexes   Normal coordination and gait     Invasive Devices     Peripheral Intravenous Line            Peripheral IV 12/18/19 Right;Dorsal (posterior) Hand 1 day                          Labs:   Recent Results (from the past 24 hour(s))   Platelet count    Collection Time: 12/18/19  6:37 PM   Result Value Ref Range    Platelets 513 (L) 489 - 390 Thousands/uL    MPV 10 7 8 9 - 12 7 fL   CBC    Collection Time: 12/19/19  2:03 AM   Result Value Ref Range    WBC 10 10 4 31 - 10 16 Thousand/uL    RBC 4 25 3 81 - 5 12 Million/uL    Hemoglobin 10 6 (L) 11 5 - 15 4 g/dL    Hematocrit 35 6 34 8 - 46 1 %    MCV 84 82 - 98 fL    MCH 24 9 (L) 26 8 - 34 3 pg    MCHC 29 8 (L) 31 4 - 37 4 g/dL    RDW 15 0 11 6 - 15 1 %    Platelets 398 (L) 317 - 390 Thousands/uL    MPV 11 3 8 9 - 12 7 fL   Basic metabolic panel    Collection Time: 12/19/19  2:03 AM   Result Value Ref Range    Sodium 139 136 - 145 mmol/L    Potassium 4 4 3 5 - 5 3 mmol/L    Chloride 106 100 - 108 mmol/L    CO2 27 21 - 32 mmol/L    ANION GAP 6 4 - 13 mmol/L    BUN 12 5 - 25 mg/dL    Creatinine 0 75 0 60 - 1 30 mg/dL    Glucose 126 65 - 140 mg/dL    Calcium 9 2 8 3 - 10 1 mg/dL    eGFR 97 ml/min/1 73sq m   Protime-INR    Collection Time: 12/19/19  5:06 AM   Result Value Ref Range    Protime 13 6 11 6 - 14 5 seconds    INR 1 08 0 84 - 1 19   APTT    Collection Time: 12/19/19  5:06 AM   Result Value Ref Range    PTT 24 23 - 37 seconds   CBC and differential    Collection Time: 12/19/19  5:06 AM   Result Value Ref Range    WBC 10 71 (H) 4 31 - 10 16 Thousand/uL    RBC 3 94 3 81 - 5 12 Million/uL    Hemoglobin 9 8 (L) 11 5 - 15 4 g/dL    Hematocrit 33 4 (L) 34 8 - 46 1 %    MCV 85 82 - 98 fL    MCH 24 9 (L) 26 8 - 34 3 pg    MCHC 29 3 (L) 31 4 - 37 4 g/dL    RDW 15 2 (H) 11 6 - 15 1 %    MPV 12 4 8 9 - 12 7 fL    Platelets 93 (L) 751 - 390 Thousands/uL    nRBC 0 /100 WBCs   Basic metabolic panel    Collection Time: 12/19/19  5:06 AM   Result Value Ref Range    Sodium 136 136 - 145 mmol/L    Potassium 4 5 3 5 - 5 3 mmol/L    Chloride 105 100 - 108 mmol/L    CO2 27 21 - 32 mmol/L    ANION GAP 4 4 - 13 mmol/L    BUN 11 5 - 25 mg/dL    Creatinine 0 69 0 60 - 1 30 mg/dL    Glucose 154 (H) 65 - 140 mg/dL    Calcium 9 0 8 3 - 10 1 mg/dL    eGFR 105 ml/min/1 73sq m   Magnesium    Collection Time: 12/19/19  5:06 AM   Result Value Ref Range    Magnesium 1 7 1 6 - 2 6 mg/dL   Phosphorus    Collection Time: 12/19/19  5:06 AM   Result Value Ref Range    Phosphorus 3 1 2 7 - 4 5 mg/dL   Manual Differential(PHLEBS Do Not Order)    Collection Time: 12/19/19  5:06 AM   Result Value Ref Range    Segmented % 92 (H) 43 - 75 %    Lymphocytes % 4 (L) 14 - 44 %    Monocytes % 4 4 - 12 %    Eosinophils, % 0 0 - 6 %    Basophils % 0 0 - 1 %    Absolute Neutrophils 9 85 (H) 1 85 - 7 62 Thousand/uL    Lymphocytes Absolute 0 43 (L) 0 60 - 4 47 Thousand/uL    Monocytes Absolute 0 43 0 00 - 1 22 Thousand/uL    Eosinophils Absolute 0 00 0 00 - 0 40 Thousand/uL    Basophils Absolute 0 00 0 00 - 0 10 Thousand/uL    Total Counted      RBC Morphology Present     Hypochromia Present     Poikilocytes Present     Platelet Estimate Decreased (A) Adequate       Radiology Results: I have personally reviewed pertinent reports  Other Diagnostic Testing:   I have personally reviewed pertinent reports          Active Meds:   Current Facility-Administered Medications   Medication Dose Route Frequency    acetaminophen (TYLENOL) tablet 650 mg  650 mg Oral Q4H PRN    bisacodyl (DULCOLAX) rectal suppository 10 mg  10 mg Rectal Daily PRN    calcium carbonate (OYSTER SHELL,OSCAL) 500 mg tablet 2 tablet  2 tablet Oral Daily With Breakfast    cyanocobalamin (VITAMIN B-12) tablet 100 mcg  100 mcg Oral Daily    docusate sodium (COLACE) capsule 100 mg  100 mg Oral BID    heparin (porcine) subcutaneous injection 5,000 Units  5,000 Units Subcutaneous Q8H [facility]    HYDROmorphone (DILAUDID) injection 0 5 mg  0 5 mg Intravenous Q4H PRN    metoclopramide (REGLAN) injection 10 mg  10 mg Intravenous Q6H PRN    oxyCODONE (ROXICODONE) IR tablet 5 mg  5 mg Oral Q4H PRN    pantoprazole (PROTONIX) EC tablet 40 mg  40 mg Oral Early Morning    sodium chloride infusion 0 45 %  100 mL/hr Intravenous Continuous    spironolactone (ALDACTONE) tablet 12 5 mg  12 5 mg Oral Daily    sucralfate (CARAFATE) oral suspension 1,000 mg  1,000 mg Oral 4x Daily    thiamine (VITAMIN B1) tablet 100 mg  100 mg Oral Daily         VTE Pharmacologic Prophylaxis: Reason for no pharmacologic prophylaxis Postop with small subdural hemorrhage identified on radiograph  VTE Mechanical Prophylaxis: sequential compression device    Lou Vallecillo DO

## 2019-12-19 NOTE — PROGRESS NOTES
Progress Note - Cynthia Bullion 1975, 38 y o  female MRN: [id number]    Unit/Bed#: [facility] 033-70 Encounter: [id number]    Primary Care Provider: Terry Ortiz DO   Date and time admitted to hospital: 12/18/2019  5:48 AM        * Pseudotumor cerebri  Assessment & Plan  POD#1 image guided insertion of right coronal ventricular-atrial shunt (right)  Assessment:  · Feeling nauseated this morning  Denies headache  States head pressure has improved since surgery  Denies any chest pain or shortness of breath  · No neuro-focal deficits on exam   · Right side cranial insertion site clean, dry and intact  Right neck site incision clean and dry  Imaging:  · CT head 12/18/19:  Status post right frontal  shunt placement with tip over the 3rd ventricle  Ventricles are normal in size  Small subdural hematomas along the right frontal lobe and posterior right falx are likely postoperative the clinical correlation is recommended  · CXR 12/18/19:  Tip of ventricular atrial catheter appears to be in the SVC, superior to the right atrium  Small amount of right basilar pleural fluid or thickening  Plan:  · Frequent neuro checks  · STAT CT head if decrease in GCS > 2 pts in one hour  · Continue spirinolactone 12 5 mg daily  · Repeat CT head today pending  · Post-op pain well-controlled  · CSF fluid sent:  · RBC 24,000  · WBC 42  · Protein 43  · Glucose 71  · Cx and gram stain NTD  · Mobilize with PT/OT  · DVT ppx: SCDs and HSC  · Neurosurgery will continue to follow as primary  Please call with any questions or concerns  Subjective/Objective   Chief Complaint: "I'm tired and nauseous "    Subjective: Patient states headache has improved since surgery  States did not sleep well at all last night  Attempted to eat some breakfast and become very nauseous  States she just wants to rest  Denies blurred vision or headache  Objective: Resting with eyes closed in dark room in bed   No neurofocal deficits  I/O       12/17 0701 - 12/18 0700 12/18 0701 - 12/19 0700 12/19 0701 - 12/20 0700    P  O   50 120    I V  (mL/kg)  2645 1 (43 2)     IV Piggyback  250     Total Intake(mL/kg)  2945 1 (48 1) 120 (2)    Urine (mL/kg/hr)  1000 (0 7) 175 (0 4)    Emesis/NG output  150     Stool   0    Blood  10     Total Output  1160 175    Net  +1785 1 -55           Unmeasured Urine Occurrence  3 x 1 x    Unmeasured Emesis Occurrence  1 x           Invasive Devices     Peripheral Intravenous Line            Peripheral IV 12/18/19 Right;Dorsal (posterior) Hand 1 day                Physical Exam:  Vitals: Blood pressure 122/73, pulse 65, temperature 98 5 °F (36 9 °C), temperature source Oral, resp  rate 16, height 5' 3" (1 6 m), weight 61 2 kg (135 lb), SpO2 99 %, not currently breastfeeding  ,Body mass index is 23 91 kg/m²        General appearance: alert, appears stated age, cooperative and no distress  Head: Normocephalic, without obvious abnormality, right shunt incision area   Eyes: EOMI, PERRL, pupils 3 mm bilaterally  Neck: supple, symmetrical, trachea midline and NT, right neck incision dressing clean and dry  Back: no kyphosis present, no tenderness to percussion or palpation  Lungs: non labored breathing  Heart: regular heart rate  Neurologic:   Mental status: Alert, oriented x3, thought content appropriate  Cranial nerves: grossly intact (Cranial nerves II-XII)  Sensory: normal to light touch  Motor: moving all extremities without focal weakness, strength 5/5 throughout  Reflexes: 2+ and symmetric, no Law's or clonus  Coordination: finger to nose normal bilaterally, no drift bilaterally      Lab Results:  Results from last 7 days   Lab Units 12/19/19  0506 12/19/19  0203 12/18/19  1837 12/16/19  1345   WBC Thousand/uL 10 71* 10 10  --  4 69   HEMOGLOBIN g/dL 9 8* 10 6*  --  10 9*   HEMATOCRIT % 33 4* 35 6  --  37 3   PLATELETS Thousands/uL 93* 137* 127* 133*   NEUTROS PCT %  --   --   --  63   MONOS PCT %  -- --   --  7   MONO PCT % 4  --   --   --      Results from last 7 days   Lab Units 12/19/19  0506 12/19/19  0203 12/16/19  1345   POTASSIUM mmol/L 4 5 4 4 4 2   CHLORIDE mmol/L 105 106 108   CO2 mmol/L 27 27 30   BUN mg/dL 11 12 15   CREATININE mg/dL 0 69 0 75 0 63   CALCIUM mg/dL 9 0 9 2 9 1   ALK PHOS U/L  --   --  58   ALT U/L  --   --  13   AST U/L  --   --  9     Results from last 7 days   Lab Units 12/19/19  0506   MAGNESIUM mg/dL 1 7     Results from last 7 days   Lab Units 12/19/19  0506   PHOSPHORUS mg/dL 3 1     Results from last 7 days   Lab Units 12/19/19  0506 12/16/19  1345   INR  1 08 1 04   PTT seconds 24 25     No results found for: TROPONINT  ABG:  Lab Results   Component Value Date    PHART 7 484 (H) 01/25/2018    BVR5PKZ 33 6 (L) 01/25/2018    PO2ART 165 1 (H) 01/25/2018    MLO4FST 24 7 01/25/2018    BEART 1 6 01/25/2018    SOURCE Line, Arterial 01/25/2018       Imaging Studies: I have personally reviewed pertinent reports  and I have personally reviewed pertinent films in PACS    EKG, Pathology, and Other Studies: I have personally reviewed pertinent reports        VTE Pharmacologic Prophylaxis: Sequential compression device (Venodyne)  and Heparin    VTE Mechanical Prophylaxis: sequential compression device

## 2019-12-19 NOTE — PROGRESS NOTES
[facility] Unit Transfer Note  Unit/Bed # @DBLINK ([id number],25926)@ Encounter: [id number]  SOD Team C           Cynthia Bullion 38 y o  female [id number]      C/Samantha Bradley 1106 Problems: Principal Problem:    Pseudotumor cerebri  Active Problems:    Gastroesophageal reflux disease    Postgastrectomy malabsorption    Papilledema    1  Pseudotumor cerebri status post image guided insertion of right coronal ventricular atrial shunt  · Postoperative Day 0  · CSF studies demonstrating  Elevated WBC at 42,  Elevated  RBC  24,000,  CSF glucose 71,  CSF protein 43  · Chest x-ray 12/18/2019 demonstrating to the ventricular atrial catheter appearing to be in the SVC, superior to the right atrium  · Continue vancomycin 1000 mg Q 12  as scheduled with last dose  for 3:00 a m  12/19/2019  · Follow up CSF culture and Gram stain  · Continue seizure precautions  · Frequent neuro checks every 2 hours  · Out of bed as tolerated  · Continue local wound care  · Continue half normal saline at 100ml/hr  · Continue spironolactone  12 5 mg which was recently started as an outpatient  · Will need PT/OT  · Pain Regimen : Acetaminophen 650 mg Q 4, Oxycodone 5 mg Q 4 p r n  Moderate pain, Oxycodone 10 mg Q 4 p r n  Severe pain, Dilaudid 0 5 mg q 1 hour p r n  Breakthrough pain  · Continue bowel regimen  · Neurosurgery consult placed  2  Papilledema  · Patient unable to tolerate Diamox due to kidney stones in the past  · Other ocular history includes strabismus surgery on both eyes for amblyopia as a child  · Current continues to have dipolopia on exam with strabismus  · Monitor eye exam  · Needs outpatient follow up    3  Post-operative Vomiting   · Patient had an episode of vomiting postoperatively x 2 with associated nausea    · A repeat CT head was performed revealed normal ventricular size, small subdural hematoma along the right frontal lobe and posterior right falx that is likely postoperative  · Vomiting and nausea initially had improved with Zofran but with repeat episode of vomiting Reglan was given  · Continue as needed Reglan for nausea and vomitign  · Continue to monitor  · Continue IV fluids patient has not been eating well postoperatively  · Continue as needed Zofran for nausea    4  GERD  · Patient has GERD with hiatal hernia   · Patient received IV Protonix however we will continue home pantoprazole  · Continue home sucralfate    5  Post gastrectomy malabsorption  · Patient is status post laparoscopic vertical sleeve gastrectomy  · Continue home vitamins and supplements      VTE Pharmacologic Prophylaxis: Reason for no pharmacologic prophylaxis Postop and with small subdural hemorrhage  VTE Mechanical Prophylaxis: sequential compression device    Disposition: Patient requires Level 2 Step Down     Hospital Course: The patient is a 16-year-old female with past medical history significant for pseudotumor cerebri 1st diagnosed in September 2015 status post laparoscopic-assisted lumbar peritoneal shunt that was revised to a  shunt in May 2017, history of laparoscopic vertical sleeve gastrectomy in December 2016 after which she underwent an exploratory laparotomy for gastric perforation in January 2018 secondary to injury to the gastric sleeve during attempted hiatal hernia repair, 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, history of gastroesophageal stent placement, history of GERD who has been admitted to the hospital for image guided insertion of right coronal ventricular atrial shunt  Patient is postoperative day 0  Postoperatively there were no immediate complications and patient was transferred to the ICU for closer neurological monitoring  In the ICU patient started to have some postoperative vomiting x1  Her vital signs remained stable and decision was made to obtain a CT head that revealed a small subdural hemorrhage that is likely postoperative    Patient's neurological exam remained intact with no focal neurological deficits, no diplopia on my examination, and alert and oriented x3  Of note patient does not accept blood products  Patient's  Cesar Reynaga is at bedside and updated about transfer out of the ICU  Subjective:  Patient is feeling tired and nauseous  She denies any headache at this time or blurry vision  She denies any abdominal discomfort or pain  Patient is urinating without any difficulties at this time  Patient does not feel very hungry and does not want to eat  Objective:  Patient is resting comfortably in bed  Does not appear to be apparent distress  Vitals:    12/18/19 2000 12/18/19 2100 12/18/19 2142 12/18/19 2200   BP: 132/74 116/66  102/54   BP Location: Right arm      Pulse: 64 56 80 60   Resp: 14 20 20 (!) 11   Temp: 98 1 °F (36 7 °C)      TempSrc: Oral      SpO2: 99% 99% 100% 97%   Weight:       Height:         I/O last 24 hours: In: 2233 4 [P O :50; I V :1933 4; IV Piggyback:250]  Out: 160 [Urine:150; Blood:10]      Physical Exam   Constitutional: She is oriented to person, place, and time  She appears well-developed and well-nourished  No distress  HENT:   Head: Hair is abnormal    Mouth/Throat: No oropharyngeal exudate  Right cranials surgical incision at suture in place  Clean dry and intact   Eyes: Pupils are equal, round, and reactive to light  Conjunctivae and EOM are normal    Pupils 2 mm bilaterally     Neck: Neck supple  Cardiovascular: Normal rate, regular rhythm and normal heart sounds  Exam reveals no friction rub  No murmur heard  Pulmonary/Chest: Effort normal and breath sounds normal  No respiratory distress  Abdominal: Soft  Bowel sounds are normal  She exhibits no distension  Musculoskeletal: She exhibits no edema or tenderness  Neurological: She is oriented to person, place, and time  No cranial nerve deficit  Coordination normal    Patient is fatigued and minimally talkative but answers questions appropriately    Cranial nerves 2-12 grossly intact,    Sensation intact throughout,    Strength  Testing 5/5 upper and lower extremities bilaterally   Skin: Skin is warm and dry  No erythema  Psychiatric: She has a normal mood and affect   Her behavior is normal          Aries Dawson MD

## 2019-12-19 NOTE — SOCIAL WORK
CM met with patient and explained CM role  Patient lives with her son in a 2 floor 1 st floor setup and 1 channing home  Patient independent, no DME and drives  Pt uses [facility] in [place]  Patient interested in meds to bed  POA- none, PCP Dr Minnie Birmingham  [facility]  Pt denies IP tx for MH and drugs/etoh  CM reviewed d/c planning process including the following: identifying help at home, patient preference for d/c planning needs, Discharge Lounge, [facility] Meds to Bed program, availability of treatment team to discuss questions or concerns patient and/or family may have regarding understanding medications and recognizing signs and symptoms once discharged  CM also encouraged patient to follow up with all recommended appointments after discharge  Patient advised of importance for patient and family to participate in managing patients medical well being

## 2019-12-19 NOTE — ASSESSMENT & PLAN NOTE
POD#1 image guided insertion of right coronal ventricular-atrial shunt (right)  Assessment:  · Feeling nauseated this morning  Denies headache  States head pressure has improved since surgery  Denies any chest pain or shortness of breath  · No neuro-focal deficits on exam   · Right side cranial insertion site clean, dry and intact  Right neck site incision clean and dry  Imaging:  · CT head 12/18/19:  Status post right frontal  shunt placement with tip over the 3rd ventricle  Ventricles are normal in size  Small subdural hematomas along the right frontal lobe and posterior right falx are likely postoperative the clinical correlation is recommended  · CXR 12/18/19:  Tip of ventricular atrial catheter appears to be in the SVC, superior to the right atrium  Small amount of right basilar pleural fluid or thickening  Plan:  · Frequent neuro checks  · STAT CT head if decrease in GCS > 2 pts in one hour  · Continue spirinolactone 12 5 mg daily  · Repeat CT head today pending  · Post-op pain well-controlled  · Mobilize with PT/OT  · DVT ppx: SCDs and HSC  · Neurosurgery will continue to follow as primary  Please call with any questions or concerns

## 2019-12-19 NOTE — ASSESSMENT & PLAN NOTE
POD3 image guided insertion of right coronal ventricular-atrial shunt (right)  Assessment:  · Feeling nauseated this morning admits to vomiting  Denies headache  States head pressure has improved since surgery  Denies any chest pain or shortness of breath  · No neuro-focal deficits on exam   · Right side cranial incision and right neck site incision well approximated     Imaging:  · CT head 12/18/19:  Status post right frontal  shunt placement with tip over the 3rd ventricle  Ventricles are normal in size  Small subdural hematomas along the right frontal lobe and posterior right falx are likely postoperative the clinical correlation is recommended  · CXR 12/18/19:  Tip of ventricular atrial catheter appears to be in the SVC, superior to the right atrium  Small amount of right basilar pleural fluid or thickening  Plan:  · Frequent neuro checks  · STAT CT head if decrease in GCS > 2 pts in one hour  · Continue spirinolactone 12 5 mg daily  · Post-op pain well-controlled  Complains of numbness around incision sites and vomiting  · CSF fluid sent:  · RBC 24,000, WBC 42, Protein 43, Glucose 71, Cx and gram stain NTD  · Mobilize with PT/OT  · DVT ppx: SCDs and [address]  · Neurosurgery will continue to follow as primary  Please call with any questions or concerns

## 2019-12-19 NOTE — SOCIAL WORK
Patient reviewed during care coordination rounds  PT/OT evaluation pending for discharge planning  CM will continue to follow

## 2019-12-19 NOTE — PHYSICAL THERAPY NOTE
Physical Therapy Evaluation    Patient Name: Vidhi Alcala    LCXKT'D Date: 12/19/2019     Problem List  Principal Problem:    Pseudotumor cerebri  Active Problems:    Gastroesophageal reflux disease    Postgastrectomy malabsorption    Papilledema       Past Medical History  Past Medical History:   Diagnosis Date    Abdominal pain     Brain condition     Pseudotumor Cerebri     Chronic kidney disease     De Quervain's disease (tenosynovitis)     Depression 1/10/2019    Esophageal perforation     Gastric leak     GERD (gastroesophageal reflux disease)     Idiopathic intracranial hypertension     Kidney stone     Migraine     No blood products     per pt: personal and Jehovah's witness beliefs  surgeon office aware 12/13/19    Obesity     Papilledema, both eyes     Postgastrectomy malabsorption     Presence of lumboperitoneal shunt     Resolved: Sep 20, 2017    Rotator cuff tendinitis     Resolved: Aug 23, 2017    Visual field defect         Past Surgical History  Past Surgical History:   Procedure Laterality Date    CSF SHUNT      LP shunt - 2015 -  shunt - 2017    ESOPHAGOGASTRODUODENOSCOPY N/A 2/23/2018    Procedure: ESOPHAGOGASTRODUODENOSCOPY (EGD) WITH ESOPHAGEAL STENT PLACEMENT;  Surgeon: Cierra Heller MD;  [facility];  Service: Thoracic    ESOPHAGOGASTRODUODENOSCOPY N/A 2/23/2018    Procedure: ESOPHAGOGASTRODUODENOSCOPY (EGD) WITH REMOVAL ESOPHAGEAL STENT  AND REPLACEMENT WITH 23mm X155mm [address];  Surgeon: Cierra Heller MD;  [facility];  Service: Thoracic    ESOPHAGOGASTRODUODENOSCOPY N/A 3/28/2018    Procedure: ESOPHAGOGASTRODUODENOSCOPY (EGD) with PEJ placement ;  Surgeon: Rodney Monique MD;  [facility];   Service: Gastroenterology    ESOPHAGOGASTRODUODENOSCOPY N/A 4/5/2018    Procedure: ESOPHAGOGASTRODUODENOSCOPY (EGD) with botox injection and kaofed placement;  Surgeon: Charla Paulino DO Rad;  [facility]; Service: Gastroenterology    ESOPHAGOGASTRODUODENOSCOPY N/A 4/10/2018    Procedure: ESOPHAGOGASTRODUODENOSCOPY (EGD) with Kaofed placement;  Surgeon: Erwin Michelle MD;  [facility]; Service: Gastroenterology    ESOPHAGOSCOPY WITH STENT INSERTION N/A 1/24/2018    Procedure: INSERTION STENT ESOPHAGEAL;  Surgeon: Neno Lopez MD;  [facility]; Service: Gastroenterology    EYE SURGERY Right 1980    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)     GASTRIC BYPASS  12/19/2016    Lap sleeve gastrectomy w/shunt length shortening procedure    HERNIA REPAIR      HYSTERECTOMY  03/14/2013    IR LUMBAR PUNCTURE  8/29/2018    LAPAROTOMY N/A 1/25/2018    Procedure: Exploratory Laparotomy, wash out,placement of drains, placement of NG feeding tube ; Surgeon: Erwin Moore DO;  [facility];  Service: General    LUMBAR PERITONEAL SHUNT  11/19/2015    Laparoscopic assisted    FL CREATE SHUNT:VENTRIC-ATRIAL Right 12/18/2019    Procedure: IMAGE GUIDED INSERTION OF RIGHT CORONAL VENTRICULAR-ATRIAL SHUNT;  Surgeon: Rain Long MD;  [facility];  Service: Neurosurgery    FL CREATE SHUNT:VENTRIC-PERITONEAL Right 5/31/2017    Procedure: IMAGE GUIDED CORONAL PLACEMENT OF PROGRAMABLE VENTRICULAR-PERITONEAL SHUNT, REMOVAL OF LP SHUNT ;  Surgeon: Rian Long MD;  [facility];  Service: Neurosurgery    FL EGD TRANSORAL BIOPSY SINGLE/MULTIPLE N/A 6/27/2018    Procedure: ESOPHAGOGASTRODUODENOSCOPY (EGD) with padlock clip placement;  Surgeon: Ina Gray MD;  [facility];   Service: Anna Pastel CSF SHUNT,W/O REPLACE Right 2/5/2018    Procedure: Removal of  shunt;  Surgeon: Rain Long MD;  [facility];  Service: Neurosurgery    FL REPLACEMENT/REVISION,CSF SHUNT Right 1/25/2018    Procedure: Externalization of right-sided SHUNT VENTRICULAR-PERITONEAL in anterior chest wall ribs two and three level  ;  Surgeon: Dolly De Luna MD;  [facility];  Service: Neurosurgery    WRIST SURGERY Right     x3 2006, 2008 12/19/19 1257   Note Type   Note type Eval only   Pain Assessment   Pain Assessment 0-10   Pain Score 7   Pain Type Acute pain   Pain Location [facility]   Hospital Pain Intervention(s) Repositioned; Ambulation/increased activity; Emotional support   Response to Interventions tolerated   Home Living   Type of [address] Two level;Stairs to enter with rails; Performs ADLs on one level; Able to live on main level with bedroom/bathroom  (1 KAVITA; first floor set up)   Bathroom Equipment   (none use PTA)   Home Equipment   (denies use PTA)   Additional Comments Pt resides with son in a [facility] with 1 KAVITA and first floor set up   Prior Function   Level of [place] Independent with ADLs and functional mobility   Lives With Son   ADL Assistance Independent   IADLs Independent   Falls in the last 6 months 0   Restrictions/Precautions   Weight Bearing Precautions Per Order No   Other Precautions Pain   Cognition   Arousal/Participation Cooperative   Orientation Level Oriented X4   Memory Within functional limits   Following Commands Follows all commands and directions without difficulty   Comments Disinterested with therapy  Flat affect; C/O blurried vision   RLE Assessment   RLE Assessment WFL  (based on functional observation)   LLE Assessment   LLE Assessment WFL  (based on functional observation)   Bed Mobility   Supine to Sit 6  Modified independent   Sit to Supine 6  Modified independent   Additional Comments Increased time and effort to complete  Completed with HOB elevated   Transfers   Sit to Stand 7  Independent   Stand to Sit 7  Independent   Toilet transfer 7  Independent   Additional items Standard toilet   Additional Comments Performed without AD   Ambulation/Elevation   Gait pattern Foward flexed; Excessively slow; Short stride   Gait Assistance 7  Independent   Assistive Device   (none)   Distance 20 feet within room   Balance   Static Sitting Good   Dynamic Sitting Fair +   Static Standing Fair   Dynamic Standing Fair   Ambulatory Fair   Activity Tolerance   Activity Tolerance Patient limited by fatigue;Patient limited by pain   Medical Staff Made Aware OT   Nurse Made Aware Yes   Assessment   Prognosis Excellent   Problem List Impaired balance; Impaired vision   Assessment Pt is 38 y o  female seen for moderate complexity PT evaluation s/p admission to [facility] on 12/19/19 for a planned "Image guided insertion of Right Coronal Ventricular-Atrial Shunt" after presenting to outpatient Neurosurgery Office with headache and visual changes and dx'd with pseudotumor cerebri and papilledema  Comorbidities affecting pt's physical performance at time of assessment include: hiatal hernia, h/o renal stones, post-op vomiting, GERD, and post gastrectomy malabsorption  PTA, Pt independent with ADLs, IADLs, and functional mobility without AD  Pt resides in a [facility] with 1 KAVITA and first floor set up  Pt is currently at mod I to independent level for bed mobility, transfers, and ambulation  Increased time required for bed mobility 2* pain  Pt c/o blurred vision; no observed impairments of balance due to vision changes  Patient's decreased mobility level and increased fall risk is secondary to deficits in activity tolerance and higher level balance  Current clinical presentation is evolving seen in pt's presentation of ongoing medical management, decreased activity tolerance, and significant PMH  Pt is functioning close to prior baseline and no longer requires inpatient PT needs  Please re-consult if medically appropriate  Educated on home safety, energy conservation, and inpatient PT discharge needs  Encouraged ambulation 3-4/x day  Discharge recommendation is home with family support      Goals   Patient Goals To not sit in the chair   Plan   PT Frequency   (D/C inpatient OT)   Recommendation Recommendation Home with family support   PT - OK to Discharge Yes   Modified Sendy Scale   Modified [place] Scale 2   Barthel Index   Feeding 10   Bathing 0   Grooming Score 5   Dressing Score 10   Bladder Score 10   Bowels Score 10   Toilet Use Score 10   Transfers (Bed/Chair) Score 15   Mobility (Level Surface) Score 10   Stairs Score 0  (Not performed)   Barthel Index Score 80       Refugio Talavera PT, DPT

## 2019-12-19 NOTE — UTILIZATION REVIEW
Initial Clinical Review    Elective Inpatient surgical procedure  Age/Sex: 38 y o  female  Surgery Date: 12/18  Procedure: S/P IMAGE GUIDED INSERTION OF RIGHT CORONAL VENTRICULAR-ATRIAL SHUNT (Right)  Anesthesia: General    Admission Orders: Date/Time/Statement: Inpatient Admission Orders (From admission, onward)     Ordered        12/18/19 1453  Inpatient Admission  Once                   Orders Placed This Encounter   Procedures    Inpatient Admission     Standing Status:   Standing     Number of Occurrences:   1     Order Specific Question:   Admitting Physician     Answer: Zev Garcia [[id number]]     Order Specific Question:   Level of Care     Answer:   Critical Care [15]     Order Specific Question:   Estimated length of stay     Answer:   More than 2 Midnights     Order Specific Question:   Certification     Answer:   I certify that inpatient services are medically necessary for this patient for a duration of greater than two midnights  See H&P and MD Progress Notes for additional information about the patient's course of treatment       Vital Signs: /61 (BP Location: Right arm)   Pulse 64   Temp (!) 97 3 °F (36 3 °C) (Oral)   Resp 16   Ht 5' 3" (1 6 m)   Wt 61 2 kg (135 lb)   LMP  (LMP Unknown)   SpO2 100%   BMI 23 91 kg/m²      Diet: Regular  Mobility: OOB  DVT Prophylaxis: Sequential compression device    Medications/Pain Control:   Scheduled Medications:  Medications:  calcium carbonate 2 tablet Oral Daily With Breakfast   cyanocobalamin 100 mcg Oral Daily   docusate sodium 100 mg Oral BID   heparin (porcine) 5,000 Units Subcutaneous Q8H [address]   pantoprazole 40 mg Oral Early Morning   spironolactone 12 5 mg Oral Daily   sucralfate 1,000 mg Oral 4x Daily   thiamine 100 mg Oral Daily     Continuous IV Infusions:  sodium chloride 100 mL/hr Intravenous Continuous     PRN Meds:  acetaminophen 650 mg Oral Q4H PRN   bisacodyl 10 mg Rectal Daily PRN   HYDROmorphone 0/5 mg Intravenous Q1H PRN 12/18 x2 HYDROmorphone 0 5 mg Intravenous Q4H PRN 12/19 x2,    metoclopramide 10 mg Intravenous Q6H PRN   oxyCODONE 5 mg Oral Q4H PRN 12/18 x1     Network Utilization Review Department  [email or web address]  ATTENTION: Please call with any questions or concerns to [phone] and carefully listen to the prompts so that you are directed to the right person  All voicemails are confidential   Miesha Frank all requests for admission clinical reviews, approved or denied determinations and any other requests to dedicated fax number below belonging to the campus where the patient is receiving treatment   List of dedicated fax numbers for the Facilities:  [address] DENIALS (Administrative/Medical Necessity) [phone]   [address] (Maternity/NICU/Pediatrics) [phone]   Lv Needs [phone]   Madeline Boyd [phone]   Ruben Dailey [phone]   Daiana Settle [phone]   1205 [facility] 15[address] [facility] [phone]   [facility] [phone]   2202 [facility]  2401 [facility] 1000 [address] [facility] [phone]

## 2019-12-20 LAB
ANION GAP SERPL CALCULATED.3IONS-SCNC: 3 MMOL/L (ref 4–13)
BASOPHILS # BLD AUTO: 0.04 THOUSANDS/ΜL (ref 0–0.1)
BASOPHILS NFR BLD AUTO: 1 % (ref 0–1)
BUN SERPL-MCNC: 14 MG/DL (ref 5–25)
CALCIUM SERPL-MCNC: 8.6 MG/DL (ref 8.3–10.1)
CHLORIDE SERPL-SCNC: 110 MMOL/L (ref 100–108)
CO2 SERPL-SCNC: 31 MMOL/L (ref 21–32)
CREAT SERPL-MCNC: 0.62 MG/DL (ref 0.6–1.3)
EOSINOPHIL # BLD AUTO: 0.05 THOUSAND/ΜL (ref 0–0.61)
EOSINOPHIL NFR BLD AUTO: 1 % (ref 0–6)
ERYTHROCYTE [DISTWIDTH] IN BLOOD BY AUTOMATED COUNT: 15.7 % (ref 11.6–15.1)
GFR SERPL CREATININE-BSD FRML MDRD: 109 ML/MIN/1.73SQ M
GLUCOSE SERPL-MCNC: 87 MG/DL (ref 65–140)
HCT VFR BLD AUTO: 31.3 % (ref 34.8–46.1)
HGB BLD-MCNC: 9.3 G/DL (ref 11.5–15.4)
IMM GRANULOCYTES # BLD AUTO: 0.01 THOUSAND/UL (ref 0–0.2)
IMM GRANULOCYTES NFR BLD AUTO: 0 % (ref 0–2)
LYMPHOCYTES # BLD AUTO: 1.63 THOUSANDS/ΜL (ref 0.6–4.47)
LYMPHOCYTES NFR BLD AUTO: 29 % (ref 14–44)
MCH RBC QN AUTO: 25 PG (ref 26.8–34.3)
MCHC RBC AUTO-ENTMCNC: 29.7 G/DL (ref 31.4–37.4)
MCV RBC AUTO: 84 FL (ref 82–98)
MONOCYTES # BLD AUTO: 0.47 THOUSAND/ΜL (ref 0.17–1.22)
MONOCYTES NFR BLD AUTO: 8 % (ref 4–12)
NEUTROPHILS # BLD AUTO: 3.48 THOUSANDS/ΜL (ref 1.85–7.62)
NEUTS SEG NFR BLD AUTO: 61 % (ref 43–75)
NRBC BLD AUTO-RTO: 0 /100 WBCS
PLATELET # BLD AUTO: 130 THOUSANDS/UL (ref 149–390)
PMV BLD AUTO: 11.2 FL (ref 8.9–12.7)
POTASSIUM SERPL-SCNC: 3.6 MMOL/L (ref 3.5–5.3)
RBC # BLD AUTO: 3.72 MILLION/UL (ref 3.81–5.12)
SODIUM SERPL-SCNC: 144 MMOL/L (ref 136–145)
WBC # BLD AUTO: 5.68 THOUSAND/UL (ref 4.31–10.16)

## 2019-12-20 PROCEDURE — 80048 BASIC METABOLIC PNL TOTAL CA: CPT | Performed by: STUDENT IN AN ORGANIZED HEALTH CARE EDUCATION/TRAINING PROGRAM

## 2019-12-20 PROCEDURE — 99024 POSTOP FOLLOW-UP VISIT: CPT | Performed by: NEUROLOGICAL SURGERY

## 2019-12-20 PROCEDURE — 85025 COMPLETE CBC W/AUTO DIFF WBC: CPT | Performed by: STUDENT IN AN ORGANIZED HEALTH CARE EDUCATION/TRAINING PROGRAM

## 2019-12-20 RX ORDER — HYDROMORPHONE HYDROCHLORIDE 2 MG/1
1 TABLET ORAL EVERY 4 HOURS PRN
Status: DISCONTINUED | OUTPATIENT
Start: 2019-12-20 | End: 2019-12-22 | Stop reason: HOSPADM

## 2019-12-20 RX ORDER — HYDROMORPHONE HYDROCHLORIDE 2 MG/1
2 TABLET ORAL EVERY 4 HOURS PRN
Status: DISCONTINUED | OUTPATIENT
Start: 2019-12-20 | End: 2019-12-22 | Stop reason: HOSPADM

## 2019-12-20 RX ORDER — HYDROMORPHONE HYDROCHLORIDE 2 MG/1
2 TABLET ORAL EVERY 4 HOURS PRN
Status: DISCONTINUED | OUTPATIENT
Start: 2019-12-20 | End: 2019-12-20

## 2019-12-20 RX ADMIN — HEPARIN SODIUM 5000 UNITS: 5000 INJECTION INTRAVENOUS; SUBCUTANEOUS at 21:02

## 2019-12-20 RX ADMIN — DOCUSATE SODIUM 100 MG: 100 CAPSULE, LIQUID FILLED ORAL at 17:29

## 2019-12-20 RX ADMIN — HYDROMORPHONE HYDROCHLORIDE 0.5 MG: 1 INJECTION, SOLUTION INTRAMUSCULAR; INTRAVENOUS; SUBCUTANEOUS at 05:27

## 2019-12-20 RX ADMIN — ACETAMINOPHEN 650 MG: 325 TABLET ORAL at 15:33

## 2019-12-20 RX ADMIN — HEPARIN SODIUM 5000 UNITS: 5000 INJECTION INTRAVENOUS; SUBCUTANEOUS at 15:33

## 2019-12-20 RX ADMIN — HYDROMORPHONE HYDROCHLORIDE 2 MG: 2 TABLET ORAL at 20:06

## 2019-12-20 RX ADMIN — METOCLOPRAMIDE 10 MG: 5 INJECTION, SOLUTION INTRAMUSCULAR; INTRAVENOUS at 19:27

## 2019-12-20 RX ADMIN — HYDROMORPHONE HYDROCHLORIDE 2 MG: 2 TABLET ORAL at 15:36

## 2019-12-20 RX ADMIN — METOCLOPRAMIDE 10 MG: 5 INJECTION, SOLUTION INTRAMUSCULAR; INTRAVENOUS at 08:51

## 2019-12-20 RX ADMIN — DOCUSATE SODIUM 100 MG: 100 CAPSULE, LIQUID FILLED ORAL at 08:43

## 2019-12-20 RX ADMIN — PANTOPRAZOLE SODIUM 40 MG: 40 TABLET, DELAYED RELEASE ORAL at 05:22

## 2019-12-20 RX ADMIN — SUCRALFATE 1000 MG: 1 SUSPENSION ORAL at 17:29

## 2019-12-20 RX ADMIN — HEPARIN SODIUM 5000 UNITS: 5000 INJECTION INTRAVENOUS; SUBCUTANEOUS at 05:21

## 2019-12-20 NOTE — PROGRESS NOTES
Patient seen and examined    She complains of heartburn and nausea  These are longstanding problems however she feels that by getting back onto her home medications she can solve this problem  Her incisions are clean and dry without erythema or edema    She is ambulating in the halls    Plan:  Her significant other is to bring in her home medications which she uses to control her nausea and may be effective and bring her back to her baseline

## 2019-12-20 NOTE — SOCIAL WORK
Cm reviewed patient during care coordination rounds  Patient is not stable for dc today  Cm informed that patient is cleared from therapy to dc home  Anticipated dc home tomorrow

## 2019-12-21 LAB — BACTERIA CSF CULT: NO GROWTH

## 2019-12-21 PROCEDURE — 99024 POSTOP FOLLOW-UP VISIT: CPT | Performed by: PHYSICIAN ASSISTANT

## 2019-12-21 RX ORDER — OMEPRAZOLE 20 MG/1
40 CAPSULE, DELAYED RELEASE ORAL
Status: DISCONTINUED | OUTPATIENT
Start: 2019-12-21 | End: 2019-12-21

## 2019-12-21 RX ADMIN — DOCUSATE SODIUM 100 MG: 100 CAPSULE, LIQUID FILLED ORAL at 09:38

## 2019-12-21 RX ADMIN — HEPARIN SODIUM 5000 UNITS: 5000 INJECTION INTRAVENOUS; SUBCUTANEOUS at 05:58

## 2019-12-21 RX ADMIN — DOCUSATE SODIUM 100 MG: 100 CAPSULE, LIQUID FILLED ORAL at 17:32

## 2019-12-21 RX ADMIN — HEPARIN SODIUM 5000 UNITS: 5000 INJECTION INTRAVENOUS; SUBCUTANEOUS at 13:40

## 2019-12-21 RX ADMIN — HYDROMORPHONE HYDROCHLORIDE 1 MG: 2 TABLET ORAL at 18:45

## 2019-12-21 RX ADMIN — METOCLOPRAMIDE 10 MG: 5 INJECTION, SOLUTION INTRAMUSCULAR; INTRAVENOUS at 13:42

## 2019-12-21 RX ADMIN — SUCRALFATE 1000 MG: 1 SUSPENSION ORAL at 17:32

## 2019-12-21 RX ADMIN — OMEPRAZOLE MAGNESIUM 40 MG: 20 CAPSULE, DELAYED RELEASE ORAL at 17:42

## 2019-12-21 RX ADMIN — HYDROMORPHONE HYDROCHLORIDE 2 MG: 2 TABLET ORAL at 09:41

## 2019-12-21 NOTE — PROGRESS NOTES
Progress Note - Luz Ma 1975, 38 y o  female MRN: [id number]    Unit/Bed#: [facility] 901-23 Encounter: [id number]    Primary Care Provider: Castro Santana DO   Date and time admitted to hospital: 12/18/2019  5:48 AM        * Pseudotumor cerebri  Assessment & Plan  POD3 image guided insertion of right coronal ventricular-atrial shunt (right)  Assessment:  · Feeling nauseated this morning admits to vomiting  Denies headache  States head pressure has improved since surgery  Denies any chest pain or shortness of breath  · No neuro-focal deficits on exam   · Right side cranial incision and right neck site incision well approximated     Imaging:  · CT head 12/18/19:  Status post right frontal  shunt placement with tip over the 3rd ventricle  Ventricles are normal in size  Small subdural hematomas along the right frontal lobe and posterior right falx are likely postoperative the clinical correlation is recommended  · CXR 12/18/19:  Tip of ventricular atrial catheter appears to be in the SVC, superior to the right atrium  Small amount of right basilar pleural fluid or thickening  Plan:  · Frequent neuro checks  · STAT CT head if decrease in GCS > 2 pts in one hour  · Continue spirinolactone 12 5 mg daily  · Post-op pain well-controlled  Complains of numbness around incision sites and vomiting  · CSF fluid sent:  · RBC 24,000, WBC 42, Protein 43, Glucose 71, Cx and gram stain NTD  · Mobilize with PT/OT  · DVT ppx: SCDs and [address]  · Neurosurgery will continue to follow as primary  Please call with any questions or concerns  Papilledema  Assessment & Plan  2 diopter papilledema OD  2 + diopter papilledema OS    Postgastrectomy malabsorption  Assessment & Plan  · S/p failed sleeve gastrectomy at [facility] January 2018 with perforation of the gastric sleeve and 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      Gastroesophageal reflux disease  Assessment & Plan  · Continue home protonix  · Patient refusing her Carafate at this time         Subjective/Objective   Chief Complaint: "I'm not feeling great"    Subjective: patient states she is having heaviness of her right ear "feeling like it isn't attached", she also admits to numbness surrounding her incision sites  She states this morning she vomited her breakfast  Patient states since her surgery she has been having vision disturbance stating that she has very large font on her phone and she can't see it anymore  She denies any headaches, dizziness, chest pain, SOB  Objective: laying in bed    I/O       12/19 0701 - 12/20 0700 12/20 0701 - 12/21 0700 12/21 0701 - 12/22 0700    P  O  520 360 200    I V  (mL/kg)       IV Piggyback       Total Intake(mL/kg) 520 (7 5) 360 (5 2) 200 (2 9)    Urine (mL/kg/hr) 975 (0 6) 650 (0 4)     Emesis/NG output  0     Stool 0      Blood       Total Output 975 650     Net -455 -290 +200           Unmeasured Urine Occurrence 2 x 1 x 1 x    Unmeasured Emesis Occurrence  1 x           Invasive Devices     Peripheral Intravenous Line            Peripheral IV 12/18/19 Right;Dorsal (posterior) Hand 3 days                Physical Exam:  Vitals: Blood pressure 101/59, pulse 59, temperature 98 °F (36 7 °C), resp  rate 20, height 5' 3" (1 6 m), weight 68 9 kg (151 lb 14 4 oz), SpO2 99 %, not currently breastfeeding  ,Body mass index is 26 91 kg/m²  General appearance: alert, appears stated age, cooperative and no distress  Head: Normocephalic, right frontal incision well approximated with sutures, right lateral neck incision well approximated with sutures  No erythema or edema  Eyes: EOMI, PERRL  Lungs: non labored breathing  Heart: regular heart rate  Neurologic:   Mental status: Alert, oriented x3, thought content appropriate  Speech is fluent, clear  Able to repeat a sentence, able to identify 3/3 objects correctly  Cranial nerves: grossly intact (Cranial nerves II-XII)   Facial symmetry at rest and with expression  Tongue is midline  Sensory: normal to LT in the face, bilateral upper and lower extremitie  Motor: moving all extremities without focal weakness, strength 5/5  Reflexes: negative marcus, negative clonus  Coordination: finger to nose normal bilaterally, no drift bilaterally      Lab Results:  Results from last 7 days   Lab Units 12/20/19  0449 12/19/19  0506 12/19/19  0203  12/16/19  1345   WBC Thousand/uL 5 68 10 71* 10 10  --  4 69   HEMOGLOBIN g/dL 9 3* 9 8* 10 6*  --  10 9*   HEMATOCRIT % 31 3* 33 4* 35 6  --  37 3   PLATELETS Thousands/uL 130* 93* 137*   < > 133*   NEUTROS PCT % 61  --   --   --  63   MONOS PCT % 8  --   --   --  7   MONO PCT %  --  4  --   --   --     < > = values in this interval not displayed  Results from last 7 days   Lab Units 12/20/19  0449 12/19/19  0506 12/19/19  0203 12/16/19  1345   POTASSIUM mmol/L 3 6 4 5 4 4 4 2   CHLORIDE mmol/L 110* 105 106 108   CO2 mmol/L 31 27 27 30   BUN mg/dL 14 11 12 15   CREATININE mg/dL 0 62 0 69 0 75 0 63   CALCIUM mg/dL 8 6 9 0 9 2 9 1   ALK PHOS U/L  --   --   --  58   ALT U/L  --   --   --  13   AST U/L  --   --   --  9     Results from last 7 days   Lab Units 12/19/19  0506   MAGNESIUM mg/dL 1 7     Results from last 7 days   Lab Units 12/19/19  0506   PHOSPHORUS mg/dL 3 1     Results from last 7 days   Lab Units 12/19/19  0506 12/16/19  1345   INR  1 08 1 04   PTT seconds 24 25     No results found for: TROPONINT  ABG:  Lab Results   Component Value Date    PHART 7 484 (H) 01/25/2018    GCH9ZIR 33 6 (L) 01/25/2018    PO2ART 165 1 (H) 01/25/2018    ZME2EUY 24 7 01/25/2018    BEART 1 6 01/25/2018    SOURCE Line, Arterial 01/25/2018       Imaging Studies: I have personally reviewed pertinent reports  and I have personally reviewed pertinent films in PACS  CT head wo contrast   Final Result         1  Stable trace right frontal and interhemispheric subdural hemorrhage without significant change or mass effect  No new hemorrhage  2   Stable slitlike ventricular system and right frontal ventriculostomy catheter  Workstation performed: [id number]         CT head without contrast   Final Result      1  Status post right frontal  shunt placement with tip over the third ventricle  Ventricles are normal in size  2   Small subdural hematomas along the right frontal lobe and posterior right falx are likely postoperative though clinical correlation is recommended  I personally discussed this study with Low Mcnamara on 12/18/2019 at 8:23 PM                         Workstation performed: [id number]         XR chest portable   Final Result      1  Tip of ventricular atrial catheter appears to be in the SVC, superior to the right atrium  2   Small amount of right basilar pleural fluid or thickening  3   Otherwise unremarkable chest             Workstation performed: [id number]         XR chest 1 view   Final Result      2 minutes and 30 seconds of fluoroscopy time utilized for shunt insertion  Workstation performed: [id number]               EKG, Pathology, and Other Studies: I have personally reviewed pertinent reports        VTE  Prophylaxis: Sequential compression device (Venodyne)  and Heparin

## 2019-12-21 NOTE — PLAN OF CARE
Problem: Prexisting or High Potential for Compromised Skin Integrity  Goal: Skin integrity is maintained or improved  Description  INTERVENTIONS:  - Identify patients at risk for skin breakdown  - Assess and monitor skin integrity  - Assess and monitor nutrition and hydration status  - Monitor labs   - Assess for incontinence   - Turn and reposition patient  - Assist with mobility/ambulation  - Relieve pressure over bony prominences  - Avoid friction and shearing  - Provide appropriate hygiene as needed including keeping skin clean and dry  - Evaluate need for skin moisturizer/barrier cream  - Collaborate with interdisciplinary team   - Patient/family teaching  - Consider wound care consult   Outcome: Progressing     Problem: Potential for Falls  Goal: Patient will remain free of falls  Description  INTERVENTIONS:  - Assess patient frequently for physical needs  -  Identify cognitive and physical deficits and behaviors that affect risk of falls    -  [place] fall precautions as indicated by assessment   - Educate patient/family on patient safety including physical limitations  - Instruct patient to call for assistance with activity based on assessment  - Modify environment to reduce risk of injury  - Consider OT/PT consult to assist with strengthening/mobility  Outcome: Progressing

## 2019-12-21 NOTE — ASSESSMENT & PLAN NOTE
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

## 2019-12-22 VITALS
HEART RATE: 53 BPM | RESPIRATION RATE: 16 BRPM | BODY MASS INDEX: 26.91 KG/M2 | OXYGEN SATURATION: 98 % | TEMPERATURE: 98.2 F | DIASTOLIC BLOOD PRESSURE: 69 MMHG | WEIGHT: 151.9 LBS | SYSTOLIC BLOOD PRESSURE: 108 MMHG | HEIGHT: 63 IN

## 2019-12-22 PROCEDURE — 99024 POSTOP FOLLOW-UP VISIT: CPT | Performed by: PHYSICIAN ASSISTANT

## 2019-12-22 RX ORDER — HYDROMORPHONE HYDROCHLORIDE 2 MG/1
1-2 TABLET ORAL EVERY 4 HOURS PRN
Qty: 40 TABLET | Refills: 0 | Status: SHIPPED | OUTPATIENT
Start: 2019-12-22 | End: 2019-12-31 | Stop reason: ALTCHOICE

## 2019-12-22 RX ORDER — SCOLOPAMINE TRANSDERMAL SYSTEM 1 MG/1
1 PATCH, EXTENDED RELEASE TRANSDERMAL
Qty: 5 PATCH | Refills: 0 | Status: SHIPPED | OUTPATIENT
Start: 2019-12-25 | End: 2020-06-07 | Stop reason: HOSPADM

## 2019-12-22 RX ORDER — ACETAMINOPHEN 325 MG/1
650 TABLET ORAL EVERY 4 HOURS PRN
Qty: 30 TABLET | Refills: 0 | Status: SHIPPED | OUTPATIENT
Start: 2019-12-22 | End: 2020-06-07 | Stop reason: HOSPADM

## 2019-12-22 RX ADMIN — SCOPALAMINE 1 PATCH: 1 PATCH, EXTENDED RELEASE TRANSDERMAL at 08:55

## 2019-12-22 NOTE — ASSESSMENT & PLAN NOTE
POD4 image guided insertion of right coronal ventricular-atrial shunt (right)  Assessment:  · Feeling nauseated this morning admits to vomiting  Denies headache  States head pressure has improved since surgery  Denies any chest pain or shortness of breath  · No neuro-focal deficits on exam   · Right side cranial incision and right neck site incision well approximated     Imaging:  · CT head 12/18/19:  Status post right frontal  shunt placement with tip over the 3rd ventricle  Ventricles are normal in size  Small subdural hematomas along the right frontal lobe and posterior right falx are likely postoperative the clinical correlation is recommended  · CXR 12/18/19:  Tip of ventricular atrial catheter appears to be in the SVC, superior to the right atrium  Small amount of right basilar pleural fluid or thickening  Plan:  · Frequent neuro checks  · STAT CT head if decrease in GCS > 2 pts in one hour  · Continue spirinolactone 12 5 mg daily  · CSF fluid sent:  · RBC 24,000, WBC 42, Protein 43, Glucose 71, Cx and gram stain NTD  · Mobilize with PT/OT  · DVT ppx: SCDs and [address]  · Discharge home today  Follow up in 2 weeks for incision check

## 2019-12-22 NOTE — PLAN OF CARE
Problem: Prexisting or High Potential for Compromised Skin Integrity  Goal: Skin integrity is maintained or improved  Description  INTERVENTIONS:  - Identify patients at risk for skin breakdown  - Assess and monitor skin integrity  - Assess and monitor nutrition and hydration status  - Monitor labs   - Assess for incontinence   - Turn and reposition patient  - Assist with mobility/ambulation  - Relieve pressure over bony prominences  - Avoid friction and shearing  - Provide appropriate hygiene as needed including keeping skin clean and dry  - Evaluate need for skin moisturizer/barrier cream  - Collaborate with interdisciplinary team   - Patient/family teaching  - Consider wound care consult   Outcome: Progressing     Problem: Potential for Falls  Goal: Patient will remain free of falls  Description  INTERVENTIONS:  - Assess patient frequently for physical needs  -  Identify cognitive and physical deficits and behaviors that affect risk of falls    -  [place] fall precautions as indicated by assessment   - Educate patient/family on patient safety including physical limitations  - Instruct patient to call for assistance with activity based on assessment  - Modify environment to reduce risk of injury  - Consider OT/PT consult to assist with strengthening/mobility  Outcome: Progressing

## 2019-12-22 NOTE — DISCHARGE INSTRUCTIONS
 Monitor incisions daily  Keep incision clean and dry   May shower and wash hair 72 hours after surgery   Avoid rubbing the incision, but gently massage hair   Do not use a hair dryer, and avoid hair products such as mousse, oils, and gels  Do not brush your hair away from the incision since this will put strain on the suture line   Do not dye or perm hair until cleared by MD Carlos Lawrence Please remove dressing within 1-2 days after surgery   No soaking in tub   May walk as tolerated: 3 short walks daily   Avoid heavy lifting, pushing or pulling over 10lbs for 2 weeks   Do not drive until cleared by MD/PA   Coumadin, ASA, Plavix may be restarted once cleared by MD Carlos Lawrence If you ever require a MRI a skull X-ray must be done before and after the MRI  There may be a need to reprogram your shunt, so contact the office   Follow-up for a 2 week incision check as scheduled  Follow-up for a 6 week post-operative visit with a repeat CT head to be completed prior to your visit  **Please contact MD if incision becomes red, swelling, and tender or has increased drainage  Or if you experience increased headaches, difficulties walking, nausea/vomiting, changes in vision, and increased drowsiness or temp>101   **

## 2019-12-22 NOTE — DISCHARGE SUMMARY
Discharge- Emily Pert 1975, 38 y o  female MRN: [id number]    Unit/Bed#: [facility] 380-41 Encounter: [id number]    Primary Care Provider: Ruthy Muir DO   Date and time admitted to hospital: 12/18/2019  5:48 AM        * Pseudotumor cerebri  Assessment & Plan  POD4 image guided insertion of right coronal ventricular-atrial shunt (right)  Assessment:  · Feeling nauseated this morning admits to vomiting  Denies headache  States head pressure has improved since surgery  Denies any chest pain or shortness of breath  · No neuro-focal deficits on exam   · Right side cranial incision and right neck site incision well approximated     Imaging:  · CT head 12/18/19:  Status post right frontal  shunt placement with tip over the 3rd ventricle  Ventricles are normal in size  Small subdural hematomas along the right frontal lobe and posterior right falx are likely postoperative the clinical correlation is recommended  · CXR 12/18/19:  Tip of ventricular atrial catheter appears to be in the SVC, superior to the right atrium  Small amount of right basilar pleural fluid or thickening  Plan:  · Frequent neuro checks  · STAT CT head if decrease in GCS > 2 pts in one hour  · Continue spirinolactone 12 5 mg daily  · CSF fluid sent:  · RBC 24,000, WBC 42, Protein 43, Glucose 71, Cx and gram stain NTD  · Mobilize with PT/OT  · DVT ppx: SCDs and [address]  · Discharge home today  Follow up in 2 weeks for incision check  Papilledema  Assessment & Plan  2 diopter papilledema OD  2 + diopter papilledema OS  · Follow up with ophthalmologist outpatient     Postgastrectomy malabsorption  Assessment & Plan  · S/p failed sleeve gastrectomy at OSH January 2018 with perforation of the gastric sleeve and 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      Gastroesophageal reflux disease  Assessment & Plan  · Continue home protonix and Carafate       subjective/objective:  CC: "When can I go home?"    Subjective: patient stating she is feeling better today and wishes to go home  She still admits to having some difficulty reading her large phone on her phone  She also complains of a tingling sensation around her right ear that feels like her ear is falling off when she touches them  Objective: laying in bed, NAD  Physical exam:  General appearance: alert, NAD  Head: normocephalic  Right frontal incision well approximated without erythema  Shunt reservoir pumps and refills briskly, tenderness with palpation  Eyes: EOMI, PERRL  Vision grossly intact  Lungs: non labored breathing  Heart: regular heart rate  Neurologic:  Mental status: Alert, oriented x3, thought content appropriate  Speech is fluent and clear  CN: grossly intact (CN 11-X11)  Facial symmetry at rest and with expression  Tongue midline  Sensation: normal LT sensation overlying face and bilateral upper and lower extremities  Motor: LATHAM full strength  Reflexes: 1+ in BUE and BLE  Negative hoffmans or clonus  Coordination: No PD, finger to nose intact  Admission Date:   Admission Orders (From admission, onward)     Ordered        12/18/19 1453  Inpatient Admission  Once                     Discharge Date:   12/22/2019    Admitting Diagnosis: Pseudotumor cerebri [G93 2]  Papilledema [H47 10]    Discharge Diagnosis:   Pseudotumor cerebri  papilledemia  Postgastrectomy malabsorption  GERD    Resolved Problems  Date Reviewed: 12/18/2019    None          Attending Physician: Jessica Bishop MD    Procedures Performed: Procedure(s):  IMAGE GUIDED [facility]    Radiology:   CT head wo contrast   Final Result         1  Stable trace right frontal and interhemispheric subdural hemorrhage without significant change or mass effect  No new hemorrhage  2   Stable slitlike ventricular system and right frontal ventriculostomy catheter                    Workstation performed: [id number]         CT head without contrast   Final Result      1  Status post right frontal  shunt placement with tip over the third ventricle  Ventricles are normal in size  2   Small subdural hematomas along the right frontal lobe and posterior right falx are likely postoperative though clinical correlation is recommended  I personally discussed this study with Brown Sweeney on 12/18/2019 at 8:23 PM                         Workstation performed: [id number]         XR chest portable   Final Result      1  Tip of ventricular atrial catheter appears to be in the SVC, superior to the right atrium  2   Small amount of right basilar pleural fluid or thickening  3   Otherwise unremarkable chest             Workstation performed: [id number]         XR chest 1 view   Final Result      2 minutes and 30 seconds of fluoroscopy time utilized for shunt insertion  Workstation performed: [facility] Course:  Ms David Izaguirre is a 38year old female who has a history of pseudotumor cerebri which was treated with a lumbar peritoneal shunt placed by another surgeon that out in the past   Unfortunately she has had complications of similar symptoms of her previous history of pseudotumor cerebri  She a confirm papilledema  She was previously admitted for 5 months in the hospital for sepsis and abdominal wound infection  She was found to be an appropriate candidate for a ventricular atrial shunt  Ms Ernestina Root presented to [facility] on December 18, 2019  She was placed under general anesthesia  Her procedure was completed without any complications and minimal blood loss  She was observed in PACU until appropriate to transfer to a medical-surgical floor  She underwent a CT scan and a chest x-ray  She mobilized with physical and occupational therapies found she was appropriate for home  Ms Ernestina Root suffered from nausea and vomiting during her hospitalization stay    She was found medically appropriate on December 22, 2019  Prior to discharge, discussed with patient is discharge instructions which during this time all questions and concerns were addressed  She will follow up in 2 weeks for an incision check  Discussed at length that patient needs to follow up with her ophthalmologist outpatient which she states she already has an appointment for  Condition at Discharge: good     Discharge Instructions/Information to Patient and Family:   See after visit summary for information provided to patient and family  Provisions for Follow-Up Care:  See after visit summary for information related to follow-up care and any pertinent home health orders  Disposition: Home    Planned Readmission: No    Discharge Statement   I spent 35 minutes discharging the patient  This time was spent on the day of discharge  I had direct contact with the patient on the day of discharge  Additional documentation is required if more than 30 minutes were spent on discharge  Discharge Medications:  See after visit summary for reconciled discharge medications provided to patient and family

## 2019-12-23 ENCOUNTER — TELEPHONE (OUTPATIENT)
Dept: NEUROSURGERY | Facility: CLINIC | Age: 44
End: 2019-12-23

## 2019-12-23 ENCOUNTER — TRANSITIONAL CARE MANAGEMENT (OUTPATIENT)
Dept: INTERNAL MEDICINE CLINIC | Facility: CLINIC | Age: 44
End: 2019-12-23

## 2019-12-23 NOTE — TELEPHONE ENCOUNTER
1st attempt - Called Norman Waterman on primary contact number after surgery 12/18/2019 to check in on recovery and provide post surgical instructions  Got voicemail, left message to callback  Will make another attempt if no callback is received

## 2019-12-24 NOTE — TELEPHONE ENCOUNTER
2nd attempt- Called patient to provide post op call  Patient did not answer  Left a voice message with my direct line

## 2019-12-24 NOTE — TELEPHONE ENCOUNTER
Mario Cain returned the call  Patient reports she is doing okay at home  She denies any incisional issues or fevers  Patient is able to ambulate around the house and complete ADLs  Patient reports blurry vision when she is looking at her phone or reading a book  She reports this started right after the surgery  Discussed with Yulisa Cooper PA-C  Ed recommended for the patient to follow up with an ophthalmologist within the next week or so and to follow up with Neurosurgery on 12/31/19 as previously scheduled  Patient inquired if she can return to driving  Ed said the patient should not drive and will be assessed at the appointment on 12/31/19  Informed patient of this and she expressed understanding  Patient reports she is scheduled with an ophthalmologist within the next few weeks  She was unable to give a date  Reviewed incision care with the patient  Instructed patient to shower daily and to use a mild soap to cleanse the incision with gentle pressure  Instructed patient to not use any ointments, creams, or lotions on the incision  Patient is aware to call the office if any redness, swelling, drainage, dehiscence of incision, or fever >100 F occurs  Patient is aware to call the office if any concerns or questions may arise  Reminded patient of her upcoming appointments with the date/time/location  Patient was appreciative for the call

## 2019-12-29 DIAGNOSIS — E66.01 MORBID (SEVERE) OBESITY DUE TO EXCESS CALORIES (HCC): ICD-10-CM

## 2019-12-29 RX ORDER — OMEPRAZOLE 40 MG/1
CAPSULE, DELAYED RELEASE ORAL
Qty: 60 CAPSULE | Refills: 0 | Status: SHIPPED | OUTPATIENT
Start: 2019-12-29 | End: 2020-01-17 | Stop reason: SDUPTHER

## 2019-12-31 ENCOUNTER — OFFICE VISIT (OUTPATIENT)
Dept: NEUROSURGERY | Facility: CLINIC | Age: 44
End: 2019-12-31

## 2019-12-31 ENCOUNTER — TELEPHONE (OUTPATIENT)
Dept: INTERNAL MEDICINE CLINIC | Facility: CLINIC | Age: 44
End: 2019-12-31

## 2019-12-31 VITALS
HEART RATE: 75 BPM | HEIGHT: 63 IN | DIASTOLIC BLOOD PRESSURE: 74 MMHG | BODY MASS INDEX: 26.75 KG/M2 | TEMPERATURE: 98 F | SYSTOLIC BLOOD PRESSURE: 118 MMHG | WEIGHT: 151 LBS

## 2019-12-31 DIAGNOSIS — Z48.89 AFTERCARE FOLLOWING SURGERY FOR INJURY AND TRAUMA: Primary | ICD-10-CM

## 2019-12-31 PROCEDURE — 99024 POSTOP FOLLOW-UP VISIT: CPT | Performed by: PHYSICIAN ASSISTANT

## 2019-12-31 NOTE — PROGRESS NOTES
Patient ID: Brenda Shearer is a 38 y o  female  Diagnoses and all orders for this visit:    Aftercare following surgery for injury and trauma  -     CT head without contrast; Future          Assessment/Plan:    Very pleasant 60-year-old female, accompanied by her significant other, returns for 2 week postoperative follow-up  She is status post colon "image guided insertion of right coronal ventricular-atrial shunt", 12/18/19, by Dr Stacey Marshall  She reports she is doing well, but does report she has cold symptoms which started about 2 days ago, specifically general malaise, she denies fever or chills, she reports some decrease in her appetite, she does report persistent visual changes  She reports overall she is pleased with surgical outcome  She understands she is to continue with restricted activities, specifically avoid lifting, pushing, pulling greater than 10 lb  She understands she may ambulate as tolerated which is encouraged  She understands she is to avoid immersion in water such as swimming, hot tubs or tub bath  She also understands she is to avoid harsh chemicals on her scalp such as hair dyes or other a chemicals used by be to shin  She understands she may shower, she washes scalp with a gentle shampoo and pot pat the area dry  She is to continue to observe for any signs of wound infection, specifically swelling, increased pain, drainage, fever or chills, and she understands should she notice any of these she should call Neurosurgery immediately  Further follow-up with Neurosurgery is planned in approximately 4 weeks with Dr Stacey Marshall  A CT head a few days prior to follow-up  She understands to continue to keep her planned follow-up with ophthalmology for surveillance of her papilledema, or vision changes  Return in about 4 weeks (around 1/28/2020) for Postoperative follow-up Dr Stacey Marshall, review CT head      Chief Complaint  Postoperative follow-up, status post ventricular atrial shunt placement  HPI       The following portions of the patient's history were reviewed and updated as appropriate: allergies, current medications, past family history, past medical history, past social history and past surgical history  Review of Systems   Constitutional: Negative  HENT: Positive for ear pain (and numbness on right ear)  Has a cold   Eyes: Positive for visual disturbance (blurred vision  Optho appt on 1/2020)  Respiratory: Positive for cough (Has a cold)  Cardiovascular: Negative  Gastrointestinal: Negative  Endocrine: Negative  Genitourinary: Positive for difficulty urinating  Burns when she urinates   Musculoskeletal: Negative  Skin: Negative  Allergic/Immunologic: Negative  Neurological: Positive for dizziness (with positional changes)  Hematological: Negative  Psychiatric/Behavioral: Negative  All other systems reviewed and are negative  Objective:    Physical Exam   Constitutional: She is oriented to person, place, and time  She appears well-developed and well-nourished  HENT:   Head: Normocephalic and atraumatic  Surgical incision:    Right frontoparietal, 2 5 cm elliptical incision, closed with absorbable suture, without evidence of erythema, edema, drainage, clean dry  No sign of wound infection  Eyes: Pupils are equal, round, and reactive to light  EOM are normal    Neck:   Surgical incision:    Right lateral neck, approximately 2 5 cm in length, without evidence of erythema, edema, clean and dry, sutures absorbable, remain in place  There is no sign wound infection   Cardiovascular: Normal rate and regular rhythm  Pulmonary/Chest: Effort normal and breath sounds normal    Musculoskeletal:   Lower extremities: There is no calf tenderness, palpable masses, erythema or edema, Homans sign is negative  No evidence of DVT bilaterally     Neurological: She is alert and oriented to person, place, and time  Skin: Skin is warm and dry  Psychiatric: She has a normal mood and affect  Vitals reviewed  Neurologic Exam     Mental Status   Oriented to person, place, and time  Cranial Nerves     CN III, IV, VI   Pupils are equal, round, and reactive to light    Extraocular motions are normal

## 2019-12-31 NOTE — TELEPHONE ENCOUNTER
Patient calling because she had surgery last Wednesday and has been having difficulty urinating since  Patient having a hard time leaving the house and was wondering if we could send something in to [facility] in [facility]  She is having burning and the urge to urinate but only a little comes out  I explained that more then likely she is going to need to be seen, but she wanted to me to ask anyway

## 2019-12-31 NOTE — PATIENT INSTRUCTIONS
Continue with restricted activities, specifically avoid lifting, pushing, pulling greater than 10 lb  Ambulate as tolerated  Observe surgical wounds for any changes such as redness, swelling, drainage, increased pain should these occur they suggest infection contact Neurosurgery immediately  Continue to following your ophthalmologist per their protocols  Further follow-up with Neurosurgery, Dr Brittany Laura in approximately 4 weeks  CT head a few days prior to follow-up visit

## 2019-12-31 NOTE — TELEPHONE ENCOUNTER
Patient called back, I called and we spoke to dr Sofia Day per her she would like her to go be seen in the ER due to symptoms and to be evaluated

## 2020-01-02 ENCOUNTER — TELEPHONE (OUTPATIENT)
Dept: INTERNAL MEDICINE CLINIC | Facility: CLINIC | Age: 45
End: 2020-01-02

## 2020-01-02 DIAGNOSIS — R30.0 DYSURIA: Primary | ICD-10-CM

## 2020-01-02 NOTE — TELEPHONE ENCOUNTER
Patient called back again in regards to the task below - She states that she would like to speak w/ Dr Candance Kitchens ASAP! Can you please give the patient a call back today as soon as you can  Thank you!

## 2020-01-03 ENCOUNTER — OFFICE VISIT (OUTPATIENT)
Dept: INTERNAL MEDICINE CLINIC | Age: 45
End: 2020-01-03
Payer: COMMERCIAL

## 2020-01-03 VITALS
TEMPERATURE: 97.9 F | DIASTOLIC BLOOD PRESSURE: 72 MMHG | BODY MASS INDEX: 23.42 KG/M2 | HEART RATE: 74 BPM | OXYGEN SATURATION: 98 % | WEIGHT: 132.2 LBS | SYSTOLIC BLOOD PRESSURE: 118 MMHG

## 2020-01-03 DIAGNOSIS — R30.0 BURNING WITH URINATION: Primary | ICD-10-CM

## 2020-01-03 DIAGNOSIS — N30.01 ACUTE CYSTITIS WITH HEMATURIA: ICD-10-CM

## 2020-01-03 PROCEDURE — 1111F DSCHRG MED/CURRENT MED MERGE: CPT | Performed by: FAMILY MEDICINE

## 2020-01-03 PROCEDURE — 99213 OFFICE O/P EST LOW 20 MIN: CPT | Performed by: FAMILY MEDICINE

## 2020-01-03 PROCEDURE — 1036F TOBACCO NON-USER: CPT | Performed by: FAMILY MEDICINE

## 2020-01-03 RX ORDER — PHENAZOPYRIDINE HYDROCHLORIDE 100 MG/1
100 TABLET, FILM COATED ORAL 3 TIMES DAILY PRN
Qty: 6 TABLET | Refills: 0 | Status: SHIPPED | OUTPATIENT
Start: 2020-01-03 | End: 2020-06-07 | Stop reason: HOSPADM

## 2020-01-03 RX ORDER — CIPROFLOXACIN 500 MG/1
500 TABLET, FILM COATED ORAL EVERY 12 HOURS SCHEDULED
Qty: 10 TABLET | Refills: 0 | Status: SHIPPED | OUTPATIENT
Start: 2020-01-03 | End: 2020-01-08

## 2020-01-03 NOTE — PROGRESS NOTES
Assessment/Plan:    No problem-specific Assessment & Plan notes found for this encounter  Problem List Items Addressed This Visit        Genitourinary    Acute cystitis with hematuria    Relevant Medications    ciprofloxacin (CIPRO) 500 mg tablet    phenazopyridine (PYRIDIUM) 100 mg tablet      Other Visit Diagnoses     Burning with urination    -  Primary    Relevant Medications    ciprofloxacin (CIPRO) 500 mg tablet    phenazopyridine (PYRIDIUM) 100 mg tablet    Other Relevant Orders    POCT urine dip            Will send for urine culture  Urinalysis in our office is markedly positive  Subjective:      Patient ID: Radha Brooks is a 38 y o  female  HPI     Has been having burning and frequent urination and trickling since she was in the hospital   She did have a catheter at that time and when it was DC was having difficulty  After bladder scan they be inserted another 1  Her symptoms got much worse since she has been home  She is using over-the-counter azo with minimal relief      The following portions of the patient's history were reviewed and updated as appropriate: allergies, current medications, past family history, past medical history, past social history, past surgical history and problem list     Review of Systems    Constitutional:  Denies fever or chills   Eyes:  Denies double or blurry vision  HENT:  Denies nasal congestion or sore throat   Respiratory:  Denies cough or shortness of breath or wheezing  Cardiovascular:  Denies palpitations or chest pain  GI:  Denies abdominal pain, nausea, or vomiting  Integument:  Denies rash , no open areas  Neurologic:  Denies headache or focal weakness      Objective:      /72 (BP Location: Left arm, Patient Position: Sitting, Cuff Size: Adult)   Pulse 74   Temp 97 9 °F (36 6 °C) (Tympanic)   Wt 60 kg (132 lb 3 2 oz)   LMP  (LMP Unknown)   SpO2 98%   BMI 23 42 kg/m²          Physical Exam      Constitutional:  Well developed, well nourished, no acute distress, non-toxic appearance   Eyes:  PERRL, conjunctiva normal , non icteric sclera  HENT:  Atraumatic, oropharynx moist  Neck-  supple   Respiratory:  CTA b/l, normal breath sounds, no rales, no wheezing   Cardiovascular:  RRR, no murmurs, no LE edema b/l  GI:  Soft, nondistended, normal bowel sounds x 4, nontender, no organomegaly, no mass, no rebound, no guarding   Neurologic:  no focal deficits noted   Psychiatric:  Speech and behavior appropriate , AAO x 3  Skin, right side of neck surgical scar healing very well    No sign of infection

## 2020-01-06 ENCOUNTER — APPOINTMENT (OUTPATIENT)
Dept: LAB | Facility: HOSPITAL | Age: 45
End: 2020-01-06
Attending: FAMILY MEDICINE
Payer: COMMERCIAL

## 2020-01-06 DIAGNOSIS — N30.01 ACUTE CYSTITIS WITH HEMATURIA: Primary | ICD-10-CM

## 2020-01-06 PROCEDURE — 87077 CULTURE AEROBIC IDENTIFY: CPT

## 2020-01-06 PROCEDURE — 87086 URINE CULTURE/COLONY COUNT: CPT

## 2020-01-06 PROCEDURE — 87186 SC STD MICRODIL/AGAR DIL: CPT

## 2020-01-06 NOTE — PROGRESS NOTES
Call received from [facility] that the wrong order was put in for the grey top urine culture that was sent from our office  New order placed to reflect correct need  OV documentation was stated that the urine was to be sent for culture only

## 2020-01-08 LAB
BACTERIA UR CULT: ABNORMAL
BACTERIA UR CULT: ABNORMAL

## 2020-01-10 NOTE — PLAN OF CARE
01/09/20 [employer] Yes  (Given by [employer]) CARDIOVASCULAR - ADULT     Maintains optimal cardiac output and hemodynamic stability Progressing     Absence of cardiac dysrhythmias or at baseline rhythm Progressing        DISCHARGE PLANNING - CARE MANAGEMENT     Discharge to post-acute care or home with appropriate resources Progressing        GASTROINTESTINAL - ADULT     Minimal or absence of nausea and/or vomiting Progressing     Maintains or returns to baseline bowel function Progressing     Maintains adequate nutritional intake Progressing     Establish and maintain optimal ostomy function Progressing        GENITOURINARY - ADULT     Maintains or returns to baseline urinary function Progressing     Absence of urinary retention Progressing     Urinary catheter remains patent Progressing        HEMATOLOGIC - ADULT     Maintains hematologic stability Progressing        METABOLIC, FLUID AND ELECTROLYTES - ADULT     Electrolytes maintained within normal limits Progressing     Fluid balance maintained Progressing     Glucose maintained within target range Progressing        MUSCULOSKELETAL - ADULT     Maintain or return mobility to safest level of function Progressing     Maintain proper alignment of affected body part Progressing        NEUROSENSORY - ADULT     Achieves stable or improved neurological status Progressing     Achieves maximal functionality and self care Progressing        Nutrition/Hydration-ADULT     Nutrient/Hydration intake appropriate for improving, restoring or maintaining nutritional needs Progressing        Potential for Falls     Patient will remain free of falls Progressing        Prexisting or High Potential for Compromised Skin Integrity     Skin integrity is maintained or improved Progressing        RESPIRATORY - ADULT     Achieves optimal ventilation and oxygenation Progressing        SKIN/TISSUE INTEGRITY - ADULT     Skin integrity remains intact Progressing     Incision(s), wounds(s) or drain site(s) healing without S/S of infection Progressing     Oral mucous membranes remain intact Progressing

## 2020-01-16 ENCOUNTER — TELEPHONE (OUTPATIENT)
Dept: NEUROSURGERY | Facility: CLINIC | Age: 45
End: 2020-01-16

## 2020-01-16 ENCOUNTER — OFFICE VISIT (OUTPATIENT)
Dept: BARIATRICS | Facility: CLINIC | Age: 45
End: 2020-01-16
Payer: COMMERCIAL

## 2020-01-16 VITALS
SYSTOLIC BLOOD PRESSURE: 114 MMHG | BODY MASS INDEX: 23.74 KG/M2 | RESPIRATION RATE: 18 BRPM | WEIGHT: 134 LBS | HEART RATE: 62 BPM | TEMPERATURE: 97.3 F | DIASTOLIC BLOOD PRESSURE: 68 MMHG | HEIGHT: 63 IN

## 2020-01-16 DIAGNOSIS — Z98.84 BARIATRIC SURGERY STATUS: Primary | ICD-10-CM

## 2020-01-16 DIAGNOSIS — K21.00 GASTROESOPHAGEAL REFLUX DISEASE WITH ESOPHAGITIS: Primary | ICD-10-CM

## 2020-01-16 DIAGNOSIS — K21.00 GASTROESOPHAGEAL REFLUX DISEASE WITH ESOPHAGITIS: ICD-10-CM

## 2020-01-16 PROCEDURE — 99214 OFFICE O/P EST MOD 30 MIN: CPT | Performed by: SURGERY

## 2020-01-16 RX ORDER — LANOLIN ALCOHOL/MO/W.PET/CERES
1 CREAM (GRAM) TOPICAL 2 TIMES DAILY
COMMUNITY

## 2020-01-16 NOTE — PROGRESS NOTES
Assessment/Plan:     Diagnoses and all orders for this visit:    Bariatric surgery status  -s/p Vertical Sleeve Gastrectomy with Dr Carla Erazo in 2018 with post operative course complicated by leak at her GE junction with open abdomen and prolonged ICU hospitalization  Dr Jose Brown performed a padlock device for endoscopic closure of her fistula tract in 2018  She is now here for 3 month follow up regarding chronic reflux  Patient wishes to discuss STRETTA  · Lengthy discussion held in clinic today with Dr Jose Brown regarding EGD procedure with [facility] for symptomatic control  Patient's case complicated with padlock device  Patient aware that it is unknown if a STRETTA can be performed with titanium padlock device present and awaiting device companies recommendations  Additionally, if device is not adequately scarred down the [facility] may not be completed  · Due to her extensive history other options for reflux symptomatic control ruled out at this time  · Continue omeprazole 40mg once daily   · Follow up after EGD in office in 4 weeks    Gastroesophageal reflux disease with esophagitis  - Continue omeprazole 40mg once daily   - EGD; future  - Call or return to clinic if symptoms worsen       Subjective:      Patient ID: Antonio Biswas is a 38 y o  female  HPI: Patient presenting to clinic today status post sleeve gastrectomy with extensive post operative complications and 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 in 2018 to discuss reflux options  The following portions of the patient's history were reviewed and updated as appropriate: allergies, current medications, past family history, past medical history, past social history, past surgical history and problem list     Review of Systems   Constitutional: Negative for fever and unexpected weight change  HENT:        + heartburn; + regurgitation; neck pain from recent stent placement    Gastrointestinal: Positive for vomiting   Negative for abdominal distention and abdominal pain  Skin: Positive for wound (Right neck surgical incision for stent placement)  Objective:    /68   Pulse 62   Temp (!) 97 3 °F (36 3 °C)   Resp 18   Ht 5' 3" (1 6 m)   Wt 60 8 kg (134 lb)   LMP  (LMP Unknown)   BMI 23 74 kg/m²      Physical Exam   Constitutional: She is oriented to person, place, and time  She appears well-developed and well-nourished  No distress  HENT:   Head: Normocephalic  Right side of neck surgical incision from stent placement clean, dry, intact   Eyes: Conjunctivae and EOM are normal  No scleral icterus  Neck: Normal range of motion  Neck supple  Cardiovascular: Normal rate  Pulmonary/Chest: Effort normal  No respiratory distress  Abdominal: Soft  She exhibits no distension  There is no tenderness  Musculoskeletal: Normal range of motion  Neurological: She is alert and oriented to person, place, and time  Skin: Skin is warm and dry  Psychiatric: She has a normal mood and affect  Her behavior is normal    Vitals reviewed

## 2020-01-16 NOTE — H&P (VIEW-ONLY)
Assessment/Plan:     Diagnoses and all orders for this visit:    Bariatric surgery status  -s/p Vertical Sleeve Gastrectomy with Dr Jose A Zhao in 2018 with post operative course complicated by leak at her GE junction with open abdomen and prolonged ICU hospitalization  Dr Claudell Payment performed a padlock device for endoscopic closure of her fistula tract in 2018  She is now here for 3 month follow up regarding chronic reflux  Patient wishes to discuss STRETTA  · Lengthy discussion held in clinic today with Dr Claudell Payment regarding EGD procedure with [facility] for symptomatic control  Patient's case complicated with padlock device  Patient aware that it is unknown if a STRETTA can be performed with titanium padlock device present and awaiting device companies recommendations  Additionally, if device is not adequately scarred down the [facility] may not be completed  · Due to her extensive history other options for reflux symptomatic control ruled out at this time  · Continue omeprazole 40mg once daily   · Follow up after EGD in office in 4 weeks    Gastroesophageal reflux disease with esophagitis  - Continue omeprazole 40mg once daily   - EGD; future  - Call or return to clinic if symptoms worsen       Subjective:      Patient ID: Julieta Pappas is a 38 y o  female  HPI: Patient presenting to clinic today status post sleeve gastrectomy with extensive post operative complications and 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 in 2018 to discuss reflux options  The following portions of the patient's history were reviewed and updated as appropriate: allergies, current medications, past family history, past medical history, past social history, past surgical history and problem list     Review of Systems   Constitutional: Negative for fever and unexpected weight change  HENT:        + heartburn; + regurgitation; neck pain from recent stent placement    Gastrointestinal: Positive for vomiting   Negative for abdominal distention and abdominal pain  Skin: Positive for wound (Right neck surgical incision for stent placement)  Objective:    /68   Pulse 62   Temp (!) 97 3 °F (36 3 °C)   Resp 18   Ht 5' 3" (1 6 m)   Wt 60 8 kg (134 lb)   LMP  (LMP Unknown)   BMI 23 74 kg/m²      Physical Exam   Constitutional: She is oriented to person, place, and time  She appears well-developed and well-nourished  No distress  HENT:   Head: Normocephalic  Right side of neck surgical incision from stent placement clean, dry, intact   Eyes: Conjunctivae and EOM are normal  No scleral icterus  Neck: Normal range of motion  Neck supple  Cardiovascular: Normal rate  Pulmonary/Chest: Effort normal  No respiratory distress  Abdominal: Soft  She exhibits no distension  There is no tenderness  Musculoskeletal: Normal range of motion  Neurological: She is alert and oriented to person, place, and time  Skin: Skin is warm and dry  Psychiatric: She has a normal mood and affect  Her behavior is normal    Vitals reviewed

## 2020-01-16 NOTE — TELEPHONE ENCOUNTER
"Requested Prescriptions   Pending Prescriptions Disp Refills     FLUoxetine (PROZAC) 20 MG capsule [Pharmacy Med Name: FLUOXETINE 20MG CAPSULES] 30 capsule 0    Last Written Prescription Date:  11/16/18  Last Fill Quantity: 30,  # refills: 11   Last office visit: 11/16/2018 with prescribing provider:  Jacquelyn   Future Office Visit:     Sig: TAKE 1 CAPSULE BY MOUTH DAILY    SSRIs Protocol Passed - 12/16/2018 11:05 AM       Passed - PHQ-9 score less than 5 in past 6 months    Please review last PHQ-9 score.          Passed - Patient is age 18 or older       Passed - No active pregnancy on record       Passed - No positive pregnancy test in last 12 months       Passed - Recent (6 mo) or future (30 days) visit within the authorizing provider's specialty    Patient had office visit in the last 6 months or has a visit in the next 30 days with authorizing provider or within the authorizing provider's specialty.  See \"Patient Info\" tab in inbasket, or \"Choose Columns\" in Meds & Orders section of the refill encounter.              " Pt reports she has a hair appt tomorrow for cut and color and questions if she can actually do this  Explained that in 2 weeks at her 6 week f/u with surgeon she may discuss but she should proceed at this time  She stated an understanding

## 2020-01-17 RX ORDER — OMEPRAZOLE 20 MG/1
20 CAPSULE, DELAYED RELEASE ORAL DAILY
Qty: 90 CAPSULE | Refills: 1 | Status: SHIPPED | OUTPATIENT
Start: 2020-01-17 | End: 2020-01-30 | Stop reason: ALTCHOICE

## 2020-01-17 RX ORDER — OXYCODONE HYDROCHLORIDE 5 MG/1
5 TABLET ORAL EVERY 4 HOURS PRN
Qty: 10 TABLET | Refills: 0 | Status: SHIPPED | OUTPATIENT
Start: 2020-01-17 | End: 2020-06-07 | Stop reason: HOSPADM

## 2020-01-23 ENCOUNTER — TELEPHONE (OUTPATIENT)
Dept: NEUROSURGERY | Facility: CLINIC | Age: 45
End: 2020-01-23

## 2020-01-27 ENCOUNTER — HOSPITAL ENCOUNTER (OUTPATIENT)
Dept: CT IMAGING | Facility: HOSPITAL | Age: 45
Discharge: HOME | End: 2020-01-27
Payer: COMMERCIAL

## 2020-01-27 DIAGNOSIS — Z48.89 AFTERCARE FOLLOWING SURGERY FOR INJURY AND TRAUMA: ICD-10-CM

## 2020-01-27 PROCEDURE — 70450 CT HEAD/BRAIN W/O DYE: CPT

## 2020-01-28 ENCOUNTER — OFFICE VISIT (OUTPATIENT)
Dept: NEUROSURGERY | Facility: CLINIC | Age: 45
End: 2020-01-28

## 2020-01-28 VITALS
WEIGHT: 134 LBS | BODY MASS INDEX: 23.74 KG/M2 | HEIGHT: 63 IN | HEART RATE: 55 BPM | TEMPERATURE: 97.3 F | DIASTOLIC BLOOD PRESSURE: 63 MMHG | SYSTOLIC BLOOD PRESSURE: 123 MMHG | RESPIRATION RATE: 16 BRPM

## 2020-01-28 DIAGNOSIS — Z48.89 AFTERCARE FOLLOWING SURGERY: Primary | ICD-10-CM

## 2020-01-28 PROCEDURE — 99024 POSTOP FOLLOW-UP VISIT: CPT | Performed by: NEUROLOGICAL SURGERY

## 2020-01-28 NOTE — PROGRESS NOTES
[facility] Office Note  Christiana Patrick is a 38 y o  female      Visit Type: Post-op      Diagnoses and all orders for this visit:    Aftercare following surgery  -     CT head wo contrast; Future        DISCUSSION SUMMARY  This is a 67-year-old female with a very complicated past medical history for pseudotumor cerebri  She is now status post a ventricular atrial shunt  She is doing very well from this overall  She does have headaches however she abruptly stopped all of her pain medications and medical marijuana  We discussed tapering off of these medications slowly  I recommended a repeat CT scan of the brain and follow-up evaluation in 6 months time    Return in 6 months (on 7/28/2020)  CHIEF COMPLAINT  Patient presents for 6 weeks post-op with CT head    NEUROSURGERY PROCEDURES  11/19/15 (Dr Bridgette Church) 1  Diagnostic laparoscopy  2  Laparoscopic-assited placement of lumbar peritoneal shunt  11/19/15 (Dr Bridgette Church / Dr Chantal Mayer) Placement of a lumboperitoneal shunt system  We implanted a HV valve made by [employer]  This was a horizontal 50-80 mm of water and vertical was 230-320 mm of water  5/31/17 (DKO) IMAGE GUIDED CORONAL PLACEMENT OF PROGRAMABLE VENTRICULAR-PERITONEAL SHUNT, REMOVAL OF LP SHUNT (Right Head)  1/25/18 ([facility] / Dr Earnest Pappas)  Externalization of right-sided SHUNT VENTRICULAR-PERITONEAL in anterior chest wall ribs two and three level   (Right Chest); Exploratory Laparotomy, wash out,placement of drains, placement of NG feeding tube  (N/A Abdomen)  2/5/18 (DKO) Removal of  shunt (Right Head)  12/18/19 (DKO) IMAGE GUIDED INSERTION OF RIGHT CORONAL VENTRICULAR-ATRIAL SHUNT  HISTORY OF PRESENT ILLNESS  Patient is known to our practice for the above procedures  Se has a very complicated past medical history  She was admitted with a 5 month hospitalization for sepsis and abdominal wound infection       She has a history of pseudotumor cerebri which was initially treated with a lumbar peritoneal shunt placed by another surgeon  This failed  An image guided ventriculoperitoneal shunt was placed but had to be removed after her abdominal sepsis occurred  Unfortunately she complained of similar symptoms to her previous history of pseudotumor cerebri  She went to her ophthalmologist who found palpable edema  This was confirmed when seen in our office and believed that she had symptomatic pseudotumor cerebri once again  We recommended a ventricular atrial shunt given her history  She complains of bifrontal headache with numbness of her ear which is slowly improving and numbness of her chin which is slowly improving  She abruptly stopped her narcotic medications as well as medical marijuana  She complains of 7/10 frontal headache currently  Review of Systems   Constitutional: Negative  HENT: Negative  Eyes: Negative  Respiratory: Negative  Cardiovascular: Negative  Gastrointestinal: Negative  Endocrine: Negative  Genitourinary: Negative  Musculoskeletal: Negative  Skin: Negative  Allergic/Immunologic: Negative  Neurological: Positive for headaches (head pain in the frontal region rated at 7/10, 10 being the worse)  Hematological: Negative  Psychiatric/Behavioral: Negative  All other systems reviewed and are negative  I reviewed the ROS  MEDICAL HISTORY  Past Medical History:   Diagnosis Date    Abdominal pain     Brain condition     Pseudotumor Cerebri     Chronic kidney disease     De Quervain's disease (tenosynovitis)     Depression 1/10/2019    Esophageal perforation     Gastric leak     GERD (gastroesophageal reflux disease)     Idiopathic intracranial hypertension     Kidney stone     Migraine     No blood products     per pt: personal and Jewish beliefs   surgeon office aware 12/13/19    Obesity     Papilledema, both eyes     Postgastrectomy malabsorption     Presence of lumboperitoneal shunt     Resolved: Sep 20, 2017    Rotator cuff tendinitis     Resolved: Aug 23, 2017    Visual field defect        Past Surgical History:   Procedure Laterality Date    CSF SHUNT      LP shunt - 2015 -  shunt - 2017    ESOPHAGOGASTRODUODENOSCOPY N/A 2/23/2018    Procedure: ESOPHAGOGASTRODUODENOSCOPY (EGD) WITH ESOPHAGEAL STENT PLACEMENT;  Surgeon: Tee Perez MD;  [facility];  Service: Thoracic    ESOPHAGOGASTRODUODENOSCOPY N/A 2/23/2018    Procedure: ESOPHAGOGASTRODUODENOSCOPY (EGD) WITH REMOVAL ESOPHAGEAL STENT  AND REPLACEMENT WITH 23mm X155mm [address];  Surgeon: Tee Perez MD;  [facility];  Service: Thoracic    ESOPHAGOGASTRODUODENOSCOPY N/A 3/28/2018    Procedure: ESOPHAGOGASTRODUODENOSCOPY (EGD) with PEJ placement ;  Surgeon: Susana Alvarado MD;  [facility]; Service: Gastroenterology    ESOPHAGOGASTRODUODENOSCOPY N/A 4/5/2018    Procedure: ESOPHAGOGASTRODUODENOSCOPY (EGD) with botox injection and kaofed placement;  Surgeon: Arlene Ruiz DO;  [facility]; Service: Gastroenterology    ESOPHAGOGASTRODUODENOSCOPY N/A 4/10/2018    Procedure: ESOPHAGOGASTRODUODENOSCOPY (EGD) with Kaofed placement;  Surgeon: Francine Aviles MD;  [facility]; Service: Gastroenterology    ESOPHAGOSCOPY WITH STENT INSERTION N/A 1/24/2018    Procedure: INSERTION STENT ESOPHAGEAL;  Surgeon: Arlene Mack MD;  [facility]; Service: Gastroenterology    EYE SURGERY Right 1980    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)     GASTRIC BYPASS  12/19/2016    Lap sleeve gastrectomy w/shunt length shortening procedure    HERNIA REPAIR      HYSTERECTOMY  03/14/2013    IR LUMBAR PUNCTURE  8/29/2018    LAPAROTOMY N/A 1/25/2018    Procedure: Exploratory Laparotomy, wash out,placement of drains, placement of NG feeding tube ;   Surgeon: Ene Bernardo DO;  [facility];  Service: General    LUMBAR PERITONEAL SHUNT  2015    Laparoscopic assisted    [place] CREATE SHUNT:VENTRIC-ATRIAL Right 2019    Procedure: IMAGE GUIDED INSERTION OF RIGHT CORONAL VENTRICULAR-ATRIAL SHUNT;  Surgeon: Anoop Ledbetter MD;  [facility];  Service: Neurosurgery    [place] CREATE SHUNT:VENTRIC-PERITONEAL Right 2017    Procedure: IMAGE GUIDED CORONAL PLACEMENT OF PROGRAMABLE VENTRICULAR-PERITONEAL SHUNT, REMOVAL OF LP SHUNT ;  Surgeon: Anoop Ledbetter MD;  [facility];  Service: Neurosurgery    [place] EGD TRANSORAL BIOPSY SINGLE/MULTIPLE N/A 2018    Procedure: ESOPHAGOGASTRODUODENOSCOPY (EGD) with padlock clip placement;  Surgeon: David Spangler MD;  [facility];   Service: [facility] CSF SHUNT,W/O REPLACE Right 2018    Procedure: Removal of  shunt;  Surgeon: Anoop Ledbetter MD;  [facility];  Service: Neurosurgery    [place] REPLACEMENT/REVISION,CSF SHUNT Right 2018    Procedure: Externalization of right-sided SHUNT VENTRICULAR-PERITONEAL in anterior chest wall ribs two and three level  ;  Surgeon: Ernestine Wright MD;  [facility];  Service: Neurosurgery    WRIST SURGERY Right     x3 ,        Social History     Tobacco Use    Smoking status: Former Smoker     Packs/day: 0 50     Years: 20 00     Pack years: 10 00     Last attempt to quit: 2012     Years since quittin 4    Smokeless tobacco: Never Used   Substance Use Topics    Alcohol use: No    Drug use: Yes     Frequency: 7 0 times per week     Types: Marijuana     Comment: medical marijuana, vaping & topical        Vitals:    20 1503   BP: 123/63   BP Location: Right arm   Patient Position: Sitting   Cuff Size: Standard   Pulse: 55   Resp: 16   Temp: (!) 97 3 °F (36 3 °C)   TempSrc: Tympanic   Weight: 60 8 kg (134 lb)   Height: 5' 3" (1 6 m)         Current Outpatient Medications:     NON FORMULARY, , Disp: , Rfl:     omeprazole (PriLOSEC) 20 mg delayed release capsule, Take 1 capsule (20 mg total) by mouth daily, Disp: 90 capsule, Rfl: 1    oxyCODONE (ROXICODONE) 5 mg immediate release tablet, Take 1 tablet (5 mg total) by mouth every 4 (four) hours as needed for moderate pain For continuing therapyMax Daily Amount: 30 mg, Disp: 10 tablet, Rfl: 0    patient supplied medication, Take 1 each by mouth daily bariatric vitamin, patient unsure of medication name, Disp: , Rfl:     acetaminophen (TYLENOL) 325 mg tablet, Take 2 tablets (650 mg total) by mouth every 4 (four) hours as needed for mild pain (Patient not taking: Reported on 1/28/2020), Disp: 30 tablet, Rfl: 0    calcium citrate-vitamin D (CITRACAL+D) 315-200 MG-UNIT per tablet, Take 1 tablet by mouth 2 (two) times a day, Disp: , Rfl:     Multiple Vitamin (MULTIVITAMIN) tablet, Take 1 tablet by mouth daily, Disp: , Rfl:     phenazopyridine (PYRIDIUM) 100 mg tablet, Take 1 tablet (100 mg total) by mouth 3 (three) times a day as needed for bladder spasms (Patient not taking: Reported on 1/28/2020), Disp: 6 tablet, Rfl: 0    scopolamine (TRANSDERM-SCOP) 1 5 mg/3 days TD 72 hr patch, Place 1 patch on the skin every third day (Patient not taking: Reported on 1/28/2020), Disp: 5 patch, Rfl: 0    spironolactone (ALDACTONE) 25 mg tablet, Take 12 5 mg by mouth daily , Disp: , Rfl:      Allergies   Allergen Reactions    Benadryl [Diphenhydramine] Anaphylaxis     Throat closing    Phenergan [Promethazine] Anaphylaxis        The following portions of the patient's history were updated by MA and reviewed by MD: allergies, current medications, past family history, past medical history, past social history, past surgical history and problem list       Physical Exam  Awake and alert  Extraocular movements are intact  Incisions are clean dry and well healed    RESULTS/DATA  I have personally reviewed pertinent films in PACS     CT scan of the brain is reviewed which demonstrates very small ventricles    Chest x-ray demonstrates the ventricular atrial catheter to be in ideal position

## 2020-01-30 DIAGNOSIS — K21.00 GASTROESOPHAGEAL REFLUX DISEASE WITH ESOPHAGITIS: ICD-10-CM

## 2020-01-30 DIAGNOSIS — Z98.84 BARIATRIC SURGERY STATUS: Primary | ICD-10-CM

## 2020-01-30 RX ORDER — OMEPRAZOLE 40 MG/1
CAPSULE, DELAYED RELEASE ORAL
Qty: 60 CAPSULE | Refills: 0 | Status: SHIPPED | OUTPATIENT
Start: 2020-01-30 | End: 2020-03-09

## 2020-02-04 ENCOUNTER — ANESTHESIA EVENT (OUTPATIENT)
Dept: GASTROENTEROLOGY | Facility: HOSPITAL | Age: 45
End: 2020-02-04

## 2020-02-05 ENCOUNTER — HOSPITAL ENCOUNTER (OUTPATIENT)
Dept: GASTROENTEROLOGY | Facility: HOSPITAL | Age: 45
Setting detail: OUTPATIENT SURGERY
Discharge: HOME | End: 2020-02-05
Attending: SURGERY
Payer: COMMERCIAL

## 2020-02-05 ENCOUNTER — ANESTHESIA (OUTPATIENT)
Dept: GASTROENTEROLOGY | Facility: HOSPITAL | Age: 45
End: 2020-02-05

## 2020-02-05 VITALS
TEMPERATURE: 97.3 F | OXYGEN SATURATION: 96 % | DIASTOLIC BLOOD PRESSURE: 61 MMHG | SYSTOLIC BLOOD PRESSURE: 125 MMHG | HEART RATE: 61 BPM | RESPIRATION RATE: 24 BRPM | BODY MASS INDEX: 23.74 KG/M2 | WEIGHT: 134 LBS | HEIGHT: 63 IN

## 2020-02-05 DIAGNOSIS — K21.9 GASTROESOPHAGEAL REFLUX DISEASE: ICD-10-CM

## 2020-02-05 PROCEDURE — 43257 EGD W/THRML TXMNT GERD: CPT | Performed by: SURGERY

## 2020-02-05 RX ORDER — ROCURONIUM BROMIDE 10 MG/ML
INJECTION, SOLUTION INTRAVENOUS AS NEEDED
Status: DISCONTINUED | OUTPATIENT
Start: 2020-02-05 | End: 2020-02-05 | Stop reason: SURG

## 2020-02-05 RX ORDER — EPHEDRINE SULFATE 50 MG/ML
INJECTION INTRAVENOUS AS NEEDED
Status: DISCONTINUED | OUTPATIENT
Start: 2020-02-05 | End: 2020-02-05 | Stop reason: SURG

## 2020-02-05 RX ORDER — SODIUM CHLORIDE 9 MG/ML
125 INJECTION, SOLUTION INTRAVENOUS CONTINUOUS
Status: DISCONTINUED | OUTPATIENT
Start: 2020-02-05 | End: 2020-02-09 | Stop reason: HOSPADM

## 2020-02-05 RX ORDER — GLYCOPYRROLATE 0.2 MG/ML
INJECTION INTRAMUSCULAR; INTRAVENOUS AS NEEDED
Status: DISCONTINUED | OUTPATIENT
Start: 2020-02-05 | End: 2020-02-05 | Stop reason: SURG

## 2020-02-05 RX ORDER — ONDANSETRON 2 MG/ML
4 INJECTION INTRAMUSCULAR; INTRAVENOUS ONCE AS NEEDED
Status: COMPLETED | OUTPATIENT
Start: 2020-02-05 | End: 2020-02-05

## 2020-02-05 RX ORDER — PROPOFOL 10 MG/ML
INJECTION, EMULSION INTRAVENOUS AS NEEDED
Status: DISCONTINUED | OUTPATIENT
Start: 2020-02-05 | End: 2020-02-05 | Stop reason: SURG

## 2020-02-05 RX ORDER — FENTANYL CITRATE 50 UG/ML
INJECTION, SOLUTION INTRAMUSCULAR; INTRAVENOUS AS NEEDED
Status: DISCONTINUED | OUTPATIENT
Start: 2020-02-05 | End: 2020-02-05 | Stop reason: SURG

## 2020-02-05 RX ORDER — ONDANSETRON 2 MG/ML
INJECTION INTRAMUSCULAR; INTRAVENOUS AS NEEDED
Status: DISCONTINUED | OUTPATIENT
Start: 2020-02-05 | End: 2020-02-05 | Stop reason: SURG

## 2020-02-05 RX ORDER — DEXAMETHASONE SODIUM PHOSPHATE 4 MG/ML
INJECTION, SOLUTION INTRA-ARTICULAR; INTRALESIONAL; INTRAMUSCULAR; INTRAVENOUS; SOFT TISSUE AS NEEDED
Status: DISCONTINUED | OUTPATIENT
Start: 2020-02-05 | End: 2020-02-05 | Stop reason: SURG

## 2020-02-05 RX ORDER — NEOSTIGMINE METHYLSULFATE 1 MG/ML
INJECTION INTRAVENOUS AS NEEDED
Status: DISCONTINUED | OUTPATIENT
Start: 2020-02-05 | End: 2020-02-05 | Stop reason: SURG

## 2020-02-05 RX ADMIN — LIDOCAINE HYDROCHLORIDE 50 MG: 20 INJECTION, SOLUTION INTRAVENOUS at 12:10

## 2020-02-05 RX ADMIN — FENTANYL CITRATE 100 MCG: 50 INJECTION, SOLUTION INTRAMUSCULAR; INTRAVENOUS at 12:10

## 2020-02-05 RX ADMIN — ROCURONIUM BROMIDE 30 MG: 50 INJECTION, SOLUTION INTRAVENOUS at 12:10

## 2020-02-05 RX ADMIN — ONDANSETRON 4 MG: 2 INJECTION INTRAMUSCULAR; INTRAVENOUS at 14:24

## 2020-02-05 RX ADMIN — EPHEDRINE SULFATE 10 MG: 50 INJECTION, SOLUTION INTRAVENOUS at 12:17

## 2020-02-05 RX ADMIN — PROPOFOL 50 MG: 10 INJECTION, EMULSION INTRAVENOUS at 12:38

## 2020-02-05 RX ADMIN — EPHEDRINE SULFATE 5 MG: 50 INJECTION, SOLUTION INTRAVENOUS at 12:29

## 2020-02-05 RX ADMIN — NEOSTIGMINE METHYLSULFATE 2.5 MG: 1 INJECTION, SOLUTION INTRAVENOUS at 12:59

## 2020-02-05 RX ADMIN — PROPOFOL 180 MG: 10 INJECTION, EMULSION INTRAVENOUS at 12:10

## 2020-02-05 RX ADMIN — GLYCOPYRROLATE 0.4 MG: 0.2 INJECTION INTRAMUSCULAR; INTRAVENOUS at 12:59

## 2020-02-05 RX ADMIN — DEXAMETHASONE SODIUM PHOSPHATE 4 MG: 4 INJECTION, SOLUTION INTRAMUSCULAR; INTRAVENOUS at 12:13

## 2020-02-05 RX ADMIN — EPHEDRINE SULFATE 10 MG: 50 INJECTION, SOLUTION INTRAVENOUS at 12:20

## 2020-02-05 RX ADMIN — SODIUM CHLORIDE 125 ML/HR: 0.9 INJECTION, SOLUTION INTRAVENOUS at 11:22

## 2020-02-05 RX ADMIN — ONDANSETRON 4 MG: 2 INJECTION INTRAMUSCULAR; INTRAVENOUS at 12:13

## 2020-02-05 NOTE — DISCHARGE INSTRUCTIONS
Follow stretta instructions  Upper Endoscopy   WHAT YOU NEED TO KNOW:   An upper endoscopy is also called an upper gastrointestinal (GI) endoscopy, or an esophagogastroduodenoscopy (EGD)  You may feel bloated, gassy, or have some abdominal discomfort after your procedure  Your throat may be sore for 24 to 36 hours  You may burp or pass gas from air that is still inside your body  DISCHARGE INSTRUCTIONS:   Call 911 if:   · You have sudden chest pain or trouble breathing  Seek care immediately if:   · You feel dizzy or faint  · You have trouble swallowing  · You have severe throat pain  · Your bowel movements are very dark or black  · Your abdomen is hard and firm and you have severe pain  · You vomit blood  Contact your healthcare provider if:   · You feel full or bloated and cannot burp or pass gas  · You have not had a bowel movement for 3 days after your procedure  · You have neck pain  · You have a fever or chills  · You have nausea or are vomiting  · You have a rash or hives  · You have questions or concerns about your endoscopy  Relieve a sore throat:  Suck on throat lozenges or crushed ice  Gargle with a small amount of warm salt water  Mix 1 teaspoon of salt and 1 cup of warm water to make salt water  Relieve gas and discomfort from bloating:  Lie on your right side with a heating pad on your abdomen  Take short walks to help pass gas  Eat small meals until bloating is relieved  Rest after your procedure:  Do not drive or make important decisions until the day after your procedure  Return to your normal activity as directed  You can usually return to work the day after your procedure  Follow up with your healthcare provider as directed:  Write down your questions so you remember to ask them during your visits  © 2017 [employer] [employer] Information is for End User's use only and may not be sold, redistributed or otherwise used for commercial purposes  All illustrations and images included in CareNotes® are the copyrighted property of A D [employer] M [employer]  or [employer]  The above information is an  only  It is not intended as medical advice for individual conditions or treatments  Talk to your doctor, nurse or pharmacist before following any medical regimen to see if it is safe and effective for you

## 2020-02-05 NOTE — ANESTHESIA PREPROCEDURE EVALUATION
Review of Systems/Medical History  Patient summary reviewed  Chart reviewed  No history of anesthetic complications     Cardiovascular    Comment: ECHO 1/ 2018--EF 88%, normal systolic fxn, no reg abnml,  Pulmonary  Smoker ex-smoker  ,        GI/Hepatic    GERD poorly controlled, Bariatric surgery (Postgastrectomy malabsorption),   Comment: proximal gastric perforation following hiatal hernia repair, s/p multiple surgical interventions including esophageal stent with removal         Kidney stones,        Endo/Other    Comment: Right eye choroidal nevus    GYN  Negative gynecology ROS          Hematology  Negative hematology ROS      Musculoskeletal  Negative musculoskeletal ROS   Arthritis     Neurology    Headaches,   Comment:    Pseudotumor cerebri (G93 2)      Papilledema (H47 10)   shunt Psychology   Depression , being treated for depression,              Physical Exam    Airway    Mallampati score: II  TM Distance: >3 FB  Neck ROM: full     Dental   No notable dental hx     Cardiovascular  Rhythm: regular, Rate: normal, Cardiovascular exam normal    Pulmonary  Pulmonary exam normal Breath sounds clear to auscultation,     Other Findings        Anesthesia Plan  ASA Score- 3     Anesthesia Type- general with ASA Monitors  Additional Monitors:   Airway Plan: ETT  Plan Factors-    Induction- intravenous  Postoperative Plan-     Informed Consent- Anesthetic plan and risks discussed with patient and spouse  I personally reviewed this patient with the CRNA  Discussed and agreed on the Anesthesia Plan with the CRNA  Yanci Hays

## 2020-02-05 NOTE — ANESTHESIA POSTPROCEDURE EVALUATION
Post-Op Assessment Note    CV Status:  Stable    Pain management: adequate     Mental Status:  Alert and awake   Hydration Status:  Euvolemic   PONV Controlled:  Controlled   Airway Patency:  Patent   Post Op Vitals Reviewed: Yes      Staff: Anesthesiologist, CRNA           BP      Temp     Pulse     Resp     SpO2

## 2020-02-05 NOTE — INTERVAL H&P NOTE
H&P reviewed  After examining the patient I find no changes in the patients condition since the H&P had been written      Vitals:    02/05/20 1102   Pulse: (!) 53   Resp: 16   Temp: (!) 97 3 °F (36 3 °C)   SpO2: 97%

## 2020-03-06 DIAGNOSIS — Z98.84 BARIATRIC SURGERY STATUS: ICD-10-CM

## 2020-03-06 DIAGNOSIS — K21.00 GASTROESOPHAGEAL REFLUX DISEASE WITH ESOPHAGITIS: ICD-10-CM

## 2020-03-09 RX ORDER — OMEPRAZOLE 40 MG/1
CAPSULE, DELAYED RELEASE ORAL
Qty: 60 CAPSULE | Refills: 0 | Status: SHIPPED | OUTPATIENT
Start: 2020-03-09 | End: 2020-04-15

## 2020-04-11 DIAGNOSIS — K21.00 GASTROESOPHAGEAL REFLUX DISEASE WITH ESOPHAGITIS: ICD-10-CM

## 2020-04-11 DIAGNOSIS — Z98.84 BARIATRIC SURGERY STATUS: ICD-10-CM

## 2020-04-15 RX ORDER — OMEPRAZOLE 40 MG/1
CAPSULE, DELAYED RELEASE ORAL
Qty: 60 CAPSULE | Refills: 0 | Status: SHIPPED | OUTPATIENT
Start: 2020-04-15 | End: 2020-05-11

## 2020-05-10 DIAGNOSIS — Z98.84 BARIATRIC SURGERY STATUS: ICD-10-CM

## 2020-05-10 DIAGNOSIS — K21.00 GASTROESOPHAGEAL REFLUX DISEASE WITH ESOPHAGITIS: ICD-10-CM

## 2020-05-11 RX ORDER — OMEPRAZOLE 40 MG/1
CAPSULE, DELAYED RELEASE ORAL
Qty: 60 CAPSULE | Refills: 0 | Status: SHIPPED | OUTPATIENT
Start: 2020-05-11 | End: 2020-06-19

## 2020-06-05 ENCOUNTER — APPOINTMENT (EMERGENCY)
Dept: CT IMAGING | Facility: HOSPITAL | Age: 45
DRG: 465 | End: 2020-06-05
Payer: COMMERCIAL

## 2020-06-05 ENCOUNTER — HOSPITAL ENCOUNTER (INPATIENT)
Facility: HOSPITAL | Age: 45
LOS: 1 days | Discharge: HOME | DRG: 465 | End: 2020-06-07
Attending: INTERNAL MEDICINE | Admitting: INTERNAL MEDICINE
Payer: COMMERCIAL

## 2020-06-05 DIAGNOSIS — N13.9 ACUTE BILATERAL OBSTRUCTIVE UROPATHY: Primary | ICD-10-CM

## 2020-06-05 LAB
ALBUMIN SERPL BCP-MCNC: 4.4 G/DL (ref 3.5–5.7)
ALP SERPL-CCNC: 54 U/L (ref 40–150)
ALT SERPL W P-5'-P-CCNC: 6 U/L (ref 7–52)
ANION GAP SERPL CALCULATED.3IONS-SCNC: 10 MMOL/L (ref 4–13)
AST SERPL W P-5'-P-CCNC: 17 U/L (ref 13–39)
BASOPHILS # BLD AUTO: 0.1 THOUSANDS/ΜL (ref 0–0.1)
BASOPHILS NFR BLD AUTO: 1 % (ref 0–2)
BILIRUB SERPL-MCNC: 0.4 MG/DL (ref 0.2–1)
BUN SERPL-MCNC: 18 MG/DL (ref 7–25)
CALCIUM SERPL-MCNC: 9.6 MG/DL (ref 8.6–10.5)
CHLORIDE SERPL-SCNC: 104 MMOL/L (ref 98–107)
CO2 SERPL-SCNC: 24 MMOL/L (ref 21–31)
CREAT SERPL-MCNC: 0.8 MG/DL (ref 0.6–1.2)
EOSINOPHIL # BLD AUTO: 0.3 THOUSAND/ΜL (ref 0–0.61)
EOSINOPHIL NFR BLD AUTO: 4 % (ref 0–5)
ERYTHROCYTE [DISTWIDTH] IN BLOOD BY AUTOMATED COUNT: 18.7 % (ref 11.5–14.5)
GFR SERPL CREATININE-BSD FRML MDRD: 89 ML/MIN/1.73SQ M
GLUCOSE SERPL-MCNC: 114 MG/DL (ref 65–99)
HCT VFR BLD AUTO: 34.5 % (ref 42–47)
HGB BLD-MCNC: 11.1 G/DL (ref 12–16)
LIPASE SERPL-CCNC: 32 U/L (ref 11–82)
LYMPHOCYTES # BLD AUTO: 1.7 THOUSANDS/ΜL (ref 0.6–4.47)
LYMPHOCYTES NFR BLD AUTO: 19 % (ref 21–51)
MCH RBC QN AUTO: 24.8 PG (ref 26–34)
MCHC RBC AUTO-ENTMCNC: 32.3 G/DL (ref 31–37)
MCV RBC AUTO: 77 FL (ref 81–99)
MONOCYTES # BLD AUTO: 0.5 THOUSAND/ΜL (ref 0.17–1.22)
MONOCYTES NFR BLD AUTO: 6 % (ref 2–12)
NEUTROPHILS # BLD AUTO: 6.2 THOUSANDS/ΜL (ref 1.4–6.5)
NEUTS SEG NFR BLD AUTO: 71 % (ref 42–75)
PLATELET # BLD AUTO: 228 THOUSANDS/UL (ref 149–390)
PMV BLD AUTO: 8.6 FL (ref 8.6–11.7)
POTASSIUM SERPL-SCNC: 3.8 MMOL/L (ref 3.5–5.5)
PROT SERPL-MCNC: 7.1 G/DL (ref 6.4–8.9)
RBC # BLD AUTO: 4.5 MILLION/UL (ref 3.9–5.2)
SODIUM SERPL-SCNC: 138 MMOL/L (ref 134–143)
WBC # BLD AUTO: 8.8 THOUSAND/UL (ref 4.8–10.8)

## 2020-06-05 PROCEDURE — 99284 EMERGENCY DEPT VISIT MOD MDM: CPT | Performed by: INTERNAL MEDICINE

## 2020-06-05 PROCEDURE — 83690 ASSAY OF LIPASE: CPT | Performed by: INTERNAL MEDICINE

## 2020-06-05 PROCEDURE — 36415 COLL VENOUS BLD VENIPUNCTURE: CPT

## 2020-06-05 PROCEDURE — 74176 CT ABD & PELVIS W/O CONTRAST: CPT

## 2020-06-05 PROCEDURE — 80053 COMPREHEN METABOLIC PANEL: CPT | Performed by: INTERNAL MEDICINE

## 2020-06-05 PROCEDURE — 96375 TX/PRO/DX INJ NEW DRUG ADDON: CPT

## 2020-06-05 PROCEDURE — 99285 EMERGENCY DEPT VISIT HI MDM: CPT

## 2020-06-05 PROCEDURE — 96374 THER/PROPH/DIAG INJ IV PUSH: CPT

## 2020-06-05 PROCEDURE — 96361 HYDRATE IV INFUSION ADD-ON: CPT

## 2020-06-05 PROCEDURE — 96376 TX/PRO/DX INJ SAME DRUG ADON: CPT

## 2020-06-05 PROCEDURE — 85025 COMPLETE CBC W/AUTO DIFF WBC: CPT | Performed by: INTERNAL MEDICINE

## 2020-06-05 RX ORDER — ONDANSETRON 2 MG/ML
4 INJECTION INTRAMUSCULAR; INTRAVENOUS ONCE
Status: COMPLETED | OUTPATIENT
Start: 2020-06-05 | End: 2020-06-05

## 2020-06-05 RX ORDER — SODIUM CHLORIDE 9 MG/ML
125 INJECTION, SOLUTION INTRAVENOUS CONTINUOUS
Status: DISCONTINUED | OUTPATIENT
Start: 2020-06-05 | End: 2020-06-06

## 2020-06-05 RX ORDER — HYDROMORPHONE HCL/PF 1 MG/ML
1 SYRINGE (ML) INJECTION ONCE
Status: COMPLETED | OUTPATIENT
Start: 2020-06-05 | End: 2020-06-05

## 2020-06-05 RX ORDER — HYDROMORPHONE HCL/PF 1 MG/ML
1 SYRINGE (ML) INJECTION ONCE
Status: COMPLETED | OUTPATIENT
Start: 2020-06-06 | End: 2020-06-05

## 2020-06-05 RX ADMIN — HYDROMORPHONE HYDROCHLORIDE 1 MG: 1 INJECTION, SOLUTION INTRAMUSCULAR; INTRAVENOUS; SUBCUTANEOUS at 23:04

## 2020-06-05 RX ADMIN — HYDROMORPHONE HYDROCHLORIDE 1 MG: 1 INJECTION, SOLUTION INTRAMUSCULAR; INTRAVENOUS; SUBCUTANEOUS at 23:57

## 2020-06-05 RX ADMIN — ONDANSETRON 4 MG: 2 INJECTION INTRAMUSCULAR; INTRAVENOUS at 23:03

## 2020-06-05 RX ADMIN — SODIUM CHLORIDE 125 ML/HR: 0.9 INJECTION, SOLUTION INTRAVENOUS at 23:03

## 2020-06-06 ENCOUNTER — ANESTHESIA EVENT (INPATIENT)
Dept: PERIOP | Facility: HOSPITAL | Age: 45
DRG: 465 | End: 2020-06-06
Payer: COMMERCIAL

## 2020-06-06 ENCOUNTER — APPOINTMENT (INPATIENT)
Dept: RADIOLOGY | Facility: HOSPITAL | Age: 45
DRG: 465 | End: 2020-06-06
Payer: COMMERCIAL

## 2020-06-06 ENCOUNTER — ANESTHESIA (INPATIENT)
Dept: PERIOP | Facility: HOSPITAL | Age: 45
DRG: 465 | End: 2020-06-06
Payer: COMMERCIAL

## 2020-06-06 PROBLEM — E78.00 HYPERCHOLESTEREMIA: Status: ACTIVE | Noted: 2017-08-11

## 2020-06-06 PROBLEM — N13.30 HYDROURETERONEPHROSIS: Status: ACTIVE | Noted: 2020-06-06

## 2020-06-06 PROBLEM — N39.46 MIXED INCONTINENCE: Status: ACTIVE | Noted: 2017-04-03

## 2020-06-06 LAB
ANION GAP SERPL CALCULATED.3IONS-SCNC: 5 MMOL/L (ref 4–13)
BACTERIA UR QL AUTO: ABNORMAL /HPF
BILIRUB UR QL STRIP: NEGATIVE
BUN SERPL-MCNC: 18 MG/DL (ref 7–25)
CALCIUM SERPL-MCNC: 9 MG/DL (ref 8.6–10.5)
CHLORIDE SERPL-SCNC: 104 MMOL/L (ref 98–107)
CLARITY UR: CLEAR
CO2 SERPL-SCNC: 28 MMOL/L (ref 21–31)
COLOR UR: YELLOW
CREAT SERPL-MCNC: 0.98 MG/DL (ref 0.6–1.2)
ERYTHROCYTE [DISTWIDTH] IN BLOOD BY AUTOMATED COUNT: 18.8 % (ref 11.5–14.5)
GFR SERPL CREATININE-BSD FRML MDRD: 70 ML/MIN/1.73SQ M
GLUCOSE SERPL-MCNC: 141 MG/DL (ref 65–140)
GLUCOSE SERPL-MCNC: 156 MG/DL (ref 65–99)
GLUCOSE UR STRIP-MCNC: NEGATIVE MG/DL
HCT VFR BLD AUTO: 33.5 % (ref 42–47)
HGB BLD-MCNC: 10.7 G/DL (ref 12–16)
HGB UR QL STRIP.AUTO: ABNORMAL
KETONES UR STRIP-MCNC: ABNORMAL MG/DL
LEUKOCYTE ESTERASE UR QL STRIP: NEGATIVE
MCH RBC QN AUTO: 24.9 PG (ref 26–34)
MCHC RBC AUTO-ENTMCNC: 32.1 G/DL (ref 31–37)
MCV RBC AUTO: 78 FL (ref 81–99)
NITRITE UR QL STRIP: NEGATIVE
NON-SQ EPI CELLS URNS QL MICRO: ABNORMAL /HPF
PH UR STRIP.AUTO: 6 [PH]
PLATELET # BLD AUTO: 210 THOUSANDS/UL (ref 149–390)
PMV BLD AUTO: 8.4 FL (ref 8.6–11.7)
POTASSIUM SERPL-SCNC: 4 MMOL/L (ref 3.5–5.5)
PROT UR STRIP-MCNC: NEGATIVE MG/DL
RBC # BLD AUTO: 4.32 MILLION/UL (ref 3.9–5.2)
RBC #/AREA URNS AUTO: ABNORMAL /HPF
SARS-COV-2 RNA RESP QL NAA+PROBE: NEGATIVE
SODIUM SERPL-SCNC: 137 MMOL/L (ref 134–143)
SP GR UR STRIP.AUTO: 1.02 (ref 1–1.03)
UROBILINOGEN UR QL STRIP.AUTO: 1 E.U./DL
WBC # BLD AUTO: 10 THOUSAND/UL (ref 4.8–10.8)
WBC #/AREA URNS AUTO: ABNORMAL /HPF

## 2020-06-06 PROCEDURE — C1769 GUIDE WIRE: HCPCS | Performed by: UROLOGY

## 2020-06-06 PROCEDURE — 81003 URINALYSIS AUTO W/O SCOPE: CPT | Performed by: PHYSICIAN ASSISTANT

## 2020-06-06 PROCEDURE — 81001 URINALYSIS AUTO W/SCOPE: CPT | Performed by: PHYSICIAN ASSISTANT

## 2020-06-06 PROCEDURE — BT141ZZ FLUOROSCOPY OF KIDNEYS, URETERS AND BLADDER USING LOW OSMOLAR CONTRAST: ICD-10-PCS | Performed by: UROLOGY

## 2020-06-06 PROCEDURE — 99223 1ST HOSP IP/OBS HIGH 75: CPT | Performed by: INTERNAL MEDICINE

## 2020-06-06 PROCEDURE — 74018 RADEX ABDOMEN 1 VIEW: CPT

## 2020-06-06 PROCEDURE — 80048 BASIC METABOLIC PNL TOTAL CA: CPT | Performed by: PHYSICIAN ASSISTANT

## 2020-06-06 PROCEDURE — 74420 UROGRAPHY RTRGR +-KUB: CPT

## 2020-06-06 PROCEDURE — C2617 STENT, NON-COR, TEM W/O DEL: HCPCS | Performed by: UROLOGY

## 2020-06-06 PROCEDURE — 87086 URINE CULTURE/COLONY COUNT: CPT | Performed by: UROLOGY

## 2020-06-06 PROCEDURE — 0T788DZ DILATION OF BILATERAL URETERS WITH INTRALUMINAL DEVICE, VIA NATURAL OR ARTIFICIAL OPENING ENDOSCOPIC: ICD-10-PCS | Performed by: UROLOGY

## 2020-06-06 PROCEDURE — 85027 COMPLETE CBC AUTOMATED: CPT | Performed by: PHYSICIAN ASSISTANT

## 2020-06-06 PROCEDURE — 96361 HYDRATE IV INFUSION ADD-ON: CPT

## 2020-06-06 PROCEDURE — 87635 SARS-COV-2 COVID-19 AMP PRB: CPT | Performed by: INTERNAL MEDICINE

## 2020-06-06 PROCEDURE — 82948 REAGENT STRIP/BLOOD GLUCOSE: CPT

## 2020-06-06 PROCEDURE — 87147 CULTURE TYPE IMMUNOLOGIC: CPT | Performed by: UROLOGY

## 2020-06-06 DEVICE — INLAY OPTIMA URETERAL STENT W/O GUIDEWIRE
Type: IMPLANTABLE DEVICE | Site: URETER | Status: NON-FUNCTIONAL
Brand: BARD® INLAY OPTIMA® URETERAL STENT
Removed: 2020-06-24

## 2020-06-06 RX ORDER — HYDROMORPHONE HCL/PF 1 MG/ML
1 SYRINGE (ML) INJECTION ONCE
Status: COMPLETED | OUTPATIENT
Start: 2020-06-06 | End: 2020-06-06

## 2020-06-06 RX ORDER — GLYCOPYRROLATE 0.2 MG/ML
INJECTION INTRAMUSCULAR; INTRAVENOUS AS NEEDED
Status: DISCONTINUED | OUTPATIENT
Start: 2020-06-06 | End: 2020-06-06 | Stop reason: SURG

## 2020-06-06 RX ORDER — NEOSTIGMINE METHYLSULFATE 1 MG/ML
INJECTION INTRAVENOUS AS NEEDED
Status: DISCONTINUED | OUTPATIENT
Start: 2020-06-06 | End: 2020-06-06 | Stop reason: SURG

## 2020-06-06 RX ORDER — TAMSULOSIN HYDROCHLORIDE 0.4 MG/1
0.4 CAPSULE ORAL
Status: DISCONTINUED | OUTPATIENT
Start: 2020-06-06 | End: 2020-06-07 | Stop reason: HOSPADM

## 2020-06-06 RX ORDER — OXYBUTYNIN CHLORIDE 5 MG/1
15 TABLET, EXTENDED RELEASE ORAL DAILY
Status: DISCONTINUED | OUTPATIENT
Start: 2020-06-06 | End: 2020-06-07 | Stop reason: HOSPADM

## 2020-06-06 RX ORDER — PANTOPRAZOLE SODIUM 40 MG/1
40 TABLET, DELAYED RELEASE ORAL
Status: DISCONTINUED | OUTPATIENT
Start: 2020-06-06 | End: 2020-06-07 | Stop reason: HOSPADM

## 2020-06-06 RX ORDER — ACETAMINOPHEN 325 MG/1
650 TABLET ORAL EVERY 6 HOURS PRN
Status: DISCONTINUED | OUTPATIENT
Start: 2020-06-06 | End: 2020-06-07 | Stop reason: HOSPADM

## 2020-06-06 RX ORDER — SODIUM CHLORIDE 9 MG/ML
100 INJECTION, SOLUTION INTRAVENOUS CONTINUOUS
Status: DISCONTINUED | OUTPATIENT
Start: 2020-06-06 | End: 2020-06-07 | Stop reason: HOSPADM

## 2020-06-06 RX ORDER — KETOROLAC TROMETHAMINE 30 MG/ML
30 INJECTION, SOLUTION INTRAMUSCULAR; INTRAVENOUS ONCE
Status: DISCONTINUED | OUTPATIENT
Start: 2020-06-06 | End: 2020-06-07 | Stop reason: HOSPADM

## 2020-06-06 RX ORDER — EPHEDRINE SULFATE 50 MG/ML
INJECTION INTRAVENOUS AS NEEDED
Status: DISCONTINUED | OUTPATIENT
Start: 2020-06-06 | End: 2020-06-06 | Stop reason: SURG

## 2020-06-06 RX ORDER — LIDOCAINE HYDROCHLORIDE 10 MG/ML
INJECTION, SOLUTION EPIDURAL; INFILTRATION; INTRACAUDAL; PERINEURAL AS NEEDED
Status: DISCONTINUED | OUTPATIENT
Start: 2020-06-06 | End: 2020-06-06 | Stop reason: SURG

## 2020-06-06 RX ORDER — B-COMPLEX WITH VITAMIN C
1 TABLET ORAL 2 TIMES DAILY WITH MEALS
Status: DISCONTINUED | OUTPATIENT
Start: 2020-06-06 | End: 2020-06-07 | Stop reason: HOSPADM

## 2020-06-06 RX ORDER — SODIUM CHLORIDE, SODIUM LACTATE, POTASSIUM CHLORIDE, CALCIUM CHLORIDE 600; 310; 30; 20 MG/100ML; MG/100ML; MG/100ML; MG/100ML
125 INJECTION, SOLUTION INTRAVENOUS CONTINUOUS
Status: DISCONTINUED | OUTPATIENT
Start: 2020-06-06 | End: 2020-06-06

## 2020-06-06 RX ORDER — MAGNESIUM HYDROXIDE 1200 MG/15ML
LIQUID ORAL AS NEEDED
Status: DISCONTINUED | OUTPATIENT
Start: 2020-06-06 | End: 2020-06-06 | Stop reason: HOSPADM

## 2020-06-06 RX ORDER — FENTANYL CITRATE/PF 50 MCG/ML
50 SYRINGE (ML) INJECTION
Status: DISCONTINUED | OUTPATIENT
Start: 2020-06-06 | End: 2020-06-06 | Stop reason: HOSPADM

## 2020-06-06 RX ORDER — LEVOFLOXACIN 5 MG/ML
500 INJECTION, SOLUTION INTRAVENOUS ONCE
Status: COMPLETED | OUTPATIENT
Start: 2020-06-06 | End: 2020-06-06

## 2020-06-06 RX ORDER — SODIUM CHLORIDE 9 MG/ML
125 INJECTION, SOLUTION INTRAVENOUS CONTINUOUS
Status: DISCONTINUED | OUTPATIENT
Start: 2020-06-06 | End: 2020-06-06

## 2020-06-06 RX ORDER — ONDANSETRON 2 MG/ML
4 INJECTION INTRAMUSCULAR; INTRAVENOUS EVERY 6 HOURS PRN
Status: DISCONTINUED | OUTPATIENT
Start: 2020-06-06 | End: 2020-06-07 | Stop reason: HOSPADM

## 2020-06-06 RX ORDER — PROPOFOL 10 MG/ML
INJECTION, EMULSION INTRAVENOUS AS NEEDED
Status: DISCONTINUED | OUTPATIENT
Start: 2020-06-06 | End: 2020-06-06 | Stop reason: SURG

## 2020-06-06 RX ORDER — SUCCINYLCHOLINE/SOD CL,ISO/PF 100 MG/5ML
SYRINGE (ML) INTRAVENOUS AS NEEDED
Status: DISCONTINUED | OUTPATIENT
Start: 2020-06-06 | End: 2020-06-06 | Stop reason: SURG

## 2020-06-06 RX ORDER — SENNOSIDES 8.6 MG
1 TABLET ORAL
Status: DISCONTINUED | OUTPATIENT
Start: 2020-06-06 | End: 2020-06-07 | Stop reason: HOSPADM

## 2020-06-06 RX ORDER — SCOLOPAMINE TRANSDERMAL SYSTEM 1 MG/1
1 PATCH, EXTENDED RELEASE TRANSDERMAL
Status: DISCONTINUED | OUTPATIENT
Start: 2020-06-06 | End: 2020-06-07 | Stop reason: HOSPADM

## 2020-06-06 RX ORDER — DEXAMETHASONE SODIUM PHOSPHATE 10 MG/ML
INJECTION, SOLUTION INTRAMUSCULAR; INTRAVENOUS AS NEEDED
Status: DISCONTINUED | OUTPATIENT
Start: 2020-06-06 | End: 2020-06-06 | Stop reason: SURG

## 2020-06-06 RX ORDER — HYDROCODONE BITARTRATE AND ACETAMINOPHEN 5; 325 MG/1; MG/1
1 TABLET ORAL EVERY 6 HOURS PRN
Status: DISCONTINUED | OUTPATIENT
Start: 2020-06-06 | End: 2020-06-07 | Stop reason: HOSPADM

## 2020-06-06 RX ORDER — FENTANYL CITRATE 50 UG/ML
INJECTION, SOLUTION INTRAMUSCULAR; INTRAVENOUS AS NEEDED
Status: DISCONTINUED | OUTPATIENT
Start: 2020-06-06 | End: 2020-06-06 | Stop reason: SURG

## 2020-06-06 RX ORDER — METOCLOPRAMIDE HYDROCHLORIDE 5 MG/ML
10 INJECTION INTRAMUSCULAR; INTRAVENOUS ONCE AS NEEDED
Status: DISCONTINUED | OUTPATIENT
Start: 2020-06-06 | End: 2020-06-06 | Stop reason: HOSPADM

## 2020-06-06 RX ORDER — ONDANSETRON 2 MG/ML
4 INJECTION INTRAMUSCULAR; INTRAVENOUS ONCE AS NEEDED
Status: DISCONTINUED | OUTPATIENT
Start: 2020-06-06 | End: 2020-06-06 | Stop reason: HOSPADM

## 2020-06-06 RX ORDER — ROCURONIUM BROMIDE 10 MG/ML
INJECTION, SOLUTION INTRAVENOUS AS NEEDED
Status: DISCONTINUED | OUTPATIENT
Start: 2020-06-06 | End: 2020-06-06 | Stop reason: SURG

## 2020-06-06 RX ORDER — DOCUSATE SODIUM 100 MG/1
100 CAPSULE, LIQUID FILLED ORAL 2 TIMES DAILY
Status: DISCONTINUED | OUTPATIENT
Start: 2020-06-06 | End: 2020-06-07 | Stop reason: HOSPADM

## 2020-06-06 RX ORDER — HYDROMORPHONE HCL/PF 1 MG/ML
0.5 SYRINGE (ML) INJECTION
Status: DISCONTINUED | OUTPATIENT
Start: 2020-06-06 | End: 2020-06-06 | Stop reason: HOSPADM

## 2020-06-06 RX ADMIN — FENTANYL CITRATE 100 MCG: 50 INJECTION INTRAMUSCULAR; INTRAVENOUS at 09:37

## 2020-06-06 RX ADMIN — ONDANSETRON 4 MG: 2 INJECTION INTRAMUSCULAR; INTRAVENOUS at 06:38

## 2020-06-06 RX ADMIN — Medication 100 MG: at 09:39

## 2020-06-06 RX ADMIN — ONDANSETRON 4 MG: 2 INJECTION INTRAMUSCULAR; INTRAVENOUS at 16:20

## 2020-06-06 RX ADMIN — MORPHINE SULFATE 2 MG: 2 INJECTION, SOLUTION INTRAMUSCULAR; INTRAVENOUS at 17:07

## 2020-06-06 RX ADMIN — MORPHINE SULFATE 2 MG: 2 INJECTION, SOLUTION INTRAMUSCULAR; INTRAVENOUS at 21:50

## 2020-06-06 RX ADMIN — EPHEDRINE SULFATE 5 MG: 50 INJECTION, SOLUTION INTRAVENOUS at 09:50

## 2020-06-06 RX ADMIN — SODIUM CHLORIDE 100 ML/HR: 0.9 INJECTION, SOLUTION INTRAVENOUS at 22:48

## 2020-06-06 RX ADMIN — EPHEDRINE SULFATE 10 MG: 50 INJECTION, SOLUTION INTRAVENOUS at 09:54

## 2020-06-06 RX ADMIN — SODIUM CHLORIDE 125 ML/HR: 0.9 INJECTION, SOLUTION INTRAVENOUS at 08:31

## 2020-06-06 RX ADMIN — ONDANSETRON 4 MG: 2 INJECTION INTRAMUSCULAR; INTRAVENOUS at 21:50

## 2020-06-06 RX ADMIN — ROCURONIUM BROMIDE 10 MG: 10 INJECTION INTRAVENOUS at 09:44

## 2020-06-06 RX ADMIN — LIDOCAINE HYDROCHLORIDE 50 MG: 10 INJECTION, SOLUTION EPIDURAL; INFILTRATION; INTRACAUDAL; PERINEURAL at 09:38

## 2020-06-06 RX ADMIN — MORPHINE SULFATE 2 MG: 2 INJECTION, SOLUTION INTRAMUSCULAR; INTRAVENOUS at 08:37

## 2020-06-06 RX ADMIN — DEXAMETHASONE SODIUM PHOSPHATE 4 MG: 10 INJECTION, SOLUTION INTRAMUSCULAR; INTRAVENOUS at 09:49

## 2020-06-06 RX ADMIN — ONDANSETRON 4 MG: 2 INJECTION INTRAMUSCULAR; INTRAVENOUS at 10:14

## 2020-06-06 RX ADMIN — MORPHINE SULFATE 2 MG: 2 INJECTION, SOLUTION INTRAMUSCULAR; INTRAVENOUS at 13:07

## 2020-06-06 RX ADMIN — EPHEDRINE SULFATE 10 MG: 50 INJECTION, SOLUTION INTRAVENOUS at 09:47

## 2020-06-06 RX ADMIN — SENNOSIDES 8.6 MG: 8.6 TABLET, FILM COATED ORAL at 16:20

## 2020-06-06 RX ADMIN — PROPOFOL 100 MG: 10 INJECTION, EMULSION INTRAVENOUS at 09:38

## 2020-06-06 RX ADMIN — HYDROMORPHONE HYDROCHLORIDE 1 MG: 1 INJECTION, SOLUTION INTRAMUSCULAR; INTRAVENOUS; SUBCUTANEOUS at 02:37

## 2020-06-06 RX ADMIN — TAMSULOSIN HYDROCHLORIDE 0.4 MG: 0.4 CAPSULE ORAL at 06:32

## 2020-06-06 RX ADMIN — OXYBUTYNIN CHLORIDE 15 MG: 5 TABLET, EXTENDED RELEASE ORAL at 15:20

## 2020-06-06 RX ADMIN — GLYCOPYRROLATE 0.4 MG: 0.2 INJECTION, SOLUTION INTRAMUSCULAR; INTRAVENOUS at 10:12

## 2020-06-06 RX ADMIN — DOCUSATE SODIUM 100 MG: 100 CAPSULE, LIQUID FILLED ORAL at 17:04

## 2020-06-06 RX ADMIN — SENNOSIDES 8.6 MG: 8.6 TABLET, FILM COATED ORAL at 21:52

## 2020-06-06 RX ADMIN — LEVOFLOXACIN 500 MG: 5 INJECTION, SOLUTION INTRAVENOUS at 09:30

## 2020-06-06 RX ADMIN — SCOPALAMINE 1 PATCH: 1 PATCH, EXTENDED RELEASE TRANSDERMAL at 00:29

## 2020-06-06 RX ADMIN — LIDOCAINE HYDROCHLORIDE 100 MG: 20 INJECTION INTRAVENOUS at 04:42

## 2020-06-06 RX ADMIN — HYDROMORPHONE HYDROCHLORIDE 1 MG: 1 INJECTION, SOLUTION INTRAMUSCULAR; INTRAVENOUS; SUBCUTANEOUS at 06:31

## 2020-06-06 RX ADMIN — PANTOPRAZOLE SODIUM 40 MG: 40 TABLET, DELAYED RELEASE ORAL at 16:20

## 2020-06-06 RX ADMIN — NEOSTIGMINE METHYLSULFATE 3 MG: 1 INJECTION, SOLUTION INTRAVENOUS at 10:12

## 2020-06-06 NOTE — CONSULTS
Consultation - Urology   Theresa Galvan 38 y o  female MRN: [id number]  Unit/Bed#: OR POOL Encounter: [id number]      Assessment/Plan      Assessment:  Bilateral proximal ureteral stones with right renal colic  Plan:  Options, procedures, benefits and risks were discussed and she agreed to the planned procedure which will include cystoscopy, bilateral retrograde urogram, and insertion of bilateral ureteral stents  She understands that the stones will not be removed at this time and will require additional procedures in the future  History of Present Illness   Attending: Dylan Olivas MD  Reason for Consult / Principal Problem:  Right renal colic  HPI: Theresa Galvan is a 38y o  year old female who presents with a 2 week history of intermittent right-sided back and abdominal pain  This was associated with nausea and vomiting  She denied any fever or chills  She denied any voiding symptoms such as dysuria or hematuria  The pain became much worse yesterday and she went to the emergency room late last night  CT scan shows bilateral proximal 7 mm stones at the L3 vertebral level upon my review  There is also a 3 mm stone at the right L4 vertebral level  There is moderate right hydronephrosis  There is only left hydroureter above the left ureteral stone with no meena hydronephrosis  She does have a history of significant stone disease in the past   She underwent a procedure in 2003 which was likely ureteroscopic stone extraction  She then was seen by [facility] urology over the past 2 years for known renal stones that were managed conservatively  The patient states that her stones were not seen on plain x-ray and were likely uric acid  KUB x-ray this morning showed no evidence of the ureteral stones or kidney stones  Her renal function was normal on laboratory studies from the emergency room  Consults    Review of Systems   Gastrointestinal: Positive for abdominal pain   Negative for abdominal distention  Genitourinary: Positive for flank pain  Negative for difficulty urinating, dysuria, frequency and hematuria  Historical Information   Past Medical History:   Diagnosis Date    Abdominal pain     Brain condition     Pseudotumor Cerebri     Chronic kidney disease     De Quervain's disease (tenosynovitis)     Depression 1/10/2019    Esophageal perforation     Gastric leak     GERD (gastroesophageal reflux disease)     Idiopathic intracranial hypertension     Kidney stone     Migraine     No blood products     per pt: personal and Zoroastrianism beliefs  surgeon office aware 12/13/19    Obesity     Papilledema, both eyes     Postgastrectomy malabsorption     Presence of lumboperitoneal shunt     Resolved: Sep 20, 2017    Rotator cuff tendinitis     Resolved: Aug 23, 2017    Visual field defect      Past Surgical History:   Procedure Laterality Date    CSF SHUNT      LP shunt - 2015 -  shunt - 2017    ESOPHAGOGASTRODUODENOSCOPY N/A 2/23/2018    Procedure: ESOPHAGOGASTRODUODENOSCOPY (EGD) WITH ESOPHAGEAL STENT PLACEMENT;  Surgeon: Janes Campuzano MD;  [facility];  Service: Thoracic    ESOPHAGOGASTRODUODENOSCOPY N/A 2/23/2018    Procedure: ESOPHAGOGASTRODUODENOSCOPY (EGD) WITH REMOVAL ESOPHAGEAL STENT  AND REPLACEMENT WITH 23mm X155mm [address];  Surgeon: Janes Campuzano MD;  [facility];  Service: Thoracic    ESOPHAGOGASTRODUODENOSCOPY N/A 3/28/2018    Procedure: ESOPHAGOGASTRODUODENOSCOPY (EGD) with PEJ placement ;  Surgeon: Juan A Lo MD;  [facility]; Service: Gastroenterology    ESOPHAGOGASTRODUODENOSCOPY N/A 4/5/2018    Procedure: ESOPHAGOGASTRODUODENOSCOPY (EGD) with botox injection and kaofed placement;  Surgeon: Ninfa Sanford DO;  [facility];   Service: Gastroenterology    ESOPHAGOGASTRODUODENOSCOPY N/A 4/10/2018    Procedure: ESOPHAGOGASTRODUODENOSCOPY (EGD) with Dorethia Ramin placement;  Surgeon: Clarisse Mendoza MD; [facility]; Service: Gastroenterology    ESOPHAGOSCOPY WITH STENT INSERTION N/A 1/24/2018    Procedure: INSERTION STENT ESOPHAGEAL;  Surgeon: Thais Kelley MD;  [facility]; Service: Gastroenterology    EYE SURGERY Right 1980    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)     GASTRIC BYPASS  12/19/2016    Lap sleeve gastrectomy w/shunt length shortening procedure    HERNIA REPAIR      HYSTERECTOMY  03/14/2013    IR LUMBAR PUNCTURE  8/29/2018    LAPAROTOMY N/A 1/25/2018    Procedure: Exploratory Laparotomy, wash out,placement of drains, placement of NG feeding tube ; Surgeon: Justin Barragan DO;  [facility];  Service: General    LUMBAR PERITONEAL SHUNT  11/19/2015    Laparoscopic assisted    [place] CREATE SHUNT:VENTRIC-ATRIAL Right 12/18/2019    Procedure: IMAGE GUIDED INSERTION OF RIGHT CORONAL VENTRICULAR-ATRIAL SHUNT;  Surgeon: Ivis Gu MD;  [facility];  Service: Neurosurgery    [place] CREATE SHUNT:VENTRIC-PERITONEAL Right 5/31/2017    Procedure: IMAGE GUIDED CORONAL PLACEMENT OF PROGRAMABLE VENTRICULAR-PERITONEAL SHUNT, REMOVAL OF LP SHUNT ;  Surgeon: Ivis Gu MD;  [facility];  Service: Neurosurgery    [place] EGD TRANSORAL BIOPSY SINGLE/MULTIPLE N/A 6/27/2018    Procedure: ESOPHAGOGASTRODUODENOSCOPY (EGD) with padlock clip placement;  Surgeon: Ramin Bah MD;  [facility];   Service: Thana Poke CSF SHUNT,W/O REPLACE Right 2/5/2018    Procedure: Removal of  shunt;  Surgeon: Ivis Gu MD;  [facility];  Service: Neurosurgery    [place] REPLACEMENT/REVISION,CSF SHUNT Right 1/25/2018    Procedure: Externalization of right-sided SHUNT VENTRICULAR-PERITONEAL in anterior chest wall ribs two and three level  ;  Surgeon: Jose Meyer MD;  [facility];  Service: Neurosurgery    WRIST SURGERY Right     x3 2006, 2008     Social History   Social History     Substance and Sexual Activity Alcohol Use Never    Frequency: Never    Binge frequency: Never     @DRUGHX  E-Cigarette/Vaping     E-Cigarette/Vaping Substances     Social History     Tobacco Use   Smoking Status Former Smoker    Packs/day: 0 50    Years: 20 00    Pack years: 10 00    Last attempt to quit: 2012    Years since quittin 7   Smokeless Tobacco Never Used     Family History: non-contributory    Meds/Allergies   current meds:   Current Facility-Administered Medications   Medication Dose Route Frequency    [MAR Hold] acetaminophen (TYLENOL) tablet 650 mg  650 mg Oral Q6H PRN    calcium carbonate-vitamin D (OSCAL-D) 500 mg-200 units per tablet 1 tablet  1 tablet Oral BID With Meals    HYDROcodone-acetaminophen (NORCO) 5-325 mg per tablet 1 tablet  1 tablet Oral Q6H PRN    [MAR Hold] ketorolac (TORADOL) injection 30 mg  30 mg Intravenous Once    [MAR Hold] levofloxacin (LEVAQUIN) IVPB (premix) 500 mg 100 mL  500 mg Intravenous Once    morphine injection 2 mg  2 mg Intravenous Q4H PRN    [MAR Hold] multivitamin-minerals (CENTRUM) tablet 1 tablet  1 tablet Oral Daily    [MAR Hold] ondansetron (ZOFRAN) injection 4 mg  4 mg Intravenous Q6H PRN    pantoprazole (PROTONIX) EC tablet 40 mg  40 mg Oral BID AC    [MAR Hold] scopolamine (TRANSDERM-SCOP) 1 5 mg/3 days TD 72 hr patch 1 patch  1 patch Transdermal Q72H    sodium chloride 0 9 % infusion  125 mL/hr Intravenous Continuous    sodium chloride 0 9 % infusion  125 mL/hr Intravenous Continuous    sterile water irrigation solution    PRN    [MAR Hold] tamsulosin (FLOMAX) capsule 0 4 mg  0 4 mg Oral Daily With Dinner     Allergies   Allergen Reactions    Benadryl [Diphenhydramine] Anaphylaxis     Throat closing    Phenergan [Promethazine] Anaphylaxis       Objective   Vitals: Blood pressure 138/67, pulse 56, temperature 98 6 °F (37 °C), temperature source Temporal, resp   rate 16, height 5' 4" (1 626 m), weight 61 2 kg (135 lb), SpO2 100 %, not currently breastfeeding  I/O last 24 hours: In: 0   Out: 200 [Emesis/NG output:200]    Invasive Devices     Peripheral Intravenous Line            Peripheral IV 06/05/20 Left Hand less than 1 day                Physical Exam   Abdominal: She exhibits no distension  There is tenderness  Right flank and right upper quadrant tenderness    Lab Results:   CBC:   Lab Results   Component Value Date    WBC 10 00 06/06/2020    HGB 10 7 (L) 06/06/2020    HCT 33 5 (L) 06/06/2020    MCV 78 (L) 06/06/2020     06/06/2020    MCH 24 9 (L) 06/06/2020    MCHC 32 1 06/06/2020    RDW 18 8 (H) 06/06/2020    MPV 8 4 (L) 06/06/2020     CMP:   Lab Results   Component Value Date    SODIUM 138 06/05/2020     06/05/2020    CO2 24 06/05/2020    BUN 18 06/05/2020    CREATININE 0 80 06/05/2020    CALCIUM 9 6 06/05/2020    AST 17 06/05/2020    ALT 6 (L) 06/05/2020    ALKPHOS 54 06/05/2020    EGFR 89 06/05/2020     Urinalysis: No results found for: Harlin Lites, SPECGRAV, PHUR, LEUKOCYTESUR, NITRITE, PROTEINUA, GLUCOSEU, KETONESU, BILIRUBINUR, BLOODU  Imaging Studies: I have personally reviewed pertinent reports  EKG, Pathology, and Other Studies: I have personally reviewed pertinent reports  VTE Prophylaxis: Sequential compression device (Venodyne)     Code Status: Level 1 - Full Code  Advance Directive and Living Will:      Power of :    POLST:      Counseling / Coordination of Care  Total floor / unit time spent today 45 minutes  Greater than 50% of total time was spent with the patient and / or family counseling and / or coordination of care   A description of the counseling / coordination of care:  I have discussed the case with the hospitalist

## 2020-06-06 NOTE — NURSING NOTE
Pt sitting in bathroom for over an hour RT keeping pressure off her vagina, denies pain alleviation from analgesia to her urethra, burning  Faces scale 4/10  Denies lightheadedness or dizziness, continues with nausea and dry heaves  Daughter with pt  Dirk Sale, Denies needs, refuses to sit in chair  Personal needs within reach, refuses to go back to bed, education provided to pt about safety  Will continue to monitor

## 2020-06-06 NOTE — PLAN OF CARE
Problem: Potential for Falls  Goal: Patient will remain free of falls  Description  INTERVENTIONS:  - Assess patient frequently for physical needs  -  Identify cognitive and physical deficits and behaviors that affect risk of falls    -  [place] fall precautions as indicated by assessment   - Educate patient/family on patient safety including physical limitations  - Instruct patient to call for assistance with activity based on assessment  - Modify environment to reduce risk of injury  - Consider OT/PT consult to assist with strengthening/mobility  Outcome: Progressing     Problem: PAIN - ADULT  Goal: Verbalizes/displays adequate comfort level or baseline comfort level  Description  Interventions:  - Encourage patient to monitor pain and request assistance  - Assess pain using appropriate pain scale  - Administer analgesics based on type and severity of pain and evaluate response  - Implement non-pharmacological measures as appropriate and evaluate response  - Consider cultural and social influences on pain and pain management  - Notify physician/advanced practitioner if interventions unsuccessful or patient reports new pain  Outcome: Progressing     Problem: INFECTION - ADULT  Goal: Absence or prevention of progression during hospitalization  Description  INTERVENTIONS:  - Assess and monitor for signs and symptoms of infection  - Monitor lab/diagnostic results  - Monitor all insertion sites, i e  indwelling lines, tubes, and drains  - Monitor endotracheal if appropriate and nasal secretions for changes in amount and color  - [place] appropriate cooling/warming therapies per order  - Administer medications as ordered  - Instruct and encourage patient and family to use good hand hygiene technique  - Identify and instruct in appropriate isolation precautions for identified infection/condition  Outcome: Progressing  Goal: Absence of fever/infection during neutropenic period  Description  INTERVENTIONS:  - Monitor WBC    Outcome: Progressing     Problem: SAFETY ADULT  Goal: Maintain or return to baseline ADL function  Description  INTERVENTIONS:  -  Assess patient's ability to carry out ADLs; assess patient's baseline for ADL function and identify physical deficits which impact ability to perform ADLs (bathing, care of mouth/teeth, toileting, grooming, dressing, etc )  - Assess/evaluate cause of self-care deficits   - Assess range of motion  - Assess patient's mobility; develop plan if impaired  - Assess patient's need for assistive devices and provide as appropriate  - Encourage maximum independence but intervene and supervise when necessary  - Involve family in performance of ADLs  - Assess for home care needs following discharge   - Consider OT consult to assist with ADL evaluation and planning for discharge  - Provide patient education as appropriate  Outcome: Progressing  Goal: Maintain or return mobility status to optimal level  Description  INTERVENTIONS:  - Assess patient's baseline mobility status (ambulation, transfers, stairs, etc )    - Identify cognitive and physical deficits and behaviors that affect mobility  - Identify mobility aids required to assist with transfers and/or ambulation (gait belt, sit-to-stand, lift, walker, cane, etc )  - [place] fall precautions as indicated by assessment  - Record patient progress and toleration of activity level on Mobility SBAR; progress patient to next Phase/Stage  - Instruct patient to call for assistance with activity based on assessment  - Consider rehabilitation consult to assist with strengthening/weightbearing, etc   Outcome: Progressing     Problem: DISCHARGE PLANNING  Goal: Discharge to home or other facility with appropriate resources  Description  INTERVENTIONS:  - Identify barriers to discharge w/patient and caregiver  - Arrange for needed discharge resources and transportation as appropriate  - Identify discharge learning needs (meds, wound care, etc )    - Refer to Case Management Department for coordinating discharge planning if the patient needs post-hospital services based on physician/advanced practitioner order or complex needs related to functional status, cognitive ability, or social support system   Outcome: Progressing     Problem: Knowledge Deficit  Goal: Patient/family/caregiver demonstrates understanding of disease process, treatment plan, medications, and discharge instructions  Description  Complete learning assessment and assess knowledge base    Interventions:  - Provide teaching at level of understanding  - Provide teaching via preferred learning methods  Outcome: Progressing     Problem: GASTROINTESTINAL - ADULT  Goal: Minimal or absence of nausea and/or vomiting  Description  INTERVENTIONS:  - Administer IV fluids if ordered to ensure adequate hydration  - Maintain NPO status until nausea and vomiting are resolved  - Nasogastric tube if ordered  - Administer ordered antiemetic medications as needed  - Provide nonpharmacologic comfort measures as appropriate  - Advance diet as tolerated, if ordered  - Consider nutrition services referral to assist patient with adequate nutrition and appropriate food choices  Outcome: Progressing  Goal: Maintains or returns to baseline bowel function  Description  INTERVENTIONS:  - Assess bowel function  - Encourage oral fluids to ensure adequate hydration  - Administer IV fluids if ordered to ensure adequate hydration  - Administer ordered medications as needed  - Encourage mobilization and activity  - Consider nutritional services referral to assist patient with adequate nutrition and appropriate food choices  Outcome: Progressing  Goal: Maintains adequate nutritional intake  Description  INTERVENTIONS:  - Monitor percentage of each meal consumed  - Identify factors contributing to decreased intake, treat as appropriate  - Assist with meals as needed  - Monitor I&O, weight, and lab values if indicated  - Obtain nutrition services referral as needed  Outcome: Progressing

## 2020-06-06 NOTE — ED NOTES
Pt requesting more pain medication  Offered toradol, pt stated "it doenst work on me  Toradol, tramadol and morphine makes me vomit"   This was reported to dr Johnnie Ohara, RN  06/06/20 [id number]

## 2020-06-06 NOTE — ASSESSMENT & PLAN NOTE
· Admit to Medicine  · Make NPO  · Give Zofran and pain control p r n  · Consult Urology in a m    · Obtain UA

## 2020-06-06 NOTE — ED PROVIDER NOTES
History  Chief Complaint   Patient presents with    Abdominal Pain     RLQ pain started 2 hours PTA  pt  has had 6 episodes of emesis since then  Pt  arrives via EMS dry heaving c/o sharp stabbing abdominal pain       44-year-old female accompanied by her  presents with intractable nausea vomiting right lower quadrant pain and right flank pain  Patient has a history of her current kidney stones  She states she was in her normal state health to about 7:00 p m  Today and she started having this abdominal pain mostly in the right lower quadrant now wrapping around to her right flank  She rates the pain as 10/10 and requesting Dilaudid  She states Toradol does not work and morphine upset stomach  She dies denies any dysuria pyuria frequency urgency  She denies fever chills or rigors          Prior to Admission Medications   Prescriptions Last Dose Informant Patient Reported? Taking?    Multiple Vitamin (MULTIVITAMIN) tablet 6/5/2020 at Unknown time Self Yes Yes   Sig: Take 1 tablet by mouth daily   NON FORMULARY 6/5/2020 at Unknown time Self Yes Yes   acetaminophen (TYLENOL) 325 mg tablet  Self No No   Sig: Take 2 tablets (650 mg total) by mouth every 4 (four) hours as needed for mild pain   Patient not taking: Reported on 1/28/2020   calcium citrate-vitamin D (CITRACAL+D) 315-200 MG-UNIT per tablet 6/5/2020 at Unknown time Self Yes Yes   Sig: Take 1 tablet by mouth 2 (two) times a day   omeprazole (PriLOSEC) 40 MG capsule 6/5/2020 at 2100  No Yes   Sig: TAKE ONE CAPSULE BY MOUTH TWICE DAILY   oxyCODONE (ROXICODONE) 5 mg immediate release tablet Not Taking at Unknown time Self No No   Sig: Take 1 tablet (5 mg total) by mouth every 4 (four) hours as needed for moderate pain For continuing therapyMax Daily Amount: 30 mg   Patient not taking: Reported on 6/5/2020   patient supplied medication 6/5/2020 at Unknown time Self Yes Yes   Sig: Take 1 each by mouth daily bariatric vitamin, patient unsure of medication name   phenazopyridine (PYRIDIUM) 100 mg tablet Not Taking at Unknown time Self No No   Sig: Take 1 tablet (100 mg total) by mouth 3 (three) times a day as needed for bladder spasms   Patient not taking: Reported on 1/28/2020   scopolamine (TRANSDERM-SCOP) 1 5 mg/3 days TD 72 hr patch Not Taking at Unknown time Self No No   Sig: Place 1 patch on the skin every third day   Patient not taking: Reported on 1/28/2020      Facility-Administered Medications: None       Past Medical History:   Diagnosis Date    Abdominal pain     Brain condition     Pseudotumor Cerebri     Chronic kidney disease     De Quervain's disease (tenosynovitis)     Depression 1/10/2019    Esophageal perforation     Gastric leak     GERD (gastroesophageal reflux disease)     Idiopathic intracranial hypertension     Kidney stone     Migraine     No blood products     per pt: personal and L[employer] beliefs  surgeon office aware 12/13/19    Obesity     Papilledema, both eyes     Postgastrectomy malabsorption     Presence of lumboperitoneal shunt     Resolved: Sep 20, 2017    Rotator cuff tendinitis     Resolved: Aug 23, 2017    Visual field defect        Past Surgical History:   Procedure Laterality Date    CSF SHUNT      LP shunt - 2015 -  shunt - 2017    ESOPHAGOGASTRODUODENOSCOPY N/A 2/23/2018    Procedure: ESOPHAGOGASTRODUODENOSCOPY (EGD) WITH ESOPHAGEAL STENT PLACEMENT;  Surgeon: Ese Elise MD;  [facility];  Service: Thoracic    ESOPHAGOGASTRODUODENOSCOPY N/A 2/23/2018    Procedure: ESOPHAGOGASTRODUODENOSCOPY (EGD) WITH REMOVAL ESOPHAGEAL STENT  AND REPLACEMENT WITH 23mm X155mm 1111 [address] [place] [address],[address];  Surgeon: Ese Elise MD;  [facility];  Service: Thoracic    ESOPHAGOGASTRODUODENOSCOPY N/A 3/28/2018    Procedure: ESOPHAGOGASTRODUODENOSCOPY (EGD) with PEJ placement ;  Surgeon: Renu Jeffery MD;  [facility];   Service: Gastroenterology    ESOPHAGOGASTRODUODENOSCOPY N/A 4/5/2018 Procedure: ESOPHAGOGASTRODUODENOSCOPY (EGD) with botox injection and kaofed placement;  Surgeon: Lieutenant Daniella DO;  [facility]; Service: Gastroenterology    ESOPHAGOGASTRODUODENOSCOPY N/A 4/10/2018    Procedure: ESOPHAGOGASTRODUODENOSCOPY (EGD) with Kaofed placement;  Surgeon: Sabrina Murphy MD;  [facility]; Service: Gastroenterology    ESOPHAGOSCOPY WITH STENT INSERTION N/A 1/24/2018    Procedure: INSERTION STENT ESOPHAGEAL;  Surgeon: Ela Galloway MD;  [facility]; Service: Gastroenterology    EYE SURGERY Right 1980    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)     GASTRIC BYPASS  12/19/2016    Lap sleeve gastrectomy w/shunt length shortening procedure    HERNIA REPAIR      HYSTERECTOMY  03/14/2013    IR LUMBAR PUNCTURE  8/29/2018    LAPAROTOMY N/A 1/25/2018    Procedure: Exploratory Laparotomy, wash out,placement of drains, placement of NG feeding tube ; Surgeon: Ravinder Price DO;  [facility];  Service: General    LUMBAR PERITONEAL SHUNT  11/19/2015    Laparoscopic assisted    MD CREATE SHUNT:VENTRIC-ATRIAL Right 12/18/2019    Procedure: IMAGE GUIDED INSERTION OF RIGHT CORONAL VENTRICULAR-ATRIAL SHUNT;  Surgeon: Hafsa Swanson MD;  [facility];  Service: Neurosurgery    MD CREATE SHUNT:VENTRIC-PERITONEAL Right 5/31/2017    Procedure: IMAGE GUIDED CORONAL PLACEMENT OF PROGRAMABLE VENTRICULAR-PERITONEAL SHUNT, REMOVAL OF LP SHUNT ;  Surgeon: Hafsa Swanson MD;  [facility];  Service: Neurosurgery    MD CYSTOURETHROSCOPY,URETER CATHETER N/A 6/6/2020    Procedure: Bilateral CYSTOSCOPY RETROGRADE PYELOGRAM WITH INSERTION STENT URETERAL;  Surgeon: Marifer Galvez MD;  [facility];  Service: Urology    MD EGD TRANSORAL BIOPSY SINGLE/MULTIPLE N/A 6/27/2018    Procedure: ESOPHAGOGASTRODUODENOSCOPY (EGD) with padlock clip placement;  Surgeon: Stephen Cruz MD;  [facility];   Service: Rekha Saver CSF SHUNT,W/O REPLACE Right 2018    Procedure: Removal of  shunt;  Surgeon: Kaila Zuñiga MD;  [facility];  Service: Neurosurgery    CA REPLACEMENT/REVISION,CSF SHUNT Right 2018    Procedure: Externalization of right-sided SHUNT VENTRICULAR-PERITONEAL in anterior chest wall ribs two and three level  ;  Surgeon: Lashell Hermosillo MD;  [facility];  Service: Neurosurgery    WRIST SURGERY Right     x3 2006,        Family History   Problem Relation Age of Onset    No Known Problems Mother     No Known Problems Father     Thyroid cancer Brother     Colon cancer Maternal Grandfather     Cancer Paternal Grandmother     Cancer Paternal Grandfather     Cancer Family         Gastric    Leukemia Family     Colon cancer Family     Pancreatic cancer Family      I have reviewed and agree with the history as documented  E-Cigarette/Vaping     E-Cigarette/Vaping Substances     Social History     Tobacco Use    Smoking status: Former Smoker     Packs/day: 0 50     Years: 20 00     Pack years: 10 00     Last attempt to quit: 2012     Years since quittin 8    Smokeless tobacco: Never Used   Substance Use Topics    Alcohol use: Never     Frequency: Never     Binge frequency: Never    Drug use: Yes     Frequency: 7 0 times per week     Types: Marijuana     Comment: medical marijuana, vaping & topical        Review of Systems   Constitutional: Negative  Respiratory: Negative  Cardiovascular: Negative  Gastrointestinal: Positive for abdominal pain, nausea and vomiting  Genitourinary: Negative  Skin: Negative  Neurological: Negative  Hematological: Negative  Psychiatric/Behavioral: Negative  Physical Exam  Physical Exam   Constitutional: She is oriented to person, place, and time  She appears well-developed and well-nourished  She appears ill  HENT:   Head: Normocephalic and atraumatic  Eyes: Pupils are equal, round, and reactive to light   EOM are normal    Cardiovascular: Normal rate and regular rhythm  Pulmonary/Chest: Effort normal and breath sounds normal    Abdominal: Soft  Normal appearance  There is tenderness in the right lower quadrant  There is CVA tenderness  Neurological: She is alert and oriented to person, place, and time  Skin: Skin is warm and dry  Psychiatric: She has a normal mood and affect  Nursing note and vitals reviewed        Vital Signs  ED Triage Vitals   Temperature Pulse Respirations Blood Pressure SpO2   06/06/20 0004 06/05/20 2208 06/05/20 2208 06/05/20 2208 06/05/20 2208   98 8 °F (37 1 °C) 60 18 126/69 100 %      Temp Source Heart Rate Source Patient Position - Orthostatic VS BP Location FiO2 (%)   06/06/20 0004 06/06/20 0758 06/06/20 0758 06/06/20 0758 --   Temporal Monitor Lying Right arm       Pain Score       06/05/20 2208       Worst Possible Pain           Vitals:    06/06/20 1503 06/06/20 1921 06/07/20 0036 06/07/20 0817   BP: 126/59 120/61 123/58 131/60   Pulse: 60 75 68 63   Patient Position - Orthostatic VS: Lying Lying Lying Lying         Visual Acuity      ED Medications  Medications   HYDROmorphone (DILAUDID) injection 1 mg (1 mg Intravenous Given 6/5/20 2304)   ondansetron (ZOFRAN) injection 4 mg (4 mg Intravenous Given 6/5/20 2303)   HYDROmorphone (DILAUDID) injection 1 mg (1 mg Intravenous Given 6/5/20 2357)   HYDROmorphone (DILAUDID) injection 1 mg (1 mg Intravenous Given 6/6/20 0237)   lidocaine (cardiac) injection 100 mg (100 mg Intravenous Given 6/6/20 0442)   HYDROmorphone (DILAUDID) injection 1 mg (1 mg Intravenous Given 6/6/20 0631)   levofloxacin (LEVAQUIN) IVPB (premix) 500 mg 100 mL (500 mg Intravenous New Bag 6/6/20 0930)       Diagnostic Studies  Results Reviewed     Procedure Component Value Units Date/Time    Novel Coronavirus [facility] [[id number]]  (Normal) Collected:  06/06/20 0240    Lab Status:  Final result Specimen:  Nasopharyngeal Swab Updated:  06/06/20 1899 SARS-CoV-2 Negative    Narrative: The specimen collection materials, transport medium, and/or testing methodology utilized in the production of these test results have been proven to be reliable in a limited validation with an abbreviated program under the Emergency Utilization Authorization provided by the FDA  Testing reported as "Presumptive positive" will be confirmed with secondary testing with a reference laboratory to ensure result accuracy  Clinical caution and judgement should be used with the interpretation of these results with consideration of the clinical impression and other laboratory testing  Testing reported as "Positive" or "Negative" has been proven to be accurate according to standard laboratory validation requirements  All testing is performed with control materials showing appropriate reactivity at standard intervals        Lipase [[id number]]  (Normal) Collected:  06/05/20 2225    Lab Status:  Final result Specimen:  Blood from Hand, Left Updated:  06/05/20 2257     Lipase 32 u/L     Comprehensive metabolic panel [[id number]]  (Abnormal) Collected:  06/05/20 2225    Lab Status:  Final result Specimen:  Blood from Hand, Left Updated:  06/05/20 2257     Sodium 138 mmol/L      Potassium 3 8 mmol/L      Chloride 104 mmol/L      CO2 24 mmol/L      ANION GAP 10 mmol/L      BUN 18 mg/dL      Creatinine 0 80 mg/dL      Glucose 114 mg/dL      Calcium 9 6 mg/dL      AST 17 U/L      ALT 6 U/L      Alkaline Phosphatase 54 U/L      Total Protein 7 1 g/dL      Albumin 4 4 g/dL      Total Bilirubin 0 40 mg/dL      eGFR 89 ml/min/1 73sq m     Narrative:       [place] guidelines for Chronic Kidney Disease (CKD):     Stage 1 with normal or high GFR (GFR > 90 mL/min/1 73 square meters)    Stage 2 Mild CKD (GFR = 60-89 mL/min/1 73 square meters)    Stage 3A Moderate CKD (GFR = 45-59 mL/min/1 73 square meters)    Stage 3B Moderate CKD (GFR = 30-44 mL/min/1 73 square meters)   Stage 4 Severe CKD (GFR = 15-29 mL/min/1 73 square meters)    Stage 5 End Stage CKD (GFR <15 mL/min/1 73 square meters)  Note: GFR calculation is accurate only with a steady state creatinine    CBC and differential [[id number]]  (Abnormal) Collected:  06/05/20 2225    Lab Status:  Final result Specimen:  Blood from Hand, Left Updated:  06/05/20 2238     WBC 8 80 Thousand/uL      RBC 4 50 Million/uL      Hemoglobin 11 1 g/dL      Hematocrit 34 5 %      MCV 77 fL      MCH 24 8 pg      MCHC 32 3 g/dL      RDW 18 7 %      MPV 8 6 fL      Platelets 925 Thousands/uL      Neutrophils Relative 71 %      Lymphocytes Relative 19 %      Monocytes Relative 6 %      Eosinophils Relative 4 %      Basophils Relative 1 %      Neutrophils Absolute 6 20 Thousands/µL      Lymphocytes Absolute 1 70 Thousands/µL      Monocytes Absolute 0 50 Thousand/µL      Eosinophils Absolute 0 30 Thousand/µL      Basophils Absolute 0 10 Thousands/µL                  XR abdomen 1 view kub   Final Result by Rickey Mccray MD (06/10 1238)      Bilateral ureteral stents  Punctate renal calculi are not demonstrated radiographically  No radiopaque urinary tract calculi  Workstation performed: [id number]         XR retrograde pyelogram   Final Result by Rickey Mccray MD (06/10 )      Fluoroscopic guidance provided for bilateral retrograde pyelogram  Please see procedure report for further details  Workstation performed: [id number]         XR abdomen 1 view kub   Final Result by Rosalee Aguilar MD (06/06 1040)      Moderate amount of retained colonic stool in the right hemicolon, obscuring renal or ureteral calculi  See recent CT stone search  Workstation performed: [id number]         CT renal stone study abdomen pelvis wo contrast   ED Interpretation by Lrary Ruiz MD (06/06 0136)      1   7 mm proximal right ureteral calculus at the L3 level with associated moderate hydroureteronephrosis    In addition, there is downstream 2 mm mid right ureteral calculus  2   5 mm proximal left ureteral calculus causing mild hydroureter  No hydronephrosis  3   Punctate nonobstructing left renal calculi  The study was marked in [facility] for immediate notification  Workstation performed: [id number]         Final Result by Kaela Gonzáles MD (06/06 0058)      1   7 mm proximal right ureteral calculus at the L3 level with associated moderate hydroureteronephrosis  In addition, there is downstream 2 mm mid right ureteral calculus  2   5 mm proximal left ureteral calculus causing mild hydroureter  No hydronephrosis  3   Punctate nonobstructing left renal calculi  The study was marked in [facility] for immediate notification  Workstation performed: [id number]                    Procedures  Procedures         ED Course  ED Course as of Jun 12 1321   Sat Jun 06, 2020   0123 CT renal stone study abdomen pelvis wo contrast   0207 Patient pain is certainly reduced at this time  Discussed case with Dr Kate Henson in and he will come in and of morning evaluate the patient for possible bilateral renal stents  MDM  Number of Diagnoses or Management Options  Acute bilateral obstructive uropathy: established and worsening     Amount and/or Complexity of Data Reviewed  Clinical lab tests: reviewed  Tests in the medicine section of CPT®: reviewed    Risk of Complications, Morbidity, and/or Mortality  Presenting problems: high  Diagnostic procedures: high  Management options: moderate  General comments: Case was discussed with Dr Keo Devlin patient with bilateral kidney stones with possible obstruction on the to be admitted and probable stents placed in the morning      Patient Progress  Patient progress: stable        Disposition  Final diagnoses:   Acute bilateral obstructive uropathy     Time reflects when diagnosis was documented in both MDM as applicable and the Disposition within this note     Time User Action Codes Description Comment    6/6/2020  2:01 AM Judith Morel Add [N13 9] Acute bilateral obstructive uropathy       ED Disposition     ED Disposition Condition Date/Time Comment    Admit Stable Sat Jun 6, 2020  2:00 AM Case was discussed with PA and the patient's admission status was agreed to be to the service of Dr Griffith          Follow-up Information    None         Discharge Medication List as of 6/7/2020 12:20 PM      START taking these medications    Details   levofloxacin (LEVAQUIN) 500 mg tablet Take 1 tablet (500 mg total) by mouth every 24 hours for 3 days, Starting Sun 6/7/2020, Until Wed 6/10/2020, Normal      oxybutynin (DITROPAN XL) 15 MG 24 hr tablet Take 1 tablet (15 mg total) by mouth daily, Starting Mon 6/8/2020, Normal         CONTINUE these medications which have CHANGED    Details   acetaminophen (TYLENOL) 325 mg tablet Take 2 tablets (650 mg total) by mouth every 6 (six) hours as needed for mild pain, headaches or fever, Starting Sun 6/7/2020, No Print         CONTINUE these medications which have NOT CHANGED    Details   calcium citrate-vitamin D (CITRACAL+D) 315-200 MG-UNIT per tablet Take 1 tablet by mouth 2 (two) times a day, Historical Med      Multiple Vitamin (MULTIVITAMIN) tablet Take 1 tablet by mouth daily, Historical Med      NON FORMULARY Historical Med      omeprazole (PriLOSEC) 40 MG capsule TAKE ONE CAPSULE BY MOUTH TWICE DAILY, Normal      patient supplied medication Take 1 each by mouth daily bariatric vitamin, patient unsure of medication name, Historical Med         STOP taking these medications       oxyCODONE (ROXICODONE) 5 mg immediate release tablet Comments:   Reason for Stopping:         phenazopyridine (PYRIDIUM) 100 mg tablet Comments:   Reason for Stopping:         scopolamine (TRANSDERM-SCOP) 1 5 mg/3 days TD 72 hr patch Comments:   Reason for Stopping:             Outpatient Discharge Orders   Activity as tolerated     Call provider for:  persistent nausea or vomiting     Call provider for:  severe uncontrolled pain     Call provider for:  difficulty breathing, headache or visual disturbances     Call provider for:  persistent dizziness or light-headedness       PDMP Review       Value Time User    PDMP Reviewed  Yes 6/8/2020  3:37 PM Holly Meza MD          ED Provider  Electronically Signed by           Caleb Teixeira MD  06/12/20 [phone]

## 2020-06-06 NOTE — NURSING NOTE
Patient arrived to ICU 1 for PACU recovery s/p B/L  cystoscopy retrograde pyelogram and stent placement  Opens eyes to voice, follows commands  Denies any discomfort  NSR on monitor

## 2020-06-06 NOTE — NURSING NOTE
Pt received to med surg  Report received from [facility] ICU  Pt drowsy, awakens to voice  Denies pain  Vss  Personal needs and call bell within reach, will continue to monitor

## 2020-06-06 NOTE — OP NOTE
OPERATIVE REPORT  PATIENT NAME: Garrett Lassiter    :  1975  MRN: [id number]  Pt Location: [address] [place] [address] OR ROOM 01    SURGERY DATE: 2020    Surgeon(s) and Role:     * Erinn Mccracken MD - Primary    Preop Diagnosis:  Bilateral ureteral stones    Postop diagnosis:  Same    Procedure(s) (LRB):  Bilateral CYSTOSCOPY RETROGRADE PYELOGRAM WITH INSERTION STENT URETERAL (N/A)    Specimen(s):  ID Type Source Tests Collected by Time Destination   A : uac&S Urine Urine, Cystoscopic URINE CULTURE Erinn Mccracken MD 2020 [id number]    B : right kidney urine culture Urine Kidney, Right URINE CULTURE Erinn Mccracken MD 2020 7161    C : left kidney urine c&s Urine Kidney, Left URINE CULTURE Erinn Mccracken MD 2020 1002        Estimated Blood Loss:   Minimal    Drains:  Ureteral Drain/Stent Right ureter 6 Fr  (Active)   Number of days: 0       Ureteral Drain/Stent Left ureter 6 Fr  (Active)   Number of days: 0       Anesthesia Type:   General    Operative Indications: This is a 48-year-old woman who presented with right renal colic  CT scan showed bilateral 7 mm stones at the L3 vertebral levels as well as a 3 mm stone at the right L4 vertebral level  The stones were not visible on KUB x-ray  Options, procedures, benefits and risks were discussed and she agreed to the planned procedure  Operative Findings:  Bilateral filling defects at the L3 vertebral levels consistent with partially obstructing stones  There was mild-to-moderate dilation of the ureters and intrarenal collecting systems bilaterally above the stones  Complications:   None    Procedure and Technique:  The patient was properly identified in the operating room and after adequate general anesthesia was placed in the lithotomy position  The lower abdomen and genitalia were prepped and draped in usual fashion  Twenty-one Malawian cystoscope was inserted into the bladder urine was drained sent for urinalysis and culture    Cystoscopy was performed with 70 degree and 30 degree lenses  The bladder was smooth with no masses or calcifications  The ureteral orifices were normal bilaterally  A 5 Lebanese open-ended ureteral catheter was passed into the right ureteral orifice and contrast material was instilled under fluoroscopic guidance  The course and caliber of the ureter was normal with no dilation or filling defect until the level of the stone was reached at the L3 level  At this point the filling defect was seen and there was proximal dilation  A solo guidewire was passed through the catheter around the stone with some mild resistance and into the collecting system  The ureteral catheter followed into the collecting system  The wire was removed  A rapid drip of jorge urine was drained and sent for culture  The wire was replaced into the upper pole  The ureteral length was measured  A 6 Lebanese 26 cm Bard inlay optima stent was passed over the wire and the wire was removed leaving a good curl of stent within the renal pelvis and within the bladder  Attention was then placed to the left side  A 5 Lebanese open-ended catheter was passed into the left ureteral orifice and contrast material was instilled under fluoroscopic guidance  The course and caliber of the ureter was normal until the L3 level at which point there was a filling defect with proximal dilation of the ureter and collecting system  A solo guidewire was passed through the catheter and around the stone with mild resistance into the collecting system  The ureteral catheter followed into the collecting system and the wire was removed  A rapid drip of clear urine was drained and sent for culture  The wire was replaced into the upper pole  The ureteral length was measured and a 6 Lebanese 26 cm Bard inlay optima stent was passed over the wire  The wire was removed leaving a good curl of stent within the renal pelvis and within the bladder  The bladder was emptied and the cystoscope was removed    The patient tolerated the procedure well and left the operating room in good condition     I was present for the entire procedure    Patient Disposition:  PACU     SIGNATURE: Rodrigue García MD  DATE: June 6, 2020  TIME: 10:17 AM

## 2020-06-06 NOTE — PLAN OF CARE
Problem: Potential for Falls  Goal: Patient will remain free of falls  Description  INTERVENTIONS:  - Assess patient frequently for physical needs  -  Identify cognitive and physical deficits and behaviors that affect risk of falls    -  [place] fall precautions as indicated by assessment   - Educate patient/family on patient safety including physical limitations  - Instruct patient to call for assistance with activity based on assessment  - Modify environment to reduce risk of injury  - Consider OT/PT consult to assist with strengthening/mobility  Outcome: Progressing     Problem: PAIN - ADULT  Goal: Verbalizes/displays adequate comfort level or baseline comfort level  Description  Interventions:  - Encourage patient to monitor pain and request assistance  - Assess pain using appropriate pain scale  - Administer analgesics based on type and severity of pain and evaluate response  - Implement non-pharmacological measures as appropriate and evaluate response  - Consider cultural and social influences on pain and pain management  - Notify physician/advanced practitioner if interventions unsuccessful or patient reports new pain  Outcome: Progressing     Problem: INFECTION - ADULT  Goal: Absence or prevention of progression during hospitalization  Description  INTERVENTIONS:  - Assess and monitor for signs and symptoms of infection  - Monitor lab/diagnostic results  - Monitor all insertion sites, i e  indwelling lines, tubes, and drains  - Monitor endotracheal if appropriate and nasal secretions for changes in amount and color  - [place] appropriate cooling/warming therapies per order  - Administer medications as ordered  - Instruct and encourage patient and family to use good hand hygiene technique  - Identify and instruct in appropriate isolation precautions for identified infection/condition  Outcome: Progressing  Goal: Absence of fever/infection during neutropenic period  Description  INTERVENTIONS:  - Monitor WBC    Outcome: Progressing     Problem: SAFETY ADULT  Goal: Maintain or return to baseline ADL function  Description  INTERVENTIONS:  -  Assess patient's ability to carry out ADLs; assess patient's baseline for ADL function and identify physical deficits which impact ability to perform ADLs (bathing, care of mouth/teeth, toileting, grooming, dressing, etc )  - Assess/evaluate cause of self-care deficits   - Assess range of motion  - Assess patient's mobility; develop plan if impaired  - Assess patient's need for assistive devices and provide as appropriate  - Encourage maximum independence but intervene and supervise when necessary  - Involve family in performance of ADLs  - Assess for home care needs following discharge   - Consider OT consult to assist with ADL evaluation and planning for discharge  - Provide patient education as appropriate  Outcome: Progressing  Goal: Maintain or return mobility status to optimal level  Description  INTERVENTIONS:  - Assess patient's baseline mobility status (ambulation, transfers, stairs, etc )    - Identify cognitive and physical deficits and behaviors that affect mobility  - Identify mobility aids required to assist with transfers and/or ambulation (gait belt, sit-to-stand, lift, walker, cane, etc )  - [place] fall precautions as indicated by assessment  - Record patient progress and toleration of activity level on Mobility SBAR; progress patient to next Phase/Stage  - Instruct patient to call for assistance with activity based on assessment  - Consider rehabilitation consult to assist with strengthening/weightbearing, etc   Outcome: Progressing     Problem: DISCHARGE PLANNING  Goal: Discharge to home or other facility with appropriate resources  Description  INTERVENTIONS:  - Identify barriers to discharge w/patient and caregiver  - Arrange for needed discharge resources and transportation as appropriate  - Identify discharge learning needs (meds, wound care, etc )    - Refer to Case Management Department for coordinating discharge planning if the patient needs post-hospital services based on physician/advanced practitioner order or complex needs related to functional status, cognitive ability, or social support system   Outcome: Progressing     Problem: Knowledge Deficit  Goal: Patient/family/caregiver demonstrates understanding of disease process, treatment plan, medications, and discharge instructions  Description  Complete learning assessment and assess knowledge base    Interventions:  - Provide teaching at level of understanding  - Provide teaching via preferred learning methods  Outcome: Progressing     Problem: GASTROINTESTINAL - ADULT  Goal: Minimal or absence of nausea and/or vomiting  Description  INTERVENTIONS:  - Administer IV fluids if ordered to ensure adequate hydration  - Maintain NPO status until nausea and vomiting are resolved  - Nasogastric tube if ordered  - Administer ordered antiemetic medications as needed  - Provide nonpharmacologic comfort measures as appropriate  - Advance diet as tolerated, if ordered  - Consider nutrition services referral to assist patient with adequate nutrition and appropriate food choices  Outcome: Progressing  Goal: Maintains or returns to baseline bowel function  Description  INTERVENTIONS:  - Assess bowel function  - Encourage oral fluids to ensure adequate hydration  - Administer IV fluids if ordered to ensure adequate hydration  - Administer ordered medications as needed  - Encourage mobilization and activity  - Consider nutritional services referral to assist patient with adequate nutrition and appropriate food choices  Outcome: Progressing  Goal: Maintains adequate nutritional intake  Description  INTERVENTIONS:  - Monitor percentage of each meal consumed  - Identify factors contributing to decreased intake, treat as appropriate  - Assist with meals as needed  - Monitor I&O, weight, and lab values if indicated  - Obtain nutrition services referral as needed  Outcome: Progressing

## 2020-06-06 NOTE — H&P
H&P- Tabtiha Moran 1975, 38 y o  female MRN: [id number]    Unit/Bed#: -02 Encounter: [id number]    Primary Care Provider: Mark Grimm DO   Date and time admitted to hospital: 6/5/2020 10:19 PM        * Hydroureteronephrosis  Assessment & Plan  · Admit to Medicine  · Make NPO  · Give Zofran and pain control p r n  · Consult Urology in a m  · Obtain UA    Renal calculi  Assessment & Plan  As per above, additionally will strain all urine    Postgastrectomy malabsorption  Assessment & Plan  Continue pre-hospital Oscal +D and multivitamin    Gastroesophageal reflux disease  Assessment & Plan  Substitute Protonix 40 mg p o  Daily for pre-hospital Prilosec      VTE Prophylaxis: Patient is low risk, will ambulate  Code Status:  Level 1, no blood products  POLST: There is no POLST form on file for this patient (pre-hospital)  Discussion with family:  None present at bedside at time of exam    Anticipated Length of Stay:  Patient will be admitted on an Inpatient basis with an anticipated length of stay of  > 2 midnights  Justification for Hospital Stay:  Obstructing right renal calculi with hydroureteronephrosis requiring urological evaluation    Total Time for Visit, including Counseling / Coordination of Care: 1 hour  Greater than 50% of this total time spent on direct patient counseling and coordination of care  Chief Complaint:   Abdominal pain x1 day    History of Present Illness:    Tabitha Moran is a 38 y o  female who presents with abdominal pain x1 day  Patient presents to the ER for further evaluation treatment 1 day history of right lower quadrant abdominal pain accompanied with approximately 7 episodes of vomiting throughout the day  Patient states that she has been told that she had a renal calculi in the past but no intervention was required      Patient complains of 10/10 right lower quadrant abdominal pain with radiation into her right flank she describes the pain as 10/10 and stabbing in nature  Patient presented the ER requesting Dilaudid is she stated that Toradol was ineffective morphine causes stomach upset  Patient denies any fever chills, no urinary complaints to include hematuria, dysuria or increased urinary frequency  Review of Systems:  Review of Systems   Constitutional: Negative for chills and fever  Respiratory: Negative for cough and shortness of breath  Cardiovascular: Negative for chest pain and palpitations  Gastrointestinal: Positive for abdominal pain, nausea and vomiting  Genitourinary: Positive for flank pain  Negative for dysuria, frequency, hematuria and urgency  All other systems reviewed and are negative  Past Medical and Surgical History:   Past Medical History:   Diagnosis Date    Abdominal pain     Brain condition     Pseudotumor Cerebri     Chronic kidney disease     De Quervain's disease (tenosynovitis)     Depression 1/10/2019    Esophageal perforation     Gastric leak     GERD (gastroesophageal reflux disease)     Idiopathic intracranial hypertension     Kidney stone     Migraine     No blood products     per pt: personal and Orthodoxy beliefs   surgeon office aware 12/13/19    Obesity     Papilledema, both eyes     Postgastrectomy malabsorption     Presence of lumboperitoneal shunt     Resolved: Sep 20, 2017    Rotator cuff tendinitis     Resolved: Aug 23, 2017    Visual field defect        Past Surgical History:   Procedure Laterality Date    CSF SHUNT      LP shunt - 2015 -  shunt - 2017    ESOPHAGOGASTRODUODENOSCOPY N/A 2/23/2018    Procedure: ESOPHAGOGASTRODUODENOSCOPY (EGD) WITH ESOPHAGEAL STENT PLACEMENT;  Surgeon: Ese Elise MD;  Location:  MAIN OR;  Service: Thoracic    ESOPHAGOGASTRODUODENOSCOPY N/A 2/23/2018    Procedure: ESOPHAGOGASTRODUODENOSCOPY (EGD) WITH REMOVAL ESOPHAGEAL STENT  AND REPLACEMENT WITH 23mm X155mm 1111 [address] [place] [address],[address];  Surgeon: Ese Elise MD;  Location:  MAIN OR;  Service: Thoracic    ESOPHAGOGASTRODUODENOSCOPY N/A 3/28/2018    Procedure: ESOPHAGOGASTRODUODENOSCOPY (EGD) with PEJ placement ;  Surgeon: Roberto Lu MD;  [facility]; Service: Gastroenterology    ESOPHAGOGASTRODUODENOSCOPY N/A 4/5/2018    Procedure: ESOPHAGOGASTRODUODENOSCOPY (EGD) with botox injection and kaofed placement;  Surgeon: Larry Salas DO;  [facility]; Service: Gastroenterology    ESOPHAGOGASTRODUODENOSCOPY N/A 4/10/2018    Procedure: ESOPHAGOGASTRODUODENOSCOPY (EGD) with Kaofed placement;  Surgeon: Clifton Hernandez MD;  [facility]; Service: Gastroenterology    ESOPHAGOSCOPY WITH STENT INSERTION N/A 1/24/2018    Procedure: INSERTION STENT ESOPHAGEAL;  Surgeon: Ron Lopez MD;  [facility]; Service: Gastroenterology    EYE SURGERY Right 1980    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)     GASTRIC BYPASS  12/19/2016    Lap sleeve gastrectomy w/shunt length shortening procedure    HERNIA REPAIR      HYSTERECTOMY  03/14/2013    IR LUMBAR PUNCTURE  8/29/2018    LAPAROTOMY N/A 1/25/2018    Procedure: Exploratory Laparotomy, wash out,placement of drains, placement of NG feeding tube ;   Surgeon: Juanita Herbert DO;  [facility];  Service: General    LUMBAR PERITONEAL SHUNT  11/19/2015    Laparoscopic assisted    CT CREATE SHUNT:VENTRIC-ATRIAL Right 12/18/2019    Procedure: IMAGE GUIDED INSERTION OF RIGHT CORONAL VENTRICULAR-ATRIAL SHUNT;  Surgeon: Lizbeth Cooley MD;  [facility];  Service: Neurosurgery    CT CREATE SHUNT:VENTRIC-PERITONEAL Right 5/31/2017    Procedure: IMAGE GUIDED CORONAL PLACEMENT OF PROGRAMABLE VENTRICULAR-PERITONEAL SHUNT, REMOVAL OF LP SHUNT ;  Surgeon: Lizbeth Cooley MD;  [facility];  Service: Neurosurgery    CT EGD TRANSORAL BIOPSY SINGLE/MULTIPLE N/A 6/27/2018    Procedure: ESOPHAGOGASTRODUODENOSCOPY (EGD) with padlock clip placement;  Surgeon: Barrie Juarez MD;  [facility];  Service: Theone Staple CSF SHUNT,W/O REPLACE Right 2/5/2018    Procedure: Removal of  shunt;  Surgeon: Jade Long MD;  [facility];  Service: Neurosurgery    RI REPLACEMENT/REVISION,CSF SHUNT Right 1/25/2018    Procedure: Externalization of right-sided SHUNT VENTRICULAR-PERITONEAL in anterior chest wall ribs two and three level  ;  Surgeon: Aracelis Redding MD;  [facility];  Service: Neurosurgery    WRIST SURGERY Right     x3 2006, 2008       Meds/Allergies:  Prior to Admission medications    Medication Sig Start Date End Date Taking?  Authorizing Provider   calcium citrate-vitamin D (CITRACAL+D) 315-200 MG-UNIT per tablet Take 1 tablet by mouth 2 (two) times a day   Yes Historical Provider, MD   Multiple Vitamin (MULTIVITAMIN) tablet Take 1 tablet by mouth daily   Yes Historical Provider, MD   NON FORMULARY    Yes Historical Provider, MD   omeprazole (PriLOSEC) 40 MG capsule TAKE ONE CAPSULE BY MOUTH TWICE DAILY 5/11/20  Yes Billy Saavedra PA-C   patient supplied medication Take 1 each by mouth daily bariatric vitamin, patient unsure of medication name   Yes Historical Provider, MD   acetaminophen (TYLENOL) 325 mg tablet Take 2 tablets (650 mg total) by mouth every 4 (four) hours as needed for mild pain  Patient not taking: Reported on 1/28/2020 12/22/19   Ludwin Asif PA-C   oxyCODONE (ROXICODONE) 5 mg immediate release tablet Take 1 tablet (5 mg total) by mouth every 4 (four) hours as needed for moderate pain For continuing therapyMax Daily Amount: 30 mg  Patient not taking: Reported on 6/5/2020 1/17/20   Billy Saavedra PA-C   phenazopyridine (PYRIDIUM) 100 mg tablet Take 1 tablet (100 mg total) by mouth 3 (three) times a day as needed for bladder spasms  Patient not taking: Reported on 1/28/2020 1/3/20   Hunter Fofana DO   scopolamine (TRANSDERM-SCOP) 1 5 mg/3 days TD 72 hr patch Place 1 patch on the skin every third day  Patient not taking: Reported on 19   Margarita Whitley PA-C     I have reviewed home medications with patient personally  Allergies: Allergies   Allergen Reactions    Benadryl [Diphenhydramine] Anaphylaxis     Throat closing    Phenergan [Promethazine] Anaphylaxis       Social History:  Marital Status: Single   Occupation:    Patient Pre-hospital Living Situation:  Resides at home with boyfriend and son  Patient Pre-hospital Level of Mobility:  Full without assist  Patient Pre-hospital Diet Restrictions:  Bariatric diet  Substance Use History:     Social History     Substance and Sexual Activity   Alcohol Use Never    Frequency: Never    Binge frequency: Never     Social History     Tobacco Use   Smoking Status Former Smoker    Packs/day: 0 50    Years: 20 00    Pack years: 10 00    Last attempt to quit: 2012    Years since quittin 7   Smokeless Tobacco Never Used     Social History     Substance and Sexual Activity   Drug Use Yes    Frequency: 7 0 times per week    Types: Marijuana    Comment: medical marijuana, vaping & topical        Family History:  I have reviewed the patients family history    Physical Exam:   Vitals:   Blood Pressure: 120/61 (20 0500)  Pulse: 61 (20 0500)  Temperature: 98 8 °F (37 1 °C) (20)  Temp Source: Temporal (20)  Respirations: 16 (20 0500)  Height: 5' 4" (162 6 cm) (20)  Weight - Scale: 61 2 kg (135 lb) (20)  SpO2: 100 % (20)    Physical Exam   Constitutional: She is oriented to person, place, and time  She appears well-developed and well-nourished  She appears distressed  HENT:   Head: Normocephalic and atraumatic  Mouth/Throat: No oropharyngeal exudate  Eyes: Pupils are equal, round, and reactive to light  EOM are normal  No scleral icterus  Neck: Normal range of motion  Neck supple  No JVD present  Cardiovascular: Normal rate, regular rhythm and normal heart sounds  No murmur heard  Pulmonary/Chest: Effort normal and breath sounds normal  No respiratory distress  She has no wheezes  She has no rales  Abdominal: Soft  Bowel sounds are normal  There is tenderness in the right lower quadrant  There is CVA tenderness  There is no rebound and no guarding  Musculoskeletal: Normal range of motion  She exhibits no edema  Lymphadenopathy:     She has no cervical adenopathy  Neurological: She is alert and oriented to person, place, and time  Skin: Skin is warm and dry  No rash noted  No erythema  Psychiatric: She has a normal mood and affect  Her behavior is normal    Nursing note and vitals reviewed  Additional Data:   Lab Results: I have personally reviewed pertinent reports  Results from last 7 days   Lab Units 06/05/20  2225   WBC Thousand/uL 8 80   HEMOGLOBIN g/dL 11 1*   HEMATOCRIT % 34 5*   PLATELETS Thousands/uL 228   NEUTROS PCT % 71   LYMPHS PCT % 19*   MONOS PCT % 6   EOS PCT % 4     Results from last 7 days   Lab Units 06/05/20  2225   POTASSIUM mmol/L 3 8   CHLORIDE mmol/L 104   CO2 mmol/L 24   BUN mg/dL 18   CREATININE mg/dL 0 80   CALCIUM mg/dL 9 6   ALK PHOS U/L 54   ALT U/L 6*   AST U/L 17                   Imaging: I have personally reviewed pertinent reports  CT renal stone study abdomen pelvis wo contrast   ED Interpretation by John Teran MD (06/06 0136)      1   7 mm proximal right ureteral calculus at the L3 level with associated moderate hydroureteronephrosis  In addition, there is downstream 2 mm mid right ureteral calculus  2   5 mm proximal left ureteral calculus causing mild hydroureter  No hydronephrosis  3   Punctate nonobstructing left renal calculi  The study was marked in [facility] for immediate notification              Workstation performed: [id number]         Final Result by Miya Valles MD (06/06 0058)      1   7 mm proximal right ureteral calculus at the L3 level with associated moderate hydroureteronephrosis  In addition, there is downstream 2 mm mid right ureteral calculus  2   5 mm proximal left ureteral calculus causing mild hydroureter  No hydronephrosis  3   Punctate nonobstructing left renal calculi  The study was marked in [facility] for immediate notification  Workstation performed: [id number]             EKG, Pathology, and Other Studies Reviewed on Admission:   · EKG: N/A    Epic Records Reviewed: Yes     ** Please Note: This note has been constructed using a voice recognition system   **

## 2020-06-06 NOTE — SOCIAL WORK
Chart reviewed by cm, I attempted to assess at 14:05 & 15:20 pm and the pt was not in the room, I was able to assess the pt at 16:00, pt is independent and drives  Pt lives wit her boyfriend , pt has rx plan at [facility]  In [facility], pt has slvna in the past, pt has declined the need for hhc, pt denies smoking and alcohol, pt states she does use medical marijuana, pt [place] 1 step outside, 12 basement steps and she does not need to use, pt's family will transport the pt home, pt was not stable for d /c today, plan is for the pt to be d/c tomorrow and her family will transport the pt hoem, pt decliend any d/c needs  Patient/caregiver received discharge checklist   Content reviewed  Patient/caregiver encouraged to participate in discharge plan of care prior to discharge home  CM reviewed d/c planning process including the following: identifying help at home, patient preference for d/c planning needs, availability of treatment team to discuss questions or concerns patient and/or family may have regarding understanding medications and recognizing signs and symptoms once discharged  CM also encouraged patient to follow up with all recommended appointments after discharge  Patient advised of importance for patient and family to participate in managing patients medical well being

## 2020-06-07 ENCOUNTER — APPOINTMENT (INPATIENT)
Dept: RADIOLOGY | Facility: HOSPITAL | Age: 45
DRG: 465 | End: 2020-06-07
Payer: COMMERCIAL

## 2020-06-07 VITALS
BODY MASS INDEX: 23.05 KG/M2 | RESPIRATION RATE: 18 BRPM | SYSTOLIC BLOOD PRESSURE: 131 MMHG | HEIGHT: 64 IN | HEART RATE: 63 BPM | OXYGEN SATURATION: 98 % | WEIGHT: 135 LBS | TEMPERATURE: 98.9 F | DIASTOLIC BLOOD PRESSURE: 60 MMHG

## 2020-06-07 LAB
ANION GAP SERPL CALCULATED.3IONS-SCNC: 8 MMOL/L (ref 4–13)
BACTERIA UR CULT: ABNORMAL
BACTERIA UR CULT: NORMAL
BACTERIA UR CULT: NORMAL
BASOPHILS # BLD AUTO: 0 THOUSANDS/ΜL (ref 0–0.1)
BASOPHILS NFR BLD AUTO: 0 % (ref 0–2)
BUN SERPL-MCNC: 13 MG/DL (ref 7–25)
CALCIUM SERPL-MCNC: 8.7 MG/DL (ref 8.6–10.5)
CHLORIDE SERPL-SCNC: 106 MMOL/L (ref 98–107)
CO2 SERPL-SCNC: 23 MMOL/L (ref 21–31)
CREAT SERPL-MCNC: 0.69 MG/DL (ref 0.6–1.2)
EOSINOPHIL # BLD AUTO: 0 THOUSAND/ΜL (ref 0–0.61)
EOSINOPHIL NFR BLD AUTO: 0 % (ref 0–5)
ERYTHROCYTE [DISTWIDTH] IN BLOOD BY AUTOMATED COUNT: 18.6 % (ref 11.5–14.5)
GFR SERPL CREATININE-BSD FRML MDRD: 105 ML/MIN/1.73SQ M
GLUCOSE SERPL-MCNC: 99 MG/DL (ref 65–99)
HCT VFR BLD AUTO: 31.8 % (ref 42–47)
HGB BLD-MCNC: 10 G/DL (ref 12–16)
LYMPHOCYTES # BLD AUTO: 0.7 THOUSANDS/ΜL (ref 0.6–4.47)
LYMPHOCYTES NFR BLD AUTO: 8 % (ref 21–51)
MCH RBC QN AUTO: 24.7 PG (ref 26–34)
MCHC RBC AUTO-ENTMCNC: 31.4 G/DL (ref 31–37)
MCV RBC AUTO: 79 FL (ref 81–99)
MONOCYTES # BLD AUTO: 0.6 THOUSAND/ΜL (ref 0.17–1.22)
MONOCYTES NFR BLD AUTO: 7 % (ref 2–12)
NEUTROPHILS # BLD AUTO: 7.1 THOUSANDS/ΜL (ref 1.4–6.5)
NEUTS SEG NFR BLD AUTO: 85 % (ref 42–75)
PLATELET # BLD AUTO: 207 THOUSANDS/UL (ref 149–390)
PMV BLD AUTO: 8.6 FL (ref 8.6–11.7)
POTASSIUM SERPL-SCNC: 3.9 MMOL/L (ref 3.5–5.5)
RBC # BLD AUTO: 4.03 MILLION/UL (ref 3.9–5.2)
SODIUM SERPL-SCNC: 137 MMOL/L (ref 134–143)
WBC # BLD AUTO: 8.3 THOUSAND/UL (ref 4.8–10.8)

## 2020-06-07 PROCEDURE — 85025 COMPLETE CBC W/AUTO DIFF WBC: CPT | Performed by: INTERNAL MEDICINE

## 2020-06-07 PROCEDURE — 99239 HOSP IP/OBS DSCHRG MGMT >30: CPT | Performed by: INTERNAL MEDICINE

## 2020-06-07 PROCEDURE — 74018 RADEX ABDOMEN 1 VIEW: CPT

## 2020-06-07 PROCEDURE — 80048 BASIC METABOLIC PNL TOTAL CA: CPT | Performed by: INTERNAL MEDICINE

## 2020-06-07 RX ORDER — LEVOFLOXACIN 500 MG/1
500 TABLET, FILM COATED ORAL EVERY 24 HOURS
Qty: 3 TABLET | Refills: 0 | Status: SHIPPED | OUTPATIENT
Start: 2020-06-07 | End: 2020-06-10

## 2020-06-07 RX ORDER — ACETAMINOPHEN 325 MG/1
650 TABLET ORAL EVERY 6 HOURS PRN
Qty: 30 TABLET | Refills: 0
Start: 2020-06-07 | End: 2020-07-30

## 2020-06-07 RX ORDER — OXYBUTYNIN CHLORIDE 15 MG/1
15 TABLET, EXTENDED RELEASE ORAL DAILY
Qty: 15 TABLET | Refills: 0 | Status: SHIPPED | OUTPATIENT
Start: 2020-06-08 | End: 2020-06-17

## 2020-06-07 RX ORDER — METOCLOPRAMIDE HYDROCHLORIDE 5 MG/ML
10 INJECTION INTRAMUSCULAR; INTRAVENOUS EVERY 6 HOURS PRN
Status: DISCONTINUED | OUTPATIENT
Start: 2020-06-07 | End: 2020-06-07 | Stop reason: HOSPADM

## 2020-06-07 RX ADMIN — SODIUM CHLORIDE 100 ML/HR: 0.9 INJECTION, SOLUTION INTRAVENOUS at 03:33

## 2020-06-07 RX ADMIN — ONDANSETRON 4 MG: 2 INJECTION INTRAMUSCULAR; INTRAVENOUS at 06:12

## 2020-06-07 RX ADMIN — HYDROCODONE BITARTRATE AND ACETAMINOPHEN 1 TABLET: 5; 325 TABLET ORAL at 09:42

## 2020-06-07 RX ADMIN — MORPHINE SULFATE 2 MG: 2 INJECTION, SOLUTION INTRAMUSCULAR; INTRAVENOUS at 06:12

## 2020-06-07 RX ADMIN — OXYBUTYNIN CHLORIDE 15 MG: 5 TABLET, EXTENDED RELEASE ORAL at 09:43

## 2020-06-07 RX ADMIN — PANTOPRAZOLE SODIUM 40 MG: 40 TABLET, DELAYED RELEASE ORAL at 06:08

## 2020-06-07 RX ADMIN — METOCLOPRAMIDE 10 MG: 5 INJECTION, SOLUTION INTRAMUSCULAR; INTRAVENOUS at 12:12

## 2020-06-07 RX ADMIN — DOCUSATE SODIUM 100 MG: 100 CAPSULE, LIQUID FILLED ORAL at 09:41

## 2020-06-07 NOTE — QUICK NOTE
Was informed by nurse that patient has been vomiting and unable hold anything down  I want to discuss this with the patient who stated that she during her ginger ale quickly this morning and that is what led to her vomiting  Patient states that she will do better at home and is adamant about being discharged home  She states that she does not want to stay because being in the hospital makes things worse for her because she had a long hospital course recently which required her to stay in the hospital for approximately 4 months  Patient states that she has tea and vitamin water at home which she prefers  Also states that she would be more comfortable at home  I did inform her that we are sending her home with an antibiotic that she needs to keep down and she understands  She states that if she has any worsening of her symptoms she will return to the hospital   Nurse was with this of the conversation  Advised patient multiple times that she should stay however patient continued to states she would like to go home and thinks she will do better at home  Discussed with Dr Cindy Fuentes who stated that if patient is adamant about being discharged home then she may go and he will follow-up with her as an outpatient

## 2020-06-07 NOTE — DISCHARGE SUMMARY
Discharge- Harvey Rubinstein 1975, 38 y o  female MRN: [id number]    Unit/Bed#: -02 Encounter: [id number]    Primary Care Provider: Mariel Fatima DO   Date and time admitted to hospital: 6/5/2020 10:19 PM        * Hydroureteronephrosis  Assessment & Plan  · Patient with bilateral kidney stones status post bilateral stents on 06/06/2020 by Urology  · Patient will be discharged on 3 days of Levaquin and Ditropan per urology  · Follow-up with urology as outpatient    Postgastrectomy malabsorption  Assessment & Plan  Continue home meds    Renal calculi  Assessment & Plan  Please see above    Gastroesophageal reflux disease  Assessment & Plan  Continue PPI      Discharging Physician / Practitioner: Leif Pruett MD  PCP: Mariel Fatima DO  Admission Date:   Admission Orders (From admission, onward)     Ordered        06/06/20 0148  Inpatient Admission (expected length of stay for this patient Order details is greater than two midnights)  Once                   Discharge Date: 06/07/20    Resolved Problems  Date Reviewed: 1/28/2020    None          Consultations During Hospital Stay:  · Urology    Procedures Performed:   · Bilateral ureteral stents    Significant Findings / Test Results:   · Kidney stones    Incidental Findings:   · None     Test Results Pending at Discharge (will require follow up): · None     Outpatient Tests Requested:  · Routine labs with PCP    Complications:     None    Reason for Admission:  Kidney stones    Hospital Course:     Harvey Rubinstein is a 38 y o  female patient who originally presented to the hospital on 6/5/2020 due to flank pain  Patient was found have bilateral kidney stones and urology was consulted  Patient was taken to the OR on 06/06/2020 and had bilateral ureteral stents placed  Discussed case with urology today who recommended discharging patient on 3 day course of Levaquin as well as prescription for Ditropan    Patient will follow-up with urology as outpatient    Please see above list of diagnoses and related plan for additional information  Condition at Discharge: stable     Discharge Day Visit / Exam:     Subjective:  Patient states that she does have urgency however would like to go home  No fever or chills  Vitals: Blood Pressure: 131/60 (06/07/20 0817)  Pulse: 63 (06/07/20 0817)  Temperature: 98 9 °F (37 2 °C) (06/07/20 0817)  Temp Source: Tympanic (06/07/20 0817)  Respirations: 18 (06/07/20 0817)  Height: 5' 4" (162 6 cm) (06/05/20 2208)  Weight - Scale: 61 2 kg (135 lb) (06/05/20 2208)  SpO2: 98 % (06/07/20 0817)     Exam:   Physical Exam   Constitutional: She appears well-developed  No distress  HENT:   Head: Normocephalic and atraumatic  Eyes: Conjunctivae and EOM are normal    Neck: Normal range of motion  Neck supple  Cardiovascular: Normal rate and regular rhythm  Pulmonary/Chest: Effort normal  No respiratory distress  Abdominal: Soft  She exhibits no distension  There is no tenderness  Musculoskeletal: Normal range of motion  She exhibits no edema  Neurological: She is alert  No cranial nerve deficit  Skin: Skin is warm and dry  Psychiatric: She has a normal mood and affect  Her behavior is normal          Discharge instructions/Information to patient and family:   See after visit summary for information provided to patient and family  Provisions for Follow-Up Care:  See after visit summary for information related to follow-up care and any pertinent home health orders  Disposition:     Home    For Discharges to [facility]:   · Not Applicable to this Patient - Not Applicable to this Patient    Planned Readmission:    no     Discharge Statement:  I spent 35 minutes discharging the patient  This time was spent on the day of discharge  I had direct contact with the patient on the day of discharge   Greater than 50% of the total time was spent examining patient, answering all patient questions, arranging and discussing plan of care with patient as well as directly providing post-discharge instructions  Additional time then spent on discharge activities  Discharge Medications:  See after visit summary for reconciled discharge medications provided to patient and family        ** Please Note: This note has been constructed using a voice recognition system **

## 2020-06-07 NOTE — PLAN OF CARE
Problem: Potential for Falls  Goal: Patient will remain free of falls  Description  INTERVENTIONS:  - Assess patient frequently for physical needs  -  Identify cognitive and physical deficits and behaviors that affect risk of falls    -  [place] fall precautions as indicated by assessment   - Educate patient/family on patient safety including physical limitations  - Instruct patient to call for assistance with activity based on assessment  - Modify environment to reduce risk of injury  - Consider OT/PT consult to assist with strengthening/mobility  Outcome: Progressing     Problem: PAIN - ADULT  Goal: Verbalizes/displays adequate comfort level or baseline comfort level  Description  Interventions:  - Encourage patient to monitor pain and request assistance  - Assess pain using appropriate pain scale  - Administer analgesics based on type and severity of pain and evaluate response  - Implement non-pharmacological measures as appropriate and evaluate response  - Consider cultural and social influences on pain and pain management  - Notify physician/advanced practitioner if interventions unsuccessful or patient reports new pain  Outcome: Progressing     Problem: INFECTION - ADULT  Goal: Absence or prevention of progression during hospitalization  Description  INTERVENTIONS:  - Assess and monitor for signs and symptoms of infection  - Monitor lab/diagnostic results  - Monitor all insertion sites, i e  indwelling lines, tubes, and drains  - Monitor endotracheal if appropriate and nasal secretions for changes in amount and color  - [place] appropriate cooling/warming therapies per order  - Administer medications as ordered  - Instruct and encourage patient and family to use good hand hygiene technique  - Identify and instruct in appropriate isolation precautions for identified infection/condition  Outcome: Progressing  Goal: Absence of fever/infection during neutropenic period  Description  INTERVENTIONS:  - Monitor WBC    Outcome: Progressing     Problem: SAFETY ADULT  Goal: Maintain or return to baseline ADL function  Description  INTERVENTIONS:  -  Assess patient's ability to carry out ADLs; assess patient's baseline for ADL function and identify physical deficits which impact ability to perform ADLs (bathing, care of mouth/teeth, toileting, grooming, dressing, etc )  - Assess/evaluate cause of self-care deficits   - Assess range of motion  - Assess patient's mobility; develop plan if impaired  - Assess patient's need for assistive devices and provide as appropriate  - Encourage maximum independence but intervene and supervise when necessary  - Involve family in performance of ADLs  - Assess for home care needs following discharge   - Consider OT consult to assist with ADL evaluation and planning for discharge  - Provide patient education as appropriate  Outcome: Progressing  Goal: Maintain or return mobility status to optimal level  Description  INTERVENTIONS:  - Assess patient's baseline mobility status (ambulation, transfers, stairs, etc )    - Identify cognitive and physical deficits and behaviors that affect mobility  - Identify mobility aids required to assist with transfers and/or ambulation (gait belt, sit-to-stand, lift, walker, cane, etc )  - [place] fall precautions as indicated by assessment  - Record patient progress and toleration of activity level on Mobility SBAR; progress patient to next Phase/Stage  - Instruct patient to call for assistance with activity based on assessment  - Consider rehabilitation consult to assist with strengthening/weightbearing, etc   Outcome: Progressing     Problem: DISCHARGE PLANNING  Goal: Discharge to home or other facility with appropriate resources  Description  INTERVENTIONS:  - Identify barriers to discharge w/patient and caregiver  - Arrange for needed discharge resources and transportation as appropriate  - Identify discharge learning needs (meds, wound care, etc )    - Refer to Case Management Department for coordinating discharge planning if the patient needs post-hospital services based on physician/advanced practitioner order or complex needs related to functional status, cognitive ability, or social support system   Outcome: Progressing     Problem: Knowledge Deficit  Goal: Patient/family/caregiver demonstrates understanding of disease process, treatment plan, medications, and discharge instructions  Description  Complete learning assessment and assess knowledge base    Interventions:  - Provide teaching at level of understanding  - Provide teaching via preferred learning methods  Outcome: Progressing     Problem: GASTROINTESTINAL - ADULT  Goal: Minimal or absence of nausea and/or vomiting  Description  INTERVENTIONS:  - Administer IV fluids if ordered to ensure adequate hydration  - Maintain NPO status until nausea and vomiting are resolved  - Nasogastric tube if ordered  - Administer ordered antiemetic medications as needed  - Provide nonpharmacologic comfort measures as appropriate  - Advance diet as tolerated, if ordered  - Consider nutrition services referral to assist patient with adequate nutrition and appropriate food choices  Outcome: Progressing  Goal: Maintains or returns to baseline bowel function  Description  INTERVENTIONS:  - Assess bowel function  - Encourage oral fluids to ensure adequate hydration  - Administer IV fluids if ordered to ensure adequate hydration  - Administer ordered medications as needed  - Encourage mobilization and activity  - Consider nutritional services referral to assist patient with adequate nutrition and appropriate food choices  Outcome: Progressing  Goal: Maintains adequate nutritional intake  Description  INTERVENTIONS:  - Monitor percentage of each meal consumed  - Identify factors contributing to decreased intake, treat as appropriate  - Assist with meals as needed  - Monitor I&O, weight, and lab values if indicated  - Obtain nutrition services referral as needed  Outcome: Progressing     Problem: DISCHARGE PLANNING - CARE MANAGEMENT  Goal: Discharge to post-acute care or home with appropriate resources  Description  INTERVENTIONS:  - Conduct assessment to determine patient/family and health care team treatment goals, and need for post-acute services based on payer coverage, community resources, and patient preferences, and barriers to discharge  - Address psychosocial, clinical, and financial barriers to discharge as identified in assessment in conjunction with the patient/family and health care team  - Arrange appropriate level of post-acute services according to patient's   needs and preference and payer coverage in collaboration with the physician and health care team  - Communicate with and update the patient/family, physician, and health care team regarding progress on the discharge plan  - Arrange appropriate transportation to post-acute venues    Pt's goal is to return home   Outcome: Progressing

## 2020-06-07 NOTE — ASSESSMENT & PLAN NOTE
· Patient with bilateral kidney stones status post bilateral stents on 06/06/2020 by Urology  · Patient will be discharged on 3 days of Levaquin and Ditropan per urology  · Follow-up with urology as outpatient

## 2020-06-07 NOTE — NURSING NOTE
Pt discharged home  Vss  Discharge instructions read and explained to pt  All questions answered  IV removed without complications and tip intact  All belongings with pt  Pt escorted to front entrance and assisted into car of friend

## 2020-06-07 NOTE — UTILIZATION REVIEW
Initial Clinical Review    Admission: Date/Time/Statement: Admission Orders (From admission, onward)     Ordered        06/06/20 0148  Inpatient Admission (expected length of stay for this patient Order details is greater than two midnights)  Once                   Orders Placed This Encounter   Procedures    Inpatient Admission (expected length of stay for this patient Order details is greater than two midnights)     Standing Status:   Standing     Number of Occurrences:   1     Order Specific Question:   Admitting Physician     Answer:   Azeem Rey     Order Specific Question:   Level of Care     Answer:   Med Surg [16]     Order Specific Question:   Estimated length of stay     Answer:   More than 2 Midnights     Order Specific Question:   Certification     Answer:   I certify that inpatient services are medically necessary for this patient for a duration of greater than two midnights  See H&P and MD Progress Notes for additional information about the patient's course of treatment  ED Arrival Information     Expected Arrival Acuity Means of Arrival Escorted By Service Admission Type    - 6/5/2020 22:07 Urgent Ambulance [address] Urgent    Arrival Complaint    abd C  [address]        Chief Complaint   Patient presents with    Abdominal Pain     RLQ pain started 2 hours PTA  pt  has had 6 episodes of emesis since then  Pt  arrives via EMS dry heaving c/o sharp stabbing abdominal pain       Assessment/Plan: 38year old female to the ED from home via EMS with complaints of intractable nausea, vomiting, right lower quadrant abdomina and flank pain for 2 hours prior to her arrival   Admitted to inpatient for hydroureteronephrosis  She arrives in distress, with right lower quadrant tenderness and CVA tenderness  In ED, she was given IV zofran, dilaudid, toradol  CT of A/P shows bilateral ureteral calculi with right sided hydroureteronephrosis   Urology consult    OPERATIVE REPORT:  Cystoscopy bilateral retrograde pyelogram with insertion renal stent  SURGERY DATE: 6/6/2020  Bilateral CYSTOSCOPY RETROGRADE PYELOGRAM WITH INSERTION STENT URETERAL (N/A)   Operative Findings:  Bilateral filling defects at the L3 vertebral levels consistent with partially obstructing stones  There was mild-to-moderate dilation of the ureters and intrarenal collecting systems bilaterally above the stones  UPdate 6/7:  Sitting on toilet a lot for her comfort  Complains of burinng to urethra  Advance diet    Continues to require IV Morphine for pain       ED Triage Vitals   Temperature Pulse Respirations Blood Pressure SpO2   06/06/20 0004 06/05/20 2208 06/05/20 2208 06/05/20 2208 06/05/20 2208   98 8 °F (37 1 °C) 60 18 126/69 100 %      Temp Source Heart Rate Source Patient Position - Orthostatic VS BP Location FiO2 (%)   06/06/20 0004 06/06/20 0758 06/06/20 0758 06/06/20 0758 --   Temporal Monitor Lying Right arm       Pain Score       06/05/20 2208       Worst Possible Pain        Wt Readings from Last 1 Encounters:   06/05/20 61 2 kg (135 lb)     Additional Vital Signs:   Date/Time Temp Pulse  Resp  BP SpO2  O2 Flow Rate (L/min) O2 Device Patient Position - Orthostatic VS   06/07/20 0817 98 9 °F (37 2 °C) 63  18  131/60 98 %  -- None (Room air) Lying   06/07/20 0036 99 6 °F (37 6 °C) 68  18  123/58 98 %  -- None (Room air) Lying   06/06/20 2000 -- --  --  -- --  -- None (Room air) --   06/06/20 1921 100 °F (37 8 °C) 75  20  120/61 100 %  -- None (Room air) Lying   06/06/20 1503 98 3 °F (36 8 °C) 60  19  126/59 99 %  -- -- Lying   06/06/20 1403 97 6 °F (36 4 °C) 73  18  141/71 98 %  -- None (Room air) Sitting   06/06/20 1303 98 3 °F (36 8 °C) 95  18  143/81 99 %  -- None (Room air) Lying   06/06/20 1159 98 5 °F (36 9 °C) 66  18  122/60 97 %  -- None (Room air) Lying   06/06/20 1121 97 6 °F (36 4 °C) 67  19  122/62 99 %  -- None (Room air) --   06/06/20 1110 -- 70  20  124/62 100 %  -- None (Room air) --   06/06/20 1100 -- 80  20  136/67 99 %  -- None (Room air) --   06/06/20 1050 97 6 °F (36 4 °C) 65  16  125/64 100 %  -- Nasal cannula --   06/06/20 1041 -- 66  --  119/61 100 %  2 L/min Nasal cannula --   06/06/20 1030 97 3 °F (36 3 °C)Abnormal  88  --  119/60 100 %  5 L/min Simple mask --   06/06/20 0758 98 6 °F (37 °C) 56  16  138/67 100 %  -- None (Room air) Lying   06/06/20 0500 -- 61  16  120/61 --  -- -- --   06/06/20 0438 -- 64  16  127/59 --  -- -- --   06/06/20 0240 -- --  --  141/75 --  -- -- --   06/06/20 0004 98 8 °F (37 1 °C) --  --  -- --  -- -- --   06/05/20 2305 -- --  --  -- --  -- None (Room air) --   06/05/20 2208 -- 60  18  126/69 100 %  -- None       Pertinent Labs/Diagnostic Test Results:   6/6 XRay abdomen: Moderate amount of retained colonic stool in the right hemicolon, obscuring renal or ureteral calculi   See recent CT stone search  6/6 CT A/P:  7 mm proximal right ureteral calculus at the L3 level with associated moderate hydroureteronephrosis   In addition, there is downstream 2 mm mid right ureteral calculus    5 mm proximal left ureteral calculus causing mild hydroureter   No hydronephrosis  Punctate nonobstructing left renal calculi      Results from last 7 days   Lab Units 06/06/20  0240   SARS-COV-2  Negative     Results from last 7 days   Lab Units 06/07/20  0539 06/06/20  0855 06/05/20  2225   WBC Thousand/uL 8 30 10 00 8 80   HEMOGLOBIN g/dL 10 0* 10 7* 11 1*   HEMATOCRIT % 31 8* 33 5* 34 5*   PLATELETS Thousands/uL 207 210 228   NEUTROS ABS Thousands/µL 7 10*  --  6 20         Results from last 7 days   Lab Units 06/07/20  0539 06/06/20  0855 06/05/20  2225   SODIUM mmol/L 137 137 138   POTASSIUM mmol/L 3 9 4 0 3 8   CHLORIDE mmol/L 106 104 104   CO2 mmol/L 23 28 24   ANION GAP mmol/L 8 5 10   BUN mg/dL 13 18 18   CREATININE mg/dL 0 69 0 98 0 80   EGFR ml/min/1 73sq m 105 70 89   CALCIUM mg/dL 8 7 9 0 9 6     Results from last 7 days   Lab Units 06/05/20  2225   AST U/L 17 ALT U/L 6*   ALK PHOS U/L 54   TOTAL PROTEIN g/dL 7 1   ALBUMIN g/dL 4 4   TOTAL BILIRUBIN mg/dL 0 40     Results from last 7 days   Lab Units 06/06/20  1237   POC GLUCOSE mg/dl 141*     Results from last 7 days   Lab Units 06/07/20  0539 06/06/20  0855 06/05/20  2225   GLUCOSE RANDOM mg/dL 99 156* 114*       Results from last 7 days   Lab Units 06/05/20  2225   LIPASE u/L 32       Results from last 7 days   Lab Units 06/06/20  0855   CLARITY UA  Clear   COLOR UA  Yellow   SPEC GRAV UA  1 025   PH UA  6 0   GLUCOSE UA mg/dl Negative   KETONES UA mg/dl 15 (1+)*   BLOOD UA  2+*   PROTEIN UA mg/dl Negative   NITRITE UA  Negative   BILIRUBIN UA  Negative   UROBILINOGEN UA E U /dl 1 0   LEUKOCYTES UA  Negative   WBC UA /hpf 4-10*   RBC UA /hpf 4-10*   BACTERIA UA /hpf Occasional   EPITHELIAL CELLS WET PREP /hpf Occasional     ED Treatment:   Medication Administration from 06/05/2020 2207 to 06/06/2020 0535       Date/Time Order Dose Route Action     06/05/2020 2303 sodium chloride 0 9 % infusion 125 mL/hr Intravenous New Bag     06/05/2020 2304 HYDROmorphone (DILAUDID) injection 1 mg 1 mg Intravenous Given     06/05/2020 2303 ondansetron (ZOFRAN) injection 4 mg 4 mg Intravenous Given     06/05/2020 2357 HYDROmorphone (DILAUDID) injection 1 mg 1 mg Intravenous Given     06/06/2020 0029 scopolamine (TRANSDERM-SCOP) 1 5 mg/3 days TD 72 hr patch 1 patch 1 patch Transdermal Medication Applied     06/06/2020 0237 HYDROmorphone (DILAUDID) injection 1 mg 1 mg Intravenous Given     06/06/2020 0412 ketorolac (TORADOL) injection 30 mg 30 mg Intravenous Not Given     06/06/2020 0442 lidocaine (cardiac) injection 100 mg 100 mg Intravenous Given        Past Medical History:   Diagnosis Date    Abdominal pain     Brain condition     Pseudotumor Cerebri     Chronic kidney disease     De Quervain's disease (tenosynovitis)     Depression 1/10/2019    Esophageal perforation     Gastric leak     GERD (gastroesophageal reflux disease)     Idiopathic intracranial hypertension     Kidney stone     Migraine     No blood products     per pt: personal and [employer]ism beliefs  surgeon office aware 12/13/19    Obesity     Papilledema, both eyes     Postgastrectomy malabsorption     Presence of lumboperitoneal shunt     Resolved: Sep 20, 2017    Rotator cuff tendinitis     Resolved: Aug 23, 2017    Visual field defect        Admitting Diagnosis: Abdominal pain [R10 9]  Acute bilateral obstructive uropathy [N13 9]  Age/Sex: 38 y o  female  Admission Orders:  UP and OOB  Xray abdomen KUB  Urine culture  Scheduled Medications:    Medications:  calcium carbonate-vitamin D 1 tablet Oral BID With Meals   docusate sodium 100 mg Oral BID   ketorolac 30 mg Intravenous Once   multivitamin-minerals 1 tablet Oral Daily   oxybutynin 15 mg Oral Daily   pantoprazole 40 mg Oral BID AC   scopolamine 1 patch Transdermal Q72H   senna 1 tablet Oral HS   tamsulosin 0 4 mg Oral Daily With Dinner     Continuous IV Infusions:    sodium chloride 100 mL/hr Intravenous Continuous     PRN Meds:    acetaminophen 650 mg Oral Q6H PRN   HYDROcodone-acetaminophen 1 tablet Oral Q6H PRN x1   morphine injection 2 mg Intravenous Q4H PRN x5   ondansetron 4 mg Intravenous Q6H PRN x5       IP CONSULT TO UROLOGY    Network Utilization Review Department  [email or web address]  ATTENTION: Please call with any questions or concerns to [phone] and carefully listen to the prompts so that you are directed to the right person  All voicemails are confidential   Gale Bee all requests for admission clinical reviews, approved or denied determinations and any other requests to dedicated fax number below belonging to the campus where the patient is receiving treatment   List of dedicated fax numbers for the Facilities:  FACILITY NAME UR FAX NUMBER   ADMISSION DENIALS (Administrative/Medical Necessity) [phone]   [address] (Maternity/NICU/Pediatrics) [place] [phone]   Ericka Mayorga [phone]   [facility] [phone]   [address] [phone]   [facility]  [phone]   [address] [place] [address], [address]  [address] [facility] [address] [facility] [phone]

## 2020-06-07 NOTE — NURSING NOTE
Pt resting in bed  Complains of 6/10 burning to urethra  Continues to sit on commode for hours at a time  Disease process and treatment plan explained to pt at length, encouraged to practice kegel exercises and use k pad for discomfort, accepts education RT oxybutinin  Has no appetite, did not eat yesterday  Will attempt diet today  Requests to go home today  Personal needs and call bell within reach, will continue to monitor

## 2020-06-08 ENCOUNTER — TRANSITIONAL CARE MANAGEMENT (OUTPATIENT)
Dept: INTERNAL MEDICINE CLINIC | Facility: CLINIC | Age: 45
End: 2020-06-08

## 2020-06-08 ENCOUNTER — TELEPHONE (OUTPATIENT)
Dept: UROLOGY | Facility: MEDICAL CENTER | Age: 45
End: 2020-06-08

## 2020-06-08 DIAGNOSIS — N20.1 URETERAL STONE: Primary | ICD-10-CM

## 2020-06-08 DIAGNOSIS — N20.0 RENAL STONES: Primary | ICD-10-CM

## 2020-06-08 RX ORDER — OXYCODONE HYDROCHLORIDE AND ACETAMINOPHEN 5; 325 MG/1; MG/1
1 TABLET ORAL EVERY 8 HOURS PRN
Qty: 15 TABLET | Refills: 0 | Status: SHIPPED | OUTPATIENT
Start: 2020-06-08 | End: 2020-06-17

## 2020-06-16 ENCOUNTER — HOSPITAL ENCOUNTER (OUTPATIENT)
Dept: RADIOLOGY | Facility: HOSPITAL | Age: 45
Discharge: HOME | End: 2020-06-16
Payer: COMMERCIAL

## 2020-06-16 DIAGNOSIS — N20.0 RENAL STONES: ICD-10-CM

## 2020-06-16 PROCEDURE — 74018 RADEX ABDOMEN 1 VIEW: CPT

## 2020-06-17 ENCOUNTER — TELEPHONE (OUTPATIENT)
Dept: UROLOGY | Facility: MEDICAL CENTER | Age: 45
End: 2020-06-17

## 2020-06-17 ENCOUNTER — OFFICE VISIT (OUTPATIENT)
Dept: UROLOGY | Facility: MEDICAL CENTER | Age: 45
End: 2020-06-17
Payer: COMMERCIAL

## 2020-06-17 VITALS
TEMPERATURE: 98 F | DIASTOLIC BLOOD PRESSURE: 82 MMHG | WEIGHT: 138 LBS | HEIGHT: 64 IN | BODY MASS INDEX: 23.56 KG/M2 | SYSTOLIC BLOOD PRESSURE: 112 MMHG

## 2020-06-17 DIAGNOSIS — N20.0 BILATERAL KIDNEY STONES: ICD-10-CM

## 2020-06-17 DIAGNOSIS — N20.1 URETERAL STONE: Primary | ICD-10-CM

## 2020-06-17 LAB
SL AMB  POCT GLUCOSE, UA: 100
SL AMB LEUKOCYTE ESTERASE,UA: ABNORMAL
SL AMB POCT BILIRUBIN,UA: ABNORMAL
SL AMB POCT BLOOD,UA: ABNORMAL
SL AMB POCT CLARITY,UA: ABNORMAL
SL AMB POCT COLOR,UA: ABNORMAL
SL AMB POCT KETONES,UA: 15
SL AMB POCT NITRITE,UA: POSITIVE
SL AMB POCT PH,UA: 6
SL AMB POCT SPECIFIC GRAVITY,UA: 1.02
SL AMB POCT URINE PROTEIN: >=300
SL AMB POCT UROBILINOGEN: 4

## 2020-06-17 PROCEDURE — 1036F TOBACCO NON-USER: CPT | Performed by: UROLOGY

## 2020-06-17 PROCEDURE — 1111F DSCHRG MED/CURRENT MED MERGE: CPT | Performed by: UROLOGY

## 2020-06-17 PROCEDURE — 81003 URINALYSIS AUTO W/O SCOPE: CPT | Performed by: UROLOGY

## 2020-06-17 PROCEDURE — 3008F BODY MASS INDEX DOCD: CPT | Performed by: UROLOGY

## 2020-06-17 PROCEDURE — 99214 OFFICE O/P EST MOD 30 MIN: CPT | Performed by: UROLOGY

## 2020-06-17 RX ORDER — SULFAMETHOXAZOLE AND TRIMETHOPRIM 800; 160 MG/1; MG/1
1 TABLET ORAL EVERY 12 HOURS SCHEDULED
Qty: 14 TABLET | Refills: 0 | Status: SHIPPED | OUTPATIENT
Start: 2020-06-17 | End: 2020-06-19

## 2020-06-17 RX ORDER — OXYBUTYNIN CHLORIDE 10 MG/1
10 TABLET, EXTENDED RELEASE ORAL
Qty: 15 TABLET | Refills: 0 | Status: SHIPPED | OUTPATIENT
Start: 2020-06-17 | End: 2020-07-30

## 2020-06-18 DIAGNOSIS — Z98.84 BARIATRIC SURGERY STATUS: ICD-10-CM

## 2020-06-18 DIAGNOSIS — K21.00 GASTROESOPHAGEAL REFLUX DISEASE WITH ESOPHAGITIS: ICD-10-CM

## 2020-06-19 RX ORDER — OMEPRAZOLE 40 MG/1
CAPSULE, DELAYED RELEASE ORAL
Qty: 60 CAPSULE | Refills: 12 | Status: SHIPPED | OUTPATIENT
Start: 2020-06-19 | End: 2020-07-23

## 2020-06-22 DIAGNOSIS — N20.1 URETERAL STONE: ICD-10-CM

## 2020-06-22 DIAGNOSIS — Z11.59 SCREENING FOR VIRAL DISEASE: ICD-10-CM

## 2020-06-22 PROCEDURE — U0003 INFECTIOUS AGENT DETECTION BY NUCLEIC ACID (DNA OR RNA); SEVERE ACUTE RESPIRATORY SYNDROME CORONAVIRUS 2 (SARS-COV-2) (CORONAVIRUS DISEASE [COVID-19]), AMPLIFIED PROBE TECHNIQUE, MAKING USE OF HIGH THROUGHPUT TECHNOLOGIES AS DESCRIBED BY CMS-2020-01-R: HCPCS

## 2020-06-23 ENCOUNTER — ANESTHESIA EVENT (OUTPATIENT)
Dept: PERIOP | Facility: HOSPITAL | Age: 45
End: 2020-06-23
Payer: COMMERCIAL

## 2020-06-23 LAB — SARS-COV-2 RNA SPEC QL NAA+PROBE: NOT DETECTED

## 2020-06-24 ENCOUNTER — ANESTHESIA (OUTPATIENT)
Dept: PERIOP | Facility: HOSPITAL | Age: 45
End: 2020-06-24
Payer: COMMERCIAL

## 2020-06-24 ENCOUNTER — HOSPITAL ENCOUNTER (OUTPATIENT)
Facility: HOSPITAL | Age: 45
Setting detail: OUTPATIENT SURGERY
Discharge: HOME | End: 2020-06-24
Attending: UROLOGY | Admitting: UROLOGY
Payer: COMMERCIAL

## 2020-06-24 ENCOUNTER — HOSPITAL ENCOUNTER (OUTPATIENT)
Dept: RADIOLOGY | Facility: HOSPITAL | Age: 45
Setting detail: OUTPATIENT SURGERY
Discharge: HOME | End: 2020-06-24
Payer: COMMERCIAL

## 2020-06-24 ENCOUNTER — APPOINTMENT (OUTPATIENT)
Dept: RADIOLOGY | Facility: HOSPITAL | Age: 45
End: 2020-06-24
Payer: COMMERCIAL

## 2020-06-24 VITALS
BODY MASS INDEX: 23.05 KG/M2 | SYSTOLIC BLOOD PRESSURE: 106 MMHG | WEIGHT: 135 LBS | RESPIRATION RATE: 16 BRPM | OXYGEN SATURATION: 96 % | DIASTOLIC BLOOD PRESSURE: 74 MMHG | TEMPERATURE: 98 F | HEART RATE: 68 BPM | HEIGHT: 64 IN

## 2020-06-24 DIAGNOSIS — N20.0 RENAL CALCULI: Primary | ICD-10-CM

## 2020-06-24 PROCEDURE — 52356 CYSTO/URETERO W/LITHOTRIPSY: CPT | Performed by: UROLOGY

## 2020-06-24 PROCEDURE — 74018 RADEX ABDOMEN 1 VIEW: CPT

## 2020-06-24 PROCEDURE — 74420 UROGRAPHY RTRGR +-KUB: CPT

## 2020-06-24 PROCEDURE — C1758 CATHETER, URETERAL: HCPCS | Performed by: UROLOGY

## 2020-06-24 PROCEDURE — C2617 STENT, NON-COR, TEM W/O DEL: HCPCS | Performed by: UROLOGY

## 2020-06-24 PROCEDURE — C1894 INTRO/SHEATH, NON-LASER: HCPCS | Performed by: UROLOGY

## 2020-06-24 PROCEDURE — C1769 GUIDE WIRE: HCPCS | Performed by: UROLOGY

## 2020-06-24 DEVICE — VARIABLE LENGTH INJECTION STENT SET
Type: IMPLANTABLE DEVICE | Site: URETER | Status: FUNCTIONAL
Brand: CONTOUR VL™ INJECTION STENT SET

## 2020-06-24 RX ORDER — CEFAZOLIN SODIUM 1 G/50ML
1000 SOLUTION INTRAVENOUS ONCE
Status: COMPLETED | OUTPATIENT
Start: 2020-06-24 | End: 2020-06-24

## 2020-06-24 RX ORDER — GENTAMICIN SULFATE 80 MG/50ML
1.5 INJECTION, SOLUTION INTRAVENOUS ONCE
Status: COMPLETED | OUTPATIENT
Start: 2020-06-24 | End: 2020-06-24

## 2020-06-24 RX ORDER — OXYBUTYNIN CHLORIDE 10 MG/1
10 TABLET, EXTENDED RELEASE ORAL
Qty: 7 TABLET | Refills: 0 | Status: SHIPPED | OUTPATIENT
Start: 2020-06-24 | End: 2020-07-30

## 2020-06-24 RX ORDER — ONDANSETRON 2 MG/ML
4 INJECTION INTRAMUSCULAR; INTRAVENOUS ONCE AS NEEDED
Status: DISCONTINUED | OUTPATIENT
Start: 2020-06-24 | End: 2020-06-24 | Stop reason: HOSPADM

## 2020-06-24 RX ORDER — LIDOCAINE HYDROCHLORIDE 10 MG/ML
INJECTION, SOLUTION EPIDURAL; INFILTRATION; INTRACAUDAL; PERINEURAL AS NEEDED
Status: DISCONTINUED | OUTPATIENT
Start: 2020-06-24 | End: 2020-06-24 | Stop reason: SURG

## 2020-06-24 RX ORDER — ROCURONIUM BROMIDE 10 MG/ML
INJECTION, SOLUTION INTRAVENOUS AS NEEDED
Status: DISCONTINUED | OUTPATIENT
Start: 2020-06-24 | End: 2020-06-24 | Stop reason: SURG

## 2020-06-24 RX ORDER — FENTANYL CITRATE/PF 50 MCG/ML
25 SYRINGE (ML) INJECTION
Status: DISCONTINUED | OUTPATIENT
Start: 2020-06-24 | End: 2020-06-24 | Stop reason: HOSPADM

## 2020-06-24 RX ORDER — CEFAZOLIN SODIUM 1 G/50ML
SOLUTION INTRAVENOUS AS NEEDED
Status: DISCONTINUED | OUTPATIENT
Start: 2020-06-24 | End: 2020-06-24 | Stop reason: SURG

## 2020-06-24 RX ORDER — FENTANYL CITRATE 50 UG/ML
INJECTION, SOLUTION INTRAMUSCULAR; INTRAVENOUS AS NEEDED
Status: DISCONTINUED | OUTPATIENT
Start: 2020-06-24 | End: 2020-06-24 | Stop reason: SURG

## 2020-06-24 RX ORDER — HEPARIN SODIUM 5000 [USP'U]/ML
5000 INJECTION, SOLUTION INTRAVENOUS; SUBCUTANEOUS ONCE
Status: COMPLETED | OUTPATIENT
Start: 2020-06-24 | End: 2020-06-24

## 2020-06-24 RX ORDER — KETOROLAC TROMETHAMINE 30 MG/ML
INJECTION, SOLUTION INTRAMUSCULAR; INTRAVENOUS AS NEEDED
Status: DISCONTINUED | OUTPATIENT
Start: 2020-06-24 | End: 2020-06-24 | Stop reason: SURG

## 2020-06-24 RX ORDER — SULFAMETHOXAZOLE AND TRIMETHOPRIM 800; 160 MG/1; MG/1
1 TABLET ORAL DAILY
Qty: 7 TABLET | Refills: 0 | Status: SHIPPED | OUTPATIENT
Start: 2020-06-24 | End: 2020-07-01

## 2020-06-24 RX ORDER — HYDROMORPHONE HCL/PF 1 MG/ML
0.5 SYRINGE (ML) INJECTION
Status: DISCONTINUED | OUTPATIENT
Start: 2020-06-24 | End: 2020-06-24 | Stop reason: HOSPADM

## 2020-06-24 RX ORDER — OXYCODONE HYDROCHLORIDE AND ACETAMINOPHEN 5; 325 MG/1; MG/1
1 TABLET ORAL EVERY 4 HOURS PRN
Qty: 20 TABLET | Refills: 0 | Status: SHIPPED | OUTPATIENT
Start: 2020-06-24 | End: 2020-07-09

## 2020-06-24 RX ORDER — PROPOFOL 10 MG/ML
INJECTION, EMULSION INTRAVENOUS AS NEEDED
Status: DISCONTINUED | OUTPATIENT
Start: 2020-06-24 | End: 2020-06-24 | Stop reason: SURG

## 2020-06-24 RX ORDER — MORPHINE SULFATE 4 MG/ML
4 INJECTION, SOLUTION INTRAMUSCULAR; INTRAVENOUS EVERY 6 HOURS PRN
Status: DISCONTINUED | OUTPATIENT
Start: 2020-06-24 | End: 2020-06-24 | Stop reason: HOSPADM

## 2020-06-24 RX ORDER — DEXAMETHASONE SODIUM PHOSPHATE 10 MG/ML
INJECTION, SOLUTION INTRAMUSCULAR; INTRAVENOUS AS NEEDED
Status: DISCONTINUED | OUTPATIENT
Start: 2020-06-24 | End: 2020-06-24 | Stop reason: SURG

## 2020-06-24 RX ORDER — GLYCOPYRROLATE 0.2 MG/ML
INJECTION INTRAMUSCULAR; INTRAVENOUS AS NEEDED
Status: DISCONTINUED | OUTPATIENT
Start: 2020-06-24 | End: 2020-06-24 | Stop reason: SURG

## 2020-06-24 RX ORDER — MAGNESIUM HYDROXIDE 1200 MG/15ML
LIQUID ORAL AS NEEDED
Status: DISCONTINUED | OUTPATIENT
Start: 2020-06-24 | End: 2020-06-24 | Stop reason: HOSPADM

## 2020-06-24 RX ORDER — SODIUM CHLORIDE 9 MG/ML
INJECTION, SOLUTION INTRAVENOUS AS NEEDED
Status: DISCONTINUED | OUTPATIENT
Start: 2020-06-24 | End: 2020-06-24 | Stop reason: HOSPADM

## 2020-06-24 RX ORDER — SODIUM CHLORIDE 9 MG/ML
INJECTION, SOLUTION INTRAVENOUS CONTINUOUS PRN
Status: DISCONTINUED | OUTPATIENT
Start: 2020-06-24 | End: 2020-06-24 | Stop reason: SURG

## 2020-06-24 RX ORDER — SODIUM CHLORIDE 9 MG/ML
125 INJECTION, SOLUTION INTRAVENOUS CONTINUOUS
Status: DISCONTINUED | OUTPATIENT
Start: 2020-06-24 | End: 2020-06-24 | Stop reason: HOSPADM

## 2020-06-24 RX ORDER — ONDANSETRON 2 MG/ML
INJECTION INTRAMUSCULAR; INTRAVENOUS AS NEEDED
Status: DISCONTINUED | OUTPATIENT
Start: 2020-06-24 | End: 2020-06-24 | Stop reason: SURG

## 2020-06-24 RX ORDER — OXYCODONE HYDROCHLORIDE AND ACETAMINOPHEN 5; 325 MG/1; MG/1
1 TABLET ORAL EVERY 4 HOURS PRN
Status: DISCONTINUED | OUTPATIENT
Start: 2020-06-24 | End: 2020-06-24 | Stop reason: HOSPADM

## 2020-06-24 RX ADMIN — FENTANYL CITRATE 100 MCG: 50 INJECTION, SOLUTION INTRAMUSCULAR; INTRAVENOUS at 13:34

## 2020-06-24 RX ADMIN — ROCURONIUM BROMIDE 30 MG: 10 INJECTION, SOLUTION INTRAVENOUS at 13:43

## 2020-06-24 RX ADMIN — FENTANYL CITRATE 25 MCG: 50 INJECTION, SOLUTION INTRAMUSCULAR; INTRAVENOUS at 15:34

## 2020-06-24 RX ADMIN — SODIUM CHLORIDE: 0.9 INJECTION, SOLUTION INTRAVENOUS at 13:11

## 2020-06-24 RX ADMIN — PHENYLEPHRINE HYDROCHLORIDE 100 MCG: 10 INJECTION INTRAVENOUS at 13:58

## 2020-06-24 RX ADMIN — KETOROLAC TROMETHAMINE 30 MG: 30 INJECTION, SOLUTION INTRAMUSCULAR at 14:48

## 2020-06-24 RX ADMIN — GENTAMICIN SULFATE 80 MG: 80 INJECTION, SOLUTION INTRAVENOUS at 13:57

## 2020-06-24 RX ADMIN — HYDROMORPHONE HYDROCHLORIDE 0.5 MG: 1 INJECTION, SOLUTION INTRAMUSCULAR; INTRAVENOUS; SUBCUTANEOUS at 16:16

## 2020-06-24 RX ADMIN — FENTANYL CITRATE 25 MCG: 50 INJECTION, SOLUTION INTRAMUSCULAR; INTRAVENOUS at 15:45

## 2020-06-24 RX ADMIN — PROPOFOL 200 MG: 10 INJECTION, EMULSION INTRAVENOUS at 13:43

## 2020-06-24 RX ADMIN — HYDROMORPHONE HYDROCHLORIDE 0.5 MG: 1 INJECTION, SOLUTION INTRAMUSCULAR; INTRAVENOUS; SUBCUTANEOUS at 15:56

## 2020-06-24 RX ADMIN — FENTANYL CITRATE 25 MCG: 50 INJECTION, SOLUTION INTRAMUSCULAR; INTRAVENOUS at 15:39

## 2020-06-24 RX ADMIN — HEPARIN SODIUM 5000 UNITS: 5000 INJECTION INTRAVENOUS; SUBCUTANEOUS at 13:10

## 2020-06-24 RX ADMIN — ONDANSETRON 4 MG: 2 INJECTION INTRAMUSCULAR; INTRAVENOUS at 14:54

## 2020-06-24 RX ADMIN — GLYCOPYRROLATE 0.2 MG: 0.2 INJECTION, SOLUTION INTRAMUSCULAR; INTRAVENOUS at 13:30

## 2020-06-24 RX ADMIN — SODIUM CHLORIDE 125 ML/HR: 0.9 INJECTION, SOLUTION INTRAVENOUS at 13:24

## 2020-06-24 RX ADMIN — LIDOCAINE HYDROCHLORIDE 100 MG: 10 INJECTION, SOLUTION EPIDURAL; INFILTRATION; INTRACAUDAL; PERINEURAL at 13:43

## 2020-06-24 RX ADMIN — DEXAMETHASONE SODIUM PHOSPHATE 4 MG: 10 INJECTION, SOLUTION INTRAMUSCULAR; INTRAVENOUS at 14:54

## 2020-06-24 RX ADMIN — CEFAZOLIN SODIUM 1000 MG: 1 SOLUTION INTRAVENOUS at 13:27

## 2020-06-24 RX ADMIN — HYDROMORPHONE HYDROCHLORIDE 0.5 MG: 1 INJECTION, SOLUTION INTRAMUSCULAR; INTRAVENOUS; SUBCUTANEOUS at 16:06

## 2020-06-24 RX ADMIN — CEFAZOLIN SODIUM 1000 MG: 1 SOLUTION INTRAVENOUS at 13:34

## 2020-06-26 ENCOUNTER — TELEPHONE (OUTPATIENT)
Dept: UROLOGY | Facility: AMBULATORY SURGERY CENTER | Age: 45
End: 2020-06-26

## 2020-06-26 NOTE — TELEPHONE ENCOUNTER
Patient managed by Sobia Hummel is calling for appointment for stent removal  Please call [phone]

## 2020-07-01 ENCOUNTER — PROCEDURE VISIT (OUTPATIENT)
Dept: UROLOGY | Facility: MEDICAL CENTER | Age: 45
End: 2020-07-01
Payer: COMMERCIAL

## 2020-07-01 VITALS
TEMPERATURE: 98.2 F | DIASTOLIC BLOOD PRESSURE: 64 MMHG | SYSTOLIC BLOOD PRESSURE: 128 MMHG | BODY MASS INDEX: 22.53 KG/M2 | WEIGHT: 132 LBS | HEIGHT: 64 IN

## 2020-07-01 DIAGNOSIS — N20.0 CALCULUS OF KIDNEY: Primary | ICD-10-CM

## 2020-07-01 DIAGNOSIS — R30.0 DYSURIA: Primary | ICD-10-CM

## 2020-07-01 PROCEDURE — 1111F DSCHRG MED/CURRENT MED MERGE: CPT

## 2020-07-01 PROCEDURE — 3008F BODY MASS INDEX DOCD: CPT

## 2020-07-01 PROCEDURE — 99211 OFF/OP EST MAY X REQ PHY/QHP: CPT

## 2020-07-01 RX ORDER — PHENAZOPYRIDINE HYDROCHLORIDE 100 MG/1
100 TABLET, FILM COATED ORAL 3 TIMES DAILY PRN
Qty: 90 TABLET | Refills: 1 | Status: SHIPPED | OUTPATIENT
Start: 2020-07-01 | End: 2020-07-30

## 2020-07-01 NOTE — PROGRESS NOTES
7/1/2020  Tabitha Moran is a 38 y o  female  [id number]        Diagnosis  Chief Complaint     Nephrolithiasis          Patient is s/p CYSTOSCOPY URETEROSCOPY WITH LITHOTRIPSY HOLMIUM LASER, RETROGRADE PYELOGRAM AND exchange bilateral  STENTs URETERAL on 6/24/20   with Dr Nelson Nicolas  Return to office in 6 weeks for FU with Provider with 24 hour urine results        Procedure Stent with String Removal    Vitals:    07/01/20 1330   BP: 128/64   Temp: 98 2 °F (36 8 °C)   Weight: 59 9 kg (132 lb)   Height: 5' 4" (1 626 m)       Stent with string removed intact without difficulty  Reviewed post stent removal symptoms including flank pain, dysuria, and hematuria  Instructed patient to increase oral fluid intake  Encouraged the use of NSAIDS and other prescribed pain medication as needed for discomfort  Patient instructed to call the office or report to the ER for uncontrolled pain, fever, chills, nausea or vomiting         Valentina Montesinos RN

## 2020-07-23 DIAGNOSIS — Z98.84 BARIATRIC SURGERY STATUS: ICD-10-CM

## 2020-07-23 DIAGNOSIS — K21.00 GASTROESOPHAGEAL REFLUX DISEASE WITH ESOPHAGITIS: ICD-10-CM

## 2020-07-23 RX ORDER — OMEPRAZOLE 40 MG/1
CAPSULE, DELAYED RELEASE ORAL
Qty: 60 CAPSULE | Refills: 12 | Status: SHIPPED | OUTPATIENT
Start: 2020-07-23 | End: 2020-09-24

## 2020-07-27 ENCOUNTER — HOSPITAL ENCOUNTER (OUTPATIENT)
Dept: CT IMAGING | Facility: HOSPITAL | Age: 45
Discharge: HOME | End: 2020-07-27
Payer: COMMERCIAL

## 2020-07-27 DIAGNOSIS — Z48.89 AFTERCARE FOLLOWING SURGERY: ICD-10-CM

## 2020-07-27 PROCEDURE — 70450 CT HEAD/BRAIN W/O DYE: CPT

## 2020-07-30 ENCOUNTER — OFFICE VISIT (OUTPATIENT)
Dept: NEUROSURGERY | Facility: CLINIC | Age: 45
End: 2020-07-30
Payer: COMMERCIAL

## 2020-07-30 VITALS
HEIGHT: 64 IN | RESPIRATION RATE: 16 BRPM | BODY MASS INDEX: 22.02 KG/M2 | HEART RATE: 56 BPM | DIASTOLIC BLOOD PRESSURE: 80 MMHG | SYSTOLIC BLOOD PRESSURE: 151 MMHG | WEIGHT: 129 LBS | TEMPERATURE: 98.3 F

## 2020-07-30 DIAGNOSIS — G93.2 PSEUDOTUMOR CEREBRI: Primary | ICD-10-CM

## 2020-07-30 DIAGNOSIS — Z98.2 S/P VP SHUNT: ICD-10-CM

## 2020-07-30 PROCEDURE — 1111F DSCHRG MED/CURRENT MED MERGE: CPT | Performed by: NEUROLOGICAL SURGERY

## 2020-07-30 PROCEDURE — 3008F BODY MASS INDEX DOCD: CPT | Performed by: NEUROLOGICAL SURGERY

## 2020-07-30 PROCEDURE — 99212 OFFICE O/P EST SF 10 MIN: CPT | Performed by: NEUROLOGICAL SURGERY

## 2020-07-30 PROCEDURE — 1036F TOBACCO NON-USER: CPT | Performed by: NEUROLOGICAL SURGERY

## 2020-07-30 NOTE — PROGRESS NOTES
DISCUSSION SUMMARY  This is a 38 y o  female with a history of pseudotumor cerebri requiring a shunt  Currently she has a ventricular atrial shunt in place  Her imaging studies are now normal   She is asymptomatic  Will plan on seeing her back on an as-needed basis  No follow-ups on file  Diagnosis ICD-10-CM Associated Orders   1  Pseudotumor cerebri  G93 2    2  S/P  shunt  Z98 2           Reason for Visit:    Follow-up (6 months w/CTH)       HPI:    Denies headaches, nausea or vomiting  She is here for routine shunt check  The sensation in her eye is unusual and I do not have a clear explanation for it  REVIEW OF SYSTEMS  Review of Systems   Constitutional: Negative  HENT: Negative  Eyes: Negative  Respiratory: Negative  Cardiovascular: Negative  Gastrointestinal: Negative  Endocrine: Negative  Genitourinary: Negative  Musculoskeletal: Negative  Skin: Negative  Allergic/Immunologic: Negative  Neurological: Positive for numbness (under chin) and headaches  Weird sensation in the right eye close to where the shunt is   Hematological: Negative  Psychiatric/Behavioral: Negative  All other systems reviewed and are negative  I reviewed the ROS  I reviewed the ROS  Vitals:    /80 (BP Location: Left arm, Patient Position: Sitting, Cuff Size: Standard)   Pulse 56   Temp 98 3 °F (36 8 °C) (Probe)   Resp 16   Ht 5' 4" (1 626 m)   Wt 58 5 kg (129 lb)   LMP  (LMP Unknown)   BMI 22 14 kg/m²     MEDICAL HISTORY  Past Medical History:   Diagnosis Date    Abdominal pain     Brain condition     Pseudotumor Cerebri     Chronic kidney disease     De Quervain's disease (tenosynovitis)     Esophageal perforation     Gastric leak     GERD (gastroesophageal reflux disease)     Headache     Idiopathic intracranial hypertension     Kidney stone     Migraine     No blood products     per pt: personal and Yazdanism beliefs   surgeon office aware 12/13/19    Papilledema, both eyes     PONV (postoperative nausea and vomiting)     Postgastrectomy malabsorption     Presence of lumboperitoneal shunt     Resolved: Sep 20, 2017    Rotator cuff tendinitis     Resolved: Aug 23, 2017    Visual field defect      Past Surgical History:   Procedure Laterality Date    CSF SHUNT      LP shunt - 2015 -  shunt - 2017    ESOPHAGOGASTRODUODENOSCOPY N/A 2/23/2018    Procedure: ESOPHAGOGASTRODUODENOSCOPY (EGD) WITH ESOPHAGEAL STENT PLACEMENT;  Surgeon: Charline Sauceda MD;  [facility];  Service: Thoracic    ESOPHAGOGASTRODUODENOSCOPY N/A 2/23/2018    Procedure: ESOPHAGOGASTRODUODENOSCOPY (EGD) WITH REMOVAL ESOPHAGEAL STENT  AND REPLACEMENT WITH 23mm X155mm [address];  Surgeon: Charline Sauceda MD;  [facility];  Service: Thoracic    ESOPHAGOGASTRODUODENOSCOPY N/A 3/28/2018    Procedure: ESOPHAGOGASTRODUODENOSCOPY (EGD) with PEJ placement ;  Surgeon: Earl Goodwin MD;  [facility]; Service: Gastroenterology    ESOPHAGOGASTRODUODENOSCOPY N/A 4/5/2018    Procedure: ESOPHAGOGASTRODUODENOSCOPY (EGD) with botox injection and kaofed placement;  Surgeon: Lieutenant Daniella DO;  [facility]; Service: Gastroenterology    ESOPHAGOGASTRODUODENOSCOPY N/A 4/10/2018    Procedure: ESOPHAGOGASTRODUODENOSCOPY (EGD) with Kaofed placement;  Surgeon: Sabrina Murphy MD;  [facility]; Service: Gastroenterology    ESOPHAGOSCOPY WITH STENT INSERTION N/A 1/24/2018    Procedure: INSERTION STENT ESOPHAGEAL;  Surgeon: Ela Galloway MD;  [facility];   Service: Gastroenterology    EYE SURGERY Bilateral 1980    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)     FL RETROGRADE PYELOGRAM  6/24/2020    GASTRIC BYPASS  12/19/2016    Lap sleeve gastrectomy w/shunt length shortening procedure    HIATAL HERNIA REPAIR      HYSTERECTOMY  03/14/2013    IR LUMBAR PUNCTURE  8/29/2018    LAPAROTOMY N/A 1/25/2018    Procedure: Exploratory Laparotomy, wash out,placement of drains, placement of NG feeding tube ; Surgeon: Shahab Hays DO;  [facility];  Service: General    LUMBAR PERITONEAL SHUNT  11/19/2015    Laparoscopic assisted    MS CREATE SHUNT:VENTRIC-ATRIAL Right 12/18/2019    Procedure: IMAGE GUIDED INSERTION OF RIGHT CORONAL VENTRICULAR-ATRIAL SHUNT;  Surgeon: Jitendra Rodriguez MD;  [facility];  Service: Neurosurgery    MS CREATE SHUNT:VENTRIC-PERITONEAL Right 5/31/2017    Procedure: IMAGE GUIDED CORONAL PLACEMENT OF PROGRAMABLE VENTRICULAR-PERITONEAL SHUNT, REMOVAL OF LP SHUNT ;  Surgeon: Jitendra Rodriguez MD;  [facility];  Service: Neurosurgery    MS CYSTO/URETERO W/LITHOTRIPSY &INDWELL STENT INSRT Bilateral 6/24/2020    Procedure: CYSTOSCOPY URETEROSCOPY WITH LITHOTRIPSY HOLMIUM LASER, RETROGRADE PYELOGRAM AND exchange bilateral  STENTs URETERAL;  Surgeon: Anand Avelar MD;  [facility];  Service: Urology    MS CYSTOURETHROSCOPY,URETER CATHETER N/A 6/6/2020    Procedure: Bilateral CYSTOSCOPY RETROGRADE PYELOGRAM WITH INSERTION STENT URETERAL;  Surgeon: Angela Apodaca MD;  Location: [facility] MAIN OR;  Service: Urology    MS EGD TRANSORAL BIOPSY SINGLE/MULTIPLE N/A 6/27/2018    Procedure: ESOPHAGOGASTRODUODENOSCOPY (EGD) with padlock clip placement;  Surgeon: Ruben Luther MD;  [facility];   Service: Lu Loupe CSF SHUNT,W/O REPLACE Right 2/5/2018    Procedure: Removal of  shunt;  Surgeon: Jitendra Rodriguez MD;  [facility];  Service: Neurosurgery    MS REPLACEMENT/REVISION,CSF SHUNT Right 1/25/2018    Procedure: Externalization of right-sided SHUNT VENTRICULAR-PERITONEAL in anterior chest wall ribs two and three level  ;  Surgeon: Swapna Cowart MD;  [facility];  Service: Neurosurgery    WRIST SURGERY Right     x3 2006, 2008     Social History     Tobacco Use    Smoking status: Former Smoker     Packs/day: 0 50     Years: 20 00 Pack years: 10 00     Last attempt to quit: 2012     Years since quittin 9    Smokeless tobacco: Never Used   Substance Use Topics    Alcohol use: Never     Frequency: Never     Binge frequency: Never    Drug use: Yes     Frequency: 7 0 times per week     Types: Marijuana     Comment: medical marijuana, vaping & topical         Current Outpatient Medications:     calcium citrate-vitamin D (CITRACAL+D) 315-200 MG-UNIT per tablet, Take 1 tablet by mouth 2 (two) times a day, Disp: , Rfl:     Multiple Vitamin (MULTIVITAMIN) tablet, Take 1 tablet by mouth daily Wears a patch, Disp: , Rfl:     NON FORMULARY, , Disp: , Rfl:     omeprazole (PriLOSEC) 40 MG capsule, TAKE ONE CAPSULE BY MOUTH TWICE DAILY, Disp: 60 capsule, Rfl: 12     Allergies   Allergen Reactions    Benadryl [Diphenhydramine] Anaphylaxis     Throat closing    Phenergan [Promethazine] Anaphylaxis        The following portions of the patient's history were updated by MA and reviewed by MD: allergies, current medications, past family history, past medical history, past social history, past surgical history and problem list     Physical Exam  Awake and alert  Speech is clear and comprehensible  Her extraocular movements are intact  Her pupils are equal round reactive to light and accommodate  Her facial expression is intact in grimace and at rest  Her power is 5/5 bilaterally  Her station and gait are normal    RESULTS/DATA  I have personally reviewed pertinent films in PACS   CT of the head demonstrates the ventricular size to be appropriate with no changes    This is her normal baseline image

## 2020-09-24 ENCOUNTER — OFFICE VISIT (OUTPATIENT)
Dept: BARIATRICS | Facility: CLINIC | Age: 45
End: 2020-09-24
Payer: COMMERCIAL

## 2020-09-24 VITALS
SYSTOLIC BLOOD PRESSURE: 126 MMHG | TEMPERATURE: 98 F | BODY MASS INDEX: 23.83 KG/M2 | HEART RATE: 45 BPM | WEIGHT: 134.5 LBS | DIASTOLIC BLOOD PRESSURE: 70 MMHG | HEIGHT: 63 IN

## 2020-09-24 DIAGNOSIS — K21.9 GASTROESOPHAGEAL REFLUX DISEASE WITHOUT ESOPHAGITIS: Primary | ICD-10-CM

## 2020-09-24 DIAGNOSIS — Z48.815 ENCOUNTER FOR SURGICAL AFTERCARE FOLLOWING SURGERY OF DIGESTIVE SYSTEM: ICD-10-CM

## 2020-09-24 DIAGNOSIS — K91.2 POSTGASTRECTOMY MALABSORPTION: Chronic | ICD-10-CM

## 2020-09-24 DIAGNOSIS — R12 HEART BURN: ICD-10-CM

## 2020-09-24 DIAGNOSIS — Z90.3 POSTGASTRECTOMY MALABSORPTION: Chronic | ICD-10-CM

## 2020-09-24 PROCEDURE — 99214 OFFICE O/P EST MOD 30 MIN: CPT | Performed by: PHYSICIAN ASSISTANT

## 2020-09-24 PROCEDURE — 1036F TOBACCO NON-USER: CPT | Performed by: PHYSICIAN ASSISTANT

## 2020-09-24 NOTE — ASSESSMENT & PLAN NOTE
-s/p Vertical Sleeve Gastrectomy with  Dr Goddard Later   in [id number] was complicated by a leak at the GE junction with open abdomen and required padlock device for endoscopic closure of fistula tract  Complicated hospital course through out 2018 for the leak and had required naso jejunal tube feedings which were subsequently discontinued  Overall doing Well with her weight loss  She continues to have heart burn with certain foods but notes somewhat improved since having [address] in 2/2020  She notes she has abdominal wall pain at times with exercise  Has had abdominal imaging recently for renal stone studies which were noted    Initial:133 lb-in our office 6/22/2018  Current:134 5 lb    Keron:130 lb  Current BMI is Body mass index is 23 83 kg/m²      Tolerating a regular diet-yes  Eating at least 60 grams of protein per day-yes  Following 30/60 minute rule with liquids-yes  Drinking at least 64 ounces of fluid per day-yes  Drinking carbonated beverages-yes  Sufficient exercise-some limited exercise as she complains of muscle weakness-encouraged her to review this with her PCP  Using NSAIDs regularly-no  Using nicotine-no  Using alcohol-no

## 2020-09-24 NOTE — PATIENT INSTRUCTIONS
It was great to see you again today  Take dexilant 60 mg once daily before breakfast instead of the 40 mg of omeprazole twice a day  For heart burn control  Avoid more than 2 cups of caffeine per day-regular coffee/tea  If you are drinking more than this gradually decrease the amount until it is less than 2 cups per day  Raise the head of the bed up especially if you have symptoms at night  You can do this by using a wedge pillow at the top of the bed OR using bed posts at the head of the bed only-keeping your body at a slant-so that your head is above your stomach all night long  Avoid eating and drinking within 2 hours of laying flat  Avoid chocolate, highly spicy foods, chili powder, pepper, peppers, high citrus foods-like tomato/tomato paste, high onion, high garlic intakes, and avoid alcohol    Follow-up with Dr Chhaya Jack in 6 months  Follow-up in one year with me  We kindly ask that you arrive 15 minutes before your scheduled appointment time with your provider to allow you to be roomed, have your vital signs checked and your chart updated by our staff  We thank you for your patience at your visit  Your appointment time is reserved only for you  If you are unable to make your scheduled visit, we would request that you call to cancel and reschedule it at your earliest convenience  Follow diet as discussed  Follow  vitamin and mineral recommendations as reviewed with you  Bariatric vitamins are highly recommended  Vitamins are important for a life-time  Low levels can lead to deficiency which can lead to other medical problems  Exercise as tolerated    If you have gotten a lab slip at this visit, please note that most labs are FASTING-but you need to drink water the night before and the morning before your labs are done  It is HIGHLY RECOMMENDED that you check with your insurance to make sure all the labs ordered are covered by your individual insurance policy    This is especially important if you also get labs done by other providers outside of [facility]  You want to avoid having duplicate labs done  Note you will be given a lab slip AFTER  your annual visit next year to check your vitamin and mineral levels  You will not need to make a second appointment after the labs are received/reviewed  You will receive a letter/and or phone call with your results  Most labs do NOT honor a lab slip dated one year in advance now  IMPORTANT if you have a [facility] "[facility]" account, you will receive a letter of your vitamin/mineral results through the computer  Please watch for an update to your chart-since recommendations for supplement adjustments will be sent to you this way  Call our office if he have any problems with abdominal pain especially if associated with fever, chills, nausea, vomiting or any other concerns  All  Post-bariatric surgery patients should be aware that very small quantities of any alcohol  can cause impairment and it is very possible not to feel the effect  The effect can be in the system for several hours  It is also a stomach irritant  It is advised to AVOID alcohol, Nonsteroidal antiinflammatory drugs (NSAIDS) and nicotine of all forms   Any of these can cause stomach irritation/pain

## 2020-09-24 NOTE — ASSESSMENT & PLAN NOTE
She had EGD with stretta in February 2020 for her GERD symptoms  She notes overall symptoms have shown some improvement but notes she continues to have symptoms with certain foods-like tomato sauce, garlic, peanut butter  She has been taking 40 mg of omeprazole twice daily (not once daily as originally advised)   She notes with break-through symptoms she has been taking samara seltzer and advised to avoid the samara seltzer and we will trial dexilant 60 mg once daily instead of the omeprazole twice daily-  She is agreeable to the plan  Advised on diet changes and life-style changes to help with GERD control-written instructions provided    Also reviewed with Dr Florentino Favre and he is agreeable to the plan  Will have her follow-up with him in 6 months as original plan was to consider repeat Stretta then  She is agreeable to same  Advised patient we likely need to prior auth her dexilant and will see what the requirements for this are for her

## 2020-09-24 NOTE — ASSESSMENT & PLAN NOTE
Malabsorption- patient is at risk for malabsorption of vitamins/minerals secondary to malabsorption from her procedure and restriction of intakes  Reviewed current supplements and advised on same    She is taking patch md mvi,calcium with D3    She notes she had her vitamin/mineral levels done at her PCP's office last year but advised her I did not receive labs back  She will get them done now -ordering same

## 2020-09-24 NOTE — PROGRESS NOTES
Assessment/Plan:    Postgastrectomy malabsorption  Malabsorption- patient is at risk for malabsorption of vitamins/minerals secondary to malabsorption from her procedure and restriction of intakes  Reviewed current supplements and advised on same    She is taking patch md mvi,calcium with D3    She notes she had her vitamin/mineral levels done at her PCP's office last year but advised her I did not receive labs back  She will get them done now -ordering same  Gastroesophageal reflux disease  She had EGD with stretta in February 2020 for her GERD symptoms  She notes overall symptoms have shown some improvement but notes she continues to have symptoms with certain foods-like tomato sauce, garlic, peanut butter  She has been taking 40 mg of omeprazole twice daily (not once daily as originally advised)   She notes with break-through symptoms she has been taking samara seltzer and advised to avoid the samara seltzer and we will trial dexilant 60 mg once daily instead of the omeprazole twice daily-  She is agreeable to the plan  Advised on diet changes and life-style changes to help with GERD control-written instructions provided    Also reviewed with Dr Dona Max and he is agreeable to the plan  Will have her follow-up with him in 6 months as original plan was to consider repeat Stretta then  She is agreeable to same  Advised patient we likely need to prior auth her dexilant and will see what the requirements for this are for her  Encounter for surgical aftercare following surgery of digestive system  -s/p Vertical Sleeve Gastrectomy with  Dr Madina Vences   in [id number] was complicated by a leak at the GE junction with open abdomen and required padlock device for endoscopic closure of fistula tract  Complicated hospital course through out 2018 for the leak and had required naso jejunal tube feedings which were subsequently discontinued    Overall doing Well with her weight loss  She continues to have heart burn with certain foods but notes somewhat improved since having [address] in 2/2020  She notes she has abdominal wall pain at times with exercise  Has had abdominal imaging recently for renal stone studies which were noted    Initial:133 lb-in our office 6/22/2018  Current:134 5 lb    Keron:130 lb  Current BMI is Body mass index is 23 83 kg/m²  Tolerating a regular diet-yes  Eating at least 60 grams of protein per day-yes  Following 30/60 minute rule with liquids-yes  Drinking at least 64 ounces of fluid per day-yes  Drinking carbonated beverages-yes  Sufficient exercise-some limited exercise as she complains of muscle weakness-encouraged her to review this with her PCP  Using NSAIDs regularly-no  Using nicotine-no  Using alcohol-no         Diagnoses and all orders for this visit:    Gastroesophageal reflux disease without esophagitis  -     CBC and Platelet; Future  -     Comprehensive metabolic panel; Future  -     Copper Level; Future  -     Ferritin; Future  -     Folate; Future  -     Iron Saturation %; Future  -     PTH, intact; Future  -     Vitamin A; Future  -     Vitamin B1, whole blood; Future  -     Vitamin B12; Future  -     Vitamin D 25 hydroxy; Future  -     Zinc; Future    Postgastrectomy malabsorption  -     CBC and Platelet; Future  -     Comprehensive metabolic panel; Future  -     Copper Level; Future  -     Ferritin; Future  -     Folate; Future  -     Iron Saturation %; Future  -     PTH, intact; Future  -     Vitamin A; Future  -     Vitamin B1, whole blood; Future  -     Vitamin B12; Future  -     Vitamin D 25 hydroxy; Future  -     Zinc; Future    Encounter for surgical aftercare following surgery of digestive system  -     CBC and Platelet; Future  -     Comprehensive metabolic panel; Future  -     Copper Level; Future  -     Ferritin; Future  -     Folate; Future  -     Iron Saturation %; Future  -     PTH, intact;  Future  -     Vitamin A; Future  -     Vitamin B1, whole blood; Future  -     Vitamin B12; Future  -     Vitamin D 25 hydroxy; Future  -     Zinc; Future    Heart burn  Comments:  see under GERD  Orders:  -     dexlansoprazole (Dexilant) 60 MG capsule; Take 1 capsule (60 mg total) by mouth daily  -     CBC and Platelet; Future  -     Comprehensive metabolic panel; Future  -     Copper Level; Future  -     Ferritin; Future  -     Folate; Future  -     Iron Saturation %; Future  -     PTH, intact; Future  -     Vitamin A; Future  -     Vitamin B1, whole blood; Future  -     Vitamin B12; Future  -     Vitamin D 25 hydroxy; Future  -     Zinc; Future          Subjective:      Patient ID: Renaat Alatorre is a 54 y o  female  She is status post laparoscopic sleeve gastrectomy which was complicated by gastric leak-requiring an open abdomen and use of padlock device for closure of fistula in 2018 by Dr Candelario Reeves  She had an EGD with stretta earlier this year by Dr Joelle Jean Baptiste for chronic heart burn  She feels symptoms have improved to some degree but she notes she is taking 40 mg of omeprazole twice daily with some break through symptoms with certain foods  She had some kidney stones over the summer as well  She is tolerating a regular diet and is maintaining her weight from last year  She is using vitamin patches and stays active but notes muscle weakness since her surgery in 2018  She notes with vaccuming she gets abdominal wall discomfort which is not new for her and had imaging for her kidney stones in June which was unrevealing for any other acute problem      The following portions of the patient's history were reviewed and updated as appropriate: allergies, current medications, past family history, past medical history, past social history, past surgical history and problem list     Review of Systems   Constitutional: Negative for chills and fever  Unexpected weight change: planned weight loss  Respiratory: Negative for shortness of breath and wheezing  Cardiovascular: Negative for chest pain and palpitations  Gastrointestinal: Negative for constipation, diarrhea, nausea and vomiting  Reports an abdominal wall pain with activities like vacumming-had imaging for renal stones in June and notes pain predates the symptoms  Psychiatric/Behavioral: Suicidal ideas: no complait of anxiety or depression  Objective:      /70 (BP Location: Left arm, Patient Position: Sitting)   Pulse (!) 45   Temp 98 °F (36 7 °C)   Ht 5' 3" (1 6 m)   Wt 61 kg (134 lb 8 oz)   LMP  (LMP Unknown)   BMI 23 83 kg/m²          Physical Exam  Constitutional:       General: She is not in acute distress  Appearance: Normal appearance  She is not ill-appearing, toxic-appearing or diaphoretic  Pulmonary:      Effort: Pulmonary effort is normal    Abdominal:      Comments: Large well healed mid-abdominal vertical scar and left lateral  sided horizontal scar   Musculoskeletal:      Comments: Normal gait   Neurological:      Mental Status: She is alert     Psychiatric:         Mood and Affect: Mood normal

## 2020-10-06 ENCOUNTER — TELEPHONE (OUTPATIENT)
Dept: BARIATRICS | Facility: CLINIC | Age: 45
End: 2020-10-06

## 2020-10-06 DIAGNOSIS — K21.9 GERD (GASTROESOPHAGEAL REFLUX DISEASE): Primary | ICD-10-CM

## 2020-10-06 RX ORDER — PANTOPRAZOLE SODIUM 40 MG/1
40 TABLET, DELAYED RELEASE ORAL 2 TIMES DAILY
Qty: 180 TABLET | Refills: 2 | Status: SHIPPED | OUTPATIENT
Start: 2020-10-06 | End: 2021-04-30

## 2020-11-30 ENCOUNTER — TELEPHONE (OUTPATIENT)
Dept: NEUROLOGY | Facility: CLINIC | Age: 45
End: 2020-11-30

## 2020-12-17 ENCOUNTER — TELEMEDICINE (OUTPATIENT)
Dept: NEUROLOGY | Facility: CLINIC | Age: 45
End: 2020-12-17
Payer: COMMERCIAL

## 2020-12-17 DIAGNOSIS — Z98.2 S/P VP SHUNT: ICD-10-CM

## 2020-12-17 DIAGNOSIS — G93.2 PSEUDOTUMOR CEREBRI: ICD-10-CM

## 2020-12-17 DIAGNOSIS — H93.A1 PULSATILE TINNITUS OF RIGHT EAR: ICD-10-CM

## 2020-12-17 DIAGNOSIS — H47.10 PAPILLEDEMA: Primary | ICD-10-CM

## 2020-12-17 PROCEDURE — 99214 OFFICE O/P EST MOD 30 MIN: CPT | Performed by: PHYSICIAN ASSISTANT

## 2020-12-17 PROCEDURE — 1036F TOBACCO NON-USER: CPT | Performed by: PHYSICIAN ASSISTANT

## 2020-12-18 ENCOUNTER — TELEPHONE (OUTPATIENT)
Dept: NEUROLOGY | Facility: CLINIC | Age: 45
End: 2020-12-18

## 2020-12-23 ENCOUNTER — HOSPITAL ENCOUNTER (OUTPATIENT)
Dept: CT IMAGING | Facility: HOSPITAL | Age: 45
Discharge: HOME | End: 2020-12-23
Payer: COMMERCIAL

## 2020-12-23 ENCOUNTER — HOSPITAL ENCOUNTER (OUTPATIENT)
Dept: RADIOLOGY | Facility: HOSPITAL | Age: 45
Discharge: HOME | End: 2020-12-23
Payer: COMMERCIAL

## 2020-12-23 DIAGNOSIS — G93.2 PSEUDOTUMOR CEREBRI: ICD-10-CM

## 2020-12-23 DIAGNOSIS — Z98.2 S/P VP SHUNT: ICD-10-CM

## 2020-12-23 DIAGNOSIS — H47.10 PAPILLEDEMA: ICD-10-CM

## 2020-12-23 PROCEDURE — 70450 CT HEAD/BRAIN W/O DYE: CPT

## 2020-12-23 PROCEDURE — 70250 X-RAY EXAM OF SKULL: CPT

## 2020-12-23 PROCEDURE — G1004 CDSM NDSC: HCPCS

## 2020-12-23 PROCEDURE — 74018 RADEX ABDOMEN 1 VIEW: CPT

## 2020-12-23 PROCEDURE — 71046 X-RAY EXAM CHEST 2 VIEWS: CPT

## 2021-01-08 ENCOUNTER — TELEPHONE (OUTPATIENT)
Dept: NEUROSURGERY | Facility: CLINIC | Age: 46
End: 2021-01-08

## 2021-01-08 NOTE — TELEPHONE ENCOUNTER
Patient called nurse line stating that she recently saw Opthalmology and they recommended that she schedule an appointment with DKO regarding symptoms that she has been experiencing  Per Optho note which is attached in chart, "advised patient that swooshing on the ear can be a sign of elevated pressure, however, the pressure is not elevated at today's visit  Mariama Huggins advised to call DKO to let him know symptoms to make sure it's not r/t the shunt "    Would you like to see patient in the office and if so, would you like to see her before or after her appointment with ENT on 1/28/21?

## 2021-01-11 NOTE — TELEPHONE ENCOUNTER
Called patient and informed her of DKO's response  She verbalized an understanding and has an appt scheduled with ENT already  Patient appreciative for the call back

## 2021-01-28 ENCOUNTER — OFFICE VISIT (OUTPATIENT)
Dept: OTOLARYNGOLOGY | Facility: CLINIC | Age: 46
End: 2021-01-28
Payer: COMMERCIAL

## 2021-01-28 ENCOUNTER — OFFICE VISIT (OUTPATIENT)
Dept: AUDIOLOGY | Facility: CLINIC | Age: 46
End: 2021-01-28
Payer: COMMERCIAL

## 2021-01-28 VITALS
WEIGHT: 137.6 LBS | TEMPERATURE: 98 F | SYSTOLIC BLOOD PRESSURE: 148 MMHG | BODY MASS INDEX: 24.38 KG/M2 | RESPIRATION RATE: 16 BRPM | HEART RATE: 67 BPM | DIASTOLIC BLOOD PRESSURE: 85 MMHG | HEIGHT: 63 IN

## 2021-01-28 DIAGNOSIS — G93.2 PSEUDOTUMOR CEREBRI: ICD-10-CM

## 2021-01-28 DIAGNOSIS — Z98.2 S/P VP SHUNT: ICD-10-CM

## 2021-01-28 DIAGNOSIS — H90.3 SENSORINEURAL HEARING LOSS (SNHL), BILATERAL: ICD-10-CM

## 2021-01-28 DIAGNOSIS — H93.A1 PULSATILE TINNITUS, RIGHT EAR: Primary | ICD-10-CM

## 2021-01-28 DIAGNOSIS — H69.81 ETD (EUSTACHIAN TUBE DYSFUNCTION), RIGHT: ICD-10-CM

## 2021-01-28 DIAGNOSIS — H93.13 TINNITUS OF BOTH EARS: Primary | ICD-10-CM

## 2021-01-28 PROCEDURE — 92557 COMPREHENSIVE HEARING TEST: CPT | Performed by: AUDIOLOGIST

## 2021-01-28 PROCEDURE — 3008F BODY MASS INDEX DOCD: CPT | Performed by: OTOLARYNGOLOGY

## 2021-01-28 PROCEDURE — 99203 OFFICE O/P NEW LOW 30 MIN: CPT | Performed by: OTOLARYNGOLOGY

## 2021-01-28 PROCEDURE — 1036F TOBACCO NON-USER: CPT | Performed by: OTOLARYNGOLOGY

## 2021-01-28 PROCEDURE — 92567 TYMPANOMETRY: CPT | Performed by: AUDIOLOGIST

## 2021-01-28 NOTE — PROGRESS NOTES
Otolaryngology Head and Neck Surgery History and Physical    Chief complaint    Chief Complaint   Patient presents with    Tinnitus     Right ear        History of the Present Illness    Kofi Figueredo is a 54 y o  who presents tinnitus right ear  She states she has been noticing intermittent buzzing/whooshing sound in right ear since around November  Has a history of pseudotumor cerebri and is following with neurosurgery  Has shunt in place and is functioning well  She notices intermittent muffled sensation as well  Has had eustachian tube issues in the past - no tubes or infections  Review of Systems   Constitutional: Negative  HENT: Positive for tinnitus  Eyes: Negative  Respiratory: Negative  Cardiovascular: Negative  Gastrointestinal: Negative  Endocrine: Negative  Genitourinary: Negative  Musculoskeletal: Negative  Skin: Negative  Allergic/Immunologic: Negative  Neurological: Positive for dizziness  Hematological: Negative  Psychiatric/Behavioral: Negative  Past Medical History:   Diagnosis Date    Abdominal pain     Brain condition     Pseudotumor Cerebri     Chronic kidney disease     De Quervain's disease (tenosynovitis)     Esophageal perforation     Gastric leak     GERD (gastroesophageal reflux disease)     Headache     Idiopathic intracranial hypertension     Kidney stone     Migraine     No blood products     per pt: personal and Moravian beliefs   surgeon office aware 12/13/19    Papilledema, both eyes     PONV (postoperative nausea and vomiting)     Postgastrectomy malabsorption     Presence of lumboperitoneal shunt     Resolved: Sep 20, 2017    Rotator cuff tendinitis     Resolved: Aug 23, 2017    Visual field defect        Past Surgical History:   Procedure Laterality Date    CSF SHUNT      LP shunt - 2015 -  shunt - 2017    ESOPHAGOGASTRODUODENOSCOPY N/A 2/23/2018    Procedure: ESOPHAGOGASTRODUODENOSCOPY (EGD) WITH ESOPHAGEAL STENT PLACEMENT;  Surgeon: Sapna Flores MD;  [facility];  Service: Thoracic    ESOPHAGOGASTRODUODENOSCOPY N/A 2/23/2018    Procedure: ESOPHAGOGASTRODUODENOSCOPY (EGD) WITH REMOVAL ESOPHAGEAL STENT  AND REPLACEMENT WITH 23mm X155mm [address];  Surgeon: Sapna Flores MD;  [facility];  Service: Thoracic    ESOPHAGOGASTRODUODENOSCOPY N/A 3/28/2018    Procedure: ESOPHAGOGASTRODUODENOSCOPY (EGD) with PEJ placement ;  Surgeon: Mayra Fernandes MD;  [facility]; Service: Gastroenterology    ESOPHAGOGASTRODUODENOSCOPY N/A 4/5/2018    Procedure: ESOPHAGOGASTRODUODENOSCOPY (EGD) with botox injection and kaofed placement;  Surgeon: David Gorman DO;  [facility]; Service: Gastroenterology    ESOPHAGOGASTRODUODENOSCOPY N/A 4/10/2018    Procedure: ESOPHAGOGASTRODUODENOSCOPY (EGD) with Kaofed placement;  Surgeon: Alexia Souza MD;  [facility]; Service: Gastroenterology    ESOPHAGOSCOPY WITH STENT INSERTION N/A 1/24/2018    Procedure: INSERTION STENT ESOPHAGEAL;  Surgeon: Shakir Yeboah MD;  [facility]; Service: Gastroenterology    EYE SURGERY Bilateral 1980    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)     FL RETROGRADE PYELOGRAM  6/24/2020    GASTRIC BYPASS  12/19/2016    Lap sleeve gastrectomy w/shunt length shortening procedure    HIATAL HERNIA REPAIR      HYSTERECTOMY  03/14/2013    IR LUMBAR PUNCTURE  8/29/2018    LAPAROTOMY N/A 1/25/2018    Procedure: Exploratory Laparotomy, wash out,placement of drains, placement of NG feeding tube ;   Surgeon: Casey Fernandez DO;  [facility];  Service: General    LUMBAR PERITONEAL SHUNT  11/19/2015    Laparoscopic assisted    OR CREATE SHUNT:VENTRIC-ATRIAL Right 12/18/2019    Procedure: IMAGE GUIDED INSERTION OF RIGHT CORONAL VENTRICULAR-ATRIAL SHUNT;  Surgeon: Erick Meyers MD;  [facility];  Service: Neurosurgery   [address] [place] [address] Right 5/31/2017    Procedure: IMAGE GUIDED CORONAL PLACEMENT OF PROGRAMABLE VENTRICULAR-PERITONEAL SHUNT, REMOVAL OF LP SHUNT ;  Surgeon: Regulo Douglas MD;  [facility];  Service: Neurosurgery    ND CYSTO/URETERO W/LITHOTRIPSY &INDWELL STENT INSRT Bilateral 6/24/2020    Procedure: CYSTOSCOPY URETEROSCOPY WITH LITHOTRIPSY HOLMIUM LASER, RETROGRADE PYELOGRAM AND exchange bilateral  STENTs URETERAL;  Surgeon: Andrea Zhao MD;  [facility];  Service: Urology    ND CYSTOURETHROSCOPY,URETER CATHETER N/A 6/6/2020    Procedure: Bilateral CYSTOSCOPY RETROGRADE PYELOGRAM WITH INSERTION STENT URETERAL;  Surgeon: Stephane Lawrence MD;  Location: [facility] MAIN OR;  Service: Urology    ND EGD TRANSORAL BIOPSY SINGLE/MULTIPLE N/A 6/27/2018    Procedure: ESOPHAGOGASTRODUODENOSCOPY (EGD) with padlock clip placement;  Surgeon: Akila Elizabeth MD;  [facility];   Service: [place] CSF SHUNT,W/O REPLACE Right 2/5/2018    Procedure: Removal of  shunt;  Surgeon: Regulo Douglas MD;  [facility];  Service: Neurosurgery    ND REPLACEMENT/REVISION,CSF SHUNT Right 1/25/2018    Procedure: Externalization of right-sided SHUNT VENTRICULAR-PERITONEAL in anterior chest wall ribs two and three level  ;  Surgeon: Georgi Holstein, MD;  [facility];  Service: Neurosurgery    WRIST SURGERY Right     x3 2006, 2008       Social History     Socioeconomic History    Marital status: Single     Spouse name: Not on file    Number of children: 2    Years of education: High School or GED     Highest education level: Not on file   Occupational History    Occupation: employed    Social Needs    Financial resource strain: Not on file    Food insecurity     Worry: Not on file     Inability: Not on file   [employer] needs     Medical: Not on file     Non-medical: Not on file   Tobacco Use    Smoking status: Former Smoker     Packs/day: 0 50     Years: 20 00     Pack years: 10 00 Quit date: 2012     Years since quittin 4    Smokeless tobacco: Never Used   Substance and Sexual Activity    Alcohol use: Never     Frequency: Never     Binge frequency: Never    Drug use: Yes     Frequency: 7 0 times per week     Types: Marijuana     Comment: medical marijuana, vaping & topical     Sexual activity: Yes   Lifestyle    Physical activity     Days per week: Not on file     Minutes per session: Not on file    Stress: Not on file   Relationships    Social connections     Talks on phone: Not on file     Gets together: Not on file     Attends Christianity service: Not on file     Active member of club or organization: Not on file     Attends meetings of clubs or organizations: Not on file     Relationship status: Not on file    Intimate partner violence     Fear of current or ex partner: Not on file     Emotionally abused: Not on file     Physically abused: Not on file     Forced sexual activity: Not on file   Other Topics Concern    Not on file   Social History Narrative    ** Merged History Encounter **            Family History   Problem Relation Age of Onset    No Known Problems Mother     No Known Problems Father     Thyroid cancer Brother     Colon cancer Maternal Grandfather     Cancer Paternal Grandmother     Cancer Paternal Grandfather     Cancer Family         Gastric    Leukemia Family     Colon cancer Family     Pancreatic cancer Family            /85 (BP Location: Right arm, Patient Position: Sitting, Cuff Size: Standard)   Pulse 67   Temp 98 °F (36 7 °C) (Temporal)   Resp 16   Ht 5' 3" (1 6 m)   Wt 62 4 kg (137 lb 9 6 oz)   LMP  (LMP Unknown)   BMI 24 37 kg/m²       Current Outpatient Medications:     calcium citrate-vitamin D (CITRACAL+D) 315-200 MG-UNIT per tablet, Take 1 tablet by mouth 2 (two) times a day, Disp: , Rfl:     Multiple Vitamin (MULTIVITAMIN) tablet, Take 1 tablet by mouth daily Wears a patch, Disp: , Rfl:     NON FORMULARY, , Disp: , Rfl:     dexlansoprazole (Dexilant) 60 MG capsule, Take 1 capsule (60 mg total) by mouth daily (Patient not taking: Reported on 12/16/2020), Disp: 30 capsule, Rfl: 2    pantoprazole (PROTONIX) 40 mg tablet, Take 1 tablet (40 mg total) by mouth 2 (two) times a day (Patient not taking: Reported on 12/16/2020), Disp: 180 tablet, Rfl: 2     Physical Exam  Vitals signs reviewed  Constitutional:       General: She is not in acute distress  Appearance: She is well-developed  HENT:      Head: Normocephalic  Right Ear: Hearing, tympanic membrane, ear canal and external ear normal  No decreased hearing noted  No drainage  No middle ear effusion  Tympanic membrane is not erythematous  Left Ear: Hearing, tympanic membrane, ear canal and external ear normal  No decreased hearing noted  No drainage  No middle ear effusion  Tympanic membrane is not erythematous  Nose: Nose normal       Mouth/Throat:      Mouth: Mucous membranes are not dry  Dentition: Normal dentition  Pharynx: Uvula midline  No oropharyngeal exudate  Tonsils: No tonsillar abscesses  Eyes:      General: No scleral icterus  Right eye: No discharge  Left eye: No discharge  Conjunctiva/sclera: Conjunctivae normal       Pupils: Pupils are equal, round, and reactive to light  Neck:      Musculoskeletal: Neck supple  Thyroid: No thyroid mass or thyromegaly  Trachea: No tracheal deviation  Cardiovascular:      Rate and Rhythm: Normal rate and regular rhythm  Pulmonary:      Effort: Pulmonary effort is normal  No respiratory distress  Breath sounds: No stridor  Lymphadenopathy:      Cervical: No cervical adenopathy  Skin:     General: Skin is warm and dry  Neurological:      Mental Status: She is alert and oriented to person, place, and time  Cranial Nerves: No cranial nerve deficit     Psychiatric:         Behavior: Behavior normal            Procedure:      Imaging studies:      Pertinent laboratory data:      Assessment and plan:    1  Pulsatile tinnitus, right ear  CTA head and neck w wo contrast   2  Pseudotumor cerebri     3  S/P  shunt     4  Sensorineural hearing loss (SNHL), bilateral     5  ETD (Eustachian tube dysfunction), right         Bilateral ears well appearing  Audiogram today demonstrates bilateral normal hearing with the exception of a very mild loss in both ears at 250  Bilateral type A tymps with excellent WRS  Interestingly, audiogram shows a very mild conductive gap in low frequencies and a very mild negative pressure with bilateral tymps  We discussed differential for pulsatile tinnitus  In her case, symptoms may be secondary to ETD but difficult to say definitively with today's audiogram  We discussed options for further evaluation/managmanet  Increased intracranial pressure can also cause pulsatile tinnitus but by her report the shunt has been functioning well  She will set up a follow up with neurosurgery for reassurance  Discussed additional factors causing pulsatile tinnitus and potential workup  I have ordered a CTA head/neck for her to obtain if symptoms persist  She will first speak with her neurosurgeon for additional input  Follow up after CTA for re evaluation

## 2021-02-02 NOTE — PLAN OF CARE
Problem: PHYSICAL THERAPY ADULT  Goal: Performs mobility at highest level of function for planned discharge setting  See evaluation for individualized goals  Treatment/Interventions: Functional transfer training, LE strengthening/ROM, Therapeutic exercise, Endurance training, Bed mobility, Spoke to nursing  Equipment Recommended:  (R/O LEAST RESTRICTIVE AD )       See flowsheet documentation for full assessment, interventions and recommendations  Outcome: Progressing  Prognosis: Fair  Problem List: Decreased strength, Decreased range of motion, Decreased endurance, Impaired balance, Decreased mobility, Pain, Decreased safety awareness, Impaired judgement  Assessment: Patient seen for PT session  Patient agreeable to PT session  patient reported pain in the abdomen and chest area 9/10  patient performed supine LE TE's actively and SLR only with AAROM  Supine to sit with Min A x 1  log roll technique instructed to aovid too much discomfort in the abdominal area  Performed STS and back transfer with Min A x 1 and another SBA with the first attempt and no SBA for second attempt  Patient able to ambulate increased distance in today's session  needed seated rest between ambulation due to fatigue  Patient seated on recliner wih LEs elevated and all needs within reach  Patient continues to make progress in functional mobility however still requires assist in all levels of mobility  Will benefit from continued skilled PT to improve strength and endurance  Reviewed HEp and instructed patient to perfom HEP atleast 3x/day  Barriers to Discharge: Inaccessible home environment, Decreased caregiver support     Recommendation: Post acute IP rehab     PT - OK to Discharge:  (to rehab)    See flowsheet documentation for full assessment  Refill approved as requested.

## 2021-03-24 ENCOUNTER — OFFICE VISIT (OUTPATIENT)
Dept: BARIATRICS | Facility: CLINIC | Age: 46
End: 2021-03-24
Payer: COMMERCIAL

## 2021-03-24 VITALS
TEMPERATURE: 98.6 F | WEIGHT: 137.5 LBS | SYSTOLIC BLOOD PRESSURE: 119 MMHG | BODY MASS INDEX: 24.36 KG/M2 | HEIGHT: 63 IN | HEART RATE: 58 BPM | DIASTOLIC BLOOD PRESSURE: 80 MMHG

## 2021-03-24 DIAGNOSIS — K21.9 GERD (GASTROESOPHAGEAL REFLUX DISEASE): Primary | ICD-10-CM

## 2021-03-24 PROCEDURE — 1036F TOBACCO NON-USER: CPT | Performed by: SURGERY

## 2021-03-24 PROCEDURE — 3008F BODY MASS INDEX DOCD: CPT | Performed by: SURGERY

## 2021-03-24 PROCEDURE — 99214 OFFICE O/P EST MOD 30 MIN: CPT | Performed by: SURGERY

## 2021-03-24 NOTE — PROGRESS NOTES
BARIATRIC HISTORY AND PHYSICAL - BARIATRIC SURGERY  David Hand 54 y o  female MRN: [id number]  Unit/Bed#:  Encounter: [id number]      HPI:  David Hand is a 54 y o  female who presents  With heartburn  She is status post  Lap sleeve gastrectomy in 2016 by Dr Kate Jefferson  Laparoscopic  paraesophageal hernia repair in 2018 by Dr Davila complicated by leak GEJ, required open abdomen and groin gained hospitalization  patient report acid reflux on daily basis, patient had EGD with strata level 1,2,3 February 2020 with at least 50% improvement of her symptoms  she is currently on Dexilant 40 mg twice daily  Patient denies for years, difficulty of swallowing or nausea  She has been watching her diet, she reports tape water also cause acid reflux  she is here for annual follow-up for her gastroesophageal reflux disease  Review of Systems   Constitutional: Negative for chills and fatigue  HENT: Negative for ear pain  Eyes: Negative for pain  Respiratory: Negative for cough, shortness of breath and wheezing  Cardiovascular: Negative for chest pain  Gastrointestinal: Negative for abdominal distention and abdominal pain  Acid reflux   Endocrine: Negative for polydipsia  Genitourinary: Negative for flank pain  Musculoskeletal: Negative for arthralgias and back pain  Skin: Negative for pallor  Allergic/Immunologic: Negative for food allergies  Neurological: Negative for weakness and headaches  Hematological: Does not bruise/bleed easily  Psychiatric/Behavioral: Negative for confusion         Historical Information   Past Medical History:   Diagnosis Date    Abdominal pain     Brain condition     Pseudotumor Cerebri     Chronic kidney disease     De Quervain's disease (tenosynovitis)     Esophageal perforation     Gastric leak     GERD (gastroesophageal reflux disease)     Headache     Idiopathic intracranial hypertension     Kidney stone     Migraine     No blood products     per pt: personal and Religion beliefs  surgeon office aware 12/13/19    Papilledema, both eyes     PONV (postoperative nausea and vomiting)     Postgastrectomy malabsorption     Presence of lumboperitoneal shunt     Resolved: Sep 20, 2017    Rotator cuff tendinitis     Resolved: Aug 23, 2017    Visual field defect      Past Surgical History:   Procedure Laterality Date    CSF SHUNT      LP shunt - 2015 -  shunt - 2017    ESOPHAGOGASTRODUODENOSCOPY N/A 2/23/2018    Procedure: ESOPHAGOGASTRODUODENOSCOPY (EGD) WITH ESOPHAGEAL STENT PLACEMENT;  Surgeon: Tee Perez MD;  [facility];  Service: Thoracic    ESOPHAGOGASTRODUODENOSCOPY N/A 2/23/2018    Procedure: ESOPHAGOGASTRODUODENOSCOPY (EGD) WITH REMOVAL ESOPHAGEAL STENT  AND REPLACEMENT WITH 23mm X155mm [address] [place] [address];  Surgeon: Tee Perez MD;  [facility];  Service: Thoracic    ESOPHAGOGASTRODUODENOSCOPY N/A 3/28/2018    Procedure: ESOPHAGOGASTRODUODENOSCOPY (EGD) with PEJ placement ;  Surgeon: Susana Alvarado MD;  [facility]; Service: Gastroenterology    ESOPHAGOGASTRODUODENOSCOPY N/A 4/5/2018    Procedure: ESOPHAGOGASTRODUODENOSCOPY (EGD) with botox injection and kaofed placement;  Surgeon: Arlene Ruiz DO;  [facility]; Service: Gastroenterology    ESOPHAGOGASTRODUODENOSCOPY N/A 4/10/2018    Procedure: ESOPHAGOGASTRODUODENOSCOPY (EGD) with Kaofed placement;  Surgeon: Francine Aviles MD;  [facility]; Service: Gastroenterology    ESOPHAGOSCOPY WITH STENT INSERTION N/A 1/24/2018    Procedure: INSERTION STENT ESOPHAGEAL;  Surgeon: Arlene Mack MD;  [facility];   Service: Gastroenterology    EYE SURGERY Bilateral 1980    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)     FL RETROGRADE PYELOGRAM  6/24/2020    GASTRIC BYPASS  12/19/2016    Lap sleeve gastrectomy w/shunt length shortening procedure    HIATAL HERNIA REPAIR      HYSTERECTOMY  03/14/2013  IR LUMBAR PUNCTURE  8/29/2018    LAPAROTOMY N/A 1/25/2018    Procedure: Exploratory Laparotomy, wash out,placement of drains, placement of NG feeding tube ; Surgeon: Worley Severs, DO;  [facility];  Service: General    LUMBAR PERITONEAL SHUNT  11/19/2015    Laparoscopic assisted    MO CREATE SHUNT:VENTRIC-ATRIAL Right 12/18/2019    Procedure: IMAGE GUIDED INSERTION OF RIGHT CORONAL VENTRICULAR-ATRIAL SHUNT;  Surgeon: Carly Bonilla MD;  [facility];  Service: Neurosurgery    MO CREATE SHUNT:VENTRIC-PERITONEAL Right 5/31/2017    Procedure: IMAGE GUIDED CORONAL PLACEMENT OF PROGRAMABLE VENTRICULAR-PERITONEAL SHUNT, REMOVAL OF LP SHUNT ;  Surgeon: Carly Bonilla MD;  [facility];  Service: Neurosurgery    MO CYSTO/URETERO W/LITHOTRIPSY &INDWELL STENT INSRT Bilateral 6/24/2020    Procedure: CYSTOSCOPY URETEROSCOPY WITH LITHOTRIPSY HOLMIUM LASER, RETROGRADE PYELOGRAM AND exchange bilateral  STENTs URETERAL;  Surgeon: Chela Holt MD;  [facility];  Service: Urology    MO CYSTOURETHROSCOPY,URETER CATHETER N/A 6/6/2020    Procedure: Bilateral CYSTOSCOPY RETROGRADE PYELOGRAM WITH INSERTION STENT URETERAL;  Surgeon: Kishan Sandoval MD;  Location: [facility] MA[facility] OR;  Service: Urology    MO EGD TRANSORAL BIOPSY SINGLE/MULTIPLE N/A 6/27/2018    Procedure: ESOPHAGOGASTRODUODENOSCOPY (EGD) with padlock clip placement;  Surgeon: Janey Charles MD;  [facility];   Service: [place]  CSF SHUNT,W/O REPLACE Right 2/5/2018    Procedure: Removal of  shunt;  Surgeon: Carly Bonilla MD;  [facility];  Service: Neurosurgery    MO REPLACEMENT/REVISION,CSF SHUNT Right 1/25/2018    Procedure: Externalization of right-sided SHUNT VENTRICULAR-PERITONEAL in anterior chest wall ribs two and three level  ;  Surgeon: Matt Cevallos MD;  [facility];  Service: Neurosurgery    WRIST SURGERY Right     x3 2006, 2008     Social History   Social History     Substance and Sexual Activity   Alcohol Use Never    Frequency: Never    Binge frequency: Never     Social History     Substance and Sexual Activity   Drug Use Yes    Frequency: 7 0 times per week    Types: Marijuana    Comment: medical marijuana, vaping & topical      Social History     Tobacco Use   Smoking Status Former Smoker    Packs/day: 0 50    Years: 20 00    Pack years: 10 00    Quit date: 2012    Years since quittin 5   Smokeless Tobacco Never Used     Family History: non-contributory    Meds/Allergies   all medications and allergies reviewed  Allergies   Allergen Reactions    Benadryl [Diphenhydramine] Anaphylaxis     Throat closing    Phenergan [Promethazine] Anaphylaxis       Objective     Current Vitals:   Blood Pressure: 119/80 (21 104)  Pulse: 58 (21 104)  Temperature: 98 6 °F (37 °C) (21)  Temp Source: Tympanic (21)  Height: 5' 3" (160 cm) (21)  Weight - Scale: 62 4 kg (137 lb 8 oz) (21)  bowel movement  [unfilled]    Invasive Devices     None                 Physical Exam  Constitutional:       Appearance: Normal appearance  HENT:      Head: Normocephalic  Mouth/Throat:      Mouth: Mucous membranes are moist    Eyes:      Conjunctiva/sclera: Conjunctivae normal       Pupils: Pupils are equal, round, and reactive to light  Neck:      Musculoskeletal: Normal range of motion  Cardiovascular:      Rate and Rhythm: Normal rate and regular rhythm  Pulmonary:      Effort: Pulmonary effort is normal    Abdominal:      General: Abdomen is flat  There is no distension  Tenderness: There is no abdominal tenderness  Comments: Midline scar with no hernia  Musculoskeletal: Normal range of motion  Skin:     General: Skin is warm  Capillary Refill: Capillary refill takes less than 2 seconds  Neurological:      General: No focal deficit present  Mental Status: She is alert     Psychiatric: Mood and Affect: Mood normal          Lab Results: I have personally reviewed pertinent lab results  Imaging: I have personally reviewed pertinent reports  EKG, Pathology, and Other Studies: I have personally reviewed pertinent reports  EGD with Bravo placement on 10/30/2019  EGD demonstrated:     · Signs of previous sleeve gastrectomy  Paraesophageal hernia repair complicated by a leak at the GE junction that was controlled endoscopically with a padlock device  · Regular Z-line 34 cm from the incisors  Successful deployment of Bravo at 28 cm  · Erythematous mucosa and linear furrows in the lower third of the esophagus  LA grade B     Bravo ambulatory pH monitoring demonstrated:     The patient had a significantly elevated DeMeester score at 69 5  with significant SAP and SI for heartburn and chest pain    In addition, the patient's percent time spent in reflux was 19 5% which was also significant      UGI:      Stable postoperative appearance of the stomach status post sleeve gastrectomy   No stricture, ulceration or mass identified      Grade 3 gastroesophageal reflux was demonstrated towards the completion of this examination      Code Status: [unfilled]  Advance Directive and Living Will:      Power of :    POLST:      Assessment/Plan:  Jaquelin Uriarte is a 54 y o  female who presents  With heartburn  She is status post  Lap sleeve gastrectomy in 2016 by Dr Shailesh An  Laparoscopic  paraesophageal hernia repair in 2018 by Dr Davila complicated by leak GEJ, required open abdomen and groin gained hospitalization  patient report acid reflux on daily basis, patient had EGD with strata level 1,2,3 February 2020 with at least 50% improvement of her symptoms  she is currently on Dexilant 40 mg twice daily  Patient denies for years, difficulty of swallowing or nausea  She has been watching her diet, she reports tape water also cause acid reflux     she is here for annual follow-up for her gastroesophageal reflux disease  we discussed with the patient,  Since she has 50% improvement of her symptoms with strata, there is no worsening acid reflux  We will continue with antiacid medication, keeping in mind she may require another strata procedure in case of worsening symptoms, increase dosage of antiacid medication however there is a 50% chance of no improvement  given the fact that the patient has complicated paraesophageal hernia with leak, she has her risk for conversion to bypass or placement of LINX  patient has an anxiety from blood withdrawal, will hold on and while blood withdrawal, patient to bring her vitamins with her next visit and take her vitamins on daily basis       follow-up in one year      Yohana Gilmore MD  Bariatric Surgery Fellow  3/24/2021  11:28 AM

## 2021-03-30 ENCOUNTER — TELEPHONE (OUTPATIENT)
Dept: NEUROSURGERY | Facility: CLINIC | Age: 46
End: 2021-03-30

## 2021-03-30 NOTE — TELEPHONE ENCOUNTER
Received a call from patient's chiropractor stating that patient expressed that he must call Dr Susana Kurtz prior to patient's chiropractor appt on Thursday  Returned call to Dr Patricia Watson who explained the patient wanted him to speak to Dr Susana Kurtz d/t her having a shunt  Dr Patricia Watson states he understands she has a shunt and has had patients in the past with shunts and knows how to manage these patients  Appreciative of call back

## 2021-04-30 ENCOUNTER — OFFICE VISIT (OUTPATIENT)
Dept: NEUROLOGY | Facility: CLINIC | Age: 46
End: 2021-04-30
Payer: COMMERCIAL

## 2021-04-30 VITALS
WEIGHT: 136.1 LBS | TEMPERATURE: 98.2 F | SYSTOLIC BLOOD PRESSURE: 145 MMHG | DIASTOLIC BLOOD PRESSURE: 71 MMHG | BODY MASS INDEX: 24.11 KG/M2 | HEART RATE: 53 BPM | HEIGHT: 63 IN

## 2021-04-30 DIAGNOSIS — Z98.2 S/P VP SHUNT: ICD-10-CM

## 2021-04-30 DIAGNOSIS — N20.0 RENAL CALCULI: ICD-10-CM

## 2021-04-30 DIAGNOSIS — G93.2 PSEUDOTUMOR CEREBRI: ICD-10-CM

## 2021-04-30 DIAGNOSIS — G43.109 MIGRAINE WITH AURA AND WITHOUT STATUS MIGRAINOSUS, NOT INTRACTABLE: Primary | ICD-10-CM

## 2021-04-30 DIAGNOSIS — R12 HEART BURN: ICD-10-CM

## 2021-04-30 PROCEDURE — 3008F BODY MASS INDEX DOCD: CPT | Performed by: SURGERY

## 2021-04-30 PROCEDURE — 99215 OFFICE O/P EST HI 40 MIN: CPT | Performed by: PHYSICIAN ASSISTANT

## 2021-04-30 NOTE — PROGRESS NOTES
[facility]  PATIENT:  Zora Ayala  MRN:  [id number]  :  1975  DATE OF SERVICE:  2021      Assessment/Plan:     Migraine with aura and without status migrainosus, not intractable  Continue all your vitamins  Emgality 2 injection first month then 1 injection every 30 days    Abortive:   -nothing for now  Renal calculi  Unable to use topamax         Problem List Items Addressed This Visit        Cardiovascular and Mediastinum    Migraine with aura and without status migrainosus, not intractable - Primary     Continue all your vitamins  Emgality 2 injection first month then 1 injection every 30 days    Abortive:   -nothing for now  Relevant Medications    Galcanezumab-gnlm 120 MG/ML SOAJ    Galcanezumab-gnlm 120 MG/ML SOAJ       Nervous and Auditory    Pseudotumor cerebri       Genitourinary    Renal calculi     Unable to use topamax            Other    S/P  shunt    Heart burn    Relevant Medications    dexlansoprazole (Dexilant) 60 MG capsule              History of Present Illness: We had the pleasure of evaluating Shweta Sandoval in neurological follow up  today for headaches  As you know,  she is a 54 y o   right handed female  Past medical history:  Rody Marquez to [facility] for hiatal hernia and was in ICU for a long time   Was septic after her surgeries and had her  shunt removed 2018 due to concerns of bacterial infection spreading through the shunt     Patient complains of pain in the right side of head like had been hit  Also has "zingers" 1-2 a week and lasts a minute to 30 minutes  Sometimes has to lay down for the rest of the day  and hears a vibration noise for 2-3 week  Sometimes is very loud and annoying  This pain began in mid November  Pain is the same as before  Pain is similar to prior to shunt replacement    Noise is only new symptoms       Idiopathic intracranial hypertension:  She is s/p lumboperitoneal shunt placement for increased intracranial pressure, performed at [facility] in 2015 and removal of the shunt and placement of a  shunt in May of 2017  She is also status post gastric bypass surgery and has lost 60lbs       She saw her ophthalmologist 3/2020 and there was no optic nerve swelling      Patient reports she has daily pressure and pain  Has good days and bad days but is always has pressure         What medications do you take or have you taken for your headaches?    PREVENTIVE:  -  Nortriptyline (this helped her headaches),  Protriptyline  - Gabapentin (off balance), Depakote, lamictal  -  Diamox (stopped by provider because of her history of kidney stones),   - Verapamil (fatigue),  ABORTIVE: Fioricet (stopped on her own), Tramadol (stopped on her own), Toradol (stopped on her own), Indomethacin     Non-Medical/Alternative Treatments used in the past for headaches: Has tried Massage (doesnt help headaches), Chiropractic adjustment (doesnt help with headaches), Acupuncture (doesnt help with headaches)     What is your current pain level - 4/10,     How often do the headaches occur -   Baseline pain is : daily is 2/10  Increase pain: does have variable days of increasing pain   Pain can increase for 30 minutes or last all day  Can have 10/10 pain once a week  She is also getting the sensation of light headed and pain in the frontal region with bending over or getting up from sitting potision      What time of the day do the headaches start -   Baseline pain: there all the time  Increase pain: anytime of the day     How long do the headaches last -   Baseline pain is : there all the time  Increase pain: few minutes to 30 min max - this happens daily  She is also getting the sensation of light headed and pain in the frontal region with bending over or getting up from sitting poistion-this is also variable, usually happens 1-2 times a week   Lasts only a minute or 2     Are you ever headache free - no always has pressure in her head     Where are they located -   Baseline pain: entire head  Increase pain: one temple to the other or frontal to the back of her head  Position change: frontal only     What is the intensity of pain -   Baseline pain: 2-4/10  Intense pain: 10/10  Position change pain: 9/10     Any warning prior to headache and how long do they last - N/A, they're constant     Describe your usual headache -   Baseline pain: Pressure, "like my brain is going to explode out of my skull"  Increase pain: zap, zinger and it can be sharp (pt has difficulty describing the sensation)  Position change: sharp pressure     Associated symptoms:   -       Problem with concentration  -       Blurred vision occasionally  -       Dizziness when extending or flexing neck to look up or down  -       prefer to be alone and in a dark room, unable to work     Headache are worse if the patient: cough, sneeze, bending over, moving bowel, running or exercising,   Headache trigger: N/A, they're constant     Have you used CBD or THC for your headaches and how often? Yes, has medical card  Are you current pregnant or planning on getting pregnant? No  Have you ever had any Brain imaging? yes  I personally reviewed these images         MRAs of the head and neck without contrast done on 3/29/17 are both unremarkable      Brain MRI without contrast on 3/27/17 shows ventricles in the lower limits of normal in size consistent with pseudotumor cerebri, as well as scattered parenchymal WM changes which are nonspecific      Head CT 7/17: No acute intracranial abnormality      LP @ [facility] April/ 2017: OP 21, CP13    Reviewed old notes from physician seen in the past- see above HPI for summary of previous encounters         Past Medical History:   Diagnosis Date    Abdominal pain     Brain condition     Pseudotumor Cerebri     Chronic kidney disease     De Quervain's disease (tenosynovitis)     Esophageal perforation     Gastric leak     GERD (gastroesophageal reflux disease)     Headache     Idiopathic intracranial hypertension     Kidney stone     Migraine     No blood products     per pt: personal and Mormon beliefs   surgeon office aware 12/13/19    Papilledema, both eyes     PONV (postoperative nausea and vomiting)     Postgastrectomy malabsorption     Presence of lumboperitoneal shunt     Resolved: Sep 20, 2017    Rotator cuff tendinitis     Resolved: Aug 23, 2017    Visual field defect        Patient Active Problem List   Diagnosis    Pseudotumor cerebri    Acute peritonitis    S/P  shunt    Infection of  (ventriculoperitoneal) shunt, subsequent encounter    Murmur, cardiac    Refusal of blood product    Gastroesophageal reflux disease    Choroidal nevus, right eye    Moderate protein-calorie malnutrition (HCC)    Postoperative intra-abdominal abscess    Abdominal wound dehiscence    Neutrophilic leukocytosis    Left-sided chest wall pain    Bariatric surgery status    Refusal of influenza vaccine by provider    Renal calculi    Depression    Diarrhea    History of idiopathic intracranial hypertension    Positional headache    Chronic tension-type headache, intractable    Regurgitation of food    Postgastrectomy malabsorption    Papilledema    Acute cystitis with hematuria    Family history of malignant neoplasm of gastrointestinal tract    Hypercholesteremia    Subacromial bursitis of right shoulder joint    Mixed incontinence    Hydroureteronephrosis    Heart burn    Encounter for surgical aftercare following surgery of digestive system    Migraine with aura and without status migrainosus, not intractable       Medications:      Current Outpatient Medications   Medication Sig Dispense Refill    calcium citrate-vitamin D (CITRACAL+D) 315-200 MG-UNIT per tablet Take 1 tablet by mouth 2 (two) times a day      dexlansoprazole (Dexilant) 60 MG capsule Take 1 capsule (60 mg total) by mouth daily 30 capsule 2    Multiple Vitamin (MULTIVITAMIN) tablet Take 1 tablet by mouth daily Wears a patch      NON FORMULARY       Galcanezumab-gnlm 120 MG/ML SOAJ Inject 120 mg under the skin every 30 (thirty) days 1 mL 11    Galcanezumab-gnlm 120 MG/ML SOAJ Inject 240 mg under the skin once for 1 dose 2 mL 0     No current facility-administered medications for this visit  Allergies:       Allergies   Allergen Reactions    Benadryl [Diphenhydramine] Anaphylaxis     Throat closing    Phenergan [Promethazine] Anaphylaxis       Family History:     Family History   Problem Relation Age of Onset    No Known Problems Mother     No Known Problems Father     Thyroid cancer Brother     Colon cancer Maternal Grandfather     Cancer Paternal Grandmother     Cancer Paternal Grandfather     Cancer Family         Gastric    Leukemia Family     Colon cancer Family     Pancreatic cancer Family        Social History:     Social History     Socioeconomic History    Marital status: Single     Spouse name: Not on file    Number of children: 2    Years of education: High School or GED     Highest education level: Not on file   Occupational History    Occupation: employed    Social Needs    Financial resource strain: Not on file    Food insecurity     Worry: Not on file     Inability: Not on file   [employer] needs     Medical: Not on file     Non-medical: Not on file   Tobacco Use    Smoking status: Former Smoker     Packs/day: 0 50     Years: 20 00     Pack years: 10 00     Quit date: 2012     Years since quittin 6    Smokeless tobacco: Never Used   Substance and Sexual Activity    Alcohol use: Never     Frequency: Never     Binge frequency: Never    Drug use: Yes     Frequency: 7 0 times per week     Types: Marijuana     Comment: medical marijuana, vaping & topical     Sexual activity: Yes   Lifestyle    Physical activity     Days per week: Not on file     Minutes per session: Not on file    Stress: Not on file   Relationships    Social connections     Talks on phone: Not on file     Gets together: Not on file     Attends Yarsanism service: Not on file     Active member of club or organization: Not on file     Attends meetings of clubs or organizations: Not on file     Relationship status: Not on file    Intimate partner violence     Fear of current or ex partner: Not on file     Emotionally abused: Not on file     Physically abused: Not on file     Forced sexual activity: Not on file   Other Topics Concern    Not on file   Social History Narrative    ** Merged History Encounter **           I have reviewed the patient's medical, social and surgical history as well as medications in detail and updated the computerized patient record  Objective:   Physical Exam:                                                                   Vitals:            /71 (BP Location: Left arm, Patient Position: Sitting)   Pulse (!) 53   Temp 98 2 °F (36 8 °C) (Temporal)   Ht 5' 3" (1 6 m)   Wt 61 7 kg (136 lb 1 6 oz)   LMP  (LMP Unknown)   BMI 24 11 kg/m²   BP Readings from Last 3 Encounters:   04/30/21 145/71   03/24/21 119/80   01/28/21 148/85     Pulse Readings from Last 3 Encounters:   04/30/21 (!) 53   03/24/21 58   01/28/21 67          CONSTITUTIONAL: Well developed, well nourished, well groomed  No dysmorphic features  Eyes:  EOM normal      Neck:  Normal ROM, neck supple  HEENT:  Normocephalic atraumatic  Chest:  Respirations regular and unlabored  Psychiatric:  Normal behavior and appropriate affect      MENTAL STATUS  Orientation: Alert and oriented x 3  Fund of knowledge: Intact  MOTOR (Upper and lower extremities)   Bulk/tone/abnormal movement: Normal muscle bulk and tone  COORDINATION   Station/Gait: Normal baseline gait  Review of Systems:   Review of Systems  Constitutional: Negative  HENT: Negative  Eyes: Negative  Respiratory: Negative      Cardiovascular: Negative  Gastrointestinal: Negative  Endocrine: Negative  Genitourinary: Negative  Musculoskeletal: Negative  Skin: Negative  Allergic/Immunologic: Negative  Neurological: Negative  Hematological: Negative  Psychiatric/Behavioral: Negative  I personally reviewed the ROS entered by the MA    I spent 25 minutes in face-to-face discussion regarding  the pathophysiology of her current symptoms and further plan, as well as counseling, educating, and coordinating the patient's care including prognosis of diagnosis, diagnostic results, impression, and recommendations, risks and benefits of treatment, instructions for disease self management, treatment instructions and follow up requirements and spent 15 minutes non-face to face    Author:  Cynthia Torres PA-C 4/30/2021 3:54 PM

## 2021-04-30 NOTE — ASSESSMENT & PLAN NOTE
Continue all your vitamins  Emgality 2 injection first month then 1 injection every 30 days    Abortive:   -nothing for now

## 2021-04-30 NOTE — PROGRESS NOTES
Patient ID: Florencia Courser is a 54 y o  female  Assessment/Plan:    No problem-specific Assessment & Plan notes found for this encounter  {Assess/PlanSmartLinks:58527}       Subjective:    HPI    {[facility] HPI texts:54156}    {Common ambulatory SmartLinks:58490}         Objective:    Blood pressure 145/71, pulse (!) 53, temperature 98 2 °F (36 8 °C), temperature source Temporal, height 5' 3" (1 6 m), weight 61 7 kg (136 lb 1 6 oz), not currently breastfeeding      Physical Exam    Neurological Exam      ROS:    Review of Systems

## 2021-04-30 NOTE — PATIENT INSTRUCTIONS
Preventive:  Continue all your vitamins  Emgality 2 injection first month then 1 injection every 30 days    Abortive:   -nothing for now      Consider seeing a trauma therapist    [facility]  Geovanny Freitas [address] [place] [address], [address]  Phone  [phone]    [facility]  [address] [facility] [address] [place], [place], [address]  Phone  [phone]

## 2021-05-04 ENCOUNTER — TELEPHONE (OUTPATIENT)
Dept: NEUROLOGY | Facility: CLINIC | Age: 46
End: 2021-05-04

## 2021-05-20 DIAGNOSIS — G43.109 MIGRAINE WITH AURA AND WITHOUT STATUS MIGRAINOSUS, NOT INTRACTABLE: ICD-10-CM

## 2021-05-20 NOTE — TELEPHONE ENCOUNTER
Patient is s/p office visit May 1 with Jesus Diamond PA-C  Never received emgality even though it was approved  Should be sent to [facility]  Scripts attached, thank you

## 2021-05-27 ENCOUNTER — OFFICE VISIT (OUTPATIENT)
Dept: OTOLARYNGOLOGY | Facility: CLINIC | Age: 46
End: 2021-05-27
Payer: COMMERCIAL

## 2021-05-27 ENCOUNTER — OFFICE VISIT (OUTPATIENT)
Dept: AUDIOLOGY | Facility: CLINIC | Age: 46
End: 2021-05-27
Payer: COMMERCIAL

## 2021-05-27 VITALS
WEIGHT: 136.8 LBS | SYSTOLIC BLOOD PRESSURE: 112 MMHG | HEIGHT: 63 IN | RESPIRATION RATE: 16 BRPM | BODY MASS INDEX: 24.24 KG/M2 | DIASTOLIC BLOOD PRESSURE: 63 MMHG | TEMPERATURE: 98.1 F | HEART RATE: 65 BPM

## 2021-05-27 DIAGNOSIS — G93.2 PSEUDOTUMOR CEREBRI: ICD-10-CM

## 2021-05-27 DIAGNOSIS — H69.81 ETD (EUSTACHIAN TUBE DYSFUNCTION), RIGHT: ICD-10-CM

## 2021-05-27 DIAGNOSIS — Z98.2 S/P VP SHUNT: ICD-10-CM

## 2021-05-27 DIAGNOSIS — Z01.10 NORMAL HEARING TEST: ICD-10-CM

## 2021-05-27 DIAGNOSIS — H93.13 TINNITUS OF BOTH EARS: Primary | ICD-10-CM

## 2021-05-27 DIAGNOSIS — H93.A1 PULSATILE TINNITUS, RIGHT EAR: Primary | ICD-10-CM

## 2021-05-27 PROCEDURE — 92567 TYMPANOMETRY: CPT | Performed by: AUDIOLOGIST

## 2021-05-27 PROCEDURE — 99214 OFFICE O/P EST MOD 30 MIN: CPT | Performed by: OTOLARYNGOLOGY

## 2021-05-27 PROCEDURE — 92557 COMPREHENSIVE HEARING TEST: CPT | Performed by: AUDIOLOGIST

## 2021-05-27 PROCEDURE — 1036F TOBACCO NON-USER: CPT | Performed by: OTOLARYNGOLOGY

## 2021-05-27 PROCEDURE — 3008F BODY MASS INDEX DOCD: CPT | Performed by: OTOLARYNGOLOGY

## 2021-05-27 NOTE — PROGRESS NOTES
Otol[facility]ology Head and Neck Surgery History and Physical    Chief complaint    Chief Complaint   Patient presents with    F/u Tinnitus of the Right ear        History of the Present Illness    Independent Historian   N      Relationship  NA    Francesca Danielson is a 54 y o  who presents for re evaluation fo pulsatile tinnitus  Initially seen 4 months ago  She was experiencing intermittent pulsatile tinnitus of the right ear  History of pseudotumor cerebri and s/p  shunt  Audiogram at prior visit had shown a very mild conductive loss in both ears, R>L  Suspect possible ETD  Referred her for CTA h/n for completeness but she was unable to have this done due to fear of needles  Since prior visit her symptoms have been gradually improving  Still has pulsation intermittently but decreasing over time  She also has intermittent right ear fullness and feels the need to pop her ears  Review of Systems   Constitutional: Negative  HENT: Positive for tinnitus  Eyes: Negative  Respiratory: Negative  Cardiovascular: Negative  Gastrointestinal: Negative  Endocrine: Negative  Genitourinary: Negative  Musculoskeletal: Negative  Skin: Negative  Allergic/Immunologic: Negative  Neurological: Negative  Hematological: Negative  Psychiatric/Behavioral: Negative  Past Medical History:   Diagnosis Date    Abdominal pain     Brain condition     Pseudotumor Cerebri     Chronic kidney disease     De Quervain's disease (tenosynovitis)     Esophageal perforation     Gastric leak     GERD (gastroesophageal reflux disease)     Headache     Idiopathic intracranial hypertension     Kidney stone     Migraine     No blood products     per pt: personal and Scientologist beliefs   surgeon office aware 12/13/19    Papilledema, both eyes     PONV (postoperative nausea and vomiting)     Postgastrectomy malabsorption     Presence of lumboperitoneal shunt     Resolved: Sep 20, 2017   Gregg Betancur Rotator cuff tendinitis     Resolved: Aug 23, 2017    Visual field defect        Past Surgical History:   Procedure Laterality Date    CSF SHUNT      LP shunt - 2015 -  shunt - 2017    ESOPHAGOGASTRODUODENOSCOPY N/A 2/23/2018    Procedure: ESOPHAGOGASTRODUODENOSCOPY (EGD) WITH ESOPHAGEAL STENT PLACEMENT;  Surgeon: Charline Sauceda MD;  [facility];  Service: Thoracic    ESOPHAGOGASTRODUODENOSCOPY N/A 2/23/2018    Procedure: ESOPHAGOGASTRODUODENOSCOPY (EGD) WITH REMOVAL ESOPHAGEAL STENT  AND REPLACEMENT WITH 23mm X155mm [address];  Surgeon: Charline Sauceda MD;  [facility];  Service: Thoracic    ESOPHAGOGASTRODUODENOSCOPY N/A 3/28/2018    Procedure: ESOPHAGOGASTRODUODENOSCOPY (EGD) with PEJ placement ;  Surgeon: Earl Goodwin MD;  [facility]; Service: Gastroenterology    ESOPHAGOGASTRODUODENOSCOPY N/A 4/5/2018    Procedure: ESOPHAGOGASTRODUODENOSCOPY (EGD) with botox injection and kaofed placement;  Surgeon: Lieutenant Daniella DO;  [facility]; Service: Gastroenterology    ESOPHAGOGASTRODUODENOSCOPY N/A 4/10/2018    Procedure: ESOPHAGOGASTRODUODENOSCOPY (EGD) with Kaofed placement;  Surgeon: Sabrina Murphy MD;  [facility]; Service: Gastroenterology    ESOPHAGOSCOPY WITH STENT INSERTION N/A 1/24/2018    Procedure: INSERTION STENT ESOPHAGEAL;  Surgeon: Ela Galloway MD;  [facility]; Service: Gastroenterology    EYE SURGERY Bilateral 1980    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)     FL RETROGRADE PYELOGRAM  6/24/2020    GASTRIC BYPASS  12/19/2016    Lap sleeve gastrectomy w/shunt length shortening procedure    HIATAL HERNIA REPAIR      HYSTERECTOMY  03/14/2013    IR LUMBAR PUNCTURE  8/29/2018    LAPAROTOMY N/A 1/25/2018    Procedure: Exploratory Laparotomy, wash out,placement of drains, placement of NG feeding tube ;   Surgeon: Ravinder Price DO;  [facility];  Service: General    LUMBAR PERITONEAL SHUNT 11/19/2015    Laparoscopic assisted    DC CREATE SHUNT:VENTRIC-ATRIAL Right 12/18/2019    Procedure: IMAGE GUIDED INSERTION OF RIGHT CORONAL VENTRICULAR-ATRIAL SHUNT;  Surgeon: Patel Torres MD;  [facility];  Service: Neurosurgery    DC CREATE SHUNT:VENTRIC-PERITONEAL Right 5/31/2017    Procedure: IMAGE GUIDED CORONAL PLACEMENT OF PROGRAMABLE VENTRICULAR-PERITONEAL SHUNT, REMOVAL OF LP SHUNT ;  Surgeon: Patel Torres MD;  [facility];  Service: Neurosurgery    DC CYSTO/URETERO W/LITHOTRIPSY &INDWELL STENT INSRT Bilateral 6/24/2020    Procedure: CYSTOSCOPY URETEROSCOPY WITH LITHOTRIPSY HOLMIUM LASER, RETROGRADE PYELOGRAM AND exchange bilateral  STENTs URETERAL;  Surgeon: Barbara oLo MD;  [facility];  Service: Urology    DC CYSTOURETHROSCOPY,URETER CATHETER N/A 6/6/2020    Procedure: Bilateral CYSTOSCOPY RETROGRADE PYELOGRAM WITH INSERTION STENT URETERAL;  Surgeon: Latha Hussein MD;  Location: [address] MAIN OR;  Service: Urology    DC EGD TRANSORAL BIOPSY SINGLE/MULTIPLE N/A 6/27/2018    Procedure: ESOPHAGOGASTRODUODENOSCOPY (EGD) with padlock clip placement;  Surgeon: Amada Jimenez MD;  [facility];   Service: Clayton Nohemi CSF SHUNT,W/O REPLACE Right 2/5/2018    Procedure: Removal of  shunt;  Surgeon: Patel Torres MD;  [facility];  Service: Neurosurgery    DC REPLACEMENT/REVISION,CSF SHUNT Right 1/25/2018    Procedure: Externalization of right-sided SHUNT VENTRICULAR-PERITONEAL in anterior chest wall ribs two and three level  ;  Surgeon: Codi Thrasher MD;  [facility];  Service: Neurosurgery    WRIST SURGERY Right     x3 2006, 2008       Social History     Socioeconomic History    Marital status: Single     Spouse name: Not on file    Number of children: 2    Years of education: High School or GED     Highest education level: Not on file   Occupational History    Occupation: employed    Social Needs    Financial resource strain: Not on file    Food insecurity     Worry: Not on file     Inability: Not on file    Transportation needs     Medical: Not on file     Non-medical: Not on file   Tobacco Use    Smoking status: Former Smoker     Packs/day: 0 50     Years: 20 00     Pack years: 10 00     Quit date: 2012     Years since quittin 7    Smokeless tobacco: Never Used   Substance and Sexual Activity    Alcohol use: Never     Frequency: Never     Binge frequency: Never    Drug use: Yes     Frequency: 7 0 times per week     Types: Marijuana     Comment: medical marijuana, vaping & topical     Sexual activity: Yes   Lifestyle    Physical activity     Days per week: Not on file     Minutes per session: Not on file    Stress: Not on file   Relationships    Social connections     Talks on phone: Not on file     Gets together: Not on file     Attends Episcopalian service: Not on file     Active member of club or organization: Not on file     Attends meetings of clubs or organizations: Not on file     Relationship status: Not on file    Intimate partner violence     Fear of current or ex partner: Not on file     Emotionally abused: Not on file     Physically abused: Not on file     Forced sexual activity: Not on file   Other Topics Concern    Not on file   Social History Narrative    ** Merged History Encounter **            Family History   Problem Relation Age of Onset    No Known Problems Mother     No Known Problems Father     Thyroid cancer Brother     Colon cancer Maternal Grandfather     Cancer Paternal Grandmother     Cancer Paternal Grandfather     Cancer Family         Gastric    Leukemia Family     Colon cancer Family     Pancreatic cancer Family            /63 (BP Location: Right arm, Patient Position: Sitting, Cuff Size: Standard)   Pulse 65   Temp 98 1 °F (36 7 °C) (Temporal)   Resp 16   Ht 5' 3" (1 6 m)   Wt 62 1 kg (136 lb 12 8 oz)   LMP  (LMP Unknown)   BMI 24 23 kg/m² Current Outpatient Medications:     calcium citrate-vitamin D (CITRACAL+D) 315-200 MG-UNIT per tablet, Take 1 tablet by mouth 2 (two) times a day, Disp: , Rfl:     dexlansoprazole (Dexilant) 60 MG capsule, Take 1 capsule (60 mg total) by mouth daily, Disp: 30 capsule, Rfl: 2    Galcanezumab-gnlm 120 MG/ML SOAJ, Inject 1 pen under the skin every 30 (thirty) days, Disp: 1 mL, Rfl: 11    Multiple Vitamin (MULTIVITAMIN) tablet, Take 1 tablet by mouth daily Wears a patch, Disp: , Rfl:     NON FORMULARY, , Disp: , Rfl:      Physical Exam      Procedure:          Pertinent Notes / Tests / Data reviewed        Data with independent Interpretation            Assessment and plan:    1  Pulsatile tinnitus, right ear     2  Pseudotumor cerebri     3  S/P  shunt     4  Sensorineural hearing loss (SNHL), bilateral     5  ETD (Eustachian tube dysfunction), right         Audiogram today demonstrates bilateral normal hearing with type A tymps and excellent WRS  Prior conductive gap has closed at this point  Discussed likely causes of pulsatile tinnitus  In her case likely associated with ETD  We discussed options for further evaluation including imaging to rule out vascular source  She has a fear of needles and therefore cannot get IV contrast  Could consider U/S carotids vs MRA w/o contrast  Would need to check with neurosurg before MRA due to  shunt  After discussion, she would prefer to proceed with u/s carotids  She can follow up afterwards to review  If normal can push back follow up to 1 year

## 2021-06-29 NOTE — OP NOTE
Medication:  PDMP  04/21/2021  1  04/21/2021  ESZOPICLONE 3 MG TABLET  30 0  30  KI GRA  [id number]  PENNS (1932)  0   [place]  [place]    01/08/2021  1  01/08/2021  ALPRAZOLAM 0 25 MG TABLET  30 0  7  SHYLA BABAK  [id number]  PENNS (1932)  0   [place] Ins  [place]    01/08/2021  1  01/08/2021  ESZOPICLONE 3 MG TABLET  30 0  30  SHYLA BABAK  [id number]  PENNS            Active agreement on file -No OPERATIVE REPORT  PATIENT NAME: Shefali Foss    :  1975  MRN: [id number]  Pt Location: [facility]    SURGERY DATE: 2018    Surgeon(s) and Role:     * Halley Zheng MD - Primary    Preop Diagnosis:  Bariatric surgery status [Z98 84]    Post-Op Diagnosis Codes: * Bariatric surgery status [Z98 84]    Procedure(s) (LRB):  ESOPHAGOGASTRODUODENOSCOPY (EGD) (N/A)    Specimen(s):  * No specimens in log *    Estimated Blood Loss:   Minimal    Drains:  Closed/Suction Drain Midline;Superior Abdomen Other (Comment) 10 Fr  (Active)   Site Description Healing 2018  9:49 AM   Dressing Status Clean;Dry; Intact 2018 10:21 AM   Drainage Appearance Straw 2018 10:21 AM   Status To bulb suction 2018  9:49 AM   Number of days: 131       NG/OG/Enteral Tube Nasogastric 8 Fr Left nares (Active)   Site Assessment Clean;Dry; Intact 2018 10:17 AM   Status Clamped 2018 10:17 AM   Number of days: 78       NG/OG/Enteral Tube (Active)   Number of days:        Anesthesia Type:   IV Sedation with Anesthesia    Operative Indications:  Bariatric surgery status [Z98 84]      Operative Findings:  Gastric cutaneous fistula  GE junction   Esophagitis LA grade III    Complications:   None    Procedure and Technique:  Upper endoscopy and closure of fistula using endoscopic padlock device   I was present for the entire procedure    Patient Disposition:  hemodynamically stable             Indication:   Patient suffers from morbid obesity status post laparoscopic sleeve gastrectomy followed by paraesophageal hernia repair at which time she developed a leak at her GE junction probably because of esophageal perforation patient was managed at [facility] and was transferred to me for closure of her fistula endoscopically    Description of the procedure: The patient was brought to the endoscopy suite and was placed in  left lateral decubitus position    A time-out was called and the patient was identified by name and by armband  The patient received IV  Propofol under closed monitoring  by the anesthesiologist and nurse anesthesist     Upper endoscopy was performed under direct visualization  Esophagus was visualized and showed severe esophagitis, nasal feeding tube was in place and was past the pylorus   The GE junction showed evidence of fistula along its lateral aspect, overtube was placed under direct visualization to protect the upper esophagus and then the padlock was mounted on the endoscope and the endoscope was placed thru the overtube and  device applied at the site of the fistula with good approximation of surrounding tissue  The stomach was then inspected and showed normal findings    First and second portion of duodenum were inspected and appeared to be normal        The patient tolerated the procedure well and was transferred to the recovery unit in stable condition           I    SIGNATURE: Gianluca Celaya MD  DATE: June 27, 2018  TIME: 11:13 AM

## 2021-07-21 ENCOUNTER — TELEPHONE (OUTPATIENT)
Dept: BARIATRICS | Facility: CLINIC | Age: 46
End: 2021-07-21

## 2021-07-21 DIAGNOSIS — K21.9 GASTROESOPHAGEAL REFLUX DISEASE, UNSPECIFIED WHETHER ESOPHAGITIS PRESENT: Primary | ICD-10-CM

## 2021-07-21 RX ORDER — PANTOPRAZOLE SODIUM 40 MG/1
40 TABLET, DELAYED RELEASE ORAL 2 TIMES DAILY
Qty: 180 TABLET | Refills: 0 | Status: SHIPPED | OUTPATIENT
Start: 2021-07-21 | End: 2021-10-01

## 2021-07-21 NOTE — TELEPHONE ENCOUNTER
Patient was called that a 90 day supply was placed at [facility]   Patient acknowledge understanding

## 2021-07-30 ENCOUNTER — TELEPHONE (OUTPATIENT)
Dept: NEUROLOGY | Facility: CLINIC | Age: 46
End: 2021-07-30

## 2021-07-30 DIAGNOSIS — Z86.69 HISTORY OF IDIOPATHIC INTRACRANIAL HYPERTENSION: Primary | ICD-10-CM

## 2021-07-30 RX ORDER — SUCRALFATE 1 G/1
1 TABLET ORAL 3 TIMES DAILY PRN
Qty: 15 TABLET | Refills: 0 | Status: SHIPPED | OUTPATIENT
Start: 2021-07-30 | End: 2021-08-23 | Stop reason: HOSPADM

## 2021-07-30 RX ORDER — INDOMETHACIN 25 MG/1
25 CAPSULE ORAL
Qty: 15 CAPSULE | Refills: 0 | Status: SHIPPED | OUTPATIENT
Start: 2021-07-30 | End: 2021-08-23 | Stop reason: HOSPADM

## 2021-07-30 NOTE — TELEPHONE ENCOUNTER
Patient aware of recommendation  She is hesitant to take NSAID due to hx of bariatric and subsequent surgery; discussed short term treatment (5 days; take carafate prior)  Suggested she call bariatric surgeon  for reassurance if necessary  Spoke to AURELIO Martínez who said although always a small risk, patient would benefit by medication  She will call neurosurgeon to discuss shunt  All questions/concerns addressed at this time

## 2021-07-30 NOTE — TELEPHONE ENCOUNTER
Patient of Faina Linn, last office visit 4/30/2021,  s/p shunt for intracranial pressure, hx  gastric bypass     She said she's been on emgality x 2 months  without improvement of symptoms  She is reporting neck pain and  current right-sided head pressure, radiates to generalized head pain;  feels like her skull is "smashed and her brain is trying to expand 10/10" pain  She said it does not feel like a migraine/headache and asked if it could be from her shunt  She has  occassional vomiting but said that could be caused by her  multiple health problems as well  Is not taking any other abortives; Uses medical marijuana with improvement n symptoms  but intereferes with ADLs and driving     We discussed may take at least 3 months to see improvement on emgality  Please provide recommendation, thanks for your help       best  [phone], okay to leave detailed message

## 2021-07-30 NOTE — TELEPHONE ENCOUNTER
If she feels like it is her shunt not working, she should reach out to neurosurgery  Emgality can take 3 months before effective    We could try indomethacin 25 mg if she would like  Take sucralfate 30 minutes prior to the indomethacin    Sent to pharm if she would like to use

## 2021-08-21 ENCOUNTER — ANESTHESIA (OUTPATIENT)
Dept: PERIOP | Facility: HOSPITAL | Age: 46
End: 2021-08-21
Payer: COMMERCIAL

## 2021-08-21 ENCOUNTER — APPOINTMENT (EMERGENCY)
Dept: RADIOLOGY | Facility: HOSPITAL | Age: 46
End: 2021-08-21
Payer: COMMERCIAL

## 2021-08-21 ENCOUNTER — ANESTHESIA EVENT (OUTPATIENT)
Dept: PERIOP | Facility: HOSPITAL | Age: 46
End: 2021-08-21
Payer: COMMERCIAL

## 2021-08-21 ENCOUNTER — APPOINTMENT (OUTPATIENT)
Dept: RADIOLOGY | Facility: HOSPITAL | Age: 46
End: 2021-08-21
Payer: COMMERCIAL

## 2021-08-21 ENCOUNTER — HOSPITAL ENCOUNTER (OUTPATIENT)
Facility: HOSPITAL | Age: 46
Setting detail: OBSERVATION
Discharge: HOME | End: 2021-08-23
Attending: EMERGENCY MEDICINE | Admitting: INTERNAL MEDICINE
Payer: COMMERCIAL

## 2021-08-21 DIAGNOSIS — N20.0 NEPHROLITHIASIS: ICD-10-CM

## 2021-08-21 DIAGNOSIS — R11.0 NAUSEA: ICD-10-CM

## 2021-08-21 DIAGNOSIS — N13.30 HYDRONEPHROSIS: ICD-10-CM

## 2021-08-21 DIAGNOSIS — N20.0 RENAL CALCULI: Primary | ICD-10-CM

## 2021-08-21 DIAGNOSIS — R52 INTRACTABLE PAIN: ICD-10-CM

## 2021-08-21 DIAGNOSIS — N20.1 LEFT URETERAL STONE: ICD-10-CM

## 2021-08-21 LAB
ALBUMIN SERPL BCP-MCNC: 3.4 G/DL (ref 3.5–5)
ALP SERPL-CCNC: 58 U/L (ref 46–116)
ALT SERPL W P-5'-P-CCNC: 16 U/L (ref 12–78)
ANION GAP SERPL CALCULATED.3IONS-SCNC: 6 MMOL/L (ref 4–13)
AST SERPL W P-5'-P-CCNC: 11 U/L (ref 5–45)
BACTERIA UR QL AUTO: ABNORMAL /HPF
BASOPHILS # BLD AUTO: 0.09 THOUSANDS/ΜL (ref 0–0.1)
BASOPHILS NFR BLD AUTO: 1 % (ref 0–1)
BILIRUB SERPL-MCNC: 0.22 MG/DL (ref 0.2–1)
BILIRUB UR QL STRIP: NEGATIVE
BUN SERPL-MCNC: 20 MG/DL (ref 5–25)
CALCIUM ALBUM COR SERPL-MCNC: 9.2 MG/DL (ref 8.3–10.1)
CALCIUM SERPL-MCNC: 8.7 MG/DL (ref 8.3–10.1)
CHLORIDE SERPL-SCNC: 105 MMOL/L (ref 100–108)
CLARITY UR: ABNORMAL
CO2 SERPL-SCNC: 28 MMOL/L (ref 21–32)
COLOR UR: ABNORMAL
CREAT SERPL-MCNC: 0.65 MG/DL (ref 0.6–1.3)
EOSINOPHIL # BLD AUTO: 0.3 THOUSAND/ΜL (ref 0–0.61)
EOSINOPHIL NFR BLD AUTO: 2 % (ref 0–6)
ERYTHROCYTE [DISTWIDTH] IN BLOOD BY AUTOMATED COUNT: 21.6 % (ref 11.6–15.1)
EXT PREG TEST URINE: NEGATIVE
EXT. CONTROL ED NAV: NORMAL
GFR SERPL CREATININE-BSD FRML MDRD: 107 ML/MIN/1.73SQ M
GLUCOSE SERPL-MCNC: 104 MG/DL (ref 65–140)
GLUCOSE UR STRIP-MCNC: NEGATIVE MG/DL
HCT VFR BLD AUTO: 30.4 % (ref 34.8–46.1)
HGB BLD-MCNC: 8.7 G/DL (ref 11.5–15.4)
HGB UR QL STRIP.AUTO: ABNORMAL
HYALINE CASTS #/AREA URNS LPF: ABNORMAL /LPF
IMM GRANULOCYTES # BLD AUTO: 0.09 THOUSAND/UL (ref 0–0.2)
IMM GRANULOCYTES NFR BLD AUTO: 1 % (ref 0–2)
KETONES UR STRIP-MCNC: ABNORMAL MG/DL
LEUKOCYTE ESTERASE UR QL STRIP: ABNORMAL
LIPASE SERPL-CCNC: 114 U/L (ref 73–393)
LYMPHOCYTES # BLD AUTO: 1.53 THOUSANDS/ΜL (ref 0.6–4.47)
LYMPHOCYTES NFR BLD AUTO: 10 % (ref 14–44)
MCH RBC QN AUTO: 22.3 PG (ref 26.8–34.3)
MCHC RBC AUTO-ENTMCNC: 28.6 G/DL (ref 31.4–37.4)
MCV RBC AUTO: 78 FL (ref 82–98)
MONOCYTES # BLD AUTO: 0.9 THOUSAND/ΜL (ref 0.17–1.22)
MONOCYTES NFR BLD AUTO: 6 % (ref 4–12)
NEUTROPHILS # BLD AUTO: 11.98 THOUSANDS/ΜL (ref 1.85–7.62)
NEUTS SEG NFR BLD AUTO: 80 % (ref 43–75)
NITRITE UR QL STRIP: NEGATIVE
NON-SQ EPI CELLS URNS QL MICRO: ABNORMAL /HPF
NRBC BLD AUTO-RTO: 0 /100 WBCS
PH UR STRIP.AUTO: 6 [PH]
PLATELET # BLD AUTO: 371 THOUSANDS/UL (ref 149–390)
PMV BLD AUTO: 9.8 FL (ref 8.9–12.7)
POTASSIUM SERPL-SCNC: 3.8 MMOL/L (ref 3.5–5.3)
PROT SERPL-MCNC: 6.6 G/DL (ref 6.4–8.2)
PROT UR STRIP-MCNC: ABNORMAL MG/DL
RBC # BLD AUTO: 3.91 MILLION/UL (ref 3.81–5.12)
RBC #/AREA URNS AUTO: ABNORMAL /HPF
SODIUM SERPL-SCNC: 139 MMOL/L (ref 136–145)
SP GR UR STRIP.AUTO: 1.02 (ref 1–1.03)
UROBILINOGEN UR QL STRIP.AUTO: 1 E.U./DL
WBC # BLD AUTO: 14.89 THOUSAND/UL (ref 4.31–10.16)
WBC #/AREA URNS AUTO: ABNORMAL /HPF

## 2021-08-21 PROCEDURE — 87147 CULTURE TYPE IMMUNOLOGIC: CPT | Performed by: EMERGENCY MEDICINE

## 2021-08-21 PROCEDURE — 80053 COMPREHEN METABOLIC PANEL: CPT | Performed by: EMERGENCY MEDICINE

## 2021-08-21 PROCEDURE — 74176 CT ABD & PELVIS W/O CONTRAST: CPT

## 2021-08-21 PROCEDURE — 52332 CYSTOSCOPY AND TREATMENT: CPT | Performed by: UROLOGY

## 2021-08-21 PROCEDURE — 83690 ASSAY OF LIPASE: CPT | Performed by: EMERGENCY MEDICINE

## 2021-08-21 PROCEDURE — 96375 TX/PRO/DX INJ NEW DRUG ADDON: CPT

## 2021-08-21 PROCEDURE — 81001 URINALYSIS AUTO W/SCOPE: CPT | Performed by: EMERGENCY MEDICINE

## 2021-08-21 PROCEDURE — 96376 TX/PRO/DX INJ SAME DRUG ADON: CPT

## 2021-08-21 PROCEDURE — 74420 UROGRAPHY RTRGR +-KUB: CPT

## 2021-08-21 PROCEDURE — 96365 THER/PROPH/DIAG IV INF INIT: CPT

## 2021-08-21 PROCEDURE — 36415 COLL VENOUS BLD VENIPUNCTURE: CPT | Performed by: EMERGENCY MEDICINE

## 2021-08-21 PROCEDURE — 99245 OFF/OP CONSLTJ NEW/EST HI 55: CPT | Performed by: UROLOGY

## 2021-08-21 PROCEDURE — 82360 CALCULUS ASSAY QUANT: CPT | Performed by: UROLOGY

## 2021-08-21 PROCEDURE — C2617 STENT, NON-COR, TEM W/O DEL: HCPCS | Performed by: UROLOGY

## 2021-08-21 PROCEDURE — 52352 CYSTOURETERO W/STONE REMOVE: CPT | Performed by: UROLOGY

## 2021-08-21 PROCEDURE — C1769 GUIDE WIRE: HCPCS | Performed by: UROLOGY

## 2021-08-21 PROCEDURE — C1758 CATHETER, URETERAL: HCPCS | Performed by: UROLOGY

## 2021-08-21 PROCEDURE — 81025 URINE PREGNANCY TEST: CPT | Performed by: EMERGENCY MEDICINE

## 2021-08-21 PROCEDURE — 99218 PR INITIAL OBSERVATION CARE/DAY 30 MINUTES: CPT | Performed by: INTERNAL MEDICINE

## 2021-08-21 PROCEDURE — 99285 EMERGENCY DEPT VISIT HI MDM: CPT

## 2021-08-21 PROCEDURE — 85025 COMPLETE CBC W/AUTO DIFF WBC: CPT | Performed by: EMERGENCY MEDICINE

## 2021-08-21 PROCEDURE — 87086 URINE CULTURE/COLONY COUNT: CPT | Performed by: EMERGENCY MEDICINE

## 2021-08-21 PROCEDURE — 99285 EMERGENCY DEPT VISIT HI MDM: CPT | Performed by: EMERGENCY MEDICINE

## 2021-08-21 DEVICE — INLAY OPTIMA URETERAL STENT W/O GUIDEWIRE
Type: IMPLANTABLE DEVICE | Site: URETER | Status: FUNCTIONAL
Brand: BARD® INLAY OPTIMA® URETERAL STENT

## 2021-08-21 RX ORDER — METOCLOPRAMIDE HYDROCHLORIDE 5 MG/ML
10 INJECTION INTRAMUSCULAR; INTRAVENOUS ONCE AS NEEDED
Status: DISCONTINUED | OUTPATIENT
Start: 2021-08-21 | End: 2021-08-21 | Stop reason: HOSPADM

## 2021-08-21 RX ORDER — ONDANSETRON 2 MG/ML
INJECTION INTRAMUSCULAR; INTRAVENOUS AS NEEDED
Status: DISCONTINUED | OUTPATIENT
Start: 2021-08-21 | End: 2021-08-21

## 2021-08-21 RX ORDER — MAGNESIUM HYDROXIDE 1200 MG/15ML
LIQUID ORAL AS NEEDED
Status: DISCONTINUED | OUTPATIENT
Start: 2021-08-21 | End: 2021-08-21 | Stop reason: HOSPADM

## 2021-08-21 RX ORDER — PHENAZOPYRIDINE HYDROCHLORIDE 100 MG/1
100 TABLET, FILM COATED ORAL
Status: DISCONTINUED | OUTPATIENT
Start: 2021-08-21 | End: 2021-08-22

## 2021-08-21 RX ORDER — PROPOFOL 10 MG/ML
INJECTION, EMULSION INTRAVENOUS CONTINUOUS PRN
Status: DISCONTINUED | OUTPATIENT
Start: 2021-08-21 | End: 2021-08-21

## 2021-08-21 RX ORDER — ONDANSETRON 2 MG/ML
4 INJECTION INTRAMUSCULAR; INTRAVENOUS EVERY 6 HOURS PRN
Status: DISCONTINUED | OUTPATIENT
Start: 2021-08-21 | End: 2021-08-22

## 2021-08-21 RX ORDER — FENTANYL CITRATE/PF 50 MCG/ML
25 SYRINGE (ML) INJECTION
Status: DISCONTINUED | OUTPATIENT
Start: 2021-08-21 | End: 2021-08-21 | Stop reason: HOSPADM

## 2021-08-21 RX ORDER — PROPOFOL 10 MG/ML
INJECTION, EMULSION INTRAVENOUS AS NEEDED
Status: DISCONTINUED | OUTPATIENT
Start: 2021-08-21 | End: 2021-08-21

## 2021-08-21 RX ORDER — DEXAMETHASONE SODIUM PHOSPHATE 10 MG/ML
INJECTION, SOLUTION INTRAMUSCULAR; INTRAVENOUS AS NEEDED
Status: DISCONTINUED | OUTPATIENT
Start: 2021-08-21 | End: 2021-08-21

## 2021-08-21 RX ORDER — HYDROMORPHONE HCL/PF 1 MG/ML
1 SYRINGE (ML) INJECTION ONCE
Status: COMPLETED | OUTPATIENT
Start: 2021-08-21 | End: 2021-08-21

## 2021-08-21 RX ORDER — HYDROMORPHONE HCL/PF 1 MG/ML
0.5 SYRINGE (ML) INJECTION
Status: DISCONTINUED | OUTPATIENT
Start: 2021-08-21 | End: 2021-08-23 | Stop reason: HOSPADM

## 2021-08-21 RX ORDER — ONDANSETRON 2 MG/ML
4 INJECTION INTRAMUSCULAR; INTRAVENOUS ONCE
Status: COMPLETED | OUTPATIENT
Start: 2021-08-21 | End: 2021-08-21

## 2021-08-21 RX ORDER — TAMSULOSIN HYDROCHLORIDE 0.4 MG/1
0.4 CAPSULE ORAL
Status: DISCONTINUED | OUTPATIENT
Start: 2021-08-21 | End: 2021-08-23 | Stop reason: HOSPADM

## 2021-08-21 RX ORDER — PANTOPRAZOLE SODIUM 40 MG/1
40 TABLET, DELAYED RELEASE ORAL 2 TIMES DAILY
Status: DISCONTINUED | OUTPATIENT
Start: 2021-08-21 | End: 2021-08-23 | Stop reason: HOSPADM

## 2021-08-21 RX ORDER — ALBUTEROL SULFATE 2.5 MG/3ML
2.5 SOLUTION RESPIRATORY (INHALATION) ONCE AS NEEDED
Status: DISCONTINUED | OUTPATIENT
Start: 2021-08-21 | End: 2021-08-21 | Stop reason: HOSPADM

## 2021-08-21 RX ORDER — ONDANSETRON 2 MG/ML
4 INJECTION INTRAMUSCULAR; INTRAVENOUS ONCE AS NEEDED
Status: DISCONTINUED | OUTPATIENT
Start: 2021-08-21 | End: 2021-08-21 | Stop reason: HOSPADM

## 2021-08-21 RX ORDER — FENTANYL CITRATE 50 UG/ML
1 INJECTION, SOLUTION INTRAMUSCULAR; INTRAVENOUS ONCE
Status: COMPLETED | OUTPATIENT
Start: 2021-08-21 | End: 2021-08-21

## 2021-08-21 RX ORDER — SODIUM CHLORIDE, SODIUM GLUCONATE, SODIUM ACETATE, POTASSIUM CHLORIDE, MAGNESIUM CHLORIDE, SODIUM PHOSPHATE, DIBASIC, AND POTASSIUM PHOSPHATE .53; .5; .37; .037; .03; .012; .00082 G/100ML; G/100ML; G/100ML; G/100ML; G/100ML; G/100ML; G/100ML
125 INJECTION, SOLUTION INTRAVENOUS CONTINUOUS
Status: DISCONTINUED | OUTPATIENT
Start: 2021-08-21 | End: 2021-08-23 | Stop reason: HOSPADM

## 2021-08-21 RX ORDER — KETOROLAC TROMETHAMINE 30 MG/ML
1 INJECTION, SOLUTION INTRAMUSCULAR; INTRAVENOUS ONCE
Status: COMPLETED | OUTPATIENT
Start: 2021-08-21 | End: 2021-08-21

## 2021-08-21 RX ORDER — ACETAMINOPHEN 325 MG/1
650 TABLET ORAL EVERY 6 HOURS SCHEDULED
Status: DISCONTINUED | OUTPATIENT
Start: 2021-08-22 | End: 2021-08-23 | Stop reason: HOSPADM

## 2021-08-21 RX ORDER — LIDOCAINE HYDROCHLORIDE 10 MG/ML
INJECTION, SOLUTION EPIDURAL; INFILTRATION; INTRACAUDAL; PERINEURAL AS NEEDED
Status: DISCONTINUED | OUTPATIENT
Start: 2021-08-21 | End: 2021-08-21

## 2021-08-21 RX ORDER — HYDROMORPHONE HCL/PF 1 MG/ML
0.5 SYRINGE (ML) INJECTION ONCE
Status: COMPLETED | OUTPATIENT
Start: 2021-08-21 | End: 2021-08-21

## 2021-08-21 RX ORDER — ONDANSETRON 2 MG/ML
1 INJECTION INTRAMUSCULAR; INTRAVENOUS ONCE
Status: COMPLETED | OUTPATIENT
Start: 2021-08-21 | End: 2021-08-21

## 2021-08-21 RX ORDER — ACETAMINOPHEN 325 MG/1
650 TABLET ORAL EVERY 6 HOURS PRN
Status: DISCONTINUED | OUTPATIENT
Start: 2021-08-21 | End: 2021-08-21

## 2021-08-21 RX ORDER — KETOROLAC TROMETHAMINE 30 MG/ML
30 INJECTION, SOLUTION INTRAMUSCULAR; INTRAVENOUS EVERY 6 HOURS PRN
Status: DISCONTINUED | OUTPATIENT
Start: 2021-08-21 | End: 2021-08-22

## 2021-08-21 RX ORDER — FENTANYL CITRATE 50 UG/ML
INJECTION, SOLUTION INTRAMUSCULAR; INTRAVENOUS AS NEEDED
Status: DISCONTINUED | OUTPATIENT
Start: 2021-08-21 | End: 2021-08-21

## 2021-08-21 RX ORDER — MIDAZOLAM HYDROCHLORIDE 2 MG/2ML
INJECTION, SOLUTION INTRAMUSCULAR; INTRAVENOUS AS NEEDED
Status: DISCONTINUED | OUTPATIENT
Start: 2021-08-21 | End: 2021-08-21

## 2021-08-21 RX ORDER — LIDOCAINE 50 MG/G
1 PATCH TOPICAL DAILY PRN
Status: DISCONTINUED | OUTPATIENT
Start: 2021-08-21 | End: 2021-08-23 | Stop reason: HOSPADM

## 2021-08-21 RX ORDER — EPHEDRINE SULFATE 50 MG/ML
INJECTION INTRAVENOUS AS NEEDED
Status: DISCONTINUED | OUTPATIENT
Start: 2021-08-21 | End: 2021-08-21

## 2021-08-21 RX ORDER — KETOROLAC TROMETHAMINE 30 MG/ML
30 INJECTION, SOLUTION INTRAMUSCULAR; INTRAVENOUS EVERY 6 HOURS PRN
Status: DISCONTINUED | OUTPATIENT
Start: 2021-08-21 | End: 2021-08-21

## 2021-08-21 RX ADMIN — LIDOCAINE HYDROCHLORIDE 89 MG: 10 INJECTION, SOLUTION EPIDURAL; INFILTRATION; INTRACAUDAL; PERINEURAL at 03:47

## 2021-08-21 RX ADMIN — HYDROMORPHONE HYDROCHLORIDE 0.5 MG: 1 INJECTION, SOLUTION INTRAMUSCULAR; INTRAVENOUS; SUBCUTANEOUS at 19:43

## 2021-08-21 RX ADMIN — PROPOFOL 100 MCG/KG/MIN: 10 INJECTION, EMULSION INTRAVENOUS at 11:28

## 2021-08-21 RX ADMIN — KETOROLAC TROMETHAMINE 30 MG: 30 INJECTION, SOLUTION INTRAMUSCULAR; INTRAVENOUS at 23:32

## 2021-08-21 RX ADMIN — KETOROLAC TROMETHAMINE 30 MG: 30 INJECTION, SOLUTION INTRAMUSCULAR; INTRAVENOUS at 16:26

## 2021-08-21 RX ADMIN — HYDROMORPHONE HYDROCHLORIDE 0.5 MG: 1 INJECTION, SOLUTION INTRAMUSCULAR; INTRAVENOUS; SUBCUTANEOUS at 21:44

## 2021-08-21 RX ADMIN — ONDANSETRON 4 MG: 2 INJECTION INTRAMUSCULAR; INTRAVENOUS at 11:29

## 2021-08-21 RX ADMIN — PHENAZOPYRIDINE 100 MG: 100 TABLET ORAL at 16:26

## 2021-08-21 RX ADMIN — ACETAMINOPHEN 650 MG: 325 TABLET, FILM COATED ORAL at 13:42

## 2021-08-21 RX ADMIN — DEXAMETHASONE SODIUM PHOSPHATE 5 MG: 10 INJECTION, SOLUTION INTRAMUSCULAR; INTRAVENOUS at 11:29

## 2021-08-21 RX ADMIN — EPHEDRINE SULFATE 10 MG: 50 INJECTION, SOLUTION INTRAVENOUS at 11:38

## 2021-08-21 RX ADMIN — PROPOFOL 100 MG: 10 INJECTION, EMULSION INTRAVENOUS at 11:25

## 2021-08-21 RX ADMIN — ACETAMINOPHEN 650 MG: 325 TABLET, FILM COATED ORAL at 23:32

## 2021-08-21 RX ADMIN — MIDAZOLAM 2 MG: 1 INJECTION INTRAMUSCULAR; INTRAVENOUS at 11:20

## 2021-08-21 RX ADMIN — TAMSULOSIN HYDROCHLORIDE 0.4 MG: 0.4 CAPSULE ORAL at 16:26

## 2021-08-21 RX ADMIN — HYDROMORPHONE HYDROCHLORIDE 0.5 MG: 1 INJECTION, SOLUTION INTRAMUSCULAR; INTRAVENOUS; SUBCUTANEOUS at 10:16

## 2021-08-21 RX ADMIN — SODIUM CHLORIDE, SODIUM GLUCONATE, SODIUM ACETATE, POTASSIUM CHLORIDE, MAGNESIUM CHLORIDE, SODIUM PHOSPHATE, DIBASIC, AND POTASSIUM PHOSPHATE 125 ML/HR: .53; .5; .37; .037; .03; .012; .00082 INJECTION, SOLUTION INTRAVENOUS at 13:46

## 2021-08-21 RX ADMIN — TRIMETHOBENZAMIDE HYDROCHLORIDE 200 MG: 100 INJECTION INTRAMUSCULAR at 21:05

## 2021-08-21 RX ADMIN — SODIUM CHLORIDE, SODIUM GLUCONATE, SODIUM ACETATE, POTASSIUM CHLORIDE, MAGNESIUM CHLORIDE, SODIUM PHOSPHATE, DIBASIC, AND POTASSIUM PHOSPHATE 125 ML/HR: .53; .5; .37; .037; .03; .012; .00082 INJECTION, SOLUTION INTRAVENOUS at 09:32

## 2021-08-21 RX ADMIN — EPHEDRINE SULFATE 5 MG: 50 INJECTION, SOLUTION INTRAVENOUS at 11:29

## 2021-08-21 RX ADMIN — HYDROMORPHONE HYDROCHLORIDE 1 MG: 1 INJECTION, SOLUTION INTRAMUSCULAR; INTRAVENOUS; SUBCUTANEOUS at 05:54

## 2021-08-21 RX ADMIN — ONDANSETRON 4 MG: 2 INJECTION INTRAMUSCULAR; INTRAVENOUS at 16:40

## 2021-08-21 RX ADMIN — LIDOCAINE HYDROCHLORIDE 50 MG: 10 INJECTION, SOLUTION EPIDURAL; INFILTRATION; INTRACAUDAL; PERINEURAL at 11:25

## 2021-08-21 RX ADMIN — SODIUM CHLORIDE, SODIUM GLUCONATE, SODIUM ACETATE, POTASSIUM CHLORIDE, MAGNESIUM CHLORIDE, SODIUM PHOSPHATE, DIBASIC, AND POTASSIUM PHOSPHATE 125 ML/HR: .53; .5; .37; .037; .03; .012; .00082 INJECTION, SOLUTION INTRAVENOUS at 21:20

## 2021-08-21 RX ADMIN — ONDANSETRON 4 MG: 2 INJECTION INTRAMUSCULAR; INTRAVENOUS at 10:16

## 2021-08-21 RX ADMIN — ONDANSETRON 4 MG: 2 INJECTION INTRAMUSCULAR; INTRAVENOUS at 05:53

## 2021-08-21 RX ADMIN — FENTANYL CITRATE 25 MCG: 50 INJECTION INTRAMUSCULAR; INTRAVENOUS at 11:42

## 2021-08-21 RX ADMIN — EPHEDRINE SULFATE 5 MG: 50 INJECTION, SOLUTION INTRAVENOUS at 11:25

## 2021-08-21 RX ADMIN — ONDANSETRON 4 MG: 2 INJECTION INTRAMUSCULAR; INTRAVENOUS at 23:32

## 2021-08-21 RX ADMIN — PANTOPRAZOLE SODIUM 40 MG: 40 TABLET, DELAYED RELEASE ORAL at 21:18

## 2021-08-21 RX ADMIN — HYDROMORPHONE HYDROCHLORIDE 1 MG: 1 INJECTION, SOLUTION INTRAMUSCULAR; INTRAVENOUS; SUBCUTANEOUS at 02:55

## 2021-08-21 RX ADMIN — Medication 1000 MG: at 11:20

## 2021-08-21 RX ADMIN — Medication 1000 MG: at 09:49

## 2021-08-21 RX ADMIN — HYDROMORPHONE HYDROCHLORIDE 0.5 MG: 1 INJECTION, SOLUTION INTRAMUSCULAR; INTRAVENOUS; SUBCUTANEOUS at 14:35

## 2021-08-21 RX ADMIN — HYDROMORPHONE HYDROCHLORIDE 1 MG: 1 INJECTION, SOLUTION INTRAMUSCULAR; INTRAVENOUS; SUBCUTANEOUS at 01:38

## 2021-08-21 NOTE — CONSULTS
UROLOGY CONSULTATION NOTE     Patient Identifiers: Ho Laboy (MRN [id number])  Service Requesting Consultation:  ER/Medicine  Service Providing Consultation:  Urology, Kimberlee Ayon MD    Date of Service: 8/21/2021  Consults    Reason for Consultation:  Left ureteral calculus    History of Present Illness:     Ho Laboy is a 54 y o  old with a history of nephrolithiasis and ureteral calculi  She has passed 1 stone spontaneously in her lifetime, but otherwise has had numerous calculi requiring intervention  She underwent ureteroscopy last year by 1 of my colleagues  She reports onset of flank pain about a week ago and she figured that this was a stone  She began to hydrate well with lemon water  Nevertheless he developed severe renal colic overnight and presented to the emergency department  She had nausea and vomiting  She denies fever or chills  CT reveals obstructing 6 mm left mid ureteral calculus  Of note she has significant comorbidities including pseudotumor cerebri with  shunt  She has history of gastric sleeve placement with leak and reoperations due to complications  Past Medical, Past Surgical History:     Past Medical History:   Diagnosis Date    Abdominal pain     Brain condition     Pseudotumor Cerebri     Chronic kidney disease     De Quervain's disease (tenosynovitis)     Esophageal perforation     Gastric leak     GERD (gastroesophageal reflux disease)     Headache     Idiopathic intracranial hypertension     Kidney stone     Migraine     No blood products     per pt: personal and Moravian beliefs   surgeon office aware 12/13/19    Papilledema, both eyes     PONV (postoperative nausea and vomiting)     Postgastrectomy malabsorption     Presence of lumboperitoneal shunt     Resolved: Sep 20, 2017    Rotator cuff tendinitis     Resolved: Aug 23, 2017    Visual field defect    :    Past Surgical History:   Procedure Laterality Date    CSF SHUNT      LP shunt - 2015 -  shunt - 2017    ESOPHAGOGASTRODUODENOSCOPY N/A 2/23/2018    Procedure: ESOPHAGOGASTRODUODENOSCOPY (EGD) WITH ESOPHAGEAL STENT PLACEMENT;  Surgeon: Braden Naik MD;  [facility];  Service: Thoracic    ESOPHAGOGASTRODUODENOSCOPY N/A 2/23/2018    Procedure: ESOPHAGOGASTRODUODENOSCOPY (EGD) WITH REMOVAL ESOPHAGEAL STENT  AND REPLACEMENT WITH 23mm X155mm [address];  Surgeon: Braden Naik MD;  [facility];  Service: Thoracic    ESOPHAGOGASTRODUODENOSCOPY N/A 3/28/2018    Procedure: ESOPHAGOGASTRODUODENOSCOPY (EGD) with PEJ placement ;  Surgeon: Darling Suarez MD;  [facility]; Service: Gastroenterology    ESOPHAGOGASTRODUODENOSCOPY N/A 4/5/2018    Procedure: ESOPHAGOGASTRODUODENOSCOPY (EGD) with botox injection and kaofed placement;  Surgeon: Jt Weller DO;  [facility]; Service: Gastroenterology    ESOPHAGOGASTRODUODENOSCOPY N/A 4/10/2018    Procedure: ESOPHAGOGASTRODUODENOSCOPY (EGD) with Kaofed placement;  Surgeon: González Pappas MD;  [facility]; Service: Gastroenterology    ESOPHAGOSCOPY WITH STENT INSERTION N/A 1/24/2018    Procedure: INSERTION STENT ESOPHAGEAL;  Surgeon: Lane Gould MD;  [facility]; Service: Gastroenterology    EYE SURGERY Bilateral 1980    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)     FL RETROGRADE PYELOGRAM  6/24/2020    GASTRIC BYPASS  12/19/2016    Lap sleeve gastrectomy w/shunt length shortening procedure    HIATAL HERNIA REPAIR      HYSTERECTOMY  03/14/2013    IR LUMBAR PUNCTURE  8/29/2018    LAPAROTOMY N/A 1/25/2018    Procedure: Exploratory Laparotomy, wash out,placement of drains, placement of NG feeding tube ;   Surgeon: Geovany John DO;  [facility];  Service: General    LUMBAR PERITONEAL SHUNT  11/19/2015    Laparoscopic assisted    MO CREATE SHUNT:VENTRIC-ATRIAL Right 12/18/2019    Procedure: IMAGE GUIDED INSERTION OF RIGHT CORONAL VENTRICULAR-ATRIAL SHUNT; Surgeon: Erica Cole MD;  [facility];  Service: Neurosurgery    VT CREATE SHUNT:VENTRIC-PERITONEAL Right 5/31/2017    Procedure: IMAGE GUIDED CORONAL PLACEMENT OF PROGRAMABLE VENTRICULAR-PERITONEAL SHUNT, REMOVAL OF LP SHUNT ;  Surgeon: Erica Cole MD;  [facility];  Service: Neurosurgery    VT CYSTO/URETERO W/LITHOTRIPSY &INDWELL STENT INSRT Bilateral 6/24/2020    Procedure: CYSTOSCOPY URETEROSCOPY WITH LITHOTRIPSY HOLMIUM LASER, RETROGRADE PYELOGRAM AND exchange bilateral  STENTs URETERAL;  Surgeon: Ariel Marley MD;  [facility];  Service: Urology    VT CYSTOURETHROSCOPY,URETER CATHETER N/A 6/6/2020    Procedure: Bilateral CYSTOSCOPY RETROGRADE PYELOGRAM WITH INSERTION STENT URETERAL;  Surgeon: Satnam Ogden MD;  Location: [facility] MAIN OR;  Service: Urology    VT EGD TRANSORAL BIOPSY SINGLE/MULTIPLE N/A 6/27/2018    Procedure: ESOPHAGOGASTRODUODENOSCOPY (EGD) with padlock clip placement;  Surgeon: Ziggy Carlos MD;  [facility]; Service: Marcha Stammer CSF SHUNT,W/O REPLACE Right 2/5/2018    Procedure: Removal of  shunt;  Surgeon: Erica Cole MD;  [facility];  Service: Neurosurgery    VT REPLACEMENT/REVISION,CSF SHUNT Right 1/25/2018    Procedure: Externalization of right-sided SHUNT VENTRICULAR-PERITONEAL in anterior chest wall ribs two and three level  ;  Surgeon: Jean Serrato MD;  [facility];  Service: Neurosurgery    WRIST SURGERY Right     x3 2006, 2008   :     Medications, Allergies:     Current Facility-Administered Medications   Medication Dose Route Frequency    acetaminophen (TYLENOL) tablet 650 mg  650 mg Oral Q6H PRN    ceftriaxone (ROCEPHIN) 1 g/50 mL in dextrose IVPB  1,000 mg Intravenous Q24H    ketorolac (TORADOL) injection 30 mg  30 mg Intravenous Q6H PRN    lidocaine (LIDODERM) 5 % patch 1 patch  1 patch Topical Daily PRN    multi-electrolyte (PLASMALYTE-A/ISOLYTE-S PH 7 4) IV solution 125 mL/hr Intravenous Continuous    multivitamin-minerals (CENTRUM) tablet 1 tablet  1 tablet Oral Daily    ondansetron (ZOFRAN) injection 4 mg  4 mg Intravenous Q6H PRN    pantoprazole (PROTONIX) EC tablet 40 mg  40 mg Oral BID    phenazopyridine (PYRIDIUM) tablet 100 mg  100 mg Oral TID With Meals    tamsulosin (FLOMAX) capsule 0 4 mg  0 4 mg Oral Daily With Dinner       Allergies: Allergies   Allergen Reactions    Benadryl [Diphenhydramine] Anaphylaxis     Throat closing    Phenergan [Promethazine] Anaphylaxis   :    Social and Family History:   Social History:   Social History     Tobacco Use    Smoking status: Former Smoker     Packs/day: 0 50     Years: 20 00     Pack years: 10 00     Quit date: 2012     Years since quittin 9    Smokeless tobacco: Never Used   Vaping Use    Vaping Use: Every day    Substances: THC, CBD   Substance Use Topics    Alcohol use: Never    Drug use: Yes     Frequency: 7 0 times per week     Types: Marijuana     Comment: medical marijuana, vaping & topical         Social History     Tobacco Use   Smoking Status Former Smoker    Packs/day: 0 50    Years: 20 00    Pack years: 10 00    Quit date: 2012    Years since quittin 9   Smokeless Tobacco Never Used       Family History:  Family History   Problem Relation Age of Onset    No Known Problems Mother     No Known Problems Father     Thyroid cancer Brother     Colon cancer Maternal Grandfather     Cancer Paternal Grandmother     Cancer Paternal Grandfather     Cancer Family         Gastric    Leukemia Family     Colon cancer Family     Pancreatic cancer Family    :     Review of Systems:     General: Fever, chills, or night sweats: negative  Cardiac: Negative for chest pain  Pulmonary: Negative for shortness of breath  Gastrointestinal: Abdominal pain positive  Nausea, vomiting, or diarrhea positive,  Genitourinary: See HPI above  Patient does not have hematuria    All other systems queried were negative  Physical Exam:   General: Patient is pleasant and in NAD  Awake and alert  /51 (BP Location: Left arm)   Pulse 66   Temp 97 5 °F (36 4 °C) (Oral)   Resp 18   Wt 59 kg (130 lb)   LMP  (LMP Unknown)   SpO2 100%   BMI 23 03 kg/m² Temp (24hrs), Av 5 °F (36 4 °C), Min:97 5 °F (36 4 °C), Max:97 5 °F (36 4 °C)  current; Temperature: 97 5 °F (36 4 °C)  I/O last 24 hours: In: 100 [IV Piggyback:100]  Out: -   Head: Normocephalic, without obvious abnormality, atraumatic  Eyes: negative  Lungs: clear to auscultation bilaterally  Heart: regular rate and rhythm  Abdomen: soft, non-tender; bowel sounds normal; no masses,  no organomegaly  Extremities: extremities normal, warm and well-perfused; no cyanosis, clubbing, or edema  Neurologic: Grossly normal    :  Positive left CVA tenderness  Positive left lower quadrant tenderness  Labs:     Lab Results   Component Value Date    HGB 8 7 (L) 2021    HCT 30 4 (L) 2021    WBC 14 89 (H) 2021     2021   ]    Lab Results   Component Value Date     2018    K 3 8 2021     2021    CO2 28 2021    BUN 20 2021    CREATININE 0 65 2021    CALCIUM 8 7 2021    GLUCOSE 94 2018   ]    Imaging:   I personally reviewed the images and report of the following studies, and reviewed them with the patient:    CT Abdomen: Personally reviewed with mid left ureteral calculus and hydronephrosis  Reviewed with patient as well  ASSESSMENT:     54 y o  old female with  obstructing mid left ureteral calculus and hydronephrosis  PLAN:     Discussed interventions  Patient is against having a stent placed  I advised that she will likely require stent  Fortunately, she is nontoxic though she does have some leukocytosis which may be associated with inflammation  There is no bacteria seen on her urine analysis    I would be willing to attempt cystoscopy with ureteroscopy laser, stone extraction and stent  Alternatively if clinically indicated may require stent alone and second-stage ureteroscopy  She is very much opposed to this stone will certainly try to retrieve the calculus  All questions answered to her satisfaction and she wishes to proceed with intervention as soon as possible  Thank you for allowing me to participate in this patients care  Please do not hesitate to call with any additional questions    Luane Closs, MD

## 2021-08-21 NOTE — ED NOTES
Dr Clarita Bartlett aware of pt pain status, no orders from admitting MD Ryan Rose, RN  08/21/21 3544

## 2021-08-21 NOTE — ED NOTES
Dr Issac Benson from urology at bedside     [facility] [place], [address] [facility]  08/21/21 [id number]

## 2021-08-21 NOTE — ASSESSMENT & PLAN NOTE
10/10 pain --> 9/10 pain after stone removal and stent placement, stent will be removed on Tuesday at outpatient urology visit     Discharge with tylenol    Tylenol changed to Q6H scheduled overnight due to severe pain   Prn Dilaudid, Toradol and lidocaine patch for pain control    Urology following, appreciate recommendations:   -Currently on pyridium, will trial increased dose

## 2021-08-21 NOTE — ED NOTES
Pt requesting pain meds, refusing toradol and tylenol, admitting aware via tiger text     Fredis Waters RN  08/21/21 8410

## 2021-08-21 NOTE — ANESTHESIA PREPROCEDURE EVALUATION
Procedure:  CYSTOSCOPY URETEROSCOPY WITH LITHOTRIPSY HOLMIUM LASER, RETROGRADE PYELOGRAM AND INSERTION STENT URETERAL (Left Bladder)    Relevant Problems   CARDIO   (+) Hypercholesteremia   (+) Left-sided chest wall pain   (+) Murmur, cardiac      GI/HEPATIC   (+) Bariatric surgery status   (+) Gastroesophageal reflux disease      /RENAL   (+) Hydroureteronephrosis   (+) Renal calculi      NEURO/PSYCH   (+) Chronic tension-type headache, intractable   (+) Depression   (+) History of idiopathic intracranial hypertension   (+) Positional headache             Anesthesia Plan  ASA Score- 2     Anesthesia Type- general with ASA Monitors  Additional Monitors:   Airway Plan: LMA  Plan Factors-    Chart reviewed  Induction- intravenous  Postoperative Plan-     Informed Consent- Anesthetic plan and risks discussed with patient  I personally reviewed this patient with the CRNA  Discussed and agreed on the Anesthesia Plan with the CRNA  Sonam Forte

## 2021-08-21 NOTE — OP NOTE
OPERATIVE REPORT  PATIENT NAME: Effie Villalobos    :  1975  MRN: [id number]  Pt Location:  CYSTO ROOM 01    SURGERY DATE: 2021    Surgeon(s) and Role:     Dileep Negrete MD - Primary    Preop Diagnosis:  Renal calculi [N20 0]    Post-Op Diagnosis Codes:     * Renal calculi [N20 0]    Procedure(s) (LRB):  CYSTOSCOPY; LEFT URETEROSCOPY WITH RETROGRADE PYELOGRAM, REMOVAL OF STONE AND INSERTION LEFT URETERAL STENT (Left)    Specimen(s):  ID Type Source Tests Collected by Time Destination   A : Left ureteral calculus Calculus Ureter, Left STONE ANALYSIS Curry Saldana MD 2021 1149        Estimated Blood Loss:   Minimal    Drains:  * No LDAs found *    Anesthesia Type:   General    Operative Indications:  Renal calculi [N20 0]      Operative Findings:  Left proximal ureteral calculus, extracted  Complications:   None    Procedure and Technique:  The patient was identified, brought to the operating room, and placed on the table in supine position  After induction of general anesthesia, the patient was placed in dorsal lithotomy position and prepped and draped in the usual sterile fashion  A complete formal timeout was performed  The 25 Lithuanian rigid cystoscope was placed per urethra and cystoscopy was performed  There was no bladder abnormality identified  The Left ureteral orifice was identified and cannulated with a Solo wire  Stone was palpated with wire and visualized on x-ray in the proximal ureter as expected  A semirigid ureteroscope was then placed alongside the wire into the ureter, and the stone was identified  A 4 wire stone basket was placed and the stone was retrieved  At this point, a retrograde pyelogram was performed delineating the upper urinary tract anatomy  No further filling defect identified  The ureteral length was measured  The safety wire was backloaded into the cystoscope and a 26 cm x 6 Lithuanian double-J stent was placed with string        The string was tucked into the vagina  The patient tolerated the procedure well and was transferred to the recovery room awake alert and in stable condition  Plan:  Patient may be discharged home if stable today  Stent/string until Tuesday 8/24  Office will call to arrange       I was present for the entire procedure    Patient Disposition:  PACU     SIGNATURE: Charlie Morton MD  DATE: August 21, 2021  TIME: 12:01 PM

## 2021-08-21 NOTE — ASSESSMENT & PLAN NOTE
Patient has history of recurrent stones, previous stone analysis demonstrated calcium oxalate  Family history of stones in mother  Patient is afebrile hemodynamically stable and house left-sided costo vertebral tenderness    CT demonstrates 4 x 3 x 6 mm stone in the left mid ureter  UA negative for nitrites or bacteria, positive and innumerable RBCs  S/p left ureteroscopy with retrograde pyelogram, removal of stone and insertion of left ureteral stent with no complications  Discontinued IV ceftriaxone, discharge on 5-day course of Keflex    WBC 14 89 --> 8 4 8/22/21, WNL  IV fluids isolate 125 cc  Urology following, appreciate recommendations  Currently on pyridium, will trial increased dose        Zofran prn changed to Q4H scheduled due to Nausea and vomiting  Tylenol changed to Q6H scheduled due to severe pain   Prn Dilaudid, Toradol and lidocaine patch for pain control

## 2021-08-21 NOTE — PLAN OF CARE
Problem: MOBILITY - ADULT  Goal: Maintain or return to baseline ADL function  Description: INTERVENTIONS:  -  Assess patient's ability to carry out ADLs; assess patient's baseline for ADL function and identify physical deficits which impact ability to perform ADLs (bathing, care of mouth/teeth, toileting, grooming, dressing, etc )  - Assess/evaluate cause of self-care deficits   - Assess range of motion  - Assess patient's mobility; develop plan if impaired  - Assess patient's need for assistive devices and provide as appropriate  - Encourage maximum independence but intervene and supervise when necessary  - Involve family in performance of ADLs  - Assess for home care needs following discharge   - Consider OT consult to assist with ADL evaluation and planning for discharge  - Provide patient education as appropriate  Outcome: Progressing  Goal: Maintains/Returns to pre admission functional level  Description: INTERVENTIONS:  - Perform BMAT or MOVE assessment daily    - Set and communicate daily mobility goal to care team and patient/family/caregiver  - Collaborate with rehabilitation services on mobility goals if consulted  - Perform Range of Motion  times a day  - Reposition patient every  hours    - Dangle patient  times a day  - Stand patient  times a day  - Ambulate patient times a day  - Out of bed to chair times a day   - Out of bed for meals times a day  - Out of bed for toileting  - Record patient progress and toleration of activity level   Outcome: Progressing     Problem: PAIN - ADULT  Goal: Verbalizes/displays adequate comfort level or baseline comfort level  Description: Interventions:  - Encourage patient to monitor pain and request assistance  - Assess pain using appropriate pain scale  - Administer analgesics based on type and severity of pain and evaluate response  - Implement non-pharmacological measures as appropriate and evaluate response  - Consider cultural and social influences on pain and pain management  - Notify physician/advanced practitioner if interventions unsuccessful or patient reports new pain  Outcome: Progressing     Problem: INFECTION - ADULT  Goal: Absence or prevention of progression during hospitalization  Description: INTERVENTIONS:  - Assess and monitor for signs and symptoms of infection  - Monitor lab/diagnostic results  - Monitor all insertion sites, i e  indwelling lines, tubes, and drains  - Monitor endotracheal if appropriate and nasal secretions for changes in amount and color  - [place] appropriate cooling/warming therapies per order  - Administer medications as ordered  - Instruct and encourage patient and family to use good hand hygiene technique  - Identify and instruct in appropriate isolation precautions for identified infection/condition  Outcome: Progressing  Goal: Absence of fever/infection during neutropenic period  Description: INTERVENTIONS:  - Monitor WBC    Outcome: Progressing     Problem: SAFETY ADULT  Goal: Maintain or return to baseline ADL function  Description: INTERVENTIONS:  -  Assess patient's ability to carry out ADLs; assess patient's baseline for ADL function and identify physical deficits which impact ability to perform ADLs (bathing, care of mouth/teeth, toileting, grooming, dressing, etc )  - Assess/evaluate cause of self-care deficits   - Assess range of motion  - Assess patient's mobility; develop plan if impaired  - Assess patient's need for assistive devices and provide as appropriate  - Encourage maximum independence but intervene and supervise when necessary  - Involve family in performance of ADLs  - Assess for home care needs following discharge   - Consider OT consult to assist with ADL evaluation and planning for discharge  - Provide patient education as appropriate  Outcome: Progressing  Goal: Maintains/Returns to pre admission functional level  Description: INTERVENTIONS:  - Perform BMAT or MOVE assessment daily    - Set and communicate daily mobility goal to care team and patient/family/caregiver  - Collaborate with rehabilitation services on mobility goals if consulted  - Perform Range of Motion  times a day  - Reposition patient every hours    - Dangle patient  times a day  - Stand patient  times a day  - Ambulate patient  times a day  - Out of bed to chair  times a day   - Out of bed for meals  times a day  - Out of bed for toileting  - Record patient progress and toleration of activity level   Outcome: Progressing  Goal: Patient will remain free of falls  Description: INTERVENTIONS:  - Educate patient/family on patient safety including physical limitations  - Instruct patient to call for assistance with activity   - Consult OT/PT to assist with strengthening/mobility   - Keep Call bell within reach  - Keep bed low and locked with side rails adjusted as appropriate  - Keep care items and personal belongings within reach  - Initiate and maintain comfort rounds  - Make Fall Risk Sign visible to staff  - Offer Toileting every Hours, in advance of need  - Initiate/Maintain alarm  - Obtain necessary fall risk management equipment:   - Apply yellow socks and bracelet for high fall risk patients  - Consider moving patient to room near nurses station  Outcome: Progressing     Problem: DISCHARGE PLANNING  Goal: Discharge to home or other facility with appropriate resources  Description: INTERVENTIONS:  - Identify barriers to discharge w/patient and caregiver  - Arrange for needed discharge resources and transportation as appropriate  - Identify discharge learning needs (meds, wound care, etc )  - Arrange for interpretive services to assist at discharge as needed  - Refer to Case Management Department for coordinating discharge planning if the patient needs post-hospital services based on physician/advanced practitioner order or complex needs related to functional status, cognitive ability, or social support system  Outcome: Progressing Problem: Knowledge Deficit  Goal: Patient/family/caregiver demonstrates understanding of disease process, treatment plan, medications, and discharge instructions  Description: Complete learning assessment and assess knowledge base    Interventions:  - Provide teaching at level of understanding  - Provide teaching via preferred learning methods  Outcome: Progressing

## 2021-08-21 NOTE — ED PROVIDER NOTES
History  Chief Complaint   Patient presents with    Flank Pain     per ems, pt c/o left sided flank pain and n/v, hx kidney stones  55 y/o female with history of recurrent ureterolithiasis and prior stent placement presents to the ER for evaluation of left flank pain, nausea, and vomiting that started earlier today  Patient reports that symptoms are similar to prior ureterolithiasis  Her pain is waxing and waning  She rubs the ER via EMS and received Toradol 30 mg IV, fentanyl 100 mcg prior to arrival without significant relief  Prior to Admission Medications   Prescriptions Last Dose Informant Patient Reported? Taking?    Galcanezumab-gnlm 120 MG/ML SOAJ Not Taking at Unknown time  No No   Sig: Inject 1 pen under the skin every 30 (thirty) days   Patient not taking: Reported on 8/21/2021   Multiple Vitamin (MULTIVITAMIN) tablet 8/20/2021 at Unknown time Self Yes Yes   Sig: Take 1 tablet by mouth daily Wears a patch   NON FORMULARY 8/20/2021 at Unknown time Self Yes Yes   calcium citrate-vitamin D (CITRACAL+D) 315-200 MG-UNIT per tablet 8/20/2021 at Unknown time Self Yes Yes   Sig: Take 1 tablet by mouth 2 (two) times a day   dexlansoprazole (Dexilant) 60 MG capsule   No No   Sig: Take 1 capsule (60 mg total) by mouth daily   indomethacin (INDOCIN) 25 mg capsule Not Taking at Unknown time  No No   Sig: Take 1 capsule (25 mg total) by mouth 3 (three) times a day with meals   Patient not taking: Reported on 8/21/2021   pantoprazole (PROTONIX) 40 mg tablet 8/20/2021 at Unknown time Self No Yes   Sig: Take 1 tablet (40 mg total) by mouth 2 (two) times a day   sucralfate (CARAFATE) 1 g tablet Not Taking at Unknown time  No No   Sig: Take 1 tablet (1 g total) by mouth 3 (three) times a day as needed (headache-take before indomethacin)   Patient not taking: Reported on 8/21/2021      Facility-Administered Medications: None       Past Medical History:   Diagnosis Date    Abdominal pain     Brain condition     Pseudotumor Cerebri     Chronic kidney disease     De Quervain's disease (tenosynovitis)     Esophageal perforation     Gastric leak     GERD (gastroesophageal reflux disease)     Headache     Idiopathic intracranial hypertension     Kidney stone     Migraine     No blood products     per pt: personal and Hindu beliefs  surgeon office aware 12/13/19    Papilledema, both eyes     PONV (postoperative nausea and vomiting)     Postgastrectomy malabsorption     Presence of lumboperitoneal shunt     Resolved: Sep 20, 2017    Rotator cuff tendinitis     Resolved: Aug 23, 2017    Visual field defect        Past Surgical History:   Procedure Laterality Date    CSF SHUNT      LP shunt - 2015 -  shunt - 2017    ESOPHAGOGASTRODUODENOSCOPY N/A 2/23/2018    Procedure: ESOPHAGOGASTRODUODENOSCOPY (EGD) WITH ESOPHAGEAL STENT PLACEMENT;  Surgeon: Pan Rea MD;  [facility];  Service: Thoracic    ESOPHAGOGASTRODUODENOSCOPY N/A 2/23/2018    Procedure: ESOPHAGOGASTRODUODENOSCOPY (EGD) WITH REMOVAL ESOPHAGEAL STENT  AND REPLACEMENT WITH 23mm X155mm [address];  Surgeon: Pan Rea MD;  [facility];  Service: Thoracic    ESOPHAGOGASTRODUODENOSCOPY N/A 3/28/2018    Procedure: ESOPHAGOGASTRODUODENOSCOPY (EGD) with PEJ placement ;  Surgeon: Isidro Noriega MD;  [facility]; Service: Gastroenterology    ESOPHAGOGASTRODUODENOSCOPY N/A 4/5/2018    Procedure: ESOPHAGOGASTRODUODENOSCOPY (EGD) with botox injection and kaofed placement;  Surgeon: Glenroy Yanes DO;  [facility]; Service: Gastroenterology    ESOPHAGOGASTRODUODENOSCOPY N/A 4/10/2018    Procedure: ESOPHAGOGASTRODUODENOSCOPY (EGD) with Kaofed placement;  Surgeon: Esthela Dunn MD;  [facility];   Service: Gastroenterology    ESOPHAGOSCOPY WITH STENT INSERTION N/A 1/24/2018    Procedure: INSERTION STENT ESOPHAGEAL;  Surgeon: Beatris Tomlin MD;  [facility]; Service: Gastroenterology    EYE SURGERY Bilateral 1980    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)     FL RETROGRADE PYELOGRAM  6/24/2020    FL RETROGRADE PYELOGRAM  8/21/2021    GASTRIC BYPASS  12/19/2016    Lap sleeve gastrectomy w/shunt length shortening procedure    HIATAL HERNIA REPAIR      HYSTERECTOMY  03/14/2013    IR LUMBAR PUNCTURE  8/29/2018    LAPAROTOMY N/A 1/25/2018    Procedure: Exploratory Laparotomy, wash out,placement of drains, placement of NG feeding tube ;   Surgeon: Deanna Granados DO;  [facility];  Service: General    LUMBAR PERITONEAL SHUNT  11/19/2015    Laparoscopic assisted    CA CREATE SHUNT:VENTRIC-ATRIAL Right 12/18/2019    Procedure: IMAGE GUIDED INSERTION OF RIGHT CORONAL VENTRICULAR-ATRIAL SHUNT;  Surgeon: Erica Cole MD;  [facility];  Service: Neurosurgery    CA CREATE SHUNT:VENTRIC-PERITONEAL Right 5/31/2017    Procedure: IMAGE GUIDED CORONAL PLACEMENT OF PROGRAMABLE VENTRICULAR-PERITONEAL SHUNT, REMOVAL OF LP SHUNT ;  Surgeon: Erica Cole MD;  [facility];  Service: Neurosurgery    CA CYSTO/URETERO W/LITHOTRIPSY &INDWELL STENT INSRT Bilateral 6/24/2020    Procedure: CYSTOSCOPY URETEROSCOPY WITH LITHOTRIPSY HOLMIUM LASER, RETROGRADE PYELOGRAM AND exchange bilateral  STENTs URETERAL;  Surgeon: Ariel Marley MD;  [facility];  Service: Urology    CA CYSTO/URETERO W/LITHOTRIPSY &INDWELL STENT INSRT Left 8/21/2021    Procedure: CYSTOSCOPY; LEFT URETEROSCOPY WITH RETROGRADE PYELOGRAM, REMOVAL OF STONE AND INSERTION LEFT URETERAL STENT;  Surgeon: Isaac Arreaga MD;  [facility];  Service: Urology    CA CYSTOURETHROSCOPY,URETER CATHETER N/A 6/6/2020    Procedure: Bilateral CYSTOSCOPY RETROGRADE PYELOGRAM WITH INSERTION STENT URETERAL;  Surgeon: Satnam Ogden MD;  Location: [facility];  Service: Urology    CA EGD TRANSORAL BIOPSY SINGLE/MULTIPLE N/A 6/27/2018    Procedure: ESOPHAGOGASTRODUODENOSCOPY (EGD) with padlock clip placement;  Surgeon: Mark Jaimes MD;  [facility]; Service: Patience Honorio CSF SHUNT,W/O REPLACE Right 2018    Procedure: Removal of  shunt;  Surgeon: Eather Curling, MD;  [facility];  Service: Neurosurgery    TN REPLACEMENT/REVISION,CSF SHUNT Right 2018    Procedure: Externalization of right-sided SHUNT VENTRICULAR-PERITONEAL in anterior chest wall ribs two and three level  ;  Surgeon: Mars Valdez MD;  [facility];  Service: Neurosurgery    WRIST SURGERY Right     x3 2006,        Family History   Problem Relation Age of Onset    No Known Problems Mother     No Known Problems Father     Thyroid cancer Brother     Colon cancer Maternal Grandfather     Cancer Paternal Grandmother     Cancer Paternal Grandfather     Cancer Family         Gastric    Leukemia Family     Colon cancer Family     Pancreatic cancer Family      I have reviewed and agree with the history as documented  E-Cigarette/Vaping    E-Cigarette Use Current Every Day User      E-Cigarette/Vaping Substances    Nicotine No     THC Yes     CBD Yes     Flavoring No      Social History     Tobacco Use    Smoking status: Former Smoker     Packs/day: 0 50     Years: 20 00     Pack years: 10 00     Quit date: 2012     Years since quittin 0    Smokeless tobacco: Never Used   Vaping Use    Vaping Use: Every day    Substances: THC, CBD   Substance Use Topics    Alcohol use: Never    Drug use: Yes     Frequency: 7 0 times per week     Types: Marijuana     Comment: medical marijuana, vaping & topical         Review of Systems   Constitutional: Negative for chills and fever  HENT: Negative for congestion, rhinorrhea and sore throat  Respiratory: Negative for cough and shortness of breath  Cardiovascular: Negative for chest pain and palpitations  Gastrointestinal: Positive for abdominal pain, nausea and vomiting  Negative for diarrhea  Genitourinary: Positive for dysuria and flank pain  Negative for hematuria, vaginal bleeding and vaginal discharge  Musculoskeletal: Negative for back pain and neck pain  Neurological: Negative for dizziness, weakness, light-headedness, numbness and headaches  All other systems reviewed and are negative  Physical Exam  ED Triage Vitals [08/21/21 0106]   Temperature Pulse Respirations Blood Pressure SpO2   97 5 °F (36 4 °C) (!) 52 20 112/51 98 %      Temp Source Heart Rate Source Patient Position - Orthostatic VS BP Location FiO2 (%)   Oral Monitor Sitting Left arm --      Pain Score       6             Orthostatic Vital Signs  Vitals:    08/22/21 1441 08/22/21 1918 08/22/21 2245 08/23/21 0639   BP: 122/77 124/68 124/70 113/62   Pulse: 61 70 66 60   Patient Position - Orthostatic VS:    Lying       Physical Exam  Vitals and nursing note reviewed  Constitutional:       General: She is in acute distress  Appearance: Normal appearance  She is normal weight  She is not ill-appearing  Comments: Appears uncomfortable and in mild distress secondary to symptoms  HENT:      Head: Normocephalic and atraumatic  Right Ear: External ear normal       Left Ear: External ear normal       Nose: Nose normal  No congestion or rhinorrhea  Mouth/Throat:      Mouth: Mucous membranes are moist       Pharynx: Oropharynx is clear  No oropharyngeal exudate or posterior oropharyngeal erythema  Eyes:      Extraocular Movements: Extraocular movements intact  Conjunctiva/sclera: Conjunctivae normal       Pupils: Pupils are equal, round, and reactive to light  Cardiovascular:      Rate and Rhythm: Normal rate and regular rhythm  Pulses: Normal pulses  Heart sounds: Normal heart sounds  No murmur heard  Pulmonary:      Effort: Pulmonary effort is normal  No respiratory distress  Breath sounds: Normal breath sounds  No wheezing or rales  Abdominal:      General: Abdomen is flat  Bowel sounds are normal  There is no distension  Palpations: Abdomen is soft  Tenderness: There is abdominal tenderness (LLQ)  There is left CVA tenderness  There is no right CVA tenderness or guarding  Musculoskeletal:         General: No swelling or tenderness  Normal range of motion  Cervical back: Normal range of motion and neck supple  No tenderness  Skin:     General: Skin is warm and dry  Capillary Refill: Capillary refill takes less than 2 seconds  Neurological:      General: No focal deficit present  Mental Status: She is alert and oriented to person, place, and time           ED Medications  Medications   ketorolac (FOR EMS ONLY) (TORADOL) injection 30 mg (0 mg Does not apply Given to EMS 8/21/21 0128)   ondansetron (FOR EMS ONLY) (ZOFRAN) 4 mg/2 mL injection 4 mg (0 mg Does not apply Given to EMS 8/21/21 0128)   fentanyl citrate (PF) (FOR EMS ONLY) 100 mcg/2 mL injection 100 mcg (0 mcg Does not apply Given to EMS 8/21/21 0129)   HYDROmorphone (DILAUDID) injection 1 mg (1 mg Intravenous Given 8/21/21 0138)   lidocaine (PF) (XYLOCAINE-MPF) 1 % 89 mg in dextrose 5 % 50 mL IVPB (0 mg/kg × 59 kg Intravenous Stopped 8/21/21 0417)   HYDROmorphone (DILAUDID) injection 1 mg (1 mg Intravenous Given 8/21/21 0255)   ondansetron (ZOFRAN) injection 4 mg (4 mg Intravenous Given 8/21/21 0553)   HYDROmorphone (DILAUDID) injection 1 mg (1 mg Intravenous Given 8/21/21 0554)   trimethobenzamide (TIGAN) IM injection 200 mg (200 mg Intramuscular Given 8/21/21 2105)   HYDROmorphone (DILAUDID) injection 0 5 mg (0 5 mg Intravenous Given 8/21/21 2144)   iron sucrose (VENOFER) 300 mg in sodium chloride 0 9 % 250 mL IVPB (0 mg Intravenous Stopped 8/23/21 1207)       Diagnostic Studies  Results Reviewed     Procedure Component Value Units Date/Time    Urine culture [[id number]]  (Abnormal) Collected: 08/21/21 0557    Lab Status: Final result Specimen: Urine, Clean Catch Updated: 08/22/21 1111     Urine Culture <10,000 cfu/ml Beta Hemolytic Streptococcus Group B      <10,000 cfu/ml     CBC and differential [[id number]]  (Abnormal) Collected: 08/22/21 0527    Lab Status: Final result Specimen: Blood from Arm, Right Updated: 08/22/21 0904     WBC 8 40 Thousand/uL      RBC 3 90 Million/uL      Hemoglobin 8 6 g/dL      Hematocrit 30 0 %      MCV 77 fL      MCH 22 1 pg      MCHC 28 7 g/dL      RDW 21 8 %      MPV 10 2 fL      Platelets 844 Thousands/uL      nRBC 0 /100 WBCs      Neutrophils Relative 78 %      Immat GRANS % 1 %      Lymphocytes Relative 12 %      Monocytes Relative 9 %      Eosinophils Relative 0 %      Basophils Relative 0 %      Neutrophils Absolute 6 55 Thousands/µL      Immature Grans Absolute 0 04 Thousand/uL      Lymphocytes Absolute 1 03 Thousands/µL      Monocytes Absolute 0 73 Thousand/µL      Eosinophils Absolute 0 02 Thousand/µL      Basophils Absolute 0 03 Thousands/µL     Basic metabolic panel [[id number]] Collected: 08/22/21 0527    Lab Status: Final result Specimen: Blood from Arm, Right Updated: 08/22/21 0631     Sodium 138 mmol/L      Potassium 4 2 mmol/L      Chloride 105 mmol/L      CO2 28 mmol/L      ANION GAP 5 mmol/L      BUN 17 mg/dL      Creatinine 0 60 mg/dL      Glucose 107 mg/dL      Calcium 8 7 mg/dL      eGFR 110 ml/min/1 73sq m     Narrative:      [place] guidelines for Chronic Kidney Disease (CKD):     Stage 1 with normal or high GFR (GFR > 90 mL/min/1 73 square meters)    Stage 2 Mild CKD (GFR = 60-89 mL/min/1 73 square meters)    Stage 3A Moderate CKD (GFR = 45-59 mL/min/1 73 square meters)    Stage 3B Moderate CKD (GFR = 30-44 mL/min/1 73 square meters)    Stage 4 Severe CKD (GFR = 15-29 mL/min/1 73 square meters)    Stage 5 End Stage CKD (GFR <15 mL/min/1 73 square meters)  Note: GFR calculation is accurate only with a steady state creatinine    POCT pregnancy, urine [[id number]]  (Normal) Resulted: 08/21/21 0804    Lab Status: Final result Updated: 08/21/21 0804     EXT PREG TEST UR (Ref: Negative) Negative     Control valid    Urine Microscopic [[id number]]  (Abnormal) Collected: 08/21/21 0557    Lab Status: Final result Specimen: Urine, Clean Catch Updated: 08/21/21 0719     RBC, UA Innumerable /hpf      WBC, UA 30-50 /hpf      Epithelial Cells None Seen /hpf      Bacteria, UA None Seen /hpf      Hyaline Casts, UA 25-50 /lpf     UA w Reflex to Microscopic w Reflex to Culture [[id number]]  (Abnormal) Collected: 08/21/21 0557    Lab Status: Final result Specimen: Urine, Clean Catch Updated: 08/21/21 0717     Color, UA Dk Yellow     Clarity, UA Cloudy     Specific Gravity, UA 1 024     pH, UA 6 0     Leukocytes, UA Small     Nitrite, UA Negative     Protein, UA 30 (1+) mg/dl      Glucose, UA Negative mg/dl      Ketones, UA Trace mg/dl      Urobilinogen, UA 1 0 E U /dl      Bilirubin, UA Negative     Blood, UA Large    Comprehensive metabolic panel [[id number]]  (Abnormal) Collected: 08/21/21 0138    Lab Status: Final result Specimen: Blood from Arm, Right Updated: 08/21/21 0239     Sodium 139 mmol/L      Potassium 3 8 mmol/L      Chloride 105 mmol/L      CO2 28 mmol/L      ANION GAP 6 mmol/L      BUN 20 mg/dL      Creatinine 0 65 mg/dL      Glucose 104 mg/dL      Calcium 8 7 mg/dL      Corrected Calcium 9 2 mg/dL      AST 11 U/L      ALT 16 U/L      Alkaline Phosphatase 58 U/L      Total Protein 6 6 g/dL      Albumin 3 4 g/dL      Total Bilirubin 0 22 mg/dL      eGFR 107 ml/min/1 73sq m     Narrative:      [place] guidelines for Chronic Kidney Disease (CKD):     Stage 1 with normal or high GFR (GFR > 90 mL/min/1 73 square meters)    Stage 2 Mild CKD (GFR = 60-89 mL/min/1 73 square meters)    Stage 3A Moderate CKD (GFR = 45-59 mL/min/1 73 square meters)    Stage 3B Moderate CKD (GFR = 30-44 mL/min/1 73 square meters)    Stage 4 Severe CKD (GFR = 15-29 mL/min/1 73 square meters)    Stage 5 End Stage CKD (GFR <15 mL/min/1 73 square meters)  Note: GFR calculation is accurate only with a steady state creatinine    Lipase [[id number]]  (Normal) Collected: 08/21/21 0138    Lab Status: Final result Specimen: Blood from Arm, Right Updated: 08/21/21 0229     Lipase 114 u/L     CBC and differential [[id number]]  (Abnormal) Collected: 08/21/21 0138    Lab Status: Final result Specimen: Blood from Arm, Right Updated: 08/21/21 0206     WBC 14 89 Thousand/uL      RBC 3 91 Million/uL      Hemoglobin 8 7 g/dL      Hematocrit 30 4 %      MCV 78 fL      MCH 22 3 pg      MCHC 28 6 g/dL      RDW 21 6 %      MPV 9 8 fL      Platelets 589 Thousands/uL      nRBC 0 /100 WBCs      Neutrophils Relative 80 %      Immat GRANS % 1 %      Lymphocytes Relative 10 %      Monocytes Relative 6 %      Eosinophils Relative 2 %      Basophils Relative 1 %      Neutrophils Absolute 11 98 Thousands/µL      Immature Grans Absolute 0 09 Thousand/uL      Lymphocytes Absolute 1 53 Thousands/µL      Monocytes Absolute 0 90 Thousand/µL      Eosinophils Absolute 0 30 Thousand/µL      Basophils Absolute 0 09 Thousands/µL                  FL retrograde pyelogram   Final Result by Ever Orourke MD (08/27 1314)      Fluoroscopic guidance provided for left retrograde pyelogram and left ureteral stent placement  Please see procedure report for further details  Workstation performed: [id number]         CT renal stone study abdomen pelvis without contrast   ED Interpretation by Tong Leiva DO (08/21 0228)   4mm stone L mid ureter, mild hydroureter      Final Result by Shamika Talavera MD (08/21 5924)      Bilateral nephrolithiasis with 4 x 3 x 6 mm calculus in the left mid ureter with associated moderate left hydronephrosis  Workstation performed: [id number]               Procedures  Procedures      ED Course  ED Course as of Aug 30 0529   Sat Aug 21, 2021   0636 One time BP of 89/50 noted at 4:45 a m   Subsequent repeat blood pressure measurement shows BP in the 95I systolic, with normal mentation  Most recent /54  [facility]  Number of Diagnoses or Management Options  Hydronephrosis  Intractable pain  Left ureteral stone  Diagnosis management comments: 53 y/o female with history of recurrent ureterolithiasis and prior stent placement presents to the ER for evaluation of left flank pain, nausea, and vomiting that started earlier today  Patient reports that symptoms are similar to prior ureterolithiasis  Her pain is waxing and waning  She rubs the ER via EMS and received Toradol 30 mg IV, fentanyl 100 mcg prior to arrival without significant relief  Afebrile, VSS, appears uncomfortable and in mild distress due to symptoms  Heart regular rate rhythm, lungs clear to auscultation bilaterally, mild left lower quadrant tenderness to palpation and left CVA tenderness noted  Otherwise abdomen is soft, nondistended, normal bowel sounds  Will check labs, UA, and CT A/P   CT scan shows approximately 4 mm stone in the left mid ureter with mild hydroureter  Patient given Dilaudid with minimal relief of pain  IV lidocaine ordered, noted improvement of pain, however patient's pain recurred and required multiple doses of IV Dilaudid with only mild improvement  Plan to admit for Urology consult and pain control  Case discussed with Dr Mona Christina admitting resident, who accepts the patient for admission        Disposition  Final diagnoses:   Left ureteral stone   Hydronephrosis   Intractable pain     Time reflects when diagnosis was documented in both MDM as applicable and the Disposition within this note     Time User Action Codes Description Comment    8/21/2021  7:20 AM Arlyce Hammans Add [N20 1] Left ureteral stone     8/21/2021  7:20 AM Arlyce Hammans Add [N13 30] Hydronephrosis     8/21/2021  7:20 AM Arlyce Hammans Add [R52] Intractable pain     8/21/2021  9:16 AM Beatriz [place] Add [N20 0] Nephrolithiasis     8/21/2021  9:37 AM Jadon Dollar Add [N20 0] Renal calculi     8/21/2021 11:49 AM Antoinette Garcia Modify [N20 0] Renal calculi     8/21/2021  9:26 PM Maia Browne Modify [N20 0] Renal calculi     8/23/2021 12:21 PM Chivo Standing Add [R11 0] Nausea       ED Disposition     ED Disposition Condition Date/Time Comment    Admit Stable Sat Aug 21, 2021  7:22 AM Case was discussed with Dr Norberto Chau, 54 [facility] admitting resident, and the patient's admission status was agreed to be Admission Status: observation status to the service of Dr Rama Christensen          Follow-up Information     Follow up With Specialties Details Why Contact Info Additional [address] [facility] Follow up on 8/24/2021 Our office will reach out to you to schedule appt for stent removal on tuesday [address] [facility]  [address], [address] [place] [address], Internal Medicine Schedule an appointment as soon as possible for a visit in 1 week(s)  [address] [place] [address]  [phone]             Discharge Medication List as of 8/23/2021 12:34 PM      START taking these medications    Details   acetaminophen (TYLENOL) 325 mg tablet Take 2 tablets (650 mg total) by mouth every 6 (six) hours as needed for mild pain, Starting Mon 8/23/2021, Normal      ondansetron (ZOFRAN) 4 mg tablet Take 1 tablet (4 mg total) by mouth every 8 (eight) hours as needed for nausea or vomiting, Starting Mon 8/23/2021, Normal      phenazopyridine (PYRIDIUM) 200 mg tablet Take 1 tablet (200 mg total) by mouth 3 (three) times a day with meals, Starting Mon 8/23/2021, Normal         CONTINUE these medications which have NOT CHANGED    Details   calcium citrate-vitamin D (CITRACAL+D) 315-200 MG-UNIT per tablet Take 1 tablet by mouth 2 (two) times a day, Historical Med      Multiple Vitamin (MULTIVITAMIN) tablet Take 1 tablet by mouth daily Wears a patch, Historical Med      NON FORMULARY Historical Med      pantoprazole (PROTONIX) 40 mg tablet Take 1 tablet (40 mg total) by mouth 2 (two) times a day, Starting Wed 7/21/2021, Until Tue 10/19/2021, Normal         STOP taking these medications       dexlansoprazole (Dexilant) 60 MG capsule Comments:   Reason for Stopping:         Galcanezumab-gnlm 120 MG/ML SOAJ Comments:   Reason for Stopping:         indomethacin (INDOCIN) 25 mg capsule Comments:   Reason for Stopping:         sucralfate (CARAFATE) 1 g tablet Comments:   Reason for Stopping:             Outpatient Discharge Orders   Activity as tolerated     Call provider for:  persistent nausea or vomiting     Call provider for:  severe uncontrolled pain       PDMP Review       Value Time User    PDMP Reviewed  Yes 6/24/2020  3:21 PM Jose Ramon Paz MD           ED Provider  Attending physically available and evaluated Juan Byrnes  CANDE managed the patient along with the ED Attending      Electronically Signed by         Gregoria Rogers MD  08/30/21 9472

## 2021-08-21 NOTE — ANESTHESIA POSTPROCEDURE EVALUATION
Post-Op Assessment Note    CV Status:  Stable    Pain management: adequate     Mental Status:  Awake and sleepy (responds to name)   Hydration Status:  Euvolemic   PONV Controlled:  Controlled   Airway Patency:  Patent      Post Op Vitals Reviewed: Yes      Staff: Anesthesiologist, CRNA         No complications documented      /53 (08/21/21 1213)    Temp      Pulse 77 (08/21/21 1213)   Resp 16 (08/21/21 1213)    SpO2 100 % (08/21/21 1213)

## 2021-08-21 NOTE — ED ATTENDING ATTESTATION
8/21/2021  Dariela Link DO, saw and evaluated the patient  I have discussed the patient with the resident/non-physician practitioner and agree with the resident's/non-physician practitioner's findings, Plan of Care, and MDM as documented in the resident's/non-physician practitioner's note, except where noted  All available labs and Radiology studies were reviewed  I was present for key portions of any procedure(s) performed by the resident/non-physician practitioner and I was immediately available to provide assistance  At this point I agree with the current assessment done in the Emergency Department  I have conducted an independent evaluation of this patient a history and physical is as follows:    55 yo female w/hx recurrent ureterolithiasis requiring intervention x multiple  Presents today for symptoms c/w prior ureterolithiasis  C/o severe L flank pain, n/v  Acute onset earlier today  Constant, waxes and wanes  Received toradol 30mg IV, fentanyl 100mcg PTA without much relief  Given dilaudid 1mg IV here on arrival prior to CT  After CT pt still has severe L flank pain  CT reviewed - appears to have approx 4mm stone L mid ureter with mild hydroureter  Will check BMP, UA, aggressive multimodal analgesia  If unable to control pain, admit for urology eval and possible intervention        ED Course         Critical Care Time  Procedures

## 2021-08-21 NOTE — H&P
INTERNAL MEDICINE RESIDENCY ADMISSION H&P     Name: Arnoldo Fofana   Age & Sex: 54 y o  female   MRN: [id number]  Unit/Bed#: -01   Encounter: [id number]  Primary Care Provider: Ana Pedersen DO    Code Status: Level 1 - Full Code  Admission Status: OBSERVATION  Disposition: Patient requires Med/Surg    Admit to team: SOD Team C     ASSESSMENT/PLAN     Principal Problem:    Renal calculi  Active Problems:    Intractable pain      * Renal calculi  Assessment & Plan  Patient has history of recurrent stones, previous stone analysis demonstrated calcium oxalate  Family history of stones in mother  Patient is afebrile hemodynamically stable and house left-sided costo vertebral tenderness    CT demonstrates 4 x 3 x 6 mm stone in the left mid ureter  UA negative for nitrites or bacteria, positive and innumerable RBCs    Ceftriaxone x1    IV fluids isolate 125 cc  Tamsulosin  Per day  Neuro consult, appreciate recommendations  Zofran p r n  Nausea  NPO  Dilaudid, Toradol and lidocaine patch for pain control    Intractable pain  Assessment & Plan  Secondary to nephrolithiasis, see above for more details      VTE Pharmacologic Prophylaxis: Sequential compression device (Venodyne)   VTE Mechanical Prophylaxis: sequential compression device    CHIEF COMPLAINT     Chief Complaint   Patient presents with    Flank Pain     per ems, pt c/o left sided flank pain and n/v, hx kidney stones  HISTORY OF PRESENT ILLNESS   Patient is a 68-year-old female with past medical history pseduotumor cerebri, titanium padlock on stomach,  hx recurrent stone (calcium ox) who presents today with complaints of sudden sharp and constant pain in her left flank  She states that approximately 1 week ago the pain began and was colicky in nature however overnight, the pain acutely worsen  Patient endorses nausea and N BNP vomitus x3 with some dizziness    Pain was previously 10/10 and currently 4/10, controlled with medications provided by ED   Patient notes increased frequency of urination about a month ago but no fevers, chills, blood or dysuria noted  Patient states that has history of recurrent stones and her mother; fluid intake with water and cranberry juice has been adequate  Patient has not had any NSAIDs recently    While in the ED patient was given fentanyl 100 mcg chondral hydromorphone 1 mg x 3, ketorolac 30 mg x 1 and lidocaine patch  Patient was hemodynamically stable and CT demonstrated 4 by size 3 x 6 stone in left ureter  Patient will be admitted under observation to the hospital for Urology consult and potential intervention  Medical merijuana  1/2 ppd for 20 years, quit   Alcohol none      REVIEW OF SYSTEMS     Review of Systems   Constitutional: Positive for diaphoresis  Negative for chills and fever  Respiratory: Negative for shortness of breath  Cardiovascular: Negative for chest pain and palpitations  Gastrointestinal: Positive for nausea and vomiting  Genitourinary: Positive for frequency and hematuria  Negative for dysuria  Musculoskeletal: Positive for back pain  OBJECTIVE     Vitals:    21 1215 21 1230 21 1245 21 1327   BP: 116/53 111/60 111/60 130/83   BP Location:       Pulse: 72 64 76 69   Resp:    Temp: (!) 97 °F (36 1 °C)  97 5 °F (36 4 °C) 97 8 °F (36 6 °C)   TempSrc: Temporal  Temporal Oral   SpO2: 100% 98% 98% 98%   Weight:    59 kg (130 lb 1 1 oz)   Height:    5' 3" (1 6 m)      Temperature:   Temp (24hrs), Av 5 °F (36 4 °C), Min:97 °F (36 1 °C), Max:97 8 °F (36 6 °C)    Temperature: 97 8 °F (36 6 °C)  Intake & Output:  I/O        07 -  0700  07 -  07 07 -  0700    IV Piggyback  100     Total Intake(mL/kg)  100 (1 7)     Net  +100                Weights:   IBW (Ideal Body Weight): 52 4 kg    Body mass index is 23 04 kg/m²    Weight (last 2 days)     Date/Time   Weight    21 13:27:46   59 (130 07)    21 0106   59 (130)            Physical Exam  Constitutional:       Appearance: Normal appearance  She is not ill-appearing  HENT:      Mouth/Throat:      Mouth: Mucous membranes are dry  Cardiovascular:      Rate and Rhythm: Normal rate and regular rhythm  Heart sounds: No murmur heard  No gallop  Pulmonary:      Effort: Pulmonary effort is normal       Breath sounds: No rales  Abdominal:      Palpations: Abdomen is soft  Tenderness: There is no abdominal tenderness  There is left CVA tenderness  There is no right CVA tenderness or guarding  Musculoskeletal:      Right lower leg: No edema  Left lower leg: No edema  Skin:     General: Skin is warm and dry  Findings: No erythema  Neurological:      Mental Status: She is alert and oriented to person, place, and time  Psychiatric:         Mood and Affect: Mood normal          Behavior: Behavior normal        PAST MEDICAL HISTORY     Past Medical History:   Diagnosis Date    Abdominal pain     Brain condition     Pseudotumor Cerebri     Chronic kidney disease     De Quervain's disease (tenosynovitis)     Esophageal perforation     Gastric leak     GERD (gastroesophageal reflux disease)     Headache     Idiopathic intracranial hypertension     Kidney stone     Migraine     No blood products     per pt: personal and [employer] beliefs   surgeon office aware 12/13/19    Papilledema, both eyes     PONV (postoperative nausea and vomiting)     Postgastrectomy malabsorption     Presence of lumboperitoneal shunt     Resolved: Sep 20, 2017    Rotator cuff tendinitis     Resolved: Aug 23, 2017    Visual field defect      PAST SURGICAL HISTORY     Past Surgical History:   Procedure Laterality Date    CSF SHUNT      LP shunt - 2015 -  shunt - 2017    ESOPHAGOGASTRODUODENOSCOPY N/A 2/23/2018    Procedure: ESOPHAGOGASTRODUODENOSCOPY (EGD) WITH ESOPHAGEAL STENT PLACEMENT;  Surgeon: Maricarmen Dowd MD;  [facility]; Service: Thoracic    ESOPHAGOGASTRODUODENOSCOPY N/A 2/23/2018    Procedure: ESOPHAGOGASTRODUODENOSCOPY (EGD) WITH REMOVAL ESOPHAGEAL STENT  AND REPLACEMENT WITH 23mm X155mm [address];  Surgeon: Bobby Brennan MD;  [facility];  Service: Thoracic    ESOPHAGOGASTRODUODENOSCOPY N/A 3/28/2018    Procedure: ESOPHAGOGASTRODUODENOSCOPY (EGD) with PEJ placement ;  Surgeon: Gabriella Plaza MD;  [facility]; Service: Gastroenterology    ESOPHAGOGASTRODUODENOSCOPY N/A 4/5/2018    Procedure: ESOPHAGOGASTRODUODENOSCOPY (EGD) with botox injection and kaofed placement;  Surgeon: Helene Zavala DO;  [facility]; Service: Gastroenterology    ESOPHAGOGASTRODUODENOSCOPY N/A 4/10/2018    Procedure: ESOPHAGOGASTRODUODENOSCOPY (EGD) with Kaofed placement;  Surgeon: August Huerta MD;  [facility]; Service: Gastroenterology    ESOPHAGOSCOPY WITH STENT INSERTION N/A 1/24/2018    Procedure: INSERTION STENT ESOPHAGEAL;  Surgeon: Maria Fernanda Snow MD;  [facility]; Service: Gastroenterology    EYE SURGERY Bilateral 1980    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)     FL RETROGRADE PYELOGRAM  6/24/2020    GASTRIC BYPASS  12/19/2016    Lap sleeve gastrectomy w/shunt length shortening procedure    HIATAL HERNIA REPAIR      HYSTERECTOMY  03/14/2013    IR LUMBAR PUNCTURE  8/29/2018    LAPAROTOMY N/A 1/25/2018    Procedure: Exploratory Laparotomy, wash out,placement of drains, placement of NG feeding tube ;   Surgeon: Cassius Huynh DO;  [facility];  Service: General    LUMBAR PERITONEAL SHUNT  11/19/2015    Laparoscopic assisted    SD CREATE SHUNT:VENTRIC-ATRIAL Right 12/18/2019    Procedure: IMAGE GUIDED INSERTION OF RIGHT CORONAL VENTRICULAR-ATRIAL SHUNT;  Surgeon: Eliza Haney MD;  [facility];  Service: Neurosurgery    SD CREATE SHUNT:VENTRIC-PERITONEAL Right 5/31/2017    Procedure: IMAGE GUIDED CORONAL PLACEMENT OF PROGRAMABLE VENTRICULAR-PERITONEAL SHUNT, REMOVAL OF LP SHUNT ;  Surgeon: Isabella Austin MD;  [facility];  Service: Neurosurgery    RI CYSTO/URETERO W/LITHOTRIPSY &INDWELL STENT INSRT Bilateral 2020    Procedure: CYSTOSCOPY URETEROSCOPY WITH LITHOTRIPSY HOLMIUM LASER, RETROGRADE PYELOGRAM AND exchange bilateral  STENTs URETERAL;  Surgeon: Edil Moeller MD;  [facility];  Service: Urology    RI CYSTOURETHROSCOPY,URETER CATHETER N/A 2020    Procedure: Bilateral CYSTOSCOPY RETROGRADE PYELOGRAM WITH INSERTION STENT URETERAL;  Surgeon: Jeremiah Springer MD;  [facility];  Service: Urology    RI EGD TRANSORAL BIOPSY SINGLE/MULTIPLE N/A 2018    Procedure: ESOPHAGOGASTRODUODENOSCOPY (EGD) with padlock clip placement;  Surgeon: Aminah Cuenca MD;  [facility];   Service: Bariatrics    RI REMOVAL,COMPLETE CSF SHUNT,W/O REPLACE Right 2018    Procedure: Removal of  shunt;  Surgeon: Isabella Austin MD;  [facility];  Service: Neurosurgery    RI REPLACEMENT/REVISION,CSF SHUNT Right 2018    Procedure: Externalization of right-sided SHUNT VENTRICULAR-PERITONEAL in anterior chest wall ribs two and three level  ;  Surgeon: Ariel Archer MD;  [facility];  Service: Neurosurgery    WRIST SURGERY Right     x3 ,      SOCIAL & FAMILY HISTORY     Social History     Substance and Sexual Activity   Alcohol Use Never     Substance and Sexual Activity   Alcohol Use Never        Substance and Sexual Activity   Drug Use Yes    Frequency: 7 0 times per week    Types: Marijuana    Comment: medical marijuana, vaping & topical      Social History     Tobacco Use   Smoking Status Former Smoker    Packs/day: 0 50    Years: 20 00    Pack years: 10 00    Quit date: 2012    Years since quittin 9   Smokeless Tobacco Never Used     Family History   Problem Relation Age of Onset    No Known Problems Mother     No Known Problems Father     Thyroid cancer Brother     Colon cancer Maternal Grandfather     Cancer Paternal Grandmother     Cancer Paternal Grandfather     Cancer Family         Gastric    Leukemia Family     Colon cancer Family     Pancreatic cancer Family      LABORATORY DATA     Labs: I have personally reviewed pertinent reports  Results from last 7 days   Lab Units 08/21/21  0138   WBC Thousand/uL 14 89*   HEMOGLOBIN g/dL 8 7*   HEMATOCRIT % 30 4*   PLATELETS Thousands/uL 371   NEUTROS PCT % 80*   MONOS PCT % 6      Results from last 7 days   Lab Units 08/21/21  0138   POTASSIUM mmol/L 3 8   CHLORIDE mmol/L 105   CO2 mmol/L 28   BUN mg/dL 20   CREATININE mg/dL 0 65   CALCIUM mg/dL 8 7   ALK PHOS U/L 58   ALT U/L 16   AST U/L 11                          Micro:  Lab Results   Component Value Date    URINECX No Growth <1000 cfu/mL 06/06/2020    URINECX No Growth <1000 cfu/mL 06/06/2020    URINECX (A) 06/06/2020     0335-7143 cfu/ml Beta Hemolytic Streptococcus Group B    WOUNDCULT No growth 02/05/2018    WOUNDCULT 2+ Growth of Enterococcus faecalis (A) 01/25/2018     IMAGING & DIAGNOSTIC TESTS     Imaging: I have personally reviewed pertinent reports  CT renal stone study abdomen pelvis without contrast    Result Date: 8/21/2021  Impression: Bilateral nephrolithiasis with 4 x 3 x 6 mm calculus in the left mid ureter with associated moderate left hydronephrosis  Workstation performed: [id number]     EKG, Pathology, and Other Studies: I have personally reviewed pertinent reports  ALLERGIES     Allergies   Allergen Reactions    Benadryl [Diphenhydramine] Anaphylaxis     Throat closing    Phenergan [Promethazine] Anaphylaxis     MEDICATIONS PRIOR TO ARRIVAL     Prior to Admission medications    Medication Sig Start Date End Date Taking?  Authorizing Provider   calcium citrate-vitamin D (CITRACAL+D) 315-200 MG-UNIT per tablet Take 1 tablet by mouth 2 (two) times a day    Historical Provider, MD   dexlansoprazole (Dexilant) 60 MG capsule Take 1 capsule (60 mg total) by mouth daily 4/30/21 7/29/21  Tripp Gould PA-C   Galcanezumab-gnlm 120 MG/ML SOAJ Inject 1 pen under the skin every 30 (thirty) days 5/20/21   Tripp Gould PA-C   indomethacin (INDOCIN) 25 mg capsule Take 1 capsule (25 mg total) by mouth 3 (three) times a day with meals 7/30/21   Tripp Gould PA-C   Multiple Vitamin (MULTIVITAMIN) tablet Take 1 tablet by mouth daily Wears a patch    Historical Provider, MD Guicho Silver 65 Provider, MD   pantoprazole (PROTONIX) 40 mg tablet Take 1 tablet (40 mg total) by mouth 2 (two) times a day 7/21/21 10/19/21  Wendy Reynolds PA-C   sucralfate (CARAFATE) 1 g tablet Take 1 tablet (1 g total) by mouth 3 (three) times a day as needed (headache-take before indomethacin) 7/30/21   Tripp Gould PA-C     MEDICATIONS ADMINISTERED IN LAST 24 HOURS     Medication Administration - last 24 hours from 08/20/2021 1343 to 08/21/2021 1343       Date/Time Order Dose Route Action Action by     08/21/2021 0128 ketorolac (FOR EMS ONLY) (TORADOL) injection 30 mg 0 mg Does not apply Given to EMS Chip Bustillos RN     08/21/2021 0128 ondansetron (FOR EMS ONLY) (ZOFRAN) 4 mg/2 mL injection 4 mg 0 mg Does not apply Given to EMS Chip Bustillos RN     08/21/2021 0129 fentanyl citrate (PF) (FOR EMS ONLY) 100 mcg/2 mL injection 100 mcg 0 mcg Does not apply Given to EMS Chip Bustillos RN     08/21/2021 0138 HYDROmorphone (DILAUDID) injection 1 mg 1 mg Intravenous Given Laura Parrish     08/21/2021 0417 lidocaine (PF) (XYLOCAINE-MPF) 1 % 89 mg in dextrose 5 % 50 mL IVPB 0 mg/kg Intravenous Stopped Naya Parham RN     08/21/2021 0347 lidocaine (PF) (XYLOCAINE-MPF) 1 % 89 mg in dextrose 5 % 50 mL IVPB 89 mg Intravenous [address] Zelpha Prasad, RN     08/21/2021 0255 HYDROmorphone (DILAUDID) injection 1 mg 1 mg Intravenous Given Laura Parrish     08/21/2021 0553 ondansetron (ZOFRAN) injection 4 mg 4 mg Intravenous Given Naya Parham RN 08/21/2021 0554 HYDROmorphone (DILAUDID) injection 1 mg 1 mg Intravenous Given Chitra Valenzuela, MANDIE     08/21/2021 1007 multivitamin-minerals (CENTRUM) tablet 1 tablet 0 tablet Oral Hold Earl Alexandre RN     08/21/2021 1007 pantoprazole (PROTONIX) EC tablet 40 mg 0 mg Oral Hold Earl Alexandre RN     08/21/2021 1156 multi-electrolyte (PLASMALYTE-A/ISOLYTE-S PH 7 4) IV solution 0  Intravenous Stopped Magdalena Devlin, CRNA     08/21/2021 1120 multi-electrolyte (PLASMALYTE-A/ISOLYTE-S PH 7 4) IV solution   Intravenous Restarted Magdalena Devlin, CRNA     08/21/2021 1119 multi-electrolyte (PLASMALYTE-A/ISOLYTE-S PH 7 4) IV solution   Intravenous Paused Magdalena Devlin, CRNA     08/21/2021 1119 multi-electrolyte (PLASMALYTE-A/ISOLYTE-S PH 7 4) IV solution   Intravenous [facility] [id number] [facility] [facility], DO     08/21/2021 0932 multi-electrolyte (PLASMALYTE-A/ISOLYTE-S PH 7 4) IV solution 125 mL/hr Intravenous New Bag Earl Alexandre RN     08/21/2021 1342 acetaminophen (TYLENOL) tablet 650 mg 650 mg Oral Given Magdy Joseph, MANDIE     08/21/2021 1016 ondansetron (ZOFRAN) injection 4 mg 4 mg Intravenous Given Earl Alexandre RN     08/21/2021 1252 lidocaine (LIDODERM) 5 % patch 1 patch   Topical MAR Unhold Magdy Joseph, MANDIE     08/21/2021 1051 lidocaine (LIDODERM) 5 % patch 1 patch   Topical MAR Hold Automatic Transfer Provider     08/21/2021 1252 tamsulosin (FLOMAX) capsule 0 4 mg   Oral MAR Unhold Magdy Joseph RN     08/21/2021 1051 tamsulosin (FLOMAX) capsule 0 4 mg   Oral MAR Hold Automatic Transfer Provider     08/21/2021 1252 ceftriaxone (ROCEPHIN) 1 g/50 mL in dextrose IVPB   Intravenous MAR Unhold Magdy Joseph RN     08/21/2021 1120 ceftriaxone (ROCEPHIN) 1 g/50 mL in dextrose IVPB 1,000 mg Intravenous Given Magdalena Devlin CRNA     08/21/2021 1051 ceftriaxone (ROCEPHIN) 1 g/50 mL in dextrose IVPB   Intravenous [facility] Hold Automatic Transfer Provider     08/21/2021 2589 ceftriaxone (ROCEPHIN) 1 g/50 mL in dextrose IVPB 1,000 mg Intravenous New Bag Sergio Doyle RN     08/21/2021 1252 phenazopyridine (PYRIDIUM) tablet 100 mg   Oral MAR Unhold Manoj Chirinos RN     08/21/2021 1200 phenazopyridine (PYRIDIUM) tablet 100 mg   Oral Dose Auto Held Automatic Transfer Provider     08/21/2021 1051 phenazopyridine (PYRIDIUM) tablet 100 mg   Oral MAR Hold Automatic Transfer Provider     08/21/2021 1252 HYDROmorphone (DILAUDID) injection 0 5 mg   Intravenous MAR Unhold Manoj Chirinos RN     08/21/2021 1051 HYDROmorphone (DILAUDID) injection 0 5 mg   Intravenous MAR Hold Automatic Transfer Provider     08/21/2021 1016 HYDROmorphone (DILAUDID) injection 0 5 mg 0 5 mg Intravenous Given Sergio Doyle RN     08/21/2021 1252 ketorolac (TORADOL) injection 30 mg   Intravenous MAR Unhold Manoj Chirinos RN     08/21/2021 1051 ketorolac (TORADOL) injection 30 mg   Intravenous MAR Hold Automatic Transfer Provider        CURRENT MEDICATIONS     multi-electrolyte, 125 mL/hr, Last Rate: Stopped (08/21/21 1156)          Admission Time  I spent 20 minutes admitting the patient  This involved direct patient contact where I performed a full history and physical, reviewing previous records, and reviewing laboratory and other diagnostic studies  Portions of the record may have been created with voice recognition software  Occasional wrong word or "sound a like" substitutions may have occurred due to the inherent limitations of voice recognition software    Read the chart carefully and recognize, using context, where substitutions have occurred     ==  Harmeet Burger, [address] [facility] [place]  Internal Medicine Residency PGY-2

## 2021-08-22 ENCOUNTER — TELEPHONE (OUTPATIENT)
Dept: OTHER | Facility: HOSPITAL | Age: 46
End: 2021-08-22

## 2021-08-22 PROBLEM — D64.9 ANEMIA: Status: ACTIVE | Noted: 2021-08-22

## 2021-08-22 LAB
ANION GAP SERPL CALCULATED.3IONS-SCNC: 5 MMOL/L (ref 4–13)
BACTERIA UR CULT: ABNORMAL
BACTERIA UR CULT: ABNORMAL
BASOPHILS # BLD AUTO: 0.03 THOUSANDS/ΜL (ref 0–0.1)
BASOPHILS NFR BLD AUTO: 0 % (ref 0–1)
BUN SERPL-MCNC: 17 MG/DL (ref 5–25)
CALCIUM SERPL-MCNC: 8.7 MG/DL (ref 8.3–10.1)
CHLORIDE SERPL-SCNC: 105 MMOL/L (ref 100–108)
CO2 SERPL-SCNC: 28 MMOL/L (ref 21–32)
CREAT SERPL-MCNC: 0.6 MG/DL (ref 0.6–1.3)
EOSINOPHIL # BLD AUTO: 0.02 THOUSAND/ΜL (ref 0–0.61)
EOSINOPHIL NFR BLD AUTO: 0 % (ref 0–6)
ERYTHROCYTE [DISTWIDTH] IN BLOOD BY AUTOMATED COUNT: 21.8 % (ref 11.6–15.1)
FERRITIN SERPL-MCNC: 2 NG/ML (ref 8–388)
GFR SERPL CREATININE-BSD FRML MDRD: 110 ML/MIN/1.73SQ M
GLUCOSE SERPL-MCNC: 107 MG/DL (ref 65–140)
HCT VFR BLD AUTO: 30 % (ref 34.8–46.1)
HGB BLD-MCNC: 8.6 G/DL (ref 11.5–15.4)
IMM GRANULOCYTES # BLD AUTO: 0.04 THOUSAND/UL (ref 0–0.2)
IMM GRANULOCYTES NFR BLD AUTO: 1 % (ref 0–2)
IRON SATN MFR SERPL: 5 %
IRON SERPL-MCNC: 22 UG/DL (ref 50–170)
LYMPHOCYTES # BLD AUTO: 1.03 THOUSANDS/ΜL (ref 0.6–4.47)
LYMPHOCYTES NFR BLD AUTO: 12 % (ref 14–44)
MCH RBC QN AUTO: 22.1 PG (ref 26.8–34.3)
MCHC RBC AUTO-ENTMCNC: 28.7 G/DL (ref 31.4–37.4)
MCV RBC AUTO: 77 FL (ref 82–98)
MONOCYTES # BLD AUTO: 0.73 THOUSAND/ΜL (ref 0.17–1.22)
MONOCYTES NFR BLD AUTO: 9 % (ref 4–12)
NEUTROPHILS # BLD AUTO: 6.55 THOUSANDS/ΜL (ref 1.85–7.62)
NEUTS SEG NFR BLD AUTO: 78 % (ref 43–75)
NRBC BLD AUTO-RTO: 0 /100 WBCS
PLATELET # BLD AUTO: 341 THOUSANDS/UL (ref 149–390)
PMV BLD AUTO: 10.2 FL (ref 8.9–12.7)
POTASSIUM SERPL-SCNC: 4.2 MMOL/L (ref 3.5–5.3)
RBC # BLD AUTO: 3.9 MILLION/UL (ref 3.81–5.12)
SODIUM SERPL-SCNC: 138 MMOL/L (ref 136–145)
TIBC SERPL-MCNC: 414 UG/DL (ref 250–450)
WBC # BLD AUTO: 8.4 THOUSAND/UL (ref 4.31–10.16)

## 2021-08-22 PROCEDURE — 80048 BASIC METABOLIC PNL TOTAL CA: CPT | Performed by: UROLOGY

## 2021-08-22 PROCEDURE — 83540 ASSAY OF IRON: CPT | Performed by: INTERNAL MEDICINE

## 2021-08-22 PROCEDURE — 83550 IRON BINDING TEST: CPT | Performed by: INTERNAL MEDICINE

## 2021-08-22 PROCEDURE — 85025 COMPLETE CBC W/AUTO DIFF WBC: CPT | Performed by: UROLOGY

## 2021-08-22 PROCEDURE — 82728 ASSAY OF FERRITIN: CPT | Performed by: INTERNAL MEDICINE

## 2021-08-22 PROCEDURE — 99226 PR SBSQ OBSERVATION CARE/DAY 35 MINUTES: CPT | Performed by: INTERNAL MEDICINE

## 2021-08-22 PROCEDURE — NC001 PR NO CHARGE: Performed by: NURSE PRACTITIONER

## 2021-08-22 RX ORDER — HYDROCODONE BITARTRATE AND ACETAMINOPHEN 5; 325 MG/1; MG/1
1 TABLET ORAL EVERY 6 HOURS PRN
Status: DISCONTINUED | OUTPATIENT
Start: 2021-08-22 | End: 2021-08-23 | Stop reason: HOSPADM

## 2021-08-22 RX ORDER — PHENAZOPYRIDINE HYDROCHLORIDE 100 MG/1
200 TABLET, FILM COATED ORAL
Status: DISCONTINUED | OUTPATIENT
Start: 2021-08-22 | End: 2021-08-23 | Stop reason: HOSPADM

## 2021-08-22 RX ORDER — KETOROLAC TROMETHAMINE 30 MG/ML
30 INJECTION, SOLUTION INTRAMUSCULAR; INTRAVENOUS EVERY 6 HOURS PRN
Status: DISCONTINUED | OUTPATIENT
Start: 2021-08-22 | End: 2021-08-23 | Stop reason: HOSPADM

## 2021-08-22 RX ORDER — ONDANSETRON 2 MG/ML
4 INJECTION INTRAMUSCULAR; INTRAVENOUS EVERY 4 HOURS PRN
Status: DISCONTINUED | OUTPATIENT
Start: 2021-08-22 | End: 2021-08-23 | Stop reason: HOSPADM

## 2021-08-22 RX ADMIN — PHENAZOPYRIDINE 200 MG: 100 TABLET ORAL at 16:36

## 2021-08-22 RX ADMIN — PHENAZOPYRIDINE 100 MG: 100 TABLET ORAL at 09:20

## 2021-08-22 RX ADMIN — SODIUM CHLORIDE, SODIUM GLUCONATE, SODIUM ACETATE, POTASSIUM CHLORIDE, MAGNESIUM CHLORIDE, SODIUM PHOSPHATE, DIBASIC, AND POTASSIUM PHOSPHATE 125 ML/HR: .53; .5; .37; .037; .03; .012; .00082 INJECTION, SOLUTION INTRAVENOUS at 12:28

## 2021-08-22 RX ADMIN — SODIUM CHLORIDE, SODIUM GLUCONATE, SODIUM ACETATE, POTASSIUM CHLORIDE, MAGNESIUM CHLORIDE, SODIUM PHOSPHATE, DIBASIC, AND POTASSIUM PHOSPHATE 125 ML/HR: .53; .5; .37; .037; .03; .012; .00082 INJECTION, SOLUTION INTRAVENOUS at 04:43

## 2021-08-22 RX ADMIN — PANTOPRAZOLE SODIUM 40 MG: 40 TABLET, DELAYED RELEASE ORAL at 05:54

## 2021-08-22 RX ADMIN — HYDROMORPHONE HYDROCHLORIDE 0.5 MG: 1 INJECTION, SOLUTION INTRAMUSCULAR; INTRAVENOUS; SUBCUTANEOUS at 22:50

## 2021-08-22 RX ADMIN — Medication 1 TABLET: at 09:20

## 2021-08-22 RX ADMIN — KETOROLAC TROMETHAMINE 30 MG: 30 INJECTION, SOLUTION INTRAMUSCULAR; INTRAVENOUS at 16:36

## 2021-08-22 RX ADMIN — LIDOCAINE 1 PATCH: 50 PATCH TOPICAL at 09:21

## 2021-08-22 RX ADMIN — SODIUM CHLORIDE, SODIUM GLUCONATE, SODIUM ACETATE, POTASSIUM CHLORIDE, MAGNESIUM CHLORIDE, SODIUM PHOSPHATE, DIBASIC, AND POTASSIUM PHOSPHATE 125 ML/HR: .53; .5; .37; .037; .03; .012; .00082 INJECTION, SOLUTION INTRAVENOUS at 20:29

## 2021-08-22 RX ADMIN — ACETAMINOPHEN 650 MG: 325 TABLET, FILM COATED ORAL at 05:54

## 2021-08-22 RX ADMIN — TAMSULOSIN HYDROCHLORIDE 0.4 MG: 0.4 CAPSULE ORAL at 16:36

## 2021-08-22 RX ADMIN — HYDROCODONE BITARTRATE AND ACETAMINOPHEN 1 TABLET: 5; 325 TABLET ORAL at 20:44

## 2021-08-22 RX ADMIN — Medication 1000 MG: at 09:22

## 2021-08-22 RX ADMIN — PHENAZOPYRIDINE 200 MG: 100 TABLET ORAL at 12:25

## 2021-08-22 RX ADMIN — KETOROLAC TROMETHAMINE 30 MG: 30 INJECTION, SOLUTION INTRAMUSCULAR; INTRAVENOUS at 05:54

## 2021-08-22 RX ADMIN — HYDROCODONE BITARTRATE AND ACETAMINOPHEN 1 TABLET: 5; 325 TABLET ORAL at 12:25

## 2021-08-22 RX ADMIN — HYDROMORPHONE HYDROCHLORIDE 0.5 MG: 1 INJECTION, SOLUTION INTRAMUSCULAR; INTRAVENOUS; SUBCUTANEOUS at 09:21

## 2021-08-22 RX ADMIN — HYDROMORPHONE HYDROCHLORIDE 0.5 MG: 1 INJECTION, SOLUTION INTRAMUSCULAR; INTRAVENOUS; SUBCUTANEOUS at 04:33

## 2021-08-22 RX ADMIN — ACETAMINOPHEN 650 MG: 325 TABLET, FILM COATED ORAL at 22:49

## 2021-08-22 RX ADMIN — ONDANSETRON 4 MG: 2 INJECTION INTRAMUSCULAR; INTRAVENOUS at 09:21

## 2021-08-22 RX ADMIN — ONDANSETRON 4 MG: 2 INJECTION INTRAMUSCULAR; INTRAVENOUS at 04:53

## 2021-08-22 RX ADMIN — PANTOPRAZOLE SODIUM 40 MG: 40 TABLET, DELAYED RELEASE ORAL at 20:48

## 2021-08-22 NOTE — PROGRESS NOTES
INTERNAL MEDICINE RESIDENCY PROGRESS NOTE     Name: Joan Mcfarlane   Age & Sex: 54 y o  female   MRN: [id number]  Unit/Bed#: -01   Encounter: [id number]  Team: SOD Team C     PATIENT INFORMATION     Name: Joan Mcfarlane   Age & Sex: 54 y o  female   MRN: [id number]  Hospital Stay Days: 0    ASSESSMENT/PLAN     Principal Problem:    Renal calculi  Active Problems:    Refusal of blood product    Gastroesophageal reflux disease    Intractable pain    Anemia      Anemia  Assessment & Plan  Iron: 22  Ferratin: 2  TIBC: 414  Iron Saturation 5%    Follow up outpatient with PCP      Intractable pain  Assessment & Plan  10/10 pain --> 9/10 pain after stone removal and stent placement, stent will be removed on Tuesday at outpatient urology visit   Tylenol changed to Q6H scheduled overnight due to severe pain   Prn Dilaudid, Toradol and lidocaine patch for pain control    Urology following, appreciate recommendations:   -Currently on pyridium, will trial increased dose    Gastroesophageal reflux disease  Assessment & Plan  Pantoprazole 40 mg BID    * Renal calculi  Assessment & Plan  Patient has history of recurrent stones, previous stone analysis demonstrated calcium oxalate  Family history of stones in mother  Patient is afebrile hemodynamically stable and house left-sided costo vertebral tenderness    CT demonstrates 4 x 3 x 6 mm stone in the left mid ureter  UA negative for nitrites or bacteria, positive and innumerable RBCs  S/p left ureteroscopy with retrograde pyelogram, removal of stone and insertion of left ureteral stent with no complications  Ceftriaxone 1g Q24H  WBC 14 89 --> 8 4 today, WNL  IV fluids isolate 125 cc  Urology following, appreciate recommendations  Currently on pyridium, will trial increased dose        Zofran prn changed to Q4H scheduled due to Nausea and vomiting  Tylenol changed to Q6H scheduled due to severe pain   Prn Dilaudid, Toradol and lidocaine patch for pain control      Disposition: inpatient      SUBJECTIVE     Patient seen and examined  Overnight she had severe pain which led to vomiting  She was given an extra dose of dilaudid, tylenol was changed to scheduled Q6H and zofran was increased to Q4H  She is still complaining of 9/10 pain this morning, slightly improved from 10/10  She states that it "feels like another kidney stone attack" but just slightly better since the stone was removed  She states the pain is in her lower abdomen and continues to her back  It hurts so bad that it makes her vomit  Denies headache, dizziness, chest pain, SOB, fever/chills, numbness, or tingling  OBJECTIVE     Vitals:    21 0732 21 0758 21 1101 21 1441   BP: 136/79  128/76 122/77   BP Location:       Pulse: 59  72 61   Resp: 16  17 14   Temp: 98 6 °F (37 °C)  98 7 °F (37 1 °C) 98 8 °F (37 1 °C)   TempSrc:       SpO2: 100% 100% 98% 99%   Weight:       Height:          Temperature:   Temp (24hrs), Av 5 °F (36 9 °C), Min:97 6 °F (36 4 °C), Max:98 8 °F (37 1 °C)    Temperature: 98 8 °F (37 1 °C)  Intake & Output:  I/O        07 -  0700  07 -  0700  07 -  0700    P  O   60 0    I V  (mL/kg)  2087 5 (35 4)     IV Piggyback 100      Total Intake(mL/kg) 100 (1 7) 2147 5 (36 4) 0 (0)    Urine (mL/kg/hr)  400 (0 3) 800 (1 7)    Emesis/NG output  0     Total Output  400 800    Net +100 +1747 5 -800           Unmeasured Emesis Occurrence  1 x         Weights:   IBW (Ideal Body Weight): 52 4 kg    Body mass index is 23 04 kg/m²  Weight (last 2 days)     Date/Time   Weight    21 13:27:46   59 (130 07)    21 0106   59 (130)            Physical Exam  Constitutional:       General: She is not in acute distress  Appearance: She is not ill-appearing  HENT:      Head: Normocephalic and atraumatic  Cardiovascular:      Rate and Rhythm: Normal rate and regular rhythm     Pulmonary:      Effort: Pulmonary effort is normal  No respiratory distress  Breath sounds: Normal breath sounds  Abdominal:      General: Abdomen is flat  There is no distension  Palpations: Abdomen is soft  Tenderness: There is abdominal tenderness  Musculoskeletal:      Right lower leg: No edema  Left lower leg: No edema  Skin:     General: Skin is warm and dry  Neurological:      General: No focal deficit present  Mental Status: She is alert and oriented to person, place, and time  Psychiatric:         Mood and Affect: Mood normal          Behavior: Behavior normal        LABORATORY DATA     Labs: I have personally reviewed pertinent reports  Results from last 7 days   Lab Units 08/22/21 0527 08/21/21  0138   WBC Thousand/uL 8 40 14 89*   HEMOGLOBIN g/dL 8 6* 8 7*   HEMATOCRIT % 30 0* 30 4*   PLATELETS Thousands/uL 341 371   NEUTROS PCT % 78* 80*   MONOS PCT % 9 6      Results from last 7 days   Lab Units 08/22/21 0527 08/21/21  0138   POTASSIUM mmol/L 4 2 3 8   CHLORIDE mmol/L 105 105   CO2 mmol/L 28 28   BUN mg/dL 17 20   CREATININE mg/dL 0 60 0 65   CALCIUM mg/dL 8 7 8 7   ALK PHOS U/L  --  58   ALT U/L  --  16   AST U/L  --  11                            IMAGING & DIAGNOSTIC TESTING     Radiology Results: I have personally reviewed pertinent reports  CT renal stone study abdomen pelvis without contrast    Result Date: 8/21/2021  Impression: Bilateral nephrolithiasis with 4 x 3 x 6 mm calculus in the left mid ureter with associated moderate left hydronephrosis  Workstation performed: [id number]     Other Diagnostic Testing: I have personally reviewed pertinent reports      ACTIVE MEDICATIONS     Current Facility-Administered Medications   Medication Dose Route Frequency    acetaminophen (TYLENOL) tablet 650 mg  650 mg Oral Q6H [address]    ceftriaxone (ROCEPHIN) 1 g/50 mL in dextrose IVPB  1,000 mg Intravenous Q24H    HYDROcodone-acetaminophen (NORCO) 5-325 mg per tablet 1 tablet  1 tablet Oral Q6H PRN    HYDROmorphone (DILAUDID) injection 0 5 mg  0 5 mg Intravenous Q3H PRN    ketorolac (TORADOL) injection 30 mg  30 mg Intravenous Q6H PRN    lidocaine (LIDODERM) 5 % patch 1 patch  1 patch Topical Daily PRN    multi-electrolyte (PLASMALYTE-A/ISOLYTE-S PH 7 4) IV solution  125 mL/hr Intravenous Continuous    multivitamin-minerals (CENTRUM) tablet 1 tablet  1 tablet Oral Daily    ondansetron (ZOFRAN) injection 4 mg  4 mg Intravenous Q4H PRN    pantoprazole (PROTONIX) EC tablet 40 mg  40 mg Oral BID    phenazopyridine (PYRIDIUM) tablet 200 mg  200 mg Oral TID With Meals    tamsulosin (FLOMAX) capsule 0 4 mg  0 4 mg Oral Daily With Dinner       VTE Pharmacologic Prophylaxis: Reason for no pharmacologic prophylaxis low risk  VTE Mechanical Prophylaxis: sequential compression device    Portions of the record may have been created with voice recognition software  Occasional wrong word or "sound a like" substitutions may have occurred due to the inherent limitations of voice recognition software    Read the chart carefully and recognize, using context, where substitutions have occurred   ==  Simon Thornton MD  [facility]  Internal Medicine Residency PGY-1

## 2021-08-22 NOTE — PLAN OF CARE
Problem: MOBILITY - ADULT  Goal: Maintain or return to baseline ADL function  Description: INTERVENTIONS:  -  Assess patient's ability to carry out ADLs; assess patient's baseline for ADL function and identify physical deficits which impact ability to perform ADLs (bathing, care of mouth/teeth, toileting, grooming, dressing, etc )  - Assess/evaluate cause of self-care deficits   - Assess range of motion  - Assess patient's mobility; develop plan if impaired  - Assess patient's need for assistive devices and provide as appropriate  - Encourage maximum independence but intervene and supervise when necessary  - Involve family in performance of ADLs  - Assess for home care needs following discharge   - Consider OT consult to assist with ADL evaluation and planning for discharge  - Provide patient education as appropriate  Outcome: Progressing  Goal: Maintains/Returns to pre admission functional level  Description: INTERVENTIONS:  - Perform BMAT or MOVE assessment daily    - Set and communicate daily mobility goal to care team and patient/family/caregiver  - Collaborate with rehabilitation services on mobility goals if consulted  - Perform Range of Motion times a day  - Reposition patient every hours    - Dangle patient  times a day  - Stand patient times a day  - Ambulate patient times a day  - Out of bed to chair  times a day   - Out of bed for meals times a day  - Out of bed for toileting  - Record patient progress and toleration of activity level   Outcome: Progressing     Problem: PAIN - ADULT  Goal: Verbalizes/displays adequate comfort level or baseline comfort level  Description: Interventions:  - Encourage patient to monitor pain and request assistance  - Assess pain using appropriate pain scale  - Administer analgesics based on type and severity of pain and evaluate response  - Implement non-pharmacological measures as appropriate and evaluate response  - Consider cultural and social influences on pain and pain management  - Notify physician/advanced practitioner if interventions unsuccessful or patient reports new pain  Outcome: Progressing     Problem: INFECTION - ADULT  Goal: Absence or prevention of progression during hospitalization  Description: INTERVENTIONS:  - Assess and monitor for signs and symptoms of infection  - Monitor lab/diagnostic results  - Monitor all insertion sites, i e  indwelling lines, tubes, and drains  - Monitor endotracheal if appropriate and nasal secretions for changes in amount and color  - [place] appropriate cooling/warming therapies per order  - Administer medications as ordered  - Instruct and encourage patient and family to use good hand hygiene technique  - Identify and instruct in appropriate isolation precautions for identified infection/condition  Outcome: Progressing  Goal: Absence of fever/infection during neutropenic period  Description: INTERVENTIONS:  - Monitor WBC    Outcome: Progressing     Problem: SAFETY ADULT  Goal: Maintain or return to baseline ADL function  Description: INTERVENTIONS:  -  Assess patient's ability to carry out ADLs; assess patient's baseline for ADL function and identify physical deficits which impact ability to perform ADLs (bathing, care of mouth/teeth, toileting, grooming, dressing, etc )  - Assess/evaluate cause of self-care deficits   - Assess range of motion  - Assess patient's mobility; develop plan if impaired  - Assess patient's need for assistive devices and provide as appropriate  - Encourage maximum independence but intervene and supervise when necessary  - Involve family in performance of ADLs  - Assess for home care needs following discharge   - Consider OT consult to assist with ADL evaluation and planning for discharge  - Provide patient education as appropriate  Outcome: Progressing  Goal: Maintains/Returns to pre admission functional level  Description: INTERVENTIONS:  - Perform BMAT or MOVE assessment daily    - Set and communicate daily mobility goal to care team and patient/family/caregiver  - Collaborate with rehabilitation services on mobility goals if consulted  - Perform Range of Motion  times a day  - Reposition patient every  hours    - Dangle patient times a day  - Stand patient times a day  - Ambulate patient  times a day  - Out of bed to chair  times a day   - Out of bed for meals  times a day  - Out of bed for toileting  - Record patient progress and toleration of activity level   Outcome: Progressing  Goal: Patient will remain free of falls  Description: INTERVENTIONS:  - Educate patient/family on patient safety including physical limitations  - Instruct patient to call for assistance with activity   - Consult OT/PT to assist with strengthening/mobility   - Keep Call bell within reach  - Keep bed low and locked with side rails adjusted as appropriate  - Keep care items and personal belongings within reach  - Initiate and maintain comfort rounds  - Make Fall Risk Sign visible to staff  - Offer Toileting every  Hours, in advance of need  - Initiate/Maintain alarm  - Obtain necessary fall risk management equipment:   - Apply yellow socks and bracelet for high fall risk patients  - Consider moving patient to room near nurses station  Outcome: Progressing     Problem: DISCHARGE PLANNING  Goal: Discharge to home or other facility with appropriate resources  Description: INTERVENTIONS:  - Identify barriers to discharge w/patient and caregiver  - Arrange for needed discharge resources and transportation as appropriate  - Identify discharge learning needs (meds, wound care, etc )  - Arrange for interpretive services to assist at discharge as needed  - Refer to Case Management Department for coordinating discharge planning if the patient needs post-hospital services based on physician/advanced practitioner order or complex needs related to functional status, cognitive ability, or social support system  Outcome: Progressing Problem: Knowledge Deficit  Goal: Patient/family/caregiver demonstrates understanding of disease process, treatment plan, medications, and discharge instructions  Description: Complete learning assessment and assess knowledge base    Interventions:  - Provide teaching at level of understanding  - Provide teaching via preferred learning methods  Outcome: Progressing     Problem: Potential for Falls  Goal: Patient will remain free of falls  Description: INTERVENTIONS:  - Educate patient/family on patient safety including physical limitations  - Instruct patient to call for assistance with activity   - Consult OT/PT to assist with strengthening/mobility   - Keep Call bell within reach  - Keep bed low and locked with side rails adjusted as appropriate  - Keep care items and personal belongings within reach  - Initiate and maintain comfort rounds  - Make Fall Risk Sign visible to staff  - Offer Toileting every  Hours, in advance of need  - Initiate/Maintain alarm  - Obtain necessary fall risk management equipment:   - Apply yellow socks and bracelet for high fall risk patients  - Consider moving patient to room near nurses station  Outcome: Progressing

## 2021-08-22 NOTE — UTILIZATION REVIEW
Initial Clinical Review    Admission: Date/Time/Statement:   Admission Orders (From admission, onward)     Ordered        08/21/21 0722  Place in Observation  Once                   Orders Placed This Encounter   Procedures    Place in Observation     Standing Status:   Standing     Number of Occurrences:   1     Order Specific Question:   Level of Care     Answer:   Med Surg [16]     ED Arrival Information     Expected Arrival Acuity    - 8/21/2021 01:04 Urgent         Means of arrival Escorted by Service Admission type    Ambulance [facility] General Medicine Urgent         Arrival complaint    Kidney Stones        Chief Complaint   Patient presents with    Flank Pain     per ems, pt c/o left sided flank pain and n/v, hx kidney stones  Initial Presentation: 55 y/o female with a PMHx of recurrent nephrolithiasis, s/p multiple interventions, anemia, GERD, migraine headaches, pseudotumor cerebri, hyperlipidemia, h/o gastric bypass surgery, admitted under observation with severe left flank pain with N/V and found to have left mid ureter stone and mild hydroureter  Pt states she increased her fluid intake when she felt the left flank pain starting last week  On exam tender in her left lower abdominal quadrant, (+) left CVA tenderness  Urology consulted, plan for OR  Npo, IVF"s  Analgesics/anetimetics  OPERATIVE REPORT  SURGERY DATE: 8/21/2021   Procedure(s) (LRB):  CYSTOSCOPY; LEFT URETEROSCOPY WITH RETROGRADE PYELOGRAM, REMOVAL OF STONE AND INSERTION LEFT URETERAL STENT (Left)  Anesthesia Type:   General   Operative Findings:  Left proximal ureteral calculus, extracted  Date: 8/22    Day 2: POD #1 cysto, litho and stent  Pt c/o L flank pain and some nausea  WBC improved  Currently on pyridium, will trial increased dose  Encouraged PRN pain medications  PO meds ordered  SCD's       ED Triage Vitals [08/21/21 0106]   Temperature Pulse Respirations Blood Pressure SpO2   97 5 °F (36 4 °C) (!) 52 20 112/51 98 %      Temp Source Heart Rate Source Patient Position - Orthostatic VS BP Location FiO2 (%)   Oral Monitor Sitting Left arm --      Pain Score       6          Wt Readings from Last 1 Encounters:   08/21/21 59 kg (130 lb 1 1 oz)     Additional Vital Signs:   Date/Time  Temp  Pulse  Resp  BP  MAP (mmHg)  SpO2  O2 Flow Rate (L/min)  O2 Device  Patient Position - Orthostatic VS   08/22/21 0758  --  --  --  --  --  100 %  --  None (Room air)  --   08/21/21 2000  --  --  --  --  --  --  --  None (Room air)  --   08/21/21 13:27:46  97 8 °F (36 6 °C)  69  19  130/83  99  98 %  --  --  --   08/21/21 1245  97 5 °F (36 4 °C)  76  20  111/60  --  98 %  --  None (Room air)  --   08/21/21 1230  --  64  20  111/60  --  98 %  --  None (Room air)  --   08/21/21 1215  97 °F (36 1 °C)Abnormal   72  20  116/53  --  100 %  --  None (Room air)  --   08/21/21 0445  --  74  --  89/50Abnormal   65  93 %  --  --  --   08/21/21 0400  --  74  18  95/52  68  93 %  --  --  --   08/21/21 0106  97 5 °F (36 4 °C)  52Abnormal   20  112/51  --  98 %  --  None (Room air)  Sitting       Pertinent Labs/Diagnostic Test Results:   CT renal stone study a/p 8/21 --- Bilateral nephrolithiasis with 4 x 3 x 6 mm calculus in the left mid ureter with associated moderate left hydronephrosis         Results from last 7 days   Lab Units 08/21/21  0138   WBC Thousand/uL 14 89*   HEMOGLOBIN g/dL 8 7*   HEMATOCRIT % 30 4*   PLATELETS Thousands/uL 371   NEUTROS ABS Thousands/µL 11 98*     Results from last 7 days   Lab Units 08/22/21  0527 08/21/21  0138   SODIUM mmol/L 138 139   POTASSIUM mmol/L 4 2 3 8   CHLORIDE mmol/L 105 105   CO2 mmol/L 28 28   ANION GAP mmol/L 5 6   BUN mg/dL 17 20   CREATININE mg/dL 0 60 0 65   EGFR ml/min/1 73sq m 110 107   CALCIUM mg/dL 8 7 8 7     Results from last 7 days   Lab Units 08/21/21  0138   AST U/L 11   ALT U/L 16   ALK PHOS U/L 58   TOTAL PROTEIN g/dL 6 6   ALBUMIN g/dL 3 4*   TOTAL BILIRUBIN mg/dL 0 22 Results from last 7 days   Lab Units 08/22/21  0527 08/21/21  0138   GLUCOSE RANDOM mg/dL 107 104       Results from last 7 days   Lab Units 08/21/21  0138   LIPASE u/L 114     Results from last 7 days   Lab Units 08/21/21  0557   CLARITY UA  Cloudy   COLOR UA  Dk Yellow   SPEC GRAV UA  1 024   PH UA  6 0   GLUCOSE UA mg/dl Negative   KETONES UA mg/dl Trace*   BLOOD UA  Large*   PROTEIN UA mg/dl 30 (1+)*   NITRITE UA  Negative   BILIRUBIN UA  Negative   UROBILINOGEN UA E U /dl 1 0   LEUKOCYTES UA  Small*   WBC UA /hpf 30-50*   RBC UA /hpf Innumerable*   BACTERIA UA /hpf None Seen   EPITHELIAL CELLS WET PREP /hpf None Seen     ED Treatment:   Medication Administration from 08/21/2021 0104 to 08/21/2021 1051       Date/Time Order Dose Route Action     08/21/2021 0138 HYDROmorphone (DILAUDID) injection 1 mg 1 mg Intravenous Given     08/21/2021 0347 lidocaine (PF) (XYLOCAINE-MPF) 1 % 89 mg in dextrose 5 % 50 mL IVPB 89 mg Intravenous New Bag     08/21/2021 0255 HYDROmorphone (DILAUDID) injection 1 mg 1 mg Intravenous Given     08/21/2021 0553 ondansetron (ZOFRAN) injection 4 mg 4 mg Intravenous Given     08/21/2021 0554 HYDROmorphone (DILAUDID) injection 1 mg 1 mg Intravenous Given     08/21/2021 1007 multivitamin-minerals (CENTRUM) tablet 1 tablet 0 tablet Oral Hold     08/21/2021 1007 pantoprazole (PROTONIX) EC tablet 40 mg 0 mg Oral Hold     08/21/2021 0932 multi-electrolyte (PLASMALYTE-A/ISOLYTE-S PH 7 4) IV solution 125 mL/hr Intravenous New Bag     08/21/2021 1016 ondansetron (ZOFRAN) injection 4 mg 4 mg Intravenous Given     08/21/2021 0949 ceftriaxone (ROCEPHIN) 1 g/50 mL in dextrose IVPB 1,000 mg Intravenous New Bag     08/21/2021 1016 HYDROmorphone (DILAUDID) injection 0 5 mg 0 5 mg Intravenous Given     Past Medical History:   Diagnosis Date    Abdominal pain     Brain condition     Pseudotumor Cerebri     Chronic kidney disease     De Quervain's disease (tenosynovitis)     Esophageal perforation  Gastric leak     GERD (gastroesophageal reflux disease)     Headache     Idiopathic intracranial hypertension     Kidney stone     Migraine     No blood products     per pt: personal and [employer] beliefs  surgeon office aware 12/13/19    Papilledema, both eyes     PONV (postoperative nausea and vomiting)     Postgastrectomy malabsorption     Presence of lumboperitoneal shunt     Resolved: Sep 20, 2017    Rotator cuff tendinitis     Resolved: Aug 23, 2017    Visual field defect      Present on Admission:   Renal calculi   Intractable pain      Admitting Diagnosis: Hydronephrosis [N13 30]  Nephrolithiasis [N20 0]  Renal calculi [N20 0]  Flank pain [R10 9]  Intractable pain [R52]  Left ureteral stone [N20 1]  Age/Sex: 54 y o  female  Admission Orders:  Scheduled Medications:  acetaminophen, 650 mg, Oral, Q6H [facility]  cefTRIAXone, 1,000 mg, Intravenous, Q24H  multivitamin-minerals, 1 tablet, Oral, Daily  pantoprazole, 40 mg, Oral, BID  phenazopyridine, 100 mg, Oral, TID With Meals  tamsulosin, 0 4 mg, Oral, Daily With Dinner      Continuous IV Infusions:  multi-electrolyte, 125 mL/hr, Intravenous, Continuous      PRN Meds:  HYDROmorphone, 0 5 mg, Intravenous, Q3H PRN 8/21 x3, 8/22 x2  ketorolac, 30 mg, Intravenous, Q6H PRN 8/21 x2, 8/22 x1  lidocaine, 1 patch, Topical, Daily PRN 8/22 x1  ondansetron, 4 mg, Intravenous, Q4H PRN 8/21 x3, 8/22 x2      Network Utilization Review Department  ATTENTION: Please call with any questions or concerns to [phone] and carefully listen to the prompts so that you are directed to the right person  All voicemails are confidential   Brenda Aguilar all requests for admission clinical reviews, approved or denied determinations and any other requests to dedicated fax number below belonging to the campus where the patient is receiving treatment   List of dedicated fax numbers for the Facilities:  FACILITY NAME UR FAX NUMBER   ADMISSION DENIALS (Administrative/Medical Necessity) [facility] (Maternity/NICU/Pediatrics) [address] [place] [address],[address]  [phone]   Cesar Crum Adonis Alberto [id number] [address] Tish Swift [phone]   [address] [facility] [phone]

## 2021-08-22 NOTE — CASE MANAGEMENT
Met with pt, explained CM program/CM role  LOS-1  OBSERVATIOn  Bundle-no  Resides fiance and lucas, house, 1 [address]  I PTA for ADL's/ambulation, drives, unemployed  PCP Dr Carly Choudhury  Hx SL VN  HX SL ARC  No DME  Denies MH illness, D&A abuse  'Primary contact Jennifer Elkins-[phone]  No POA/LW  Family will transport home    CM reviewed d/c planning process including the following: identifying help at home, patient preference for d/c planning needs, Discharge Lounge, [facility] Meds to Bed program, availability of treatment team to discuss questions or concerns patient and/or family may have regarding understanding medications and recognizing signs and symptoms once discharged  CM also encouraged patient to follow up with all recommended appointments after discharge  Patient advised of importance for patient and family to participate in managing patients medical well being  Patient/caregiver received discharge checklist  Content reviewed  Patient/caregiver encouraged to participate in discharge plan of care prior to discharge home

## 2021-08-22 NOTE — QUICK NOTE
Patient is POD # 1 1 cysto, litho and stent  Labs stable, WBC improved  Patient complaining of left flank pain and some nausea  Will currently on pyridium, will trial increased dose  Encouraged PRN pain medications  PO meds ordered        Patient will follow up in our office on Tuesday for stent removal

## 2021-08-22 NOTE — TELEPHONE ENCOUNTER
Patient is POD # 1 left cysto, litho and stent on string  String to be removed on Tuesday, 8/24    Please call patient and schedule removal

## 2021-08-22 NOTE — ASSESSMENT & PLAN NOTE
Iron: 22  Ferritin: 2  TIBC: 414  Iron Saturation 5%    Administer Venofer 300 mg  Follow up outpatient with PCP

## 2021-08-23 ENCOUNTER — TELEPHONE (OUTPATIENT)
Dept: UROLOGY | Facility: AMBULATORY SURGERY CENTER | Age: 46
End: 2021-08-23

## 2021-08-23 VITALS
HEIGHT: 63 IN | SYSTOLIC BLOOD PRESSURE: 113 MMHG | RESPIRATION RATE: 18 BRPM | HEART RATE: 60 BPM | WEIGHT: 130.07 LBS | BODY MASS INDEX: 23.05 KG/M2 | TEMPERATURE: 98.9 F | OXYGEN SATURATION: 98 % | DIASTOLIC BLOOD PRESSURE: 62 MMHG

## 2021-08-23 PROBLEM — R52 INTRACTABLE PAIN: Status: RESOLVED | Noted: 2021-08-21 | Resolved: 2021-08-23

## 2021-08-23 PROBLEM — R11.0 NAUSEA: Status: ACTIVE | Noted: 2021-08-23

## 2021-08-23 PROCEDURE — 99217 PR OBSERVATION CARE DISCHARGE MANAGEMENT: CPT | Performed by: INTERNAL MEDICINE

## 2021-08-23 RX ORDER — ACETAMINOPHEN 325 MG/1
650 TABLET ORAL EVERY 6 HOURS PRN
Qty: 90 TABLET | Refills: 0 | Status: SHIPPED | OUTPATIENT
Start: 2021-08-23 | End: 2021-09-01

## 2021-08-23 RX ORDER — ONDANSETRON 4 MG/1
4 TABLET, FILM COATED ORAL EVERY 8 HOURS PRN
Qty: 20 TABLET | Refills: 0 | Status: SHIPPED | OUTPATIENT
Start: 2021-08-23 | End: 2021-09-01

## 2021-08-23 RX ORDER — LIDOCAINE 50 MG/G
1 PATCH TOPICAL DAILY PRN
Qty: 10 PATCH | Refills: 0 | Status: CANCELLED | OUTPATIENT
Start: 2021-08-23

## 2021-08-23 RX ORDER — PHENAZOPYRIDINE HYDROCHLORIDE 200 MG/1
200 TABLET, FILM COATED ORAL
Qty: 10 TABLET | Refills: 0 | Status: SHIPPED | OUTPATIENT
Start: 2021-08-23 | End: 2021-09-01

## 2021-08-23 RX ADMIN — PANTOPRAZOLE SODIUM 40 MG: 40 TABLET, DELAYED RELEASE ORAL at 07:42

## 2021-08-23 RX ADMIN — IRON SUCROSE 300 MG: 20 INJECTION, SOLUTION INTRAVENOUS at 10:37

## 2021-08-23 RX ADMIN — PHENAZOPYRIDINE 200 MG: 100 TABLET ORAL at 07:42

## 2021-08-23 RX ADMIN — PHENAZOPYRIDINE 200 MG: 100 TABLET ORAL at 12:03

## 2021-08-23 RX ADMIN — HYDROCODONE BITARTRATE AND ACETAMINOPHEN 1 TABLET: 5; 325 TABLET ORAL at 05:08

## 2021-08-23 RX ADMIN — ACETAMINOPHEN 650 MG: 325 TABLET, FILM COATED ORAL at 12:03

## 2021-08-23 RX ADMIN — ACETAMINOPHEN 650 MG: 325 TABLET, FILM COATED ORAL at 05:08

## 2021-08-23 RX ADMIN — Medication 1 TABLET: at 08:43

## 2021-08-23 RX ADMIN — Medication 1000 MG: at 08:43

## 2021-08-23 RX ADMIN — SODIUM CHLORIDE, SODIUM GLUCONATE, SODIUM ACETATE, POTASSIUM CHLORIDE, MAGNESIUM CHLORIDE, SODIUM PHOSPHATE, DIBASIC, AND POTASSIUM PHOSPHATE 125 ML/HR: .53; .5; .37; .037; .03; .012; .00082 INJECTION, SOLUTION INTRAVENOUS at 05:09

## 2021-08-23 RX ADMIN — HYDROCODONE BITARTRATE AND ACETAMINOPHEN 1 TABLET: 5; 325 TABLET ORAL at 12:03

## 2021-08-23 RX ADMIN — ONDANSETRON 4 MG: 2 INJECTION INTRAMUSCULAR; INTRAVENOUS at 07:42

## 2021-08-23 NOTE — DISCHARGE INSTRUCTIONS
Ureteral Stent Placement   WHAT YOU NEED TO KNOW:   Ureteral stent placement is a procedure to open a blocked or narrow ureter  The ureter is the tube that carries urine from your kidney into your bladder  A stent is a thin hollow plastic tube used to hold your ureter open and allow urine to flow  The stent may stay in for several weeks  Long-term stents will stay in longer and need to be replaced within a certain period of time  DISCHARGE INSTRUCTIONS:   Seek care immediately if:   · You urinate very little or not at all  · You have severe pain in your abdomen, even after you take medicine  · You have heavy bleeding from your urethra  · You see large blood clots in your urine, or your urine is bright red  Contact your healthcare provider or urologist if:   · You have a fever or chills  · You feel like you need to urinate very often  · Your symptoms get worse, or you develop new symptoms  · You have questions or concerns about your condition or care  Medicines: You may  need any of the following:  · Acetaminophen  decreases pain and fever  It is available without a doctor's order  Ask how much to take and how often to take it  Follow directions  Read the labels of all other medicines you are using to see if they also contain acetaminophen, or ask your doctor or pharmacist  Acetaminophen can cause liver damage if not taken correctly  Do not use more than 4 grams (4,000 milligrams) total of acetaminophen in one day  · Prescription pain medicine  may be given  Ask your healthcare provider how to take this medicine safely  Some prescription pain medicines contain acetaminophen  Do not take other medicines that contain acetaminophen without talking to your healthcare provider  Too much acetaminophen may cause liver damage  Prescription pain medicine may cause constipation  Ask your healthcare provider how to prevent or treat constipation       · Antibiotics  help prevent or treat bacterial infections  Your healthcare provider may prescribe these for you while you have a ureteral stent  · Take your medicine as directed  Contact your healthcare provider if you think your medicine is not helping or if you have side effects  Tell him or her if you are allergic to any medicine  Keep a list of the medicines, vitamins, and herbs you take  Include the amounts, and when and why you take them  Bring the list or the pill bottles to follow-up visits  Carry your medicine list with you in case of an emergency  Self-care:   · Drink liquids as directed  Liquids will help flush your urinary tract and prevent infection  Ask your healthcare provider how much liquid to drink each day and which liquids are best for you  · Ask when you can return to daily activities  You may be able to return to normal activities the day after your stent placement  Follow up with your urologist as directed: You will need regular follow-up visits with your urologist as long as you have a stent  He or she will check to make sure the stent is working properly  He or she will remove your temporary stent in several weeks  Your provider may do urine cultures to check for infection  Write down your questions so you remember to ask them during your visits  © Copyright [employer] 2021 Information is for End User's use only and may not be sold, redistributed or otherwise used for commercial purposes  All illustrations and images included in CareNotes® are the copyrighted property of A D A M , Inc  or Carter Whittaker  The above information is an  only  It is not intended as medical advice for individual conditions or treatments  Talk to your doctor, nurse or pharmacist before following any medical regimen to see if it is safe and effective for you

## 2021-08-23 NOTE — TELEPHONE ENCOUNTER
Post Op Note    John Marr is a 54 y o  female s/p CYSTOSCOPY; LEFT URETEROSCOPY WITH RETROGRADE PYELOGRAM, REMOVAL OF STONE AND INSERTION LEFT URETERAL STENT (Left Bladder) performed 08/21/2021  John Marr is a patient of Dr Wing Greenwood and is seen at the [facility] office  How would you rate your pain on a scale from 1 to 10, 10 being the worst pain ever? Minor pain    Have you had a fever? No    Have your bowel movements been regular? No    Do you have any difficulty urinating? No    Do you have any other questions or concerns that I can address at this time? Patient stated she will be willing to remove stent at home  Education provided on how to remove it  Advised after removal, she can call the office to notify the staff  Advised I will contact the patient in the afternoon to follow up on how she is doing  Advised patient can call the office if she is having difficulty removing the stent   Education provided regarding symptoms she may experience after removal

## 2021-08-23 NOTE — DISCHARGE SUMMARY
INTERNAL MEDICINE RESIDENCY DISCHARGE SUMMARY     Isatu Jackson   54 y o  female  MRN: [id number]  Room/Bed: /MS [address] MED SURG 4   Encounter: [id number]    Principal Problem:    Renal calculi  Active Problems:    Refusal of blood product    Gastroesophageal reflux disease    Anemia    Nausea      Anemia  Assessment & Plan  Iron: 22  Ferritin: 2  TIBC: 414  Iron Saturation 5%    Administer Venofer 300 mg  Follow up outpatient with PCP      Gastroesophageal reflux disease  Assessment & Plan  Pantoprazole 40 mg BID    * Renal calculi  Assessment & Plan  Patient has history of recurrent stones, previous stone analysis demonstrated calcium oxalate  Family history of stones in mother  Patient is afebrile hemodynamically stable and house left-sided costo vertebral tenderness    CT demonstrates 4 x 3 x 6 mm stone in the left mid ureter  UA negative for nitrites or bacteria, positive and innumerable RBCs  S/p left ureteroscopy with retrograde pyelogram, removal of stone and insertion of left ureteral stent with no complications  Discontinued IV ceftriaxone, discharge on 5-day course of Keflex    WBC 14 89 --> 8 4 8/22/21, WNL  IV fluids isolate 125 cc  Urology following, appreciate recommendations  Currently on pyridium, will trial increased dose        Zofran prn changed to Q4H scheduled due to Nausea and vomiting  Tylenol changed to Q6H scheduled due to severe pain   Prn Dilaudid, Toradol and lidocaine patch for pain control    Intractable pain-resolved as of 8/23/2021  Assessment & Plan  10/10 pain --> 9/10 pain after stone removal and stent placement, stent will be removed on Tuesday at outpatient urology visit     Discharge with tylenol    Tylenol changed to Q6H scheduled overnight due to severe pain   Prn Dilaudid, Toradol and lidocaine patch for pain control    Urology following, appreciate recommendations:   -Currently on pyridium, will trial increased dose      [address] Course:  Patient is 54 y o  female with a history of pseudotumor cerebri with  shunt, gastric sleeve placement with leak and reoperations due to complications, titanium padlock on stomach, and nephrolithiasis and ureteral calculi  She has passed 1 stone spontaneously in her lifetime, but otherwise has had numerous calculi requiring intervention  She underwent ureteroscopy last year  She reports onset of flank pain about a week ago and she figured that this was a stone  She began to hydrate well with lemon water  Nevertheless she developed severe renal colic overnight and presented to the emergency department  She had nausea and vomiting  She denied fever or chills  CT revealed bilateral nephrolithiasis with 4 x 3 x 6 mm calculus in the left mid ureter with associated moderate left hydronephrosis  Urology consulted  Patient was taken to the OR and had a left ureteroscopy with retrograde pyelogram, removal of stone and insertion of left ureteral stent with no complications (POD #2)  Patient was worked up for anemia  Iron was 22, Ferritin was 2, TIBC was 414, and iron saturation was 5%  Venofer 300 mg IV administered for her iron deficiency anemia  She will follow up with her PCP outpatient and should discuss setting up outpatient iron infusions since oral iron may not be absorbed well due to her bariatric surgery  At this time, the patient is clinically stable and ready for discharge  Prescribed 5-day course of oral Keflex  Patient will follow-up with Urology outpatient for removal of the stent on 8/24/21  Office will call to arrange appointment  Patient should follow-up with her PCP within the next 1-2 weeks  Subjective:  Patient seen and examined  No acute events overnight  Patient does have some nausea which has improved since yesterday and no episodes of vomiting today   She also reports improved abdominal pain which she rates as 3 or 4 out of 10 compared to 9/10 yesterday  Patient denies F/C/V, CP, SOB, diarrhea, or urinary symptoms  Vitals:    08/22/21 1441 08/22/21 1918 08/22/21 2245 08/23/21 0639   BP: 122/77 124/68 124/70 113/62   BP Location:    Left arm   Pulse: 61 70 66 60   Resp: 14 16 16 18   Temp: 98 8 °F (37 1 °C) 98 9 °F (37 2 °C) 98 6 °F (37 °C) 98 9 °F (37 2 °C)   TempSrc:    Oral   SpO2: 99% 99% 96% 98%   Weight:       Height:         Physical Exam  Vitals reviewed  Constitutional:       General: She is not in acute distress  Appearance: She is not ill-appearing  HENT:      Head: Normocephalic and atraumatic  Mouth/Throat:      Mouth: Mucous membranes are moist       Pharynx: Oropharynx is clear  Eyes:      General: No scleral icterus  Conjunctiva/sclera: Conjunctivae normal    Cardiovascular:      Rate and Rhythm: Normal rate and regular rhythm  Pulses: Normal pulses  Heart sounds: Normal heart sounds  Pulmonary:      Effort: Pulmonary effort is normal       Breath sounds: Normal breath sounds  Abdominal:      General: Abdomen is flat  Bowel sounds are normal       Palpations: Abdomen is soft  Tenderness: There is abdominal tenderness (LLQ)  There is no right CVA tenderness, left CVA tenderness, guarding or rebound  Musculoskeletal:      Cervical back: Normal range of motion and neck supple  Right lower leg: No edema  Left lower leg: No edema  Lymphadenopathy:      Cervical: No cervical adenopathy  Skin:     General: Skin is warm and dry  Coloration: Skin is not jaundiced  Neurological:      General: No focal deficit present  Mental Status: She is alert and oriented to person, place, and time     Psychiatric:         Mood and Affect: Mood normal          Behavior: Behavior normal          DISCHARGE INFORMATION     PCP at Discharge: Gonzalez Valdovinos MD    Admitting Provider: Camden Green DO  Admission Date: 8/21/2021    Discharge Provider: Meche Posadas MD  Discharge Date: 8/23/2021    Discharge Disposition: Home/Self Care  Discharge Condition: stable  Discharge with Lines: no    Discharge Diet: regular diet  Activity Restrictions: none  Test Results Pending at Discharge: stone analysis    Discharge Diagnoses:  Principal Problem:    Renal calculi  Active Problems:    Refusal of blood product    Gastroesophageal reflux disease    Anemia    Nausea  Resolved Problems:    Intractable pain      Consulting Providers:  Urology - Marylou Romero MD    Diagnostic & Therapeutic Procedures Performed:  CT renal stone study abdomen pelvis without contrast    Result Date: 8/21/2021  Impression: Bilateral nephrolithiasis with 4 x 3 x 6 mm calculus in the left mid ureter with associated moderate left hydronephrosis   Workstation performed: [id number]       Code Status: Level 1 - Full Code    Medications:  Current Discharge Medication List        Current Discharge Medication List        Current Discharge Medication List      CONTINUE these medications which have NOT CHANGED    Details   calcium citrate-vitamin D (CITRACAL+D) 315-200 MG-UNIT per tablet Take 1 tablet by mouth 2 (two) times a day      Multiple Vitamin (MULTIVITAMIN) tablet Take 1 tablet by mouth daily Wears a patch      NON FORMULARY       pantoprazole (PROTONIX) 40 mg tablet Take 1 tablet (40 mg total) by mouth 2 (two) times a day  Qty: 180 tablet, Refills: 0    Associated Diagnoses: Gastroesophageal reflux disease, unspecified whether esophagitis present      dexlansoprazole (Dexilant) 60 MG capsule Take 1 capsule (60 mg total) by mouth daily  Qty: 30 capsule, Refills: 2    Associated Diagnoses: Heart burn      Galcanezumab-gnlm 120 MG/ML SOAJ Inject 1 pen under the skin every 30 (thirty) days  Qty: 1 mL, Refills: 11    Associated Diagnoses: Migraine with aura and without status migrainosus, not intractable      indomethacin (INDOCIN) 25 mg capsule Take 1 capsule (25 mg total) by mouth 3 (three) times a day with meals  Qty: 15 capsule, Refills: 0    Associated Diagnoses: History of idiopathic intracranial hypertension      sucralfate (CARAFATE) 1 g tablet Take 1 tablet (1 g total) by mouth 3 (three) times a day as needed (headache-take before indomethacin)  Qty: 15 tablet, Refills: 0    Associated Diagnoses: History of idiopathic intracranial hypertension             Allergies: Allergies   Allergen Reactions    Benadryl [Diphenhydramine] Anaphylaxis     Throat closing    Phenergan [Promethazine] Anaphylaxis       FOLLOW-UP     PCP Outpatient Follow-up:  yes      Follow up: Cheyanne Garcia DO  Location: [address] [place] [address] Primary Care  Follow up within next: 1 week    Consulting Providers Follow-up:  yes      Specialty: Urology  Office phone number: [phone]  Date and time: 8/24/2021     Active Issues Requiring Follow-up:   yes     Issue: stent removal  Responsible Individual: patient  What is Needed: office will reach out to schedule appt for stent removal on Tuesday 8/24/21  Follow-up Appointments Arranged: yes      Discharge Statement:   I spent 45 minutes minutes discharging the patient  This time was spent on the day of discharge  I had direct contact with the patient on the day of discharge  Additional documentation is required if more than 30 minutes were spent on discharge  Portions of the record may have been created with voice recognition software  Occasional wrong word or "sound a like" substitutions may have occurred due to the inherent limitations of voice recognition software    Read the chart carefully and recognize, using context, where substitutions have occurred     ==  Alice Sutherland MD  [address]  Internal Medicine Resident PGY-1

## 2021-08-23 NOTE — TREATMENT PLAN
58-year-old female with history of recurrent previously demonstrated calcium oxalate stones admitted with left-sided costovertebral pain  CT demonstrated 4 x 3 x 6 mm stone in left ureter   Two status post left you reach her os copy with retry great pyelogram, removal of stone and insertion of left ureteral stent with no complication  WBC went from 14 89-8 4   she received IV ceftriaxone and will be discharged home on a 5 day course of Keflex  She will follow-up with urology This week  additionally she is status post gastric bypass surgery and has anemia with an iron saturation of 5% and a ferritin of 2     She was administered 300 mg IV Venofer x1 and will follow-up with her PCP in 1 week

## 2021-08-24 ENCOUNTER — PROCEDURE VISIT (OUTPATIENT)
Dept: UROLOGY | Facility: AMBULATORY SURGERY CENTER | Age: 46
End: 2021-08-24
Payer: COMMERCIAL

## 2021-08-24 VITALS
SYSTOLIC BLOOD PRESSURE: 90 MMHG | BODY MASS INDEX: 24.66 KG/M2 | HEART RATE: 60 BPM | DIASTOLIC BLOOD PRESSURE: 60 MMHG | HEIGHT: 63 IN | WEIGHT: 139.2 LBS

## 2021-08-24 DIAGNOSIS — N20.0 CALCULUS OF KIDNEY: Primary | ICD-10-CM

## 2021-08-24 PROCEDURE — 52310 CYSTOSCOPY AND TREATMENT: CPT | Performed by: UROLOGY

## 2021-08-24 NOTE — LETTER
August 24, 2021     Lena Winston MD  [address] [facility]  [address] [address]    Patient: Sveta Sarmiento   YOB: 1975   Date of Visit: 8/24/2021       Dear Dr Jose Miguel Antoine:    Thank you for referring Anjelica Granados to me for evaluation  Below are my notes for this consultation  If you have questions, please do not hesitate to call me  I look forward to following your patient along with you  Sincerely,        Lindell Pallas, MD        CC: No Recipients  Lindell Pallas, MD  8/24/2021 11:44 AM  Signed     Cystoscopy     Date/Time 8/24/2021 11:25 AM     Performed by  Lindell Pallas, MD     Authorized by Lindell Pallas, MD          Procedure Details:  Procedure type: simple removal of a foreign body, stone, or stent         Shweta  Is a 58-year-old female who recently underwent left ureteroscopy with Dr Jose Miguel Antoine   This was performed on August 21, 2021  She presents today for stent removal   A string was left in place  She denies stent passage  The nursing staff was unable to identify a string  She states that she is currently on antibiotics which were prescribed after her initial ureteroscopy by Dr Jose Miguel Antoine   She is unsure which medication she is taking at this time  As the nursing staff was unable to remove the stent I recommended cystoscopy  Consent was obtained  A female staff member was present  The patient was placed in a modified supine frog-leg position  I inspected the urethra  In could not identify a string  I therefore recommended proceeding with cystoscopy  Cystoscopy Procedure note    Risk and benefits of flexible cystoscopy were discussed  Informed consent was obtained  A urine dip is adequate for cystoscopy  The patient was placed into the modified supine position  Her genitalia was prepped and draped in a sterile fashion  Viscous lidocaine was instilled into the urethra  Flexible cystoscopy was then performed  The stent was visualized coming from the left ureteral orifice  The string had retracted into the bladder  The stent was grasped and removed without difficulty  Impression:  History of left ureteral calculus status post left ureteroscopy with holmium laser lithotripsy,  Status post cystoscopy with stent removal in the office today  Plan: I recommend that she complete her antibiotics as prescribed  Follow-up will be in the next 8-12 weeks with advanced practitioner with a KUB

## 2021-08-24 NOTE — PROGRESS NOTES
Cystoscopy     Date/Time 8/24/2021 11:25 AM     Performed by  Germain Sun MD     Authorized by Germain Sun MD          Procedure Details:  Procedure type: simple removal of a foreign body, stone, or stent         Shweta  Is a 50-year-old female who recently underwent left ureteroscopy with Dr Clarita Bartlett   This was performed on August 21, 2021  She presents today for stent removal   A string was left in place  She denies stent passage  The nursing staff was unable to identify a string  She states that she is currently on antibiotics which were prescribed after her initial ureteroscopy by Dr Clarita Bartlett   She is unsure which medication she is taking at this time  As the nursing staff was unable to remove the stent I recommended cystoscopy  Consent was obtained  A female staff member was present  The patient was placed in a modified supine frog-leg position  I inspected the urethra  In could not identify a string  I therefore recommended proceeding with cystoscopy  Cystoscopy Procedure note    Risk and benefits of flexible cystoscopy were discussed  Informed consent was obtained  A urine dip is adequate for cystoscopy  The patient was placed into the modified supine position  Her genitalia was prepped and draped in a sterile fashion  Viscous lidocaine was instilled into the urethra  Flexible cystoscopy was then performed  The stent was visualized coming from the left ureteral orifice  The string had retracted into the bladder  The stent was grasped and removed without difficulty  Impression:  History of left ureteral calculus status post left ureteroscopy with holmium laser lithotripsy,  Status post cystoscopy with stent removal in the office today  Plan: I recommend that she complete her antibiotics as prescribed  Follow-up will be in the next 8-12 weeks with advanced practitioner with a KUB

## 2021-08-24 NOTE — TELEPHONE ENCOUNTER
Called Shweta back and she cant find the string  Patient anxious   Told her shcould come into the office and will retrieve- scheduled for 11:00 today

## 2021-08-24 NOTE — UTILIZATION REVIEW
Notification of Discharge   This is a Notification of Discharge from our facility [address]  Please be advised that this patient has been discharge from our facility  Below you will find the admission and discharge date and time including the patients disposition  UTILIZATION REVIEW CONTACT:  Koffi Adan  Utilization   Network Utilization Review Department  Phone: [phone] x carefully listen to the prompts  All voicemails are confidential   Email: [email or web address]     PHYSICIAN ADVISORY SERVICES:  FOR [id number]-HR-ARKW REVIEW - MEDICAL NECESSITY DENIAL  Phone: [phone]  Fax: [phone]  Email: [email or web address]     PRESENTATION DATE: 8/21/2021  1:04 AM  OBERVATION ADMISSION DATE:   INPATIENT ADMISSION DATE: N/A N/A   DISCHARGE DATE: 8/23/2021  1:41 PM  DISPOSITION: Home/Self Care Home/Self Care      IMPORTANT INFORMATION:  Send all requests for admission clinical reviews, approved or denied determinations and any other requests to dedicated fax number below belonging to the campus where the patient is receiving treatment   List of dedicated fax numbers:  [address] DENIALS (Administrative/Medical Necessity) [phone]   [address] [place] [address] (Maternity/NICU/Pediatrics) [phone]   Carmen Hurst [phone]   Jairon King [phone]   Andrew Mantilla [phone]   [facility] [address] [place] [address] [phone]   [facility]  [phone]   [address] [place] [address], [facility]  [address] [facility] [address] [facility] [phone]

## 2021-08-25 ENCOUNTER — TRANSITIONAL CARE MANAGEMENT (OUTPATIENT)
Dept: INTERNAL MEDICINE CLINIC | Age: 46
End: 2021-08-25

## 2021-08-26 LAB
CALCIUM OXALATE DIHYDRATE MFR STONE IR: 40 %
COLOR STONE: NORMAL
COM MFR STONE: 60 %
COMMENT-STONE3: NORMAL
COMPOSITION: NORMAL
LABORATORY COMMENT REPORT: NORMAL
PHOTO: NORMAL
SIZE STONE: NORMAL MM
SPEC SOURCE SUBJ: NORMAL
STONE ANALYSIS-IMP: NORMAL
WT STONE: 33 MG

## 2021-09-01 ENCOUNTER — OFFICE VISIT (OUTPATIENT)
Dept: INTERNAL MEDICINE CLINIC | Facility: CLINIC | Age: 46
End: 2021-09-01
Payer: COMMERCIAL

## 2021-09-01 VITALS
BODY MASS INDEX: 24.34 KG/M2 | TEMPERATURE: 99.6 F | DIASTOLIC BLOOD PRESSURE: 68 MMHG | HEART RATE: 64 BPM | WEIGHT: 137.4 LBS | SYSTOLIC BLOOD PRESSURE: 110 MMHG | HEIGHT: 63 IN | RESPIRATION RATE: 18 BRPM

## 2021-09-01 DIAGNOSIS — F43.10 PTSD (POST-TRAUMATIC STRESS DISORDER): ICD-10-CM

## 2021-09-01 DIAGNOSIS — N13.30 HYDROURETERONEPHROSIS: ICD-10-CM

## 2021-09-01 DIAGNOSIS — Z12.31 ENCOUNTER FOR SCREENING MAMMOGRAM FOR MALIGNANT NEOPLASM OF BREAST: Primary | ICD-10-CM

## 2021-09-01 DIAGNOSIS — N20.0 RENAL CALCULI: ICD-10-CM

## 2021-09-01 DIAGNOSIS — D50.8 OTHER IRON DEFICIENCY ANEMIA: ICD-10-CM

## 2021-09-01 PROBLEM — R07.89 LEFT-SIDED CHEST WALL PAIN: Status: RESOLVED | Noted: 2018-03-06 | Resolved: 2021-09-01

## 2021-09-01 PROBLEM — R19.7 DIARRHEA: Status: RESOLVED | Noted: 2019-01-10 | Resolved: 2021-09-01

## 2021-09-01 PROBLEM — K65.0 ACUTE PERITONITIS (HCC): Status: RESOLVED | Noted: 2018-01-24 | Resolved: 2021-09-01

## 2021-09-01 PROBLEM — T81.30XA ABDOMINAL WOUND DEHISCENCE: Status: RESOLVED | Noted: 2018-03-05 | Resolved: 2021-09-01

## 2021-09-01 PROBLEM — D72.9 NEUTROPHILIC LEUKOCYTOSIS: Status: RESOLVED | Noted: 2018-03-05 | Resolved: 2021-09-01

## 2021-09-01 PROBLEM — T85.730D: Status: RESOLVED | Noted: 2018-01-24 | Resolved: 2021-09-01

## 2021-09-01 PROBLEM — R11.10 REGURGITATION OF FOOD: Status: RESOLVED | Noted: 2019-09-20 | Resolved: 2021-09-01

## 2021-09-01 PROBLEM — R11.0 NAUSEA: Status: RESOLVED | Noted: 2021-08-23 | Resolved: 2021-09-01

## 2021-09-01 PROBLEM — T81.321A ABDOMINAL WOUND DEHISCENCE: Status: RESOLVED | Noted: 2018-03-05 | Resolved: 2021-09-01

## 2021-09-01 PROBLEM — K65.1 POSTOPERATIVE INTRA-ABDOMINAL ABSCESS (HCC): Status: RESOLVED | Noted: 2018-03-05 | Resolved: 2021-09-01

## 2021-09-01 PROBLEM — R51.0 POSITIONAL HEADACHE: Status: RESOLVED | Noted: 2019-03-08 | Resolved: 2021-09-01

## 2021-09-01 PROBLEM — T81.43XA POSTOPERATIVE INTRA-ABDOMINAL ABSCESS: Status: RESOLVED | Noted: 2018-03-05 | Resolved: 2021-09-01

## 2021-09-01 PROBLEM — D72.828 NEUTROPHILIC LEUKOCYTOSIS: Status: RESOLVED | Noted: 2018-03-05 | Resolved: 2021-09-01

## 2021-09-01 PROCEDURE — 99495 TRANSJ CARE MGMT MOD F2F 14D: CPT | Performed by: INTERNAL MEDICINE

## 2021-09-01 PROCEDURE — 1111F DSCHRG MED/CURRENT MED MERGE: CPT | Performed by: INTERNAL MEDICINE

## 2021-09-01 NOTE — ASSESSMENT & PLAN NOTE
Counseled patient, provider emotional support  I recommended she start seeing a behavioral health therapist to which she will consider  Defer on starting SSRI/SNRI therapy at this time

## 2021-09-01 NOTE — ASSESSMENT & PLAN NOTE
Recommended she start taking ferrous gluconate 324 mg daily  Advised she follow up with her bariatric surgeon to which she has an appointment later this month  Will recheck CBC and iron panel in 3 months to determine if she is adequately absorbing oral iron given bariatric status and if need for iron infusions

## 2021-09-01 NOTE — PROGRESS NOTES
Assessment/Plan:    Renal calculi  Completed antibiotic therapy  She has follow-up scheduled with Urology  Discussed adequate oral hydration  Treating with hydrochlorothiazide for prevention is not an option as her blood pressure is already at lower limits of normal   Discussed taking adequate calcium, vitamin-C, and vitamin-D supplementation  PTSD (post-traumatic stress disorder)  Counseled patient, provider emotional support  I recommended she start seeing a behavioral health therapist to which she will consider  Defer on starting SSRI/SNRI therapy at this time  Anemia  Recommended she start taking ferrous gluconate 324 mg daily  Advised she follow up with her bariatric surgeon to which she has an appointment later this month  Will recheck CBC and iron panel in 3 months to determine if she is adequately absorbing oral iron given bariatric status and if need for iron infusions  Diagnoses and all orders for this visit:    Encounter for screening mammogram for malignant neoplasm of breast  -     Mammo screening bilateral w cad; Future    Renal calculi    Hydroureteronephrosis    PTSD (post-traumatic stress disorder)    Other iron deficiency anemia  -     CBC; Future  -     Iron Panel (Includes Ferritin, Iron Sat%, Iron, and TIBC); Future                  Subjective:      Patient ID: Leona Pete is a 54 y o  female  Chief Complaint   Patient presents with    Transition of Care Management     tcm d/c slb 8/23 renal calculi  feeling ok since she is home  no issues or concerns    health maintenace     mammo, annual exam, phq, hep c       50-year-old female is seen today for transition of care to which she was hospitalized at [facility] from 08/21 until 08/23/2021 following left-sided renal calculi  Hospitalization and discharge summary reviewed today  She has a history of multiple nephrolithiasis    She presented with left flank pain and CT revealed bilateral nephrolithiasis with the largest on the left mid ureter with left side hydronephrosis  Urology was consulted and she underwent left ureteroscopy with retrograde pyelogram and removal of stone with insertion of left ureteral stent  She tolerated procedure well  She was discharged on Keflex to which she has completed  She was also found to have iron deficiency anemia with an iron of 22, ferritin of 2, and TIBC of 414  She was given Venofer IV  She has a history of gastric bypass procedure  She follows up with her bariatric surgeon regularly  She currently does not take any oral iron supplementation  TCM Call (since 8/1/2021)     Date and time call was made  8/25/2021  3:40 PM    Hospital care reviewed  Records reviewed    [facility]        Date of Admission  08/21/21    Date of discharge  08/23/21    Diagnosis  renal calculi    Disposition  Home    Were the patients medications reviewed and updated  Yes      TCM Call (since 8/1/2021)     None          The following portions of the patient's history were reviewed and updated as appropriate: allergies, current medications, past family history, past medical history, past social history, past surgical history and problem list     Review of Systems   Constitutional: Negative for activity change, appetite change, chills, diaphoresis, fatigue and fever  HENT: Negative for congestion, postnasal drip, rhinorrhea, sinus pressure, sinus pain, sneezing and sore throat  Eyes: Negative for visual disturbance  Respiratory: Negative for apnea, cough, choking, chest tightness, shortness of breath and wheezing  Cardiovascular: Negative for chest pain, palpitations and leg swelling  Gastrointestinal: Negative for abdominal distention, abdominal pain, anal bleeding, blood in stool, constipation, diarrhea, nausea and vomiting  Endocrine: Negative for cold intolerance and heat intolerance     Genitourinary: Negative for difficulty urinating, dysuria and hematuria  Musculoskeletal: Negative  Skin: Negative  Neurological: Negative for dizziness, weakness, light-headedness, numbness and headaches  Hematological: Negative for adenopathy  Psychiatric/Behavioral: Negative for agitation, sleep disturbance and suicidal ideas  All other systems reviewed and are negative  Past Medical History:   Diagnosis Date    Abdominal pain     Brain condition     Pseudotumor Cerebri     Chronic kidney disease     De Quervain's disease (tenosynovitis)     Esophageal perforation     Gastric leak     GERD (gastroesophageal reflux disease)     Headache     Idiopathic intracranial hypertension     Kidney stone     Migraine     No blood products     per pt: personal and Yazidi beliefs   surgeon office aware 12/13/19    Papilledema, both eyes     PONV (postoperative nausea and vomiting)     Postgastrectomy malabsorption     Presence of lumboperitoneal shunt     Resolved: Sep 20, 2017    Rotator cuff tendinitis     Resolved: Aug 23, 2017    Visual field defect          Current Outpatient Medications:     calcium citrate-vitamin D (CITRACAL+D) 315-200 MG-UNIT per tablet, Take 1 tablet by mouth 2 (two) times a day, Disp: , Rfl:     Multiple Vitamin (MULTIVITAMIN) tablet, Take 1 tablet by mouth daily Wears a patch, Disp: , Rfl:     NON FORMULARY, , Disp: , Rfl:     pantoprazole (PROTONIX) 40 mg tablet, Take 1 tablet (40 mg total) by mouth 2 (two) times a day, Disp: 180 tablet, Rfl: 0    Allergies   Allergen Reactions    Benadryl [Diphenhydramine] Anaphylaxis     Throat closing    Phenergan [Promethazine] Anaphylaxis       Social History   Past Surgical History:   Procedure Laterality Date    CSF SHUNT      LP shunt - 2015 -  shunt - 2017    ESOPHAGOGASTRODUODENOSCOPY N/A 2/23/2018    Procedure: ESOPHAGOGASTRODUODENOSCOPY (EGD) WITH ESOPHAGEAL STENT PLACEMENT;  Surgeon: Anni Garcia MD;  [facility]; Service: Thoracic    ESOPHAGOGASTRODUODENOSCOPY N/A 2/23/2018    Procedure: ESOPHAGOGASTRODUODENOSCOPY (EGD) WITH REMOVAL ESOPHAGEAL STENT  AND REPLACEMENT WITH 23mm X155mm [address];  Surgeon: Maricarmen Dowd MD;  [facility];  Service: Thoracic    ESOPHAGOGASTRODUODENOSCOPY N/A 3/28/2018    Procedure: ESOPHAGOGASTRODUODENOSCOPY (EGD) with PEJ placement ;  Surgeon: Iftikhar Juarez MD;  [facility]; Service: Gastroenterology    ESOPHAGOGASTRODUODENOSCOPY N/A 4/5/2018    Procedure: ESOPHAGOGASTRODUODENOSCOPY (EGD) with botox injection and kaofed placement;  Surgeon: Oriana Helton DO;  [facility]; Service: Gastroenterology    ESOPHAGOGASTRODUODENOSCOPY N/A 4/10/2018    Procedure: ESOPHAGOGASTRODUODENOSCOPY (EGD) with Kaofed placement;  Surgeon: Sebastian Santana MD;  [facility]; Service: Gastroenterology    ESOPHAGOSCOPY WITH STENT INSERTION N/A 1/24/2018    Procedure: INSERTION STENT ESOPHAGEAL;  Surgeon: Aislinn Deluna MD;  [facility]; Service: Gastroenterology    EYE SURGERY Bilateral 1980    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)     FL RETROGRADE PYELOGRAM  6/24/2020    FL RETROGRADE PYELOGRAM  8/21/2021    GASTRIC BYPASS  12/19/2016    Lap sleeve gastrectomy w/shunt length shortening procedure    HIATAL HERNIA REPAIR      HYSTERECTOMY  03/14/2013    IR LUMBAR PUNCTURE  8/29/2018    LAPAROTOMY N/A 1/25/2018    Procedure: Exploratory Laparotomy, wash out,placement of drains, placement of NG feeding tube ;   Surgeon: Migue Thrasher DO;  [facility];  Service: General    LUMBAR PERITONEAL SHUNT  11/19/2015    Laparoscopic assisted    AZ CREATE SHUNT:VENTRIC-ATRIAL Right 12/18/2019    Procedure: IMAGE GUIDED INSERTION OF RIGHT CORONAL VENTRICULAR-ATRIAL SHUNT;  Surgeon: Adolphus Boas, MD;  [facility];  Service: Neurosurgery    AZ CREATE SHUNT:VENTRIC-PERITONEAL Right 5/31/2017    Procedure: IMAGE GUIDED CORONAL PLACEMENT OF PROGRAMABLE VENTRICULAR-PERITONEAL SHUNT, REMOVAL OF LP SHUNT ;  Surgeon: Devin Kelley MD;  [facility];  Service: Neurosurgery    WV CYSTO/URETERO W/LITHOTRIPSY &INDWELL STENT INSRT Bilateral 6/24/2020    Procedure: CYSTOSCOPY URETEROSCOPY WITH LITHOTRIPSY HOLMIUM LASER, RETROGRADE PYELOGRAM AND exchange bilateral  STENTs URETERAL;  Surgeon: Jose Ramon Paz MD;  [facility];  Service: Urology    WV CYSTO/URETERO W/LITHOTRIPSY &INDWELL STENT INSRT Left 8/21/2021    Procedure: CYSTOSCOPY; LEFT URETEROSCOPY WITH RETROGRADE PYELOGRAM, REMOVAL OF STONE AND INSERTION LEFT URETERAL STENT;  Surgeon: Milena Trujillo MD;  [facility];  Service: Urology    WV CYSTOURETHROSCOPY,URETER CATHETER N/A 6/6/2020    Procedure: Bilateral CYSTOSCOPY RETROGRADE PYELOGRAM WITH INSERTION STENT URETERAL;  Surgeon: Pamela Christie MD;  Location[facility] MAIN OR;  Service: Urology    WV EGD TRANSORAL BIOPSY SINGLE/MULTIPLE N/A 6/27/2018    Procedure: ESOPHAGOGASTRODUODENOSCOPY (EGD) with padlock clip placement;  Surgeon: Cathy Santos MD;  [facility];   Service: [place] CSF SHUNT,W/O REPLACE Right 2/5/2018    Procedure: Removal of  shunt;  Surgeon: Devin Kelley MD;  [facility];  Service: Neurosurgery    WV REPLACEMENT/REVISION,CSF SHUNT Right 1/25/2018    Procedure: Externalization of right-sided SHUNT VENTRICULAR-PERITONEAL in anterior chest wall ribs two and three level  ;  Surgeon: Richard Waldrop MD;  [facility];  Service: Neurosurgery    WRIST SURGERY Right     x3 2006, 2008     Family History   Problem Relation Age of Onset    No Known Problems Mother     No Known Problems Father     Thyroid cancer Brother     Colon cancer Maternal Grandfather     Cancer Paternal Grandmother     Cancer Paternal Grandfather     Cancer Family         Gastric    Leukemia Family     Colon cancer Family     Pancreatic cancer Family Objective:  /68 (BP Location: Left arm, Patient Position: Sitting, Cuff Size: Standard)   Pulse 64   Temp 99 6 °F (37 6 °C) (Temporal)   Resp 18   Ht 5' 3" (1 6 m)   Wt 62 3 kg (137 lb 6 4 oz)   LMP  (LMP Unknown)   BMI 24 34 kg/m²     Recent Results (from the past 1344 hour(s))   CBC and differential    Collection Time: 08/21/21  1:38 AM   Result Value Ref Range    WBC 14 89 (H) 4 31 - 10 16 Thousand/uL    RBC 3 91 3 81 - 5 12 Million/uL    Hemoglobin 8 7 (L) 11 5 - 15 4 g/dL    Hematocrit 30 4 (L) 34 8 - 46 1 %    MCV 78 (L) 82 - 98 fL    MCH 22 3 (L) 26 8 - 34 3 pg    MCHC 28 6 (L) 31 4 - 37 4 g/dL    RDW 21 6 (H) 11 6 - 15 1 %    MPV 9 8 8 9 - 12 7 fL    Platelets 245 124 - 111 Thousands/uL    nRBC 0 /100 WBCs    Neutrophils Relative 80 (H) 43 - 75 %    Immat GRANS % 1 0 - 2 %    Lymphocytes Relative 10 (L) 14 - 44 %    Monocytes Relative 6 4 - 12 %    Eosinophils Relative 2 0 - 6 %    Basophils Relative 1 0 - 1 %    Neutrophils Absolute 11 98 (H) 1 85 - 7 62 Thousands/µL    Immature Grans Absolute 0 09 0 00 - 0 20 Thousand/uL    Lymphocytes Absolute 1 53 0 60 - 4 47 Thousands/µL    Monocytes Absolute 0 90 0 17 - 1 22 Thousand/µL    Eosinophils Absolute 0 30 0 00 - 0 61 Thousand/µL    Basophils Absolute 0 09 0 00 - 0 10 Thousands/µL   Comprehensive metabolic panel    Collection Time: 08/21/21  1:38 AM   Result Value Ref Range    Sodium 139 136 - 145 mmol/L    Potassium 3 8 3 5 - 5 3 mmol/L    Chloride 105 100 - 108 mmol/L    CO2 28 21 - 32 mmol/L    ANION GAP 6 4 - 13 mmol/L    BUN 20 5 - 25 mg/dL    Creatinine 0 65 0 60 - 1 30 mg/dL    Glucose 104 65 - 140 mg/dL    Calcium 8 7 8 3 - 10 1 mg/dL    Corrected Calcium 9 2 8 3 - 10 1 mg/dL    AST 11 5 - 45 U/L    ALT 16 12 - 78 U/L    Alkaline Phosphatase 58 46 - 116 U/L    Total Protein 6 6 6 4 - 8 2 g/dL    Albumin 3 4 (L) 3 5 - 5 0 g/dL    Total Bilirubin 0 22 0 20 - 1 00 mg/dL    eGFR 107 ml/min/1 73sq m   Lipase    Collection Time: 08/21/21 1:38 AM   Result Value Ref Range    Lipase 114 73 - 393 u/L   UA w Reflex to Microscopic w Reflex to Culture    Collection Time: 08/21/21  5:57 AM    Specimen: Urine, Clean Catch   Result Value Ref Range    Color, UA Dk Yellow     Clarity, UA Cloudy     Specific [place], UA 1 024 1 003 - 1 030    pH, UA 6 0 4 5, 5 0, 5 5, 6 0, 6 5, 7 0, 7 5, 8 0    Leukocytes, UA Small (A) Negative    Nitrite, UA Negative Negative    Protein, UA 30 (1+) (A) Negative mg/dl    Glucose, UA Negative Negative mg/dl    Ketones, UA Trace (A) Negative mg/dl    Urobilinogen, UA 1 0 0 2, 1 0 E U /dl E U /dl    Bilirubin, UA Negative Negative    Blood, UA Large (A) Negative   Urine Microscopic    Collection Time: 08/21/21  5:57 AM   Result Value Ref Range    RBC, UA Innumerable (A) None Seen, 2-4 /hpf    WBC, UA 30-50 (A) None Seen, 2-4, 5-60 /hpf    Epithelial Cells None Seen None Seen, Occasional /hpf    Bacteria, UA None Seen None Seen, Occasional /hpf    Hyaline Casts, UA 25-50 (A) None Seen /lpf   Urine culture    Collection Time: 08/21/21  5:57 AM    Specimen: Urine, Clean Catch   Result Value Ref Range    Urine Culture (A)      <10,000 cfu/ml Beta Hemolytic Streptococcus Group B    Urine Culture <10,000 cfu/ml     POCT pregnancy, urine    Collection Time: 08/21/21  8:04 AM   Result Value Ref Range    EXT PREG TEST UR (Ref: Negative) Negative     Control valid    Stone analysis    Collection Time: 08/21/21 11:49 AM   Result Value Ref Range    COLOR Brown     Size 7x4 mm    Stone Weight 33 mg    Composition Comment     Ca Oxalate,Dihydrate 40 %    Ca Oxalate,Monohydr  60 %    STONE COMMENT Comment     Please note Comment     Disclaimer Comment     Photo Comment     Stone Source Comment    Basic metabolic panel    Collection Time: 08/22/21  5:27 AM   Result Value Ref Range    Sodium 138 136 - 145 mmol/L    Potassium 4 2 3 5 - 5 3 mmol/L    Chloride 105 100 - 108 mmol/L    CO2 28 21 - 32 mmol/L    ANION GAP 5 4 - 13 mmol/L    BUN 17 5 - 25 mg/dL    Creatinine 0 60 0 60 - 1 30 mg/dL    Glucose 107 65 - 140 mg/dL    Calcium 8 7 8 3 - 10 1 mg/dL    eGFR 110 ml/min/1 73sq m   CBC and differential    Collection Time: 08/22/21  5:27 AM   Result Value Ref Range    WBC 8 40 4 31 - 10 16 Thousand/uL    RBC 3 90 3 81 - 5 12 Million/uL    Hemoglobin 8 6 (L) 11 5 - 15 4 g/dL    Hematocrit 30 0 (L) 34 8 - 46 1 %    MCV 77 (L) 82 - 98 fL    MCH 22 1 (L) 26 8 - 34 3 pg    MCHC 28 7 (L) 31 4 - 37 4 g/dL    RDW 21 8 (H) 11 6 - 15 1 %    MPV 10 2 8 9 - 12 7 fL    Platelets 507 684 - 080 Thousands/uL    nRBC 0 /100 WBCs    Neutrophils Relative 78 (H) 43 - 75 %    Immat GRANS % 1 0 - 2 %    Lymphocytes Relative 12 (L) 14 - 44 %    Monocytes Relative 9 4 - 12 %    Eosinophils Relative 0 0 - 6 %    Basophils Relative 0 0 - 1 %    Neutrophils Absolute 6 55 1 85 - 7 62 Thousands/µL    Immature Grans Absolute 0 04 0 00 - 0 20 Thousand/uL    Lymphocytes Absolute 1 03 0 60 - 4 47 Thousands/µL    Monocytes Absolute 0 73 0 17 - 1 22 Thousand/µL    Eosinophils Absolute 0 02 0 00 - 0 61 Thousand/µL    Basophils Absolute 0 03 0 00 - 0 10 Thousands/µL   Iron Saturation %    Collection Time: 08/22/21  5:27 AM   Result Value Ref Range    Iron Saturation 5 %    TIBC 414 250 - 450 ug/dL    Iron 22 (L) 50 - 170 ug/dL   Ferritin    Collection Time: 08/22/21  5:27 AM   Result Value Ref Range    Ferritin 2 (L) 8 - 388 ng/mL            Physical Exam  Vitals and nursing note reviewed  Constitutional:       General: She is not in acute distress  Appearance: She is well-developed  She is not diaphoretic  HENT:      Head: Normocephalic and atraumatic  Eyes:      General:         Right eye: No discharge  Left eye: No discharge  Conjunctiva/sclera: Conjunctivae normal       Pupils: Pupils are equal, round, and reactive to light  Neck:      Thyroid: No thyromegaly  Vascular: No JVD  Cardiovascular:      Rate and Rhythm: Normal rate and regular rhythm        Heart sounds: Normal heart sounds  No murmur heard  No friction rub  No gallop  Pulmonary:      Effort: Pulmonary effort is normal  No respiratory distress  Breath sounds: Normal breath sounds  No wheezing or rales  Chest:      Chest wall: No tenderness  Abdominal:      General: There is no distension  Palpations: Abdomen is soft  Tenderness: There is no abdominal tenderness  Musculoskeletal:         General: No tenderness or deformity  Normal range of motion  Cervical back: Normal range of motion and neck supple  Lymphadenopathy:      Cervical: No cervical adenopathy  Skin:     General: Skin is warm and dry  Coloration: Skin is not pale  Findings: No erythema or rash  Neurological:      Mental Status: She is alert and oriented to person, place, and time  Cranial Nerves: No cranial nerve deficit  Coordination: Coordination normal    Psychiatric:         Behavior: Behavior normal          Thought Content:  Thought content normal          Judgment: Judgment normal

## 2021-09-01 NOTE — ASSESSMENT & PLAN NOTE
Completed antibiotic therapy  She has follow-up scheduled with Urology  Discussed adequate oral hydration  Treating with hydrochlorothiazide for prevention is not an option as her blood pressure is already at lower limits of normal   Discussed taking adequate calcium, vitamin-C, and vitamin-D supplementation

## 2021-09-07 ENCOUNTER — NURSE TRIAGE (OUTPATIENT)
Dept: OTHER | Facility: OTHER | Age: 46
End: 2021-09-07

## 2021-09-07 NOTE — TELEPHONE ENCOUNTER
That's great news  Fragments will continue to pass for few weeks after surgery/stent that means it worked well   F/u November as planned with imaging then

## 2021-09-07 NOTE — TELEPHONE ENCOUNTER
Reason for Disposition   All other urine symptoms    Answer Assessment - Initial Assessment Questions  1  SYMPTOM: "What's the main symptom you're concerned about?" (e g , frequency, incontinence)     Vaginal pain  Passed a gush of fluids with a hard, TicTac sized stone  2  ONSET: "When did the  started start?"      9/3/2021 at 0430  3  PAIN: "Is there any pain?" If so, ask: "How bad is it?" (Scale: 1-10; mild, moderate, severe)      Pain rated 10/10  Resolved after the stone was passed  4  CAUSE: "What do you think is causing the symptoms?"      Kidney stone  5  OTHER SYMPTOMS: "Do you have any other symptoms?" (e g , fever, flank pain, blood in urine, pain with urination)      Denied fever or hematuria  Vomited twice  6  PREGNANCY: "Is there any chance you are pregnant?" "When was your last menstrual period?"      Denied      Protocols used: URINARY SYMPTOMS-ADULT-[place]

## 2021-09-07 NOTE — TELEPHONE ENCOUNTER
Provider pool: dr Frankey Gulling patient and had stent removed on 8/24/2021  Patient passed another stone on 9/3/2021 (see triage)  She is asymptomatic now  Please advise

## 2021-09-08 NOTE — TELEPHONE ENCOUNTER
Spoke to patient to notify her of AP's recommendations  She states she needs to take 6 Calcium's a day d/t her having a gastric sleeve  Advised she should contact her doctor who performed the surgery to notify him of her kidney stone analysis and see if there are other recommendations for her  Patient is agreeable

## 2021-09-08 NOTE — TELEPHONE ENCOUNTER
Patient does not need to collect specimens  Recent stone analysis obtained with last surgery and identified primarily calcium oxalate  Please review dietary recommendations

## 2021-09-08 NOTE — TELEPHONE ENCOUNTER
Spoke to patient and she states she is doing well after she passed a stone  She would like to see if she needs to send the stone for analysis  Advised I will contact the provider and notify her if she does or doesn't need to  Patient is agreeable

## 2021-09-11 ENCOUNTER — HOSPITAL ENCOUNTER (EMERGENCY)
Facility: HOSPITAL | Age: 46
Discharge: HOME | End: 2021-09-11
Attending: INTERNAL MEDICINE
Payer: COMMERCIAL

## 2021-09-11 VITALS
HEART RATE: 55 BPM | OXYGEN SATURATION: 100 % | WEIGHT: 130 LBS | TEMPERATURE: 98.4 F | SYSTOLIC BLOOD PRESSURE: 131 MMHG | BODY MASS INDEX: 22.2 KG/M2 | HEIGHT: 64 IN | DIASTOLIC BLOOD PRESSURE: 68 MMHG | RESPIRATION RATE: 16 BRPM

## 2021-09-11 DIAGNOSIS — I80.9 THROMBOPHLEBITIS: Primary | ICD-10-CM

## 2021-09-11 DIAGNOSIS — L03.90 CELLULITIS: ICD-10-CM

## 2021-09-11 PROCEDURE — 99284 EMERGENCY DEPT VISIT MOD MDM: CPT | Performed by: INTERNAL MEDICINE

## 2021-09-11 PROCEDURE — 99283 EMERGENCY DEPT VISIT LOW MDM: CPT

## 2021-09-11 RX ORDER — AMOXICILLIN AND CLAVULANATE POTASSIUM 875; 125 MG/1; MG/1
1 TABLET, FILM COATED ORAL ONCE
Status: COMPLETED | OUTPATIENT
Start: 2021-09-11 | End: 2021-09-11

## 2021-09-11 RX ORDER — AMOXICILLIN AND CLAVULANATE POTASSIUM 875; 125 MG/1; MG/1
1 TABLET, FILM COATED ORAL EVERY 12 HOURS
Qty: 14 TABLET | Refills: 0 | Status: SHIPPED | OUTPATIENT
Start: 2021-09-11 | End: 2021-09-18

## 2021-09-11 RX ADMIN — AMOXICILLIN AND CLAVULANATE POTASSIUM 1 TABLET: 875; 125 TABLET, FILM COATED ORAL at 18:52

## 2021-09-11 NOTE — ED PROVIDER NOTES
History  Chief Complaint   Patient presents with    Mass     painful lump on right lower leg patient noticed lump today     69-year-old female presents with shin pain and a on her right leg     Patient noted at around 5 30 this afternoon she was working outside and she developed some leg pain  She felt her veins were all slightly swollen the right leg was swollen  Her pain is anterior right over the tibia itself  She has no calf pain no calf swelling no chest pain chest pressure or shortness of breath  She has no history of DVT  She is concerned that she may have been bitten by an insect  Prior to Admission Medications   Prescriptions Last Dose Informant Patient Reported? Taking? Multiple Vitamin (MULTIVITAMIN) tablet  Self Yes No   Sig: Take 1 tablet by mouth daily Wears a patch   NON FORMULARY  Self Yes No   calcium citrate-vitamin D (CITRACAL+D) 315-200 MG-UNIT per tablet  Self Yes No   Sig: Take 1 tablet by mouth 2 (two) times a day   pantoprazole (PROTONIX) 40 mg tablet  Self No No   Sig: Take 1 tablet (40 mg total) by mouth 2 (two) times a day      Facility-Administered Medications: None       Past Medical History:   Diagnosis Date    Abdominal pain     Brain condition     Pseudotumor Cerebri     Chronic kidney disease     De Quervain's disease (tenosynovitis)     Esophageal perforation     Gastric leak     GERD (gastroesophageal reflux disease)     Headache     Idiopathic intracranial hypertension     Kidney stone     Migraine     No blood products     per pt: personal and [employer] beliefs   surgeon office aware 12/13/19    Papilledema, both eyes     PONV (postoperative nausea and vomiting)     Postgastrectomy malabsorption     Presence of lumboperitoneal shunt     Resolved: Sep 20, 2017    Rotator cuff tendinitis     Resolved: Aug 23, 2017    Visual field defect        Past Surgical History:   Procedure Laterality Date    CSF SHUNT      LP shunt - 2015 -  shunt - 2017    ESOPHAGOGASTRODUODENOSCOPY N/A 2/23/2018    Procedure: ESOPHAGOGASTRODUODENOSCOPY (EGD) WITH ESOPHAGEAL STENT PLACEMENT;  Surgeon: Aristeo Lara MD;  [facility];  Service: Thoracic    ESOPHAGOGASTRODUODENOSCOPY N/A 2/23/2018    Procedure: ESOPHAGOGASTRODUODENOSCOPY (EGD) WITH REMOVAL ESOPHAGEAL STENT  AND REPLACEMENT WITH 23mm X155mm [address];  Surgeon: Aristeo Lara MD;  [facility];  Service: Thoracic    ESOPHAGOGASTRODUODENOSCOPY N/A 3/28/2018    Procedure: ESOPHAGOGASTRODUODENOSCOPY (EGD) with PEJ placement ;  Surgeon: Bharathi Ross MD;  [facility]; Service: Gastroenterology    ESOPHAGOGASTRODUODENOSCOPY N/A 4/5/2018    Procedure: ESOPHAGOGASTRODUODENOSCOPY (EGD) with botox injection and kaofed placement;  Surgeon: Diego Morel DO;  [facility]; Service: Gastroenterology    ESOPHAGOGASTRODUODENOSCOPY N/A 4/10/2018    Procedure: ESOPHAGOGASTRODUODENOSCOPY (EGD) with Kaofed placement;  Surgeon: Donavon Moffett MD;  [facility]; Service: Gastroenterology    ESOPHAGOSCOPY WITH STENT INSERTION N/A 1/24/2018    Procedure: INSERTION STENT ESOPHAGEAL;  Surgeon: Dany Louie MD;  [facility]; Service: Gastroenterology    EYE SURGERY Bilateral 1980    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)     FL RETROGRADE PYELOGRAM  6/24/2020    FL RETROGRADE PYELOGRAM  8/21/2021    GASTRIC BYPASS  12/19/2016    Lap sleeve gastrectomy w/shunt length shortening procedure    HIATAL HERNIA REPAIR      HYSTERECTOMY  03/14/2013    IR LUMBAR PUNCTURE  8/29/2018    LAPAROTOMY N/A 1/25/2018    Procedure: Exploratory Laparotomy, wash out,placement of drains, placement of NG feeding tube ;   Surgeon: Deanna Granados DO;  [facility];  Service: General    LUMBAR PERITONEAL SHUNT  11/19/2015    Laparoscopic assisted    NC CREATE SHUNT:VENTRIC-ATRIAL Right 12/18/2019    Procedure: IMAGE GUIDED INSERTION OF RIGHT CORONAL VENTRICULAR-ATRIAL SHUNT;  Surgeon: Adolphus Boas, MD;  [facility];  Service: Neurosurgery    VA CREATE SHUNT:VENTRIC-PERITONEAL Right 5/31/2017    Procedure: IMAGE GUIDED CORONAL PLACEMENT OF PROGRAMABLE VENTRICULAR-PERITONEAL SHUNT, REMOVAL OF LP SHUNT ;  Surgeon: Adolphus Boas, MD;  [facility];  Service: Neurosurgery    VA CYSTO/URETERO W/LITHOTRIPSY &INDWELL STENT INSRT Bilateral 6/24/2020    Procedure: CYSTOSCOPY URETEROSCOPY WITH LITHOTRIPSY HOLMIUM LASER, RETROGRADE PYELOGRAM AND exchange bilateral  STENTs URETERAL;  Surgeon: Ashli Krishnan MD;  [facility];  Service: Urology    VA CYSTO/URETERO W/LITHOTRIPSY &INDWELL STENT INSRT Left 8/21/2021    Procedure: CYSTOSCOPY; LEFT URETEROSCOPY WITH RETROGRADE PYELOGRAM, REMOVAL OF STONE AND INSERTION LEFT URETERAL STENT;  Surgeon: Maya Daniel MD;  [facility];  Service: Urology    VA CYSTOURETHROSCOPY,URETER CATHETER N/A 6/6/2020    Procedure: Bilateral CYSTOSCOPY RETROGRADE PYELOGRAM WITH INSERTION STENT URETERAL;  Surgeon: Yoko Ross MD;  Location: [facility] OR;  Service: Urology    VA EGD TRANSORAL BIOPSY SINGLE/MULTIPLE N/A 6/27/2018    Procedure: ESOPHAGOGASTRODUODENOSCOPY (EGD) with padlock clip placement;  Surgeon: Halley Zheng MD;  [facility];   Service: Jordan Minors CSF SHUNT,W/O REPLACE Right 2/5/2018    Procedure: Removal of  shunt;  Surgeon: Adolphus Boas, MD;  [facility];  Service: Neurosurgery    VA REPLACEMENT/REVISION,CSF SHUNT Right 1/25/2018    Procedure: Externalization of right-sided SHUNT VENTRICULAR-PERITONEAL in anterior chest wall ribs two and three level  ;  Surgeon: Muna English MD;  [facility];  Service: Neurosurgery    WRIST SURGERY Right     x3 2006, 2008       Family History   Problem Relation Age of Onset    No Known Problems Mother     No Known Problems Father     Thyroid cancer Brother     Colon cancer Maternal Grandfather     Cancer Paternal Grandmother     Cancer Paternal Grandfather     Cancer Family         Gastric    Leukemia Family     Colon cancer Family     Pancreatic cancer Family      I have reviewed and agree with the history as documented  E-Cigarette/Vaping    E-Cigarette Use Current Every Day User      E-Cigarette/Vaping Substances    Nicotine No     THC Yes     CBD Yes     Flavoring No     Other Yes lotion     Social History     Tobacco Use    Smoking status: Former Smoker     Packs/day: 0 50     Years: 20 00     Pack years: 10 00     Quit date: 2012     Years since quittin 0    Smokeless tobacco: Never Used   Vaping Use    Vaping Use: Every day    Substances: THC, CBD   Substance Use Topics    Alcohol use: Never    Drug use: Yes     Frequency: 7 0 times per week     Types: Marijuana     Comment: medical marijuana, vaping & topical        Review of Systems   Constitutional: Negative  Respiratory: Negative  Cardiovascular: Negative  Gastrointestinal: Negative  Genitourinary: Negative  Skin: Positive for color change  Hematological: Negative  Psychiatric/Behavioral: Negative  Physical Exam  Physical Exam  Vitals and nursing note reviewed  Constitutional:       General: She is not in acute distress  Appearance: Normal appearance  She is not ill-appearing  Eyes:      Extraocular Movements: Extraocular movements intact  Cardiovascular:      Rate and Rhythm: Normal rate and regular rhythm  Pulses: Normal pulses  Heart sounds: Normal heart sounds  Pulmonary:      Effort: Pulmonary effort is normal       Breath sounds: Normal breath sounds  Abdominal:      General: Abdomen is flat  Palpations: Abdomen is soft  Musculoskeletal:         General: Swelling and tenderness present  No signs of injury  Right lower leg: No edema  Left lower leg: No edema  Comments: Mild swelling over the anterior tibialis and tenderness  No erythema or tracking is appreciated   Skin:     General: Skin is warm  Capillary Refill: Capillary refill takes less than 2 seconds  Findings: No bruising or erythema  Comments: Patient with a slightly more prominent varicose vein in the anterior tibial area  Patient's calf soft bilaterally and equal size  There is no erythema no tenderness no cords are appreciated  Neurological:      General: No focal deficit present  Mental Status: She is alert and oriented to person, place, and time  Psychiatric:         Mood and Affect: Mood normal          Thought Content: Thought content normal          Judgment: Judgment normal          Vital Signs  ED Triage Vitals   Temperature Pulse Respirations Blood Pressure SpO2   09/11/21 1755 09/11/21 1755 09/11/21 1758 09/11/21 1755 09/11/21 1755   98 4 °F (36 9 °C) 55 16 131/68 100 %      Temp Source Heart Rate Source Patient Position - Orthostatic VS BP Location FiO2 (%)   09/11/21 1755 09/11/21 1755 09/11/21 1755 09/11/21 1755 --   Oral Monitor Sitting Left arm       Pain Score       09/11/21 1755       6           Vitals:    09/11/21 1755   BP: 131/68   Pulse: 55   Patient Position - Orthostatic VS: Sitting         Visual Acuity      ED Medications  Medications - No data to display    Diagnostic Studies  Results Reviewed     None                 No orders to display              Procedures  Procedures         ED Course                                           MDM  Number of Diagnoses or Management Options  Diagnosis management comments: Patient refused labs at this time  Explained to this is most likely thrombophlebitis may be an underlying cellulitis  She agreed to try an antibiotic and follow-up with her PCP  Disposition  Final diagnoses:   None     ED Disposition     None      Follow-up Information    None         Patient's Medications   Discharge Prescriptions    No medications on file     No discharge procedures on file      PDMP Review Value Time User    PDMP Reviewed  Yes 6/24/2020  3:21 PM Dunia Yepez MD          ED Provider  Electronically Signed by           Herrera Schneider MD  09/11/21 7712

## 2021-09-24 ENCOUNTER — OFFICE VISIT (OUTPATIENT)
Dept: BARIATRICS | Facility: CLINIC | Age: 46
End: 2021-09-24
Payer: COMMERCIAL

## 2021-09-24 VITALS
DIASTOLIC BLOOD PRESSURE: 82 MMHG | WEIGHT: 135 LBS | SYSTOLIC BLOOD PRESSURE: 104 MMHG | BODY MASS INDEX: 23.92 KG/M2 | HEIGHT: 63 IN | TEMPERATURE: 98 F

## 2021-09-24 DIAGNOSIS — Z48.815 ENCOUNTER FOR SURGICAL AFTERCARE FOLLOWING SURGERY OF DIGESTIVE SYSTEM: Primary | ICD-10-CM

## 2021-09-24 DIAGNOSIS — K91.2 POSTSURGICAL MALABSORPTION: ICD-10-CM

## 2021-09-24 DIAGNOSIS — Z98.84 BARIATRIC SURGERY STATUS: ICD-10-CM

## 2021-09-24 DIAGNOSIS — D50.8 OTHER IRON DEFICIENCY ANEMIA: ICD-10-CM

## 2021-09-24 DIAGNOSIS — G93.2 PSEUDOTUMOR CEREBRI: ICD-10-CM

## 2021-09-24 DIAGNOSIS — K21.9 GASTROESOPHAGEAL REFLUX DISEASE WITHOUT ESOPHAGITIS: ICD-10-CM

## 2021-09-24 PROCEDURE — 1036F TOBACCO NON-USER: CPT | Performed by: PHYSICIAN ASSISTANT

## 2021-09-24 PROCEDURE — 99214 OFFICE O/P EST MOD 30 MIN: CPT | Performed by: PHYSICIAN ASSISTANT

## 2021-09-24 PROCEDURE — 3008F BODY MASS INDEX DOCD: CPT | Performed by: PHYSICIAN ASSISTANT

## 2021-09-24 RX ORDER — OMEGA-3S/DHA/EPA/FISH OIL 1000-1400
1 CAPSULE,DELAYED RELEASE (ENTERIC COATED) ORAL 2 TIMES DAILY
COMMUNITY

## 2021-09-24 RX ORDER — FERROUS GLUCONATE 324(37.5)
324 TABLET ORAL
COMMUNITY

## 2021-09-24 RX ORDER — B-COMPLEX WITH VITAMIN C
300 TABLET ORAL DAILY
COMMUNITY
End: 2022-02-18 | Stop reason: ALTCHOICE

## 2021-09-24 RX ORDER — NICOTINE POLACRILEX 2 MG
1 GUM BUCCAL DAILY
COMMUNITY

## 2021-09-24 RX ORDER — CHOLECALCIFEROL (VITAMIN D3) 125 MCG
500 CAPSULE ORAL DAILY
COMMUNITY

## 2021-09-24 NOTE — PATIENT INSTRUCTIONS
· Follow-up with Dr Amos Campos  We kindly ask that your arrive 15 minutes before your scheduled appointment time with your provider to allow our staff to room you, get your vital signs and update your chart  · Call our office if you have any problems with abdominal pain especially associated with fever, chills, nausea, vomiting or any other concerns  · All  Post-bariatric surgery patients should be aware that very small quantities of any alcohol can cause impairment and it is very possible not to feel the effect  The effect can be in the system for several hours  It is also a stomach irritant  · It is advised to AVOID alcohol, Nonsteroidal antiinflammatory drugs (NSAIDS) and nicotine of all forms   Any of these can cause stomach irritation/pain  · Discussed the effects of alcohol on a bariatric patient and the increased impairment risk  · Keep up the good work!

## 2021-09-24 NOTE — PROGRESS NOTES
Assessment/Plan:     Patient ID: Deborah Rao is a 51 y o  female  Bariatric Surgery Status    status post  Lap sleeve gastrectomy in 2016 by Dr Alva Celaya  Laparoscopic  paraesophageal hernia repair in 2018 by Dr Davila complicated by leak GEJ, required open abdomen and groin gained hospitalization  patient report acid reflux on daily basis, patient had EGD with strata level 1,2,3 February 2020 with at least 50% improvement of her symptoms  she is currently on Dexilant 40 mg twice daily however still having significant reflux  She states she is unable to sleep lying down and cannot skip any doses of her medication  Feels she may need another stretta, will schedule with Dr Kalia Orr to discuss  Recently admitted into the hospital for kidney stones, of note has extensive history of nephrolithiasis     · Continued/Maintain healthy weight loss with good nutrition intakes  · Adequate hydration with at least 64oz  fluid intake  · Follow diet as discussed  · Follow vitamin and mineral recommendations as reviewed with you  · Exercise as tolerated  · Colonoscopy referral made: no pt refuses   · Mammogram: due     · Follow-up with Dr Alejo Jones  We kindly ask that your arrive 15 minutes before your scheduled appointment time with your provider to allow our staff to room you, get your vital signs and update your chart  · Call our office if you have any problems with abdominal pain especially associated with fever, chills, nausea, vomiting or any other concerns  · All  Post-bariatric surgery patients should be aware that very small quantities of any alcohol can cause impairment and it is very possible not to feel the effect  The effect can be in the system for several hours  It is also a stomach irritant  · It is advised to AVOID alcohol, Nonsteroidal antiinflammatory drugs (NSAIDS) and nicotine of all forms   Any of these can cause stomach irritation/pain      · Discussed the effects of alcohol on a bariatric patient and the increased impairment risk  · Keep up the good work! Postsurgical Malabsorption   -At risk for malabsorption of vitamins/minerals secondary to malabsorption and restriction of intake from bariatric surgery  -Currently taking adequate postop bariatric surgery vitamin supplementation  - recent hospital labs showed low iron - she is taking oral iron now advised referral to heme for faster IV iron repletion but pt hesitant of needles and Infusion due to medical history so wants to continue on oral supplements for now  - does not want to get bariatric blood work at this time  -Patient received education about the importance of adhering to a lifelong supplementation regimen to avoid vitamin/mineral deficiencies      Diagnoses and all orders for this visit:    Encounter for surgical aftercare following surgery of digestive system    Bariatric surgery status    Postsurgical malabsorption    Pseudotumor cerebri    Gastroesophageal reflux disease without esophagitis    Other iron deficiency anemia    Other orders  -     Biotin 1 MG CAPS; Take by mouth  -     ferrous gluconate 324 (37 5 Fe) mg; Take 324 mg by mouth 2 (two) times a day before meals  -     Thiamine HCl (vitamin B-1) 250 MG tablet; Take 300 mg by mouth daily  -     vitamin B-12 (VITAMIN B-12) 500 mcg tablet; Take 500 mcg by mouth daily  -     ascorbic acid (VITAMIN C) 250 MG CHEW; Chew 250 mg daily  -     Multiple Vitamins-Minerals (Hair/Skin/Nails) CAPS; Take by mouth 2 (two) times a day         Subjective:      Patient ID: Dane Morales is a 51 y o  female  status post  Lap sleeve gastrectomy in 2016 by Dr Elisa Swan  Laparoscopic  paraesophageal hernia repair in 2018 by Dr Davila complicated by leak GEJ, required open abdomen and groin gained hospitalization      Initial:  Current:135  EWL: (Weight loss is ahead of schedule at this post surgical period )  Keron: 130  Current BMI is Body mass index is 23 91 kg/m²  · Tolerating a regular diet-for the most part with exception of certain foods   · Eating at least 60 grams of protein per day-yes  · Following 30/60 minute rule with liquids-yes  · Drinking at least 64 ounces of fluid per day-yes  · Drinking carbonated beverages-occasional   · Sufficient exercise-no  · Using NSAIDs regularly-no  · Using nicotine-o  · Using alcohol-no  · Supplements: b1 + b12+ biotin + mvi + vit c + calcium + d3 + iron         The following portions of the patient's history were reviewed and updated as appropriate: allergies, current medications, past family history, past medical history, past social history, past surgical history and problem list     Review of Systems   Constitutional: Negative  Respiratory: Negative  Cardiovascular: Negative  Gastrointestinal:        + heartburn   Musculoskeletal: Negative  Skin: Negative  Neurological:        Hx pseudotumor cerebri s/p  shunt    Psychiatric/Behavioral: Negative  Objective:    /82 (BP Location: Left arm, Patient Position: Sitting, Cuff Size: Standard)   Temp 98 °F (36 7 °C) (Tympanic)   Ht 5' 3" (1 6 m)   Wt 61 2 kg (135 lb)   LMP  (LMP Unknown)   BMI 23 91 kg/m²      Physical Exam  Vitals and nursing note reviewed  Constitutional:       Appearance: Normal appearance  HENT:      Head: Normocephalic and atraumatic  Eyes:      Extraocular Movements: Extraocular movements intact  Pupils: Pupils are equal, round, and reactive to light  Cardiovascular:      Rate and Rhythm: Normal rate  Pulmonary:      Effort: Pulmonary effort is normal    Musculoskeletal:         General: Normal range of motion  Cervical back: Normal range of motion  Skin:     General: Skin is warm and dry  Neurological:      General: No focal deficit present  Mental Status: She is alert and oriented to person, place, and time     Psychiatric:         Mood and Affect: Mood normal

## 2021-10-01 ENCOUNTER — OFFICE VISIT (OUTPATIENT)
Dept: BARIATRICS | Facility: CLINIC | Age: 46
End: 2021-10-01
Payer: COMMERCIAL

## 2021-10-01 VITALS
HEIGHT: 63 IN | HEART RATE: 60 BPM | DIASTOLIC BLOOD PRESSURE: 78 MMHG | TEMPERATURE: 97.8 F | SYSTOLIC BLOOD PRESSURE: 106 MMHG | BODY MASS INDEX: 24.54 KG/M2 | WEIGHT: 138.5 LBS

## 2021-10-01 DIAGNOSIS — Z98.84 BARIATRIC SURGERY STATUS: ICD-10-CM

## 2021-10-01 DIAGNOSIS — K21.9 GASTROESOPHAGEAL REFLUX DISEASE, UNSPECIFIED WHETHER ESOPHAGITIS PRESENT: Primary | ICD-10-CM

## 2021-10-01 PROCEDURE — 99213 OFFICE O/P EST LOW 20 MIN: CPT | Performed by: SURGERY

## 2021-10-01 RX ORDER — DEXLANSOPRAZOLE 30 MG/1
30 CAPSULE, DELAYED RELEASE ORAL DAILY
Qty: 30 CAPSULE | Refills: 3 | Status: SHIPPED | OUTPATIENT
Start: 2021-10-01 | End: 2021-10-21

## 2021-10-08 ENCOUNTER — OFFICE VISIT (OUTPATIENT)
Dept: BARIATRICS | Facility: CLINIC | Age: 46
End: 2021-10-08

## 2021-10-08 VITALS — WEIGHT: 137.9 LBS | HEIGHT: 63 IN | BODY MASS INDEX: 24.43 KG/M2

## 2021-10-08 DIAGNOSIS — K91.2 POSTSURGICAL MALABSORPTION: ICD-10-CM

## 2021-10-08 DIAGNOSIS — D50.8 IRON DEFICIENCY ANEMIA SECONDARY TO INADEQUATE DIETARY IRON INTAKE: ICD-10-CM

## 2021-10-08 DIAGNOSIS — K91.2 POSTGASTRECTOMY MALABSORPTION: ICD-10-CM

## 2021-10-08 DIAGNOSIS — Z98.84 S/P GASTRIC BYPASS: ICD-10-CM

## 2021-10-08 DIAGNOSIS — Z90.3 POSTGASTRECTOMY MALABSORPTION: ICD-10-CM

## 2021-10-08 DIAGNOSIS — K21.9 GASTROESOPHAGEAL REFLUX DISEASE, UNSPECIFIED WHETHER ESOPHAGITIS PRESENT: ICD-10-CM

## 2021-10-08 DIAGNOSIS — R79.0 LOW FERRITIN LEVEL: Primary | ICD-10-CM

## 2021-10-08 PROCEDURE — RECHECK: Performed by: DIETITIAN, REGISTERED

## 2021-10-08 PROCEDURE — 3008F BODY MASS INDEX DOCD: CPT | Performed by: INTERNAL MEDICINE

## 2021-10-11 ENCOUNTER — TELEPHONE (OUTPATIENT)
Dept: SURGICAL ONCOLOGY | Facility: CLINIC | Age: 46
End: 2021-10-11

## 2021-10-14 DIAGNOSIS — R79.0 LOW FERRITIN LEVEL: ICD-10-CM

## 2021-10-14 DIAGNOSIS — K91.2 POSTGASTRECTOMY MALABSORPTION: Primary | ICD-10-CM

## 2021-10-14 DIAGNOSIS — G44.221 CHRONIC TENSION-TYPE HEADACHE, INTRACTABLE: ICD-10-CM

## 2021-10-14 DIAGNOSIS — G93.2 PSEUDOTUMOR CEREBRI: ICD-10-CM

## 2021-10-14 DIAGNOSIS — Z98.84 S/P GASTRIC BYPASS: ICD-10-CM

## 2021-10-14 DIAGNOSIS — D50.8 IRON DEFICIENCY ANEMIA SECONDARY TO INADEQUATE DIETARY IRON INTAKE: ICD-10-CM

## 2021-10-14 DIAGNOSIS — Z90.3 POSTGASTRECTOMY MALABSORPTION: Primary | ICD-10-CM

## 2021-10-14 DIAGNOSIS — Z98.84 BARIATRIC SURGERY STATUS: ICD-10-CM

## 2021-10-14 DIAGNOSIS — K21.9 GASTROESOPHAGEAL REFLUX DISEASE: ICD-10-CM

## 2021-10-14 DIAGNOSIS — D64.9 ANEMIA: ICD-10-CM

## 2021-10-21 ENCOUNTER — OFFICE VISIT (OUTPATIENT)
Dept: GASTROENTEROLOGY | Facility: MEDICAL CENTER | Age: 46
End: 2021-10-21
Payer: COMMERCIAL

## 2021-10-21 VITALS
TEMPERATURE: 97.9 F | BODY MASS INDEX: 25.15 KG/M2 | SYSTOLIC BLOOD PRESSURE: 137 MMHG | WEIGHT: 142 LBS | DIASTOLIC BLOOD PRESSURE: 83 MMHG | HEART RATE: 60 BPM

## 2021-10-21 DIAGNOSIS — Z98.84 BARIATRIC SURGERY STATUS: ICD-10-CM

## 2021-10-21 DIAGNOSIS — Z12.11 COLON CANCER SCREENING: Primary | ICD-10-CM

## 2021-10-21 DIAGNOSIS — K21.9 GASTROESOPHAGEAL REFLUX DISEASE, UNSPECIFIED WHETHER ESOPHAGITIS PRESENT: ICD-10-CM

## 2021-10-21 PROCEDURE — 1036F TOBACCO NON-USER: CPT | Performed by: INTERNAL MEDICINE

## 2021-10-21 PROCEDURE — 99214 OFFICE O/P EST MOD 30 MIN: CPT | Performed by: INTERNAL MEDICINE

## 2021-10-21 RX ORDER — OMEPRAZOLE 40 MG/1
40 CAPSULE, DELAYED RELEASE ORAL DAILY
COMMUNITY
End: 2021-10-21 | Stop reason: SDUPTHER

## 2021-10-21 RX ORDER — OMEPRAZOLE 40 MG/1
40 CAPSULE, DELAYED RELEASE ORAL 2 TIMES DAILY
Qty: 60 CAPSULE | Refills: 1 | Status: SHIPPED | OUTPATIENT
Start: 2021-10-21 | End: 2021-12-30 | Stop reason: SDUPTHER

## 2021-10-28 DIAGNOSIS — K21.9 GASTROESOPHAGEAL REFLUX DISEASE, UNSPECIFIED WHETHER ESOPHAGITIS PRESENT: ICD-10-CM

## 2021-11-03 ENCOUNTER — OFFICE VISIT (OUTPATIENT)
Dept: BARIATRICS | Facility: CLINIC | Age: 46
End: 2021-11-03

## 2021-11-03 VITALS — WEIGHT: 139.6 LBS | HEIGHT: 63 IN | BODY MASS INDEX: 24.73 KG/M2

## 2021-11-03 VITALS — WEIGHT: 139.6 LBS | BODY MASS INDEX: 24.73 KG/M2

## 2021-11-03 DIAGNOSIS — K21.9 GASTROESOPHAGEAL REFLUX DISEASE, UNSPECIFIED WHETHER ESOPHAGITIS PRESENT: ICD-10-CM

## 2021-11-03 DIAGNOSIS — Z98.84 S/P GASTRIC BYPASS: Primary | ICD-10-CM

## 2021-11-03 DIAGNOSIS — Z98.84 BARIATRIC SURGERY STATUS: Primary | ICD-10-CM

## 2021-11-03 PROCEDURE — RECHECK

## 2021-11-03 PROCEDURE — RECHECK: Performed by: DIETITIAN, REGISTERED

## 2021-11-09 ENCOUNTER — CONSULT (OUTPATIENT)
Dept: HEMATOLOGY ONCOLOGY | Facility: CLINIC | Age: 46
End: 2021-11-09
Payer: COMMERCIAL

## 2021-11-09 VITALS
RESPIRATION RATE: 18 BRPM | WEIGHT: 143.3 LBS | HEIGHT: 63 IN | OXYGEN SATURATION: 99 % | HEART RATE: 68 BPM | SYSTOLIC BLOOD PRESSURE: 132 MMHG | DIASTOLIC BLOOD PRESSURE: 82 MMHG | BODY MASS INDEX: 25.39 KG/M2 | TEMPERATURE: 97.8 F

## 2021-11-09 DIAGNOSIS — D50.8 IRON DEFICIENCY ANEMIA SECONDARY TO INADEQUATE DIETARY IRON INTAKE: ICD-10-CM

## 2021-11-09 DIAGNOSIS — D50.9 MICROCYTIC ANEMIA: Primary | ICD-10-CM

## 2021-11-09 PROCEDURE — 99244 OFF/OP CNSLTJ NEW/EST MOD 40: CPT | Performed by: NURSE PRACTITIONER

## 2021-11-09 PROCEDURE — 1036F TOBACCO NON-USER: CPT | Performed by: NURSE PRACTITIONER

## 2021-11-09 PROCEDURE — 3008F BODY MASS INDEX DOCD: CPT | Performed by: NURSE PRACTITIONER

## 2021-11-09 RX ORDER — SODIUM CHLORIDE 9 MG/ML
20 INJECTION, SOLUTION INTRAVENOUS ONCE
Status: CANCELLED | OUTPATIENT
Start: 2021-11-18

## 2021-11-18 ENCOUNTER — HOSPITAL ENCOUNTER (OUTPATIENT)
Dept: INFUSION CENTER | Facility: HOSPITAL | Age: 46
Discharge: HOME | End: 2021-11-18
Attending: INTERNAL MEDICINE
Payer: COMMERCIAL

## 2021-11-18 VITALS
DIASTOLIC BLOOD PRESSURE: 80 MMHG | HEART RATE: 59 BPM | RESPIRATION RATE: 18 BRPM | SYSTOLIC BLOOD PRESSURE: 120 MMHG | OXYGEN SATURATION: 98 % | TEMPERATURE: 98.6 F

## 2021-11-18 DIAGNOSIS — D50.8 IRON DEFICIENCY ANEMIA SECONDARY TO INADEQUATE DIETARY IRON INTAKE: Primary | ICD-10-CM

## 2021-11-18 PROCEDURE — 96365 THER/PROPH/DIAG IV INF INIT: CPT

## 2021-11-18 PROCEDURE — 96367 TX/PROPH/DG ADDL SEQ IV INF: CPT

## 2021-11-18 RX ORDER — SODIUM CHLORIDE 9 MG/ML
20 INJECTION, SOLUTION INTRAVENOUS ONCE
Status: COMPLETED | OUTPATIENT
Start: 2021-11-18 | End: 2021-11-18

## 2021-11-18 RX ORDER — SODIUM CHLORIDE 9 MG/ML
20 INJECTION, SOLUTION INTRAVENOUS ONCE
Status: CANCELLED | OUTPATIENT
Start: 2021-11-24

## 2021-11-18 RX ADMIN — IRON SUCROSE 200 MG: 20 INJECTION, SOLUTION INTRAVENOUS at 15:19

## 2021-11-18 RX ADMIN — SODIUM CHLORIDE 20 ML/HR: 0.9 INJECTION, SOLUTION INTRAVENOUS at 14:20

## 2021-11-18 RX ADMIN — FAMOTIDINE 20 MG: 10 INJECTION INTRAVENOUS at 14:56

## 2021-11-18 RX ADMIN — DEXAMETHASONE SODIUM PHOSPHATE 8 MG: 10 INJECTION, SOLUTION INTRAMUSCULAR; INTRAVENOUS at 14:20

## 2021-11-24 ENCOUNTER — HOSPITAL ENCOUNTER (OUTPATIENT)
Dept: INFUSION CENTER | Facility: HOSPITAL | Age: 46
Discharge: HOME | End: 2021-11-24
Attending: INTERNAL MEDICINE
Payer: COMMERCIAL

## 2021-11-24 VITALS
HEART RATE: 65 BPM | RESPIRATION RATE: 18 BRPM | DIASTOLIC BLOOD PRESSURE: 81 MMHG | SYSTOLIC BLOOD PRESSURE: 137 MMHG | TEMPERATURE: 98.1 F | OXYGEN SATURATION: 97 %

## 2021-11-24 DIAGNOSIS — Z98.84 S/P GASTRIC BYPASS: ICD-10-CM

## 2021-11-24 DIAGNOSIS — G44.221 CHRONIC TENSION-TYPE HEADACHE, INTRACTABLE: ICD-10-CM

## 2021-11-24 DIAGNOSIS — D50.9 MICROCYTIC ANEMIA: ICD-10-CM

## 2021-11-24 DIAGNOSIS — K91.2 POSTGASTRECTOMY MALABSORPTION: ICD-10-CM

## 2021-11-24 DIAGNOSIS — Z98.84 BARIATRIC SURGERY STATUS: ICD-10-CM

## 2021-11-24 DIAGNOSIS — G93.2 PSEUDOTUMOR CEREBRI: ICD-10-CM

## 2021-11-24 DIAGNOSIS — D64.9 ANEMIA: ICD-10-CM

## 2021-11-24 DIAGNOSIS — D50.8 IRON DEFICIENCY ANEMIA SECONDARY TO INADEQUATE DIETARY IRON INTAKE: Primary | ICD-10-CM

## 2021-11-24 DIAGNOSIS — Z90.3 POSTGASTRECTOMY MALABSORPTION: ICD-10-CM

## 2021-11-24 DIAGNOSIS — K21.9 GASTROESOPHAGEAL REFLUX DISEASE: ICD-10-CM

## 2021-11-24 DIAGNOSIS — R79.0 LOW FERRITIN LEVEL: ICD-10-CM

## 2021-11-24 LAB
25(OH)D3 SERPL-MCNC: 53.5 NG/ML (ref 30–100)
ALBUMIN SERPL BCP-MCNC: 4 G/DL (ref 3.5–5)
ALP SERPL-CCNC: 43 U/L (ref 34–104)
ALT SERPL W P-5'-P-CCNC: 15 U/L (ref 7–52)
ANION GAP SERPL CALCULATED.3IONS-SCNC: 5 MMOL/L (ref 4–13)
AST SERPL W P-5'-P-CCNC: 30 U/L (ref 13–39)
BILIRUB SERPL-MCNC: 0.25 MG/DL (ref 0.2–1)
BUN SERPL-MCNC: 14 MG/DL (ref 5–25)
CALCIUM SERPL-MCNC: 9.3 MG/DL (ref 8.4–10.2)
CHLORIDE SERPL-SCNC: 100 MMOL/L (ref 96–108)
CO2 SERPL-SCNC: 29 MMOL/L (ref 21–32)
CREAT SERPL-MCNC: 0.61 MG/DL (ref 0.6–1.3)
DAT POLY-SP REAG RBC QL: NEGATIVE
ERYTHROCYTE [DISTWIDTH] IN BLOOD BY AUTOMATED COUNT: 17.9 % (ref 11.6–15.1)
FERRITIN SERPL-MCNC: 41 NG/ML (ref 8–388)
FOLATE SERPL-MCNC: >20 NG/ML (ref 3.1–17.5)
GFR SERPL CREATININE-BSD FRML MDRD: 108 ML/MIN/1.73SQ M
GLUCOSE SERPL-MCNC: 80 MG/DL (ref 65–140)
HCT VFR BLD AUTO: 41 % (ref 34.8–46.1)
HGB BLD-MCNC: 12.7 G/DL (ref 11.5–15.4)
IRON SATN MFR SERPL: 28 % (ref 15–50)
IRON SERPL-MCNC: 109 UG/DL (ref 50–170)
MCH RBC QN AUTO: 26.8 PG (ref 26.8–34.3)
MCHC RBC AUTO-ENTMCNC: 31 G/DL (ref 31.4–37.4)
MCV RBC AUTO: 87 FL (ref 82–98)
PLATELET # BLD AUTO: 156 THOUSANDS/UL (ref 149–390)
PMV BLD AUTO: 11.1 FL (ref 8.9–12.7)
POTASSIUM SERPL-SCNC: 5 MMOL/L (ref 3.5–5.3)
PROT SERPL-MCNC: 6.6 G/DL (ref 6.4–8.4)
PTH-INTACT SERPL-MCNC: 75.4 PG/ML (ref 18.4–80.1)
RBC # BLD AUTO: 4.73 MILLION/UL (ref 3.81–5.12)
SODIUM SERPL-SCNC: 134 MMOL/L (ref 135–147)
TIBC SERPL-MCNC: 386 UG/DL (ref 250–450)
VIT B12 SERPL-MCNC: 433 PG/ML (ref 100–900)
WBC # BLD AUTO: 6.29 THOUSAND/UL (ref 4.31–10.16)

## 2021-11-24 PROCEDURE — 84590 ASSAY OF VITAMIN A: CPT

## 2021-11-24 PROCEDURE — 83550 IRON BINDING TEST: CPT

## 2021-11-24 PROCEDURE — 84425 ASSAY OF VITAMIN B-1: CPT

## 2021-11-24 PROCEDURE — 84630 ASSAY OF ZINC: CPT

## 2021-11-24 PROCEDURE — 82607 VITAMIN B-12: CPT

## 2021-11-24 PROCEDURE — 96365 THER/PROPH/DIAG IV INF INIT: CPT

## 2021-11-24 PROCEDURE — 82728 ASSAY OF FERRITIN: CPT

## 2021-11-24 PROCEDURE — 96375 TX/PRO/DX INJ NEW DRUG ADDON: CPT

## 2021-11-24 PROCEDURE — 82746 ASSAY OF FOLIC ACID SERUM: CPT

## 2021-11-24 PROCEDURE — 83970 ASSAY OF PARATHORMONE: CPT

## 2021-11-24 PROCEDURE — 83540 ASSAY OF IRON: CPT

## 2021-11-24 PROCEDURE — 86880 COOMBS TEST DIRECT: CPT

## 2021-11-24 PROCEDURE — 82306 VITAMIN D 25 HYDROXY: CPT

## 2021-11-24 PROCEDURE — 80053 COMPREHEN METABOLIC PANEL: CPT

## 2021-11-24 PROCEDURE — 96367 TX/PROPH/DG ADDL SEQ IV INF: CPT

## 2021-11-24 PROCEDURE — 85027 COMPLETE CBC AUTOMATED: CPT

## 2021-11-24 RX ORDER — SODIUM CHLORIDE 9 MG/ML
20 INJECTION, SOLUTION INTRAVENOUS ONCE
Status: CANCELLED | OUTPATIENT
Start: 2021-11-25

## 2021-11-24 RX ORDER — SODIUM CHLORIDE 9 MG/ML
20 INJECTION, SOLUTION INTRAVENOUS ONCE
Status: COMPLETED | OUTPATIENT
Start: 2021-11-24 | End: 2021-11-24

## 2021-11-24 RX ADMIN — SODIUM CHLORIDE 20 ML/HR: 0.9 INJECTION, SOLUTION INTRAVENOUS at 14:35

## 2021-11-24 RX ADMIN — DEXAMETHASONE SODIUM PHOSPHATE 8 MG: 10 INJECTION, SOLUTION INTRAMUSCULAR; INTRAVENOUS at 14:34

## 2021-11-24 RX ADMIN — FAMOTIDINE 20 MG: 10 INJECTION INTRAVENOUS at 15:11

## 2021-11-24 RX ADMIN — SODIUM CHLORIDE 200 MG: 9 INJECTION, SOLUTION INTRAVENOUS at 15:34

## 2021-12-01 LAB
VIT B1 BLD-SCNC: 139.6 NMOL/L (ref 66.5–200)
ZINC SERPL-MCNC: 77 UG/DL (ref 44–115)

## 2021-12-02 ENCOUNTER — HOSPITAL ENCOUNTER (OUTPATIENT)
Dept: INFUSION CENTER | Facility: HOSPITAL | Age: 46
Discharge: HOME | End: 2021-12-02
Attending: INTERNAL MEDICINE
Payer: COMMERCIAL

## 2021-12-02 VITALS
SYSTOLIC BLOOD PRESSURE: 114 MMHG | RESPIRATION RATE: 18 BRPM | OXYGEN SATURATION: 98 % | HEART RATE: 64 BPM | DIASTOLIC BLOOD PRESSURE: 78 MMHG | TEMPERATURE: 98.3 F

## 2021-12-02 DIAGNOSIS — D50.8 IRON DEFICIENCY ANEMIA SECONDARY TO INADEQUATE DIETARY IRON INTAKE: Primary | ICD-10-CM

## 2021-12-02 LAB — VIT A SERPL-MCNC: 28.7 UG/DL (ref 20.1–62)

## 2021-12-02 PROCEDURE — 96365 THER/PROPH/DIAG IV INF INIT: CPT

## 2021-12-02 PROCEDURE — 96367 TX/PROPH/DG ADDL SEQ IV INF: CPT

## 2021-12-02 RX ORDER — SODIUM CHLORIDE 9 MG/ML
20 INJECTION, SOLUTION INTRAVENOUS ONCE
Status: COMPLETED | OUTPATIENT
Start: 2021-12-02 | End: 2021-12-02

## 2021-12-02 RX ORDER — SODIUM CHLORIDE 9 MG/ML
20 INJECTION, SOLUTION INTRAVENOUS ONCE
Status: CANCELLED | OUTPATIENT
Start: 2021-12-09

## 2021-12-02 RX ADMIN — IRON SUCROSE 200 MG: 20 INJECTION, SOLUTION INTRAVENOUS at 09:26

## 2021-12-02 RX ADMIN — DEXAMETHASONE SODIUM PHOSPHATE 8 MG: 10 INJECTION, SOLUTION INTRAMUSCULAR; INTRAVENOUS at 08:43

## 2021-12-02 RX ADMIN — FAMOTIDINE 20 MG: 10 INJECTION INTRAVENOUS at 09:05

## 2021-12-02 RX ADMIN — SODIUM CHLORIDE 20 ML/HR: 0.9 INJECTION, SOLUTION INTRAVENOUS at 08:40

## 2021-12-08 ENCOUNTER — TELEPHONE (OUTPATIENT)
Dept: NEUROSURGERY | Facility: CLINIC | Age: 46
End: 2021-12-08

## 2021-12-08 DIAGNOSIS — Z98.2 HISTORY OF BRAIN SHUNT: ICD-10-CM

## 2021-12-08 DIAGNOSIS — Z98.2 S/P VENTRICULAR SHUNT PLACEMENT: Primary | ICD-10-CM

## 2021-12-09 ENCOUNTER — HOSPITAL ENCOUNTER (OUTPATIENT)
Dept: INFUSION CENTER | Facility: HOSPITAL | Age: 46
Discharge: HOME | End: 2021-12-09
Attending: INTERNAL MEDICINE
Payer: COMMERCIAL

## 2021-12-09 VITALS
OXYGEN SATURATION: 94 % | HEART RATE: 65 BPM | SYSTOLIC BLOOD PRESSURE: 101 MMHG | DIASTOLIC BLOOD PRESSURE: 69 MMHG | RESPIRATION RATE: 18 BRPM | TEMPERATURE: 98.1 F

## 2021-12-09 DIAGNOSIS — D50.8 IRON DEFICIENCY ANEMIA SECONDARY TO INADEQUATE DIETARY IRON INTAKE: Primary | ICD-10-CM

## 2021-12-09 PROCEDURE — 96367 TX/PROPH/DG ADDL SEQ IV INF: CPT

## 2021-12-09 PROCEDURE — 96365 THER/PROPH/DIAG IV INF INIT: CPT

## 2021-12-09 PROCEDURE — 96375 TX/PRO/DX INJ NEW DRUG ADDON: CPT

## 2021-12-09 RX ORDER — SODIUM CHLORIDE 9 MG/ML
20 INJECTION, SOLUTION INTRAVENOUS ONCE
Status: CANCELLED | OUTPATIENT
Start: 2021-12-17

## 2021-12-09 RX ORDER — SODIUM CHLORIDE 9 MG/ML
20 INJECTION, SOLUTION INTRAVENOUS ONCE
Status: COMPLETED | OUTPATIENT
Start: 2021-12-09 | End: 2021-12-09

## 2021-12-09 RX ADMIN — IRON SUCROSE 200 MG: 20 INJECTION, SOLUTION INTRAVENOUS at 14:18

## 2021-12-09 RX ADMIN — SODIUM CHLORIDE 20 ML/HR: 0.9 INJECTION, SOLUTION INTRAVENOUS at 13:33

## 2021-12-09 RX ADMIN — FAMOTIDINE 20 MG: 10 INJECTION INTRAVENOUS at 14:03

## 2021-12-09 RX ADMIN — DEXAMETHASONE SODIUM PHOSPHATE 8 MG: 10 INJECTION, SOLUTION INTRAMUSCULAR; INTRAVENOUS at 13:33

## 2021-12-14 ENCOUNTER — TELEPHONE (OUTPATIENT)
Dept: GASTROENTEROLOGY | Facility: HOSPITAL | Age: 46
End: 2021-12-14

## 2021-12-15 ENCOUNTER — ANESTHESIA EVENT (OUTPATIENT)
Dept: GASTROENTEROLOGY | Facility: HOSPITAL | Age: 46
End: 2021-12-15

## 2021-12-15 ENCOUNTER — HOSPITAL ENCOUNTER (OUTPATIENT)
Dept: GASTROENTEROLOGY | Facility: HOSPITAL | Age: 46
Setting detail: OUTPATIENT SURGERY
Discharge: HOME | End: 2021-12-15
Attending: INTERNAL MEDICINE
Payer: COMMERCIAL

## 2021-12-15 ENCOUNTER — ANESTHESIA (OUTPATIENT)
Dept: GASTROENTEROLOGY | Facility: HOSPITAL | Age: 46
End: 2021-12-15

## 2021-12-15 VITALS
RESPIRATION RATE: 18 BRPM | OXYGEN SATURATION: 98 % | DIASTOLIC BLOOD PRESSURE: 60 MMHG | SYSTOLIC BLOOD PRESSURE: 113 MMHG | TEMPERATURE: 96.8 F | HEART RATE: 61 BPM

## 2021-12-15 DIAGNOSIS — Z98.84 BARIATRIC SURGERY STATUS: ICD-10-CM

## 2021-12-15 DIAGNOSIS — K21.9 GASTROESOPHAGEAL REFLUX DISEASE, UNSPECIFIED WHETHER ESOPHAGITIS PRESENT: ICD-10-CM

## 2021-12-15 PROCEDURE — 88305 TISSUE EXAM BY PATHOLOGIST: CPT | Performed by: PATHOLOGY

## 2021-12-15 PROCEDURE — 43239 EGD BIOPSY SINGLE/MULTIPLE: CPT | Performed by: INTERNAL MEDICINE

## 2021-12-15 RX ORDER — PROPOFOL 10 MG/ML
INJECTION, EMULSION INTRAVENOUS AS NEEDED
Status: DISCONTINUED | OUTPATIENT
Start: 2021-12-15 | End: 2021-12-15

## 2021-12-15 RX ORDER — FENTANYL CITRATE 50 UG/ML
INJECTION, SOLUTION INTRAMUSCULAR; INTRAVENOUS AS NEEDED
Status: DISCONTINUED | OUTPATIENT
Start: 2021-12-15 | End: 2021-12-15

## 2021-12-15 RX ORDER — SODIUM CHLORIDE 9 MG/ML
125 INJECTION, SOLUTION INTRAVENOUS CONTINUOUS
Status: DISCONTINUED | OUTPATIENT
Start: 2021-12-15 | End: 2021-12-19 | Stop reason: HOSPADM

## 2021-12-15 RX ORDER — PROPOFOL 10 MG/ML
INJECTION, EMULSION INTRAVENOUS CONTINUOUS PRN
Status: DISCONTINUED | OUTPATIENT
Start: 2021-12-15 | End: 2021-12-15

## 2021-12-15 RX ORDER — SODIUM CHLORIDE 9 MG/ML
INJECTION, SOLUTION INTRAVENOUS CONTINUOUS PRN
Status: DISCONTINUED | OUTPATIENT
Start: 2021-12-15 | End: 2021-12-15

## 2021-12-15 RX ORDER — LIDOCAINE HYDROCHLORIDE 10 MG/ML
INJECTION, SOLUTION EPIDURAL; INFILTRATION; INTRACAUDAL; PERINEURAL AS NEEDED
Status: DISCONTINUED | OUTPATIENT
Start: 2021-12-15 | End: 2021-12-15

## 2021-12-15 RX ADMIN — PROPOFOL 20 MG: 10 INJECTION, EMULSION INTRAVENOUS at 14:35

## 2021-12-15 RX ADMIN — LIDOCAINE HYDROCHLORIDE 50 MG: 10 INJECTION, SOLUTION EPIDURAL; INFILTRATION; INTRACAUDAL; PERINEURAL at 14:34

## 2021-12-15 RX ADMIN — PROPOFOL 140 MCG/KG/MIN: 10 INJECTION, EMULSION INTRAVENOUS at 14:36

## 2021-12-15 RX ADMIN — SODIUM CHLORIDE 125 ML/HR: 0.9 INJECTION, SOLUTION INTRAVENOUS at 13:47

## 2021-12-15 RX ADMIN — SODIUM CHLORIDE: 0.9 INJECTION, SOLUTION INTRAVENOUS at 14:31

## 2021-12-15 RX ADMIN — FENTANYL CITRATE 25 MCG: 50 INJECTION INTRAMUSCULAR; INTRAVENOUS at 14:33

## 2021-12-15 RX ADMIN — PROPOFOL 80 MG: 10 INJECTION, EMULSION INTRAVENOUS at 14:34

## 2021-12-17 ENCOUNTER — OFFICE VISIT (OUTPATIENT)
Dept: INTERNAL MEDICINE CLINIC | Age: 46
End: 2021-12-17
Payer: COMMERCIAL

## 2021-12-17 ENCOUNTER — HOSPITAL ENCOUNTER (OUTPATIENT)
Dept: INFUSION CENTER | Facility: HOSPITAL | Age: 46
Discharge: HOME | End: 2021-12-17
Attending: INTERNAL MEDICINE
Payer: COMMERCIAL

## 2021-12-17 VITALS
RESPIRATION RATE: 18 BRPM | HEART RATE: 76 BPM | SYSTOLIC BLOOD PRESSURE: 115 MMHG | DIASTOLIC BLOOD PRESSURE: 81 MMHG | TEMPERATURE: 98.7 F | OXYGEN SATURATION: 97 %

## 2021-12-17 VITALS
WEIGHT: 143.3 LBS | SYSTOLIC BLOOD PRESSURE: 114 MMHG | DIASTOLIC BLOOD PRESSURE: 76 MMHG | TEMPERATURE: 98.2 F | OXYGEN SATURATION: 99 % | BODY MASS INDEX: 25.39 KG/M2 | HEIGHT: 63 IN | HEART RATE: 70 BPM

## 2021-12-17 DIAGNOSIS — Z00.00 ANNUAL PHYSICAL EXAM: ICD-10-CM

## 2021-12-17 DIAGNOSIS — D50.8 IRON DEFICIENCY ANEMIA SECONDARY TO INADEQUATE DIETARY IRON INTAKE: Primary | ICD-10-CM

## 2021-12-17 DIAGNOSIS — Z11.59 ENCOUNTER FOR HEPATITIS C SCREENING TEST FOR LOW RISK PATIENT: ICD-10-CM

## 2021-12-17 DIAGNOSIS — Z28.21 REFUSED INFLUENZA VACCINE: ICD-10-CM

## 2021-12-17 DIAGNOSIS — Z28.21 COVID-19 VACCINATION REFUSED: ICD-10-CM

## 2021-12-17 DIAGNOSIS — F43.10 PTSD (POST-TRAUMATIC STRESS DISORDER): ICD-10-CM

## 2021-12-17 DIAGNOSIS — Z13.220 SCREENING CHOLESTEROL LEVEL: ICD-10-CM

## 2021-12-17 DIAGNOSIS — Z12.31 ENCOUNTER FOR SCREENING MAMMOGRAM FOR MALIGNANT NEOPLASM OF BREAST: Primary | ICD-10-CM

## 2021-12-17 PROCEDURE — 96365 THER/PROPH/DIAG IV INF INIT: CPT

## 2021-12-17 PROCEDURE — 99396 PREV VISIT EST AGE 40-64: CPT | Performed by: FAMILY MEDICINE

## 2021-12-17 PROCEDURE — 1036F TOBACCO NON-USER: CPT | Performed by: FAMILY MEDICINE

## 2021-12-17 PROCEDURE — 96375 TX/PRO/DX INJ NEW DRUG ADDON: CPT

## 2021-12-17 PROCEDURE — 3008F BODY MASS INDEX DOCD: CPT | Performed by: FAMILY MEDICINE

## 2021-12-17 PROCEDURE — 96367 TX/PROPH/DG ADDL SEQ IV INF: CPT

## 2021-12-17 RX ORDER — SODIUM CHLORIDE 9 MG/ML
20 INJECTION, SOLUTION INTRAVENOUS ONCE
Status: COMPLETED | OUTPATIENT
Start: 2021-12-17 | End: 2021-12-17

## 2021-12-17 RX ORDER — SODIUM CHLORIDE 9 MG/ML
20 INJECTION, SOLUTION INTRAVENOUS ONCE
Status: CANCELLED | OUTPATIENT
Start: 2021-12-23

## 2021-12-17 RX ADMIN — DEXAMETHASONE SODIUM PHOSPHATE 8 MG: 10 INJECTION, SOLUTION INTRAMUSCULAR; INTRAVENOUS at 13:43

## 2021-12-17 RX ADMIN — SODIUM CHLORIDE 20 ML/HR: 0.9 INJECTION, SOLUTION INTRAVENOUS at 13:23

## 2021-12-17 RX ADMIN — IRON SUCROSE 200 MG: 20 INJECTION, SOLUTION INTRAVENOUS at 14:33

## 2021-12-17 RX ADMIN — FAMOTIDINE 20 MG: 10 INJECTION INTRAVENOUS at 13:23

## 2021-12-22 DIAGNOSIS — D50.8 IRON DEFICIENCY ANEMIA SECONDARY TO INADEQUATE DIETARY IRON INTAKE: Primary | ICD-10-CM

## 2021-12-22 RX ORDER — SODIUM CHLORIDE 9 MG/ML
20 INJECTION, SOLUTION INTRAVENOUS ONCE
Status: CANCELLED | OUTPATIENT
Start: 2021-12-27

## 2021-12-27 ENCOUNTER — HOSPITAL ENCOUNTER (OUTPATIENT)
Dept: INFUSION CENTER | Facility: HOSPITAL | Age: 46
Discharge: HOME | End: 2021-12-27
Attending: INTERNAL MEDICINE
Payer: COMMERCIAL

## 2021-12-27 VITALS
DIASTOLIC BLOOD PRESSURE: 82 MMHG | TEMPERATURE: 98.5 F | OXYGEN SATURATION: 98 % | SYSTOLIC BLOOD PRESSURE: 123 MMHG | HEART RATE: 60 BPM | RESPIRATION RATE: 18 BRPM

## 2021-12-27 DIAGNOSIS — D50.8 IRON DEFICIENCY ANEMIA SECONDARY TO INADEQUATE DIETARY IRON INTAKE: Primary | ICD-10-CM

## 2021-12-27 DIAGNOSIS — Z13.220 SCREENING CHOLESTEROL LEVEL: ICD-10-CM

## 2021-12-27 DIAGNOSIS — Z11.59 ENCOUNTER FOR HEPATITIS C SCREENING TEST FOR LOW RISK PATIENT: ICD-10-CM

## 2021-12-27 LAB
CHOLEST SERPL-MCNC: 259 MG/DL
HCV AB SER QL: NORMAL
HDLC SERPL-MCNC: 60 MG/DL
LDLC SERPL CALC-MCNC: 182 MG/DL (ref 0–100)
NONHDLC SERPL-MCNC: 199 MG/DL
TRIGL SERPL-MCNC: 87 MG/DL

## 2021-12-27 PROCEDURE — 96365 THER/PROPH/DIAG IV INF INIT: CPT

## 2021-12-27 PROCEDURE — 96367 TX/PROPH/DG ADDL SEQ IV INF: CPT

## 2021-12-27 PROCEDURE — 86803 HEPATITIS C AB TEST: CPT

## 2021-12-27 PROCEDURE — 96375 TX/PRO/DX INJ NEW DRUG ADDON: CPT

## 2021-12-27 PROCEDURE — 80061 LIPID PANEL: CPT

## 2021-12-27 RX ORDER — SODIUM CHLORIDE 9 MG/ML
20 INJECTION, SOLUTION INTRAVENOUS ONCE
Status: CANCELLED | OUTPATIENT
Start: 2021-12-27

## 2021-12-27 RX ORDER — SODIUM CHLORIDE 9 MG/ML
20 INJECTION, SOLUTION INTRAVENOUS ONCE
Status: COMPLETED | OUTPATIENT
Start: 2021-12-27 | End: 2021-12-27

## 2021-12-27 RX ADMIN — IRON SUCROSE 200 MG: 20 INJECTION, SOLUTION INTRAVENOUS at 14:11

## 2021-12-27 RX ADMIN — FAMOTIDINE 20 MG: 10 INJECTION INTRAVENOUS at 13:45

## 2021-12-27 RX ADMIN — SODIUM CHLORIDE 20 ML/HR: 0.9 INJECTION, SOLUTION INTRAVENOUS at 13:00

## 2021-12-27 RX ADMIN — DEXAMETHASONE SODIUM PHOSPHATE 8 MG: 10 INJECTION, SOLUTION INTRAMUSCULAR; INTRAVENOUS at 13:02

## 2021-12-27 NOTE — PROGRESS NOTES
Patient stated iron burning, ran saline concurrent, felt better  Patient tolerated infusion well, patient left in stable condition  AVS printed

## 2021-12-30 DIAGNOSIS — K21.9 GASTROESOPHAGEAL REFLUX DISEASE, UNSPECIFIED WHETHER ESOPHAGITIS PRESENT: ICD-10-CM

## 2021-12-30 RX ORDER — OMEPRAZOLE 40 MG/1
40 CAPSULE, DELAYED RELEASE ORAL 2 TIMES DAILY
Qty: 60 CAPSULE | Refills: 2 | Status: SHIPPED | OUTPATIENT
Start: 2021-12-30 | End: 2022-04-03

## 2022-01-03 ENCOUNTER — OFFICE VISIT (OUTPATIENT)
Dept: LAB | Facility: HOSPITAL | Age: 47
End: 2022-01-03
Payer: MEDICARE

## 2022-01-03 ENCOUNTER — HOSPITAL ENCOUNTER (OUTPATIENT)
Dept: RADIOLOGY | Facility: HOSPITAL | Age: 47
Discharge: HOME | End: 2022-01-03
Payer: MEDICARE

## 2022-01-03 ENCOUNTER — PREP FOR PROCEDURE (OUTPATIENT)
Dept: GASTROENTEROLOGY | Facility: MEDICAL CENTER | Age: 47
End: 2022-01-03

## 2022-01-03 DIAGNOSIS — K21.9 GASTROESOPHAGEAL REFLUX DISEASE, UNSPECIFIED WHETHER ESOPHAGITIS PRESENT: Primary | ICD-10-CM

## 2022-01-03 DIAGNOSIS — Z98.2 HISTORY OF BRAIN SHUNT: ICD-10-CM

## 2022-01-03 DIAGNOSIS — Z98.2 S/P VENTRICULAR SHUNT PLACEMENT: ICD-10-CM

## 2022-01-03 PROCEDURE — 71046 X-RAY EXAM CHEST 2 VIEWS: CPT

## 2022-01-03 PROCEDURE — 93005 ELECTROCARDIOGRAM TRACING: CPT

## 2022-01-04 LAB
ATRIAL RATE: 62 BPM
ATRIAL RATE: 62 BPM
PR INTERVAL: 122 MS
PR INTERVAL: 140 MS
QRS AXIS: 177 DEGREES
QRS AXIS: 178 DEGREES
QRSD INTERVAL: 64 MS
QRSD INTERVAL: 68 MS
QT INTERVAL: 408 MS
QT INTERVAL: 418 MS
QTC INTERVAL: 414 MS
QTC INTERVAL: 424 MS
T WAVE AXIS: 171 DEGREES
T WAVE AXIS: 173 DEGREES
VENTRICULAR RATE: 62 BPM
VENTRICULAR RATE: 62 BPM

## 2022-01-04 PROCEDURE — 93010 ELECTROCARDIOGRAM REPORT: CPT | Performed by: INTERNAL MEDICINE

## 2022-01-07 ENCOUNTER — TELEPHONE (OUTPATIENT)
Dept: INTERNAL MEDICINE CLINIC | Facility: CLINIC | Age: 47
End: 2022-01-07

## 2022-01-07 NOTE — TELEPHONE ENCOUNTER
Patient calling back stating that she is in pain and the AZO is not helping at all and would appreciate if we were able to send something in

## 2022-01-07 NOTE — TELEPHONE ENCOUNTER
Patient called asking for medication for a uti started Yesterday, C/O frequency little out put of urine and burning

## 2022-01-08 ENCOUNTER — DOCUMENTATION (OUTPATIENT)
Dept: OTHER | Facility: HOSPITAL | Age: 47
End: 2022-01-08

## 2022-01-08 DIAGNOSIS — R30.0 DYSURIA: Primary | ICD-10-CM

## 2022-01-08 RX ORDER — SULFAMETHOXAZOLE AND TRIMETHOPRIM 800; 160 MG/1; MG/1
1 TABLET ORAL EVERY 12 HOURS SCHEDULED
Qty: 6 TABLET | Refills: 0 | Status: SHIPPED | OUTPATIENT
Start: 2022-01-08 | End: 2022-01-11

## 2022-01-17 ENCOUNTER — TELEPHONE (OUTPATIENT)
Dept: GASTROENTEROLOGY | Facility: CLINIC | Age: 47
End: 2022-01-17

## 2022-01-17 NOTE — TELEPHONE ENCOUNTER
Patients GI provider:  Dr Marshall Esha    Number to return call: [phone]    Reason for call: Pt calling to schedule repeat EGD w/ Bravo    Scheduled procedure/appointment date if applicable:  N/A

## 2022-01-19 NOTE — PHYSICAL THERAPY NOTE
"Time of notification: 6:01 AM  Provider notified: [id number]  Patient status: \"KW, 6236-1. Pt had a 14 beat run of SVT () at 0330. BP at 0600: 195/104.\"  Temp:  [98.2  F (36.8  C)-99.5  F (37.5  C)] 98.3  F (36.8  C)  Pulse:  [] 78  Resp:  [16-18] 16  BP: (164-214)/() 195/104  SpO2:  [80 %-97 %] 96 %  Orders received: Provider ordered second dose of clonidine and to give coreg early. Will continue to monitor.   " Physical Therapy Cancellation Note  The pt  Was attempted, but working with nursing  The pt  Is now off of the floor at CT scan  Will continue to follow      Jacque Goetz PTA

## 2022-01-20 NOTE — TELEPHONE ENCOUNTER
Pt was very upset with this response she said [place] cant get off [place] bc its icy and also does not want to be in faciliites bc of not being vaccinated  And her health issues  Is there anything you can recommend?

## 2022-01-20 NOTE — TELEPHONE ENCOUNTER
She is having symptoms again burning, frequency, but only a few drops comes out  She is asking for medication to be sent again? Also asking if bc she changed her laundry soap is this could be why she is getting them,

## 2022-01-21 NOTE — TELEPHONE ENCOUNTER
Maury to [facility] in GI lab - inquiring re: availability for Dr Cici Ma to do EGD w/ Bravo during his hospitalist week 2/14-18

## 2022-01-24 NOTE — TELEPHONE ENCOUNTER
TigerText with Simin Burn at GI lb on Friday, 1/21/22 - ok to add pt to Dr Van Pereira schedule on 2/18/22, at [place] [address] lab for EGD Bravo  TC to pt to confirm availability for 2/18 procedure  Pt confirmed her availability       Scheduled date of EGD Bravo (as of today):  2/18/22  Physician performing EGD:  Dr Ramirez Sharif  Location of EGD:  [facility]  Instructions reviewed with patient by:  Ruma Jaimes - mailed to pt  Clearances:  n/a

## 2022-02-15 ENCOUNTER — TELEPHONE (OUTPATIENT)
Dept: HEMATOLOGY ONCOLOGY | Facility: CLINIC | Age: 47
End: 2022-02-15

## 2022-02-15 NOTE — TELEPHONE ENCOUNTER
I spoke with the patient in regards to her lab work that needs to be completed prior to her appointment on 2/17/22 at 2:00pm  Informed the patient the lab orders are in the system so she can go to any [facility]'s lab to have her blood work completed  Patient states she will get them done

## 2022-02-17 ENCOUNTER — TELEPHONE (OUTPATIENT)
Dept: GASTROENTEROLOGY | Facility: HOSPITAL | Age: 47
End: 2022-02-17

## 2022-02-17 ENCOUNTER — APPOINTMENT (OUTPATIENT)
Dept: LAB | Facility: CLINIC | Age: 47
End: 2022-02-17
Payer: MEDICARE

## 2022-02-17 DIAGNOSIS — D50.8 IRON DEFICIENCY ANEMIA SECONDARY TO INADEQUATE DIETARY IRON INTAKE: ICD-10-CM

## 2022-02-17 DIAGNOSIS — D50.9 MICROCYTIC ANEMIA: ICD-10-CM

## 2022-02-17 LAB
ALBUMIN SERPL BCP-MCNC: 4.1 G/DL (ref 3.5–5)
ALP SERPL-CCNC: 59 U/L (ref 46–116)
ALT SERPL W P-5'-P-CCNC: 21 U/L (ref 12–78)
ANION GAP SERPL CALCULATED.3IONS-SCNC: 3 MMOL/L (ref 4–13)
AST SERPL W P-5'-P-CCNC: 14 U/L (ref 5–45)
BASOPHILS # BLD AUTO: 0.05 THOUSANDS/ΜL (ref 0–0.1)
BASOPHILS NFR BLD AUTO: 1 % (ref 0–1)
BILIRUB SERPL-MCNC: 0.45 MG/DL (ref 0.2–1)
BUN SERPL-MCNC: 20 MG/DL (ref 5–25)
CALCIUM SERPL-MCNC: 9.7 MG/DL (ref 8.3–10.1)
CHLORIDE SERPL-SCNC: 106 MMOL/L (ref 100–108)
CO2 SERPL-SCNC: 30 MMOL/L (ref 21–32)
CREAT SERPL-MCNC: 0.7 MG/DL (ref 0.6–1.3)
CRP SERPL QL: <3 MG/L
EOSINOPHIL # BLD AUTO: 0.41 THOUSAND/ΜL (ref 0–0.61)
EOSINOPHIL NFR BLD AUTO: 6 % (ref 0–6)
ERYTHROCYTE [DISTWIDTH] IN BLOOD BY AUTOMATED COUNT: 13.3 % (ref 11.6–15.1)
ERYTHROCYTE [SEDIMENTATION RATE] IN BLOOD: 5 MM/HOUR (ref 0–19)
FERRITIN SERPL-MCNC: 50 NG/ML (ref 8–388)
FOLATE SERPL-MCNC: 7.3 NG/ML (ref 3.1–17.5)
GFR SERPL CREATININE-BSD FRML MDRD: 103 ML/MIN/1.73SQ M
GLUCOSE SERPL-MCNC: 98 MG/DL (ref 65–140)
HCT VFR BLD AUTO: 47.8 % (ref 34.8–46.1)
HGB BLD-MCNC: 15.5 G/DL (ref 11.5–15.4)
IGA SERPL-MCNC: 126 MG/DL (ref 70–400)
IGG SERPL-MCNC: 990 MG/DL (ref 700–1600)
IGM SERPL-MCNC: 76 MG/DL (ref 40–230)
IMM GRANULOCYTES # BLD AUTO: 0.02 THOUSAND/UL (ref 0–0.2)
IMM GRANULOCYTES NFR BLD AUTO: 0 % (ref 0–2)
IRON SATN MFR SERPL: 23 % (ref 15–50)
IRON SERPL-MCNC: 72 UG/DL (ref 50–170)
LDH SERPL-CCNC: 174 U/L (ref 81–234)
LYMPHOCYTES # BLD AUTO: 1.18 THOUSANDS/ΜL (ref 0.6–4.47)
LYMPHOCYTES NFR BLD AUTO: 18 % (ref 14–44)
MCH RBC QN AUTO: 29.6 PG (ref 26.8–34.3)
MCHC RBC AUTO-ENTMCNC: 32.4 G/DL (ref 31.4–37.4)
MCV RBC AUTO: 91 FL (ref 82–98)
MONOCYTES # BLD AUTO: 0.31 THOUSAND/ΜL (ref 0.17–1.22)
MONOCYTES NFR BLD AUTO: 5 % (ref 4–12)
NEUTROPHILS # BLD AUTO: 4.58 THOUSANDS/ΜL (ref 1.85–7.62)
NEUTS SEG NFR BLD AUTO: 70 % (ref 43–75)
NRBC BLD AUTO-RTO: 0 /100 WBCS
PLATELET # BLD AUTO: 203 THOUSANDS/UL (ref 149–390)
PMV BLD AUTO: 10.4 FL (ref 8.9–12.7)
POTASSIUM SERPL-SCNC: 4.4 MMOL/L (ref 3.5–5.3)
PROT SERPL-MCNC: 7.3 G/DL (ref 6.4–8.2)
RBC # BLD AUTO: 5.23 MILLION/UL (ref 3.81–5.12)
RETICS # AUTO: NORMAL 10*3/UL (ref 14097–95744)
RETICS # CALC: 1.3 % (ref 0.37–1.87)
SODIUM SERPL-SCNC: 139 MMOL/L (ref 136–145)
TIBC SERPL-MCNC: 307 UG/DL (ref 250–450)
VIT B12 SERPL-MCNC: 373 PG/ML (ref 100–900)
WBC # BLD AUTO: 6.55 THOUSAND/UL (ref 4.31–10.16)

## 2022-02-17 PROCEDURE — 82746 ASSAY OF FOLIC ACID SERUM: CPT

## 2022-02-17 PROCEDURE — 85025 COMPLETE CBC W/AUTO DIFF WBC: CPT

## 2022-02-17 PROCEDURE — 82525 ASSAY OF COPPER: CPT

## 2022-02-17 PROCEDURE — 83010 ASSAY OF HAPTOGLOBIN QUANT: CPT

## 2022-02-17 PROCEDURE — 83550 IRON BINDING TEST: CPT

## 2022-02-17 PROCEDURE — 83615 LACTATE (LD) (LDH) ENZYME: CPT

## 2022-02-17 PROCEDURE — 84165 PROTEIN E-PHORESIS SERUM: CPT | Performed by: PATHOLOGY

## 2022-02-17 PROCEDURE — 82784 ASSAY IGA/IGD/IGG/IGM EACH: CPT

## 2022-02-17 PROCEDURE — 83521 IG LIGHT CHAINS FREE EACH: CPT

## 2022-02-17 PROCEDURE — 85652 RBC SED RATE AUTOMATED: CPT

## 2022-02-17 PROCEDURE — 85045 AUTOMATED RETICULOCYTE COUNT: CPT

## 2022-02-17 PROCEDURE — 86140 C-REACTIVE PROTEIN: CPT

## 2022-02-17 PROCEDURE — 84165 PROTEIN E-PHORESIS SERUM: CPT

## 2022-02-17 PROCEDURE — 83540 ASSAY OF IRON: CPT

## 2022-02-17 PROCEDURE — 80053 COMPREHEN METABOLIC PANEL: CPT

## 2022-02-17 PROCEDURE — 36415 COLL VENOUS BLD VENIPUNCTURE: CPT

## 2022-02-17 PROCEDURE — 82607 VITAMIN B-12: CPT

## 2022-02-17 PROCEDURE — 82728 ASSAY OF FERRITIN: CPT

## 2022-02-18 ENCOUNTER — ANESTHESIA EVENT (OUTPATIENT)
Dept: GASTROENTEROLOGY | Facility: HOSPITAL | Age: 47
End: 2022-02-18

## 2022-02-18 ENCOUNTER — ANESTHESIA (OUTPATIENT)
Dept: GASTROENTEROLOGY | Facility: HOSPITAL | Age: 47
End: 2022-02-18

## 2022-02-18 ENCOUNTER — HOSPITAL ENCOUNTER (OUTPATIENT)
Dept: GASTROENTEROLOGY | Facility: HOSPITAL | Age: 47
Setting detail: OUTPATIENT SURGERY
Discharge: HOME | End: 2022-02-18
Attending: INTERNAL MEDICINE | Admitting: INTERNAL MEDICINE
Payer: MEDICARE

## 2022-02-18 VITALS
HEIGHT: 63 IN | BODY MASS INDEX: 25.34 KG/M2 | SYSTOLIC BLOOD PRESSURE: 108 MMHG | HEART RATE: 59 BPM | RESPIRATION RATE: 16 BRPM | TEMPERATURE: 96.8 F | WEIGHT: 143 LBS | DIASTOLIC BLOOD PRESSURE: 62 MMHG | OXYGEN SATURATION: 99 %

## 2022-02-18 DIAGNOSIS — K21.9 GASTROESOPHAGEAL REFLUX DISEASE, UNSPECIFIED WHETHER ESOPHAGITIS PRESENT: ICD-10-CM

## 2022-02-18 LAB
KAPPA LC FREE SER-MCNC: 14.2 MG/L (ref 3.3–19.4)
KAPPA LC FREE/LAMBDA FREE SER: 1.29 {RATIO} (ref 0.26–1.65)
LAMBDA LC FREE SERPL-MCNC: 11 MG/L (ref 5.7–26.3)

## 2022-02-18 PROCEDURE — 43235 EGD DIAGNOSTIC BRUSH WASH: CPT | Performed by: INTERNAL MEDICINE

## 2022-02-18 RX ORDER — PROPOFOL 10 MG/ML
INJECTION, EMULSION INTRAVENOUS AS NEEDED
Status: DISCONTINUED | OUTPATIENT
Start: 2022-02-18 | End: 2022-02-18

## 2022-02-18 RX ORDER — SODIUM CHLORIDE 9 MG/ML
INJECTION, SOLUTION INTRAVENOUS CONTINUOUS PRN
Status: DISCONTINUED | OUTPATIENT
Start: 2022-02-18 | End: 2022-02-18

## 2022-02-18 RX ORDER — LIDOCAINE HYDROCHLORIDE 10 MG/ML
INJECTION, SOLUTION EPIDURAL; INFILTRATION; INTRACAUDAL; PERINEURAL AS NEEDED
Status: DISCONTINUED | OUTPATIENT
Start: 2022-02-18 | End: 2022-02-18

## 2022-02-18 RX ADMIN — SODIUM CHLORIDE: 0.9 INJECTION, SOLUTION INTRAVENOUS at 11:06

## 2022-02-18 RX ADMIN — LIDOCAINE HYDROCHLORIDE 70 MG: 10 INJECTION, SOLUTION EPIDURAL; INFILTRATION; INTRACAUDAL; PERINEURAL at 11:11

## 2022-02-18 RX ADMIN — PROPOFOL 50 MG: 10 INJECTION, EMULSION INTRAVENOUS at 11:19

## 2022-02-18 RX ADMIN — PROPOFOL 50 MG: 10 INJECTION, EMULSION INTRAVENOUS at 11:13

## 2022-02-18 RX ADMIN — PROPOFOL 100 MG: 10 INJECTION, EMULSION INTRAVENOUS at 11:11

## 2022-02-18 RX ADMIN — PROPOFOL 50 MG: 10 INJECTION, EMULSION INTRAVENOUS at 11:16

## 2022-02-18 NOTE — ANESTHESIA PREPROCEDURE EVALUATION
Procedure:  EGD  BRAVO PH MONITORING    Relevant Problems   CARDIO   (+) Hypercholesteremia   (+) Migraine with aura and without status migrainosus, not intractable   (+) Murmur, cardiac      GI/HEPATIC   (+) Bariatric surgery status   (+) Gastroesophageal reflux disease      /RENAL   (+) Hydroureteronephrosis   (+) Renal calculi      HEMATOLOGY   (+) Anemia   (+) Iron deficiency anemia secondary to inadequate dietary iron intake      NEURO/PSYCH   (+) Chronic tension-type headache, intractable   (+) History of idiopathic intracranial hypertension   (+) Migraine with aura and without status migrainosus, not intractable   (+) PTSD (post-traumatic stress disorder)      Other   (+) COVID-19 vaccination refused   (+) Moderate protein-calorie malnutrition (HCC)   (+) Postgastrectomy malabsorption   (+) Pseudotumor cerebri      Discussed COVID vaccination and answered questions about vaccination, COVID illness, and risk, benefit and alternative  She will consider  Physical Exam    Airway    Mallampati score: II  TM Distance: >3 FB  Neck ROM: full     Dental   No notable dental hx     Cardiovascular  Rhythm: regular, Rate: normal,     Pulmonary  Breath sounds clear to auscultation,     Other Findings        Anesthesia Plan  ASA Score- 3     Anesthesia Type- IV sedation with anesthesia with ASA Monitors  Additional Monitors:   Airway Plan:           Plan Factors-Exercise tolerance (METS): >4 METS  Chart reviewed  EKG reviewed  Imaging results reviewed  Existing labs reviewed  Patient summary reviewed  Patient is not a current smoker  Induction- intravenous  Postoperative Plan-     Informed Consent- Anesthetic plan and risks discussed with patient  I personally reviewed this patient with the CRNA  Discussed and agreed on the Anesthesia Plan with the CRNA  Ban Vinson

## 2022-02-18 NOTE — H&P
History and Physical - [facility]  Tracy Rodriguez 51 y o  female MRN: [id number]    HPI: Tracy Rodriguez is a 51y o  year old female who presents with gerd/ rendon placement  Review of Systems    Historical Information   Past Medical History:   Diagnosis Date    Abdominal pain     Brain condition     Pseudotumor Cerebri     Chronic kidney disease     De Quervain's disease (tenosynovitis)     Esophageal perforation     Gastric leak     GERD (gastroesophageal reflux disease)     Headache     Idiopathic intracranial hypertension     Kidney stone     Migraine     No blood products     per pt: personal and Confucianism beliefs  surgeon office aware 12/13/19    Papilledema, both eyes     PONV (postoperative nausea and vomiting)     Postgastrectomy malabsorption     Presence of lumboperitoneal shunt     Resolved: Sep 20, 2017    Rotator cuff tendinitis     Resolved: Aug 23, 2017    Visual field defect      Past Surgical History:   Procedure Laterality Date    CSF SHUNT      LP shunt - 2015 -  shunt - 2017    ESOPHAGOGASTRODUODENOSCOPY N/A 2/23/2018    Procedure: ESOPHAGOGASTRODUODENOSCOPY (EGD) WITH ESOPHAGEAL STENT PLACEMENT;  Surgeon: Romy Harrison MD;  [facility];  Service: Thoracic    ESOPHAGOGASTRODUODENOSCOPY N/A 2/23/2018    Procedure: ESOPHAGOGASTRODUODENOSCOPY (EGD) WITH REMOVAL ESOPHAGEAL STENT  AND REPLACEMENT WITH 23mm X155mm 1111 [address] [place] [address],[address];  Surgeon: Romy Harrison MD;  [facility];  Service: Thoracic    ESOPHAGOGASTRODUODENOSCOPY N/A 3/28/2018    Procedure: ESOPHAGOGASTRODUODENOSCOPY (EGD) with PEJ placement ;  Surgeon: Phil Dunn MD;  [facility]; Service: Gastroenterology    ESOPHAGOGASTRODUODENOSCOPY N/A 4/5/2018    Procedure: ESOPHAGOGASTRODUODENOSCOPY (EGD) with botox injection and kaofed placement;  Surgeon: Leonarda Mcfarland DO;  [facility];   Service: Gastroenterology    ESOPHAGOGASTRODUODENOSCOPY N/A 4/10/2018    Procedure: ESOPHAGOGASTRODUODENOSCOPY (EGD) with Kaofed placement;  Surgeon: Ephraim Albarado MD;  [facility]; Service: Gastroenterology    ESOPHAGOSCOPY WITH STENT INSERTION N/A 1/24/2018    Procedure: INSERTION STENT ESOPHAGEAL;  Surgeon: Rodrigo Delatorre MD;  [facility]; Service: Gastroenterology    EYE SURGERY Bilateral 1980    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)     FL RETROGRADE PYELOGRAM  6/24/2020    FL RETROGRADE PYELOGRAM  8/21/2021    GASTRIC BYPASS  12/19/2016    Lap sleeve gastrectomy w/shunt length shortening procedure    HIATAL HERNIA REPAIR      HYSTERECTOMY  03/14/2013    IR LUMBAR PUNCTURE  8/29/2018    LAPAROTOMY N/A 1/25/2018    Procedure: Exploratory Laparotomy, wash out,placement of drains, placement of NG feeding tube ;   Surgeon: Katie Campos DO;  [facility];  Service: General    LUMBAR PERITONEAL SHUNT  11/19/2015    Laparoscopic assisted    WY CREATE SHUNT:VENTRIC-ATRIAL Right 12/18/2019    Procedure: IMAGE GUIDED INSERTION OF RIGHT CORONAL VENTRICULAR-ATRIAL SHUNT;  Surgeon: Roda Eisenmenger, MD;  [facility];  Service: Neurosurgery    WY CREATE SHUNT:VENTRIC-PERITONEAL Right 5/31/2017    Procedure: IMAGE GUIDED CORONAL PLACEMENT OF PROGRAMABLE VENTRICULAR-PERITONEAL SHUNT, REMOVAL OF LP SHUNT ;  Surgeon: Roda Eisenmenger, MD;  [facility];  Service: Neurosurgery    WY CYSTO/URETERO W/LITHOTRIPSY &INDWELL STENT INSRT Bilateral 6/24/2020    Procedure: CYSTOSCOPY URETEROSCOPY WITH LITHOTRIPSY HOLMIUM LASER, RETROGRADE PYELOGRAM AND exchange bilateral  STENTs URETERAL;  Surgeon: Juice Iniguez MD;  [facility];  Service: Urology    WY CYSTO/URETERO W/LITHOTRIPSY &INDWELL STENT INSRT Left 8/21/2021    Procedure: CYSTOSCOPY; LEFT URETEROSCOPY WITH RETROGRADE PYELOGRAM, REMOVAL OF STONE AND INSERTION LEFT URETERAL STENT;  Surgeon: Delio Giordano MD;  [facility];  Service: Urology  IL CYSTOURETHROSCOPY,URETER CATHETER N/A 2020    Procedure: Bilateral CYSTOSCOPY RETROGRADE PYELOGRAM WITH INSERTION STENT URETERAL;  Surgeon: Indio Rabago MD;  Location: [address] [place] [address] MAIN OR;  Service: Urology    IL EGD TRANSORAL BIOPSY SINGLE/MULTIPLE N/A 2018    Procedure: ESOPHAGOGASTRODUODENOSCOPY (EGD) with padlock clip placement;  Surgeon: Mukesh Dumont MD;  [facility];   Service: Doc Rhein CSF SHUNT,W/O REPLACE Right 2018    Procedure: Removal of  shunt;  Surgeon: Sharifa Bhnadari MD;  [facility];  Service: Neurosurgery    IL REPLACEMENT/REVISION,CSF SHUNT Right 2018    Procedure: Externalization of right-sided SHUNT VENTRICULAR-PERITONEAL in anterior chest wall ribs two and three level  ;  Surgeon: Monet Douglas MD;  [facility];  Service: Neurosurgery    WRIST SURGERY Right     x3 ,      Social History   Social History     Substance and Sexual Activity   Alcohol Use Never     Social History     Substance and Sexual Activity   Drug Use Yes    Frequency: 7 0 times per week    Types: Marijuana    Comment: medical marijuana, vaping & topical      Social History     Tobacco Use   Smoking Status Former Smoker    Packs/day: 0 50    Years: 20 00    Pack years: 10 00    Types: Cigarettes    Quit date: 2012    Years since quittin 4   Smokeless Tobacco Never Used     Family History   Problem Relation Age of Onset    No Known Problems Mother     No Known Problems Father     Thyroid cancer Brother     Colon cancer Maternal Grandfather     Cancer Paternal Grandmother     Cancer Paternal Grandfather     Cancer Family         Gastric    Leukemia Family     Colon cancer Family     Pancreatic cancer Family        Meds/Allergies     (Not in a hospital admission)      Allergies   Allergen Reactions    Benadryl [Diphenhydramine] Anaphylaxis     Throat closing    Phenergan [Promethazine] Anaphylaxis       Objective /83   Pulse 58   Temp 97 9 °F (36 6 °C) (Tympanic)   Resp 18   Ht 5' 3" (1 6 m)   Wt 64 9 kg (143 lb)   LMP  (LMP Unknown)   SpO2 97%   BMI 25 33 kg/m²       PHYSICAL EXAM    Gen: NAD  CV: RRR  CHEST: Clear  ABD: soft, NT/ND  EXT: no edema  Neuro: AAO      ASSESSMENT/PLAN:  This is a 51y o  year old female here for EGD for bravo placement       PLAN:   Procedure: Edita Frank

## 2022-02-19 LAB — HAPTOGLOB SERPL-MCNC: 53 MG/DL (ref 42–296)

## 2022-02-20 LAB
ALBUMIN SERPL ELPH-MCNC: 4.9 G/DL (ref 3.5–5)
ALBUMIN SERPL ELPH-MCNC: 67.1 % (ref 52–65)
ALPHA1 GLOB SERPL ELPH-MCNC: 0.27 G/DL (ref 0.1–0.4)
ALPHA1 GLOB SERPL ELPH-MCNC: 3.7 % (ref 2.5–5)
ALPHA2 GLOB SERPL ELPH-MCNC: 0.56 G/DL (ref 0.4–1.2)
ALPHA2 GLOB SERPL ELPH-MCNC: 7.7 % (ref 7–13)
BETA GLOB ABNORMAL SERPL ELPH-MCNC: 0.36 G/DL (ref 0.4–0.8)
BETA1 GLOB SERPL ELPH-MCNC: 4.9 % (ref 5–13)
BETA2 GLOB SERPL ELPH-MCNC: 3.3 % (ref 2–8)
BETA2+GAMMA GLOB SERPL ELPH-MCNC: 0.24 G/DL (ref 0.2–0.5)
GAMMA GLOB ABNORMAL SERPL ELPH-MCNC: 0.97 G/DL (ref 0.5–1.6)
GAMMA GLOB SERPL ELPH-MCNC: 13.3 % (ref 12–22)
IGG/ALB SER: 2.04 {RATIO} (ref 1.1–1.8)
PROT PATTERN SERPL ELPH-IMP: ABNORMAL
PROT SERPL-MCNC: 7.3 G/DL (ref 6.4–8.2)

## 2022-02-22 LAB — COPPER SERPL-MCNC: 109 UG/DL (ref 80–158)

## 2022-02-25 NOTE — PROGRESS NOTES
Progress Note - ICU Transfer to Step down/[facility]  surg  - Aprilaustin Stevens 38 y o  female MRN: [id number]    Unit/Bed#: [facility] 091-62 Encounter: [id number]  Patient discussed with Dr Jeremy Candelaria per Dr Kelly Harris    Code Status: Level 1 - Full Code    Date of ICU admission: 12/18    Reason for ICU adm: Pseudotumor cerebri status post image guided insertion of right coronal ventricular atrial shunt     Active problems:   Patient Active Problem List   Diagnosis    Pseudotumor cerebri    Acute peritonitis    S/P  shunt    Infection of  (ventriculoperitoneal) shunt, subsequent encounter    Murmur, cardiac    Refusal of blood product    Gastroesophageal reflux disease    Choroidal nevus, right eye    Moderate protein-calorie malnutrition ([address] )    Postoperative intra-abdominal abscess    Abdominal wound dehiscence    Neutrophilic leukocytosis    Left-sided chest wall pain    Bariatric surgery status    Refusal of influenza vaccine by provider    Renal stones    Depression    Diarrhea    History of idiopathic intracranial hypertension    Positional headache    Chronic tension-type headache, intractable    Regurgitation of food    Postgastrectomy malabsorption    Papilledema       Summary of clinical course: See Progress note 12/18  "The patient is a 48-year-old female significant past medical history including a diagnosis of pseudotumor cerebri in September 2015 status post laparoscopic assisted lumbar peritoneal shunt in November 2015 that was revised to a  shunt in May 2017 (patient had chronic lower back pain / discomfort from original LPS)  In February 2018 her  shunt was removed secondary to sepsis due to complications from an attempted hiatal hernia repair resulting in gastric perforation and peritonitis  The patient has recently been complaining of similar symptoms similar to her prior presentation before being diagnosed with pseudotumor    She was recently evaluated by Ophthalmology and Admission Reconciliation is Not Complete  Discharge Reconciliation is Completed found to have papilledema  She was evaluated by Neurosurgery which confirmed recurrent symptomatic pseudotumor cerebri  Per Neurosurgery recommendations, a ventricular atrial shunt was scheduled for today 12/18  Surgery was uncomplicated with minimal blood loss  She remains alert and oriented with no neuro deficits following the surgery      Of note, the patient also has a history of morbid obesity status post laparoscopic vertical sleeve gastrectomy in December 2016 who underwent an exploratory laparotomy  For repair of gastric perforation in January 2018 at [address] [place] [address] after an injury to the gastric sleeve occurred during attempted hiatal hernia repair  Patient developed an intra-abdominal abscess with enterocutaneous fistula formation which resolved with percutaneous drainage  Also with history of EGD with gastroesophageal stent placement in January 2018 status post removal of initial stent in February with an additional 2nd stent placed in the esophagus"    Recent or scheduled procedures: none    Outstanding/pending diagnostics: repeat [facility] 4575 discharge planning:  PT/OT    Per Progress note 12/18- Karlos Szymanski    Assessment and Plan:     Neuro:   · Diagnosis:  Pseudotumor cerebri status post image guided insertion of right coronal ventricular atrial shunt;   CSF studies demonstrating  Elevated WBC at 42,  Elevated  RBC  24,000,  CSF glucose 71,  CSF protein 43;  Chest x-ray 12/18 demonstrating to the ventricular atrial catheter appearing to be in the SVC, superior to the right atrium  ? Plan:   Continue vancomycin 1000 mg Q 12  as scheduled with last dose  for 3:00 a m  12/19  ? Repeat CT head in the morning  ?  follow up CSF culture and Gram stain  ?  seizure precautions /   Frequent neuro checks  Every hour/  Out of bed as tolerated  ?  wound care  ?  continue  Half normal saline at 100ml/hr  ? spironolactone  12 5 mg  ? PT OT  ?  follow-up ophthalmology outpatient  ?    Pain meds as needed  § acetaminophen 650 mg Q 4  §  oxycodone 5 mg Q 4 p r n  Moderate pain  §  oxycodone 10 mg Q 4 p r n  Severe pain  §  Dilaudid 0 5 mg q 1 hour p r n  Breakthrough pain  · Diagnosis:  papilledema  ? Plan:  Follow-up outpatient ophthalmology  ? patient unable to tolerate Diamox due to kidney stones in the past  ? Other ocular history includes strabismus surgery on both eyes for amblyopia as a child,  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; Choroidal nevus        CV:   · Diagnosis: No active issues at this time  Plan:  Continue to monitor on telemetry        Pulm:  Diagnosis:   Chest x-ray 12/18 demonstrating small amount of right basilar pleural fluid or thickening; Otherwise unremarkable chest  -  Continue out of bed as tolerated     GI:   · Diagnosis:  Laparoscopic vertical sleeve gastrectomy   ? Plan:  Continue to monitor  ?  continue thiamine/calcium carbonate/ vitamin B12  ?  continue bowel regimen  · Diagnosis:  Hiatal hernia  ? Plan:  Pantoprazole 40 mg  ?  sucralfate 1000 mg Q 4        :   ?  history of renal stones  §  patient was initially on Diamox for her pseudotumor cerebri, however developed a kidney stone and was put on  Spironolactone instead    Avoid Diamox and Topamax     otherwise no active issues at this time        F/E/N:   · Plan:  Follow-up labs        Heme/Onc:  Patient refuses blood products for "personal beliefs"  ? Diagnosis:  Follow-up labs        Endo:   ? Diagnosis:  No active issues at this time        ID:   · Diagnosis:  Elevated CSF WBC of 42  ? Plan:  Follow up CSF culture and Gram stain  · Diagnosis:  Elevated CSF RBC 24,000  ? Plan:  Continue to monitor        MSK/Skin:   · Diagnosis:  No active issues at this time     Disposition: Continue Critical Care   Code Status: Prior  --------------------------------------------------------------------------------------------------------------  Review of Systems   All other systems reviewed and are negative     patient complaining of pain at the incision site described as "squeezing"   double vision which she attributes to being tired,  light sensitivity   nausea   denies vomiting   denies fever aches or chills   denies chest pain, shortness breath, numbness or tingling     A 12-point, complete review of systems was reviewed and negative except as stated above      Physical Exam   Constitutional: She is oriented to person, place, and time  She appears well-developed and well-nourished  No distress  Patient lying in bed with eyes closed,  Minimally talkative   HENT:   Head: Normocephalic and atraumatic  Right Ear: External ear normal    Left Ear: External ear normal    Nose: Nose normal    Mouth/Throat: Oropharynx is clear and moist  No oropharyngeal exudate  Incision site clean   Eyes: Pupils are equal, round, and reactive to light  Conjunctivae are normal  Right eye exhibits no discharge  Left eye exhibits no discharge  No scleral icterus  Pinpoint pupils 1 mm  OU,  PERRLA,  EOMI,  Diplopia at near, esotropia OS   Neck: Normal range of motion  Cardiovascular: Normal rate, regular rhythm, normal heart sounds and intact distal pulses  Exam reveals no gallop and no friction rub  No murmur heard  Pulmonary/Chest: Effort normal and breath sounds normal  No stridor  No respiratory distress  She has no wheezes  Abdominal: Soft  Bowel sounds are normal  She exhibits no distension  There is no tenderness  Musculoskeletal: Normal range of motion  She exhibits no edema  Neurological: She is alert and oriented to person, place, and time  Cranial nerves 2-12 grossly intact,  Sensation intact,  Strength  Testing 5/5 upper and lower extremities bilaterally   Skin: She is not diaphoretic  Psychiatric: She has a normal mood and affect     Nursing note and vitals reviewed        Karlos Szymanski

## 2022-02-25 NOTE — PROGRESS NOTES
Progress Note - Infectious Disease   Michelle Salas 36 y o  female MRN: [id number]  Unit/Bed#: [facility] 834-01 Encounter: [id number]      Impression/Recommendations:  1   Intra-abdominal/pelvic abscesses   Patient is status post multiple abdominal washouts   She has multiple drains in place   She is status post placement of drainage in a new perisplenic collection   She is clinically well and systemically stable  Operative cultures consistently growing  only ampicillin susceptible Enterococcus   A single culture also grew Saccharomyces, most likely colonization   Patient had a new drain placed in left abdomen yesterday   WBC decreasing again since this last drainage    Continue with IV Unasyn/fluconazole  Continue drainage  Treat x at least 7 days from last drainage, at least another 6 days      2   Possible infected  shunt   Given presence of tip of  shunt in peritoneal space, there is high probability of  shunt infection   Fortunately, patient has no symptoms concerning for meningitis   She has neurological deficits  Shai Quiñones is status post tapping of  shunt at [facility]   CSF culture there had no growth but patient had prior antibiotic  Tyree Mcgee will go ahead and treat her for possible/presumptive  shunt infection   Patient is now status post  shunt resection   CSF culture here also negative   With removal shunt, antibiotic course can be decreased     Antibiotic plan as in above  Treat x 2 post VPS resection      3   Gastric perforation, status post repair, with persistent leak   She is now status post placement of esophageal stent   Unfortunately, repeat abdomen/pelvis CT showed recurrent leak with new perisplenic collection   She is now status post placement of duodenal feeding tube and drainage of the perisplenic collection   Unfortunately, barium swallow shows persistent esophageal leak   Patient is tolerating enteral feeding via feeding tube in duodenum  Monitor for recurrent leak      4  Tdap immunization given. Patient tolerated without incident. See immunization grid for documentation.         Bilateral pleural effusion, status post chest tube placement   This is most likely sympathetic effusion, secondary to intra-abdominal abscesses   Chest tubes have been removed   Patient remains comfortable      5  Recurrent leukocytosis   WBC has improved but still remains in the 20s, with low-grade fever   Enlarging left-sided collection around esophageal leak at site may be responsible for this    WBC decreasing again after further drainage, but increase in slightly home last 2 days  Antibiotic plan as in above  Monitor WBC and temperature closely      6   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   No clinical signs of cellulitis   Patient has no headache    Wound is now sutured   Drainage/leakage appears to be resolving  Monitor      Discussed with the patient and her fiance in detail regarding above plan      Antibiotics:  Unasyn  Fluconazole # 19  POD # 23  Post VPS extraction # 12  Post/drainage # 1     Subjective:  Patient is status post placement of a new drain in left abdomen  She is comfortable  Abdominal pain is improving and controlled     No headache   No chest wall pain   Chest wall with no further leakage  She is awake and alert  Temperature stays down   No chills  She is tolerating antibiotics well   No nausea, vomiting or diarrhea  She is tolerating tube feed well  Objective:  Vitals:  HR:  [81-99] 99  Resp:  [7-23] 20  BP: (130-156)/(67-90) 146/75  SpO2:  [94 %-100 %] 94 %  Temp (24hrs), Av 5 °F (36 9 °C), Min:97 9 °F (36 6 °C), Max:99 °F (37 2 °C)  Current: Temperature: 98 7 °F (37 1 °C)    Physical Exam:     General: Awake, alert, cooperative, no distress  Head:  Incision healed  No drainage  No erythema  No tenderness  Chest:  Incision healing  No drainage  No erythema  No tenderness  Lungs: Expansion symmetric, no rales, no wheezing, respirations unlabored  Heart[de-identified]  Regular rate and rhythm, S1 and S2 normal, stable murmur  Abdomen: Soft, mild distention    Incision clean   No drainage/erythema  Drain still seropurulent  Extremities: Stable mild edema  No erythema/warmth  No ulcer  Nontender to palpation  Skin:  No rash  Invasive Devices     Peripherally Inserted Central Catheter Line            PICC Line 21/36/98 Right Basilic 27 days          Drain            Closed/Suction Drain Left LLQ Bulb 19 Fr  22 days    Closed/Suction Drain Left LUQ Bulb 19 Fr  22 days    Closed/Suction Drain Left Back Bulb 12 Fr  17 days    NG/OG/Enteral Tube Enteral Feeding Tube Right nares 17 days    Closed/Suction Drain Midline;Superior Abdomen Other (Comment) 10 Fr  less than 1 day                Labs studies:   I have personally reviewed pertinent labs  Results from last 7 days  Lab Units 02/17/18  0458 02/16/18  0537 02/15/18  0515   SODIUM mmol/L 142 140 141   POTASSIUM mmol/L 3 6 3 6 3 8   CHLORIDE mmol/L 102 101 102   CO2 mmol/L 34* 35* 34*   ANION GAP mmol/L 6 4 5   BUN mg/dL 16 19 19   CREATININE mg/dL 0 33* 0 37* 0 35*   EGFR ml/min/1 73sq m 136 131 134   GLUCOSE RANDOM mg/dL 162* 173* 170*   CALCIUM mg/dL 9 7 9 4 9 4       Results from last 7 days  Lab Units 02/17/18  0458 02/16/18  0537 02/15/18  0515   WBC Thousand/uL 15 34* 21 81* 20 10*   HEMOGLOBIN g/dL 9 0* 9 2* 9 6*   PLATELETS Thousands/uL 502* 462* 477*       Results from last 7 days  Lab Units 02/12/18  1635   GRAM STAIN RESULT  2+ Polys  No bacteria seen   BODY FLUID CULTURE, STERILE  2+ Growth of Enterococcus faecalis*       Imaging Studies:   I have personally reviewed pertinent imaging study reports and images in PACS  EKG, Pathology, and Other Studies:   I have personally reviewed pertinent reports

## 2022-03-02 ENCOUNTER — TELEPHONE (OUTPATIENT)
Dept: UROLOGY | Facility: MEDICAL CENTER | Age: 47
End: 2022-03-02

## 2022-03-02 ENCOUNTER — HOSPITAL ENCOUNTER (OUTPATIENT)
Dept: RADIOLOGY | Facility: IMAGING CENTER | Age: 47
Discharge: HOME | End: 2022-03-02
Payer: MEDICARE

## 2022-03-02 VITALS — BODY MASS INDEX: 23.92 KG/M2 | WEIGHT: 135 LBS | HEIGHT: 63 IN

## 2022-03-02 DIAGNOSIS — Z12.31 ENCOUNTER FOR SCREENING MAMMOGRAM FOR MALIGNANT NEOPLASM OF BREAST: ICD-10-CM

## 2022-03-02 DIAGNOSIS — R30.0 DYSURIA: Primary | ICD-10-CM

## 2022-03-02 PROCEDURE — 77067 SCR MAMMO BI INCL CAD: CPT

## 2022-03-02 PROCEDURE — 77063 BREAST TOMOSYNTHESIS BI: CPT

## 2022-03-02 NOTE — TELEPHONE ENCOUNTER
Received call from patient   Patient previously seen in our [place] location for Calculus of the Kidney  Last appt was 8/24/21 for cysto/ stent removal with string due to string not being present  Patient was directed to fu in 8-12 with KUB  Patient states over the last two weeks has had burning , frequency and urgency  No, fever, chills, or back or flank pain reported  Patient states that she did have old prescription of bactrim and did take medication, also taking azo  Patient states for the last three days symptoms have been better  Urine now is clear yellow, no odor reported and stream is normal      Patient states she has not taken the antibiotic in 2-3 days and had not taken any azo today       Will send encounter to provider for recommendation

## 2022-03-04 ENCOUNTER — OFFICE VISIT (OUTPATIENT)
Dept: PLASTIC SURGERY | Facility: CLINIC | Age: 47
End: 2022-03-04
Payer: MEDICARE

## 2022-03-04 VITALS
DIASTOLIC BLOOD PRESSURE: 79 MMHG | BODY MASS INDEX: 23.92 KG/M2 | WEIGHT: 135 LBS | SYSTOLIC BLOOD PRESSURE: 128 MMHG | HEIGHT: 63 IN | HEART RATE: 61 BPM | TEMPERATURE: 97.5 F | RESPIRATION RATE: 16 BRPM

## 2022-03-04 DIAGNOSIS — N62 MACROMASTIA: Primary | ICD-10-CM

## 2022-03-04 PROCEDURE — 99203 OFFICE O/P NEW LOW 30 MIN: CPT | Performed by: STUDENT IN AN ORGANIZED HEALTH CARE EDUCATION/TRAINING PROGRAM

## 2022-03-04 NOTE — PROGRESS NOTES
Plastic Surgery Consult    Reason for visit: heavy breasts    HPI:  Patient is a 52 y/o female who presents with symptomatic macromastia  She has significant medical history of gastric sleeve in 2016 with over 110 lb weight loss  Her weight has since been stable over the last year  Of note, she subsequently suffered hiatal hernia, underwent surgery which was complicated by bowel perforation and exlap and was treated for 5 months in the hospital due to sepsis  She also has pseudotumor cerebri with ventriculoatrial shunt  She complains of large, heavy, pendulous breasts that causes her chest, upper back, neck, and shoulder discomfort  She also complains of rashes underneath her breast during humid weather and has difficulty exercising  She has tried supportive bra, ibuprofen with minimal symptomatic improvement  She denies lumps or discharge on self-exams  She currently wears 38DDD and would like to be C cup ideally  Her last mammogram was this month and was normal     ROS: 12 pt ROS negative, except as otherwise noted in HPI  PMH: massive weight loss, sepsis, hiatal hernia, pseudotumor cerebri  FamHx: non-contrib   SurgHx: gastric sleeve (2016), hiatal hernia surgery, exlap, VA shunt  SocHx: no tobacco, ETOH  Meds: no blood thinners, steroids  Allergies: phenergan, benedryl    PE:  Vitals:    03/04/22 1124   BP: 128/79   Pulse: 61   Resp: 16   Temp: 97 5 °F (36 4 °C)       General: NC/AT, breathing comfortably on RA  Neuro: CN II-XII grossly intact, symmetric reflexes  HEENT: PERRLA, EOMI, external ears normal, no lesions or deformities, neck supple, trachea midline  Respiratory: CTAB, normal respiratory effort  Cardio: RRR, normal S1, S2, no murmur, rubs, gallops  GI: soft, non-tender, non-distended  MSK: normal alignment, mobility, gait  Skin: no rashes, lesions, subcutaneous nodules    BMI 24    Breast Exam:  Bilateral moderately large, ptotic breasts with severe Grade III ptosis   R slightly larger than L  Enlarged areolas, normal nipple sensation  Mild shoulder grooving bilaterally  No current intertrigo, moist areas underneath breast  No masses, skin dimpling, or discharge    Measurements:    R  L  SN-N  33 cm  32 cm  N-IMF  19 cm  19 cm     Mammogram: normal    A/P: Patient is a 50 y/o female who presents for symptomatic macromastia s/p massive weight loss  -Patient would benefit from bilateral reduction mammoplasty  Technique: wise-pattern with inferior pedicle with estimated resection weight of 300-350 g per side  Any greater resection would leave her flat chested   -Discussed with patient the risks, benefits, procedure, and complications  Discussed changes/loss/hypersentivity in nipple sensation, diminished breast feeding ability and increase in size of breasts should she become pregnant  Discussed risk of partial vs complete nipple loss due to vascular issues  Discussed that breast reduction will help but may not completely resolve her back, chest, upper neck, and shoulder pain  Due to patient's morbid obesity, I discussed the increased risk of surgical complications including infection, seroma, abscess, wound dehisence  Due to her large pendulous breasts, the possibility of free nipple graft was also discussed    -Also discussed poor tissue quality and increased laxity due to massive weight loss  Discussed recurrent ptosis and possible use of mesh, for which, patient declined mesh  -Patient's questions answered, concerns addressed   -Photos taken, will submit to insurance once completion    -Of note, will need medical clearance, patient has pseudotumor cerebri with VA shunt      Nikos Mendes MD   [facility] Plastic and Reconstructive Surgery   [address], [address]   [address]   Office: [phone]

## 2022-03-07 NOTE — TELEPHONE ENCOUNTER
Called and spoke with patient  Advised that we recommend her going for urine testing  She stated that she had bad pain Saturday and took an AZO and bactrim  She said she will go Thursday to give her urine sample  Advised to increase her water intake and it will take 2-3 days to come back

## 2022-03-10 ENCOUNTER — APPOINTMENT (OUTPATIENT)
Dept: LAB | Facility: CLINIC | Age: 47
End: 2022-03-10
Payer: MEDICARE

## 2022-03-10 ENCOUNTER — OFFICE VISIT (OUTPATIENT)
Dept: HEMATOLOGY ONCOLOGY | Facility: CLINIC | Age: 47
End: 2022-03-10
Payer: MEDICARE

## 2022-03-10 VITALS
BODY MASS INDEX: 26.01 KG/M2 | HEIGHT: 63 IN | OXYGEN SATURATION: 99 % | RESPIRATION RATE: 18 BRPM | TEMPERATURE: 97 F | HEART RATE: 67 BPM | SYSTOLIC BLOOD PRESSURE: 116 MMHG | DIASTOLIC BLOOD PRESSURE: 88 MMHG | WEIGHT: 146.8 LBS

## 2022-03-10 DIAGNOSIS — R30.0 DYSURIA: ICD-10-CM

## 2022-03-10 DIAGNOSIS — E53.8 B12 DEFICIENCY: ICD-10-CM

## 2022-03-10 DIAGNOSIS — D75.1 ERYTHROCYTOSIS: ICD-10-CM

## 2022-03-10 DIAGNOSIS — D50.8 IRON DEFICIENCY ANEMIA SECONDARY TO INADEQUATE DIETARY IRON INTAKE: Primary | ICD-10-CM

## 2022-03-10 DIAGNOSIS — E53.8 FOLIC ACID DEFICIENCY: ICD-10-CM

## 2022-03-10 LAB
BACTERIA UR QL AUTO: ABNORMAL /HPF
BILIRUB UR QL STRIP: NEGATIVE
CAOX CRY URNS QL MICRO: ABNORMAL /HPF
CLARITY UR: ABNORMAL
COLOR UR: YELLOW
GLUCOSE UR STRIP-MCNC: NEGATIVE MG/DL
HGB UR QL STRIP.AUTO: ABNORMAL
KETONES UR STRIP-MCNC: NEGATIVE MG/DL
LEUKOCYTE ESTERASE UR QL STRIP: ABNORMAL
MUCOUS THREADS UR QL AUTO: ABNORMAL
NITRITE UR QL STRIP: NEGATIVE
NON-SQ EPI CELLS URNS QL MICRO: ABNORMAL /HPF
PH UR STRIP.AUTO: 6 [PH]
PROT UR STRIP-MCNC: ABNORMAL MG/DL
RBC #/AREA URNS AUTO: ABNORMAL /HPF
SP GR UR STRIP.AUTO: 1.03 (ref 1–1.03)
UROBILINOGEN UR STRIP-ACNC: 3 MG/DL
WBC #/AREA URNS AUTO: ABNORMAL /HPF

## 2022-03-10 PROCEDURE — 87077 CULTURE AEROBIC IDENTIFY: CPT

## 2022-03-10 PROCEDURE — 81001 URINALYSIS AUTO W/SCOPE: CPT

## 2022-03-10 PROCEDURE — 87086 URINE CULTURE/COLONY COUNT: CPT

## 2022-03-10 PROCEDURE — 87186 SC STD MICRODIL/AGAR DIL: CPT

## 2022-03-10 PROCEDURE — 99214 OFFICE O/P EST MOD 30 MIN: CPT | Performed by: NURSE PRACTITIONER

## 2022-03-10 RX ORDER — FOLIC ACID 1 MG/1
1 TABLET ORAL DAILY
Qty: 90 TABLET | Refills: 4 | Status: SHIPPED | OUTPATIENT
Start: 2022-03-10

## 2022-03-10 NOTE — PROGRESS NOTES
Hematology/Oncology Outpatient Follow-up  Zachary Laurent 51 y o  female 1975 [id number]    Date:  3/10/2022      Assessment and Plan:  1  Iron deficiency anemia secondary to inadequate dietary iron intake  Patient is no longer anemic or iron deficient her ferritin is 50 after completing 6 sessions of IV Venofer November/December 2021  She was given the option of receiving a few more doses of IV iron to see if this would improve her fatigue however she would rather defer for now and continue to monitor her laboratory studies which is appropriate  She continues to have significant fatigue which may be due to other etiologies  We did discuss referral to Sleep Medicine to rule out sleep disorder such as sleep apnea especially in light of her new erythrocytosis  She declined this time but will consider in the future  Request she follow up again with repeat labs in 4 months     - CBC and differential; Future  - Comprehensive metabolic panel; Future  - C-reactive protein; Future  - Sedimentation rate, automated; Future  - Vitamin B12; Future  - Iron Panel (Includes Ferritin, Iron Sat%, Iron, and TIBC); Future  - Ferritin; Future  - Erythropoietin; Future  - Carboxyhemoglobin; Future  - LD,Blood; Future    2  B12 deficiency  Despite taking B12 supplements on a daily basis her B12 continues to be lower than average 373  Recommended that she use sublingual vitamin B12 and make sure dose is at least a 1000 mcg per day  Will trial this if no improvement her next office visit may need to start injections  - Vitamin B12; Future    3  Folic acid deficiency  Patient will be started on folic acid supplements 1 mg daily  Script sent to her local pharmacy  - folic acid (FOLVITE) 1 mg tablet; Take 1 tablet (1 mg total) by mouth daily  Dispense: 90 tablet; Refill: 4  - Folate; Future    4  Erythrocytosis  Patient is now with mild erythrocytosis after IV iron correction    She denies any history of lung disease, quit smoking 13 years ago  She denies any secondhand smoke or exposure to smoke in fumes from fireplaces/etc   She admits that she was may have been somewhat dehydrated when she went for her bloodwork and does [place] higher [place] area which may or may not be contributory  Will continue to monitor closely for now  Check erythropoietin and CO2 before her next office visit  Consider sleep study  Can also check reflexive JAK2 mutational studies in the future if it persists or worsens     - CBC and differential; Future  - C-reactive protein; Future  - Sedimentation rate, automated; Future  - Erythropoietin; Future  - Carboxyhemoglobin; Future  - LD,Blood; Future      HPI:  Patient is a pleasant 79-year-old female with complicated past medical history who presented for further evaluation and treatment of her microcytic anemia/iron deficiency  She had gastric sleeve surgery 2016 followed by laparoscopic paraesophageal hernia repair in 2018 which later resulted in GE junction leak/fistula requiring open repair and lead to sepsis/inpatient hospitalization for 5 months  She also was diagnosed with pseudotumor cerebri in 2015 had  shunt placed which had to be removed with her sepsis and later had VA shunt placed  She has  PTSD as a result of her above-mentioned events, kidney stones and migraines  She does have a neurologist and gastroenterologist who is monitoring her       Her laboratory studies from 08/22/2021 showed microcytic hypochromic anemia H&H 8 6/30, MCV 77, MCH 22 1 her platelet count and white cells or normal   BMP normal   She had significant iron deficiency iron saturation 5%, TIBC 414, serum iron 22 with ferritin 2  Her iron deficiency was corrected with IV iron Venofer x6 treatments November/December 2021  Interval history:  Patient presents today for a follow-up visit    She states that she continues to have significant fatigue and did not notice much difference after completing her IV iron   She has chronic headaches secondary to her pseudotumor cerebri which seem to be worse in the cold weather  She denies any bleeding from any site  She mentions that she has been having significant dysuria and burning with urination and has already contacted her urologist who ordered urine evaluation which she will be submitting today  She has been taking over-the-counter B12 supplement daily  She did have repeat upper endoscopy recently 02/18/2022 which did not show any significant findings she also has Bravo monitoring device for her significant reflux    Her most recent laboratory studies from 02/17/2022 showed normal white cells and platelets she is no longer anemic in fact she now has mild erythrocytosis RBC 5 23, H&H 15 5/47  8  CMP is normal   Inflammatory markers are not elevated  She has no obvious hint of monoclonal gammopathy or hemoptysis  Is no longer iron deficient iron saturation 23% with ferritin 50  Her folic acid is lower than average 7 3 as is her vitamin B12 373  ROS: Review of Systems   Constitutional: Positive for fatigue  Negative for activity change, appetite change, chills, fever and unexpected weight change  HENT: Positive for hearing loss  Negative for congestion, mouth sores, nosebleeds, sore throat and trouble swallowing  Eyes: Negative  Respiratory: Negative for cough, chest tightness and shortness of breath  Cardiovascular: Negative for chest pain, palpitations and leg swelling  Gastrointestinal: Positive for vomiting  Negative for abdominal distention, abdominal pain, blood in stool, constipation, diarrhea and nausea  Genitourinary: Positive for difficulty urinating and dysuria  Negative for hematuria  Musculoskeletal: Negative for arthralgias, back pain, gait problem, joint swelling and myalgias  Skin: Negative for color change, pallor and rash  Neurological: Positive for headaches  Negative for dizziness, weakness, light-headedness and numbness  Hematological: Negative for adenopathy  Does not bruise/bleed easily  Psychiatric/Behavioral: Negative for dysphoric mood and sleep disturbance  The patient is nervous/anxious  Past Medical History:   Diagnosis Date    Abdominal pain     Brain condition     Pseudotumor Cerebri     Chronic kidney disease     De Quervain's disease (tenosynovitis)     Esophageal perforation     Gastric leak     GERD (gastroesophageal reflux disease)     Headache     Idiopathic intracranial hypertension     Kidney stone     Migraine     No blood products     per pt: personal and Yarsanism beliefs  surgeon office aware 12/13/19    Papilledema, both eyes     PONV (postoperative nausea and vomiting)     Postgastrectomy malabsorption     Presence of lumboperitoneal shunt     Resolved: Sep 20, 2017    Rotator cuff tendinitis     Resolved: Aug 23, 2017    Visual field defect        Past Surgical History:   Procedure Laterality Date    BREAST BIOPSY Left 1993    benign    CSF SHUNT      LP shunt - 2015 -  shunt - 2017    ESOPHAGOGASTRODUODENOSCOPY N/A 2/23/2018    Procedure: ESOPHAGOGASTRODUODENOSCOPY (EGD) WITH ESOPHAGEAL STENT PLACEMENT;  Surgeon: Chirag Robledo MD;  [facility];  Service: Thoracic    ESOPHAGOGASTRODUODENOSCOPY N/A 2/23/2018    Procedure: ESOPHAGOGASTRODUODENOSCOPY (EGD) WITH REMOVAL ESOPHAGEAL STENT  AND REPLACEMENT WITH 23mm X155mm [address] [facility] [address];  Surgeon: Chirag Robledo MD;  [facility];  Service: Thoracic    ESOPHAGOGASTRODUODENOSCOPY N/A 3/28/2018    Procedure: ESOPHAGOGASTRODUODENOSCOPY (EGD) with PEJ placement ;  Surgeon: Lorraine Rabago MD;  [facility]; Service: Gastroenterology    ESOPHAGOGASTRODUODENOSCOPY N/A 4/5/2018    Procedure: ESOPHAGOGASTRODUODENOSCOPY (EGD) with botox injection and kaofed placement;  Surgeon: Seda Gonzalez DO;  [facility];   Service: Gastroenterology    ESOPHAGOGASTRODUODENOSCOPY N/A 4/10/2018 Procedure: ESOPHAGOGASTRODUODENOSCOPY (EGD) with Kaofed placement;  Surgeon: Ephraim Albarado MD;  [facility]; Service: Gastroenterology    ESOPHAGOSCOPY WITH STENT INSERTION N/A 1/24/2018    Procedure: INSERTION STENT ESOPHAGEAL;  Surgeon: Rodrigo Delatorre MD;  [facility]; Service: Gastroenterology    EYE SURGERY Bilateral 1980    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)     FL RETROGRADE PYELOGRAM  6/24/2020    FL RETROGRADE PYELOGRAM  8/21/2021    GASTRIC BYPASS  12/19/2016    Lap sleeve gastrectomy w/shunt length shortening procedure    HIATAL HERNIA REPAIR      HYSTERECTOMY  03/14/2013    IR LUMBAR PUNCTURE  8/29/2018    LAPAROTOMY N/A 1/25/2018    Procedure: Exploratory Laparotomy, wash out,placement of drains, placement of NG feeding tube ;   Surgeon: Katie Campos DO;  [facility];  Service: General    LUMBAR PERITONEAL SHUNT  11/19/2015    Laparoscopic assisted    ND CREATE SHUNT:VENTRIC-ATRIAL Right 12/18/2019    Procedure: IMAGE GUIDED INSERTION OF RIGHT CORONAL VENTRICULAR-ATRIAL SHUNT;  Surgeon: Roda Eisenmenger, MD;  [facility];  Service: Neurosurgery    ND CREATE SHUNT:VENTRIC-PERITONEAL Right 5/31/2017    Procedure: IMAGE GUIDED CORONAL PLACEMENT OF PROGRAMABLE VENTRICULAR-PERITONEAL SHUNT, REMOVAL OF LP SHUNT ;  Surgeon: Roda Eisenmenger, MD;  [facility];  Service: Neurosurgery    ND CYSTO/URETERO W/LITHOTRIPSY &INDWELL STENT INSRT Bilateral 6/24/2020    Procedure: CYSTOSCOPY URETEROSCOPY WITH LITHOTRIPSY HOLMIUM LASER, RETROGRADE PYELOGRAM AND exchange bilateral  STENTs URETERAL;  Surgeon: Juice Iniguez MD;  [facility];  Service: Urology    ND CYSTO/URETERO W/LITHOTRIPSY &INDWELL STENT INSRT Left 8/21/2021    Procedure: CYSTOSCOPY; LEFT URETEROSCOPY WITH RETROGRADE PYELOGRAM, REMOVAL OF STONE AND INSERTION LEFT URETERAL STENT;  Surgeon: Delio Giordano MD;  [facility];  Service: Urology    ND CYSTOURETHROSCOPY,URETER CATHETER N/A 2020    Procedure: Bilateral CYSTOSCOPY RETROGRADE PYELOGRAM WITH INSERTION STENT URETERAL;  Surgeon: Дмитрий Raymond MD;  Location: [facility] MA[facility] OR;  Service: Urology    MI EGD TRANSORAL BIOPSY SINGLE/MULTIPLE N/A 2018    Procedure: ESOPHAGOGASTRODUODENOSCOPY (EGD) with padlock clip placement;  Surgeon: hSona Dia MD;  [facility];   Service: Mary Dancer CSF SHUNT,W/O REPLACE Right 2018    Procedure: Removal of  shunt;  Surgeon: Brittaney Cruz MD;  [facility];  Service: Neurosurgery    MI REPLACEMENT/REVISION,CSF SHUNT Right 2018    Procedure: Externalization of right-sided SHUNT VENTRICULAR-PERITONEAL in anterior chest wall ribs two and three level  ;  Surgeon: Abby Todd MD;  [facility];  Service: Neurosurgery    WRIST SURGERY Right     x3 ,        Social History     Socioeconomic History    Marital status: Single     Spouse name: None    Number of children: 2    Years of education: High School or GED     Highest education level: None   Occupational History    Occupation: employed    Tobacco Use    Smoking status: Former Smoker     Packs/day: 0 50     Years: 20 00     Pack years: 10 00     Types: Cigarettes     Quit date: 2012     Years since quittin 5    Smokeless tobacco: Never Used   Vaping Use    Vaping Use: Every day    Substances: THC, CBD   Substance and Sexual Activity    Alcohol use: Never    Drug use: Yes     Frequency: 7 0 times per week     Types: Marijuana     Comment: medical marijuana, vaping & topical     Sexual activity: Yes   Other Topics Concern    None   Social History Narrative    ** Merged History Encounter **          Social Determinants of Health     Financial Resource Strain: Not on file   Food Insecurity: Not on file   Transportation Needs: Not on file   Physical Activity: Not on file   Stress: Not on file   Social Connections: Not on file Intimate Partner Violence: Not on file   Housing Stability: Not on file       Family History   Problem Relation Age of Onset    No Known Problems Mother     No Known Problems Father     Thyroid cancer Brother 36    Colon cancer Maternal Grandfather 44    No Known Problems Paternal Grandmother     Cancer Paternal Grandfather     Cancer Family         Gastric    Leukemia Family     Colon cancer Family     Pancreatic cancer Family     No Known Problems Sister     No Known Problems Daughter     No Known Problems Maternal Grandmother     No Known Problems Son     No Known Problems Maternal Aunt     No Known Problems Maternal Aunt     No Known Problems Paternal Aunt        Allergies   Allergen Reactions    Benadryl [Diphenhydramine] Anaphylaxis     Throat closing    Phenergan [Promethazine] Anaphylaxis         Current Outpatient Medications:     ascorbic acid (VITAMIN C) 250 MG CHEW, Chew 250 mg daily, Disp: , Rfl:     Biotin 1 MG CAPS, Take by mouth daily  , Disp: , Rfl:     calcium citrate-vitamin D (CITRACAL+D) 315-200 MG-UNIT per tablet, Take 1 tablet by mouth 2 (two) times a day, Disp: , Rfl:     ferrous gluconate 324 (37 5 Fe) mg, Take 324 mg by mouth 2 (two) times a day before meals, Disp: , Rfl:     Multiple Vitamin (MULTIVITAMIN) tablet, Take 1 tablet by mouth daily Wears a patch, Disp: , Rfl:     Multiple Vitamins-Minerals (Hair/Skin/Nails) CAPS, Take by mouth 2 (two) times a day, Disp: , Rfl:     NON FORMULARY, , Disp: , Rfl:     omeprazole (PriLOSEC) 40 MG capsule, Take 1 capsule (40 mg total) by mouth 2 (two) times a day, Disp: 60 capsule, Rfl: 2    vitamin B-12 (VITAMIN B-12) 500 mcg tablet, Take 500 mcg by mouth daily, Disp: , Rfl:     folic acid (FOLVITE) 1 mg tablet, Take 1 tablet (1 mg total) by mouth daily, Disp: 90 tablet, Rfl: 4      Physical Exam:  /88 (BP Location: Left arm, Patient Position: Sitting, Cuff Size: Adult)   Pulse 67   Temp (!) 97 °F (36 1 °C) (Tympanic)   Resp 18   Ht 5' 3" (1 6 m)   Wt 66 6 kg (146 lb 12 8 oz)   LMP  (LMP Unknown)   SpO2 99%   BMI 26 00 kg/m²     Physical Exam  Vitals reviewed  Constitutional:       General: She is not in acute distress  Appearance: She is well-developed  She is not diaphoretic  HENT:      Head: Normocephalic and atraumatic  Eyes:      General: No scleral icterus  Conjunctiva/sclera: Conjunctivae normal       Pupils: Pupils are equal, round, and reactive to light  Neck:      Thyroid: No thyromegaly  Cardiovascular:      Rate and Rhythm: Normal rate and regular rhythm  Heart sounds: Normal heart sounds  No murmur heard  Pulmonary:      Effort: Pulmonary effort is normal  No respiratory distress  Breath sounds: Normal breath sounds  Chest:   Breasts:      Right: No axillary adenopathy  Left: No axillary adenopathy  Abdominal:      General: There is no distension  Palpations: Abdomen is soft  There is no hepatomegaly or splenomegaly  Tenderness: There is no abdominal tenderness  Musculoskeletal:         General: No swelling  Normal range of motion  Cervical back: Normal range of motion and neck supple  Lymphadenopathy:      Cervical: No cervical adenopathy  Upper Body:      Right upper body: No axillary adenopathy  Left upper body: No axillary adenopathy  Skin:     General: Skin is warm and dry  Findings: No erythema or rash  Neurological:      General: No focal deficit present  Mental Status: She is alert and oriented to person, place, and time  Psychiatric:         Mood and Affect: Mood is anxious  Affect is blunt  Behavior: Behavior normal  Behavior is cooperative  Thought Content:  Thought content normal          Judgment: Judgment normal            Labs:  Lab Results   Component Value Date    WBC 6 55 02/17/2022    HGB 15 5 (H) 02/17/2022    HCT 47 8 (H) 02/17/2022    MCV 91 02/17/2022     02/17/2022 Lab Results   Component Value Date     03/22/2018    K 4 4 02/17/2022     02/17/2022    CO2 30 02/17/2022    ANIONGAP 8 5 03/22/2018    BUN 20 02/17/2022    CREATININE 0 70 02/17/2022    GLUCOSE 94 01/25/2018    CALCIUM 9 7 02/17/2022    CORRECTEDCA 9 2 08/21/2021    AST 14 02/17/2022    ALT 21 02/17/2022    ALKPHOS 59 02/17/2022    EGFR 103 02/17/2022       Patient voiced understanding and agreement in the above discussion  Aware to contact our office with questions/symptoms in the interim  This note has been generated by voice recognition software system  Therefore, there may be spelling, grammar, and or syntax errors  Please contact if questions arise

## 2022-03-11 NOTE — TELEPHONE ENCOUNTER
Patient may be passing a stone if still symptomatic  Last CT scan in August identified multiple bilateral nonobstructing stones  Will change CT to stat, please assist with rescheduling

## 2022-03-11 NOTE — TELEPHONE ENCOUNTER
Urine culture pending, however UA noted microhematuria and calcium crystals  Patient has prior history of kidney stones  Recommend she proceed with CT renal stone study  Please assist with scheduling

## 2022-03-11 NOTE — TELEPHONE ENCOUNTER
Called Shweta back and LM notifying her that the provider put in CT scan for STAT    Did see that she had it scheduled for the 23 th - she can call and get sooner now that its stat

## 2022-03-11 NOTE — TELEPHONE ENCOUNTER
Spoke with Andres Archer and told her of recommendation to have [facility] [facility] completed  She states she is still having sever back pain  and burning with urination   Encoured her to hydrate and take  Azo  she still  one bactrim pill left - she is requesting another refill of Bactrim  She was up all night in pain She will schedule Ct scan when she get off phone

## 2022-03-12 LAB — BACTERIA UR CULT: ABNORMAL

## 2022-03-15 ENCOUNTER — TELEPHONE (OUTPATIENT)
Dept: UROLOGY | Facility: HOSPITAL | Age: 47
End: 2022-03-15

## 2022-03-15 ENCOUNTER — TELEPHONE (OUTPATIENT)
Dept: PLASTIC SURGERY | Facility: CLINIC | Age: 47
End: 2022-03-15

## 2022-03-15 DIAGNOSIS — R30.0 DYSURIA: Primary | ICD-10-CM

## 2022-03-15 RX ORDER — SULFAMETHOXAZOLE AND TRIMETHOPRIM 800; 160 MG/1; MG/1
1 TABLET ORAL EVERY 12 HOURS SCHEDULED
Qty: 10 TABLET | Refills: 0 | Status: SHIPPED | OUTPATIENT
Start: 2022-03-15 | End: 2022-03-22

## 2022-03-15 NOTE — TELEPHONE ENCOUNTER
Called and spoke with patient  Advised that urine culture came back positive for infection and that Bactrim was sent over to her [employer]  She understands

## 2022-03-18 ENCOUNTER — TELEPHONE (OUTPATIENT)
Dept: RADIOLOGY | Facility: HOSPITAL | Age: 47
End: 2022-03-18

## 2022-03-18 ENCOUNTER — TELEPHONE (OUTPATIENT)
Dept: INTERNAL MEDICINE CLINIC | Facility: OTHER | Age: 47
End: 2022-03-18

## 2022-03-18 ENCOUNTER — OFFICE VISIT (OUTPATIENT)
Dept: PLASTIC SURGERY | Facility: CLINIC | Age: 47
End: 2022-03-18
Payer: MEDICARE

## 2022-03-18 VITALS
SYSTOLIC BLOOD PRESSURE: 122 MMHG | WEIGHT: 144.13 LBS | HEART RATE: 63 BPM | DIASTOLIC BLOOD PRESSURE: 65 MMHG | HEIGHT: 63 IN | BODY MASS INDEX: 25.54 KG/M2 | TEMPERATURE: 97.4 F

## 2022-03-18 DIAGNOSIS — N62 MACROMASTIA: Primary | ICD-10-CM

## 2022-03-18 PROCEDURE — 99213 OFFICE O/P EST LOW 20 MIN: CPT | Performed by: STUDENT IN AN ORGANIZED HEALTH CARE EDUCATION/TRAINING PROGRAM

## 2022-03-18 RX ORDER — ACETAMINOPHEN 500 MG
500 TABLET ORAL EVERY 6 HOURS PRN
Qty: 30 TABLET | Refills: 1 | Status: SHIPPED | OUTPATIENT
Start: 2022-03-18 | End: 2022-03-21 | Stop reason: ALTCHOICE

## 2022-03-18 RX ORDER — OXYCODONE HYDROCHLORIDE 5 MG/1
5 TABLET ORAL EVERY 4 HOURS PRN
Qty: 30 TABLET | Refills: 0 | Status: SHIPPED | OUTPATIENT
Start: 2022-03-18 | End: 2022-03-21 | Stop reason: ALTCHOICE

## 2022-03-18 RX ORDER — DOCUSATE SODIUM 100 MG/1
100 CAPSULE, LIQUID FILLED ORAL 2 TIMES DAILY
Qty: 20 CAPSULE | Refills: 1 | Status: SHIPPED | OUTPATIENT
Start: 2022-03-18 | End: 2022-03-21 | Stop reason: ALTCHOICE

## 2022-03-18 RX ORDER — ONDANSETRON 4 MG/1
4 TABLET, FILM COATED ORAL EVERY 8 HOURS PRN
Qty: 20 TABLET | Refills: 0 | Status: SHIPPED | OUTPATIENT
Start: 2022-03-18 | End: 2022-03-21 | Stop reason: ALTCHOICE

## 2022-03-18 NOTE — TELEPHONE ENCOUNTER
Called & left msg   Asked that patient register through E R for out patient testing scheduled for 3/19/2022  TR [id number] 3/18/2022

## 2022-03-18 NOTE — PROGRESS NOTES
PRS Note    Pre-op BBR    Discussed risks, benefits, procedure, complications, and postop care for BBR including but not limited to scarring, wound healing, abscesses, seromas, fat necrosis, changes in nipple sensation, changes in breast feeding ability, partial vs complete nipple necrosis, fat necrosis, and possible need for free nipple graft  I reiterated due to her history of gastric bypass, that she would have faster recurrent ptosis due to skin quality and laxity of tissue  I also discussed the possibility of free nipple grafts due to her measurements  Also discussed lack of upper pole fullness which will likely be ameloriated but will remain after surgery  Estimated amount of reduction per side is 300-350 g  Patient currently wears 38DDD and would like something C range (maybe full B cup) but proportional to her body type  She does not smoke  Normal nipple sensation    Due to laxity, I recommended galaform mesh, which she does not want to pursue due to prior surgical complications  Mammogram last month was normal     All questions answered, concerns addressed   Consents obtained   -Patient expressly stated she DOES NOT WANT ANY BLOOD TRANSFUSIONS    -Will send postop prescriptions to pharmacy today  -Will require 1 L of tumescent    Rai Eddy MD   [facility] [place] [facility]astic and Reconstructive Surgery   [address], [address]   [facility], [address]   Office: [phone]

## 2022-03-18 NOTE — TELEPHONE ENCOUNTER
Received pre-op clearance paperwork for patient to have bilateral breast reduction  LMOM for patient to call and schedule a pre-op appointment before her surgery on 3/25/22

## 2022-03-21 ENCOUNTER — CONSULT (OUTPATIENT)
Dept: INTERNAL MEDICINE CLINIC | Facility: OTHER | Age: 47
End: 2022-03-21
Payer: MEDICARE

## 2022-03-21 VITALS
HEIGHT: 63 IN | TEMPERATURE: 98.6 F | DIASTOLIC BLOOD PRESSURE: 78 MMHG | HEART RATE: 66 BPM | OXYGEN SATURATION: 97 % | WEIGHT: 143 LBS | BODY MASS INDEX: 25.34 KG/M2 | SYSTOLIC BLOOD PRESSURE: 108 MMHG

## 2022-03-21 DIAGNOSIS — Z98.84 BARIATRIC SURGERY STATUS: ICD-10-CM

## 2022-03-21 DIAGNOSIS — K91.2 POSTGASTRECTOMY MALABSORPTION: Primary | Chronic | ICD-10-CM

## 2022-03-21 DIAGNOSIS — D50.8 IRON DEFICIENCY ANEMIA SECONDARY TO INADEQUATE DIETARY IRON INTAKE: ICD-10-CM

## 2022-03-21 DIAGNOSIS — Z01.818 PREOPERATIVE CLEARANCE: ICD-10-CM

## 2022-03-21 DIAGNOSIS — Z90.3 POSTGASTRECTOMY MALABSORPTION: Primary | Chronic | ICD-10-CM

## 2022-03-21 PROBLEM — N30.01 ACUTE CYSTITIS WITH HEMATURIA: Status: RESOLVED | Noted: 2020-01-03 | Resolved: 2022-03-21

## 2022-03-21 PROCEDURE — 99214 OFFICE O/P EST MOD 30 MIN: CPT | Performed by: NURSE PRACTITIONER

## 2022-03-21 NOTE — ASSESSMENT & PLAN NOTE
Cleared medically for breast reduction surgery  EKG reviewed  Able to perform >4 METS without any cardiac symptoms

## 2022-03-21 NOTE — PROGRESS NOTES
Assessment/Plan:    Iron deficiency anemia secondary to inadequate dietary iron intake  Continue iron supplements as ordered      Bariatric surgery status  S/p sleeve  C/b bowel perforation      Postgastrectomy malabsorption  Continue vitamins as scheduled    Preoperative clearance  Cleared medically for breast reduction surgery  EKG reviewed  Able to perform >4 METS without any cardiac symptoms    Pseudotumor cerebri  S/p ventricular/atrial shunt  F/b NS  stable         Problem List Items Addressed This Visit        Digestive    Postgastrectomy malabsorption - Primary (Chronic)     Continue vitamins as scheduled            Other    Bariatric surgery status     S/p sleeve  C/b bowel perforation           Preoperative clearance     Cleared medically for breast reduction surgery  EKG reviewed  Able to perform >4 METS without any cardiac symptoms         Iron deficiency anemia secondary to inadequate dietary iron intake     Continue iron supplements as ordered                   Subjective:      Patient ID: Geena Hopper is a 51 y o  female  Raissa Gather is here today for preoperative clearance for breast reduction surgery  The patient has a history of gastric bypass surgery back in 2018, she did have severe complications including bowel perforation, sepsis, colostomy with reversal   Patient also has a history of a  shunt, that point time it was removed and now she has a ventricular atrial shunt  She is followed closely by Neurology who felt she was cleared for surgery  She denies any chest pain, palpitations or chest pressure  She does remain fairly active, she is able to climb a flight of steps without any difficulty, she is able to easily walk 2 blocks without any symptoms  I did review her EKG, it is unchanged from previous  She is cleared by Medicine for the aforementioned surgery  She did have some questions regarding drain placement postoperatively    I did feel that it was likely she would have some however did refer her to her surgeon  The following portions of the patient's history were reviewed and updated as appropriate: allergies, current medications, past family history, past medical history, past social history, past surgical history and problem list     Review of Systems   Musculoskeletal: Positive for back pain and neck pain  Chronic d/t large breasts         Objective:      /78 (BP Location: Left arm, Patient Position: Sitting, Cuff Size: Adult)   Pulse 66   Temp 98 6 °F (37 °C) (Temporal)   Ht 5' 3" (1 6 m)   Wt 64 9 kg (143 lb)   LMP  (LMP Unknown)   SpO2 97% Comment: ra  BMI 25 33 kg/m²          Physical Exam  Vitals and nursing note reviewed  Constitutional:       Appearance: Normal appearance  She is normal weight  HENT:      Head: Normocephalic  Nose: Nose normal       Mouth/Throat:      Mouth: Mucous membranes are dry  Eyes:      Extraocular Movements: Extraocular movements intact  Conjunctiva/sclera: Conjunctivae normal       Pupils: Pupils are equal, round, and reactive to light  Cardiovascular:      Rate and Rhythm: Normal rate and regular rhythm  Pulses: Normal pulses  Heart sounds: Normal heart sounds  Pulmonary:      Effort: Pulmonary effort is normal    Abdominal:      General: Abdomen is flat  Bowel sounds are normal       Palpations: Abdomen is soft  Comments: Large b/l pendulous breasts   Musculoskeletal:         General: Normal range of motion  Cervical back: Normal range of motion  Skin:     General: Skin is warm and dry  Neurological:      General: No focal deficit present  Mental Status: She is alert and oriented to person, place, and time  Mental status is at baseline     Psychiatric:         Mood and Affect: Mood normal

## 2022-03-22 NOTE — PRE-PROCEDURE INSTRUCTIONS
Pre-Surgery Instructions:   Medication Instructions    ascorbic acid (VITAMIN C) 250 MG CHEW Hold until after surgery    Biotin 1 MG CAPS Hold until after surgery    calcium citrate-vitamin D (CITRACAL+D) 315-200 MG-UNIT per tablet Hold until after surgery    ferrous gluconate 324 (37 5 Fe) mg Instructed to take per normal schedule except DOS    folic acid (FOLVITE) 1 mg tablet Instructed to take per normal schedule except DOS    Multiple Vitamin (MULTIVITAMIN) tablet Hold until after surgery    Multiple Vitamins-Minerals (Hair/Skin/Nails) CAPS Hold until after surgery    NON FORMULARY-medical marijuana Hold 24 hours prior to surgery    omeprazole (PriLOSEC) 40 MG capsule Instructed to take per normal schedule including DOS with sips water    vitamin B-12 (VITAMIN B-12) 500 mcg tablet Hold until after surgery     Have you had / have a sore throat? no  Have you had / have a cough less than 1 week? no  Have you had / have a fever greater than 100 0 - 100  4? no  Are you experiencing any shortness of breath? No  Not vaccinated  Reviewed with patient, in detail, instructions from "My Surgical Experience"  Instructed to avoid all ASA and OTC Vit/Supp 1 week prior to surgery and to avoid NSAIDs 7 days prior to surgery per anesthesia instructions  Tylenol ok to take prn  Advised patient that [facility] will call with surgery arrival time and hospital directions the business day prior to surgery  Advised patient nothing eat or drink after midnight  Patient verbalized understanding and knows to call surgeon's office with any additional questions prior to surgery  Patient verbalized understanding of current visitor restrictions and advised that he/she can confirm these at time of arrival call with [facility]

## 2022-03-23 ENCOUNTER — HOSPITAL ENCOUNTER (OUTPATIENT)
Dept: CT IMAGING | Facility: HOSPITAL | Age: 47
Discharge: HOME | End: 2022-03-23
Payer: MEDICARE

## 2022-03-23 DIAGNOSIS — R30.0 DYSURIA: ICD-10-CM

## 2022-03-23 DIAGNOSIS — K21.9 GASTROESOPHAGEAL REFLUX DISEASE, UNSPECIFIED WHETHER ESOPHAGITIS PRESENT: Primary | ICD-10-CM

## 2022-03-23 PROCEDURE — 91035 G-ESOPH REFLX TST W/ELECTROD: CPT | Performed by: INTERNAL MEDICINE

## 2022-03-23 PROCEDURE — 74176 CT ABD & PELVIS W/O CONTRAST: CPT

## 2022-03-23 PROCEDURE — G1004 CDSM NDSC: HCPCS

## 2022-03-23 RX ORDER — DEXLANSOPRAZOLE 60 MG/1
60 CAPSULE, DELAYED RELEASE ORAL DAILY
Qty: 90 CAPSULE | Refills: 3 | Status: SHIPPED | OUTPATIENT
Start: 2022-03-23 | End: 2022-03-25

## 2022-03-24 ENCOUNTER — TELEPHONE (OUTPATIENT)
Dept: GASTROENTEROLOGY | Facility: MEDICAL CENTER | Age: 47
End: 2022-03-24

## 2022-03-24 NOTE — TELEPHONE ENCOUNTER
Started a prior authorization for Dexilant 60MG from [facility]   Key#[id number]  Waiting for determination

## 2022-03-24 NOTE — TELEPHONE ENCOUNTER
[place] [facility] DExilant 60MG was DENIED  Was not medically necessary must try preferred drugs   Esomeprazole Magnesium DR capsule   Lansoprazole Dr capsule   Rabeprazole

## 2022-03-25 ENCOUNTER — DOCUMENTATION (OUTPATIENT)
Dept: GASTROENTEROLOGY | Facility: MEDICAL CENTER | Age: 47
End: 2022-03-25

## 2022-03-25 ENCOUNTER — HOSPITAL ENCOUNTER (OUTPATIENT)
Facility: HOSPITAL | Age: 47
Setting detail: OUTPATIENT SURGERY
Discharge: HOME | End: 2022-03-25
Attending: STUDENT IN AN ORGANIZED HEALTH CARE EDUCATION/TRAINING PROGRAM | Admitting: STUDENT IN AN ORGANIZED HEALTH CARE EDUCATION/TRAINING PROGRAM
Payer: MEDICARE

## 2022-03-25 ENCOUNTER — ANESTHESIA EVENT (OUTPATIENT)
Dept: PERIOP | Facility: HOSPITAL | Age: 47
End: 2022-03-25
Payer: MEDICARE

## 2022-03-25 ENCOUNTER — ANESTHESIA (OUTPATIENT)
Dept: PERIOP | Facility: HOSPITAL | Age: 47
End: 2022-03-25
Payer: MEDICARE

## 2022-03-25 VITALS
BODY MASS INDEX: 25.34 KG/M2 | DIASTOLIC BLOOD PRESSURE: 60 MMHG | RESPIRATION RATE: 16 BRPM | OXYGEN SATURATION: 95 % | HEIGHT: 63 IN | HEART RATE: 76 BPM | SYSTOLIC BLOOD PRESSURE: 100 MMHG | TEMPERATURE: 98 F | WEIGHT: 143 LBS

## 2022-03-25 DIAGNOSIS — K21.9 GASTROESOPHAGEAL REFLUX DISEASE, UNSPECIFIED WHETHER ESOPHAGITIS PRESENT: Primary | ICD-10-CM

## 2022-03-25 DIAGNOSIS — N62 MACROMASTIA: Primary | ICD-10-CM

## 2022-03-25 DIAGNOSIS — M54.9 DORSALGIA, UNSPECIFIED: ICD-10-CM

## 2022-03-25 DIAGNOSIS — N62 HYPERTROPHY OF BREAST: ICD-10-CM

## 2022-03-25 PROCEDURE — 19318 BREAST REDUCTION: CPT | Performed by: STUDENT IN AN ORGANIZED HEALTH CARE EDUCATION/TRAINING PROGRAM

## 2022-03-25 PROCEDURE — NC001 PR NO CHARGE: Performed by: STUDENT IN AN ORGANIZED HEALTH CARE EDUCATION/TRAINING PROGRAM

## 2022-03-25 PROCEDURE — 88305 TISSUE EXAM BY PATHOLOGIST: CPT | Performed by: PATHOLOGY

## 2022-03-25 PROCEDURE — 99024 POSTOP FOLLOW-UP VISIT: CPT | Performed by: STUDENT IN AN ORGANIZED HEALTH CARE EDUCATION/TRAINING PROGRAM

## 2022-03-25 PROCEDURE — 19318 BREAST REDUCTION: CPT | Performed by: PHYSICIAN ASSISTANT

## 2022-03-25 RX ORDER — NEOSTIGMINE METHYLSULFATE 1 MG/ML
INJECTION INTRAVENOUS AS NEEDED
Status: DISCONTINUED | OUTPATIENT
Start: 2022-03-25 | End: 2022-03-25

## 2022-03-25 RX ORDER — CEFAZOLIN SODIUM 1 G/50ML
1000 SOLUTION INTRAVENOUS ONCE
Status: DISCONTINUED | OUTPATIENT
Start: 2022-03-25 | End: 2022-03-25 | Stop reason: HOSPADM

## 2022-03-25 RX ORDER — MIDAZOLAM HYDROCHLORIDE 2 MG/2ML
INJECTION, SOLUTION INTRAMUSCULAR; INTRAVENOUS AS NEEDED
Status: DISCONTINUED | OUTPATIENT
Start: 2022-03-25 | End: 2022-03-25

## 2022-03-25 RX ORDER — ACETAMINOPHEN 500 MG
500 TABLET ORAL EVERY 6 HOURS PRN
Qty: 30 TABLET | Refills: 2 | Status: SHIPPED | OUTPATIENT
Start: 2022-03-25

## 2022-03-25 RX ORDER — CEFAZOLIN SODIUM 2 G/50ML
SOLUTION INTRAVENOUS AS NEEDED
Status: DISCONTINUED | OUTPATIENT
Start: 2022-03-25 | End: 2022-03-25

## 2022-03-25 RX ORDER — ONDANSETRON 4 MG/1
4 TABLET, FILM COATED ORAL EVERY 8 HOURS PRN
Qty: 20 TABLET | Refills: 0 | Status: SHIPPED | OUTPATIENT
Start: 2022-03-25

## 2022-03-25 RX ORDER — FENTANYL CITRATE 50 UG/ML
INJECTION, SOLUTION INTRAMUSCULAR; INTRAVENOUS AS NEEDED
Status: DISCONTINUED | OUTPATIENT
Start: 2022-03-25 | End: 2022-03-25

## 2022-03-25 RX ORDER — ESOMEPRAZOLE MAGNESIUM 40 MG/1
40 CAPSULE, DELAYED RELEASE ORAL
Qty: 60 CAPSULE | Refills: 0 | Status: SHIPPED | OUTPATIENT
Start: 2022-03-25 | End: 2022-05-04

## 2022-03-25 RX ORDER — SCOLOPAMINE TRANSDERMAL SYSTEM 1 MG/1
1 PATCH, EXTENDED RELEASE TRANSDERMAL ONCE
Status: COMPLETED | OUTPATIENT
Start: 2022-03-25 | End: 2022-03-25

## 2022-03-25 RX ORDER — PROPOFOL 10 MG/ML
INJECTION, EMULSION INTRAVENOUS CONTINUOUS PRN
Status: DISCONTINUED | OUTPATIENT
Start: 2022-03-25 | End: 2022-03-25

## 2022-03-25 RX ORDER — GABAPENTIN 300 MG/1
300 CAPSULE ORAL ONCE
Status: COMPLETED | OUTPATIENT
Start: 2022-03-25 | End: 2022-03-25

## 2022-03-25 RX ORDER — PROPOFOL 10 MG/ML
INJECTION, EMULSION INTRAVENOUS AS NEEDED
Status: DISCONTINUED | OUTPATIENT
Start: 2022-03-25 | End: 2022-03-25

## 2022-03-25 RX ORDER — CEFAZOLIN SODIUM 1 G/50ML
SOLUTION INTRAVENOUS AS NEEDED
Status: DISCONTINUED | OUTPATIENT
Start: 2022-03-25 | End: 2022-03-25

## 2022-03-25 RX ORDER — OXYCODONE HYDROCHLORIDE 5 MG/1
5 TABLET ORAL EVERY 4 HOURS PRN
Qty: 30 TABLET | Refills: 0 | Status: SHIPPED | OUTPATIENT
Start: 2022-03-25 | End: 2022-04-04

## 2022-03-25 RX ORDER — ALBUMIN, HUMAN INJ 5% 5 %
SOLUTION INTRAVENOUS CONTINUOUS PRN
Status: DISCONTINUED | OUTPATIENT
Start: 2022-03-25 | End: 2022-03-25

## 2022-03-25 RX ORDER — SODIUM CHLORIDE 9 MG/ML
INJECTION, SOLUTION INTRAVENOUS CONTINUOUS PRN
Status: DISCONTINUED | OUTPATIENT
Start: 2022-03-25 | End: 2022-03-25

## 2022-03-25 RX ORDER — ONDANSETRON 2 MG/ML
INJECTION INTRAMUSCULAR; INTRAVENOUS AS NEEDED
Status: DISCONTINUED | OUTPATIENT
Start: 2022-03-25 | End: 2022-03-25

## 2022-03-25 RX ORDER — LIDOCAINE HYDROCHLORIDE 10 MG/ML
INJECTION, SOLUTION EPIDURAL; INFILTRATION; INTRACAUDAL; PERINEURAL AS NEEDED
Status: DISCONTINUED | OUTPATIENT
Start: 2022-03-25 | End: 2022-03-25

## 2022-03-25 RX ORDER — DEXAMETHASONE SODIUM PHOSPHATE 10 MG/ML
INJECTION, SOLUTION INTRAMUSCULAR; INTRAVENOUS AS NEEDED
Status: DISCONTINUED | OUTPATIENT
Start: 2022-03-25 | End: 2022-03-25

## 2022-03-25 RX ORDER — ONDANSETRON 2 MG/ML
4 INJECTION INTRAMUSCULAR; INTRAVENOUS ONCE AS NEEDED
Status: DISCONTINUED | OUTPATIENT
Start: 2022-03-25 | End: 2022-03-25 | Stop reason: HOSPADM

## 2022-03-25 RX ORDER — ACETAMINOPHEN 325 MG/1
975 TABLET ORAL ONCE
Status: COMPLETED | OUTPATIENT
Start: 2022-03-25 | End: 2022-03-25

## 2022-03-25 RX ORDER — HYDROMORPHONE HCL/PF 1 MG/ML
SYRINGE (ML) INJECTION AS NEEDED
Status: DISCONTINUED | OUTPATIENT
Start: 2022-03-25 | End: 2022-03-25

## 2022-03-25 RX ORDER — HYDROMORPHONE HCL IN WATER/PF 6 MG/30 ML
0.2 PATIENT CONTROLLED ANALGESIA SYRINGE INTRAVENOUS
Status: DISCONTINUED | OUTPATIENT
Start: 2022-03-25 | End: 2022-03-25 | Stop reason: HOSPADM

## 2022-03-25 RX ORDER — SODIUM CHLORIDE, SODIUM LACTATE, POTASSIUM CHLORIDE, CALCIUM CHLORIDE 600; 310; 30; 20 MG/100ML; MG/100ML; MG/100ML; MG/100ML
50 INJECTION, SOLUTION INTRAVENOUS CONTINUOUS
Status: DISCONTINUED | OUTPATIENT
Start: 2022-03-25 | End: 2022-03-25 | Stop reason: HOSPADM

## 2022-03-25 RX ORDER — FENTANYL CITRATE/PF 50 MCG/ML
50 SYRINGE (ML) INJECTION
Status: COMPLETED | OUTPATIENT
Start: 2022-03-25 | End: 2022-03-25

## 2022-03-25 RX ORDER — ROCURONIUM BROMIDE 10 MG/ML
INJECTION, SOLUTION INTRAVENOUS AS NEEDED
Status: DISCONTINUED | OUTPATIENT
Start: 2022-03-25 | End: 2022-03-25

## 2022-03-25 RX ORDER — DOCUSATE SODIUM 100 MG/1
100 CAPSULE, LIQUID FILLED ORAL 2 TIMES DAILY
Qty: 20 CAPSULE | Refills: 2 | Status: SHIPPED | OUTPATIENT
Start: 2022-03-25

## 2022-03-25 RX ORDER — METOCLOPRAMIDE HYDROCHLORIDE 5 MG/ML
INJECTION INTRAMUSCULAR; INTRAVENOUS AS NEEDED
Status: DISCONTINUED | OUTPATIENT
Start: 2022-03-25 | End: 2022-03-25

## 2022-03-25 RX ADMIN — FENTANYL CITRATE 100 MCG: 50 INJECTION INTRAMUSCULAR; INTRAVENOUS at 11:19

## 2022-03-25 RX ADMIN — ROCURONIUM BROMIDE 10 MG: 50 INJECTION INTRAVENOUS at 13:31

## 2022-03-25 RX ADMIN — PROPOFOL 100 MG: 10 INJECTION, EMULSION INTRAVENOUS at 12:32

## 2022-03-25 RX ADMIN — ROCURONIUM BROMIDE 10 MG: 50 INJECTION INTRAVENOUS at 13:03

## 2022-03-25 RX ADMIN — Medication 50 MCG: at 16:10

## 2022-03-25 RX ADMIN — SODIUM CHLORIDE: 0.9 INJECTION, SOLUTION INTRAVENOUS at 13:36

## 2022-03-25 RX ADMIN — Medication 50 MCG: at 16:37

## 2022-03-25 RX ADMIN — SCOPALAMINE 1 PATCH: 1 PATCH, EXTENDED RELEASE TRANSDERMAL at 10:25

## 2022-03-25 RX ADMIN — NEOSTIGMINE METHYLSULFATE 30 MG: 1 INJECTION INTRAVENOUS at 12:32

## 2022-03-25 RX ADMIN — MIDAZOLAM 2 MG: 1 INJECTION INTRAMUSCULAR; INTRAVENOUS at 11:13

## 2022-03-25 RX ADMIN — HYDROMORPHONE HYDROCHLORIDE 0.25 MG: 1 INJECTION, SOLUTION INTRAMUSCULAR; INTRAVENOUS; SUBCUTANEOUS at 15:24

## 2022-03-25 RX ADMIN — ROCURONIUM BROMIDE 10 MG: 50 INJECTION INTRAVENOUS at 14:57

## 2022-03-25 RX ADMIN — SODIUM CHLORIDE: 0.9 INJECTION, SOLUTION INTRAVENOUS at 11:11

## 2022-03-25 RX ADMIN — ALBUMIN (HUMAN): 12.5 INJECTION, SOLUTION INTRAVENOUS at 14:13

## 2022-03-25 RX ADMIN — PROPOFOL 120 MCG/KG/MIN: 10 INJECTION, EMULSION INTRAVENOUS at 11:20

## 2022-03-25 RX ADMIN — PROPOFOL 200 MG: 10 INJECTION, EMULSION INTRAVENOUS at 11:19

## 2022-03-25 RX ADMIN — GABAPENTIN 300 MG: 300 CAPSULE ORAL at 09:36

## 2022-03-25 RX ADMIN — HYDROMORPHONE HYDROCHLORIDE 0.5 MG: 1 INJECTION, SOLUTION INTRAMUSCULAR; INTRAVENOUS; SUBCUTANEOUS at 11:50

## 2022-03-25 RX ADMIN — ROCURONIUM BROMIDE 20 MG: 50 INJECTION INTRAVENOUS at 13:56

## 2022-03-25 RX ADMIN — SODIUM CHLORIDE, SODIUM LACTATE, POTASSIUM CHLORIDE, AND CALCIUM CHLORIDE: .6; .31; .03; .02 INJECTION, SOLUTION INTRAVENOUS at 13:19

## 2022-03-25 RX ADMIN — ROCURONIUM BROMIDE 20 MG: 50 INJECTION INTRAVENOUS at 11:46

## 2022-03-25 RX ADMIN — METOCLOPRAMIDE HYDROCHLORIDE 5 MG: 5 INJECTION INTRAMUSCULAR; INTRAVENOUS at 11:19

## 2022-03-25 RX ADMIN — ACETAMINOPHEN 975 MG: 325 TABLET ORAL at 09:35

## 2022-03-25 RX ADMIN — CEFAZOLIN SODIUM 1000 MG: 1 SOLUTION INTRAVENOUS at 15:13

## 2022-03-25 RX ADMIN — DEXAMETHASONE SODIUM PHOSPHATE 10 MG: 10 INJECTION, SOLUTION INTRAMUSCULAR; INTRAVENOUS at 11:19

## 2022-03-25 RX ADMIN — ROCURONIUM BROMIDE 50 MG: 50 INJECTION INTRAVENOUS at 11:19

## 2022-03-25 RX ADMIN — ROCURONIUM BROMIDE 20 MG: 50 INJECTION INTRAVENOUS at 14:30

## 2022-03-25 RX ADMIN — ONDANSETRON 4 MG: 2 INJECTION INTRAMUSCULAR; INTRAVENOUS at 14:45

## 2022-03-25 RX ADMIN — CEFAZOLIN SODIUM 1000 MG: 1 SOLUTION INTRAVENOUS at 11:20

## 2022-03-25 RX ADMIN — ROCURONIUM BROMIDE 10 MG: 50 INJECTION INTRAVENOUS at 13:14

## 2022-03-25 RX ADMIN — SODIUM CHLORIDE, SODIUM LACTATE, POTASSIUM CHLORIDE, AND CALCIUM CHLORIDE 50 ML/HR: .6; .31; .03; .02 INJECTION, SOLUTION INTRAVENOUS at 10:03

## 2022-03-25 RX ADMIN — PROPOFOL 100 MG: 10 INJECTION, EMULSION INTRAVENOUS at 12:26

## 2022-03-25 RX ADMIN — SUGAMMADEX 200 MG: 100 INJECTION, SOLUTION INTRAVENOUS at 15:29

## 2022-03-25 NOTE — PROGRESS NOTES
Attempted to get approval for Dexilant  The following was the communcation from the insurance provider :     Per Katheryn Martin prior auth for DExilant 60MG was DENIED  Was not medically necessary must try preferred drugs   Esomeprazole Magnesium DR capsule   Lansoprazole Dr capsule   Rabeprazole     I have discussed with the patient  She has not tried Nexium, Prevacid or AcipHex at this time  I will send for Nexium to see if that may help with her symptoms  If not then we will try either lansoprazole or AcipHex  If 1 of those does not work then I will retry the approval for [address] [place] [address]

## 2022-03-25 NOTE — DISCHARGE SUMMARY
Discharge Summary - Plastic Surgery   Bety Pino 51 y o  female MRN: [id number]  Unit/Bed#: OR POOL Encounter: [id number]    Admission Date:  3/25/22    Discharge Date: 3/25/22    Admitting Diagnosis: Hypertrophy of breast [N62]  Dorsalgia, unspecified [M54 9]    Discharge Diagnosis: same    Medical Problems             Resolved Problems  Date Reviewed: 3/25/2022    None                Attending: Sukhwinder Melendez Physician(s): none    Procedures Performed: Bilateral breast reduction    Pathology: sent    Hospital Course: Patient underwent BBR without complications and was discharged with RTC in 7-10 days  Condition at Discharge: good     Discharge instructions/Information to patient and family:   See after visit summary for information provided to patient and family  Discharge Instructions    -Take tylenol 500 mg as scheduled for pain  For severe breakthrough pain, take oxycodone 5 mg as scheduled   -Do not drive or operate heavy machinery when taking narcotic pain medicine as it can cause drowsiness   -No showering for 48 hours  After 48 hours, can remove all dressings on breast (except of surgical tape along incisions) and shower  No baths, hottubs, pools, or soaking incision   -After shower, pat incisions dry  Dress incisions with gauze  Incision may ooze for first few days, this is normal  Dressing may need to be changed more frequently if this occurs  -Supportive bra at all times, either surgical bra or sports bra  No underwire   -No strenuous activity, no heavy lifting, (nothing over 5 lbs), no reaching above shoulder or head as this can pull on incisions  -Breasts will be swollen and bruised and incisions may ooze for first few days   -Resume all home medications  -Resume regular diet  -Call [facility] [place] [facility] to make follow-up appointment with physician assistant in 7-10 days      Fuad Zacarias MD   [facility] Plastic and Reconstructive Surgery   [address] [facility] [address] [place], [address]   Office: [phone]      Provisions for Follow-Up Care:  See after visit summary for information related to follow-up care and any pertinent home health orders  Disposition: Home          Planned Readmission: No    Discharge Statement   I spent 10 minutes discharging the patient  This time was spent on the day of discharge  I had direct contact with the patient on the day of discharge  Additional documentation is required if more than 30 minutes were spent on discharge  Discharge Medications:  See after visit summary for reconciled discharge medications provided to patient and family

## 2022-03-25 NOTE — ANESTHESIA POSTPROCEDURE EVALUATION
Post-Op Assessment Note    CV Status:  Stable    Pain management: adequate     Mental Status:  Alert and awake   Hydration Status:  Stable   PONV Controlled:  None   Airway Patency:  Patent and adequate      Post Op Vitals Reviewed: Yes      Staff: Anesthesiologist, CRNA         No complications documented      BP (P) 99/52 (03/25/22 1556)    Temp (P) 98 2 °F (36 8 °C) (03/25/22 1556)    Pulse 87   Resp (P) 20 (03/25/22 1556)    SpO2 96% on RA

## 2022-03-25 NOTE — TELEPHONE ENCOUNTER
Called and LM per communication consent that her CT scan showed bilateral kidney stones  Advised that there was no swelling or inflammation of the kidneys  Encouraged her to increase her fluid intake and take OTC Tylenol/Motrin for pain and discomfort

## 2022-03-25 NOTE — H&P
Plastic Surgery Attending    H&P reviewed, no new changes  Ephraim Marley MD   [facility] Plastic and Reconstructive Surgery   [address]    Office: [phone]    Plastic Surgery Consult     Reason for visit: heavy breasts     HPI:  Patient is a 50 y/o female who presents with symptomatic macromastia  She has significant medical history of gastric sleeve in 2016 with over 110 lb weight loss  Her weight has since been stable over the last year      Of note, she subsequently suffered hiatal hernia, underwent surgery which was complicated by bowel perforation and exlap and was treated for 5 months in the hospital due to sepsis  She also has pseudotumor cerebri with ventriculoatrial shunt      She complains of large, heavy, pendulous breasts that causes her chest, upper back, neck, and shoulder discomfort  She also complains of rashes underneath her breast during humid weather and has difficulty exercising  She has tried supportive bra, ibuprofen with minimal symptomatic improvement  She denies lumps or discharge on self-exams      She currently wears 38DDD and would like to be C cup ideally    Her last mammogram was this month and was normal      ROS: 12 pt ROS negative, except as otherwise noted in HPI      PMH: massive weight loss, sepsis, hiatal hernia, pseudotumor cerebri  FamHx: non-contrib   SurgHx: gastric sleeve (2016), hiatal hernia surgery, exlap, VA shunt  SocHx: no tobacco, ETOH  Meds: no blood thinners, steroids  Allergies: phenergan, benedryl         PE:  Vitals:     03/04/22 1124   BP: 128/79   Pulse: 61   Resp: 16   Temp: 97 5 °F (36 4 °C)         General: NC/AT, breathing comfortably on RA  Neuro: CN II-XII grossly intact, symmetric reflexes  HEENT: PERRLA, EOMI, external ears normal, no lesions or deformities, neck supple, trachea midline  Respiratory: CTAB, normal respiratory effort  Cardio: RRR, normal S1, S2, no murmur, rubs, gallops  GI: soft, non-tender, non-distended  MSK: normal alignment, mobility, gait  Skin: no rashes, lesions, subcutaneous nodules     BMI 24     Breast Exam:  Bilateral moderately large, ptotic breasts with severe Grade III ptosis  R slightly larger than L  Enlarged areolas, normal nipple sensation  Mild shoulder grooving bilaterally  No current intertrigo, moist areas underneath breast  No masses, skin dimpling, or discharge     Measurements:                          R                      L  SN-N               33 cm              32 cm  N-IMF              19 cm              19 cm       Mammogram: normal     A/P: Patient is a 50 y/o female who presents for symptomatic macromastia s/p massive weight loss  -Patient would benefit from bilateral reduction mammoplasty  Technique: wise-pattern with inferior pedicle with estimated resection weight of 300-350 g per side  Any greater resection would leave her flat chested   -Discussed with patient the risks, benefits, procedure, and complications  Discussed changes/loss/hypersentivity in nipple sensation, diminished breast feeding ability and increase in size of breasts should she become pregnant  Discussed risk of partial vs complete nipple loss due to vascular issues  Discussed that breast reduction will help but may not completely resolve her back, chest, upper neck, and shoulder pain  Due to patient's morbid obesity, I discussed the increased risk of surgical complications including infection, seroma, abscess, wound dehisence  Due to her large pendulous breasts, the possibility of free nipple graft was also discussed    -Also discussed poor tissue quality and increased laxity due to massive weight loss  Discussed recurrent ptosis and possible use of mesh, for which, patient declined mesh    -Patient's questions answered, concerns addressed   -Photos taken, will submit to insurance once completion     -Of note, will need medical clearance, patient has pseudotumor cerebri with VA shunt      Stuart Espitia MD   [facility] Plastic and Reconstructive Surgery   [address]   [facility] [place] [address] [facility]   Office: [phone]

## 2022-03-25 NOTE — DISCHARGE INSTRUCTIONS
Discharge Instructions    -Take tylenol 500 mg as scheduled for pain  For severe breakthrough pain, take oxycodone 5 mg as scheduled   -Do not drive or operate heavy machinery when taking narcotic pain medicine as it can cause drowsiness   -No showering for 48 hours  After 48 hours, can remove all dressings on breast (except of surgical tape along incisions) and shower  No baths, hottubs, pools, or soaking incision   -After shower, pat incisions dry  Dress incisions with gauze  Incision may ooze for first few days, this is normal  Dressing may need to be changed more frequently if this occurs  -Supportive bra at all times, either surgical bra or sports bra  No underwire   -No strenuous activity, no heavy lifting, (nothing over 5 lbs), no reaching above shoulder or head as this can pull on incisions  -Breasts will be swollen and bruised and incisions may ooze for first few days   -Resume all home medications  -Resume regular diet  -Call [facility] to make follow-up appointment with physician assistant in 7-10 days      Inocencia Motta MD   [address] [place] [address] Plastic and Reconstructive Surgery   [address]   [place] [address]   Office: [phone]

## 2022-03-25 NOTE — ANESTHESIA PREPROCEDURE EVALUATION
Procedure:  BILATERAL BREAST REDUCTION (Bilateral Breast)    Relevant Problems   CARDIO   (+) Hypercholesteremia   (+) Migraine with aura and without status migrainosus, not intractable   (+) Murmur, cardiac      GI/HEPATIC   (+) Bariatric surgery status   (+) Gastroesophageal reflux disease      /RENAL   (+) Hydroureteronephrosis   (+) Renal calculi      HEMATOLOGY   (+) Anemia   (+) Iron deficiency anemia secondary to inadequate dietary iron intake      NEURO/PSYCH   (+) Chronic tension-type headache, intractable   (+) History of idiopathic intracranial hypertension   (+) Migraine with aura and without status migrainosus, not intractable   (+) PTSD (post-traumatic stress disorder)          Physical Exam    Airway    Mallampati score: I  TM Distance: >3 FB  Neck ROM: full     Dental       Cardiovascular      Pulmonary      Other Findings     LEFT VENTRICLE: Size was normal  Systolic function was normal  Ejection fraction was estimated to be 65 %  There were no regional wall motion abnormalities  Wall thickness was normal  DOPPLER: The ratio of early ventricular filling to  atrial contraction velocities was within the normal range  Left ventricular diastolic function parameters were normal      RIGHT VENTRICLE: The size was normal  Systolic function was normal      LEFT ATRIUM: Size was normal      RIGHT ATRIUM: Size was normal      MITRAL VALVE: Valve structure was normal  There was normal leaflet separation  DOPPLER: There was no evidence for stenosis  There was no significant regurgitation      AORTIC VALVE: The valve was trileaflet  Leaflets exhibited mildly increased thickness, mild calcification, and normal cuspal separation  DOPPLER: There was no evidence for stenosis  There was no regurgitation      TRICUSPID VALVE: The valve structure was normal  There was normal leaflet separation  DOPPLER: There was no evidence for stenosis   There was no significant regurgitation      PULMONIC VALVE: Leaflets exhibited normal thickness, no calcification, and normal cuspal separation  DOPPLER: The transpulmonic velocity was within the normal range  There was trace regurgitation      PERICARDIUM: There was no pericardial effusion  The pericardium was normal in appearance      AORTA: The root exhibited normal size          Anesthesia Plan  ASA Score- 3     Anesthesia Type- general with ASA Monitors  Additional Monitors:   Airway Plan: ETT  Plan Factors-    Chart reviewed  Imaging results reviewed  Existing labs reviewed  Patient summary reviewed  Patient is not a current smoker  Patient did not smoke on day of surgery  Induction- intravenous  Postoperative Plan- Plan for postoperative opioid use  Planned trial extubation    Informed Consent- Anesthetic plan and risks discussed with patient  I personally reviewed this patient with the CRNA  Discussed and agreed on the Anesthesia Plan with the CRNA  Dara Soulier

## 2022-03-27 NOTE — ADDENDUM NOTE
Addended [facility] on: 3/27/2022 06:52 PM     Modules accepted: Level of Service

## 2022-04-01 ENCOUNTER — OFFICE VISIT (OUTPATIENT)
Dept: PLASTIC SURGERY | Facility: CLINIC | Age: 47
End: 2022-04-01

## 2022-04-01 DIAGNOSIS — Z98.890 S/P BILATERAL BREAST REDUCTION: ICD-10-CM

## 2022-04-01 DIAGNOSIS — N62 MACROMASTIA: Primary | ICD-10-CM

## 2022-04-01 PROCEDURE — 99024 POSTOP FOLLOW-UP VISIT: CPT | Performed by: PHYSICIAN ASSISTANT

## 2022-04-01 NOTE — PROGRESS NOTES
POST-OP EVALUATION  4/1/2022    Melchor Ritchie is a 51 y o  female is here today for routine post-operative follow up   03/25/22 1110         Procedure: BILATERAL BREAST REDUCTION (Bilateral Breast)     Pt feels pain, mostly in left breast   She is constipated and nauseated  She stopped her narcotic and is taking a stool softener  Pt is getting accustomed to the smaller size of her breasts  Past Medical History:   Diagnosis Date    Abdominal pain     Acute cystitis with hematuria 1/3/2020    Brain condition     Pseudotumor Cerebri     Chronic kidney disease     De Quervain's disease (tenosynovitis)     Esophageal perforation     Gastric leak     GERD (gastroesophageal reflux disease)     Headache     Idiopathic intracranial hypertension     Kidney stone     Migraine     No blood products     per pt: personal and [employer] beliefs  surgeon office aware 12/13/19    Papilledema, both eyes     PONV (postoperative nausea and vomiting)     Postgastrectomy malabsorption     Presence of lumboperitoneal shunt     Resolved: Sep 20, 2017    Rotator cuff tendinitis     Resolved: Aug 23, 2017    Visual field defect      Past Surgical History:   Procedure Laterality Date    BREAST BIOPSY Left 1993    benign    CSF SHUNT      LP shunt - 2015 -  shunt - 2017    ESOPHAGOGASTRODUODENOSCOPY N/A 2/23/2018    Procedure: ESOPHAGOGASTRODUODENOSCOPY (EGD) WITH ESOPHAGEAL STENT PLACEMENT;  Surgeon: Micky Locke MD;  [facility];  Service: Thoracic    ESOPHAGOGASTRODUODENOSCOPY N/A 2/23/2018    Procedure: ESOPHAGOGASTRODUODENOSCOPY (EGD) WITH REMOVAL ESOPHAGEAL STENT  AND REPLACEMENT WITH 23mm X155mm [address];  Surgeon: Micky Locke MD;  [facility];  Service: Thoracic    ESOPHAGOGASTRODUODENOSCOPY N/A 3/28/2018    Procedure: ESOPHAGOGASTRODUODENOSCOPY (EGD) with PEJ placement ;  Surgeon: Fabián Sims MD;  [facility];   Service: Gastroenterology  ESOPHAGOGASTRODUODENOSCOPY N/A 4/5/2018    Procedure: ESOPHAGOGASTRODUODENOSCOPY (EGD) with botox injection and kaofed placement;  Surgeon: Joel Galdamez DO;  [facility]; Service: Gastroenterology    ESOPHAGOGASTRODUODENOSCOPY N/A 4/10/2018    Procedure: ESOPHAGOGASTRODUODENOSCOPY (EGD) with Kaofed placement;  Surgeon: Get Mcgrath MD;  [facility]; Service: Gastroenterology    ESOPHAGOSCOPY WITH STENT INSERTION N/A 1/24/2018    Procedure: INSERTION STENT ESOPHAGEAL;  Surgeon: Edgardo Skinner MD;  [facility]; Service: Gastroenterology    EYE SURGERY Bilateral 1980    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)     FL RETROGRADE PYELOGRAM  6/24/2020    FL RETROGRADE PYELOGRAM  8/21/2021    GASTRIC BYPASS  12/19/2016    Lap sleeve gastrectomy w/shunt length shortening procedure    HIATAL HERNIA REPAIR      HYSTERECTOMY  03/14/2013    IR LUMBAR PUNCTURE  8/29/2018    LAPAROTOMY N/A 1/25/2018    Procedure: Exploratory Laparotomy, wash out,placement of drains, placement of NG feeding tube ;   Surgeon: Jaden Romo DO;  [facility];  Service: General    LUMBAR PERITONEAL SHUNT  11/19/2015    Laparoscopic assisted    OR BREAST REDUCTION Bilateral 3/25/2022    Procedure: BILATERAL BREAST REDUCTION;  Surgeon: Fuad Zacarias MD;  [facility];  Service: Plastics    OR CREATE SHUNT:VENTRIC-ATRIAL Right 12/18/2019    Procedure: IMAGE GUIDED INSERTION OF RIGHT CORONAL VENTRICULAR-ATRIAL SHUNT;  Surgeon: Anoop Ledbetter MD;  [facility];  Service: Neurosurgery    OR CREATE SHUNT:VENTRIC-PERITONEAL Right 5/31/2017    Procedure: IMAGE GUIDED CORONAL PLACEMENT OF PROGRAMABLE VENTRICULAR-PERITONEAL SHUNT, REMOVAL OF LP SHUNT ;  Surgeon: Anoop Ledbetter MD;  [facility];  Service: Neurosurgery    OR CYSTO/URETERO W/LITHOTRIPSY &INDWELL STENT INSRT Bilateral 6/24/2020    Procedure: CYSTOSCOPY URETEROSCOPY WITH LITHOTRIPSY HOLMIUM LASER, RETROGRADE PYELOGRAM AND exchange bilateral  STENTs URETERAL;  Surgeon: Rosalie Mcdonough MD;  [facility];  Service: Urology    MD CYSTO/URETERO W/LITHOTRIPSY &INDWELL STENT INSRT Left 8/21/2021    Procedure: CYSTOSCOPY; LEFT URETEROSCOPY WITH RETROGRADE PYELOGRAM, REMOVAL OF STONE AND INSERTION LEFT URETERAL STENT;  Surgeon: Andrea Zhao MD;  [facility];  Service: Urology    MD CYSTOURETHROSCOPY,URETER CATHETER N/A 6/6/2020    Procedure: Bilateral CYSTOSCOPY RETROGRADE PYELOGRAM WITH INSERTION STENT URETERAL;  Surgeon: Alvarado Yanes MD;  Location: [address] MAIN OR;  Service: Urology    MD EGD TRANSORAL BIOPSY SINGLE/MULTIPLE N/A 6/27/2018    Procedure: ESOPHAGOGASTRODUODENOSCOPY (EGD) with padlock clip placement;  Surgeon: Christina Eddy MD;  [facility];   Service: [address] CSF SHUNT,W/O REPLACE Right 2/5/2018    Procedure: Removal of  shunt;  Surgeon: Anderson Rollins MD;  [facility];  Service: Neurosurgery    MD REPLACEMENT/REVISION,CSF SHUNT Right 1/25/2018    Procedure: Externalization of right-sided SHUNT VENTRICULAR-PERITONEAL in anterior chest wall ribs two and three level  ;  Surgeon: Christine Hoffman MD;  [facility];  Service: Neurosurgery    WRIST SURGERY Right     x3 2006, 2008        Current Outpatient Medications:     acetaminophen (TYLENOL) 500 mg tablet, Take 1 tablet (500 mg total) by mouth every 6 (six) hours as needed for mild pain or moderate pain, Disp: 30 tablet, Rfl: 2    ascorbic acid (VITAMIN C) 250 MG CHEW, Chew 250 mg daily, Disp: , Rfl:     Biotin 1 MG CAPS, Take by mouth daily  , Disp: , Rfl:     calcium citrate-vitamin D (CITRACAL+D) 315-200 MG-UNIT per tablet, Take 1 tablet by mouth 2 (two) times a day, Disp: , Rfl:     docusate sodium (COLACE) 100 mg capsule, Take 1 capsule (100 mg total) by mouth 2 (two) times a day, Disp: 20 capsule, Rfl: 2    esomeprazole (NexIUM) 40 MG capsule, Take 1 capsule (40 mg total) by mouth 2 (two) times a day before meals, Disp: 60 capsule, Rfl: 0    ferrous gluconate 324 (37 5 Fe) mg, Take 324 mg by mouth 2 (two) times a day before meals, Disp: , Rfl:     folic acid (FOLVITE) 1 mg tablet, Take 1 tablet (1 mg total) by mouth daily, Disp: 90 tablet, Rfl: 4    Multiple Vitamin (MULTIVITAMIN) tablet, Take 1 tablet by mouth daily Wears a patch, Disp: , Rfl:     Multiple Vitamins-Minerals (Hair/Skin/Nails) CAPS, Take by mouth 2 (two) times a day, Disp: , Rfl:     NON FORMULARY, , Disp: , Rfl:     omeprazole (PriLOSEC) 40 MG capsule, Take 1 capsule (40 mg total) by mouth 2 (two) times a day, Disp: 60 capsule, Rfl: 2    ondansetron (ZOFRAN) 4 mg tablet, Take 1 tablet (4 mg total) by mouth every 8 (eight) hours as needed for nausea or vomiting, Disp: 20 tablet, Rfl: 0    oxyCODONE (Roxicodone) 5 immediate release tablet, Take 1 tablet (5 mg total) by mouth every 4 (four) hours as needed for severe pain for up to 10 days Max Daily Amount: 30 mg, Disp: 30 tablet, Rfl: 0    vitamin B-12 (VITAMIN B-12) 500 mcg tablet, Take 500 mcg by mouth daily, Disp: , Rfl:   Allergies: Benadryl [diphenhydramine] and Phenergan [promethazine]    Objective      Breasts are soft, symmetric, tender on left breast  Positive sensation of areolas  Incisions are intact throughout  Abdomen is soft  Assessment/Plan   Diagnoses and all orders for this visit:    Macromastia    S/P bilateral breast reduction    Adaptic to areolas  ABD's    Advised to drink fluids  Ambulate  Stool softener  Hold narcotics  Followup in one week      Kim Castro PA-C

## 2022-04-03 DIAGNOSIS — K21.9 GASTROESOPHAGEAL REFLUX DISEASE, UNSPECIFIED WHETHER ESOPHAGITIS PRESENT: ICD-10-CM

## 2022-04-03 RX ORDER — OMEPRAZOLE 40 MG/1
CAPSULE, DELAYED RELEASE ORAL
Qty: 60 CAPSULE | Refills: 2 | Status: SHIPPED | OUTPATIENT
Start: 2022-04-03 | End: 2022-07-06

## 2022-04-04 ENCOUNTER — TELEPHONE (OUTPATIENT)
Dept: GASTROENTEROLOGY | Facility: MEDICAL CENTER | Age: 47
End: 2022-04-04

## 2022-04-04 NOTE — TELEPHONE ENCOUNTER
----- Message from Oliver Biggs MD sent at 3/23/2022  4:39 PM EDT -----  Hello  Please have her see me in office in 4 - 6 weeks  Thanks

## 2022-04-04 NOTE — TELEPHONE ENCOUNTER
Per Dr Ector Murray ok to add onto 06/060/2022 at 12:20 am @ [place] office  Spoke to patient and appt date and time is ok with her

## 2022-04-07 ENCOUNTER — OFFICE VISIT (OUTPATIENT)
Dept: PLASTIC SURGERY | Facility: CLINIC | Age: 47
End: 2022-04-07

## 2022-04-07 DIAGNOSIS — Z98.890 S/P BILATERAL BREAST REDUCTION: Primary | ICD-10-CM

## 2022-04-07 PROCEDURE — 99024 POSTOP FOLLOW-UP VISIT: CPT | Performed by: PHYSICIAN ASSISTANT

## 2022-04-07 RX ORDER — GABAPENTIN 300 MG/1
300 CAPSULE ORAL 3 TIMES DAILY
Qty: 42 CAPSULE | Refills: 0 | Status: SHIPPED | OUTPATIENT
Start: 2022-04-07 | End: 2022-04-21

## 2022-04-07 NOTE — PROGRESS NOTES
POST-OP EVALUATION  4/7/2022    Subjective   Shweta DIAZ Emily Boyer is a 51 y o  female is here today for routine post-operative follow up  The patient underwent bilateral breast reduction on 03/25/2022  She has had some postop constipation and nausea  She still is having pain particularly on the left side under her axilla  Past Medical History:   Diagnosis Date    Abdominal pain     Acute cystitis with hematuria 1/3/2020    Brain condition     Pseudotumor Cerebri     Chronic kidney disease     De Quervain's disease (tenosynovitis)     Esophageal perforation     Gastric leak     GERD (gastroesophageal reflux disease)     Headache     Idiopathic intracranial hypertension     Kidney stone     Migraine     No blood products     per pt: personal and [employer] beliefs  surgeon office aware 12/13/19    Papilledema, both eyes     PONV (postoperative nausea and vomiting)     Postgastrectomy malabsorption     Presence of lumboperitoneal shunt     Resolved: Sep 20, 2017    Rotator cuff tendinitis     Resolved: Aug 23, 2017    Visual field defect      Past Surgical History:   Procedure Laterality Date    BREAST BIOPSY Left 1993    benign    CSF SHUNT      LP shunt - 2015 -  shunt - 2017    ESOPHAGOGASTRODUODENOSCOPY N/A 2/23/2018    Procedure: ESOPHAGOGASTRODUODENOSCOPY (EGD) WITH ESOPHAGEAL STENT PLACEMENT;  Surgeon: Sandrine Godfrey MD;  [facility];  Service: Thoracic    ESOPHAGOGASTRODUODENOSCOPY N/A 2/23/2018    Procedure: ESOPHAGOGASTRODUODENOSCOPY (EGD) WITH REMOVAL ESOPHAGEAL STENT  AND REPLACEMENT WITH 23mm X155mm [address] [place] [address];  Surgeon: Sandrine Godfrey MD;  [facility];  Service: Thoracic    ESOPHAGOGASTRODUODENOSCOPY N/A 3/28/2018    Procedure: ESOPHAGOGASTRODUODENOSCOPY (EGD) with PEJ placement ;  Surgeon: Rodrick Phan MD;  [facility];   Service: Gastroenterology    ESOPHAGOGASTRODUODENOSCOPY N/A 4/5/2018    Procedure: ESOPHAGOGASTRODUODENOSCOPY (EGD) with botox injection and kaofed placement;  Surgeon: Juan Carlos Nino DO;  [facility]; Service: Gastroenterology    ESOPHAGOGASTRODUODENOSCOPY N/A 4/10/2018    Procedure: ESOPHAGOGASTRODUODENOSCOPY (EGD) with Kaofed placement;  Surgeon: Vidya Mendez MD;  [facility]; Service: Gastroenterology    ESOPHAGOSCOPY WITH STENT INSERTION N/A 1/24/2018    Procedure: INSERTION STENT ESOPHAGEAL;  Surgeon: Naomi Washington MD;  [facility]; Service: Gastroenterology    EYE SURGERY Bilateral 1980    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)     FL RETROGRADE PYELOGRAM  6/24/2020    FL RETROGRADE PYELOGRAM  8/21/2021    GASTRIC BYPASS  12/19/2016    Lap sleeve gastrectomy w/shunt length shortening procedure    HIATAL HERNIA REPAIR      HYSTERECTOMY  03/14/2013    IR LUMBAR PUNCTURE  8/29/2018    LAPAROTOMY N/A 1/25/2018    Procedure: Exploratory Laparotomy, wash out,placement of drains, placement of NG feeding tube ;   Surgeon: Aly Vasques DO;  [facility];  Service: General    LUMBAR PERITONEAL SHUNT  11/19/2015    Laparoscopic assisted    AL BREAST REDUCTION Bilateral 3/25/2022    Procedure: BILATERAL BREAST REDUCTION;  Surgeon: Ephraim Marley MD;  [facility];  Service: Plastics    AL CREATE SHUNT:VENTRIC-ATRIAL Right 12/18/2019    Procedure: IMAGE GUIDED INSERTION OF RIGHT CORONAL VENTRICULAR-ATRIAL SHUNT;  Surgeon: Tor Curran MD;  [facility];  Service: Neurosurgery    AL CREATE SHUNT:VENTRIC-PERITONEAL Right 5/31/2017    Procedure: IMAGE GUIDED CORONAL PLACEMENT OF PROGRAMABLE VENTRICULAR-PERITONEAL SHUNT, REMOVAL OF LP SHUNT ;  Surgeon: Tor Curran MD;  [facility];  Service: Neurosurgery    AL CYSTO/URETERO W/LITHOTRIPSY &INDWELL STENT INSRT Bilateral 6/24/2020    Procedure: CYSTOSCOPY URETEROSCOPY WITH LITHOTRIPSY HOLMIUM LASER, RETROGRADE PYELOGRAM AND exchange bilateral  STENTs URETERAL;  Surgeon: Monie Barbosa Yves Miranda MD;  [facility];  Service: Urology    ME CYSTO/URETERO W/LITHOTRIPSY &INDWELL STENT INSRT Left 8/21/2021    Procedure: CYSTOSCOPY; LEFT URETEROSCOPY WITH RETROGRADE PYELOGRAM, REMOVAL OF STONE AND INSERTION LEFT URETERAL STENT;  Surgeon: Jay Loaiza MD;  [facility];  Service: Urology    ME CYSTOURETHROSCOPY,URETER CATHETER N/A 6/6/2020    Procedure: Bilateral CYSTOSCOPY RETROGRADE PYELOGRAM WITH INSERTION STENT URETERAL;  Surgeon: Mary Garay MD;  Location: [address] MAIN OR;  Service: Urology    ME EGD TRANSORAL BIOPSY SINGLE/MULTIPLE N/A 6/27/2018    Procedure: ESOPHAGOGASTRODUODENOSCOPY (EGD) with padlock clip placement;  Surgeon: Joseph Henry MD;  [facility];   Service: [address] CSF SHUNT,W/O REPLACE Right 2/5/2018    Procedure: Removal of  shunt;  Surgeon: Ciaran Lagunas MD;  [facility];  Service: Neurosurgery    ME REPLACEMENT/REVISION,CSF SHUNT Right 1/25/2018    Procedure: Externalization of right-sided SHUNT VENTRICULAR-PERITONEAL in anterior chest wall ribs two and three level  ;  Surgeon: Minal Sanchez MD;  [facility];  Service: Neurosurgery    WRIST SURGERY Right     x3 2006, 2008        Current Outpatient Medications:     acetaminophen (TYLENOL) 500 mg tablet, Take 1 tablet (500 mg total) by mouth every 6 (six) hours as needed for mild pain or moderate pain, Disp: 30 tablet, Rfl: 2    ascorbic acid (VITAMIN C) 250 MG CHEW, Chew 250 mg daily, Disp: , Rfl:     Biotin 1 MG CAPS, Take by mouth daily  , Disp: , Rfl:     calcium citrate-vitamin D (CITRACAL+D) 315-200 MG-UNIT per tablet, Take 1 tablet by mouth 2 (two) times a day, Disp: , Rfl:     docusate sodium (COLACE) 100 mg capsule, Take 1 capsule (100 mg total) by mouth 2 (two) times a day, Disp: 20 capsule, Rfl: 2    esomeprazole (NexIUM) 40 MG capsule, Take 1 capsule (40 mg total) by mouth 2 (two) times a day before meals, Disp: 60 capsule, Rfl: 0    ferrous gluconate 324 (37 5 Fe) mg, Take 324 mg by mouth 2 (two) times a day before meals, Disp: , Rfl:     folic acid (FOLVITE) 1 mg tablet, Take 1 tablet (1 mg total) by mouth daily, Disp: 90 tablet, Rfl: 4    gabapentin (Neurontin) 300 mg capsule, Take 1 capsule (300 mg total) by mouth 3 (three) times a day for 14 days, Disp: 42 capsule, Rfl: 0    Multiple Vitamin (MULTIVITAMIN) tablet, Take 1 tablet by mouth daily Wears a patch, Disp: , Rfl:     Multiple Vitamins-Minerals (Hair/Skin/Nails) CAPS, Take by mouth 2 (two) times a day, Disp: , Rfl:     NON FORMULARY, , Disp: , Rfl:     omeprazole (PriLOSEC) 40 MG capsule, take 1 capsule by mouth twice a day, Disp: 60 capsule, Rfl: 2    ondansetron (ZOFRAN) 4 mg tablet, Take 1 tablet (4 mg total) by mouth every 8 (eight) hours as needed for nausea or vomiting, Disp: 20 tablet, Rfl: 0    vitamin B-12 (VITAMIN B-12) 500 mcg tablet, Take 500 mcg by mouth daily, Disp: , Rfl:   Allergies: Benadryl [diphenhydramine] and Phenergan [promethazine]    Objective      Breasts are soft, symmetric, tender on left breast  Positive sensation of areolas  There is tenderness to palpation along the left lateral incision  There is no fullness, induration, warmth  Incisions are intact throughout  Abdomen is soft  Assessment/Plan   Diagnoses and all orders for this visit:    S/P bilateral breast reduction  -     gabapentin (Neurontin) 300 mg capsule; Take 1 capsule (300 mg total) by mouth 3 (three) times a day for 14 days    Gabapentin added to regular Tylenol regimen for pain  Monitor for adverse changes  Continue a surgical bra or equivalent    Follow-up in 1 week, for Prineo removal     Daniela Rider PA-C

## 2022-04-07 NOTE — DISCHARGE INSTRUCTIONS
Acute Care Surgery Discharge Instructions    Please follow-up as instructed  If you need a follow-up appointment, please call the office when you leave to schedule an appointment  Activity:  - You may resume activity as tolerated  Diet:    - You should remain nothing by mouth   - You should continue nasoenteric tube feeds as follows: Jevity 1 2 at 105 mL per hour with 100 mL free water flushes every 4 hours from 3 PM through 7 AM daily  The nasoenteric feeding tube should be flushed with 50 mL of water after administration of any medication and at the completion of the tube feeds for the day  Medications:  - You may resume all of your regular medications, including blood thinners and aspirin, after going home unless otherwise instructed  Please refer to your discharge medication list for further details  - Please take the pain medications as directed  - You are encouraged to use non-narcotic pain medications first and whenever possible  Reserve the use of narcotic pain medication for moderate to severe pain not controlled by non-narcotic medications   - No driving while taking narcotic pain medications  - You may become constipated, especially if taking pain medications  You may take any over the counter stool softeners or laxatives as needed  Examples: Milk of Magnesia, Colace, Senna  Wound care:  - Continue to manage midline abdominal wound with an ostomy appliance and/or wound manager  The appliance can be changed every 5 to 7 days and/or as needed for leakage or soilage  - ADENIKE Drain care: Please milk drain mornings and nights (and as needed)  Empty as needed and record daily output  Bring output records to follow-up appointment  Additional Instructions:    - May shower daily and/or bathe normally    - If you have any questions or concerns after discharge please call the office   - Call office or return to ER if fever greater than 101, chills, persistent nausea/vomiting, and/or worsening/uncontrollable pain  Call primary care provider for follow up after discharge/Activities as tolerated/Low salt diet/Monitor weight daily/Report signs and symptoms to primary care provider

## 2022-04-14 NOTE — TELEPHONE ENCOUNTER
Patient called in to get appointment scheduled for tomorrow for removal of string  no enlarged lymph nodes/no tender lymph nodes/no swelling of extremity

## 2022-04-15 ENCOUNTER — OFFICE VISIT (OUTPATIENT)
Dept: PLASTIC SURGERY | Facility: CLINIC | Age: 47
End: 2022-04-15

## 2022-04-15 DIAGNOSIS — Z98.890 S/P BILATERAL BREAST REDUCTION: Primary | ICD-10-CM

## 2022-04-15 PROCEDURE — 99024 POSTOP FOLLOW-UP VISIT: CPT | Performed by: PHYSICIAN ASSISTANT

## 2022-04-18 NOTE — PROGRESS NOTES
Assessment/Plan:     Patient is a 57-year-old female who is status post bilateral breast reduction with Dr Jaylyn Awad on 03/25/2022  Please see HPI  Patient presents to the office today for routine postoperative check  Incisions are healing well  Patient continues to endorse pain and swelling  The patient will continue to wear a bra without an underwire at all times except when showering  She was advised to apply ice as needed  She would like to discontinue gabapentin, as it makes her groggy"  Patient advised to use Tylenol as needed for pain  She will return to the office in approximately 2 weeks for a postoperative check and to compare before and after photographs  We will discuss scar treatment at that time  She is advised to call the office if any questions or concerns arise prior to that scheduled appointment  Diagnoses and all orders for this visit:    S/P bilateral breast reduction          Subjective:     Patient ID: Fred Srivastava is a 51 y o  female  HPI     Patient reports that she has had significant pain and burning in her bilateral breast postoperatively  She reports no issues with her incisions but is very concerned about her pain level  Patient was overall pleased with the results of her surgery  Discussed nonpharmacologic pain treatment with the patient  She was agreeable, as she states that most oral pain medications make her feel nauseous or tired  No additional complaints or concerns today  Review of Systems    See HPI    Objective:     Physical Exam      Patient's by lateral breast incisions are clean, dry and intact  Healing well  Breasts are soft and symmetric

## 2022-04-21 ENCOUNTER — TELEPHONE (OUTPATIENT)
Dept: GASTROENTEROLOGY | Facility: MEDICAL CENTER | Age: 47
End: 2022-04-21

## 2022-04-21 NOTE — TELEPHONE ENCOUNTER
Please reschedule patient follow up appointment with Dr Lady Rosario  Had to be moved due to Dr Gokul Huggins schedule change

## 2022-05-04 ENCOUNTER — OFFICE VISIT (OUTPATIENT)
Dept: NEUROLOGY | Facility: CLINIC | Age: 47
End: 2022-05-04
Payer: MEDICARE

## 2022-05-04 ENCOUNTER — OFFICE VISIT (OUTPATIENT)
Dept: PLASTIC SURGERY | Facility: CLINIC | Age: 47
End: 2022-05-04

## 2022-05-04 ENCOUNTER — TELEPHONE (OUTPATIENT)
Dept: NEUROLOGY | Facility: CLINIC | Age: 47
End: 2022-05-04

## 2022-05-04 VITALS
TEMPERATURE: 97.9 F | WEIGHT: 145.2 LBS | DIASTOLIC BLOOD PRESSURE: 68 MMHG | BODY MASS INDEX: 25.73 KG/M2 | HEIGHT: 63 IN | SYSTOLIC BLOOD PRESSURE: 120 MMHG | HEART RATE: 58 BPM

## 2022-05-04 DIAGNOSIS — Z98.890 S/P BILATERAL BREAST REDUCTION: ICD-10-CM

## 2022-05-04 DIAGNOSIS — G43.109 MIGRAINE WITH AURA AND WITHOUT STATUS MIGRAINOSUS, NOT INTRACTABLE: Primary | ICD-10-CM

## 2022-05-04 DIAGNOSIS — G93.2 PSEUDOTUMOR CEREBRI: ICD-10-CM

## 2022-05-04 DIAGNOSIS — H47.10 PAPILLEDEMA: ICD-10-CM

## 2022-05-04 DIAGNOSIS — D31.31 CHOROIDAL NEVUS, RIGHT EYE: Primary | ICD-10-CM

## 2022-05-04 PROCEDURE — 99024 POSTOP FOLLOW-UP VISIT: CPT | Performed by: PHYSICIAN ASSISTANT

## 2022-05-04 PROCEDURE — 99215 OFFICE O/P EST HI 40 MIN: CPT | Performed by: PHYSICIAN ASSISTANT

## 2022-05-04 RX ORDER — BUTALBITAL, ACETAMINOPHEN AND CAFFEINE 50; 325; 40 MG/1; MG/1; MG/1
1 TABLET ORAL EVERY 6 HOURS PRN
Qty: 5 TABLET | Refills: 4 | Status: SHIPPED | OUTPATIENT
Start: 2022-05-04 | End: 2022-06-03

## 2022-05-04 NOTE — PATIENT INSTRUCTIONS
Preventive:  Continue all your vitamins  We will try to authorize Vyepti 100 mg every 3 months  Other options are the qulipta or nurtec by mouth or other injections such as aimovig or ajovy    Abortive:    If needed use Fioricet but only 5 month

## 2022-05-04 NOTE — PROGRESS NOTES
Assessment/Plan:     Patient is a 28-year-old female who is status post bilateral breast reduction with Dr Rody Juárez on 03/25/2022  Please see HPI    Patient presents to the office today for routine postoperative check  Overall, she is extremely pleased with her results and states that she has had drastic reduction in her back and neck pain as well as rashes  The patient will return to the office in 6 weeks for the 3 month postop check and for photos  Diagnoses and all orders for this visit:    Choroidal nevus, right eye    S/P bilateral breast reduction          Subjective:     Patient ID: Shannon Mccarty is a 51 y o  female  HPI     The patient states that she has been doing well  She does note that she has a slight asymmetry in her breast   Overall, she is extremely pleased  No issues  Review of Systems    See HPI    Objective:     Physical Exam      Bilateral incisions are completely healed  Breasts are soft and supple and grossly symmetric, with slight increase in fullness on the right

## 2022-05-04 NOTE — PROGRESS NOTES
[facility]  PATIENT:  Cynthia Stokes  MRN:  [id number]  :  1975  DATE OF SERVICE:  2022      Assessment/Plan:     Migraine with aura and without status migrainosus, not intractable  Will auth for vyepti 100 mg every 3 months    At onset of migraine, may use fioricet if needed  Limit of 5 a month    Pseudotumor cerebri  Patient has v/p shunt  Stable at this tiem    Papilledema  Continue to follow closely with ophthalmology         Problem List Items Addressed This Visit        Cardiovascular and Mediastinum    Migraine with aura and without status migrainosus, not intractable - Primary     Will auth for vyepti 100 mg every 3 months    At onset of migraine, may use fioricet if needed  Limit of 5 a month         Relevant Medications    butalbital-acetaminophen-caffeine (FIORICET,ESGIC) -40 mg per tablet       Nervous and Auditory    Pseudotumor cerebri     Patient has v/p shunt  Stable at this tiem         Papilledema     Continue to follow closely with ophthalmology                   History of Present Illness: We had the pleasure of evaluating Shweta Ramirez in neurological follow up  today for headaches  As you know,  she is a 51 y o   right handed female       Past medical history:  Suki Gal to [facility] for hiatal hernia and was in ICU for a long time   Was septic after her surgeries and had her  shunt removed 2018 due to concerns of bacterial infection spreading through the shunt     Patient complains of pain in the right side of head like had been hit  Roberta Willson has "zingers" 1-2 a week and lasts a minute to 30 minutes   Sometimes has to lay down for the rest of the day   and hears a vibration noise for 2-3 week   Sometimes is very loud and annoying   This pain began in mid November  Celia First is the same as before  Celia First is similar to prior to shunt replacement  Ladi Pretty is only new symptoms       Idiopathic intracranial hypertension:  She is s/p lumboperitoneal shunt placement for increased intracranial pressure, performed at [facility] in 2015 and removal of the shunt and placement of a  shunt in May of 2017  She is also status post gastric bypass surgery and has lost 60lbs       She saw her ophthalmologist 3/2020 and there was no optic nerve swelling      Patient reports she has daily pressure and pain  Has good days and bad days but is always has pressure        What medications do you take or have you taken for your headaches?    Current Preventative:  Vitamin-C vitamin-D folic acid multivitamin vitamin B12    Prior PREVENTIVE:  -  Nortriptyline (this helped her headaches),  Protriptyline  - Gabapentin (off balance), Depakote, lamictal  -  Diamox (stopped by provider because of her history of kidney stones),   - Verapamil (fatigue),  - Emgality (3 doses, didn't notice a difference)  Prior ABORTIVE: Fioricet (stopped on her own), Tramadol (stopped on her own), Toradol (stopped on her own), Indomethacin     Non-Medical/Alternative Treatments used in the past for headaches: Has tried Massage (doesnt help headaches), Chiropractic adjustment (doesnt help with headaches), Acupuncture (doesnt help with headaches)     What is your current pain level - 7-8/10,     How often do the headaches occur -   Baseline pain is : daily is 2-3/10  Increase pain: does have variable days of increasing pain   Pain can increase for 30 minutes or last all day every couple of days  Can have 10/10 pain once a week (when weather is bad)  She is also getting the sensation of light headed and pain in the frontal region with bending over or getting up from sitting potision      What time of the day do the headaches start -   Baseline pain: there all the time  Increase pain: anytime of the day     How long do the headaches last -   Baseline pain is : there all the time  Increase pain: few minutes to rest of the day  She is also getting the sensation of light headed and pain in the frontal region with bending over or getting up from sitting poistion-this is also variable, usually happens 1-2 times a week   Lasts only a minute or 2     Are you ever headache free - no always has pressure in her head     Where are they located -   Baseline pain: entire head  Increase pain: one temple to the other or frontal to the back of her head  Position change: frontal only     What is the intensity of pain -   Baseline pain: 2-4/10  Intense pain: 10/10  Position change pain: 9/10     Any warning prior to headache and how long do they last - N/A, they're constant     Describe your usual headache -   Baseline pain: Pressure, "like my brain is going to explode out of my skull"  Increase pain: zap, zinger and it can be sharp (pt has difficulty describing the sensation)  Position change: sharp pressure     Associated symptoms:   -       Problem with concentration  -       Blurred vision occasionally  -       Dizziness when extending or flexing neck to look up or down  -       prefer to be alone and in a dark room, unable to work     Headache are worse if the patient: cough, sneeze, bending over, moving bowel, running or exercising,   Headache trigger: N/A, they're constant     Have you used CBD or THC for your headaches and how often? Yes, has medical card  Are you current pregnant or planning on getting pregnant? No  Have you ever had any Brain imaging? yes  I personally reviewed these images         MRAs of the head and neck without contrast done on 3/29/17 are both unremarkable      Brain MRI without contrast on 3/27/17 shows ventricles in the lower limits of normal in size consistent with pseudotumor cerebri, as well as scattered parenchymal WM changes which are nonspecific      Head CT 7/17: No acute intracranial abnormality      LP @ [facility] April/ 2017: OP 21, CP14     Reviewed old notes from physician seen in the past- see above HPI for summary of previous encounters                  Past Medical History:   Diagnosis Date    Abdominal pain     Acute cystitis with hematuria 1/3/2020    Brain condition     Pseudotumor Cerebri     Chronic kidney disease     De Quervain's disease (tenosynovitis)     Esophageal perforation     Gastric leak     GERD (gastroesophageal reflux disease)     Headache     Idiopathic intracranial hypertension     Kidney stone     Migraine     No blood products     per pt: personal and Jehovah's witness beliefs   surgeon office aware 12/13/19    Papilledema, both eyes     PONV (postoperative nausea and vomiting)     Postgastrectomy malabsorption     Presence of lumboperitoneal shunt     Resolved: Sep 20, 2017    Rotator cuff tendinitis     Resolved: Aug 23, 2017    Visual field defect        Patient Active Problem List   Diagnosis    Pseudotumor cerebri    S/P  shunt    Murmur, cardiac    Refusal of blood product    Gastroesophageal reflux disease    Choroidal nevus, right eye    Moderate protein-calorie malnutrition (HCC)    Bariatric surgery status    Preoperative clearance    Refusal of influenza vaccine by provider    Renal calculi    PTSD (post-traumatic stress disorder)    History of idiopathic intracranial hypertension    Chronic tension-type headache, intractable    Postgastrectomy malabsorption    Papilledema    Family history of malignant neoplasm of gastrointestinal tract    Hypercholesteremia    Subacromial bursitis of right shoulder joint    Mixed incontinence    Hydroureteronephrosis    Heart burn    Encounter for surgical aftercare following surgery of digestive system    Migraine with aura and without status migrainosus, not intractable    Anemia    Colon cancer screening    Iron deficiency anemia secondary to inadequate dietary iron intake    Annual physical exam    COVID-19 vaccination refused    Macromastia    S/P bilateral breast reduction       Medications:      Current Outpatient Medications   Medication Sig Dispense Refill    acetaminophen (TYLENOL) 500 mg tablet Take 1 tablet (500 mg total) by mouth every 6 (six) hours as needed for mild pain or moderate pain 30 tablet 2    ascorbic acid (VITAMIN C) 250 MG CHEW Chew 250 mg daily      Biotin 1 MG CAPS Take 1 mg by mouth daily        calcium citrate-vitamin D (CITRACAL+D) 315-200 MG-UNIT per tablet Take 1 tablet by mouth 2 (two) times a day      docusate sodium (COLACE) 100 mg capsule Take 1 capsule (100 mg total) by mouth 2 (two) times a day (Patient taking differently: Take 100 mg by mouth 2 (two) times a day as needed  ) 20 capsule 2    esomeprazole (NexIUM) 40 MG capsule Take 1 capsule (40 mg total) by mouth 2 (two) times a day before meals 60 capsule 0    ferrous gluconate 324 (37 5 Fe) mg Take 324 mg by mouth 2 (two) times a day before meals      folic acid (FOLVITE) 1 mg tablet Take 1 tablet (1 mg total) by mouth daily 90 tablet 4    Multiple Vitamin (MULTIVITAMIN) tablet Take 1 tablet by mouth daily Wears a patch      Multiple Vitamins-Minerals (Hair/Skin/Nails) CAPS Take 1 tablet by mouth 2 (two) times a day        NON FORMULARY       omeprazole (PriLOSEC) 40 MG capsule take 1 capsule by mouth twice a day 60 capsule 2    vitamin B-12 (VITAMIN B-12) 500 mcg tablet Take 500 mcg by mouth daily      butalbital-acetaminophen-caffeine (FIORICET,ESGIC) -40 mg per tablet Take 1 tablet by mouth every 6 (six) hours as needed for headaches or migraine Only 10 per month 5 tablet 4    gabapentin (Neurontin) 300 mg capsule Take 1 capsule (300 mg total) by mouth 3 (three) times a day for 14 days (Patient not taking: Reported on 5/4/2022 ) 42 capsule 0    ondansetron (ZOFRAN) 4 mg tablet Take 1 tablet (4 mg total) by mouth every 8 (eight) hours as needed for nausea or vomiting (Patient not taking: Reported on 5/4/2022 ) 20 tablet 0     No current facility-administered medications for this visit  Allergies:       Allergies   Allergen Reactions    Benadryl [Diphenhydramine] Anaphylaxis Throat closing    Phenergan [Promethazine] Anaphylaxis       Family History:     Family History   Problem Relation Age of Onset    No Known Problems Mother     No Known Problems Father     Thyroid cancer Brother 36    Colon cancer Maternal Grandfather 44    No Known Problems Paternal Grandmother     Cancer Paternal Grandfather     Cancer Family         Gastric    Leukemia Family     Colon cancer Family     Pancreatic cancer Family     No Known Problems Sister     No Known Problems Daughter     No Known Problems Maternal Grandmother     No Known Problems Son     No Known Problems Maternal Aunt     No Known Problems Maternal Aunt     No Known Problems Paternal Aunt        Social History:     Social History     Socioeconomic History    Marital status: Single     Spouse name: Not on file    Number of children: 2    Years of education: High School or GED     Highest education level: Not on file   Occupational History    Occupation: employed    Tobacco Use    Smoking status: Former Smoker     Packs/day: 0 50     Years: 20 00     Pack years: 10 00     Types: Cigarettes     Quit date: 2012     Years since quittin 6    Smokeless tobacco: Never Used   Vaping Use    Vaping Use: Every day    Substances: THC, CBD   Substance and Sexual Activity    Alcohol use: Never    Drug use: Yes     Frequency: 7 0 times per week     Types: Marijuana     Comment: medical marijuana, vaping & topical     Sexual activity: Yes   Other Topics Concern    Not on file   Social History Narrative    ** Merged History Encounter **          Social Determinants of Health     Financial Resource Strain: Not on file   Food Insecurity: Not on file   Transportation Needs: Not on file   Physical Activity: Not on file   Stress: Not on file   Social Connections: Not on file   Intimate Partner Violence: Not on file   Housing Stability: Not on file      I have reviewed the patient's medical, social and surgical history as well as medications in detail and updated the computerized patient record  Objective:   Physical Exam:                                                                   Vitals:            /68 (BP Location: Right arm, Patient Position: Sitting, Cuff Size: Standard)   Pulse 58   Temp 97 9 °F (36 6 °C) (Temporal)   Ht 5' 3" (1 6 m)   Wt 65 9 kg (145 lb 3 2 oz)   LMP  (LMP Unknown)   BMI 25 72 kg/m²   BP Readings from Last 3 Encounters:   05/04/22 120/68   03/25/22 100/60   03/21/22 108/78     Pulse Readings from Last 3 Encounters:   05/04/22 58   03/25/22 76   03/21/22 66          CONSTITUTIONAL: Well developed, well nourished, well groomed  No dysmorphic features  Eyes:  EOM normal      Neck:  Normal ROM, neck supple  HEENT:  Normocephalic atraumatic  Chest:  Respirations regular and unlabored  Psychiatric:  Normal behavior and appropriate affect      MENTAL STATUS  Orientation: Alert and oriented x 3  Fund of knowledge: Intact  MOTOR (Upper and lower extremities)   Bulk/tone/abnormal movement: Normal muscle bulk and tone  COORDINATION   Station/Gait: Normal baseline gait  Review of Systems:   Review of Systems  Constitutional: Negative  HENT: Negative  Eyes: Negative  Respiratory: Negative  Cardiovascular: Negative  Gastrointestinal: Negative  Endocrine: Negative  Genitourinary: Negative  Musculoskeletal: Negative  Skin: Negative  Allergic/Immunologic: Negative  Neurological: Positive for headaches  Hematological: Negative  Psychiatric/Behavioral: Negative      I personally reviewed the ROS entered by the MA    I spent 20 minutes in face-to-face discussion regarding  the pathophysiology of her current symptoms and further plan, as well as counseling, educating, and coordinating the patient's care including pathogenesis of diagnosis, prognosis of diagnosis, diagnostic results, impression, and recommendations, risks and benefits of treatment, instructions for disease self management, treatment instructions and follow up requirements and spent 20 minutes non-face to face    Author:  Ruth Burnett PA-C 5/4/2022 1:02 PM

## 2022-05-04 NOTE — ASSESSMENT & PLAN NOTE
Will auth for vyepti 100 mg every 3 months    At onset of migraine, may use fioricet if needed    Limit of 5 a month

## 2022-05-04 NOTE — TELEPHONE ENCOUNTER
Called [facility] at  [phone], spoke w/ Leo Suazo and states that Vyepti - PA required  But PA not required for admin codes [address] [facility] and [id number]  [address] [facility]; tax ID [id number]  NPI: [id number] facility  I was transferred to another dept to initiate PA for Vyepti  I was on hold for>45 min      Will call tmrforrest

## 2022-05-09 NOTE — TELEPHONE ENCOUNTER
Called [facility] at  [phone] to initiated Kraig Vargas  Spoke w/ Antonio Cota and advised of all of the below  I was then transferred to their utilization dept   (internal #)  Left a message on answering machine for a call back      Awaiting call

## 2022-05-11 NOTE — TELEPHONE ENCOUNTER
Called [facility] at  [phone], spoke w/ Juan Simons and advised of the below  States that PA form must be completed   She will fax the PA form to [phone] and [phone] (backline)    Awaiting form

## 2022-05-11 NOTE — TELEPHONE ENCOUNTER
PA form received and completed  Faxed copy of the office notes dated 5/4/22 and completed PA to [phone] and placed in clerical bin to be scanned  [place] as urgent  Awaiting determination

## 2022-05-13 NOTE — TELEPHONE ENCOUNTER
pt made aware of below, she is concerned with another injectable as she had problems with the vein in her leg where she injected emgaltiy in the past,  states that she never injected emgality in her stomach  states that she doesn't want to put it in her stomach with everything that has gone on       she is asking if nurtec causes reflux as she states that has alot of GI issues  currently seeing GI    she is agreeable to nurtec if it doesn't cause reflux      Please advise  [phone] to leave detailed message

## 2022-05-13 NOTE — TELEPHONE ENCOUNTER
Please let patient know she must try either Aimovig or Nurtec first   Not sure from reading denial if needs to fail both before Vypeti or not    See which she would prefer so I can send in  Thanks

## 2022-05-13 NOTE — TELEPHONE ENCOUNTER
Nurtec can cause reflux    Let's try to see if we can get an exemption to move forward with Vyepti as nurtec is contraindicated with her level of GERD and Aimovig would be contraindicated with her adverse reaction with [facility]

## 2022-05-16 NOTE — TELEPHONE ENCOUNTER
Called [facility] at  [phone], spoke w/ Myrna Hough and advised of all of the below  She verbalized understanding  I was transferred to internal #  Spoke w/ Raymond Ramos and advised of the below and above  She was able to assist me  I was transferred to another dept [phone]  Spoke w/ Anahi Abbasi and advised of all of the below and above  States that fastest way is to initiate new PA  He will fax the PA form to [phone] and [phone]  Need to state buy and bill or specialty pharmacy  Once completed, requesting be faxed to [phone]       Awaiting form

## 2022-05-16 NOTE — TELEPHONE ENCOUNTER
New PA form completed  Placed in clerical bin to be scanned  PA form, cover sheet w/ info-Nurtec can cause reflux  Vyepti as nurtec is contraindicated with her level of GERD and Aimovig would be contraindicated with her adverse reaction with Emgality documented and copy of office notes faxed to [phone]  Marked as urgent       Awaiting determination

## 2022-05-18 ENCOUNTER — TELEPHONE (OUTPATIENT)
Dept: NEUROLOGY | Facility: CLINIC | Age: 47
End: 2022-05-18

## 2022-05-18 DIAGNOSIS — G43.109 MIGRAINE WITH AURA AND WITHOUT STATUS MIGRAINOSUS, NOT INTRACTABLE: Primary | ICD-10-CM

## 2022-05-18 RX ORDER — SODIUM CHLORIDE 9 MG/ML
20 INJECTION, SOLUTION INTRAVENOUS ONCE
Status: CANCELLED | OUTPATIENT
Start: 2022-05-24

## 2022-05-18 NOTE — TELEPHONE ENCOUNTER
Received fax from [facility] pharmacy  Fioricet PA is required  rxbin [id number]  pcn ADV  Group [id number]  Id [id number]  [phone]    [facility] aid pharmacy, spoke w/Patti and advised to process fioricet 5 tabs per 30 days  She tried to process it 5 tabs per 30 days but it still requiring PA       Will initiate

## 2022-05-18 NOTE — TELEPHONE ENCOUNTER
Fawn Bangura approved from 5/17/22-11/17/22    Left detailed messag for pt making her aware of approval and to call office back so that we can schedule infusion        vyepti orders entered, pleas sign off

## 2022-05-19 NOTE — TELEPHONE ENCOUNTER
PA initiated on [facility]   Key: [id number]    Awaiting determination

## 2022-05-20 NOTE — TELEPHONE ENCOUNTER
Called pt    Conference call to [facility]  Along with pt and scheduled pt for 5/25 at 1pm

## 2022-05-25 ENCOUNTER — HOSPITAL ENCOUNTER (OUTPATIENT)
Dept: INFUSION CENTER | Facility: HOSPITAL | Age: 47
Discharge: HOME | End: 2022-05-25
Attending: PSYCHIATRY & NEUROLOGY
Payer: MEDICARE

## 2022-05-25 VITALS
TEMPERATURE: 97.1 F | RESPIRATION RATE: 18 BRPM | HEART RATE: 58 BPM | DIASTOLIC BLOOD PRESSURE: 77 MMHG | SYSTOLIC BLOOD PRESSURE: 120 MMHG

## 2022-05-25 DIAGNOSIS — G43.109 MIGRAINE WITH AURA AND WITHOUT STATUS MIGRAINOSUS, NOT INTRACTABLE: Primary | ICD-10-CM

## 2022-05-25 PROCEDURE — 96413 CHEMO IV INFUSION 1 HR: CPT

## 2022-05-25 RX ORDER — SODIUM CHLORIDE 9 MG/ML
20 INJECTION, SOLUTION INTRAVENOUS ONCE
OUTPATIENT
Start: 2022-08-17

## 2022-05-25 RX ORDER — SODIUM CHLORIDE 9 MG/ML
20 INJECTION, SOLUTION INTRAVENOUS ONCE
Status: COMPLETED | OUTPATIENT
Start: 2022-05-25 | End: 2022-05-25

## 2022-05-25 RX ADMIN — SODIUM CHLORIDE 20 ML/HR: 0.9 INJECTION, SOLUTION INTRAVENOUS at 14:15

## 2022-05-25 RX ADMIN — EPTINEZUMAB-JJMR 100 MG: 100 INJECTION INTRAVENOUS at 14:16

## 2022-05-25 NOTE — PLAN OF CARE
Problem: INFECTION - ADULT  Goal: Absence or prevention of progression during hospitalization  Description: INTERVENTIONS:  - Assess and monitor for signs and symptoms of infection  - Monitor lab/diagnostic results  - Monitor all insertion sites, i e  indwelling lines, tubes, and drains  - Monitor endotracheal if appropriate and nasal secretions for changes in amount and color  - Dexter appropriate cooling/warming therapies per order  - Administer medications as ordered  - Instruct and encourage patient and family to use good hand hygiene technique  - Identify and instruct in appropriate isolation precautions for identified infection/condition  Outcome: Progressing     Problem: Knowledge Deficit  Goal: Patient/family/caregiver demonstrates understanding of disease process, treatment plan, medications, and discharge instructions  Description: Complete learning assessment and assess knowledge base    Interventions:  - Provide teaching at level of understanding  - Provide teaching via preferred learning methods  Outcome: Progressing

## 2022-06-03 ENCOUNTER — TELEPHONE (OUTPATIENT)
Dept: GASTROENTEROLOGY | Facility: MEDICAL CENTER | Age: 47
End: 2022-06-03

## 2022-06-03 NOTE — TELEPHONE ENCOUNTER
Kendleton Morning still having GERD issues  Wanting to know if she can be seen earlier than 09/23/22

## 2022-06-15 ENCOUNTER — OFFICE VISIT (OUTPATIENT)
Dept: PLASTIC SURGERY | Facility: CLINIC | Age: 47
End: 2022-06-15

## 2022-06-15 DIAGNOSIS — Z98.890 S/P BILATERAL BREAST REDUCTION: Primary | ICD-10-CM

## 2022-06-15 PROCEDURE — 99024 POSTOP FOLLOW-UP VISIT: CPT | Performed by: PHYSICIAN ASSISTANT

## 2022-06-15 NOTE — PROGRESS NOTES
Assessment/Plan:     Patient is a 39-year-old female who is status post bilateral breast reduction with Dr Alisson Langston on 03/25/2022Joleen Drivers see HPI  Patient presents to the office for routine postoperative visit  She reports that she is extremely pleased with her results  After " pictures taken today, they are available in [employer]  The patient will return to the office in approximately 3 months for a postoperative check  She was advised to call the office if any questions or concerns arise prior to her next scheduled appointment  Diagnoses and all orders for this visit:    S/P bilateral breast reduction          Subjective:     Patient ID: David Hand is a 51 y o  female  HPI   Patient states that she is been doing very well  She is very pleased that she went ahead with surgery  Review of Systems    See HPI  Objective:     Physical Exam      Bilateral incisions are completely healed  Breasts are soft, supple and grossly symmetric

## 2022-06-16 ENCOUNTER — TELEPHONE (OUTPATIENT)
Dept: GASTROENTEROLOGY | Facility: MEDICAL CENTER | Age: 47
End: 2022-06-16

## 2022-06-17 ENCOUNTER — OFFICE VISIT (OUTPATIENT)
Dept: GASTROENTEROLOGY | Facility: MEDICAL CENTER | Age: 47
End: 2022-06-17
Payer: MEDICARE

## 2022-06-17 VITALS
TEMPERATURE: 99 F | DIASTOLIC BLOOD PRESSURE: 70 MMHG | HEART RATE: 64 BPM | SYSTOLIC BLOOD PRESSURE: 121 MMHG | BODY MASS INDEX: 25.51 KG/M2 | WEIGHT: 144 LBS

## 2022-06-17 DIAGNOSIS — Z98.84 BARIATRIC SURGERY STATUS: ICD-10-CM

## 2022-06-17 DIAGNOSIS — K21.9 GASTROESOPHAGEAL REFLUX DISEASE WITHOUT ESOPHAGITIS: ICD-10-CM

## 2022-06-17 DIAGNOSIS — Z80.0 FAMILY HISTORY OF MALIGNANT NEOPLASM OF GASTROINTESTINAL TRACT: ICD-10-CM

## 2022-06-17 DIAGNOSIS — Z12.11 COLON CANCER SCREENING: Primary | ICD-10-CM

## 2022-06-17 PROCEDURE — 99214 OFFICE O/P EST MOD 30 MIN: CPT | Performed by: INTERNAL MEDICINE

## 2022-06-17 RX ORDER — FAMOTIDINE 40 MG/1
40 TABLET, FILM COATED ORAL DAILY
Qty: 30 TABLET | Refills: 2 | Status: SHIPPED | OUTPATIENT
Start: 2022-06-17 | End: 2022-07-17

## 2022-06-17 NOTE — PROGRESS NOTES
Outpatient Follow up  William Buckley  [address]  [address]  [facility] [address]  Alicia Mann MD  Ph : [phone]  Fax : [phone]  Mobile : [phone]  Email : [email or web address]  Also available on Tiger Text    Britt Lafleur 51 y o  female MRN: [id number]    PCP: Betito Patel DO  Referring: No referring provider defined for this encounter  Britt Lafleur presented for a follow up visit  My recommendations are included  Please do not hesitate to contact me with any questions you may have  ASSESSMENT AND PLAN:      No problem-specific Assessment & Plan notes found for this encounter  Diagnoses and all orders for this visit:    Colon cancer screening  -     Cologuard    Family history of malignant neoplasm of gastrointestinal tract    Bariatric surgery status  -     Basic metabolic panel; Future  -     Vitamin D 25 hydroxy; Future    Gastroesophageal reflux disease without esophagitis      51year-old status post gastric sleeve with resultant leak which was successfully sealed using a padlock clip  She is doing well however having significant heartburn for which she takes omeprazole twice a day and it does help her symptoms somewhat  Certain foods make the symptoms worse  She has never had a colonoscopy done  I would recommend the following  1  There are no endoscopic or surgical options for her reflux at this time  Since her symptoms are somewhat controlled on PPI therapy I would recommend to continue that for the time being along with antacids as needed such as Carafate, Tums, Maalox, etc  Recommend dietary control to avoid trigger foods  2  We also discussed regarding the long-term effects of PPI therapy  We would like to taper down to the lowest dose possible  I discussed the long-term effects of PPI therapy with her  We would like to check a creatinine intermittently and also check a vitamin-D level    I recommended for her to take multivitamins  3  She has never had colon cancer screening  I would recommend doing a colonoscopy however she is hesitant to do that  We then discussed doing Cologuard which she is agreeable with   ______________________________________________________________________    SUBJECTIVE:   Josi Leahy is a 79-year-old lady with history gastric sleeve with a leak which was sealed using a padlock clip  She had a complicated course in the hospital   She is doing well at this time  She has been having ongoing heartburn  Even plain water gives her heartburn  Certain foods including peanut butter and sauces make the pain worse  She takes omeprazole twice a day  She did have an endoscopy done recently with a Bravo study  This showed a high DeMeester score with increased acid exposure time  On endoscopy no esophagitis was seen  There was evidence of prior sleeve gastrectomy and the padlock clip was also seen in the proximal gastric body  The duodenum was normal   She is active  No significant weight loss at this time  She has never had a colonoscopy done  REVIEW OF SYSTEMS IS OTHERWISE NEGATIVE  Historical Information   Past Medical History:   Diagnosis Date    Abdominal pain     Acute cystitis with hematuria 1/3/2020    Brain condition     Pseudotumor Cerebri     Chronic kidney disease     De Quervain's disease (tenosynovitis)     Esophageal perforation     Gastric leak     GERD (gastroesophageal reflux disease)     Headache     Idiopathic intracranial hypertension     Kidney stone     Migraine     No blood products     per pt: personal and [employer] beliefs   surgeon office aware 12/13/19    Papilledema, both eyes     PONV (postoperative nausea and vomiting)     Postgastrectomy malabsorption     Presence of lumboperitoneal shunt     Resolved: Sep 20, 2017    Rotator cuff tendinitis     Resolved: Aug 23, 2017    Visual field defect      Past Surgical History: Procedure Laterality Date    BREAST BIOPSY Left 1993    benign    CSF SHUNT      LP shunt - 2015 -  shunt - 2017    ESOPHAGOGASTRODUODENOSCOPY N/A 2/23/2018    Procedure: ESOPHAGOGASTRODUODENOSCOPY (EGD) WITH ESOPHAGEAL STENT PLACEMENT;  Surgeon: Ashley Irwin MD;  [facility];  Service: Thoracic    ESOPHAGOGASTRODUODENOSCOPY N/A 2/23/2018    Procedure: ESOPHAGOGASTRODUODENOSCOPY (EGD) WITH REMOVAL ESOPHAGEAL STENT  AND REPLACEMENT WITH 23mm X155mm [address];  Surgeon: Ashley Irwin MD;  [facility];  Service: Thoracic    ESOPHAGOGASTRODUODENOSCOPY N/A 3/28/2018    Procedure: ESOPHAGOGASTRODUODENOSCOPY (EGD) with PEJ placement ;  Surgeon: Samantha Orlando MD;  [facility]; Service: Gastroenterology    ESOPHAGOGASTRODUODENOSCOPY N/A 4/5/2018    Procedure: ESOPHAGOGASTRODUODENOSCOPY (EGD) with botox injection and kaofed placement;  Surgeon: Mariaelena Ha DO;  [facility]; Service: Gastroenterology    ESOPHAGOGASTRODUODENOSCOPY N/A 4/10/2018    Procedure: ESOPHAGOGASTRODUODENOSCOPY (EGD) with Kaofed placement;  Surgeon: Wesly Pelaez MD;  [facility]; Service: Gastroenterology    ESOPHAGOSCOPY WITH STENT INSERTION N/A 1/24/2018    Procedure: INSERTION STENT ESOPHAGEAL;  Surgeon: Stevan Bardales MD;  [facility]; Service: Gastroenterology    EYE SURGERY Bilateral 1980    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)     FL RETROGRADE PYELOGRAM  6/24/2020    FL RETROGRADE PYELOGRAM  8/21/2021    GASTRIC BYPASS  12/19/2016    Lap sleeve gastrectomy w/shunt length shortening procedure    HIATAL HERNIA REPAIR      HYSTERECTOMY  03/14/2013    IR LUMBAR PUNCTURE  8/29/2018    LAPAROTOMY N/A 1/25/2018    Procedure: Exploratory Laparotomy, wash out,placement of drains, placement of NG feeding tube ;   Surgeon: Humberto Robins DO;  [facility];  Service: General    LUMBAR PERITONEAL SHUNT  11/19/2015    Laparoscopic assisted    DC BREAST REDUCTION Bilateral 3/25/2022    Procedure: BILATERAL BREAST REDUCTION;  Surgeon: Ameena August MD;  [facility];  Service: Plastics    DC CREATE SHUNT:VENTRIC-ATRIAL Right 12/18/2019    Procedure: IMAGE GUIDED INSERTION OF RIGHT CORONAL VENTRICULAR-ATRIAL SHUNT;  Surgeon: Kristine Webster MD;  [facility];  Service: Neurosurgery    DC CREATE SHUNT:VENTRIC-PERITONEAL Right 5/31/2017    Procedure: IMAGE GUIDED CORONAL PLACEMENT OF PROGRAMABLE VENTRICULAR-PERITONEAL SHUNT, REMOVAL OF LP SHUNT ;  Surgeon: Kristine Webster MD;  [facility];  Service: Neurosurgery    DC CYSTO/URETERO W/LITHOTRIPSY &INDWELL STENT INSRT Bilateral 6/24/2020    Procedure: CYSTOSCOPY URETEROSCOPY WITH LITHOTRIPSY HOLMIUM LASER, RETROGRADE PYELOGRAM AND exchange bilateral  STENTs URETERAL;  Surgeon: Ute Quiroga MD;  [facility];  Service: Urology    DC CYSTO/URETERO W/LITHOTRIPSY &INDWELL STENT INSRT Left 8/21/2021    Procedure: CYSTOSCOPY; LEFT URETEROSCOPY WITH RETROGRADE PYELOGRAM, REMOVAL OF STONE AND INSERTION LEFT URETERAL STENT;  Surgeon: Ritesh Guerrero MD;  [facility];  Service: Urology    DC CYSTOURETHROSCOPY,URETER CATHETER N/A 6/6/2020    Procedure: Bilateral CYSTOSCOPY RETROGRADE PYELOGRAM WITH INSERTION STENT URETERAL;  Surgeon: Sybil Mcgarry MD;  Location: [address] MAIN OR;  Service: Urology    DC EGD TRANSORAL BIOPSY SINGLE/MULTIPLE N/A 6/27/2018    Procedure: ESOPHAGOGASTRODUODENOSCOPY (EGD) with padlock clip placement;  Surgeon: Snow Moreira MD;  [facility];   Service: Escobar Peeling CSF SHUNT,W/O REPLACE Right 2/5/2018    Procedure: Removal of  shunt;  Surgeon: Kristine Webster MD;  [facility];  Service: Neurosurgery    DC REPLACEMENT/REVISION,CSF SHUNT Right 1/25/2018    Procedure: Externalization of right-sided SHUNT VENTRICULAR-PERITONEAL in anterior chest wall ribs two and three level  ;  Surgeon: Alva Moore MD;  [facility];  Service: Neurosurgery    WRIST SURGERY Right     x3 ,      Social History   Social History     Substance and Sexual Activity   Alcohol Use Never     Social History     Substance and Sexual Activity   Drug Use Yes    Frequency: 7 0 times per week    Types: Marijuana    Comment: medical marijuana, vaping & topical      Social History     Tobacco Use   Smoking Status Former Smoker    Packs/day: 0 50    Years: 20 00    Pack years: 10 00    Types: Cigarettes    Quit date: 2012    Years since quittin 8   Smokeless Tobacco Never Used     Family History   Problem Relation Age of Onset    No Known Problems Mother     No Known Problems Father     Thyroid cancer Brother 36    Colon cancer Maternal Grandfather 44    No Known Problems Paternal Grandmother     Cancer Paternal Grandfather     Cancer Family         Gastric    Leukemia Family     Colon cancer Family     Pancreatic cancer Family     No Known Problems Sister     No Known Problems Daughter     No Known Problems Maternal Grandmother     No Known Problems Son     No Known Problems Maternal Aunt     No Known Problems Maternal Aunt     No Known Problems Paternal Aunt        Meds/Allergies       Current Outpatient Medications:     acetaminophen (TYLENOL) 500 mg tablet    ascorbic acid (VITAMIN C) 250 MG CHEW    Biotin 1 MG CAPS    calcium citrate-vitamin D (CITRACAL+D) 315-200 MG-UNIT per tablet    docusate sodium (COLACE) 100 mg capsule    ferrous gluconate 324 (40 4 Fe) mg    folic acid (FOLVITE) 1 mg tablet    Multiple Vitamin (MULTIVITAMIN) tablet    Multiple Vitamins-Minerals (Hair/Skin/Nails) CAPS    NON FORMULARY    omeprazole (PriLOSEC) 40 MG capsule    ondansetron (ZOFRAN) 4 mg tablet    vitamin B-12 (VITAMIN B-12) 500 mcg tablet    esomeprazole (NexIUM) 40 MG capsule    gabapentin (Neurontin) 300 mg capsule    Allergies   Allergen Reactions    Benadryl [Diphenhydramine] Anaphylaxis     Throat closing    Phenergan [Promethazine] Anaphylaxis           Objective     Blood pressure 121/70, pulse 64, temperature 99 °F (37 2 °C), temperature source Probe, weight 65 3 kg (144 lb), not currently breastfeeding  Body mass index is 25 51 kg/m²  PHYSICAL EXAM:      Physical Exam  Constitutional:       Appearance: Normal appearance  She is well-developed  HENT:      Head: Normocephalic and atraumatic  Eyes:      General: No scleral icterus  Conjunctiva/sclera: Conjunctivae normal       Pupils: Pupils are equal, round, and reactive to light  Cardiovascular:      Rate and Rhythm: Normal rate and regular rhythm  Heart sounds: Normal heart sounds  Pulmonary:      Effort: Pulmonary effort is normal  No respiratory distress  Breath sounds: Normal breath sounds  Abdominal:      General: Bowel sounds are normal  There is no distension  Palpations: Abdomen is soft  There is no mass  Tenderness: There is no abdominal tenderness  Hernia: No hernia is present  Musculoskeletal:         General: Normal range of motion  Cervical back: Normal range of motion  Lymphadenopathy:      Cervical: No cervical adenopathy  Skin:     General: Skin is warm  Neurological:      Mental Status: She is alert and oriented to person, place, and time  Psychiatric:         Behavior: Behavior normal          Thought Content: Thought content normal          Lab Results:   No visits with results within 1 Day(s) from this visit     Latest known visit with results is:   Admission on 03/25/2022, Discharged on 03/25/2022   Component Date Value    Case Report 03/25/2022                      Value:Surgical Pathology Report                         Case: [id number]                                   Authorizing Provider:  Blanca Tidwell MD         Collected:           03/25/2022 1134              Ordering Location:     [address] [facility]      Received: 03/25/2022 [address] [place] [address] Operating Room                                                      Pathologist:           Prema Dailey MD                                                                Specimens:   A) - Breast, Right, breast tissue right                                                             B) - Breast, Left, left breast tissue                                                      Final Diagnosis 03/25/2022                      Value: This result contains rich text formatting which cannot be displayed here   Note 03/25/2022                      Value: This result contains rich text formatting which cannot be displayed here   Additional Information 03/25/2022                      Value: This result contains rich text formatting which cannot be displayed here  Preet Nuñez Description 03/25/2022                      Value: This result contains rich text formatting which cannot be displayed here  Radiology Results:   No results found

## 2022-06-21 ENCOUNTER — TELEPHONE (OUTPATIENT)
Dept: ADMINISTRATIVE | Facility: OTHER | Age: 47
End: 2022-06-21

## 2022-06-21 DIAGNOSIS — R19.5 POSITIVE COLORECTAL CANCER SCREENING USING COLOGUARD TEST: Primary | ICD-10-CM

## 2022-06-21 NOTE — TELEPHONE ENCOUNTER
06/21/22 12:40 PM    The patient was called and reminded about the open Cologuard order  Instructed to call the PCP office if there are any questions about completing the CRC screen  Stated she did not receive the Cologaurd kit to submit sample in; order date 10-  Thank you    Doretha Baez MA  [facility]

## 2022-06-30 ENCOUNTER — TELEPHONE (OUTPATIENT)
Dept: HEMATOLOGY ONCOLOGY | Facility: CLINIC | Age: 47
End: 2022-06-30

## 2022-07-06 DIAGNOSIS — K21.9 GASTROESOPHAGEAL REFLUX DISEASE, UNSPECIFIED WHETHER ESOPHAGITIS PRESENT: ICD-10-CM

## 2022-07-06 RX ORDER — OMEPRAZOLE 40 MG/1
CAPSULE, DELAYED RELEASE ORAL
Qty: 60 CAPSULE | Refills: 2 | Status: SHIPPED | OUTPATIENT
Start: 2022-07-06 | End: 2022-09-23 | Stop reason: SDUPTHER

## 2022-07-27 ENCOUNTER — TELEPHONE (OUTPATIENT)
Dept: HEMATOLOGY ONCOLOGY | Facility: CLINIC | Age: 47
End: 2022-07-27

## 2022-07-27 NOTE — TELEPHONE ENCOUNTER
Pt called back and will try to get her lab work done Thursday or Friday before her visit with Jonh Osman

## 2022-07-27 NOTE — TELEPHONE ENCOUNTER
LVM to the patient in regards to her lab work that needs to be completed prior to her appt on 8/2/22 at 2:30pm

## 2022-07-30 ENCOUNTER — APPOINTMENT (OUTPATIENT)
Dept: LAB | Facility: HOSPITAL | Age: 47
End: 2022-07-30
Payer: MEDICARE

## 2022-07-30 DIAGNOSIS — E53.8 B12 DEFICIENCY: ICD-10-CM

## 2022-07-30 DIAGNOSIS — Z98.84 BARIATRIC SURGERY STATUS: ICD-10-CM

## 2022-07-30 DIAGNOSIS — E53.8 FOLIC ACID DEFICIENCY: ICD-10-CM

## 2022-07-30 DIAGNOSIS — D50.8 IRON DEFICIENCY ANEMIA SECONDARY TO INADEQUATE DIETARY IRON INTAKE: ICD-10-CM

## 2022-07-30 DIAGNOSIS — D75.1 ERYTHROCYTOSIS: ICD-10-CM

## 2022-07-30 LAB
25(OH)D3 SERPL-MCNC: 78 NG/ML (ref 30–100)
ALBUMIN SERPL BCP-MCNC: 3.9 G/DL (ref 3.5–5)
ALP SERPL-CCNC: 54 U/L (ref 46–116)
ALT SERPL W P-5'-P-CCNC: 17 U/L (ref 12–78)
ANION GAP SERPL CALCULATED.3IONS-SCNC: 2 MMOL/L (ref 4–13)
AST SERPL W P-5'-P-CCNC: 12 U/L (ref 5–45)
BASOPHILS # BLD AUTO: 0.04 THOUSANDS/ΜL (ref 0–0.1)
BASOPHILS NFR BLD AUTO: 1 % (ref 0–1)
BILIRUB SERPL-MCNC: 0.52 MG/DL (ref 0.2–1)
BUN SERPL-MCNC: 19 MG/DL (ref 5–25)
CALCIUM SERPL-MCNC: 9.5 MG/DL (ref 8.3–10.1)
CHLORIDE SERPL-SCNC: 109 MMOL/L (ref 96–108)
CO2 SERPL-SCNC: 30 MMOL/L (ref 21–32)
CREAT SERPL-MCNC: 0.75 MG/DL (ref 0.6–1.3)
CRP SERPL QL: <3 MG/L
EOSINOPHIL # BLD AUTO: 0.16 THOUSAND/ΜL (ref 0–0.61)
EOSINOPHIL NFR BLD AUTO: 3 % (ref 0–6)
ERYTHROCYTE [DISTWIDTH] IN BLOOD BY AUTOMATED COUNT: 12.9 % (ref 11.6–15.1)
ERYTHROCYTE [SEDIMENTATION RATE] IN BLOOD: 3 MM/HOUR (ref 0–19)
FERRITIN SERPL-MCNC: 15 NG/ML (ref 8–388)
FOLATE SERPL-MCNC: 15.5 NG/ML (ref 3.1–17.5)
GAS + CO PNL BLDA: 2 % (ref 0–1.5)
GFR SERPL CREATININE-BSD FRML MDRD: 95 ML/MIN/1.73SQ M
GLUCOSE P FAST SERPL-MCNC: 85 MG/DL (ref 65–99)
HCT VFR BLD AUTO: 46.1 % (ref 34.8–46.1)
HGB BLD-MCNC: 15.2 G/DL (ref 11.5–15.4)
IMM GRANULOCYTES # BLD AUTO: 0.02 THOUSAND/UL (ref 0–0.2)
IMM GRANULOCYTES NFR BLD AUTO: 0 % (ref 0–2)
IRON SATN MFR SERPL: 27 % (ref 15–50)
IRON SERPL-MCNC: 97 UG/DL (ref 50–170)
LDH SERPL-CCNC: 147 U/L (ref 81–234)
LYMPHOCYTES # BLD AUTO: 1.13 THOUSANDS/ΜL (ref 0.6–4.47)
LYMPHOCYTES NFR BLD AUTO: 20 % (ref 14–44)
MCH RBC QN AUTO: 29.3 PG (ref 26.8–34.3)
MCHC RBC AUTO-ENTMCNC: 33 G/DL (ref 31.4–37.4)
MCV RBC AUTO: 89 FL (ref 82–98)
MONOCYTES # BLD AUTO: 0.32 THOUSAND/ΜL (ref 0.17–1.22)
MONOCYTES NFR BLD AUTO: 6 % (ref 4–12)
NEUTROPHILS # BLD AUTO: 3.87 THOUSANDS/ΜL (ref 1.85–7.62)
NEUTS SEG NFR BLD AUTO: 70 % (ref 43–75)
NRBC BLD AUTO-RTO: 0 /100 WBCS
PLATELET # BLD AUTO: 183 THOUSANDS/UL (ref 149–390)
PMV BLD AUTO: 9.6 FL (ref 8.9–12.7)
POTASSIUM SERPL-SCNC: 3.6 MMOL/L (ref 3.5–5.3)
PROT SERPL-MCNC: 7.2 G/DL (ref 6.4–8.4)
RBC # BLD AUTO: 5.19 MILLION/UL (ref 3.81–5.12)
SODIUM SERPL-SCNC: 141 MMOL/L (ref 135–147)
TIBC SERPL-MCNC: 360 UG/DL (ref 250–450)
VIT B12 SERPL-MCNC: 456 PG/ML (ref 100–900)
WBC # BLD AUTO: 5.54 THOUSAND/UL (ref 4.31–10.16)

## 2022-07-30 PROCEDURE — 82746 ASSAY OF FOLIC ACID SERUM: CPT

## 2022-07-30 PROCEDURE — 82607 VITAMIN B-12: CPT

## 2022-07-30 PROCEDURE — 36415 COLL VENOUS BLD VENIPUNCTURE: CPT

## 2022-07-30 PROCEDURE — 80053 COMPREHEN METABOLIC PANEL: CPT

## 2022-07-30 PROCEDURE — 85652 RBC SED RATE AUTOMATED: CPT

## 2022-07-30 PROCEDURE — 83550 IRON BINDING TEST: CPT

## 2022-07-30 PROCEDURE — 82375 ASSAY CARBOXYHB QUANT: CPT

## 2022-07-30 PROCEDURE — 82728 ASSAY OF FERRITIN: CPT

## 2022-07-30 PROCEDURE — 85025 COMPLETE CBC W/AUTO DIFF WBC: CPT

## 2022-07-30 PROCEDURE — 82668 ASSAY OF ERYTHROPOIETIN: CPT

## 2022-07-30 PROCEDURE — 86140 C-REACTIVE PROTEIN: CPT

## 2022-07-30 PROCEDURE — 83615 LACTATE (LD) (LDH) ENZYME: CPT

## 2022-07-30 PROCEDURE — 82306 VITAMIN D 25 HYDROXY: CPT

## 2022-07-30 PROCEDURE — 83540 ASSAY OF IRON: CPT

## 2022-08-02 ENCOUNTER — OFFICE VISIT (OUTPATIENT)
Dept: HEMATOLOGY ONCOLOGY | Facility: CLINIC | Age: 47
End: 2022-08-02
Payer: MEDICARE

## 2022-08-02 VITALS
RESPIRATION RATE: 18 BRPM | DIASTOLIC BLOOD PRESSURE: 74 MMHG | WEIGHT: 137 LBS | HEART RATE: 60 BPM | SYSTOLIC BLOOD PRESSURE: 118 MMHG | HEIGHT: 63 IN | OXYGEN SATURATION: 99 % | TEMPERATURE: 97.2 F | BODY MASS INDEX: 24.27 KG/M2

## 2022-08-02 DIAGNOSIS — D50.8 IRON DEFICIENCY ANEMIA SECONDARY TO INADEQUATE DIETARY IRON INTAKE: Primary | ICD-10-CM

## 2022-08-02 DIAGNOSIS — E53.8 FOLIC ACID DEFICIENCY: ICD-10-CM

## 2022-08-02 DIAGNOSIS — E53.8 B12 DEFICIENCY: ICD-10-CM

## 2022-08-02 LAB — EPO SERPL-ACNC: 7.8 MIU/ML (ref 2.6–18.5)

## 2022-08-02 PROCEDURE — 99214 OFFICE O/P EST MOD 30 MIN: CPT | Performed by: NURSE PRACTITIONER

## 2022-08-02 RX ORDER — SODIUM CHLORIDE 9 MG/ML
20 INJECTION, SOLUTION INTRAVENOUS ONCE
Status: CANCELLED | OUTPATIENT
Start: 2022-08-10

## 2022-08-02 NOTE — PROGRESS NOTES
Hematology/Oncology Outpatient Follow-up  Maricarmen Garza 51 y o  female 1975 [id number]    Date:  8/2/2022      Assessment and Plan:  1  Iron deficiency anemia secondary to inadequate dietary iron intake  Patient is not currently anemic however she seems to be iron deficient again ferritin 15 and symptomatic with worsening fatigue  She has been taking oral iron supplements which did not seem to be working and is unlikely absorbing them  Etiology of her iron deficiency is likely malabsorption from her her prior bariatric/GI surgery  She continues to also have significant reflux and is on chronic PPI; was seen by her gastroenterology team recently  Was offered another course of IV iron Venofer 200 mg weekly for 4 sessions total with her usual premedications which she agreed to  Request she follow up again with repeat labs in about 4 months for close monitoring or sooner should the need arise     - CBC and differential; Future  - Comprehensive metabolic panel; Future  - C-reactive protein; Future  - Sedimentation rate, automated; Future  - Vitamin B12; Future  - Iron Panel (Includes Ferritin, Iron Sat%, Iron, and TIBC); Future  - Ferritin; Future  - Folate; Future  - Carboxyhemoglobin; Future    2  B12 deficiency  She will continue her vitamin B12 supplement daily which is maintaining her levels greater than 400     - Vitamin B12; Future    3  Folic acid deficiency  Patient will continue her folic acid supplement on a daily basis which is maintaining her levels appropriately  - Folate;  Future      HPI:  Patient is a pleasant 26-year-old female with complicated past medical history who presented originally for further evaluation and treatment of her microcytic anemia/iron deficiency   She had gastric sleeve surgery 2016 followed by laparoscopic paraesophageal hernia repair XX 8904 which later resulted in GE junction leak/fistula requiring open repair and lead to sepsis/inpatient hospitalization for 5 months   She also was diagnosed with pseudotumor cerebri in 2015 had  shunt placed which had to be removed with her sepsis and later had [facility] shunt placed   She has  PTSD as a result of her above-mentioned events, kidney stones and migraines  Kiara Ham does have a neurologist and gastroenterologist who is monitoring her       Her laboratory studies from 08/22/2021 showed microcytic hypochromic anemia H&H 8 6/30, MCV 77, MCH 22 1 her platelet count and white cells or normal   BMP normal   She had significant iron deficiency iron saturation 5%, TIBC 414, serum iron 22 with ferritin 2  Her iron deficiency was corrected with IV iron Venofer x6 treatments November/December 2021  Interval history:  Patient presents today for a follow-up visit  She mentions that she has been feeling more fatigued/exhausted recently  She continues to work and is enjoying pending time in her swimming pool  She also continues to struggle with significant reflux  Was seen by her gastroenterology team recently who was planning to decrease her PPI however she states that she had to go back to using her usual dose  She has lost a few lb since her last office which she believes is due to eating well with the heat and her reflux  She does get vomiting at times  Denies any obvious bleeding from any site  She is taking vitamin G30 and folic acid supplements daily  Has also been taking oral iron supplements  Her most recent laboratory studies from 07/30/2022 showed normal white cells and platelets, H&H is in the high normal range 15 2/46 1, MCV 89  CMP is appropriate  Inflammatory markers are not elevated  EPO is normal 7 8  LDH is normal   Her carboxyhemoglobin level is slightly above normal 2 0%  Folic acid is appropriate 15 3  Vitamin B12 456  She seems to be iron deficient again iron saturation 27% and ferritin 15  ROS: Review of Systems   Constitutional: Positive for fatigue and unexpected weight change   Negative for activity change, appetite change, chills and fever  HENT: Positive for hearing loss  Negative for congestion, mouth sores, nosebleeds, sore throat and trouble swallowing  Eyes: Negative  Respiratory: Negative for cough, chest tightness and shortness of breath  Cardiovascular: Negative for chest pain, palpitations and leg swelling  Gastrointestinal: Positive for vomiting  Negative for abdominal distention, abdominal pain, blood in stool, constipation, diarrhea and nausea  Genitourinary: Positive for frequency  Negative for dysuria and hematuria  Musculoskeletal: Positive for myalgias  Negative for arthralgias, back pain, gait problem and joint swelling  Skin: Negative for color change, pallor and rash  Neurological: Positive for dizziness and headaches  Negative for weakness, light-headedness and numbness  Hematological: Negative for adenopathy  Does not bruise/bleed easily  Psychiatric/Behavioral: Positive for sleep disturbance  Negative for dysphoric mood  The patient is nervous/anxious  Past Medical History:   Diagnosis Date    Abdominal pain     Acute cystitis with hematuria 1/3/2020    Brain condition     Pseudotumor Cerebri     Chronic kidney disease     De Quervain's disease (tenosynovitis)     Esophageal perforation     Gastric leak     GERD (gastroesophageal reflux disease)     Headache     Idiopathic intracranial hypertension     Kidney stone     Migraine     No blood products     per pt: personal and Sikh beliefs   surgeon office aware 12/13/19    Papilledema, both eyes     PONV (postoperative nausea and vomiting)     Postgastrectomy malabsorption     Presence of lumboperitoneal shunt     Resolved: Sep 20, 2017    Rotator cuff tendinitis     Resolved: Aug 23, 2017    Visual field defect        Past Surgical History:   Procedure Laterality Date    BREAST BIOPSY Left 1993    benign    CSF SHUNT      LP shunt - 2015 -  shunt - 2017    ESOPHAGOGASTRODUODENOSCOPY N/A 2/23/2018    Procedure: ESOPHAGOGASTRODUODENOSCOPY (EGD) WITH ESOPHAGEAL STENT PLACEMENT;  Surgeon: Carol Daniel MD;  [facility];  Service: Thoracic    ESOPHAGOGASTRODUODENOSCOPY N/A 2/23/2018    Procedure: ESOPHAGOGASTRODUODENOSCOPY (EGD) WITH REMOVAL ESOPHAGEAL STENT  AND REPLACEMENT WITH 23mm X155mm [address];  Surgeon: Carol Daniel MD;  [facility];  Service: Thoracic    ESOPHAGOGASTRODUODENOSCOPY N/A 3/28/2018    Procedure: ESOPHAGOGASTRODUODENOSCOPY (EGD) with PEJ placement ;  Surgeon: Gatito Brooks MD;  [facility]; Service: Gastroenterology    ESOPHAGOGASTRODUODENOSCOPY N/A 4/5/2018    Procedure: ESOPHAGOGASTRODUODENOSCOPY (EGD) with botox injection and kaofed placement;  Surgeon: Mary Ellen Kearney DO;  [facility]; Service: Gastroenterology    ESOPHAGOGASTRODUODENOSCOPY N/A 4/10/2018    Procedure: ESOPHAGOGASTRODUODENOSCOPY (EGD) with Kaofed placement;  Surgeon: Cleopatra Boyce MD;  [facility]; Service: Gastroenterology    ESOPHAGOSCOPY WITH STENT INSERTION N/A 1/24/2018    Procedure: INSERTION STENT ESOPHAGEAL;  Surgeon: Thais Kelley MD;  [facility]; Service: Gastroenterology    EYE SURGERY Bilateral 1980    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)     FL RETROGRADE PYELOGRAM  6/24/2020    FL RETROGRADE PYELOGRAM  8/21/2021    GASTRIC BYPASS  12/19/2016    Lap sleeve gastrectomy w/shunt length shortening procedure    HIATAL HERNIA REPAIR      HYSTERECTOMY  03/14/2013    IR LUMBAR PUNCTURE  8/29/2018    LAPAROTOMY N/A 1/25/2018    Procedure: Exploratory Laparotomy, wash out,placement of drains, placement of NG feeding tube ;   Surgeon: Justin Barragan DO;  [facility];  Service: General    LUMBAR PERITONEAL SHUNT  11/19/2015    Laparoscopic assisted    DE BREAST REDUCTION Bilateral 3/25/2022    Procedure: BILATERAL BREAST REDUCTION;  Surgeon: Ector Clements MD; [facility];  Service: Plastics    NM CREATE SHUNT:VENTRIC-ATRIAL Right 12/18/2019    Procedure: IMAGE GUIDED INSERTION OF RIGHT CORONAL VENTRICULAR-ATRIAL SHUNT;  Surgeon: Jade Long MD;  [facility];  Service: Neurosurgery    NM CREATE SHUNT:VENTRIC-PERITONEAL Right 5/31/2017    Procedure: IMAGE GUIDED CORONAL PLACEMENT OF PROGRAMABLE VENTRICULAR-PERITONEAL SHUNT, REMOVAL OF LP SHUNT ;  Surgeon: Jade Long MD;  [facility];  Service: Neurosurgery    NM CYSTO/URETERO W/LITHOTRIPSY &INDWELL STENT INSRT Bilateral 6/24/2020    Procedure: CYSTOSCOPY URETEROSCOPY WITH LITHOTRIPSY HOLMIUM LASER, RETROGRADE PYELOGRAM AND exchange bilateral  STENTs URETERAL;  Surgeon: Mabel Escobedo MD;  [facility];  Service: Urology    NM CYSTO/URETERO W/LITHOTRIPSY &INDWELL STENT INSRT Left 8/21/2021    Procedure: CYSTOSCOPY; LEFT URETEROSCOPY WITH RETROGRADE PYELOGRAM, REMOVAL OF STONE AND INSERTION LEFT URETERAL STENT;  Surgeon: Dilip Hodge MD;  [facility];  Service: Urology    NM CYSTOURETHROSCOPY,URETER CATHETER N/A 6/6/2020    Procedure: Bilateral CYSTOSCOPY RETROGRADE PYELOGRAM WITH INSERTION STENT URETERAL;  Surgeon: Robin Curran MD;  Location: [address] MAIN OR;  Service: Urology    NM EGD TRANSORAL BIOPSY SINGLE/MULTIPLE N/A 6/27/2018    Procedure: ESOPHAGOGASTRODUODENOSCOPY (EGD) with padlock clip placement;  Surgeon: Dominick Haynes MD;  [facility];   Service: Theone Staple CSF SHUNT,W/O REPLACE Right 2/5/2018    Procedure: Removal of  shunt;  Surgeon: Jade Long MD;  [facility];  Service: Neurosurgery    NM REPLACEMENT/REVISION,CSF SHUNT Right 1/25/2018    Procedure: Externalization of right-sided SHUNT VENTRICULAR-PERITONEAL in anterior chest wall ribs two and three level  ;  Surgeon: Aracelis Redding MD;  [facility];  Service: Neurosurgery    WRIST SURGERY Right     x3 2006, 2008       Social History Socioeconomic History    Marital status: Single     Spouse name: None    Number of children: 2    Years of education: High School or GED     Highest education level: None   Occupational History    Occupation: employed    Tobacco Use    Smoking status: Former Smoker     Packs/day: 0 50     Years: 20 00     Pack years: 10 00     Types: Cigarettes     Quit date: 2012     Years since quittin 9    Smokeless tobacco: Never Used   Vaping Use    Vaping Use: Every day    Substances: THC, CBD   Substance and Sexual Activity    Alcohol use: Never    Drug use: Yes     Frequency: 7 0 times per week     Types: Marijuana     Comment: medical marijuana, vaping & topical     Sexual activity: Yes   Other Topics Concern    None   Social History Narrative    ** Merged History Encounter **          Social Determinants of Health     Financial Resource Strain: Not on file   Food Insecurity: Not on file   Transportation Needs: Not on file   Physical Activity: Not on file   Stress: Not on file   Social Connections: Not on file   Intimate Partner Violence: Not on file   Housing Stability: Not on file       Family History   Problem Relation Age of Onset    No Known Problems Mother     No Known Problems Father     Thyroid cancer Brother 36    Colon cancer Maternal Grandfather 44    No Known Problems Paternal Grandmother     Cancer Paternal Grandfather     Cancer Family         Gastric    Leukemia Family     Colon cancer Family     Pancreatic cancer Family     No Known Problems Sister     No Known Problems Daughter     No Known Problems Maternal Grandmother     No Known Problems Son     No Known Problems Maternal Aunt     No Known Problems Maternal Aunt     No Known Problems Paternal Aunt        Allergies   Allergen Reactions    Benadryl [Diphenhydramine] Anaphylaxis     Throat closing    Phenergan [Promethazine] Anaphylaxis         Current Outpatient Medications:     acetaminophen (TYLENOL) 500 mg tablet, Take 1 tablet (500 mg total) by mouth every 6 (six) hours as needed for mild pain or moderate pain, Disp: 30 tablet, Rfl: 2    ascorbic acid (VITAMIN C) 250 MG CHEW, Chew 250 mg daily, Disp: , Rfl:     Biotin 1 MG CAPS, Take 1 mg by mouth daily  , Disp: , Rfl:     calcium citrate-vitamin D (CITRACAL+D) 315-200 MG-UNIT per tablet, Take 1 tablet by mouth 2 (two) times a day, Disp: , Rfl:     docusate sodium (COLACE) 100 mg capsule, Take 1 capsule (100 mg total) by mouth 2 (two) times a day (Patient taking differently: Take 100 mg by mouth 2 (two) times a day as needed), Disp: 20 capsule, Rfl: 2    ferrous gluconate 324 (37 5 Fe) mg, Take 324 mg by mouth 2 (two) times a day before meals, Disp: , Rfl:     folic acid (FOLVITE) 1 mg tablet, Take 1 tablet (1 mg total) by mouth daily, Disp: 90 tablet, Rfl: 4    Multiple Vitamin (MULTIVITAMIN) tablet, Take 1 tablet by mouth daily Wears a patch, Disp: , Rfl:     Multiple Vitamins-Minerals (Hair/Skin/Nails) CAPS, Take 1 tablet by mouth 2 (two) times a day  , Disp: , Rfl:     NON FORMULARY, , Disp: , Rfl:     omeprazole (PriLOSEC) 40 MG capsule, take 1 capsule by mouth twice a day, Disp: 60 capsule, Rfl: 2    ondansetron (ZOFRAN) 4 mg tablet, Take 1 tablet (4 mg total) by mouth every 8 (eight) hours as needed for nausea or vomiting, Disp: 20 tablet, Rfl: 0    vitamin B-12 (VITAMIN B-12) 500 mcg tablet, Take 500 mcg by mouth daily, Disp: , Rfl:     esomeprazole (NexIUM) 40 MG capsule, Take 1 capsule (40 mg total) by mouth 2 (two) times a day before meals, Disp: 60 capsule, Rfl: 0    famotidine (PEPCID) 40 MG tablet, Take 1 tablet (40 mg total) by mouth daily, Disp: 30 tablet, Rfl: 2    gabapentin (Neurontin) 300 mg capsule, Take 1 capsule (300 mg total) by mouth 3 (three) times a day for 14 days (Patient not taking: No sig reported), Disp: 42 capsule, Rfl: 0      Physical Exam:  /74 (BP Location: Right arm, Patient Position: Sitting, Cuff Size: Adult)   Pulse 60   Temp (!) 97 2 °F (36 2 °C)   Resp 18   Ht 5' 3" (1 6 m)   Wt 62 1 kg (137 lb)   LMP  (LMP Unknown)   SpO2 99%   BMI 24 27 kg/m²     Physical Exam  Vitals reviewed  Constitutional:       General: She is not in acute distress  Appearance: She is well-developed  She is not diaphoretic  HENT:      Head: Normocephalic and atraumatic  Eyes:      General: No scleral icterus  Conjunctiva/sclera: Conjunctivae normal       Pupils: Pupils are equal, round, and reactive to light  Neck:      Thyroid: No thyromegaly  Cardiovascular:      Rate and Rhythm: Normal rate and regular rhythm  Heart sounds: Normal heart sounds  No murmur heard  Pulmonary:      Effort: Pulmonary effort is normal  No respiratory distress  Breath sounds: Normal breath sounds  Chest:   Breasts:      Right: No axillary adenopathy  Left: No axillary adenopathy  Abdominal:      General: There is no distension  Palpations: Abdomen is soft  There is no hepatomegaly or splenomegaly  Tenderness: There is no abdominal tenderness  Musculoskeletal:         General: No swelling  Normal range of motion  Cervical back: Normal range of motion and neck supple  Lymphadenopathy:      Cervical: No cervical adenopathy  Upper Body:      Right upper body: No axillary adenopathy  Left upper body: No axillary adenopathy  Skin:     General: Skin is warm and dry  Findings: No erythema or rash  Neurological:      General: No focal deficit present  Mental Status: She is alert and oriented to person, place, and time  Psychiatric:         Mood and Affect: Mood is anxious  Affect is blunt  Behavior: Behavior normal  Behavior is cooperative  Thought Content:  Thought content normal          Judgment: Judgment normal            Labs:  Lab Results   Component Value Date    WBC 5 54 07/30/2022    HGB 15 2 07/30/2022    HCT 46 1 07/30/2022    MCV 89 07/30/2022  07/30/2022     Lab Results   Component Value Date     03/22/2018    K 3 6 07/30/2022     (H) 07/30/2022    CO2 30 07/30/2022    ANIONGAP 8 5 03/22/2018    BUN 19 07/30/2022    CREATININE 0 75 07/30/2022    GLUCOSE 94 01/25/2018    GLUF 85 07/30/2022    CALCIUM 9 5 07/30/2022    CORRECTEDCA 9 2 08/21/2021    AST 12 07/30/2022    ALT 17 07/30/2022    ALKPHOS 54 07/30/2022    EGFR 95 07/30/2022       Patient voiced understanding and agreement in the above discussion  Aware to contact our office with questions/symptoms in the interim  This note has been generated by voice recognition software system  Therefore, there may be spelling, grammar, and or syntax errors  Please contact if questions arise

## 2022-08-10 ENCOUNTER — HOSPITAL ENCOUNTER (OUTPATIENT)
Dept: INFUSION CENTER | Facility: HOSPITAL | Age: 47
Discharge: HOME | End: 2022-08-10
Attending: INTERNAL MEDICINE
Payer: MEDICARE

## 2022-08-10 VITALS
SYSTOLIC BLOOD PRESSURE: 112 MMHG | HEART RATE: 52 BPM | DIASTOLIC BLOOD PRESSURE: 64 MMHG | TEMPERATURE: 97.4 F | RESPIRATION RATE: 16 BRPM

## 2022-08-10 DIAGNOSIS — D50.8 IRON DEFICIENCY ANEMIA SECONDARY TO INADEQUATE DIETARY IRON INTAKE: Primary | ICD-10-CM

## 2022-08-10 PROCEDURE — 96365 THER/PROPH/DIAG IV INF INIT: CPT

## 2022-08-10 PROCEDURE — 96375 TX/PRO/DX INJ NEW DRUG ADDON: CPT

## 2022-08-10 RX ORDER — SODIUM CHLORIDE 9 MG/ML
20 INJECTION, SOLUTION INTRAVENOUS ONCE
Status: COMPLETED | OUTPATIENT
Start: 2022-08-10 | End: 2022-08-10

## 2022-08-10 RX ORDER — SODIUM CHLORIDE 9 MG/ML
20 INJECTION, SOLUTION INTRAVENOUS ONCE
Status: CANCELLED | OUTPATIENT
Start: 2022-08-17

## 2022-08-10 RX ADMIN — SODIUM CHLORIDE 200 MG: 9 INJECTION, SOLUTION INTRAVENOUS at 14:24

## 2022-08-10 RX ADMIN — SODIUM CHLORIDE 20 ML/HR: 0.9 INJECTION, SOLUTION INTRAVENOUS at 13:21

## 2022-08-10 RX ADMIN — DEXAMETHASONE SODIUM PHOSPHATE 8 MG: 10 INJECTION, SOLUTION INTRAMUSCULAR; INTRAVENOUS at 13:52

## 2022-08-10 RX ADMIN — FAMOTIDINE 20 MG: 10 INJECTION, SOLUTION INTRAVENOUS at 13:25

## 2022-08-17 ENCOUNTER — HOSPITAL ENCOUNTER (OUTPATIENT)
Dept: INFUSION CENTER | Facility: HOSPITAL | Age: 47
Discharge: HOME | End: 2022-08-17
Attending: INTERNAL MEDICINE
Payer: MEDICARE

## 2022-08-17 ENCOUNTER — HOSPITAL ENCOUNTER (OUTPATIENT)
Dept: INFUSION CENTER | Facility: HOSPITAL | Age: 47
Discharge: HOME | End: 2022-08-17
Attending: PSYCHIATRY & NEUROLOGY
Payer: MEDICARE

## 2022-08-17 VITALS
HEART RATE: 73 BPM | OXYGEN SATURATION: 98 % | DIASTOLIC BLOOD PRESSURE: 60 MMHG | TEMPERATURE: 96.9 F | SYSTOLIC BLOOD PRESSURE: 137 MMHG | RESPIRATION RATE: 16 BRPM

## 2022-08-17 DIAGNOSIS — D50.8 IRON DEFICIENCY ANEMIA SECONDARY TO INADEQUATE DIETARY IRON INTAKE: Primary | ICD-10-CM

## 2022-08-17 DIAGNOSIS — G43.109 MIGRAINE WITH AURA AND WITHOUT STATUS MIGRAINOSUS, NOT INTRACTABLE: Primary | ICD-10-CM

## 2022-08-17 DIAGNOSIS — G43.109 MIGRAINE WITH AURA AND WITHOUT STATUS MIGRAINOSUS, NOT INTRACTABLE: ICD-10-CM

## 2022-08-17 DIAGNOSIS — D50.8 IRON DEFICIENCY ANEMIA SECONDARY TO INADEQUATE DIETARY IRON INTAKE: ICD-10-CM

## 2022-08-17 PROCEDURE — 96365 THER/PROPH/DIAG IV INF INIT: CPT

## 2022-08-17 PROCEDURE — 96367 TX/PROPH/DG ADDL SEQ IV INF: CPT

## 2022-08-17 RX ORDER — SODIUM CHLORIDE 9 MG/ML
20 INJECTION, SOLUTION INTRAVENOUS ONCE
Status: COMPLETED | OUTPATIENT
Start: 2022-08-17 | End: 2022-08-17

## 2022-08-17 RX ORDER — SODIUM CHLORIDE 9 MG/ML
20 INJECTION, SOLUTION INTRAVENOUS ONCE
OUTPATIENT
Start: 2022-11-09

## 2022-08-17 RX ORDER — SODIUM CHLORIDE 9 MG/ML
20 INJECTION, SOLUTION INTRAVENOUS ONCE
Status: CANCELLED | OUTPATIENT
Start: 2022-08-24

## 2022-08-17 RX ADMIN — DEXAMETHASONE SODIUM PHOSPHATE 8 MG: 10 INJECTION, SOLUTION INTRAMUSCULAR; INTRAVENOUS at 12:00

## 2022-08-17 RX ADMIN — IRON SUCROSE 200 MG: 20 INJECTION, SOLUTION INTRAVENOUS at 12:37

## 2022-08-17 RX ADMIN — EPTINEZUMAB-JJMR 100 MG: 100 INJECTION INTRAVENOUS at 13:21

## 2022-08-17 RX ADMIN — FAMOTIDINE 20 MG: 10 INJECTION, SOLUTION INTRAVENOUS at 11:36

## 2022-08-17 RX ADMIN — SODIUM CHLORIDE 20 ML/HR: 0.9 INJECTION, SOLUTION INTRAVENOUS at 11:34

## 2022-08-17 RX ADMIN — SODIUM CHLORIDE 20 ML/HR: 0.9 INJECTION, SOLUTION INTRAVENOUS at 13:20

## 2022-08-17 NOTE — PROGRESS NOTES
Patient tolerated venofer and vyepti infusion well  Did complain of pain and burning at IV site with iron which according to patient occurs each time she has an iron infusion  IV flushed well with good blood return throughout infusion  Patient discharged in stable condition, declined AVS but aware of next appointment

## 2022-08-24 ENCOUNTER — HOSPITAL ENCOUNTER (OUTPATIENT)
Dept: INFUSION CENTER | Facility: HOSPITAL | Age: 47
Discharge: HOME | End: 2022-08-24
Attending: INTERNAL MEDICINE
Payer: MEDICARE

## 2022-08-24 DIAGNOSIS — D50.8 IRON DEFICIENCY ANEMIA SECONDARY TO INADEQUATE DIETARY IRON INTAKE: Primary | ICD-10-CM

## 2022-08-24 PROCEDURE — 96367 TX/PROPH/DG ADDL SEQ IV INF: CPT

## 2022-08-24 PROCEDURE — 96375 TX/PRO/DX INJ NEW DRUG ADDON: CPT

## 2022-08-24 PROCEDURE — 96365 THER/PROPH/DIAG IV INF INIT: CPT

## 2022-08-24 RX ORDER — SODIUM CHLORIDE 9 MG/ML
20 INJECTION, SOLUTION INTRAVENOUS ONCE
Status: COMPLETED | OUTPATIENT
Start: 2022-08-24 | End: 2022-08-24

## 2022-08-24 RX ORDER — SODIUM CHLORIDE 9 MG/ML
20 INJECTION, SOLUTION INTRAVENOUS ONCE
Status: CANCELLED | OUTPATIENT
Start: 2022-08-31

## 2022-08-24 RX ADMIN — SODIUM CHLORIDE 20 ML/HR: 0.9 INJECTION, SOLUTION INTRAVENOUS at 14:26

## 2022-08-24 RX ADMIN — FAMOTIDINE 20 MG: 10 INJECTION, SOLUTION INTRAVENOUS at 14:24

## 2022-08-24 RX ADMIN — IRON SUCROSE 200 MG: 20 INJECTION, SOLUTION INTRAVENOUS at 15:08

## 2022-08-24 RX ADMIN — DEXAMETHASONE SODIUM PHOSPHATE 8 MG: 10 INJECTION, SOLUTION INTRAMUSCULAR; INTRAVENOUS at 14:43

## 2022-08-24 NOTE — PROGRESS NOTES
Pt tolerated todays venofer well  peripheral iv discontinued jelco intact   Discharged ambulatory deferring avs

## 2022-08-24 NOTE — ANESTHESIA POSTPROCEDURE EVALUATION
Post-Op Assessment Note    CV Status:  Stable    Pain management: adequate     Mental Status:  Awake and sleepy (responds appropriately to questions)   Hydration Status:  Euvolemic   PONV Controlled:  Controlled   Airway Patency:  Patent   Post Op Vitals Reviewed: Yes      Staff: Anesthesiologist, CRNA   Comments: VSS,reflexes intact,maintains own airway            /65 (12/18/19 1202)    Temp (!) 97 3 °F (36 3 °C) (12/18/19 1202)    Pulse 59 (12/18/19 1202)   Resp 16 (12/18/19 1202)    SpO2 100 % (12/18/19 1202) Statement Selected

## 2022-08-31 ENCOUNTER — HOSPITAL ENCOUNTER (OUTPATIENT)
Dept: INFUSION CENTER | Facility: HOSPITAL | Age: 47
Discharge: HOME | End: 2022-08-31
Attending: INTERNAL MEDICINE
Payer: MEDICARE

## 2022-08-31 VITALS
RESPIRATION RATE: 18 BRPM | HEART RATE: 69 BPM | OXYGEN SATURATION: 98 % | SYSTOLIC BLOOD PRESSURE: 146 MMHG | DIASTOLIC BLOOD PRESSURE: 81 MMHG | TEMPERATURE: 97.3 F

## 2022-08-31 DIAGNOSIS — D50.8 IRON DEFICIENCY ANEMIA SECONDARY TO INADEQUATE DIETARY IRON INTAKE: Primary | ICD-10-CM

## 2022-08-31 PROCEDURE — 96367 TX/PROPH/DG ADDL SEQ IV INF: CPT

## 2022-08-31 PROCEDURE — 96365 THER/PROPH/DIAG IV INF INIT: CPT

## 2022-08-31 RX ORDER — SODIUM CHLORIDE 9 MG/ML
20 INJECTION, SOLUTION INTRAVENOUS ONCE
Status: COMPLETED | OUTPATIENT
Start: 2022-08-31 | End: 2022-08-31

## 2022-08-31 RX ORDER — SODIUM CHLORIDE 9 MG/ML
20 INJECTION, SOLUTION INTRAVENOUS ONCE
Status: CANCELLED | OUTPATIENT
Start: 2022-08-31

## 2022-08-31 RX ADMIN — DEXAMETHASONE SODIUM PHOSPHATE 8 MG: 10 INJECTION, SOLUTION INTRAMUSCULAR; INTRAVENOUS at 14:36

## 2022-08-31 RX ADMIN — SODIUM CHLORIDE 20 ML/HR: 0.9 INJECTION, SOLUTION INTRAVENOUS at 14:35

## 2022-08-31 RX ADMIN — IRON SUCROSE 200 MG: 20 INJECTION, SOLUTION INTRAVENOUS at 15:14

## 2022-08-31 RX ADMIN — FAMOTIDINE 20 MG: 10 INJECTION, SOLUTION INTRAVENOUS at 14:55

## 2022-08-31 NOTE — PROGRESS NOTES
Pt tolerated todays venofer well  No adverse reaction noted  Peripheral iv discontinued jelco intact   Discharged ambulatory with avs

## 2022-09-02 ENCOUNTER — OFFICE VISIT (OUTPATIENT)
Dept: DERMATOLOGY | Facility: CLINIC | Age: 47
End: 2022-09-02

## 2022-09-02 VITALS — TEMPERATURE: 98 F | WEIGHT: 137 LBS | BODY MASS INDEX: 24.27 KG/M2 | HEIGHT: 63 IN

## 2022-09-02 DIAGNOSIS — L72.0 MILIUM: Primary | ICD-10-CM

## 2022-09-02 NOTE — PATIENT INSTRUCTIONS
SEBORRHEIC KERATOSIS; NON-INFLAMED    Assessment and Plan:  Based on a thorough discussion of this condition and the management approach to it (including a comprehensive discussion of the known risks, side effects and potential benefits of treatment), the patient (family) agrees to implement the following specific plan:    Benign; Reassured  Discussed having the spot on the left shoulder blade removed  Patient is aware that the spot is not covered by insurance and that there may be a possible lab fee on top of it that would get billed separately  For the spot it would cost $250    Seborrheic Keratosis  A seborrheic keratosis is a harmless warty spot that appears during adult life as a common sign of skin aging  Seborrheic keratoses can arise on any area of skin, covered or uncovered, with the usual exception of the palms and soles  They do not arise from mucous membranes  Seborrheic keratoses can have highly variable appearance  Seborrheic keratoses are extremely common  It has been estimated that over 90% of adults over the age of 61 years have one or more of them  They occur in males and females of all races, typically beginning to erupt in the 35s or 45s  They are uncommon under the age of 21 years  The precise cause of seborrhoeic keratoses is not known  Seborrhoeic keratoses are considered degenerative in nature  As time goes by, seborrheic keratoses tend to become more numerous  Some people inherit a tendency to develop a very large number of them; some people may have hundreds of them  The name "seborrheic keratosis" is misleading, because these lesions are not limited to a seborrhoeic distribution (scalp, mid-face, chest, upper back), nor are they formed from sebaceous glands, nor are they associated with sebum -- which is greasy    Seborrheic keratosis may also be called "SK," "Seb K," "basal cell papilloma," "senile wart," or "barnacle "      Researchers have noted:  Eruptive seborrhoeic keratoses can follow sunburn or dermatitis  Skin friction may be the reason they appear in body folds  Viral cause (e g , human papillomavirus) seems unlikely  Stable and clonal mutations or activation of FRFR3, PIK3CA, GENE, AKT1 and EGFR genes are found in seborrhoeic keratoses  Seborrhoeic keratosis can arise from solar lentigo  FRFR3 mutations also arise in solar lentigines  These mutations are associated with increased age and location on the head and neck, suggesting a role of ultraviolet radiation in these lesions  Seborrheic keratoses do not harbour tumour suppressor gene mutations  Epidermal growth factor receptor inhibitors, which are used to treat some cancers, often result in an increase in verrucal (warty) keratoses  There is no easy way to remove multiple lesions on a single occasion  Unless a specific lesion is "inflamed" and is causing pain or stinging/burning or is bleeding, most insurance companies do not authorize treatment  SYRINGOMA      A syringoma is a firm bump that resembles a pimple (papule) on your skin that usually forms in small clusters or groups on your skin, most often on your face

## 2022-09-02 NOTE — PROGRESS NOTES
[facility] Note     Patient Name: Vane Collado  Encounter Date: 9/2/2022    • Have you been cared for by a [facility] in the last 3 years and, if so, which one? No    · Have you traveled outside of the [address] [place] [address] in the past 3 months or outside of the [facility] area in the last 2 weeks? No    • May we call your Preferred Phone number to discuss your specific medical information? Yes    • May we leave a detailed message that includes your specific medical information? Yes      Today's Chief Concerns:  • Concern #1:  Bumps    Past Medical History:  Have you personally ever had or currently have any of the following? · Skin cancer (such as Melanoma, Basal Cell Carcinoma, Squamous Cell Carcinoma? (If Yes, please provide more detail)- No  · Eczema: No  · Psoriasis: No  · HIV/AIDS: No  · Hepatitis B or C: No  · Tuberculosis: No  · Systemic Immunosuppression such as Diabetes, Biologic or Immunotherapy, Chemotherapy, Organ Transplantation, Bone Marrow Transplantation (If YES, please provide more detail): No  · Radiation Treatment (If YES, please provide more detail): No  · Any other major medical conditions/concerns? (If Yes, which types)- No    Family History:  Have any of your "first degree relatives" (parent, brother, sister, or child) had any of the following       · Skin cancer such as Melanoma or Merkel Cell Carcinoma or Pancreatic Cancer? No  · Eczema, Asthma, Hay Fever or Seasonal Allergies: YES, Son asthma  · Psoriasis or Psoriatic Arthritis: No  · Do any other medical conditions seem to run in your family? If Yes, what condition and which relatives?   No    Current Medications:       Current Outpatient Medications:   •  acetaminophen (TYLENOL) 500 mg tablet, Take 1 tablet (500 mg total) by mouth every 6 (six) hours as needed for mild pain or moderate pain, Disp: 30 tablet, Rfl: 2  •  ascorbic acid (VITAMIN C) 250 MG CHEW, Chew 250 mg daily, Disp: , Rfl:   •  Biotin 1 MG CAPS, Take 1 mg by mouth daily  , Disp: , Rfl:   •  calcium citrate-vitamin D (CITRACAL+D) 315-200 MG-UNIT per tablet, Take 1 tablet by mouth 2 (two) times a day, Disp: , Rfl:   •  folic acid (FOLVITE) 1 mg tablet, Take 1 tablet (1 mg total) by mouth daily, Disp: 90 tablet, Rfl: 4  •  Multiple Vitamin (MULTIVITAMIN) tablet, Take 1 tablet by mouth daily Wears a patch, Disp: , Rfl:   •  Multiple Vitamins-Minerals (Hair/Skin/Nails) CAPS, Take 1 tablet by mouth 2 (two) times a day  , Disp: , Rfl:   •  omeprazole (PriLOSEC) 40 MG capsule, take 1 capsule by mouth twice a day, Disp: 60 capsule, Rfl: 2  •  ondansetron (ZOFRAN) 4 mg tablet, Take 1 tablet (4 mg total) by mouth every 8 (eight) hours as needed for nausea or vomiting, Disp: 20 tablet, Rfl: 0  •  vitamin B-12 (VITAMIN B-12) 500 mcg tablet, Take 500 mcg by mouth daily, Disp: , Rfl:   •  docusate sodium (COLACE) 100 mg capsule, Take 1 capsule (100 mg total) by mouth 2 (two) times a day (Patient taking differently: Take 100 mg by mouth 2 (two) times a day as needed), Disp: 20 capsule, Rfl: 2  •  esomeprazole (NexIUM) 40 MG capsule, Take 1 capsule (40 mg total) by mouth 2 (two) times a day before meals, Disp: 60 capsule, Rfl: 0  •  famotidine (PEPCID) 40 MG tablet, Take 1 tablet (40 mg total) by mouth daily, Disp: 30 tablet, Rfl: 2  •  ferrous gluconate 324 (37 5 Fe) mg, Take 324 mg by mouth 2 (two) times a day before meals (Patient not taking: Reported on 9/2/2022), Disp: , Rfl:   •  gabapentin (Neurontin) 300 mg capsule, Take 1 capsule (300 mg total) by mouth 3 (three) times a day for 14 days (Patient not taking: No sig reported), Disp: 42 capsule, Rfl: 0  •  NON FORMULARY, , Disp: , Rfl:       Review of Systems:  Have you recently had or currently have any of the following? If YES, what are you doing for the problem?     · Fever, chills or unintended weight loss: No  · Sudden loss or change in your vision: No  · Nausea, vomiting or blood in your stool: No  · Painful or swollen joints: No  · Wheezing or cough: No  · Changing mole or non-healing wound: No  · Nosebleeds: No  · Excessive sweating: No  · Easy or prolonged bleeding? No  · Over the last 2 weeks, how often have you been bothered by the following problems? · Taking little interest or pleasure in doing things: 1 - Not at All  · Feeling down, depressed, or hopeless: 1 - Not at All  · Rapid heartbeat with epinephrine:  No    · FEMALES ONLY:    · Are you pregnant or planning to become pregnant? No  · Are you currently or planning to be nursing or breast feeding? No    · Any known allergies? • Allergies   • Allergen • Reactions   • • • Benadryl [Diphenhydramine] • Anaphylaxis   •  •  • Throat closing   • • • Phenergan [Promethazine] • Anaphylaxis   •       Physical Exam:    • Was a chaperone (Derm Clinical Assistant) present throughout the entire Physical Exam? Yes    • Did the Dermatology Team specifically  the patient on the importance of a Full Skin Exam to be sure that nothing is missed clinically?  Yes}  o Did the patient ultimately request or accept a Full Skin Exam?  NO  o Did the patient specifically refuse to have the areas "under-the-bra" examined by the Dermatologist? No  o Did the patient specifically refuse to have the areas "under-the-underwear" examined by the Dermatologist? No    CONSTITUTIONAL:   Vitals:    09/02/22 1542   Temp: 98 °F (36 7 °C)   TempSrc: Temporal   Weight: 62 1 kg (137 lb)   Height: 5' 3" (1 6 m)       PSYCH: Normal mood and affect  EYES: Normal conjunctiva  ENT: Normal lips and oral mucosa  CARDIOVASCULAR: No edema  RESPIRATORY: Normal respirations  HEME/LYMPH/IMMUNO:  No regional lymphadenopathy except as noted below in "ASSESSMENT AND PLAN BY DIAGNOSIS"    SKIN:  FULL ORGAN SYSTEM EXAM   Hair, Scalp, Ears, Face Normal except as noted below in Assessment   Neck, Cervical Chain Nodes Normal except as noted below in Assessment   Right Arm/Hand/Fingers Normal except as noted below in Assessment   Left Arm/Hand/Fingers Normal except as noted below in Assessment   Chest/Breasts/Axillae Viewed areas Normal except as noted below in Assessment   Abdomen, Umbilicus Normal except as noted below in Assessment   Back/Spine Normal except as noted below in Assessment   Right Leg, Foot, Toes Normal except as noted below in Assessment   Left Leg, Foot, Toes Normal except as noted below in Assessment        Assessment and Plan by Diagnosis:    History of Present Condition:    • Duration:  How long has this been an issue for you?    o  1 year  • Location Affected:  Where on the body is this affecting you?    o  Back and legs  • Quality:  Is there any bleeding, pain, itch, burning/irritation, or redness associated with the skin lesion?    o  Pain on the back  • Severity:  Describe any bleeding, pain, itch, burning/irritation, or redness on a scale of 1 to 10 (with 10 being the worst)  o  6  • Timing:  Does this condition seem to be there pretty constantly or do you notice it more at specific times throughout the day?    o  Constant  • Context:  Have you ever noticed that this condition seems to be associated with specific activities you do?    o  Denies  • Modifying Factors:    o Anything that seems to make the condition worse?    -  Denies  o What have you tried to do to make the condition better? -  Denies  • Associated Signs and Symptoms:  Does this skin lesion seem to be associated with any of the following:  o  SL AMB DERM SIGNS AND SYMPTOMS: Skin color changes       1   SYRINGOMA      Physical Exam:  • Anatomic Location Affected:  Bilateral eyelids  • Morphological Description:  See photo below     Assessment and Plan:  Based on a thorough discussion of this condition and the management approach to it (including a comprehensive discussion of the known risks, side effects and potential benefits of treatment), the patient (family) agrees to implement the following specific plan:    • Benign; Reassured  • Discussed that a consult with our cosmetic doctor could be scheduled to discuss cosmetic treatment  Patient deferred at this time  2  SEBORRHEIC KERATOSIS; NON-INFLAMED    Physical Exam:  • Anatomic Location Affected:  Legs and left shoulder  • Morphological Description: Thickening of the skin                 Additional History of Present Condition:  Denies itch, burn, pain, bleeding or ulceration  Present constantly; nothing seems to make it worse or better  No prior treatment  Assessment and Plan:  Based on a thorough discussion of this condition and the management approach to it (including a comprehensive discussion of the known risks, side effects and potential benefits of treatment), the patient (family) agrees to implement the following specific plan:    • RTO ASAP with any new or changing skin lesions  • Discussed having the spot on the left shoulder blade removed  Patient is aware that the spot is not covered by insurance and that there may be a possible lab fee on top of it that would get billed separately  For the spot it would cost $250    Seborrheic Keratosis  A seborrheic keratosis is a harmless warty spot that appears during adult life as a common sign of skin aging  Seborrheic keratoses can arise on any area of skin, covered or uncovered, with the usual exception of the palms and soles  They do not arise from mucous membranes  Seborrheic keratoses can have highly variable appearance  Seborrheic keratoses are extremely common  It has been estimated that over 90% of adults over the age of 61 years have one or more of them  They occur in males and females of all races, typically beginning to erupt in the 35s or 45s  They are uncommon under the age of 21 years  The precise cause of seborrhoeic keratoses is not known  Seborrhoeic keratoses are considered degenerative in nature   As time goes by, seborrheic keratoses tend to become more numerous  Some people inherit a tendency to develop a very large number of them; some people may have hundreds of them  The name "seborrheic keratosis" is misleading, because these lesions are not limited to a seborrhoeic distribution (scalp, mid-face, chest, upper back), nor are they formed from sebaceous glands, nor are they associated with sebum -- which is greasy  Seborrheic keratosis may also be called "SK," "Seb K," "basal cell papilloma," "senile wart," or "barnacle "      Researchers have noted:  • Eruptive seborrhoeic keratoses can follow sunburn or dermatitis  • Skin friction may be the reason they appear in body folds  • Viral cause (e g , human papillomavirus) seems unlikely  • Stable and clonal mutations or activation of FRFR3, PIK3CA, GENE, AKT1 and EGFR genes are found in seborrhoeic keratoses  • Seborrhoeic keratosis can arise from solar lentigo  • FRFR3 mutations also arise in solar lentigines  These mutations are associated with increased age and location on the head and neck, suggesting a role of ultraviolet radiation in these lesions  • Seborrheic keratoses do not harbour tumour suppressor gene mutations  • Epidermal growth factor receptor inhibitors, which are used to treat some cancers, often result in an increase in verrucal (warty) keratoses  There is no easy way to remove multiple lesions on a single occasion  Unless a specific lesion is "inflamed" and is causing pain or stinging/burning or is bleeding, most insurance companies do not authorize treatment      3      Scribe Attestation      I,:  Sophia Mo am acting as a scribe while in the presence of the attending physician :       I,:  Hiwot Hickman MD personally performed the services described in this documentation    as scribed in my presence :

## 2022-09-07 ENCOUNTER — OFFICE VISIT (OUTPATIENT)
Dept: PLASTIC SURGERY | Facility: CLINIC | Age: 47
End: 2022-09-07
Payer: MEDICARE

## 2022-09-07 DIAGNOSIS — Z98.890 S/P BILATERAL BREAST REDUCTION: Primary | ICD-10-CM

## 2022-09-07 PROCEDURE — 99212 OFFICE O/P EST SF 10 MIN: CPT | Performed by: PHYSICIAN ASSISTANT

## 2022-09-07 NOTE — PROGRESS NOTES
Assessment/Plan:     Patient is a 43-year-old female who is status post bilateral breast reduction with Dr Valery Brizuela on 03/[facility]/2022[facility] see HPI  The patient returns to the office today for routine postoperative check  She is extremely pleased with her functional and cosmetic results  The patient will return to the office in approximately 6 months for postoperative check  Diagnoses and all orders for this visit:    S/P bilateral breast reduction          Subjective:     Patient ID: Parveen Christensen is a 51 y o  female  HPI     The patient reports that she has been having intermittent shock-like pains on her left lateral breast   This is most noticeable while she is in the shower  I did discuss with the patient that this was likely nerve regeneration  She is not interested in pharmacological treatment at this time  No additional issues or concerns  Review of Systems    See hpi    Objective:     Physical Exam      bilateral breasts are soft, supple and grossly symmetric    Scarring is minimal

## 2022-09-23 ENCOUNTER — OFFICE VISIT (OUTPATIENT)
Dept: GASTROENTEROLOGY | Facility: MEDICAL CENTER | Age: 47
End: 2022-09-23
Payer: MEDICARE

## 2022-09-23 VITALS
TEMPERATURE: 97.9 F | HEART RATE: 68 BPM | SYSTOLIC BLOOD PRESSURE: 153 MMHG | BODY MASS INDEX: 24.13 KG/M2 | WEIGHT: 136.2 LBS | DIASTOLIC BLOOD PRESSURE: 95 MMHG

## 2022-09-23 DIAGNOSIS — Z12.11 COLON CANCER SCREENING: ICD-10-CM

## 2022-09-23 DIAGNOSIS — K21.9 GASTROESOPHAGEAL REFLUX DISEASE WITHOUT ESOPHAGITIS: Primary | ICD-10-CM

## 2022-09-23 DIAGNOSIS — K21.9 GASTROESOPHAGEAL REFLUX DISEASE, UNSPECIFIED WHETHER ESOPHAGITIS PRESENT: ICD-10-CM

## 2022-09-23 DIAGNOSIS — K59.04 CHRONIC IDIOPATHIC CONSTIPATION: ICD-10-CM

## 2022-09-23 PROCEDURE — 99214 OFFICE O/P EST MOD 30 MIN: CPT | Performed by: INTERNAL MEDICINE

## 2022-09-23 RX ORDER — RABEPRAZOLE SODIUM 20 MG/1
20 TABLET, DELAYED RELEASE ORAL 2 TIMES DAILY
Qty: 60 TABLET | Refills: 0 | Status: SHIPPED | OUTPATIENT
Start: 2022-09-23 | End: 2022-10-23

## 2022-09-23 RX ORDER — OMEPRAZOLE 40 MG/1
40 CAPSULE, DELAYED RELEASE ORAL 2 TIMES DAILY
Qty: 60 CAPSULE | Refills: 2 | Status: SHIPPED | OUTPATIENT
Start: 2022-09-23

## 2022-09-23 RX ORDER — LINACLOTIDE 145 UG/1
145 CAPSULE, GELATIN COATED ORAL DAILY
Qty: 14 CAPSULE | Refills: 0 | Status: SHIPPED | COMMUNITY
Start: 2022-09-23 | End: 2022-10-07

## 2022-09-23 NOTE — PROGRESS NOTES
Outpatient Follow up  [address]  [facility] [address]  [facility] [address]  Sathya Gilliam MD  Ph : [phone]  Fax : [phone]  Mobile : [phone]  Email : [email or web address]  Also available on [employer]    Vesta Butcher 51 y o  female MRN: [id number]    PCP: Lexus Bartlett DO  Referring: Lexus Bartlett DO  [address] [place] [address],  [address] [place] [address]    Vesta Butcher presented for a follow up visit  My recommendations are included  Please do not hesitate to contact me with any questions you may have  ASSESSMENT AND PLAN:      No problem-specific Assessment & Plan notes found for this encounter  Diagnoses and all orders for this visit:    Gastroesophageal reflux disease without esophagitis  -     RABEprazole (ACIPHEX) 20 MG tablet; Take 1 tablet (20 mg total) by mouth 2 (two) times a day    Colon cancer screening  -     Cologuard    Chronic idiopathic constipation  -     linaCLOtide (Linzess) 145 MCG CAPS; Take 1 capsule (145 mcg total) by mouth daily for 14 days    Gastroesophageal reflux disease, unspecified whether esophagitis present  -     omeprazole (PriLOSEC) 40 MG capsule; Take 1 capsule (40 mg total) by mouth 2 (two) times a day       40-year-old lady with history of gastric sleeve with staple line leak  She had a complicated hospital stay  Leak was then closed using the over the scope clip  She has been doing well  However she has significant acid reflux  She is not a candidate for TIF due to the sleeve  She could have the [address] but the padlock is at the GE junction and I have had discussions with Dr Dona Max and he does not recommend removing the padlock at this time  I have recommended the following :   1  Try AcipHex once a day or twice a day to see if that helps  2  Alternatively we can continue with the omeprazole 40 mg twice a day with the Pepcid at night  3   Would recommend to cut down the omeprazole to 20 mg twice a day or 40 mg once a day when ever possible  4  Linzess 145 mcg daily as a trial    5  Try Gaviscon  6  If symptoms persist she could see Dr Bernardo Dukes again for possible conversion to bypass but even that would be very complicated due to her complication post surgical course    ______________________________________________________________________    SUBJECTIVE:  Verona Nogueira is a 65-year-old lady with history gastric sleeve with a leak which was sealed using a padlock clip  She had a complicated course in the hospital   She is doing well at this time  She has been having ongoing heartburn  Even plain water gives her heartburn  Certain foods including peanut butter and sauces make the pain worse  She takes omeprazole twice a day  She did have an endoscopy done recently with a Bravo study  This showed a high DeMeester score with increased acid exposure time  On endoscopy no esophagitis was seen  There was evidence of prior sleeve gastrectomy and the padlock clip was also seen in the proximal gastric body  The duodenum was normal   She is active  No significant weight loss at this time  She has never had a colonoscopy done  Interval history :   Has some regurgitation at times - twice this week  She hello symptoms with regular water  She does well with seltzer water  She was tapered down on the omeprazole but had to go back up 40 mg twice a day  She has constipation  She takes miralax or dulcolax  She goes twice a week  It is hard for her to have a bowel movement  REVIEW OF SYSTEMS IS OTHERWISE NEGATIVE        Historical Information   Past Medical History:   Diagnosis Date    Abdominal pain     Acute cystitis with hematuria 1/3/2020    Brain condition     Pseudotumor Cerebri     Chronic kidney disease     De Quervain's disease (tenosynovitis)     Esophageal perforation     Gastric leak     GERD (gastroesophageal reflux disease)     Headache     Idiopathic intracranial hypertension     Kidney stone     Migraine     No blood products     per pt: personal and [employer] beliefs  surgeon office aware 12/13/19    Papilledema, both eyes     PONV (postoperative nausea and vomiting)     Postgastrectomy malabsorption     Presence of lumboperitoneal shunt     Resolved: Sep 20, 2017    Rotator cuff tendinitis     Resolved: Aug 23, 2017    Visual field defect      Past Surgical History:   Procedure Laterality Date    BREAST BIOPSY Left 1993    benign    CSF SHUNT      LP shunt - 2015 -  shunt - 2017    ESOPHAGOGASTRODUODENOSCOPY N/A 2/23/2018    Procedure: ESOPHAGOGASTRODUODENOSCOPY (EGD) WITH ESOPHAGEAL STENT PLACEMENT;  Surgeon: Kishan Lynn MD;  [facility];  Service: Thoracic    ESOPHAGOGASTRODUODENOSCOPY N/A 2/23/2018    Procedure: ESOPHAGOGASTRODUODENOSCOPY (EGD) WITH REMOVAL ESOPHAGEAL STENT  AND REPLACEMENT WITH 23mm X155mm [address];  Surgeon: Kishan Lynn MD;  [facility];  Service: Thoracic    ESOPHAGOGASTRODUODENOSCOPY N/A 3/28/2018    Procedure: ESOPHAGOGASTRODUODENOSCOPY (EGD) with PEJ placement ;  Surgeon: Sha Lundberg MD;  [facility]; Service: Gastroenterology    ESOPHAGOGASTRODUODENOSCOPY N/A 4/5/2018    Procedure: ESOPHAGOGASTRODUODENOSCOPY (EGD) with botox injection and kaofed placement;  Surgeon: Sanju Bettencourt DO;  [facility]; Service: Gastroenterology    ESOPHAGOGASTRODUODENOSCOPY N/A 4/10/2018    Procedure: ESOPHAGOGASTRODUODENOSCOPY (EGD) with Kaofed placement;  Surgeon: Mello Alvarado MD;  [facility]; Service: Gastroenterology    ESOPHAGOSCOPY WITH STENT INSERTION N/A 1/24/2018    Procedure: INSERTION STENT ESOPHAGEAL;  Surgeon: Kierra Da Silva MD;  [facility];   Service: Gastroenterology    EYE SURGERY Bilateral 1980    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)     FL RETROGRADE PYELOGRAM  6/24/2020    FL RETROGRADE PYELOGRAM  8/21/2021    GASTRIC BYPASS 12/19/2016    Lap sleeve gastrectomy w/shunt length shortening procedure    HIATAL HERNIA REPAIR      HYSTERECTOMY  03/14/2013    IR LUMBAR PUNCTURE  8/29/2018    LAPAROTOMY N/A 1/25/2018    Procedure: Exploratory Laparotomy, wash out,placement of drains, placement of NG feeding tube ;   Surgeon: Gina Pinto DO;  [facility];  Service: General    LUMBAR PERITONEAL SHUNT  11/19/2015    Laparoscopic assisted    IA BREAST REDUCTION Bilateral 3/25/2022    Procedure: BILATERAL BREAST REDUCTION;  Surgeon: Alethea Suarez MD;  [facility];  Service: Plastics    IA CREATE SHUNT:VENTRIC-ATRIAL Right 12/18/2019    Procedure: IMAGE GUIDED INSERTION OF RIGHT CORONAL VENTRICULAR-ATRIAL SHUNT;  Surgeon: Junior Browne MD;  [facility];  Service: Neurosurgery    IA CREATE SHUNT:VENTRIC-PERITONEAL Right 5/31/2017    Procedure: IMAGE GUIDED CORONAL PLACEMENT OF PROGRAMABLE VENTRICULAR-PERITONEAL SHUNT, REMOVAL OF LP SHUNT ;  Surgeon: Junior Browne MD;  [facility];  Service: Neurosurgery    IA CYSTO/URETERO W/LITHOTRIPSY &INDWELL STENT INSRT Bilateral 6/24/2020    Procedure: CYSTOSCOPY URETEROSCOPY WITH LITHOTRIPSY HOLMIUM LASER, RETROGRADE PYELOGRAM AND exchange bilateral  STENTs URETERAL;  Surgeon: Vera Thompson MD;  [facility];  Service: Urology    IA CYSTO/URETERO W/LITHOTRIPSY &INDWELL STENT INSRT Left 8/21/2021    Procedure: CYSTOSCOPY; LEFT URETEROSCOPY WITH RETROGRADE PYELOGRAM, REMOVAL OF STONE AND INSERTION LEFT URETERAL STENT;  Surgeon: George Medel MD;  [facility];  Service: Urology    IA CYSTOURETHROSCOPY,URETER CATHETER N/A 6/6/2020    Procedure: Bilateral CYSTOSCOPY RETROGRADE PYELOGRAM WITH INSERTION STENT URETERAL;  Surgeon: Osmar Srinivasan MD;  Location: [address] MAIN OR;  Service: Urology    IA EGD TRANSORAL BIOPSY SINGLE/MULTIPLE N/A 6/27/2018    Procedure: ESOPHAGOGASTRODUODENOSCOPY (EGD) with padlock clip placement;  Surgeon: Caterina Granger Dionte Cabral MD;  [facility];   Service: Bariatrics    UT REMOVAL,COMPLETE CSF SHUNT,W/O REPLACE Right 2/5/2018    Procedure: Removal of  shunt;  Surgeon: Hafsa Swanson MD;  [facility];  Service: Neurosurgery    UT REPLACEMENT/REVISION,CSF SHUNT Right 1/25/2018    Procedure: Externalization of right-sided SHUNT VENTRICULAR-PERITONEAL in anterior chest wall ribs two and three level  ;  Surgeon: Hilda Malone MD;  [facility];  Service: Neurosurgery    WRIST SURGERY Right     x3 2006, 2008     Social History   Social History     Substance and Sexual Activity   Alcohol Use Never     Social History     Substance and Sexual Activity   Drug Use Yes    Frequency: 7 0 times per week    Types: Marijuana    Comment: medical marijuana, vaping & topical      Social History     Tobacco Use   Smoking Status Former Smoker    Packs/day: 0 50    Years: 20 00    Pack years: 10 00    Types: Cigarettes    Quit date: 8/24/2012    Years since quitting: 10 0   Smokeless Tobacco Never Used     Family History   Problem Relation Age of Onset    No Known Problems Mother     No Known Problems Father     Thyroid cancer Brother 36    Colon cancer Maternal Grandfather 44    No Known Problems Paternal Grandmother     Cancer Paternal Grandfather     Cancer Family         Gastric    Leukemia Family     Colon cancer Family     Pancreatic cancer Family     No Known Problems Sister     No Known Problems Daughter     No Known Problems Maternal Grandmother     No Known Problems Son     No Known Problems Maternal Aunt     No Known Problems Maternal Aunt     No Known Problems Paternal Aunt        Meds/Allergies       Current Outpatient Medications:     acetaminophen (TYLENOL) 500 mg tablet    ascorbic acid (VITAMIN C) 250 MG CHEW    Biotin 1 MG CAPS    calcium citrate-vitamin D (CITRACAL+D) 315-200 MG-UNIT per tablet    docusate sodium (COLACE) 100 mg capsule    folic acid (FOLVITE) 1 mg tablet    linaCLOtide (Linzess) 145 MCG CAPS    Multiple Vitamin (MULTIVITAMIN) tablet    Multiple Vitamins-Minerals (Hair/Skin/Nails) CAPS    NON FORMULARY    omeprazole (PriLOSEC) 40 MG capsule    ondansetron (ZOFRAN) 4 mg tablet    RABEprazole (ACIPHEX) 20 MG tablet    vitamin B-12 (VITAMIN B-12) 500 mcg tablet    famotidine (PEPCID) 40 MG tablet    ferrous gluconate 324 (37 5 Fe) mg    gabapentin (Neurontin) 300 mg capsule    Allergies   Allergen Reactions    Benadryl [Diphenhydramine] Anaphylaxis     Throat closing    Phenergan [Promethazine] Anaphylaxis           Objective     Blood pressure 153/95, pulse 68, temperature 97 9 °F (36 6 °C), temperature source Tympanic, weight 61 8 kg (136 lb 3 2 oz), not currently breastfeeding  Body mass index is 24 13 kg/m²  PHYSICAL EXAM:      Physical Exam  Constitutional:       Appearance: Normal appearance  She is well-developed  HENT:      Head: Normocephalic and atraumatic  Eyes:      General: No scleral icterus  Conjunctiva/sclera: Conjunctivae normal       Pupils: Pupils are equal, round, and reactive to light  Cardiovascular:      Rate and Rhythm: Normal rate and regular rhythm  Heart sounds: Normal heart sounds  Pulmonary:      Effort: Pulmonary effort is normal  No respiratory distress  Breath sounds: Normal breath sounds  Abdominal:      General: Bowel sounds are normal  There is no distension  Palpations: Abdomen is soft  There is no mass  Tenderness: There is no abdominal tenderness  Hernia: No hernia is present  Musculoskeletal:         General: Normal range of motion  Cervical back: Normal range of motion  Lymphadenopathy:      Cervical: No cervical adenopathy  Skin:     General: Skin is warm  Neurological:      Mental Status: She is alert and oriented to person, place, and time  Psychiatric:         Behavior: Behavior normal          Thought Content:  Thought content normal          Lab Results:   No visits with results within 1 Day(s) from this visit  Latest known visit with results is:   Appointment on 07/30/2022   Component Date Value    WBC 07/30/2022 5 54     RBC 07/30/2022 5 19 (A)    Hemoglobin 07/30/2022 15 2     Hematocrit 07/30/2022 46 1     MCV 07/30/2022 89     MCH 07/30/2022 29 3     MCHC 07/30/2022 33 0     RDW 07/30/2022 12 9     MPV 07/30/2022 9 6     Platelets 89/81/0174 183     nRBC 07/30/2022 0     Neutrophils Relative 07/30/2022 70     Immat GRANS % 07/30/2022 0     Lymphocytes Relative 07/30/2022 20     Monocytes Relative 07/30/2022 6     Eosinophils Relative 07/30/2022 3     Basophils Relative 07/30/2022 1     Neutrophils Absolute 07/30/2022 3 87     Immature Grans Absolute 07/30/2022 0 02     Lymphocytes Absolute 07/30/2022 1 13     Monocytes Absolute 07/30/2022 0 32     Eosinophils Absolute 07/30/2022 0 16     Basophils Absolute 07/30/2022 0 04     Sodium 07/30/2022 141     Potassium 07/30/2022 3 6     Chloride 07/30/2022 109 (A)    CO2 07/30/2022 30     ANION GAP 07/30/2022 2 (A)    BUN 07/30/2022 19     Creatinine 07/30/2022 0 75     Glucose, Fasting 07/30/2022 85     Calcium 07/30/2022 9 5     AST 07/30/2022 12     ALT 07/30/2022 17     Alkaline Phosphatase 07/30/2022 54     Total Protein 07/30/2022 7 2     Albumin 07/30/2022 3 9     Total Bilirubin 07/30/2022 0 52     eGFR 07/30/2022 95     CRP 07/30/2022 <3 0     Sed Rate 07/30/2022 3     Vitamin B-12 07/30/2022 456     Erythropoietin 07/30/2022 7 8     Carbon Monoxide, Blood 07/30/2022 2 0 (A)    LD 07/30/2022 147     Folate 07/30/2022 15 5     Vit D, 25-Hydroxy 07/30/2022 78 0     Iron Saturation 07/30/2022 27     TIBC 07/30/2022 360     Iron 07/30/2022 97     Ferritin 07/30/2022 15          Radiology Results:   No results found

## 2022-09-23 NOTE — PATIENT INSTRUCTIONS
1  Try AcipHex once a day or twice a day to see if that helps  2  Alternatively we can continue with the omeprazole 40 mg twice a day with the Pepcid at night  3  Would recommend to cut down the omeprazole to 20 mg twice a day or 40 mg once a day when ever possible      4  Linzess 145 mcg daily as a trial    5  Try Gaviscon

## 2022-09-26 ENCOUNTER — TELEPHONE (OUTPATIENT)
Dept: GASTROENTEROLOGY | Facility: MEDICAL CENTER | Age: 47
End: 2022-09-26

## 2022-09-26 ENCOUNTER — OFFICE VISIT (OUTPATIENT)
Dept: BARIATRICS | Facility: CLINIC | Age: 47
End: 2022-09-26
Payer: MEDICARE

## 2022-09-26 VITALS
BODY MASS INDEX: 24.27 KG/M2 | SYSTOLIC BLOOD PRESSURE: 120 MMHG | WEIGHT: 137 LBS | DIASTOLIC BLOOD PRESSURE: 70 MMHG | OXYGEN SATURATION: 99 % | TEMPERATURE: 98.2 F | HEIGHT: 63 IN | HEART RATE: 65 BPM

## 2022-09-26 DIAGNOSIS — K21.9 GASTROESOPHAGEAL REFLUX DISEASE WITHOUT ESOPHAGITIS: ICD-10-CM

## 2022-09-26 DIAGNOSIS — Z48.815 ENCOUNTER FOR SURGICAL AFTERCARE FOLLOWING SURGERY OF DIGESTIVE SYSTEM: Primary | ICD-10-CM

## 2022-09-26 DIAGNOSIS — K91.2 POSTSURGICAL MALABSORPTION: ICD-10-CM

## 2022-09-26 DIAGNOSIS — Z98.84 BARIATRIC SURGERY STATUS: ICD-10-CM

## 2022-09-26 PROCEDURE — 99213 OFFICE O/P EST LOW 20 MIN: CPT | Performed by: NURSE PRACTITIONER

## 2022-09-26 NOTE — PROGRESS NOTES
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 with endoscopic padlock closure  Had EGD with Cassandra with Dr Isaak Scott on 02/05/2020  Presents to the office today for annual visit  Overall doing fair, still having reflux concerns but is also following with GI for this  She is having a medication adjustment to help with her reflux  PLAN:     - Continue with GERD precautions and follow up with GI to maximize pharmacological treatment  If symptoms worsen, can return to office to see Dr Isaak Scott to discuss further surgical interventions as mentioned in previous visit - options are TIF vs repeat Stretta procedure  - Routine follow up in 1 year for annual visit  - Continue with healthy lifestyle, adequate protein intake of 60 gm, fluid intake of at least 64 oz  - Continue with MVI daily  Adjust calcium supplement use due to hx of frequent kidney stones  Advised patient to cut down  She will check the dosages on her chewables  - Activity as tolerated  - Labs ordered and will adjust accordingly if any deficiency  - Follow up with RD and SW as needed  · Continued/Maintain healthy weight loss with good nutrition intakes  · Adequate hydration with at least 64oz  fluid intake  · Follow diet as discussed  · Follow vitamin and mineral recommendations as reviewed with you  · Exercise as tolerated  · Colonoscopy referral made: has cologard order  · Mammogram - UTD    · Follow-up in 1 year for annual visit  We kindly ask that your arrive 15 minutes before your scheduled appointment time with your provider to allow our staff to room you, get your vital signs and update your chart      · Get lab work done prior to annual visit  Please call the office if you need a script  It is recommended to check with your insurance BEFORE getting labs done to make sure they are covered by your policy  · Call our office if you have any problems with abdominal pain especially associated with fever, chills, nausea, vomiting or any other concerns  · All  Post-bariatric surgery patients should be aware that very small quantities of any alcohol can cause impairment and it is very possible not to feel the effect  The effect can be in the system for several hours  It is also a stomach irritant  · It is advised to AVOID alcohol, Nonsteroidal antiinflammatory drugs (NSAIDS) and nicotine of all forms   Any of these can cause stomach irritation/pain  · Discussed the effects of alcohol on a bariatric patient and the increased impairment risk  · Keep up the good work! Postsurgical Malabsorption   -At risk for malabsorption of vitamins/minerals secondary to malabsorption and restriction of intake from bariatric surgery  -Currently taking adequate postop bariatric surgery vitamin supplementation  -Last set of bariatric labs completed on 11/2021 and showed WNL  -Next set of bariatric labs ordered for approximately 2 months  -Patient received education about the importance of adhering to a lifelong supplementation regimen to avoid vitamin/mineral deficiencies      Diagnoses and all orders for this visit:    Encounter for surgical aftercare following surgery of digestive system  -     Zinc; Future  -     Vitamin B1, whole blood; Future  -     Vitamin A; Future  -     PTH, intact; Future    Bariatric surgery status  -     Zinc; Future  -     Vitamin B1, whole blood; Future  -     Vitamin A; Future  -     PTH, intact; Future    BMI 24 0-24 9, adult  -     Zinc; Future  -     Vitamin B1, whole blood; Future  -     Vitamin A; Future  -     PTH, intact;  Future    Postsurgical malabsorption  -     Zinc; Future  -     Vitamin B1, whole blood; Future  -     Vitamin A; Future  -     PTH, intact; Future    Gastroesophageal reflux disease without esophagitis         Subjective:      Patient ID: Carly Hensley is a 49 y o  female  -s/p Vertical Sleeve Gastrectomy with Dr Tyler Palacios in 4709, with PEHR complicated by leak of GEJ, required open abdomen and prolong hospitalization in 2018, 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 with endoscopic padlock closure  Had EGD with Stretta with Dr Ran Mcguire on 02/05/2020  Presents to the office today for annual visit  Overall doing fair, still having reflux concerns but is also following with GI for this  She is having a medication adjustment to help with her reflux  Initial: 213 lbs (prior to sleeve); 137 lbs (prior to stretta)  Current: 137 lbs   EWL: (Weight loss is ahead of schedule at this post surgical period )  Keron: 80-85 lbs  Current BMI is Body mass index is 24 27 kg/m²  · Tolerating a regular diet-yes  · Eating at least 60 grams of protein per day-yes  · Following 30/60 minute rule with liquids-yes  · Drinking at least 64 ounces of fluid per day-yes  · Drinking carbonated beverages-yes  · Sufficient exercise-yes - do weighted hula hula  · Using NSAIDs regularly-no  · Using nicotine-no  · Using alcohol-no  · Supplements: Multivitamins    · EWL is 49%, which places the patient ahead of schedule for expected post surgical weight loss at this time  The following portions of the patient's history were reviewed and updated as appropriate: allergies, current medications, past family history, past medical history, past social history, past surgical history and problem list     Review of Systems   Constitutional: Negative  Respiratory: Negative  Cardiovascular: Negative  Gastrointestinal:        Reflux     Musculoskeletal: Negative  Neurological: Negative  Psychiatric/Behavioral: Negative            Objective:    /70 (BP Location: Left arm, Patient Position: Sitting, Cuff Size: Large)   Pulse 65   Temp 98 2 °F (36 8 °C) (Tympanic)   Ht 5' 3" (1 6 m)   Wt 62 1 kg (137 lb)   LMP  (LMP Unknown)   SpO2 99%   BMI 24 27 kg/m²      Physical Exam  Vitals and nursing note reviewed  Constitutional:       Appearance: Normal appearance  She is normal weight  Cardiovascular:      Rate and Rhythm: Normal rate and regular rhythm  Pulses: Normal pulses  Heart sounds: Normal heart sounds  Pulmonary:      Effort: Pulmonary effort is normal       Breath sounds: Normal breath sounds  Abdominal:      General: Bowel sounds are normal       Palpations: Abdomen is soft  Tenderness: There is no abdominal tenderness  Musculoskeletal:         General: Normal range of motion  Skin:     General: Skin is warm and dry  Neurological:      General: No focal deficit present  Mental Status: She is alert and oriented to person, place, and time  Psychiatric:         Mood and Affect: Mood normal          Behavior: Behavior normal          Thought Content:  Thought content normal          Judgment: Judgment normal

## 2022-09-26 NOTE — TELEPHONE ENCOUNTER
Patients GI provider:  Dr Darek Fields    Number to return call: [phone]    Reason for call: Pt calling because her insurance will not cover famotidine  She would like to know if Dr Darek Fields can write a letter to the insurance      Scheduled procedure/appointment date if applicable: Appt 66/9/96

## 2022-09-27 ENCOUNTER — TELEPHONE (OUTPATIENT)
Dept: GASTROENTEROLOGY | Facility: MEDICAL CENTER | Age: 47
End: 2022-09-27

## 2022-09-27 NOTE — TELEPHONE ENCOUNTER
Started a prior authorization for Famotide 40MG through [employer]   Key # [id number]--per [employer] whole care response   Prior Authorization Not Required for her medication

## 2022-09-27 NOTE — TELEPHONE ENCOUNTER
Spoke to Tita Melchor from [address] [place] [address] 40mg her cpoay will be $1 00   Started a prior authorization for Famotide 40MG through [employer] # [email or web address]--per [facility] care response   Prior Authorization Not Required for her medication

## 2022-09-27 NOTE — TELEPHONE ENCOUNTER
Spoke to Rao buckner from [address] 40mg her copay will be $1 00   Started a prior authorization for Famotide 40MG through [employer] # [email or web address]--per [employer] [facility] care response   Prior Authorization Not Required for her medication

## 2022-09-27 NOTE — TELEPHONE ENCOUNTER
Started a prior authorization for Famotide 40MG through [employer]   Key # [id number]--per [employer] whole care response   Prior Authorization Not Required for her medication

## 2022-10-12 PROBLEM — Z12.11 COLON CANCER SCREENING: Status: RESOLVED | Noted: 2021-10-21 | Resolved: 2022-10-12

## 2022-11-09 ENCOUNTER — HOSPITAL ENCOUNTER (OUTPATIENT)
Dept: INFUSION CENTER | Facility: HOSPITAL | Age: 47
Discharge: HOME | End: 2022-11-09
Attending: PSYCHIATRY & NEUROLOGY

## 2022-11-09 VITALS
SYSTOLIC BLOOD PRESSURE: 120 MMHG | OXYGEN SATURATION: 99 % | HEART RATE: 60 BPM | RESPIRATION RATE: 18 BRPM | TEMPERATURE: 98.7 F | DIASTOLIC BLOOD PRESSURE: 65 MMHG

## 2022-11-09 DIAGNOSIS — E53.8 FOLIC ACID DEFICIENCY: ICD-10-CM

## 2022-11-09 DIAGNOSIS — G43.109 MIGRAINE WITH AURA AND WITHOUT STATUS MIGRAINOSUS, NOT INTRACTABLE: Primary | ICD-10-CM

## 2022-11-09 DIAGNOSIS — E53.8 B12 DEFICIENCY: ICD-10-CM

## 2022-11-09 DIAGNOSIS — D50.8 IRON DEFICIENCY ANEMIA SECONDARY TO INADEQUATE DIETARY IRON INTAKE: ICD-10-CM

## 2022-11-09 LAB
ALBUMIN SERPL BCP-MCNC: 4 G/DL (ref 3.5–5)
ALP SERPL-CCNC: 53 U/L (ref 34–104)
ALT SERPL W P-5'-P-CCNC: 20 U/L (ref 7–52)
ANION GAP SERPL CALCULATED.3IONS-SCNC: 6 MMOL/L (ref 4–13)
AST SERPL W P-5'-P-CCNC: 20 U/L (ref 13–39)
BASOPHILS # BLD AUTO: 0.03 THOUSANDS/ÂΜL (ref 0–0.1)
BASOPHILS NFR BLD AUTO: 1 % (ref 0–1)
BILIRUB SERPL-MCNC: 0.43 MG/DL (ref 0.2–1)
BUN SERPL-MCNC: 19 MG/DL (ref 5–25)
CALCIUM SERPL-MCNC: 8.9 MG/DL (ref 8.4–10.2)
CHLORIDE SERPL-SCNC: 104 MMOL/L (ref 96–108)
CO2 SERPL-SCNC: 29 MMOL/L (ref 21–32)
CREAT SERPL-MCNC: 0.58 MG/DL (ref 0.6–1.3)
CRP SERPL QL: 1.1 MG/L
EOSINOPHIL # BLD AUTO: 0.13 THOUSAND/ÂΜL (ref 0–0.61)
EOSINOPHIL NFR BLD AUTO: 4 % (ref 0–6)
ERYTHROCYTE [DISTWIDTH] IN BLOOD BY AUTOMATED COUNT: 13.2 % (ref 11.6–15.1)
ERYTHROCYTE [SEDIMENTATION RATE] IN BLOOD: 2 MM/HOUR (ref 0–19)
GFR SERPL CREATININE-BSD FRML MDRD: 109 ML/MIN/1.73SQ M
GLUCOSE SERPL-MCNC: 88 MG/DL (ref 65–140)
HCT VFR BLD AUTO: 46.7 % (ref 34.8–46.1)
HGB BLD-MCNC: 15.5 G/DL (ref 11.5–15.4)
IMM GRANULOCYTES # BLD AUTO: 0.04 THOUSAND/UL (ref 0–0.2)
IMM GRANULOCYTES NFR BLD AUTO: 1 % (ref 0–2)
LYMPHOCYTES # BLD AUTO: 0.86 THOUSANDS/ÂΜL (ref 0.6–4.47)
LYMPHOCYTES NFR BLD AUTO: 24 % (ref 14–44)
MCH RBC QN AUTO: 30.8 PG (ref 26.8–34.3)
MCHC RBC AUTO-ENTMCNC: 33.2 G/DL (ref 31.4–37.4)
MCV RBC AUTO: 93 FL (ref 82–98)
MONOCYTES # BLD AUTO: 0.38 THOUSAND/ÂΜL (ref 0.17–1.22)
MONOCYTES NFR BLD AUTO: 11 % (ref 4–12)
NEUTROPHILS # BLD AUTO: 2.09 THOUSANDS/ÂΜL (ref 1.85–7.62)
NEUTS SEG NFR BLD AUTO: 59 % (ref 43–75)
NRBC BLD AUTO-RTO: 0 /100 WBCS
PLATELET # BLD AUTO: 143 THOUSANDS/UL (ref 149–390)
PMV BLD AUTO: 9.5 FL (ref 8.9–12.7)
POTASSIUM SERPL-SCNC: 3.6 MMOL/L (ref 3.5–5.3)
PROT SERPL-MCNC: 6.5 G/DL (ref 6.4–8.4)
RBC # BLD AUTO: 5.04 MILLION/UL (ref 3.81–5.12)
SODIUM SERPL-SCNC: 139 MMOL/L (ref 135–147)
WBC # BLD AUTO: 3.53 THOUSAND/UL (ref 4.31–10.16)

## 2022-11-09 RX ORDER — SODIUM CHLORIDE 9 MG/ML
20 INJECTION, SOLUTION INTRAVENOUS ONCE
Status: COMPLETED | OUTPATIENT
Start: 2022-11-09 | End: 2022-11-09

## 2022-11-09 RX ORDER — SODIUM CHLORIDE 9 MG/ML
20 INJECTION, SOLUTION INTRAVENOUS ONCE
OUTPATIENT
Start: 2023-02-01

## 2022-11-09 RX ADMIN — EPTINEZUMAB-JJMR 100 MG: 100 INJECTION INTRAVENOUS at 14:57

## 2022-11-09 RX ADMIN — SODIUM CHLORIDE 20 ML/HR: 0.9 INJECTION, SOLUTION INTRAVENOUS at 14:43

## 2022-11-09 NOTE — PROGRESS NOTES
Tolerated todays vyepti well  No adverse reaction noted  Peripheral iv discontinued jelco intact   Discharged ambulatory with avs

## 2022-11-09 NOTE — PROGRESS NOTES
Pt reports feelingg very tired and worn down  "I think my iron level is low  Can I have my labs drawn today?" Pt is not here for hematology MD but reached out to Jag Villegas Rn at Dr Sosa Lugo office  Ok to draw iron studies with possibility of scheduling venofer in the near future  Tolerating vyepti at present without incident

## 2022-11-10 ENCOUNTER — TELEMEDICINE (OUTPATIENT)
Dept: INTERNAL MEDICINE CLINIC | Age: 47
End: 2022-11-10

## 2022-11-10 DIAGNOSIS — Z28.21 COVID-19 VACCINATION REFUSED: ICD-10-CM

## 2022-11-10 DIAGNOSIS — U07.1 COVID-19 VIRUS INFECTION: Primary | ICD-10-CM

## 2022-11-10 LAB
FERRITIN SERPL-MCNC: 123 NG/ML (ref 8–388)
FOLATE SERPL-MCNC: 8.1 NG/ML (ref 3.1–17.5)
IRON SATN MFR SERPL: 30 % (ref 15–50)
IRON SERPL-MCNC: 80 UG/DL (ref 50–170)
TIBC SERPL-MCNC: 270 UG/DL (ref 250–450)
VIT B12 SERPL-MCNC: 300 PG/ML (ref 100–900)

## 2022-11-10 RX ORDER — NIRMATRELVIR AND RITONAVIR 300-100 MG
3 KIT ORAL 2 TIMES DAILY
Qty: 30 TABLET | Refills: 0 | Status: SHIPPED | OUTPATIENT
Start: 2022-11-10 | End: 2022-11-15

## 2022-11-10 RX ORDER — METHYLPREDNISOLONE 4 MG/1
TABLET ORAL
Qty: 21 EACH | Refills: 0 | Status: SHIPPED | OUTPATIENT
Start: 2022-11-10

## 2022-11-10 NOTE — PROGRESS NOTES
Virtual Regular Visit    Verification of patient location:    Patient is located in the following state in which I hold an active license [place]      Assessment/Plan:    Problem List Items Addressed This Visit        Other    COVID-19 vaccination refused    COVID-19 virus infection - Primary    Relevant Medications    nirmatrelvir & ritonavir (Paxlovid, 300/100,) tablet therapy pack    methylPREDNISolone 4 MG tablet therapy pack               Reason for visit is   Chief Complaint   Patient presents with   • COVID-19     Positive test at home , no smell or taste    • chest tightness   • Sore Throat   • Headache   • Fatigue   • HM     Depression sc,         Encounter provider [employer], DO    Provider located at [place] [address] [place] [id number]      Recent Visits  No visits were found meeting these conditions  Showing recent visits within past 7 days and meeting all other requirements  Today's Visits  Date Type Provider Dept   11/10/22 Ray [address], DO [facility]   Showing today's visits and meeting all other requirements  Future Appointments  No visits were found meeting these conditions  Showing future appointments within next 150 days and meeting all other requirements       The patient was identified by name and date of birth  Jayme Pham was informed that this is a telemedicine visit and that the visit is being conducted through the [address] [employer] platform  She agrees to proceed     My office door was closed  No one else was in the room  She acknowledged consent and understanding of privacy and security of the video platform  The patient has agreed to participate and understands they can discontinue the visit at any time  Patient is aware this is a billable service  Anthony Santos is a 49 y o  female          HPI     Started 2 weeks ago with upper respiratory symptoms however original COVID test and 2nd COVID test a week later was negative  Today she tested herself again because symptoms are not resolving and now she was positive  She has had 2 positive test yesterday and today  Her symptoms are nausea vomiting loss of taste and smell fatigue heaviness in the chets, nasal congestion  but no wheezing or shortness of breath     Past Medical History:   Diagnosis Date   • Abdominal pain    • Acute cystitis with hematuria 1/3/2020   • Brain condition     Pseudotumor Cerebri    • Chronic kidney disease    • De Quervain's disease (tenosynovitis)    • Esophageal perforation    • Gastric leak    • GERD (gastroesophageal reflux disease)    • Headache    • Idiopathic intracranial hypertension    • Kidney stone    • Migraine    • No blood products     per pt: personal and Moravian beliefs  surgeon office aware 12/13/19   • Papilledema, both eyes    • PONV (postoperative nausea and vomiting)    • Postgastrectomy malabsorption    • Presence of lumboperitoneal shunt     Resolved: Sep 20, 2017   • Rotator cuff tendinitis     Resolved: Aug 23, 2017   • Visual field defect        Past Surgical History:   Procedure Laterality Date   • BREAST BIOPSY Left 1993    benign   • CSF SHUNT      LP shunt - 2015 -  shunt - 2017   • ESOPHAGOGASTRODUODENOSCOPY N/A 2/23/2018    Procedure: ESOPHAGOGASTRODUODENOSCOPY (EGD) WITH ESOPHAGEAL STENT PLACEMENT;  Surgeon: Mauro Merlin, MD;  [facility];  Service: Thoracic   • ESOPHAGOGASTRODUODENOSCOPY N/A 2/23/2018    Procedure: ESOPHAGOGASTRODUODENOSCOPY (EGD) WITH REMOVAL ESOPHAGEAL STENT  AND REPLACEMENT WITH 23mm X155mm [address];  Surgeon: Mauro Merlin, MD;  [facility];  Service: Thoracic   • ESOPHAGOGASTRODUODENOSCOPY N/A 3/28/2018    Procedure: ESOPHAGOGASTRODUODENOSCOPY (EGD) with PEJ placement ;  Surgeon: Vlad Grant MD;  [facility];   Service: Gastroenterology   • ESOPHAGOGASTRODUODENOSCOPY N/A 4/5/2018 Procedure: ESOPHAGOGASTRODUODENOSCOPY (EGD) with botox injection and kaofed placement;  Surgeon: Joel Galdamez DO;  [facility]; Service: Gastroenterology   • ESOPHAGOGASTRODUODENOSCOPY N/A 4/10/2018    Procedure: ESOPHAGOGASTRODUODENOSCOPY (EGD) with Kaofed placement;  Surgeon: Get Mcgrath MD;  [facility]; Service: Gastroenterology   • ESOPHAGOSCOPY WITH STENT INSERTION N/A 1/24/2018    Procedure: INSERTION STENT ESOPHAGEAL;  Surgeon: Edgardo Skinner MD;  [facility]; Service: Gastroenterology   • EYE SURGERY Bilateral 1980    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)    • FL RETROGRADE PYELOGRAM  6/24/2020   • FL RETROGRADE PYELOGRAM  8/21/2021   • GASTRIC BYPASS  12/19/2016    Lap sleeve gastrectomy w/shunt length shortening procedure   • HIATAL HERNIA REPAIR     • HYSTERECTOMY  03/14/2013   • IR LUMBAR PUNCTURE  8/29/2018   • LAPAROTOMY N/A 1/25/2018    Procedure: Exploratory Laparotomy, wash out,placement of drains, placement of NG feeding tube ;   Surgeon: Jaden Romo DO;  [facility];  Service: General   • LUMBAR PERITONEAL SHUNT  11/19/2015    Laparoscopic assisted   • NC BREAST REDUCTION Bilateral 3/25/2022    Procedure: BILATERAL BREAST REDUCTION;  Surgeon: Fuad Zacarias MD;  [facility];  Service: Plastics   • NC CREATE SHUNT:VENTRIC-ATRIAL Right 12/18/2019    Procedure: IMAGE GUIDED INSERTION OF RIGHT CORONAL VENTRICULAR-ATRIAL SHUNT;  Surgeon: Anoop Ledbetter MD;  [facility];  Service: Neurosurgery   • NC CREATE SHUNT:VENTRIC-PERITONEAL Right 5/31/2017    Procedure: IMAGE GUIDED CORONAL PLACEMENT OF PROGRAMABLE VENTRICULAR-PERITONEAL SHUNT, REMOVAL OF LP SHUNT ;  Surgeon: Anoop Ledbetter MD;  [facility];  Service: Neurosurgery   • NC CYSTO/URETERO W/LITHOTRIPSY &INDWELL STENT INSRT Bilateral 6/24/2020    Procedure: CYSTOSCOPY URETEROSCOPY WITH LITHOTRIPSY HOLMIUM LASER, RETROGRADE PYELOGRAM AND exchange bilateral STENTs URETERAL;  Surgeon: Kennedi Boyd MD;  [facility];  Service: Urology   • ND CYSTO/URETERO W/LITHOTRIPSY &INDWELL STENT INSRT Left 8/21/2021    Procedure: CYSTOSCOPY; LEFT URETEROSCOPY WITH RETROGRADE PYELOGRAM, REMOVAL OF STONE AND INSERTION LEFT URETERAL STENT;  Surgeon: Susana Mac MD;  [facility];  Service: Urology   • ND CYSTOURETHROSCOPY,URETER CATHETER N/A 6/6/2020    Procedure: Bilateral CYSTOSCOPY RETROGRADE PYELOGRAM WITH INSERTION STENT URETERAL;  Surgeon: Sobia Gibbs MD;  Location: [facility] MAIN OR;  Service: Urology   • ND EGD TRANSORAL BIOPSY SINGLE/MULTIPLE N/A 6/27/2018    Procedure: ESOPHAGOGASTRODUODENOSCOPY (EGD) with padlock clip placement;  Surgeon: Rosmery Kuhn MD;  [facility];   Service: Bariatrics   • ND REMOVAL,COMPLETE CSF SHUNT,W/O REPLACE Right 2/5/2018    Procedure: Removal of  shunt;  Surgeon: Kirit Malone MD;  [facility];  Service: Neurosurgery   • ND REPLACEMENT/REVISION,CSF SHUNT Right 1/25/2018    Procedure: Externalization of right-sided SHUNT VENTRICULAR-PERITONEAL in anterior chest wall ribs two and three level  ;  Surgeon: Daksha Molina MD;  [facility];  Service: Neurosurgery   • WRIST SURGERY Right     x3 2006, 2008       Current Outpatient Medications   Medication Sig Dispense Refill   • ascorbic acid (VITAMIN C) 250 MG CHEW Chew 250 mg daily     • Biotin 1 MG CAPS Take 1 mg by mouth daily       • calcium citrate-vitamin D (CITRACAL+D) 315-200 MG-UNIT per tablet Take 1 tablet by mouth 2 (two) times a day     • famotidine (PEPCID) 40 MG tablet Take 1 tablet (40 mg total) by mouth daily 30 tablet 2   • ferrous gluconate 324 (37 5 Fe) mg Take 324 mg by mouth 2 (two) times a day before meals     • folic acid (FOLVITE) 1 mg tablet Take 1 tablet (1 mg total) by mouth daily 90 tablet 4   • methylPREDNISolone 4 MG tablet therapy pack Use as directed on package 21 each 0   • Multiple Vitamin (MULTIVITAMIN) tablet Take 1 tablet by mouth daily Wears a patch     • Multiple Vitamins-Minerals (Hair/Skin/Nails) CAPS Take 1 tablet by mouth 2 (two) times a day       • nirmatrelvir & ritonavir (Paxlovid, 300/100,) tablet therapy pack Take 3 tablets by mouth 2 (two) times a day for 5 days Take 2 nirmatrelvir tablets + 1 ritonavir tablet together per dose 30 tablet 0   • omeprazole (PriLOSEC) 40 MG capsule Take 1 capsule (40 mg total) by mouth 2 (two) times a day 60 capsule 2   • ondansetron (ZOFRAN) 4 mg tablet Take 1 tablet (4 mg total) by mouth every 8 (eight) hours as needed for nausea or vomiting 20 tablet 0   • vitamin B-12 (VITAMIN B-12) 500 mcg tablet Take 500 mcg by mouth daily     • acetaminophen (TYLENOL) 500 mg tablet Take 1 tablet (500 mg total) by mouth every 6 (six) hours as needed for mild pain or moderate pain (Patient not taking: Reported on 9/26/2022) 30 tablet 2   • docusate sodium (COLACE) 100 mg capsule Take 1 capsule (100 mg total) by mouth 2 (two) times a day (Patient not taking: No sig reported) 20 capsule 2   • gabapentin (Neurontin) 300 mg capsule Take 1 capsule (300 mg total) by mouth 3 (three) times a day for 14 days (Patient not taking: No sig reported) 42 capsule 0   • linaCLOtide (Linzess) 145 MCG CAPS Take 1 capsule (145 mcg total) by mouth daily for 14 days 14 capsule 0   • NON FORMULARY  (Patient not taking: Reported on 9/26/2022)     • RABEprazole (ACIPHEX) 20 MG tablet Take 1 tablet (20 mg total) by mouth 2 (two) times a day (Patient not taking: Reported on 9/26/2022) 60 tablet 0   • TobraDex ophthalmic ointment APPLY TO BOTH EYES ONCE DAILY AT BEDTIME FOR 1 WEEK THEN STOP  No current facility-administered medications for this visit          Allergies   Allergen Reactions   • Benadryl [Diphenhydramine] Anaphylaxis     Throat closing   • Phenergan [Promethazine] Anaphylaxis       Review of Systems     Constitutional:  Denies fever or chills   Eyes:  Denies double , blurry vision or eye pain  HENT:  Denies nasal congestion or sore throat   Respiratory:  Denies cough or shortness of breath or wheezing  Cardiovascular:  Denies palpitations or chest pain  GI:  Denies abdominal pain,, no loose stools, no reflux  Integument:  Denies rash , no open areas  Neurologic:  Denies headache or focal weakness, no dizziness  : no dysuria, or hematuria      Video Exam    There were no vitals filed for this visit      Physical Exam     Constitutional:  Well developed, well nourished, no acute distress, non-toxic appearance   Eyes:  Is conjunctiva normal , non icteric sclera  HENT:  Atraumatic, non congested  Respiratory:  Nonlabored, appears comfortable, no conversational dyspnea  Cardiovascular:   no LE edema b/l  Neurologic:  no focal deficits noted   Psychiatric:  Speech and behavior appropriate , AAO x 3      I spent 2 min 45 sec minutes directly with the patient during this visit

## 2022-11-19 DIAGNOSIS — K21.9 GASTROESOPHAGEAL REFLUX DISEASE WITHOUT ESOPHAGITIS: ICD-10-CM

## 2022-11-20 RX ORDER — FAMOTIDINE 40 MG/1
TABLET, FILM COATED ORAL
Qty: 30 TABLET | Refills: 2 | Status: SHIPPED | OUTPATIENT
Start: 2022-11-20

## 2022-12-05 ENCOUNTER — TELEPHONE (OUTPATIENT)
Dept: OTHER | Facility: OTHER | Age: 47
End: 2022-12-05

## 2022-12-06 ENCOUNTER — OFFICE VISIT (OUTPATIENT)
Dept: GASTROENTEROLOGY | Facility: MEDICAL CENTER | Age: 47
End: 2022-12-06

## 2022-12-06 VITALS
SYSTOLIC BLOOD PRESSURE: 124 MMHG | WEIGHT: 140 LBS | BODY MASS INDEX: 24.8 KG/M2 | TEMPERATURE: 98.1 F | DIASTOLIC BLOOD PRESSURE: 75 MMHG | HEART RATE: 57 BPM | HEIGHT: 63 IN

## 2022-12-06 DIAGNOSIS — K59.04 CHRONIC IDIOPATHIC CONSTIPATION: ICD-10-CM

## 2022-12-06 DIAGNOSIS — K21.9 GASTROESOPHAGEAL REFLUX DISEASE WITHOUT ESOPHAGITIS: Primary | ICD-10-CM

## 2022-12-06 NOTE — PROGRESS NOTES
[facility] - Outpatient Follow-up Note  Chely Last 49 y o  female MRN: [id number]  Encounter: [id number]          ASSESSMENT AND PLAN:      1  Gastroesophageal reflux disease without esophagitis: hx of gastric sleeve complicated by staple line leak repaired with over the scope clip/padlock  She has significant acid reflux  Currently on prilosec 40mg daily, pepcid at bedtime  She feels like her symptoms are doing better as long as she sleeps on an incline and is strict with her diet  She also uses mint tea, samara seltzer and prakash chews that help her symptoms  Unfortunately not a candidate for TIF due to sleeve and STRETTA unable to be done due to padlock at the GEJ  States she was also told by Dr Gely Green he could not convert to full bypass given complication after her sleeve  -continue prilosec 40mg bid  -continue pepcid at bedtime  -elevate head of bed  -avoid eating within 2-3 hours of bedtime  -f/u 3 months time    2  Chronic idiopathic constipation: was given samples of linzess in the past  Now admits to resolution in her constipation with 1 cup of coffee daily    3  Colon cancer screening: never had colonoscopy, cologuard is pending    ______________________________________________________________________    SUBJECTIVE:  Joan Hugo is a 40-year-old female who is here for follow-up of acid reflux  She has a past medical history of gastric sleeve complicated by staple line leak that was fixed with a padlock clip     She had a complicated course in the hospital after her surgery  She is here for ongoing symptoms of heartburn  She states that she cannot drink regular water as this gives her significant heartburn  She has been drinking sparkling water, which is okay for her  She states that she has been very strict with her diet on an acid reflux diet and elevating the head of her bed     She is currently taking Prilosec 40 mg twice daily and Pepcid at bedtime    She states that as long as she sleeps elevated, she will not experience any nighttime symptoms and her medication seems to be working well for her  She has never had a colonoscopy in the past, Cologuard has been ordered for her      REVIEW OF SYSTEMS IS OTHERWISE NEGATIVE  Historical Information   Past Medical History:   Diagnosis Date   • Abdominal pain    • Acute cystitis with hematuria 1/3/2020   • Brain condition     Pseudotumor Cerebri    • Chronic kidney disease    • De Quervain's disease (tenosynovitis)    • Esophageal perforation    • Gastric leak    • GERD (gastroesophageal reflux disease)    • Headache    • Idiopathic intracranial hypertension    • Kidney stone    • Migraine    • No blood products     per pt: personal and Gnosticist beliefs  surgeon office aware 12/13/19   • Papilledema, both eyes    • PONV (postoperative nausea and vomiting)    • Postgastrectomy malabsorption    • Presence of lumboperitoneal shunt     Resolved: Sep 20, 2017   • Rotator cuff tendinitis     Resolved: Aug 23, 2017   • Visual field defect      Past Surgical History:   Procedure Laterality Date   • BREAST BIOPSY Left 1993    benign   • CSF SHUNT      LP shunt - 2015 -  shunt - 2017   • ESOPHAGOGASTRODUODENOSCOPY N/A 2/23/2018    Procedure: ESOPHAGOGASTRODUODENOSCOPY (EGD) WITH ESOPHAGEAL STENT PLACEMENT;  Surgeon: Katherine Patten MD;  [facility];  Service: Thoracic   • ESOPHAGOGASTRODUODENOSCOPY N/A 2/23/2018    Procedure: ESOPHAGOGASTRODUODENOSCOPY (EGD) WITH REMOVAL ESOPHAGEAL STENT  AND REPLACEMENT WITH 23mm X155mm [address];  Surgeon: Katherine Patten MD;  [facility];  Service: Thoracic   • ESOPHAGOGASTRODUODENOSCOPY N/A 3/28/2018    Procedure: ESOPHAGOGASTRODUODENOSCOPY (EGD) with PEJ placement ;  Surgeon: Vibha Gudino MD;  [facility];   Service: Gastroenterology   • ESOPHAGOGASTRODUODENOSCOPY N/A 4/5/2018    Procedure: ESOPHAGOGASTRODUODENOSCOPY (EGD) with botox injection and kaofed placement;  Surgeon: Ninfa Sanford DO;  [facility]; Service: Gastroenterology   • ESOPHAGOGASTRODUODENOSCOPY N/A 4/10/2018    Procedure: ESOPHAGOGASTRODUODENOSCOPY (EGD) with Kaofed placement;  Surgeon: Clarisse Mendoza MD;  [facility]; Service: Gastroenterology   • ESOPHAGOSCOPY WITH STENT INSERTION N/A 1/24/2018    Procedure: INSERTION STENT ESOPHAGEAL;  Surgeon: Jonah Barrett MD;  [facility]; Service: Gastroenterology   • EYE SURGERY Bilateral 1980    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)    • FL RETROGRADE PYELOGRAM  6/24/2020   • FL RETROGRADE PYELOGRAM  8/21/2021   • GASTRIC BYPASS  12/19/2016    Lap sleeve gastrectomy w/shunt length shortening procedure   • HIATAL HERNIA REPAIR     • HYSTERECTOMY  03/14/2013   • IR LUMBAR PUNCTURE  8/29/2018   • LAPAROTOMY N/A 1/25/2018    Procedure: Exploratory Laparotomy, wash out,placement of drains, placement of NG feeding tube ;   Surgeon: Kandice Brunner, DO;  [facility];  Service: General   • LUMBAR PERITONEAL SHUNT  11/19/2015    Laparoscopic assisted   • AR BREAST REDUCTION Bilateral 3/25/2022    Procedure: BILATERAL BREAST REDUCTION;  Surgeon: Rusty Buchanan MD;  [facility];  Service: Plastics   • AR CREATE SHUNT:VENTRIC-ATRIAL Right 12/18/2019    Procedure: IMAGE GUIDED INSERTION OF RIGHT CORONAL VENTRICULAR-ATRIAL SHUNT;  Surgeon: Josie Duff MD;  [facility];  Service: Neurosurgery   • AR CREATE SHUNT:VENTRIC-PERITONEAL Right 5/31/2017    Procedure: IMAGE GUIDED CORONAL PLACEMENT OF PROGRAMABLE VENTRICULAR-PERITONEAL SHUNT, REMOVAL OF LP SHUNT ;  Surgeon: Josie Duff MD;  [facility];  Service: Neurosurgery   • AR CYSTO/URETERO W/LITHOTRIPSY &INDWELL STENT INSRT Bilateral 6/24/2020    Procedure: CYSTOSCOPY URETEROSCOPY WITH LITHOTRIPSY HOLMIUM LASER, RETROGRADE PYELOGRAM AND exchange bilateral  STENTs URETERAL;  Surgeon: Gwyn Lopez MD;  [facility]; Service: Urology   • GA CYSTO/URETERO W/LITHOTRIPSY &INDWELL STENT INSRT Left 8/21/2021    Procedure: CYSTOSCOPY; LEFT URETEROSCOPY WITH RETROGRADE PYELOGRAM, REMOVAL OF STONE AND INSERTION LEFT URETERAL STENT;  Surgeon: Daniele Gonzales MD;  [facility];  Service: Urology   • GA CYSTOURETHROSCOPY,URETER CATHETER N/A 6/6/2020    Procedure: Bilateral CYSTOSCOPY RETROGRADE PYELOGRAM WITH INSERTION STENT URETERAL;  Surgeon: Estuardo Patel MD;  [facility] MA[facility] OR;  Service: Urology   • GA EGD TRANSORAL BIOPSY SINGLE/MULTIPLE N/A 6/27/2018    Procedure: ESOPHAGOGASTRODUODENOSCOPY (EGD) with padlock clip placement;  Surgeon: Barrie Juarez MD;  [facility];   Service: Bariatrics   • GA REMOVAL,COMPLETE CSF SHUNT,W/O REPLACE Right 2/5/2018    Procedure: Removal of  shunt;  Surgeon: Lizbeth Cooley MD;  [facility];  Service: Neurosurgery   • GA REPLACEMENT/REVISION,CSF SHUNT Right 1/25/2018    Procedure: Externalization of right-sided SHUNT VENTRICULAR-PERITONEAL in anterior chest wall ribs two and three level  ;  Surgeon: Joan Worthington MD;  [facility];  Service: Neurosurgery   • WRIST SURGERY Right     x3 2006, 2008     Social History   Social History     Substance and Sexual Activity   Alcohol Use Never     Social History     Substance and Sexual Activity   Drug Use Yes   • Frequency: 7 0 times per week   • Types: Marijuana    Comment: medical marijuana, vaping & topical      Social History     Tobacco Use   Smoking Status Former   • Packs/day: 0 50   • Years: 20 00   • Pack years: 10 00   • Types: Cigarettes   • Quit date: 8/24/2012   • Years since quitting: 10 2   Smokeless Tobacco Never     Family History   Problem Relation Age of Onset   • No Known Problems Mother    • No Known Problems Father    • Thyroid cancer Brother 36   • Colon cancer Maternal Grandfather 44   • No Known Problems Paternal Grandmother    • Cancer Paternal Grandfather    • Cancer Family         Gastric • Leukemia Family    • Colon cancer Family    • Pancreatic cancer Family    • No Known Problems Sister    • No Known Problems Daughter    • No Known Problems Maternal Grandmother    • No Known Problems Son    • No Known Problems Maternal Aunt    • No Known Problems Maternal Aunt    • No Known Problems Paternal Aunt        Meds/Allergies       Current Outpatient Medications:   •  ascorbic acid (VITAMIN C) 250 MG CHEW  •  Biotin 1 MG CAPS  •  calcium citrate-vitamin D (CITRACAL+D) 315-200 MG-UNIT per tablet  •  famotidine (PEPCID) 40 MG tablet  •  ferrous gluconate 324 (69 3 Fe) mg  •  folic acid (FOLVITE) 1 mg tablet  •  Multiple Vitamin (MULTIVITAMIN) tablet  •  Multiple Vitamins-Minerals (Hair/Skin/Nails) CAPS  •  omeprazole (PriLOSEC) 40 MG capsule  •  ondansetron (ZOFRAN) 4 mg tablet  •  TobraDex ophthalmic ointment  •  vitamin B-12 (VITAMIN B-12) 500 mcg tablet  •  acetaminophen (TYLENOL) 500 mg tablet  •  docusate sodium (COLACE) 100 mg capsule  •  gabapentin (Neurontin) 300 mg capsule  •  linaCLOtide (Linzess) 145 MCG CAPS  •  methylPREDNISolone 4 MG tablet therapy pack  •  NON FORMULARY  •  RABEprazole (ACIPHEX) 20 MG tablet    Allergies   Allergen Reactions   • Benadryl [Diphenhydramine] Anaphylaxis     Throat closing   • Phenergan [Promethazine] Anaphylaxis           Objective     Blood pressure 124/75, pulse 57, temperature 98 1 °F (36 7 °C), temperature source Tympanic, height 5' 3" (1 6 m), weight 63 5 kg (140 lb), not currently breastfeeding  Body mass index is 24 8 kg/m²  PHYSICAL EXAM:      General Appearance:   Alert, cooperative, no distress   HEENT:   Normocephalic, atraumatic, anicteric      Neck:  Supple, symmetrical, trachea midline   Lungs:   Clear to auscultation bilaterally; no rales, rhonchi or wheezing; respirations unlabored    Heart[de-identified]   Regular rate and rhythm; no murmur, rub, or gallop     Abdomen:   Soft, non-tender, non-distended; normal bowel sounds; no masses, no organomegaly  Genitalia:   Deferred    Rectal:   Deferred    Extremities:  No cyanosis, clubbing or edema        Skin:  No jaundice, rashes, or lesions          Lab Results:   No visits with results within 1 Day(s) from this visit  Latest known visit with results is:   Hospital Outpatient Visit on 11/09/2022   Component Date Value   • WBC 11/09/2022 3 53 (L)    • RBC 11/09/2022 5 04    • Hemoglobin 11/09/2022 15 5 (H)    • Hematocrit 11/09/2022 46 7 (H)    • MCV 11/09/2022 93    • MCH 11/09/2022 30 8    • MCHC 11/09/2022 33 2    • RDW 11/09/2022 13 2    • MPV 11/09/2022 9 5    • Platelets 53/88/8298 143 (L)    • nRBC 11/09/2022 0    • Neutrophils Relative 11/09/2022 59    • Immat GRANS % 11/09/2022 1    • Lymphocytes Relative 11/09/2022 24    • Monocytes Relative 11/09/2022 11    • Eosinophils Relative 11/09/2022 4    • Basophils Relative 11/09/2022 1    • Neutrophils Absolute 11/09/2022 2 09    • Immature Grans Absolute 11/09/2022 0 04    • Lymphocytes Absolute 11/09/2022 0 86    • Monocytes Absolute 11/09/2022 0 38    • Eosinophils Absolute 11/09/2022 0 13    • Basophils Absolute 11/09/2022 0 03    • Sodium 11/09/2022 139    • Potassium 11/09/2022 3 6    • Chloride 11/09/2022 104    • CO2 11/09/2022 29    • ANION GAP 11/09/2022 6    • BUN 11/09/2022 19    • Creatinine 11/09/2022 0 58 (L)    • Glucose 11/09/2022 88    • Calcium 11/09/2022 8 9    • AST 11/09/2022 20    • ALT 11/09/2022 20    • Alkaline Phosphatase 11/09/2022 53    • Total Protein 11/09/2022 6 5    • Albumin 11/09/2022 4 0    • Total Bilirubin 11/09/2022 0 43    • eGFR 11/09/2022 109    • CRP 11/09/2022 1 1    • Sed Rate 11/09/2022 2    • Vitamin B-12 11/09/2022 300    • Folate 11/09/2022 8 1    • Iron Saturation 11/09/2022 30    • TIBC 11/09/2022 270    • Iron 11/09/2022 80    • Ferritin 11/09/2022 123          Radiology Results:   No results found

## 2022-12-07 ENCOUNTER — APPOINTMENT (OUTPATIENT)
Dept: LAB | Facility: HOSPITAL | Age: 47
End: 2022-12-07

## 2022-12-07 DIAGNOSIS — K91.2 POSTSURGICAL MALABSORPTION: ICD-10-CM

## 2022-12-07 DIAGNOSIS — Z48.815 ENCOUNTER FOR SURGICAL AFTERCARE FOLLOWING SURGERY OF DIGESTIVE SYSTEM: ICD-10-CM

## 2022-12-07 DIAGNOSIS — Z98.84 BARIATRIC SURGERY STATUS: ICD-10-CM

## 2022-12-07 LAB — PTH-INTACT SERPL-MCNC: 77.3 PG/ML (ref 18.4–80.1)

## 2022-12-08 ENCOUNTER — TELEPHONE (OUTPATIENT)
Dept: HEMATOLOGY ONCOLOGY | Facility: CLINIC | Age: 47
End: 2022-12-08

## 2022-12-08 NOTE — TELEPHONE ENCOUNTER
Appointment Cancellation Or Reschedule     Person calling In self   If other than patient calling, are they listed on the communication consent form? self   Provider [facility] Visit Date and Time 12/8 2:30PM   Office Visit Location [facility]   Did patient want to reschedule their office appointment? If so, when was it scheduled to? Yes, 2/9 2:30PM   Did you have STAR scheduled for this appointment? no   Do you need STAR set up for your new appointment? If yes, please send to "PATIENT RIDESHARE" pool for STAR rescheduling no   If you are cancelling appointment, can we notify STAR to cancel ride? If yes, please send to "PATIENT RIDESHARE" pool for STAR to cancel service no   Is this patient calling to reschedule an infusion appointment? no   When is their next infusion appointment? 2/1 2PM   Is this patient a Chemo patient? no   Reason for Cancellation or Reschedule Traffic stuck     If the patient is a treatment patient, please route this to the office nurse  If the patient is not on treatment, please route to the office MA  If the patient is a surgical oncology patient, please route to surg/onc clinical pool

## 2022-12-09 LAB — VIT B1 BLD-SCNC: 137.5 NMOL/L (ref 66.5–200)

## 2022-12-10 LAB — ZINC SERPL-MCNC: 60 UG/DL (ref 44–115)

## 2022-12-12 LAB — VIT A SERPL-MCNC: 34.7 UG/DL (ref 20.1–62)

## 2022-12-19 ENCOUNTER — OFFICE VISIT (OUTPATIENT)
Dept: INTERNAL MEDICINE CLINIC | Facility: OTHER | Age: 47
End: 2022-12-19

## 2022-12-19 VITALS
HEART RATE: 56 BPM | SYSTOLIC BLOOD PRESSURE: 134 MMHG | OXYGEN SATURATION: 98 % | WEIGHT: 144.4 LBS | DIASTOLIC BLOOD PRESSURE: 82 MMHG | HEIGHT: 63 IN | BODY MASS INDEX: 25.59 KG/M2 | TEMPERATURE: 97.3 F

## 2022-12-19 DIAGNOSIS — Z12.31 ENCOUNTER FOR SCREENING MAMMOGRAM FOR MALIGNANT NEOPLASM OF BREAST: Primary | ICD-10-CM

## 2022-12-19 DIAGNOSIS — E78.00 HYPERCHOLESTEREMIA: ICD-10-CM

## 2022-12-19 DIAGNOSIS — Z00.00 ANNUAL PHYSICAL EXAM: ICD-10-CM

## 2022-12-19 PROBLEM — Z01.818 PREOPERATIVE CLEARANCE: Status: RESOLVED | Noted: 2018-08-24 | Resolved: 2022-12-19

## 2022-12-19 NOTE — PROGRESS NOTES
Assessment/Plan:    1  Encounter for screening mammogram for malignant neoplasm of breast  -     Mammo screening bilateral w 3d & cad; Future; Expected date: 12/19/2022    2  Annual physical exam  -     TSH, 3rd generation with Free T4 reflex; Future    3  Hypercholesteremia  -     Lipid Panel with Direct LDL reflex; Future            There are no Patient Instructions on file for this visit  Return for Annual physical     Subjective:      Patient ID: Bradley Barrera is a 49 y o  female  Chief Complaint   Patient presents with   • Physical Exam     Annual  Pt  States eyes water all the time  Prescribed eye drops do not help  Pt  Has cologuard kit at home, will complete        HPI      Adult Annual Physical   Patient here for a comprehensive physical exam  The patient reports no problems  Diet and Physical Activity  · Diet/Nutrition: well balanced diet  · Exercise: vigorous cardiovascular exercise  Depression Screening  PHQ-9 Depression Screening    PHQ-9:    Frequency of the following problems over the past two weeks:            General Health  · Sleep: sleeps well  · Hearing: normal - bilateral   · Vision: no vision problems  · Dental: regular dental visits         /GYN Health  · Patient is: postmenopausal  · Last menstrual period: TAH, 2012, pt has her ovaries, due to heavy periods  · Contraceptive method: RAMO   · mammo march 2022 normal  · PAP over due, last was 4 yrs ago, normal   · [place]= pt has cologuard at home   ·     The following portions of the patient's history were reviewed and updated as appropriate: allergies, current medications, past family history, past medical history, past social history, past surgical history and problem list     Review of Systems      Constitutional:  Denies fever or chills   Eyes:  Denies double , blurry vision or eye pain  HENT:  Denies nasal congestion or sore throat   Respiratory:  Denies cough or shortness of breath or wheezing  Cardiovascular:  Denies palpitations or chest pain  GI:  Denies abdominal pain, nausea, or vomiting, no loose stools, no reflux  Integument:  Denies rash , no open areas  Neurologic:  Denies headache or focal weakness, no dizziness  : no dysuria, or hematuria,+ urine frequency       Current Outpatient Medications   Medication Sig Dispense Refill   • ascorbic acid (VITAMIN C) 250 MG CHEW Chew 250 mg daily     • Biotin 1 MG CAPS Take 1 mg by mouth daily       • calcium citrate-vitamin D (CITRACAL+D) 315-200 MG-UNIT per tablet Take 1 tablet by mouth 2 (two) times a day     • famotidine (PEPCID) 40 MG tablet take 1 tablet by mouth once daily 30 tablet 2   • ferrous gluconate 324 (37 5 Fe) mg Take 324 mg by mouth 2 (two) times a day before meals     • folic acid (FOLVITE) 1 mg tablet Take 1 tablet (1 mg total) by mouth daily 90 tablet 4   • Multiple Vitamin (MULTIVITAMIN) tablet Take 1 tablet by mouth daily Wears a patch     • Multiple Vitamins-Minerals (Hair/Skin/Nails) CAPS Take 1 tablet by mouth 2 (two) times a day       • omeprazole (PriLOSEC) 40 MG capsule Take 1 capsule (40 mg total) by mouth 2 (two) times a day 60 capsule 2   • vitamin B-12 (VITAMIN B-12) 500 mcg tablet Take 500 mcg by mouth daily       No current facility-administered medications for this visit         Objective:    /82 (BP Location: Left arm, Patient Position: Sitting, Cuff Size: Standard)   Pulse 56   Temp (!) 97 3 °F (36 3 °C) (Temporal)   Ht 5' 3" (1 6 m)   Wt 65 5 kg (144 lb 6 4 oz)   LMP  (LMP Unknown)   SpO2 98%   BMI 25 58 kg/m²        Physical Exam       Constitutional:  Well developed, well nourished, no acute distress, non-toxic appearance   Eyes:  PERRL, conjunctiva normal , non icteric sclera  HENT:  Atraumatic, oropharynx moist  Neck-  supple   Respiratory:  CTA b/l, normal breath sounds, no rales, no wheezing   Cardiovascular:  RRR, no murmurs, no LE edema b/l  GI:  Soft, nondistended, normal bowel sounds x 4, nontender, no organomegaly, no mass, no rebound, no guarding   Neurologic:  no focal deficits noted   Psychiatric:  Speech and behavior appropriate , AAO x 3  '      Haydee Hyman, DO

## 2023-01-02 DIAGNOSIS — K21.9 GASTROESOPHAGEAL REFLUX DISEASE, UNSPECIFIED WHETHER ESOPHAGITIS PRESENT: ICD-10-CM

## 2023-01-02 RX ORDER — OMEPRAZOLE 40 MG/1
CAPSULE, DELAYED RELEASE ORAL
Qty: 60 CAPSULE | Refills: 2 | Status: SHIPPED | OUTPATIENT
Start: 2023-01-02

## 2023-01-05 ENCOUNTER — TELEPHONE (OUTPATIENT)
Dept: NEUROLOGY | Facility: CLINIC | Age: 48
End: 2023-01-05

## 2023-01-05 NOTE — TELEPHONE ENCOUNTER
Per work que, [employer] PA  22 and pt next scheduled for       Called pt, she has seen improvement since starting vyepti  Intensity and frequency has decreased since starting     PA form completed and faxed along with last office note, today's encounter and encounter from  noting why pt can't take nurtec and aimovig    PA form Placed at  to to be scanned into chart

## 2023-02-01 ENCOUNTER — HOSPITAL ENCOUNTER (OUTPATIENT)
Dept: INFUSION CENTER | Facility: HOSPITAL | Age: 48
Discharge: HOME | End: 2023-02-01
Attending: PSYCHIATRY & NEUROLOGY

## 2023-02-01 VITALS
RESPIRATION RATE: 16 BRPM | TEMPERATURE: 98.6 F | SYSTOLIC BLOOD PRESSURE: 152 MMHG | HEART RATE: 68 BPM | DIASTOLIC BLOOD PRESSURE: 80 MMHG

## 2023-02-01 DIAGNOSIS — G43.109 MIGRAINE WITH AURA AND WITHOUT STATUS MIGRAINOSUS, NOT INTRACTABLE: Primary | ICD-10-CM

## 2023-02-01 RX ORDER — SODIUM CHLORIDE 9 MG/ML
20 INJECTION, SOLUTION INTRAVENOUS ONCE
OUTPATIENT
Start: 2023-04-26

## 2023-02-01 RX ORDER — SODIUM CHLORIDE 9 MG/ML
20 INJECTION, SOLUTION INTRAVENOUS ONCE
Status: COMPLETED | OUTPATIENT
Start: 2023-02-01 | End: 2023-02-01

## 2023-02-01 RX ADMIN — SODIUM CHLORIDE 20 ML/HR: 0.9 INJECTION, SOLUTION INTRAVENOUS at 14:18

## 2023-02-01 RX ADMIN — EPTINEZUMAB-JJMR 100 MG: 100 INJECTION INTRAVENOUS at 14:56

## 2023-02-01 NOTE — PROGRESS NOTES
Pt tolerated todays vyepti without incident  Peripheral iv discontinued jelco intact  Discharged ambulatory with next appt

## 2023-02-09 ENCOUNTER — OFFICE VISIT (OUTPATIENT)
Dept: HEMATOLOGY ONCOLOGY | Facility: CLINIC | Age: 48
End: 2023-02-09

## 2023-02-09 VITALS
SYSTOLIC BLOOD PRESSURE: 140 MMHG | TEMPERATURE: 98.1 F | OXYGEN SATURATION: 99 % | HEIGHT: 63 IN | WEIGHT: 150 LBS | HEART RATE: 58 BPM | BODY MASS INDEX: 26.58 KG/M2 | DIASTOLIC BLOOD PRESSURE: 80 MMHG | RESPIRATION RATE: 18 BRPM

## 2023-02-09 DIAGNOSIS — D69.6 THROMBOCYTOPENIA (HCC): ICD-10-CM

## 2023-02-09 DIAGNOSIS — D75.1 ERYTHROCYTOSIS: ICD-10-CM

## 2023-02-09 DIAGNOSIS — D50.8 IRON DEFICIENCY ANEMIA SECONDARY TO INADEQUATE DIETARY IRON INTAKE: Primary | ICD-10-CM

## 2023-02-09 DIAGNOSIS — E53.8 FOLIC ACID DEFICIENCY: ICD-10-CM

## 2023-02-09 DIAGNOSIS — D72.818 OTHER DECREASED WHITE BLOOD CELL (WBC) COUNT: ICD-10-CM

## 2023-02-09 DIAGNOSIS — E53.8 B12 DEFICIENCY: ICD-10-CM

## 2023-02-09 RX ORDER — FOLIC ACID 1 MG/1
2 TABLET ORAL DAILY
Qty: 180 TABLET | Refills: 3 | Status: SHIPPED | OUTPATIENT
Start: 2023-02-09

## 2023-02-09 NOTE — PROGRESS NOTES
Hematology/Oncology Outpatient Follow-up  Balaji Rosario 49 y o  female 1975 [id number]    Date:  2/9/2023      Assessment and Plan:  1  Iron deficiency anemia secondary to inadequate dietary iron intake  Patient is not currently anemic or iron deficient  She completed another course of IV iron Venofer x4 treatments August 2022  Etiology of her iron deficiency is likely malabsorption from her prior bariatric surgery/reflux  We will continue to monitor her and her laboratory studies on a regular basis  For now she follow-up again with repeat labs in 4 months from now or sooner should the need arise     - CBC and differential; Future  - Comprehensive metabolic panel; Future  - C-reactive protein; Future  - Sedimentation rate, automated; Future  - Vitamin B12; Future  - Iron Panel (Includes Ferritin, Iron Sat%, Iron, and TIBC); Future  - Ferritin; Future  - LD,Blood; Future  - Folate; Future    2  B12 deficiency  Despite taking sublingual vitamin B12 her B12 continues to be less than 400  Did ask her to check to see what dosage she is currently taking should be taking at least 1000 mcg daily  We did discuss starting B12 injections at least every few months which would help maintain her levels greater than 400  She did agree to this but would like to get the B12 injections every 3 months along with her IV infusions for her migraines  - Vitamin B12; Future    3  Folic acid deficiency  If like taking folic acid supplements daily her folic acid continues to be less than 10  Recommended she increase the supplements to 2 tabs daily     - Folate; Future  - folic acid (FOLVITE) 1 mg tablet; Take 2 tablets (2 mg total) by mouth daily  Dispense: 180 tablet; Refill: 3    4  Erythrocytosis  Patient most recent CBC showed mild erythrocytosis which occurred on 1 other occasion    She did have slightly carboxyhemoglobin in the past   We will continue to monitor     - CBC and differential; Future  - Comprehensive metabolic panel; Future  - C-reactive protein; Future  - Sedimentation rate, automated; Future  - LD,Blood; Future  - Erythropoietin; Future  - Carboxyhemoglobin; Future    5  Other decreased white blood cell (WBC) count  Patient's most recent CBC showed mild leukopenia and mild pancytopenia which is new for her  Was drawn around the time when she had COVID-19 which is the likely reason  We will repeat her CBC before her next office visit and continue to monitor closely  Additional work-up could be pursued if it persists or worsens     - CBC and differential; Future    6  Thrombocytopenia (HCC)  - CBC and differential; Future      HPI:  Patient is a pleasant 79-year-old female with complicated past medical history who presented originally for further evaluation and treatment of her microcytic anemia/iron deficiency   She had gastric sleeve surgery 2016 followed by laparoscopic paraesophageal hernia repair RT 3160 which later resulted in GE junction leak/fistula requiring open repair and lead to sepsis/inpatient hospitalization for 5 months   She also was diagnosed with pseudotumor cerebri in 2015 had  shunt placed which had to be removed with her sepsis and later had [facility] shunt placed   She has  PTSD as a result of her above-mentioned events, kidney stones and migraines   She does have a neurologist and gastroenterologist who is monitoring her       Her laboratory studies from 08/22/2021 showed microcytic hypochromic anemia H&H 8 6/30, MCV 77, MCH 22 1 her platelet count and white cells or normal   BMP normal   She had significant iron deficiency iron saturation 5%, TIBC 414, serum iron 22 with ferritin 2  Her iron deficiency was corrected with IV iron Venofer x6 treatments November/December 2021      Interval history:  Patient presents today for a follow-up visit  She received another course of IV Venofer x4 treatments August 2022 and states that she did feel somewhat better afterward    She mentions that she did recently undergo a breast reduction which has been helping with her chronic back/neck issues  She denies any obvious bleeding from any site  Continues to have significant reflux  She has been receiving an IV infusion every 3 months for her migraines under the care of her neurology team   She states that she is taking sublingual B12 daily along with her folic acid supplement daily  She did have COVID-19 11/10/2023 and states that it takes quite some time for her to recover  Her most recent laboratory studies from 11/9/2022 which were drawn 1 day before she found out she had COVID WBC was decreased 3 53 with normal white cell differential and no obvious immature cells in the peripheral blood she was not anemic H&H just slightly higher than average 15 5/46 7, MCV 93 platelet count was mildly decreased 143  CMP is normal   Inflammatory markers were normal   Her folic acid is still lower than average 8 1 vitamin B12 is not at goal of 300  She was not iron deficient iron saturation 30%, ferritin 123  ROS: Review of Systems   Constitutional: Positive for fatigue  Negative for activity change, appetite change, chills and fever  HENT: Positive for hearing loss  Negative for congestion, mouth sores, nosebleeds, sore throat and trouble swallowing  Eyes: Negative  Respiratory: Negative for cough, chest tightness and shortness of breath  Cardiovascular: Negative for chest pain, palpitations and leg swelling  Gastrointestinal: Positive for constipation, nausea and vomiting  Negative for abdominal distention, abdominal pain, blood in stool and diarrhea  Genitourinary: Negative for difficulty urinating, dysuria and hematuria  Musculoskeletal: Positive for myalgias  Negative for arthralgias, back pain, gait problem and joint swelling  Skin: Negative for color change, pallor and rash  Neurological: Positive for dizziness and headaches  Negative for weakness, light-headedness and numbness  Hematological: Negative for adenopathy  Does not bruise/bleed easily  Psychiatric/Behavioral: Negative for dysphoric mood and sleep disturbance  The patient is not nervous/anxious  Past Medical History:   Diagnosis Date   • Abdominal pain    • Acute cystitis with hematuria 1/3/2020   • Brain condition     Pseudotumor Cerebri    • Chronic kidney disease    • De Quervain's disease (tenosynovitis)    • Esophageal perforation    • Gastric leak    • GERD (gastroesophageal reflux disease)    • Headache    • Idiopathic intracranial hypertension    • Kidney stone    • Migraine    • No blood products     per pt: personal and Voodoo beliefs  surgeon office aware 12/13/19   • Papilledema, both eyes    • PONV (postoperative nausea and vomiting)    • Postgastrectomy malabsorption    • Presence of lumboperitoneal shunt     Resolved: Sep 20, 2017   • Rotator cuff tendinitis     Resolved: Aug 23, 2017   • Visual field defect        Past Surgical History:   Procedure Laterality Date   • BREAST BIOPSY Left 1993    benign   • CSF SHUNT      LP shunt - 2015 -  shunt - 2017   • ESOPHAGOGASTRODUODENOSCOPY N/A 2/23/2018    Procedure: ESOPHAGOGASTRODUODENOSCOPY (EGD) WITH ESOPHAGEAL STENT PLACEMENT;  Surgeon: Vidal Morris MD;  [facility];  Service: Thoracic   • ESOPHAGOGASTRODUODENOSCOPY N/A 2/23/2018    Procedure: ESOPHAGOGASTRODUODENOSCOPY (EGD) WITH REMOVAL ESOPHAGEAL STENT  AND REPLACEMENT WITH 23mm X155mm 1111 [address] [place] [address],[address];  Surgeon: Vidal Morris MD;  [facility];  Service: Thoracic   • ESOPHAGOGASTRODUODENOSCOPY N/A 3/28/2018    Procedure: ESOPHAGOGASTRODUODENOSCOPY (EGD) with PEJ placement ;  Surgeon: Jose A Sanon MD;  [facility]; Service: Gastroenterology   • ESOPHAGOGASTRODUODENOSCOPY N/A 4/5/2018    Procedure: ESOPHAGOGASTRODUODENOSCOPY (EGD) with botox injection and kaofed placement;  Surgeon: Keysha David DO;  [facility];   Service: Gastroenterology   • ESOPHAGOGASTRODUODENOSCOPY N/A 4/10/2018    Procedure: ESOPHAGOGASTRODUODENOSCOPY (EGD) with Kaofed placement;  Surgeon: Enrique Gonzalez MD;  [facility]; Service: Gastroenterology   • ESOPHAGOSCOPY WITH STENT INSERTION N/A 1/24/2018    Procedure: INSERTION STENT ESOPHAGEAL;  Surgeon: Deanna Ma MD;  [facility]; Service: Gastroenterology   • EYE SURGERY Bilateral 1980    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)    • FL RETROGRADE PYELOGRAM  6/24/2020   • FL RETROGRADE PYELOGRAM  8/21/2021   • GASTRIC BYPASS  12/19/2016    Lap sleeve gastrectomy w/shunt length shortening procedure   • HIATAL HERNIA REPAIR     • HYSTERECTOMY  03/14/2013   • IR LUMBAR PUNCTURE  8/29/2018   • LAPAROTOMY N/A 1/25/2018    Procedure: Exploratory Laparotomy, wash out,placement of drains, placement of NG feeding tube ;   Surgeon: Abigail Garay DO;  [facility];  Service: General   • LUMBAR PERITONEAL SHUNT  11/19/2015    Laparoscopic assisted   • NV BREAST REDUCTION Bilateral 3/25/2022    Procedure: BILATERAL BREAST REDUCTION;  Surgeon: Inder De La Paz MD;  [facility];  Service: Plastics   • NV CRTJ SHUNT [id number] Right 12/18/2019    Procedure: IMAGE GUIDED INSERTION OF RIGHT CORONAL VENTRICULAR-ATRIAL SHUNT;  Surgeon: Macy Saint, MD;  [facility];  Service: Neurosurgery   • NV CRTJ SHUNT [id number] TERMINUS Right 5/31/2017    Procedure: IMAGE GUIDED CORONAL PLACEMENT OF PROGRAMABLE VENTRICULAR-PERITONEAL SHUNT, REMOVAL OF LP SHUNT ;  Surgeon: Macy Saint, MD;  [facility];  Service: Neurosurgery   • NV CYSTO BLADDER W/URETERAL CATHETERIZATION N/A 6/6/2020    Procedure: Bilateral CYSTOSCOPY RETROGRADE PYELOGRAM WITH INSERTION STENT URETERAL;  Surgeon: Dante Tamayo MD;  Location: [address] MAIN OR;  Service: Urology   • NV CYSTO/URETERO W/LITHOTRIPSY &INDWELL STENT INSRT Bilateral 6/24/2020    Procedure: CYSTOSCOPY URETEROSCOPY WITH LITHOTRIPSY HOLMIUM LASER, RETROGRADE PYELOGRAM AND exchange bilateral  STENTs URETERAL;  Surgeon: Ericka Culp MD;  [facility];  Service: Urology   • CA CYSTO/URETERO W/LITHOTRIPSY &INDWELL STENT INSRT Left 8/21/2021    Procedure: CYSTOSCOPY; LEFT URETEROSCOPY WITH RETROGRADE PYELOGRAM, REMOVAL OF STONE AND INSERTION LEFT URETERAL STENT;  Surgeon: Florencio Springer MD;  [facility];  Service: Urology   • CA EGD TRANSORAL BIOPSY SINGLE/MULTIPLE N/A 6/27/2018    Procedure: ESOPHAGOGASTRODUODENOSCOPY (EGD) with padlock clip placement;  Surgeon: Ladonna Bledsoe MD;  [facility];   Service: Bariatrics   • CA RMVL COMPL CSF SHUNT SYSTEM W/O RPLCMT SHUNT Right 2/5/2018    Procedure: Removal of  shunt;  Surgeon: William Leahy MD;  [facility];  Service: Neurosurgery   • CA RPLCMT/REVJ CSF SHUNT VALVE/CATH SHUNT SYS Right 1/25/2018    Procedure: Externalization of right-sided SHUNT VENTRICULAR-PERITONEAL in anterior chest wall ribs two and three level  ;  Surgeon: Wilbur Maier MD;  [facility];  Service: Neurosurgery   • WRIST SURGERY Right     x3 2006, 2008       Social History     Socioeconomic History   • Marital status: Single     Spouse name: None   • Number of children: 2   • Years of education: High School or GED    • Highest education level: None   Occupational History   • Occupation: employed    Tobacco Use   • Smoking status: Former     Packs/day: 0 50     Years: 20 00     Pack years: 10 00     Types: Cigarettes     Quit date: 8/24/2012     Years since quitting: 10 4   • Smokeless tobacco: Never   Vaping Use   • Vaping Use: Every day   • Substances: THC, CBD   Substance and Sexual Activity   • Alcohol use: Never   • Drug use: Yes     Frequency: 7 0 times per week     Types: Marijuana     Comment: medical marijuana, vaping & topical    • Sexual activity: Yes   Other Topics Concern   • None   Social History Narrative    ** Merged History Encounter ** Social Determinants of Health     Financial Resource Strain: Not on file   Food Insecurity: Not on file   Transportation Needs: Not on file   Physical Activity: Not on file   Stress: Not on file   Social Connections: Not on file   Intimate Partner Violence: Not on file   Housing Stability: Not on file       Family History   Problem Relation Age of Onset   • No Known Problems Mother    • No Known Problems Father    • Thyroid cancer Brother 36   • Colon cancer Maternal Grandfather 39   • No Known Problems Paternal Grandmother    • Cancer Paternal Grandfather    • Cancer Family         Gastric   • Leukemia Family    • Colon cancer Family    • Pancreatic cancer Family    • No Known Problems Sister    • No Known Problems Daughter    • No Known Problems Maternal Grandmother    • No Known Problems Son    • No Known Problems Maternal Aunt    • No Known Problems Maternal Aunt    • No Known Problems Paternal Aunt        Allergies   Allergen Reactions   • Benadryl [Diphenhydramine] Anaphylaxis     Throat closing   • Phenergan [Promethazine] Anaphylaxis         Current Outpatient Medications:   •  ascorbic acid (VITAMIN C) 250 MG CHEW, Chew 250 mg daily, Disp: , Rfl:   •  Biotin 1 MG CAPS, Take 1 mg by mouth daily  , Disp: , Rfl:   •  calcium citrate-vitamin D (CITRACAL+D) 315-200 MG-UNIT per tablet, Take 1 tablet by mouth 2 (two) times a day, Disp: , Rfl:   •  famotidine (PEPCID) 40 MG tablet, take 1 tablet by mouth once daily, Disp: 30 tablet, Rfl: 2  •  ferrous gluconate 324 (37 5 Fe) mg, Take 324 mg by mouth 2 (two) times a day before meals, Disp: , Rfl:   •  folic acid (FOLVITE) 1 mg tablet, Take 2 tablets (2 mg total) by mouth daily, Disp: 180 tablet, Rfl: 3  •  Multiple Vitamin (MULTIVITAMIN) tablet, Take 1 tablet by mouth daily Wears a patch, Disp: , Rfl:   •  Multiple Vitamins-Minerals (Hair/Skin/Nails) CAPS, Take 1 tablet by mouth 2 (two) times a day  , Disp: , Rfl:   •  omeprazole (PriLOSEC) 40 MG capsule, take 1 capsule by mouth twice a day, Disp: 60 capsule, Rfl: 2  •  vitamin B-12 (VITAMIN B-12) 500 mcg tablet, Take 500 mcg by mouth daily, Disp: , Rfl:       Physical Exam:  /80 (BP Location: Right arm, Patient Position: Sitting, Cuff Size: Adult)   Pulse 58   Temp 98 1 °F (36 7 °C)   Resp 18   Ht 5' 3" (1 6 m)   Wt 68 kg (150 lb)   LMP  (LMP Unknown)   SpO2 99%   BMI 26 57 kg/m²     Physical Exam  Vitals reviewed  Constitutional:       General: She is not in acute distress  Appearance: She is well-developed  She is not diaphoretic  HENT:      Head: Normocephalic and atraumatic  Eyes:      General: No scleral icterus  Conjunctiva/sclera: Conjunctivae normal       Pupils: Pupils are equal, round, and reactive to light  Neck:      Thyroid: No thyromegaly  Cardiovascular:      Rate and Rhythm: Normal rate and regular rhythm  Heart sounds: Normal heart sounds  No murmur heard  Pulmonary:      Effort: Pulmonary effort is normal  No respiratory distress  Breath sounds: Normal breath sounds  Abdominal:      General: There is no distension  Palpations: Abdomen is soft  There is no hepatomegaly or splenomegaly  Tenderness: There is no abdominal tenderness  Musculoskeletal:         General: No swelling  Normal range of motion  Cervical back: Normal range of motion and neck supple  Lymphadenopathy:      Cervical: No cervical adenopathy  Upper Body:      Right upper body: No axillary adenopathy  Left upper body: No axillary adenopathy  Skin:     General: Skin is warm and dry  Findings: No erythema or rash  Neurological:      General: No focal deficit present  Mental Status: She is alert and oriented to person, place, and time  Psychiatric:         Mood and Affect: Mood normal  Affect is blunt  Behavior: Behavior normal  Behavior is cooperative  Thought Content:  Thought content normal          Judgment: Judgment normal  Labs:  Lab Results   Component Value Date    WBC 3 53 (L) 11/09/2022    HGB 15 5 (H) 11/09/2022    HCT 46 7 (H) 11/09/2022    MCV 93 11/09/2022     (L) 11/09/2022          Patient voiced understanding and agreement in the above discussion  Aware to contact our office with questions/symptoms in the interim  This note has been generated by voice recognition software system  Therefore, there may be spelling, grammar, and or syntax errors  Please contact if questions arise

## 2023-02-10 ENCOUNTER — TELEPHONE (OUTPATIENT)
Dept: INFUSION CENTER | Facility: CLINIC | Age: 48
End: 2023-02-10

## 2023-02-10 DIAGNOSIS — D50.8 IRON DEFICIENCY ANEMIA SECONDARY TO INADEQUATE DIETARY IRON INTAKE: Primary | ICD-10-CM

## 2023-02-10 RX ORDER — CYANOCOBALAMIN 1000 UG/ML
1000 INJECTION, SOLUTION INTRAMUSCULAR; SUBCUTANEOUS ONCE
OUTPATIENT
Start: 2023-04-26

## 2023-02-10 NOTE — TELEPHONE ENCOUNTER
Patient saw Jose Miguel Carr yesterday and needs to be scheduled for B12 injections  Please contact patient

## 2023-02-10 NOTE — TELEPHONE ENCOUNTER
Patient returned my call and stated she spoke to Kaylie De La Vega to add on B12 to already scheduled infusion for 4/26  Thanks

## 2023-02-18 DIAGNOSIS — K21.9 GASTROESOPHAGEAL REFLUX DISEASE WITHOUT ESOPHAGITIS: ICD-10-CM

## 2023-02-18 RX ORDER — FAMOTIDINE 40 MG/1
TABLET, FILM COATED ORAL
Qty: 30 TABLET | Refills: 2 | Status: SHIPPED | OUTPATIENT
Start: 2023-02-18

## 2023-03-03 ENCOUNTER — HOSPITAL ENCOUNTER (OUTPATIENT)
Dept: RADIOLOGY | Facility: IMAGING CENTER | Age: 48
End: 2023-03-03

## 2023-03-03 VITALS — WEIGHT: 135 LBS | BODY MASS INDEX: 23.92 KG/M2 | HEIGHT: 63 IN

## 2023-03-03 DIAGNOSIS — Z12.31 ENCOUNTER FOR SCREENING MAMMOGRAM FOR MALIGNANT NEOPLASM OF BREAST: ICD-10-CM

## 2023-03-05 NOTE — PROGRESS NOTES
Outpatient Follow up  [address]  [address]  [facility] [address]  Gala Zuleta MD  Ph : [phone]  Fax : [phone]  Mobile : [phone]  Email : [email or web address]  Also available on [employer]    Anthony Peña 49 y o  female MRN: [id number]    PCP: Clover Portillo DO  Referring: No referring provider defined for this encounter  Anthony Peña presented for a follow up visit  My recommendations are included  Please do not hesitate to contact me with any questions you may have  ASSESSMENT AND PLAN:      No problem-specific Assessment & Plan notes found for this encounter  Diagnoses and all orders for this visit:    Postgastrectomy malabsorption    Gastroesophageal reflux disease without esophagitis    Chronic idiopathic constipation    Colon cancer screening  -     Colonoscopy; Future    Other orders  -     Diet NPO; Sips with meds; Standing  -     Void on call to OR; Standing  -     Insert peripheral IV; Standing         1  GERD:   - She is on omeprazole 40 mg bid and pepcid at night  She is doing well now  She has tried omeprazole once daily but that has not worked well  So we will continue the current dose  She is on a MVT  - I have also reached out to Dr Magnolia White to see if there is anything further we can do surgically for her  2  Constipation   - She has a regimen with coffee and creamer which helps her  3  Post Sleeve  - reached out to Dr Magnolia White to see if she would be eligible for any other interventions  4  Colon cancer screening    - discussed at length  - will consider colonoscopy    ______________________________________________________________________    SUBJECTIVE:  48 y/o with h/o complicated sleeve and reflux now post surgery  She has regurgitation of liquids when she drinks something and has to bend down or rolls over in sleep  She is on pepcid 40 mg at night   She is on omeprazole 40 mg daily twice daily  Her weight has stabilized  She had her blood work in Nov 2022  Her labs were okay but the platelets were low  Her constipation is better now  She has not been taking anything for it but has a routine now which helps her to go every day  She is back at work and does her exercise  REVIEW OF SYSTEMS IS OTHERWISE NEGATIVE  Historical Information   Past Medical History:   Diagnosis Date   • Abdominal pain    • Acute cystitis with hematuria 1/3/2020   • Brain condition     Pseudotumor Cerebri    • Chronic kidney disease    • De Quervain's disease (tenosynovitis)    • Esophageal perforation    • Gastric leak    • GERD (gastroesophageal reflux disease)    • Headache    • Idiopathic intracranial hypertension    • Kidney stone    • Migraine    • No blood products     per pt: personal and Shinto beliefs  surgeon office aware 12/13/19   • Papilledema, both eyes    • PONV (postoperative nausea and vomiting)    • Postgastrectomy malabsorption    • Presence of lumboperitoneal shunt     Resolved: Sep 20, 2017   • Rotator cuff tendinitis     Resolved: Aug 23, 2017   • Visual field defect      Past Surgical History:   Procedure Laterality Date   • BREAST BIOPSY Left 1993    benign   • CSF SHUNT      LP shunt - 2015 -  shunt - 2017   • ESOPHAGOGASTRODUODENOSCOPY N/A 02/23/2018    Procedure: ESOPHAGOGASTRODUODENOSCOPY (EGD) WITH ESOPHAGEAL STENT PLACEMENT;  Surgeon: Floyd Spatz, MD;  [facility];  Service: Thoracic   • ESOPHAGOGASTRODUODENOSCOPY N/A 02/23/2018    Procedure: ESOPHAGOGASTRODUODENOSCOPY (EGD) WITH REMOVAL ESOPHAGEAL STENT  AND REPLACEMENT WITH 23mm X155mm [address];  Surgeon: Floyd Spatz, MD;  [facility];  Service: Thoracic   • ESOPHAGOGASTRODUODENOSCOPY N/A 03/28/2018    Procedure: ESOPHAGOGASTRODUODENOSCOPY (EGD) with PEJ placement ;  Surgeon: Imtiaz Mckinney MD;  [facility];   Service: Gastroenterology   • ESOPHAGOGASTRODUODENOSCOPY N/A 04/05/2018    Procedure: ESOPHAGOGASTRODUODENOSCOPY (EGD) with botox injection and kaofed placement;  Surgeon: Yue Nichols DO;  [facility]; Service: Gastroenterology   • ESOPHAGOGASTRODUODENOSCOPY N/A 04/10/2018    Procedure: ESOPHAGOGASTRODUODENOSCOPY (EGD) with Kaofed placement;  Surgeon: Rajendra Suarez MD;  [facility]; Service: Gastroenterology   • ESOPHAGOSCOPY WITH STENT INSERTION N/A 01/24/2018    Procedure: INSERTION STENT ESOPHAGEAL;  Surgeon: Hadley Garg MD;  [facility]; Service: Gastroenterology   • EYE SURGERY Bilateral 1980    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)    • FL RETROGRADE PYELOGRAM  06/24/2020   • FL RETROGRADE PYELOGRAM  08/21/2021   • GASTRIC BYPASS  12/19/2016    Lap sleeve gastrectomy w/shunt length shortening procedure   • HIATAL HERNIA REPAIR     • HYSTERECTOMY  03/14/2013   • IR LUMBAR PUNCTURE  08/29/2018   • LAPAROTOMY N/A 01/25/2018    Procedure: Exploratory Laparotomy, wash out,placement of drains, placement of NG feeding tube ;   Surgeon: Wolfgang Melendez DO;  [facility];  Service: General   • LUMBAR PERITONEAL SHUNT  11/19/2015    Laparoscopic assisted   • FL BREAST REDUCTION Bilateral 03/25/2022    Procedure: BILATERAL BREAST REDUCTION;  Surgeon: Buck Saenz MD;  [facility];  Service: Plastics   • FL CRTJ SHUNT [id number] Right 12/18/2019    Procedure: IMAGE GUIDED INSERTION OF RIGHT CORONAL VENTRICULAR-ATRIAL SHUNT;  Surgeon: Sony Kitchen MD;  [facility];  Service: Neurosurgery   • FL CRTJ SHUNT [id number] TERMINUS Right 05/31/2017    Procedure: IMAGE GUIDED CORONAL PLACEMENT OF PROGRAMABLE VENTRICULAR-PERITONEAL SHUNT, REMOVAL OF LP SHUNT ;  Surgeon: Sony Kitchen MD;  [facility];  Service: Neurosurgery   • FL CYSTO BLADDER W/URETERAL CATHETERIZATION N/A 06/06/2020    Procedure: Bilateral CYSTOSCOPY RETROGRADE PYELOGRAM WITH INSERTION STENT URETERAL;  Surgeon: April Spears MD;  Location: [address] MAIN OR;  Service: Urology   • MD CYSTO/URETERO W/LITHOTRIPSY &INDWELL STENT INSRT Bilateral 06/24/2020    Procedure: CYSTOSCOPY URETEROSCOPY WITH LITHOTRIPSY HOLMIUM LASER, RETROGRADE PYELOGRAM AND exchange bilateral  STENTs URETERAL;  Surgeon: Juan Abraham MD;  [facility];  Service: Urology   • MD CYSTO/URETERO W/LITHOTRIPSY &INDWELL STENT INSRT Left 08/21/2021    Procedure: CYSTOSCOPY; LEFT URETEROSCOPY WITH RETROGRADE PYELOGRAM, REMOVAL OF STONE AND INSERTION LEFT URETERAL STENT;  Surgeon: Carmen Benitez MD;  [facility];  Service: Urology   • MD EGD TRANSORAL BIOPSY SINGLE/MULTIPLE N/A 06/27/2018    Procedure: ESOPHAGOGASTRODUODENOSCOPY (EGD) with padlock clip placement;  Surgeon: Anton Alonso MD;  [facility];   Service: Bariatrics   • MD RMVL COMPL CSF SHUNT SYSTEM W/O RPLCMT SHUNT Right 02/05/2018    Procedure: Removal of  shunt;  Surgeon: Estela Vigil MD;  [facility];  Service: Neurosurgery   • MD RPLCMT/REVJ CSF SHUNT VALVE/CATH SHUNT SYS Right 01/25/2018    Procedure: Externalization of right-sided SHUNT VENTRICULAR-PERITONEAL in anterior chest wall ribs two and three level  ;  Surgeon: Diandra Kate MD;  [facility];  Service: Neurosurgery   • WRIST SURGERY Right     x3 2006, 2008     Social History   Social History     Substance and Sexual Activity   Alcohol Use Never     Social History     Substance and Sexual Activity   Drug Use Yes   • Frequency: 7 0 times per week   • Types: Marijuana    Comment: medical marijuana, vaping & topical      Social History     Tobacco Use   Smoking Status Former   • Packs/day: 0 50   • Years: 20 00   • Pack years: 10 00   • Types: Cigarettes   • Quit date: 8/24/2012   • Years since quitting: 10 5   Smokeless Tobacco Never     Family History   Problem Relation Age of Onset   • Kidney cancer Mother 77   • No Known Problems Father    • No Known Problems Sister    • No Known Problems Daughter    • No Known Problems Maternal Grandmother    • Colon cancer Maternal Grandfather 44   • No Known Problems Paternal Grandmother    • Cancer Paternal Grandfather    • Thyroid cancer Brother 36   • No Known Problems Maternal Aunt    • No Known Problems Maternal Aunt    • No Known Problems Paternal Aunt    • Cancer Family         Gastric   • Leukemia Family    • Colon cancer Family    • Pancreatic cancer Family    • Lung cancer Maternal Uncle 64       Meds/Allergies       Current Outpatient Medications:   •  ascorbic acid (VITAMIN C) 250 MG CHEW  •  Biotin 1 MG CAPS  •  calcium citrate-vitamin D (CITRACAL+D) 315-200 MG-UNIT per tablet  •  famotidine (PEPCID) 40 MG tablet  •  ferrous gluconate 324 (23 8 Fe) mg  •  folic acid (FOLVITE) 1 mg tablet  •  Multiple Vitamin (MULTIVITAMIN) tablet  •  Multiple Vitamins-Minerals (Hair/Skin/Nails) CAPS  •  omeprazole (PriLOSEC) 40 MG capsule  •  vitamin B-12 (VITAMIN B-12) 500 mcg tablet    Allergies   Allergen Reactions   • Benadryl [Diphenhydramine] Anaphylaxis     Throat closing   • Phenergan [Promethazine] Anaphylaxis           Objective     Blood pressure 138/84, pulse 70, temperature 98 °F (36 7 °C), temperature source Tympanic, weight 65 3 kg (144 lb), not currently breastfeeding  Body mass index is 25 51 kg/m²  PHYSICAL EXAM:      Physical Exam  Constitutional:       Appearance: Normal appearance  She is well-developed  HENT:      Head: Normocephalic and atraumatic  Eyes:      General: No scleral icterus  Conjunctiva/sclera: Conjunctivae normal       Pupils: Pupils are equal, round, and reactive to light  Cardiovascular:      Rate and Rhythm: Normal rate and regular rhythm  Heart sounds: Normal heart sounds  Pulmonary:      Effort: Pulmonary effort is normal  No respiratory distress  Breath sounds: Normal breath sounds     Abdominal:      General: Bowel sounds are normal  There is no distension  Palpations: Abdomen is soft  There is no mass  Tenderness: There is no abdominal tenderness  Hernia: No hernia is present  Musculoskeletal:         General: Normal range of motion  Cervical back: Normal range of motion  Lymphadenopathy:      Cervical: No cervical adenopathy  Skin:     General: Skin is warm  Neurological:      Mental Status: She is alert and oriented to person, place, and time  Psychiatric:         Behavior: Behavior normal          Thought Content: Thought content normal          Lab Results:   No visits with results within 1 Day(s) from this visit  Latest known visit with results is:   Appointment on 12/07/2022   Component Date Value   • Zinc 12/07/2022 60    • Vitamin B1, Whole Blood 12/07/2022 137 5    • Vitamin A 12/07/2022 34 7    • PTH 12/07/2022 77 3          Radiology Results:   No results found

## 2023-03-06 ENCOUNTER — OFFICE VISIT (OUTPATIENT)
Dept: GASTROENTEROLOGY | Facility: MEDICAL CENTER | Age: 48
End: 2023-03-06

## 2023-03-06 ENCOUNTER — TELEPHONE (OUTPATIENT)
Dept: GASTROENTEROLOGY | Facility: MEDICAL CENTER | Age: 48
End: 2023-03-06

## 2023-03-06 VITALS
HEART RATE: 70 BPM | WEIGHT: 144 LBS | SYSTOLIC BLOOD PRESSURE: 138 MMHG | DIASTOLIC BLOOD PRESSURE: 84 MMHG | TEMPERATURE: 98 F | BODY MASS INDEX: 25.51 KG/M2

## 2023-03-06 DIAGNOSIS — K21.9 GASTROESOPHAGEAL REFLUX DISEASE WITHOUT ESOPHAGITIS: ICD-10-CM

## 2023-03-06 DIAGNOSIS — K91.2 POSTGASTRECTOMY MALABSORPTION: Primary | Chronic | ICD-10-CM

## 2023-03-06 DIAGNOSIS — K59.04 CHRONIC IDIOPATHIC CONSTIPATION: ICD-10-CM

## 2023-03-06 DIAGNOSIS — Z90.3 POSTGASTRECTOMY MALABSORPTION: Primary | Chronic | ICD-10-CM

## 2023-03-06 DIAGNOSIS — Z12.11 COLON CANCER SCREENING: ICD-10-CM

## 2023-03-06 NOTE — TELEPHONE ENCOUNTER
Pt will think about scheduling a colon with Dr Leopold Solo @ hospital setting  She will call to schedule

## 2023-03-08 ENCOUNTER — OFFICE VISIT (OUTPATIENT)
Dept: PLASTIC SURGERY | Facility: CLINIC | Age: 48
End: 2023-03-08

## 2023-03-08 DIAGNOSIS — Z98.890 S/P BILATERAL BREAST REDUCTION: Primary | ICD-10-CM

## 2023-03-08 NOTE — PROGRESS NOTES
Assessment/Plan:     Patient is a 79-year-old female who is status post bilateral breast reduction with Dr Estefanía Girard on 03/25/2022Shauna Blackman see HPI  Patient returns to the office today for a final postoperative check  She is very pleased with her aesthetic results  Please see photos in media  Additionally, the patient is potentially interested in lip filler versus Botox  She will call the office and schedule a consultation with Dr Estefanía Girard      There are no diagnoses linked to this encounter  Subjective:     Patient ID: Moris Weber is a 49 y o  female  HPI     Patient reports no issues or concerns today  She is very pleased with her results and states that all back pain has resolved  Review of Systems    See HPI    Objective:     Physical Exam      Bilateral breast are soft, supple and grossly symmetric

## 2023-03-22 ENCOUNTER — TELEPHONE (OUTPATIENT)
Dept: NEUROLOGY | Facility: CLINIC | Age: 48
End: 2023-03-22

## 2023-03-22 NOTE — TELEPHONE ENCOUNTER
Patient called to schedule follow up with Jesus Diamond, her last visit was 5-4-22 and I offered her 3-31-23 at 1030 am with Lianne in [facility] and she accepted

## 2023-03-31 ENCOUNTER — TELEPHONE (OUTPATIENT)
Dept: NEUROLOGY | Facility: CLINIC | Age: 48
End: 2023-03-31

## 2023-03-31 ENCOUNTER — OFFICE VISIT (OUTPATIENT)
Dept: NEUROLOGY | Facility: CLINIC | Age: 48
End: 2023-03-31

## 2023-03-31 ENCOUNTER — HOSPITAL ENCOUNTER (OUTPATIENT)
Dept: RADIOLOGY | Facility: HOSPITAL | Age: 48
Discharge: HOME | End: 2023-03-31

## 2023-03-31 VITALS
WEIGHT: 147.6 LBS | TEMPERATURE: 97.4 F | HEIGHT: 63 IN | SYSTOLIC BLOOD PRESSURE: 118 MMHG | HEART RATE: 49 BPM | BODY MASS INDEX: 26.15 KG/M2 | DIASTOLIC BLOOD PRESSURE: 84 MMHG

## 2023-03-31 DIAGNOSIS — G43.109 MIGRAINE WITH AURA AND WITHOUT STATUS MIGRAINOSUS, NOT INTRACTABLE: ICD-10-CM

## 2023-03-31 DIAGNOSIS — Z98.2 S/P VP SHUNT: ICD-10-CM

## 2023-03-31 DIAGNOSIS — G93.2 PSEUDOTUMOR CEREBRI: ICD-10-CM

## 2023-03-31 DIAGNOSIS — G93.2 PSEUDOTUMOR CEREBRI: Primary | ICD-10-CM

## 2023-03-31 RX ORDER — BUTALBITAL, ACETAMINOPHEN AND CAFFEINE 50; 325; 40 MG/1; MG/1; MG/1
1 TABLET ORAL EVERY 6 HOURS PRN
Qty: 10 TABLET | Refills: 5 | Status: SHIPPED | OUTPATIENT
Start: 2023-03-31 | End: 2023-04-30

## 2023-03-31 RX ORDER — DEXAMETHASONE 2 MG/1
2 TABLET ORAL
Qty: 5 TABLET | Refills: 0 | Status: SHIPPED | OUTPATIENT
Start: 2023-03-31

## 2023-03-31 RX ORDER — ACETAZOLAMIDE 250 MG/1
250 TABLET ORAL 3 TIMES DAILY
Qty: 60 TABLET | Refills: 3 | Status: SHIPPED | OUTPATIENT
Start: 2023-03-31

## 2023-03-31 NOTE — PROGRESS NOTES
[facility]  PATIENT:  Rama Chaudhary  MRN:  [id number]  :  1975  DATE OF SERVICE:  3/31/2023      Assessment/Plan:     Migraine with aura and without status migrainosus, not intractable   Preventive:  Continue all your vitamins  Increase Vyepti 300 mg every 3 months  Other options are the qulipta or nurtec by mouth or other injections such as aimovig or ajovy    Abortive: If needed use Fioricet but only 10 month    For next 5 days take Decadron 2 mg in am    Diamox 250 mg every 8 hours for the next 5-7 days  XR shunt series and CT head    Pseudotumor cerebri   Preventive:  Continue all your vitamins  Increase Vyepti 300 mg every 3 months  Other options are the qulipta or nurtec by mouth or other injections such as aimovig or ajovy    Abortive: If needed use Fioricet but only 10 month    For next 5 days take Decadron 2 mg in am    Diamox 250 mg every 8 hours for the next 5-7 days  XR shunt series and CT head         Problem List Items Addressed This Visit        Cardiovascular and Mediastinum    Migraine with aura and without status migrainosus, not intractable      Preventive:  Continue all your vitamins  Increase Vyepti 300 mg every 3 months  Other options are the qulipta or nurtec by mouth or other injections such as aimovig or ajovy    Abortive: If needed use Fioricet but only 10 month    For next 5 days take Decadron 2 mg in am    Diamox 250 mg every 8 hours for the next 5-7 days  XR shunt series and CT head         Relevant Medications    butalbital-acetaminophen-caffeine (FIORICET,ESGIC) -40 mg per tablet       Nervous and Auditory    Pseudotumor cerebri - Primary      Preventive:  Continue all your vitamins  Increase Vyepti 300 mg every 3 months  Other options are the qulipta or nurtec by mouth or other injections such as aimovig or ajovy    Abortive:    If needed use Fioricet but only 10 month    For next 5 days take Decadron 2 mg in am    Diamox 250 mg every 8 "hours for the next 5-7 days  XR shunt series and CT head         Relevant Medications    acetaZOLAMIDE (DIAMOX) 250 mg tablet    dexamethasone (DECADRON) 2 mg tablet    butalbital-acetaminophen-caffeine (FIORICET,ESGIC) -40 mg per tablet    Other Relevant Orders    XR shunt series    CT head wo contrast (Completed)       Other    S/P  shunt    Relevant Medications    acetaZOLAMIDE (DIAMOX) 250 mg tablet    butalbital-acetaminophen-caffeine (FIORICET,ESGIC) -40 mg per tablet    Other Relevant Orders    XR shunt series    CT head wo contrast (Completed)           History of Present Illness: We had the pleasure of evaluating Shweta Salvador in neurological follow up  today for headaches  As you know,  she is a 49 y o   right handed female  Patient states that face, nose and mouth hurts  This is continuous  Started a job again over the summer  Feels like she is getting worse  Daily pain is a 9-10/10  She is miserable for the past 2 weeks prior was a 3-4/10  She feels like she did before her initial diagnosis    Past medical history:  Went to [facility] for hiatal hernia and was in ICU for a long time   Was septic after her surgeries and had her  shunt removed 2/2018 due to concerns of bacterial infection spreading through the shunt     Patient complains of pain in the right side of head like had been hit  Taya [place] has \"zingers\" 1-2 a week and lasts a minute to 30 minutes   Sometimes has to lay down for the rest of the day   and hears a vibration noise for 2-3 week   Sometimes is very loud and annoying   This pain began in mid November  [place] Minion is the same as before  Terrence Minion is similar to prior to shunt replacement  Elisabet Kenna is only new symptoms       Idiopathic intracranial hypertension:  She is s/p lumboperitoneal shunt placement for increased intracranial pressure, performed at [facility] in 2015 and removal of the shunt and placement of a  shunt in May of 2017   She is also status post gastric " bypass surgery and has lost 60lbs       She saw her ophthalmologist 3/2020 and there was no optic nerve swelling      Patient reports she has daily pressure and pain  Has good days and bad days but is always has pressure   States that had eye exam approx 6 months ago and was fine  Is scheduled to 4/14/2023 for another exam     What medications do you take or have you taken for your headaches?    Current Preventative:  Vitamin-C vitamin-D folic acid multivitamin vitamin B12  vyepti     Prior PREVENTIVE:  nortriptyline (this helped her headaches),  Protriptyline  Gabapentin (off balance), Depakote, lamictal  Diamox (stopped by provider because of her history of kidney stones),   Verapamil (fatigue),  Emgality (3 doses, didn't notice a difference)    Prior ABORTIVE:   Fioricet (stopped on her own), Tramadol (stopped on her own),   Toradol (stopped on her own), Indomethacin     Non-Medical/Alternative Treatments used in the past for headaches: Has tried Massage (doesn’t help headaches), Chiropractic adjustment (doesn’t help with headaches), Acupuncture (doesn’t help with headaches)     What is your current pain level - 9/10,     How often do the headaches occur -   Baseline pain is : daily is 2-3/10  Increase pain: does have variable days of increasing pain   Pain can increase for 30 minutes or last all day every couple of days  Can have 10/10 pain once a week (when weather is bad)  She is also getting the sensation of light headed and pain in the frontal region with bending over or getting up from sitting potision      What time of the day do the headaches start -   Baseline pain: there all the time  Increase pain: anytime of the day     How long do the headaches last -   Baseline pain is : there all the time  Increase pain: few minutes to rest of the day  She is also getting the sensation of light headed and pain in the frontal region with bending over or getting up from sitting poistion-this is also variable, usually "happens 1-2 times a week   Lasts only a minute or 2     Are you ever headache free - no always has pressure in her head     Where are they located -   Baseline pain: entire head  Increase pain: one temple to the other or frontal to the back of her head  Position change: frontal only     What is the intensity of pain -   Baseline pain: 2-4/10  Intense pain: 10/10  Position change pain: 9/10     Any warning prior to headache and how long do they last - N/A, they're constant     Describe your usual headache -   Baseline pain: Pressure, \"like my brain is going to explode out of my skull\"  Increase pain: zap, zinger and it can be sharp (pt has difficulty describing the sensation)  Position change: sharp pressure     Associated symptoms:   -       Problem with concentration  -       Blurred vision occasionally  -       Dizziness when extending or flexing neck to look up or down  -       prefer to be alone and in a dark room, unable to work     Headache are worse if the patient: cough, sneeze, bending over, moving bowel, running or exercising,   Headache trigger: N/A, they're constant     Have you used CBD or THC for your headaches and how often? Yes, has medical card  Are you current pregnant or planning on getting pregnant? No  Have you ever had any Brain imaging? yes  I personally reviewed these images         MRAs of the head and neck without contrast done on 3/29/17 are both unremarkable      Brain MRI without contrast on 3/27/17 shows ventricles in the lower limits of normal in size consistent with pseudotumor cerebri, as well as scattered parenchymal WM changes which are nonspecific      Head CT 7/17: No acute intracranial abnormality      LP @ [facility] April/ 2017: OP 21, CP14     Reviewed old notes from physician seen in the past- see above HPI for summary of previous encounters            Past Medical History:   Diagnosis Date   • Abdominal pain    • Acute cystitis with hematuria 1/3/2020   • Brain condition     " Pseudotumor Cerebri    • Chronic kidney disease    • De Quervain's disease (tenosynovitis)    • Esophageal perforation    • Gastric leak    • GERD (gastroesophageal reflux disease)    • Headache    • Idiopathic intracranial hypertension    • Kidney stone    • Migraine    • No blood products     per pt: personal and [employer] beliefs   surgeon office aware 12/13/19   • Papilledema, both eyes    • PONV (postoperative nausea and vomiting)    • Postgastrectomy malabsorption    • Presence of lumboperitoneal shunt     Resolved: Sep 20, 2017   • Rotator cuff tendinitis     Resolved: Aug 23, 2017   • Visual field defect        Patient Active Problem List   Diagnosis   • Pseudotumor cerebri   • S/P  shunt   • Murmur, cardiac   • Refusal of blood product   • Gastroesophageal reflux disease   • Choroidal nevus, right eye   • Moderate protein-calorie malnutrition ([address] )   • Bariatric surgery status   • Refusal of influenza vaccine by provider   • Renal calculi   • PTSD (post-traumatic stress disorder)   • History of idiopathic intracranial hypertension   • Chronic tension-type headache, intractable   • Postgastrectomy malabsorption   • Papilledema   • Family history of malignant neoplasm of gastrointestinal tract   • Hypercholesteremia   • Subacromial bursitis of right shoulder joint   • Mixed incontinence   • Hydroureteronephrosis   • Heart burn   • Encounter for surgical aftercare following surgery of digestive system   • Migraine with aura and without status migrainosus, not intractable   • Anemia   • Colon cancer screening   • Iron deficiency anemia secondary to inadequate dietary iron intake   • Annual physical exam   • COVID-19 vaccination refused   • Macromastia   • S/P bilateral breast reduction   • Chronic idiopathic constipation   • COVID-19 virus infection       Medications:      Current Outpatient Medications   Medication Sig Dispense Refill   • acetaZOLAMIDE (DIAMOX) 250 mg tablet Take 1 tablet (250 mg total) by mouth 3 (three) times a day 60 tablet 3   • ascorbic acid (VITAMIN C) 250 MG CHEW Chew 250 mg daily     • Biotin 1 MG CAPS Take 1 mg by mouth daily       • butalbital-acetaminophen-caffeine (FIORICET,ESGIC) -40 mg per tablet Take 1 tablet by mouth every 6 (six) hours as needed for headaches or migraine Only 10 per month 10 tablet 5   • calcium citrate-vitamin D (CITRACAL+D) 315-200 MG-UNIT per tablet Take 1 tablet by mouth 2 (two) times a day     • dexamethasone (DECADRON) 2 mg tablet Take 1 tablet (2 mg total) by mouth daily with breakfast 5 tablet 0   • famotidine (PEPCID) 40 MG tablet take 1 tablet by mouth once daily 30 tablet 2   • ferrous gluconate 324 (37 5 Fe) mg Take 324 mg by mouth 2 (two) times a day before meals     • Ferrous Sulfate (IRON PO) Take 2 tablets by mouth in the morning Chewable     • folic acid (FOLVITE) 1 mg tablet Take 2 tablets (2 mg total) by mouth daily 180 tablet 3   • Multiple Vitamin (MULTIVITAMIN) tablet Take 1 tablet by mouth daily Wears a patch     • Multiple Vitamins-Minerals (Hair/Skin/Nails) CAPS Take 1 tablet by mouth 2 (two) times a day       • omeprazole (PriLOSEC) 40 MG capsule take 1 capsule by mouth twice a day 60 capsule 2   • vitamin B-12 (VITAMIN B-12) 500 mcg tablet Take 500 mcg by mouth daily       No current facility-administered medications for this visit  Allergies:       Allergies   Allergen Reactions   • Benadryl [Diphenhydramine] Anaphylaxis     Throat closing   • Phenergan [Promethazine] Anaphylaxis       Family History:     Family History   Problem Relation Age of Onset   • Kidney cancer Mother 77   • No Known Problems Father    • No Known Problems Sister    • No Known Problems Daughter    • No Known Problems Maternal Grandmother    • Colon cancer Maternal Grandfather 44   • No Known Problems Paternal Grandmother    • Cancer Paternal Grandfather    • Thyroid cancer Brother 36   • No Known Problems Maternal Aunt    • No Known Problems Maternal Aunt "   • No Known Problems Paternal Aunt    • Cancer Family         Gastric   • Leukemia Family    • Colon cancer Family    • Pancreatic cancer Family    • Lung cancer Maternal Uncle 64       Social History:     Social History     Socioeconomic History   • Marital status: Single     Spouse name: Not on file   • Number of children: 2   • Years of education: High School or GED    • Highest education level: Not on file   Occupational History   • Occupation: employed    Tobacco Use   • Smoking status: Former     Packs/day: 0 50     Years: 20 00     Pack years: 10 00     Types: Cigarettes     Quit date: 8/24/2012     Years since quitting: 10 6   • Smokeless tobacco: Never   Vaping Use   • Vaping Use: Every day   • Substances: THC, CBD   Substance and Sexual Activity   • Alcohol use: Never   • Drug use: Yes     Frequency: 7 0 times per week     Types: Marijuana     Comment: medical marijuana, vaping & topical    • Sexual activity: Yes   Other Topics Concern   • Not on file   Social History Narrative    ** Merged History Encounter **          Social Determinants of Health     Financial Resource Strain: Not on file   Food Insecurity: Not on file   Transportation Needs: Not on file   Physical Activity: Not on file   Stress: Not on file   Social Connections: Not on file   Intimate Partner Violence: Not on file   Housing Stability: Not on file        I have reviewed the patient's medical, social and surgical history as well as medications in detail and updated the computerized patient record      Objective:   Physical Exam:                                                                   Vitals:            /84 (BP Location: Left arm, Patient Position: Sitting, Cuff Size: Standard)   Pulse (!) 49   Temp (!) 97 4 °F (36 3 °C) (Temporal)   Ht 5' 3\" (1 6 m)   Wt 67 kg (147 lb 9 6 oz)   LMP  (LMP Unknown)   BMI 26 15 kg/m²   BP Readings from Last 3 Encounters:   03/31/23 118/84   03/06/23 138/84   02/09/23 140/80     Pulse " Readings from Last 3 Encounters:   03/31/23 (!) 49   03/06/23 70   02/09/23 58          CONSTITUTIONAL: Well developed, well nourished, well groomed  No dysmorphic features  Eyes:  EOM normal      Neck:  Normal ROM, neck supple  HEENT:  Normocephalic atraumatic  Chest:  Respirations regular and unlabored  Psychiatric:  Normal behavior and appropriate affect      MENTAL STATUS  Orientation: Alert and oriented x 3  Fund of knowledge: Intact  MOTOR (Upper and lower extremities)   Bulk/tone/abnormal movement: Normal muscle bulk and tone  COORDINATION   Station/Gait: Normal baseline gait  Review of Systems:   Review of Systems  Constitutional: Negative  HENT: Negative  Eyes: Negative  Respiratory: Negative  Cardiovascular: Negative  Gastrointestinal: Negative  Endocrine: Negative  Genitourinary: Negative  Musculoskeletal: Negative  Skin: Negative  Allergic/Immunologic: Negative  Neurological: Positive for dizziness and headaches  Hematological: Negative  Psychiatric/Behavioral: Negative        I personally reviewed the ROS entered by the MA      I have spent a total time of 40 minutes on 03/31/23 in caring for this patient including Diagnostic results, Prognosis, Risks and benefits of tx options, Patient and family education, Risk factor reductions, Impressions, Counseling / Coordination of care, Documenting in the medical record, Reviewing / ordering tests, medicine, procedures   and Obtaining or reviewing history          Author:  Devan Cunningham PA-C 3/31/2023 1:42 PM

## 2023-03-31 NOTE — PATIENT INSTRUCTIONS
Preventive:  Continue all your vitamins  Increase Vyepti 300 mg every 3 months  Other options are the qulipta or nurtec by mouth or other injections such as aimovig or ajovy    Abortive:    If needed use Fioricet but only 10 month    For next 5 days take Decadron 2 mg in am    Diamox 250 mg every 8 hours for the next 5-7 days  XR shunt series and CT head

## 2023-03-31 NOTE — TELEPHONE ENCOUNTER
Called [facility] at [phone], spoke to Dena and advised of the below  I was transferred to another dept  Spoke to [facility]  States that   Vyepti dose increase from 100 mg to 300 mg () will require new PA  Can use previous PA form  Just change the dosage  PA form and copy of the most recent office notes faxed to [facility]ron at [phone]   Marked as urgent    Awaiting determination

## 2023-04-02 DIAGNOSIS — K21.9 GASTROESOPHAGEAL REFLUX DISEASE, UNSPECIFIED WHETHER ESOPHAGITIS PRESENT: ICD-10-CM

## 2023-04-02 RX ORDER — OMEPRAZOLE 40 MG/1
CAPSULE, DELAYED RELEASE ORAL
Qty: 60 CAPSULE | Refills: 2 | Status: SHIPPED | OUTPATIENT
Start: 2023-04-02

## 2023-04-26 ENCOUNTER — HOSPITAL ENCOUNTER (OUTPATIENT)
Dept: INFUSION CENTER | Facility: HOSPITAL | Age: 48
Discharge: HOME | End: 2023-04-26
Attending: PSYCHIATRY & NEUROLOGY

## 2023-04-26 VITALS
OXYGEN SATURATION: 99 % | RESPIRATION RATE: 18 BRPM | TEMPERATURE: 97.8 F | HEART RATE: 62 BPM | DIASTOLIC BLOOD PRESSURE: 64 MMHG | SYSTOLIC BLOOD PRESSURE: 128 MMHG

## 2023-04-26 DIAGNOSIS — G43.109 MIGRAINE WITH AURA AND WITHOUT STATUS MIGRAINOSUS, NOT INTRACTABLE: Primary | ICD-10-CM

## 2023-04-26 DIAGNOSIS — D50.8 IRON DEFICIENCY ANEMIA SECONDARY TO INADEQUATE DIETARY IRON INTAKE: ICD-10-CM

## 2023-04-26 RX ORDER — CYANOCOBALAMIN 1000 UG/ML
1000 INJECTION, SOLUTION INTRAMUSCULAR; SUBCUTANEOUS ONCE
OUTPATIENT
Start: 2023-07-19

## 2023-04-26 RX ORDER — CYANOCOBALAMIN 1000 UG/ML
1000 INJECTION, SOLUTION INTRAMUSCULAR; SUBCUTANEOUS ONCE
Status: COMPLETED | OUTPATIENT
Start: 2023-04-26 | End: 2023-04-26

## 2023-04-26 RX ORDER — SODIUM CHLORIDE 9 MG/ML
20 INJECTION, SOLUTION INTRAVENOUS ONCE
OUTPATIENT
Start: 2023-07-19

## 2023-04-26 RX ORDER — SODIUM CHLORIDE 9 MG/ML
20 INJECTION, SOLUTION INTRAVENOUS ONCE
Status: COMPLETED | OUTPATIENT
Start: 2023-04-26 | End: 2023-04-26

## 2023-04-26 RX ADMIN — SODIUM CHLORIDE 20 ML/HR: 0.9 INJECTION, SOLUTION INTRAVENOUS at 15:02

## 2023-04-26 RX ADMIN — EPTINEZUMAB-JJMR 300 MG: 100 INJECTION INTRAVENOUS at 15:02

## 2023-04-26 RX ADMIN — CYANOCOBALAMIN 1000 MCG: 1000 INJECTION, SOLUTION INTRAMUSCULAR at 15:51

## 2023-04-26 NOTE — PROGRESS NOTES
Patient tolerated vyepti treatment without reaction or issues  vitamin b12 injection given to rt arm  AVS deferred by patient  Patient ambulated off unit without incident  All personal belongings taken with patient

## 2023-04-28 ENCOUNTER — OFFICE VISIT (OUTPATIENT)
Dept: PODIATRY | Facility: CLINIC | Age: 48
End: 2023-04-28

## 2023-04-28 ENCOUNTER — APPOINTMENT (OUTPATIENT)
Dept: RADIOLOGY | Facility: CLINIC | Age: 48
End: 2023-04-28

## 2023-04-28 VITALS
HEART RATE: 65 BPM | WEIGHT: 150.4 LBS | DIASTOLIC BLOOD PRESSURE: 83 MMHG | BODY MASS INDEX: 26.65 KG/M2 | SYSTOLIC BLOOD PRESSURE: 151 MMHG | HEIGHT: 63 IN

## 2023-04-28 DIAGNOSIS — M89.8X9 EXOSTOSIS: ICD-10-CM

## 2023-04-28 DIAGNOSIS — M79.676 PAIN OF FIFTH TOE: Primary | ICD-10-CM

## 2023-04-28 DIAGNOSIS — M79.676 PAIN OF FIFTH TOE: ICD-10-CM

## 2023-04-28 NOTE — PROGRESS NOTES
Assessment/Plan:    No problem-specific Assessment & Plan notes found for this encounter  Diagnoses and all orders for this visit:    Pain of fifth toe  -     X-ray foot right 3+ views; Future    Exostosis      Plan:     -  Patient was counseled and educated on the condition and the diagnosis  The diagnosis, treatment options and prognosis were discussed in detail    -Right Foot x-rays obtained, I personally reviewed the x-ray finding with patient which is consistent with a small exostosis noted on the distal phalanx of the fifth toe causing her discomfort   -I recommended conservative measures at this time including weight-bear as tolerated in wider toebox sneakers and wearing silicone toe sleeve to the affected toe  We also briefly discussed surgical treatment include excision of the exostosis  -Patient will continue with conservative measures at this time  -Return in 4 to 6 weeks for follow-up    Subjective:      Patient ID: Maninder Vee is a 49 y o  female  79-year-old female with past medical history as below presents for valuation of right toe pain duration of years  Patient reports she has discomfort just proximal to her toenail plate  The discomfort is usually with pressure specifically wearing shoes and socks  Patient reports she is unable to wear closed toed shoes at times  She also reports to me as a child she remember complaining of her toe pain to her mother and having it taken off  She denies any trauma to the area  No other complaints        The following portions of the patient's history were reviewed and updated as appropriate:   She  has a past medical history of Abdominal pain, Acute cystitis with hematuria (1/3/2020), Brain condition, Chronic kidney disease, De Quervain's disease (tenosynovitis), Esophageal perforation, Gastric leak, GERD (gastroesophageal reflux disease), Headache, Idiopathic intracranial hypertension, Kidney stone, Migraine, No blood products, Papilledema, both eyes, PONV (postoperative nausea and vomiting), Postgastrectomy malabsorption, Presence of lumboperitoneal shunt, Rotator cuff tendinitis, and Visual field defect  She   Patient Active Problem List    Diagnosis Date Noted   • COVID-19 virus infection 11/10/2022   • Chronic idiopathic constipation 09/23/2022   • Macromastia 04/01/2022   • S/P bilateral breast reduction 04/01/2022   • Annual physical exam 12/17/2021   • COVID-19 vaccination refused 12/17/2021   • Iron deficiency anemia secondary to inadequate dietary iron intake 11/09/2021   • Colon cancer screening 10/21/2021   • Anemia 08/22/2021   • Migraine with aura and without status migrainosus, not intractable 04/30/2021   • Heart burn 09/24/2020   • Encounter for surgical aftercare following surgery of digestive system 09/24/2020   • Hydroureteronephrosis 06/06/2020   • Papilledema 12/10/2019   • Postgastrectomy malabsorption 09/20/2019   • History of idiopathic intracranial hypertension 03/08/2019   • Chronic tension-type headache, intractable 03/08/2019   • PTSD (post-traumatic stress disorder) 01/10/2019   • Renal calculi 10/04/2018   • Refusal of influenza vaccine by provider 09/28/2018   • Bariatric surgery status 06/22/2018   • Moderate protein-calorie malnutrition ([facility] [address] ) 02/21/2018   • Choroidal nevus, right eye 02/03/2018   • Refusal of blood product 01/31/2018   • Murmur, cardiac 01/26/2018   • S/P  shunt 01/24/2018   • Gastroesophageal reflux disease 12/15/2017   • Hypercholesteremia 08/11/2017   • Pseudotumor cerebri 05/31/2017   • Mixed incontinence 04/03/2017   • Subacromial bursitis of right shoulder joint 09/16/2015   • Family history of malignant neoplasm of gastrointestinal tract 07/09/2013     She  has a past surgical history that includes CSF shunt; Gastric bypass (12/19/2016); Hysterectomy (03/14/2013); pr crtj shunt nncfrvukqy-kfsojulyf-mmvjdft terminus (Right, 05/31/2017); ESOPHAGOSCOPY WITH STENT INSERTION (N/A, 01/24/2018);  Eye "surgery (Bilateral, 1980); pr rplcmt/revj csf shunt valve/cath shunt sys (Right, 01/25/2018); LAPAROTOMY (N/A, 01/25/2018); pr rmvl compl csf shunt system w/o rplcmt shunt (Right, 02/05/2018); Esophagogastroduodenoscopy (N/A, 02/23/2018); Esophagogastroduodenoscopy (N/A, 02/23/2018); Esophagogastroduodenoscopy (N/A, 03/28/2018); Esophagogastroduodenoscopy (N/A, 04/05/2018); Esophagogastroduodenoscopy (N/A, 04/10/2018); pr egd transoral biopsy single/multiple (N/A, 06/27/2018); IR lumbar puncture (08/29/2018); Lumbar peritoneal shunt (11/19/2015); Wrist surgery (Right); pr crtj shunt ftektbtzzv-gtq-zei-aur (Right, 12/18/2019); pr cysto bladder w/ureteral catheterization (N/A, 06/06/2020); Hiatal hernia repair; [place] retrograde pyelogram (06/24/2020); pr cysto/uretero w/lithotripsy &indwell stent insrt (Bilateral, 06/24/2020); pr cysto/uretero w/lithotripsy &indwell stent insrt (Left, 08/21/2021); FL retrograde pyelogram (08/21/2021); Breast biopsy (Left, 1993); and pr breast reduction (Bilateral, 03/25/2022)       Review of Systems   All other systems reviewed and are negative  Objective:      /83 (BP Location: Left arm, Patient Position: Sitting, Cuff Size: Standard)   Pulse 65   Ht 5' 3\" (1 6 m)   Wt 68 2 kg (150 lb 6 4 oz)   LMP  (LMP Unknown)   BMI 26 64 kg/m²          Physical Exam  Vitals reviewed  Cardiovascular:      Rate and Rhythm: Normal rate  Pulses: Normal pulses  Musculoskeletal:         General: Tenderness present  Right foot: Tenderness present  Comments: Tenderness to touch noted distal fifth digit just proximal to the nail plate  Toenail does not appear to be ingrown  No apparent clinical prominence noted  Skin:     General: Skin is warm  Capillary Refill: Capillary refill takes less than 2 seconds  Neurological:      General: No focal deficit present  Mental Status: She is alert     Psychiatric:         Mood and Affect: Mood normal          "

## 2023-04-28 NOTE — LETTER
April 28, 2023     Margaret Morales, [address] [facility] [id number] [address]    Patient: Melvi Horowitz   YOB: 1975   Date of Visit: 4/28/2023       Dear Dr Lv Mathur: Thank you for referring Tracy Kurtz to me for evaluation  Below are my notes for this consultation  If you have questions, please do not hesitate to call me  I look forward to following your patient along with you  Sincerely,        Alexandra De Jesus DPM        CC: No Recipients  SEGUNDO Garza [facility]  4/28/2023  1:17 PM  Signed  Assessment/Plan:    No problem-specific Assessment & Plan notes found for this encounter  Diagnoses and all orders for this visit:    Pain of fifth toe  -     X-ray foot right 3+ views; Future    Exostosis     Plan:     -  Patient was counseled and educated on the condition and the diagnosis  The diagnosis, treatment options and prognosis were discussed in detail    -Right Foot x-rays obtained, I personally reviewed the x-ray finding with patient which is consistent with a small exostosis noted on the distal phalanx of the fifth toe causing her discomfort   -I recommended conservative measures at this time including weight-bear as tolerated in wider toebox sneakers and wearing silicone toe sleeve to the affected toe  We also briefly discussed surgical treatment include excision of the exostosis  -Patient will continue with conservative measures at this time  -Return in 4 to 6 weeks for follow-up    Subjective:     Patient ID: Melvi Horowitz is a 49 y o  female  50-year-old female with past medical history as below presents for valuation of right toe pain duration of years  Patient reports she has discomfort just proximal to her toenail plate  The discomfort is usually with pressure specifically wearing shoes and socks  Patient reports she is unable to wear closed toed shoes at times    She also reports to me as a child she remember complaining of her toe pain to her mother and having it taken off   She denies any trauma to the area  No other complaints  The following portions of the patient's history were reviewed and updated as appropriate:   She  has a past medical history of Abdominal pain, Acute cystitis with hematuria (1/3/2020), Brain condition, Chronic kidney disease, De Quervain's disease (tenosynovitis), Esophageal perforation, Gastric leak, GERD (gastroesophageal reflux disease), Headache, Idiopathic intracranial hypertension, Kidney stone, Migraine, No blood products, Papilledema, both eyes, PONV (postoperative nausea and vomiting), Postgastrectomy malabsorption, Presence of lumboperitoneal shunt, Rotator cuff tendinitis, and Visual field defect    She   Patient Active Problem List    Diagnosis Date Noted   • COVID-19 virus infection 11/10/2022   • Chronic idiopathic constipation 09/23/2022   • Macromastia 04/01/2022   • S/P bilateral breast reduction 04/01/2022   • Annual physical exam 12/17/2021   • COVID-19 vaccination refused 12/17/2021   • Iron deficiency anemia secondary to inadequate dietary iron intake 11/09/2021   • Colon cancer screening 10/21/2021   • Anemia 08/22/2021   • Migraine with aura and without status migrainosus, not intractable 04/30/2021   • Heart burn 09/24/2020   • Encounter for surgical aftercare following surgery of digestive system 09/24/2020   • Hydroureteronephrosis 06/06/2020   • Papilledema 12/10/2019   • Postgastrectomy malabsorption 09/20/2019   • History of idiopathic intracranial hypertension 03/08/2019   • Chronic tension-type headache, intractable 03/08/2019   • PTSD (post-traumatic stress disorder) 01/10/2019   • Renal calculi 10/04/2018   • Refusal of influenza vaccine by provider 09/28/2018   • Bariatric surgery status 06/22/2018   • Moderate protein-calorie malnutrition ([facility] [address] ) 02/21/2018   • Choroidal nevus, right eye 02/03/2018   • Refusal of blood product 01/31/2018   • Murmur, cardiac 01/26/2018   • S/P  shunt 01/24/2018   • Gastroesophageal "reflux disease 12/15/2017   • Hypercholesteremia 08/11/2017   • Pseudotumor cerebri 05/31/2017   • Mixed incontinence 04/03/2017   • Subacromial bursitis of right shoulder joint 09/16/2015   • Family history of malignant neoplasm of gastrointestinal tract 07/09/2013     She  has a past surgical history that includes CSF shunt; Gastric bypass (12/19/2016); Hysterectomy (03/14/2013); pr crtj shunt xryvwtlnjq-iivwvwylg-sqanylb terminus (Right, 05/31/2017); ESOPHAGOSCOPY WITH STENT INSERTION (N/A, 01/24/2018); Eye surgery (Bilateral, 1980); pr rplcmt/revj csf shunt valve/cath shunt sys (Right, 01/25/2018); LAPAROTOMY (N/A, 01/25/2018); pr rmvl compl csf shunt system w/o rplcmt shunt (Right, 02/05/2018); Esophagogastroduodenoscopy (N/A, 02/23/2018); Esophagogastroduodenoscopy (N/A, 02/23/2018); Esophagogastroduodenoscopy (N/A, 03/28/2018); Esophagogastroduodenoscopy (N/A, 04/05/2018); Esophagogastroduodenoscopy (N/A, 04/10/2018); pr egd transoral biopsy single/multiple (N/A, 06/27/2018); IR lumbar puncture (08/29/2018); Lumbar peritoneal shunt (11/19/2015); Wrist surgery (Right); pr crtj shunt daxwsqamoi-bnj-qpv-aur (Right, 12/18/2019); pr cysto bladder w/ureteral catheterization (N/A, 06/06/2020); Hiatal hernia repair; [place] retrograde pyelogram (06/24/2020); pr cysto/uretero w/lithotripsy &indwell stent insrt (Bilateral, 06/24/2020); pr cysto/uretero w/lithotripsy &indwell stent insrt (Left, 08/21/2021); FL retrograde pyelogram (08/21/2021); Breast biopsy (Left, 1993); and pr breast reduction (Bilateral, 03/25/2022)       Review of Systems   All other systems reviewed and are negative  Objective:      /83 (BP Location: Left arm, Patient Position: Sitting, Cuff Size: Standard)   Pulse 65   Ht 5' 3\" (1 6 m)   Wt 68 2 kg (150 lb 6 4 oz)   LMP  (LMP Unknown)   BMI 26 64 kg/m²         Physical Exam  Vitals reviewed  Cardiovascular:      Rate and Rhythm: Normal rate  Pulses: Normal pulses   " Musculoskeletal:         General: Tenderness present  Right foot: Tenderness present  Comments: Tenderness to touch noted distal fifth digit just proximal to the nail plate  Toenail does not appear to be ingrown  No apparent clinical prominence noted  Skin:     General: Skin is warm  Capillary Refill: Capillary refill takes less than 2 seconds  Neurological:      General: No focal deficit present  Mental Status: She is alert     Psychiatric:         Mood and Affect: Mood normal

## 2023-05-03 ENCOUNTER — HOSPITAL ENCOUNTER (INPATIENT)
Facility: HOSPITAL | Age: 48
LOS: 2 days | Discharge: HOME | End: 2023-05-07
Attending: EMERGENCY MEDICINE | Admitting: HOSPITALIST

## 2023-05-03 ENCOUNTER — APPOINTMENT (EMERGENCY)
Dept: CT IMAGING | Facility: HOSPITAL | Age: 48
End: 2023-05-03

## 2023-05-03 DIAGNOSIS — N20.1 OBSTRUCTION OF LEFT URETEROPELVIC JUNCTION (UPJ) DUE TO STONE: ICD-10-CM

## 2023-05-03 DIAGNOSIS — N20.1 RIGHT URETERAL STONE: ICD-10-CM

## 2023-05-03 DIAGNOSIS — N20.1 URETEROLITHIASIS: Primary | ICD-10-CM

## 2023-05-03 PROBLEM — R12 HEART BURN: Status: RESOLVED | Noted: 2020-09-24 | Resolved: 2023-05-03

## 2023-05-03 PROBLEM — Z28.21 COVID-19 VACCINATION REFUSED: Status: RESOLVED | Noted: 2021-12-17 | Resolved: 2023-05-03

## 2023-05-03 PROBLEM — U07.1 COVID-19 VIRUS INFECTION: Status: RESOLVED | Noted: 2022-11-10 | Resolved: 2023-05-03

## 2023-05-03 PROBLEM — N13.30 HYDROURETERONEPHROSIS: Status: RESOLVED | Noted: 2020-06-06 | Resolved: 2023-05-03

## 2023-05-03 PROBLEM — D64.9 ANEMIA: Status: RESOLVED | Noted: 2021-08-22 | Resolved: 2023-05-03

## 2023-05-03 LAB
ANION GAP SERPL CALCULATED.3IONS-SCNC: 8 MMOL/L (ref 4–13)
BASOPHILS # BLD AUTO: 0.04 THOUSANDS/ÂΜL (ref 0–0.1)
BASOPHILS NFR BLD AUTO: 1 % (ref 0–1)
BILIRUB UR QL STRIP: NEGATIVE
BUN SERPL-MCNC: 14 MG/DL (ref 5–25)
CALCIUM SERPL-MCNC: 9.5 MG/DL (ref 8.4–10.2)
CHLORIDE SERPL-SCNC: 103 MMOL/L (ref 96–108)
CLARITY UR: ABNORMAL
CO2 SERPL-SCNC: 26 MMOL/L (ref 21–32)
COLOR UR: YELLOW
CREAT SERPL-MCNC: 0.72 MG/DL (ref 0.6–1.3)
EOSINOPHIL # BLD AUTO: 0.35 THOUSAND/ÂΜL (ref 0–0.61)
EOSINOPHIL NFR BLD AUTO: 5 % (ref 0–6)
ERYTHROCYTE [DISTWIDTH] IN BLOOD BY AUTOMATED COUNT: 13.1 % (ref 11.6–15.1)
GFR SERPL CREATININE-BSD FRML MDRD: 99 ML/MIN/1.73SQ M
GLUCOSE SERPL-MCNC: 110 MG/DL (ref 65–140)
GLUCOSE UR STRIP-MCNC: NEGATIVE MG/DL
HCG SERPL QL: NEGATIVE
HCT VFR BLD AUTO: 49.9 % (ref 34.8–46.1)
HGB BLD-MCNC: 16.9 G/DL (ref 11.5–15.4)
HGB UR QL STRIP.AUTO: ABNORMAL
IMM GRANULOCYTES # BLD AUTO: 0.02 THOUSAND/UL (ref 0–0.2)
IMM GRANULOCYTES NFR BLD AUTO: 0 % (ref 0–2)
KETONES UR STRIP-MCNC: ABNORMAL MG/DL
LEUKOCYTE ESTERASE UR QL STRIP: ABNORMAL
LYMPHOCYTES # BLD AUTO: 1.74 THOUSANDS/ÂΜL (ref 0.6–4.47)
LYMPHOCYTES NFR BLD AUTO: 26 % (ref 14–44)
MAGNESIUM SERPL-MCNC: 2.1 MG/DL (ref 1.9–2.7)
MCH RBC QN AUTO: 30.4 PG (ref 26.8–34.3)
MCHC RBC AUTO-ENTMCNC: 33.9 G/DL (ref 31.4–37.4)
MCV RBC AUTO: 90 FL (ref 82–98)
MONOCYTES # BLD AUTO: 0.4 THOUSAND/ÂΜL (ref 0.17–1.22)
MONOCYTES NFR BLD AUTO: 6 % (ref 4–12)
NEUTROPHILS # BLD AUTO: 4.15 THOUSANDS/ÂΜL (ref 1.85–7.62)
NEUTS SEG NFR BLD AUTO: 62 % (ref 43–75)
NITRITE UR QL STRIP: NEGATIVE
NRBC BLD AUTO-RTO: 0 /100 WBCS
PH UR STRIP.AUTO: 7 [PH]
PLATELET # BLD AUTO: 208 THOUSANDS/UL (ref 149–390)
PMV BLD AUTO: 9.4 FL (ref 8.9–12.7)
POTASSIUM SERPL-SCNC: 4.6 MMOL/L (ref 3.5–5.3)
PROT UR STRIP-MCNC: NEGATIVE MG/DL
RBC # BLD AUTO: 5.56 MILLION/UL (ref 3.81–5.12)
SODIUM SERPL-SCNC: 137 MMOL/L (ref 135–147)
SP GR UR STRIP.AUTO: 1.02
UROBILINOGEN UR QL STRIP.AUTO: 0.2 E.U./DL
WBC # BLD AUTO: 6.7 THOUSAND/UL (ref 4.31–10.16)

## 2023-05-03 RX ORDER — FAMOTIDINE 10 MG/ML
20 INJECTION, SOLUTION INTRAVENOUS
Status: DISCONTINUED | OUTPATIENT
Start: 2023-05-03 | End: 2023-05-07 | Stop reason: HOSPADM

## 2023-05-03 RX ORDER — HYDROMORPHONE HCL/PF 1 MG/ML
0.5 SYRINGE (ML) INJECTION ONCE
Status: COMPLETED | OUTPATIENT
Start: 2023-05-03 | End: 2023-05-03

## 2023-05-03 RX ORDER — ONDANSETRON 2 MG/ML
4 INJECTION INTRAMUSCULAR; INTRAVENOUS ONCE
Status: COMPLETED | OUTPATIENT
Start: 2023-05-03 | End: 2023-05-03

## 2023-05-03 RX ORDER — PANTOPRAZOLE SODIUM 40 MG/10ML
40 INJECTION, POWDER, LYOPHILIZED, FOR SOLUTION INTRAVENOUS
Status: DISCONTINUED | OUTPATIENT
Start: 2023-05-04 | End: 2023-05-07 | Stop reason: HOSPADM

## 2023-05-03 RX ORDER — KETOROLAC TROMETHAMINE 30 MG/ML
15 INJECTION, SOLUTION INTRAMUSCULAR; INTRAVENOUS ONCE
Status: COMPLETED | OUTPATIENT
Start: 2023-05-03 | End: 2023-05-03

## 2023-05-03 RX ORDER — ONDANSETRON 2 MG/ML
4 INJECTION INTRAMUSCULAR; INTRAVENOUS EVERY 6 HOURS PRN
Status: DISCONTINUED | OUTPATIENT
Start: 2023-05-03 | End: 2023-05-07 | Stop reason: HOSPADM

## 2023-05-03 RX ORDER — SODIUM CHLORIDE 9 MG/ML
150 INJECTION, SOLUTION INTRAVENOUS CONTINUOUS
Status: DISCONTINUED | OUTPATIENT
Start: 2023-05-03 | End: 2023-05-04

## 2023-05-03 RX ORDER — FAMOTIDINE 20 MG/1
20 TABLET, FILM COATED ORAL
Status: DISCONTINUED | OUTPATIENT
Start: 2023-05-03 | End: 2023-05-03

## 2023-05-03 RX ORDER — ACETAMINOPHEN 325 MG/1
650 TABLET ORAL EVERY 4 HOURS PRN
Status: DISCONTINUED | OUTPATIENT
Start: 2023-05-03 | End: 2023-05-07 | Stop reason: HOSPADM

## 2023-05-03 RX ORDER — OXYCODONE HYDROCHLORIDE AND ACETAMINOPHEN 5; 325 MG/1; MG/1
1 TABLET ORAL ONCE
Status: COMPLETED | OUTPATIENT
Start: 2023-05-03 | End: 2023-05-03

## 2023-05-03 RX ORDER — TAMSULOSIN HYDROCHLORIDE 0.4 MG/1
0.4 CAPSULE ORAL ONCE
Status: COMPLETED | OUTPATIENT
Start: 2023-05-03 | End: 2023-05-03

## 2023-05-03 RX ORDER — TAMSULOSIN HYDROCHLORIDE 0.4 MG/1
0.4 CAPSULE ORAL
Status: DISCONTINUED | OUTPATIENT
Start: 2023-05-04 | End: 2023-05-07 | Stop reason: HOSPADM

## 2023-05-03 RX ORDER — OXYCODONE HYDROCHLORIDE 10 MG/1
10 TABLET ORAL EVERY 4 HOURS PRN
Status: DISCONTINUED | OUTPATIENT
Start: 2023-05-03 | End: 2023-05-03

## 2023-05-03 RX ORDER — OXYCODONE HYDROCHLORIDE 5 MG/1
5 TABLET ORAL EVERY 4 HOURS PRN
Status: DISCONTINUED | OUTPATIENT
Start: 2023-05-03 | End: 2023-05-07 | Stop reason: HOSPADM

## 2023-05-03 RX ORDER — DIAZEPAM 5 MG/ML
5 INJECTION, SOLUTION INTRAMUSCULAR; INTRAVENOUS EVERY 8 HOURS PRN
Status: DISCONTINUED | OUTPATIENT
Start: 2023-05-03 | End: 2023-05-07 | Stop reason: HOSPADM

## 2023-05-03 RX ORDER — HYDROMORPHONE HCL/PF 1 MG/ML
1 SYRINGE (ML) INJECTION
Status: DISCONTINUED | OUTPATIENT
Start: 2023-05-03 | End: 2023-05-05

## 2023-05-03 RX ADMIN — HYDROMORPHONE HYDROCHLORIDE 0.5 MG: 1 INJECTION, SOLUTION INTRAMUSCULAR; INTRAVENOUS; SUBCUTANEOUS at 20:42

## 2023-05-03 RX ADMIN — SODIUM CHLORIDE 150 ML/HR: 0.9 INJECTION, SOLUTION INTRAVENOUS at 21:33

## 2023-05-03 RX ADMIN — KETOROLAC TROMETHAMINE 15 MG: 30 INJECTION, SOLUTION INTRAMUSCULAR; INTRAVENOUS at 17:44

## 2023-05-03 RX ADMIN — HYDROMORPHONE HYDROCHLORIDE 1 MG: 1 INJECTION, SOLUTION INTRAMUSCULAR; INTRAVENOUS; SUBCUTANEOUS at 23:14

## 2023-05-03 RX ADMIN — HYDROMORPHONE HYDROCHLORIDE 0.5 MG: 1 INJECTION, SOLUTION INTRAMUSCULAR; INTRAVENOUS; SUBCUTANEOUS at 17:43

## 2023-05-03 RX ADMIN — HYDROMORPHONE HYDROCHLORIDE 0.5 MG: 1 INJECTION, SOLUTION INTRAMUSCULAR; INTRAVENOUS; SUBCUTANEOUS at 18:25

## 2023-05-03 RX ADMIN — TAMSULOSIN HYDROCHLORIDE 0.4 MG: 0.4 CAPSULE ORAL at 18:29

## 2023-05-03 RX ADMIN — OXYCODONE HYDROCHLORIDE AND ACETAMINOPHEN 1 TABLET: 5; 325 TABLET ORAL at 20:42

## 2023-05-03 RX ADMIN — FAMOTIDINE 20 MG: 10 INJECTION INTRAVENOUS at 23:14

## 2023-05-03 RX ADMIN — DIAZEPAM 5 MG: 5 INJECTION INTRAMUSCULAR; INTRAVENOUS at 23:14

## 2023-05-03 RX ADMIN — SODIUM CHLORIDE 1000 ML: 0.9 INJECTION, SOLUTION INTRAVENOUS at 17:46

## 2023-05-03 RX ADMIN — MORPHINE SULFATE 2 MG: 2 INJECTION, SOLUTION INTRAMUSCULAR; INTRAVENOUS at 21:26

## 2023-05-03 RX ADMIN — ONDANSETRON 4 MG: 2 INJECTION INTRAMUSCULAR; INTRAVENOUS at 17:40

## 2023-05-03 RX ADMIN — ONDANSETRON 4 MG: 2 INJECTION INTRAMUSCULAR; INTRAVENOUS at 21:26

## 2023-05-03 NOTE — ASSESSMENT & PLAN NOTE
Patient presented with left flank pain radiating to groin and vomiting, has hx of nephrolitiasis  Received multiple doses of medication for pain control in ED  · Urology consult  · IVF  · Pain control  · Flomax  · Strain urine  · CT: There is an obstructing 7 mm stone in the left UPJ  Urology consultation advised

## 2023-05-03 NOTE — ED PROVIDER NOTES
History  Chief Complaint   Patient presents with    Flank Pain     Left flank pain into abd for past 40 minutes  Hx of kidney stones  Vomiting at present  41-year-old female with history of nephrolithiasis presents with left-sided flank pain rating to left groin starting prior to arrival   She has had associated onset of nausea and vomiting with it  Patient says that similar to prior kidney stones  Denies fevers or chills  Prior to Admission Medications   Prescriptions Last Dose Informant Patient Reported? Taking?    Biotin 1 MG CAPS   Yes No   Sig: Take 1 mg by mouth daily     Multiple Vitamin (MULTIVITAMIN) tablet   Yes No   Sig: Take 1 tablet by mouth daily Wears a patch   Multiple Vitamins-Minerals (Hair/Skin/Nails) CAPS   Yes No   Sig: Take 1 tablet by mouth 2 (two) times a day     ascorbic acid (VITAMIN C) 250 MG CHEW   Yes No   Sig: Chew 250 mg daily   calcium citrate-vitamin D (CITRACAL+D) 315-200 MG-UNIT per tablet   Yes No   Sig: Take 1 tablet by mouth 2 (two) times a day   famotidine (PEPCID) 40 MG tablet   No No   Sig: take 1 tablet by mouth once daily   ferrous gluconate 324 (37 5 Fe) mg   Yes No   Sig: Take 324 mg by mouth 2 (two) times a day before meals   folic acid (FOLVITE) 1 mg tablet   No No   Sig: Take 2 tablets (2 mg total) by mouth daily   omeprazole (PriLOSEC) 40 MG capsule   No No   Sig: take 1 capsule by mouth twice a day   vitamin B-12 (VITAMIN B-12) 500 mcg tablet   Yes No   Sig: Take 500 mcg by mouth daily      Facility-Administered Medications: None       Past Medical History:   Diagnosis Date    Abdominal pain     Acute cystitis with hematuria 1/3/2020    Brain condition     Pseudotumor Cerebri     Chronic kidney disease     COVID-19 virus infection 11/10/2022    De Quervain's disease (tenosynovitis)     Esophageal perforation     Gastric leak     GERD (gastroesophageal reflux disease)     Headache     Idiopathic intracranial hypertension     Kidney stone "   Migraine     No blood products     per pt: personal and Scientologist beliefs  surgeon office aware 12/13/19    Papilledema, both eyes     PONV (postoperative nausea and vomiting)     Postgastrectomy malabsorption     Presence of lumboperitoneal shunt     Resolved: Sep 20, 2017    Rotator cuff tendinitis     Resolved: Aug 23, 2017    Visual field defect        Past Surgical History:   Procedure Laterality Date    BREAST BIOPSY Left 1993    benign    CSF SHUNT      LP shunt - 2015 -  shunt - 2017    ESOPHAGOGASTRODUODENOSCOPY N/A 02/23/2018    Procedure: ESOPHAGOGASTRODUODENOSCOPY (EGD) WITH ESOPHAGEAL STENT PLACEMENT;  Surgeon: Diamond James MD;  [facility];  Service: Thoracic    ESOPHAGOGASTRODUODENOSCOPY N/A 02/23/2018    Procedure: ESOPHAGOGASTRODUODENOSCOPY (EGD) WITH REMOVAL ESOPHAGEAL STENT  AND REPLACEMENT WITH 23mm X155mm [address];  Surgeon: Diamond James MD;  [facility];  Service: Thoracic    ESOPHAGOGASTRODUODENOSCOPY N/A 03/28/2018    Procedure: ESOPHAGOGASTRODUODENOSCOPY (EGD) with PEJ placement ;  Surgeon: Ismael Mahoney MD;  [facility]; Service: Gastroenterology    ESOPHAGOGASTRODUODENOSCOPY N/A 04/05/2018    Procedure: ESOPHAGOGASTRODUODENOSCOPY (EGD) with botox injection and kaofed placement;  Surgeon: Mary Curran DO;  [facility]; Service: Gastroenterology    ESOPHAGOGASTRODUODENOSCOPY N/A 04/10/2018    Procedure: ESOPHAGOGASTRODUODENOSCOPY (EGD) with Kaofed placement;  Surgeon: Sonya Lopez MD;  [facility]; Service: Gastroenterology    ESOPHAGOSCOPY WITH STENT INSERTION N/A 01/24/2018    Procedure: INSERTION STENT ESOPHAGEAL;  Surgeon: Fabiola Zuniga MD;  [facility];   Service: Gastroenterology    EYE SURGERY Bilateral 1980    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)     FL RETROGRADE PYELOGRAM  06/24/2020    FL RETROGRADE PYELOGRAM  08/21/2021    GASTRIC BYPASS  12/19/2016    Lap " sleeve gastrectomy w/shunt length shortening procedure    HIATAL HERNIA REPAIR      HYSTERECTOMY  03/14/2013    IR LUMBAR PUNCTURE  08/29/2018    LAPAROTOMY N/A 01/25/2018    Procedure: Exploratory Laparotomy, wash out,placement of drains, placement of NG feeding tube ;   Surgeon: Dina Kwan DO;  [facility];  Service: General    LUMBAR PERITONEAL SHUNT  11/19/2015    Laparoscopic assisted    WI BREAST REDUCTION Bilateral 03/25/2022    Procedure: BILATERAL BREAST REDUCTION;  Surgeon: Charles Wallace MD;  [facility];  Service: Plastics    WI CRTJ SHUNT [id number] Right 12/18/2019    Procedure: IMAGE GUIDED INSERTION OF RIGHT CORONAL VENTRICULAR-ATRIAL SHUNT;  Surgeon: Kirk Suarez MD;  [facility];  Service: Neurosurgery    WI [place] TERMINUS Right 05/31/2017    Procedure: IMAGE GUIDED CORONAL PLACEMENT OF PROGRAMABLE VENTRICULAR-PERITONEAL SHUNT, REMOVAL OF LP SHUNT ;  Surgeon: Kirk Suarez MD;  [facility];  Service: Neurosurgery    WI CYSTO BLADDER W/URETERAL CATHETERIZATION N/A 06/06/2020    Procedure: Bilateral CYSTOSCOPY RETROGRADE PYELOGRAM WITH INSERTION STENT URETERAL;  Surgeon: Rayshawn Beck MD;  Location: [address] MAIN OR;  Service: Urology    WI CYSTO/URETERO W/LITHOTRIPSY &INDWELL STENT INSRT Bilateral 06/24/2020    Procedure: CYSTOSCOPY URETEROSCOPY WITH LITHOTRIPSY HOLMIUM LASER, RETROGRADE PYELOGRAM AND exchange bilateral  STENTs URETERAL;  Surgeon: Justin Galeas MD;  [facility];  Service: Urology    WI CYSTO/URETERO W/LITHOTRIPSY &INDWELL STENT INSRT Left 08/21/2021    Procedure: CYSTOSCOPY; LEFT URETEROSCOPY WITH RETROGRADE PYELOGRAM, REMOVAL OF STONE AND INSERTION LEFT URETERAL STENT;  Surgeon: Judy Abdalla MD;  [facility];  Service: Urology    WI EGD TRANSORAL BIOPSY SINGLE/MULTIPLE N/A 06/27/2018    Procedure: ESOPHAGOGASTRODUODENOSCOPY (EGD) with padlock clip placement; Surgeon: Nilda Smith MD;  [facility]; Service: Bariatrics    DC RMVL COMPL CSF SHUNT SYSTEM W/O RPLCMT SHUNT Right 02/05/2018    Procedure: Removal of  shunt;  Surgeon: Edgar Bradley MD;  Location:  MAIN OR;  Service: Neurosurgery    DC RPLCMT/REVJ CSF SHUNT VALVE/CATH SHUNT SYS Right 01/25/2018    Procedure: Externalization of right-sided SHUNT VENTRICULAR-PERITONEAL in anterior chest wall ribs two and three level  ;  Surgeon: Elma Mcconnell MD;  [facility];  Service: Neurosurgery    REDUCTION MAMMAPLASTY Bilateral     WRIST SURGERY Right     x3 2006, 2008       Family History   Problem Relation Age of Onset    Kidney cancer Mother 77    No Known Problems Father     No Known Problems Sister     No Known Problems Daughter     No Known Problems Maternal Grandmother     Colon cancer Maternal Grandfather 44    No Known Problems Paternal Grandmother     Cancer Paternal Grandfather     Thyroid cancer Brother 36    No Known Problems Maternal Aunt     No Known Problems Maternal Aunt     No Known Problems Paternal Aunt     Cancer Family         Gastric    Leukemia Family     Colon cancer Family     Pancreatic cancer Family     Lung cancer Maternal Uncle 64     I have reviewed and agree with the history as documented      E-Cigarette/Vaping    E-Cigarette Use Current Every Day User     Comments medical marijuana      E-Cigarette/Vaping Substances    Nicotine No     THC Yes     CBD Yes     Flavoring No     Other Yes lotion    Unknown No      Social History     Tobacco Use    Smoking status: Former     Packs/day: 0 50     Years: 20 00     Pack years: 10 00     Types: Cigarettes     Quit date: 8/24/2012     Years since quitting: 10 6    Smokeless tobacco: Never   Vaping Use    Vaping Use: Every day    Substances: THC, CBD   Substance Use Topics    Alcohol use: Never    Drug use: Yes     Frequency: 7 0 times per week     Types: Marijuana     Comment: medical marijuana, vaping & topical        Review of Systems    Physical Exam  Physical Exam  Vitals and nursing note reviewed  Constitutional:       Appearance: Normal appearance  She is normal weight  She is ill-appearing  Comments: Uncomfortable   HENT:      Head: Normocephalic and atraumatic  Eyes:      Conjunctiva/sclera: Conjunctivae normal       Pupils: Pupils are equal, round, and reactive to light  Cardiovascular:      Rate and Rhythm: Normal rate and regular rhythm  Pulmonary:      Effort: Pulmonary effort is normal  No respiratory distress  Abdominal:      General: Abdomen is flat  There is no distension  Musculoskeletal:         General: No swelling or deformity  Cervical back: Normal range of motion and neck supple  Skin:     General: Skin is dry  Coloration: Skin is not jaundiced  Neurological:      General: No focal deficit present  Mental Status: She is alert and oriented to person, place, and time  Psychiatric:         Mood and Affect: Mood normal          Thought Content:  Thought content normal          Vital Signs  ED Triage Vitals   Temperature Pulse Respirations Blood Pressure SpO2   05/03/23 1720 05/03/23 1720 05/03/23 1720 05/03/23 1720 05/03/23 1720   98 2 °F (36 8 °C) 62 20 147/75 94 %      Temp Source Heart Rate Source Patient Position - Orthostatic VS BP Location FiO2 (%)   05/03/23 1720 05/03/23 2053 05/03/23 1720 05/03/23 1720 --   Tympanic Monitor Sitting Left arm       Pain Score       05/03/23 1720       10 - Worst Possible Pain           Vitals:    05/03/23 2349 05/03/23 2350 05/04/23 0743 05/04/23 0807   BP: 140/99 140/99 (!) 88/54 95/55   Pulse: 97 103 94 92   Patient Position - Orthostatic VS:   Lying          Visual Acuity      ED Medications  Medications   acetaminophen (TYLENOL) tablet 650 mg (has no administration in time range)   ondansetron (ZOFRAN) injection 4 mg (4 mg Intravenous Given 5/3/23 2126)   tamsulosin (FLOMAX) capsule 0 4 mg (has no administration in time range)   oxyCODONE (ROXICODONE) IR tablet 5 mg (has no administration in time range)   pantoprazole (PROTONIX) injection 40 mg (40 mg Intravenous Given 5/4/23 0908)   diazepam (VALIUM) injection 5 mg (5 mg Intravenous Given 5/3/23 2314)   HYDROmorphone (DILAUDID) injection 1 mg (1 mg Intravenous Given 5/4/23 1124)   Famotidine (PF) (PEPCID) injection 20 mg (20 mg Intravenous Given 5/3/23 2314)   cefTRIAXone (ROCEPHIN) IVPB (premix in dextrose) 1,000 mg 50 mL (1,000 mg Intravenous New Bag 5/4/23 0241)   lactated ringers infusion (100 mL/hr Intravenous New Bag 5/4/23 1112)   ketorolac (TORADOL) injection 15 mg (15 mg Intravenous Given 5/3/23 1744)   HYDROmorphone (DILAUDID) injection 0 5 mg (0 5 mg Intravenous Given 5/3/23 1743)   ondansetron (ZOFRAN) injection 4 mg (4 mg Intravenous Given 5/3/23 1740)   sodium chloride 0 9 % bolus 1,000 mL (0 mL Intravenous Stopped 5/3/23 2134)   HYDROmorphone (DILAUDID) injection 0 5 mg (0 5 mg Intravenous Given 5/3/23 1825)   tamsulosin (FLOMAX) capsule 0 4 mg (0 4 mg Oral Given 5/3/23 1829)   oxyCODONE-acetaminophen (PERCOCET) 5-325 mg per tablet 1 tablet (1 tablet Oral Given 5/3/23 2042)   HYDROmorphone (DILAUDID) injection 0 5 mg (0 5 mg Intravenous Given 5/3/23 2042)       Diagnostic Studies  Results Reviewed     Procedure Component Value Units Date/Time    CBC (With Platelets) [[id number]]  (Abnormal) Collected: 05/04/23 0520    Lab Status: Final result Specimen: Blood from Arm, Left Updated: 05/04/23 0650     WBC 5 28 Thousand/uL      RBC 4 90 Million/uL      Hemoglobin 14 7 g/dL      Hematocrit 44 7 %      MCV 91 fL      MCH 30 0 pg      MCHC 32 9 g/dL      RDW 13 1 %      Platelets 833 Thousands/uL      MPV 9 6 fL     Basic metabolic panel [[id number]]  (Abnormal) Collected: 05/04/23 0520    Lab Status: Final result Specimen: Blood from Arm, Left Updated: 05/04/23 0558     Sodium 138 mmol/L      Potassium 3 3 mmol/L      Chloride 106 mmol/L      CO2 24 mmol/L      ANION GAP 8 mmol/L      BUN 15 mg/dL      Creatinine 0 97 mg/dL      Glucose 143 mg/dL      Glucose, Fasting 143 mg/dL      Calcium 8 3 mg/dL      eGFR 69 ml/min/1 73sq m     Narrative:      [place] guidelines for Chronic Kidney Disease (CKD):     Stage 1 with normal or high GFR (GFR > 90 mL/min/1 73 square meters)    Stage 2 Mild CKD (GFR = 60-89 mL/min/1 73 square meters)    Stage 3A Moderate CKD (GFR = 45-59 mL/min/1 73 square meters)    Stage 3B Moderate CKD (GFR = 30-44 mL/min/1 73 square meters)    Stage 4 Severe CKD (GFR = 15-29 mL/min/1 73 square meters)    Stage 5 End Stage CKD (GFR <15 mL/min/1 73 square meters)  Note: GFR calculation is accurate only with a steady state creatinine    Urine Microscopic [[id number]]  (Abnormal) Collected: 05/03/23 2313    Lab Status: Final result Specimen: Urine, Clean Catch Updated: 05/04/23 0019     RBC, UA 1-2 /hpf      WBC, UA 20-30 /hpf      Epithelial Cells Occasional /hpf      Bacteria, UA Innumerable /hpf      OTHER OBSERVATIONS WBCs Clumped    Urine culture [[id number]] Collected: 05/03/23 2313    Lab Status:  In process Specimen: Urine, Clean Catch Updated: 05/04/23 0019    UA w Reflex to Microscopic w Reflex to Culture [[id number]]  (Abnormal) Collected: 05/03/23 2313    Lab Status: Final result Specimen: Urine, Clean Catch Updated: 05/03/23 2329     Color, UA Yellow     Clarity, UA Slightly Cloudy     Specific [place], UA 1 020     pH, UA 7 0     Leukocytes, UA 1+     Nitrite, UA Negative     Protein, UA Negative mg/dl      Glucose, UA Negative mg/dl      Ketones, UA 40 (2+) mg/dl      Urobilinogen, UA 0 2 E U /dl      Bilirubin, UA Negative     Occult Blood, UA 2+    hCG, qualitative pregnancy [[id number]]  (Normal) Collected: 05/03/23 1739    Lab Status: Final result Specimen: Blood from Arm, Right Updated: 05/03/23 1811     Preg, Serum Negative    Basic metabolic panel [[id number]] Collected: 05/03/23 1739    Lab Status: Final result Specimen: Blood from Arm, Right Updated: 05/03/23 1803     Sodium 137 mmol/L      Potassium 4 6 mmol/L      Chloride 103 mmol/L      CO2 26 mmol/L      ANION GAP 8 mmol/L      BUN 14 mg/dL      Creatinine 0 72 mg/dL      Glucose 110 mg/dL      Calcium 9 5 mg/dL      eGFR 99 ml/min/1 73sq m     Narrative:      [place] guidelines for Chronic Kidney Disease (CKD):     Stage 1 with normal or high GFR (GFR > 90 mL/min/1 73 square meters)    Stage 2 Mild CKD (GFR = 60-89 mL/min/1 73 square meters)    Stage 3A Moderate CKD (GFR = 45-59 mL/min/1 73 square meters)    Stage 3B Moderate CKD (GFR = 30-44 mL/min/1 73 square meters)    Stage 4 Severe CKD (GFR = 15-29 mL/min/1 73 square meters)    Stage 5 End Stage CKD (GFR <15 mL/min/1 73 square meters)  Note: GFR calculation is accurate only with a steady state creatinine    Magnesium [[id number]]  (Normal) Collected: 05/03/23 1739    Lab Status: Final result Specimen: Blood from Arm, Right Updated: 05/03/23 1803     Magnesium 2 1 mg/dL     CBC and differential [[id number]]  (Abnormal) Collected: 05/03/23 1739    Lab Status: Final result Specimen: Blood from Arm, Right Updated: 05/03/23 1745     WBC 6 70 Thousand/uL      RBC 5 56 Million/uL      Hemoglobin 16 9 g/dL      Hematocrit 49 9 %      MCV 90 fL      MCH 30 4 pg      MCHC 33 9 g/dL      RDW 13 1 %      MPV 9 4 fL      Platelets 021 Thousands/uL      nRBC 0 /100 WBCs      Neutrophils Relative 62 %      Immat GRANS % 0 %      Lymphocytes Relative 26 %      Monocytes Relative 6 %      Eosinophils Relative 5 %      Basophils Relative 1 %      Neutrophils Absolute 4 15 Thousands/µL      Immature Grans Absolute 0 02 Thousand/uL      Lymphocytes Absolute 1 74 Thousands/µL      Monocytes Absolute 0 40 Thousand/µL      Eosinophils Absolute 0 35 Thousand/µL      Basophils Absolute 0 04 Thousands/µL                  CT renal stone study abdomen pelvis wo contrast   Final Result by Bean Cai DELANO Cooley DO (05/03 1918)   There is an obstructing 7 mm stone in the left UPJ  Urology consultation advised  Bilateral nonobstructing renal collecting system calculi  In addition there is a nonobstructing right ureter stone measuring 3 x 4 mm which is apparently asymptomatic  The study was marked in [facility] for immediate notification  Workstation performed: [id number]         XR abdomen 1 view kub    (Results Pending)   XR retrograde pyelogram    (Results Pending)              Procedures  Procedures         ED Course  ED Course as of 05/04/23 1459   Wed May 03, 2023   1747 Patient adamantly denies possibility of pregnancy  1821 CT reviewed and interpreted independently by me  Left proximally ureterolithiasis approximately 7 to 8 mm with mild to moderate hydronephrosis                               SBIRT 20yo+    Flowsheet Row Most Recent Value   Initial Alcohol Screen: US AUDIT-C     1  How often do you have a drink containing alcohol? 0 Filed at: 05/03/2023 1722   Audit-C Score 0 Filed at: 05/03/2023 1722   IMAN: How many times in the past year have you    Used an illegal drug or used a prescription medication for non-medical reasons? Never Filed at: 05/03/2023 1722                    Medical Decision Making  50-year-old female with history of nephrolithiasis presents with left-sided flank discomfort which started 1 hour prior to arrival     Almost certainly ureterolithiasis on the left  Diagnoses also considered: Obstructive stone, urinary tract infection, pyelonephritis  Considered but clinically doubt mesenteric ischemia, renal infarct -may revisit these diagnosis if CT renal stone study is negative  We will also get a pregnancy test to rule out ruptured ectopic  Potentially could be ovarian torsion however also clinically doubt, may reconsider this diagnosis of other testing is negative  We will give IV fluid, Dilaudid, Toradol for pain, Zofran for nausea    She is persistently vomiting in the emergency department  Ureterolithiasis: acute illness or injury  Amount and/or Complexity of Data Reviewed  Labs: ordered  Decision-making details documented in ED Course  Radiology: ordered and independent interpretation performed  Decision-making details documented in ED Course  Risk  Prescription drug management  Decision regarding hospitalization  Disposition  Final diagnoses:   Ureterolithiasis     Time reflects when diagnosis was documented in both MDM as applicable and the Disposition within this note     Time User Action Codes Description Comment    5/3/2023  6:29 PM José Cohen Add [N20 1] Ureterolithiasis     5/3/2023  8:10 PM Yeni Rinne D Add [N20 1] Obstruction of left ureteropelvic junction (UPJ) due to stone     5/3/2023  9:07 PM Yuan Banks Add [N20 1] Right ureteral stone       ED Disposition     ED Disposition   Admit    Condition   Stable    Date/Time   Wed May 3, 2023  6:28 PM    Comment   Case was discussed with Crys Irwin and the patient's admission status was agreed to be Admission Status: observation status to the service of Dr Gari Gottron              Follow-up Information    None         Current Discharge Medication List      CONTINUE these medications which have NOT CHANGED    Details   ascorbic acid (VITAMIN C) 250 MG CHEW Chew 250 mg daily      Biotin 1 MG CAPS Take 1 mg by mouth daily        calcium citrate-vitamin D (CITRACAL+D) 315-200 MG-UNIT per tablet Take 1 tablet by mouth 2 (two) times a day      famotidine (PEPCID) 40 MG tablet take 1 tablet by mouth once daily  Qty: 30 tablet, Refills: 2    Associated Diagnoses: Gastroesophageal reflux disease without esophagitis      ferrous gluconate 324 (37 5 Fe) mg Take 324 mg by mouth 2 (two) times a day before meals      folic acid (FOLVITE) 1 mg tablet Take 2 tablets (2 mg total) by mouth daily  Qty: 180 tablet, Refills: 3    Comments: Dose increased to 2mg  Associated Diagnoses: Folic acid deficiency Multiple Vitamin (MULTIVITAMIN) tablet Take 1 tablet by mouth daily Wears a patch      Multiple Vitamins-Minerals (Hair/Skin/Nails) CAPS Take 1 tablet by mouth 2 (two) times a day        omeprazole (PriLOSEC) 40 MG capsule take 1 capsule by mouth twice a day  Qty: 60 capsule, Refills: 2    Associated Diagnoses: Gastroesophageal reflux disease, unspecified whether esophagitis present      vitamin B-12 (VITAMIN B-12) 500 mcg tablet Take 500 mcg by mouth daily             No discharge procedures on file      PDMP Review       Value Time User    PDMP Reviewed  Yes 6/24/2020  3:21 PM Brayan Zamorano MD          ED Provider  Electronically Signed by           Stuart Barber DO  05/04/23 0174

## 2023-05-04 ENCOUNTER — APPOINTMENT (OUTPATIENT)
Dept: RADIOLOGY | Facility: HOSPITAL | Age: 48
End: 2023-05-04

## 2023-05-04 ENCOUNTER — ANESTHESIA EVENT (OUTPATIENT)
Dept: PERIOP | Facility: HOSPITAL | Age: 48
End: 2023-05-04

## 2023-05-04 ENCOUNTER — ANESTHESIA (OUTPATIENT)
Dept: PERIOP | Facility: HOSPITAL | Age: 48
End: 2023-05-04

## 2023-05-04 PROBLEM — E87.6 HYPOKALEMIA: Status: ACTIVE | Noted: 2023-05-04

## 2023-05-04 LAB
ANION GAP SERPL CALCULATED.3IONS-SCNC: 7 MMOL/L (ref 4–13)
ANION GAP SERPL CALCULATED.3IONS-SCNC: 8 MMOL/L (ref 4–13)
BACTERIA UR QL AUTO: ABNORMAL /HPF
BILIRUB UR QL STRIP: ABNORMAL
BILIRUB UR QL STRIP: NEGATIVE
BILIRUB UR QL STRIP: NEGATIVE
BUN SERPL-MCNC: 15 MG/DL (ref 5–25)
BUN SERPL-MCNC: 15 MG/DL (ref 5–25)
CALCIUM SERPL-MCNC: 8.3 MG/DL (ref 8.4–10.2)
CALCIUM SERPL-MCNC: 8.8 MG/DL (ref 8.4–10.2)
CHLORIDE SERPL-SCNC: 106 MMOL/L (ref 96–108)
CHLORIDE SERPL-SCNC: 106 MMOL/L (ref 96–108)
CLARITY UR: ABNORMAL
CO2 SERPL-SCNC: 24 MMOL/L (ref 21–32)
CO2 SERPL-SCNC: 25 MMOL/L (ref 21–32)
COLOR UR: ABNORMAL
CREAT SERPL-MCNC: 0.78 MG/DL (ref 0.6–1.3)
CREAT SERPL-MCNC: 0.97 MG/DL (ref 0.6–1.3)
ERYTHROCYTE [DISTWIDTH] IN BLOOD BY AUTOMATED COUNT: 13.1 % (ref 11.6–15.1)
GFR SERPL CREATININE-BSD FRML MDRD: 69 ML/MIN/1.73SQ M
GFR SERPL CREATININE-BSD FRML MDRD: 90 ML/MIN/1.73SQ M
GLUCOSE P FAST SERPL-MCNC: 143 MG/DL (ref 65–99)
GLUCOSE P FAST SERPL-MCNC: 97 MG/DL (ref 65–99)
GLUCOSE SERPL-MCNC: 143 MG/DL (ref 65–140)
GLUCOSE SERPL-MCNC: 97 MG/DL (ref 65–140)
GLUCOSE UR STRIP-MCNC: ABNORMAL MG/DL
GLUCOSE UR STRIP-MCNC: NEGATIVE MG/DL
GLUCOSE UR STRIP-MCNC: NEGATIVE MG/DL
HCT VFR BLD AUTO: 44.7 % (ref 34.8–46.1)
HGB BLD-MCNC: 14.7 G/DL (ref 11.5–15.4)
HGB UR QL STRIP.AUTO: ABNORMAL
KETONES UR STRIP-MCNC: ABNORMAL MG/DL
LEUKOCYTE ESTERASE UR QL STRIP: ABNORMAL
LEUKOCYTE ESTERASE UR QL STRIP: ABNORMAL
LEUKOCYTE ESTERASE UR QL STRIP: NEGATIVE
MCH RBC QN AUTO: 30 PG (ref 26.8–34.3)
MCHC RBC AUTO-ENTMCNC: 32.9 G/DL (ref 31.4–37.4)
MCV RBC AUTO: 91 FL (ref 82–98)
MUCOUS THREADS UR QL AUTO: ABNORMAL
NITRITE UR QL STRIP: NEGATIVE
NITRITE UR QL STRIP: NEGATIVE
NITRITE UR QL STRIP: POSITIVE
NON-SQ EPI CELLS URNS QL MICRO: ABNORMAL /HPF
OTHER STN SPEC: ABNORMAL
PH UR STRIP.AUTO: 5.5 [PH]
PH UR STRIP.AUTO: 5.5 [PH]
PH UR STRIP.AUTO: 6 [PH]
PLATELET # BLD AUTO: 120 THOUSANDS/UL (ref 149–390)
PMV BLD AUTO: 9.6 FL (ref 8.9–12.7)
POTASSIUM SERPL-SCNC: 3.3 MMOL/L (ref 3.5–5.3)
POTASSIUM SERPL-SCNC: 3.9 MMOL/L (ref 3.5–5.3)
PROT UR STRIP-MCNC: ABNORMAL MG/DL
RBC # BLD AUTO: 4.9 MILLION/UL (ref 3.81–5.12)
RBC #/AREA URNS AUTO: ABNORMAL /HPF
SODIUM SERPL-SCNC: 138 MMOL/L (ref 135–147)
SODIUM SERPL-SCNC: 138 MMOL/L (ref 135–147)
SP GR UR STRIP.AUTO: 1.01
SP GR UR STRIP.AUTO: 1.02
SP GR UR STRIP.AUTO: >=1.03
UROBILINOGEN UR QL STRIP.AUTO: 0.2 E.U./DL
WBC # BLD AUTO: 5.28 THOUSAND/UL (ref 4.31–10.16)
WBC #/AREA URNS AUTO: ABNORMAL /HPF

## 2023-05-04 PROCEDURE — BT141ZZ FLUOROSCOPY OF KIDNEYS, URETERS AND BLADDER USING LOW OSMOLAR CONTRAST: ICD-10-PCS | Performed by: RADIOLOGY

## 2023-05-04 PROCEDURE — 0T788DZ DILATION OF BILATERAL URETERS WITH INTRALUMINAL DEVICE, VIA NATURAL OR ARTIFICIAL OPENING ENDOSCOPIC: ICD-10-PCS | Performed by: UROLOGY

## 2023-05-04 DEVICE — INLAY OPTIMA URETERAL STENT W/O GUIDEWIRE
Type: IMPLANTABLE DEVICE | Site: URETER | Status: NON-FUNCTIONAL
Brand: BARD® INLAY OPTIMA® URETERAL STENT
Removed: 2023-05-18

## 2023-05-04 RX ORDER — METOCLOPRAMIDE HYDROCHLORIDE 5 MG/ML
10 INJECTION INTRAMUSCULAR; INTRAVENOUS ONCE AS NEEDED
Status: DISCONTINUED | OUTPATIENT
Start: 2023-05-04 | End: 2023-05-04 | Stop reason: HOSPADM

## 2023-05-04 RX ORDER — MAGNESIUM HYDROXIDE 1200 MG/15ML
LIQUID ORAL AS NEEDED
Status: DISCONTINUED | OUTPATIENT
Start: 2023-05-04 | End: 2023-05-04 | Stop reason: HOSPADM

## 2023-05-04 RX ORDER — FENTANYL CITRATE 50 UG/ML
INJECTION, SOLUTION INTRAMUSCULAR; INTRAVENOUS AS NEEDED
Status: DISCONTINUED | OUTPATIENT
Start: 2023-05-04 | End: 2023-05-04

## 2023-05-04 RX ORDER — SODIUM CHLORIDE, SODIUM LACTATE, POTASSIUM CHLORIDE, CALCIUM CHLORIDE 600; 310; 30; 20 MG/100ML; MG/100ML; MG/100ML; MG/100ML
20 INJECTION, SOLUTION INTRAVENOUS CONTINUOUS
Status: DISCONTINUED | OUTPATIENT
Start: 2023-05-04 | End: 2023-05-04

## 2023-05-04 RX ORDER — SODIUM CHLORIDE, SODIUM LACTATE, POTASSIUM CHLORIDE, CALCIUM CHLORIDE 600; 310; 30; 20 MG/100ML; MG/100ML; MG/100ML; MG/100ML
100 INJECTION, SOLUTION INTRAVENOUS CONTINUOUS
Status: DISCONTINUED | OUTPATIENT
Start: 2023-05-04 | End: 2023-05-07

## 2023-05-04 RX ORDER — GLYCOPYRROLATE 0.2 MG/ML
INJECTION INTRAMUSCULAR; INTRAVENOUS AS NEEDED
Status: DISCONTINUED | OUTPATIENT
Start: 2023-05-04 | End: 2023-05-04

## 2023-05-04 RX ORDER — CEFTRIAXONE 1 G/50ML
1000 INJECTION, SOLUTION INTRAVENOUS EVERY 24 HOURS
Status: DISCONTINUED | OUTPATIENT
Start: 2023-05-04 | End: 2023-05-07

## 2023-05-04 RX ORDER — ONDANSETRON 2 MG/ML
INJECTION INTRAMUSCULAR; INTRAVENOUS AS NEEDED
Status: DISCONTINUED | OUTPATIENT
Start: 2023-05-04 | End: 2023-05-04

## 2023-05-04 RX ORDER — PROPOFOL 10 MG/ML
INJECTION, EMULSION INTRAVENOUS AS NEEDED
Status: DISCONTINUED | OUTPATIENT
Start: 2023-05-04 | End: 2023-05-04

## 2023-05-04 RX ORDER — SUCCINYLCHOLINE/SOD CL,ISO/PF 100 MG/5ML
SYRINGE (ML) INTRAVENOUS AS NEEDED
Status: DISCONTINUED | OUTPATIENT
Start: 2023-05-04 | End: 2023-05-04

## 2023-05-04 RX ORDER — DEXMEDETOMIDINE HYDROCHLORIDE 100 UG/ML
INJECTION, SOLUTION INTRAVENOUS AS NEEDED
Status: DISCONTINUED | OUTPATIENT
Start: 2023-05-04 | End: 2023-05-04

## 2023-05-04 RX ORDER — PROPOFOL 10 MG/ML
INJECTION, EMULSION INTRAVENOUS CONTINUOUS PRN
Status: DISCONTINUED | OUTPATIENT
Start: 2023-05-04 | End: 2023-05-04

## 2023-05-04 RX ORDER — FENTANYL CITRATE 50 UG/ML
INJECTION, SOLUTION INTRAMUSCULAR; INTRAVENOUS
Status: DISPENSED
Start: 2023-05-04 | End: 2023-05-05

## 2023-05-04 RX ORDER — HYDROMORPHONE HCL/PF 1 MG/ML
0.5 SYRINGE (ML) INJECTION
Status: DISCONTINUED | OUTPATIENT
Start: 2023-05-04 | End: 2023-05-04 | Stop reason: HOSPADM

## 2023-05-04 RX ORDER — ROCURONIUM BROMIDE 10 MG/ML
INJECTION, SOLUTION INTRAVENOUS AS NEEDED
Status: DISCONTINUED | OUTPATIENT
Start: 2023-05-04 | End: 2023-05-04

## 2023-05-04 RX ORDER — FENTANYL CITRATE/PF 50 MCG/ML
50 SYRINGE (ML) INJECTION
Status: DISCONTINUED | OUTPATIENT
Start: 2023-05-04 | End: 2023-05-04 | Stop reason: HOSPADM

## 2023-05-04 RX ORDER — FENTANYL CITRATE/PF 50 MCG/ML
25 SYRINGE (ML) INJECTION
Status: DISCONTINUED | OUTPATIENT
Start: 2023-05-04 | End: 2023-05-04 | Stop reason: HOSPADM

## 2023-05-04 RX ORDER — MIDAZOLAM HYDROCHLORIDE 2 MG/2ML
INJECTION, SOLUTION INTRAMUSCULAR; INTRAVENOUS AS NEEDED
Status: DISCONTINUED | OUTPATIENT
Start: 2023-05-04 | End: 2023-05-04

## 2023-05-04 RX ORDER — CEFAZOLIN SODIUM 1 G/3ML
INJECTION, POWDER, FOR SOLUTION INTRAMUSCULAR; INTRAVENOUS AS NEEDED
Status: DISCONTINUED | OUTPATIENT
Start: 2023-05-04 | End: 2023-05-04

## 2023-05-04 RX ORDER — SODIUM CHLORIDE, SODIUM LACTATE, POTASSIUM CHLORIDE, CALCIUM CHLORIDE 600; 310; 30; 20 MG/100ML; MG/100ML; MG/100ML; MG/100ML
INJECTION, SOLUTION INTRAVENOUS CONTINUOUS PRN
Status: DISCONTINUED | OUTPATIENT
Start: 2023-05-04 | End: 2023-05-04

## 2023-05-04 RX ORDER — ONDANSETRON 2 MG/ML
4 INJECTION INTRAMUSCULAR; INTRAVENOUS ONCE AS NEEDED
Status: DISCONTINUED | OUTPATIENT
Start: 2023-05-04 | End: 2023-05-04 | Stop reason: HOSPADM

## 2023-05-04 RX ADMIN — FENTANYL CITRATE 25 MCG: 50 INJECTION, SOLUTION INTRAMUSCULAR; INTRAVENOUS at 18:28

## 2023-05-04 RX ADMIN — DIAZEPAM 5 MG: 5 INJECTION INTRAMUSCULAR; INTRAVENOUS at 23:07

## 2023-05-04 RX ADMIN — Medication 100 MG: at 17:28

## 2023-05-04 RX ADMIN — PHENYLEPHRINE HYDROCHLORIDE 50 MCG/MIN: 10 INJECTION INTRAVENOUS at 17:40

## 2023-05-04 RX ADMIN — HYDROMORPHONE HYDROCHLORIDE 1 MG: 1 INJECTION, SOLUTION INTRAMUSCULAR; INTRAVENOUS; SUBCUTANEOUS at 22:44

## 2023-05-04 RX ADMIN — CEFAZOLIN 2000 MG: 1 INJECTION, POWDER, FOR SOLUTION INTRAMUSCULAR; INTRAVENOUS at 17:36

## 2023-05-04 RX ADMIN — FENTANYL CITRATE 100 MCG: 50 INJECTION, SOLUTION INTRAMUSCULAR; INTRAVENOUS at 17:20

## 2023-05-04 RX ADMIN — SODIUM CHLORIDE, SODIUM LACTATE, POTASSIUM CHLORIDE, AND CALCIUM CHLORIDE 100 ML/HR: .6; .31; .03; .02 INJECTION, SOLUTION INTRAVENOUS at 11:12

## 2023-05-04 RX ADMIN — PROPOFOL 150 MG: 10 INJECTION, EMULSION INTRAVENOUS at 17:28

## 2023-05-04 RX ADMIN — MIDAZOLAM HYDROCHLORIDE 2 MG: 1 INJECTION, SOLUTION INTRAMUSCULAR; INTRAVENOUS at 17:20

## 2023-05-04 RX ADMIN — PROPOFOL 140 MCG/KG/MIN: 10 INJECTION, EMULSION INTRAVENOUS at 17:30

## 2023-05-04 RX ADMIN — ONDANSETRON 4 MG: 2 INJECTION INTRAMUSCULAR; INTRAVENOUS at 19:42

## 2023-05-04 RX ADMIN — SODIUM CHLORIDE, SODIUM LACTATE, POTASSIUM CHLORIDE, AND CALCIUM CHLORIDE: .6; .31; .03; .02 INJECTION, SOLUTION INTRAVENOUS at 17:20

## 2023-05-04 RX ADMIN — OXYCODONE HYDROCHLORIDE 5 MG: 5 TABLET ORAL at 20:52

## 2023-05-04 RX ADMIN — HYDROMORPHONE HYDROCHLORIDE 1 MG: 1 INJECTION, SOLUTION INTRAMUSCULAR; INTRAVENOUS; SUBCUTANEOUS at 19:42

## 2023-05-04 RX ADMIN — CEFTRIAXONE 1000 MG: 1 INJECTION, SOLUTION INTRAVENOUS at 02:41

## 2023-05-04 RX ADMIN — FAMOTIDINE 20 MG: 10 INJECTION INTRAVENOUS at 22:46

## 2023-05-04 RX ADMIN — HYDROMORPHONE HYDROCHLORIDE 1 MG: 1 INJECTION, SOLUTION INTRAMUSCULAR; INTRAVENOUS; SUBCUTANEOUS at 06:41

## 2023-05-04 RX ADMIN — ONDANSETRON 4 MG: 2 INJECTION INTRAMUSCULAR; INTRAVENOUS at 17:28

## 2023-05-04 RX ADMIN — SODIUM CHLORIDE, SODIUM LACTATE, POTASSIUM CHLORIDE, AND CALCIUM CHLORIDE 100 ML/HR: .6; .31; .03; .02 INJECTION, SOLUTION INTRAVENOUS at 20:37

## 2023-05-04 RX ADMIN — FENTANYL CITRATE 50 MCG: 50 INJECTION, SOLUTION INTRAMUSCULAR; INTRAVENOUS at 18:37

## 2023-05-04 RX ADMIN — HYDROMORPHONE HYDROCHLORIDE 1 MG: 1 INJECTION, SOLUTION INTRAMUSCULAR; INTRAVENOUS; SUBCUTANEOUS at 11:24

## 2023-05-04 RX ADMIN — SUGAMMADEX 150 MG: 100 INJECTION, SOLUTION INTRAVENOUS at 18:05

## 2023-05-04 RX ADMIN — GLYCOPYRROLATE 0.2 MG: 0.2 INJECTION INTRAMUSCULAR; INTRAVENOUS at 17:28

## 2023-05-04 RX ADMIN — ROCURONIUM BROMIDE 25 MG: 10 INJECTION INTRAVENOUS at 17:38

## 2023-05-04 RX ADMIN — DEXMEDETOMIDINE HCL 8 MCG: 100 INJECTION INTRAVENOUS at 17:21

## 2023-05-04 RX ADMIN — PANTOPRAZOLE SODIUM 40 MG: 40 INJECTION, POWDER, FOR SOLUTION INTRAVENOUS at 09:08

## 2023-05-04 NOTE — PLAN OF CARE
Problem: INFECTION - ADULT  Goal: Absence or prevention of progression during hospitalization  Description: INTERVENTIONS:  - Assess and monitor for signs and symptoms of infection  - Monitor lab/diagnostic results  - Monitor all insertion sites, i e  indwelling lines, tubes, and drains  - Monitor endotracheal if appropriate and nasal secretions for changes in amount and color  - [place] appropriate cooling/warming therapies per order  - Administer medications as ordered  - Instruct and encourage patient and family to use good hand hygiene technique  - Identify and instruct in appropriate isolation precautions for identified infection/condition  Outcome: Progressing  Goal: Absence of fever/infection during neutropenic period  Description: INTERVENTIONS:  - Monitor WBC    Outcome: Progressing

## 2023-05-04 NOTE — H&P
[facility]  H&P  Name: Prashant Ley 49 y o  female I MRN: [id number]  Unit/Bed#: ED 21 I Date of Admission: 5/3/2023   Date of Service: 5/3/2023 I Hospital Day: 0      Assessment/Plan   * Obstruction of left ureteropelvic junction (UPJ) due to stone  Assessment & Plan  Patient presented with left flank pain radiating to groin and vomiting, has hx of nephrolitiasis  Received multiple doses of medication for pain control in ED  · Urology consult  · IVF  · Pain control  · Flomax  · Strain urine  · CT: There is an obstructing 7 mm stone in the left UPJ  Urology consultation advised  Gastroesophageal reflux disease  Assessment & Plan  · Continue PPI and pepcid - will order IV for now w/ vomiting        VTE Pharmacologic Prophylaxis: VTE Score: 1 Low Risk (Score 0-2) - Encourage Ambulation  Code Status: Level 1 - Full Code   Discussion with patient    Anticipated Length of Stay: Patient will be admitted on an observation basis with an anticipated length of stay of less than 2 midnights secondary to IVF, pain control, specialist input  Total Time Spent on Date of Encounter in care of patient: 75 minutes This time was spent on one or more of the following: performing physical exam; counseling and coordination of care; obtaining or reviewing history; documenting in the medical record; reviewing/ordering tests, medications or procedures; communicating with other healthcare professionals and discussing with patient's family/caregivers  Chief Complaint: left flank pain    History of Present Illness:  Prashant Ley is a 49 y o  female with a PMH of pseudotumor cerebri, history of gastric bypass and padlock clip placement on her stomach, prior admissions for nephrolithiasis who presents with left flank pain  Patient was on Diamox for 5 day  Patient reports pain started around 4:45  She ate dinner and vomited  She has severe left flank pain 10/10 with minimal relief with the meds   She reports last time to urinate was around 5pm   Patient also notes pain in her bilateral lower abdomen    ED treatment including Dilaudid 0 5 mg x 3, Toradol 15 mg x 1 Zofran 4 mg x 1 1 L IV fluids, Flomax 0 4 mg    Review of Systems:  Review of Systems   Constitutional: Negative for chills and fever  HENT: Negative for rhinorrhea and sore throat  Eyes: Negative for discharge and visual disturbance  Respiratory: Negative for cough and shortness of breath  Cardiovascular: Negative for chest pain and palpitations  Gastrointestinal: Positive for abdominal pain, nausea and vomiting  Negative for diarrhea  Genitourinary: Positive for difficulty urinating and flank pain  Negative for hematuria  Musculoskeletal: Positive for back pain and neck pain  Skin: Negative for rash and wound  Neurological: Negative for dizziness, weakness and headaches  Psychiatric/Behavioral: Negative for agitation and confusion  Past Medical and Surgical History:   Past Medical History:   Diagnosis Date    Abdominal pain     Acute cystitis with hematuria 1/3/2020    Brain condition     Pseudotumor Cerebri     Chronic kidney disease     COVID-19 virus infection 11/10/2022    De Quervain's disease (tenosynovitis)     Esophageal perforation     Gastric leak     GERD (gastroesophageal reflux disease)     Headache     Idiopathic intracranial hypertension     Kidney stone     Migraine     No blood products     per pt: personal and Voodoo beliefs   surgeon office aware 12/13/19    Papilledema, both eyes     PONV (postoperative nausea and vomiting)     Postgastrectomy malabsorption     Presence of lumboperitoneal shunt     Resolved: Sep 20, 2017    Rotator cuff tendinitis     Resolved: Aug 23, 2017    Visual field defect        Past Surgical History:   Procedure Laterality Date    BREAST BIOPSY Left 1993    benign    CSF SHUNT      LP shunt - 2015 -  shunt - 2017    ESOPHAGOGASTRODUODENOSCOPY N/A 02/23/2018 "Procedure: ESOPHAGOGASTRODUODENOSCOPY (EGD) WITH ESOPHAGEAL STENT PLACEMENT;  Surgeon: Brianne Marsh MD;  [facility];  Service: Thoracic    ESOPHAGOGASTRODUODENOSCOPY N/A 02/23/2018    Procedure: ESOPHAGOGASTRODUODENOSCOPY (EGD) WITH REMOVAL ESOPHAGEAL STENT  AND REPLACEMENT WITH 23mm X155mm [address];  Surgeon: Brianne Marsh MD;  [facility];  Service: Thoracic    ESOPHAGOGASTRODUODENOSCOPY N/A 03/28/2018    Procedure: ESOPHAGOGASTRODUODENOSCOPY (EGD) with PEJ placement ;  Surgeon: Ned Ayala MD;  [facility]; Service: Gastroenterology    ESOPHAGOGASTRODUODENOSCOPY N/A 04/05/2018    Procedure: ESOPHAGOGASTRODUODENOSCOPY (EGD) with botox injection and kaofed placement;  Surgeon: Tiara Nicole DO;  [facility]; Service: Gastroenterology    ESOPHAGOGASTRODUODENOSCOPY N/A 04/10/2018    Procedure: ESOPHAGOGASTRODUODENOSCOPY (EGD) with Kaofed placement;  Surgeon: Jackie Manning MD;  [facility]; Service: Gastroenterology    ESOPHAGOSCOPY WITH STENT INSERTION N/A 01/24/2018    Procedure: INSERTION STENT ESOPHAGEAL;  Surgeon: Ibeth Merida MD;  [facility]; Service: Gastroenterology    EYE SURGERY Bilateral 1980    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)     FL RETROGRADE PYELOGRAM  06/24/2020    FL RETROGRADE PYELOGRAM  08/21/2021    GASTRIC BYPASS  12/19/2016    Lap sleeve gastrectomy w/shunt length shortening procedure    HIATAL HERNIA REPAIR      HYSTERECTOMY  03/14/2013    IR LUMBAR PUNCTURE  08/29/2018    LAPAROTOMY N/A 01/25/2018    Procedure: Exploratory Laparotomy, wash out,placement of drains, placement of NG feeding tube ;   Surgeon: Sonido Hernandez DO;  [facility];  Service: General    LUMBAR PERITONEAL SHUNT  11/19/2015    Laparoscopic assisted    LA BREAST REDUCTION Bilateral 03/25/2022    Procedure: BILATERAL BREAST REDUCTION;  Surgeon: Rodrigo Thakur MD;  [facility];  Service: " Plastics    MS CRTJ SHUNT [id number] Right 12/18/2019    Procedure: IMAGE GUIDED INSERTION OF RIGHT CORONAL VENTRICULAR-ATRIAL SHUNT;  Surgeon: Cassius Man MD;  [facility];  Service: Neurosurgery    MS [place] TERMINUS Right 05/31/2017    Procedure: IMAGE GUIDED CORONAL PLACEMENT OF PROGRAMABLE VENTRICULAR-PERITONEAL SHUNT, REMOVAL OF LP SHUNT ;  Surgeon: Cassius Man MD;  [facility];  Service: Neurosurgery    MS CYSTO BLADDER W/URETERAL CATHETERIZATION N/A 06/06/2020    Procedure: Bilateral CYSTOSCOPY RETROGRADE PYELOGRAM WITH INSERTION STENT URETERAL;  Surgeon: Bethany Engle MD;  Location[facility];  Service: Urology    MS CYSTO/URETERO W/LITHOTRIPSY &INDWELL STENT INSRT Bilateral 06/24/2020    Procedure: CYSTOSCOPY URETEROSCOPY WITH LITHOTRIPSY HOLMIUM LASER, RETROGRADE PYELOGRAM AND exchange bilateral  STENTs URETERAL;  Surgeon: Kacey Ferrell MD;  [facility];  Service: Urology    MS CYSTO/URETERO W/LITHOTRIPSY &INDWELL STENT INSRT Left 08/21/2021    Procedure: CYSTOSCOPY; LEFT URETEROSCOPY WITH RETROGRADE PYELOGRAM, REMOVAL OF STONE AND INSERTION LEFT URETERAL STENT;  Surgeon: Roseann Hong MD;  [facility];  Service: Urology    MS EGD TRANSORAL BIOPSY SINGLE/MULTIPLE N/A 06/27/2018    Procedure: ESOPHAGOGASTRODUODENOSCOPY (EGD) with padlock clip placement;  Surgeon: Charls Babinski, MD;  [facility];   Service: Bariatrics    MS RMVL COMPL CSF SHUNT SYSTEM W/O RPLCMT SHUNT Right 02/05/2018    Procedure: Removal of  shunt;  Surgeon: Cassius Man MD;  [facility];  Service: Neurosurgery    MS RPLCMT/REVJ CSF SHUNT VALVE/CATH SHUNT SYS Right 01/25/2018    Procedure: Externalization of right-sided SHUNT VENTRICULAR-PERITONEAL in anterior chest wall ribs two and three level  ;  Surgeon: Maegan Bennett MD;  [facility];  Service: Neurosurgery    REDUCTION MAMMAPLASTY Bilateral  WRIST SURGERY Right     x3 2006, 2008       Meds/Allergies:  Prior to Admission medications    Medication Sig Start Date End Date Taking? Authorizing Provider   acetaZOLAMIDE (DIAMOX) 250 mg tablet Take 1 tablet (250 mg total) by mouth 3 (three) times a day 3/31/23   Emily Silva PA-C   ascorbic acid (VITAMIN C) 250 MG CHEW Chew 250 mg daily    Historical Provider, MD   Biotin 1 MG CAPS Take 1 mg by mouth daily      Historical Provider, MD   calcium citrate-vitamin D (CITRACAL+D) 315-200 MG-UNIT per tablet Take 1 tablet by mouth 2 (two) times a day    Historical Provider, MD   dexamethasone (DECADRON) 2 mg tablet Take 1 tablet (2 mg total) by mouth daily with breakfast 3/31/23   Emily Silva PA-C   famotidine (PEPCID) 40 MG tablet take 1 tablet by mouth once daily 2/18/23   Mirta Chopra MD   ferrous gluconate 324 (37 5 Fe) mg Take 324 mg by mouth 2 (two) times a day before meals    Historical Provider, MD   Ferrous Sulfate (IRON PO) Take 2 tablets by mouth in the morning Chewable    Historical Provider, MD   folic acid (FOLVITE) 1 mg tablet Take 2 tablets (2 mg total) by mouth daily 2/9/23   ABNER Lucero   Multiple Vitamin (MULTIVITAMIN) tablet Take 1 tablet by mouth daily Wears a patch    Historical Provider, MD   Multiple Vitamins-Minerals (Hair/Skin/Nails) CAPS Take 1 tablet by mouth 2 (two) times a day      Historical Provider, MD   omeprazole (PriLOSEC) 40 MG capsule take 1 capsule by mouth twice a day 4/2/23   Estefanía Lopez MD   vitamin B-12 (VITAMIN B-12) 500 mcg tablet Take 500 mcg by mouth daily    Historical Provider, MD     I have reviewed home medications with patient personally  Allergies:    Allergies   Allergen Reactions    Benadryl [Diphenhydramine] Anaphylaxis     Throat closing    Phenergan [Promethazine] Anaphylaxis       Social History:  Marital Status: Single   Occupation:   Patient Pre-hospital Living Situation: Home  Patient Pre-hospital Level of Mobility: "samina  Patient Pre-hospital Diet Restrictions: bariatric surgery diet  Substance Use History:   Social History     Substance and Sexual Activity   Alcohol Use Never     Social History     Tobacco Use   Smoking Status Former    Packs/day: 0 50    Years: 20 00    Pack years: 10 00    Types: Cigarettes    Quit date: 8/24/2012    Years since quitting: 10 6   Smokeless Tobacco Never     Social History     Substance and Sexual Activity   Drug Use Yes    Frequency: 7 0 times per week    Types: Marijuana    Comment: medical marijuana, vaping & topical        Family History:  Family History   Problem Relation Age of Onset    Kidney cancer Mother 77    No Known Problems Father     No Known Problems Sister     No Known Problems Daughter     No Known Problems Maternal Grandmother     Colon cancer Maternal Grandfather 44    No Known Problems Paternal Grandmother     Cancer Paternal Grandfather     Thyroid cancer Brother 36    No Known Problems Maternal Aunt     No Known Problems Maternal Aunt     No Known Problems Paternal Aunt     Cancer Family         Gastric    Leukemia Family     Colon cancer Family     Pancreatic cancer Family     Lung cancer Maternal Uncle 64       Physical Exam:     Vitals:   Blood Pressure: 137/73 (05/03/23 2053)  Pulse: 67 (05/03/23 2053)  Temperature: 98 2 °F (36 8 °C) (05/03/23 1720)  Temp Source: Tympanic (05/03/23 1720)  Respirations: 18 (05/03/23 2053)  Height: 5' 3\" (160 cm) (05/03/23 1720)  Weight - Scale: 63 5 kg (140 lb) (05/03/23 1720)  SpO2: 96 % (05/03/23 2053)    Physical Exam  Vitals reviewed  Constitutional:       General: She is in acute distress (holding left flank from pain)  Appearance: Normal appearance  HENT:      Head: Normocephalic and atraumatic  Nose: Nose normal    Eyes:      General:         Right eye: No discharge  Left eye: No discharge  Extraocular Movements: Extraocular movements intact        Conjunctiva/sclera: " Conjunctivae normal    Cardiovascular:      Rate and Rhythm: Normal rate and regular rhythm  Pulmonary:      Effort: Pulmonary effort is normal  No respiratory distress  Breath sounds: Normal breath sounds  No wheezing  Abdominal:      General: Bowel sounds are normal  There is no distension  Palpations: Abdomen is soft  Tenderness: There is abdominal tenderness  There is no guarding  Musculoskeletal:         General: No swelling or tenderness  Normal range of motion  Cervical back: Normal range of motion  Right lower leg: No edema  Left lower leg: No edema  Skin:     General: Skin is warm and dry  Capillary Refill: Capillary refill takes less than 2 seconds  Neurological:      General: No focal deficit present  Mental Status: She is alert and oriented to person, place, and time  Mental status is at baseline  Psychiatric:         Mood and Affect: Mood normal          Behavior: Behavior normal          Thought Content:  Thought content normal          Judgment: Judgment normal             Additional Data:     Lab Results:  Results from last 7 days   Lab Units 05/03/23  1739   WBC Thousand/uL 6 70   HEMOGLOBIN g/dL 16 9*   HEMATOCRIT % 49 9*   PLATELETS Thousands/uL 208   NEUTROS PCT % 62   LYMPHS PCT % 26   MONOS PCT % 6   EOS PCT % 5     Results from last 7 days   Lab Units 05/03/23  1739   SODIUM mmol/L 137   POTASSIUM mmol/L 4 6   CHLORIDE mmol/L 103   CO2 mmol/L 26   BUN mg/dL 14   CREATININE mg/dL 0 72   ANION GAP mmol/L 8   CALCIUM mg/dL 9 5   GLUCOSE RANDOM mg/dL 110         Lines/Drains:  Invasive Devices     Peripheral Intravenous Line  Duration           Peripheral IV 05/03/23 Right Antecubital <1 day              Imaging: Reviewed radiology reports from this admission including: CT renal stone study w/ a/p w/o contrast  CT renal stone study abdomen pelvis wo contrast   Final Result by Sissy Bush DO (05/03 1918)   There is an obstructing 7 mm stone in the left UPJ  Urology consultation advised  Bilateral nonobstructing renal collecting system calculi  In addition there is a nonobstructing right ureter stone measuring 3 x 4 mm which is apparently asymptomatic  The study was marked in [facility] for immediate notification  Workstation performed: [id number]         XR abdomen 1 view kub    (Results Pending)       EKG and Other Studies Reviewed on Admission:   · EKG: No EKG obtained  ** Please Note: This note has been constructed using a voice recognition system   **

## 2023-05-04 NOTE — CASE MANAGEMENT
Case Management Assessment & Discharge Planning Note    Patient name Aristeo Snowden  Location /-17 MRN [id number]  : 1975 Date 2023       Current Admission Date: 5/3/2023  Current Admission Diagnosis:Obstruction of left ureteropelvic junction (UPJ) due to stone   Patient Active Problem List    Diagnosis Date Noted    Hypokalemia 2023    Obstruction of left ureteropelvic junction (UPJ) due to stone 2023    Chronic idiopathic constipation 2022    Macromastia 2022    S/P bilateral breast reduction 2022    Annual physical exam 2021    Iron deficiency anemia secondary to inadequate dietary iron intake 2021    Colon cancer screening 10/21/2021    Migraine with aura and without status migrainosus, not intractable 2021    Encounter for surgical aftercare following surgery of digestive system 2020    Papilledema 12/10/2019    Postgastrectomy malabsorption 2019    History of idiopathic intracranial hypertension 2019    Chronic tension-type headache, intractable 2019    PTSD (post-traumatic stress disorder) 01/10/2019    Renal calculi 10/04/2018    Refusal of influenza vaccine by provider 2018    Bariatric surgery status 2018    Moderate protein-calorie malnutrition ([facility] [address] ) 2018    Choroidal nevus, right eye 2018    Refusal of blood product 2018    Murmur, cardiac 2018    S/P  shunt 2018    Gastroesophageal reflux disease 12/15/2017    Hypercholesteremia 2017    Pseudotumor cerebri 2017    Mixed incontinence 2017    Subacromial bursitis of right shoulder joint 2015    Family history of malignant neoplasm of gastrointestinal tract 2013      LOS (days): 0  Geometric Mean LOS (GMLOS) (days):   Days to GMLOS:     OBJECTIVE:              Current admission status: Observation  Referral Reason: Other (Discharge planning)    Preferred Pharmacy:   [id number] - [place], [address]  [address] [facility] [address]  Phone: [phone] Fax: [phone]    Primary Care Provider: Colleen Marr DO    Primary Insurance: Fan BARNETT  Secondary Insurance:     ASSESSMENT:  Lee [address] Proxies    There are no active Health Care Proxies on file  Advance Directives  Does patient have a [address] [place] [address] Avenue?: No  Was patient offered paperwork?: Yes  Does patient currently have a Health Care decision maker?: No  Does patient have Advance Directives?: No  Was patient offered paperwork?: Yes  Primary Contact: David MOJICA         Readmission Root Cause  30 Day Readmission: No    Patient Information  Admitted from[de-identified] Home  Mental Status: Alert  During Assessment patient was accompanied by: Not accompanied during assessment  Assessment information provided by[de-identified] Patient  Primary Caregiver: Self  Support Systems: Spouse/significant other, [address]orie [address] of Residence: [facility] do you live in?: [address] entry access options  Select all that apply : Stairs  Number of steps to enter home  : 1  Do the steps have railings?: Yes  Type of Current Residence: 2 story home  In the last 12 months, was there a time when you were not able to pay the mortgage or rent on time?: No  In the last 12 months, how many places have you lived?: 1  In the last 12 months, was there a time when you did not have a steady place to sleep or slept in a shelter (including now)?: No  Homeless/housing insecurity resource given?: N/A  Living Arrangements: Lives w/ Spouse/significant other  Is patient a ?: No    Activities of Daily Living Prior to Admission  Functional Status: Independent  Completes ADLs independently?: Yes  Ambulates independently?: Yes  Does patient use assisted devices?: No  Does patient currently own DME?: No  Does patient have a history of Outpatient Therapy (PT/OT)?: No  Does the patient have a history of Short-Term Rehab?: No  Does patient have a history of HHC?: No  Does patient currently have [facility]?: No         Patient Information Continued  Income Source: Unemployed  Does patient have prescription coverage?: Yes  Within the past 12 months, you worried that your food would run out before you got the money to buy more : Never true  Within the past 12 months, the food you bought just didn't last and you didn't have money to get more : Never true  Food insecurity resource given?: N/A  Does patient receive dialysis treatments?: No  Does patient have a history of substance abuse?: No  Does patient have a history of Mental Health Diagnosis?: No         Means of Transportation  Means of Transport to Appts[de-identified] Drives Self  In the past 12 months, has lack of transportation kept you from medical appointments or from getting medications?: No  In the past 12 months, has lack of transportation kept you from meetings, work, or from getting things needed for daily living?: No  Was application for public transport provided?: N/A        DISCHARGE DETAILS:    Discharge planning discussed with[de-identified] Patient  Freedom of Choice: Yes        [address]         Is the patient interested in [facility] at discharge?: No    DME Referral Provided  Referral made for DME?: No         Would you like to participate in our [address] service program?  : No - Declined    Treatment Team Recommendation: Home  Discharge Destination Plan[de-identified] Home  Transport at Discharge : Family (SO)         Additional Comments: Pt completely independent with ADLs  Pt will return to home with SO on discharge  No CM discharge needs identified at this itme  CM to follow

## 2023-05-04 NOTE — CONSULTS
Consultation - Urology   Heydi Orellana 49 y o  female MRN: [id number]  Unit/Bed#: OR POOL Encounter: [id number]      Assessment/Plan      Assessment:  Left renal colic secondary to 7 mm proximal ureteral stone  Incidentally discovered 6 mm right proximal ureteral stone with no significant hydronephrosis  Normal renal function  Plan:  Options, procedures, benefits and risks were discussed and she agreed to undergo the planned procedure which will include cystoscopy with bilateral retrograde urogram's and insertion of bilateral ureteral stents  She understands that the stones will not be removed at this time and will require additional procedures in the future  She is a longtime patient of [facility] for urology and will follow up with them as an outpatient for definitive stone procedure  History of Present Illness   Attending: Shiloh Cole MD  Reason for Consult / Principal Problem: Left renal colic  HPI: Heydi Orellana is a 49y o  year old female who presents with acute onset of severe left flank pain with CT scan showing significant left hydronephrosis secondary to a 7 mm stone at the L2-3 vertebral level just at or below the UPJ  Incidentally was seen in a 6 mm stone at the right L3-4 vertebral level with no significant hydronephrosis  She denies any right-sided pain  She has a long history of kidney stones requiring multiple procedures over the years  She is most recently followed by [facility] for urology  She denies any fever or chills, but has had nausea and vomiting  Her pain is not well controlled with pain medications  KUB this morning shows a 7 mm calcification in the left ureter corresponding to the CT scan from yesterday as well as a possible 4 mm calcification in the right ureter corresponding to the CT scan from yesterday  Inpatient consult to Urology  Consult performed by: Mitch Amado MD  Consult ordered by:  Lang Albrecht PA-C          Review of Systems   Constitutional: Negative for chills and fever  Genitourinary: Positive for flank pain  Negative for difficulty urinating, dysuria and hematuria  Historical Information   Past Medical History:   Diagnosis Date    Abdominal pain     Acute cystitis with hematuria 1/3/2020    Brain condition     Pseudotumor Cerebri     Chronic kidney disease     COVID-19 virus infection 11/10/2022    De Quervain's disease (tenosynovitis)     Esophageal perforation     Gastric leak     GERD (gastroesophageal reflux disease)     Headache     Idiopathic intracranial hypertension     Kidney stone     Migraine     No blood products     per pt: personal and Holiness beliefs  surgeon office aware 12/13/19    Papilledema, both eyes     PONV (postoperative nausea and vomiting)     Postgastrectomy malabsorption     Presence of lumboperitoneal shunt     Resolved: Sep 20, 2017    Rotator cuff tendinitis     Resolved: Aug 23, 2017    Visual field defect      Past Surgical History:   Procedure Laterality Date    BREAST BIOPSY Left 1993    benign    CSF SHUNT      LP shunt - 2015 -  shunt - 2017    ESOPHAGOGASTRODUODENOSCOPY N/A 02/23/2018    Procedure: ESOPHAGOGASTRODUODENOSCOPY (EGD) WITH ESOPHAGEAL STENT PLACEMENT;  Surgeon: Jenny Chaudhry MD;  [facility];  Service: Thoracic    ESOPHAGOGASTRODUODENOSCOPY N/A 02/23/2018    Procedure: ESOPHAGOGASTRODUODENOSCOPY (EGD) WITH REMOVAL ESOPHAGEAL STENT  AND REPLACEMENT WITH 23mm X155mm [address];  Surgeon: Jenny Chaudhry MD;  [facility];  Service: Thoracic    ESOPHAGOGASTRODUODENOSCOPY N/A 03/28/2018    Procedure: ESOPHAGOGASTRODUODENOSCOPY (EGD) with PEJ placement ;  Surgeon: Melani Goodwin MD;  [facility];   Service: Gastroenterology    ESOPHAGOGASTRODUODENOSCOPY N/A 04/05/2018    Procedure: ESOPHAGOGASTRODUODENOSCOPY (EGD) with botox injection and kaofed placement;  Surgeon: Torito Edwards DO;  [facility]: "[facility]; Service: Gastroenterology    ESOPHAGOGASTRODUODENOSCOPY N/A 04/10/2018    Procedure: ESOPHAGOGASTRODUODENOSCOPY (EGD) with Kaofed placement;  Surgeon: Jackie Manning MD;  [facility]; Service: Gastroenterology    ESOPHAGOSCOPY WITH STENT INSERTION N/A 01/24/2018    Procedure: INSERTION STENT ESOPHAGEAL;  Surgeon: Ibeth Merida MD;  [facility]; Service: Gastroenterology    EYE SURGERY Bilateral 1980    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)     FL RETROGRADE PYELOGRAM  06/24/2020    FL RETROGRADE PYELOGRAM  08/21/2021    GASTRIC BYPASS  12/19/2016    Lap sleeve gastrectomy w/shunt length shortening procedure    HIATAL HERNIA REPAIR      HYSTERECTOMY  03/14/2013    IR LUMBAR PUNCTURE  08/29/2018    LAPAROTOMY N/A 01/25/2018    Procedure: Exploratory Laparotomy, wash out,placement of drains, placement of NG feeding tube ;   Surgeon: Sonido Hernandez DO;  [facility];  Service: General    LUMBAR PERITONEAL SHUNT  11/19/2015    Laparoscopic assisted    OK BREAST REDUCTION Bilateral 03/25/2022    Procedure: BILATERAL BREAST REDUCTION;  Surgeon: Rodrigo Thakur MD;  [facility];  Service: Plastics    OK CRTJ SHUNT [id number] Right 12/18/2019    Procedure: IMAGE GUIDED INSERTION OF RIGHT CORONAL VENTRICULAR-ATRIAL SHUNT;  Surgeon: Mich Toledo MD;  [facility];  Service: Neurosurgery    OK [place] TERMINUS Right 05/31/2017    Procedure: IMAGE GUIDED CORONAL PLACEMENT OF PROGRAMABLE VENTRICULAR-PERITONEAL SHUNT, REMOVAL OF LP SHUNT ;  Surgeon: Mich Toledo MD;  [facility];  Service: Neurosurgery    OK CYSTO BLADDER W/URETERAL CATHETERIZATION N/A 06/06/2020    Procedure: Bilateral CYSTOSCOPY RETROGRADE PYELOGRAM WITH INSERTION STENT URETERAL;  Surgeon: Estella Viramontes MD;  [facility];  Service: Urology    OK CYSTO/URETERO W/LITHOTRIPSY &INDWELL STENT INSRT Bilateral " 06/24/2020    Procedure: CYSTOSCOPY URETEROSCOPY WITH LITHOTRIPSY HOLMIUM LASER, RETROGRADE PYELOGRAM AND exchange bilateral  STENTs URETERAL;  Surgeon: Kacey Ferrell MD;  [facility];  Service: Urology    AL CYSTO/URETERO W/LITHOTRIPSY &INDWELL STENT INSRT Left 08/21/2021    Procedure: CYSTOSCOPY; LEFT URETEROSCOPY WITH RETROGRADE PYELOGRAM, REMOVAL OF STONE AND INSERTION LEFT URETERAL STENT;  Surgeon: Roseann Hong MD;  [facility];  Service: Urology    AL EGD TRANSORAL BIOPSY SINGLE/MULTIPLE N/A 06/27/2018    Procedure: ESOPHAGOGASTRODUODENOSCOPY (EGD) with padlock clip placement;  Surgeon: Charls Babinski, MD;  [facility];   Service: Bariatrics    AL RMVL COMPL CSF SHUNT SYSTEM W/O RPLCMT SHUNT Right 02/05/2018    Procedure: Removal of  shunt;  Surgeon: Cassius Man MD;  [facility];  Service: Neurosurgery    AL RPLCMT/REVJ CSF SHUNT VALVE/CATH SHUNT SYS Right 01/25/2018    Procedure: Externalization of right-sided SHUNT VENTRICULAR-PERITONEAL in anterior chest wall ribs two and three level  ;  Surgeon: Maegan Bennett MD;  [facility];  Service: Neurosurgery    REDUCTION MAMMAPLASTY Bilateral     WRIST SURGERY Right     x3 2006, 2008     Social History   Social History     Substance and Sexual Activity   Alcohol Use Never     @DRUGHX  E-Cigarette/Vaping    E-Cigarette Use Current Every Day User     Comments medical marijuana      E-Cigarette/Vaping Substances    Nicotine No     THC Yes     CBD Yes     Flavoring No     Other Yes lotion    Unknown No      Social History     Tobacco Use   Smoking Status Former    Packs/day: 0 50    Years: 20 00    Pack years: 10 00    Types: Cigarettes    Quit date: 8/24/2012    Years since quitting: 10 6   Smokeless Tobacco Never     Family History: non-contributory    Meds/Allergies   current meds:   Current Facility-Administered Medications   Medication Dose Route Frequency    fentanyl citrate (PF) "100 MCG/2ML **ADS Override Pull**        acetaminophen (TYLENOL) tablet 650 mg  650 mg Oral Q4H PRN    cefTRIAXone (ROCEPHIN) IVPB (premix in dextrose) 1,000 mg 50 mL  1,000 mg Intravenous Q24H    diazepam (VALIUM) injection 5 mg  5 mg Intravenous Q8H PRN    Famotidine (PF) (PEPCID) injection 20 mg  20 mg Intravenous HS    fentaNYL (SUBLIMAZE) injection 25 mcg  25 mcg Intravenous Q5 Min PRN    fentaNYL (SUBLIMAZE) injection 50 mcg  50 mcg Intravenous Q3 min PRN    HYDROmorphone (DILAUDID) injection 0 5 mg  0 5 mg Intravenous Q5 Min PRN    HYDROmorphone (DILAUDID) injection 1 mg  1 mg Intravenous Q3H PRN    lactated ringers infusion  100 mL/hr Intravenous Continuous    metoclopramide (REGLAN) injection 10 mg  10 mg Intravenous Once PRN    ondansetron (ZOFRAN) injection 4 mg  4 mg Intravenous Q6H PRN    ondansetron (ZOFRAN) injection 4 mg  4 mg Intravenous Once PRN    oxyCODONE (ROXICODONE) IR tablet 5 mg  5 mg Oral Q4H PRN    pantoprazole (PROTONIX) injection 40 mg  40 mg Intravenous Q24H MYRNA    tamsulosin (FLOMAX) capsule 0 4 mg  0 4 mg Oral Daily With Dinner     Allergies   Allergen Reactions    Benadryl [Diphenhydramine] Anaphylaxis     Throat closing    Phenergan [Promethazine] Anaphylaxis       Objective   Vitals: Blood pressure (!) 84/46, pulse 93, temperature 99 8 °F (37 7 °C), resp  rate 20, height 5' 3\" (1 6 m), weight 63 5 kg (139 lb 15 9 oz), SpO2 100 %, not currently breastfeeding  I/O last 24 hours: In: 2500 [I V :1500; IV Piggyback:1000]  Out: 625 [Urine:625]    Invasive Devices     Peripheral Intravenous Line  Duration           Peripheral IV 05/03/23 Right Antecubital 1 day          Drain  Duration           Ureteral Internal Stent Left ureter 6 Fr  <1 day    Ureteral Internal Stent Right ureter 6 Fr  <1 day                Physical Exam  Abdominal:      General: There is no distension  Palpations: Abdomen is soft  Tenderness: There is no abdominal tenderness   There is " left CVA tenderness  There is no right CVA tenderness  Lab Results:   CBC:   Lab Results   Component Value Date    WBC 5 28 05/04/2023    HGB 14 7 05/04/2023    HCT 44 7 05/04/2023    MCV 91 05/04/2023     (L) 05/04/2023    MCH 30 0 05/04/2023    MCHC 32 9 05/04/2023    RDW 13 1 05/04/2023    MPV 9 6 05/04/2023     CMP:   Lab Results   Component Value Date    SODIUM 138 05/04/2023     05/04/2023    CO2 24 05/04/2023    BUN 15 05/04/2023    CREATININE 0 97 05/04/2023    CALCIUM 8 3 (L) 05/04/2023    EGFR 69 05/04/2023     Urinalysis:   Lab Results   Component Value Date    COLORU Yellow 05/03/2023    CLARITYU Slightly Cloudy (A) 05/03/2023    SPECGRAV 1 020 05/03/2023    PHUR 7 0 05/03/2023    LEUKOCYTESUR 1+ (A) 05/03/2023    NITRITE Negative 05/03/2023    GLUCOSEU Negative 05/03/2023    KETONESU 40 (2+) (A) 05/03/2023    BILIRUBINUR Negative 05/03/2023    BLOODU 2+ (A) 05/03/2023     Urine Culture: No results found for: URINECX  Imaging Studies: I have personally reviewed pertinent reports  EKG, Pathology, and Other Studies: I have personally reviewed pertinent reports  VTE Prophylaxis: Sequential compression device (Venodyne)     Code Status: Level 1 - Full Code  Advance Directive and Living Will:      Power of :    POLST:      Counseling / Coordination of Care  Total floor / unit time spent today 30 minutes  Greater than 50% of total time was spent with the patient and / or family counseling and / or coordination of care   A description of the counseling / coordination of care: I discussed the case with the hospitalist

## 2023-05-04 NOTE — ANESTHESIA PREPROCEDURE EVALUATION
Anesthesia Pre Eval Note    Anesthesia ROS/Med Hx        Anesthetic Complication History:    History of postoperative nausea & vomiting    Pulmonary Review:  Patient does not have a pulmonary history      Neuro/Psych Review:  Patient does not have a neuro/psych history       Cardiovascular Review:  Patient does not have a cardiovascular history       End/Other Review:    Positive for obesity class II - 35.00 - 39.99  Additional Results:     ALLERGIES:   -- Morphine -- HIVES and Nausea & Vomiting       Lab Results       Component                Value               Date                       WBC                      4.2                 01/17/2022                 WBC                      6.6                 02/15/2017                 RBC                      5.08                01/17/2022                 RBC                      5.09                02/15/2017                 HGB                      14.7                01/17/2022                 HGB                      13.6                02/15/2017                 HCT                      44.4                01/17/2022                 MCHC                     33.1                01/17/2022                 MCHC                     32.5                02/15/2017                 SODIUM                   137                 01/17/2022                 SODIUM                   141                 02/15/2017                 POTASSIUM                4.1                 01/17/2022                 POTASSIUM                4.4                 02/15/2017                 CHLORIDE                 105                 01/17/2022                 CO2                      27                  01/17/2022                 CO2                      27                  02/15/2017                 GLUCOSE                  89                  01/17/2022                 GLUCOSE                  117 (H)             02/15/2017                 BUN                      11                  01/17/2022   Procedure:  CYSTOSCOPY RETROGRADE PYELOGRAM WITH INSERTION STENT URETERAL (Bilateral: Bladder)    Relevant Problems   CARDIO   (+) Hypercholesteremia   (+) Migraine with aura and without status migrainosus, not intractable   (+) Murmur, cardiac      GI/HEPATIC   (+) Bariatric surgery status   (+) Gastroesophageal reflux disease      /RENAL   (+) Obstruction of left ureteropelvic junction (UPJ) due to stone   (+) Renal calculi      HEMATOLOGY   (+) Iron deficiency anemia secondary to inadequate dietary iron intake      NEURO/PSYCH   (+) Chronic tension-type headache, intractable   (+) History of idiopathic intracranial hypertension   (+) Migraine with aura and without status migrainosus, not intractable   (+) PTSD (post-traumatic stress disorder)        Physical Exam    Airway    Mallampati score: II  TM Distance: >3 FB  Neck ROM: full     Dental   No notable dental hx     Cardiovascular      Pulmonary      Other Findings        Anesthesia Plan  ASA Score- 3     Anesthesia Type- general with ASA Monitors  Additional Monitors:   Airway Plan: ETT  Plan Factors-Exercise tolerance (METS): >4 METS  Chart reviewed  EKG reviewed  Existing labs reviewed  Patient summary reviewed  Patient is not a current smoker  There is medical exclusion for perioperative obstructive sleep apnea risk education  Induction- intravenous and rapid sequence induction  Postoperative Plan- Plan for postoperative opioid use  Informed Consent- Anesthetic plan and risks discussed with patient  I personally reviewed this patient with the CRNA  Discussed and agreed on the Anesthesia Plan with the CRNA  Bouchra Aguilar                BUN                      10                  02/15/2017                 CREATININE               0.65                2022                 CREATININE               0.79                02/15/2017                 GFRESTIMATE              >90                 2022                 GFRA                     >90                 02/15/2017                 GFRNA                    >90                 02/15/2017                 CALCIUM                  9.4                 2022                 PLT                      242                 2022                 PLT                      211                 02/15/2017             Past Medical History:  2018: Allergic reaction      Comment:  testing revealed dust mites, and nickel  07/15/2020: COVID-19 virus infection      Comment:  also had positive HOME TEST on 2022  No date: Motion sickness  No date: PONV (postoperative nausea and vomiting)  2018: Right elbow tendinitis  2022: Uterine leiomyoma    Past Surgical History:  10/21/2010:  postpartum care increased service      Comment:  and 10/23/2012  1998: D and c      Comment:  and 2021  10/2012: Tubal ligation      Comment:  with 2nd CS       Prior to Admission medications :  Medication cetirizine (ZyrTEC) 10 MG tablet, Sig Take 10 mg by mouth daily., Start Date , End Date , Taking? Yes, Authorizing Provider Outside Provider    Medication vitamin D3 (CHOLECALCIFEROL) 1.25 mg (50,000 units) capsule, Sig Vitamin D3, Start Date , End Date , Taking? , Authorizing Provider Outside Provider         Patient Vitals in the past 24 hrs:  22 0626, BP:(!) 143/90, Temp:36.5 °C (97.7 °F), Temp src:Temporal, Pulse:(!) 106, Resp:16, SpO2:95 %, Height:5' 2\" (1.575 m), Weight:98.7 kg (217 lb 9.5 oz)      Relevant Problems   No relevant active problems       Physical Exam     Airway   Mallampati: III  TM Distance: >3 FB  Neck ROM: Full  Neck: Non-tender, Able to place in sniff  position and Thick  TMJ Mobility: Good    Cardiovascular  Cardiovascular exam normal  Cardio Rhythm: Regular  Cardio Rate: Normal    Head Assessment  Head assessment: Normocephalic and Atraumatic    General Assessment  General Assessment: Alert and oriented and No acute distress    Dental Exam  Dental exam normal    Pulmonary Exam  Pulmonary exam normal  Breath sounds clear to auscultation:  Yes    Abdominal Exam    Patient Demonstrates:  Obese      Anesthesia Plan:    ASA Status: 2  Anesthesia Type: General    Induction: Intravenous  Preferred Airway Type: ETT  Maintenance: Inhalational  Premedication: IV      Post-op Pain Management: Per Surgeon      Checklist  Reviewed: NPO Status, Allergies, Medications, Problem list, Past Med History, Lab Results, Pregnancy Test Results, Care Everywhere, Nursing Notes, Outside Records, Patient Summary and Consultations  Consent/Risks Discussed Statement:  The proposed anesthetic plan, including its risks and benefits, have been discussed with the Patient along with the risks and benefits of alternatives. Questions were encouraged and answered and the patient and/or representative understands and agrees to proceed.    I have discussed elements of the patient's history or examination, as noted above and/or as follows, that place the patient at higher risk of complications; BMI> 30 (obesity).    I discussed with the patient (and/or patient's legal representative) the risks and benefits of the proposed anesthesia plan, General, which may include services performed by other anesthesia providers.    Alternative anesthesia plans, if available, were reviewed with the patient (and/or patient's legal representative). Discussion has been held with the patient (and/or patient's legal representative) regarding risks of anesthesia, which include Nausea, Vomiting, Dental Injury, allergic reaction, anxiety, dental injury, emergence delirium, eye injury, headache, hypotension, memory loss,  nausea, need for blood transfusion, nerve injury, oral injury, persistent pain, sore throat and vomiting and emergent situations that may require change in anesthesia plan.    The patient (and/or patient's legal representative) has indicated understanding, his/her questions have been answered, and he/she wishes to proceed with the planned anesthetic.    Blood Products: Not Anticipated    Comments  Plan Comments: Patient denies damaged and/or loose teeth.    Patient agreed to proceed with procedure with the knowledge that dental injury was a possibility.

## 2023-05-04 NOTE — UTILIZATION REVIEW
Initial Clinical Review    Admission: Date/Time/Statement:   Admission Orders (From admission, onward)     Ordered        05/03/23 1958  Place in Observation  Once                      Orders Placed This Encounter   Procedures    Place in Observation     Standing Status:   Standing     Number of Occurrences:   1     Order Specific Question:   Level of Care     Answer:   Med Surg [16]     ED Arrival Information     Expected   -    Arrival   5/3/2023 17:04    Acuity   Urgent            Means of arrival   Walk-In    Escorted by   Family Member    Service   Hospitalist    Admission type   Emergency            Arrival complaint   vomitting abdominal pain           Chief Complaint   Patient presents with    Flank Pain     Left flank pain into abd for past 40 minutes  Hx of kidney stones  Vomiting at present  Initial Presentation: 49 y o  female , presented to the ED @ [facility], from home via walk in  Admitted as Observation due to Obstruction of left ureteropelvic junction (UPJ) due to Stone  PMH of pseudotumor cerebri, history of gastric bypass and padlock clip placement on her stomach, prior admissions for nephrolithiasis  Date: 05/03/2023   Presents with left flank pain  Patient was on Diamox for 5 day  Patient reports pain started around 4:45  She ate dinner and vomited  She has severe left flank pain 10/10 with minimal relief with the meds  She reports last time to urinate was around 5pm   Patient also notes pain in her bilateral lower abdomen  ED treatment including Dilaudid 0 5 mg x 3, Toradol 15 mg x 1 Zofran 4 mg x 1 1 L IV fluids, Flomax 0 4 mg   CT: There is an obstructing 7 mm stone in the left UPJ  Consult Urology  IV flds  IV Abx  Analgesia control PRN  Flomax  Strain all Urine  Continue PPI & Pepcid  Day 2: 05/04/2023   Continue IV flds  IV Abx  Analgesia control PRN  Flomax  IV protonix  Strain all urine        05/04/2023  Consult Uro:  Left renal colic secondary to 7 mm proximal ureteral stone  Incidentally discovered 6 mm right proximal ureteral stone with no significant hydronephrosis  Normal renal function  Plan:  Options, procedures, benefits and risks were discussed and she agreed to undergo the planned procedure which will include cystoscopy with bilateral retrograde urogram's and insertion of bilateral ureteral stents  She understands that the stones will not be removed at this time and will require additional procedures in the future  She is a longtime patient of [address] [place] [address] for urology and will follow up with them as an outpatient for definitive stone procedure  Surgery Date:  05/04/2023  Procedure:  Bilateral - CYSTOSCOPY RETROGRADE PYELOGRAM WITH INSERTION STENT URETERAL  Anesthesia Type: General  Operative Findings:  Bilateral ureteral stones seen that correspond to prior KUB and CT scan  There is significant hydroureteronephrosis above the left ureteral stone and mild hydroureteronephrosis above the right ureteral stone      ED Triage Vitals   Temperature Pulse Respirations Blood Pressure SpO2   05/03/23 1720 05/03/23 1720 05/03/23 1720 05/03/23 1720 05/03/23 1720   98 2 °F (36 8 °C) 62 20 147/75 94 %      Temp Source Heart Rate Source Patient Position - Orthostatic VS BP Location FiO2 (%)   05/03/23 1720 05/03/23 2053 05/03/23 1720 05/03/23 1720 --   Tympanic Monitor Sitting Left arm       Pain Score       05/03/23 1720       10 - Worst Possible Pain          Wt Readings from Last 1 Encounters:   05/03/23 63 5 kg (139 lb 15 9 oz)     Additional Vital Signs:   Date/Time Temp Pulse Resp BP MAP (mmHg) SpO2 O2 Device Patient Position - Orthostatic VS   05/04/23 08:07:42 -- 92 -- 95/55 68 94 % -- --   05/04/23 07:43:45 99 °F (37 2 °C) 94 17 88/54 Abnormal  65 96 % None (Room air) Lying   05/03/23 23:50:34 98 6 °F (37 °C) 103 20 140/99 113 94 % -- --   05/03/23 23:49:16 98 6 °F (37 °C) 97 20 140/99 113 94 % -- --   05/03/23 2053 -- 67 18 137/73 -- 96 % -- Sitting         Pertinent Labs/Diagnostic Test Results:   CT renal stone study abdomen pelvis wo contrast   Final Result by Lance Holguin DO (05/03 1918)   There is an obstructing 7 mm stone in the left UPJ  Urology consultation advised  Bilateral nonobstructing renal collecting system calculi  In addition there is a nonobstructing right ureter stone measuring 3 x 4 mm which is apparently asymptomatic        XR abdomen 1 view kub    (Results Pending)     Results from last 7 days   Lab Units 05/04/23  0520 05/03/23  1739   WBC Thousand/uL 5 28 6 70   HEMOGLOBIN g/dL 14 7 16 9*   HEMATOCRIT % 44 7 49 9*   PLATELETS Thousands/uL 120* 208   NEUTROS ABS Thousands/µL  --  4 15     Results from last 7 days   Lab Units 05/04/23  0520 05/04/23  0144 05/03/23  1739   SODIUM mmol/L 138 138 137   POTASSIUM mmol/L 3 3* 3 9 4 6   CHLORIDE mmol/L 106 106 103   CO2 mmol/L 24 25 26   ANION GAP mmol/L 8 7 8   BUN mg/dL 15 15 14   CREATININE mg/dL 0 97 0 78 0 72   EGFR ml/min/1 73sq m 69 90 99   CALCIUM mg/dL 8 3* 8 8 9 5   MAGNESIUM mg/dL  --   --  2 1     Results from last 7 days   Lab Units 05/04/23  0520 05/04/23  0144 05/03/23  1739   GLUCOSE RANDOM mg/dL 143* 97 110     Results from last 7 days   Lab Units 05/03/23  2313   CLARITY UA  Slightly Cloudy*   COLOR UA  Yellow   SPEC GRAV UA  1 020   PH UA  7 0   GLUCOSE UA mg/dl Negative   KETONES UA mg/dl 40 (2+)*   BLOOD UA  2+*   PROTEIN UA mg/dl Negative   NITRITE UA  Negative   BILIRUBIN UA  Negative   UROBILINOGEN UA E U /dl 0 2   LEUKOCYTES UA  1+*   WBC UA /hpf 20-30*   RBC UA /hpf 1-2   BACTERIA UA /hpf Innumerable*   EPITHELIAL CELLS WET PREP /hpf Occasional     ED Treatment:   Medication Administration from 05/03/2023 1704 to 05/03/2023 2343       Date/Time Order Dose Route Action     05/03/2023 1744 EDT ketorolac (TORADOL) injection 15 mg 15 mg Intravenous Given     05/03/2023 1743 EDT HYDROmorphone (DILAUDID) injection 0 5 mg 0 5 mg Intravenous Given 05/03/2023 1740 EDT ondansetron (ZOFRAN) injection 4 mg 4 mg Intravenous Given     05/03/2023 1746 EDT sodium chloride 0 9 % bolus 1,000 mL 1,000 mL Intravenous New Bag     05/03/2023 1825 EDT HYDROmorphone (DILAUDID) injection 0 5 mg 0 5 mg Intravenous Given     05/03/2023 1829 EDT tamsulosin (FLOMAX) capsule 0 4 mg 0 4 mg Oral Given     05/03/2023 2042 EDT oxyCODONE-acetaminophen (PERCOCET) 5-325 mg per tablet 1 tablet 1 tablet Oral Given     05/03/2023 2042 EDT HYDROmorphone (DILAUDID) injection 0 5 mg 0 5 mg Intravenous Given     05/03/2023 2126 EDT ondansetron (ZOFRAN) injection 4 mg 4 mg Intravenous Given     05/03/2023 2133 EDT sodium chloride 0 9 % infusion 150 mL/hr Intravenous New Bag     05/03/2023 2126 EDT morphine injection 2 mg 2 mg Intravenous Given     05/03/2023 2314 EDT diazepam (VALIUM) injection 5 mg 5 mg Intravenous Given     05/03/2023 2314 EDT HYDROmorphone (DILAUDID) injection 1 mg 1 mg Intravenous Given     05/03/2023 2314 EDT Famotidine (PF) (PEPCID) injection 20 mg 20 mg Intravenous Given        Past Medical History:   Diagnosis Date    Abdominal pain     Acute cystitis with hematuria 1/3/2020    Brain condition     Pseudotumor Cerebri     Chronic kidney disease     COVID-19 virus infection 11/10/2022    De Quervain's disease (tenosynovitis)     Esophageal perforation     Gastric leak     GERD (gastroesophageal reflux disease)     Headache     Idiopathic intracranial hypertension     Kidney stone     Migraine     No blood products     per pt: personal and Latter day beliefs   surgeon office aware 12/13/19    Papilledema, both eyes     PONV (postoperative nausea and vomiting)     Postgastrectomy malabsorption     Presence of lumboperitoneal shunt     Resolved: Sep 20, 2017    Rotator cuff tendinitis     Resolved: Aug 23, 2017    Visual field defect      Present on Admission:   Obstruction of left ureteropelvic junction (UPJ) due to stone   Gastroesophageal reflux disease      Admitting Diagnosis: Ureterolithiasis [N20 1]  Flank pain [R10 9]  Right ureteral stone [N20 1]  Obstruction of left ureteropelvic junction (UPJ) due to stone [N20 1]  Age/Sex: 49 y o  female  Admission Orders:  NPO  Up & OOB as tolerated  Strain all Urine      Scheduled Medications:  cefTRIAXone, 1,000 mg, Intravenous, Q24H  famotidine, 20 mg, Intravenous, HS  pantoprazole, 40 mg, Intravenous, Q24H MYRNA  tamsulosin, 0 4 mg, Oral, Daily With Dinner      Continuous IV Infusions:  sodium chloride 0 9 % infusion  Rate: 150 mL/hr Dose: 150 mL/hr  Freq: Continuous Route: IV  Last Dose: 150 mL/hr (05/03/23 2133)  Start: 05/03/23 2015 End: 05/04/23 1007  lactated ringers infusion  Rate: 100 mL/hr Dose: 100 mL/hr  Freq: Continuous Route: IV  Indications of Use: IV Hydration  Last Dose: 100 mL/hr (05/04/23 1112)  Start: 05/04/23 1015    PRN Meds:  acetaminophen, 650 mg, Oral, Q4H PRN  diazepam, 5 mg, Intravenous, Q8H PRN   X 1 dose 5/3  HYDROmorphone, 1 mg, Intravenous, Q3H PRN  X 1 dose 5/3;  X 2 doses 5/4  ondansetron, 4 mg, Intravenous, Q6H PRN   X 1 dose 5/3  oxyCODONE, 5 mg, Oral, Q4H PRN  morphine injection 2 mg  Dose: 2 mg    X 1 dose 5/3 then DCed  Freq: Every 3 hours PRN Route: IV  PRN Reason: breakthrough pain  Start: 05/03/23 2012 End: 05/03/23 2146      IP CONSULT TO UROLOGY    Network Utilization Review Department  ATTENTION: Please call with any questions or concerns to [phone] and carefully listen to the prompts so that you are directed to the right person  All voicemails are confidential   Berto Zurita all requests for admission clinical reviews, approved or denied determinations and any other requests to dedicated fax number below belonging to the campus where the patient is receiving treatment   List of dedicated fax numbers for the Facilities:  FACILITY NAME UR FAX NUMBER   ADMISSION DENIALS (Administrative/Medical Necessity) [phone]   [address] (Maternity/NICU/Pediatrics) 261 [facility],7[facility] Floor [facility] [address] [place] [address]  [phone]   [address] [facility] [place] [address] [place] [address] [phone]

## 2023-05-04 NOTE — ANESTHESIA POSTPROCEDURE EVALUATION
Post-Op Assessment Note    CV Status:  Stable  Pain Score: 0    Pain management: adequate     Mental Status:  Sleepy and arousable   Hydration Status:  Euvolemic   PONV Controlled:  Controlled   Airway Patency:  Patent      Post Op Vitals Reviewed: Yes      Staff: CRNA         No notable events documented      BP (!) 83/39 (05/04/23 1814)    Temp 99 8 °F (37 7 °C) (05/04/23 1814)    Pulse 93 (05/04/23 1814)   Resp 20 (05/04/23 1814)    SpO2 100 % (05/04/23 1814) Subjective   Patient ID: Isabel is a 48 year old female.    Chief Complaint   Patient presents with   • Gyn Exam     Isabel Muñoz is a 48 year old female with a PMhx per patient of fibroids who presents to the clinic for WHE. LMP 02/05/2022. Patient reports she is not sure if she had a cycle as she had a cyst rupture at that time which caused vaginal bleeding. Denies Hx of STD or concern for STDs on today.  Patient reports urinary urgency and a small amount of brown vaginal discharge with odor noted.  Denies vaginal itching, dysuria, pelvic pain, abdominal pain, back pain, abnormal uterine bleeding, blood in urine, dyspareunia or genital lesions.  Patient reports a fall at the end of August in which she used her left wrist to break her fall.  Patient reports aggravating factors are moving wrist back-and-forth.  Relieving factors is rest.  Pain level 5-7/10.  Treatments tried rest.  Report overall doing well. No other complaints or concerns.       Patient's medications, allergies, past medical, surgical, social and family histories were reviewed and updated as appropriate.    Review of Systems   Constitutional: Negative.    HENT: Negative.    Eyes: Negative.    Respiratory: Negative.    Cardiovascular: Negative.    Gastrointestinal: Negative.    Endocrine: Negative.    Genitourinary: Positive for urgency and vaginal discharge.   Musculoskeletal: Positive for arthralgias, back pain and myalgias.   Skin: Negative.    Allergic/Immunologic: Negative.    Neurological: Negative.    Hematological: Negative.    Psychiatric/Behavioral: Negative.        Objective   Physical Exam  Vitals and nursing note reviewed.   Constitutional:       General: She is awake. She is not in acute distress.     Appearance: Normal appearance. She is well-developed, well-groomed and normal weight. She is not ill-appearing, toxic-appearing or diaphoretic.   Cardiovascular:      Rate and Rhythm: Normal rate and regular rhythm.      Pulses:  Normal pulses.           Radial pulses are 2+ on the right side and 2+ on the left side.        Popliteal pulses are 2+ on the right side and 2+ on the left side.      Heart sounds: Normal heart sounds, S1 normal and S2 normal.   Pulmonary:      Effort: Pulmonary effort is normal.      Breath sounds: Normal breath sounds and air entry. No decreased breath sounds, wheezing, rhonchi or rales.   Chest:      Chest wall: No mass, lacerations, deformity, swelling, tenderness, crepitus or edema. There is no dullness to percussion.   Breasts:      Lobito Score is 5. Breasts are symmetrical.      Right: Normal.      Left: Normal.        Comments: Breasts symetric no skin changes no nipple d/c no masses    Abdominal:      Hernia: There is no hernia in the left inguinal area or right inguinal area.   Genitourinary:     Exam position: Supine.      Lobito stage (genital): 5.      Labia:         Right: No rash, tenderness, lesion or injury.         Left: No rash, tenderness, lesion or injury.       Urethra: No prolapse, urethral pain, urethral swelling or urethral lesion.      Vagina: Normal. No signs of injury and foreign body. No vaginal discharge, erythema, tenderness, bleeding, lesions or prolapsed vaginal walls.      Cervix: Cervical bleeding present.      Uterus: Normal.       Adnexa: Right adnexa normal and left adnexa normal.      Comments: Scant amount of blood noted near cervical os post cervical screening. No vaginal discharge noted.  No lesions noted.  No odor appreciated.  No tenderness or adnexal tenderness on bimanual exam.    Musculoskeletal:      Right wrist: Normal.      Left wrist: Tenderness present. No swelling, deformity, effusion, lacerations, bony tenderness, snuff box tenderness or crepitus. Decreased range of motion. Normal pulse.      Right lower leg: No edema.      Left lower leg: No edema.   Lymphadenopathy:      Lower Body: No right inguinal adenopathy. No left inguinal adenopathy.   Skin:      Capillary Refill: Capillary refill takes less than 2 seconds.   Neurological:      General: No focal deficit present.      Mental Status: She is alert and oriented to person, place, and time.   Psychiatric:         Attention and Perception: Attention and perception normal.         Mood and Affect: Mood and affect normal.         Speech: Speech normal.         Behavior: Behavior normal. Behavior is cooperative.         Thought Content: Thought content normal.         Cognition and Memory: Cognition and memory normal.         Judgment: Judgment normal.       Results for orders placed or performed in visit on 10/28/22   POCT URINE DIP AUTO   Result Value Ref Range    POCT Color Yaz     POCT Appearance Clear     POCT Glucose Urine Negative Negative mg/dL    POCT Bilirubin Negative Negative    POCT Ketones Negative Negative mg/dL    POCT Specific Gravity 1.015 1.005 - 1.030    POCT Occult Blood Trace - Intact (A) Negative    POCT pH 6.5 5 - 7    POCT Protein Negative Negative mg/dL    POCT Urobilinogen 0.2 0.0 - 1.0 mg/dL    Urine Nitrite Negative Negative    WBC (Leukocyte) Esterase POC Negative Negative       Assessment   Problem List Items Addressed This Visit    None     Visit Diagnoses     Cervical cancer screening    -  Primary    Relevant Orders    PAP ORDER    CHLAMYDIA/GONORRHEA BY NUCLEIC ACID AMPLIFICATION    SWABONE VAGINITIS PANEL    POCT URINE DIP AUTO (Completed)    URINE, BACTERIAL CULTURE    HPV, HIGH RISK    Abnormal uterine bleeding        Relevant Orders    SERVICE TO OB GYN    Left wrist pain        Relevant Orders    XR WRIST MIN 3 VIEWS LEFT    RHEUMATOID FACTOR    SEDIMENTATION RATE WESTERGREN    JOSE SCREEN WITH ANTIBODY AND IFA REFLEX    C REACTIVE PROTEIN    SERVICE TO ORTHOPEDICS        Pap smears and HPV testing as according to ACOG guidelines.  Mammograms yearly  Continue with yearly breast and pelvic exams.     Health Maintenance Summary     Hepatitis B Vaccine (1 of 3 - 3-dose series)   Overdue - never done    COVID-19 Vaccine (1)  Overdue - never done    DTaP/Tdap/Td Vaccine (1 - Tdap)  Overdue - never done    Cervical Cancer Screen 30-64 - (PAP/HPV Co-Testing - Every 5 Years)  Ordered on 10/28/2022    Colorectal Cancer Screen- (Colonoscopy - Every 10 Years)  Ordered on 10/28/2022    Influenza Vaccine (1)  Overdue - never done    Breast Cancer Screening (Yearly)  Next due on 4/6/2023    Depression Screening (Yearly)  Next due on 9/27/2023    Meningococcal Vaccine   Aged Out    HPV Vaccine   Aged Out    Pneumococcal Vaccine 0-64   Aged Out          Schedule follow up: 1 week for annual examination        PLAN:  1. Pap smear for cervical cancer screening  -Pap smear test ordered-Future  -Vaginitis panel-Future  -Gonorrhea/chlamydia and trichomonas ordered-Future  -Urine dip -negative for UTI  -Urine culture-Future  -Follow-up as needed    2. Screening for breast exam:  -Breast exam normal  -Encouraged to perform monthly breast exams     3. Left wrist pain  Wrist splint was applied  -Labs ordered for inflammatory markers-Future  RICE intervention  Motrin PRN for pain  Follow-up with primary care physician  Splint Application    Date/Time: 10/28/2022 11:24 AM  Performed by: OTF Williamson  Authorized by: OTF Williamson     Consent:     Consent obtained:  Verbal    Consent given by:  Patient    Risks, benefits, and alternatives were discussed: yes      Risks discussed:  Discoloration, numbness, pain and swelling    Alternatives discussed:  No treatment  Universal protocol:     Patient identity confirmed:  Verbally with patient  Pre-procedure details:     Distal neurologic exam:  Normal  Procedure details:     Location:  Wrist    Wrist location:  L wrist    Strapping: yes      Cast type:  Short arm    Supplies:  Prefabricated splint  Post-procedure details:     Distal neurologic exam:  Normal    Distal perfusion: distal pulses strong      Procedure completion:  Tolerated

## 2023-05-04 NOTE — ASSESSMENT & PLAN NOTE
Patient presented with left flank pain radiating to groin and vomiting, has hx of nephrolitiasis  Received multiple doses of medication for pain control in ED  · Urology consult pending message sent to urology team   · Personal discussion with specialty patient to OR today for bilateral stents  · Continue IVF  · Pain control  · Flomax  · Strain urine  · CT: There is an obstructing 7 mm stone in the left UPJ  Urology consultation advised

## 2023-05-04 NOTE — PROGRESS NOTES
[facility]  Progress Note  Name: April Hinojosa  MRN: [id number]  Unit/Bed#: -01 I Date of Admission: 5/3/2023   Date of Service: 5/4/2023 I Hospital Day: 0    Assessment/Plan   * Obstruction of left ureteropelvic junction (UPJ) due to stone  Assessment & Plan  Patient presented with left flank pain radiating to groin and vomiting, has hx of nephrolitiasis  Received multiple doses of medication for pain control in ED  · Urology consult pending message sent to urology team   · Continue IVF  · Pain control  · Flomax  · Strain urine  · CT: There is an obstructing 7 mm stone in the left UPJ  Urology consultation advised  Hypokalemia  Assessment & Plan  · Mild at 3 3 likely in setting of GI loss  · Will switch patient to LR for now    Gastroesophageal reflux disease  Assessment & Plan  · Continue PPI and pepcid -   · Continue IV for now w/ vomiting               VTE Pharmacologic Prophylaxis: VTE Score: 1 Low Risk (Score 0-2) - Encourage Ambulation  Patient Centered Rounds: I performed bedside rounds with nursing staff today  Discussions with Specialists or Other Care Team Provider: message sent to urology    Education and Discussions with Family / Patient: Updated  () at bedside  Total Time Spent on Date of Encounter in care of patient: 35 minutes This time was spent on one or more of the following: performing physical exam; counseling and coordination of care; obtaining or reviewing history; documenting in the medical record; reviewing/ordering tests, medications or procedures; communicating with other healthcare professionals and discussing with patient's family/caregivers      Current Length of Stay: 0 day(s)  Current Patient Status: Observation   Certification Statement: pending urology evaluation and likely intervention discharge later today vs tomorrow   Discharge Plan: next 24 hours pending urology evaluation and recommendations     Code Status: Level 1 - Full Code    Subjective:   Patient reports history of 30+ when she developed the symptoms prompting her to come to the ED she states never happened on all sides remains agreeable to the OR    Objective:     Vitals:   Temp (24hrs), Av 6 °F (37 °C), Min:98 2 °F (36 8 °C), Max:99 °F (37 2 °C)    Temp:  [98 2 °F (36 8 °C)-99 °F (37 2 °C)] 99 °F (37 2 °C)  HR:  [] 92  Resp:  [17-20] 17  BP: ()/(54-99) 95/55  SpO2:  [94 %-96 %] 94 %  Body mass index is 24 8 kg/m²  Input and Output Summary (last 24 hours): Intake/Output Summary (Last 24 hours) at 2023 1007  Last data filed at 2023 0851  Gross per 24 hour   Intake 1000 ml   Output 625 ml   Net 375 ml       Physical Exam:   Physical Exam  Vitals and nursing note reviewed  Constitutional:       General: She is not in acute distress  Appearance: She is well-developed  She is ill-appearing  Comments: Patient appears in pain   HENT:      Head: Normocephalic and atraumatic  Eyes:      Conjunctiva/sclera: Conjunctivae normal    Cardiovascular:      Rate and Rhythm: Normal rate and regular rhythm  Heart sounds: No murmur heard  Pulmonary:      Effort: Pulmonary effort is normal  No respiratory distress  Breath sounds: Normal breath sounds  Abdominal:      Palpations: Abdomen is soft  Tenderness: There is no abdominal tenderness  Musculoskeletal:         General: Tenderness present  No swelling  Cervical back: Neck supple  Skin:     General: Skin is warm and dry  Capillary Refill: Capillary refill takes less than 2 seconds  Neurological:      Mental Status: She is alert and oriented to person, place, and time     Psychiatric:         Mood and Affect: Mood normal            Additional Data:     Labs:  Results from last 7 days   Lab Units 23  0520 23  1739   WBC Thousand/uL 5 28 6 70   HEMOGLOBIN g/dL 14 7 16 9*   HEMATOCRIT % 44 7 49 9*   PLATELETS Thousands/uL 120* 208   NEUTROS PCT % --  62   LYMPHS PCT %  --  26   MONOS PCT %  --  6   EOS PCT %  --  5     Results from last 7 days   Lab Units 05/04/23  0520   SODIUM mmol/L 138   POTASSIUM mmol/L 3 3*   CHLORIDE mmol/L 106   CO2 mmol/L 24   BUN mg/dL 15   CREATININE mg/dL 0 97   ANION GAP mmol/L 8   CALCIUM mg/dL 8 3*   GLUCOSE RANDOM mg/dL 143*                       Lines/Drains:  Invasive Devices     Peripheral Intravenous Line  Duration           Peripheral IV 05/03/23 Right Antecubital <1 day                      Imaging: Reviewed radiology reports from this admission including: abdominal/pelvic CT    Recent Cultures (last 7 days):         Last 24 Hours Medication List:   Current Facility-Administered Medications   Medication Dose Route Frequency Provider Last Rate    acetaminophen  650 mg Oral Q4H PRN Leona Reinoso PA-C      cefTRIAXone  1,000 mg Intravenous Q24H CHELI HollisC 1,000 mg (05/04/23 0241)    diazepam  5 mg Intravenous Q8H PRN Leona Reinoso PA-C      famotidine  20 mg Intravenous HS Leona Reinoso PA-C      HYDROmorphone  1 mg Intravenous Q3H PRN Leona Reinoso PA-C      lactated ringers  100 mL/hr Intravenous Continuous ABNER Sterling      ondansetron  4 mg Intravenous Q6H PRN Leona Reinoso PA-C      oxyCODONE  5 mg Oral Q4H PRN Leona Reinoso PA-C      pantoprazole  40 mg Intravenous Q24H [facility] [place], [place]      tamsulosin  0 4 mg Oral Daily With Dinner Leona Reinoso PA-C          Today, Patient Was Seen By: ABNER Sterling    **Please Note: This note may have been constructed using a voice recognition system  **

## 2023-05-04 NOTE — OP NOTE
OPERATIVE REPORT  PATIENT NAME: Alpesh Greenberg    :  1975  MRN: [id number]  Pt Location: [facility] OR ROOM 01    SURGERY DATE: 2023    Surgeon(s) and Role:     * Griselda Tijerina MD - Primary    Preop Diagnosis:  Obstruction of left ureteropelvic junction (UPJ) due to stone [N20 1]  Right ureteral stone [N20 1]    Post-Op Diagnosis Codes:     * Obstruction of left ureteropelvic junction (UPJ) due to stone [N20 1]     * Right ureteral stone [N20 1]    Procedure(s):  Bilateral - CYSTOSCOPY RETROGRADE PYELOGRAM WITH INSERTION STENT URETERAL    Specimen(s):  ID Type Source Tests Collected by Time Destination   A : BLADDER URINE  Urine Urinary Bladder URINE CULTURE, URINALYSIS WITH REFLEX TO SCOPE Griselda Tijerina MD 2023 1740    B : LEFT KIDNEY URINE CULTURE Urine Kidney, Left URINE CULTURE, URINALYSIS WITH REFLEX TO SCOPE Griselda Tijerina MD 2023 1746    C : RIGHT KIDNEY URINE CULTURE Urine Kidney, Right URINE CULTURE, URINALYSIS WITH REFLEX TO SCOPE Griselda Tijerina MD 2023 1755        Estimated Blood Loss:   None    Drains:  Ureteral Internal Stent Left ureter 6 Fr  (Active)   Number of days: 0       Ureteral Internal Stent Right ureter 6 Fr  (Active)   Number of days: 0       Anesthesia Type:   General    Operative Indications:  Obstruction of left ureteropelvic junction (UPJ) due to stone [N20 1]  Right ureteral stone [N20 1]  This is a 54-year-old female presenting with significant left renal colic refractory to inpatient pain management  CT scan showed a 7 mm obstructing stone near the left UPJ, but also showed a 6 mm proximal to mid right ureteral stone with no significant hydronephrosis  Operative Findings:  Bilateral ureteral stones seen that correspond to prior KUB and CT scan  There is significant hydroureteronephrosis above the left ureteral stone and mild hydroureteronephrosis above the right ureteral stone      Complications:   None    Procedure and Technique:  The patient was properly identified in the operating room and after adequate general anesthesia the patient was placed in lithotomy position  The lower abdomen and genitalia were prepped and draped in the usual fashion  21 [place]ian cystoscope was inserted into the bladder and urine was drained and sent for urinalysis and culture  Cystoscopy was performed with 70 degree and 30 degree lenses  The bladder was smooth with no masses or calcifications  The ureteral orifices were normal bilaterally  A 5 [place] open-ended ureteral catheter was passed into the left ureteral orifice and contrast material was instilled under fluoroscopic guidance  The course and caliber of the ureter was normal until the proximal ureter at which point there was a filling defect consistent with the stone  There was moderate dilation above the stone  A Solo guidewire was passed through the catheter into the collecting system and the catheter followed  The wire was removed  A very rapid drip of clear yellow urine was drained and sent for culture  The wire was replaced into the upper pole  The ureteral length was measured  A 6 [place] 26 cm Bard inlay [employer] stent was passed over the wire and the wire was removed leaving a good curl of stent within the renal pelvis and within the bladder  The ureteral catheter was then passed into the right ureteral orifice and contrast material was instilled  The course and caliber of the ureter was normal until the level of the proximal to mid ureter at which point there was a filling defect and narrowing of the ureter consistent with the known ureteral stone  There was mild dilation of the ureter and collecting system above  The wire was passed through the catheter into the collecting system and the catheter followed  The wire was removed  A rapid drip of clear yellow urine was drained and sent for culture  The collecting system was mildly dilated with no filling defects    The wire was replaced into the upper pole   The ureteral length was measured and the catheter was removed  A 6 Citizen of [place] 24 cm Bard inlay [place] stent was passed over the wire and the wire was removed leaving the tip of the stent within the upper pole and a good curl within the bladder  The bladder was emptied and the cystoscope was removed  The patient tolerated the procedure well and left the operating room in good condition  I was present for the entire procedure      Patient Disposition:  PACU         SIGNATURE: Dilma Norman MD  DATE: May 4, 2023  TIME: 6:30 PM

## 2023-05-05 ENCOUNTER — APPOINTMENT (OUTPATIENT)
Dept: RADIOLOGY | Facility: HOSPITAL | Age: 48
End: 2023-05-05

## 2023-05-05 PROBLEM — Z12.11 COLON CANCER SCREENING: Status: RESOLVED | Noted: 2021-10-21 | Resolved: 2023-05-05

## 2023-05-05 PROBLEM — E87.6 HYPOKALEMIA: Status: RESOLVED | Noted: 2023-05-04 | Resolved: 2023-05-05

## 2023-05-05 LAB
ANION GAP SERPL CALCULATED.3IONS-SCNC: 9 MMOL/L (ref 4–13)
BUN SERPL-MCNC: 24 MG/DL (ref 5–25)
CALCIUM SERPL-MCNC: 8.4 MG/DL (ref 8.4–10.2)
CHLORIDE SERPL-SCNC: 106 MMOL/L (ref 96–108)
CO2 SERPL-SCNC: 23 MMOL/L (ref 21–32)
CREAT SERPL-MCNC: 1.06 MG/DL (ref 0.6–1.3)
GFR SERPL CREATININE-BSD FRML MDRD: 62 ML/MIN/1.73SQ M
GLUCOSE SERPL-MCNC: 79 MG/DL (ref 65–140)
POTASSIUM SERPL-SCNC: 4 MMOL/L (ref 3.5–5.3)
SODIUM SERPL-SCNC: 138 MMOL/L (ref 135–147)

## 2023-05-05 RX ORDER — OXYBUTYNIN CHLORIDE 5 MG/1
5 TABLET ORAL 3 TIMES DAILY
Status: DISCONTINUED | OUTPATIENT
Start: 2023-05-05 | End: 2023-05-07 | Stop reason: HOSPADM

## 2023-05-05 RX ORDER — METOCLOPRAMIDE HYDROCHLORIDE 5 MG/ML
10 INJECTION INTRAMUSCULAR; INTRAVENOUS ONCE
Status: COMPLETED | OUTPATIENT
Start: 2023-05-05 | End: 2023-05-05

## 2023-05-05 RX ORDER — HYDROMORPHONE HCL 110MG/55ML
2 PATIENT CONTROLLED ANALGESIA SYRINGE INTRAVENOUS EVERY 4 HOURS PRN
Status: DISCONTINUED | OUTPATIENT
Start: 2023-05-05 | End: 2023-05-07 | Stop reason: HOSPADM

## 2023-05-05 RX ORDER — AMOXICILLIN 250 MG
1 CAPSULE ORAL 2 TIMES DAILY
Status: DISCONTINUED | OUTPATIENT
Start: 2023-05-05 | End: 2023-05-07 | Stop reason: HOSPADM

## 2023-05-05 RX ADMIN — SODIUM CHLORIDE, SODIUM LACTATE, POTASSIUM CHLORIDE, AND CALCIUM CHLORIDE 100 ML/HR: .6; .31; .03; .02 INJECTION, SOLUTION INTRAVENOUS at 20:00

## 2023-05-05 RX ADMIN — HYDROMORPHONE HYDROCHLORIDE 2 MG: 2 INJECTION, SOLUTION INTRAMUSCULAR; INTRAVENOUS; SUBCUTANEOUS at 17:31

## 2023-05-05 RX ADMIN — OXYBUTYNIN CHLORIDE 5 MG: 5 TABLET ORAL at 21:47

## 2023-05-05 RX ADMIN — TAMSULOSIN HYDROCHLORIDE 0.4 MG: 0.4 CAPSULE ORAL at 17:32

## 2023-05-05 RX ADMIN — HYDROMORPHONE HYDROCHLORIDE 1 MG: 1 INJECTION, SOLUTION INTRAMUSCULAR; INTRAVENOUS; SUBCUTANEOUS at 02:51

## 2023-05-05 RX ADMIN — HYDROMORPHONE HYDROCHLORIDE 2 MG: 2 INJECTION, SOLUTION INTRAMUSCULAR; INTRAVENOUS; SUBCUTANEOUS at 11:11

## 2023-05-05 RX ADMIN — SENNOSIDES AND DOCUSATE SODIUM 1 TABLET: 50; 8.6 TABLET ORAL at 10:03

## 2023-05-05 RX ADMIN — SODIUM CHLORIDE, SODIUM LACTATE, POTASSIUM CHLORIDE, AND CALCIUM CHLORIDE 100 ML/HR: .6; .31; .03; .02 INJECTION, SOLUTION INTRAVENOUS at 10:04

## 2023-05-05 RX ADMIN — ONDANSETRON 4 MG: 2 INJECTION INTRAMUSCULAR; INTRAVENOUS at 17:37

## 2023-05-05 RX ADMIN — ONDANSETRON 4 MG: 2 INJECTION INTRAMUSCULAR; INTRAVENOUS at 10:15

## 2023-05-05 RX ADMIN — METOCLOPRAMIDE 10 MG: 5 INJECTION, SOLUTION INTRAMUSCULAR; INTRAVENOUS at 19:58

## 2023-05-05 RX ADMIN — OXYBUTYNIN CHLORIDE 5 MG: 5 TABLET ORAL at 17:32

## 2023-05-05 RX ADMIN — OXYBUTYNIN CHLORIDE 5 MG: 5 TABLET ORAL at 10:03

## 2023-05-05 RX ADMIN — PANTOPRAZOLE SODIUM 40 MG: 40 INJECTION, POWDER, FOR SOLUTION INTRAVENOUS at 10:03

## 2023-05-05 RX ADMIN — HYDROMORPHONE HYDROCHLORIDE 2 MG: 2 INJECTION, SOLUTION INTRAMUSCULAR; INTRAVENOUS; SUBCUTANEOUS at 21:47

## 2023-05-05 RX ADMIN — HYDROMORPHONE HYDROCHLORIDE 1 MG: 1 INJECTION, SOLUTION INTRAMUSCULAR; INTRAVENOUS; SUBCUTANEOUS at 08:17

## 2023-05-05 RX ADMIN — SENNOSIDES AND DOCUSATE SODIUM 1 TABLET: 50; 8.6 TABLET ORAL at 17:32

## 2023-05-05 RX ADMIN — CEFTRIAXONE 1000 MG: 1 INJECTION, SOLUTION INTRAVENOUS at 02:51

## 2023-05-05 RX ADMIN — FAMOTIDINE 20 MG: 10 INJECTION INTRAVENOUS at 21:47

## 2023-05-05 NOTE — UTILIZATION REVIEW
"Continued Stay Review    WAS OBSERVATION 05/03/2023 @ 1958, [address] 05/05/2023 @ 1010, DUE TO CONTINUED STAY REQUIRED TO CARE FOR PATIENT WITH   will now require > 2 midnight hospital stay due to ongoing significant /GI pain following bilateral ureteral stents  Date: 05/05/2023 05/05/23 1010  Inpatient Admission  Once        Transfer Service: Hospitalist       Question Answer Comment   Level of Care Med Surg    Estimated length of stay More than 2 Midnights    Certification I certify that inpatient services are medically necessary for this patient for a duration of greater than two midnights  See H&P and MD Progress Notes for additional information about the patient's course of treatment  Current Patient Class: Inpatient Current Level of Care: Med/Surg    HPI:47 y o  female initially admitted on 05/03/2023     Assessment/Plan: POD #1, S/p OR for bilateral ureteral stents  Continue IVF  Continue IC Abx  Pain control; increase dilaudid and give dose of valium as well as oxybutinin  Flomax      Vital Signs: /61   Pulse 62   Temp 98 8 °F (37 1 °C)   Resp 16   Ht 5' 3\" (1 6 m)   Wt 63 5 kg (139 lb 15 9 oz)   LMP  (LMP Unknown)   SpO2 92%   BMI 24 80 kg/m²     Pertinent Labs/Diagnostic Results:     Results from last 7 days   Lab Units 05/04/23  0520 05/03/23  1739   WBC Thousand/uL 5 28 6 70   HEMOGLOBIN g/dL 14 7 16 9*   HEMATOCRIT % 44 7 49 9*   PLATELETS Thousands/uL 120* 208   NEUTROS ABS Thousands/µL  --  4 15     Results from last 7 days   Lab Units 05/05/23  0421 05/04/23  0520 05/04/23  0144 05/03/23  1739   SODIUM mmol/L 138 138 138 137   POTASSIUM mmol/L 4 0 3 3* 3 9 4 6   CHLORIDE mmol/L 106 106 106 103   CO2 mmol/L 23 24 25 26   ANION GAP mmol/L 9 8 7 8   BUN mg/dL 24 15 15 14   CREATININE mg/dL 1 06 0 97 0 78 0 72   EGFR ml/min/1 73sq m 62 69 90 99   CALCIUM mg/dL 8 4 8 3* 8 8 9 5   MAGNESIUM mg/dL  --   --   --  2 1 " Results from last 7 days   Lab Units 05/05/23  0421 05/04/23  0520 05/04/23  0144 05/03/23  1739   GLUCOSE RANDOM mg/dL 79 143* 97 110     Results from last 7 days   Lab Units 05/04/23  1755 05/04/23  1746 05/04/23  1740   CLARITY UA  Slightly Cloudy* Cloudy* Cloudy*   COLOR UA  Ct* Ct* Ct*   SPEC GRAV UA  1 010 1 020 >=1 030*   PH UA  5 5 5 5 6 0   GLUCOSE UA mg/dl Negative Trace* Negative   KETONES UA mg/dl Trace* Trace* Trace*   BLOOD UA  3+* 3+* 3+*   PROTEIN UA mg/dl 1+* 2+* 1+*   NITRITE UA  Negative Negative Positive*   BILIRUBIN UA  Negative 2+* Negative   UROBILINOGEN UA E U /dl 0 2 0 2 0 2   LEUKOCYTES UA  Negative 2+* 1+*   WBC UA /hpf 0-1 20-30* 30-50*   RBC UA /hpf 10-20* 10-20* 10-20*   BACTERIA UA /hpf None Seen Occasional Moderate*   EPITHELIAL CELLS WET PREP /hpf Occasional Occasional Occasional   MUCUS THREADS  Occasional* Occasional* Moderate*     Results from last 7 days   Lab Units 05/03/23  2313   URINE CULTURE  >100,000 cfu/ml Gram Negative [place] Enter[facility] Like*     Medications:   Scheduled Medications:  cefTRIAXone, 1,000 mg, Intravenous, Q24H  famotidine, 20 mg, Intravenous, HS  oxybutynin, 5 mg, Oral, TID  pantoprazole, 40 mg, Intravenous, Q24H MYRNA  tamsulosin, 0 4 mg, Oral, Daily With Dinner      Continuous IV Infusions:  lactated ringers, 100 mL/hr, Intravenous, Continuous  lactated ringers infusion  Rate: 20 mL/hr Dose: 20 mL/hr  Freq: Continuous Route: IV  Indications of Use: IV Hydration  Last Dose: Stopped (05/04/23 2330)  Start: 05/04/23 2245 End: 05/04/23 2244  sodium chloride 0 9 % infusion  Rate: 150 mL/hr Dose: 150 mL/hr  Freq: Continuous Route: IV  Last Dose: 150 mL/hr (05/03/23 2133)  Start: 05/03/23 2015 End: 05/04/23 1007    PRN Meds:  acetaminophen, 650 mg, Oral, Q4H PRN  diazepam, 5 mg, Intravenous, Q8H PRN  X 1 dose 5/3; x 1 dose 5/4  HYDROmorphone, 1 mg, Intravenous, Q3H PRN  X 1 dose 5/3;  X 4 doses 5/4;  X 2 doses 5/5  ondansetron, 4 mg, Intravenous, Q6H PRN  X 1 dose 5/3; x 1 dose 5/4;  X 1 dose 5/5  oxyCODONE, 5 mg, Oral, Q4H PRN  X 1 dose 5/4  HYDROmorphone (DILAUDID) injection 2 mg  Dose: 2 mg   X 1 dose 5/5  Freq: Every 4 hours PRN Route: IV  PRN Reasons: severe pain,breakthrough pain  Start: 05/05/23 0945      Discharge Plan: TBD    Network Utilization Review Department  ATTENTION: Please call with any questions or concerns to [phone] and carefully listen to the prompts so that you are directed to the right person  All voicemails are confidential   Lara Parry all requests for admission clinical reviews, approved or denied determinations and any other requests to dedicated fax number below belonging to the campus where the patient is receiving treatment   List of dedicated fax numbers for the Facilities:  [address] DENIALS (Administrative/Medical Necessity) [phone]   [address] (Maternity/NICU/Pediatrics) [phone]   [address] [place] [address]  [phone]   [address] [facility] [place] [address] [place] [facility] [address] [place] [address] [facility] [facility] [phone]

## 2023-05-05 NOTE — PLAN OF CARE
Problem: PAIN - ADULT  Goal: Verbalizes/displays adequate comfort level or baseline comfort level  Description: Interventions:  - Encourage patient to monitor pain and request assistance  - Assess pain using appropriate pain scale  - Administer analgesics based on type and severity of pain and evaluate response  - Implement non-pharmacological measures as appropriate and evaluate response  - Consider cultural and social influences on pain and pain management  - Notify physician/advanced practitioner if interventions unsuccessful or patient reports new pain  Outcome: Not Progressing     Problem: SAFETY ADULT  Goal: Maintain or return to baseline ADL function  Description: INTERVENTIONS:  -  Assess patient's ability to carry out ADLs; assess patient's baseline for ADL function and identify physical deficits which impact ability to perform ADLs (bathing, care of mouth/teeth, toileting, grooming, dressing, etc )  - Assess/evaluate cause of self-care deficits   - Assess range of motion  - Assess patient's mobility; develop plan if impaired  - Assess patient's need for assistive devices and provide as appropriate  - Encourage maximum independence but intervene and supervise when necessary  - Involve family in performance of ADLs  - Assess for home care needs following discharge   - Consider OT consult to assist with ADL evaluation and planning for discharge  - Provide patient education as appropriate  Outcome: Not Progressing     Problem: Knowledge Deficit  Goal: Patient/family/caregiver demonstrates understanding of disease process, treatment plan, medications, and discharge instructions  Description: Complete learning assessment and assess knowledge base    Interventions:  - Provide teaching at level of understanding  - Provide teaching via preferred learning methods  Outcome: Progressing     Problem: Nutrition/Hydration-ADULT  Goal: Nutrient/Hydration intake appropriate for improving, restoring or maintaining nutritional needs  Description: Monitor and assess patient's nutrition/hydration status for malnutrition  Collaborate with interdisciplinary team and initiate plan and interventions as ordered  Monitor patient's weight and dietary intake as ordered or per policy  Utilize nutrition screening tool and intervene as necessary  Determine patient's food preferences and provide high-protein, high-caloric foods as appropriate       INTERVENTIONS:  - Monitor oral intake, urinary output, labs, and treatment plans  - Assess nutrition and hydration status and recommend course of action  - Evaluate amount of meals eaten  - Assist patient with eating if necessary   - Allow adequate time for meals  - Recommend/ encourage appropriate diets, oral nutritional supplements, and vitamin/mineral supplements  - Order, calculate, and assess calorie counts as needed  - Recommend, monitor, and adjust tube feedings and TPN/PPN based on assessed needs  - Assess need for intravenous fluids  - Provide specific nutrition/hydration education as appropriate  - Include patient/family/caregiver in decisions related to nutrition  Outcome: Progressing     Problem: MOBILITY - ADULT  Goal: Maintain or return to baseline ADL function  Description: INTERVENTIONS:  -  Assess patient's ability to carry out ADLs; assess patient's baseline for ADL function and identify physical deficits which impact ability to perform ADLs (bathing, care of mouth/teeth, toileting, grooming, dressing, etc )  - Assess/evaluate cause of self-care deficits   - Assess range of motion  - Assess patient's mobility; develop plan if impaired  - Assess patient's need for assistive devices and provide as appropriate  - Encourage maximum independence but intervene and supervise when necessary  - Involve family in performance of ADLs  - Assess for home care needs following discharge   - Consider OT consult to assist with ADL evaluation and planning for discharge  - Provide patient education as appropriate  Outcome: Not Progressing

## 2023-05-05 NOTE — PROGRESS NOTES
[facility]  65   Progress Note  Name: Eloise Gomez  MRN: [id number]  Unit/Bed#: -01 I Date of Admission: 5/3/2023   Date of Service: 5/5/2023 I Hospital Day: 0    Assessment/Plan   * Obstruction of left ureteropelvic junction (UPJ) due to stone  Assessment & Plan  Patient presented with left flank pain radiating to groin and vomiting, has hx of nephrolitiasis  Received multiple doses of medication for pain control in ED  · Urology consulted,   · S/p OR for bilateral ureteral stents  · Continue IVF  · Pain control; increase dilaudid and give dose of valium as well as oxybutinin  · Flomax  · CT: There is an obstructing 7 mm stone in the left UPJ  Urology consultation advised  Hypokalemia  Assessment & Plan  · Mild at 3 3 likely in setting of GI loss  · Resolved    Gastroesophageal reflux disease  Assessment & Plan  · Continue PPI and pepcid        VTE Pharmacologic Prophylaxis: VTE Score: 1 Low Risk (Score 0-2) - Encourage Ambulation  Patient Centered Rounds: I performed bedside rounds with nursing staff today  Discussions with Specialists or Other Care Team Provider: will follow up with urology after KUB    Education and Discussions with Family / Patient: Updated  () at bedside  Total Time Spent on Date of Encounter in care of patient: 35 minutes This time was spent on one or more of the following: performing physical exam; counseling and coordination of care; obtaining or reviewing history; documenting in the medical record; reviewing/ordering tests, medications or procedures; communicating with other healthcare professionals and discussing with patient's family/caregivers      Current Length of Stay: 0 day(s)  Current Patient Status: Observation   Certification Statement: The patient, admitted on an observation basis, will now require > 2 midnight hospital stay due to ongoing significant /GI pain following bilateral ureteral stents  Discharge Plan: Anticipate discharge tomorrow to home  Code Status: Level 1 - Full Code    Subjective:   Patient complains of 10/10 pain, L>R with radiation to the groin  She also notes left thigh shooting pain with ambulation new for her since procedure as well  No fevers or chills  No emesis  No BM yet, but she reports good BM prior to hospitalization yesterday  Objective:     Vitals:   Temp (24hrs), Av 1 °F (37 3 °C), Min:98 7 °F (37 1 °C), Max:99 8 °F (37 7 °C)    Temp:  [98 7 °F (37 1 °C)-99 8 °F (37 7 °C)] 98 8 °F (37 1 °C)  HR:  [] 62  Resp:  [16-22] 16  BP: ()/(39-61) 107/61  SpO2:  [92 %-100 %] 92 %  Body mass index is 24 8 kg/m²  Input and Output Summary (last 24 hours): Intake/Output Summary (Last 24 hours) at 2023 1004  Last data filed at 2023 0936  Gross per 24 hour   Intake 2441 67 ml   Output 700 ml   Net 1741 67 ml       Physical Exam:   Physical Exam  Vitals and nursing note reviewed  Constitutional:       Appearance: Normal appearance  She is not ill-appearing or toxic-appearing  Cardiovascular:      Rate and Rhythm: Normal rate and regular rhythm  Heart sounds: Normal heart sounds  Pulmonary:      Effort: Pulmonary effort is normal  No respiratory distress  Breath sounds: Normal breath sounds  No wheezing  Abdominal:      General: Bowel sounds are normal  There is no distension  Palpations: Abdomen is soft  Tenderness: There is abdominal tenderness  Skin:     General: Skin is warm and dry  Coloration: Skin is not pale  Neurological:      Mental Status: She is alert and oriented to person, place, and time             Additional Data:     Labs:  Results from last 7 days   Lab Units 23  0520 23  1739   WBC Thousand/uL 5 28 6 70   HEMOGLOBIN g/dL 14 7 16 9*   HEMATOCRIT % 44 7 49 9*   PLATELETS Thousands/uL 120* 208   NEUTROS PCT %  --  62   LYMPHS PCT %  --  26   MONOS PCT %  --  6   EOS PCT %  --  5     Results from last 7 days   Lab Units 05/05/23  0421   SODIUM mmol/L 138   POTASSIUM mmol/L 4 0   CHLORIDE mmol/L 106   CO2 mmol/L 23   BUN mg/dL 24   CREATININE mg/dL 1 06   ANION GAP mmol/L 9   CALCIUM mg/dL 8 4   GLUCOSE RANDOM mg/dL 79                       Lines/Drains:  Invasive Devices     Peripheral Intravenous Line  Duration           Peripheral IV 05/03/23 Right Antecubital 1 day    Peripheral IV 05/05/23 Left;Ventral (anterior) Forearm <1 day          Drain  Duration           Ureteral Internal Stent Left ureter 6 Fr  <1 day    Ureteral Internal Stent Right ureter 6 Fr  <1 day                Imaging: Reviewed radiology reports from this admission including: abdominal/pelvic CT   KUB pending     Recent Cultures (last 7 days):   Results from last 7 days   Lab Units 05/03/23  2313   URINE CULTURE  >100,000 cfu/ml Gram Negative [place] Enter[facility] Like*       Last 24 Hours Medication List:   Current Facility-Administered Medications   Medication Dose Route Frequency Provider Last Rate    acetaminophen  650 mg Oral Q4H PRN Gayle Hankins PA-C      cefTRIAXone  1,000 mg Intravenous Q24H Gayle Hankins PA-C 1,000 mg (05/05/23 0251)    diazepam  5 mg Intravenous Q8H PRN Gayle Hankins PA-C      famotidine  20 mg Intravenous HS Gayle Hankins PA-C      HYDROmorphone  2 mg Intravenous Q4H PRN Sharron Roberts PA-C      lactated ringers  100 mL/hr Intravenous Continuous [place], CRNP 100 mL/hr (05/05/23 1004)    ondansetron  4 mg Intravenous Q6H PRN Gayle Hankins PA-C      oxybutynin  5 mg Oral TID Sharron Roberts PA-C      oxyCODONE  5 mg Oral Q4H PRN Gayle Hankins PA-C      pantoprazole  40 mg Intravenous Q24H [facility] Nicolette Amezcua [place], [place]      senna-docusate sodium  1 tablet Oral BID Sharron Roberts PA-C      tamsulosin  0 4 mg Oral Daily With Dinner Gayle Hankins PA-C          Today, Patient Was Seen By: John Flores PA-C    **Please Note: This note may have been constructed using a voice recognition system  **

## 2023-05-05 NOTE — ASSESSMENT & PLAN NOTE
· Patient presented with left flank pain radiating to groin and vomiting, has hx of nephrolitiasis     · 5/3 CT: Obstructing 7 mm stone in the left UPJ  · 5/3 urine culture grew E  coli  · 5/4 UA results reviewed, culture results pending  · Urology consulted and following, S/P bilateral cystoscopy with stent placement on 5/4  · Continue Flomax  · Continue pain control, added Toradol today  · K-pad to abdomen  · Continue IV Rocephin

## 2023-05-05 NOTE — PLAN OF CARE
Problem: INFECTION - ADULT  Goal: Absence or prevention of progression during hospitalization  Description: INTERVENTIONS:  - Assess and monitor for signs and symptoms of infection  - Monitor lab/diagnostic results  - Monitor all insertion sites, i e  indwelling lines, tubes, and drains  - Monitor endotracheal if appropriate and nasal secretions for changes in amount and color  - [employer] appropriate cooling/warming therapies per order  - Administer medications as ordered  - Instruct and encourage patient and family to use good hand hygiene technique  - Identify and instruct in appropriate isolation precautions for identified infection/condition  Outcome: Progressing     Problem: SAFETY ADULT  Goal: Patient will remain free of falls  Description: INTERVENTIONS:  - Educate patient/family on patient safety including physical limitations  - Instruct patient to call for assistance with activity   - Consult OT/PT to assist with strengthening/mobility   - Keep Call bell within reach  - Keep bed low and locked with side rails adjusted as appropriate  - Keep care items and personal belongings within reach  - Initiate and maintain comfort rounds  - Make Fall Risk Sign visible to staff  - Offer Toileting every 2 Hours, in advance of need  - Initiate/Maintain bed alarm  - Obtain necessary fall risk management equipment: socks/alarms  - Apply yellow socks and bracelet for high fall risk patients  - Consider moving patient to room near nurses station  Outcome: Progressing

## 2023-05-05 NOTE — PROGRESS NOTES
"Progress Note - Alpesh Pitch 49 y o  female MRN: [id number]    Unit/Bed#: -01 Encounter: [id number]      Assessment:  Feeling better overall status post bilateral ureteral stent insertion  Flank pain has resolved, but she does have lower abdominal discomfort, especially with voiding  She also has some mildly increased urinary urgency secondary to the stent  Admission urine culture is growing E  coli  KUB x-ray this morning shows stents in good position with probable faint almost 1 cm calcification in the proximal left ureter with no obviously visible right urinary tract calcification  Plan:  Continue ceftriaxone pending culture and sensitivity results  Continue oxybutynin and tamsulosin for stent related symptoms  Pain medication may be given as necessary  Check bladder scan for postvoid residual   She will follow-up with her usual urologist at [address] [place] [address] for urology as outpatient for management of the stones  Subjective:   Feels better overall with no further flank pain  However, she does have stent related symptoms of lower abdominal discomfort especially with voiding as well as urgency  Objective:     Vitals: Blood pressure 107/61, pulse 62, temperature 98 8 °F (37 1 °C), resp  rate 16, height 5' 3\" (1 6 m), weight 63 5 kg (139 lb 15 9 oz), SpO2 92 %, not currently breastfeeding  ,Body mass index is 24 8 kg/m²        Intake/Output Summary (Last 24 hours) at 5/5/2023 1224  Last data filed at 5/5/2023 1152  Gross per 24 hour   Intake 1441 67 ml   Output 700 ml   Net 741 67 ml       Physical Exam: No abdominal or flank tenderness    Invasive Devices     Peripheral Intravenous Line  Duration           Peripheral IV 05/03/23 Right Antecubital 1 day    Peripheral IV 05/05/23 Left;Ventral (anterior) Forearm <1 day          Drain  Duration           Ureteral Internal Stent Left ureter 6 Fr  <1 day    Ureteral Internal Stent Right ureter 6 Fr  <1 day                Lab, Imaging and other " studies: I have personally reviewed pertinent reports      VTE Pharmacologic Prophylaxis: Sequential compression device (Venodyne)   VTE Mechanical Prophylaxis: sequential compression device

## 2023-05-06 LAB
BACTERIA UR CULT: ABNORMAL

## 2023-05-06 RX ORDER — KETOROLAC TROMETHAMINE 30 MG/ML
15 INJECTION, SOLUTION INTRAMUSCULAR; INTRAVENOUS EVERY 6 HOURS SCHEDULED
Status: COMPLETED | OUTPATIENT
Start: 2023-05-06 | End: 2023-05-07

## 2023-05-06 RX ADMIN — OXYCODONE HYDROCHLORIDE 5 MG: 5 TABLET ORAL at 17:22

## 2023-05-06 RX ADMIN — KETOROLAC TROMETHAMINE 15 MG: 30 INJECTION, SOLUTION INTRAMUSCULAR at 13:20

## 2023-05-06 RX ADMIN — SODIUM CHLORIDE, SODIUM LACTATE, POTASSIUM CHLORIDE, AND CALCIUM CHLORIDE 100 ML/HR: .6; .31; .03; .02 INJECTION, SOLUTION INTRAVENOUS at 17:17

## 2023-05-06 RX ADMIN — SODIUM CHLORIDE, SODIUM LACTATE, POTASSIUM CHLORIDE, AND CALCIUM CHLORIDE 100 ML/HR: .6; .31; .03; .02 INJECTION, SOLUTION INTRAVENOUS at 06:50

## 2023-05-06 RX ADMIN — OXYCODONE HYDROCHLORIDE 5 MG: 5 TABLET ORAL at 08:03

## 2023-05-06 RX ADMIN — SENNOSIDES AND DOCUSATE SODIUM 1 TABLET: 50; 8.6 TABLET ORAL at 17:17

## 2023-05-06 RX ADMIN — OXYBUTYNIN CHLORIDE 5 MG: 5 TABLET ORAL at 17:17

## 2023-05-06 RX ADMIN — HYDROMORPHONE HYDROCHLORIDE 2 MG: 2 INJECTION, SOLUTION INTRAMUSCULAR; INTRAVENOUS; SUBCUTANEOUS at 20:03

## 2023-05-06 RX ADMIN — KETOROLAC TROMETHAMINE 15 MG: 30 INJECTION, SOLUTION INTRAMUSCULAR at 23:03

## 2023-05-06 RX ADMIN — SENNOSIDES AND DOCUSATE SODIUM 1 TABLET: 50; 8.6 TABLET ORAL at 08:03

## 2023-05-06 RX ADMIN — ONDANSETRON 4 MG: 2 INJECTION INTRAMUSCULAR; INTRAVENOUS at 06:50

## 2023-05-06 RX ADMIN — KETOROLAC TROMETHAMINE 15 MG: 30 INJECTION, SOLUTION INTRAMUSCULAR at 17:17

## 2023-05-06 RX ADMIN — FAMOTIDINE 20 MG: 10 INJECTION INTRAVENOUS at 21:36

## 2023-05-06 RX ADMIN — PANTOPRAZOLE SODIUM 40 MG: 40 INJECTION, POWDER, FOR SOLUTION INTRAVENOUS at 08:03

## 2023-05-06 RX ADMIN — CEFTRIAXONE 1000 MG: 1 INJECTION, SOLUTION INTRAVENOUS at 03:11

## 2023-05-06 RX ADMIN — TAMSULOSIN HYDROCHLORIDE 0.4 MG: 0.4 CAPSULE ORAL at 17:17

## 2023-05-06 RX ADMIN — OXYBUTYNIN CHLORIDE 5 MG: 5 TABLET ORAL at 08:03

## 2023-05-06 RX ADMIN — OXYBUTYNIN CHLORIDE 5 MG: 5 TABLET ORAL at 21:36

## 2023-05-06 RX ADMIN — HYDROMORPHONE HYDROCHLORIDE 2 MG: 2 INJECTION, SOLUTION INTRAMUSCULAR; INTRAVENOUS; SUBCUTANEOUS at 06:50

## 2023-05-06 NOTE — PROGRESS NOTES
[facility]  Progress Note  Name: Joseline Wiggins  MRN: [id number]  Unit/Bed#: -01 I Date of Admission: 5/3/2023   Date of Service: 5/6/2023 I Hospital Day: 1    Assessment/Plan   Gastroesophageal reflux disease  Assessment & Plan  · Continue PPI and pepcid    * Obstruction of left ureteropelvic junction (UPJ) due to stone  Assessment & Plan  · Patient presented with left flank pain radiating to groin and vomiting, has hx of nephrolitiasis  · 5/3 CT: Obstructing 7 mm stone in the left UPJ  · 5/3 urine culture grew E  coli  · 5/4 UA results reviewed, culture results pending  · Urology consulted and following, S/P bilateral cystoscopy with stent placement on 5/4  · Continue Flomax  · Continue pain control, added Toradol today  · K-pad to abdomen  · Continue IV Rocephin     Hypokalemia-resolved as of 5/5/2023  Assessment & Plan  · Mild at 3 3 likely in setting of GI loss  · Resolved              VTE Pharmacologic Prophylaxis:   Pharmacologic: None  Low risk, ambulating  Mechanical VTE Prophylaxis in Place: Yes    Patient Centered Rounds: I have performed bedside rounds with nursing staff today  Discussions with Specialists or Other Care Team Provider: Urology, nursing, case management    Education and Discussions with Family / Patient: Treatment plan discussed with patient understands the plan as has been explained and is agreeable to plan as stated  All questions answered satisfaction  Time Spent for Care: 35 minutes  More than 50% of total time spent on counseling and coordination of care as described above  Current Length of Stay: 1 day(s)    Current Patient Status: Inpatient   Certification Statement: The patient will continue to require additional inpatient hospital stay due to Need for IV antibiotics, pending urine culture results from 5/4, requiring IV pain management    Discharge Plan: Patient is status post bilateral ureteral stent placement on 5/4    Requiring IV Dilaudid as needed and reports not controlling pain  Added scheduled Toradol  Plan will be to discharge patient home when pain controlled, hopefully in the next 24-48 hours  Code Status: Level 1 - Full Code      Subjective:   Patient states she is still having pain in her lower abdomen, worse with voiding  Objective:     Vitals:   Temp (24hrs), Av 1 °F (37 3 °C), Min:98 7 °F (37 1 °C), Max:99 3 °F (37 4 °C)    Temp:  [98 7 °F (37 1 °C)-99 3 °F (37 4 °C)] 99 3 °F (37 4 °C)  HR:  [61-85] 77  Resp:  [16-17] 17  BP: (125-133)/(69-74) 133/74  SpO2:  [92 %-96 %] 96 %  Body mass index is 24 8 kg/m²  Input and Output Summary (last 24 hours): Intake/Output Summary (Last 24 hours) at 2023 1324  Last data filed at 2023 0941  Gross per 24 hour   Intake 3521 66 ml   Output 950 ml   Net 2571 66 ml       Physical Exam:     Physical Exam  Constitutional:       General: She is not in acute distress  Appearance: Normal appearance  She is not ill-appearing  HENT:      Head: Normocephalic and atraumatic  Nose: Nose normal       Mouth/Throat:      Mouth: Mucous membranes are moist    Cardiovascular:      Rate and Rhythm: Normal rate and regular rhythm  Pulses: Normal pulses  Heart sounds: Normal heart sounds  Pulmonary:      Effort: Pulmonary effort is normal  No respiratory distress  Breath sounds: Normal breath sounds  No wheezing or rales  Abdominal:      General: Bowel sounds are normal  There is no distension  Palpations: Abdomen is soft  Tenderness: There is abdominal tenderness  There is no guarding  Comments: Some lower abdominal tenderness on palpation abdomen; patient has K-pad on her abdomen and states it is helping   Musculoskeletal:         General: Normal range of motion  Skin:     General: Skin is warm and dry  Capillary Refill: Capillary refill takes less than 2 seconds  Neurological:      General: No focal deficit present        Mental Status: She is alert and oriented to person, place, and time  Mental status is at baseline  Psychiatric:         Mood and Affect: Mood normal          Behavior: Behavior normal          Thought Content: Thought content normal          Judgment: Judgment normal          Additional Data:     Labs:    Results from last 7 days   Lab Units 05/04/23  0520 05/03/23  1739   WBC Thousand/uL 5 28 6 70   HEMOGLOBIN g/dL 14 7 16 9*   HEMATOCRIT % 44 7 49 9*   PLATELETS Thousands/uL 120* 208   NEUTROS PCT %  --  62   LYMPHS PCT %  --  26   MONOS PCT %  --  6   EOS PCT %  --  5     Results from last 7 days   Lab Units 05/05/23  0421   POTASSIUM mmol/L 4 0   CHLORIDE mmol/L 106   CO2 mmol/L 23   BUN mg/dL 24   CREATININE mg/dL 1 06   CALCIUM mg/dL 8 4           * I Have Reviewed All Lab Data Listed Above  * Additional Pertinent Lab Tests Reviewed:  [facility] Admission Reviewed    Imaging:    Imaging Reports Reviewed Today Include: CT renal stone protocol, KUB  Imaging Personally Reviewed by Myself Includes: Same as above    Recent Cultures (last 7 days):     Results from last 7 days   Lab Units 05/04/23  1755 05/04/23  1746 05/04/23  1740 05/03/23  2313   URINE CULTURE  Culture too young- will reincubate Culture too young- will reincubate Culture too young- will reincubate >100,000 cfu/ml Escherichia coli*       Last 24 Hours Medication List:   Current Facility-Administered Medications   Medication Dose Route Frequency Provider Last Rate    acetaminophen  650 mg Oral Q4H PRN Amos Salgado PA-C      cefTRIAXone  1,000 mg Intravenous Q24H Amos Salgado PA-C 1,000 mg (05/06/23 0311)    diazepam  5 mg Intravenous Q8H PRN Amos Salgado PA-C      famotidine  20 mg Intravenous HS Amos Salgado PA-C      HYDROmorphone  2 mg Intravenous Q4H PRN Sharron Roberts PA-C      ketorolac  15 mg Intravenous Q6H [address] ABNRE Rapp      lactated ringers  100 mL/hr Intravenous Continuous Oralee ABNER Valdovinos 100 mL/hr (05/06/23 0650)    ondansetron  4 mg Intravenous Q6H PRN Merdis PierreAURELIO      oxybutynin  5 mg Oral TID Sharron Roberts PA-C      oxyCODONE  5 mg Oral Q4H PRN Merdis Pierre, AURELIO      pantoprazole  40 mg Intravenous Q24H [facility], [place]      senna-docusate sodium  1 tablet Oral BID Flo Barney PA-C      tamsulosin  0 4 mg Oral Daily With Dinner Merdis Pierre, [place]          Today, Patient Was Seen By: ABNER Gonzalez    ** Please Note: Dictation voice to text software may have been used in the creation of this document   **

## 2023-05-07 VITALS
BODY MASS INDEX: 24.8 KG/M2 | SYSTOLIC BLOOD PRESSURE: 137 MMHG | HEIGHT: 63 IN | TEMPERATURE: 98.4 F | DIASTOLIC BLOOD PRESSURE: 87 MMHG | HEART RATE: 56 BPM | OXYGEN SATURATION: 94 % | RESPIRATION RATE: 20 BRPM | WEIGHT: 139.99 LBS

## 2023-05-07 LAB
ANION GAP SERPL CALCULATED.3IONS-SCNC: 5 MMOL/L (ref 4–13)
BASOPHILS # BLD AUTO: 0.03 THOUSANDS/ÂΜL (ref 0–0.1)
BASOPHILS NFR BLD AUTO: 1 % (ref 0–1)
BUN SERPL-MCNC: 21 MG/DL (ref 5–25)
CALCIUM SERPL-MCNC: 8.5 MG/DL (ref 8.4–10.2)
CHLORIDE SERPL-SCNC: 106 MMOL/L (ref 96–108)
CO2 SERPL-SCNC: 30 MMOL/L (ref 21–32)
CREAT SERPL-MCNC: 0.54 MG/DL (ref 0.6–1.3)
EOSINOPHIL # BLD AUTO: 0.18 THOUSAND/ÂΜL (ref 0–0.61)
EOSINOPHIL NFR BLD AUTO: 3 % (ref 0–6)
ERYTHROCYTE [DISTWIDTH] IN BLOOD BY AUTOMATED COUNT: 13.3 % (ref 11.6–15.1)
GFR SERPL CREATININE-BSD FRML MDRD: 111 ML/MIN/1.73SQ M
GLUCOSE SERPL-MCNC: 115 MG/DL (ref 65–140)
HCT VFR BLD AUTO: 39.3 % (ref 34.8–46.1)
HGB BLD-MCNC: 12.8 G/DL (ref 11.5–15.4)
IMM GRANULOCYTES # BLD AUTO: 0.04 THOUSAND/UL (ref 0–0.2)
IMM GRANULOCYTES NFR BLD AUTO: 1 % (ref 0–2)
LYMPHOCYTES # BLD AUTO: 0.68 THOUSANDS/ÂΜL (ref 0.6–4.47)
LYMPHOCYTES NFR BLD AUTO: 13 % (ref 14–44)
MCH RBC QN AUTO: 29.8 PG (ref 26.8–34.3)
MCHC RBC AUTO-ENTMCNC: 32.6 G/DL (ref 31.4–37.4)
MCV RBC AUTO: 92 FL (ref 82–98)
MONOCYTES # BLD AUTO: 0.41 THOUSAND/ÂΜL (ref 0.17–1.22)
MONOCYTES NFR BLD AUTO: 8 % (ref 4–12)
NEUTROPHILS # BLD AUTO: 4.03 THOUSANDS/ÂΜL (ref 1.85–7.62)
NEUTS SEG NFR BLD AUTO: 74 % (ref 43–75)
NRBC BLD AUTO-RTO: 0 /100 WBCS
PLATELET # BLD AUTO: 102 THOUSANDS/UL (ref 149–390)
PMV BLD AUTO: 11.2 FL (ref 8.9–12.7)
POTASSIUM SERPL-SCNC: 3.9 MMOL/L (ref 3.5–5.3)
RBC # BLD AUTO: 4.29 MILLION/UL (ref 3.81–5.12)
SODIUM SERPL-SCNC: 141 MMOL/L (ref 135–147)
WBC # BLD AUTO: 5.37 THOUSAND/UL (ref 4.31–10.16)

## 2023-05-07 RX ORDER — OXYBUTYNIN CHLORIDE 5 MG/1
5 TABLET ORAL 3 TIMES DAILY
Qty: 90 TABLET | Refills: 0 | Status: SHIPPED | OUTPATIENT
Start: 2023-05-07 | End: 2023-05-11

## 2023-05-07 RX ORDER — CIPROFLOXACIN 500 MG/1
500 TABLET, FILM COATED ORAL EVERY 12 HOURS SCHEDULED
Qty: 14 TABLET | Refills: 0 | Status: SHIPPED | OUTPATIENT
Start: 2023-05-07 | End: 2023-05-14

## 2023-05-07 RX ORDER — TAMSULOSIN HYDROCHLORIDE 0.4 MG/1
0.4 CAPSULE ORAL
Qty: 30 CAPSULE | Refills: 0 | Status: SHIPPED | OUTPATIENT
Start: 2023-05-07 | End: 2023-06-06

## 2023-05-07 RX ORDER — CIPROFLOXACIN 500 MG/1
500 TABLET, FILM COATED ORAL EVERY 12 HOURS SCHEDULED
Status: DISCONTINUED | OUTPATIENT
Start: 2023-05-07 | End: 2023-05-07 | Stop reason: HOSPADM

## 2023-05-07 RX ORDER — OXYCODONE HYDROCHLORIDE 5 MG/1
5 TABLET ORAL EVERY 4 HOURS PRN
Qty: 5 TABLET | Refills: 0 | Status: SHIPPED | OUTPATIENT
Start: 2023-05-07 | End: 2023-05-11

## 2023-05-07 RX ADMIN — ONDANSETRON 4 MG: 2 INJECTION INTRAMUSCULAR; INTRAVENOUS at 08:49

## 2023-05-07 RX ADMIN — OXYBUTYNIN CHLORIDE 5 MG: 5 TABLET ORAL at 08:40

## 2023-05-07 RX ADMIN — KETOROLAC TROMETHAMINE 15 MG: 30 INJECTION, SOLUTION INTRAMUSCULAR at 05:32

## 2023-05-07 RX ADMIN — CEFTRIAXONE 1000 MG: 1 INJECTION, SOLUTION INTRAVENOUS at 03:30

## 2023-05-07 RX ADMIN — SENNOSIDES AND DOCUSATE SODIUM 1 TABLET: 50; 8.6 TABLET ORAL at 08:40

## 2023-05-07 RX ADMIN — OXYCODONE HYDROCHLORIDE 5 MG: 5 TABLET ORAL at 08:41

## 2023-05-07 RX ADMIN — CIPROFLOXACIN 500 MG: 500 TABLET, FILM COATED ORAL at 10:05

## 2023-05-07 RX ADMIN — SODIUM CHLORIDE, SODIUM LACTATE, POTASSIUM CHLORIDE, AND CALCIUM CHLORIDE 100 ML/HR: .6; .31; .03; .02 INJECTION, SOLUTION INTRAVENOUS at 03:32

## 2023-05-07 RX ADMIN — PANTOPRAZOLE SODIUM 40 MG: 40 INJECTION, POWDER, FOR SOLUTION INTRAVENOUS at 08:41

## 2023-05-07 NOTE — NURSING NOTE
Pt discharged home, avs read and explained to pt, all questions answered  IV removed without complications and tip intact  All belongings with pt  Pt escorted to front entrance and assisted into car of

## 2023-05-07 NOTE — DISCHARGE INSTR - AVS FIRST PAGE
Please follow-up with your primary care physician within a week of discharge  Your urine culture results from 5/4 are back  You had E  coli bacteria in your urine  I have reviewed the sensitivity and the best antibiotic choice is ciprofloxacin  It is being prescribed on discharge  Please take the entire course of antibiotic as directed  Please keep yourself well-hydrated  New medications include oxybutynin and Flomax were initiated by urology  These are to help you pass your water  I have ordered a 30-day supply  Please discuss with your urologist if these medications should be ongoing when you have your follow-up appointment  Please follow-up with your outpatient urologists at [facility] for urology within a week of discharge  Please call to set up an appointment

## 2023-05-07 NOTE — PLAN OF CARE
Problem: PAIN - ADULT  Goal: Verbalizes/displays adequate comfort level or baseline comfort level  Description: Interventions:  - Encourage patient to monitor pain and request assistance  - Assess pain using appropriate pain scale  - Administer analgesics based on type and severity of pain and evaluate response  - Implement non-pharmacological measures as appropriate and evaluate response  - Consider cultural and social influences on pain and pain management  - Notify physician/advanced practitioner if interventions unsuccessful or patient reports new pain  Outcome: Progressing     Problem: INFECTION - ADULT  Goal: Absence or prevention of progression during hospitalization  Description: INTERVENTIONS:  - Assess and monitor for signs and symptoms of infection  - Monitor lab/diagnostic results  - Monitor all insertion sites, i e  indwelling lines, tubes, and drains  - Monitor endotracheal if appropriate and nasal secretions for changes in amount and color  - [place] appropriate cooling/warming therapies per order  - Administer medications as ordered  - Instruct and encourage patient and family to use good hand hygiene technique  - Identify and instruct in appropriate isolation precautions for identified infection/condition  Outcome: Progressing  Goal: Absence of fever/infection during neutropenic period  Description: INTERVENTIONS:  - Monitor WBC    Outcome: Progressing     Problem: SAFETY ADULT  Goal: Patient will remain free of falls  Description: INTERVENTIONS:  - Educate patient/family on patient safety including physical limitations  - Instruct patient to call for assistance with activity   - Consult OT/PT to assist with strengthening/mobility   - Keep Call bell within reach  - Keep bed low and locked with side rails adjusted as appropriate  - Keep care items and personal belongings within reach  - Initiate and maintain comfort rounds  - Make Fall Risk Sign visible to staff  - Offer Toileting in advance of need  - Initiate/Maintain bed alarm  - Obtain necessary fall risk management equipment:   - Apply yellow socks and bracelet for high fall risk patients  - Consider moving patient to room near nurses station  Outcome: Progressing  Goal: Maintain or return to baseline ADL function  Description: INTERVENTIONS:  -  Assess patient's ability to carry out ADLs; assess patient's baseline for ADL function and identify physical deficits which impact ability to perform ADLs (bathing, care of mouth/teeth, toileting, grooming, dressing, etc )  - Assess/evaluate cause of self-care deficits   - Assess range of motion  - Assess patient's mobility; develop plan if impaired  - Assess patient's need for assistive devices and provide as appropriate  - Encourage maximum independence but intervene and supervise when necessary  - Involve family in performance of ADLs  - Assess for home care needs following discharge   - Consider OT consult to assist with ADL evaluation and planning for discharge  - Provide patient education as appropriate  Outcome: Progressing  Goal: Maintains/Returns to pre admission functional level  Description: INTERVENTIONS:  - Perform BMAT or MOVE assessment daily    - Set and communicate daily mobility goal to care team and patient/family/caregiver     - Collaborate with rehabilitation services on mobility goals if consulted  - Out of bed for toileting  - Record patient progress and toleration of activity level   Outcome: Progressing     Problem: DISCHARGE PLANNING  Goal: Discharge to home or other facility with appropriate resources  Description: INTERVENTIONS:  - Identify barriers to discharge w/patient and caregiver  - Arrange for needed discharge resources and transportation as appropriate  - Identify discharge learning needs (meds, wound care, etc )  - Arrange for interpretive services to assist at discharge as needed  - Refer to Case Management Department for coordinating discharge planning if the patient needs post-hospital services based on physician/advanced practitioner order or complex needs related to functional status, cognitive ability, or social support system  Outcome: Progressing     Problem: Knowledge Deficit  Goal: Patient/family/caregiver demonstrates understanding of disease process, treatment plan, medications, and discharge instructions  Description: Complete learning assessment and assess knowledge base  Interventions:  - Provide teaching at level of understanding  - Provide teaching via preferred learning methods  Outcome: Progressing     Problem: Nutrition/Hydration-ADULT  Goal: Nutrient/Hydration intake appropriate for improving, restoring or maintaining nutritional needs  Description: Monitor and assess patient's nutrition/hydration status for malnutrition  Collaborate with interdisciplinary team and initiate plan and interventions as ordered  Monitor patient's weight and dietary intake as ordered or per policy  Utilize nutrition screening tool and intervene as necessary  Determine patient's food preferences and provide high-protein, high-caloric foods as appropriate       INTERVENTIONS:  - Monitor oral intake, urinary output, labs, and treatment plans  - Assess nutrition and hydration status and recommend course of action  - Evaluate amount of meals eaten  - Assist patient with eating if necessary   - Allow adequate time for meals  - Recommend/ encourage appropriate diets, oral nutritional supplements, and vitamin/mineral supplements  - Order, calculate, and assess calorie counts as needed  - Recommend, monitor, and adjust tube feedings and TPN/PPN based on assessed needs  - Assess need for intravenous fluids  - Provide specific nutrition/hydration education as appropriate  - Include patient/family/caregiver in decisions related to nutrition  Outcome: Progressing     Problem: MOBILITY - ADULT  Goal: Maintain or return to baseline ADL function  Description: INTERVENTIONS:  -  Assess patient's ability to carry out ADLs; assess patient's baseline for ADL function and identify physical deficits which impact ability to perform ADLs (bathing, care of mouth/teeth, toileting, grooming, dressing, etc )  - Assess/evaluate cause of self-care deficits   - Assess range of motion  - Assess patient's mobility; develop plan if impaired  - Assess patient's need for assistive devices and provide as appropriate  - Encourage maximum independence but intervene and supervise when necessary  - Involve family in performance of ADLs  - Assess for home care needs following discharge   - Consider OT consult to assist with ADL evaluation and planning for discharge  - Provide patient education as appropriate  Outcome: Progressing  Goal: Maintains/Returns to pre admission functional level  Description: INTERVENTIONS:  - Perform BMAT or MOVE assessment daily    - Set and communicate daily mobility goal to care team and patient/family/caregiver     - Collaborate with rehabilitation services on mobility goals if consulted  - Out of bed for toileting  - Record patient progress and toleration of activity level   Outcome: Progressing

## 2023-05-07 NOTE — DISCHARGE SUMMARY
[facility] [address]  Discharge- Patel Yao 1975, 49 y o  female MRN: [id number]  Unit/Bed#: -01 Encounter: [id number]  Primary Care Provider: Judy Cleaning DO   Date and time admitted to hospital: 5/3/2023  5:23 PM    Gastroesophageal reflux disease  Assessment & Plan  · Continue home regimen of Prilosec and Pepcid on discharge    * Obstruction of left ureteropelvic junction (UPJ) due to stone  Assessment & Plan  · Patient presented with left flank pain radiating to groin and vomiting, has hx of nephrolitiasis  · Patient reports pain improved today, anxious to go home  · 5/3 CT: Obstructing 7 mm stone in the left UPJ  · 5/3 urine culture grew E  coli  · 5/4 UA results reviewed positive for E  coli  Sensitive to Cipro, will discharge on Cipro x1 week today  · Urology consulted and following, S/P bilateral cystoscopy with stent placement on 5/4  · Continue Flomax and oxybutynin on discharge  · IV Rocephin transitioned to Cipro today for discharge  · Discussed with Dr Yang Xavier  Patient will follow-up with [facility] for urology on discharge  She sees them in the outpatient setting  Discharging Physician / Practitioner: Fouzia Reaves  PCP: Judy Cleaning DO  Admission Date:   Admission Orders (From admission, onward)     Ordered        05/05/23 1010  Inpatient Admission  Once            05/03/23 1958  Place in Observation  Once                      Discharge Date: 05/07/23    Medical Problems     Resolved Problems  Date Reviewed: 5/7/2023          Resolved    Hypokalemia 5/5/2023     Resolved by  Yolie Romeo PA-C          Consultations During Hospital Stay:  · Urology    Procedures Performed:   · 5/4 bilateral cystoscopy retrograde pyelogram with insertion stents    Significant Findings / Test Results:   · 5/3 CT renal study stone protocol: Obstructing 7 mm stone in the UPJ on the left  Bilateral nonobstructing renal collecting system calculi    In addition there "was a nonobstructing right ureteral stone measuring 3 x 4 which is apparently asymptomatic  · 5/4 KUB: 7 mm calculus in left hemiabdomen corresponding to the proximal left ureteral calculus seen on CT 5/3  The other intrarenal and ureteral calculi are not apparent  · 5/4 urine positive E  coli     Incidental Findings:   · None    Test Results Pending at Discharge (will require follow up): · None     Outpatient Tests Requested:  · To be determined by your primary care physician    Complications: None    Reason for Admission: Obstruction of left ureteropelvic junction due to stone    Hospital Course:     Yemi Hall is a 49 y o  female patient who originally presented to the hospital on 5/3/2023 due to left flank pain  Of note, she has had prior admissions for nephrolithiasis and is a known patient of [facility] urology  CT scan renal stone protocol showed obstructing 7 mm stone in the left UPJ  Bilateral nonobstructing renal collecting system calculi  Additionally there was a nonobstructing right ureteral stone  Urology was consulted  5/4 patient had bilateral cystoscopy retrograde pyelogram with insertion of stents  5/4 urine culture grew E  coli  She was treated with IV Rocephin since arrival, which was transitioned to Cipro today after receiving results of culture and sensitivity  Discussed with Dr Crys Irwin, patient will follow-up with [facility] urology on discharge, as she is well-known to them  She will be discharged today on ciprofloxacin x1 week, and will continue oxybutynin and Flomax that was initiated by urology  She will follow-up with her primary care physician as well within a week of discharge  Please see above list of diagnoses and related plan for additional information  Condition at Discharge: good     Discharge Day Visit / Exam:     Subjective: \"I am feeling better  I am ready to go home  \"  Vitals: Blood Pressure: 137/87 (05/07/23 0727)  Pulse: 56 (05/07/23 " "0727)  Temperature: 98 4 °F (36 9 °C) (05/07/23 0727)  Temp Source: Temporal (05/04/23 1641)  Respirations: 20 (05/07/23 0727)  Height: 5' 3\" (160 cm) (05/04/23 1641)  Weight - Scale: 63 5 kg (139 lb 15 9 oz) (05/04/23 1641)  SpO2: 94 % (05/07/23 0727)  Exam:   Physical Exam  Vitals and nursing note reviewed  Constitutional:       General: She is not in acute distress  Appearance: She is well-developed  HENT:      Head: Normocephalic and atraumatic  Mouth/Throat:      Mouth: Mucous membranes are moist    Cardiovascular:      Rate and Rhythm: Normal rate and regular rhythm  Heart sounds: No murmur heard  Pulmonary:      Effort: Pulmonary effort is normal  No respiratory distress  Breath sounds: Normal breath sounds  No wheezing or rales  Abdominal:      General: Bowel sounds are normal  There is no distension  Palpations: Abdomen is soft  Tenderness: There is no abdominal tenderness  There is no right CVA tenderness, left CVA tenderness or guarding  Musculoskeletal:         General: No swelling  Normal range of motion  Cervical back: Normal range of motion and neck supple  Skin:     General: Skin is warm and dry  Capillary Refill: Capillary refill takes less than 2 seconds  Neurological:      General: No focal deficit present  Mental Status: She is alert and oriented to person, place, and time  Mental status is at baseline  Psychiatric:         Mood and Affect: Mood normal          Behavior: Behavior normal          Thought Content: Thought content normal          Judgment: Judgment normal          Discussion with Family: Rest of hospitalization including testing and treatments discussed with patient  All questions answered her satisfaction  She is aware she is to follow-up with her primary care physician within a week of discharge and to contact [facility] for urology for follow-up appointment within a week of discharge       Discharge " instructions/Information to patient and family:   See after visit summary for information provided to patient and family  Provisions for Follow-Up Care:  See after visit summary for information related to follow-up care and any pertinent home health orders  Disposition:     Home    For Discharges to [facility] SNF:   · Not Applicable to this Patient - Not Applicable to this Patient    Planned Readmission: No     Discharge Statement:  I spent 38 minutes discharging the patient  This time was spent on the day of discharge  I had direct contact with the patient on the day of discharge  Greater than 50% of the total time was spent examining patient, answering all patient questions, arranging and discussing plan of care with patient as well as directly providing post-discharge instructions  Additional time then spent on discharge activities  Discharge Medications:  See after visit summary for reconciled discharge medications provided to patient and family        ** Please Note: This note has been constructed using a voice recognition system **

## 2023-05-07 NOTE — ASSESSMENT & PLAN NOTE
· Patient presented with left flank pain radiating to groin and vomiting, has hx of nephrolitiasis  · Patient reports pain improved today, anxious to go home  · 5/3 CT: Obstructing 7 mm stone in the left UPJ  · 5/3 urine culture grew E  coli  · 5/4 UA results reviewed positive for E  coli  Sensitive to Cipro, will discharge on Cipro x1 week today  · Urology consulted and following, S/P bilateral cystoscopy with stent placement on 5/4  · Continue Flomax and oxybutynin on discharge  · IV Rocephin transitioned to Cipro today for discharge  · Discussed with Dr Bernadette Joy  Patient will follow-up with [facility] for urology on discharge  She sees them in the outpatient setting

## 2023-05-08 ENCOUNTER — TELEPHONE (OUTPATIENT)
Dept: UROLOGY | Facility: AMBULATORY SURGERY CENTER | Age: 48
End: 2023-05-08

## 2023-05-08 DIAGNOSIS — N20.0 NEPHROLITHIASIS: Primary | ICD-10-CM

## 2023-05-08 RX ORDER — PHENAZOPYRIDINE HYDROCHLORIDE 200 MG/1
200 TABLET, FILM COATED ORAL
Qty: 10 TABLET | Refills: 0 | Status: SHIPPED | OUTPATIENT
Start: 2023-05-08

## 2023-05-08 NOTE — TELEPHONE ENCOUNTER
Patient likely experiencing stent colic  Agree with continuing flomax, oxybutynin, pyridium, otc NSAIDs, and warm compress as needed  Please call to schedule office follow up in the next 1-2 weeks, to arrange staged ureteroscopy

## 2023-05-08 NOTE — UTILIZATION REVIEW
NOTIFICATION OF ADMISSION DISCHARGE   This is a Notification of Discharge from [address]  Please be advised that this patient has been discharge from our facility  Below you will find the admission and discharge date and time including the patient’s disposition  UTILIZATION REVIEW CONTACT:  Sedrick Arango  Utilization   Network Utilization Review Department  Phone: [phone] carefully listen to the prompts  All voicemails are confidential   Email: [email or web address]     ADMISSION INFORMATION  PRESENTATION DATE: 5/3/2023  5:23 PM  OBERVATION ADMISSION DATE:   INPATIENT ADMISSION DATE: 5/5/23 10:10 AM   DISCHARGE DATE: 5/7/2023 12:31 PM   DISPOSITION:Home/Self Care    IMPORTANT INFORMATION:  Send all requests for admission clinical reviews, approved or denied determinations and any other requests to dedicated fax number below belonging to the campus where the patient is receiving treatment   List of dedicated fax numbers:  [address] DENIALS (Administrative/Medical Necessity) [phone]   [address] (Maternity/NICU/Pediatrics) [phone]   [facility] [phone]   [facility] [phone]   [address] [phone]   [address] [facility] [phone]   90 [facility] [phone]   [address] [place] [address] 119 [phone]   [facility]  [phone]   [address] [facility] [phone]   [address] [facility] [address] [facility] [phone]

## 2023-05-08 NOTE — TELEPHONE ENCOUNTER
Called and spoke with pt  Reports pain is at a level 8  Reports taking the pain medication that as prescribed  Reports having frequency and urgency  Reports blood in urine each time she urinates  Pt having nausea and vomiting  Reports severe pain last night  Pt is looking to have appt scheduled to determine next steps  PT is in a great deal of discomfort and states having to call out of work  Wants something done ASAP  Advised will reach out to provider to determine next steps and contact her back

## 2023-05-08 NOTE — TELEPHONE ENCOUNTER
Patient was seen in the ER and admitted for kidney stones  Discharged on Sunday with Stones  Still has severe pain  Pain medications not working  Called requesting appointment with Dr Pierre Medicine  IMPRESSION:  There is an obstructing 7 mm stone in the left UPJ  Urology consultation advised      Bilateral nonobstructing renal collecting system calculi  In addition there is a nonobstructing right ureter stone measuring 3 x 4 mm which is apparently asymptomatic      Patient may be reached at [phone]

## 2023-05-09 ENCOUNTER — TRANSITIONAL CARE MANAGEMENT (OUTPATIENT)
Dept: INTERNAL MEDICINE CLINIC | Facility: OTHER | Age: 48
End: 2023-05-09

## 2023-05-09 NOTE — TELEPHONE ENCOUNTER
Claudine Acuna's instructions with pt  Pt verbalized understanding    Pt scheduled with Fredy Phillips on 5/11

## 2023-05-11 ENCOUNTER — OFFICE VISIT (OUTPATIENT)
Dept: UROLOGY | Facility: MEDICAL CENTER | Age: 48
End: 2023-05-11

## 2023-05-11 VITALS
WEIGHT: 139 LBS | SYSTOLIC BLOOD PRESSURE: 140 MMHG | TEMPERATURE: 98.4 F | BODY MASS INDEX: 24.63 KG/M2 | HEIGHT: 63 IN | OXYGEN SATURATION: 97 % | HEART RATE: 113 BPM | DIASTOLIC BLOOD PRESSURE: 80 MMHG

## 2023-05-11 DIAGNOSIS — N20.0 NEPHROLITHIASIS: Primary | ICD-10-CM

## 2023-05-11 RX ORDER — ONDANSETRON 4 MG/1
4 TABLET, ORALLY DISINTEGRATING ORAL EVERY 6 HOURS PRN
Qty: 20 TABLET | Refills: 1 | Status: SHIPPED | OUTPATIENT
Start: 2023-05-11

## 2023-05-11 RX ORDER — HYDROCODONE BITARTRATE AND ACETAMINOPHEN 10; 325 MG/15ML; MG/15ML
15 SOLUTION ORAL EVERY 6 HOURS PRN
Qty: 60 ML | Refills: 0 | Status: SHIPPED | OUTPATIENT
Start: 2023-05-11 | End: 2023-05-15

## 2023-05-11 RX ORDER — OXYBUTYNIN CHLORIDE 5 MG/5ML
5 SYRUP ORAL 2 TIMES DAILY
Qty: 120 ML | Refills: 3 | Status: SHIPPED | OUTPATIENT
Start: 2023-05-11

## 2023-05-11 NOTE — PROGRESS NOTES
5/11/2023      Chief Complaint   Patient presents with   • Ureteral Stone     Assessment and Plan    49 y o  female managed by our office    1  Nephrolithiasis  · Status post cystoscopy, retropyelogram and insertion of bilateral ureteral stent 5/4/2023 by Dr Anne Piedra  · Continue with tamsulosin  · Prescription for liquid Vicodin, ibuprofen, Zofran, oxybutynin provided  · Discussed the need to maintain adequate hydration  · ER precautions discussed  · Urine culture ordered  · OR case requested  · OR consent obtained/signed  · Follow-up in the office postoperatively as scheduled      History of Present Illness  Giacomo Chávez is a 49 y o  female here for follow up evaluation of nephrolithiasis  Patient presented to the emergency department with complaints of left-sided flank pain and discomfort she was found to have bilateral nephrolithiasis with obstructing left ureteral calculus  She was taken to the operating room by Dr Anne Piedra 5/4/2023 for cystoscopy, bilateral ureteroscopy with laser lithotripsy, retrograde pyelogram and insertion of bilateral ureteral stents  She presents to the office today for evaluation and scheduling of staged ureteroscopy procedure  On evaluation in the office today patient with consistent/continued flank pain with associated nausea and vomiting  Patient denies history of complication secondary to anesthesia with exception of mild nausea postoperatively  She denies recent onset of chest pain, shortness of breath and dizziness  Review of Systems   Constitutional: Negative for chills and fever  Respiratory: Negative for cough and shortness of breath  Cardiovascular: Negative for chest pain  Gastrointestinal: Positive for nausea and vomiting  Negative for abdominal distention, abdominal pain and blood in stool  Genitourinary: Positive for flank pain and urgency  Negative for difficulty urinating, dysuria, enuresis, frequency and hematuria  Skin: Negative for rash       Past Medical History  Past Medical History:   Diagnosis Date   • Abdominal pain    • Acute cystitis with hematuria 1/3/2020   • Brain condition     Pseudotumor Cerebri    • Chronic kidney disease    • COVID-19 virus infection 11/10/2022   • De Quervain's disease (tenosynovitis)    • Esophageal perforation    • Gastric leak    • GERD (gastroesophageal reflux disease)    • Headache    • Idiopathic intracranial hypertension    • Kidney stone    • Migraine    • No blood products     per pt: personal and Protestant beliefs  surgeon office aware 12/13/19   • Papilledema, both eyes    • PONV (postoperative nausea and vomiting)    • Postgastrectomy malabsorption    • Presence of lumboperitoneal shunt     Resolved: Sep 20, 2017   • Rotator cuff tendinitis     Resolved: Aug 23, 2017   • Visual field defect        Past Social History  Past Surgical History:   Procedure Laterality Date   • BREAST BIOPSY Left 1993    benign   • CSF SHUNT      LP shunt - 2015 -  shunt - 2017   • ESOPHAGOGASTRODUODENOSCOPY N/A 02/23/2018    Procedure: ESOPHAGOGASTRODUODENOSCOPY (EGD) WITH ESOPHAGEAL STENT PLACEMENT;  Surgeon: Andrew Hanley MD;  [facility];  Service: Thoracic   • ESOPHAGOGASTRODUODENOSCOPY N/A 02/23/2018    Procedure: ESOPHAGOGASTRODUODENOSCOPY (EGD) WITH REMOVAL ESOPHAGEAL STENT  AND REPLACEMENT WITH 23mm X155mm 1111 [address] [place] [address],[address];  Surgeon: Andrew Hanley MD;  [facility];  Service: Thoracic   • ESOPHAGOGASTRODUODENOSCOPY N/A 03/28/2018    Procedure: ESOPHAGOGASTRODUODENOSCOPY (EGD) with PEJ placement ;  Surgeon: Refugio Jacobson MD;  [facility]; Service: Gastroenterology   • ESOPHAGOGASTRODUODENOSCOPY N/A 04/05/2018    Procedure: ESOPHAGOGASTRODUODENOSCOPY (EGD) with botox injection and kaofed placement;  Surgeon: Steph Clark DO;  [facility];   Service: Gastroenterology   • ESOPHAGOGASTRODUODENOSCOPY N/A 04/10/2018    Procedure: ESOPHAGOGASTRODUODENOSCOPY (EGD) with Deatrice Folks "placement;  Surgeon: Yovani Ross MD;  [facility]; Service: Gastroenterology   • ESOPHAGOSCOPY WITH STENT INSERTION N/A 01/24/2018    Procedure: INSERTION STENT ESOPHAGEAL;  Surgeon: Addy Cash MD;  [facility]; Service: Gastroenterology   • EYE SURGERY Bilateral 1980    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)    • FL RETROGRADE PYELOGRAM  06/24/2020   • FL RETROGRADE PYELOGRAM  08/21/2021   • FL RETROGRADE PYELOGRAM  5/4/2023   • GASTRIC BYPASS  12/19/2016    Lap sleeve gastrectomy w/shunt length shortening procedure   • HIATAL HERNIA REPAIR     • HYSTERECTOMY  03/14/2013   • IR LUMBAR PUNCTURE  08/29/2018   • LAPAROTOMY N/A 01/25/2018    Procedure: Exploratory Laparotomy, wash out,placement of drains, placement of NG feeding tube ;   Surgeon: Marielle Mckee DO;  [facility];  Service: General   • LUMBAR PERITONEAL SHUNT  11/19/2015    Laparoscopic assisted   • CT BREAST REDUCTION Bilateral 03/25/2022    Procedure: BILATERAL BREAST REDUCTION;  Surgeon: Radha Gaxiola MD;  [facility];  Service: Plastics   • CT CRTJ SHUNT [id number] Right 12/18/2019    Procedure: IMAGE GUIDED INSERTION OF RIGHT CORONAL VENTRICULAR-ATRIAL SHUNT;  Surgeon: Tracy Frost MD;  [facility];  Service: Neurosurgery   • CT CRTJ SHUNT [id number] TERMINUS Right 05/31/2017    Procedure: IMAGE GUIDED CORONAL PLACEMENT OF PROGRAMABLE VENTRICULAR-PERITONEAL SHUNT, REMOVAL OF LP SHUNT ;  Surgeon: Tracy Frost MD;  [facility];  Service: Neurosurgery   • CT CYSTO BLADDER W/URETERAL CATHETERIZATION N/A 06/06/2020    Procedure: Bilateral CYSTOSCOPY RETROGRADE PYELOGRAM WITH INSERTION STENT URETERAL;  Surgeon: Milvia Lizarraga MD;  Location[facility];  Service: Urology   • CT CYSTO BLADDER W/URETERAL CATHETERIZATION Bilateral 5/4/2023    Procedure: CYSTOSCOPY RETROGRADE PYELOGRAM WITH INSERTION STENT URETERAL;  Surgeon: Kisha Pan MD;  " [facility]: [facility];  Service: Urology   • ND CYSTO/URETERO W/LITHOTRIPSY &INDWELL STENT INSRT Bilateral 06/24/2020    Procedure: CYSTOSCOPY URETEROSCOPY WITH LITHOTRIPSY HOLMIUM LASER, RETROGRADE PYELOGRAM AND exchange bilateral  STENTs URETERAL;  Surgeon: Sherita Maguire MD;  [facility];  Service: Urology   • ND CYSTO/URETERO W/LITHOTRIPSY &INDWELL STENT INSRT Left 08/21/2021    Procedure: CYSTOSCOPY; LEFT URETEROSCOPY WITH RETROGRADE PYELOGRAM, REMOVAL OF STONE AND INSERTION LEFT URETERAL STENT;  Surgeon: Laurent Arias MD;  [facility];  Service: Urology   • ND EGD TRANSORAL BIOPSY SINGLE/MULTIPLE N/A 06/27/2018    Procedure: ESOPHAGOGASTRODUODENOSCOPY (EGD) with padlock clip placement;  Surgeon: Cee Luna MD;  [facility];   Service: Bariatrics   • ND RMVL COMPL CSF SHUNT SYSTEM W/O RPLCMT SHUNT Right 02/05/2018    Procedure: Removal of  shunt;  Surgeon: Cande Mccarty MD;  [facility];  Service: Neurosurgery   • ND RPLCMT/REVJ CSF SHUNT VALVE/CATH SHUNT SYS Right 01/25/2018    Procedure: Externalization of right-sided SHUNT VENTRICULAR-PERITONEAL in anterior chest wall ribs two and three level  ;  Surgeon: Emil Mantilla MD;  [facility];  Service: Neurosurgery   • REDUCTION MAMMAPLASTY Bilateral    • WRIST SURGERY Right     x3 2006, 2008     Social History     Tobacco Use   Smoking Status Former   • Packs/day: 0 50   • Years: 20 00   • Pack years: 10 00   • Types: Cigarettes   • Quit date: 8/24/2012   • Years since quitting: 10 7   Smokeless Tobacco Never       Past Family History  Family History   Problem Relation Age of Onset   • Kidney cancer Mother 77   • No Known Problems Father    • No Known Problems Sister    • No Known Problems Daughter    • No Known Problems Maternal Grandmother    • Colon cancer Maternal Grandfather 44   • No Known Problems Paternal Grandmother    • Cancer Paternal Grandfather    • Thyroid cancer Brother 36   • No Known Problems Maternal Aunt    • No Known Problems Maternal Aunt    • No Known Problems Paternal Aunt    • Cancer Family         Gastric   • Leukemia Family    • Colon cancer Family    • Pancreatic cancer Family    • Lung cancer Maternal Uncle 64       Past Social history  Social History     Socioeconomic History   • Marital status: Single     Spouse name: Not on file   • Number of children: 2   • Years of education: High School or GED    • Highest education level: Not on file   Occupational History   • Occupation: employed    Tobacco Use   • Smoking status: Former     Packs/day: 0 50     Years: 20 00     Pack years: 10 00     Types: Cigarettes     Quit date: 8/24/2012     Years since quitting: 10 7   • Smokeless tobacco: Never   Vaping Use   • Vaping Use: Every day   • Substances: THC, CBD   Substance and Sexual Activity   • Alcohol use: Never   • Drug use: Yes     Frequency: 7 0 times per week     Types: Marijuana     Comment: medical marijuana, vaping & topical    • Sexual activity: Yes   Other Topics Concern   • Not on file   Social History Narrative    ** Merged History Encounter **          Social Determinants of Health     Financial Resource Strain: Not on file   Food Insecurity: No Food Insecurity   • Worried About Running Out of Food in the Last Year: Never true   • Ran Out of Food in the Last Year: Never true   Transportation Needs: No Transportation Needs   • Lack of Transportation (Medical): No   • Lack of Transportation (Non-Medical):  No   Physical Activity: Not on file   Stress: Not on file   Social Connections: Not on file   Intimate Partner Violence: Not on file   Housing Stability: Low Risk    • Unable to Pay for Housing in the Last Year: No   • Number of Places Lived in the Last Year: 1   • Unstable Housing in the Last Year: No       Current Medications  Current Outpatient Medications   Medication Sig Dispense Refill   • ascorbic acid (VITAMIN C) 250 MG CHEW Chew 250 mg daily     • Biotin 1 MG CAPS Take 1 mg by mouth daily       • calcium citrate-vitamin D (CITRACAL+D) 315-200 MG-UNIT per tablet Take 1 tablet by mouth 2 (two) times a day     • famotidine (PEPCID) 40 MG tablet take 1 tablet by mouth once daily 30 tablet 2   • ferrous gluconate 324 (37 5 Fe) mg Take 324 mg by mouth 2 (two) times a day before meals     • folic acid (FOLVITE) 1 mg tablet Take 2 tablets (2 mg total) by mouth daily 180 tablet 3   • HYDROcodone-acetaminophen (ZAMCET)  MG/15ML solution Take 15 mL by mouth every 6 (six) hours as needed for moderate pain for up to 10 days Max Daily Amount: 60 mL 60 mL 0   • ibuprofen (MOTRIN) 100 mg/5 mL suspension Take 30 mL (600 mg total) by mouth every 6 (six) hours as needed for mild pain 473 mL 1   • Multiple Vitamin (MULTIVITAMIN) tablet Take 1 tablet by mouth daily Wears a patch     • Multiple Vitamins-Minerals (Hair/Skin/Nails) CAPS Take 1 tablet by mouth 2 (two) times a day       • omeprazole (PriLOSEC) 40 MG capsule take 1 capsule by mouth twice a day 60 capsule 2   • ondansetron (ZOFRAN-ODT) 4 mg disintegrating tablet Take 1 tablet (4 mg total) by mouth every 6 (six) hours as needed for nausea or vomiting 20 tablet 1   • oxybutynin (DITROPAN) 5 MG/5ML syrup Take 5 mL (5 mg total) by mouth 2 (two) times a day 120 mL 3   • phenazopyridine (PYRIDIUM) 200 mg tablet Take 1 tablet (200 mg total) by mouth 3 (three) times a day with meals 10 tablet 0   • tamsulosin (FLOMAX) 0 4 mg Take 1 capsule (0 4 mg total) by mouth daily with dinner 30 capsule 0   • vitamin B-12 (VITAMIN B-12) 500 mcg tablet Take 500 mcg by mouth daily     • ciprofloxacin (CIPRO) 500 mg tablet Take 1 tablet (500 mg total) by mouth every 12 (twelve) hours for 14 doses 14 tablet 0     No current facility-administered medications for this visit         Allergies  Allergies   Allergen Reactions   • Benadryl [Diphenhydramine] Anaphylaxis     Throat closing   • Phenergan [Promethazine] Anaphylaxis         The following portions of the "patient's history were reviewed and updated as appropriate: allergies, current medications, past medical history, past social history, past surgical history and problem list       Vitals  Vitals:    05/11/23 1514   BP: 140/80   BP Location: Left arm   Patient Position: Sitting   Cuff Size: Large   Pulse: (!) 113   Temp: 98 4 °F (36 9 °C)   SpO2: 97%   Weight: 63 kg (139 lb)   Height: 5' 3\" (1 6 m)           Physical Exam  Physical Exam  Vitals reviewed  Constitutional:       General: She is not in acute distress  Appearance: Normal appearance  Cardiovascular:      Rate and Rhythm: Normal rate and regular rhythm  Heart sounds: Normal heart sounds  Pulmonary:      Effort: Pulmonary effort is normal  No respiratory distress  Breath sounds: Normal breath sounds  Abdominal:      Palpations: Abdomen is soft  Musculoskeletal:         General: Normal range of motion  Skin:     General: Skin is warm and dry  Neurological:      General: No focal deficit present  Mental Status: She is alert  Psychiatric:         Mood and Affect: Mood normal          Behavior: Behavior normal        Results  No results found for this or any previous visit (from the past 1 hour(s))  ]  No results found for: PSA  Lab Results   Component Value Date    GLUCOSE 94 01/25/2018    CALCIUM 8 5 05/07/2023     03/22/2018    K 3 9 05/07/2023    CO2 30 05/07/2023     05/07/2023    BUN 21 05/07/2023    CREATININE 0 54 (L) 05/07/2023     Lab Results   Component Value Date    WBC 5 37 05/07/2023    HGB 12 8 05/07/2023    HCT 39 3 05/07/2023    MCV 92 05/07/2023     (L) 05/07/2023     CT ABDOMEN AND PELVIS WITHOUT IV CONTRAST - LOW DOSE RENAL STONE     INDICATION:   Flank pain, kidney stone suspected  left flank pain, likely stone      COMPARISON: March 23, 2022     TECHNIQUE:  Low radiation dose thin section CT examination of the abdomen and pelvis was performed without intravenous or oral contrast according " to a protocol specifically designed to evaluate for urinary tract calculus  Axial, sagittal, and coronal 2D   reformatted images were created from the source data and submitted for interpretation  Evaluation for pathology in the abdomen and pelvis that is unrelated to urinary tract calculi is limited      Radiation dose length product (DLP) for this visit:  306 mGy-cm   This examination, like all CT scans performed in the [facility], was performed utilizing techniques to minimize radiation dose exposure, including the use of iterative   reconstruction and automated exposure control      URINARY TRACT FINDINGS:     RIGHT KIDNEY AND URETER: At least 4 nonobstructing collecting system calculi, largest measuring 5 mm  No hydronephrosis  There is a nonobstructing proximal right ureter stones measuring 3 x 4 mm image 2/89     LEFT KIDNEY AND URETER: At least 3 nonobstructing renal collecting system calculi, largest measuring 3 mm  Dilated extrarenal pelvis  Moderate hydronephrosis  Large obstructing calculus at the UPJ measuring up to 7 mm  Ureter below this level is   decompressed  Numerous pelvic phleboliths bilaterally      URINARY BLADDER:  Unremarkable         ADDITIONAL FINDINGS:     LOWER CHEST:  No clinically significant abnormality identified in the visualized lower chest      SOLID VISCERA: Limited low radiation dose noncontrast CT evaluation demonstrates no clinically significant abnormality of the imaged portions of the liver, spleen, pancreas, or adrenal glands      GALLBLADDER/BILIARY TREE:  No calcified gallstones  No pericholecystic inflammatory change  No biliary dilatation      STOMACH AND BOWEL: Limited evaluation of GI tract without oral contrast  Mild distal esophagitis  Hiatal hernia  Gastric sleeve bariatric surgery  Small bowel is average caliber  Large bowel unremarkable      APPENDIX:  No findings to suggest appendicitis      ABDOMINOPELVIC CAVITY:  No ascites    No pneumoperitoneum  No lymphadenopathy      REPRODUCTIVE ORGANS: Prior hysterectomy  No adnexal mass      ABDOMINAL WALL/INGUINAL REGIONS:  Unremarkable      OSSEOUS STRUCTURES:  No acute fracture or destructive osseous lesion         IMPRESSION:  There is an obstructing 7 mm stone in the left UPJ  Urology consultation advised      Bilateral nonobstructing renal collecting system calculi  In addition there is a nonobstructing right ureter stone measuring 3 x 4 mm which is apparently asymptomatic        Orders  Orders Placed This Encounter   Procedures   • Urine culture     Standing Status:   Future     Number of Occurrences:   1     Standing Expiration Date:   5/11/2024     Order Specific Question:   Release to patient through [address] [place], [place] [id number]     Answer:   Immediate       ABNER Johnson

## 2023-05-13 LAB — BACTERIA UR CULT: NORMAL

## 2023-05-15 ENCOUNTER — NURSE TRIAGE (OUTPATIENT)
Dept: OTHER | Facility: OTHER | Age: 48
End: 2023-05-15

## 2023-05-15 RX ORDER — HYDROCODONE BITARTRATE AND ACETAMINOPHEN 5; 325 MG/1; MG/1
1 TABLET ORAL EVERY 6 HOURS PRN
Qty: 15 TABLET | Refills: 0 | Status: SHIPPED | OUTPATIENT
Start: 2023-05-15

## 2023-05-15 NOTE — TELEPHONE ENCOUNTER
Called and spoke with patient  Reports that she is having severe intense pain and nausea/vomiting  Reports Zofran not helping  Did advise pt look like tentative date for surgery is 5/24  Pt is upset said that she can not wait that long  States has been dealing with this since 5/5/23 and she is very uncomfortable  Did advise patient of provider recommendation if pain severe to go to ER  Did advise to go to either [facility] or [facility]  Pt was advised pain medication sent into the pharmacy per chart  Pt will have son  medication  Pt will see if this helps, if not advised ER again  Pt stated understanding

## 2023-05-15 NOTE — TELEPHONE ENCOUNTER
Okay, thank you  I did provide her prescription for Zofran the last time she was in the office    If she is unable to tolerate these medications or her pain is not controlled she may need to go to the emergency department for admission and possibly have the on- to her this week

## 2023-05-15 NOTE — TELEPHONE ENCOUNTER
Called to check on status of sx, was told it would be fast tracked and that she would get a call in 2 days  Also added that she's in extreme pain      Patient can be reached at [phone]

## 2023-05-15 NOTE — TELEPHONE ENCOUNTER
Patient called to ask that the pain medication called in to the pharmacy for her in liquid form be called in, in pill form because her pharmacy does not carry the liquid form of it  She said she's in extreme pain, asked us to use the same pharmacy and to call her once it's been called in      Patient can be reached at [phone]

## 2023-05-15 NOTE — TELEPHONE ENCOUNTER
Marian: YEs, if it is possible to fast track, I would appreciate it  She is in a severe amount of pain   Changed form of pain medication   Script sent to pharmacy

## 2023-05-15 NOTE — TELEPHONE ENCOUNTER
PT calling to see if surgery can be moved up due to the severe pain and pt stated she is constantly vomiting   Pt stated that the zofran is not working    Pt call back-[phone]

## 2023-05-16 ENCOUNTER — HOSPITAL ENCOUNTER (INPATIENT)
Facility: HOSPITAL | Age: 48
LOS: 2 days | Discharge: HOME | End: 2023-05-18
Attending: EMERGENCY MEDICINE | Admitting: INTERNAL MEDICINE

## 2023-05-16 DIAGNOSIS — R11.0 NAUSEA: ICD-10-CM

## 2023-05-16 DIAGNOSIS — N20.0 RENAL CALCULI: Primary | ICD-10-CM

## 2023-05-16 DIAGNOSIS — N20.0 NEPHROLITHIASIS: ICD-10-CM

## 2023-05-16 DIAGNOSIS — R10.9 FLANK PAIN: ICD-10-CM

## 2023-05-16 DIAGNOSIS — N20.1 OBSTRUCTION OF LEFT URETEROPELVIC JUNCTION (UPJ) DUE TO STONE: ICD-10-CM

## 2023-05-16 PROBLEM — N18.9 CHRONIC KIDNEY DISEASE: Status: ACTIVE | Noted: 2023-05-16

## 2023-05-16 PROBLEM — N18.9 CHRONIC KIDNEY DISEASE: Status: RESOLVED | Noted: 2023-05-16 | Resolved: 2023-05-16

## 2023-05-16 LAB
ALBUMIN SERPL BCP-MCNC: 3.2 G/DL (ref 3.5–5)
ALP SERPL-CCNC: 53 U/L (ref 46–116)
ALT SERPL W P-5'-P-CCNC: 15 U/L (ref 12–78)
ANION GAP SERPL CALCULATED.3IONS-SCNC: -1 MMOL/L (ref 4–13)
AST SERPL W P-5'-P-CCNC: 8 U/L (ref 5–45)
BACTERIA UR QL AUTO: ABNORMAL /HPF
BASOPHILS # BLD AUTO: 0.05 THOUSANDS/ÂΜL (ref 0–0.1)
BASOPHILS NFR BLD AUTO: 1 % (ref 0–1)
BILIRUB SERPL-MCNC: 0.43 MG/DL (ref 0.2–1)
BILIRUB UR QL STRIP: NEGATIVE
BUN SERPL-MCNC: 19 MG/DL (ref 5–25)
CALCIUM ALBUM COR SERPL-MCNC: 9.5 MG/DL (ref 8.3–10.1)
CALCIUM SERPL-MCNC: 8.9 MG/DL (ref 8.3–10.1)
CHLORIDE SERPL-SCNC: 110 MMOL/L (ref 96–108)
CLARITY UR: ABNORMAL
CO2 SERPL-SCNC: 27 MMOL/L (ref 21–32)
COLOR UR: YELLOW
CREAT SERPL-MCNC: 0.67 MG/DL (ref 0.6–1.3)
EOSINOPHIL # BLD AUTO: 0.19 THOUSAND/ÂΜL (ref 0–0.61)
EOSINOPHIL NFR BLD AUTO: 3 % (ref 0–6)
ERYTHROCYTE [DISTWIDTH] IN BLOOD BY AUTOMATED COUNT: 13.2 % (ref 11.6–15.1)
GFR SERPL CREATININE-BSD FRML MDRD: 104 ML/MIN/1.73SQ M
GLUCOSE SERPL-MCNC: 102 MG/DL (ref 65–140)
GLUCOSE UR STRIP-MCNC: NEGATIVE MG/DL
HCT VFR BLD AUTO: 45 % (ref 34.8–46.1)
HGB BLD-MCNC: 15.1 G/DL (ref 11.5–15.4)
HGB UR QL STRIP.AUTO: ABNORMAL
HYALINE CASTS #/AREA URNS LPF: ABNORMAL /LPF
IMM GRANULOCYTES # BLD AUTO: 0.06 THOUSAND/UL (ref 0–0.2)
IMM GRANULOCYTES NFR BLD AUTO: 1 % (ref 0–2)
KETONES UR STRIP-MCNC: NEGATIVE MG/DL
LEUKOCYTE ESTERASE UR QL STRIP: ABNORMAL
LYMPHOCYTES # BLD AUTO: 1.27 THOUSANDS/ÂΜL (ref 0.6–4.47)
LYMPHOCYTES NFR BLD AUTO: 18 % (ref 14–44)
MCH RBC QN AUTO: 29.5 PG (ref 26.8–34.3)
MCHC RBC AUTO-ENTMCNC: 33.6 G/DL (ref 31.4–37.4)
MCV RBC AUTO: 88 FL (ref 82–98)
MONOCYTES # BLD AUTO: 0.49 THOUSAND/ÂΜL (ref 0.17–1.22)
MONOCYTES NFR BLD AUTO: 7 % (ref 4–12)
MUCOUS THREADS UR QL AUTO: ABNORMAL
NEUTROPHILS # BLD AUTO: 5.03 THOUSANDS/ÂΜL (ref 1.85–7.62)
NEUTS SEG NFR BLD AUTO: 70 % (ref 43–75)
NITRITE UR QL STRIP: NEGATIVE
NON-SQ EPI CELLS URNS QL MICRO: ABNORMAL /HPF
NRBC BLD AUTO-RTO: 0 /100 WBCS
PH UR STRIP.AUTO: 7.5 [PH]
PLATELET # BLD AUTO: 290 THOUSANDS/UL (ref 149–390)
PMV BLD AUTO: 9.8 FL (ref 8.9–12.7)
POTASSIUM SERPL-SCNC: 4.1 MMOL/L (ref 3.5–5.3)
PROT SERPL-MCNC: 6.4 G/DL (ref 6.4–8.4)
PROT UR STRIP-MCNC: ABNORMAL MG/DL
RBC # BLD AUTO: 5.11 MILLION/UL (ref 3.81–5.12)
RBC #/AREA URNS AUTO: ABNORMAL /HPF
SODIUM SERPL-SCNC: 136 MMOL/L (ref 135–147)
SP GR UR STRIP.AUTO: 1.02 (ref 1–1.03)
UROBILINOGEN UR STRIP-ACNC: <2 MG/DL
WBC # BLD AUTO: 7.09 THOUSAND/UL (ref 4.31–10.16)
WBC #/AREA URNS AUTO: ABNORMAL /HPF

## 2023-05-16 RX ORDER — FAMOTIDINE 20 MG/1
20 TABLET, FILM COATED ORAL DAILY
Status: DISCONTINUED | OUTPATIENT
Start: 2023-05-17 | End: 2023-05-18 | Stop reason: HOSPADM

## 2023-05-16 RX ORDER — OXYCODONE HYDROCHLORIDE 5 MG/1
5 TABLET ORAL EVERY 6 HOURS PRN
Status: DISCONTINUED | OUTPATIENT
Start: 2023-05-16 | End: 2023-05-18 | Stop reason: HOSPADM

## 2023-05-16 RX ORDER — SODIUM CHLORIDE, SODIUM GLUCONATE, SODIUM ACETATE, POTASSIUM CHLORIDE, MAGNESIUM CHLORIDE, SODIUM PHOSPHATE, DIBASIC, AND POTASSIUM PHOSPHATE .53; .5; .37; .037; .03; .012; .00082 G/100ML; G/100ML; G/100ML; G/100ML; G/100ML; G/100ML; G/100ML
1000 INJECTION, SOLUTION INTRAVENOUS ONCE
Status: COMPLETED | OUTPATIENT
Start: 2023-05-16 | End: 2023-05-16

## 2023-05-16 RX ORDER — FOLIC ACID 1 MG/1
2 TABLET ORAL DAILY
Status: DISCONTINUED | OUTPATIENT
Start: 2023-05-17 | End: 2023-05-18 | Stop reason: HOSPADM

## 2023-05-16 RX ORDER — ENOXAPARIN SODIUM 100 MG/ML
40 INJECTION SUBCUTANEOUS DAILY
Status: DISCONTINUED | OUTPATIENT
Start: 2023-05-17 | End: 2023-05-16

## 2023-05-16 RX ORDER — ONDANSETRON 2 MG/ML
4 INJECTION INTRAMUSCULAR; INTRAVENOUS ONCE
Status: COMPLETED | OUTPATIENT
Start: 2023-05-16 | End: 2023-05-16

## 2023-05-16 RX ORDER — KETOROLAC TROMETHAMINE 30 MG/ML
15 INJECTION, SOLUTION INTRAMUSCULAR; INTRAVENOUS EVERY 6 HOURS PRN
Status: DISPENSED | OUTPATIENT
Start: 2023-05-16 | End: 2023-05-17

## 2023-05-16 RX ORDER — SODIUM CHLORIDE, SODIUM GLUCONATE, SODIUM ACETATE, POTASSIUM CHLORIDE, MAGNESIUM CHLORIDE, SODIUM PHOSPHATE, DIBASIC, AND POTASSIUM PHOSPHATE .53; .5; .37; .037; .03; .012; .00082 G/100ML; G/100ML; G/100ML; G/100ML; G/100ML; G/100ML; G/100ML
125 INJECTION, SOLUTION INTRAVENOUS CONTINUOUS
Status: DISCONTINUED | OUTPATIENT
Start: 2023-05-16 | End: 2023-05-18 | Stop reason: HOSPADM

## 2023-05-16 RX ORDER — ONDANSETRON 2 MG/ML
4 INJECTION INTRAMUSCULAR; INTRAVENOUS EVERY 6 HOURS PRN
Status: DISCONTINUED | OUTPATIENT
Start: 2023-05-16 | End: 2023-05-18 | Stop reason: HOSPADM

## 2023-05-16 RX ORDER — TAMSULOSIN HYDROCHLORIDE 0.4 MG/1
0.4 CAPSULE ORAL
Status: DISCONTINUED | OUTPATIENT
Start: 2023-05-16 | End: 2023-05-18 | Stop reason: HOSPADM

## 2023-05-16 RX ORDER — PANTOPRAZOLE SODIUM 40 MG/1
40 TABLET, DELAYED RELEASE ORAL
Status: DISCONTINUED | OUTPATIENT
Start: 2023-05-17 | End: 2023-05-18 | Stop reason: HOSPADM

## 2023-05-16 RX ORDER — HYDROMORPHONE HCL/PF 1 MG/ML
0.5 SYRINGE (ML) INJECTION ONCE
Status: COMPLETED | OUTPATIENT
Start: 2023-05-16 | End: 2023-05-16

## 2023-05-16 RX ADMIN — HYDROMORPHONE HYDROCHLORIDE 0.5 MG: 1 INJECTION, SOLUTION INTRAMUSCULAR; INTRAVENOUS; SUBCUTANEOUS at 16:07

## 2023-05-16 RX ADMIN — OXYCODONE HYDROCHLORIDE 5 MG: 5 TABLET ORAL at 22:40

## 2023-05-16 RX ADMIN — ONDANSETRON 4 MG: 2 INJECTION INTRAMUSCULAR; INTRAVENOUS at 16:58

## 2023-05-16 RX ADMIN — TAMSULOSIN HYDROCHLORIDE 0.4 MG: 0.4 CAPSULE ORAL at 18:09

## 2023-05-16 RX ADMIN — SODIUM CHLORIDE, SODIUM GLUCONATE, SODIUM ACETATE, POTASSIUM CHLORIDE, MAGNESIUM CHLORIDE, SODIUM PHOSPHATE, DIBASIC, AND POTASSIUM PHOSPHATE 125 ML/HR: .53; .5; .37; .037; .03; .012; .00082 INJECTION, SOLUTION INTRAVENOUS at 17:00

## 2023-05-16 RX ADMIN — SODIUM CHLORIDE, SODIUM GLUCONATE, SODIUM ACETATE, POTASSIUM CHLORIDE, MAGNESIUM CHLORIDE, SODIUM PHOSPHATE, DIBASIC, AND POTASSIUM PHOSPHATE 1000 ML: .53; .5; .37; .037; .03; .012; .00082 INJECTION, SOLUTION INTRAVENOUS at 13:41

## 2023-05-16 RX ADMIN — KETOROLAC TROMETHAMINE 15 MG: 30 INJECTION, SOLUTION INTRAMUSCULAR; INTRAVENOUS at 21:25

## 2023-05-16 RX ADMIN — ONDANSETRON 4 MG: 2 INJECTION INTRAMUSCULAR; INTRAVENOUS at 13:38

## 2023-05-16 RX ADMIN — HYDROMORPHONE HYDROCHLORIDE 0.5 MG: 1 INJECTION, SOLUTION INTRAMUSCULAR; INTRAVENOUS; SUBCUTANEOUS at 13:38

## 2023-05-16 NOTE — LETTER
[address] [facility] MED SURG 7  [address] [address]  Dept: [phone]    May 18, 2023     Patient: Eve Briseno   YOB: 1975   Date of Visit: 5/16/2023       To Whom it May Concern:    Suha Garay is under my professional care  She was seen in the hospital from 5/16/2023 to 5/19/2023  Please excuse patient from work from 5/8 through 5/23 as she was being managed by our health care team for medical conditions which required additional hospitalization and surgical intervention  She may return to work without limitations pending follow up office visit  If you have any questions or concerns, please don't hesitate to call           Sincerely,          Jesús Martinez PA-C

## 2023-05-16 NOTE — ED ATTENDING ATTESTATION
5/16/2023  IDuane MD, saw and evaluated the patient  I have discussed the patient with the resident/non-physician practitioner and agree with the resident's/non-physician practitioner's findings, Plan of Care, and MDM as documented in the resident's/non-physician practitioner's note, except where noted  All available labs and Radiology studies were reviewed  I was present for key portions of any procedure(s) performed by the resident/non-physician practitioner and I was immediately available to provide assistance  At this point I agree with the current assessment done in the Emergency Department  I have conducted an independent evaluation of this patient a history and physical is as follows:  Patient here with flank pain  Patient has known bilateral kidney stones, is scheduled for surgery in another couple of weeks but cannot bear the pain  Patient is having ongoing vomiting, bilateral flank pain  No fevers  No diarrhea  Review of systems otherwise negative and 12 systems reviewed  On exam the patient appears uncomfortable  She has good color  Her mucous membranes are moist   Her neck is supple and nontender with no adenopathy  Her heart is regular without murmurs, rubs, or gallops  Her lungs are clear with good air movement  Her abdomen is soft and diffusely tender with no rebound or guarding  Her extremities are intact  Medical decision making: Patient with bilateral kidney stones, severe uncontrolled unremitting pain    Plan to place IV, analgesics, consult urology for further management  ED Course         Critical Care Time  Procedures

## 2023-05-16 NOTE — ASSESSMENT & PLAN NOTE
47yo F with recent hospital admission 5/3 for left flank pain   CT renal study shows 7mm stone of the left UPJ as well as      • Patient reports pain improved today, anxious to go home  • 5/3 CT: Obstructing 7 mm stone in the left UPJ  • 5/3 urine culture grew E  coli  • 5/4 UA results reviewed positive for E  coli   Sensitive to Cipro, will discharge on Cipro x1 week today  • Urology consulted and following, S/P bilateral cystoscopy with stent placement on 5/4  • Continue Flomax and oxybutynin on discharge

## 2023-05-16 NOTE — ED PROVIDER NOTES
History  Chief Complaint   Patient presents with   • Flank Pain     Pt has b/l kidney stones and b/l stents  10/10 pain x2 weeks unrelieved by zofran and osmin  50 yo F PMHx of frequent and recurrent nephrolithiasis, pseudotumor cerebri, history of gastric sleeve complicated by anastomotic leak quiring repair presents ED complaining of 2 weeks of flank and lower abdominal pain  Patient states that this current episode started on 5/3 she was seen at [address]  She was found to have bilateral ureterolithiasis 7 mm on the left and 3 x 4 mm on the right  Patient was admitted to the hospital for 4 days, seen by urology and had stents placed bilaterally  She was discharged after 4 days with medication for pain management, antiemetics and antibiotics for treatment of UTI  Patient states that she has been unwell for the past 2 weeks with bilateral lower abdominal pain and bilateral flank pain  He states his numerous episodes of vomiting throughout the day secondary to pain and taking his medication  She endorses hematuria and dysuria no fevers or chills  Endorses constipation following chronic pain and occasional consumption and has not had a bowel movement in 2 days  She is worried about her ability to work at this time and is worried about being fired from her new job as she is in too much pain to go to work  Prior to Admission Medications   Prescriptions Last Dose Informant Patient Reported? Taking?    Biotin 1 MG CAPS   Yes No   Sig: Take 1 mg by mouth daily     HYDROcodone-acetaminophen (Norco) 5-325 mg per tablet   No No   Sig: Take 1 tablet by mouth every 6 (six) hours as needed for pain Max Daily Amount: 4 tablets   Multiple Vitamin (MULTIVITAMIN) tablet   Yes No   Sig: Take 1 tablet by mouth daily Wears a patch   Multiple Vitamins-Minerals (Hair/Skin/Nails) CAPS   Yes No   Sig: Take 1 tablet by mouth 2 (two) times a day     ascorbic acid (VITAMIN C) 250 MG CHEW   Yes No   Sig: Chew 250 mg daily   calcium citrate-vitamin D (CITRACAL+D) 315-200 MG-UNIT per tablet   Yes No   Sig: Take 1 tablet by mouth 2 (two) times a day   famotidine (PEPCID) 40 MG tablet   No No   Sig: take 1 tablet by mouth once daily   ferrous gluconate 324 (37 5 Fe) mg   Yes No   Sig: Take 324 mg by mouth 2 (two) times a day before meals   folic acid (FOLVITE) 1 mg tablet   No No   Sig: Take 2 tablets (2 mg total) by mouth daily   ibuprofen (MOTRIN) 100 mg/5 mL suspension   No No   Sig: Take 30 mL (600 mg total) by mouth every 6 (six) hours as needed for mild pain   omeprazole (PriLOSEC) 40 MG capsule   No No   Sig: take 1 capsule by mouth twice a day   ondansetron (ZOFRAN-ODT) 4 mg disintegrating tablet   No No   Sig: Take 1 tablet (4 mg total) by mouth every 6 (six) hours as needed for nausea or vomiting   oxybutynin (DITROPAN) 5 MG/5ML syrup   No No   Sig: Take 5 mL (5 mg total) by mouth 2 (two) times a day   phenazopyridine (PYRIDIUM) 200 mg tablet   No No   Sig: Take 1 tablet (200 mg total) by mouth 3 (three) times a day with meals   tamsulosin (FLOMAX) 0 4 mg   No No   Sig: Take 1 capsule (0 4 mg total) by mouth daily with dinner   vitamin B-12 (VITAMIN B-12) 500 mcg tablet   Yes No   Sig: Take 500 mcg by mouth daily      Facility-Administered Medications: None       Past Medical History:   Diagnosis Date   • Abdominal pain    • Acute cystitis with hematuria 1/3/2020   • Brain condition     Pseudotumor Cerebri    • Chronic kidney disease    • COVID-19 virus infection 11/10/2022   • De Quervain's disease (tenosynovitis)    • Esophageal perforation    • Gastric leak    • GERD (gastroesophageal reflux disease)    • Headache    • Idiopathic intracranial hypertension    • Kidney stone    • Migraine    • No blood products     per pt: personal and [employer] beliefs   surgeon office aware 12/13/19   • Papilledema, both eyes    • PONV (postoperative nausea and vomiting)    • Postgastrectomy malabsorption    • Presence of "lumboperitoneal shunt     Resolved: Sep 20, 2017   • Rotator cuff tendinitis     Resolved: Aug 23, 2017   • Visual field defect        Past Surgical History:   Procedure Laterality Date   • BREAST BIOPSY Left 1993    benign   • CSF SHUNT      LP shunt - 2015 -  shunt - 2017   • ESOPHAGOGASTRODUODENOSCOPY N/A 02/23/2018    Procedure: ESOPHAGOGASTRODUODENOSCOPY (EGD) WITH ESOPHAGEAL STENT PLACEMENT;  Surgeon: Candance Ferraris, MD;  [facility];  Service: Thoracic   • ESOPHAGOGASTRODUODENOSCOPY N/A 02/23/2018    Procedure: ESOPHAGOGASTRODUODENOSCOPY (EGD) WITH REMOVAL ESOPHAGEAL STENT  AND REPLACEMENT WITH 23mm X155mm [address];  Surgeon: Candance Ferraris, MD;  [facility];  Service: Thoracic   • ESOPHAGOGASTRODUODENOSCOPY N/A 03/28/2018    Procedure: ESOPHAGOGASTRODUODENOSCOPY (EGD) with PEJ placement ;  Surgeon: Ajith Ureña MD;  [facility]; Service: Gastroenterology   • ESOPHAGOGASTRODUODENOSCOPY N/A 04/05/2018    Procedure: ESOPHAGOGASTRODUODENOSCOPY (EGD) with botox injection and kaofed placement;  Surgeon: Violeta Prakash DO;  [facility]; Service: Gastroenterology   • ESOPHAGOGASTRODUODENOSCOPY N/A 04/10/2018    Procedure: ESOPHAGOGASTRODUODENOSCOPY (EGD) with Kaofed placement;  Surgeon: Donavon Navas MD;  [facility]; Service: Gastroenterology   • ESOPHAGOSCOPY WITH STENT INSERTION N/A 01/24/2018    Procedure: INSERTION STENT ESOPHAGEAL;  Surgeon: Pablo Franklin MD;  [facility];   Service: Gastroenterology   • EYE SURGERY Bilateral 1980    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)    • FL RETROGRADE PYELOGRAM  06/24/2020   • FL RETROGRADE PYELOGRAM  08/21/2021   • FL RETROGRADE PYELOGRAM  5/4/2023   • GASTRIC BYPASS  12/19/2016    Lap sleeve gastrectomy w/shunt length shortening procedure   • HIATAL HERNIA REPAIR     • HYSTERECTOMY  03/14/2013   • IR LUMBAR PUNCTURE  08/29/2018   • LAPAROTOMY N/A 01/25/2018    Procedure: " Exploratory Laparotomy, wash out,placement of drains, placement of NG feeding tube ;   Surgeon: Belen Faria DO;  [facility];  Service: General   • LUMBAR PERITONEAL SHUNT  11/19/2015    Laparoscopic assisted   • IN BREAST REDUCTION Bilateral 03/25/2022    Procedure: BILATERAL BREAST REDUCTION;  Surgeon: Clarice Richards MD;  [facility];  Service: Plastics   • IN CRTJ SHUNT [id number] Right 12/18/2019    Procedure: IMAGE GUIDED INSERTION OF RIGHT CORONAL VENTRICULAR-ATRIAL SHUNT;  Surgeon: Alia Enriquez MD;  [facility];  Service: Neurosurgery   • IN CRTJ SHUNT [id number] TERMINUS Right 05/31/2017    Procedure: IMAGE GUIDED CORONAL PLACEMENT OF PROGRAMABLE VENTRICULAR-PERITONEAL SHUNT, REMOVAL OF LP SHUNT ;  Surgeon: Alia Enriquez MD;  [facility];  Service: Neurosurgery   • IN CYSTO BLADDER W/URETERAL CATHETERIZATION N/A 06/06/2020    Procedure: Bilateral CYSTOSCOPY RETROGRADE PYELOGRAM WITH INSERTION STENT URETERAL;  Surgeon: Moses Burdick MD;  Location: [address] MAIN OR;  Service: Urology   • IN CYSTO BLADDER W/URETERAL CATHETERIZATION Bilateral 5/4/2023    Procedure: CYSTOSCOPY RETROGRADE PYELOGRAM WITH INSERTION STENT URETERAL;  Surgeon: Hugo Abdi MD;  [facility];  Service: Urology   • IN CYSTO/URETERO W/LITHOTRIPSY &INDWELL STENT INSRT Bilateral 06/24/2020    Procedure: CYSTOSCOPY URETEROSCOPY WITH LITHOTRIPSY HOLMIUM LASER, RETROGRADE PYELOGRAM AND exchange bilateral  STENTs URETERAL;  Surgeon: Enma Delong MD;  [facility];  Service: Urology   • IN CYSTO/URETERO W/LITHOTRIPSY &INDWELL STENT INSRT Left 08/21/2021    Procedure: CYSTOSCOPY; LEFT URETEROSCOPY WITH RETROGRADE PYELOGRAM, REMOVAL OF STONE AND INSERTION LEFT URETERAL STENT;  Surgeon: Dre Friedman MD;  [facility];  Service: Urology   • IN EGD TRANSORAL BIOPSY SINGLE/MULTIPLE N/A 06/27/2018    Procedure: ESOPHAGOGASTRODUODENOSCOPY (EGD) with padlock clip placement;  Surgeon: Misha Patino MD;  [facility]; Service: Bariatrics   • MT RMVL COMPL CSF SHUNT SYSTEM W/O RPLCMT SHUNT Right 02/05/2018    Procedure: Removal of  shunt;  Surgeon: Gunjan Harmon MD;  [facility];  Service: Neurosurgery   • MT RPLCMT/REVJ CSF SHUNT VALVE/CATH SHUNT SYS Right 01/25/2018    Procedure: Externalization of right-sided SHUNT VENTRICULAR-PERITONEAL in anterior chest wall ribs two and three level  ;  Surgeon: Daniele Carrero MD;  [facility];  Service: Neurosurgery   • REDUCTION MAMMAPLASTY Bilateral    • WRIST SURGERY Right     x3 2006, 2008       Family History   Problem Relation Age of Onset   • Kidney cancer Mother 77   • No Known Problems Father    • No Known Problems Sister    • No Known Problems Daughter    • No Known Problems Maternal Grandmother    • Colon cancer Maternal Grandfather 44   • No Known Problems Paternal Grandmother    • Cancer Paternal Grandfather    • Thyroid cancer Brother 36   • No Known Problems Maternal Aunt    • No Known Problems Maternal Aunt    • No Known Problems Paternal Aunt    • Cancer Family         Gastric   • Leukemia Family    • Colon cancer Family    • Pancreatic cancer Family    • Lung cancer Maternal Uncle 64     I have reviewed and agree with the history as documented      E-Cigarette/Vaping   • E-Cigarette Use Current Every Day User    • Comments medical marijuana      E-Cigarette/Vaping Substances   • Nicotine No    • THC Yes    • CBD Yes    • Flavoring No    • Other Yes lotion   • Unknown No      Social History     Tobacco Use   • Smoking status: Former     Packs/day: 0 50     Years: 20 00     Pack years: 10 00     Types: Cigarettes     Quit date: 8/24/2012     Years since quitting: 10 7   • Smokeless tobacco: Never   Vaping Use   • Vaping Use: Every day   • Substances: THC, CBD   Substance Use Topics   • Alcohol use: Never   • Drug use: Yes     Frequency: 7 0 times per week     Types: Marijuana     Comment: medical marijuana, vaping & topical         Review of Systems   Constitutional: Negative for chills and fever  HENT: Negative for ear pain and sore throat  Eyes: Negative for pain and visual disturbance  Respiratory: Negative for cough and shortness of breath  Cardiovascular: Negative for chest pain and palpitations  Gastrointestinal: Positive for abdominal pain, nausea and vomiting  Genitourinary: Positive for dysuria and flank pain  Negative for hematuria  Musculoskeletal: Negative for arthralgias and back pain  Skin: Negative for color change and rash  Neurological: Negative for seizures and syncope  All other systems reviewed and are negative  Physical Exam  ED Triage Vitals [05/16/23 1232]   Temperature Pulse Respirations Blood Pressure SpO2   97 9 °F (36 6 °C) 62 18 135/63 97 %      Temp Source Heart Rate Source Patient Position - Orthostatic VS BP Location FiO2 (%)   Temporal Monitor Sitting Left arm --      Pain Score       10 - Worst Possible Pain             Orthostatic Vital Signs  Vitals:    05/16/23 1232 05/16/23 1315 05/16/23 1526 05/16/23 1645   BP: 135/63 131/62 112/61 122/66   Pulse: 62 (!) 54 56 (!) 52   Patient Position - Orthostatic VS: Sitting Lying Sitting Lying       Physical Exam  Vitals and nursing note reviewed  Constitutional:       General: She is not in acute distress  Appearance: She is well-developed  HENT:      Head: Normocephalic and atraumatic  Eyes:      Conjunctiva/sclera: Conjunctivae normal    Cardiovascular:      Rate and Rhythm: Normal rate and regular rhythm  Heart sounds: No murmur heard  Pulmonary:      Effort: Pulmonary effort is normal  No respiratory distress  Breath sounds: Normal breath sounds  Abdominal:      Palpations: Abdomen is soft  Tenderness: There is abdominal tenderness (RLQ, LLQ, and suprapubic pain)  There is right CVA tenderness and left CVA tenderness     Musculoskeletal: General: No swelling  Cervical back: Neck supple  Skin:     General: Skin is warm and dry  Capillary Refill: Capillary refill takes less than 2 seconds  Neurological:      Mental Status: She is alert  Psychiatric:         Mood and Affect: Mood normal          ED Medications  Medications   famotidine (PEPCID) tablet 20 mg (has no administration in time range)   folic acid (FOLVITE) tablet 2 mg (has no administration in time range)   pantoprazole (PROTONIX) EC tablet 40 mg (has no administration in time range)   tamsulosin (FLOMAX) capsule 0 4 mg (has no administration in time range)   cyanocobalamin (VITAMIN B-12) tablet 500 mcg (has no administration in time range)   multi-electrolyte (PLASMALYTE-A/ISOLYTE-S PH 7 4) IV solution (125 mL/hr Intravenous New Bag 5/16/23 1700)   ondansetron (ZOFRAN) injection 4 mg (4 mg Intravenous Given 5/16/23 1658)   ketorolac (TORADOL) injection 15 mg (has no administration in time range)   oxyCODONE (ROXICODONE) IR tablet 5 mg (has no administration in time range)   multi-electrolyte (ISOLYTE-S PH 7 4) bolus 1,000 mL (0 mL Intravenous Stopped 5/16/23 1441)   ondansetron (ZOFRAN) injection 4 mg (4 mg Intravenous Given 5/16/23 1338)   HYDROmorphone (DILAUDID) injection 0 5 mg (0 5 mg Intravenous Given 5/16/23 1338)   HYDROmorphone (DILAUDID) injection 0 5 mg (0 5 mg Intravenous Given 5/16/23 1607)       Diagnostic Studies  Results Reviewed     Procedure Component Value Units Date/Time    Urine Microscopic [[id number]]  (Abnormal) Collected: 05/16/23 1641    Lab Status: Final result Specimen: Urine, Other Updated: 05/16/23 1721     RBC, UA Innumerable /hpf      WBC, UA 30-50 /hpf      Epithelial Cells Moderate /hpf      Bacteria, UA None Seen /hpf      MUCUS THREADS Innumerable     Hyaline Casts, UA 5-10 /lpf     Urine culture [[id number]] Collected: 05/16/23 1641    Lab Status:  In process Specimen: Urine, Other Updated: 05/16/23 1721    UA w Reflex to Microscopic w Reflex to Culture [[id number]]  (Abnormal) Collected: 05/16/23 1641    Lab Status: Final result Specimen: Urine, Other Updated: 05/16/23 1717     Color, UA Yellow     Clarity, UA Hazy     Specific [place], UA 1 018     pH, UA 7 5     Leukocytes, UA Small     Nitrite, UA Negative     Protein, UA 50 (1+) mg/dl      Glucose, UA Negative mg/dl      Ketones, UA Negative mg/dl      Urobilinogen, UA <2 0 mg/dl      Bilirubin, UA Negative     Occult Blood, UA Large    Comprehensive metabolic panel [[id number]]  (Abnormal) Collected: 05/16/23 1341    Lab Status: Final result Specimen: Blood from Arm, Left Updated: 05/16/23 1416     Sodium 136 mmol/L      Potassium 4 1 mmol/L      Chloride 110 mmol/L      CO2 27 mmol/L      ANION GAP -1 mmol/L      BUN 19 mg/dL      Creatinine 0 67 mg/dL      Glucose 102 mg/dL      Calcium 8 9 mg/dL      Corrected Calcium 9 5 mg/dL      AST 8 U/L      ALT 15 U/L      Alkaline Phosphatase 53 U/L      Total Protein 6 4 g/dL      Albumin 3 2 g/dL      Total Bilirubin 0 43 mg/dL      eGFR 104 ml/min/1 73sq m     Narrative:      [place] guidelines for Chronic Kidney Disease (CKD):   •  Stage 1 with normal or high GFR (GFR > 90 mL/min/1 73 square meters)  •  Stage 2 Mild CKD (GFR = 60-89 mL/min/1 73 square meters)  •  Stage 3A Moderate CKD (GFR = 45-59 mL/min/1 73 square meters)  •  Stage 3B Moderate CKD (GFR = 30-44 mL/min/1 73 square meters)  •  Stage 4 Severe CKD (GFR = 15-29 mL/min/1 73 square meters)  •  Stage 5 End Stage CKD (GFR <15 mL/min/1 73 square meters)  Note: GFR calculation is accurate only with a steady state creatinine    CBC and differential [[id number]] Collected: 05/16/23 1341    Lab Status: Final result Specimen: Blood from Arm, Left Updated: 05/16/23 1400     WBC 7 09 Thousand/uL      RBC 5 11 Million/uL      Hemoglobin 15 1 g/dL      Hematocrit 45 0 %      MCV 88 fL      MCH 29 5 pg      MCHC 33 6 g/dL      RDW 13 2 %      MPV 9 8 fL      Platelets 458 Thousands/uL      nRBC 0 /100 WBCs      Neutrophils Relative 70 %      Immat GRANS % 1 %      Lymphocytes Relative 18 %      Monocytes Relative 7 %      Eosinophils Relative 3 %      Basophils Relative 1 %      Neutrophils Absolute 5 03 Thousands/µL      Immature Grans Absolute 0 06 Thousand/uL      Lymphocytes Absolute 1 27 Thousands/µL      Monocytes Absolute 0 49 Thousand/µL      Eosinophils Absolute 0 19 Thousand/µL      Basophils Absolute 0 05 Thousands/µL                  No orders to display         Procedures  Procedures      ED Course  ED Course as of 05/16/23 1746   Tue May 16, 2023   1453 Contacted Urology on call for evaluation    1550 Contacted SOD for admission                              SBIRT 22yo+    Flowsheet Row Most Recent Value   Initial Alcohol Screen: US AUDIT-C     1  How often do you have a drink containing alcohol? 0 Filed at: 05/16/2023 1234   2  How many drinks containing alcohol do you have on a typical day you are drinking? 0 Filed at: 05/16/2023 1234   3a  Male UNDER 65: How often do you have five or more drinks on one occasion? 0 Filed at: 05/16/2023 1234   3b  FEMALE Any Age, or MALE 65+: How often do you have 4 or more drinks on one occassion? 0 Filed at: 05/16/2023 1234   Audit-C Score 0 Filed at: 05/16/2023 1234   IMAN: How many times in the past year have you    Used an illegal drug or used a prescription medication for non-medical reasons? Never Filed at: 05/16/2023 1234                Medical Decision Making  50 yo F PMHx of frequent and recurrent nephrolithiasis, pseudotumor cerebri, history of gastric sleeve complicated by anastomotic leak quiring repair presents ED complaining of 2 weeks of flank and lower abdominal pain  DDx: Bilateral ureterolithiasis, possible underlying UTI/ureteral obstruction  Obtain basic labs, urine to evaluate for underlying infection plan to speak with urology regarding his case    Labs largely unremarkable, urine positive for RBCs, WBCs but no bacteria  Case discussed with urology on-call, advising medical admission for pain control and antiemetics with plan to the patient to the OR for definitive management tomorrow a m  Patient made n p o  at midnight, case discussed with medicine  Patient admitted for further evaluation management  Amount and/or Complexity of Data Reviewed  Labs: ordered  Risk  Prescription drug management  Decision regarding hospitalization  Disposition  Final diagnoses:   Flank pain   Nephrolithiasis   Nausea     Time reflects when diagnosis was documented in both MDM as applicable and the Disposition within this note     Time User Action Codes Description Comment    5/16/2023  3:29 PM Shereen Landeros Add [N20 0] Renal calculi     5/16/2023  3:29 PM Girish Lease [N20 0] Renal calculi     5/16/2023  3:29 PM Fauzia Steele [N20 1] Obstruction of left ureteropelvic junction (UPJ) due to stone     5/16/2023  4:00 PM Immokalee Ortiz Add [R10 9] Flank pain     5/16/2023  4:00 PM Immokalee Ortiz Add [N20 0] Nephrolithiasis     5/16/2023  4:00 PM Hammad Ortiz Add [R11 0] Nausea       ED Disposition     ED Disposition   Admit    Condition   Stable    Date/Time   Tue May 16, 2023  4:00 PM    Comment   Case was discussed with Cheli and the patient's admission status was agreed to be Admission Status: inpatient status to the service of Dr Sunshine Feldman  Follow-up Information    None         Patient's Medications   Discharge Prescriptions    No medications on file     No discharge procedures on file  PDMP Review       Value Time User    PDMP Reviewed  Yes 6/24/2020  3:21 PM Daphnie Goldsmith MD           ED Provider  Attending physically available and evaluated Mariana Arreaga  CANDE managed the patient along with the ED Attending      Electronically Signed by         Estela Harris MD  05/16/23 3087

## 2023-05-16 NOTE — ASSESSMENT & PLAN NOTE
Follows with neurology  On eptinezumab infusions every 3 months (last infusion 4/26/23) for preventative therapy and Fioricet PRN for abortive  Plan:  · Can consider Fioricet if necessary while inpatient

## 2023-05-16 NOTE — H&P
INTERNAL MEDICINE RESIDENCY ADMISSION H&P     Name: William Montero   Age & Sex: 49 y o  female   MRN: [id number]  Unit/Bed#: ED 03   Encounter: [id number]  Primary Care Provider: Chace Scruggs DO    Code Status: Level 1 - Full Code  Admission Status: INPATIENT   Disposition: Patient requires Med/Surg    Admit to team: SOD Team C     ASSESSMENT/PLAN     Principal Problem:    Obstruction of left ureteropelvic junction (UPJ) due to stone  Active Problems:    Pseudotumor cerebri    Migraine with aura and without status migrainosus, not intractable      * Obstruction of left ureteropelvic junction (UPJ) due to stone  Assessment & Plan  Patient initially presented to [facility] 5/3 with concerns of left flank pain, CT stone study at that time showed obstructing 7mm stone in the left ureteropelvic junction, bilateral non-obstructing renal collecting system calculi, and a 3x4mm non-obstructing right ureteral stone that at the time was asymptomatic  Urine culture at that time positive for E  Coli  Patient completed 10 days of to antibiotics (ceftriaxone and ciprofloxacin)  Status post cystoscopy, bilateral ureteroscopy with laser lithotripsy, retrograde pyelogram and insertion of bilateral ureteral stents that admission  Was to follow up with urology outpatient for staged ureteroscopy procedure but presented to [facility] ED for worsening bilateral flank pain  Plan:  · Urology consult, and for potential and a procedure tomorrow  · N p o  midnight  · Pain regiment  · IV fluids  · Patient appears nontoxic denies UTI symptoms, will hold off on antibiotics    Pseudotumor cerebri  Assessment & Plan  S/p right Ventriculoatrial shunt  Follows with neurology  See plan below for migraines  Migraine with aura and without status migrainosus, not intractable  Assessment & Plan  Follows with neurology  On eptinezumab infusions every 3 months (last infusion 4/26/23) for preventative therapy and Fioricet PRN for abortive  Plan:  · Can consider Fioricet if necessary while inpatient  VTE Pharmacologic Prophylaxis: Sequential compression device (Venodyne)   VTE Mechanical Prophylaxis: sequential compression device    CHIEF COMPLAINT     Chief Complaint   Patient presents with   • Flank Pain     Pt has b/l kidney stones and b/l stents  10/10 pain x2 weeks unrelieved by zofran and prercocets  HISTORY OF PRESENT ILLNESS     Patient is a 40-year-old female past medical history of pseudotumor cerebri status post  shunt complicated by intractable headaches following with neurology, lap sleeve gastrectomy in 2016, nephrolithiasis, presenting with bilateral flank pain and hematuria  She was initially admitted 5/3/2023 to [address] [place] [address] with left flank pain, CT renal study at that time showing obstructing 7 mm stone in the left ureteropelvic junction with bilateral nonobstructing renal collecting system calculi and a 3 x 4 mm nonobstructing right ureteral stone that was asymptomatic at that time  Urine cultures during that admission were positive for E  coli and patient completed 10 days of antibiotic treatment including ceftriaxone and oral ciprofloxacin  She underwent cystoscopy and bilateral ureteroscopy with laser lithotripsy and retrograde pyelogram and insertion of bilateral ureteral stents that admission  She was following with urology outpatient for a eventual planned staged ureteroscopy procedure, however the patient's pain became unbearable and she presented to [facility] ED  In the ED, patient afebrile, HR 50s to 60s, RR 18, /66, SPO2 96 on RA  Labs showed WBC count of 7 09, hemoglobin of 15 1, creatinine of 0 67  UA is pending  Bedside ultrasound was performed in the ED and showed no abdominal aortic aneurysm  No other new imaging was performed  Urology was informed and patient will be an add-on for a potential surgical intervention tomorrow      Patient was seen bedside and was complaining of lateral flank pain and hematuria has been relatively constant since discharge from her previous hospital admission  Some intermittent nausea  Pink/orange urine  No other complaints  Patient will be admitted to the SOD C service  Per the patient, she is a level 1 full code  REVIEW OF SYSTEMS     Review of Systems   Constitutional: Negative for chills and fever  HENT: Negative for ear pain and sore throat  Eyes: Negative for pain and visual disturbance  Respiratory: Negative for cough and shortness of breath  Cardiovascular: Negative for chest pain and palpitations  Gastrointestinal: Positive for nausea  Negative for abdominal pain and vomiting  Genitourinary: Positive for flank pain and hematuria  Negative for dysuria  Musculoskeletal: Negative for arthralgias and back pain  Skin: Negative for color change and rash  Neurological: Negative for seizures and syncope  All other systems reviewed and are negative  OBJECTIVE     Vitals:    23 1526 23 1645 23 1923 23   BP: 112/61 122/66 132/69 113/59   BP Location: Right arm Left arm Left arm    Pulse: 56 (!) 52 55 56   Resp: 18 18 16    Temp:       TempSrc:       SpO2: 95% 96% 95% 93%      Temperature:   Temp (24hrs), Av 9 °F (36 6 °C), Min:97 9 °F (36 6 °C), Max:97 9 °F (36 6 °C)    Temperature: 97 9 °F (36 6 °C)  Intake & Output:  I/O        0701  /15 0700 /15 0701  /16 0700 / 0701  / 0700    I V    2000    Total Intake       Net   +2000               Weights: There is no height or weight on file to calculate BMI  Weight (last 2 days)     None        Physical Exam  Vitals and nursing note reviewed  Constitutional:       General: She is not in acute distress  Appearance: Normal appearance  She is well-developed  She is ill-appearing  She is not toxic-appearing  HENT:      Head: Normocephalic and atraumatic        Right Ear: External ear normal  Left Ear: External ear normal       Mouth/Throat:      Mouth: Mucous membranes are moist    Eyes:      Extraocular Movements: Extraocular movements intact  Conjunctiva/sclera: Conjunctivae normal       Pupils: Pupils are equal, round, and reactive to light  Cardiovascular:      Rate and Rhythm: Normal rate and regular rhythm  Pulses: Normal pulses  Heart sounds: Normal heart sounds  No murmur heard  No friction rub  No gallop  Pulmonary:      Effort: Pulmonary effort is normal  No respiratory distress  Breath sounds: Normal breath sounds  No wheezing, rhonchi or rales  Abdominal:      General: Abdomen is flat  Bowel sounds are normal  There is no distension  Palpations: Abdomen is soft  There is no mass  Tenderness: There is abdominal tenderness  There is right CVA tenderness and left CVA tenderness  There is no guarding or rebound  Hernia: No hernia is present  Comments: Midline surgical incision   Musculoskeletal:         General: No swelling or deformity  Cervical back: Neck supple  Right lower leg: No edema  Left lower leg: No edema  Skin:     General: Skin is warm and dry  Coloration: Skin is not jaundiced  Findings: No bruising, lesion or rash  Neurological:      General: No focal deficit present  Mental Status: She is alert and oriented to person, place, and time  Cranial Nerves: No cranial nerve deficit  Motor: No weakness        Gait: Gait normal       Deep Tendon Reflexes: Reflexes normal        PAST MEDICAL HISTORY     Past Medical History:   Diagnosis Date   • Abdominal pain    • Acute cystitis with hematuria 1/3/2020   • Brain condition     Pseudotumor Cerebri    • Chronic kidney disease    • COVID-19 virus infection 11/10/2022   • De Quervain's disease (tenosynovitis)    • Esophageal perforation    • Gastric leak    • GERD (gastroesophageal reflux disease)    • Headache    • Idiopathic intracranial hypertension "  • Kidney stone    • Migraine    • No blood products     per pt: personal and Muslim beliefs  surgeon office aware 12/13/19   • Papilledema, both eyes    • PONV (postoperative nausea and vomiting)    • Postgastrectomy malabsorption    • Presence of lumboperitoneal shunt     Resolved: Sep 20, 2017   • Rotator cuff tendinitis     Resolved: Aug 23, 2017   • Visual field defect      PAST SURGICAL HISTORY     Past Surgical History:   Procedure Laterality Date   • BREAST BIOPSY Left 1993    benign   • CSF SHUNT      LP shunt - 2015 -  shunt - 2017   • ESOPHAGOGASTRODUODENOSCOPY N/A 02/23/2018    Procedure: ESOPHAGOGASTRODUODENOSCOPY (EGD) WITH ESOPHAGEAL STENT PLACEMENT;  Surgeon: Letty Olivo MD;  [facility];  Service: Thoracic   • ESOPHAGOGASTRODUODENOSCOPY N/A 02/23/2018    Procedure: ESOPHAGOGASTRODUODENOSCOPY (EGD) WITH REMOVAL ESOPHAGEAL STENT  AND REPLACEMENT WITH 23mm X155mm [address];  Surgeon: Letty Olivo MD;  [facility];  Service: Thoracic   • ESOPHAGOGASTRODUODENOSCOPY N/A 03/28/2018    Procedure: ESOPHAGOGASTRODUODENOSCOPY (EGD) with PEJ placement ;  Surgeon: Teresa Heller MD;  Location: [facility]; Service: Gastroenterology   • ESOPHAGOGASTRODUODENOSCOPY N/A 04/05/2018    Procedure: ESOPHAGOGASTRODUODENOSCOPY (EGD) with botox injection and kaofed placement;  Surgeon: María June DO;  [facility]: [facility]; Service: Gastroenterology   • ESOPHAGOGASTRODUODENOSCOPY N/A 04/10/2018    Procedure: ESOPHAGOGASTRODUODENOSCOPY (EGD) with Kaofed placement;  Surgeon: Gerald Huertas MD;  [facility]: [facility]; Service: Gastroenterology   • ESOPHAGOSCOPY WITH STENT INSERTION N/A 01/24/2018    Procedure: INSERTION STENT ESOPHAGEAL;  Surgeon: Bernie Duckworth MD;  [facility]: [facility];   Service: Gastroenterology   • EYE SURGERY Bilateral 1980    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)    • FL RETROGRADE PYELOGRAM  06/24/2020   • FL RETROGRADE PYELOGRAM  " 08/21/2021   • FL RETROGRADE PYELOGRAM  5/4/2023   • GASTRIC BYPASS  12/19/2016    Lap sleeve gastrectomy w/shunt length shortening procedure   • HIATAL HERNIA REPAIR     • HYSTERECTOMY  03/14/2013   • IR LUMBAR PUNCTURE  08/29/2018   • LAPAROTOMY N/A 01/25/2018    Procedure: Exploratory Laparotomy, wash out,placement of drains, placement of NG feeding tube ;   Surgeon: Carlos Angel DO;  [facility];  Service: General   • LUMBAR PERITONEAL SHUNT  11/19/2015    Laparoscopic assisted   • PA BREAST REDUCTION Bilateral 03/25/2022    Procedure: BILATERAL BREAST REDUCTION;  Surgeon: Lila Chang MD;  [facility];  Service: Plastics   • PA CRTJ SHUNT [id number] Right 12/18/2019    Procedure: IMAGE GUIDED INSERTION OF RIGHT CORONAL VENTRICULAR-ATRIAL SHUNT;  Surgeon: Ollen Bloch, MD;  [facility];  Service: Neurosurgery   • PA CRTJ SHUNT [id number] TERMINUS Right 05/31/2017    Procedure: IMAGE GUIDED CORONAL PLACEMENT OF PROGRAMABLE VENTRICULAR-PERITONEAL SHUNT, REMOVAL OF LP SHUNT ;  Surgeon: Ollen Bloch, MD;  [facility];  Service: Neurosurgery   • PA CYSTO BLADDER W/URETERAL CATHETERIZATION N/A 06/06/2020    Procedure: Bilateral CYSTOSCOPY RETROGRADE PYELOGRAM WITH INSERTION STENT URETERAL;  Surgeon: Sean Tony MD;  Location: [address] MAIN OR;  Service: Urology   • PA CYSTO BLADDER W/URETERAL CATHETERIZATION Bilateral 5/4/2023    Procedure: CYSTOSCOPY RETROGRADE PYELOGRAM WITH INSERTION STENT URETERAL;  Surgeon: Thania Ochoa MD;  [facility];  Service: Urology   • PA CYSTO/URETERO W/LITHOTRIPSY &INDWELL STENT INSRT Bilateral 06/24/2020    Procedure: CYSTOSCOPY URETEROSCOPY WITH LITHOTRIPSY HOLMIUM LASER, RETROGRADE PYELOGRAM AND exchange bilateral  STENTs URETERAL;  Surgeon: Manoj Means MD;  [facility];  Service: Urology   • PA CYSTO/URETERO W/LITHOTRIPSY &INDWELL STENT INSRT Left 08/21/2021    Procedure: CYSTOSCOPY; LEFT URETEROSCOPY WITH RETROGRADE PYELOGRAM, REMOVAL OF STONE AND INSERTION LEFT URETERAL STENT;  Surgeon: Edison Rick MD;  [facility];  Service: Urology   • VT EGD TRANSORAL BIOPSY SINGLE/MULTIPLE N/A 06/27/2018    Procedure: ESOPHAGOGASTRODUODENOSCOPY (EGD) with padlock clip placement;  Surgeon: Gregoria Patel MD;  [facility];   Service: Bariatrics   • VT RMVL COMPL CSF SHUNT SYSTEM W/O RPLCMT SHUNT Right 02/05/2018    Procedure: Removal of  shunt;  Surgeon: Julia Cosby MD;  [facility];  Service: Neurosurgery   • VT RPLCMT/REVJ CSF SHUNT VALVE/CATH SHUNT SYS Right 01/25/2018    Procedure: Externalization of right-sided SHUNT VENTRICULAR-PERITONEAL in anterior chest wall ribs two and three level  ;  Surgeon: Diana Greer MD;  [facility];  Service: Neurosurgery   • REDUCTION MAMMAPLASTY Bilateral    • WRIST SURGERY Right     x3 2006, 2008     SOCIAL & FAMILY HISTORY     Social History     Substance and Sexual Activity   Alcohol Use Never       Social History     Substance and Sexual Activity   Drug Use Yes   • Frequency: 7 0 times per week   • Types: Marijuana    Comment: medical marijuana, vaping & topical      Social History     Tobacco Use   Smoking Status Former   • Packs/day: 0 50   • Years: 20 00   • Pack years: 10 00   • Types: Cigarettes   • Quit date: 8/24/2012   • Years since quitting: 10 7   Smokeless Tobacco Never     Family History   Problem Relation Age of Onset   • Kidney cancer Mother 77   • No Known Problems Father    • No Known Problems Sister    • No Known Problems Daughter    • No Known Problems Maternal Grandmother    • Colon cancer Maternal Grandfather 44   • No Known Problems Paternal Grandmother    • Cancer Paternal Grandfather    • Thyroid cancer Brother 36   • No Known Problems Maternal Aunt    • No Known Problems Maternal Aunt    • No Known Problems Paternal Aunt    • Cancer Family         Gastric   • Leukemia Family    • Colon cancer Family    • Pancreatic cancer Family    • Lung cancer Maternal Uncle 64     LABORATORY DATA     Labs: I have personally reviewed pertinent reports  Results from last 7 days   Lab Units 05/16/23  1341   WBC Thousand/uL 7 09   HEMOGLOBIN g/dL 15 1   HEMATOCRIT % 45 0   PLATELETS Thousands/uL 290   NEUTROS PCT % 70   MONOS PCT % 7      Results from last 7 days   Lab Units 05/16/23  1341   POTASSIUM mmol/L 4 1   CHLORIDE mmol/L 110*   CO2 mmol/L 27   BUN mg/dL 19   CREATININE mg/dL 0 67   CALCIUM mg/dL 8 9   ALK PHOS U/L 53   ALT U/L 15   AST U/L 8                          Micro:  Lab Results   Component Value Date    URINECX No Growth <1000 cfu/mL 05/11/2023    URINECX 50,000-59,000 cfu/ml Escherichia coli (A) 05/04/2023    URINECX <10,000 cfu/ml Escherichia coli (A) 05/04/2023    WOUNDCULT No growth 02/05/2018    WOUNDCULT 2+ Growth of Enterococcus faecalis (A) 01/25/2018     IMAGING & DIAGNOSTIC TESTS     Imaging: I have personally reviewed pertinent reports  No results found  EKG, Pathology, and Other Studies: I have personally reviewed pertinent reports  ALLERGIES     Allergies   Allergen Reactions   • Benadryl [Diphenhydramine] Anaphylaxis     Throat closing   • Phenergan [Promethazine] Anaphylaxis     MEDICATIONS PRIOR TO ARRIVAL     Prior to Admission medications    Medication Sig Start Date End Date Taking?  Authorizing Provider   ascorbic acid (VITAMIN C) 250 MG CHEW Chew 250 mg daily    Historical Provider, MD   Biotin 1 MG CAPS Take 1 mg by mouth daily      Historical Provider, MD   calcium citrate-vitamin D (CITRACAL+D) 315-200 MG-UNIT per tablet Take 1 tablet by mouth 2 (two) times a day    Historical Provider, MD   famotidine (PEPCID) 40 MG tablet take 1 tablet by mouth once daily 2/18/23   Nikolay Gregorio MD   ferrous gluconate 324 (37 5 Fe) mg Take 324 mg by mouth 2 (two) times a day before meals    Historical Provider, MD   folic acid (FOLVITE) 1 mg tablet Take 2 tablets (2 mg total) by mouth daily 2/9/23   ABNER Gomez   HYDROcodone-acetaminophen (Norco) 5-325 mg per tablet Take 1 tablet by mouth every 6 (six) hours as needed for pain Max Daily Amount: 4 tablets 5/15/23   ABNER Crum   ibuprofen (MOTRIN) 100 mg/5 mL suspension Take 30 mL (600 mg total) by mouth every 6 (six) hours as needed for mild pain 5/11/23   ABNER Crum   Multiple Vitamin (MULTIVITAMIN) tablet Take 1 tablet by mouth daily Wears a patch    Historical Provider, MD   Multiple Vitamins-Minerals (Hair/Skin/Nails) CAPS Take 1 tablet by mouth 2 (two) times a day      Historical Provider, MD   omeprazole (PriLOSEC) 40 MG capsule take 1 capsule by mouth twice a day 4/2/23   Irma Mcconnell MD   ondansetron (ZOFRAN-ODT) 4 mg disintegrating tablet Take 1 tablet (4 mg total) by mouth every 6 (six) hours as needed for nausea or vomiting 5/11/23   ABNER Crum   oxybutynin [facility]) 5 MG/5ML syrup Take 5 mL (5 mg total) by mouth 2 (two) times a day 5/11/23   ABNER Crum   phenazopyridine (PYRIDIUM) 200 mg tablet Take 1 tablet (200 mg total) by mouth 3 (three) times a day with meals 5/8/23   ABNER Santa   tamsulosin [facility]) 0 4 mg Take 1 capsule (0 4 mg total) by mouth daily with dinner 5/7/23 6/6/23  ABNER Rapp   vitamin B-12 (VITAMIN B-12) 500 mcg tablet Take 500 mcg by mouth daily    Historical Provider, MD     MEDICATIONS ADMINISTERED IN LAST 24 HOURS     Medication Administration - last 24 hours from 05/15/2023 2115 to 05/16/2023 2115       Date/Time Order Dose Route Action Action by     05/16/2023 1441 EDT multi-electrolyte (ISOLYTE-S PH 7 4) bolus 1,000 mL 0 mL Intravenous Stopped Jude Dowd RN     05/16/2023 1341 EDT multi-electrolyte (ISOLYTE-S PH 7 4) bolus 1,000 mL 1,000 mL Intravenous [address] Jude Dowd RN     05/16/2023 1338 EDT ondansetron (ZOFRAN) injection 4 mg 4 mg Intravenous Given Jude Dowd RN     05/16/2023 133 EDT HYDROmorphone "(DILAUDID) injection 0 5 mg 0 5 mg Intravenous Given Lorena Parrish RN     05/16/2023 1607 EDT HYDROmorphone (DILAUDID) injection 0 5 mg 0 5 mg Intravenous Given Quentin Caldwell RN     05/16/2023 1809 EDT tamsulosin (FLOMAX) capsule 0 4 mg 0 4 mg Oral Given Lorena Parrish RN     05/16/2023 1700 EDT multi-electrolyte (PLASMALYTE-A/ISOLYTE-S PH 7 4) IV solution 125 mL/hr Intravenous [address] Lorena Parrish RN     05/16/2023 1658 EDT ondansetron (ZOFRAN) injection 4 mg 4 mg Intravenous Given Lorena Parrish RN        CURRENT MEDICATIONS     Current Facility-Administered Medications   Medication Dose Route Frequency Provider Last Rate   • [START ON 5/17/2023] vitamin B-12  500 mcg Oral Daily Jak Stanford MD     • [START ON 5/17/2023] famotidine  20 mg Oral Daily Emily Desir MD     • [START ON 5/85/6657] folic acid  2 mg Oral Daily Emily Desir MD     • ketorolac  15 mg Intravenous Q6H PRN Jak Stanford MD     • multi-electrolyte  125 mL/hr Intravenous Continuous Emily Desir  mL/hr (05/16/23 1700)   • ondansetron  4 mg Intravenous Q6H PRN Emily Desir MD     • oxyCODONE  5 mg Oral Q6H PRN Emily Desir MD     • [START ON 5/17/2023] pantoprazole  40 mg Oral Early Morning Emily Desir MD     • tamsulosin  0 4 mg Oral Daily With [employer] MD Thang       multi-electrolyte, 125 mL/hr, Last Rate: 125 mL/hr (05/16/23 1700)      ketorolac, 15 mg, Q6H PRN  ondansetron, 4 mg, Q6H PRN  oxyCODONE, 5 mg, Q6H PRN        Admission Time  I spent 30 minutes admitting the patient  This involved direct patient contact where I performed a full history and physical, reviewing previous records, and reviewing laboratory and other diagnostic studies  Portions of the record may have been created with voice recognition software  Occasional wrong word or \"sound a like\" substitutions may have occurred due to the inherent limitations of voice recognition software    Read " the chart carefully and recognize, using context, where substitutions have occurred     ==  Rafia Dawson MD  [address]  Internal Medicine Residency PGY-2

## 2023-05-16 NOTE — TELEPHONE ENCOUNTER
"  Reason for Disposition  • [1] SEVERE pain (e g , excruciating, scale 8-10) AND [2] present > 1 hour    Answer Assessment - Initial Assessment Questions  1  LOCATION: \"Where does it hurt? \" (e g , left, right)      Lower back around to lower front  2  ONSET: \"When did the pain start? \"      Diagnosed with kidney stones on 5/3, stents placed  3  SEVERITY: \"How bad is the pain? \" (e g , Scale 1-10; mild, moderate, or severe)    - MILD (1-3): doesn't interfere with normal activities     - MODERATE (4-7): interferes with normal activities or awakens from sleep     - SEVERE (8-10): excruciating pain and patient unable to do normal activities (stays in bed)        10  4  PATTERN: \"Does the pain come and go, or is it constant? \"       Constant pain with exacerbations   5  CAUSE: \"What do you think is causing the pain? \"      Known kidney stones  6  OTHER SYMPTOMS:  \"Do you have any other symptoms? \" (e g , fever, abdominal pain, vomiting, leg weakness, burning with urination, blood in urine)     Vomiting every day since 5/3  7  PREGNANCY:  \"Is there any chance you are pregnant? \" \"When was your last menstrual period? \"      Does not get periods    Protocols used:  FLANK PAIN-ADULT-AH    "

## 2023-05-16 NOTE — TELEPHONE ENCOUNTER
Called and spoke with patient  Patient is currently at the [facility], reached out to the MD on BA via [employer]  Waiting to hear back  I let her know someone will call her back to give her an update

## 2023-05-16 NOTE — TELEPHONE ENCOUNTER
Called the [facility] and she is on for tomorrow with Dr Rony Dietrich  Called patient to let her know, and that our office will be in contact with her after her surgery

## 2023-05-16 NOTE — TELEPHONE ENCOUNTER
"Regarding: Kidney stones/ pain  ----- Message from Jeanette Oliveira sent at 5/15/2023  7:55 PM EDT -----  \"I have two kidney stones and I am in so much pain, I cant take it  I need to go to the ER so they can take them out  \"    "

## 2023-05-16 NOTE — ASSESSMENT & PLAN NOTE
Patient initially presented to [facility] 5/3 with concerns of left flank pain, CT stone study at that time showed obstructing 7mm stone in the left ureteropelvic junction, bilateral non-obstructing renal collecting system calculi, and a 3x4mm non-obstructing right ureteral stone that at the time was asymptomatic  Urine culture at that time positive for E  Coli  Patient completed 10 days of to antibiotics (ceftriaxone and ciprofloxacin)  Status post cystoscopy, bilateral ureteroscopy with laser lithotripsy, retrograde pyelogram and insertion of bilateral ureteral stents that admission  Was to follow up with urology outpatient for staged ureteroscopy procedure but presented to [facility] ED for worsening bilateral flank pain  Patient has a long complicated history of multiple renal stones  She did have gastric sleeve surgery which makes her prone to more stones; as well as was previously on Diamox  Work-up in the past has shown calcium oxalate stones, low urine calcium       Plan:  · Urology consult, OR 5/18  · Underwent cystoscopy with lithotripsy, bilateral stent removal, new left stent placement; Basket extraction  · Medications per urology on discharge  · Recommend outpatient stone work-up  · Follow-up with urology outpatient on 5/22/2023  · Patient appears nontoxic denies UTI symptoms and UA with mixed contaminants and therefore we will hold off on antibiotics at discharge

## 2023-05-16 NOTE — H&P
INTERNAL MEDICINE RESIDENCY ADMISSION H&P     Name: Wendy Fernandes   Age & Sex: 49 y o  female   MRN: [id number]  Unit/Bed#: ED 03   Encounter: [id number]  Primary Care Provider: Kimmie Andrade DO    Code Status: Level 1 - Full Code  Admission Status: {Admission Status:22861}  Disposition: Patient requires {Level of Care:83917}    Admit to team: SOD C    ASSESSMENT/PLAN     Active Problems:    Migraine with aura and without status migrainosus, not intractable    Obstruction of left ureteropelvic junction (UPJ) due to stone        fm    Migraine with aura and without status migrainosus, not intractable  Assessment & Plan  Follows with neurology  On eptinezumab infusions every 3 months (last infusion 4/26/23) for preventative therapy and Fioricet PRN for abortive  Plan:  · Can consider Fioricet if necessary while inpatient  VTE Pharmacologic Prophylaxis: {Pharmacologic VTE Prophylaxis:048434793}  VTE Mechanical Prophylaxis: {Mechanical VTE Prophylaxis:88003}    CHIEF COMPLAINT     Chief Complaint   Patient presents with   • Flank Pain     Pt has b/l kidney stones and b/l stents  10/10 pain x2 weeks unrelieved by zofran and prercocets  HISTORY OF PRESENT ILLNESS     50 yo woman with PMH significant for CKD, GERD, frequent kidney stones, b/l uretral stents, hiatal hernia repair, gastric bypass, and idiopathic intracranial hypertension presented to ED with her  with acute onset severe bilateral flank pain and hematuria  CT scan showing significant left hydronephrosis secondary to a 7 mm stone at the L2-3 vertebral level just at or below the UPJ  Incidentally was seen in a 6 mm stone at the right L3-4 vertebral level with no significant hydronephrosis  Patient reports being prone to kidney stones and having 35 stones since age 28  She denies any fever or chills but reports increasing spells of nausea/vomiting and decreased urine frequency  Her pain is not well controlled with pain medications  Pending urology consult  REVIEW OF SYSTEMS     Review of Systems   Constitutional: Negative  HENT: Negative  Eyes: Negative  Respiratory: Negative  Cardiovascular: Negative  Gastrointestinal: Positive for abdominal pain, constipation, nausea and vomiting  Negative for abdominal distention, anal bleeding, blood in stool, diarrhea and rectal pain  Endocrine: Negative  Genitourinary: Positive for decreased urine volume, difficulty urinating, dysuria, flank pain and hematuria  Negative for dyspareunia, enuresis, frequency, genital sores, menstrual problem, pelvic pain, urgency, vaginal bleeding, vaginal discharge and vaginal pain  Musculoskeletal: Positive for back pain  Negative for arthralgias, gait problem, joint swelling, myalgias, neck pain and neck stiffness  Skin: Negative  Neurological: Negative  OBJECTIVE     Vitals:    23 1232 23 1315 23 1526 23 1645   BP: 135/63 131/62 112/61 122/66   BP Location: Left arm Left arm Right arm Left arm   Pulse: 62 (!) 54 56 (!) 52   Resp: 18 18 18 18   Temp: 97 9 °F (36 6 °C)      TempSrc: Temporal      SpO2: 97% 97% 95% 96%      Temperature:   Temp (24hrs), Av 9 °F (36 6 °C), Min:97 9 °F (36 6 °C), Max:97 9 °F (36 6 °C)    Temperature: 97 9 °F (36 6 °C)  Intake & Output:  I/O        0701  05/15 0700 /15 0701  05/16 0700 05/16 0701  / 0700    I V    1000    Total Intake   1000    Net   +1000               Weights: There is no height or weight on file to calculate BMI  Weight (last 2 days)     None        Physical Exam  Constitutional:       Appearance: She is not toxic-appearing or diaphoretic  HENT:      Head: Normocephalic and atraumatic  Cardiovascular:      Rate and Rhythm: Normal rate and regular rhythm  Pulses: Normal pulses  Heart sounds: Normal heart sounds  No murmur heard  No friction rub  No gallop     Pulmonary:      Effort: Pulmonary effort is normal  No respiratory distress  Breath sounds: Normal breath sounds  No stridor  No wheezing, rhonchi or rales  Chest:      Chest wall: No tenderness  Abdominal:      General: Bowel sounds are normal       Tenderness: There is abdominal tenderness in the right lower quadrant and left lower quadrant  There is right CVA tenderness and left CVA tenderness  Musculoskeletal:         General: No swelling or tenderness  Right lower leg: No edema  Left lower leg: No edema  Skin:     General: Skin is warm and dry  Coloration: Skin is not jaundiced  Findings: No bruising, erythema, lesion or rash  Neurological:      Mental Status: She is alert and oriented to person, place, and time  Mental status is at baseline  Psychiatric:         Mood and Affect: Mood normal          Behavior: Behavior normal        PAST MEDICAL HISTORY     Past Medical History:   Diagnosis Date   • Abdominal pain    • Acute cystitis with hematuria 1/3/2020   • Brain condition     Pseudotumor Cerebri    • Chronic kidney disease    • COVID-19 virus infection 11/10/2022   • De Quervain's disease (tenosynovitis)    • Esophageal perforation    • Gastric leak    • GERD (gastroesophageal reflux disease)    • Headache    • Idiopathic intracranial hypertension    • Kidney stone    • Migraine    • No blood products     per pt: personal and Sikh beliefs   surgeon office aware 12/13/19   • Papilledema, both eyes    • PONV (postoperative nausea and vomiting)    • Postgastrectomy malabsorption    • Presence of lumboperitoneal shunt     Resolved: Sep 20, 2017   • Rotator cuff tendinitis     Resolved: Aug 23, 2017   • Visual field defect      PAST SURGICAL HISTORY     Past Surgical History:   Procedure Laterality Date   • BREAST BIOPSY Left 1993    benign   • CSF SHUNT      LP shunt - 2015 -  shunt - 2017   • ESOPHAGOGASTRODUODENOSCOPY N/A 02/23/2018    Procedure: ESOPHAGOGASTRODUODENOSCOPY (EGD) WITH ESOPHAGEAL STENT PLACEMENT;  Surgeon: Sharan Epley "Cielo Campuzano MD;  [facility];  Service: Thoracic   • ESOPHAGOGASTRODUODENOSCOPY N/A 02/23/2018    Procedure: ESOPHAGOGASTRODUODENOSCOPY (EGD) WITH REMOVAL ESOPHAGEAL STENT  AND REPLACEMENT WITH 23mm X155mm Stephanie Brow ESOPHAGEAL STENT;  Surgeon: Chris Webster MD;  [facility];  Service: Thoracic   • ESOPHAGOGASTRODUODENOSCOPY N/A 03/28/2018    Procedure: ESOPHAGOGASTRODUODENOSCOPY (EGD) with PEJ placement ;  Surgeon: Hari Del Valle MD;  [facility]: [facility]; Service: Gastroenterology   • ESOPHAGOGASTRODUODENOSCOPY N/A 04/05/2018    Procedure: ESOPHAGOGASTRODUODENOSCOPY (EGD) with botox injection and kaofed placement;  Surgeon: Rose Villar DO;  [facility]; Service: Gastroenterology   • ESOPHAGOGASTRODUODENOSCOPY N/A 04/10/2018    Procedure: ESOPHAGOGASTRODUODENOSCOPY (EGD) with Kaofed placement;  Surgeon: Lucy Gao MD;  [facility]; Service: Gastroenterology   • ESOPHAGOSCOPY WITH STENT INSERTION N/A 01/24/2018    Procedure: INSERTION STENT ESOPHAGEAL;  Surgeon: Tressa Wilcox MD;  [facility]; Service: Gastroenterology   • EYE SURGERY Bilateral 1980    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)    • FL RETROGRADE PYELOGRAM  06/24/2020   • FL RETROGRADE PYELOGRAM  08/21/2021   • FL RETROGRADE PYELOGRAM  5/4/2023   • GASTRIC BYPASS  12/19/2016    Lap sleeve gastrectomy w/shunt length shortening procedure   • HIATAL HERNIA REPAIR     • HYSTERECTOMY  03/14/2013   • IR LUMBAR PUNCTURE  08/29/2018   • LAPAROTOMY N/A 01/25/2018    Procedure: Exploratory Laparotomy, wash out,placement of drains, placement of NG feeding tube ;   Surgeon: Devin Bobo DO;  [facility];  Service: General   • LUMBAR PERITONEAL SHUNT  11/19/2015    Laparoscopic assisted   • MD BREAST REDUCTION Bilateral 03/25/2022    Procedure: BILATERAL BREAST REDUCTION;  Surgeon: Damaris Haro MD;  [facility];  Service: Plastics   • MD CRTJ SHUNT [id number] Right " 12/18/2019    Procedure: IMAGE GUIDED INSERTION OF RIGHT CORONAL VENTRICULAR-ATRIAL SHUNT;  Surgeon: Kai Sorenson MD;  [facility];  Service: Neurosurgery   • KY CRTJ SHUNT [id number] TERMINUS Right 05/31/2017    Procedure: IMAGE GUIDED CORONAL PLACEMENT OF PROGRAMABLE VENTRICULAR-PERITONEAL SHUNT, REMOVAL OF LP SHUNT ;  Surgeon: Kai Sorenson MD;  [facility];  Service: Neurosurgery   • KY CYSTO BLADDER W/URETERAL CATHETERIZATION N/A 06/06/2020    Procedure: Bilateral CYSTOSCOPY RETROGRADE PYELOGRAM WITH INSERTION STENT URETERAL;  Surgeon: Vickie Watts MD;  Location: [facility];  Service: Urology   • KY CYSTO BLADDER W/URETERAL CATHETERIZATION Bilateral 5/4/2023    Procedure: CYSTOSCOPY RETROGRADE PYELOGRAM WITH INSERTION STENT URETERAL;  Surgeon: Cory Diane MD;  [facility];  Service: Urology   • KY CYSTO/URETERO W/LITHOTRIPSY &INDWELL STENT INSRT Bilateral 06/24/2020    Procedure: CYSTOSCOPY URETEROSCOPY WITH LITHOTRIPSY HOLMIUM LASER, RETROGRADE PYELOGRAM AND exchange bilateral  STENTs URETERAL;  Surgeon: Miranda Walsh MD;  [facility];  Service: Urology   • KY CYSTO/URETERO W/LITHOTRIPSY &INDWELL STENT INSRT Left 08/21/2021    Procedure: CYSTOSCOPY; LEFT URETEROSCOPY WITH RETROGRADE PYELOGRAM, REMOVAL OF STONE AND INSERTION LEFT URETERAL STENT;  Surgeon: Sarah Landon MD;  [facility];  Service: Urology   • KY EGD TRANSORAL BIOPSY SINGLE/MULTIPLE N/A 06/27/2018    Procedure: ESOPHAGOGASTRODUODENOSCOPY (EGD) with padlock clip placement;  Surgeon: Maureen Womack MD;  [facility];   Service: Bariatrics   • KY RMVL COMPL CSF SHUNT SYSTEM W/O RPLCMT SHUNT Right 02/05/2018    Procedure: Removal of  shunt;  Surgeon: Kai Sorenson MD;  [facility];  Service: Neurosurgery   • KY RPLCMT/REVJ CSF SHUNT VALVE/CATH SHUNT SYS Right 01/25/2018    Procedure: Externalization of right-sided SHUNT VENTRICULAR-PERITONEAL in anterior chest wall ribs two and three level  ;  Surgeon: Olesya Amaya MD;  [facility];  Service: Neurosurgery   • REDUCTION MAMMAPLASTY Bilateral    • WRIST SURGERY Right     x3 2006, 2008     SOCIAL & FAMILY HISTORY     Social History     Substance and Sexual Activity   Alcohol Use Never     Substance and Sexual Activity   Alcohol Use Never        Substance and Sexual Activity   Drug Use Yes   • Frequency: 7 0 times per week   • Types: Marijuana    Comment: medical marijuana, vaping & topical      Social History     Tobacco Use   Smoking Status Former   • Packs/day: 0 50   • Years: 20 00   • Pack years: 10 00   • Types: Cigarettes   • Quit date: 8/24/2012   • Years since quitting: 10 7   Smokeless Tobacco Never     Family History   Problem Relation Age of Onset   • Kidney cancer Mother 77   • No Known Problems Father    • No Known Problems Sister    • No Known Problems Daughter    • No Known Problems Maternal Grandmother    • Colon cancer Maternal Grandfather 44   • No Known Problems Paternal Grandmother    • Cancer Paternal Grandfather    • Thyroid cancer Brother 36   • No Known Problems Maternal Aunt    • No Known Problems Maternal Aunt    • No Known Problems Paternal Aunt    • Cancer Family         Gastric   • Leukemia Family    • Colon cancer Family    • Pancreatic cancer Family    • Lung cancer Maternal Uncle 64     LABORATORY DATA     Labs: I have personally reviewed pertinent reports      Results from last 7 days   Lab Units 05/16/23  1341   WBC Thousand/uL 7 09   HEMOGLOBIN g/dL 15 1   HEMATOCRIT % 45 0   PLATELETS Thousands/uL 290   NEUTROS PCT % 70   MONOS PCT % 7      Results from last 7 days   Lab Units 05/16/23  1341   POTASSIUM mmol/L 4 1   CHLORIDE mmol/L 110*   CO2 mmol/L 27   BUN mg/dL 19   CREATININE mg/dL 0 67   CALCIUM mg/dL 8 9   ALK PHOS U/L 53   ALT U/L 15   AST U/L 8                          Micro:  Lab Results   Component Value Date    URINECX No Growth <1000 cfu/mL 05/11/2023 URINECX 50,000-59,000 cfu/ml Escherichia coli (A) 05/04/2023    URINECX <10,000 cfu/ml Escherichia coli (A) 05/04/2023    WOUNDCULT No growth 02/05/2018    WOUNDCULT 2+ Growth of Enterococcus faecalis (A) 01/25/2018     IMAGING & DIAGNOSTIC TESTS     Imaging: I have personally reviewed pertinent reports  No results found  EKG, Pathology, and Other Studies: I have personally reviewed pertinent reports  ALLERGIES     Allergies   Allergen Reactions   • Benadryl [Diphenhydramine] Anaphylaxis     Throat closing   • Phenergan [Promethazine] Anaphylaxis     MEDICATIONS PRIOR TO ARRIVAL     Prior to Admission medications    Medication Sig Start Date End Date Taking?  Authorizing Provider   ascorbic acid (VITAMIN C) 250 MG CHEW Chew 250 mg daily    Historical Provider, MD   Biotin 1 MG CAPS Take 1 mg by mouth daily      Historical Provider, MD   calcium citrate-vitamin D (CITRACAL+D) 315-200 MG-UNIT per tablet Take 1 tablet by mouth 2 (two) times a day    Historical Provider, MD   famotidine (PEPCID) 40 MG tablet take 1 tablet by mouth once daily 2/18/23   Mervat Maddox MD   ferrous gluconate 324 (37 5 Fe) mg Take 324 mg by mouth 2 (two) times a day before meals    Historical Provider, MD   folic acid (FOLVITE) 1 mg tablet Take 2 tablets (2 mg total) by mouth daily 2/9/23   ABNRE Caceres   HYDROcodone-acetaminophen (Norco) 5-325 mg per tablet Take 1 tablet by mouth every 6 (six) hours as needed for pain Max Daily Amount: 4 tablets 5/15/23   ABNER Carrillo   ibuprofen (MOTRIN) 100 mg/5 mL suspension Take 30 mL (600 mg total) by mouth every 6 (six) hours as needed for mild pain 5/11/23   ABNER Carrillo   Multiple Vitamin (MULTIVITAMIN) tablet Take 1 tablet by mouth daily Wears a patch    Historical Provider, MD   Multiple Vitamins-Minerals (Hair/Skin/Nails) CAPS Take 1 tablet by mouth 2 (two) times a day      Historical Provider, MD   omeprazole (PriLOSEC) 40 MG capsule take 1 capsule by mouth twice a day 4/2/23   Pan Carrasco MD   ondansetron (ZOFRAN-ODT) 4 mg disintegrating tablet Take 1 tablet (4 mg total) by mouth every 6 (six) hours as needed for nausea or vomiting 5/11/23   ABNER Ferguson   oxybutynin [facility]) 5 MG/5ML syrup Take 5 mL (5 mg total) by mouth 2 (two) times a day 5/11/23   ABNER Ferguson   phenazopyridine (PYRIDIUM) 200 mg tablet Take 1 tablet (200 mg total) by mouth 3 (three) times a day with meals 5/8/23   ABNER Ga   tamsulosin [facility]) 0 4 mg Take 1 capsule (0 4 mg total) by mouth daily with dinner 5/7/23 6/6/23  ABNER Rapp   vitamin B-12 (VITAMIN B-12) 500 mcg tablet Take 500 mcg by mouth daily    Historical Provider, MD     MEDICATIONS ADMINISTERED IN LAST 24 HOURS     Medication Administration - last 24 hours from 05/15/2023 1757 to 05/16/2023 1757       Date/Time Order Dose Route Action Action by     05/16/2023 1441 EDT multi-electrolyte (ISOLYTE-S PH 7 4) bolus 1,000 mL 0 mL Intravenous Stopped Bisi Moreno RN     05/16/2023 1341 EDT multi-electrolyte (ISOLYTE-S PH 7 4) bolus 1,000 mL 1,000 mL Intravenous [address] Bisi Moreno RN     05/16/2023 1338 EDT ondansetron (ZOFRAN) injection 4 mg 4 mg Intravenous Given Bisi Moreno RN     05/16/2023 1338 EDT HYDROmorphone (DILAUDID) injection 0 5 mg 0 5 mg Intravenous Given Bisi Moreno RN     05/16/2023 1607 EDT HYDROmorphone (DILAUDID) injection 0 5 mg 0 5 mg Intravenous Given Luis Patel RN     05/16/2023 1700 EDT multi-electrolyte (PLASMALYTE-A/ISOLYTE-S PH 7 4) IV solution 125 mL/hr Intravenous [address] Bisi Moreno RN     05/16/2023 1658 EDT ondansetron (ZOFRAN) injection 4 mg 4 mg Intravenous Given Bisi Moreno, RN        CURRENT MEDICATIONS     Current Facility-Administered Medications   Medication Dose Route Frequency Provider Last Rate   • [START ON 5/17/2023] vitamin B-12  500 mcg Oral Daily Jaxon Graham MD     • [START ON 5/17/2023] famotidine  20 "mg Oral Daily Jeninfer Stephens MD     • [START ON 5/58/8391] folic acid  2 mg Oral Daily Emily Desir MD     • ketorolac  15 mg Intravenous Q6H PRN Rhea Desir MD     • multi-electrolyte  125 mL/hr Intravenous Continuous Emily Desir  mL/hr (05/16/23 1700)   • ondansetron  4 mg Intravenous Q6H PRN Emily Desir MD     • oxyCODONE  5 mg Oral Q6H PRN Emily Desir MD     • [START ON 5/17/2023] pantoprazole  40 mg Oral Early Morning Emily Desir MD     • tamsulosin  0 4 mg Oral Daily With [facility] MD Thang       multi-electrolyte, 125 mL/hr, Last Rate: 125 mL/hr (05/16/23 1700)      ketorolac, 15 mg, Q6H PRN  ondansetron, 4 mg, Q6H PRN  oxyCODONE, 5 mg, Q6H PRN        Admission Time  I spent 20 minutes admitting the patient  This involved direct patient contact where I performed a full history and physical, reviewing previous records, and reviewing laboratory and other diagnostic studies  Portions of the record may have been created with voice recognition software  Occasional wrong word or \"sound a like\" substitutions may have occurred due to the inherent limitations of voice recognition software    Read the chart carefully and recognize, using context, where substitutions have occurred     ==  [facility]  Internal Medicine Residency PGY-***    "

## 2023-05-16 NOTE — ED PROCEDURE NOTE
Procedure  POC Renal US    Date/Time: 5/16/2023 1:59 PM  Performed by: Kala Lopez MD  Authorized by: Kala Lopez MD     Patient location:  ED  Procedure details:     Exam Type:  Diagnostic    Indications: flank/back pain      Assessment for:  Suspected hydronephrosis    Views obtained: left kidney and right kidney      Image quality: diagnostic      Image availability:  Images available in PACS  Findings:     LEFT kidney findings: unremarkable      LEFT hydronephrosis: mild (grade 1)      RIGHT hydronephrosis: none    Interpretation:     Renal ultrasound impressions: hydronephrosis    POC AAA US    Date/Time: 5/16/2023 2:01 PM  Performed by: Kala Lopez MD  Authorized by: Kala Lopez MD     Patient location:  ED  Performing Provider:  Resident  Procedure details:     Exam Type:  Diagnostic    Indications: abdominal pain and flank pain      Views Obtained:  Transverse mid view and transverse distal view    Image quality: diagnostic      Image availability:  Images available in PACS  Findings:     Abdominal Aorta Findings: normal      Aneurysm (if present): no abdominal aortic aneurysm    Interpretation:     Aortic ultrasound impression: aorta normal                       Kala Lopez MD  05/16/23 1400

## 2023-05-16 NOTE — LETTER
[address] [facility] MED SURG 7  [address] [place] [address]  Dept: [phone]    May 18, 2023     Patient: Charla Wallace   YOB: 1975   Date of Visit: 5/16/2023       To Whom it May Concern:    Ascencion Jeffery is under my professional care  She was seen in the hospital from 5/16/2023 to 05/18/23  Please excuse patient from work from 5/3 through 5/23 as she was being managed by our health care team for medical conditions which required additional hospitalization and surgical intervention  She may return to work without limitations pending follow up office visit  If you have any questions or concerns, please don't hesitate to call           Sincerely,          Arian Elliott, DO

## 2023-05-17 ENCOUNTER — ANESTHESIA EVENT (INPATIENT)
Dept: PERIOP | Facility: HOSPITAL | Age: 48
End: 2023-05-17

## 2023-05-17 ENCOUNTER — ANESTHESIA (INPATIENT)
Dept: PERIOP | Facility: HOSPITAL | Age: 48
End: 2023-05-17

## 2023-05-17 LAB
ANION GAP SERPL CALCULATED.3IONS-SCNC: 0 MMOL/L (ref 4–13)
B-HCG SERPL-ACNC: <2 MIU/ML
BASOPHILS # BLD AUTO: 0.05 THOUSANDS/ÂΜL (ref 0–0.1)
BASOPHILS NFR BLD AUTO: 1 % (ref 0–1)
BUN SERPL-MCNC: 17 MG/DL (ref 5–25)
CALCIUM SERPL-MCNC: 8.4 MG/DL (ref 8.3–10.1)
CHLORIDE SERPL-SCNC: 108 MMOL/L (ref 96–108)
CO2 SERPL-SCNC: 27 MMOL/L (ref 21–32)
CREAT SERPL-MCNC: 0.62 MG/DL (ref 0.6–1.3)
EOSINOPHIL # BLD AUTO: 0.22 THOUSAND/ÂΜL (ref 0–0.61)
EOSINOPHIL NFR BLD AUTO: 4 % (ref 0–6)
ERYTHROCYTE [DISTWIDTH] IN BLOOD BY AUTOMATED COUNT: 13.2 % (ref 11.6–15.1)
GFR SERPL CREATININE-BSD FRML MDRD: 106 ML/MIN/1.73SQ M
GLUCOSE SERPL-MCNC: 91 MG/DL (ref 65–140)
HCT VFR BLD AUTO: 41.2 % (ref 34.8–46.1)
HGB BLD-MCNC: 13.8 G/DL (ref 11.5–15.4)
IMM GRANULOCYTES # BLD AUTO: 0.03 THOUSAND/UL (ref 0–0.2)
IMM GRANULOCYTES NFR BLD AUTO: 1 % (ref 0–2)
LYMPHOCYTES # BLD AUTO: 1.48 THOUSANDS/ÂΜL (ref 0.6–4.47)
LYMPHOCYTES NFR BLD AUTO: 28 % (ref 14–44)
MCH RBC QN AUTO: 29.7 PG (ref 26.8–34.3)
MCHC RBC AUTO-ENTMCNC: 33.5 G/DL (ref 31.4–37.4)
MCV RBC AUTO: 89 FL (ref 82–98)
MONOCYTES # BLD AUTO: 0.41 THOUSAND/ÂΜL (ref 0.17–1.22)
MONOCYTES NFR BLD AUTO: 8 % (ref 4–12)
NEUTROPHILS # BLD AUTO: 3.05 THOUSANDS/ÂΜL (ref 1.85–7.62)
NEUTS SEG NFR BLD AUTO: 58 % (ref 43–75)
NRBC BLD AUTO-RTO: 0 /100 WBCS
PLATELET # BLD AUTO: 230 THOUSANDS/UL (ref 149–390)
PMV BLD AUTO: 9.4 FL (ref 8.9–12.7)
POTASSIUM SERPL-SCNC: 3.8 MMOL/L (ref 3.5–5.3)
RBC # BLD AUTO: 4.65 MILLION/UL (ref 3.81–5.12)
SODIUM SERPL-SCNC: 135 MMOL/L (ref 135–147)
WBC # BLD AUTO: 5.24 THOUSAND/UL (ref 4.31–10.16)

## 2023-05-17 RX ORDER — SCOLOPAMINE TRANSDERMAL SYSTEM 1 MG/1
1 PATCH, EXTENDED RELEASE TRANSDERMAL
Status: DISCONTINUED | OUTPATIENT
Start: 2023-05-17 | End: 2023-05-18 | Stop reason: HOSPADM

## 2023-05-17 RX ORDER — AMOXICILLIN 250 MG
1 CAPSULE ORAL
Status: DISCONTINUED | OUTPATIENT
Start: 2023-05-17 | End: 2023-05-18 | Stop reason: HOSPADM

## 2023-05-17 RX ORDER — HYDROMORPHONE HCL/PF 1 MG/ML
0.5 SYRINGE (ML) INJECTION ONCE
Status: COMPLETED | OUTPATIENT
Start: 2023-05-17 | End: 2023-05-17

## 2023-05-17 RX ORDER — POLYETHYLENE GLYCOL 3350 17 G/17G
17 POWDER, FOR SOLUTION ORAL DAILY
Status: DISCONTINUED | OUTPATIENT
Start: 2023-05-18 | End: 2023-05-18 | Stop reason: HOSPADM

## 2023-05-17 RX ADMIN — ONDANSETRON 4 MG: 2 INJECTION INTRAMUSCULAR; INTRAVENOUS at 05:34

## 2023-05-17 RX ADMIN — SENNOSIDES AND DOCUSATE SODIUM 1 TABLET: 8.6; 5 TABLET ORAL at 21:18

## 2023-05-17 RX ADMIN — SCOPALAMINE 1 PATCH: 1 PATCH, EXTENDED RELEASE TRANSDERMAL at 21:18

## 2023-05-17 RX ADMIN — FOLIC ACID 2 MG: 1 TABLET ORAL at 08:42

## 2023-05-17 RX ADMIN — SODIUM CHLORIDE, SODIUM GLUCONATE, SODIUM ACETATE, POTASSIUM CHLORIDE, MAGNESIUM CHLORIDE, SODIUM PHOSPHATE, DIBASIC, AND POTASSIUM PHOSPHATE 125 ML/HR: .53; .5; .37; .037; .03; .012; .00082 INJECTION, SOLUTION INTRAVENOUS at 17:22

## 2023-05-17 RX ADMIN — CYANOCOBALAMIN TAB 500 MCG 500 MCG: 500 TAB at 08:42

## 2023-05-17 RX ADMIN — ONDANSETRON 4 MG: 2 INJECTION INTRAMUSCULAR; INTRAVENOUS at 21:53

## 2023-05-17 RX ADMIN — PANTOPRAZOLE SODIUM 40 MG: 40 TABLET, DELAYED RELEASE ORAL at 05:12

## 2023-05-17 RX ADMIN — CEFTRIAXONE SODIUM 1000 MG: 10 INJECTION, POWDER, FOR SOLUTION INTRAVENOUS at 12:27

## 2023-05-17 RX ADMIN — ONDANSETRON 4 MG: 2 INJECTION INTRAMUSCULAR; INTRAVENOUS at 15:42

## 2023-05-17 RX ADMIN — FAMOTIDINE 20 MG: 20 TABLET, FILM COATED ORAL at 08:42

## 2023-05-17 RX ADMIN — HYDROMORPHONE HYDROCHLORIDE 0.5 MG: 1 INJECTION, SOLUTION INTRAMUSCULAR; INTRAVENOUS; SUBCUTANEOUS at 18:24

## 2023-05-17 RX ADMIN — HYDROMORPHONE HYDROCHLORIDE 0.5 MG: 1 INJECTION, SOLUTION INTRAMUSCULAR; INTRAVENOUS; SUBCUTANEOUS at 05:30

## 2023-05-17 RX ADMIN — TAMSULOSIN HYDROCHLORIDE 0.4 MG: 0.4 CAPSULE ORAL at 15:42

## 2023-05-17 RX ADMIN — OXYCODONE HYDROCHLORIDE 5 MG: 5 TABLET ORAL at 17:18

## 2023-05-17 NOTE — PROGRESS NOTES
INTERNAL MEDICINE RESIDENCY PROGRESS NOTE     Name: Mariana Arreaga   Age & Sex: 49 y o  female   MRN: [id number]  Unit/Bed#: -01   Encounter: [id number]  Team: SOD Team C     PATIENT INFORMATION     Name: Mariana Arreaga   Age & Sex: 49 y o  female   MRN: [id number]  Hospital Stay Days: 1    ASSESSMENT/PLAN     Principal Problem:    Obstruction of left ureteropelvic junction (UPJ) due to stone  Active Problems:    Pseudotumor cerebri    Migraine with aura and without status migrainosus, not intractable      * Obstruction of left ureteropelvic junction (UPJ) due to stone  Assessment & Plan  Patient initially presented to [facility] 5/3 with concerns of left flank pain, CT stone study at that time showed obstructing 7mm stone in the left ureteropelvic junction, bilateral non-obstructing renal collecting system calculi, and a 3x4mm non-obstructing right ureteral stone that at the time was asymptomatic  Urine culture at that time positive for E  Coli  Patient completed 10 days of to antibiotics (ceftriaxone and ciprofloxacin)  Status post cystoscopy, bilateral ureteroscopy with laser lithotripsy, retrograde pyelogram and insertion of bilateral ureteral stents that admission  Was to follow up with urology outpatient for staged ureteroscopy procedure but presented [facility] ED for worsening bilateral flank pain  Plan:  · Urology consult, pending possible OR 5/17  · N p o  midnight  · Pain regiment  · IV fluids 125ml/hr  · Patient appears nontoxic denies UTI symptoms, will hold off on antibiotics  · BP soft 80-90s/60s, s/p 1 dose IV CTX 5/17    Migraine with aura and without status migrainosus, not intractable  Assessment & Plan  Follows with neurology  On eptinezumab infusions every 3 months (last infusion 4/26/23) for preventative therapy and Fioricet PRN for abortive  Plan:  · Can consider Fioricet if necessary while inpatient       Pseudotumor cerebri  Assessment & Plan  S/p right Ventriculoatrial shunt  Follows with neurology  See plan below for migraines  Disposition: Pending urologic procedure     SUBJECTIVE     Patient seen and examined  No acute events overnight  Patient states she is feeling tired however the dilaudid does control her pain  Denies other complaints such as fevers/chills, shortness of breath, chest pain; ROS otherwise negative  Patient states she has a 20 year history of stones, having as many as 30-35 stones over her lifetime  She follows outpatient Urology  OBJECTIVE     Vitals:    23 2232 23 2335 23 0025 23 0710   BP: 99/58 (!) 87/48 100/57 (!) 88/62   BP Location: Left arm      Pulse: 65 (!) 51 (!) 52 60   Resp: 18   16   Temp:  98 °F (36 7 °C)  97 7 °F (36 5 °C)   TempSrc:       SpO2: 95% 97% 95% 95%      Temperature:   Temp (24hrs), Av 9 °F (36 6 °C), Min:97 7 °F (36 5 °C), Max:98 °F (36 7 °C)    Temperature: 97 7 °F (36 5 °C)  Intake & Output:  I/O       05/15 0701   0700  0701   0700  0701   0700    I V   2000     Total Intake  2000     Net  +2000                Weights: There is no height or weight on file to calculate BMI  Weight (last 2 days)     None        Physical Exam  Constitutional:       General: She is not in acute distress  HENT:      Nose: Nose normal    Eyes:      Conjunctiva/sclera: Conjunctivae normal    Cardiovascular:      Rate and Rhythm: Normal rate and regular rhythm  Pulmonary:      Effort: Pulmonary effort is normal  No respiratory distress  Breath sounds: Normal breath sounds  Abdominal:      Palpations: Abdomen is soft  Musculoskeletal:      Right lower leg: No edema  Left lower leg: No edema  Skin:     General: Skin is warm and dry  Neurological:      Mental Status: She is alert and oriented to person, place, and time  Psychiatric:         Mood and Affect: Mood normal          Behavior: Behavior normal        LABORATORY DATA     Labs:  I have personally "reviewed pertinent reports  Results from last 7 days   Lab Units 05/17/23  0527 05/16/23  1341   WBC Thousand/uL 5 24 7 09   HEMOGLOBIN g/dL 13 8 15 1   HEMATOCRIT % 41 2 45 0   PLATELETS Thousands/uL 230 290   NEUTROS PCT % 58 70   MONOS PCT % 8 7      Results from last 7 days   Lab Units 05/17/23  0527 05/16/23  1341   POTASSIUM mmol/L 3 8 4 1   CHLORIDE mmol/L 108 110*   CO2 mmol/L 27 27   BUN mg/dL 17 19   CREATININE mg/dL 0 62 0 67   CALCIUM mg/dL 8 4 8 9   ALK PHOS U/L  --  53   ALT U/L  --  15   AST U/L  --  8                            IMAGING & DIAGNOSTIC TESTING     Radiology Results: I have personally reviewed pertinent reports  No results found  Other Diagnostic Testing: I have personally reviewed pertinent reports  ACTIVE MEDICATIONS     Current Facility-Administered Medications   Medication Dose Route Frequency   • cefTRIAXone (ROCEPHIN) 1,000 mg in dextrose 5 % 50 mL IVPB  1,000 mg Intravenous Once   • cyanocobalamin (VITAMIN B-12) tablet 500 mcg  500 mcg Oral Daily   • famotidine (PEPCID) tablet 20 mg  20 mg Oral Daily   • folic acid (FOLVITE) tablet 2 mg  2 mg Oral Daily   • ketorolac (TORADOL) injection 15 mg  15 mg Intravenous Q6H PRN   • multi-electrolyte (PLASMALYTE-A/ISOLYTE-S PH 7 4) IV solution  125 mL/hr Intravenous Continuous   • ondansetron (ZOFRAN) injection 4 mg  4 mg Intravenous Q6H PRN   • oxyCODONE (ROXICODONE) IR tablet 5 mg  5 mg Oral Q6H PRN   • pantoprazole (PROTONIX) EC tablet 40 mg  40 mg Oral Early Morning   • [START ON 5/18/2023] polyethylene glycol (MIRALAX) packet 17 g  17 g Oral Daily   • senna-docusate sodium (SENOKOT S) 8 6-50 mg per tablet 1 tablet  1 tablet Oral HS   • tamsulosin (FLOMAX) capsule 0 4 mg  0 4 mg Oral Daily With Dinner       VTE Pharmacologic Prophylaxis:Held preop  VTE Mechanical Prophylaxis: sequential compression device    Portions of the record may have been created with voice recognition software    Occasional wrong word or \"sound a like\" " substitutions may have occurred due to the inherent limitations of voice recognition software    Read the chart carefully and recognize, using context, where substitutions have occurred   ==  Sameera [address], [facility]  Internal Medicine Residency PGY-1

## 2023-05-17 NOTE — CONSULTS
Inpatient consult to Urology  Consult performed by: Jesús Martinez PA-C  Consult ordered by: Liv Rogers MD      : UROLOGY  Eve Briseno 49 y o  female [id number]   Unit/Bed #: -01  Encounter: [id number]        Assessment  & Plan  :    Nephrolithiasis:  -Status post bilateral stent insertion for bilateral stones 7 mm on the left and 3 x 4 mm on the right on 5/4   -Severe uncontrolled pain on outpatient patient admitted for pain control  -Urology will plan for bilateral ureteroscopy and lithotripsy for removal of stones   -Discussed cystoscopy ureteroscopy homing laser lithotripsy basket extraction ureteral stent insertion bilaterally in depth  Discussed risk factors including bleeding, infection, damage nearby structures such as kidney ureter and bladder and need for additional stone procedures in the setting of infection  Discussed what to expect postoperatively including frequency, urgency, gross hematuria, flank pain with urination  Discussed possibility of indwelling ureteral stent with a string without a string  Given patient's intolerance of ureteral stents at this time most likely will place stent on string however will be at surgeon discretion   -No leukocytosis  -Creatinine at baseline  -Patient afebrile  -Noted to be hypotensive today, no additional signs of infection  -Urine culture on 5/11 negative for infection  -Consent signed at bedside, proceed to the OR      Subjective :    Eve Briseno  is a 49 y o  female well-known to our practice for nephrolithiasis has undergone surgery procedures in the past for ureteral stones  History of gastric sleeve  presented to [facility] on 5/4 and discovered to have bilateral obstructing stones requiring hospitalization x4 days status post bilateral stent insertion with Dr Todd Needs  Patient seen in the office and continued to experience severe pain with plan to fast-track on outpatient    However patient's pain was so severe she presented to the emergency room for evaluation  KUB reveals ureteral stents in place with cluster of stones projecting along the proximal aspect of the left ureteral stent  Creatinine at baseline, no leukocytosis hemoglobin stable, afebrile although noted to be hypotensive this a m  patient reevaluated this a m  reports that her pain is approximately 5 out of 10  Located in her bilateral flank and abdomen  Reports nausea and vomiting  Denies any fevers or chills  She reports that since her hospitalization she has been unable to work due to severe pain  She works as a cake   She has not followed up with a nephrologist on outpatient has been experiencing stones for over 20 years  Denies any prior metabolic work-up          Allergies   Allergen Reactions   • Benadryl [Diphenhydramine] Anaphylaxis     Throat closing   • Phenergan [Promethazine] Anaphylaxis      Current Outpatient Medications   Medication Instructions   • ascorbic acid (VITAMIN C) 250 mg, Oral, Daily   • Biotin 1 mg, Oral, Daily   • calcium citrate-vitamin D (CITRACAL+D) 315-200 MG-UNIT per tablet 1 tablet, Oral, 2 times daily   • famotidine (PEPCID) 40 MG tablet take 1 tablet by mouth once daily   • ferrous gluconate 324 mg, Oral, 2 times daily before meals   • folic acid (FOLVITE) 2 mg, Oral, Daily   • HYDROcodone-acetaminophen (Norco) 5-325 mg per tablet 1 tablet, Oral, Every 6 hours PRN   • ibuprofen (MOTRIN) 600 mg, Oral, Every 6 hours PRN   • Multiple Vitamin (MULTIVITAMIN) tablet 1 tablet, Oral, Daily, Wears a patch   • Multiple Vitamins-Minerals (Hair/Skin/Nails) CAPS 1 tablet, Oral, 2 times daily   • omeprazole (PriLOSEC) 40 MG capsule take 1 capsule by mouth twice a day   • ondansetron (ZOFRAN-ODT) 4 mg, Oral, Every 6 hours PRN   • oxybutynin (DITROPAN) 5 mg, Oral, 2 times daily   • phenazopyridine (PYRIDIUM) 200 mg, Oral, 3 times daily with meals   • tamsulosin (FLOMAX) 0 4 mg, Oral, Daily with dinner   • vitamin B-12 (VITAMIN B-12) 500 mcg, Oral, Daily      Past Medical History:   Diagnosis Date   • Abdominal pain    • Acute cystitis with hematuria 1/3/2020   • Brain condition     Pseudotumor Cerebri    • Chronic kidney disease    • COVID-19 virus infection 11/10/2022   • De Quervain's disease (tenosynovitis)    • Esophageal perforation    • Gastric leak    • GERD (gastroesophageal reflux disease)    • Headache    • Idiopathic intracranial hypertension    • Kidney stone    • Migraine    • No blood products     per pt: personal and Voodoo beliefs  surgeon office aware 12/13/19   • Papilledema, both eyes    • PONV (postoperative nausea and vomiting)    • Postgastrectomy malabsorption    • Presence of lumboperitoneal shunt     Resolved: Sep 20, 2017   • Rotator cuff tendinitis     Resolved: Aug 23, 2017   • Visual field defect      Past Surgical History:   Procedure Laterality Date   • BREAST BIOPSY Left 1993    benign   • CSF SHUNT      LP shunt - 2015 -  shunt - 2017   • ESOPHAGOGASTRODUODENOSCOPY N/A 02/23/2018    Procedure: ESOPHAGOGASTRODUODENOSCOPY (EGD) WITH ESOPHAGEAL STENT PLACEMENT;  Surgeon: Camron Zarco MD;  [facility];  Service: Thoracic   • ESOPHAGOGASTRODUODENOSCOPY N/A 02/23/2018    Procedure: ESOPHAGOGASTRODUODENOSCOPY (EGD) WITH REMOVAL ESOPHAGEAL STENT  AND REPLACEMENT WITH 23mm X155mm [address] [place] [address],[address];  Surgeon: Camron Zarco MD;  [facility];  Service: Thoracic   • ESOPHAGOGASTRODUODENOSCOPY N/A 03/28/2018    Procedure: ESOPHAGOGASTRODUODENOSCOPY (EGD) with PEJ placement ;  Surgeon: Malaika Louie MD;  Location: [facility]; Service: Gastroenterology   • ESOPHAGOGASTRODUODENOSCOPY N/A 04/05/2018    Procedure: ESOPHAGOGASTRODUODENOSCOPY (EGD) with botox injection and kaofed placement;  Surgeon: Angela Rodríguez DO;  [facility]: [facility];   Service: Gastroenterology   • ESOPHAGOGASTRODUODENOSCOPY N/A 04/10/2018    Procedure: ESOPHAGOGASTRODUODENOSCOPY (EGD) with Kaylah Chen placement;  Surgeon: Cristy Allison "Molina Uriarte MD;  [facility]; Service: Gastroenterology   • ESOPHAGOSCOPY WITH STENT INSERTION N/A 01/24/2018    Procedure: INSERTION STENT ESOPHAGEAL;  Surgeon: Beryle Mall, MD;  [facility]: [facility]; Service: Gastroenterology   • EYE SURGERY Bilateral 1980    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)    • FL RETROGRADE PYELOGRAM  06/24/2020   • FL RETROGRADE PYELOGRAM  08/21/2021   • FL RETROGRADE PYELOGRAM  5/4/2023   • GASTRIC BYPASS  12/19/2016    Lap sleeve gastrectomy w/shunt length shortening procedure   • HIATAL HERNIA REPAIR     • HYSTERECTOMY  03/14/2013   • IR LUMBAR PUNCTURE  08/29/2018   • LAPAROTOMY N/A 01/25/2018    Procedure: Exploratory Laparotomy, wash out,placement of drains, placement of NG feeding tube ;   Surgeon: Wilene Heimlich, DO;  [facility];  Service: General   • LUMBAR PERITONEAL SHUNT  11/19/2015    Laparoscopic assisted   • AR BREAST REDUCTION Bilateral 03/25/2022    Procedure: BILATERAL BREAST REDUCTION;  Surgeon: Chencho Silva MD;  [facility]: [facility];  Service: Plastics   • AR CRTJ SHUNT [id number] Right 12/18/2019    Procedure: IMAGE GUIDED INSERTION OF RIGHT CORONAL VENTRICULAR-ATRIAL SHUNT;  Surgeon: Tristen Lo MD;  [facility];  Service: Neurosurgery   • AR CRTJ SHUNT [id number] TERMINUS Right 05/31/2017    Procedure: IMAGE GUIDED CORONAL PLACEMENT OF PROGRAMABLE VENTRICULAR-PERITONEAL SHUNT, REMOVAL OF LP SHUNT ;  Surgeon: Tristen Lo MD;  [facility];  Service: Neurosurgery   • AR CYSTO BLADDER W/URETERAL CATHETERIZATION N/A 06/06/2020    Procedure: Bilateral CYSTOSCOPY RETROGRADE PYELOGRAM WITH INSERTION STENT URETERAL;  Surgeon: Earl Duarte MD;  Location: [facility];  Service: Urology   • AR CYSTO BLADDER W/URETERAL CATHETERIZATION Bilateral 5/4/2023    Procedure: CYSTOSCOPY RETROGRADE PYELOGRAM WITH INSERTION STENT URETERAL;  Surgeon: Thong Crawley MD;  Location: [facility];  Service: " Urology   • MO CYSTO/URETERO W/LITHOTRIPSY &INDWELL STENT INSRT Bilateral 06/24/2020    Procedure: CYSTOSCOPY URETEROSCOPY WITH LITHOTRIPSY HOLMIUM LASER, RETROGRADE PYELOGRAM AND exchange bilateral  STENTs URETERAL;  Surgeon: Amanda Velásquez MD;  [facility];  Service: Urology   • MO CYSTO/URETERO W/LITHOTRIPSY &INDWELL STENT INSRT Left 08/21/2021    Procedure: CYSTOSCOPY; LEFT URETEROSCOPY WITH RETROGRADE PYELOGRAM, REMOVAL OF STONE AND INSERTION LEFT URETERAL STENT;  Surgeon: Farida Peterson MD;  [facility];  Service: Urology   • MO EGD TRANSORAL BIOPSY SINGLE/MULTIPLE N/A 06/27/2018    Procedure: ESOPHAGOGASTRODUODENOSCOPY (EGD) with padlock clip placement;  Surgeon: Darling Howe MD;  [facility];   Service: Bariatrics   • MO RMVL COMPL CSF SHUNT SYSTEM W/O RPLCMT SHUNT Right 02/05/2018    Procedure: Removal of  shunt;  Surgeon: Tristen Lo MD;  [facility];  Service: Neurosurgery   • MO RPLCMT/REVJ CSF SHUNT VALVE/CATH SHUNT SYS Right 01/25/2018    Procedure: Externalization of right-sided SHUNT VENTRICULAR-PERITONEAL in anterior chest wall ribs two and three level  ;  Surgeon: Sindhu Reed MD;  [facility];  Service: Neurosurgery   • REDUCTION MAMMAPLASTY Bilateral    • WRIST SURGERY Right     x3 2006, 2008     Family History   Problem Relation Age of Onset   • Kidney cancer Mother 77   • No Known Problems Father    • No Known Problems Sister    • No Known Problems Daughter    • No Known Problems Maternal Grandmother    • Colon cancer Maternal Grandfather 44   • No Known Problems Paternal Grandmother    • Cancer Paternal Grandfather    • Thyroid cancer Brother 36   • No Known Problems Maternal Aunt    • No Known Problems Maternal Aunt    • No Known Problems Paternal Aunt    • Cancer Family         Gastric   • Leukemia Family    • Colon cancer Family    • Pancreatic cancer Family    • Lung cancer Maternal Uncle 64     Social History     Socioeconomic History   • Marital status: Single     Spouse name: None   • Number of children: 2   • Years of education: High School or GED    • Highest education level: None   Occupational History   • Occupation: employed    Tobacco Use   • Smoking status: Former     Packs/day: 0 50     Years: 20 00     Pack years: 10 00     Types: Cigarettes     Quit date: 8/24/2012     Years since quitting: 10 7   • Smokeless tobacco: Never   Vaping Use   • Vaping Use: Every day   • Substances: THC, CBD   Substance and Sexual Activity   • Alcohol use: Never   • Drug use: Yes     Frequency: 7 0 times per week     Types: Marijuana     Comment: medical marijuana, vaping & topical    • Sexual activity: Yes   Other Topics Concern   • None   Social History Narrative    ** Merged History Encounter **          Social Determinants of Health     Financial Resource Strain: Not on file   Food Insecurity: No Food Insecurity   • Worried About Running Out of Food in the Last Year: Never true   • Ran Out of Food in the Last Year: Never true   Transportation Needs: No Transportation Needs   • Lack of Transportation (Medical): No   • Lack of Transportation (Non-Medical): No   Physical Activity: Not on file   Stress: Not on file   Social Connections: Not on file   Intimate Partner Violence: Not on file   Housing Stability: Low Risk    • Unable to Pay for Housing in the Last Year: No   • Number of Places Lived in the Last Year: 1   • Unstable Housing in the Last Year: No        Review of Systems     Objective     Physical Exam  Constitutional:       General: She is not in acute distress  Appearance: She is normal weight  She is not ill-appearing, toxic-appearing or diaphoretic  HENT:      Right Ear: External ear normal       Left Ear: External ear normal       Nose: Nose normal       Mouth/Throat:      Pharynx: Oropharynx is clear  Eyes:      Conjunctiva/sclera: Conjunctivae normal    Cardiovascular:      Rate and Rhythm: Normal rate and regular rhythm  Pulses: Normal pulses  Heart sounds: No murmur heard  No friction rub  No gallop  Pulmonary:      Effort: Pulmonary effort is normal  No respiratory distress  Breath sounds: No wheezing, rhonchi or rales  Abdominal:      General: Bowel sounds are normal  There is no distension  Palpations: Abdomen is soft  Tenderness: There is abdominal tenderness  There is right CVA tenderness and left CVA tenderness  Musculoskeletal:         General: Normal range of motion  Cervical back: Normal range of motion  Skin:     General: Skin is warm and dry  Neurological:      General: No focal deficit present  Mental Status: She is alert and oriented to person, place, and time  Imaging:  CT ABDOMEN AND PELVIS WITHOUT IV CONTRAST - LOW DOSE RENAL STONE     INDICATION:   Flank pain, kidney stone suspected  left flank pain, likely stone      COMPARISON: March 23, 2022     TECHNIQUE:  Low radiation dose thin section CT examination of the abdomen and pelvis was performed without intravenous or oral contrast according to a protocol specifically designed to evaluate for urinary tract calculus  Axial, sagittal, and coronal 2D   reformatted images were created from the source data and submitted for interpretation  Evaluation for pathology in the abdomen and pelvis that is unrelated to urinary tract calculi is limited      Radiation dose length product (DLP) for this visit:  306 mGy-cm   This examination, like all CT scans performed in the [facility], was performed utilizing techniques to minimize radiation dose exposure, including the use of iterative   reconstruction and automated exposure control      URINARY TRACT FINDINGS:     RIGHT KIDNEY AND URETER: At least 4 nonobstructing collecting system calculi, largest measuring 5 mm  No hydronephrosis   There is a nonobstructing proximal right ureter stones measuring 3 x 4 mm image 2/89     LEFT KIDNEY AND URETER: At least 3 nonobstructing renal collecting system calculi, largest measuring 3 mm  Dilated extrarenal pelvis  Moderate hydronephrosis  Large obstructing calculus at the UPJ measuring up to 7 mm  Ureter below this level is   decompressed  Numerous pelvic phleboliths bilaterally      URINARY BLADDER:  Unremarkable         ADDITIONAL FINDINGS:     LOWER CHEST:  No clinically significant abnormality identified in the visualized lower chest      SOLID VISCERA: Limited low radiation dose noncontrast CT evaluation demonstrates no clinically significant abnormality of the imaged portions of the liver, spleen, pancreas, or adrenal glands      GALLBLADDER/BILIARY TREE:  No calcified gallstones  No pericholecystic inflammatory change  No biliary dilatation      STOMACH AND BOWEL: Limited evaluation of GI tract without oral contrast  Mild distal esophagitis  Hiatal hernia  Gastric sleeve bariatric surgery  Small bowel is average caliber  Large bowel unremarkable      APPENDIX:  No findings to suggest appendicitis      ABDOMINOPELVIC CAVITY:  No ascites  No pneumoperitoneum  No lymphadenopathy      REPRODUCTIVE ORGANS: Prior hysterectomy  No adnexal mass      ABDOMINAL WALL/INGUINAL REGIONS:  Unremarkable      OSSEOUS STRUCTURES:  No acute fracture or destructive osseous lesion         IMPRESSION:  There is an obstructing 7 mm stone in the left UPJ  Urology consultation advised      Bilateral nonobstructing renal collecting system calculi   In addition there is a nonobstructing right ureter stone measuring 3 x 4 mm which is apparently asymptomatic           Labs:  Lab Results   Component Value Date    SODIUM 135 05/17/2023    K 3 8 05/17/2023     05/17/2023    CO2 27 05/17/2023    BUN 17 05/17/2023    CREATININE 0 62 05/17/2023    GLUC 91 05/17/2023    CALCIUM 8 4 05/17/2023         Lab Results   Component Value Date    WBC 5 24 05/17/2023    HGB 13 8 05/17/2023    HCT 41 2 05/17/2023    MCV 89 05/17/2023     05/17/2023         VTE Pharmacologic Prophylaxis: Reason for no pharmacologic prophylaxis surgical intervention   VTE Mechanical Prophylaxis: sequential compression device     Suzie Calvo PA-C

## 2023-05-17 NOTE — OCCUPATIONAL THERAPY NOTE
Occupational Therapy Cancel Note        Patient Name: Blayne Jordan  SPNKT'C Date: 5/17/2023 05/17/23 0725   OT Last Visit   OT Visit Date 05/17/23   Note Type   Note type Evaluation; Cancelled Session   Cancel Reasons Other   Additional Comments Pt to OR tomorrow  OT orders were received and chart reviewed  Pt to OR tomorrow  OT will hold and continue to follow and see as medically appropriate and able       Arpita Hines OTR/L

## 2023-05-17 NOTE — PLAN OF CARE
Problem: PAIN - ADULT  Goal: Verbalizes/displays adequate comfort level or baseline comfort level  Description: Interventions:  - Encourage patient to monitor pain and request assistance  - Assess pain using appropriate pain scale  - Administer analgesics based on type and severity of pain and evaluate response  - Implement non-pharmacological measures as appropriate and evaluate response  - Consider cultural and social influences on pain and pain management  - Notify physician/advanced practitioner if interventions unsuccessful or patient reports new pain  Outcome: Progressing     Problem: INFECTION - ADULT  Goal: Absence or prevention of progression during hospitalization  Description: INTERVENTIONS:  - Assess and monitor for signs and symptoms of infection  - Monitor lab/diagnostic results  - Monitor all insertion sites, i e  indwelling lines, tubes, and drains  - Monitor endotracheal if appropriate and nasal secretions for changes in amount and color  - Mullin appropriate cooling/warming therapies per order  - Administer medications as ordered  - Instruct and encourage patient and family to use good hand hygiene technique  - Identify and instruct in appropriate isolation precautions for identified infection/condition  Outcome: Progressing  Goal: Absence of fever/infection during neutropenic period  Description: INTERVENTIONS:  - Monitor WBC    Outcome: Progressing     Problem: SAFETY ADULT  Goal: Patient will remain free of falls  Description: INTERVENTIONS:  - Educate patient/family on patient safety including physical limitations  - Instruct patient to call for assistance with activity   - Consult OT/PT to assist with strengthening/mobility   - Keep Call bell within reach  - Keep bed low and locked with side rails adjusted as appropriate  - Keep care items and personal belongings within reach  - Initiate and maintain comfort rounds  - Make Fall Risk Sign visible to staff  - Offer Toileting every   Hours, in advance of need  - Initiate/Maintain   alarm  - Obtain necessary fall risk management equipment:     - Apply yellow socks and bracelet for high fall risk patients  - Consider moving patient to room near nurses station  Outcome: Progressing  Goal: Maintain or return to baseline ADL function  Description: INTERVENTIONS:  -  Assess patient's ability to carry out ADLs; assess patient's baseline for ADL function and identify physical deficits which impact ability to perform ADLs (bathing, care of mouth/teeth, toileting, grooming, dressing, etc )  - Assess/evaluate cause of self-care deficits   - Assess range of motion  - Assess patient's mobility; develop plan if impaired  - Assess patient's need for assistive devices and provide as appropriate  - Encourage maximum independence but intervene and supervise when necessary  - Involve family in performance of ADLs  - Assess for home care needs following discharge   - Consider OT consult to assist with ADL evaluation and planning for discharge  - Provide patient education as appropriate  Outcome: Progressing  Goal: Maintains/Returns to pre admission functional level  Description: INTERVENTIONS:  - Perform BMAT or MOVE assessment daily    - Set and communicate daily mobility goal to care team and patient/family/caregiver  - Collaborate with rehabilitation services on mobility goals if consulted  - Perform Range of Motion    times a day  - Reposition patient every    hours    - Dangle patient    times a day  - Stand patient    times a day  - Ambulate patient    times a day  - Out of bed to chair    times a day   - Out of bed for meals      times a day  - Out of bed for toileting  - Record patient progress and toleration of activity level   Outcome: Progressing     Problem: DISCHARGE PLANNING  Goal: Discharge to home or other facility with appropriate resources  Description: INTERVENTIONS:  - Identify barriers to discharge w/patient and caregiver  - Arrange for needed discharge resources and transportation as appropriate  - Identify discharge learning needs (meds, wound care, etc )  - Arrange for interpretive services to assist at discharge as needed  - Refer to Case Management Department for coordinating discharge planning if the patient needs post-hospital services based on physician/advanced practitioner order or complex needs related to functional status, cognitive ability, or social support system  Outcome: Progressing     Problem: Knowledge Deficit  Goal: Patient/family/caregiver demonstrates understanding of disease process, treatment plan, medications, and discharge instructions  Description: Complete learning assessment and assess knowledge base    Interventions:  - Provide teaching at level of understanding  - Provide teaching via preferred learning methods  Outcome: Progressing

## 2023-05-17 NOTE — ANESTHESIA PREPROCEDURE EVALUATION
Procedure:  CYSTOSCOPY URETEROSCOPY WITH LITHOTRIPSY HOLMIUM LASER, RETROGRADE PYELOGRAM AND INSERTION STENT URETERAL (Bilateral: Bladder)    Relevant Problems   ANESTHESIA   (+) PONV (postoperative nausea and vomiting)      CARDIO   (+) Hypercholesteremia   (+) Migraine with aura and without status migrainosus, not intractable   (+) Murmur, cardiac      ENDO (within normal limits)      GI/HEPATIC   (+) Bariatric surgery status   (+) Gastroesophageal reflux disease      /RENAL   (+) Obstruction of left ureteropelvic junction (UPJ) due to stone   (+) Renal calculi      HEMATOLOGY   (+) Iron deficiency anemia secondary to inadequate dietary iron intake      NEURO/PSYCH   (+) Chronic tension-type headache, intractable   (+) History of idiopathic intracranial hypertension   (+) Migraine with aura and without status migrainosus, not intractable   (+) PTSD (post-traumatic stress disorder)      PULMONARY (within normal limits)      Digestive   (+) Postgastrectomy malabsorption      Nervous and Auditory   (+) Pseudotumor cerebri      Other   (+) S/P  shunt      EKG 1/3/2022:  Normal sinus rhythm  Right axis deviation  Nonspecific ST and T wave abnormality  Abnormal ECG  When compared with ECG of 03-JAN-2022 14:10, (unconfirmed)  No significant change was found    TTE 2018:  LEFT VENTRICLE:  Systolic function was normal  Ejection fraction was estimated to be 65 %  There were no regional wall motion abnormalities        Lab Results   Component Value Date    WBC 5 24 05/17/2023    HGB 13 8 05/17/2023    HCT 41 2 05/17/2023    MCV 89 05/17/2023     05/17/2023     Lab Results   Component Value Date    SODIUM 135 05/17/2023    K 3 8 05/17/2023     05/17/2023    CO2 27 05/17/2023    BUN 17 05/17/2023    CREATININE 0 62 05/17/2023    GLUC 91 05/17/2023    CALCIUM 8 4 05/17/2023     Lab Results   Component Value Date    INR 1 08 12/19/2019    INR 1 04 12/16/2019    INR 1 0 08/24/2018    PROTIME 13 6 12/19/2019 PROTIME 13 2 12/16/2019    PROTIME 10 4 08/24/2018     Lab Results   Component Value Date    HGBA1C 5 1 12/16/2019          Physical Exam    Airway    Mallampati score: II  TM Distance: >3 FB  Neck ROM: full     Dental       Cardiovascular  Cardiovascular exam normal    Pulmonary  Pulmonary exam normal     Other Findings        Anesthesia Plan  ASA Score- 2     Anesthesia Type- general with ASA Monitors  Additional Monitors:   Airway Plan: ETT  Comment: TIVA given history of PONV  Plan Factors-Exercise tolerance (METS): >4 METS  Chart reviewed  EKG reviewed  Existing labs reviewed  Patient summary reviewed  Induction- intravenous and rapid sequence induction  Postoperative Plan-   Planned trial extubation    Informed Consent- Anesthetic plan and risks discussed with patient  I personally reviewed this patient with the CRNA  Discussed and agreed on the Anesthesia Plan with the CRNA  Chelita Burger

## 2023-05-17 NOTE — QUICK NOTE
Unfortunately due to OR availability unable to perform procedure today  Will provide patient with diet today and postponed to tomorrow  Patient will be n p o  at midnight again tomorrow      Roxy Leyva PA-C

## 2023-05-17 NOTE — UTILIZATION REVIEW
Initial Clinical Review    Admission: Date/Time/Statement:   Admission Orders (From admission, onward)     Ordered        05/16/23 1601  INPATIENT ADMISSION  Once                      Orders Placed This Encounter   Procedures   • INPATIENT ADMISSION     Standing Status:   Standing     Number of Occurrences:   1     Order Specific Question:   Level of Care     Answer:   Med Surg [16]     Order Specific Question:   Estimated length of stay     Answer:   More than 2 Midnights     Order Specific Question:   Certification     Answer:   I certify that inpatient services are medically necessary for this patient for a duration of greater than two midnights  See H&P and MD Progress Notes for additional information about the patient's course of treatment  ED Arrival Information     Expected   -    Arrival   5/16/2023 12:29    Acuity   Urgent            Means of arrival   Walk-In    Escorted by   Self    Service   [facility]    Admission type   Emergency            Arrival complaint   kidney stones           Chief Complaint   Patient presents with   • Flank Pain     Pt has b/l kidney stones and b/l stents  10/10 pain x2 weeks unrelieved by zofran and prercocets  Initial Presentation: 49 y o  female to ED presents for bilateral flank pain and hematuria  Recently admit from 5/3 to 5/7 for left flank pain  CT renal study at that time showing obstructing 7 mm stone in the left ureteropelvic junction with bilateral nonobstructing renal collecting system calculi and a 3 x 4 mm nonobstructing right ureteral stone that was asymptomatic at that time  Urine cultures during that admission were positive for E  coli and patient completed 10 days of antibiotic treatment including ceftriaxone and oral ciprofloxacin  She underwent cystoscopy and bilateral ureteroscopy with laser lithotripsy and retrograde pyelogram and insertion of bilateral ureteral stents that admit   Pt was following with Urology outpt for eventual planned staged ureteroscopy procedure, however the patient's pain became unbearable  Some intermittent nausea  Pink/orange urine  PMH for pseudotumor cerebri status post  shunt complicated by intractable headaches following with neurology, lap sleeve gastrectomy in 2016, nephrolithiasis  Migraine with aura, follows with Neurology  On eptinezumab infusions every 3 months (last infusion 4/26/23) for preventative therapy and Fioricet PRN for abortive  Admit Inpatient level of care for Obstruction of left ureteropelvic junction (UPJ) due to stone  NPO at MN  Urology consult  IVFs  Pain regimen  Date: 5/17   Day 2:   Urology cons; S/p bilateral stent insertion for bilateral stones 7 mm on the left and 3 x 4 mm on the right on 5/4  Severe uncontrolled pain on outpt pt  Plan OR for today; bilateral ureteroscopy and lithotripsy for removal of stones  Noted to be hypotensive today  Progress notes; Tentative OR today  NPO MN and IVFs  Pain control  BP soft 80-90s/60s, s/p 1 dose IV CTX 5/17  Quick Note per Urology; Due to OR availability unable to perform procedure today  Plan for OR tomorrow 5/18  NPO at MN     5/18 OR - S/p CYSTOSCOPY URETEROSCOPY WITH LITHOTRIPSY HOLMIUM LASER  RETROGRADE PYELOGRAM  REMOVAL OF BILATERAL STENTS  AND INSERTION STENT URETERAL LEFT   BASKET EXTRACTION OF STONE LEFT SIDE  Operative Findings:  No right sided stone seen  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  Left proximal calculus lasered and retrieved  No additional left stones seen  Left ureteral stent replaced      ED Triage Vitals [05/16/23 1232]   Temperature Pulse Respirations Blood Pressure SpO2   97 9 °F (36 6 °C) 62 18 135/63 97 %      Temp Source Heart Rate Source Patient Position - Orthostatic VS BP Location FiO2 (%)   Temporal Monitor Sitting Left arm --      Pain Score       10 - Worst Possible Pain          Wt Readings from Last 1 Encounters:   05/11/23 63 kg (139 lb)     Additional Vital Signs:   05/17/23 07:10:14 97 7 °F (36 5 °C) 60 16 88/62   Abnormal  71 95 % -- --   05/17/23 00:25:36 -- 52   Abnormal  -- 100/57 71 95 % -- --   05/16/23 23:35:50 98 °F (36 7 °C) 51   Abnormal  -- 87/48   Abnormal  61   Abnormal  97 % None (Room air) --   05/16/23 2232 -- 65 18 99/58 -- 95 % None (Room air) Lying   05/16/23 2030 -- 56 -- 113/59 81 93 % -- --   05/16/23 1923 -- 55 16 132/69 -- 95 % None (Room air) Lying   05/16/23 1645 -- 52   Abnormal  18 122/66 88 96 % None (Room air)      Pertinent Labs/Diagnostic Test Results:   No orders to display         Results from last 7 days   Lab Units 05/17/23 0527 05/16/23  1341   WBC Thousand/uL 5 24 7 09   HEMOGLOBIN g/dL 13 8 15 1   HEMATOCRIT % 41 2 45 0   PLATELETS Thousands/uL 230 290   NEUTROS ABS Thousands/µL 3 05 5 03         Results from last 7 days   Lab Units 05/17/23 0527 05/16/23  1341   SODIUM mmol/L 135 136   POTASSIUM mmol/L 3 8 4 1   CHLORIDE mmol/L 108 110*   CO2 mmol/L 27 27   ANION GAP mmol/L 0* -1*   BUN mg/dL 17 19   CREATININE mg/dL 0 62 0 67   EGFR ml/min/1 73sq m 106 104   CALCIUM mg/dL 8 4 8 9     Results from last 7 days   Lab Units 05/16/23  1341   AST U/L 8   ALT U/L 15   ALK PHOS U/L 53   TOTAL PROTEIN g/dL 6 4   ALBUMIN g/dL 3 2*   TOTAL BILIRUBIN mg/dL 0 43         Results from last 7 days   Lab Units 05/17/23 0527 05/16/23  1341   GLUCOSE RANDOM mg/dL 91 102         Results from last 7 days   Lab Units 05/16/23  1641   CLARITY UA  Hazy   COLOR UA  Yellow   SPEC GRAV UA  1 018   PH UA  7 5   GLUCOSE UA mg/dl Negative   KETONES UA mg/dl Negative   BLOOD UA  Large*   PROTEIN UA mg/dl 50 (1+)*   NITRITE UA  Negative   BILIRUBIN UA  Negative   UROBILINOGEN UA (BE) mg/dl <2 0   LEUKOCYTES UA  Small*   WBC UA /hpf 30-50*   RBC UA /hpf Innumerable*   BACTERIA UA /hpf None Seen   EPITHELIAL CELLS WET PREP /hpf Moderate*   MUCUS THREADS  Innumerable*       Results from last 7 days   Lab Units 05/11/23  1615   URINE CULTURE  No Growth <1000 cfu/mL       ED Treatment:   Medication Administration from 05/16/2023 1229 to 05/16/2023 2327       Date/Time Order Dose Route Action     05/16/2023 1341 EDT multi-electrolyte (ISOLYTE-S PH 7 4) bolus 1,000 mL 1,000 mL Intravenous New Bag     05/16/2023 1338 EDT ondansetron (ZOFRAN) injection 4 mg 4 mg Intravenous Given     05/16/2023 1338 EDT HYDROmorphone (DILAUDID) injection 0 5 mg 0 5 mg Intravenous Given     05/16/2023 1607 EDT HYDROmorphone (DILAUDID) injection 0 5 mg 0 5 mg Intravenous Given     05/16/2023 1809 EDT tamsulosin (FLOMAX) capsule 0 4 mg 0 4 mg Oral Given     05/16/2023 1700 EDT multi-electrolyte (PLASMALYTE-A/ISOLYTE-S PH 7 4) IV solution 125 mL/hr Intravenous New Bag     05/16/2023 1658 EDT ondansetron (ZOFRAN) injection 4 mg 4 mg Intravenous Given     05/16/2023 2125 EDT ketorolac (TORADOL) injection 15 mg 15 mg Intravenous Given     05/16/2023 2240 EDT oxyCODONE (ROXICODONE) IR tablet 5 mg 5 mg Oral Given        Past Medical History:   Diagnosis Date   • Abdominal pain    • Acute cystitis with hematuria 1/3/2020   • Brain condition     Pseudotumor Cerebri    • Chronic kidney disease    • COVID-19 virus infection 11/10/2022   • De Quervain's disease (tenosynovitis)    • Esophageal perforation    • Gastric leak    • GERD (gastroesophageal reflux disease)    • Headache    • Idiopathic intracranial hypertension    • Kidney stone    • Migraine    • No blood products     per pt: personal and Sikhism beliefs   surgeon office aware 12/13/19   • Papilledema, both eyes    • PONV (postoperative nausea and vomiting)    • Postgastrectomy malabsorption    • Presence of lumboperitoneal shunt     Resolved: Sep 20, 2017   • Rotator cuff tendinitis     Resolved: Aug 23, 2017   • Visual field defect      Present on Admission:  • Migraine with aura and without status migrainosus, not intractable  • Obstruction of left ureteropelvic junction (UPJ) due to stone  • Pseudotumor cerebri      Admitting Diagnosis: Kidney stones [N20 0]  Nephrolithiasis [N20 0]  Renal calculi [N20 0]  Nausea [R11 0]  Flank pain [R10 9]  Obstruction of left ureteropelvic junction (UPJ) due to stone [N20 1]  Age/Sex: 49 y o  female     Admission Orders:  Scheduled Medications:  cefTRIAXone, 1,000 mg, Intravenous, Once  vitamin B-12, 500 mcg, Oral, Daily  famotidine, 20 mg, Oral, Daily  folic acid, 2 mg, Oral, Daily  pantoprazole, 40 mg, Oral, Early Morning  [START ON 5/18/2023] polyethylene glycol, 17 g, Oral, Daily  senna-docusate sodium, 1 tablet, Oral, HS  tamsulosin, 0 4 mg, Oral, Daily With Dinner      Continuous IV Infusions:  multi-electrolyte, 125 mL/hr, Intravenous, Continuous      PRN Meds:  ketorolac, 15 mg, Intravenous, Q6H PRN  ondansetron, 4 mg, Intravenous, Q6H PRN 5/17 x1  oxyCODONE, 5 mg, Oral, Q6H PRN        IP CONSULT TO UROLOGY    Network Utilization Review Department  ATTENTION: Please call with any questions or concerns to [phone] and carefully listen to the prompts so that you are directed to the right person  All voicemails are confidential   [facility] all requests for admission clinical reviews, approved or denied determinations and any other requests to dedicated fax number below belonging to the campus where the patient is receiving treatment   List of dedicated fax numbers for the Facilities:  [address] DENIALS (Administrative/Medical Necessity) [phone]   [address] (Maternity/NICU/Pediatrics) [phone]   [address] [phone]   [facility] [phone]   [facility] [phone]   [address] [address] [facility] [address] [address] [phone]

## 2023-05-18 ENCOUNTER — APPOINTMENT (INPATIENT)
Dept: RADIOLOGY | Facility: HOSPITAL | Age: 48
End: 2023-05-18

## 2023-05-18 VITALS
HEART RATE: 64 BPM | TEMPERATURE: 97.9 F | OXYGEN SATURATION: 97 % | RESPIRATION RATE: 15 BRPM | DIASTOLIC BLOOD PRESSURE: 58 MMHG | SYSTOLIC BLOOD PRESSURE: 99 MMHG

## 2023-05-18 LAB
ANION GAP SERPL CALCULATED.3IONS-SCNC: 3 MMOL/L (ref 4–13)
BACTERIA UR CULT: NORMAL
BASOPHILS # BLD AUTO: 0.05 THOUSANDS/ÂΜL (ref 0–0.1)
BASOPHILS NFR BLD AUTO: 1 % (ref 0–1)
BUN SERPL-MCNC: 12 MG/DL (ref 5–25)
CALCIUM SERPL-MCNC: 8.9 MG/DL (ref 8.3–10.1)
CHLORIDE SERPL-SCNC: 106 MMOL/L (ref 96–108)
CO2 SERPL-SCNC: 25 MMOL/L (ref 21–32)
CREAT SERPL-MCNC: 0.49 MG/DL (ref 0.6–1.3)
EOSINOPHIL # BLD AUTO: 0.3 THOUSAND/ÂΜL (ref 0–0.61)
EOSINOPHIL NFR BLD AUTO: 6 % (ref 0–6)
ERYTHROCYTE [DISTWIDTH] IN BLOOD BY AUTOMATED COUNT: 13 % (ref 11.6–15.1)
GFR SERPL CREATININE-BSD FRML MDRD: 115 ML/MIN/1.73SQ M
GLUCOSE SERPL-MCNC: 83 MG/DL (ref 65–140)
HCT VFR BLD AUTO: 42.4 % (ref 34.8–46.1)
HGB BLD-MCNC: 13.7 G/DL (ref 11.5–15.4)
IMM GRANULOCYTES # BLD AUTO: 0.04 THOUSAND/UL (ref 0–0.2)
IMM GRANULOCYTES NFR BLD AUTO: 1 % (ref 0–2)
LYMPHOCYTES # BLD AUTO: 1.36 THOUSANDS/ÂΜL (ref 0.6–4.47)
LYMPHOCYTES NFR BLD AUTO: 26 % (ref 14–44)
MCH RBC QN AUTO: 29 PG (ref 26.8–34.3)
MCHC RBC AUTO-ENTMCNC: 32.3 G/DL (ref 31.4–37.4)
MCV RBC AUTO: 90 FL (ref 82–98)
MONOCYTES # BLD AUTO: 0.41 THOUSAND/ÂΜL (ref 0.17–1.22)
MONOCYTES NFR BLD AUTO: 8 % (ref 4–12)
NEUTROPHILS # BLD AUTO: 3.15 THOUSANDS/ÂΜL (ref 1.85–7.62)
NEUTS SEG NFR BLD AUTO: 58 % (ref 43–75)
NRBC BLD AUTO-RTO: 0 /100 WBCS
PLATELET # BLD AUTO: 237 THOUSANDS/UL (ref 149–390)
PMV BLD AUTO: 9.6 FL (ref 8.9–12.7)
POTASSIUM SERPL-SCNC: 3.9 MMOL/L (ref 3.5–5.3)
RBC # BLD AUTO: 4.73 MILLION/UL (ref 3.81–5.12)
SODIUM SERPL-SCNC: 134 MMOL/L (ref 135–147)
WBC # BLD AUTO: 5.31 THOUSAND/UL (ref 4.31–10.16)

## 2023-05-18 PROCEDURE — BT1F1ZZ FLUOROSCOPY OF LEFT KIDNEY, URETER AND BLADDER USING LOW OSMOLAR CONTRAST: ICD-10-PCS | Performed by: UROLOGY

## 2023-05-18 PROCEDURE — 0WCR8ZZ EXTIRPATION OF MATTER FROM GENITOURINARY TRACT, VIA NATURAL OR ARTIFICIAL OPENING ENDOSCOPIC: ICD-10-PCS | Performed by: UROLOGY

## 2023-05-18 PROCEDURE — 0TP98DZ REMOVAL OF INTRALUMINAL DEVICE FROM URETER, VIA NATURAL OR ARTIFICIAL OPENING ENDOSCOPIC: ICD-10-PCS | Performed by: UROLOGY

## 2023-05-18 PROCEDURE — 0T778DZ DILATION OF LEFT URETER WITH INTRALUMINAL DEVICE, VIA NATURAL OR ARTIFICIAL OPENING ENDOSCOPIC: ICD-10-PCS | Performed by: UROLOGY

## 2023-05-18 RX ORDER — SODIUM CHLORIDE, SODIUM LACTATE, POTASSIUM CHLORIDE, CALCIUM CHLORIDE 600; 310; 30; 20 MG/100ML; MG/100ML; MG/100ML; MG/100ML
INJECTION, SOLUTION INTRAVENOUS CONTINUOUS PRN
Status: DISCONTINUED | OUTPATIENT
Start: 2023-05-18 | End: 2023-05-18

## 2023-05-18 RX ORDER — METOCLOPRAMIDE HYDROCHLORIDE 5 MG/ML
10 INJECTION INTRAMUSCULAR; INTRAVENOUS ONCE AS NEEDED
Status: DISCONTINUED | OUTPATIENT
Start: 2023-05-18 | End: 2023-05-18 | Stop reason: HOSPADM

## 2023-05-18 RX ORDER — PROPOFOL 10 MG/ML
INJECTION, EMULSION INTRAVENOUS CONTINUOUS PRN
Status: DISCONTINUED | OUTPATIENT
Start: 2023-05-18 | End: 2023-05-18

## 2023-05-18 RX ORDER — MAGNESIUM HYDROXIDE 1200 MG/15ML
LIQUID ORAL AS NEEDED
Status: DISCONTINUED | OUTPATIENT
Start: 2023-05-18 | End: 2023-05-18 | Stop reason: HOSPADM

## 2023-05-18 RX ORDER — ONDANSETRON 2 MG/ML
4 INJECTION INTRAMUSCULAR; INTRAVENOUS ONCE AS NEEDED
Status: DISCONTINUED | OUTPATIENT
Start: 2023-05-18 | End: 2023-05-18 | Stop reason: HOSPADM

## 2023-05-18 RX ORDER — DEXAMETHASONE SODIUM PHOSPHATE 10 MG/ML
INJECTION, SOLUTION INTRAMUSCULAR; INTRAVENOUS AS NEEDED
Status: DISCONTINUED | OUTPATIENT
Start: 2023-05-18 | End: 2023-05-18

## 2023-05-18 RX ORDER — LIDOCAINE HYDROCHLORIDE 10 MG/ML
INJECTION, SOLUTION EPIDURAL; INFILTRATION; INTRACAUDAL; PERINEURAL AS NEEDED
Status: DISCONTINUED | OUTPATIENT
Start: 2023-05-18 | End: 2023-05-18

## 2023-05-18 RX ORDER — MIDAZOLAM HYDROCHLORIDE 2 MG/2ML
INJECTION, SOLUTION INTRAMUSCULAR; INTRAVENOUS AS NEEDED
Status: DISCONTINUED | OUTPATIENT
Start: 2023-05-18 | End: 2023-05-18

## 2023-05-18 RX ORDER — FENTANYL CITRATE/PF 50 MCG/ML
50 SYRINGE (ML) INJECTION
Status: COMPLETED | OUTPATIENT
Start: 2023-05-18 | End: 2023-05-18

## 2023-05-18 RX ORDER — SUCCINYLCHOLINE/SOD CL,ISO/PF 100 MG/5ML
SYRINGE (ML) INTRAVENOUS AS NEEDED
Status: DISCONTINUED | OUTPATIENT
Start: 2023-05-18 | End: 2023-05-18

## 2023-05-18 RX ORDER — HYDROMORPHONE HCL/PF 1 MG/ML
0.5 SYRINGE (ML) INJECTION
Status: DISCONTINUED | OUTPATIENT
Start: 2023-05-18 | End: 2023-05-18 | Stop reason: HOSPADM

## 2023-05-18 RX ORDER — SODIUM CHLORIDE, SODIUM LACTATE, POTASSIUM CHLORIDE, CALCIUM CHLORIDE 600; 310; 30; 20 MG/100ML; MG/100ML; MG/100ML; MG/100ML
125 INJECTION, SOLUTION INTRAVENOUS CONTINUOUS
Status: DISCONTINUED | OUTPATIENT
Start: 2023-05-18 | End: 2023-05-18 | Stop reason: HOSPADM

## 2023-05-18 RX ORDER — CEFAZOLIN SODIUM 1 G/50ML
1000 SOLUTION INTRAVENOUS ONCE
Status: DISCONTINUED | OUTPATIENT
Start: 2023-05-18 | End: 2023-05-18 | Stop reason: HOSPADM

## 2023-05-18 RX ORDER — FENTANYL CITRATE 50 UG/ML
INJECTION, SOLUTION INTRAMUSCULAR; INTRAVENOUS AS NEEDED
Status: DISCONTINUED | OUTPATIENT
Start: 2023-05-18 | End: 2023-05-18

## 2023-05-18 RX ORDER — PROPOFOL 10 MG/ML
INJECTION, EMULSION INTRAVENOUS AS NEEDED
Status: DISCONTINUED | OUTPATIENT
Start: 2023-05-18 | End: 2023-05-18

## 2023-05-18 RX ORDER — FENTANYL CITRATE/PF 50 MCG/ML
25 SYRINGE (ML) INJECTION
Status: DISCONTINUED | OUTPATIENT
Start: 2023-05-18 | End: 2023-05-18 | Stop reason: HOSPADM

## 2023-05-18 RX ORDER — ONDANSETRON 2 MG/ML
INJECTION INTRAMUSCULAR; INTRAVENOUS AS NEEDED
Status: DISCONTINUED | OUTPATIENT
Start: 2023-05-18 | End: 2023-05-18

## 2023-05-18 RX ADMIN — FENTANYL CITRATE 25 MCG: 50 INJECTION INTRAMUSCULAR; INTRAVENOUS at 12:40

## 2023-05-18 RX ADMIN — HYDROMORPHONE HYDROCHLORIDE 0.5 MG: 1 INJECTION, SOLUTION INTRAMUSCULAR; INTRAVENOUS; SUBCUTANEOUS at 12:58

## 2023-05-18 RX ADMIN — HYDROMORPHONE HYDROCHLORIDE 0.5 MG: 1 INJECTION, SOLUTION INTRAMUSCULAR; INTRAVENOUS; SUBCUTANEOUS at 12:49

## 2023-05-18 RX ADMIN — FENTANYL CITRATE 50 MCG: 50 INJECTION, SOLUTION INTRAMUSCULAR; INTRAVENOUS at 11:12

## 2023-05-18 RX ADMIN — FENTANYL CITRATE 50 MCG: 50 INJECTION, SOLUTION INTRAMUSCULAR; INTRAVENOUS at 11:31

## 2023-05-18 RX ADMIN — SODIUM CHLORIDE, SODIUM LACTATE, POTASSIUM CHLORIDE, AND CALCIUM CHLORIDE: .6; .31; .03; .02 INJECTION, SOLUTION INTRAVENOUS at 11:06

## 2023-05-18 RX ADMIN — TAMSULOSIN HYDROCHLORIDE 0.4 MG: 0.4 CAPSULE ORAL at 17:23

## 2023-05-18 RX ADMIN — HYDROMORPHONE HYDROCHLORIDE 0.5 MG: 1 INJECTION, SOLUTION INTRAMUSCULAR; INTRAVENOUS; SUBCUTANEOUS at 12:43

## 2023-05-18 RX ADMIN — FENTANYL CITRATE 50 MCG: 50 INJECTION INTRAMUSCULAR; INTRAVENOUS at 12:30

## 2023-05-18 RX ADMIN — PROPOFOL 50 MG: 10 INJECTION, EMULSION INTRAVENOUS at 11:31

## 2023-05-18 RX ADMIN — PROPOFOL 150 MG: 10 INJECTION, EMULSION INTRAVENOUS at 11:12

## 2023-05-18 RX ADMIN — MIDAZOLAM 2 MG: 1 INJECTION INTRAMUSCULAR; INTRAVENOUS at 11:04

## 2023-05-18 RX ADMIN — FENTANYL CITRATE 50 MCG: 50 INJECTION INTRAMUSCULAR; INTRAVENOUS at 12:34

## 2023-05-18 RX ADMIN — SODIUM CHLORIDE, SODIUM LACTATE, POTASSIUM CHLORIDE, AND CALCIUM CHLORIDE 125 ML/HR: .6; .31; .03; .02 INJECTION, SOLUTION INTRAVENOUS at 12:25

## 2023-05-18 RX ADMIN — FAMOTIDINE 20 MG: 20 TABLET, FILM COATED ORAL at 08:44

## 2023-05-18 RX ADMIN — Medication 100 MG: at 11:13

## 2023-05-18 RX ADMIN — DEXAMETHASONE SODIUM PHOSPHATE 10 MG: 10 INJECTION, SOLUTION INTRAMUSCULAR; INTRAVENOUS at 11:13

## 2023-05-18 RX ADMIN — PROPOFOL 140 MCG/KG/MIN: 10 INJECTION, EMULSION INTRAVENOUS at 11:14

## 2023-05-18 RX ADMIN — PANTOPRAZOLE SODIUM 40 MG: 40 TABLET, DELAYED RELEASE ORAL at 05:14

## 2023-05-18 RX ADMIN — SODIUM CHLORIDE, SODIUM GLUCONATE, SODIUM ACETATE, POTASSIUM CHLORIDE, MAGNESIUM CHLORIDE, SODIUM PHOSPHATE, DIBASIC, AND POTASSIUM PHOSPHATE 125 ML/HR: .53; .5; .37; .037; .03; .012; .00082 INJECTION, SOLUTION INTRAVENOUS at 01:10

## 2023-05-18 RX ADMIN — FOLIC ACID 2 MG: 1 TABLET ORAL at 08:44

## 2023-05-18 RX ADMIN — ONDANSETRON 4 MG: 2 INJECTION INTRAMUSCULAR; INTRAVENOUS at 11:13

## 2023-05-18 RX ADMIN — CYANOCOBALAMIN TAB 500 MCG 500 MCG: 500 TAB at 08:44

## 2023-05-18 RX ADMIN — LIDOCAINE HYDROCHLORIDE 50 MG: 10 INJECTION, SOLUTION EPIDURAL; INFILTRATION; INTRACAUDAL; PERINEURAL at 11:12

## 2023-05-18 RX ADMIN — ONDANSETRON 4 MG: 2 INJECTION INTRAMUSCULAR; INTRAVENOUS at 05:14

## 2023-05-18 NOTE — DISCHARGE INSTR - AVS FIRST PAGE
1   Please follow-up with your PCP within 1 week regarding your recent hospitalization  2  Follow-up with urology  3  Please obtain lab work including CBC and BMP prior to your appointment with PCP in 1 week  4  Return to the hospital if you develop fever/chills, intractable pain, shortness of breath, chest pain      Per Urology:   A work note has been sent to your [employer] account for you may access this and provide this to your employer  If you require additional documentation our office will be able to provide this for you

## 2023-05-18 NOTE — OCCUPATIONAL THERAPY NOTE
Occupational Therapy Cancel        Patient Name: New GLYNN Date: 5/18/2023 05/18/23 0901   OT Last Visit   OT Visit Date 05/18/23   Note Type   Note type Cancelled Session; Evaluation   Cancel Reasons Patient to operating room     Robin MIRANDA, OTR/L

## 2023-05-18 NOTE — UTILIZATION REVIEW
NOTIFICATION OF INPATIENT ADMISSION   AUTHORIZATION REQUEST   SERVICING FACILITY:   [facility]  Address: [address] [place] [address], [address] [place], [place] [id number]  Tax ID: [id number]  NPI: [id number] ATTENDING PROVIDER:  Attending Name and NPI#: Berta Melo, Emilia Anjumas Reyes [[id number]]  Address: [address] [place] [address], [address] [place] [address]  Phone: [phone]   ADMISSION INFORMATION:  Place of Service: Inpatient [address] [facility]  [address]  Place of Service Code: 21  Inpatient Admission Date/Time: 5/16/23  4:01 PM  Discharge Date/Time: No discharge date for patient encounter  Admitting Diagnosis Code/Description:  Kidney stones [N20 0]  Nephrolithiasis [N20 0]  Renal calculi [N20 0]  Nausea [R11 0]  Flank pain [R10 9]  Obstruction of left ureteropelvic junction (UPJ) due to stone [N20 1]     UTILIZATION REVIEW CONTACT:  [employer] Utilization   Network Utilization Review Department  Phone: [phone]  Fax: [phone]  Email: [email or web address]  Contact for approvals/pending authorizations, clinical reviews, and discharge  PHYSICIAN ADVISORY SERVICES:  Medical Necessity Denial & Thre-we-Bsgx Review  Phone: [phone]  Fax: [phone]  Email: [email or web address]

## 2023-05-18 NOTE — ANESTHESIA POSTPROCEDURE EVALUATION
Post-Op Assessment Note    CV Status:  Stable  Pain Score: 0    Pain management: adequate     Mental Status:  Alert and sleepy   Hydration Status:  Euvolemic   PONV Controlled:  Controlled   Airway Patency:  Patent      Post Op Vitals Reviewed: Yes      Staff: CRNA         No notable events documented      BP   117/53   Temp      Pulse  64   Resp   12   SpO2   95

## 2023-05-18 NOTE — PROGRESS NOTES
INTERNAL MEDICINE RESIDENCY PROGRESS NOTE     Name: Blayne Jordan   Age & Sex: 49 y o  female   MRN: [id number]  Unit/Bed#: OR POOL   Encounter: [id number]  Team: SOD Team C     PATIENT INFORMATION     Name: Blayne Jordan   Age & Sex: 49 y o  female   MRN: [id number]  Hospital Stay Days: 2    ASSESSMENT/PLAN     Principal Problem:    Obstruction of left ureteropelvic junction (UPJ) due to stone  Active Problems:    Pseudotumor cerebri    Migraine with aura and without status migrainosus, not intractable    PONV (postoperative nausea and vomiting)      * Obstruction of left ureteropelvic junction (UPJ) due to stone  Assessment & Plan  Patient initially presented to [facility] 5/3 with concerns of left flank pain, CT stone study at that time showed obstructing 7mm stone in the left ureteropelvic junction, bilateral non-obstructing renal collecting system calculi, and a 3x4mm non-obstructing right ureteral stone that at the time was asymptomatic  Urine culture at that time positive for E  Coli  Patient completed 10 days of to antibiotics (ceftriaxone and ciprofloxacin)  Status post cystoscopy, bilateral ureteroscopy with laser lithotripsy, retrograde pyelogram and insertion of bilateral ureteral stents that admission  Was to follow up with urology outpatient for staged ureteroscopy procedure but presented to [facility] ED for worsening bilateral flank pain  Patient has a long complicated history of multiple renal stones  She did have gastric sleeve surgery which makes her prone to more stones; as well as was previously on Diamox  Work-up in the past has shown calcium oxalate stones, low urine calcium  Plan:  · Urology consult, OR 5/18  · N p o  midnight  · Pain regimen  · IV fluids 125ml/hr  · Patient appears nontoxic denies UTI symptoms  · BP soft 80-90s/60s, s/p 1 dose IV CTX 5/17    Migraine with aura and without status migrainosus, not intractable  Assessment & Plan  Follows with neurology   On eptinezumab infusions every 3 months (last infusion 23) for preventative therapy and Fioricet PRN for abortive  Plan:  · Can consider Fioricet if necessary while inpatient  Pseudotumor cerebri  Assessment & Plan  S/p right Ventriculoatrial shunt  Follows with neurology  See plan below for migraines  Disposition: Possible discharge later today, after OR with urology    SUBJECTIVE     Patient seen and examined  No acute events overnight  Patient states she is feeling more comfortable  She is concerned about her job as a   Denies fever/chills, chest pain, shortness of breath, weakness  ROS otherwise negative  OBJECTIVE     Vitals:    23 2208 23 0405 23 0822 23 1205   BP: 99/61  102/61 117/53   Pulse: 55 (!) 49 65 62   Resp:  16  21   Temp: 97 8 °F (36 6 °C)   98 5 °F (36 9 °C)   TempSrc:       SpO2: 95% 94% 96% 95%      Temperature:   Temp (24hrs), Av 1 °F (36 7 °C), Min:97 8 °F (36 6 °C), Max:98 5 °F (36 9 °C)    Temperature: 98 5 °F (36 9 °C)  Intake & Output:  I/O        0701   0700  0701   07 07 0700    P  O   0     I V   2662 5     Total Intake  2662 5     Net + +2662 5            Unmeasured Urine Occurrence  1 x         Weights: There is no height or weight on file to calculate BMI  Weight (last 2 days)     None        Physical Exam  Constitutional:       General: She is not in acute distress  HENT:      Head: Normocephalic and atraumatic  Nose: Nose normal       Mouth/Throat:      Mouth: Mucous membranes are moist    Eyes:      Conjunctiva/sclera: Conjunctivae normal    Cardiovascular:      Rate and Rhythm: Normal rate and regular rhythm  Pulmonary:      Effort: Pulmonary effort is normal  No respiratory distress  Abdominal:      Palpations: Abdomen is soft  Tenderness: There is no abdominal tenderness  Musculoskeletal:      Right lower leg: No edema        Left lower leg: No edema  Skin:     General: Skin is warm and dry  Neurological:      Mental Status: She is alert and oriented to person, place, and time  Psychiatric:         Mood and Affect: Mood normal          Behavior: Behavior normal        LABORATORY DATA     Labs: I have personally reviewed pertinent reports  Results from last 7 days   Lab Units 05/18/23 0526 05/17/23  0527 05/16/23  1341   WBC Thousand/uL 5 31 5 24 7 09   HEMOGLOBIN g/dL 13 7 13 8 15 1   HEMATOCRIT % 42 4 41 2 45 0   PLATELETS Thousands/uL 237 230 290   NEUTROS PCT % 58 58 70   MONOS PCT % 8 8 7      Results from last 7 days   Lab Units 05/18/23  0526 05/17/23  0527 05/16/23  1341   POTASSIUM mmol/L 3 9 3 8 4 1   CHLORIDE mmol/L 106 108 110*   CO2 mmol/L 25 27 27   BUN mg/dL 12 17 19   CREATININE mg/dL 0 49* 0 62 0 67   CALCIUM mg/dL 8 9 8 4 8 9   ALK PHOS U/L  --   --  53   ALT U/L  --   --  15   AST U/L  --   --  8                            IMAGING & DIAGNOSTIC TESTING     Radiology Results: I have personally reviewed pertinent reports  No results found  Other Diagnostic Testing: I have personally reviewed pertinent reports      ACTIVE MEDICATIONS     Current Facility-Administered Medications   Medication Dose Route Frequency   • ceFAZolin (ANCEF) IVPB (premix in dextrose) 1,000 mg 50 mL  1,000 mg Intravenous Once   • [MAR Hold] cyanocobalamin (VITAMIN B-12) tablet 500 mcg  500 mcg Oral Daily   • [MAR Hold] famotidine (PEPCID) tablet 20 mg  20 mg Oral Daily   • fentaNYL (SUBLIMAZE) injection 25 mcg  25 mcg Intravenous Q3 min PRN   • fentaNYL (SUBLIMAZE) injection 50 mcg  50 mcg Intravenous Q3 min PRN   • [MAR Hold] folic acid (FOLVITE) tablet 2 mg  2 mg Oral Daily   • HYDROmorphone (DILAUDID) injection 0 5 mg  0 5 mg Intravenous Q5 Min PRN   • metoclopramide (REGLAN) injection 10 mg  10 mg Intravenous Once PRN   • multi-electrolyte (PLASMALYTE-A/ISOLYTE-S PH 7 4) IV solution  125 mL/hr Intravenous Continuous   • [MAR Hold] ondansetron (ZOFRAN) "injection 4 mg  4 mg Intravenous Q6H PRN   • ondansetron (ZOFRAN) injection 4 mg  4 mg Intravenous Once PRN   • [MAR Hold] oxyCODONE (ROXICODONE) IR tablet 5 mg  5 mg Oral Q6H PRN   • [MAR Hold] pantoprazole (PROTONIX) EC tablet 40 mg  40 mg Oral Early Morning   • [MAR Hold] polyethylene glycol (MIRALAX) packet 17 g  17 g Oral Daily   • [MAR Hold] scopolamine (TRANSDERM-SCOP) 1 mg/3 days TD 72 hr patch 1 patch  1 patch Transdermal Q72H   • [MAR Hold] senna-docusate sodium (SENOKOT S) 8 6-50 mg per tablet 1 tablet  1 tablet Oral HS   • [MAR Hold] tamsulosin (FLOMAX) capsule 0 4 mg  0 4 mg Oral Daily With Dinner       VTE Pharmacologic Prophylaxis: Held prior to urologic procedure  VTE Mechanical Prophylaxis: sequential compression device    Portions of the record may have been created with voice recognition software  Occasional wrong word or \"sound a like\" substitutions may have occurred due to the inherent limitations of voice recognition software    Read the chart carefully and recognize, using context, where substitutions have occurred   ==  Sameera Crocker MD  [facility]  Internal Medicine Residency PGY-1       "

## 2023-05-18 NOTE — OP NOTE
OPERATIVE REPORT  PATIENT NAME: Sarah Perea    :  1975  MRN: [id number]  Pt Location: [facility] CYSTO ROOM 01    SURGERY DATE: 2023    Surgeon(s) and Role:     * Malena Mcgregor MD - Primary    Preop Diagnosis:  Renal calculi [N20 0]  Obstruction of left ureteropelvic junction (UPJ) due to stone [N20 1]    Post-Op Diagnosis Codes:     * Renal calculi [N20 0]     * Obstruction of left ureteropelvic junction (UPJ) due to stone [N20 1]    Procedure(s):  CYSTOSCOPY URETEROSCOPY WITH LITHOTRIPSY HOLMIUM LASER  RETROGRADE PYELOGRAM  REMOVAL OF BILATERAL STENTS  AND INSERTION STENT URETERAL LEFT   BASKET EXTRACTION OF STONE LEFT SIDE    Specimen(s):  ID Type Source Tests Collected by Time Destination   A : left kidney stone  Calculus Kidney, Left STONE ANALYSIS Malena Mcgregor MD 2023 1142        Estimated Blood Loss:   Minimal    Drains:  Ureteral Internal Stent Left ureter 6 Fr  (Active)   Number of days: 14       Ureteral Internal Stent Right ureter 6 Fr  (Active)   Number of days: 14       Anesthesia Type:   General    Operative Indications:  Renal calculi [N20 0]  Obstruction of left ureteropelvic junction (UPJ) due to stone [N20 1]      Operative Findings:  No right sided stone seen  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  Left proximal calculus lasered and retrieved  No additional left stones seen  Left ureteral stent replaced  Complications:   None    Procedure and Technique:  Patient was identified, brought to the operating room, and placed on table in supine position  After induction of general anesthesia she was placed in the dorsolithotomy position and prepped and draped in the usual sterile fashion  A formal timeout was performed  25 [place]iqu[place] flexible cystoscope was placed  Bilateral stents were seen  The right stent was grasped with a grasper and brought out through the meatus  A guidewire was placed into the right ureter    A long semirigid ureteroscope was passed alongside the wire   There was no stone seen within the ureter  I then placed a second guidewire followed by 5 3 [place] flexible ureteroscope  Carefully evaluated all calyces  There is no free-floating stone  There was some nephrocalcinosis noted portion of the mid and lower poles  I did not leave a stent  The left-sided stent was then grasped with a grasper and brought out the meatus  A guidewire was placed into the left ureter  A dual-lumen catheter was placed followed by a second guidewire and a ureteral access sheath  5 3 Kazakh flexible ureteroscope was placed and the stone was identified at the proximal ureter  It then retreated into the left kidney into a midpole calyx  Holmium laser fiber was placed and laser lithotripsy was performed until the stone was of suitable size  A [place] basket was placed and the fragment was retrieved  The rest of the kidney was evaluated and there were no further calculi noted  The scope and access sheath were removed carefully under vision to ensure no residual calculi  The wire was backloaded into the cystoscope  Retrograde pyelograms performed delineating the upper tract anatomy and confirming there is no filling defect  A new 6 [place] by 26 cm double-J stent with string was then placed into the left kidney  Patient tolerated the procedure well  I was present for the entire procedure  Patient Disposition:  PACU     Plan: Discharge home when stable  Remove left ureteral stent in office 5/22 or 23      SIGNATURE: Francisco Thakkar MD  DATE: May 18, 2023  TIME: 12:03 PM

## 2023-05-18 NOTE — PHYSICAL THERAPY NOTE
Physical Therapy Cancellation Note       05/18/23 0387   PT Last Visit   PT Visit Date 05/18/23   Note Type   Note type Cancelled Session; Evaluation   Cancel Reasons Patient to operating room       PT orders received, pt chart reviewed  Pt currently to OR for cytoscopy  PT to continue to follow and see pt as appropriate and able  Thank you       Yahaira Herbert, PT, DPT

## 2023-05-18 NOTE — DISCHARGE SUMMARY
INTERNAL MEDICINE RESIDENCY DISCHARGE SUMMARY     Catalina Saldivar   49 y o  female  MRN: [id number]  [facility]   Encounter: [id number]    Principal Problem:    Obstruction of left ureteropelvic junction (UPJ) due to stone  Active Problems:    Pseudotumor cerebri    Migraine with aura and without status migrainosus, not intractable    PONV (postoperative nausea and vomiting)      * Obstruction of left ureteropelvic junction (UPJ) due to stone  Assessment & Plan  Patient initially presented to [facility] 5/3 with concerns of left flank pain, CT stone study at that time showed obstructing 7mm stone in the left ureteropelvic junction, bilateral non-obstructing renal collecting system calculi, and a 3x4mm non-obstructing right ureteral stone that at the time was asymptomatic  Urine culture at that time positive for E  Coli  Patient completed 10 days of to antibiotics (ceftriaxone and ciprofloxacin)  Status post cystoscopy, bilateral ureteroscopy with laser lithotripsy, retrograde pyelogram and insertion of bilateral ureteral stents that admission  Was to follow up with urology outpatient for staged ureteroscopy procedure but presented to [facility] ED for worsening bilateral flank pain  Patient has a long complicated history of multiple renal stones  She did have gastric sleeve surgery which makes her prone to more stones; as well as was previously on Diamox  Work-up in the past has shown calcium oxalate stones, low urine calcium       Plan:  · Urology consult, OR 5/18  · Underwent cystoscopy with lithotripsy, bilateral stent removal, new left stent placement; Basket extraction  · Medications per urology on discharge  · Recommend outpatient stone work-up  · Follow-up with urology outpatient on 5/22/2023  · Patient appears nontoxic denies UTI symptoms and UA with mixed contaminants and therefore we will hold off on antibiotics at "discharge    Migraine with aura and without status migrainosus, not intractable  Assessment & Plan  Follows with neurology  On eptinezumab infusions every 3 months (last infusion 4/26/23) for preventative therapy and Fioricet PRN for abortive  Plan:  · Can consider Fioricet if necessary while inpatient  Pseudotumor cerebri  Assessment & Plan  S/p right Ventriculoatrial shunt  Follows with neurology  See plan below for migraines  [address] COURSE     Per initial H&P, \"Patient is a 60-year-old female past medical history of pseudotumor cerebri status post  shunt complicated by intractable headaches following with neurology, lap sleeve gastrectomy in 2016, nephrolithiasis, presenting with bilateral flank pain and hematuria      She was initially admitted 5/3/2023 to [address] [place] [address] with left flank pain, CT renal study at that time showing obstructing 7 mm stone in the left ureteropelvic junction with bilateral nonobstructing renal collecting system calculi and a 3 x 4 mm nonobstructing right ureteral stone that was asymptomatic at that time  Urine cultures during that admission were positive for E  coli and patient completed 10 days of antibiotic treatment including ceftriaxone and oral ciprofloxacin  She underwent cystoscopy and bilateral ureteroscopy with laser lithotripsy and retrograde pyelogram and insertion of bilateral ureteral stents that admission  She was following with urology outpatient for a eventual planned staged ureteroscopy procedure, however the patient's pain became unbearable and she presented to [facility] ED      In the ED, patient afebrile, HR 50s to 60s, RR 18, /66, SPO2 96 on RA  Labs showed WBC count of 7 09, hemoglobin of 15 1, creatinine of 0 67  UA obtained  Bedside ultrasound was performed in the ED and showed no abdominal aortic aneurysm  No other new imaging was performed    Urology was informed and patient will be an add-on for a potential " "surgical intervention tomorrow  \"     Rest of hospital course:  -5/17: No OR availability; patient's symptoms controlled with pain regimen  -5/18: Status post cystoscopy ureteroscopy, status post stent removal on the right with no stone visualized in the right kidney, s/p left renal calculi laser lithotripsy with stone extraction and left stent exchange with Dr Cheri Fay    -Urine culture with mixed contaminants    Follow-ups:  -Outpatient follow-up with urology on 5/22/2023 at which point they will remove the left stent  -Recommend repeat urine calcium 24-hour collection outpatient given 20+ year of recurrent renal calculi  -Follow-up stone analysis    New Medications on Discharge:   None     DISCHARGE INFORMATION     PCP at Discharge: Giuseppe Cueto MD    Admitting Provider: Berta Marinelli MD  Admission Date: 5/16/2023    Discharge Provider: Berta Marinelli MD  Discharge Date: 5/18/2023    Discharge Disposition: ED Dismiss - Never Arrived  Discharge Condition: stable  Discharge with Lines: no    Discharge Diet: regular diet  Activity Restrictions: none  Test Results Pending at Discharge: [facility] analysis    Discharge Diagnoses:  Principal Problem:    Obstruction of left ureteropelvic junction (UPJ) due to stone  Active Problems:    Pseudotumor cerebri    Migraine with aura and without status migrainosus, not intractable    PONV (postoperative nausea and vomiting)  Resolved Problems:    Chronic kidney disease      Consulting Providers:  1  Urology    Diagnostic & Therapeutic Procedures Performed:  No results found      Code Status: Level 1 - Full Code  Advance Directive & Living Will: <no information>  Power of :    POLST:      Medications:    Current Discharge Medication List        Current Discharge Medication List        Current Discharge Medication List        Current Discharge Medication List      CONTINUE these medications which have NOT CHANGED    Details   ascorbic acid (VITAMIN C) 250 MG CHEW Chew 250 " mg daily      Biotin 1 MG CAPS Take 1 mg by mouth daily        calcium citrate-vitamin D (CITRACAL+D) 315-200 MG-UNIT per tablet Take 1 tablet by mouth 2 (two) times a day      famotidine (PEPCID) 40 MG tablet take 1 tablet by mouth once daily  Qty: 30 tablet, Refills: 2    Associated Diagnoses: Gastroesophageal reflux disease without esophagitis      ferrous gluconate 324 (37 5 Fe) mg Take 324 mg by mouth 2 (two) times a day before meals      folic acid (FOLVITE) 1 mg tablet Take 2 tablets (2 mg total) by mouth daily  Qty: 180 tablet, Refills: 3    Comments: Dose increased to 2mg  Associated Diagnoses: Folic acid deficiency      HYDROcodone-acetaminophen (Norco) 5-325 mg per tablet Take 1 tablet by mouth every 6 (six) hours as needed for pain Max Daily Amount: 4 tablets  Qty: 15 tablet, Refills: 0    Associated Diagnoses: Nephrolithiasis      ibuprofen (MOTRIN) 100 mg/5 mL suspension Take 30 mL (600 mg total) by mouth every 6 (six) hours as needed for mild pain  Qty: 473 mL, Refills: 1    Associated Diagnoses: Nephrolithiasis      Multiple Vitamin (MULTIVITAMIN) tablet Take 1 tablet by mouth daily Wears a patch      Multiple Vitamins-Minerals (Hair/Skin/Nails) CAPS Take 1 tablet by mouth 2 (two) times a day        omeprazole (PriLOSEC) 40 MG capsule take 1 capsule by mouth twice a day  Qty: 60 capsule, Refills: 2    Associated Diagnoses: Gastroesophageal reflux disease, unspecified whether esophagitis present      ondansetron (ZOFRAN-ODT) 4 mg disintegrating tablet Take 1 tablet (4 mg total) by mouth every 6 (six) hours as needed for nausea or vomiting  Qty: 20 tablet, Refills: 1    Associated Diagnoses: Nephrolithiasis      oxybutynin (DITROPAN) 5 MG/5ML syrup Take 5 mL (5 mg total) by mouth 2 (two) times a day  Qty: 120 mL, Refills: 3    Associated Diagnoses: Nephrolithiasis      phenazopyridine (PYRIDIUM) 200 mg tablet Take 1 tablet (200 mg total) by mouth 3 (three) times a day with meals  Qty: 10 tablet, "Refills: 0    Associated Diagnoses: Nephrolithiasis      tamsulosin (FLOMAX) 0 4 mg Take 1 capsule (0 4 mg total) by mouth daily with dinner  Qty: 30 capsule, Refills: 0    Associated Diagnoses: Ureterolithiasis      vitamin B-12 (VITAMIN B-12) 500 mcg tablet Take 500 mcg by mouth daily             Allergies: Allergies   Allergen Reactions   • Benadryl [Diphenhydramine] Anaphylaxis     Throat closing   • Phenergan [Promethazine] Anaphylaxis       FOLLOW-UP     PCP Outpatient Follow-up:  1  Follow-up in 1 week with PCP    Consulting Providers Follow-up:  1  Follow-up with urology    Active Issues Requiring Follow-up:   1  Stone status post stenting    Discharge Statement:   I spent 45 minutes minutes discharging the patient  This time was spent on the day of discharge  I had direct contact with the patient on the day of discharge  Additional documentation is required if more than 30 minutes were spent on discharge  Portions of the record may have been created with voice recognition software  Occasional wrong word or \"sound a like\" substitutions may have occurred due to the inherent limitations of voice recognition software    Read the chart carefully and recognize, using context, where substitutions have occurred     ==  Hadley Espinosa, [facility]  Internal Medicine Resident PGY-1    "

## 2023-05-19 ENCOUNTER — TELEPHONE (OUTPATIENT)
Dept: INTERNAL MEDICINE CLINIC | Facility: OTHER | Age: 48
End: 2023-05-19

## 2023-05-19 ENCOUNTER — TELEPHONE (OUTPATIENT)
Dept: OTHER | Facility: OTHER | Age: 48
End: 2023-05-19

## 2023-05-19 ENCOUNTER — TRANSITIONAL CARE MANAGEMENT (OUTPATIENT)
Dept: INTERNAL MEDICINE CLINIC | Facility: OTHER | Age: 48
End: 2023-05-19

## 2023-05-19 NOTE — UTILIZATION REVIEW
NOTIFICATION OF ADMISSION DISCHARGE   This is a Notification of Discharge from [address]  Please be advised that this patient has been discharge from our facility  Below you will find the admission and discharge date and time including the patient’s disposition  UTILIZATION REVIEW CONTACT:  Deric Acosta  Utilization   Network Utilization Review Department  Phone: [phone] x carefully listen to the prompts  All voicemails are confidential   Email: [email or web address]     ADMISSION INFORMATION  PRESENTATION DATE: 5/16/2023 12:45 PM  OBERVATION ADMISSION DATE:   INPATIENT ADMISSION DATE: 5/16/23  4:01 PM   DISCHARGE DATE: 5/18/2023  6:31 PM   DISPOSITION:Home/Self Care    IMPORTANT INFORMATION:  Send all requests for admission clinical reviews, approved or denied determinations and any other requests to dedicated fax number below belonging to the campus where the patient is receiving treatment   List of dedicated fax numbers:  [address] DENIALS (Administrative/Medical Necessity) [phone]   [address] (Maternity/NICU/Pediatrics) [phone]   [facility] [phone]   [facility] [phone]   [address] [phone]   220 [facility] [phone] 90 [facility] [phone]   [address] [place] [address] 119 [phone]   [facility]  [phone]   [address] [facility] [phone] [address] [facility] [address] [facility] [phone]

## 2023-05-22 ENCOUNTER — PROCEDURE VISIT (OUTPATIENT)
Dept: UROLOGY | Facility: AMBULATORY SURGERY CENTER | Age: 48
End: 2023-05-22

## 2023-05-22 VITALS — WEIGHT: 138 LBS | BODY MASS INDEX: 24.45 KG/M2 | HEIGHT: 63 IN

## 2023-05-22 DIAGNOSIS — N20.0 NEPHROLITHIASIS: Primary | ICD-10-CM

## 2023-05-22 NOTE — PROGRESS NOTES
5/22/2023  Blayne Jordan is a 49 y o  female  [id number]        Diagnosis  Chief Complaint    Stent removal; Post-op       Patient is s/p CYSTOSCOPY URETEROSCOPY WITH LITHOTRIPSY HOLMIUM LASER, RETROGRADE PYELOGRAM, REMOVAL OF BILATERAL STENTS,  AND INSERTION STENT URETERAL LEFT ,BASKET EXTRACTION OF STONE LEFT SIDE (Bladder) on 5/18/2023 with Dr Agus Huerta  Patient will contact the office to schedule a follow up appointment  KUB order was placed and advised to have this performed 2 weeks prior to appointment  Educational handout provided and reviewed of s/s she may experience after stent removal  Advised to contact the office in the meantime with any questions or concerns  Procedure Stent with String Removal      Stent with string removed intact without difficulty  Reviewed post stent removal symptoms including flank pain, dysuria, and hematuria  Instructed patient to increase oral fluid intake  Encouraged the use of NSAIDS and other prescribed pain medication as needed for discomfort  Patient instructed to call the office or report to the ER for uncontrolled pain, fever, chills, nausea or vomiting             ALOK Akbar, RN

## 2023-05-24 LAB
CALCIUM OXALATE DIHYDRATE MFR STONE IR: 20 %
COLOR STONE: NORMAL
COM MFR STONE: 75 %
COMMENT-STONE3: NORMAL
COMPOSITION: NORMAL
HYDROXYAPATITE 24H ENGDIFF UR: 5 %
LABORATORY COMMENT REPORT: NORMAL
PHOTO: NORMAL
SIZE STONE: NORMAL MM
SPEC SOURCE SUBJ: NORMAL
STONE ANALYSIS-IMP: NORMAL
STONE ANALYSIS-IMP: NORMAL
WT STONE: 9 MG

## 2023-05-30 ENCOUNTER — OFFICE VISIT (OUTPATIENT)
Dept: INTERNAL MEDICINE CLINIC | Age: 48
End: 2023-05-30

## 2023-05-30 VITALS
TEMPERATURE: 98.7 F | SYSTOLIC BLOOD PRESSURE: 102 MMHG | OXYGEN SATURATION: 97 % | DIASTOLIC BLOOD PRESSURE: 56 MMHG | WEIGHT: 145 LBS | HEART RATE: 58 BPM | BODY MASS INDEX: 25.69 KG/M2

## 2023-05-30 DIAGNOSIS — K59.04 CHRONIC IDIOPATHIC CONSTIPATION: ICD-10-CM

## 2023-05-30 DIAGNOSIS — Z13.220 SCREENING CHOLESTEROL LEVEL: ICD-10-CM

## 2023-05-30 DIAGNOSIS — N39.46 MIXED INCONTINENCE: Primary | ICD-10-CM

## 2023-05-30 DIAGNOSIS — Z12.31 SCREENING MAMMOGRAM, ENCOUNTER FOR: ICD-10-CM

## 2023-05-30 DIAGNOSIS — N20.1 OBSTRUCTION OF LEFT URETEROPELVIC JUNCTION (UPJ) DUE TO STONE: ICD-10-CM

## 2023-05-30 PROBLEM — E44.0 MODERATE PROTEIN-CALORIE MALNUTRITION (HCC): Status: RESOLVED | Noted: 2018-02-21 | Resolved: 2023-05-30

## 2023-05-30 NOTE — PROGRESS NOTES
"Assessment/Plan:    {There are no diagnoses linked to this encounter  (Refresh or delete this SmartLink)}        There are no Patient Instructions on file for this visit  No follow-ups on file  Subjective:      Patient ID: Erin Cheema is a 49 y o  female  Chief Complaint   Patient presents with   • Transition of Care Management     TCM D/C 5/18/23 [facility] Obstruction of left uretopelvic junction-  BMI follow up NEEDED- patient states she will make her appointment for colonoscopy in the winter       HPI      From hospital discharge summary:    \"   * Obstruction of left ureteropelvic junction (UPJ) due to stone  Assessment & Plan  Patient initially presented to [facility] 5/3 with concerns of left flank pain, CT stone study at that time showed obstructing 7mm stone in the left ureteropelvic junction, bilateral non-obstructing renal collecting system calculi, and a 3x4mm non-obstructing right ureteral stone that at the time was asymptomatic  Urine culture at that time positive for E  Coli  Patient completed 10 days of to antibiotics (ceftriaxone and ciprofloxacin)  Status post cystoscopy, bilateral ureteroscopy with laser lithotripsy, retrograde pyelogram and insertion of bilateral ureteral stents that admission  Was to follow up with urology outpatient for staged ureteroscopy procedure but presented to [facility] ED for worsening bilateral flank pain       Patient has a long complicated history of multiple renal stones  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 Underwent cystoscopy with lithotripsy, bilateral stent removal, new left stent placement; Basket extraction  ? Medications per urology on discharge  ?  Recommend outpatient stone work-up  • Follow-up with urology outpatient on 5/22/2023  Patient appears nontoxic denies UTI " "symptoms and UA with mixed contaminants and therefore we will hold off on antibiotics at discharge\"        The following portions of the patient's history were reviewed and updated as appropriate: allergies, current medications, past family history, past medical history, past social history, past surgical history and problem list     Review of Systems      Constitutional:  Denies fever or chills   Eyes:  Denies double , blurry vision or eye pain  HENT:  Denies nasal congestion, sore throat or new hearing issues  Respiratory:  Denies cough or shortness of breath or wheezing  Cardiovascular:  Denies palpitations or chest pain  GI:  Denies abdominal pain, nausea, or vomiting, no loose stools, no reflux  Integument:  Denies rash , no open areas  Neurologic:  Denies headache or focal weakness, no dizziness  : no dysuria, or hematuria      Current Outpatient Medications   Medication Sig Dispense Refill   • ascorbic acid (VITAMIN C) 250 MG CHEW Chew 250 mg daily     • Biotin 1 MG CAPS Take 1 mg by mouth daily       • calcium citrate-vitamin D (CITRACAL+D) 315-200 MG-UNIT per tablet Take 1 tablet by mouth 2 (two) times a day     • famotidine (PEPCID) 40 MG tablet take 1 tablet by mouth once daily 30 tablet 2   • ferrous gluconate 324 (37 5 Fe) mg Take 324 mg by mouth 2 (two) times a day before meals     • folic acid (FOLVITE) 1 mg tablet Take 2 tablets (2 mg total) by mouth daily 180 tablet 3   • HYDROcodone-acetaminophen (Norco) 5-325 mg per tablet Take 1 tablet by mouth every 6 (six) hours as needed for pain Max Daily Amount: 4 tablets 15 tablet 0   • ibuprofen (MOTRIN) 100 mg/5 mL suspension Take 30 mL (600 mg total) by mouth every 6 (six) hours as needed for mild pain 473 mL 1   • Multiple Vitamin (MULTIVITAMIN) tablet Take 1 tablet by mouth daily Wears a patch     • Multiple Vitamins-Minerals (Hair/Skin/Nails) CAPS Take 1 tablet by mouth 2 (two) times a day       • omeprazole (PriLOSEC) 40 MG capsule take 1 capsule " by mouth twice a day 60 capsule 2   • vitamin B-12 (VITAMIN B-12) 500 mcg tablet Take 500 mcg by mouth daily     • ondansetron (ZOFRAN-ODT) 4 mg disintegrating tablet Take 1 tablet (4 mg total) by mouth every 6 (six) hours as needed for nausea or vomiting 20 tablet 1   • oxybutynin (DITROPAN) 5 MG/5ML syrup Take 5 mL (5 mg total) by mouth 2 (two) times a day 120 mL 3   • phenazopyridine (PYRIDIUM) 200 mg tablet Take 1 tablet (200 mg total) by mouth 3 (three) times a day with meals 10 tablet 0   • tamsulosin (FLOMAX) 0 4 mg Take 1 capsule (0 4 mg total) by mouth daily with dinner 30 capsule 0     No current facility-administered medications for this visit         Objective:    /56 (BP Location: Left arm, Patient Position: Sitting, Cuff Size: Standard)   Pulse 58   Temp 98 7 °F (37 1 °C) (Temporal)   Wt 65 8 kg (145 lb)   LMP  (LMP Unknown)   SpO2 97%   BMI 25 69 kg/m²        Physical Exam      Constitutional:  Well developed, well nourished, no acute distress, non-toxic appearance   Eyes:  PERRL, conjunctiva normal , non icteric sclera  HENT:  Atraumatic, oropharynx moist  Neck-  supple   Respiratory:  CTA b/l, normal breath sounds, no rales, no wheezing   Cardiovascular:  RRR, no murmurs, no LE edema b/l  GI:  Soft, nondistended, normal bowel sounds x 4, nontender, no organomegaly, no mass, no rebound, no guarding   Neurologic:  no focal deficits noted   Psychiatric:  Speech and behavior appropriate , AAO x 3        Jean Whitt DO

## 2023-05-30 NOTE — PROGRESS NOTES
Assessment & Plan     1  Mixed incontinence    2  Obstruction of left ureteropelvic junction (UPJ) due to stone    3  Screening cholesterol level  -     Lipid Panel with Direct LDL reflex; Future    4  Screening mammogram, encounter for  -     Mammo screening bilateral w 3d & cad; Future; Expected date: 03/04/2024    5  Chronic idiopathic constipation      BMI Counseling: Body mass index is 25 69 kg/m²  The BMI is above normal  Nutrition recommendations include moderation in carbohydrate intake  Exercise recommendations include exercising 3-5 times per week  No pharmacotherapy was ordered  Rationale for BMI follow-up plan is due to patient being overweight or obese  Subjective     Transitional Care Management Review:   Sarah Perea is a 49 y o  female here for TCM follow up  During the TCM phone call patient stated:  TCM Call     Date and time call was made  5/19/2023  8:24 AM    Hospital care reviewed  Records reviewed    Patient was hospitialized at  [facility]    Date of Admission  05/16/23    Date of discharge  05/18/23    Diagnosis  obstruction of left uretopelivc junction due to stone    Disposition  Home    Were the patients medications reviewed and updated  Yes    Current Symptoms  Neausea    Neausea severity  Mild      TCM Call     Post hospital issues  None    Should patient be enrolled in anticoag monitoring? No    Scheduled for follow up?   Yes    Patient refusal reason  pt will call once stents are removed    Patients specialists  Other (comment)    Other specialists names  [facility] 12/31/19    Misha Patino MD 1/16/20 weight management    Other specialists contcat #                                                                                        Did you obtain your prescribed medications  Yes    Do you need help managing your prescriptions or medications  No    Is transportation to your appointment needed  No "I have advised the patient to call PCP with any new or worsening symptoms  Dick Muñiz CMA        HPI   From hospital discharge summary:    \"   * Obstruction of left ureteropelvic junction (UPJ) due to stone  Assessment & Plan  Patient initially presented to [facility] 5/3 with concerns of left flank pain, CT stone study at that time showed obstructing 7mm stone in the left ureteropelvic junction, bilateral non-obstructing renal collecting system calculi, and a 3x4mm non-obstructing right ureteral stone that at the time was asymptomatic  Urine culture at that time positive for E  Coli  Patient completed 10 days of to antibiotics (ceftriaxone and ciprofloxacin)  Status post cystoscopy, bilateral ureteroscopy with laser lithotripsy, retrograde pyelogram and insertion of bilateral ureteral stents that admission  Was to follow up with urology outpatient for staged ureteroscopy procedure but presented to [facility] ED for worsening bilateral flank pain       Patient has a long complicated history of multiple renal stones  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 Underwent cystoscopy with lithotripsy, bilateral stent removal, new left stent placement; Basket extraction  ? Medications per urology on discharge  ? Recommend outpatient stone work-up  • Follow-up with urology outpatient on 5/22/2023  Patient appears nontoxic denies UTI symptoms and UA with mixed contaminants and therefore we will hold off on antibiotics at discharge\"        Update since being home: Patient is not having any urine issues, urine is clear and she is not having any pain  She is back to her normal routine  Her bowels however are very constipated with the pain medication that she did have    She has been using Dulcolax to help with her bowel routines and taking " a stool softener however she has not been having regular bowel movements on a daily basis        Review of Systems     Constitutional:  Denies fever or chills   Eyes:  Denies double , blurry vision or eye pain  HENT:  Denies nasal congestion, sore throat or new hearing issues  Respiratory:  Denies cough or shortness of breath or wheezing  Cardiovascular:  Denies palpitations or chest pain  GI:  Denies abdominal pain, nausea, or vomiting, no loose stools, no reflux  Integument:  Denies rash , no open areas  Neurologic:  Denies headache or focal weakness, no dizziness  : no dysuria, or hematuria        Objective     /56 (BP Location: Left arm, Patient Position: Sitting, Cuff Size: Standard)   Pulse 58   Temp 98 7 °F (37 1 °C) (Temporal)   Wt 65 8 kg (145 lb)   LMP  (LMP Unknown)   SpO2 97%   BMI 25 69 kg/m²      Physical Exam     Constitutional:  Well developed, well nourished, no acute distress, non-toxic appearance   Eyes:  PERRL, conjunctiva normal , non icteric sclera  HENT:  Atraumatic, oropharynx moist  Neck-  supple   Respiratory:  CTA b/l, normal breath sounds, no rales, no wheezing   Cardiovascular:  RRR, no murmurs, no LE edema b/l  GI:  Soft, nondistended, normal bowel sounds x 4, nontender, no organomegaly, no mass, no rebound, no guarding   Neurologic:  no focal deficits noted   Psychiatric:  Speech and behavior appropriate , AAO x 3        Medications have been reviewed by provider in current encounter    Franca Zacarias DO

## 2023-06-03 DIAGNOSIS — K21.9 GASTROESOPHAGEAL REFLUX DISEASE WITHOUT ESOPHAGITIS: ICD-10-CM

## 2023-06-03 RX ORDER — FAMOTIDINE 40 MG/1
TABLET, FILM COATED ORAL
Qty: 30 TABLET | Refills: 2 | Status: SHIPPED | OUTPATIENT
Start: 2023-06-03

## 2023-06-06 ENCOUNTER — TELEPHONE (OUTPATIENT)
Dept: NEUROLOGY | Facility: CLINIC | Age: 48
End: 2023-06-06

## 2023-06-06 DIAGNOSIS — G93.2 PSEUDOTUMOR CEREBRI: Primary | ICD-10-CM

## 2023-06-06 RX ORDER — ACETAZOLAMIDE 250 MG/1
250 TABLET ORAL 3 TIMES DAILY
Qty: 45 TABLET | Refills: 0 | Status: SHIPPED | OUTPATIENT
Start: 2023-06-06

## 2023-06-06 NOTE — TELEPHONE ENCOUNTER
Received VM transcription:    Hi, this is [employer]  If you could please give me a call back at [phone]  Thank you   --------------------------------------------------------    Spoke with pt and she says she's having pressure in her head that is so bad that it is waking her up at night  Having hard time falling asleep at night  Says pressure is in whole head  Pt says the pain is so bad that is feels like someone just beat her head  Denies any associated symptoms  Denies precipitating factors, not doing anything different  Says it started 2 or 4 days ago  Taking the Fioricet but says she is limited to how many she can take  Rohit Mix - Please advise  Best tae [phone], ok to leave detailed message

## 2023-06-06 NOTE — TELEPHONE ENCOUNTER
Did the diamox help the last time? That is the best option for her to decrease the feeling of pressure she is having  Take 1 tab 3 times a day for 3 days    Sent to pharmacy

## 2023-06-08 RX ORDER — FUROSEMIDE 20 MG/1
20 TABLET ORAL 2 TIMES DAILY
Qty: 20 TABLET | Refills: 0 | Status: SHIPPED | OUTPATIENT
Start: 2023-06-08 | End: 2023-06-15

## 2023-06-08 RX ORDER — DIHYDROERGOTAMINE MESYLATE 4 MG/ML
0.72 SPRAY, METERED NASAL AS NEEDED
Qty: 8 ML | Refills: 11 | Status: SHIPPED | OUTPATIENT
Start: 2023-06-08

## 2023-06-08 RX ORDER — DEXAMETHASONE 2 MG/1
2 TABLET ORAL
Qty: 5 TABLET | Refills: 0 | Status: SHIPPED | OUTPATIENT
Start: 2023-06-08

## 2023-06-08 NOTE — TELEPHONE ENCOUNTER
With her extensive medical history we are really limited in what we can give  We can do Decadron 2 mg for 5 days  Lasix 20 mg twice a day for up to 10 days    Debra was sent to [facility] for her

## 2023-06-08 NOTE — TELEPHONE ENCOUNTER
Received VM transcription:    Hi, this is [employer]  I just missed a call from Loc [facility]  If you could please call me back at [phone]  Thank you   ----------------------------------------------------    Spoke with pt  And she says yes, diamox did help, however, she has a history of kidney stones and the last time she took diamox for 5 days, says she wound up in the hospital  Two weeks ago had 2 kidney stones on each side and they had to do surgery to get them out  Pt says when wakes up in morning, she wakes up with such pressure that it feels like someone just beat her up on her head  Not sure what's going on but doesn't want to take diamox due to risk of kidney stone  Divine Lopez - Please advise

## 2023-06-09 NOTE — TELEPHONE ENCOUNTER
Called [phone] and left a detailed message on pt's answering machine of the below and for a call back if any questions

## 2023-06-12 ENCOUNTER — APPOINTMENT (OUTPATIENT)
Dept: LAB | Facility: HOSPITAL | Age: 48
End: 2023-06-12
Payer: MEDICARE

## 2023-06-12 DIAGNOSIS — D75.1 ERYTHROCYTOSIS: ICD-10-CM

## 2023-06-12 LAB — GAS + CO PNL BLDA: 1.9 % (ref 0–1.5)

## 2023-06-12 PROCEDURE — 82375 ASSAY CARBOXYHB QUANT: CPT

## 2023-06-12 PROCEDURE — 36415 COLL VENOUS BLD VENIPUNCTURE: CPT

## 2023-06-13 ENCOUNTER — OFFICE VISIT (OUTPATIENT)
Dept: HEMATOLOGY ONCOLOGY | Facility: CLINIC | Age: 48
End: 2023-06-13
Payer: MEDICARE

## 2023-06-13 VITALS
RESPIRATION RATE: 16 BRPM | OXYGEN SATURATION: 98 % | HEART RATE: 60 BPM | DIASTOLIC BLOOD PRESSURE: 76 MMHG | SYSTOLIC BLOOD PRESSURE: 110 MMHG | WEIGHT: 145 LBS | TEMPERATURE: 98.1 F | HEIGHT: 64 IN | BODY MASS INDEX: 24.75 KG/M2

## 2023-06-13 DIAGNOSIS — D50.8 IRON DEFICIENCY ANEMIA SECONDARY TO INADEQUATE DIETARY IRON INTAKE: Primary | ICD-10-CM

## 2023-06-13 DIAGNOSIS — R53.83 OTHER FATIGUE: ICD-10-CM

## 2023-06-13 DIAGNOSIS — E53.8 B12 DEFICIENCY: ICD-10-CM

## 2023-06-13 DIAGNOSIS — E53.8 FOLIC ACID DEFICIENCY: ICD-10-CM

## 2023-06-13 PROCEDURE — 99214 OFFICE O/P EST MOD 30 MIN: CPT | Performed by: NURSE PRACTITIONER

## 2023-06-13 NOTE — PROGRESS NOTES
Hematology/Oncology Outpatient Follow-up  Mar De Leon 49 y o  female 1975 [id number]    Date:  6/13/2023      Assessment and Plan:  1  Iron deficiency anemia secondary to inadequate dietary iron intake  Patient has a history of iron deficiency due to malabsorption from her prior bariatric surgery/GI issues  Has had IV iron replacement in the past   She has not anemic according to her most recent laboratory studies hemoglobin 13 7  Unfortunately some of her labs were missed including recent iron panel  Decision was made to repeat laboratory studies for her nutritional deficiencies and also check TSH given chronic fatigue when she goes for her next IV infusion next month 7/19  We will contact her if any any changes are needed or if she needs additional IV iron/etc  otherwise she will follow-up again with repeat labs in 6 months from now or sooner should the need arise     - CBC and differential; Future  - Comprehensive metabolic panel; Future  - C-reactive protein; Future  - Sedimentation rate, automated; Future  - Vitamin B12; Future  - Iron Panel (Includes Ferritin, Iron Sat%, Iron, and TIBC); Future  - Ferritin; Future  - Folate; Future  - Iron Panel (Includes Ferritin, Iron Sat%, Iron, and TIBC); Future  - Ferritin; Future    2  B12 deficiency  Will continue to get vitamin B12 injections every 3 months when she goes for her IV migraine medication  She is also taking oral supplements  Will await repeat vitamin B12 level  - Vitamin B12; Future  - Vitamin B12; Future    3  Folic acid deficiency  Continue folic acid supplements 2 mg daily  Await repeat level  - Folate; Future  - Folate; Future    4  Other fatigue  - Iron Panel (Includes Ferritin, Iron Sat%, Iron, and TIBC); Future  - Ferritin; Future  - Folate; Future  - Vitamin B12; Future  - TSH, 3rd generation with Free T4 reflex; Future  - Vitamin D 25 hydroxy;  Future      HPI:  Patient is a pleasant 26-year-old female with complicated past medical history who presented originally for further evaluation and treatment of her microcytic anemia/iron deficiency   She had gastric sleeve surgery 2016 followed by laparoscopic paraesophageal hernia repair WB 4632 which later resulted in GE junction leak/fistula requiring open repair and lead to sepsis/inpatient hospitalization for 5 months   She also was diagnosed with pseudotumor cerebri in 2015 had  shunt placed which had to be removed with her sepsis and later had VA shunt placed   She has  PTSD as a result of her above-mentioned events, kidney stones and migraines   She does have a neurologist and gastroenterologist who is monitoring her       Her laboratory studies from 08/22/2021 showed microcytic hypochromic anemia H&H 8 6/30, MCV 77, MCH 22 1 her platelet count and white cells or normal   BMP normal  She had significant iron deficiency iron saturation 5%, TIBC 414, serum iron 22 with ferritin 2  Her iron deficiency was corrected with IV iron Venofer x6 treatments November/December 2021  Interval history:  Patient presents today for a follow-up visit  She continues to get vitamin B12 injections every 3 months along with her IV migraine medication  She is also taking oral B12 supplements and folic acid 2 mg daily  Was admitted to the hospital recently May 2023 with obstructive kidney stones which required stenting  She has history of multiple episodes of kidney stones throughout her life even prior to her bariatric surgery  She continues to report ongoing fatigue which has been a chronic issue since she was admitted with complications/sepsis in 5377  All of our ordered laboratory studies were not completed for some reason  Her carboxyhemoglobin level from yesterday continues to be slightly elevated 1 9%; she does admit to vaping marijuana in the evening time for her other health issues which could be the reason    Denies smoking, secondhand smoke exposure or being around any campfires/woodstoves/exhaust/etc  5/18/2023 during her hospital admission she had a CBC done which was completely normal hemoglobin 13 7  BMP was appropriate  Nutritional deficiencies were not evaluated recently  ROS: Review of Systems   Constitutional: Positive for fatigue  Negative for activity change, appetite change, chills and fever  HENT: Negative for congestion, mouth sores, nosebleeds, sore throat and trouble swallowing  Eyes: Negative  Respiratory: Negative for cough, chest tightness and shortness of breath  Cardiovascular: Negative for chest pain, palpitations and leg swelling  Gastrointestinal: Positive for constipation  Negative for abdominal distention, abdominal pain, blood in stool, diarrhea, nausea and vomiting  Genitourinary: Negative for difficulty urinating, dysuria and hematuria  Musculoskeletal: Negative for arthralgias, back pain, gait problem, joint swelling and myalgias  Skin: Negative for color change, pallor and rash  Neurological: Positive for headaches  Negative for dizziness, weakness, light-headedness and numbness  Hematological: Negative for adenopathy  Does not bruise/bleed easily  Psychiatric/Behavioral: Negative for dysphoric mood and sleep disturbance  The patient is not nervous/anxious  Past Medical History:   Diagnosis Date   • Abdominal pain    • Acute cystitis with hematuria 1/3/2020   • Brain condition     Pseudotumor Cerebri    • Chronic kidney disease    • COVID-19 virus infection 11/10/2022   • De Quervain's disease (tenosynovitis)    • Esophageal perforation    • Gastric leak    • GERD (gastroesophageal reflux disease)    • Headache    • Idiopathic intracranial hypertension    • Kidney stone    • Migraine    • Moderate protein-calorie malnutrition ([facility] [address] ) 2/21/2018   • No blood products     per pt: personal and Buddhist beliefs   surgeon office aware 12/13/19   • Papilledema, both eyes    • PONV (postoperative nausea and vomiting)    • "Postgastrectomy malabsorption    • Presence of lumboperitoneal shunt     Resolved: Sep 20, 2017   • Rotator cuff tendinitis     Resolved: Aug 23, 2017   • Visual field defect        Past Surgical History:   Procedure Laterality Date   • BREAST BIOPSY Left 1993    benign   • CSF SHUNT      LP shunt - 2015 -  shunt - 2017   • ESOPHAGOGASTRODUODENOSCOPY N/A 02/23/2018    Procedure: ESOPHAGOGASTRODUODENOSCOPY (EGD) WITH ESOPHAGEAL STENT PLACEMENT;  Surgeon: Justyn Meza MD;  [facility];  Service: Thoracic   • ESOPHAGOGASTRODUODENOSCOPY N/A 02/23/2018    Procedure: ESOPHAGOGASTRODUODENOSCOPY (EGD) WITH REMOVAL ESOPHAGEAL STENT  AND REPLACEMENT WITH 23mm X155mm 1111 [address] [place] [address],[address];  Surgeon: Justyn Meza MD;  [facility];  Service: Thoracic   • ESOPHAGOGASTRODUODENOSCOPY N/A 03/28/2018    Procedure: ESOPHAGOGASTRODUODENOSCOPY (EGD) with PEJ placement ;  Surgeon: Hermelindo Green MD;  [facility]: [facility]; Service: Gastroenterology   • ESOPHAGOGASTRODUODENOSCOPY N/A 04/05/2018    Procedure: ESOPHAGOGASTRODUODENOSCOPY (EGD) with botox injection and kaofed placement;  Surgeon: Ney Naranjo DO;  [facility]; Service: Gastroenterology   • ESOPHAGOGASTRODUODENOSCOPY N/A 04/10/2018    Procedure: ESOPHAGOGASTRODUODENOSCOPY (EGD) with Kaofed placement;  Surgeon: Nelida Barillas MD;  [facility]; Service: Gastroenterology   • ESOPHAGOSCOPY WITH STENT INSERTION N/A 01/24/2018    Procedure: INSERTION STENT ESOPHAGEAL;  Surgeon: Giacomo Talamantes MD;  [facility];   Service: Gastroenterology   • EYE SURGERY Bilateral 1980    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)    • FL RETROGRADE PYELOGRAM  06/24/2020   • FL RETROGRADE PYELOGRAM  08/21/2021   • FL RETROGRADE PYELOGRAM  5/4/2023   • FL RETROGRADE PYELOGRAM  5/18/2023   • GASTRIC BYPASS  12/19/2016    Lap sleeve gastrectomy w/shunt length shortening procedure   • HIATAL HERNIA REPAIR     • HYSTERECTOMY  03/14/2013 " • IR LUMBAR PUNCTURE  08/29/2018   • LAPAROTOMY N/A 01/25/2018    Procedure: Exploratory Laparotomy, wash out,placement of drains, placement of NG feeding tube ;   Surgeon: Terrell Conde DO;  [facility];  Service: General   • LUMBAR PERITONEAL SHUNT  11/19/2015    Laparoscopic assisted   • NV BREAST REDUCTION Bilateral 03/25/2022    Procedure: BILATERAL BREAST REDUCTION;  Surgeon: Margo Snyder MD;  [facility];  Service: Plastics   • NV CRTJ SHUNT [id number] Right 12/18/2019    Procedure: IMAGE GUIDED INSERTION OF RIGHT CORONAL VENTRICULAR-ATRIAL SHUNT;  Surgeon: Margaret Latif MD;  [facility];  Service: Neurosurgery   • NV CRTJ SHUNT [id number] TERMINUS Right 05/31/2017    Procedure: IMAGE GUIDED CORONAL PLACEMENT OF PROGRAMABLE VENTRICULAR-PERITONEAL SHUNT, REMOVAL OF LP SHUNT ;  Surgeon: Margaret Latif MD;  [facility];  Service: Neurosurgery   • NV CYSTO BLADDER W/URETERAL CATHETERIZATION N/A 06/06/2020    Procedure: Bilateral CYSTOSCOPY RETROGRADE PYELOGRAM WITH INSERTION STENT URETERAL;  Surgeon: Terell Gomez MD;  Location: [facility] MA[facility] OR;  Service: Urology   • NV CYSTO BLADDER W/URETERAL CATHETERIZATION Bilateral 5/4/2023    Procedure: CYSTOSCOPY RETROGRADE PYELOGRAM WITH INSERTION STENT URETERAL;  Surgeon: Krystal Vital MD;  [facility];  Service: Urology   • NV CYSTO/URETERO W/LITHOTRIPSY &INDWELL STENT INSRT Bilateral 06/24/2020    Procedure: CYSTOSCOPY URETEROSCOPY WITH LITHOTRIPSY HOLMIUM LASER, RETROGRADE PYELOGRAM AND exchange bilateral  STENTs URETERAL;  Surgeon: Guillermo Villarreal MD;  [facility];  Service: Urology   • NV CYSTO/URETERO W/LITHOTRIPSY &INDWELL STENT INSRT Left 08/21/2021    Procedure: CYSTOSCOPY; LEFT URETEROSCOPY WITH RETROGRADE PYELOGRAM, REMOVAL OF STONE AND INSERTION LEFT URETERAL STENT;  Surgeon: Adolfo Louise MD;  [facility];  Service: Urology   • NV CYSTO/URETERO W/LITHOTRIPSY &INDWELL STENT INSRT N/A 5/18/2023    Procedure: CYSTOSCOPY URETEROSCOPY WITH LITHOTRIPSY HOLMIUM LASER, RETROGRADE PYELOGRAM, REMOVAL OF BILATERAL STENTS,  AND INSERTION STENT URETERAL LEFT ,BASKET EXTRACTION OF STONE LEFT SIDE;  Surgeon: Estiven Rider MD;  [facility];  Service: Urology   • WY EGD TRANSORAL BIOPSY SINGLE/MULTIPLE N/A 06/27/2018    Procedure: ESOPHAGOGASTRODUODENOSCOPY (EGD) with padlock clip placement;  Surgeon: Jackie Singleton MD;  [facility];   Service: Bariatrics   • WY RMVL COMPL CSF SHUNT SYSTEM W/O RPLCMT SHUNT Right 02/05/2018    Procedure: Removal of  shunt;  Surgeon: Griselda Hassan MD;  [facility];  Service: Neurosurgery   • WY RPLCMT/REVJ CSF SHUNT VALVE/CATH SHUNT SYS Right 01/25/2018    Procedure: Externalization of right-sided SHUNT VENTRICULAR-PERITONEAL in anterior chest wall ribs two and three level  ;  Surgeon: Jaxson Lara MD;  [facility];  Service: Neurosurgery   • REDUCTION MAMMAPLASTY Bilateral    • WRIST SURGERY Right     x3 2006, 2008       Social History     Socioeconomic History   • Marital status: Single     Spouse name: Not on file   • Number of children: 2   • Years of education: High School or GED    • Highest education level: Not on file   Occupational History   • Occupation: employed    Tobacco Use   • Smoking status: Former     Packs/day: 0 50     Years: 20 00     Total pack years: 10 00     Types: Cigarettes     Start date: 1/1/1992     Quit date: 8/24/2012     Years since quitting: 10 8   • Smokeless tobacco: Never   Vaping Use   • Vaping Use: Every day   • Substances: THC, CBD   Substance and Sexual Activity   • Alcohol use: Never   • Drug use: Yes     Frequency: 7 0 times per week     Types: Marijuana     Comment: medical marijuana, vaping & topical    • Sexual activity: Yes   Other Topics Concern   • Not on file   Social History Narrative    ** Merged History Encounter **          Social Determinants of Health     Financial Resource Strain: Not on file   Food Insecurity: No Food Insecurity (5/4/2023)    Hunger Vital Sign    • Worried About Running Out of Food in the Last Year: Never true    • Ran Out of Food in the Last Year: Never true   Transportation Needs: No Transportation Needs (5/4/2023)    PRAPARE - Transportation    • Lack of Transportation (Medical): No    • Lack of Transportation (Non-Medical):  No   Physical Activity: Not on file   Stress: Not on file   Social Connections: Not on file   Intimate Partner Violence: Not on file   Housing Stability: Low Risk  (5/4/2023)    Housing Stability Vital Sign    • Unable to Pay for Housing in the Last Year: No    • Number of Places Lived in the Last Year: 1    • Unstable Housing in the Last Year: No       Family History   Problem Relation Age of Onset   • Kidney cancer Mother 77   • No Known Problems Father    • No Known Problems Sister    • No Known Problems Daughter    • No Known Problems Maternal Grandmother    • Colon cancer Maternal Grandfather 44   • No Known Problems Paternal Grandmother    • Cancer Paternal Grandfather    • Thyroid cancer Brother 36   • No Known Problems Maternal Aunt    • No Known Problems Maternal Aunt    • No Known Problems Paternal Aunt    • Cancer Family         Gastric   • Leukemia Family    • Colon cancer Family    • Pancreatic cancer Family    • Lung cancer Maternal Uncle 61       Allergies   Allergen Reactions   • Benadryl [Diphenhydramine] Anaphylaxis     Throat closing   • Phenergan [Promethazine] Anaphylaxis         Current Outpatient Medications:   •  acetaZOLAMIDE (DIAMOX) 250 mg tablet, Take 1 tablet (250 mg total) by mouth 3 (three) times a day, Disp: 45 tablet, Rfl: 0  •  ascorbic acid (VITAMIN C) 250 MG CHEW, Chew 250 mg daily, Disp: , Rfl:   •  Biotin 1 MG CAPS, Take 1 mg by mouth daily  , Disp: , Rfl:   •  calcium citrate-vitamin D (CITRACAL+D) 315-200 MG-UNIT per tablet, Take 1 tablet by mouth 2 (two) "times a day, Disp: , Rfl:   •  dexamethasone (DECADRON) 2 mg tablet, Take 1 tablet (2 mg total) by mouth daily with breakfast, Disp: 5 tablet, Rfl: 0  •  Dihydroergotamine Mesylate HFA (Trudhesa) 0 725 MG/ACT AERS, 0 725 mg into each nostril if needed (migraine) Use at onset of migraine  May repeat in 1 hour if needed  Limit of 3 a week or 8 a month  Do not use within 24 hours of a triptan , Disp: 8 mL, Rfl: 11  •  famotidine (PEPCID) 40 MG tablet, take 1 tablet by mouth once daily, Disp: 30 tablet, Rfl: 2  •  ferrous gluconate 324 (37 5 Fe) mg, Take 324 mg by mouth 2 (two) times a day before meals, Disp: , Rfl:   •  folic acid (FOLVITE) 1 mg tablet, Take 2 tablets (2 mg total) by mouth daily, Disp: 180 tablet, Rfl: 3  •  furosemide (LASIX) 20 mg tablet, Take 1 tablet (20 mg total) by mouth 2 (two) times a day, Disp: 20 tablet, Rfl: 0  •  Multiple Vitamin (MULTIVITAMIN) tablet, Take 1 tablet by mouth daily Wears a patch, Disp: , Rfl:   •  Multiple Vitamins-Minerals (Hair/Skin/Nails) CAPS, Take 1 tablet by mouth 2 (two) times a day  , Disp: , Rfl:   •  omeprazole (PriLOSEC) 40 MG capsule, take 1 capsule by mouth twice a day, Disp: 60 capsule, Rfl: 2  •  vitamin B-12 (VITAMIN B-12) 500 mcg tablet, Take 500 mcg by mouth daily, Disp: , Rfl:       Physical Exam:  /76 (BP Location: Right arm, Patient Position: Sitting, Cuff Size: Adult)   Pulse 60   Temp 98 1 °F (36 7 °C)   Resp 16   Ht 5' 4\" (1 626 m)   Wt 65 8 kg (145 lb)   LMP  (LMP Unknown)   SpO2 98%   BMI 24 89 kg/m²     Physical Exam  Vitals reviewed  Constitutional:       General: She is not in acute distress  Appearance: She is well-developed  She is not diaphoretic  HENT:      Head: Normocephalic and atraumatic  Eyes:      General: No scleral icterus  Conjunctiva/sclera: Conjunctivae normal       Pupils: Pupils are equal, round, and reactive to light  Neck:      Thyroid: No thyromegaly     Cardiovascular:      Rate and Rhythm: " Normal rate and regular rhythm  Heart sounds: Normal heart sounds  No murmur heard  Pulmonary:      Effort: Pulmonary effort is normal  No respiratory distress  Breath sounds: Normal breath sounds  Abdominal:      General: There is no distension  Palpations: Abdomen is soft  There is no hepatomegaly or splenomegaly  Tenderness: There is no abdominal tenderness  Musculoskeletal:         General: No swelling  Normal range of motion  Cervical back: Normal range of motion and neck supple  Lymphadenopathy:      Cervical: No cervical adenopathy  Upper Body:      Right upper body: No axillary adenopathy  Left upper body: No axillary adenopathy  Skin:     General: Skin is warm and dry  Findings: No erythema or rash  Neurological:      General: No focal deficit present  Mental Status: She is alert and oriented to person, place, and time  Psychiatric:         Mood and Affect: Mood normal  Affect is blunt  Behavior: Behavior normal  Behavior is cooperative  Thought Content: Thought content normal          Judgment: Judgment normal            Labs:  Lab Results   Component Value Date    HCT 42 4 05/18/2023    HGB 13 7 05/18/2023    MCV 90 05/18/2023     05/18/2023    WBC 5 31 05/18/2023        Patient voiced understanding and agreement in the above discussion  Aware to contact our office with questions/symptoms in the interim  This note has been generated by voice recognition software system  Therefore, there may be spelling, grammar, and or syntax errors  Please contact if questions arise

## 2023-06-14 ENCOUNTER — OFFICE VISIT (OUTPATIENT)
Dept: PODIATRY | Facility: CLINIC | Age: 48
End: 2023-06-14
Payer: MEDICARE

## 2023-06-14 ENCOUNTER — TELEPHONE (OUTPATIENT)
Dept: NEUROLOGY | Facility: CLINIC | Age: 48
End: 2023-06-14

## 2023-06-14 VITALS
HEIGHT: 64 IN | WEIGHT: 145 LBS | BODY MASS INDEX: 24.75 KG/M2 | DIASTOLIC BLOOD PRESSURE: 88 MMHG | SYSTOLIC BLOOD PRESSURE: 160 MMHG | HEART RATE: 66 BPM

## 2023-06-14 DIAGNOSIS — M89.8X9 EXOSTOSIS: Primary | ICD-10-CM

## 2023-06-14 DIAGNOSIS — G43.109 MIGRAINE WITH AURA AND WITHOUT STATUS MIGRAINOSUS, NOT INTRACTABLE: Primary | ICD-10-CM

## 2023-06-14 DIAGNOSIS — M79.676 PAIN OF FIFTH TOE: ICD-10-CM

## 2023-06-14 PROCEDURE — 99212 OFFICE O/P EST SF 10 MIN: CPT | Performed by: STUDENT IN AN ORGANIZED HEALTH CARE EDUCATION/TRAINING PROGRAM

## 2023-06-14 NOTE — TELEPHONE ENCOUNTER
Received VM transcription:    Hi, this is Guillermina calling from [facility]  I'm calling to get a prior authorization status on patient's medication for Trudhesa 0 725 nasal spray  First name is Gary Pierre date of birth 1975  We faxed over the prior authorization form  We just haven't received a completed one back yet  Can you please fax that to us as soon as possible? Our fax number is [phone]  Our phone number is [phone]  Thank you

## 2023-06-15 DIAGNOSIS — G93.2 PSEUDOTUMOR CEREBRI: ICD-10-CM

## 2023-06-15 RX ORDER — FUROSEMIDE 20 MG/1
TABLET ORAL
Qty: 60 TABLET | Refills: 1 | Status: SHIPPED | OUTPATIENT
Start: 2023-06-15

## 2023-06-15 NOTE — PROGRESS NOTES
Assessment/Plan:    No problem-specific Assessment & Plan notes found for this encounter  Diagnoses and all orders for this visit:    Exostosis    Pain of fifth toe      Plan:     -  Patient was counseled and educated on the condition and the diagnosis  The diagnosis, treatment options and prognosis were discussed in detail    -We discussed continuing with conservative measures versus surgical treatment including resection of exostosis  Patient reports she would like to defer surgical treatments until wintertime  Meantime she is to continue with conservative measures including weight-bear as tolerated in supportive sneaker with wider toe box and toe sleeves  -Return as needed    Subjective:      Patient ID: Mar De Leon is a 49 y o  female  19-year-old female with past medical history as below presents for continued follow-up of right fifth digit pain secondary to possible exostosis  Patient reports the pain has been there since she was a little girl  She did try conservative measures with toe sleeves which did not help  She continues to have discomfort to the little toe specifically rubbing against her shoes  No other complaints  The following portions of the patient's history were reviewed and updated as appropriate:   She  has a past medical history of Abdominal pain, Acute cystitis with hematuria (1/3/2020), Brain condition, Chronic kidney disease, COVID-19 virus infection (11/10/2022), Zayas [place] Quervain's disease (tenosynovitis), Esophageal perforation, Gastric leak, GERD (gastroesophageal reflux disease), Headache, Idiopathic intracranial hypertension, Kidney stone, Migraine, Moderate protein-calorie malnutrition ([facility] [address] ) (2/21/2018), No blood products, Papilledema, both eyes, PONV (postoperative nausea and vomiting), Postgastrectomy malabsorption, Presence of lumboperitoneal shunt, Rotator cuff tendinitis, and Visual field defect    She   Patient Active Problem List    Diagnosis Date Noted   • PONV (postoperative nausea and vomiting)    • Obstruction of left ureteropelvic junction (UPJ) due to stone 05/03/2023   • Chronic idiopathic constipation 09/23/2022   • Macromastia 04/01/2022   • S/P bilateral breast reduction 04/01/2022   • Annual physical exam 12/17/2021   • Iron deficiency anemia secondary to inadequate dietary iron intake 11/09/2021   • Migraine with aura and without status migrainosus, not intractable 04/30/2021   • Encounter for surgical aftercare following surgery of digestive system 09/24/2020   • Papilledema 12/10/2019   • Postgastrectomy malabsorption 09/20/2019   • History of idiopathic intracranial hypertension 03/08/2019   • Chronic tension-type headache, intractable 03/08/2019   • PTSD (post-traumatic stress disorder) 01/10/2019   • Renal calculi 10/04/2018   • Refusal of influenza vaccine by provider 09/28/2018   • Bariatric surgery status 06/22/2018   • Choroidal nevus, right eye 02/03/2018   • Refusal of blood product 01/31/2018   • Murmur, cardiac 01/26/2018   • S/P  shunt 01/24/2018   • Gastroesophageal reflux disease 12/15/2017   • Hypercholesteremia 08/11/2017   • Pseudotumor cerebri 05/31/2017   • Mixed incontinence 04/03/2017   • Subacromial bursitis of right shoulder joint 09/16/2015   • Family history of malignant neoplasm of gastrointestinal tract 07/09/2013     She  has a past surgical history that includes CSF shunt; Gastric bypass (12/19/2016); Hysterectomy (03/14/2013); pr crtj shunt ykjqdlaoeq-airfvdyly-xjpekhw terminus (Right, 05/31/2017); ESOPHAGOSCOPY WITH STENT INSERTION (N/A, 01/24/2018); Eye surgery (Bilateral, 1980); pr rplcmt/revj csf shunt valve/cath shunt sys (Right, 01/25/2018); LAPAROTOMY (N/A, 01/25/2018); pr rmvl compl csf shunt system w/o rplcmt shunt (Right, 02/05/2018); Esophagogastroduodenoscopy (N/A, 02/23/2018); Esophagogastroduodenoscopy (N/A, 02/23/2018); Esophagogastroduodenoscopy (N/A, 03/28/2018);  Esophagogastroduodenoscopy (N/A, "04/05/2018); Esophagogastroduodenoscopy (N/A, 04/10/2018); pr egd transoral biopsy single/multiple (N/A, 06/27/2018); IR lumbar puncture (08/29/2018); Lumbar peritoneal shunt (11/19/2015); Wrist surgery (Right); pr crtj shunt gdarresxcu-qvo-rpv-aur (Right, 12/18/2019); pr cysto bladder w/ureteral catheterization (N/A, 06/06/2020); Hiatal hernia repair; FL retrograde pyelogram (06/24/2020); pr cysto/uretero w/lithotripsy &indwell stent insrt (Bilateral, 06/24/2020); pr cysto/uretero w/lithotripsy &indwell stent insrt (Left, 08/21/2021); FL retrograde pyelogram (08/21/2021); Breast biopsy (Left, 1993); pr breast reduction (Bilateral, 03/25/2022); Reduction mammaplasty (Bilateral); pr cysto bladder w/ureteral catheterization (Bilateral, 5/4/2023); FL retrograde pyelogram (5/4/2023); pr cysto/uretero w/lithotripsy &indwell stent insrt (N/A, 5/18/2023); and FL retrograde pyelogram (5/18/2023)       Review of Systems   All other systems reviewed and are negative  Objective:      /88 (BP Location: Left arm, Patient Position: Sitting, Cuff Size: Standard) Comment: patient states her head hurts today  Pulse 66   Ht 5' 4\" (1 626 m)   Wt 65 8 kg (145 lb)   LMP  (LMP Unknown)   BMI 24 89 kg/m²          Physical Exam  Cardiovascular:      Pulses:           Dorsalis pedis pulses are 2+ on the right side  Posterior tibial pulses are 2+ on the right side  Musculoskeletal:      Right foot: Tenderness present  Comments: Tenderness to touch noted distal fifth digit just proximal to the nail plate  Toenail does not appear to be ingrown  No apparent clinical prominence noted            "

## 2023-06-21 RX ORDER — RIMEGEPANT SULFATE 75 MG/75MG
75 TABLET, ORALLY DISINTEGRATING ORAL AS NEEDED
Qty: 16 TABLET | Refills: 6 | Status: SHIPPED | OUTPATIENT
Start: 2023-06-21

## 2023-06-21 NOTE — TELEPHONE ENCOUNTER
PA completed on [facility]    Awaiting determination

## 2023-06-21 NOTE — TELEPHONE ENCOUNTER
Pt made aware of all the below  She verbalized understanding  [employer] PA started on CMM   Octavio Marcus: [id number]    Awaiting clinical questions

## 2023-06-26 NOTE — TELEPHONE ENCOUNTER
PA has been approved through 6/21/24    Approval letter faxed to [facility] at [phone]

## 2023-06-27 ENCOUNTER — TELEPHONE (OUTPATIENT)
Dept: NEUROLOGY | Facility: CLINIC | Age: 48
End: 2023-06-27

## 2023-06-27 NOTE — TELEPHONE ENCOUNTER
Received VM Transcription:  Taken off 6-26-23 3:22    Hi, my name is Kimmie  I'm calling from [facility], calling about our mutual patient  Clover Butts  [phone]  Just calling to verify if you all were able to receive the patients prior authorization forms for Nurtec 75 milligram prescription that we sent over  If you can follow up with us on the forms, let us know if you received them or if we should re fax them over or you can reach us at [phone] hours of operation are Monday through Friday, 8 30 AM to 8 o'clock PM Eastern Standard Time  Thank you have a blessed day

## 2023-06-29 NOTE — TELEPHONE ENCOUNTER
nurtec PA completed and approved, see encounter from 6/14    Approval letter faxed to [facility] on 6/26    Approval letter faxed again to [facility]

## 2023-07-03 ENCOUNTER — TELEPHONE (OUTPATIENT)
Dept: NEUROLOGY | Facility: CLINIC | Age: 48
End: 2023-07-03

## 2023-07-03 DIAGNOSIS — K21.9 GASTROESOPHAGEAL REFLUX DISEASE, UNSPECIFIED WHETHER ESOPHAGITIS PRESENT: ICD-10-CM

## 2023-07-03 RX ORDER — OMEPRAZOLE 40 MG/1
CAPSULE, DELAYED RELEASE ORAL
Qty: 60 CAPSULE | Refills: 2 | Status: SHIPPED | OUTPATIENT
Start: 2023-07-03 | End: 2023-07-10

## 2023-07-03 NOTE — TELEPHONE ENCOUNTER
received vm from 12:31pm-Hi, this is Caramen calling from [facility] to get a prior authorization status for trudhesa nasal spray. patient is Blanca corona. date of birth [birth year]. We faxed over the PA forms to be filled out and fax back to us. We haven't received the completed ones back yet. If you can please fax it to us as soon as possible. Our fax number is [phone]. If you have any questions, you can give us a call back. Our phone number is [phone].  Thank you.  --------------------------------------------------  Per encounter from 6/14. trudhesa was denied and pt now ordered nurtec.      trudhesa denial letter faxed to [facility] and noted that pt is now ordered nurtec which has been approved

## 2023-07-07 ENCOUNTER — TELEPHONE (OUTPATIENT)
Dept: NEUROLOGY | Facility: CLINIC | Age: 48
End: 2023-07-07

## 2023-07-10 DIAGNOSIS — G93.2 PSEUDOTUMOR CEREBRI: ICD-10-CM

## 2023-07-10 DIAGNOSIS — K21.9 GASTROESOPHAGEAL REFLUX DISEASE, UNSPECIFIED WHETHER ESOPHAGITIS PRESENT: ICD-10-CM

## 2023-07-10 RX ORDER — OMEPRAZOLE 40 MG/1
CAPSULE, DELAYED RELEASE ORAL
Qty: 60 CAPSULE | Refills: 2 | Status: SHIPPED | OUTPATIENT
Start: 2023-07-10

## 2023-07-10 RX ORDER — ACETAZOLAMIDE 250 MG/1
TABLET ORAL
Qty: 45 TABLET | Refills: 0 | Status: SHIPPED | OUTPATIENT
Start: 2023-07-10

## 2023-07-19 ENCOUNTER — HOSPITAL ENCOUNTER (OUTPATIENT)
Dept: INFUSION CENTER | Facility: HOSPITAL | Age: 48
Discharge: HOME | End: 2023-07-19
Attending: INTERNAL MEDICINE
Payer: MEDICARE

## 2023-07-19 ENCOUNTER — HOSPITAL ENCOUNTER (OUTPATIENT)
Dept: INFUSION CENTER | Facility: HOSPITAL | Age: 48
Discharge: HOME | End: 2023-07-19
Attending: PSYCHIATRY & NEUROLOGY
Payer: MEDICARE

## 2023-07-19 VITALS
DIASTOLIC BLOOD PRESSURE: 84 MMHG | RESPIRATION RATE: 16 BRPM | SYSTOLIC BLOOD PRESSURE: 135 MMHG | HEART RATE: 57 BPM | TEMPERATURE: 97.7 F

## 2023-07-19 DIAGNOSIS — D50.8 IRON DEFICIENCY ANEMIA SECONDARY TO INADEQUATE DIETARY IRON INTAKE: Primary | ICD-10-CM

## 2023-07-19 DIAGNOSIS — E53.8 FOLIC ACID DEFICIENCY: ICD-10-CM

## 2023-07-19 DIAGNOSIS — G43.109 MIGRAINE WITH AURA AND WITHOUT STATUS MIGRAINOSUS, NOT INTRACTABLE: Primary | ICD-10-CM

## 2023-07-19 DIAGNOSIS — D50.8 IRON DEFICIENCY ANEMIA SECONDARY TO INADEQUATE DIETARY IRON INTAKE: ICD-10-CM

## 2023-07-19 DIAGNOSIS — D72.818 OTHER DECREASED WHITE BLOOD CELL (WBC) COUNT: ICD-10-CM

## 2023-07-19 DIAGNOSIS — D75.1 ERYTHROCYTOSIS: ICD-10-CM

## 2023-07-19 DIAGNOSIS — D69.6 THROMBOCYTOPENIA (HCC): ICD-10-CM

## 2023-07-19 DIAGNOSIS — E53.8 B12 DEFICIENCY: ICD-10-CM

## 2023-07-19 DIAGNOSIS — R53.83 OTHER FATIGUE: ICD-10-CM

## 2023-07-19 LAB
25(OH)D3 SERPL-MCNC: 73.4 NG/ML (ref 30–100)
ALBUMIN SERPL BCP-MCNC: 4.1 G/DL (ref 3.5–5)
ALP SERPL-CCNC: 49 U/L (ref 34–104)
ALT SERPL W P-5'-P-CCNC: 7 U/L (ref 7–52)
ANION GAP SERPL CALCULATED.3IONS-SCNC: 10 MMOL/L
AST SERPL W P-5'-P-CCNC: 18 U/L (ref 13–39)
BASOPHILS # BLD AUTO: 0.04 THOUSANDS/ÂΜL (ref 0–0.1)
BASOPHILS NFR BLD AUTO: 1 % (ref 0–1)
BILIRUB SERPL-MCNC: 0.41 MG/DL (ref 0.2–1)
BUN SERPL-MCNC: 17 MG/DL (ref 5–25)
CALCIUM SERPL-MCNC: 9.2 MG/DL (ref 8.4–10.2)
CHLORIDE SERPL-SCNC: 103 MMOL/L (ref 96–108)
CO2 SERPL-SCNC: 24 MMOL/L (ref 21–32)
CREAT SERPL-MCNC: 0.68 MG/DL (ref 0.6–1.3)
CRP SERPL QL: 1.2 MG/L
EOSINOPHIL # BLD AUTO: 0.22 THOUSAND/ÂΜL (ref 0–0.61)
EOSINOPHIL NFR BLD AUTO: 4 % (ref 0–6)
ERYTHROCYTE [DISTWIDTH] IN BLOOD BY AUTOMATED COUNT: 13 % (ref 11.6–15.1)
ERYTHROCYTE [SEDIMENTATION RATE] IN BLOOD: 2 MM/HOUR (ref 0–19)
FERRITIN SERPL-MCNC: 39 NG/ML (ref 11–307)
FOLATE SERPL-MCNC: 11.3 NG/ML
GFR SERPL CREATININE-BSD FRML MDRD: 103 ML/MIN/1.73SQ M
GLUCOSE SERPL-MCNC: 82 MG/DL (ref 65–140)
HCT VFR BLD AUTO: 45 % (ref 34.8–46.1)
HGB BLD-MCNC: 15.4 G/DL (ref 11.5–15.4)
IMM GRANULOCYTES # BLD AUTO: 0.02 THOUSAND/UL (ref 0–0.2)
IMM GRANULOCYTES NFR BLD AUTO: 0 % (ref 0–2)
IRON SATN MFR SERPL: 24 % (ref 15–50)
IRON SERPL-MCNC: 78 UG/DL (ref 50–170)
LDH SERPL-CCNC: 229 U/L (ref 140–271)
LYMPHOCYTES # BLD AUTO: 1.39 THOUSANDS/ÂΜL (ref 0.6–4.47)
LYMPHOCYTES NFR BLD AUTO: 28 % (ref 14–44)
MCH RBC QN AUTO: 30.3 PG (ref 26.8–34.3)
MCHC RBC AUTO-ENTMCNC: 34.2 G/DL (ref 31.4–37.4)
MCV RBC AUTO: 88 FL (ref 82–98)
MONOCYTES # BLD AUTO: 0.28 THOUSAND/ÂΜL (ref 0.17–1.22)
MONOCYTES NFR BLD AUTO: 6 % (ref 4–12)
NEUTROPHILS # BLD AUTO: 3.06 THOUSANDS/ÂΜL (ref 1.85–7.62)
NEUTS SEG NFR BLD AUTO: 61 % (ref 43–75)
NRBC BLD AUTO-RTO: 0 /100 WBCS
PLATELET # BLD AUTO: 181 THOUSANDS/UL (ref 149–390)
PMV BLD AUTO: 10.7 FL (ref 8.9–12.7)
POTASSIUM SERPL-SCNC: 4.1 MMOL/L (ref 3.5–5.3)
PROT SERPL-MCNC: 6.3 G/DL (ref 6.4–8.4)
RBC # BLD AUTO: 5.09 MILLION/UL (ref 3.81–5.12)
SODIUM SERPL-SCNC: 137 MMOL/L (ref 135–147)
TIBC SERPL-MCNC: 326 UG/DL (ref 250–450)
TSH SERPL DL<=0.05 MIU/L-ACNC: 0.89 UIU/ML (ref 0.45–4.5)
VIT B12 SERPL-MCNC: 203 PG/ML (ref 180–914)
WBC # BLD AUTO: 5.01 THOUSAND/UL (ref 4.31–10.16)

## 2023-07-19 PROCEDURE — 82306 VITAMIN D 25 HYDROXY: CPT

## 2023-07-19 PROCEDURE — 82728 ASSAY OF FERRITIN: CPT

## 2023-07-19 PROCEDURE — 85652 RBC SED RATE AUTOMATED: CPT

## 2023-07-19 PROCEDURE — 83550 IRON BINDING TEST: CPT

## 2023-07-19 PROCEDURE — 82607 VITAMIN B-12: CPT

## 2023-07-19 PROCEDURE — 86140 C-REACTIVE PROTEIN: CPT

## 2023-07-19 PROCEDURE — 82668 ASSAY OF ERYTHROPOIETIN: CPT

## 2023-07-19 PROCEDURE — 80053 COMPREHEN METABOLIC PANEL: CPT

## 2023-07-19 PROCEDURE — 85025 COMPLETE CBC W/AUTO DIFF WBC: CPT

## 2023-07-19 PROCEDURE — 83540 ASSAY OF IRON: CPT

## 2023-07-19 PROCEDURE — 96372 THER/PROPH/DIAG INJ SC/IM: CPT

## 2023-07-19 PROCEDURE — 84443 ASSAY THYROID STIM HORMONE: CPT

## 2023-07-19 PROCEDURE — 82746 ASSAY OF FOLIC ACID SERUM: CPT

## 2023-07-19 PROCEDURE — 83615 LACTATE (LD) (LDH) ENZYME: CPT

## 2023-07-19 RX ORDER — SODIUM CHLORIDE 9 MG/ML
20 INJECTION, SOLUTION INTRAVENOUS ONCE
Status: COMPLETED | OUTPATIENT
Start: 2023-07-19 | End: 2023-07-19

## 2023-07-19 RX ORDER — CYANOCOBALAMIN 1000 UG/ML
1000 INJECTION, SOLUTION INTRAMUSCULAR; SUBCUTANEOUS ONCE
Status: COMPLETED | OUTPATIENT
Start: 2023-07-19 | End: 2023-07-19

## 2023-07-19 RX ORDER — CYANOCOBALAMIN 1000 UG/ML
1000 INJECTION, SOLUTION INTRAMUSCULAR; SUBCUTANEOUS ONCE
OUTPATIENT
Start: 2023-10-11

## 2023-07-19 RX ORDER — SODIUM CHLORIDE 9 MG/ML
20 INJECTION, SOLUTION INTRAVENOUS ONCE
OUTPATIENT
Start: 2023-10-11

## 2023-07-19 RX ADMIN — EPTINEZUMAB-JJMR 300 MG: 100 INJECTION INTRAVENOUS at 15:07

## 2023-07-19 RX ADMIN — CYANOCOBALAMIN 1000 MCG: 1000 INJECTION, SOLUTION INTRAMUSCULAR at 15:53

## 2023-07-19 RX ADMIN — SODIUM CHLORIDE 20 ML/HR: 0.9 INJECTION, SOLUTION INTRAVENOUS at 14:17

## 2023-07-19 NOTE — PROGRESS NOTES
Patient tolerated Vit B12 injection today in right deltoid without issue.  Discharged safely off unit with AVS.

## 2023-07-19 NOTE — PROGRESS NOTES
Pt tolerated iv vyepti today without incident. Peripheral iv discontinued jelco intact. ( Pt also had B12 with dr Wu Neigh orders today on separate acct. ) Discharged ambulatory with avs.
04-Jun-2021 15:06

## 2023-07-21 LAB — EPO SERPL-ACNC: 9.1 MIU/ML (ref 2.6–18.5)

## 2023-07-25 PROBLEM — E53.8 B12 DEFICIENCY: Status: ACTIVE | Noted: 2023-07-25

## 2023-08-02 ENCOUNTER — HOSPITAL ENCOUNTER (OUTPATIENT)
Dept: INFUSION CENTER | Facility: HOSPITAL | Age: 48
Discharge: HOME | End: 2023-08-02
Attending: INTERNAL MEDICINE
Payer: MEDICARE

## 2023-08-02 DIAGNOSIS — D50.8 IRON DEFICIENCY ANEMIA SECONDARY TO INADEQUATE DIETARY IRON INTAKE: Primary | ICD-10-CM

## 2023-08-02 DIAGNOSIS — E53.8 B12 DEFICIENCY: ICD-10-CM

## 2023-08-02 PROCEDURE — 96365 THER/PROPH/DIAG IV INF INIT: CPT

## 2023-08-02 PROCEDURE — 96372 THER/PROPH/DIAG INJ SC/IM: CPT

## 2023-08-02 RX ORDER — CYANOCOBALAMIN 1000 UG/ML
1000 INJECTION, SOLUTION INTRAMUSCULAR; SUBCUTANEOUS ONCE
Status: CANCELLED | OUTPATIENT
Start: 2023-08-09 | End: 2023-08-09

## 2023-08-02 RX ORDER — CYANOCOBALAMIN 1000 UG/ML
1000 INJECTION, SOLUTION INTRAMUSCULAR; SUBCUTANEOUS ONCE
Status: COMPLETED | OUTPATIENT
Start: 2023-08-02 | End: 2023-08-02

## 2023-08-02 RX ORDER — SODIUM CHLORIDE 9 MG/ML
20 INJECTION, SOLUTION INTRAVENOUS ONCE
Status: COMPLETED | OUTPATIENT
Start: 2023-08-02 | End: 2023-08-02

## 2023-08-02 RX ORDER — SODIUM CHLORIDE 9 MG/ML
20 INJECTION, SOLUTION INTRAVENOUS ONCE
Status: CANCELLED | OUTPATIENT
Start: 2023-08-09

## 2023-08-02 RX ADMIN — CYANOCOBALAMIN 1000 MCG: 1000 INJECTION, SOLUTION INTRAMUSCULAR at 15:21

## 2023-08-02 RX ADMIN — IRON SUCROSE 200 MG: 20 INJECTION, SOLUTION INTRAVENOUS at 15:18

## 2023-08-02 RX ADMIN — SODIUM CHLORIDE 20 ML/HR: 0.9 INJECTION, SOLUTION INTRAVENOUS at 15:18

## 2023-08-09 ENCOUNTER — HOSPITAL ENCOUNTER (OUTPATIENT)
Dept: INFUSION CENTER | Facility: HOSPITAL | Age: 48
Discharge: HOME | End: 2023-08-09
Attending: INTERNAL MEDICINE
Payer: MEDICARE

## 2023-08-09 VITALS
HEART RATE: 59 BPM | RESPIRATION RATE: 16 BRPM | TEMPERATURE: 98.7 F | SYSTOLIC BLOOD PRESSURE: 126 MMHG | OXYGEN SATURATION: 100 % | DIASTOLIC BLOOD PRESSURE: 80 MMHG

## 2023-08-09 DIAGNOSIS — E53.8 B12 DEFICIENCY: ICD-10-CM

## 2023-08-09 DIAGNOSIS — D50.8 IRON DEFICIENCY ANEMIA SECONDARY TO INADEQUATE DIETARY IRON INTAKE: Primary | ICD-10-CM

## 2023-08-09 PROCEDURE — 96372 THER/PROPH/DIAG INJ SC/IM: CPT

## 2023-08-09 PROCEDURE — 96365 THER/PROPH/DIAG IV INF INIT: CPT

## 2023-08-09 RX ORDER — SODIUM CHLORIDE 9 MG/ML
20 INJECTION, SOLUTION INTRAVENOUS ONCE
Status: COMPLETED | OUTPATIENT
Start: 2023-08-09 | End: 2023-08-09

## 2023-08-09 RX ORDER — SODIUM CHLORIDE 9 MG/ML
20 INJECTION, SOLUTION INTRAVENOUS ONCE
Status: CANCELLED | OUTPATIENT
Start: 2023-08-16

## 2023-08-09 RX ORDER — CYANOCOBALAMIN 1000 UG/ML
1000 INJECTION, SOLUTION INTRAMUSCULAR; SUBCUTANEOUS ONCE
Status: COMPLETED | OUTPATIENT
Start: 2023-08-09 | End: 2023-08-09

## 2023-08-09 RX ORDER — CYANOCOBALAMIN 1000 UG/ML
1000 INJECTION, SOLUTION INTRAMUSCULAR; SUBCUTANEOUS ONCE
Status: CANCELLED | OUTPATIENT
Start: 2023-08-16 | End: 2023-08-16

## 2023-08-09 RX ADMIN — CYANOCOBALAMIN 1000 MCG: 1000 INJECTION, SOLUTION INTRAMUSCULAR at 15:21

## 2023-08-09 RX ADMIN — IRON SUCROSE 200 MG: 20 INJECTION, SOLUTION INTRAVENOUS at 15:21

## 2023-08-09 RX ADMIN — SODIUM CHLORIDE 20 ML/HR: 0.9 INJECTION, SOLUTION INTRAVENOUS at 15:19

## 2023-08-09 NOTE — PROGRESS NOTES
Pt offers no complaints. Tolerated venofer and vit b12 injection well without incident. Discharged in stable condition and pt aware of next infusion appointment. AVS declined.

## 2023-08-16 ENCOUNTER — HOSPITAL ENCOUNTER (OUTPATIENT)
Dept: INFUSION CENTER | Facility: HOSPITAL | Age: 48
Discharge: HOME | End: 2023-08-16
Attending: INTERNAL MEDICINE
Payer: MEDICARE

## 2023-08-16 VITALS
HEART RATE: 52 BPM | SYSTOLIC BLOOD PRESSURE: 125 MMHG | TEMPERATURE: 98 F | DIASTOLIC BLOOD PRESSURE: 83 MMHG | RESPIRATION RATE: 16 BRPM

## 2023-08-16 DIAGNOSIS — D50.8 IRON DEFICIENCY ANEMIA SECONDARY TO INADEQUATE DIETARY IRON INTAKE: Primary | ICD-10-CM

## 2023-08-16 DIAGNOSIS — E53.8 B12 DEFICIENCY: ICD-10-CM

## 2023-08-16 PROCEDURE — 96365 THER/PROPH/DIAG IV INF INIT: CPT

## 2023-08-16 PROCEDURE — 96372 THER/PROPH/DIAG INJ SC/IM: CPT

## 2023-08-16 RX ORDER — SODIUM CHLORIDE 9 MG/ML
20 INJECTION, SOLUTION INTRAVENOUS ONCE
Status: CANCELLED | OUTPATIENT
Start: 2023-08-23

## 2023-08-16 RX ORDER — SODIUM CHLORIDE 9 MG/ML
20 INJECTION, SOLUTION INTRAVENOUS ONCE
Status: COMPLETED | OUTPATIENT
Start: 2023-08-16 | End: 2023-08-16

## 2023-08-16 RX ORDER — CYANOCOBALAMIN 1000 UG/ML
1000 INJECTION, SOLUTION INTRAMUSCULAR; SUBCUTANEOUS ONCE
Status: COMPLETED | OUTPATIENT
Start: 2023-08-16 | End: 2023-08-16

## 2023-08-16 RX ORDER — CYANOCOBALAMIN 1000 UG/ML
1000 INJECTION, SOLUTION INTRAMUSCULAR; SUBCUTANEOUS ONCE
Status: CANCELLED | OUTPATIENT
Start: 2023-08-23 | End: 2023-08-23

## 2023-08-16 RX ADMIN — IRON SUCROSE 200 MG: 20 INJECTION, SOLUTION INTRAVENOUS at 15:15

## 2023-08-16 RX ADMIN — SODIUM CHLORIDE 20 ML/HR: 0.9 INJECTION, SOLUTION INTRAVENOUS at 15:12

## 2023-08-16 RX ADMIN — CYANOCOBALAMIN 1000 MCG: 1000 INJECTION, SOLUTION INTRAMUSCULAR at 15:53

## 2023-08-16 NOTE — PROGRESS NOTES
Pt offers no complaints. Tolerated venofer and vit b12 well without incident. Discharged in stable condition and pt aware of next infusion appointment. AVS declined.

## 2023-08-23 ENCOUNTER — HOSPITAL ENCOUNTER (OUTPATIENT)
Dept: INFUSION CENTER | Facility: HOSPITAL | Age: 48
Discharge: HOME | End: 2023-08-23
Attending: INTERNAL MEDICINE
Payer: MEDICARE

## 2023-08-23 VITALS
SYSTOLIC BLOOD PRESSURE: 139 MMHG | OXYGEN SATURATION: 98 % | HEART RATE: 60 BPM | RESPIRATION RATE: 18 BRPM | TEMPERATURE: 97.4 F | DIASTOLIC BLOOD PRESSURE: 63 MMHG

## 2023-08-23 DIAGNOSIS — E53.8 B12 DEFICIENCY: ICD-10-CM

## 2023-08-23 DIAGNOSIS — D50.8 IRON DEFICIENCY ANEMIA SECONDARY TO INADEQUATE DIETARY IRON INTAKE: Primary | ICD-10-CM

## 2023-08-23 PROCEDURE — 96372 THER/PROPH/DIAG INJ SC/IM: CPT

## 2023-08-23 PROCEDURE — 96365 THER/PROPH/DIAG IV INF INIT: CPT

## 2023-08-23 RX ORDER — SODIUM CHLORIDE 9 MG/ML
20 INJECTION, SOLUTION INTRAVENOUS ONCE
Status: COMPLETED | OUTPATIENT
Start: 2023-08-23 | End: 2023-08-23

## 2023-08-23 RX ORDER — SODIUM CHLORIDE 9 MG/ML
20 INJECTION, SOLUTION INTRAVENOUS ONCE
Status: CANCELLED | OUTPATIENT
Start: 2023-08-23

## 2023-08-23 RX ORDER — CYANOCOBALAMIN 1000 UG/ML
1000 INJECTION, SOLUTION INTRAMUSCULAR; SUBCUTANEOUS ONCE
Status: CANCELLED | OUTPATIENT
Start: 2023-08-23 | End: 2023-08-23

## 2023-08-23 RX ORDER — CYANOCOBALAMIN 1000 UG/ML
1000 INJECTION, SOLUTION INTRAMUSCULAR; SUBCUTANEOUS ONCE
Status: COMPLETED | OUTPATIENT
Start: 2023-08-23 | End: 2023-08-23

## 2023-08-23 RX ADMIN — SODIUM CHLORIDE 20 ML/HR: 0.9 INJECTION, SOLUTION INTRAVENOUS at 10:24

## 2023-08-23 RX ADMIN — IRON SUCROSE 200 MG: 20 INJECTION, SOLUTION INTRAVENOUS at 10:26

## 2023-08-23 RX ADMIN — CYANOCOBALAMIN 1000 MCG: 1000 INJECTION, SOLUTION INTRAMUSCULAR at 10:26

## 2023-09-02 ENCOUNTER — OFFICE VISIT (OUTPATIENT)
Dept: URGENT CARE | Facility: CLINIC | Age: 48
End: 2023-09-02

## 2023-09-02 VITALS
DIASTOLIC BLOOD PRESSURE: 76 MMHG | SYSTOLIC BLOOD PRESSURE: 124 MMHG | BODY MASS INDEX: 23.92 KG/M2 | HEIGHT: 63 IN | TEMPERATURE: 97.4 F | RESPIRATION RATE: 18 BRPM | HEART RATE: 60 BPM | OXYGEN SATURATION: 95 % | WEIGHT: 135 LBS

## 2023-09-02 DIAGNOSIS — J02.9 SORE THROAT: Primary | ICD-10-CM

## 2023-09-02 DIAGNOSIS — R09.89 GLOBUS SENSATION: ICD-10-CM

## 2023-09-02 LAB
S PYO AG THROAT QL: NEGATIVE
SARS-COV-2 AG UPPER RESP QL IA: NEGATIVE
VALID CONTROL: NORMAL

## 2023-09-02 PROCEDURE — 87147 CULTURE TYPE IMMUNOLOGIC: CPT | Performed by: PHYSICIAN ASSISTANT

## 2023-09-02 PROCEDURE — 87070 CULTURE OTHR SPECIMN AEROBIC: CPT | Performed by: PHYSICIAN ASSISTANT

## 2023-09-02 PROCEDURE — 99213 OFFICE O/P EST LOW 20 MIN: CPT | Performed by: PHYSICIAN ASSISTANT

## 2023-09-02 PROCEDURE — 87880 STREP A ASSAY W/OPTIC: CPT | Performed by: PHYSICIAN ASSISTANT

## 2023-09-02 PROCEDURE — 87811 SARS-COV-2 COVID19 W/OPTIC: CPT | Performed by: PHYSICIAN ASSISTANT

## 2023-09-02 NOTE — PROGRESS NOTES
[facility]      NAME: Elio Saab is a 49 y.o. female  : 1975    MRN: [id number]  DATE: 2023  TIME: 9:16 AM    Assessment and Plan   Sore throat [J02.9]  1. Sore throat  POCT rapid strepA    Poct Covid 19 Rapid Antigen Test      2. Globus sensation  Ambulatory Referral to Otolaryngology          Patient Instructions   Rapid covid negative. Rapid strep test completed and negative. Will send for culture and call patient if positive. Due to globus sensation will refer to ENT for further evaluation. Patient agreeable to plan. To present to the ER if symptoms worsen. Chief Complaint     Chief Complaint   Patient presents with   • Sore Throat     X 2 days. History of Present Illness   Shweta Anderson presents to the clinic c/o    Sore Throat   This is a new problem. The current episode started yesterday. The problem has been unchanged. The pain is worse on the right side. There has been no fever. The pain is at a severity of 2/10. The pain is mild. Associated symptoms include trouble swallowing (feels like something is stuck in throat). Pertinent negatives include no abdominal pain, congestion, coughing, ear discharge, ear pain, headaches or shortness of breath. She has tried gargles for the symptoms. The treatment provided no relief. Review of Systems   Review of Systems   Constitutional: Negative for chills, diaphoresis, fatigue and fever. HENT: Positive for sore throat and trouble swallowing (feels like something is stuck in throat). Negative for congestion, ear discharge, ear pain and facial swelling. Eyes: Negative for photophobia, pain, discharge, redness, itching and visual disturbance. Respiratory: Negative for apnea, cough, chest tightness, shortness of breath and wheezing. Cardiovascular: Negative for chest pain and palpitations. Gastrointestinal: Negative for abdominal pain. Skin: Negative for color change, rash and wound.    Neurological: Negative for dizziness and headaches. Hematological: Negative for adenopathy.          Current Medications     Long-Term Medications   Medication Sig Dispense Refill   • ascorbic acid (VITAMIN C) 250 MG CHEW Chew 250 mg daily     • calcium citrate-vitamin D (CITRACAL+D) 315-200 MG-UNIT per tablet Take 1 tablet by mouth 2 (two) times a day     • famotidine (PEPCID) 40 MG tablet take 1 tablet by mouth once daily 30 tablet 2   • ferrous gluconate 324 (37.5 Fe) mg Take 324 mg by mouth 2 (two) times a day before meals     • folic acid (FOLVITE) 1 mg tablet Take 2 tablets (2 mg total) by mouth daily 180 tablet 3   • omeprazole (PriLOSEC) 40 MG capsule take 1 capsule by mouth twice a day 60 capsule 2   • acetaZOLAMIDE (DIAMOX) 250 mg tablet take 1 tablet by mouth three times a day (Patient not taking: Reported on 9/2/2023) 45 tablet 0   • furosemide (LASIX) 20 mg tablet take 1 tablet by mouth twice a day (Patient not taking: Reported on 9/2/2023) 60 tablet 1       Current Allergies     Allergies as of 09/02/2023 - Reviewed 09/02/2023   Allergen Reaction Noted   • Benadryl [diphenhydramine] Anaphylaxis 12/28/2015   • Phenergan [promethazine] Anaphylaxis 03/11/2017            The following portions of the patient's history were reviewed and updated as appropriate: allergies, current medications, past family history, past medical history, past social history, past surgical history and problem list.  Past Medical History:   Diagnosis Date   • Abdominal pain    • Acute cystitis with hematuria 1/3/2020   • Brain condition     Pseudotumor Cerebri    • Chronic kidney disease    • COVID-19 virus infection 11/10/2022   • De Quervain's disease (tenosynovitis)    • Esophageal perforation    • Gastric leak    • GERD (gastroesophageal reflux disease)    • Headache    • Idiopathic intracranial hypertension    • Kidney stone    • Migraine    • Moderate protein-calorie malnutrition ([address]) 2/21/2018   • No blood products     per pt: personal and [employer] beliefs. surgeon office aware 12/13/19   • Papilledema, both eyes    • PONV (postoperative nausea and vomiting)    • Postgastrectomy malabsorption    • Presence of lumboperitoneal shunt     Resolved: Sep 20, 2017   • Rotator cuff tendinitis     Resolved: Aug 23, 2017   • Visual field defect      Past Surgical History:   Procedure Laterality Date   • BREAST BIOPSY Left 1993    benign   • CSF SHUNT      LP shunt - 2015 -  shunt - 2017   • ESOPHAGOGASTRODUODENOSCOPY N/A 02/23/2018    Procedure: ESOPHAGOGASTRODUODENOSCOPY (EGD) WITH ESOPHAGEAL STENT PLACEMENT;  Surgeon: Clarisse Trinidad MD;  [facility];  Service: Thoracic   • ESOPHAGOGASTRODUODENOSCOPY N/A 02/23/2018    Procedure: ESOPHAGOGASTRODUODENOSCOPY (EGD) WITH REMOVAL ESOPHAGEAL STENT  AND REPLACEMENT WITH 23mm X155mm [address];  Surgeon: Clarisse Trinidad MD;  [facility];  Service: Thoracic   • ESOPHAGOGASTRODUODENOSCOPY N/A 03/28/2018    Procedure: ESOPHAGOGASTRODUODENOSCOPY (EGD) with PEJ placement.;  Surgeon: Kathe Rod MD;  [facility]: [facility]; Service: Gastroenterology   • ESOPHAGOGASTRODUODENOSCOPY N/A 04/05/2018    Procedure: ESOPHAGOGASTRODUODENOSCOPY (EGD) with botox injection and kaofed placement;  Surgeon: Cindy Day DO;  [facility]; Service: Gastroenterology   • ESOPHAGOGASTRODUODENOSCOPY N/A 04/10/2018    Procedure: ESOPHAGOGASTRODUODENOSCOPY (EGD) with Kaofed placement;  Surgeon: Moy Fuller MD;  [facility]; Service: Gastroenterology   • ESOPHAGOSCOPY WITH STENT INSERTION N/A 01/24/2018    Procedure: INSERTION STENT ESOPHAGEAL;  Surgeon: Natacha Berrios MD;  [facility];   Service: Gastroenterology   • EYE SURGERY Bilateral 1980    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)    • FL RETROGRADE PYELOGRAM  06/24/2020   • FL RETROGRADE PYELOGRAM  08/21/2021   • FL RETROGRADE PYELOGRAM  5/4/2023   • FL RETROGRADE PYELOGRAM  5/18/2023   • GASTRIC BYPASS 12/19/2016    Lap sleeve gastrectomy w/shunt length shortening procedure   • HIATAL HERNIA REPAIR     • HYSTERECTOMY  03/14/2013   • IR LUMBAR PUNCTURE  08/29/2018   • LAPAROTOMY N/A 01/25/2018    Procedure: Exploratory Laparotomy, wash out,placement of drains, placement of NG feeding tube ;   Surgeon: Maureen Kurtz DO;  [facility];  Service: General   • LUMBAR PERITONEAL SHUNT  11/19/2015    Laparoscopic assisted   • ID BREAST REDUCTION Bilateral 03/25/2022    Procedure: BILATERAL BREAST REDUCTION;  Surgeon: Lisa Ken MD;  [facility];  Service: Plastics   • ID CRTJ SHUNT [id number] Right 12/18/2019    Procedure: IMAGE GUIDED INSERTION OF RIGHT CORONAL VENTRICULAR-ATRIAL SHUNT;  Surgeon: Antonio Alexandra MD;  [facility];  Service: Neurosurgery   • ID CRTJ SHUNT [id number] TERMINUS Right 05/31/2017    Procedure: IMAGE GUIDED CORONAL PLACEMENT OF PROGRAMABLE VENTRICULAR-PERITONEAL SHUNT, REMOVAL OF LP SHUNT ;  Surgeon: Antonio Alexandra MD;  [facility];  Service: Neurosurgery   • ID CYSTO BLADDER W/URETERAL CATHETERIZATION N/A 06/06/2020    Procedure: Bilateral CYSTOSCOPY RETROGRADE PYELOGRAM WITH INSERTION STENT URETERAL;  Surgeon: Erica Posey MD;  Location: [facility];  Service: Urology   • ID CYSTO BLADDER W/URETERAL CATHETERIZATION Bilateral 5/4/2023    Procedure: CYSTOSCOPY RETROGRADE PYELOGRAM WITH INSERTION STENT URETERAL;  Surgeon: Tiburcio Wallace MD;  [facility];  Service: Urology   • ID CYSTO/URETERO W/LITHOTRIPSY &INDWELL STENT INSRT Bilateral 06/24/2020    Procedure: CYSTOSCOPY URETEROSCOPY WITH LITHOTRIPSY HOLMIUM LASER, RETROGRADE PYELOGRAM AND exchange bilateral  STENTs URETERAL;  Surgeon: Tristen Fink MD;  [facility];  Service: Urology   • ID CYSTO/URETERO W/LITHOTRIPSY &INDWELL STENT INSRT Left 08/21/2021    Procedure: CYSTOSCOPY; LEFT URETEROSCOPY WITH RETROGRADE PYELOGRAM, REMOVAL OF STONE AND INSERTION LEFT URETERAL STENT;  Surgeon: Marce Mcnair MD;  [facility];  Service: Urology   • MI CYSTO/URETERO W/LITHOTRIPSY &INDWELL STENT INSRT N/A 2023    Procedure: CYSTOSCOPY URETEROSCOPY WITH LITHOTRIPSY HOLMIUM LASER, RETROGRADE PYELOGRAM, REMOVAL OF BILATERAL STENTS,  AND INSERTION STENT URETERAL LEFT ,BASKET EXTRACTION OF STONE LEFT SIDE;  Surgeon: Vera Carlton MD;  [facility];  Service: Urology   • MI EGD TRANSORAL BIOPSY SINGLE/MULTIPLE N/A 2018    Procedure: ESOPHAGOGASTRODUODENOSCOPY (EGD) with padlock clip placement;  Surgeon: Tim Jade MD;  [facility];   Service: Bariatrics   • MI RMVL COMPL CSF SHUNT SYSTEM W/O RPLCMT SHUNT Right 2018    Procedure: Removal of  shunt;  Surgeon: Krystal Feldman MD;  [facility];  Service: Neurosurgery   • MI RPLCMT/REVJ CSF SHUNT VALVE/CATH SHUNT SYS Right 2018    Procedure: Externalization of right-sided SHUNT VENTRICULAR-PERITONEAL in anterior chest wall ribs two and three level  ;  Surgeon: Senthil Ernandez MD;  [facility];  Service: Neurosurgery   • REDUCTION MAMMAPLASTY Bilateral    • WRIST SURGERY Right     x3 ,      Social History     Socioeconomic History   • Marital status: Single     Spouse name: Not on file   • Number of children: 2   • Years of education: High School or GED    • Highest education level: Not on file   Occupational History   • Occupation: employed    Tobacco Use   • Smoking status: Former     Packs/day: 0.50     Years: 20.00     Total pack years: 10.00     Types: Cigarettes     Start date: 1992     Quit date: 2012     Years since quittin.0   • Smokeless tobacco: Never   Vaping Use   • Vaping Use: Every day   • Substances: THC, CBD   Substance and Sexual Activity   • Alcohol use: Never   • Drug use: Yes     Frequency: 7.0 times per week     Types: Marijuana     Comment: medical marijuana, vaping & topical    • Sexual activity: Yes Other Topics Concern   • Not on file   Social History Narrative    ** Merged History Encounter **          Social Determinants of Health     Financial Resource Strain: Not on file   Food Insecurity: No Food Insecurity (5/4/2023)    Hunger Vital Sign    • Worried About Running Out of Food in the Last Year: Never true    • Ran Out of Food in the Last Year: Never true   Transportation Needs: No Transportation Needs (5/4/2023)    PRAPARE - Transportation    • Lack of Transportation (Medical): No    • Lack of Transportation (Non-Medical): No   Physical Activity: Not on file   Stress: Not on file   Social Connections: Not on file   Intimate Partner Violence: Not on file   Housing Stability: Low Risk  (5/4/2023)    Housing Stability Vital Sign    • Unable to Pay for Housing in the Last Year: No    • Number of Places Lived in the Last Year: 1    • Unstable Housing in the Last Year: No       Objective   /76   Pulse 60   Temp (!) 97.4 °F (36.3 °C) (Temporal)   Resp 18   Ht 5' 3" (1.6 m)   Wt 61.2 kg (135 lb)   LMP  (LMP Unknown)   SpO2 95%   BMI 23.91 kg/m²      Physical Exam     Physical Exam  Vitals and nursing note reviewed. Constitutional:       General: She is not in acute distress. Appearance: She is well-developed. She is not diaphoretic. HENT:      Head: Normocephalic and atraumatic. Right Ear: Tympanic membrane and external ear normal.      Left Ear: Tympanic membrane and external ear normal.      Nose: Nose normal.      Mouth/Throat:      Mouth: Mucous membranes are moist.      Pharynx: Posterior oropharyngeal erythema present. No oropharyngeal exudate. Tonsils: No tonsillar exudate. Eyes:      General: No scleral icterus. Right eye: No discharge. Left eye: No discharge. Conjunctiva/sclera: Conjunctivae normal.   Cardiovascular:      Rate and Rhythm: Normal rate and regular rhythm. Heart sounds: Normal heart sounds. No murmur heard. No friction rub. No gallop. Pulmonary:      Effort: Pulmonary effort is normal. No respiratory distress. Breath sounds: Normal breath sounds. No decreased breath sounds, wheezing, rhonchi or rales. Skin:     General: Skin is warm and dry. Coloration: Skin is not pale. Findings: No erythema or rash. Neurological:      Mental Status: She is alert and oriented to person, place, and time. Psychiatric:         Behavior: Behavior normal.         Thought Content:  Thought content normal.         Judgment: Judgment normal.         Jazmyn Galvan PA-C

## 2023-09-03 ENCOUNTER — HOSPITAL ENCOUNTER (EMERGENCY)
Facility: HOSPITAL | Age: 48
Discharge: HOME | End: 2023-09-03
Attending: FAMILY MEDICINE
Payer: COMMERCIAL

## 2023-09-03 VITALS
TEMPERATURE: 97.8 F | BODY MASS INDEX: 23.92 KG/M2 | RESPIRATION RATE: 18 BRPM | SYSTOLIC BLOOD PRESSURE: 118 MMHG | WEIGHT: 135 LBS | HEART RATE: 61 BPM | OXYGEN SATURATION: 98 % | DIASTOLIC BLOOD PRESSURE: 79 MMHG | HEIGHT: 63 IN

## 2023-09-03 DIAGNOSIS — J35.8 TONSIL STONE: Primary | ICD-10-CM

## 2023-09-03 LAB — BACTERIA THROAT CULT: NORMAL

## 2023-09-03 PROCEDURE — 99284 EMERGENCY DEPT VISIT MOD MDM: CPT | Performed by: PHYSICIAN ASSISTANT

## 2023-09-03 PROCEDURE — 99282 EMERGENCY DEPT VISIT SF MDM: CPT

## 2023-09-03 NOTE — ED PROVIDER NOTES
History  Chief Complaint   Patient presents with   • Sore Throat     42-year-old female presents the emergency department seek evaluation for evaluation of unilateral sore throat on the right side. Patient states that she feels that she may have something stuck in her throat. Overall she is well-appearing no acute distress. She was recently seen at an urgent care and was given a referral to ENT. Patient reports observing a whitish material on her right tonsil. No known sick contacts    Allergies reviewed          Prior to Admission Medications   Prescriptions Last Dose Informant Patient Reported? Taking? Biotin 1 MG CAPS  Self Yes No   Sig: Take 1 mg by mouth daily     Dihydroergotamine Mesylate HFA (Trudhesa) 0.725 MG/ACT AERS  Self No No   Si.725 mg into each nostril if needed (migraine) Use at onset of migraine. May repeat in 1 hour if needed. Limit of 3 a week or 8 a month. Do not use within 24 hours of a triptan.    Patient not taking: Reported on 2023   Multiple Vitamin (MULTIVITAMIN) tablet  Self Yes No   Sig: Take 1 tablet by mouth daily Wears a patch   Multiple Vitamins-Minerals (Hair/Skin/Nails) CAPS  Self Yes No   Sig: Take 1 tablet by mouth 2 (two) times a day     acetaZOLAMIDE (DIAMOX) 250 mg tablet   No No   Sig: take 1 tablet by mouth three times a day   Patient not taking: Reported on 2023   ascorbic acid (VITAMIN C) 250 MG CHEW  Self Yes No   Sig: Chew 250 mg daily   calcium citrate-vitamin D (CITRACAL+D) 315-200 MG-UNIT per tablet  Self Yes No   Sig: Take 1 tablet by mouth 2 (two) times a day   dexamethasone (DECADRON) 2 mg tablet  Self No No   Sig: Take 1 tablet (2 mg total) by mouth daily with breakfast   Patient not taking: Reported on 2023   famotidine (PEPCID) 40 MG tablet  Self No No   Sig: take 1 tablet by mouth once daily   ferrous gluconate 324 (37.5 Fe) mg  Self Yes No   Sig: Take 324 mg by mouth 2 (two) times a day before meals   folic acid (FOLVITE) 1 mg tablet Self No No   Sig: Take 2 tablets (2 mg total) by mouth daily   furosemide (LASIX) 20 mg tablet   No No   Sig: take 1 tablet by mouth twice a day   Patient not taking: Reported on 9/2/2023   omeprazole (PriLOSEC) 40 MG capsule   No No   Sig: take 1 capsule by mouth twice a day   rimegepant sulfate (Nurtec) 75 mg TBDP   No No   Sig: Take 1 tablet (75 mg total) by mouth as needed (migraine) Limit of 1 in 24 hours   Patient not taking: Reported on 9/2/2023   vitamin B-12 (VITAMIN B-12) 500 mcg tablet  Self Yes No   Sig: Take 500 mcg by mouth daily      Facility-Administered Medications: None       Past Medical History:   Diagnosis Date   • Abdominal pain    • Acute cystitis with hematuria 1/3/2020   • Brain condition     Pseudotumor Cerebri    • Chronic kidney disease    • COVID-19 virus infection 11/10/2022   • De Quervain's disease (tenosynovitis)    • Esophageal perforation    • Gastric leak    • GERD (gastroesophageal reflux disease)    • Headache    • Idiopathic intracranial hypertension    • Kidney stone    • Migraine    • Moderate protein-calorie malnutrition ([address]) 2/21/2018   • No blood products     per pt: personal and Baptism beliefs.  surgeon office aware 12/13/19   • Papilledema, both eyes    • PONV (postoperative nausea and vomiting)    • Postgastrectomy malabsorption    • Presence of lumboperitoneal shunt     Resolved: Sep 20, 2017   • Rotator cuff tendinitis     Resolved: Aug 23, 2017   • Visual field defect        Past Surgical History:   Procedure Laterality Date   • BREAST BIOPSY Left 1993    benign   • CSF SHUNT      LP shunt - 2015 -  shunt - 2017   • ESOPHAGOGASTRODUODENOSCOPY N/A 02/23/2018    Procedure: ESOPHAGOGASTRODUODENOSCOPY (EGD) WITH ESOPHAGEAL STENT PLACEMENT;  Surgeon: Hannah Galvan MD;  [facility];  Service: Thoracic   • ESOPHAGOGASTRODUODENOSCOPY N/A 02/23/2018    Procedure: ESOPHAGOGASTRODUODENOSCOPY (EGD) WITH REMOVAL ESOPHAGEAL STENT  AND REPLACEMENT WITH 23mm X155mm [facility] ESOPHAGEAL STENT;  Surgeon: Doug Montesinos MD;  [facility]: [facility];  Service: Thoracic   • ESOPHAGOGASTRODUODENOSCOPY N/A 03/28/2018    Procedure: ESOPHAGOGASTRODUODENOSCOPY (EGD) with PEJ placement.;  Surgeon: Willow Rojas MD;  [facility]; Service: Gastroenterology   • ESOPHAGOGASTRODUODENOSCOPY N/A 04/05/2018    Procedure: ESOPHAGOGASTRODUODENOSCOPY (EGD) with botox injection and kaofed placement;  Surgeon: Yang Sotomayor DO;  [facility]; Service: Gastroenterology   • ESOPHAGOGASTRODUODENOSCOPY N/A 04/10/2018    Procedure: ESOPHAGOGASTRODUODENOSCOPY (EGD) with Kaofed placement;  Surgeon: Kay Rob MD;  [facility]; Service: Gastroenterology   • ESOPHAGOSCOPY WITH STENT INSERTION N/A 01/24/2018    Procedure: INSERTION STENT ESOPHAGEAL;  Surgeon: Jyoti Waters MD;  [facility]: [facility]; Service: Gastroenterology   • EYE SURGERY Bilateral 1980    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)    • FL RETROGRADE PYELOGRAM  06/24/2020   • FL RETROGRADE PYELOGRAM  08/21/2021   • FL RETROGRADE PYELOGRAM  5/4/2023   • FL RETROGRADE PYELOGRAM  5/18/2023   • GASTRIC BYPASS  12/19/2016    Lap sleeve gastrectomy w/shunt length shortening procedure   • HIATAL HERNIA REPAIR     • HYSTERECTOMY  03/14/2013   • IR LUMBAR PUNCTURE  08/29/2018   • LAPAROTOMY N/A 01/25/2018    Procedure: Exploratory Laparotomy, wash out,placement of drains, placement of NG feeding tube ;   Surgeon: Maureen Kurtz DO;  [facility];  Service: General   • LUMBAR PERITONEAL SHUNT  11/19/2015    Laparoscopic assisted   • TN BREAST REDUCTION Bilateral 03/25/2022    Procedure: BILATERAL BREAST REDUCTION;  Surgeon: Lisa Ken MD;  [facility];  Service: Plastics   • TN CRTJ SHUNT [id number] Right 12/18/2019    Procedure: IMAGE GUIDED INSERTION OF RIGHT CORONAL VENTRICULAR-ATRIAL SHUNT;  Surgeon: Antonio Alexandra MD;  [facility];  Service: Neurosurgery   • TN CRTJ SHUNT [id number] [id number] Right 05/31/2017    Procedure: IMAGE GUIDED CORONAL PLACEMENT OF PROGRAMABLE VENTRICULAR-PERITONEAL SHUNT, REMOVAL OF LP SHUNT ;  Surgeon: Jocelyne Gleason MD;  [facility];  Service: Neurosurgery   • RI CYSTO BLADDER W/URETERAL CATHETERIZATION N/A 06/06/2020    Procedure: Bilateral CYSTOSCOPY RETROGRADE PYELOGRAM WITH INSERTION STENT URETERAL;  Surgeon: Jesus Mera MD;  Location: [address] [place] [address] MAIN OR;  Service: Urology   • RI CYSTO BLADDER W/URETERAL CATHETERIZATION Bilateral 5/4/2023    Procedure: CYSTOSCOPY RETROGRADE PYELOGRAM WITH INSERTION STENT URETERAL;  Surgeon: Spenser De La Cruz MD;  Location[facility];  Service: Urology   • RI CYSTO/URETERO W/LITHOTRIPSY &INDWELL STENT INSRT Bilateral 06/24/2020    Procedure: CYSTOSCOPY URETEROSCOPY WITH LITHOTRIPSY HOLMIUM LASER, RETROGRADE PYELOGRAM AND exchange bilateral  STENTs URETERAL;  Surgeon: Jaydon Rich MD;  [facility];  Service: Urology   • RI CYSTO/URETERO W/LITHOTRIPSY &INDWELL STENT INSRT Left 08/21/2021    Procedure: CYSTOSCOPY; LEFT URETEROSCOPY WITH RETROGRADE PYELOGRAM, REMOVAL OF STONE AND INSERTION LEFT URETERAL STENT;  Surgeon: Marley Lin MD;  [facility];  Service: Urology   • RI CYSTO/URETERO W/LITHOTRIPSY &INDWELL STENT INSRT N/A 5/18/2023    Procedure: CYSTOSCOPY URETEROSCOPY WITH LITHOTRIPSY HOLMIUM LASER, RETROGRADE PYELOGRAM, REMOVAL OF BILATERAL STENTS,  AND INSERTION STENT URETERAL LEFT ,BASKET EXTRACTION OF STONE LEFT SIDE;  Surgeon: Claudeen Acres, MD;  [facility];  Service: Urology   • RI EGD TRANSORAL BIOPSY SINGLE/MULTIPLE N/A 06/27/2018    Procedure: ESOPHAGOGASTRODUODENOSCOPY (EGD) with padlock clip placement;  Surgeon: Evelina Lorenzo MD;  [facility];   Service: Bariatrics   • RI RMVL COMPL CSF SHUNT SYSTEM W/O RPLCMT SHUNT Right 02/05/2018    Procedure: Removal of  shunt;  Surgeon: Jocelyne Gleason MD;  [facility]; Service: Neurosurgery   • LA RPLCMT/REVJ CSF SHUNT VALVE/CATH SHUNT SYS Right 2018    Procedure: Externalization of right-sided SHUNT VENTRICULAR-PERITONEAL in anterior chest wall ribs two and three level  ;  Surgeon: Maureen Small MD;  [facility];  Service: Neurosurgery   • REDUCTION MAMMAPLASTY Bilateral    • WRIST SURGERY Right     x3 ,        Family History   Problem Relation Age of Onset   • Kidney cancer Mother 77   • No Known Problems Father    • No Known Problems Sister    • No Known Problems Daughter    • No Known Problems Maternal Grandmother    • Colon cancer Maternal Grandfather 44   • No Known Problems Paternal Grandmother    • Cancer Paternal Grandfather    • Thyroid cancer Brother 36   • No Known Problems Maternal Aunt    • No Known Problems Maternal Aunt    • No Known Problems Paternal Aunt    • Cancer Family         Gastric   • Leukemia Family    • Colon cancer Family    • Pancreatic cancer Family    • Lung cancer Maternal Uncle 64     I have reviewed and agree with the history as documented. E-Cigarette/Vaping   • E-Cigarette Use Current Every Day User    • Comments medical marijuana      E-Cigarette/Vaping Substances   • Nicotine No    • THC Yes    • CBD Yes    • Flavoring No    • Other Yes lotion   • Unknown No      Social History     Tobacco Use   • Smoking status: Former     Packs/day: 0.50     Years: 20.00     Total pack years: 10.00     Types: Cigarettes     Start date: 1992     Quit date: 2012     Years since quittin.0   • Smokeless tobacco: Never   Vaping Use   • Vaping Use: Every day   • Substances: THC, CBD   Substance Use Topics   • Alcohol use: Never   • Drug use: Yes     Frequency: 7.0 times per week     Types: Marijuana     Comment: medical marijuana, vaping & topical        Review of Systems   Constitutional: Negative for fatigue and fever. HENT: Positive for sore throat.     Respiratory: Negative for chest tightness and shortness of breath. Cardiovascular: Negative for chest pain and leg swelling. Gastrointestinal: Negative for abdominal pain. Genitourinary: Negative for flank pain. Neurological: Negative for weakness. All other systems reviewed and are negative. Physical Exam  Physical Exam  Vitals and nursing note reviewed. Constitutional:       General: She is not in acute distress. Appearance: Normal appearance. She is well-developed and well-groomed. HENT:      Head: Normocephalic and atraumatic. Right Ear: External ear normal.      Left Ear: External ear normal.      Nose: Nose normal.      Mouth/Throat:      Lips: Pink. Mouth: Mucous membranes are moist.      Comments: Tonsilith identified on the right tonsil. Easily removed with cotton swab. Discomfort resolved. Eyes:      General: Lids are normal. Gaze aligned appropriately. Cardiovascular:      Rate and Rhythm: Normal rate. Pulses: Normal pulses. Pulmonary:      Effort: Pulmonary effort is normal. No respiratory distress. Abdominal:      Palpations: Abdomen is soft. Tenderness: There is no abdominal tenderness. Skin:     General: Skin is warm. Capillary Refill: Capillary refill takes less than 2 seconds. Neurological:      General: No focal deficit present. Mental Status: She is alert and oriented to person, place, and time. Mental status is at baseline. Psychiatric:         Behavior: Behavior is cooperative.          Vital Signs  ED Triage Vitals [09/03/23 1028]   Temperature Pulse Respirations Blood Pressure SpO2   97.8 °F (36.6 °C) 61 18 118/79 98 %      Temp Source Heart Rate Source Patient Position - Orthostatic VS BP Location FiO2 (%)   Tympanic Monitor Sitting Right arm --      Pain Score       3           Vitals:    09/03/23 1028   BP: 118/79   Pulse: 61   Patient Position - Orthostatic VS: Sitting         Visual Acuity      ED Medications  Medications - No data to display    Diagnostic Studies  Results Reviewed     None                 No orders to display              Procedures  Procedures         ED Course                                             Medical Decision Making  Tonsillar stone extracted from right tonsil with cotton swab. Immediate resolution of the patient's complaints. Patient discharged. Patient educated regarding their diagnosis and given return and follow-up instructions. Patient was advised to returned to the ED with worsening symptoms or concerns. Patient is understanding of and in agreement with the treatment plan. There are no questions at the time of discharge. Disposition  Final diagnoses: Tonsil stone     Time reflects when diagnosis was documented in both MDM as applicable and the Disposition within this note     Time User Action Codes Description Comment    9/3/2023 11:07 AM Charline Bazan Add [J35.8] Tonsil stone       ED Disposition     ED Disposition   Discharge    Condition   Stable    Date/Time   Sun Sep 3, 2023 11:07 AM    Comment   Gladys Fear discharge to home/self care.                Follow-up Information     Follow up With Specialties Details Why [address] [facility], [address] [facility] [address] [facility] [address] [place] [address]  [phone]            Discharge Medication List as of 9/3/2023 11:07 AM      CONTINUE these medications which have NOT CHANGED    Details   acetaZOLAMIDE (DIAMOX) 250 mg tablet take 1 tablet by mouth three times a day, Normal      ascorbic acid (VITAMIN C) 250 MG CHEW Chew 250 mg daily, Historical Med      Biotin 1 MG CAPS Take 1 mg by mouth daily  , Historical Med      calcium citrate-vitamin D (CITRACAL+D) 315-200 MG-UNIT per tablet Take 1 tablet by mouth 2 (two) times a day, Historical Med      dexamethasone (DECADRON) 2 mg tablet Take 1 tablet (2 mg total) by mouth daily with breakfast, Starting u 6/8/2023, Normal      Dihydroergotamine Mesylate HFA (Trudhesa) 0.725 MG/ACT AERS 0.725 mg into each nostril if needed (migraine) Use at onset of migraine. May repeat in 1 hour if needed. Limit of 3 a week or 8 a month. Do not use within 24 hours of a triptan., Starting u 6/8/2023, Normal      famotidine (PEPCID) 40 MG tablet take 1 tablet by mouth once daily, Normal      ferrous gluconate 324 (37.5 Fe) mg Take 324 mg by mouth 2 (two) times a day before meals, Historical Med      folic acid (FOLVITE) 1 mg tablet Take 2 tablets (2 mg total) by mouth daily, Starting Thu 2/9/2023, Normal      furosemide (LASIX) 20 mg tablet take 1 tablet by mouth twice a day, Normal      Multiple Vitamin (MULTIVITAMIN) tablet Take 1 tablet by mouth daily Wears a patch, Historical Med      Multiple Vitamins-Minerals (Hair/Skin/Nails) CAPS Take 1 tablet by mouth 2 (two) times a day  , Historical Med      omeprazole (PriLOSEC) 40 MG capsule take 1 capsule by mouth twice a day, Normal      rimegepant sulfate (Nurtec) 75 mg TBDP Take 1 tablet (75 mg total) by mouth as needed (migraine) Limit of 1 in 24 hours, Starting Wed 6/21/2023, Normal      vitamin B-12 (VITAMIN B-12) 500 mcg tablet Take 500 mcg by mouth daily, Historical Med             No discharge procedures on file.     PDMP Review       Value Time User    PDMP Reviewed  Yes 6/24/2020  3:21 PM Suzanna Thakkar MD          ED Provider  Electronically Signed by           Perez Powell PA-C  09/03/23 0796

## 2023-09-04 LAB — BACTERIA THROAT CULT: NORMAL

## 2023-09-05 DIAGNOSIS — K21.9 GASTROESOPHAGEAL REFLUX DISEASE WITHOUT ESOPHAGITIS: ICD-10-CM

## 2023-09-05 RX ORDER — FAMOTIDINE 40 MG/1
TABLET, FILM COATED ORAL
Qty: 30 TABLET | Refills: 2 | Status: SHIPPED | OUTPATIENT
Start: 2023-09-05

## 2023-09-26 ENCOUNTER — OFFICE VISIT (OUTPATIENT)
Dept: BARIATRICS | Facility: CLINIC | Age: 48
End: 2023-09-26
Payer: MEDICARE

## 2023-09-26 VITALS
BODY MASS INDEX: 25.87 KG/M2 | HEIGHT: 63 IN | TEMPERATURE: 97.9 F | HEART RATE: 65 BPM | DIASTOLIC BLOOD PRESSURE: 82 MMHG | SYSTOLIC BLOOD PRESSURE: 126 MMHG | WEIGHT: 146 LBS

## 2023-09-26 DIAGNOSIS — K21.9 GERD (GASTROESOPHAGEAL REFLUX DISEASE): ICD-10-CM

## 2023-09-26 DIAGNOSIS — Z98.84 BARIATRIC SURGERY STATUS: ICD-10-CM

## 2023-09-26 DIAGNOSIS — Z48.815 ENCOUNTER FOR SURGICAL AFTERCARE FOLLOWING SURGERY OF DIGESTIVE SYSTEM: Primary | ICD-10-CM

## 2023-09-26 DIAGNOSIS — K91.2 POSTSURGICAL MALABSORPTION: ICD-10-CM

## 2023-09-26 PROCEDURE — 99214 OFFICE O/P EST MOD 30 MIN: CPT | Performed by: NURSE PRACTITIONER

## 2023-09-26 RX ORDER — DEXLANSOPRAZOLE 30 MG/1
30 CAPSULE, DELAYED RELEASE ORAL DAILY
Qty: 90 CAPSULE | Refills: 3 | Status: SHIPPED | OUTPATIENT
Start: 2023-09-26

## 2023-09-26 NOTE — PROGRESS NOTES
Assessment/Plan:     Patient ID: Tracey De La Torre is a 51 y.o. female. Bariatric Surgery Status /GERD  -s/p Vertical Sleeve Gastrectomy with Dr. 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 Had EGD with Stretta with Dr. Marybel Nava on 02/05/2020.      Presents to the office today for annual visit. Overall doing fair, still having reflux concerns. Having associated nausea and vomiting. Having regurgitation of her food and liquids. Unable to tolerate water; has to try sparking/carbonation fluids. Feels her quality of life is poor from her uncontrolled acid reflux. She is currently on omeprazole 40 mg PO BID and pepcid 40 mg PO QHS and is taking tums PRN to help relieve her symptoms. She notes when she was taking trial dexilant she noticed improvement in her symptoms. PLAN:     - given significant surgical history - poor surgical candidate to convert to RNYGB. She is interested in another stretta procedure - did discuss a portion was avoided due to her clip. - she would like to defer any surgical interventions for now due to her fear of complications happening again. - continue with current management for now. Will try to get dexilant approve for her and taper dose accordingly.   - Routine follow up in 1 year for annual visit. Come into office sooner if she has issues. - Continue with healthy lifestyle, adequate protein intake of 60 gm, fluid intake of at least 64 oz. - Continue with MVI daily. - Activity as tolerated. - Labs ordered and will adjust accordingly if any deficiency. - Follow up with RD and SW as needed. Vitamin B12 deficiency - was receiving IV iron infusions and B12 shots. She is taking oral B12 as well in sublingual form. Having associated fatigue. Will repeat B12 level and consider increasing shot frequency. Advised to increase her sublingual form of B12 to 0638-1483 mcg daily. She will check her current dose at home. · Continued/Maintain healthy weight loss with good nutrition intakes. · Adequate hydration with at least 64oz. fluid intake. · Follow diet as discussed. · Follow vitamin and mineral recommendations as reviewed with you. · Exercise as tolerated. · Colonoscopy referral made: DUE   · Mammogram - UTD  · Sleeve screening- UTD - due in 2026     · Follow-up in 1 year for annual visit. We kindly ask that your arrive 15 minutes before your scheduled appointment time with your provider to allow our staff to room you, get your vital signs and update your chart. · Get lab work done prior to annual visit. Please call the office if you need a script. It is recommended to check with your insurance BEFORE getting labs done to make sure they are covered by your policy. · Call our office if you have any problems with abdominal pain especially associated with fever, chills, nausea, vomiting or any other concerns. · All  Post-bariatric surgery patients should be aware that very small quantities of any alcohol can cause impairment and it is very possible not to feel the effect. The effect can be in the system for several hours. It is also a stomach irritant. · It is advised to AVOID alcohol, Nonsteroidal antiinflammatory drugs (NSAIDS) and nicotine of all forms . Any of these can cause stomach irritation/pain. · Discussed the effects of alcohol on a bariatric patient and the increased impairment risk. · Keep up the good work! Postsurgical Malabsorption   -At risk for malabsorption of vitamins/minerals secondary to malabsorption and restriction of intake from bariatric surgery  -Currently taking adequate postop bariatric surgery vitamin supplementation  -Last set of bariatric labs completed on 12/07/2022 and showed low vitamin B12.  Other vitamin levels are within limits.   -Next set of bariatric labs ordered for approximately 3 months  -Patient received education about the importance of adhering to a lifelong supplementation regimen to avoid vitamin/mineral deficiencies      Diagnoses and all orders for this visit:    Encounter for surgical aftercare following surgery of digestive system  -     Zinc; Future  -     Vitamin B1, whole blood; Future  -     Vitamin A; Future  -     PTH, intact; Future  -     dexlansoprazole (DEXILANT) 30 MG capsule; Take 1 capsule (30 mg total) by mouth daily  -     Vitamin B12; Future  -     Methylmalonic acid, serum; Future    Bariatric surgery status  -     Zinc; Future  -     Vitamin B1, whole blood; Future  -     Vitamin A; Future  -     PTH, intact; Future  -     dexlansoprazole (DEXILANT) 30 MG capsule; Take 1 capsule (30 mg total) by mouth daily  -     Vitamin B12; Future  -     Methylmalonic acid, serum; Future    Postsurgical malabsorption  -     Zinc; Future  -     Vitamin B1, whole blood; Future  -     Vitamin A; Future  -     PTH, intact; Future  -     dexlansoprazole (DEXILANT) 30 MG capsule; Take 1 capsule (30 mg total) by mouth daily  -     Vitamin B12; Future  -     Methylmalonic acid, serum; Future    GERD (gastroesophageal reflux disease)  -     dexlansoprazole (DEXILANT) 30 MG capsule; Take 1 capsule (30 mg total) by mouth daily    BMI 25.0-25.9,adult  -     Zinc; Future  -     Vitamin B1, whole blood; Future  -     Vitamin A; Future  -     PTH, intact; Future  -     dexlansoprazole (DEXILANT) 30 MG capsule; Take 1 capsule (30 mg total) by mouth daily  -     Vitamin B12; Future  -     Methylmalonic acid, serum; Future         Subjective:      Patient ID: Roz Soares is a 51 y.o. female. -s/p Vertical Sleeve Gastrectomy with Dr. 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 Had EGD with Bridgetta with Dr. John Chávez on 02/05/2020.      Presents to the office today for annual visit. Overall doing fair, still having reflux concerns. She is currently on omeprazole 40 mg PO BID and pepcid 40 mg PO QHS and is taking tums PRN to help relieve her symptoms. She notes when she was taking trial dexilant she noticed improvement in her symptoms. Initial: 213 lbs (prior to sleeve); 137 lbs (prior to stretta)  Current: 137 LBS  EWL: (Weight loss is ahead of schedule at this post surgical period.)  Keron: 80-85 lbs  Current BMI is Body mass index is 25.86 kg/m². · Tolerating a regular diet-yes  · Eating at least 60 grams of protein per day-yes  · Following 30/60 minute rule with liquids-yes  · Drinking at least 64 ounces of fluid per day-yes  · Drinking carbonated beverages-yes  · Sufficient exercise-yes  · Using NSAIDs regularly-no  · Using nicotine-no  · Using alcohol-no  · Supplements: Multivitamins and b12, iron chews, vitamin B12 shots, biotin. + vitamin D   ·     · EWL is 161%, which places the patient ahead of schedule for expected post surgical weight loss at this time. The following portions of the patient's history were reviewed and updated as appropriate: allergies, current medications, past family history, past medical history, past social history, past surgical history and problem list.    Review of Systems   Constitutional: Positive for fatigue. Respiratory: Negative. Cardiovascular: Negative. Gastrointestinal:        GERD     Neurological: Negative. Psychiatric/Behavioral: Negative. Objective:    /82   Pulse 65   Temp 97.9 °F (36.6 °C) (Tympanic)   Ht 5' 3" (1.6 m)   Wt 66.2 kg (146 lb)   LMP  (LMP Unknown)   BMI 25.86 kg/m²      Physical Exam  Vitals and nursing note reviewed. Constitutional:       Appearance: Normal appearance. She is normal weight. Cardiovascular:      Rate and Rhythm: Normal rate and regular rhythm.       Pulses: Normal pulses. Heart sounds: Normal heart sounds. Pulmonary:      Effort: Pulmonary effort is normal.      Breath sounds: Normal breath sounds. Abdominal:      General: Bowel sounds are normal.      Palpations: Abdomen is soft. Tenderness: There is no abdominal tenderness. Musculoskeletal:         General: Normal range of motion. Skin:     General: Skin is warm and dry. Neurological:      General: No focal deficit present. Mental Status: She is alert and oriented to person, place, and time. Psychiatric:         Mood and Affect: Mood normal.         Behavior: Behavior normal.         Thought Content:  Thought content normal.         Judgment: Judgment normal.

## 2023-10-06 ENCOUNTER — TELEPHONE (OUTPATIENT)
Dept: HEMATOLOGY ONCOLOGY | Facility: CLINIC | Age: 48
End: 2023-10-06

## 2023-10-06 NOTE — TELEPHONE ENCOUNTER
Appointment Change  Cancel, Reschedule, Change to Virtual      Who are you speaking with? Patient   If it is not the patient, is the caller listed on the communication consent form? N/A   Which provider is the appointment scheduled with? ABNER Hannah   When was the original appointment scheduled? Please list date and time 12/12/23 230   At which location is the appointment scheduled to take place? [address] [address]   Was the appointment rescheduled? Was the appointment changed from an in person visit to a virtual visit? If so, please list the details of the change. 12/12/23 Habib   What is the reason for the appointment change? provider       Was [facility] transport scheduled? N/A   Does [facility] transport need to be scheduled for the new visit (if applicable) N/A   Does the patient need an infusion appointment rescheduled? N/A   Does the patient have an upcoming infusion appointment scheduled? If so, when? No   Is the patient undergoing chemotherapy? N/A   For appointments cancelled with less than 24 hours:  Was the no-show policy reviewed?  No

## 2023-10-09 DIAGNOSIS — G43.109 MIGRAINE WITH AURA AND WITHOUT STATUS MIGRAINOSUS, NOT INTRACTABLE: Primary | ICD-10-CM

## 2023-10-11 ENCOUNTER — HOSPITAL ENCOUNTER (OUTPATIENT)
Dept: INFUSION CENTER | Facility: HOSPITAL | Age: 48
Discharge: HOME | End: 2023-10-11
Attending: PSYCHIATRY & NEUROLOGY
Payer: MEDICARE

## 2023-10-11 ENCOUNTER — HOSPITAL ENCOUNTER (OUTPATIENT)
Dept: INFUSION CENTER | Facility: HOSPITAL | Age: 48
Discharge: HOME | End: 2023-10-11
Attending: INTERNAL MEDICINE
Payer: MEDICARE

## 2023-10-11 VITALS
SYSTOLIC BLOOD PRESSURE: 127 MMHG | DIASTOLIC BLOOD PRESSURE: 75 MMHG | HEART RATE: 54 BPM | TEMPERATURE: 98.4 F | RESPIRATION RATE: 18 BRPM

## 2023-10-11 DIAGNOSIS — D50.8 IRON DEFICIENCY ANEMIA SECONDARY TO INADEQUATE DIETARY IRON INTAKE: Primary | ICD-10-CM

## 2023-10-11 DIAGNOSIS — G43.109 MIGRAINE WITH AURA AND WITHOUT STATUS MIGRAINOSUS, NOT INTRACTABLE: Primary | ICD-10-CM

## 2023-10-11 PROCEDURE — 96372 THER/PROPH/DIAG INJ SC/IM: CPT

## 2023-10-11 PROCEDURE — 96365 THER/PROPH/DIAG IV INF INIT: CPT

## 2023-10-11 RX ORDER — SODIUM CHLORIDE 9 MG/ML
20 INJECTION, SOLUTION INTRAVENOUS ONCE
OUTPATIENT
Start: 2024-01-03

## 2023-10-11 RX ORDER — SODIUM CHLORIDE 9 MG/ML
20 INJECTION, SOLUTION INTRAVENOUS ONCE
Status: COMPLETED | OUTPATIENT
Start: 2023-10-11 | End: 2023-10-11

## 2023-10-11 RX ORDER — CYANOCOBALAMIN 1000 UG/ML
1000 INJECTION, SOLUTION INTRAMUSCULAR; SUBCUTANEOUS ONCE
OUTPATIENT
Start: 2024-01-03

## 2023-10-11 RX ORDER — CYANOCOBALAMIN 1000 UG/ML
1000 INJECTION, SOLUTION INTRAMUSCULAR; SUBCUTANEOUS ONCE
Status: COMPLETED | OUTPATIENT
Start: 2023-10-11 | End: 2023-10-11

## 2023-10-11 RX ADMIN — CYANOCOBALAMIN 1000 MCG: 1000 INJECTION, SOLUTION INTRAMUSCULAR at 13:49

## 2023-10-11 RX ADMIN — SODIUM CHLORIDE 20 ML/HR: 0.9 INJECTION, SOLUTION INTRAVENOUS at 12:31

## 2023-10-11 RX ADMIN — EPTINEZUMAB-JJMR 300 MG: 100 INJECTION INTRAVENOUS at 13:03

## 2023-10-11 NOTE — PROGRESS NOTES
Pt offers no complaints. Denies any recent abx use or s/s of infection/illness. Tolerated vyepti infusion and vit b12 injection well without incident. Next infusion appointment scheduled and pt discharged in stable condition. AVS declined.

## 2023-10-13 ENCOUNTER — TELEPHONE (OUTPATIENT)
Age: 48
End: 2023-10-13

## 2023-10-13 NOTE — TELEPHONE ENCOUNTER
Caller: Shweta    Doctor: Arianna Hyatt     Reason for call:Would like to schedule her sx date     Call back#:

## 2023-10-17 ENCOUNTER — PREP FOR PROCEDURE (OUTPATIENT)
Dept: OBGYN CLINIC | Facility: CLINIC | Age: 48
End: 2023-10-17

## 2023-10-17 ENCOUNTER — OFFICE VISIT (OUTPATIENT)
Dept: PODIATRY | Facility: CLINIC | Age: 48
End: 2023-10-17
Payer: MEDICARE

## 2023-10-17 VITALS
HEART RATE: 61 BPM | DIASTOLIC BLOOD PRESSURE: 87 MMHG | HEIGHT: 63 IN | SYSTOLIC BLOOD PRESSURE: 128 MMHG | WEIGHT: 150.8 LBS | BODY MASS INDEX: 26.72 KG/M2

## 2023-10-17 DIAGNOSIS — M89.8X9 EXOSTOSIS: Primary | ICD-10-CM

## 2023-10-17 DIAGNOSIS — Z01.818 PRE-OP TESTING: ICD-10-CM

## 2023-10-17 DIAGNOSIS — M79.676 PAIN OF FIFTH TOE: ICD-10-CM

## 2023-10-17 PROCEDURE — 99214 OFFICE O/P EST MOD 30 MIN: CPT | Performed by: STUDENT IN AN ORGANIZED HEALTH CARE EDUCATION/TRAINING PROGRAM

## 2023-10-17 RX ORDER — CHLORHEXIDINE GLUCONATE ORAL RINSE 1.2 MG/ML
15 SOLUTION DENTAL ONCE
OUTPATIENT
Start: 2023-10-17 | End: 2023-10-17

## 2023-10-17 NOTE — PROGRESS NOTES
Assessment/Plan:    No problem-specific Assessment & Plan notes found for this encounter. Diagnoses and all orders for this visit:    Exostosis  -     Case request operating room: EXCISION EXOSTOSIS 5TH TOE; Standing  -     Case request operating room: EXCISION EXOSTOSIS 5TH TOE    Pain of fifth toe  -     Case request operating room: EXCISION EXOSTOSIS 5TH TOE; Standing  -     Case request operating room: EXCISION EXOSTOSIS 5TH TOE    Pre-op testing  -     Comprehensive metabolic panel; Future  -     CBC and Platelet; Future    Other orders  -     Nursing Communication Warmimg Interventions Implemented; Standing  -     Nursing Communication CHG bath, have staff wash entire body (neck down) per pre-op bathing protocol. Routine, evening prior to, and day of surgery.; Standing  -     Nursing Communication Swab both nares with Povidone-Iodine solution, EXCLUDE if patient has shellfish/Iodine allergy, and replace with nasal alcohol swabstick. Routine, day of surgery, on call to OR; Standing  -     chlorhexidine (PERIDEX) 0.12 % oral rinse 15 mL  -     Void on call to OR; Standing  -     Insert peripheral IV; Standing  -     ceFAZolin (ANCEF) 2,000 mg in dextrose 5 % 100 mL IVPB      Plan:     -  Patient was counseled and educated on the condition and the diagnosis. The diagnosis, treatment options and prognosis were discussed in detail.   -Right Foot x-rays obtained, I personally reviewed the x-ray finding with patient which is consistent with a small exostosis noted on the distal phalanx laterally.  -Plan for right fifth digit exostosis excision/removal of toenail and possible arthroplasty of the 5th toe.   -Discussed risks benefits and alternative to the surgery.   Patient reports at this time she is interested in surgical treatments as conservative has not helped.  -We discussed in length the postoperative course which will include weight-bear as tolerated surgical shoe until incision heals      -I was very clear at the beginning of the discussion about alternatives to this surgery including benign neglect, bracing, and second surgical opinions. I spent time to discuss with the patient the surgical procedure(s) as as listed above, pre-op testing, and post-op course required to properly heal the surgery. I discussed risks as infection, scar, swelling, chronic pain, poor healing of incision or bone that could require more surgery, incomplete correction of deformities or recurrence of deformities, change in shape of foot, toe, or walking and function, numbness, neuritis/RSD, blood clots in the leg or lung, and even death from anesthesia complications. No guarantees were given and the possibility of recurrent deformity or incomplete correction were discussed before patient signed the consent form. We also discussed the need for possible anticoagulation. The offloading device necessary after the surgery will be surgical shoe. The surgery, history and physical and PATS will be scheduled. Subjective:      Patient ID: Rickey Kramer is a 51 y.o. female. 65-year-old female with past medical history as below presents for continued follow-up of right fifth digit pain. Patient reports that she continues to have discomfort adjacent to her toenail with wearing shoes and socks and from pressure. She has tried all the conservative treatments she can think of such as wearing wider toebox shoes/sneakers, like on toe sleeves, offloading pads, he had trimming her toenails all the way down to the root however the pain is still consistent. Patient reports she is frustrated and is considering to explore surgical treatment options. She denies any other complaints. Denies nausea vomit fever chills shortness of breath.         The following portions of the patient's history were reviewed and updated as appropriate: She  has a past medical history of Abdominal pain, Acute cystitis with hematuria (1/3/2020), Brain condition, Chronic kidney disease, COVID-19 virus infection (11/10/2022), Dayami Ink Quervain's disease (tenosynovitis), Esophageal perforation, Gastric leak, GERD (gastroesophageal reflux disease), Headache, Idiopathic intracranial hypertension, Kidney stone, Migraine, Moderate protein-calorie malnutrition ([address]) (2/21/2018), No blood products, Papilledema, both eyes, PONV (postoperative nausea and vomiting), Postgastrectomy malabsorption, Presence of lumboperitoneal shunt, Rotator cuff tendinitis, and Visual field defect.   She   Patient Active Problem List    Diagnosis Date Noted    B12 deficiency 07/25/2023    PONV (postoperative nausea and vomiting)     Obstruction of left ureteropelvic junction (UPJ) due to stone 05/03/2023    Chronic idiopathic constipation 09/23/2022    Macromastia 04/01/2022    S/P bilateral breast reduction 04/01/2022    Annual physical exam 12/17/2021    Iron deficiency anemia secondary to inadequate dietary iron intake 11/09/2021    Migraine with aura and without status migrainosus, not intractable 04/30/2021    Encounter for surgical aftercare following surgery of digestive system 09/24/2020    Papilledema 12/10/2019    Postgastrectomy malabsorption 09/20/2019    History of idiopathic intracranial hypertension 03/08/2019    Chronic tension-type headache, intractable 03/08/2019    PTSD (post-traumatic stress disorder) 01/10/2019    Renal calculi 10/04/2018    Refusal of influenza vaccine by provider 09/28/2018    Bariatric surgery status 06/22/2018    Choroidal nevus, right eye 02/03/2018    Refusal of blood product 01/31/2018    Murmur, cardiac 01/26/2018    S/P  shunt 01/24/2018    Gastroesophageal reflux disease 12/15/2017    Hypercholesteremia 08/11/2017    Pseudotumor cerebri 05/31/2017    Mixed incontinence 04/03/2017    Subacromial bursitis of right shoulder joint 09/16/2015    Family history of malignant neoplasm of gastrointestinal tract 07/09/2013     She  has a past surgical history that includes CSF shunt; Gastric bypass (12/19/2016); Hysterectomy (03/14/2013); pr crtj shunt sxdknswxdj-glvusoenx-caaikvk terminus (Right, 05/31/2017); ESOPHAGOSCOPY WITH STENT INSERTION (N/A, 01/24/2018); Eye surgery (Bilateral, 1980); pr rplcmt/revj csf shunt valve/cath shunt sys (Right, 01/25/2018); LAPAROTOMY (N/A, 01/25/2018); pr rmvl compl csf shunt system w/o rplcmt shunt (Right, 02/05/2018); Esophagogastroduodenoscopy (N/A, 02/23/2018); Esophagogastroduodenoscopy (N/A, 02/23/2018); Esophagogastroduodenoscopy (N/A, 03/28/2018); Esophagogastroduodenoscopy (N/A, 04/05/2018); Esophagogastroduodenoscopy (N/A, 04/10/2018); pr egd transoral biopsy single/multiple (N/A, 06/27/2018); IR lumbar puncture (08/29/2018); Lumbar peritoneal shunt (11/19/2015); Wrist surgery (Right); pr crtj shunt qrrsikiryk-tbr-axx-aur (Right, 12/18/2019); pr cysto bladder w/ureteral catheterization (N/A, 06/06/2020); Hiatal hernia repair; [place] retrograde pyelogram (06/24/2020); pr cysto/uretero w/lithotripsy &indwell stent insrt (Bilateral, 06/24/2020); pr cysto/uretero w/lithotripsy &indwell stent insrt (Left, 08/21/2021); FL retrograde pyelogram (08/21/2021); Breast biopsy (Left, 1993); pr breast reduction (Bilateral, 03/25/2022); Reduction mammaplasty (Bilateral); pr cysto bladder w/ureteral catheterization (Bilateral, 5/4/2023); FL retrograde pyelogram (5/4/2023); pr cysto/uretero w/lithotripsy &indwell stent insrt (N/A, 5/18/2023); and FL retrograde pyelogram (5/18/2023).   Current Outpatient Medications   Medication Sig Dispense Refill    ascorbic acid (VITAMIN C) 250 MG CHEW Chew 250 mg daily      Biotin 1 MG CAPS Take 1 mg by mouth daily        calcium citrate-vitamin D (CITRACAL+D) 315-200 MG-UNIT per tablet Take 1 tablet by mouth 2 (two) times a day      dexlansoprazole (DEXILANT) 30 MG capsule Take 1 capsule (30 mg total) by mouth daily 90 capsule 3    famotidine (PEPCID) 40 MG tablet take 1 tablet by mouth once daily 30 tablet 2    ferrous gluconate 324 (37.5 Fe) mg Take 324 mg by mouth 2 (two) times a day before meals      folic acid (FOLVITE) 1 mg tablet Take 2 tablets (2 mg total) by mouth daily 180 tablet 3    Multiple Vitamin (MULTIVITAMIN) tablet Take 1 tablet by mouth daily Wears a patch      Multiple Vitamins-Minerals (Hair/Skin/Nails) CAPS Take 1 tablet by mouth 2 (two) times a day        omeprazole (PriLOSEC) 40 MG capsule take 1 capsule by mouth twice a day 60 capsule 2    rimegepant sulfate (Nurtec) 75 mg TBDP Take 1 tablet (75 mg total) by mouth as needed (migraine) Limit of 1 in 24 hours 16 tablet 6    vitamin B-12 (VITAMIN B-12) 500 mcg tablet Take 500 mcg by mouth daily      acetaZOLAMIDE (DIAMOX) 250 mg tablet take 1 tablet by mouth three times a day (Patient not taking: Reported on 9/2/2023) 45 tablet 0    dexamethasone (DECADRON) 2 mg tablet Take 1 tablet (2 mg total) by mouth daily with breakfast (Patient not taking: Reported on 9/2/2023) 5 tablet 0    Dihydroergotamine Mesylate HFA (Trudhesa) 0.725 MG/ACT AERS 0.725 mg into each nostril if needed (migraine) Use at onset of migraine. May repeat in 1 hour if needed. Limit of 3 a week or 8 a month. Do not use within 24 hours of a triptan. (Patient not taking: Reported on 9/2/2023) 8 mL 11    furosemide (LASIX) 20 mg tablet take 1 tablet by mouth twice a day (Patient not taking: Reported on 9/2/2023) 60 tablet 1     No current facility-administered medications for this visit. .    Review of Systems   Constitutional:  Negative for chills. Respiratory:  Negative for shortness of breath. Gastrointestinal:  Negative for vomiting. Musculoskeletal:  Positive for arthralgias. Skin:  Positive for color change. Neurological:  Negative for dizziness. Psychiatric/Behavioral:  Negative for agitation.           Objective:      /87 (BP Location: Left arm, Patient Position: Sitting, Cuff Size: Standard)   Pulse 61   Ht 5' 3" (1.6 m)   Wt 68.4 kg (150 lb 12.8 oz)   LMP (LMP Unknown)   BMI 26.71 kg/m²          Physical Exam  Vitals reviewed. Cardiovascular:      Rate and Rhythm: Normal rate. Pulses: Normal pulses. Dorsalis pedis pulses are 2+ on the right side. Posterior tibial pulses are 2+ on the right side. Pulmonary:      Effort: Pulmonary effort is normal.   Musculoskeletal:         General: Tenderness present. Right foot: Tenderness present. Comments: Tenderness to touch noted distal right fifth digit just proximal to the nail plate. Toenail does not appear to be ingrown. No apparent clinical prominence noted. Mild erythema the fifth digit. Mild adductovarus deformity to the fifth toe. Skin:     General: Skin is warm. Neurological:      General: No focal deficit present. Mental Status: She is alert.    Psychiatric:         Mood and Affect: Mood normal.

## 2023-10-18 ENCOUNTER — TELEPHONE (OUTPATIENT)
Dept: BARIATRICS | Facility: CLINIC | Age: 48
End: 2023-10-18

## 2023-10-18 DIAGNOSIS — K21.9 GERD (GASTROESOPHAGEAL REFLUX DISEASE): Primary | ICD-10-CM

## 2023-10-18 DIAGNOSIS — Z98.84 BARIATRIC SURGERY STATUS: ICD-10-CM

## 2023-10-18 NOTE — TELEPHONE ENCOUNTER
Reached out to Pt re: [place] denial by [employer]. Pt expressed frustration. She said she was given samples in GI office and it was extremely helpful. Pt reported throwing up in her sleep and waking with acid feeling in the back of her throat and SOB. Suggested Nexium as per CRNP. Pt stated she does not want to take Nexium as she saw a commercial re: Nexium and long term effects. Pt further researched and said Nexium causes low B12 and kidney disease. Pt also stated she is frustrated taking omeprazole/ famotidine as these do not work for her and she isn't sure why she is putting them in her body. Pt discussed history of post-op septic coma. Assured Pt note would be sent to CRNP requesting phone call from [address] [place] [address]. Pt verbalized understanding and was agreeable to plan.

## 2023-10-18 NOTE — TELEPHONE ENCOUNTER
Called patient in regards to dexilant denial. Insurance wants her to try different options before approving for dexilant. discussed benefits/risks of other PPIs such as nexium. Advised same class of drugs she is on however may differ in length of relief. She reports having vomiting of bile shortly after bending over from her recliner. Wants to see if Dr. Manuel Maloney has any surgical options to help with her reflux. Will discuss case with Dr. Manuel Maloney. In the meantime, will start on nexium 20 mg PO BID, discussed to try weaning off pepcid/tums to see if nexium is more effective than omeprazole. Will call office if she has any concerns.

## 2023-10-19 ENCOUNTER — TELEPHONE (OUTPATIENT)
Dept: BARIATRICS | Facility: CLINIC | Age: 48
End: 2023-10-19

## 2023-10-19 NOTE — TELEPHONE ENCOUNTER
Discuss case with Dr. Magdalena Wright. Notified patient at this time, we are unable to perform any surgical interventions for her to help her with her reflux due to complicated surgical hx. Recommended to f/u with GI for any possible endoscopic interventions with Dr. Baldev Geronimo. Will try to adjust her PPI to help with symptoms. Notified patient prior [address] is pending for nexium use. She is agreeable to plan at this time.

## 2023-11-06 ENCOUNTER — APPOINTMENT (OUTPATIENT)
Dept: LAB | Facility: HOSPITAL | Age: 48
End: 2023-11-06
Payer: MEDICARE

## 2023-11-06 DIAGNOSIS — Z98.84 BARIATRIC SURGERY STATUS: ICD-10-CM

## 2023-11-06 DIAGNOSIS — E53.8 B12 DEFICIENCY: ICD-10-CM

## 2023-11-06 DIAGNOSIS — K91.2 POSTSURGICAL MALABSORPTION: ICD-10-CM

## 2023-11-06 DIAGNOSIS — Z01.818 PRE-OP TESTING: ICD-10-CM

## 2023-11-06 DIAGNOSIS — Z48.815 ENCOUNTER FOR SURGICAL AFTERCARE FOLLOWING SURGERY OF DIGESTIVE SYSTEM: ICD-10-CM

## 2023-11-06 DIAGNOSIS — E53.8 FOLIC ACID DEFICIENCY: ICD-10-CM

## 2023-11-06 DIAGNOSIS — R53.83 OTHER FATIGUE: ICD-10-CM

## 2023-11-06 DIAGNOSIS — D50.8 IRON DEFICIENCY ANEMIA SECONDARY TO INADEQUATE DIETARY IRON INTAKE: ICD-10-CM

## 2023-11-06 LAB
ALBUMIN SERPL BCP-MCNC: 4.2 G/DL (ref 3.5–5)
ALP SERPL-CCNC: 67 U/L (ref 34–104)
ALT SERPL W P-5'-P-CCNC: 10 U/L (ref 7–52)
ANION GAP SERPL CALCULATED.3IONS-SCNC: 8 MMOL/L
AST SERPL W P-5'-P-CCNC: 12 U/L (ref 13–39)
BILIRUB SERPL-MCNC: 0.35 MG/DL (ref 0.2–1)
BUN SERPL-MCNC: 24 MG/DL (ref 5–25)
CALCIUM SERPL-MCNC: 9.3 MG/DL (ref 8.4–10.2)
CHLORIDE SERPL-SCNC: 106 MMOL/L (ref 96–108)
CO2 SERPL-SCNC: 27 MMOL/L (ref 21–32)
CREAT SERPL-MCNC: 0.65 MG/DL (ref 0.6–1.3)
CRP SERPL QL: 1 MG/L
ERYTHROCYTE [DISTWIDTH] IN BLOOD BY AUTOMATED COUNT: 12.5 % (ref 11.6–15.1)
ERYTHROCYTE [SEDIMENTATION RATE] IN BLOOD: <1 MM/HOUR (ref 0–19)
GFR SERPL CREATININE-BSD FRML MDRD: 104 ML/MIN/1.73SQ M
GLUCOSE SERPL-MCNC: 82 MG/DL (ref 65–140)
HCT VFR BLD AUTO: 51 % (ref 34.8–46.1)
HGB BLD-MCNC: 16.5 G/DL (ref 11.5–15.4)
MCH RBC QN AUTO: 29.7 PG (ref 26.8–34.3)
MCHC RBC AUTO-ENTMCNC: 32.4 G/DL (ref 31.4–37.4)
MCV RBC AUTO: 92 FL (ref 82–98)
PLATELET # BLD AUTO: 182 THOUSANDS/UL (ref 149–390)
PMV BLD AUTO: 9.9 FL (ref 8.9–12.7)
POTASSIUM SERPL-SCNC: 4.1 MMOL/L (ref 3.5–5.3)
PROT SERPL-MCNC: 6.7 G/DL (ref 6.4–8.4)
RBC # BLD AUTO: 5.55 MILLION/UL (ref 3.81–5.12)
SODIUM SERPL-SCNC: 141 MMOL/L (ref 135–147)
WBC # BLD AUTO: 7.98 THOUSAND/UL (ref 4.31–10.16)

## 2023-11-06 PROCEDURE — 36415 COLL VENOUS BLD VENIPUNCTURE: CPT

## 2023-11-06 PROCEDURE — 86140 C-REACTIVE PROTEIN: CPT

## 2023-11-06 PROCEDURE — 85027 COMPLETE CBC AUTOMATED: CPT

## 2023-11-06 PROCEDURE — 83918 ORGANIC ACIDS TOTAL QUANT: CPT

## 2023-11-06 PROCEDURE — 80053 COMPREHEN METABOLIC PANEL: CPT

## 2023-11-06 PROCEDURE — 85652 RBC SED RATE AUTOMATED: CPT

## 2023-11-07 ENCOUNTER — ANESTHESIA EVENT (OUTPATIENT)
Dept: PERIOP | Facility: HOSPITAL | Age: 48
End: 2023-11-07
Payer: MEDICARE

## 2023-11-07 ENCOUNTER — CONSULT (OUTPATIENT)
Dept: INTERNAL MEDICINE CLINIC | Age: 48
End: 2023-11-07
Payer: MEDICARE

## 2023-11-07 VITALS
WEIGHT: 148 LBS | SYSTOLIC BLOOD PRESSURE: 120 MMHG | TEMPERATURE: 97.8 F | HEART RATE: 55 BPM | BODY MASS INDEX: 26.22 KG/M2 | DIASTOLIC BLOOD PRESSURE: 78 MMHG | HEIGHT: 63 IN | OXYGEN SATURATION: 97 %

## 2023-11-07 DIAGNOSIS — D58.2 ELEVATED HEMOGLOBIN (HCC): ICD-10-CM

## 2023-11-07 DIAGNOSIS — Z01.818 PREOPERATIVE GENERAL PHYSICAL EXAMINATION: Primary | ICD-10-CM

## 2023-11-07 DIAGNOSIS — D50.8 IRON DEFICIENCY ANEMIA SECONDARY TO INADEQUATE DIETARY IRON INTAKE: ICD-10-CM

## 2023-11-07 DIAGNOSIS — M89.8X7 EXOSTOSIS OF BONE OF FOOT: ICD-10-CM

## 2023-11-07 PROCEDURE — 99243 OFF/OP CNSLTJ NEW/EST LOW 30: CPT | Performed by: INTERNAL MEDICINE

## 2023-11-07 RX ORDER — BUTALBITAL, ACETAMINOPHEN AND CAFFEINE 50; 325; 40 MG/1; MG/1; MG/1
1 TABLET ORAL EVERY 4 HOURS PRN
COMMUNITY

## 2023-11-07 NOTE — H&P (VIEW-ONLY)
Assessment/Plan:    Preoperative general physical examination  -patient is scheduled excision exostosis of the right 5th toe by Dr Feli Palacios, on 11/16/2023, using [facility] anesthesia at [address] for Exostosis and pain of the right 5th toe  - she functions at 4 Mets of physical activity daily  -Lab results have been reviewed  - her revised cardiac risk index by Vernestine Jake criteria shows very low risk with an estimated rate of myocardial infarction, pulmonary edema, ventricular fibrillation, cardiac arrest or complete heart block of 0.4%  -she may proceed with surgery with care because of her comorbidities and history of nausea and vomiting with anesthesia sometimes. - she should inform her PCP and her surgeon if her clinical status should change prior to surgery  -will fax completed pre-op notes to surgeon    Exostosis of bone of foot  -Scheduled for surgery on 11/16/2023    Iron deficiency anemia  -Resolved, and currently with elevated hemoglobin. Recent CBC done on 11/6/2023 showed a hemoglobin of 16.5, up from 15.4 on 7/19/2023 and up from 13.7 on 5/18/2023  -Patient was counseled to cut down on her iron supplements, from 3 tablets daily to 1 tablet daily  -We will order a repeat CBC to be done 1 month     Diagnoses and all orders for this visit:    Preoperative general physical examination    Exostosis of bone of foot    Iron deficiency anemia secondary to inadequate dietary iron intake  -     CBC and differential; Future    Elevated hemoglobin (HCC)  -     CBC and differential; Future          Subjective:      Patient ID: Holland Felton is a 51 y.o. female.     HPI    Patient presents for preoperative physical exam. She is scheduled for excision exostosis of the right 5th toe by Dr Feli Palacios, on 11/16/2023, using [facility] anesthesia at [address] for Exostosis and pain of the right 5th toe    Past medical history- exostosis of the right fifth toe, GERD, Chronic idiopathic constipation, migraine, pseudotumor cerebri, kidney stones, iron def anemia,     Past surgical history- three shunts - , [facility] and [facility] for ICH, gastric sleeve, breast reduction sx, strabismus sx, hysterectomy, wisdom teeth extraction, laparotomy, breast biopsy left, hiatal hernia repair, lithotripsy and stent placement    Medication- see list    Allergies- see list     History of previous adverse reaction to anesthesia-nausea and vomiting sometimes     Family history of adverse reaction to anesthesia- none known    Physical activity level-able to climb 2 flights of stairs without sob    Preop labs- done and will review    Preop EKG- not ordered    Anticoagulation use - none    Anti-platelet use - none    NSAID use- none    Alcohol use - none    Nicotine use - quit smoking in 2012, smoked for about 20 years on and off    Recreational drug use - medical marijuana lotion and vaping     Care after surgery - boyfriend    Today, patient  c/o pain in the right foot. She states that she has been feeling off for the past week on and off  Occasionally feels dizzy and lightheaded with headache when it is rainy. She also admits to rhinorrhea, occasional palpitations but denies fever, chills, night sweats, nasal congestion, sinus pain or pressure, sore throat, cough, chest pain, shortness of breath, wheezing, nausea, vomiting, abdominal pain, diarrhea, constipation, myalgias, feelings of anxiety, depression or insomnia.       The following portions of the patient's history were reviewed and updated as appropriate: She  has a past medical history of Abdominal pain, Acute cystitis with hematuria (1/3/2020), Brain condition, Chronic kidney disease, COVID-19 virus infection (11/10/2022), Aron Eagles Quervain's disease (tenosynovitis), Esophageal perforation, Gastric leak, GERD (gastroesophageal reflux disease), Headache, Idiopathic intracranial hypertension, Kidney stone, Migraine, Moderate protein-calorie malnutrition ([address]) (2/21/2018), No blood products, Papilledema, both eyes, PONV (postoperative nausea and vomiting), Postgastrectomy malabsorption, Presence of lumboperitoneal shunt, Rotator cuff tendinitis, and Visual field defect. She   Patient Active Problem List    Diagnosis Date Noted   • B12 deficiency 07/25/2023   • PONV (postoperative nausea and vomiting)    • Obstruction of left ureteropelvic junction (UPJ) due to stone 05/03/2023   • Chronic idiopathic constipation 09/23/2022   • Macromastia 04/01/2022   • S/P bilateral breast reduction 04/01/2022   • Annual physical exam 12/17/2021   • Iron deficiency anemia secondary to inadequate dietary iron intake 11/09/2021   • Migraine with aura and without status migrainosus, not intractable 04/30/2021   • Encounter for surgical aftercare following surgery of digestive system 09/24/2020   • Papilledema 12/10/2019   • Postgastrectomy malabsorption 09/20/2019   • History of idiopathic intracranial hypertension 03/08/2019   • Chronic tension-type headache, intractable 03/08/2019   • PTSD (post-traumatic stress disorder) 01/10/2019   • Renal calculi 10/04/2018   • Refusal of influenza vaccine by provider 09/28/2018   • Bariatric surgery status 06/22/2018   • Choroidal nevus, right eye 02/03/2018   • Refusal of blood product 01/31/2018   • Murmur, cardiac 01/26/2018   • S/P  shunt 01/24/2018   • Gastroesophageal reflux disease 12/15/2017   • Hypercholesteremia 08/11/2017   • Pseudotumor cerebri 05/31/2017   • Mixed incontinence 04/03/2017   • Subacromial bursitis of right shoulder joint 09/16/2015   • Family history of malignant neoplasm of gastrointestinal tract 07/09/2013     She  has a past surgical history that includes CSF shunt; Gastric bypass (12/19/2016); Hysterectomy (03/14/2013); pr crtj shunt wyluehyxpq-xpcdufgja-xscckhw terminus (Right, 05/31/2017); ESOPHAGOSCOPY WITH STENT INSERTION (N/A, 01/24/2018);  Eye surgery (Bilateral, 1980); pr rplcmt/revj csf shunt valve/cath shunt sys (Right, 01/25/2018); LAPAROTOMY (N/A, 01/25/2018); pr rmvl compl csf shunt system w/o rplcmt shunt (Right, 02/05/2018); Esophagogastroduodenoscopy (N/A, 02/23/2018); Esophagogastroduodenoscopy (N/A, 02/23/2018); Esophagogastroduodenoscopy (N/A, 03/28/2018); Esophagogastroduodenoscopy (N/A, 04/05/2018); Esophagogastroduodenoscopy (N/A, 04/10/2018); pr egd transoral biopsy single/multiple (N/A, 06/27/2018); IR lumbar puncture (08/29/2018); Lumbar peritoneal shunt (11/19/2015); Wrist surgery (Right); pr crtj shunt wtizrvjvya-bir-xdn-aur (Right, 12/18/2019); pr cysto bladder w/ureteral catheterization (N/A, 06/06/2020); Hiatal hernia repair; [place] retrograde pyelogram (06/24/2020); pr cysto/uretero w/lithotripsy &indwell stent insrt (Bilateral, 06/24/2020); pr cysto/uretero w/lithotripsy &indwell stent insrt (Left, 08/21/2021); FL retrograde pyelogram (08/21/2021); Breast biopsy (Left, 1993); pr breast reduction (Bilateral, 03/25/2022); Reduction mammaplasty (Bilateral); pr cysto bladder w/ureteral catheterization (Bilateral, 5/4/2023); FL retrograde pyelogram (5/4/2023); pr cysto/uretero w/lithotripsy &indwell stent insrt (N/A, 5/18/2023); and FL retrograde pyelogram (5/18/2023). Her family history includes Cancer in her family and paternal grandfather; Colon cancer in her family; Colon cancer (age of onset: 44) in her maternal grandfather; Kidney cancer (age of onset: 77) in her mother; Leukemia in her family; Lung cancer (age of onset: 64) in her maternal uncle; No Known Problems in her daughter, father, maternal aunt, maternal aunt, maternal grandmother, paternal aunt, paternal grandmother, and sister; Pancreatic cancer in her family; Thyroid cancer (age of onset: 36) in her brother. She  reports that she quit smoking about 11 years ago. Her smoking use included cigarettes. She started smoking about 31 years ago. She has a 10.00 pack-year smoking history.  She has never used smokeless tobacco. She reports that she does not currently use drugs after having used the following drugs: Marijuana. Frequency: 7.00 times per week. She reports that she does not drink alcohol. Current Outpatient Medications   Medication Sig Dispense Refill   • ascorbic acid (VITAMIN C) 250 MG CHEW Chew 250 mg daily     • Biotin 1 MG CAPS Take 1 mg by mouth daily       • calcium citrate-vitamin D (CITRACAL+D) 315-200 MG-UNIT per tablet Take 1 tablet by mouth 2 (two) times a day     • esomeprazole (NexIUM) 20 mg capsule Take 1 capsule (20 mg total) by mouth 2 (two) times a day before meals 60 capsule 3   • famotidine (PEPCID) 40 MG tablet take 1 tablet by mouth once daily 30 tablet 2   • ferrous gluconate 324 (37.5 Fe) mg Take 324 mg by mouth 2 (two) times a day before meals     • folic acid (FOLVITE) 1 mg tablet Take 2 tablets (2 mg total) by mouth daily 180 tablet 3   • Multiple Vitamin (MULTIVITAMIN) tablet Take 1 tablet by mouth daily Wears a patch     • Multiple Vitamins-Minerals (Hair/Skin/Nails) CAPS Take 1 tablet by mouth 2 (two) times a day       • rimegepant sulfate (Nurtec) 75 mg TBDP Take 1 tablet (75 mg total) by mouth as needed (migraine) Limit of 1 in 24 hours 16 tablet 6   • vitamin B-12 (VITAMIN B-12) 500 mcg tablet Take 500 mcg by mouth daily     • acetaZOLAMIDE (DIAMOX) 250 mg tablet take 1 tablet by mouth three times a day (Patient not taking: Reported on 9/2/2023) 45 tablet 0   • dexamethasone (DECADRON) 2 mg tablet Take 1 tablet (2 mg total) by mouth daily with breakfast (Patient not taking: Reported on 9/2/2023) 5 tablet 0   • Dihydroergotamine Mesylate HFA (Trudhesa) 0.725 MG/ACT AERS 0.725 mg into each nostril if needed (migraine) Use at onset of migraine. May repeat in 1 hour if needed. Limit of 3 a week or 8 a month. Do not use within 24 hours of a triptan. (Patient not taking: Reported on 9/2/2023) 8 mL 11     No current facility-administered medications for this visit.      Current Outpatient Medications on File Prior to Visit Medication Sig   • ascorbic acid (VITAMIN C) 250 MG CHEW Chew 250 mg daily   • Biotin 1 MG CAPS Take 1 mg by mouth daily     • calcium citrate-vitamin D (CITRACAL+D) 315-200 MG-UNIT per tablet Take 1 tablet by mouth 2 (two) times a day   • esomeprazole (NexIUM) 20 mg capsule Take 1 capsule (20 mg total) by mouth 2 (two) times a day before meals   • famotidine (PEPCID) 40 MG tablet take 1 tablet by mouth once daily   • ferrous gluconate 324 (37.5 Fe) mg Take 324 mg by mouth 2 (two) times a day before meals   • folic acid (FOLVITE) 1 mg tablet Take 2 tablets (2 mg total) by mouth daily   • Multiple Vitamin (MULTIVITAMIN) tablet Take 1 tablet by mouth daily Wears a patch   • Multiple Vitamins-Minerals (Hair/Skin/Nails) CAPS Take 1 tablet by mouth 2 (two) times a day     • rimegepant sulfate (Nurtec) 75 mg TBDP Take 1 tablet (75 mg total) by mouth as needed (migraine) Limit of 1 in 24 hours   • vitamin B-12 (VITAMIN B-12) 500 mcg tablet Take 500 mcg by mouth daily   • acetaZOLAMIDE (DIAMOX) 250 mg tablet take 1 tablet by mouth three times a day (Patient not taking: Reported on 9/2/2023)   • dexamethasone (DECADRON) 2 mg tablet Take 1 tablet (2 mg total) by mouth daily with breakfast (Patient not taking: Reported on 9/2/2023)   • Dihydroergotamine Mesylate HFA (Trudhesa) 0.725 MG/ACT AERS 0.725 mg into each nostril if needed (migraine) Use at onset of migraine. May repeat in 1 hour if needed. Limit of 3 a week or 8 a month. Do not use within 24 hours of a triptan. (Patient not taking: Reported on 9/2/2023)   • [DISCONTINUED] furosemide (LASIX) 20 mg tablet take 1 tablet by mouth twice a day (Patient not taking: Reported on 9/2/2023)     No current facility-administered medications on file prior to visit. She is allergic to benadryl [diphenhydramine] and phenergan [promethazine]. .    Review of Systems   Constitutional:  Negative for activity change, chills, fatigue, fever and unexpected weight change.    HENT: Positive for rhinorrhea (on and off). Negative for ear pain, postnasal drip, sinus pressure and sore throat. Eyes:  Negative for pain. Watery eyes    Respiratory:  Negative for cough, choking, chest tightness, shortness of breath and wheezing. Cardiovascular:  Positive for palpitations (on and off). Negative for chest pain and leg swelling. Gastrointestinal:  Negative for abdominal pain, constipation, diarrhea, nausea and vomiting. Heart burn always   Genitourinary:  Negative for dysuria and hematuria. Musculoskeletal:  Positive for arthralgias. Negative for back pain, gait problem, joint swelling, myalgias and neck stiffness. Skin:  Negative for pallor and rash. Neurological:  Positive for dizziness, light-headedness (on and off) and headaches. Negative for tremors, seizures and syncope. Hematological:  Negative for adenopathy. Psychiatric/Behavioral:  Negative for behavioral problems. Objective:      /78 (BP Location: Left arm, Patient Position: Standing, Cuff Size: Standard)   Pulse 55   Temp 97.8 °F (36.6 °C) (Temporal)   Ht 5' 3" (1.6 m)   Wt 67.1 kg (148 lb)   LMP  (LMP Unknown)   SpO2 97%   BMI 26.22 kg/m²          Physical Exam  Constitutional:       General: She is not in acute distress. Appearance: She is well-developed. She is not diaphoretic. HENT:      Head: Normocephalic and atraumatic. Right Ear: External ear normal.      Left Ear: External ear normal.      Nose: Congestion present. Mouth/Throat:      Mouth: Mucous membranes are dry. Pharynx: Posterior oropharyngeal erythema present. No oropharyngeal exudate. Eyes:      General: No scleral icterus. Right eye: No discharge. Left eye: No discharge. Conjunctiva/sclera: Conjunctivae normal.      Pupils: Pupils are equal, round, and reactive to light. Neck:      Thyroid: No thyromegaly. Vascular: No JVD. Trachea: No tracheal deviation. Cardiovascular:      Rate and Rhythm: Regular rhythm. Bradycardia present. Heart sounds: Normal heart sounds. No murmur heard. No friction rub. No gallop. Comments: Bradycardia with a heart rate of 55 bpm  Pulmonary:      Effort: Pulmonary effort is normal. No respiratory distress. Breath sounds: Normal breath sounds. No wheezing or rales. Chest:      Chest wall: No tenderness. Abdominal:      General: Bowel sounds are normal. There is no distension. Palpations: Abdomen is soft. There is no mass. Tenderness: There is no abdominal tenderness. There is no guarding or rebound. Musculoskeletal:         General: No deformity. Normal range of motion. Cervical back: Normal range of motion and neck supple. Right foot: Tenderness (tenderness of the lateral aspect of the right fifth toe) present. Lymphadenopathy:      Cervical: No cervical adenopathy. Skin:     General: Skin is warm and dry. Coloration: Skin is not pale. Findings: No erythema or rash. Neurological:      Mental Status: She is alert and oriented to person, place, and time. Cranial Nerves: No cranial nerve deficit. Motor: No abnormal muscle tone. Coordination: Coordination normal.      Deep Tendon Reflexes: Reflexes are normal and symmetric.    Psychiatric:         Behavior: Behavior normal.           Appointment on 11/06/2023   Component Date Value Ref Range Status   • CRP 11/06/2023 1.0  <3.0 mg/L Final   • Sed Rate 11/06/2023 <1  0 - 19 mm/hour Final   • Sodium 11/06/2023 141  135 - 147 mmol/L Final   • Potassium 11/06/2023 4.1  3.5 - 5.3 mmol/L Final   • Chloride 11/06/2023 106  96 - 108 mmol/L Final   • CO2 11/06/2023 27  21 - 32 mmol/L Final   • ANION GAP 11/06/2023 8  mmol/L Final   • BUN 11/06/2023 24  5 - 25 mg/dL Final   • Creatinine 11/06/2023 0.65  0.60 - 1.30 mg/dL Final    Standardized to IDMS reference method   • Glucose 11/06/2023 82  65 - 140 mg/dL Final    If the patient is fasting, the ADA then defines impaired fasting glucose as > 100 mg/dL and diabetes as > or equal to 123 mg/dL. • Calcium 11/06/2023 9.3  8.4 - 10.2 mg/dL Final   • AST 11/06/2023 12 (L)  13 - 39 U/L Final   • ALT 11/06/2023 10  7 - 52 U/L Final    Specimen collection should occur prior to Sulfasalazine administration due to the potential for falsely depressed results. • Alkaline Phosphatase 11/06/2023 67  34 - 104 U/L Final   • Total Protein 11/06/2023 6.7  6.4 - 8.4 g/dL Final   • Albumin 11/06/2023 4.2  3.5 - 5.0 g/dL Final   • Total Bilirubin 11/06/2023 0.35  0.20 - 1.00 mg/dL Final    Use of this assay is not recommended for patients undergoing treatment with eltrombopag due to the potential for falsely elevated results. N-acetyl-p-benzoquinone imine (metabolite of Acetaminophen) will generate erroneously low results in samples for patients that have taken an overdose of Acetaminophen.    • eGFR 11/06/2023 104  ml/min/1.73sq m Final   • WBC 11/06/2023 7.98  4.31 - 10.16 Thousand/uL Final   • RBC 11/06/2023 5.55 (H)  3.81 - 5.12 Million/uL Final   • Hemoglobin 11/06/2023 16.5 (H)  11.5 - 15.4 g/dL Final   • Hematocrit 11/06/2023 51.0 (H)  34.8 - 46.1 % Final   • MCV 11/06/2023 92  82 - 98 fL Final   • MCH 11/06/2023 29.7  26.8 - 34.3 pg Final   • MCHC 11/06/2023 32.4  31.4 - 37.4 g/dL Final   • RDW 11/06/2023 12.5  11.6 - 15.1 % Final   • Platelets 19/34/5358 182  149 - 390 Thousands/uL Final   • MPV 11/06/2023 9.9  8.9 - 12.7 fL Final   Office Visit on 09/02/2023   Component Date Value Ref Range Status   •  RAPID STREP A 09/02/2023 Negative  Negative Final   • POCT SARS-CoV-2 Ag 09/02/2023 Negative  Negative Final   • VALID CONTROL 09/02/2023 Valid   Final   • Throat Culture 09/02/2023 Few Colonies of Beta Hemolytic Streptococcus NOT Group A, C, or [address]   Final   Hospital Outpatient Visit on 07/19/2023   Component Date Value Ref Range Status   • WBC 07/19/2023 5.01  4.31 - 10.16 Thousand/uL Final • RBC 07/19/2023 5.09  3.81 - 5.12 Million/uL Final   • Hemoglobin 07/19/2023 15.4  11.5 - 15.4 g/dL Final   • Hematocrit 07/19/2023 45.0  34.8 - 46.1 % Final   • MCV 07/19/2023 88  82 - 98 fL Final   • MCH 07/19/2023 30.3  26.8 - 34.3 pg Final   • MCHC 07/19/2023 34.2  31.4 - 37.4 g/dL Final   • RDW 07/19/2023 13.0  11.6 - 15.1 % Final   • MPV 07/19/2023 10.7  8.9 - 12.7 fL Final   • Platelets 57/01/4130 181  149 - 390 Thousands/uL Final   • nRBC 07/19/2023 0  /100 WBCs Final   • Neutrophils Relative 07/19/2023 61  43 - 75 % Final   • Immat GRANS % 07/19/2023 0  0 - 2 % Final   • Lymphocytes Relative 07/19/2023 28  14 - 44 % Final   • Monocytes Relative 07/19/2023 6  4 - 12 % Final   • Eosinophils Relative 07/19/2023 4  0 - 6 % Final   • Basophils Relative 07/19/2023 1  0 - 1 % Final   • Neutrophils Absolute 07/19/2023 3.06  1.85 - 7.62 Thousands/µL Final   • Immature Grans Absolute 07/19/2023 0.02  0.00 - 0.20 Thousand/uL Final   • Lymphocytes Absolute 07/19/2023 1.39  0.60 - 4.47 Thousands/µL Final   • Monocytes Absolute 07/19/2023 0.28  0.17 - 1.22 Thousand/µL Final   • Eosinophils Absolute 07/19/2023 0.22  0.00 - 0.61 Thousand/µL Final   • Basophils Absolute 07/19/2023 0.04  0.00 - 0.10 Thousands/µL Final   • Sodium 07/19/2023 137  135 - 147 mmol/L Final   • Potassium 07/19/2023 4.1  3.5 - 5.3 mmol/L Final    Slightly Hemolyzed:Results may be affected. • Chloride 07/19/2023 103  96 - 108 mmol/L Final   • CO2 07/19/2023 24  21 - 32 mmol/L Final   • ANION GAP 07/19/2023 10  mmol/L Final   • BUN 07/19/2023 17  5 - 25 mg/dL Final   • Creatinine 07/19/2023 0.68  0.60 - 1.30 mg/dL Final    Standardized to IDMS reference method   • Glucose 07/19/2023 82  65 - 140 mg/dL Final    If the patient is fasting, the ADA then defines impaired fasting glucose as > 100 mg/dL and diabetes as > or equal to 123 mg/dL.    • Calcium 07/19/2023 9.2  8.4 - 10.2 mg/dL Final   • AST 07/19/2023 18  13 - 39 U/L Final    Slightly Hemolyzed:Results may be affected. • ALT 07/19/2023 7  7 - 52 U/L Final    Specimen collection should occur prior to Sulfasalazine administration due to the potential for falsely depressed results. • Alkaline Phosphatase 07/19/2023 49  34 - 104 U/L Final   • Total Protein 07/19/2023 6.3 (L)  6.4 - 8.4 g/dL Final   • Albumin 07/19/2023 4.1  3.5 - 5.0 g/dL Final   • Total Bilirubin 07/19/2023 0.41  0.20 - 1.00 mg/dL Final    Use of this assay is not recommended for patients undergoing treatment with eltrombopag due to the potential for falsely elevated results. N-acetyl-p-benzoquinone imine (metabolite of Acetaminophen) will generate erroneously low results in samples for patients that have taken an overdose of Acetaminophen. • eGFR 07/19/2023 103  ml/min/1.73sq m Final   • CRP 07/19/2023 1.2  <3.0 mg/L Final   • Sed Rate 07/19/2023 2  0 - 19 mm/hour Final   • Vitamin B-12 07/19/2023 203  180 - 914 pg/mL Final   • LD 07/19/2023 229  140 - 271 U/L Final    Slightly Hemolyzed:Results may be affected. • Folate 07/19/2023 11.3  >5.9 ng/mL Final    The World Health Organization has determined deficient folate concentrations are considered to be <4.0 ng/mL. • Erythropoietin 07/19/2023 9.1  2.6 - 18.5 mIU/mL Final    [employer] DxI 800 Immunoassay System  Values obtained with different assay methods or kits cannot be used  interchangeably. Results cannot be interpreted as absolute evidence  of the presence or absence of malignant disease. • TSH 3RD GENERATON 07/19/2023 0.888  0.450 - 4.500 uIU/mL Final    The recommended reference ranges for TSH during pregnancy are as follows:   First trimester 0.1 to 2.5 uIU/mL   Second trimester  0.2 to 3.0 uIU/mL   Third trimester 0.3 to 3.0 uIU/m    Note: Normal ranges may not apply to patients who are transgender, non-binary, or whose legal sex, sex at birth, and gender identity differ.   Adult TSH (3rd generation) reference range follows the recommended guidelines of the American Thyroid Association, January, 2020. • Vit D, 25-Hydroxy 07/19/2023 73.4  30.0 - 100.0 ng/mL Final    Vitamin D guidelines established by Clinical Guidelines Subcommittee  of the Endocrine Society Task Force, 2011    Deficiency <20ng/ml   Insufficiency 20-30ng/ml   Sufficient  ng/ml    • Iron Saturation 07/19/2023 24  15 - 50 % Final   • TIBC 07/19/2023 326  250 - 450 ug/dL Final   • Iron 07/19/2023 78  50 - 170 ug/dL Final    Patients treated with metal-binding drugs (ie. Deferoxamine) may have depressed iron values.    • Ferritin 07/19/2023 39  11 - 307 ng/mL Final   Appointment on 06/12/2023   Component Date Value Ref Range Status   • Carbon Monoxide, Blood 06/12/2023 1.9 (H)  0.0 - 1.5 % Final   Admission on 05/16/2023, Discharged on 05/18/2023   Component Date Value Ref Range Status   • WBC 05/16/2023 7.09  4.31 - 10.16 Thousand/uL Final   • RBC 05/16/2023 5.11  3.81 - 5.12 Million/uL Final   • Hemoglobin 05/16/2023 15.1  11.5 - 15.4 g/dL Final   • Hematocrit 05/16/2023 45.0  34.8 - 46.1 % Final   • MCV 05/16/2023 88  82 - 98 fL Final   • MCH 05/16/2023 29.5  26.8 - 34.3 pg Final   • MCHC 05/16/2023 33.6  31.4 - 37.4 g/dL Final   • RDW 05/16/2023 13.2  11.6 - 15.1 % Final   • MPV 05/16/2023 9.8  8.9 - 12.7 fL Final   • Platelets 36/81/0479 290  149 - 390 Thousands/uL Final   • nRBC 05/16/2023 0  /100 WBCs Final   • Neutrophils Relative 05/16/2023 70  43 - 75 % Final   • Immat GRANS % 05/16/2023 1  0 - 2 % Final   • Lymphocytes Relative 05/16/2023 18  14 - 44 % Final   • Monocytes Relative 05/16/2023 7  4 - 12 % Final   • Eosinophils Relative 05/16/2023 3  0 - 6 % Final   • Basophils Relative 05/16/2023 1  0 - 1 % Final   • Neutrophils Absolute 05/16/2023 5.03  1.85 - 7.62 Thousands/µL Final   • Immature Grans Absolute 05/16/2023 0.06  0.00 - 0.20 Thousand/uL Final   • Lymphocytes Absolute 05/16/2023 1.27  0.60 - 4.47 Thousands/µL Final   • Monocytes Absolute 05/16/2023 0.49 0.17 - 1.22 Thousand/µL Final   • Eosinophils Absolute 05/16/2023 0.19  0.00 - 0.61 Thousand/µL Final   • Basophils Absolute 05/16/2023 0.05  0.00 - 0.10 Thousands/µL Final   • Sodium 05/16/2023 136  135 - 147 mmol/L Final   • Potassium 05/16/2023 4.1  3.5 - 5.3 mmol/L Final   • Chloride 05/16/2023 110 (H)  96 - 108 mmol/L Final   • CO2 05/16/2023 27  21 - 32 mmol/L Final   • ANION GAP 05/16/2023 -1 (L)  4 - 13 mmol/L Final   • BUN 05/16/2023 19  5 - 25 mg/dL Final   • Creatinine 05/16/2023 0.67  0.60 - 1.30 mg/dL Final    Standardized to IDMS reference method   • Glucose 05/16/2023 102  65 - 140 mg/dL Final    If the patient is fasting, the ADA then defines impaired fasting glucose as > 100 mg/dL and diabetes as > or equal to 123 mg/dL. Specimen collection should occur prior to Sulfasalazine administration due to the potential for falsely depressed results. Specimen collection should occur prior to Sulfapyridine administration due to the potential for falsely elevated results. • Calcium 05/16/2023 8.9  8.3 - 10.1 mg/dL Final   • Corrected Calcium 05/16/2023 9.5  8.3 - 10.1 mg/dL Final   • AST 05/16/2023 8  5 - 45 U/L Final    Specimen collection should occur prior to Sulfasalazine administration due to the potential for falsely depressed results. • ALT 05/16/2023 15  12 - 78 U/L Final    Specimen collection should occur prior to Sulfasalazine and/or Sulfapyridine administration due to the potential for falsely depressed results. • Alkaline Phosphatase 05/16/2023 53  46 - 116 U/L Final   • Total Protein 05/16/2023 6.4  6.4 - 8.4 g/dL Final   • Albumin 05/16/2023 3.2 (L)  3.5 - 5.0 g/dL Final   • Total Bilirubin 05/16/2023 0.43  0.20 - 1.00 mg/dL Final    Use of this assay is not recommended for patients undergoing treatment with eltrombopag due to the potential for falsely elevated results.    • eGFR 05/16/2023 104  ml/min/1.73sq m Final   • Color, UA 05/16/2023 Yellow   Final   • Clarity, UA 05/16/2023 Hazy Final   • Specific Gravity, UA 05/16/2023 1.018  1.003 - 1.030 Final   • pH, UA 05/16/2023 7.5  4.5, 5.0, 5.5, 6.0, 6.5, 7.0, 7.5, 8.0 Final   • Leukocytes, UA 05/16/2023 Small (A)  Negative Final   • Nitrite, UA 05/16/2023 Negative  Negative Final   • Protein, UA 05/16/2023 50 (1+) (A)  Negative mg/dl Final   • Glucose, UA 05/16/2023 Negative  Negative mg/dl Final   • Ketones, UA 05/16/2023 Negative  Negative mg/dl Final   • Urobilinogen, UA 05/16/2023 <2.0  <2.0 mg/dl mg/dl Final   • Bilirubin, UA 05/16/2023 Negative  Negative Final   • Occult Blood, UA 05/16/2023 Large (A)  Negative Final   • RBC, UA 05/16/2023 Innumerable (A)  None Seen, 1-2 /hpf Final   • WBC, UA 05/16/2023 30-50 (A)  None Seen, 1-2 /hpf Final   • Epithelial Cells 05/16/2023 Moderate (A)  None Seen, Occasional /hpf Final   • Bacteria, UA 05/16/2023 None Seen  None Seen, Occasional /hpf Final   • MUCUS THREADS 05/16/2023 Innumerable (A)  None Seen Final   • Hyaline Casts, UA 05/16/2023 5-10 (A)  None Seen /lpf Final   • Urine Culture 05/16/2023 50,000-59,000 cfu/ml   Final    Mixed Contaminants X4   • Sodium 05/17/2023 135  135 - 147 mmol/L Final   • Potassium 05/17/2023 3.8  3.5 - 5.3 mmol/L Final   • Chloride 05/17/2023 108  96 - 108 mmol/L Final   • CO2 05/17/2023 27  21 - 32 mmol/L Final   • ANION GAP 05/17/2023 0 (L)  4 - 13 mmol/L Final   • BUN 05/17/2023 17  5 - 25 mg/dL Final   • Creatinine 05/17/2023 0.62  0.60 - 1.30 mg/dL Final    Standardized to IDMS reference method   • Glucose 05/17/2023 91  65 - 140 mg/dL Final    Specimen collection should occur prior to Sulfasalazine administration due to the potential for falsely depressed results. Specimen collection should occur prior to Sulfapyridine administration due to the potential for falsely elevated results. If the patient is fasting, the ADA then defines impaired fasting glucose as > 100 mg/dL and diabetes as > or equal to 123 mg/dL.    • Calcium 05/17/2023 8.4  8.3 - 10.1 mg/dL Final   • eGFR 05/17/2023 106  ml/min/1.73sq m Final   • WBC 05/17/2023 5.24  4.31 - 10.16 Thousand/uL Final   • RBC 05/17/2023 4.65  3.81 - 5.12 Million/uL Final   • Hemoglobin 05/17/2023 13.8  11.5 - 15.4 g/dL Final   • Hematocrit 05/17/2023 41.2  34.8 - 46.1 % Final   • MCV 05/17/2023 89  82 - 98 fL Final   • MCH 05/17/2023 29.7  26.8 - 34.3 pg Final   • MCHC 05/17/2023 33.5  31.4 - 37.4 g/dL Final   • RDW 05/17/2023 13.2  11.6 - 15.1 % Final   • MPV 05/17/2023 9.4  8.9 - 12.7 fL Final   • Platelets 41/88/7923 230  149 - 390 Thousands/uL Final   • nRBC 05/17/2023 0  /100 WBCs Final   • Neutrophils Relative 05/17/2023 58  43 - 75 % Final   • Immat GRANS % 05/17/2023 1  0 - 2 % Final   • Lymphocytes Relative 05/17/2023 28  14 - 44 % Final   • Monocytes Relative 05/17/2023 8  4 - 12 % Final   • Eosinophils Relative 05/17/2023 4  0 - 6 % Final   • Basophils Relative 05/17/2023 1  0 - 1 % Final   • Neutrophils Absolute 05/17/2023 3.05  1.85 - 7.62 Thousands/µL Final   • Immature Grans Absolute 05/17/2023 0.03  0.00 - 0.20 Thousand/uL Final   • Lymphocytes Absolute 05/17/2023 1.48  0.60 - 4.47 Thousands/µL Final   • Monocytes Absolute 05/17/2023 0.41  0.17 - 1.22 Thousand/µL Final   • Eosinophils Absolute 05/17/2023 0.22  0.00 - 0.61 Thousand/µL Final   • Basophils Absolute 05/17/2023 0.05  0.00 - 0.10 Thousands/µL Final   • HCG, Quant 05/17/2023 <2  <=6 mIU/mL Final   • WBC 05/18/2023 5.31  4.31 - 10.16 Thousand/uL Final   • RBC 05/18/2023 4.73  3.81 - 5.12 Million/uL Final   • Hemoglobin 05/18/2023 13.7  11.5 - 15.4 g/dL Final   • Hematocrit 05/18/2023 42.4  34.8 - 46.1 % Final   • MCV 05/18/2023 90  82 - 98 fL Final   • MCH 05/18/2023 29.0  26.8 - 34.3 pg Final   • MCHC 05/18/2023 32.3  31.4 - 37.4 g/dL Final   • RDW 05/18/2023 13.0  11.6 - 15.1 % Final   • MPV 05/18/2023 9.6  8.9 - 12.7 fL Final   • Platelets 49/04/5378 237  149 - 390 Thousands/uL Final   • nRBC 05/18/2023 0  /100 WBCs Final   • Neutrophils Relative 05/18/2023 58  43 - 75 % Final   • Immat GRANS % 05/18/2023 1  0 - 2 % Final   • Lymphocytes Relative 05/18/2023 26  14 - 44 % Final   • Monocytes Relative 05/18/2023 8  4 - 12 % Final   • Eosinophils Relative 05/18/2023 6  0 - 6 % Final   • Basophils Relative 05/18/2023 1  0 - 1 % Final   • Neutrophils Absolute 05/18/2023 3.15  1.85 - 7.62 Thousands/µL Final   • Immature Grans Absolute 05/18/2023 0.04  0.00 - 0.20 Thousand/uL Final   • Lymphocytes Absolute 05/18/2023 1.36  0.60 - 4.47 Thousands/µL Final   • Monocytes Absolute 05/18/2023 0.41  0.17 - 1.22 Thousand/µL Final   • Eosinophils Absolute 05/18/2023 0.30  0.00 - 0.61 Thousand/µL Final   • Basophils Absolute 05/18/2023 0.05  0.00 - 0.10 Thousands/µL Final   • Sodium 05/18/2023 134 (L)  135 - 147 mmol/L Final   • Potassium 05/18/2023 3.9  3.5 - 5.3 mmol/L Final   • Chloride 05/18/2023 106  96 - 108 mmol/L Final   • CO2 05/18/2023 25  21 - 32 mmol/L Final   • ANION GAP 05/18/2023 3 (L)  4 - 13 mmol/L Final   • BUN 05/18/2023 12  5 - 25 mg/dL Final   • Creatinine 05/18/2023 0.49 (L)  0.60 - 1.30 mg/dL Final    Standardized to IDMS reference method   • Glucose 05/18/2023 83  65 - 140 mg/dL Final    Specimen collection should occur prior to Sulfasalazine administration due to the potential for falsely depressed results. Specimen collection should occur prior to Sulfapyridine administration due to the potential for falsely elevated results. If the patient is fasting, the ADA then defines impaired fasting glucose as > 100 mg/dL and diabetes as > or equal to 123 mg/dL. • Calcium 05/18/2023 8.9  8.3 - 10.1 mg/dL Final   • eGFR 05/18/2023 115  ml/min/1.73sq m Final   • COLOR 05/18/2023 Yuan Chau   Final   • Size 05/18/2023 3x3  mm Final    Single piece received.    • Stone Weight 05/18/2023 9  mg Final   • Composition 05/18/2023 Comment   Final    Percentage (Represents the % composition)   • Ca Oxalate,Dihydrate 05/18/2023 20  % Final   • Ca Oxalate,Monohydr. 05/18/2023 75  % Final   • Comment 05/18/2023 Comment   Final    Calcium phosphate (hydroxyl form) includes hydroxyapatite,  amorphous calcium phosphate, and whitlockite. Hydroxyapatite  is the most common of the calcium phosphate salts found in  human kidney stones. • STONE COMMENT 05/18/2023 Comment   Final    Physician questions regarding [facility]cul[facility] Analysis contact  [facility] at: [phone]. • Please note 05/18/2023 Comment   Final    Calculi report will follow via computer, mail or   delivery. • Disclaimer 05/18/2023 Comment   Final    This test was developed and its performance characteristics  determined by [facility].  It has not been cleared or approved  by the Food and Drug Administration.    • Photo 05/18/2023 Comment   Final    Photograph will follow under a separate cover   • Stone Source 05/18/2023 Comment   Final    Left Kidney   • Hydroxyapatite 05/18/2023 5  % Final   Office Visit on 05/11/2023   Component Date Value Ref Range Status   • Urine Culture 05/11/2023 No Growth <1000 cfu/mL   Final   Admission on 05/03/2023, Discharged on 05/07/2023   Component Date Value Ref Range Status   • WBC 05/03/2023 6.70  4.31 - 10.16 Thousand/uL Final   • RBC 05/03/2023 5.56 (H)  3.81 - 5.12 Million/uL Final   • Hemoglobin 05/03/2023 16.9 (H)  11.5 - 15.4 g/dL Final   • Hematocrit 05/03/2023 49.9 (H)  34.8 - 46.1 % Final   • MCV 05/03/2023 90  82 - 98 fL Final   • MCH 05/03/2023 30.4  26.8 - 34.3 pg Final   • MCHC 05/03/2023 33.9  31.4 - 37.4 g/dL Final   • RDW 05/03/2023 13.1  11.6 - 15.1 % Final   • MPV 05/03/2023 9.4  8.9 - 12.7 fL Final   • Platelets 61/71/1048 208  149 - 390 Thousands/uL Final   • nRBC 05/03/2023 0  /100 WBCs Final   • Neutrophils Relative 05/03/2023 62  43 - 75 % Final   • Immat GRANS % 05/03/2023 0  0 - 2 % Final   • Lymphocytes Relative 05/03/2023 26  14 - 44 % Final   • Monocytes Relative 05/03/2023 6  4 - 12 % Final   • Eosinophils Relative 05/03/2023 5  0 - 6 % Final   • Basophils Relative 05/03/2023 1  0 - 1 % Final   • Neutrophils Absolute 05/03/2023 4.15  1.85 - 7.62 Thousands/µL Final   • Immature Grans Absolute 05/03/2023 0.02  0.00 - 0.20 Thousand/uL Final   • Lymphocytes Absolute 05/03/2023 1.74  0.60 - 4.47 Thousands/µL Final   • Monocytes Absolute 05/03/2023 0.40  0.17 - 1.22 Thousand/µL Final   • Eosinophils Absolute 05/03/2023 0.35  0.00 - 0.61 Thousand/µL Final   • Basophils Absolute 05/03/2023 0.04  0.00 - 0.10 Thousands/µL Final   • Sodium 05/03/2023 137  135 - 147 mmol/L Final   • Potassium 05/03/2023 4.6  3.5 - 5.3 mmol/L Final   • Chloride 05/03/2023 103  96 - 108 mmol/L Final   • CO2 05/03/2023 26  21 - 32 mmol/L Final   • ANION GAP 05/03/2023 8  4 - 13 mmol/L Final   • BUN 05/03/2023 14  5 - 25 mg/dL Final   • Creatinine 05/03/2023 0.72  0.60 - 1.30 mg/dL Final    Standardized to IDMS reference method   • Glucose 05/03/2023 110  65 - 140 mg/dL Final    If the patient is fasting, the ADA then defines impaired fasting glucose as > 100 mg/dL and diabetes as > or equal to 123 mg/dL.    • Calcium 05/03/2023 9.5  8.4 - 10.2 mg/dL Final   • eGFR 05/03/2023 99  ml/min/1.73sq m Final   • Magnesium 05/03/2023 2.1  1.9 - 2.7 mg/dL Final   • Preg, Serum 05/03/2023 Negative  Negative Final   • Color, UA 05/03/2023 Yellow  Yellow, Straw Final   • Clarity, UA 05/03/2023 Slightly Cloudy (A)  Hazy, Clear Final   • Specific Gravity, UA 05/03/2023 1.020  >1.005 - <1.030 Final   • pH, UA 05/03/2023 7.0  5.0, 5.5, 6.0, 6.5, 7.0, 7.5 Final   • Leukocytes, UA 05/03/2023 1+ (A)  Negative Final   • Nitrite, UA 05/03/2023 Negative  Negative Final   • Protein, UA 05/03/2023 Negative  Negative, Interference- unable to analyze mg/dl Final   • Glucose, UA 05/03/2023 Negative  Negative mg/dl Final   • Ketones, UA 05/03/2023 40 (2+) (A)  Negative mg/dl Final   • Urobilinogen, UA 05/03/2023 0.2  0.2, 1.0 E.U./dl E.U./dl Final   • Bilirubin, UA 05/03/2023 Negative  Negative Final   • Occult Blood, UA 05/03/2023 2+ (A)  Negative Final   • RBC, UA 05/03/2023 1-2  None Seen, 0-1, 1-2, 2-4, 0-5 /hpf Final   • WBC, UA 05/03/2023 20-30 (A)  None Seen, 0-1, 1-2, 0-5, 2-4 /hpf Final   • Epithelial Cells 05/03/2023 Occasional  None Seen, Occasional /hpf Final   • Bacteria, UA 05/03/2023 Innumerable (A)  None Seen, Occasional /hpf Final   • OTHER OBSERVATIONS 05/03/2023 WBCs Clumped   Final    SEE OTHER OBSERVATIONS RESULTS!!!   • Urine Culture 05/03/2023 >100,000 cfu/ml Escherichia coli (A)   Final    This organism has been edited. The previous result was Gram Negative [facility] Like on 5/5/2023 at 0826 EDT. • Sodium 05/04/2023 138  135 - 147 mmol/L Final   • Potassium 05/04/2023 3.9  3.5 - 5.3 mmol/L Final   • Chloride 05/04/2023 106  96 - 108 mmol/L Final   • CO2 05/04/2023 25  21 - 32 mmol/L Final   • ANION GAP 05/04/2023 7  4 - 13 mmol/L Final   • BUN 05/04/2023 15  5 - 25 mg/dL Final   • Creatinine 05/04/2023 0.78  0.60 - 1.30 mg/dL Final    Standardized to IDMS reference method   • Glucose 05/04/2023 97  65 - 140 mg/dL Final    If the patient is fasting, the ADA then defines impaired fasting glucose as > 100 mg/dL and diabetes as > or equal to 123 mg/dL. • Glucose, Fasting 05/04/2023 97  65 - 99 mg/dL Final   • Calcium 05/04/2023 8.8  8.4 - 10.2 mg/dL Final   • eGFR 05/04/2023 90  ml/min/1.73sq m Final   • Sodium 05/04/2023 138  135 - 147 mmol/L Final   • Potassium 05/04/2023 3.3 (L)  3.5 - 5.3 mmol/L Final   • Chloride 05/04/2023 106  96 - 108 mmol/L Final   • CO2 05/04/2023 24  21 - 32 mmol/L Final   • ANION GAP 05/04/2023 8  4 - 13 mmol/L Final   • BUN 05/04/2023 15  5 - 25 mg/dL Final   • Creatinine 05/04/2023 0.97  0.60 - 1.30 mg/dL Final    Standardized to IDMS reference method   • Glucose 05/04/2023 143 (H)  65 - 140 mg/dL Final    If the patient is fasting, the ADA then defines impaired fasting glucose as > 100 mg/dL and diabetes as > or equal to 123 mg/dL.    • Glucose, Fasting 05/04/2023 143 (H) 65 - 99 mg/dL Final   • Calcium 05/04/2023 8.3 (L)  8.4 - 10.2 mg/dL Final   • eGFR 05/04/2023 69  ml/min/1.73sq m Final   • WBC 05/04/2023 5.28  4.31 - 10.16 Thousand/uL Final   • RBC 05/04/2023 4.90  3.81 - 5.12 Million/uL Final    This is an appended report. These results have been appended to a previously preliminary verified report. • Hemoglobin 05/04/2023 14.7  11.5 - 15.4 g/dL Final   • Hematocrit 05/04/2023 44.7  34.8 - 46.1 % Final   • MCV 05/04/2023 91  82 - 98 fL Final   • MCH 05/04/2023 30.0  26.8 - 34.3 pg Final   • MCHC 05/04/2023 32.9  31.4 - 37.4 g/dL Final   • RDW 05/04/2023 13.1  11.6 - 15.1 % Final   • Platelets 27/77/1291 120 (L)  149 - 390 Thousands/uL Final    Comment: Manual Review of Smear Performed                             This is an appended report. These results have been appended to a previously preliminary verified report.    • MPV 05/04/2023 9.6  8.9 - 12.7 fL Final   • Urine Culture 05/04/2023 70,000-79,000 cfu/ml Escherichia coli (A)   Final   • Color, UA 05/04/2023 Ct (A)  Yellow, Straw Final   • Clarity, UA 05/04/2023 Cloudy (A)  Hazy, Clear Final   • Specific Gravity, UA 05/04/2023 >=1.030 (H)  >1.005 - <1.030 Final   • pH, UA 05/04/2023 6.0  5.0, 5.5, 6.0, 6.5, 7.0, 7.5 Final   • Leukocytes, UA 05/04/2023 1+ (A)  Negative Final   • Nitrite, UA 05/04/2023 Positive (A)  Negative Final   • Protein, UA 05/04/2023 1+ (A)  Negative, Interference- unable to analyze mg/dl Final   • Glucose, UA 05/04/2023 Negative  Negative mg/dl Final   • Ketones, UA 05/04/2023 Trace (A)  Negative mg/dl Final   • Urobilinogen, UA 05/04/2023 0.2  0.2, 1.0 E.U./dl E.U./dl Final   • Bilirubin, UA 05/04/2023 Negative  Negative Final   • Occult Blood, UA 05/04/2023 3+ (A)  Negative Final   • Urine Culture 05/04/2023 <10,000 cfu/ml Escherichia coli (A)   Final   • Color, UA 05/04/2023 Ct (A)  Yellow, Straw Final   • Clarity, UA 05/04/2023 Cloudy (A)  Hazy, Clear Final   • Specific Corry, UA 05/04/2023 1.020  >1.005 - <1.030 Final   • pH, UA 05/04/2023 5.5  5.0, 5.5, 6.0, 6.5, 7.0, 7.5 Final   • Leukocytes, UA 05/04/2023 2+ (A)  Negative Final   • Nitrite, UA 05/04/2023 Negative  Negative Final   • Protein, UA 05/04/2023 2+ (A)  Negative, Interference- unable to analyze mg/dl Final   • Glucose, UA 05/04/2023 Trace (A)  Negative mg/dl Final   • Ketones, UA 05/04/2023 Trace (A)  Negative mg/dl Final   • Urobilinogen, UA 05/04/2023 0.2  0.2, 1.0 E.U./dl E.U./dl Final   • Bilirubin, UA 05/04/2023 2+ (A)  Negative Final   • Occult Blood, UA 05/04/2023 3+ (A)  Negative Final   • Urine Culture 05/04/2023 50,000-59,000 cfu/ml Escherichia coli (A)   Final   • Color, UA 05/04/2023 Ct (A)  Yellow, Straw Final   • Clarity, UA 05/04/2023 Slightly Cloudy (A)  Hazy, Clear Final   • Specific Gravity, UA 05/04/2023 1.010  >1.005 - <1.030 Final   • pH, UA 05/04/2023 5.5  5.0, 5.5, 6.0, 6.5, 7.0, 7.5 Final   • Leukocytes, UA 05/04/2023 Negative  Negative Final   • Nitrite, UA 05/04/2023 Negative  Negative Final   • Protein, UA 05/04/2023 1+ (A)  Negative, Interference- unable to analyze mg/dl Final   • Glucose, UA 05/04/2023 Negative  Negative mg/dl Final   • Ketones, UA 05/04/2023 Trace (A)  Negative mg/dl Final   • Urobilinogen, UA 05/04/2023 0.2  0.2, 1.0 E.U./dl E.U./dl Final   • Bilirubin, UA 05/04/2023 Negative  Negative Final   • Occult Blood, UA 05/04/2023 3+ (A)  Negative Final   • RBC, UA 05/04/2023 10-20 (A)  None Seen, 0-1, 1-2, 2-4, 0-5 /hpf Final   • WBC, UA 05/04/2023 30-50 (A)  None Seen, 0-1, 1-2, 0-5, 2-4 /hpf Final   • Epithelial Cells 05/04/2023 Occasional  None Seen, Occasional /hpf Final   • Bacteria, UA 05/04/2023 Moderate (A)  None Seen, Occasional /hpf Final   • MUCUS THREADS 05/04/2023 Moderate (A)  None Seen Final   • RBC, UA 05/04/2023 10-20 (A)  None Seen, 0-1, 1-2, 2-4, 0-5 /hpf Final   • WBC, UA 05/04/2023 0-1  None Seen, 0-1, 1-2, 0-5, 2-4 /hpf Final   • Epithelial Cells 05/04/2023 Occasional  None Seen, Occasional /hpf Final   • Bacteria, UA 05/04/2023 None Seen  None Seen, Occasional /hpf Final   • MUCUS THREADS 05/04/2023 Occasional (A)  None Seen Final   • RBC, UA 05/04/2023 10-20 (A)  None Seen, 0-1, 1-2, 2-4, 0-5 /hpf Final   • WBC, UA 05/04/2023 20-30 (A)  None Seen, 0-1, 1-2, 0-5, 2-4 /hpf Final   • Epithelial Cells 05/04/2023 Occasional  None Seen, Occasional /hpf Final   • Bacteria, UA 05/04/2023 Occasional  None Seen, Occasional /hpf Final   • MUCUS THREADS 05/04/2023 Occasional (A)  None Seen Final   • Sodium 05/05/2023 138  135 - 147 mmol/L Final   • Potassium 05/05/2023 4.0  3.5 - 5.3 mmol/L Final   • Chloride 05/05/2023 106  96 - 108 mmol/L Final   • CO2 05/05/2023 23  21 - 32 mmol/L Final   • ANION GAP 05/05/2023 9  4 - 13 mmol/L Final   • BUN 05/05/2023 24  5 - 25 mg/dL Final   • Creatinine 05/05/2023 1.06  0.60 - 1.30 mg/dL Final    Standardized to IDMS reference method   • Glucose 05/05/2023 79  65 - 140 mg/dL Final    If the patient is fasting, the ADA then defines impaired fasting glucose as > 100 mg/dL and diabetes as > or equal to 123 mg/dL.    • Calcium 05/05/2023 8.4  8.4 - 10.2 mg/dL Final   • eGFR 05/05/2023 62  ml/min/1.73sq m Final   • WBC 05/07/2023 5.37  4.31 - 10.16 Thousand/uL Final   • RBC 05/07/2023 4.29  3.81 - 5.12 Million/uL Final   • Hemoglobin 05/07/2023 12.8  11.5 - 15.4 g/dL Final   • Hematocrit 05/07/2023 39.3  34.8 - 46.1 % Final   • MCV 05/07/2023 92  82 - 98 fL Final   • MCH 05/07/2023 29.8  26.8 - 34.3 pg Final   • MCHC 05/07/2023 32.6  31.4 - 37.4 g/dL Final   • RDW 05/07/2023 13.3  11.6 - 15.1 % Final   • MPV 05/07/2023 11.2  8.9 - 12.7 fL Final   • Platelets 22/14/8446 102 (L)  149 - 390 Thousands/uL Final   • nRBC 05/07/2023 0  /100 WBCs Final   • Neutrophils Relative 05/07/2023 74  43 - 75 % Final   • Immat GRANS % 05/07/2023 1  0 - 2 % Final   • Lymphocytes Relative 05/07/2023 13 (L)  14 - 44 % Final   • Monocytes Relative 05/07/2023 8  4 - 12 % Final   • Eosinophils Relative 05/07/2023 3  0 - 6 % Final   • Basophils Relative 05/07/2023 1  0 - 1 % Final   • Neutrophils Absolute 05/07/2023 4.03  1.85 - 7.62 Thousands/µL Final   • Immature Grans Absolute 05/07/2023 0.04  0.00 - 0.20 Thousand/uL Final   • Lymphocytes Absolute 05/07/2023 0.68  0.60 - 4.47 Thousands/µL Final   • Monocytes Absolute 05/07/2023 0.41  0.17 - 1.22 Thousand/µL Final   • Eosinophils Absolute 05/07/2023 0.18  0.00 - 0.61 Thousand/µL Final   • Basophils Absolute 05/07/2023 0.03  0.00 - 0.10 Thousands/µL Final   • Sodium 05/07/2023 141  135 - 147 mmol/L Final   • Potassium 05/07/2023 3.9  3.5 - 5.3 mmol/L Final   • Chloride 05/07/2023 106  96 - 108 mmol/L Final   • CO2 05/07/2023 30  21 - 32 mmol/L Final   • ANION GAP 05/07/2023 5  4 - 13 mmol/L Final   • BUN 05/07/2023 21  5 - 25 mg/dL Final   • Creatinine 05/07/2023 0.54 (L)  0.60 - 1.30 mg/dL Final    Standardized to IDMS reference method   • Glucose 05/07/2023 115  65 - 140 mg/dL Final    If the patient is fasting, the ADA then defines impaired fasting glucose as > 100 mg/dL and diabetes as > or equal to 123 mg/dL. • Calcium 05/07/2023 8.5  8.4 - 10.2 mg/dL Final   • eGFR 05/07/2023 111  ml/min/1.73sq m Final   Appointment on 12/07/2022   Component Date Value Ref Range Status   • Zinc 12/07/2022 60  44 - 115 ug/dL Final                                    Detection Limit = 5   • Vitamin B1, Whole Blood 12/07/2022 137.5  66.5 - 200.0 nmol/L Final   • Vitamin A 12/07/2022 34.7  20.1 - 62.0 ug/dL Final    Reference intervals for vitamin A determined from [facility] internal  studies. Individuals with vitamin A less than 20 ug/dL are considered  vitamin A deficient and those with serum concentrations less than  10 ug/dL are considered severely deficient. This test was developed and its performance characteristics  determined by [facility]. It has not been cleared or approved  by the Food and Drug Administration.    • PTH 12/07/2022 77.3 18.4 - 80.1 pg/mL Final   Hospital Outpatient Visit on 11/09/2022   Component Date Value Ref Range Status   • WBC 11/09/2022 3.53 (L)  4.31 - 10.16 Thousand/uL Final   • RBC 11/09/2022 5.04  3.81 - 5.12 Million/uL Final   • Hemoglobin 11/09/2022 15.5 (H)  11.5 - 15.4 g/dL Final   • Hematocrit 11/09/2022 46.7 (H)  34.8 - 46.1 % Final   • MCV 11/09/2022 93  82 - 98 fL Final   • MCH 11/09/2022 30.8  26.8 - 34.3 pg Final   • MCHC 11/09/2022 33.2  31.4 - 37.4 g/dL Final   • RDW 11/09/2022 13.2  11.6 - 15.1 % Final   • MPV 11/09/2022 9.5  8.9 - 12.7 fL Final   • Platelets 26/52/5540 143 (L)  149 - 390 Thousands/uL Final   • nRBC 11/09/2022 0  /100 WBCs Final   • Neutrophils Relative 11/09/2022 59  43 - 75 % Final   • Immat GRANS % 11/09/2022 1  0 - 2 % Final   • Lymphocytes Relative 11/09/2022 24  14 - 44 % Final   • Monocytes Relative 11/09/2022 11  4 - 12 % Final   • Eosinophils Relative 11/09/2022 4  0 - 6 % Final   • Basophils Relative 11/09/2022 1  0 - 1 % Final   • Neutrophils Absolute 11/09/2022 2.09  1.85 - 7.62 Thousands/µL Final   • Immature Grans Absolute 11/09/2022 0.04  0.00 - 0.20 Thousand/uL Final   • Lymphocytes Absolute 11/09/2022 0.86  0.60 - 4.47 Thousands/µL Final   • Monocytes Absolute 11/09/2022 0.38  0.17 - 1.22 Thousand/µL Final   • Eosinophils Absolute 11/09/2022 0.13  0.00 - 0.61 Thousand/µL Final   • Basophils Absolute 11/09/2022 0.03  0.00 - 0.10 Thousands/µL Final   • Sodium 11/09/2022 139  135 - 147 mmol/L Final   • Potassium 11/09/2022 3.6  3.5 - 5.3 mmol/L Final   • Chloride 11/09/2022 104  96 - 108 mmol/L Final   • CO2 11/09/2022 29  21 - 32 mmol/L Final   • ANION GAP 11/09/2022 6  4 - 13 mmol/L Final   • BUN 11/09/2022 19  5 - 25 mg/dL Final   • Creatinine 11/09/2022 0.58 (L)  0.60 - 1.30 mg/dL Final    Standardized to IDMS reference method   • Glucose 11/09/2022 88  65 - 140 mg/dL Final    If the patient is fasting, the ADA then defines impaired fasting glucose as > 100 mg/dL and diabetes as > or equal to 123 mg/dL. Specimen collection should occur prior to Sulfasalazine administration due to the potential for falsely depressed results. Specimen collection should occur prior to Sulfapyridine administration due to the potential for falsely elevated results. • Calcium 11/09/2022 8.9  8.4 - 10.2 mg/dL Final   • AST 11/09/2022 20  13 - 39 U/L Final    Specimen collection should occur prior to Sulfasalazine administration due to the potential for falsely depressed results. • ALT 11/09/2022 20  7 - 52 U/L Final    Specimen collection should occur prior to Sulfasalazine administration due to the potential for falsely depressed results. • Alkaline Phosphatase 11/09/2022 53  34 - 104 U/L Final   • Total Protein 11/09/2022 6.5  6.4 - 8.4 g/dL Final   • Albumin 11/09/2022 4.0  3.5 - 5.0 g/dL Final   • Total Bilirubin 11/09/2022 0.43  0.20 - 1.00 mg/dL Final   • eGFR 11/09/2022 109  ml/min/1.73sq m Final   • CRP 11/09/2022 1.1  <3.0 mg/L Final   • Sed Rate 11/09/2022 2  0 - 19 mm/hour Final   • Vitamin B-12 11/09/2022 300  100 - 900 pg/mL Final   • Folate 11/09/2022 8.1  3.1 - 17.5 ng/mL Final    Slightly Hemolyzed; Results May be Affected   • Iron Saturation 11/09/2022 30  15 - 50 % Final   • TIBC 11/09/2022 270  250 - 450 ug/dL Final   • Iron 11/09/2022 80  50 - 170 ug/dL Final    Patients treated with metal-binding drugs (ie. Deferoxamine) may have depressed iron values.    • Ferritin 11/09/2022 123  8 - 388 ng/mL Final

## 2023-11-07 NOTE — PROGRESS NOTES
Assessment/Plan:    Preoperative general physical examination  -patient is scheduled excision exostosis of the right 5th toe by Dr Lakhwinder Adams, on 11/16/2023, using [facility] anesthesia at [address] for Exostosis and pain of the right 5th toe  - she functions at 4 Mets of physical activity daily  -Lab results have been reviewed  - her revised cardiac risk index by Achilles Graft criteria shows very low risk with an estimated rate of myocardial infarction, pulmonary edema, ventricular fibrillation, cardiac arrest or complete heart block of 0.4%  -she may proceed with surgery with care because of her comorbidities and history of nausea and vomiting with anesthesia sometimes. - she should inform her PCP and her surgeon if her clinical status should change prior to surgery  -will fax completed pre-op notes to surgeon    Exostosis of bone of foot  -Scheduled for surgery on 11/16/2023    Iron deficiency anemia  -Resolved, and currently with elevated hemoglobin. Recent CBC done on 11/6/2023 showed a hemoglobin of 16.5, up from 15.4 on 7/19/2023 and up from 13.7 on 5/18/2023  -Patient was counseled to cut down on her iron supplements, from 3 tablets daily to 1 tablet daily  -We will order a repeat CBC to be done 1 month     Diagnoses and all orders for this visit:    Preoperative general physical examination    Exostosis of bone of foot    Iron deficiency anemia secondary to inadequate dietary iron intake  -     CBC and differential; Future    Elevated hemoglobin (HCC)  -     CBC and differential; Future          Subjective:      Patient ID: Lucie Street is a 51 y.o. female.     HPI    Patient presents for preoperative physical exam. She is scheduled for excision exostosis of the right 5th toe by Dr Lakhwinder Adams, on 11/16/2023, using choice anesthesia at [address] for Exostosis and pain of the right 5th toe    Past medical history- exostosis of the right fifth toe, GERD, Chronic idiopathic constipation, migraine, pseudotumor cerebri, kidney stones, iron def anemia,     Past surgical history- three shunts - , [facility] and [facility] for ICH, gastric sleeve, breast reduction sx, strabismus sx, hysterectomy, wisdom teeth extraction, laparotomy, breast biopsy left, hiatal hernia repair, lithotripsy and stent placement    Medication- see list    Allergies- see list     History of previous adverse reaction to anesthesia-nausea and vomiting sometimes     Family history of adverse reaction to anesthesia- none known    Physical activity level-able to climb 2 flights of stairs without sob    Preop labs- done and will review    Preop EKG- not ordered    Anticoagulation use - none    Anti-platelet use - none    NSAID use- none    Alcohol use - none    Nicotine use - quit smoking in 2012, smoked for about 20 years on and off    Recreational drug use - medical marijuana lotion and vaping     Care after surgery - boyfriend    Today, patient  c/o pain in the right foot. She states that she has been feeling off for the past week on and off  Occasionally feels dizzy and lightheaded with headache when it is rainy. She also admits to rhinorrhea, occasional palpitations but denies fever, chills, night sweats, nasal congestion, sinus pain or pressure, sore throat, cough, chest pain, shortness of breath, wheezing, nausea, vomiting, abdominal pain, diarrhea, constipation, myalgias, feelings of anxiety, depression or insomnia.       The following portions of the patient's history were reviewed and updated as appropriate: She  has a past medical history of Abdominal pain, Acute cystitis with hematuria (1/3/2020), Brain condition, Chronic kidney disease, COVID-19 virus infection (11/10/2022), Sheri Boyd Quervain's disease (tenosynovitis), Esophageal perforation, Gastric leak, GERD (gastroesophageal reflux disease), Headache, Idiopathic intracranial hypertension, Kidney stone, Migraine, Moderate protein-calorie malnutrition ([address]) (2/21/2018), No blood products, Papilledema, both eyes, PONV (postoperative nausea and vomiting), Postgastrectomy malabsorption, Presence of lumboperitoneal shunt, Rotator cuff tendinitis, and Visual field defect. She   Patient Active Problem List    Diagnosis Date Noted   • B12 deficiency 07/25/2023   • PONV (postoperative nausea and vomiting)    • Obstruction of left ureteropelvic junction (UPJ) due to stone 05/03/2023   • Chronic idiopathic constipation 09/23/2022   • Macromastia 04/01/2022   • S/P bilateral breast reduction 04/01/2022   • Annual physical exam 12/17/2021   • Iron deficiency anemia secondary to inadequate dietary iron intake 11/09/2021   • Migraine with aura and without status migrainosus, not intractable 04/30/2021   • Encounter for surgical aftercare following surgery of digestive system 09/24/2020   • Papilledema 12/10/2019   • Postgastrectomy malabsorption 09/20/2019   • History of idiopathic intracranial hypertension 03/08/2019   • Chronic tension-type headache, intractable 03/08/2019   • PTSD (post-traumatic stress disorder) 01/10/2019   • Renal calculi 10/04/2018   • Refusal of influenza vaccine by provider 09/28/2018   • Bariatric surgery status 06/22/2018   • Choroidal nevus, right eye 02/03/2018   • Refusal of blood product 01/31/2018   • Murmur, cardiac 01/26/2018   • S/P  shunt 01/24/2018   • Gastroesophageal reflux disease 12/15/2017   • Hypercholesteremia 08/11/2017   • Pseudotumor cerebri 05/31/2017   • Mixed incontinence 04/03/2017   • Subacromial bursitis of right shoulder joint 09/16/2015   • Family history of malignant neoplasm of gastrointestinal tract 07/09/2013     She  has a past surgical history that includes CSF shunt; Gastric bypass (12/19/2016); Hysterectomy (03/14/2013); pr crtj shunt xmdskobgvy-hceumhjto-lwbaabs terminus (Right, 05/31/2017); ESOPHAGOSCOPY WITH STENT INSERTION (N/A, 01/24/2018);  Eye surgery (Bilateral, 1980); pr rplcmt/revj csf shunt valve/cath shunt sys (Right, 01/25/2018); LAPAROTOMY (N/A, 01/25/2018); pr rmvl compl csf shunt system w/o rplcmt shunt (Right, 02/05/2018); Esophagogastroduodenoscopy (N/A, 02/23/2018); Esophagogastroduodenoscopy (N/A, 02/23/2018); Esophagogastroduodenoscopy (N/A, 03/28/2018); Esophagogastroduodenoscopy (N/A, 04/05/2018); Esophagogastroduodenoscopy (N/A, 04/10/2018); pr egd transoral biopsy single/multiple (N/A, 06/27/2018); IR lumbar puncture (08/29/2018); Lumbar peritoneal shunt (11/19/2015); Wrist surgery (Right); pr crtj shunt ljngkxsnuv-xxc-yry-aur (Right, 12/18/2019); pr cysto bladder w/ureteral catheterization (N/A, 06/06/2020); Hiatal hernia repair; [place] retrograde pyelogram (06/24/2020); pr cysto/uretero w/lithotripsy &indwell stent insrt (Bilateral, 06/24/2020); pr cysto/uretero w/lithotripsy &indwell stent insrt (Left, 08/21/2021); FL retrograde pyelogram (08/21/2021); Breast biopsy (Left, 1993); pr breast reduction (Bilateral, 03/25/2022); Reduction mammaplasty (Bilateral); pr cysto bladder w/ureteral catheterization (Bilateral, 5/4/2023); FL retrograde pyelogram (5/4/2023); pr cysto/uretero w/lithotripsy &indwell stent insrt (N/A, 5/18/2023); and FL retrograde pyelogram (5/18/2023). Her family history includes Cancer in her family and paternal grandfather; Colon cancer in her family; Colon cancer (age of onset: 44) in her maternal grandfather; Kidney cancer (age of onset: 77) in her mother; Leukemia in her family; Lung cancer (age of onset: 64) in her maternal uncle; No Known Problems in her daughter, father, maternal aunt, maternal aunt, maternal grandmother, paternal aunt, paternal grandmother, and sister; Pancreatic cancer in her family; Thyroid cancer (age of onset: 36) in her brother. She  reports that she quit smoking about 11 years ago. Her smoking use included cigarettes. She started smoking about 31 years ago. She has a 10.00 pack-year smoking history.  She has never used smokeless tobacco. She reports that she does not currently use drugs after having used the following drugs: Marijuana. Frequency: 7.00 times per week. She reports that she does not drink alcohol. Current Outpatient Medications   Medication Sig Dispense Refill   • ascorbic acid (VITAMIN C) 250 MG CHEW Chew 250 mg daily     • Biotin 1 MG CAPS Take 1 mg by mouth daily       • calcium citrate-vitamin D (CITRACAL+D) 315-200 MG-UNIT per tablet Take 1 tablet by mouth 2 (two) times a day     • esomeprazole (NexIUM) 20 mg capsule Take 1 capsule (20 mg total) by mouth 2 (two) times a day before meals 60 capsule 3   • famotidine (PEPCID) 40 MG tablet take 1 tablet by mouth once daily 30 tablet 2   • ferrous gluconate 324 (37.5 Fe) mg Take 324 mg by mouth 2 (two) times a day before meals     • folic acid (FOLVITE) 1 mg tablet Take 2 tablets (2 mg total) by mouth daily 180 tablet 3   • Multiple Vitamin (MULTIVITAMIN) tablet Take 1 tablet by mouth daily Wears a patch     • Multiple Vitamins-Minerals (Hair/Skin/Nails) CAPS Take 1 tablet by mouth 2 (two) times a day       • rimegepant sulfate (Nurtec) 75 mg TBDP Take 1 tablet (75 mg total) by mouth as needed (migraine) Limit of 1 in 24 hours 16 tablet 6   • vitamin B-12 (VITAMIN B-12) 500 mcg tablet Take 500 mcg by mouth daily     • acetaZOLAMIDE (DIAMOX) 250 mg tablet take 1 tablet by mouth three times a day (Patient not taking: Reported on 9/2/2023) 45 tablet 0   • dexamethasone (DECADRON) 2 mg tablet Take 1 tablet (2 mg total) by mouth daily with breakfast (Patient not taking: Reported on 9/2/2023) 5 tablet 0   • Dihydroergotamine Mesylate HFA (Trudhesa) 0.725 MG/ACT AERS 0.725 mg into each nostril if needed (migraine) Use at onset of migraine. May repeat in 1 hour if needed. Limit of 3 a week or 8 a month. Do not use within 24 hours of a triptan. (Patient not taking: Reported on 9/2/2023) 8 mL 11     No current facility-administered medications for this visit.      Current Outpatient Medications on File Prior to Visit Medication Sig   • ascorbic acid (VITAMIN C) 250 MG CHEW Chew 250 mg daily   • Biotin 1 MG CAPS Take 1 mg by mouth daily     • calcium citrate-vitamin D (CITRACAL+D) 315-200 MG-UNIT per tablet Take 1 tablet by mouth 2 (two) times a day   • esomeprazole (NexIUM) 20 mg capsule Take 1 capsule (20 mg total) by mouth 2 (two) times a day before meals   • famotidine (PEPCID) 40 MG tablet take 1 tablet by mouth once daily   • ferrous gluconate 324 (37.5 Fe) mg Take 324 mg by mouth 2 (two) times a day before meals   • folic acid (FOLVITE) 1 mg tablet Take 2 tablets (2 mg total) by mouth daily   • Multiple Vitamin (MULTIVITAMIN) tablet Take 1 tablet by mouth daily Wears a patch   • Multiple Vitamins-Minerals (Hair/Skin/Nails) CAPS Take 1 tablet by mouth 2 (two) times a day     • rimegepant sulfate (Nurtec) 75 mg TBDP Take 1 tablet (75 mg total) by mouth as needed (migraine) Limit of 1 in 24 hours   • vitamin B-12 (VITAMIN B-12) 500 mcg tablet Take 500 mcg by mouth daily   • acetaZOLAMIDE (DIAMOX) 250 mg tablet take 1 tablet by mouth three times a day (Patient not taking: Reported on 9/2/2023)   • dexamethasone (DECADRON) 2 mg tablet Take 1 tablet (2 mg total) by mouth daily with breakfast (Patient not taking: Reported on 9/2/2023)   • Dihydroergotamine Mesylate HFA (Trudhesa) 0.725 MG/ACT AERS 0.725 mg into each nostril if needed (migraine) Use at onset of migraine. May repeat in 1 hour if needed. Limit of 3 a week or 8 a month. Do not use within 24 hours of a triptan. (Patient not taking: Reported on 9/2/2023)   • [DISCONTINUED] furosemide (LASIX) 20 mg tablet take 1 tablet by mouth twice a day (Patient not taking: Reported on 9/2/2023)     No current facility-administered medications on file prior to visit. She is allergic to benadryl [diphenhydramine] and phenergan [promethazine]. .    Review of Systems   Constitutional:  Negative for activity change, chills, fatigue, fever and unexpected weight change.    HENT: Positive for rhinorrhea (on and off). Negative for ear pain, postnasal drip, sinus pressure and sore throat. Eyes:  Negative for pain. Watery eyes    Respiratory:  Negative for cough, choking, chest tightness, shortness of breath and wheezing. Cardiovascular:  Positive for palpitations (on and off). Negative for chest pain and leg swelling. Gastrointestinal:  Negative for abdominal pain, constipation, diarrhea, nausea and vomiting. Heart burn always   Genitourinary:  Negative for dysuria and hematuria. Musculoskeletal:  Positive for arthralgias. Negative for back pain, gait problem, joint swelling, myalgias and neck stiffness. Skin:  Negative for pallor and rash. Neurological:  Positive for dizziness, light-headedness (on and off) and headaches. Negative for tremors, seizures and syncope. Hematological:  Negative for adenopathy. Psychiatric/Behavioral:  Negative for behavioral problems. Objective:      /78 (BP Location: Left arm, Patient Position: Standing, Cuff Size: Standard)   Pulse 55   Temp 97.8 °F (36.6 °C) (Temporal)   Ht 5' 3" (1.6 m)   Wt 67.1 kg (148 lb)   LMP  (LMP Unknown)   SpO2 97%   BMI 26.22 kg/m²          Physical Exam  Constitutional:       General: She is not in acute distress. Appearance: She is well-developed. She is not diaphoretic. HENT:      Head: Normocephalic and atraumatic. Right Ear: External ear normal.      Left Ear: External ear normal.      Nose: Congestion present. Mouth/Throat:      Mouth: Mucous membranes are dry. Pharynx: Posterior oropharyngeal erythema present. No oropharyngeal exudate. Eyes:      General: No scleral icterus. Right eye: No discharge. Left eye: No discharge. Conjunctiva/sclera: Conjunctivae normal.      Pupils: Pupils are equal, round, and reactive to light. Neck:      Thyroid: No thyromegaly. Vascular: No JVD. Trachea: No tracheal deviation. Cardiovascular:      Rate and Rhythm: Regular rhythm. Bradycardia present. Heart sounds: Normal heart sounds. No murmur heard. No friction rub. No gallop. Comments: Bradycardia with a heart rate of 55 bpm  Pulmonary:      Effort: Pulmonary effort is normal. No respiratory distress. Breath sounds: Normal breath sounds. No wheezing or rales. Chest:      Chest wall: No tenderness. Abdominal:      General: Bowel sounds are normal. There is no distension. Palpations: Abdomen is soft. There is no mass. Tenderness: There is no abdominal tenderness. There is no guarding or rebound. Musculoskeletal:         General: No deformity. Normal range of motion. Cervical back: Normal range of motion and neck supple. Right foot: Tenderness (tenderness of the lateral aspect of the right fifth toe) present. Lymphadenopathy:      Cervical: No cervical adenopathy. Skin:     General: Skin is warm and dry. Coloration: Skin is not pale. Findings: No erythema or rash. Neurological:      Mental Status: She is alert and oriented to person, place, and time. Cranial Nerves: No cranial nerve deficit. Motor: No abnormal muscle tone. Coordination: Coordination normal.      Deep Tendon Reflexes: Reflexes are normal and symmetric.    Psychiatric:         Behavior: Behavior normal.           Appointment on 11/06/2023   Component Date Value Ref Range Status   • CRP 11/06/2023 1.0  <3.0 mg/L Final   • Sed Rate 11/06/2023 <1  0 - 19 mm/hour Final   • Sodium 11/06/2023 141  135 - 147 mmol/L Final   • Potassium 11/06/2023 4.1  3.5 - 5.3 mmol/L Final   • Chloride 11/06/2023 106  96 - 108 mmol/L Final   • CO2 11/06/2023 27  21 - 32 mmol/L Final   • ANION GAP 11/06/2023 8  mmol/L Final   • BUN 11/06/2023 24  5 - 25 mg/dL Final   • Creatinine 11/06/2023 0.65  0.60 - 1.30 mg/dL Final    Standardized to IDMS reference method   • Glucose 11/06/2023 82  65 - 140 mg/dL Final    If the patient is fasting, the ADA then defines impaired fasting glucose as > 100 mg/dL and diabetes as > or equal to 123 mg/dL. • Calcium 11/06/2023 9.3  8.4 - 10.2 mg/dL Final   • AST 11/06/2023 12 (L)  13 - 39 U/L Final   • ALT 11/06/2023 10  7 - 52 U/L Final    Specimen collection should occur prior to Sulfasalazine administration due to the potential for falsely depressed results. • Alkaline Phosphatase 11/06/2023 67  34 - 104 U/L Final   • Total Protein 11/06/2023 6.7  6.4 - 8.4 g/dL Final   • Albumin 11/06/2023 4.2  3.5 - 5.0 g/dL Final   • Total Bilirubin 11/06/2023 0.35  0.20 - 1.00 mg/dL Final    Use of this assay is not recommended for patients undergoing treatment with eltrombopag due to the potential for falsely elevated results. N-acetyl-p-benzoquinone imine (metabolite of Acetaminophen) will generate erroneously low results in samples for patients that have taken an overdose of Acetaminophen.    • eGFR 11/06/2023 104  ml/min/1.73sq m Final   • WBC 11/06/2023 7.98  4.31 - 10.16 Thousand/uL Final   • RBC 11/06/2023 5.55 (H)  3.81 - 5.12 Million/uL Final   • Hemoglobin 11/06/2023 16.5 (H)  11.5 - 15.4 g/dL Final   • Hematocrit 11/06/2023 51.0 (H)  34.8 - 46.1 % Final   • MCV 11/06/2023 92  82 - 98 fL Final   • MCH 11/06/2023 29.7  26.8 - 34.3 pg Final   • MCHC 11/06/2023 32.4  31.4 - 37.4 g/dL Final   • RDW 11/06/2023 12.5  11.6 - 15.1 % Final   • Platelets 91/15/8793 182  149 - 390 Thousands/uL Final   • MPV 11/06/2023 9.9  8.9 - 12.7 fL Final   Office Visit on 09/02/2023   Component Date Value Ref Range Status   •  RAPID STREP A 09/02/2023 Negative  Negative Final   • POCT SARS-CoV-2 Ag 09/02/2023 Negative  Negative Final   • VALID CONTROL 09/02/2023 Valid   Final   • Throat Culture 09/02/2023 Few Colonies of Beta Hemolytic Streptococcus NOT Group A, C, or [address]   Final   Hospital Outpatient Visit on 07/19/2023   Component Date Value Ref Range Status   • WBC 07/19/2023 5.01  4.31 - 10.16 Thousand/uL Final • RBC 07/19/2023 5.09  3.81 - 5.12 Million/uL Final   • Hemoglobin 07/19/2023 15.4  11.5 - 15.4 g/dL Final   • Hematocrit 07/19/2023 45.0  34.8 - 46.1 % Final   • MCV 07/19/2023 88  82 - 98 fL Final   • MCH 07/19/2023 30.3  26.8 - 34.3 pg Final   • MCHC 07/19/2023 34.2  31.4 - 37.4 g/dL Final   • RDW 07/19/2023 13.0  11.6 - 15.1 % Final   • MPV 07/19/2023 10.7  8.9 - 12.7 fL Final   • Platelets 14/91/8203 181  149 - 390 Thousands/uL Final   • nRBC 07/19/2023 0  /100 WBCs Final   • Neutrophils Relative 07/19/2023 61  43 - 75 % Final   • Immat GRANS % 07/19/2023 0  0 - 2 % Final   • Lymphocytes Relative 07/19/2023 28  14 - 44 % Final   • Monocytes Relative 07/19/2023 6  4 - 12 % Final   • Eosinophils Relative 07/19/2023 4  0 - 6 % Final   • Basophils Relative 07/19/2023 1  0 - 1 % Final   • Neutrophils Absolute 07/19/2023 3.06  1.85 - 7.62 Thousands/µL Final   • Immature Grans Absolute 07/19/2023 0.02  0.00 - 0.20 Thousand/uL Final   • Lymphocytes Absolute 07/19/2023 1.39  0.60 - 4.47 Thousands/µL Final   • Monocytes Absolute 07/19/2023 0.28  0.17 - 1.22 Thousand/µL Final   • Eosinophils Absolute 07/19/2023 0.22  0.00 - 0.61 Thousand/µL Final   • Basophils Absolute 07/19/2023 0.04  0.00 - 0.10 Thousands/µL Final   • Sodium 07/19/2023 137  135 - 147 mmol/L Final   • Potassium 07/19/2023 4.1  3.5 - 5.3 mmol/L Final    Slightly Hemolyzed:Results may be affected. • Chloride 07/19/2023 103  96 - 108 mmol/L Final   • CO2 07/19/2023 24  21 - 32 mmol/L Final   • ANION GAP 07/19/2023 10  mmol/L Final   • BUN 07/19/2023 17  5 - 25 mg/dL Final   • Creatinine 07/19/2023 0.68  0.60 - 1.30 mg/dL Final    Standardized to IDMS reference method   • Glucose 07/19/2023 82  65 - 140 mg/dL Final    If the patient is fasting, the ADA then defines impaired fasting glucose as > 100 mg/dL and diabetes as > or equal to 123 mg/dL.    • Calcium 07/19/2023 9.2  8.4 - 10.2 mg/dL Final   • AST 07/19/2023 18  13 - 39 U/L Final    Slightly Hemolyzed:Results may be affected. • ALT 07/19/2023 7  7 - 52 U/L Final    Specimen collection should occur prior to Sulfasalazine administration due to the potential for falsely depressed results. • Alkaline Phosphatase 07/19/2023 49  34 - 104 U/L Final   • Total Protein 07/19/2023 6.3 (L)  6.4 - 8.4 g/dL Final   • Albumin 07/19/2023 4.1  3.5 - 5.0 g/dL Final   • Total Bilirubin 07/19/2023 0.41  0.20 - 1.00 mg/dL Final    Use of this assay is not recommended for patients undergoing treatment with eltrombopag due to the potential for falsely elevated results. N-acetyl-p-benzoquinone imine (metabolite of Acetaminophen) will generate erroneously low results in samples for patients that have taken an overdose of Acetaminophen. • eGFR 07/19/2023 103  ml/min/1.73sq m Final   • CRP 07/19/2023 1.2  <3.0 mg/L Final   • Sed Rate 07/19/2023 2  0 - 19 mm/hour Final   • Vitamin B-12 07/19/2023 203  180 - 914 pg/mL Final   • LD 07/19/2023 229  140 - 271 U/L Final    Slightly Hemolyzed:Results may be affected. • Folate 07/19/2023 11.3  >5.9 ng/mL Final    The World Health Organization has determined deficient folate concentrations are considered to be <4.0 ng/mL. • Erythropoietin 07/19/2023 9.1  2.6 - 18.5 mIU/mL Final    [employer] DxI 800 Immunoassay System  Values obtained with different assay methods or kits cannot be used  interchangeably. Results cannot be interpreted as absolute evidence  of the presence or absence of malignant disease. • TSH 3RD GENERATON 07/19/2023 0.888  0.450 - 4.500 uIU/mL Final    The recommended reference ranges for TSH during pregnancy are as follows:   First trimester 0.1 to 2.5 uIU/mL   Second trimester  0.2 to 3.0 uIU/mL   Third trimester 0.3 to 3.0 uIU/m    Note: Normal ranges may not apply to patients who are transgender, non-binary, or whose legal sex, sex at birth, and gender identity differ.   Adult TSH (3rd generation) reference range follows the recommended guidelines of the American Thyroid Association, January, 2020. • Vit D, 25-Hydroxy 07/19/2023 73.4  30.0 - 100.0 ng/mL Final    Vitamin D guidelines established by Clinical Guidelines Subcommittee  of the Endocrine Society Task Force, 2011    Deficiency <20ng/ml   Insufficiency 20-30ng/ml   Sufficient  ng/ml    • Iron Saturation 07/19/2023 24  15 - 50 % Final   • TIBC 07/19/2023 326  250 - 450 ug/dL Final   • Iron 07/19/2023 78  50 - 170 ug/dL Final    Patients treated with metal-binding drugs (ie. Deferoxamine) may have depressed iron values.    • Ferritin 07/19/2023 39  11 - 307 ng/mL Final   Appointment on 06/12/2023   Component Date Value Ref Range Status   • Carbon Monoxide, Blood 06/12/2023 1.9 (H)  0.0 - 1.5 % Final   Admission on 05/16/2023, Discharged on 05/18/2023   Component Date Value Ref Range Status   • WBC 05/16/2023 7.09  4.31 - 10.16 Thousand/uL Final   • RBC 05/16/2023 5.11  3.81 - 5.12 Million/uL Final   • Hemoglobin 05/16/2023 15.1  11.5 - 15.4 g/dL Final   • Hematocrit 05/16/2023 45.0  34.8 - 46.1 % Final   • MCV 05/16/2023 88  82 - 98 fL Final   • MCH 05/16/2023 29.5  26.8 - 34.3 pg Final   • MCHC 05/16/2023 33.6  31.4 - 37.4 g/dL Final   • RDW 05/16/2023 13.2  11.6 - 15.1 % Final   • MPV 05/16/2023 9.8  8.9 - 12.7 fL Final   • Platelets 06/41/9866 290  149 - 390 Thousands/uL Final   • nRBC 05/16/2023 0  /100 WBCs Final   • Neutrophils Relative 05/16/2023 70  43 - 75 % Final   • Immat GRANS % 05/16/2023 1  0 - 2 % Final   • Lymphocytes Relative 05/16/2023 18  14 - 44 % Final   • Monocytes Relative 05/16/2023 7  4 - 12 % Final   • Eosinophils Relative 05/16/2023 3  0 - 6 % Final   • Basophils Relative 05/16/2023 1  0 - 1 % Final   • Neutrophils Absolute 05/16/2023 5.03  1.85 - 7.62 Thousands/µL Final   • Immature Grans Absolute 05/16/2023 0.06  0.00 - 0.20 Thousand/uL Final   • Lymphocytes Absolute 05/16/2023 1.27  0.60 - 4.47 Thousands/µL Final   • Monocytes Absolute 05/16/2023 0.49 0.17 - 1.22 Thousand/µL Final   • Eosinophils Absolute 05/16/2023 0.19  0.00 - 0.61 Thousand/µL Final   • Basophils Absolute 05/16/2023 0.05  0.00 - 0.10 Thousands/µL Final   • Sodium 05/16/2023 136  135 - 147 mmol/L Final   • Potassium 05/16/2023 4.1  3.5 - 5.3 mmol/L Final   • Chloride 05/16/2023 110 (H)  96 - 108 mmol/L Final   • CO2 05/16/2023 27  21 - 32 mmol/L Final   • ANION GAP 05/16/2023 -1 (L)  4 - 13 mmol/L Final   • BUN 05/16/2023 19  5 - 25 mg/dL Final   • Creatinine 05/16/2023 0.67  0.60 - 1.30 mg/dL Final    Standardized to IDMS reference method   • Glucose 05/16/2023 102  65 - 140 mg/dL Final    If the patient is fasting, the ADA then defines impaired fasting glucose as > 100 mg/dL and diabetes as > or equal to 123 mg/dL. Specimen collection should occur prior to Sulfasalazine administration due to the potential for falsely depressed results. Specimen collection should occur prior to Sulfapyridine administration due to the potential for falsely elevated results. • Calcium 05/16/2023 8.9  8.3 - 10.1 mg/dL Final   • Corrected Calcium 05/16/2023 9.5  8.3 - 10.1 mg/dL Final   • AST 05/16/2023 8  5 - 45 U/L Final    Specimen collection should occur prior to Sulfasalazine administration due to the potential for falsely depressed results. • ALT 05/16/2023 15  12 - 78 U/L Final    Specimen collection should occur prior to Sulfasalazine and/or Sulfapyridine administration due to the potential for falsely depressed results. • Alkaline Phosphatase 05/16/2023 53  46 - 116 U/L Final   • Total Protein 05/16/2023 6.4  6.4 - 8.4 g/dL Final   • Albumin 05/16/2023 3.2 (L)  3.5 - 5.0 g/dL Final   • Total Bilirubin 05/16/2023 0.43  0.20 - 1.00 mg/dL Final    Use of this assay is not recommended for patients undergoing treatment with eltrombopag due to the potential for falsely elevated results.    • eGFR 05/16/2023 104  ml/min/1.73sq m Final   • Color, UA 05/16/2023 Yellow   Final   • Clarity, UA 05/16/2023 Hazy Final   • Specific Gravity, UA 05/16/2023 1.018  1.003 - 1.030 Final   • pH, UA 05/16/2023 7.5  4.5, 5.0, 5.5, 6.0, 6.5, 7.0, 7.5, 8.0 Final   • Leukocytes, UA 05/16/2023 Small (A)  Negative Final   • Nitrite, UA 05/16/2023 Negative  Negative Final   • Protein, UA 05/16/2023 50 (1+) (A)  Negative mg/dl Final   • Glucose, UA 05/16/2023 Negative  Negative mg/dl Final   • Ketones, UA 05/16/2023 Negative  Negative mg/dl Final   • Urobilinogen, UA 05/16/2023 <2.0  <2.0 mg/dl mg/dl Final   • Bilirubin, UA 05/16/2023 Negative  Negative Final   • Occult Blood, UA 05/16/2023 Large (A)  Negative Final   • RBC, UA 05/16/2023 Innumerable (A)  None Seen, 1-2 /hpf Final   • WBC, UA 05/16/2023 30-50 (A)  None Seen, 1-2 /hpf Final   • Epithelial Cells 05/16/2023 Moderate (A)  None Seen, Occasional /hpf Final   • Bacteria, UA 05/16/2023 None Seen  None Seen, Occasional /hpf Final   • MUCUS THREADS 05/16/2023 Innumerable (A)  None Seen Final   • Hyaline Casts, UA 05/16/2023 5-10 (A)  None Seen /lpf Final   • Urine Culture 05/16/2023 50,000-59,000 cfu/ml   Final    Mixed Contaminants X4   • Sodium 05/17/2023 135  135 - 147 mmol/L Final   • Potassium 05/17/2023 3.8  3.5 - 5.3 mmol/L Final   • Chloride 05/17/2023 108  96 - 108 mmol/L Final   • CO2 05/17/2023 27  21 - 32 mmol/L Final   • ANION GAP 05/17/2023 0 (L)  4 - 13 mmol/L Final   • BUN 05/17/2023 17  5 - 25 mg/dL Final   • Creatinine 05/17/2023 0.62  0.60 - 1.30 mg/dL Final    Standardized to IDMS reference method   • Glucose 05/17/2023 91  65 - 140 mg/dL Final    Specimen collection should occur prior to Sulfasalazine administration due to the potential for falsely depressed results. Specimen collection should occur prior to Sulfapyridine administration due to the potential for falsely elevated results. If the patient is fasting, the ADA then defines impaired fasting glucose as > 100 mg/dL and diabetes as > or equal to 123 mg/dL.    • Calcium 05/17/2023 8.4  8.3 - 10.1 mg/dL Final   • eGFR 05/17/2023 106  ml/min/1.73sq m Final   • WBC 05/17/2023 5.24  4.31 - 10.16 Thousand/uL Final   • RBC 05/17/2023 4.65  3.81 - 5.12 Million/uL Final   • Hemoglobin 05/17/2023 13.8  11.5 - 15.4 g/dL Final   • Hematocrit 05/17/2023 41.2  34.8 - 46.1 % Final   • MCV 05/17/2023 89  82 - 98 fL Final   • MCH 05/17/2023 29.7  26.8 - 34.3 pg Final   • MCHC 05/17/2023 33.5  31.4 - 37.4 g/dL Final   • RDW 05/17/2023 13.2  11.6 - 15.1 % Final   • MPV 05/17/2023 9.4  8.9 - 12.7 fL Final   • Platelets 24/21/5104 230  149 - 390 Thousands/uL Final   • nRBC 05/17/2023 0  /100 WBCs Final   • Neutrophils Relative 05/17/2023 58  43 - 75 % Final   • Immat GRANS % 05/17/2023 1  0 - 2 % Final   • Lymphocytes Relative 05/17/2023 28  14 - 44 % Final   • Monocytes Relative 05/17/2023 8  4 - 12 % Final   • Eosinophils Relative 05/17/2023 4  0 - 6 % Final   • Basophils Relative 05/17/2023 1  0 - 1 % Final   • Neutrophils Absolute 05/17/2023 3.05  1.85 - 7.62 Thousands/µL Final   • Immature Grans Absolute 05/17/2023 0.03  0.00 - 0.20 Thousand/uL Final   • Lymphocytes Absolute 05/17/2023 1.48  0.60 - 4.47 Thousands/µL Final   • Monocytes Absolute 05/17/2023 0.41  0.17 - 1.22 Thousand/µL Final   • Eosinophils Absolute 05/17/2023 0.22  0.00 - 0.61 Thousand/µL Final   • Basophils Absolute 05/17/2023 0.05  0.00 - 0.10 Thousands/µL Final   • HCG, Quant 05/17/2023 <2  <=6 mIU/mL Final   • WBC 05/18/2023 5.31  4.31 - 10.16 Thousand/uL Final   • RBC 05/18/2023 4.73  3.81 - 5.12 Million/uL Final   • Hemoglobin 05/18/2023 13.7  11.5 - 15.4 g/dL Final   • Hematocrit 05/18/2023 42.4  34.8 - 46.1 % Final   • MCV 05/18/2023 90  82 - 98 fL Final   • MCH 05/18/2023 29.0  26.8 - 34.3 pg Final   • MCHC 05/18/2023 32.3  31.4 - 37.4 g/dL Final   • RDW 05/18/2023 13.0  11.6 - 15.1 % Final   • MPV 05/18/2023 9.6  8.9 - 12.7 fL Final   • Platelets 21/98/2539 237  149 - 390 Thousands/uL Final   • nRBC 05/18/2023 0  /100 WBCs Final   • Neutrophils Relative 05/18/2023 58  43 - 75 % Final   • Immat GRANS % 05/18/2023 1  0 - 2 % Final   • Lymphocytes Relative 05/18/2023 26  14 - 44 % Final   • Monocytes Relative 05/18/2023 8  4 - 12 % Final   • Eosinophils Relative 05/18/2023 6  0 - 6 % Final   • Basophils Relative 05/18/2023 1  0 - 1 % Final   • Neutrophils Absolute 05/18/2023 3.15  1.85 - 7.62 Thousands/µL Final   • Immature Grans Absolute 05/18/2023 0.04  0.00 - 0.20 Thousand/uL Final   • Lymphocytes Absolute 05/18/2023 1.36  0.60 - 4.47 Thousands/µL Final   • Monocytes Absolute 05/18/2023 0.41  0.17 - 1.22 Thousand/µL Final   • Eosinophils Absolute 05/18/2023 0.30  0.00 - 0.61 Thousand/µL Final   • Basophils Absolute 05/18/2023 0.05  0.00 - 0.10 Thousands/µL Final   • Sodium 05/18/2023 134 (L)  135 - 147 mmol/L Final   • Potassium 05/18/2023 3.9  3.5 - 5.3 mmol/L Final   • Chloride 05/18/2023 106  96 - 108 mmol/L Final   • CO2 05/18/2023 25  21 - 32 mmol/L Final   • ANION GAP 05/18/2023 3 (L)  4 - 13 mmol/L Final   • BUN 05/18/2023 12  5 - 25 mg/dL Final   • Creatinine 05/18/2023 0.49 (L)  0.60 - 1.30 mg/dL Final    Standardized to IDMS reference method   • Glucose 05/18/2023 83  65 - 140 mg/dL Final    Specimen collection should occur prior to Sulfasalazine administration due to the potential for falsely depressed results. Specimen collection should occur prior to Sulfapyridine administration due to the potential for falsely elevated results. If the patient is fasting, the ADA then defines impaired fasting glucose as > 100 mg/dL and diabetes as > or equal to 123 mg/dL. • Calcium 05/18/2023 8.9  8.3 - 10.1 mg/dL Final   • eGFR 05/18/2023 115  ml/min/1.73sq m Final   • COLOR 05/18/2023 [place] Dust   Final   • Size 05/18/2023 3x3  mm Final    Single piece received.    • Stone Weight 05/18/2023 9  mg Final   • Composition 05/18/2023 Comment   Final    Percentage (Represents the % composition)   • Ca Oxalate,Dihydrate 05/18/2023 20  % Final   • Ca Oxalate,Monohydr. 05/18/2023 75  % Final   • Comment 05/18/2023 Comment   Final    Calcium phosphate (hydroxyl form) includes hydroxyapatite,  amorphous calcium phosphate, and whitlockite. Hydroxyapatite  is the most common of the calcium phosphate salts found in  human kidney stones. • STONE COMMENT 05/18/2023 Comment   Final    Physician questions regarding Calculi Analysis contact  [facility] at: [phone]. • Please note 05/18/2023 Comment   Final    Calculi report will follow via computer, mail or   delivery. • Disclaimer 05/18/2023 Comment   Final    This test was developed and its performance characteristics  determined by [facility].  It has not been cleared or approved  by the Food and Drug Administration.    • Photo 05/18/2023 Comment   Final    Photograph will follow under a separate cover   • Stone Source 05/18/2023 Comment   Final    Left Kidney   • Hydroxyapatite 05/18/2023 5  % Final   Office Visit on 05/11/2023   Component Date Value Ref Range Status   • Urine Culture 05/11/2023 No Growth <1000 cfu/mL   Final   Admission on 05/03/2023, Discharged on 05/07/2023   Component Date Value Ref Range Status   • WBC 05/03/2023 6.70  4.31 - 10.16 Thousand/uL Final   • RBC 05/03/2023 5.56 (H)  3.81 - 5.12 Million/uL Final   • Hemoglobin 05/03/2023 16.9 (H)  11.5 - 15.4 g/dL Final   • Hematocrit 05/03/2023 49.9 (H)  34.8 - 46.1 % Final   • MCV 05/03/2023 90  82 - 98 fL Final   • MCH 05/03/2023 30.4  26.8 - 34.3 pg Final   • MCHC 05/03/2023 33.9  31.4 - 37.4 g/dL Final   • RDW 05/03/2023 13.1  11.6 - 15.1 % Final   • MPV 05/03/2023 9.4  8.9 - 12.7 fL Final   • Platelets 68/82/8066 208  149 - 390 Thousands/uL Final   • nRBC 05/03/2023 0  /100 WBCs Final   • Neutrophils Relative 05/03/2023 62  43 - 75 % Final   • Immat GRANS % 05/03/2023 0  0 - 2 % Final   • Lymphocytes Relative 05/03/2023 26  14 - 44 % Final   • Monocytes Relative 05/03/2023 6  4 - 12 % Final   • Eosinophils Relative 05/03/2023 5  0 - 6 % Final   • Basophils Relative 05/03/2023 1  0 - 1 % Final   • Neutrophils Absolute 05/03/2023 4.15  1.85 - 7.62 Thousands/µL Final   • Immature Grans Absolute 05/03/2023 0.02  0.00 - 0.20 Thousand/uL Final   • Lymphocytes Absolute 05/03/2023 1.74  0.60 - 4.47 Thousands/µL Final   • Monocytes Absolute 05/03/2023 0.40  0.17 - 1.22 Thousand/µL Final   • Eosinophils Absolute 05/03/2023 0.35  0.00 - 0.61 Thousand/µL Final   • Basophils Absolute 05/03/2023 0.04  0.00 - 0.10 Thousands/µL Final   • Sodium 05/03/2023 137  135 - 147 mmol/L Final   • Potassium 05/03/2023 4.6  3.5 - 5.3 mmol/L Final   • Chloride 05/03/2023 103  96 - 108 mmol/L Final   • CO2 05/03/2023 26  21 - 32 mmol/L Final   • ANION GAP 05/03/2023 8  4 - 13 mmol/L Final   • BUN 05/03/2023 14  5 - 25 mg/dL Final   • Creatinine 05/03/2023 0.72  0.60 - 1.30 mg/dL Final    Standardized to IDMS reference method   • Glucose 05/03/2023 110  65 - 140 mg/dL Final    If the patient is fasting, the ADA then defines impaired fasting glucose as > 100 mg/dL and diabetes as > or equal to 123 mg/dL.    • Calcium 05/03/2023 9.5  8.4 - 10.2 mg/dL Final   • eGFR 05/03/2023 99  ml/min/1.73sq m Final   • Magnesium 05/03/2023 2.1  1.9 - 2.7 mg/dL Final   • Preg, Serum 05/03/2023 Negative  Negative Final   • Color, UA 05/03/2023 Yellow  Yellow, Straw Final   • Clarity, UA 05/03/2023 Slightly Cloudy (A)  Hazy, Clear Final   • Specific Gravity, UA 05/03/2023 1.020  >1.005 - <1.030 Final   • pH, UA 05/03/2023 7.0  5.0, 5.5, 6.0, 6.5, 7.0, 7.5 Final   • Leukocytes, UA 05/03/2023 1+ (A)  Negative Final   • Nitrite, UA 05/03/2023 Negative  Negative Final   • Protein, UA 05/03/2023 Negative  Negative, Interference- unable to analyze mg/dl Final   • Glucose, UA 05/03/2023 Negative  Negative mg/dl Final   • Ketones, UA 05/03/2023 40 (2+) (A)  Negative mg/dl Final   • Urobilinogen, UA 05/03/2023 0.2  0.2, 1.0 E.U./dl E.U./dl Final   • Bilirubin, UA 05/03/2023 Negative  Negative Final   • Occult Blood, UA 05/03/2023 2+ (A)  Negative Final   • RBC, UA 05/03/2023 1-2  None Seen, 0-1, 1-2, 2-4, 0-5 /hpf Final   • WBC, UA 05/03/2023 20-30 (A)  None Seen, 0-1, 1-2, 0-5, 2-4 /hpf Final   • Epithelial Cells 05/03/2023 Occasional  None Seen, Occasional /hpf Final   • Bacteria, UA 05/03/2023 Innumerable (A)  None Seen, Occasional /hpf Final   • OTHER OBSERVATIONS 05/03/2023 WBCs Clumped   Final    SEE OTHER OBSERVATIONS RESULTS!!!   • Urine Culture 05/03/2023 >100,000 cfu/ml Escherichia coli (A)   Final    This organism has been edited. The previous result was Gram Negative [facility] on 5/5/2023 at 0826 EDT. • Sodium 05/04/2023 138  135 - 147 mmol/L Final   • Potassium 05/04/2023 3.9  3.5 - 5.3 mmol/L Final   • Chloride 05/04/2023 106  96 - 108 mmol/L Final   • CO2 05/04/2023 25  21 - 32 mmol/L Final   • ANION GAP 05/04/2023 7  4 - 13 mmol/L Final   • BUN 05/04/2023 15  5 - 25 mg/dL Final   • Creatinine 05/04/2023 0.78  0.60 - 1.30 mg/dL Final    Standardized to IDMS reference method   • Glucose 05/04/2023 97  65 - 140 mg/dL Final    If the patient is fasting, the ADA then defines impaired fasting glucose as > 100 mg/dL and diabetes as > or equal to 123 mg/dL. • Glucose, Fasting 05/04/2023 97  65 - 99 mg/dL Final   • Calcium 05/04/2023 8.8  8.4 - 10.2 mg/dL Final   • eGFR 05/04/2023 90  ml/min/1.73sq m Final   • Sodium 05/04/2023 138  135 - 147 mmol/L Final   • Potassium 05/04/2023 3.3 (L)  3.5 - 5.3 mmol/L Final   • Chloride 05/04/2023 106  96 - 108 mmol/L Final   • CO2 05/04/2023 24  21 - 32 mmol/L Final   • ANION GAP 05/04/2023 8  4 - 13 mmol/L Final   • BUN 05/04/2023 15  5 - 25 mg/dL Final   • Creatinine 05/04/2023 0.97  0.60 - 1.30 mg/dL Final    Standardized to IDMS reference method   • Glucose 05/04/2023 143 (H)  65 - 140 mg/dL Final    If the patient is fasting, the ADA then defines impaired fasting glucose as > 100 mg/dL and diabetes as > or equal to 123 mg/dL.    • Glucose, Fasting 05/04/2023 143 (H) 65 - 99 mg/dL Final   • Calcium 05/04/2023 8.3 (L)  8.4 - 10.2 mg/dL Final   • eGFR 05/04/2023 69  ml/min/1.73sq m Final   • WBC 05/04/2023 5.28  4.31 - 10.16 Thousand/uL Final   • RBC 05/04/2023 4.90  3.81 - 5.12 Million/uL Final    This is an appended report. These results have been appended to a previously preliminary verified report. • Hemoglobin 05/04/2023 14.7  11.5 - 15.4 g/dL Final   • Hematocrit 05/04/2023 44.7  34.8 - 46.1 % Final   • MCV 05/04/2023 91  82 - 98 fL Final   • MCH 05/04/2023 30.0  26.8 - 34.3 pg Final   • MCHC 05/04/2023 32.9  31.4 - 37.4 g/dL Final   • RDW 05/04/2023 13.1  11.6 - 15.1 % Final   • Platelets 01/11/9677 120 (L)  149 - 390 Thousands/uL Final    Comment: Manual Review of Smear Performed                             This is an appended report. These results have been appended to a previously preliminary verified report.    • MPV 05/04/2023 9.6  8.9 - 12.7 fL Final   • Urine Culture 05/04/2023 70,000-79,000 cfu/ml Escherichia coli (A)   Final   • Color, UA 05/04/2023 Ct (A)  Yellow, Straw Final   • Clarity, UA 05/04/2023 Cloudy (A)  Hazy, Clear Final   • Specific Gravity, UA 05/04/2023 >=1.030 (H)  >1.005 - <1.030 Final   • pH, UA 05/04/2023 6.0  5.0, 5.5, 6.0, 6.5, 7.0, 7.5 Final   • Leukocytes, UA 05/04/2023 1+ (A)  Negative Final   • Nitrite, UA 05/04/2023 Positive (A)  Negative Final   • Protein, UA 05/04/2023 1+ (A)  Negative, Interference- unable to analyze mg/dl Final   • Glucose, UA 05/04/2023 Negative  Negative mg/dl Final   • Ketones, UA 05/04/2023 Trace (A)  Negative mg/dl Final   • Urobilinogen, UA 05/04/2023 0.2  0.2, 1.0 E.U./dl E.U./dl Final   • Bilirubin, UA 05/04/2023 Negative  Negative Final   • Occult Blood, UA 05/04/2023 3+ (A)  Negative Final   • Urine Culture 05/04/2023 <10,000 cfu/ml Escherichia coli (A)   Final   • Color, UA 05/04/2023 Ct (A)  Yellow, Straw Final   • Clarity, UA 05/04/2023 Cloudy (A)  Hazy, Clear Final   • Specific [place], UA 05/04/2023 1.020  >1.005 - <1.030 Final   • pH, UA 05/04/2023 5.5  5.0, 5.5, 6.0, 6.5, 7.0, 7.5 Final   • Leukocytes, UA 05/04/2023 2+ (A)  Negative Final   • Nitrite, UA 05/04/2023 Negative  Negative Final   • Protein, UA 05/04/2023 2+ (A)  Negative, Interference- unable to analyze mg/dl Final   • Glucose, UA 05/04/2023 Trace (A)  Negative mg/dl Final   • Ketones, UA 05/04/2023 Trace (A)  Negative mg/dl Final   • Urobilinogen, UA 05/04/2023 0.2  0.2, 1.0 E.U./dl E.U./dl Final   • Bilirubin, UA 05/04/2023 2+ (A)  Negative Final   • Occult Blood, UA 05/04/2023 3+ (A)  Negative Final   • Urine Culture 05/04/2023 50,000-59,000 cfu/ml Escherichia coli (A)   Final   • Color, UA 05/04/2023 Ct (A)  Yellow, Straw Final   • Clarity, UA 05/04/2023 Slightly Cloudy (A)  Hazy, Clear Final   • Specific Gravity, UA 05/04/2023 1.010  >1.005 - <1.030 Final   • pH, UA 05/04/2023 5.5  5.0, 5.5, 6.0, 6.5, 7.0, 7.5 Final   • Leukocytes, UA 05/04/2023 Negative  Negative Final   • Nitrite, UA 05/04/2023 Negative  Negative Final   • Protein, UA 05/04/2023 1+ (A)  Negative, Interference- unable to analyze mg/dl Final   • Glucose, UA 05/04/2023 Negative  Negative mg/dl Final   • Ketones, UA 05/04/2023 Trace (A)  Negative mg/dl Final   • Urobilinogen, UA 05/04/2023 0.2  0.2, 1.0 E.U./dl E.U./dl Final   • Bilirubin, UA 05/04/2023 Negative  Negative Final   • Occult Blood, UA 05/04/2023 3+ (A)  Negative Final   • RBC, UA 05/04/2023 10-20 (A)  None Seen, 0-1, 1-2, 2-4, 0-5 /hpf Final   • WBC, UA 05/04/2023 30-50 (A)  None Seen, 0-1, 1-2, 0-5, 2-4 /hpf Final   • Epithelial Cells 05/04/2023 Occasional  None Seen, Occasional /hpf Final   • Bacteria, UA 05/04/2023 Moderate (A)  None Seen, Occasional /hpf Final   • MUCUS THREADS 05/04/2023 Moderate (A)  None Seen Final   • RBC, UA 05/04/2023 10-20 (A)  None Seen, 0-1, 1-2, 2-4, 0-5 /hpf Final   • WBC, UA 05/04/2023 0-1  None Seen, 0-1, 1-2, 0-5, 2-4 /hpf Final   • Epithelial Cells 05/04/2023 Occasional  None Seen, Occasional /hpf Final   • Bacteria, UA 05/04/2023 None Seen  None Seen, Occasional /hpf Final   • MUCUS THREADS 05/04/2023 Occasional (A)  None Seen Final   • RBC, UA 05/04/2023 10-20 (A)  None Seen, 0-1, 1-2, 2-4, 0-5 /hpf Final   • WBC, UA 05/04/2023 20-30 (A)  None Seen, 0-1, 1-2, 0-5, 2-4 /hpf Final   • Epithelial Cells 05/04/2023 Occasional  None Seen, Occasional /hpf Final   • Bacteria, UA 05/04/2023 Occasional  None Seen, Occasional /hpf Final   • MUCUS THREADS 05/04/2023 Occasional (A)  None Seen Final   • Sodium 05/05/2023 138  135 - 147 mmol/L Final   • Potassium 05/05/2023 4.0  3.5 - 5.3 mmol/L Final   • Chloride 05/05/2023 106  96 - 108 mmol/L Final   • CO2 05/05/2023 23  21 - 32 mmol/L Final   • ANION GAP 05/05/2023 9  4 - 13 mmol/L Final   • BUN 05/05/2023 24  5 - 25 mg/dL Final   • Creatinine 05/05/2023 1.06  0.60 - 1.30 mg/dL Final    Standardized to IDMS reference method   • Glucose 05/05/2023 79  65 - 140 mg/dL Final    If the patient is fasting, the ADA then defines impaired fasting glucose as > 100 mg/dL and diabetes as > or equal to 123 mg/dL.    • Calcium 05/05/2023 8.4  8.4 - 10.2 mg/dL Final   • eGFR 05/05/2023 62  ml/min/1.73sq m Final   • WBC 05/07/2023 5.37  4.31 - 10.16 Thousand/uL Final   • RBC 05/07/2023 4.29  3.81 - 5.12 Million/uL Final   • Hemoglobin 05/07/2023 12.8  11.5 - 15.4 g/dL Final   • Hematocrit 05/07/2023 39.3  34.8 - 46.1 % Final   • MCV 05/07/2023 92  82 - 98 fL Final   • MCH 05/07/2023 29.8  26.8 - 34.3 pg Final   • MCHC 05/07/2023 32.6  31.4 - 37.4 g/dL Final   • RDW 05/07/2023 13.3  11.6 - 15.1 % Final   • MPV 05/07/2023 11.2  8.9 - 12.7 fL Final   • Platelets 38/30/2078 102 (L)  149 - 390 Thousands/uL Final   • nRBC 05/07/2023 0  /100 WBCs Final   • Neutrophils Relative 05/07/2023 74  43 - 75 % Final   • Immat GRANS % 05/07/2023 1  0 - 2 % Final   • Lymphocytes Relative 05/07/2023 13 (L)  14 - 44 % Final   • Monocytes Relative 05/07/2023 8  4 - 12 % Final   • Eosinophils Relative 05/07/2023 3  0 - 6 % Final   • Basophils Relative 05/07/2023 1  0 - 1 % Final   • Neutrophils Absolute 05/07/2023 4.03  1.85 - 7.62 Thousands/µL Final   • Immature Grans Absolute 05/07/2023 0.04  0.00 - 0.20 Thousand/uL Final   • Lymphocytes Absolute 05/07/2023 0.68  0.60 - 4.47 Thousands/µL Final   • Monocytes Absolute 05/07/2023 0.41  0.17 - 1.22 Thousand/µL Final   • Eosinophils Absolute 05/07/2023 0.18  0.00 - 0.61 Thousand/µL Final   • Basophils Absolute 05/07/2023 0.03  0.00 - 0.10 Thousands/µL Final   • Sodium 05/07/2023 141  135 - 147 mmol/L Final   • Potassium 05/07/2023 3.9  3.5 - 5.3 mmol/L Final   • Chloride 05/07/2023 106  96 - 108 mmol/L Final   • CO2 05/07/2023 30  21 - 32 mmol/L Final   • ANION GAP 05/07/2023 5  4 - 13 mmol/L Final   • BUN 05/07/2023 21  5 - 25 mg/dL Final   • Creatinine 05/07/2023 0.54 (L)  0.60 - 1.30 mg/dL Final    Standardized to IDMS reference method   • Glucose 05/07/2023 115  65 - 140 mg/dL Final    If the patient is fasting, the ADA then defines impaired fasting glucose as > 100 mg/dL and diabetes as > or equal to 123 mg/dL. • Calcium 05/07/2023 8.5  8.4 - 10.2 mg/dL Final   • eGFR 05/07/2023 111  ml/min/1.73sq m Final   Appointment on 12/07/2022   Component Date Value Ref Range Status   • [place] 12/07/2022 60  44 - 115 ug/dL Final                                    Detection Limit = 5   • Vitamin B1, Whole Blood 12/07/2022 137.5  66.5 - 200.0 nmol/L Final   • Vitamin A 12/07/2022 34.7  20.1 - 62.0 ug/dL Final    Reference intervals for vitamin A determined from [facility] internal  studies. Individuals with vitamin A less than 20 ug/dL are considered  vitamin A deficient and those with serum concentrations less than  10 ug/dL are considered severely deficient. This test was developed and its performance characteristics  determined by [facility]. It has not been cleared or approved  by the Food and Drug Administration.    • PTH 12/07/2022 77.3 18.4 - 80.1 pg/mL Final   Hospital Outpatient Visit on 11/09/2022   Component Date Value Ref Range Status   • WBC 11/09/2022 3.53 (L)  4.31 - 10.16 Thousand/uL Final   • RBC 11/09/2022 5.04  3.81 - 5.12 Million/uL Final   • Hemoglobin 11/09/2022 15.5 (H)  11.5 - 15.4 g/dL Final   • Hematocrit 11/09/2022 46.7 (H)  34.8 - 46.1 % Final   • MCV 11/09/2022 93  82 - 98 fL Final   • MCH 11/09/2022 30.8  26.8 - 34.3 pg Final   • MCHC 11/09/2022 33.2  31.4 - 37.4 g/dL Final   • RDW 11/09/2022 13.2  11.6 - 15.1 % Final   • MPV 11/09/2022 9.5  8.9 - 12.7 fL Final   • Platelets 05/06/6463 143 (L)  149 - 390 Thousands/uL Final   • nRBC 11/09/2022 0  /100 WBCs Final   • Neutrophils Relative 11/09/2022 59  43 - 75 % Final   • Immat GRANS % 11/09/2022 1  0 - 2 % Final   • Lymphocytes Relative 11/09/2022 24  14 - 44 % Final   • Monocytes Relative 11/09/2022 11  4 - 12 % Final   • Eosinophils Relative 11/09/2022 4  0 - 6 % Final   • Basophils Relative 11/09/2022 1  0 - 1 % Final   • Neutrophils Absolute 11/09/2022 2.09  1.85 - 7.62 Thousands/µL Final   • Immature Grans Absolute 11/09/2022 0.04  0.00 - 0.20 Thousand/uL Final   • Lymphocytes Absolute 11/09/2022 0.86  0.60 - 4.47 Thousands/µL Final   • Monocytes Absolute 11/09/2022 0.38  0.17 - 1.22 Thousand/µL Final   • Eosinophils Absolute 11/09/2022 0.13  0.00 - 0.61 Thousand/µL Final   • Basophils Absolute 11/09/2022 0.03  0.00 - 0.10 Thousands/µL Final   • Sodium 11/09/2022 139  135 - 147 mmol/L Final   • Potassium 11/09/2022 3.6  3.5 - 5.3 mmol/L Final   • Chloride 11/09/2022 104  96 - 108 mmol/L Final   • CO2 11/09/2022 29  21 - 32 mmol/L Final   • ANION GAP 11/09/2022 6  4 - 13 mmol/L Final   • BUN 11/09/2022 19  5 - 25 mg/dL Final   • Creatinine 11/09/2022 0.58 (L)  0.60 - 1.30 mg/dL Final    Standardized to IDMS reference method   • Glucose 11/09/2022 88  65 - 140 mg/dL Final    If the patient is fasting, the ADA then defines impaired fasting glucose as > 100 mg/dL and diabetes as > or equal to 123 mg/dL. Specimen collection should occur prior to Sulfasalazine administration due to the potential for falsely depressed results. Specimen collection should occur prior to Sulfapyridine administration due to the potential for falsely elevated results. • Calcium 11/09/2022 8.9  8.4 - 10.2 mg/dL Final   • AST 11/09/2022 20  13 - 39 U/L Final    Specimen collection should occur prior to Sulfasalazine administration due to the potential for falsely depressed results. • ALT 11/09/2022 20  7 - 52 U/L Final    Specimen collection should occur prior to Sulfasalazine administration due to the potential for falsely depressed results. • Alkaline Phosphatase 11/09/2022 53  34 - 104 U/L Final   • Total Protein 11/09/2022 6.5  6.4 - 8.4 g/dL Final   • Albumin 11/09/2022 4.0  3.5 - 5.0 g/dL Final   • Total Bilirubin 11/09/2022 0.43  0.20 - 1.00 mg/dL Final   • eGFR 11/09/2022 109  ml/min/1.73sq m Final   • CRP 11/09/2022 1.1  <3.0 mg/L Final   • Sed Rate 11/09/2022 2  0 - 19 mm/hour Final   • Vitamin B-12 11/09/2022 300  100 - 900 pg/mL Final   • Folate 11/09/2022 8.1  3.1 - 17.5 ng/mL Final    Slightly Hemolyzed; Results May be Affected   • Iron Saturation 11/09/2022 30  15 - 50 % Final   • TIBC 11/09/2022 270  250 - 450 ug/dL Final   • Iron 11/09/2022 80  50 - 170 ug/dL Final    Patients treated with metal-binding drugs (ie. Deferoxamine) may have depressed iron values.    • Ferritin 11/09/2022 123  8 - 388 ng/mL Final

## 2023-11-07 NOTE — LETTER
November 7, 2023     Ronal Davis, [address] [place] [address]    Patient: Edita Jean Baptiste   YOB: 1975   Date of Visit: 11/7/2023       Dear Dr. Buffy Burt: Thank you for referring Ashley Neel to me for evaluation. Below are my notes for this consultation. If you have questions, please do not hesitate to call me. I look forward to following your patient along with you. Sincerely,        Dee Chen DO        CC: No Recipients    Dee Chen DO  11/7/2023 12:53 PM  Signed  Assessment/Plan:    Preoperative general physical examination  -patient is scheduled excision exostosis of the right 5th toe by Dr Ronal Davis, on 11/16/2023, using choice anesthesia at [address] [place] [address] [address] for Exostosis and pain of the right 5th toe  - she functions at 4 Mets of physical activity daily  -Lab results have been reviewed  - her revised cardiac risk index by Cloyde Clitherall criteria shows very low risk with an estimated rate of myocardial infarction, pulmonary edema, ventricular fibrillation, cardiac arrest or complete heart block of 0.4%  -she may proceed with surgery with care because of her comorbidities and history of nausea and vomiting with anesthesia sometimes. - she should inform her PCP and her surgeon if her clinical status should change prior to surgery  -will fax completed pre-op notes to surgeon    Exostosis of bone of foot  -Scheduled for surgery on 11/16/2023    Iron deficiency anemia  -Resolved, and currently with elevated hemoglobin.   Recent CBC done on 11/6/2023 showed a hemoglobin of 16.5, up from 15.4 on 7/19/2023 and up from 13.7 on 5/18/2023  -Patient was counseled to cut down on her iron supplements, from 3 tablets daily to 1 tablet daily  -We will order a repeat CBC to be done 1 month    Diagnoses and all orders for this visit:    Preoperative general physical examination    Exostosis of bone of foot    Iron deficiency anemia secondary to inadequate dietary iron intake  -     CBC and differential; Future    Elevated hemoglobin (HCC)  -     CBC and differential; Future         Subjective:     Patient ID: Elio Saab is a 51 y.o. female. HPI    Patient presents for preoperative physical exam. She is scheduled for excision exostosis of the right 5th toe by Dr Artemio Merlos, on 11/16/2023, using choice anesthesia at [id number] [place] [address] [address] for Exostosis and pain of the right 5th toe    Past medical history- exostosis of the right fifth toe, GERD, Chronic idiopathic constipation, migraine, pseudotumor cerebri, kidney stones, iron def anemia,     Past surgical history- three shunts - , [facility] and [facility] for ICH, gastric sleeve, breast reduction sx, strabismus sx, hysterectomy, wisdom teeth extraction, laparotomy, breast biopsy left, hiatal hernia repair, lithotripsy and stent placement    Medication- see list    Allergies- see list     History of previous adverse reaction to anesthesia-nausea and vomiting sometimes     Family history of adverse reaction to anesthesia- none known    Physical activity level-able to climb 2 flights of stairs without sob    Preop labs- done and will review    Preop EKG- not ordered    Anticoagulation use - none    Anti-platelet use - none    NSAID use- none    Alcohol use - none    Nicotine use - quit smoking in 2012, smoked for about 20 years on and off    Recreational drug use - medical marijuana lotion and vaping     Care after surgery - boyfriend    Today, patient  c/o pain in the right foot. She states that she has been feeling off for the past week on and off  Occasionally feels dizzy and lightheaded with headache when it is rainy.   She also admits to rhinorrhea, occasional palpitations but denies fever, chills, night sweats, nasal congestion, sinus pain or pressure, sore throat, cough, chest pain, shortness of breath, wheezing, nausea, vomiting, abdominal pain, diarrhea, constipation, myalgias, feelings of anxiety, depression or insomnia. The following portions of the patient's history were reviewed and updated as appropriate: She  has a past medical history of Abdominal pain, Acute cystitis with hematuria (1/3/2020), Brain condition, Chronic kidney disease, COVID-19 virus infection (11/10/2022), Sheri Boyd Quervain's disease (tenosynovitis), Esophageal perforation, Gastric leak, GERD (gastroesophageal reflux disease), Headache, Idiopathic intracranial hypertension, Kidney stone, Migraine, Moderate protein-calorie malnutrition ([address]) (2/21/2018), No blood products, Papilledema, both eyes, PONV (postoperative nausea and vomiting), Postgastrectomy malabsorption, Presence of lumboperitoneal shunt, Rotator cuff tendinitis, and Visual field defect.   She   Patient Active Problem List    Diagnosis Date Noted   • B12 deficiency 07/25/2023   • PONV (postoperative nausea and vomiting)    • Obstruction of left ureteropelvic junction (UPJ) due to stone 05/03/2023   • Chronic idiopathic constipation 09/23/2022   • Macromastia 04/01/2022   • S/P bilateral breast reduction 04/01/2022   • Annual physical exam 12/17/2021   • Iron deficiency anemia secondary to inadequate dietary iron intake 11/09/2021   • Migraine with aura and without status migrainosus, not intractable 04/30/2021   • Encounter for surgical aftercare following surgery of digestive system 09/24/2020   • Papilledema 12/10/2019   • Postgastrectomy malabsorption 09/20/2019   • History of idiopathic intracranial hypertension 03/08/2019   • Chronic tension-type headache, intractable 03/08/2019   • PTSD (post-traumatic stress disorder) 01/10/2019   • Renal calculi 10/04/2018   • Refusal of influenza vaccine by provider 09/28/2018   • Bariatric surgery status 06/22/2018   • Choroidal nevus, right eye 02/03/2018   • Refusal of blood product 01/31/2018   • Murmur, cardiac 01/26/2018   • S/P  shunt 01/24/2018   • Gastroesophageal reflux disease 12/15/2017   • Hypercholesteremia 08/11/2017   • Pseudotumor cerebri 05/31/2017   • Mixed incontinence 04/03/2017   • Subacromial bursitis of right shoulder joint 09/16/2015   • Family history of malignant neoplasm of gastrointestinal tract 07/09/2013     She  has a past surgical history that includes CSF shunt; Gastric bypass (12/19/2016); Hysterectomy (03/14/2013); pr crtj shunt yfjjgadzol-iofmdufay-euhbisk terminus (Right, 05/31/2017); ESOPHAGOSCOPY WITH STENT INSERTION (N/A, 01/24/2018); Eye surgery (Bilateral, 1980); pr rplcmt/revj csf shunt valve/cath shunt sys (Right, 01/25/2018); LAPAROTOMY (N/A, 01/25/2018); pr rmvl compl csf shunt system w/o rplcmt shunt (Right, 02/05/2018); Esophagogastroduodenoscopy (N/A, 02/23/2018); Esophagogastroduodenoscopy (N/A, 02/23/2018); Esophagogastroduodenoscopy (N/A, 03/28/2018); Esophagogastroduodenoscopy (N/A, 04/05/2018); Esophagogastroduodenoscopy (N/A, 04/10/2018); pr egd transoral biopsy single/multiple (N/A, 06/27/2018); IR lumbar puncture (08/29/2018); Lumbar peritoneal shunt (11/19/2015); Wrist surgery (Right); pr crtj shunt zfsqsnykbh-pbc-udd-aur (Right, 12/18/2019); pr cysto bladder w/ureteral catheterization (N/A, 06/06/2020); Hiatal hernia repair; [place] retrograde pyelogram (06/24/2020); pr cysto/uretero w/lithotripsy &indwell stent insrt (Bilateral, 06/24/2020); pr cysto/uretero w/lithotripsy &indwell stent insrt (Left, 08/21/2021); [place] retrograde pyelogram (08/21/2021); Breast biopsy (Left, 1993); pr breast reduction (Bilateral, 03/25/2022); Reduction mammaplasty (Bilateral); pr cysto bladder w/ureteral catheterization (Bilateral, 5/4/2023); FL retrograde pyelogram (5/4/2023); pr cysto/uretero w/lithotripsy &indwell stent insrt (N/A, 5/18/2023); and FL retrograde pyelogram (5/18/2023).   Her family history includes Cancer in her family and paternal grandfather; Colon cancer in her family; Colon cancer (age of onset: 44) in her maternal grandfather; Kidney cancer (age of onset: 77) in her mother; Leukemia in her family; Lung cancer (age of onset: 64) in her maternal uncle; No Known Problems in her daughter, father, maternal aunt, maternal aunt, maternal grandmother, paternal aunt, paternal grandmother, and sister; Pancreatic cancer in her family; Thyroid cancer (age of onset: 36) in her brother. She  reports that she quit smoking about 11 years ago. Her smoking use included cigarettes. She started smoking about 31 years ago. She has a 10.00 pack-year smoking history. She has never used smokeless tobacco. She reports that she does not currently use drugs after having used the following drugs: Marijuana. Frequency: 7.00 times per week. She reports that she does not drink alcohol.   Current Outpatient Medications   Medication Sig Dispense Refill   • ascorbic acid (VITAMIN C) 250 MG CHEW Chew 250 mg daily     • Biotin 1 MG CAPS Take 1 mg by mouth daily       • calcium citrate-vitamin D (CITRACAL+D) 315-200 MG-UNIT per tablet Take 1 tablet by mouth 2 (two) times a day     • esomeprazole (NexIUM) 20 mg capsule Take 1 capsule (20 mg total) by mouth 2 (two) times a day before meals 60 capsule 3   • famotidine (PEPCID) 40 MG tablet take 1 tablet by mouth once daily 30 tablet 2   • ferrous gluconate 324 (37.5 Fe) mg Take 324 mg by mouth 2 (two) times a day before meals     • folic acid (FOLVITE) 1 mg tablet Take 2 tablets (2 mg total) by mouth daily 180 tablet 3   • Multiple Vitamin (MULTIVITAMIN) tablet Take 1 tablet by mouth daily Wears a patch     • Multiple Vitamins-Minerals (Hair/Skin/Nails) CAPS Take 1 tablet by mouth 2 (two) times a day       • rimegepant sulfate (Nurtec) 75 mg TBDP Take 1 tablet (75 mg total) by mouth as needed (migraine) Limit of 1 in 24 hours 16 tablet 6   • vitamin B-12 (VITAMIN B-12) 500 mcg tablet Take 500 mcg by mouth daily     • acetaZOLAMIDE (DIAMOX) 250 mg tablet take 1 tablet by mouth three times a day (Patient not taking: Reported on 9/2/2023) 45 tablet 0   • dexamethasone (DECADRON) 2 mg tablet Take 1 tablet (2 mg total) by mouth daily with breakfast (Patient not taking: Reported on 9/2/2023) 5 tablet 0   • Dihydroergotamine Mesylate HFA (Trudhesa) 0.725 MG/ACT AERS 0.725 mg into each nostril if needed (migraine) Use at onset of migraine. May repeat in 1 hour if needed. Limit of 3 a week or 8 a month. Do not use within 24 hours of a triptan. (Patient not taking: Reported on 9/2/2023) 8 mL 11     No current facility-administered medications for this visit. Current Outpatient Medications on File Prior to Visit   Medication Sig   • ascorbic acid (VITAMIN C) 250 MG CHEW Chew 250 mg daily   • Biotin 1 MG CAPS Take 1 mg by mouth daily     • calcium citrate-vitamin D (CITRACAL+D) 315-200 MG-UNIT per tablet Take 1 tablet by mouth 2 (two) times a day   • esomeprazole (NexIUM) 20 mg capsule Take 1 capsule (20 mg total) by mouth 2 (two) times a day before meals   • famotidine (PEPCID) 40 MG tablet take 1 tablet by mouth once daily   • ferrous gluconate 324 (37.5 Fe) mg Take 324 mg by mouth 2 (two) times a day before meals   • folic acid (FOLVITE) 1 mg tablet Take 2 tablets (2 mg total) by mouth daily   • Multiple Vitamin (MULTIVITAMIN) tablet Take 1 tablet by mouth daily Wears a patch   • Multiple Vitamins-Minerals (Hair/Skin/Nails) CAPS Take 1 tablet by mouth 2 (two) times a day     • rimegepant sulfate (Nurtec) 75 mg TBDP Take 1 tablet (75 mg total) by mouth as needed (migraine) Limit of 1 in 24 hours   • vitamin B-12 (VITAMIN B-12) 500 mcg tablet Take 500 mcg by mouth daily   • acetaZOLAMIDE (DIAMOX) 250 mg tablet take 1 tablet by mouth three times a day (Patient not taking: Reported on 9/2/2023)   • dexamethasone (DECADRON) 2 mg tablet Take 1 tablet (2 mg total) by mouth daily with breakfast (Patient not taking: Reported on 9/2/2023)   • Dihydroergotamine Mesylate HFA (Trudhesa) 0.725 MG/ACT AERS 0.725 mg into each nostril if needed (migraine) Use at onset of migraine. May repeat in 1 hour if needed. Limit of 3 a week or 8 a month. Do not use within 24 hours of a triptan. (Patient not taking: Reported on 9/2/2023)   • [DISCONTINUED] furosemide (LASIX) 20 mg tablet take 1 tablet by mouth twice a day (Patient not taking: Reported on 9/2/2023)     No current facility-administered medications on file prior to visit. She is allergic to benadryl [diphenhydramine] and phenergan [promethazine]. .    Review of Systems   Constitutional:  Negative for activity change, chills, fatigue, fever and unexpected weight change. HENT:  Positive for rhinorrhea (on and off). Negative for ear pain, postnasal drip, sinus pressure and sore throat. Eyes:  Negative for pain. Watery eyes    Respiratory:  Negative for cough, choking, chest tightness, shortness of breath and wheezing. Cardiovascular:  Positive for palpitations (on and off). Negative for chest pain and leg swelling. Gastrointestinal:  Negative for abdominal pain, constipation, diarrhea, nausea and vomiting. Heart burn always   Genitourinary:  Negative for dysuria and hematuria. Musculoskeletal:  Positive for arthralgias. Negative for back pain, gait problem, joint swelling, myalgias and neck stiffness. Skin:  Negative for pallor and rash. Neurological:  Positive for dizziness, light-headedness (on and off) and headaches. Negative for tremors, seizures and syncope. Hematological:  Negative for adenopathy. Psychiatric/Behavioral:  Negative for behavioral problems. Objective:      /78 (BP Location: Left arm, Patient Position: Standing, Cuff Size: Standard)   Pulse 55   Temp 97.8 °F (36.6 °C) (Temporal)   Ht 5' 3" (1.6 m)   Wt 67.1 kg (148 lb)   LMP  (LMP Unknown)   SpO2 97%   BMI 26.22 kg/m²         Physical Exam  Constitutional:       General: She is not in acute distress. Appearance: She is well-developed. She is not diaphoretic. HENT:      Head: Normocephalic and atraumatic.       Right Ear: External ear normal. Left Ear: External ear normal.      Nose: Congestion present. Mouth/Throat:      Mouth: Mucous membranes are dry. Pharynx: Posterior oropharyngeal erythema present. No oropharyngeal exudate. Eyes:      General: No scleral icterus. Right eye: No discharge. Left eye: No discharge. Conjunctiva/sclera: Conjunctivae normal.      Pupils: Pupils are equal, round, and reactive to light. Neck:      Thyroid: No thyromegaly. Vascular: No JVD. Trachea: No tracheal deviation. Cardiovascular:      Rate and Rhythm: Regular rhythm. Bradycardia present. Heart sounds: Normal heart sounds. No murmur heard. No friction rub. No gallop. Comments: Bradycardia with a heart rate of 55 bpm  Pulmonary:      Effort: Pulmonary effort is normal. No respiratory distress. Breath sounds: Normal breath sounds. No wheezing or rales. Chest:      Chest wall: No tenderness. Abdominal:      General: Bowel sounds are normal. There is no distension. Palpations: Abdomen is soft. There is no mass. Tenderness: There is no abdominal tenderness. There is no guarding or rebound. Musculoskeletal:         General: No deformity. Normal range of motion. Cervical back: Normal range of motion and neck supple. Right foot: Tenderness (tenderness of the lateral aspect of the right fifth toe) present. Lymphadenopathy:      Cervical: No cervical adenopathy. Skin:     General: Skin is warm and dry. Coloration: Skin is not pale. Findings: No erythema or rash. Neurological:      Mental Status: She is alert and oriented to person, place, and time. Cranial Nerves: No cranial nerve deficit. Motor: No abnormal muscle tone. Coordination: Coordination normal.      Deep Tendon Reflexes: Reflexes are normal and symmetric.    Psychiatric:         Behavior: Behavior normal.          Appointment on 11/06/2023   Component Date Value Ref Range Status   • CRP 11/06/2023 1.0  <3.0 mg/L Final   • Sed Rate 11/06/2023 <1  0 - 19 mm/hour Final   • Sodium 11/06/2023 141  135 - 147 mmol/L Final   • Potassium 11/06/2023 4.1  3.5 - 5.3 mmol/L Final   • Chloride 11/06/2023 106  96 - 108 mmol/L Final   • CO2 11/06/2023 27  21 - 32 mmol/L Final   • ANION GAP 11/06/2023 8  mmol/L Final   • BUN 11/06/2023 24  5 - 25 mg/dL Final   • Creatinine 11/06/2023 0.65  0.60 - 1.30 mg/dL Final    Standardized to IDMS reference method   • Glucose 11/06/2023 82  65 - 140 mg/dL Final    If the patient is fasting, the ADA then defines impaired fasting glucose as > 100 mg/dL and diabetes as > or equal to 123 mg/dL. • Calcium 11/06/2023 9.3  8.4 - 10.2 mg/dL Final   • AST 11/06/2023 12 (L)  13 - 39 U/L Final   • ALT 11/06/2023 10  7 - 52 U/L Final    Specimen collection should occur prior to Sulfasalazine administration due to the potential for falsely depressed results. • Alkaline Phosphatase 11/06/2023 67  34 - 104 U/L Final   • Total Protein 11/06/2023 6.7  6.4 - 8.4 g/dL Final   • Albumin 11/06/2023 4.2  3.5 - 5.0 g/dL Final   • Total Bilirubin 11/06/2023 0.35  0.20 - 1.00 mg/dL Final    Use of this assay is not recommended for patients undergoing treatment with eltrombopag due to the potential for falsely elevated results. N-acetyl-p-benzoquinone imine (metabolite of Acetaminophen) will generate erroneously low results in samples for patients that have taken an overdose of Acetaminophen.    • eGFR 11/06/2023 104  ml/min/1.73sq m Final   • WBC 11/06/2023 7.98  4.31 - 10.16 Thousand/uL Final   • RBC 11/06/2023 5.55 (H)  3.81 - 5.12 Million/uL Final   • Hemoglobin 11/06/2023 16.5 (H)  11.5 - 15.4 g/dL Final   • Hematocrit 11/06/2023 51.0 (H)  34.8 - 46.1 % Final   • MCV 11/06/2023 92  82 - 98 fL Final   • MCH 11/06/2023 29.7  26.8 - 34.3 pg Final   • MCHC 11/06/2023 32.4  31.4 - 37.4 g/dL Final   • RDW 11/06/2023 12.5  11.6 - 15.1 % Final   • Platelets 27/11/6841 182  149 - 390 Thousands/uL Final   • MPV 11/06/2023 9.9  8.9 - 12.7 fL Final   Office Visit on 09/02/2023   Component Date Value Ref Range Status   •  RAPID STREP A 09/02/2023 Negative  Negative Final   • POCT SARS-CoV-2 Ag 09/02/2023 Negative  Negative Final   • VALID CONTROL 09/02/2023 Valid   Final   • Throat Culture 09/02/2023 Few Colonies of Beta Hemolytic Streptococcus NOT Group A, C, or G   Final   Hospital Outpatient Visit on 07/19/2023   Component Date Value Ref Range Status   • WBC 07/19/2023 5.01  4.31 - 10.16 Thousand/uL Final   • RBC 07/19/2023 5.09  3.81 - 5.12 Million/uL Final   • Hemoglobin 07/19/2023 15.4  11.5 - 15.4 g/dL Final   • Hematocrit 07/19/2023 45.0  34.8 - 46.1 % Final   • MCV 07/19/2023 88  82 - 98 fL Final   • MCH 07/19/2023 30.3  26.8 - 34.3 pg Final   • MCHC 07/19/2023 34.2  31.4 - 37.4 g/dL Final   • RDW 07/19/2023 13.0  11.6 - 15.1 % Final   • MPV 07/19/2023 10.7  8.9 - 12.7 fL Final   • Platelets 50/66/4595 181  149 - 390 Thousands/uL Final   • nRBC 07/19/2023 0  /100 WBCs Final   • Neutrophils Relative 07/19/2023 61  43 - 75 % Final   • Immat GRANS % 07/19/2023 0  0 - 2 % Final   • Lymphocytes Relative 07/19/2023 28  14 - 44 % Final   • Monocytes Relative 07/19/2023 6  4 - 12 % Final   • Eosinophils Relative 07/19/2023 4  0 - 6 % Final   • Basophils Relative 07/19/2023 1  0 - 1 % Final   • Neutrophils Absolute 07/19/2023 3.06  1.85 - 7.62 Thousands/µL Final   • Immature Grans Absolute 07/19/2023 0.02  0.00 - 0.20 Thousand/uL Final   • Lymphocytes Absolute 07/19/2023 1.39  0.60 - 4.47 Thousands/µL Final   • Monocytes Absolute 07/19/2023 0.28  0.17 - 1.22 Thousand/µL Final   • Eosinophils Absolute 07/19/2023 0.22  0.00 - 0.61 Thousand/µL Final   • Basophils Absolute 07/19/2023 0.04  0.00 - 0.10 Thousands/µL Final   • Sodium 07/19/2023 137  135 - 147 mmol/L Final   • Potassium 07/19/2023 4.1  3.5 - 5.3 mmol/L Final    Slightly Hemolyzed:Results may be affected.    • Chloride 07/19/2023 103  96 - 108 mmol/L Final   • CO2 07/19/2023 24  21 - 32 mmol/L Final   • ANION GAP 07/19/2023 10  mmol/L Final   • BUN 07/19/2023 17  5 - 25 mg/dL Final   • Creatinine 07/19/2023 0.68  0.60 - 1.30 mg/dL Final    Standardized to IDMS reference method   • Glucose 07/19/2023 82  65 - 140 mg/dL Final    If the patient is fasting, the ADA then defines impaired fasting glucose as > 100 mg/dL and diabetes as > or equal to 123 mg/dL. • Calcium 07/19/2023 9.2  8.4 - 10.2 mg/dL Final   • AST 07/19/2023 18  13 - 39 U/L Final    Slightly Hemolyzed:Results may be affected. • ALT 07/19/2023 7  7 - 52 U/L Final    Specimen collection should occur prior to Sulfasalazine administration due to the potential for falsely depressed results. • Alkaline Phosphatase 07/19/2023 49  34 - 104 U/L Final   • Total Protein 07/19/2023 6.3 (L)  6.4 - 8.4 g/dL Final   • Albumin 07/19/2023 4.1  3.5 - 5.0 g/dL Final   • Total Bilirubin 07/19/2023 0.41  0.20 - 1.00 mg/dL Final    Use of this assay is not recommended for patients undergoing treatment with eltrombopag due to the potential for falsely elevated results. N-acetyl-p-benzoquinone imine (metabolite of Acetaminophen) will generate erroneously low results in samples for patients that have taken an overdose of Acetaminophen. • eGFR 07/19/2023 103  ml/min/1.73sq m Final   • CRP 07/19/2023 1.2  <3.0 mg/L Final   • Sed Rate 07/19/2023 2  0 - 19 mm/hour Final   • Vitamin B-12 07/19/2023 203  180 - 914 pg/mL Final   • LD 07/19/2023 229  140 - 271 U/L Final    Slightly Hemolyzed:Results may be affected. • Folate 07/19/2023 11.3  >5.9 ng/mL Final    The World Health Organization has determined deficient folate concentrations are considered to be <4.0 ng/mL. • Erythropoietin 07/19/2023 9.1  2.6 - 18.5 mIU/mL Final    Teto Cr[employer] DxI 800 Immunoassay System  Values obtained with different assay methods or kits cannot be used  interchangeably.  Results cannot be interpreted as absolute evidence  of the presence or absence of malignant disease. • TSH 3RD GENERATON 07/19/2023 0.888  0.450 - 4.500 uIU/mL Final    The recommended reference ranges for TSH during pregnancy are as follows:   First trimester 0.1 to 2.5 uIU/mL   Second trimester  0.2 to 3.0 uIU/mL   Third trimester 0.3 to 3.0 uIU/m    Note: Normal ranges may not apply to patients who are transgender, non-binary, or whose legal sex, sex at birth, and gender identity differ. Adult TSH (3rd generation) reference range follows the recommended guidelines of the American Thyroid Association, January, 2020. • Vit D, 25-Hydroxy 07/19/2023 73.4  30.0 - 100.0 ng/mL Final    Vitamin D guidelines established by Clinical Guidelines Subcommittee  of the Endocrine Society Task Force, 2011    Deficiency <20ng/ml   Insufficiency 20-30ng/ml   Sufficient  ng/ml    • Iron Saturation 07/19/2023 24  15 - 50 % Final   • TIBC 07/19/2023 326  250 - 450 ug/dL Final   • Iron 07/19/2023 78  50 - 170 ug/dL Final    Patients treated with metal-binding drugs (ie. Deferoxamine) may have depressed iron values.    • Ferritin 07/19/2023 39  11 - 307 ng/mL Final   Appointment on 06/12/2023   Component Date Value Ref Range Status   • Carbon Monoxide, Blood 06/12/2023 1.9 (H)  0.0 - 1.5 % Final   Admission on 05/16/2023, Discharged on 05/18/2023   Component Date Value Ref Range Status   • WBC 05/16/2023 7.09  4.31 - 10.16 Thousand/uL Final   • RBC 05/16/2023 5.11  3.81 - 5.12 Million/uL Final   • Hemoglobin 05/16/2023 15.1  11.5 - 15.4 g/dL Final   • Hematocrit 05/16/2023 45.0  34.8 - 46.1 % Final   • MCV 05/16/2023 88  82 - 98 fL Final   • MCH 05/16/2023 29.5  26.8 - 34.3 pg Final   • MCHC 05/16/2023 33.6  31.4 - 37.4 g/dL Final   • RDW 05/16/2023 13.2  11.6 - 15.1 % Final   • MPV 05/16/2023 9.8  8.9 - 12.7 fL Final   • Platelets 56/89/3431 290  149 - 390 Thousands/uL Final   • nRBC 05/16/2023 0  /100 WBCs Final   • Neutrophils Relative 05/16/2023 70  43 - 75 % Final   • Immat GRANS % 05/16/2023 1  0 - 2 % Final   • Lymphocytes Relative 05/16/2023 18  14 - 44 % Final   • Monocytes Relative 05/16/2023 7  4 - 12 % Final   • Eosinophils Relative 05/16/2023 3  0 - 6 % Final   • Basophils Relative 05/16/2023 1  0 - 1 % Final   • Neutrophils Absolute 05/16/2023 5.03  1.85 - 7.62 Thousands/µL Final   • Immature Grans Absolute 05/16/2023 0.06  0.00 - 0.20 Thousand/uL Final   • Lymphocytes Absolute 05/16/2023 1.27  0.60 - 4.47 Thousands/µL Final   • Monocytes Absolute 05/16/2023 0.49  0.17 - 1.22 Thousand/µL Final   • Eosinophils Absolute 05/16/2023 0.19  0.00 - 0.61 Thousand/µL Final   • Basophils Absolute 05/16/2023 0.05  0.00 - 0.10 Thousands/µL Final   • Sodium 05/16/2023 136  135 - 147 mmol/L Final   • Potassium 05/16/2023 4.1  3.5 - 5.3 mmol/L Final   • Chloride 05/16/2023 110 (H)  96 - 108 mmol/L Final   • CO2 05/16/2023 27  21 - 32 mmol/L Final   • ANION GAP 05/16/2023 -1 (L)  4 - 13 mmol/L Final   • BUN 05/16/2023 19  5 - 25 mg/dL Final   • Creatinine 05/16/2023 0.67  0.60 - 1.30 mg/dL Final    Standardized to IDMS reference method   • Glucose 05/16/2023 102  65 - 140 mg/dL Final    If the patient is fasting, the ADA then defines impaired fasting glucose as > 100 mg/dL and diabetes as > or equal to 123 mg/dL. Specimen collection should occur prior to Sulfasalazine administration due to the potential for falsely depressed results. Specimen collection should occur prior to Sulfapyridine administration due to the potential for falsely elevated results. • Calcium 05/16/2023 8.9  8.3 - 10.1 mg/dL Final   • Corrected Calcium 05/16/2023 9.5  8.3 - 10.1 mg/dL Final   • AST 05/16/2023 8  5 - 45 U/L Final    Specimen collection should occur prior to Sulfasalazine administration due to the potential for falsely depressed results.     • ALT 05/16/2023 15  12 - 78 U/L Final    Specimen collection should occur prior to Sulfasalazine and/or Sulfapyridine administration due to the potential for falsely depressed results. • Alkaline Phosphatase 05/16/2023 53  46 - 116 U/L Final   • Total Protein 05/16/2023 6.4  6.4 - 8.4 g/dL Final   • Albumin 05/16/2023 3.2 (L)  3.5 - 5.0 g/dL Final   • Total Bilirubin 05/16/2023 0.43  0.20 - 1.00 mg/dL Final    Use of this assay is not recommended for patients undergoing treatment with eltrombopag due to the potential for falsely elevated results.    • eGFR 05/16/2023 104  ml/min/1.73sq m Final   • Color, UA 05/16/2023 Yellow   Final   • Clarity, UA 05/16/2023 Hazy   Final   • Specific Gravity, UA 05/16/2023 1.018  1.003 - 1.030 Final   • pH, UA 05/16/2023 7.5  4.5, 5.0, 5.5, 6.0, 6.5, 7.0, 7.5, 8.0 Final   • Leukocytes, UA 05/16/2023 Small (A)  Negative Final   • Nitrite, UA 05/16/2023 Negative  Negative Final   • Protein, UA 05/16/2023 50 (1+) (A)  Negative mg/dl Final   • Glucose, UA 05/16/2023 Negative  Negative mg/dl Final   • Ketones, UA 05/16/2023 Negative  Negative mg/dl Final   • Urobilinogen, UA 05/16/2023 <2.0  <2.0 mg/dl mg/dl Final   • Bilirubin, UA 05/16/2023 Negative  Negative Final   • Occult Blood, UA 05/16/2023 Large (A)  Negative Final   • RBC, UA 05/16/2023 Innumerable (A)  None Seen, 1-2 /hpf Final   • WBC, UA 05/16/2023 30-50 (A)  None Seen, 1-2 /hpf Final   • Epithelial Cells 05/16/2023 Moderate (A)  None Seen, Occasional /hpf Final   • Bacteria, UA 05/16/2023 None Seen  None Seen, Occasional /hpf Final   • MUCUS THREADS 05/16/2023 Innumerable (A)  None Seen Final   • Hyaline Casts, UA 05/16/2023 5-10 (A)  None Seen /lpf Final   • Urine Culture 05/16/2023 50,000-59,000 cfu/ml   Final    Mixed Contaminants X4   • Sodium 05/17/2023 135  135 - 147 mmol/L Final   • Potassium 05/17/2023 3.8  3.5 - 5.3 mmol/L Final   • Chloride 05/17/2023 108  96 - 108 mmol/L Final   • CO2 05/17/2023 27  21 - 32 mmol/L Final   • ANION GAP 05/17/2023 0 (L)  4 - 13 mmol/L Final   • BUN 05/17/2023 17  5 - 25 mg/dL Final   • Creatinine 05/17/2023 0.62  0.60 - 1.30 mg/dL Final Standardized to IDMS reference method   • Glucose 05/17/2023 91  65 - 140 mg/dL Final    Specimen collection should occur prior to Sulfasalazine administration due to the potential for falsely depressed results. Specimen collection should occur prior to Sulfapyridine administration due to the potential for falsely elevated results. If the patient is fasting, the ADA then defines impaired fasting glucose as > 100 mg/dL and diabetes as > or equal to 123 mg/dL.    • Calcium 05/17/2023 8.4  8.3 - 10.1 mg/dL Final   • eGFR 05/17/2023 106  ml/min/1.73sq m Final   • WBC 05/17/2023 5.24  4.31 - 10.16 Thousand/uL Final   • RBC 05/17/2023 4.65  3.81 - 5.12 Million/uL Final   • Hemoglobin 05/17/2023 13.8  11.5 - 15.4 g/dL Final   • Hematocrit 05/17/2023 41.2  34.8 - 46.1 % Final   • MCV 05/17/2023 89  82 - 98 fL Final   • MCH 05/17/2023 29.7  26.8 - 34.3 pg Final   • MCHC 05/17/2023 33.5  31.4 - 37.4 g/dL Final   • RDW 05/17/2023 13.2  11.6 - 15.1 % Final   • MPV 05/17/2023 9.4  8.9 - 12.7 fL Final   • Platelets 61/54/5219 230  149 - 390 Thousands/uL Final   • nRBC 05/17/2023 0  /100 WBCs Final   • Neutrophils Relative 05/17/2023 58  43 - 75 % Final   • Immat GRANS % 05/17/2023 1  0 - 2 % Final   • Lymphocytes Relative 05/17/2023 28  14 - 44 % Final   • Monocytes Relative 05/17/2023 8  4 - 12 % Final   • Eosinophils Relative 05/17/2023 4  0 - 6 % Final   • Basophils Relative 05/17/2023 1  0 - 1 % Final   • Neutrophils Absolute 05/17/2023 3.05  1.85 - 7.62 Thousands/µL Final   • Immature Grans Absolute 05/17/2023 0.03  0.00 - 0.20 Thousand/uL Final   • Lymphocytes Absolute 05/17/2023 1.48  0.60 - 4.47 Thousands/µL Final   • Monocytes Absolute 05/17/2023 0.41  0.17 - 1.22 Thousand/µL Final   • Eosinophils Absolute 05/17/2023 0.22  0.00 - 0.61 Thousand/µL Final   • Basophils Absolute 05/17/2023 0.05  0.00 - 0.10 Thousands/µL Final   • HCG, Quant 05/17/2023 <2  <=6 mIU/mL Final   • WBC 05/18/2023 5.31  4.31 - 10.16 Thousand/uL Final   • RBC 05/18/2023 4.73  3.81 - 5.12 Million/uL Final   • Hemoglobin 05/18/2023 13.7  11.5 - 15.4 g/dL Final   • Hematocrit 05/18/2023 42.4  34.8 - 46.1 % Final   • MCV 05/18/2023 90  82 - 98 fL Final   • MCH 05/18/2023 29.0  26.8 - 34.3 pg Final   • MCHC 05/18/2023 32.3  31.4 - 37.4 g/dL Final   • RDW 05/18/2023 13.0  11.6 - 15.1 % Final   • MPV 05/18/2023 9.6  8.9 - 12.7 fL Final   • Platelets 95/07/9069 237  149 - 390 Thousands/uL Final   • nRBC 05/18/2023 0  /100 WBCs Final   • Neutrophils Relative 05/18/2023 58  43 - 75 % Final   • Immat GRANS % 05/18/2023 1  0 - 2 % Final   • Lymphocytes Relative 05/18/2023 26  14 - 44 % Final   • Monocytes Relative 05/18/2023 8  4 - 12 % Final   • Eosinophils Relative 05/18/2023 6  0 - 6 % Final   • Basophils Relative 05/18/2023 1  0 - 1 % Final   • Neutrophils Absolute 05/18/2023 3.15  1.85 - 7.62 Thousands/µL Final   • Immature Grans Absolute 05/18/2023 0.04  0.00 - 0.20 Thousand/uL Final   • Lymphocytes Absolute 05/18/2023 1.36  0.60 - 4.47 Thousands/µL Final   • Monocytes Absolute 05/18/2023 0.41  0.17 - 1.22 Thousand/µL Final   • Eosinophils Absolute 05/18/2023 0.30  0.00 - 0.61 Thousand/µL Final   • Basophils Absolute 05/18/2023 0.05  0.00 - 0.10 Thousands/µL Final   • Sodium 05/18/2023 134 (L)  135 - 147 mmol/L Final   • Potassium 05/18/2023 3.9  3.5 - 5.3 mmol/L Final   • Chloride 05/18/2023 106  96 - 108 mmol/L Final   • CO2 05/18/2023 25  21 - 32 mmol/L Final   • ANION GAP 05/18/2023 3 (L)  4 - 13 mmol/L Final   • BUN 05/18/2023 12  5 - 25 mg/dL Final   • Creatinine 05/18/2023 0.49 (L)  0.60 - 1.30 mg/dL Final    Standardized to IDMS reference method   • Glucose 05/18/2023 83  65 - 140 mg/dL Final    Specimen collection should occur prior to Sulfasalazine administration due to the potential for falsely depressed results. Specimen collection should occur prior to Sulfapyridine administration due to the potential for falsely elevated results.   If the patient is fasting, the ADA then defines impaired fasting glucose as > 100 mg/dL and diabetes as > or equal to 123 mg/dL. • Calcium 05/18/2023 8.9  8.3 - 10.1 mg/dL Final   • eGFR 05/18/2023 115  ml/min/1.73sq m Final   • COLOR 05/18/2023 Charla Feathers   Final   • Size 05/18/2023 3x3  mm Final    Single piece received. • Stone Weight 05/18/2023 9  mg Final   • Composition 05/18/2023 Comment   Final    Percentage (Represents the % composition)   • Ca Oxalate,Dihydrate 05/18/2023 20  % Final   • Ca Oxalate,Monohydr. 05/18/2023 75  % Final   • Comment 05/18/2023 Comment   Final    Calcium phosphate (hydroxyl form) includes hydroxyapatite,  amorphous calcium phosphate, and whitlockite. Hydroxyapatite  is the most common of the calcium phosphate salts found in  human kidney stones. • STONE COMMENT 05/18/2023 Comment   Final    Physician questions regarding [facility] Analysis contact  [facility] at: [phone]. • Please note 05/18/2023 Comment   Final    [facility]i report will follow via computer, mail or   delivery. • Disclaimer 05/18/2023 Comment   Final    This test was developed and its performance characteristics  determined by [facility].  It has not been cleared or approved  by the Food and Drug Administration.    • Photo 05/18/2023 Comment   Final    Photograph will follow under a separate cover   • Stone Source 05/18/2023 Comment   Final    Left Kidney   • Hydroxyapatite 05/18/2023 5  % Final   Office Visit on 05/11/2023   Component Date Value Ref Range Status   • Urine Culture 05/11/2023 No Growth <1000 cfu/mL   Final   Admission on 05/03/2023, Discharged on 05/07/2023   Component Date Value Ref Range Status   • WBC 05/03/2023 6.70  4.31 - 10.16 Thousand/uL Final   • RBC 05/03/2023 5.56 (H)  3.81 - 5.12 Million/uL Final   • Hemoglobin 05/03/2023 16.9 (H)  11.5 - 15.4 g/dL Final   • Hematocrit 05/03/2023 49.9 (H)  34.8 - 46.1 % Final   • MCV 05/03/2023 90  82 - 98 fL Final   • MCH 05/03/2023 30.4  26.8 - 34.3 pg Final   • MCHC 05/03/2023 33.9  31.4 - 37.4 g/dL Final   • RDW 05/03/2023 13.1  11.6 - 15.1 % Final   • MPV 05/03/2023 9.4  8.9 - 12.7 fL Final   • Platelets 97/45/6269 208  149 - 390 Thousands/uL Final   • nRBC 05/03/2023 0  /100 WBCs Final   • Neutrophils Relative 05/03/2023 62  43 - 75 % Final   • Immat GRANS % 05/03/2023 0  0 - 2 % Final   • Lymphocytes Relative 05/03/2023 26  14 - 44 % Final   • Monocytes Relative 05/03/2023 6  4 - 12 % Final   • Eosinophils Relative 05/03/2023 5  0 - 6 % Final   • Basophils Relative 05/03/2023 1  0 - 1 % Final   • Neutrophils Absolute 05/03/2023 4.15  1.85 - 7.62 Thousands/µL Final   • Immature Grans Absolute 05/03/2023 0.02  0.00 - 0.20 Thousand/uL Final   • Lymphocytes Absolute 05/03/2023 1.74  0.60 - 4.47 Thousands/µL Final   • Monocytes Absolute 05/03/2023 0.40  0.17 - 1.22 Thousand/µL Final   • Eosinophils Absolute 05/03/2023 0.35  0.00 - 0.61 Thousand/µL Final   • Basophils Absolute 05/03/2023 0.04  0.00 - 0.10 Thousands/µL Final   • Sodium 05/03/2023 137  135 - 147 mmol/L Final   • Potassium 05/03/2023 4.6  3.5 - 5.3 mmol/L Final   • Chloride 05/03/2023 103  96 - 108 mmol/L Final   • CO2 05/03/2023 26  21 - 32 mmol/L Final   • ANION GAP 05/03/2023 8  4 - 13 mmol/L Final   • BUN 05/03/2023 14  5 - 25 mg/dL Final   • Creatinine 05/03/2023 0.72  0.60 - 1.30 mg/dL Final    Standardized to IDMS reference method   • Glucose 05/03/2023 110  65 - 140 mg/dL Final    If the patient is fasting, the ADA then defines impaired fasting glucose as > 100 mg/dL and diabetes as > or equal to 123 mg/dL.    • Calcium 05/03/2023 9.5  8.4 - 10.2 mg/dL Final   • eGFR 05/03/2023 99  ml/min/1.73sq m Final   • Magnesium 05/03/2023 2.1  1.9 - 2.7 mg/dL Final   • Preg, Serum 05/03/2023 Negative  Negative Final   • Color, UA 05/03/2023 Yellow  Yellow, Straw Final   • Clarity, UA 05/03/2023 Slightly Cloudy (A)  Hazy, Clear Final   • Specific Gravity, UA 05/03/2023 1.020  >1.005 - <1.030 Final   • pH, UA 05/03/2023 7.0  5.0, 5.5, 6.0, 6.5, 7.0, 7.5 Final   • Leukocytes, UA 05/03/2023 1+ (A)  Negative Final   • Nitrite, UA 05/03/2023 Negative  Negative Final   • Protein, UA 05/03/2023 Negative  Negative, Interference- unable to analyze mg/dl Final   • Glucose, UA 05/03/2023 Negative  Negative mg/dl Final   • Ketones, UA 05/03/2023 40 (2+) (A)  Negative mg/dl Final   • Urobilinogen, UA 05/03/2023 0.2  0.2, 1.0 E.U./dl E.U./dl Final   • Bilirubin, UA 05/03/2023 Negative  Negative Final   • Occult Blood, UA 05/03/2023 2+ (A)  Negative Final   • RBC, UA 05/03/2023 1-2  None Seen, 0-1, 1-2, 2-4, 0-5 /hpf Final   • WBC, UA 05/03/2023 20-30 (A)  None Seen, 0-1, 1-2, 0-5, 2-4 /hpf Final   • Epithelial Cells 05/03/2023 Occasional  None Seen, Occasional /hpf Final   • Bacteria, UA 05/03/2023 Innumerable (A)  None Seen, Occasional /hpf Final   • OTHER OBSERVATIONS 05/03/2023 WBCs Clumped   Final    SEE OTHER OBSERVATIONS RESULTS!!!   • Urine Culture 05/03/2023 >100,000 cfu/ml Escherichia coli (A)   Final    This organism has been edited. The previous result was Gram Negative [facility] Enter[facility] Like on 5/5/2023 at 0826 EDT. • Sodium 05/04/2023 138  135 - 147 mmol/L Final   • Potassium 05/04/2023 3.9  3.5 - 5.3 mmol/L Final   • Chloride 05/04/2023 106  96 - 108 mmol/L Final   • CO2 05/04/2023 25  21 - 32 mmol/L Final   • ANION GAP 05/04/2023 7  4 - 13 mmol/L Final   • BUN 05/04/2023 15  5 - 25 mg/dL Final   • Creatinine 05/04/2023 0.78  0.60 - 1.30 mg/dL Final    Standardized to IDMS reference method   • Glucose 05/04/2023 97  65 - 140 mg/dL Final    If the patient is fasting, the ADA then defines impaired fasting glucose as > 100 mg/dL and diabetes as > or equal to 123 mg/dL.    • Glucose, Fasting 05/04/2023 97  65 - 99 mg/dL Final   • Calcium 05/04/2023 8.8  8.4 - 10.2 mg/dL Final   • eGFR 05/04/2023 90  ml/min/1.73sq m Final   • Sodium 05/04/2023 138  135 - 147 mmol/L Final   • Potassium 05/04/2023 3.3 (L)  3.5 - 5.3 mmol/L Final • Chloride 05/04/2023 106  96 - 108 mmol/L Final   • CO2 05/04/2023 24  21 - 32 mmol/L Final   • ANION GAP 05/04/2023 8  4 - 13 mmol/L Final   • BUN 05/04/2023 15  5 - 25 mg/dL Final   • Creatinine 05/04/2023 0.97  0.60 - 1.30 mg/dL Final    Standardized to IDMS reference method   • Glucose 05/04/2023 143 (H)  65 - 140 mg/dL Final    If the patient is fasting, the ADA then defines impaired fasting glucose as > 100 mg/dL and diabetes as > or equal to 123 mg/dL. • Glucose, Fasting 05/04/2023 143 (H)  65 - 99 mg/dL Final   • Calcium 05/04/2023 8.3 (L)  8.4 - 10.2 mg/dL Final   • eGFR 05/04/2023 69  ml/min/1.73sq m Final   • WBC 05/04/2023 5.28  4.31 - 10.16 Thousand/uL Final   • RBC 05/04/2023 4.90  3.81 - 5.12 Million/uL Final    This is an appended report. These results have been appended to a previously preliminary verified report. • Hemoglobin 05/04/2023 14.7  11.5 - 15.4 g/dL Final   • Hematocrit 05/04/2023 44.7  34.8 - 46.1 % Final   • MCV 05/04/2023 91  82 - 98 fL Final   • MCH 05/04/2023 30.0  26.8 - 34.3 pg Final   • MCHC 05/04/2023 32.9  31.4 - 37.4 g/dL Final   • RDW 05/04/2023 13.1  11.6 - 15.1 % Final   • Platelets 10/26/7530 120 (L)  149 - 390 Thousands/uL Final    Comment: Manual Review of Smear Performed                             This is an appended report. These results have been appended to a previously preliminary verified report.    • MPV 05/04/2023 9.6  8.9 - 12.7 fL Final   • Urine Culture 05/04/2023 70,000-79,000 cfu/ml Escherichia coli (A)   Final   • Color, UA 05/04/2023 Ct (A)  Yellow, Straw Final   • Clarity, UA 05/04/2023 Cloudy (A)  Hazy, Clear Final   • Specific Gravity, UA 05/04/2023 >=1.030 (H)  >1.005 - <1.030 Final   • pH, UA 05/04/2023 6.0  5.0, 5.5, 6.0, 6.5, 7.0, 7.5 Final   • Leukocytes, UA 05/04/2023 1+ (A)  Negative Final   • Nitrite, UA 05/04/2023 Positive (A)  Negative Final   • Protein, UA 05/04/2023 1+ (A)  Negative, Interference- unable to analyze mg/dl Final   • Glucose, UA 05/04/2023 Negative  Negative mg/dl Final   • Ketones, UA 05/04/2023 Trace (A)  Negative mg/dl Final   • Urobilinogen, UA 05/04/2023 0.2  0.2, 1.0 E.U./dl E.U./dl Final   • Bilirubin, UA 05/04/2023 Negative  Negative Final   • Occult Blood, UA 05/04/2023 3+ (A)  Negative Final   • Urine Culture 05/04/2023 <10,000 cfu/ml Escherichia coli (A)   Final   • Color, UA 05/04/2023 Ct (A)  Yellow, Straw Final   • Clarity, UA 05/04/2023 Cloudy (A)  Hazy, Clear Final   • Specific Gravity, UA 05/04/2023 1.020  >1.005 - <1.030 Final   • pH, UA 05/04/2023 5.5  5.0, 5.5, 6.0, 6.5, 7.0, 7.5 Final   • Leukocytes, UA 05/04/2023 2+ (A)  Negative Final   • Nitrite, UA 05/04/2023 Negative  Negative Final   • Protein, UA 05/04/2023 2+ (A)  Negative, Interference- unable to analyze mg/dl Final   • Glucose, UA 05/04/2023 Trace (A)  Negative mg/dl Final   • Ketones, UA 05/04/2023 Trace (A)  Negative mg/dl Final   • Urobilinogen, UA 05/04/2023 0.2  0.2, 1.0 E.U./dl E.U./dl Final   • Bilirubin, UA 05/04/2023 2+ (A)  Negative Final   • Occult Blood, UA 05/04/2023 3+ (A)  Negative Final   • Urine Culture 05/04/2023 50,000-59,000 cfu/ml Escherichia coli (A)   Final   • Color, UA 05/04/2023 Ct (A)  Yellow, Straw Final   • Clarity, UA 05/04/2023 Slightly Cloudy (A)  Hazy, Clear Final   • Specific Gravity, UA 05/04/2023 1.010  >1.005 - <1.030 Final   • pH, UA 05/04/2023 5.5  5.0, 5.5, 6.0, 6.5, 7.0, 7.5 Final   • Leukocytes, UA 05/04/2023 Negative  Negative Final   • Nitrite, UA 05/04/2023 Negative  Negative Final   • Protein, UA 05/04/2023 1+ (A)  Negative, Interference- unable to analyze mg/dl Final   • Glucose, UA 05/04/2023 Negative  Negative mg/dl Final   • Ketones, UA 05/04/2023 Trace (A)  Negative mg/dl Final   • Urobilinogen, UA 05/04/2023 0.2  0.2, 1.0 E.U./dl E.U./dl Final   • Bilirubin, UA 05/04/2023 Negative  Negative Final   • Occult Blood, UA 05/04/2023 3+ (A)  Negative Final   • RBC, UA 05/04/2023 10-20 (A)  None Seen, 0-1, 1-2, 2-4, 0-5 /hpf Final   • WBC, UA 05/04/2023 30-50 (A)  None Seen, 0-1, 1-2, 0-5, 2-4 /hpf Final   • Epithelial Cells 05/04/2023 Occasional  None Seen, Occasional /hpf Final   • Bacteria, UA 05/04/2023 Moderate (A)  None Seen, Occasional /hpf Final   • MUCUS THREADS 05/04/2023 Moderate (A)  None Seen Final   • RBC, UA 05/04/2023 10-20 (A)  None Seen, 0-1, 1-2, 2-4, 0-5 /hpf Final   • WBC, UA 05/04/2023 0-1  None Seen, 0-1, 1-2, 0-5, 2-4 /hpf Final   • Epithelial Cells 05/04/2023 Occasional  None Seen, Occasional /hpf Final   • Bacteria, UA 05/04/2023 None Seen  None Seen, Occasional /hpf Final   • MUCUS THREADS 05/04/2023 Occasional (A)  None Seen Final   • RBC, UA 05/04/2023 10-20 (A)  None Seen, 0-1, 1-2, 2-4, 0-5 /hpf Final   • WBC, UA 05/04/2023 20-30 (A)  None Seen, 0-1, 1-2, 0-5, 2-4 /hpf Final   • Epithelial Cells 05/04/2023 Occasional  None Seen, Occasional /hpf Final   • Bacteria, UA 05/04/2023 Occasional  None Seen, Occasional /hpf Final   • MUCUS THREADS 05/04/2023 Occasional (A)  None Seen Final   • Sodium 05/05/2023 138  135 - 147 mmol/L Final   • Potassium 05/05/2023 4.0  3.5 - 5.3 mmol/L Final   • Chloride 05/05/2023 106  96 - 108 mmol/L Final   • CO2 05/05/2023 23  21 - 32 mmol/L Final   • ANION GAP 05/05/2023 9  4 - 13 mmol/L Final   • BUN 05/05/2023 24  5 - 25 mg/dL Final   • Creatinine 05/05/2023 1.06  0.60 - 1.30 mg/dL Final    Standardized to IDMS reference method   • Glucose 05/05/2023 79  65 - 140 mg/dL Final    If the patient is fasting, the ADA then defines impaired fasting glucose as > 100 mg/dL and diabetes as > or equal to 123 mg/dL.    • Calcium 05/05/2023 8.4  8.4 - 10.2 mg/dL Final   • eGFR 05/05/2023 62  ml/min/1.73sq m Final   • WBC 05/07/2023 5.37  4.31 - 10.16 Thousand/uL Final   • RBC 05/07/2023 4.29  3.81 - 5.12 Million/uL Final   • Hemoglobin 05/07/2023 12.8  11.5 - 15.4 g/dL Final   • Hematocrit 05/07/2023 39.3  34.8 - 46.1 % Final   • MCV 05/07/2023 92  82 - 98 fL Final   • MCH 05/07/2023 29.8  26.8 - 34.3 pg Final   • MCHC 05/07/2023 32.6  31.4 - 37.4 g/dL Final   • RDW 05/07/2023 13.3  11.6 - 15.1 % Final   • MPV 05/07/2023 11.2  8.9 - 12.7 fL Final   • Platelets 23/56/0588 102 (L)  149 - 390 Thousands/uL Final   • nRBC 05/07/2023 0  /100 WBCs Final   • Neutrophils Relative 05/07/2023 74  43 - 75 % Final   • Immat GRANS % 05/07/2023 1  0 - 2 % Final   • Lymphocytes Relative 05/07/2023 13 (L)  14 - 44 % Final   • Monocytes Relative 05/07/2023 8  4 - 12 % Final   • Eosinophils Relative 05/07/2023 3  0 - 6 % Final   • Basophils Relative 05/07/2023 1  0 - 1 % Final   • Neutrophils Absolute 05/07/2023 4.03  1.85 - 7.62 Thousands/µL Final   • Immature Grans Absolute 05/07/2023 0.04  0.00 - 0.20 Thousand/uL Final   • Lymphocytes Absolute 05/07/2023 0.68  0.60 - 4.47 Thousands/µL Final   • Monocytes Absolute 05/07/2023 0.41  0.17 - 1.22 Thousand/µL Final   • Eosinophils Absolute 05/07/2023 0.18  0.00 - 0.61 Thousand/µL Final   • Basophils Absolute 05/07/2023 0.03  0.00 - 0.10 Thousands/µL Final   • Sodium 05/07/2023 141  135 - 147 mmol/L Final   • Potassium 05/07/2023 3.9  3.5 - 5.3 mmol/L Final   • Chloride 05/07/2023 106  96 - 108 mmol/L Final   • CO2 05/07/2023 30  21 - 32 mmol/L Final   • ANION GAP 05/07/2023 5  4 - 13 mmol/L Final   • BUN 05/07/2023 21  5 - 25 mg/dL Final   • Creatinine 05/07/2023 0.54 (L)  0.60 - 1.30 mg/dL Final    Standardized to IDMS reference method   • Glucose 05/07/2023 115  65 - 140 mg/dL Final    If the patient is fasting, the ADA then defines impaired fasting glucose as > 100 mg/dL and diabetes as > or equal to 123 mg/dL.    • Calcium 05/07/2023 8.5  8.4 - 10.2 mg/dL Final   • eGFR 05/07/2023 111  ml/min/1.73sq m Final   Appointment on 12/07/2022   Component Date Value Ref Range Status   • Zinc 12/07/2022 60  44 - 115 ug/dL Final                                    Detection Limit = 5   • Vitamin B1, Whole Blood 12/07/2022 137.5  66.5 - 200.0 nmol/L Final   • Vitamin A 12/07/2022 34.7  20.1 - 62.0 ug/dL Final    Reference intervals for vitamin A determined from [facility] internal  studies. Individuals with vitamin A less than 20 ug/dL are considered  vitamin A deficient and those with serum concentrations less than  10 ug/dL are considered severely deficient. This test was developed and its performance characteristics  determined by [facility]. It has not been cleared or approved  by the Food and Drug Administration.    • PTH 12/07/2022 77.3  18.4 - 80.1 pg/mL Final   Hospital Outpatient Visit on 11/09/2022   Component Date Value Ref Range Status   • WBC 11/09/2022 3.53 (L)  4.31 - 10.16 Thousand/uL Final   • RBC 11/09/2022 5.04  3.81 - 5.12 Million/uL Final   • Hemoglobin 11/09/2022 15.5 (H)  11.5 - 15.4 g/dL Final   • Hematocrit 11/09/2022 46.7 (H)  34.8 - 46.1 % Final   • MCV 11/09/2022 93  82 - 98 fL Final   • MCH 11/09/2022 30.8  26.8 - 34.3 pg Final   • MCHC 11/09/2022 33.2  31.4 - 37.4 g/dL Final   • RDW 11/09/2022 13.2  11.6 - 15.1 % Final   • MPV 11/09/2022 9.5  8.9 - 12.7 fL Final   • Platelets 87/09/8446 143 (L)  149 - 390 Thousands/uL Final   • nRBC 11/09/2022 0  /100 WBCs Final   • Neutrophils Relative 11/09/2022 59  43 - 75 % Final   • Immat GRANS % 11/09/2022 1  0 - 2 % Final   • Lymphocytes Relative 11/09/2022 24  14 - 44 % Final   • Monocytes Relative 11/09/2022 11  4 - 12 % Final   • Eosinophils Relative 11/09/2022 4  0 - 6 % Final   • Basophils Relative 11/09/2022 1  0 - 1 % Final   • Neutrophils Absolute 11/09/2022 2.09  1.85 - 7.62 Thousands/µL Final   • Immature Grans Absolute 11/09/2022 0.04  0.00 - 0.20 Thousand/uL Final   • Lymphocytes Absolute 11/09/2022 0.86  0.60 - 4.47 Thousands/µL Final   • Monocytes Absolute 11/09/2022 0.38  0.17 - 1.22 Thousand/µL Final   • Eosinophils Absolute 11/09/2022 0.13  0.00 - 0.61 Thousand/µL Final   • Basophils Absolute 11/09/2022 0.03  0.00 - 0.10 Thousands/µL Final   • Sodium 11/09/2022 139  135 - 147 mmol/L Final   • Potassium 11/09/2022 3.6  3.5 - 5.3 mmol/L Final   • Chloride 11/09/2022 104  96 - 108 mmol/L Final   • CO2 11/09/2022 29  21 - 32 mmol/L Final   • ANION GAP 11/09/2022 6  4 - 13 mmol/L Final   • BUN 11/09/2022 19  5 - 25 mg/dL Final   • Creatinine 11/09/2022 0.58 (L)  0.60 - 1.30 mg/dL Final    Standardized to IDMS reference method   • Glucose 11/09/2022 88  65 - 140 mg/dL Final    If the patient is fasting, the ADA then defines impaired fasting glucose as > 100 mg/dL and diabetes as > or equal to 123 mg/dL. Specimen collection should occur prior to Sulfasalazine administration due to the potential for falsely depressed results. Specimen collection should occur prior to Sulfapyridine administration due to the potential for falsely elevated results. • Calcium 11/09/2022 8.9  8.4 - 10.2 mg/dL Final   • AST 11/09/2022 20  13 - 39 U/L Final    Specimen collection should occur prior to Sulfasalazine administration due to the potential for falsely depressed results. • ALT 11/09/2022 20  7 - 52 U/L Final    Specimen collection should occur prior to Sulfasalazine administration due to the potential for falsely depressed results. • Alkaline Phosphatase 11/09/2022 53  34 - 104 U/L Final   • Total Protein 11/09/2022 6.5  6.4 - 8.4 g/dL Final   • Albumin 11/09/2022 4.0  3.5 - 5.0 g/dL Final   • Total Bilirubin 11/09/2022 0.43  0.20 - 1.00 mg/dL Final   • eGFR 11/09/2022 109  ml/min/1.73sq m Final   • CRP 11/09/2022 1.1  <3.0 mg/L Final   • Sed Rate 11/09/2022 2  0 - 19 mm/hour Final   • Vitamin B-12 11/09/2022 300  100 - 900 pg/mL Final   • Folate 11/09/2022 8.1  3.1 - 17.5 ng/mL Final    Slightly Hemolyzed; Results May be Affected   • Iron Saturation 11/09/2022 30  15 - 50 % Final   • TIBC 11/09/2022 270  250 - 450 ug/dL Final   • Iron 11/09/2022 80  50 - 170 ug/dL Final    Patients treated with metal-binding drugs (ie. Deferoxamine) may have depressed iron values.    • Ferritin 11/09/2022 123  8 - 388 ng/mL Final

## 2023-11-10 LAB — METHYLMALONATE SERPL-SCNC: 231 NMOL/L (ref 0–378)

## 2023-11-13 ENCOUNTER — TELEPHONE (OUTPATIENT)
Dept: BARIATRICS | Facility: CLINIC | Age: 48
End: 2023-11-13

## 2023-11-13 DIAGNOSIS — K21.9 GERD (GASTROESOPHAGEAL REFLUX DISEASE): ICD-10-CM

## 2023-11-13 DIAGNOSIS — Z98.84 BARIATRIC SURGERY STATUS: Primary | ICD-10-CM

## 2023-11-13 RX ORDER — DEXLANSOPRAZOLE 30 MG/1
30 CAPSULE, DELAYED RELEASE ORAL 2 TIMES DAILY
Qty: 180 CAPSULE | Refills: 3 | Status: SHIPPED | OUTPATIENT
Start: 2023-11-13 | End: 2023-11-15 | Stop reason: CLARIF

## 2023-11-13 NOTE — TELEPHONE ENCOUNTER
Patient reports since starting nexium her GERD symptoms has not been relieved but she is also have associated dizziness and lightheadedness. Having reflux with regurgitation. Sleeping on recliner. Will stop nexium for now. Will send in prior auth to see if Clementine Rosas will be covered.

## 2023-11-13 NOTE — TELEPHONE ENCOUNTER
Pt called office stating she is currently taking Nexium for 2 to 3 weeks not and has been feeling light headed and dizzy since starting the med, and is nt helping with her prior symptoms, Spoke with provider she advised for pt to stop taking the med and go back to her Omeprazole.

## 2023-11-14 ENCOUNTER — TELEPHONE (OUTPATIENT)
Dept: BARIATRICS | Facility: CLINIC | Age: 48
End: 2023-11-14

## 2023-11-14 NOTE — TELEPHONE ENCOUNTER
Called pt to let her know her dexilant was approved. Can try to start out with once a day first and if not effective, increase to twice a day as prescribed. Discussed the goal is to come off pepcid and tums. Trial and error medications and she will update us on her symptoms.

## 2023-11-15 DIAGNOSIS — K21.9 GERD (GASTROESOPHAGEAL REFLUX DISEASE): ICD-10-CM

## 2023-11-15 DIAGNOSIS — Z98.84 BARIATRIC SURGERY STATUS: Primary | ICD-10-CM

## 2023-11-15 RX ORDER — DEXLANSOPRAZOLE 60 MG/1
60 CAPSULE, DELAYED RELEASE ORAL DAILY
Qty: 90 CAPSULE | Refills: 3 | Status: SHIPPED | OUTPATIENT
Start: 2023-11-15

## 2023-11-16 ENCOUNTER — APPOINTMENT (OUTPATIENT)
Dept: RADIOLOGY | Facility: HOSPITAL | Age: 48
End: 2023-11-16
Payer: MEDICARE

## 2023-11-16 ENCOUNTER — ANESTHESIA (OUTPATIENT)
Dept: PERIOP | Facility: HOSPITAL | Age: 48
End: 2023-11-16
Payer: MEDICARE

## 2023-11-16 ENCOUNTER — HOSPITAL ENCOUNTER (OUTPATIENT)
Facility: HOSPITAL | Age: 48
Setting detail: OUTPATIENT SURGERY
Discharge: HOME | End: 2023-11-16
Attending: STUDENT IN AN ORGANIZED HEALTH CARE EDUCATION/TRAINING PROGRAM | Admitting: STUDENT IN AN ORGANIZED HEALTH CARE EDUCATION/TRAINING PROGRAM
Payer: MEDICARE

## 2023-11-16 VITALS
BODY MASS INDEX: 26.22 KG/M2 | RESPIRATION RATE: 16 BRPM | OXYGEN SATURATION: 99 % | WEIGHT: 148 LBS | DIASTOLIC BLOOD PRESSURE: 58 MMHG | SYSTOLIC BLOOD PRESSURE: 123 MMHG | HEART RATE: 59 BPM | HEIGHT: 63 IN | TEMPERATURE: 97.3 F

## 2023-11-16 DIAGNOSIS — Z98.890 STATUS POST SURGERY: Primary | ICD-10-CM

## 2023-11-16 PROCEDURE — 73630 X-RAY EXAM OF FOOT: CPT

## 2023-11-16 PROCEDURE — 28124 PARTIAL REMOVAL OF TOE: CPT | Performed by: STUDENT IN AN ORGANIZED HEALTH CARE EDUCATION/TRAINING PROGRAM

## 2023-11-16 PROCEDURE — 99024 POSTOP FOLLOW-UP VISIT: CPT | Performed by: STUDENT IN AN ORGANIZED HEALTH CARE EDUCATION/TRAINING PROGRAM

## 2023-11-16 RX ORDER — MAGNESIUM HYDROXIDE 1200 MG/15ML
LIQUID ORAL AS NEEDED
Status: DISCONTINUED | OUTPATIENT
Start: 2023-11-16 | End: 2023-11-16 | Stop reason: HOSPADM

## 2023-11-16 RX ORDER — CEFAZOLIN SODIUM 2 G/50ML
2000 SOLUTION INTRAVENOUS ONCE
Status: COMPLETED | OUTPATIENT
Start: 2023-11-16 | End: 2023-11-16

## 2023-11-16 RX ORDER — MIDAZOLAM HYDROCHLORIDE 2 MG/2ML
INJECTION, SOLUTION INTRAMUSCULAR; INTRAVENOUS AS NEEDED
Status: DISCONTINUED | OUTPATIENT
Start: 2023-11-16 | End: 2023-11-16

## 2023-11-16 RX ORDER — LIDOCAINE HYDROCHLORIDE 10 MG/ML
INJECTION, SOLUTION EPIDURAL; INFILTRATION; INTRACAUDAL; PERINEURAL AS NEEDED
Status: DISCONTINUED | OUTPATIENT
Start: 2023-11-16 | End: 2023-11-16 | Stop reason: HOSPADM

## 2023-11-16 RX ORDER — EPHEDRINE SULFATE 50 MG/ML
INJECTION INTRAVENOUS AS NEEDED
Status: DISCONTINUED | OUTPATIENT
Start: 2023-11-16 | End: 2023-11-16

## 2023-11-16 RX ORDER — CHLORHEXIDINE GLUCONATE ORAL RINSE 1.2 MG/ML
15 SOLUTION DENTAL ONCE
Status: COMPLETED | OUTPATIENT
Start: 2023-11-16 | End: 2023-11-16

## 2023-11-16 RX ORDER — ONDANSETRON 2 MG/ML
4 INJECTION INTRAMUSCULAR; INTRAVENOUS ONCE AS NEEDED
Status: DISCONTINUED | OUTPATIENT
Start: 2023-11-16 | End: 2023-11-16 | Stop reason: HOSPADM

## 2023-11-16 RX ORDER — ONDANSETRON 2 MG/ML
INJECTION INTRAMUSCULAR; INTRAVENOUS AS NEEDED
Status: DISCONTINUED | OUTPATIENT
Start: 2023-11-16 | End: 2023-11-16

## 2023-11-16 RX ORDER — FENTANYL CITRATE 50 UG/ML
INJECTION, SOLUTION INTRAMUSCULAR; INTRAVENOUS AS NEEDED
Status: DISCONTINUED | OUTPATIENT
Start: 2023-11-16 | End: 2023-11-16

## 2023-11-16 RX ORDER — FENTANYL CITRATE/PF 50 MCG/ML
25 SYRINGE (ML) INJECTION
Status: DISCONTINUED | OUTPATIENT
Start: 2023-11-16 | End: 2023-11-16 | Stop reason: HOSPADM

## 2023-11-16 RX ORDER — PROPOFOL 10 MG/ML
INJECTION, EMULSION INTRAVENOUS CONTINUOUS PRN
Status: DISCONTINUED | OUTPATIENT
Start: 2023-11-16 | End: 2023-11-16

## 2023-11-16 RX ORDER — PROPOFOL 10 MG/ML
INJECTION, EMULSION INTRAVENOUS AS NEEDED
Status: DISCONTINUED | OUTPATIENT
Start: 2023-11-16 | End: 2023-11-16

## 2023-11-16 RX ORDER — BUPIVACAINE HYDROCHLORIDE 2.5 MG/ML
INJECTION, SOLUTION EPIDURAL; INFILTRATION; INTRACAUDAL AS NEEDED
Status: DISCONTINUED | OUTPATIENT
Start: 2023-11-16 | End: 2023-11-16 | Stop reason: HOSPADM

## 2023-11-16 RX ORDER — OXYCODONE HYDROCHLORIDE AND ACETAMINOPHEN 5; 325 MG/1; MG/1
1 TABLET ORAL EVERY 4 HOURS PRN
Qty: 15 TABLET | Refills: 0 | Status: SHIPPED | OUTPATIENT
Start: 2023-11-16 | End: 2023-11-27

## 2023-11-16 RX ORDER — SODIUM CHLORIDE, SODIUM LACTATE, POTASSIUM CHLORIDE, CALCIUM CHLORIDE 600; 310; 30; 20 MG/100ML; MG/100ML; MG/100ML; MG/100ML
INJECTION, SOLUTION INTRAVENOUS CONTINUOUS PRN
Status: DISCONTINUED | OUTPATIENT
Start: 2023-11-16 | End: 2023-11-16

## 2023-11-16 RX ADMIN — EPHEDRINE SULFATE 5 MG: 50 INJECTION, SOLUTION INTRAVENOUS at 12:28

## 2023-11-16 RX ADMIN — EPHEDRINE SULFATE 5 MG: 50 INJECTION, SOLUTION INTRAVENOUS at 12:22

## 2023-11-16 RX ADMIN — FENTANYL CITRATE 25 MCG: 50 INJECTION, SOLUTION INTRAMUSCULAR; INTRAVENOUS at 12:09

## 2023-11-16 RX ADMIN — Medication 4 MCG: at 12:00

## 2023-11-16 RX ADMIN — ONDANSETRON 4 MG: 2 INJECTION INTRAMUSCULAR; INTRAVENOUS at 12:17

## 2023-11-16 RX ADMIN — PROPOFOL 50 MG: 10 INJECTION, EMULSION INTRAVENOUS at 12:04

## 2023-11-16 RX ADMIN — MIDAZOLAM HYDROCHLORIDE 2 MG: 1 INJECTION, SOLUTION INTRAMUSCULAR; INTRAVENOUS at 12:00

## 2023-11-16 RX ADMIN — CHLORHEXIDINE GLUCONATE 15 ML: 1.2 RINSE ORAL at 11:32

## 2023-11-16 RX ADMIN — PROPOFOL 100 MCG/KG/MIN: 10 INJECTION, EMULSION INTRAVENOUS at 12:04

## 2023-11-16 RX ADMIN — SODIUM CHLORIDE, SODIUM LACTATE, POTASSIUM CHLORIDE, AND CALCIUM CHLORIDE: .6; .31; .03; .02 INJECTION, SOLUTION INTRAVENOUS at 12:00

## 2023-11-16 RX ADMIN — CEFAZOLIN SODIUM 2000 MG: 2 SOLUTION INTRAVENOUS at 12:03

## 2023-11-16 NOTE — DISCHARGE INSTR - AVS FIRST PAGE
Dr. Slime Bradley surgery Instructions    Pain / Swelling  There is expected to be some discomfort, swelling and bruising of the foot. You might see some blood on the bandage. This is not a cause for alarm. However, if there is active or persistent bleeding (blood running out of the bandage while at rest) - call the office at once (or) go to a [facility] ER and ask them to page the podiatry residents. Apply an ice bag to the top of your ankle for 30 minutes for each waking hour, for the first 72 hours. This should be discontinued when sleeping. This will also work through your cast if you have one. Ice must not leak and wet the dressings. Also, using the ice inappropriately can cause permanent nerve damage. Your foot should be elevated as much as possible for the first 72 hours. The foot should be above heart level. If your foot is below heart level, throbbing and pain will increase. When sleeping, elevation can be accomplished by putting a small hard suitcase between the box spring and mattress at the foot of the bed. Walking and standing will increase pain, throbbing and bleeding. Persistent pain despite elevation and your pain meds can many times be relieved by removing the tight brown compression layer (called the ACE wrap) that is over the white gauze dressing. If you are elevating and taking your pain meds and pain is still severe, remove this brown stretchy layer but leave the gauze intact. Wait 30 minutes. If the pain subsides, reapply the ACE so it’s not so tight. If pain doesn’t get better, call your doctor. Dressings / Casts  Do not remove your surgical dressings - they will be changed at your doctor appointment. Do not allow surgical dressings to get wet. Sponge baths should be used until the sutures are removed. Do not try to keep the foot dry using a garbage bag and tape - this rarely works. If you get your dressings or cast wet - call your doctor immediately.   If your cast or dressings feel tight - elevate your foot for 30 minutes. If this doesn’t help and you feel tingling or see toe discoloration - call your doctor or go to a [facility] ER and ask them to page the podiatry residents. Do not put things in your cast such as powder, coat hangers to scratch, etc.. This can cause skin damage and infections. Infection  If you have a fever at or above 100 degrees, chills, sweats, or see red streaks rising above the dressing or smell odor / see pus (creamy white drainage), call your doctor immediately or go to a [facility] ER and ask them to page the podiatry residents. Constipation  If you have severe constipation after surgery, this can be due to the pain medication. Notify your doctor and special medication will be prescribed to deal with this. Blood Clots  If you had surgery and are in a cast, have an external fixation device, or are non-weightbearing using crutches, a knee scooter, a wheelchair, a walker, or an iWalk device - you need to be on a blood thinner. Your doctor will prescribe one of the regimens below. If you run out of the blood thinner checked off below before you are walking normally on your foot and out of your cast - notify your doctor immediately so you can get a refill. Not doing so can lead to blood clots and serious complications including death. Numbness  It is normal for your foot to be numb until about dinner time. If you’ve had a popliteal block procedure, you might be numb until the following day. When you start to feel pins and needles in the foot - this means the block is wearing off. That is the appropriate time to take your pain medication. Pain Medication  Do not supplement your pain medication with over the counter drugs, old leftover pain medications, or extra Tylenol. You must discuss any additional medications with your doctor prior to taking them for pain.    Driving  No driving is allowed without discussion with the doctor  Ambulation  Weight bear as tolerated to surgical foot  If given a flat, stiff shoe / darco wedge shoe, Do not walk at all without it. Use a device (cane, walker, crutches) to take some weight off of the foot when walking  If instructed not to put weight on the surgical foot, use the following:     Putting weight on the foot will lead to complications.

## 2023-11-16 NOTE — ANESTHESIA PREPROCEDURE EVALUATION
Procedure:  EXCISION EXOSTOSIS 5TH TOE (Right: Foot)    Relevant Problems   ANESTHESIA   (+) PONV (postoperative nausea and vomiting)      CARDIO   (+) Hypercholesteremia   (+) Migraine with aura and without status migrainosus, not intractable   (+) Murmur, cardiac      GI/HEPATIC   (+) Gastroesophageal reflux disease      /RENAL   (+) Obstruction of left ureteropelvic junction (UPJ) due to stone   (+) Renal calculi      HEMATOLOGY   (+) Iron deficiency anemia secondary to inadequate dietary iron intake      NEURO/PSYCH   (+) Chronic tension-type headache, intractable   (+) Migraine with aura and without status migrainosus, not intractable   (+) PTSD (post-traumatic stress disorder)        Allergies noted  Blood product refusal noted  PONV  Pseudotumor Cerebri s/p shunt        Physical Exam    Airway    Mallampati score: II  TM Distance: >3 FB  Neck ROM: full     Dental   No notable dental hx     Cardiovascular  Cardiovascular exam normal    Pulmonary  Pulmonary exam normal     Other Findings  post-pubertal.      Anesthesia Plan  ASA Score- 2     Anesthesia Type- IV sedation with anesthesia with ASA Monitors. Additional Monitors:     Airway Plan:     Comment: + local block by surgeon. Plan Factors-    Chart reviewed. EKG reviewed. Existing labs reviewed. Patient summary reviewed. Induction- intravenous. Postoperative Plan-     Informed Consent- Anesthetic plan and risks discussed with patient. I personally reviewed this patient with the CRNA. Discussed and agreed on the Anesthesia Plan with the CRNA. Mitchel Cheatham

## 2023-11-16 NOTE — ANESTHESIA POSTPROCEDURE EVALUATION
Post-Op Assessment Note    CV Status:  Stable  Pain Score: 0    Pain management: adequate       Mental Status:  Alert and awake   Hydration Status:  Euvolemic   PONV Controlled:  Controlled   Airway Patency:  Patent     Post Op Vitals Reviewed: Yes    No anethesia notable event occurred.     Staff: CRNA               BP   122/56   Temp  97   Pulse 60   Resp 14   SpO2 99

## 2023-11-16 NOTE — DISCHARGE SUMMARY
Discharge Summary Outpatient Procedure Podiatry -   Evans Fredy 51 y.o. female MRN: [id number]  Unit/Bed#: OR POOL Encounter: [id number]    Admission Date: 11/16/2023     Admitting Diagnosis: Exostosis [M89.8X9]  Pain of fifth toe [M79.676]    Discharge Diagnosis: same    Procedures Performed: EXCISION EXOSTOSIS 5TH TOE RIght:      Complications:     Condition at Discharge: stable    Discharge instructions/Information to patient and family:   See after visit summary for information provided to patient and family. Provisions for Follow-Up Care/Important appointments:  See after visit summary for information related to follow-up care and any pertinent home health orders. Discharge Medications:  See after visit summary for reconciled discharge medications provided to patient and family.

## 2023-11-16 NOTE — OP NOTE
OPERATIVE REPORT - Podiatry  PATIENT NAME: Galina John    :  1975  MRN: [id number]  Pt Location: [facility] OR ROOM 02    SURGERY DATE: 2023    Surgeon(s) and Role:     * Glenys Whitney DPM - Primary    Pre-op Diagnosis:  Exostosis [M89.8X9]  Pain of fifth toe [M79.676]    Post-Op Diagnosis Codes:     * Exostosis [M89.8X9]     * Pain of fifth toe [M79.676]    Procedure(s) (LRB):  EXCISION EXOSTOSIS 5TH TOE RIght (Right)    Specimen(s):  * No specimens in log *    Estimated Blood Loss:   Minimal    Drains:  * No LDAs found *    Anesthesia Type:   Choice with 5 ml of 1% Lidocaine and 0.25% Bupivacaine in a 1:1 mixture    Hemostasis:  Right ankle pneumatic tourniquet at 250 mmHg for 22 minutes     Materials:  * No implants in log *  3-0 nylon    Operative Findings:  Consistent with the diagnosis. Complications:   None    Procedure and Technique:     Under mild sedation, the patient was brought into the operating room and placed on the operating room table in the supine position. IV sedation was achieved by anesthesia team and a universal timeout was performed where all parties are in agreement of correct patient, correct procedure and correct site. A pneumatic tourniquet was then placed over the patient's right lower extremity with ample padding. A lateral forefoot block was performed consisting of 5 ml of 1% Lidocaine and 0.25% Bupivacaine in a 1:1 mixture. The foot was then prepped and draped in the usual aseptic manner. An esmarch bandage was used to exsangunate the foot and the pneumatic tourniquet was then inflated to 250mmHg. Attention was directed to the right lateral fifth digit first the toenail was entirely removed using freer elevator and hemostat. Next utilizing a #15 blade a skin incision was made down to the subcutaneous tissue. The incision was then further deepened with sharp and blunt dissection. are was taken to identify retract all the vital neurovascular structures.   Incision was then carried down to the level of bone and soft tissue attachment to the distal phalanx was resected. A dorsal lateral spur was located which was removed utilizing a combination of rongeur and a bur. At this time no further no further lateral spur noted. The incision was then rinsed with copious amount of normal sterile saline. The incision was then reapproximated utilizing a 3-0 nylon horizontal mattress fashion. The incision was then dressed with Adaptic, 4 x 4 gauze Maureen Coban and Ace wrap. The tourniquet was deflated at approximately 22 min and normal hyperemic response was noted to all digits. The patient tolerated the procedure and anesthesia well without immediate complications and transferred to PACU with vital signs stable. I was present for the entire procedure. Bob Szymanski DPM  DATE: November 16, 2023  TIME: 5:35 PM      Portions of the record may have been created with voice recognition software. Occasional wrong word or "sound a like" substitutions may have occurred due to the inherent limitations of voice recognition software. Read the chart carefully and recognize, using context, where substitutions have occurred.

## 2023-11-17 ENCOUNTER — TELEPHONE (OUTPATIENT)
Age: 48
End: 2023-11-17

## 2023-11-17 DIAGNOSIS — M79.676 PAIN OF FIFTH TOE: ICD-10-CM

## 2023-11-17 DIAGNOSIS — M89.8X9 EXOSTOSIS: Primary | ICD-10-CM

## 2023-11-17 RX ORDER — NALOXONE HYDROCHLORIDE 4 MG/.1ML
SPRAY NASAL
Qty: 1 EACH | Refills: 1 | Status: SHIPPED | OUTPATIENT
Start: 2023-11-17 | End: 2024-11-16

## 2023-11-17 RX ORDER — HYDROCODONE BITARTRATE AND ACETAMINOPHEN 5; 325 MG/1; MG/1
1 TABLET ORAL EVERY 6 HOURS PRN
Qty: 15 TABLET | Refills: 0 | Status: SHIPPED | OUTPATIENT
Start: 2023-11-17

## 2023-11-17 NOTE — TELEPHONE ENCOUNTER
Caller: Patient    Doctor: Buffy Burt    Reason for call: Patient says she is in a great deal of pain. The prescribed  medicine is not working. Can we phone in a different prescription for her? Thank you.     Call back#: [phone]

## 2023-11-27 ENCOUNTER — OFFICE VISIT (OUTPATIENT)
Dept: PODIATRY | Facility: CLINIC | Age: 48
End: 2023-11-27

## 2023-11-27 VITALS
SYSTOLIC BLOOD PRESSURE: 126 MMHG | WEIGHT: 147.78 LBS | DIASTOLIC BLOOD PRESSURE: 84 MMHG | BODY MASS INDEX: 26.18 KG/M2 | HEIGHT: 63 IN | HEART RATE: 65 BPM

## 2023-11-27 DIAGNOSIS — M89.8X9 EXOSTOSIS: Primary | ICD-10-CM

## 2023-11-27 DIAGNOSIS — Z98.890 STATUS POST RIGHT FOOT SURGERY: ICD-10-CM

## 2023-11-27 PROCEDURE — 99024 POSTOP FOLLOW-UP VISIT: CPT | Performed by: STUDENT IN AN ORGANIZED HEALTH CARE EDUCATION/TRAINING PROGRAM

## 2023-11-27 NOTE — PROGRESS NOTES
PATIENT:  Holland Felton      1975    ASSESSMENT     1. Exostosis        2. Status post right foot surgery               PLAN  Patient is doing well post-operatively. Sutures left intact. Incision was cleaned with betadine and DSD applied to be kept C/D/I. Continue post-op care as instructed. Stressed on patient compliance about proper off-loading, staying off of feet, and proper dressing care. Call if any increase in pain, fevers, calf pain, shortness of breath, or general distress is noted. Patient instructed to go to ER if call is not returned immediately. HISTORY OF PRESENT ILLNESS  Patient presents for post-op appointment s/p right fifth toe exostosis excision date of surgery 11/16/2023. Patient reports she was not able to tolerate pain medication therefore she is not taking. She is complaining of discomfort to her toe. The patient is feeling well and in good spirits. Patient reported no post-op concern. Patient relates compliance with surgical shoe, elevation, and keeping dressing clean, dry and intact. REVIEW OF SYSTEMS  GENERAL: No fever or chills. HEART: No chest pain, or palpitation  RESPIRATORY:  No SOB or cough  GI: No Nausea, vomit or diarrhea  NEUROLOGIC: No syncope or acute weakness  MUSCULOSKELETAL: No calf pain or edema. PHYSICAL EXAMINATION  GENERAL  The patient appears in NAD / non-toxic. Afebrile. VSS mild tenderness to touch noted to the right fifth digit. VASCULAR EXAM  Pedal pulses and vascular status are intact. No calf pain or edema bilaterally. No cyanosis. DERMATOLOGIC EXAM  Incision is coapted and healing well. No signs of infection. No active drainage. Normal post-op edema and ecchymosis. No necrosis or dehiscence. Capillary refill time within normal limits. NEUROLOGIC EXAM  AAO X 3. No focal neurologic deficit. Neurologic status is intact BLE. Sensation to right foot and fifth digit intact and within normal limits.     MUSCULOSKELETAL EXAM  Good surgical correction. Normal post-op findings. ROM intact. No fluctuation or crepitus.

## 2023-12-07 DIAGNOSIS — G43.109 MIGRAINE WITH AURA AND WITHOUT STATUS MIGRAINOSUS, NOT INTRACTABLE: Primary | ICD-10-CM

## 2023-12-07 RX ORDER — BUTALBITAL, ACETAMINOPHEN AND CAFFEINE 50; 325; 40 MG/1; MG/1; MG/1
TABLET ORAL
Qty: 10 TABLET | Refills: 3 | Status: SHIPPED | OUTPATIENT
Start: 2023-12-07

## 2023-12-08 ENCOUNTER — APPOINTMENT (OUTPATIENT)
Dept: LAB | Facility: HOSPITAL | Age: 48
End: 2023-12-08
Payer: MEDICARE

## 2023-12-08 ENCOUNTER — TELEPHONE (OUTPATIENT)
Dept: NEUROSURGERY | Facility: CLINIC | Age: 48
End: 2023-12-08

## 2023-12-08 ENCOUNTER — OFFICE VISIT (OUTPATIENT)
Dept: PODIATRY | Facility: CLINIC | Age: 48
End: 2023-12-08

## 2023-12-08 VITALS
DIASTOLIC BLOOD PRESSURE: 94 MMHG | HEIGHT: 63 IN | WEIGHT: 147 LBS | HEART RATE: 65 BPM | SYSTOLIC BLOOD PRESSURE: 163 MMHG | BODY MASS INDEX: 26.05 KG/M2

## 2023-12-08 DIAGNOSIS — D50.8 IRON DEFICIENCY ANEMIA SECONDARY TO INADEQUATE DIETARY IRON INTAKE: ICD-10-CM

## 2023-12-08 DIAGNOSIS — Z98.890 STATUS POST RIGHT FOOT SURGERY: Primary | ICD-10-CM

## 2023-12-08 DIAGNOSIS — D58.2 ELEVATED HEMOGLOBIN (HCC): ICD-10-CM

## 2023-12-08 LAB
ALBUMIN SERPL BCP-MCNC: 4.5 G/DL (ref 3.5–5)
ALP SERPL-CCNC: 59 U/L (ref 34–104)
ALT SERPL W P-5'-P-CCNC: 11 U/L (ref 7–52)
ANION GAP SERPL CALCULATED.3IONS-SCNC: 6 MMOL/L
AST SERPL W P-5'-P-CCNC: 14 U/L (ref 13–39)
BASOPHILS # BLD AUTO: 0.04 THOUSANDS/ÂΜL (ref 0–0.1)
BASOPHILS NFR BLD AUTO: 1 % (ref 0–1)
BILIRUB SERPL-MCNC: 0.51 MG/DL (ref 0.2–1)
BUN SERPL-MCNC: 13 MG/DL (ref 5–25)
CALCIUM SERPL-MCNC: 9.6 MG/DL (ref 8.4–10.2)
CHLORIDE SERPL-SCNC: 102 MMOL/L (ref 96–108)
CO2 SERPL-SCNC: 29 MMOL/L (ref 21–32)
CREAT SERPL-MCNC: 0.65 MG/DL (ref 0.6–1.3)
EOSINOPHIL # BLD AUTO: 0.19 THOUSAND/ÂΜL (ref 0–0.61)
EOSINOPHIL NFR BLD AUTO: 3 % (ref 0–6)
ERYTHROCYTE [DISTWIDTH] IN BLOOD BY AUTOMATED COUNT: 13 % (ref 11.6–15.1)
FERRITIN SERPL-MCNC: 102 NG/ML (ref 11–307)
FOLATE SERPL-MCNC: >22.3 NG/ML
GFR SERPL CREATININE-BSD FRML MDRD: 104 ML/MIN/1.73SQ M
GLUCOSE SERPL-MCNC: 79 MG/DL (ref 65–140)
HCT VFR BLD AUTO: 48.4 % (ref 34.8–46.1)
HGB BLD-MCNC: 16.1 G/DL (ref 11.5–15.4)
IMM GRANULOCYTES # BLD AUTO: 0.02 THOUSAND/UL (ref 0–0.2)
IMM GRANULOCYTES NFR BLD AUTO: 0 % (ref 0–2)
IRON SATN MFR SERPL: 32 % (ref 15–50)
IRON SERPL-MCNC: 92 UG/DL (ref 50–212)
LYMPHOCYTES # BLD AUTO: 1.29 THOUSANDS/ÂΜL (ref 0.6–4.47)
LYMPHOCYTES NFR BLD AUTO: 22 % (ref 14–44)
MCH RBC QN AUTO: 30.1 PG (ref 26.8–34.3)
MCHC RBC AUTO-ENTMCNC: 33.3 G/DL (ref 31.4–37.4)
MCV RBC AUTO: 91 FL (ref 82–98)
MONOCYTES # BLD AUTO: 0.3 THOUSAND/ÂΜL (ref 0.17–1.22)
MONOCYTES NFR BLD AUTO: 5 % (ref 4–12)
NEUTROPHILS # BLD AUTO: 4.13 THOUSANDS/ÂΜL (ref 1.85–7.62)
NEUTS SEG NFR BLD AUTO: 69 % (ref 43–75)
NRBC BLD AUTO-RTO: 0 /100 WBCS
PLATELET # BLD AUTO: 202 THOUSANDS/UL (ref 149–390)
PMV BLD AUTO: 9.5 FL (ref 8.9–12.7)
POTASSIUM SERPL-SCNC: 3.7 MMOL/L (ref 3.5–5.3)
PROT SERPL-MCNC: 6.6 G/DL (ref 6.4–8.4)
RBC # BLD AUTO: 5.35 MILLION/UL (ref 3.81–5.12)
SODIUM SERPL-SCNC: 137 MMOL/L (ref 135–147)
TIBC SERPL-MCNC: 292 UG/DL (ref 250–450)
UIBC SERPL-MCNC: 200 UG/DL (ref 155–355)
VIT B12 SERPL-MCNC: 326 PG/ML (ref 180–914)
WBC # BLD AUTO: 5.97 THOUSAND/UL (ref 4.31–10.16)

## 2023-12-08 PROCEDURE — 82746 ASSAY OF FOLIC ACID SERUM: CPT

## 2023-12-08 PROCEDURE — 36415 COLL VENOUS BLD VENIPUNCTURE: CPT

## 2023-12-08 PROCEDURE — 82728 ASSAY OF FERRITIN: CPT

## 2023-12-08 PROCEDURE — 82607 VITAMIN B-12: CPT

## 2023-12-08 PROCEDURE — 80053 COMPREHEN METABOLIC PANEL: CPT

## 2023-12-08 PROCEDURE — 83550 IRON BINDING TEST: CPT

## 2023-12-08 PROCEDURE — 99024 POSTOP FOLLOW-UP VISIT: CPT | Performed by: STUDENT IN AN ORGANIZED HEALTH CARE EDUCATION/TRAINING PROGRAM

## 2023-12-08 PROCEDURE — 83540 ASSAY OF IRON: CPT

## 2023-12-08 PROCEDURE — 85025 COMPLETE CBC W/AUTO DIFF WBC: CPT

## 2023-12-08 NOTE — TELEPHONE ENCOUNTER
Patient left message on nurse line yesterday evening requesting a call back regarding symptoms. I called her back this morning to obtain more info. She said for the past few days she has been experiencing vibrations in her chest, "almost as if her phone is on vibrate on her chest". It comes and goes at random times. Denies any chest pain or shortness of breath and this sensation does not radiate anywhere else. She did have terrible head pain yesterday that caused her to vomit but denies any dizziness, changes to her vision or mental status. Today, her head feels "sore" but nothing like yesterday and denies any further nausea/vomiting or neurological decline. Advised patient, will reach out to provider and call her back with any recommendations or concerns. She was appreciative. She is status post: right coronal V-A shunt by Dr. Linda Gongora in 12/2019 for pseudotumor cerebri with papilledema.

## 2023-12-08 NOTE — TELEPHONE ENCOUNTER
I would have her just continue to monitor her symptoms and see if they persist or continue to improve with time. If no longer having headaches or vomiting and vision is stable then unlikely to be related to the shunt which was placed for pseudotumor. I am not sure what to make of the vibration sensation across her chest.       Called patient and made her aware of recommendation. She is going to follow up with PCP for further workup of the etiology of the symptoms if it persists. She was appreciative.

## 2023-12-09 NOTE — PROGRESS NOTES
Assessment/Plan:    No problem-specific Assessment & Plan notes found for this encounter. Diagnoses and all orders for this visit:    Status post right foot surgery      Plan:     - Patient was counseled and educated on the condition and the diagnosis. The diagnosis, treatment options and prognosis were discussed in detail.   - Right 5th toe sutures removed and incision site has healed  - Recommended pt to transition to sneakers with WBAT   - Addressed all questions and concerns   - Return in 3-4 weeks for follow up, obtain right foot xray      Subjective:      Patient ID: Enrique Murry is a 51 y.o. female. Patient presents for post-op appointment s/p right fifth toe exostosis excision date of surgery 11/16/2023. Patient reports she is overall doing well however continues to discomfort to her surgical toe. She also has stubbed her to couple times at home. She has been tolerating slippers well. No other complaints. The following portions of the patient's history were reviewed and updated as appropriate: She  has a past medical history of Abdominal pain, Acute cystitis with hematuria (01/03/2020), Brain condition, Chronic kidney disease, COVID-19 virus infection (11/10/2022), Shermon Castelan Quervain's disease (tenosynovitis), Esophageal perforation, Gastric leak, GERD (gastroesophageal reflux disease), Headache, Idiopathic intracranial hypertension, Kidney stone, Moderate protein-calorie malnutrition ([address]) (02/21/2018), No blood products, Papilledema, both eyes, PONV (postoperative nausea and vomiting), Postgastrectomy malabsorption, Presence of lumboperitoneal shunt, Rotator cuff tendinitis, and Visual field defect.   She   Patient Active Problem List    Diagnosis Date Noted    B12 deficiency 07/25/2023    PONV (postoperative nausea and vomiting)     Obstruction of left ureteropelvic junction (UPJ) due to stone 05/03/2023    Chronic idiopathic constipation 09/23/2022    Macromastia 04/01/2022    S/P bilateral breast reduction 04/01/2022    Annual physical exam 12/17/2021    Iron deficiency anemia secondary to inadequate dietary iron intake 11/09/2021    Migraine with aura and without status migrainosus, not intractable 04/30/2021    Encounter for surgical aftercare following surgery of digestive system 09/24/2020    Papilledema 12/10/2019    Postgastrectomy malabsorption 09/20/2019    History of idiopathic intracranial hypertension 03/08/2019    Chronic tension-type headache, intractable 03/08/2019    PTSD (post-traumatic stress disorder) 01/10/2019    Renal calculi 10/04/2018    Refusal of influenza vaccine by provider 09/28/2018    Bariatric surgery status 06/22/2018    Choroidal nevus, right eye 02/03/2018    Refusal of blood product 01/31/2018    Murmur, cardiac 01/26/2018    S/P  shunt 01/24/2018    Gastroesophageal reflux disease 12/15/2017    Hypercholesteremia 08/11/2017    Pseudotumor cerebri 05/31/2017    Mixed incontinence 04/03/2017    Subacromial bursitis of right shoulder joint 09/16/2015    Family history of malignant neoplasm of gastrointestinal tract 07/09/2013     She  has a past surgical history that includes CSF shunt; Gastric bypass (12/19/2016); Hysterectomy (03/14/2013); pr crtj shunt dtzyghxzis-mukduysgh-ctdifmg terminus (Right, 05/31/2017); ESOPHAGOSCOPY WITH STENT INSERTION (N/A, 01/24/2018); Eye surgery (Bilateral, 1980); pr rplcmt/revj csf shunt valve/cath shunt sys (Right, 01/25/2018); LAPAROTOMY (N/A, 01/25/2018); pr rmvl compl csf shunt system w/o rplcmt shunt (Right, 02/05/2018); Esophagogastroduodenoscopy (N/A, 02/23/2018); Esophagogastroduodenoscopy (N/A, 02/23/2018); Esophagogastroduodenoscopy (N/A, 03/28/2018); Esophagogastroduodenoscopy (N/A, 04/05/2018); Esophagogastroduodenoscopy (N/A, 04/10/2018); pr egd transoral biopsy single/multiple (N/A, 06/27/2018); IR lumbar puncture (08/29/2018); Lumbar peritoneal shunt (11/19/2015);  Wrist surgery (Right); pr crtj shunt sgsvispmat-fnp-hcj-aur (Right, 12/18/2019); pr cysto bladder w/ureteral catheterization (N/A, 06/06/2020); Hiatal hernia repair; [place] retrograde pyelogram (06/24/2020); pr cysto/uretero w/lithotripsy &indwell stent insrt (Bilateral, 06/24/2020); pr cysto/uretero w/lithotripsy &indwell stent insrt (Left, 08/21/2021); FL retrograde pyelogram (08/21/2021); Breast biopsy (Left, 1993); pr breast reduction (Bilateral, 03/25/2022); Reduction mammaplasty (Bilateral); pr cysto bladder w/ureteral catheterization (Bilateral, 5/4/2023); FL retrograde pyelogram (5/4/2023); pr cysto/uretero w/lithotripsy &indwell stent insrt (N/A, 5/18/2023); FL retrograde pyelogram (5/18/2023); and Bone exostosis excision (Right, 11/16/2023). Current Outpatient Medications   Medication Sig Dispense Refill    ascorbic acid (VITAMIN C) 250 MG CHEW Chew 250 mg daily      Biotin 1 MG CAPS Take 1 mg by mouth daily        butalbital-acetaminophen-caffeine (FIORICET,ESGIC) -40 mg per tablet take 1 tablet by mouth every 6 hours if needed for headache or migraines -ONLY 10 PER MONTH 10 tablet 3    calcium citrate-vitamin D (CITRACAL+D) 315-200 MG-UNIT per tablet Take 1 tablet by mouth 2 (two) times a day      dexlansoprazole (DEXILANT) 60 MG capsule Take 1 capsule (60 mg total) by mouth daily 90 capsule 3    ferrous gluconate 324 (37.5 Fe) mg Take 324 mg by mouth 2 (two) times a day before meals      folic acid (FOLVITE) 1 mg tablet Take 2 tablets (2 mg total) by mouth daily 180 tablet 3    Multiple Vitamin (MULTIVITAMIN) tablet Take 1 tablet by mouth daily Wears a patch      Multiple Vitamins-Minerals (Hair/Skin/Nails) CAPS Take 1 tablet by mouth 2 (two) times a day        naloxone (NARCAN) 4 mg/0.1 mL nasal spray Administer 1 spray into a nostril. If no response after 2-3 minutes, give another dose in the other nostril using a new spray.  1 each 1    vitamin B-12 (VITAMIN B-12) 500 mcg tablet Take 500 mcg by mouth daily HYDROcodone-acetaminophen (Norco) 5-325 mg per tablet Take 1 tablet by mouth every 6 (six) hours as needed for pain Max Daily Amount: 4 tablets (Patient not taking: Reported on 11/27/2023) 15 tablet 0     No current facility-administered medications for this visit. .    Review of Systems   All other systems reviewed and are negative. Objective:      /94   Pulse 65   Ht 5' 3" (1.6 m)   Wt 66.7 kg (147 lb)   LMP  (LMP Unknown)   BMI 26.04 kg/m²          Physical Exam  Vitals reviewed. Musculoskeletal:      Right foot: Tenderness present. Comments: Incision site has healed to the right 5th toe. Tenderness to touch to the fifth toe. Toe is edematous. Able to wiggle all right 5 toes. Light touch sensation intact and cap refill time within normal limits.

## 2023-12-12 ENCOUNTER — OFFICE VISIT (OUTPATIENT)
Dept: HEMATOLOGY ONCOLOGY | Facility: CLINIC | Age: 48
End: 2023-12-12
Payer: MEDICARE

## 2023-12-12 VITALS
HEIGHT: 63 IN | DIASTOLIC BLOOD PRESSURE: 80 MMHG | HEART RATE: 62 BPM | WEIGHT: 148 LBS | OXYGEN SATURATION: 96 % | RESPIRATION RATE: 18 BRPM | SYSTOLIC BLOOD PRESSURE: 128 MMHG | BODY MASS INDEX: 26.22 KG/M2 | TEMPERATURE: 98.2 F

## 2023-12-12 DIAGNOSIS — E53.8 FOLIC ACID DEFICIENCY: ICD-10-CM

## 2023-12-12 DIAGNOSIS — E53.8 B12 DEFICIENCY: ICD-10-CM

## 2023-12-12 DIAGNOSIS — D50.8 IRON DEFICIENCY ANEMIA SECONDARY TO INADEQUATE DIETARY IRON INTAKE: Primary | ICD-10-CM

## 2023-12-12 PROCEDURE — 99214 OFFICE O/P EST MOD 30 MIN: CPT | Performed by: INTERNAL MEDICINE

## 2023-12-12 NOTE — PROGRESS NOTES
Hematology/Oncology Outpatient Follow-up  Edita Jean Baptiste 51 y.o. female 1975 [id number]    Date:  12/12/2023    Assessment and Plan:  1. Iron deficiency anemia secondary to inadequate dietary iron intake  The patient does not seem to be iron deficient or anemic at this point. She did receive 4 doses of Venofer IV during the summer of this year. She does have a history of bariatric gastric sleeve surgery which is a contributing factor for the decreased absorption of iron. Will see her again in about 6 months from now for follow-up. - CBC and differential; Future  - Comprehensive metabolic panel; Future  - Magnesium; Future  - Iron Panel (Includes Ferritin, Iron Sat%, Iron, and TIBC); Future  - Ferritin; Future  - Vitamin B12; Future  - C-reactive protein; Future  - Sedimentation rate, automated; Future  - LD,Blood; Future  - Folate; Future    2. B12 deficiency  She seems to have low normal vitamin B12 level even though she is getting vitamin B12 injections every 3 months. I did encourage her to take oral vitamin B12 supplements as well. - CBC and differential; Future  - Comprehensive metabolic panel; Future  - Magnesium; Future  - Iron Panel (Includes Ferritin, Iron Sat%, Iron, and TIBC); Future  - Ferritin; Future  - Vitamin B12; Future  - C-reactive protein; Future  - Sedimentation rate, automated; Future  - LD,Blood; Future  - Folate; Future    3. Folic acid deficiency  Her folic acid level is normal while on oral supplements. - CBC and differential; Future  - Comprehensive metabolic panel; Future  - Magnesium; Future  - Iron Panel (Includes Ferritin, Iron Sat%, Iron, and TIBC); Future  - Ferritin; Future  - Vitamin B12; Future  - C-reactive protein; Future  - Sedimentation rate, automated; Future  - LD,Blood; Future  - Folate;  Future      HPI:  Patient is a pleasant 48-year-old female with complicated past medical history who presented originally for further evaluation and treatment of her microcytic anemia/iron deficiency. She had gastric sleeve surgery 2016 followed by laparoscopic paraesophageal hernia repair in 2018 which later resulted in GE junction leak/fistula requiring open repair and lead to sepsis/inpatient hospitalization for 5 months. She also was diagnosed with pseudotumor cerebri in 2015 had  shunt placed which had to be removed with her sepsis and later had [facility] shunt placed. She has  PTSD as a result of her above-mentioned events, kidney stones and migraines. She does have a neurologist and gastroenterologist who is monitoring her. Her laboratory studies from 08/22/2021 showed microcytic hypochromic anemia H&H 8.6/30, MCV 77, MCH 22.1 her platelet count and white cells or normal.  BMP normal. She had significant iron deficiency iron saturation 5%, TIBC 414, serum iron 22 with ferritin 2. Her iron deficiency was corrected with IV iron Venofer x6 treatments November/December 2021. Interval history:  Patient came today for follow-up visit. She continues to have of her neurological symptoms which is being handled by the neurology team.  She was treated with 4 doses of Venofer around July/August of this year. She is also on vitamin B12 injections on every 3 months basis. Recent blood work on 12/8/2023 showed hemoglobin of 16.1 with hematocrit of 48.4%. White cells and platelets are within normal range. Creatinine 0.6 with normal calcium and liver enzymes. B12 is 326 with normal iron panel  ROS: Review of Systems   Constitutional:  Positive for fatigue. Negative for chills and fever. HENT:  Negative for ear pain and sore throat. Eyes:  Negative for pain and visual disturbance. Respiratory:  Negative for cough and shortness of breath. Cardiovascular:  Negative for chest pain and palpitations. Gastrointestinal:  Positive for constipation. Negative for abdominal pain and vomiting. Genitourinary:  Negative for dysuria and hematuria.    Musculoskeletal:  Negative for arthralgias and back pain. Skin:  Negative for color change and rash. Neurological:  Negative for seizures and syncope. Psychiatric/Behavioral:  Positive for sleep disturbance. All other systems reviewed and are negative. Past Medical History:   Diagnosis Date    Abdominal pain     Acute cystitis with hematuria 01/03/2020    Brain condition     Pseudotumor Cerebri     Chronic kidney disease     COVID-19 virus infection 11/10/2022    De Quervain's disease (tenosynovitis)     Esophageal perforation     Gastric leak     GERD (gastroesophageal reflux disease)     Headache     Idiopathic intracranial hypertension     Kidney stone     Moderate protein-calorie malnutrition ([address]) 02/21/2018    No blood products     per pt: personal and Taoist beliefs.  surgeon office aware 12/13/19    Papilledema, both eyes     PONV (postoperative nausea and vomiting)     Postgastrectomy malabsorption     Presence of lumboperitoneal shunt     Resolved: Sep 20, 2017    Rotator cuff tendinitis     Resolved: Aug 23, 2017    Visual field defect        Past Surgical History:   Procedure Laterality Date    BONE EXOSTOSIS EXCISION Right 11/16/2023    Procedure: EXCISION EXOSTOSIS 5TH TOE RIght;  Surgeon: Derrick Molina DPM;  Location: [facility];  Service: Podiatry    BREAST BIOPSY Left 1993    benign    CSF SHUNT      LP shunt - 2015 -  shunt - 2017    ESOPHAGOGASTRODUODENOSCOPY N/A 02/23/2018    Procedure: ESOPHAGOGASTRODUODENOSCOPY (EGD) WITH ESOPHAGEAL STENT PLACEMENT;  Surgeon: Prosper Ibarra MD;  [facility];  Service: Thoracic    ESOPHAGOGASTRODUODENOSCOPY N/A 02/23/2018    Procedure: ESOPHAGOGASTRODUODENOSCOPY (EGD) WITH REMOVAL ESOPHAGEAL STENT  AND REPLACEMENT WITH 23mm X155mm [address];  Surgeon: Prosper Ibarra MD;  [facility];  Service: Thoracic    ESOPHAGOGASTRODUODENOSCOPY N/A 03/28/2018    Procedure: ESOPHAGOGASTRODUODENOSCOPY (EGD) with PEJ placement.;  Surgeon: Terese Ayoub MD;  [facility]; Service: Gastroenterology    ESOPHAGOGASTRODUODENOSCOPY N/A 04/05/2018    Procedure: ESOPHAGOGASTRODUODENOSCOPY (EGD) with botox injection and kaofed placement;  Surgeon: Jesus Hanson DO;  [facility]; Service: Gastroenterology    ESOPHAGOGASTRODUODENOSCOPY N/A 04/10/2018    Procedure: ESOPHAGOGASTRODUODENOSCOPY (EGD) with Kaofed placement;  Surgeon: Missy Hernandez MD;  [facility]; Service: Gastroenterology    ESOPHAGOSCOPY WITH STENT INSERTION N/A 01/24/2018    Procedure: INSERTION STENT ESOPHAGEAL;  Surgeon: Justin Yarbrough MD;  [facility]; Service: Gastroenterology    EYE SURGERY Bilateral 1980    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)     FL RETROGRADE PYELOGRAM  06/24/2020    FL RETROGRADE PYELOGRAM  08/21/2021    FL RETROGRADE PYELOGRAM  5/4/2023    FL RETROGRADE PYELOGRAM  5/18/2023    GASTRIC BYPASS  12/19/2016    Lap sleeve gastrectomy w/shunt length shortening procedure    HIATAL HERNIA REPAIR      HYSTERECTOMY  03/14/2013    IR LUMBAR PUNCTURE  08/29/2018    LAPAROTOMY N/A 01/25/2018    Procedure: Exploratory Laparotomy, wash out,placement of drains, placement of NG feeding tube ;   Surgeon: Segundo Brasher DO;  [facility];  Service: General    LUMBAR PERITONEAL SHUNT  11/19/2015    Laparoscopic assisted    IN BREAST REDUCTION Bilateral 03/25/2022    Procedure: BILATERAL BREAST REDUCTION;  Surgeon: Aviva Amado MD;  [facility];  Service: Plastics    IN CRTJ SHUNT [id number] Right 12/18/2019    Procedure: IMAGE GUIDED INSERTION OF RIGHT CORONAL VENTRICULAR-ATRIAL SHUNT;  Surgeon: Martha Duvall MD;  [facility];  Service: Neurosurgery     [address] TERMINUS Right 05/31/2017    Procedure: IMAGE GUIDED CORONAL PLACEMENT OF PROGRAMABLE VENTRICULAR-PERITONEAL SHUNT, REMOVAL OF LP SHUNT ;  Surgeon: Martha Duvall MD;  [facility];  Service: Neurosurgery WV CYSTO BLADDER W/URETERAL CATHETERIZATION N/A 06/06/2020    Procedure: Bilateral CYSTOSCOPY RETROGRADE PYELOGRAM WITH INSERTION STENT URETERAL;  Surgeon: Yanna Bazan MD;  Location: [facility];  Service: Urology    WV CYSTO BLADDER W/URETERAL CATHETERIZATION Bilateral 5/4/2023    Procedure: CYSTOSCOPY RETROGRADE PYELOGRAM WITH INSERTION STENT URETERAL;  Surgeon: Braydon Bowers MD;  Location: [facility];  Service: Urology    WV CYSTO/URETERO W/LITHOTRIPSY &INDWELL STENT INSRT Bilateral 06/24/2020    Procedure: CYSTOSCOPY URETEROSCOPY WITH LITHOTRIPSY HOLMIUM LASER, RETROGRADE PYELOGRAM AND exchange bilateral  STENTs URETERAL;  Surgeon: Honorio Magallon MD;  [facility];  Service: Urology    WV CYSTO/URETERO W/LITHOTRIPSY &INDWELL STENT INSRT Left 08/21/2021    Procedure: CYSTOSCOPY; LEFT URETEROSCOPY WITH RETROGRADE PYELOGRAM, REMOVAL OF STONE AND INSERTION LEFT URETERAL STENT;  Surgeon: Leonie Hillman MD;  [facility];  Service: Urology    WV CYSTO/URETERO W/LITHOTRIPSY &INDWELL STENT INSRT N/A 5/18/2023    Procedure: CYSTOSCOPY URETEROSCOPY WITH LITHOTRIPSY HOLMIUM LASER, RETROGRADE PYELOGRAM, REMOVAL OF BILATERAL STENTS,  AND INSERTION STENT URETERAL LEFT ,BASKET EXTRACTION OF STONE LEFT SIDE;  Surgeon: Abundio López MD;  [facility];  Service: Urology    WV EGD TRANSORAL BIOPSY SINGLE/MULTIPLE N/A 06/27/2018    Procedure: ESOPHAGOGASTRODUODENOSCOPY (EGD) with padlock clip placement;  Surgeon: Omar Cody MD;  [facility];   Service: Bariatrics    WV RMVL COMPL CSF SHUNT SYSTEM W/O RPLCMT SHUNT Right 02/05/2018    Procedure: Removal of  shunt;  Surgeon: Nelson Tucker MD;  [facility];  Service: Neurosurgery    WV RPLCMT/REVJ CSF SHUNT VALVE/CATH SHUNT SYS Right 01/25/2018    Procedure: Externalization of right-sided SHUNT VENTRICULAR-PERITONEAL in anterior chest wall ribs two and three level  ;  Surgeon: Win Shah MD;  [facility];  Service: Neurosurgery    REDUCTION MAMMAPLASTY Bilateral     WRIST SURGERY Right     x3 ,        Social History     Socioeconomic History    Marital status: Single     Spouse name: None    Number of children: 2    Years of education: High School or GED     Highest education level: None   Occupational History    Occupation: employed    Tobacco Use    Smoking status: Former     Packs/day: 0.50     Years: 20.00     Total pack years: 10.00     Types: Cigarettes     Start date: 1992     Quit date: 2012     Years since quittin.3    Smokeless tobacco: Never   Vaping Use    Vaping Use: Every day    Substances: THC, CBD   Substance and Sexual Activity    Alcohol use: Never    Drug use: Not Currently     Frequency: 7.0 times per week     Types: Marijuana     Comment: medical marijuana, vaping & topical Last used 11/10/23    Sexual activity: Yes   Other Topics Concern    None   Social History Narrative    ** Merged History Encounter **          Social Determinants of Health     Financial Resource Strain: Not on file   Food Insecurity: No Food Insecurity (2023)    Hunger Vital Sign     Worried About Running Out of Food in the Last Year: Never true     [address] in the Last Year: Never true   Transportation Needs: No Transportation Needs (2023)    PRAPARE - Transportation     Lack of Transportation (Medical): No     Lack of Transportation (Non-Medical):  No   Physical Activity: Not on file   Stress: Not on file   Social Connections: Not on file   Intimate Partner Violence: Not on file   Housing Stability: Low Risk  (2023)    Housing Stability Vital Sign     Unable to Pay for Housing in the Last Year: No     Number of Places Lived in the Last Year: 1     Unstable Housing in the Last Year: No       Family History   Problem Relation Age of Onset    Kidney cancer Mother 77    No Known Problems Father     No Known Problems Sister     No Known Problems Daughter     No Known Problems Maternal Grandmother     Colon cancer Maternal Grandfather 44    No Known Problems Paternal Grandmother     Cancer Paternal Grandfather     Thyroid cancer Brother 36    No Known Problems Maternal Aunt     No Known Problems Maternal Aunt     No Known Problems Paternal Aunt     Cancer Family         Gastric    Leukemia Family     Colon cancer Family     Pancreatic cancer Family     Lung cancer Maternal Uncle 61       Allergies   Allergen Reactions    Benadryl [Diphenhydramine] Anaphylaxis     Throat closing    Phenergan [Promethazine] Anaphylaxis    Nsaids Other (See Comments)     Avoids due to Hx Gastric Sleeve         Current Outpatient Medications:     ascorbic acid (VITAMIN C) 250 MG CHEW, Chew 250 mg daily, Disp: , Rfl:     Biotin 1 MG CAPS, Take 1 mg by mouth daily  , Disp: , Rfl:     butalbital-acetaminophen-caffeine (FIORICET,ESGIC) -40 mg per tablet, take 1 tablet by mouth every 6 hours if needed for headache or migraines -ONLY 10 PER MONTH, Disp: 10 tablet, Rfl: 3    calcium citrate-vitamin D (CITRACAL+D) 315-200 MG-UNIT per tablet, Take 1 tablet by mouth 2 (two) times a day, Disp: , Rfl:     dexlansoprazole (DEXILANT) 60 MG capsule, Take 1 capsule (60 mg total) by mouth daily, Disp: 90 capsule, Rfl: 3    ferrous gluconate 324 (37.5 Fe) mg, Take 324 mg by mouth 2 (two) times a day before meals, Disp: , Rfl:     folic acid (FOLVITE) 1 mg tablet, Take 2 tablets (2 mg total) by mouth daily, Disp: 180 tablet, Rfl: 3    Multiple Vitamin (MULTIVITAMIN) tablet, Take 1 tablet by mouth daily Wears a patch, Disp: , Rfl:     Multiple Vitamins-Minerals (Hair/Skin/Nails) CAPS, Take 1 tablet by mouth 2 (two) times a day  , Disp: , Rfl:     vitamin B-12 (VITAMIN B-12) 500 mcg tablet, Take 500 mcg by mouth daily, Disp: , Rfl:     HYDROcodone-acetaminophen (Norco) 5-325 mg per tablet, Take 1 tablet by mouth every 6 (six) hours as needed for pain Max Daily Amount: 4 tablets (Patient not taking: Reported on 11/27/2023), Disp: 15 tablet, Rfl: 0    naloxone (NARCAN) 4 mg/0.1 mL nasal spray, Administer 1 spray into a nostril. If no response after 2-3 minutes, give another dose in the other nostril using a new spray., Disp: 1 each, Rfl: 1      Physical Exam:  /80 (BP Location: Left arm, Patient Position: Sitting, Cuff Size: Adult)   Pulse 62   Temp 98.2 °F (36.8 °C)   Resp 18   Ht 5' 3" (1.6 m)   Wt 67.1 kg (148 lb)   LMP  (LMP Unknown)   SpO2 96%   BMI 26.22 kg/m²     Physical Exam  Constitutional:       General: She is not in acute distress. Appearance: She is well-developed. She is not diaphoretic. HENT:      Head: Normocephalic and atraumatic. Nose: Nose normal.   Eyes:      General: No scleral icterus. Right eye: No discharge. Left eye: No discharge. Conjunctiva/sclera: Conjunctivae normal.      Pupils: Pupils are equal, round, and reactive to light. Neck:      Thyroid: No thyromegaly. Vascular: No JVD. Trachea: No tracheal deviation. Cardiovascular:      Rate and Rhythm: Normal rate and regular rhythm. Heart sounds: Normal heart sounds. No murmur heard. No friction rub. Pulmonary:      Effort: Pulmonary effort is normal. No respiratory distress. Breath sounds: Normal breath sounds. No stridor. No wheezing or rales. Chest:      Chest wall: No tenderness. Abdominal:      General: There is no distension. Palpations: Abdomen is soft. There is no hepatomegaly or splenomegaly. Tenderness: There is no abdominal tenderness. There is no guarding or rebound. Musculoskeletal:         General: No tenderness or deformity. Normal range of motion. Cervical back: Normal range of motion and neck supple. Lymphadenopathy:      Cervical: No cervical adenopathy. Skin:     General: Skin is warm and dry. Coloration: Skin is not pale. Findings: No erythema or rash. Neurological:      Mental Status: She is alert and oriented to person, place, and time. Cranial Nerves: No cranial nerve deficit. Coordination: Coordination normal.      Deep Tendon Reflexes: Reflexes are normal and symmetric. Psychiatric:         Behavior: Behavior normal.         Thought Content: Thought content normal.         Judgment: Judgment normal.           Labs:  Lab Results   Component Value Date    WBC 5.97 12/08/2023    HGB 16.1 (H) 12/08/2023    HCT 48.4 (H) 12/08/2023    MCV 91 12/08/2023     12/08/2023     Lab Results   Component Value Date     03/22/2018    K 3.7 12/08/2023     12/08/2023    CO2 29 12/08/2023    ANIONGAP 8.5 03/22/2018    BUN 13 12/08/2023    CREATININE 0.65 12/08/2023    GLUCOSE 94 01/25/2018    GLUF 143 (H) 05/04/2023    CALCIUM 9.6 12/08/2023    CORRECTEDCA 9.5 05/16/2023    AST 14 12/08/2023    ALT 11 12/08/2023    ALKPHOS 59 12/08/2023    EGFR 104 12/08/2023       Patient voiced understanding and agreement in the above discussion. Aware to contact our office with questions/symptoms in the interim.

## 2023-12-13 ENCOUNTER — TELEPHONE (OUTPATIENT)
Dept: NEUROLOGY | Facility: CLINIC | Age: 48
End: 2023-12-13

## 2023-12-13 NOTE — TELEPHONE ENCOUNTER
Received VM transcription from 12/13/23, 8:26 AM:    Hi, this is Blanca Nolan calling. If you could please give me a call back. My prescription at the pharmacy that Nelida Villanueva called in, they need approval for it. So if you could either do that or give me a call back. Thank you.  -------------------------    No fax located in clinical folder however, [facility] has key for Fioricet started from pharmacy on 12/7/23.    Key: [id number]

## 2023-12-19 NOTE — TELEPHONE ENCOUNTER
butalbital/APAP/caffeine approved from 12/15/23-12/15/24    Pt made aware of approval, states that she already picked this up yesterday

## 2023-12-26 ENCOUNTER — OFFICE VISIT (OUTPATIENT)
Dept: INTERNAL MEDICINE CLINIC | Facility: OTHER | Age: 48
End: 2023-12-26
Payer: MEDICARE

## 2023-12-26 VITALS
BODY MASS INDEX: 26.4 KG/M2 | HEART RATE: 56 BPM | WEIGHT: 149 LBS | TEMPERATURE: 98 F | HEIGHT: 63 IN | DIASTOLIC BLOOD PRESSURE: 88 MMHG | SYSTOLIC BLOOD PRESSURE: 124 MMHG | OXYGEN SATURATION: 99 %

## 2023-12-26 DIAGNOSIS — Z13.220 SCREENING CHOLESTEROL LEVEL: ICD-10-CM

## 2023-12-26 DIAGNOSIS — Z00.00 ANNUAL PHYSICAL EXAM: Primary | ICD-10-CM

## 2023-12-26 PROBLEM — Z80.51 FAMILY HISTORY OF KIDNEY CANCER: Status: ACTIVE | Noted: 2023-12-26

## 2023-12-26 PROBLEM — Z80.8 FAMILY HISTORY OF THYROID CANCER: Status: ACTIVE | Noted: 2023-12-26

## 2023-12-26 PROCEDURE — 99396 PREV VISIT EST AGE 40-64: CPT | Performed by: FAMILY MEDICINE

## 2023-12-26 NOTE — PROGRESS NOTES
Assessment/Plan:    1. Annual physical exam    2. Screening cholesterol level  -     Lipid Panel with Direct LDL reflex; Future; Expected date: 12/27/2023        Depression Screening and Follow-up Plan: Patient was screened for depression during today's encounter. They screened negative with a PHQ-2 score of 0.         There are no Patient Instructions on file for this visit.    No follow-ups on file.    Subjective:      Patient ID: Shweta Nolan is a 48 y.o. female.    Chief Complaint   Patient presents with    Physical Exam       HPI      Adult Annual Physical   Patient here for a comprehensive physical exam. The patient reports no problems.    Diet and Physical Activity  Diet/Nutrition: well balanced diet.   Exercise:   weighted hoola hoop regularly .      Depression Screening  PHQ-9 Depression Screening    PHQ-9:    Frequency of the following problems over the past two weeks:            General Health  Sleep: sleeps well.   Hearing: normal - bilateral.  Vision: no vision problems.   Dental: regular dental visits.       /GYN Health  Patient is: postmenopausal  Last menstrual period: hysterectomy   Contraceptive method: menopause  Breast exams: no  Mammo UTD normal  [place]- due, has GI Dr    The following portions of the patient's history were reviewed and updated as appropriate: allergies, current medications, past family history, past medical history, past social history, past surgical history and problem list.    Review of Systems      Constitutional:  Denies fever or chills   Eyes:  Denies double , blurry vision or eye pain  HENT:  Denies nasal congestion, sore throat or new hearing issues  Respiratory:  Denies cough or shortness of breath or wheezing  Cardiovascular:  Denies palpitations or chest pain  GI:  Denies abdominal pain, nausea, or vomiting, no loose stools, no reflux  Integument:  Denies rash , no open areas  Neurologic:  Denies headache or focal weakness, no dizziness  : no dysuria, or  "hematuria        Current Outpatient Medications   Medication Sig Dispense Refill    ascorbic acid (VITAMIN C) 250 MG CHEW Chew 250 mg daily      Biotin 1 MG CAPS Take 1 mg by mouth daily        butalbital-acetaminophen-caffeine (FIORICET,ESGIC) -40 mg per tablet take 1 tablet by mouth every 6 hours if needed for headache or migraines -ONLY 10 PER MONTH 10 tablet 3    calcium citrate-vitamin D (CITRACAL+D) 315-200 MG-UNIT per tablet Take 1 tablet by mouth 2 (two) times a day      dexlansoprazole (DEXILANT) 60 MG capsule Take 1 capsule (60 mg total) by mouth daily 90 capsule 3    ferrous gluconate 324 (37.5 Fe) mg Take 324 mg by mouth 2 (two) times a day before meals      folic acid (FOLVITE) 1 mg tablet Take 2 tablets (2 mg total) by mouth daily 180 tablet 3    Multiple Vitamin (MULTIVITAMIN) tablet Take 1 tablet by mouth daily Wears a patch      Multiple Vitamins-Minerals (Hair/Skin/Nails) CAPS Take 1 tablet by mouth 2 (two) times a day        naloxone (NARCAN) 4 mg/0.1 mL nasal spray Administer 1 spray into a nostril. If no response after 2-3 minutes, give another dose in the other nostril using a new spray. 1 each 1    vitamin B-12 (VITAMIN B-12) 500 mcg tablet Take 500 mcg by mouth daily       No current facility-administered medications for this visit.       Objective:    /88 (BP Location: Left arm, Patient Position: Sitting, Cuff Size: Adult)   Pulse 56   Temp 98 °F (36.7 °C)   Ht 5' 3\" (1.6 m)   Wt 67.6 kg (149 lb)   LMP  (LMP Unknown)   SpO2 99%   BMI 26.39 kg/m²        Physical Exam       Constitutional:  Well developed, well nourished, no acute distress, non-toxic appearance   Eyes:  PERRL, conjunctiva normal , non icteric sclera  HENT:  Atraumatic, oropharynx moist. Neck-  supple   Respiratory:  CTA b/l, normal breath sounds, no rales, no wheezing   Cardiovascular:  RRR, no murmurs, no LE edema b/l  GI:  Soft, nondistended, normal bowel sounds x 4, nontender, no organomegaly, no mass, no " rebound, no guarding   Neurologic:  no focal deficits noted   Psychiatric:  Speech and behavior appropriate , AAO x 3      Randal Boss DO

## 2024-01-03 ENCOUNTER — OFFICE VISIT (OUTPATIENT)
Dept: PODIATRY | Facility: CLINIC | Age: 49
End: 2024-01-03

## 2024-01-03 ENCOUNTER — APPOINTMENT (OUTPATIENT)
Dept: RADIOLOGY | Facility: CLINIC | Age: 49
End: 2024-01-03
Payer: MEDICARE

## 2024-01-03 VITALS
WEIGHT: 149 LBS | HEIGHT: 63 IN | SYSTOLIC BLOOD PRESSURE: 164 MMHG | BODY MASS INDEX: 26.4 KG/M2 | HEART RATE: 61 BPM | DIASTOLIC BLOOD PRESSURE: 74 MMHG

## 2024-01-03 DIAGNOSIS — M89.8X9 EXOSTOSIS: ICD-10-CM

## 2024-01-03 DIAGNOSIS — Z98.890 STATUS POST RIGHT FOOT SURGERY: Primary | ICD-10-CM

## 2024-01-03 DIAGNOSIS — Z98.890 STATUS POST RIGHT FOOT SURGERY: ICD-10-CM

## 2024-01-03 PROCEDURE — 73630 X-RAY EXAM OF FOOT: CPT

## 2024-01-03 PROCEDURE — 99024 POSTOP FOLLOW-UP VISIT: CPT | Performed by: STUDENT IN AN ORGANIZED HEALTH CARE EDUCATION/TRAINING PROGRAM

## 2024-01-03 NOTE — PROGRESS NOTES
Assessment/Plan:    No problem-specific Assessment & Plan notes found for this encounter.       Diagnoses and all orders for this visit:    Status post right foot surgery  -     X-ray foot right 3+ views; Future    Exostosis      Plan:     - Patient was counseled and educated on the condition and the diagnosis.  The diagnosis, treatment options and prognosis were discussed in detail.   -Right foot x-rays obtained, I personally interpreted x-ray finding with the patient which is consistent with postoperative changes of the distal phalanx exostosis excision.  -Recommended patient to wear supportive shoes such as wider toebox sneakers.  Patient was informed swelling to the surgical toe may take few months to completely resolve.  She expressed understanding.  -Return in 6 weeks for reevaluation    Subjective:      Patient ID: Shweta Nolan is a 48 y.o. female.    Patient presents for post-op appointment s/p right fifth toe exostosis excision date of surgery 11/16/2023.  Patient reports she is doing well without any discomfort.  She wears wider toebox fabric shoes and Birkenstock.  No other complaints at this time.          The following portions of the patient's history were reviewed and updated as appropriate: She  has a past medical history of Abdominal pain, Acute cystitis with hematuria (01/03/2020), Brain condition, Chronic kidney disease, COVID-19 virus infection (11/10/2022), De Quervain's disease (tenosynovitis), Esophageal perforation, Gastric leak, GERD (gastroesophageal reflux disease), Headache, Idiopathic intracranial hypertension, Kidney stone, Moderate protein-calorie malnutrition (HCC) (02/21/2018), No blood products, Papilledema, both eyes, PONV (postoperative nausea and vomiting), Postgastrectomy malabsorption, Presence of lumboperitoneal shunt, Rotator cuff tendinitis, and Visual field defect.  She   Patient Active Problem List    Diagnosis Date Noted    Family history of kidney cancer 12/26/2023     Family history of thyroid cancer 12/26/2023    B12 deficiency 07/25/2023    PONV (postoperative nausea and vomiting)     Obstruction of left ureteropelvic junction (UPJ) due to stone 05/03/2023    Chronic idiopathic constipation 09/23/2022    Macromastia 04/01/2022    S/P bilateral breast reduction 04/01/2022    Annual physical exam 12/17/2021    Iron deficiency anemia secondary to inadequate dietary iron intake 11/09/2021    Migraine with aura and without status migrainosus, not intractable 04/30/2021    Encounter for surgical aftercare following surgery of digestive system 09/24/2020    Papilledema 12/10/2019    Postgastrectomy malabsorption 09/20/2019    History of idiopathic intracranial hypertension 03/08/2019    Chronic tension-type headache, intractable 03/08/2019    PTSD (post-traumatic stress disorder) 01/10/2019    Renal calculi 10/04/2018    Refusal of influenza vaccine by provider 09/28/2018    Bariatric surgery status 06/22/2018    Choroidal nevus, right eye 02/03/2018    Refusal of blood product 01/31/2018    Murmur, cardiac 01/26/2018    S/P  shunt 01/24/2018    Gastroesophageal reflux disease 12/15/2017    Hypercholesteremia 08/11/2017    Pseudotumor cerebri 05/31/2017    Mixed incontinence 04/03/2017    Subacromial bursitis of right shoulder joint 09/16/2015    Family history of malignant neoplasm of gastrointestinal tract 07/09/2013     She  has a past surgical history that includes CSF shunt; Gastric bypass (12/19/2016); Hysterectomy (03/14/2013); pr crtj shunt pqlobcbvnh-fdrdqtsha-tavvhyy terminus (Right, 05/31/2017); ESOPHAGOSCOPY WITH STENT INSERTION (N/A, 01/24/2018); Eye surgery (Bilateral, 1980); pr rplcmt/revj csf shunt valve/cath shunt sys (Right, 01/25/2018); LAPAROTOMY (N/A, 01/25/2018); pr rmvl compl csf shunt system w/o rplcmt shunt (Right, 02/05/2018); Esophagogastroduodenoscopy (N/A, 02/23/2018); Esophagogastroduodenoscopy (N/A, 02/23/2018); Esophagogastroduodenoscopy (N/A,  03/28/2018); Esophagogastroduodenoscopy (N/A, 04/05/2018); Esophagogastroduodenoscopy (N/A, 04/10/2018); pr egd transoral biopsy single/multiple (N/A, 06/27/2018); IR lumbar puncture (08/29/2018); Lumbar peritoneal shunt (11/19/2015); Wrist surgery (Right); pr crtj shunt scteqcziqq-fag-ulr-aur (Right, 12/18/2019); pr cysto bladder w/ureteral catheterization (N/A, 06/06/2020); Hiatal hernia repair; FL retrograde pyelogram (06/24/2020); pr cysto/uretero w/lithotripsy &indwell stent insrt (Bilateral, 06/24/2020); pr cysto/uretero w/lithotripsy &indwell stent insrt (Left, 08/21/2021); FL retrograde pyelogram (08/21/2021); Breast biopsy (Left, 1993); pr breast reduction (Bilateral, 03/25/2022); Reduction mammaplasty (Bilateral); pr cysto bladder w/ureteral catheterization (Bilateral, 5/4/2023); FL retrograde pyelogram (5/4/2023); pr cysto/uretero w/lithotripsy &indwell stent insrt (N/A, 5/18/2023); FL retrograde pyelogram (5/18/2023); and Bone exostosis excision (Right, 11/16/2023).  Current Outpatient Medications   Medication Sig Dispense Refill    ascorbic acid (VITAMIN C) 250 MG CHEW Chew 250 mg daily      Biotin 1 MG CAPS Take 1 mg by mouth daily        butalbital-acetaminophen-caffeine (FIORICET,ESGIC) -40 mg per tablet take 1 tablet by mouth every 6 hours if needed for headache or migraines -ONLY 10 PER MONTH 10 tablet 3    calcium citrate-vitamin D (CITRACAL+D) 315-200 MG-UNIT per tablet Take 1 tablet by mouth 2 (two) times a day      dexlansoprazole (DEXILANT) 60 MG capsule Take 1 capsule (60 mg total) by mouth daily 90 capsule 3    ferrous gluconate 324 (37.5 Fe) mg Take 324 mg by mouth 2 (two) times a day before meals      folic acid (FOLVITE) 1 mg tablet Take 2 tablets (2 mg total) by mouth daily 180 tablet 3    Multiple Vitamin (MULTIVITAMIN) tablet Take 1 tablet by mouth daily Wears a patch      Multiple Vitamins-Minerals (Hair/Skin/Nails) CAPS Take 1 tablet by mouth 2 (two) times a day        naloxone  "(NARCAN) 4 mg/0.1 mL nasal spray Administer 1 spray into a nostril. If no response after 2-3 minutes, give another dose in the other nostril using a new spray. 1 each 1    vitamin B-12 (VITAMIN B-12) 500 mcg tablet Take 500 mcg by mouth daily       No current facility-administered medications for this visit.   .    Review of Systems   All other systems reviewed and are negative.        Objective:      /74 (BP Location: Left arm, Patient Position: Sitting, Cuff Size: Standard)   Pulse 61   Ht 5' 3\" (1.6 m)   Wt 67.6 kg (149 lb) Comment: reported  LMP  (LMP Unknown)   BMI 26.39 kg/m²          Physical Exam  Vitals reviewed.   Musculoskeletal:      Right foot: Tenderness present.      Comments: Very mild tenderness to touch noted along the incision site of the right fifth toe.  Incision site has healed.  Mild edema noted.  Patient able to wiggle all her toes.  Palpable pedal pulses capillary fill time within normal limits.           "

## 2024-01-04 ENCOUNTER — HOSPITAL ENCOUNTER (OUTPATIENT)
Dept: INFUSION CENTER | Facility: HOSPITAL | Age: 49
End: 2024-01-04
Payer: MEDICARE

## 2024-01-04 ENCOUNTER — HOSPITAL ENCOUNTER (OUTPATIENT)
Dept: INFUSION CENTER | Facility: HOSPITAL | Age: 49
End: 2024-01-04
Attending: INTERNAL MEDICINE
Payer: MEDICARE

## 2024-01-04 VITALS
HEART RATE: 55 BPM | TEMPERATURE: 97.1 F | DIASTOLIC BLOOD PRESSURE: 73 MMHG | SYSTOLIC BLOOD PRESSURE: 152 MMHG | RESPIRATION RATE: 16 BRPM

## 2024-01-04 DIAGNOSIS — Z13.220 SCREENING CHOLESTEROL LEVEL: ICD-10-CM

## 2024-01-04 DIAGNOSIS — G43.109 MIGRAINE WITH AURA AND WITHOUT STATUS MIGRAINOSUS, NOT INTRACTABLE: Primary | ICD-10-CM

## 2024-01-04 DIAGNOSIS — D50.8 IRON DEFICIENCY ANEMIA SECONDARY TO INADEQUATE DIETARY IRON INTAKE: Primary | ICD-10-CM

## 2024-01-04 LAB
CHOLEST SERPL-MCNC: 309 MG/DL
HDLC SERPL-MCNC: 75 MG/DL
LDLC SERPL CALC-MCNC: 217 MG/DL (ref 0–100)
TRIGL SERPL-MCNC: 84 MG/DL

## 2024-01-04 PROCEDURE — 96365 THER/PROPH/DIAG IV INF INIT: CPT

## 2024-01-04 PROCEDURE — 96372 THER/PROPH/DIAG INJ SC/IM: CPT

## 2024-01-04 PROCEDURE — 80061 LIPID PANEL: CPT

## 2024-01-04 RX ORDER — CYANOCOBALAMIN 1000 UG/ML
1000 INJECTION, SOLUTION INTRAMUSCULAR; SUBCUTANEOUS ONCE
OUTPATIENT
Start: 2024-03-27

## 2024-01-04 RX ORDER — SODIUM CHLORIDE 9 MG/ML
20 INJECTION, SOLUTION INTRAVENOUS ONCE
OUTPATIENT
Start: 2024-03-27

## 2024-01-04 RX ORDER — CYANOCOBALAMIN 1000 UG/ML
1000 INJECTION, SOLUTION INTRAMUSCULAR; SUBCUTANEOUS ONCE
Status: COMPLETED | OUTPATIENT
Start: 2024-01-04 | End: 2024-01-04

## 2024-01-04 RX ORDER — SODIUM CHLORIDE 9 MG/ML
20 INJECTION, SOLUTION INTRAVENOUS ONCE
Status: COMPLETED | OUTPATIENT
Start: 2024-01-04 | End: 2024-01-04

## 2024-01-04 RX ADMIN — EPTINEZUMAB-JJMR 300 MG: 100 INJECTION INTRAVENOUS at 15:33

## 2024-01-04 RX ADMIN — CYANOCOBALAMIN 1000 MCG: 1000 INJECTION, SOLUTION INTRAMUSCULAR at 16:19

## 2024-01-04 RX ADMIN — SODIUM CHLORIDE 20 ML/HR: 0.9 INJECTION, SOLUTION INTRAVENOUS at 15:16

## 2024-02-06 DIAGNOSIS — E78.00 HYPERCHOLESTEREMIA: Primary | ICD-10-CM

## 2024-02-06 RX ORDER — ATORVASTATIN CALCIUM 10 MG/1
10 TABLET, FILM COATED ORAL
Qty: 90 TABLET | Refills: 1 | Status: SHIPPED | OUTPATIENT
Start: 2024-02-06

## 2024-02-12 ENCOUNTER — OFFICE VISIT (OUTPATIENT)
Dept: PODIATRY | Facility: CLINIC | Age: 49
End: 2024-02-12
Payer: MEDICARE

## 2024-02-12 ENCOUNTER — APPOINTMENT (OUTPATIENT)
Dept: RADIOLOGY | Facility: CLINIC | Age: 49
End: 2024-02-12
Payer: MEDICARE

## 2024-02-12 VITALS
SYSTOLIC BLOOD PRESSURE: 167 MMHG | DIASTOLIC BLOOD PRESSURE: 96 MMHG | BODY MASS INDEX: 26.4 KG/M2 | WEIGHT: 149 LBS | HEIGHT: 63 IN | HEART RATE: 66 BPM

## 2024-02-12 DIAGNOSIS — M79.674 TOE PAIN, RIGHT: Primary | ICD-10-CM

## 2024-02-12 DIAGNOSIS — Z98.890 STATUS POST RIGHT FOOT SURGERY: ICD-10-CM

## 2024-02-12 DIAGNOSIS — Z01.818 PREOP TESTING: ICD-10-CM

## 2024-02-12 DIAGNOSIS — M89.8X9 EXOSTOSIS: ICD-10-CM

## 2024-02-12 PROCEDURE — 99214 OFFICE O/P EST MOD 30 MIN: CPT | Performed by: STUDENT IN AN ORGANIZED HEALTH CARE EDUCATION/TRAINING PROGRAM

## 2024-02-12 PROCEDURE — 73630 X-RAY EXAM OF FOOT: CPT

## 2024-02-12 RX ORDER — CHLORHEXIDINE GLUCONATE ORAL RINSE 1.2 MG/ML
15 SOLUTION DENTAL ONCE
OUTPATIENT
Start: 2024-02-12 | End: 2024-02-12

## 2024-02-12 NOTE — PROGRESS NOTES
Assessment/Plan:    No problem-specific Assessment & Plan notes found for this encounter.       Diagnoses and all orders for this visit:    Toe pain, right  -     X-ray foot right 3+ views; Future  -     Case request operating room: EXCISION EXOSTOSIS 5TH TOE; Standing  -     Case request operating room: EXCISION EXOSTOSIS 5TH TOE    Exostosis  -     Case request operating room: EXCISION EXOSTOSIS 5TH TOE; Standing  -     Case request operating room: EXCISION EXOSTOSIS 5TH TOE    Preop testing  -     CBC and Platelet; Future  -     Basic metabolic panel; Future  -     EKG 12 lead; Future    Other orders  -     Nursing Communication Warmimg Interventions Implemented; Standing  -     Nursing Communication CHG bath, have staff wash entire body (neck down) per pre-op bathing protocol. Routine, evening prior to, and day of surgery.; Standing  -     Nursing Communication Swab both nares with Povidone-Iodine solution, EXCLUDE if patient has shellfish/Iodine allergy, and replace with nasal alcohol swabstick. Routine, day of surgery, on call to OR; Standing  -     chlorhexidine (PERIDEX) 0.12 % oral rinse 15 mL  -     Void on call to OR; Standing  -     Insert peripheral IV; Standing  -     ceFAZolin (ANCEF) 2,000 mg in dextrose 5 % 100 mL IVPB      Plan:     - Patient was counseled and educated on the condition and the diagnosis.  The diagnosis, treatment options and prognosis were discussed in detail.   -Right Foot x-rays obtained, I personally reviewed the x-ray finding with patient which is consistent with a small exostosis noted on the distal phalanx laterally, otherwise no acute osseous abnormality.    -Plan for revision Right fifth digit exostosis excision/removal of toenail vs. possible arthroplasty of the 5th toe.   -Discussed risks benefits and alternative to the surgery.  Patient reports at this time she is interested in surgical treatments as conservative has not helped.  -We discussed in length the postoperative  course which will include weight-bear as tolerated surgical shoe until incision heals        -I was very clear at the beginning of the discussion about alternatives to this surgery including benign neglect, bracing, and second surgical opinions. I spent time to discuss with the patient the surgical procedure(s) as as listed above, pre-op testing, and post-op course required to properly heal the surgery. I discussed risks as infection, scar, swelling, chronic pain, poor healing of incision or bone that could require more surgery, incomplete correction of deformities or recurrence of deformities, change in shape of foot, toe, or walking and function, numbness, neuritis/RSD, blood clots in the leg or lung, and even death from anesthesia complications. No guarantees were given and the possibility of recurrent deformity or incomplete correction were discussed before patient signed the consent form. We also discussed the need for possible anticoagulation. The offloading device necessary after the surgery will be surgical shoe. The surgery, history and physical and PATS will be scheduled    Subjective:      Patient ID: Shweta Nolan is a 48 y.o. female.    48-year-old female with past medical history as below presents for follow-up of status post right fifth digit excision of exostosis.  Patient reports she is having same discomfort and pain that she used to have before the surgery feels like the sperm may have grown back.  She is still unable to wear socks as well as tight fitting shoes and certain sneakers.  It is an aching and at times sharp shooting pain with direct pressure just adjacent to the lateral toenail border on the fifth toe.  She is interested in exploring surgical treatment options to remove the spur.  No other complaints at this time.  Denies nausea vomit fever chills shortness of breath.        The following portions of the patient's history were reviewed and updated as appropriate: She  has a past  medical history of Abdominal pain, Acute cystitis with hematuria (01/03/2020), Brain condition, Chronic kidney disease, COVID-19 virus infection (11/10/2022), De Quervain's disease (tenosynovitis), Esophageal perforation, Gastric leak, GERD (gastroesophageal reflux disease), Headache, Idiopathic intracranial hypertension, Kidney stone, Moderate protein-calorie malnutrition (HCC) (02/21/2018), No blood products, Papilledema, both eyes, PONV (postoperative nausea and vomiting), Postgastrectomy malabsorption, Presence of lumboperitoneal shunt, Rotator cuff tendinitis, and Visual field defect.  She   Patient Active Problem List    Diagnosis Date Noted    Family history of kidney cancer 12/26/2023    Family history of thyroid cancer 12/26/2023    B12 deficiency 07/25/2023    PONV (postoperative nausea and vomiting)     Obstruction of left ureteropelvic junction (UPJ) due to stone 05/03/2023    Chronic idiopathic constipation 09/23/2022    Macromastia 04/01/2022    S/P bilateral breast reduction 04/01/2022    Annual physical exam 12/17/2021    Iron deficiency anemia secondary to inadequate dietary iron intake 11/09/2021    Migraine with aura and without status migrainosus, not intractable 04/30/2021    Encounter for surgical aftercare following surgery of digestive system 09/24/2020    Papilledema 12/10/2019    Postgastrectomy malabsorption 09/20/2019    History of idiopathic intracranial hypertension 03/08/2019    Chronic tension-type headache, intractable 03/08/2019    PTSD (post-traumatic stress disorder) 01/10/2019    Renal calculi 10/04/2018    Refusal of influenza vaccine by provider 09/28/2018    Bariatric surgery status 06/22/2018    Choroidal nevus, right eye 02/03/2018    Refusal of blood product 01/31/2018    Murmur, cardiac 01/26/2018    S/P  shunt 01/24/2018    Gastroesophageal reflux disease 12/15/2017    Hypercholesteremia 08/11/2017    Pseudotumor cerebri 05/31/2017    Mixed incontinence 04/03/2017     Subacromial bursitis of right shoulder joint 09/16/2015    Family history of malignant neoplasm of gastrointestinal tract 07/09/2013     She  has a past surgical history that includes CSF shunt; Gastric bypass (12/19/2016); Hysterectomy (03/14/2013); pr crtj shunt ijsvyvzlmp-fhaprfrir-tdphaan terminus (Right, 05/31/2017); ESOPHAGOSCOPY WITH STENT INSERTION (N/A, 01/24/2018); Eye surgery (Bilateral, 1980); pr rplcmt/revj csf shunt valve/cath shunt sys (Right, 01/25/2018); LAPAROTOMY (N/A, 01/25/2018); pr rmvl compl csf shunt system w/o rplcmt shunt (Right, 02/05/2018); Esophagogastroduodenoscopy (N/A, 02/23/2018); Esophagogastroduodenoscopy (N/A, 02/23/2018); Esophagogastroduodenoscopy (N/A, 03/28/2018); Esophagogastroduodenoscopy (N/A, 04/05/2018); Esophagogastroduodenoscopy (N/A, 04/10/2018); pr egd transoral biopsy single/multiple (N/A, 06/27/2018); IR lumbar puncture (08/29/2018); Lumbar peritoneal shunt (11/19/2015); Wrist surgery (Right); pr crtj shunt inkhefhsay-zjo-bmw-aur (Right, 12/18/2019); pr cysto bladder w/ureteral catheterization (N/A, 06/06/2020); Hiatal hernia repair; FL retrograde pyelogram (06/24/2020); pr cysto/uretero w/lithotripsy &indwell stent insrt (Bilateral, 06/24/2020); pr cysto/uretero w/lithotripsy &indwell stent insrt (Left, 08/21/2021); FL retrograde pyelogram (08/21/2021); Breast biopsy (Left, 1993); pr breast reduction (Bilateral, 03/25/2022); Reduction mammaplasty (Bilateral); pr cysto bladder w/ureteral catheterization (Bilateral, 5/4/2023); FL retrograde pyelogram (5/4/2023); pr cysto/uretero w/lithotripsy &indwell stent insrt (N/A, 5/18/2023); FL retrograde pyelogram (5/18/2023); and Bone exostosis excision (Right, 11/16/2023).  Current Outpatient Medications   Medication Sig Dispense Refill    ascorbic acid (VITAMIN C) 250 MG CHEW Chew 250 mg daily      atorvastatin (LIPITOR) 10 mg tablet Take 1 tablet (10 mg total) by mouth daily at bedtime 90 tablet 1    Biotin 1 MG CAPS Take  "1 mg by mouth daily        butalbital-acetaminophen-caffeine (FIORICET,ESGIC) -40 mg per tablet take 1 tablet by mouth every 6 hours if needed for headache or migraines -ONLY 10 PER MONTH 10 tablet 3    calcium citrate-vitamin D (CITRACAL+D) 315-200 MG-UNIT per tablet Take 1 tablet by mouth 2 (two) times a day      dexlansoprazole (DEXILANT) 60 MG capsule Take 1 capsule (60 mg total) by mouth daily 90 capsule 3    ferrous gluconate 324 (37.5 Fe) mg Take 324 mg by mouth 2 (two) times a day before meals      folic acid (FOLVITE) 1 mg tablet Take 2 tablets (2 mg total) by mouth daily 180 tablet 3    Multiple Vitamin (MULTIVITAMIN) tablet Take 1 tablet by mouth daily Wears a patch      Multiple Vitamins-Minerals (Hair/Skin/Nails) CAPS Take 1 tablet by mouth 2 (two) times a day        naloxone (NARCAN) 4 mg/0.1 mL nasal spray Administer 1 spray into a nostril. If no response after 2-3 minutes, give another dose in the other nostril using a new spray. 1 each 1    vitamin B-12 (VITAMIN B-12) 500 mcg tablet Take 500 mcg by mouth daily       No current facility-administered medications for this visit.   .    Review of Systems   Constitutional:  Negative for chills.   Respiratory:  Negative for shortness of breath.    Gastrointestinal:  Negative for vomiting.   Musculoskeletal:  Positive for arthralgias.   Skin:  Negative for color change.   Neurological:  Negative for dizziness.   Psychiatric/Behavioral:  Negative for agitation.    All other systems reviewed and are negative.        Objective:      /96 (BP Location: Left arm, Patient Position: Sitting, Cuff Size: Large)   Pulse 66   Ht 5' 3\" (1.6 m)   Wt 67.6 kg (149 lb)   LMP  (LMP Unknown)   BMI 26.39 kg/m²          Physical Exam  Vitals reviewed.   Cardiovascular:      Rate and Rhythm: Normal rate.      Pulses: Normal pulses.           Dorsalis pedis pulses are 2+ on the right side.        Posterior tibial pulses are 2+ on the right side. "   Musculoskeletal:         General: Swelling and tenderness present.      Right foot: Swelling and tenderness present.      Comments: Tenderness to touch noted distal right fifth digit just adjacent to the lateral toenail border/nail plate.  With palpation no prominence noted. Toenail does not appear to be ingrown.  Mild edema noted to the fifth digits consistent postoperative changes due to recent surgery.  Light touch sensation intact.  Toenail plate intact to the nailbed.   Skin:     General: Skin is warm.   Neurological:      General: No focal deficit present.      Mental Status: She is alert.   Psychiatric:         Mood and Affect: Mood normal.

## 2024-02-14 ENCOUNTER — PREP FOR PROCEDURE (OUTPATIENT)
Dept: PODIATRY | Facility: CLINIC | Age: 49
End: 2024-02-14

## 2024-02-17 ENCOUNTER — APPOINTMENT (OUTPATIENT)
Dept: LAB | Facility: HOSPITAL | Age: 49
End: 2024-02-17
Payer: MEDICARE

## 2024-02-17 ENCOUNTER — OFFICE VISIT (OUTPATIENT)
Dept: LAB | Facility: HOSPITAL | Age: 49
End: 2024-02-17
Payer: MEDICARE

## 2024-02-17 DIAGNOSIS — E78.00 HYPERCHOLESTEREMIA: ICD-10-CM

## 2024-02-17 DIAGNOSIS — Z01.818 PREOP TESTING: ICD-10-CM

## 2024-02-17 LAB
ALBUMIN SERPL BCP-MCNC: 4.5 G/DL (ref 3.5–5)
ALP SERPL-CCNC: 60 U/L (ref 34–104)
ALT SERPL W P-5'-P-CCNC: 18 U/L (ref 7–52)
ANION GAP SERPL CALCULATED.3IONS-SCNC: 6 MMOL/L
AST SERPL W P-5'-P-CCNC: 16 U/L (ref 13–39)
ATRIAL RATE: 51 BPM
BILIRUB SERPL-MCNC: 0.53 MG/DL (ref 0.2–1)
BUN SERPL-MCNC: 18 MG/DL (ref 5–25)
CALCIUM SERPL-MCNC: 10.1 MG/DL (ref 8.4–10.2)
CHLORIDE SERPL-SCNC: 102 MMOL/L (ref 96–108)
CHOLEST SERPL-MCNC: 279 MG/DL
CO2 SERPL-SCNC: 32 MMOL/L (ref 21–32)
CREAT SERPL-MCNC: 0.7 MG/DL (ref 0.6–1.3)
ERYTHROCYTE [DISTWIDTH] IN BLOOD BY AUTOMATED COUNT: 13 % (ref 11.6–15.1)
GFR SERPL CREATININE-BSD FRML MDRD: 102 ML/MIN/1.73SQ M
GLUCOSE P FAST SERPL-MCNC: 81 MG/DL (ref 65–99)
HCT VFR BLD AUTO: 49.6 % (ref 34.8–46.1)
HDLC SERPL-MCNC: 67 MG/DL
HGB BLD-MCNC: 16.7 G/DL (ref 11.5–15.4)
LDLC SERPL CALC-MCNC: 194 MG/DL (ref 0–100)
MCH RBC QN AUTO: 30.3 PG (ref 26.8–34.3)
MCHC RBC AUTO-ENTMCNC: 33.7 G/DL (ref 31.4–37.4)
MCV RBC AUTO: 90 FL (ref 82–98)
P AXIS: 18 DEGREES
PLATELET # BLD AUTO: 187 THOUSANDS/UL (ref 149–390)
PMV BLD AUTO: 9.8 FL (ref 8.9–12.7)
POTASSIUM SERPL-SCNC: 4 MMOL/L (ref 3.5–5.3)
PR INTERVAL: 144 MS
PROT SERPL-MCNC: 6.9 G/DL (ref 6.4–8.4)
QRS AXIS: 0 DEGREES
QRSD INTERVAL: 84 MS
QT INTERVAL: 432 MS
QTC INTERVAL: 398 MS
RBC # BLD AUTO: 5.52 MILLION/UL (ref 3.81–5.12)
SODIUM SERPL-SCNC: 140 MMOL/L (ref 135–147)
T WAVE AXIS: -5 DEGREES
TRIGL SERPL-MCNC: 89 MG/DL
VENTRICULAR RATE: 51 BPM
WBC # BLD AUTO: 5.51 THOUSAND/UL (ref 4.31–10.16)

## 2024-02-17 PROCEDURE — 93005 ELECTROCARDIOGRAM TRACING: CPT

## 2024-02-17 PROCEDURE — 36415 COLL VENOUS BLD VENIPUNCTURE: CPT

## 2024-02-17 PROCEDURE — 80061 LIPID PANEL: CPT

## 2024-02-17 PROCEDURE — 85027 COMPLETE CBC AUTOMATED: CPT

## 2024-02-17 PROCEDURE — 80053 COMPREHEN METABOLIC PANEL: CPT

## 2024-02-18 NOTE — POST OP PROGRESS NOTES
Progress Note - Neurosurgery   Gilles Cleaning 36 y o  female MRN: [id number]  Unit/Bed#: [facility] 834-01 Encounter: [id number]    Assessment:  1  POD16 removal of VPS (2/5/18)  2  Incisional drainage - resolved  3  History of ventriculoperitoneal shunt for idiopathic intracranial hypertension (originally placed May 30, 2017)  4  Left upper extremity occlusive thrombophlebitis cephalic vein  5  Peritonitis   6  Gastric perforation status post repair    Plan:  76-year-old female transfer [facility] for esophageal stent status post upper gastric injury during laparoscopic paraesophageal hernia repair  Patient developed sepsis and required multiple operative procedures  Upon transfer to [facility], she underwent a esophageal stent and externalization ventriculoperitoneal shunt which has since been completed removed  · Exam reveals with diffuse weakness throughout  Voice soft but appropriate  Interactive  Following commands  No focal findings  · Scalp Incision CDI without erythema edema or drainage  Dissolvable sutures in place  · Right chest incision clean dry intact following sutures placed 2/13/18  No erythema edema or drainage  No subcutaneous collections noted  Incision is flat  · Original interrupted sutures removed  Running suture from 2/13/18 in place - likely remove prior to discharge  · Pain control per primary team   · Ongoing medical management per primary team   Drain management  Trend white count and temperature profile  Afebrile, WBC 17 39 (2/21/18)  · Infectious disease following - continue on IV Unasyn and fluconazole another 9 days  Plan to transition to oral abx  · Mobilized out of bed  Encouraged mobilization out of bed  PT/OT - recommending rehab  · DVT PPX:  Heparin and SCDs on during exam   · No further neurosurgical intervention anticipated at this juncture  · Repeat CT head 2/15/18 for baseline following shunt removal  No acute intracranial abnormalities   S/p shunt removal  Ventricles increase from prior study 1/25/18  Prior slit like and now slightly larger and appropriate for age  · Patient may require lumbar puncture for opening and closing pressure if she develops any collections, further incisional drainage, headaches or vision changes  · Consider neurology evaluation for consideration of medical treatment for pseudotumor cerebri since shunt has been removed  · Will see as needed during remainder of hospitalization  Sign-off  · Call if any questions or concerns including headaches, vision changes or incisional drainage  Subjective/Objective   Chief Complaint: post op follow-up  Subjective: Patient c/o intermittent posterior mild non radiating headache without visual changes  C/o generalized weakness  Admits to hitting her head in the bathroom yesterday AM  No speech changes  No CP/SOB  Objective: sitting up in bed  NAD  I/O       02/19 0701 - 02/20 0700 02/20 0701 - 02/21 0700 02/21 0701 - 02/22 0700    P  O  0 0 0    NG/  695    IV Piggyback  100 100    Feedings  695     Total Intake(mL/kg) 520 (7 9) 795 (13 3) 795 (13 3)    Urine (mL/kg/hr) 2425 (1 5) 1668 (1 2) 1350 (2 2)    Drains 230 (0 1) 285 (0 2) 130 (0 2)    Stool       Total Output 2655 1953 1480    Net -2135 -1158 -685                 Invasive Devices     Peripherally Inserted Central Catheter Line            PICC Line 76/04/09 Right Basilic 31 days          Drain            Closed/Suction Drain Left Back Bulb 12 Fr  21 days    NG/OG/Enteral Tube Enteral Feeding Tube Right nares 21 days    Closed/Suction Drain Midline;Superior Abdomen Other (Comment) 10 Fr  4 days                Physical Exam:  Vitals: Blood pressure 154/88, pulse 94, temperature 98 3 °F (36 8 °C), temperature source Oral, resp  rate 18, height 5' 4" (1 626 m), weight 59 6 kg (131 lb 6 4 oz), SpO2 99 %, not currently breastfeeding  ,Body mass index is 22 55 kg/m²      General appearance: alert, appears stated age, cooperative and no distress  Head: Normocephalic, without obvious abnormality, incision CDI  Eyes: EOMI, PERRL  Neck: supple, symmetrical, trachea midline   Lungs: non labored breathing  Heart: regular heart rate  Neurologic:   Mental status: Alert, oriented, thought content appropriate  Cranial nerves: grossly intact (Cranial nerves II-XII)  Sensory: normal to LT  Motor: moving all extremities with generalized weakness  Coordination: finger to nose normal bilaterally, no drift bilaterally    Lab Results:    Results from last 7 days  Lab Units 02/21/18  0505 02/19/18  0456 02/18/18  0434   WBC Thousand/uL 17 39* 18 79* 16 04*   HEMOGLOBIN g/dL 8 8* 9 3* 9 5*   HEMATOCRIT % 28 6* 30 1* 30 6*   PLATELETS Thousands/uL 355 499* 527*   NEUTROS PCT % 83* 85* 83*   MONOS PCT % 5 6 6       Results from last 7 days  Lab Units 02/21/18  0505 02/19/18  0456 02/18/18  0434   SODIUM mmol/L 144 142 143   POTASSIUM mmol/L 3 2* 3 4* 3 8   CHLORIDE mmol/L 103 101 103   CO2 mmol/L 35* 35* 34*   BUN mg/dL 14 15 16   CREATININE mg/dL 0 36* 0 32* 0 34*   CALCIUM mg/dL 9 3 9 4 9 3   TOTAL PROTEIN g/dL 5 8*  --   --    BILIRUBIN TOTAL mg/dL 0 22  --   --    ALK PHOS U/L 83  --   --    ALT U/L 31  --   --    AST U/L 23  --   --    GLUCOSE RANDOM mg/dL 175* 177* 152*       Results from last 7 days  Lab Units 02/19/18  0456 02/18/18  0434 02/15/18  0515   MAGNESIUM mg/dL 2 1 2 1 2 0             No results found for: TROPONINT  ABG:  Lab Results   Component Value Date    PHART 7 484 (H) 01/25/2018    UKV1OJT 33 6 (L) 01/25/2018    PO2ART 165 1 (H) 01/25/2018    VYO9ZDZ 24 7 01/25/2018    BEART 1 6 01/25/2018    SOURCE Line, Arterial 01/25/2018       Imaging Studies: I have personally reviewed pertinent reports  and I have personally reviewed pertinent films in PACS    EKG, Pathology, and Other Studies: I have personally reviewed pertinent reports        VTE Pharmacologic Prophylaxis: Heparin    VTE Mechanical Prophylaxis: sequential compression device DISPLAY PLAN FREE TEXT

## 2024-02-19 DIAGNOSIS — E78.00 HYPERCHOLESTEREMIA: Primary | ICD-10-CM

## 2024-02-20 ENCOUNTER — CONSULT (OUTPATIENT)
Dept: INTERNAL MEDICINE CLINIC | Age: 49
End: 2024-02-20
Payer: MEDICARE

## 2024-02-20 VITALS
TEMPERATURE: 98.3 F | BODY MASS INDEX: 26.68 KG/M2 | SYSTOLIC BLOOD PRESSURE: 128 MMHG | HEIGHT: 63 IN | DIASTOLIC BLOOD PRESSURE: 76 MMHG | WEIGHT: 150.6 LBS | HEART RATE: 64 BPM | OXYGEN SATURATION: 98 %

## 2024-02-20 DIAGNOSIS — E78.00 HYPERCHOLESTEREMIA: ICD-10-CM

## 2024-02-20 DIAGNOSIS — Z01.818 PRE-OP EXAM: Primary | ICD-10-CM

## 2024-02-20 DIAGNOSIS — M89.8X7 EXOSTOSIS OF TOE: ICD-10-CM

## 2024-02-20 PROCEDURE — 99243 OFF/OP CNSLTJ NEW/EST LOW 30: CPT | Performed by: PHYSICIAN ASSISTANT

## 2024-02-20 NOTE — LETTER
February 20, 2024     Sri Herring, JENNIFER  [address] [facility]  [address]  [place] [address]    Patient: Shweta Nolan   YOB: 1975   Date of Visit: 2/20/2024       Dear Dr. Herring:    Thank you for referring Shweta Nolan to me for evaluation. Below are my notes for this consultation.    If you have questions, please do not hesitate to call me. I look forward to following your patient along with you.         Sincerely,        Adina Oconnell PA-C        CC: No Recipients    Adina Oconnell PA-C  2/20/2024  2:23 PM  Incomplete   Assessment/Plan:         Diagnoses and all orders for this visit:    Pre-op exam  Comments:  pt optimized for surgery  12 METS  will hold vitamins and supplements for 1 week prior    Exostosis of toe    Hypercholesteremia  Comments:  continue atorvastatin    Other orders  -     Nutritional Supplements (Menopause Formula) TABS; Take by mouth 2 gummies daily      Pt at low risk for proposed surgery     Subjective:      Patient ID: Shweta Nolan is a 48 y.o. female.    Presurgical Evaluation    Subjective:     Patient ID: Shweta Nolan is a 48 y.o. female.    Patient presents with:  Pre-op Exam: pre-op----Right 5th toe arthroplasty with Dr Herring on 2/29/24      The following portions of the patient's history were reviewed and updated as appropriate: allergies, current medications, past family history, past medical history, past social history, past surgical history, and problem list.    Procedure date: 2/29/24    Surgeon:  Dr Herring   Planned procedure:  R 5th toe arthroplasty   Diagnosis for procedure:  R toe pain, exostosis   Prior anesthesia: Yes   MAC; Complications:  None / Tolerated well    CAD History: None    Pulmonary History: None    Renal history: None    Diabetes History:  None     Neurological History: None     On Immunosuppressant meds/biologics: No    Preop labs/testing available and reviewed: yes    eGFR       Date                     Value               Ref Range            Status                2024               102                 ml/min/1.73sq m     Final            ----------  WBC       Date                     Value               Ref Range           Status                2024               5.51                4.31 - 10.16 T*     Final            ----------  Hemoglobin       Date                     Value               Ref Range           Status                2024               16.7 (H)            11.5 - 15.4 g/*     Final            ----------  Hematocrit       Date                     Value               Ref Range           Status                2024               49.6 (H)            34.8 - 46.1 %       Final            ----------  Platelets       Date                     Value               Ref Range           Status                2024               187                 149 - 390 Thou*     Final            ----------       EKG yes    Echo no    Stress test/cath no    PFT/[place] no    Functional capacity: Singles tennis                       6 Mets   Pick the highest level patient can comfortably perform   4 mets or greater for surgery    RCRI  High Risk surgery?         1 Point  CAD History:         1 Point   MI; Positive Stress Test; CP due to Mi;  Nitrate Usage to control Angina; Pathologic Q wave on EKG  CHF Active:         1 Point   Pulm Edema; Paroxysmal Nocturnal Dyspnea;  Bibasilar Rales (crackles);S3; CHF on CXR  Cerebrovascular Disease (TIA or CVA):     1 Point  DM on Insulin:        1 Point  Serum Creat >2.0 mg/dl:       1 Point          Total Points: 0     Scorin: Class I, Very Low Risk (0.4%)     1: Class II, Low risk (0.9%)     2: Class III Moderate (6.6%)     3: Class IV High (>11%)      ALESHIA Risk:  GFR: eGFR       Date                     Value               Ref Range           Status                2024               102                 ml/min/1.73sq m     Final            ----------      For PCP:  If GFR>60, Hold  ACE/ARB/Diuretic on the day of surgery, and NSAIDS 10 days before.    If GFR<45, Consider PRE and POST op Nephrology Consult.    If 46 <GFR> 59 : Has Patient had ALESHIA in last 6 Months? no   If YES: Preop Nephrology consult   If No:  Post Op Nephrology consult.                       The following portions of the patient's history were reviewed and updated as appropriate: allergies, current medications, past family history, past medical history, past social history, past surgical history, and problem list.    Review of Systems   Constitutional:  Positive for diaphoresis. Negative for activity change, appetite change, chills, fatigue and fever.   HENT:  Negative for congestion, sinus pressure and sore throat.    Eyes:  Negative for pain and redness.   Respiratory:  Negative for cough, shortness of breath and wheezing.    Cardiovascular:  Negative for chest pain, palpitations and leg swelling.   Gastrointestinal:  Negative for abdominal pain, constipation and diarrhea.   Genitourinary:  Negative for dysuria.   Musculoskeletal:  Negative for arthralgias, back pain and gait problem.   Skin:  Negative for rash.   Neurological:  Negative for dizziness and headaches.   Psychiatric/Behavioral:  Negative for sleep disturbance. The patient is not nervous/anxious.          Past Medical History:   Diagnosis Date   • Abdominal pain    • Acute cystitis with hematuria 01/03/2020   • Brain condition     Pseudotumor Cerebri    • Chronic kidney disease    • COVID-19 virus infection 11/10/2022   • De Quervain's disease (tenosynovitis)    • Esophageal perforation    • Gastric leak    • GERD (gastroesophageal reflux disease)    • Headache    • Idiopathic intracranial hypertension    • Kidney stone    • Moderate protein-calorie malnutrition (HCC) 02/21/2018   • No blood products     per pt: personal and Shinto beliefs. surgeon office aware 12/13/19   • Papilledema, both eyes    • PONV (postoperative nausea and vomiting)    •  Postgastrectomy malabsorption    • Presence of lumboperitoneal shunt     Resolved: Sep 20, 2017   • Rotator cuff tendinitis     Resolved: Aug 23, 2017   • Visual field defect          Current Outpatient Medications:   •  atorvastatin (LIPITOR) 10 mg tablet, Take 1 tablet (10 mg total) by mouth daily at bedtime, Disp: 90 tablet, Rfl: 1  •  butalbital-acetaminophen-caffeine (FIORICET,ESGIC) -40 mg per tablet, take 1 tablet by mouth every 6 hours if needed for headache or migraines -ONLY 10 PER MONTH, Disp: 10 tablet, Rfl: 3  •  dexlansoprazole (DEXILANT) 60 MG capsule, Take 1 capsule (60 mg total) by mouth daily, Disp: 90 capsule, Rfl: 3  •  Multiple Vitamin (MULTIVITAMIN) tablet, Take 1 tablet by mouth daily Wears a patch, Disp: , Rfl:   •  naloxone (NARCAN) 4 mg/0.1 mL nasal spray, Administer 1 spray into a nostril. If no response after 2-3 minutes, give another dose in the other nostril using a new spray., Disp: 1 each, Rfl: 1  •  Nutritional Supplements (Menopause Formula) TABS, Take by mouth 2 gummies daily, Disp: , Rfl:   •  Biotin 1 MG CAPS, Take 1 mg by mouth daily   (Patient not taking: Reported on 2/20/2024), Disp: , Rfl:   •  calcium citrate-vitamin D (CITRACAL+D) 315-200 MG-UNIT per tablet, Take 1 tablet by mouth 2 (two) times a day (Patient not taking: Reported on 2/20/2024), Disp: , Rfl:     Allergies   Allergen Reactions   • Benadryl [Diphenhydramine] Anaphylaxis     Throat closing   • Phenergan [Promethazine] Anaphylaxis   • Nsaids Other (See Comments)     Avoids due to Hx Gastric Sleeve       Social History  Past Surgical History:   Procedure Laterality Date   • BONE EXOSTOSIS EXCISION Right 11/16/2023    Procedure: EXCISION EXOSTOSIS 5TH TOE RIght;  Surgeon: Sri Herring DPM;  [facility];  Service: Podiatry   • BREAST BIOPSY Left 1993    benign   • CSF SHUNT      LP shunt - 2015 -  shunt - 2017   • ESOPHAGOGASTRODUODENOSCOPY N/A 02/23/2018    Procedure: ESOPHAGOGASTRODUODENOSCOPY  "(EGD) WITH ESOPHAGEAL STENT PLACEMENT;  Surgeon: Sergio hCung MD;  [facility];  Service: Thoracic   • ESOPHAGOGASTRODUODENOSCOPY N/A 02/23/2018    Procedure: ESOPHAGOGASTRODUODENOSCOPY (EGD) WITH REMOVAL ESOPHAGEAL STENT  AND REPLACEMENT WITH 23mm X155mm WALLFLEX ESOPHAGEAL STENT;  Surgeon: Sergio Chung MD;  [facility];  Service: Thoracic   • ESOPHAGOGASTRODUODENOSCOPY N/A 03/28/2018    Procedure: ESOPHAGOGASTRODUODENOSCOPY (EGD) with PEJ placement.;  Surgeon: Andrea Gonzales MD;  [facility];  Service: Gastroenterology   • ESOPHAGOGASTRODUODENOSCOPY N/A 04/05/2018    Procedure: ESOPHAGOGASTRODUODENOSCOPY (EGD) with botox injection and kaofed placement;  Surgeon: Naomie Leroy DO;  [facility];  Service: Gastroenterology   • ESOPHAGOGASTRODUODENOSCOPY N/A 04/10/2018    Procedure: ESOPHAGOGASTRODUODENOSCOPY (EGD) with Kaofed placement;  Surgeon: Saroj Kirkland MD;  [facility];  Service: Gastroenterology   • ESOPHAGOSCOPY WITH STENT INSERTION N/A 01/24/2018    Procedure: INSERTION STENT ESOPHAGEAL;  Surgeon: Gonsalo Yang MD;  [facility];  Service: Gastroenterology   • EYE SURGERY Bilateral 1980    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)    • FL RETROGRADE PYELOGRAM  06/24/2020   • FL RETROGRADE PYELOGRAM  08/21/2021   • FL RETROGRADE PYELOGRAM  5/4/2023   • FL RETROGRADE PYELOGRAM  5/18/2023   • GASTRIC BYPASS  12/19/2016    Lap sleeve gastrectomy w/shunt length shortening procedure   • HIATAL HERNIA REPAIR     • HYSTERECTOMY  03/14/2013   • IR LUMBAR PUNCTURE  08/29/2018   • LAPAROTOMY N/A 01/25/2018    Procedure: Exploratory Laparotomy, wash out,placement of drains, placement of NG feeding tube ;  Surgeon: Ruperto Jasso DO;  [facility];  Service: General   • LUMBAR PERITONEAL SHUNT  11/19/2015    Laparoscopic assisted   • DC BREAST REDUCTION Bilateral 03/25/2022    Procedure: BILATERAL BREAST REDUCTION;  Surgeon: Indio Williamson" MD;  [facility];  Service: Plastics   • LA CRTJ SHUNT [id number] Right 12/18/2019    Procedure: IMAGE GUIDED INSERTION OF RIGHT CORONAL VENTRICULAR-ATRIAL SHUNT;  Surgeon: Edouard Pires MD;  [facility];  Service: Neurosurgery   • LA CRTJ SHUNT [id number] TERMINUS Right 05/31/2017    Procedure: IMAGE GUIDED CORONAL PLACEMENT OF PROGRAMABLE VENTRICULAR-PERITONEAL SHUNT, REMOVAL OF LP SHUNT ;  Surgeon: Edouard Pires MD;  [facility];  Service: Neurosurgery   • LA CYSTO BLADDER W/URETERAL CATHETERIZATION N/A 06/06/2020    Procedure: Bilateral CYSTOSCOPY RETROGRADE PYELOGRAM WITH INSERTION STENT URETERAL;  Surgeon: Moe Mcgrath MD;  [facility];  Service: Urology   • LA CYSTO BLADDER W/URETERAL CATHETERIZATION Bilateral 5/4/2023    Procedure: CYSTOSCOPY RETROGRADE PYELOGRAM WITH INSERTION STENT URETERAL;  Surgeon: Moe Mcgrath MD;  [facility];  Service: Urology   • LA CYSTO/URETERO W/LITHOTRIPSY &INDWELL STENT INSRT Bilateral 06/24/2020    Procedure: CYSTOSCOPY URETEROSCOPY WITH LITHOTRIPSY HOLMIUM LASER, RETROGRADE PYELOGRAM AND exchange bilateral  STENTs URETERAL;  Surgeon: Mark Godfrey MD;  [facility];  Service: Urology   • LA CYSTO/URETERO W/LITHOTRIPSY &INDWELL STENT INSRT Left 08/21/2021    Procedure: CYSTOSCOPY; LEFT URETEROSCOPY WITH RETROGRADE PYELOGRAM, REMOVAL OF STONE AND INSERTION LEFT URETERAL STENT;  Surgeon: Jamie Nuñez MD;  [facility];  Service: Urology   • LA CYSTO/URETERO W/LITHOTRIPSY &INDWELL STENT INSRT N/A 5/18/2023    Procedure: CYSTOSCOPY URETEROSCOPY WITH LITHOTRIPSY HOLMIUM LASER, RETROGRADE PYELOGRAM, REMOVAL OF BILATERAL STENTS,  AND INSERTION STENT URETERAL LEFT ,BASKET EXTRACTION OF STONE LEFT SIDE;  Surgeon: Jamie Nuñez MD;  [facility];  Service: Urology   • LA EGD TRANSORAL BIOPSY SINGLE/MULTIPLE N/A 06/27/2018    Procedure:  "ESOPHAGOGASTRODUODENOSCOPY (EGD) with padlock clip placement;  Surgeon: Lisbet Gonzalez MD;  [facility];  Service: Bariatrics   • NM RMVL COMPL CSF SHUNT SYSTEM W/O RPLCMT SHUNT Right 02/05/2018    Procedure: Removal of  shunt;  Surgeon: Edouard Pires MD;  [facility];  Service: Neurosurgery   • NM RPLCMT/REVJ CSF SHUNT VALVE/CATH SHUNT SYS Right 01/25/2018    Procedure: Externalization of right-sided SHUNT VENTRICULAR-PERITONEAL in anterior chest wall ribs two and three level  ;  Surgeon: Miquel Duarte MD;  [facility];  Service: Neurosurgery   • REDUCTION MAMMAPLASTY Bilateral    • WRIST SURGERY Right     x3 2006, 2008     Family History   Problem Relation Age of Onset   • Kidney cancer Mother 66   • No Known Problems Father    • No Known Problems Sister    • No Known Problems Daughter    • No Known Problems Maternal Grandmother    • Colon cancer Maternal Grandfather 39   • No Known Problems Paternal Grandmother    • Cancer Paternal Grandfather    • Thyroid cancer Brother 40   • No Known Problems Maternal Aunt    • No Known Problems Maternal Aunt    • No Known Problems Paternal Aunt    • Cancer Family         Gastric   • Leukemia Family    • Colon cancer Family    • Pancreatic cancer Family    • Lung cancer Maternal Uncle 61       Objective:  /76 (BP Location: Left arm, Patient Position: Sitting, Cuff Size: Standard)   Pulse 64   Temp 98.3 °F (36.8 °C) (Temporal)   Ht 5' 3\" (1.6 m)   Wt 68.3 kg (150 lb 9.6 oz)   LMP  (LMP Unknown)   SpO2 98%   BMI 26.68 kg/m²        Physical Exam  Vitals reviewed.   Constitutional:       General: She is not in acute distress.  HENT:      Head: Normocephalic and atraumatic.      Right Ear: Tympanic membrane, ear canal and external ear normal.      Left Ear: Tympanic membrane, ear canal and external ear normal.      Nose: Nose normal.      Mouth/Throat:      Mouth: Mucous membranes are moist.   Eyes:      General:         Right eye: No " discharge.         Left eye: No discharge.      Extraocular Movements: Extraocular movements intact.      Conjunctiva/sclera: Conjunctivae normal.      Pupils: Pupils are equal, round, and reactive to light.   Cardiovascular:      Rate and Rhythm: Normal rate and regular rhythm.   Pulmonary:      Effort: Pulmonary effort is normal. No respiratory distress.      Breath sounds: Normal breath sounds. No wheezing or rales.   Abdominal:      General: Bowel sounds are normal. There is no distension.   Musculoskeletal:      Cervical back: Normal range of motion.      Right lower leg: No edema.      Left lower leg: No edema.   Skin:     General: Skin is warm.      Findings: No erythema or rash.   Neurological:      General: No focal deficit present.      Mental Status: She is alert and oriented to person, place, and time.      Cranial Nerves: No cranial nerve deficit.   Psychiatric:         Mood and Affect: Mood normal.         Behavior: Behavior normal.           Adina Oconnell PA-C  2/20/2024  1:57 PM  Sign when Signing Visit   Assessment/Plan:    No problem-specific Assessment & Plan notes found for this encounter.       There are no diagnoses linked to this encounter.      Subjective:      Patient ID: Shweta Nolan is a 48 y.o. female.    Presurgical Evaluation    Subjective:     Patient ID: Shweta Nolan is a 48 y.o. female.    Patient presents with:  Pre-op Exam: pre-op----Right 5th toe arthroplasty with Dr Herring on 2/29/24      The following portions of the patient's history were reviewed and updated as appropriate: allergies, current medications, past family history, past medical history, past social history, past surgical history, and problem list.    Procedure date: 2/29/24    Surgeon:  Dr Herring   Planned procedure:  R 5th toe arthroplasty   Diagnosis for procedure:  R toe pain, exostosis   Prior anesthesia: Yes   MAC; Complications:  None / Tolerated well    CAD History: None    Pulmonary History:  None    Renal history: None    Diabetes History:  None     Neurological History: None     On Immunosuppressant meds/biologics: No    Preop labs/testing available and reviewed: yes    eGFR       Date                     Value               Ref Range           Status                2024               102                 ml/min/1.73sq m     Final            ----------  WBC       Date                     Value               Ref Range           Status                2024               5.51                4.31 - 10.16 T*     Final            ----------  Hemoglobin       Date                     Value               Ref Range           Status                2024               16.7 (H)            11.5 - 15.4 g/*     Final            ----------  Hematocrit       Date                     Value               Ref Range           Status                2024               49.6 (H)            34.8 - 46.1 %       Final            ----------  Platelets       Date                     Value               Ref Range           Status                2024               187                 149 - 390 Thou*     Final            ----------       EKG yes    Echo no    Stress test/cath no    PFT/[place] no    Functional capacity: Singles tennis                       7-12 Mets   Pick the highest level patient can comfortably perform   4 mets or greater for surgery    RCRI  High Risk surgery?         1 Point  CAD History:         1 Point   MI; Positive Stress Test; CP due to Mi;  Nitrate Usage to control Angina; Pathologic Q wave on EKG  CHF Active:         1 Point   Pulm Edema; Paroxysmal Nocturnal Dyspnea;  Bibasilar Rales (crackles);S3; CHF on CXR  Cerebrovascular Disease (TIA or CVA):     1 Point  DM on Insulin:        1 Point  Serum Creat >2.0 mg/dl:       1 Point          Total Points: 0     Scorin: Class I, Very Low Risk (0.4%)     1: Class II, Low risk (0.9%)     2: Class III Moderate (6.6%)     3: Class IV  High (>11%)      ALESHIA Risk:  GFR: eGFR       Date                     Value               Ref Range           Status                02/17/2024               102                 ml/min/1.73sq m     Final            ----------      For PCP:  If GFR>60, Hold ACE/ARB/Diuretic on the day of surgery, and NSAIDS 10 days before.    If GFR<45, Consider PRE and POST op Nephrology Consult.    If 46 <GFR> 59 : Has Patient had ALESHIA in last 6 Months? no   If YES: Preop Nephrology consult   If No:  Post Op Nephrology consult.                       The following portions of the patient's history were reviewed and updated as appropriate: allergies, current medications, past family history, past medical history, past social history, past surgical history, and problem list.    Review of Systems   Constitutional:  Positive for diaphoresis. Negative for activity change, appetite change, chills, fatigue and fever.   HENT:  Negative for congestion, sinus pressure and sore throat.    Eyes:  Negative for pain and redness.   Respiratory:  Negative for cough, shortness of breath and wheezing.    Cardiovascular:  Negative for chest pain, palpitations and leg swelling.   Gastrointestinal:  Negative for abdominal pain, constipation and diarrhea.   Genitourinary:  Negative for dysuria.   Musculoskeletal:  Negative for arthralgias, back pain and gait problem.   Skin:  Negative for rash.   Neurological:  Negative for dizziness and headaches.   Psychiatric/Behavioral:  Negative for sleep disturbance. The patient is not nervous/anxious.          Past Medical History:   Diagnosis Date   • Abdominal pain    • Acute cystitis with hematuria 01/03/2020   • Brain condition     Pseudotumor Cerebri    • Chronic kidney disease    • COVID-19 virus infection 11/10/2022   • De Quervain's disease (tenosynovitis)    • Esophageal perforation    • Gastric leak    • GERD (gastroesophageal reflux disease)    • Headache    • Idiopathic intracranial hypertension    •  Kidney stone    • Moderate protein-calorie malnutrition (HCC) 02/21/2018   • No blood products     per pt: personal and Scientology beliefs. surgeon office aware 12/13/19   • Papilledema, both eyes    • PONV (postoperative nausea and vomiting)    • Postgastrectomy malabsorption    • Presence of lumboperitoneal shunt     Resolved: Sep 20, 2017   • Rotator cuff tendinitis     Resolved: Aug 23, 2017   • Visual field defect          Current Outpatient Medications:   •  atorvastatin (LIPITOR) 10 mg tablet, Take 1 tablet (10 mg total) by mouth daily at bedtime, Disp: 90 tablet, Rfl: 1  •  butalbital-acetaminophen-caffeine (FIORICET,ESGIC) -40 mg per tablet, take 1 tablet by mouth every 6 hours if needed for headache or migraines -ONLY 10 PER MONTH, Disp: 10 tablet, Rfl: 3  •  dexlansoprazole (DEXILANT) 60 MG capsule, Take 1 capsule (60 mg total) by mouth daily, Disp: 90 capsule, Rfl: 3  •  Multiple Vitamin (MULTIVITAMIN) tablet, Take 1 tablet by mouth daily Wears a patch, Disp: , Rfl:   •  naloxone (NARCAN) 4 mg/0.1 mL nasal spray, Administer 1 spray into a nostril. If no response after 2-3 minutes, give another dose in the other nostril using a new spray., Disp: 1 each, Rfl: 1  •  Nutritional Supplements (Menopause Formula) TABS, Take by mouth 2 gummies daily, Disp: , Rfl:   •  ascorbic acid (VITAMIN C) 250 MG CHEW, Chew 250 mg daily (Patient not taking: Reported on 2/20/2024), Disp: , Rfl:   •  Biotin 1 MG CAPS, Take 1 mg by mouth daily   (Patient not taking: Reported on 2/20/2024), Disp: , Rfl:   •  calcium citrate-vitamin D (CITRACAL+D) 315-200 MG-UNIT per tablet, Take 1 tablet by mouth 2 (two) times a day (Patient not taking: Reported on 2/20/2024), Disp: , Rfl:   •  ferrous gluconate 324 (37.5 Fe) mg, Take 324 mg by mouth 2 (two) times a day before meals (Patient not taking: Reported on 2/20/2024), Disp: , Rfl:   •  folic acid (FOLVITE) 1 mg tablet, Take 2 tablets (2 mg total) by mouth daily (Patient not taking:  Reported on 2/20/2024), Disp: 180 tablet, Rfl: 3  •  Multiple Vitamins-Minerals (Hair/Skin/Nails) CAPS, Take 1 tablet by mouth 2 (two) times a day   (Patient not taking: Reported on 2/20/2024), Disp: , Rfl:   •  vitamin B-12 (VITAMIN B-12) 500 mcg tablet, Take 500 mcg by mouth daily (Patient not taking: Reported on 2/20/2024), Disp: , Rfl:     Allergies   Allergen Reactions   • Benadryl [Diphenhydramine] Anaphylaxis     Throat closing   • Phenergan [Promethazine] Anaphylaxis   • Nsaids Other (See Comments)     Avoids due to Hx Gastric Sleeve       Social History  Past Surgical History:   Procedure Laterality Date   • BONE EXOSTOSIS EXCISION Right 11/16/2023    Procedure: EXCISION EXOSTOSIS 5TH TOE RIght;  Surgeon: Sri Herring DPM;  [facility];  Service: Podiatry   • BREAST BIOPSY Left 1993    benign   • CSF SHUNT      LP shunt - 2015 -  shunt - 2017   • ESOPHAGOGASTRODUODENOSCOPY N/A 02/23/2018    Procedure: ESOPHAGOGASTRODUODENOSCOPY (EGD) WITH ESOPHAGEAL STENT PLACEMENT;  Surgeon: Sergio Chung MD;  [facility];  Service: Thoracic   • ESOPHAGOGASTRODUODENOSCOPY N/A 02/23/2018    Procedure: ESOPHAGOGASTRODUODENOSCOPY (EGD) WITH REMOVAL ESOPHAGEAL STENT  AND REPLACEMENT WITH 23mm X155mm WALLFLEX ESOPHAGEAL STENT;  Surgeon: Sergio Chung MD;  [facility];  Service: Thoracic   • ESOPHAGOGASTRODUODENOSCOPY N/A 03/28/2018    Procedure: ESOPHAGOGASTRODUODENOSCOPY (EGD) with PEJ placement.;  Surgeon: Andrea Gonzales MD;  [facility];  Service: Gastroenterology   • ESOPHAGOGASTRODUODENOSCOPY N/A 04/05/2018    Procedure: ESOPHAGOGASTRODUODENOSCOPY (EGD) with botox injection and kaofed placement;  Surgeon: Naomie Leroy DO;  [facility];  Service: Gastroenterology   • ESOPHAGOGASTRODUODENOSCOPY N/A 04/10/2018    Procedure: ESOPHAGOGASTRODUODENOSCOPY (EGD) with Kaofed placement;  Surgeon: Saroj Kirkland MD;  [facility];  Service: Gastroenterology   • ESOPHAGOSCOPY  "WITH STENT INSERTION N/A 01/24/2018    Procedure: INSERTION STENT ESOPHAGEAL;  Surgeon: Gonsalo Yang MD;  [facility];  Service: Gastroenterology   • EYE SURGERY Bilateral 1980    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)    • FL RETROGRADE PYELOGRAM  06/24/2020   • FL RETROGRADE PYELOGRAM  08/21/2021   • FL RETROGRADE PYELOGRAM  5/4/2023   • FL RETROGRADE PYELOGRAM  5/18/2023   • GASTRIC BYPASS  12/19/2016    Lap sleeve gastrectomy w/shunt length shortening procedure   • HIATAL HERNIA REPAIR     • HYSTERECTOMY  03/14/2013   • IR LUMBAR PUNCTURE  08/29/2018   • LAPAROTOMY N/A 01/25/2018    Procedure: Exploratory Laparotomy, wash out,placement of drains, placement of NG feeding tube ;  Surgeon: Ruperto Jasso DO;  [facility];  Service: General   • LUMBAR PERITONEAL SHUNT  11/19/2015    Laparoscopic assisted   • FL BREAST REDUCTION Bilateral 03/25/2022    Procedure: BILATERAL BREAST REDUCTION;  Surgeon: Indio Williamson MD;  [facility];  Service: Plastics   • FL CRTJ SHUNT [id number] Right 12/18/2019    Procedure: IMAGE GUIDED INSERTION OF RIGHT CORONAL VENTRICULAR-ATRIAL SHUNT;  Surgeon: Edouard Pires MD;  [facility];  Service: Neurosurgery   • FL CRTJ SHUNT [id number] TERMINUS Right 05/31/2017    Procedure: IMAGE GUIDED CORONAL PLACEMENT OF PROGRAMABLE VENTRICULAR-PERITONEAL SHUNT, REMOVAL OF LP SHUNT ;  Surgeon: Edouard Pires MD;  [facility];  Service: Neurosurgery   • FL CYSTO BLADDER W/URETERAL CATHETERIZATION N/A 06/06/2020    Procedure: Bilateral CYSTOSCOPY RETROGRADE PYELOGRAM WITH INSERTION STENT URETERAL;  Surgeon: Moe Mcgrath MD;  Location: [facility];  Service: Urology   • FL CYSTO BLADDER W/URETERAL CATHETERIZATION Bilateral 5/4/2023    Procedure: CYSTOSCOPY RETROGRADE PYELOGRAM WITH INSERTION STENT URETERAL;  Surgeon: Moe Mcgrath MD;  [facility];  Service: Urology   • FL CYSTO/URETERO " W/LITHOTRIPSY &INDWELL STENT INSRT Bilateral 06/24/2020    Procedure: CYSTOSCOPY URETEROSCOPY WITH LITHOTRIPSY HOLMIUM LASER, RETROGRADE PYELOGRAM AND exchange bilateral  STENTs URETERAL;  Surgeon: aMrk Godfrey MD;  [facility];  Service: Urology   • LA CYSTO/URETERO W/LITHOTRIPSY &INDWELL STENT INSRT Left 08/21/2021    Procedure: CYSTOSCOPY; LEFT URETEROSCOPY WITH RETROGRADE PYELOGRAM, REMOVAL OF STONE AND INSERTION LEFT URETERAL STENT;  Surgeon: Jamie Nuñez MD;  [facility];  Service: Urology   • LA CYSTO/URETERO W/LITHOTRIPSY &INDWELL STENT INSRT N/A 5/18/2023    Procedure: CYSTOSCOPY URETEROSCOPY WITH LITHOTRIPSY HOLMIUM LASER, RETROGRADE PYELOGRAM, REMOVAL OF BILATERAL STENTS,  AND INSERTION STENT URETERAL LEFT ,BASKET EXTRACTION OF STONE LEFT SIDE;  Surgeon: Jamie Nuñez MD;  [facility];  Service: Urology   • LA EGD TRANSORAL BIOPSY SINGLE/MULTIPLE N/A 06/27/2018    Procedure: ESOPHAGOGASTRODUODENOSCOPY (EGD) with padlock clip placement;  Surgeon: Lisbet Gonzalez MD;  [facility];  Service: Bariatrics   • LA RMVL COMPL CSF SHUNT SYSTEM W/O RPLCMT SHUNT Right 02/05/2018    Procedure: Removal of  shunt;  Surgeon: Edouard Pires MD;  [facility];  Service: Neurosurgery   • LA RPLCMT/REVJ CSF SHUNT VALVE/CATH SHUNT SYS Right 01/25/2018    Procedure: Externalization of right-sided SHUNT VENTRICULAR-PERITONEAL in anterior chest wall ribs two and three level  ;  Surgeon: Miquel Duarte MD;  [facility];  Service: Neurosurgery   • REDUCTION MAMMAPLASTY Bilateral    • WRIST SURGERY Right     x3 2006, 2008     Family History   Problem Relation Age of Onset   • Kidney cancer Mother 66   • No Known Problems Father    • No Known Problems Sister    • No Known Problems Daughter    • No Known Problems Maternal Grandmother    • Colon cancer Maternal Grandfather 39   • No Known Problems Paternal Grandmother    • Cancer Paternal Grandfather    •  "Thyroid cancer Brother 40   • No Known Problems Maternal Aunt    • No Known Problems Maternal Aunt    • No Known Problems Paternal Aunt    • Cancer Family         Gastric   • Leukemia Family    • Colon cancer Family    • Pancreatic cancer Family    • Lung cancer Maternal Uncle 61       Objective:  /76 (BP Location: Left arm, Patient Position: Sitting, Cuff Size: Standard)   Pulse 64   Temp 98.3 °F (36.8 °C) (Temporal)   Ht 5' 3\" (1.6 m)   Wt 68.3 kg (150 lb 9.6 oz)   LMP  (LMP Unknown)   SpO2 98%   BMI 26.68 kg/m²        Physical Exam  Vitals reviewed.   Constitutional:       General: She is not in acute distress.  Cardiovascular:      Rate and Rhythm: Normal rate and regular rhythm.   Neurological:      Mental Status: She is alert.         "

## 2024-02-20 NOTE — PROGRESS NOTES
Assessment/Plan:         Diagnoses and all orders for this visit:    Pre-op exam  Comments:  pt optimized for surgery  6 METS  will hold vitamins and supplements for 1 week prior    Exostosis of toe    Hypercholesteremia  Comments:  continue atorvastatin    Other orders  -     Nutritional Supplements (Menopause Formula) TABS; Take by mouth 2 gummies daily        patient is scheduled excision/revision exostosis of the right 5th toe by Dr Sri Herring on 2/29/24  function at 6 Mets of physical activity daily  Lab results have been reviewed  her revised cardiac risk index by Ata criteria shows low risk   Pt should inform her PCP and her surgeon if her clinical status should change prior to surgery      Subjective:      Patient ID: Shweta Nolan is a 48 y.o. female.    Presurgical Evaluation    Subjective:     Patient ID: Shweta Nolan is a 48 y.o. female.    Patient presents with:  Pre-op Exam: pre-op----Right 5th toe arthroplasty with Dr Herring on 2/29/24      The following portions of the patient's history were reviewed and updated as appropriate: allergies, current medications, past family history, past medical history, past social history, past surgical history, and problem list.    Procedure date: 2/29/24    Surgeon:  Dr Herring   Planned procedure:  R 5th toe arthroplasty   Diagnosis for procedure:  R toe pain, exostosis   Prior anesthesia: Yes   MAC; Complications:  None / Tolerated well    CAD History: None    Pulmonary History: None    Renal history: None    Diabetes History:  None     Neurological History: None     On Immunosuppressant meds/biologics: No    Preop labs/testing available and reviewed: yes    eGFR       Date                     Value               Ref Range           Status                02/17/2024               102                 ml/min/1.73sq m     Final            ----------  WBC       Date                     Value               Ref Range           Status                02/17/2024                5.51                4.31 - 10.16 T*     Final            ----------  Hemoglobin       Date                     Value               Ref Range           Status                2024               16.7 (H)            11.5 - 15.4 g/*     Final            ----------  Hematocrit       Date                     Value               Ref Range           Status                2024               49.6 (H)            34.8 - 46.1 %       Final            ----------  Platelets       Date                     Value               Ref Range           Status                2024               187                 149 - 390 Thou*     Final            ----------       EKG yes    Echo no    Stress test/cath no    PFT/Gavin no    Functional capacity: Singles tennis                       6 Mets   Pick the highest level patient can comfortably perform   4 mets or greater for surgery    RCRI  High Risk surgery?         1 Point  CAD History:         1 Point   MI; Positive Stress Test; CP due to Mi;  Nitrate Usage to control Angina; Pathologic Q wave on EKG  CHF Active:         1 Point   Pulm Edema; Paroxysmal Nocturnal Dyspnea;  Bibasilar Rales (crackles);S3; CHF on CXR  Cerebrovascular Disease (TIA or CVA):     1 Point  DM on Insulin:        1 Point  Serum Creat >2.0 mg/dl:       1 Point          Total Points: 0     Scorin: Class I, Very Low Risk (0.4%)     1: Class II, Low risk (0.9%)     2: Class III Moderate (6.6%)     3: Class IV High (>11%)      ALESHIA Risk:  GFR: eGFR       Date                     Value               Ref Range           Status                2024               102                 ml/min/1.73sq m     Final            ----------      For PCP:  If GFR>60, Hold ACE/ARB/Diuretic on the day of surgery, and NSAIDS 10 days before.    If GFR<45, Consider PRE and POST op Nephrology Consult.    If 46 <GFR> 59 : Has Patient had ALESHIA in last 6 Months? no   If YES: Preop Nephrology consult   If No:  Post Op  Nephrology consult.                       The following portions of the patient's history were reviewed and updated as appropriate: allergies, current medications, past family history, past medical history, past social history, past surgical history, and problem list.    Review of Systems   Constitutional:  Positive for diaphoresis. Negative for activity change, appetite change, chills, fatigue and fever.   HENT:  Negative for congestion, sinus pressure and sore throat.    Eyes:  Negative for pain and redness.   Respiratory:  Negative for cough, shortness of breath and wheezing.    Cardiovascular:  Negative for chest pain, palpitations and leg swelling.   Gastrointestinal:  Negative for abdominal pain, constipation and diarrhea.   Genitourinary:  Negative for dysuria.   Musculoskeletal:  Negative for arthralgias, back pain and gait problem.   Skin:  Negative for rash.   Neurological:  Negative for dizziness and headaches.   Psychiatric/Behavioral:  Negative for sleep disturbance. The patient is not nervous/anxious.          Past Medical History:   Diagnosis Date    Abdominal pain     Acute cystitis with hematuria 01/03/2020    Brain condition     Pseudotumor Cerebri     Chronic kidney disease     COVID-19 virus infection 11/10/2022    De Quervain's disease (tenosynovitis)     Esophageal perforation     Gastric leak     GERD (gastroesophageal reflux disease)     Headache     Idiopathic intracranial hypertension     Kidney stone     Moderate protein-calorie malnutrition (HCC) 02/21/2018    No blood products     per pt: personal and Sabianism beliefs. surgeon office aware 12/13/19    Papilledema, both eyes     PONV (postoperative nausea and vomiting)     Postgastrectomy malabsorption     Presence of lumboperitoneal shunt     Resolved: Sep 20, 2017    Rotator cuff tendinitis     Resolved: Aug 23, 2017    Visual field defect          Current Outpatient Medications:     atorvastatin (LIPITOR) 10 mg tablet, Take 1 tablet  (10 mg total) by mouth daily at bedtime, Disp: 90 tablet, Rfl: 1    butalbital-acetaminophen-caffeine (FIORICET,ESGIC) -40 mg per tablet, take 1 tablet by mouth every 6 hours if needed for headache or migraines -ONLY 10 PER MONTH, Disp: 10 tablet, Rfl: 3    dexlansoprazole (DEXILANT) 60 MG capsule, Take 1 capsule (60 mg total) by mouth daily, Disp: 90 capsule, Rfl: 3    Multiple Vitamin (MULTIVITAMIN) tablet, Take 1 tablet by mouth daily Wears a patch, Disp: , Rfl:     naloxone (NARCAN) 4 mg/0.1 mL nasal spray, Administer 1 spray into a nostril. If no response after 2-3 minutes, give another dose in the other nostril using a new spray., Disp: 1 each, Rfl: 1    Nutritional Supplements (Menopause Formula) TABS, Take by mouth 2 gummies daily, Disp: , Rfl:     Biotin 1 MG CAPS, Take 1 mg by mouth daily   (Patient not taking: Reported on 2/20/2024), Disp: , Rfl:     calcium citrate-vitamin D (CITRACAL+D) 315-200 MG-UNIT per tablet, Take 1 tablet by mouth 2 (two) times a day (Patient not taking: Reported on 2/20/2024), Disp: , Rfl:     Allergies   Allergen Reactions    Benadryl [Diphenhydramine] Anaphylaxis     Throat closing    Phenergan [Promethazine] Anaphylaxis    Nsaids Other (See Comments)     Avoids due to Hx Gastric Sleeve       Social History   Past Surgical History:   Procedure Laterality Date    BONE EXOSTOSIS EXCISION Right 11/16/2023    Procedure: EXCISION EXOSTOSIS 5TH TOE RIght;  Surgeon: Sri Herring DPM;  [facility];  Service: Podiatry    BREAST BIOPSY Left 1993    benign    CSF SHUNT      LP shunt - 2015 -  shunt - 2017    ESOPHAGOGASTRODUODENOSCOPY N/A 02/23/2018    Procedure: ESOPHAGOGASTRODUODENOSCOPY (EGD) WITH ESOPHAGEAL STENT PLACEMENT;  Surgeon: Sergio Chung MD;  [facility];  Service: Thoracic    ESOPHAGOGASTRODUODENOSCOPY N/A 02/23/2018    Procedure: ESOPHAGOGASTRODUODENOSCOPY (EGD) WITH REMOVAL ESOPHAGEAL STENT  AND REPLACEMENT WITH 23mm X155mm WALLFLEX  "ESOPHAGEAL STENT;  Surgeon: Sergio Chung MD;  [facility];  Service: Thoracic    ESOPHAGOGASTRODUODENOSCOPY N/A 03/28/2018    Procedure: ESOPHAGOGASTRODUODENOSCOPY (EGD) with PEJ placement.;  Surgeon: Andrea Gonzales MD;  [facility];  Service: Gastroenterology    ESOPHAGOGASTRODUODENOSCOPY N/A 04/05/2018    Procedure: ESOPHAGOGASTRODUODENOSCOPY (EGD) with botox injection and kaofed placement;  Surgeon: Naomie Leroy DO;  [facility];  Service: Gastroenterology    ESOPHAGOGASTRODUODENOSCOPY N/A 04/10/2018    Procedure: ESOPHAGOGASTRODUODENOSCOPY (EGD) with Kaofed placement;  Surgeon: Saroj Kirkland MD;  [facility];  Service: Gastroenterology    ESOPHAGOSCOPY WITH STENT INSERTION N/A 01/24/2018    Procedure: INSERTION STENT ESOPHAGEAL;  Surgeon: Gonsalo Yang MD;  [facility];  Service: Gastroenterology    EYE SURGERY Bilateral 1980    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)     FL RETROGRADE PYELOGRAM  06/24/2020    FL RETROGRADE PYELOGRAM  08/21/2021    FL RETROGRADE PYELOGRAM  5/4/2023    FL RETROGRADE PYELOGRAM  5/18/2023    GASTRIC BYPASS  12/19/2016    Lap sleeve gastrectomy w/shunt length shortening procedure    HIATAL HERNIA REPAIR      HYSTERECTOMY  03/14/2013    IR LUMBAR PUNCTURE  08/29/2018    LAPAROTOMY N/A 01/25/2018    Procedure: Exploratory Laparotomy, wash out,placement of drains, placement of NG feeding tube ;  Surgeon: Ruperto Jasso DO;  [facility];  Service: General    LUMBAR PERITONEAL SHUNT  11/19/2015    Laparoscopic assisted    NH BREAST REDUCTION Bilateral 03/25/2022    Procedure: BILATERAL BREAST REDUCTION;  Surgeon: Indio Williamson MD;  [facility];  Service: Plastics    NH CRTJ SHUNT [id number] Right 12/18/2019    Procedure: IMAGE GUIDED INSERTION OF RIGHT CORONAL VENTRICULAR-ATRIAL SHUNT;  Surgeon: Edouard Pires MD;  [facility];  Service: Neurosurgery    NH CRTJ SHUNT " [id number] TERMINUS Right 05/31/2017    Procedure: IMAGE GUIDED CORONAL PLACEMENT OF PROGRAMABLE VENTRICULAR-PERITONEAL SHUNT, REMOVAL OF LP SHUNT ;  Surgeon: Edouard Pires MD;  [facility];  Service: Neurosurgery    [place] CYSTO BLADDER W/URETERAL CATHETERIZATION N/A 06/06/2020    Procedure: Bilateral CYSTOSCOPY RETROGRADE PYELOGRAM WITH INSERTION STENT URETERAL;  Surgeon: Moe Mcgrath MD;  [facility];  Service: Urology    KY CYSTO BLADDER W/URETERAL CATHETERIZATION Bilateral 5/4/2023    Procedure: CYSTOSCOPY RETROGRADE PYELOGRAM WITH INSERTION STENT URETERAL;  Surgeon: Moe Mgcrath MD;  [facility];  Service: Urology    KY CYSTO/URETERO W/LITHOTRIPSY &INDWELL STENT INSRT Bilateral 06/24/2020    Procedure: CYSTOSCOPY URETEROSCOPY WITH LITHOTRIPSY HOLMIUM LASER, RETROGRADE PYELOGRAM AND exchange bilateral  STENTs URETERAL;  Surgeon: Mark Godfrey MD;  [facility];  Service: Urology    KY CYSTO/URETERO W/LITHOTRIPSY &INDWELL STENT INSRT Left 08/21/2021    Procedure: CYSTOSCOPY; LEFT URETEROSCOPY WITH RETROGRADE PYELOGRAM, REMOVAL OF STONE AND INSERTION LEFT URETERAL STENT;  Surgeon: Jamie Nuñez MD;  [facility];  Service: Urology    KY CYSTO/URETERO W/LITHOTRIPSY &INDWELL STENT INSRT N/A 5/18/2023    Procedure: CYSTOSCOPY URETEROSCOPY WITH LITHOTRIPSY HOLMIUM LASER, RETROGRADE PYELOGRAM, REMOVAL OF BILATERAL STENTS,  AND INSERTION STENT URETERAL LEFT ,BASKET EXTRACTION OF STONE LEFT SIDE;  Surgeon: Jamie Nuñez MD;  [facility];  Service: Urology    KY EGD TRANSORAL BIOPSY SINGLE/MULTIPLE N/A 06/27/2018    Procedure: ESOPHAGOGASTRODUODENOSCOPY (EGD) with padlock clip placement;  Surgeon: Lisbet Gonzalez MD;  [facility];  Service: Bariatrics    KY RMVL COMPL CSF SHUNT SYSTEM W/O RPLCMT SHUNT Right 02/05/2018    Procedure: Removal of  shunt;  Surgeon: Edouard Pires MD;  [facility];  Service:  "Neurosurgery    IN RPLCMT/REVJ CSF SHUNT VALVE/CATH SHUNT SYS Right 01/25/2018    Procedure: Externalization of right-sided SHUNT VENTRICULAR-PERITONEAL in anterior chest wall ribs two and three level  ;  Surgeon: Miquel Duarte MD;  [facility];  Service: Neurosurgery    REDUCTION MAMMAPLASTY Bilateral     WRIST SURGERY Right     x3 2006, 2008     Family History   Problem Relation Age of Onset    Kidney cancer Mother 66    No Known Problems Father     No Known Problems Sister     No Known Problems Daughter     No Known Problems Maternal Grandmother     Colon cancer Maternal Grandfather 39    No Known Problems Paternal Grandmother     Cancer Paternal Grandfather     Thyroid cancer Brother 40    No Known Problems Maternal Aunt     No Known Problems Maternal Aunt     No Known Problems Paternal Aunt     Cancer Family         Gastric    Leukemia Family     Colon cancer Family     Pancreatic cancer Family     Lung cancer Maternal Uncle 61       Objective:  /76 (BP Location: Left arm, Patient Position: Sitting, Cuff Size: Standard)   Pulse 64   Temp 98.3 °F (36.8 °C) (Temporal)   Ht 5' 3\" (1.6 m)   Wt 68.3 kg (150 lb 9.6 oz)   LMP  (LMP Unknown)   SpO2 98%   BMI 26.68 kg/m²        Physical Exam  Vitals reviewed.   Constitutional:       General: She is not in acute distress.  HENT:      Head: Normocephalic and atraumatic.      Right Ear: Tympanic membrane, ear canal and external ear normal.      Left Ear: Tympanic membrane, ear canal and external ear normal.      Nose: Nose normal.      Mouth/Throat:      Mouth: Mucous membranes are moist.   Eyes:      General:         Right eye: No discharge.         Left eye: No discharge.      Extraocular Movements: Extraocular movements intact.      Conjunctiva/sclera: Conjunctivae normal.      Pupils: Pupils are equal, round, and reactive to light.   Cardiovascular:      Rate and Rhythm: Normal rate and regular rhythm.   Pulmonary:      Effort: Pulmonary effort " is normal. No respiratory distress.      Breath sounds: Normal breath sounds. No wheezing or rales.   Abdominal:      General: Bowel sounds are normal. There is no distension.   Musculoskeletal:      Cervical back: Normal range of motion.      Right lower leg: No edema.      Left lower leg: No edema.   Skin:     General: Skin is warm.      Findings: No erythema or rash.   Neurological:      General: No focal deficit present.      Mental Status: She is alert and oriented to person, place, and time.      Cranial Nerves: No cranial nerve deficit.   Psychiatric:         Mood and Affect: Mood normal.         Behavior: Behavior normal.

## 2024-02-20 NOTE — LETTER
February 20, 2024       No Recipients    Patient: Shweta Nolan   YOB: 1975   Date of Visit: 2/20/2024       Dear Dr. Herring:    Thank you for referring Shweta Nolan to me for evaluation. Below are my notes for this consultation.    If you have questions, please do not hesitate to call me. I look forward to following your patient along with you.         Sincerely,        Adina Oconnell PA-C        CC:   No Recipients    Adina Oconnell PA-C  2/20/2024  2:20 PM  Incomplete   Assessment/Plan:         There are no diagnoses linked to this encounter.      Subjective:      Patient ID: Shweta Nolan is a 48 y.o. female.    Presurgical Evaluation    Subjective:     Patient ID: Shweta Nolan is a 48 y.o. female.    Patient presents with:  Pre-op Exam: pre-op----Right 5th toe arthroplasty with Dr Herring on 2/29/24      The following portions of the patient's history were reviewed and updated as appropriate: allergies, current medications, past family history, past medical history, past social history, past surgical history, and problem list.    Procedure date: 2/29/24    Surgeon:  Dr Herring   Planned procedure:  R 5th toe arthroplasty   Diagnosis for procedure:  R toe pain, exostosis   Prior anesthesia: Yes   MAC; Complications:  None / Tolerated well    CAD History: None    Pulmonary History: None    Renal history: None    Diabetes History:  None     Neurological History: None     On Immunosuppressant meds/biologics: No    Preop labs/testing available and reviewed: yes    eGFR       Date                     Value               Ref Range           Status                02/17/2024               102                 ml/min/1.73sq m     Final            ----------  WBC       Date                     Value               Ref Range           Status                02/17/2024               5.51                4.31 - 10.16 T*     Final            ----------  Hemoglobin       Date                     Value                Ref Range           Status                2024               16.7 (H)            11.5 - 15.4 g/*     Final            ----------  Hematocrit       Date                     Value               Ref Range           Status                2024               49.6 (H)            34.8 - 46.1 %       Final            ----------  Platelets       Date                     Value               Ref Range           Status                2024               187                 149 - 390 Thou*     Final            ----------       EKG yes    Echo no    Stress test/cath no    PFT/[place] no    Functional capacity: Singles tennis                       7-12 Mets   Pick the highest level patient can comfortably perform   4 mets or greater for surgery    RCRI  High Risk surgery?         1 Point  CAD History:         1 Point   MI; Positive Stress Test; CP due to Mi;  Nitrate Usage to control Angina; Pathologic Q wave on EKG  CHF Active:         1 Point   Pulm Edema; Paroxysmal Nocturnal Dyspnea;  Bibasilar Rales (crackles);S3; CHF on CXR  Cerebrovascular Disease (TIA or CVA):     1 Point  DM on Insulin:        1 Point  Serum Creat >2.0 mg/dl:       1 Point          Total Points: 0     Scorin: Class I, Very Low Risk (0.4%)     1: Class II, Low risk (0.9%)     2: Class III Moderate (6.6%)     3: Class IV High (>11%)      ALESHIA Risk:  GFR: eGFR       Date                     Value               Ref Range           Status                2024               102                 ml/min/1.73sq m     Final            ----------      For PCP:  If GFR>60, Hold ACE/ARB/Diuretic on the day of surgery, and NSAIDS 10 days before.    If GFR<45, Consider PRE and POST op Nephrology Consult.    If 46 <GFR> 59 : Has Patient had ALESHIA in last 6 Months? no   If YES: Preop Nephrology consult   If No:  Post Op Nephrology consult.                       The following portions of the patient's history were reviewed and updated as appropriate:  allergies, current medications, past family history, past medical history, past social history, past surgical history, and problem list.    Review of Systems   Constitutional:  Positive for diaphoresis. Negative for activity change, appetite change, chills, fatigue and fever.   HENT:  Negative for congestion, sinus pressure and sore throat.    Eyes:  Negative for pain and redness.   Respiratory:  Negative for cough, shortness of breath and wheezing.    Cardiovascular:  Negative for chest pain, palpitations and leg swelling.   Gastrointestinal:  Negative for abdominal pain, constipation and diarrhea.   Genitourinary:  Negative for dysuria.   Musculoskeletal:  Negative for arthralgias, back pain and gait problem.   Skin:  Negative for rash.   Neurological:  Negative for dizziness and headaches.   Psychiatric/Behavioral:  Negative for sleep disturbance. The patient is not nervous/anxious.          Past Medical History:   Diagnosis Date   • Abdominal pain    • Acute cystitis with hematuria 01/03/2020   • Brain condition     Pseudotumor Cerebri    • Chronic kidney disease    • COVID-19 virus infection 11/10/2022   • De Quervain's disease (tenosynovitis)    • Esophageal perforation    • Gastric leak    • GERD (gastroesophageal reflux disease)    • Headache    • Idiopathic intracranial hypertension    • Kidney stone    • Moderate protein-calorie malnutrition (HCC) 02/21/2018   • No blood products     per pt: personal and [employer] beliefs. surgeon office aware 12/13/19   • Papilledema, both eyes    • PONV (postoperative nausea and vomiting)    • Postgastrectomy malabsorption    • Presence of lumboperitoneal shunt     Resolved: Sep 20, 2017   • Rotator cuff tendinitis     Resolved: Aug 23, 2017   • Visual field defect          Current Outpatient Medications:   •  atorvastatin (LIPITOR) 10 mg tablet, Take 1 tablet (10 mg total) by mouth daily at bedtime, Disp: 90 tablet, Rfl: 1  •  butalbital-acetaminophen-caffeine  (FIORICET,ESGIC) -40 mg per tablet, take 1 tablet by mouth every 6 hours if needed for headache or migraines -ONLY 10 PER MONTH, Disp: 10 tablet, Rfl: 3  •  dexlansoprazole (DEXILANT) 60 MG capsule, Take 1 capsule (60 mg total) by mouth daily, Disp: 90 capsule, Rfl: 3  •  Multiple Vitamin (MULTIVITAMIN) tablet, Take 1 tablet by mouth daily Wears a patch, Disp: , Rfl:   •  naloxone (NARCAN) 4 mg/0.1 mL nasal spray, Administer 1 spray into a nostril. If no response after 2-3 minutes, give another dose in the other nostril using a new spray., Disp: 1 each, Rfl: 1  •  Nutritional Supplements (Menopause Formula) TABS, Take by mouth 2 gummies daily, Disp: , Rfl:   •  ascorbic acid (VITAMIN C) 250 MG CHEW, Chew 250 mg daily (Patient not taking: Reported on 2/20/2024), Disp: , Rfl:   •  Biotin 1 MG CAPS, Take 1 mg by mouth daily   (Patient not taking: Reported on 2/20/2024), Disp: , Rfl:   •  calcium citrate-vitamin D (CITRACAL+D) 315-200 MG-UNIT per tablet, Take 1 tablet by mouth 2 (two) times a day (Patient not taking: Reported on 2/20/2024), Disp: , Rfl:   •  ferrous gluconate 324 (37.5 Fe) mg, Take 324 mg by mouth 2 (two) times a day before meals (Patient not taking: Reported on 2/20/2024), Disp: , Rfl:   •  folic acid (FOLVITE) 1 mg tablet, Take 2 tablets (2 mg total) by mouth daily (Patient not taking: Reported on 2/20/2024), Disp: 180 tablet, Rfl: 3  •  Multiple Vitamins-Minerals (Hair/Skin/Nails) CAPS, Take 1 tablet by mouth 2 (two) times a day   (Patient not taking: Reported on 2/20/2024), Disp: , Rfl:   •  vitamin B-12 (VITAMIN B-12) 500 mcg tablet, Take 500 mcg by mouth daily (Patient not taking: Reported on 2/20/2024), Disp: , Rfl:     Allergies   Allergen Reactions   • Benadryl [Diphenhydramine] Anaphylaxis     Throat closing   • Phenergan [Promethazine] Anaphylaxis   • Nsaids Other (See Comments)     Avoids due to Hx Gastric Sleeve       Social History  Past Surgical History:   Procedure Laterality Date  "  • BONE EXOSTOSIS EXCISION Right 11/16/2023    Procedure: EXCISION EXOSTOSIS 5TH TOE RIght;  Surgeon: Sri Herring DPM;  [facility];  Service: Podiatry   • BREAST BIOPSY Left 1993    benign   • CSF SHUNT      LP shunt - 2015 -  shunt - 2017   • ESOPHAGOGASTRODUODENOSCOPY N/A 02/23/2018    Procedure: ESOPHAGOGASTRODUODENOSCOPY (EGD) WITH ESOPHAGEAL STENT PLACEMENT;  Surgeon: Sergio Chung MD;  [facility];  Service: Thoracic   • ESOPHAGOGASTRODUODENOSCOPY N/A 02/23/2018    Procedure: ESOPHAGOGASTRODUODENOSCOPY (EGD) WITH REMOVAL ESOPHAGEAL STENT  AND REPLACEMENT WITH 23mm X155mm WALLFLEX ESOPHAGEAL STENT;  Surgeon: Sergio Chung MD;  [facility];  Service: Thoracic   • ESOPHAGOGASTRODUODENOSCOPY N/A 03/28/2018    Procedure: ESOPHAGOGASTRODUODENOSCOPY (EGD) with PEJ placement.;  Surgeon: Andrea Gonzales MD;  [facility];  Service: Gastroenterology   • ESOPHAGOGASTRODUODENOSCOPY N/A 04/05/2018    Procedure: ESOPHAGOGASTRODUODENOSCOPY (EGD) with botox injection and kaofed placement;  Surgeon: Naomie Leroy DO;  [facility];  Service: Gastroenterology   • ESOPHAGOGASTRODUODENOSCOPY N/A 04/10/2018    Procedure: ESOPHAGOGASTRODUODENOSCOPY (EGD) with Kaofed placement;  Surgeon: Saroj Kirkland MD;  [facility];  Service: Gastroenterology   • ESOPHAGOSCOPY WITH STENT INSERTION N/A 01/24/2018    Procedure: INSERTION STENT ESOPHAGEAL;  Surgeon: Gonsalo Yang MD;  [facility];  Service: Gastroenterology   • EYE SURGERY Bilateral 1980    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)    • FL RETROGRADE PYELOGRAM  06/24/2020   • FL RETROGRADE PYELOGRAM  08/21/2021   • FL RETROGRADE PYELOGRAM  5/4/2023   • FL RETROGRADE PYELOGRAM  5/18/2023   • GASTRIC BYPASS  12/19/2016    Lap sleeve gastrectomy w/shunt length shortening procedure   • HIATAL HERNIA REPAIR     • HYSTERECTOMY  03/14/2013   • IR LUMBAR PUNCTURE  08/29/2018   • LAPAROTOMY N/A 01/25/2018    " Procedure: Exploratory Laparotomy, wash out,placement of drains, placement of NG feeding tube ;  Surgeon: Ruperto Jasso DO;  [facility];  Service: General   • LUMBAR PERITONEAL SHUNT  11/19/2015    Laparoscopic assisted   • MS BREAST REDUCTION Bilateral 03/25/2022    Procedure: BILATERAL BREAST REDUCTION;  Surgeon: Indio Williamson MD;  [facility];  Service: Plastics   • MS CRTJ SHUNT [id number] Right 12/18/2019    Procedure: IMAGE GUIDED INSERTION OF RIGHT CORONAL VENTRICULAR-ATRIAL SHUNT;  Surgeon: Edouard Pires MD;  [facility];  Service: Neurosurgery   • MS CRTJ SHUNT [id number] TERMINUS Right 05/31/2017    Procedure: IMAGE GUIDED CORONAL PLACEMENT OF PROGRAMABLE VENTRICULAR-PERITONEAL SHUNT, REMOVAL OF LP SHUNT ;  Surgeon: Edouard Pires MD;  [facility];  Service: Neurosurgery   • MS CYSTO BLADDER W/URETERAL CATHETERIZATION N/A 06/06/2020    Procedure: Bilateral CYSTOSCOPY RETROGRADE PYELOGRAM WITH INSERTION STENT URETERAL;  Surgeon: Moe Mcgrath MD;  [facility]: [facility];  Service: Urology   • MS CYSTO BLADDER W/URETERAL CATHETERIZATION Bilateral 5/4/2023    Procedure: CYSTOSCOPY RETROGRADE PYELOGRAM WITH INSERTION STENT URETERAL;  Surgeon: Moe Mcgrath MD;  [facility];  Service: Urology   • MS CYSTO/URETERO W/LITHOTRIPSY &INDWELL STENT INSRT Bilateral 06/24/2020    Procedure: CYSTOSCOPY URETEROSCOPY WITH LITHOTRIPSY HOLMIUM LASER, RETROGRADE PYELOGRAM AND exchange bilateral  STENTs URETERAL;  Surgeon: Mark Godfrey MD;  [facility];  Service: Urology   • MS CYSTO/URETERO W/LITHOTRIPSY &INDWELL STENT INSRT Left 08/21/2021    Procedure: CYSTOSCOPY; LEFT URETEROSCOPY WITH RETROGRADE PYELOGRAM, REMOVAL OF STONE AND INSERTION LEFT URETERAL STENT;  Surgeon: Jamie Nuñez MD;  [facility];  Service: Urology   • MS CYSTO/URETERO W/LITHOTRIPSY &INDWELL STENT INSRT N/A 5/18/2023    Procedure: CYSTOSCOPY  "URETEROSCOPY WITH LITHOTRIPSY HOLMIUM LASER, RETROGRADE PYELOGRAM, REMOVAL OF BILATERAL STENTS,  AND INSERTION STENT URETERAL LEFT ,BASKET EXTRACTION OF STONE LEFT SIDE;  Surgeon: Jamie Nñuez MD;  [facility];  Service: Urology   • PA EGD TRANSORAL BIOPSY SINGLE/MULTIPLE N/A 06/27/2018    Procedure: ESOPHAGOGASTRODUODENOSCOPY (EGD) with padlock clip placement;  Surgeon: Lisbet Gonzalez MD;  [facility];  Service: Bariatrics   • PA RMVL COMPL CSF SHUNT SYSTEM W/O RPLCMT SHUNT Right 02/05/2018    Procedure: Removal of  shunt;  Surgeon: Edouard Pires MD;  [facility];  Service: Neurosurgery   • PA RPLCMT/REVJ CSF SHUNT VALVE/CATH SHUNT SYS Right 01/25/2018    Procedure: Externalization of right-sided SHUNT VENTRICULAR-PERITONEAL in anterior chest wall ribs two and three level  ;  Surgeon: Miquel Duarte MD;  [facility];  Service: Neurosurgery   • REDUCTION MAMMAPLASTY Bilateral    • WRIST SURGERY Right     x3 2006, 2008     Family History   Problem Relation Age of Onset   • Kidney cancer Mother 66   • No Known Problems Father    • No Known Problems Sister    • No Known Problems Daughter    • No Known Problems Maternal Grandmother    • Colon cancer Maternal Grandfather 39   • No Known Problems Paternal Grandmother    • Cancer Paternal Grandfather    • Thyroid cancer Brother 40   • No Known Problems Maternal Aunt    • No Known Problems Maternal Aunt    • No Known Problems Paternal Aunt    • Cancer Family         Gastric   • Leukemia Family    • Colon cancer Family    • Pancreatic cancer Family    • Lung cancer Maternal Uncle 61       Objective:  /76 (BP Location: Left arm, Patient Position: Sitting, Cuff Size: Standard)   Pulse 64   Temp 98.3 °F (36.8 °C) (Temporal)   Ht 5' 3\" (1.6 m)   Wt 68.3 kg (150 lb 9.6 oz)   LMP  (LMP Unknown)   SpO2 98%   BMI 26.68 kg/m²        Physical Exam  Vitals reviewed.   Constitutional:       General: She is not in acute " distress.  HENT:      Head: Normocephalic and atraumatic.      Right Ear: Tympanic membrane, ear canal and external ear normal.      Left Ear: Tympanic membrane, ear canal and external ear normal.      Nose: Nose normal.      Mouth/Throat:      Mouth: Mucous membranes are moist.   Eyes:      General:         Right eye: No discharge.         Left eye: No discharge.      Extraocular Movements: Extraocular movements intact.      Conjunctiva/sclera: Conjunctivae normal.      Pupils: Pupils are equal, round, and reactive to light.   Cardiovascular:      Rate and Rhythm: Normal rate and regular rhythm.   Pulmonary:      Effort: Pulmonary effort is normal. No respiratory distress.      Breath sounds: Normal breath sounds. No wheezing or rales.   Abdominal:      General: Bowel sounds are normal. There is no distension.   Musculoskeletal:      Cervical back: Normal range of motion.      Right lower leg: No edema.      Left lower leg: No edema.   Skin:     General: Skin is warm.      Findings: No erythema or rash.   Neurological:      General: No focal deficit present.      Mental Status: She is alert and oriented to person, place, and time.      Cranial Nerves: No cranial nerve deficit.   Psychiatric:         Mood and Affect: Mood normal.         Behavior: Behavior normal.           Adina Oconnell PA-C  2/20/2024  1:57 PM  Sign when Signing Visit   Assessment/Plan:    No problem-specific Assessment & Plan notes found for this encounter.       There are no diagnoses linked to this encounter.      Subjective:      Patient ID: Shweta Nolan is a 48 y.o. female.    Presurgical Evaluation    Subjective:     Patient ID: Shweta Nolan is a 48 y.o. female.    Patient presents with:  Pre-op Exam: pre-op----Right 5th toe arthroplasty with Dr Herring on 2/29/24      The following portions of the patient's history were reviewed and updated as appropriate: allergies, current medications, past family history, past medical history, past  social history, past surgical history, and problem list.    Procedure date: 2/29/24    Surgeon:  Dr Herring   Planned procedure:  R 5th toe arthroplasty   Diagnosis for procedure:  R toe pain, exostosis   Prior anesthesia: Yes   MAC; Complications:  None / Tolerated well    CAD History: None    Pulmonary History: None    Renal history: None    Diabetes History:  None     Neurological History: None     On Immunosuppressant meds/biologics: No    Preop labs/testing available and reviewed: yes    eGFR       Date                     Value               Ref Range           Status                02/17/2024               102                 ml/min/1.73sq m     Final            ----------  WBC       Date                     Value               Ref Range           Status                02/17/2024               5.51                4.31 - 10.16 T*     Final            ----------  Hemoglobin       Date                     Value               Ref Range           Status                02/17/2024               16.7 (H)            11.5 - 15.4 g/*     Final            ----------  Hematocrit       Date                     Value               Ref Range           Status                02/17/2024               49.6 (H)            34.8 - 46.1 %       Final            ----------  Platelets       Date                     Value               Ref Range           Status                02/17/2024               187                 149 - 390 Thou*     Final            ----------       EKG yes    Echo no    Stress test/cath no    PFT/[place] no    Functional capacity: Singles tennis                       7-12 Mets   Pick the highest level patient can comfortably perform   4 mets or greater for surgery    RCRI  High Risk surgery?         1 Point  CAD History:         1 Point   MI; Positive Stress Test; CP due to Mi;  Nitrate Usage to control Angina; Pathologic Q wave on EKG  CHF Active:         1 Point   Pulm Edema; Paroxysmal Nocturnal  Dyspnea;  Bibasilar Rales (crackles);S3; CHF on CXR  Cerebrovascular Disease (TIA or CVA):     1 Point  DM on Insulin:        1 Point  Serum Creat >2.0 mg/dl:       1 Point          Total Points: 0     Scorin: Class I, Very Low Risk (0.4%)     1: Class II, Low risk (0.9%)     2: Class III Moderate (6.6%)     3: Class IV High (>11%)      ALESHIA Risk:  GFR: eGFR       Date                     Value               Ref Range           Status                2024               102                 ml/min/1.73sq m     Final            ----------      For PCP:  If GFR>60, Hold ACE/ARB/Diuretic on the day of surgery, and NSAIDS 10 days before.    If GFR<45, Consider PRE and POST op Nephrology Consult.    If 46 <GFR> 59 : Has Patient had ALESHIA in last 6 Months? no   If YES: Preop Nephrology consult   If No:  Post Op Nephrology consult.                       The following portions of the patient's history were reviewed and updated as appropriate: allergies, current medications, past family history, past medical history, past social history, past surgical history, and problem list.    Review of Systems   Constitutional:  Positive for diaphoresis. Negative for activity change, appetite change, chills, fatigue and fever.   HENT:  Negative for congestion, sinus pressure and sore throat.    Eyes:  Negative for pain and redness.   Respiratory:  Negative for cough, shortness of breath and wheezing.    Cardiovascular:  Negative for chest pain, palpitations and leg swelling.   Gastrointestinal:  Negative for abdominal pain, constipation and diarrhea.   Genitourinary:  Negative for dysuria.   Musculoskeletal:  Negative for arthralgias, back pain and gait problem.   Skin:  Negative for rash.   Neurological:  Negative for dizziness and headaches.   Psychiatric/Behavioral:  Negative for sleep disturbance. The patient is not nervous/anxious.          Past Medical History:   Diagnosis Date   • Abdominal pain    • Acute cystitis with  hematuria 01/03/2020   • Brain condition     Pseudotumor Cerebri    • Chronic kidney disease    • COVID-19 virus infection 11/10/2022   • De Quervain's disease (tenosynovitis)    • Esophageal perforation    • Gastric leak    • GERD (gastroesophageal reflux disease)    • Headache    • Idiopathic intracranial hypertension    • Kidney stone    • Moderate protein-calorie malnutrition (HCC) 02/21/2018   • No blood products     per pt: personal and [employer] beliefs. surgeon office aware 12/13/19   • Papilledema, both eyes    • PONV (postoperative nausea and vomiting)    • Postgastrectomy malabsorption    • Presence of lumboperitoneal shunt     Resolved: Sep 20, 2017   • Rotator cuff tendinitis     Resolved: Aug 23, 2017   • Visual field defect          Current Outpatient Medications:   •  atorvastatin (LIPITOR) 10 mg tablet, Take 1 tablet (10 mg total) by mouth daily at bedtime, Disp: 90 tablet, Rfl: 1  •  butalbital-acetaminophen-caffeine (FIORICET,ESGIC) -40 mg per tablet, take 1 tablet by mouth every 6 hours if needed for headache or migraines -ONLY 10 PER MONTH, Disp: 10 tablet, Rfl: 3  •  dexlansoprazole (DEXILANT) 60 MG capsule, Take 1 capsule (60 mg total) by mouth daily, Disp: 90 capsule, Rfl: 3  •  Multiple Vitamin (MULTIVITAMIN) tablet, Take 1 tablet by mouth daily Wears a patch, Disp: , Rfl:   •  naloxone (NARCAN) 4 mg/0.1 mL nasal spray, Administer 1 spray into a nostril. If no response after 2-3 minutes, give another dose in the other nostril using a new spray., Disp: 1 each, Rfl: 1  •  Nutritional Supplements (Menopause Formula) TABS, Take by mouth 2 gummies daily, Disp: , Rfl:   •  ascorbic acid (VITAMIN C) 250 MG CHEW, Chew 250 mg daily (Patient not taking: Reported on 2/20/2024), Disp: , Rfl:   •  Biotin 1 MG CAPS, Take 1 mg by mouth daily   (Patient not taking: Reported on 2/20/2024), Disp: , Rfl:   •  calcium citrate-vitamin D (CITRACAL+D) 315-200 MG-UNIT per tablet, Take 1 tablet by mouth 2  (two) times a day (Patient not taking: Reported on 2/20/2024), Disp: , Rfl:   •  ferrous gluconate 324 (37.5 Fe) mg, Take 324 mg by mouth 2 (two) times a day before meals (Patient not taking: Reported on 2/20/2024), Disp: , Rfl:   •  folic acid (FOLVITE) 1 mg tablet, Take 2 tablets (2 mg total) by mouth daily (Patient not taking: Reported on 2/20/2024), Disp: 180 tablet, Rfl: 3  •  Multiple Vitamins-Minerals (Hair/Skin/Nails) CAPS, Take 1 tablet by mouth 2 (two) times a day   (Patient not taking: Reported on 2/20/2024), Disp: , Rfl:   •  vitamin B-12 (VITAMIN B-12) 500 mcg tablet, Take 500 mcg by mouth daily (Patient not taking: Reported on 2/20/2024), Disp: , Rfl:     Allergies   Allergen Reactions   • Benadryl [Diphenhydramine] Anaphylaxis     Throat closing   • Phenergan [Promethazine] Anaphylaxis   • Nsaids Other (See Comments)     Avoids due to Hx Gastric Sleeve       Social History  Past Surgical History:   Procedure Laterality Date   • BONE EXOSTOSIS EXCISION Right 11/16/2023    Procedure: EXCISION EXOSTOSIS 5TH TOE RIght;  Surgeon: Sri Herring DPM;  [facility];  Service: Podiatry   • BREAST BIOPSY Left 1993    benign   • CSF SHUNT      LP shunt - 2015 -  shunt - 2017   • ESOPHAGOGASTRODUODENOSCOPY N/A 02/23/2018    Procedure: ESOPHAGOGASTRODUODENOSCOPY (EGD) WITH ESOPHAGEAL STENT PLACEMENT;  Surgeon: Sergio Chung MD;  [facility];  Service: Thoracic   • ESOPHAGOGASTRODUODENOSCOPY N/A 02/23/2018    Procedure: ESOPHAGOGASTRODUODENOSCOPY (EGD) WITH REMOVAL ESOPHAGEAL STENT  AND REPLACEMENT WITH 23mm X155mm WALLFLEX ESOPHAGEAL STENT;  Surgeon: Sergio Chung MD;  [facility];  Service: Thoracic   • ESOPHAGOGASTRODUODENOSCOPY N/A 03/28/2018    Procedure: ESOPHAGOGASTRODUODENOSCOPY (EGD) with PEJ placement.;  Surgeon: Andrea Gonzales MD;  [facility];  Service: Gastroenterology   • ESOPHAGOGASTRODUODENOSCOPY N/A 04/05/2018    Procedure: ESOPHAGOGASTRODUODENOSCOPY (EGD)  "with botox injection and kaofed placement;  Surgeon: Naomie Leroy DO;  [facility];  Service: Gastroenterology   • ESOPHAGOGASTRODUODENOSCOPY N/A 04/10/2018    Procedure: ESOPHAGOGASTRODUODENOSCOPY (EGD) with Kaofed placement;  Surgeon: Saroj Kirkland MD;  [facility];  Service: Gastroenterology   • ESOPHAGOSCOPY WITH STENT INSERTION N/A 01/24/2018    Procedure: INSERTION STENT ESOPHAGEAL;  Surgeon: Gonsalo Yang MD;  [facility];  Service: Gastroenterology   • EYE SURGERY Bilateral 1980    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)    • FL RETROGRADE PYELOGRAM  06/24/2020   • FL RETROGRADE PYELOGRAM  08/21/2021   • FL RETROGRADE PYELOGRAM  5/4/2023   • FL RETROGRADE PYELOGRAM  5/18/2023   • GASTRIC BYPASS  12/19/2016    Lap sleeve gastrectomy w/shunt length shortening procedure   • HIATAL HERNIA REPAIR     • HYSTERECTOMY  03/14/2013   • IR LUMBAR PUNCTURE  08/29/2018   • LAPAROTOMY N/A 01/25/2018    Procedure: Exploratory Laparotomy, wash out,placement of drains, placement of NG feeding tube ;  Surgeon: Ruperto Jasso DO;  [facility];  Service: General   • LUMBAR PERITONEAL SHUNT  11/19/2015    Laparoscopic assisted   • KS BREAST REDUCTION Bilateral 03/25/2022    Procedure: BILATERAL BREAST REDUCTION;  Surgeon: Indio Williamson MD;  [facility]: [facility];  Service: Plastics   • KS CRTJ SHUNT [id number] Right 12/18/2019    Procedure: IMAGE GUIDED INSERTION OF RIGHT CORONAL VENTRICULAR-ATRIAL SHUNT;  Surgeon: Edouard Pires MD;  [facility];  Service: Neurosurgery   • KS CRTJ SHUNT [id number] TERMINUS Right 05/31/2017    Procedure: IMAGE GUIDED CORONAL PLACEMENT OF PROGRAMABLE VENTRICULAR-PERITONEAL SHUNT, REMOVAL OF LP SHUNT ;  Surgeon: Edouard Pires MD;  [facility];  Service: Neurosurgery   • KS CYSTO BLADDER W/URETERAL CATHETERIZATION N/A 06/06/2020    Procedure: Bilateral CYSTOSCOPY RETROGRADE PYELOGRAM WITH " INSERTION STENT URETERAL;  Surgeon: Moe Mcgrath MD;  Location: [facility];  Service: Urology   • CO CYSTO BLADDER W/URETERAL CATHETERIZATION Bilateral 5/4/2023    Procedure: CYSTOSCOPY RETROGRADE PYELOGRAM WITH INSERTION STENT URETERAL;  Surgeon: Moe Mcgrath MD;  [facility];  Service: Urology   • CO CYSTO/URETERO W/LITHOTRIPSY &INDWELL STENT INSRT Bilateral 06/24/2020    Procedure: CYSTOSCOPY URETEROSCOPY WITH LITHOTRIPSY HOLMIUM LASER, RETROGRADE PYELOGRAM AND exchange bilateral  STENTs URETERAL;  Surgeon: Mark Godfrey MD;  [facility];  Service: Urology   • CO CYSTO/URETERO W/LITHOTRIPSY &INDWELL STENT INSRT Left 08/21/2021    Procedure: CYSTOSCOPY; LEFT URETEROSCOPY WITH RETROGRADE PYELOGRAM, REMOVAL OF STONE AND INSERTION LEFT URETERAL STENT;  Surgeon: Jamie Nuñez MD;  [facility];  Service: Urology   • CO CYSTO/URETERO W/LITHOTRIPSY &INDWELL STENT INSRT N/A 5/18/2023    Procedure: CYSTOSCOPY URETEROSCOPY WITH LITHOTRIPSY HOLMIUM LASER, RETROGRADE PYELOGRAM, REMOVAL OF BILATERAL STENTS,  AND INSERTION STENT URETERAL LEFT ,BASKET EXTRACTION OF STONE LEFT SIDE;  Surgeon: Jamie Nuñez MD;  [facility];  Service: Urology   • CO EGD TRANSORAL BIOPSY SINGLE/MULTIPLE N/A 06/27/2018    Procedure: ESOPHAGOGASTRODUODENOSCOPY (EGD) with padlock clip placement;  Surgeon: Lisbet Gonzalez MD;  [facility];  Service: Bariatrics   • CO RMVL COMPL CSF SHUNT SYSTEM W/O RPLCMT SHUNT Right 02/05/2018    Procedure: Removal of  shunt;  Surgeon: Edouard Pires MD;  [facility];  Service: Neurosurgery   • CO RPLCMT/REVJ CSF SHUNT VALVE/CATH SHUNT SYS Right 01/25/2018    Procedure: Externalization of right-sided SHUNT VENTRICULAR-PERITONEAL in anterior chest wall ribs two and three level  ;  Surgeon: Miquel Duarte MD;  [facility];  Service: Neurosurgery   • REDUCTION MAMMAPLASTY Bilateral    • WRIST SURGERY Right     x3 2006, 2008  "    Family History   Problem Relation Age of Onset   • Kidney cancer Mother 66   • No Known Problems Father    • No Known Problems Sister    • No Known Problems Daughter    • No Known Problems Maternal Grandmother    • Colon cancer Maternal Grandfather 39   • No Known Problems Paternal Grandmother    • Cancer Paternal Grandfather    • Thyroid cancer Brother 40   • No Known Problems Maternal Aunt    • No Known Problems Maternal Aunt    • No Known Problems Paternal Aunt    • Cancer Family         Gastric   • Leukemia Family    • Colon cancer Family    • Pancreatic cancer Family    • Lung cancer Maternal Uncle 61       Objective:  /76 (BP Location: Left arm, Patient Position: Sitting, Cuff Size: Standard)   Pulse 64   Temp 98.3 °F (36.8 °C) (Temporal)   Ht 5' 3\" (1.6 m)   Wt 68.3 kg (150 lb 9.6 oz)   LMP  (LMP Unknown)   SpO2 98%   BMI 26.68 kg/m²        Physical Exam  Vitals reviewed.   Constitutional:       General: She is not in acute distress.  Cardiovascular:      Rate and Rhythm: Normal rate and regular rhythm.   Neurological:      Mental Status: She is alert.         "

## 2024-02-28 NOTE — PRE-PROCEDURE INSTRUCTIONS
Pre-Surgery Instructions:   Medication Instructions    atorvastatin (LIPITOR) 10 mg tablet Take night before surgery    butalbital-acetaminophen-caffeine (FIORICET,ESGIC) -40 mg per tablet Uses PRN- DO NOT take day of surgery    dexlansoprazole (DEXILANT) 60 MG capsule Take day of surgery.    Multiple Vitamin (MULTIVITAMIN) tablet Stop taking 7 days prior to surgery.    naloxone (NARCAN) 4 mg/0.1 mL nasal spray Uses PRN- OK to take day of surgery    Nutritional Supplements (Menopause Formula) TABS Stop taking 7 days prior to surgery.   Medication instructions for day surgery reviewed. Please use only a sip of water to take your instructed medications. Avoid all over the counter vitamins, supplements and NSAIDS for one week prior to surgery per anesthesia guidelines. Tylenol is ok to take as needed.     You will receive a call one business day prior to surgery with an arrival time and hospital directions. If your surgery is scheduled on a Monday, the hospital will be calling you on the Friday prior to your surgery. If you have not heard from anyone by 8pm, please call the hospital supervisor through the hospital  at [phone]. ([place] [phone] or [place] [phone]).    Do not eat or drink anything after midnight the night before your surgery, including candy, mints, lifesavers, or chewing gum. Do not drink alcohol 24hrs before your surgery. Try not to smoke at least 24hrs before your surgery.       Follow the pre surgery showering instructions as listed in the “My Surgical Experience Booklet” or otherwise provided by your surgeon's office. Do not use a blade to shave the surgical area 1 week before surgery. It is okay to use a clean electric clippers up to 24 hours before surgery. Do not apply any lotions, creams, including makeup, cologne, deodorant, or perfumes after showering on the day of your surgery. Do not use dry shampoo, hair spray, hair gel, or any type of hair products.     No  contact lenses, eye make-up, or artificial eyelashes. Remove nail polish, including gel polish, and any artificial, gel, or acrylic nails if possible. Remove all jewelry including rings and body piercing jewelry.     Wear causal clothing that is easy to take on and off. Consider your type of surgery.    Keep any valuables, jewelry, piercings at home. Please bring any specially ordered equipment (sling, braces) if indicated.    Arrange for a responsible person to drive you to and from the hospital on the day of your surgery. Please confirm the visitor policy for the day of your procedure when you receive your phone call with an arrival time.     Call the surgeon's office with any new illnesses, exposures, or additional questions prior to surgery.    Please reference your “My Surgical Experience Booklet” for additional information to prepare for your upcoming surgery.

## 2024-02-29 ENCOUNTER — ANESTHESIA EVENT (OUTPATIENT)
Dept: PERIOP | Facility: HOSPITAL | Age: 49
End: 2024-02-29
Payer: MEDICARE

## 2024-02-29 ENCOUNTER — ANESTHESIA (OUTPATIENT)
Dept: PERIOP | Facility: HOSPITAL | Age: 49
End: 2024-02-29
Payer: MEDICARE

## 2024-02-29 ENCOUNTER — HOSPITAL ENCOUNTER (OUTPATIENT)
Facility: HOSPITAL | Age: 49
Setting detail: OUTPATIENT SURGERY
Discharge: HOME | End: 2024-02-29
Attending: STUDENT IN AN ORGANIZED HEALTH CARE EDUCATION/TRAINING PROGRAM | Admitting: STUDENT IN AN ORGANIZED HEALTH CARE EDUCATION/TRAINING PROGRAM
Payer: MEDICARE

## 2024-02-29 ENCOUNTER — APPOINTMENT (OUTPATIENT)
Dept: RADIOLOGY | Facility: HOSPITAL | Age: 49
End: 2024-02-29
Payer: MEDICARE

## 2024-02-29 VITALS
RESPIRATION RATE: 32 BRPM | HEART RATE: 49 BPM | DIASTOLIC BLOOD PRESSURE: 57 MMHG | SYSTOLIC BLOOD PRESSURE: 117 MMHG | OXYGEN SATURATION: 100 % | TEMPERATURE: 97.8 F

## 2024-02-29 DIAGNOSIS — Z98.890 STATUS POST SURGERY: Primary | ICD-10-CM

## 2024-02-29 PROBLEM — F12.90 CURRENT CANNABIS VAPING ON SOME DAYS: Status: ACTIVE | Noted: 2024-02-29

## 2024-02-29 PROCEDURE — 73620 X-RAY EXAM OF FOOT: CPT

## 2024-02-29 PROCEDURE — 73630 X-RAY EXAM OF FOOT: CPT

## 2024-02-29 PROCEDURE — NC001 PR NO CHARGE: Performed by: STUDENT IN AN ORGANIZED HEALTH CARE EDUCATION/TRAINING PROGRAM

## 2024-02-29 RX ORDER — OXYCODONE HYDROCHLORIDE AND ACETAMINOPHEN 5; 325 MG/1; MG/1
1 TABLET ORAL EVERY 6 HOURS PRN
Status: DISCONTINUED | OUTPATIENT
Start: 2024-02-29 | End: 2024-02-29 | Stop reason: HOSPADM

## 2024-02-29 RX ORDER — FENTANYL CITRATE/PF 50 MCG/ML
25 SYRINGE (ML) INJECTION
Status: DISCONTINUED | OUTPATIENT
Start: 2024-02-29 | End: 2024-02-29 | Stop reason: HOSPADM

## 2024-02-29 RX ORDER — FENTANYL CITRATE 50 UG/ML
INJECTION, SOLUTION INTRAMUSCULAR; INTRAVENOUS AS NEEDED
Status: DISCONTINUED | OUTPATIENT
Start: 2024-02-29 | End: 2024-02-29

## 2024-02-29 RX ORDER — HYDROMORPHONE HCL/PF 1 MG/ML
0.5 SYRINGE (ML) INJECTION
Status: DISCONTINUED | OUTPATIENT
Start: 2024-02-29 | End: 2024-02-29 | Stop reason: HOSPADM

## 2024-02-29 RX ORDER — ONDANSETRON 2 MG/ML
INJECTION INTRAMUSCULAR; INTRAVENOUS AS NEEDED
Status: DISCONTINUED | OUTPATIENT
Start: 2024-02-29 | End: 2024-02-29

## 2024-02-29 RX ORDER — CEFAZOLIN SODIUM 2 G/50ML
2000 SOLUTION INTRAVENOUS ONCE
Status: COMPLETED | OUTPATIENT
Start: 2024-02-29 | End: 2024-02-29

## 2024-02-29 RX ORDER — GLYCOPYRROLATE 0.2 MG/ML
INJECTION INTRAMUSCULAR; INTRAVENOUS AS NEEDED
Status: DISCONTINUED | OUTPATIENT
Start: 2024-02-29 | End: 2024-02-29

## 2024-02-29 RX ORDER — CHLORHEXIDINE GLUCONATE ORAL RINSE 1.2 MG/ML
15 SOLUTION DENTAL ONCE
Status: COMPLETED | OUTPATIENT
Start: 2024-02-29 | End: 2024-02-29

## 2024-02-29 RX ORDER — SCOLOPAMINE TRANSDERMAL SYSTEM 1 MG/1
1 PATCH, EXTENDED RELEASE TRANSDERMAL ONCE
Status: DISCONTINUED | OUTPATIENT
Start: 2024-02-29 | End: 2024-02-29 | Stop reason: HOSPADM

## 2024-02-29 RX ORDER — PROPOFOL 10 MG/ML
INJECTION, EMULSION INTRAVENOUS CONTINUOUS PRN
Status: DISCONTINUED | OUTPATIENT
Start: 2024-02-29 | End: 2024-02-29

## 2024-02-29 RX ORDER — MIDAZOLAM HYDROCHLORIDE 2 MG/2ML
INJECTION, SOLUTION INTRAMUSCULAR; INTRAVENOUS AS NEEDED
Status: DISCONTINUED | OUTPATIENT
Start: 2024-02-29 | End: 2024-02-29

## 2024-02-29 RX ORDER — ONDANSETRON 2 MG/ML
4 INJECTION INTRAMUSCULAR; INTRAVENOUS ONCE AS NEEDED
Status: DISCONTINUED | OUTPATIENT
Start: 2024-02-29 | End: 2024-02-29 | Stop reason: HOSPADM

## 2024-02-29 RX ORDER — EPHEDRINE SULFATE 50 MG/ML
INJECTION INTRAVENOUS AS NEEDED
Status: DISCONTINUED | OUTPATIENT
Start: 2024-02-29 | End: 2024-02-29

## 2024-02-29 RX ORDER — MAGNESIUM HYDROXIDE 1200 MG/15ML
LIQUID ORAL AS NEEDED
Status: DISCONTINUED | OUTPATIENT
Start: 2024-02-29 | End: 2024-02-29 | Stop reason: HOSPADM

## 2024-02-29 RX ORDER — LIDOCAINE HYDROCHLORIDE 20 MG/ML
INJECTION, SOLUTION EPIDURAL; INFILTRATION; INTRACAUDAL; PERINEURAL AS NEEDED
Status: DISCONTINUED | OUTPATIENT
Start: 2024-02-29 | End: 2024-02-29

## 2024-02-29 RX ORDER — SODIUM CHLORIDE, SODIUM LACTATE, POTASSIUM CHLORIDE, CALCIUM CHLORIDE 600; 310; 30; 20 MG/100ML; MG/100ML; MG/100ML; MG/100ML
125 INJECTION, SOLUTION INTRAVENOUS CONTINUOUS
Status: DISCONTINUED | OUTPATIENT
Start: 2024-02-29 | End: 2024-02-29 | Stop reason: HOSPADM

## 2024-02-29 RX ORDER — OXYCODONE HYDROCHLORIDE 10 MG/1
5 TABLET ORAL EVERY 4 HOURS PRN
Qty: 15 TABLET | Refills: 0 | Status: SHIPPED | OUTPATIENT
Start: 2024-02-29 | End: 2024-03-10

## 2024-02-29 RX ADMIN — MIDAZOLAM 2 MG: 1 INJECTION INTRAMUSCULAR; INTRAVENOUS at 12:22

## 2024-02-29 RX ADMIN — OXYCODONE HYDROCHLORIDE AND ACETAMINOPHEN 1 TABLET: 5; 325 TABLET ORAL at 14:16

## 2024-02-29 RX ADMIN — CEFAZOLIN SODIUM 2000 MG: 2 SOLUTION INTRAVENOUS at 12:29

## 2024-02-29 RX ADMIN — FENTANYL CITRATE 50 MCG: 50 INJECTION INTRAMUSCULAR; INTRAVENOUS at 12:31

## 2024-02-29 RX ADMIN — CHLORHEXIDINE GLUCONATE 15 ML: 1.2 RINSE ORAL at 11:24

## 2024-02-29 RX ADMIN — LIDOCAINE HYDROCHLORIDE 60 MG: 20 INJECTION, SOLUTION EPIDURAL; INFILTRATION; INTRACAUDAL; PERINEURAL at 12:28

## 2024-02-29 RX ADMIN — GLYCOPYRROLATE 0.1 MCG: 0.2 INJECTION, SOLUTION INTRAMUSCULAR; INTRAVENOUS at 12:21

## 2024-02-29 RX ADMIN — SODIUM CHLORIDE, SODIUM LACTATE, POTASSIUM CHLORIDE, AND CALCIUM CHLORIDE: .6; .31; .03; .02 INJECTION, SOLUTION INTRAVENOUS at 13:05

## 2024-02-29 RX ADMIN — FENTANYL CITRATE 50 MCG: 50 INJECTION INTRAMUSCULAR; INTRAVENOUS at 12:27

## 2024-02-29 RX ADMIN — EPHEDRINE SULFATE 10 MG: 50 INJECTION, SOLUTION INTRAVENOUS at 12:52

## 2024-02-29 RX ADMIN — SCOPALAMINE 1 PATCH: 1 PATCH, EXTENDED RELEASE TRANSDERMAL at 12:03

## 2024-02-29 RX ADMIN — ONDANSETRON 4 MG: 2 INJECTION INTRAMUSCULAR; INTRAVENOUS at 12:21

## 2024-02-29 RX ADMIN — EPHEDRINE SULFATE 10 MG: 50 INJECTION, SOLUTION INTRAVENOUS at 12:56

## 2024-02-29 RX ADMIN — SODIUM CHLORIDE, SODIUM LACTATE, POTASSIUM CHLORIDE, AND CALCIUM CHLORIDE 125 ML/HR: .6; .31; .03; .02 INJECTION, SOLUTION INTRAVENOUS at 11:25

## 2024-02-29 RX ADMIN — PROPOFOL 120 MCG/KG/MIN: 10 INJECTION, EMULSION INTRAVENOUS at 12:29

## 2024-02-29 NOTE — ANESTHESIA POSTPROCEDURE EVALUATION
Post-Op Assessment Note    CV Status:  Stable  Pain Score: 0    Pain management: adequate       Mental Status:  Arousable and sleepy   Hydration Status:  Euvolemic   PONV Controlled:  Controlled   Airway Patency:  Patent     Post Op Vitals Reviewed: Yes    No anethesia notable event occurred.    Staff: CRNA               BP   106/52   Temp 97.8 °F (36.6 °C) (02/29/24 1324)    Pulse 67   Resp 18   SpO2 99

## 2024-02-29 NOTE — OP NOTE
OPERATIVE REPORT - Podiatry  PATIENT NAME: Shweta Nolan    :  1975  MRN: [id number]  Pt Location: [facility]    SURGERY DATE: 2024    Surgeons and Role:     * Sri Herring DPM - Primary    Pre-op Diagnosis:  Toe pain, right [M79.674]  Exostosis [M89.8X9]    Post-Op Diagnosis Codes:     * Toe pain, right [M79.674]     * Exostosis [M89.8X9]    Procedure(s) (LRB):  EXCISION EXOSTOSIS 5TH TOE (Right)    Specimen(s):  * No specimens in log *    Estimated Blood Loss:   Minimal    Drains:  * No LDAs found *    Anesthesia Type:   Choice with 2 ml of 1% Lidocaine and 0.25% Bupivacaine in a 1:1 mixture    Hemostasis:  Right pneumatic ankle tourniquet at 250 mmHg for 29 minutes    Materials:  * No implants in log *  4-0 Vicryl and a 4-0 nylon    Operative Findings:  Consistent with the diagnosis.    Complications:   None    Procedure and Technique:     Under mild sedation, the patient was brought into the operating room and placed on the operating room table in the supine position. IV sedation was achieved by anesthesia team and a universal timeout was performed where all parties are in agreement of correct patient, correct procedure and correct site. A pneumatic tourniquet was then placed over the patient's right lower extremity with ample padding. A digital block was performed consisting of 2 ml of 1% Lidocaine and 0.25% Bupivacaine in a 1:1 mixture. The foot was then prepped and draped in the usual aseptic manner. An esmarch bandage was used to exsangunate the foot and the pneumatic tourniquet was then inflated to 250mmHg.    Attention was directed to the right lateral fifth digit where utilizing a #15 blade a skin incision was made down to the subcutaneous tissue.  The incision was then further deepened with sharp and blunt dissection.are was taken to identify retract all the vital neurovascular structures.  Incision was then carried down to the level of bone and soft tissue attachment to the distal  "phalanx was reflected off and flap was created for proper distal phalanx exposure.  A dorsal lateral distal phalanx and middle phalanx was exposed and adequate amount of bone was resected using rongeur.  C-arm fluoroscopy was utilized to confirm the bone resection to the distal phalanx and middle phalanx head.  The lateral fifth phalanges were smoothed and using a rasp.  The incision site was then rinsed with copious amount of normal sterile saline.  The area was then again examined and appropriate amount of bone was resected.  The subcutaneous tissue was then reapproximated utilizing a 4-0 Vicryl and the skin edges were re-approximated utilizing a 4-0 nylon horizontal mattress fashion.  The incision was then dressed with Xeroform, 4 x 4 gauze Coban.     The tourniquet was deflated at approximately 29 min and normal hyperemic response was noted to all digits. The patient tolerated the procedure and anesthesia well without immediate complications and transferred to PACU with vital signs stable.      I was present for the entire procedure.    SIGNATURE: Sri Herring DPM  DATE: February 29, 2024  TIME: 2:45 PM      Portions of the record may have been created with voice recognition software. Occasional wrong word or \"sound a like\" substitutions may have occurred due to the inherent limitations of voice recognition software. Read the chart carefully and recognize, using context, where substitutions have occurred.    "

## 2024-02-29 NOTE — DISCHARGE INSTR - AVS FIRST PAGE
Dr. Sri Herring  Post-op surgery Instructions    Pain / Swelling  There is expected to be some discomfort, swelling and bruising of the foot. You might see some blood on the bandage. This is not a cause for alarm. However, if there is active or persistent bleeding (blood running out of the bandage while at rest) - call the office at once (or) go to a [facility] ER and ask them to page the podiatry residents.  Apply an ice bag to the top of your ankle for 30 minutes for each waking hour, for the first 72 hours. This should be discontinued when sleeping. This will also work through your cast if you have one. Ice must not leak and wet the dressings. Also, using the ice inappropriately can cause permanent nerve damage.  Your foot should be elevated as much as possible for the first 72 hours. The foot should be above heart level. If your foot is below heart level, throbbing and pain will increase.  When sleeping, elevation can be accomplished by putting a small hard suitcase between the box spring and mattress at the foot of the bed.  Walking and standing will increase pain, throbbing and bleeding.  Persistent pain despite elevation and your pain meds can many times be relieved by removing the tight brown compression layer (called the ACE wrap) that is over the white gauze dressing. If you are elevating and taking your pain meds and pain is still severe, remove this brown stretchy layer but leave the gauze intact. Wait 30 minutes. If the pain subsides, reapply the ACE so it’s not so tight. If pain doesn’t get better, call your doctor.   Dressings / Casts  Do not remove your surgical dressings - they will be changed at your doctor appointment. Do not allow surgical dressings to get wet. Sponge baths should be used until the sutures are removed.  Do not try to keep the foot dry using a garbage bag and tape - this rarely works.  If you get your dressings or cast wet - call your doctor immediately.  If your cast or  dressings feel tight - elevate your foot for 30 minutes. If this doesn’t help and you feel tingling or see toe discoloration - call your doctor or go to a [facility] ER and ask them to page the podiatry residents.   Do not put things in your cast such as powder, coat hangers to scratch, etc.. This can cause skin damage and infections.   Infection  If you have a fever at or above 100 degrees, chills, sweats, or see red streaks rising above the dressing or smell odor / see pus (creamy white drainage), call your doctor immediately or go to a [facility] ER and ask them to page the podiatry residents.  Constipation  If you have severe constipation after surgery, this can be due to the pain medication. Notify your doctor and special medication will be prescribed to deal with this.   Blood Clots  If you had surgery and are in a cast, have an external fixation device, or are non-weightbearing using crutches, a knee scooter, a wheelchair, a walker, or an iWalk device - you need to be on a blood thinner. Your doctor will prescribe one of the regimens below. If you run out of the blood thinner checked off below before you are walking normally on your foot and out of your cast - notify your doctor immediately so you can get a refill. Not doing so can lead to blood clots and serious complications including death.        Numbness  It is normal for your foot to be numb until about dinner time. If you’ve had a popliteal block procedure, you might be numb until the following day. When you start to feel pins and needles in the foot - this means the block is wearing off. That is the appropriate time to take your pain medication.   Pain Medication  Do not supplement your pain medication with over the counter drugs, old leftover pain medications, or extra Tylenol. You must discuss any additional medications with your doctor prior to taking them for pain.   Driving  No driving is allowed without discussion with the  doctor  Ambulation  Weight bear as tolerated to surgical foot with surgical shoe   If given a flat, stiff shoe / darco wedge shoe, Do not walk at all without it.  Use a device (cane, walker, crutches) to take some weight off of the foot when walking  If instructed not to put weight on the surgical foot, use the following:    Putting weight on the foot will lead to complications.

## 2024-02-29 NOTE — H&P
H&P Exam - Shweta JOE Nolan 48 y.o. female MRN: [id number]    Unit/Bed#: OR [place] Encounter: [id number]    Assessment:  This is a 48 y.o. female, with a has a past medical history of Abdominal pain, Acute cystitis with hematuria, COVID-19 virus infection, Esophageal perforation, Gastric leak, GERD, Headache, Idiopathic intracranial hypertension, No blood products, PONV, Postgastrectomy malabsorption, Presence of lumboperitoneal shunt, Rotator cuff tendinitis, Visual field defect, exostosis of the right 5th toe. -- WHO PRESENTED to exostosis of the right 5th toe.    Plan:  - Patient is appropriately optimized from a medical perspective for repair of right 5th toe exostosis.      History of Present Illness   48 y.o. female, with a has a past medical history of Abdominal pain, Acute cystitis with hematuria, COVID-19 virus infection, Esophageal perforation, Gastric leak, GERD, Headache, Idiopathic intracranial hypertension, No blood products, PONV, Postgastrectomy malabsorption, Presence of lumboperitoneal shunt, Rotator cuff tendinitis, Visual field defect, exostosis of the right 5th toe. -- WHO PRESENTED to exostosis of the right 5th toe.    The patient reports she is in her usual state of health and that this is an elective procedure. She denies any increase in use of vaping products, changes in her shunt use for treatment of IIH. She desires to proceed with correction of her right 5th toe exostosis.    Review of Systems   Constitutional: Negative.    HENT: Negative.     Eyes: Negative.    Respiratory: Negative.     Cardiovascular: Negative.    Gastrointestinal: Negative.    Endocrine: Negative.    Genitourinary: Negative.    Musculoskeletal:         Pain of the lateral aspect of the right 5th digit   Skin: Negative.    Allergic/Immunologic: Negative.    Neurological: Negative.    Hematological: Negative.    Psychiatric/Behavioral:  The patient is nervous/anxious.        Historical Information   Past Medical History:    Diagnosis Date    Abdominal pain     Acute cystitis with hematuria 01/03/2020    Brain condition     Pseudotumor Cerebri     Chronic kidney disease     COVID-19 virus infection 11/10/2022    De Quervain's disease (tenosynovitis)     Esophageal perforation     Gastric leak     GERD (gastroesophageal reflux disease)     Headache     Idiopathic intracranial hypertension     Kidney stone     Moderate protein-calorie malnutrition (HCC) 02/21/2018    No blood products     per pt: personal and Anab[employer]ist beliefs. surgeon office aware 12/13/19    Papilledema, both eyes     PONV (postoperative nausea and vomiting)     Postgastrectomy malabsorption     Presence of lumboperitoneal shunt     Resolved: Sep 20, 2017    Rotator cuff tendinitis     Resolved: Aug 23, 2017    Visual field defect      Past Surgical History:   Procedure Laterality Date    BONE EXOSTOSIS EXCISION Right 11/16/2023    Procedure: EXCISION EXOSTOSIS 5TH TOE RIght;  Surgeon: Sri Herring DPM;  [facility]: [facility];  Service: Podiatry    BREAST BIOPSY Left 1993    benign    CSF SHUNT      LP shunt - 2015 -  shunt - 2017    ESOPHAGOGASTRODUODENOSCOPY N/A 02/23/2018    Procedure: ESOPHAGOGASTRODUODENOSCOPY (EGD) WITH ESOPHAGEAL STENT PLACEMENT;  Surgeon: Sergio Chung MD;  [facility];  Service: Thoracic    ESOPHAGOGASTRODUODENOSCOPY N/A 02/23/2018    Procedure: ESOPHAGOGASTRODUODENOSCOPY (EGD) WITH REMOVAL ESOPHAGEAL STENT  AND REPLACEMENT WITH 23mm X155mm WALLFLEX ESOPHAGEAL STENT;  Surgeon: Sergio Chung MD;  [facility];  Service: Thoracic    ESOPHAGOGASTRODUODENOSCOPY N/A 03/28/2018    Procedure: ESOPHAGOGASTRODUODENOSCOPY (EGD) with PEJ placement.;  Surgeon: Andrea Gonzales MD;  [facility]: [facility];  Service: Gastroenterology    ESOPHAGOGASTRODUODENOSCOPY N/A 04/05/2018    Procedure: ESOPHAGOGASTRODUODENOSCOPY (EGD) with botox injection and kaofed placement;  Surgeon: Naomie Leroy DO;  [facility];  Service:  "Gastroenterology    ESOPHAGOGASTRODUODENOSCOPY N/A 04/10/2018    Procedure: ESOPHAGOGASTRODUODENOSCOPY (EGD) with Kaofed placement;  Surgeon: Saroj Kirkland MD;  [facility];  Service: Gastroenterology    ESOPHAGOSCOPY WITH STENT INSERTION N/A 01/24/2018    Procedure: INSERTION STENT ESOPHAGEAL;  Surgeon: Gonsalo Yang MD;  [facility];  Service: Gastroenterology    EYE SURGERY Bilateral 1980    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)     FL RETROGRADE PYELOGRAM  06/24/2020    FL RETROGRADE PYELOGRAM  08/21/2021    FL RETROGRADE PYELOGRAM  5/4/2023    FL RETROGRADE PYELOGRAM  5/18/2023    GASTRIC BYPASS  12/19/2016    Lap sleeve gastrectomy w/shunt length shortening procedure    HIATAL HERNIA REPAIR      HYSTERECTOMY  03/14/2013    IR LUMBAR PUNCTURE  08/29/2018    LAPAROTOMY N/A 01/25/2018    Procedure: Exploratory Laparotomy, wash out,placement of drains, placement of NG feeding tube ;  Surgeon: Ruperto Jasso DO;  [facility];  Service: General    LUMBAR PERITONEAL SHUNT  11/19/2015    Laparoscopic assisted    WY BREAST REDUCTION Bilateral 03/25/2022    Procedure: BILATERAL BREAST REDUCTION;  Surgeon: Indio Williamson MD;  [facility];  Service: Plastics    WY CRTJ SHUNT [id number]-AUR Right 12/18/2019    Procedure: IMAGE GUIDED INSERTION OF RIGHT CORONAL VENTRICULAR-ATRIAL SHUNT;  Surgeon: Edouard Pires MD;  [facility];  Service: Neurosurgery    WY CRTJ SHUNT [id number] TERMINUS Right 05/31/2017    Procedure: IMAGE GUIDED CORONAL PLACEMENT OF PROGRAMABLE VENTRICULAR-PERITONEAL SHUNT, REMOVAL OF LP SHUNT ;  Surgeon: Edouard Pires MD;  [facility];  Service: Neurosurgery    WY CYSTO BLADDER W/URETERAL CATHETERIZATION N/A 06/06/2020    Procedure: Bilateral CYSTOSCOPY RETROGRADE PYELOGRAM WITH INSERTION STENT URETERAL;  Surgeon: Moe Mcgrath MD;  Location: [facility];  Service: Urology    WY CYSTO BLADDER W/URETERAL " CATHETERIZATION Bilateral 5/4/2023    Procedure: CYSTOSCOPY RETROGRADE PYELOGRAM WITH INSERTION STENT URETERAL;  Surgeon: Moe Mcgrath MD;  [facility];  Service: Urology    LA CYSTO/URETERO W/LITHOTRIPSY &INDWELL STENT INSRT Bilateral 06/24/2020    Procedure: CYSTOSCOPY URETEROSCOPY WITH LITHOTRIPSY HOLMIUM LASER, RETROGRADE PYELOGRAM AND exchange bilateral  STENTs URETERAL;  Surgeon: Mark Godfrey MD;  [facility];  Service: Urology    LA CYSTO/URETERO W/LITHOTRIPSY &INDWELL STENT INSRT Left 08/21/2021    Procedure: CYSTOSCOPY; LEFT URETEROSCOPY WITH RETROGRADE PYELOGRAM, REMOVAL OF STONE AND INSERTION LEFT URETERAL STENT;  Surgeon: Jamie Nuñez MD;  [facility];  Service: Urology    LA CYSTO/URETERO W/LITHOTRIPSY &INDWELL STENT INSRT N/A 5/18/2023    Procedure: CYSTOSCOPY URETEROSCOPY WITH LITHOTRIPSY HOLMIUM LASER, RETROGRADE PYELOGRAM, REMOVAL OF BILATERAL STENTS,  AND INSERTION STENT URETERAL LEFT ,BASKET EXTRACTION OF STONE LEFT SIDE;  Surgeon: Jamie Nuñez MD;  [facility];  Service: Urology    LA EGD TRANSORAL BIOPSY SINGLE/MULTIPLE N/A 06/27/2018    Procedure: ESOPHAGOGASTRODUODENOSCOPY (EGD) with padlock clip placement;  Surgeon: Lisbet Gonzalez MD;  [facility];  Service: Bariatrics    LA RMVL COMPL CSF SHUNT SYSTEM W/O RPLCMT SHUNT Right 02/05/2018    Procedure: Removal of  shunt;  Surgeon: Edouard Pires MD;  [facility];  Service: Neurosurgery    LA RPLCMT/REVJ CSF SHUNT VALVE/CATH SHUNT SYS Right 01/25/2018    Procedure: Externalization of right-sided SHUNT VENTRICULAR-PERITONEAL in anterior chest wall ribs two and three level  ;  Surgeon: Miquel Duarte MD;  [facility];  Service: Neurosurgery    REDUCTION MAMMAPLASTY Bilateral     WRIST SURGERY Right     x3 2006, 2008     Social History   Social History     Substance and Sexual Activity   Alcohol Use Never     Social History     Substance and Sexual Activity    Drug Use Not Currently    Frequency: 7.0 times per week    Types: Marijuana    Comment: medical marijuana, vaping & topical Last used 11/10/23     Social History     Tobacco Use   Smoking Status Former    Current packs/day: 0.00    Average packs/day: 0.5 packs/day for 20.6 years (10.3 ttl pk-yrs)    Types: Cigarettes    Start date: 1992    Quit date: 2012    Years since quittin.5   Smokeless Tobacco Never     E-Cigarette Use: Current Every Day User     E-Cigarette/Vaping Substances    Nicotine No     THC Yes     CBD Yes     Flavoring No     Other Yes lotion    Unknown No        Family History: non-contributory    Meds/Allergies   all medications and allergies reviewed  Allergies   Allergen Reactions    Benadryl [Diphenhydramine] Anaphylaxis     Throat closing    Phenergan [Promethazine] Anaphylaxis    Nsaids Other (See Comments)     Avoids due to Hx Gastric Sleeve       Objective   First Vitals:   Blood Pressure: 134/64 (24 1120)  Pulse: (!) 54 (24 1120)  Temperature: (!) 97.1 °F (36.2 °C) (24 1120)  Temp Source: Temporal (24 1120)  Respirations: 18 (24 1120)  SpO2: 100 % (24 1120)    Current Vitals:   Blood Pressure: 134/64 (24 1120)  Pulse: (!) 54 (24 1120)  Temperature: (!) 97.1 °F (36.2 °C) (24 1120)  Temp Source: Temporal (24 1120)  Respirations: 18 (24 1120)  SpO2: 100 % (24 1120)    No intake or output data in the 24 hours ending 24 1141    Invasive Devices       Peripheral Intravenous Line  Duration             Peripheral IV 23 Dorsal (posterior);Right Hand 105 days    Peripheral IV 24 Right;Dorsal (posterior) Wrist <1 day                    Physical Exam  Constitutional:       Appearance: Normal appearance. She is normal weight.   HENT:      Head: Normocephalic and atraumatic.      Nose: Nose normal.      Mouth/Throat:      Mouth: Mucous membranes are moist.   Eyes:      Extraocular Movements:  "Extraocular movements intact.   Cardiovascular:      Rate and Rhythm: Normal rate.   Pulmonary:      Effort: Pulmonary effort is normal.   Abdominal:      Palpations: Abdomen is soft.   Musculoskeletal:      Cervical back: Normal range of motion and neck supple.   Skin:     General: Skin is warm and dry.      Capillary Refill: Capillary refill takes less than 2 seconds.   Neurological:      General: No focal deficit present.      Mental Status: She is alert.         Lab Results:   Results from Last 12 Months   Lab Units 02/17/24  1133 12/08/23  1438 11/06/23  1236 05/04/23  0144 05/03/23  1739   SODIUM mmol/L 140 137 141   < > 137   POTASSIUM mmol/L 4.0 3.7 4.1   < > 4.6   CHLORIDE mmol/L 102 102 106   < > 103   CO2 mmol/L 32 29 27   < > 26   ANION GAP mmol/L 6 6 8   < > 8   BUN mg/dL 18 13 24   < > 14   CREATININE mg/dL 0.70 0.65 0.65   < > 0.72   CALCIUM mg/dL 10.1 9.6 9.3   < > 9.5   GLUCOSE RANDOM mg/dL  --  79 82   < > 110   MAGNESIUM mg/dL  --   --   --   --  2.1   AST U/L 16 14 12*   < >  --    ALT U/L 18 11 10   < >  --    ALK PHOS U/L 60 59 67   < >  --    TOTAL BILIRUBIN mg/dL 0.53 0.51 0.35   < >  --    ALBUMIN g/dL 4.5 4.5 4.2   < >  --     < > = values in this interval not displayed.            Results from Last 12 Months   Lab Units 02/17/24  1133 12/08/23  1438   WBC Thousand/uL 5.51 5.97   HEMOGLOBIN g/dL 16.7* 16.1*   HEMATOCRIT % 49.6* 48.4*   PLATELETS Thousands/uL 187 202      No results for input(s): \"APTT\", \"INR\", \"PTT\" in the last 8784 hours.          Imaging:   ECHOCARDIOGRAPHY AND OTHER TESTING/IMAGING  1/2018  SUMMARY     LEFT VENTRICLE:  Systolic function was normal. Ejection fraction was estimated to be 65 %.  There were no regional wall motion abnormalities.     HISTORY: PRIOR HISTORY: S/P  Shunt, Peritonitis, Gastric Leak  EKG, Pathology, and Other Studies:     ECG       Encounter Date: 02/17/24   ECG 12 lead   Result Value     Ventricular Rate 51     Atrial Rate 51     AZ Interval " 144     QRSD Interval 84     QT Interval 432     QTC Interval 398     P Axis 18     QRS Axis 0     T Wave Axis -5     Narrative     Sinus bradycardia  Nonspecific T wave abnormality  Abnormal ECG  When compared with ECG of 03-JAN-2022 14:11,  Limb leads are now properly placed   Confirmed by Sergio Escalera ([id number]) on 2/17/2024 5:50:36 PM      No results found for this or any previous visit.    Code Status: Prior  Advance Directive and Living Will:      Power of :    POLST:      Counseling / Coordination of Care:   Total floor / unit time spent today 15 minutes. and Greater than 50% of total time was spent with the patient and / or family counseling and / or coordination of care.  A description of the counseling / coordination of care: Management of IIH.

## 2024-02-29 NOTE — ANESTHESIA PREPROCEDURE EVALUATION
Procedure:  EXCISION EXOSTOSIS 5TH TOE (Right: Foot)    Relevant Problems   ANESTHESIA   (+) PONV (postoperative nausea and vomiting)      CARDIO   (+) Hypercholesteremia   (+) Migraine with aura and without status migrainosus, not intractable   (+) Murmur, cardiac      GI/HEPATIC   (+) Gastroesophageal reflux disease      /RENAL   (+) Obstruction of left ureteropelvic junction (UPJ) due to stone   (+) Renal calculi      HEMATOLOGY   (+) Iron deficiency anemia secondary to inadequate dietary iron intake      NEURO/PSYCH   (+) Chronic tension-type headache, intractable   (+) Migraine with aura and without status migrainosus, not intractable   (+) PTSD (post-traumatic stress disorder)      Digestive   (+) Postgastrectomy malabsorption      Nervous and Auditory   (+) Pseudotumor cerebri      Other   (+) Bariatric surgery status   (+) Current cannabis vaping on some days   (+) History of idiopathic intracranial hypertension   (+) Refusal of blood product   (+) S/P  shunt   (+) S/P bilateral breast reduction        Physical Exam    Airway    Mallampati score: II  TM Distance: >3 FB  Neck ROM: full     Dental   No notable dental hx     Cardiovascular      Pulmonary      Other Findings  post-pubertal.      Anesthesia Plan  ASA Score- 2     Anesthesia Type- IV sedation with anesthesia with ASA Monitors.         Additional Monitors:     Airway Plan:            Plan Factors-Exercise tolerance (METS): >4 METS.    Chart reviewed. EKG reviewed. Imaging results reviewed. Existing labs reviewed. Patient summary reviewed.    Patient is a current smoker.              Induction- intravenous.    Postoperative Plan-     Informed Consent- Anesthetic plan and risks discussed with patient.  I personally reviewed this patient with the CRNA. Discussed and agreed on the Anesthesia Plan with the CRNA..              VITALS  LMP  (LMP Unknown)   BP Readings from Last 3 Encounters:   02/20/24 128/76   02/12/24 167/96   01/04/24 152/73  "    LABS  Results from Last 12 Months   Lab Units 02/17/24  1133 12/08/23  1438 11/06/23  1236 05/04/23  0144 05/03/23  1739   SODIUM mmol/L 140 137 141   < > 137   POTASSIUM mmol/L 4.0 3.7 4.1   < > 4.6   CHLORIDE mmol/L 102 102 106   < > 103   CO2 mmol/L 32 29 27   < > 26   ANION GAP mmol/L 6 6 8   < > 8   BUN mg/dL 18 13 24   < > 14   CREATININE mg/dL 0.70 0.65 0.65   < > 0.72   CALCIUM mg/dL 10.1 9.6 9.3   < > 9.5   GLUCOSE RANDOM mg/dL  --  79 82   < > 110   MAGNESIUM mg/dL  --   --   --   --  2.1   AST U/L 16 14 12*   < >  --    ALT U/L 18 11 10   < >  --    ALK PHOS U/L 60 59 67   < >  --    TOTAL BILIRUBIN mg/dL 0.53 0.51 0.35   < >  --    ALBUMIN g/dL 4.5 4.5 4.2   < >  --     < > = values in this interval not displayed.     Results from Last 12 Months   Lab Units 02/17/24  1133 12/08/23  1438   WBC Thousand/uL 5.51 5.97   HEMOGLOBIN g/dL 16.7* 16.1*   HEMATOCRIT % 49.6* 48.4*   PLATELETS Thousands/uL 187 202     No results for input(s): \"APTT\", \"INR\", \"PTT\" in the last 8784 hours.    ECG  Encounter Date: 02/17/24   ECG 12 lead   Result Value    Ventricular Rate 51    Atrial Rate 51    ME Interval 144    QRSD Interval 84    QT Interval 432    QTC Interval 398    P Axis 18    QRS Axis 0    T Wave Axis -5    Narrative    Sinus bradycardia  Nonspecific T wave abnormality  Abnormal ECG  When compared with ECG of 03-JAN-2022 14:11,  Limb leads are now properly placed   Confirmed by Sergio Escalera ([id number]) on 2/17/2024 5:50:36 PM     No results found for this or any previous visit.    ECHOCARDIOGRAPHY AND OTHER TESTING/IMAGING  1/2018  SUMMARY     LEFT VENTRICLE:  Systolic function was normal. Ejection fraction was estimated to be 65 %.  There were no regional wall motion abnormalities.     HISTORY: PRIOR HISTORY: S/P  Shunt, Peritonitis, Gastric Leak    ANESTHESIA RISK-BENEFIT DISCUSSION  BENEFITS INCLUDE (NBK [id number], PMID 82140721):   (1) A specialized anesthesia team reduces mortality and morbidity for major " surgeries.  (2) The team provides analgesia/sedation/amnesia/akinesia as safely as possible.  (3) The team strives to reduce discomfort as much as reasonably possible.    RISKS ASSESSMENT (AND PLANS TO MITIGATE RISKS) INCLUDE:      Neurologic system: IntraOp awareness (Risk is ~1:1,000 - 1:14,000; PMID 56225038), Stroke (Risk ~<0.1-2% for most cases; PMID 84946196), nerve injury, vision loss, and POCD.  Since regional anesthesia may be used, risks of block failure, bleeding, infection, and nerve injury were discussed.  Airway and Pulmonary system: Dental or mouth injury, throat pain, critical hypoxia, pneumothorax, prolonged intubation, post-op respiratory compromise.  Airway/Intubation risks and prior data: Mask ventilation not attempted (0); Technique: Direct laryngoscopy, Stylet; Type: Cuffed, Oral, Pre-curved, Inflated; Tube Size: 7 mm; Laryngoscope: Mac; Blade Size: 3; Location: Oral; Grade View: 1; Insertion Attempts: 1   Major ARISCAT risk factors for pulmonary complications include: none, yielding a score 0-1= Low risk, 1.6%.  Cardiovascular system: Hypotension/Vasoplegia, arrhythmias, blood clot, buzz-op cardiac injury/MACE, bleeding, infection, or injury to vascular structures.  Signs of active, severe cardiac instability: none  Ata's RCRI score items: none, yielding an RCRI Score of 0= 0.4% risk of MACE  Are buzz-op or intra-op beta blockers indicated? (PMID 73502273): no  FEN/GI system: Aspiration risk (~0.5% [facility] [id number]) and PONV (10-80% per Apfel score).  ASA NPO guideline compliance?: Yes  Medication risk assessment: Allergic reactions, bleeding due to anticoagulant use, overdoses, drug-drug interactions, injury to a fetus or  in pregnant or breastfeeding patients, sedation while driving/operating heavy machinery.  Recent relevant medications: See MAR or Med Review  Personal or family history of anesthesia complications: yes: PONV  Pregnancy Status: N/A  Estimate mortality risks associated with  anesthesia based on ASA-PS (PMID 22911677): ASA-PS II: risk 1:20,000

## 2024-02-29 NOTE — DISCHARGE SUMMARY
Discharge Summary Outpatient Procedure Podiatry -   Shweta JOE Nolan 48 y.o. female MRN: [id number]  Unit/Bed#: OR [place] Encounter: [id number]    Admission Date: 2/29/2024     Admitting Diagnosis: Toe pain, right [M79.674]  Exostosis [M89.8X9]    Discharge Diagnosis: same    Procedures Performed: EXCISION EXOSTOSIS 5TH TOE:     Complications: none    Condition at Discharge: stable    Discharge instructions/Information to patient and family:   See after visit summary for information provided to patient and family.      Provisions for Follow-Up Care/Important appointments:  See after visit summary for information related to follow-up care and any pertinent home health orders.      Discharge Medications:  See after visit summary for reconciled discharge medications provided to patient and family.

## 2024-03-08 ENCOUNTER — OFFICE VISIT (OUTPATIENT)
Dept: PODIATRY | Facility: CLINIC | Age: 49
End: 2024-03-08

## 2024-03-08 VITALS
DIASTOLIC BLOOD PRESSURE: 79 MMHG | HEIGHT: 63 IN | WEIGHT: 150 LBS | HEART RATE: 65 BPM | BODY MASS INDEX: 26.58 KG/M2 | SYSTOLIC BLOOD PRESSURE: 142 MMHG

## 2024-03-08 DIAGNOSIS — Z98.890 STATUS POST RIGHT FOOT SURGERY: Primary | ICD-10-CM

## 2024-03-08 PROCEDURE — 99024 POSTOP FOLLOW-UP VISIT: CPT | Performed by: STUDENT IN AN ORGANIZED HEALTH CARE EDUCATION/TRAINING PROGRAM

## 2024-03-08 NOTE — PROGRESS NOTES
PATIENT:  Shweta Nolan      1975    ASSESSMENT     1. Status post right foot surgery               PLAN  Patient is doing well post-operatively.  Sutures left intact. Incision was cleaned with betadine and DSD applied to be kept C/D/I.  Continue post-op care as instructed.  Stressed on patient compliance about proper off-loading, staying off of feet, and proper dressing care.  Call if any increase in pain, fevers, calf pain, shortness of breath, or general distress is noted. Patient instructed to go to ER if call is not returned immediately.  Return in 1 week for suture removal      HISTORY OF PRESENT ILLNESS  Patient presents for post-op appointment S/p right 5th toe excision of exostosis date of surgery 2/29/2024.  Post-op pain is under control and resolving well.  The patient is feeling well and in good spirits.  Patient reported no post-op concern.  Patient relates compliance with surgical shoe, elevation, and keeping dressing clean, dry and intact.    REVIEW OF SYSTEMS  GENERAL: No fever or chills.    HEART: No chest pain, or palpitation  RESPIRATORY:  No SOB or cough  GI: No Nausea, vomit or diarrhea  NEUROLOGIC: No syncope or acute weakness  MUSCULOSKELETAL: No calf pain or edema.      PHYSICAL EXAMINATION  GENERAL  The patient appears in NAD / non-toxic. Afebrile. VSS    VASCULAR EXAM  Pedal pulses and vascular status are intact.  No calf pain or edema bilaterally.  No cyanosis.    DERMATOLOGIC EXAM  Incision is coapted and healing well.  No signs of infection. No active drainage. Normal post-op edema and ecchymosis. No necrosis or dehiscence.    NEUROLOGIC EXAM  AAO X 3.  No focal neurologic deficit.  Neurologic status is intact BLE.    MUSCULOSKELETAL EXAM  Good surgical correction.  Normal post-op findings. ROM intact.  No fluctuation or crepitus.

## 2024-03-13 ENCOUNTER — OFFICE VISIT (OUTPATIENT)
Dept: PODIATRY | Facility: CLINIC | Age: 49
End: 2024-03-13

## 2024-03-13 VITALS
BODY MASS INDEX: 26.93 KG/M2 | SYSTOLIC BLOOD PRESSURE: 132 MMHG | DIASTOLIC BLOOD PRESSURE: 75 MMHG | HEIGHT: 63 IN | HEART RATE: 70 BPM | WEIGHT: 152 LBS

## 2024-03-13 DIAGNOSIS — Z98.890 STATUS POST RIGHT FOOT SURGERY: Primary | ICD-10-CM

## 2024-03-13 PROCEDURE — 99024 POSTOP FOLLOW-UP VISIT: CPT | Performed by: STUDENT IN AN ORGANIZED HEALTH CARE EDUCATION/TRAINING PROGRAM

## 2024-03-14 NOTE — PROGRESS NOTES
Assessment/Plan:    No problem-specific Assessment & Plan notes found for this encounter.       Diagnoses and all orders for this visit:    Status post right foot surgery      Plan:     - Patient was counseled and educated on the condition and the diagnosis.  The diagnosis, treatment options and prognosis were discussed in detail.   -Right foot incision site appears stable, sutures removed incision site has healed.  I recommended close monitoring.   -Weight-bear as tolerated in sneakers  -Addressed all questions and concerns  -Return in 4 weeks for recheck    Subjective:      Patient ID: Shweta Nolna is a 48 y.o. female.    Patient presents for post-op appointment S/p right 5th toe excision of exostosis date of surgery 2/29/2024.  Patient reports she is doing well without any discomfort to the toe.  She has no other complaints at this time.        The following portions of the patient's history were reviewed and updated as appropriate: She  has a past medical history of Abdominal pain, Acute cystitis with hematuria (01/03/2020), Brain condition, Chronic kidney disease, COVID-19 virus infection (11/10/2022), De Quervain's disease (tenosynovitis), Esophageal perforation, Gastric leak, GERD (gastroesophageal reflux disease), Headache, Idiopathic intracranial hypertension, Kidney stone, Moderate protein-calorie malnutrition (HCC) (02/21/2018), No blood products, Papilledema, both eyes, PONV (postoperative nausea and vomiting), Postgastrectomy malabsorption, Presence of lumboperitoneal shunt, Rotator cuff tendinitis, and Visual field defect.  She   Patient Active Problem List    Diagnosis Date Noted    Current cannabis vaping on some days 02/29/2024    Family history of kidney cancer 12/26/2023    Family history of thyroid cancer 12/26/2023    B12 deficiency 07/25/2023    PONV (postoperative nausea and vomiting)     Obstruction of left ureteropelvic junction (UPJ) due to stone 05/03/2023    Chronic idiopathic  constipation 09/23/2022    Macromastia 04/01/2022    S/P bilateral breast reduction 04/01/2022    Annual physical exam 12/17/2021    Iron deficiency anemia secondary to inadequate dietary iron intake 11/09/2021    Migraine with aura and without status migrainosus, not intractable 04/30/2021    Encounter for surgical aftercare following surgery of digestive system 09/24/2020    Papilledema 12/10/2019    Postgastrectomy malabsorption 09/20/2019    History of idiopathic intracranial hypertension 03/08/2019    Chronic tension-type headache, intractable 03/08/2019    PTSD (post-traumatic stress disorder) 01/10/2019    Renal calculi 10/04/2018    Refusal of influenza vaccine by provider 09/28/2018    Bariatric surgery status 06/22/2018    Choroidal nevus, right eye 02/03/2018    Refusal of blood product 01/31/2018    Murmur, cardiac 01/26/2018    S/P  shunt 01/24/2018    Gastroesophageal reflux disease 12/15/2017    Hypercholesteremia 08/11/2017    Pseudotumor cerebri 05/31/2017    Mixed incontinence 04/03/2017    Subacromial bursitis of right shoulder joint 09/16/2015    Family history of malignant neoplasm of gastrointestinal tract 07/09/2013     She  has a past surgical history that includes CSF shunt; Gastric bypass (12/19/2016); Hysterectomy (03/14/2013); pr crtj shunt nylggycvnu-afgknvdjb-eukcemh terminus (Right, 05/31/2017); ESOPHAGOSCOPY WITH STENT INSERTION (N/A, 01/24/2018); Eye surgery (Bilateral, 1980); pr rplcmt/revj csf shunt valve/cath shunt sys (Right, 01/25/2018); LAPAROTOMY (N/A, 01/25/2018); pr rmvl compl csf shunt system w/o rplcmt shunt (Right, 02/05/2018); Esophagogastroduodenoscopy (N/A, 02/23/2018); Esophagogastroduodenoscopy (N/A, 02/23/2018); Esophagogastroduodenoscopy (N/A, 03/28/2018); Esophagogastroduodenoscopy (N/A, 04/05/2018); Esophagogastroduodenoscopy (N/A, 04/10/2018); pr egd transoral biopsy single/multiple (N/A, 06/27/2018); IR lumbar puncture (08/29/2018); Lumbar peritoneal shunt  "(11/19/2015); Wrist surgery (Right); pr crtj shunt vmeaxpgxpt-pko-ujd-aur (Right, 12/18/2019); pr cysto bladder w/ureteral catheterization (N/A, 06/06/2020); Hiatal hernia repair; FL retrograde pyelogram (06/24/2020); pr cysto/uretero w/lithotripsy &indwell stent insrt (Bilateral, 06/24/2020); pr cysto/uretero w/lithotripsy &indwell stent insrt (Left, 08/21/2021); FL retrograde pyelogram (08/21/2021); Breast biopsy (Left, 1993); pr breast reduction (Bilateral, 03/25/2022); Reduction mammaplasty (Bilateral); pr cysto bladder w/ureteral catheterization (Bilateral, 5/4/2023); FL retrograde pyelogram (5/4/2023); pr cysto/uretero w/lithotripsy &indwell stent insrt (N/A, 5/18/2023); FL retrograde pyelogram (5/18/2023); Bone exostosis excision (Right, 11/16/2023); and Bone exostosis excision (Right, 2/29/2024).  Current Outpatient Medications   Medication Sig Dispense Refill    atorvastatin (LIPITOR) 10 mg tablet Take 1 tablet (10 mg total) by mouth daily at bedtime 90 tablet 1    butalbital-acetaminophen-caffeine (FIORICET,ESGIC) -40 mg per tablet take 1 tablet by mouth every 6 hours if needed for headache or migraines -ONLY 10 PER MONTH 10 tablet 3    dexlansoprazole (DEXILANT) 60 MG capsule Take 1 capsule (60 mg total) by mouth daily 90 capsule 3    Multiple Vitamin (MULTIVITAMIN) tablet Take 1 tablet by mouth daily Wears a patch      Nutritional Supplements (Menopause Formula) TABS Take by mouth 2 gummies daily      naloxone (NARCAN) 4 mg/0.1 mL nasal spray Administer 1 spray into a nostril. If no response after 2-3 minutes, give another dose in the other nostril using a new spray. (Patient not taking: Reported on 3/13/2024) 1 each 1     No current facility-administered medications for this visit.   .    Review of Systems   All other systems reviewed and are negative.        Objective:      /75   Pulse 70   Ht 5' 3\" (1.6 m)   Wt 68.9 kg (152 lb)   LMP  (LMP Unknown)   BMI 26.93 kg/m²          Physical " Exam  Vitals reviewed.   Cardiovascular:      Pulses:           Dorsalis pedis pulses are 2+ on the right side.        Posterior tibial pulses are 2+ on the right side.   Feet:      Comments: Right lateral fifth digit incision site has healed sutures removed.  No pain with palpation.  Patient able to wiggle her toes. muscle manual test intact to the toe.  Mild edema noted to the left fifth digit.

## 2024-03-26 ENCOUNTER — TELEPHONE (OUTPATIENT)
Dept: NEUROLOGY | Facility: CLINIC | Age: 49
End: 2024-03-26

## 2024-03-26 NOTE — TELEPHONE ENCOUNTER
Per enc 3/31/23-  Vyepti 300 mg PA will  3/31/24    Called [email or web address] at [phone], spoke to Ct. She will fax the PA form to [phone].    Awaiting fax

## 2024-03-28 ENCOUNTER — HOSPITAL ENCOUNTER (OUTPATIENT)
Dept: INFUSION CENTER | Facility: HOSPITAL | Age: 49
End: 2024-03-28
Payer: MEDICARE

## 2024-03-28 VITALS
TEMPERATURE: 98.4 F | HEART RATE: 58 BPM | DIASTOLIC BLOOD PRESSURE: 67 MMHG | RESPIRATION RATE: 18 BRPM | SYSTOLIC BLOOD PRESSURE: 144 MMHG

## 2024-03-28 DIAGNOSIS — D50.8 IRON DEFICIENCY ANEMIA SECONDARY TO INADEQUATE DIETARY IRON INTAKE: Primary | ICD-10-CM

## 2024-03-28 DIAGNOSIS — G43.109 MIGRAINE WITH AURA AND WITHOUT STATUS MIGRAINOSUS, NOT INTRACTABLE: ICD-10-CM

## 2024-03-28 PROCEDURE — 96365 THER/PROPH/DIAG IV INF INIT: CPT

## 2024-03-28 PROCEDURE — 96372 THER/PROPH/DIAG INJ SC/IM: CPT

## 2024-03-28 RX ORDER — SODIUM CHLORIDE 9 MG/ML
20 INJECTION, SOLUTION INTRAVENOUS ONCE
OUTPATIENT
Start: 2024-06-20

## 2024-03-28 RX ORDER — CYANOCOBALAMIN 1000 UG/ML
1000 INJECTION, SOLUTION INTRAMUSCULAR; SUBCUTANEOUS ONCE
Status: COMPLETED | OUTPATIENT
Start: 2024-03-28 | End: 2024-03-28

## 2024-03-28 RX ORDER — CYANOCOBALAMIN 1000 UG/ML
1000 INJECTION, SOLUTION INTRAMUSCULAR; SUBCUTANEOUS ONCE
OUTPATIENT
Start: 2024-06-20

## 2024-03-28 RX ORDER — SODIUM CHLORIDE 9 MG/ML
20 INJECTION, SOLUTION INTRAVENOUS ONCE
Status: COMPLETED | OUTPATIENT
Start: 2024-03-28 | End: 2024-03-28

## 2024-03-28 RX ADMIN — SODIUM CHLORIDE 20 ML/HR: 0.9 INJECTION, SOLUTION INTRAVENOUS at 15:12

## 2024-03-28 RX ADMIN — CYANOCOBALAMIN 1000 MCG: 1000 INJECTION, SOLUTION INTRAMUSCULAR at 16:30

## 2024-03-28 RX ADMIN — EPTINEZUMAB-JJMR 300 MG: 100 INJECTION INTRAVENOUS at 15:50

## 2024-03-28 NOTE — PROGRESS NOTES
Shweta Nolan  tolerated vyepti infusion and vit b12 injection well with no complications.      Shweta Nolan is aware of future appt on 6/20 at 3PM.    AVS declined by Shewta Nolan.

## 2024-03-29 NOTE — TELEPHONE ENCOUNTER
Pt completed Vyepti infusion on 3/28.  Next scheduled for 6/21    Will complete PA closer to next infusion

## 2024-04-10 ENCOUNTER — TELEPHONE (OUTPATIENT)
Dept: NEUROLOGY | Facility: CLINIC | Age: 49
End: 2024-04-10

## 2024-04-10 NOTE — TELEPHONE ENCOUNTER
Chris  4/8 12:43 PM     Shweta corona calling. If you could please give me a call back on [phone].   ____________  Last visit Nelida Santosdi, 3/31/2023    Reached vm; left message to please cb or leave my chart message with further details in order to better assist.

## 2024-04-12 ENCOUNTER — HOSPITAL ENCOUNTER (OUTPATIENT)
Dept: RADIOLOGY | Facility: IMAGING CENTER | Age: 49
End: 2024-04-12
Payer: MEDICARE

## 2024-04-12 VITALS — HEIGHT: 63 IN | WEIGHT: 140 LBS | BODY MASS INDEX: 24.8 KG/M2

## 2024-04-12 DIAGNOSIS — Z12.31 SCREENING MAMMOGRAM, ENCOUNTER FOR: ICD-10-CM

## 2024-04-12 PROCEDURE — 77063 BREAST TOMOSYNTHESIS BI: CPT

## 2024-04-12 PROCEDURE — 77067 SCR MAMMO BI INCL CAD: CPT

## 2024-04-17 ENCOUNTER — OFFICE VISIT (OUTPATIENT)
Dept: PODIATRY | Facility: CLINIC | Age: 49
End: 2024-04-17

## 2024-04-17 VITALS
BODY MASS INDEX: 24.8 KG/M2 | HEART RATE: 62 BPM | WEIGHT: 140 LBS | DIASTOLIC BLOOD PRESSURE: 61 MMHG | SYSTOLIC BLOOD PRESSURE: 130 MMHG | HEIGHT: 63 IN

## 2024-04-17 DIAGNOSIS — Z98.890 STATUS POST RIGHT FOOT SURGERY: Primary | ICD-10-CM

## 2024-04-17 DIAGNOSIS — R22.41 LOCALIZED SWELLING OF TOE OF RIGHT FOOT: ICD-10-CM

## 2024-04-17 PROCEDURE — 99024 POSTOP FOLLOW-UP VISIT: CPT | Performed by: STUDENT IN AN ORGANIZED HEALTH CARE EDUCATION/TRAINING PROGRAM

## 2024-04-17 NOTE — PROGRESS NOTES
Assessment/Plan:    No problem-specific Assessment & Plan notes found for this encounter.       Diagnoses and all orders for this visit:    Status post right foot surgery    Localized swelling of toe of right foot        Plan:     -  Patient was counseled and educated on the condition and the diagnosis.  The diagnosis, treatment options and prognosis were discussed in detail.   -Right foot incision site has healed.  Mild erythema and discomfort.  Recommended patient to continue massaging the incision site with over-the-counter lotion rest ice elevate as needed.  We discussed the incisional pain is likely from scarring and edema to his toe.  If no improvement within 6 to 8 weeks patient to return to my office.  -Addressed all questions and concerns  -Return as needed    Subjective:      Patient ID: Shweta Nolan is a 48 y.o. female.    Patient presents for post-op appointment S/p right 5th toe excision of exostosis date of surgery 2/29/2024.  Patient reports overall she is doing well.  She does have intermittent discomfort with pressure of wearing shoes at the end of her work shift to the toe along with swelling.  Patient reports she ices for 15 minutes and rest and after that the pain subsides and she is doing her regular chores.  She denies any other complaints.        The following portions of the patient's history were reviewed and updated as appropriate: She  has a past medical history of Abdominal pain, Acute cystitis with hematuria (01/03/2020), Brain condition, Chronic kidney disease, COVID-19 virus infection (11/10/2022), De Quervain's disease (tenosynovitis), Esophageal perforation, Gastric leak, GERD (gastroesophageal reflux disease), Headache, Idiopathic intracranial hypertension, Kidney stone, Moderate protein-calorie malnutrition (HCC) (02/21/2018), No blood products, Papilledema, both eyes, PONV (postoperative nausea and vomiting), Postgastrectomy malabsorption, Presence of lumboperitoneal shunt,  Rotator cuff tendinitis, and Visual field defect.  She   Patient Active Problem List    Diagnosis Date Noted    Current cannabis vaping on some days 02/29/2024    Family history of kidney cancer 12/26/2023    Family history of thyroid cancer 12/26/2023    B12 deficiency 07/25/2023    PONV (postoperative nausea and vomiting)     Obstruction of left ureteropelvic junction (UPJ) due to stone 05/03/2023    Chronic idiopathic constipation 09/23/2022    Macromastia 04/01/2022    S/P bilateral breast reduction 04/01/2022    Annual physical exam 12/17/2021    Iron deficiency anemia secondary to inadequate dietary iron intake 11/09/2021    Migraine with aura and without status migrainosus, not intractable 04/30/2021    Encounter for surgical aftercare following surgery of digestive system 09/24/2020    Papilledema 12/10/2019    Postgastrectomy malabsorption 09/20/2019    History of idiopathic intracranial hypertension 03/08/2019    Chronic tension-type headache, intractable 03/08/2019    PTSD (post-traumatic stress disorder) 01/10/2019    Renal calculi 10/04/2018    Refusal of influenza vaccine by provider 09/28/2018    Bariatric surgery status 06/22/2018    Choroidal nevus, right eye 02/03/2018    Refusal of blood product 01/31/2018    Murmur, cardiac 01/26/2018    S/P  shunt 01/24/2018    Gastroesophageal reflux disease 12/15/2017    Hypercholesteremia 08/11/2017    Pseudotumor cerebri 05/31/2017    Mixed incontinence 04/03/2017    Subacromial bursitis of right shoulder joint 09/16/2015    Family history of malignant neoplasm of gastrointestinal tract 07/09/2013     She  has a past surgical history that includes CSF shunt; Gastric bypass (12/19/2016); Hysterectomy (03/14/2013); pr crtj shunt nituhmppwp-jqqlllgvx-ibpkjye terminus (Right, 05/31/2017); ESOPHAGOSCOPY WITH STENT INSERTION (N/A, 01/24/2018); Eye surgery (Bilateral, 1980); pr rplcmt/revj csf shunt valve/cath shunt sys (Right, 01/25/2018); LAPAROTOMY (N/A,  01/25/2018); pr rmvl compl csf shunt system w/o rplcmt shunt (Right, 02/05/2018); Esophagogastroduodenoscopy (N/A, 02/23/2018); Esophagogastroduodenoscopy (N/A, 02/23/2018); Esophagogastroduodenoscopy (N/A, 03/28/2018); Esophagogastroduodenoscopy (N/A, 04/05/2018); Esophagogastroduodenoscopy (N/A, 04/10/2018); pr egd transoral biopsy single/multiple (N/A, 06/27/2018); IR lumbar puncture (08/29/2018); Lumbar peritoneal shunt (11/19/2015); Wrist surgery (Right); pr crtj shunt xoccydyatd-zsa-aoe-aur (Right, 12/18/2019); pr cysto bladder w/ureteral catheterization (N/A, 06/06/2020); Hiatal hernia repair; FL retrograde pyelogram (06/24/2020); pr cysto/uretero w/lithotripsy &indwell stent insrt (Bilateral, 06/24/2020); pr cysto/uretero w/lithotripsy &indwell stent insrt (Left, 08/21/2021); FL retrograde pyelogram (08/21/2021); Breast biopsy (Left, 1993); pr breast reduction (Bilateral, 03/25/2022); Reduction mammaplasty (Bilateral); pr cysto bladder w/ureteral catheterization (Bilateral, 5/4/2023); FL retrograde pyelogram (5/4/2023); pr cysto/uretero w/lithotripsy &indwell stent insrt (N/A, 5/18/2023); FL retrograde pyelogram (5/18/2023); Bone exostosis excision (Right, 11/16/2023); and Bone exostosis excision (Right, 2/29/2024).  Current Outpatient Medications   Medication Sig Dispense Refill    atorvastatin (LIPITOR) 10 mg tablet Take 1 tablet (10 mg total) by mouth daily at bedtime 90 tablet 1    butalbital-acetaminophen-caffeine (FIORICET,ESGIC) -40 mg per tablet take 1 tablet by mouth every 6 hours if needed for headache or migraines -ONLY 10 PER MONTH 10 tablet 3    dexlansoprazole (DEXILANT) 60 MG capsule Take 1 capsule (60 mg total) by mouth daily 90 capsule 3    Multiple Vitamin (MULTIVITAMIN) tablet Take 1 tablet by mouth daily Wears a patch      Nutritional Supplements (Menopause Formula) TABS Take by mouth 2 gummies daily      naloxone (NARCAN) 4 mg/0.1 mL nasal spray Administer 1 spray into a nostril.  "If no response after 2-3 minutes, give another dose in the other nostril using a new spray. (Patient not taking: Reported on 3/13/2024) 1 each 1     No current facility-administered medications for this visit.   .    Review of Systems   All other systems reviewed and are negative.        Objective:      /61   Pulse 62   Ht 5' 3\" (1.6 m)   Wt 63.5 kg (140 lb)   LMP  (LMP Unknown)   BMI 24.80 kg/m²          Physical Exam  Vitals reviewed.   Musculoskeletal:      Right foot: Swelling and tenderness present.      Comments: Mild tenderness to touch noted to the right fifth toe.  Fifth toe is edematous.  Incision site has healed.  Patient able to wiggle all her toes light touch sensation intact to the fifth toe.  Cap refill time within normal limits.           "

## 2024-05-07 ENCOUNTER — TELEPHONE (OUTPATIENT)
Dept: HEMATOLOGY ONCOLOGY | Facility: CLINIC | Age: 49
End: 2024-05-07

## 2024-05-07 NOTE — TELEPHONE ENCOUNTER
Left message for patient stating we had to reschedule her appointment with Dr. Mcfarlane from 6/18/24 to 7/23/24 at 12:40.

## 2024-05-16 ENCOUNTER — TELEPHONE (OUTPATIENT)
Dept: NEUROLOGY | Facility: CLINIC | Age: 49
End: 2024-05-16

## 2024-05-16 NOTE — TELEPHONE ENCOUNTER
authorization in work queue.     Patients next infusion is scheduled for 2024.   [facility]  [address] [place] [id number]  Phone: [phone]  Fax: [phone]  Tax ID: [id number]  NPI: [id number]    Form completed and emailed to Taylor for provider signature.

## 2024-06-21 ENCOUNTER — HOSPITAL ENCOUNTER (OUTPATIENT)
Dept: INFUSION CENTER | Facility: HOSPITAL | Age: 49
End: 2024-06-21

## 2024-06-25 ENCOUNTER — HOSPITAL ENCOUNTER (OUTPATIENT)
Dept: INFUSION CENTER | Facility: HOSPITAL | Age: 49
Discharge: HOME | End: 2024-06-25
Payer: MEDICARE

## 2024-06-25 VITALS
SYSTOLIC BLOOD PRESSURE: 141 MMHG | TEMPERATURE: 97.5 F | DIASTOLIC BLOOD PRESSURE: 78 MMHG | HEART RATE: 58 BPM | OXYGEN SATURATION: 98 % | RESPIRATION RATE: 17 BRPM

## 2024-06-25 DIAGNOSIS — G43.109 MIGRAINE WITH AURA AND WITHOUT STATUS MIGRAINOSUS, NOT INTRACTABLE: Primary | ICD-10-CM

## 2024-06-25 DIAGNOSIS — D50.8 IRON DEFICIENCY ANEMIA SECONDARY TO INADEQUATE DIETARY IRON INTAKE: Primary | ICD-10-CM

## 2024-06-25 DIAGNOSIS — D50.8 IRON DEFICIENCY ANEMIA SECONDARY TO INADEQUATE DIETARY IRON INTAKE: ICD-10-CM

## 2024-06-25 PROCEDURE — 96365 THER/PROPH/DIAG IV INF INIT: CPT

## 2024-06-25 PROCEDURE — 96372 THER/PROPH/DIAG INJ SC/IM: CPT

## 2024-06-25 RX ORDER — CYANOCOBALAMIN 1000 UG/ML
1000 INJECTION, SOLUTION INTRAMUSCULAR; SUBCUTANEOUS ONCE
OUTPATIENT
Start: 2024-06-26

## 2024-06-25 RX ORDER — CYANOCOBALAMIN 1000 UG/ML
1000 INJECTION, SOLUTION INTRAMUSCULAR; SUBCUTANEOUS ONCE
Status: COMPLETED | OUTPATIENT
Start: 2024-06-25 | End: 2024-06-25

## 2024-06-25 RX ORDER — CYANOCOBALAMIN 1000 UG/ML
1000 INJECTION, SOLUTION INTRAMUSCULAR; SUBCUTANEOUS ONCE
Status: CANCELLED | OUTPATIENT
Start: 2024-09-12

## 2024-06-25 RX ORDER — SODIUM CHLORIDE 9 MG/ML
20 INJECTION, SOLUTION INTRAVENOUS ONCE
OUTPATIENT
Start: 2024-09-12

## 2024-06-25 RX ORDER — SODIUM CHLORIDE 9 MG/ML
20 INJECTION, SOLUTION INTRAVENOUS ONCE
Status: COMPLETED | OUTPATIENT
Start: 2024-06-25 | End: 2024-06-25

## 2024-06-25 RX ADMIN — SODIUM CHLORIDE 20 ML/HR: 0.9 INJECTION, SOLUTION INTRAVENOUS at 14:19

## 2024-06-25 RX ADMIN — EPTINEZUMAB-JJMR 300 MG: 100 INJECTION INTRAVENOUS at 14:42

## 2024-06-25 RX ADMIN — CYANOCOBALAMIN 1000 MCG: 1000 INJECTION, SOLUTION INTRAMUSCULAR at 15:32

## 2024-06-25 NOTE — PROGRESS NOTES
Shweta Nolan  tolerated vypeti and vit b 12 injection well with no complications.      Shweta Nolan is aware of future appt on 9/17 2 pm    AVS printed and given to Shweta Nolan:  No (Declined by Shweta Nolan)

## 2024-07-06 DIAGNOSIS — G93.2 PSEUDOTUMOR CEREBRI: ICD-10-CM

## 2024-07-09 RX ORDER — METHAZOLAMIDE 25 MG/1
25 TABLET ORAL 3 TIMES DAILY
Qty: 90 TABLET | Refills: 2 | Status: SHIPPED | OUTPATIENT
Start: 2024-07-09

## 2024-07-09 NOTE — TELEPHONE ENCOUNTER
Medication: Methazolamide     Dose/Frequency: 25 mg tablet, Take 1 tablet by mouth 3 times a day     Quantity: 90 tablets     Pharmacy: [facility]     Office:   [] PCP/Provider -   [x] Speciality/Provider - NeurologyNelida    Does the patient have enough for 3 days?   [] Yes   [] No - Send as HP to POD    LOV: 3/31/23 Nelida Villanueva   NOV: None scheduled     Nelida: Medication pended, please sign if agreeable

## 2024-07-16 ENCOUNTER — APPOINTMENT (OUTPATIENT)
Dept: LAB | Facility: HOSPITAL | Age: 49
End: 2024-07-16
Payer: MEDICARE

## 2024-07-16 DIAGNOSIS — E53.8 FOLIC ACID DEFICIENCY: ICD-10-CM

## 2024-07-16 DIAGNOSIS — D50.8 IRON DEFICIENCY ANEMIA SECONDARY TO INADEQUATE DIETARY IRON INTAKE: ICD-10-CM

## 2024-07-16 DIAGNOSIS — E53.8 B12 DEFICIENCY: ICD-10-CM

## 2024-07-16 LAB
ALBUMIN SERPL BCG-MCNC: 4.5 G/DL (ref 3.5–5)
ALP SERPL-CCNC: 59 U/L (ref 34–104)
ALT SERPL W P-5'-P-CCNC: 24 U/L (ref 7–52)
ANION GAP SERPL CALCULATED.3IONS-SCNC: 8 MMOL/L (ref 4–13)
AST SERPL W P-5'-P-CCNC: 19 U/L (ref 13–39)
BASOPHILS # BLD AUTO: 0.05 THOUSANDS/ÂΜL (ref 0–0.1)
BASOPHILS NFR BLD AUTO: 1 % (ref 0–1)
BILIRUB SERPL-MCNC: 0.51 MG/DL (ref 0.2–1)
BUN SERPL-MCNC: 11 MG/DL (ref 5–25)
CALCIUM SERPL-MCNC: 9.8 MG/DL (ref 8.4–10.2)
CHLORIDE SERPL-SCNC: 103 MMOL/L (ref 96–108)
CO2 SERPL-SCNC: 29 MMOL/L (ref 21–32)
CREAT SERPL-MCNC: 0.62 MG/DL (ref 0.6–1.3)
CRP SERPL QL: 1.3 MG/L
EOSINOPHIL # BLD AUTO: 0.32 THOUSAND/ÂΜL (ref 0–0.61)
EOSINOPHIL NFR BLD AUTO: 6 % (ref 0–6)
ERYTHROCYTE [DISTWIDTH] IN BLOOD BY AUTOMATED COUNT: 12.7 % (ref 11.6–15.1)
ERYTHROCYTE [SEDIMENTATION RATE] IN BLOOD: 1 MM/HOUR (ref 0–19)
FERRITIN SERPL-MCNC: 117 NG/ML (ref 11–307)
FOLATE SERPL-MCNC: >22.3 NG/ML
GFR SERPL CREATININE-BSD FRML MDRD: 106 ML/MIN/1.73SQ M
GLUCOSE P FAST SERPL-MCNC: 84 MG/DL (ref 65–99)
HCT VFR BLD AUTO: 47.5 % (ref 34.8–46.1)
HGB BLD-MCNC: 16 G/DL (ref 11.5–15.4)
IMM GRANULOCYTES # BLD AUTO: 0.01 THOUSAND/UL (ref 0–0.2)
IMM GRANULOCYTES NFR BLD AUTO: 0 % (ref 0–2)
IRON SATN MFR SERPL: 45 % (ref 15–50)
IRON SERPL-MCNC: 134 UG/DL (ref 50–212)
LDH SERPL-CCNC: 130 U/L (ref 140–271)
LYMPHOCYTES # BLD AUTO: 1.24 THOUSANDS/ÂΜL (ref 0.6–4.47)
LYMPHOCYTES NFR BLD AUTO: 24 % (ref 14–44)
MAGNESIUM SERPL-MCNC: 1.9 MG/DL (ref 1.9–2.7)
MCH RBC QN AUTO: 29.9 PG (ref 26.8–34.3)
MCHC RBC AUTO-ENTMCNC: 33.7 G/DL (ref 31.4–37.4)
MCV RBC AUTO: 89 FL (ref 82–98)
MONOCYTES # BLD AUTO: 0.3 THOUSAND/ÂΜL (ref 0.17–1.22)
MONOCYTES NFR BLD AUTO: 6 % (ref 4–12)
NEUTROPHILS # BLD AUTO: 3.34 THOUSANDS/ÂΜL (ref 1.85–7.62)
NEUTS SEG NFR BLD AUTO: 63 % (ref 43–75)
NRBC BLD AUTO-RTO: 0 /100 WBCS
PLATELET # BLD AUTO: 183 THOUSANDS/UL (ref 149–390)
PMV BLD AUTO: 10.1 FL (ref 8.9–12.7)
POTASSIUM SERPL-SCNC: 4.1 MMOL/L (ref 3.5–5.3)
PROT SERPL-MCNC: 7 G/DL (ref 6.4–8.4)
RBC # BLD AUTO: 5.36 MILLION/UL (ref 3.81–5.12)
SODIUM SERPL-SCNC: 140 MMOL/L (ref 135–147)
TIBC SERPL-MCNC: 301 UG/DL (ref 250–450)
UIBC SERPL-MCNC: 167 UG/DL (ref 155–355)
VIT B12 SERPL-MCNC: 434 PG/ML (ref 180–914)
WBC # BLD AUTO: 5.26 THOUSAND/UL (ref 4.31–10.16)

## 2024-07-16 PROCEDURE — 83540 ASSAY OF IRON: CPT

## 2024-07-16 PROCEDURE — 86140 C-REACTIVE PROTEIN: CPT

## 2024-07-16 PROCEDURE — 83615 LACTATE (LD) (LDH) ENZYME: CPT

## 2024-07-16 PROCEDURE — 83735 ASSAY OF MAGNESIUM: CPT

## 2024-07-16 PROCEDURE — 83550 IRON BINDING TEST: CPT

## 2024-07-16 PROCEDURE — 82607 VITAMIN B-12: CPT

## 2024-07-16 PROCEDURE — 85025 COMPLETE CBC W/AUTO DIFF WBC: CPT

## 2024-07-16 PROCEDURE — 82728 ASSAY OF FERRITIN: CPT

## 2024-07-16 PROCEDURE — 80053 COMPREHEN METABOLIC PANEL: CPT

## 2024-07-16 PROCEDURE — 85652 RBC SED RATE AUTOMATED: CPT

## 2024-07-16 PROCEDURE — 82746 ASSAY OF FOLIC ACID SERUM: CPT

## 2024-07-16 PROCEDURE — 36415 COLL VENOUS BLD VENIPUNCTURE: CPT

## 2024-07-23 ENCOUNTER — APPOINTMENT (EMERGENCY)
Dept: RADIOLOGY | Facility: HOSPITAL | Age: 49
End: 2024-07-23
Payer: MEDICARE

## 2024-07-23 ENCOUNTER — HOSPITAL ENCOUNTER (EMERGENCY)
Facility: HOSPITAL | Age: 49
Discharge: HOME | End: 2024-07-23
Attending: EMERGENCY MEDICINE
Payer: MEDICARE

## 2024-07-23 ENCOUNTER — OFFICE VISIT (OUTPATIENT)
Dept: HEMATOLOGY ONCOLOGY | Facility: CLINIC | Age: 49
End: 2024-07-23
Payer: MEDICARE

## 2024-07-23 VITALS
OXYGEN SATURATION: 99 % | SYSTOLIC BLOOD PRESSURE: 128 MMHG | HEIGHT: 63 IN | BODY MASS INDEX: 26.4 KG/M2 | TEMPERATURE: 98.3 F | DIASTOLIC BLOOD PRESSURE: 76 MMHG | RESPIRATION RATE: 18 BRPM | HEART RATE: 56 BPM | WEIGHT: 149 LBS

## 2024-07-23 VITALS
TEMPERATURE: 97.3 F | DIASTOLIC BLOOD PRESSURE: 80 MMHG | OXYGEN SATURATION: 99 % | HEART RATE: 50 BPM | SYSTOLIC BLOOD PRESSURE: 139 MMHG | RESPIRATION RATE: 20 BRPM

## 2024-07-23 DIAGNOSIS — E53.8 B12 DEFICIENCY: ICD-10-CM

## 2024-07-23 DIAGNOSIS — R51.9 HEADACHE: ICD-10-CM

## 2024-07-23 DIAGNOSIS — D50.8 IRON DEFICIENCY ANEMIA SECONDARY TO INADEQUATE DIETARY IRON INTAKE: Primary | ICD-10-CM

## 2024-07-23 DIAGNOSIS — R42 DIZZINESS: Primary | ICD-10-CM

## 2024-07-23 DIAGNOSIS — E53.8 FOLIC ACID DEFICIENCY: ICD-10-CM

## 2024-07-23 LAB
ALBUMIN SERPL BCG-MCNC: 4.3 G/DL (ref 3.5–5)
ALP SERPL-CCNC: 57 U/L (ref 34–104)
ALT SERPL W P-5'-P-CCNC: 17 U/L (ref 7–52)
ANION GAP SERPL CALCULATED.3IONS-SCNC: 6 MMOL/L (ref 4–13)
AST SERPL W P-5'-P-CCNC: 23 U/L (ref 13–39)
ATRIAL RATE: 56 BPM
BASOPHILS # BLD AUTO: 0.06 THOUSANDS/ÂΜL (ref 0–0.1)
BASOPHILS NFR BLD AUTO: 1 % (ref 0–1)
BILIRUB SERPL-MCNC: 0.45 MG/DL (ref 0.2–1)
BUN SERPL-MCNC: 18 MG/DL (ref 5–25)
CALCIUM SERPL-MCNC: 9.3 MG/DL (ref 8.4–10.2)
CHLORIDE SERPL-SCNC: 103 MMOL/L (ref 96–108)
CO2 SERPL-SCNC: 30 MMOL/L (ref 21–32)
CREAT SERPL-MCNC: 0.6 MG/DL (ref 0.6–1.3)
EOSINOPHIL # BLD AUTO: 0.35 THOUSAND/ÂΜL (ref 0–0.61)
EOSINOPHIL NFR BLD AUTO: 5 % (ref 0–6)
ERYTHROCYTE [DISTWIDTH] IN BLOOD BY AUTOMATED COUNT: 12.8 % (ref 11.6–15.1)
GFR SERPL CREATININE-BSD FRML MDRD: 107 ML/MIN/1.73SQ M
GLUCOSE SERPL-MCNC: 83 MG/DL (ref 65–140)
GLUCOSE SERPL-MCNC: 96 MG/DL (ref 65–140)
HCT VFR BLD AUTO: 44.9 % (ref 34.8–46.1)
HGB BLD-MCNC: 15.3 G/DL (ref 11.5–15.4)
IMM GRANULOCYTES # BLD AUTO: 0.02 THOUSAND/UL (ref 0–0.2)
IMM GRANULOCYTES NFR BLD AUTO: 0 % (ref 0–2)
LYMPHOCYTES # BLD AUTO: 1.51 THOUSANDS/ÂΜL (ref 0.6–4.47)
LYMPHOCYTES NFR BLD AUTO: 22 % (ref 14–44)
MCH RBC QN AUTO: 30.1 PG (ref 26.8–34.3)
MCHC RBC AUTO-ENTMCNC: 34.1 G/DL (ref 31.4–37.4)
MCV RBC AUTO: 88 FL (ref 82–98)
MONOCYTES # BLD AUTO: 0.41 THOUSAND/ÂΜL (ref 0.17–1.22)
MONOCYTES NFR BLD AUTO: 6 % (ref 4–12)
NEUTROPHILS # BLD AUTO: 4.68 THOUSANDS/ÂΜL (ref 1.85–7.62)
NEUTS SEG NFR BLD AUTO: 66 % (ref 43–75)
NRBC BLD AUTO-RTO: 0 /100 WBCS
P AXIS: 40 DEGREES
PLATELET # BLD AUTO: 197 THOUSANDS/UL (ref 149–390)
PMV BLD AUTO: 9.8 FL (ref 8.9–12.7)
POTASSIUM SERPL-SCNC: 4.4 MMOL/L (ref 3.5–5.3)
PR INTERVAL: 148 MS
PROT SERPL-MCNC: 6.5 G/DL (ref 6.4–8.4)
QRS AXIS: 1 DEGREES
QRSD INTERVAL: 72 MS
QT INTERVAL: 402 MS
QTC INTERVAL: 387 MS
RBC # BLD AUTO: 5.08 MILLION/UL (ref 3.81–5.12)
SODIUM SERPL-SCNC: 139 MMOL/L (ref 135–147)
T WAVE AXIS: -12 DEGREES
VENTRICULAR RATE: 56 BPM
WBC # BLD AUTO: 7.03 THOUSAND/UL (ref 4.31–10.16)

## 2024-07-23 PROCEDURE — 71046 X-RAY EXAM CHEST 2 VIEWS: CPT

## 2024-07-23 PROCEDURE — 82948 REAGENT STRIP/BLOOD GLUCOSE: CPT

## 2024-07-23 PROCEDURE — 99214 OFFICE O/P EST MOD 30 MIN: CPT | Performed by: INTERNAL MEDICINE

## 2024-07-23 PROCEDURE — 99285 EMERGENCY DEPT VISIT HI MDM: CPT | Performed by: EMERGENCY MEDICINE

## 2024-07-23 PROCEDURE — 93010 ELECTROCARDIOGRAM REPORT: CPT | Performed by: INTERNAL MEDICINE

## 2024-07-23 PROCEDURE — 99285 EMERGENCY DEPT VISIT HI MDM: CPT

## 2024-07-23 PROCEDURE — 70250 X-RAY EXAM OF SKULL: CPT

## 2024-07-23 PROCEDURE — 36415 COLL VENOUS BLD VENIPUNCTURE: CPT

## 2024-07-23 PROCEDURE — 93005 ELECTROCARDIOGRAM TRACING: CPT

## 2024-07-23 PROCEDURE — 70450 CT HEAD/BRAIN W/O DYE: CPT

## 2024-07-23 PROCEDURE — 80053 COMPREHEN METABOLIC PANEL: CPT

## 2024-07-23 PROCEDURE — 85025 COMPLETE CBC W/AUTO DIFF WBC: CPT

## 2024-07-23 PROCEDURE — 74018 RADEX ABDOMEN 1 VIEW: CPT

## 2024-07-23 PROCEDURE — 96360 HYDRATION IV INFUSION INIT: CPT

## 2024-07-23 RX ORDER — ACETAMINOPHEN 325 MG/1
650 TABLET ORAL ONCE
Status: COMPLETED | OUTPATIENT
Start: 2024-07-23 | End: 2024-07-23

## 2024-07-23 RX ORDER — MECLIZINE HYDROCHLORIDE 25 MG/1
25 TABLET ORAL 3 TIMES DAILY PRN
Qty: 21 TABLET | Refills: 0 | Status: SHIPPED | OUTPATIENT
Start: 2024-07-23 | End: 2024-07-30

## 2024-07-23 RX ORDER — MECLIZINE HYDROCHLORIDE 25 MG/1
25 TABLET ORAL ONCE
Status: COMPLETED | OUTPATIENT
Start: 2024-07-23 | End: 2024-07-23

## 2024-07-23 RX ADMIN — MECLIZINE HYDROCHLORIDE 25 MG: 25 TABLET ORAL at 15:31

## 2024-07-23 RX ADMIN — ACETAMINOPHEN 650 MG: 325 TABLET, FILM COATED ORAL at 15:31

## 2024-07-23 RX ADMIN — SODIUM CHLORIDE 1000 ML: 0.9 INJECTION, SOLUTION INTRAVENOUS at 17:03

## 2024-07-23 NOTE — PROGRESS NOTES
Hematology/Oncology Outpatient Follow-up  Shweta Nolan 48 y.o. female 1975 [id number]    Date:  7/23/2024    Assessment and Plan:  1. Iron deficiency anemia secondary to inadequate dietary iron intake  The patient does not seem to be anemic or iron deficient.  Her hemoglobin level is actually in the high normal range.  Her carboxyhemoglobin level was slightly above normal in the past which may be the etiology.  We will recheck her iron panel prior to her next visit in 6 months from now since she has a history of bariatric surgery which can cause decreased iron absorption.      The patient had complained today about significant symptoms which warrants immediate investigation including increased pressure feeling in her head with imbalance.  She does have a history of  shunt placement in the past.  The patient was asked to go to the emergency room for further evaluation with imaging of the brain.  Neurological evaluation may be warranted.  - CBC and differential; Future  - Comprehensive metabolic panel; Future  - Vitamin B12; Future  - Magnesium; Future  - Ferritin; Future  - Iron Panel (Includes Ferritin, Iron Sat%, Iron, and TIBC); Future  - Transfer to other facility; Future    2. B12 deficiency  Recent vitamin B12 level is within normal range.    3. Folic acid deficiency  Folic acid was normal.      HPI:    Patient is a pleasant 48-year-old female with complicated past medical history who presented originally for further evaluation and treatment of her microcytic anemia/iron deficiency.  She had gastric sleeve surgery 2016 followed by laparoscopic paraesophageal hernia repair in 2018 which later resulted in GE junction leak/fistula requiring open repair and lead to sepsis/inpatient hospitalization for 5 months.  She also was diagnosed with pseudotumor cerebri in 2015 had  shunt placed which had to be removed with her sepsis and later had [facility] shunt placed.  She has  PTSD as a result of her above-mentioned  events, kidney stones and migraines.  She does have a neurologist and gastroenterologist who is monitoring her.      Her laboratory studies from 08/22/2021 showed microcytic hypochromic anemia H&H 8.6/30, MCV 77, MCH 22.1 her platelet count and white cells or normal.  BMP normal. She had significant iron deficiency iron saturation 5%, TIBC 414, serum iron 22 with ferritin 2. Her iron deficiency was corrected with IV iron Venofer x6 treatments November/December 2021        Interval history:  The patient came today for follow-up visit.  She stated that she is not feeling well for the last couple of days with increased pressure feeling in the head and imbalance.  Blood work on 7/16/2024 showed hemoglobin of 16.0 with hematocrit of 47.5%.  White cells and platelets are within normal range.  CMP was entirely normal.  Her iron panel vitamin B12 level were within normal range.  Inflammatory markers are normal.  Folic acid was normal.  ROS: Review of Systems   Constitutional:  Positive for fatigue. Negative for chills and fever.   HENT:  Positive for hearing loss. Negative for ear pain and sore throat.    Eyes:  Negative for pain and visual disturbance.   Respiratory:  Negative for cough and shortness of breath.    Cardiovascular:  Negative for chest pain and palpitations.   Gastrointestinal:  Negative for abdominal pain and vomiting.   Genitourinary:  Negative for dysuria and hematuria.   Musculoskeletal:  Negative for arthralgias and back pain.   Skin:  Negative for color change and rash.   Neurological:  Positive for dizziness and headaches. Negative for seizures and syncope.   Psychiatric/Behavioral:  Positive for sleep disturbance.    All other systems reviewed and are negative.      Past Medical History:   Diagnosis Date    Abdominal pain     Acute cystitis with hematuria 01/03/2020    Brain condition     Pseudotumor Cerebri     Chronic kidney disease     COVID-19 virus infection 11/10/2022    De Quervain's disease  (tenosynovitis)     Esophageal perforation     Gastric leak     GERD (gastroesophageal reflux disease)     Headache     Idiopathic intracranial hypertension     Kidney stone     Moderate protein-calorie malnutrition (HCC) 02/21/2018    No blood products     per pt: personal and Christian beliefs. surgeon office aware 12/13/19    Papilledema, both eyes     PONV (postoperative nausea and vomiting)     Postgastrectomy malabsorption     Presence of lumboperitoneal shunt     Resolved: Sep 20, 2017    Rotator cuff tendinitis     Resolved: Aug 23, 2017    Visual field defect        Past Surgical History:   Procedure Laterality Date    BONE EXOSTOSIS EXCISION Right 11/16/2023    Procedure: EXCISION EXOSTOSIS 5TH TOE RIght;  Surgeon: Sri Herring DPM;  [facility]: [facility];  Service: Podiatry    BONE EXOSTOSIS EXCISION Right 2/29/2024    Procedure: EXCISION EXOSTOSIS 5TH TOE;  Surgeon: Sri Herring DPM;  [facility]: [facility];  Service: Podiatry    BREAST BIOPSY Left 1993    benign    CSF SHUNT      LP shunt - 2015 -  shunt - 2017    ESOPHAGOGASTRODUODENOSCOPY N/A 02/23/2018    Procedure: ESOPHAGOGASTRODUODENOSCOPY (EGD) WITH ESOPHAGEAL STENT PLACEMENT;  Surgeon: Sergio Chung MD;  [facility];  Service: Thoracic    ESOPHAGOGASTRODUODENOSCOPY N/A 02/23/2018    Procedure: ESOPHAGOGASTRODUODENOSCOPY (EGD) WITH REMOVAL ESOPHAGEAL STENT  AND REPLACEMENT WITH 23mm X155mm WALLFLEX ESOPHAGEAL STENT;  Surgeon: Sergio Chung MD;  [facility];  Service: Thoracic    ESOPHAGOGASTRODUODENOSCOPY N/A 03/28/2018    Procedure: ESOPHAGOGASTRODUODENOSCOPY (EGD) with PEJ placement.;  Surgeon: Andrea Gonzales MD;  [facility];  Service: Gastroenterology    ESOPHAGOGASTRODUODENOSCOPY N/A 04/05/2018    Procedure: ESOPHAGOGASTRODUODENOSCOPY (EGD) with botox injection and kaofed placement;  Surgeon: Naomie Leroy DO;  [facility];  Service: Gastroenterology    ESOPHAGOGASTRODUODENOSCOPY N/A 04/10/2018     "Procedure: ESOPHAGOGASTRODUODENOSCOPY (EGD) with Kaofed placement;  Surgeon: Saroj Kirkland MD;  [facility];  Service: Gastroenterology    ESOPHAGOSCOPY WITH STENT INSERTION N/A 01/24/2018    Procedure: INSERTION STENT ESOPHAGEAL;  Surgeon: Gonsalo Yang MD;  [facility];  Service: Gastroenterology    EYE SURGERY Bilateral 1980    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)     FL RETROGRADE PYELOGRAM  06/24/2020    FL RETROGRADE PYELOGRAM  08/21/2021    FL RETROGRADE PYELOGRAM  5/4/2023    FL RETROGRADE PYELOGRAM  5/18/2023    GASTRIC BYPASS  12/19/2016    Lap sleeve gastrectomy w/shunt length shortening procedure    HIATAL HERNIA REPAIR      HYSTERECTOMY  03/14/2013    IR LUMBAR PUNCTURE  08/29/2018    LAPAROTOMY N/A 01/25/2018    Procedure: Exploratory Laparotomy, wash out,placement of drains, placement of NG feeding tube ;  Surgeon: Ruperto Jasso DO;  [facility];  Service: General    LUMBAR PERITONEAL SHUNT  11/19/2015    Laparoscopic assisted    MA BREAST REDUCTION Bilateral 03/25/2022    Procedure: BILATERAL BREAST REDUCTION;  Surgeon: Indio Williamson MD;  [facility];  Service: Plastics    MA CRTJ SHUNT [id number] Right 12/18/2019    Procedure: IMAGE GUIDED INSERTION OF RIGHT CORONAL VENTRICULAR-ATRIAL SHUNT;  Surgeon: Edouard Pires MD;  [facility];  Service: Neurosurgery    MA CRTJ SHUNT [id number] TERMINUS Right 05/31/2017    Procedure: IMAGE GUIDED CORONAL PLACEMENT OF PROGRAMABLE VENTRICULAR-PERITONEAL SHUNT, REMOVAL OF LP SHUNT ;  Surgeon: Edouard Pires MD;  [facility];  Service: Neurosurgery    MA CYSTO BLADDER W/URETERAL CATHETERIZATION N/A 06/06/2020    Procedure: Bilateral CYSTOSCOPY RETROGRADE PYELOGRAM WITH INSERTION STENT URETERAL;  Surgeon: Moe Mcgrath MD;  Location:  [facility] OR;  Service: Urology    MA CYSTO BLADDER W/URETERAL CATHETERIZATION Bilateral 5/4/2023    Procedure: CYSTOSCOPY RETROGRADE " PYELOGRAM WITH INSERTION STENT URETERAL;  Surgeon: Moe Mcgrath MD;  [facility];  Service: Urology    AZ CYSTO/URETERO W/LITHOTRIPSY &INDWELL STENT INSRT Bilateral 06/24/2020    Procedure: CYSTOSCOPY URETEROSCOPY WITH LITHOTRIPSY HOLMIUM LASER, RETROGRADE PYELOGRAM AND exchange bilateral  STENTs URETERAL;  Surgeon: Mark Godfrey MD;  [facility];  Service: Urology    AZ CYSTO/URETERO W/LITHOTRIPSY &INDWELL STENT INSRT Left 08/21/2021    Procedure: CYSTOSCOPY; LEFT URETEROSCOPY WITH RETROGRADE PYELOGRAM, REMOVAL OF STONE AND INSERTION LEFT URETERAL STENT;  Surgeon: Jamie Nuñez MD;  [facility];  Service: Urology    AZ CYSTO/URETERO W/LITHOTRIPSY &INDWELL STENT INSRT N/A 5/18/2023    Procedure: CYSTOSCOPY URETEROSCOPY WITH LITHOTRIPSY HOLMIUM LASER, RETROGRADE PYELOGRAM, REMOVAL OF BILATERAL STENTS,  AND INSERTION STENT URETERAL LEFT ,BASKET EXTRACTION OF STONE LEFT SIDE;  Surgeon: Jamie Nuñez MD;  [facility];  Service: Urology    AZ EGD TRANSORAL BIOPSY SINGLE/MULTIPLE N/A 06/27/2018    Procedure: ESOPHAGOGASTRODUODENOSCOPY (EGD) with padlock clip placement;  Surgeon: Lisbet Gonzalez MD;  [facility];  Service: Bariatrics    AZ RMVL COMPL CSF SHUNT SYSTEM W/O RPLCMT SHUNT Right 02/05/2018    Procedure: Removal of  shunt;  Surgeon: Edouard Pires MD;  [facility];  Service: Neurosurgery    AZ RPLCMT/REVJ CSF SHUNT VALVE/CATH SHUNT SYS Right 01/25/2018    Procedure: Externalization of right-sided SHUNT VENTRICULAR-PERITONEAL in anterior chest wall ribs two and three level  ;  Surgeon: Miquel Duarte MD;  [facility];  Service: Neurosurgery    REDUCTION MAMMAPLASTY Bilateral     WRIST SURGERY Right     x3 2006, 2008       Social History     Socioeconomic History    Marital status: Single     Spouse name: None    Number of children: 2    Years of education: High School or GED     Highest education level: None   Occupational  History    Occupation: employed    Tobacco Use    Smoking status: Former     Current packs/day: 0.00     Average packs/day: 0.5 packs/day for 20.6 years (10.3 ttl pk-yrs)     Types: Cigarettes     Start date: 1992     Quit date: 2012     Years since quittin.9    Smokeless tobacco: Never   Vaping Use    Vaping status: Every Day    Substances: THC, CBD   Substance and Sexual Activity    Alcohol use: Never    Drug use: Not Currently     Frequency: 7.0 times per week     Types: Marijuana     Comment: medical marijuana, vaping & topical Last used 11/10/23    Sexual activity: Yes   Other Topics Concern    None   Social History Narrative    ** Merged History Encounter **          Social Determinants of Health     Financial Resource Strain: Not on file   Food Insecurity: No Food Insecurity (2023)    Hunger Vital Sign     Worried About Running Out of Food in the Last Year: Never true     Ran Out of Food in the Last Year: Never true   Transportation Needs: No Transportation Needs (2023)    PRAPARE - Transportation     Lack of Transportation (Medical): No     Lack of Transportation (Non-Medical): No   Physical Activity: Not on file   Stress: Not on file   Social Connections: Not on file   Intimate Partner Violence: Not on file   Housing Stability: Low Risk  (2023)    Housing Stability Vital Sign     Unable to Pay for Housing in the Last Year: No     Number of Places Lived in the Last Year: 1     Unstable Housing in the Last Year: No       Family History   Problem Relation Age of Onset    Kidney cancer Mother 66    No Known Problems Sister     No Known Problems Daughter     No Known Problems Maternal Grandmother     Colon cancer Maternal Grandfather 39    No Known Problems Paternal Grandmother     Cancer Paternal Grandfather     Thyroid cancer Brother 40    No Known Problems Maternal Aunt     No Known Problems Maternal Aunt     Lung cancer Maternal Uncle 61    No Known Problems Paternal Aunt     Cancer  "Family         Gastric    Leukemia Family     Colon cancer Family     Pancreatic cancer Family        Allergies   Allergen Reactions    Benadryl [Diphenhydramine] Anaphylaxis     Throat closing    Phenergan [Promethazine] Anaphylaxis    Nsaids Other (See Comments)     Avoids due to Hx Gastric Sleeve         Current Outpatient Medications:     atorvastatin (LIPITOR) 10 mg tablet, Take 1 tablet (10 mg total) by mouth daily at bedtime, Disp: 90 tablet, Rfl: 1    butalbital-acetaminophen-caffeine (FIORICET,ESGIC) -40 mg per tablet, take 1 tablet by mouth every 6 hours if needed for headache or migraines -ONLY 10 PER MONTH, Disp: 10 tablet, Rfl: 3    dexlansoprazole (DEXILANT) 60 MG capsule, Take 1 capsule (60 mg total) by mouth daily, Disp: 90 capsule, Rfl: 3    methazolamide (NEPTAZANE) 25 MG tablet, take 1 tablet by mouth three times a day, Disp: 90 tablet, Rfl: 2    Multiple Vitamin (MULTIVITAMIN) tablet, Take 1 tablet by mouth daily Wears a patch, Disp: , Rfl:     Nutritional Supplements (Menopause Formula) TABS, Take by mouth 2 gummies daily, Disp: , Rfl:     naloxone (NARCAN) 4 mg/0.1 mL nasal spray, Administer 1 spray into a nostril. If no response after 2-3 minutes, give another dose in the other nostril using a new spray. (Patient not taking: Reported on 3/13/2024), Disp: 1 each, Rfl: 1      Physical Exam:  /76 (BP Location: Right arm, Patient Position: Sitting, Cuff Size: Adult)   Pulse 56   Temp 98.3 °F (36.8 °C)   Resp 18   Ht 5' 3\" (1.6 m)   Wt 67.6 kg (149 lb)   LMP  (LMP Unknown)   SpO2 99%   BMI 26.39 kg/m²     Physical Exam  Constitutional:       General: She is not in acute distress.     Appearance: She is well-developed. She is not diaphoretic.   HENT:      Head: Normocephalic and atraumatic.      Nose: Nose normal.   Eyes:      General: No scleral icterus.        Right eye: No discharge.         Left eye: No discharge.      Conjunctiva/sclera: Conjunctivae normal.      Pupils: " Pupils are equal, round, and reactive to light.   Neck:      Thyroid: No thyromegaly.      Vascular: No JVD.      Trachea: No tracheal deviation.   Cardiovascular:      Rate and Rhythm: Normal rate and regular rhythm.      Heart sounds: Normal heart sounds. No murmur heard.     No friction rub.   Pulmonary:      Effort: Pulmonary effort is normal. No respiratory distress.      Breath sounds: Normal breath sounds. No stridor. No wheezing or rales.   Chest:      Chest wall: No tenderness.   Abdominal:      General: There is no distension.      Palpations: Abdomen is soft. There is no hepatomegaly or splenomegaly.      Tenderness: There is no abdominal tenderness. There is no guarding or rebound.   Musculoskeletal:         General: No tenderness or deformity. Normal range of motion.      Cervical back: Normal range of motion and neck supple.   Lymphadenopathy:      Cervical: No cervical adenopathy.   Skin:     General: Skin is warm and dry.      Coloration: Skin is not pale.      Findings: No erythema or rash.   Neurological:      Mental Status: She is alert and oriented to person, place, and time.      Cranial Nerves: No cranial nerve deficit.      Coordination: Coordination normal.      Deep Tendon Reflexes: Reflexes are normal and symmetric.   Psychiatric:         Behavior: Behavior normal.         Thought Content: Thought content normal.         Judgment: Judgment normal.           Labs:  Lab Results   Component Value Date    WBC 5.26 07/16/2024    HGB 16.0 (H) 07/16/2024    HCT 47.5 (H) 07/16/2024    MCV 89 07/16/2024     07/16/2024     Lab Results   Component Value Date     03/22/2018    K 4.1 07/16/2024     07/16/2024    CO2 29 07/16/2024    ANIONGAP 8.5 03/22/2018    BUN 11 07/16/2024    CREATININE 0.62 07/16/2024    GLUCOSE 94 01/25/2018    GLUF 84 07/16/2024    CALCIUM 9.8 07/16/2024    CORRECTEDCA 9.5 05/16/2023    AST 19 07/16/2024    ALT 24 07/16/2024    ALKPHOS 59 07/16/2024    EGFR 106  07/16/2024       Patient voiced understanding and agreement in the above discussion. Aware to contact our office with questions/symptoms in the interim.

## 2024-07-23 NOTE — ED PROVIDER NOTES
"History  Chief Complaint   Patient presents with    Dizziness     Patient was seen at doctor's office. Patient was sent here for CT scan of her shunt. Patient has been feeling dizzy for two weeks.       Dizziness  Associated symptoms: headaches      Patient is a 48-year-old female with a past medical history of pseudotumor cerebri with shunt placed about 5 years ago and CKD presenting for 2 weeks of dizziness, head pressure, and just feeling \"off.\"  She states this suddenly came on when she was at work and cannot identify any associated triggers.  She has experienced this before but not like this.  She states that she feels like she is losing her balance when she closes her eyes and overall feels off balance when she walks.  She states her condition worsens when it is raining.  She denies fevers, chills, nausea, vomiting, weakness, altered mental status, melena, hematochezia, syncope, focal neurological deficit, chest pain, abdominal pain, and peripheral edema.    Prior to Admission Medications   Prescriptions Last Dose Informant Patient Reported? Taking?   Multiple Vitamin (MULTIVITAMIN) tablet  Self Yes No   Sig: Take 1 tablet by mouth daily Wears a patch   Nutritional Supplements (Menopause Formula) TABS  Self Yes No   Sig: Take by mouth 2 gummies daily   atorvastatin (LIPITOR) 10 mg tablet  Self No No   Sig: Take 1 tablet (10 mg total) by mouth daily at bedtime   butalbital-acetaminophen-caffeine (FIORICET,ESGIC) -40 mg per tablet  Self No No   Sig: take 1 tablet by mouth every 6 hours if needed for headache or migraines -ONLY 10 PER MONTH   dexlansoprazole (DEXILANT) 60 MG capsule  Self No No   Sig: Take 1 capsule (60 mg total) by mouth daily   methazolamide (NEPTAZANE) 25 MG tablet  Self No No   Sig: take 1 tablet by mouth three times a day   naloxone (NARCAN) 4 mg/0.1 mL nasal spray  Self No No   Sig: Administer 1 spray into a nostril. If no response after 2-3 minutes, give another dose in the other " nostril using a new spray.   Patient not taking: Reported on 3/13/2024      Facility-Administered Medications: None       Past Medical History:   Diagnosis Date    Abdominal pain     Acute cystitis with hematuria 01/03/2020    Brain condition     Pseudotumor Cerebri     Chronic kidney disease     COVID-19 virus infection 11/10/2022    De Quervain's disease (tenosynovitis)     Esophageal perforation     Gastric leak     GERD (gastroesophageal reflux disease)     Headache     Idiopathic intracranial hypertension     Kidney stone     Moderate protein-calorie malnutrition (HCC) 02/21/2018    No blood products     per pt: personal and Confucianist beliefs. surgeon office aware 12/13/19    Papilledema, both eyes     PONV (postoperative nausea and vomiting)     Postgastrectomy malabsorption     Presence of lumboperitoneal shunt     Resolved: Sep 20, 2017    Rotator cuff tendinitis     Resolved: Aug 23, 2017    Visual field defect        Past Surgical History:   Procedure Laterality Date    BONE EXOSTOSIS EXCISION Right 11/16/2023    Procedure: EXCISION EXOSTOSIS 5TH TOE RIght;  Surgeon: Sri Herring DPM;  Location: [facility];  Service: Podiatry    BONE EXOSTOSIS EXCISION Right 2/29/2024    Procedure: EXCISION EXOSTOSIS 5TH TOE;  Surgeon: Sri Herring DPM;  Location: [facility];  Service: Podiatry    BREAST BIOPSY Left 1993    benign    CSF SHUNT      LP shunt - 2015 -  shunt - 2017    ESOPHAGOGASTRODUODENOSCOPY N/A 02/23/2018    Procedure: ESOPHAGOGASTRODUODENOSCOPY (EGD) WITH ESOPHAGEAL STENT PLACEMENT;  Surgeon: Sergio Chung MD;  [facility];  Service: Thoracic    ESOPHAGOGASTRODUODENOSCOPY N/A 02/23/2018    Procedure: ESOPHAGOGASTRODUODENOSCOPY (EGD) WITH REMOVAL ESOPHAGEAL STENT  AND REPLACEMENT WITH 23mm X155mm WALLFLEX ESOPHAGEAL STENT;  Surgeon: Sergio Chung MD;  [facility];  Service: Thoracic    ESOPHAGOGASTRODUODENOSCOPY N/A 03/28/2018    Procedure: ESOPHAGOGASTRODUODENOSCOPY (EGD)  "with PEJ placement.;  Surgeon: Andrea Gonzales MD;  [facility]: [facility];  Service: Gastroenterology    ESOPHAGOGASTRODUODENOSCOPY N/A 04/05/2018    Procedure: ESOPHAGOGASTRODUODENOSCOPY (EGD) with botox injection and kaofed placement;  Surgeon: Naomie Leroy DO;  [facility];  Service: Gastroenterology    ESOPHAGOGASTRODUODENOSCOPY N/A 04/10/2018    Procedure: ESOPHAGOGASTRODUODENOSCOPY (EGD) with Kaofed placement;  Surgeon: Saroj Kirkland MD;  [facility];  Service: Gastroenterology    ESOPHAGOSCOPY WITH STENT INSERTION N/A 01/24/2018    Procedure: INSERTION STENT ESOPHAGEAL;  Surgeon: Gonsalo Yang MD;  [facility];  Service: Gastroenterology    EYE SURGERY Bilateral 1980    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)     FL RETROGRADE PYELOGRAM  06/24/2020    FL RETROGRADE PYELOGRAM  08/21/2021    FL RETROGRADE PYELOGRAM  5/4/2023    FL RETROGRADE PYELOGRAM  5/18/2023    GASTRIC BYPASS  12/19/2016    Lap sleeve gastrectomy w/shunt length shortening procedure    HIATAL HERNIA REPAIR      HYSTERECTOMY  03/14/2013    IR LUMBAR PUNCTURE  08/29/2018    LAPAROTOMY N/A 01/25/2018    Procedure: Exploratory Laparotomy, wash out,placement of drains, placement of NG feeding tube ;  Surgeon: Ruperto Jasso DO;  [facility];  Service: General    LUMBAR PERITONEAL SHUNT  11/19/2015    Laparoscopic assisted    GA BREAST REDUCTION Bilateral 03/25/2022    Procedure: BILATERAL BREAST REDUCTION;  Surgeon: Indio Williamson MD;  [facility];  Service: Plastics    GA CRTJ SHUNT [id number] Right 12/18/2019    Procedure: IMAGE GUIDED INSERTION OF RIGHT CORONAL VENTRICULAR-ATRIAL SHUNT;  Surgeon: Edouard Pires MD;  [facility];  Service: Neurosurgery    GA CRTJ SHUNT [id number] TERMINUS Right 05/31/2017    Procedure: IMAGE GUIDED CORONAL PLACEMENT OF PROGRAMABLE VENTRICULAR-PERITONEAL SHUNT, REMOVAL OF LP SHUNT ;  Surgeon: Edouard Pires MD;  " [facility];  Service: Neurosurgery    MT CYSTO BLADDER W/URETERAL CATHETERIZATION N/A 06/06/2020    Procedure: Bilateral CYSTOSCOPY RETROGRADE PYELOGRAM WITH INSERTION STENT URETERAL;  Surgeon: Moe Mcgrath MD;  Location: [facility];  Service: Urology    MT CYSTO BLADDER W/URETERAL CATHETERIZATION Bilateral 5/4/2023    Procedure: CYSTOSCOPY RETROGRADE PYELOGRAM WITH INSERTION STENT URETERAL;  Surgeon: Moe Mcgrath MD;  [facility];  Service: Urology    MT CYSTO/URETERO W/LITHOTRIPSY &INDWELL STENT INSRT Bilateral 06/24/2020    Procedure: CYSTOSCOPY URETEROSCOPY WITH LITHOTRIPSY HOLMIUM LASER, RETROGRADE PYELOGRAM AND exchange bilateral  STENTs URETERAL;  Surgeon: Mark Godfrey MD;  [facility];  Service: Urology    MT CYSTO/URETERO W/LITHOTRIPSY &INDWELL STENT INSRT Left 08/21/2021    Procedure: CYSTOSCOPY; LEFT URETEROSCOPY WITH RETROGRADE PYELOGRAM, REMOVAL OF STONE AND INSERTION LEFT URETERAL STENT;  Surgeon: Jamie Nuñez MD;  [facility];  Service: Urology    MT CYSTO/URETERO W/LITHOTRIPSY &INDWELL STENT INSRT N/A 5/18/2023    Procedure: CYSTOSCOPY URETEROSCOPY WITH LITHOTRIPSY HOLMIUM LASER, RETROGRADE PYELOGRAM, REMOVAL OF BILATERAL STENTS,  AND INSERTION STENT URETERAL LEFT ,BASKET EXTRACTION OF STONE LEFT SIDE;  Surgeon: Jamie Nuñez MD;  [facility];  Service: Urology    MT EGD TRANSORAL BIOPSY SINGLE/MULTIPLE N/A 06/27/2018    Procedure: ESOPHAGOGASTRODUODENOSCOPY (EGD) with padlock clip placement;  Surgeon: Lisbet Gonzalez MD;  [facility];  Service: Bariatrics    MT RMVL COMPL CSF SHUNT SYSTEM W/O RPLCMT SHUNT Right 02/05/2018    Procedure: Removal of  shunt;  Surgeon: Edouard Pires MD;  [facility];  Service: Neurosurgery    MT RPLCMT/REVJ CSF SHUNT VALVE/CATH SHUNT SYS Right 01/25/2018    Procedure: Externalization of right-sided SHUNT VENTRICULAR-PERITONEAL in anterior chest wall ribs two and three level  ;   Surgeon: Miquel Duarte MD;  [facility];  Service: Neurosurgery    REDUCTION MAMMAPLASTY Bilateral     WRIST SURGERY Right     x3 ,        Family History   Problem Relation Age of Onset    Kidney cancer Mother 66    No Known Problems Sister     No Known Problems Daughter     No Known Problems Maternal Grandmother     Colon cancer Maternal Grandfather 39    No Known Problems Paternal Grandmother     Cancer Paternal Grandfather     Thyroid cancer Brother 40    No Known Problems Maternal Aunt     No Known Problems Maternal Aunt     Lung cancer Maternal Uncle 61    No Known Problems Paternal Aunt     Cancer Family         Gastric    Leukemia Family     Colon cancer Family     Pancreatic cancer Family      I have reviewed and agree with the history as documented.    E-Cigarette/Vaping    E-Cigarette Use Current Every Day User     Comments medical marijuana-24  lotion      E-Cigarette/Vaping Substances    Nicotine No     THC Yes     CBD Yes     Flavoring No     Other Yes lotion    Unknown No      Social History     Tobacco Use    Smoking status: Former     Current packs/day: 0.00     Average packs/day: 0.5 packs/day for 20.6 years (10.3 ttl pk-yrs)     Types: Cigarettes     Start date: 1992     Quit date: 2012     Years since quittin.9    Smokeless tobacco: Never   Vaping Use    Vaping status: Every Day    Substances: THC, CBD   Substance Use Topics    Alcohol use: Never    Drug use: Not Currently     Frequency: 7.0 times per week     Types: Marijuana     Comment: medical marijuana, vaping & topical Last used 11/10/23        Review of Systems   Neurological:  Positive for dizziness and headaches.   All other systems reviewed and are negative.      Physical Exam  ED Triage Vitals [24 1439]   Temperature Pulse Respirations Blood Pressure SpO2   (!) 97.3 °F (36.3 °C) 71 20 123/73 99 %      Temp Source Heart Rate Source Patient Position - Orthostatic VS BP Location FiO2 (%)    Temporal Monitor Sitting Left arm --      Pain Score       6             Orthostatic Vital Signs  Vitals:    07/23/24 1439 07/23/24 1500 07/23/24 1715 07/23/24 1800   BP: 123/73 150/83 137/69 139/80   Pulse: 71 60 (!) 48 (!) 50   Patient Position - Orthostatic VS: Sitting Sitting Lying Lying       Physical Exam  Vitals and nursing note reviewed.   Constitutional:       General: She is not in acute distress.     Appearance: Normal appearance. She is not ill-appearing, toxic-appearing or diaphoretic.   HENT:      Head: Normocephalic and atraumatic.   Eyes:      General: No scleral icterus.        Right eye: No discharge.         Left eye: No discharge.      Extraocular Movements: Extraocular movements intact.      Conjunctiva/sclera: Conjunctivae normal.      Pupils: Pupils are equal, round, and reactive to light.   Cardiovascular:      Rate and Rhythm: Normal rate and regular rhythm.      Pulses: Normal pulses.      Heart sounds: Normal heart sounds. No murmur heard.     No friction rub. No gallop.   Pulmonary:      Effort: Pulmonary effort is normal. No respiratory distress.      Breath sounds: Normal breath sounds. No stridor. No wheezing, rhonchi or rales.   Abdominal:      General: Bowel sounds are normal. There is no distension.      Palpations: Abdomen is soft.      Tenderness: There is no abdominal tenderness. There is no right CVA tenderness, left CVA tenderness, guarding or rebound.   Musculoskeletal:         General: No swelling. Normal range of motion.      Cervical back: Normal range of motion. No rigidity.      Right lower leg: No edema.      Left lower leg: No edema.   Skin:     General: Skin is warm and dry.      Coloration: Skin is not jaundiced.   Neurological:      General: No focal deficit present.      Mental Status: She is alert and oriented to person, place, and time.      Cranial Nerves: Cranial nerves 2-12 are intact. No cranial nerve deficit.      Sensory: Sensation is intact. No sensory  deficit.      Motor: Motor function is intact. No weakness.      Coordination: Coordination is intact.      Gait: Gait is intact. Gait normal.      Comments: Hints exam normal.  Patient reports some dizziness with ambulation.   Psychiatric:         Mood and Affect: Mood normal.         Behavior: Behavior normal.         Thought Content: Thought content normal.         Judgment: Judgment normal.         ED Medications  Medications   meclizine (ANTIVERT) tablet 25 mg (25 mg Oral Given 7/23/24 1531)   acetaminophen (TYLENOL) tablet 650 mg (650 mg Oral Given 7/23/24 1531)   sodium chloride 0.9 % bolus 1,000 mL (0 mL Intravenous Stopped 7/23/24 1803)       Diagnostic Studies  Results Reviewed       Procedure Component Value Units Date/Time    Comprehensive metabolic panel [[id number]] Collected: 07/23/24 1700    Lab Status: Final result Specimen: Blood from Arm, Left Updated: 07/23/24 1740     Sodium 139 mmol/L      Potassium 4.4 mmol/L      Chloride 103 mmol/L      CO2 30 mmol/L      ANION GAP 6 mmol/L      BUN 18 mg/dL      Creatinine 0.60 mg/dL      Glucose 83 mg/dL      Calcium 9.3 mg/dL      AST 23 U/L      ALT 17 U/L      Alkaline Phosphatase 57 U/L      Total Protein 6.5 g/dL      Albumin 4.3 g/dL      Total Bilirubin 0.45 mg/dL      eGFR 107 ml/min/1.73sq m     Narrative:      National Kid[employer] guidelines for Chronic Kidney Disease (CKD):     Stage 1 with normal or high GFR (GFR > 90 mL/min/1.73 square meters)    Stage 2 Mild CKD (GFR = 60-89 mL/min/1.73 square meters)    Stage 3A Moderate CKD (GFR = 45-59 mL/min/1.73 square meters)    Stage 3B Moderate CKD (GFR = 30-44 mL/min/1.73 square meters)    Stage 4 Severe CKD (GFR = 15-29 mL/min/1.73 square meters)    Stage 5 End Stage CKD (GFR <15 mL/min/1.73 square meters)  Note: GFR calculation is accurate only with a steady state creatinine    CBC and differential [[id number]] Collected: 07/23/24 1700    Lab Status: Final result Specimen: Blood from  Arm, Left Updated: 07/23/24 1714     WBC 7.03 Thousand/uL      RBC 5.08 Million/uL      Hemoglobin 15.3 g/dL      Hematocrit 44.9 %      MCV 88 fL      MCH 30.1 pg      MCHC 34.1 g/dL      RDW 12.8 %      MPV 9.8 fL      Platelets 197 Thousands/uL      nRBC 0 /100 WBCs      Segmented % 66 %      Immature Grans % 0 %      Lymphocytes % 22 %      Monocytes % 6 %      Eosinophils Relative 5 %      Basophils Relative 1 %      Absolute Neutrophils 4.68 Thousands/µL      Absolute Immature Grans 0.02 Thousand/uL      Absolute Lymphocytes 1.51 Thousands/µL      Absolute Monocytes 0.41 Thousand/µL      Eosinophils Absolute 0.35 Thousand/µL      Basophils Absolute 0.06 Thousands/µL     Fingerstick Glucose (POCT) [[id number]]  (Normal) Collected: 07/23/24 1446    Lab Status: Final result Specimen: Blood Updated: 07/23/24 1446     POC Glucose 96 mg/dl                    CT head without contrast   Final Result by Darwin Duff MD (07/23 1557)      No acute intracranial abnormality.                  Workstation performed: [id number]         XR shunt series   Final Result by Memo Meneses MD (07/23 1627)      Intact  shunt catheter.                  Workstation performed: [id number]               Procedures  US Guided Peripheral IV    Date/Time: 7/23/2024 4:58 PM    Performed by: Norman Velasquez DO  Authorized by: Norman Velasquez DO    Patient location:  ED  Performed by:  Resident  Other Assisting Provider: Yes (comment) (Zion VOGEL)    Indications:     Indications: difficulty obtaining IV access      Image availability:  Images available in PACS  Procedure details:     Patient evaluated for contraindications to access (i.e. fistula, thrombosis, etc): Yes      Standard clean technique used for ultrasound access: Yes      Location:  Left arm    Catheter size:  20 gauge    Number of attempts:  2    Successful placement: yes    Post-procedure details:     Post-procedure:  Securement device placed    Assessment:  free fluid flow and no signs of infiltration      Post-procedure complications: none      Patient tolerance of procedure:  Tolerated with difficulty        ED Course  ED Course as of 07/24/24 1135   Tue Jul 23, 2024   1450 Fingerstick Glucose (POCT)  Within normal limits.   1528 ECG 12 lead  Slow rate, regular rhythm, normal axis.  P waves present.  Rate 56.  .  QT/QTc is 402/387.  EKG interpreted as sinus bradycardia.  No significant change found based on comparison with previous EKG from February 2024.   1605 CT head without contrast  IMPRESSION:     No acute intracranial abnormality.   1658 XR shunt series  IMPRESSION:     Intact  shunt catheter   1705 Patient reassessed and states that her condition is unchanged.  Will see how she feels after IV fluids.   1715 CBC and differential  CBC unremarkable, low suspicion for infection and anemia.   1748 Comprehensive metabolic panel  CMP unremarkable.   1754 Patient reassessed and is uncertain if she is feeling any better because she has not been walking around.  Will discharge her with meclizine and advised her to follow-up with her neurologist or PCP if condition does not improve.                                       Medical Decision Making  Amount and/or Complexity of Data Reviewed  Labs: ordered. Decision-making details documented in ED Course.  Radiology: ordered. Decision-making details documented in ED Course.  ECG/medicine tests:  Decision-making details documented in ED Course.    Risk  OTC drugs.    CBC and CMP ordered to check for leukocytosis, electrolyte abnormalities, kidney function, and liver function.  CT head and shunt series ordered in the setting of pseudotumor cerebri history with shunt placement in 2019.  Meclizine given for dizziness.  Tylenol given for pain control.  Fluids given for hydration in the setting of dizziness. Based on patient's labs and imaging studies, it is unlikely what the cause of her dizziness is; however, it is less  likely to be an acute brain abnormality or electrolyte disturbance given patient's testing. Patient discharged with meclizine and instructed to follow up with neurologist.    Differential diagnoses: Vertigo, electrolyte disturbance, dehydration, iron deficiency anemia, idiopathic dizziness, acute brain abnormality      Disposition  Final diagnoses:   Dizziness   Headache     Time reflects when diagnosis was documented in both MDM as applicable and the Disposition within this note       Time User Action Codes Description Comment    7/23/2024  5:55 PM Norman Velasquez [R42] Dizziness     7/23/2024  5:55 PM Norman Velasquez [R51.9] Headache           ED Disposition       ED Disposition   Discharge    Condition   Stable    Date/Time   Tue Jul 23, 2024  6:02 PM    Comment   Shweta Nolan discharge to home/self care.                   Follow-up Information    None         Discharge Medication List as of 7/23/2024  6:02 PM        START taking these medications    Details   meclizine (ANTIVERT) 25 mg tablet Take 1 tablet (25 mg total) by mouth 3 (three) times a day as needed for dizziness for up to 7 days, Starting Tue 7/23/2024, Until Tue 7/30/2024 at 2359, Normal           CONTINUE these medications which have NOT CHANGED    Details   atorvastatin (LIPITOR) 10 mg tablet Take 1 tablet (10 mg total) by mouth daily at bedtime, Starting Tue 2/6/2024, Normal      butalbital-acetaminophen-caffeine (FIORICET,ESGIC) -40 mg per tablet take 1 tablet by mouth every 6 hours if needed for headache or migraines -ONLY 10 PER MONTH, Normal      dexlansoprazole (DEXILANT) 60 MG capsule Take 1 capsule (60 mg total) by mouth daily, Starting Wed 11/15/2023, Normal      methazolamide (NEPTAZANE) 25 MG tablet take 1 tablet by mouth three times a day, Starting Tue 7/9/2024, Normal      Multiple Vitamin (MULTIVITAMIN) tablet Take 1 tablet by mouth daily Wears a patch, Historical Med      naloxone (NARCAN) 4 mg/0.1 mL nasal spray  Administer 1 spray into a nostril. If no response after 2-3 minutes, give another dose in the other nostril using a new spray., Normal      Nutritional Supplements (Menopause Formula) TABS Take by mouth 2 gummies daily, Historical Med           No discharge procedures on file.    PDMP Review         Value Time User    PDMP Reviewed  Yes 12/7/2023 12:18 PM Nelida Villanueva PA-C             ED Provider  Attending physically available and evaluated Shweta Nolan. I managed the patient along with the ED Attending.    Electronically Signed by           Norman Velasquez DO  07/24/24 4836

## 2024-07-23 NOTE — ED ATTENDING ATTESTATION
I saw and evaluated the patient. I have discussed the patient with the resident physician and agree with the resident's findings, assessment and plan as documented in the resident physician's note, unless otherwise documented below. All available laboratory and imaging studies were reviewed by myself.  I was present for key portions of any procedure(s) performed by the resident and I was immediately available to provide assistance.     I agree with the current assessment done in the Emergency Department. I have conducted an independent evaluation of this patient    Final Diagnosis:  1. Dizziness    2. Headache             Chief Complaint   Patient presents with    Dizziness     Patient was seen at doctor's office. Patient was sent here for CT scan of her shunt. Patient has been feeling dizzy for two weeks.     This is a 48 y.o. female with a history of IIH with [facility] shunt presenting for evaluation of dizziness. Patient says she has felt dizzy x2 weeks. She says dizziness is difficult to describe but denies room-spinning dizziness and says she just feels unsteady with standing. Associated with generalized headache. Denies fever, chills, cough, chest pain, SOB, n/v/d, abdominal pain, new vision changes, focal neuro symptoms, any other complaints.      PMH:   has a past medical history of Abdominal pain, Acute cystitis with hematuria (01/03/2020), Brain condition, Chronic kidney disease, COVID-19 virus infection (11/10/2022), De Quervain's disease (tenosynovitis), Esophageal perforation, Gastric leak, GERD (gastroesophageal reflux disease), Headache, Idiopathic intracranial hypertension, Kidney stone, Moderate protein-calorie malnutrition (HCC) (02/21/2018), No blood products, Papilledema, both eyes, PONV (postoperative nausea and vomiting), Postgastrectomy malabsorption, Presence of lumboperitoneal shunt, Rotator cuff tendinitis, and Visual field defect.    PSH:   has a past surgical history that includes CSF shunt;  Gastric bypass (2016); Hysterectomy (2013); pr crtj shunt cmnjzxwpey-gknvcyyju-djvxnvj terminus (Right, 2017); ESOPHAGOSCOPY WITH STENT INSERTION (N/A, 2018); Eye surgery (Bilateral, ); pr rplcmt/revj csf shunt valve/cath shunt sys (Right, 2018); LAPAROTOMY (N/A, 2018); pr rmvl compl csf shunt system w/o rplcmt shunt (Right, 2018); Esophagogastroduodenoscopy (N/A, 2018); Esophagogastroduodenoscopy (N/A, 2018); Esophagogastroduodenoscopy (N/A, 2018); Esophagogastroduodenoscopy (N/A, 2018); Esophagogastroduodenoscopy (N/A, 04/10/2018); pr egd transoral biopsy single/multiple (N/A, 2018); IR lumbar puncture (2018); Lumbar peritoneal shunt (2015); Wrist surgery (Right); pr crtj shunt ayazdyjgoi-xra-pxu-aur (Right, 2019); pr cysto bladder w/ureteral catheterization (N/A, 2020); Hiatal hernia repair; FL retrograde pyelogram (2020); pr cysto/uretero w/lithotripsy &indwell stent insrt (Bilateral, 2020); pr cysto/uretero w/lithotripsy &indwell stent insrt (Left, 2021); FL retrograde pyelogram (2021); Breast biopsy (Left, ); pr breast reduction (Bilateral, 2022); Reduction mammaplasty (Bilateral); pr cysto bladder w/ureteral catheterization (Bilateral, 2023); FL retrograde pyelogram (2023); pr cysto/uretero w/lithotripsy &indwell stent insrt (N/A, 2023); FL retrograde pyelogram (2023); Bone exostosis excision (Right, 2023); and Bone exostosis excision (Right, 2024).    Social:  Social History     Substance and Sexual Activity   Alcohol Use Never     Social History     Tobacco Use   Smoking Status Former    Current packs/day: 0.00    Average packs/day: 0.5 packs/day for 20.6 years (10.3 ttl pk-yrs)    Types: Cigarettes    Start date: 1992    Quit date: 2012    Years since quittin.9   Smokeless Tobacco Never     Social History     Substance and Sexual  Activity   Drug Use Not Currently    Frequency: 7.0 times per week    Types: Marijuana    Comment: medical marijuana, vaping & topical Last used 11/10/23     PE:  Vitals:    07/23/24 1439 07/23/24 1500 07/23/24 1715 07/23/24 1800   BP: 123/73 150/83 137/69 139/80   BP Location: Left arm Left arm Left arm Left arm   Pulse: 71 60 (!) 48 (!) 50   Resp: 20 20 20 20   Temp: (!) 97.3 °F (36.3 °C)      TempSrc: Temporal      SpO2: 99% 99% 96% 99%         Physical exam:  GENERAL APPEARANCE: Appears comfortable, no acute distress, calm and cooperative   NEURO: GCS 15, no focal deficits, cranial nerves grossly intact, clear fluent speech, no facial asymmetry. 5/5 strength in all four extremities. No pronator drift. Normal finger nose finger. Normal heel to shin. No dysdiadochokinesis. Visual fields intact.  Negative Romberg. Normal gait.    HEENT: No nystagmus. Normocephalic, atraumatic, moist mucous membranes   Neck: Supple, full ROM  CV: RRR, no murmurs, rubs, or gallops  LUNGS: CTAB, no wheezing, rales, or rhonchi  GI: Abdomen soft, non-tender, no rebound or guarding   MSK: Extremities non-tender, no pitting edema  SKIN: Warm and dry      Assessment and plan: This is a 48 y.o. female with a history of IIH with [facility] shunt presenting for evaluation of dizziness. Within ddx consider shunt occlusion/fracture, migraine, tension headache, orthostatic, vasovagal, cardiogenic, mass. Normal  neuro exam. No nystagmus or vertigo. Will obtain labs and imaging to evaluate.     Final assessment: Workup reassuring. Patient feels improved. Strict ED return precautions provided should symptoms worsen and patient can otherwise follow up outpatient. Patient expresses an understanding and agreement with the plan and remains in good condition for discharge.     Code Status: Prior  Advance Directive and Living Will:      Power of :    POLST:      Medications   meclizine (ANTIVERT) tablet 25 mg (25 mg Oral Given 7/23/24 9127)    acetaminophen (TYLENOL) tablet 650 mg (650 mg Oral Given 7/23/24 1531)   sodium chloride 0.9 % bolus 1,000 mL (0 mL Intravenous Stopped 7/23/24 1803)     CT head without contrast   Final Result      No acute intracranial abnormality.                  Workstation performed: [id number]         XR shunt series   Final Result      Intact  shunt catheter.                  Workstation performed: [id number]           Orders Placed This Encounter   Procedures    POC Cardiac US    CT head without contrast    XR shunt series    CBC and differential    Comprehensive metabolic panel    ECG 12 lead     Labs Reviewed   POCT GLUCOSE - Normal       Result Value Ref Range Status    POC Glucose 96  65 - 140 mg/dl Final   CBC AND DIFFERENTIAL    WBC 7.03  4.31 - 10.16 Thousand/uL Final    RBC 5.08  3.81 - 5.12 Million/uL Final    Hemoglobin 15.3  11.5 - 15.4 g/dL Final    Hematocrit 44.9  34.8 - 46.1 % Final    MCV 88  82 - 98 fL Final    MCH 30.1  26.8 - 34.3 pg Final    MCHC 34.1  31.4 - 37.4 g/dL Final    RDW 12.8  11.6 - 15.1 % Final    MPV 9.8  8.9 - 12.7 fL Final    Platelets 197  149 - 390 Thousands/uL Final    nRBC 0  /100 WBCs Final    Segmented % 66  43 - 75 % Final    Immature Grans % 0  0 - 2 % Final    Lymphocytes % 22  14 - 44 % Final    Monocytes % 6  4 - 12 % Final    Eosinophils Relative 5  0 - 6 % Final    Basophils Relative 1  0 - 1 % Final    Absolute Neutrophils 4.68  1.85 - 7.62 Thousands/µL Final    Absolute Immature Grans 0.02  0.00 - 0.20 Thousand/uL Final    Absolute Lymphocytes 1.51  0.60 - 4.47 Thousands/µL Final    Absolute Monocytes 0.41  0.17 - 1.22 Thousand/µL Final    Eosinophils Absolute 0.35  0.00 - 0.61 Thousand/µL Final    Basophils Absolute 0.06  0.00 - 0.10 Thousands/µL Final   COMPREHENSIVE METABOLIC PANEL    Sodium 139  135 - 147 mmol/L Final    Potassium 4.4  3.5 - 5.3 mmol/L Final    Comment: Slightly Hemolyzed:Results may be affected.    Chloride 103  96 - 108 mmol/L Final    CO2 30   21 - 32 mmol/L Final    ANION GAP 6  4 - 13 mmol/L Final    BUN 18  5 - 25 mg/dL Final    Creatinine 0.60  0.60 - 1.30 mg/dL Final    Comment: Standardized to IDMS reference method    Glucose 83  65 - 140 mg/dL Final    Comment: If the patient is fasting, the ADA then defines impaired fasting glucose as > 100 mg/dL and diabetes as > or equal to 123 mg/dL.    Calcium 9.3  8.4 - 10.2 mg/dL Final    AST 23  13 - 39 U/L Final    Comment: Slightly Hemolyzed:Results may be affected.    ALT 17  7 - 52 U/L Final    Comment: Specimen collection should occur prior to Sulfasalazine administration due to the potential for falsely depressed results.     Alkaline Phosphatase 57  34 - 104 U/L Final    Total Protein 6.5  6.4 - 8.4 g/dL Final    Albumin 4.3  3.5 - 5.0 g/dL Final    Total Bilirubin 0.45  0.20 - 1.00 mg/dL Final    Comment: Use of this assay is not recommended for patients undergoing treatment with eltrombopag due to the potential for falsely elevated results.  N-acetyl-p-benzoquinone imine (metabolite of Acetaminophen) will generate erroneously low results in samples for patients that have taken an overdose of Acetaminophen.    eGFR 107  ml/min/1.73sq m Final    Narrative:     National Kidney Disease Foundation guidelines for Chronic Kidney Disease (CKD):     Stage 1 with normal or high GFR (GFR > 90 mL/min/1.73 square meters)    Stage 2 Mild CKD (GFR = 60-89 mL/min/1.73 square meters)    Stage 3A Moderate CKD (GFR = 45-59 mL/min/1.73 square meters)    Stage 3B Moderate CKD (GFR = 30-44 mL/min/1.73 square meters)    Stage 4 Severe CKD (GFR = 15-29 mL/min/1.73 square meters)    Stage 5 End Stage CKD (GFR <15 mL/min/1.73 square meters)  Note: GFR calculation is accurate only with a steady state creatinine         Time reflects when diagnosis was documented in both MDM as applicable and the Disposition within this note       Time User Action Codes Description Comment    7/23/2024  5:55 PM Norman Velasquez Add [R42]  Dizziness     7/23/2024  5:55 PM Ron Norman Alisson [R51.9] Headache           ED Disposition       ED Disposition   Discharge    Condition   Stable    Date/Time   Tue Jul 23, 2024  6:02 PM    Comment   Shweta Nolan discharge to home/self care.                   Follow-up Information    None       Discharge Medication List as of 7/23/2024  6:02 PM        START taking these medications    Details   meclizine (ANTIVERT) 25 mg tablet Take 1 tablet (25 mg total) by mouth 3 (three) times a day as needed for dizziness for up to 7 days, Starting Tue 7/23/2024, Until Tue 7/30/2024 at 2359, Normal           CONTINUE these medications which have NOT CHANGED    Details   atorvastatin (LIPITOR) 10 mg tablet Take 1 tablet (10 mg total) by mouth daily at bedtime, Starting Tue 2/6/2024, Normal      butalbital-acetaminophen-caffeine (FIORICET,ESGIC) -40 mg per tablet take 1 tablet by mouth every 6 hours if needed for headache or migraines -ONLY 10 PER MONTH, Normal      dexlansoprazole (DEXILANT) 60 MG capsule Take 1 capsule (60 mg total) by mouth daily, Starting Wed 11/15/2023, Normal      methazolamide (NEPTAZANE) 25 MG tablet take 1 tablet by mouth three times a day, Starting Tue 7/9/2024, Normal      Multiple Vitamin (MULTIVITAMIN) tablet Take 1 tablet by mouth daily Wears a patch, Historical Med      naloxone (NARCAN) 4 mg/0.1 mL nasal spray Administer 1 spray into a nostril. If no response after 2-3 minutes, give another dose in the other nostril using a new spray., Normal      Nutritional Supplements (Menopause Formula) TABS Take by mouth 2 gummies daily, Historical Med           No discharge procedures on file.  Prior to Admission Medications   Prescriptions Last Dose Informant Patient Reported? Taking?   Multiple Vitamin (MULTIVITAMIN) tablet  Self Yes No   Sig: Take 1 tablet by mouth daily Wears a patch   Nutritional Supplements (Menopause Formula) TABS  Self Yes No   Sig: Take by mouth 2 gummies daily  "  atorvastatin (LIPITOR) 10 mg tablet  Self No No   Sig: Take 1 tablet (10 mg total) by mouth daily at bedtime   butalbital-acetaminophen-caffeine (FIORICET,ESGIC) -40 mg per tablet  Self No No   Sig: take 1 tablet by mouth every 6 hours if needed for headache or migraines -ONLY 10 PER MONTH   dexlansoprazole (DEXILANT) 60 MG capsule  Self No No   Sig: Take 1 capsule (60 mg total) by mouth daily   methazolamide (NEPTAZANE) 25 MG tablet  Self No No   Sig: take 1 tablet by mouth three times a day   naloxone (NARCAN) 4 mg/0.1 mL nasal spray  Self No No   Sig: Administer 1 spray into a nostril. If no response after 2-3 minutes, give another dose in the other nostril using a new spray.   Patient not taking: Reported on 3/13/2024      Facility-Administered Medications: None         Portions of the record may have been created with voice recognition software. Occasional wrong word or \"sound a like\" substitutions may have occurred due to the inherent limitations of voice recognition software. Read the chart carefully and recognize, using context, where substitutions have occurred.    Electronically signed by:  Lisa Conde    "

## 2024-07-23 NOTE — Clinical Note
Shweta Nolan was seen and treated in our emergency department on 7/23/2024.    No restrictions            Diagnosis:     Shweta  .    She may return on this date: 07/24/2024    Shweta Nolan was seen in the ED for dizziness evaluation.     If you have any questions or concerns, please don't hesitate to call.      Norman Velasquez, DO    ______________________________           _______________          _______________  Hospital Representative                              Date                                Time

## 2024-07-23 NOTE — DISCHARGE INSTRUCTIONS
You were seen in the emergency department today for evaluation of dizziness and headache.  Please return to the ED if you experience worsening dizziness, fatigue, passing out, start vomiting, or develop severe head pain.  Please follow-up with your neurologist.

## 2024-07-24 ENCOUNTER — VBI (OUTPATIENT)
Dept: INTERNAL MEDICINE CLINIC | Age: 49
End: 2024-07-24

## 2024-07-24 NOTE — TELEPHONE ENCOUNTER
07/24/24 10:47 AM    Patient contacted post ED visit, [facility] department spoke with patient/caregiver and outreach was successful.    Thank you.  Anitha Michelle  [facility]

## 2024-07-28 DIAGNOSIS — E78.00 HYPERCHOLESTEREMIA: ICD-10-CM

## 2024-07-28 RX ORDER — ATORVASTATIN CALCIUM 10 MG/1
10 TABLET, FILM COATED ORAL
Qty: 90 TABLET | Refills: 1 | Status: SHIPPED | OUTPATIENT
Start: 2024-07-28

## 2024-09-03 ENCOUNTER — OFFICE VISIT (OUTPATIENT)
Dept: INTERNAL MEDICINE CLINIC | Facility: OTHER | Age: 49
End: 2024-09-03
Payer: MEDICARE

## 2024-09-03 VITALS
DIASTOLIC BLOOD PRESSURE: 80 MMHG | OXYGEN SATURATION: 97 % | SYSTOLIC BLOOD PRESSURE: 126 MMHG | TEMPERATURE: 99.1 F | HEIGHT: 63 IN | BODY MASS INDEX: 26.75 KG/M2 | HEART RATE: 58 BPM | WEIGHT: 151 LBS

## 2024-09-03 DIAGNOSIS — Z12.11 ENCOUNTER FOR SCREENING FOR MALIGNANT NEOPLASM OF COLON: ICD-10-CM

## 2024-09-03 DIAGNOSIS — N95.1 HOT FLASHES DUE TO MENOPAUSE: Primary | ICD-10-CM

## 2024-09-03 PROCEDURE — 99213 OFFICE O/P EST LOW 20 MIN: CPT

## 2024-09-03 RX ORDER — VENLAFAXINE HYDROCHLORIDE 37.5 MG/1
37.5 CAPSULE, EXTENDED RELEASE ORAL
Qty: 30 CAPSULE | Refills: 1 | Status: SHIPPED | OUTPATIENT
Start: 2024-09-03

## 2024-09-03 NOTE — ASSESSMENT & PLAN NOTE
Symptoms appear consistent with hot flashes in relation to menopause  Discussed options for management including over-the-counter therapies which patient has tried and worked for several months, no longer working  Also discussed hormone replacement therapy and SSRIs versus MR SNRIs.  Patient declines hormone replacement therapy.  Patient also does not want to start Lexapro.  Patient interested in trying Effexor.  Will start on Effexor 37.5 mg daily.  Discussed side effects  Advised to monitor blood pressure daily, if noting consistent elevations over 130/80, advised to call the office  Follow-up 4 to 6 weeks or sooner symptoms worsen.  Patient advised to schedule follow-up with OB/GYN if symptoms not improving

## 2024-09-03 NOTE — PROGRESS NOTES
Ambulatory Visit  Name: Shweta Nolan      : 1975      MRN: [id number]  Encounter Provider: Kinjal Persaud PA-C  Encounter Date: 9/3/2024   Encounter department: [facility] [place]    Assessment & Plan   1. Hot flashes due to menopause  Assessment & Plan:  Symptoms appear consistent with hot flashes in relation to menopause  Discussed options for management including over-the-counter therapies which patient has tried and worked for several months, no longer working  Also discussed hormone replacement therapy and SSRIs versus MR SNRIs.  Patient declines hormone replacement therapy.  Patient also does not want to start Lexapro.  Patient interested in trying Effexor.  Will start on Effexor 37.5 mg daily.  Discussed side effects  Advised to monitor blood pressure daily, if noting consistent elevations over 130/80, advised to call the office  Follow-up 4 to 6 weeks or sooner symptoms worsen.  Patient advised to schedule follow-up with OB/GYN if symptoms not improving  Orders:  -     venlafaxine (EFFEXOR-XR) 37.5 mg 24 hr capsule; Take 1 capsule (37.5 mg total) by mouth daily with breakfast  2. Encounter for screening for malignant neoplasm of colon  -     Ambulatory Referral to Gastroenterology; Future       History of Present Illness     Patient is a 48-year-old female presenting to the office for concern of hot flashes x 6 months  She has been taking OTC menopause gummies to help which helped for several months, now minimal improvement  Hysterectomy in   Notes hot flashes during day and night   Has been sleeping poorly, waking up in sweat   Episodes approx 6-8 times per day, 4-5 times per night       Has been noting increased weight gain as well   Has been strict with diet and exercise, no changes to regiment   Has noted she started Dexillant due to hx of vomiting after eating following bariatric surgery, no longer has the vomiting                Review of Systems  "  Constitutional:  Positive for diaphoresis (hot flashes). Negative for chills, fatigue and fever.   HENT:  Negative for congestion, ear pain, postnasal drip, rhinorrhea, sinus pressure, sore throat and trouble swallowing.    Eyes:  Negative for pain and visual disturbance.   Respiratory:  Negative for cough, shortness of breath and wheezing.    Cardiovascular:  Negative for chest pain, palpitations and leg swelling.   Gastrointestinal:  Negative for abdominal pain, constipation, diarrhea, nausea and vomiting.   Genitourinary:  Negative for difficulty urinating, dysuria and hematuria.   Neurological:  Positive for headaches (chronic, unchanged). Negative for dizziness, weakness, light-headedness and numbness.   Psychiatric/Behavioral:  Negative for dysphoric mood and sleep disturbance. The patient is not nervous/anxious.    All other systems reviewed and are negative.    Medical History Reviewed by provider this encounter:  Tobacco  Allergies  Meds  Problems  Med Hx  Surg Hx  Fam Hx       Objective     /80 (BP Location: Left arm, Patient Position: Sitting, Cuff Size: Adult)   Pulse 58   Temp 99.1 °F (37.3 °C) (Temporal)   Ht 5' 3\" (1.6 m)   Wt 68.5 kg (151 lb)   LMP  (LMP Unknown)   SpO2 97% Comment: ra  BMI 26.75 kg/m²     Physical Exam  Vitals and nursing note reviewed.   Constitutional:       General: She is not in acute distress.     Appearance: Normal appearance. She is not ill-appearing.   HENT:      Head: Normocephalic and atraumatic.      Right Ear: Tympanic membrane, ear canal and external ear normal.      Left Ear: Tympanic membrane, ear canal and external ear normal.      Nose: Nose normal.      Mouth/Throat:      Mouth: Mucous membranes are moist.      Pharynx: Oropharynx is clear. No posterior oropharyngeal erythema.   Eyes:      General: No scleral icterus.     Conjunctiva/sclera: Conjunctivae normal.      Pupils: Pupils are equal, round, and reactive to light.   Cardiovascular:     "  Rate and Rhythm: Normal rate and regular rhythm.      Heart sounds: Normal heart sounds. No murmur heard.     No friction rub. No gallop.   Pulmonary:      Effort: Pulmonary effort is normal. No respiratory distress.      Breath sounds: Normal breath sounds. No wheezing, rhonchi or rales.   Chest:      Chest wall: No tenderness.   Musculoskeletal:      Cervical back: Normal range of motion. No tenderness.      Right lower leg: No edema.      Left lower leg: No edema.   Lymphadenopathy:      Cervical: No cervical adenopathy.   Skin:     General: Skin is warm and dry.      Coloration: Skin is not pale.      Findings: No erythema, lesion or rash.   Neurological:      General: No focal deficit present.      Mental Status: She is alert and oriented to person, place, and time. Mental status is at baseline.   Psychiatric:         Mood and Affect: Mood normal.         Behavior: Behavior normal.       Administrative Statements

## 2024-09-16 ENCOUNTER — TELEPHONE (OUTPATIENT)
Age: 49
End: 2024-09-16

## 2024-09-16 NOTE — TELEPHONE ENCOUNTER
Patient was prescribed following medication on her 9/3 visit    venlafaxine (EFFEXOR-XR) 37.5 mg 24 hr capsule     Patient is stating she can not handle this medication because she is feeling sick to her stomach basically she is not feeling good with this medication and is wondering if something else can be prescribed for hot flashes.    Please call her back and advise her accordingly.    Thank you!!

## 2024-09-17 ENCOUNTER — HOSPITAL ENCOUNTER (OUTPATIENT)
Dept: INFUSION CENTER | Facility: HOSPITAL | Age: 49
Discharge: HOME | End: 2024-09-17
Payer: MEDICARE

## 2024-09-17 VITALS
RESPIRATION RATE: 16 BRPM | OXYGEN SATURATION: 96 % | DIASTOLIC BLOOD PRESSURE: 81 MMHG | HEART RATE: 60 BPM | TEMPERATURE: 98.2 F | SYSTOLIC BLOOD PRESSURE: 119 MMHG

## 2024-09-17 DIAGNOSIS — N95.1 HOT FLASHES, MENOPAUSAL: Primary | ICD-10-CM

## 2024-09-17 DIAGNOSIS — D50.8 IRON DEFICIENCY ANEMIA SECONDARY TO INADEQUATE DIETARY IRON INTAKE: ICD-10-CM

## 2024-09-17 DIAGNOSIS — G43.109 MIGRAINE WITH AURA AND WITHOUT STATUS MIGRAINOSUS, NOT INTRACTABLE: Primary | ICD-10-CM

## 2024-09-17 PROCEDURE — 96365 THER/PROPH/DIAG IV INF INIT: CPT

## 2024-09-17 RX ORDER — SODIUM CHLORIDE 9 MG/ML
20 INJECTION, SOLUTION INTRAVENOUS ONCE
Status: COMPLETED | OUTPATIENT
Start: 2024-09-17 | End: 2024-09-17

## 2024-09-17 RX ORDER — SODIUM CHLORIDE 9 MG/ML
20 INJECTION, SOLUTION INTRAVENOUS ONCE
OUTPATIENT
Start: 2024-12-10

## 2024-09-17 RX ADMIN — EPTINEZUMAB-JJMR 300 MG: 100 INJECTION INTRAVENOUS at 14:37

## 2024-09-17 RX ADMIN — SODIUM CHLORIDE 20 ML/HR: 9 INJECTION, SOLUTION INTRAVENOUS at 14:37

## 2024-09-17 NOTE — PROGRESS NOTES
Shweta Nolan  tolerated treatment well with no complications.      Shweta Nolan is aware of future appt on 12/10 at 2:30 pm.     AVS declined by Shweta Nolan.

## 2024-09-17 NOTE — TELEPHONE ENCOUNTER
Notes she has not been taking Effexor due to side effects  Notes she has been having poor sleep due to hot flashes   Discussed options for Prozac, patient declines. Patient does not want to take any medication that will cause weight gain   Advised she reach out to GYN to discuss options. I will also sned message to PCP Dr. Boss

## 2024-09-17 NOTE — TELEPHONE ENCOUNTER
Spoke with Dr. Boss about Veozah which patient has requested.  Dr. Boss sent over 1 month supply, has follow-up with patient on 10/8/2024 to assess response.  Patient notified

## 2024-09-18 ENCOUNTER — TELEPHONE (OUTPATIENT)
Age: 49
End: 2024-09-18

## 2024-09-18 NOTE — TELEPHONE ENCOUNTER
Reason for call:   [] Refill   [x] Prior Auth  [] Other: Patient went to  her medication and was informed a PA was needed    Office:   [x] PCP/Provider - [facility] [place] - Randal Boss DO   [] Specialty/Provider -     Medication: fezolinetant (Veozah) tablet    Dose/Frequency: Take 1 tablet (45 mg total) by mouth daily     Quantity: 30 tablets    Pharmacy: [id number] - [address] [address]  Phone: [phone]  Fax: [phone]

## 2024-09-23 NOTE — TELEPHONE ENCOUNTER
PA for Veozah) 45 MG tablet SUBMITTED     via    [x]CMM-KEY: [id number]  [][employer]-Case ID #   []Faxed to plan   []Other website   []Phone call Case ID #     Office notes sent, clinical questions answered. Awaiting determination    Turnaround time for your insurance to make a decision on your Prior Authorization can take 7-21 business days.

## 2024-09-23 NOTE — TELEPHONE ENCOUNTER
PA for Fezolinetant (Veozah) tablet DENIED    Reason:(Screenshot if applicable)        Message sent to office clinical pool Yes    Denial letter scanned into Media No      Appeal started No (Provider will need to decide if appeal is warranted and send clinical documentation to Prior Authorization Team for initiation.)    **Please follow up with your patient regarding denial and next steps**

## 2024-09-26 ENCOUNTER — OFFICE VISIT (OUTPATIENT)
Dept: BARIATRICS | Facility: CLINIC | Age: 49
End: 2024-09-26
Payer: MEDICARE

## 2024-09-26 VITALS
WEIGHT: 151 LBS | TEMPERATURE: 97.5 F | HEIGHT: 63 IN | DIASTOLIC BLOOD PRESSURE: 70 MMHG | HEART RATE: 71 BPM | SYSTOLIC BLOOD PRESSURE: 110 MMHG | BODY MASS INDEX: 26.75 KG/M2

## 2024-09-26 DIAGNOSIS — Z48.815 ENCOUNTER FOR SURGICAL AFTERCARE FOLLOWING SURGERY OF DIGESTIVE SYSTEM: Primary | ICD-10-CM

## 2024-09-26 DIAGNOSIS — K91.2 POSTSURGICAL MALABSORPTION: ICD-10-CM

## 2024-09-26 DIAGNOSIS — K21.9 GERD (GASTROESOPHAGEAL REFLUX DISEASE): ICD-10-CM

## 2024-09-26 DIAGNOSIS — Z98.84 BARIATRIC SURGERY STATUS: ICD-10-CM

## 2024-09-26 PROCEDURE — 99214 OFFICE O/P EST MOD 30 MIN: CPT | Performed by: NURSE PRACTITIONER

## 2024-09-26 RX ORDER — DEXLANSOPRAZOLE 60 MG/1
60 CAPSULE, DELAYED RELEASE ORAL DAILY
Qty: 90 CAPSULE | Refills: 3 | Status: SHIPPED | OUTPATIENT
Start: 2024-09-26

## 2024-09-26 NOTE — PROGRESS NOTES
Date of surgery: 2016  Procedure: Sleeve  Performing surgeon: Dr Pope     Initial Weight - 213 lb  Current Weight - 151 lb  Keron Weight -   Total Body Weight Loss (EWL)- 18.1%  EWL% -  222%  TWB % -14%

## 2024-09-26 NOTE — PROGRESS NOTES
Assessment/Plan:     Patient ID: Shweta Nolan is a 49 y.o. female.     Bariatric Surgery Status/GERD/BMI 26  -s/p Vertical Sleeve Gastrectomy with Dr. 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 Had EGD with [facility] with Dr. Sha Gonzalez on 02/05/2020.      Presents to the office today for annual visit with concerns of weight gain. She reports since the last time she has been seen, her heartburn has been better controlled with dexilant however due to her appetite improving, she Is now gaining weight. She is interested in medications to help with weight loss. She is also following with an ob-gyn to with hormonal imbalances. Tolerating a regular diet. Denies having any abdominal pain, N/V/D/C, Regurgitation, reflux or dysphagia. Taking her multivitamins.       PLAN:     - does not qualify with any medications for weight loss at this time.   - continue with dexilant   - Routine follow up in 6 months for post op support.    - Continue with healthy lifestyle, adequate protein intake of 60 gm, fluid intake of at least 64 oz.   - Continue with MVI daily.   - Activity as tolerated.   - Labs ordered and will adjust accordingly if any deficiency.   - Follow up with RD and SW as needed.     Continued/Maintain healthy weight loss with good nutrition intakes.  Adequate hydration with at least 64oz. fluid intake.  Follow diet as discussed.  Follow vitamin and mineral recommendations as reviewed with you.  Exercise as tolerated.    Colonoscopy referral made: DUE - would like to defer this for now and would like to discuss it next year to have it done together with EGD.  MAMMOGRAM - utd   EGD screening - UTD - due in 2025    Follow-up in 6 months for post op visit. We kindly ask that your arrive 15 minutes before your scheduled appointment time with your  provider to allow our staff to room you, get your vital signs and update your chart.    Get lab work done prior to annual visit. Please call the office if you need a script.  It is recommended to check with your insurance BEFORE getting labs done to make sure they are covered by your policy.      Call our office if you have any problems with abdominal pain especially associated with fever, chills, nausea, vomiting or any other concerns.    All  Post-bariatric surgery patients should be aware that very small quantities of any alcohol can cause impairment and it is very possible not to feel the effect. The effect can be in the system for several hours.  It is also a stomach irritant.     It is advised to AVOID alcohol, Nonsteroidal antiinflammatory drugs (NSAIDS) and nicotine of all forms . Any of these can cause stomach irritation/pain.    Discussed the effects of alcohol on a bariatric patient and the increased impairment risk.     Keep up the good work!     Postsurgical Malabsorption   -At risk for malabsorption of vitamins/minerals secondary to malabsorption and restriction of intake from bariatric surgery  -Currently taking adequate postop bariatric surgery vitamin supplementation  -Next set of bariatric labs ordered for approximately 2 weeks  -Patient received education about the importance of adhering to a lifelong supplementation regimen to avoid vitamin/mineral deficiencies      Diagnoses and all orders for this visit:    Encounter for surgical aftercare following surgery of digestive system  -     Comprehensive metabolic panel; Future  -     PTH, intact; Future  -     Vitamin A; Future  -     Vitamin B1, whole blood; Future  -     Vitamin D 25 hydroxy; Future  -     Zinc; Future    Bariatric surgery status  -     dexlansoprazole (DEXILANT) 60 MG capsule; Take 1 capsule (60 mg total) by mouth daily  -     Comprehensive metabolic panel; Future  -     PTH, intact; Future  -     Vitamin A; Future  -     Vitamin  B1, whole blood; Future  -     Vitamin D 25 hydroxy; Future  -     Zinc; Future    GERD (gastroesophageal reflux disease)  -     dexlansoprazole (DEXILANT) 60 MG capsule; Take 1 capsule (60 mg total) by mouth daily  -     Comprehensive metabolic panel; Future  -     PTH, intact; Future  -     Vitamin A; Future  -     Vitamin B1, whole blood; Future  -     Vitamin D 25 hydroxy; Future  -     Zinc; Future    Postsurgical malabsorption  -     Comprehensive metabolic panel; Future  -     PTH, intact; Future  -     Vitamin A; Future  -     Vitamin B1, whole blood; Future  -     Vitamin D 25 hydroxy; Future  -     Zinc; Future    BMI 26.0-26.9,adult  -     Comprehensive metabolic panel; Future  -     PTH, intact; Future  -     Vitamin A; Future  -     Vitamin B1, whole blood; Future  -     Vitamin D 25 hydroxy; Future  -     Zinc; Future         Subjective:      Patient ID: Shweta Nolan is a 49 y.o. female.    -s/p Vertical Sleeve Gastrectomy with Dr. 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 Had EGD with [facility] with Dr. Sha Gonzalez on 02/05/2020.      Presents to the office today for annual visit with concerns of weight gain. She reports since the last time she has been seen, her heartburn has been better controlled with dexilant however due to her appetite improving, she Is now gaining weight. She is interested in medications to help with weight loss. She is also following with an ob-gyn to with hormonal imbalances. Tolerating a regular diet. Denies having any abdominal pain, N/V/D/C, Regurgitation, reflux or dysphagia. Taking her multivitamins.     Initial: 213 lbs (prior to sleeve); 137 lbs (prior to stretta)   Current: 137 lbs   EWL: (Weight loss is ahead of schedule at this post surgical period.)  Keron:  80-85 lbs   Current BMI is Body mass  "index is 26.75 kg/m².    Tolerating a regular diet-yes  Eating at least 60 grams of protein per day-yes  Following 30/60 minute rule with liquids-yes  Drinking at least 64 ounces of fluid per day-yes  Drinking carbonated beverages-yes  Sufficient exercise-yes - going to the gym and treadmill.   Using NSAIDs regularly-no  Using nicotine-no  Using alcohol-no  Supplements: Multivitamins and b12, iron chews, vitamin B12 shots, biotin. + vitamin D     EWL is 222%, which places the patient ahead of schedule for expected post surgical weight loss at this time.     The following portions of the patient's history were reviewed and updated as appropriate: allergies, current medications, past family history, past medical history, past social history, past surgical history and problem list.    Review of Systems   Constitutional:  Positive for appetite change.   Respiratory: Negative.     Cardiovascular: Negative.    Gastrointestinal: Negative.         Heartburn, vomiting - better with dexilant     Musculoskeletal: Negative.    Neurological: Negative.    Psychiatric/Behavioral: Negative.           Objective:    /70   Pulse 71   Temp 97.5 °F (36.4 °C) (Tympanic)   Ht 5' 3\" (1.6 m)   Wt 68.5 kg (151 lb)   LMP  (LMP Unknown)   BMI 26.75 kg/m²      Physical Exam  Vitals and nursing note reviewed.   Constitutional:       Appearance: Normal appearance.   Cardiovascular:      Rate and Rhythm: Normal rate and regular rhythm.      Pulses: Normal pulses.      Heart sounds: Normal heart sounds.   Pulmonary:      Effort: Pulmonary effort is normal.      Breath sounds: Normal breath sounds.   Abdominal:      General: Bowel sounds are normal.      Palpations: Abdomen is soft.      Tenderness: There is no abdominal tenderness.   Musculoskeletal:         General: Normal range of motion.   Skin:     General: Skin is warm and dry.   Neurological:      General: No focal deficit present.      Mental Status: She is alert and oriented to " person, place, and time.   Psychiatric:         Mood and Affect: Mood normal.         Behavior: Behavior normal.         Thought Content: Thought content normal.         Judgment: Judgment normal.

## 2024-10-02 ENCOUNTER — TELEPHONE (OUTPATIENT)
Dept: NEUROLOGY | Facility: CLINIC | Age: 49
End: 2024-10-02

## 2024-10-02 DIAGNOSIS — G93.2 PSEUDOTUMOR CEREBRI: ICD-10-CM

## 2024-10-02 RX ORDER — METHAZOLAMIDE 25 MG/1
25 TABLET ORAL 3 TIMES DAILY
Qty: 90 TABLET | Refills: 2 | Status: SHIPPED | OUTPATIENT
Start: 2024-10-02

## 2024-11-20 ENCOUNTER — OFFICE VISIT (OUTPATIENT)
Dept: NEUROLOGY | Facility: CLINIC | Age: 49
End: 2024-11-20
Payer: MEDICARE

## 2024-11-20 ENCOUNTER — TELEPHONE (OUTPATIENT)
Dept: NEUROLOGY | Facility: CLINIC | Age: 49
End: 2024-11-20

## 2024-11-20 VITALS
TEMPERATURE: 98.2 F | HEART RATE: 63 BPM | SYSTOLIC BLOOD PRESSURE: 114 MMHG | HEIGHT: 63 IN | DIASTOLIC BLOOD PRESSURE: 62 MMHG | WEIGHT: 155.1 LBS | BODY MASS INDEX: 27.48 KG/M2

## 2024-11-20 DIAGNOSIS — G43.109 MIGRAINE WITH AURA AND WITHOUT STATUS MIGRAINOSUS, NOT INTRACTABLE: Primary | ICD-10-CM

## 2024-11-20 DIAGNOSIS — R51.9 DAILY HEADACHE: ICD-10-CM

## 2024-11-20 DIAGNOSIS — R29.818 SUSPECTED SLEEP APNEA: ICD-10-CM

## 2024-11-20 PROCEDURE — 99215 OFFICE O/P EST HI 40 MIN: CPT | Performed by: PHYSICIAN ASSISTANT

## 2024-11-20 RX ORDER — RIMEGEPANT SULFATE 75 MG/75MG
75 TABLET, ORALLY DISINTEGRATING ORAL DAILY PRN
Qty: 16 TABLET | Refills: 11 | Status: SHIPPED | OUTPATIENT
Start: 2024-11-20

## 2024-11-20 RX ORDER — BUTALBITAL, ACETAMINOPHEN AND CAFFEINE 50; 325; 40 MG/1; MG/1; MG/1
1 TABLET ORAL EVERY 6 HOURS PRN
Qty: 15 TABLET | Refills: 4 | Status: SHIPPED | OUTPATIENT
Start: 2024-11-20

## 2024-11-20 NOTE — PROGRESS NOTES
"Neurology Ambulatory Visit  Name: Shweta Nolan       : 1975       MRN: [id number]   Encounter Provider: Nelida Villanueva PA-C   Encounter Date: 2024  Encounter department: NEUROLOGY [facility]    1. Migraine with aura and without status migrainosus, not intractable  Assessment & Plan:   Preventive:  Continue all your vitamins  Vyepti 300 mg every 3 months.     Abortive:   If needed use Fioricet but only 10 month  May use Nurtec 75 mg every other day for headaches \"as needed\"  For next 5 days take Decadron 2 mg in am      Orders:  -     rimegepant sulfate (Nurtec) 75 mg TBDP; Take 1 tablet (75 mg total) by mouth daily as needed (migraine)  -     butalbital-acetaminophen-caffeine (FIORICET,ESGIC) -40 mg per tablet; Take 1 tablet by mouth every 6 (six) hours as needed for headaches  2. Suspected sleep apnea  Assessment & Plan:  Referral to sleep medicine for BOTH a consult and a sleep study  [phone]  Dr García  Orders:  -     Ambulatory Referral to Sleep Medicine; Future  -     Ambulatory Referral to Sleep Medicine; Future  3. Daily headache  -     Ambulatory Referral to Sleep Medicine; Future  -     Ambulatory Referral to Sleep Medicine; Future      History of Present Illness     HPI   Shweta Nolan is a 49 y.o. Female who presents for pseudotumor cerebri s/p shunt    Past medical history:  Went to [place] for hiatal hernia and was in ICU for a long time.  Was septic after her surgeries and had her  shunt removed 2018 due to concerns of bacterial infection spreading through the shunt     Patient complains of pain in the right side of head like had been hit.  Also has \"zingers\" 1-2 a week and lasts a minute to 30 minutes.  Sometimes has to lay down for the rest of the day.  and hears a vibration noise for 2-3 week.  Sometimes is very loud and annoying.  This pain began in mid November.  Pain is the same as before.  Pain is similar to prior to shunt replacement.  Noise " is only new symptoms.      Idiopathic intracranial hypertension:  She is s/p lumboperitoneal shunt placement for increased intracranial pressure, performed at [facility] in 2015 and removal of the shunt and placement of a  shunt in May of 2017. She is also status post gastric bypass surgery and has lost 60lbs.      She saw her ophthalmologist 3/2020 and there was no optic nerve swelling.     Patient reports she has daily pressure and pain. Has good days and bad days but is always has pressure.  States that had eye exam approx 6 months ago and was fine.  Is scheduled to 4/14/2023 for another exam     What medications do you take or have you taken for your headaches?   Current Preventative:  Vitamin-C vitamin-D folic acid multivitamin vitamin B12  Methazolamide stopped 3 months ago  Vyepti 300mg     Current abortive:  Fioricet or medical marijuana    Prior PREVENTIVE:  nortriptyline (this helped her headaches),  Protriptyline  Gabapentin (off balance), Depakote, lamictal  Diamox (stopped by provider because of her history of kidney stones),   Verapamil (fatigue),  Emgality (3 doses, didn't notice a difference)     Prior ABORTIVE:   Fioricet (stopped on her own), Tramadol (stopped on her own),   Toradol (stopped on her own), Indomethacin     Non-Medical/Alternative Treatments used in the past for headaches: Has tried Massage (doesn’t help headaches), Chiropractic adjustment (doesn’t help with headaches), Acupuncture (doesn’t help with headaches)     What is your current pain level - 9/10,     How often do the headaches occur -   Baseline pain is : daily is 2-3/10  Increase pain: does have variable days of increasing pain.  Pain can increase for 30 minutes or last all day every couple of days  Can have 10/10 pain once a week (when weather is bad)  She is also getting the sensation of light headed and pain in the frontal region with bending over or getting up from sitting potision      What time of the day do the  "headaches start -   Baseline pain: there all the time  Increase pain: anytime of the day     How long do the headaches last -   Baseline pain is : there all the time  Increase pain: few minutes to rest of the day  She is also getting the sensation of light headed and pain in the frontal region with bending over or getting up from sitting poistion-this is also variable, usually happens 1-2 times a week.  Lasts only a minute or 2     Are you ever headache free - no always has pressure in her head     Where are they located -   Baseline pain: entire head  Increase pain: one temple to the other or frontal to the back of her head  Position change: frontal only     What is the intensity of pain -   Baseline pain: 4-5/10  Intense pain: 10/10  Position change pain: 9/10     Any warning prior to headache and how long do they last - N/A, they're constant     Describe your usual headache -   Baseline pain: Pressure, \"like my brain is going to explode out of my skull\"  Increase pain: zap, zinger and it can be sharp (pt has difficulty describing the sensation)  Position change: sharp pressure     Associated symptoms:   -       Problem with concentration  -       Blurred vision occasionally  -       Dizziness when extending or flexing neck to look up or down  -       prefer to be alone and in a dark room, unable to work     Headache are worse if the patient: cough, sneeze, bending over, moving bowel, running or exercising,   Headache trigger: N/A, they're constant     Have you used CBD or THC for your headaches and how often? Yes, has medical card  Are you current pregnant or planning on getting pregnant? No  Have you ever had any Brain imaging? yes  I personally reviewed these images.        MRAs of the head and neck without contrast done on 3/29/17 are both unremarkable.     Brain MRI without contrast on 3/27/17 shows ventricles in the lower limits of normal in size consistent with pseudotumor cerebri, as well as scattered " "parenchymal WM changes which are nonspecific.     Head CT 7/17: No acute intracranial abnormality.     LP @ [facility] April/ 2017: OP 20, CP14     Reviewed old notes from physician seen in the past- see above HPI for summary of previous encounters.     Review of Systems      Objective     /62 (BP Location: Left arm, Patient Position: Sitting, Cuff Size: Standard)   Pulse 63   Temp 98.2 °F (36.8 °C) (Temporal)   Ht 5' 3\" (1.6 m)   Wt 70.4 kg (155 lb 1.6 oz)   LMP  (LMP Unknown)   BMI 27.47 kg/m²    Physical Exam  Neurologic Exam  CONSTITUTIONAL: Well developed, well nourished, well groomed. No dysmorphic features.     Eyes:  EOM normal      Neck:  Normal ROM, neck supple.      HEENT:  Normocephalic atraumatic.    Chest:  Respirations regular and unlabored.    Psychiatric:  Normal behavior and appropriate affect      MENTAL STATUS  Orientation: Alert and oriented x 3  Fund of knowledge: Intact.    MOTOR (Upper and lower extremities)   Bulk/tone/abnormal movement: Normal muscle bulk and tone.      COORDINATION   Station/Gait: Normal baseline gait.    Administrative Statements     I have spent a total time of 44 minutes in caring for this patient on the day of the visit/encounter including Diagnostic results, Prognosis, Risks and benefits of tx options, Instructions for management, Patient and family education, Importance of tx compliance, Risk factor reductions, Impressions, Counseling / Coordination of care, Documenting in the medical record, Reviewing / ordering tests, medicine, procedures  , and Obtaining or reviewing history  .      "

## 2024-11-20 NOTE — ASSESSMENT & PLAN NOTE
" Preventive:  Continue all your vitamins  Vyepti 300 mg every 3 months.     Abortive:   If needed use Fioricet but only 10 month  May use Nurtec 75 mg every other day for headaches \"as needed\"  For next 5 days take Decadron 2 mg in am      "

## 2024-11-20 NOTE — TELEPHONE ENCOUNTER
PA started on CMM. (Key: [id number])     Nelida, are triptans contraindicated?    Awaiting clinical questions

## 2024-11-20 NOTE — PATIENT INSTRUCTIONS
"1. Suspected sleep apnea  Assessment & Plan:  Referral to sleep medicine for BOTH a consult and a sleep study  [phone]  Dr García  Orders:  -     Ambulatory Referral to Sleep Medicine; Future  -     Ambulatory Referral to Sleep Medicine; Future  2. Migraine with aura and without status migrainosus, not intractable  Assessment & Plan:   Preventive:  Continue all your vitamins  Vyepti 300 mg every 3 months.     Abortive:   If needed use Fioricet but only 10 month  May use Nurtec 75 mg every other day for headaches \"as needed\"  For next 5 days take Decadron 2 mg in am      Orders:  -     rimegepant sulfate (Nurtec) 75 mg TBDP; Take 1 tablet (75 mg total) by mouth daily as needed (migraine)  -     butalbital-acetaminophen-caffeine (FIORICET,ESGIC) -40 mg per tablet; Take 1 tablet by mouth every 6 (six) hours as needed for headaches  3. Daily headache  -     Ambulatory Referral to Sleep Medicine; Future  -     Ambulatory Referral to Sleep Medicine; Future      "

## 2024-11-25 ENCOUNTER — TELEPHONE (OUTPATIENT)
Dept: BARIATRICS | Facility: CLINIC | Age: 49
End: 2024-11-25

## 2024-11-25 NOTE — TELEPHONE ENCOUNTER
Nurtec approved from 11/21/24-11/21/25    Left message for pharm making them aware of approval    Mychart message sent to pt

## 2024-11-25 NOTE — TELEPHONE ENCOUNTER
PA for Dexilant SUBMITTED to [employer]    via    [x]CMM-KEY: [id number]  []Surescripts-Case ID #   []Availity-Auth ID #  NDC #   []Faxed to plan   []Other website   []Phone call Case ID #     []PA sent as URGENT    All office notes, labs and other pertaining documents and studies sent. Clinical questions answered. Awaiting determination from insurance company.     Turnaround time for your insurance to make a decision on your Prior Authorization can take 7-21 business days.

## 2024-11-26 NOTE — TELEPHONE ENCOUNTER
PA for Dexilant APPROVED     Date(s) approved 11/25/2024 - 11/25/2025    Case # [id number] ([facility], [facility])    Patient advised by          []Favoehart Message  []Phone call   [x]LMOM  []L/M to call office as no active Communication consent on file  []Unable to leave detailed message as VM not approved on Communication consent       Pharmacy advised by    [x]Fax  []Phone call    Approval letter scanned into [employer] Yes

## 2024-11-29 ENCOUNTER — TRANSCRIBE ORDERS (OUTPATIENT)
Dept: SLEEP CENTER | Facility: CLINIC | Age: 49
End: 2024-11-29

## 2024-11-29 DIAGNOSIS — R51.9 DAILY HEADACHE: ICD-10-CM

## 2024-11-29 DIAGNOSIS — R29.818 SUSPECTED SLEEP APNEA: Primary | ICD-10-CM

## 2024-12-10 ENCOUNTER — HOSPITAL ENCOUNTER (OUTPATIENT)
Dept: INFUSION CENTER | Facility: HOSPITAL | Age: 49
Discharge: HOME | End: 2024-12-10
Payer: MEDICARE

## 2024-12-10 VITALS
HEART RATE: 55 BPM | DIASTOLIC BLOOD PRESSURE: 56 MMHG | TEMPERATURE: 97.5 F | OXYGEN SATURATION: 97 % | SYSTOLIC BLOOD PRESSURE: 113 MMHG | RESPIRATION RATE: 16 BRPM

## 2024-12-10 DIAGNOSIS — D50.8 IRON DEFICIENCY ANEMIA SECONDARY TO INADEQUATE DIETARY IRON INTAKE: ICD-10-CM

## 2024-12-10 DIAGNOSIS — G43.109 MIGRAINE WITH AURA AND WITHOUT STATUS MIGRAINOSUS, NOT INTRACTABLE: Primary | ICD-10-CM

## 2024-12-10 PROCEDURE — 96365 THER/PROPH/DIAG IV INF INIT: CPT

## 2024-12-10 RX ORDER — SODIUM CHLORIDE 9 MG/ML
20 INJECTION, SOLUTION INTRAVENOUS ONCE
OUTPATIENT
Start: 2025-03-04

## 2024-12-10 RX ORDER — SODIUM CHLORIDE 9 MG/ML
20 INJECTION, SOLUTION INTRAVENOUS ONCE
Status: COMPLETED | OUTPATIENT
Start: 2024-12-10 | End: 2024-12-10

## 2024-12-10 RX ADMIN — SODIUM CHLORIDE 20 ML/HR: 0.9 INJECTION, SOLUTION INTRAVENOUS at 15:11

## 2024-12-10 RX ADMIN — EPTINEZUMAB-JJMR 300 MG: 100 INJECTION INTRAVENOUS at 15:23

## 2024-12-10 NOTE — PROGRESS NOTES
Shweta Nolan  tolerated vyepti treatment well with no complications.      Shweta Nolan is aware of future appt on 3/4/25    AVS printed and given to Shweta Nolan:  No (Declined by Shweta Nolan)

## 2024-12-13 ENCOUNTER — APPOINTMENT (EMERGENCY)
Dept: RADIOLOGY | Facility: HOSPITAL | Age: 49
End: 2024-12-13
Payer: MEDICARE

## 2024-12-13 ENCOUNTER — HOSPITAL ENCOUNTER (EMERGENCY)
Facility: HOSPITAL | Age: 49
Discharge: HOME | End: 2024-12-13
Attending: EMERGENCY MEDICINE
Payer: MEDICARE

## 2024-12-13 ENCOUNTER — APPOINTMENT (EMERGENCY)
Dept: NON INVASIVE DIAGNOSTICS | Facility: HOSPITAL | Age: 49
End: 2024-12-13
Payer: MEDICARE

## 2024-12-13 VITALS
TEMPERATURE: 97.8 F | HEIGHT: 63 IN | HEART RATE: 59 BPM | RESPIRATION RATE: 18 BRPM | BODY MASS INDEX: 25.69 KG/M2 | SYSTOLIC BLOOD PRESSURE: 155 MMHG | WEIGHT: 145 LBS | DIASTOLIC BLOOD PRESSURE: 79 MMHG | OXYGEN SATURATION: 99 %

## 2024-12-13 DIAGNOSIS — M79.652 LEFT THIGH PAIN: Primary | ICD-10-CM

## 2024-12-13 PROCEDURE — 99284 EMERGENCY DEPT VISIT MOD MDM: CPT | Performed by: PHYSICIAN ASSISTANT

## 2024-12-13 PROCEDURE — 93971 EXTREMITY STUDY: CPT

## 2024-12-13 PROCEDURE — 99283 EMERGENCY DEPT VISIT LOW MDM: CPT

## 2024-12-13 PROCEDURE — 93971 EXTREMITY STUDY: CPT | Performed by: SURGERY

## 2024-12-13 PROCEDURE — 73552 X-RAY EXAM OF FEMUR 2/>: CPT

## 2024-12-13 NOTE — ED PROVIDER NOTES
Time reflects when diagnosis was documented in both MDM as applicable and the Disposition within this note       Time User Action Codes Description Comment    12/13/2024  4:40 PM Staci Mccray Add [M79.652] Left thigh pain           ED Disposition       ED Disposition   Discharge    Condition   Stable    Date/Time   Fri Dec 13, 2024  4:40 PM    Comment   Shweta Nolan discharge to home/self care.                   Assessment & Plan       Medical Decision Making  Patient is a 49-year-old female presenting to the ED for evaluation of left upper leg pain x 3 weeks.    DDx including but not limited to: sprain, strain, rhabdomyolysis, myositis, fracture, contusion, metabolic abnormality, radiculopathy; doubt DVT or arterial occlusion.      X-ray shows no evidence of acute fracture or osseous abnormality.  Venous duplex negative for DVT.  No overlying skin changes or cellulitis.  Normal pulses, strength and sensation throughout the extremity.  Patient primarily has tenderness along the medial aspect of the proximal to mid thigh which seems to be localized to the sartorius muscle - pain may be secondary to a sartorius muscle strain/sprain.  Symptoms could be related to a lumbar radiculopathy, though she denies any low back pain.  She is able to ambulate on the extremity without assistance.  Patient was advised to schedule an appointment with orthopedics for follow-up and further evaluation or return to the ED if she experiences any new or worsening symptoms.    The management plan was discussed in detail with the patient at bedside and all questions were answered. Strict ED return instructions were discussed at bedside. Prior to discharge, both verbal and written instructions were provided. We discussed the signs and symptoms that should prompt the patient to return to the ED. All questions were answered and the patient was comfortable with the plan of care and discharged home. The patient agrees to return to the  Emergency Department for concerns and/or progression of illness.     Amount and/or Complexity of Data Reviewed  Radiology: ordered.             Medications - No data to display    ED Risk Strat Scores                          SBIRT 20yo+      Flowsheet Row Most Recent Value   Initial Alcohol Screen: US AUDIT-C     1. How often do you have a drink containing alcohol? 0 Filed at: 12/13/2024 1085   2. How many drinks containing alcohol do you have on a typical day you are drinking?  0 Filed at: 12/13/2024 9811   3b. FEMALE Any Age, or MALE 65+: How often do you have 4 or more drinks on one occassion? 0 Filed at: 12/13/2024 1459   Audit-C Score 0 Filed at: 12/13/2024 1459   IMAN: How many times in the past year have you...    Used an illegal drug or used a prescription medication for non-medical reasons? Never Filed at: 12/13/2024 1706                            History of Present Illness       Chief Complaint   Patient presents with    Leg Pain     According to the patient she has had left upper leg for the last 3 weeks.  Patient reports that he pain is radiating down the leg.       Past Medical History:   Diagnosis Date    Abdominal pain     Acute cystitis with hematuria 01/03/2020    Brain condition     Pseudotumor Cerebri     Chronic kidney disease     COVID-19 virus infection 11/10/2022    De Quervain's disease (tenosynovitis)     Esophageal perforation     Gastric leak     GERD (gastroesophageal reflux disease)     Headache     Idiopathic intracranial hypertension     Kidney stone     Moderate protein-calorie malnutrition (HCC) 02/21/2018    No blood products     per pt: personal and [employer] beliefs. surgeon office aware 12/13/19    Papilledema, both eyes     PONV (postoperative nausea and vomiting)     Postgastrectomy malabsorption     Presence of lumboperitoneal shunt     Resolved: Sep 20, 2017    Rotator cuff tendinitis     Resolved: Aug 23, 2017    Visual field defect       Past Surgical History:   Procedure  "Laterality Date    BONE EXOSTOSIS EXCISION Right 11/16/2023    Procedure: EXCISION EXOSTOSIS 5TH TOE RIght;  Surgeon: Sri Herring DPM;  [facility]: [facility];  Service: Podiatry    BONE EXOSTOSIS EXCISION Right 2/29/2024    Procedure: EXCISION EXOSTOSIS 5TH TOE;  Surgeon: Sri Herring DPM;  [facility]: [facility];  Service: Podiatry    BREAST BIOPSY Left 1993    benign    CSF SHUNT      LP shunt - 2015 -  shunt - 2017    ESOPHAGOGASTRODUODENOSCOPY N/A 02/23/2018    Procedure: ESOPHAGOGASTRODUODENOSCOPY (EGD) WITH ESOPHAGEAL STENT PLACEMENT;  Surgeon: Sergio Chung MD;  [facility];  Service: Thoracic    ESOPHAGOGASTRODUODENOSCOPY N/A 02/23/2018    Procedure: ESOPHAGOGASTRODUODENOSCOPY (EGD) WITH REMOVAL ESOPHAGEAL STENT  AND REPLACEMENT WITH 23mm X155mm WALLFLEX ESOPHAGEAL STENT;  Surgeon: Sergio Chung MD;  [facility];  Service: Thoracic    ESOPHAGOGASTRODUODENOSCOPY N/A 03/28/2018    Procedure: ESOPHAGOGASTRODUODENOSCOPY (EGD) with PEJ placement.;  Surgeon: Andrea Gonzales MD;  [facility];  Service: Gastroenterology    ESOPHAGOGASTRODUODENOSCOPY N/A 04/05/2018    Procedure: ESOPHAGOGASTRODUODENOSCOPY (EGD) with botox injection and kaofed placement;  Surgeon: Naomie Leroy DO;  [facility];  Service: Gastroenterology    ESOPHAGOGASTRODUODENOSCOPY N/A 04/10/2018    Procedure: ESOPHAGOGASTRODUODENOSCOPY (EGD) with Kaofed placement;  Surgeon: Saroj Kirkland MD;  [facility];  Service: Gastroenterology    ESOPHAGOSCOPY WITH STENT INSERTION N/A 01/24/2018    Procedure: INSERTION STENT ESOPHAGEAL;  Surgeon: Gonsalo Yang MD;  [facility];  Service: Gastroenterology    EYE SURGERY Bilateral 1980    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)     FL LUMBAR PUNCTURE DIAGNOSTIC  4/10/2017    FL RETROGRADE PYELOGRAM  06/24/2020    FL RETROGRADE PYELOGRAM  08/21/2021    FL RETROGRADE PYELOGRAM  5/4/2023    FL RETROGRADE PYELOGRAM  5/18/2023    GASTRIC BYPASS  " 12/19/2016    Lap sleeve gastrectomy w/shunt length shortening procedure    HIATAL HERNIA REPAIR      HYSTERECTOMY  03/14/2013    IR LUMBAR PUNCTURE  08/29/2018    LAPAROTOMY N/A 01/25/2018    Procedure: Exploratory Laparotomy, wash out,placement of drains, placement of NG feeding tube ;  Surgeon: Ruperto Jasso DO;  [facility];  Service: General    LUMBAR PERITONEAL SHUNT  11/19/2015    Laparoscopic assisted    NY BREAST REDUCTION Bilateral 03/25/2022    Procedure: BILATERAL BREAST REDUCTION;  Surgeon: Indio Williamson MD;  [facility];  Service: Plastics    NY CRTJ SHUNT [id number] Right 12/18/2019    Procedure: IMAGE GUIDED INSERTION OF RIGHT CORONAL VENTRICULAR-ATRIAL SHUNT;  Surgeon: Edouard Pires MD;  [facility];  Service: Neurosurgery    NY CRTJ SHUNT [id number] TERMINUS Right 05/31/2017    Procedure: IMAGE GUIDED CORONAL PLACEMENT OF PROGRAMABLE VENTRICULAR-PERITONEAL SHUNT, REMOVAL OF LP SHUNT ;  Surgeon: Edouard Pires MD;  [facility];  Service: Neurosurgery    NY CYSTO BLADDER W/URETERAL CATHETERIZATION N/A 06/06/2020    Procedure: Bilateral CYSTOSCOPY RETROGRADE PYELOGRAM WITH INSERTION STENT URETERAL;  Surgeon: Moe Mcgrath MD;  Location: [facility];  Service: Urology    NY CYSTO BLADDER W/URETERAL CATHETERIZATION Bilateral 5/4/2023    Procedure: CYSTOSCOPY RETROGRADE PYELOGRAM WITH INSERTION STENT URETERAL;  Surgeon: Moe Mcgrath MD;  [facility]: [facility];  Service: Urology    NY CYSTO/URETERO W/LITHOTRIPSY &INDWELL STENT INSRT Bilateral 06/24/2020    Procedure: CYSTOSCOPY URETEROSCOPY WITH LITHOTRIPSY HOLMIUM LASER, RETROGRADE PYELOGRAM AND exchange bilateral  STENTs URETERAL;  Surgeon: Mark Godfrey MD;  [facility];  Service: Urology    NY CYSTO/URETERO W/LITHOTRIPSY &INDWELL STENT INSRT Left 08/21/2021    Procedure: CYSTOSCOPY; LEFT URETEROSCOPY WITH RETROGRADE PYELOGRAM, REMOVAL OF STONE AND INSERTION LEFT  URETERAL STENT;  Surgeon: Jamie Nuñez MD;  [facility];  Service: Urology    NY CYSTO/URETERO W/LITHOTRIPSY &INDWELL STENT INSRT N/A 5/18/2023    Procedure: CYSTOSCOPY URETEROSCOPY WITH LITHOTRIPSY HOLMIUM LASER, RETROGRADE PYELOGRAM, REMOVAL OF BILATERAL STENTS,  AND INSERTION STENT URETERAL LEFT ,BASKET EXTRACTION OF STONE LEFT SIDE;  Surgeon: Jamie Nuñez MD;  [facility];  Service: Urology    NY EGD TRANSORAL BIOPSY SINGLE/MULTIPLE N/A 06/27/2018    Procedure: ESOPHAGOGASTRODUODENOSCOPY (EGD) with padlock clip placement;  Surgeon: Lisbet Gonzalez MD;  [facility];  Service: Bariatrics    NY RMVL COMPL CSF SHUNT SYSTEM W/O RPLCMT SHUNT Right 02/05/2018    Procedure: Removal of  shunt;  Surgeon: Edouard Pires MD;  [facility];  Service: Neurosurgery    NY RPLCMT/REVJ CSF SHUNT VALVE/CATH SHUNT SYS Right 01/25/2018    Procedure: Externalization of right-sided SHUNT VENTRICULAR-PERITONEAL in anterior chest wall ribs two and three level  ;  Surgeon: Miquel Duarte MD;  [facility];  Service: Neurosurgery    REDUCTION MAMMAPLASTY Bilateral     WRIST SURGERY Right     x3 2006, 2008      Family History   Problem Relation Age of Onset    Kidney cancer Mother 66    No Known Problems Sister     No Known Problems Daughter     No Known Problems Maternal Grandmother     Colon cancer Maternal Grandfather 39    No Known Problems Paternal Grandmother     Cancer Paternal Grandfather     Thyroid cancer Brother 40    No Known Problems Maternal Aunt     No Known Problems Maternal Aunt     Lung cancer Maternal Uncle 61    No Known Problems Paternal Aunt     Cancer Family         Gastric    Leukemia Family     Colon cancer Family     Pancreatic cancer Family       Social History     Tobacco Use    Smoking status: Former     Current packs/day: 0.00     Average packs/day: 0.5 packs/day for 20.6 years (10.3 ttl pk-yrs)     Types: Cigarettes     Start date: 1/1/1992      Quit date: 2012     Years since quittin.3    Smokeless tobacco: Never   Vaping Use    Vaping status: Every Day    Substances: THC, CBD   Substance Use Topics    Alcohol use: Never    Drug use: Not Currently     Frequency: 7.0 times per week     Types: Marijuana     Comment: medical marijuana, vaping & topical Last used 11/10/23      E-Cigarette/Vaping    E-Cigarette Use Current Every Day User     Comments medical marijuana-24  lotion       E-Cigarette/Vaping Substances    Nicotine No     THC Yes     CBD Yes     Flavoring No     Other Yes lotion    Unknown No       I have reviewed and agree with the history as documented.     Patient is a 49-year-old female presenting to the ED for evaluation of left upper leg pain x 3 weeks.  Patient states that she has had pain in the left thigh for the past 3 weeks. The pain starts in the proximal thigh/groin region and radiates to the lateral aspect of the knee. She denies any falls, trauma or inciting injuries.  She denies any obvious swelling, redness or warmth. She states that the pain is relatively constant and denies any specific aggravating/alleviating factors.  She denies any pain in the low back/gluteal region, numbness or weakness of the lower extremities, bowel/bladder incontinence, urinary retention or saddle anesthesia. She denies any fevers, chills, chest pain, SOB, nausea, vomiting, abdominal pain, diarrhea, constipation, flank pain or urinary symptoms. She is on an estradiol patch but denies any recent travel, recent surgery or history of PE/DVT.          Review of Systems   Constitutional:  Negative for chills and fever.   HENT:  Negative for congestion, rhinorrhea and sore throat.    Eyes:  Negative for visual disturbance.   Respiratory:  Negative for cough and shortness of breath.    Cardiovascular:  Negative for chest pain, palpitations and leg swelling.   Gastrointestinal:  Negative for abdominal pain, constipation, diarrhea, nausea and  vomiting.   Genitourinary:  Negative for dysuria, flank pain, frequency and hematuria.   Musculoskeletal:  Positive for myalgias (left thigh pain). Negative for back pain and neck pain.   Skin:  Negative for rash.   Neurological:  Negative for dizziness, syncope, weakness and headaches.   All other systems reviewed and are negative.          Objective       ED Triage Vitals   Temperature Pulse Blood Pressure Respirations SpO2 Patient Position - Orthostatic VS   12/13/24 1500 12/13/24 1500 12/13/24 1500 12/13/24 1500 12/13/24 1500 12/13/24 1500   97.8 °F (36.6 °C) 59 155/79 18 99 % Lying      Temp Source Heart Rate Source BP Location FiO2 (%) Pain Score    12/13/24 1500 -- 12/13/24 1500 -- 12/13/24 1458    Oral  Right arm  6      Vitals      Date and Time Temp Pulse SpO2 Resp BP Pain Score FACES Pain Rating User   12/13/24 1500 97.8 °F (36.6 °C) 59 99 % 18 155/79 [place] [facility] [place]   12/13/24 1458 -- -- -- -- -- 6 [place] [facility] [place]            Physical Exam  Vitals and nursing note reviewed.   Constitutional:       General: She is awake.      Appearance: Normal appearance. She is well-developed. She is not toxic-appearing or diaphoretic.   HENT:      Head: Normocephalic and atraumatic.      Right Ear: External ear normal.      Left Ear: External ear normal.      Nose: Nose normal.      Mouth/Throat:      Lips: Pink.      Mouth: Mucous membranes are moist.   Eyes:      General: Lids are normal. No scleral icterus.     Conjunctiva/sclera: Conjunctivae normal.      Pupils: Pupils are equal, round, and reactive to light.   Cardiovascular:      Rate and Rhythm: Normal rate and regular rhythm.      Pulses: Normal pulses.           Radial pulses are 2+ on the right side and 2+ on the left side.      Heart sounds: Normal heart sounds, S1 normal and S2 normal.   Pulmonary:      Effort: Pulmonary effort is normal. No accessory muscle usage.      Breath sounds: Normal breath sounds. No stridor. No decreased breath sounds, wheezing, rhonchi or  rales.   Abdominal:      General: Abdomen is flat. Bowel sounds are normal. There is no distension.      Palpations: Abdomen is soft.      Tenderness: There is no abdominal tenderness. There is no right CVA tenderness, left CVA tenderness, guarding or rebound.      Comments: Abdomen is soft, nontender and nondistended.  No rebound tenderness, guarding or rigidity.   Musculoskeletal:      Cervical back: Full passive range of motion without pain and neck supple. No signs of trauma. No pain with movement.      Right lower leg: No edema.      Left lower leg: No edema.        Legs:       Comments: Left leg: Tenderness over the medial aspect of the thigh from just below the inguinal region to the mid thigh.  No obvious swelling.  No overlying erythema/warmth.  Normal range of motion of the leg.  Strength 5/5.  Sensation intact.  PT/DP pulses 2+.  Cap refill <2 seconds.  Femoral pulse 2+.   Lymphadenopathy:      Cervical: No cervical adenopathy.   Skin:     General: Skin is warm and dry.      Capillary Refill: Capillary refill takes less than 2 seconds.      Coloration: Skin is not cyanotic, jaundiced or pale.   Neurological:      Mental Status: She is alert and oriented to person, place, and time.      GCS: GCS eye subscore is 4. GCS verbal subscore is 5. GCS motor subscore is 6.      Gait: Gait normal.   Psychiatric:         Mood and Affect: Mood normal.         Speech: Speech normal.         Behavior: Behavior is cooperative.         Results Reviewed       None            VAS lower limb venous duplex study, unilateral/limited   Final Interpretation by Wendy Mckenzie MD (12/13 5625)      XR femur 2 views LEFT   Final Interpretation by Chuy Garner MD (12/13 8357)      No acute osseous abnormality.         Computerized Assisted Algorithm (CAA) may have been used to analyze all applicable images.         Workstation performed: [id number]             Procedures    ED Medication and Procedure Management    Prior to Admission Medications   Prescriptions Last Dose Informant Patient Reported? Taking?   ESTRADIOL TD   Yes No   Sig: Place 1 patch on the skin 2 (two) times a week Patient unaware of dosage   Multiple Vitamin (MULTIVITAMIN) tablet  Self Yes No   Sig: Take 1 tablet by mouth daily Wears a patch   Nutritional Supplements (Menopause Formula) TABS  Self Yes No   Sig: Take by mouth 2 gummies daily   atorvastatin (LIPITOR) 10 mg tablet  Self No No   Sig: take 1 tablet by mouth at bedtime   Patient not taking: Reported on 11/20/2024   butalbital-acetaminophen-caffeine (FIORICET,ESGIC) -40 mg per tablet   No No   Sig: Take 1 tablet by mouth every 6 (six) hours as needed for headaches   dexlansoprazole (DEXILANT) 60 MG capsule   No No   Sig: Take 1 capsule (60 mg total) by mouth daily   fezolinetant (Veozah) tablet   No No   Sig: Take 1 tablet (45 mg total) by mouth daily   Patient not taking: Reported on 11/20/2024   meclizine (ANTIVERT) 25 mg tablet  Self No No   Sig: Take 1 tablet (25 mg total) by mouth 3 (three) times a day as needed for dizziness for up to 7 days   Patient not taking: Reported on 11/20/2024   methazolamide (NEPTAZANE) 25 MG tablet   No No   Sig: take 1 tablet by mouth three times a day   Patient not taking: Reported on 11/20/2024   naloxone (NARCAN) 4 mg/0.1 mL nasal spray  Self No No   Sig: Administer 1 spray into a nostril. If no response after 2-3 minutes, give another dose in the other nostril using a new spray.   Patient not taking: Reported on 3/13/2024   rimegepant sulfate (Nurtec) 75 mg TBDP   No No   Sig: Take 1 tablet (75 mg total) by mouth daily as needed (migraine)      Facility-Administered Medications: None     Discharge Medication List as of 12/13/2024  4:41 PM        CONTINUE these medications which have NOT CHANGED    Details   atorvastatin (LIPITOR) 10 mg tablet take 1 tablet by mouth at bedtime, Starting Sun 7/28/2024, Normal      butalbital-acetaminophen-caffeine  (FIORICET,ESGIC) -40 mg per tablet Take 1 tablet by mouth every 6 (six) hours as needed for headaches, Starting Wed 11/20/2024, Normal      dexlansoprazole (DEXILANT) 60 MG capsule Take 1 capsule (60 mg total) by mouth daily, Starting Thu 9/26/2024, Normal      ESTRADIOL TD Place 1 patch on the skin 2 (two) times a week Patient unaware of dosage, Historical Med      fezolinetant (Veozah) tablet Take 1 tablet (45 mg total) by mouth daily, Starting Tue 9/17/2024, Normal      meclizine (ANTIVERT) 25 mg tablet Take 1 tablet (25 mg total) by mouth 3 (three) times a day as needed for dizziness for up to 7 days, Starting Tue 7/23/2024, Until 2359, Normal      methazolamide (NEPTAZANE) 25 MG tablet take 1 tablet by mouth three times a day, Starting Wed 10/2/2024, Normal      Multiple Vitamin (MULTIVITAMIN) tablet Take 1 tablet by mouth daily Wears a patch, Historical Med      naloxone (NARCAN) 4 mg/0.1 mL nasal spray Administer 1 spray into a nostril. If no response after 2-3 minutes, give another dose in the other nostril using a new spray., Normal      Nutritional Supplements (Menopause Formula) TABS Take by mouth 2 gummies daily, Historical Med      rimegepant sulfate (Nurtec) 75 mg TBDP Take 1 tablet (75 mg total) by mouth daily as needed (migraine), Starting Wed 11/20/2024, Normal             ED SEPSIS DOCUMENTATION   Time reflects when diagnosis was documented in both MDM as applicable and the Disposition within this note       Time User Action Codes Description Comment    12/13/2024  4:40 PM Staci Mccray Add [M79.652] Left thigh pain                  Staci Mccray PA-C  12/15/24 3176

## 2024-12-24 DIAGNOSIS — G93.2 PSEUDOTUMOR CEREBRI: ICD-10-CM

## 2024-12-26 RX ORDER — METHAZOLAMIDE 25 MG/1
25 TABLET ORAL 3 TIMES DAILY
Qty: 90 TABLET | Refills: 2 | Status: SHIPPED | OUTPATIENT
Start: 2024-12-26

## 2025-01-27 DIAGNOSIS — E78.00 HYPERCHOLESTEREMIA: ICD-10-CM

## 2025-01-27 RX ORDER — ATORVASTATIN CALCIUM 10 MG/1
10 TABLET, FILM COATED ORAL
Qty: 90 TABLET | Refills: 1 | Status: SHIPPED | OUTPATIENT
Start: 2025-01-27

## 2025-02-13 ENCOUNTER — RESULTS FOLLOW-UP (OUTPATIENT)
Age: 50
End: 2025-02-13

## 2025-02-13 ENCOUNTER — APPOINTMENT (OUTPATIENT)
Dept: LAB | Facility: HOSPITAL | Age: 50
End: 2025-02-13
Payer: MEDICARE

## 2025-02-13 DIAGNOSIS — K21.9 GERD (GASTROESOPHAGEAL REFLUX DISEASE): ICD-10-CM

## 2025-02-13 DIAGNOSIS — Z98.84 BARIATRIC SURGERY STATUS: ICD-10-CM

## 2025-02-13 DIAGNOSIS — E78.00 HYPERCHOLESTEREMIA: ICD-10-CM

## 2025-02-13 DIAGNOSIS — K91.2 POSTSURGICAL MALABSORPTION: ICD-10-CM

## 2025-02-13 DIAGNOSIS — D50.8 IRON DEFICIENCY ANEMIA SECONDARY TO INADEQUATE DIETARY IRON INTAKE: ICD-10-CM

## 2025-02-13 DIAGNOSIS — Z48.815 ENCOUNTER FOR SURGICAL AFTERCARE FOLLOWING SURGERY OF DIGESTIVE SYSTEM: ICD-10-CM

## 2025-02-13 LAB
25(OH)D3 SERPL-MCNC: 80.5 NG/ML (ref 30–100)
ALBUMIN SERPL BCG-MCNC: 4.5 G/DL (ref 3.5–5)
ALP SERPL-CCNC: 73 U/L (ref 34–104)
ALT SERPL W P-5'-P-CCNC: 12 U/L (ref 7–52)
ANION GAP SERPL CALCULATED.3IONS-SCNC: 7 MMOL/L (ref 4–13)
AST SERPL W P-5'-P-CCNC: 15 U/L (ref 13–39)
BASOPHILS # BLD AUTO: 0.05 THOUSANDS/ÂΜL (ref 0–0.1)
BASOPHILS NFR BLD AUTO: 1 % (ref 0–1)
BILIRUB SERPL-MCNC: 0.58 MG/DL (ref 0.2–1)
BUN SERPL-MCNC: 16 MG/DL (ref 5–25)
CALCIUM SERPL-MCNC: 9.4 MG/DL (ref 8.4–10.2)
CHLORIDE SERPL-SCNC: 105 MMOL/L (ref 96–108)
CHOLEST SERPL-MCNC: 307 MG/DL (ref ?–200)
CO2 SERPL-SCNC: 26 MMOL/L (ref 21–32)
CREAT SERPL-MCNC: 0.62 MG/DL (ref 0.6–1.3)
EOSINOPHIL # BLD AUTO: 0.32 THOUSAND/ÂΜL (ref 0–0.61)
EOSINOPHIL NFR BLD AUTO: 6 % (ref 0–6)
ERYTHROCYTE [DISTWIDTH] IN BLOOD BY AUTOMATED COUNT: 12.8 % (ref 11.6–15.1)
FERRITIN SERPL-MCNC: 63 NG/ML (ref 11–307)
GFR SERPL CREATININE-BSD FRML MDRD: 105 ML/MIN/1.73SQ M
GLUCOSE P FAST SERPL-MCNC: 87 MG/DL (ref 65–99)
HCT VFR BLD AUTO: 47.9 % (ref 34.8–46.1)
HDLC SERPL-MCNC: 67 MG/DL
HGB BLD-MCNC: 15.8 G/DL (ref 11.5–15.4)
IMM GRANULOCYTES # BLD AUTO: 0.02 THOUSAND/UL (ref 0–0.2)
IMM GRANULOCYTES NFR BLD AUTO: 0 % (ref 0–2)
IRON SATN MFR SERPL: 27 % (ref 15–50)
IRON SERPL-MCNC: 92 UG/DL (ref 50–212)
LDLC SERPL CALC-MCNC: 223 MG/DL (ref 0–100)
LYMPHOCYTES # BLD AUTO: 1.38 THOUSANDS/ÂΜL (ref 0.6–4.47)
LYMPHOCYTES NFR BLD AUTO: 24 % (ref 14–44)
MAGNESIUM SERPL-MCNC: 1.9 MG/DL (ref 1.9–2.7)
MCH RBC QN AUTO: 29.2 PG (ref 26.8–34.3)
MCHC RBC AUTO-ENTMCNC: 33 G/DL (ref 31.4–37.4)
MCV RBC AUTO: 89 FL (ref 82–98)
MONOCYTES # BLD AUTO: 0.3 THOUSAND/ÂΜL (ref 0.17–1.22)
MONOCYTES NFR BLD AUTO: 5 % (ref 4–12)
NEUTROPHILS # BLD AUTO: 3.61 THOUSANDS/ÂΜL (ref 1.85–7.62)
NEUTS SEG NFR BLD AUTO: 64 % (ref 43–75)
NRBC BLD AUTO-RTO: 0 /100 WBCS
PLATELET # BLD AUTO: 199 THOUSANDS/UL (ref 149–390)
PMV BLD AUTO: 9.7 FL (ref 8.9–12.7)
POTASSIUM SERPL-SCNC: 4.2 MMOL/L (ref 3.5–5.3)
PROT SERPL-MCNC: 7 G/DL (ref 6.4–8.4)
PTH-INTACT SERPL-MCNC: 100 PG/ML (ref 12–88)
RBC # BLD AUTO: 5.41 MILLION/UL (ref 3.81–5.12)
SODIUM SERPL-SCNC: 138 MMOL/L (ref 135–147)
TIBC SERPL-MCNC: 336 UG/DL (ref 250–450)
TRANSFERRIN SERPL-MCNC: 240 MG/DL (ref 203–362)
TRIGL SERPL-MCNC: 84 MG/DL (ref ?–150)
UIBC SERPL-MCNC: 244 UG/DL (ref 155–355)
VIT B12 SERPL-MCNC: 343 PG/ML (ref 180–914)
WBC # BLD AUTO: 5.68 THOUSAND/UL (ref 4.31–10.16)

## 2025-02-13 PROCEDURE — 84425 ASSAY OF VITAMIN B-1: CPT

## 2025-02-13 PROCEDURE — 80053 COMPREHEN METABOLIC PANEL: CPT

## 2025-02-13 PROCEDURE — 84630 ASSAY OF ZINC: CPT

## 2025-02-13 PROCEDURE — 83540 ASSAY OF IRON: CPT

## 2025-02-13 PROCEDURE — 36415 COLL VENOUS BLD VENIPUNCTURE: CPT

## 2025-02-13 PROCEDURE — 82607 VITAMIN B-12: CPT

## 2025-02-13 PROCEDURE — 83735 ASSAY OF MAGNESIUM: CPT

## 2025-02-13 PROCEDURE — 82728 ASSAY OF FERRITIN: CPT

## 2025-02-13 PROCEDURE — 83970 ASSAY OF PARATHORMONE: CPT

## 2025-02-13 PROCEDURE — 83550 IRON BINDING TEST: CPT

## 2025-02-13 PROCEDURE — 80061 LIPID PANEL: CPT

## 2025-02-13 PROCEDURE — 84590 ASSAY OF VITAMIN A: CPT

## 2025-02-13 PROCEDURE — 85025 COMPLETE CBC W/AUTO DIFF WBC: CPT

## 2025-02-13 PROCEDURE — 82306 VITAMIN D 25 HYDROXY: CPT

## 2025-02-15 LAB — VIT B1 BLD-SCNC: 94.3 NMOL/L (ref 66.5–200)

## 2025-02-17 LAB — VIT A SERPL-MCNC: 36.2 UG/DL (ref 20.1–62)

## 2025-02-18 ENCOUNTER — RESULTS FOLLOW-UP (OUTPATIENT)
Dept: BARIATRICS | Facility: CLINIC | Age: 50
End: 2025-02-18

## 2025-02-18 ENCOUNTER — OFFICE VISIT (OUTPATIENT)
Dept: HEMATOLOGY ONCOLOGY | Facility: CLINIC | Age: 50
End: 2025-02-18
Payer: MEDICARE

## 2025-02-18 VITALS
BODY MASS INDEX: 28.7 KG/M2 | WEIGHT: 162 LBS | SYSTOLIC BLOOD PRESSURE: 126 MMHG | TEMPERATURE: 97.9 F | DIASTOLIC BLOOD PRESSURE: 76 MMHG | OXYGEN SATURATION: 99 % | HEART RATE: 56 BPM | RESPIRATION RATE: 18 BRPM | HEIGHT: 63 IN

## 2025-02-18 DIAGNOSIS — R79.89 HIGH SERUM PARATHYROID HORMONE (PTH): ICD-10-CM

## 2025-02-18 DIAGNOSIS — Z98.84 BARIATRIC SURGERY STATUS: Primary | ICD-10-CM

## 2025-02-18 DIAGNOSIS — D50.8 IRON DEFICIENCY ANEMIA SECONDARY TO INADEQUATE DIETARY IRON INTAKE: Primary | ICD-10-CM

## 2025-02-18 DIAGNOSIS — E53.8 B12 DEFICIENCY: ICD-10-CM

## 2025-02-18 LAB — ZINC SERPL-MCNC: 64 UG/DL (ref 44–115)

## 2025-02-18 PROCEDURE — 99214 OFFICE O/P EST MOD 30 MIN: CPT | Performed by: INTERNAL MEDICINE

## 2025-02-18 NOTE — PROGRESS NOTES
Name: Shweta Nolan      : 1975      MRN: [id number]  Encounter Provider: Talia Mcfarlane MD  Encounter Date: 2025   Encounter department: [facility] [place]  :  Assessment & Plan  Iron deficiency anemia secondary to inadequate dietary iron intake  The patient does not seem to be anemic at all.  In fact her hemoglobin and hematocrit are slightly above the high normal range.  The etiology is most likely reactive.  Will continue to monitor her closely.  I did ask her to follow-up with her neurology team regarding her neurological symptoms.    Orders:    CBC and differential; Future    Comprehensive metabolic panel; Future    Ferritin; Future    Iron Panel (Includes Ferritin, Iron Sat%, Iron, and TIBC); Future    Magnesium; Future    Vitamin B12; Future    B12 deficiency  She seems to have borderline low normal vitamin B12 level.  I did ask her to consider taking vitamin B12 supplements frequently.  Orders:    CBC and differential; Future    Comprehensive metabolic panel; Future    Ferritin; Future    Iron Panel (Includes Ferritin, Iron Sat%, Iron, and TIBC); Future    Magnesium; Future    Vitamin B12; Future        Return in about 6 months (around 2025) for Office Visit 20 min, Labs.    History of Present Illness   Chief Complaint   Patient presents with    Follow-up   HPI:     Patient is a pleasant 48-year-old female with complicated past medical history who presented originally for further evaluation and treatment of her microcytic anemia/iron deficiency.  She had gastric sleeve surgery  followed by laparoscopic paraesophageal hernia repair in  which later resulted in GE junction leak/fistula requiring open repair and lead to sepsis/inpatient hospitalization for 5 months.  She also was diagnosed with pseudotumor cerebri in 2015 had  shunt placed which had to be removed with her sepsis and later had [facility].  She has  PTSD as a result of her  above-mentioned events, kidney stones and migraines.  She does have a neurologist and gastroenterologist who is monitoring her.      Her laboratory studies from 08/22/2021 showed microcytic hypochromic anemia H&H 8.6/30, MCV 77, MCH 22.1 her platelet count and white cells or normal.  BMP normal. She had significant iron deficiency iron saturation 5%, TIBC 414, serum iron 22 with ferritin 2. Her iron deficiency was corrected with IV iron Venofer x6 treatments November/December 2021           Interval history:  The patient came today for a follow-up visit.  She continues to complain about pressure headaches and occasional abdominal pain.  Blood work on 2/13/2025 showed hemoglobin of 15.8 with hematocrit of 47.9%.  White cells and platelets were within normal range with normal white cell differential.  CMP was entirely normal.  Vitamin B12 borderline low normal at 347.  Her iron panel showed saturation of 27% with ferritin of 63.  She denied obvious bleeding from any sites.      Review of Systems   Constitutional:  Positive for fatigue. Negative for chills and fever.   HENT:  Positive for hearing loss. Negative for ear pain and sore throat.    Eyes:  Negative for pain and visual disturbance.   Respiratory:  Negative for cough and shortness of breath.    Cardiovascular:  Negative for chest pain and palpitations.   Gastrointestinal:  Positive for constipation, nausea and vomiting. Negative for abdominal pain.   Genitourinary:  Negative for dysuria and hematuria.   Musculoskeletal:  Negative for arthralgias and back pain.   Skin:  Negative for color change and rash.   Neurological:  Positive for dizziness and headaches. Negative for seizures and syncope.   Psychiatric/Behavioral:  Positive for sleep disturbance.    All other systems reviewed and are negative.    Medical History Reviewed by provider this encounter:  Tobacco  Allergies  Meds  Problems  Med Hx  Surg Hx  Fam Hx     .  Current Outpatient Medications on  File Prior to Visit   Medication Sig Dispense Refill    atorvastatin (LIPITOR) 10 mg tablet take 1 tablet by mouth at bedtime 90 tablet 1    butalbital-acetaminophen-caffeine (FIORICET,ESGIC) -40 mg per tablet Take 1 tablet by mouth every 6 (six) hours as needed for headaches 15 tablet 4    dexlansoprazole (DEXILANT) 60 MG capsule Take 1 capsule (60 mg total) by mouth daily 90 capsule 3    ESTRADIOL TD Place 1 patch on the skin 2 (two) times a week Patient unaware of dosage      methazolamide (NEPTAZANE) 25 MG tablet take 1 tablet by mouth three times a day 90 tablet 2    Multiple Vitamin (MULTIVITAMIN) tablet Take 1 tablet by mouth daily Wears a patch      rimegepant sulfate (Nurtec) 75 mg TBDP Take 1 tablet (75 mg total) by mouth daily as needed (migraine) 16 tablet 11    fezolinetant (Veozah) tablet Take 1 tablet (45 mg total) by mouth daily (Patient not taking: Reported on 2024) 30 tablet 0    meclizine (ANTIVERT) 25 mg tablet Take 1 tablet (25 mg total) by mouth 3 (three) times a day as needed for dizziness for up to 7 days (Patient not taking: Reported on 2024) 21 tablet 0    naloxone (NARCAN) 4 mg/0.1 mL nasal spray Administer 1 spray into a nostril. If no response after 2-3 minutes, give another dose in the other nostril using a new spray. (Patient not taking: Reported on 3/13/2024) 1 each 1    Nutritional Supplements (Menopause Formula) TABS Take by mouth 2 gummies daily (Patient not taking: Reported on 2025)       No current facility-administered medications on file prior to visit.      Social History     Tobacco Use    Smoking status: Former     Current packs/day: 0.00     Average packs/day: 0.5 packs/day for 20.6 years (10.3 ttl pk-yrs)     Types: Cigarettes     Start date: 1992     Quit date: 2012     Years since quittin.4    Smokeless tobacco: Never   Vaping Use    Vaping status: Every Day    Substances: THC, CBD   Substance and Sexual Activity    Alcohol use: Never  "   Drug use: Not Currently     Frequency: 7.0 times per week     Types: Marijuana     Comment: medical marijuana, vaping & topical Last used 11/10/23    Sexual activity: Yes         Objective   /76 (BP Location: Left arm, Patient Position: Sitting, Cuff Size: Adult)   Pulse 56   Temp 97.9 °F (36.6 °C)   Resp 18   Ht 5' 3\" (1.6 m)   Wt 73.5 kg (162 lb)   LMP  (LMP Unknown)   SpO2 99%   BMI 28.70 kg/m²     Physical Exam  Constitutional:       General: She is not in acute distress.     Appearance: She is well-developed. She is not diaphoretic.   HENT:      Head: Normocephalic and atraumatic.      Nose: Nose normal.   Eyes:      General: No scleral icterus.        Right eye: No discharge.         Left eye: No discharge.      Conjunctiva/sclera: Conjunctivae normal.      Pupils: Pupils are equal, round, and reactive to light.   Neck:      Thyroid: No thyromegaly.      Vascular: No JVD.      Trachea: No tracheal deviation.   Cardiovascular:      Rate and Rhythm: Normal rate and regular rhythm.      Heart sounds: Normal heart sounds. No murmur heard.     No friction rub.   Pulmonary:      Effort: Pulmonary effort is normal. No respiratory distress.      Breath sounds: Normal breath sounds. No stridor. No wheezing or rales.   Chest:      Chest wall: No tenderness.   Abdominal:      General: There is no distension.      Palpations: Abdomen is soft. There is no hepatomegaly or splenomegaly.      Tenderness: There is no abdominal tenderness. There is no guarding or rebound.   Musculoskeletal:         General: No tenderness or deformity. Normal range of motion.      Cervical back: Normal range of motion and neck supple.   Lymphadenopathy:      Cervical: No cervical adenopathy.   Skin:     General: Skin is warm and dry.      Coloration: Skin is not pale.      Findings: No erythema or rash.   Neurological:      Mental Status: She is alert and oriented to person, place, and time.      Cranial Nerves: No cranial nerve " deficit.      Coordination: Coordination normal.      Deep Tendon Reflexes: Reflexes are normal and symmetric.   Psychiatric:         Behavior: Behavior normal.         Thought Content: Thought content normal.         Judgment: Judgment normal.         Labs: I have reviewed the following labs:  Results for orders placed or performed in visit on 02/13/25   Lipid Panel with Direct LDL reflex   Result Value Ref Range    Cholesterol 307 (H) See Comment mg/dL    Triglycerides 84 See Comment mg/dL    HDL, Direct 67 >=50 mg/dL    LDL Calculated 223 (H) 0 - 100 mg/dL   Comprehensive metabolic panel   Result Value Ref Range    Sodium 138 135 - 147 mmol/L    Potassium 4.2 3.5 - 5.3 mmol/L    Chloride 105 96 - 108 mmol/L    CO2 26 21 - 32 mmol/L    ANION GAP 7 4 - 13 mmol/L    BUN 16 5 - 25 mg/dL    Creatinine 0.62 0.60 - 1.30 mg/dL    Glucose, Fasting 87 65 - 99 mg/dL    Calcium 9.4 8.4 - 10.2 mg/dL    AST 15 13 - 39 U/L    ALT 12 7 - 52 U/L    Alkaline Phosphatase 73 34 - 104 U/L    Total Protein 7.0 6.4 - 8.4 g/dL    Albumin 4.5 3.5 - 5.0 g/dL    Total Bilirubin 0.58 0.20 - 1.00 mg/dL    eGFR 105 ml/min/1.73sq m   CBC and differential   Result Value Ref Range    WBC 5.68 4.31 - 10.16 Thousand/uL    RBC 5.41 (H) 3.81 - 5.12 Million/uL    Hemoglobin 15.8 (H) 11.5 - 15.4 g/dL    Hematocrit 47.9 (H) 34.8 - 46.1 %    MCV 89 82 - 98 fL    MCH 29.2 26.8 - 34.3 pg    MCHC 33.0 31.4 - 37.4 g/dL    RDW 12.8 11.6 - 15.1 %    MPV 9.7 8.9 - 12.7 fL    Platelets 199 149 - 390 Thousands/uL    nRBC 0 /100 WBCs    Segmented % 64 43 - 75 %    Immature Grans % 0 0 - 2 %    Lymphocytes % 24 14 - 44 %    Monocytes % 5 4 - 12 %    Eosinophils Relative 6 0 - 6 %    Basophils Relative 1 0 - 1 %    Absolute Neutrophils 3.61 1.85 - 7.62 Thousands/µL    Absolute Immature Grans 0.02 0.00 - 0.20 Thousand/uL    Absolute Lymphocytes 1.38 0.60 - 4.47 Thousands/µL    Absolute Monocytes 0.30 0.17 - 1.22 Thousand/µL    Eosinophils Absolute 0.32 0.00 - 0.61  Thousand/µL    Basophils Absolute 0.05 0.00 - 0.10 Thousands/µL   Vitamin B12   Result Value Ref Range    Vitamin B-12 343 180 - 914 pg/mL   Result Value Ref Range    Magnesium 1.9 1.9 - 2.7 mg/dL   PTH, intact   Result Value Ref Range    .0 (H) 12.0 - 88.0 pg/mL   Vitamin A   Result Value Ref Range    Vitamin A 36.2 20.1 - 62.0 ug/dL   Result Value Ref Range    Vitamin B1, Whole Blood 94.3 66.5 - 200.0 nmol/L   Vitamin D 25 hydroxy   Result Value Ref Range    Vit D, 25-Hydroxy 80.5 30.0 - 100.0 ng/mL   Result Value Ref Range    Zinc 64 44 - 115 ug/dL   TIBC Panel (incl. Iron, TIBC, % Iron Saturation)   Result Value Ref Range    Iron Saturation 27 15 - 50 %    TIBC 336 250 - 450 ug/dL    Iron 92 50 - 212 ug/dL    Transferrin 240 203 - 362 mg/dL    UIBC 244 155 - 355 ug/dL   Result Value Ref Range    Ferritin 63 11 - 307 ng/mL     *Note: Due to a large number of results and/or encounters for the requested time period, some results have not been displayed. A complete set of results can be found in Results Review.     Lab Results   Component Value Date/Time    WBC 5.68 02/13/2025 11:40 AM    RBC 5.41 (H) 02/13/2025 11:40 AM    Hemoglobin 15.8 (H) 02/13/2025 11:40 AM    Hematocrit 47.9 (H) 02/13/2025 11:40 AM    MCV 89 02/13/2025 11:40 AM    MCH 29.2 02/13/2025 11:40 AM    RDW 12.8 02/13/2025 11:40 AM    Platelets 199 02/13/2025 11:40 AM    Segmented % 64 02/13/2025 11:40 AM    Lymphocytes % 24 02/13/2025 11:40 AM    Monocytes % 5 02/13/2025 11:40 AM    Eosinophils Relative 6 02/13/2025 11:40 AM    Basophils Relative 1 02/13/2025 11:40 AM    Immature Grans % 0 02/13/2025 11:40 AM    Absolute Neutrophils 3.61 02/13/2025 11:40 AM      Lab Results   Component Value Date/Time    Sodium 138 02/13/2025 11:40 AM    Potassium 4.2 02/13/2025 11:40 AM    Chloride 105 02/13/2025 11:40 AM    CO2 26 02/13/2025 11:40 AM    ANION GAP 7 02/13/2025 11:40 AM    BUN 16 02/13/2025 11:40 AM    Creatinine 0.62 02/13/2025 11:40 AM     Glucose 83 07/23/2024 05:00 PM    Glucose, Fasting 87 02/13/2025 11:40 AM    Calcium 9.4 02/13/2025 11:40 AM    AST 15 02/13/2025 11:40 AM    ALT 12 02/13/2025 11:40 AM    Alkaline Phosphatase 73 02/13/2025 11:40 AM    Total Protein 7.0 02/13/2025 11:40 AM    Albumin 4.5 02/13/2025 11:40 AM    Total Bilirubin 0.58 02/13/2025 11:40 AM    eGFR 105 02/13/2025 11:40 AM      Lab Results   Component Value Date/Time    Iron 92 02/13/2025 11:40 AM    Iron Saturation 27 02/13/2025 11:40 AM    Ferritin 63 02/13/2025 11:40 AM    Vitamin B-12 343 02/13/2025 11:40 AM    Folate >22.3 07/16/2024 11:06 AM    Sed Rate 1 07/16/2024 11:06 AM    CRP 1.3 07/16/2024 11:06 AM        Radiology Results Review : No pertinent imaging studies reviewed.

## 2025-02-18 NOTE — ASSESSMENT & PLAN NOTE
The patient does not seem to be anemic at all.  In fact her hemoglobin and hematocrit are slightly above the high normal range.  The etiology is most likely reactive.  Will continue to monitor her closely.  I did ask her to follow-up with her neurology team regarding her neurological symptoms.    Orders:    CBC and differential; Future    Comprehensive metabolic panel; Future    Ferritin; Future    Iron Panel (Includes Ferritin, Iron Sat%, Iron, and TIBC); Future    Magnesium; Future    Vitamin B12; Future

## 2025-02-18 NOTE — ASSESSMENT & PLAN NOTE
She seems to have borderline low normal vitamin B12 level.  I did ask her to consider taking vitamin B12 supplements frequently.  Orders:    CBC and differential; Future    Comprehensive metabolic panel; Future    Ferritin; Future    Iron Panel (Includes Ferritin, Iron Sat%, Iron, and TIBC); Future    Magnesium; Future    Vitamin B12; Future     Dr. Ingram

## 2025-02-28 ENCOUNTER — OFFICE VISIT (OUTPATIENT)
Dept: NEUROLOGY | Facility: CLINIC | Age: 50
End: 2025-02-28
Payer: MEDICARE

## 2025-02-28 VITALS
SYSTOLIC BLOOD PRESSURE: 108 MMHG | DIASTOLIC BLOOD PRESSURE: 76 MMHG | OXYGEN SATURATION: 99 % | HEART RATE: 60 BPM | HEIGHT: 63 IN | BODY MASS INDEX: 28.74 KG/M2 | WEIGHT: 162.2 LBS | TEMPERATURE: 98 F | RESPIRATION RATE: 18 BRPM

## 2025-02-28 DIAGNOSIS — Z98.84 BARIATRIC SURGERY STATUS: ICD-10-CM

## 2025-02-28 DIAGNOSIS — Z98.2 S/P VP SHUNT: ICD-10-CM

## 2025-02-28 DIAGNOSIS — G93.2 PSEUDOTUMOR CEREBRI: ICD-10-CM

## 2025-02-28 DIAGNOSIS — G43.109 MIGRAINE WITH AURA AND WITHOUT STATUS MIGRAINOSUS, NOT INTRACTABLE: Primary | ICD-10-CM

## 2025-02-28 DIAGNOSIS — R29.818 SUSPECTED SLEEP APNEA: ICD-10-CM

## 2025-02-28 DIAGNOSIS — N20.0 RENAL CALCULI: ICD-10-CM

## 2025-02-28 PROCEDURE — 99215 OFFICE O/P EST HI 40 MIN: CPT | Performed by: PHYSICIAN ASSISTANT

## 2025-02-28 RX ORDER — BUTALBITAL, ACETAMINOPHEN AND CAFFEINE 50; 325; 40 MG/1; MG/1; MG/1
1 TABLET ORAL EVERY 6 HOURS PRN
Qty: 15 TABLET | Refills: 4 | Status: SHIPPED | OUTPATIENT
Start: 2025-02-28

## 2025-02-28 RX ORDER — PHENOBARBITAL 15 MG/1
15 TABLET ORAL 2 TIMES DAILY
Qty: 60 TABLET | Refills: 1 | Status: SHIPPED | OUTPATIENT
Start: 2025-02-28

## 2025-02-28 NOTE — ASSESSMENT & PLAN NOTE
Preventive:  Continue all your vitamins  Vyepti 300 mg every 12 weeks  Start phenobarbital 15 mg twice a day for at least 7-10 days then update as to how feeling.  Can increase if still no improvement    Abortive:   At onset of worsening headache take Nurtec 75 mg.  If needed use Fioricet but only 10 month        Orders:  •  butalbital-acetaminophen-caffeine (FIORICET,ESGIC) -40 mg per tablet; Take 1 tablet by mouth every 6 (six) hours as needed for headaches

## 2025-02-28 NOTE — PROGRESS NOTES
Name: Shweta Nolan      : 1975      MRN: [id number]  Encounter Provider: Nelida Villanueva PA-C  Encounter Date: 2025   Encounter department: [facility] [place]  :  Assessment & Plan  Migraine with aura and without status migrainosus, not intractable   Preventive:  Continue all your vitamins  Vyepti 300 mg every 12 weeks  Start phenobarbital 15 mg twice a day for at least 7-10 days then update as to how feeling.  Can increase if still no improvement    Abortive:   At onset of worsening headache take Nurtec 75 mg.  If needed use Fioricet but only 10 month        Orders:  •  butalbital-acetaminophen-caffeine (FIORICET,ESGIC) -40 mg per tablet; Take 1 tablet by mouth every 6 (six) hours as needed for headaches    S/P  shunt  Has no current evidence of papilledema per patient.  Continue to follow up with neurosurgery        Pseudotumor cerebri   Preventive:  Continue all your vitamins  Vyepti 300 mg every 3 months.     Abortive:   Take Nurtec 75 mg at onset of worsening headache  If needed use Fioricet but only 10 month    Orders:  •  PHENobarbital 15 mg tablet; Take 1 tablet (15 mg total) by mouth 2 (two) times a day  •  CBC (Includes Diff/Plt) (Refl); Future  •  Comprehensive metabolic panel; Future  •  Phenobarbital level; Future    Suspected sleep apnea  Patient will call to schedule consultation with sleep medicine provider       Bariatric surgery status  Complicated medical and surgical course.  No NSAIDs or long acting medications       Renal calculi  Unable to use topamax             History of Present Illness   HPI   Shweta Nolan is a 49 y.o. Female who presents for pseudotumor cerebri s/p shunt    Sleep medicine-- as of 2025 has not sleep      Past medical history:  Went to [place] for hiatal hernia and was in ICU for a long time.  Was septic after her surgeries and had her  shunt removed 2018 due to concerns of bacterial infection spreading through the  "shunt     Patient complains of pain in the right side of head like had been hit.  Also has \"zingers\" 1-2 a week and lasts a minute to 30 minutes.  Sometimes has to lay down for the rest of the day.  and hears a vibration noise for 2-3 week.  Sometimes is very loud and annoying.  This pain began in mid November.  Pain is the same as before.  Pain is similar to prior to shunt replacement.  Noise is only new symptoms.     Idiopathic intracranial hypertension:  She is s/p lumboperitoneal shunt placement for increased intracranial pressure, performed at [place] in 2015 and removal of the shunt and placement of a  shunt in May of 2017. She is also status post gastric bypass surgery and has lost 60lbs.      She saw her ophthalmologist 3/2020 and there was no optic nerve swelling.     Patient reports she has daily pressure and pain. Has good days and bad days but is always has pressure.  States that had eye exam approx 6 months ago and was fine.  Is scheduled to 4/14/2023 for another exam     What medications do you take or have you taken for your headaches?   Current Preventative:  Vitamin-C vitamin-D folic acid multivitamin vitamin B12  Vyepti 300mg     Current abortive:  Nurtec  Fioricet or medical marijuana     Prior PREVENTIVE:  nortriptyline (this helped her headaches),  Protriptyline  Gabapentin (off balance), Depakote, lamictal  Diamox (stopped by provider because of her history of kidney stones), Methazolamide   Verapamil (fatigue),  Emgality (3 doses, didn't notice a difference)     Prior ABORTIVE:   Tramadol (stopped on her own),   Toradol (stopped on her own), Indomethacin     Non-Medical/Alternative Treatments used in the past for headaches: Has tried Massage (doesn’t help headaches), Chiropractic adjustment (doesn’t help with headaches), Acupuncture (doesn’t help with headaches)     What is your current pain level - 9/10,     How often do the headaches occur -   Baseline pain is : daily is " "2-3/10  Increase pain: does have variable days of increasing pain.  Pain can increase for 30 minutes or last all day every couple of days but has been worsening every day for 2+weeks  Can have 10/10 pain once a week (when weather is bad)  She is also getting the sensation of light headed and pain in the frontal region with bending over or getting up from sitting potision      What time of the day do the headaches start -   Baseline pain: there all the time  Increase pain: anytime of the day     How long do the headaches last -   Baseline pain is : there all the time  Increase pain: few minutes to rest of the day  She is also getting the sensation of light headed and pain in the frontal region with bending over or getting up from sitting poistion-this is also variable, usually happens 1-2 times a week.  Lasts only a minute or 2     Are you ever headache free - no always has pressure in her head     Where are they located -   Baseline pain: entire head  Increase pain: one temple to the other or frontal to the back of her head  Position change: frontal only     What is the intensity of pain -   Baseline pain: 4-5/10  Intense pain: 10/10  Position change pain: 9/10     Any warning prior to headache and how long do they last - N/A, they're constant     Describe your usual headache -   Baseline pain: Pressure, \"like my brain is going to explode out of my skull\"  Increase pain: zap, zinger and it can be sharp (pt has difficulty describing the sensation)  Position change: sharp pressure     Associated symptoms:   -       Problem with concentration  -       Blurred vision occasionally  -       Dizziness when extending or flexing neck to look up or down  -       prefer to be alone and in a dark room, unable to work     Headache are worse if the patient: cough, sneeze, bending over, moving bowel, running or exercising,   Headache trigger: N/A, they're constant     Have you used CBD or THC for your headaches and how often? Yes, " "has medical card  Are you current pregnant or planning on getting pregnant? No  Have you ever had any Brain imaging? yes  I personally reviewed these images.        MRAs of the head and neck without contrast done on 3/29/17 are both unremarkable.     Brain MRI without contrast on 3/27/17 shows ventricles in the lower limits of normal in size consistent with pseudotumor cerebri, as well as scattered parenchymal WM changes which are nonspecific.     Head CT 7/17: No acute intracranial abnormality.     LP @ [facility] April/ 2017: OP 20, CP14     Reviewed old notes from physician seen in the past- see above HPI for summary of previous encounters.   Review of Systems I have personally reviewed the MA's review of systems and made changes as necessary.         Objective   /76 (BP Location: Left arm, Patient Position: Sitting, Cuff Size: Standard)   Pulse 60   Temp 98 °F (36.7 °C) (Temporal)   Resp 18   Ht 5' 3\" (1.6 m)   Wt 73.6 kg (162 lb 3.2 oz)   LMP  (LMP Unknown)   SpO2 99%   BMI 28.73 kg/m²     Physical Exam  Neurological Exam  CONSTITUTIONAL: Well developed, well nourished, well groomed. No dysmorphic features. Appears uncomfortable but in no acute distress     HEENT:  Normocephalic atraumatic.    Chest:  Respirations regular and unlabored.    Psychiatric:  Normal behavior and appropriate affect      MENTAL STATUS  Orientation: Alert and oriented x 3  Fund of knowledge: Intact.        Administrative Statements   I have spent a total time of 65 minutes in caring for this patient on the day of the visit/encounter including Diagnostic results, Prognosis, Risks and benefits of tx options, Instructions for management, Patient and family education, Risk factor reductions, Impressions, Counseling / Coordination of care, Documenting in the medical record, Reviewing/placing orders in the medical record (including tests, medications, and/or procedures), and Obtaining or reviewing history  .  "

## 2025-02-28 NOTE — PATIENT INSTRUCTIONS
Look into cefaly device and the nerivio    1. Migraine with aura and without status migrainosus, not intractable  Assessment & Plan:   Preventive:  Continue all your vitamins  Vyepti 300 mg every 12 weeks  Start phenobarbital 15 mg twice a day for at least 7-10 days then update as to how feeling.  Can increase if still no improvement    Abortive:   At onset of worsening headache take Nurtec 75 mg.  If needed use Fioricet but only 10 month        Orders:  -     butalbital-acetaminophen-caffeine (FIORICET,ESGIC) -40 mg per tablet; Take 1 tablet by mouth every 6 (six) hours as needed for headaches  2. S/P  shunt  3. Pseudotumor cerebri  Assessment & Plan:   Preventive:  Continue all your vitamins  Vyepti 300 mg every 3 months.     Abortive:   Take Nurtec 75 mg at onset of worsening headache  If needed use Fioricet but only 10 month    For next 5 days take Decadron 2 mg in am    Diamox 250 mg every 8 hours for the next 5-7 days  XR shunt series and CT head  Orders:  -     PHENobarbital 15 mg tablet; Take 1 tablet (15 mg total) by mouth 2 (two) times a day  -     CBC (Includes Diff/Plt) (Refl); Future  -     Comprehensive metabolic panel; Future  -     Phenobarbital level; Future

## 2025-02-28 NOTE — ASSESSMENT & PLAN NOTE
Preventive:  Continue all your vitamins  Vyepti 300 mg every 3 months.     Abortive:   Take Nurtec 75 mg at onset of worsening headache  If needed use Fioricet but only 10 month    Orders:  •  PHENobarbital 15 mg tablet; Take 1 tablet (15 mg total) by mouth 2 (two) times a day  •  CBC (Includes Diff/Plt) (Refl); Future  •  Comprehensive metabolic panel; Future  •  Phenobarbital level; Future

## 2025-03-04 ENCOUNTER — HOSPITAL ENCOUNTER (OUTPATIENT)
Dept: INFUSION CENTER | Facility: HOSPITAL | Age: 50
Discharge: HOME | End: 2025-03-04
Payer: MEDICARE

## 2025-03-04 VITALS
SYSTOLIC BLOOD PRESSURE: 133 MMHG | HEART RATE: 56 BPM | DIASTOLIC BLOOD PRESSURE: 72 MMHG | OXYGEN SATURATION: 98 % | TEMPERATURE: 97.5 F | RESPIRATION RATE: 16 BRPM

## 2025-03-04 DIAGNOSIS — G43.109 MIGRAINE WITH AURA AND WITHOUT STATUS MIGRAINOSUS, NOT INTRACTABLE: Primary | ICD-10-CM

## 2025-03-04 PROCEDURE — 96365 THER/PROPH/DIAG IV INF INIT: CPT

## 2025-03-04 RX ORDER — SODIUM CHLORIDE 9 MG/ML
20 INJECTION, SOLUTION INTRAVENOUS ONCE
OUTPATIENT
Start: 2025-05-27

## 2025-03-04 RX ORDER — SODIUM CHLORIDE 9 MG/ML
20 INJECTION, SOLUTION INTRAVENOUS ONCE
Status: COMPLETED | OUTPATIENT
Start: 2025-03-04 | End: 2025-03-04

## 2025-03-04 RX ADMIN — EPTINEZUMAB-JJMR 300 MG: 100 INJECTION INTRAVENOUS at 14:39

## 2025-03-04 RX ADMIN — SODIUM CHLORIDE 20 ML/HR: 0.9 INJECTION, SOLUTION INTRAVENOUS at 14:36

## 2025-03-04 NOTE — PROGRESS NOTES
Shweta Nolan  tolerated Vyepti infusion well with no complications. Pt denies any recent abx use or s/s of infection/illness.    Shweta Nolan is aware of future appt on 5/27 at 2PM.     AVS declined by Shweta Nolan.

## 2025-03-06 RX ORDER — ESTRADIOL 0.05 MG/D
1 PATCH, EXTENDED RELEASE TRANSDERMAL 2 TIMES WEEKLY
COMMUNITY
Start: 2025-02-18

## 2025-03-07 ENCOUNTER — TELEPHONE (OUTPATIENT)
Dept: NEUROLOGY | Facility: CLINIC | Age: 50
End: 2025-03-07

## 2025-03-07 ENCOUNTER — OFFICE VISIT (OUTPATIENT)
Dept: INTERNAL MEDICINE CLINIC | Age: 50
End: 2025-03-07
Payer: MEDICARE

## 2025-03-07 VITALS
SYSTOLIC BLOOD PRESSURE: 126 MMHG | OXYGEN SATURATION: 99 % | HEIGHT: 65 IN | WEIGHT: 162 LBS | DIASTOLIC BLOOD PRESSURE: 80 MMHG | HEART RATE: 57 BPM | BODY MASS INDEX: 26.99 KG/M2 | TEMPERATURE: 98.8 F

## 2025-03-07 DIAGNOSIS — R63.5 WEIGHT GAIN: Primary | ICD-10-CM

## 2025-03-07 DIAGNOSIS — Z12.31 ENCOUNTER FOR SCREENING MAMMOGRAM FOR BREAST CANCER: ICD-10-CM

## 2025-03-07 DIAGNOSIS — Z00.00 ANNUAL PHYSICAL EXAM: Primary | ICD-10-CM

## 2025-03-07 DIAGNOSIS — Z12.11 ENCOUNTER FOR SCREENING FOR MALIGNANT NEOPLASM OF COLON: ICD-10-CM

## 2025-03-07 DIAGNOSIS — Z12.11 SCREENING FOR COLON CANCER: ICD-10-CM

## 2025-03-07 PROCEDURE — 99396 PREV VISIT EST AGE 40-64: CPT | Performed by: FAMILY MEDICINE

## 2025-03-07 NOTE — TELEPHONE ENCOUNTER
Reason for call:   [x] Prior Auth  [] Other:     Caller:  [] Patient  [x] Pharmacy  Name:   Address:   Callback Number:     Ordering Provider:   [] PCP/Provider -   [x] Speciality/Provider - [facility]     Has the patient tried other medications and failed? If failed, which medications did they fail?    [] No   [] Yes -     Is the patient's insurance updated in EPIC?   [x] Yes   [] No     Is a copy of the patient's insurance scanned in EPIC?   [x] Yes   [] No

## 2025-03-07 NOTE — PROGRESS NOTES
Assessment/Plan:    1. Annual physical exam  2. Encounter for screening mammogram for breast cancer  -     Mammo screening bilateral w 3d and cad; Future; Expected date: 04/12/2025  3. Encounter for screening for malignant neoplasm of colon  -     Ambulatory Referral to Gastroenterology; Future  4. Screening for colon cancer  -     Lipid Panel with Direct LDL reflex; Future; Expected date: 08/04/2025  Patient to address her weight gain with bariatrics later this month        There are no Patient Instructions on file for this visit.    Return for Annual physical.    Subjective:      Patient ID: Shweta Nolan is a 49 y.o. female.    Chief Complaint   Patient presents with    Physical Exam     Annual physical exam.  Due for mammogram and colonoscopy       HPI      Adult Annual Physical   Patient here for a comprehensive physical exam. The patient reports no problems.    Diet and Physical Activity  Diet/Nutrition: well balanced diet.   Exercise: moderate cardiovascular exercise.      Depression Screening  PHQ-9 Depression Screening    PHQ-9:    Frequency of the following problems over the past two weeks:            General Health  Sleep: gets 4-6 hours of sleep on average.   Hearing: normal - bilateral.  Vision: no vision problems.   Dental: regular dental visits.       /GYN Health  Patient is: postmenopausal  Last menstrual period:  hysterectomy partial 2012  Contraceptive method: menopause  Mammo gram due April 2025   Hagerman never- due  Vaccine - no     The following portions of the patient's history were reviewed and updated as appropriate: allergies, current medications, past family history, past medical history, past social history, past surgical history and problem list.    Review of Systems      Constitutional:  Denies fever or chills , weight gain since last visit  Eyes:  Denies double , blurry vision or eye pain  HENT:  Denies nasal congestion, sore throat or new hearing issues  Respiratory:  Denies cough or  "shortness of breath or wheezing  Cardiovascular:  Denies palpitations or chest pain  GI:  Denies abdominal pain, nausea, or vomiting, no loose stools, no reflux  Integument:  Denies rash , no open areas  Neurologic:  Denies headache or focal weakness, no dizziness  : no dysuria, or hematuria      Current Outpatient Medications   Medication Sig Dispense Refill    atorvastatin (LIPITOR) 10 mg tablet take 1 tablet by mouth at bedtime 90 tablet 1    butalbital-acetaminophen-caffeine (FIORICET,ESGIC) -40 mg per tablet Take 1 tablet by mouth every 6 (six) hours as needed for headaches 15 tablet 4    dexlansoprazole (DEXILANT) 60 MG capsule Take 1 capsule (60 mg total) by mouth daily 90 capsule 3    estradiol (VIVELLE-DOT) 0.05 MG/24HR Place 1 patch on the skin 2 (two) times a week      methazolamide (NEPTAZANE) 25 MG tablet take 1 tablet by mouth three times a day 90 tablet 2    Multiple Vitamin (MULTIVITAMIN) tablet Take 1 tablet by mouth daily Wears a patch      PHENobarbital 15 mg tablet Take 1 tablet (15 mg total) by mouth 2 (two) times a day 60 tablet 1    rimegepant sulfate (Nurtec) 75 mg TBDP Take 1 tablet (75 mg total) by mouth daily as needed (migraine) 16 tablet 11    fezolinetant (Veozah) tablet Take 1 tablet (45 mg total) by mouth daily 30 tablet 0    meclizine (ANTIVERT) 25 mg tablet Take 1 tablet (25 mg total) by mouth 3 (three) times a day as needed for dizziness for up to 7 days 21 tablet 0    naloxone (NARCAN) 4 mg/0.1 mL nasal spray Administer 1 spray into a nostril. If no response after 2-3 minutes, give another dose in the other nostril using a new spray. 1 each 1    Nutritional Supplements (Menopause Formula) TABS Take by mouth 2 gummies daily       No current facility-administered medications for this visit.       Objective:    /80 (BP Location: Left arm, Patient Position: Sitting, Cuff Size: Standard)   Pulse 57   Temp 98.8 °F (37.1 °C) (Temporal)   Ht 5' 4.5\" (1.638 m)   Wt 73.5 " kg (162 lb)   LMP  (LMP Unknown)   SpO2 99%   BMI 27.38 kg/m²        Physical Exam      Constitutional:  Well developed, well nourished, no acute distress, non-toxic appearance   Eyes:  PERRL, conjunctiva normal , non icteric sclera  HENT:  Atraumatic, oropharynx moist. Neck-  supple   Respiratory:  CTA b/l, normal breath sounds, no rales, no wheezing   Cardiovascular:  RRR, no murmurs, no LE edema b/l  GI:  Soft, nondistended, normal bowel sounds x 4, nontender, no organomegaly, no mass, no rebound, no guarding   Neurologic:  no focal deficits noted   Psychiatric:  Speech and behavior appropriate , AAO x 3      Randal Boss DO

## 2025-03-11 NOTE — TELEPHONE ENCOUNTER
Called [facility] at [phone] and spoke to lulu.    Made her aware of below.    She submitted this info to be reviewed again.      Will await determination

## 2025-03-11 NOTE — TELEPHONE ENCOUNTER
Can you appeal as added phenobarbital and ramping to see if helps decrease her headaches and no longer use the fioricet if effective.  But until ramp up taper is deemed effective (or ineffective) needs to have her rescue medication

## 2025-03-11 NOTE — TELEPHONE ENCOUNTER
Butalbital/APAP/caff denied. Per denial letter-      you recently ordered phenobarbital for pt.     Please advise

## 2025-03-13 NOTE — TELEPHONE ENCOUNTER
Please let patient know that was denied.  Also see how she is doing on the phenobarbital and if we need to increase the dose faster.

## 2025-03-13 NOTE — TELEPHONE ENCOUNTER
Called and advised pt of all of the below. She verbalized understanding. Pt states that she is still getting some sensation. She's only been taking phenobarbital 15 mg bid for 2 weeks. She wants to take same dose for a month and see how it goes.  Pt states that she will pay out of pocket for the fioricet. Advised that she can use good rx. Pt verbalized understanding.     elayne

## 2025-03-25 ENCOUNTER — OFFICE VISIT (OUTPATIENT)
Dept: BARIATRICS | Facility: CLINIC | Age: 50
End: 2025-03-25
Payer: MEDICARE

## 2025-03-25 VITALS
TEMPERATURE: 97.8 F | WEIGHT: 163.5 LBS | DIASTOLIC BLOOD PRESSURE: 80 MMHG | HEIGHT: 63 IN | SYSTOLIC BLOOD PRESSURE: 120 MMHG | HEART RATE: 63 BPM | BODY MASS INDEX: 28.97 KG/M2

## 2025-03-25 DIAGNOSIS — E66.3 OVERWEIGHT: ICD-10-CM

## 2025-03-25 DIAGNOSIS — Z48.815 ENCOUNTER FOR SURGICAL AFTERCARE FOLLOWING SURGERY OF DIGESTIVE SYSTEM: Primary | ICD-10-CM

## 2025-03-25 DIAGNOSIS — G93.2 PSEUDOTUMOR CEREBRI: ICD-10-CM

## 2025-03-25 DIAGNOSIS — Z98.84 BARIATRIC SURGERY STATUS: ICD-10-CM

## 2025-03-25 PROCEDURE — 99214 OFFICE O/P EST MOD 30 MIN: CPT | Performed by: NURSE PRACTITIONER

## 2025-03-25 RX ORDER — TIRZEPATIDE 2.5 MG/.5ML
2.5 INJECTION, SOLUTION SUBCUTANEOUS WEEKLY
Qty: 2 ML | Refills: 0 | Status: SHIPPED | OUTPATIENT
Start: 2025-03-25 | End: 2025-04-22

## 2025-03-25 NOTE — PROGRESS NOTES
Date of surgery: 2016  Procedure: Sleeve  Performing surgeon: Dr. Ward     Initial Weight - 225lb  Current Weight -163.5 lb  Keron Weight - 135 lb  Total Body Weight Loss (EWL)- 30.4%  EWL% - 377%  TWB % -23%

## 2025-03-25 NOTE — PROGRESS NOTES
Assessment/Plan:    Overweight/BMI 28  -Discussed role of weight loss medications.  -Initial weight loss goal of 5-10% weight loss for improved overall health  -Reviewed Screening labs - has orders - encouraged to have done.   -patient has maintained/performed healthy dietary changes and increased physical activity for at least 6 months prior to initiation of AOMs.   -Patient is interested in pursuing Zepbound  Discussed medication options  Recommend treatment with Zepbound  Medication Contract Signed   Discussed expected weight loss of approximately 20% along with lifestyle modifications  Discussed risks/side effects of medication and demonstrated pen device  Recommend small/low fat meals and stop eating when full to avoid side effects.  Discussed importance of adequate protein intake and strength training to reduce risk of muscle loss.   Discussed need to stop medication for at least 1 week prior to planned surgery/endoscopy  Denies hx Pancreatitis or FH of Medullary cell Thyroid CA/MEN2 syndrome  Start Zepbound  2.5mg weekly x 4 weeks and titrate  Advised to contact office in 2 weeks with update regarding tolerability and at that time will increase to 5mg dose if tolerating medication with no significant side effects.        Follow up in approximately 3 months with Surgical Advanced Practitioner.  Goals:  Food log (ie.) [email or web address],"[email or web address][email or web address],etc. baritastic  No sugary beverages. At least 64oz of water daily.  Increase physical activity by 10 minutes daily. Gradually increase physical activity to a goal of 5 days per week for 30 minutes of MODERATE intensity PLUS 2 days per week of FULL BODY resistance training  5-10 servings of fruits and vegetables per day and 25-35 grams of dietary fiber per day, gradually increasing  6320-7200 calories per day  No sugary beverages. At least 64oz of water daily., Increase physical activity by 10 minutes daily, Practice lesson plans 1-6  in bariatric manual , Practice 30/60 rule, Gradually increase physical activity to a goal of 5 days per week for 30 minutes of MODERATE intensity PLUS 2 days per week of FULL BODY resistance training, Continue with dietary and behavioral recommendations outlined in bariatric manual, Continue to take recommended bariatric vitamins as directed, Goal protein intake of 60-80 grams per day, 5-10 servings of fruits and vegetables per day, and 25-35 grams of dietary fiber per day    Pseudotumor cerebri - more controlled with weight loss especially after her bariatric surgery however since her weight gain, she has noted to have worsening migraines and headaches. Now experiencing daily. She is concern this is worsening her intracranial pressure that is causing her migraines to occur more often from weight gain. Anticipate improvement with further weight loss.       Bariatric surgery status - follows with our bariatric team routinely. Follow up as scheduled in Sept for surgical annual.     Diagnoses and all orders for this visit:    Encounter for surgical aftercare following surgery of digestive system    Bariatric surgery status    Overweight  -     tirzepatide (Zepbound) 2.5 mg/0.5 mL auto-injector; Inject 0.5 mL (2.5 mg total) under the skin once a week for 28 days    BMI 28.0-28.9,adult  -     tirzepatide (Zepbound) 2.5 mg/0.5 mL auto-injector; Inject 0.5 mL (2.5 mg total) under the skin once a week for 28 days    Pseudotumor cerebri  -     tirzepatide (Zepbound) 2.5 mg/0.5 mL auto-injector; Inject 0.5 mL (2.5 mg total) under the skin once a week for 28 days        Subjective:   Chief Complaint   Patient presents with    Follow-up     Post-op  Support meds      Patient ID: Shweta Nolan  is a 49 y.o. female with excess weight/obesity here to pursue medical weight management. -s/p Vertical Sleeve Gastrectomy with Dr. 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 Had EGD with Stretta with Dr. Sha Gonzalez on 02/05/2020.  Here for MWM consult due to inability to lose weight.   Past Medical History:   Diagnosis Date    Abdominal pain     Acute cystitis with hematuria 01/03/2020    Brain condition     Pseudotumor Cerebri     Chronic kidney disease     COVID-19 virus infection 11/10/2022    De Quervain's disease (tenosynovitis)     Esophageal perforation     Gastric leak     GERD (gastroesophageal reflux disease)     Headache     Idiopathic intracranial hypertension     Kidney stone     Moderate protein-calorie malnutrition (HCC) 02/21/2018    No blood products     per pt: personal and Zoroastrian beliefs. surgeon office aware 12/13/19    Papilledema, both eyes     PONV (postoperative nausea and vomiting)     Postgastrectomy malabsorption     Presence of lumboperitoneal shunt     Resolved: Sep 20, 2017    Rotator cuff tendinitis     Resolved: Aug 23, 2017    Visual field defect        HPI:  Obesity/Excess Weight:   BMI 28.96  Severity: Mild  Onset:  in the past 6-9 months; worsened once she turned 48 y/o.     Modifiers: Diet and Exercise and bariatric surgery  Contributing factors: Stress/Emotional Eating, Menopause, Lack of knowledge of appropriate lifestyle changes, Depression, and Insufficient time to make appropriate lifestyle changes  Associated symptoms: fatigue, increased joint pain, decreased exercise capacity, body image issues, decreased self esteem, increased shortness of breath, decreased mobility, depression, inability to do certain activities, clothes do not fit, and migraines/headaches secondary to worsening pseudotumor.   Colonoscopy-Not Completed    Highest weight 213 lbs (prior to sleeve); 137 lbs (prior to stretta)   Current Weight 163.5 lbs  Keron 80-85 lbs  Goal - 130s  5% weight loss - 8.2 lbs (155.3 lbs)  20% weight loss - 32.7 lbs (130.8 lbs)       Hydration: Follows 30/60 minute rule.   Alcohol:   Exercise: was going to the gym 5 times per week but currently on hold due to her worsening migraines   Dining out:  Once every 2 weeks - watches what she tends to eat  Occupation:   Sleep: 5 -6 hrs of sleep  STOPBAN/8  Contraception: menopausal    B: peppermint tea with stevia, protein waffle  S: At times banana or slim rafa   L: Apples and cheese  S: maybe grapes  D: protein, veggies or starch  S: popcorn      Patient is not pregnant/breastfeeding (metformin only)    Has a fhx of thyroid cancer - brother; but was PAPILLARY THYROID CA. Patient denies personal and family history of  pancreatitis and MEN-2 tumors.     + hx of kidney stones - Denies any hx of glaucoma, seizures, gallstones. (Contraindicated for topamax)    Denies Hx of CAD, PAD, palpitations, arrhythmia, uncontrolled HTN, hyperthyroidism or use of stimulant medications     Has underlying depression/anxiety. Would avoid phentermine. Denies suicidal behavior or thinking , insomnia or sleep disturbance.  - can consider wellbutrin    Denies chronic opioid use. - can consider naltrexone or contrave.       The following portions of the patient's history were reviewed and updated as appropriate: allergies, current medications, past family history, past medical history, past social history, past surgical history, and problem list.    Past Medical History:   Diagnosis Date    Abdominal pain     Acute cystitis with hematuria 2020    Brain condition     Pseudotumor Cerebri     Chronic kidney disease     COVID-19 virus infection 11/10/2022    De Quervain's disease (tenosynovitis)     Esophageal perforation     Gastric leak     GERD (gastroesophageal reflux disease)     Headache     Idiopathic intracranial hypertension     Kidney stone     Moderate protein-calorie malnutrition (HCC) 2018    No blood products     per pt: personal and Lutheran beliefs. surgeon office aware 19     Papilledema, both eyes     PONV (postoperative nausea and vomiting)     Postgastrectomy malabsorption     Presence of lumboperitoneal shunt     Resolved: Sep 20, 2017    Rotator cuff tendinitis     Resolved: Aug 23, 2017    Visual field defect      Past Surgical History:   Procedure Laterality Date    BONE EXOSTOSIS EXCISION Right 11/16/2023    Procedure: EXCISION EXOSTOSIS 5TH TOE RIght;  Surgeon: Sri Herring DPM;  Location: [facility];  Service: Podiatry    BONE EXOSTOSIS EXCISION Right 2/29/2024    Procedure: EXCISION EXOSTOSIS 5TH TOE;  Surgeon: Sri Herring DPM;  Location: [facility];  Service: Podiatry    BREAST BIOPSY Left 1993    benign    CSF SHUNT      LP shunt - 2015 -  shunt - 2017    ESOPHAGOGASTRODUODENOSCOPY N/A 02/23/2018    Procedure: ESOPHAGOGASTRODUODENOSCOPY (EGD) WITH ESOPHAGEAL STENT PLACEMENT;  Surgeon: Sergio Chung MD;  [facility]: [facility];  Service: Thoracic    ESOPHAGOGASTRODUODENOSCOPY N/A 02/23/2018    Procedure: ESOPHAGOGASTRODUODENOSCOPY (EGD) WITH REMOVAL ESOPHAGEAL STENT  AND REPLACEMENT WITH 23mm X155mm WALLFLEX ESOPHAGEAL STENT;  Surgeon: Sergio Chung MD;  [facility]: [facility];  Service: Thoracic    ESOPHAGOGASTRODUODENOSCOPY N/A 03/28/2018    Procedure: ESOPHAGOGASTRODUODENOSCOPY (EGD) with PEJ placement.;  Surgeon: Andrea Gonzales MD;  [facility]: [facility];  Service: Gastroenterology    ESOPHAGOGASTRODUODENOSCOPY N/A 04/05/2018    Procedure: ESOPHAGOGASTRODUODENOSCOPY (EGD) with botox injection and kaofed placement;  Surgeon: Naomie Leroy DO;  [facility];  Service: Gastroenterology    ESOPHAGOGASTRODUODENOSCOPY N/A 04/10/2018    Procedure: ESOPHAGOGASTRODUODENOSCOPY (EGD) with Kaofed placement;  Surgeon: Saroj Kirkland MD;  [facility];  Service: Gastroenterology    ESOPHAGOSCOPY WITH STENT INSERTION N/A 01/24/2018    Procedure: INSERTION STENT ESOPHAGEAL;  Surgeon: Gonsalo Yang MD;  [facility];  Service: Gastroenterology    EYE  "SURGERY Bilateral 1980    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)     FL LUMBAR PUNCTURE DIAGNOSTIC  4/10/2017    FL RETROGRADE PYELOGRAM  06/24/2020    FL RETROGRADE PYELOGRAM  08/21/2021    FL RETROGRADE PYELOGRAM  5/4/2023    FL RETROGRADE PYELOGRAM  5/18/2023    GASTRIC BYPASS  12/19/2016    Lap sleeve gastrectomy w/shunt length shortening procedure    HIATAL HERNIA REPAIR      HYSTERECTOMY  03/14/2013    IR LUMBAR PUNCTURE  08/29/2018    LAPAROTOMY N/A 01/25/2018    Procedure: Exploratory Laparotomy, wash out,placement of drains, placement of NG feeding tube ;  Surgeon: Ruperto Jasso DO;  [facility];  Service: General    LUMBAR PERITONEAL SHUNT  11/19/2015    Laparoscopic assisted    NJ BREAST REDUCTION Bilateral 03/25/2022    Procedure: BILATERAL BREAST REDUCTION;  Surgeon: Indio Williamson MD;  [facility];  Service: Plastics    NJ CRTJ SHUNT [id number] Right 12/18/2019    Procedure: IMAGE GUIDED INSERTION OF RIGHT CORONAL VENTRICULAR-ATRIAL SHUNT;  Surgeon: Edouard Pires MD;  [facility];  Service: Neurosurgery    NJ CRTJ SHUNT [id number] TERMINUS Right 05/31/2017    Procedure: IMAGE GUIDED CORONAL PLACEMENT OF PROGRAMABLE VENTRICULAR-PERITONEAL SHUNT, REMOVAL OF LP SHUNT ;  Surgeon: Edouard Pires MD;  [facility];  Service: Neurosurgery    NJ CYSTO/URETERO W/LITHOTRIPSY &INDWELL STENT INSRT Bilateral 06/24/2020    Procedure: CYSTOSCOPY URETEROSCOPY WITH LITHOTRIPSY HOLMIUM LASER, RETROGRADE PYELOGRAM AND exchange bilateral  STENTs URETERAL;  Surgeon: Mark Godfrey MD;  [facility];  Service: Urology    NJ CYSTO/URETERO W/LITHOTRIPSY &INDWELL STENT INSRT Left 08/21/2021    Procedure: CYSTOSCOPY; LEFT URETEROSCOPY WITH RETROGRADE PYELOGRAM, REMOVAL OF STONE AND INSERTION LEFT URETERAL STENT;  Surgeon: Jamie Nuñez MD;  [facility];  Service: Urology    NJ CYSTO/URETERO W/LITHOTRIPSY &INDWELL STENT INSRT " N/A 5/18/2023    Procedure: CYSTOSCOPY URETEROSCOPY WITH LITHOTRIPSY HOLMIUM LASER, RETROGRADE PYELOGRAM, REMOVAL OF BILATERAL STENTS,  AND INSERTION STENT URETERAL LEFT ,BASKET EXTRACTION OF STONE LEFT SIDE;  Surgeon: Jamie Nuñez MD;  [facility];  Service: Urology    NV CYSTOURETHROSCOPY W/URETERAL CATHETERIZATION N/A 06/06/2020    Procedure: Bilateral CYSTOSCOPY RETROGRADE PYELOGRAM WITH INSERTION STENT URETERAL;  Surgeon: Moe Mcgrath MD;  Location: [facility];  Service: Urology    NV CYSTOURETHROSCOPY W/URETERAL CATHETERIZATION Bilateral 5/4/2023    Procedure: CYSTOSCOPY RETROGRADE PYELOGRAM WITH INSERTION STENT URETERAL;  Surgeon: Moe Mcgrath MD;  [facility]: [facility];  Service: Urology    NV EGD TRANSORAL BIOPSY SINGLE/MULTIPLE N/A 06/27/2018    Procedure: ESOPHAGOGASTRODUODENOSCOPY (EGD) with padlock clip placement;  Surgeon: Lisbet Gonzalez MD;  [facility]: [facility];  Service: Bariatrics    NV RMVL COMPL CSF SHUNT SYSTEM W/O RPLCMT SHUNT Right 02/05/2018    Procedure: Removal of  shunt;  Surgeon: Edouard Pires MD;  [facility];  Service: Neurosurgery    NV RPLCMT/REVJ CSF SHUNT VALVE/CATH SHUNT SYS Right 01/25/2018    Procedure: Externalization of right-sided SHUNT VENTRICULAR-PERITONEAL in anterior chest wall ribs two and three level  ;  Surgeon: Miquel Duarte MD;  [facility];  Service: Neurosurgery    REDUCTION MAMMAPLASTY Bilateral     WRIST SURGERY Right     x3 2006, 2008       Current Outpatient Medications:     atorvastatin (LIPITOR) 10 mg tablet, take 1 tablet by mouth at bedtime, Disp: 90 tablet, Rfl: 1    butalbital-acetaminophen-caffeine (FIORICET,ESGIC) -40 mg per tablet, Take 1 tablet by mouth every 6 (six) hours as needed for headaches, Disp: 15 tablet, Rfl: 4    dexlansoprazole (DEXILANT) 60 MG capsule, Take 1 capsule (60 mg total) by mouth daily, Disp: 90 capsule, Rfl: 3    estradiol (VIVELLE-DOT) 0.05 MG/24HR, Place 1 patch on the skin  "2 (two) times a week, Disp: , Rfl:     fezolinetant (Veozah) tablet, Take 1 tablet (45 mg total) by mouth daily, Disp: 30 tablet, Rfl: 0    meclizine (ANTIVERT) 25 mg tablet, Take 1 tablet (25 mg total) by mouth 3 (three) times a day as needed for dizziness for up to 7 days, Disp: 21 tablet, Rfl: 0    methazolamide (NEPTAZANE) 25 MG tablet, take 1 tablet by mouth three times a day, Disp: 90 tablet, Rfl: 2    Multiple Vitamin (MULTIVITAMIN) tablet, Take 1 tablet by mouth daily Wears a patch, Disp: , Rfl:     Nutritional Supplements (Menopause Formula) TABS, Take by mouth 2 gummies daily, Disp: , Rfl:     PHENobarbital 15 mg tablet, Take 1 tablet (15 mg total) by mouth 2 (two) times a day, Disp: 60 tablet, Rfl: 1    rimegepant sulfate (Nurtec) 75 mg TBDP, Take 1 tablet (75 mg total) by mouth daily as needed (migraine), Disp: 16 tablet, Rfl: 11    tirzepatide (Zepbound) 2.5 mg/0.5 mL auto-injector, Inject 0.5 mL (2.5 mg total) under the skin once a week for 28 days, Disp: 2 mL, Rfl: 0    naloxone (NARCAN) 4 mg/0.1 mL nasal spray, Administer 1 spray into a nostril. If no response after 2-3 minutes, give another dose in the other nostril using a new spray., Disp: 1 each, Rfl: 1    Review of Systems   Constitutional:  Positive for activity change, appetite change, fatigue and unexpected weight change.   Respiratory: Negative.     Cardiovascular: Negative.    Gastrointestinal: Negative.    Musculoskeletal: Negative.    Neurological:  Positive for headaches.   Psychiatric/Behavioral:  The patient is nervous/anxious.        Objective:    /80 (Patient Position: Sitting, Cuff Size: Standard)   Pulse 63   Temp 97.8 °F (36.6 °C) (Tympanic)   Ht 5' 3\" (1.6 m)   Wt 74.2 kg (163 lb 8 oz)   LMP  (LMP Unknown)   BMI 28.96 kg/m²     Physical Exam  Vitals and nursing note reviewed.   Constitutional:       Appearance: Normal appearance.   Cardiovascular:      Rate and Rhythm: Normal rate and regular rhythm.      Pulses: " Normal pulses.      Heart sounds: Normal heart sounds.   Pulmonary:      Effort: Pulmonary effort is normal.      Breath sounds: Normal breath sounds.   Abdominal:      General: Bowel sounds are normal.      Palpations: Abdomen is soft.      Tenderness: There is no abdominal tenderness.   Musculoskeletal:         General: Normal range of motion.   Skin:     General: Skin is warm and dry.   Neurological:      General: No focal deficit present.      Mental Status: She is alert and oriented to person, place, and time.   Psychiatric:         Mood and Affect: Mood normal.         Behavior: Behavior normal.         Thought Content: Thought content normal.         Judgment: Judgment normal.

## 2025-03-25 NOTE — PATIENT INSTRUCTIONS
I have sent Zebound to your pharmacy. The prior authorization process will been done through our prior authorization team and can take up to 3-4 weeks to process through the insurance.     - Start Zepbound 2.5 mg subcutaneously weekly. After you have taken the second pen, please give me an update, as we will likely increase the dose the next month if you are tolerating it well.     - Side effects of Zepbound include nausea, vomiting, diarrhea, or constipation. Keep an eye on your heart rate while on Zepbound. If you resting heart rate is greater than 100 beats per minutes, please notify me. If you develop severe abdominal pain, stop Zepbound and go to the emergency room, as that could be a sign of pancreatitis.     - Please notify me if you have surgery, upper endoscopy, or colonoscopy scheduled, as we typically hold Zepbound for one week prior to the procedure.

## 2025-03-27 ENCOUNTER — NURSE TRIAGE (OUTPATIENT)
Age: 50
End: 2025-03-27

## 2025-03-27 ENCOUNTER — TELEPHONE (OUTPATIENT)
Dept: BARIATRICS | Facility: CLINIC | Age: 50
End: 2025-03-27

## 2025-03-27 NOTE — TELEPHONE ENCOUNTER
PA for Zepbound 2.5 mg  APPROVED  [employer]     Date(s) approved    3/26/2025  to 09/26 /2025     Case #    Patient advised by    Phone       [x][place] [employer] Message  [x]Phone call   []LMOM  []L/M to call office as no active Communication consent on file  []Unable to leave detailed message as VM not approved on Communication consent       Pharmacy advised by    [x]Fax  []Phone call  []Secure Chat    Specialty Pharmacy    []     Approval letter scanned into Media   Approved. . Authorization Expiration Date: September 26, 2025.

## 2025-03-27 NOTE — TELEPHONE ENCOUNTER
"FOLLOW UP: please call w/provider recommendations    REASON FOR CONVERSATION: Head pressure    SYMPTOMS: Pressure all over head, cheeks, teeth, nose. Intermittent shocklike sensations in head (zingers). Denies visual changes, fever, CP, SOB    OTHER:   Patient stays well hydrated  Ophthalmologist - last appt appx 4-5 mos ago  Phenobarbital 15mg - 1 tab BID  Fioricet - prn; takes when pressure/pain is severe  OTC Tylenol (unsure of strength/dose)    DISPOSITION: No disposition on file.    [phone] - okay to leave detailed msg    Nelida - please advise.     Answer Assessment - Initial Assessment Questions  1. REASON FOR CALL: \"What is your main concern right now?\"      Pressure all over head cheeks, teeth, nose. Pressure is constant. It hurts her nose to wear glasses.    2. ONSET: \"When did the pressure start?\"      Started after patient began taking phenobarbital on 2/28/25.    3. SEVERITY: \"How bad is the pressure?\"      8-9/10 at it's worst      9/10 currently    4. FEVER: \"Do you have a fever?\"      No.    5. OTHER SYMPTOMS: \"Do you have any other new symptoms?\"      Patient has vomited x 4 in past 1.5 weeks d/t pressure. Intermittent shocklike sensations in head (zingers).    6. TREATMENTS AND RESPONSE: \"What have you done so far to try to make this better? What medicines have you used?\"      Phenobarbital     7. PREGNANCY: \"Is there any chance you are pregnant?\" \"When was your last menstrual period?\"      No.    Protocols used: No Protocol Available-Adult-[place]    "

## 2025-03-27 NOTE — TELEPHONE ENCOUNTER
Does she feel like the phenobarbital is making her worse?  If she feels like it is helping slightly we can increase it to 2 tabs twice a day.  If she feels it is making her worse and have her stop it and see if she feels better

## 2025-04-14 DIAGNOSIS — E66.3 OVERWEIGHT: ICD-10-CM

## 2025-04-14 DIAGNOSIS — G93.2 PSEUDOTUMOR CEREBRI: ICD-10-CM

## 2025-04-14 RX ORDER — TIRZEPATIDE 5 MG/.5ML
5 INJECTION, SOLUTION SUBCUTANEOUS WEEKLY
Qty: 2 ML | Refills: 2 | Status: SHIPPED | OUTPATIENT
Start: 2025-04-14

## 2025-04-22 ENCOUNTER — OFFICE VISIT (OUTPATIENT)
Dept: GASTROENTEROLOGY | Facility: MEDICAL CENTER | Age: 50
End: 2025-04-22
Payer: MEDICARE

## 2025-04-22 VITALS
HEART RATE: 65 BPM | TEMPERATURE: 98.2 F | DIASTOLIC BLOOD PRESSURE: 75 MMHG | HEIGHT: 63 IN | SYSTOLIC BLOOD PRESSURE: 109 MMHG | OXYGEN SATURATION: 98 % | BODY MASS INDEX: 28.88 KG/M2 | WEIGHT: 163 LBS

## 2025-04-22 DIAGNOSIS — Z90.3 H/O GASTRIC SLEEVE: ICD-10-CM

## 2025-04-22 DIAGNOSIS — Z12.11 ENCOUNTER FOR SCREENING FOR MALIGNANT NEOPLASM OF COLON: ICD-10-CM

## 2025-04-22 DIAGNOSIS — R11.0 NAUSEA: ICD-10-CM

## 2025-04-22 DIAGNOSIS — Z12.11 SCREENING FOR COLON CANCER: Primary | ICD-10-CM

## 2025-04-22 DIAGNOSIS — K21.9 GASTROESOPHAGEAL REFLUX DISEASE WITHOUT ESOPHAGITIS: ICD-10-CM

## 2025-04-22 PROCEDURE — 99214 OFFICE O/P EST MOD 30 MIN: CPT | Performed by: NURSE PRACTITIONER

## 2025-04-22 RX ORDER — BISACODYL 5 MG/1
TABLET, DELAYED RELEASE ORAL
Qty: 4 TABLET | Refills: 0 | Status: SHIPPED | OUTPATIENT
Start: 2025-04-22

## 2025-04-22 RX ORDER — POLYETHYLENE GLYCOL 3350 17 G/17G
238 POWDER, FOR SOLUTION ORAL ONCE
Qty: 238 G | Refills: 0 | Status: SHIPPED | OUTPATIENT
Start: 2025-04-22 | End: 2025-04-22

## 2025-04-22 RX ORDER — SODIUM CHLORIDE, SODIUM LACTATE, POTASSIUM CHLORIDE, CALCIUM CHLORIDE 600; 310; 30; 20 MG/100ML; MG/100ML; MG/100ML; MG/100ML
125 INJECTION, SOLUTION INTRAVENOUS CONTINUOUS
OUTPATIENT
Start: 2025-04-22

## 2025-04-22 NOTE — PROGRESS NOTES
Name: Shweta Nolan      : 1975      MRN: [id number]  Encounter Provider: ABNER Farrell  Encounter Date: 2025   Encounter department: [facility]  :  Assessment & Plan  Screening for colon cancer  Never had a colonoscopy.  BMs are brown and formed 2-3 times per week.  Has been like this for most of her life.  Maternal grandfather  of colon cancer at age 41.  No other family history of colon cancers or polyps.  Denies any melena or hematochezia.  Will rule out colon polyps and neoplasm.    Orders:    Colonoscopy; Future    polyethylene glycol (MiraLax) 17 GM/SCOOP powder; Take 238 g by mouth once for 1 dose Take 238 g my mouth. Use as directed    bisacodyl (DULCOLAX) 5 mg EC tablet; Take as directed by GI office    Nausea  Status post gastric sleeve.  Currently on Zepbound.  Started with nausea since starting Zepbound.  No vomiting.  Does get some upper abdominal discomfort postprandially.  Currently taking Dexilant 60 mg daily which helps to control her reflux but does not help her nausea.  Denies any dysphagia.  Will rule out PUD, gastritis/esophagitis, celiac and H. pylori.    Continue Dexilant 60 mg daily  Antireflux diet  EGD  Follow-up in office after testing with Dr. Gonzales       H/O gastric sleeve  History of gastric sleeve with leak- padlock noted several years ago.       I reviewed with patient/family potential risks of endoscopic evaluation including possible infection, bleeding or perforation.  Patient/family verbalized understanding of potential risks and agreed to procedure(s).    History of Present Illness   Shweta Nolan is a 49 y.o. female who presents for follow-up.  Was last seen by GI 3/23 for history of GERD, postgastrectomy malabsorption, chronic idiopathic constipation and colon cancer screening.    HPI    History of sleeve gastrectomy many years ago.  Paraesophageal hernia repair complicated by leak at the GE junction that  was closed endoscopically with padlock device.  Recently started on Zepbound and is now noting increased nausea but no vomiting.  Currently on Dexilant 60 mg daily which controls her reflux.  BMs are brown and formed 2-3 times per week.  Does not note a change in her frequency of stools since she started Zepbound.  Never had a colonoscopy.    Paternal grandfather  of colon cancer age 41.    Labs -CMP normal, iron studies normal, hemoglobin 15.8 with MCV 89, platelets 199    Prior EGD/colonoscopy    EGD with [facility] -evidence of prior gastric sleeve.  Esophagus and duodenum normal.  DeMeester score 43.    EGD -normal esophagus with a slight nonobstructing stricture/ring at midesophagus.  History of sleeve gastrectomy with leak-padlock seen.    EGD with strata -successful strata application.  Signs of previous sleeve gastrectomy.  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    EGD with [facility] 10/19-signs of previous sleeve gastrectomy.  Paraesophageal hernia repair complicated by leak at the GE junction that was controlled endoscopically with padlock device.  Erythematous mucosa and linear furrows in the lower third of the esophagus LA grade B.      Review of Systems   Constitutional:  Negative for chills and fever.   HENT:  Negative for ear pain and sore throat.    Eyes:  Negative for pain and visual disturbance.   Respiratory:  Negative for cough and shortness of breath.    Cardiovascular:  Negative for chest pain and palpitations.   Gastrointestinal:  Positive for nausea. Negative for abdominal pain and vomiting.   Genitourinary:  Negative for dysuria and hematuria.   Musculoskeletal:  Negative for arthralgias and back pain.   Skin:  Negative for color change and rash.   Neurological:  Negative for seizures and syncope.   All other systems reviewed and are negative.   A complete review of systems is negative other than that noted above in the  "HPI.      Current Outpatient Medications   Medication Sig Dispense Refill    atorvastatin (LIPITOR) 10 mg tablet take 1 tablet by mouth at bedtime 90 tablet 1    butalbital-acetaminophen-caffeine (FIORICET,ESGIC) -40 mg per tablet Take 1 tablet by mouth every 6 (six) hours as needed for headaches 15 tablet 4    dexlansoprazole (DEXILANT) 60 MG capsule Take 1 capsule (60 mg total) by mouth daily 90 capsule 3    estradiol (VIVELLE-DOT) 0.05 MG/24HR Place 1 patch on the skin 2 (two) times a week      fezolinetant (Veozah) tablet Take 1 tablet (45 mg total) by mouth daily 30 tablet 0    meclizine (ANTIVERT) 25 mg tablet Take 1 tablet (25 mg total) by mouth 3 (three) times a day as needed for dizziness for up to 7 days 21 tablet 0    methazolamide (NEPTAZANE) 25 MG tablet take 1 tablet by mouth three times a day 90 tablet 2    Multiple Vitamin (MULTIVITAMIN) tablet Take 1 tablet by mouth daily Wears a patch      Nutritional Supplements (Menopause Formula) TABS Take by mouth 2 gummies daily      PHENobarbital 15 mg tablet Take 1 tablet (15 mg total) by mouth 2 (two) times a day 60 tablet 1    rimegepant sulfate (Nurtec) 75 mg TBDP Take 1 tablet (75 mg total) by mouth daily as needed (migraine) 16 tablet 11    tirzepatide (Zepbound) 5 mg/0.5 mL auto-injector Inject 0.5 mL (5 mg total) under the skin once a week 2 mL 2    naloxone (NARCAN) 4 mg/0.1 mL nasal spray Administer 1 spray into a nostril. If no response after 2-3 minutes, give another dose in the other nostril using a new spray. 1 each 1     No current facility-administered medications for this visit.     Objective   /75 (BP Location: Right arm)   Pulse 65   Temp 98.2 °F (36.8 °C)   Ht 5' 3\" (1.6 m)   Wt 73.9 kg (163 lb)   LMP  (LMP Unknown)   SpO2 98%   BMI 28.87 kg/m²     Physical Exam  Vitals and nursing note reviewed.   Constitutional:       General: She is not in acute distress.     Appearance: She is well-developed.   HENT:      Head: " Normocephalic and atraumatic.   Eyes:      Conjunctiva/sclera: Conjunctivae normal.   Cardiovascular:      Rate and Rhythm: Normal rate and regular rhythm.      Heart sounds: No murmur heard.  Pulmonary:      Effort: Pulmonary effort is normal. No respiratory distress.      Breath sounds: Normal breath sounds.   Abdominal:      General: Bowel sounds are normal.      Palpations: Abdomen is soft.      Tenderness: There is no abdominal tenderness.   Musculoskeletal:         General: No swelling.      Cervical back: Neck supple.   Skin:     General: Skin is warm and dry.      Capillary Refill: Capillary refill takes less than 2 seconds.   Neurological:      Mental Status: She is alert and oriented to person, place, and time.   Psychiatric:         Mood and Affect: Mood normal.            Lab Results: I personally reviewed relevant lab results.       Results for orders placed during the hospital encounter of 02/18/22    EGD    Impression  Successful placement of BRAVO capsule.    RECOMMENDATION:  Continue acid reflux medications.  Follow up in office.    ATTENDING ATTESTATION:  I was present throughout the entire procedure from insertion to complete withdrawal of the scope. I performed all interventions myself or oversaw the fellow.    Andrea Gonzales MD

## 2025-05-14 NOTE — ED NOTES
Hub staff attempted to follow warm transfer process and was unsuccessful     Caller: Neno Green    Relationship to patient: Self    Best call back number: [phone]    Patient is needing: GREEN LIGHT LASER ON 5/6/25.      I DONT FEEL LIKE IM URINATING ENOUGH AND NOT EMPTYING MY BLADDER. SHOULD I BE CONCERNED?    Pt had sudden episode of excruciating pain, writhing, unable to sit still  Assisted pt to GUILLERMINA Mak, MANDIE  10/12/18 [id number]

## 2025-05-19 ENCOUNTER — TELEPHONE (OUTPATIENT)
Dept: NEUROLOGY | Facility: CLINIC | Age: 50
End: 2025-05-19

## 2025-05-19 NOTE — TELEPHONE ENCOUNTER
Patient on 5/19/25 infusion report. Next scheduled infusion for 5/27/25. Authorization expires on 5/17/25. Will initiate reauthorization.

## 2025-05-21 NOTE — TELEPHONE ENCOUNTER
Called [facility] to initiate expedited prior authorization, spoke with Gale who confirmed a form MUST be submitted with each request.    Faxed reauthorization form and last clinic note to [facility] [phone] and noted on fax cover sheet the following:    ** PATIENT HAS INFUSION APPOINTMENT SCHEDULED 25 PLEASE EXPEDITE **    Patient: Shweta Nolan  : 1975  ID #: [id number]    Reauthorization of Vyepti 300 mg IV every 84 days  Units: 1200  Refills: 4    Will follow up.

## 2025-05-22 NOTE — TELEPHONE ENCOUNTER
Call placed to [facility] [phone]. Spoke to Angelina in Utilization management.  approved for 1200 units. Dates of approval are 5/27/2025 -5/27/2026. [facility].    Auth#: [id number]

## 2025-05-27 ENCOUNTER — HOSPITAL ENCOUNTER (OUTPATIENT)
Dept: INFUSION CENTER | Facility: HOSPITAL | Age: 50
Discharge: HOME | End: 2025-05-27
Attending: PHYSICIAN ASSISTANT
Payer: MEDICARE

## 2025-05-27 VITALS
OXYGEN SATURATION: 98 % | RESPIRATION RATE: 16 BRPM | TEMPERATURE: 96.8 F | HEART RATE: 56 BPM | DIASTOLIC BLOOD PRESSURE: 75 MMHG | SYSTOLIC BLOOD PRESSURE: 117 MMHG

## 2025-05-27 DIAGNOSIS — G43.109 MIGRAINE WITH AURA AND WITHOUT STATUS MIGRAINOSUS, NOT INTRACTABLE: Primary | ICD-10-CM

## 2025-05-27 DIAGNOSIS — G93.2 PSEUDOTUMOR CEREBRI: ICD-10-CM

## 2025-05-27 DIAGNOSIS — R79.89 HIGH SERUM PARATHYROID HORMONE (PTH): ICD-10-CM

## 2025-05-27 DIAGNOSIS — Z98.84 BARIATRIC SURGERY STATUS: ICD-10-CM

## 2025-05-27 LAB
ALBUMIN SERPL BCG-MCNC: 4.5 G/DL (ref 3.5–5)
ALP SERPL-CCNC: 53 U/L (ref 34–104)
ALT SERPL W P-5'-P-CCNC: 15 U/L (ref 7–52)
ANION GAP SERPL CALCULATED.3IONS-SCNC: 7 MMOL/L (ref 4–13)
AST SERPL W P-5'-P-CCNC: 19 U/L (ref 13–39)
BASOPHILS # BLD AUTO: 0.04 THOUSANDS/ÂΜL (ref 0–0.1)
BASOPHILS NFR BLD AUTO: 1 % (ref 0–1)
BILIRUB SERPL-MCNC: 0.62 MG/DL (ref 0.2–1)
BUN SERPL-MCNC: 14 MG/DL (ref 5–25)
CALCIUM SERPL-MCNC: 9.8 MG/DL (ref 8.4–10.2)
CHLORIDE SERPL-SCNC: 102 MMOL/L (ref 96–108)
CO2 SERPL-SCNC: 29 MMOL/L (ref 21–32)
CREAT SERPL-MCNC: 0.73 MG/DL (ref 0.6–1.3)
EOSINOPHIL # BLD AUTO: 0.17 THOUSAND/ÂΜL (ref 0–0.61)
EOSINOPHIL NFR BLD AUTO: 4 % (ref 0–6)
ERYTHROCYTE [DISTWIDTH] IN BLOOD BY AUTOMATED COUNT: 13.4 % (ref 11.6–15.1)
GFR SERPL CREATININE-BSD FRML MDRD: 96 ML/MIN/1.73SQ M
GLUCOSE SERPL-MCNC: 73 MG/DL (ref 65–140)
HCT VFR BLD AUTO: 49 % (ref 34.8–46.1)
HGB BLD-MCNC: 16.5 G/DL (ref 11.5–15.4)
IMM GRANULOCYTES # BLD AUTO: 0.01 THOUSAND/UL (ref 0–0.2)
IMM GRANULOCYTES NFR BLD AUTO: 0 % (ref 0–2)
LYMPHOCYTES # BLD AUTO: 1.21 THOUSANDS/ÂΜL (ref 0.6–4.47)
LYMPHOCYTES NFR BLD AUTO: 25 % (ref 14–44)
MCH RBC QN AUTO: 29.9 PG (ref 26.8–34.3)
MCHC RBC AUTO-ENTMCNC: 33.7 G/DL (ref 31.4–37.4)
MCV RBC AUTO: 89 FL (ref 82–98)
MONOCYTES # BLD AUTO: 0.29 THOUSAND/ÂΜL (ref 0.17–1.22)
MONOCYTES NFR BLD AUTO: 6 % (ref 4–12)
NEUTROPHILS # BLD AUTO: 3.2 THOUSANDS/ÂΜL (ref 1.85–7.62)
NEUTS SEG NFR BLD AUTO: 64 % (ref 43–75)
NRBC BLD AUTO-RTO: 0 /100 WBCS
PLATELET # BLD AUTO: 181 THOUSANDS/UL (ref 149–390)
PMV BLD AUTO: 10.6 FL (ref 8.9–12.7)
POTASSIUM SERPL-SCNC: 4.2 MMOL/L (ref 3.5–5.3)
PROT SERPL-MCNC: 6.9 G/DL (ref 6.4–8.4)
RBC # BLD AUTO: 5.51 MILLION/UL (ref 3.81–5.12)
SODIUM SERPL-SCNC: 138 MMOL/L (ref 135–147)
WBC # BLD AUTO: 4.92 THOUSAND/UL (ref 4.31–10.16)

## 2025-05-27 PROCEDURE — 80053 COMPREHEN METABOLIC PANEL: CPT

## 2025-05-27 PROCEDURE — 85025 COMPLETE CBC W/AUTO DIFF WBC: CPT | Performed by: PHYSICIAN ASSISTANT

## 2025-05-27 PROCEDURE — 80184 ASSAY OF PHENOBARBITAL: CPT

## 2025-05-27 PROCEDURE — 96365 THER/PROPH/DIAG IV INF INIT: CPT

## 2025-05-27 PROCEDURE — 83970 ASSAY OF PARATHORMONE: CPT | Performed by: NURSE PRACTITIONER

## 2025-05-27 RX ORDER — SODIUM CHLORIDE 9 MG/ML
20 INJECTION, SOLUTION INTRAVENOUS ONCE
Status: COMPLETED | OUTPATIENT
Start: 2025-05-27 | End: 2025-05-27

## 2025-05-27 RX ORDER — SODIUM CHLORIDE 9 MG/ML
20 INJECTION, SOLUTION INTRAVENOUS ONCE
OUTPATIENT
Start: 2025-08-19

## 2025-05-27 RX ADMIN — SODIUM CHLORIDE 20 ML/HR: 9 INJECTION, SOLUTION INTRAVENOUS at 14:50

## 2025-05-27 RX ADMIN — EPTINEZUMAB-JJMR 300 MG: 100 INJECTION INTRAVENOUS at 15:23

## 2025-05-27 NOTE — PROGRESS NOTES
Shweta Nolan  tolerated Vyepti treatment well with no complications.      Shweta Nolan is aware of future appt on 8/19 at 2PM.     AVS printed and given to Shweta Nolan: No (Declined by Shweta Nolan).

## 2025-05-28 ENCOUNTER — RESULTS FOLLOW-UP (OUTPATIENT)
Dept: NEUROLOGY | Facility: CLINIC | Age: 50
End: 2025-05-28

## 2025-05-28 LAB
PHENOBARB SERPL-MCNC: <0.6 UG/ML (ref 10–40)
PTH-INTACT SERPL-MCNC: 79.4 PG/ML (ref 12–88)

## 2025-05-30 NOTE — TELEPHONE ENCOUNTER
----- Message from Nelida Villanueva PA-C sent at 5/28/2025  3:51 PM EDT -----  Please call the patient regarding her abnormal result.Have her discuss her Hgb and Hct with hematology  ----- Message -----  From: Lab, Background User  Sent: 5/27/2025   5:54 PM EDT  To: Nelida Villanueva PA-C

## 2025-05-30 NOTE — TELEPHONE ENCOUNTER
Called and left a detailed message on pt's answering machine of the below (ok per consent) and for a call back if any questions

## 2025-06-13 ENCOUNTER — TELEPHONE (OUTPATIENT)
Dept: GASTROENTEROLOGY | Facility: CLINIC | Age: 50
End: 2025-06-13

## 2025-06-13 ENCOUNTER — APPOINTMENT (OUTPATIENT)
Dept: LAB | Facility: HOSPITAL | Age: 50
End: 2025-06-13
Payer: MEDICARE

## 2025-06-13 ENCOUNTER — OFFICE VISIT (OUTPATIENT)
Dept: HEMATOLOGY ONCOLOGY | Facility: CLINIC | Age: 50
End: 2025-06-13
Payer: MEDICARE

## 2025-06-13 ENCOUNTER — HOSPITAL ENCOUNTER (OUTPATIENT)
Dept: NON INVASIVE DIAGNOSTICS | Facility: HOSPITAL | Age: 50
Discharge: HOME | End: 2025-06-13
Attending: NURSE PRACTITIONER
Payer: MEDICARE

## 2025-06-13 VITALS
HEART RATE: 65 BPM | OXYGEN SATURATION: 100 % | WEIGHT: 141 LBS | TEMPERATURE: 98.5 F | BODY MASS INDEX: 24.98 KG/M2 | HEIGHT: 63 IN | DIASTOLIC BLOOD PRESSURE: 81 MMHG | SYSTOLIC BLOOD PRESSURE: 119 MMHG

## 2025-06-13 DIAGNOSIS — D50.8 IRON DEFICIENCY ANEMIA SECONDARY TO INADEQUATE DIETARY IRON INTAKE: ICD-10-CM

## 2025-06-13 DIAGNOSIS — D50.8 IRON DEFICIENCY ANEMIA SECONDARY TO INADEQUATE DIETARY IRON INTAKE: Primary | ICD-10-CM

## 2025-06-13 DIAGNOSIS — Z76.89 ENCOUNTER FOR ASSESSMENT FOR DEEP VEIN THROMBOSIS (DVT): ICD-10-CM

## 2025-06-13 DIAGNOSIS — Z90.3 POSTGASTRECTOMY MALABSORPTION: ICD-10-CM

## 2025-06-13 DIAGNOSIS — K91.2 POSTGASTRECTOMY MALABSORPTION: Chronic | ICD-10-CM

## 2025-06-13 DIAGNOSIS — M79.605 PAIN OF LEFT LOWER EXTREMITY: ICD-10-CM

## 2025-06-13 DIAGNOSIS — E53.8 B12 DEFICIENCY: ICD-10-CM

## 2025-06-13 DIAGNOSIS — D58.2 ELEVATED HEMOGLOBIN (HCC): ICD-10-CM

## 2025-06-13 DIAGNOSIS — D75.1 ERYTHROCYTOSIS: ICD-10-CM

## 2025-06-13 DIAGNOSIS — Z90.3 POSTGASTRECTOMY MALABSORPTION: Chronic | ICD-10-CM

## 2025-06-13 DIAGNOSIS — K91.2 POSTGASTRECTOMY MALABSORPTION: ICD-10-CM

## 2025-06-13 LAB
FERRITIN SERPL-MCNC: 149 NG/ML (ref 30–307)
GAS + CO PNL BLDA: 2.7 % (ref 0–1.5)
IRON SATN MFR SERPL: 31 % (ref 15–50)
IRON SERPL-MCNC: 66 UG/DL (ref 50–212)
TIBC SERPL-MCNC: 215.6 UG/DL (ref 250–450)
TRANSFERRIN SERPL-MCNC: 154 MG/DL (ref 203–362)
UIBC SERPL-MCNC: 150 UG/DL (ref 155–355)
VIT B12 SERPL-MCNC: 503 PG/ML (ref 180–914)

## 2025-06-13 PROCEDURE — 93971 EXTREMITY STUDY: CPT

## 2025-06-13 PROCEDURE — 93971 EXTREMITY STUDY: CPT | Performed by: SURGERY

## 2025-06-13 PROCEDURE — 82728 ASSAY OF FERRITIN: CPT

## 2025-06-13 PROCEDURE — 83540 ASSAY OF IRON: CPT

## 2025-06-13 PROCEDURE — 36415 COLL VENOUS BLD VENIPUNCTURE: CPT

## 2025-06-13 PROCEDURE — 99214 OFFICE O/P EST MOD 30 MIN: CPT | Performed by: NURSE PRACTITIONER

## 2025-06-13 PROCEDURE — 82375 ASSAY CARBOXYHB QUANT: CPT

## 2025-06-13 PROCEDURE — 82607 VITAMIN B-12: CPT

## 2025-06-13 PROCEDURE — 83550 IRON BINDING TEST: CPT

## 2025-06-13 RX ORDER — SODIUM CHLORIDE 9 MG/ML
20 INJECTION, SOLUTION INTRAVENOUS ONCE
Status: CANCELLED | OUTPATIENT
Start: 2025-07-08

## 2025-06-13 RX ORDER — CYANOCOBALAMIN 1000 UG/ML
1000 INJECTION, SOLUTION INTRAMUSCULAR; SUBCUTANEOUS ONCE
Status: CANCELLED | OUTPATIENT
Start: 2025-07-08 | End: 2025-06-13

## 2025-06-13 NOTE — LETTER
2025     Nelida Villanueva PA-C  [facility] [address] [facility]  [facility] [address]    Patient: Shweta Nolan   YOB: 1975   Date of Visit: 2025       Dear Dr. Nelida Villanueva PA-C:    Thank you for referring Shweta Nolan to me for evaluation. Below are my notes for this consultation.    If you have questions, please do not hesitate to call me. I look forward to following your patient along with you.         Sincerely,        ABNER Henry        CC: No Recipients    ABNER Henry  2025 12:10 PM  Sign when Signing Visit  Name: Shweta Nolan      : 1975      MRN: [id number]  Encounter Provider: ABNER Henry  Encounter Date: 2025   Encounter department: [facility] [place]  :  Assessment & Plan  Iron deficiency anemia secondary to inadequate dietary iron intake   Most recent iron panel from 2025 showed a saturation of 27% with a ferritin level of 63.  In 2024 ferritin was 117 with a saturation of 45%.  She reports tolerating previous infusions without difficulty.  We will request updated labs now and make arrangements for Venofer 200 mg IV weekly x 5.  She would like to have these done at the [facility].  I will call her with the results as she is not frequently active in [facility].    Orders:  •  Iron Panel (Includes Ferritin, Iron Sat%, Iron, and TIBC); Future    Postgastrectomy malabsorption   Likely main contributor to iron and vitamin B12 deficiencies.  She follows with bariatric medicine as well.    Orders:  •  Iron Panel (Includes Ferritin, Iron Sat%, Iron, and TIBC); Future  •  Vitamin B12; Future  •  Carboxyhemoglobin; Future    B12 deficiency   Vitamin B12 level on 2025 was 343.  With history of gastric sleeve B12 level should be higher than 400.  We will add B12 injections 1000 mcg IM weekly x 5 to the above plan.    Orders:  •  Vitamin B12; Future    Encounter for assessment for deep vein  thrombosis (DVT)   Patient reports an acute left calf pain that started about a week ago.  She was recently started on Zepbound for weight loss 10 weeks ago.  She also is on estradiol hormone patch for menopausal symptoms.  She started this in February 2025.  Will request a Doppler study to evaluate whether she has a blood clot.  She does not have a personal history of clots in the past however with elevated RBC hemoglobin and hematocrit may increase her risk while being on hormonal supplementation. Estrogen replacement has been linked to increased risk of arterial and venous blood clots. https://doi.org/10.1016/j.thromres.2020.05.008. However there may be less risk with transdermal estradiol patch.  We will request a Doppler study to evaluate whether she does have a blood clot and manage accordingly.    Orders:  •  VAS upper limb venous duplex scan, unilateral/limited; Future    Erythrocytosis   Per chart review erythrocytosis and elevated hemoglobin hematocrit have been present since at least November 2023 RBC ranging between 5.08-5.55, hemoglobin 15.3-16.7, hematocrit 44.9-51.0.  Patient previously had an elevated carboxyhemoglobin level last checked in 2023 was 1.9.  She does not have a history of smoking and does not report having a coal stove.    Orders:  •  VAS upper limb venous duplex scan, unilateral/limited; Future  •  Iron Panel (Includes Ferritin, Iron Sat%, Iron, and TIBC); Future  •  Vitamin B12; Future  •  Carboxyhemoglobin; Future    Elevated hemoglobin (HCC)    Orders:  •  VAS upper limb venous duplex scan, unilateral/limited; Future  •  Iron Panel (Includes Ferritin, Iron Sat%, Iron, and TIBC); Future  •  Vitamin B12; Future  •  Carboxyhemoglobin; Future    Pain of left lower extremity    Orders:  •  VAS upper limb venous duplex scan, unilateral/limited; Future  •  Iron Panel (Includes Ferritin, Iron Sat%, Iron, and TIBC); Future  •  Vitamin B12; Future    Return in about 2 months (around  8/26/2025) for Infusion - See Treatment Plan, Office Visit.    History of Present Illness  Pertinent Medical History  Patient was advised to see hematology sooner than anticipated after neurology saw all recent CBC with differential.  On 5/27/2025 CBC with differential showed a normal WBC platelet and differential however RBC was elevated at 5.51, hemoglobin 16.5, hematocrit 49.0 otherwise normal RBC indices.       06/13/25: Patient is a 49-year-old female with a history of gastric sleeve surgery in 2016 followed by laparoscopic paraesophageal hernia repair in 2018 which later resulted in GE junction leak/fistula requiring open repair and lead to sepsis/inpatient hospitalization for 5 months.  Prior to that she was diagnosed with pseudotumor cerebri in 2015 had  shunt placed which had to be removed with her sepsis and later had [facility] shunt placed.  She has  PTSD as a result of her above-mentioned events, kidney stones and migraines.  She does have a neurologist and gastroenterologist who is monitoring her.    Patient has been following with Dr. Mcfarlane for management of iron deficiency and elevated RBC, hemoglobin and hematocrit.  She has undergone Venofer infusions most recent in August 2023 she received Venofer 200 mg IV weekly x 4 with vitamin B12 injections 1000 mcg IM weekly x 4.  She was last seen by hematology in February 2025 and has a follow-up in August 2025.     Review of Systems   Constitutional:  Positive for fatigue. Negative for activity change, appetite change, fever and unexpected weight change.   Respiratory:  Negative for cough and shortness of breath.    Cardiovascular:  Negative for chest pain and leg swelling.   Gastrointestinal:  Negative for abdominal pain, constipation, diarrhea and nausea.   Endocrine: Negative for cold intolerance and heat intolerance.   Musculoskeletal:  Negative for arthralgias and myalgias.        Pain in left calf   Skin: Negative.    Neurological:  Negative for  "dizziness, weakness and headaches.   Hematological:  Negative for adenopathy. Does not bruise/bleed easily.         Objective  /81   Pulse 65   Temp 98.5 °F (36.9 °C) (Temporal)   Ht 5' 3\" (1.6 m)   Wt 64 kg (141 lb)   LMP  (LMP Unknown)   SpO2 100%   BMI 24.98 kg/m²     Physical Exam  Vitals reviewed.   Constitutional:       Appearance: Normal appearance. She is well-developed.   HENT:      Head: Normocephalic and atraumatic.     Eyes:      Conjunctiva/sclera: Conjunctivae normal.      Pupils: Pupils are equal, round, and reactive to light.     Pulmonary:      Effort: Pulmonary effort is normal. No respiratory distress.     Musculoskeletal:         General: Normal range of motion.      Cervical back: Normal range of motion.   Lymphadenopathy:      Cervical: No cervical adenopathy.     Skin:     General: Skin is dry.     Neurological:      Mental Status: She is alert and oriented to person, place, and time.     Psychiatric:         Behavior: Behavior normal.         Labs: I have reviewed the following labs:  Results for orders placed or performed during the hospital encounter of 05/27/25   Comprehensive metabolic panel   Result Value Ref Range    Sodium 138 135 - 147 mmol/L    Potassium 4.2 3.5 - 5.3 mmol/L    Chloride 102 96 - 108 mmol/L    CO2 29 21 - 32 mmol/L    ANION GAP 7 4 - 13 mmol/L    BUN 14 5 - 25 mg/dL    Creatinine 0.73 0.60 - 1.30 mg/dL    Glucose 73 65 - 140 mg/dL    Calcium 9.8 8.4 - 10.2 mg/dL    AST 19 13 - 39 U/L    ALT 15 7 - 52 U/L    Alkaline Phosphatase 53 34 - 104 U/L    Total Protein 6.9 6.4 - 8.4 g/dL    Albumin 4.5 3.5 - 5.0 g/dL    Total Bilirubin 0.62 0.20 - 1.00 mg/dL    eGFR 96 ml/min/1.73sq m   Result Value Ref Range    Phenobarbital Lvl <0.6 (L) 10.0 - 40.0 ug/mL   PTH, intact   Result Value Ref Range    PTH 79.4 12.0 - 88.0 pg/mL   CBC and differential   Result Value Ref Range    WBC 4.92 4.31 - 10.16 Thousand/uL    RBC 5.51 (H) 3.81 - 5.12 Million/uL    Hemoglobin 16.5 " (H) 11.5 - 15.4 g/dL    Hematocrit 49.0 (H) 34.8 - 46.1 %    MCV 89 82 - 98 fL    MCH 29.9 26.8 - 34.3 pg    MCHC 33.7 31.4 - 37.4 g/dL    RDW 13.4 11.6 - 15.1 %    MPV 10.6 8.9 - 12.7 fL    Platelets 181 149 - 390 Thousands/uL    nRBC 0 /100 WBCs    Segmented % 64 43 - 75 %    Immature Grans % 0 0 - 2 %    Lymphocytes % 25 14 - 44 %    Monocytes % 6 4 - 12 %    Eosinophils Relative 4 0 - 6 %    Basophils Relative 1 0 - 1 %    Absolute Neutrophils 3.20 1.85 - 7.62 Thousands/µL    Absolute Immature Grans 0.01 0.00 - 0.20 Thousand/uL    Absolute Lymphocytes 1.21 0.60 - 4.47 Thousands/µL    Absolute Monocytes 0.29 0.17 - 1.22 Thousand/µL    Eosinophils Absolute 0.17 0.00 - 0.61 Thousand/µL    Basophils Absolute 0.04 0.00 - 0.10 Thousands/µL     *Note: Due to a large number of results and/or encounters for the requested time period, some results have not been displayed. A complete set of results can be found in Results Review.

## 2025-06-13 NOTE — PROGRESS NOTES
Name: Shweta Nolan      : 1975      MRN: [id number]  Encounter Provider: ABNER Henry  Encounter Date: 2025   Encounter department: [facility]  :  Assessment & Plan  Iron deficiency anemia secondary to inadequate dietary iron intake   Most recent iron panel from 2025 showed a saturation of 27% with a ferritin level of 63.  In 2024 ferritin was 117 with a saturation of 45%.  She reports tolerating previous infusions without difficulty.  We will request updated labs now and make arrangements for Venofer 200 mg IV weekly x 5.  She would like to have these done at the [facility].  I will call her with the results as she is not frequently active in [employer].    Orders:    Iron Panel (Includes Ferritin, Iron Sat%, Iron, and TIBC); Future    Postgastrectomy malabsorption   Likely main contributor to iron and vitamin B12 deficiencies.  She follows with bariatric medicine as well.    Orders:    Iron Panel (Includes Ferritin, Iron Sat%, Iron, and TIBC); Future    Vitamin B12; Future    Carboxyhemoglobin; Future    B12 deficiency   Vitamin B12 level on 2025 was 343.  With history of gastric sleeve B12 level should be higher than 400.  We will add B12 injections 1000 mcg IM weekly x 5 to the above plan.    Orders:    Vitamin B12; Future    Encounter for assessment for deep vein thrombosis (DVT)   Patient reports an acute left calf pain that started about a week ago.  She was recently started on Zepbound for weight loss 10 weeks ago.  She also is on estradiol hormone patch for menopausal symptoms.  She started this in 2025.  Will request a Doppler study to evaluate whether she has a blood clot.  She does not have a personal history of clots in the past however with elevated RBC hemoglobin and hematocrit may increase her risk while being on hormonal supplementation. Estrogen replacement has been linked to increased risk of arterial and  venous blood clots. https://doi.org/10.1016/j.thromres.2020.05.008. However there may be less risk with transdermal estradiol patch.  We will request a Doppler study to evaluate whether she does have a blood clot and manage accordingly.    Orders:    VAS upper limb venous duplex scan, unilateral/limited; Future    Erythrocytosis   Per chart review erythrocytosis and elevated hemoglobin hematocrit have been present since at least November 2023 RBC ranging between 5.08-5.55, hemoglobin 15.3-16.7, hematocrit 44.9-51.0.  Patient previously had an elevated carboxyhemoglobin level last checked in 2023 was 1.9.  She does not have a history of smoking and does not report having a coal stove.    Orders:    VAS upper limb venous duplex scan, unilateral/limited; Future    Iron Panel (Includes Ferritin, Iron Sat%, Iron, and TIBC); Future    Vitamin B12; Future    Carboxyhemoglobin; Future    Elevated hemoglobin (HCC)   We also discussed utility of requesting a JAK2 mutation to evaluate erythrocytosis.  It appears most recent labs are within her established range.  Patient currently has no history of blood clot however if found to have on today's Doppler study I would recommend requesting JAK2 testing.  She has a follow-up appointment with Dr. Mcfarlane in August and will discuss with him as well.    Orders:    VAS upper limb venous duplex scan, unilateral/limited; Future    Iron Panel (Includes Ferritin, Iron Sat%, Iron, and TIBC); Future    Vitamin B12; Future    Carboxyhemoglobin; Future    Pain of left lower extremity    Orders:    VAS upper limb venous duplex scan, unilateral/limited; Future    Iron Panel (Includes Ferritin, Iron Sat%, Iron, and TIBC); Future    Vitamin B12; Future    Return in about 2 months (around 8/26/2025) for Infusion - See Treatment Plan, Office Visit.    History of Present Illness   Pertinent Medical History   Patient was advised to see hematology sooner than anticipated after neurology saw all recent  "CBC with differential.  On 5/27/2025 CBC with differential showed a normal WBC platelet and differential however RBC was elevated at 5.51, hemoglobin 16.5, hematocrit 49.0 otherwise normal RBC indices.       06/13/25: Patient is a 49-year-old female with a history of gastric sleeve surgery in 2016 followed by laparoscopic paraesophageal hernia repair in 2018 which later resulted in GE junction leak/fistula requiring open repair and lead to sepsis/inpatient hospitalization for 5 months.  Prior to that she was diagnosed with pseudotumor cerebri in 2015 had  shunt placed which had to be removed with her sepsis and later had [facility] shunt placed.  She has  PTSD as a result of her above-mentioned events, kidney stones and migraines.  She does have a neurologist and gastroenterologist who is monitoring her.    Patient has been following with Dr. Mcfarlane for management of iron deficiency and elevated RBC, hemoglobin and hematocrit.  She has undergone Venofer infusions most recent in August 2023 she received Venofer 200 mg IV weekly x 4 with vitamin B12 injections 1000 mcg IM weekly x 4.  She was last seen by hematology in February 2025 and has a follow-up in August 2025.     Review of Systems   Constitutional:  Positive for fatigue. Negative for activity change, appetite change, fever and unexpected weight change.   Respiratory:  Negative for cough and shortness of breath.    Cardiovascular:  Negative for chest pain and leg swelling.   Gastrointestinal:  Negative for abdominal pain, constipation, diarrhea and nausea.   Endocrine: Negative for cold intolerance and heat intolerance.   Musculoskeletal:  Negative for arthralgias and myalgias.        Pain in left calf   Skin: Negative.    Neurological:  Negative for dizziness, weakness and headaches.   Hematological:  Negative for adenopathy. Does not bruise/bleed easily.         Objective   /81   Pulse 65   Temp 98.5 °F (36.9 °C) (Temporal)   Ht 5' 3\" (1.6 m)   Wt 64 kg " (141 lb)   LMP  (LMP Unknown)   SpO2 100%   BMI 24.98 kg/m²     Physical Exam  Vitals reviewed.   Constitutional:       Appearance: Normal appearance. She is well-developed.   HENT:      Head: Normocephalic and atraumatic.     Eyes:      Conjunctiva/sclera: Conjunctivae normal.      Pupils: Pupils are equal, round, and reactive to light.     Pulmonary:      Effort: Pulmonary effort is normal. No respiratory distress.     Musculoskeletal:         General: Normal range of motion.      Cervical back: Normal range of motion.   Lymphadenopathy:      Cervical: No cervical adenopathy.     Skin:     General: Skin is dry.     Neurological:      Mental Status: She is alert and oriented to person, place, and time.     Psychiatric:         Behavior: Behavior normal.         Labs: I have reviewed the following labs:  Results for orders placed or performed during the hospital encounter of 05/27/25   Comprehensive metabolic panel   Result Value Ref Range    Sodium 138 135 - 147 mmol/L    Potassium 4.2 3.5 - 5.3 mmol/L    Chloride 102 96 - 108 mmol/L    CO2 29 21 - 32 mmol/L    ANION GAP 7 4 - 13 mmol/L    BUN 14 5 - 25 mg/dL    Creatinine 0.73 0.60 - 1.30 mg/dL    Glucose 73 65 - 140 mg/dL    Calcium 9.8 8.4 - 10.2 mg/dL    AST 19 13 - 39 U/L    ALT 15 7 - 52 U/L    Alkaline Phosphatase 53 34 - 104 U/L    Total Protein 6.9 6.4 - 8.4 g/dL    Albumin 4.5 3.5 - 5.0 g/dL    Total Bilirubin 0.62 0.20 - 1.00 mg/dL    eGFR 96 ml/min/1.73sq m   Result Value Ref Range    Phenobarbital Lvl <0.6 (L) 10.0 - 40.0 ug/mL   PTH, intact   Result Value Ref Range    PTH 79.4 12.0 - 88.0 pg/mL   CBC and differential   Result Value Ref Range    WBC 4.92 4.31 - 10.16 Thousand/uL    RBC 5.51 (H) 3.81 - 5.12 Million/uL    Hemoglobin 16.5 (H) 11.5 - 15.4 g/dL    Hematocrit 49.0 (H) 34.8 - 46.1 %    MCV 89 82 - 98 fL    MCH 29.9 26.8 - 34.3 pg    MCHC 33.7 31.4 - 37.4 g/dL    RDW 13.4 11.6 - 15.1 %    MPV 10.6 8.9 - 12.7 fL    Platelets 181 149 - 390  Thousands/uL    nRBC 0 /100 WBCs    Segmented % 64 43 - 75 %    Immature Grans % 0 0 - 2 %    Lymphocytes % 25 14 - 44 %    Monocytes % 6 4 - 12 %    Eosinophils Relative 4 0 - 6 %    Basophils Relative 1 0 - 1 %    Absolute Neutrophils 3.20 1.85 - 7.62 Thousands/µL    Absolute Immature Grans 0.01 0.00 - 0.20 Thousand/uL    Absolute Lymphocytes 1.21 0.60 - 4.47 Thousands/µL    Absolute Monocytes 0.29 0.17 - 1.22 Thousand/µL    Eosinophils Absolute 0.17 0.00 - 0.61 Thousand/µL    Basophils Absolute 0.04 0.00 - 0.10 Thousands/µL     *Note: Due to a large number of results and/or encounters for the requested time period, some results have not been displayed. A complete set of results can be found in Results Review.

## 2025-06-13 NOTE — TELEPHONE ENCOUNTER
Please contact Shweta to schedule VENOFER + B12 Combination infusions at [facility] per [facility]'s treatment plan.

## 2025-06-13 NOTE — ASSESSMENT & PLAN NOTE
Most recent iron panel from 2/13/2025 showed a saturation of 27% with a ferritin level of 63.  In July 2024 ferritin was 117 with a saturation of 45%.  She reports tolerating previous infusions without difficulty.  We will request updated labs now and make arrangements for Venofer 200 mg IV weekly x 5.  She would like to have these done at the [facility].  I will call her with the results as she is not frequently active in [employer].    Orders:    Iron Panel (Includes Ferritin, Iron Sat%, Iron, and TIBC); Future

## 2025-06-13 NOTE — ASSESSMENT & PLAN NOTE
Likely main contributor to iron and vitamin B12 deficiencies.  She follows with bariatric medicine as well.    Orders:    Iron Panel (Includes Ferritin, Iron Sat%, Iron, and TIBC); Future    Vitamin B12; Future    Carboxyhemoglobin; Future

## 2025-06-13 NOTE — ASSESSMENT & PLAN NOTE
Vitamin B12 level on 2/13/2025 was 343.  With history of gastric sleeve B12 level should be higher than 400.  We will add B12 injections 1000 mcg IM weekly x 5 to the above plan.    Orders:    Vitamin B12; Future

## 2025-06-16 ENCOUNTER — TELEPHONE (OUTPATIENT)
Dept: BARIATRICS | Facility: CLINIC | Age: 50
End: 2025-06-16

## 2025-06-16 ENCOUNTER — RESULTS FOLLOW-UP (OUTPATIENT)
Dept: HEMATOLOGY ONCOLOGY | Facility: CLINIC | Age: 50
End: 2025-06-16

## 2025-06-16 DIAGNOSIS — D58.2 ELEVATED HEMOGLOBIN (HCC): ICD-10-CM

## 2025-06-16 DIAGNOSIS — D75.1 ERYTHROCYTOSIS: Primary | ICD-10-CM

## 2025-06-17 ENCOUNTER — OFFICE VISIT (OUTPATIENT)
Dept: NEUROLOGY | Facility: CLINIC | Age: 50
End: 2025-06-17
Payer: MEDICARE

## 2025-06-17 VITALS
BODY MASS INDEX: 24.45 KG/M2 | SYSTOLIC BLOOD PRESSURE: 126 MMHG | OXYGEN SATURATION: 100 % | HEIGHT: 63 IN | WEIGHT: 138 LBS | DIASTOLIC BLOOD PRESSURE: 82 MMHG | HEART RATE: 64 BPM

## 2025-06-17 DIAGNOSIS — G93.2 PSEUDOTUMOR CEREBRI: ICD-10-CM

## 2025-06-17 DIAGNOSIS — G43.109 MIGRAINE WITH AURA AND WITHOUT STATUS MIGRAINOSUS, NOT INTRACTABLE: ICD-10-CM

## 2025-06-17 PROCEDURE — 99215 OFFICE O/P EST HI 40 MIN: CPT | Performed by: PHYSICIAN ASSISTANT

## 2025-06-17 RX ORDER — BUTALBITAL, ACETAMINOPHEN AND CAFFEINE 50; 325; 40 MG/1; MG/1; MG/1
1 TABLET ORAL EVERY 6 HOURS PRN
Qty: 15 TABLET | Refills: 4 | Status: SHIPPED | OUTPATIENT
Start: 2025-06-17

## 2025-06-17 RX ORDER — METHAZOLAMIDE 25 MG/1
25 TABLET ORAL EVERY 8 HOURS PRN
Qty: 90 TABLET | Refills: 2 | Status: SHIPPED | OUTPATIENT
Start: 2025-06-17

## 2025-06-17 RX ORDER — RIMEGEPANT SULFATE 75 MG/75MG
75 TABLET, ORALLY DISINTEGRATING ORAL DAILY PRN
Qty: 16 TABLET | Refills: 11 | Status: SHIPPED | OUTPATIENT
Start: 2025-06-17

## 2025-06-17 NOTE — ASSESSMENT & PLAN NOTE
Continue with ophthalmology exams at least yearly if not sooner  Orders:  •  methazolamide (NEPTAZANE) 25 MG tablet; Take 1 tablet (25 mg total) by mouth every 8 (eight) hours as needed (weather/sinus headaches)

## 2025-06-17 NOTE — PATIENT INSTRUCTIONS
Look into cefaly device and the nerivio    1. Migraine with aura and without status migrainosus, not intractable  Assessment & Plan:   Preventive:  Continue all your vitamins  Vyepti 300 mg every 12 weeks  For your weather headaches, take methazolamide 25 mg every 6-8 hours.  Can use as often as needed.  If you know a storm or weather front is coming start before it gets bad    Abortive:   At onset of worsening headache take Nurtec 75 mg.  If needed use Fioricet but only 15 a month    SmartLink not supported outside of the Encounter Diagnoses [facility].    Orders:  -     butalbital-acetaminophen-caffeine (FIORICET,ESGIC) -40 mg per tablet; Take 1 tablet by mouth every 6 (six) hours as needed for headaches  -     rimegepant sulfate (Nurtec) 75 mg TBDP; Take 1 tablet (75 mg total) by mouth daily as needed (migraine)  2. Pseudotumor cerebri  -     methazolamide (NEPTAZANE) 25 MG tablet; Take 1 tablet (25 mg total) by mouth every 8 (eight) hours as needed (weather/sinus headaches)

## 2025-06-17 NOTE — ASSESSMENT & PLAN NOTE
Preventive:  Continue all your vitamins  Vyepti 300 mg every 12 weeks  For your weather headaches, take methazolamide 25 mg every 6-8 hours.  Can use as often as needed.  If you know a storm or weather front is coming start before it gets bad    Abortive:   At onset of worsening headache take Nurtec 75 mg.  If needed use Fioricet but only 15 a month    Orders:  •  butalbital-acetaminophen-caffeine (FIORICET,ESGIC) -40 mg per tablet; Take 1 tablet by mouth every 6 (six) hours as needed for headaches  •  rimegepant sulfate (Nurtec) 75 mg TBDP; Take 1 tablet (75 mg total) by mouth daily as needed (migraine)

## 2025-06-17 NOTE — PROGRESS NOTES
Review of Systems   Constitutional:  Negative for appetite change, fatigue and fever.   HENT: Negative.  Negative for hearing loss, tinnitus, trouble swallowing and voice change.    Eyes: Negative.  Negative for photophobia, pain and visual disturbance.   Respiratory: Negative.  Negative for shortness of breath.    Cardiovascular: Negative.  Negative for palpitations.   Gastrointestinal: Negative.  Negative for nausea and vomiting.   Endocrine: Negative.  Negative for cold intolerance.   Genitourinary: Negative.  Negative for dysuria, frequency and urgency.   Musculoskeletal:  Negative for back pain, gait problem, myalgias, neck pain and neck stiffness.   Skin: Negative.  Negative for rash.   Allergic/Immunologic: Negative.    Neurological:  Positive for headaches (Had increase due to weather). Negative for dizziness, tremors, seizures, syncope, facial asymmetry, speech difficulty, weakness, light-headedness and numbness.   Hematological: Negative.  Does not bruise/bleed easily.   Psychiatric/Behavioral: Negative.  Negative for confusion, hallucinations and sleep disturbance.

## 2025-06-17 NOTE — PROGRESS NOTES
"Name: Shweta Nolan      : 1975      MRN: [id number]  Encounter Provider: Nelida Villanueva PA-C  Encounter Date: 2025   Encounter department: [facility] [place] [facility]  :  Assessment & Plan  Migraine with aura and without status migrainosus, not intractable   Preventive:  Continue all your vitamins  Vyepti 300 mg every 12 weeks  For your weather headaches, take methazolamide 25 mg every 6-8 hours.  Can use as often as needed.  If you know a storm or weather front is coming start before it gets bad    Abortive:   At onset of worsening headache take Nurtec 75 mg.  If needed use Fioricet but only 15 a month    Orders:  •  butalbital-acetaminophen-caffeine (FIORICET,ESGIC) -40 mg per tablet; Take 1 tablet by mouth every 6 (six) hours as needed for headaches  •  rimegepant sulfate (Nurtec) 75 mg TBDP; Take 1 tablet (75 mg total) by mouth daily as needed (migraine)    Pseudotumor cerebri  Continue with ophthalmology exams at least yearly if not sooner  Orders:  •  methazolamide (NEPTAZANE) 25 MG tablet; Take 1 tablet (25 mg total) by mouth every 8 (eight) hours as needed (weather/sinus headaches)          History of Present Illness   HPI   Shweta Nolan is a 49 y.o. Female who presents for pseudotumor cerebri s/p shunt     Sleep medicine-- as of 2025 has not sleep      Past medical history:  Went to [place] for hiatal hernia and was in ICU for a long time.  Was septic after her surgeries and had her  shunt removed 2018 due to concerns of bacterial infection spreading through the shunt     Patient complains of pain in the right side of head like had been hit.  Also has \"zingers\" 1-2 a week and lasts a minute to 30 minutes.  Sometimes has to lay down for the rest of the day.  and hears a vibration noise for 2-3 week.  Sometimes is very loud and annoying.  This pain began in mid November.  Pain is the same as before.  Pain is similar to prior to shunt replacement.  Noise is only " new symptoms.     Idiopathic intracranial hypertension:  She is s/p lumboperitoneal shunt placement for increased intracranial pressure, performed at [place] in 2015 and removal of the shunt and placement of a  shunt in May of 2017. She is also status post gastric bypass surgery and has lost 60lbs.      She saw her ophthalmologist 3/2020 and there was no optic nerve swelling.     Patient reports she has daily pressure and pain. Has good days and bad days but is always has pressure.  States that had eye exam approx 6 months ago and was fine.  Is scheduled to 4/14/2023 for another exam     What medications do you take or have you taken for your headaches?   Current Preventative:  Vitamin-C vitamin-D folic acid multivitamin vitamin B12  Vyepti 300mg     Current abortive:  Nurtec  Methazolamide  Fioricet or medical marijuana     Prior PREVENTIVE:  nortriptyline (this helped her headaches),  Protriptyline  Gabapentin (off balance), Depakote, lamictal  Diamox (stopped by provider because of her history of kidney stones), Methazolamide   Verapamil (fatigue),  Emgality (3 doses, didn't notice a difference)     Prior ABORTIVE:   Tramadol (stopped on her own),   Toradol (stopped on her own), Indomethacin     Non-Medical/Alternative Treatments used in the past for headaches: Has tried Massage (doesn’t help headaches), Chiropractic adjustment (doesn’t help with headaches), Acupuncture (doesn’t help with headaches)     What is your current pain level - 9/10,     How often do the headaches occur -   Baseline pain is : daily is 2-3/10  Increase pain: does have variable days of increasing pain.  Pain can increase for 30 minutes or last all day every couple of days but has been worsening every day for 2+weeks  Can have 10/10 pain once a week (when weather is bad)  She is also getting the sensation of light headed and pain in the frontal region with bending over or getting up from sitting potision      What time of the day do  "the headaches start -   Baseline pain: there all the time  Increase pain: anytime of the day     How long do the headaches last -   Baseline pain is : there all the time  Increase pain: few minutes to rest of the day  She is also getting the sensation of light headed and pain in the frontal region with bending over or getting up from sitting poistion-this is also variable, usually happens 1-2 times a week.  Lasts only a minute or 2     Are you ever headache free - no always has pressure in her head     Where are they located -   Baseline pain: entire head  Increase pain: one temple to the other or frontal to the back of her head  Position change: frontal only     What is the intensity of pain -   Baseline pain: 4-5/10  Intense pain: 10/10  Position change pain: 9/10     Any warning prior to headache and how long do they last - N/A, they're constant     Describe your usual headache -   Baseline pain: Pressure, \"like my brain is going to explode out of my skull\"  Increase pain: zap, zinger and it can be sharp (pt has difficulty describing the sensation)  Position change: sharp pressure     Associated symptoms:   -       Problem with concentration  -       Blurred vision occasionally  -       Dizziness when extending or flexing neck to look up or down  -       prefer to be alone and in a dark room, unable to work     Headache are worse if the patient: cough, sneeze, bending over, moving bowel, running or exercising,   Headache trigger: N/A, they're constant     Have you used CBD or THC for your headaches and how often? Yes, has medical card  Are you current pregnant or planning on getting pregnant? No  Have you ever had any Brain imaging? yes  I personally reviewed these images.        MRAs of the head and neck without contrast done on 3/29/17 are both unremarkable.     Brain MRI without contrast on 3/27/17 shows ventricles in the lower limits of normal in size consistent with pseudotumor cerebri, as well as scattered " "parenchymal WM changes which are nonspecific.     Head CT 7/17: No acute intracranial abnormality.     LP @ [facility] April/ 2017: OP 20, CP14     Reviewed old notes from physician seen in the past- see above HPI for summary of previous encounters.   Review of Systems I have personally reviewed the MA's review of systems and made changes as necessary.         Objective   /82 (BP Location: Left arm, Patient Position: Sitting, Cuff Size: Standard)   Pulse 64   Ht 5' 3\" (1.6 m)   Wt 62.6 kg (138 lb)   LMP  (LMP Unknown)   SpO2 100%   BMI 24.45 kg/m²     Physical Exam  Neurological Exam  CONSTITUTIONAL: Well developed, well nourished, well groomed. No dysmorphic features.     HEENT:  Normocephalic atraumatic.    Chest:  Respirations regular and unlabored.    Psychiatric:  Normal behavior and appropriate affect      MENTAL STATUS  Orientation: Alert and oriented x 3  Fund of knowledge: Intact.        Administrative Statements   I have spent a total time of 59 minutes in caring for this patient on the day of the visit/encounter including Prognosis, Risks and benefits of tx options, Instructions for management, Patient and family education, Impressions, Counseling / Coordination of care, Documenting in the medical record, Reviewing/placing orders in the medical record (including tests, medications, and/or procedures), and Obtaining or reviewing history  .  "

## 2025-06-20 ENCOUNTER — APPOINTMENT (OUTPATIENT)
Dept: LAB | Facility: HOSPITAL | Age: 50
End: 2025-06-20
Payer: MEDICARE

## 2025-06-20 DIAGNOSIS — D75.1 ERYTHROCYTOSIS: ICD-10-CM

## 2025-06-20 DIAGNOSIS — D58.2 ELEVATED HEMOGLOBIN (HCC): ICD-10-CM

## 2025-06-20 PROCEDURE — 81219 CALR GENE COM VARIANTS: CPT

## 2025-06-20 PROCEDURE — 36415 COLL VENOUS BLD VENIPUNCTURE: CPT

## 2025-06-20 PROCEDURE — 81339 MPL GENE SEQ ALYS EXON 10: CPT

## 2025-06-20 PROCEDURE — 81279 JAK2 GENE TRGT SEQUENCE ALYS: CPT

## 2025-06-20 PROCEDURE — 81270 JAK2 GENE: CPT

## 2025-06-26 ENCOUNTER — OFFICE VISIT (OUTPATIENT)
Dept: BARIATRICS | Facility: CLINIC | Age: 50
End: 2025-06-26
Payer: MEDICARE

## 2025-06-26 VITALS
SYSTOLIC BLOOD PRESSURE: 100 MMHG | HEIGHT: 63 IN | OXYGEN SATURATION: 98 % | BODY MASS INDEX: 24.19 KG/M2 | HEART RATE: 73 BPM | WEIGHT: 136.5 LBS | DIASTOLIC BLOOD PRESSURE: 70 MMHG | TEMPERATURE: 97.6 F

## 2025-06-26 DIAGNOSIS — Z86.39 HX OF OBESITY: ICD-10-CM

## 2025-06-26 DIAGNOSIS — Z48.815 ENCOUNTER FOR SURGICAL AFTERCARE FOLLOWING SURGERY OF DIGESTIVE SYSTEM: Primary | ICD-10-CM

## 2025-06-26 PROCEDURE — 99214 OFFICE O/P EST MOD 30 MIN: CPT | Performed by: NURSE PRACTITIONER

## 2025-06-26 RX ORDER — TIRZEPATIDE 5 MG/.5ML
5 INJECTION, SOLUTION SUBCUTANEOUS WEEKLY
Qty: 2 ML | Refills: 2 | Status: SHIPPED | OUTPATIENT
Start: 2025-06-26

## 2025-06-26 NOTE — PROGRESS NOTES
Assessment/Plan:    Hx of Obesity/BMI 24  - doing very well with zepbound 5 mg/week. She denies any adverse effects. Initially had fatigue but this has improved since. At baseline, struggled with constipation and now she feels this also have improved.   - she is very fearful of stopping zepbound.   - discussed we need to work on increasing her caloric intake in order to maintain her on this current dose.   - recommended to continue with this dose and work on increasing her protein intake and caloric intake.   - recommended to see our RD but she would like to defer this for now.       Highest weight 213 lbs (prior to sleeve); 137 lbs (prior to stretta)   Initial weight on Zepbound - 163.5 lbs; BMI 28 - 03/25/2025  Current Weight 136.5 lbs, BMI 24.18 (-27 LBS = 16.5% total weight loss)  Keron 80-85 lbs  Goal - 120-130 lbs  5% weight loss - 8.2 lbs (155.3 lbs) - achieved  20% weight loss - 32.7 lbs (130.8 lbs)      Follow up in approximately 3 months with Surgical Advanced Practitioner.  Goals:  Food log (ie.) [email or web address],Ruth Kunstadter â[email or web address],etc. baritastic  No sugary beverages. At least 64oz of water daily.  Increase physical activity by 10 minutes daily. Gradually increase physical activity to a goal of 5 days per week for 30 minutes of MODERATE intensity PLUS 2 days per week of FULL BODY resistance training  5-10 servings of fruits and vegetables per day and 25-35 grams of dietary fiber per day, gradually increasing  No sugary beverages. At least 64oz of water daily., Increase physical activity by 10 minutes daily, Practice lesson plans 1-6 in bariatric manual , Practice 30/60 rule, Gradually increase physical activity to a goal of 5 days per week for 30 minutes of MODERATE intensity PLUS 2 days per week of FULL BODY resistance training, Continue with dietary and behavioral recommendations outlined in bariatric manual, Continue to take recommended bariatric vitamins as directed,  Goal protein intake of 60-80 grams per day, 5-10 servings of fruits and vegetables per day, 25-35 grams of dietary fiber per day, and 5063-1349 calories per day    Bariatric surgery status - s/p gastric sleeve. Since she started dexilant her GERD symptoms have been more controlled. Since starting on zepbound there has not been any changes to her symptoms. Continue to monitor for now.     Diagnoses and all orders for this visit:    Encounter for surgical aftercare following surgery of digestive system    Hx of obesity  -     tirzepatide (Zepbound) 5 mg/0.5 mL auto-injector; Inject 0.5 mL (5 mg total) under the skin once a week    BMI 24.0-24.9, adult  -     tirzepatide (Zepbound) 5 mg/0.5 mL auto-injector; Inject 0.5 mL (5 mg total) under the skin once a week        Subjective:   Chief Complaint   Patient presents with    Follow-up     3 month post-op weight gain follow-up     Patient ID: Shweta Nolan  is a 49 y.o. female with excess weight/obesity here to pursue medical weight management. -s/p Vertical Sleeve Gastrectomy with Dr. Powell on 2016. 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 Had EGD with [facility] with Dr. Sha Gonzalez on 02/05/2020. Here for [facility] follow up.   Past Medical History[1]    HPI:  Obesity/Excess Weight:   On zepbound 5 mg/week and has been tolerating this well. She denies any adverse effects.  Initially had fatigue but this has improved since. At baseline, struggled with constipation and now she feels this also have improved.  Noticed decrease in cravings and decrease in portion sizes. Very happy with her weight loss. Wants to lose a bit more as her old clothes does not fit still.       Highest weight 213 lbs (prior to sleeve); 137 lbs (prior to stretta)   Initial weight on Zepbound - 163.5 lbs; BMI 28 - 03/25/2025  Current Weight 136.5  lbs; BMI 24.18 (-27 LBS = 16.5% total weight loss)  Keron 80-85 lbs  Goal - 120-130 lbs  5% weight loss - 8.2 lbs (155.3 lbs) - achieved  20% weight loss - 32.7 lbs (130.8 lbs)       Hydration: Follows 30/60 minute rule; Drinks powerade and sparking water  Alcohol: none  Exercise: was going to the gym 5 times per week but currently on hold due to her worsening migraines   Dining out:  Once every 2 weeks - watches what she tends to eat  Occupation:   Sleep: 5 -6 hrs of sleep  STOPBAN/8  Contraception: menopausal     B: peppermint tea with stevia, protein waffle, added protein shake    S: protein bar or granola bar   L: Apples and cheese, and pepper with cream cheese  S: fruits  D: protein, veggies and starch (Potatoes)  S: none       Wt Readings from Last 3 Encounters:   25 61.9 kg (136 lb 8 oz)   25 62.6 kg (138 lb)   25 64 kg (141 lb)     Patient is not pregnant/breastfeeding (metformin only)    Has a fhx of thyroid cancer - brother; but was PAPILLARY THYROID CA. Patient denies personal and family history of  pancreatitis and MEN-2 tumors.     + hx of kidney stones - Denies any hx of glaucoma, seizures, gallstones. (Contraindicated for topamax)     Denies Hx of CAD, PAD, palpitations, arrhythmia, uncontrolled HTN, hyperthyroidism or use of stimulant medications     Has underlying depression/anxiety. Would avoid phentermine. Denies suicidal behavior or thinking , insomnia or sleep disturbance.  - can consider wellbutrin     Denies chronic opioid use. - can consider naltrexone or contrave.     The following portions of the patient's history were reviewed and updated as appropriate: allergies, current medications, past family history, past medical history, past social history, past surgical history, and problem list.    Past Medical History[2]  Past Surgical History[3]  Current Medications[4]    Review of Systems   Constitutional: Negative.    Respiratory: Negative.    "  Cardiovascular: Negative.    Gastrointestinal: Negative.    Musculoskeletal: Negative.    Neurological: Negative.    Psychiatric/Behavioral: Negative.         Objective:    /70 (BP Location: Left arm, Patient Position: Sitting, Cuff Size: Large)   Pulse 73   Temp 97.6 °F (36.4 °C) (Tympanic)   Ht 5' 3\" (1.6 m)   Wt 61.9 kg (136 lb 8 oz)   LMP  (LMP Unknown)   SpO2 98%   BMI 24.18 kg/m²     Physical Exam  Vitals and nursing note reviewed.   Constitutional:       Appearance: Normal appearance. She is normal weight.     Cardiovascular:      Rate and Rhythm: Normal rate and regular rhythm.      Pulses: Normal pulses.      Heart sounds: Normal heart sounds.   Pulmonary:      Effort: Pulmonary effort is normal.      Breath sounds: Normal breath sounds.   Abdominal:      General: Bowel sounds are normal.      Palpations: Abdomen is soft.      Tenderness: There is no abdominal tenderness.     Musculoskeletal:         General: Normal range of motion.     Skin:     General: Skin is warm and dry.     Neurological:      General: No focal deficit present.      Mental Status: She is alert and oriented to person, place, and time.     Psychiatric:         Mood and Affect: Mood normal.         Behavior: Behavior normal.         Thought Content: Thought content normal.         Judgment: Judgment normal.              [1]   Past Medical History:  Diagnosis Date    Abdominal pain     Acute cystitis with hematuria 01/03/2020    Brain condition     Pseudotumor Cerebri     Chronic kidney disease     COVID-19 virus infection 11/10/2022    De Quervain's disease (tenosynovitis)     Esophageal perforation     Gastric leak     GERD (gastroesophageal reflux disease)     Headache     Idiopathic intracranial hypertension     Kidney stone     Moderate protein-calorie malnutrition (HCC) 02/21/2018    No blood products     per pt: personal and Anabaptist beliefs. surgeon office aware 12/13/19    Papilledema, both eyes     PONV " (postoperative nausea and vomiting)     Postgastrectomy malabsorption     Presence of lumboperitoneal shunt     Resolved: Sep 20, 2017    Rotator cuff tendinitis     Resolved: Aug 23, 2017    Visual field defect    [2]   Past Medical History:  Diagnosis Date    Abdominal pain     Acute cystitis with hematuria 01/03/2020    Brain condition     Pseudotumor Cerebri     Chronic kidney disease     COVID-19 virus infection 11/10/2022    De Quervain's disease (tenosynovitis)     Esophageal perforation     Gastric leak     GERD (gastroesophageal reflux disease)     Headache     Idiopathic intracranial hypertension     Kidney stone     Moderate protein-calorie malnutrition (HCC) 02/21/2018    No blood products     per pt: personal and Rastafari beliefs. surgeon office aware 12/13/19    Papilledema, both eyes     PONV (postoperative nausea and vomiting)     Postgastrectomy malabsorption     Presence of lumboperitoneal shunt     Resolved: Sep 20, 2017    Rotator cuff tendinitis     Resolved: Aug 23, 2017    Visual field defect    [3]   Past Surgical History:  Procedure Laterality Date    BONE EXOSTOSIS EXCISION Right 11/16/2023    Procedure: EXCISION EXOSTOSIS 5TH TOE RIght;  Surgeon: Sri Herring DPM;  [facility];  Service: Podiatry    BONE EXOSTOSIS EXCISION Right 2/29/2024    Procedure: EXCISION EXOSTOSIS 5TH TOE;  Surgeon: Sri Herring DPM;  [facility];  Service: Podiatry    BREAST BIOPSY Left 1993    benign    CSF SHUNT      LP shunt - 2015 -  shunt - 2017    ESOPHAGOGASTRODUODENOSCOPY N/A 02/23/2018    Procedure: ESOPHAGOGASTRODUODENOSCOPY (EGD) WITH ESOPHAGEAL STENT PLACEMENT;  Surgeon: Sergio Chung MD;  [facility];  Service: Thoracic    ESOPHAGOGASTRODUODENOSCOPY N/A 02/23/2018    Procedure: ESOPHAGOGASTRODUODENOSCOPY (EGD) WITH REMOVAL ESOPHAGEAL STENT  AND REPLACEMENT WITH 23mm X155mm WALLFLEX ESOPHAGEAL STENT;  Surgeon: Sergio Chung MD;  [facility];  Service:  "Thoracic    ESOPHAGOGASTRODUODENOSCOPY N/A 03/28/2018    Procedure: ESOPHAGOGASTRODUODENOSCOPY (EGD) with PEJ placement.;  Surgeon: Andrea Gonzales MD;  [facility];  Service: Gastroenterology    ESOPHAGOGASTRODUODENOSCOPY N/A 04/05/2018    Procedure: ESOPHAGOGASTRODUODENOSCOPY (EGD) with botox injection and kaofed placement;  Surgeon: Naomie Leroy DO;  [facility];  Service: Gastroenterology    ESOPHAGOGASTRODUODENOSCOPY N/A 04/10/2018    Procedure: ESOPHAGOGASTRODUODENOSCOPY (EGD) with Kaofed placement;  Surgeon: Saroj Kirkland MD;  [facility];  Service: Gastroenterology    ESOPHAGOSCOPY WITH STENT INSERTION N/A 01/24/2018    Procedure: INSERTION STENT ESOPHAGEAL;  Surgeon: Gonsalo Yang MD;  [facility];  Service: Gastroenterology    EYE SURGERY Bilateral 1980    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)     FL LUMBAR PUNCTURE DIAGNOSTIC  4/10/2017    FL RETROGRADE PYELOGRAM  06/24/2020    FL RETROGRADE PYELOGRAM  08/21/2021    FL RETROGRADE PYELOGRAM  5/4/2023    FL RETROGRADE PYELOGRAM  5/18/2023    GASTRIC BYPASS  12/19/2016    Lap sleeve gastrectomy w/shunt length shortening procedure    HIATAL HERNIA REPAIR      HYSTERECTOMY  03/14/2013    IR LUMBAR PUNCTURE  08/29/2018    LAPAROTOMY N/A 01/25/2018    Procedure: Exploratory Laparotomy, wash out,placement of drains, placement of NG feeding tube ;  Surgeon: Ruperto Jasso DO;  [facility];  Service: General    LUMBAR PERITONEAL SHUNT  11/19/2015    Laparoscopic assisted    CT BREAST REDUCTION Bilateral 03/25/2022    Procedure: BILATERAL BREAST REDUCTION;  Surgeon: Indio Williamson MD;  [facility];  Service: Plastics    CT CRTJ SHUNT [id number] Right 12/18/2019    Procedure: IMAGE GUIDED INSERTION OF RIGHT CORONAL VENTRICULAR-ATRIAL SHUNT;  Surgeon: Edouard Pires MD;  [facility];  Service: Neurosurgery    CT CRTJ SHUNT [id number] TERMINUS Right 05/31/2017    " Procedure: IMAGE GUIDED CORONAL PLACEMENT OF PROGRAMABLE VENTRICULAR-PERITONEAL SHUNT, REMOVAL OF LP SHUNT ;  Surgeon: Edouard Pires MD;  [facility];  Service: Neurosurgery    KS CYSTO/URETERO W/LITHOTRIPSY &INDWELL STENT INSRT Bilateral 06/24/2020    Procedure: CYSTOSCOPY URETEROSCOPY WITH LITHOTRIPSY HOLMIUM LASER, RETROGRADE PYELOGRAM AND exchange bilateral  STENTs URETERAL;  Surgeon: Mark Godfrey MD;  [facility];  Service: Urology    KS CYSTO/URETERO W/LITHOTRIPSY &INDWELL STENT INSRT Left 08/21/2021    Procedure: CYSTOSCOPY; LEFT URETEROSCOPY WITH RETROGRADE PYELOGRAM, REMOVAL OF STONE AND INSERTION LEFT URETERAL STENT;  Surgeon: Jamie Nuñez MD;  [facility];  Service: Urology    KS CYSTO/URETERO W/LITHOTRIPSY &INDWELL STENT INSRT N/A 5/18/2023    Procedure: CYSTOSCOPY URETEROSCOPY WITH LITHOTRIPSY HOLMIUM LASER, RETROGRADE PYELOGRAM, REMOVAL OF BILATERAL STENTS,  AND INSERTION STENT URETERAL LEFT ,BASKET EXTRACTION OF STONE LEFT SIDE;  Surgeon: Jamie Nuñez MD;  [facility];  Service: Urology    KS CYSTOURETHROSCOPY W/URETERAL CATHETERIZATION N/A 06/06/2020    Procedure: Bilateral CYSTOSCOPY RETROGRADE PYELOGRAM WITH INSERTION STENT URETERAL;  Surgeon: Moe Mcgrath MD;  Location:  MAIN OR;  Service: Urology    KS CYSTOURETHROSCOPY W/URETERAL CATHETERIZATION Bilateral 5/4/2023    Procedure: CYSTOSCOPY RETROGRADE PYELOGRAM WITH INSERTION STENT URETERAL;  Surgeon: Moe Mcgrath MD;  [facility];  Service: Urology    KS EGD TRANSORAL BIOPSY SINGLE/MULTIPLE N/A 06/27/2018    Procedure: ESOPHAGOGASTRODUODENOSCOPY (EGD) with padlock clip placement;  Surgeon: Lisbet Gonzalez MD;  [facility];  Service: Bariatrics    KS RMVL COMPL CSF SHUNT SYSTEM W/O RPLCMT SHUNT Right 02/05/2018    Procedure: Removal of  shunt;  Surgeon: Edouard Pires MD;  [facility];  Service: Neurosurgery    KS RPLCMT/REVJ CSF SHUNT VALVE/CATH SHUNT  SYS Right 01/25/2018    Procedure: Externalization of right-sided SHUNT VENTRICULAR-PERITONEAL in anterior chest wall ribs two and three level  ;  Surgeon: Miquel Duarte MD;  [facility];  Service: Neurosurgery    REDUCTION MAMMAPLASTY Bilateral     WRIST SURGERY Right     x3 2006, 2008   [4]   Current Outpatient Medications:     atorvastatin (LIPITOR) 10 mg tablet, take 1 tablet by mouth at bedtime, Disp: 90 tablet, Rfl: 1    butalbital-acetaminophen-caffeine (FIORICET,ESGIC) -40 mg per tablet, Take 1 tablet by mouth every 6 (six) hours as needed for headaches, Disp: 15 tablet, Rfl: 4    dexlansoprazole (DEXILANT) 60 MG capsule, Take 1 capsule (60 mg total) by mouth daily, Disp: 90 capsule, Rfl: 3    estradiol (VIVELLE-DOT) 0.05 MG/24HR, Place 1 patch on the skin 2 (two) times a week, Disp: , Rfl:     meclizine (ANTIVERT) 25 mg tablet, Take 1 tablet (25 mg total) by mouth 3 (three) times a day as needed for dizziness for up to 7 days, Disp: 21 tablet, Rfl: 0    methazolamide (NEPTAZANE) 25 MG tablet, Take 1 tablet (25 mg total) by mouth every 8 (eight) hours as needed (weather/sinus headaches), Disp: 90 tablet, Rfl: 2    Multiple Vitamin (MULTIVITAMIN) tablet, Take 1 tablet by mouth in the morning. Wears a patch., Disp: , Rfl:     Nutritional Supplements (Menopause Formula) TABS, Take by mouth 2 gummies daily, Disp: , Rfl:     rimegepant sulfate (Nurtec) 75 mg TBDP, Take 1 tablet (75 mg total) by mouth daily as needed (migraine), Disp: 16 tablet, Rfl: 11    tirzepatide (Zepbound) 5 mg/0.5 mL auto-injector, Inject 0.5 mL (5 mg total) under the skin once a week, Disp: 2 mL, Rfl: 2    bisacodyl (DULCOLAX) 5 mg EC tablet, Take as directed by GI office (Patient not taking: Reported on 6/26/2025), Disp: 4 tablet, Rfl: 0    polyethylene glycol (MiraLax) 17 GM/SCOOP powder, Take 238 g by mouth once for 1 dose Take 238 g my mouth. Use as directed (Patient not taking: Reported on 6/26/2025), Disp: 238 g,  Rfl: 0

## 2025-06-26 NOTE — PROGRESS NOTES
Date of surgery: 2/5/2020  Procedure: Stretta  Performing surgeon: Dr. Sha Gonzalez    Initial Weight - 133.0 lbs.  Current Weight - 136.5 lbs.  Keron Weight - 130.0 lbs.  Total Body Weight Loss (EWL) - (-3.5) lbs.  EWL% - 43%  TWB% - (-3%)    For *NEW/TRANSFER* patients only: Who referred the patient? Please provide name and specialty (PCP, GI, etc.).

## 2025-07-03 LAB — SCAN RESULT: NORMAL

## 2025-07-10 RX ORDER — SODIUM CHLORIDE, SODIUM LACTATE, POTASSIUM CHLORIDE, CALCIUM CHLORIDE 600; 310; 30; 20 MG/100ML; MG/100ML; MG/100ML; MG/100ML
125 INJECTION, SOLUTION INTRAVENOUS CONTINUOUS
Status: CANCELLED | OUTPATIENT
Start: 2025-07-10

## 2025-07-10 RX ORDER — SODIUM CHLORIDE, SODIUM LACTATE, POTASSIUM CHLORIDE, CALCIUM CHLORIDE 600; 310; 30; 20 MG/100ML; MG/100ML; MG/100ML; MG/100ML
20 INJECTION, SOLUTION INTRAVENOUS CONTINUOUS
Status: CANCELLED | OUTPATIENT
Start: 2025-07-10

## 2025-07-14 ENCOUNTER — ANESTHESIA EVENT (OUTPATIENT)
Dept: GASTROENTEROLOGY | Facility: HOSPITAL | Age: 50
End: 2025-07-14
Payer: MEDICARE

## 2025-07-14 ENCOUNTER — ANESTHESIA (OUTPATIENT)
Dept: GASTROENTEROLOGY | Facility: HOSPITAL | Age: 50
End: 2025-07-14
Payer: MEDICARE

## 2025-07-14 ENCOUNTER — HOSPITAL ENCOUNTER (OUTPATIENT)
Dept: GASTROENTEROLOGY | Facility: HOSPITAL | Age: 50
Setting detail: OUTPATIENT SURGERY
Discharge: HOME | End: 2025-07-14
Attending: NURSE PRACTITIONER
Payer: MEDICARE

## 2025-07-14 VITALS
BODY MASS INDEX: 23.39 KG/M2 | TEMPERATURE: 97.5 F | SYSTOLIC BLOOD PRESSURE: 101 MMHG | HEART RATE: 59 BPM | HEIGHT: 63 IN | OXYGEN SATURATION: 100 % | DIASTOLIC BLOOD PRESSURE: 60 MMHG | WEIGHT: 132 LBS | RESPIRATION RATE: 16 BRPM

## 2025-07-14 DIAGNOSIS — K21.9 GASTROESOPHAGEAL REFLUX DISEASE WITHOUT ESOPHAGITIS: ICD-10-CM

## 2025-07-14 DIAGNOSIS — Z12.11 SCREENING FOR COLON CANCER: ICD-10-CM

## 2025-07-14 PROCEDURE — 88305 TISSUE EXAM BY PATHOLOGIST: CPT | Performed by: PATHOLOGY

## 2025-07-14 PROCEDURE — 43235 EGD DIAGNOSTIC BRUSH WASH: CPT | Performed by: INTERNAL MEDICINE

## 2025-07-14 PROCEDURE — 45385 COLONOSCOPY W/LESION REMOVAL: CPT | Performed by: INTERNAL MEDICINE

## 2025-07-14 RX ORDER — ONDANSETRON 2 MG/ML
4 INJECTION INTRAMUSCULAR; INTRAVENOUS ONCE
Status: COMPLETED | OUTPATIENT
Start: 2025-07-14 | End: 2025-07-14

## 2025-07-14 RX ORDER — SODIUM CHLORIDE, SODIUM LACTATE, POTASSIUM CHLORIDE, CALCIUM CHLORIDE 600; 310; 30; 20 MG/100ML; MG/100ML; MG/100ML; MG/100ML
125 INJECTION, SOLUTION INTRAVENOUS CONTINUOUS
Status: DISCONTINUED | OUTPATIENT
Start: 2025-07-14 | End: 2025-07-18 | Stop reason: HOSPADM

## 2025-07-14 RX ORDER — PROPOFOL 10 MG/ML
INJECTION, EMULSION INTRAVENOUS AS NEEDED
Status: DISCONTINUED | OUTPATIENT
Start: 2025-07-14 | End: 2025-07-14

## 2025-07-14 RX ORDER — SODIUM CHLORIDE, SODIUM LACTATE, POTASSIUM CHLORIDE, CALCIUM CHLORIDE 600; 310; 30; 20 MG/100ML; MG/100ML; MG/100ML; MG/100ML
20 INJECTION, SOLUTION INTRAVENOUS CONTINUOUS
Status: DISCONTINUED | OUTPATIENT
Start: 2025-07-14 | End: 2025-07-18 | Stop reason: HOSPADM

## 2025-07-14 RX ORDER — LIDOCAINE HYDROCHLORIDE 20 MG/ML
INJECTION, SOLUTION EPIDURAL; INFILTRATION; INTRACAUDAL; PERINEURAL AS NEEDED
Status: DISCONTINUED | OUTPATIENT
Start: 2025-07-14 | End: 2025-07-14

## 2025-07-14 RX ORDER — PROPOFOL 10 MG/ML
INJECTION, EMULSION INTRAVENOUS CONTINUOUS PRN
Status: DISCONTINUED | OUTPATIENT
Start: 2025-07-14 | End: 2025-07-14

## 2025-07-14 RX ADMIN — ONDANSETRON 4 MG: 2 INJECTION INTRAMUSCULAR; INTRAVENOUS at 10:42

## 2025-07-14 RX ADMIN — LIDOCAINE HYDROCHLORIDE 100 MG: 20 INJECTION, SOLUTION EPIDURAL; INFILTRATION; INTRACAUDAL at 11:14

## 2025-07-14 RX ADMIN — PROPOFOL 140 MCG/KG/MIN: 10 INJECTION, EMULSION INTRAVENOUS at 11:14

## 2025-07-14 RX ADMIN — SODIUM CHLORIDE, SODIUM LACTATE, POTASSIUM CHLORIDE, AND CALCIUM CHLORIDE 125 ML/HR: .6; .31; .03; .02 INJECTION, SOLUTION INTRAVENOUS at 10:42

## 2025-07-14 RX ADMIN — PROPOFOL 120 MG: 10 INJECTION, EMULSION INTRAVENOUS at 11:14

## 2025-07-14 RX ADMIN — PROPOFOL 30 MG: 10 INJECTION, EMULSION INTRAVENOUS at 11:45

## 2025-07-14 RX ADMIN — PROPOFOL 50 MG: 10 INJECTION, EMULSION INTRAVENOUS at 11:24

## 2025-07-14 RX ADMIN — SODIUM CHLORIDE, SODIUM LACTATE, POTASSIUM CHLORIDE, AND CALCIUM CHLORIDE: .6; .31; .03; .02 INJECTION, SOLUTION INTRAVENOUS at 11:34

## 2025-07-14 NOTE — H&P
History and Physical - [facility]  Shweta Nolan 49 y.o. female MRN: [id number]    HPI: Shweta Nolan is a 49 y.o. year old female who presents with h/o GERD and sleeve and now for colon cancer screening.       Review of Systems    Historical Information   Past Medical History[1]  Past Surgical History[2]  Social History   Social History     Substance and Sexual Activity   Alcohol Use Never     Social History     Substance and Sexual Activity   Drug Use Not Currently    Frequency: 7.0 times per week    Types: Marijuana    Comment: medical marijuana, vaping & topical Last used 11/10/23     Tobacco Use History[3]  Family History[4]    Meds/Allergies     Not in a hospital admission.    Allergies[5]    Objective     LMP  (LMP Unknown)       PHYSICAL EXAM    Gen: NAD  CV: RRR  CHEST: Clear  ABD: soft, NT/ND  EXT: no edema  Neuro: AAO      ASSESSMENT/PLAN:  This is a 49 y.o. year old female here for GERD/ sleeve and colonoscopy for colon cancer screening.     PLAN:   Procedure: EGD/colonoscopy            [1]   Past Medical History:  Diagnosis Date    Abdominal pain     Acute cystitis with hematuria 01/03/2020    Brain condition     Pseudotumor Cerebri     Chronic kidney disease     COVID-19 virus infection 11/10/2022    De Quervain's disease (tenosynovitis)     Esophageal perforation     Gastric leak     GERD (gastroesophageal reflux disease)     Headache     Idiopathic intracranial hypertension     Kidney stone     Moderate protein-calorie malnutrition (HCC) 02/21/2018    No blood products     per pt: personal and Jain beliefs. surgeon office aware 12/13/19    Papilledema, both eyes     PONV (postoperative nausea and vomiting)     Postgastrectomy malabsorption     Presence of lumboperitoneal shunt     Resolved: Sep 20, 2017    Rotator cuff tendinitis     Resolved: Aug 23, 2017    Visual field defect    [2]   Past Surgical History:  Procedure Laterality Date    BONE EXOSTOSIS EXCISION Right  "11/16/2023    Procedure: EXCISION EXOSTOSIS 5TH TOE RIght;  Surgeon: Sri Herring DPM;  [facility];  Service: Podiatry    BONE EXOSTOSIS EXCISION Right 2/29/2024    Procedure: EXCISION EXOSTOSIS 5TH TOE;  Surgeon: Sri Herring DPM;  [facility];  Service: Podiatry    BREAST BIOPSY Left 1993    benign    CSF SHUNT      LP shunt - 2015 -  shunt - 2017    ESOPHAGOGASTRODUODENOSCOPY N/A 02/23/2018    Procedure: ESOPHAGOGASTRODUODENOSCOPY (EGD) WITH ESOPHAGEAL STENT PLACEMENT;  Surgeon: Sergio Chung MD;  [facility];  Service: Thoracic    ESOPHAGOGASTRODUODENOSCOPY N/A 02/23/2018    Procedure: ESOPHAGOGASTRODUODENOSCOPY (EGD) WITH REMOVAL ESOPHAGEAL STENT  AND REPLACEMENT WITH 23mm X155mm WALLFLEX ESOPHAGEAL STENT;  Surgeon: Sergio Chung MD;  [facility];  Service: Thoracic    ESOPHAGOGASTRODUODENOSCOPY N/A 03/28/2018    Procedure: ESOPHAGOGASTRODUODENOSCOPY (EGD) with PEJ placement.;  Surgeon: Andrea Gonzales MD;  [facility];  Service: Gastroenterology    ESOPHAGOGASTRODUODENOSCOPY N/A 04/05/2018    Procedure: ESOPHAGOGASTRODUODENOSCOPY (EGD) with botox injection and kaofed placement;  Surgeon: Naomie Leroy DO;  [facility];  Service: Gastroenterology    ESOPHAGOGASTRODUODENOSCOPY N/A 04/10/2018    Procedure: ESOPHAGOGASTRODUODENOSCOPY (EGD) with Kaofed placement;  Surgeon: Saroj Kirkland MD;  [facility];  Service: Gastroenterology    ESOPHAGOSCOPY WITH STENT INSERTION N/A 01/24/2018    Procedure: INSERTION STENT ESOPHAGEAL;  Surgeon: Gonsalo Yang MD;  [facility];  Service: Gastroenterology    EYE SURGERY Bilateral 1980    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)     FL LUMBAR PUNCTURE DIAGNOSTIC  4/10/2017    FL RETROGRADE PYELOGRAM  06/24/2020    FL RETROGRADE PYELOGRAM  08/21/2021    FL RETROGRADE PYELOGRAM  5/4/2023    FL RETROGRADE PYELOGRAM  5/18/2023    GASTRIC BYPASS  12/19/2016    Lap sleeve gastrectomy w/shunt " length shortening procedure    HIATAL HERNIA REPAIR      HYSTERECTOMY  03/14/2013    IR LUMBAR PUNCTURE  08/29/2018    LAPAROTOMY N/A 01/25/2018    Procedure: Exploratory Laparotomy, wash out,placement of drains, placement of NG feeding tube ;  Surgeon: Ruperto Jasso DO;  [facility];  Service: General    LUMBAR PERITONEAL SHUNT  11/19/2015    Laparoscopic assisted    NE BREAST REDUCTION Bilateral 03/25/2022    Procedure: BILATERAL BREAST REDUCTION;  Surgeon: Indio Williamson MD;  [facility];  Service: Plastics    NE CRTJ SHUNT [id number] Right 12/18/2019    Procedure: IMAGE GUIDED INSERTION OF RIGHT CORONAL VENTRICULAR-ATRIAL SHUNT;  Surgeon: Edouard Pires MD;  [facility];  Service: Neurosurgery    NE CRTJ SHUNT [id number] TERMINUS Right 05/31/2017    Procedure: IMAGE GUIDED CORONAL PLACEMENT OF PROGRAMABLE VENTRICULAR-PERITONEAL SHUNT, REMOVAL OF LP SHUNT ;  Surgeon: Edouard Pires MD;  [facility];  Service: Neurosurgery    NE CYSTO/URETERO W/LITHOTRIPSY &INDWELL STENT INSRT Bilateral 06/24/2020    Procedure: CYSTOSCOPY URETEROSCOPY WITH LITHOTRIPSY HOLMIUM LASER, RETROGRADE PYELOGRAM AND exchange bilateral  STENTs URETERAL;  Surgeon: Mark Godfrey MD;  [facility];  Service: Urology    NE CYSTO/URETERO W/LITHOTRIPSY &INDWELL STENT INSRT Left 08/21/2021    Procedure: CYSTOSCOPY; LEFT URETEROSCOPY WITH RETROGRADE PYELOGRAM, REMOVAL OF STONE AND INSERTION LEFT URETERAL STENT;  Surgeon: Jamie Nuñez MD;  [facility];  Service: Urology    NE CYSTO/URETERO W/LITHOTRIPSY &INDWELL STENT INSRT N/A 5/18/2023    Procedure: CYSTOSCOPY URETEROSCOPY WITH LITHOTRIPSY HOLMIUM LASER, RETROGRADE PYELOGRAM, REMOVAL OF BILATERAL STENTS,  AND INSERTION STENT URETERAL LEFT ,BASKET EXTRACTION OF STONE LEFT SIDE;  Surgeon: Jamie Nuñez MD;  [facility];  Service: Urology    NE CYSTOURETHROSCOPY W/URETERAL  CATHETERIZATION N/A 2020    Procedure: Bilateral CYSTOSCOPY RETROGRADE PYELOGRAM WITH INSERTION STENT URETERAL;  Surgeon: Moe Mcgrath MD;  Location:  [facility];  Service: Urology    KS CYSTOURETHROSCOPY W/URETERAL CATHETERIZATION Bilateral 2023    Procedure: CYSTOSCOPY RETROGRADE PYELOGRAM WITH INSERTION STENT URETERAL;  Surgeon: Moe Mcgrath MD;  [facility];  Service: Urology    KS EGD TRANSORAL BIOPSY SINGLE/MULTIPLE N/A 2018    Procedure: ESOPHAGOGASTRODUODENOSCOPY (EGD) with padlock clip placement;  Surgeon: Lisbet Gonzalez MD;  [facility];  Service: Bariatrics    KS RMVL COMPL CSF SHUNT SYSTEM W/O RPLCMT SHUNT Right 2018    Procedure: Removal of  shunt;  Surgeon: Edouard Pires MD;  [facility];  Service: Neurosurgery    KS RPLCMT/REVJ CSF SHUNT VALVE/CATH SHUNT SYS Right 2018    Procedure: Externalization of right-sided SHUNT VENTRICULAR-PERITONEAL in anterior chest wall ribs two and three level  ;  Surgeon: Miquel Duarte MD;  [facility];  Service: Neurosurgery    REDUCTION MAMMAPLASTY Bilateral     WRIST SURGERY Right     x3 ,    [3]   Social History  Tobacco Use   Smoking Status Former    Current packs/day: 0.00    Average packs/day: 0.5 packs/day for 20.6 years (10.3 ttl pk-yrs)    Types: Cigarettes    Start date: 1992    Quit date: 2012    Years since quittin.8   Smokeless Tobacco Never   [4]   Family History  Problem Relation Name Age of Onset    Kidney cancer Mother  66    No Known Problems Sister      No Known Problems Daughter      No Known Problems Maternal Grandmother      Colon cancer Maternal Grandfather  39    No Known Problems Paternal Grandmother      Cancer Paternal Grandfather      Thyroid cancer Brother  40    No Known Problems Maternal Aunt      No Known Problems Maternal Aunt      Lung cancer Maternal Uncle  61    No Known Problems Paternal Aunt      Cancer Family          Gastric    Leukemia  Family      Colon cancer Family      Pancreatic cancer Family     [5]   Allergies  Allergen Reactions    Benadryl [Diphenhydramine] Anaphylaxis     Throat closing    Phenergan [Promethazine] Anaphylaxis    Nsaids Other (See Comments)     Avoids due to Hx Gastric Sleeve

## 2025-07-14 NOTE — ANESTHESIA POSTPROCEDURE EVALUATION
Post-Op Assessment Note    CV Status:  Stable    Pain management: adequate       Mental Status:  Lethargic and sleepy   Hydration Status:  Euvolemic   PONV Controlled:  Controlled   Airway Patency:  Patent     Post Op Vitals Reviewed: Yes    No anethesia notable event occurred.    Staff: CRNA           Last Filed PACU Vitals:  Vitals Value Taken Time   Temp 97.5 °F (36.4 °C) 07/14/25 12:04   Pulse 71 07/14/25 12:04   /66 07/14/25 12:04   Resp 17 07/14/25 12:04   SpO2 100 % 07/14/25 12:04       Modified [place]:     Vitals Value Taken Time   Activity 0 07/14/25 12:05   Respiration 2 07/14/25 12:05   Circulation 1 07/14/25 12:05   Consciousness 0 07/14/25 12:05   Oxygen Saturation 2 07/14/25 12:05     Modified [place] Score: 5

## 2025-07-14 NOTE — ANESTHESIA PREPROCEDURE EVALUATION
Procedure:  COLONOSCOPY  EGD    Relevant Problems   ANESTHESIA   (+) PONV (postoperative nausea and vomiting)      CARDIO   (+) Hypercholesteremia   (+) Migraine with aura and without status migrainosus, not intractable   (+) Murmur, cardiac      GI/HEPATIC   (+) Gastroesophageal reflux disease      /RENAL   (+) Obstruction of left ureteropelvic junction (UPJ) due to stone   (+) Renal calculi      HEMATOLOGY   (+) Iron deficiency anemia secondary to inadequate dietary iron intake      NEURO/PSYCH   (+) Chronic tension-type headache, intractable   (+) Daily headache   (+) Migraine with aura and without status migrainosus, not intractable   (+) PTSD (post-traumatic stress disorder)        Physical Exam    Airway     Mallampati score: II  TM Distance: >3 FB        Cardiovascular  Cardiovascular exam normal    Dental   No notable dental hx     Pulmonary  Pulmonary exam normal     Neurological  - normal exam    Other Findings  post-pubertal.      Anesthesia Plan  ASA Score- 3     Anesthesia Type- IV sedation with anesthesia with ASA Monitors.         Additional Monitors:     Airway Plan:            Plan Factors-Exercise tolerance (METS): >4 METS.    Chart reviewed.        Patient is not a current smoker.      Obstructive sleep apnea risk education given perioperatively.        Induction-     Postoperative Plan- .   Monitoring Plan - Monitoring plan - standard ASA monitoring          Informed Consent- Anesthetic plan and risks discussed with patient.        NPO Status:  No vitals data found for the desired time range.

## 2025-07-21 PROCEDURE — 88305 TISSUE EXAM BY PATHOLOGIST: CPT | Performed by: PATHOLOGY

## 2025-07-29 ENCOUNTER — NURSE TRIAGE (OUTPATIENT)
Age: 50
End: 2025-07-29

## 2025-07-31 ENCOUNTER — OFFICE VISIT (OUTPATIENT)
Dept: INTERNAL MEDICINE CLINIC | Facility: OTHER | Age: 50
End: 2025-07-31
Payer: MEDICARE

## 2025-07-31 VITALS
SYSTOLIC BLOOD PRESSURE: 100 MMHG | DIASTOLIC BLOOD PRESSURE: 74 MMHG | WEIGHT: 131.8 LBS | TEMPERATURE: 98.3 F | BODY MASS INDEX: 23.35 KG/M2 | OXYGEN SATURATION: 99 % | HEART RATE: 80 BPM | HEIGHT: 63 IN

## 2025-07-31 DIAGNOSIS — L98.9 SKIN LESION: Primary | ICD-10-CM

## 2025-07-31 PROCEDURE — 99213 OFFICE O/P EST LOW 20 MIN: CPT

## 2025-08-15 ENCOUNTER — OFFICE VISIT (OUTPATIENT)
Dept: DERMATOLOGY | Facility: CLINIC | Age: 50
End: 2025-08-15
Payer: MEDICARE

## 2025-08-23 ENCOUNTER — NURSE TRIAGE (OUTPATIENT)
Dept: OTHER | Facility: OTHER | Age: 50
End: 2025-08-23

## 2025-08-23 ENCOUNTER — TELEMEDICINE (OUTPATIENT)
Dept: OTHER | Facility: HOSPITAL | Age: 50
End: 2025-08-23
Payer: MEDICARE

## 2025-08-23 DIAGNOSIS — U07.1 COVID-19: Primary | ICD-10-CM

## 2025-08-23 PROBLEM — Z00.00 ANNUAL PHYSICAL EXAM: Status: RESOLVED | Noted: 2021-12-17 | Resolved: 2025-08-23

## 2025-08-23 PROBLEM — Z98.84 BARIATRIC SURGERY STATUS: Status: RESOLVED | Noted: 2018-06-22 | Resolved: 2025-08-23

## 2025-08-23 PROBLEM — Z48.815 ENCOUNTER FOR SURGICAL AFTERCARE FOLLOWING SURGERY OF DIGESTIVE SYSTEM: Status: RESOLVED | Noted: 2020-09-24 | Resolved: 2025-08-23

## 2025-08-23 PROBLEM — Z96.89: Status: ACTIVE | Noted: 2018-01-24

## 2025-08-23 PROBLEM — N20.1 OBSTRUCTION OF LEFT URETEROPELVIC JUNCTION (UPJ) DUE TO STONE: Status: RESOLVED | Noted: 2023-05-03 | Resolved: 2025-08-23

## 2025-08-23 PROBLEM — R29.818 SUSPECTED SLEEP APNEA: Status: RESOLVED | Noted: 2024-11-20 | Resolved: 2025-08-23

## 2025-08-23 PROCEDURE — 99213 OFFICE O/P EST LOW 20 MIN: CPT | Performed by: PHYSICIAN ASSISTANT

## 2025-08-23 RX ORDER — NIRMATRELVIR AND RITONAVIR 300-100 MG
3 KIT ORAL 2 TIMES DAILY
Qty: 30 TABLET | Refills: 0 | Status: SHIPPED | OUTPATIENT
Start: 2025-08-23 | End: 2025-08-28

## (undated) DEVICE — SINGLE-USE BIOPSY FORCEPS: Brand: RADIAL JAW 4

## (undated) DEVICE — MONITORR™ ICP EXTERNAL CSF DRAINAGE AND MONITORING SYSTEM: Brand: MONITORR™

## (undated) DEVICE — NEEDLE SPINAL 25G X 3.5 IN QUINCKE

## (undated) DEVICE — SPONGE PVP SCRUB WING STERILE

## (undated) DEVICE — PREMIUM DRY TRAY LF: Brand: MEDLINE INDUSTRIES, INC.

## (undated) DEVICE — FLOSEAL MATRIX IS INDICATED IN SURGICAL PROCEDURES (OTHER THAN IN OPHTHALMIC) AS AN ADJUNCT TO HEMOSTASIS WHEN CONTROL OF BLEEDING BY LIGATURE OR CONVENTIONAL PROCEDURES IS INEFFECTIVE OR IMPRACTICAL.: Brand: FLOSEAL HEMOSTATIC MATRIX

## (undated) DEVICE — MEDI-VAC NON-CONDUCTIVE SUCTION TUBING 6MM X 1.8M (6FT.) L: Brand: CARDINAL HEALTH

## (undated) DEVICE — DRAPE C-ARM X-RAY

## (undated) DEVICE — ZIMMER® STERILE DISPOSABLE TOURNIQUET CUFF, DUAL PORT, SINGLE BLADDER, 18 IN. (46 CM)

## (undated) DEVICE — GAUZE SPONGES,16 PLY: Brand: CURITY

## (undated) DEVICE — SUT PROLENE 6-0 BV-1/BV-1 24 IN 8805H

## (undated) DEVICE — SUTURE BOOTS YELLOW

## (undated) DEVICE — GLOVE SRG BIOGEL 7.5

## (undated) DEVICE — PACK BURR HOLE PBDS RF

## (undated) DEVICE — ELECTRODE BLADE MOD  E-Z CLEAN 6.5IN -0014M

## (undated) DEVICE — NEEDLE 25G X 1 1/2

## (undated) DEVICE — CAST PADDING 4 IN SYNTHETIC STRL

## (undated) DEVICE — ADHESIVE SKIN CLOSR DERMABOND PRINEO

## (undated) DEVICE — SUT VICRYL 4-0 SH-1 27 IN J218H

## (undated) DEVICE — TRAY FOLEY 16FR URIMETER SURESTEP

## (undated) DEVICE — 10FR FRAZIER SUCTION HANDLE: Brand: CARDINAL HEALTH

## (undated) DEVICE — VESSEL LOOPS X-RAY DETECTABLE: Brand: DEROYAL

## (undated) DEVICE — LASER HOLMIUM FIBER 365 MIC

## (undated) DEVICE — TOOL 9MH30 LEGEND 9CM 3MM MH: Brand: MIDAS REX

## (undated) DEVICE — CHLORAPREP HI-LITE 26ML ORANGE

## (undated) DEVICE — PROXIMATE SKIN STAPLERS (35 WIDE) CONTAINS 35 STAINLESS STEEL STAPLES (FIXED HEAD): Brand: PROXIMATE

## (undated) DEVICE — ACE WRAP 6 IN STERILE

## (undated) DEVICE — INTENDED FOR TISSUE SEPARATION, AND OTHER PROCEDURES THAT REQUIRE A SHARP SURGICAL BLADE TO PUNCTURE OR CUT.: Brand: BARD-PARKER SAFETY BLADES SIZE 11, STERILE

## (undated) DEVICE — GLOVE SRG BIOGEL ORTHOPEDIC 7.5

## (undated) DEVICE — BASKET STONE RTRVL PLTN CL 3FR 115CM

## (undated) DEVICE — STERILE SURGICAL LUBRICANT,  TUBE: Brand: SURGILUBE

## (undated) DEVICE — PERFORATOR CRANIAL 14MM

## (undated) DEVICE — GLOVE INDICATOR PI UNDERGLOVE SZ 7.5 BLUE

## (undated) DEVICE — GLOVE INDICATOR PI UNDERGLOVE SZ 8.5 BLUE

## (undated) DEVICE — SUT MONOCRYL 4-0 PS-2 18 IN Y496G

## (undated) DEVICE — BULB SYRINGE,IRRIGATION WITH PROTECTIVE CAP: Brand: DOVER

## (undated) DEVICE — LEGGINGS: Brand: CONVERTORS

## (undated) DEVICE — PASSER 48407 CATHETER 38CM DISP.: Brand: CATHTER PASSER, DISPOSABLE

## (undated) DEVICE — DRESSING XEROFORM 5 X 9

## (undated) DEVICE — BETHLEHEM UNIVERSAL BREAST PK: Brand: CARDINAL HEALTH

## (undated) DEVICE — SUT MONOCRYL 4-0 PS-2 27 IN Y426H

## (undated) DEVICE — DRAPE SHEET THREE QUARTER

## (undated) DEVICE — FIRST STEP BEDSIDE KIT - STAND-UP POUCH, ENDOSCOPIC CLEANING PAD - 1 POUCH: Brand: FIRST STEP BEDSIDE KIT - STAND-UP POUCH, ENDOSCOPIC CLEANING PAD

## (undated) DEVICE — PROXIMATE PLUS MD MULTI-DIRECTIONAL RELEASE SKIN STAPLERS CONTAINS 35 STAINLESS STEEL STAPLES APPROXIMATE CLOSED DIMENSIONS: 6.9MM X 3.9MM WIDE: Brand: PROXIMATE

## (undated) DEVICE — 3M™ TEGADERM™ TRANSPARENT FILM DRESSING FRAME STYLE, 1624W, 2-3/8 IN X 2-3/4 IN (6 CM X 7 CM), 100/CT 4CT/CASE: Brand: 3M™ TEGADERM™

## (undated) DEVICE — INTENDED FOR TISSUE SEPARATION, AND OTHER PROCEDURES THAT REQUIRE A SHARP SURGICAL BLADE TO PUNCTURE OR CUT.: Brand: BARD-PARKER ® CARBON RIB-BACK BLADES

## (undated) DEVICE — SYRINGE 10ML LL

## (undated) DEVICE — MEDI-VAC YANKAUER SUCTION HANDLE W/STRAIGHT TIP & CONTROL VENT: Brand: CARDINAL HEALTH

## (undated) DEVICE — PACK UNIVERSAL DRAPES SUB-Q ICD

## (undated) DEVICE — DRAPE TOWEL: Brand: CONVERTORS

## (undated) DEVICE — SYRINGE 20ML LL

## (undated) DEVICE — BUTTON SWITCH PENCIL HOLSTER: Brand: VALLEYLAB

## (undated) DEVICE — ENTRIFLEX 8FR X 43 INCH WITH STYLET AND DUAL PORT: Brand: KANGAROO

## (undated) DEVICE — CURITY NON-ADHERENT STRIPS: Brand: CURITY

## (undated) DEVICE — ENDOPATH XCEL BLADELESS TROCARS WITH STABILITY SLEEVES: Brand: ENDOPATH XCEL

## (undated) DEVICE — 3M™ STERI-STRIP™ COMPOUND BENZOIN TINCTURE 40 BAGS/CARTON 4 CARTONS/CASE C1544: Brand: 3M™ STERI-STRIP™

## (undated) DEVICE — PACK CUSTOM GU CYSTO PACK RF

## (undated) DEVICE — GUIDEWIRE STRGHT TIP 0.035 IN  SOLO PLUS

## (undated) DEVICE — SURGICEL 4 X 8

## (undated) DEVICE — STERILE MAJOR GENERAL PACK: Brand: CARDINAL HEALTH

## (undated) DEVICE — ADHESIVE SKIN HIGH VISCOSITY EXOFIN 1ML

## (undated) DEVICE — DRAPE SURGIKIT SADDLE BAG

## (undated) DEVICE — GLOVE INDICATOR PI UNDERGLOVE SZ 8 BLUE

## (undated) DEVICE — PAD GROUNDING ADULT

## (undated) DEVICE — TUBING SUCTION 5MM X 12 FT

## (undated) DEVICE — PLASTIC ADHESIVE BANDAGE: Brand: CURITY

## (undated) DEVICE — GUIDEWIRE STRGHT TIP 0.038 IN SOLO PLUS

## (undated) DEVICE — SUT VICRYL PLUS 3-0 RB-1 CR/8 18 IN VCP713D

## (undated) DEVICE — PLUMEPEN PRO 10FT

## (undated) DEVICE — LIGHT HANDLE COVER CAMERA DISP

## (undated) DEVICE — RANEY SCALP CLIP STERILE: Brand: AESCULAP

## (undated) DEVICE — URETERAL DUAL LUMEN CATH

## (undated) DEVICE — GUIDEWIRE ANGLED TIP 0.035 IN SOLO PLUS

## (undated) DEVICE — SUT VICRYL 3-0 SH 27 IN J416H

## (undated) DEVICE — TIP ASSY 9731116 PACKAGED- PCI

## (undated) DEVICE — NEPTUNE E-SEP SMOKE EVACUATION PENCIL, COATED, 70MM BLADE, PUSH BUTTON SWITCH: Brand: NEPTUNE E-SEP

## (undated) DEVICE — SUT STRATAFIX SPIRAL MONOCRYL PLUS 3-0 PS-2 45CM SXMP1B107

## (undated) DEVICE — ANTIBACTERIAL VIOLET BRAIDED (POLYGLACTIN 910), SYNTHETIC ABSORBABLE SUTURE: Brand: COATED VICRYL

## (undated) DEVICE — INTENDED FOR TISSUE SEPARATION, AND OTHER PROCEDURES THAT REQUIRE A SHARP SURGICAL BLADE TO PUNCTURE OR CUT.: Brand: BARD-PARKER SAFETY BLADES SIZE 15, STERILE

## (undated) DEVICE — GLOVE INDICATOR PI UNDERGLOVE SZ 7 BLUE

## (undated) DEVICE — LIGHT HANDLE COVER SLEEVE DISP BLUE STELLAR

## (undated) DEVICE — HIGH PERFORMANCE GUIDEWIRE: Brand: JAGWIRE

## (undated) DEVICE — GLOVE SRG BIOGEL 8

## (undated) DEVICE — PACK TUR

## (undated) DEVICE — SPECIMEN CONTAINER STERILE PEEL PACK

## (undated) DEVICE — UTILITY MARKER,BLACK WITH LABELS: Brand: DEVON

## (undated) DEVICE — 2000CC GUARDIAN II: Brand: GUARDIAN

## (undated) DEVICE — INTENDED FOR TISSUE SEPARATION, AND OTHER PROCEDURES THAT REQUIRE A SHARP SURGICAL BLADE TO PUNCTURE OR CUT.: Brand: BARD-PARKER SAFETY BLADES SIZE 10, STERILE

## (undated) DEVICE — CULTURE TUBE AEROBIC

## (undated) DEVICE — CULTURE TUBE ANAEROBIC

## (undated) DEVICE — TIBURON SPLIT SHEET: Brand: CONVERTORS

## (undated) DEVICE — HEAVY DUTY TABLE COVER: Brand: CONVERTORS

## (undated) DEVICE — COVER ROLL 8 IN X 2 YD STRETCH TAPE

## (undated) DEVICE — COBAN 4 IN STERILE

## (undated) DEVICE — AIR AND WATER TUBING/CAP SET FOR OLYMPUS® SCOPES: Brand: ERBE

## (undated) DEVICE — PAD CAST 6 IN COTTON NON STERILE

## (undated) DEVICE — MARKER REFLECTIVE RADIOPAQUE SPHERE

## (undated) DEVICE — ABDOMINAL PAD: Brand: DERMACEA

## (undated) DEVICE — CATH URETERAL 5FR X 70 CM FLEX TIP POLYUR BARD

## (undated) DEVICE — INVIEW CLEAR LEGGINGS: Brand: CONVERTORS

## (undated) DEVICE — 60 ML SYRINGE,REGULAR TIP: Brand: MONOJECT

## (undated) DEVICE — SHEATH URETERAL ACCESS 10/12FR 35CM PROXIS

## (undated) DEVICE — MEDTRONIC DLP TOURNIQUET KIT

## (undated) DEVICE — DRAPE CAMERA/LASER

## (undated) DEVICE — PREP SURGICAL PURPREP 26ML

## (undated) DEVICE — 4-PORT MANIFOLD: Brand: NEPTUNE 2

## (undated) DEVICE — GLOVE SRG LF STRL BGL SKNSNS 8 PF

## (undated) DEVICE — ADHESIVE SKN CLSR HISTOACRYL FLEX 0.5ML LF

## (undated) DEVICE — SUT SILK 2-0 TIES 144 IN LA55G

## (undated) DEVICE — MICROPUNCTURE 401

## (undated) DEVICE — 3M™ IOBAN™ 2 ANTIMICROBIAL INCISE DRAPE 6651EZ: Brand: IOBAN™ 2

## (undated) DEVICE — DRAPE INTESTINAL ISOLATION BAG

## (undated) DEVICE — Device

## (undated) DEVICE — SUT SILK 2-0 18 IN A185H

## (undated) DEVICE — BETHLEHEM UNIVERSAL  MIONR EXT: Brand: CARDINAL HEALTH

## (undated) DEVICE — GLOVE SRG BIOGEL 6.5

## (undated) DEVICE — POOLE SUCTION INSTRUMENT WITH REMOVABLE SHEATH AND PREATTACHED 6' (1.8 M) CLEAR PLASTIC TUBING: Brand: POOLE

## (undated) DEVICE — FORCEP ENDO RESCUE COMBO 230CM

## (undated) DEVICE — SPECIMEN CONTAINER: Brand: CARDINAL HEALTH

## (undated) DEVICE — ASTOUND STANDARD SURGICAL GOWN, XL: Brand: CONVERTORS

## (undated) DEVICE — DISPOSABLE EQUIPMENT COVER: Brand: SMALL TOWEL DRAPE

## (undated) DEVICE — NEEDLE BLUNT 18 G X 1 1/2IN

## (undated) DEVICE — FLOSEAL HEMOSTATIC MATRIX, 5 ML: Brand: FLOSEAL

## (undated) DEVICE — BASKET SPECIMEN RETRIVAL 1.9FR 120CM

## (undated) DEVICE — FLEXIBLE ADHESIVE BANDAGE,X-LARGE: Brand: CURITY

## (undated) DEVICE — DRAPE LAPAROTOMY W/POUCHES

## (undated) DEVICE — GLOVE SRG BIOGEL ECLIPSE 7

## (undated) DEVICE — ANTIBACTERIAL UNDYED BRAIDED (POLYGLACTIN 910), SYNTHETIC ABSORBABLE SUTURE: Brand: COATED VICRYL

## (undated) DEVICE — TELFA NON-ADHERENT ABSORBENT DRESSING: Brand: TELFA

## (undated) DEVICE — ENDOSCOPIC VALVE WITH ADAPTER.: Brand: SURSEAL® II

## (undated) DEVICE — SUT SILK 0 30 IN A306H

## (undated) DEVICE — 3M™ IOBAN™ 2 ANTIMICROBIAL INCISE DRAPE 6650EZ: Brand: IOBAN™ 2

## (undated) DEVICE — CHLORAPREP HI-LITE 10.5ML ORANGE

## (undated) DEVICE — SUT MONOCRYL 3-0 PS-2 18 IN Y497G

## (undated) DEVICE — SUT ETHILON 4-0 PS-2 18 IN 1667H

## (undated) DEVICE — CURITY STRETCH BANDAGE: Brand: CURITY

## (undated) DEVICE — BOVIE LONG TIP

## (undated) DEVICE — 3M™ STERI-STRIP™ REINFORCED ADHESIVE SKIN CLOSURES, R1547, 1/2 IN X 4 IN (12 MM X 100 MM), 6 STRIPS/ENVELOPE: Brand: 3M™ STERI-STRIP™

## (undated) DEVICE — LIGACLIP MCA MULTIPLE CLIP APPLIERS, 20 LARGE CLIPS: Brand: LIGACLIP

## (undated) DEVICE — DRAPE SHEET X-LG

## (undated) DEVICE — TOWEL SURG XR DETECT GREEN STRL RFD

## (undated) DEVICE — 3M™ TEGADERM™ TRANSPARENT FILM DRESSING FRAME STYLE, 1626W, 4 IN X 4-3/4 IN (10 CM X 12 CM), 50/CT 4CT/CASE: Brand: 3M™ TEGADERM™

## (undated) DEVICE — SUT SILK 3-0 SH 30 IN K832H

## (undated) DEVICE — PACK PBDS LAP CHOLE RF

## (undated) DEVICE — BAG DRAINAGE UROCATCHER 4 SKYTRON

## (undated) DEVICE — DRAPE EQUIPMENT RF WAND

## (undated) DEVICE — SKIN MARKER DUAL TIP WITH RULER CAP, FLEXIBLE RULER AND LABELS: Brand: DEVON

## (undated) DEVICE — NEEDLE SPINAL18G X 3.5 IN QUINCKE

## (undated) DEVICE — REM POLYHESIVE ADULT PATIENT RETURN ELECTRODE: Brand: VALLEYLAB

## (undated) DEVICE — SUT VICRYL 0 UR-6 27 IN J603H

## (undated) DEVICE — ACE WRAP 4 IN STERILE

## (undated) DEVICE — MEDI-VAC YANK SUCT HNDL W/TPRD BULBOUS TIP: Brand: CARDINAL HEALTH

## (undated) DEVICE — CAUTERY TIP POLISHER: Brand: DEVON

## (undated) DEVICE — WORKING LENGTH 235CM, WORKING CHANNEL 2.8MM: Brand: RESOLUTION 360 CLIP

## (undated) DEVICE — MAYO STAND COVER: Brand: CONVERTORS

## (undated) DEVICE — SUT ETHILON 3-0 INFS-1 30 IN 669H

## (undated) DEVICE — HIGH PERFORMANCE GUIDEWIRE: Brand: DREAMWIRE

## (undated) DEVICE — Device: Brand: DEFENDO AIR/WATER/SUCTION AND BIOPSY VALVE

## (undated) DEVICE — 3000CC GUARDIAN II: Brand: GUARDIAN

## (undated) DEVICE — CAST PADDING 4 IN UNSTERILE

## (undated) DEVICE — SUT VICRYL 2-0 REEL 54 IN J286G

## (undated) DEVICE — FIBER STD QUANTA 272 MICRON

## (undated) DEVICE — SCD SEQUENTIAL COMPRESSION COMFORT SLEEVE MEDIUM KNEE LENGTH: Brand: KENDALL SCD

## (undated) DEVICE — ELECTRODE BLADE MOD E-Z CLEAN 2.5IN 6.4CM -0012M

## (undated) DEVICE — POV-IOD SOLUTION 4OZ BT

## (undated) DEVICE — PASSER 48409 60CM DISP. CATHETER

## (undated) DEVICE — TRAVELKIT CONTAINS FIRST STEP KIT (200ML EP-4 KIT) AND SOILED SCOPE BAG - 1 KIT: Brand: TRAVELKIT CONTAINS FIRST STEP KIT AND SOILED SCOPE BAG

## (undated) DEVICE — UROCATCH BAG

## (undated) DEVICE — SUT MONOCRYL PLUS 4-0 PS-2 18 IN MCP496G

## (undated) DEVICE — TOWEL SET X-RAY